# Patient Record
Sex: MALE | Race: BLACK OR AFRICAN AMERICAN | NOT HISPANIC OR LATINO | Employment: OTHER | URBAN - METROPOLITAN AREA
[De-identification: names, ages, dates, MRNs, and addresses within clinical notes are randomized per-mention and may not be internally consistent; named-entity substitution may affect disease eponyms.]

---

## 2017-02-02 ENCOUNTER — HOSPITAL ENCOUNTER (INPATIENT)
Facility: HOSPITAL | Age: 47
LOS: 19 days | Discharge: HOME | DRG: 219 | End: 2017-02-21
Attending: THORACIC SURGERY (CARDIOTHORACIC VASCULAR SURGERY) | Admitting: THORACIC SURGERY (CARDIOTHORACIC VASCULAR SURGERY)
Payer: MEDICAID

## 2017-02-02 DIAGNOSIS — I16.0 MALIGNANT HYPERTENSIVE URGENCY: ICD-10-CM

## 2017-02-02 DIAGNOSIS — E86.0 DEHYDRATION: ICD-10-CM

## 2017-02-02 DIAGNOSIS — I27.20 PULMONARY HTN: Primary | ICD-10-CM

## 2017-02-02 DIAGNOSIS — I95.81 POSTPROCEDURAL HYPOTENSION: ICD-10-CM

## 2017-02-02 DIAGNOSIS — I35.0 NONRHEUMATIC AORTIC VALVE STENOSIS: ICD-10-CM

## 2017-02-02 DIAGNOSIS — I10 UNCONTROLLED HYPERTENSION: ICD-10-CM

## 2017-02-02 DIAGNOSIS — I12.0 CKD STAGE 5 SECONDARY TO HYPERTENSION: ICD-10-CM

## 2017-02-02 DIAGNOSIS — I11.0 HYPERTENSIVE CARDIOMEGALY WITH HEART FAILURE: ICD-10-CM

## 2017-02-02 DIAGNOSIS — N18.6 ESRD (END STAGE RENAL DISEASE): ICD-10-CM

## 2017-02-02 DIAGNOSIS — I12.9 STAGE 3 CHRONIC KIDNEY DISEASE DUE TO ARTERIONEPHROSCLEROSIS: ICD-10-CM

## 2017-02-02 DIAGNOSIS — N18.5 CKD STAGE 5 SECONDARY TO HYPERTENSION: ICD-10-CM

## 2017-02-02 DIAGNOSIS — Q24.9 ADULT CONGENITAL HEART DISEASE: ICD-10-CM

## 2017-02-02 DIAGNOSIS — Z95.2 S/P AVR (AORTIC VALVE REPLACEMENT): ICD-10-CM

## 2017-02-02 DIAGNOSIS — N18.30 STAGE 3 CHRONIC KIDNEY DISEASE DUE TO ARTERIONEPHROSCLEROSIS: ICD-10-CM

## 2017-02-02 DIAGNOSIS — R73.9 HYPERGLYCEMIA: ICD-10-CM

## 2017-02-02 LAB
ALBUMIN SERPL BCP-MCNC: 2.8 G/DL
ALP SERPL-CCNC: 87 U/L
ALT SERPL W/O P-5'-P-CCNC: 41 U/L
ANION GAP SERPL CALC-SCNC: 5 MMOL/L
APTT BLDCRRT: 25.1 SEC
AST SERPL-CCNC: 26 U/L
BASOPHILS # BLD AUTO: 0.01 K/UL
BASOPHILS NFR BLD: 0.2 %
BILIRUB SERPL-MCNC: 0.5 MG/DL
BUN SERPL-MCNC: 54 MG/DL
CALCIUM SERPL-MCNC: 8.4 MG/DL
CHLORIDE SERPL-SCNC: 104 MMOL/L
CO2 SERPL-SCNC: 22 MMOL/L
CREAT SERPL-MCNC: 4.6 MG/DL
DIFFERENTIAL METHOD: ABNORMAL
EOSINOPHIL # BLD AUTO: 0.3 K/UL
EOSINOPHIL NFR BLD: 4.5 %
ERYTHROCYTE [DISTWIDTH] IN BLOOD BY AUTOMATED COUNT: 14.8 %
EST. GFR  (AFRICAN AMERICAN): 16.4 ML/MIN/1.73 M^2
EST. GFR  (NON AFRICAN AMERICAN): 14.2 ML/MIN/1.73 M^2
GLUCOSE SERPL-MCNC: 88 MG/DL
HCT VFR BLD AUTO: 26 %
HGB BLD-MCNC: 8.8 G/DL
INR PPP: 1.1
LYMPHOCYTES # BLD AUTO: 1.3 K/UL
LYMPHOCYTES NFR BLD: 20 %
MAGNESIUM SERPL-MCNC: 2.2 MG/DL
MCH RBC QN AUTO: 32.7 PG
MCHC RBC AUTO-ENTMCNC: 33.8 %
MCV RBC AUTO: 97 FL
MONOCYTES # BLD AUTO: 0.6 K/UL
MONOCYTES NFR BLD: 8.7 %
NEUTROPHILS # BLD AUTO: 4.4 K/UL
NEUTROPHILS NFR BLD: 66.3 %
PHOSPHATE SERPL-MCNC: 3.5 MG/DL
PLATELET # BLD AUTO: 194 K/UL
PMV BLD AUTO: 10.3 FL
POTASSIUM SERPL-SCNC: 4.4 MMOL/L
PROT SERPL-MCNC: 8.1 G/DL
PROTHROMBIN TIME: 11.3 SEC
RBC # BLD AUTO: 2.69 M/UL
SODIUM SERPL-SCNC: 131 MMOL/L
WBC # BLD AUTO: 6.64 K/UL

## 2017-02-02 PROCEDURE — 20600001 HC STEP DOWN PRIVATE ROOM

## 2017-02-02 PROCEDURE — 93005 ELECTROCARDIOGRAM TRACING: CPT

## 2017-02-02 PROCEDURE — 85025 COMPLETE CBC W/AUTO DIFF WBC: CPT

## 2017-02-02 PROCEDURE — 25000003 PHARM REV CODE 250: Performed by: SURGERY

## 2017-02-02 PROCEDURE — 84100 ASSAY OF PHOSPHORUS: CPT

## 2017-02-02 PROCEDURE — 80053 COMPREHEN METABOLIC PANEL: CPT

## 2017-02-02 PROCEDURE — 93010 ELECTROCARDIOGRAM REPORT: CPT | Mod: ,,, | Performed by: INTERNAL MEDICINE

## 2017-02-02 PROCEDURE — 85610 PROTHROMBIN TIME: CPT

## 2017-02-02 PROCEDURE — 83735 ASSAY OF MAGNESIUM: CPT

## 2017-02-02 PROCEDURE — 85730 THROMBOPLASTIN TIME PARTIAL: CPT

## 2017-02-02 RX ORDER — CARVEDILOL 6.25 MG/1
12.5 TABLET ORAL 2 TIMES DAILY
Status: DISCONTINUED | OUTPATIENT
Start: 2017-02-02 | End: 2017-02-03

## 2017-02-02 RX ADMIN — CARVEDILOL 12.5 MG: 6.25 TABLET, FILM COATED ORAL at 11:02

## 2017-02-02 NOTE — IP AVS SNAPSHOT
[facility]  [address]  [facility] [address]  Phone: [phone]           Patient Discharge Instructions     Our goal is to set you up for success. This packet includes information on your condition, medications, and your home care. It will help you to care for yourself so you don't get sicker and need to go back to the hospital.     Please ask your nurse if you have any questions.        There are many details to remember when preparing to leave the hospital. Here is what you will need to do:    1. Take your medicine. If you are prescribed medications, review your Medication List in the following pages. You may have new medications to  at the pharmacy and others that you'll need to stop taking. Review the instructions for how and when to take your medications. Talk with your doctor or nurses if you are unsure of what to do.     2. Go to your follow-up appointments. Specific follow-up information is listed in the following pages. Your may be contacted by a transition nurse or clinical provider about future appointments. Be sure we have all of the phone numbers to reach you, if needed. Please contact your provider's office if you are unable to make an appointment.     3. Watch for warning signs. Your doctor or nurse will give you detailed warning signs to watch for and when to call for assistance. These instructions may also include educational information about your condition. If you experience any of warning signs to your health, call your doctor.               ** Verify the list of medication(s) below is accurate and up to date. Carry this with you in case of emergency. If your medications have changed, please notify your healthcare provider.             Medication List      START taking these medications        Additional Info    Begin Date AM Noon PM Bedtime    ascorbic acid (vitamin C) 500 MG tablet   Commonly known as:  VITAMIN C   Refills:  0   Dose:  500 mg     Last time this was given:  500 mg on 2/21/2017  9:41 AM   Instructions:  Take 1 tablet (500 mg total) by mouth 2 (two) times daily.                                  aspirin 81 MG EC tablet   Commonly known as:  ECOTRIN   Refills:  0   Dose:  81 mg    Last time this was given:  81 mg on 2/21/2017  9:41 AM   Instructions:  Take 1 tablet (81 mg total) by mouth once daily.                               docusate sodium 100 MG capsule   Commonly known as:  COLACE   Refills:  0   Dose:  100 mg    Last time this was given:  100 mg on 2/21/2017  9:42 AM   Instructions:  Take 1 capsule (100 mg total) by mouth 2 (two) times daily.                                  omega-3 fatty acids-vitamin E 1,000 mg Cap   Commonly known as:  FISH OIL   Refills:  0   Dose:  1 capsule    Instructions:  Take 1 capsule by mouth once daily.                            oxycodone 5 MG immediate release tablet   Commonly known as:  ROXICODONE   Quantity:  60 tablet   Refills:  0   Dose:  5 mg    Last time this was given:  10 mg on 2/21/2017  6:38 AM   Instructions:  Take 1 tablet (5 mg total) by mouth every 4 (four) hours as needed.                            sevelamer carbonate 800 mg Tab   Commonly known as:  RENVELA   Quantity:  90 tablet   Refills:  11   Dose:  800 mg    Last time this was given:  800 mg on 2/21/2017  2:28 PM   Instructions:  Take 1 tablet (800 mg total) by mouth 3 (three) times daily with meals.                                     warfarin 7.5 MG tablet   Commonly known as:  COUMADIN   Quantity:  30 tablet   Refills:  11   Dose:  7.5 mg    Last time this was given:  7.5 mg on 2/20/2017  7:25 PM   Instructions:  Take 1 tablet (7.5 mg total) by mouth Daily.                                 CHANGE how you take these medications        Additional Info    Begin Date AM Noon PM Bedtime    cloNIDine 0.1 MG tablet   Commonly known as:  CATAPRES   Quantity:  60 tablet   Refills:  11   Dose:  0.1 mg   What changed:  when to take this     Last time this was given:  0.1 mg on 2/21/2017  9:42 AM   Instructions:  Take 1 tablet (0.1 mg total) by mouth 2 (two) times daily.                                    CONTINUE taking these medications        Additional Info    Begin Date AM Noon PM Bedtime    hydrALAZINE 100 MG tablet   Commonly known as:  APRESOLINE   Quantity:  90 tablet   Refills:  1    Last time this was given:  100 mg on 2/20/2017  6:14 AM   Instructions:  TAKE ONE TABLET BY MOUTH THREE TIMES DAILY FOR  HYPERTENSION                                     labetalol 300 MG tablet   Commonly known as:  NORMODYNE   Quantity:  60 tablet   Refills:  1   Dose:  300 mg    Last time this was given:  300 mg on 2/21/2017  2:28 PM   Instructions:  Take 1 tablet (300 mg total) by mouth every 12 (twelve) hours.                                    STOP taking these medications     amlodipine 10 MG tablet   Commonly known as:  NORVASC            Where to Get Your Medications      These medications were sent to [facility] - [address] [facility]  [address] [facility] [place] [address]     Phone:  [phone]     cloNIDine 0.1 MG tablet    sevelamer carbonate 800 mg Tab    warfarin 7.5 MG tablet         You can get these medications from any pharmacy     Bring a paper prescription for each of these medications     oxycodone 5 MG immediate release tablet       You don't need a prescription for these medications     ascorbic acid (vitamin C) 500 MG tablet    aspirin 81 MG EC tablet    docusate sodium 100 MG capsule    omega-3 fatty acids-vitamin E 1,000 mg Cap                  Please bring to all follow up appointments:    1. A copy of your discharge instructions.  2. All medicines you are currently taking in their original bottles.  3. Identification and insurance card.    Please arrive 15 minutes ahead of scheduled appointment time.    Please call 24 hours in advance if you must reschedule your appointment and/or time.        Your Scheduled  "Appointments     Mar 09, 2017  1:30 PM CST   Diagnostic Xray with [facility] [id number] 485 LB LIMIT   [facility] ([address] )    [address] [facility] [address] [phone]            Mar 09, 2017  2:15 PM CST   EKG with EKG, APPT   [facility] E[facility] ([address] )    [address]  [place] [address] [phone]            Mar 09, 2017  2:30 PM CST   Post OP with MD [facility] - Cardiovascular Surg ([address] )    [address]  [place] [address] [phone]              Follow-up Information     Follow up with Jose Gastelum MD.    Specialties:  Cardiothoracic Surgery, Transplant    Contact information:    [address] [facility] [address] [phone]            Discharge Instructions       [phone]. Appointment on Thursday, February 23, 2017 at 9:30am. Please bring Drivers license/ID and medications.     [facility]: [phone] ([address] [place] [address]) - You will start on Wednesday February 22nd.  Your appointment time is 8:15am.  Your schedule is Monday, Wednesday, and Friday.    You can call Medicaid in March or April to ask them if you can apply for full coverage Medicaid ([phone]).      Primary Diagnosis     Your primary diagnosis was:  Status Post Aortic Valve Replacement      Admission Information     Date & Time Department CSN    2/2/2017  8:20 PM [facility] [id number]      Care Providers     Provider Role Specialty Primary office phone    Leonides Kline MD Consulting Physician  Nephrology [phone]    Jose Gastelum MD Surgeon  Cardiothoracic Surgery [phone]      Your Vitals Were     BP Pulse Temp Resp Height Weight    97/61 (BP Location: Right arm, Patient Position: Lying, BP Method: Automatic) 100 99 °F (37.2 °C) (Oral) 16 6' 2" (1.88 m) 75 kg (165 lb 5.5 oz)    SpO2 BMI       "       96% 21.23 kg/m2         Recent Lab Values     No lab values to display.      Pending Labs     Order Current Status    APTT In process    APTT In process    APTT In process    Basic metabolic panel In process    Basic metabolic panel In process    Basic metabolic panel In process    CBC auto differential In process    CBC auto differential In process    Fibrinogen In process    Hematocrit In process    Hematocrit In process    Hemoglobin In process    Magnesium In process    Phosphorus In process    Potassium In process    Prepare RBC 2 Units; s/p cardiac surgery In process    Protime-INR In process    Protime-INR In process    Renal function panel In process    Prepare Fresh Frozen Plasma 6 Units Preliminary result    Prepare Platelets 1 Dose Preliminary result    Prepare RBC 2 Units; a Preliminary result    Prepare RBC 6 Units; surgery Preliminary result      Allergies as of 2/21/2017     No Known Allergies      Advance Directives     An advance directive is a document which, in the event you are no longer able to make decisions for yourself, tells your healthcare team what kind of treatment you do or do not want to receive, or who you would like to make those decisions for you.  If you do not currently have an advance directive, [facility] encourages you to create one.  For more information call:  [phone]), [phone] ([phone]),  or log on to [email or web address].        Language Assistance Services     ATTENTION: Language assistance services are available, free of charge. Please call [phone].      ATENCIÓN: Si habla español, tiene a sommer disposición servicios gratuitos de asistencia lingüística. Llame al [phone].     CHÚ Ý: N?u b?n nói Ti?ng Vi?t, có các d?ch v? h? tr? ngôn ng? mi?n phí dành cho b?n. G?i s? [phone].        Blood Transfusion Reaction Signs and Symptoms     The blood you have received has been matched for you as carefully as possible. Most  patients who receive a blood transfusion do not experience problems. However, there can be a delayed reaction that happens a few weeks after your blood transfusion. Contact your physician immediately if you experience any NEW SYMPTOMS listed below:     Fever greater than 100.4 degrees    Chills   Yellow color to your skin or eyes(Jaundice)   Back pain, chest pain, or pain at the infusion site   Weakness (more than usual)   Discomfort or uneasiness more than usual (Malaise)   Nausea or vomiting   Shortness of breath, wheezing, or coughing   Higher or lower blood pressure than normal   Skin rash, itching, skin redness, or localized swelling (example: hands or feet)   Urinating less than normal   Urine appears reddish or orange and is darker than normal      Remember that some these signs may already exist for you--such as having chronic back pain or high blood pressure. You only need to look for and report to your doctor any new occurrences since your blood transfusion that are of concern.        Heart Failure Education       Heart Failure: Being Active  You have a condition called heart failure. Being active doesnt mean that you have to wear yourself out. Even a little movement each day helps to strengthen your heart. If you cant get out to exercise, you can do simple stretching and strengthening exercises at home. These are good ways to keep you well-conditioned and prevent you and your heart from becoming excessively weak.    Ideas to get you started  · Add a little movement to things you do now. Walk to mail letters. Park your car at the far end of the parking lot and walk to the store. Walk up a flight of stairs instead of taking the elevator.  · Choose activities you enjoy. You might walk, swim, or ride an exercise bike. Things like gardening and washing the car count, too. Other possibilities include: washing dishes, walking the dog, walking around the mall, and doing aerobic activities with  friends.  · Join a group exercise program at a [facility] or [facility], a senior center, or a community center. Or look into a hospital cardiac rehabilitation program. Ask your doctor if you qualify.  Tips to keep you going  · Get up and get dressed each day. Go to a coffee shop and read a newspaper or go somewhere that you'll be in the presence of other active people. Youll feel more like being active.  · Make a plan. Choose one or more activities that you enjoy and that you can easily do. Then plan to do at least one each day. You might write your plan on a calendar.  · Go with a friend or a group if you like company. This can help you feel supported and stay motivated, too.  · Plan social events that you enjoy. This will keep you mentally engaged as well as physically motivated to do things you find pleasure in.  For your safety  · Talk with your healthcare provider before starting an exercise program.  · Exercise indoors when its too hot or too cold outside, or when the air quality is poor. Try walking at a shopping mall.  · Wear socks and sturdy shoes to maintain your balance and prevent falls.  · Start slowly. Do a few minutes several times a day at first. Increase your time and speed little by little.  · Stop and rest whenever you feel tired or get short of breath.  · Dont push yourself on days when you dont feel well.  Date Last Reviewed: 3/20/2016  © 2453-5876 [employer]. [address] [place] [address], [place] [address]. All rights reserved. This information is not intended as a substitute for professional medical care. Always follow your healthcare professional's instructions.              Heart Failure: Evaluating Your Heart  You have a condition called heart failure. To evaluate your condition, your doctor will examine you, ask questions, and do some tests. Along with looking for signs of heart failure, the doctor looks for any other health problems that may have led to heart failure. The results of  your evaluation will help your doctor form a treatment plan.  Health history and physical exam  Your visit will start with a health history. Tell the doctor about any symptoms youve noticed and about all medicines you take. Then youll have a physical exam. This includes listening to your heartbeat and breathing. Youll also be checked for swelling (edema) in your legs and neck. When you have fluid buildup or fluid in the lungs, it may be called congestive heart failure.  Diagnosing heart failure     During an echocardiogram, sound waves bounce off the heart. These are converted into a picture on the screen.   The following may be done to help your doctor form a diagnosis:  · X-rays show the size and shape of your heart. These pictures can also show fluid in your lungs.  · An electrocardiogram (ECG or EKG) shows the pattern of your heartbeat. Small pads (electrodes) are placed on your chest, arms, and legs. Wires connect the pads to the ECG machine, which records your hearts electrical signals. This can give the doctor information about heart function.  · An echocardiogram uses ultrasound waves to show the structure and movement of your heart muscle. This shows how well the heart pumps. It also shows the thickness of the heart walls, and if the heart is enlarged. It is one of the most useful, non-invasive tests as it provides information about the heart's general function. This helps your doctor make treatment decisions.  · Lab tests evaluate small amounts of blood or urine for signs of problems. A BNP lab test can help diagnose and evaluate heart failure. BNP stands for B-type natriuretic peptide. The ventricles secrete more BNP when heart failure worsens. Lab tests can also provide information about metabolic dysfunction or heart dysfunction.  Your treatment plan  Based on the results of your evaluation and tests, your doctor will develop a treatment plan. This plan is designed to relieve some of your heart  failure symptoms and help make you more comfortable. Your treatment plan may include:  · Medicine to help your heart work better and improve your quality of life  · Changes in what you eat and drink to help prevent fluid from backing up in your body  · Daily monitoring of your weight and heart failure symptoms to see how well your treatment plan is working  · Exercise to help you stay healthy  · Help with quitting smoking  · Emotional and psychological support to help adjust to the changes  · Referrals to other specialists to make sure you are being treated comprehensively  Date Last Reviewed: 3/21/2016  © 5088-7490 [employer]. [address] [place] [address]. All rights reserved. This information is not intended as a substitute for professional medical care. Always follow your healthcare professional's instructions.              Heart Failure: Making Changes to Your Diet  You have a condition called heart failure. When you have heart failure, excess fluid is more likely to build up in your body because your heart isn't working well. This makes the heart work harder to pump blood. Fluid buildup causes symptoms such as shortness of breath and swelling (edema). This is often referred to as congestive heart failure or CHF. Controlling the amount of salt (sodium) you eat may help stop fluid from building up. Your doctor may also tell you to reduce the amount of fluid you drink.  Reading food labels    Your healthcare provider will tell you how much sodium you can eat each day. Read food labels to keep track. Keep in mind that certain foods are high in salt. These include canned, frozen, and processed foods. Check the amount of sodium in each serving. Watch out for high-sodium ingredients. These include MSG (monosodium glutamate), baking soda, and sodium phosphate.   Eating less salt  Give yourself time to get used to eating less salt. It may take a little while. Here are some tips to  help:  · Take the saltshaker off the table. Replace it with salt-free herb mixes and spices.  · Eat fresh or plain frozen vegetables. These have much less salt than canned vegetables.  · Choose low-sodium snacks like sodium-free pretzels, crackers, or air-popped popcorn.  · Dont add salt to your food when youre cooking. Instead, season your foods with pepper, lemon, garlic, or onion.  · When you eat out, ask that your food be cooked without added salt.  · Avoid eating fried foods as these often have a great deal of salt.  If youre told to limit fluids  You may need to limit how much fluid you have to help prevent swelling. This includes anything that is liquid at room temperature, such as ice cream and soup. If your doctor tells you to limit fluid, try these tips:  · Measure drinks in a measuring cup before you drink them. This will help you meet daily goals.  · Chill drinks to make them more refreshing.  · Suck on frozen lemon wedges to quench thirst.  · Only drink when youre thirsty.  · Chew sugarless gum or suck on hard candy to keep your mouth moist.  · Weigh yourself daily to know if your body's fluid content is rising.  My sodium goal  Your healthcare provider may give you a sodium goal to meet each day. This includes sodium found in food as well as salt that you add. My goal is to eat no more than ___________ mg of sodium per day.     When to call your doctor  Call your doctor right away if you have any symptoms of worsening heart failure. These can include:  · Sudden weight gain  · Increased swelling of your legs or ankles  · Trouble breathing when youre resting or at night  · Increase in the number of pillows you have to sleep on  · Chest pain, pressure, discomfort, or pain in the jaw, neck, or back   Date Last Reviewed: 3/21/2016  © 6119-4123 [employer]. [address] [place] [address], [place] [address]. All rights reserved. This information is not intended as a substitute for professional  medical care. Always follow your healthcare professional's instructions.              Heart Failure: Medicines to Help Your Heart    You have a condition called heart failure (also known as congestive heart failure, or CHF). Your doctor will likely prescribe medicines for heart failure and any underlying health problems you have. Most heart failure patients take one or more types of medicinen. Your healthcare provider will work to find the combination of medicines that works best for you.  Heart failure medicines  Here are the most common heart failure medicines:  · ACE inhibitors lower blood pressure and decrease strain on the heart. This makes it easier for the heart to pump. Angiotensin receptor blockers have similar effects. These are prescribed for some patients instead of ACE inhibitors.  · Beta-blockers relieve stress on the heart. They also improve symptoms. They may also improve the heart's pumping action over time.  · Diuretics (also called water pills) help rid your body of excess water. This can help rid your body of swelling (edema). Having less fluid to pump means your heart doesnt have to work as hard. Some diuretics make your body lose a mineral called potassium. Your doctor will tell you if you need to take supplements or eat more foods high in potassium.  · Digoxin helps your heart pump with more strength. This helps your heart pump more blood with each beat. So, more oxygen-rich blood travels to the rest of the body.  · Aldosterone antagonists help alter hormones and decrease strain on the heart.  · Hydralazine and nitrates are two separate medicines used together to treat heart failure. They may come in one combination pill. They lower blood pressure and decrease how hard the heart has to pump.  Medicines for related conditions  Controlling other heart problems helps keep heart failure under control, too. Depending on other heart problems you have, medicines may be prescribed to:  · Lower  blood pressure (antihypertensives).  · Lower cholesterol levels (statins).  · Prevent blood clots (anticoagulants or aspirin).  · Keep the heartbeat steady (antiarrhythmics).  Date Last Reviewed: 3/5/2016  © 0737-6032 [employer]. [address] [place] [address]. All rights reserved. This information is not intended as a substitute for professional medical care. Always follow your healthcare professional's instructions.              Heart Failure: Procedures That May Help    The heart is a muscle that pumps oxygen-rich blood to all parts of the body. When you have heart failure, the heart is not able to pump as well as it should. Blood and fluid may back up into the lungs (congestive heart failure), and some parts of the body dont get enough oxygen-rich blood to work normally. These problems lead to the symptoms of heart failure.     Certain procedures may help the heart pump better in some cases of heart failure. Some procedures are done to treat health problems that may have caused the heart failure such as coronary artery disease or heart rhythm problems. For more serious heart failure, other options are available.  Treating artery and valve problems  If you have coronary artery disease or valve disease, procedures may be done to improve blood flow. This helps the heart pump better, which can improve heart failure symptoms. First, your doctor may do a cardiac catheterization to help detect clogged blood vessels or valve damage. During this procedure, a  thin tube (catheter) in inserted into a blood vessel and guided to the heart. There a dye is injected and a special type of X-ray (angiogram) is taken of the blood vessels. Procedures to open a blocked artery or fix damaged valves can also be done using catheterization.  · Angioplasty uses a balloon-tipped instrument at the end of the catheter. The balloon is inflated to widen the narrowed artery. In many cases, a stent is expanded to  further support the narrowed artery. A stent is a metal mesh tube.  · Valve surgery repairs or replacement of faulty valves can also be done during catheterization so blood can flow properly through the chambers of the heart.  Bypass surgery is another option to help treat blocked arteries. It uses a healthy blood vessel from elsewhere in the body. The healthy blood vessel is attached above and below the blocked area so that blood can flow around the blocked artery.  Treating heart rhythm problems  A device may be placed in the chest to help a weak heart maintain a healthy, heartbeat so the heart can pump more effectively:  · Pacemaker. A pacemaker is an implanted device that regulates your heartbeat electronically. It monitors your heart's rhythm and generates a painless electric impulse that helps the heart beat in a regular rhythm. A pacemaker is programmed to meet your specific heart rhythm needs.  · Biventricular pacing/cardiac resynchronization therapy. A type of pacemaker that paces both pumping chambers of the heart at the same time to coordinate contractions and to improve the heart's function. Some people with heart failure are candidates for this therapy.  · Implantable cardioverter defibrillator. A device similar to a pacemaker that senses when the heart is beating too fast and delivers an electrical shock to convert the fast rhythm to a normal rhythm. This can be a life saving device.  In severe cases  In more serious cases of heart failure when other treatments no longer work, other options may include:  · Ventricular assist devices (VADs). These are mechanical devices used to take over the pumping function for one or both of the heart's ventricles, or pumping chambers. A VAD may be necessary when heart failure progresses to the point that medicines and other treatments no longer help. In some cases, a VAD may be used as a bridge to transplant.  · Heart transplant. This is replacing the diseased heart  with a healthy one from a donor. This is an option for a few people who are very sick. A heart transplant is very serious and not an option for all patients. Your doctor can tell you more.  Date Last Reviewed: 3/20/2016  © 3877-4719 [employer]. [address] [place] [address]. All rights reserved. This information is not intended as a substitute for professional medical care. Always follow your healthcare professional's instructions.              Heart Failure: Tracking Your Weight  You have a condition called heart failure. When you have heart failure, a sudden weight gain or a steady rise in weight is a warning sign that your body is retaining too much water and salt. This could mean your heart failure is getting worse. If left untreated, it can cause problems for your lungs and result in shortness of breath. Weighing yourself each day is the best way to know if youre retaining water. If your weight goes up quickly, call your doctor. You will be given instructions on how to get rid of the excess water. You will likely need medicines and to avoid salt. This will help your heart work better.  Call your doctor if you gain more than 2 pounds in 1 day, more than 5 pounds in 1 week, or whatever weight gain you were told to report by your doctor. This is often a sign of worsening heart failure and needs to be evaluated and treated. Your doctor will tell you what to do next.   Tips for weighing yourself    · Weigh yourself at the same time each morning, wearing the same clothes. Weigh yourself after urinating and before eating.  · Use the same scale each day. Make sure the numbers are easy to read. Put the scale on a flat, hard surface -- not on a rug or carpet.  · Do not stop weighing yourself. If you forget one day, weigh again the next morning.  How to use your weight chart  · Keep your weight chart near the scale. Write your weight on the chart as soon as you get off the scale.  · Fill in the  month and the start date on the chart. Then write down your weight each day. Your chart will look like this:    · If you miss a day, leave the space blank. Weigh yourself the next day and write your weight in the next space.  · Take your weight chart with you when you go to see your doctor.  Date Last Reviewed: 3/20/2016  © 1253-8911 [employer]. [address] [place] [address], [place] [address]. All rights reserved. This information is not intended as a substitute for professional medical care. Always follow your healthcare professional's instructions.              Heart Failure: Warning Signs of a Flare-Up  You have a condition called heart failure. Once you have heart failure, flare-ups can happen. Below are signs that can mean your heart failure is getting worse. If you notice any of these warning signs, call your healthcare provider.  Swelling    · Your feet, ankles, or lower legs get puffier.  · You notice skin changes on your lower legs.  · Your shoes feel too tight.  · Your clothes are tighter in the waist.  · You have trouble getting rings on or off your fingers.  Shortness of breath  · You have to breathe harder even when youre doing your normal activities or when youre resting.  · You are short of breath walking up stairs or even short distances.  · You wake up at night short of breath or coughing.  · You need to use more pillows or sit up to sleep.  · You wake up tired or restless.  Other warning signs  · You feel weaker, dizzy, or more tired.  · You have chest pain or changes in your heartbeat.  · You have a cough that wont go away.  · You cant remember things or dont feel like eating.  Tracking your weight  Gaining weight is often the first warning sign that heart failure is getting worse. Gaining even a few pounds can be a sign that your body is retaining excess water and salt. Weighing yourself each day in the morning after you urinate and before you eat, is the best way to know if you're  retaining water. Get a scale that is easy to read and make sure you wear the same clothes and use the same scale every time you weigh. Your healthcare provider will show you how to track your weight. Call your doctor if you gain more than 2 pounds in 1 day, 5 pounds in 1 week, or whatever weight gain you were told to report by your doctor. This is often a sign of worsening heart failure and needs to be evaluated and treated before it compromises your breathing. Your doctor will tell you what to do next.    Date Last Reviewed: 3/15/2016  © 1711-0289 [employer]. [address] [place] [address], [place] [address]. All rights reserved. This information is not intended as a substitute for professional medical care. Always follow your healthcare professional's instructions.              Coumadin Discharge Instructions                         Chronic [facility] Disease Education             [facility] Sign-Up     Activating your [facility] account is as easy as 1-2-3!     1) Visit [email or web address], select Sign Up Now, enter this activation code and your date of birth, then select Next.  [email or web address]  Expires: 4/3/2017  9:46 AM      2) Create a username and password to use when you visit [facility] in the future and select a security question in case you lose your password and select Next.    3) Enter your e-mail address and click Sign Up!    Additional Information  If you have questions, please e-mail [email or web address] or call [phone] to talk to our [facility] staff. Remember, [facility] is NOT to be used for urgent needs. For medical emergencies, dial 911.          [facility] complies with applicable Federal civil rights laws and does not discriminate on the basis of race, color, national origin, age, disability, or sex.

## 2017-02-03 ENCOUNTER — APPOINTMENT (OUTPATIENT)
Dept: CARDIOLOGY | Facility: CLINIC | Age: 47
End: 2017-02-03
Payer: MEDICAID

## 2017-02-03 DIAGNOSIS — I35.0 SEVERE AORTIC STENOSIS: Primary | ICD-10-CM

## 2017-02-03 LAB
ANION GAP SERPL CALC-SCNC: 4 MMOL/L
BASOPHILS # BLD AUTO: 0.01 K/UL
BASOPHILS NFR BLD: 0.2 %
BUN SERPL-MCNC: 58 MG/DL
CALCIUM SERPL-MCNC: 8.2 MG/DL
CHLORIDE SERPL-SCNC: 105 MMOL/L
CO2 SERPL-SCNC: 24 MMOL/L
CREAT SERPL-MCNC: 4.8 MG/DL
DIFFERENTIAL METHOD: ABNORMAL
EOSINOPHIL # BLD AUTO: 0.3 K/UL
EOSINOPHIL NFR BLD: 4.7 %
ERYTHROCYTE [DISTWIDTH] IN BLOOD BY AUTOMATED COUNT: 14.8 %
EST. GFR  (AFRICAN AMERICAN): 15.6 ML/MIN/1.73 M^2
EST. GFR  (NON AFRICAN AMERICAN): 13.5 ML/MIN/1.73 M^2
GLUCOSE SERPL-MCNC: 80 MG/DL
HCT VFR BLD AUTO: 25.3 %
HGB BLD-MCNC: 8.5 G/DL
INTERNAL CAROTID STENOSIS: NORMAL
LYMPHOCYTES # BLD AUTO: 0.8 K/UL
LYMPHOCYTES NFR BLD: 13.6 %
MAGNESIUM SERPL-MCNC: 2.1 MG/DL
MCH RBC QN AUTO: 32.3 PG
MCHC RBC AUTO-ENTMCNC: 33.6 %
MCV RBC AUTO: 96 FL
MONOCYTES # BLD AUTO: 1.2 K/UL
MONOCYTES NFR BLD: 20.3 %
NEUTROPHILS # BLD AUTO: 3.7 K/UL
NEUTROPHILS NFR BLD: 61 %
PHOSPHATE SERPL-MCNC: 4 MG/DL
PLATELET # BLD AUTO: 189 K/UL
PMV BLD AUTO: 10 FL
POTASSIUM SERPL-SCNC: 4.3 MMOL/L
RBC # BLD AUTO: 2.63 M/UL
SODIUM SERPL-SCNC: 133 MMOL/L
WBC # BLD AUTO: 5.97 K/UL

## 2017-02-03 PROCEDURE — 93880 EXTRACRANIAL BILAT STUDY: CPT | Mod: 26,,, | Performed by: INTERNAL MEDICINE

## 2017-02-03 PROCEDURE — 25000003 PHARM REV CODE 250: Performed by: INTERNAL MEDICINE

## 2017-02-03 PROCEDURE — 80048 BASIC METABOLIC PNL TOTAL CA: CPT

## 2017-02-03 PROCEDURE — 85025 COMPLETE CBC W/AUTO DIFF WBC: CPT

## 2017-02-03 PROCEDURE — 80100016 HC MAINTENANCE HEMODIALYSIS

## 2017-02-03 PROCEDURE — 84100 ASSAY OF PHOSPHORUS: CPT

## 2017-02-03 PROCEDURE — 93880 EXTRACRANIAL BILAT STUDY: CPT

## 2017-02-03 PROCEDURE — 25000003 PHARM REV CODE 250: Performed by: NURSE PRACTITIONER

## 2017-02-03 PROCEDURE — 83735 ASSAY OF MAGNESIUM: CPT

## 2017-02-03 PROCEDURE — 20600001 HC STEP DOWN PRIVATE ROOM

## 2017-02-03 PROCEDURE — 90935 HEMODIALYSIS ONE EVALUATION: CPT | Mod: ,,, | Performed by: INTERNAL MEDICINE

## 2017-02-03 RX ORDER — CLONIDINE HYDROCHLORIDE 0.1 MG/1
0.1 TABLET ORAL NIGHTLY
Status: DISCONTINUED | OUTPATIENT
Start: 2017-02-03 | End: 2017-02-08

## 2017-02-03 RX ORDER — HEPARIN SODIUM 1000 [USP'U]/ML
1000 INJECTION, SOLUTION INTRAVENOUS; SUBCUTANEOUS
Status: DISCONTINUED | OUTPATIENT
Start: 2017-02-03 | End: 2017-02-21

## 2017-02-03 RX ORDER — CARVEDILOL 12.5 MG/1
25 TABLET ORAL 2 TIMES DAILY
Status: DISCONTINUED | OUTPATIENT
Start: 2017-02-03 | End: 2017-02-08

## 2017-02-03 RX ORDER — HYDRALAZINE HYDROCHLORIDE 50 MG/1
100 TABLET, FILM COATED ORAL EVERY 8 HOURS
Status: DISCONTINUED | OUTPATIENT
Start: 2017-02-03 | End: 2017-02-08

## 2017-02-03 RX ORDER — ASPIRIN 81 MG/1
81 TABLET ORAL DAILY
Status: DISCONTINUED | OUTPATIENT
Start: 2017-02-03 | End: 2017-02-08

## 2017-02-03 RX ORDER — AMLODIPINE BESYLATE 10 MG/1
10 TABLET ORAL DAILY
Status: DISCONTINUED | OUTPATIENT
Start: 2017-02-03 | End: 2017-02-08

## 2017-02-03 RX ORDER — ACETAMINOPHEN 325 MG/1
650 TABLET ORAL EVERY 6 HOURS PRN
Status: DISCONTINUED | OUTPATIENT
Start: 2017-02-03 | End: 2017-02-08

## 2017-02-03 RX ORDER — BISACODYL 5 MG
5 TABLET, DELAYED RELEASE (ENTERIC COATED) ORAL DAILY PRN
Status: DISCONTINUED | OUTPATIENT
Start: 2017-02-03 | End: 2017-02-08

## 2017-02-03 RX ORDER — SODIUM CHLORIDE 9 MG/ML
INJECTION, SOLUTION INTRAVENOUS
Status: DISCONTINUED | OUTPATIENT
Start: 2017-02-03 | End: 2017-02-05

## 2017-02-03 RX ORDER — SODIUM CHLORIDE 9 MG/ML
INJECTION, SOLUTION INTRAVENOUS ONCE
Status: COMPLETED | OUTPATIENT
Start: 2017-02-03 | End: 2017-02-03

## 2017-02-03 RX ADMIN — SODIUM CHLORIDE 200 ML: 0.9 INJECTION, SOLUTION INTRAVENOUS at 06:02

## 2017-02-03 RX ADMIN — CARVEDILOL 25 MG: 25 TABLET, FILM COATED ORAL at 08:02

## 2017-02-03 RX ADMIN — HYDRALAZINE HYDROCHLORIDE 100 MG: 50 TABLET ORAL at 01:02

## 2017-02-03 RX ADMIN — HYDRALAZINE HYDROCHLORIDE 100 MG: 50 TABLET ORAL at 08:02

## 2017-02-03 RX ADMIN — CLONIDINE HYDROCHLORIDE 0.1 MG: 0.1 TABLET ORAL at 08:02

## 2017-02-03 RX ADMIN — HEPARIN SODIUM 1000 UNITS: 1000 INJECTION, SOLUTION INTRAVENOUS; SUBCUTANEOUS at 06:02

## 2017-02-03 RX ADMIN — CARVEDILOL 25 MG: 25 TABLET, FILM COATED ORAL at 09:02

## 2017-02-03 RX ADMIN — ASPIRIN 81 MG: 81 TABLET, COATED ORAL at 12:02

## 2017-02-03 RX ADMIN — AMLODIPINE BESYLATE 10 MG: 10 TABLET ORAL at 09:02

## 2017-02-03 NOTE — CONSULTS
Consult Note  Nephrology    Consult Requested By: Jose Gastelum MD     Reason for Consult: TERI on CKD 5. Baseline creatinine >5.     SUBJECTIVE:     History of Present Illness:    47 y.o. AAM with h/o ASD repair at 7 yo, aortic stenosis with CHF wirh rEF, pulmonary HTN, active hep B transferred from [facility] for surgery eval for AVR. Recently admitted to OSH for SVT, HTN, CHF exacerbation 1/23 and discharged on coreg, clonidine, verapamil, readmitted to OSH 1/25 with chest pain and found to be in heart block which was thought to be medication induced. Pt came out of SVT, out of ICU. He had worsening of his CKD and had a tunneled RIJ line placed for HD on 1/26. Started HD on Friday 1/27. He has had a total of 3 dialysis sessions and gets HD MWF. His creatinine was 3.1 in 2014 and per transfer notes baseline creatinine is >5 consistent with CKD 5 now ESRD.  He got a C on 1/31 with contrast         Past Medical History   Diagnosis Date    CKD (chronic kidney disease) stage 3, GFR 30-59 ml/min     Hypertension     Tachycardia      Past Surgical History   Procedure Laterality Date    Cardiac surgery       History reviewed. No pertinent family history.  Social History   Substance Use Topics    Smoking status: Never Smoker    Smokeless tobacco: None    Alcohol use Yes      Comment: weekends only        Review of patient's allergies indicates:  No Known Allergies     Review of Systems:  Constitutional: no fever or chills  Respiratory: +sob  Cardiovascular: no chest pain or palpitations  Gastrointestinal: no nausea or vomiting, no abdominal pain or change in bowel habits  Musculoskeletal: no arthralgias or myalgias  Neurological: no seizures or tremors    OBJECTIVE:     Vital Signs (Most Recent)  Temp: 98.4 °F (36.9 °C) (02/03/17 1130)  Pulse: 81 (02/03/17 1300)  Resp: 16 (02/03/17 1130)  BP: 126/67 (02/03/17 1130)  SpO2: 99 % (02/03/17 1130)    Vital Signs Range (Last 24H):  Temp:  [98.2 °F (36.8  °C)-98.8 °F (37.1 °C)]   Pulse:  [77-97]   Resp:  [16-20]   BP: (126-184)/()   SpO2:  [97 %-99 %]     Physical Exam:  General: well developed, well nourished, chronic illness+  Lungs:  clear to auscultation bilaterally and normal respiratory effort  Cardiovascular: Heart: regular rate and rhythm and +murmur. Chest Wall: no tenderness. Extremities: no cyanosis or edema, or clubbing. Pulses: 2+ and symmetric.  Abdomen/Rectal: Abdomen: soft, non-tender non-distented; bowel sounds normal; no masses,  no organomegaly. Rectal: not examined  Musculoskeletal:no clubbing, cyanosis and has right IJ perm cath+  Neurologic: Mental status: Alert, oriented, thought content appropriate    Laboratory:  CBC:   Recent Labs  Lab 02/03/17  0433   WBC 5.97   RBC 2.63*   HGB 8.5*   HCT 25.3*      MCV 96   MCH 32.3*   MCHC 33.6     CMP:   Recent Labs  Lab 02/02/17  2101 02/03/17  0433   GLU 88 80   CALCIUM 8.4* 8.2*   ALBUMIN 2.8*  --    PROT 8.1  --    * 133*   K 4.4 4.3   CO2 22* 24    105   BUN 54* 58*   CREATININE 4.6* 4.8*   ALKPHOS 87  --    ALT 41  --    AST 26  --    BILITOT 0.5  --      Diagnostic Results:  Labs: Reviewed  X-Ray: Reviewed    ASSESSMENT/PLAN:     47 y.o. AAM with h/o ASD repair at 9 yo, aortic stenosis with CHF wirh rEF, pulmonary HTN, active hep B transferred from [facility] for surgery eval for AVR. Recently admitted to OSH for SVT, HTN, CHF exacerbation 1/23 and discharged on coreg, clonidine, verapamil, readmitted to OSH 1/25 with chest pain and found to be in heart block which was thought to be medication induced. Pt came out of SVT, out of ICU. He had worsening of his CKD and had a tunneled RIJ line placed for HD on 1/26. Started HD on Friday 1/27. He has had a total of 3 dialysis sessions and gets HD MWF. His creatinine was 3.1 in 2014 and per transfer notes baseline creatinine is >5 consistent with CKD 5 now ESRD.  He got a [facility] on 1/31 with contrast       CKD 5--->ESRD on  IHD MWF (on HD x 1 week)  Residual renal function- yes     Will provide dialysis for metabolic clearance and volume management today.   Dialysate adjusted to current labs   Monitor urine output for recovery of renal fxn.     Access- right IJ perm cath        Anemia of Chronic Kidney Disease   HH 8.5/25   Goal in ESRD is Hgb of 10-11.   Will check iron studies in am.   Epo and IV iron to be given with HD if indicated.   Will review chronic care plan from outpatient dialysis center for dosing.       Mineral Bone Disease in CKD   Add renal restrictions to diet.   Renal panel on dialysis days so that phos and albumin is monitored.  Corrected calcium- WNL   Phos- 4.0  Phos binder- phos WNL. No need for binder at this time  Vitamin D and PTH to be checked with am labs.       Yamilet Felder MD  Nephrology Fellow  Pager [phone]   Cell [phone]     ATTENDING PHYSICIAN ATTESTATION  I have personally interviewed and examined the patient. I thoroughly reviewed the demographic, clinical, laboratorial and imaging information available in medical records. I agree with the assessment and recommendations provided by the subspecialty resident. Dr. Wood was under my supervision.     HEMODIALYSIS NOTE  Patient evaluated while undergoing hemodialysis indicated for new onset ESRD. Tolerating session with current UFR, no complications.

## 2017-02-03 NOTE — PLAN OF CARE
Problem: Patient Care Overview  Goal: Plan of Care Review  Outcome: Ongoing (interventions implemented as appropriate)  VSS. Denies any CP, SOB, palpitations, or dizziness. Fall precautions maintained. Free of fall/trauma/injury. General skin remains intact with no breakdown. Dressings to R.IJ and Dialysis catheter remain CDI. PT NPO after midnight. Plan is for CXR and consult with Nephrology today. Plan of care reviewed and updated with patient and sister, verbalizes understanding. Patient in no acute distress. Will continue to monitor.

## 2017-02-03 NOTE — H&P
[facility]  History & Physical  General Surgery    SUBJECTIVE:     Chief Complaint/Reason for Admission: AI/TR    History of Present Illness:  Patient is a 47 y.o. male with h/o ASD repair at 7 yo, AS with CHF wirh rEF, pulmonary HTN, active hep B transferred from [facility] for surgery eval. Recently admitted to OSH for SVT, HTN, CHF exacerbation 1/23 and discharged on coreg, clonidine, verapamil, readmitted to OSH 1/25 with chest pain and found to be in heart block which was thought to be medication induced. Pt came out of SVT, out of ICU. He had worsening of his CKD and had a tunneled RIJ line placed for HD. Pt reports still making urine. Denies any CP lately, denies SOA at rest. Says is not comfortable to lay flat. Says he can walk without CP or SOA and was independent at home prior to admission.     Pt had LHC and DAVID performed at OSH (no images).     PTA Medications   Medication Sig    amlodipine (NORVASC) 10 MG tablet Take 1 tablet (10 mg total) by mouth once daily.    cloNIDine (CATAPRES) 0.1 MG tablet Take 1 tablet (0.1 mg total) by mouth every evening.    hydrALAZINE (APRESOLINE) 100 MG tablet TAKE ONE TABLET BY MOUTH THREE TIMES DAILY FOR  HYPERTENSION    labetalol (NORMODYNE) 300 MG tablet Take 1 tablet (300 mg total) by mouth every 12 (twelve) hours.       Review of patient's allergies indicates:  No Known Allergies    Past Medical History   Diagnosis Date    CKD (chronic kidney disease) stage 3, GFR 30-59 ml/min     Hypertension     Tachycardia      Past Surgical History   Procedure Laterality Date    Cardiac surgery       No family history on file.  Social History   Substance Use Topics    Smoking status: Never Smoker    Smokeless tobacco: Not on file    Alcohol use Yes      Comment: weekends only         Review of Systems:  Constitutional: no fever or chills  Respiratory: positive for dyspnea on exertion  Cardiovascular: no chest pain or palpitations, positive  for orthopnea  Gastrointestinal: no nausea or vomiting, tolerating diet  Genitourinary: no hematuria or dysuria  Hematologic/Lymphatic: no easy bruising or lymphadenopathy  Neurological: negative for dizziness    OBJECTIVE:     Vital Signs (Most Recent):  Temp: 98.3 °F (36.8 °C) (02/02/17 2035)  Pulse: 90 (02/02/17 2100)  Resp: 18 (02/02/17 2035)  BP: (!) 184/103 (02/02/17 2035)  SpO2: 99 % (02/02/17 2035)    Physical Exam:  NAD  RRR, +murmur  Unlabored breathing, CTAB  Abd soft, mildly distened  No C/C/E  Alert and oriented    Laboratory:  CBC:   Recent Labs  Lab 02/02/17 2101   WBC 6.64   RBC 2.69*   HGB 8.8*   HCT 26.0*      MCV 97   MCH 32.7*   MCHC 33.8     BMP:   Recent Labs  Lab 02/02/17 2101   GLU 88   *   K 4.4      CO2 22*   BUN 54*   CREATININE 4.6*   CALCIUM 8.4*   MG 2.2      OSH Hep B IgM positive    Diagnostic Results:    RHC- reportedly pulmonary HTN, sever AI    DAVID 1/31:  PCWP 26  PAP 76/36  RV 70/12  RA 22/24  LA enlarged/RA enlarged, no ASD/VSD  LV dilation, LVEF 20%  Trileaflet AV with severe AI  Mild MR  TV dilation with severe TR      ASSESSMENT/PLAN:   48 yo M with h/o ASD repair, now with CHFrEF, AI, TR, ESRD on HD    -consult Nephrology re HD needs  -restart coreg  -acute hep B- IgM positive at OSH, LFTs have been normal  -STS for AVR based on available information (no coronary anatomy information) is 4%  -CXR in am  -NPO at midnight  -labs in am  -EKG  -tele  -strict intake/output    Isa Bowser MD  PGY-3 General Surgery  Pager: [phone]      CTS Attending Note:    I have personally taken the history and examined this patient and agree with the resident's note as stated above. Plan repeat ECHO, +/- MRI to eval for CHD.

## 2017-02-03 NOTE — PROGRESS NOTES
Report received from MENDEL Lai RN. Pt arrived from [facility]. Maintenance dialysis initiated via R chest permcath without difficulty.

## 2017-02-03 NOTE — PLAN OF CARE
Problem: Patient Care Overview  Goal: Plan of Care Review  Outcome: Ongoing (interventions implemented as appropriate)  Patient progressing toward all goals. Plan of care discussed with patient, no questions at this time. Patient ambulating independently, fall precautions in place. Meds changed per MD; New tests ordered; Family at bedside;  VS  numbers WNL. Will monitor.

## 2017-02-03 NOTE — PLAN OF CARE
LEI spoke with the NP about the pt.  Pt has no insurance and will likely have many dc needs.  LEI spoke with Lisseth at [facility].  SHe will contact medicaid to see if there is a pending application.  If there is not, she will do one.  LEI will f/u as needed.    Yue Garibay, [phone]

## 2017-02-03 NOTE — NURSING
Pt admitted to [facility] Rm. 322. Arrived to unit via stretcher with [facility]. Pt ambulated to bed from stretcher w/o any difficulties. Bed locked, side rails up x 2, call bell near by. Telemetry applied. Verbalized understanding to call for assistance. Pt oriented to room. Reviewed plan of care with pt and family. NADN. Pt sitting up in bed, voices no complaints. Will continue to monitor.

## 2017-02-04 PROBLEM — Q24.9 CONGENITAL HEART ANOMALY: Status: ACTIVE | Noted: 2017-02-04

## 2017-02-04 PROBLEM — I35.0 NONRHEUMATIC AORTIC VALVE STENOSIS: Status: ACTIVE | Noted: 2017-02-04

## 2017-02-04 LAB
25(OH)D3+25(OH)D2 SERPL-MCNC: 25 NG/ML
FERRITIN SERPL-MCNC: 448 NG/ML
IRON SERPL-MCNC: 17 UG/DL
PTH-INTACT SERPL-MCNC: 366 PG/ML
SATURATED IRON: 6 %
TOTAL IRON BINDING CAPACITY: 300 UG/DL
TRANSFERRIN SERPL-MCNC: 203 MG/DL

## 2017-02-04 PROCEDURE — 20600001 HC STEP DOWN PRIVATE ROOM

## 2017-02-04 PROCEDURE — 83970 ASSAY OF PARATHORMONE: CPT

## 2017-02-04 PROCEDURE — 82728 ASSAY OF FERRITIN: CPT

## 2017-02-04 PROCEDURE — 83540 ASSAY OF IRON: CPT

## 2017-02-04 PROCEDURE — 87517 HEPATITIS B DNA QUANT: CPT

## 2017-02-04 PROCEDURE — 82306 VITAMIN D 25 HYDROXY: CPT

## 2017-02-04 PROCEDURE — 25000003 PHARM REV CODE 250: Performed by: NURSE PRACTITIONER

## 2017-02-04 RX ADMIN — CARVEDILOL 25 MG: 25 TABLET, FILM COATED ORAL at 08:02

## 2017-02-04 RX ADMIN — AMLODIPINE BESYLATE 10 MG: 10 TABLET ORAL at 08:02

## 2017-02-04 RX ADMIN — ASPIRIN 81 MG: 81 TABLET, COATED ORAL at 08:02

## 2017-02-04 RX ADMIN — HYDRALAZINE HYDROCHLORIDE 100 MG: 50 TABLET ORAL at 01:02

## 2017-02-04 RX ADMIN — CLONIDINE HYDROCHLORIDE 0.1 MG: 0.1 TABLET ORAL at 08:02

## 2017-02-04 RX ADMIN — HYDRALAZINE HYDROCHLORIDE 100 MG: 50 TABLET ORAL at 09:02

## 2017-02-04 RX ADMIN — HYDRALAZINE HYDROCHLORIDE 100 MG: 50 TABLET ORAL at 05:02

## 2017-02-04 NOTE — PROGRESS NOTES
Progress Note  Cardiothoracic Surgery    Admit Date: 2/2/2017  Post-operative Day:    Hospital Day: 3    SUBJECTIVE:  No acute events overnight.  SR per monitor.     Follow-up For: Procedure(s) (LRB):  REPLACEMENT-VALVE-AORTIC (N/A)  REDO STERNOTOMY WITH AORTIC VALVE REPLACEMENT/redo sternotomy (N/A)    Scheduled Meds:   amlodipine  10 mg Oral Daily    aspirin  81 mg Oral Daily    carvedilol  25 mg Oral BID    cloNIDine  0.1 mg Oral QHS    hydrALAZINE  100 mg Oral Q8H     Infusions/Drips:   PRN Meds: sodium chloride 0.9%, acetaminophen, bisacodyl, heparin (porcine)    Review of patient's allergies indicates:  No Known Allergies    OBJECTIVE:     Vital Signs (Most Recent)  Temp: 98.9 °F (37.2 °C) (02/04/17 1200)  Pulse: 75 (02/04/17 1200)  Resp: 18 (02/04/17 1200)  BP: (!) 145/81 (02/04/17 1200)  SpO2: 95 % (02/04/17 1200)    Admission Weight: 86.3 kg (190 lb 4.1 oz) (02/02/17 2035)   Most Recent Weight: 86.7 kg (191 lb 2.2 oz) (02/03/17 0500)    Vital Signs Range (Last 24H):  Temp:  [97.9 °F (36.6 °C)-98.9 °F (37.2 °C)]   Pulse:  [71-88]   Resp:  [16-18]   BP: (139-186)/(70-99)   SpO2:  [95 %-100 %]     I & O (Last 24H):  Intake/Output Summary (Last 24 hours) at 02/04/17 1226  Last data filed at 02/04/17 0600   Gross per 24 hour   Intake             1231 ml   Output             1600 ml   Net             -369 ml     Physical Exam:  General: no distress  Lungs:  clear to auscultation bilaterally and normal respiratory effort  Heart: regular rate and rhythm, S1, S2 normal, no murmur, rub or gallop  Abdomen: soft/nontender   Extremities: warm, well perfused and no cyanosis or edema, or clubbing  Skin: Skin color, texture, turgor normal. No rashes or lesions      Laboratory:  CBC:   Recent Labs  Lab 02/03/17  0433   WBC 5.97   RBC 2.63*   HGB 8.5*   HCT 25.3*      MCV 96   MCH 32.3*   MCHC 33.6     BMP:   Recent Labs  Lab 02/03/17 0433   GLU 80   *   K 4.3      CO2 24   BUN 58*   CREATININE 4.8*    CALCIUM 8.2*   MG 2.1         ASSESSMENT/PLAN:     Assessment:   Active Hospital Problems    Diagnosis    Nonrheumatic aortic valve stenosis    Congenital heart anomaly    Pulmonary HTN    CKD stage 5 secondary to hypertension    Uncontrolled hypertension        Hepatitis B    Plan:   Continue w/u for potential surgery on Wed  Ask hepatology to see  Send Hep B labs per hepatology rec's  2D echo on Monday with Dr. Ceron to read  Follow BP and adjust medications accordingly  Nephrology following

## 2017-02-04 NOTE — PLAN OF CARE
Problem: Patient Care Overview  Goal: Plan of Care Review  Outcome: Ongoing (interventions implemented as appropriate)  Pt free of falls/trauma/injuries this shift. Patient verbalizes no chest pain, SOB, or dizziness, VSS. Plan of care reviewed with patient at bedside, all questions answered. Patient ambulates independently, fall precautions in place. Will continue to monitor.

## 2017-02-04 NOTE — PLAN OF CARE
Problem: Patient Care Overview  Goal: Plan of Care Review  Outcome: Ongoing (interventions implemented as appropriate)  Plan of care discussed with patient, no new questions at this time. VSS, denies SOB, no complaint of pain. Safety precautions taken, no falls/trauma during the shift. Will continue to monitor.

## 2017-02-04 NOTE — PLAN OF CARE
Problem: Patient Care Overview  Goal: Plan of Care Review  Outcome: Ongoing (interventions implemented as appropriate)  2hr HD treatment completed 0.5L of fluid removed pt tolerated well. Both lumens of R chest permcath instilled with 2.2cc of Heparin, capped and taped. Report given to MENDEL Lai RN.

## 2017-02-05 LAB
ALBUMIN SERPL BCP-MCNC: 2.4 G/DL
ALP SERPL-CCNC: 76 U/L
ALT SERPL W/O P-5'-P-CCNC: 41 U/L
ANION GAP SERPL CALC-SCNC: 7 MMOL/L
AST SERPL-CCNC: 29 U/L
BASOPHILS # BLD AUTO: 0.01 K/UL
BASOPHILS NFR BLD: 0.2 %
BILIRUB SERPL-MCNC: 0.5 MG/DL
BUN SERPL-MCNC: 52 MG/DL
CALCIUM SERPL-MCNC: 8.1 MG/DL
CHLORIDE SERPL-SCNC: 105 MMOL/L
CO2 SERPL-SCNC: 21 MMOL/L
CREAT SERPL-MCNC: 4.5 MG/DL
DIFFERENTIAL METHOD: ABNORMAL
EOSINOPHIL # BLD AUTO: 0.3 K/UL
EOSINOPHIL NFR BLD: 6.2 %
ERYTHROCYTE [DISTWIDTH] IN BLOOD BY AUTOMATED COUNT: 14.5 %
EST. GFR  (AFRICAN AMERICAN): 16.9 ML/MIN/1.73 M^2
EST. GFR  (NON AFRICAN AMERICAN): 14.6 ML/MIN/1.73 M^2
GLUCOSE SERPL-MCNC: 124 MG/DL
HCT VFR BLD AUTO: 24.8 %
HGB BLD-MCNC: 8.4 G/DL
LYMPHOCYTES # BLD AUTO: 0.9 K/UL
LYMPHOCYTES NFR BLD: 17.7 %
MCH RBC QN AUTO: 32.3 PG
MCHC RBC AUTO-ENTMCNC: 33.9 %
MCV RBC AUTO: 95 FL
MONOCYTES # BLD AUTO: 0.3 K/UL
MONOCYTES NFR BLD: 6.2 %
NEUTROPHILS # BLD AUTO: 3.7 K/UL
NEUTROPHILS NFR BLD: 69.7 %
PLATELET # BLD AUTO: 172 K/UL
PMV BLD AUTO: 9.9 FL
POTASSIUM SERPL-SCNC: 4.6 MMOL/L
PROT SERPL-MCNC: 7 G/DL
RBC # BLD AUTO: 2.6 M/UL
SODIUM SERPL-SCNC: 133 MMOL/L
WBC # BLD AUTO: 5.31 K/UL

## 2017-02-05 PROCEDURE — 25000003 PHARM REV CODE 250: Performed by: NURSE PRACTITIONER

## 2017-02-05 PROCEDURE — 85025 COMPLETE CBC W/AUTO DIFF WBC: CPT

## 2017-02-05 PROCEDURE — 80053 COMPREHEN METABOLIC PANEL: CPT

## 2017-02-05 PROCEDURE — 20600001 HC STEP DOWN PRIVATE ROOM

## 2017-02-05 PROCEDURE — 63600175 PHARM REV CODE 636 W HCPCS

## 2017-02-05 RX ORDER — HYDRALAZINE HYDROCHLORIDE 20 MG/ML
10 INJECTION INTRAMUSCULAR; INTRAVENOUS EVERY 6 HOURS PRN
Status: DISCONTINUED | OUTPATIENT
Start: 2017-02-05 | End: 2017-02-08

## 2017-02-05 RX ORDER — HEPARIN SODIUM 5000 [USP'U]/ML
5000 INJECTION, SOLUTION INTRAVENOUS; SUBCUTANEOUS EVERY 8 HOURS
Status: DISCONTINUED | OUTPATIENT
Start: 2017-02-05 | End: 2017-02-07

## 2017-02-05 RX ORDER — SODIUM CHLORIDE 9 MG/ML
INJECTION, SOLUTION INTRAVENOUS ONCE
Status: DISCONTINUED | OUTPATIENT
Start: 2017-02-05 | End: 2017-02-07

## 2017-02-05 RX ORDER — SODIUM CHLORIDE 9 MG/ML
INJECTION, SOLUTION INTRAVENOUS
Status: DISCONTINUED | OUTPATIENT
Start: 2017-02-05 | End: 2017-02-07

## 2017-02-05 RX ADMIN — CARVEDILOL 25 MG: 25 TABLET, FILM COATED ORAL at 08:02

## 2017-02-05 RX ADMIN — CLONIDINE HYDROCHLORIDE 0.1 MG: 0.1 TABLET ORAL at 08:02

## 2017-02-05 RX ADMIN — ASPIRIN 81 MG: 81 TABLET, COATED ORAL at 08:02

## 2017-02-05 RX ADMIN — HYDRALAZINE HYDROCHLORIDE 100 MG: 50 TABLET ORAL at 05:02

## 2017-02-05 RX ADMIN — HYDRALAZINE HYDROCHLORIDE 100 MG: 50 TABLET ORAL at 10:02

## 2017-02-05 RX ADMIN — HYDRALAZINE HYDROCHLORIDE 100 MG: 50 TABLET ORAL at 02:02

## 2017-02-05 RX ADMIN — HYDRALAZINE HYDROCHLORIDE 10 MG: 20 INJECTION INTRAMUSCULAR; INTRAVENOUS at 05:02

## 2017-02-05 RX ADMIN — AMLODIPINE BESYLATE 10 MG: 10 TABLET ORAL at 08:02

## 2017-02-05 NOTE — PLAN OF CARE
Problem: Patient Care Overview  Goal: Plan of Care Review  Outcome: Ongoing (interventions implemented as appropriate)  VSS. Denies any CP, SOB, palpitations, or dizziness. Fall precautions maintained. Free of fall/trauma/injury. General skin remains intact with no breakdown. Plan remains for AVR on Wed. Plan of care reviewed and updated with patient, verbalizes understanding. Patient in no acute distress. Will continue to monitor.

## 2017-02-05 NOTE — PLAN OF CARE
Problem: Patient Care Overview  Goal: Plan of Care Review  Outcome: Ongoing (interventions implemented as appropriate)  Pt free of falls/trauma/injuries this shift. Pt denies any complaints of chest pain, SOB, or dizziness. VSS. Pt ambulating independently, fall precautions remain in place. Pt scheduled for AVR on Monday. Plan of care reviewed with pt at bedside, pt voices no questions at this time. No acute distress noted throughout shift. Will continue to monitor.

## 2017-02-05 NOTE — PROGRESS NOTES
Progress Note  Cardiothoracic Surgery    Admit Date: 2/2/2017  Post-operative Day:    Hospital Day: 4    SUBJECTIVE:  No acute events overnight.  SR per monitor.     Follow-up For: Procedure(s) (LRB):  REPLACEMENT-VALVE-AORTIC (N/A)  REDO STERNOTOMY WITH AORTIC VALVE REPLACEMENT/redo sternotomy (N/A)    Scheduled Meds:   amlodipine  10 mg Oral Daily    aspirin  81 mg Oral Daily    carvedilol  25 mg Oral BID    cloNIDine  0.1 mg Oral QHS    heparin (porcine)  5,000 Units Subcutaneous Q8H    hydrALAZINE  100 mg Oral Q8H     Infusions/Drips:   PRN Meds: sodium chloride 0.9%, acetaminophen, bisacodyl, heparin (porcine)    Review of patient's allergies indicates:  No Known Allergies    OBJECTIVE:     Vital Signs (Most Recent)  Temp: 98.3 °F (36.8 °C) (02/05/17 1200)  Pulse: 74 (02/05/17 1400)  Resp: 18 (02/05/17 1200)  BP: (!) 149/80 (02/05/17 1200)  SpO2: 99 % (02/05/17 1200)    Admission Weight: 86.3 kg (190 lb 4.1 oz) (02/02/17 2035)   Most Recent Weight: 86.7 kg (191 lb 2.2 oz) (02/03/17 0500)    Vital Signs Range (Last 24H):  Temp:  [97.6 °F (36.4 °C)-98.7 °F (37.1 °C)]   Pulse:  [70-90]   Resp:  [16-18]   BP: (134-151)/(69-83)   SpO2:  [96 %-99 %]     I & O (Last 24H):    Intake/Output Summary (Last 24 hours) at 02/05/17 1501  Last data filed at 02/05/17 0600   Gross per 24 hour   Intake              710 ml   Output              575 ml   Net              135 ml     Physical Exam:  General: no distress  Lungs:  clear to auscultation bilaterally and normal respiratory effort  Heart: regular rate and rhythm, S1, S2 normal, no murmur, rub or gallop  Abdomen: soft/nontender   Extremities: warm, well perfused and no cyanosis or edema, or clubbing  Skin: Skin color, texture, turgor normal. 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 Ceron to read  Follow BP and adjust medications accordingly  Nephrology following

## 2017-02-05 NOTE — PROGRESS NOTES
"Pt refused subQ heparin injection. He stated "I don't want it. It makes my blood thin, and makes my nose bleed"  "

## 2017-02-06 LAB
ALBUMIN SERPL BCP-MCNC: 2.5 G/DL
ALP SERPL-CCNC: 79 U/L
ALT SERPL W/O P-5'-P-CCNC: 44 U/L
ANION GAP SERPL CALC-SCNC: 7 MMOL/L
AORTIC VALVE REGURGITATION: ABNORMAL
AORTIC VALVE STENOSIS: ABNORMAL
AST SERPL-CCNC: 31 U/L
BASOPHILS # BLD AUTO: 0.01 K/UL
BASOPHILS NFR BLD: 0.2 %
BILIRUB SERPL-MCNC: 0.5 MG/DL
BUN SERPL-MCNC: 62 MG/DL
CALCIUM SERPL-MCNC: 8.2 MG/DL
CHLORIDE SERPL-SCNC: 108 MMOL/L
CO2 SERPL-SCNC: 19 MMOL/L
CREAT SERPL-MCNC: 5 MG/DL
DIASTOLIC DYSFUNCTION: YES
DIFFERENTIAL METHOD: ABNORMAL
EOSINOPHIL # BLD AUTO: 0.3 K/UL
EOSINOPHIL NFR BLD: 5.9 %
ERYTHROCYTE [DISTWIDTH] IN BLOOD BY AUTOMATED COUNT: 14.2 %
EST. GFR  (AFRICAN AMERICAN): 14.8 ML/MIN/1.73 M^2
EST. GFR  (NON AFRICAN AMERICAN): 12.8 ML/MIN/1.73 M^2
ESTIMATED PA SYSTOLIC PRESSURE: 75
GLUCOSE SERPL-MCNC: 74 MG/DL
HCT VFR BLD AUTO: 24.2 %
HEPATITIS B VIRAL DNA - QUANTITATIVE: ABNORMAL IU/ML
HEPATITIS B VIRUS DNA: DETECTED
HGB BLD-MCNC: 8.1 G/DL
LOG HBV IU/ML: 8.66 LOG (10) IU/ML
LYMPHOCYTES # BLD AUTO: 1 K/UL
LYMPHOCYTES NFR BLD: 18.1 %
MAGNESIUM SERPL-MCNC: 2.1 MG/DL
MCH RBC QN AUTO: 32 PG
MCHC RBC AUTO-ENTMCNC: 33.5 %
MCV RBC AUTO: 96 FL
MONOCYTES # BLD AUTO: 0.5 K/UL
MONOCYTES NFR BLD: 10.1 %
NEUTROPHILS # BLD AUTO: 3.5 K/UL
NEUTROPHILS NFR BLD: 65.7 %
PHOSPHATE SERPL-MCNC: 3.6 MG/DL
PLATELET # BLD AUTO: 196 K/UL
PMV BLD AUTO: 10.5 FL
POTASSIUM SERPL-SCNC: 4.9 MMOL/L
PROT SERPL-MCNC: 7.1 G/DL
RBC # BLD AUTO: 2.53 M/UL
RETIRED EF AND QEF - SEE NOTES: 60 (ref 55–65)
SODIUM SERPL-SCNC: 134 MMOL/L
TRICUSPID VALVE REGURGITATION: ABNORMAL
WBC # BLD AUTO: 5.26 K/UL

## 2017-02-06 PROCEDURE — 85025 COMPLETE CBC W/AUTO DIFF WBC: CPT

## 2017-02-06 PROCEDURE — 63600175 PHARM REV CODE 636 W HCPCS

## 2017-02-06 PROCEDURE — 25000003 PHARM REV CODE 250: Performed by: INTERNAL MEDICINE

## 2017-02-06 PROCEDURE — 25000003 PHARM REV CODE 250: Performed by: NURSE PRACTITIONER

## 2017-02-06 PROCEDURE — 80100016 HC MAINTENANCE HEMODIALYSIS

## 2017-02-06 PROCEDURE — 93010 ELECTROCARDIOGRAM REPORT: CPT | Mod: ,,, | Performed by: INTERNAL MEDICINE

## 2017-02-06 PROCEDURE — 93005 ELECTROCARDIOGRAM TRACING: CPT

## 2017-02-06 PROCEDURE — 27000207 HC ISOLATION

## 2017-02-06 PROCEDURE — 93325 DOPPLER ECHO COLOR FLOW MAPG: CPT

## 2017-02-06 PROCEDURE — 90935 HEMODIALYSIS ONE EVALUATION: CPT | Mod: ,,, | Performed by: INTERNAL MEDICINE

## 2017-02-06 PROCEDURE — 80053 COMPREHEN METABOLIC PANEL: CPT

## 2017-02-06 PROCEDURE — 83735 ASSAY OF MAGNESIUM: CPT

## 2017-02-06 PROCEDURE — 84100 ASSAY OF PHOSPHORUS: CPT

## 2017-02-06 PROCEDURE — 93306 TTE W/DOPPLER COMPLETE: CPT | Mod: 26,,, | Performed by: INTERNAL MEDICINE

## 2017-02-06 PROCEDURE — 20600001 HC STEP DOWN PRIVATE ROOM

## 2017-02-06 RX ORDER — SODIUM CHLORIDE 9 MG/ML
INJECTION, SOLUTION INTRAVENOUS
Status: DISCONTINUED | OUTPATIENT
Start: 2017-02-06 | End: 2017-02-07

## 2017-02-06 RX ORDER — SODIUM CHLORIDE 9 MG/ML
INJECTION, SOLUTION INTRAVENOUS ONCE
Status: COMPLETED | OUTPATIENT
Start: 2017-02-06 | End: 2017-02-06

## 2017-02-06 RX ADMIN — HYDRALAZINE HYDROCHLORIDE 100 MG: 50 TABLET ORAL at 09:02

## 2017-02-06 RX ADMIN — HYDRALAZINE HYDROCHLORIDE 10 MG: 20 INJECTION INTRAMUSCULAR; INTRAVENOUS at 06:02

## 2017-02-06 RX ADMIN — HYDRALAZINE HYDROCHLORIDE 100 MG: 50 TABLET ORAL at 01:02

## 2017-02-06 RX ADMIN — SODIUM CHLORIDE: 0.9 INJECTION, SOLUTION INTRAVENOUS at 01:02

## 2017-02-06 RX ADMIN — HYDRALAZINE HYDROCHLORIDE 100 MG: 50 TABLET ORAL at 05:02

## 2017-02-06 RX ADMIN — ASPIRIN 81 MG: 81 TABLET, COATED ORAL at 08:02

## 2017-02-06 RX ADMIN — CARVEDILOL 25 MG: 25 TABLET, FILM COATED ORAL at 09:02

## 2017-02-06 RX ADMIN — CARVEDILOL 25 MG: 25 TABLET, FILM COATED ORAL at 08:02

## 2017-02-06 RX ADMIN — AMLODIPINE BESYLATE 10 MG: 10 TABLET ORAL at 08:02

## 2017-02-06 RX ADMIN — HEPARIN SODIUM 1000 UNITS: 1000 INJECTION, SOLUTION INTRAVENOUS; SUBCUTANEOUS at 05:02

## 2017-02-06 RX ADMIN — CLONIDINE HYDROCHLORIDE 0.1 MG: 0.1 TABLET ORAL at 09:02

## 2017-02-06 NOTE — PLAN OF CARE
Problem: Patient Care Overview  Goal: Plan of Care Review  Outcome: Ongoing (interventions implemented as appropriate)  Pt free of falls/trauma/injuries this shift. VSS, BP being maintained via scheduled medications and PRN Hydralazine. Pt voices no complaints of chest pain, SOB, palpitations or dizziness. Pt ambulating independently, fall precautions in place. General skin remains intact. CVC dressing change done today. 2D ECHO and HD done today. Plan of care reviewed with pt and pt's brother at bedside, no questions at this time. Will continue to monitor.

## 2017-02-06 NOTE — PROGRESS NOTES
Progress Note  Cardiothoracic Surgery    Admit Date: 2/2/2017  Post-operative Day:    Hospital Day: 5    SUBJECTIVE:  No significant complaints.  SR per monitor.     Follow-up For: Procedure(s) (LRB):  REPLACEMENT-VALVE-AORTIC (N/A)  REDO STERNOTOMY WITH AORTIC VALVE REPLACEMENT/redo sternotomy (N/A)    Scheduled Meds:   sodium chloride 0.9%   Intravenous Once    sodium chloride 0.9%   Intravenous Once    amlodipine  10 mg Oral Daily    aspirin  81 mg Oral Daily    carvedilol  25 mg Oral BID    cloNIDine  0.1 mg Oral QHS    heparin (porcine)  5,000 Units Subcutaneous Q8H    hydrALAZINE  100 mg Oral Q8H     Infusions/Drips:   PRN Meds: sodium chloride 0.9%, sodium chloride 0.9%, acetaminophen, bisacodyl, heparin (porcine), hydrALAZINE    Review of patient's allergies indicates:  No Known Allergies    OBJECTIVE:     Vital Signs (Most Recent)  Temp: 98.4 °F (36.9 °C) (02/06/17 1200)  Pulse: 73 (02/06/17 1200)  Resp: 18 (02/06/17 1200)  BP: (!) 140/76 (02/06/17 1200)  SpO2: 97 % (02/06/17 1200)    Admission Weight: 86.3 kg (190 lb 4.1 oz) (02/02/17 2035)   Most Recent Weight: 86.7 kg (191 lb 2.2 oz) (02/03/17 0500)    Vital Signs Range (Last 24H):  Temp:  [96.2 °F (35.7 °C)-98.4 °F (36.9 °C)]   Pulse:  [70-87]   Resp:  [16-18]   BP: (140-187)/(76-96)   SpO2:  [97 %-99 %]     I & O (Last 24H):  Intake/Output Summary (Last 24 hours) at 02/06/17 1248  Last data filed at 02/05/17 2300   Gross per 24 hour   Intake              360 ml   Output              750 ml   Net             -390 ml     Physical Exam:  General: no distress  Head: normocephalic  Lungs:  clear to auscultation bilaterally and normal respiratory effort  Heart: regular rate and rhythm, S1, S2 normal, no murmur, rub or gallop  Abdomen: soft/nontender   Extremities: warm, well perfused    Laboratory:  CBC:   Recent Labs  Lab 02/06/17  0440   WBC 5.26   RBC 2.53*   HGB 8.1*   HCT 24.2*      MCV 96   MCH 32.0*   MCHC 33.5     BMP:   Recent  Labs  Lab 02/06/17  0440   GLU 74   *   K 4.9      CO2 19*   BUN 62*   CREATININE 5.0*   CALCIUM 8.2*   MG 2.1       Diagnostic Results:  2D echo:  1 - Possible ASD closure based on images. Surgery at age 8 y.o..     2 - Concentric LVH with normal left ventricular systolic function (EF 60-65%).     3 - Biatrial enlargement.     4 - Left ventricular diastolic dysfunction with increased LAP.     5 - Appears to be native valve. The native moderate to severe aortic stenosis, JHOANA = 1.2 cm2, peak velocity = 4.2 m/s, mean gradient = 46.0 mmHg.     6 - Right coronary artery is seen arising from the aorta.     7 - Moderate tricuspid regurgitation.     8 - Pulmonary hypertension. The estimated PA systolic pressure is 75 mmHg.     9 - Right ventricular enlargement with hypertrophy, with mildly depressed systolic function.     ASSESSMENT/PLAN:     Assessment:   Active Hospital Problems    Diagnosis    Nonrheumatic aortic valve stenosis    Adult congenital heart disease    Pulmonary HTN    CKD stage 5 secondary to hypertension    Uncontrolled hypertension       Plan:   Will review echo from today  Nephrology following  Follow up at hepatology thoughts related to hep b  Monitor BP and adjust medications accordingly  Tentatively planning surgical intervention wed

## 2017-02-06 NOTE — PROGRESS NOTES
Patient had episode of SVT with rate from 149-160.  Patient complaints of palpations.  600cc fluid given during return of blood and patient spontaneously decreased to HR of 80 and no complaints.  Dr. Shah (nephrology) at bedside and ordered continuation of treatment with no UF and decreased BFR to 250

## 2017-02-06 NOTE — PROGRESS NOTES
Received report from Lashae WEAVER  Patient arrived to [facility] via stretcher and stood for weight  4 hour maintenance hemodialysis treatment started via right subclavian permacath  Net fluid removal set for 3L

## 2017-02-06 NOTE — PLAN OF CARE
Problem: Patient Care Overview  Goal: Plan of Care Review  Outcome: Ongoing (interventions implemented as appropriate)  No significant events noted throughout shift. Free of falls/trauma/inury. VSS. Denies any CP, SOB, palpitations, or dizziness. General skin remains intact with no new breakdown. Turning/repositioning independently in bed. No c/o pain or any other discomforts this shift. Brother at the bedside. Plan of care reviewed and updated, verbalizes understanding. No acute distress noted. Will continue to monitor.

## 2017-02-07 ENCOUNTER — ANESTHESIA EVENT (OUTPATIENT)
Dept: SURGERY | Facility: HOSPITAL | Age: 47
DRG: 219 | End: 2017-02-07
Payer: MEDICAID

## 2017-02-07 LAB
PRE FEV1 FVC: 73
PRE FEV1: 1.89
PRE FVC: 2.59
PREDICTED FEV1 FVC: 81
PREDICTED FEV1: 4.31
PREDICTED FVC: 5.27

## 2017-02-07 PROCEDURE — 20600001 HC STEP DOWN PRIVATE ROOM

## 2017-02-07 PROCEDURE — 25000003 PHARM REV CODE 250: Performed by: NURSE PRACTITIONER

## 2017-02-07 PROCEDURE — 94010 BREATHING CAPACITY TEST: CPT | Performed by: INTERNAL MEDICINE

## 2017-02-07 RX ADMIN — HYDRALAZINE HYDROCHLORIDE 100 MG: 50 TABLET ORAL at 05:02

## 2017-02-07 RX ADMIN — CARVEDILOL 25 MG: 25 TABLET, FILM COATED ORAL at 08:02

## 2017-02-07 RX ADMIN — AMLODIPINE BESYLATE 10 MG: 10 TABLET ORAL at 08:02

## 2017-02-07 RX ADMIN — CLONIDINE HYDROCHLORIDE 0.1 MG: 0.1 TABLET ORAL at 09:02

## 2017-02-07 RX ADMIN — ASPIRIN 81 MG: 81 TABLET, COATED ORAL at 08:02

## 2017-02-07 RX ADMIN — HYDRALAZINE HYDROCHLORIDE 100 MG: 50 TABLET ORAL at 01:02

## 2017-02-07 RX ADMIN — CARVEDILOL 25 MG: 25 TABLET, FILM COATED ORAL at 09:02

## 2017-02-07 RX ADMIN — HYDRALAZINE HYDROCHLORIDE 100 MG: 50 TABLET ORAL at 09:02

## 2017-02-07 NOTE — PROGRESS NOTES
Progress Note  Cardiothoracic Surgery    Admit Date: 2/2/2017  Post-operative Day:    Hospital Day: 6    SUBJECTIVE: No acute events overnight.  To OR tomorrow.     Follow-up For: Procedure(s) (LRB):  REPLACEMENT-VALVE-AORTIC (N/A)  REDO STERNOTOMY WITH AORTIC VALVE REPLACEMENT/redo sternotomy (N/A)    Scheduled Meds:   sodium chloride 0.9%   Intravenous Once    amlodipine  10 mg Oral Daily    aspirin  81 mg Oral Daily    carvedilol  25 mg Oral BID    cloNIDine  0.1 mg Oral QHS    [START ON 2/8/2017] epoetin vikki (PROCRIT) injection  10,000 Units Intravenous Every Mon, Wed, Fri    [START ON 2/8/2017] ferric gluconate (FERRLECIT) IVPB  125 mg Intravenous Every Mon, Wed, Fri    heparin (porcine)  5,000 Units Subcutaneous Q8H    hydrALAZINE  100 mg Oral Q8H     Infusions/Drips:   PRN Meds: sodium chloride 0.9%, sodium chloride 0.9%, acetaminophen, bisacodyl, heparin (porcine), hydrALAZINE    Review of patient's allergies indicates:  No Known Allergies    OBJECTIVE:     Vital Signs (Most Recent)  Temp: 98.8 °F (37.1 °C) (02/07/17 1140)  Pulse: 78 (02/07/17 1200)  Resp: 18 (02/07/17 1140)  BP: 138/74 (02/07/17 1140)  SpO2: 99 % (02/07/17 1140)    Admission Weight: 86.3 kg (190 lb 4.1 oz) (02/02/17 2035)   Most Recent Weight: 86.7 kg (191 lb 2.2 oz) (02/03/17 0500)    Vital Signs Range (Last 24H):  Temp:  [97.6 °F (36.4 °C)-99.3 °F (37.4 °C)]   Pulse:  []   Resp:  [14-20]   BP: (127-201)/()   SpO2:  [97 %-99 %]     I & O (Last 24H):  Intake/Output Summary (Last 24 hours) at 02/07/17 1245  Last data filed at 02/07/17 0500   Gross per 24 hour   Intake             1000 ml   Output             1909 ml   Net             -909 ml     Physical Exam:  General: no distress  Head: normocephalic  Lungs:  clear to auscultation bilaterally and normal respiratory effort  Heart: regular rate and rhythm  Abdomen: soft/nontender   Extremities: warm, well perfused  Skin: Skin color, texture, turgor normal. No rashes or  lesions      Laboratory:  CBC:   Recent Labs  Lab 02/06/17  0440   WBC 5.26   RBC 2.53*   HGB 8.1*   HCT 24.2*      MCV 96   MCH 32.0*   MCHC 33.5     BMP:   Recent Labs  Lab 02/06/17 0440   GLU 74   *   K 4.9      CO2 19*   BUN 62*   CREATININE 5.0*   CALCIUM 8.2*   MG 2.1       ASSESSMENT/PLAN:     Assessment:   Active Hospital Problems    Diagnosis    Nonrheumatic aortic valve stenosis    Adult congenital heart disease    Pulmonary HTN    CKD stage 5 secondary to hypertension    Uncontrolled hypertension             Plan:   To OR in a.m.  Pre op order  T&S  NPO after midnight  Chest prep this evening

## 2017-02-07 NOTE — PLAN OF CARE
Problem: Patient Care Overview  Goal: Plan of Care Review  Plan of care discussed with patient.  Patient ambulating independently, fall precautions in place. Patient has no complaints of pain.  Pt SBP down to the 140s. Discussed medications and care. Patient has no questions at this time.White board updated. Bed locked in lowest position. Side rails up x2. Will continue to monitor.

## 2017-02-07 NOTE — ANESTHESIA PREPROCEDURE EVALUATION
2017  Isidro White is a 47 y.o., male.    Pre-operative evaluation for Procedure(s) (LRB):  REPLACEMENT-VALVE-AORTIC (N/A)  REDO STERNOTOMY WITH AORTIC VALVE REPLACEMENT/redo sternotomy (N/A)    Isidro White is a 47 y.o. male with h/o ASD repair at 9 yo, AS with CHF wirh rEF, pulmonary HTN, active hep B, CKD on HD currently being evaluated for the above procedure.    VSS on RA. H.1    LDA:   R IJ triple lumen  R subclavian HD cath    Prev airway: none in system    Drips:   None currently    Patient Active Problem List   Diagnosis    Dehydration    Uncontrolled hypertension    Hypertensive cardiomegaly with heart failure    CKD stage 5 secondary to hypertension    Pulmonary HTN    Nonrheumatic aortic valve stenosis    Adult congenital heart disease       Review of patient's allergies indicates:  No Known Allergies     No current facility-administered medications on file prior to encounter.      Current Outpatient Prescriptions on File Prior to Encounter   Medication Sig Dispense Refill    amlodipine (NORVASC) 10 MG tablet Take 1 tablet (10 mg total) by mouth once daily. 30 tablet 1    cloNIDine (CATAPRES) 0.1 MG tablet Take 1 tablet (0.1 mg total) by mouth every evening. 30 tablet 1    hydrALAZINE (APRESOLINE) 100 MG tablet TAKE ONE TABLET BY MOUTH THREE TIMES DAILY FOR  HYPERTENSION 90 tablet 1    labetalol (NORMODYNE) 300 MG tablet Take 1 tablet (300 mg total) by mouth every 12 (twelve) hours. 60 tablet 1       Past Surgical History   Procedure Laterality Date    Cardiac surgery         Social History     Social History    Marital status: Single     Spouse name: N/A    Number of children: N/A    Years of education: N/A     Occupational History    Not on file.     Social History Main Topics    Smoking status: Never Smoker    Smokeless tobacco: Not on file     Alcohol use Yes      Comment: weekends only     Drug use: Not on file    Sexual activity: Not on file     Other Topics Concern    Not on file     Social History Narrative         Vital Signs Range (Last 24H):  Temp:  [36.4 °C (97.6 °F)-37.4 °C (99.3 °F)]   Pulse:  []   Resp:  [14-20]   BP: (127-201)/()   SpO2:  [97 %-99 %]       CBC:   Recent Labs      02/05/17   0902  02/06/17   0440   WBC  5.31  5.26   RBC  2.60*  2.53*   HGB  8.4*  8.1*   HCT  24.8*  24.2*   PLT  172  196   MCV  95  96   MCH  32.3*  32.0*   MCHC  33.9  33.5       CMP:   Recent Labs      02/05/17   0902 02/06/17   0440   NA  133*  134*   K  4.6  4.9   CL  105  108   CO2  21*  19*   BUN  52*  62*   CREATININE  4.5*  5.0*   GLU  124*  74   MG   --   2.1   PHOS   --   3.6   CALCIUM  8.1*  8.2*   ALBUMIN  2.4*  2.5*   PROT  7.0  7.1   ALKPHOS  76  79   ALT  41  44   AST  29  31   BILITOT  0.5  0.5       Diagnostic Studies:      EKG:  Vent. Rate : 085 BPM     Atrial Rate : 085 BPM     P-R Int : 160 ms          QRS Dur : 116 ms      QT Int : 396 ms       P-R-T Axes : 024 -19 137 degrees     QTc Int : 471 ms    Normal sinus rhythm  LVH with QRS widening and repolarization abnormality  Abnormal ECG  When compared with ECG of 02-FEB-2017 22:00,  No significant change was found  Confirmed by DEEPTHI NG MD ([id number]) on 2/7/2017 12:07:46 PM    2D Echo:  1 - Possible ASD closure based on images. Surgery at age 8 y.o..     2 - Concentric LVH with normal left ventricular systolic function (EF 60-65%).     3 - Biatrial enlargement.     4 - Left ventricular diastolic dysfunction with increased LAP.     5 - Appears to be native valve. The native moderate to severe aortic stenosis, JHOANA = 1.2 cm2, peak velocity = 4.2 m/s, mean gradient = 46.0 mmHg.     6 - Right coronary artery is seen arising from the aorta.     7 - Moderate tricuspid regurgitation.     8 - Pulmonary hypertension. The estimated PA systolic pressure is 75 mmHg.     9 - Right  ventricular enlargement with hypertrophy, with mildly depressed systolic function.         [facility] Anesthesia Evaluation         Review of Systems  Anesthesia Hx:  No problems with previous Anesthesia  History of prior surgery of interest to airway management or planning: Denies Family Hx of Anesthesia complications.   Denies Personal Hx of Anesthesia complications.   Hematology/Oncology:  Hematology Normal   Oncology Normal     EENT/Dental:EENT/Dental Normal   Cardiovascular:   Hypertension Valvular problems/Murmurs, AS CHF ECG has been reviewed. pulm HTN    Renal/:   Chronic Renal Disease, CRI, Dialysis    Hepatic/GI:   Hepatitis, B    Neurological:  Neurology Normal        Physical Exam  General:  Well nourished    Airway/Jaw/Neck:  Airway Findings: Mouth Opening: Normal Tongue: Normal  General Airway Assessment: Adult  Mallampati: II  Improves to I with phonation.  TM Distance: Normal, at least 6 cm     Eyes/Ears/Nose:  EYES/EARS/NOSE FINDINGS: Normal   Dental:  Dental Findings:    Chest/Lungs:  Chest/Lungs Findings: Clear to auscultation, Normal Respiratory Rate         Mental Status:  Mental Status Findings:  Cooperative, Alert and Oriented         Anesthesia Plan  Type of Anesthesia, risks & benefits discussed:  Anesthesia Type:  general  Patient's Preference:   Intra-op Monitoring Plan: standard ASA monitors, central line, arterial line, cardiac output and [place]  Intra-op Monitoring Plan Comments:   Post Op Pain Control Plan:   Post Op Pain Control Plan Comments:   Induction:   IV  Beta Blocker:  Patient is on a Beta-Blocker and has received one dose within the past 24 hours (No further documentation required).       Informed Consent: Patient understands risks and agrees with Anesthesia plan.  Questions answered. Anesthesia consent signed with patient.  ASA Score: 4     Day of Surgery Review of History & Physical: I have interviewed and examined the patient. I have reviewed the patient's H&P dated:             Ready For Surgery From Anesthesia Perspective.

## 2017-02-07 NOTE — PLAN OF CARE
Cm spoke with pt and Maykel/brother [phone] concerning dc plan. Plan to dc home to brother's home after surgery. Surgery scheduled for tomorrow Wednesday, February 8, 2017. Plan for outpt dialysis. CM gave brother dialysis list. Choice for [facility] [address], [place] [address] [phone]. Will cont to follow.          02/07/17 1046   Discharge Assessment   Assessment Type Discharge Planning Assessment   Confirmed/corrected address and phone number on facesheet? Yes   Assessment information obtained from? Patient;Caregiver   Expected Length of Stay (days) 7   Prior to hospitilization cognitive status: Alert/Oriented   Prior to hospitalization functional status: Independent   Current cognitive status: Alert/Oriented   Current Functional Status: Independent   Arrived From admitted as an inpatient   Lives With sibling(s)   Able to Return to Prior Arrangements yes   Is patient able to care for self after discharge? Yes   How many people do you have in your home that can help with your care after discharge? 2   Who are your caregiver(s) and their phone number(s)? Makyel, karelroman, [phone] cell    Patient's perception of discharge disposition admitted as an inpatient   Readmission Within The Last 30 Days no previous admission in last 30 days   Patient currently being followed by outpatient case management? No   Patient currently receives home health services? No   Does the patient currently use HME? No   Patient currently receives private duty nursing? No   Patient currently receives any other outside agency services? No   Equipment Currently Used at Home none   Do you have any problems affording any of your prescribed medications? No   Is the patient taking medications as prescribed? yes   Do you have any financial concerns preventing you from receiving the healthcare you need? Yes   Does the patient have transportation to healthcare appointments? Yes   Transportation  Available family or friend will provide   Does the patient receive services at the [facility]? No   Discharge Plan A Home with family;Other  (new dialysis set up )   Discharge Plan B Home with family   Patient/Family In Agreement With Plan yes

## 2017-02-08 ENCOUNTER — ANESTHESIA (OUTPATIENT)
Dept: SURGERY | Facility: HOSPITAL | Age: 47
DRG: 219 | End: 2017-02-08
Payer: MEDICAID

## 2017-02-08 LAB
ABO + RH BLD: NORMAL
ALBUMIN SERPL BCP-MCNC: 2.2 G/DL
ALBUMIN SERPL BCP-MCNC: 2.4 G/DL
ALLENS TEST: ABNORMAL
ALP SERPL-CCNC: 53 U/L
ALP SERPL-CCNC: 79 U/L
ALT SERPL W/O P-5'-P-CCNC: 37 U/L
ALT SERPL W/O P-5'-P-CCNC: 42 U/L
ANION GAP SERPL CALC-SCNC: 10 MMOL/L
ANION GAP SERPL CALC-SCNC: 4 MMOL/L
ANION GAP SERPL CALC-SCNC: 8 MMOL/L
ANISOCYTOSIS BLD QL SMEAR: SLIGHT
APTT BLDCRRT: 32.5 SEC
APTT BLDCRRT: >150 SEC
AST SERPL-CCNC: 27 U/L
AST SERPL-CCNC: 51 U/L
BASOPHILS # BLD AUTO: 0 K/UL
BASOPHILS # BLD AUTO: 0.01 K/UL
BASOPHILS NFR BLD: 0 %
BASOPHILS NFR BLD: 0.1 %
BASOPHILS NFR BLD: 0.1 %
BASOPHILS NFR BLD: 0.2 %
BILIRUB SERPL-MCNC: 0.6 MG/DL
BILIRUB SERPL-MCNC: 1.3 MG/DL
BLD GP AB SCN CELLS X3 SERPL QL: NORMAL
BLD PROD TYP BPU: NORMAL
BLOOD UNIT EXPIRATION DATE: NORMAL
BLOOD UNIT TYPE CODE: 5100
BLOOD UNIT TYPE: NORMAL
BUN SERPL-MCNC: 40 MG/DL
BUN SERPL-MCNC: 44 MG/DL
BUN SERPL-MCNC: 44 MG/DL
CALCIUM SERPL-MCNC: 7.6 MG/DL
CALCIUM SERPL-MCNC: 7.6 MG/DL
CALCIUM SERPL-MCNC: 8.3 MG/DL
CHLORIDE SERPL-SCNC: 107 MMOL/L
CHLORIDE SERPL-SCNC: 111 MMOL/L
CHLORIDE SERPL-SCNC: 112 MMOL/L
CO2 SERPL-SCNC: 19 MMOL/L
CO2 SERPL-SCNC: 21 MMOL/L
CO2 SERPL-SCNC: 24 MMOL/L
CODING SYSTEM: NORMAL
CREAT SERPL-MCNC: 3.9 MG/DL
CREAT SERPL-MCNC: 4.3 MG/DL
CREAT SERPL-MCNC: 4.3 MG/DL
DELSYS: ABNORMAL
DIFFERENTIAL METHOD: ABNORMAL
DISPENSE STATUS: NORMAL
EOSINOPHIL # BLD AUTO: 0.1 K/UL
EOSINOPHIL # BLD AUTO: 0.3 K/UL
EOSINOPHIL NFR BLD: 0.4 %
EOSINOPHIL NFR BLD: 1.1 %
EOSINOPHIL NFR BLD: 1.1 %
EOSINOPHIL NFR BLD: 4.3 %
ERYTHROCYTE [DISTWIDTH] IN BLOOD BY AUTOMATED COUNT: 14.3 %
ERYTHROCYTE [DISTWIDTH] IN BLOOD BY AUTOMATED COUNT: 14.3 %
ERYTHROCYTE [DISTWIDTH] IN BLOOD BY AUTOMATED COUNT: 14.4 %
ERYTHROCYTE [DISTWIDTH] IN BLOOD BY AUTOMATED COUNT: 14.6 %
ERYTHROCYTE [SEDIMENTATION RATE] IN BLOOD BY WESTERGREN METHOD: 14 MM/H
EST. GFR  (AFRICAN AMERICAN): 17.8 ML/MIN/1.73 M^2
EST. GFR  (AFRICAN AMERICAN): 17.8 ML/MIN/1.73 M^2
EST. GFR  (AFRICAN AMERICAN): 20 ML/MIN/1.73 M^2
EST. GFR  (NON AFRICAN AMERICAN): 15.4 ML/MIN/1.73 M^2
EST. GFR  (NON AFRICAN AMERICAN): 15.4 ML/MIN/1.73 M^2
EST. GFR  (NON AFRICAN AMERICAN): 17.3 ML/MIN/1.73 M^2
FIBRINOGEN PPP-MCNC: 218 MG/DL
FIBRINOGEN PPP-MCNC: 228 MG/DL
FIBRINOGEN PPP-MCNC: 281 MG/DL
FIO2: 100
FIO2: 70
FIO2: 80
FIO2: 85
FIO2: 85
FLOW: 60
GLUCOSE SERPL-MCNC: 106 MG/DL
GLUCOSE SERPL-MCNC: 108 MG/DL (ref 70–110)
GLUCOSE SERPL-MCNC: 109 MG/DL (ref 70–110)
GLUCOSE SERPL-MCNC: 111 MG/DL (ref 70–110)
GLUCOSE SERPL-MCNC: 112 MG/DL (ref 70–110)
GLUCOSE SERPL-MCNC: 113 MG/DL (ref 70–110)
GLUCOSE SERPL-MCNC: 114 MG/DL (ref 70–110)
GLUCOSE SERPL-MCNC: 115 MG/DL (ref 70–110)
GLUCOSE SERPL-MCNC: 117 MG/DL
GLUCOSE SERPL-MCNC: 117 MG/DL (ref 70–110)
GLUCOSE SERPL-MCNC: 78 MG/DL (ref 70–110)
GLUCOSE SERPL-MCNC: 79 MG/DL
GLUCOSE SERPL-MCNC: 83 MG/DL (ref 70–110)
GLUCOSE SERPL-MCNC: 94 MG/DL (ref 70–110)
GLUCOSE SERPL-MCNC: 96 MG/DL (ref 70–110)
HCO3 UR-SCNC: 19.4 MMOL/L (ref 24–28)
HCO3 UR-SCNC: 19.9 MMOL/L (ref 24–28)
HCO3 UR-SCNC: 20.7 MMOL/L (ref 24–28)
HCO3 UR-SCNC: 20.9 MMOL/L (ref 24–28)
HCO3 UR-SCNC: 21 MMOL/L (ref 24–28)
HCO3 UR-SCNC: 22.4 MMOL/L (ref 24–28)
HCO3 UR-SCNC: 22.5 MMOL/L (ref 24–28)
HCO3 UR-SCNC: 22.8 MMOL/L (ref 24–28)
HCO3 UR-SCNC: 23.9 MMOL/L (ref 24–28)
HCO3 UR-SCNC: 23.9 MMOL/L (ref 24–28)
HCO3 UR-SCNC: 24 MMOL/L (ref 24–28)
HCO3 UR-SCNC: 24.2 MMOL/L (ref 24–28)
HCO3 UR-SCNC: 24.8 MMOL/L (ref 24–28)
HCO3 UR-SCNC: 25 MMOL/L (ref 24–28)
HCO3 UR-SCNC: 25.4 MMOL/L (ref 24–28)
HCT VFR BLD AUTO: 22.7 %
HCT VFR BLD AUTO: 23.4 %
HCT VFR BLD AUTO: 26 %
HCT VFR BLD AUTO: 26.4 %
HCT VFR BLD CALC: 15 %PCV (ref 36–54)
HCT VFR BLD CALC: 15 %PCV (ref 36–54)
HCT VFR BLD CALC: 18 %PCV (ref 36–54)
HCT VFR BLD CALC: 19 %PCV (ref 36–54)
HCT VFR BLD CALC: 19 %PCV (ref 36–54)
HCT VFR BLD CALC: 20 %PCV (ref 36–54)
HCT VFR BLD CALC: 21 %PCV (ref 36–54)
HCT VFR BLD CALC: 22 %PCV (ref 36–54)
HCT VFR BLD CALC: 24 %PCV (ref 36–54)
HCT VFR BLD CALC: 25 %PCV (ref 36–54)
HCT VFR BLD CALC: 25 %PCV (ref 36–54)
HCT VFR BLD CALC: 26 %PCV (ref 36–54)
HCT VFR BLD CALC: <15 %PCV (ref 36–54)
HGB BLD-MCNC: 7.6 G/DL
HGB BLD-MCNC: 7.8 G/DL
HGB BLD-MCNC: 8.7 G/DL
HGB BLD-MCNC: 8.9 G/DL
HYPOCHROMIA BLD QL SMEAR: ABNORMAL
INR PPP: 1.2
INR PPP: 2.4
LDH SERPL L TO P-CCNC: 3.6 MMOL/L (ref 0.36–1.25)
LYMPHOCYTES # BLD AUTO: 0.6 K/UL
LYMPHOCYTES # BLD AUTO: 0.9 K/UL
LYMPHOCYTES # BLD AUTO: 1.1 K/UL
LYMPHOCYTES # BLD AUTO: 1.4 K/UL
LYMPHOCYTES NFR BLD: 13.4 %
LYMPHOCYTES NFR BLD: 7.4 %
LYMPHOCYTES NFR BLD: 8.4 %
LYMPHOCYTES NFR BLD: 9.9 %
MAGNESIUM SERPL-MCNC: 1.8 MG/DL
MAGNESIUM SERPL-MCNC: 2.2 MG/DL
MCH RBC QN AUTO: 31.3 PG
MCH RBC QN AUTO: 31.3 PG
MCH RBC QN AUTO: 31.8 PG
MCH RBC QN AUTO: 32.5 PG
MCHC RBC AUTO-ENTMCNC: 33.3 %
MCHC RBC AUTO-ENTMCNC: 33.5 %
MCHC RBC AUTO-ENTMCNC: 33.5 %
MCHC RBC AUTO-ENTMCNC: 33.7 %
MCV RBC AUTO: 93 FL
MCV RBC AUTO: 94 FL
MCV RBC AUTO: 94 FL
MCV RBC AUTO: 98 FL
MIN VOL: 12
MIN VOL: 9.7
MODE: ABNORMAL
MONOCYTES # BLD AUTO: 0.4 K/UL
MONOCYTES # BLD AUTO: 0.5 K/UL
MONOCYTES # BLD AUTO: 0.8 K/UL
MONOCYTES # BLD AUTO: 1.1 K/UL
MONOCYTES NFR BLD: 18.4 %
MONOCYTES NFR BLD: 4 %
MONOCYTES NFR BLD: 4.1 %
MONOCYTES NFR BLD: 6.4 %
NEUTROPHILS # BLD AUTO: 10.5 K/UL
NEUTROPHILS # BLD AUTO: 10.5 K/UL
NEUTROPHILS # BLD AUTO: 3.9 K/UL
NEUTROPHILS # BLD AUTO: 8.7 K/UL
NEUTROPHILS NFR BLD: 67.2 %
NEUTROPHILS NFR BLD: 81.2 %
NEUTROPHILS NFR BLD: 83.8 %
NEUTROPHILS NFR BLD: 88.1 %
NUM UNITS TRANS FFP: NORMAL
OVALOCYTES BLD QL SMEAR: ABNORMAL
PCO2 BLDA: 29.9 MMHG (ref 35–45)
PCO2 BLDA: 31.8 MMHG (ref 35–45)
PCO2 BLDA: 32.7 MMHG (ref 35–45)
PCO2 BLDA: 32.9 MMHG (ref 35–45)
PCO2 BLDA: 35 MMHG (ref 35–45)
PCO2 BLDA: 36.8 MMHG (ref 35–45)
PCO2 BLDA: 38.1 MMHG (ref 35–45)
PCO2 BLDA: 38.3 MMHG (ref 35–45)
PCO2 BLDA: 38.6 MMHG (ref 35–45)
PCO2 BLDA: 40.5 MMHG (ref 35–45)
PCO2 BLDA: 43.4 MMHG (ref 35–45)
PCO2 BLDA: 43.9 MMHG (ref 35–45)
PCO2 BLDA: 44.9 MMHG (ref 35–45)
PCO2 BLDA: 45.9 MMHG (ref 35–45)
PCO2 BLDA: 48 MMHG (ref 35–45)
PEEP: 10
PEEP: 8
PEEP: 8
PH SMN: 7.32 [PH] (ref 7.35–7.45)
PH SMN: 7.32 [PH] (ref 7.35–7.45)
PH SMN: 7.33 [PH] (ref 7.35–7.45)
PH SMN: 7.33 [PH] (ref 7.35–7.45)
PH SMN: 7.35 [PH] (ref 7.35–7.45)
PH SMN: 7.36 [PH] (ref 7.35–7.45)
PH SMN: 7.37 [PH] (ref 7.35–7.45)
PH SMN: 7.38 [PH] (ref 7.35–7.45)
PH SMN: 7.42 [PH] (ref 7.35–7.45)
PH SMN: 7.43 [PH] (ref 7.35–7.45)
PH SMN: 7.43 [PH] (ref 7.35–7.45)
PH SMN: 7.44 [PH] (ref 7.35–7.45)
PH SMN: 7.44 [PH] (ref 7.35–7.45)
PHOSPHATE SERPL-MCNC: 3.1 MG/DL
PHOSPHATE SERPL-MCNC: 3.5 MG/DL
PIP: 28
PIP: 29
PIP: 33
PLATELET # BLD AUTO: 106 K/UL
PLATELET # BLD AUTO: 134 K/UL
PLATELET # BLD AUTO: 135 K/UL
PLATELET # BLD AUTO: 167 K/UL
PLATELET BLD QL SMEAR: ABNORMAL
PMV BLD AUTO: 10.3 FL
PMV BLD AUTO: 10.8 FL
PMV BLD AUTO: 10.9 FL
PMV BLD AUTO: 11.6 FL
PO2 BLDA: 134 MMHG (ref 80–100)
PO2 BLDA: 27 MMHG (ref 40–60)
PO2 BLDA: 324 MMHG (ref 80–100)
PO2 BLDA: 332 MMHG (ref 80–100)
PO2 BLDA: 336 MMHG (ref 80–100)
PO2 BLDA: 362 MMHG (ref 80–100)
PO2 BLDA: 374 MMHG (ref 80–100)
PO2 BLDA: 394 MMHG (ref 80–100)
PO2 BLDA: 397 MMHG (ref 80–100)
PO2 BLDA: 448 MMHG (ref 80–100)
PO2 BLDA: 452 MMHG (ref 80–100)
PO2 BLDA: 46 MMHG (ref 40–60)
PO2 BLDA: 519 MMHG (ref 80–100)
PO2 BLDA: 60 MMHG (ref 80–100)
PO2 BLDA: 91 MMHG (ref 80–100)
POC BE: -1 MMOL/L
POC BE: -2 MMOL/L
POC BE: -3 MMOL/L
POC BE: -4 MMOL/L
POC BE: -4 MMOL/L
POC BE: -5 MMOL/L
POC BE: -6 MMOL/L
POC BE: 0 MMOL/L
POC BE: 1 MMOL/L
POC IONIZED CALCIUM: 0.93 MMOL/L (ref 1.06–1.42)
POC IONIZED CALCIUM: 0.99 MMOL/L (ref 1.06–1.42)
POC IONIZED CALCIUM: 1 MMOL/L (ref 1.06–1.42)
POC IONIZED CALCIUM: 1.03 MMOL/L (ref 1.06–1.42)
POC IONIZED CALCIUM: 1.04 MMOL/L (ref 1.06–1.42)
POC IONIZED CALCIUM: 1.04 MMOL/L (ref 1.06–1.42)
POC IONIZED CALCIUM: 1.08 MMOL/L (ref 1.06–1.42)
POC IONIZED CALCIUM: 1.09 MMOL/L (ref 1.06–1.42)
POC IONIZED CALCIUM: 1.09 MMOL/L (ref 1.06–1.42)
POC IONIZED CALCIUM: 1.12 MMOL/L (ref 1.06–1.42)
POC IONIZED CALCIUM: 1.12 MMOL/L (ref 1.06–1.42)
POC IONIZED CALCIUM: 1.16 MMOL/L (ref 1.06–1.42)
POC IONIZED CALCIUM: 1.16 MMOL/L (ref 1.06–1.42)
POC SATURATED O2: 100 % (ref 95–100)
POC SATURATED O2: 50 % (ref 95–100)
POC SATURATED O2: 78 % (ref 95–100)
POC SATURATED O2: 89 % (ref 95–100)
POC SATURATED O2: 97 % (ref 95–100)
POC SATURATED O2: 99 % (ref 95–100)
POC TCO2: 20 MMOL/L (ref 24–29)
POC TCO2: 21 MMOL/L (ref 23–27)
POC TCO2: 22 MMOL/L (ref 23–27)
POC TCO2: 23 MMOL/L (ref 23–27)
POC TCO2: 24 MMOL/L (ref 23–27)
POC TCO2: 24 MMOL/L (ref 24–29)
POC TCO2: 25 MMOL/L (ref 23–27)
POC TCO2: 26 MMOL/L (ref 23–27)
POC TCO2: 27 MMOL/L (ref 23–27)
POCT GLUCOSE: 136 MG/DL (ref 70–110)
POIKILOCYTOSIS BLD QL SMEAR: SLIGHT
POTASSIUM BLD-SCNC: 4.2 MMOL/L (ref 3.5–5.1)
POTASSIUM BLD-SCNC: 4.5 MMOL/L (ref 3.5–5.1)
POTASSIUM BLD-SCNC: 4.7 MMOL/L (ref 3.5–5.1)
POTASSIUM BLD-SCNC: 4.9 MMOL/L (ref 3.5–5.1)
POTASSIUM BLD-SCNC: 4.9 MMOL/L (ref 3.5–5.1)
POTASSIUM BLD-SCNC: 5.2 MMOL/L (ref 3.5–5.1)
POTASSIUM BLD-SCNC: 5.3 MMOL/L (ref 3.5–5.1)
POTASSIUM BLD-SCNC: 5.6 MMOL/L (ref 3.5–5.1)
POTASSIUM BLD-SCNC: 5.6 MMOL/L (ref 3.5–5.1)
POTASSIUM BLD-SCNC: 5.9 MMOL/L (ref 3.5–5.1)
POTASSIUM BLD-SCNC: 6 MMOL/L (ref 3.5–5.1)
POTASSIUM BLD-SCNC: 6.1 MMOL/L (ref 3.5–5.1)
POTASSIUM BLD-SCNC: 6.2 MMOL/L (ref 3.5–5.1)
POTASSIUM SERPL-SCNC: 4.5 MMOL/L
POTASSIUM SERPL-SCNC: 5 MMOL/L
POTASSIUM SERPL-SCNC: 5.4 MMOL/L
PROT SERPL-MCNC: 5.1 G/DL
PROT SERPL-MCNC: 7.1 G/DL
PROTHROMBIN TIME: 12.9 SEC
PROTHROMBIN TIME: 24.1 SEC
PS: 10
RBC # BLD AUTO: 2.4 M/UL
RBC # BLD AUTO: 2.43 M/UL
RBC # BLD AUTO: 2.78 M/UL
RBC # BLD AUTO: 2.8 M/UL
SAMPLE: ABNORMAL
SITE: ABNORMAL
SODIUM BLD-SCNC: 134 MMOL/L (ref 136–145)
SODIUM BLD-SCNC: 135 MMOL/L (ref 136–145)
SODIUM BLD-SCNC: 135 MMOL/L (ref 136–145)
SODIUM BLD-SCNC: 137 MMOL/L (ref 136–145)
SODIUM BLD-SCNC: 138 MMOL/L (ref 136–145)
SODIUM BLD-SCNC: 138 MMOL/L (ref 136–145)
SODIUM BLD-SCNC: 140 MMOL/L (ref 136–145)
SODIUM BLD-SCNC: 141 MMOL/L (ref 136–145)
SODIUM BLD-SCNC: 141 MMOL/L (ref 136–145)
SODIUM BLD-SCNC: 142 MMOL/L (ref 136–145)
SODIUM SERPL-SCNC: 135 MMOL/L
SODIUM SERPL-SCNC: 140 MMOL/L
SODIUM SERPL-SCNC: 141 MMOL/L
SP02: 91
SP02: 96
SP02: 97
TRANS ERYTHROCYTES VOL PATIENT: NORMAL ML
TRANS PLATPHERESIS VOL PATIENT: NORMAL ML
VT: 600
WBC # BLD AUTO: 10.72 K/UL
WBC # BLD AUTO: 11.96 K/UL
WBC # BLD AUTO: 12.57 K/UL
WBC # BLD AUTO: 5.76 K/UL

## 2017-02-08 PROCEDURE — 37000008 HC ANESTHESIA 1ST 15 MINUTES: Performed by: THORACIC SURGERY (CARDIOTHORACIC VASCULAR SURGERY)

## 2017-02-08 PROCEDURE — 37799 UNLISTED PX VASCULAR SURGERY: CPT

## 2017-02-08 PROCEDURE — 36430 TRANSFUSION BLD/BLD COMPNT: CPT

## 2017-02-08 PROCEDURE — 84132 ASSAY OF SERUM POTASSIUM: CPT

## 2017-02-08 PROCEDURE — 25000003 PHARM REV CODE 250: Performed by: THORACIC SURGERY (CARDIOTHORACIC VASCULAR SURGERY)

## 2017-02-08 PROCEDURE — D9220A PRA ANESTHESIA: Mod: ,,, | Performed by: ANESTHESIOLOGY

## 2017-02-08 PROCEDURE — 94761 N-INVAS EAR/PLS OXIMETRY MLT: CPT

## 2017-02-08 PROCEDURE — 25000003 PHARM REV CODE 250: Performed by: NURSE PRACTITIONER

## 2017-02-08 PROCEDURE — P9021 RED BLOOD CELLS UNIT: HCPCS

## 2017-02-08 PROCEDURE — 85520 HEPARIN ASSAY: CPT

## 2017-02-08 PROCEDURE — 27000221 HC OXYGEN, UP TO 24 HOURS

## 2017-02-08 PROCEDURE — 36415 COLL VENOUS BLD VENIPUNCTURE: CPT

## 2017-02-08 PROCEDURE — 63600175 PHARM REV CODE 636 W HCPCS

## 2017-02-08 PROCEDURE — 85730 THROMBOPLASTIN TIME PARTIAL: CPT

## 2017-02-08 PROCEDURE — P9017 PLASMA 1 DONOR FRZ W/IN 8 HR: HCPCS

## 2017-02-08 PROCEDURE — 99233 SBSQ HOSP IP/OBS HIGH 50: CPT | Mod: ,,, | Performed by: INTERNAL MEDICINE

## 2017-02-08 PROCEDURE — 27200678 HC TRANSDUCER MONITOR KIT TRIPLE: Performed by: ANESTHESIOLOGY

## 2017-02-08 PROCEDURE — 27201423 OPTIME MED/SURG SUP & DEVICES STERILE SUPPLY: Performed by: THORACIC SURGERY (CARDIOTHORACIC VASCULAR SURGERY)

## 2017-02-08 PROCEDURE — P9045 ALBUMIN (HUMAN), 5%, 250 ML: HCPCS

## 2017-02-08 PROCEDURE — 83050 HGB METHEMOGLOBIN QUAN: CPT

## 2017-02-08 PROCEDURE — 84100 ASSAY OF PHOSPHORUS: CPT

## 2017-02-08 PROCEDURE — 63600175 PHARM REV CODE 636 W HCPCS: Performed by: THORACIC SURGERY (CARDIOTHORACIC VASCULAR SURGERY)

## 2017-02-08 PROCEDURE — 25000242 PHARM REV CODE 250 ALT 637 W/ HCPCS: Performed by: THORACIC SURGERY (CARDIOTHORACIC VASCULAR SURGERY)

## 2017-02-08 PROCEDURE — C1751 CATH, INF, PER/CENT/MIDLINE: HCPCS | Performed by: ANESTHESIOLOGY

## 2017-02-08 PROCEDURE — 85730 THROMBOPLASTIN TIME PARTIAL: CPT | Mod: 91

## 2017-02-08 PROCEDURE — 85014 HEMATOCRIT: CPT

## 2017-02-08 PROCEDURE — 85384 FIBRINOGEN ACTIVITY: CPT

## 2017-02-08 PROCEDURE — 80053 COMPREHEN METABOLIC PANEL: CPT | Mod: 91

## 2017-02-08 PROCEDURE — 84100 ASSAY OF PHOSPHORUS: CPT | Mod: 91

## 2017-02-08 PROCEDURE — 88305 TISSUE EXAM BY PATHOLOGIST: CPT | Performed by: PATHOLOGY

## 2017-02-08 PROCEDURE — 36000713 HC OR TIME LEV V EA ADD 15 MIN: Performed by: THORACIC SURGERY (CARDIOTHORACIC VASCULAR SURGERY)

## 2017-02-08 PROCEDURE — 27202071 HC VOYAGER MOBILITY VEST

## 2017-02-08 PROCEDURE — 27000175 HC ADULT BYPASS PUMP

## 2017-02-08 PROCEDURE — 82803 BLOOD GASES ANY COMBINATION: CPT

## 2017-02-08 PROCEDURE — 88305 TISSUE EXAM BY PATHOLOGIST: CPT | Mod: 26,,, | Performed by: PATHOLOGY

## 2017-02-08 PROCEDURE — P9035 PLATELET PHERES LEUKOREDUCED: HCPCS

## 2017-02-08 PROCEDURE — 27201015 HC HEMO-CONCENTRATOR

## 2017-02-08 PROCEDURE — 99900035 HC TECH TIME PER 15 MIN (STAT)

## 2017-02-08 PROCEDURE — 80053 COMPREHEN METABOLIC PANEL: CPT

## 2017-02-08 PROCEDURE — 5A1221Z PERFORMANCE OF CARDIAC OUTPUT, CONTINUOUS: ICD-10-PCS | Performed by: THORACIC SURGERY (CARDIOTHORACIC VASCULAR SURGERY)

## 2017-02-08 PROCEDURE — 94640 AIRWAY INHALATION TREATMENT: CPT

## 2017-02-08 PROCEDURE — 27200953 HC CARDIOPLEGIA SYSTEM

## 2017-02-08 PROCEDURE — 33464 VALVULOPLASTY TRICUSPID: CPT | Mod: ,,, | Performed by: THORACIC SURGERY (CARDIOTHORACIC VASCULAR SURGERY)

## 2017-02-08 PROCEDURE — 83605 ASSAY OF LACTIC ACID: CPT

## 2017-02-08 PROCEDURE — 83735 ASSAY OF MAGNESIUM: CPT

## 2017-02-08 PROCEDURE — 25000003 PHARM REV CODE 250: Performed by: ANESTHESIOLOGY

## 2017-02-08 PROCEDURE — 33405 REPLACEMENT AORTIC VALVE OPN: CPT | Mod: 51,,, | Performed by: THORACIC SURGERY (CARDIOTHORACIC VASCULAR SURGERY)

## 2017-02-08 PROCEDURE — 20000000 HC ICU ROOM

## 2017-02-08 PROCEDURE — 93005 ELECTROCARDIOGRAM TRACING: CPT

## 2017-02-08 PROCEDURE — 85347 COAGULATION TIME ACTIVATED: CPT

## 2017-02-08 PROCEDURE — 27201037 HC PRESSURE MONITORING SET UP

## 2017-02-08 PROCEDURE — 36000712 HC OR TIME LEV V 1ST 15 MIN: Performed by: THORACIC SURGERY (CARDIOTHORACIC VASCULAR SURGERY)

## 2017-02-08 PROCEDURE — 36620 INSERTION CATHETER ARTERY: CPT | Mod: 59,,, | Performed by: ANESTHESIOLOGY

## 2017-02-08 PROCEDURE — 82330 ASSAY OF CALCIUM: CPT

## 2017-02-08 PROCEDURE — 27800595 HC HEART VALVES

## 2017-02-08 PROCEDURE — 27800903 OPTIME MED/SURG SUP & DEVICES OTHER IMPLANTS: Performed by: THORACIC SURGERY (CARDIOTHORACIC VASCULAR SURGERY)

## 2017-02-08 PROCEDURE — 85610 PROTHROMBIN TIME: CPT

## 2017-02-08 PROCEDURE — 37000009 HC ANESTHESIA EA ADD 15 MINS: Performed by: THORACIC SURGERY (CARDIOTHORACIC VASCULAR SURGERY)

## 2017-02-08 PROCEDURE — 85610 PROTHROMBIN TIME: CPT | Mod: 91

## 2017-02-08 PROCEDURE — 83735 ASSAY OF MAGNESIUM: CPT | Mod: 91

## 2017-02-08 PROCEDURE — C1894 INTRO/SHEATH, NON-LASER: HCPCS | Performed by: ANESTHESIOLOGY

## 2017-02-08 PROCEDURE — 25000003 PHARM REV CODE 250

## 2017-02-08 PROCEDURE — 85384 FIBRINOGEN ACTIVITY: CPT | Mod: 91

## 2017-02-08 PROCEDURE — 99900026 HC AIRWAY MAINTENANCE (STAT)

## 2017-02-08 PROCEDURE — 63600367 HC NITRIC OXIDE PER HOUR

## 2017-02-08 PROCEDURE — 80048 BASIC METABOLIC PNL TOTAL CA: CPT

## 2017-02-08 PROCEDURE — 87070 CULTURE OTHR SPECIMN AEROBIC: CPT

## 2017-02-08 PROCEDURE — 27100025 HC TUBING, SET FLUID WARMER: Performed by: ANESTHESIOLOGY

## 2017-02-08 PROCEDURE — C1729 CATH, DRAINAGE: HCPCS | Performed by: THORACIC SURGERY (CARDIOTHORACIC VASCULAR SURGERY)

## 2017-02-08 PROCEDURE — 02UJ0JZ SUPPLEMENT TRICUSPID VALVE WITH SYNTHETIC SUBSTITUTE, OPEN APPROACH: ICD-10-PCS | Performed by: THORACIC SURGERY (CARDIOTHORACIC VASCULAR SURGERY)

## 2017-02-08 PROCEDURE — 27000191 HC C-V MONITORING

## 2017-02-08 PROCEDURE — 86901 BLOOD TYPING SEROLOGIC RH(D): CPT

## 2017-02-08 PROCEDURE — 63600175 PHARM REV CODE 636 W HCPCS: Performed by: ANESTHESIOLOGY

## 2017-02-08 PROCEDURE — 27100088 HC CELL SAVER

## 2017-02-08 PROCEDURE — 86900 BLOOD TYPING SEROLOGIC ABO: CPT

## 2017-02-08 PROCEDURE — 93010 ELECTROCARDIOGRAM REPORT: CPT | Mod: ,,, | Performed by: INTERNAL MEDICINE

## 2017-02-08 PROCEDURE — 02RF0JZ REPLACEMENT OF AORTIC VALVE WITH SYNTHETIC SUBSTITUTE, OPEN APPROACH: ICD-10-PCS | Performed by: THORACIC SURGERY (CARDIOTHORACIC VASCULAR SURGERY)

## 2017-02-08 PROCEDURE — 93503 INSERT/PLACE HEART CATHETER: CPT | Mod: 59,,, | Performed by: ANESTHESIOLOGY

## 2017-02-08 PROCEDURE — 84295 ASSAY OF SERUM SODIUM: CPT

## 2017-02-08 PROCEDURE — 27800505 HC CATH, RADIAL ARTERY KIT: Performed by: ANESTHESIOLOGY

## 2017-02-08 PROCEDURE — 85025 COMPLETE CBC W/AUTO DIFF WBC: CPT | Mod: 91

## 2017-02-08 PROCEDURE — 86920 COMPATIBILITY TEST SPIN: CPT

## 2017-02-08 DEVICE — RING ANNULOPLASTY TRICUSPID: Type: IMPLANTABLE DEVICE | Site: HEART | Status: FUNCTIONAL

## 2017-02-08 RX ORDER — ETOMIDATE 2 MG/ML
INJECTION INTRAVENOUS
Status: DISCONTINUED | OUTPATIENT
Start: 2017-02-08 | End: 2017-02-08

## 2017-02-08 RX ORDER — HEPARIN SODIUM 1000 [USP'U]/ML
INJECTION, SOLUTION INTRAVENOUS; SUBCUTANEOUS
Status: DISCONTINUED | OUTPATIENT
Start: 2017-02-08 | End: 2017-02-08

## 2017-02-08 RX ORDER — PROPOFOL 10 MG/ML
INJECTION, EMULSION INTRAVENOUS
Status: COMPLETED
Start: 2017-02-08 | End: 2017-02-08

## 2017-02-08 RX ORDER — ALBUMIN HUMAN 50 G/1000ML
SOLUTION INTRAVENOUS
Status: COMPLETED
Start: 2017-02-08 | End: 2017-02-08

## 2017-02-08 RX ORDER — HYDROCODONE BITARTRATE AND ACETAMINOPHEN 500; 5 MG/1; MG/1
TABLET ORAL
Status: DISCONTINUED | OUTPATIENT
Start: 2017-02-08 | End: 2017-02-14

## 2017-02-08 RX ORDER — LIDOCAINE HCL/PF 100 MG/5ML
SYRINGE (ML) INTRAVENOUS
Status: DISCONTINUED | OUTPATIENT
Start: 2017-02-08 | End: 2017-02-08

## 2017-02-08 RX ORDER — PROPOFOL 10 MG/ML
5 INJECTION, EMULSION INTRAVENOUS CONTINUOUS
Status: DISCONTINUED | OUTPATIENT
Start: 2017-02-08 | End: 2017-02-10

## 2017-02-08 RX ORDER — ASPIRIN 325 MG
325 TABLET ORAL ONCE
Status: DISCONTINUED | OUTPATIENT
Start: 2017-02-08 | End: 2017-02-08

## 2017-02-08 RX ORDER — NOREPINEPHRINE BITARTRATE 1 MG/ML
INJECTION, SOLUTION INTRAVENOUS
Status: DISCONTINUED | OUTPATIENT
Start: 2017-02-08 | End: 2017-02-08

## 2017-02-08 RX ORDER — NICARDIPINE HYDROCHLORIDE 0.2 MG/ML
5 INJECTION INTRAVENOUS CONTINUOUS
Status: DISCONTINUED | OUTPATIENT
Start: 2017-02-08 | End: 2017-02-14

## 2017-02-08 RX ORDER — BACITRACIN 50000 [IU]/1
INJECTION, POWDER, FOR SOLUTION INTRAMUSCULAR
Status: DISCONTINUED | OUTPATIENT
Start: 2017-02-08 | End: 2017-02-08 | Stop reason: HOSPADM

## 2017-02-08 RX ORDER — ASCORBIC ACID 500 MG
500 TABLET ORAL 2 TIMES DAILY
Status: DISCONTINUED | OUTPATIENT
Start: 2017-02-08 | End: 2017-02-21 | Stop reason: HOSPADM

## 2017-02-08 RX ORDER — POLYETHYLENE GLYCOL 3350 17 G/17G
17 POWDER, FOR SOLUTION ORAL DAILY
Status: DISCONTINUED | OUTPATIENT
Start: 2017-02-09 | End: 2017-02-17

## 2017-02-08 RX ORDER — SODIUM CHLORIDE 450 MG/100ML
10 INJECTION, SOLUTION INTRAVENOUS CONTINUOUS
Status: DISCONTINUED | OUTPATIENT
Start: 2017-02-08 | End: 2017-02-17

## 2017-02-08 RX ORDER — MUPIROCIN 20 MG/G
1 OINTMENT TOPICAL 2 TIMES DAILY
Status: COMPLETED | OUTPATIENT
Start: 2017-02-08 | End: 2017-02-11

## 2017-02-08 RX ORDER — MUPIROCIN 20 MG/G
1 OINTMENT TOPICAL
Status: COMPLETED | OUTPATIENT
Start: 2017-02-08 | End: 2017-02-08

## 2017-02-08 RX ORDER — INDOMETHACIN 25 MG/1
50 CAPSULE ORAL ONCE
Status: COMPLETED | OUTPATIENT
Start: 2017-02-08 | End: 2017-02-08

## 2017-02-08 RX ORDER — MILRINONE LACTATE 0.2 MG/ML
0.75 INJECTION, SOLUTION INTRAVENOUS CONTINUOUS
Status: DISCONTINUED | OUTPATIENT
Start: 2017-02-08 | End: 2017-02-10

## 2017-02-08 RX ORDER — HYDROMORPHONE HYDROCHLORIDE 1 MG/ML
1 INJECTION, SOLUTION INTRAMUSCULAR; INTRAVENOUS; SUBCUTANEOUS EVERY 4 HOURS PRN
Status: DISCONTINUED | OUTPATIENT
Start: 2017-02-08 | End: 2017-02-10

## 2017-02-08 RX ORDER — CISATRACURIUM BESYLATE 10 MG/ML
INJECTION, SOLUTION INTRAVENOUS
Status: DISCONTINUED | OUTPATIENT
Start: 2017-02-08 | End: 2017-02-08

## 2017-02-08 RX ORDER — ACETAMINOPHEN 325 MG/1
650 TABLET ORAL EVERY 6 HOURS PRN
Status: DISCONTINUED | OUTPATIENT
Start: 2017-02-08 | End: 2017-02-21

## 2017-02-08 RX ORDER — VASOPRESSIN 20 [USP'U]/ML
INJECTION, SOLUTION INTRAMUSCULAR; SUBCUTANEOUS
Status: DISCONTINUED
Start: 2017-02-08 | End: 2017-02-08 | Stop reason: WASHOUT

## 2017-02-08 RX ORDER — CLONIDINE HYDROCHLORIDE 0.1 MG/1
0.1 TABLET ORAL NIGHTLY
Status: DISCONTINUED | OUTPATIENT
Start: 2017-02-08 | End: 2017-02-08

## 2017-02-08 RX ORDER — POTASSIUM CHLORIDE 14.9 MG/ML
60 INJECTION INTRAVENOUS
Status: DISCONTINUED | OUTPATIENT
Start: 2017-02-08 | End: 2017-02-09

## 2017-02-08 RX ORDER — PROTAMINE SULFATE 10 MG/ML
INJECTION, SOLUTION INTRAVENOUS
Status: DISCONTINUED | OUTPATIENT
Start: 2017-02-08 | End: 2017-02-08

## 2017-02-08 RX ORDER — SODIUM CHLORIDE 9 MG/ML
INJECTION, SOLUTION INTRAVENOUS CONTINUOUS PRN
Status: DISCONTINUED | OUTPATIENT
Start: 2017-02-08 | End: 2017-02-08

## 2017-02-08 RX ORDER — ALBUMIN HUMAN 50 G/1000ML
25 SOLUTION INTRAVENOUS ONCE
Status: COMPLETED | OUTPATIENT
Start: 2017-02-08 | End: 2017-02-08

## 2017-02-08 RX ORDER — FENTANYL CITRAT/DEXTROSE 5%/PF 100 MCG/10
PATIENT CONTROLLED ANALGESIA SYRINGE INTRAVENOUS CONTINUOUS
Status: DISCONTINUED | OUTPATIENT
Start: 2017-02-08 | End: 2017-02-10

## 2017-02-08 RX ORDER — INDOMETHACIN 25 MG/1
CAPSULE ORAL
Status: COMPLETED
Start: 2017-02-08 | End: 2017-02-08

## 2017-02-08 RX ORDER — CEFAZOLIN SODIUM 2 G/50ML
2 SOLUTION INTRAVENOUS
Status: COMPLETED | OUTPATIENT
Start: 2017-02-08 | End: 2017-02-10

## 2017-02-08 RX ORDER — ASPIRIN 300 MG/1
300 SUPPOSITORY RECTAL ONCE
Status: COMPLETED | OUTPATIENT
Start: 2017-02-08 | End: 2017-02-08

## 2017-02-08 RX ORDER — PANTOPRAZOLE SODIUM 40 MG/10ML
40 INJECTION, POWDER, LYOPHILIZED, FOR SOLUTION INTRAVENOUS DAILY
Status: DISCONTINUED | OUTPATIENT
Start: 2017-02-09 | End: 2017-02-13

## 2017-02-08 RX ORDER — FENTANYL CITRATE 50 UG/ML
INJECTION, SOLUTION INTRAMUSCULAR; INTRAVENOUS
Status: DISCONTINUED | OUTPATIENT
Start: 2017-02-08 | End: 2017-02-08

## 2017-02-08 RX ORDER — HYDROMORPHONE HYDROCHLORIDE 1 MG/ML
0.5 INJECTION, SOLUTION INTRAMUSCULAR; INTRAVENOUS; SUBCUTANEOUS EVERY 4 HOURS PRN
Status: DISCONTINUED | OUTPATIENT
Start: 2017-02-08 | End: 2017-02-10

## 2017-02-08 RX ORDER — ONDANSETRON 2 MG/ML
4 INJECTION INTRAMUSCULAR; INTRAVENOUS EVERY 12 HOURS PRN
Status: DISCONTINUED | OUTPATIENT
Start: 2017-02-08 | End: 2017-02-21

## 2017-02-08 RX ORDER — MAGNESIUM SULFATE HEPTAHYDRATE 40 MG/ML
2 INJECTION, SOLUTION INTRAVENOUS
Status: DISCONTINUED | OUTPATIENT
Start: 2017-02-08 | End: 2017-02-09

## 2017-02-08 RX ORDER — NOREPINEPHRINE BITARTRATE/D5W 4MG/250ML
0.02 PLASTIC BAG, INJECTION (ML) INTRAVENOUS CONTINUOUS
Status: DISCONTINUED | OUTPATIENT
Start: 2017-02-08 | End: 2017-02-10

## 2017-02-08 RX ORDER — HYDROCODONE BITARTRATE AND ACETAMINOPHEN 500; 5 MG/1; MG/1
TABLET ORAL
Status: DISCONTINUED | OUTPATIENT
Start: 2017-02-08 | End: 2017-02-08

## 2017-02-08 RX ORDER — MILRINONE LACTATE 0.2 MG/ML
INJECTION, SOLUTION INTRAVENOUS CONTINUOUS PRN
Status: DISCONTINUED | OUTPATIENT
Start: 2017-02-08 | End: 2017-02-08

## 2017-02-08 RX ORDER — MIDAZOLAM HYDROCHLORIDE 1 MG/ML
INJECTION, SOLUTION INTRAMUSCULAR; INTRAVENOUS
Status: DISCONTINUED | OUTPATIENT
Start: 2017-02-08 | End: 2017-02-08

## 2017-02-08 RX ORDER — METOCLOPRAMIDE HYDROCHLORIDE 5 MG/ML
5 INJECTION INTRAMUSCULAR; INTRAVENOUS EVERY 6 HOURS PRN
Status: DISCONTINUED | OUTPATIENT
Start: 2017-02-08 | End: 2017-02-21

## 2017-02-08 RX ORDER — AMINOCAPROIC ACID 250 MG/ML
INJECTION, SOLUTION INTRAVENOUS
Status: DISCONTINUED | OUTPATIENT
Start: 2017-02-08 | End: 2017-02-08

## 2017-02-08 RX ORDER — FUROSEMIDE 10 MG/ML
INJECTION INTRAMUSCULAR; INTRAVENOUS
Status: DISCONTINUED | OUTPATIENT
Start: 2017-02-08 | End: 2017-02-08

## 2017-02-08 RX ORDER — POTASSIUM CHLORIDE 29.8 MG/ML
40 INJECTION INTRAVENOUS
Status: DISCONTINUED | OUTPATIENT
Start: 2017-02-08 | End: 2017-02-09

## 2017-02-08 RX ORDER — NICARDIPINE HYDROCHLORIDE 0.2 MG/ML
INJECTION INTRAVENOUS CONTINUOUS PRN
Status: DISCONTINUED | OUTPATIENT
Start: 2017-02-08 | End: 2017-02-08

## 2017-02-08 RX ORDER — CEFAZOLIN SODIUM 2 G/50ML
2 SOLUTION INTRAVENOUS
Status: DISCONTINUED | OUTPATIENT
Start: 2017-02-08 | End: 2017-02-08

## 2017-02-08 RX ORDER — ONDANSETRON 2 MG/ML
INJECTION INTRAMUSCULAR; INTRAVENOUS
Status: DISCONTINUED | OUTPATIENT
Start: 2017-02-08 | End: 2017-02-08

## 2017-02-08 RX ORDER — EPINEPHRINE 1 MG/ML
INJECTION INTRAMUSCULAR; INTRAVENOUS; SUBCUTANEOUS
Status: DISPENSED
Start: 2017-02-08 | End: 2017-02-09

## 2017-02-08 RX ORDER — LACTULOSE 10 G/15ML
20 SOLUTION ORAL EVERY 6 HOURS PRN
Status: DISCONTINUED | OUTPATIENT
Start: 2017-02-08 | End: 2017-02-21

## 2017-02-08 RX ORDER — ALBUMIN HUMAN 50 G/1000ML
SOLUTION INTRAVENOUS
Status: DISPENSED
Start: 2017-02-08 | End: 2017-02-09

## 2017-02-08 RX ORDER — ALBUMIN HUMAN 50 G/1000ML
12.5 SOLUTION INTRAVENOUS ONCE
Status: COMPLETED | OUTPATIENT
Start: 2017-02-08 | End: 2017-02-08

## 2017-02-08 RX ORDER — MAGNESIUM SULFATE HEPTAHYDRATE 40 MG/ML
INJECTION, SOLUTION INTRAVENOUS
Status: DISCONTINUED | OUTPATIENT
Start: 2017-02-08 | End: 2017-02-08

## 2017-02-08 RX ORDER — NITROGLYCERIN 5 MG/ML
INJECTION, SOLUTION INTRAVENOUS
Status: DISCONTINUED | OUTPATIENT
Start: 2017-02-08 | End: 2017-02-08

## 2017-02-08 RX ORDER — ASPIRIN 325 MG
325 TABLET ORAL DAILY
Status: DISCONTINUED | OUTPATIENT
Start: 2017-02-09 | End: 2017-02-10

## 2017-02-08 RX ORDER — POTASSIUM CHLORIDE 14.9 MG/ML
20 INJECTION INTRAVENOUS
Status: DISCONTINUED | OUTPATIENT
Start: 2017-02-08 | End: 2017-02-09

## 2017-02-08 RX ORDER — IPRATROPIUM BROMIDE AND ALBUTEROL SULFATE 2.5; .5 MG/3ML; MG/3ML
3 SOLUTION RESPIRATORY (INHALATION) EVERY 4 HOURS
Status: DISCONTINUED | OUTPATIENT
Start: 2017-02-08 | End: 2017-02-15

## 2017-02-08 RX ADMIN — FENTANYL CITRATE 250 MCG: 50 INJECTION, SOLUTION INTRAMUSCULAR; INTRAVENOUS at 09:02

## 2017-02-08 RX ADMIN — SODIUM CHLORIDE, SODIUM GLUCONATE, SODIUM ACETATE, POTASSIUM CHLORIDE, MAGNESIUM CHLORIDE, SODIUM PHOSPHATE, DIBASIC, AND POTASSIUM PHOSPHATE: .53; .5; .37; .037; .03; .012; .00082 INJECTION, SOLUTION INTRAVENOUS at 08:02

## 2017-02-08 RX ADMIN — CEFAZOLIN SODIUM 2 G: 2 SOLUTION INTRAVENOUS at 10:02

## 2017-02-08 RX ADMIN — Medication 2 G: at 09:02

## 2017-02-08 RX ADMIN — ASPIRIN 300 MG: 300 SUPPOSITORY RECTAL at 08:02

## 2017-02-08 RX ADMIN — ALBUMIN HUMAN 12.5 G: 50 SOLUTION INTRAVENOUS at 08:02

## 2017-02-08 RX ADMIN — MIDAZOLAM HYDROCHLORIDE 3 MG: 1 INJECTION, SOLUTION INTRAMUSCULAR; INTRAVENOUS at 08:02

## 2017-02-08 RX ADMIN — NOREPINEPHRINE BITARTRATE 0.02 MCG/KG/MIN: 1 INJECTION, SOLUTION, CONCENTRATE INTRAVENOUS at 01:02

## 2017-02-08 RX ADMIN — ALBUMIN (HUMAN) 12.5 G: 12.5 SOLUTION INTRAVENOUS at 08:02

## 2017-02-08 RX ADMIN — MIDAZOLAM HYDROCHLORIDE 2 MG: 1 INJECTION, SOLUTION INTRAMUSCULAR; INTRAVENOUS at 07:02

## 2017-02-08 RX ADMIN — OXYCODONE HYDROCHLORIDE AND ACETAMINOPHEN 500 MG: 500 TABLET ORAL at 08:02

## 2017-02-08 RX ADMIN — MILRINONE LACTATE 0.75 MCG/KG/MIN: 200 INJECTION, SOLUTION INTRAVENOUS at 07:02

## 2017-02-08 RX ADMIN — CISATRACURIUM BESYLATE 20 MG: 10 INJECTION INTRAVENOUS at 10:02

## 2017-02-08 RX ADMIN — EPINEPHRINE 0.21 MCG/KG/MIN: 1 INJECTION PARENTERAL at 10:02

## 2017-02-08 RX ADMIN — LIDOCAINE HYDROCHLORIDE 100 MG: 20 INJECTION, SOLUTION INTRAVENOUS at 01:02

## 2017-02-08 RX ADMIN — ETOMIDATE 20 MG: 2 INJECTION, SOLUTION INTRAVENOUS at 08:02

## 2017-02-08 RX ADMIN — Medication 2 G: at 01:02

## 2017-02-08 RX ADMIN — CISATRACURIUM BESYLATE 10 MG: 10 INJECTION INTRAVENOUS at 01:02

## 2017-02-08 RX ADMIN — Medication 0.1 MCG/KG/MIN: at 05:02

## 2017-02-08 RX ADMIN — SODIUM BICARBONATE 50 MEQ: 84 INJECTION, SOLUTION INTRAVENOUS at 07:02

## 2017-02-08 RX ADMIN — MIDAZOLAM HYDROCHLORIDE 5 MG: 1 INJECTION, SOLUTION INTRAMUSCULAR; INTRAVENOUS at 03:02

## 2017-02-08 RX ADMIN — FENTANYL CITRATE 250 MCG: 50 INJECTION, SOLUTION INTRAMUSCULAR; INTRAVENOUS at 08:02

## 2017-02-08 RX ADMIN — IPRATROPIUM BROMIDE AND ALBUTEROL SULFATE 3 ML: .5; 3 SOLUTION RESPIRATORY (INHALATION) at 11:02

## 2017-02-08 RX ADMIN — ALBUMIN HUMAN 25 G: 50 SOLUTION INTRAVENOUS at 10:02

## 2017-02-08 RX ADMIN — SODIUM CHLORIDE 10 ML/HR: 0.45 INJECTION, SOLUTION INTRAVENOUS at 09:02

## 2017-02-08 RX ADMIN — AMINOCAPROIC ACID 10 G: 250 INJECTION, SOLUTION INTRAVENOUS at 08:02

## 2017-02-08 RX ADMIN — HEPARIN SODIUM 5000 UNITS: 1000 INJECTION, SOLUTION INTRAVENOUS; SUBCUTANEOUS at 11:02

## 2017-02-08 RX ADMIN — HYDRALAZINE HYDROCHLORIDE 100 MG: 50 TABLET ORAL at 05:02

## 2017-02-08 RX ADMIN — CALCIUM CHLORIDE 500 MG: 100 INJECTION, SOLUTION INTRAVENOUS at 03:02

## 2017-02-08 RX ADMIN — FENTANYL CITRATE 125 MCG: 50 INJECTION, SOLUTION INTRAMUSCULAR; INTRAVENOUS at 04:02

## 2017-02-08 RX ADMIN — MUPIROCIN 1 G: 20 OINTMENT TOPICAL at 07:02

## 2017-02-08 RX ADMIN — NOREPINEPHRINE BITARTRATE 0.02 MG: 1 INJECTION, SOLUTION, CONCENTRATE INTRAVENOUS at 11:02

## 2017-02-08 RX ADMIN — MUPIROCIN 1 G: 20 OINTMENT TOPICAL at 09:02

## 2017-02-08 RX ADMIN — ALBUMIN (HUMAN) 25 G: 12.5 SOLUTION INTRAVENOUS at 10:02

## 2017-02-08 RX ADMIN — FENTANYL CITRATE 125 MCG: 50 INJECTION, SOLUTION INTRAMUSCULAR; INTRAVENOUS at 03:02

## 2017-02-08 RX ADMIN — NICARDIPINE HYDROCHLORIDE 5 MG/HR: 0.2 INJECTION, SOLUTION INTRAVENOUS at 11:02

## 2017-02-08 RX ADMIN — CISATRACURIUM BESYLATE 10 MG: 10 INJECTION INTRAVENOUS at 11:02

## 2017-02-08 RX ADMIN — LIDOCAINE HYDROCHLORIDE 100 MG: 20 INJECTION, SOLUTION INTRAVENOUS at 10:02

## 2017-02-08 RX ADMIN — CISATRACURIUM BESYLATE 20 MG: 10 INJECTION INTRAVENOUS at 09:02

## 2017-02-08 RX ADMIN — HEPARIN SODIUM 25000 UNITS: 1000 INJECTION, SOLUTION INTRAVENOUS; SUBCUTANEOUS at 11:02

## 2017-02-08 RX ADMIN — AMINOCAPROIC ACID 1 G/HR: 250 INJECTION, SOLUTION INTRAVENOUS at 08:02

## 2017-02-08 RX ADMIN — CISATRACURIUM BESYLATE 6 MG: 10 INJECTION INTRAVENOUS at 03:02

## 2017-02-08 RX ADMIN — IPRATROPIUM BROMIDE AND ALBUTEROL SULFATE 3 ML: .5; 3 SOLUTION RESPIRATORY (INHALATION) at 07:02

## 2017-02-08 RX ADMIN — Medication 0.12 MCG/KG/MIN: at 07:02

## 2017-02-08 RX ADMIN — CISATRACURIUM BESYLATE 20 MG: 10 INJECTION INTRAVENOUS at 08:02

## 2017-02-08 RX ADMIN — INDOMETHACIN 50 MEQ: 25 CAPSULE ORAL at 10:02

## 2017-02-08 RX ADMIN — SODIUM BICARBONATE 50 MEQ: 84 INJECTION, SOLUTION INTRAVENOUS at 10:02

## 2017-02-08 RX ADMIN — Medication 2500 MCG: at 06:02

## 2017-02-08 RX ADMIN — NITROGLYCERIN 50 MCG: 5 INJECTION, SOLUTION INTRAVENOUS at 11:02

## 2017-02-08 RX ADMIN — PROPOFOL 40 MCG/KG/MIN: 10 INJECTION, EMULSION INTRAVENOUS at 05:02

## 2017-02-08 RX ADMIN — PROTAMINE SULFATE 200 MG: 10 INJECTION, SOLUTION INTRAVENOUS at 03:02

## 2017-02-08 RX ADMIN — INDOMETHACIN 50 MEQ: 25 CAPSULE ORAL at 07:02

## 2017-02-08 RX ADMIN — MAGNESIUM SULFATE IN WATER 2 G: 40 INJECTION, SOLUTION INTRAVENOUS at 01:02

## 2017-02-08 RX ADMIN — FUROSEMIDE 20 MG: 10 INJECTION, SOLUTION INTRAMUSCULAR; INTRAVENOUS at 03:02

## 2017-02-08 RX ADMIN — SODIUM CHLORIDE: 0.9 INJECTION, SOLUTION INTRAVENOUS at 07:02

## 2017-02-08 RX ADMIN — MILRINONE LACTATE 0.25 MCG/KG/MIN: 200 INJECTION, SOLUTION INTRAVENOUS at 02:02

## 2017-02-08 RX ADMIN — CISATRACURIUM BESYLATE 20 MG: 10 INJECTION INTRAVENOUS at 02:02

## 2017-02-08 RX ADMIN — MILRINONE LACTATE 0.5 MCG/KG/MIN: 200 INJECTION, SOLUTION INTRAVENOUS at 05:02

## 2017-02-08 RX ADMIN — EPINEPHRINE 0.06 MCG/KG/MIN: 1 INJECTION INTRAMUSCULAR; INTRAVENOUS; SUBCUTANEOUS at 01:02

## 2017-02-08 RX ADMIN — VASOPRESSIN 0.04 UNITS/MIN: 20 INJECTION INTRAVENOUS at 06:02

## 2017-02-08 NOTE — ANESTHESIA PROCEDURE NOTES
Arterial    Diagnosis: AI    Patient location during procedure: done in OR  Procedure start time: 2/8/2017 8:07 AM  Timeout: 2/8/2017 8:07 AM  Procedure end time: 2/8/2017 8:11 AM  Staffing  Anesthesiologist: KATI YODER  Resident/CRNA: STIVEN SOLORIO  Anesthesiologist was present at the time of the procedure.  Preanesthetic Checklist  Completed: patient identified, site marked, surgical consent, pre-op evaluation, timeout performed, IV checked, risks and benefits discussed, monitors and equipment checked and anesthesia consent given  Arterial Line  Skin Prep: chlorhexidine gluconate  Local Infiltration: lidocaine  Orientation: left  Location: radial  Catheter Size: 20 G{[facility] ANESTHESIA BLOCK ART PLACEMENTInsertion Attempts: 2  Assessment  Dressing: secured with tape and tegaderm  Patient: Tolerated well

## 2017-02-08 NOTE — BRIEF OP NOTE
[facility]  Brief Operative Note    SUMMARY     Surgery Date: 2/8/2017     Surgeon(s) and Role:     * Jose Gastelum MD - Primary     * Zafar Carroll MD - Fellow    Assisting Surgeon: None    Pre-op Diagnosis:  Severe aortic stenosis [I35.0]    Post-op Diagnosis:  Post-Op Diagnosis Codes:     * Severe aortic stenosis [I35.0]    Procedure(s) (LRB):  1)  REPLACEMENT-VALVE-AORTIC with a 22 mm Medtronic ATS open pivot mechanical prosthesis  99617  2)  TRICUSPID VALVE REPAIR with a 28 mm Medtronic Contour annuloplasty band 10917  3)  REDO STERNOTOMY WITH AORTIC VALVE REPLACEMENT/redo sternotomy   54537    Anesthesia: General        Description of the findings of the procedure: Trileaflet aortic valve. Dilated tricuspid annulus.     Estimated Blood Loss: 100 mL         Specimens:   Specimen (12h ago through future)    Start     Ordered    02/08/17 1436  Specimen to Pathology - Surgery  Once     Comments:  1.  Aortic Valve - Permanent    02/08/17 1435          Attestation:  I was present and scrubbed for the entire procedure.

## 2017-02-08 NOTE — PROGRESS NOTES
Pt seen and examined. Studies reviewed. Plan AVR/TVr.  We discussed the risks and benefits of the proposed procedure, including but not limited to death, stroke, respiratory complications, arrythmia, need for permanent pacemaker, and infection. His questions have been answered, and he wishes to proceed. After a discussion of the advantages and disadvantages of mechanical and tissue prostheses, he desires a mechanical valve.

## 2017-02-08 NOTE — PROGRESS NOTES
Pt returned to . Transferred from wheelchair to bed without assistance. No acute distress noted. Pt denies pain. 20 g peripheral IV intact. RIJ triple lumen intact. Right chestwall port intact.  No redness or swelling noted. Telemetry continued. Bed in lowest position. Call bell within reach. Side rails up x 2. Will continue to monitor.

## 2017-02-08 NOTE — PROGRESS NOTES
Pt escorted off floor via wheelchair for Chest CT. Transferred from bed to wheelchair  without assistance. No acute distress noted. Pt denies pain.  20 g peripheral IV intact. RIJ triple lumen intact. R chestwall port intact.  No redness or swelling noted. Patient transferred on telemetry. Monitor room notified. Awaiting patient's return.

## 2017-02-08 NOTE — ANESTHESIA PROCEDURE NOTES
[place] [facility]    Diagnosis: AI  Patient location during procedure: done in OR  Procedure start time: 2/8/2017 8:20 AM  Timeout: 2/8/2017 8:20 AM  Procedure end time: 2/8/2017 8:30 AM  Staffing  Anesthesiologist: KATI YODER  Resident/CRNA: STIVEN SOLORIO  Performed by: resident/CRNA   Anesthesiologist was present at the time of the procedure.  Preanesthetic Checklist  Completed: patient identified, site marked, surgical consent, pre-op evaluation, timeout performed, IV checked, risks and benefits discussed, monitors and equipment checked and anesthesia consent given  [place] [facility]  Skin Prep: chlorhexidine gluconate  Local Infiltration: none  Location: left,  internal jugular vein  Introducer: 9 Fr single lumen, manometry used.  Device: CCO/Oximetric Catheter  Catheter Size: 9 Fr  Catheter placement by maroks Bills positive aspiration all ports. PAC floated with balloon up not wedgedSterile sheath usedInsertion Attempts: 1  Indication: intravenous therapy, hemodynamic monitoring  Assessment  Central Line Bundle Protocol followed. Hand hygiene before procedure, surgical cap worn, surgical mask worn, sterile surgical gloves worn, large sterile drape used.  Verification: blood return  Dressing: secured with tape and tegaderm and sutured in place and taped  Patient: Tolerated Well

## 2017-02-08 NOTE — PLAN OF CARE
Problem: Patient Care Overview  Goal: Plan of Care Review  Plan of care discussed with patient.  Patient ambulating independently, fall precautions in place. Patient has no complaints of pain. Discussed medications and care.Pt NPO after midnight.  Patient has no questions at this time.White board updated. Bed locked in lowest position. Side rails up x2. Will continue to monitor.

## 2017-02-08 NOTE — TRANSFER OF CARE
"Anesthesia Transfer of Care Note    Patient: Isidro White    Procedure(s) Performed: Procedure(s) (LRB):  REPLACEMENT-VALVE-AORTIC (N/A)  REDO STERNOTOMY WITH AORTIC VALVE REPLACEMENT/redo sternotomy (N/A)  REPAIR-VALVE-TRICUSPID    Patient location: ICU    Anesthesia Type: general    Transport from OR: Transported from OR intubated on 100% O2 by AMBU with assisted ventilation. Upon arrival to PACU/ICU, patient attached to ventilator and auscultated to confirm bilateral breath sounds and adequate TV    Post pain: adequate analgesia    Post assessment: no apparent anesthetic complications    Post vital signs: stable    Level of consciousness: awake    Nausea/Vomiting: no nausea/vomiting    Complications: none          Last vitals:   Visit Vitals    BP (!) 158/82 (BP Location: Left arm, Patient Position: Lying, BP Method: Automatic)    Pulse 89    Temp 36.9 °C (98.5 °F) (Oral)    Resp 18    Ht 6' 2" (1.88 m)    Wt 86.7 kg (191 lb 2.2 oz)    SpO2 98%    BMI 24.54 kg/m2     "

## 2017-02-09 PROBLEM — Z95.2 S/P AVR (AORTIC VALVE REPLACEMENT): Status: ACTIVE | Noted: 2017-02-09

## 2017-02-09 PROBLEM — I95.81 POSTPROCEDURAL HYPOTENSION: Status: ACTIVE | Noted: 2017-02-09

## 2017-02-09 PROBLEM — R73.9 HYPERGLYCEMIA: Status: ACTIVE | Noted: 2017-02-09

## 2017-02-09 LAB
ALBUMIN SERPL BCP-MCNC: 2.9 G/DL
ALBUMIN SERPL BCP-MCNC: 2.9 G/DL
ALBUMIN SERPL BCP-MCNC: 3 G/DL
ALLENS TEST: ABNORMAL
ANION GAP SERPL CALC-SCNC: 10 MMOL/L
ANION GAP SERPL CALC-SCNC: 12 MMOL/L
ANION GAP SERPL CALC-SCNC: 7 MMOL/L
ANION GAP SERPL CALC-SCNC: 9 MMOL/L
APTT BLDCRRT: 28.3 SEC
APTT BLDCRRT: 28.9 SEC
APTT BLDCRRT: 47.3 SEC
BASOPHILS # BLD AUTO: 0.01 K/UL
BASOPHILS # BLD AUTO: 0.02 K/UL
BASOPHILS NFR BLD: 0.1 %
BASOPHILS NFR BLD: 0.2 %
BLD PROD TYP BPU: NORMAL
BLD PROD TYP BPU: NORMAL
BLOOD UNIT EXPIRATION DATE: NORMAL
BLOOD UNIT EXPIRATION DATE: NORMAL
BLOOD UNIT TYPE CODE: 5100
BLOOD UNIT TYPE CODE: 5100
BLOOD UNIT TYPE: NORMAL
BLOOD UNIT TYPE: NORMAL
BUN SERPL-MCNC: 47 MG/DL
BUN SERPL-MCNC: 50 MG/DL
BUN SERPL-MCNC: 55 MG/DL
BUN SERPL-MCNC: 55 MG/DL
CALCIUM SERPL-MCNC: 7.6 MG/DL
CALCIUM SERPL-MCNC: 7.7 MG/DL
CALCIUM SERPL-MCNC: 7.8 MG/DL
CALCIUM SERPL-MCNC: 8.3 MG/DL
CHLORIDE SERPL-SCNC: 109 MMOL/L
CHLORIDE SERPL-SCNC: 110 MMOL/L
CHLORIDE SERPL-SCNC: 110 MMOL/L
CHLORIDE SERPL-SCNC: 111 MMOL/L
CO2 SERPL-SCNC: 19 MMOL/L
CO2 SERPL-SCNC: 21 MMOL/L
CO2 SERPL-SCNC: 22 MMOL/L
CO2 SERPL-SCNC: 23 MMOL/L
CODING SYSTEM: NORMAL
CODING SYSTEM: NORMAL
CREAT SERPL-MCNC: 4.7 MG/DL
CREAT SERPL-MCNC: 5.2 MG/DL
CREAT SERPL-MCNC: 5.6 MG/DL
CREAT SERPL-MCNC: 5.7 MG/DL
DELSYS: ABNORMAL
DIFFERENTIAL METHOD: ABNORMAL
DIFFERENTIAL METHOD: ABNORMAL
DISPENSE STATUS: NORMAL
DISPENSE STATUS: NORMAL
EOSINOPHIL # BLD AUTO: 0.2 K/UL
EOSINOPHIL # BLD AUTO: 0.2 K/UL
EOSINOPHIL NFR BLD: 0.9 %
EOSINOPHIL NFR BLD: 1.2 %
ERYTHROCYTE [DISTWIDTH] IN BLOOD BY AUTOMATED COUNT: 15.1 %
ERYTHROCYTE [DISTWIDTH] IN BLOOD BY AUTOMATED COUNT: 16 %
ERYTHROCYTE [SEDIMENTATION RATE] IN BLOOD BY WESTERGREN METHOD: 14 MM/H
EST. GFR  (AFRICAN AMERICAN): 12.7 ML/MIN/1.73 M^2
EST. GFR  (AFRICAN AMERICAN): 12.9 ML/MIN/1.73 M^2
EST. GFR  (AFRICAN AMERICAN): 14.2 ML/MIN/1.73 M^2
EST. GFR  (AFRICAN AMERICAN): 16 ML/MIN/1.73 M^2
EST. GFR  (NON AFRICAN AMERICAN): 11 ML/MIN/1.73 M^2
EST. GFR  (NON AFRICAN AMERICAN): 11.2 ML/MIN/1.73 M^2
EST. GFR  (NON AFRICAN AMERICAN): 12.2 ML/MIN/1.73 M^2
EST. GFR  (NON AFRICAN AMERICAN): 13.8 ML/MIN/1.73 M^2
FIBRINOGEN PPP-MCNC: 235 MG/DL
FIBRINOGEN PPP-MCNC: 268 MG/DL
FIBRINOGEN PPP-MCNC: 278 MG/DL
FIBRINOGEN PPP-MCNC: 305 MG/DL
FIO2: 60
FIO2: 70
FIO2: 70
FIO2: 80
FIO2: 80
FIO2: 90
GLUCOSE SERPL-MCNC: 132 MG/DL
GLUCOSE SERPL-MCNC: 147 MG/DL
GLUCOSE SERPL-MCNC: 151 MG/DL
GLUCOSE SERPL-MCNC: 166 MG/DL
HCO3 UR-SCNC: 14.8 MMOL/L (ref 24–28)
HCO3 UR-SCNC: 19.7 MMOL/L (ref 24–28)
HCO3 UR-SCNC: 20.5 MMOL/L (ref 24–28)
HCO3 UR-SCNC: 21.1 MMOL/L (ref 24–28)
HCO3 UR-SCNC: 21.2 MMOL/L (ref 24–28)
HCO3 UR-SCNC: 21.3 MMOL/L (ref 24–28)
HCO3 UR-SCNC: 21.8 MMOL/L (ref 24–28)
HCT VFR BLD AUTO: 24.4 %
HCT VFR BLD AUTO: 26.3 %
HGB BLD-MCNC: 8.1 G/DL
HGB BLD-MCNC: 8.9 G/DL
INR PPP: 1.3
LYMPHOCYTES # BLD AUTO: 0.6 K/UL
LYMPHOCYTES # BLD AUTO: 0.7 K/UL
LYMPHOCYTES NFR BLD: 3.5 %
LYMPHOCYTES NFR BLD: 5.9 %
MAGNESIUM SERPL-MCNC: 2.1 MG/DL
MAGNESIUM SERPL-MCNC: 2.2 MG/DL
MAGNESIUM SERPL-MCNC: 2.3 MG/DL
MCH RBC QN AUTO: 30.2 PG
MCH RBC QN AUTO: 30.6 PG
MCHC RBC AUTO-ENTMCNC: 33.2 %
MCHC RBC AUTO-ENTMCNC: 33.8 %
MCV RBC AUTO: 90 FL
MCV RBC AUTO: 91 FL
METHEMOGLOBIN: 1.6 % (ref 0–3)
METHEMOGLOBIN: 1.7 % (ref 0–3)
METHEMOGLOBIN: 1.7 % (ref 0–3)
METHEMOGLOBIN: 1.9 % (ref 0–3)
METHEMOGLOBIN: 2 % (ref 0–3)
MIN VOL: 10.9
MIN VOL: 11.6
MIN VOL: 8.9
MIN VOL: 9
MODE: ABNORMAL
MONOCYTES # BLD AUTO: 1.1 K/UL
MONOCYTES # BLD AUTO: 1.4 K/UL
MONOCYTES NFR BLD: 8.1 %
MONOCYTES NFR BLD: 8.8 %
NEUTROPHILS # BLD AUTO: 10.2 K/UL
NEUTROPHILS # BLD AUTO: 14.9 K/UL
NEUTROPHILS NFR BLD: 83.7 %
NEUTROPHILS NFR BLD: 87.1 %
PCO2 BLDA: 30 MMHG (ref 35–45)
PCO2 BLDA: 33.7 MMHG (ref 35–45)
PCO2 BLDA: 34.3 MMHG (ref 35–45)
PCO2 BLDA: 38 MMHG (ref 35–45)
PCO2 BLDA: 39.3 MMHG (ref 35–45)
PCO2 BLDA: 39.7 MMHG (ref 35–45)
PCO2 BLDA: 40.5 MMHG (ref 35–45)
PEEP: 5
PEEP: 8
PH SMN: 7.3 [PH] (ref 7.35–7.45)
PH SMN: 7.33 [PH] (ref 7.35–7.45)
PH SMN: 7.34 [PH] (ref 7.35–7.45)
PH SMN: 7.34 [PH] (ref 7.35–7.45)
PH SMN: 7.36 [PH] (ref 7.35–7.45)
PH SMN: 7.37 [PH] (ref 7.35–7.45)
PH SMN: 7.39 [PH] (ref 7.35–7.45)
PHOSPHATE SERPL-MCNC: 4.4 MG/DL
PHOSPHATE SERPL-MCNC: 5.1 MG/DL
PIP: 23
PIP: 23
PIP: 26
PIP: 26
PIP: 27
PLATELET # BLD AUTO: 109 K/UL
PLATELET # BLD AUTO: 126 K/UL
PMV BLD AUTO: 10.7 FL
PMV BLD AUTO: 10.8 FL
PO2 BLDA: 103 MMHG (ref 80–100)
PO2 BLDA: 120 MMHG (ref 80–100)
PO2 BLDA: 121 MMHG (ref 80–100)
PO2 BLDA: 147 MMHG (ref 80–100)
PO2 BLDA: 207 MMHG (ref 80–100)
PO2 BLDA: 37 MMHG (ref 40–60)
PO2 BLDA: 91 MMHG (ref 80–100)
POC BE: -12 MMOL/L
POC BE: -4 MMOL/L
POC BE: -5 MMOL/L
POC BE: -5 MMOL/L
POC BE: -6 MMOL/L
POC SATURATED O2: 100 % (ref 95–100)
POC SATURATED O2: 66 % (ref 95–100)
POC SATURATED O2: 97 % (ref 95–100)
POC SATURATED O2: 98 % (ref 95–100)
POC SATURATED O2: 98 % (ref 95–100)
POC SATURATED O2: 99 % (ref 95–100)
POC SATURATED O2: 99 % (ref 95–100)
POC TCO2: 16 MMOL/L (ref 24–29)
POC TCO2: 21 MMOL/L (ref 23–27)
POC TCO2: 22 MMOL/L (ref 23–27)
POC TCO2: 23 MMOL/L (ref 23–27)
POCT GLUCOSE: 108 MG/DL (ref 70–110)
POCT GLUCOSE: 110 MG/DL (ref 70–110)
POCT GLUCOSE: 111 MG/DL (ref 70–110)
POCT GLUCOSE: 112 MG/DL (ref 70–110)
POCT GLUCOSE: 124 MG/DL (ref 70–110)
POCT GLUCOSE: 134 MG/DL (ref 70–110)
POCT GLUCOSE: 139 MG/DL (ref 70–110)
POCT GLUCOSE: 143 MG/DL (ref 70–110)
POCT GLUCOSE: 146 MG/DL (ref 70–110)
POCT GLUCOSE: 150 MG/DL (ref 70–110)
POCT GLUCOSE: 164 MG/DL (ref 70–110)
POCT GLUCOSE: 165 MG/DL (ref 70–110)
POCT GLUCOSE: 169 MG/DL (ref 70–110)
POCT GLUCOSE: 171 MG/DL (ref 70–110)
POCT GLUCOSE: 171 MG/DL (ref 70–110)
POCT GLUCOSE: 93 MG/DL (ref 70–110)
POCT GLUCOSE: 95 MG/DL (ref 70–110)
POTASSIUM SERPL-SCNC: 4.6 MMOL/L
POTASSIUM SERPL-SCNC: 4.9 MMOL/L
POTASSIUM SERPL-SCNC: 5.2 MMOL/L
POTASSIUM SERPL-SCNC: 5.6 MMOL/L
PROTHROMBIN TIME: 13.1 SEC
RBC # BLD AUTO: 2.68 M/UL
RBC # BLD AUTO: 2.91 M/UL
SAMPLE: ABNORMAL
SITE: ABNORMAL
SODIUM SERPL-SCNC: 140 MMOL/L
SODIUM SERPL-SCNC: 141 MMOL/L
SP02: 100
SP02: 9
SP02: 99
TRANS ERYTHROCYTES VOL PATIENT: NORMAL ML
TRANS ERYTHROCYTES VOL PATIENT: NORMAL ML
VT: 600
WBC # BLD AUTO: 12.11 K/UL
WBC # BLD AUTO: 17.09 K/UL

## 2017-02-09 PROCEDURE — 63600367 HC NITRIC OXIDE PER HOUR

## 2017-02-09 PROCEDURE — 25000003 PHARM REV CODE 250: Performed by: STUDENT IN AN ORGANIZED HEALTH CARE EDUCATION/TRAINING PROGRAM

## 2017-02-09 PROCEDURE — 85025 COMPLETE CBC W/AUTO DIFF WBC: CPT

## 2017-02-09 PROCEDURE — 94640 AIRWAY INHALATION TREATMENT: CPT

## 2017-02-09 PROCEDURE — 85730 THROMBOPLASTIN TIME PARTIAL: CPT

## 2017-02-09 PROCEDURE — P9045 ALBUMIN (HUMAN), 5%, 250 ML: HCPCS

## 2017-02-09 PROCEDURE — 85730 THROMBOPLASTIN TIME PARTIAL: CPT | Mod: 91

## 2017-02-09 PROCEDURE — 82803 BLOOD GASES ANY COMBINATION: CPT

## 2017-02-09 PROCEDURE — P9021 RED BLOOD CELLS UNIT: HCPCS

## 2017-02-09 PROCEDURE — 80069 RENAL FUNCTION PANEL: CPT | Mod: 91

## 2017-02-09 PROCEDURE — 94003 VENT MGMT INPAT SUBQ DAY: CPT

## 2017-02-09 PROCEDURE — 27000221 HC OXYGEN, UP TO 24 HOURS

## 2017-02-09 PROCEDURE — C9113 INJ PANTOPRAZOLE SODIUM, VIA: HCPCS | Performed by: THORACIC SURGERY (CARDIOTHORACIC VASCULAR SURGERY)

## 2017-02-09 PROCEDURE — 84100 ASSAY OF PHOSPHORUS: CPT

## 2017-02-09 PROCEDURE — 83735 ASSAY OF MAGNESIUM: CPT | Mod: 91

## 2017-02-09 PROCEDURE — 36430 TRANSFUSION BLD/BLD COMPNT: CPT

## 2017-02-09 PROCEDURE — 80048 BASIC METABOLIC PNL TOTAL CA: CPT

## 2017-02-09 PROCEDURE — 99233 SBSQ HOSP IP/OBS HIGH 50: CPT | Mod: ,,, | Performed by: INTERNAL MEDICINE

## 2017-02-09 PROCEDURE — 83735 ASSAY OF MAGNESIUM: CPT

## 2017-02-09 PROCEDURE — 37799 UNLISTED PX VASCULAR SURGERY: CPT

## 2017-02-09 PROCEDURE — 83050 HGB METHEMOGLOBIN QUAN: CPT

## 2017-02-09 PROCEDURE — 97802 MEDICAL NUTRITION INDIV IN: CPT

## 2017-02-09 PROCEDURE — 63600175 PHARM REV CODE 636 W HCPCS: Performed by: THORACIC SURGERY (CARDIOTHORACIC VASCULAR SURGERY)

## 2017-02-09 PROCEDURE — 25000242 PHARM REV CODE 250 ALT 637 W/ HCPCS: Performed by: THORACIC SURGERY (CARDIOTHORACIC VASCULAR SURGERY)

## 2017-02-09 PROCEDURE — 99232 SBSQ HOSP IP/OBS MODERATE 35: CPT | Mod: ,,, | Performed by: SURGERY

## 2017-02-09 PROCEDURE — 85610 PROTHROMBIN TIME: CPT

## 2017-02-09 PROCEDURE — P9047 ALBUMIN (HUMAN), 25%, 50ML: HCPCS

## 2017-02-09 PROCEDURE — 99900035 HC TECH TIME PER 15 MIN (STAT)

## 2017-02-09 PROCEDURE — 85384 FIBRINOGEN ACTIVITY: CPT | Mod: 91

## 2017-02-09 PROCEDURE — 36415 COLL VENOUS BLD VENIPUNCTURE: CPT

## 2017-02-09 PROCEDURE — 99232 SBSQ HOSP IP/OBS MODERATE 35: CPT | Mod: ,,, | Performed by: NURSE PRACTITIONER

## 2017-02-09 PROCEDURE — 20000000 HC ICU ROOM

## 2017-02-09 PROCEDURE — 63600175 PHARM REV CODE 636 W HCPCS

## 2017-02-09 PROCEDURE — 94761 N-INVAS EAR/PLS OXIMETRY MLT: CPT

## 2017-02-09 PROCEDURE — 99900026 HC AIRWAY MAINTENANCE (STAT)

## 2017-02-09 PROCEDURE — 25000003 PHARM REV CODE 250: Performed by: THORACIC SURGERY (CARDIOTHORACIC VASCULAR SURGERY)

## 2017-02-09 RX ORDER — INSULIN ASPART 100 [IU]/ML
0-5 INJECTION, SOLUTION INTRAVENOUS; SUBCUTANEOUS EVERY 6 HOURS PRN
Status: DISCONTINUED | OUTPATIENT
Start: 2017-02-09 | End: 2017-02-21

## 2017-02-09 RX ORDER — ALBUMIN HUMAN 250 G/1000ML
25 SOLUTION INTRAVENOUS ONCE
Status: COMPLETED | OUTPATIENT
Start: 2017-02-09 | End: 2017-02-09

## 2017-02-09 RX ORDER — CHLORHEXIDINE GLUCONATE ORAL RINSE 1.2 MG/ML
15 SOLUTION DENTAL 2 TIMES DAILY
Status: DISCONTINUED | OUTPATIENT
Start: 2017-02-09 | End: 2017-02-13

## 2017-02-09 RX ORDER — IBUPROFEN 200 MG
24 TABLET ORAL
Status: DISCONTINUED | OUTPATIENT
Start: 2017-02-09 | End: 2017-02-09

## 2017-02-09 RX ORDER — ALBUMIN HUMAN 250 G/1000ML
SOLUTION INTRAVENOUS
Status: COMPLETED
Start: 2017-02-09 | End: 2017-02-09

## 2017-02-09 RX ORDER — INSULIN ASPART 100 [IU]/ML
0-5 INJECTION, SOLUTION INTRAVENOUS; SUBCUTANEOUS
Status: DISCONTINUED | OUTPATIENT
Start: 2017-02-09 | End: 2017-02-09

## 2017-02-09 RX ORDER — GLUCAGON 1 MG
1 KIT INJECTION
Status: DISCONTINUED | OUTPATIENT
Start: 2017-02-09 | End: 2017-02-21

## 2017-02-09 RX ORDER — IBUPROFEN 200 MG
16 TABLET ORAL
Status: DISCONTINUED | OUTPATIENT
Start: 2017-02-09 | End: 2017-02-09

## 2017-02-09 RX ORDER — INDOMETHACIN 25 MG/1
50 CAPSULE ORAL ONCE
Status: COMPLETED | OUTPATIENT
Start: 2017-02-09 | End: 2017-02-09

## 2017-02-09 RX ORDER — GLUCAGON 1 MG
1 KIT INJECTION
Status: DISCONTINUED | OUTPATIENT
Start: 2017-02-09 | End: 2017-02-09

## 2017-02-09 RX ORDER — ALBUMIN HUMAN 50 G/1000ML
SOLUTION INTRAVENOUS
Status: COMPLETED
Start: 2017-02-09 | End: 2017-02-09

## 2017-02-09 RX ORDER — ALBUMIN HUMAN 50 G/1000ML
25 SOLUTION INTRAVENOUS ONCE
Status: COMPLETED | OUTPATIENT
Start: 2017-02-09 | End: 2017-02-09

## 2017-02-09 RX ORDER — HYDROCODONE BITARTRATE AND ACETAMINOPHEN 500; 5 MG/1; MG/1
TABLET ORAL
Status: DISCONTINUED | OUTPATIENT
Start: 2017-02-09 | End: 2017-02-21

## 2017-02-09 RX ORDER — HEPARIN SODIUM 10000 [USP'U]/100ML
1700 INJECTION, SOLUTION INTRAVENOUS CONTINUOUS
Status: DISCONTINUED | OUTPATIENT
Start: 2017-02-09 | End: 2017-02-14

## 2017-02-09 RX ADMIN — IPRATROPIUM BROMIDE AND ALBUTEROL SULFATE 3 ML: .5; 3 SOLUTION RESPIRATORY (INHALATION) at 07:02

## 2017-02-09 RX ADMIN — ALBUMIN HUMAN 25 G: 50 SOLUTION INTRAVENOUS at 02:02

## 2017-02-09 RX ADMIN — ALBUMIN HUMAN 25 G: 250 SOLUTION INTRAVENOUS at 07:02

## 2017-02-09 RX ADMIN — EPINEPHRINE 0.14 MCG/KG/MIN: 1 INJECTION PARENTERAL at 04:02

## 2017-02-09 RX ADMIN — Medication 0.06 MCG/KG/MIN: at 10:02

## 2017-02-09 RX ADMIN — ASPIRIN 325 MG ORAL TABLET 325 MG: 325 PILL ORAL at 08:02

## 2017-02-09 RX ADMIN — HEPARIN SODIUM AND DEXTROSE 1200 UNITS/HR: 10000; 5 INJECTION INTRAVENOUS at 04:02

## 2017-02-09 RX ADMIN — PANTOPRAZOLE SODIUM 40 MG: 40 INJECTION, POWDER, FOR SOLUTION INTRAVENOUS at 08:02

## 2017-02-09 RX ADMIN — CEFAZOLIN SODIUM 2 G: 2 SOLUTION INTRAVENOUS at 05:02

## 2017-02-09 RX ADMIN — ALBUMIN (HUMAN) 25 G: 12.5 SOLUTION INTRAVENOUS at 02:02

## 2017-02-09 RX ADMIN — IPRATROPIUM BROMIDE AND ALBUTEROL SULFATE 3 ML: .5; 3 SOLUTION RESPIRATORY (INHALATION) at 11:02

## 2017-02-09 RX ADMIN — CHLORHEXIDINE GLUCONATE 15 ML: 1.2 RINSE ORAL at 09:02

## 2017-02-09 RX ADMIN — MUPIROCIN 1 G: 20 OINTMENT TOPICAL at 10:02

## 2017-02-09 RX ADMIN — MUPIROCIN 1 G: 20 OINTMENT TOPICAL at 08:02

## 2017-02-09 RX ADMIN — IPRATROPIUM BROMIDE AND ALBUTEROL SULFATE 3 ML: .5; 3 SOLUTION RESPIRATORY (INHALATION) at 04:02

## 2017-02-09 RX ADMIN — SODIUM BICARBONATE 50 MEQ: 84 INJECTION, SOLUTION INTRAVENOUS at 07:02

## 2017-02-09 RX ADMIN — PROPOFOL 25 MCG/KG/MIN: 10 INJECTION, EMULSION INTRAVENOUS at 09:02

## 2017-02-09 RX ADMIN — Medication 0.12 MCG/KG/MIN: at 04:02

## 2017-02-09 RX ADMIN — SODIUM CHLORIDE 1 UNITS/HR: 9 INJECTION, SOLUTION INTRAVENOUS at 12:02

## 2017-02-09 RX ADMIN — ALBUMIN (HUMAN) 25 G: 12.5 SOLUTION INTRAVENOUS at 07:02

## 2017-02-09 RX ADMIN — VASOPRESSIN 0.04 UNITS/MIN: 20 INJECTION INTRAVENOUS at 10:02

## 2017-02-09 RX ADMIN — CEFAZOLIN SODIUM 2 G: 2 SOLUTION INTRAVENOUS at 10:02

## 2017-02-09 RX ADMIN — POLYETHYLENE GLYCOL 3350 17 G: 17 POWDER, FOR SOLUTION ORAL at 08:02

## 2017-02-09 RX ADMIN — EPINEPHRINE 0.18 MCG/KG/MIN: 1 INJECTION PARENTERAL at 10:02

## 2017-02-09 RX ADMIN — CEFAZOLIN SODIUM 2 G: 2 SOLUTION INTRAVENOUS at 02:02

## 2017-02-09 RX ADMIN — EPINEPHRINE 0.2 MCG/KG/MIN: 1 INJECTION PARENTERAL at 02:02

## 2017-02-09 RX ADMIN — OXYCODONE HYDROCHLORIDE AND ACETAMINOPHEN 500 MG: 500 TABLET ORAL at 11:02

## 2017-02-09 RX ADMIN — MILRINONE LACTATE 0.5 MCG/KG/MIN: 200 INJECTION, SOLUTION INTRAVENOUS at 03:02

## 2017-02-09 RX ADMIN — ALBUMIN (HUMAN) 12.5 G: 12.5 SOLUTION INTRAVENOUS at 07:02

## 2017-02-09 RX ADMIN — IPRATROPIUM BROMIDE AND ALBUTEROL SULFATE 3 ML: .5; 3 SOLUTION RESPIRATORY (INHALATION) at 03:02

## 2017-02-09 RX ADMIN — OXYCODONE HYDROCHLORIDE AND ACETAMINOPHEN 500 MG: 500 TABLET ORAL at 08:02

## 2017-02-09 RX ADMIN — CHLORHEXIDINE GLUCONATE 15 ML: 1.2 RINSE ORAL at 10:02

## 2017-02-09 NOTE — OP NOTE
DATE OF PROCEDURE:  02/08/2017    ATTENDING SURGEON:  Jose Gastelum M.D.    ASSISTANT:  Zafar Carroll M.D. (RES)    PREOPERATIVE DIAGNOSES:  1.  Moderate-to-severe aortic stenosis.  2.  Moderate-to-severe aortic insufficiency.  3.  Moderate tricuspid regurgitation.  4.  End-stage renal disease.  5.  Pulmonary hypertension.  6.  Hypertension.  7.  Previous cardiac surgery as a child, unknown type.    POSTOPERATIVE DIAGNOSES:  1.  Moderate-to-severe aortic stenosis.  2.  Moderate-to-severe aortic insufficiency.  3.  Moderate tricuspid regurgitation.  4.  End-stage renal disease.  5.  Pulmonary hypertension.  6.  Hypertension.  7.  Previous cardiac surgery as a child, unknown type.    OPERATIONS PERFORMED:  1.  Aortic valve replacement with a 22 mm Medtronic ATS open pivot mechanical   valve.  2.  Tricuspid valve repair with a 28 mm Medtronic contour annuloplasty ring.  3.  Redo sternotomy.    ANESTHESIA:  General endotracheal.    ESTIMATED BLOOD LOSS:  100 mL.    BRIEF HISTORY:  Mr. White is a 47-year-old gentleman who underwent congenital   cardiac surgery at age 8.  He is unaware exactly what the nature of that   operation was.  Unfortunately, he recently developed class IV heart failure with   preserved ejection fraction.  He was admitted to [facility], where he was   started on dialysis.  Out of concern for his questionable right ventricular   function, he was transferred to [facility] for further management.  He now presents   for aortic valve replacement and tricuspid valve repair.    PROCEDURE IN DETAIL:  After obtaining informed and written consent, the patient   was brought to the Operating Room and placed on the operating table in supine   position.  After induction of adequate general endotracheal anesthesia, the   patient's chest, abdomen, pelvis and bilateral lower extremities were prepped   and draped in the usual sterile fashion.  A wire was placed in the right common   femoral artery  using Seldinger technique.  Its position in the descending aorta   was confirmed using DAVID.  We then turned our attention to the patient's chest,   where the patient's previous upper midline skin incision was opened.  Dissection   was carried down to the level of the sternum.  The wires were divided, but not   removed.  The oscillating saw was then used to divide the anterior table of the   sternum.  The wires were removed, and the posterior table of the sternum was   divided using the straight Akins scissors.  Once the sternum was safely   traversed, the mammary retractor was placed and the electrocautery device was   used to divide the retrosternal adhesions, first on the left and then on the   right.  The sternal retractor was placed, and we began intrapericardial portion   of the dissection.  We started at the diaphragmatic surface of the heart, and   worked our way around the right atrium until we had adequate exposure of the   inferior vena cava and the superior vena cava.  We then exposed the aorta.  The   patient was systemically heparinized.  Cannulation sutures were placed in the   aorta and in the superior vena cava.  The aortic cannula was inserted, followed   by the venous.  An IVC cannula was also placed.  Antegrade and retrograde   cardioplegia catheters were placed.  The patient was then put on cardiopulmonary   bypass.  Once on bypass, the aorta was  from the pulmonary artery.    Circumferential control was obtained over the superior vena cava and the   inferior vena cava.  The aortic cross-clamp was applied, and the heart was   arrested using cold blood enhanced retrograde cardioplegia.  Once the   cardioplegia had begun, and the aorta was decompressed, a prompt oblique   aortotomy was made.  Handheld cardioplegia was given down the ostium of the   right coronary.  Once the cardioplegia was all in, the valve exposure sutures   were placed.  The aortic valve was visualized.  It was a  trileaflet valve.    There was moderate calcification.  The leaflets were cut out, and the annulus   was debrided.  The ventricle was then irrigated with a liter of cold saline.    Multiple pledgeted 2-0 Ethibond sutures were placed circumferentially around the   annulus.  Once the sutures were all in, the annulus was sized, and a 22 mm   valve was selected.  The valve was brought up, the sutures were passed through   the sewing ring, and it was slid into position.  It seated nicely.  The sutures   were tied and then cut.  The aortotomy was then closed in two layers using   running 4-0 Prolene suture.  Once the aortotomy was closed, the hot shot was   given retrograde.  De-airing maneuvers were performed, and the aortic   cross-clamp was removed.  We then turned our attention to the right atrium.  A   right atriotomy was made.  The tricuspid valve was visualized.  The leaflets   were structurally normal, but the annulus was dilated.  Multiple nonpledgeted   2-0 Ethibond sutures were placed circumferentially around the annulus from the   commissure between the anterior and septal leaflets to midway along the septal   leaflet.  The annulus was sized, and a 28 mm band was selected.  The band was   brought up, the sutures were passed through it, and it was slid into position.    It seated nicely.  The sutures were tied and then cut.  The right atriotomy was   then closed in a single layer using running 4-0 Prolene suture.  The patient was   subsequently weaned from cardiopulmonary bypass.  Initially, the right   ventricle struggled.  The patient was placed back on bypass, and loaded with   milrinone.  Nitric oxide had been utilized throughout.  After allowing the   milrinone to take effect, the patient was again weaned from cardiopulmonary   bypass.  At this time, he did separate easily from bypass.  Once off bypass, all   surgical sites were inspected.  There was good hemostasis.  The test dose of   protamine was  administered, and this was well tolerated.  The total dose was   then given.  [facility] through the total dose, all cannulas were removed.  All   surgical sites were again inspected, and again there was good hemostasis.    Drains were placed in the left chest, the right chest, and two in the   mediastinum.  Ventricular pacing wires were placed.  After again confirming   adequate hemostasis, the patient's chest was closed using #6 stainless steel   wires to reapproximate the sternum.  The overlying soft tissues were   reapproximated using absorbable suture material.  The patient's chest was washed   and dried, and a dry dressing was applied.  The patient tolerated the procedure   well, and there were no complications.  At the conclusion of the case, sponge   and instrument counts were correct.      PATRICIO  dd: 02/08/2017 16:18:05 (CST)  td: 02/08/2017 19:19:41 (CST)  Doc ID   #[id number]  Job ID #[id number]    CC:

## 2017-02-09 NOTE — PHYSICIAN QUERY
PT Name: Isidro White  MR #: [id number]     Physician Query Form - Documentation Clarification    Reviewer Fozia Sharma RN Ext [email or web address]    This form is a permanent document in the medical record.     Query Date: February 9, 2017  By submitting this query, we are merely seeking further clarification of documentation to reflect the severity of illness of your patient. Please utilize your independent clinical judgment when addressing the question(s) below.    (The Medical record reflects the following:)      Supporting Clinical Findings Location in Medical Record   AS with CHF wirh rEF    he recently developed class IV heart failure with  preserved ejection fraction CTS H&P 2/2    OP note 2/8   Echo 2/6/17   CONCLUSIONS     1 - Possible ASD closure based on images. Surgery at age 8 y.o..     2 - Concentric LVH with normal left ventricular systolic function (EF 60-65%).     3 - Biatrial enlargement.     4 - Left ventricular diastolic dysfunction with increased LAP.     5 - Appears to be native valve. The native moderate to severe aortic stenosis, JHOANA = 1.2 cm2, peak velocity = 4.2 m/s, mean gradient = 46.0 mmHg.     6 - Right coronary artery is seen arising from the aorta.     7 - Moderate tricuspid regurgitation.     8 - Pulmonary hypertension. The estimated PA systolic pressure is 75 mmHg.     9 - Right ventricular enlargement with hypertrophy, with mildly depressed systolic function.   CC H&P 2/8                                                                            Doctor, Please specify diagnosis or diagnoses associated with above clinical findings. Please clarify the conflicting CHF diagnosis    Physician Use Only      _X___ chronic diastolic heart failure      ____ chronic systolic heart failure      ____ chronic combined diastolic and systolic heart failure      ____ other                                                                                                                           [  ] Unable to determine

## 2017-02-09 NOTE — PLAN OF CARE
Recommendations     Recommendation/Intervention: 1. If unable to extubate pt over next 48 hrs, rec TF initiation of Impact Peptide 1.5 @ 10ml/hr; adv as flori'd to goal rate of 55mlhr = 1980 cals, 124 gms prot, & 1016 mls free fl 2. If pt taken off of CRRT, rec TF of Novasource Renal w/ 2 packs of Beneprotein w/ goal rate of 40 ml/hr = 1970 cals, 99 gms prot, & 688 mls free fl 3. If able to extubated pt & no swallowing difficulties detected, adv diet as flori'd to Cardiac 4. RD to monitor  Goals: TF initiation or diet advancement w/in 48-72 hrs  Nutrition Goal Status: new

## 2017-02-09 NOTE — PROGRESS NOTES
Progress Note  Nephrology    Admit Date: 2/2/2017   LOS: 6 days     SUBJECTIVE:     Follow-up For:  ESRD on HD     Pt seen in SICU after AVR. Intubated with FiO2 of 100% and PEEP of 12. Requiring 3 pressors at this time     Review of Systems:  Unable to obtain     OBJECTIVE:     Vital Signs (Most Recent)  Temp: 96.1 °F (35.6 °C) (02/08/17 1800)  Pulse: 86 (02/08/17 1829)  Resp: 14 (02/08/17 1715)  BP: (!) 106/52 (02/08/17 1800)  SpO2: 98 % (02/08/17 1829)    Vital Signs Range (Last 24H):  Temp:  [96.1 °F (35.6 °C)-98.8 °F (37.1 °C)]   Pulse:  [74-89]   Resp:  [14-18]   BP: ()/(52-94)   SpO2:  [92 %-98 %]   Arterial Line BP: (79-85)/(45-52)       Intake/Output Summary (Last 24 hours) at 02/08/17 1845  Last data filed at 02/08/17 1800   Gross per 24 hour   Intake             3192 ml   Output             1295 ml   Net             1897 ml     Physical Exam:  General: Well developed, well nourished  HEENT: Conjunctiva clear; ETT in place   Neck: No JVD noted, Supple  Chest : chest tubes in place, surgical incision dressing c/d/i  CV- Normal S1, S2 with no murmurs,gallops,rubs  Resp- Lungs CTA Bilaterally, Unlabored  Abdomen- NTND, BS normoactive x4 quads, soft  Extrem- No cyanosis, clubbing, edema.  Skin- No rashes, lesions, ulcers  Neuro: sedated, no FND   Access: permcath     Laboratory:  CBC:   Recent Labs  Lab 02/08/17  1714   WBC 11.96   RBC 2.80*   HGB 8.9*   HCT 26.4*   *   MCV 94   MCH 31.8*   MCHC 33.7     CMP:   Recent Labs  Lab 02/08/17 1714   *   CALCIUM 7.6*   ALBUMIN 2.2*   PROT 5.1*      K 5.0   CO2 21*   *   BUN 40*   CREATININE 3.9*   ALKPHOS 53*   ALT 37   AST 51*   BILITOT 1.3*     ABGs:   Recent Labs  Lab 02/08/17  1829   PH 7.380   PCO2 35.0   HCO3 20.7*   POCSATURATED 97   BE -4       Diagnostic Results:  Labs: Reviewed  X-Ray: Reviewed    ASSESSMENT/PLAN:     47 y.o. AAM with h/o ASD repair at 7 yo, aortic stenosis with CHF wirh rEF, pulmonary HTN, active hep B  transferred from [facility] for surgery eval for AVR.     Now s/p redo sternotomy with  AVR and Tricuspid valve repair (2/8/2017)     Pt hemodynamically unstable at this time requiring 3 pressors and maximal vent settings at 100 % FiO2 and 12 of PEEP  Getting about 200 cc/hr intake at this time, BP in 80s/40s.   Will hold RRT for now   Check renal panel Q6hrs.   If in case of hyperkalemia or severe acidosis with do  RRT with no net removal.   Please call on call fellow in case of abnormal labs overnight     Aixa Ceja MD   Nephrology fellow   Pager [phone]

## 2017-02-09 NOTE — H&P
History & Physical  Surgical Intensive Care    SUBJECTIVE:     Chief Complaint/Reason for Admission: Aortic Valve Insufficiency and Tricuspid regurgitation     History of Present Illness:  Patient is a 47 y.o. male with h/o ASD repair at 9 yo, AS with CHF wirh rEF, pulmonary HTN, CKDIII, active hep B transferred from [facility] for surgery eval. Recently admitted to OSH for SVT, HTN, CHF exacerbation 1/23 and discharged on coreg, clonidine, verapamil, readmitted to OSH 1/25 with chest pain and found to be in heart block which was thought to be medication induced. Pt came out of SVT, out of ICU. He had worsening of his CKD and had a tunneled RIJ line placed for HD. Pt reports still making urine. Patient underwent aortic valve replacement that was remarkable for mild difficulty going off pump due to systolic dysfunction.  Patient arrived to SICU intubated on epinephrine, milrinone, norepinephrine and vasopressin and propofol and fentanyl.     PTA Medications   Medication Sig    labetalol (NORMODYNE) 300 MG tablet Take 1 tablet (300 mg total) by mouth every 12 (twelve) hours.    amlodipine (NORVASC) 10 MG tablet Take 1 tablet (10 mg total) by mouth once daily.    cloNIDine (CATAPRES) 0.1 MG tablet Take 1 tablet (0.1 mg total) by mouth every evening.    hydrALAZINE (APRESOLINE) 100 MG tablet TAKE ONE TABLET BY MOUTH THREE TIMES DAILY FOR  HYPERTENSION       Review of patient's allergies indicates:  No Known Allergies    Past Medical History   Diagnosis Date    CKD (chronic kidney disease) stage 3, GFR 30-59 ml/min     Hypertension     Tachycardia      Past Surgical History   Procedure Laterality Date    Cardiac surgery       History reviewed. No pertinent family history.  Social History   Substance Use Topics    Smoking status: Never Smoker    Smokeless tobacco: None    Alcohol use Yes      Comment: weekends only         Review of Systems:  Unable to obtain due to patient condition.     OBJECTIVE:      Vital Signs (Most Recent)  Temp: 97.4 °F (36.3 °C) (02/08/17 1900)  Pulse: 101 (02/08/17 1936)  Resp: 17 (02/08/17 1936)  BP: (!) 104/55 (02/08/17 1845)  SpO2: (!) 94 % (02/08/17 1936)  Ventilator Data (Last 24H):     Vent Mode: A/C  Oxygen Concentration (%):  [100] 100  Resp Rate Total:  [14 br/min-19 br/min] 17 br/min  Vt Set:  [600 mL] 600 mL  PEEP/CPAP:  [8 cmH20-10 cmH20] 8 cmH20  Pressure Support:  [0 cmH20-10 cmH20] 0 cmH20  Mean Airway Pressure:  [15 uaR81-88 cmH20] 15 cmH20    Hemodynamic Parameters (Last 24H):  PAP: (39-55)/(22-25) 41/25  PAP (Mean):  [31 mmHg-36 mmHg] 36 mmHg  CO:  [4.8 L/min-5.2 L/min] 4.8 L/min  CI:  [2.3 L/min/m2-2.5 L/min/m2] 2.3 L/min/m2    Physical Exam:  General: well developed, well nourished  HENT: Head:normocephalic, atraumatic.  Eyes: conjunctivae/corneas clear. PERRL.   Neck: supple, symmetrical, trachea midline, no JVD and thyroid not enlarged, symmetric, no tenderness/mass/nodules  Lungs:  clear to auscultation bilaterally and normal respiratory effort  Cardiovascular: Heart: tachycardic with regular rhythm, S1, S2 normal, no murmur, click, rub or gallop. Chest Wall: sternotomy dressing C/D/I. Extremities: no cyanosis or edema, or clubbing. Pulses: 2+ and symmetric.  Abdomen/Rectal: Abdomen: soft, non-tender non-distented; bowel sounds normal; no masses,  no organomegaly.   Skin: Skin color, texture, turgor normal. No rashes or lesions    Lines/Drains:       Hemodialysis Catheter right subclavian (Active)   Site Assessment Clean;Dry;Intact 2/8/2017  5:30 PM   Status Clamped 2/8/2017  5:30 PM   Flows Good 2/7/2017  7:47 AM   Dressing Intervention Dressing reinforced 2/6/2017  8:00 AM   Dressing Status Clean;Dry;Intact 2/8/2017  5:30 PM   Dressing Change Due 02/08/17 2/7/2017  8:00 PM   Site Condition No complications 2/7/2017  8:00 PM   Dressing Occlusive 2/8/2017  5:30 PM   Daily Line Review Performed 2/8/2017  5:30 PM   Number of days:            Pulmonary Artery  Catheter Assessment  02/08/17 0820 (Active)   Dressing biopatch in place;dressing dry and intact 2/8/2017  5:30 PM   Securement secured w/ sutures 2/8/2017  5:30 PM   Current Insertion Depth (cm) 49 cm 2/8/2017  5:30 PM   Phlebitis 0-->no symptoms 2/8/2017  5:30 PM   Infiltration 0-->no symptoms 2/8/2017  5:30 PM   Waveform normal 2/8/2017  5:30 PM   Daily Line Review Performed 2/8/2017  5:30 PM   Number of days:0            CVC Triple Lumen - PreSep Cath 01/24/17 right internal jugular (Active)   Site Assessment Clean;Dry;Intact 2/8/2017  5:30 PM   Proximal Lumen Status Blood return noted;Flushed 2/8/2017  5:30 PM   Medial Lumen Status Blood return noted;Flushed 2/8/2017  5:30 PM   Distal Lumen Status Blood return noted;Flushed 2/8/2017  5:30 PM   Dressing Type Transparent 2/8/2017  5:30 PM   Dressing Status Clean;Dry;Intact;Biopatch in place 2/8/2017  5:30 PM   Dressing Intervention New dressing 2/7/2017  7:47 AM   Dressing Change Due 02/13/17 2/7/2017  8:00 PM   Daily Line Review Performed 2/8/2017  5:30 PM   Number of days:15            Peripheral IV - Single Lumen 07/12/16 1535 Right Antecubital (Active)   Number of days:211            Peripheral IV - Single Lumen 02/07/17 1809 Left Forearm (Active)   Site Assessment Clean;Dry;Intact 2/8/2017  5:30 PM   Line Status Infusing 2/8/2017  5:30 PM   Dressing Status Clean;Dry;Intact 2/8/2017  5:30 PM   Dressing Intervention New dressing 2/7/2017  6:10 PM   Dressing Change Due 02/11/17 2/7/2017  8:00 PM   Site Change Due 02/11/17 2/7/2017  8:00 PM   Reason Not Rotated Not due 2/7/2017  8:00 PM   Number of days:1            Arterial Line 02/08/17 0807 Left Radial (Active)   Site Assessment Clean;Dry;Intact 2/8/2017  5:30 PM   Line Status Pulsatile blood flow 2/8/2017  5:30 PM   Art Line Waveform Appropriate;Square wave test performed 2/8/2017  5:30 PM   Arterial Line Interventions Zeroed and calibrated;Leveled;Connections checked and tightened;Flushed per protocol;Line  pulled back 2/8/2017  5:30 PM   Color/Movement/Sensation Capillary refill less than 3 sec 2/8/2017  5:30 PM   Dressing Type Transparent 2/8/2017  5:30 PM   Dressing Status Clean;Dry;Intact 2/8/2017  5:30 PM   Number of days:0            Pacer Wires 02/08/17 1542 (Active)   Pacer Wire Status Ventricular wires disconnected from pacer 2/8/2017  5:30 PM   Site Assessment Other (Comment) 2/8/2017  5:30 PM   How Pacer Wires are Secured Ventricular wires secured to dressing 2/8/2017  5:30 PM   Dressing Status Clean;Dry;Intact 2/8/2017  5:30 PM   Number of days:0            NG/OG Tube 02/08/17 1100 (Active)   Placement Check placement verified by aspirate pH;placement verified by aspirate characteristics 2/8/2017  5:30 PM   pH Aspirate Result 5 2/8/2017  5:30 PM   Tolerance no signs/symptoms of discomfort 2/8/2017  5:30 PM   Securement taped to commercial device 2/8/2017  5:30 PM   Clamp Status/Tolerance unclamped 2/8/2017  5:30 PM   Suction Setting/Drainage Method suction at;low;intermittent setting 2/8/2017  5:30 PM   Insertion Site Appearance no redness, warmth, tenderness, skin breakdown, drainage 2/8/2017  5:30 PM   Number of days:0            Urethral Catheter 02/08/17 0850 Non-latex;Straight-tip;Temperature probe 16 Fr. (Active)   Site Assessment Clean;Intact 2/8/2017  5:30 PM   Collection Container Urimeter 2/8/2017  5:30 PM   Securement Method secured to top of thigh w/ adhesive device 2/8/2017  5:30 PM   Catheter Care Performed yes 2/8/2017  5:30 PM   Reason for Continuing Urinary Catheterization Critically ill in ICU requiring intensive monitoring;Post operative 2/8/2017  5:30 PM   CAUTI Prevention Bundle Drainage bag off the floor;No dependent loops or kinks 2/8/2017  5:30 PM   Output (mL) 10 mL 2/8/2017  6:00 PM   Number of days:0            Y Chest Tube 1 and 2 02/08/17 1552 Right Mediastinal 19 Fr. 2 Left Mediastinal 19 Fr. (Active)   Function -20 cm H2O 2/8/2017  5:30 PM   Air Leak/Fluctuation air leak not  present 2/8/2017  5:30 PM   Safety all tubing connections taped 2/8/2017  5:30 PM   Securement tubing anchored to body distal to insertion site w/ tape 2/8/2017  5:30 PM   Left Subcutaneous Emphysema none present 2/8/2017  5:30 PM   Right Subcutaneous Emphysema none present 2/8/2017  5:30 PM   Patency Intervention Stripped;Tip/tilt 2/8/2017  5:30 PM   Drainage Description 1 Sanguineous 2/8/2017  5:30 PM   Tube 1 Dressing Appearance occlusive gauze dressing intact 2/8/2017  7:47 PM   Site Assessment 1 Not assessed 2/8/2017  7:47 PM   Surrounding Skin 1 Unable to view 2/8/2017  5:30 PM   Drainage Description 2 Sanguineous 2/8/2017  5:30 PM   Tube 2 Dressing Appearance occlusive gauze dressing intact 2/8/2017  7:47 PM   Site Assessment 2 Not assessed 2/8/2017  7:47 PM   Output (mL) 10 mL 2/8/2017  6:00 PM   Number of days:0            Y Chest Tube 3 and 4 02/08/17 1553 3 Right Pleural 19 Fr. 4 Left Pleural 19 Fr. (Active)   Function -20 cm H2O 2/8/2017  5:30 PM   Safety all tubing connections taped;all connections secured;suction checked 2/8/2017  5:30 PM   Securement tubing anchored to body distal to insertion site w/ tape 2/8/2017  5:30 PM   Left Subcutaneous Emphysema none present 2/8/2017  5:30 PM   Right Subcutaneous Emphysema none present 2/8/2017  5:30 PM   Patency Intervention Stripped;Tip/tilt 2/8/2017  5:30 PM   Drainage Description 3 Sanguineous 2/8/2017  5:30 PM   Tube 3 Dressing Appearance occlusive gauze dressing intact 2/8/2017  7:47 PM   Site Assessment 3 Not assessed 2/8/2017  7:47 PM   Surrounding Skin 3 Unable to view 2/8/2017  5:30 PM   Drainage Description 4 Sanguineous 2/8/2017  5:30 PM   Tube 4 Dressing Appearance occlusive gauze dressing intact 2/8/2017  7:47 PM   Site Assessment 4 Not assessed 2/8/2017  7:47 PM   Output (mL) 130 mL 2/8/2017  6:00 PM   Number of days:0       Laboratory  CBC:   Recent Labs  Lab 02/08/17  1714   WBC 11.96   RBC 2.80*   HGB 8.9*   HCT 26.4*   *   MCV 94    MCH 31.8*   MCHC 33.7     CMP:   Recent Labs  Lab 02/08/17  1714   *   CALCIUM 7.6*   ALBUMIN 2.2*   PROT 5.1*      K 5.0   CO2 21*   *   BUN 40*   CREATININE 3.9*   ALKPHOS 53*   ALT 37   AST 51*   BILITOT 1.3*     LFTs:   Recent Labs  Lab 02/08/17  1714   ALT 37   AST 51*   ALKPHOS 53*   BILITOT 1.3*   PROT 5.1*   ALBUMIN 2.2*     Coagulation:   Recent Labs  Lab 02/08/17  1714   INR 1.2   APTT 32.5*     Cardiac markers: No results for input(s): CKMB, CPKMB, TROPONINT, TROPONINI, MYOGLOBIN in the last 168 hours.  ABGs:   Recent Labs  Lab 02/08/17  1829   PH 7.380   PCO2 35.0   PO2 91   HCO3 20.7*   POCSATURATED 97   BE -4       Chest X-Ray:   Ordered per primary  Will f/u     Diagnostic Results:  Echo 2/6/17   CONCLUSIONS     1 - Possible ASD closure based on images. Surgery at age 8 y.o..     2 - Concentric LVH with normal left ventricular systolic function (EF 60-65%).     3 - Biatrial enlargement.     4 - Left ventricular diastolic dysfunction with increased LAP.     5 - Appears to be native valve. The native moderate to severe aortic stenosis, JHOANA = 1.2 cm2, peak velocity = 4.2 m/s, mean gradient = 46.0 mmHg.     6 - Right coronary artery is seen arising from the aorta.     7 - Moderate tricuspid regurgitation.     8 - Pulmonary hypertension. The estimated PA systolic pressure is 75 mmHg.     9 - Right ventricular enlargement with hypertrophy, with mildly depressed systolic function.       EKG:   Normal sinus rhythm  LVH with QRS widening and repolarization abnormality  Abnormal ECG  When compared with ECG of 02-FEB-2017 22:00,  No significant change was found  Confirmed by HERNANDEZ PHAM, DEEPTHI ([id number]) on 2/7/2017 12:07:46 PM      CTchest:      1. Aorta maintains normal caliber, contour and course.  Calcification of the aortic valve is present.  There is also bulky calcification in the cardiac fibrous skeleton at the junction of aortic and mitral bowels.  LEFT ventricle and RIGHT atrium appear  enlarged but detail is limited on this study performed without intravenous contrast medium and without gating.    2. Retrosternal structures were reviewed in this patient with planned repeat sternotomy.      - There are seven sternal sutures, the first three at the level of the manubrium.      - There is minimal fat  the posterior table of the manubrium from the anterior margin of the LEFT innominate vein.      - At least 1 cm of fat separates the sternomanubrial junction from the anterior wall of the distal ascending aorta.  The aorta remains well removed from the posterior table of the sternum inferiorly.      - However the anterior wall of the RIGHT ventricle is closely opposed to the posterior table of the sternum between the fourth and fifth sternal sutures extending inferiorly past the seventh sternal suture.  This finding is especially well seen on sagittal series 602 image 39.    3. Solitary 0.3 cm pulmonary nodule within the posterior basal aspect of the left lower lobe (axial series 2, image 32). Per Fleischner Society guidelines; in a low risk patient, no follow up is needed. In a high risk patient (e.g. history of smoking or malignancy), those guidelines recommend 12 month CT chest follow up to assess for stability 2/2018.    4. Dilated hepatic veins suggest elevated RIGHT heart pressures, tricuspid insufficiency or both.    5. Moderate volume abdominal ascites which is incompletely visualized.  Etiology of the intraperitoneal fluid is unclear aerated may be related to elevated RIGHT heart pressures.    ASSESSMENT/PLAN:     Plan:  Neuro:     Sedated with propofol and fentanyl     Pulmonary:   Intubated with high vent settings    Cardiac:  S/p AVR   CHF   Remains pressor dependent     Renal:   Requiring RRT,    But nephrology will hold tonight as patient is unstable     Infectious Disease:   Active Hep B   Ancef per primary for post  Op prophylaxis     Hematology/Oncology:   WBC WNL  H/H stable  8.9/26.4     Endocrine:  No issues currently, not on insulin gtt   Glu 111     Fluids/Electrolytes/Nutrition/GI:   NPO currently   Replete electrolytes prn being mindful of renal function     Pain Management:   Fentanyl gtt   Dilaudid 1mg q4h prn       Aishwarya Elam MD  CA1   Anesthesiology

## 2017-02-09 NOTE — CONSULTS
[facility]  Adult Nutrition  Consult Note    SUMMARY     Recommendations    Recommendation/Intervention: 1. If unable to extubate pt over next 48 hrs, rec TF initiation of Impact Peptide 1.5 @ 10ml/hr; adv as flori'd to goal rate of 55mlhr = 1980 cals, 124 gms prot, & 1016 mls free fl  2. If pt taken off of CRRT, rec TF of Novasource Renal w/ 2 packs of Beneprotein w/ goal rate of 40 ml/hr = 1970 cals, 99 gms prot, & 688 mls free fl   3. If able to extubated pt & no swallowing difficulties detected, adv diet as flori'd to Cardiac   4. RD to monitor  Goals: TF initiation or diet advancement w/in 48-72 hrs  Nutrition Goal Status: new       Continuum of Care Plan    Referral to Outpatient Services: other (see comments) (Nutr D/c Plan: unable to determine @ this time)    Reason for Assessment    Reason for Assessment: physician consult  Diagnosis: cardiac disease, other (see comments) (s/p AVR/TVR 2/8/17)  Relevent Medical History: AS w/ CHF, pulm HTN, CKD stg 3, Hep B   Interdisciplinary Rounds: attended     General Information Comments: Pt intubated, sedated. No fmly present to speak to. CRRT to be initiated today per MD notes    Nutrition Prescription Ordered    Current Diet Order: NPO         Evaluation of Received Nutrients/Fluid Intake            Other Calories (kcal): 203 (via propofol)        Energy Calories Required: not meeting needs                 Protein Required: not meeting needs        IV Fluid (mL):  (MIVF @ 10ml/hr)  Other Fluid (mL):  (IVPB meds)      Fluid Required: other (see comments) (per MD)  Comments: I/O noted, +5.3 L since admit; LBM 2/4        Nutrition Risk Screen     Nutrition Risk Screen: no indicators present    Nutrition/Diet History       Typical Food/Fluid Intake: adequate prior to NPO/sx per documentation  Food Preferences: JENNIFER        Factors Affecting Nutritional Intake: on mechanical ventilation, NPO                Labs/Tests/Procedures/Meds       Pertinent Labs  Reviewed: reviewed  Pertinent Labs Comments: WBC 17.09, K 5.6, BUN 50, Crt 5.2, Glu 166, POCT's 124-171  Pertinent Medications Reviewed: reviewed  Pertinent Medications Comments: propofol, epi, fentanyl, vaso drips, albumin, vit C, abx, pantoprazole, Na bicarb    Physical Findings    Overall Physical Appearance: other (see comments), on ventilator support (nourished)  Tubes: orogastric tube, other (see comments) (chest tubes in place)     Skin: other (see comments) (sx incisions; Ky 15)    Anthropometrics       Height (inches): 74.02 in  Weight Method: Standard Scale  Weight (kg): 86.7 kg  Ideal Body Weight (IBW), Male: 190.12 lb     % Ideal Body Weight, Male (lb): 100.54 lb     BMI (kg/m2): 24.53  BMI Grade: 18.5-24.9 - normal  Usual Body Weight (UBW), kg:  (unable to obtain)           Estimated/Assessed Needs    Weight Used For Calorie Calculations: 86.7 kg (191 lb 2.2 oz)   Height (cm): 188 cm     Energy Need Method: [place] [employer] (= 2317 cals daily)     RMR ([place]-St. Jeor Equation): 1819.81        Weight Used For Protein Calculations: 86.7 kg (191 lb 2.2 oz)  Protein Requirements: 104-122 gms (1.2-1.4 gms/kg)    Fluid Need Method: other (see comments) (per MD)     Monitor and Evaluation    Food and Nutrient Intake: energy intake  Food and Nutrient Adminstration: diet order        Anthropometric Measurements: weight, weight change  Biochemical Data, Medical Tests and Procedures: electrolyte and renal panel, glucose/endocrine profile, inflammatory profile  Nutrition-Focused Physical Findings: overall appearance    Nutrition Risk    Level of Risk: high    Nutrition Follow-Up    RD Follow-up?: Yes    Assessment and Plan    Nutrition dx/problem: Increased nutrient needs  Related to/etiology: physiological factors s/p cardiac sx  As evidenced by: EEN/EPN  Status: New

## 2017-02-09 NOTE — PLAN OF CARE
Problem: Patient Care Overview  Goal: Plan of Care Review  PHM: CHF, pulmonary HTN, CKD3, Hep B    PSH: ASD repair 9 y/o    Hospital Course:    2/8: Admit SICU Aortic valve replacement, Tricuspid valve repair, Redo sternotomy; 1L albumin, 2 amps bicarb, 2 unit PRBC    Nursing:  * Accucheck Q1   Outcome: Ongoing (interventions implemented as appropriate)  Plan of care reviewed with pt and pt sister.  Blood pressure low for most of shift.  Total of 2 amps bicarb and 1 L albumin administered along with 2 units PRBC.  Epi decreased from 0.21 to 0.14mcg/kg/min.  Levophed at 0.14mcg/kg/min.  Milrinone at 0.5mcg/kg/min.  Vasopressin at 0.04mcg/kg/min as well.  Propfol at 15 for sedation as well as Fentanyl at 75.  Insulin drip started and currently at 1.6ml/hr.  SVO2 recalibrated to 66% and CI has been in the 3's.  PA pressures have been increased but MD aware.  Foi2 on vent decreased from 100%-70% and 8 of PEEP.  Urine output about 50ml for entire shift.  Chest tubes with decreasing serosanguineous output.  Nitric remains at 40ppm.  Sister Maggie updated on plan of care.  All questions answered and reassurance provided.

## 2017-02-09 NOTE — PROGRESS NOTES
Progress Note  Surgical Intensive Care    Admit Date: 2/2/2017  Post-operative Day: 1 Day Post-Op  Hospital Day: 8    SUBJECTIVE:     Follow-up For:  Procedure(s) (LRB):  REPLACEMENT-VALVE-AORTIC (N/A)  REDO STERNOTOMY WITH AORTIC VALVE REPLACEMENT/redo sternotomy (N/A)  REPAIR-VALVE-TRICUSPID    HPI: Patient is a 47 y.o. male with h/o ASD repair at 9 yo, AS with CHF wirh rEF, pulmonary HTN, CKDIII, active hep B transferred from [facility] for surgery eval. Recently admitted to OSH for SVT, HTN, CHF exacerbation 1/23 and discharged on coreg, clonidine, verapamil, readmitted to OSH 1/25 with chest pain and found to be in heart block which was thought to be medication induced. Pt came out of SVT, out of ICU. He had worsening of his CKD and had a tunneled RIJ line placed for HD. Pt reports still making urine. Patient underwent aortic valve replacement that was remarkable for mild difficulty going off pump due to systolic dysfunction. Patient arrived to SICU intubated on epinephrine, milrinone, norepinephrine and vasopressin and propofol and fentanyl.     Interval history: Patient remains vent and vasopressor dependent with elevating PA pressures. Weaning pressors with blood administration.     Continuous Infusions:   sodium chloride 0.45% 10 mL/hr (02/09/17 0600)    epinephrine 0.16 mcg/kg/min (02/09/17 0600)    fentanyl 7.5 mcg/hr (02/09/17 0600)    insulin (HUMAN R) infusion (adults) 1 Units/hr (02/09/17 0000)    milrinone 20mg/100ml D5W (200mcg/ml) 0.5 mcg/kg/min (02/09/17 0600)    nicardipine      nitric oxide gas      norepinephrine bitartrate-D5W 0.16 mcg/kg/min (02/09/17 0600)    propofol 15 mcg/kg/min (02/09/17 0600)    vasopressin (PITRESSIN) infusion 0.08 Units/min (02/09/17 0500)     Scheduled Meds:   albumin human 5%        albuterol-ipratropium 2.5mg-0.5mg/3mL  3 mL Nebulization Q4H    ascorbic acid (vitamin C)  500 mg Oral BID    aspirin  325 mg Oral Daily    ceFAZolin 2 g/50mL  Dextrose IVPB  2 g Intravenous Q8H    EPINEPHrine        epoetin vikki (PROCRIT) injection  10,000 Units Intravenous Every Mon, Wed, Fri    ferric gluconate (FERRLECIT) IVPB  125 mg Intravenous Every Mon, Wed, Fri    mupirocin  1 g Nasal BID    pantoprazole  40 mg Intravenous Daily    polyethylene glycol  17 g Per G Tube Daily     PRN Meds:sodium chloride, acetaminophen, dextrose 50%, dextrose 50%, heparin (porcine), HYDROmorphone, HYDROmorphone, lactulose, magnesium sulfate IVPB, metoclopramide HCl, ondansetron, potassium chloride **AND** potassium chloride **AND** potassium chloride, sodium phosphate IVPB, sodium phosphate IVPB, sodium phosphate IVPB    Review of patient's allergies indicates:  No Known Allergies    OBJECTIVE:     Vital Signs (Most Recent)  Temp: 98.5 °F (36.9 °C) (02/09/17 0500)  Pulse: 91 (02/09/17 0600)  Resp: 14 (02/09/17 0334)  BP: (!) 104/55 (02/08/17 1845)  SpO2: 98 % (02/09/17 0600)    Vital Signs Range (Last 24H):  Temp:  [96.1 °F (35.6 °C)-98.7 °F (37.1 °C)]   Pulse:  []   Resp:  [14-18]   BP: ()/(52-55)   SpO2:  [92 %-100 %]   Arterial Line BP: ()/(45-95)     I & O (Last 24H):  Intake/Output Summary (Last 24 hours) at 02/09/17 0641  Last data filed at 02/09/17 0545   Gross per 24 hour   Intake             6774 ml   Output             1381 ml   Net             5393 ml     Ventilator Data (Last 24H):     Vent Mode: A/C  Oxygen Concentration (%):  [] 70  Resp Rate Total:  [14 br/min-21 br/min] 14 br/min  Vt Set:  [600 mL] 600 mL  PEEP/CPAP:  [8 cmH20-10 cmH20] 8 cmH20  Pressure Support:  [0 cmH20-10 cmH20] 0 cmH20  Mean Airway Pressure:  [14 ttF98-08 cmH20] 14 cmH20    Hemodynamic Parameters (Last 24H):  PAP: (39-69)/(22-33) 58/26  PAP (Mean):  [31 mmHg-47 mmHg] 39 mmHg  CO:  [4.8 L/min-8.3 L/min] 8.3 L/min  CI:  [2.3 L/min/m2-3.9 L/min/m2] 3.9 L/min/m2    Physical Exam:  General: well developed, well nourished  HENT: Head:normocephalic, atraumatic.  Eyes:  conjunctivae/corneas clear. PERRL.   Neck: supple, symmetrical, trachea midline, no JVD and thyroid not enlarged, symmetric, no tenderness/mass/nodules  Lungs: clear to auscultation bilaterally and normal respiratory effort  Cardiovascular: Heart: tachycardic with regular rhythm, S1, S2 normal, no murmur, click, rub or gallop. Chest Wall: sternotomy dressing C/D/I. Extremities: no cyanosis or edema, or clubbing. Pulses: 2+ and symmetric.  Abdomen/Rectal: Abdomen: soft, non-tender non-distented; bowel sounds normal; no masses, no organomegaly.   Skin: Skin color, texture, turgor normal. No rashes or lesions    Wound/Incision:   clean, dry, intact    Lines/Drains:       Hemodialysis Catheter right subclavian (Active)   Site Assessment Clean;Dry;Intact;No redness;No swelling;No drainage;No warmth 2/9/2017  3:00 AM   Status Deaccessed 2/9/2017  3:00 AM   Flows Good 2/7/2017  7:47 AM   Dressing Intervention Dressing reinforced 2/6/2017  8:00 AM   Dressing Status Clean;Dry;Intact 2/9/2017  3:00 AM   Dressing Change Due 02/08/17 2/7/2017  8:00 PM   Site Condition No complications 2/9/2017  3:00 AM   Dressing Occlusive 2/9/2017  3:00 AM   Daily Line Review Performed 2/9/2017  3:00 AM   Number of days:            Introducer 02/08/17 left internal jugular (Active)   Specific Qualities [place] in place 2/9/2017  3:00 AM   Dressing Status Clean;Dry;Intact 2/9/2017  3:00 AM   Dressing Change Due 02/15/17 2/9/2017  3:00 AM   Daily Line Review Performed 2/9/2017  3:00 AM   Number of days:1            Pulmonary Artery Catheter Assessment  02/08/17 0820 (Active)   Dressing biopatch in place;dressing dry and intact 2/9/2017  3:00 AM   Securement secured w/ sutures 2/9/2017  3:00 AM   Current Insertion Depth (cm) 49 cm 2/9/2017  3:00 AM   Phlebitis 0-->no symptoms 2/9/2017  3:00 AM   Infiltration 0-->no symptoms 2/9/2017  3:00 AM   Waveform normal 2/9/2017  3:00 AM   Prox Injectate Status Infusing 2/9/2017  3:00 AM   Prox Infusate Status  Infusing 2/9/2017  3:00 AM   Distal Status Infusing 2/9/2017  3:00 AM   Daily Line Review Performed 2/9/2017  3:00 AM   Number of days:0            CVC Triple Lumen - PreSep Cath 01/24/17 right internal jugular (Active)   Site Assessment Clean;Dry;Intact;No redness;No swelling;No drainage;No warmth 2/9/2017  3:00 AM   Proximal Lumen Status Infusing 2/9/2017  3:00 AM   Medial Lumen Status Infusing 2/9/2017  3:00 AM   Distal Lumen Status Infusing 2/9/2017  3:00 AM   Dressing Type Transparent 2/9/2017  3:00 AM   Dressing Status Clean;Dry;Intact;Biopatch in place 2/9/2017  3:00 AM   Dressing Intervention New dressing 2/7/2017  7:47 AM   Dressing Change Due 02/13/17 2/9/2017  3:00 AM   Daily Line Review Performed 2/9/2017  3:00 AM   Number of days:16            Peripheral IV - Single Lumen 07/12/16 1535 Right Antecubital (Active)   Number of days:211            Peripheral IV - Single Lumen 02/07/17 1809 Left Forearm (Active)   Site Assessment Clean;Dry;Intact;No redness;No swelling;No warmth;No drainage 2/9/2017  3:00 AM   Line Status Flushed;Infusing 2/9/2017  3:00 AM   Dressing Status Clean;Dry;Intact 2/9/2017  3:00 AM   Dressing Intervention New dressing 2/7/2017  6:10 PM   Dressing Change Due 02/11/17 2/9/2017  3:00 AM   Site Change Due 02/11/17 2/8/2017  7:00 PM   Reason Not Rotated Not due 2/9/2017  3:00 AM   Number of days:1            Peripheral IV - Single Lumen 02/08/17 2243 Right Antecubital (Active)   Site Assessment Clean;Dry;Intact;No redness;No swelling;No drainage;No warmth 2/9/2017  3:00 AM   Line Status Blood return noted;Flushed;Infusing 2/9/2017  3:00 AM   Dressing Status Clean;Dry;Intact 2/9/2017  3:00 AM   Dressing Intervention New dressing 2/9/2017  3:00 AM   Dressing Change Due 02/12/17 2/9/2017  3:00 AM   Site Change Due 02/12/17 2/9/2017  3:00 AM   Reason Not Rotated Not due 2/9/2017  3:00 AM   Number of days:0            Arterial Line 02/08/17 0807 Left Radial (Active)   Site Assessment  Clean;Dry;Intact;No swelling;No redness;No warmth;No drainage 2/9/2017  3:00 AM   Line Status Pulsatile blood flow 2/9/2017  3:00 AM   Art Line Waveform Appropriate;Square wave test performed 2/9/2017  3:00 AM   Arterial Line Interventions Leveled;Flushed per protocol 2/9/2017  3:00 AM   Color/Movement/Sensation Capillary refill less than 3 sec 2/9/2017  3:00 AM   Dressing Type Transparent 2/9/2017  3:00 AM   Dressing Status Clean;Dry;Intact 2/9/2017  3:00 AM   Dressing Change Due 02/12/17 2/9/2017  3:00 AM   Number of days:0            Pacer Wires 02/08/17 1542 (Active)   Pacer Wire Status Ventricular wires disconnected from pacer 2/9/2017  3:00 AM   Site Assessment Clean;Dry;Intact 2/9/2017  3:00 AM   How Pacer Wires are Secured Ventricular wires secured to dressing 2/9/2017  3:00 AM   Dressing Status Clean;Dry;Intact 2/9/2017  3:00 AM   Number of days:0            NG/OG Tube 02/08/17 1100 (Active)   Placement Check placement verified by x-ray;placement verified by distal tube length measurement 2/9/2017  3:00 AM   pH Aspirate Result 5 2/8/2017  5:30 PM   Tolerance no signs/symptoms of discomfort 2/9/2017  3:00 AM   Securement taped to commercial device 2/9/2017  3:00 AM   Clamp Status/Tolerance unclamped 2/9/2017  3:00 AM   Suction Setting/Drainage Method suction initiated at;low;intermittent setting 2/9/2017  3:00 AM   Insertion Site Appearance no redness, warmth, tenderness, skin breakdown, drainage 2/9/2017  3:00 AM   Drainage Green 2/9/2017  3:00 AM   Flush/Irrigation flushed w/;water;no resistance met 2/9/2017  3:00 AM   Number of days:0            Urethral Catheter 02/08/17 0850 Non-latex;Straight-tip;Temperature probe 16 Fr. (Active)   Site Assessment Clean;Intact 2/9/2017  3:00 AM   Collection Container Urimeter 2/9/2017  3:00 AM   Securement Method secured to top of thigh w/ adhesive device 2/9/2017  3:00 AM   Catheter Care Performed yes 2/9/2017  3:00 AM   Reason for Continuing Urinary Catheterization  Post operative 2/9/2017  3:00 AM   CAUTI Prevention Bundle Drainage bag off the floor;No dependent loops or kinks 2/8/2017  5:30 PM   Output (mL) 3 mL 2/9/2017  5:45 AM   Number of days:0            Y Chest Tube 1 and 2 02/08/17 1552 Right Mediastinal 19 Fr. 2 Left Mediastinal 19 Fr. (Active)   Function -20 cm H2O 2/9/2017  3:00 AM   Air Leak/Fluctuation air leak present 2/9/2017  3:00 AM   Safety all connections secured 2/9/2017  3:00 AM   Securement tubing anchored to body distal to insertion site w/ tape 2/9/2017  3:00 AM   Left Subcutaneous Emphysema none present 2/9/2017  3:00 AM   Right Subcutaneous Emphysema none present 2/9/2017  3:00 AM   Patency Intervention Stripped;Tip/tilt 2/9/2017  3:00 AM   Drainage Description 1 Serosanguineous 2/9/2017  3:00 AM   Tube 1 Dressing Appearance occlusive gauze dressing intact;clean and dry 2/9/2017  3:00 AM   Site Assessment 1 Not assessed 2/9/2017  3:00 AM   Surrounding Skin 1 Unable to view 2/9/2017  3:00 AM   Drainage Description 2 Sanguineous 2/9/2017  3:00 AM   Tube 2 Dressing Appearance occlusive gauze dressing intact;clean and dry 2/9/2017  3:00 AM   Site Assessment 2 Not assessed 2/9/2017  3:00 AM   Surrounding Skin Unable to view 2/9/2017  3:00 AM   Output (mL) 6 mL 2/9/2017  5:00 AM   Number of days:0            Y Chest Tube 3 and 4 02/08/17 1553 3 Right Pleural 19 Fr. 4 Left Pleural 19 Fr. (Active)   Function -20 cm H2O 2/9/2017  3:00 AM   Air Leak/Fluctuation air leak not present 2/9/2017  3:00 AM   Safety all tubing connections taped 2/9/2017  3:00 AM   Securement tubing anchored to body distal to insertion site w/ tape 2/9/2017  3:00 AM   Left Subcutaneous Emphysema none present 2/9/2017  3:00 AM   Right Subcutaneous Emphysema none present 2/9/2017  3:00 AM   Patency Intervention Stripped;Tip/tilt 2/9/2017  3:00 AM   Drainage Description 3 Serosanguineous 2/9/2017  3:00 AM   Tube 3 Dressing Appearance occlusive gauze dressing intact;clean and dry 2/9/2017   3:00 AM   Site Assessment 3 Not assessed 2/9/2017  3:00 AM   Surrounding Skin 3 Unable to view 2/9/2017  3:00 AM   Drainage Description 4 Sanguineous 2/9/2017  3:00 AM   Tube 4 Dressing Appearance occlusive gauze dressing intact;clean and dry 2/9/2017  3:00 AM   Site Assessment 4 Not assessed 2/9/2017  3:00 AM   Surrounding Skin 4 Unable to view 2/9/2017  3:00 AM   Output (mL) 10 mL 2/9/2017  5:00 AM   Number of days:0       Laboratory (Last 24H):  CBC:    Recent Labs  Lab 02/09/17  0300   WBC 12.11   HGB 8.1*   HCT 24.4*   *     CMP:    Recent Labs  Lab 02/08/17  1714  02/09/17  0300   CALCIUM 7.6*  < > 7.6*   ALBUMIN 2.2*  --   --    PROT 5.1*  --   --      < > 141   K 5.0  < > 5.2*   CO2 21*  < > 23   *  < > 111*   BUN 40*  < > 47*   CREATININE 3.9*  < > 4.7*   ALKPHOS 53*  --   --    ALT 37  --   --    AST 51*  --   --    BILITOT 1.3*  --   --    < > = values in this interval not displayed.    Chest X-Ray: Aortic valve replacement. Right IJ central line terminates over the right atrium. Second IJ central line terminates over the SVC [place] catheter terminates over the pulmonary outflow. Mediastinal drain in place. Bilateral chest tubes.No focal consolidations. There is no pleural effusion or pneumothorax. The cardiac silhouette isenlarged in size.  There are no acute bony abnormalities.    ASSESSMENT/PLAN:         Neuro:   Sedated with propofol and fentanyl   - RASS -1      Pulmonary:   Intubated with high vent settings  - nitric oxide 35ppm  - PA pressures elevated, initially 30s/teens but climbed to 50s systolic overnight  -? Could add inhaled prostaglandin     Cardiac:  S/p AVR   CHF   Remains pressor dependent but weaning/improving with blood administration     Renal:   Requiring RRT,    held overnight as patient was unstable   - increasing Cr with mild hyperkalemia, pt may require CRRT today   - metabolic acidosis   - lactate 3.6      Infectious Disease:   Active Hep B   Ancef per  primary for post Op prophylaxis      Hematology/Oncology:  WBC WNL  H/H trended down 8.9/26.4 --> 7.6/22.7    2 U PRBC given overnight with mild improvement 8.1/24.4      Endocrine:  No issues currently, not on insulin gtt   Glu 111      Fluids/Electrolytes/Nutrition/GI:   NPO currently   Replete electrolytes prn being mindful of renal function   - mildly hyperkalemic      Pain Management:   Fentanyl gtt   Dilaudid 1mg q4h prn         Aishwarya Elam MD  [facility]   Anesthesiology

## 2017-02-09 NOTE — PROGRESS NOTES
Dr. Carroll at bedside.  1 amp bicarb given.  Will increase Primacor to 0.75mcg/kg/min.  SVo2 still remains 29-30%.  Will continue to watch pt very closely and possibly give fluid if numbers do not increase.  Blood pressure is stable on current drips with MAP 65-66.  CVP 17-18.  MDs at bedside.

## 2017-02-09 NOTE — ASSESSMENT & PLAN NOTE
Avoid hypoglycemia  Estimated Creatinine Clearance: 22.8 mL/min (based on Cr of 4.7).   Nephrology following

## 2017-02-09 NOTE — PROGRESS NOTES
MD Ferrera notified of renal panel. MD to place orders for CRRT for later today with no fluid removal. Will continue to monitor.

## 2017-02-09 NOTE — PROGRESS NOTES
Notified Dr. Us of MAP 57-59.  First 250ml albumin administered and pt BP came up for a while but now decreased.  Will administer another 250ml albumin and continue to monitor closely.  Will increase levophed after given time for response to albumin.

## 2017-02-09 NOTE — SUBJECTIVE & OBJECTIVE
"Interval HPI:   Remains intubated with no plans for extubation today, minimal insulin requirements, requiring pressors for hemodynamic support,  No hypoglycemia, NPO     PMH, PSH, FH, SH updated and reviewed     ROS:  Unable to obtain due to: Sedation,Intubation,Altered mental status,Critical illness,Reviewed ROS from note dated h & p     Current Medications and/or Treatments Impacting Glycemic Control  Immunotherapy:    Immunosuppressants     None        Steroids:   Hormones     Start     Stop Route Frequency Ordered    02/08/17 1800  vasopressin (PITRESSIN) 100 Units in dextrose 5 % 100 mL infusion      -- IV Continuous 02/08/17 1654        Pressors:    Autonomic Drugs     Start     Stop Route Frequency Ordered    02/08/17 1800  epinephrine 4 mg in sodium chloride 0.9% 250 mL infusion     Question Answer Comment   Titrate by: (in mcg/kg/min) 0.05    Titrate interval: (in minutes) 5    Titrate to maintain: (SBP or MAP or Cardiac Index) CARDIAC INDEX    Cardiac index greater than: (in L/min) 2.2    Maximum dose: (in mcg/kg/min) 2        -- IV Continuous 02/08/17 1654 02/08/17 1800  norepinephrine 4 mg in dextrose 5% 250 mL infusion (premix) (titrating)     Question Answer Comment   Titrate by: (in mcg/kg/min) 0.05    Titrate interval: (in minutes) 5    Titrate to maintain: (MAP or SBP) MAP    Greater than: (in mmHg) 60    Maximum dose: (in mcg/kg/min) 3        -- IV Continuous 02/08/17 1654 02/08/17 1831  EPINEPHrine (ADRENALIN) 1 mg/mL injection     Comments:  Created by cabinet override    02/09 0644 02/08/17 1831        Hyperglycemia/Diabetes Medications:   Antihyperglycemics     Start     Stop Route Frequency Ordered    02/09/17 0919  insulin aspart pen 0-5 Units      -- SubQ Every 6 hours PRN 02/09/17 0819          PHYSICAL EXAMINATION:  Visit Vitals    BP (!) 104/55    Pulse 91    Temp 98.5 °F (36.9 °C) (Core)    Resp 14    Ht 6' 2" (1.88 m)    Wt 86.7 kg (191 lb 2.2 oz)    SpO2 100%    BMI " 24.54 kg/m2     Constitutional:  Well developed, well nourished, NAD.  ENT: External ears no masses with nose patent .   Neck:  Supple; trachea midline .   Cardiovascular: Normal heart sounds, no LE edema.     Lungs:  Normal effort; lungs anterior bilaterally clear to auscultation.  Abdomen:  Soft, no masses,  no hernias.  MS: No clubbing or cyanosis of nails noted;  unable to assess gait.  Skin: surgical dressing DI, No rashes .  Psychiatric: JENNIFER  Neurological: JENNIFER

## 2017-02-09 NOTE — CONSULTS
[facility]  Endocrinology  Diabetes Consult Note    Consult Requested by: Jose Gastelum MD   Reason for admit: Nonrheumatic aortic valve stenosis    HISTORY OF PRESENT ILLNESS:  Reason for Consult: Management of  Hyperglycemia     Surgical Procedure and Date: 2/8/17 1) REPLACEMENT-VALVE-AORTIC with a 22 mm Medtronic ATS open pivot mechanical prosthesis 47783, 2) TRICUSPID VALVE REPAIR with a 28 mm Medtronic Contour annuloplasty band 86853  3) REDO STERNOTOMY WITH AORTIC VALVE REPLACEMENT/redo sternotomy 79358    HPI: Patient is a 47 y.o. male with ASD repair at age 8, AS with CHF, pulmonary HTN, CKDIII, active hep B transferred from [facility] for surgery eval.  Patient now aortic valve replacement . Endocrine consulted for bg management.       Interval HPI:   Remains intubated with no plans for extubation today, minimal insulin requirements, requiring pressors for hemodynamic support,  No hypoglycemia, NPO     PMH, PSH, FH, SH updated and reviewed     ROS:  Unable to obtain due to: Sedation,Intubation,Altered mental status,Critical illness,Reviewed ROS from note dated h & p     Current Medications and/or Treatments Impacting Glycemic Control  Immunotherapy:    Immunosuppressants     None        Steroids:   Hormones     Start     Stop Route Frequency Ordered    02/08/17 1800  vasopressin (PITRESSIN) 100 Units in dextrose 5 % 100 mL infusion      -- IV Continuous 02/08/17 1654        Pressors:    Autonomic Drugs     Start     Stop Route Frequency Ordered    02/08/17 1800  epinephrine 4 mg in sodium chloride 0.9% 250 mL infusion     Question Answer Comment   Titrate by: (in mcg/kg/min) 0.05    Titrate interval: (in minutes) 5    Titrate to maintain: (SBP or MAP or Cardiac Index) CARDIAC INDEX    Cardiac index greater than: (in L/min) 2.2    Maximum dose: (in mcg/kg/min) 2        -- IV Continuous 02/08/17 1654 02/08/17 1800  norepinephrine 4 mg in dextrose 5% 250 mL infusion  "(premix) (titrating)     Question Answer Comment   Titrate by: (in mcg/kg/min) 0.05    Titrate interval: (in minutes) 5    Titrate to maintain: (MAP or SBP) MAP    Greater than: (in mmHg) 60    Maximum dose: (in mcg/kg/min) 3        -- IV Continuous 02/08/17 1654    02/08/17 1831  EPINEPHrine (ADRENALIN) 1 mg/mL injection     Comments:  Created by cabinet override    02/09 0644 02/08/17 1831        Hyperglycemia/Diabetes Medications:   Antihyperglycemics     Start     Stop Route Frequency Ordered    02/09/17 0919  insulin aspart pen 0-5 Units      -- SubQ Every 6 hours PRN 02/09/17 0819          PHYSICAL EXAMINATION:  Visit Vitals    BP (!) 104/55    Pulse 91    Temp 98.5 °F (36.9 °C) (Core)    Resp 14    Ht 6' 2" (1.88 m)    Wt 86.7 kg (191 lb 2.2 oz)    SpO2 100%    BMI 24.54 kg/m2     Constitutional:  Well developed, well nourished, NAD.  ENT: External ears no masses with nose patent .   Neck:  Supple; trachea midline .   Cardiovascular: Normal heart sounds, no LE edema.     Lungs:  Normal effort; lungs anterior bilaterally clear to auscultation.  Abdomen:  Soft, no masses,  no hernias.  MS: No clubbing or cyanosis of nails noted;  unable to assess gait.  Skin: surgical dressing DI, No rashes .  Psychiatric: JENNIFER  Neurological: JENNIFER      Labs Reviewed and Include     Recent Labs  Lab 02/08/17  1714  02/09/17  0300   *  < > 151*   CALCIUM 7.6*  < > 7.6*   ALBUMIN 2.2*  --   --    PROT 5.1*  --   --      < > 141   K 5.0  < > 5.2*   CO2 21*  < > 23   *  < > 111*   BUN 40*  < > 47*   CREATININE 3.9*  < > 4.7*   ALKPHOS 53*  --   --    ALT 37  --   --    AST 51*  --   --    BILITOT 1.3*  --   --    < > = values in this interval not displayed.  Lab Results   Component Value Date    WBC 12.11 02/09/2017    HGB 8.1 (L) 02/09/2017    HCT 24.4 (L) 02/09/2017    MCV 91 02/09/2017     (L) 02/09/2017     No results for input(s): TSH, FREET4 in the last 168 hours.  No results found for: " HGBA1C    Nutritional status:   Body mass index is 24.54 kg/(m^2).  Lab Results   Component Value Date    ALBUMIN 2.2 (L) 02/08/2017    ALBUMIN 2.4 (L) 02/08/2017    ALBUMIN 2.5 (L) 02/06/2017     No results found for: PREALBUMIN    Estimated Creatinine Clearance: 22.8 mL/min (based on Cr of 4.7).    Accu-Checks  Recent Labs      02/09/17   0013  02/09/17   0107  02/09/17   0209  02/09/17   0309  02/09/17   0419  02/09/17   0500  02/09/17   0554  02/09/17   0723  02/09/17   0807  02/09/17   1118   POCTGLUCOSE  134*  146*  143*  164*  150*  169*  171*  165*  124*  171*        ASSESSMENT and PLAN    Hyperglycemia  bg goal 140-180 r/t CKD 5  D/c insulin gtt, started low dose correction with q6h bg monitoring    Discharge planning- anticipate resolution         CKD stage 5 secondary to hypertension  Avoid hypoglycemia  Estimated Creatinine Clearance: 22.8 mL/min (based on Cr of 4.7).   Nephrology following          Adult congenital heart disease  S/p AVR      S/P AVR (aortic valve replacement)    Per CTS    Postprocedural hypotension  Requiring vasopressors for hemodynamic stability  Vaso, levo gtt titrations per CTS      Plan discussed with RN at bedside.     Tg Fierro, DNP, NP  Endocrinology  [facility]

## 2017-02-09 NOTE — PROGRESS NOTES
Notified Dr. Us of pt PA pressures increased to the 50s/20s but with MAP stable at 60-64 and on the 2nd unit of blood.  SVO2 increased to 50% and CI increased to 3.6.  Levo weaned to 0.1mcg/kg/min.  Okay to wean Epi as tolerated.

## 2017-02-09 NOTE — PT/OT/SLP PROGRESS
Occupational Therapy      Isidro Sheriffbar  MRN: [id number]    Patient not seen today secondary to pt remains intubated and sedated. OT to check status next date.     DOMINICK Singh  2/9/2017

## 2017-02-09 NOTE — PROGRESS NOTES
ADMIT NOTE- pt admitted to [id number] from the OR. Brought vented on tele by anesthesia. CTS fellow at BS. Vitals stable. See flow sheet for assessment details. RN at BS to monitor.

## 2017-02-09 NOTE — ASSESSMENT & PLAN NOTE
bg goal 140-180 r/t CKD 5  D/c insulin gtt, started low dose correction with q6h bg monitoring    Discharge planning- anticipate resolution

## 2017-02-09 NOTE — PROGRESS NOTES
Progress Note  Nephrology    Admit Date: 2/2/2017   LOS: 7 days     SUBJECTIVE:     Follow-up For:  ESRD on HD     Interval follow up: Patient remains intubated. FiO2 90 % at the time of evaluation. CVP 10. Receiving blood products. On Vaso. Epi and norepinephrine.     OBJECTIVE:     Vital Signs (Most Recent)  Temp: 98.6 °F (37 °C) (02/09/17 0911)  Pulse: 92 (02/09/17 0917)  Resp: 14 (02/09/17 0334)  BP: (!) 104/55 (02/08/17 1845)  SpO2: 100 % (02/09/17 0917)    Vital Signs Range (Last 24H):  Temp:  [96.1 °F (35.6 °C)-98.7 °F (37.1 °C)]   Pulse:  []   Resp:  [14-18]   BP: ()/(52-55)   SpO2:  [92 %-100 %]   Arterial Line BP: ()/(45-95)       Intake/Output Summary (Last 24 hours) at 02/09/17 0925  Last data filed at 02/09/17 0900   Gross per 24 hour   Intake             6774 ml   Output             1446 ml   Net             5328 ml     Physical Exam:  General: Well developed, well nourished  HEENT: Conjunctiva clear; ETT in place   Neck: No JVD noted, Supple  Chest : chest tubes in place, surgical incision dressing c/d/i  CV- Normal S1, S2 with no murmurs,gallops,rubs  Resp- Lungs CTA Bilaterally, Unlabored  Abdomen- NTND, BS normoactive x4 quads, soft  Extrem- No cyanosis, clubbing, edema.  Skin- No rashes, lesions, ulcers  Neuro: sedated, no FND   Access: permcath     Laboratory:  CBC:     Recent Labs  Lab 02/09/17  0300   WBC 12.11   RBC 2.68*   HGB 8.1*   HCT 24.4*   *   MCV 91   MCH 30.2   MCHC 33.2     CMP:   Recent Labs  Lab 02/08/17  1714  02/09/17  0300   *  < > 151*   CALCIUM 7.6*  < > 7.6*   ALBUMIN 2.2*  --   --    PROT 5.1*  --   --      < > 141   K 5.0  < > 5.2*   CO2 21*  < > 23   *  < > 111*   BUN 40*  < > 47*   CREATININE 3.9*  < > 4.7*   ALKPHOS 53*  --   --    ALT 37  --   --    AST 51*  --   --    BILITOT 1.3*  --   --    < > = values in this interval not displayed.  ABGs:     Recent Labs  Lab 02/09/17  0749   PH 7.332*   PCO2 39.7   HCO3 21.1*    POCSATURATED 98   BE -5       Diagnostic Results:  Labs: Reviewed  X-Ray: Reviewed    ASSESSMENT/PLAN:     47 y.o. AAM with h/o ASD repair at 7 yo, aortic stenosis with CHF wirh rEF, pulmonary HTN, active hep B transferred from [facility] for surgery eval for AVR.     Now s/p redo sternotomy with  AVR and Tricuspid valve repair (2/8/2017)     CKD 5 progressed to ESRD on IHD MWF / TTS    - Pt hemodynamically unstable at this time requiring 3 pressors and high vent settings at 90 % FiO2 and 5 of PEEP  - Will mild hyperkalemia   - Will follow up laboratories and if stable will hold RRT. If worsening mild hyperkalemia will consider SLED for 8 hours  - Check renal panel Q8hrs.   - Please call on call fellow in case of abnormal labs   - Will continue monitoring    Patrick Ferrera   Nephrology fellow PGY- 5  [phone]

## 2017-02-09 NOTE — PROGRESS NOTES
"General Surgery Daily Progress Note    Isidro White  47 y.o.    Hospital Day: 7    Post Op Day: 1 Day Post-Op       Subjective  Patient remains critically ill overnight. Continues on high doses of four different pressors. Was able to wean somewhat overnight, however had to come back up on epi this morning 2/2 declining maps. Minimal uop (however pt HD dependent). CT minimal output. Continues on nitric.       Objective  Temp:  [96.1 °F (35.6 °C)-98.7 °F (37.1 °C)]   Pulse:  []   Resp:  [14-18]   BP: ()/(52-55)   SpO2:  [92 %-100 %]   Arterial Line BP: ()/(45-95)     Labs    Recent Labs  Lab 02/09/17  0300   WBC 12.11   RBC 2.68*   HGB 8.1*   HCT 24.4*   *   MCV 91   MCH 30.2   MCHC 33.2       Recent Labs  Lab 02/08/17  1714  02/09/17  0300   CALCIUM 7.6*  < > 7.6*   PROT 5.1*  --   --      < > 141   K 5.0  < > 5.2*   CO2 21*  < > 23   *  < > 111*   BUN 40*  < > 47*   CREATININE 3.9*  < > 4.7*   ALKPHOS 53*  --   --    ALT 37  --   --    AST 51*  --   --    BILITOT 1.3*  --   --    < > = values in this interval not displayed.    Recent Labs  Lab 02/09/17  0300   INR 1.3*   APTT 28.9       Visit Vitals    BP (!) 104/55    Pulse 92    Temp 98.5 °F (36.9 °C) (Core)    Resp 14    Ht 6' 2" (1.88 m)    Wt 86.7 kg (191 lb 2.2 oz)    SpO2 100%    BMI 24.54 kg/m2       General: Intubated and sedated  Head: Normocephalic, without obvious abnormality, atraumatic  Neck: Supple  Lungs: Intubated on high ventilatory settings, saturating well  Heart: On significant pressor support, regular rhythm   Abdomen: soft, NT/ND, normal bowel sounds  Extremities: normal, atraumatic, no edema  Neurologic: Sedated, but will wake up and follow commands     Imaging Results         X-Ray Chest AP Portable (In process)         X-Ray Chest AP Portable (Final result) Result time:  02/08/17 21:27:31    Final result by Jose Feliciano MD (02/08/17 21:27:31)    Impression:        Support devices " as above and recent TAVR without evidence of complication.      Electronically signed by: MERCEDES SUAREZ MD  Date:     02/08/17  Time:    21:27     Narrative:    PORTABLE AP CHEST:      Comparison: CT T./7/17    Findings:     Aortic valve replacement. Right IJ central line terminates over the right atrium. Second IJ central line terminates over the SVC [place] catheter terminates over the pulmonary outflow. Mediastinal drain in place. Bilateral chest tubes.No focal consolidations. There is no pleural effusion or pneumothorax. The cardiac silhouette isenlarged in size.  There are no acute bony abnormalities.            CT Chest Without Contrast (Final result) Result time:  02/08/17 10:15:53    Final result by Pranav Tai MD (02/08/17 10:15:53)    Impression:       1. Aorta maintains normal caliber, contour and course.  Calcification of the aortic valve is present.  There is also bulky calcification in the cardiac fibrous skeleton at the junction of aortic and mitral bowels.  LEFT ventricle and RIGHT atrium appear enlarged but detail is limited on this study performed without intravenous contrast medium and without gating.    2. Retrosternal structures were reviewed in this patient with planned repeat sternotomy.      - There are seven sternal sutures, the first three at the level of the manubrium.      - There is minimal fat  the posterior table of the manubrium from the anterior margin of the LEFT innominate vein.      - At least 1 cm of fat separates the sternomanubrial junction from the anterior wall of the distal ascending aorta.  The aorta remains well removed from the posterior table of the sternum inferiorly.      - However the anterior wall of the RIGHT ventricle is closely opposed to the posterior table of the sternum between the fourth and fifth sternal sutures extending inferiorly past the seventh sternal suture.  This finding is especially well seen on sagittal series 602 image  39.    3. Solitary 0.3 cm pulmonary nodule within the posterior basal aspect of the left lower lobe (axial series 2, image 32). Per Fleischner Society guidelines; in a low risk patient, no follow up is needed. In a high risk patient (e.g. history of smoking or malignancy), those guidelines recommend 12 month CT chest follow up to assess for stability 2/2018.    4. Dilated hepatic veins suggest elevated RIGHT heart pressures, tricuspid insufficiency or both.    5. Moderate volume abdominal ascites which is incompletely visualized.  Etiology of the intraperitoneal fluid is unclear aerated may be related to elevated RIGHT heart pressures.  ______________________________________     Electronically signed by resident: MINERVA LLOYD MD  Date:     02/08/17  Time:    09:33            As the supervising and teaching physician, I personally reviewed the images and resident's interpretation and I agree with the findings.          Electronically signed by: Sophia Elias MD  Date:     02/08/17  Time:    10:15     Narrative:    Time of Procedure: 02/07/17 19:35:34  Accession # [id number]    Reason for exam: Preoperative aortic valve replacement, evaluate aorta    Comparison: Chest radiograph 2/3/2017, 0849 hrs.    Technique:   The chest was surveyed from the apices through the costophrenic angles.  Data was reconstructed at 5-mm increments for contiguous 5-mm images in the axial, sagittal and coronal planes and post processed for extraction of 1.25-mm images at 10-mm increments and for 8 mm maximal-intensity (MIP) images at 2 mm increments confined to the axial plane.  No additional radiation was employed.      Findings:   Devices:  1.  Midline sternotomy wires appear intact with sternal fragments well opposed.  These wires are small and slender indicating sternotomy in childhood.  2. RIGHT IJ central venous catheter and RIGHT subclavian trialysis catheter terminate in the cephalad portion of the RIGHT atrium.      Retrosternal  structures were reviewed in this patient with planned repeat sternotomy.      - There are seven sternal sutures, the first three at the level of the manubrium.      - There is minimal fat  the posterior table of the manubrium from the anterior margin of the LEFT innominate vein.      - At least 1 cm of fat separates the sternomanubrial junction from the anterior wall of the distal ascending aorta.  The aorta remains well removed from the posterior table of the sternum inferiorly.      - However the anterior wall of the RIGHT ventricle including RVOT is closely opposed to the posterior table of the sternum.  The cephalad aspect of this close apposition lies between the fourth and fifth sternal sutures at the RVOT.  The RV remains closely applied to the posterior sternal table inferiorly past the seventh  and most distal sternal suture.  This finding is especially well seen on sagittal series 602 image 39.      There is no convincing evidence of abnormality at the base of the neck.      Blood pool is lower attenuation than the myocardium (32 Hounsfield units versus 41 Hounsfield units) consistent with anemia.  Although no intravenous contrast medium or cardiac gating was used we suspect enlargement of the LEFT ventricle and RIGHT atrium.  There is no pleural or pericardial fluid and no pleural or pericardial calcification.    There is left-sided arch with 3 branch vessels.      There is radiopaque material in semicircular distribution at the anterior and lateral wall of the very proximal ascending aorta or aortic valve annulus (axial series 2 images 25 through 27). Calcification is present in the aortic valve.  There is also a focus of bulky calcification in the fibrous skeleton of the heart at the junction of aortic and mitral valves (axial series 2 images 30 and 31).    We detect no calcific atherosclerosis in the aorta, coronary arteries or other aortic branches.    Measurements of the aorta are limited  by the absence of intravenous contrast medium and by transmitted cardiac motion.  Aortic measurements on this noncontrast  study are as follows:    - Mid AAo = 3.8 cm   (axial series 2 image 20); this is less well seen on coronal images because of artifact associated with the catheter in the SVC.    - Distal AAo = 3.7 cm   (sagittal series 602 image 37)    - Aortic arch = 3.7 cm (axial series 2 image 14).    - Isthmus = 3.1 cm   (sagittal series 602 image 44)    - Prox D'g Ao = 3.0 cm   (sagittal series 602 image 41)    - Distal D'g Ao = 3.0 cm   (sagittal series 602 image 39)    Pulmonary arteries have normal caliber, contour and distribution.  There are four pulmonary veins.  Systemic and pulmonary veno atrial connections are concordant.      There is no lymph node enlargement.    Esophagus maintains normal caliber and course.  The hepatic veins are dilated as might occur with tricuspid insufficiency, elevated right heart pressure causing elevated systemic venous pressure, or both. There is significant volume of ascites incompletely visualized.  There is no bowel distension, free air, biliary dilatation or other significant abnormality involving structures in the upper abdomen.      Osseous structures demonstrate age-appropriate degenerative changes with no fracture or lytic or blastic lesion.  As noted above the patient has undergone remote sternotomy, likely in childhood.  Sternal fragments are united.  Seven sternal wires, three at the manubrium, are intact.    Extrathoracic soft tissues are unremarkable in this slender individual.     Tracheobronchial tree reveals no significant abnormality.    The lungs are symmetrically expanded. There is a 0.3 cm solitary pulmonary nodule within the posterior basal segment of the left lower lobe (axial series 2, image 32).  If the patient has a history of smoking or other significant factor, the Fleischner Society guidelines recommend surveillance with repeat noncontrast  chest CT at twelve months (2/2018).  In the absence of such increased risk, those guidelines recommend no further assessment.    Lungs are otherwise clear.            X-Ray Chest 1 View (Final result) Result time:  02/03/17 09:30:20    Final result by Haja Maldonado Jr., MD (02/03/17 09:30:20)    Narrative:    Chest single view compared to July 2016.  Right-sided dialysis catheter overlies the SVC.  There is also a right central venous catheter in place.  Heart is enlarged.  Lungs are fairly well aerated.  No pneumothorax.    Impression see above      Electronically signed by: HAJA MALDONADO MD  Date:     02/03/17  Time:    09:30                 Assessment/Plan  47 y.o.yo male s/p REPLACEMENT-VALVE-AORTIC (N/A), REDO STERNOTOMY WITH AORTIC VALVE REPLACEMENT/redo sternotomy (N/A), REPAIR-VALVE-TRICUSPID on 2/8/17    Neuro: sedated with propofol, pain control with fentanyl gtt, wakes up and follows commands  - continue sedation and pain control as is until more stable    CV: s/p aortic valve replacement and tricuspid valve repair, on multiple pressors, borderline stable at the moment  - continue milrinone, epi, norepi, and vaso for MAP >60  - continue nitric for elevated PA pressures  - wean as tolerated    Pulm: On high vent settings currently, elevated PA pressures, oxygenating appropriately (low for high settings however)    Recent Labs  Lab 02/09/17  0444   PH 7.300*   PCO2 30.0*   PO2 37*   HCO3 14.8*   POCSATURATED 66*   BE -12   -  Wean vent as tolerated  - Continue nitric    Renal: Minimal uop, pt is HD dependent (ESRD)  Recent Labs      02/09/17   0300   BUN  47*   CREATININE  4.7*   - CVP 11 (17 overnight), no evidence of volume overload, no significant electrolyte abnormalities  - Nephrology following, given pt's instability and no acute indications for HD/CRRT, will likely hold off on RRT for now     Hem/ID: afebrile, no leukocytosis, h/h stable  Recent Labs      02/09/17   0300   HGB  8.1*   HCT  24.4*    WBC  12.11   - trend labs    Endocrine: euglycemic off of insulin gtt    FEN/GI: npo  - no significant electrolyte abnormalities   - trend labs    Dispo: Continue icu care      Jose Viramontes MD PGY II  [phone]

## 2017-02-09 NOTE — PT/OT/SLP PROGRESS
Physical Therapy      Isidro Lozano  White  MRN: [id number]    Patient not seen today secondary to  (AM and PM. pt on hold 2nd to being intubated and sedated. ). Will follow-up at a later date. .    Anamaria Dow, PT

## 2017-02-10 LAB
ALBUMIN SERPL BCP-MCNC: 2.7 G/DL
ALLENS TEST: ABNORMAL
ANION GAP SERPL CALC-SCNC: 11 MMOL/L
ANION GAP SERPL CALC-SCNC: 12 MMOL/L
ANION GAP SERPL CALC-SCNC: 14 MMOL/L
ANION GAP SERPL CALC-SCNC: 8 MMOL/L
APTT BLDCRRT: 47 SEC
APTT BLDCRRT: 47 SEC
APTT BLDCRRT: 50.7 SEC
APTT BLDCRRT: 63.1 SEC
APTT BLDCRRT: 88.1 SEC
BACTERIA NPH AEROBE CULT: NORMAL
BASOPHILS # BLD AUTO: 0.01 K/UL
BASOPHILS NFR BLD: 0.1 %
BUN SERPL-MCNC: 28 MG/DL
BUN SERPL-MCNC: 59 MG/DL
BUN SERPL-MCNC: 62 MG/DL
BUN SERPL-MCNC: 62 MG/DL
CALCIUM SERPL-MCNC: 8.3 MG/DL
CALCIUM SERPL-MCNC: 8.6 MG/DL
CALCIUM SERPL-MCNC: 8.6 MG/DL
CALCIUM SERPL-MCNC: 8.7 MG/DL
CHLORIDE SERPL-SCNC: 106 MMOL/L
CHLORIDE SERPL-SCNC: 110 MMOL/L
CO2 SERPL-SCNC: 18 MMOL/L
CO2 SERPL-SCNC: 20 MMOL/L
CO2 SERPL-SCNC: 20 MMOL/L
CO2 SERPL-SCNC: 24 MMOL/L
CREAT SERPL-MCNC: 3.3 MG/DL
CREAT SERPL-MCNC: 6.1 MG/DL
CREAT SERPL-MCNC: 6.5 MG/DL
CREAT SERPL-MCNC: 6.7 MG/DL
DELSYS: ABNORMAL
DIFFERENTIAL METHOD: ABNORMAL
EOSINOPHIL # BLD AUTO: 0.5 K/UL
EOSINOPHIL NFR BLD: 5.3 %
ERYTHROCYTE [DISTWIDTH] IN BLOOD BY AUTOMATED COUNT: 17.2 %
ERYTHROCYTE [SEDIMENTATION RATE] IN BLOOD BY WESTERGREN METHOD: 14 MM/H
ERYTHROCYTE [SEDIMENTATION RATE] IN BLOOD BY WESTERGREN METHOD: 16 MM/H
EST. GFR  (AFRICAN AMERICAN): 10.4 ML/MIN/1.73 M^2
EST. GFR  (AFRICAN AMERICAN): 10.8 ML/MIN/1.73 M^2
EST. GFR  (AFRICAN AMERICAN): 11.7 ML/MIN/1.73 M^2
EST. GFR  (AFRICAN AMERICAN): 24.5 ML/MIN/1.73 M^2
EST. GFR  (NON AFRICAN AMERICAN): 10.1 ML/MIN/1.73 M^2
EST. GFR  (NON AFRICAN AMERICAN): 21.2 ML/MIN/1.73 M^2
EST. GFR  (NON AFRICAN AMERICAN): 9 ML/MIN/1.73 M^2
EST. GFR  (NON AFRICAN AMERICAN): 9.3 ML/MIN/1.73 M^2
FIBRINOGEN PPP-MCNC: 360 MG/DL
FIO2: 40
FIO2: 50
FIO2: 50
GLUCOSE SERPL-MCNC: 100 MG/DL
GLUCOSE SERPL-MCNC: 103 MG/DL
GLUCOSE SERPL-MCNC: 104 MG/DL
GLUCOSE SERPL-MCNC: 105 MG/DL
HCO3 UR-SCNC: 18.8 MMOL/L (ref 24–28)
HCO3 UR-SCNC: 19.1 MMOL/L (ref 24–28)
HCO3 UR-SCNC: 19.8 MMOL/L (ref 24–28)
HCO3 UR-SCNC: 20.3 MMOL/L (ref 24–28)
HCO3 UR-SCNC: 21.6 MMOL/L (ref 24–28)
HCT VFR BLD AUTO: 23.7 %
HGB BLD-MCNC: 8 G/DL
HGB BLD-MCNC: 8 G/DL
INR PPP: 1.1
LYMPHOCYTES # BLD AUTO: 0.5 K/UL
LYMPHOCYTES NFR BLD: 5.1 %
MAGNESIUM SERPL-MCNC: 1.9 MG/DL
MAGNESIUM SERPL-MCNC: 2.3 MG/DL
MAGNESIUM SERPL-MCNC: 2.5 MG/DL
MCH RBC QN AUTO: 29.9 PG
MCHC RBC AUTO-ENTMCNC: 33.8 %
MCV RBC AUTO: 88 FL
METHEMOGLOBIN: 1.4 % (ref 0–3)
MIN VOL: 10.7
MIN VOL: 19.6
MIN VOL: 8.82
MIN VOL: 9.6
MIN VOL: 9.81
MODE: ABNORMAL
MONOCYTES # BLD AUTO: 0.9 K/UL
MONOCYTES NFR BLD: 9.7 %
NEUTROPHILS # BLD AUTO: 7.7 K/UL
NEUTROPHILS NFR BLD: 79.6 %
PCO2 BLDA: 28.3 MMHG (ref 35–45)
PCO2 BLDA: 32.9 MMHG (ref 35–45)
PCO2 BLDA: 34.4 MMHG (ref 35–45)
PCO2 BLDA: 35.1 MMHG (ref 35–45)
PCO2 BLDA: 36 MMHG (ref 35–45)
PEEP: 5
PH SMN: 7.34 [PH] (ref 7.35–7.45)
PH SMN: 7.37 [PH] (ref 7.35–7.45)
PH SMN: 7.39 [PH] (ref 7.35–7.45)
PH SMN: 7.4 [PH] (ref 7.35–7.45)
PH SMN: 7.43 [PH] (ref 7.35–7.45)
PHOSPHATE SERPL-MCNC: 3.7 MG/DL
PHOSPHATE SERPL-MCNC: 5.4 MG/DL
PHOSPHATE SERPL-MCNC: 5.6 MG/DL
PHOSPHATE SERPL-MCNC: 5.9 MG/DL
PIP: 27
PLATELET # BLD AUTO: 87 K/UL
PMV BLD AUTO: 11.2 FL
PO2 BLDA: 124 MMHG (ref 80–100)
PO2 BLDA: 135 MMHG (ref 80–100)
PO2 BLDA: 140 MMHG (ref 80–100)
PO2 BLDA: 34 MMHG (ref 40–60)
PO2 BLDA: 80 MMHG (ref 80–100)
POC BE: -3 MMOL/L
POC BE: -5 MMOL/L
POC BE: -5 MMOL/L
POC BE: -6 MMOL/L
POC BE: -7 MMOL/L
POC SATURATED O2: 66 % (ref 95–100)
POC SATURATED O2: 95 % (ref 95–100)
POC SATURATED O2: 99 % (ref 95–100)
POC TCO2: 20 MMOL/L (ref 23–27)
POC TCO2: 20 MMOL/L (ref 23–27)
POC TCO2: 21 MMOL/L (ref 23–27)
POC TCO2: 21 MMOL/L (ref 24–29)
POC TCO2: 23 MMOL/L (ref 23–27)
POCT GLUCOSE: 103 MG/DL (ref 70–110)
POCT GLUCOSE: 104 MG/DL (ref 70–110)
POCT GLUCOSE: 122 MG/DL (ref 70–110)
POTASSIUM SERPL-SCNC: 4.5 MMOL/L
POTASSIUM SERPL-SCNC: 4.6 MMOL/L
POTASSIUM SERPL-SCNC: 4.6 MMOL/L
POTASSIUM SERPL-SCNC: 4.9 MMOL/L
PROTHROMBIN TIME: 11.9 SEC
PS: 5
RBC # BLD AUTO: 2.68 M/UL
SAMPLE: ABNORMAL
SITE: ABNORMAL
SODIUM SERPL-SCNC: 138 MMOL/L
SODIUM SERPL-SCNC: 141 MMOL/L
SODIUM SERPL-SCNC: 142 MMOL/L
SODIUM SERPL-SCNC: 142 MMOL/L
SP02: 100
SP02: 96
SP02: 98
SPONT RATE: 21
VT: 600
WBC # BLD AUTO: 9.69 K/UL

## 2017-02-10 PROCEDURE — 25000003 PHARM REV CODE 250: Performed by: INTERNAL MEDICINE

## 2017-02-10 PROCEDURE — 25000003 PHARM REV CODE 250: Performed by: NURSE PRACTITIONER

## 2017-02-10 PROCEDURE — 63600175 PHARM REV CODE 636 W HCPCS: Performed by: HOSPITALIST

## 2017-02-10 PROCEDURE — 85730 THROMBOPLASTIN TIME PARTIAL: CPT | Mod: 91

## 2017-02-10 PROCEDURE — 37799 UNLISTED PX VASCULAR SURGERY: CPT

## 2017-02-10 PROCEDURE — 85610 PROTHROMBIN TIME: CPT

## 2017-02-10 PROCEDURE — 80069 RENAL FUNCTION PANEL: CPT | Mod: 91

## 2017-02-10 PROCEDURE — 36592 COLLECT BLOOD FROM PICC: CPT

## 2017-02-10 PROCEDURE — 84100 ASSAY OF PHOSPHORUS: CPT

## 2017-02-10 PROCEDURE — 82803 BLOOD GASES ANY COMBINATION: CPT

## 2017-02-10 PROCEDURE — 25000003 PHARM REV CODE 250: Performed by: STUDENT IN AN ORGANIZED HEALTH CARE EDUCATION/TRAINING PROGRAM

## 2017-02-10 PROCEDURE — 25000003 PHARM REV CODE 250: Performed by: HOSPITALIST

## 2017-02-10 PROCEDURE — 25000242 PHARM REV CODE 250 ALT 637 W/ HCPCS: Performed by: THORACIC SURGERY (CARDIOTHORACIC VASCULAR SURGERY)

## 2017-02-10 PROCEDURE — 27000221 HC OXYGEN, UP TO 24 HOURS

## 2017-02-10 PROCEDURE — 63600175 PHARM REV CODE 636 W HCPCS: Performed by: INTERNAL MEDICINE

## 2017-02-10 PROCEDURE — 90945 DIALYSIS ONE EVALUATION: CPT

## 2017-02-10 PROCEDURE — 83050 HGB METHEMOGLOBIN QUAN: CPT

## 2017-02-10 PROCEDURE — 80048 BASIC METABOLIC PNL TOTAL CA: CPT

## 2017-02-10 PROCEDURE — 63600175 PHARM REV CODE 636 W HCPCS: Performed by: THORACIC SURGERY (CARDIOTHORACIC VASCULAR SURGERY)

## 2017-02-10 PROCEDURE — 94003 VENT MGMT INPAT SUBQ DAY: CPT

## 2017-02-10 PROCEDURE — 99233 SBSQ HOSP IP/OBS HIGH 50: CPT | Mod: ,,, | Performed by: INTERNAL MEDICINE

## 2017-02-10 PROCEDURE — C9113 INJ PANTOPRAZOLE SODIUM, VIA: HCPCS | Performed by: THORACIC SURGERY (CARDIOTHORACIC VASCULAR SURGERY)

## 2017-02-10 PROCEDURE — 99233 SBSQ HOSP IP/OBS HIGH 50: CPT | Mod: ,,, | Performed by: SURGERY

## 2017-02-10 PROCEDURE — 94761 N-INVAS EAR/PLS OXIMETRY MLT: CPT

## 2017-02-10 PROCEDURE — 85384 FIBRINOGEN ACTIVITY: CPT

## 2017-02-10 PROCEDURE — 94799 UNLISTED PULMONARY SVC/PX: CPT

## 2017-02-10 PROCEDURE — 85025 COMPLETE CBC W/AUTO DIFF WBC: CPT

## 2017-02-10 PROCEDURE — 20000000 HC ICU ROOM

## 2017-02-10 PROCEDURE — 94640 AIRWAY INHALATION TREATMENT: CPT

## 2017-02-10 PROCEDURE — 25000003 PHARM REV CODE 250: Performed by: THORACIC SURGERY (CARDIOTHORACIC VASCULAR SURGERY)

## 2017-02-10 PROCEDURE — 85730 THROMBOPLASTIN TIME PARTIAL: CPT

## 2017-02-10 PROCEDURE — 99900035 HC TECH TIME PER 15 MIN (STAT)

## 2017-02-10 PROCEDURE — 94150 VITAL CAPACITY TEST: CPT

## 2017-02-10 PROCEDURE — 83735 ASSAY OF MAGNESIUM: CPT

## 2017-02-10 PROCEDURE — 80069 RENAL FUNCTION PANEL: CPT

## 2017-02-10 PROCEDURE — 83735 ASSAY OF MAGNESIUM: CPT | Mod: 91

## 2017-02-10 PROCEDURE — 63600175 PHARM REV CODE 636 W HCPCS: Performed by: STUDENT IN AN ORGANIZED HEALTH CARE EDUCATION/TRAINING PROGRAM

## 2017-02-10 PROCEDURE — 99231 SBSQ HOSP IP/OBS SF/LOW 25: CPT | Mod: ,,, | Performed by: NURSE PRACTITIONER

## 2017-02-10 RX ORDER — HYDROCODONE BITARTRATE AND ACETAMINOPHEN 7.5; 325 MG/15ML; MG/15ML
10 SOLUTION ORAL EVERY 4 HOURS PRN
Status: DISCONTINUED | OUTPATIENT
Start: 2017-02-10 | End: 2017-02-11

## 2017-02-10 RX ORDER — HYDROCODONE BITARTRATE AND ACETAMINOPHEN 7.5; 325 MG/15ML; MG/15ML
20 SOLUTION ORAL EVERY 4 HOURS PRN
Status: DISCONTINUED | OUTPATIENT
Start: 2017-02-10 | End: 2017-02-11

## 2017-02-10 RX ORDER — MORPHINE SULFATE 2 MG/ML
3 INJECTION, SOLUTION INTRAMUSCULAR; INTRAVENOUS
Status: DISCONTINUED | OUTPATIENT
Start: 2017-02-10 | End: 2017-02-14

## 2017-02-10 RX ORDER — SILDENAFIL CITRATE 20 MG/1
20 TABLET ORAL ONCE
Status: COMPLETED | OUTPATIENT
Start: 2017-02-10 | End: 2017-02-10

## 2017-02-10 RX ORDER — SILDENAFIL CITRATE 20 MG/1
20 TABLET ORAL 3 TIMES DAILY
Status: DISCONTINUED | OUTPATIENT
Start: 2017-02-10 | End: 2017-02-21 | Stop reason: HOSPADM

## 2017-02-10 RX ORDER — DEXMEDETOMIDINE HYDROCHLORIDE 4 UG/ML
0.2 INJECTION, SOLUTION INTRAVENOUS CONTINUOUS
Status: DISCONTINUED | OUTPATIENT
Start: 2017-02-10 | End: 2017-02-10

## 2017-02-10 RX ORDER — HYDROCODONE BITARTRATE AND ACETAMINOPHEN 500; 5 MG/1; MG/1
TABLET ORAL
Status: ACTIVE | OUTPATIENT
Start: 2017-02-10 | End: 2017-02-11

## 2017-02-10 RX ORDER — MAGNESIUM SULFATE HEPTAHYDRATE 40 MG/ML
2 INJECTION, SOLUTION INTRAVENOUS
Status: ACTIVE | OUTPATIENT
Start: 2017-02-10 | End: 2017-02-11

## 2017-02-10 RX ORDER — ASPIRIN 81 MG/1
81 TABLET ORAL DAILY
Status: DISCONTINUED | OUTPATIENT
Start: 2017-02-11 | End: 2017-02-21 | Stop reason: HOSPADM

## 2017-02-10 RX ADMIN — SODIUM CHLORIDE: 0.9 INJECTION, SOLUTION INTRAVENOUS at 03:02

## 2017-02-10 RX ADMIN — DEXMEDETOMIDINE HYDROCHLORIDE 0.4 MCG/KG/HR: 4 INJECTION, SOLUTION INTRAVENOUS at 08:02

## 2017-02-10 RX ADMIN — MORPHINE SULFATE 3 MG: 2 INJECTION, SOLUTION INTRAMUSCULAR; INTRAVENOUS at 10:02

## 2017-02-10 RX ADMIN — ASPIRIN 325 MG ORAL TABLET 325 MG: 325 PILL ORAL at 08:02

## 2017-02-10 RX ADMIN — MUPIROCIN 1 G: 20 OINTMENT TOPICAL at 08:02

## 2017-02-10 RX ADMIN — WARFARIN SODIUM 3 MG: 2 TABLET ORAL at 04:02

## 2017-02-10 RX ADMIN — OXYCODONE HYDROCHLORIDE AND ACETAMINOPHEN 500 MG: 500 TABLET ORAL at 08:02

## 2017-02-10 RX ADMIN — SILDENAFIL 20 MG: 20 TABLET ORAL at 04:02

## 2017-02-10 RX ADMIN — MORPHINE SULFATE 3 MG: 2 INJECTION, SOLUTION INTRAMUSCULAR; INTRAVENOUS at 09:02

## 2017-02-10 RX ADMIN — IPRATROPIUM BROMIDE AND ALBUTEROL SULFATE 3 ML: .5; 3 SOLUTION RESPIRATORY (INHALATION) at 03:02

## 2017-02-10 RX ADMIN — CEFAZOLIN SODIUM 2 G: 2 SOLUTION INTRAVENOUS at 06:02

## 2017-02-10 RX ADMIN — IPRATROPIUM BROMIDE AND ALBUTEROL SULFATE 3 ML: .5; 3 SOLUTION RESPIRATORY (INHALATION) at 07:02

## 2017-02-10 RX ADMIN — ERYTHROPOIETIN 10000 UNITS: 10000 INJECTION, SOLUTION INTRAVENOUS; SUBCUTANEOUS at 08:02

## 2017-02-10 RX ADMIN — NICARDIPINE HYDROCHLORIDE 7.5 MG/HR: 0.2 INJECTION, SOLUTION INTRAVENOUS at 11:02

## 2017-02-10 RX ADMIN — SODIUM FERRIC GLUCONATE COMPLEX 125 MG: 12.5 INJECTION INTRAVENOUS at 10:02

## 2017-02-10 RX ADMIN — POLYETHYLENE GLYCOL 3350 17 G: 17 POWDER, FOR SOLUTION ORAL at 08:02

## 2017-02-10 RX ADMIN — SILDENAFIL 20 MG: 20 TABLET ORAL at 09:02

## 2017-02-10 RX ADMIN — PANTOPRAZOLE SODIUM 40 MG: 40 INJECTION, POWDER, FOR SOLUTION INTRAVENOUS at 08:02

## 2017-02-10 RX ADMIN — Medication 2500 MCG: at 01:02

## 2017-02-10 RX ADMIN — IPRATROPIUM BROMIDE AND ALBUTEROL SULFATE 3 ML: .5; 3 SOLUTION RESPIRATORY (INHALATION) at 11:02

## 2017-02-10 RX ADMIN — PROPOFOL 25 MCG/KG/MIN: 10 INJECTION, EMULSION INTRAVENOUS at 05:02

## 2017-02-10 RX ADMIN — NICARDIPINE HYDROCHLORIDE 5 MG/HR: 0.2 INJECTION, SOLUTION INTRAVENOUS at 07:02

## 2017-02-10 RX ADMIN — MAGNESIUM SULFATE IN WATER 2 G: 40 INJECTION, SOLUTION INTRAVENOUS at 11:02

## 2017-02-10 RX ADMIN — NICARDIPINE HYDROCHLORIDE 7.5 MG/HR: 0.2 INJECTION, SOLUTION INTRAVENOUS at 06:02

## 2017-02-10 RX ADMIN — IPRATROPIUM BROMIDE AND ALBUTEROL SULFATE 3 ML: .5; 3 SOLUTION RESPIRATORY (INHALATION) at 08:02

## 2017-02-10 RX ADMIN — CHLORHEXIDINE GLUCONATE 15 ML: 1.2 RINSE ORAL at 09:02

## 2017-02-10 RX ADMIN — HEPARIN SODIUM AND DEXTROSE 1600 UNITS/HR: 10000; 5 INJECTION INTRAVENOUS at 09:02

## 2017-02-10 RX ADMIN — MORPHINE SULFATE 3 MG: 2 INJECTION, SOLUTION INTRAMUSCULAR; INTRAVENOUS at 04:02

## 2017-02-10 RX ADMIN — CHLORHEXIDINE GLUCONATE 15 ML: 1.2 RINSE ORAL at 08:02

## 2017-02-10 NOTE — PROGRESS NOTES
"General Surgery Daily Progress Note    Isidro White  47 y.o.    Hospital Day: 8    Post Op Day: 1 Day Post-Op       Subjective  Patient improved significantly. Off pressors. Weaned nitric off. On minimal sedation. Patient will wake up and follow commands. Reporting he is in pain.     Objective  Temp:  [97.8 °F (36.6 °C)-98.7 °F (37.1 °C)]   Pulse:  [82-95]   Resp:  [16-19]   BP: (103)/(59)   SpO2:  [95 %-100 %]   Arterial Line BP: ()/(48-71)     Labs    Recent Labs  Lab 02/10/17  0319   WBC 9.69   RBC 2.68*   HGB 8.0*  8.0*   HCT 23.7*   PLT 87*   MCV 88   MCH 29.9   MCHC 33.8       Recent Labs  Lab 02/10/17  0319   CALCIUM 8.3*      K 4.6   CO2 18*      BUN 59*   CREATININE 6.1*       Recent Labs  Lab 02/10/17  0556   INR 1.1   APTT 47.0*  47.0*       Visit Vitals    BP (!) 103/59 (BP Location: Right arm, Patient Position: Lying, BP Method: Automatic)    Pulse 87    Temp 98.7 °F (37.1 °C) (Oral)    Resp 16    Ht 6' 2" (1.88 m)    Wt 98.1 kg (216 lb 4.3 oz)    SpO2 95%    BMI 27.77 kg/m2       General: Intubated and sedated  Head: Normocephalic, without obvious abnormality, atraumatic  Neck: Supple  Lungs: Intubated on minimal ventilatory settings, saturating well  Heart: On significant pressor support, regular rhythm   Abdomen: soft, NT/ND, normal bowel sounds  Extremities: normal, atraumatic, no edema  Neurologic: Sedated, but will wake up and follow commands     Imaging Results         X-Ray Chest 1 View (Final result) Result time:  02/10/17 06:22:14    Final result by Marjan Tai MD (02/10/17 06:22:14)    Impression:      No significant interval detrimental change compared to most recent radiograph 2/9/2017    ______________________________________     Electronically signed by resident: MARJAN TAI MD  Date:     02/10/17  Time:    06:17            As the supervising and teaching physician, I personally reviewed the images and resident's interpretation and I agree " with the findings.          Electronically signed by: HARRY YANG  Date:     02/10/17  Time:    06:22     Narrative:    Time of Procedure: 02/10/17 05:41:22  Accession # [id number]    Comparison: Chest radiograph 02/09/2017    Number of views: 1    Findings:  Distal tip of endotracheal tube projects over the mid trachea 2.5 cm above the dillon.  LEFT IJ Manteo-Lenny catheter terminates over the expected location of the pulmonary outflow.  RIGHT IJ terminates over the cavoatrial junction.  RIGHT mediastinal drain in stable position.  Surgical clips project over the RIGHT lower mediastinum.  Postsurgical changes compatible with midline sternotomy and aortic valve replacement.  Cardiac silhouette is enlarged similar to prior.  Mediastinal structures are midline.  No large consolidation or pleural effusion.  No pneumothorax.            X-Ray Chest AP Portable (Final result) Result time:  02/09/17 08:13:05    Final result by Sophia Elias MD (02/09/17 08:13:05)    Impression:      Satisfactory postoperative chest radiograph with sternal sutures well aligned.       Electronically signed by: Sophia Elias MD  Date:     02/09/17  Time:    08:13     Narrative:    Time of Procedure: 01/25/12 07:09:00  Accession # [id number]    Comparison: 2/8/2017, 1909 hrs.    Number of views: 2.     Findings:  See below.            X-Ray Chest AP Portable (Final result) Result time:  02/08/17 21:27:31    Final result by Mercedes Feliciano MD (02/08/17 21:27:31)    Impression:        Support devices as above and recent TAVR without evidence of complication.      Electronically signed by: MERCEDES FELICIANO MD  Date:     02/08/17  Time:    21:27     Narrative:    PORTABLE AP CHEST:      Comparison: CT T./7/17    Findings:     Aortic valve replacement. Right IJ central line terminates over the right atrium. Second IJ central line terminates over the SVC Manteo-Lenny catheter terminates over the pulmonary outflow. Mediastinal drain in place. Bilateral  chest tubes.No focal consolidations. There is no pleural effusion or pneumothorax. The cardiac silhouette isenlarged in size.  There are no acute bony abnormalities.            CT Chest Without Contrast (Final result) Result time:  02/08/17 10:15:53    Final result by Pranav Tai MD (02/08/17 10:15:53)    Impression:       1. Aorta maintains normal caliber, contour and course.  Calcification of the aortic valve is present.  There is also bulky calcification in the cardiac fibrous skeleton at the junction of aortic and mitral bowels.  LEFT ventricle and RIGHT atrium appear enlarged but detail is limited on this study performed without intravenous contrast medium and without gating.    2. Retrosternal structures were reviewed in this patient with planned repeat sternotomy.      - There are seven sternal sutures, the first three at the level of the manubrium.      - There is minimal fat  the posterior table of the manubrium from the anterior margin of the LEFT innominate vein.      - At least 1 cm of fat separates the sternomanubrial junction from the anterior wall of the distal ascending aorta.  The aorta remains well removed from the posterior table of the sternum inferiorly.      - However the anterior wall of the RIGHT ventricle is closely opposed to the posterior table of the sternum between the fourth and fifth sternal sutures extending inferiorly past the seventh sternal suture.  This finding is especially well seen on sagittal series 602 image 39.    3. Solitary 0.3 cm pulmonary nodule within the posterior basal aspect of the left lower lobe (axial series 2, image 32). Per Fleischner Society guidelines; in a low risk patient, no follow up is needed. In a high risk patient (e.g. history of smoking or malignancy), those guidelines recommend 12 month CT chest follow up to assess for stability 2/2018.    4. Dilated hepatic veins suggest elevated RIGHT heart pressures, tricuspid insufficiency or  both.    5. Moderate volume abdominal ascites which is incompletely visualized.  Etiology of the intraperitoneal fluid is unclear aerated may be related to elevated RIGHT heart pressures.  ______________________________________     Electronically signed by resident: MINERVA LLOYD MD  Date:     02/08/17  Time:    09:33            As the supervising and teaching physician, I personally reviewed the images and resident's interpretation and I agree with the findings.          Electronically signed by: Sophia Elias MD  Date:     02/08/17  Time:    10:15     Narrative:    Time of Procedure: 02/07/17 19:35:34  Accession # [id number]    Reason for exam: Preoperative aortic valve replacement, evaluate aorta    Comparison: Chest radiograph 2/3/2017, 0849 hrs.    Technique:   The chest was surveyed from the apices through the costophrenic angles.  Data was reconstructed at 5-mm increments for contiguous 5-mm images in the axial, sagittal and coronal planes and post processed for extraction of 1.25-mm images at 10-mm increments and for 8 mm maximal-intensity (MIP) images at 2 mm increments confined to the axial plane.  No additional radiation was employed.      Findings:   Devices:  1.  Midline sternotomy wires appear intact with sternal fragments well opposed.  These wires are small and slender indicating sternotomy in childhood.  2. RIGHT IJ central venous catheter and RIGHT subclavian trialysis catheter terminate in the cephalad portion of the RIGHT atrium.      Retrosternal structures were reviewed in this patient with planned repeat sternotomy.      - There are seven sternal sutures, the first three at the level of the manubrium.      - There is minimal fat  the posterior table of the manubrium from the anterior margin of the LEFT innominate vein.      - At least 1 cm of fat separates the sternomanubrial junction from the anterior wall of the distal ascending aorta.  The aorta remains well removed from the  posterior table of the sternum inferiorly.      - However the anterior wall of the RIGHT ventricle including RVOT is closely opposed to the posterior table of the sternum.  The cephalad aspect of this close apposition lies between the fourth and fifth sternal sutures at the RVOT.  The RV remains closely applied to the posterior sternal table inferiorly past the seventh  and most distal sternal suture.  This finding is especially well seen on sagittal series 602 image 39.      There is no convincing evidence of abnormality at the base of the neck.      Blood pool is lower attenuation than the myocardium (32 Hounsfield units versus 41 Hounsfield units) consistent with anemia.  Although no intravenous contrast medium or cardiac gating was used we suspect enlargement of the LEFT ventricle and RIGHT atrium.  There is no pleural or pericardial fluid and no pleural or pericardial calcification.    There is left-sided arch with 3 branch vessels.      There is radiopaque material in semicircular distribution at the anterior and lateral wall of the very proximal ascending aorta or aortic valve annulus (axial series 2 images 25 through 27). Calcification is present in the aortic valve.  There is also a focus of bulky calcification in the fibrous skeleton of the heart at the junction of aortic and mitral valves (axial series 2 images 30 and 31).    We detect no calcific atherosclerosis in the aorta, coronary arteries or other aortic branches.    Measurements of the aorta are limited by the absence of intravenous contrast medium and by transmitted cardiac motion.  Aortic measurements on this noncontrast  study are as follows:    - Mid AAo = 3.8 cm   (axial series 2 image 20); this is less well seen on coronal images because of artifact associated with the catheter in the SVC.    - Distal AAo = 3.7 cm   (sagittal series 602 image 37)    - Aortic arch = 3.7 cm (axial series 2 image 14).    - Isthmus = 3.1 cm   (sagittal series 602  image 44)    - Prox D'g Ao = 3.0 cm   (sagittal series 602 image 41)    - Distal D'g Ao = 3.0 cm   (sagittal series 602 image 39)    Pulmonary arteries have normal caliber, contour and distribution.  There are four pulmonary veins.  Systemic and pulmonary veno atrial connections are concordant.      There is no lymph node enlargement.    Esophagus maintains normal caliber and course.  The hepatic veins are dilated as might occur with tricuspid insufficiency, elevated right heart pressure causing elevated systemic venous pressure, or both. There is significant volume of ascites incompletely visualized.  There is no bowel distension, free air, biliary dilatation or other significant abnormality involving structures in the upper abdomen.      Osseous structures demonstrate age-appropriate degenerative changes with no fracture or lytic or blastic lesion.  As noted above the patient has undergone remote sternotomy, likely in childhood.  Sternal fragments are united.  Seven sternal wires, three at the manubrium, are intact.    Extrathoracic soft tissues are unremarkable in this slender individual.     Tracheobronchial tree reveals no significant abnormality.    The lungs are symmetrically expanded. There is a 0.3 cm solitary pulmonary nodule within the posterior basal segment of the left lower lobe (axial series 2, image 32).  If the patient has a history of smoking or other significant factor, the Fleischner Society guidelines recommend surveillance with repeat noncontrast chest CT at twelve months (2/2018).  In the absence of such increased risk, those guidelines recommend no further assessment.    Lungs are otherwise clear.            X-Ray Chest 1 View (Final result) Result time:  02/03/17 09:30:20    Final result by Gerald Maldonado Jr., MD (02/03/17 09:30:20)    Narrative:    Chest single view compared to July 2016.  Right-sided dialysis catheter overlies the SVC.  There is also a right central venous catheter in  place.  Heart is enlarged.  Lungs are fairly well aerated.  No pneumothorax.    Impression see above      Electronically signed by: HAJA ONEAL MD  Date:     02/03/17  Time:    09:30                 Assessment/Plan  47 y.o.yo male s/p REPLACEMENT-VALVE-AORTIC (N/A), REDO STERNOTOMY WITH AORTIC VALVE REPLACEMENT/redo sternotomy (N/A), REPAIR-VALVE-TRICUSPID on 2/8/17    Neuro: sedated with propofol, pain control with fentanyl gtt, wakes up and follows commands  - wean propofol, start precedex  - switch from fentanyl gtt to prn pain meds  - attempt to wake up this am and keep pain controlled for more successful extubation     CV: s/p aortic valve replacement and tricuspid valve repair,   - continue milrinone, epi, norepi, and vaso for MAP >60  - continue nitric for elevated PA pressures  - wean as tolerated    Pulm: On minimal vent settings, saturating well, gas normal    Recent Labs  Lab 02/10/17  0715   PH 7.431   PCO2 28.3*   PO2 124*   HCO3 18.8*   POCSATURATED 99   BE -5   - Wean to extubate    Renal: Minimal uop, pt is HD dependent (ESRD)  Recent Labs      02/10/17   0319   BUN  59*   CREATININE  6.1*   - CVP 11 (17 overnight), no evidence of volume overload, elevated bun/cr, no significant electrolyte abnormalities  - Nephrology following, appreciate recs    Hem/ID: afebrile, mild stable leukocytosis, h/h stable  Recent Labs      02/10/17   0319   HGB  8.0*  8.0*   HCT  23.7*   WBC  9.69   - trend labs    Endocrine: euglycemic off of insulin gtt    FEN/GI: npo  - no significant electrolyte abnormalities   - trend labs    Dispo: Continue icu care until extubated, then can consider stepdown      Jose Viramontes MD PGY II  [phone]

## 2017-02-10 NOTE — PLAN OF CARE
Problem: Patient Care Overview  Goal: Plan of Care Review  PHM: CHF, pulmonary HTN, CKD3, Hep B    PSH: ASD repair 7 y/o    Hospital Course:    2/8: Admit SICU Aortic valve replacement, Tricuspid valve repair, Redo sternotomy; 1L albumin, 2 amps bicarb, 2 unit PRBC  2/9: 2 PRBC, 1 amp bicarb, 1 500 5%Albumin, 1-100mL 25 %Albumin Levo/Milrinone off, wean Epi/Vaso, Heparin gtt started    Nursing:  * Accucheck Q6h  *Renal Function Panel q8h  *PTT q6h goal 60-80  *Fibrinogen q6h  *NO 30ppm wean for PA-S <75, CVP <15, CI >2.2   Outcome: Ongoing (interventions implemented as appropriate)  Pt is awake, alert and oriented x4. Pt extubated at 1330. Remains on high flow cannula at 10L. Pt started on CRRT at 1500. Will run for 12hours. UF at goal of 400. Pt passed swallow eval. Likes laying on left side. UOP remains low and  increased throughout the day, MD's aware. Remains on Heparin gtt, PTT q6h, next draw 1930. Sister called for updates. Pt on Cardene for target MAP of 60-80. No skin breakdown noted, free of falls thus far during shift. Will continue to monitor.

## 2017-02-10 NOTE — PROGRESS NOTES
Progress Note  Nephrology    Admit Date: 2/2/2017   LOS: 8 days     SUBJECTIVE:     Follow-up For:  ESRD on HD     Interval follow up: NAEON. Patient wasn't started on CRRT due to improvement of potassium levels and labile BP levels. With improvement of ventilator settings and off vasopressors. Currently on Cardene.     OBJECTIVE:     Vital Signs (Most Recent)  Temp: 98.7 °F (37.1 °C) (02/10/17 0700)  Pulse: 86 (02/10/17 0900)  Resp: 16 (02/10/17 0711)  BP: (!) 103/59 (02/10/17 0000)  SpO2: (!) 94 % (02/10/17 0900)    Vital Signs Range (Last 24H):  Temp:  [97.8 °F (36.6 °C)-98.7 °F (37.1 °C)]   Pulse:  [82-95]   Resp:  [16-19]   BP: (103)/(59)   SpO2:  [94 %-100 %]   Arterial Line BP: ()/(49-71)       Intake/Output Summary (Last 24 hours) at 02/10/17 1000  Last data filed at 02/10/17 0900   Gross per 24 hour   Intake           3183.3 ml   Output              958 ml   Net           2225.3 ml     Physical Exam:  General: Well developed, well nourished  HEENT: Conjunctiva clear; ETT in place   Neck: No JVD noted, Supple  Chest : chest tubes in place, surgical incision dressing c/d/i  CV- Normal S1, S2 with no murmurs,gallops,rubs  Resp- Lungs CTA Bilaterally, Unlabored  Abdomen- NTND, BS normoactive x4 quads, soft  Extrem- No cyanosis, clubbing, edema.  Skin- No rashes, lesions, ulcers  Neuro: sedated, no FND   Access: permcath     Laboratory:  CBC:     Recent Labs  Lab 02/10/17  0319   WBC 9.69   RBC 2.68*   HGB 8.0*  8.0*   HCT 23.7*   PLT 87*   MCV 88   MCH 29.9   MCHC 33.8     CMP:   Recent Labs  Lab 02/08/17  1714  02/10/17  0900   *  < > 103   CALCIUM 7.6*  < > 8.7   ALBUMIN 2.2*  < > 2.7*   PROT 5.1*  --   --      < > 142   K 5.0  < > 4.6   CO2 21*  < > 20*   *  < > 110   BUN 40*  < > 62*   CREATININE 3.9*  < > 6.5*   ALKPHOS 53*  --   --    ALT 37  --   --    AST 51*  --   --    BILITOT 1.3*  --   --    < > = values in this interval not displayed.  ABGs:     Recent Labs  Lab  02/10/17  0715   PH 7.431   PCO2 28.3*   HCO3 18.8*   POCSATURATED 99   BE -5       Diagnostic Results:  Labs: Reviewed  X-Ray: Reviewed    ASSESSMENT/PLAN:     47 y.o. AAM with h/o ASD repair at 9 yo, aortic stenosis with CHF wirh rEF, pulmonary HTN, active hep B transferred from [facility] for surgery eval for AVR.     Now s/p redo sternotomy with  AVR and Tricuspid valve repair (2/8/2017)     CKD 5 progressed to ESRD on IHD MWF / TTS    - With improvement of ventilator settings and BP levels   - Net + 2.4L since yesterday  - Will do 12 hours CRRT for volume and metabolic clearance   - - 400 as tolerated by patient  - CVP 10 this morning   - Monitor strict I/Os, daily weights  - Will continue to monitor.    Patrick Ferrera   Nephrology fellow PGY- 5  [phone]

## 2017-02-10 NOTE — PLAN OF CARE
Intubated with pressors.        02/10/17 0734   Right Care Assessment   Can the patient answer the patient profile reliably? No, cognitively impaired

## 2017-02-10 NOTE — SUBJECTIVE & OBJECTIVE
"Interval HPI:   BG below goal, no hypoglycemia.     Visit Vitals    BP (!) 103/59 (BP Location: Right arm, Patient Position: Lying, BP Method: Automatic)    Pulse 84    Temp 98.7 °F (37.1 °C) (Oral)    Resp 16    Ht 6' 2" (1.88 m)    Wt 98.1 kg (216 lb 4.3 oz)    SpO2 95%    BMI 27.77 kg/m2       Labs Reviewed and Include      Recent Labs  Lab 02/10/17  0900      CALCIUM 8.7   ALBUMIN 2.7*      K 4.6   CO2 20*      BUN 62*   CREATININE 6.5*     Lab Results   Component Value Date    WBC 9.69 02/10/2017    HGB 8.0 (L) 02/10/2017    HGB 8.0 (L) 02/10/2017    HCT 23.7 (L) 02/10/2017    MCV 88 02/10/2017    PLT 87 (L) 02/10/2017     No results for input(s): TSH, FREET4 in the last 168 hours.  No results found for: HGBA1C    Nutritional status:   Body mass index is 27.77 kg/(m^2).  Lab Results   Component Value Date    ALBUMIN 2.7 (L) 02/10/2017    ALBUMIN 2.9 (L) 02/09/2017    ALBUMIN 3.0 (L) 02/09/2017     No results found for: PREALBUMIN    Estimated Creatinine Clearance: 16.5 mL/min (based on Cr of 6.5).    Accu-Checks  Recent Labs      02/09/17   0309  02/09/17   0419  02/09/17   0500  02/09/17   0554  02/09/17   0723  02/09/17   0807  02/09/17   1118  02/09/17   1801  02/09/17   2349  02/10/17   0545   POCTGLUCOSE  164*  150*  169*  171*  165*  124*  171*  139*  122*  104       Current Medications and/or Treatments Impacting Glycemic Control  Immunotherapy:  Immunosuppressants     None        Steroids:   Hormones     None        Pressors:    Autonomic Drugs     Start     Stop Route Frequency Ordered    02/08/17 1831  EPINEPHrine (ADRENALIN) 1 mg/mL injection     Comments:  Created by cabinet override    02/09 0644   02/08/17 1831        Hyperglycemia/Diabetes Medications: Antihyperglycemics     Start     Stop Route Frequency Ordered    02/09/17 0919  insulin aspart pen 0-5 Units      -- SubQ Every 6 hours PRN 02/09/17 0819        "

## 2017-02-10 NOTE — ASSESSMENT & PLAN NOTE
bg goal 140-180 r/t CKD 5  low dose correction with q6h bg monitoring    Discharge planning- anticipate resolution

## 2017-02-10 NOTE — ASSESSMENT & PLAN NOTE
Avoid hypoglycemia  Estimated Creatinine Clearance: 16.5 mL/min (based on Cr of 6.5).   Nephrology following

## 2017-02-10 NOTE — PROGRESS NOTES
"[facility]  Endocrinology  Progress Note    Admit Date: 2/2/2017     Reason for Consult: Management of  Hyperglycemia     Surgical Procedure and Date: 2/8/17 1) REPLACEMENT-VALVE-AORTIC with a 22 mm Medtronic ATS open pivot mechanical prosthesis [id number], 2) TRICUSPID VALVE REPAIR with a 28 mm Medtronic Contour annuloplasty band [id number]  3) REDO STERNOTOMY WITH AORTIC VALVE REPLACEMENT/redo sternotomy 95369    HPI: Patient is a 47 y.o. male with ASD repair at age 8, AS with CHF, pulmonary HTN, CKDIII, active hep B transferred from [facility] for surgery eval.  Patient now aortic valve replacement . Endocrine consulted for bg management.       Interval HPI:   BG below goal, no hypoglycemia.     Visit Vitals    BP (!) 103/59 (BP Location: Right arm, Patient Position: Lying, BP Method: Automatic)    Pulse 84    Temp 98.7 °F (37.1 °C) (Oral)    Resp 16    Ht 6' 2" (1.88 m)    Wt 98.1 kg (216 lb 4.3 oz)    SpO2 95%    BMI 27.77 kg/m2       Labs Reviewed and Include      Recent Labs  Lab 02/10/17  0900      CALCIUM 8.7   ALBUMIN 2.7*      K 4.6   CO2 20*      BUN 62*   CREATININE 6.5*     Lab Results   Component Value Date    WBC 9.69 02/10/2017    HGB 8.0 (L) 02/10/2017    HGB 8.0 (L) 02/10/2017    HCT 23.7 (L) 02/10/2017    MCV 88 02/10/2017    PLT 87 (L) 02/10/2017     No results for input(s): TSH, FREET4 in the last 168 hours.  No results found for: HGBA1C    Nutritional status:   Body mass index is 27.77 kg/(m^2).  Lab Results   Component Value Date    ALBUMIN 2.7 (L) 02/10/2017    ALBUMIN 2.9 (L) 02/09/2017    ALBUMIN 3.0 (L) 02/09/2017     No results found for: PREALBUMIN    Estimated Creatinine Clearance: 16.5 mL/min (based on Cr of 6.5).    Accu-Checks  Recent Labs      02/09/17   0309  02/09/17   0419  02/09/17   0500  02/09/17   0554  02/09/17   0723  02/09/17   0807  02/09/17   1118  02/09/17   1801  02/09/17   2349  02/10/17   0545   POCTGLUCOSE  164*  150*  " 169*  171*  165*  124*  171*  139*  122*  104       Current Medications and/or Treatments Impacting Glycemic Control  Immunotherapy:  Immunosuppressants     None        Steroids:   Hormones     None        Pressors:    Autonomic Drugs     Start     Stop Route Frequency Ordered    02/08/17 1831  EPINEPHrine (ADRENALIN) 1 mg/mL injection     Comments:  Created by cabinet override    02/09 0644 02/08/17 1831        Hyperglycemia/Diabetes Medications: Antihyperglycemics     Start     Stop Route Frequency Ordered    02/09/17 0919  insulin aspart pen 0-5 Units      -- SubQ Every 6 hours PRN 02/09/17 0819          ASSESSMENT and PLAN    Hyperglycemia  bg goal 140-180 r/t CKD 5  low dose correction with q6h bg monitoring  Will sign off today, no insulin needs overnight. Re-consult if needed.     Discharge planning- anticipate resolution       CKD stage 5 secondary to hypertension  Avoid hypoglycemia  Estimated Creatinine Clearance: 16.5 mL/min (based on Cr of 6.5).   Nephrology following      Adult congenital heart disease  S/p AVR    S/P AVR (aortic valve replacement)    Per CTS    Postprocedural hypotension  Requiring vasopressors for hemodynamic stability  Vaso, levo gtt titrations per CTS      Brandi Lockhart APRN, FNP  Endocrinology  [facility]

## 2017-02-10 NOTE — PLAN OF CARE
Problem: Patient Care Overview  Goal: Plan of Care Review  PHM: CHF, pulmonary HTN, CKD3, Hep B    PSH: ASD repair 9 y/o    Hospital Course:    2/8: Admit SICU Aortic valve replacement, Tricuspid valve repair, Redo sternotomy; 1L albumin, 2 amps bicarb, 2 unit PRBC  2/9: 2 PRBC, 1 amp bicarb, 1 500 5%Albumin, 1-100mL 25 %Albumin Levo/Milrinone off, wean Epi/Vaso, Heparin gtt started    Nursing:  * Accucheck Q6h  *Renal Function Panel q8h  *PTT q6h goal 60-80  *Fibrinogen q6h  *NO 30ppm wean for PA-S <75, CVP <15, CI >2.2   Outcome: Ongoing (interventions implemented as appropriate)  Pt remains intubated on ventilator, AC RR 14 60% 5 peep, 30 ppm NO. Weaning NO for PA-S <75, CVP <15, CI >2.2. Pt is drowsy, arouses to voice, follows commands and moves all extremities. Pt is currently on several gtt's including Epi 0.06mcg/kg/min, Vaso 0.02mcg/kg/min, Propofol 15mcg/kg/min, Heparin 1200 units/hr, Fentanyl 75mcg/hr, and MIVF. UOP remains low.  minimal. Pt to be started on CRRT later tonight. Labs monitored. Pt received 2 PRBC, 1 amp bicarb, 500 5% Albumin, 100 25% Albumin. Family and pt updated on plan of care. Plan to continue to wean for possible extubation in am. Pt remains free of falls without skin breakdown thus far. Will continue to monitor.

## 2017-02-10 NOTE — PLAN OF CARE
Problem: Patient Care Overview  Goal: Plan of Care Review  PHM: CHF, pulmonary HTN, CKD3, Hep B    PSH: ASD repair 9 y/o    Hospital Course:    2/8: Admit SICU Aortic valve replacement, Tricuspid valve repair, Redo sternotomy; 1L albumin, 2 amps bicarb, 2 unit PRBC  2/9: 2 PRBC, 1 amp bicarb, 1 500 5%Albumin, 1-100mL 25 %Albumin Levo/Milrinone off, wean Epi/Vaso, Heparin gtt started    Nursing:  * Accucheck Q6h  *Renal Function Panel q8h  *PTT q6h goal 60-80  *Fibrinogen q6h  *NO 30ppm wean for PA-S <75, CVP <15, CI >2.2   Outcome: Ongoing (interventions implemented as appropriate)  All vitals stable overnight.  Pressors weaned as tolerated.  See flow sheets for details.  Pt wakes and follows all commands.  Will continue to monitor pt.

## 2017-02-10 NOTE — PROGRESS NOTES
Progress Note  Surgical Intensive Care    Admit Date: 2/2/2017  Post-operative Day: 2 Days Post-Op  Hospital Day: 9    SUBJECTIVE:     Follow-up For:  Procedure(s) (LRB):  REPLACEMENT-VALVE-AORTIC (N/A)  REDO STERNOTOMY WITH AORTIC VALVE REPLACEMENT/redo sternotomy (N/A)  REPAIR-VALVE-TRICUSPID    HPI: Patient is a 47 y.o. male with h/o ASD repair at 9 yo, AS with CHF wirh rEF, pulmonary HTN, CKDIII, active hep B transferred from [facility] for surgery eval. Recently admitted to OSH for SVT, HTN, CHF exacerbation 1/23 and discharged on coreg, clonidine, verapamil, readmitted to OSH 1/25 with chest pain and found to be in heart block which was thought to be medication induced. Pt came out of SVT, out of ICU. He had worsening of his CKD and had a tunneled RIJ line placed for HD. Pt reports still making urine. Patient underwent aortic valve replacement that was remarkable for mild difficulty going off pump due to systolic dysfunction. Patient arrived to SICU intubated on epinephrine, milrinone, norepinephrine and vasopressin and propofol and fentanyl.     Interval history: NAEON, patient off all pressors and nitric this morning. Maintaining MAP>60. Goal for extubation today.     Continuous Infusions:   sodium chloride 0.45% 10 mL/hr (02/09/17 0800)    dexmedetomidine (PRECEDEX) infusion 0.3 mcg/kg/hr (02/10/17 1100)    heparin (porcine) in 5 % dex 1,500 Units/hr (02/10/17 1333)    nicardipine Stopped (02/10/17 1100)     Scheduled Meds:   albuterol-ipratropium 2.5mg-0.5mg/3mL  3 mL Nebulization Q4H    ascorbic acid (vitamin C)  500 mg Oral BID    aspirin  325 mg Oral Daily    chlorhexidine  15 mL Mouth/Throat BID    epoetin vikki (PROCRIT) injection  10,000 Units Intravenous Every Mon, Wed, Fri    ferric gluconate (FERRLECIT) IVPB  125 mg Intravenous Every Mon, Wed, Fri    mupirocin  1 g Nasal BID    pantoprazole  40 mg Intravenous Daily    polyethylene glycol  17 g Per G Tube Daily     PRN  Meds:sodium chloride 0.9%, sodium chloride, sodium chloride, acetaminophen, dextrose 50%, glucagon (human recombinant), heparin (porcine), hydrocodone-apap 2.5-108 MG/5 ML, hydrocodone-apap 2.5-108 MG/5 ML, insulin aspart, lactulose, magnesium sulfate IVPB, metoclopramide HCl, morphine, ondansetron, sodium phosphate IVPB, sodium phosphate IVPB, sodium phosphate IVPB    Review of patient's allergies indicates:  No Known Allergies    OBJECTIVE:     Vital Signs (Most Recent)  Temp: 99.4 °F (37.4 °C) (02/10/17 1100)  Pulse: 84 (02/10/17 1124)  Resp: 16 (02/10/17 1124)  BP: (!) 103/59 (02/10/17 0000)  SpO2: (!) 94 % (02/10/17 1124)    Vital Signs Range (Last 24H):  Temp:  [97.8 °F (36.6 °C)-99.4 °F (37.4 °C)]   Pulse:  [82-95]   Resp:  [16-19]   BP: (103)/(59)   SpO2:  [94 %-100 %]   Arterial Line BP: ()/(49-71)     I & O (Last 24H):    Intake/Output Summary (Last 24 hours) at 02/10/17 1401  Last data filed at 02/10/17 1100   Gross per 24 hour   Intake           2233.3 ml   Output             1025 ml   Net           1208.3 ml     Ventilator Data (Last 24H):     Vent Mode: A/C  Oxygen Concentration (%):  [40-70] 40  Resp Rate Total:  [14 br/min-24 br/min] 20 br/min  Vt Set:  [600 mL] 600 mL  PEEP/CPAP:  [5 cmH20] 5 cmH20  Pressure Support:  [0 cmH20] 0 cmH20  Mean Airway Pressure:  [10 rwM07-02 cmH20] 11 cmH20    Hemodynamic Parameters (Last 24H):  PAP: ()/() 67/24  PAP (Mean):  [35 mmHg-193 mmHg] 41 mmHg  CO:  [4.4 L/min-9.5 L/min] 9.1 L/min  CI:  [2.7 L/min/m2-10.5 L/min/m2] 4.3 L/min/m2    Physical Exam:  General: well developed, well nourished  HENT: Head:normocephalic, atraumatic.  Eyes: conjunctivae/corneas clear. PERRL.   Neck: supple, symmetrical, trachea midline, no JVD and thyroid not enlarged, symmetric, no tenderness/mass/nodules  Lungs: clear to auscultation bilaterally and normal respiratory effort  Cardiovascular: Heart: tachycardic with regular rhythm, S1, S2 normal, no murmur, click, rub  or gallop. Chest Wall: sternotomy dressing C/D/I. Extremities: no cyanosis or edema, or clubbing. Pulses: 2+ and symmetric.  Abdomen/Rectal: Abdomen: soft, non-tender non-distented; bowel sounds normal; no masses, no organomegaly.   Skin: Skin color, texture, turgor normal. No rashes or lesions    Wound/Incision:   clean, dry, intact    Lines/Drains:       Hemodialysis Catheter right subclavian (Active)   Site Assessment Clean;Dry;Intact;No redness;No swelling;No drainage;No warmth 2/9/2017  3:00 AM   Status Deaccessed 2/9/2017  3:00 AM   Flows Good 2/7/2017  7:47 AM   Dressing Intervention Dressing reinforced 2/6/2017  8:00 AM   Dressing Status Clean;Dry;Intact 2/9/2017  3:00 AM   Dressing Change Due 02/08/17 2/7/2017  8:00 PM   Site Condition No complications 2/9/2017  3:00 AM   Dressing Occlusive 2/9/2017  3:00 AM   Daily Line Review Performed 2/9/2017  3:00 AM   Number of days:            Introducer 02/08/17 left internal jugular (Active)   Specific Qualities [place] in place 2/9/2017  3:00 AM   Dressing Status Clean;Dry;Intact 2/9/2017  3:00 AM   Dressing Change Due 02/15/17 2/9/2017  3:00 AM   Daily Line Review Performed 2/9/2017  3:00 AM   Number of days:1            Pulmonary Artery Catheter Assessment  02/08/17 0820 (Active)   Dressing biopatch in place;dressing dry and intact 2/9/2017  3:00 AM   Securement secured w/ sutures 2/9/2017  3:00 AM   Current Insertion Depth (cm) 49 cm 2/9/2017  3:00 AM   Phlebitis 0-->no symptoms 2/9/2017  3:00 AM   Infiltration 0-->no symptoms 2/9/2017  3:00 AM   Waveform normal 2/9/2017  3:00 AM   Prox Injectate Status Infusing 2/9/2017  3:00 AM   Prox Infusate Status Infusing 2/9/2017  3:00 AM   Distal Status Infusing 2/9/2017  3:00 AM   Daily Line Review Performed 2/9/2017  3:00 AM   Number of days:0            CVC Triple Lumen - PreSep Cath 01/24/17 right internal jugular (Active)   Site Assessment Clean;Dry;Intact;No redness;No swelling;No drainage;No warmth 2/9/2017  3:00 AM    Proximal Lumen Status Infusing 2/9/2017  3:00 AM   Medial Lumen Status Infusing 2/9/2017  3:00 AM   Distal Lumen Status Infusing 2/9/2017  3:00 AM   Dressing Type Transparent 2/9/2017  3:00 AM   Dressing Status Clean;Dry;Intact;Biopatch in place 2/9/2017  3:00 AM   Dressing Intervention New dressing 2/7/2017  7:47 AM   Dressing Change Due 02/13/17 2/9/2017  3:00 AM   Daily Line Review Performed 2/9/2017  3:00 AM   Number of days:16            Peripheral IV - Single Lumen 07/12/16 1535 Right Antecubital (Active)   Number of days:211            Peripheral IV - Single Lumen 02/07/17 1809 Left Forearm (Active)   Site Assessment Clean;Dry;Intact;No redness;No swelling;No warmth;No drainage 2/9/2017  3:00 AM   Line Status Flushed;Infusing 2/9/2017  3:00 AM   Dressing Status Clean;Dry;Intact 2/9/2017  3:00 AM   Dressing Intervention New dressing 2/7/2017  6:10 PM   Dressing Change Due 02/11/17 2/9/2017  3:00 AM   Site Change Due 02/11/17 2/8/2017  7:00 PM   Reason Not Rotated Not due 2/9/2017  3:00 AM   Number of days:1            Peripheral IV - Single Lumen 02/08/17 2243 Right Antecubital (Active)   Site Assessment Clean;Dry;Intact;No redness;No swelling;No drainage;No warmth 2/9/2017  3:00 AM   Line Status Blood return noted;Flushed;Infusing 2/9/2017  3:00 AM   Dressing Status Clean;Dry;Intact 2/9/2017  3:00 AM   Dressing Intervention New dressing 2/9/2017  3:00 AM   Dressing Change Due 02/12/17 2/9/2017  3:00 AM   Site Change Due 02/12/17 2/9/2017  3:00 AM   Reason Not Rotated Not due 2/9/2017  3:00 AM   Number of days:0            Arterial Line 02/08/17 0807 Left Radial (Active)   Site Assessment Clean;Dry;Intact;No swelling;No redness;No warmth;No drainage 2/9/2017  3:00 AM   Line Status Pulsatile blood flow 2/9/2017  3:00 AM   Art Line Waveform Appropriate;Square wave test performed 2/9/2017  3:00 AM   Arterial Line Interventions Leveled;Flushed per protocol 2/9/2017  3:00 AM   Color/Movement/Sensation Capillary  refill less than 3 sec 2/9/2017  3:00 AM   Dressing Type Transparent 2/9/2017  3:00 AM   Dressing Status Clean;Dry;Intact 2/9/2017  3:00 AM   Dressing Change Due 02/12/17 2/9/2017  3:00 AM   Number of days:0            Pacer Wires 02/08/17 1542 (Active)   Pacer Wire Status Ventricular wires disconnected from pacer 2/9/2017  3:00 AM   Site Assessment Clean;Dry;Intact 2/9/2017  3:00 AM   How Pacer Wires are Secured Ventricular wires secured to dressing 2/9/2017  3:00 AM   Dressing Status Clean;Dry;Intact 2/9/2017  3:00 AM   Number of days:0            NG/OG Tube 02/08/17 1100 (Active)   Placement Check placement verified by x-ray;placement verified by distal tube length measurement 2/9/2017  3:00 AM   pH Aspirate Result 5 2/8/2017  5:30 PM   Tolerance no signs/symptoms of discomfort 2/9/2017  3:00 AM   Securement taped to commercial device 2/9/2017  3:00 AM   Clamp Status/Tolerance unclamped 2/9/2017  3:00 AM   Suction Setting/Drainage Method suction initiated at;low;intermittent setting 2/9/2017  3:00 AM   Insertion Site Appearance no redness, warmth, tenderness, skin breakdown, drainage 2/9/2017  3:00 AM   Drainage Green 2/9/2017  3:00 AM   Flush/Irrigation flushed w/;water;no resistance met 2/9/2017  3:00 AM   Number of days:0            Urethral Catheter 02/08/17 0850 Non-latex;Straight-tip;Temperature probe 16 Fr. (Active)   Site Assessment Clean;Intact 2/9/2017  3:00 AM   Collection Container Urimeter 2/9/2017  3:00 AM   Securement Method secured to top of thigh w/ adhesive device 2/9/2017  3:00 AM   Catheter Care Performed yes 2/9/2017  3:00 AM   Reason for Continuing Urinary Catheterization Post operative 2/9/2017  3:00 AM   CAUTI Prevention Bundle Drainage bag off the floor;No dependent loops or kinks 2/8/2017  5:30 PM   Output (mL) 3 mL 2/9/2017  5:45 AM   Number of days:0            Y Chest Tube 1 and 2 02/08/17 1552 Right Mediastinal 19 Fr. 2 Left Mediastinal 19 Fr. (Active)   Function -20 cm H2O 2/9/2017   3:00 AM   Air Leak/Fluctuation air leak present 2/9/2017  3:00 AM   Safety all connections secured 2/9/2017  3:00 AM   Securement tubing anchored to body distal to insertion site w/ tape 2/9/2017  3:00 AM   Left Subcutaneous Emphysema none present 2/9/2017  3:00 AM   Right Subcutaneous Emphysema none present 2/9/2017  3:00 AM   Patency Intervention Stripped;Tip/tilt 2/9/2017  3:00 AM   Drainage Description 1 Serosanguineous 2/9/2017  3:00 AM   Tube 1 Dressing Appearance occlusive gauze dressing intact;clean and dry 2/9/2017  3:00 AM   Site Assessment 1 Not assessed 2/9/2017  3:00 AM   Surrounding Skin 1 Unable to view 2/9/2017  3:00 AM   Drainage Description 2 Sanguineous 2/9/2017  3:00 AM   Tube 2 Dressing Appearance occlusive gauze dressing intact;clean and dry 2/9/2017  3:00 AM   Site Assessment 2 Not assessed 2/9/2017  3:00 AM   Surrounding Skin Unable to view 2/9/2017  3:00 AM   Output (mL) 6 mL 2/9/2017  5:00 AM   Number of days:0            Y Chest Tube 3 and 4 02/08/17 1553 3 Right Pleural 19 Fr. 4 Left Pleural 19 Fr. (Active)   Function -20 cm H2O 2/9/2017  3:00 AM   Air Leak/Fluctuation air leak not present 2/9/2017  3:00 AM   Safety all tubing connections taped 2/9/2017  3:00 AM   Securement tubing anchored to body distal to insertion site w/ tape 2/9/2017  3:00 AM   Left Subcutaneous Emphysema none present 2/9/2017  3:00 AM   Right Subcutaneous Emphysema none present 2/9/2017  3:00 AM   Patency Intervention Stripped;Tip/tilt 2/9/2017  3:00 AM   Drainage Description 3 Serosanguineous 2/9/2017  3:00 AM   Tube 3 Dressing Appearance occlusive gauze dressing intact;clean and dry 2/9/2017  3:00 AM   Site Assessment 3 Not assessed 2/9/2017  3:00 AM   Surrounding Skin 3 Unable to view 2/9/2017  3:00 AM   Drainage Description 4 Sanguineous 2/9/2017  3:00 AM   Tube 4 Dressing Appearance occlusive gauze dressing intact;clean and dry 2/9/2017  3:00 AM   Site Assessment 4 Not assessed 2/9/2017  3:00 AM   Surrounding  Skin 4 Unable to view 2/9/2017  3:00 AM   Output (mL) 10 mL 2/9/2017  5:00 AM   Number of days:0       Laboratory (Last 24H):  CBC:    Recent Labs  Lab 02/10/17  0319   WBC 9.69   HGB 8.0*  8.0*   HCT 23.7*   PLT 87*     CMP:    Recent Labs  Lab 02/10/17  0900   CALCIUM 8.7   ALBUMIN 2.7*      K 4.6   CO2 20*      BUN 62*   CREATININE 6.5*       Chest X-Ray: Aortic valve replacement. Right IJ central line terminates over the right atrium. Second IJ central line terminates over the SVC Howard-Lenny catheter terminates over the pulmonary outflow. Mediastinal drain in place. Bilateral chest tubes.No focal consolidations. There is no pleural effusion or pneumothorax. The cardiac silhouette isenlarged in size.  There are no acute bony abnormalities.    ASSESSMENT/PLAN:         Neuro:   Sedated with precedex  - RASS 0     Pulmonary:   Weaning vent settings  - nitric oxide weaned off  - PA pressures remain elevated        Cardiac:  S/p AVR   CHF   Dramatically improved today      Renal:   Requiring RRT,   - increasing Cr with mild hyperkalemia, pt may require CRRT today   - metabolic acidosis remains        Infectious Disease:   Active Hep B   Ancef per primary for post Op prophylaxis      Hematology/Oncology:  WBC WNL  H/H trended down 8.9/26.4 --> 7.6/22.7  S/p transfusion 8.0/23.7    2 U PRBC given overnight with mild improvement 8.1/24.4      Endocrine:  No issues currently, not on insulin gtt   Glu 103     Fluids/Electrolytes/Nutrition/GI:   NPO currently   Replete electrolytes prn being mindful of renal function       Pain Management:   precedex gtt   Dilaudid 1mg q4h prn         Aishwarya Elam MD  [facility]   Anesthesiology

## 2017-02-10 NOTE — ANESTHESIA POSTPROCEDURE EVALUATION
"Anesthesia Post Evaluation    Patient: Isidro White    Procedure(s) Performed: Procedure(s) (LRB):  REPLACEMENT-VALVE-AORTIC (N/A)  REDO STERNOTOMY WITH AORTIC VALVE REPLACEMENT/redo sternotomy (N/A)  REPAIR-VALVE-TRICUSPID    Final Anesthesia Type: general  Patient location during evaluation: ICU  Patient participation: No - Unable to Participate, Sedation  Level of consciousness: awake  Post-procedure vital signs: reviewed and stable  Pain management: adequate  Airway patency: patent  PONV status at discharge: No PONV  Anesthetic complications: no      Cardiovascular status: stable  Respiratory status: spontaneous ventilation and face mask  Hydration status: euvolemic  Follow-up not needed.  Comments: Resting comfortably on face mask.  No overt problems with anesthesia.             Visit Vitals    BP (!) 103/59 (BP Location: Right arm, Patient Position: Lying, BP Method: Automatic)    Pulse 84    Temp 37.4 °C (99.4 °F) (Core)    Resp 16    Ht 6' 2" (1.88 m)    Wt 98.1 kg (216 lb 4.3 oz)    SpO2 (!) 94%    BMI 27.77 kg/m2       Pain/Dru Score: Pain Assessment Performed: Yes (2/10/2017 11:00 AM)  Presence of Pain: complains of pain/discomfort (2/10/2017 11:00 AM)  Pain Rating Prior to Med Admin: 9 (2/10/2017 10:58 AM)  Pain Rating Post Med Admin: 4 (2/10/2017  1:38 AM)      "

## 2017-02-10 NOTE — PLAN OF CARE
Pt is still in SICU and intubated.  SW spoke with the NP about the pt. SW will f/u on Monday and will start the outpt HD referral.     Yue Garibay, CALDERON x [phone]

## 2017-02-10 NOTE — PT/OT/SLP PROGRESS
Physical Therapy      Isidro Lozano  White  MRN: [id number]    Patient not seen today secondary to  (pt on hold 2nd to being intubated and sedated. ). Will follow-up at a later date..    Anamaria Dow, PT

## 2017-02-10 NOTE — PT/OT/SLP PROGRESS
Occupational Therapy      Isidro White  MRN: [id number]    Patient not seen today secondary to  (Pt remains intubated with weaning parameters in place). Will follow-up over the weekend.    DOMINICK Brown  2/10/2017

## 2017-02-11 LAB
ALBUMIN SERPL BCP-MCNC: 2.4 G/DL
ALBUMIN SERPL BCP-MCNC: 2.6 G/DL
ALBUMIN SERPL BCP-MCNC: 2.6 G/DL
ANION GAP SERPL CALC-SCNC: 7 MMOL/L
APTT BLDCRRT: 54.2 SEC
APTT BLDCRRT: 57.8 SEC
APTT BLDCRRT: 58.3 SEC
APTT BLDCRRT: 59.9 SEC
APTT BLDCRRT: 61.1 SEC
BASOPHILS # BLD AUTO: 0.01 K/UL
BASOPHILS NFR BLD: 0.1 %
BUN SERPL-MCNC: 19 MG/DL
BUN SERPL-MCNC: 19 MG/DL
BUN SERPL-MCNC: 27 MG/DL
BUN SERPL-MCNC: 32 MG/DL
CALCIUM SERPL-MCNC: 8.2 MG/DL
CALCIUM SERPL-MCNC: 8.2 MG/DL
CALCIUM SERPL-MCNC: 8.3 MG/DL
CALCIUM SERPL-MCNC: 8.5 MG/DL
CHLORIDE SERPL-SCNC: 105 MMOL/L
CHLORIDE SERPL-SCNC: 106 MMOL/L
CO2 SERPL-SCNC: 23 MMOL/L
CO2 SERPL-SCNC: 24 MMOL/L
CREAT SERPL-MCNC: 2.6 MG/DL
CREAT SERPL-MCNC: 2.6 MG/DL
CREAT SERPL-MCNC: 3.4 MG/DL
CREAT SERPL-MCNC: 4 MG/DL
DIFFERENTIAL METHOD: ABNORMAL
EOSINOPHIL # BLD AUTO: 0.4 K/UL
EOSINOPHIL NFR BLD: 3.7 %
ERYTHROCYTE [DISTWIDTH] IN BLOOD BY AUTOMATED COUNT: 15.8 %
EST. GFR  (AFRICAN AMERICAN): 19.4 ML/MIN/1.73 M^2
EST. GFR  (AFRICAN AMERICAN): 23.7 ML/MIN/1.73 M^2
EST. GFR  (AFRICAN AMERICAN): 32.7 ML/MIN/1.73 M^2
EST. GFR  (AFRICAN AMERICAN): 32.7 ML/MIN/1.73 M^2
EST. GFR  (NON AFRICAN AMERICAN): 16.8 ML/MIN/1.73 M^2
EST. GFR  (NON AFRICAN AMERICAN): 20.5 ML/MIN/1.73 M^2
EST. GFR  (NON AFRICAN AMERICAN): 28.3 ML/MIN/1.73 M^2
EST. GFR  (NON AFRICAN AMERICAN): 28.3 ML/MIN/1.73 M^2
GLUCOSE SERPL-MCNC: 104 MG/DL
GLUCOSE SERPL-MCNC: 120 MG/DL
GLUCOSE SERPL-MCNC: 98 MG/DL
GLUCOSE SERPL-MCNC: 98 MG/DL
HCT VFR BLD AUTO: 25.4 %
HCT VFR BLD AUTO: 25.4 %
HGB BLD-MCNC: 8.6 G/DL
HGB BLD-MCNC: 8.6 G/DL
INR PPP: 1.1
LYMPHOCYTES # BLD AUTO: 0.7 K/UL
LYMPHOCYTES NFR BLD: 6.8 %
MAGNESIUM SERPL-MCNC: 2.1 MG/DL
MCH RBC QN AUTO: 30.3 PG
MCHC RBC AUTO-ENTMCNC: 33.9 %
MCV RBC AUTO: 89 FL
MONOCYTES # BLD AUTO: 0.8 K/UL
MONOCYTES NFR BLD: 7.1 %
NEUTROPHILS # BLD AUTO: 8.9 K/UL
NEUTROPHILS NFR BLD: 82 %
PHOSPHATE SERPL-MCNC: 3.5 MG/DL
PHOSPHATE SERPL-MCNC: 4.1 MG/DL
PHOSPHATE SERPL-MCNC: 4.3 MG/DL
PLATELET # BLD AUTO: 96 K/UL
PMV BLD AUTO: 11.2 FL
POCT GLUCOSE: 114 MG/DL (ref 70–110)
POCT GLUCOSE: 114 MG/DL (ref 70–110)
POCT GLUCOSE: 118 MG/DL (ref 70–110)
POTASSIUM SERPL-SCNC: 4.6 MMOL/L
POTASSIUM SERPL-SCNC: 4.7 MMOL/L
POTASSIUM SERPL-SCNC: 4.7 MMOL/L
POTASSIUM SERPL-SCNC: 4.8 MMOL/L
PROTHROMBIN TIME: 11.5 SEC
RBC # BLD AUTO: 2.84 M/UL
SODIUM SERPL-SCNC: 135 MMOL/L
SODIUM SERPL-SCNC: 137 MMOL/L
WBC # BLD AUTO: 10.84 K/UL

## 2017-02-11 PROCEDURE — 97161 PT EVAL LOW COMPLEX 20 MIN: CPT

## 2017-02-11 PROCEDURE — 94640 AIRWAY INHALATION TREATMENT: CPT

## 2017-02-11 PROCEDURE — 80069 RENAL FUNCTION PANEL: CPT | Mod: 91

## 2017-02-11 PROCEDURE — 27000221 HC OXYGEN, UP TO 24 HOURS

## 2017-02-11 PROCEDURE — 85730 THROMBOPLASTIN TIME PARTIAL: CPT

## 2017-02-11 PROCEDURE — 25000242 PHARM REV CODE 250 ALT 637 W/ HCPCS: Performed by: THORACIC SURGERY (CARDIOTHORACIC VASCULAR SURGERY)

## 2017-02-11 PROCEDURE — 85730 THROMBOPLASTIN TIME PARTIAL: CPT | Mod: 91

## 2017-02-11 PROCEDURE — 20000000 HC ICU ROOM

## 2017-02-11 PROCEDURE — 99233 SBSQ HOSP IP/OBS HIGH 50: CPT | Mod: ,,, | Performed by: SURGERY

## 2017-02-11 PROCEDURE — 25000003 PHARM REV CODE 250: Performed by: STUDENT IN AN ORGANIZED HEALTH CARE EDUCATION/TRAINING PROGRAM

## 2017-02-11 PROCEDURE — 63600175 PHARM REV CODE 636 W HCPCS: Performed by: STUDENT IN AN ORGANIZED HEALTH CARE EDUCATION/TRAINING PROGRAM

## 2017-02-11 PROCEDURE — 99232 SBSQ HOSP IP/OBS MODERATE 35: CPT | Mod: ,,, | Performed by: INTERNAL MEDICINE

## 2017-02-11 PROCEDURE — 83735 ASSAY OF MAGNESIUM: CPT | Mod: 91

## 2017-02-11 PROCEDURE — 63600175 PHARM REV CODE 636 W HCPCS: Performed by: THORACIC SURGERY (CARDIOTHORACIC VASCULAR SURGERY)

## 2017-02-11 PROCEDURE — 97530 THERAPEUTIC ACTIVITIES: CPT

## 2017-02-11 PROCEDURE — 94799 UNLISTED PULMONARY SVC/PX: CPT

## 2017-02-11 PROCEDURE — C9113 INJ PANTOPRAZOLE SODIUM, VIA: HCPCS | Performed by: THORACIC SURGERY (CARDIOTHORACIC VASCULAR SURGERY)

## 2017-02-11 PROCEDURE — 85610 PROTHROMBIN TIME: CPT

## 2017-02-11 PROCEDURE — 85025 COMPLETE CBC W/AUTO DIFF WBC: CPT

## 2017-02-11 PROCEDURE — 83735 ASSAY OF MAGNESIUM: CPT

## 2017-02-11 PROCEDURE — 25000003 PHARM REV CODE 250: Performed by: NURSE PRACTITIONER

## 2017-02-11 PROCEDURE — 97166 OT EVAL MOD COMPLEX 45 MIN: CPT

## 2017-02-11 PROCEDURE — 80069 RENAL FUNCTION PANEL: CPT

## 2017-02-11 PROCEDURE — 25000003 PHARM REV CODE 250: Performed by: THORACIC SURGERY (CARDIOTHORACIC VASCULAR SURGERY)

## 2017-02-11 RX ORDER — MAGNESIUM SULFATE HEPTAHYDRATE 40 MG/ML
2 INJECTION, SOLUTION INTRAVENOUS
Status: ACTIVE | OUTPATIENT
Start: 2017-02-11 | End: 2017-02-12

## 2017-02-11 RX ORDER — HYDROCODONE BITARTRATE AND ACETAMINOPHEN 500; 5 MG/1; MG/1
TABLET ORAL
Status: ACTIVE | OUTPATIENT
Start: 2017-02-11 | End: 2017-02-12

## 2017-02-11 RX ORDER — AMLODIPINE BESYLATE 10 MG/1
10 TABLET ORAL DAILY
Status: DISCONTINUED | OUTPATIENT
Start: 2017-02-11 | End: 2017-02-17

## 2017-02-11 RX ORDER — OXYCODONE HYDROCHLORIDE 5 MG/1
5 TABLET ORAL EVERY 4 HOURS PRN
Status: DISCONTINUED | OUTPATIENT
Start: 2017-02-11 | End: 2017-02-21 | Stop reason: HOSPADM

## 2017-02-11 RX ORDER — HYDRALAZINE HYDROCHLORIDE 20 MG/ML
10 INJECTION INTRAMUSCULAR; INTRAVENOUS EVERY 6 HOURS PRN
Status: DISCONTINUED | OUTPATIENT
Start: 2017-02-11 | End: 2017-02-21

## 2017-02-11 RX ORDER — FUROSEMIDE 10 MG/ML
100 INJECTION INTRAMUSCULAR; INTRAVENOUS ONCE
Status: COMPLETED | OUTPATIENT
Start: 2017-02-11 | End: 2017-02-11

## 2017-02-11 RX ORDER — OXYCODONE HYDROCHLORIDE 5 MG/1
10 TABLET ORAL EVERY 4 HOURS PRN
Status: DISCONTINUED | OUTPATIENT
Start: 2017-02-11 | End: 2017-02-21

## 2017-02-11 RX ADMIN — OXYCODONE HYDROCHLORIDE AND ACETAMINOPHEN 500 MG: 500 TABLET ORAL at 09:02

## 2017-02-11 RX ADMIN — HEPARIN SODIUM AND DEXTROSE 1700 UNITS/HR: 10000; 5 INJECTION INTRAVENOUS at 06:02

## 2017-02-11 RX ADMIN — ASPIRIN 81 MG: 81 TABLET, COATED ORAL at 08:02

## 2017-02-11 RX ADMIN — NICARDIPINE HYDROCHLORIDE 10 MG/HR: 0.2 INJECTION, SOLUTION INTRAVENOUS at 01:02

## 2017-02-11 RX ADMIN — NICARDIPINE HYDROCHLORIDE 10 MG/HR: 0.2 INJECTION, SOLUTION INTRAVENOUS at 04:02

## 2017-02-11 RX ADMIN — OXYCODONE HYDROCHLORIDE AND ACETAMINOPHEN 500 MG: 500 TABLET ORAL at 08:02

## 2017-02-11 RX ADMIN — HYDRALAZINE HYDROCHLORIDE 10 MG: 20 INJECTION INTRAMUSCULAR; INTRAVENOUS at 01:02

## 2017-02-11 RX ADMIN — MORPHINE SULFATE 3 MG: 2 INJECTION, SOLUTION INTRAMUSCULAR; INTRAVENOUS at 08:02

## 2017-02-11 RX ADMIN — OXYCODONE HYDROCHLORIDE 10 MG: 5 TABLET ORAL at 11:02

## 2017-02-11 RX ADMIN — SILDENAFIL 20 MG: 20 TABLET ORAL at 06:02

## 2017-02-11 RX ADMIN — IPRATROPIUM BROMIDE AND ALBUTEROL SULFATE 3 ML: .5; 3 SOLUTION RESPIRATORY (INHALATION) at 09:02

## 2017-02-11 RX ADMIN — NICARDIPINE HYDROCHLORIDE 12.5 MG/HR: 0.2 INJECTION, SOLUTION INTRAVENOUS at 08:02

## 2017-02-11 RX ADMIN — FUROSEMIDE 100 MG: 10 INJECTION, SOLUTION INTRAMUSCULAR; INTRAVENOUS at 09:02

## 2017-02-11 RX ADMIN — PANTOPRAZOLE SODIUM 40 MG: 40 INJECTION, POWDER, FOR SOLUTION INTRAVENOUS at 08:02

## 2017-02-11 RX ADMIN — HYDROCODONE BITARTRATE AND ACETAMINOPHEN 20 ML: 2.5; 108 SOLUTION ORAL at 06:02

## 2017-02-11 RX ADMIN — IPRATROPIUM BROMIDE AND ALBUTEROL SULFATE 3 ML: .5; 3 SOLUTION RESPIRATORY (INHALATION) at 04:02

## 2017-02-11 RX ADMIN — HEPARIN SODIUM AND DEXTROSE 1500 UNITS/HR: 10000; 5 INJECTION INTRAVENOUS at 01:02

## 2017-02-11 RX ADMIN — NICARDIPINE HYDROCHLORIDE 15 MG/HR: 0.2 INJECTION, SOLUTION INTRAVENOUS at 05:02

## 2017-02-11 RX ADMIN — MORPHINE SULFATE 3 MG: 2 INJECTION, SOLUTION INTRAMUSCULAR; INTRAVENOUS at 04:02

## 2017-02-11 RX ADMIN — POLYETHYLENE GLYCOL 3350 17 G: 17 POWDER, FOR SOLUTION ORAL at 08:02

## 2017-02-11 RX ADMIN — SILDENAFIL 20 MG: 20 TABLET ORAL at 09:02

## 2017-02-11 RX ADMIN — OXYCODONE HYDROCHLORIDE 5 MG: 5 TABLET ORAL at 05:02

## 2017-02-11 RX ADMIN — IPRATROPIUM BROMIDE AND ALBUTEROL SULFATE 3 ML: .5; 3 SOLUTION RESPIRATORY (INHALATION) at 12:02

## 2017-02-11 RX ADMIN — SILDENAFIL 20 MG: 20 TABLET ORAL at 01:02

## 2017-02-11 RX ADMIN — CHLORHEXIDINE GLUCONATE 15 ML: 1.2 RINSE ORAL at 09:02

## 2017-02-11 RX ADMIN — AMLODIPINE BESYLATE 10 MG: 10 TABLET ORAL at 08:02

## 2017-02-11 RX ADMIN — MUPIROCIN 1 G: 20 OINTMENT TOPICAL at 08:02

## 2017-02-11 RX ADMIN — WARFARIN SODIUM 3 MG: 2 TABLET ORAL at 05:02

## 2017-02-11 NOTE — PT/OT/SLP EVAL
Physical Therapy  Evaluation    Isidro White   MRN: [id number]   Admitting Diagnosis: Nonrheumatic aortic valve stenosis    PT Received On: 17  PT Start Time: 1105     PT Stop Time: 1121    PT Total Time (min): 16 min       Billable Minutes:  Evaluation 8 min and Therapeutic Activity 8 min    Diagnosis: Nonrheumatic aortic valve stenosis  S/p AVR, TVr, re-do sternotomy 17    Past Medical History   Diagnosis Date    CKD (chronic kidney disease) stage 3, GFR 30-59 ml/min     Hypertension     Tachycardia       Past Surgical History   Procedure Laterality Date    Cardiac surgery         Referring physician: Zafar Carroll  Date referred to PT: 17    General Precautions: Standard, fall, sternal  Orthopedic Precautions:     Braces:         Do you have any cultural, spiritual, Faith conflicts, given your current situation?:  (none)    Patient History:  Lives With: sibling(s) (pt works full-time as  and lives with his brother who works full-time. pt sister will assist after discharge if needed.)  Living Arrangements: house (1 story, slab)  Equipment Currently Used at Home: none  DME owned (not currently used): none    Previous Level of Function:  Ambulation Skills: independent  Transfer Skills: independent    Subjective:  Communicated with nurse prior to session.    Chief Complaint: pt c/o pain during treatment.   Patient goals: to get better and go home.     Pain Ratin/10         Location:  (chest/back)     Pain Rating Post-Intervention: 5/10    Objective:   Patient found with: telemetry, arterial line, pulse ox (continuous), blood pressure cuff, central line, chest tube, SCD, oxygen (CVP, dilaysis catheter, B Darren Cynthia Boots)     Cognitive Exam:  Oriented to: Person, Place, Time and Situation    Follows Commands/attention: Follows multistep  commands  Communication: clear/fluent  Safety awareness/insight to disability: intact    Physical Exam:    Lower Extremity Range of  Motion:  Right Lower Extremity: WFL  Left Lower Extremity: WFL    Lower Extremity Strength:  Right Lower Extremity: WFL  Left Lower Extremity: WFL       Functional Mobility:  Bed Mobility:  Rolling/Turning to Left: Minimum assistance (pt needed verbal cues for functional mobility with sternal precautions. )  Supine to Sit: Minimum Assistance    Transfers:  Sit <> Stand Assistance: Contact Guard Assistance  Sit <> Stand Assistive Device: No Assistive Device    Gait:   Gait Distance: 2 steps to bedside chair.;  Assistance 1: Contact Guard Assistance    Therapeutic Activities and Exercises:  Pt received verbal and written instructions in sternal precautions and verbally expressed understanding of such.     AM-PAC 6 CLICK MOBILITY  How much help from another person does this patient currently need?   1 = Unable, Total/Dependent Assistance  2 = A lot, Maximum/Moderate Assistance  3 = A little, Minimum/Contact Guard/Supervision  4 = None, Modified [place]/Independent    Turning over in bed (including adjusting bedclothes, sheets and blankets)?: 3  Sitting down on and standing up from a chair with arms (e.g., wheelchair, bedside commode, etc.): 3  Moving from lying on back to sitting on the side of the bed?: 3  Moving to and from a bed to a chair (including a wheelchair)?: 3  Need to walk in hospital room?: 2  Climbing 3-5 steps with a railing?: 1  Total Score: 15     AM-PAC Raw Score CMS G-Code Modifier Level of Impairment Assistance   6 % Total / Unable   7 - 9 CM 80 - 100% Maximal Assist   10 - 14 CL 60 - 80% Moderate Assist   15 - 19 CK 40 - 60% Moderate Assist   20 - 22 CJ 20 - 40% Minimal Assist   23 CI 1-20% SBA / CGA   24 CH 0% Independent/ Mod I     Patient left up in chair with all lines intact and call button in reach.    Assessment:   Isidro White is a 47 y.o. male with a medical diagnosis of Nonrheumatic aortic valve stenosis and presents with decreased mobility, transfers and decreased  distance ambulated. Pt would benefit from cont PT to address deficits and will be able to discharge home with no therapy or DME needs. Pt will benefit from skilled PT 5x/wk to maximize physical recovery and return pt to Independent status. Pt is s/p AVR, TVr, re-do sternotomy 17..    Rehab identified problem list/impairments: Rehab identified problem list/impairments: impaired endurance, gait instability, impaired functional mobilty    Rehab potential is excellent.    Activity tolerance: Good    Discharge recommendations: Discharge Facility/Level Of Care Needs:  (no further PT will be needed.)     Barriers to discharge: Barriers to Discharge: None    Equipment recommendations: Equipment Needed After Discharge: none     GOALS:   Physical Therapy Goals        Problem: Physical Therapy Goal    Goal Priority Disciplines Outcome Goal Variances Interventions   Physical Therapy Goal     PT/OT, PT      Description:  Goals to be met by: 17    Patient will increase functional independence with mobility by performin. Supine to sit with Stand-by Assistance - not met  2. Sit to stand transfer with Supervision - not met  3. Gait  x 220 feet with Supervision  - not met                 PLAN:    Patient to be seen 6 x/week to address the above listed problems via gait training, therapeutic activities  Plan of Care expires: 17  Plan of Care reviewed with: patient          Anamaria Dow, PT  2017

## 2017-02-11 NOTE — PLAN OF CARE
Problem: Physical Therapy Goal  Goal: Physical Therapy Goal  Goals to be met by: 17    Patient will increase functional independence with mobility by performin. Supine to sit with Stand-by Assistance - not met  2. Sit to stand transfer with Supervision - not met  3. Gait x 220 feet with Supervision - not met   eval completed and goals appropriate. Anamaria Dow, PT 2017

## 2017-02-11 NOTE — PROGRESS NOTES
Progress Note  Surgical Intensive Care    Admit Date: 2/2/2017  Post-operative Day: 3 Days Post-Op  Hospital Day: 10    SUBJECTIVE:     Follow-up For:  Procedure(s) (LRB):  REPLACEMENT-VALVE-AORTIC (N/A)  REDO STERNOTOMY WITH AORTIC VALVE REPLACEMENT/redo sternotomy (N/A)  REPAIR-VALVE-TRICUSPID    HPI: Patient is a 47 y.o. male with h/o ASD repair at 7 yo, AS with CHF wirh rEF, pulmonary HTN, CKDIII, active hep B transferred from [facility] for surgery eval. Recently admitted to OSH for SVT, HTN, CHF exacerbation 1/23 and discharged on coreg, clonidine, verapamil, readmitted to OSH 1/25 with chest pain and found to be in heart block which was thought to be medication induced. Pt came out of SVT, out of ICU. He had worsening of his CKD and had a tunneled RIJ line placed for HD. Pt reports still making urine. Patient underwent aortic valve replacement that was remarkable for mild difficulty going off pump due to systolic dysfunction. Patient arrived to SICU intubated on epinephrine, milrinone, norepinephrine and vasopressin and propofol and fentanyl.     Interval history: NAEON. Maintaining MAP>60. Extubated yesterday. Added cardene overnight. Plan is for stepdown after cardene weaned.     Continuous Infusions:   sodium chloride 0.45% 10 mL/hr (02/09/17 0800)    heparin (porcine) in 5 % dex 1,600 Units/hr (02/11/17 1000)    nicardipine 7.5 mg/hr (02/11/17 1000)     Scheduled Meds:   albuterol-ipratropium 2.5mg-0.5mg/3mL  3 mL Nebulization Q4H    amlodipine  10 mg Oral Daily    ascorbic acid (vitamin C)  500 mg Oral BID    aspirin  81 mg Oral Daily    chlorhexidine  15 mL Mouth/Throat BID    epoetin vikki (PROCRIT) injection  10,000 Units Intravenous Every Mon, Wed, Fri    ferric gluconate (FERRLECIT) IVPB  125 mg Intravenous Every Mon, Wed, Fri    pantoprazole  40 mg Intravenous Daily    polyethylene glycol  17 g Per G Tube Daily    sildenafil  20 mg Oral TID    warfarin  3 mg Oral Daily      PRN Meds:sodium chloride 0.9%, sodium chloride, sodium chloride, acetaminophen, dextrose 50%, glucagon (human recombinant), heparin (porcine), hydrocodone-apap 2.5-108 MG/5 ML, hydrocodone-apap 2.5-108 MG/5 ML, insulin aspart, lactulose, magnesium sulfate IVPB, metoclopramide HCl, morphine, ondansetron, sodium phosphate IVPB, sodium phosphate IVPB, sodium phosphate IVPB    Review of patient's allergies indicates:  No Known Allergies    OBJECTIVE:     Vital Signs (Most Recent)  Temp: 99.1 °F (37.3 °C) (02/11/17 0700)  Pulse: 83 (02/11/17 1000)  Resp: 20 (02/11/17 1000)  BP: (!) 103/59 (02/10/17 0000)  SpO2: (!) 94 % (02/11/17 1000)    Vital Signs Range (Last 24H):  Temp:  [97.1 °F (36.2 °C)-99.6 °F (37.6 °C)]   Pulse:  [79-93]   Resp:  [10-25]   SpO2:  [92 %-99 %]   Arterial Line BP: (106-135)/(54-66)     I & O (Last 24H):    Intake/Output Summary (Last 24 hours) at 02/11/17 1031  Last data filed at 02/11/17 0900   Gross per 24 hour   Intake           3825.3 ml   Output             5756 ml   Net          -1930.7 ml     Ventilator Data (Last 24H):     Oxygen Concentration (%):  [40] 40  Resp Rate Total:  [13 br/min-23 br/min] 16 br/min    Hemodynamic Parameters (Last 24H):  PAP: (62-72)/(18-24) 62/18  PAP (Mean):  [35 mmHg-42 mmHg] 35 mmHg  CO:  [8.5 L/min-12.1 L/min] 9.3 L/min  CI:  [4 L/min/m2-5.7 L/min/m2] 4.4 L/min/m2    Physical Exam:  General: well developed, well nourished, awake, alert, no distress   HENT: Head:normocephalic, atraumatic.  Eyes: conjunctivae/corneas clear. PERRL.   Neck: supple, symmetrical, trachea midline, no JVD and thyroid not enlarged, symmetric, no tenderness/mass/nodules  Lungs: clear to auscultation bilaterally and normal respiratory effort  Cardiovascular: Heart: tachycardic with regular rhythm, S1, S2 normal, no murmur, click, rub or gallop. Chest Wall: sternotomy dressing C/D/I. Extremities: no cyanosis or edema, or clubbing. Pulses: 2+ and symmetric.  Abdomen/Rectal: Abdomen:  soft, non-tender non-distented; bowel sounds normal; no masses, no organomegaly.   Skin: Skin color, texture, turgor normal. No rashes or lesions    Wound/Incision:   clean, dry, intact    Lines/Drains:       Hemodialysis Catheter right subclavian (Active)   Site Assessment Clean;Dry;Intact;No redness;No swelling;No drainage;No warmth 2/9/2017  3:00 AM   Status Deaccessed 2/9/2017  3:00 AM   Flows Good 2/7/2017  7:47 AM   Dressing Intervention Dressing reinforced 2/6/2017  8:00 AM   Dressing Status Clean;Dry;Intact 2/9/2017  3:00 AM   Dressing Change Due 02/08/17 2/7/2017  8:00 PM   Site Condition No complications 2/9/2017  3:00 AM   Dressing Occlusive 2/9/2017  3:00 AM   Daily Line Review Performed 2/9/2017  3:00 AM   Number of days:            Introducer 02/08/17 left internal jugular (Active)   Specific Qualities [place] in place 2/9/2017  3:00 AM   Dressing Status Clean;Dry;Intact 2/9/2017  3:00 AM   Dressing Change Due 02/15/17 2/9/2017  3:00 AM   Daily Line Review Performed 2/9/2017  3:00 AM   Number of days:1            Pulmonary Artery Catheter Assessment  02/08/17 0820 (Active)   Dressing biopatch in place;dressing dry and intact 2/9/2017  3:00 AM   Securement secured w/ sutures 2/9/2017  3:00 AM   Current Insertion Depth (cm) 49 cm 2/9/2017  3:00 AM   Phlebitis 0-->no symptoms 2/9/2017  3:00 AM   Infiltration 0-->no symptoms 2/9/2017  3:00 AM   Waveform normal 2/9/2017  3:00 AM   Prox Injectate Status Infusing 2/9/2017  3:00 AM   Prox Infusate Status Infusing 2/9/2017  3:00 AM   Distal Status Infusing 2/9/2017  3:00 AM   Daily Line Review Performed 2/9/2017  3:00 AM   Number of days:0            CVC Triple Lumen - PreSep Cath 01/24/17 right internal jugular (Active)   Site Assessment Clean;Dry;Intact;No redness;No swelling;No drainage;No warmth 2/9/2017  3:00 AM   Proximal Lumen Status Infusing 2/9/2017  3:00 AM   Medial Lumen Status Infusing 2/9/2017  3:00 AM   Distal Lumen Status Infusing 2/9/2017  3:00 AM    Dressing Type Transparent 2/9/2017  3:00 AM   Dressing Status Clean;Dry;Intact;Biopatch in place 2/9/2017  3:00 AM   Dressing Intervention New dressing 2/7/2017  7:47 AM   Dressing Change Due 02/13/17 2/9/2017  3:00 AM   Daily Line Review Performed 2/9/2017  3:00 AM   Number of days:16            Peripheral IV - Single Lumen 07/12/16 1535 Right Antecubital (Active)   Number of days:211            Peripheral IV - Single Lumen 02/07/17 1809 Left Forearm (Active)   Site Assessment Clean;Dry;Intact;No redness;No swelling;No warmth;No drainage 2/9/2017  3:00 AM   Line Status Flushed;Infusing 2/9/2017  3:00 AM   Dressing Status Clean;Dry;Intact 2/9/2017  3:00 AM   Dressing Intervention New dressing 2/7/2017  6:10 PM   Dressing Change Due 02/11/17 2/9/2017  3:00 AM   Site Change Due 02/11/17 2/8/2017  7:00 PM   Reason Not Rotated Not due 2/9/2017  3:00 AM   Number of days:1            Peripheral IV - Single Lumen 02/08/17 2243 Right Antecubital (Active)   Site Assessment Clean;Dry;Intact;No redness;No swelling;No drainage;No warmth 2/9/2017  3:00 AM   Line Status Blood return noted;Flushed;Infusing 2/9/2017  3:00 AM   Dressing Status Clean;Dry;Intact 2/9/2017  3:00 AM   Dressing Intervention New dressing 2/9/2017  3:00 AM   Dressing Change Due 02/12/17 2/9/2017  3:00 AM   Site Change Due 02/12/17 2/9/2017  3:00 AM   Reason Not Rotated Not due 2/9/2017  3:00 AM   Number of days:0            Arterial Line 02/08/17 0807 Left Radial (Active)   Site Assessment Clean;Dry;Intact;No swelling;No redness;No warmth;No drainage 2/9/2017  3:00 AM   Line Status Pulsatile blood flow 2/9/2017  3:00 AM   Art Line Waveform Appropriate;Square wave test performed 2/9/2017  3:00 AM   Arterial Line Interventions Leveled;Flushed per protocol 2/9/2017  3:00 AM   Color/Movement/Sensation Capillary refill less than 3 sec 2/9/2017  3:00 AM   Dressing Type Transparent 2/9/2017  3:00 AM   Dressing Status Clean;Dry;Intact 2/9/2017  3:00 AM   Dressing  Change Due 02/12/17 2/9/2017  3:00 AM   Number of days:0            Pacer Wires 02/08/17 1542 (Active)   Pacer Wire Status Ventricular wires disconnected from pacer 2/9/2017  3:00 AM   Site Assessment Clean;Dry;Intact 2/9/2017  3:00 AM   How Pacer Wires are Secured Ventricular wires secured to dressing 2/9/2017  3:00 AM   Dressing Status Clean;Dry;Intact 2/9/2017  3:00 AM   Number of days:0            NG/OG Tube 02/08/17 1100 (Active)   Placement Check placement verified by x-ray;placement verified by distal tube length measurement 2/9/2017  3:00 AM   pH Aspirate Result 5 2/8/2017  5:30 PM   Tolerance no signs/symptoms of discomfort 2/9/2017  3:00 AM   Securement taped to commercial device 2/9/2017  3:00 AM   Clamp Status/Tolerance unclamped 2/9/2017  3:00 AM   Suction Setting/Drainage Method suction initiated at;low;intermittent setting 2/9/2017  3:00 AM   Insertion Site Appearance no redness, warmth, tenderness, skin breakdown, drainage 2/9/2017  3:00 AM   Drainage Green 2/9/2017  3:00 AM   Flush/Irrigation flushed w/;water;no resistance met 2/9/2017  3:00 AM   Number of days:0            Urethral Catheter 02/08/17 0850 Non-latex;Straight-tip;Temperature probe 16 Fr. (Active)   Site Assessment Clean;Intact 2/9/2017  3:00 AM   Collection Container Urimeter 2/9/2017  3:00 AM   Securement Method secured to top of thigh w/ adhesive device 2/9/2017  3:00 AM   Catheter Care Performed yes 2/9/2017  3:00 AM   Reason for Continuing Urinary Catheterization Post operative 2/9/2017  3:00 AM   CAUTI Prevention Bundle Drainage bag off the floor;No dependent loops or kinks 2/8/2017  5:30 PM   Output (mL) 3 mL 2/9/2017  5:45 AM   Number of days:0            Y Chest Tube 1 and 2 02/08/17 1552 Right Mediastinal 19 Fr. 2 Left Mediastinal 19 Fr. (Active)   Function -20 cm H2O 2/9/2017  3:00 AM   Air Leak/Fluctuation air leak present 2/9/2017  3:00 AM   Safety all connections secured 2/9/2017  3:00 AM   Securement tubing anchored to  body distal to insertion site w/ tape 2/9/2017  3:00 AM   Left Subcutaneous Emphysema none present 2/9/2017  3:00 AM   Right Subcutaneous Emphysema none present 2/9/2017  3:00 AM   Patency Intervention Stripped;Tip/tilt 2/9/2017  3:00 AM   Drainage Description 1 Serosanguineous 2/9/2017  3:00 AM   Tube 1 Dressing Appearance occlusive gauze dressing intact;clean and dry 2/9/2017  3:00 AM   Site Assessment 1 Not assessed 2/9/2017  3:00 AM   Surrounding Skin 1 Unable to view 2/9/2017  3:00 AM   Drainage Description 2 Sanguineous 2/9/2017  3:00 AM   Tube 2 Dressing Appearance occlusive gauze dressing intact;clean and dry 2/9/2017  3:00 AM   Site Assessment 2 Not assessed 2/9/2017  3:00 AM   Surrounding Skin Unable to view 2/9/2017  3:00 AM   Output (mL) 6 mL 2/9/2017  5:00 AM   Number of days:0            Y Chest Tube 3 and 4 02/08/17 1553 3 Right Pleural 19 Fr. 4 Left Pleural 19 Fr. (Active)   Function -20 cm H2O 2/9/2017  3:00 AM   Air Leak/Fluctuation air leak not present 2/9/2017  3:00 AM   Safety all tubing connections taped 2/9/2017  3:00 AM   Securement tubing anchored to body distal to insertion site w/ tape 2/9/2017  3:00 AM   Left Subcutaneous Emphysema none present 2/9/2017  3:00 AM   Right Subcutaneous Emphysema none present 2/9/2017  3:00 AM   Patency Intervention Stripped;Tip/tilt 2/9/2017  3:00 AM   Drainage Description 3 Serosanguineous 2/9/2017  3:00 AM   Tube 3 Dressing Appearance occlusive gauze dressing intact;clean and dry 2/9/2017  3:00 AM   Site Assessment 3 Not assessed 2/9/2017  3:00 AM   Surrounding Skin 3 Unable to view 2/9/2017  3:00 AM   Drainage Description 4 Sanguineous 2/9/2017  3:00 AM   Tube 4 Dressing Appearance occlusive gauze dressing intact;clean and dry 2/9/2017  3:00 AM   Site Assessment 4 Not assessed 2/9/2017  3:00 AM   Surrounding Skin 4 Unable to view 2/9/2017  3:00 AM   Output (mL) 10 mL 2/9/2017  5:00 AM   Number of days:0       Laboratory (Last 24H):  CBC:    Recent  Labs  Lab 02/11/17  0414   WBC 10.84   HGB 8.6*  8.6*   HCT 25.4*  25.4*   PLT 96*     CMP:    Recent Labs  Lab 02/11/17 0414   CALCIUM 8.2*  8.2*   ALBUMIN 2.6*     137   K 4.7  4.7   CO2 24  24     106   BUN 19  19   CREATININE 2.6*  2.6*       Chest X-Ray: Aortic valve replacement. Right IJ central line terminates over the right atrium. Second IJ central line terminates over the SVC [place] catheter terminates over the pulmonary outflow. Mediastinal drain in place. Bilateral chest tubes.No focal consolidations. There is no pleural effusion or pneumothorax. The cardiac silhouette isenlarged in size.  There are no acute bony abnormalities.    ASSESSMENT/PLAN:         Neuro:   Awake, alert, oriented   No sedation      Pulmonary:   Weaning vent settings  - nitric oxide weaned off  - PA pressures remain elevated        Cardiac:  S/p AVR   CHF   Dramatically improved again today   - CI 4.4  - CVP 10   - PAP 62/18  - CO 9.3   - SVO2 60     Renal:   Requiring RRT,   - s/p CRRT 12h yesterday      Infectious Disease:   Active Hep B   Ancef per primary for post Op prophylaxis      Hematology/Oncology:  WBC WNL  H/H  stable 8.0/23.7 --> 8.6/25.4 today         Endocrine:  No issues currently, not on insulin gtt   Glu WnL     Fluids/Electrolytes/Nutrition/GI:   NPO currently   Replete electrolytes prn being mindful of renal function         Pain Management:   Dilaudid 1mg q4h prn         Aishwarya Elam MD  CA[facility]   Anesthesiology

## 2017-02-11 NOTE — PROGRESS NOTES
"General Surgery Daily Progress Note    Isidro White  47 y.o.    Hospital Day: 9    Post Op Day: 1 Day Post-Op       Subjective  Off pressors, HTN controlled with cardene. Pain controlled. Off vent. CRRT performed yesterday. Patient without significant complaints or concerns.     Objective  Temp:  [97.1 °F (36.2 °C)-99.6 °F (37.6 °C)]   Pulse:  [79-93]   Resp:  [10-25]   BP: (116-118)/(65-70)   SpO2:  [92 %-100 %]   Arterial Line BP: (106-132)/(54-66)     Labs    Recent Labs  Lab 02/11/17 0414   WBC 10.84   RBC 2.84*   HGB 8.6*  8.6*   HCT 25.4*  25.4*   PLT 96*   MCV 89   MCH 30.3   MCHC 33.9       Recent Labs  Lab 02/11/17 0414   CALCIUM 8.2*  8.2*     137   K 4.7  4.7   CO2 24  24     106   BUN 19  19   CREATININE 2.6*  2.6*       Recent Labs  Lab 02/11/17 0414 02/11/17  0756   INR 1.1  --    APTT 59.9* 54.2*       Visit Vitals    /70 (BP Location: Left arm, Patient Position: Sitting, BP Method: Automatic)    Pulse 86    Temp 98.1 °F (36.7 °C) (Oral)    Resp 20    Ht 6' 2" (1.88 m)    Wt 98.1 kg (216 lb 4.3 oz)    SpO2 96%    BMI 27.77 kg/m2       General: Mildly uncomfortable appearing  Head: Normocephalic, without obvious abnormality, atraumatic  Neck: Supple  Lungs: Saturating well on minimal NC  Heart: On significant pressor support, regular rhythm   Abdomen: soft, NT/ND, normal bowel sounds  Extremities: normal, atraumatic, no edema  Neurologic: Alert and oriented    Imaging Results         X-Ray Chest AP Portable (Final result) Result time:  02/11/17 08:49:31    Final result by Jose Feliciano MD (02/11/17 08:49:31)    Impression:        Extubation. Otherwise no significant change in appearance of the chest.      Electronically signed by: JOSE FELICIANO MD  Date:     02/11/17  Time:    08:49     Narrative:    PORTABLE AP CHEST:      Comparison: None.    Findings:     Endotracheal and enteric tubes have been removed. Remaining central lines are unchanged. " Appears the chest is not significantly change with retrocardiac opacity. The cardiac silhouette isunchanged in size.  There are no acute bony abnormalities.            X-Ray Chest 1 View (Final result) Result time:  02/10/17 06:22:14    Final result by Marjan Tai MD (02/10/17 06:22:14)    Impression:      No significant interval detrimental change compared to most recent radiograph 2/9/2017    ______________________________________     Electronically signed by resident: MARJAN TAI MD  Date:     02/10/17  Time:    06:17            As the supervising and teaching physician, I personally reviewed the images and resident's interpretation and I agree with the findings.          Electronically signed by: HARRY YANG  Date:     02/10/17  Time:    06:22     Narrative:    Time of Procedure: 02/10/17 05:41:22  Accession # [id number]    Comparison: Chest radiograph 02/09/2017    Number of views: 1    Findings:  Distal tip of endotracheal tube projects over the mid trachea 2.5 cm above the dillon.  LEFT IJ [place]-Lenny catheter terminates over the expected location of the pulmonary outflow.  RIGHT IJ terminates over the cavoatrial junction.  RIGHT mediastinal drain in stable position.  Surgical clips project over the RIGHT lower mediastinum.  Postsurgical changes compatible with midline sternotomy and aortic valve replacement.  Cardiac silhouette is enlarged similar to prior.  Mediastinal structures are midline.  No large consolidation or pleural effusion.  No pneumothorax.            X-Ray Chest AP Portable (Final result) Result time:  02/09/17 08:13:05    Final result by Sophia Elias MD (02/09/17 08:13:05)    Impression:      Satisfactory postoperative chest radiograph with sternal sutures well aligned.       Electronically signed by: Sophia Elias MD  Date:     02/09/17  Time:    08:13     Narrative:    Time of Procedure: 01/25/12 07:09:00  Accession # [id number]    Comparison: 2/8/2017, 1909 hrs.    Number of  views: 2.     Findings:  See below.            X-Ray Chest AP Portable (Final result) Result time:  02/08/17 21:27:31    Final result by Jose Feliciano MD (02/08/17 21:27:31)    Impression:        Support devices as above and recent TAVR without evidence of complication.      Electronically signed by: JOSE FELICIANO MD  Date:     02/08/17  Time:    21:27     Narrative:    PORTABLE AP CHEST:      Comparison: CT T./7/17    Findings:     Aortic valve replacement. Right IJ central line terminates over the right atrium. Second IJ central line terminates over the SVC [place]-Lenny catheter terminates over the pulmonary outflow. Mediastinal drain in place. Bilateral chest tubes.No focal consolidations. There is no pleural effusion or pneumothorax. The cardiac silhouette isenlarged in size.  There are no acute bony abnormalities.            CT Chest Without Contrast (Final result) Result time:  02/08/17 10:15:53    Final result by Pranav Tai MD (02/08/17 10:15:53)    Impression:       1. Aorta maintains normal caliber, contour and course.  Calcification of the aortic valve is present.  There is also bulky calcification in the cardiac fibrous skeleton at the junction of aortic and mitral bowels.  LEFT ventricle and RIGHT atrium appear enlarged but detail is limited on this study performed without intravenous contrast medium and without gating.    2. Retrosternal structures were reviewed in this patient with planned repeat sternotomy.      - There are seven sternal sutures, the first three at the level of the manubrium.      - There is minimal fat  the posterior table of the manubrium from the anterior margin of the LEFT innominate vein.      - At least 1 cm of fat separates the sternomanubrial junction from the anterior wall of the distal ascending aorta.  The aorta remains well removed from the posterior table of the sternum inferiorly.      - However the anterior wall of the RIGHT ventricle is  closely opposed to the posterior table of the sternum between the fourth and fifth sternal sutures extending inferiorly past the seventh sternal suture.  This finding is especially well seen on sagittal series 602 image 39.    3. Solitary 0.3 cm pulmonary nodule within the posterior basal aspect of the left lower lobe (axial series 2, image 32). Per Fleischner Society guidelines; in a low risk patient, no follow up is needed. In a high risk patient (e.g. history of smoking or malignancy), those guidelines recommend 12 month CT chest follow up to assess for stability 2/2018.    4. Dilated hepatic veins suggest elevated RIGHT heart pressures, tricuspid insufficiency or both.    5. Moderate volume abdominal ascites which is incompletely visualized.  Etiology of the intraperitoneal fluid is unclear aerated may be related to elevated RIGHT heart pressures.  ______________________________________     Electronically signed by resident: MINERVA LLOYD MD  Date:     02/08/17  Time:    09:33            As the supervising and teaching physician, I personally reviewed the images and resident's interpretation and I agree with the findings.          Electronically signed by: Sophia Elias MD  Date:     02/08/17  Time:    10:15     Narrative:    Time of Procedure: 02/07/17 19:35:34  Accession # [id number]    Reason for exam: Preoperative aortic valve replacement, evaluate aorta    Comparison: Chest radiograph 2/3/2017, 0849 hrs.    Technique:   The chest was surveyed from the apices through the costophrenic angles.  Data was reconstructed at 5-mm increments for contiguous 5-mm images in the axial, sagittal and coronal planes and post processed for extraction of 1.25-mm images at 10-mm increments and for 8 mm maximal-intensity (MIP) images at 2 mm increments confined to the axial plane.  No additional radiation was employed.      Findings:   Devices:  1.  Midline sternotomy wires appear intact with sternal fragments well opposed.   These wires are small and slender indicating sternotomy in childhood.  2. RIGHT IJ central venous catheter and RIGHT subclavian trialysis catheter terminate in the cephalad portion of the RIGHT atrium.      Retrosternal structures were reviewed in this patient with planned repeat sternotomy.      - There are seven sternal sutures, the first three at the level of the manubrium.      - There is minimal fat  the posterior table of the manubrium from the anterior margin of the LEFT innominate vein.      - At least 1 cm of fat separates the sternomanubrial junction from the anterior wall of the distal ascending aorta.  The aorta remains well removed from the posterior table of the sternum inferiorly.      - However the anterior wall of the RIGHT ventricle including RVOT is closely opposed to the posterior table of the sternum.  The cephalad aspect of this close apposition lies between the fourth and fifth sternal sutures at the RVOT.  The RV remains closely applied to the posterior sternal table inferiorly past the seventh  and most distal sternal suture.  This finding is especially well seen on sagittal series 602 image 39.      There is no convincing evidence of abnormality at the base of the neck.      Blood pool is lower attenuation than the myocardium (32 Hounsfield units versus 41 Hounsfield units) consistent with anemia.  Although no intravenous contrast medium or cardiac gating was used we suspect enlargement of the LEFT ventricle and RIGHT atrium.  There is no pleural or pericardial fluid and no pleural or pericardial calcification.    There is left-sided arch with 3 branch vessels.      There is radiopaque material in semicircular distribution at the anterior and lateral wall of the very proximal ascending aorta or aortic valve annulus (axial series 2 images 25 through 27). Calcification is present in the aortic valve.  There is also a focus of bulky calcification in the fibrous skeleton of the heart  at the junction of aortic and mitral valves (axial series 2 images 30 and 31).    We detect no calcific atherosclerosis in the aorta, coronary arteries or other aortic branches.    Measurements of the aorta are limited by the absence of intravenous contrast medium and by transmitted cardiac motion.  Aortic measurements on this noncontrast  study are as follows:    - Mid AAo = 3.8 cm   (axial series 2 image 20); this is less well seen on coronal images because of artifact associated with the catheter in the SVC.    - Distal AAo = 3.7 cm   (sagittal series 602 image 37)    - Aortic arch = 3.7 cm (axial series 2 image 14).    - Isthmus = 3.1 cm   (sagittal series 602 image 44)    - Prox D'g Ao = 3.0 cm   (sagittal series 602 image 41)    - Distal D'g Ao = 3.0 cm   (sagittal series 602 image 39)    Pulmonary arteries have normal caliber, contour and distribution.  There are four pulmonary veins.  Systemic and pulmonary veno atrial connections are concordant.      There is no lymph node enlargement.    Esophagus maintains normal caliber and course.  The hepatic veins are dilated as might occur with tricuspid insufficiency, elevated right heart pressure causing elevated systemic venous pressure, or both. There is significant volume of ascites incompletely visualized.  There is no bowel distension, free air, biliary dilatation or other significant abnormality involving structures in the upper abdomen.      Osseous structures demonstrate age-appropriate degenerative changes with no fracture or lytic or blastic lesion.  As noted above the patient has undergone remote sternotomy, likely in childhood.  Sternal fragments are united.  Seven sternal wires, three at the manubrium, are intact.    Extrathoracic soft tissues are unremarkable in this slender individual.     Tracheobronchial tree reveals no significant abnormality.    The lungs are symmetrically expanded. There is a 0.3 cm solitary pulmonary nodule within the posterior  basal segment of the left lower lobe (axial series 2, image 32).  If the patient has a history of smoking or other significant factor, the Fleischner Society guidelines recommend surveillance with repeat noncontrast chest CT at twelve months (2/2018).  In the absence of such increased risk, those guidelines recommend no further assessment.    Lungs are otherwise clear.            X-Ray Chest 1 View (Final result) Result time:  02/03/17 09:30:20    Final result by Haja Maldonado Jr., MD (02/03/17 09:30:20)    Narrative:    Chest single view compared to July 2016.  Right-sided dialysis catheter overlies the SVC.  There is also a right central venous catheter in place.  Heart is enlarged.  Lungs are fairly well aerated.  No pneumothorax.    Impression see above      Electronically signed by: HAJA MALDONADO MD  Date:     02/03/17  Time:    09:30                 Assessment/Plan  47 y.o.yo male s/p REPLACEMENT-VALVE-AORTIC (N/A), REDO STERNOTOMY WITH AORTIC VALVE REPLACEMENT/redo sternotomy (N/A), REPAIR-VALVE-TRICUSPID on 2/8/17    Neuro: alert and oriented  - continue po pain control    CV: s/p aortic valve replacement and tricuspid valve repair,   - start po BP meds to wean off cardene    Pulm: On minimal vent settings, saturating well, gas normal  No results for input(s): PH, PCO2, PO2, HCO3, POCSATURATED, BE in the last 24 hours.-   pulm    Renal: Minimal uop, pt is HD dependent (ESRD)  Recent Labs      02/11/17   0414   BUN  19  19   CREATININE  2.6*  2.6*   - CRRT performed yesterday, pt tolerated well  - Nephrology following, appreciate recs    Hem/ID: afebrile, no leukocytosis, h/h stable  Recent Labs      02/11/17   0414   HGB  8.6*  8.6*   HCT  25.4*  25.4*   WBC  10.84   - trend labs    Endocrine: euglycemic off of insulin gtt    FEN/GI: npo  - no significant electrolyte abnormalities   - trend labs    Dispo: Continue icu care today, likely step down tomorrow      Jose Viramontes MD PGY II  [phone]

## 2017-02-11 NOTE — PT/OT/SLP EVAL
"Occupational Therapy  Evaluation    Isidro Lozano Jr [place]   MRN: [id number]   Admitting Diagnosis: Nonrheumatic aortic valve stenosis    OT Date of Treatment: 17   OT Start Time: 1445  OT Stop Time: 1515  OT Total Time (min): 30 min    Billable Minutes:  Evaluation 30    Diagnosis: Nonrheumatic aortic valve stenosis   Pt was t/f from [facility]. Pt is s/p AVR, TVR and redosternotomy    Past Medical History   Diagnosis Date    CKD (chronic kidney disease) stage 3, GFR 30-59 ml/min     Hypertension     Tachycardia       Past Surgical History   Procedure Laterality Date    Cardiac surgery         General Precautions: Standard, fall, sternal  Orthopedic Precautions: N/A    Patient History:  Living Environment  Lives With: sibling(s)  Living Arrangements: house  Pt lives with brother who works daily but his sister will be able to assist.  Pt was independent PTA and working as . Pt's home is one story with no LAKE. Pt has no DME.      Subjective:  Communicated with nsg prior to session.  "It hurts" pt states when coughing.     Pain Ratin/10        Location:  (back)  Pain Addressed: Reposition, Distraction, Nurse notified       Objective:   Pt found seated in b/s chair with fly present/    Cognitive Exam:  Oriented to: Person, Place, Time and Situation  Follows Commands/attention: Follows one-step commands  Communication: clear/fluent with low volume  Pt with flat affect and no eye contact with OT.     Physical Exam:  Pt is right UE dominant and demo WFL B UE strength/ROm within sternal precautions. Pt with intact light touch sensation and intact GM/FM coordination.     Functional Mobility:  Sit>Supine: MAX A     Transfers:  Sit <> Stand Assistance: Minimum Assistance  Sit <> Stand Assistive Device: No Assistive Device  Bed <> Chair Technique: Stand Pivot  Bed <> Chair Transfer Assistance: Contact Guard Assistance      Activities of Daily Living:       UE Dressing Level of Assistance: Maximum " "assistance  LE Dressing Level of Assistance: Total assistance  Grooming Position: Seated  Grooming Level of Assistance: Supervision      Pt verb 1/3 sternal precautions, OT provided additional education to pt re: sternal precautions and implications on functional activity. Pt verb understanding and demo good understanding   AM-PAC 6 CLICK ADL  How much help from another person does this patient currently need?  1 = Unable, Total/Dependent Assistance  2 = A lot, Maximum/Moderate Assistance  3 = A little, Minimum/Contact Guard/Supervision  4 = None, Modified [place]/Independent    Putting on and taking off regular lower body clothing? : 1  Bathing (including washing, rinsing, drying)?: 2  Toileting, which includes using toilet, bedpan, or urinal? : 2  Putting on and taking off regular upper body clothing?: 2  Taking care of personal grooming such as brushing teeth?: 3  Eating meals?: 1  Total Score: 11    AM-PAC Raw Score CMS "G-Code Modifier Level of Impairment Assistance   6 % Total / Unable   7 - 9 CM 80 - 100% Maximal Assist   10 - 14 CL 60 - 80% Moderate Assist   15 - 19 CK 40 - 60% Moderate Assist   20 - 22 CJ 20 - 40% Minimal Assist   23 CI 1-20% SBA / CGA   24 CH 0% Independent/ Mod I       Patient left supine with all lines intact, call button in reach and fly and nsg present    Assessment:  Isidro White is a 47 y.o. male with a medical diagnosis of Nonrheumatic aortic valve stenosis and s/p redosternotomy, AVR and TVR. Pt tolerated session fairly well. OT anticipates pt will be ready to d/c home with fly assist when medically stable. Pt to benefit from cont OT to address stated goals.    Rehab identified problem list/impairments: Rehab identified problem list/impairments: weakness, impaired sensation, impaired self care skills, impaired endurance, impaired functional mobilty, pain    Rehab potential is good.    Activity tolerance: Good    Discharge recommendations: Discharge " Facility/Level Of Care Needs: home with home health       GOALS:   Occupational Therapy Goals        Problem: Occupational Therapy Goal    Goal Priority Disciplines Outcome Interventions   Occupational Therapy Goal     OT, PT/OT     Description:  Goals to be met by: 7 days 2/18/17     Patient will increase functional independence with ADLs by performing:    UE Dressing with Set-up Assistance.  LE Dressing with Supervision.  Grooming while standing with Supervision.  Toileting from toilet with Supervision for hygiene and clothing management.   Stand pivot transfers with Supervision.  Toilet transfer to toilet with Supervision.                PLAN:  Patient to be seen 5 x/week to address the above listed problems via self-care/home management, therapeutic exercises  Plan of Care expires:    Plan of Care reviewed with: patient         DOMINICK Singh  02/11/2017

## 2017-02-11 NOTE — PLAN OF CARE
Problem: Occupational Therapy Goal  Goal: Occupational Therapy Goal  Goals to be met by: 7 days 2/18/17     Patient will increase functional independence with ADLs by performing:    UE Dressing with Set-up Assistance.  LE Dressing with Supervision.  Grooming while standing with Supervision.  Toileting from toilet with Supervision for hygiene and clothing management.   Stand pivot transfers with Supervision.  Toilet transfer to toilet with Supervision.  Goals and POC established today.

## 2017-02-12 LAB
ALBUMIN SERPL BCP-MCNC: 2.4 G/DL
ALBUMIN SERPL BCP-MCNC: 2.5 G/DL
ALBUMIN SERPL BCP-MCNC: 2.5 G/DL
ANION GAP SERPL CALC-SCNC: 8 MMOL/L
ANION GAP SERPL CALC-SCNC: 8 MMOL/L
ANION GAP SERPL CALC-SCNC: 9 MMOL/L
APTT BLDCRRT: 55.8 SEC
APTT BLDCRRT: 58.8 SEC
APTT BLDCRRT: 62.7 SEC
APTT BLDCRRT: 64.3 SEC
APTT BLDCRRT: 69 SEC
BUN SERPL-MCNC: 10 MG/DL
BUN SERPL-MCNC: 22 MG/DL
BUN SERPL-MCNC: 7 MG/DL
CALCIUM SERPL-MCNC: 8.3 MG/DL
CHLORIDE SERPL-SCNC: 105 MMOL/L
CHLORIDE SERPL-SCNC: 106 MMOL/L
CHLORIDE SERPL-SCNC: 106 MMOL/L
CO2 SERPL-SCNC: 23 MMOL/L
CO2 SERPL-SCNC: 23 MMOL/L
CO2 SERPL-SCNC: 24 MMOL/L
CREAT SERPL-MCNC: 1.1 MG/DL
CREAT SERPL-MCNC: 1.5 MG/DL
CREAT SERPL-MCNC: 2.7 MG/DL
EST. GFR  (AFRICAN AMERICAN): 31.3 ML/MIN/1.73 M^2
EST. GFR  (AFRICAN AMERICAN): >60 ML/MIN/1.73 M^2
EST. GFR  (AFRICAN AMERICAN): >60 ML/MIN/1.73 M^2
EST. GFR  (NON AFRICAN AMERICAN): 27 ML/MIN/1.73 M^2
EST. GFR  (NON AFRICAN AMERICAN): 55 ML/MIN/1.73 M^2
EST. GFR  (NON AFRICAN AMERICAN): >60 ML/MIN/1.73 M^2
GLUCOSE SERPL-MCNC: 101 MG/DL
GLUCOSE SERPL-MCNC: 111 MG/DL
GLUCOSE SERPL-MCNC: 95 MG/DL
INR PPP: 1.4
MAGNESIUM SERPL-MCNC: 1.7 MG/DL
MAGNESIUM SERPL-MCNC: 2 MG/DL
MAGNESIUM SERPL-MCNC: 2 MG/DL
PHOSPHATE SERPL-MCNC: 2.4 MG/DL
PHOSPHATE SERPL-MCNC: 3.2 MG/DL
PHOSPHATE SERPL-MCNC: 3.2 MG/DL
POTASSIUM SERPL-SCNC: 4.4 MMOL/L
POTASSIUM SERPL-SCNC: 4.5 MMOL/L
POTASSIUM SERPL-SCNC: 4.5 MMOL/L
PROTHROMBIN TIME: 14 SEC
SODIUM SERPL-SCNC: 137 MMOL/L
SODIUM SERPL-SCNC: 137 MMOL/L
SODIUM SERPL-SCNC: 138 MMOL/L

## 2017-02-12 PROCEDURE — 25000003 PHARM REV CODE 250: Performed by: INTERNAL MEDICINE

## 2017-02-12 PROCEDURE — 25000242 PHARM REV CODE 250 ALT 637 W/ HCPCS: Performed by: THORACIC SURGERY (CARDIOTHORACIC VASCULAR SURGERY)

## 2017-02-12 PROCEDURE — 25000003 PHARM REV CODE 250: Performed by: NURSE PRACTITIONER

## 2017-02-12 PROCEDURE — 90945 DIALYSIS ONE EVALUATION: CPT

## 2017-02-12 PROCEDURE — 94640 AIRWAY INHALATION TREATMENT: CPT

## 2017-02-12 PROCEDURE — 25000003 PHARM REV CODE 250: Performed by: STUDENT IN AN ORGANIZED HEALTH CARE EDUCATION/TRAINING PROGRAM

## 2017-02-12 PROCEDURE — C9113 INJ PANTOPRAZOLE SODIUM, VIA: HCPCS | Performed by: THORACIC SURGERY (CARDIOTHORACIC VASCULAR SURGERY)

## 2017-02-12 PROCEDURE — 83735 ASSAY OF MAGNESIUM: CPT

## 2017-02-12 PROCEDURE — 85730 THROMBOPLASTIN TIME PARTIAL: CPT | Mod: 91

## 2017-02-12 PROCEDURE — 85730 THROMBOPLASTIN TIME PARTIAL: CPT

## 2017-02-12 PROCEDURE — 25000003 PHARM REV CODE 250: Performed by: THORACIC SURGERY (CARDIOTHORACIC VASCULAR SURGERY)

## 2017-02-12 PROCEDURE — 63600175 PHARM REV CODE 636 W HCPCS: Performed by: STUDENT IN AN ORGANIZED HEALTH CARE EDUCATION/TRAINING PROGRAM

## 2017-02-12 PROCEDURE — 83735 ASSAY OF MAGNESIUM: CPT | Mod: 91

## 2017-02-12 PROCEDURE — 80069 RENAL FUNCTION PANEL: CPT | Mod: 91

## 2017-02-12 PROCEDURE — 25000003 PHARM REV CODE 250

## 2017-02-12 PROCEDURE — 99233 SBSQ HOSP IP/OBS HIGH 50: CPT | Mod: ,,, | Performed by: SURGERY

## 2017-02-12 PROCEDURE — 80100008 HC CRRT DAILY MAINTENANCE

## 2017-02-12 PROCEDURE — 80069 RENAL FUNCTION PANEL: CPT

## 2017-02-12 PROCEDURE — 27100171 HC OXYGEN HIGH FLOW UP TO 24 HOURS

## 2017-02-12 PROCEDURE — 20000000 HC ICU ROOM

## 2017-02-12 PROCEDURE — 27000221 HC OXYGEN, UP TO 24 HOURS

## 2017-02-12 PROCEDURE — 90945 DIALYSIS ONE EVALUATION: CPT | Mod: ,,, | Performed by: INTERNAL MEDICINE

## 2017-02-12 PROCEDURE — 63600175 PHARM REV CODE 636 W HCPCS: Performed by: INTERNAL MEDICINE

## 2017-02-12 PROCEDURE — 94761 N-INVAS EAR/PLS OXIMETRY MLT: CPT

## 2017-02-12 PROCEDURE — 94799 UNLISTED PULMONARY SVC/PX: CPT

## 2017-02-12 PROCEDURE — 99900035 HC TECH TIME PER 15 MIN (STAT)

## 2017-02-12 PROCEDURE — 85610 PROTHROMBIN TIME: CPT

## 2017-02-12 PROCEDURE — 63600175 PHARM REV CODE 636 W HCPCS: Performed by: THORACIC SURGERY (CARDIOTHORACIC VASCULAR SURGERY)

## 2017-02-12 RX ORDER — FUROSEMIDE 10 MG/ML
80 INJECTION INTRAMUSCULAR; INTRAVENOUS 2 TIMES DAILY
Status: DISCONTINUED | OUTPATIENT
Start: 2017-02-12 | End: 2017-02-19

## 2017-02-12 RX ORDER — MAGNESIUM SULFATE HEPTAHYDRATE 40 MG/ML
2 INJECTION, SOLUTION INTRAVENOUS
Status: DISCONTINUED | OUTPATIENT
Start: 2017-02-12 | End: 2017-02-13

## 2017-02-12 RX ORDER — HYDRALAZINE HYDROCHLORIDE 50 MG/1
100 TABLET, FILM COATED ORAL EVERY 8 HOURS
Status: DISCONTINUED | OUTPATIENT
Start: 2017-02-12 | End: 2017-02-20

## 2017-02-12 RX ORDER — HYDRALAZINE HYDROCHLORIDE 20 MG/ML
10 INJECTION INTRAMUSCULAR; INTRAVENOUS ONCE
Status: COMPLETED | OUTPATIENT
Start: 2017-02-12 | End: 2017-02-12

## 2017-02-12 RX ORDER — CLONIDINE HYDROCHLORIDE 0.1 MG/1
0.1 TABLET ORAL NIGHTLY
Status: DISCONTINUED | OUTPATIENT
Start: 2017-02-12 | End: 2017-02-13

## 2017-02-12 RX ORDER — FUROSEMIDE 10 MG/ML
60 INJECTION INTRAMUSCULAR; INTRAVENOUS 2 TIMES DAILY
Status: DISCONTINUED | OUTPATIENT
Start: 2017-02-12 | End: 2017-02-12

## 2017-02-12 RX ORDER — LABETALOL HYDROCHLORIDE 5 MG/ML
10 INJECTION, SOLUTION INTRAVENOUS ONCE
Status: COMPLETED | OUTPATIENT
Start: 2017-02-12 | End: 2017-02-12

## 2017-02-12 RX ORDER — HYDROCODONE BITARTRATE AND ACETAMINOPHEN 500; 5 MG/1; MG/1
TABLET ORAL
Status: DISCONTINUED | OUTPATIENT
Start: 2017-02-12 | End: 2017-02-13

## 2017-02-12 RX ORDER — LABETALOL 100 MG/1
100 TABLET, FILM COATED ORAL EVERY 8 HOURS
Status: DISCONTINUED | OUTPATIENT
Start: 2017-02-12 | End: 2017-02-12

## 2017-02-12 RX ORDER — LABETALOL HYDROCHLORIDE 5 MG/ML
INJECTION, SOLUTION INTRAVENOUS
Status: COMPLETED
Start: 2017-02-12 | End: 2017-02-12

## 2017-02-12 RX ORDER — LABETALOL 300 MG/1
300 TABLET, FILM COATED ORAL EVERY 8 HOURS
Status: DISCONTINUED | OUTPATIENT
Start: 2017-02-12 | End: 2017-02-21 | Stop reason: HOSPADM

## 2017-02-12 RX ORDER — LABETALOL HYDROCHLORIDE 5 MG/ML
10 INJECTION, SOLUTION INTRAVENOUS EVERY 6 HOURS PRN
Status: DISCONTINUED | OUTPATIENT
Start: 2017-02-12 | End: 2017-02-21

## 2017-02-12 RX ADMIN — CLONIDINE HYDROCHLORIDE 0.1 MG: 0.1 TABLET ORAL at 09:02

## 2017-02-12 RX ADMIN — IPRATROPIUM BROMIDE AND ALBUTEROL SULFATE 3 ML: .5; 3 SOLUTION RESPIRATORY (INHALATION) at 04:02

## 2017-02-12 RX ADMIN — IPRATROPIUM BROMIDE AND ALBUTEROL SULFATE 3 ML: .5; 3 SOLUTION RESPIRATORY (INHALATION) at 08:02

## 2017-02-12 RX ADMIN — OXYCODONE HYDROCHLORIDE 10 MG: 5 TABLET ORAL at 10:02

## 2017-02-12 RX ADMIN — MORPHINE SULFATE 3 MG: 2 INJECTION, SOLUTION INTRAMUSCULAR; INTRAVENOUS at 03:02

## 2017-02-12 RX ADMIN — LABETALOL HYDROCHLORIDE 10 MG: 5 INJECTION, SOLUTION INTRAVENOUS at 10:02

## 2017-02-12 RX ADMIN — SODIUM PHOSPHATE, MONOBASIC, MONOHYDRATE 30 MMOL: 276; 142 INJECTION, SOLUTION INTRAVENOUS at 08:02

## 2017-02-12 RX ADMIN — HEPARIN SODIUM AND DEXTROSE 1800 UNITS/HR: 10000; 5 INJECTION INTRAVENOUS at 10:02

## 2017-02-12 RX ADMIN — NICARDIPINE HYDROCHLORIDE 15 MG/HR: 0.2 INJECTION, SOLUTION INTRAVENOUS at 06:02

## 2017-02-12 RX ADMIN — IPRATROPIUM BROMIDE AND ALBUTEROL SULFATE 3 ML: .5; 3 SOLUTION RESPIRATORY (INHALATION) at 12:02

## 2017-02-12 RX ADMIN — CHLORHEXIDINE GLUCONATE 15 ML: 1.2 RINSE ORAL at 09:02

## 2017-02-12 RX ADMIN — SILDENAFIL 20 MG: 20 TABLET ORAL at 06:02

## 2017-02-12 RX ADMIN — HYDRALAZINE HYDROCHLORIDE 10 MG: 20 INJECTION INTRAMUSCULAR; INTRAVENOUS at 02:02

## 2017-02-12 RX ADMIN — FUROSEMIDE 80 MG: 10 INJECTION, SOLUTION INTRAMUSCULAR; INTRAVENOUS at 05:02

## 2017-02-12 RX ADMIN — HYDRALAZINE HYDROCHLORIDE 10 MG: 20 INJECTION INTRAMUSCULAR; INTRAVENOUS at 08:02

## 2017-02-12 RX ADMIN — CHLORHEXIDINE GLUCONATE 15 ML: 1.2 RINSE ORAL at 08:02

## 2017-02-12 RX ADMIN — OXYCODONE HYDROCHLORIDE AND ACETAMINOPHEN 500 MG: 500 TABLET ORAL at 09:02

## 2017-02-12 RX ADMIN — HYDRALAZINE HYDROCHLORIDE 100 MG: 50 TABLET ORAL at 09:02

## 2017-02-12 RX ADMIN — IPRATROPIUM BROMIDE AND ALBUTEROL SULFATE 3 ML: .5; 3 SOLUTION RESPIRATORY (INHALATION) at 03:02

## 2017-02-12 RX ADMIN — IPRATROPIUM BROMIDE AND ALBUTEROL SULFATE 3 ML: .5; 3 SOLUTION RESPIRATORY (INHALATION) at 11:02

## 2017-02-12 RX ADMIN — AMLODIPINE BESYLATE 10 MG: 10 TABLET ORAL at 08:02

## 2017-02-12 RX ADMIN — NICARDIPINE HYDROCHLORIDE 15 MG/HR: 0.2 INJECTION, SOLUTION INTRAVENOUS at 08:02

## 2017-02-12 RX ADMIN — FUROSEMIDE 80 MG: 10 INJECTION, SOLUTION INTRAMUSCULAR; INTRAVENOUS at 12:02

## 2017-02-12 RX ADMIN — SILDENAFIL 20 MG: 20 TABLET ORAL at 09:02

## 2017-02-12 RX ADMIN — NICARDIPINE HYDROCHLORIDE 10 MG/HR: 0.2 INJECTION, SOLUTION INTRAVENOUS at 04:02

## 2017-02-12 RX ADMIN — HYDRALAZINE HYDROCHLORIDE 100 MG: 50 TABLET ORAL at 01:02

## 2017-02-12 RX ADMIN — NICARDIPINE HYDROCHLORIDE 15 MG/HR: 0.2 INJECTION, SOLUTION INTRAVENOUS at 09:02

## 2017-02-12 RX ADMIN — OXYCODONE HYDROCHLORIDE AND ACETAMINOPHEN 500 MG: 500 TABLET ORAL at 08:02

## 2017-02-12 RX ADMIN — HYDRALAZINE HYDROCHLORIDE 10 MG: 20 INJECTION INTRAMUSCULAR; INTRAVENOUS at 03:02

## 2017-02-12 RX ADMIN — SILDENAFIL 20 MG: 20 TABLET ORAL at 01:02

## 2017-02-12 RX ADMIN — LABETALOL HYDROCHLORIDE 10 MG: 5 INJECTION, SOLUTION INTRAVENOUS at 07:02

## 2017-02-12 RX ADMIN — WARFARIN SODIUM 3 MG: 2 TABLET ORAL at 05:02

## 2017-02-12 RX ADMIN — LABETALOL HCL 100 MG: 100 TABLET, FILM COATED ORAL at 01:02

## 2017-02-12 RX ADMIN — MORPHINE SULFATE 3 MG: 2 INJECTION, SOLUTION INTRAMUSCULAR; INTRAVENOUS at 07:02

## 2017-02-12 RX ADMIN — NICARDIPINE HYDROCHLORIDE 15 MG/HR: 0.2 INJECTION, SOLUTION INTRAVENOUS at 04:02

## 2017-02-12 RX ADMIN — ASPIRIN 81 MG: 81 TABLET, COATED ORAL at 08:02

## 2017-02-12 RX ADMIN — OXYCODONE HYDROCHLORIDE 10 MG: 5 TABLET ORAL at 05:02

## 2017-02-12 RX ADMIN — LABETALOL HCL 300 MG: 300 TABLET, FILM COATED ORAL at 09:02

## 2017-02-12 RX ADMIN — MAGNESIUM SULFATE IN WATER 2 G: 40 INJECTION, SOLUTION INTRAVENOUS at 06:02

## 2017-02-12 RX ADMIN — NICARDIPINE HYDROCHLORIDE 15 MG/HR: 0.2 INJECTION, SOLUTION INTRAVENOUS at 01:02

## 2017-02-12 RX ADMIN — PANTOPRAZOLE SODIUM 40 MG: 40 INJECTION, POWDER, FOR SOLUTION INTRAVENOUS at 08:02

## 2017-02-12 RX ADMIN — POLYETHYLENE GLYCOL 3350 17 G: 17 POWDER, FOR SOLUTION ORAL at 08:02

## 2017-02-12 NOTE — PROGRESS NOTES
Progress Note  Surgical Intensive Care    Admit Date: 2/2/2017  Post-operative Day: 4 Days Post-Op  Hospital Day: 11    SUBJECTIVE:     Follow-up For:  Procedure(s) (LRB):  REPLACEMENT-VALVE-AORTIC (N/A)  REDO STERNOTOMY WITH AORTIC VALVE REPLACEMENT/redo sternotomy (N/A)  REPAIR-VALVE-TRICUSPID    HPI: Patient is a 47 y.o. male with h/o ASD repair at 7 yo, AS with CHF wirh rEF, pulmonary HTN, CKDIII, active hep B transferred from [facility] for surgery eval. Recently admitted to OSH for SVT, HTN, CHF exacerbation 1/23 and discharged on coreg, clonidine, verapamil, readmitted to OSH 1/25 with chest pain and found to be in heart block which was thought to be medication induced. Pt came out of SVT, out of ICU. He had worsening of his CKD and had a tunneled RIJ line placed for HD. Pt reports still making urine. Patient underwent aortic valve replacement that was remarkable for mild difficulty going off pump due to systolic dysfunction. Patient arrived to SICU intubated on epinephrine, milrinone, norepinephrine and vasopressin and propofol and fentanyl.     Interval history: NAEON. Maintaining MAP>60. Remains on 55% FiO2 via NC and on cardene gtt.     Continuous Infusions:   sodium chloride 0.45% 10 mL/hr (02/09/17 0800)    heparin (porcine) in 5 % dex 1,800 Units/hr (02/12/17 0900)    nicardipine 15 mg/hr (02/12/17 0900)     Scheduled Meds:   albuterol-ipratropium 2.5mg-0.5mg/3mL  3 mL Nebulization Q4H    amlodipine  10 mg Oral Daily    ascorbic acid (vitamin C)  500 mg Oral BID    aspirin  81 mg Oral Daily    chlorhexidine  15 mL Mouth/Throat BID    epoetin vikki (PROCRIT) injection  10,000 Units Intravenous Every Mon, Wed, Fri    ferric gluconate (FERRLECIT) IVPB  125 mg Intravenous Every Mon, Wed, Fri    labetalol  10 mg Intravenous Once    pantoprazole  40 mg Intravenous Daily    polyethylene glycol  17 g Per G Tube Daily    sildenafil  20 mg Oral TID    warfarin  3 mg Oral Daily     PRN  Meds:sodium chloride 0.9%, sodium chloride, sodium chloride, acetaminophen, dextrose 50%, glucagon (human recombinant), heparin (porcine), hydrALAZINE, insulin aspart, lactulose, magnesium sulfate IVPB, metoclopramide HCl, morphine, ondansetron, oxycodone, oxycodone, sodium phosphate IVPB, sodium phosphate IVPB, sodium phosphate IVPB    Review of patient's allergies indicates:  No Known Allergies    OBJECTIVE:     Vital Signs (Most Recent)  Temp: 98.5 °F (36.9 °C) (02/12/17 0700)  Pulse: 107 (02/12/17 0900)  Resp: (!) 21 (02/12/17 0900)  BP: (!) 135/21 (02/12/17 0900)  SpO2: (!) 91 % (02/12/17 0900)    Vital Signs Range (Last 24H):  Temp:  [98.1 °F (36.7 °C)-99 °F (37.2 °C)]   Pulse:  []   Resp:  [6-22]   BP: (110-142)/(21-80)   SpO2:  [90 %-100 %]   Arterial Line BP: (115-139)/(57-74)     I & O (Last 24H):    Intake/Output Summary (Last 24 hours) at 02/12/17 1008  Last data filed at 02/12/17 0900   Gross per 24 hour   Intake           2814.2 ml   Output             4774 ml   Net          -1959.8 ml     Ventilator Data (Last 24H):          Hemodynamic Parameters (Last 24H):       Physical Exam:  General: well developed, well nourished, awake, alert, no distress   HENT: Head:normocephalic, atraumatic.  Eyes: conjunctivae/corneas clear. PERRL.   Neck: supple, symmetrical, trachea midline, no JVD and thyroid not enlarged, symmetric, no tenderness/mass/nodules  Lungs: clear to auscultation bilaterally and normal respiratory effort  Cardiovascular: Heart: tachycardic with regular rhythm, S1, S2 normal, no murmur, click, rub or gallop. Chest Wall: sternotomy dressing C/D/I. Extremities: no cyanosis or edema, or clubbing. Pulses: 2+ and symmetric.  Abdomen/Rectal: Abdomen: soft, non-tender non-distented; bowel sounds normal; no masses, no organomegaly.   Skin: Skin color, texture, turgor normal. No rashes or lesions    Wound/Incision:   clean, dry, intact    Lines/Drains:       Hemodialysis Catheter right subclavian  (Active)   Site Assessment Clean;Dry;Intact;No redness;No swelling;No drainage;No warmth 2/9/2017  3:00 AM   Status Deaccessed 2/9/2017  3:00 AM   Flows Good 2/7/2017  7:47 AM   Dressing Intervention Dressing reinforced 2/6/2017  8:00 AM   Dressing Status Clean;Dry;Intact 2/9/2017  3:00 AM   Dressing Change Due 02/08/17 2/7/2017  8:00 PM   Site Condition No complications 2/9/2017  3:00 AM   Dressing Occlusive 2/9/2017  3:00 AM   Daily Line Review Performed 2/9/2017  3:00 AM   Number of days:            Introducer 02/08/17 left internal jugular (Active)   Specific Qualities Saucier in place 2/9/2017  3:00 AM   Dressing Status Clean;Dry;Intact 2/9/2017  3:00 AM   Dressing Change Due 02/15/17 2/9/2017  3:00 AM   Daily Line Review Performed 2/9/2017  3:00 AM   Number of days:1            Pulmonary Artery Catheter Assessment  02/08/17 0820 (Active)   Dressing biopatch in place;dressing dry and intact 2/9/2017  3:00 AM   Securement secured w/ sutures 2/9/2017  3:00 AM   Current Insertion Depth (cm) 49 cm 2/9/2017  3:00 AM   Phlebitis 0-->no symptoms 2/9/2017  3:00 AM   Infiltration 0-->no symptoms 2/9/2017  3:00 AM   Waveform normal 2/9/2017  3:00 AM   Prox Injectate Status Infusing 2/9/2017  3:00 AM   Prox Infusate Status Infusing 2/9/2017  3:00 AM   Distal Status Infusing 2/9/2017  3:00 AM   Daily Line Review Performed 2/9/2017  3:00 AM   Number of days:0            CVC Triple Lumen - PreSep Cath 01/24/17 right internal jugular (Active)   Site Assessment Clean;Dry;Intact;No redness;No swelling;No drainage;No warmth 2/9/2017  3:00 AM   Proximal Lumen Status Infusing 2/9/2017  3:00 AM   Medial Lumen Status Infusing 2/9/2017  3:00 AM   Distal Lumen Status Infusing 2/9/2017  3:00 AM   Dressing Type Transparent 2/9/2017  3:00 AM   Dressing Status Clean;Dry;Intact;Biopatch in place 2/9/2017  3:00 AM   Dressing Intervention New dressing 2/7/2017  7:47 AM   Dressing Change Due 02/13/17 2/9/2017  3:00 AM   Daily Line Review  Performed 2/9/2017  3:00 AM   Number of days:16            Peripheral IV - Single Lumen 07/12/16 1535 Right Antecubital (Active)   Number of days:211            Peripheral IV - Single Lumen 02/07/17 1809 Left Forearm (Active)   Site Assessment Clean;Dry;Intact;No redness;No swelling;No warmth;No drainage 2/9/2017  3:00 AM   Line Status Flushed;Infusing 2/9/2017  3:00 AM   Dressing Status Clean;Dry;Intact 2/9/2017  3:00 AM   Dressing Intervention New dressing 2/7/2017  6:10 PM   Dressing Change Due 02/11/17 2/9/2017  3:00 AM   Site Change Due 02/11/17 2/8/2017  7:00 PM   Reason Not Rotated Not due 2/9/2017  3:00 AM   Number of days:1            Peripheral IV - Single Lumen 02/08/17 2243 Right Antecubital (Active)   Site Assessment Clean;Dry;Intact;No redness;No swelling;No drainage;No warmth 2/9/2017  3:00 AM   Line Status Blood return noted;Flushed;Infusing 2/9/2017  3:00 AM   Dressing Status Clean;Dry;Intact 2/9/2017  3:00 AM   Dressing Intervention New dressing 2/9/2017  3:00 AM   Dressing Change Due 02/12/17 2/9/2017  3:00 AM   Site Change Due 02/12/17 2/9/2017  3:00 AM   Reason Not Rotated Not due 2/9/2017  3:00 AM   Number of days:0            Arterial Line 02/08/17 0807 Left Radial (Active)   Site Assessment Clean;Dry;Intact;No swelling;No redness;No warmth;No drainage 2/9/2017  3:00 AM   Line Status Pulsatile blood flow 2/9/2017  3:00 AM   Art Line Waveform Appropriate;Square wave test performed 2/9/2017  3:00 AM   Arterial Line Interventions Leveled;Flushed per protocol 2/9/2017  3:00 AM   Color/Movement/Sensation Capillary refill less than 3 sec 2/9/2017  3:00 AM   Dressing Type Transparent 2/9/2017  3:00 AM   Dressing Status Clean;Dry;Intact 2/9/2017  3:00 AM   Dressing Change Due 02/12/17 2/9/2017  3:00 AM   Number of days:0            Pacer Wires 02/08/17 1542 (Active)   Pacer Wire Status Ventricular wires disconnected from pacer 2/9/2017  3:00 AM   Site Assessment Clean;Dry;Intact 2/9/2017  3:00 AM    How Pacer Wires are Secured Ventricular wires secured to dressing 2/9/2017  3:00 AM   Dressing Status Clean;Dry;Intact 2/9/2017  3:00 AM   Number of days:0            NG/OG Tube 02/08/17 1100 (Active)   Placement Check placement verified by x-ray;placement verified by distal tube length measurement 2/9/2017  3:00 AM   pH Aspirate Result 5 2/8/2017  5:30 PM   Tolerance no signs/symptoms of discomfort 2/9/2017  3:00 AM   Securement taped to commercial device 2/9/2017  3:00 AM   Clamp Status/Tolerance unclamped 2/9/2017  3:00 AM   Suction Setting/Drainage Method suction initiated at;low;intermittent setting 2/9/2017  3:00 AM   Insertion Site Appearance no redness, warmth, tenderness, skin breakdown, drainage 2/9/2017  3:00 AM   Drainage Green 2/9/2017  3:00 AM   Flush/Irrigation flushed w/;water;no resistance met 2/9/2017  3:00 AM   Number of days:0            Urethral Catheter 02/08/17 0850 Non-latex;Straight-tip;Temperature probe 16 Fr. (Active)   Site Assessment Clean;Intact 2/9/2017  3:00 AM   Collection Container Urimeter 2/9/2017  3:00 AM   Securement Method secured to top of thigh w/ adhesive device 2/9/2017  3:00 AM   Catheter Care Performed yes 2/9/2017  3:00 AM   Reason for Continuing Urinary Catheterization Post operative 2/9/2017  3:00 AM   CAUTI Prevention Bundle Drainage bag off the floor;No dependent loops or kinks 2/8/2017  5:30 PM   Output (mL) 3 mL 2/9/2017  5:45 AM   Number of days:0            Y Chest Tube 1 and 2 02/08/17 1552 Right Mediastinal 19 Fr. 2 Left Mediastinal 19 Fr. (Active)   Function -20 cm H2O 2/9/2017  3:00 AM   Air Leak/Fluctuation air leak present 2/9/2017  3:00 AM   Safety all connections secured 2/9/2017  3:00 AM   Securement tubing anchored to body distal to insertion site w/ tape 2/9/2017  3:00 AM   Left Subcutaneous Emphysema none present 2/9/2017  3:00 AM   Right Subcutaneous Emphysema none present 2/9/2017  3:00 AM   Patency Intervention Stripped;Tip/tilt 2/9/2017  3:00 AM    Drainage Description 1 Serosanguineous 2/9/2017  3:00 AM   Tube 1 Dressing Appearance occlusive gauze dressing intact;clean and dry 2/9/2017  3:00 AM   Site Assessment 1 Not assessed 2/9/2017  3:00 AM   Surrounding Skin 1 Unable to view 2/9/2017  3:00 AM   Drainage Description 2 Sanguineous 2/9/2017  3:00 AM   Tube 2 Dressing Appearance occlusive gauze dressing intact;clean and dry 2/9/2017  3:00 AM   Site Assessment 2 Not assessed 2/9/2017  3:00 AM   Surrounding Skin Unable to view 2/9/2017  3:00 AM   Output (mL) 6 mL 2/9/2017  5:00 AM   Number of days:0            Y Chest Tube 3 and 4 02/08/17 1553 3 Right Pleural 19 Fr. 4 Left Pleural 19 Fr. (Active)   Function -20 cm H2O 2/9/2017  3:00 AM   Air Leak/Fluctuation air leak not present 2/9/2017  3:00 AM   Safety all tubing connections taped 2/9/2017  3:00 AM   Securement tubing anchored to body distal to insertion site w/ tape 2/9/2017  3:00 AM   Left Subcutaneous Emphysema none present 2/9/2017  3:00 AM   Right Subcutaneous Emphysema none present 2/9/2017  3:00 AM   Patency Intervention Stripped;Tip/tilt 2/9/2017  3:00 AM   Drainage Description 3 Serosanguineous 2/9/2017  3:00 AM   Tube 3 Dressing Appearance occlusive gauze dressing intact;clean and dry 2/9/2017  3:00 AM   Site Assessment 3 Not assessed 2/9/2017  3:00 AM   Surrounding Skin 3 Unable to view 2/9/2017  3:00 AM   Drainage Description 4 Sanguineous 2/9/2017  3:00 AM   Tube 4 Dressing Appearance occlusive gauze dressing intact;clean and dry 2/9/2017  3:00 AM   Site Assessment 4 Not assessed 2/9/2017  3:00 AM   Surrounding Skin 4 Unable to view 2/9/2017  3:00 AM   Output (mL) 10 mL 2/9/2017  5:00 AM   Number of days:0       Laboratory (Last 24H):  CBC:  No results for input(s): WBC, HGB, HCT, PLT in the last 24 hours.  CMP:    Recent Labs  Lab 02/12/17  0407   CALCIUM 8.3*   ALBUMIN 2.5*      K 4.4   CO2 23      BUN 22*   CREATININE 2.7*       Chest X-Ray: Aortic valve replacement. Right IJ  central line terminates over the right atrium. Second IJ central line terminates over the SVC [place] catheter terminates over the pulmonary outflow. Mediastinal drain in place. Bilateral chest tubes.No focal consolidations. There is no pleural effusion or pneumothorax. The cardiac silhouette isenlarged in size.  There are no acute bony abnormalities.    ASSESSMENT/PLAN:         Neuro:   Awake, alert, oriented   No sedation      Pulmonary:   - adequate sats on 55% FiO2       Cardiac:  S/p AVR   CHF   Dramatically improved again today   - swan pulled yesterday   - remains on cardene  - amlodipine started yesterday without much change.      Renal:   Requiring RRT,   - s/p CRRT 12h yesterday   - good UOP with lasix dose.      Infectious Disease:   Active Hep B   Ancef per primary for post Op prophylaxis      Hematology/Oncology:  WBC WNL  H/H  stable 8.0/23.7 --> 8.6/25.4 , CBC not collected today.          Endocrine:  No issues currently, not on insulin gtt   Glu WnL     Fluids/Electrolytes/Nutrition/GI:   NPO currently   Replete electrolytes prn being mindful of renal function         Pain Management:   Dilaudid 1mg q4h prn     Dispo: step down once cardene weaned        Aishwarya Elam MD  CA1   Anesthesiology

## 2017-02-12 NOTE — PLAN OF CARE
Problem: Patient Care Overview  Goal: Plan of Care Review  PHM: CHF, pulmonary HTN, CKD3, Hep B    PSH: ASD repair 7 y/o    Hospital Course:    2/8: Admit SICU Aortic valve replacement, Tricuspid valve repair, Redo sternotomy; 1L albumin, 2 amps bicarb, 2 unit PRBC  2/9: 2 PRBC, 1 amp bicarb, 1 500 5%Albumin, 1-100mL 25 %Albumin Levo/Milrinone off, wean Epi/Vaso, Heparin gtt started    Nursing:  * Accucheck Q6h  *Renal Function Panel q8h  *PTT q6h goal 60-80   Outcome: Ongoing (interventions implemented as appropriate)  Pt awake, alert and oriented. OOBTC with PT for approximately 4 hours with x2 assist. Pt remains on Cardene for MAP goal 60-80. Pt on heparin gtt at 1700 units/hr for PTT 60-80, check PTT q6h. Accu check q6h, stable. [place]/introducer removed. CVP 12-14 throughout shift. Making minimal urine. CRRT tonight for 12 hours for UF goal 350-450. Pt given PO prn pain medication. Denied wanting food, tolerating water. Remains on 8L high flow cannula. Sister calls for updates. Family present at bedside throughout shift. Pt remains free of falls without skin breakdown. Will continue to closely monitor.

## 2017-02-12 NOTE — PROGRESS NOTES
Progress Note  Nephrology    Admit Date: 2/2/2017   LOS: 10 days     SUBJECTIVE:     Follow-up For:  ESRD on HD     Interval follow up:   Pt continues on 8 LPM NC, Lasix 100 mg IV given yest, Pt urinated 1.1 L overnight  On CRRT     OBJECTIVE:     Vital Signs (Most Recent)  Temp: 98.5 °F (36.9 °C) (02/12/17 1500)  Pulse: 98 (02/12/17 1600)  Resp: (!) 22 (02/12/17 1600)  BP: 115/72 (02/12/17 1600)  SpO2: (!) 94 % (02/12/17 1600)    Vital Signs Range (Last 24H):  Temp:  [98.4 °F (36.9 °C)-99 °F (37.2 °C)]   Pulse:  []   Resp:  [0-25]   BP: (110-142)/(21-80)   SpO2:  [86 %-100 %]   Arterial Line BP: (115-139)/(58-74)       Intake/Output Summary (Last 24 hours) at 02/12/17 1720  Last data filed at 02/12/17 1700   Gross per 24 hour   Intake           2814.2 ml   Output             9191 ml   Net          -6376.8 ml     Physical Exam:  General: Well developed, well nourished  HEENT: Conjunctiva clear; EOMs clear   Neck: No JVD noted, Supple  Chest : chest tubes in place, surgical incision dressing c/d/i  CV- Normal S1, S2 with no murmurs,gallops,rubs  Resp- crackles b/l , Unlabored  Abdomen- NTND, BS heard, soft  Extrem- No cyanosis, clubbing, edema.  Skin- No rashes, lesions, ulcers  Neuro: AO x4, no FND   Access: permcath     Laboratory:  CBC:     Recent Labs  Lab 02/11/17  0414   WBC 10.84   RBC 2.84*   HGB 8.6*  8.6*   HCT 25.4*  25.4*   PLT 96*   MCV 89   MCH 30.3   MCHC 33.9     CMP:   Recent Labs  Lab 02/08/17  1714  02/12/17  1404   *  < > 101   CALCIUM 7.6*  < > 8.3*   ALBUMIN 2.2*  < > 2.5*   PROT 5.1*  --   --      < > 138   K 5.0  < > 4.5   CO2 21*  < > 24   *  < > 105   BUN 40*  < > 10   CREATININE 3.9*  < > 1.5*   ALKPHOS 53*  --   --    ALT 37  --   --    AST 51*  --   --    BILITOT 1.3*  --   --    < > = values in this interval not displayed.  ABGs:     Recent Labs  Lab 02/10/17  1136   PH 7.345*   PCO2 35.1   HCO3 19.1*   POCSATURATED 95   BE -7       Diagnostic Results:  Labs:  Reviewed  X-Ray: Reviewed    ASSESSMENT/PLAN:     47 y.o. AAM with h/o ASD repair at 7 yo, aortic stenosis with CHF wirh rEF, pulmonary HTN, active hep B transferred from [facility] for surgery eval for AVR.     Now s/p redo sternotomy with  AVR and Tricuspid valve repair (2/8/2017)     CKD 5 progressed to ESRD on IHD MWF / TTS    -Pt continues on 8 L HF NC   - Net neg 1.1 L yest   - Lytes and acid base stable   - Pt responded to lasix challenge yest made 1.1 L of urine   - recommend continue lasix 80 mg BID   - continue CRRT at high UF     Aixa Ceja MD   Nephrology fellow   Pager [phone]

## 2017-02-12 NOTE — PROVIDER TRANSFER
Dr. Viramontes aware that pts MAP 85-91 on 15mcg/hr of Cardene. MD aware of all other VS, UOP, and gtts. MD also aware of CVP of 18. Order for hydralazine received and carried out. Will continue to monitor pt closely

## 2017-02-12 NOTE — PROGRESS NOTES
"General Surgery Daily Progress Note    Isidro White  47 y.o.    Hospital Day: 10    Post Op Day: 1 Day Post-Op       Subjective  Continues hypertensive. On cardene. No acute events overnight. Feeling well. Pain controlled.     Objective  Temp:  [98.4 °F (36.9 °C)-99 °F (37.2 °C)]   Pulse:  []   Resp:  [6-22]   BP: (110-142)/(21-80)   SpO2:  [89 %-100 %]   Arterial Line BP: (115-139)/(57-74)     Labs  No results for input(s): WBC, RBC, HGB, HCT, PLT, MCV, MCH, MCHC in the last 24 hours.    Recent Labs  Lab 02/12/17  0407   CALCIUM 8.3*      K 4.4   CO2 23      BUN 22*   CREATININE 2.7*       Recent Labs  Lab 02/12/17 0407 02/12/17  0859   INR 1.4*  --    APTT 58.8* 64.3*       Visit Vitals    /68 (BP Location: Left arm, Patient Position: Lying, BP Method: Automatic)    Pulse 94    Temp 98.4 °F (36.9 °C) (Oral)    Resp 20    Ht 6' 2" (1.88 m)    Wt 98.1 kg (216 lb 4.3 oz)    SpO2 (!) 92%    BMI 27.77 kg/m2       General: Mildly uncomfortable appearing  Head: Normocephalic, without obvious abnormality, atraumatic  Neck: Supple  Lungs: Saturating well on minimal NC  Heart: On significant pressor support, regular rhythm   Abdomen: soft, NT/ND, normal bowel sounds  Extremities: normal, atraumatic, no edema  Neurologic: Alert and oriented    Imaging Results         X-Ray Chest AP Portable (Final result) Result time:  02/11/17 08:49:31    Final result by Jose Feliciano MD (02/11/17 08:49:31)    Impression:        Extubation. Otherwise no significant change in appearance of the chest.      Electronically signed by: JOSE FELICIANO MD  Date:     02/11/17  Time:    08:49     Narrative:    PORTABLE AP CHEST:      Comparison: None.    Findings:     Endotracheal and enteric tubes have been removed. Remaining central lines are unchanged. Appears the chest is not significantly change with retrocardiac opacity. The cardiac silhouette isunchanged in size.  There are no acute bony " abnormalities.            X-Ray Chest 1 View (Final result) Result time:  02/10/17 06:22:14    Final result by Marjan Tai MD (02/10/17 06:22:14)    Impression:      No significant interval detrimental change compared to most recent radiograph 2/9/2017    ______________________________________     Electronically signed by resident: MARJAN TAI MD  Date:     02/10/17  Time:    06:17            As the supervising and teaching physician, I personally reviewed the images and resident's interpretation and I agree with the findings.          Electronically signed by: HARRY YANG  Date:     02/10/17  Time:    06:22     Narrative:    Time of Procedure: 02/10/17 05:41:22  Accession # [id number]    Comparison: Chest radiograph 02/09/2017    Number of views: 1    Findings:  Distal tip of endotracheal tube projects over the mid trachea 2.5 cm above the dillon.  LEFT IJ [place]-Lenny catheter terminates over the expected location of the pulmonary outflow.  RIGHT IJ terminates over the cavoatrial junction.  RIGHT mediastinal drain in stable position.  Surgical clips project over the RIGHT lower mediastinum.  Postsurgical changes compatible with midline sternotomy and aortic valve replacement.  Cardiac silhouette is enlarged similar to prior.  Mediastinal structures are midline.  No large consolidation or pleural effusion.  No pneumothorax.            X-Ray Chest AP Portable (Final result) Result time:  02/09/17 08:13:05    Final result by Sophia Elias MD (02/09/17 08:13:05)    Impression:      Satisfactory postoperative chest radiograph with sternal sutures well aligned.       Electronically signed by: Sophia Elias MD  Date:     02/09/17  Time:    08:13     Narrative:    Time of Procedure: 01/25/12 07:09:00  Accession # [id number]    Comparison: 2/8/2017, 1909 hrs.    Number of views: 2.     Findings:  See below.            X-Ray Chest AP Portable (Final result) Result time:  02/08/17 21:27:31    Final result by  Jose Feliciano MD (02/08/17 21:27:31)    Impression:        Support devices as above and recent TAVR without evidence of complication.      Electronically signed by: JOSE FELICIANO MD  Date:     02/08/17  Time:    21:27     Narrative:    PORTABLE AP CHEST:      Comparison: CT T./7/17    Findings:     Aortic valve replacement. Right IJ central line terminates over the right atrium. Second IJ central line terminates over the SVC [place]-Lenny catheter terminates over the pulmonary outflow. Mediastinal drain in place. Bilateral chest tubes.No focal consolidations. There is no pleural effusion or pneumothorax. The cardiac silhouette isenlarged in size.  There are no acute bony abnormalities.            CT Chest Without Contrast (Final result) Result time:  02/08/17 10:15:53    Final result by Pranav Tai MD (02/08/17 10:15:53)    Impression:       1. Aorta maintains normal caliber, contour and course.  Calcification of the aortic valve is present.  There is also bulky calcification in the cardiac fibrous skeleton at the junction of aortic and mitral bowels.  LEFT ventricle and RIGHT atrium appear enlarged but detail is limited on this study performed without intravenous contrast medium and without gating.    2. Retrosternal structures were reviewed in this patient with planned repeat sternotomy.      - There are seven sternal sutures, the first three at the level of the manubrium.      - There is minimal fat  the posterior table of the manubrium from the anterior margin of the LEFT innominate vein.      - At least 1 cm of fat separates the sternomanubrial junction from the anterior wall of the distal ascending aorta.  The aorta remains well removed from the posterior table of the sternum inferiorly.      - However the anterior wall of the RIGHT ventricle is closely opposed to the posterior table of the sternum between the fourth and fifth sternal sutures extending inferiorly past the seventh  sternal suture.  This finding is especially well seen on sagittal series 602 image 39.    3. Solitary 0.3 cm pulmonary nodule within the posterior basal aspect of the left lower lobe (axial series 2, image 32). Per Fleischner Society guidelines; in a low risk patient, no follow up is needed. In a high risk patient (e.g. history of smoking or malignancy), those guidelines recommend 12 month CT chest follow up to assess for stability 2/2018.    4. Dilated hepatic veins suggest elevated RIGHT heart pressures, tricuspid insufficiency or both.    5. Moderate volume abdominal ascites which is incompletely visualized.  Etiology of the intraperitoneal fluid is unclear aerated may be related to elevated RIGHT heart pressures.  ______________________________________     Electronically signed by resident: MINERVA LLOYD MD  Date:     02/08/17  Time:    09:33            As the supervising and teaching physician, I personally reviewed the images and resident's interpretation and I agree with the findings.          Electronically signed by: Sophia Elias MD  Date:     02/08/17  Time:    10:15     Narrative:    Time of Procedure: 02/07/17 19:35:34  Accession # [id number]    Reason for exam: Preoperative aortic valve replacement, evaluate aorta    Comparison: Chest radiograph 2/3/2017, 0849 hrs.    Technique:   The chest was surveyed from the apices through the costophrenic angles.  Data was reconstructed at 5-mm increments for contiguous 5-mm images in the axial, sagittal and coronal planes and post processed for extraction of 1.25-mm images at 10-mm increments and for 8 mm maximal-intensity (MIP) images at 2 mm increments confined to the axial plane.  No additional radiation was employed.      Findings:   Devices:  1.  Midline sternotomy wires appear intact with sternal fragments well opposed.  These wires are small and slender indicating sternotomy in childhood.  2. RIGHT IJ central venous catheter and RIGHT subclavian trialysis  catheter terminate in the cephalad portion of the RIGHT atrium.      Retrosternal structures were reviewed in this patient with planned repeat sternotomy.      - There are seven sternal sutures, the first three at the level of the manubrium.      - There is minimal fat  the posterior table of the manubrium from the anterior margin of the LEFT innominate vein.      - At least 1 cm of fat separates the sternomanubrial junction from the anterior wall of the distal ascending aorta.  The aorta remains well removed from the posterior table of the sternum inferiorly.      - However the anterior wall of the RIGHT ventricle including RVOT is closely opposed to the posterior table of the sternum.  The cephalad aspect of this close apposition lies between the fourth and fifth sternal sutures at the RVOT.  The RV remains closely applied to the posterior sternal table inferiorly past the seventh  and most distal sternal suture.  This finding is especially well seen on sagittal series 602 image 39.      There is no convincing evidence of abnormality at the base of the neck.      Blood pool is lower attenuation than the myocardium (32 Hounsfield units versus 41 Hounsfield units) consistent with anemia.  Although no intravenous contrast medium or cardiac gating was used we suspect enlargement of the LEFT ventricle and RIGHT atrium.  There is no pleural or pericardial fluid and no pleural or pericardial calcification.    There is left-sided arch with 3 branch vessels.      There is radiopaque material in semicircular distribution at the anterior and lateral wall of the very proximal ascending aorta or aortic valve annulus (axial series 2 images 25 through 27). Calcification is present in the aortic valve.  There is also a focus of bulky calcification in the fibrous skeleton of the heart at the junction of aortic and mitral valves (axial series 2 images 30 and 31).    We detect no calcific atherosclerosis in the aorta,  coronary arteries or other aortic branches.    Measurements of the aorta are limited by the absence of intravenous contrast medium and by transmitted cardiac motion.  Aortic measurements on this noncontrast  study are as follows:    - Mid AAo = 3.8 cm   (axial series 2 image 20); this is less well seen on coronal images because of artifact associated with the catheter in the SVC.    - Distal AAo = 3.7 cm   (sagittal series 602 image 37)    - Aortic arch = 3.7 cm (axial series 2 image 14).    - Isthmus = 3.1 cm   (sagittal series 602 image 44)    - Prox D'g Ao = 3.0 cm   (sagittal series 602 image 41)    - Distal D'g Ao = 3.0 cm   (sagittal series 602 image 39)    Pulmonary arteries have normal caliber, contour and distribution.  There are four pulmonary veins.  Systemic and pulmonary veno atrial connections are concordant.      There is no lymph node enlargement.    Esophagus maintains normal caliber and course.  The hepatic veins are dilated as might occur with tricuspid insufficiency, elevated right heart pressure causing elevated systemic venous pressure, or both. There is significant volume of ascites incompletely visualized.  There is no bowel distension, free air, biliary dilatation or other significant abnormality involving structures in the upper abdomen.      Osseous structures demonstrate age-appropriate degenerative changes with no fracture or lytic or blastic lesion.  As noted above the patient has undergone remote sternotomy, likely in childhood.  Sternal fragments are united.  Seven sternal wires, three at the manubrium, are intact.    Extrathoracic soft tissues are unremarkable in this slender individual.     Tracheobronchial tree reveals no significant abnormality.    The lungs are symmetrically expanded. There is a 0.3 cm solitary pulmonary nodule within the posterior basal segment of the left lower lobe (axial series 2, image 32).  If the patient has a history of smoking or other significant  factor, the Fleischner Society guidelines recommend surveillance with repeat noncontrast chest CT at twelve months (2/2018).  In the absence of such increased risk, those guidelines recommend no further assessment.    Lungs are otherwise clear.            X-Ray Chest 1 View (Final result) Result time:  02/03/17 09:30:20    Final result by Haja Maldonado Jr., MD (02/03/17 09:30:20)    Narrative:    Chest single view compared to July 2016.  Right-sided dialysis catheter overlies the SVC.  There is also a right central venous catheter in place.  Heart is enlarged.  Lungs are fairly well aerated.  No pneumothorax.    Impression see above      Electronically signed by: HAJA MALDONADO MD  Date:     02/03/17  Time:    09:30                 Assessment/Plan  47 y.o.yo male s/p REPLACEMENT-VALVE-AORTIC (N/A), REDO STERNOTOMY WITH AORTIC VALVE REPLACEMENT/redo sternotomy (N/A), REPAIR-VALVE-TRICUSPID on 2/8/17    Neuro: alert and oriented  - continue po pain control    CV: s/p aortic valve replacement and tricuspid valve repair,   - add home BP meds to wean off cardene    Pulm: On minimal vent settings, saturating well, gas normal  No results for input(s): PH, PCO2, PO2, HCO3, POCSATURATED, BE in the last 24 hours.-   pulm    Renal: Minimal uop, pt is HD dependent (ESRD)  Recent Labs      02/12/17   0407   BUN  22*   CREATININE  2.7*   - CRRT performed yesterday, continued today, pt tolerated well  - continues to make urine and is responsive to lasix, will schedule 80 bid  - Nephrology following, appreciate recs    Hem/ID: afebrile, no leukocytosis, h/h stable  Recent Labs      02/11/17   0414   HGB  8.6*  8.6*   HCT  25.4*  25.4*   WBC  10.84   - trend labs    Endocrine: euglycemic off of insulin gtt    FEN/GI: renal diet  - no significant electrolyte abnormalities   - trend labs    Dispo: Will attempt to stepdown today if can get patient off emily Viramontes MD PGY II  [phone]

## 2017-02-12 NOTE — PROGRESS NOTES
Progress Note  Nephrology    Admit Date: 2/2/2017   LOS: 9 days     SUBJECTIVE:     Follow-up For:  ESRD on HD     Interval follow up:   Pt extubated, on 8 LPM NC, BP high and cardene drip.    OBJECTIVE:     Vital Signs (Most Recent)  Temp: 98.8 °F (37.1 °C) (02/11/17 1500)  Pulse: 87 (02/11/17 1800)  Resp: 20 (02/11/17 1800)  BP: 120/72 (02/11/17 1800)  SpO2: 96 % (02/11/17 1800)    Vital Signs Range (Last 24H):  Temp:  [98.1 °F (36.7 °C)-99.6 °F (37.6 °C)]   Pulse:  [82-93]   Resp:  [10-25]   BP: (115-120)/(65-73)   SpO2:  [92 %-100 %]   Arterial Line BP: (116-132)/(57-66)       Intake/Output Summary (Last 24 hours) at 02/11/17 1900  Last data filed at 02/11/17 1800   Gross per 24 hour   Intake           3437.2 ml   Output             4132 ml   Net           -694.8 ml     Physical Exam:  General: Well developed, well nourished  HEENT: Conjunctiva clear;   Neck: No JVD noted, Supple  Chest : chest tubes in place, surgical incision dressing c/d/i  CV- Normal S1, S2 with no murmurs,gallops,rubs  Resp- Lungs CTA Bilaterally, Unlabored  Abdomen- NTND, BS normoactive x4 quads, soft  Extrem- No cyanosis, clubbing, edema.  Skin- No rashes, lesions, ulcers  Neuro: sedated, no FND   Access: permcath     Laboratory:  CBC:     Recent Labs  Lab 02/11/17  0414   WBC 10.84   RBC 2.84*   HGB 8.6*  8.6*   HCT 25.4*  25.4*   PLT 96*   MCV 89   MCH 30.3   MCHC 33.9     CMP:   Recent Labs  Lab 02/08/17  1714  02/11/17  1434   *  < > 120*   CALCIUM 7.6*  < > 8.5*   ALBUMIN 2.2*  < > 2.6*   PROT 5.1*  --   --      < > 137   K 5.0  < > 4.8   CO2 21*  < > 24   *  < > 106   BUN 40*  < > 27*   CREATININE 3.9*  < > 3.4*   ALKPHOS 53*  --   --    ALT 37  --   --    AST 51*  --   --    BILITOT 1.3*  --   --    < > = values in this interval not displayed.  ABGs:     Recent Labs  Lab 02/10/17  1136   PH 7.345*   PCO2 35.1   HCO3 19.1*   POCSATURATED 95   BE -7       Diagnostic Results:  Labs: Reviewed  X-Ray:  Reviewed    ASSESSMENT/PLAN:     47 y.o. AAM with h/o ASD repair at 7 yo, aortic stenosis with CHF wirh rEF, pulmonary HTN, active hep B transferred from [facility] for surgery eval for AVR.     Now s/p redo sternotomy with  AVR and Tricuspid valve repair (2/8/2017)     CKD 5 progressed to ESRD on IHD MWF / TTS    -Pt on 8 L HF NC   - Net neg 2 L yest   - Lytes and acid base stable   - will restart CRRT in evening mainly for volume   - Pt still has a urine  Output, recommend 100 mg lasix now followed by 80 mg BID if pt respnds to lasix     Aixa Ceja MD   Nephrology fellow   Pager [phone]

## 2017-02-13 PROBLEM — N18.6 ESRD (END STAGE RENAL DISEASE): Status: ACTIVE | Noted: 2017-02-13

## 2017-02-13 LAB
ALBUMIN SERPL BCP-MCNC: 2.3 G/DL
ALBUMIN SERPL BCP-MCNC: 2.3 G/DL
ALP SERPL-CCNC: 59 U/L
ALT SERPL W/O P-5'-P-CCNC: 5 U/L
ANION GAP SERPL CALC-SCNC: 8 MMOL/L
ANION GAP SERPL CALC-SCNC: 8 MMOL/L
APTT BLDCRRT: 41.4 SEC
APTT BLDCRRT: 65.6 SEC
APTT BLDCRRT: 65.6 SEC
APTT BLDCRRT: 71.3 SEC
APTT BLDCRRT: 80 SEC
AST SERPL-CCNC: 39 U/L
BASOPHILS # BLD AUTO: 0.02 K/UL
BASOPHILS NFR BLD: 0.2 %
BILIRUB SERPL-MCNC: 1.4 MG/DL
BUN SERPL-MCNC: 6 MG/DL
BUN SERPL-MCNC: 6 MG/DL
CALCIUM SERPL-MCNC: 8.4 MG/DL
CALCIUM SERPL-MCNC: 8.4 MG/DL
CHLORIDE SERPL-SCNC: 106 MMOL/L
CHLORIDE SERPL-SCNC: 106 MMOL/L
CO2 SERPL-SCNC: 24 MMOL/L
CO2 SERPL-SCNC: 25 MMOL/L
CREAT SERPL-MCNC: 1 MG/DL
CREAT SERPL-MCNC: 1 MG/DL
DIFFERENTIAL METHOD: ABNORMAL
EOSINOPHIL # BLD AUTO: 0.1 K/UL
EOSINOPHIL # BLD AUTO: 0.2 K/UL
EOSINOPHIL # BLD AUTO: 0.2 K/UL
EOSINOPHIL NFR BLD: 1.4 %
EOSINOPHIL NFR BLD: 2.3 %
EOSINOPHIL NFR BLD: 2.6 %
ERYTHROCYTE [DISTWIDTH] IN BLOOD BY AUTOMATED COUNT: 15.6 %
ERYTHROCYTE [DISTWIDTH] IN BLOOD BY AUTOMATED COUNT: 15.7 %
ERYTHROCYTE [DISTWIDTH] IN BLOOD BY AUTOMATED COUNT: 15.8 %
EST. GFR  (AFRICAN AMERICAN): >60 ML/MIN/1.73 M^2
EST. GFR  (AFRICAN AMERICAN): >60 ML/MIN/1.73 M^2
EST. GFR  (NON AFRICAN AMERICAN): >60 ML/MIN/1.73 M^2
EST. GFR  (NON AFRICAN AMERICAN): >60 ML/MIN/1.73 M^2
GLUCOSE SERPL-MCNC: 101 MG/DL
GLUCOSE SERPL-MCNC: 99 MG/DL
HCT VFR BLD AUTO: 24.4 %
HCT VFR BLD AUTO: 24.7 %
HCT VFR BLD AUTO: 24.9 %
HGB BLD-MCNC: 8 G/DL
HGB BLD-MCNC: 8.1 G/DL
HGB BLD-MCNC: 8.1 G/DL
INR PPP: 1.9
LYMPHOCYTES # BLD AUTO: 0.7 K/UL
LYMPHOCYTES # BLD AUTO: 1.4 K/UL
LYMPHOCYTES # BLD AUTO: 1.7 K/UL
LYMPHOCYTES NFR BLD: 16.6 %
LYMPHOCYTES NFR BLD: 18.1 %
LYMPHOCYTES NFR BLD: 7.8 %
MAGNESIUM SERPL-MCNC: 1.8 MG/DL
MAGNESIUM SERPL-MCNC: 1.8 MG/DL
MCH RBC QN AUTO: 30.2 PG
MCH RBC QN AUTO: 30.2 PG
MCH RBC QN AUTO: 30.3 PG
MCHC RBC AUTO-ENTMCNC: 32.5 %
MCHC RBC AUTO-ENTMCNC: 32.8 %
MCHC RBC AUTO-ENTMCNC: 32.8 %
MCV RBC AUTO: 92 FL
MCV RBC AUTO: 93 FL
MCV RBC AUTO: 93 FL
MONOCYTES # BLD AUTO: 0.7 K/UL
MONOCYTES # BLD AUTO: 0.7 K/UL
MONOCYTES # BLD AUTO: 1.6 K/UL
MONOCYTES NFR BLD: 17.6 %
MONOCYTES NFR BLD: 7.4 %
MONOCYTES NFR BLD: 8.3 %
NEUTROPHILS # BLD AUTO: 6.2 K/UL
NEUTROPHILS # BLD AUTO: 6.6 K/UL
NEUTROPHILS # BLD AUTO: 6.6 K/UL
NEUTROPHILS NFR BLD: 71.8 %
NEUTROPHILS NFR BLD: 72.2 %
NEUTROPHILS NFR BLD: 72.9 %
PHOSPHATE SERPL-MCNC: 3.1 MG/DL
PLATELET # BLD AUTO: 109 K/UL
PLATELET # BLD AUTO: 117 K/UL
PLATELET # BLD AUTO: 125 K/UL
PMV BLD AUTO: 10.2 FL
PMV BLD AUTO: 10.4 FL
PMV BLD AUTO: 11 FL
POCT GLUCOSE: 108 MG/DL (ref 70–110)
POCT GLUCOSE: 108 MG/DL (ref 70–110)
POCT GLUCOSE: 121 MG/DL (ref 70–110)
POCT GLUCOSE: 126 MG/DL (ref 70–110)
POTASSIUM SERPL-SCNC: 4.5 MMOL/L
POTASSIUM SERPL-SCNC: 4.6 MMOL/L
PROT SERPL-MCNC: 6.7 G/DL
PROTHROMBIN TIME: 18.8 SEC
RBC # BLD AUTO: 2.65 M/UL
RBC # BLD AUTO: 2.67 M/UL
RBC # BLD AUTO: 2.68 M/UL
SODIUM SERPL-SCNC: 138 MMOL/L
SODIUM SERPL-SCNC: 139 MMOL/L
WBC # BLD AUTO: 8.54 K/UL
WBC # BLD AUTO: 9.05 K/UL
WBC # BLD AUTO: 9.24 K/UL

## 2017-02-13 PROCEDURE — 25000003 PHARM REV CODE 250: Performed by: STUDENT IN AN ORGANIZED HEALTH CARE EDUCATION/TRAINING PROGRAM

## 2017-02-13 PROCEDURE — 97803 MED NUTRITION INDIV SUBSEQ: CPT

## 2017-02-13 PROCEDURE — 83735 ASSAY OF MAGNESIUM: CPT | Mod: 91

## 2017-02-13 PROCEDURE — 25000242 PHARM REV CODE 250 ALT 637 W/ HCPCS: Performed by: THORACIC SURGERY (CARDIOTHORACIC VASCULAR SURGERY)

## 2017-02-13 PROCEDURE — 63600175 PHARM REV CODE 636 W HCPCS: Performed by: STUDENT IN AN ORGANIZED HEALTH CARE EDUCATION/TRAINING PROGRAM

## 2017-02-13 PROCEDURE — 80100020 *HC HEMODIALYSIS 1:1 - ARF

## 2017-02-13 PROCEDURE — 80069 RENAL FUNCTION PANEL: CPT

## 2017-02-13 PROCEDURE — 85730 THROMBOPLASTIN TIME PARTIAL: CPT | Mod: 91

## 2017-02-13 PROCEDURE — 85610 PROTHROMBIN TIME: CPT

## 2017-02-13 PROCEDURE — 63600175 PHARM REV CODE 636 W HCPCS: Performed by: INTERNAL MEDICINE

## 2017-02-13 PROCEDURE — 80100014 HC HEMODIALYSIS 1:1

## 2017-02-13 PROCEDURE — 90945 DIALYSIS ONE EVALUATION: CPT | Mod: ,,, | Performed by: INTERNAL MEDICINE

## 2017-02-13 PROCEDURE — 94761 N-INVAS EAR/PLS OXIMETRY MLT: CPT

## 2017-02-13 PROCEDURE — 86709 HEPATITIS A IGM ANTIBODY: CPT

## 2017-02-13 PROCEDURE — 25000003 PHARM REV CODE 250: Performed by: NURSE PRACTITIONER

## 2017-02-13 PROCEDURE — 83735 ASSAY OF MAGNESIUM: CPT

## 2017-02-13 PROCEDURE — 94664 DEMO&/EVAL PT USE INHALER: CPT

## 2017-02-13 PROCEDURE — 80100008 HC CRRT DAILY MAINTENANCE

## 2017-02-13 PROCEDURE — 25000003 PHARM REV CODE 250: Performed by: GENERAL PRACTICE

## 2017-02-13 PROCEDURE — 94799 UNLISTED PULMONARY SVC/PX: CPT

## 2017-02-13 PROCEDURE — 25000003 PHARM REV CODE 250: Performed by: THORACIC SURGERY (CARDIOTHORACIC VASCULAR SURGERY)

## 2017-02-13 PROCEDURE — 25000003 PHARM REV CODE 250: Performed by: HOSPITALIST

## 2017-02-13 PROCEDURE — 86704 HEP B CORE ANTIBODY TOTAL: CPT

## 2017-02-13 PROCEDURE — 63600175 PHARM REV CODE 636 W HCPCS: Performed by: HOSPITALIST

## 2017-02-13 PROCEDURE — 63600175 PHARM REV CODE 636 W HCPCS: Performed by: NURSE PRACTITIONER

## 2017-02-13 PROCEDURE — 86580 TB INTRADERMAL TEST: CPT | Performed by: NURSE PRACTITIONER

## 2017-02-13 PROCEDURE — 27100171 HC OXYGEN HIGH FLOW UP TO 24 HOURS

## 2017-02-13 PROCEDURE — 99233 SBSQ HOSP IP/OBS HIGH 50: CPT | Mod: ,,, | Performed by: SURGERY

## 2017-02-13 PROCEDURE — 86706 HEP B SURFACE ANTIBODY: CPT

## 2017-02-13 PROCEDURE — 27000221 HC OXYGEN, UP TO 24 HOURS

## 2017-02-13 PROCEDURE — 90945 DIALYSIS ONE EVALUATION: CPT

## 2017-02-13 PROCEDURE — 85025 COMPLETE CBC W/AUTO DIFF WBC: CPT | Mod: 91

## 2017-02-13 PROCEDURE — 87340 HEPATITIS B SURFACE AG IA: CPT

## 2017-02-13 PROCEDURE — 94640 AIRWAY INHALATION TREATMENT: CPT

## 2017-02-13 PROCEDURE — 80053 COMPREHEN METABOLIC PANEL: CPT

## 2017-02-13 PROCEDURE — 20000000 HC ICU ROOM

## 2017-02-13 RX ORDER — SODIUM CHLORIDE 9 MG/ML
INJECTION, SOLUTION INTRAVENOUS
Status: DISCONTINUED | OUTPATIENT
Start: 2017-02-13 | End: 2017-02-14

## 2017-02-13 RX ORDER — CLONIDINE HYDROCHLORIDE 0.1 MG/1
0.1 TABLET ORAL ONCE
Status: COMPLETED | OUTPATIENT
Start: 2017-02-13 | End: 2017-02-13

## 2017-02-13 RX ORDER — WARFARIN 2 MG/1
2 TABLET ORAL DAILY
Status: DISCONTINUED | OUTPATIENT
Start: 2017-02-13 | End: 2017-02-15

## 2017-02-13 RX ORDER — OXYMETAZOLINE HCL 0.05 %
2 SPRAY, NON-AEROSOL (ML) NASAL 2 TIMES DAILY
Status: DISPENSED | OUTPATIENT
Start: 2017-02-13 | End: 2017-02-16

## 2017-02-13 RX ORDER — CLONIDINE HYDROCHLORIDE 0.1 MG/1
0.1 TABLET ORAL 2 TIMES DAILY
Status: DISCONTINUED | OUTPATIENT
Start: 2017-02-13 | End: 2017-02-13

## 2017-02-13 RX ORDER — SODIUM CHLORIDE 9 MG/ML
INJECTION, SOLUTION INTRAVENOUS ONCE
Status: DISCONTINUED | OUTPATIENT
Start: 2017-02-13 | End: 2017-02-13

## 2017-02-13 RX ORDER — CLONIDINE HYDROCHLORIDE 0.1 MG/1
0.1 TABLET ORAL 3 TIMES DAILY
Status: DISCONTINUED | OUTPATIENT
Start: 2017-02-13 | End: 2017-02-20

## 2017-02-13 RX ADMIN — LABETALOL HCL 300 MG: 300 TABLET, FILM COATED ORAL at 09:02

## 2017-02-13 RX ADMIN — HEPARIN SODIUM AND DEXTROSE 1800 UNITS/HR: 10000; 5 INJECTION INTRAVENOUS at 03:02

## 2017-02-13 RX ADMIN — POLYETHYLENE GLYCOL 3350 17 G: 17 POWDER, FOR SOLUTION ORAL at 09:02

## 2017-02-13 RX ADMIN — MAGNESIUM SULFATE IN WATER 2 G: 40 INJECTION, SOLUTION INTRAVENOUS at 10:02

## 2017-02-13 RX ADMIN — FUROSEMIDE 80 MG: 10 INJECTION, SOLUTION INTRAMUSCULAR; INTRAVENOUS at 09:02

## 2017-02-13 RX ADMIN — HYDRALAZINE HYDROCHLORIDE 100 MG: 50 TABLET ORAL at 01:02

## 2017-02-13 RX ADMIN — OXYCODONE HYDROCHLORIDE 10 MG: 5 TABLET ORAL at 12:02

## 2017-02-13 RX ADMIN — IPRATROPIUM BROMIDE AND ALBUTEROL SULFATE 3 ML: .5; 3 SOLUTION RESPIRATORY (INHALATION) at 04:02

## 2017-02-13 RX ADMIN — LABETALOL HYDROCHLORIDE 10 MG: 5 INJECTION, SOLUTION INTRAVENOUS at 03:02

## 2017-02-13 RX ADMIN — IPRATROPIUM BROMIDE AND ALBUTEROL SULFATE 3 ML: .5; 3 SOLUTION RESPIRATORY (INHALATION) at 11:02

## 2017-02-13 RX ADMIN — HYDRALAZINE HYDROCHLORIDE 100 MG: 50 TABLET ORAL at 05:02

## 2017-02-13 RX ADMIN — SILDENAFIL 20 MG: 20 TABLET ORAL at 01:02

## 2017-02-13 RX ADMIN — AMLODIPINE BESYLATE 10 MG: 10 TABLET ORAL at 08:02

## 2017-02-13 RX ADMIN — WARFARIN SODIUM 2 MG: 2 TABLET ORAL at 05:02

## 2017-02-13 RX ADMIN — IPRATROPIUM BROMIDE AND ALBUTEROL SULFATE 3 ML: .5; 3 SOLUTION RESPIRATORY (INHALATION) at 03:02

## 2017-02-13 RX ADMIN — MORPHINE SULFATE 3 MG: 2 INJECTION, SOLUTION INTRAMUSCULAR; INTRAVENOUS at 03:02

## 2017-02-13 RX ADMIN — Medication 5 UNITS: at 12:02

## 2017-02-13 RX ADMIN — CLONIDINE HYDROCHLORIDE 0.1 MG: 0.1 TABLET ORAL at 09:02

## 2017-02-13 RX ADMIN — SILDENAFIL 20 MG: 20 TABLET ORAL at 05:02

## 2017-02-13 RX ADMIN — IPRATROPIUM BROMIDE AND ALBUTEROL SULFATE 3 ML: .5; 3 SOLUTION RESPIRATORY (INHALATION) at 07:02

## 2017-02-13 RX ADMIN — OXYCODONE HYDROCHLORIDE 5 MG: 5 TABLET ORAL at 02:02

## 2017-02-13 RX ADMIN — OXYCODONE HYDROCHLORIDE AND ACETAMINOPHEN 500 MG: 500 TABLET ORAL at 08:02

## 2017-02-13 RX ADMIN — HEPARIN SODIUM AND DEXTROSE 1800 UNITS/HR: 10000; 5 INJECTION INTRAVENOUS at 12:02

## 2017-02-13 RX ADMIN — LABETALOL HCL 300 MG: 300 TABLET, FILM COATED ORAL at 05:02

## 2017-02-13 RX ADMIN — FUROSEMIDE 80 MG: 10 INJECTION, SOLUTION INTRAMUSCULAR; INTRAVENOUS at 06:02

## 2017-02-13 RX ADMIN — ASPIRIN 81 MG: 81 TABLET, COATED ORAL at 09:02

## 2017-02-13 RX ADMIN — OXYMETAZOLINE HYDROCHLORIDE 2 SPRAY: 5 SPRAY NASAL at 07:02

## 2017-02-13 RX ADMIN — LABETALOL HCL 300 MG: 300 TABLET, FILM COATED ORAL at 01:02

## 2017-02-13 RX ADMIN — HYDRALAZINE HYDROCHLORIDE 100 MG: 50 TABLET ORAL at 09:02

## 2017-02-13 RX ADMIN — SODIUM FERRIC GLUCONATE COMPLEX 125 MG: 12.5 INJECTION INTRAVENOUS at 09:02

## 2017-02-13 RX ADMIN — IPRATROPIUM BROMIDE AND ALBUTEROL SULFATE 3 ML: .5; 3 SOLUTION RESPIRATORY (INHALATION) at 08:02

## 2017-02-13 RX ADMIN — OXYCODONE HYDROCHLORIDE AND ACETAMINOPHEN 500 MG: 500 TABLET ORAL at 09:02

## 2017-02-13 RX ADMIN — OXYCODONE HYDROCHLORIDE 5 MG: 5 TABLET ORAL at 06:02

## 2017-02-13 RX ADMIN — IPRATROPIUM BROMIDE AND ALBUTEROL SULFATE 3 ML: .5; 3 SOLUTION RESPIRATORY (INHALATION) at 12:02

## 2017-02-13 RX ADMIN — CLONIDINE HYDROCHLORIDE 0.1 MG: 0.1 TABLET ORAL at 06:02

## 2017-02-13 RX ADMIN — HYDRALAZINE HYDROCHLORIDE 10 MG: 20 INJECTION INTRAMUSCULAR; INTRAVENOUS at 05:02

## 2017-02-13 RX ADMIN — ERYTHROPOIETIN 10000 UNITS: 10000 INJECTION, SOLUTION INTRAVENOUS; SUBCUTANEOUS at 10:02

## 2017-02-13 RX ADMIN — OXYCODONE HYDROCHLORIDE 10 MG: 5 TABLET ORAL at 07:02

## 2017-02-13 RX ADMIN — OXYMETAZOLINE HYDROCHLORIDE 2 SPRAY: 5 SPRAY NASAL at 09:02

## 2017-02-13 RX ADMIN — SILDENAFIL 20 MG: 20 TABLET ORAL at 09:02

## 2017-02-13 NOTE — PROGRESS NOTES
Progress Note  Surgical Intensive Care    Admit Date: 2/2/2017  Post-operative Day: 5 Days Post-Op  Hospital Day: 12    SUBJECTIVE:     Follow-up For:  Procedure(s) (LRB):  REPLACEMENT-VALVE-AORTIC (N/A)  REDO STERNOTOMY WITH AORTIC VALVE REPLACEMENT/redo sternotomy (N/A)  REPAIR-VALVE-TRICUSPID    HPI: Patient is a 47 y.o. male with h/o ASD repair at 9 yo, AS with CHF wirh rEF, pulmonary HTN, CKDIII, active hep B transferred from [facility] for surgery eval. Recently admitted to OSH for SVT, HTN, CHF exacerbation 1/23 and discharged on coreg, clonidine, verapamil, readmitted to OSH 1/25 with chest pain and found to be in heart block which was thought to be medication induced. Pt came out of SVT, out of ICU. He had worsening of his CKD and had a tunneled RIJ line placed for HD. Pt reports still making urine. Patient underwent aortic valve replacement that was remarkable for mild difficulty going off pump due to systolic dysfunction. Patient arrived to SICU intubated on epinephrine, milrinone, norepinephrine and vasopressin and propofol and fentanyl.     Interval history: Desaturation event overnight x 1. CXR with interval worsening.  on 55% FiO2. Off cardene gtt this AM.     Continuous Infusions:   sodium chloride 0.45% 10 mL/hr (02/09/17 0800)    heparin (porcine) in 5 % dex 1,800 Units/hr (02/13/17 0700)    nicardipine Stopped (02/13/17 0100)     Scheduled Meds:   albuterol-ipratropium 2.5mg-0.5mg/3mL  3 mL Nebulization Q4H    amlodipine  10 mg Oral Daily    ascorbic acid (vitamin C)  500 mg Oral BID    aspirin  81 mg Oral Daily    chlorhexidine  15 mL Mouth/Throat BID    cloNIDine  0.1 mg Oral QHS    epoetin vikki (PROCRIT) injection  10,000 Units Intravenous Every Mon, Wed, Fri    ferric gluconate (FERRLECIT) IVPB  125 mg Intravenous Every Mon, Wed, Fri    furosemide  80 mg Intravenous BID    hydrALAZINE  100 mg Oral Q8H    labetalol  300 mg Oral Q8H    pantoprazole  40 mg Intravenous  Daily    polyethylene glycol  17 g Per G Tube Daily    sildenafil  20 mg Oral TID    warfarin  2 mg Oral Daily     PRN Meds:sodium chloride 0.9%, sodium chloride, sodium chloride, acetaminophen, dextrose 50%, glucagon (human recombinant), heparin (porcine), hydrALAZINE, insulin aspart, labetalol, lactulose, magnesium sulfate IVPB, metoclopramide HCl, morphine, ondansetron, oxycodone, oxycodone, sodium phosphate IVPB, sodium phosphate IVPB, sodium phosphate IVPB    Review of patient's allergies indicates:  No Known Allergies    OBJECTIVE:     Vital Signs (Most Recent)  Temp: 98.4 °F (36.9 °C) (02/13/17 0700)  Pulse: 105 (02/13/17 0715)  Resp: (!) 23 (02/13/17 0715)  BP: 107/62 (02/13/17 0200)  SpO2: (!) 93 % (02/13/17 0715)    Vital Signs Range (Last 24H):  Temp:  [98.4 °F (36.9 °C)-99 °F (37.2 °C)]   Pulse:  []   Resp:  [0-29]   BP: (100-135)/(21-75)   SpO2:  [86 %-100 %]   Arterial Line BP: (106-139)/(54-74)     I & O (Last 24H):    Intake/Output Summary (Last 24 hours) at 02/13/17 0734  Last data filed at 02/13/17 0700   Gross per 24 hour   Intake          6621.68 ml   Output            61455 ml   Net         -6371.32 ml     Ventilator Data (Last 24H):          Hemodynamic Parameters (Last 24H):       Physical Exam:  General: well developed, well nourished, awake, alert, no distress   HENT: Head:normocephalic, atraumatic.  Eyes: conjunctivae/corneas clear. PERRL.   Neck: supple, symmetrical, trachea midline, no JVD and thyroid not enlarged, symmetric, no tenderness/mass/nodules  Lungs: clear to auscultation bilaterally and normal respiratory effort  Cardiovascular: Heart: tachycardic with regular rhythm, S1, S2 normal, no murmur, click, rub or gallop. Chest Wall: sternotomy dressing C/D/I. Extremities: no cyanosis or edema, or clubbing. Pulses: 2+ and symmetric.  Abdomen/Rectal: Abdomen: soft, non-tender non-distented; bowel sounds normal; no masses, no organomegaly.   Skin: Skin color, texture, turgor  normal. No rashes or lesions    Wound/Incision:   clean, dry, intact    Lines/Drains:       Hemodialysis Catheter right subclavian (Active)   Site Assessment Clean;Dry;Intact;No redness;No swelling;No drainage;No warmth 2/9/2017  3:00 AM   Status Deaccessed 2/9/2017  3:00 AM   Flows Good 2/7/2017  7:47 AM   Dressing Intervention Dressing reinforced 2/6/2017  8:00 AM   Dressing Status Clean;Dry;Intact 2/9/2017  3:00 AM   Dressing Change Due 02/08/17 2/7/2017  8:00 PM   Site Condition No complications 2/9/2017  3:00 AM   Dressing Occlusive 2/9/2017  3:00 AM   Daily Line Review Performed 2/9/2017  3:00 AM   Number of days:            Introducer 02/08/17 left internal jugular (Active)   Specific Qualities Freeman in place 2/9/2017  3:00 AM   Dressing Status Clean;Dry;Intact 2/9/2017  3:00 AM   Dressing Change Due 02/15/17 2/9/2017  3:00 AM   Daily Line Review Performed 2/9/2017  3:00 AM   Number of days:1            Pulmonary Artery Catheter Assessment  02/08/17 0820 (Active)   Dressing biopatch in place;dressing dry and intact 2/9/2017  3:00 AM   Securement secured w/ sutures 2/9/2017  3:00 AM   Current Insertion Depth (cm) 49 cm 2/9/2017  3:00 AM   Phlebitis 0-->no symptoms 2/9/2017  3:00 AM   Infiltration 0-->no symptoms 2/9/2017  3:00 AM   Waveform normal 2/9/2017  3:00 AM   Prox Injectate Status Infusing 2/9/2017  3:00 AM   Prox Infusate Status Infusing 2/9/2017  3:00 AM   Distal Status Infusing 2/9/2017  3:00 AM   Daily Line Review Performed 2/9/2017  3:00 AM   Number of days:0            CVC Triple Lumen - PreSep Cath 01/24/17 right internal jugular (Active)   Site Assessment Clean;Dry;Intact;No redness;No swelling;No drainage;No warmth 2/9/2017  3:00 AM   Proximal Lumen Status Infusing 2/9/2017  3:00 AM   Medial Lumen Status Infusing 2/9/2017  3:00 AM   Distal Lumen Status Infusing 2/9/2017  3:00 AM   Dressing Type Transparent 2/9/2017  3:00 AM   Dressing Status Clean;Dry;Intact;Biopatch in place 2/9/2017  3:00 AM    Dressing Intervention New dressing 2/7/2017  7:47 AM   Dressing Change Due 02/13/17 2/9/2017  3:00 AM   Daily Line Review Performed 2/9/2017  3:00 AM   Number of days:16            Peripheral IV - Single Lumen 07/12/16 1535 Right Antecubital (Active)   Number of days:211            Peripheral IV - Single Lumen 02/07/17 1809 Left Forearm (Active)   Site Assessment Clean;Dry;Intact;No redness;No swelling;No warmth;No drainage 2/9/2017  3:00 AM   Line Status Flushed;Infusing 2/9/2017  3:00 AM   Dressing Status Clean;Dry;Intact 2/9/2017  3:00 AM   Dressing Intervention New dressing 2/7/2017  6:10 PM   Dressing Change Due 02/11/17 2/9/2017  3:00 AM   Site Change Due 02/11/17 2/8/2017  7:00 PM   Reason Not Rotated Not due 2/9/2017  3:00 AM   Number of days:1            Peripheral IV - Single Lumen 02/08/17 2243 Right Antecubital (Active)   Site Assessment Clean;Dry;Intact;No redness;No swelling;No drainage;No warmth 2/9/2017  3:00 AM   Line Status Blood return noted;Flushed;Infusing 2/9/2017  3:00 AM   Dressing Status Clean;Dry;Intact 2/9/2017  3:00 AM   Dressing Intervention New dressing 2/9/2017  3:00 AM   Dressing Change Due 02/12/17 2/9/2017  3:00 AM   Site Change Due 02/12/17 2/9/2017  3:00 AM   Reason Not Rotated Not due 2/9/2017  3:00 AM   Number of days:0            Arterial Line 02/08/17 0807 Left Radial (Active)   Site Assessment Clean;Dry;Intact;No swelling;No redness;No warmth;No drainage 2/9/2017  3:00 AM   Line Status Pulsatile blood flow 2/9/2017  3:00 AM   Art Line Waveform Appropriate;Square wave test performed 2/9/2017  3:00 AM   Arterial Line Interventions Leveled;Flushed per protocol 2/9/2017  3:00 AM   Color/Movement/Sensation Capillary refill less than 3 sec 2/9/2017  3:00 AM   Dressing Type Transparent 2/9/2017  3:00 AM   Dressing Status Clean;Dry;Intact 2/9/2017  3:00 AM   Dressing Change Due 02/12/17 2/9/2017  3:00 AM   Number of days:0            Pacer Wires 02/08/17 1542 (Active)   Pacer Wire  Status Ventricular wires disconnected from pacer 2/9/2017  3:00 AM   Site Assessment Clean;Dry;Intact 2/9/2017  3:00 AM   How Pacer Wires are Secured Ventricular wires secured to dressing 2/9/2017  3:00 AM   Dressing Status Clean;Dry;Intact 2/9/2017  3:00 AM   Number of days:0            NG/OG Tube 02/08/17 1100 (Active)   Placement Check placement verified by x-ray;placement verified by distal tube length measurement 2/9/2017  3:00 AM   pH Aspirate Result 5 2/8/2017  5:30 PM   Tolerance no signs/symptoms of discomfort 2/9/2017  3:00 AM   Securement taped to commercial device 2/9/2017  3:00 AM   Clamp Status/Tolerance unclamped 2/9/2017  3:00 AM   Suction Setting/Drainage Method suction initiated at;low;intermittent setting 2/9/2017  3:00 AM   Insertion Site Appearance no redness, warmth, tenderness, skin breakdown, drainage 2/9/2017  3:00 AM   Drainage Green 2/9/2017  3:00 AM   Flush/Irrigation flushed w/;water;no resistance met 2/9/2017  3:00 AM   Number of days:0            Urethral Catheter 02/08/17 0850 Non-latex;Straight-tip;Temperature probe 16 Fr. (Active)   Site Assessment Clean;Intact 2/9/2017  3:00 AM   Collection Container Urimeter 2/9/2017  3:00 AM   Securement Method secured to top of thigh w/ adhesive device 2/9/2017  3:00 AM   Catheter Care Performed yes 2/9/2017  3:00 AM   Reason for Continuing Urinary Catheterization Post operative 2/9/2017  3:00 AM   CAUTI Prevention Bundle Drainage bag off the floor;No dependent loops or kinks 2/8/2017  5:30 PM   Output (mL) 3 mL 2/9/2017  5:45 AM   Number of days:0            Y Chest Tube 1 and 2 02/08/17 1552 Right Mediastinal 19 Fr. 2 Left Mediastinal 19 Fr. (Active)   Function -20 cm H2O 2/9/2017  3:00 AM   Air Leak/Fluctuation air leak present 2/9/2017  3:00 AM   Safety all connections secured 2/9/2017  3:00 AM   Securement tubing anchored to body distal to insertion site w/ tape 2/9/2017  3:00 AM   Left Subcutaneous Emphysema none present 2/9/2017  3:00 AM    Right Subcutaneous Emphysema none present 2/9/2017  3:00 AM   Patency Intervention Stripped;Tip/tilt 2/9/2017  3:00 AM   Drainage Description 1 Serosanguineous 2/9/2017  3:00 AM   Tube 1 Dressing Appearance occlusive gauze dressing intact;clean and dry 2/9/2017  3:00 AM   Site Assessment 1 Not assessed 2/9/2017  3:00 AM   Surrounding Skin 1 Unable to view 2/9/2017  3:00 AM   Drainage Description 2 Sanguineous 2/9/2017  3:00 AM   Tube 2 Dressing Appearance occlusive gauze dressing intact;clean and dry 2/9/2017  3:00 AM   Site Assessment 2 Not assessed 2/9/2017  3:00 AM   Surrounding Skin Unable to view 2/9/2017  3:00 AM   Output (mL) 6 mL 2/9/2017  5:00 AM   Number of days:0            Y Chest Tube 3 and 4 02/08/17 1553 3 Right Pleural 19 Fr. 4 Left Pleural 19 Fr. (Active)   Function -20 cm H2O 2/9/2017  3:00 AM   Air Leak/Fluctuation air leak not present 2/9/2017  3:00 AM   Safety all tubing connections taped 2/9/2017  3:00 AM   Securement tubing anchored to body distal to insertion site w/ tape 2/9/2017  3:00 AM   Left Subcutaneous Emphysema none present 2/9/2017  3:00 AM   Right Subcutaneous Emphysema none present 2/9/2017  3:00 AM   Patency Intervention Stripped;Tip/tilt 2/9/2017  3:00 AM   Drainage Description 3 Serosanguineous 2/9/2017  3:00 AM   Tube 3 Dressing Appearance occlusive gauze dressing intact;clean and dry 2/9/2017  3:00 AM   Site Assessment 3 Not assessed 2/9/2017  3:00 AM   Surrounding Skin 3 Unable to view 2/9/2017  3:00 AM   Drainage Description 4 Sanguineous 2/9/2017  3:00 AM   Tube 4 Dressing Appearance occlusive gauze dressing intact;clean and dry 2/9/2017  3:00 AM   Site Assessment 4 Not assessed 2/9/2017  3:00 AM   Surrounding Skin 4 Unable to view 2/9/2017  3:00 AM   Output (mL) 10 mL 2/9/2017  5:00 AM   Number of days:0       Laboratory (Last 24H):  CBC:    Recent Labs  Lab 02/13/17  0615   WBC 9.05   HGB 8.0*   HCT 24.4*   *     CMP:    Recent Labs  Lab 02/13/17  0615   CALCIUM  8.4*   ALBUMIN 2.3*   PROT 6.7      K 4.5   CO2 25      BUN 6   CREATININE 1.0   ALKPHOS 59   ALT 5*   AST 39   BILITOT 1.4*       Chest X-Ray: Aortic valve replacement. Right IJ central line terminates over the right atrium. Second IJ central line terminates over the SVC [place] catheter terminates over the pulmonary outflow. Mediastinal drain in place. Bilateral chest tubes.No focal consolidations. There is no pleural effusion or pneumothorax. The cardiac silhouette isenlarged in size.  There are no acute bony abnormalities.    ASSESSMENT/PLAN:         Neuro:   Awake, alert, oriented   No sedation      Pulmonary:   - adequate sats on 40% FiO2   - HFNC        Cardiac:  S/p AVR   CHF   - amlodipine, labetalol, clonidine, hydralazine   - off cardene this AM      Renal:   Requiring RRT,   - s/p CRRT 12h overnight  - actually having UOP with lasix bid  - 0.4cc/kg/h        Infectious Disease:   Active Hep B   Ancef per primary for post Op prophylaxis      Hematology/Oncology:  WBC WNL  H/H  stable 8.0/23.7 --> 8.6/25.4-->8.0/24.4         Endocrine:  No issues currently, not on insulin gtt   Glu WnL     Fluids/Electrolytes/Nutrition/GI:   NPO currently   Replete electrolytes prn being mindful of renal function         Pain Management:   Dilaudid 1mg q4h prn     Dispo: step down once cardene weaned, possibly today?        Aishwarya Elam MD  [id number]   Anesthesiology

## 2017-02-13 NOTE — PLAN OF CARE
Pt will need outpt HD and wants [facility] in [place].  SW faxed the request to [facility] ([phone], f/[phone]).  SW will f/u as needed.    Yue Garibay, [facility] x [phone]

## 2017-02-13 NOTE — PLAN OF CARE
Problem: Patient Care Overview  Goal: Plan of Care Review  PHM: CHF, pulmonary HTN, CKD3, Hep B    PSH: ASD repair 9 y/o    Hospital Course:    2/8: Admit SICU Aortic valve replacement, Tricuspid valve repair, Redo sternotomy; 1L albumin, 2 amps bicarb, 2 unit PRBC  2/9: 2 PRBC, 1 amp bicarb, 1 500 5%Albumin, 1-100mL 25 %Albumin Levo/Milrinone off, wean Epi/Vaso, Heparin gtt started    Nursing:  * Accucheck Q6h  *Renal Function Panel q8h  *PTT q6h goal 60-80   Outcome: Ongoing (interventions implemented as appropriate)  CRRT in progress, No issues. UF at goall of 500  Cardene gtt infusing. To keep MAP <80. Unable to wean. PRN and schedulded meds ordered and given to help wean cardene  No complaints of pain. Heparin gtt. PTT theraputice between 60 and 80  See flowsheets and notes

## 2017-02-13 NOTE — PROGRESS NOTES
"General Surgery Daily Progress Note    Isidro Lozano Jr White  47 y.o.    Hospital Day: 11    Post Op Day: 1 Day Post-Op       Subjective  Continues hypertensive. On cardene. No acute events overnight. Pain controlled. Off cardene temporarily overnight. Off again this morning. Will stepdown if tolerates HD trial.     Objective  Temp:  [97.8 °F (36.6 °C)-99 °F (37.2 °C)]   Pulse:  []   Resp:  [0-29]   BP: ()/(55-75)   SpO2:  [86 %-98 %]   Arterial Line BP: ()/(52-68)     Labs    Recent Labs  Lab 02/13/17  0615   WBC 9.05   RBC 2.65*   HGB 8.0*   HCT 24.4*   *   MCV 92   MCH 30.2   MCHC 32.8       Recent Labs  Lab 02/13/17  0615   CALCIUM 8.4*   PROT 6.7      K 4.5   CO2 25      BUN 6   CREATININE 1.0   ALKPHOS 59   ALT 5*   AST 39   BILITOT 1.4*       Recent Labs  Lab 02/13/17  0354 02/13/17  0908   INR 1.9*  --    APTT 65.6*  65.6* 71.3*       Visit Vitals    /66    Pulse 98    Temp 97.8 °F (36.6 °C) (Oral)    Resp 16    Ht 6' 2" (1.88 m)    Wt 98.1 kg (216 lb 4.3 oz)    SpO2 96%    BMI 27.77 kg/m2       General: Mildly uncomfortable appearing  Head: Normocephalic, without obvious abnormality, atraumatic  Neck: Supple  Lungs: Saturating well on minimal NC  Heart: On significant pressor support, regular rhythm   Abdomen: soft, NT/ND, normal bowel sounds  Extremities: normal, atraumatic, no edema  Neurologic: Alert and oriented    Imaging Results         X-Ray Chest 1 View (Final result) Result time:  02/13/17 10:38:56    Final result by Haja Maldonado Jr., MD (02/13/17 10:38:56)    Narrative:    Chest single view compared to February 12.  Tubes and lines remain in similar position.  Continued cardiomegaly and increased opacification at the bases left greater than right.  No pneumothorax.    Impression no significant change.      Electronically signed by: HAJA MALDONADO MD  Date:     02/13/17  Time:    10:38             X-Ray Chest 1 View (Final result) Result time:  " 02/12/17 18:47:30    Final result by Maykel Raphael MD (02/12/17 18:47:30)    Impression:      As above        Electronically signed by: MAYKEL RAPHAEL MD  Date:     02/12/17  Time:    18:47     Narrative:    Chest AP portable    Indication:Desaturation    Comparison:2/11/2017    Findings: Since previous exam, left pulmonary arterial catheter has been discontinued, remaining support devices are stable. The cardiomediastinal silhouette is enlarged, similar to the previous exam noting postsurgical changes.  There is a left pleural effusion, similar.  The trachea is midline.  The lungs are symmetrically expanded bilaterally with patchy increased interstitial and parenchymal attenuation bilaterally, noting possible developing consolidation in a leftward cardiac fashion.   There is no pneumothorax.  The osseous structures are unchanged.            X-Ray Chest AP Portable (Final result) Result time:  02/11/17 08:49:31    Final result by Jose Suarez MD (02/11/17 08:49:31)    Impression:        Extubation. Otherwise no significant change in appearance of the chest.      Electronically signed by: JOSE SUAREZ MD  Date:     02/11/17  Time:    08:49     Narrative:    PORTABLE AP CHEST:      Comparison: None.    Findings:     Endotracheal and enteric tubes have been removed. Remaining central lines are unchanged. Appears the chest is not significantly change with retrocardiac opacity. The cardiac silhouette isunchanged in size.  There are no acute bony abnormalities.            X-Ray Chest 1 View (Final result) Result time:  02/10/17 06:22:14    Final result by Marjan Tai MD (02/10/17 06:22:14)    Impression:      No significant interval detrimental change compared to most recent radiograph 2/9/2017    ______________________________________     Electronically signed by resident: MARJAN TAI MD  Date:     02/10/17  Time:    06:17            As the supervising and teaching physician, I personally  reviewed the images and resident's interpretation and I agree with the findings.          Electronically signed by: HARRY YANG  Date:     02/10/17  Time:    06:22     Narrative:    Time of Procedure: 02/10/17 05:41:22  Accession # [id number]    Comparison: Chest radiograph 02/09/2017    Number of views: 1    Findings:  Distal tip of endotracheal tube projects over the mid trachea 2.5 cm above the dillon.  LEFT IJ Zimmerman-Lenny catheter terminates over the expected location of the pulmonary outflow.  RIGHT IJ terminates over the cavoatrial junction.  RIGHT mediastinal drain in stable position.  Surgical clips project over the RIGHT lower mediastinum.  Postsurgical changes compatible with midline sternotomy and aortic valve replacement.  Cardiac silhouette is enlarged similar to prior.  Mediastinal structures are midline.  No large consolidation or pleural effusion.  No pneumothorax.            X-Ray Chest AP Portable (Final result) Result time:  02/09/17 08:13:05    Final result by Sophia Elias MD (02/09/17 08:13:05)    Impression:      Satisfactory postoperative chest radiograph with sternal sutures well aligned.       Electronically signed by: Sophia Elias MD  Date:     02/09/17  Time:    08:13     Narrative:    Time of Procedure: 01/25/12 07:09:00  Accession # [id number]    Comparison: 2/8/2017, 1909 hrs.    Number of views: 2.     Findings:  See below.            X-Ray Chest AP Portable (Final result) Result time:  02/08/17 21:27:31    Final result by Mercedes Feliciano MD (02/08/17 21:27:31)    Impression:        Support devices as above and recent TAVR without evidence of complication.      Electronically signed by: MERCEDES FELICIANO MD  Date:     02/08/17  Time:    21:27     Narrative:    PORTABLE AP CHEST:      Comparison: CT T./7/17    Findings:     Aortic valve replacement. Right IJ central line terminates over the right atrium. Second IJ central line terminates over the SVC Zimmerman-Lenny catheter terminates  over the pulmonary outflow. Mediastinal drain in place. Bilateral chest tubes.No focal consolidations. There is no pleural effusion or pneumothorax. The cardiac silhouette isenlarged in size.  There are no acute bony abnormalities.            CT Chest Without Contrast (Final result) Result time:  02/08/17 10:15:53    Final result by Pranav Tai MD (02/08/17 10:15:53)    Impression:       1. Aorta maintains normal caliber, contour and course.  Calcification of the aortic valve is present.  There is also bulky calcification in the cardiac fibrous skeleton at the junction of aortic and mitral bowels.  LEFT ventricle and RIGHT atrium appear enlarged but detail is limited on this study performed without intravenous contrast medium and without gating.    2. Retrosternal structures were reviewed in this patient with planned repeat sternotomy.      - There are seven sternal sutures, the first three at the level of the manubrium.      - There is minimal fat  the posterior table of the manubrium from the anterior margin of the LEFT innominate vein.      - At least 1 cm of fat separates the sternomanubrial junction from the anterior wall of the distal ascending aorta.  The aorta remains well removed from the posterior table of the sternum inferiorly.      - However the anterior wall of the RIGHT ventricle is closely opposed to the posterior table of the sternum between the fourth and fifth sternal sutures extending inferiorly past the seventh sternal suture.  This finding is especially well seen on sagittal series 602 image 39.    3. Solitary 0.3 cm pulmonary nodule within the posterior basal aspect of the left lower lobe (axial series 2, image 32). Per Fleischner Society guidelines; in a low risk patient, no follow up is needed. In a high risk patient (e.g. history of smoking or malignancy), those guidelines recommend 12 month CT chest follow up to assess for stability 2/2018.    4. Dilated hepatic veins  suggest elevated RIGHT heart pressures, tricuspid insufficiency or both.    5. Moderate volume abdominal ascites which is incompletely visualized.  Etiology of the intraperitoneal fluid is unclear aerated may be related to elevated RIGHT heart pressures.  ______________________________________     Electronically signed by resident: MINERVA LLOYD MD  Date:     02/08/17  Time:    09:33            As the supervising and teaching physician, I personally reviewed the images and resident's interpretation and I agree with the findings.          Electronically signed by: Sophia Elias MD  Date:     02/08/17  Time:    10:15     Narrative:    Time of Procedure: 02/07/17 19:35:34  Accession # [id number]    Reason for exam: Preoperative aortic valve replacement, evaluate aorta    Comparison: Chest radiograph 2/3/2017, 0849 hrs.    Technique:   The chest was surveyed from the apices through the costophrenic angles.  Data was reconstructed at 5-mm increments for contiguous 5-mm images in the axial, sagittal and coronal planes and post processed for extraction of 1.25-mm images at 10-mm increments and for 8 mm maximal-intensity (MIP) images at 2 mm increments confined to the axial plane.  No additional radiation was employed.      Findings:   Devices:  1.  Midline sternotomy wires appear intact with sternal fragments well opposed.  These wires are small and slender indicating sternotomy in childhood.  2. RIGHT IJ central venous catheter and RIGHT subclavian trialysis catheter terminate in the cephalad portion of the RIGHT atrium.      Retrosternal structures were reviewed in this patient with planned repeat sternotomy.      - There are seven sternal sutures, the first three at the level of the manubrium.      - There is minimal fat  the posterior table of the manubrium from the anterior margin of the LEFT innominate vein.      - At least 1 cm of fat separates the sternomanubrial junction from the anterior wall of the  distal ascending aorta.  The aorta remains well removed from the posterior table of the sternum inferiorly.      - However the anterior wall of the RIGHT ventricle including RVOT is closely opposed to the posterior table of the sternum.  The cephalad aspect of this close apposition lies between the fourth and fifth sternal sutures at the RVOT.  The RV remains closely applied to the posterior sternal table inferiorly past the seventh  and most distal sternal suture.  This finding is especially well seen on sagittal series 602 image 39.      There is no convincing evidence of abnormality at the base of the neck.      Blood pool is lower attenuation than the myocardium (32 Hounsfield units versus 41 Hounsfield units) consistent with anemia.  Although no intravenous contrast medium or cardiac gating was used we suspect enlargement of the LEFT ventricle and RIGHT atrium.  There is no pleural or pericardial fluid and no pleural or pericardial calcification.    There is left-sided arch with 3 branch vessels.      There is radiopaque material in semicircular distribution at the anterior and lateral wall of the very proximal ascending aorta or aortic valve annulus (axial series 2 images 25 through 27). Calcification is present in the aortic valve.  There is also a focus of bulky calcification in the fibrous skeleton of the heart at the junction of aortic and mitral valves (axial series 2 images 30 and 31).    We detect no calcific atherosclerosis in the aorta, coronary arteries or other aortic branches.    Measurements of the aorta are limited by the absence of intravenous contrast medium and by transmitted cardiac motion.  Aortic measurements on this noncontrast  study are as follows:    - Mid AAo = 3.8 cm   (axial series 2 image 20); this is less well seen on coronal images because of artifact associated with the catheter in the SVC.    - Distal AAo = 3.7 cm   (sagittal series 602 image 37)    - Aortic arch = 3.7 cm (axial  series 2 image 14).    - Isthmus = 3.1 cm   (sagittal series 602 image 44)    - Prox D'g Ao = 3.0 cm   (sagittal series 602 image 41)    - Distal D'g Ao = 3.0 cm   (sagittal series 602 image 39)    Pulmonary arteries have normal caliber, contour and distribution.  There are four pulmonary veins.  Systemic and pulmonary veno atrial connections are concordant.      There is no lymph node enlargement.    Esophagus maintains normal caliber and course.  The hepatic veins are dilated as might occur with tricuspid insufficiency, elevated right heart pressure causing elevated systemic venous pressure, or both. There is significant volume of ascites incompletely visualized.  There is no bowel distension, free air, biliary dilatation or other significant abnormality involving structures in the upper abdomen.      Osseous structures demonstrate age-appropriate degenerative changes with no fracture or lytic or blastic lesion.  As noted above the patient has undergone remote sternotomy, likely in childhood.  Sternal fragments are united.  Seven sternal wires, three at the manubrium, are intact.    Extrathoracic soft tissues are unremarkable in this slender individual.     Tracheobronchial tree reveals no significant abnormality.    The lungs are symmetrically expanded. There is a 0.3 cm solitary pulmonary nodule within the posterior basal segment of the left lower lobe (axial series 2, image 32).  If the patient has a history of smoking or other significant factor, the Fleischner Society guidelines recommend surveillance with repeat noncontrast chest CT at twelve months (2/2018).  In the absence of such increased risk, those guidelines recommend no further assessment.    Lungs are otherwise clear.            X-Ray Chest 1 View (Final result) Result time:  02/03/17 09:30:20    Final result by Gerald Maldonado Jr., MD (02/03/17 09:30:20)    Narrative:    Chest single view compared to July 2016.  Right-sided dialysis catheter  overlies the SVC.  There is also a right central venous catheter in place.  Heart is enlarged.  Lungs are fairly well aerated.  No pneumothorax.    Impression see above      Electronically signed by: HAJA ONEAL MD  Date:     02/03/17  Time:    09:30                 Assessment/Plan  47 y.o.yo male s/p REPLACEMENT-VALVE-AORTIC (N/A), REDO STERNOTOMY WITH AORTIC VALVE REPLACEMENT/redo sternotomy (N/A), REPAIR-VALVE-TRICUSPID on 2/8/17    Neuro: alert and oriented  - continue po pain control    CV: s/p aortic valve replacement and tricuspid valve repair,   - restarted home labetalol, home hydralazine, made home clonidine dose BID  - wean cardene for stepdown to floor  - minimal CT output, however lines decloted this morning, will re-evaluate ability to DC if output remains low    Pulm: On high flow, not pulling large volumes on IS  No results for input(s): PH, PCO2, PO2, HCO3, POCSATURATED, BE in the last 24 hours.-   pulm  - aggressive work with RT  - IS/A capella/ Pulm toilet  - wean o2 as tolerated    Renal: Some uop responsive to lasix, pt on intermittent CRRT  Recent Labs      02/13/17   0615   BUN  6   CREATININE  1.0   - CRRT ongoing, tolerating well  - Nephrology will attempt HD trial, if tolerated can go to floor  - continues to make urine and is responsive to lasix, will schedule 80 bid  - Nephrology following, appreciate recs    Hem/ID: afebrile, no leukocytosis, h/h stable  Recent Labs      02/13/17   0615   HGB  8.0*   HCT  24.4*   WBC  9.05   - trend labs    Endocrine: euglycemic off of insulin gtt    FEN/GI: renal diet  - no significant electrolyte abnormalities   - trend labs    Dispo: Will attempt to stepdown today if pt stays off cardene and tolerated HD trial      Jose Viramontes MD PGY II  [phone]

## 2017-02-13 NOTE — PROGRESS NOTES
Progress Note  Nephrology    Admit Date: 2/2/2017   LOS: 11 days     SUBJECTIVE:     Follow-up For:  ESRD on HD     Interval follow up: NAEON. Patient on CRRT at the time of evaluation with . Patient net - 6.3 L since yesterday. Urine output 900 despite on CRRT. CVP 11. On comfrot flow at the time of evaluation. Patient to be stepdown today.     OBJECTIVE:     Vital Signs (Most Recent)  Temp: 97.8 °F (36.6 °C) (02/13/17 1100)  Pulse: 101 (02/13/17 1345)  Resp: 16 (02/13/17 1345)  BP: 119/79 (02/13/17 1300)  SpO2: (!) 94 % (02/13/17 1345)    Vital Signs Range (Last 24H):  Temp:  [97.8 °F (36.6 °C)-99 °F (37.2 °C)]   Pulse:  []   Resp:  [0-29]   BP: ()/(55-79)   SpO2:  [86 %-98 %]   Arterial Line BP: ()/(52-68)       Intake/Output Summary (Last 24 hours) at 02/13/17 1403  Last data filed at 02/13/17 1300   Gross per 24 hour   Intake          8389.68 ml   Output            12585 ml   Net         -3823.32 ml     Physical Exam:  General: Well developed, well nourished  HEENT: Conjunctiva clear; EOMs clear   Neck: No JVD noted, Supple  Chest : chest tubes in place, surgical incision dressing c/d/i  CV- Normal S1, S2 with no murmurs,gallops,rubs  Resp- crackles b/l , Unlabored  Abdomen- NTND, BS heard, soft  Extrem- No cyanosis, clubbing, edema.  Skin- No rashes, lesions, ulcers  Neuro: AO x4, no FND   Access: permcath     Laboratory:  CBC:     Recent Labs  Lab 02/13/17  0615   WBC 9.05   RBC 2.65*   HGB 8.0*   HCT 24.4*   *   MCV 92   MCH 30.2   MCHC 32.8     CMP:     Recent Labs  Lab 02/13/17  0615   GLU 99   CALCIUM 8.4*   ALBUMIN 2.3*   PROT 6.7      K 4.5   CO2 25      BUN 6   CREATININE 1.0   ALKPHOS 59   ALT 5*   AST 39   BILITOT 1.4*     ABGs:     Recent Labs  Lab 02/10/17  1136   PH 7.345*   PCO2 35.1   HCO3 19.1*   POCSATURATED 95   BE -7       Diagnostic Results:  Labs: Reviewed  X-Ray: Reviewed    ASSESSMENT/PLAN:     47 y.o. AAM with h/o ASD repair at 9 yo, aortic  stenosis with CHF wirh rEF, pulmonary HTN, active hep B transferred from [facility] for surgery eval for AVR.     Now s/p redo sternotomy with  AVR and Tricuspid valve repair (2/8/2017)     CKD 5 progressed to ESRD on IHD MWF / TTS    - Patient on comfort flow today at the time of evaluation   - On CRRT and tolerating it well  - Net neg 6.1 L yest   - Lytes and acid base stable   - Since patient to be step down today will do HD trial today and could be stepdown afterwards  - Target UF 2L as tolerated    - Continue lasix 80 mg BID   - Monitor strict I/Os, daily weights  - Will continue to monitor.    Patrick Ferrera   Nephrology fellow PGY- 5  [phone]

## 2017-02-13 NOTE — PROGRESS NOTES
Dr. Viramontes aware that pt desating to 86% on 12L highflow nasal cannula. MD aware of all VS, CVP of 18, UOP, CRRT, and gtts. Order received for chest xray. Will continue to monitor pt closely. MD also made awrae that despite all PRN pain meds, antihypertensive, and schedulded antihypertensives, and 15mg of cardene, Pts MAP remain 85s. Order received. Will carryout order and continue to monitor pt closely

## 2017-02-13 NOTE — PLAN OF CARE
Problem: Patient Care Overview  Goal: Plan of Care Review  PHM: CHF, pulmonary HTN, CKD3, Hep B    PSH: ASD repair 7 y/o    Hospital Course:    2/8: Admit SICU Aortic valve replacement, Tricuspid valve repair, Redo sternotomy; 1L albumin, 2 amps bicarb, 2 unit PRBC  2/9: 2 PRBC, 1 amp bicarb, 1 500 5%Albumin, 1-100mL 25 %Albumin Levo/Milrinone off, wean Epi/Vaso, Heparin gtt started    Nursing:  * Accucheck Q6h  *Renal Function Panel q8h  *PTT q6h goal 60-80   Outcome: Ongoing (interventions implemented as appropriate)  VSS throughout shift; weaned off cardene.  Transitioned from CRRT to HD.  UO remains low.  Toerating diet.  Pain managed with PRN pain meds.  Heparin gtt continued with goal aPTT 60-80.  SpO2 >92% on comfort flow.  Turned q2h and bed kept free of debris.  Plan is stepdown to [facility].

## 2017-02-13 NOTE — PROGRESS NOTES
Dialysis,  Hemofiltration for 2 hours with a 2 liter target.  Tx initiated via  Right tunneled IJ. Pt aware of tx and goals.

## 2017-02-13 NOTE — PROGRESS NOTES
[facility]  Adult Nutrition  Consult Note    SUMMARY     Recommendations    Recommendation/Intervention: 1. Cont current diet order; FR per MD  2. Enc pt to have adequate meal intake daily; enc'd him to focus on high protein foods first  3. RD to offer Renal diet educ prior to discharge   4. RD to monitor  Goals: Maintain meal intake >/=75%  Nutrition Goal Status: new       Continuum of Care Plan    Referral to Outpatient Services: other (see comments) (Nutr D/c Plan: unable to determine @ this time)    Reason for Assessment    Reason for Assessment: RD follow-up  Diagnosis: cardiac disease, other (see comments) (s/p AVR/TVR 2/8/17)  Relevent Medical History: AS w/ CHF, pulm HTN, CKD stg 3, Hep B   Interdisciplinary Rounds: attended     General Information Comments: Pt extubated & on HFNC. On CRRT; to step down to floor if tolerates HD. Visited w/ pt who reports a fair appetite; ate ~50% of breakfast this a.m. w/ good tolerance. LBM 2/4; voices + flatus    Nutrition Prescription Ordered    Current Diet Order: Renal  Nutrition Order Comments: 1200 ml FR        Evaluation of Received Nutrients/Fluid Intake     Energy Calories Required: not meeting needs            Protein Required: not meeting needs        IV Fluid (mL):  (IVF w/ CRRT)  Other Fluid (mL):  (IVPB meds)      Fluid Required: other (see comments) (per MD)  Comments: I/O noted; -2.7L since admit        Nutrition Risk Screen     Nutrition Risk Screen: no indicators present    Nutrition/Diet History       Typical Food/Fluid Intake: adequate prior to NPO/sx per documentation  Food Preferences: JENNIFER        Factors Affecting Nutritional Intake: other (see comments) (none @ this time)        Labs/Tests/Procedures/Meds       Pertinent Labs Reviewed: reviewed  Pertinent Labs Comments: tbili 1.4, lytes wnl  Pertinent Medications Reviewed: reviewed  Pertinent Medications Comments: heparin, cardene, Vit C, furosemide, warfarin    Physical  Findings    Overall Physical Appearance: other (see comments) (nourished)  Tubes: other (see comments) (none)     Skin: other (see comments) (Ky 17; sx incisions)    Anthropometrics       Height (inches): 74.02 in  Weight Method: Bed Scale  Weight (kg): 98.1 kg  Ideal Body Weight (IBW), Male: 190.12 lb     % Ideal Body Weight, Male (lb): 113.75 lb     BMI (kg/m2): 27.76  BMI Grade: 25 - 29.9 - overweight  Usual Body Weight (UBW), kg:  (unable to obtain)           Estimated/Assessed Needs    Weight Used For Calorie Calculations: 98.1 kg (216 lb 4.3 oz)   Height (cm): 188 cm     Energy Need Method: Houghton-St Jeor (x 1.25 (PAL) = 2417 cals daily)     RMR (Houghton-St. Jeor Equation): 1933.7        Weight Used For Protein Calculations: 98.1 kg (216 lb 4.3 oz)  Protein Requirements: 108-118 gms (1.1-1.2 gms/kg)    Fluid Need Method: other (see comments) (per MD)            Monitor and Evaluation    Food and Nutrient Intake: energy intake, food and beverage intake  Food and Nutrient Adminstration: diet order     Physical Activity and Function: nutrition-related ADLs and IADLs  Anthropometric Measurements: weight, weight change  Biochemical Data, Medical Tests and Procedures: electrolyte and renal panel, glucose/endocrine profile, inflammatory profile  Nutrition-Focused Physical Findings: overall appearance    Nutrition Risk    Level of Risk: moderate    Nutrition Follow-Up    RD Follow-up?: Yes    Assessment and Plan    Nutrition dx/problem: Increased nutrient needs  Related to/etiology: physiological factors s/p cardiac sx  As evidenced by: EEN/EPN  Status: Continues

## 2017-02-14 LAB
ANION GAP SERPL CALC-SCNC: 9 MMOL/L
APTT BLDCRRT: 31.8 SEC
BASOPHILS # BLD AUTO: 0.02 K/UL
BASOPHILS NFR BLD: 0.2 %
BUN SERPL-MCNC: 23 MG/DL
CALCIUM SERPL-MCNC: 8.6 MG/DL
CHLORIDE SERPL-SCNC: 103 MMOL/L
CO2 SERPL-SCNC: 23 MMOL/L
CREAT SERPL-MCNC: 2.6 MG/DL
DIFFERENTIAL METHOD: ABNORMAL
EOSINOPHIL # BLD AUTO: 0.3 K/UL
EOSINOPHIL NFR BLD: 2.8 %
ERYTHROCYTE [DISTWIDTH] IN BLOOD BY AUTOMATED COUNT: 15.7 %
EST. GFR  (AFRICAN AMERICAN): 32.7 ML/MIN/1.73 M^2
EST. GFR  (NON AFRICAN AMERICAN): 28.3 ML/MIN/1.73 M^2
GLUCOSE SERPL-MCNC: 98 MG/DL
HAV IGM SERPL QL IA: NEGATIVE
HBV CORE AB SERPL QL IA: POSITIVE
HBV SURFACE AB SER-ACNC: NEGATIVE M[IU]/ML
HBV SURFACE AG SERPL QL IA: POSITIVE
HCT VFR BLD AUTO: 24.1 %
HGB BLD-MCNC: 7.8 G/DL
INR PPP: 1.9
LYMPHOCYTES # BLD AUTO: 1.5 K/UL
LYMPHOCYTES NFR BLD: 16.8 %
MAGNESIUM SERPL-MCNC: 2.4 MG/DL
MCH RBC QN AUTO: 30.1 PG
MCHC RBC AUTO-ENTMCNC: 32.4 %
MCV RBC AUTO: 93 FL
MONOCYTES # BLD AUTO: 1 K/UL
MONOCYTES NFR BLD: 10.7 %
NEUTROPHILS # BLD AUTO: 6.3 K/UL
NEUTROPHILS NFR BLD: 69.4 %
PHOSPHATE SERPL-MCNC: 3.6 MG/DL
PLATELET # BLD AUTO: 119 K/UL
PMV BLD AUTO: 10.2 FL
POCT GLUCOSE: 110 MG/DL (ref 70–110)
POCT GLUCOSE: 146 MG/DL (ref 70–110)
POTASSIUM SERPL-SCNC: 4.8 MMOL/L
PROTHROMBIN TIME: 19.5 SEC
RBC # BLD AUTO: 2.59 M/UL
SODIUM SERPL-SCNC: 135 MMOL/L
WBC # BLD AUTO: 9.01 K/UL

## 2017-02-14 PROCEDURE — 80048 BASIC METABOLIC PNL TOTAL CA: CPT

## 2017-02-14 PROCEDURE — 63600175 PHARM REV CODE 636 W HCPCS: Performed by: STUDENT IN AN ORGANIZED HEALTH CARE EDUCATION/TRAINING PROGRAM

## 2017-02-14 PROCEDURE — 94640 AIRWAY INHALATION TREATMENT: CPT

## 2017-02-14 PROCEDURE — 25000242 PHARM REV CODE 250 ALT 637 W/ HCPCS: Performed by: THORACIC SURGERY (CARDIOTHORACIC VASCULAR SURGERY)

## 2017-02-14 PROCEDURE — 80100016 HC MAINTENANCE HEMODIALYSIS

## 2017-02-14 PROCEDURE — 84100 ASSAY OF PHOSPHORUS: CPT

## 2017-02-14 PROCEDURE — 20600001 HC STEP DOWN PRIVATE ROOM

## 2017-02-14 PROCEDURE — 25000003 PHARM REV CODE 250: Performed by: STUDENT IN AN ORGANIZED HEALTH CARE EDUCATION/TRAINING PROGRAM

## 2017-02-14 PROCEDURE — 94664 DEMO&/EVAL PT USE INHALER: CPT

## 2017-02-14 PROCEDURE — 97116 GAIT TRAINING THERAPY: CPT

## 2017-02-14 PROCEDURE — 85610 PROTHROMBIN TIME: CPT

## 2017-02-14 PROCEDURE — 25000003 PHARM REV CODE 250: Performed by: NURSE PRACTITIONER

## 2017-02-14 PROCEDURE — 94799 UNLISTED PULMONARY SVC/PX: CPT

## 2017-02-14 PROCEDURE — 27000221 HC OXYGEN, UP TO 24 HOURS

## 2017-02-14 PROCEDURE — 25000003 PHARM REV CODE 250: Performed by: THORACIC SURGERY (CARDIOTHORACIC VASCULAR SURGERY)

## 2017-02-14 PROCEDURE — 25000003 PHARM REV CODE 250: Performed by: INTERNAL MEDICINE

## 2017-02-14 PROCEDURE — 85025 COMPLETE CBC W/AUTO DIFF WBC: CPT

## 2017-02-14 PROCEDURE — 83735 ASSAY OF MAGNESIUM: CPT

## 2017-02-14 PROCEDURE — 97530 THERAPEUTIC ACTIVITIES: CPT

## 2017-02-14 PROCEDURE — 85730 THROMBOPLASTIN TIME PARTIAL: CPT

## 2017-02-14 PROCEDURE — 90935 HEMODIALYSIS ONE EVALUATION: CPT

## 2017-02-14 PROCEDURE — 99900035 HC TECH TIME PER 15 MIN (STAT)

## 2017-02-14 RX ORDER — SODIUM CHLORIDE 9 MG/ML
INJECTION, SOLUTION INTRAVENOUS
Status: DISCONTINUED | OUTPATIENT
Start: 2017-02-14 | End: 2017-02-16

## 2017-02-14 RX ORDER — SODIUM CHLORIDE 9 MG/ML
INJECTION, SOLUTION INTRAVENOUS ONCE
Status: DISCONTINUED | OUTPATIENT
Start: 2017-02-14 | End: 2017-02-15

## 2017-02-14 RX ADMIN — OXYCODONE HYDROCHLORIDE 5 MG: 5 TABLET ORAL at 12:02

## 2017-02-14 RX ADMIN — FUROSEMIDE 80 MG: 10 INJECTION, SOLUTION INTRAMUSCULAR; INTRAVENOUS at 08:02

## 2017-02-14 RX ADMIN — IPRATROPIUM BROMIDE AND ALBUTEROL SULFATE 3 ML: .5; 3 SOLUTION RESPIRATORY (INHALATION) at 04:02

## 2017-02-14 RX ADMIN — LABETALOL HCL 300 MG: 300 TABLET, FILM COATED ORAL at 09:02

## 2017-02-14 RX ADMIN — IPRATROPIUM BROMIDE AND ALBUTEROL SULFATE 3 ML: .5; 3 SOLUTION RESPIRATORY (INHALATION) at 11:02

## 2017-02-14 RX ADMIN — ASPIRIN 81 MG: 81 TABLET, COATED ORAL at 08:02

## 2017-02-14 RX ADMIN — SILDENAFIL 20 MG: 20 TABLET ORAL at 09:02

## 2017-02-14 RX ADMIN — HEPARIN SODIUM AND DEXTROSE 1200 UNITS/HR: 10000; 5 INJECTION INTRAVENOUS at 02:02

## 2017-02-14 RX ADMIN — IPRATROPIUM BROMIDE AND ALBUTEROL SULFATE 3 ML: .5; 3 SOLUTION RESPIRATORY (INHALATION) at 08:02

## 2017-02-14 RX ADMIN — WARFARIN SODIUM 2 MG: 2 TABLET ORAL at 07:02

## 2017-02-14 RX ADMIN — POLYETHYLENE GLYCOL 3350 17 G: 17 POWDER, FOR SOLUTION ORAL at 08:02

## 2017-02-14 RX ADMIN — OXYCODONE HYDROCHLORIDE AND ACETAMINOPHEN 500 MG: 500 TABLET ORAL at 08:02

## 2017-02-14 RX ADMIN — OXYCODONE HYDROCHLORIDE AND ACETAMINOPHEN 500 MG: 500 TABLET ORAL at 09:02

## 2017-02-14 RX ADMIN — OXYMETAZOLINE HYDROCHLORIDE 2 SPRAY: 5 SPRAY NASAL at 09:02

## 2017-02-14 RX ADMIN — OXYCODONE HYDROCHLORIDE 10 MG: 5 TABLET ORAL at 08:02

## 2017-02-14 RX ADMIN — SILDENAFIL 20 MG: 20 TABLET ORAL at 05:02

## 2017-02-14 RX ADMIN — CLONIDINE HYDROCHLORIDE 0.1 MG: 0.1 TABLET ORAL at 05:02

## 2017-02-14 RX ADMIN — LABETALOL HCL 300 MG: 300 TABLET, FILM COATED ORAL at 05:02

## 2017-02-14 RX ADMIN — HEPARIN SODIUM 1000 UNITS: 1000 INJECTION, SOLUTION INTRAVENOUS; SUBCUTANEOUS at 05:02

## 2017-02-14 RX ADMIN — AMLODIPINE BESYLATE 10 MG: 10 TABLET ORAL at 08:02

## 2017-02-14 RX ADMIN — OXYCODONE HYDROCHLORIDE 10 MG: 5 TABLET ORAL at 01:02

## 2017-02-14 RX ADMIN — OXYCODONE HYDROCHLORIDE 5 MG: 5 TABLET ORAL at 07:02

## 2017-02-14 RX ADMIN — HYDRALAZINE HYDROCHLORIDE 100 MG: 50 TABLET ORAL at 09:02

## 2017-02-14 RX ADMIN — HYDRALAZINE HYDROCHLORIDE 100 MG: 50 TABLET ORAL at 05:02

## 2017-02-14 RX ADMIN — IPRATROPIUM BROMIDE AND ALBUTEROL SULFATE 3 ML: .5; 3 SOLUTION RESPIRATORY (INHALATION) at 09:02

## 2017-02-14 RX ADMIN — CLONIDINE HYDROCHLORIDE 0.1 MG: 0.1 TABLET ORAL at 09:02

## 2017-02-14 NOTE — UM SECONDARY REVIEW
CM Leadership    Discharge Planning Concern    Treatment/Care recommendations given     Pt needs dialysis and uninsured.

## 2017-02-14 NOTE — PLAN OF CARE
Problem: Patient Care Overview  Goal: Plan of Care Review  PHM: CHF, pulmonary HTN, CKD3, Hep B    PSH: ASD repair 7 y/o    Hospital Course:    2/8: Admit SICU Aortic valve replacement, Tricuspid valve repair, Redo sternotomy; 1L albumin, 2 amps bicarb, 2 unit PRBC  2/9: 2 PRBC, 1 amp bicarb, 1 500 5%Albumin, 1-100mL 25 %Albumin Levo/Milrinone off, wean Epi/Vaso, Heparin gtt started    Nursing:  * Accucheck Q6h  *Renal Function Panel q8h  *PTT q6h goal 60-80   Outcome: Ongoing (interventions implemented as appropriate)  Plan of care discussed with patient, no new questions at this time. VSS, denies SOB, pain managed throughout the shift. Bleeding precautions taken and pt being for bleeding. Safety precautions taken, no falls/trauma during the shift. Will continue to monitor.

## 2017-02-14 NOTE — PLAN OF CARE
Problem: Patient Care Overview  Goal: Plan of Care Review  PHM: CHF, pulmonary HTN, CKD3, Hep B    PSH: ASD repair 9 y/o    Hospital Course:    2/8: Admit SICU Aortic valve replacement, Tricuspid valve repair, Redo sternotomy; 1L albumin, 2 amps bicarb, 2 unit PRBC  2/9: 2 PRBC, 1 amp bicarb, 1 500 5%Albumin, 1-100mL 25 %Albumin Levo/Milrinone off, wean Epi/Vaso, Heparin gtt started    Nursing:  * Accucheck Q6h  *Renal Function Panel q8h  *PTT q6h goal 60-80   Outcome: Ongoing (interventions implemented as appropriate)  POC reviewed with patient and family with all verbalizing understanding. VSS and pain being managed with PRN medication. Blood sugar being monitored q6 with sliding insulin ordered. Heparin gtt DC'ed d/t continuous nose bleed. ENT consulted, Afrin sprayed into nostril, pressure held for 5 min, and bleeding stopped. Patient weaned from simple facemask at 5L to nasal cannula with humidification at 2L to keep sats above 88%. Patient on continuous pulse ox for better monitoring. Pleural chest tube removed by MD, meds still intact and attached to -20 suctioning. [place] to be removed during shift. Patient in no apparent sign of distress, will continue to monitor.

## 2017-02-14 NOTE — NURSING TRANSFER
Nursing Transfer Note      2/14/2017     Transfer to CTSU 307 from SICU 6049    Transfer via hospital bed    Transfer with O2 tank, portable telemetry box    Transported by Ruma Fischer, RN, and PCT    Medicines sent: Labetalol injection vials x2, oxymetazoline spray    Chart send with patient: yes    Notified: Kelvin CTSU RN; sister notified via phone earlier during shift    Patient reassessed at: 0000 upon arrival to CTSU. Pt tolerated transfer well, currently on NRB to facilitate comfort with nasal packing in left naris. Pt in NAD at this time.

## 2017-02-14 NOTE — PROGRESS NOTES
Packing in L nare is saturated. changed every 10-15 minutes due to leaking on patient. MD (Dr. Soto) notified. Afrin also given and patient not responding. MD ordered to stop heparin gtt and he will come see patient.

## 2017-02-14 NOTE — PROGRESS NOTES
L nare still with minimal bleeding noted, packing removed and L nare re-packed per on call CTS MD instructions. Also instructed to initiate Heparin gtt at 1200units/hr and frequently monitor pt for increased bleeding from L nare. Instructions carried out. NADN. Pt resting quietly in bed voicing no complaints at this time. Will continue to monitor.

## 2017-02-14 NOTE — PROGRESS NOTES
Spoke with Dr. Soto regarding pt's MAP continuing to increase; clonidine was given.  Order parameters changed to goal SBP <130.  MD also notified of pt's UO not responding to lasix admin.

## 2017-02-14 NOTE — PLAN OF CARE
Pt will need coumadin monitoring due to mechanical valve. Pt is medicaid pending and SW working on outpt dialysis chair. CM called [phone] [facility] to see if able to provided health clinic in [place]. They provided [facility] [phone]. [facility] in [place] [phone]. Pt can take ferry. [facility] in [place] [phone]. CM went to see pt. Pt resting quietly sitting in chair with eyes closed. Maggie/sister [phone]. VERONICA informed her pt will need coumadin monitoring for mechanical valve. VERONICA informed her will call these clinics to get pt set up. She spoke with someone over the phone and they stated pt see's Dr. Murray in [facility]. [address], [place]. VERONICA looked up Md phone # [phone]. Will follow up.

## 2017-02-14 NOTE — PLAN OF CARE
CM called [facility] [phone]. VERONICA spoke with Mindy. She stated she is familiar with pt. CM informed her pt is S/P AVR(mechanical) AVR, Redo-sternotomy on 2/8/17. VERONICA asked if Dr. Wiley will be able to monitor pt PT/INR. She states Dr. Wiley would be happy to but, unable to due to pt being medicaid pending. She stated that if pt is able to get insurance can call back. VERONICA will cont to try to find clinic.

## 2017-02-14 NOTE — NURSING TRANSFER
Nursing Transfer Note      2/14/2017     Transfer SICU to     Transfer via bed    Transfer with  O2, cardiac monitoring    Transported by RN staff    Medicines sent: yes    Chart send with patient: Yes    Notified: pt relative    Patient reassessed at: 02/14/17 0000    Upon arrival to floor: cardiac monitor applied, patient oriented to room, call bell in reach and bed in lowest position    Patient arrived to unit by ICU RN. Pt receiving 02 via non-re breather mask and Left nare packed with gauze. Pt has bhandari and SCD's. Patient complain of incisional pain, prn pain medication given. VSS, denies SOB, and no acute distress. Will continue to monitor.

## 2017-02-14 NOTE — PLAN OF CARE
Problem: Physical Therapy Goal  Goal: Physical Therapy Goal  Goals to be met by: 17    Patient will increase functional independence with mobility by performin. Supine to sit with Stand-by Assistance - not met  2. Sit to stand transfer with Supervision - not met  3. Gait x 220 feet with Supervision - not met    Pt continues to progress towards goals.  KORIN Ryo

## 2017-02-14 NOTE — PLAN OF CARE
LEI was contacted by [facility] regarding pt.  LEI informed them that the SW was waiting on the Hep panel to result.  They sent the SW a fax regarding questions that needed to be asked to the pt as he is unfunded.  They do not accept medicaid pending pts.  LEI asked the pt's sister as the pt was just taken to HD.  LEI also asked the pt's sister if the SW could contact [facility] in [place] because they may be willing to work out a contract with the hospital for outpt HD.  She was agreeable to this.  LEI faxed to [facility] the referral ([phone], [phone]).  LEI spoke with Lisseth in [facility].  SHe stated that the pt qualifies for medicaid spend down but not full medicaid.  It will cover the hospital stay but not his outpt HD.  Pt likely makes too much money with his job.  Pt's sister stated that the pt will not be returning to work.  LEI informed Lisseth about this. She stated that the pt can call Medicaid ([phone]) in March to ask them to do a new application.  LEI attempted to provide the pt's sister with this information but she had left the room.  LEI will f/u tomorrow.    Yue Garibay, [facility] x [phone]

## 2017-02-14 NOTE — NURSING TRANSFER
Nursing Transfer Note      2/14/2017     Transfer To: Dialysis    Transfer via stretcher    Transfer with cardiac monitoring    Transported by Patient care escort    Medicines sent: no    Chart send with patient: No    Notified: Sister

## 2017-02-14 NOTE — PROGRESS NOTES
Progress Note  Cardiothoracic Surgery    Admit Date: 2/2/2017  Post-operative Day: 6 Days Post-Op  Hospital Day: 13    SUBJECTIVE: Pt had epitaxis to left nare last night which has resolved now after another dose of afrin and holding pressure. Packing was removed and no more bleeding. Pt NSR on monitor. Weaned down to 1LNC     Follow-up For: Procedure(s) (LRB):  REPLACEMENT-VALVE-AORTIC (N/A)  REDO STERNOTOMY WITH AORTIC VALVE REPLACEMENT/redo sternotomy (N/A)  REPAIR-VALVE-TRICUSPID    Scheduled Meds:   albuterol-ipratropium 2.5mg-0.5mg/3mL  3 mL Nebulization Q4H    amlodipine  10 mg Oral Daily    ascorbic acid (vitamin C)  500 mg Oral BID    aspirin  81 mg Oral Daily    cloNIDine  0.1 mg Oral TID    epoetin vikki (PROCRIT) injection  10,000 Units Intravenous Every Mon, Wed, Fri    ferric gluconate (FERRLECIT) IVPB  125 mg Intravenous Every Mon, Wed, Fri    furosemide  80 mg Intravenous BID    hydrALAZINE  100 mg Oral Q8H    labetalol  300 mg Oral Q8H    oxymetazoline  2 spray Each Nare BID    polyethylene glycol  17 g Per G Tube Daily    sildenafil  20 mg Oral TID    warfarin  2 mg Oral Daily     Infusions/Drips:   sodium chloride 0.45% 10 mL/hr (02/09/17 0800)     PRN Meds: sodium chloride, sodium chloride, sodium chloride 0.9%, acetaminophen, dextrose 50%, glucagon (human recombinant), heparin (porcine), hydrALAZINE, insulin aspart, labetalol, lactulose, metoclopramide HCl, morphine, ondansetron, oxycodone, oxycodone    Review of patient's allergies indicates:  No Known Allergies    OBJECTIVE:     Vital Signs (Most Recent)  Temp: 99.6 °F (37.6 °C) (02/14/17 0730)  Pulse: 94 (02/14/17 0900)  Resp: 16 (02/14/17 0930)  BP: 123/80 (02/14/17 0730)  SpO2: (!) 90 % (02/14/17 0930)    Admission Weight: 86.3 kg (190 lb 4.1 oz) (02/02/17 2035)   Most Recent Weight: 98.1 kg (216 lb 4.3 oz) (02/10/17 0556)    Vital Signs Range (Last 24H):  Temp:  [97.8 °F (36.6 °C)-99.6 °F (37.6 °C)]   Pulse:  []   Resp:   [10-30]   BP: ()/(66-80)   SpO2:  [87 %-100 %]   Arterial Line BP: ()/(60-74)     I & O (Last 24H):  Intake/Output Summary (Last 24 hours) at 02/14/17 1054  Last data filed at 02/14/17 0500   Gross per 24 hour   Intake             2327 ml   Output             4401 ml   Net            -2074 ml     Physical Exam:  General: fatigued, no distress  Nose: no discharge, no crusting or bleeding points  Lungs:  clear to auscultation bilaterally and normal respiratory effort  Chest Wall: no tenderness  Heart: regular rate and rhythm, S1, S2 normal, no murmur, rub or gallop  Abdomen: soft nontender   Skin: Skin color, texture, turgor normal. No rashes or lesions  Neurologic: Alert and oriented. Thought content appropriate    Wound/Incision:  clean, dry, intact    Laboratory:  CBC:   Recent Labs  Lab 02/14/17  0339   WBC 9.01   RBC 2.59*   HGB 7.8*   HCT 24.1*   *   MCV 93   MCH 30.1   MCHC 32.4     BMP:   Recent Labs  Lab 02/14/17 0339   GLU 98   *   K 4.8      CO2 23   BUN 23*   CREATININE 2.6*   CALCIUM 8.6*   MG 2.4     Coagulation:   Recent Labs  Lab 02/14/17 0339   INR 1.9*   APTT 31.8       Diagnostic Results:  X-Ray: Reviewed    ASSESSMENT/PLAN:     Assessment:   Active Hospital Problems    Diagnosis    *S/P AVR (aortic valve replacement)    ESRD (end stage renal disease)    Hyperglycemia    Postprocedural hypotension    Nonrheumatic aortic valve stenosis    Adult congenital heart disease    Pulmonary HTN    CKD stage 5 secondary to hypertension    Uncontrolled hypertension    Hypertensive cardiomegaly with heart failure     Plan:   Sternal Precautions  PT/OT  Ambulate  S/p AVR: continue asa, lopressor and anticoagulation   D/c heparin with nose bleed  ESRD: Pt tolerated HD and Nephro following. Continue lasix  Pulmonary HTN: sildenafil  HTN: continue norvasc, clonidine, hydralazine, labetalol and prns  Discharge planning ongoing-SNF/Rehab

## 2017-02-14 NOTE — PT/OT/SLP PROGRESS
Physical Therapy  Treatment    Isidro White   MRN: [id number]   Admitting Diagnosis: S/P AVR (aortic valve replacement)    PT Received On: 02/14/17  PT Start Time: 1008     PT Stop Time: 1035    PT Total Time (min): 27 min       Billable Minutes:  Gait [id number] and Therapeutic Activity 11       PT/PTA: PTA     PTA Visit Number: 1       General Precautions: Standard, sternal, fall    Do you have any cultural, spiritual, Church conflicts, given your current situation?:  (none)    Subjective:  Communicated with RN prior to session.  Pt said he was eating breakfast   Pt and sister stated he didn't sleep well last night 2nd to nose bleed    Pain Rating:  (pt had a hard time communicating, appeared to be in pain but didnt rate or state where the pain was.)     Objective:   Patient found with: bhandari catheter, chest tube, telemetry, pulse ox (continuous), oxygen    Functional Mobility:  Bed Mobility:   Rolling/Turning to Left: Minimum assistance (Elisabet sliding feet off bed,constant VC's to initiate movement while adhering to sternal precautions.  Scooting/Bridging: Minimum Assistance (VC's on proper technique given)  Supine to Sit: Minimum Assistance     Transfers:  Sit <> Stand Assistance: Minimum Assistance  Sit <> Stand Assistive Device: No Assistive Device    Gait:   Gait Distance: 100 ft (2 LOB w/ Elisabet to correct)  Assistance 1: Contact Guard Assistance  Gait Assistive Device: No device  Gait Pattern: 2-point gait  Gait Deviation(s): decreased lena, decreased step length, decreased stride length  (Pt O2 on 3L was measured at 85% sat after ambulating and bumped to 4L until sat. Returned to 92%, decreased to 3L.)    Balance:   Static Sit: POOR+: Needs MINIMAL assist to maintain  Dynamic Sit: FAIR+: Maintains balance through MINIMAL excursions of active trunk motion  Static Stand: POOR+: Needs MINIMAL assist to maintain  Dynamic stand: FAIR: Needs CONTACT GUARD during gait     Therapeutic Activities and  Exercises:  Sternal precautions were reviewed with pt and reinforced during functional mobility.  Pt educated on proper technique for bed mobility w/ sternal precautions and will need further training  Pt educated on deep breathing exercises during ambulation.    AM-PAC 6 CLICK MOBILITY  How much help from another person does this patient currently need?   1 = Unable, Total/Dependent Assistance  2 = A lot, Maximum/Moderate Assistance  3 = A little, Minimum/Contact Guard/Supervision  4 = None, Modified Camp/Independent    Turning over in bed (including adjusting bedclothes, sheets and blankets)?: 3  Sitting down on and standing up from a chair with arms (e.g., wheelchair, bedside commode, etc.): 3  Moving from lying on back to sitting on the side of the bed?: 3  Moving to and from a bed to a chair (including a wheelchair)?: 3  Need to walk in hospital room?: 3  Climbing 3-5 steps with a railing?: 2  Total Score: 17    AM-PAC Raw Score CMS G-Code Modifier Level of Impairment Assistance   6 % Total / Unable   7 - 9 CM 80 - 100% Maximal Assist   10 - 14 CL 60 - 80% Moderate Assist   15 - 19 CK 40 - 60% Moderate Assist   20 - 22 CJ 20 - 40% Minimal Assist   23 CI 1-20% SBA / CGA   24 CH 0% Independent/ Mod I     Patient left up in chair with call button in reach.    Assessment:  Isidro White is a 47 y.o. male with a medical diagnosis of S/P AVR (aortic valve replacement) and presents with problems listed below. Pt needed motivation to participate in therapy today w/ help of sister and RN. Will continue to benefit from gait training, and bed mobility education to maintain safer and more efficient transfers while adhering to sternal precautions.     Rehab identified problem list/impairments: Rehab identified problem list/impairments: weakness, impaired endurance, impaired functional mobilty, impaired balance, impaired coordination, gait instability    Rehab potential is good.    Activity tolerance:  Good    Discharge recommendations: Discharge Facility/Level Of Care Needs:  (no further PT will be needed )     Barriers to discharge: Barriers to Discharge: None    Equipment recommendations: Equipment Needed After Discharge: none     GOALS:   Physical Therapy Goals        Problem: Physical Therapy Goal    Goal Priority Disciplines Outcome Goal Variances Interventions   Physical Therapy Goal     PT/OT, PT      Description:  Goals to be met by: 17    Patient will increase functional independence with mobility by performin. Supine to sit with Stand-by Assistance - not met  2. Sit to stand transfer with Supervision - not met  3. Gait  x 220 feet with Supervision  - not met                 PLAN:    Patient to be seen 6 x/week  to address the above listed problems via gait training, therapeutic activities  Plan of Care expires: 17  Plan of Care reviewed with: patient, sibling         Jose Otero, KORIN  2017

## 2017-02-14 NOTE — PLAN OF CARE
Problem: Hemodialysis (Adult)  Intervention: Protect/Monitor Dialysis Access Site  Dialysis access site precautions maintained

## 2017-02-14 NOTE — PROGRESS NOTES
MELVI Campos, NP at bedside.  Discussed pt's MAPs in 80s-90s.  PRN meds given.  Orders received for additional dose of clonidine.  NP updated on pt's UO remaining low while on dialysis; lasix to be given.  Per NP, ok for pt to go to transfer to floor.

## 2017-02-15 LAB
ANION GAP SERPL CALC-SCNC: 8 MMOL/L
APTT BLDCRRT: 35 SEC
BASOPHILS # BLD AUTO: 0.02 K/UL
BASOPHILS NFR BLD: 0.2 %
BUN SERPL-MCNC: 26 MG/DL
CALCIUM SERPL-MCNC: 8.4 MG/DL
CHLORIDE SERPL-SCNC: 102 MMOL/L
CO2 SERPL-SCNC: 24 MMOL/L
CREAT SERPL-MCNC: 3.2 MG/DL
DIFFERENTIAL METHOD: ABNORMAL
EOSINOPHIL # BLD AUTO: 0.4 K/UL
EOSINOPHIL NFR BLD: 4.9 %
ERYTHROCYTE [DISTWIDTH] IN BLOOD BY AUTOMATED COUNT: 15.6 %
EST. GFR  (AFRICAN AMERICAN): 25.5 ML/MIN/1.73 M^2
EST. GFR  (NON AFRICAN AMERICAN): 22 ML/MIN/1.73 M^2
GLUCOSE SERPL-MCNC: 94 MG/DL
HCT VFR BLD AUTO: 23.8 %
HGB BLD-MCNC: 7.8 G/DL
INR PPP: 2.5
LYMPHOCYTES # BLD AUTO: 1 K/UL
LYMPHOCYTES NFR BLD: 11.6 %
MAGNESIUM SERPL-MCNC: 1.9 MG/DL
MCH RBC QN AUTO: 29.9 PG
MCHC RBC AUTO-ENTMCNC: 32.8 %
MCV RBC AUTO: 91 FL
MONOCYTES # BLD AUTO: 1.5 K/UL
MONOCYTES NFR BLD: 18.2 %
NEUTROPHILS # BLD AUTO: 5.5 K/UL
NEUTROPHILS NFR BLD: 64.9 %
PHOSPHATE SERPL-MCNC: 3.5 MG/DL
PLATELET # BLD AUTO: 160 K/UL
PMV BLD AUTO: 10.3 FL
POCT GLUCOSE: 109 MG/DL (ref 70–110)
POCT GLUCOSE: 119 MG/DL (ref 70–110)
POTASSIUM SERPL-SCNC: 4.2 MMOL/L
PROTHROMBIN TIME: 25.1 SEC
RBC # BLD AUTO: 2.61 M/UL
SODIUM SERPL-SCNC: 134 MMOL/L
WBC # BLD AUTO: 8.45 K/UL

## 2017-02-15 PROCEDURE — 94640 AIRWAY INHALATION TREATMENT: CPT

## 2017-02-15 PROCEDURE — 20600001 HC STEP DOWN PRIVATE ROOM

## 2017-02-15 PROCEDURE — 25000003 PHARM REV CODE 250: Performed by: NURSE PRACTITIONER

## 2017-02-15 PROCEDURE — 83735 ASSAY OF MAGNESIUM: CPT

## 2017-02-15 PROCEDURE — 25000242 PHARM REV CODE 250 ALT 637 W/ HCPCS: Performed by: THORACIC SURGERY (CARDIOTHORACIC VASCULAR SURGERY)

## 2017-02-15 PROCEDURE — 25000003 PHARM REV CODE 250: Performed by: THORACIC SURGERY (CARDIOTHORACIC VASCULAR SURGERY)

## 2017-02-15 PROCEDURE — 63600175 PHARM REV CODE 636 W HCPCS: Performed by: STUDENT IN AN ORGANIZED HEALTH CARE EDUCATION/TRAINING PROGRAM

## 2017-02-15 PROCEDURE — 84100 ASSAY OF PHOSPHORUS: CPT

## 2017-02-15 PROCEDURE — 97116 GAIT TRAINING THERAPY: CPT

## 2017-02-15 PROCEDURE — 25000003 PHARM REV CODE 250: Performed by: STUDENT IN AN ORGANIZED HEALTH CARE EDUCATION/TRAINING PROGRAM

## 2017-02-15 PROCEDURE — 27000221 HC OXYGEN, UP TO 24 HOURS

## 2017-02-15 PROCEDURE — 85730 THROMBOPLASTIN TIME PARTIAL: CPT

## 2017-02-15 PROCEDURE — 63600175 PHARM REV CODE 636 W HCPCS: Performed by: HOSPITALIST

## 2017-02-15 PROCEDURE — 85025 COMPLETE CBC W/AUTO DIFF WBC: CPT

## 2017-02-15 PROCEDURE — 85610 PROTHROMBIN TIME: CPT

## 2017-02-15 PROCEDURE — 25000003 PHARM REV CODE 250: Performed by: HOSPITALIST

## 2017-02-15 PROCEDURE — 94664 DEMO&/EVAL PT USE INHALER: CPT

## 2017-02-15 PROCEDURE — 97530 THERAPEUTIC ACTIVITIES: CPT

## 2017-02-15 PROCEDURE — 80048 BASIC METABOLIC PNL TOTAL CA: CPT

## 2017-02-15 RX ORDER — IPRATROPIUM BROMIDE AND ALBUTEROL SULFATE 2.5; .5 MG/3ML; MG/3ML
3 SOLUTION RESPIRATORY (INHALATION) EVERY 4 HOURS PRN
Status: DISCONTINUED | OUTPATIENT
Start: 2017-02-15 | End: 2017-02-21

## 2017-02-15 RX ADMIN — OXYCODONE HYDROCHLORIDE 5 MG: 5 TABLET ORAL at 03:02

## 2017-02-15 RX ADMIN — OXYCODONE HYDROCHLORIDE AND ACETAMINOPHEN 500 MG: 500 TABLET ORAL at 09:02

## 2017-02-15 RX ADMIN — FUROSEMIDE 80 MG: 10 INJECTION, SOLUTION INTRAMUSCULAR; INTRAVENOUS at 08:02

## 2017-02-15 RX ADMIN — SILDENAFIL 20 MG: 20 TABLET ORAL at 05:02

## 2017-02-15 RX ADMIN — AMLODIPINE BESYLATE 10 MG: 10 TABLET ORAL at 08:02

## 2017-02-15 RX ADMIN — OXYCODONE HYDROCHLORIDE 10 MG: 5 TABLET ORAL at 09:02

## 2017-02-15 RX ADMIN — OXYCODONE HYDROCHLORIDE 10 MG: 5 TABLET ORAL at 06:02

## 2017-02-15 RX ADMIN — SILDENAFIL 20 MG: 20 TABLET ORAL at 01:02

## 2017-02-15 RX ADMIN — HYDRALAZINE HYDROCHLORIDE 100 MG: 50 TABLET ORAL at 01:02

## 2017-02-15 RX ADMIN — IPRATROPIUM BROMIDE AND ALBUTEROL SULFATE 3 ML: .5; 3 SOLUTION RESPIRATORY (INHALATION) at 01:02

## 2017-02-15 RX ADMIN — OXYCODONE HYDROCHLORIDE AND ACETAMINOPHEN 500 MG: 500 TABLET ORAL at 08:02

## 2017-02-15 RX ADMIN — LABETALOL HCL 300 MG: 300 TABLET, FILM COATED ORAL at 09:02

## 2017-02-15 RX ADMIN — POLYETHYLENE GLYCOL 3350 17 G: 17 POWDER, FOR SOLUTION ORAL at 08:02

## 2017-02-15 RX ADMIN — LABETALOL HCL 300 MG: 300 TABLET, FILM COATED ORAL at 01:02

## 2017-02-15 RX ADMIN — SODIUM FERRIC GLUCONATE COMPLEX 125 MG: 12.5 INJECTION INTRAVENOUS at 08:02

## 2017-02-15 RX ADMIN — HYDRALAZINE HYDROCHLORIDE 100 MG: 50 TABLET ORAL at 05:02

## 2017-02-15 RX ADMIN — ASPIRIN 81 MG: 81 TABLET, COATED ORAL at 08:02

## 2017-02-15 RX ADMIN — SILDENAFIL 20 MG: 20 TABLET ORAL at 09:02

## 2017-02-15 RX ADMIN — LABETALOL HCL 300 MG: 300 TABLET, FILM COATED ORAL at 05:02

## 2017-02-15 RX ADMIN — ERYTHROPOIETIN 10000 UNITS: 10000 INJECTION, SOLUTION INTRAVENOUS; SUBCUTANEOUS at 08:02

## 2017-02-15 RX ADMIN — HYDRALAZINE HYDROCHLORIDE 100 MG: 50 TABLET ORAL at 09:02

## 2017-02-15 RX ADMIN — OXYMETAZOLINE HYDROCHLORIDE 2 SPRAY: 5 SPRAY NASAL at 08:02

## 2017-02-15 RX ADMIN — CLONIDINE HYDROCHLORIDE 0.1 MG: 0.1 TABLET ORAL at 05:02

## 2017-02-15 RX ADMIN — IPRATROPIUM BROMIDE AND ALBUTEROL SULFATE 3 ML: .5; 3 SOLUTION RESPIRATORY (INHALATION) at 07:02

## 2017-02-15 RX ADMIN — OXYCODONE HYDROCHLORIDE 10 MG: 5 TABLET ORAL at 01:02

## 2017-02-15 RX ADMIN — IPRATROPIUM BROMIDE AND ALBUTEROL SULFATE 3 ML: .5; 3 SOLUTION RESPIRATORY (INHALATION) at 05:02

## 2017-02-15 RX ADMIN — CLONIDINE HYDROCHLORIDE 0.1 MG: 0.1 TABLET ORAL at 09:02

## 2017-02-15 RX ADMIN — FUROSEMIDE 80 MG: 10 INJECTION, SOLUTION INTRAMUSCULAR; INTRAVENOUS at 04:02

## 2017-02-15 RX ADMIN — CLONIDINE HYDROCHLORIDE 0.1 MG: 0.1 TABLET ORAL at 01:02

## 2017-02-15 NOTE — PLAN OF CARE
Problem: Patient Care Overview  Goal: Plan of Care Review  PHM: CHF, pulmonary HTN, CKD3, Hep B    PSH: ASD repair 7 y/o    Hospital Course:    2/8: Admit SICU Aortic valve replacement, Tricuspid valve repair, Redo sternotomy; 1L albumin, 2 amps bicarb, 2 unit PRBC  2/9: 2 PRBC, 1 amp bicarb, 1 500 5%Albumin, 1-100mL 25 %Albumin Levo/Milrinone off, wean Epi/Vaso, Heparin gtt started    Nursing:  * Accucheck Q6h  *Renal Function Panel q8h  *PTT q6h goal 60-80   Outcome: Ongoing (interventions implemented as appropriate)  Pt completed 3.5 hour hemodialysis treatment. Net UF 2.8 L. Pt tolerated well. Blood returned with normal saline to right IJ permcath, lumens filled with heparin, capped and taped. Pt transported via stretcher from dialysis to room 307A by transport.

## 2017-02-15 NOTE — PROGRESS NOTES
Progress Note  Cardiothoracic Surgery    Admit Date: 2/2/2017  Post-operative Day: 7 Days Post-Op  Hospital Day: 14    SUBJECTIVE: No acute events overnights. Pt NSR on monitor.      Follow-up For: Procedure(s) (LRB):  REPLACEMENT-VALVE-AORTIC (N/A)  REDO STERNOTOMY WITH AORTIC VALVE REPLACEMENT/redo sternotomy (N/A)  REPAIR-VALVE-TRICUSPID    Scheduled Meds:   amlodipine  10 mg Oral Daily    ascorbic acid (vitamin C)  500 mg Oral BID    aspirin  81 mg Oral Daily    cloNIDine  0.1 mg Oral TID    epoetin vikki (PROCRIT) injection  10,000 Units Intravenous Every Mon, Wed, Fri    ferric gluconate (FERRLECIT) IVPB  125 mg Intravenous Every Mon, Wed, Fri    furosemide  80 mg Intravenous BID    hydrALAZINE  100 mg Oral Q8H    labetalol  300 mg Oral Q8H    oxymetazoline  2 spray Each Nare BID    polyethylene glycol  17 g Per G Tube Daily    sildenafil  20 mg Oral TID    warfarin  2 mg Oral Daily     Infusions/Drips:   sodium chloride 0.45% 10 mL/hr (02/09/17 0800)     PRN Meds: sodium chloride, sodium chloride 0.9%, acetaminophen, albuterol-ipratropium 2.5mg-0.5mg/3mL, dextrose 50%, glucagon (human recombinant), heparin (porcine), hydrALAZINE, insulin aspart, labetalol, lactulose, metoclopramide HCl, ondansetron, oxycodone, oxycodone    Review of patient's allergies indicates:  No Known Allergies    OBJECTIVE:     Vital Signs (Most Recent)  Temp: 97.5 °F (36.4 °C) (02/15/17 0730)  Pulse: 95 (02/15/17 0900)  Resp: 16 (02/15/17 0730)  BP: 113/70 (02/15/17 0730)  SpO2: (!) 91 % (02/15/17 0730)    Admission Weight: 86.3 kg (190 lb 4.1 oz) (02/02/17 2035)   Most Recent Weight: 98.1 kg (216 lb 4.3 oz) (02/10/17 0556)    Vital Signs Range (Last 24H):  Temp:  [97.5 °F (36.4 °C)-98.8 °F (37.1 °C)]   Pulse:  []   Resp:  [16-20]   BP: ()/(56-79)   SpO2:  [91 %-99 %]     I & O (Last 24H):    Intake/Output Summary (Last 24 hours) at 02/15/17 1049  Last data filed at 02/15/17 0634   Gross per 24 hour   Intake               420 ml   Output             3325 ml   Net            -2905 ml     Physical Exam:  General: fatigued, no distress  Nose: no discharge, no crusting or bleeding points  Lungs:  clear to auscultation bilaterally and normal respiratory effort  Chest Wall: no tenderness  Heart: regular rate and rhythm, S1, S2 normal, no murmur, rub or gallop  Abdomen: soft non tender, + distention  Skin: Skin color, texture, turgor normal. No rashes or lesions  Neurologic: Alert and oriented. Thought content appropriate    Wound/Incision:  clean, dry, intact    Laboratory:  CBC:     Recent Labs  Lab 02/15/17  0337   WBC 8.45   RBC 2.61*   HGB 7.8*   HCT 23.8*      MCV 91   MCH 29.9   MCHC 32.8     BMP:     Recent Labs  Lab 02/15/17  0337   GLU 94   *   K 4.2      CO2 24   BUN 26*   CREATININE 3.2*   CALCIUM 8.4*   MG 1.9     Coagulation:     Recent Labs  Lab 02/15/17  0337 02/15/17  0904   INR  --  2.5*   APTT 35.0*  --        Diagnostic Results:  X-Ray: Reviewed    ASSESSMENT/PLAN:     Assessment:   Active Hospital Problems    Diagnosis    *S/P AVR (aortic valve replacement)    ESRD (end stage renal disease)    Hyperglycemia    Postprocedural hypotension    Nonrheumatic aortic valve stenosis    Adult congenital heart disease    Pulmonary HTN    CKD stage 5 secondary to hypertension    Uncontrolled hypertension    Hypertensive cardiomegaly with heart failure     Plan:   Sternal Precautions  PT/OT  Ambulate  S/p AVR: continue asa, lopressor and anticoagulation   ESRD: Pt tolerated HD and Nephro following. Continue lasix  Pulmonary HTN: sildenafil  HTN: continue norvasc, clonidine, hydralazine, labetalol and prns  Discharge planning ongoing-SNF/Rehab

## 2017-02-15 NOTE — PLAN OF CARE
VERONICA called [facility] [phone] and spoke with . VERONICA asked her it they are able to transport pt to dialysis and Md appts. She stated for pt to call them and ask to speak to SW and also he will have to fill out paper work.     VERONICA called [facility] [phone] and spoke with Boris. VERONICA asked if they are able to monitor pt's  PT/INR.She states they can. Will call back to make appt once speak to pt and family.

## 2017-02-15 NOTE — PLAN OF CARE
Problem: Patient Care Overview  Goal: Plan of Care Review  PHM: CHF, pulmonary HTN, CKD3, Hep B    PSH: ASD repair 9 y/o    Hospital Course:    2/8: Admit SICU Aortic valve replacement, Tricuspid valve repair, Redo sternotomy; 1L albumin, 2 amps bicarb, 2 unit PRBC  2/9: 2 PRBC, 1 amp bicarb, 1 500 5%Albumin, 1-100mL 25 %Albumin Levo/Milrinone off, wean Epi/Vaso, Heparin gtt started    Nursing:  * Accucheck Q6h  *Renal Function Panel q8h  *PTT q6h goal 60-80   Outcome: Ongoing (interventions implemented as appropriate)  POC reviewed with patient and sister with both verbalizing understanding. VSS and pain being managed with PRN medication. Blood sugar being monitored q6 with PRN glucose and insulin ordered. Chest tube and bhandari removed today. Patient to be up in chair for all meals and ambulate floor 3-4 times with staff. Patient in no apparent sign of distress, will continue to monitor.

## 2017-02-15 NOTE — PROGRESS NOTES
O2Sats low sitting up in chair; O2 increased to 3L via NC.  Pt denies any c/o SOB.  Deep breathing exercises and coughing encouraged.  Pt ambulated in hallways with 1-person assist.  Pt desaturated and required additional O2 while ambulating.  Able to transition back to 3L O2 once sitting back down. Will continue to monitor.        02/15/17 1616 02/15/17 1618 02/15/17 1620   Vital Signs   SpO2 (!) 86 % (!) 90 % (!) 85 %   Flow (L/min) 2 3 3   O2 Device (Oxygen Therapy) nasal cannula w/ humidification nasal cannula w/ humidification nasal cannula   Patient Position Sitting Sitting Standing  (Ambulating in hallway)       02/15/17 1623 02/15/17 1625 02/15/17 1628   Vital Signs   SpO2 (!) 89 % (!) 93 % (!) 90 %   Flow (L/min) 4 5 3   O2 Device (Oxygen Therapy) nasal cannula nasal cannula nasal cannula w/ humidification   Patient Position Standing  (Ambulating in hallway) Standing  (Ambulating in hallway) Sitting

## 2017-02-15 NOTE — PLAN OF CARE
Problem: Physical Therapy Goal  Goal: Physical Therapy Goal  Goals to be met by: 17    Patient will increase functional independence with mobility by performin. Supine to sit with Stand-by Assistance - not met  2. Sit to stand transfer with Supervision - not met  3. Gait x 220 feet with Supervision - not met    Outcome: Ongoing (interventions implemented as appropriate)  Goals reviewed and remain appropriate. Pt progressing towards goals.     Yenni De León, PT, DPT   2/15/2017  [phone]

## 2017-02-15 NOTE — PLAN OF CARE
Problem: Patient Care Overview  Goal: Plan of Care Review  PHM: CHF, pulmonary HTN, CKD3, Hep B    PSH: ASD repair 7 y/o    Hospital Course:    2/8: Admit SICU Aortic valve replacement, Tricuspid valve repair, Redo sternotomy; 1L albumin, 2 amps bicarb, 2 unit PRBC  2/9: 2 PRBC, 1 amp bicarb, 1 500 5%Albumin, 1-100mL 25 %Albumin Levo/Milrinone off, wean Epi/Vaso, Heparin gtt started    Nursing:  * Accucheck Q6h  *Renal Function Panel q8h  *PTT q6h goal 60-80   Outcome: Ongoing (interventions implemented as appropriate)  Plan of care discussed with patient, no new questions at this time. VSS, denies SOB, pain well managed. Medial chest tube still in place and secured. Sternal precautions discussed and pt verbalized understanding. Safety precautions taken, no falls/trauma during the shift. Will continue to monitor.

## 2017-02-15 NOTE — PT/OT/SLP PROGRESS
"Physical Therapy  Treatment    Isidro Lozano Jr [place]   MRN: [id number]   Admitting Diagnosis: S/P AVR (aortic valve replacement)    PT Received On: 02/15/17  PT Start Time: 1339     PT Stop Time: 1404    PT Total Time (min): 25 min       Billable Minutes:  Gait [id number] and Therapeutic Activity 10    Treatment Type: Treatment  PT/PTA: PT     PTA Visit Number: 0       General Precautions: Standard, fall, sternal  Orthopedic Precautions: N/A   Braces: N/A    Do you have any cultural, spiritual, Zoroastrian conflicts, given your current situation?:  (none)    Subjective:  Communicated with RN prior to session.  "My nose is bleeding." "I'm not concerned with those slippers. I'm worried about this nose."    Pain Ratin/10  Location - Side: Bilateral  Location - Orientation: generalized  Location: chest (and back)  Pain Addressed: Reposition, Distraction, Pre-medicate for activity, Nurse notified       Objective:   Patient found with: telemetry, oxygen    Functional Mobility:  Bed Mobility: not performed 2* pt UIC this session      Transfers:  Sit <> Stand Assistance: Minimum Assistance  Sit <> Stand Assistive Device: No Assistive Device    Gait:   Gait Distance: ~300 ft.   Assistance 1: Stand by Assistance  Gait Assistive Device: No device  Gait Pattern: 2-point gait  Gait Deviation(s): decreased lena, decreased step length, increased time in double stance, decreased toe-to-floor clearance, decreased velocity of limb motion, decreased weight-shifting ability  -Portable O2 in place throughout    Balance:   Static Sit: GOOD: Takes MODERATE challenges from all directions  Dynamic Sit: GOOD: Maintains balance through MODERATE excursions of active trunk movement  Static Stand: GOOD-: Takes MODERATE challenges from all directions inconsistently  Dynamic stand: FAIR+: Needs CLOSE SUPERVISION during gait and is able to right self with minor LOB     Therapeutic Activities and Exercises:  Reviewed sternal precautions. Pt required " min cueing to state all precautions. Able to maintain throughout mobility without cueing.   Pt educated on pursed lip breathing for improved breathing technique during ambulation.      AM-PAC 6 CLICK MOBILITY  How much help from another person does this patient currently need?   1 = Unable, Total/Dependent Assistance  2 = A lot, Maximum/Moderate Assistance  3 = A little, Minimum/Contact Guard/Supervision  4 = None, Modified [place]/Independent    Turning over in bed (including adjusting bedclothes, sheets and blankets)?: 3  Sitting down on and standing up from a chair with arms (e.g., wheelchair, bedside commode, etc.): 3  Moving from lying on back to sitting on the side of the bed?: 3  Moving to and from a bed to a chair (including a wheelchair)?: 3  Need to walk in hospital room?: 3  Climbing 3-5 steps with a railing?: 2  Total Score: 17    AM-PAC Raw Score CMS G-Code Modifier Level of Impairment Assistance   6 % Total / Unable   7 - 9 CM 80 - 100% Maximal Assist   10 - 14 CL 60 - 80% Moderate Assist   15 - 19 CK 40 - 60% Moderate Assist   20 - 22 CJ 20 - 40% Minimal Assist   23 CI 1-20% SBA / CGA   24 CH 0% Independent/ Mod I     Patient left up in chair with all lines intact, call button in reach and RN notified.    Assessment:  Isidro White is a 47 y.o. male with a medical diagnosis of S/P AVR (aortic valve replacement). Pt with minor nose bleed during session, which was of concern to pt; however, RN notified and no increase noted during session. Pt progressing ambulation distance but remains limited 2* pain, impaired endurance, and generalized weakness. Pt with good understanding of sternal precautions but needs [facility] to maintain during transfer. Pt would continue to benefit from skilled IP PT in order to address current deficits and progress functional mobility.     Rehab identified problem list/impairments: Rehab identified problem list/impairments: weakness, impaired endurance, impaired  functional mobilty, impaired balance, gait instability, impaired cardiopulmonary response to activity, decreased safety awareness, pain    Rehab potential is good.    Activity tolerance: Good    Discharge recommendations: Discharge Facility/Level Of Care Needs: home     Barriers to discharge: Barriers to Discharge: None    Equipment recommendations: Equipment Needed After Discharge: none     GOALS:   Physical Therapy Goals        Problem: Physical Therapy Goal    Goal Priority Disciplines Outcome Goal Variances Interventions   Physical Therapy Goal     PT/OT, PT Ongoing (interventions implemented as appropriate)     Description:  Goals to be met by: 17    Patient will increase functional independence with mobility by performin. Supine to sit with Stand-by Assistance - not met  2. Sit to stand transfer with Supervision - not met  3. Gait  x 220 feet with Supervision  - not met                 PLAN:    Patient to be seen 6 x/week  to address the above listed problems via gait training, therapeutic activities, therapeutic exercises  Plan of Care expires: 17  Plan of Care reviewed with: patient        Yennisiva De León, PT, DPT   2/15/2017  [phone]

## 2017-02-15 NOTE — PROGRESS NOTES
Progress Note  Nephrology    Admit Date: 2/2/2017   LOS: 12 days     SUBJECTIVE:     Follow-up For:  ESRD on HD     Interval follow up:  KD. Stepdown from ICU yesterday. Seen in VIV while on HD. Tolerating HD well without complications. Still making urine.  ml/24hrs despite RRT. Oxygenation improving down to NC 2 lts.     OBJECTIVE:     Vital Signs (Most Recent)  Temp: 98.5 °F (36.9 °C) (02/14/17 1430)  Pulse: 96 (02/14/17 1745)  Resp: 20 (02/14/17 1430)  BP: (!) 92/56 (02/14/17 1745)  SpO2: (!) 91 % (02/14/17 1430)    Vital Signs Range (Last 24H):  Temp:  [98.1 °F (36.7 °C)-99.6 °F (37.6 °C)]   Pulse:  []   Resp:  [16-30]   BP: ()/(56-80)   SpO2:  [89 %-100 %]       Intake/Output Summary (Last 24 hours) at 02/14/17 1801  Last data filed at 02/14/17 1300   Gross per 24 hour   Intake              240 ml   Output              180 ml   Net               60 ml     Physical Exam:  General: Well developed, well nourished  HEENT: Conjunctiva clear; EOMs clear   Neck: No JVD noted, Supple  Chest : chest tubes in place, surgical incision dressing c/d/i, CV- Normal S1, S2 with no murmurs,gallops,rubs  Resp- rales noted at bases on inspiration, no wheezing    Unlabored  Abdomen- NTND, BS heard, soft  Extrem- No cyanosis, clubbing, edema.  Skin- No rashes, lesions, ulcers  Neuro: AO x4, no FND   Access: [facility]     Laboratory:  CBC:     Recent Labs  Lab 02/14/17 0339   WBC 9.01   RBC 2.59*   HGB 7.8*   HCT 24.1*   *   MCV 93   MCH 30.1   MCHC 32.4     CMP:     Recent Labs  Lab 02/13/17  0615 02/14/17 0339   GLU 99 98   CALCIUM 8.4* 8.6*   ALBUMIN 2.3*  --    PROT 6.7  --     135*   K 4.5 4.8   CO2 25 23    103   BUN 6 23*   CREATININE 1.0 2.6*   ALKPHOS 59  --    ALT 5*  --    AST 39  --    BILITOT 1.4*  --      ABGs:     Recent Labs  Lab 02/10/17  1136   PH 7.345*   PCO2 35.1   HCO3 19.1*   POCSATURATED 95   BE -7       Diagnostic Results:  Labs: Reviewed  X-Ray:  Reviewed    ASSESSMENT/PLAN:     47 y.o. AAM with h/o ASD repair at 9 yo, aortic stenosis with CHF wirh rEF, pulmonary HTN, active hep B transferred from [facility] for surgery eval for AVR.     Now s/p redo sternotomy with  AVR and Tricuspid valve repair (2/8/2017)     CKD 5 progressed to ESRD on IHD MWF     - improving O2 now down to NC 2 lts  - Seen while in HD tolerated well no complains  - HD today for metabolic clearance and volume management x 3.5 hrs  - Target UF 2-3 lts  - Lytes and acid base stable   - Continue lasix 80 mg BID as he is making Urine  - Monitor strict I/Os, daily weights    Aceess:RIJ-Perm cath    Active problem in hospital  - s/p AVR    Anemia of Chronic Kidney Disease   - Will resume MARYBETH EPO [id number] uts with HD  - Fe depleted continue Ferlicit with HD    Mineral Bone Disease in CKD   - Already on renal diet   - Phos at goal no need binders at this time  - RFP daily to monitor Phos    HTN   - Well control BP, will continue to monitor. Goal for BP is <130 mmHg SBP and BDP <80 mmHg.   - continue current treatment.  Case discuss with Staff further recs with attestation.    Abhjiit Shah MD  Nephrology Fellow PGY4  [phone]

## 2017-02-16 LAB
ANION GAP SERPL CALC-SCNC: 10 MMOL/L
BASOPHILS # BLD AUTO: 0.01 K/UL
BASOPHILS NFR BLD: 0.1 %
BUN SERPL-MCNC: 46 MG/DL
CALCIUM SERPL-MCNC: 8.4 MG/DL
CHLORIDE SERPL-SCNC: 101 MMOL/L
CO2 SERPL-SCNC: 23 MMOL/L
CREAT SERPL-MCNC: 5.4 MG/DL
DIFFERENTIAL METHOD: ABNORMAL
EOSINOPHIL # BLD AUTO: 0.3 K/UL
EOSINOPHIL NFR BLD: 3.5 %
ERYTHROCYTE [DISTWIDTH] IN BLOOD BY AUTOMATED COUNT: 16.1 %
EST. GFR  (AFRICAN AMERICAN): 13.5 ML/MIN/1.73 M^2
EST. GFR  (NON AFRICAN AMERICAN): 11.7 ML/MIN/1.73 M^2
GLUCOSE SERPL-MCNC: 88 MG/DL
HCT VFR BLD AUTO: 24.3 %
HGB BLD-MCNC: 7.9 G/DL
INR PPP: 2.4
LYMPHOCYTES # BLD AUTO: 1.8 K/UL
LYMPHOCYTES NFR BLD: 19.4 %
MAGNESIUM SERPL-MCNC: 2.1 MG/DL
MCH RBC QN AUTO: 30.3 PG
MCHC RBC AUTO-ENTMCNC: 32.5 %
MCV RBC AUTO: 93 FL
MONOCYTES # BLD AUTO: 0.9 K/UL
MONOCYTES NFR BLD: 10.3 %
NEUTROPHILS # BLD AUTO: 6 K/UL
NEUTROPHILS NFR BLD: 66.4 %
PHOSPHATE SERPL-MCNC: 5.4 MG/DL
PLATELET # BLD AUTO: 175 K/UL
PMV BLD AUTO: 11.1 FL
POCT GLUCOSE: 100 MG/DL (ref 70–110)
POCT GLUCOSE: 105 MG/DL (ref 70–110)
POTASSIUM SERPL-SCNC: 4.7 MMOL/L
PROTHROMBIN TIME: 23.6 SEC
RBC # BLD AUTO: 2.61 M/UL
SODIUM SERPL-SCNC: 134 MMOL/L
TB INDURATION 48 - 72 HR READ: 0 MM
WBC # BLD AUTO: 9.09 K/UL

## 2017-02-16 PROCEDURE — 83735 ASSAY OF MAGNESIUM: CPT

## 2017-02-16 PROCEDURE — 27000207 HC ISOLATION

## 2017-02-16 PROCEDURE — 20600001 HC STEP DOWN PRIVATE ROOM

## 2017-02-16 PROCEDURE — 25000003 PHARM REV CODE 250: Performed by: STUDENT IN AN ORGANIZED HEALTH CARE EDUCATION/TRAINING PROGRAM

## 2017-02-16 PROCEDURE — 36415 COLL VENOUS BLD VENIPUNCTURE: CPT

## 2017-02-16 PROCEDURE — 97530 THERAPEUTIC ACTIVITIES: CPT

## 2017-02-16 PROCEDURE — 25000003 PHARM REV CODE 250: Performed by: HOSPITALIST

## 2017-02-16 PROCEDURE — 25000003 PHARM REV CODE 250: Performed by: NURSE PRACTITIONER

## 2017-02-16 PROCEDURE — 90935 HEMODIALYSIS ONE EVALUATION: CPT

## 2017-02-16 PROCEDURE — 85025 COMPLETE CBC W/AUTO DIFF WBC: CPT

## 2017-02-16 PROCEDURE — 63600175 PHARM REV CODE 636 W HCPCS: Performed by: HOSPITALIST

## 2017-02-16 PROCEDURE — 25000003 PHARM REV CODE 250: Performed by: THORACIC SURGERY (CARDIOTHORACIC VASCULAR SURGERY)

## 2017-02-16 PROCEDURE — 97116 GAIT TRAINING THERAPY: CPT

## 2017-02-16 PROCEDURE — 63600175 PHARM REV CODE 636 W HCPCS: Performed by: STUDENT IN AN ORGANIZED HEALTH CARE EDUCATION/TRAINING PROGRAM

## 2017-02-16 PROCEDURE — 25000003 PHARM REV CODE 250: Performed by: INTERNAL MEDICINE

## 2017-02-16 PROCEDURE — 96372 THER/PROPH/DIAG INJ SC/IM: CPT

## 2017-02-16 PROCEDURE — 80048 BASIC METABOLIC PNL TOTAL CA: CPT

## 2017-02-16 PROCEDURE — 90935 HEMODIALYSIS ONE EVALUATION: CPT | Mod: ,,, | Performed by: INTERNAL MEDICINE

## 2017-02-16 PROCEDURE — 84100 ASSAY OF PHOSPHORUS: CPT

## 2017-02-16 PROCEDURE — 85610 PROTHROMBIN TIME: CPT

## 2017-02-16 PROCEDURE — 80100016 HC MAINTENANCE HEMODIALYSIS

## 2017-02-16 PROCEDURE — 80100014 HC HEMODIALYSIS 1:1

## 2017-02-16 RX ORDER — SODIUM CHLORIDE 9 MG/ML
INJECTION, SOLUTION INTRAVENOUS ONCE
Status: COMPLETED | OUTPATIENT
Start: 2017-02-16 | End: 2017-02-16

## 2017-02-16 RX ORDER — SODIUM CHLORIDE 9 MG/ML
INJECTION, SOLUTION INTRAVENOUS
Status: DISCONTINUED | OUTPATIENT
Start: 2017-02-16 | End: 2017-02-17

## 2017-02-16 RX ADMIN — SILDENAFIL 20 MG: 20 TABLET ORAL at 09:02

## 2017-02-16 RX ADMIN — ERYTHROPOIETIN 10000 UNITS: 10000 INJECTION, SOLUTION INTRAVENOUS; SUBCUTANEOUS at 12:02

## 2017-02-16 RX ADMIN — HYDRALAZINE HYDROCHLORIDE 100 MG: 50 TABLET ORAL at 09:02

## 2017-02-16 RX ADMIN — CLONIDINE HYDROCHLORIDE 0.1 MG: 0.1 TABLET ORAL at 09:02

## 2017-02-16 RX ADMIN — HEPARIN SODIUM 1000 UNITS: 1000 INJECTION, SOLUTION INTRAVENOUS; SUBCUTANEOUS at 12:02

## 2017-02-16 RX ADMIN — CLONIDINE HYDROCHLORIDE 0.1 MG: 0.1 TABLET ORAL at 05:02

## 2017-02-16 RX ADMIN — FUROSEMIDE 80 MG: 10 INJECTION, SOLUTION INTRAMUSCULAR; INTRAVENOUS at 05:02

## 2017-02-16 RX ADMIN — SODIUM CHLORIDE: 0.9 INJECTION, SOLUTION INTRAVENOUS at 09:02

## 2017-02-16 RX ADMIN — LABETALOL HCL 300 MG: 300 TABLET, FILM COATED ORAL at 09:02

## 2017-02-16 RX ADMIN — HYDRALAZINE HYDROCHLORIDE 100 MG: 50 TABLET ORAL at 05:02

## 2017-02-16 RX ADMIN — SILDENAFIL 20 MG: 20 TABLET ORAL at 01:02

## 2017-02-16 RX ADMIN — OXYCODONE HYDROCHLORIDE 10 MG: 5 TABLET ORAL at 05:02

## 2017-02-16 RX ADMIN — SODIUM FERRIC GLUCONATE COMPLEX 125 MG: 12.5 INJECTION INTRAVENOUS at 10:02

## 2017-02-16 RX ADMIN — OXYCODONE HYDROCHLORIDE 10 MG: 5 TABLET ORAL at 02:02

## 2017-02-16 RX ADMIN — SILDENAFIL 20 MG: 20 TABLET ORAL at 05:02

## 2017-02-16 RX ADMIN — ASPIRIN 81 MG: 81 TABLET, COATED ORAL at 01:02

## 2017-02-16 RX ADMIN — LABETALOL HCL 300 MG: 300 TABLET, FILM COATED ORAL at 05:02

## 2017-02-16 RX ADMIN — POLYETHYLENE GLYCOL 3350 17 G: 17 POWDER, FOR SOLUTION ORAL at 01:02

## 2017-02-16 RX ADMIN — OXYCODONE HYDROCHLORIDE AND ACETAMINOPHEN 500 MG: 500 TABLET ORAL at 09:02

## 2017-02-16 RX ADMIN — AMLODIPINE BESYLATE 10 MG: 10 TABLET ORAL at 01:02

## 2017-02-16 RX ADMIN — OXYCODONE HYDROCHLORIDE 10 MG: 5 TABLET ORAL at 09:02

## 2017-02-16 RX ADMIN — OXYCODONE HYDROCHLORIDE AND ACETAMINOPHEN 500 MG: 500 TABLET ORAL at 01:02

## 2017-02-16 RX ADMIN — FUROSEMIDE 80 MG: 10 INJECTION, SOLUTION INTRAMUSCULAR; INTRAVENOUS at 01:02

## 2017-02-16 NOTE — PLAN OF CARE
LEI and the CM met with the pt's sister while the pt was in HD.  LEI asked if they would prefer [facility] or [facility] as boht now can accept him.  She stated that they would prefer [facility].  LEI spoke with the PA and the pt will likely dc on Tuesday.  LEI spoke with [facility] intake to confirm that the pt has a chair.  They do have a chair for him starting  Tomorrow.  LEI informed them that the pt will dc Tuesday and can start HD next Wednesday (MWF 8:15am).  She stated that they will inform the HD unit.  LEI informed [facility] that the pt will go to [facility].    Yue Garibay, [facility] x [phone]

## 2017-02-16 NOTE — NURSING TRANSFER
Nursing Transfer Note      2/16/2017     Transfer To: dialysis    Transfer via stretcher    Transfer with 3L O2, cardiac monitoring, breakfast tray    Transported by escort    Medicines sent: none    Chart send with patient: No    Will monitor for pt return.

## 2017-02-16 NOTE — PLAN OF CARE
Problem: Physical Therapy Goal  Goal: Physical Therapy Goal  Goals to be met by: 17    Patient will increase functional independence with mobility by performin. Supine to sit with Stand-by Assistance - not met  2. Sit to stand transfer with Supervision - not met  3. Gait x 220 feet with Supervision - not met      Pt continues to progress towards goals.  KORIN Roy

## 2017-02-16 NOTE — PT/OT/SLP PROGRESS
"Physical Therapy  Treatment    Isidro White   MRN: [id number]   Admitting Diagnosis: S/P AVR (aortic valve replacement)       PT Start Time: 1321          PT Total Time (min): 23 min       Billable Minutes:  Gait Training9 and Therapeutic Activity 14             PTA Visit Number: 1       General Precautions: Standard, sternal, fall  Orthopedic Precautions: N/A   Braces:      Do you have any cultural, spiritual, Judaism conflicts, given your current situation?:  (none)    Subjective:  Communicated with RN prior to session.  "I want to brush my teeth and wash my face"     Pain rating 7/10 in sternal incision.    Objective:        Functional Mobility:  Bed Mobility: Pt [facility] upon arrival       Transfers:  Sit <> Stand Assistance: Minimum Assistance (x2 attempts)  Sit <> Stand Assistive Device: No Assistive Device    Gait:   Gait Distance: 200 ft  Assistance 1: Contact Guard Assistance  Gait Assistive Device: No device  Gait Pattern: 2-point gait  Gait Deviation(s): decreased stride length, decreased step length, decreased velocity of limb motion, decreased lena    Balance:   Static Sit: FAIR-: Maintains without assist but inconsistent   Dynamic Sit: FAIR+: Maintains balance through MINIMAL excursions of active trunk motion  Static Stand: POOR+: Needs MINIMAL assist to maintain  Dynamic stand: FAIR: Needs CONTACT GUARD during gait     Therapeutic Activities and Exercises:  Pt washed his face and brushed his teeth  Sternal precautions were reviewed with pt and reinforced during functional mobility.       AM-PAC 6 CLICK MOBILITY  How much help from another person does this patient currently need?   1 = Unable, Total/Dependent Assistance  2 = A lot, Maximum/Moderate Assistance  3 = A little, Minimum/Contact Guard/Supervision  4 = None, Modified [place]/Independent         AM-PAC Raw Score CMS G-Code Modifier Level of Impairment Assistance   6 % Total / Unable   7 - 9 CM 80 - 100% Maximal Assist   10 - " 14 CL 60 - 80% Moderate Assist   15 - 19 CK 40 - 60% Moderate Assist   20 - 22 CJ 20 - 40% Minimal Assist   23 CI 1-20% SBA / CGA   24 CH 0% Independent/ Mod I     Patient left up in chair with call button in reach.    Assessment:  Isidro White is a 47 y.o. male with a medical diagnosis of S/P AVR (aortic valve replacement) and presents with problems listed below. Pt continues to progress and agreeable to tx. Will continue to benefit from skilled PT for gait training, and functional mobility.    Rehab potential is good.    Activity tolerance: Good    Discharge recommendations:   home with no needs    Barriers to discharge:  none    Equipment recommendations:   none    GOALS:   Physical Therapy Goals        Problem: Physical Therapy Goal    Goal Priority Disciplines Outcome Goal Variances Interventions   Physical Therapy Goal     PT/OT, PT Ongoing (interventions implemented as appropriate)     Description:  Goals to be met by: 17    Patient will increase functional independence with mobility by performin. Supine to sit with Stand-by Assistance - not met  2. Sit to stand transfer with Supervision - not met  3. Gait  x 220 feet with Supervision  - not met                 PLAN:    Patient to be seen 6 x/week  to address the above listed problems via gait training, therapeutic activities, therapeutic exercises  Plan of Care expires: 17  Plan of Care reviewed with: patient     KORIN Roy    I certify that I was present in the room directing the student in service delivery and guiding them using my skilled judgment. As the co-signing therapist I have reviewed the students documentation and am responsible for the treatment, assessment, and plan.         Rachelle Castaneda, PTA  2017

## 2017-02-16 NOTE — PROGRESS NOTES
Progress Note  Cardiothoracic Surgery    Admit Date: 2/2/2017  Post-operative Day: 8 Days Post-Op  Hospital Day: 15    SUBJECTIVE: No acute events overnights. Pt NSR on monitor.      Follow-up For: Procedure(s) (LRB):  REPLACEMENT-VALVE-AORTIC (N/A)  REDO STERNOTOMY WITH AORTIC VALVE REPLACEMENT/redo sternotomy (N/A)  REPAIR-VALVE-TRICUSPID    Scheduled Meds:   sodium chloride 0.9%   Intravenous Once    amlodipine  10 mg Oral Daily    ascorbic acid (vitamin C)  500 mg Oral BID    aspirin  81 mg Oral Daily    cloNIDine  0.1 mg Oral TID    epoetin vikki (PROCRIT) injection  10,000 Units Intravenous Every Mon, Wed, Fri    ferric gluconate (FERRLECIT) IVPB  125 mg Intravenous Every Mon, Wed, Fri    furosemide  80 mg Intravenous BID    hydrALAZINE  100 mg Oral Q8H    labetalol  300 mg Oral Q8H    oxymetazoline  2 spray Each Nare BID    polyethylene glycol  17 g Per G Tube Daily    sildenafil  20 mg Oral TID     Infusions/Drips:   sodium chloride 0.45% 10 mL/hr (02/09/17 0800)     PRN Meds: sodium chloride, sodium chloride 0.9%, acetaminophen, albuterol-ipratropium 2.5mg-0.5mg/3mL, dextrose 50%, glucagon (human recombinant), heparin (porcine), hydrALAZINE, insulin aspart, labetalol, lactulose, metoclopramide HCl, ondansetron, oxycodone, oxycodone    Review of patient's allergies indicates:  No Known Allergies    OBJECTIVE:     Vital Signs (Most Recent)  Temp: 98.1 °F (36.7 °C) (02/16/17 0836)  Pulse: (!) 120 (02/16/17 0836)  Resp: 20 (02/16/17 0836)  BP: (!) 145/90 (pt leaving for dialysis) (02/16/17 0836)  SpO2: (!) 94 % (02/16/17 0836)    Admission Weight: 86.3 kg (190 lb 4.1 oz) (02/02/17 2035)   Most Recent Weight:  (patient refused nurse was told) (02/16/17 0500)    Vital Signs Range (Last 24H):  Temp:  [97.8 °F (36.6 °C)-98.4 °F (36.9 °C)]   Pulse:  []   Resp:  [18-20]   BP: ()/(59-90)   SpO2:  [85 %-95 %]     I & O (Last 24H):    Intake/Output Summary (Last 24 hours) at 02/16/17 9945  Last  data filed at 02/16/17 0500   Gross per 24 hour   Intake              120 ml   Output                0 ml   Net              120 ml     Physical Exam:  General: fatigued, no distress  Nose: no discharge, no crusting or bleeding points  Lungs:  clear to auscultation bilaterally and normal respiratory effort  Chest Wall: no tenderness  Heart: regular rate and rhythm, S1, S2 normal, no murmur, rub or gallop  Abdomen: soft non tender, + distention  Skin: Skin color, texture, turgor normal. No rashes or lesions  Neurologic: Alert and oriented. Thought content appropriate    Wound/Incision:  clean, dry, intact    Laboratory:  CBC:     Recent Labs  Lab 02/16/17 0342   WBC 9.09   RBC 2.61*   HGB 7.9*   HCT 24.3*      MCV 93   MCH 30.3   MCHC 32.5     BMP:     Recent Labs  Lab 02/16/17 0342   GLU 88   *   K 4.7      CO2 23   BUN 46*   CREATININE 5.4*   CALCIUM 8.4*   MG 2.1     Coagulation:     Recent Labs  Lab 02/15/17  0337  02/16/17 0342   INR  --   < > 2.4*   APTT 35.0*  --   --    < > = values in this interval not displayed.    Diagnostic Results:  X-Ray: Reviewed    ASSESSMENT/PLAN:     Assessment:   Active Hospital Problems    Diagnosis    *S/P AVR (aortic valve replacement)    ESRD (end stage renal disease)    Hyperglycemia    Postprocedural hypotension    Nonrheumatic aortic valve stenosis    Adult congenital heart disease    Pulmonary HTN    CKD stage 5 secondary to hypertension    Uncontrolled hypertension    Hypertensive cardiomegaly with heart failure     Plan:   Sternal Precautions  PT/OT  Ambulate  S/p AVR: continue asa, lopressor and holding anticoagulation   ESRD: Pt tolerated HD and Nephro following. Continue lasix  Pulmonary HTN: sildenafil  HTN: continue norvasc, clonidine, hydralazine, labetalol and prns  Discharge planning ongoing-SNF/Rehab

## 2017-02-16 NOTE — NURSING TRANSFER
Nursing Transfer Note      2/16/2017     Transfer From: dialysis    Transfer via stretcher    Transfer with O2, cardiac monitoring    Transported by escort    Medicines sent: none    Chart send with patient: No    Notified: n/a    Patient reassessed at: 1320, 2/16 (date, time)    Upon arrival to floor: call bell in reach and bed in lowest position

## 2017-02-16 NOTE — PROGRESS NOTES
Progress Note  Nephrology    Admit Date: 2/2/2017   LOS: 14 days     SUBJECTIVE:     Follow-up For:  ESRD on HD     Interval follow up:  NAEON. Had sinus tach during final hour of HD UF had been ~ 900 cc. Resolved with 250 ml bolus back to baseline HR. New EDW 78 kg. Seen in VIV while on HD. Tolerated the rest of his treatment well without complications. UO 0 ml/24hrs. Oxygenation improving down to NC 3 lts.     OBJECTIVE:     Vital Signs (Most Recent)  Temp: 97.8 °F (36.6 °C) (02/16/17 0900)  Pulse: 93 (02/16/17 1215)  Resp: (!) 22 (02/16/17 0900)  BP: 114/67 (02/16/17 1215)  SpO2: (!) 94 % (02/16/17 0836)    Vital Signs Range (Last 24H):  Temp:  [97.8 °F (36.6 °C)-98.4 °F (36.9 °C)]   Pulse:  []   Resp:  [18-22]   BP: ()/(56-90)   SpO2:  [85 %-95 %]       Intake/Output Summary (Last 24 hours) at 02/16/17 1219  Last data filed at 02/16/17 0500   Gross per 24 hour   Intake              120 ml   Output                0 ml   Net              120 ml     Physical Exam:  General: Well developed, well nourished NAD  HEENT: Conjunctiva clear; EOMs clear   Neck: No JVD noted, Supple  Chest : chest tubes in place, surgical incision dressing c/d/i,   CV- Normal S1, S2 with no murmurs,gallops,rubs  Resp- rales noted at bases improved, no wheezing    Unlabored  Abdomen- NTND, BS heard, soft  Extrem- No cyanosis, clubbing, edema.  Skin- No rashes, lesions, ulcers  Neuro: AO x4, no FND   Access: [facility]     Laboratory:  CBC:     Recent Labs  Lab 02/16/17  0342   WBC 9.09   RBC 2.61*   HGB 7.9*   HCT 24.3*      MCV 93   MCH 30.3   MCHC 32.5     CMP:     Recent Labs  Lab 02/13/17  0615  02/16/17  0342   GLU 99  < > 88   CALCIUM 8.4*  < > 8.4*   ALBUMIN 2.3*  --   --    PROT 6.7  --   --      < > 134*   K 4.5  < > 4.7   CO2 25  < > 23     < > 101   BUN 6  < > 46*   CREATININE 1.0  < > 5.4*   ALKPHOS 59  --   --    ALT 5*  --   --    AST 39  --   --    BILITOT 1.4*  --   --    < > = values in this  interval not displayed.  ABGs:     Recent Labs  Lab 02/10/17  1136   PH 7.345*   PCO2 35.1   HCO3 19.1*   POCSATURATED 95   BE -7       Diagnostic Results:  Labs: Reviewed  X-Ray: Reviewed    ASSESSMENT/PLAN:     47 y.o. AAM with h/o ASD repair at 7 yo, aortic stenosis with CHF wirh rEF, pulmonary HTN, active hep B transferred from [facility] for surgery eval for AVR.     Now s/p redo sternotomy with  AVR and Tricuspid valve repair (2/8/2017)     CKD 5 progressed to ESRD on IHD MWF     - improving O2 now down to NC 3 lts  - Seen while in HD tolerated well no complains  - HD today for metabolic clearance and volume management x 3.5 hrs  - Target UF 1-2 lts  - Lytes and acid base stable   - Continue lasix 80 mg BID as he is making Urine  - Monitor strict I/Os, daily weights  - EDW 78 kg    Aceess:RIJ-Perm cath    Active problem in hospital  - s/p AVR    Anemia of Chronic Kidney Disease   - Will Cont MARYBETH EPO [id number] uts with HD  - Fe depleted continue Ferlicit with HD    Mineral Bone Disease in CKD   - Cont on renal diet   - Phos at goal but increasing will need binders at this time start Renvela 800 mg AC and snacks  - RFP daily to monitor Phos    HTN   - Well control BP, will continue to monitor. Goal for BP is <130 mmHg SBP and BDP <80 mmHg.   - continue current treatment.  Case discuss with Staff further recs with attestation.    Abhijit Shah MD  Nephrology Fellow PGY4  [phone]

## 2017-02-16 NOTE — PLAN OF CARE
Problem: Patient Care Overview  Goal: Plan of Care Review  PHM: CHF, pulmonary HTN, CKD3, Hep B    PSH: ASD repair 9 y/o    Hospital Course:    2/8: Admit SICU Aortic valve replacement, Tricuspid valve repair, Redo sternotomy; 1L albumin, 2 amps bicarb, 2 unit PRBC  2/9: 2 PRBC, 1 amp bicarb, 1 500 5%Albumin, 1-100mL 25 %Albumin Levo/Milrinone off, wean Epi/Vaso, Heparin gtt started    Nursing:  * Accucheck Q6h  *Renal Function Panel q8h  *PTT q6h goal 60-80   Outcome: Ongoing (interventions implemented as appropriate)  3.5 hour dialysis complete.  Blood rinsed back.  RIJ permcath flushed with normal saline.  Both lumens filled with heparin, capped and taped.  Net UF 0.5L.  HR suddenly increased from 90's to 130's sinus tach during tx.  BP remained stable.  UF turned off.  Normal saline bolus of 200 ml given.  HR returned to 90's within 2 minutes.  UF remained off for duration of tx.  Dr. Shah was at the bedside.  No further episodes occurred during tx. Procrit and ferric gluconate given as ordered.

## 2017-02-16 NOTE — PLAN OF CARE
Pt off floor to dialysis. VERONICA and Fazal spoke with pt's sister concerning dc plan. VERONICA spoke with her and informed Dr. Elmore/Veronica will not be able to follow him due to no insurance. ([place] med[place]; covers hospital stay only) VERONICA informed her CM found [facility] [address] [place] [address]. ([phone]) CM will informed her plan to dc next week Tuesday. VERONICA will make follow up appt. VERONICA also gave her information on [facility] [phone] for her to call and ask to speak to SW to fill out paper work for transportation to diaysis and MD visits. She is agreeable with plan.

## 2017-02-16 NOTE — PLAN OF CARE
Problem: Patient Care Overview  Goal: Plan of Care Review  PHM: CHF, pulmonary HTN, CKD3, Hep B    PSH: ASD repair 7 y/o    Hospital Course:    2/8: Admit SICU Aortic valve replacement, Tricuspid valve repair, Redo sternotomy; 1L albumin, 2 amps bicarb, 2 unit PRBC  2/9: 2 PRBC, 1 amp bicarb, 1 500 5%Albumin, 1-100mL 25 %Albumin Levo/Milrinone off, wean Epi/Vaso, Heparin gtt started    Nursing:  * Accucheck Q6h  *Renal Function Panel q8h  *PTT q6h goal 60-80   Outcome: Ongoing (interventions implemented as appropriate)  Patient verbalizes no complaints overnight. Denies chest pain, SOB, or other pain discomfort. Patient O2 sat high 90's on 3L O2 via NC with humidification. CBG  Being monitored Q6H per order. Patient remains free of falls or injury. No significant events. Awaiting d/c to SNIF or rehab. Patient verbalizes complete understanding of plan of care. Will continue to monitor.

## 2017-02-17 LAB
ANION GAP SERPL CALC-SCNC: 9 MMOL/L
BASOPHILS # BLD AUTO: 0.03 K/UL
BASOPHILS NFR BLD: 0.3 %
BUN SERPL-MCNC: 37 MG/DL
CALCIUM SERPL-MCNC: 8.4 MG/DL
CHLORIDE SERPL-SCNC: 101 MMOL/L
CO2 SERPL-SCNC: 22 MMOL/L
CREAT SERPL-MCNC: 5 MG/DL
DIFFERENTIAL METHOD: ABNORMAL
EOSINOPHIL # BLD AUTO: 0.4 K/UL
EOSINOPHIL NFR BLD: 4 %
ERYTHROCYTE [DISTWIDTH] IN BLOOD BY AUTOMATED COUNT: 16.3 %
EST. GFR  (AFRICAN AMERICAN): 14.8 ML/MIN/1.73 M^2
EST. GFR  (NON AFRICAN AMERICAN): 12.8 ML/MIN/1.73 M^2
GLUCOSE SERPL-MCNC: 93 MG/DL
HCT VFR BLD AUTO: 23.4 %
HGB BLD-MCNC: 7.5 G/DL
INR PPP: 1.9
LYMPHOCYTES # BLD AUTO: 1.3 K/UL
LYMPHOCYTES NFR BLD: 12.5 %
MAGNESIUM SERPL-MCNC: 2.1 MG/DL
MCH RBC QN AUTO: 29.9 PG
MCHC RBC AUTO-ENTMCNC: 32.1 %
MCV RBC AUTO: 93 FL
MONOCYTES # BLD AUTO: 1.4 K/UL
MONOCYTES NFR BLD: 12.8 %
NEUTROPHILS # BLD AUTO: 7.4 K/UL
NEUTROPHILS NFR BLD: 69.7 %
PHOSPHATE SERPL-MCNC: 5.1 MG/DL
PLATELET # BLD AUTO: 251 K/UL
PMV BLD AUTO: 10.2 FL
POCT GLUCOSE: 120 MG/DL (ref 70–110)
POTASSIUM SERPL-SCNC: 4.4 MMOL/L
PROTHROMBIN TIME: 18.9 SEC
RBC # BLD AUTO: 2.51 M/UL
SODIUM SERPL-SCNC: 132 MMOL/L
WBC # BLD AUTO: 10.57 K/UL

## 2017-02-17 PROCEDURE — 85025 COMPLETE CBC W/AUTO DIFF WBC: CPT

## 2017-02-17 PROCEDURE — 80048 BASIC METABOLIC PNL TOTAL CA: CPT

## 2017-02-17 PROCEDURE — 97530 THERAPEUTIC ACTIVITIES: CPT

## 2017-02-17 PROCEDURE — 25000003 PHARM REV CODE 250: Performed by: STUDENT IN AN ORGANIZED HEALTH CARE EDUCATION/TRAINING PROGRAM

## 2017-02-17 PROCEDURE — 97116 GAIT TRAINING THERAPY: CPT

## 2017-02-17 PROCEDURE — 25000003 PHARM REV CODE 250: Performed by: NURSE PRACTITIONER

## 2017-02-17 PROCEDURE — 83735 ASSAY OF MAGNESIUM: CPT

## 2017-02-17 PROCEDURE — 20600001 HC STEP DOWN PRIVATE ROOM

## 2017-02-17 PROCEDURE — 63600175 PHARM REV CODE 636 W HCPCS: Performed by: STUDENT IN AN ORGANIZED HEALTH CARE EDUCATION/TRAINING PROGRAM

## 2017-02-17 PROCEDURE — 85610 PROTHROMBIN TIME: CPT

## 2017-02-17 PROCEDURE — 25000003 PHARM REV CODE 250: Performed by: THORACIC SURGERY (CARDIOTHORACIC VASCULAR SURGERY)

## 2017-02-17 PROCEDURE — 84100 ASSAY OF PHOSPHORUS: CPT

## 2017-02-17 PROCEDURE — 36415 COLL VENOUS BLD VENIPUNCTURE: CPT

## 2017-02-17 RX ORDER — SODIUM CHLORIDE 9 MG/ML
INJECTION, SOLUTION INTRAVENOUS
Status: DISCONTINUED | OUTPATIENT
Start: 2017-02-17 | End: 2017-02-21

## 2017-02-17 RX ORDER — DOCUSATE SODIUM 100 MG/1
100 CAPSULE, LIQUID FILLED ORAL 2 TIMES DAILY
Status: DISCONTINUED | OUTPATIENT
Start: 2017-02-17 | End: 2017-02-21 | Stop reason: HOSPADM

## 2017-02-17 RX ORDER — BISACODYL 10 MG
10 SUPPOSITORY, RECTAL RECTAL DAILY PRN
Status: DISCONTINUED | OUTPATIENT
Start: 2017-02-17 | End: 2017-02-21

## 2017-02-17 RX ORDER — SODIUM CHLORIDE 9 MG/ML
INJECTION, SOLUTION INTRAVENOUS ONCE
Status: DISCONTINUED | OUTPATIENT
Start: 2017-02-17 | End: 2017-02-20

## 2017-02-17 RX ORDER — WARFARIN 2 MG/1
2 TABLET ORAL DAILY
Status: DISCONTINUED | OUTPATIENT
Start: 2017-02-17 | End: 2017-02-18

## 2017-02-17 RX ORDER — POLYETHYLENE GLYCOL 3350 17 G/17G
17 POWDER, FOR SOLUTION ORAL 2 TIMES DAILY
Status: DISCONTINUED | OUTPATIENT
Start: 2017-02-17 | End: 2017-02-21

## 2017-02-17 RX ORDER — AMLODIPINE BESYLATE 5 MG/1
5 TABLET ORAL DAILY
Status: DISCONTINUED | OUTPATIENT
Start: 2017-02-17 | End: 2017-02-20

## 2017-02-17 RX ADMIN — SILDENAFIL 20 MG: 20 TABLET ORAL at 09:02

## 2017-02-17 RX ADMIN — OXYCODONE HYDROCHLORIDE AND ACETAMINOPHEN 500 MG: 500 TABLET ORAL at 08:02

## 2017-02-17 RX ADMIN — LABETALOL HCL 300 MG: 300 TABLET, FILM COATED ORAL at 05:02

## 2017-02-17 RX ADMIN — FUROSEMIDE 80 MG: 10 INJECTION, SOLUTION INTRAMUSCULAR; INTRAVENOUS at 08:02

## 2017-02-17 RX ADMIN — OXYCODONE HYDROCHLORIDE AND ACETAMINOPHEN 500 MG: 500 TABLET ORAL at 09:02

## 2017-02-17 RX ADMIN — HYDRALAZINE HYDROCHLORIDE 100 MG: 50 TABLET ORAL at 09:02

## 2017-02-17 RX ADMIN — ASPIRIN 81 MG: 81 TABLET, COATED ORAL at 08:02

## 2017-02-17 RX ADMIN — OXYCODONE HYDROCHLORIDE 10 MG: 5 TABLET ORAL at 05:02

## 2017-02-17 RX ADMIN — HYDRALAZINE HYDROCHLORIDE 100 MG: 50 TABLET ORAL at 02:02

## 2017-02-17 RX ADMIN — HYDRALAZINE HYDROCHLORIDE 100 MG: 50 TABLET ORAL at 05:02

## 2017-02-17 RX ADMIN — DOCUSATE SODIUM 100 MG: 100 CAPSULE, LIQUID FILLED ORAL at 08:02

## 2017-02-17 RX ADMIN — CLONIDINE HYDROCHLORIDE 0.1 MG: 0.1 TABLET ORAL at 05:02

## 2017-02-17 RX ADMIN — SILDENAFIL 20 MG: 20 TABLET ORAL at 05:02

## 2017-02-17 RX ADMIN — OXYCODONE HYDROCHLORIDE 10 MG: 5 TABLET ORAL at 09:02

## 2017-02-17 RX ADMIN — WARFARIN SODIUM 2 MG: 2 TABLET ORAL at 05:02

## 2017-02-17 RX ADMIN — OXYCODONE HYDROCHLORIDE 10 MG: 5 TABLET ORAL at 10:02

## 2017-02-17 RX ADMIN — LABETALOL HCL 300 MG: 300 TABLET, FILM COATED ORAL at 02:02

## 2017-02-17 RX ADMIN — AMLODIPINE BESYLATE 5 MG: 5 TABLET ORAL at 08:02

## 2017-02-17 RX ADMIN — LABETALOL HCL 300 MG: 300 TABLET, FILM COATED ORAL at 09:02

## 2017-02-17 RX ADMIN — CLONIDINE HYDROCHLORIDE 0.1 MG: 0.1 TABLET ORAL at 09:02

## 2017-02-17 RX ADMIN — POLYETHYLENE GLYCOL 3350 17 G: 17 POWDER, FOR SOLUTION ORAL at 08:02

## 2017-02-17 RX ADMIN — CLONIDINE HYDROCHLORIDE 0.1 MG: 0.1 TABLET ORAL at 02:02

## 2017-02-17 RX ADMIN — FUROSEMIDE 80 MG: 10 INJECTION, SOLUTION INTRAMUSCULAR; INTRAVENOUS at 06:02

## 2017-02-17 RX ADMIN — SILDENAFIL 20 MG: 20 TABLET ORAL at 02:02

## 2017-02-17 NOTE — PLAN OF CARE
Problem: Patient Care Overview  Goal: Plan of Care Review  PHM: CHF, pulmonary HTN, CKD3, Hep B    PSH: ASD repair 9 y/o    Hospital Course:    2/8: Admit SICU Aortic valve replacement, Tricuspid valve repair, Redo sternotomy; 1L albumin, 2 amps bicarb, 2 unit PRBC  2/9: 2 PRBC, 1 amp bicarb, 1 500 5%Albumin, 1-100mL 25 %Albumin Levo/Milrinone off, wean Epi/Vaso, Heparin gtt started    Nursing:  * Accucheck Q6h  *Renal Function Panel q8h  *PTT q6h goal 60-80   Outcome: Ongoing (interventions implemented as appropriate)  Patient complained of back pain overnight relieved by PO PRN pain medication. Denies chest pain, SOB, or other pain discomfort.  Patient ambulating with one assist, Fall precautions in place. Continuing to encourage sternal precautions, IS, and ambulation. Patient remains free of falls or injury. No significant events. Plan do d/c to SNIF vs. Rehab. Patient verbalizes complete understanding of plan of care. Will continue to monitor.

## 2017-02-17 NOTE — PT/OT/SLP PROGRESS
"Physical Therapy  Treatment    Isidro Sheriffbar   MRN: [id number]   Admitting Diagnosis: S/P AVR (aortic valve replacement)    PT Received On: 17  PT Start Time: 859     PT Stop Time: 930    PT Total Time (min): 31 min       Billable Minutes:  Gait [id number] and Therapeutic Activity 21    Treatment Type: Treatment  PT/PTA: PTA     PTA Visit Number: 2       General Precautions: Standard, sternal, fall  Orthopedic Precautions: N/A   Braces:      Do you have any cultural, spiritual, Lutheran conflicts, given your current situation?:  (none)    Subjective:  Communicated with RN prior to session.  Pt stated he wanted a boost shake instead of breakfast    Pain Ratin/10 (pt stated he feels better each day but also continues to feel "woozy ")     Objective:   Patient found with: telemetry    Functional Mobility:  Bed Mobility:   Rolling/Turning Right: Stand by assistance  Scooting/Bridging: Stand by Assistance  Supine to Sit: Stand by Assistance    Transfers:  Sit <> Stand Assistance: Contact Guard Assistance  Sit <> Stand Assistive Device: No Assistive Device    Gait:   Gait Distance: 400 ft (x3 standing rests)  Assistance 1: Stand by Assistance  Gait Assistive Device: No device  Gait Pattern: 2-point gait  Gait Deviation(s): decreased lena, decreased step length, decreased stride length (pt O2 after ambulation 92%)    Balance:   Static Sit: FAIR-: Maintains without assist but inconsistent   Dynamic Sit: FAIR: Cannot move trunk without losing balance  Static Stand: FAIR+: Takes MINIMAL challenges from all directions  Dynamic stand: FAIR: Needs CONTACT GUARD during gait     Therapeutic Activities and Exercises:  Sternal precautions were reviewed with pt and reinforced during functional mobility.  Pt assisted w/ face washing and brushing teeth     AM-PAC 6 CLICK MOBILITY  How much help from another person does this patient currently need?   1 = Unable, Total/Dependent Assistance  2 = A lot, Maximum/Moderate " Assistance  3 = A little, Minimum/Contact Guard/Supervision  4 = None, Modified [place]/Independent    Turning over in bed (including adjusting bedclothes, sheets and blankets)?: 3  Sitting down on and standing up from a chair with arms (e.g., wheelchair, bedside commode, etc.): 3  Moving from lying on back to sitting on the side of the bed?: 3  Moving to and from a bed to a chair (including a wheelchair)?: 3  Need to walk in hospital room?: 3  Climbing 3-5 steps with a railing?: 3  Total Score: 18    AM-PAC Raw Score CMS G-Code Modifier Level of Impairment Assistance   6 % Total / Unable   7 - 9 CM 80 - 100% Maximal Assist   10 - 14 CL 60 - 80% Moderate Assist   15 - 19 CK 40 - 60% Moderate Assist   20 - 22 CJ 20 - 40% Minimal Assist   23 CI 1-20% SBA / CGA   24 CH 0% Independent/ Mod I     Patient left up in chair with call button in reach.    Assessment:  Isidro White is a 47 y.o. male with a medical diagnosis of S/P AVR (aortic valve replacement) and presents with problems listed below. Pt appeared stronger and healthier today, required no encouragement to walk. Pts voice sounded stronger and pt ambulated 400ft. Will continue to benefit from skilled PT to increase endurance and strengthen gait. O2 sat after ambulation w/o O2 WNL.    Rehab identified problem list/impairments: Rehab identified problem list/impairments: weakness, impaired endurance, pain    Rehab potential is good.    Activity tolerance: Good    Discharge recommendations: Discharge Facility/Level Of Care Needs:  (no further PT)     Barriers to discharge: Barriers to Discharge: None    Equipment recommendations: Equipment Needed After Discharge: none     GOALS:   Physical Therapy Goals        Problem: Physical Therapy Goal    Goal Priority Disciplines Outcome Goal Variances Interventions   Physical Therapy Goal     PT/OT, PT Ongoing (interventions implemented as appropriate)     Description:  Goals to be met by:  17    Patient will increase functional independence with mobility by performin. Supine to sit with Stand-by Assistance - met   2 . Sit to stand transfer with Supervision - not met  3. Gait  x 220 feet with Supervision  - not met                  PLAN:    Patient to be seen 6 x/week  to address the above listed problems via gait training, therapeutic activities, therapeutic exercises  Plan of Care expires: 17  Plan of Care reviewed with: patient         Jose Otero, KORIN  2017

## 2017-02-17 NOTE — DISCHARGE INSTRUCTIONS
[phone]. Appointment on Thursday, February 23, 2017 at 9:30am. Please bring Drivers license/ID and medications.     [facility]: [phone] ([address] [place], [place] [address], [place], [place]) - You will start on Wednesday February 22nd.  Your appointment time is 8:15am.  Your schedule is Monday, Wednesday, and Friday.    You can call Medicaid in March or April to ask them if you can apply for full coverage Medicaid ([phone]).

## 2017-02-17 NOTE — PLAN OF CARE
CM called [facility] [phone]. VERONICA spoke with Sierra to make follow up appt. Pt follow up appt is on Thursday, February 23, 2017 at 9:30am. She is requesting for CM to fax pt H&P, op note, and dc summary when available to [phone]. Pt will need to bring drivers license/ID and med list. VERONICA also verified to make sure they can monitor PT/INR and she stated they can. CM will put this information on AVS and update Pt.

## 2017-02-17 NOTE — PLAN OF CARE
Problem: Patient Care Overview  Goal: Plan of Care Review  PHM: CHF, pulmonary HTN, CKD3, Hep B    PSH: ASD repair 9 y/o    Hospital Course:    2/8: Admit SICU Aortic valve replacement, Tricuspid valve repair, Redo sternotomy; 1L albumin, 2 amps bicarb, 2 unit PRBC  2/9: 2 PRBC, 1 amp bicarb, 1 500 5%Albumin, 1-100mL 25 %Albumin Levo/Milrinone off, wean Epi/Vaso, Heparin gtt started    Nursing:  * Accucheck Q6h  *Renal Function Panel q8h  *PTT q6h goal 60-80   Outcome: Ongoing (interventions implemented as appropriate)  Pt resting in room throughout shift up in chair. Pt complains of pain, prn med given. No SOB or chest pain, on 3 L NC. SR-ST on Telemetry. VSS. Skin integrity intact and without breakdown. Call light in reach. Fall precautions in place, pt did not have any falls or injuries this shift. Dialysis this shift, 1.5L removed. Plan of care discussed with patient no questions at this time. Will continue to monitor.

## 2017-02-17 NOTE — PLAN OF CARE
Problem: Physical Therapy Goal  Goal: Physical Therapy Goal  Goals to be met by: 17    Patient will increase functional independence with mobility by performin. Supine to sit with Stand-by Assistance - met   2 . Sit to stand transfer with Supervision - not met  3. Gait x 220 feet with Supervision - not met      Pt continues to progress towards goals  KORIN Roy

## 2017-02-18 LAB
ANION GAP SERPL CALC-SCNC: 9 MMOL/L
APTT BLDCRRT: 29.7 SEC
APTT BLDCRRT: 32.2 SEC
BASOPHILS # BLD AUTO: 0.01 K/UL
BASOPHILS NFR BLD: 0.1 %
BUN SERPL-MCNC: 49 MG/DL
CALCIUM SERPL-MCNC: 8.3 MG/DL
CHLORIDE SERPL-SCNC: 101 MMOL/L
CO2 SERPL-SCNC: 23 MMOL/L
CREAT SERPL-MCNC: 6.2 MG/DL
DIFFERENTIAL METHOD: ABNORMAL
EOSINOPHIL # BLD AUTO: 0.4 K/UL
EOSINOPHIL NFR BLD: 4 %
ERYTHROCYTE [DISTWIDTH] IN BLOOD BY AUTOMATED COUNT: 16.3 %
EST. GFR  (AFRICAN AMERICAN): 11.4 ML/MIN/1.73 M^2
EST. GFR  (NON AFRICAN AMERICAN): 9.9 ML/MIN/1.73 M^2
GLUCOSE SERPL-MCNC: 95 MG/DL
HCT VFR BLD AUTO: 22.3 %
HGB BLD-MCNC: 7.3 G/DL
INR PPP: 1.4
LYMPHOCYTES # BLD AUTO: 0.8 K/UL
LYMPHOCYTES NFR BLD: 8.4 %
MAGNESIUM SERPL-MCNC: 2.1 MG/DL
MCH RBC QN AUTO: 30.3 PG
MCHC RBC AUTO-ENTMCNC: 32.7 %
MCV RBC AUTO: 93 FL
MONOCYTES # BLD AUTO: 1.7 K/UL
MONOCYTES NFR BLD: 17.4 %
NEUTROPHILS # BLD AUTO: 6.7 K/UL
NEUTROPHILS NFR BLD: 69.9 %
PHOSPHATE SERPL-MCNC: 5 MG/DL
PLATELET # BLD AUTO: 256 K/UL
PMV BLD AUTO: 10.3 FL
POCT GLUCOSE: 107 MG/DL (ref 70–110)
POCT GLUCOSE: 94 MG/DL (ref 70–110)
POTASSIUM SERPL-SCNC: 4.4 MMOL/L
PROTHROMBIN TIME: 14.4 SEC
RBC # BLD AUTO: 2.41 M/UL
SODIUM SERPL-SCNC: 133 MMOL/L
WBC # BLD AUTO: 9.65 K/UL

## 2017-02-18 PROCEDURE — 80048 BASIC METABOLIC PNL TOTAL CA: CPT

## 2017-02-18 PROCEDURE — 63600175 PHARM REV CODE 636 W HCPCS: Performed by: HOSPITALIST

## 2017-02-18 PROCEDURE — 90935 HEMODIALYSIS ONE EVALUATION: CPT | Mod: ,,, | Performed by: INTERNAL MEDICINE

## 2017-02-18 PROCEDURE — 25000003 PHARM REV CODE 250: Performed by: NURSE PRACTITIONER

## 2017-02-18 PROCEDURE — 83735 ASSAY OF MAGNESIUM: CPT

## 2017-02-18 PROCEDURE — 85730 THROMBOPLASTIN TIME PARTIAL: CPT | Mod: 91

## 2017-02-18 PROCEDURE — 25000003 PHARM REV CODE 250: Performed by: THORACIC SURGERY (CARDIOTHORACIC VASCULAR SURGERY)

## 2017-02-18 PROCEDURE — 85730 THROMBOPLASTIN TIME PARTIAL: CPT

## 2017-02-18 PROCEDURE — 63600175 PHARM REV CODE 636 W HCPCS: Performed by: STUDENT IN AN ORGANIZED HEALTH CARE EDUCATION/TRAINING PROGRAM

## 2017-02-18 PROCEDURE — 36415 COLL VENOUS BLD VENIPUNCTURE: CPT

## 2017-02-18 PROCEDURE — 80100016 HC MAINTENANCE HEMODIALYSIS

## 2017-02-18 PROCEDURE — 90935 HEMODIALYSIS ONE EVALUATION: CPT

## 2017-02-18 PROCEDURE — 85025 COMPLETE CBC W/AUTO DIFF WBC: CPT

## 2017-02-18 PROCEDURE — 85610 PROTHROMBIN TIME: CPT

## 2017-02-18 PROCEDURE — 25000003 PHARM REV CODE 250: Performed by: STUDENT IN AN ORGANIZED HEALTH CARE EDUCATION/TRAINING PROGRAM

## 2017-02-18 PROCEDURE — 84100 ASSAY OF PHOSPHORUS: CPT

## 2017-02-18 PROCEDURE — 36430 TRANSFUSION BLD/BLD COMPNT: CPT

## 2017-02-18 PROCEDURE — 25000003 PHARM REV CODE 250: Performed by: HOSPITALIST

## 2017-02-18 PROCEDURE — 20600001 HC STEP DOWN PRIVATE ROOM

## 2017-02-18 PROCEDURE — 25000003 PHARM REV CODE 250: Performed by: INTERNAL MEDICINE

## 2017-02-18 PROCEDURE — 27000207 HC ISOLATION

## 2017-02-18 RX ORDER — WARFARIN SODIUM 5 MG/1
5 TABLET ORAL DAILY
Status: DISCONTINUED | OUTPATIENT
Start: 2017-02-18 | End: 2017-02-19

## 2017-02-18 RX ORDER — HEPARIN SODIUM 10000 [USP'U]/100ML
1700 INJECTION, SOLUTION INTRAVENOUS CONTINUOUS
Status: DISCONTINUED | OUTPATIENT
Start: 2017-02-18 | End: 2017-02-21

## 2017-02-18 RX ADMIN — HEPARIN SODIUM 1000 UNITS: 1000 INJECTION, SOLUTION INTRAVENOUS; SUBCUTANEOUS at 12:02

## 2017-02-18 RX ADMIN — SILDENAFIL 20 MG: 20 TABLET ORAL at 01:02

## 2017-02-18 RX ADMIN — HYDRALAZINE HYDROCHLORIDE 100 MG: 50 TABLET ORAL at 08:02

## 2017-02-18 RX ADMIN — DOCUSATE SODIUM 100 MG: 100 CAPSULE, LIQUID FILLED ORAL at 08:02

## 2017-02-18 RX ADMIN — WARFARIN SODIUM 5 MG: 5 TABLET ORAL at 06:02

## 2017-02-18 RX ADMIN — FUROSEMIDE 80 MG: 10 INJECTION, SOLUTION INTRAMUSCULAR; INTRAVENOUS at 06:02

## 2017-02-18 RX ADMIN — ERYTHROPOIETIN 10000 UNITS: 10000 INJECTION, SOLUTION INTRAVENOUS; SUBCUTANEOUS at 12:02

## 2017-02-18 RX ADMIN — CLONIDINE HYDROCHLORIDE 0.1 MG: 0.1 TABLET ORAL at 01:02

## 2017-02-18 RX ADMIN — OXYCODONE HYDROCHLORIDE 10 MG: 5 TABLET ORAL at 08:02

## 2017-02-18 RX ADMIN — SILDENAFIL 20 MG: 20 TABLET ORAL at 08:02

## 2017-02-18 RX ADMIN — FUROSEMIDE 80 MG: 10 INJECTION, SOLUTION INTRAMUSCULAR; INTRAVENOUS at 01:02

## 2017-02-18 RX ADMIN — LABETALOL HCL 300 MG: 300 TABLET, FILM COATED ORAL at 01:02

## 2017-02-18 RX ADMIN — OXYCODONE HYDROCHLORIDE AND ACETAMINOPHEN 500 MG: 500 TABLET ORAL at 01:02

## 2017-02-18 RX ADMIN — CLONIDINE HYDROCHLORIDE 0.1 MG: 0.1 TABLET ORAL at 06:02

## 2017-02-18 RX ADMIN — LABETALOL HCL 300 MG: 300 TABLET, FILM COATED ORAL at 06:02

## 2017-02-18 RX ADMIN — SODIUM CHLORIDE 300 ML: 0.9 INJECTION, SOLUTION INTRAVENOUS at 12:02

## 2017-02-18 RX ADMIN — SILDENAFIL 20 MG: 20 TABLET ORAL at 06:02

## 2017-02-18 RX ADMIN — OXYCODONE HYDROCHLORIDE 10 MG: 5 TABLET ORAL at 06:02

## 2017-02-18 RX ADMIN — HYDRALAZINE HYDROCHLORIDE 100 MG: 50 TABLET ORAL at 06:02

## 2017-02-18 RX ADMIN — CLONIDINE HYDROCHLORIDE 0.1 MG: 0.1 TABLET ORAL at 08:02

## 2017-02-18 RX ADMIN — OXYCODONE HYDROCHLORIDE AND ACETAMINOPHEN 500 MG: 500 TABLET ORAL at 08:02

## 2017-02-18 RX ADMIN — LABETALOL HCL 300 MG: 300 TABLET, FILM COATED ORAL at 08:02

## 2017-02-18 RX ADMIN — OXYCODONE HYDROCHLORIDE 10 MG: 5 TABLET ORAL at 01:02

## 2017-02-18 RX ADMIN — SODIUM FERRIC GLUCONATE COMPLEX 125 MG: 12.5 INJECTION INTRAVENOUS at 05:02

## 2017-02-18 RX ADMIN — ASPIRIN 81 MG: 81 TABLET, COATED ORAL at 01:02

## 2017-02-18 RX ADMIN — HEPARIN SODIUM AND DEXTROSE 800 UNITS/HR: 10000; 5 INJECTION INTRAVENOUS at 01:02

## 2017-02-18 RX ADMIN — HYDRALAZINE HYDROCHLORIDE 100 MG: 50 TABLET ORAL at 01:02

## 2017-02-18 NOTE — PROGRESS NOTES
Progress Note  Cardiothoracic Surgery    Admit Date: 2/2/2017  Post-operative Day: 10 Days Post-Op  Hospital Day: 17    SUBJECTIVE: No acute events overnights. Pt NSR on monitor.      Follow-up For: Procedure(s) (LRB):  REPLACEMENT-VALVE-AORTIC (N/A)  REDO STERNOTOMY WITH AORTIC VALVE REPLACEMENT/redo sternotomy (N/A)  REPAIR-VALVE-TRICUSPID    Scheduled Meds:   sodium chloride 0.9%   Intravenous Once    amlodipine  5 mg Oral Daily    ascorbic acid (vitamin C)  500 mg Oral BID    aspirin  81 mg Oral Daily    cloNIDine  0.1 mg Oral TID    docusate sodium  100 mg Oral BID    epoetin vikki (PROCRIT) injection  10,000 Units Intravenous Every Tues, Thurs, Sat    ferric gluconate (FERRLECIT) IVPB  125 mg Intravenous Every Tues, Thurs, Sat    furosemide  80 mg Intravenous BID    hydrALAZINE  100 mg Oral Q8H    labetalol  300 mg Oral Q8H    polyethylene glycol  17 g Per G Tube BID    sildenafil  20 mg Oral TID    warfarin  5 mg Oral Daily     Infusions/Drips:   heparin (porcine) in 5 % dex       PRN Meds: sodium chloride, sodium chloride 0.9%, acetaminophen, albuterol-ipratropium 2.5mg-0.5mg/3mL, bisacodyl, dextrose 50%, glucagon (human recombinant), heparin (porcine), hydrALAZINE, insulin aspart, labetalol, lactulose, metoclopramide HCl, ondansetron, oxycodone, oxycodone    Review of patient's allergies indicates:  No Known Allergies    OBJECTIVE:     Vital Signs (Most Recent)  Temp: 98.3 °F (36.8 °C) (02/18/17 1226)  Pulse: 86 (02/18/17 1226)  Resp: 18 (02/18/17 1226)  BP: 131/78 (02/18/17 1226)  SpO2: (!) 93 % (02/18/17 0730)    Admission Weight: 86.3 kg (190 lb 4.1 oz) (02/02/17 2035)   Most Recent Weight: 79 kg (174 lb 2.6 oz) (02/17/17 0500)    Vital Signs Range (Last 24H):  Temp:  [98.2 °F (36.8 °C)-98.8 °F (37.1 °C)]   Pulse:  [81-90]   Resp:  [16-18]   BP: ()/(49-78)   SpO2:  [93 %-98 %]     I & O (Last 24H):    Intake/Output Summary (Last 24 hours) at 02/18/17 1257  Last data filed at 02/18/17  1226   Gross per 24 hour   Intake             1160 ml   Output             2601 ml   Net            -1441 ml     Physical Exam:  General: fatigued, no distress  Nose: no discharge, no crusting or bleeding points  Lungs:  clear to auscultation bilaterally and normal respiratory effort  Chest Wall: no tenderness  Heart: regular rate and rhythm, S1, S2 normal, no murmur, rub or gallop  Abdomen: soft non tender, + distention  Skin: Skin color, texture, turgor normal. No rashes or lesions  Neurologic: Alert and oriented. Thought content appropriate    Wound/Incision:  clean, dry, intact    Laboratory:  CBC:     Recent Labs  Lab 02/18/17 0338   WBC 9.65   RBC 2.41*   HGB 7.3*   HCT 22.3*      MCV 93   MCH 30.3   MCHC 32.7     BMP:     Recent Labs  Lab 02/18/17 0338   GLU 95   *   K 4.4      CO2 23   BUN 49*   CREATININE 6.2*   CALCIUM 8.3*   MG 2.1     Coagulation:     Recent Labs  Lab 02/18/17 0338 02/18/17  1200   INR 1.4*  --    APTT  --  29.7       Diagnostic Results:  X-Ray: Reviewed    ASSESSMENT/PLAN:     Assessment:   Active Hospital Problems    Diagnosis    *S/P AVR (aortic valve replacement)    ESRD (end stage renal disease)    Hyperglycemia    Postprocedural hypotension    Nonrheumatic aortic valve stenosis    Adult congenital heart disease    Pulmonary HTN    CKD stage 5 secondary to hypertension    Uncontrolled hypertension    Hypertensive cardiomegaly with heart failure     Plan:   Sternal Precautions  PT/OT  Ambulate  S/p AVR: continue asa, lopressor and holding anticoagulation   ESRD: Pt tolerated HD and Nephro following. Continue lasix  Will need to change dialysis schedule to MWF  Pulmonary HTN: sildenafil  HTN: decreased norvasc, continue clonidine, hydralazine, labetalol and prns  Added more to bowel regiem  Discharge planning ongoing-SNF/Rehab

## 2017-02-18 NOTE — PLAN OF CARE
Problem: Pressure Ulcer Risk (Yk Scale) (Adult,Obstetrics,Pediatric)  Goal: Skin Integrity  Patient will demonstrate the desired outcomes by discharge/transition of care.   Outcome: Ongoing (interventions implemented as appropriate)  Pt VSS; remained free of falls/trauma/injuries this shift. Pt denied any SOB, chest pain or any type of discomfort. Currently in HD; also diuresing with IVP lasix. Plan of care reviewed with pt; verbalized understanding. No signs of acute distress noted. Will continue to monitor.

## 2017-02-18 NOTE — PLAN OF CARE
Problem: Patient Care Overview  Goal: Plan of Care Review  PHM: CHF, pulmonary HTN, CKD3, Hep B    PSH: ASD repair 9 y/o    Hospital Course:    2/8: Admit SICU Aortic valve replacement, Tricuspid valve repair, Redo sternotomy; 1L albumin, 2 amps bicarb, 2 unit PRBC  2/9: 2 PRBC, 1 amp bicarb, 1 500 5%Albumin, 1-100mL 25 %Albumin Levo/Milrinone off, wean Epi/Vaso, Heparin gtt started    Nursing:  * Accucheck Q6h  *Renal Function Panel q8h  *PTT q6h goal 60-80   Outcome: Ongoing (interventions implemented as appropriate)  HD 3.5 hours, 2 Liters UF removed and tolerated well. Procrit given with Tx. Right CVC flushed with NS x 2 and locked with Heparin 1000 units/ml to fill each lumen and capped.

## 2017-02-18 NOTE — PROGRESS NOTES
Progress Note  Cardiothoracic Surgery    Admit Date: 2/2/2017  Post-operative Day: 10 Days Post-Op  Hospital Day: 17    SUBJECTIVE: No acute events overnights. Pt NSR on monitor.      Follow-up For: Procedure(s) (LRB):  REPLACEMENT-VALVE-AORTIC (N/A)  REDO STERNOTOMY WITH AORTIC VALVE REPLACEMENT/redo sternotomy (N/A)  REPAIR-VALVE-TRICUSPID    Scheduled Meds:   sodium chloride 0.9%   Intravenous Once    amlodipine  5 mg Oral Daily    ascorbic acid (vitamin C)  500 mg Oral BID    aspirin  81 mg Oral Daily    cloNIDine  0.1 mg Oral TID    docusate sodium  100 mg Oral BID    epoetin vikki (PROCRIT) injection  10,000 Units Intravenous Every Tues, Thurs, Sat    ferric gluconate (FERRLECIT) IVPB  125 mg Intravenous Every Tues, Thurs, Sat    furosemide  80 mg Intravenous BID    hydrALAZINE  100 mg Oral Q8H    labetalol  300 mg Oral Q8H    polyethylene glycol  17 g Per G Tube BID    sildenafil  20 mg Oral TID    warfarin  5 mg Oral Daily     Infusions/Drips:   heparin (porcine) in 5 % dex       PRN Meds: sodium chloride, sodium chloride 0.9%, acetaminophen, albuterol-ipratropium 2.5mg-0.5mg/3mL, bisacodyl, dextrose 50%, glucagon (human recombinant), heparin (porcine), hydrALAZINE, insulin aspart, labetalol, lactulose, metoclopramide HCl, ondansetron, oxycodone, oxycodone    Review of patient's allergies indicates:  No Known Allergies    OBJECTIVE:     Vital Signs (Most Recent)  Temp: 98.3 °F (36.8 °C) (02/18/17 1226)  Pulse: 86 (02/18/17 1226)  Resp: 18 (02/18/17 1226)  BP: 131/78 (02/18/17 1226)  SpO2: (!) 93 % (02/18/17 0730)    Admission Weight: 86.3 kg (190 lb 4.1 oz) (02/02/17 2035)   Most Recent Weight: 79 kg (174 lb 2.6 oz) (02/17/17 0500)    Vital Signs Range (Last 24H):  Temp:  [98.2 °F (36.8 °C)-98.8 °F (37.1 °C)]   Pulse:  [81-90]   Resp:  [16-18]   BP: ()/(49-78)   SpO2:  [93 %-98 %]     I & O (Last 24H):    Intake/Output Summary (Last 24 hours) at 02/18/17 1256  Last data filed at 02/18/17  1226   Gross per 24 hour   Intake             1160 ml   Output             2601 ml   Net            -1441 ml     Physical Exam:  General: fatigued, no distress  Nose: no discharge, no crusting or bleeding points  Lungs:  clear to auscultation bilaterally and normal respiratory effort  Chest Wall: no tenderness  Heart: regular rate and rhythm, S1, S2 normal, no murmur, rub or gallop  Abdomen: soft non tender, + distention  Skin: Skin color, texture, turgor normal. No rashes or lesions  Neurologic: Alert and oriented. Thought content appropriate    Wound/Incision:  clean, dry, intact    Laboratory:  CBC:     Recent Labs  Lab 02/18/17 0338   WBC 9.65   RBC 2.41*   HGB 7.3*   HCT 22.3*      MCV 93   MCH 30.3   MCHC 32.7     BMP:     Recent Labs  Lab 02/18/17 0338   GLU 95   *   K 4.4      CO2 23   BUN 49*   CREATININE 6.2*   CALCIUM 8.3*   MG 2.1     Coagulation:     Recent Labs  Lab 02/18/17 0338 02/18/17  1200   INR 1.4*  --    APTT  --  29.7       Diagnostic Results:  X-Ray: Reviewed    ASSESSMENT/PLAN:     Assessment:   Active Hospital Problems    Diagnosis    *S/P AVR (aortic valve replacement)    ESRD (end stage renal disease)    Hyperglycemia    Postprocedural hypotension    Nonrheumatic aortic valve stenosis    Adult congenital heart disease    Pulmonary HTN    CKD stage 5 secondary to hypertension    Uncontrolled hypertension    Hypertensive cardiomegaly with heart failure     Plan:   Sternal Precautions  PT/OT  Ambulate  S/p AVR: continue asa, lopressor and restarting anticoagulation   Heparin gtt and coumadin 5 tonight, INR trended down with patient increasing appetite and drinking boost   ESRD: Pt tolerated HD and Nephro following.   Continue lasix making Urine  Will need to change dialysis schedule to MWF  Pulmonary HTN: sildenafil  HTN: continue norvasc, clonidine, hydralazine, labetalol and prns  Discharge planning ongoing-SNF/Rehab

## 2017-02-18 NOTE — PROGRESS NOTES
Maintenance HD treatment initiated via Right CVC which aspirated and flushed easily, lines secured.

## 2017-02-18 NOTE — PLAN OF CARE
Problem: Patient Care Overview  Goal: Plan of Care Review  PHM: CHF, pulmonary HTN, CKD3, Hep B    PSH: ASD repair 7 y/o    Hospital Course:    2/8: Admit SICU Aortic valve replacement, Tricuspid valve repair, Redo sternotomy; 1L albumin, 2 amps bicarb, 2 unit PRBC  2/9: 2 PRBC, 1 amp bicarb, 1 500 5%Albumin, 1-100mL 25 %Albumin Levo/Milrinone off, wean Epi/Vaso, Heparin gtt started    Nursing:  * Accucheck Q6h  *Renal Function Panel q8h  *PTT q6h goal 60-80   Outcome: Ongoing (interventions implemented as appropriate)  Plan of care discussed with patient.  Patient ambulating independently, fall precautions in place.Continuing to encourage sternal precautions, IS, and ambulation. Patient has no complaints of pain. Discussed medications and care. Patient has no questions at this time. Will continue to monitor.

## 2017-02-19 LAB
ANION GAP SERPL CALC-SCNC: 8 MMOL/L
APTT BLDCRRT: 30.5 SEC
APTT BLDCRRT: 36 SEC
APTT BLDCRRT: 36 SEC
APTT BLDCRRT: 54.4 SEC
BASOPHILS # BLD AUTO: 0.02 K/UL
BASOPHILS NFR BLD: 0.2 %
BUN SERPL-MCNC: 30 MG/DL
CALCIUM SERPL-MCNC: 8.3 MG/DL
CHLORIDE SERPL-SCNC: 103 MMOL/L
CO2 SERPL-SCNC: 24 MMOL/L
CREAT SERPL-MCNC: 4.9 MG/DL
DIFFERENTIAL METHOD: ABNORMAL
EOSINOPHIL # BLD AUTO: 0.3 K/UL
EOSINOPHIL NFR BLD: 2.9 %
ERYTHROCYTE [DISTWIDTH] IN BLOOD BY AUTOMATED COUNT: 16.7 %
EST. GFR  (AFRICAN AMERICAN): 15.2 ML/MIN/1.73 M^2
EST. GFR  (NON AFRICAN AMERICAN): 13.2 ML/MIN/1.73 M^2
GLUCOSE SERPL-MCNC: 90 MG/DL
HCT VFR BLD AUTO: 22.4 %
HGB BLD-MCNC: 7.6 G/DL
INR PPP: 1.3
LYMPHOCYTES # BLD AUTO: 1.1 K/UL
LYMPHOCYTES NFR BLD: 11.1 %
MAGNESIUM SERPL-MCNC: 1.8 MG/DL
MCH RBC QN AUTO: 30.9 PG
MCHC RBC AUTO-ENTMCNC: 33.9 %
MCV RBC AUTO: 91 FL
MONOCYTES # BLD AUTO: 1.4 K/UL
MONOCYTES NFR BLD: 15 %
NEUTROPHILS # BLD AUTO: 6.7 K/UL
NEUTROPHILS NFR BLD: 70.5 %
PHOSPHATE SERPL-MCNC: 3.5 MG/DL
PLATELET # BLD AUTO: 292 K/UL
PMV BLD AUTO: 9.6 FL
POTASSIUM SERPL-SCNC: 4.3 MMOL/L
PROTHROMBIN TIME: 13.1 SEC
RBC # BLD AUTO: 2.46 M/UL
SODIUM SERPL-SCNC: 135 MMOL/L
WBC # BLD AUTO: 9.57 K/UL

## 2017-02-19 PROCEDURE — 85610 PROTHROMBIN TIME: CPT

## 2017-02-19 PROCEDURE — 80048 BASIC METABOLIC PNL TOTAL CA: CPT

## 2017-02-19 PROCEDURE — 84100 ASSAY OF PHOSPHORUS: CPT

## 2017-02-19 PROCEDURE — 25000003 PHARM REV CODE 250: Performed by: STUDENT IN AN ORGANIZED HEALTH CARE EDUCATION/TRAINING PROGRAM

## 2017-02-19 PROCEDURE — 36415 COLL VENOUS BLD VENIPUNCTURE: CPT

## 2017-02-19 PROCEDURE — 25000003 PHARM REV CODE 250: Performed by: NURSE PRACTITIONER

## 2017-02-19 PROCEDURE — 97116 GAIT TRAINING THERAPY: CPT

## 2017-02-19 PROCEDURE — 20600001 HC STEP DOWN PRIVATE ROOM

## 2017-02-19 PROCEDURE — 25000003 PHARM REV CODE 250: Performed by: THORACIC SURGERY (CARDIOTHORACIC VASCULAR SURGERY)

## 2017-02-19 PROCEDURE — 63600175 PHARM REV CODE 636 W HCPCS: Performed by: STUDENT IN AN ORGANIZED HEALTH CARE EDUCATION/TRAINING PROGRAM

## 2017-02-19 PROCEDURE — 85730 THROMBOPLASTIN TIME PARTIAL: CPT

## 2017-02-19 PROCEDURE — 83735 ASSAY OF MAGNESIUM: CPT

## 2017-02-19 PROCEDURE — 85025 COMPLETE CBC W/AUTO DIFF WBC: CPT

## 2017-02-19 RX ORDER — WARFARIN 7.5 MG/1
7.5 TABLET ORAL DAILY
Status: DISCONTINUED | OUTPATIENT
Start: 2017-02-19 | End: 2017-02-21 | Stop reason: HOSPADM

## 2017-02-19 RX ORDER — SODIUM CHLORIDE 9 MG/ML
INJECTION, SOLUTION INTRAVENOUS
Status: DISCONTINUED | OUTPATIENT
Start: 2017-02-19 | End: 2017-02-21

## 2017-02-19 RX ORDER — SODIUM CHLORIDE 9 MG/ML
INJECTION, SOLUTION INTRAVENOUS ONCE
Status: DISCONTINUED | OUTPATIENT
Start: 2017-02-19 | End: 2017-02-20

## 2017-02-19 RX ORDER — CALCIUM CARBONATE 200(500)MG
500 TABLET,CHEWABLE ORAL 3 TIMES DAILY PRN
Status: DISCONTINUED | OUTPATIENT
Start: 2017-02-19 | End: 2017-02-21

## 2017-02-19 RX ADMIN — POLYETHYLENE GLYCOL 3350 17 G: 17 POWDER, FOR SOLUTION ORAL at 09:02

## 2017-02-19 RX ADMIN — HYDRALAZINE HYDROCHLORIDE 100 MG: 50 TABLET ORAL at 05:02

## 2017-02-19 RX ADMIN — FUROSEMIDE 80 MG: 10 INJECTION, SOLUTION INTRAMUSCULAR; INTRAVENOUS at 09:02

## 2017-02-19 RX ADMIN — WARFARIN SODIUM 7.5 MG: 7.5 TABLET ORAL at 06:02

## 2017-02-19 RX ADMIN — DOCUSATE SODIUM 100 MG: 100 CAPSULE, LIQUID FILLED ORAL at 09:02

## 2017-02-19 RX ADMIN — ASPIRIN 81 MG: 81 TABLET, COATED ORAL at 09:02

## 2017-02-19 RX ADMIN — SILDENAFIL 20 MG: 20 TABLET ORAL at 09:02

## 2017-02-19 RX ADMIN — HYDRALAZINE HYDROCHLORIDE 100 MG: 50 TABLET ORAL at 09:02

## 2017-02-19 RX ADMIN — HYDRALAZINE HYDROCHLORIDE 100 MG: 50 TABLET ORAL at 03:02

## 2017-02-19 RX ADMIN — LABETALOL HCL 300 MG: 300 TABLET, FILM COATED ORAL at 05:02

## 2017-02-19 RX ADMIN — CALCIUM CARBONATE (ANTACID) CHEW TAB 500 MG 500 MG: 500 CHEW TAB at 07:02

## 2017-02-19 RX ADMIN — SILDENAFIL 20 MG: 20 TABLET ORAL at 03:02

## 2017-02-19 RX ADMIN — SILDENAFIL 20 MG: 20 TABLET ORAL at 05:02

## 2017-02-19 RX ADMIN — OXYCODONE HYDROCHLORIDE AND ACETAMINOPHEN 500 MG: 500 TABLET ORAL at 09:02

## 2017-02-19 RX ADMIN — OXYCODONE HYDROCHLORIDE 10 MG: 5 TABLET ORAL at 05:02

## 2017-02-19 RX ADMIN — CLONIDINE HYDROCHLORIDE 0.1 MG: 0.1 TABLET ORAL at 05:02

## 2017-02-19 RX ADMIN — CLONIDINE HYDROCHLORIDE 0.1 MG: 0.1 TABLET ORAL at 09:02

## 2017-02-19 RX ADMIN — CLONIDINE HYDROCHLORIDE 0.1 MG: 0.1 TABLET ORAL at 03:02

## 2017-02-19 RX ADMIN — LABETALOL HCL 300 MG: 300 TABLET, FILM COATED ORAL at 03:02

## 2017-02-19 RX ADMIN — AMLODIPINE BESYLATE 5 MG: 5 TABLET ORAL at 09:02

## 2017-02-19 RX ADMIN — HEPARIN SODIUM AND DEXTROSE 1800 UNITS/HR: 10000; 5 INJECTION INTRAVENOUS at 11:02

## 2017-02-19 RX ADMIN — OXYCODONE HYDROCHLORIDE 10 MG: 5 TABLET ORAL at 07:02

## 2017-02-19 RX ADMIN — OXYCODONE HYDROCHLORIDE 10 MG: 5 TABLET ORAL at 03:02

## 2017-02-19 RX ADMIN — LABETALOL HCL 300 MG: 300 TABLET, FILM COATED ORAL at 09:02

## 2017-02-19 NOTE — PLAN OF CARE
Problem: Patient Care Overview  Goal: Plan of Care Review  PHM: CHF, pulmonary HTN, CKD3, Hep B    PSH: ASD repair 9 y/o    Hospital Course:    2/8: Admit SICU Aortic valve replacement, Tricuspid valve repair, Redo sternotomy; 1L albumin, 2 amps bicarb, 2 unit PRBC  2/9: 2 PRBC, 1 amp bicarb, 1 500 5%Albumin, 1-100mL 25 %Albumin Levo/Milrinone off, wean Epi/Vaso, Heparin gtt started    Nursing:  * Accucheck Q6h  *Renal Function Panel q8h  *PTT q6h goal 60-80   Outcome: Ongoing (interventions implemented as appropriate)  Plan of care reviewed with patient. Patient has no complaints at this time. Patient remains on heparin drip until INR therapeutic. INR today is 1.3. Patient ambulatory independently in room. Patient ambulated in hallway with therapy. Patient remains free of falls and injury.

## 2017-02-19 NOTE — PLAN OF CARE
Problem: Patient Care Overview  Goal: Plan of Care Review  PHM: CHF, pulmonary HTN, CKD3, Hep B    PSH: ASD repair 7 y/o    Hospital Course:    2/8: Admit SICU Aortic valve replacement, Tricuspid valve repair, Redo sternotomy; 1L albumin, 2 amps bicarb, 2 unit PRBC  2/9: 2 PRBC, 1 amp bicarb, 1 500 5%Albumin, 1-100mL 25 %Albumin Levo/Milrinone off, wean Epi/Vaso, Heparin gtt started    Nursing:  * Accucheck Q6h  *Renal Function Panel q8h  *PTT q6h goal 60-80   Outcome: Ongoing (interventions implemented as appropriate)  Plan of care reviewed with patient/family; all questions and concerns addressed; no distress at present.

## 2017-02-19 NOTE — PROGRESS NOTES
Progress Note  Cardiothoracic Surgery    Admit Date: 2/2/2017  Post-operative Day: 11 Days Post-Op  Hospital Day: 18    SUBJECTIVE: No acute events overnights. Pt NSR on monitor.      Follow-up For: Procedure(s) (LRB):  REPLACEMENT-VALVE-AORTIC (N/A)  REDO STERNOTOMY WITH AORTIC VALVE REPLACEMENT/redo sternotomy (N/A)  REPAIR-VALVE-TRICUSPID    Scheduled Meds:   sodium chloride 0.9%   Intravenous Once    amlodipine  5 mg Oral Daily    ascorbic acid (vitamin C)  500 mg Oral BID    aspirin  81 mg Oral Daily    cloNIDine  0.1 mg Oral TID    docusate sodium  100 mg Oral BID    epoetin vikki (PROCRIT) injection  10,000 Units Intravenous Every Tues, Thurs, Sat    ferric gluconate (FERRLECIT) IVPB  125 mg Intravenous Every Tues, Thurs, Sat    furosemide  80 mg Intravenous BID    hydrALAZINE  100 mg Oral Q8H    labetalol  300 mg Oral Q8H    polyethylene glycol  17 g Per G Tube BID    sildenafil  20 mg Oral TID    warfarin  5 mg Oral Daily     Infusions/Drips:   heparin (porcine) in 5 % dex 1,200 Units/hr (02/19/17 0334)     PRN Meds: sodium chloride, sodium chloride 0.9%, acetaminophen, albuterol-ipratropium 2.5mg-0.5mg/3mL, bisacodyl, dextrose 50%, glucagon (human recombinant), heparin (porcine), hydrALAZINE, insulin aspart, labetalol, lactulose, metoclopramide HCl, ondansetron, oxycodone, oxycodone    Review of patient's allergies indicates:  No Known Allergies    OBJECTIVE:     Vital Signs (Most Recent)  Temp: 98.9 °F (37.2 °C) (02/19/17 0753)  Pulse: 86 (02/19/17 0753)  Resp: 18 (02/19/17 0753)  BP: (!) 101/57 (02/19/17 0753)  SpO2: (!) 90 % (02/19/17 0753)    Admission Weight: 86.3 kg (190 lb 4.1 oz) (02/02/17 2035)   Most Recent Weight: 75 kg (165 lb 5.5 oz) (02/19/17 0500)    Vital Signs Range (Last 24H):  Temp:  [97.9 °F (36.6 °C)-98.9 °F (37.2 °C)]   Pulse:  [81-94]   Resp:  [17-18]   BP: ()/(50-78)   SpO2:  [90 %-95 %]     I & O (Last 24H):    Intake/Output Summary (Last 24 hours) at 02/19/17  0908  Last data filed at 02/19/17 0600   Gross per 24 hour   Intake           1465.3 ml   Output             2600 ml   Net          -1134.7 ml     Physical Exam:  General: fatigued, no distress  Nose: no discharge, no crusting or bleeding points  Lungs:  clear to auscultation bilaterally and normal respiratory effort  Chest Wall: no tenderness  Heart: regular rate and rhythm, S1, S2 normal, no murmur, rub or gallop  Abdomen: soft non tender, + distention  Skin: Skin color, texture, turgor normal. No rashes or lesions  Neurologic: Alert and oriented. Thought content appropriate    Wound/Incision:  clean, dry, intact    Laboratory:  CBC:     Recent Labs  Lab 02/19/17 0235   WBC 9.57   RBC 2.46*   HGB 7.6*   HCT 22.4*      MCV 91   MCH 30.9   MCHC 33.9     BMP:     Recent Labs  Lab 02/19/17 0235   GLU 90   *   K 4.3      CO2 24   BUN 30*   CREATININE 4.9*   CALCIUM 8.3*   MG 1.8     Coagulation:     Recent Labs  Lab 02/19/17 0235   INR 1.3*   APTT 30.5       Diagnostic Results:  X-Ray: Reviewed    ASSESSMENT/PLAN:     Assessment:   Active Hospital Problems    Diagnosis    *S/P AVR (aortic valve replacement)    ESRD (end stage renal disease)    Hyperglycemia    Postprocedural hypotension    Nonrheumatic aortic valve stenosis    Adult congenital heart disease    Pulmonary HTN    CKD stage 5 secondary to hypertension    Uncontrolled hypertension    Hypertensive cardiomegaly with heart failure     Plan:   Sternal Precautions  PT/OT  Ambulate  S/p AVR: continue asa, lopressor and restarting anticoagulation   Heparin gtt @ 1800 and coumadin 7.5 tonight, INR trended down   ESRD: Pt tolerated HD and Nephro following.   D/c lasix since anuric   Will need to change dialysis schedule to MWF  Pulmonary HTN: sildenafil  HTN: continue norvasc, clonidine, hydralazine, labetalol and prns  Discharge planning ongoing-SNF/Rehab

## 2017-02-20 LAB
ANION GAP SERPL CALC-SCNC: 12 MMOL/L
APTT BLDCRRT: 101.9 SEC
APTT BLDCRRT: 70.9 SEC
APTT BLDCRRT: 75.2 SEC
APTT BLDCRRT: 88.3 SEC
BASOPHILS # BLD AUTO: 0.01 K/UL
BASOPHILS NFR BLD: 0.1 %
BUN SERPL-MCNC: 41 MG/DL
CALCIUM SERPL-MCNC: 8.5 MG/DL
CHLORIDE SERPL-SCNC: 100 MMOL/L
CO2 SERPL-SCNC: 21 MMOL/L
CREAT SERPL-MCNC: 6.8 MG/DL
DIFFERENTIAL METHOD: ABNORMAL
EOSINOPHIL # BLD AUTO: 0.4 K/UL
EOSINOPHIL NFR BLD: 3.5 %
ERYTHROCYTE [DISTWIDTH] IN BLOOD BY AUTOMATED COUNT: 16.8 %
EST. GFR  (AFRICAN AMERICAN): 10.2 ML/MIN/1.73 M^2
EST. GFR  (NON AFRICAN AMERICAN): 8.9 ML/MIN/1.73 M^2
GLUCOSE SERPL-MCNC: 102 MG/DL
HCT VFR BLD AUTO: 23.2 %
HGB BLD-MCNC: 7.6 G/DL
INR PPP: 1.3
LYMPHOCYTES # BLD AUTO: 1.3 K/UL
LYMPHOCYTES NFR BLD: 11.8 %
MAGNESIUM SERPL-MCNC: 2.1 MG/DL
MCH RBC QN AUTO: 29.8 PG
MCHC RBC AUTO-ENTMCNC: 32.8 %
MCV RBC AUTO: 91 FL
MONOCYTES # BLD AUTO: 1.3 K/UL
MONOCYTES NFR BLD: 11.6 %
NEUTROPHILS # BLD AUTO: 7.9 K/UL
NEUTROPHILS NFR BLD: 72.7 %
PHOSPHATE SERPL-MCNC: 4.5 MG/DL
PLATELET # BLD AUTO: 287 K/UL
PMV BLD AUTO: 9 FL
POTASSIUM SERPL-SCNC: 4.6 MMOL/L
PROTHROMBIN TIME: 13.2 SEC
RBC # BLD AUTO: 2.55 M/UL
SODIUM SERPL-SCNC: 133 MMOL/L
WBC # BLD AUTO: 10.86 K/UL

## 2017-02-20 PROCEDURE — 90935 HEMODIALYSIS ONE EVALUATION: CPT

## 2017-02-20 PROCEDURE — 93010 ELECTROCARDIOGRAM REPORT: CPT | Mod: ,,, | Performed by: INTERNAL MEDICINE

## 2017-02-20 PROCEDURE — 25000003 PHARM REV CODE 250: Performed by: THORACIC SURGERY (CARDIOTHORACIC VASCULAR SURGERY)

## 2017-02-20 PROCEDURE — 84100 ASSAY OF PHOSPHORUS: CPT

## 2017-02-20 PROCEDURE — 80100016 HC MAINTENANCE HEMODIALYSIS

## 2017-02-20 PROCEDURE — 25000003 PHARM REV CODE 250: Performed by: HOSPITALIST

## 2017-02-20 PROCEDURE — 25000003 PHARM REV CODE 250: Performed by: NURSE PRACTITIONER

## 2017-02-20 PROCEDURE — 25000003 PHARM REV CODE 250: Performed by: STUDENT IN AN ORGANIZED HEALTH CARE EDUCATION/TRAINING PROGRAM

## 2017-02-20 PROCEDURE — 85025 COMPLETE CBC W/AUTO DIFF WBC: CPT

## 2017-02-20 PROCEDURE — 36415 COLL VENOUS BLD VENIPUNCTURE: CPT

## 2017-02-20 PROCEDURE — 83735 ASSAY OF MAGNESIUM: CPT

## 2017-02-20 PROCEDURE — 27000207 HC ISOLATION

## 2017-02-20 PROCEDURE — 20600001 HC STEP DOWN PRIVATE ROOM

## 2017-02-20 PROCEDURE — 85610 PROTHROMBIN TIME: CPT

## 2017-02-20 PROCEDURE — 90935 HEMODIALYSIS ONE EVALUATION: CPT | Mod: ,,, | Performed by: INTERNAL MEDICINE

## 2017-02-20 PROCEDURE — 25000003 PHARM REV CODE 250: Performed by: INTERNAL MEDICINE

## 2017-02-20 PROCEDURE — 97803 MED NUTRITION INDIV SUBSEQ: CPT

## 2017-02-20 PROCEDURE — 93005 ELECTROCARDIOGRAM TRACING: CPT

## 2017-02-20 PROCEDURE — 85730 THROMBOPLASTIN TIME PARTIAL: CPT | Mod: 91

## 2017-02-20 PROCEDURE — 80048 BASIC METABOLIC PNL TOTAL CA: CPT

## 2017-02-20 PROCEDURE — 85730 THROMBOPLASTIN TIME PARTIAL: CPT

## 2017-02-20 RX ORDER — CLONIDINE HYDROCHLORIDE 0.1 MG/1
0.1 TABLET ORAL 2 TIMES DAILY
Status: DISCONTINUED | OUTPATIENT
Start: 2017-02-20 | End: 2017-02-21 | Stop reason: HOSPADM

## 2017-02-20 RX ORDER — SEVELAMER CARBONATE 800 MG/1
800 TABLET, FILM COATED ORAL
Status: DISCONTINUED | OUTPATIENT
Start: 2017-02-20 | End: 2017-02-21 | Stop reason: HOSPADM

## 2017-02-20 RX ADMIN — LABETALOL HYDROCHLORIDE 10 MG: 5 INJECTION, SOLUTION INTRAVENOUS at 07:02

## 2017-02-20 RX ADMIN — OXYCODONE HYDROCHLORIDE AND ACETAMINOPHEN 500 MG: 500 TABLET ORAL at 10:02

## 2017-02-20 RX ADMIN — OXYCODONE HYDROCHLORIDE 10 MG: 5 TABLET ORAL at 10:02

## 2017-02-20 RX ADMIN — LABETALOL HCL 300 MG: 300 TABLET, FILM COATED ORAL at 10:02

## 2017-02-20 RX ADMIN — SEVELAMER CARBONATE 800 MG: 800 TABLET, FILM COATED ORAL at 07:02

## 2017-02-20 RX ADMIN — SILDENAFIL 20 MG: 20 TABLET ORAL at 10:02

## 2017-02-20 RX ADMIN — OXYCODONE HYDROCHLORIDE AND ACETAMINOPHEN 500 MG: 500 TABLET ORAL at 09:02

## 2017-02-20 RX ADMIN — DOCUSATE SODIUM 100 MG: 100 CAPSULE, LIQUID FILLED ORAL at 10:02

## 2017-02-20 RX ADMIN — HEPARIN SODIUM 1000 UNITS: 1000 INJECTION, SOLUTION INTRAVENOUS; SUBCUTANEOUS at 06:02

## 2017-02-20 RX ADMIN — HEPARIN SODIUM AND DEXTROSE 2000 UNITS/HR: 10000; 5 INJECTION INTRAVENOUS at 12:02

## 2017-02-20 RX ADMIN — OXYCODONE HYDROCHLORIDE 5 MG: 5 TABLET ORAL at 10:02

## 2017-02-20 RX ADMIN — LABETALOL HCL 300 MG: 300 TABLET, FILM COATED ORAL at 01:02

## 2017-02-20 RX ADMIN — CALCIUM CARBONATE (ANTACID) CHEW TAB 500 MG 500 MG: 500 CHEW TAB at 12:02

## 2017-02-20 RX ADMIN — AMLODIPINE BESYLATE 5 MG: 5 TABLET ORAL at 10:02

## 2017-02-20 RX ADMIN — DOCUSATE SODIUM 100 MG: 100 CAPSULE, LIQUID FILLED ORAL at 09:02

## 2017-02-20 RX ADMIN — ASPIRIN 81 MG: 81 TABLET, COATED ORAL at 10:02

## 2017-02-20 RX ADMIN — SILDENAFIL 20 MG: 20 TABLET ORAL at 06:02

## 2017-02-20 RX ADMIN — CLONIDINE HYDROCHLORIDE 0.1 MG: 0.1 TABLET ORAL at 09:02

## 2017-02-20 RX ADMIN — OXYCODONE HYDROCHLORIDE 10 MG: 5 TABLET ORAL at 05:02

## 2017-02-20 RX ADMIN — OXYCODONE HYDROCHLORIDE 10 MG: 5 TABLET ORAL at 12:02

## 2017-02-20 RX ADMIN — WARFARIN SODIUM 7.5 MG: 7.5 TABLET ORAL at 07:02

## 2017-02-20 RX ADMIN — CLONIDINE HYDROCHLORIDE 0.1 MG: 0.1 TABLET ORAL at 06:02

## 2017-02-20 RX ADMIN — SILDENAFIL 20 MG: 20 TABLET ORAL at 01:02

## 2017-02-20 RX ADMIN — HYDRALAZINE HYDROCHLORIDE 100 MG: 50 TABLET ORAL at 06:02

## 2017-02-20 RX ADMIN — SODIUM CHLORIDE: 0.9 INJECTION, SOLUTION INTRAVENOUS at 03:02

## 2017-02-20 RX ADMIN — LABETALOL HCL 300 MG: 300 TABLET, FILM COATED ORAL at 06:02

## 2017-02-20 RX ADMIN — LABETALOL HYDROCHLORIDE 10 MG: 5 INJECTION, SOLUTION INTRAVENOUS at 03:02

## 2017-02-20 RX ADMIN — HEPARIN SODIUM AND DEXTROSE 1900 UNITS/HR: 10000; 5 INJECTION INTRAVENOUS at 01:02

## 2017-02-20 NOTE — PROGRESS NOTES
Progress Note  Cardiothoracic Surgery    Admit Date: 2/2/2017  Post-operative Day: 12 Days Post-Op  Hospital Day: 19     SUBJECTIVE:  Patient without complaints.  SR per monitor.     Follow-up For: Procedure(s) (LRB):  REPLACEMENT-VALVE-AORTIC (N/A)  REDO STERNOTOMY WITH AORTIC VALVE REPLACEMENT/redo sternotomy (N/A)  REPAIR-VALVE-TRICUSPID    Scheduled Meds:   ascorbic acid (vitamin C)  500 mg Oral BID    aspirin  81 mg Oral Daily    cloNIDine  0.1 mg Oral BID    docusate sodium  100 mg Oral BID    epoetin vikki (PROCRIT) injection  10,000 Units Intravenous Every Tues, Thurs, Sat    ferric gluconate (FERRLECIT) IVPB  125 mg Intravenous Every Tues, Thurs, Sat    labetalol  300 mg Oral Q8H    polyethylene glycol  17 g Per G Tube BID    sildenafil  20 mg Oral TID    warfarin  7.5 mg Oral Daily     Infusions/Drips:   heparin (porcine) in 5 % dex 1,900 Units/hr (02/20/17 0807)     PRN Meds: sodium chloride, sodium chloride 0.9%, sodium chloride 0.9%, acetaminophen, albuterol-ipratropium 2.5mg-0.5mg/3mL, bisacodyl, calcium carbonate, dextrose 50%, glucagon (human recombinant), heparin (porcine), hydrALAZINE, insulin aspart, labetalol, lactulose, metoclopramide HCl, ondansetron, oxycodone, oxycodone    Review of patient's allergies indicates:  No Known Allergies    OBJECTIVE:     Vital Signs (Most Recent)  Temp: 98.1 °F (36.7 °C) (02/20/17 1138)  Pulse: 85 (02/20/17 1138)  Resp: 18 (02/20/17 1138)  BP: (!) 84/52 (02/20/17 1138)  SpO2: (!) 94 % (02/20/17 1138)    Admission Weight: 86.3 kg (190 lb 4.1 oz) (02/02/17 2035)   Most Recent Weight: 75 kg (165 lb 5.5 oz) (02/19/17 0500)    Vital Signs Range (Last 24H):  Temp:  [98.1 °F (36.7 °C)-98.5 °F (36.9 °C)]   Pulse:  [81-90]   Resp:  [16-18]   BP: ()/(52-66)   SpO2:  [91 %-96 %]     I & O (Last 24H):  Intake/Output Summary (Last 24 hours) at 02/20/17 1304  Last data filed at 02/20/17 1000   Gross per 24 hour   Intake           1964.9 ml   Output                 0 ml   Net           1964.9 ml     Physical Exam:  General: no distress  Head: normocephalic  Lungs:  normal respiratory effort  Heart: regular rate and rhythm  Abdomen: soft/nontender   Extremities: warm, well perfused  Skin: Skin color, texture, turgor normal. No rashes or lesions    Wound/Incision:  clean, dry, intact    Laboratory:  CBC:   Recent Labs  Lab 02/20/17 0033   WBC 10.86   RBC 2.55*   HGB 7.6*   HCT 23.2*      MCV 91   MCH 29.8   MCHC 32.8     BMP:   Recent Labs  Lab 02/20/17 0033      *   K 4.6      CO2 21*   BUN 41*   CREATININE 6.8*   CALCIUM 8.5*   MG 2.1       ASSESSMENT/PLAN:     Assessment:   Active Hospital Problems    Diagnosis    *S/P AVR (aortic valve replacement)    ESRD (end stage renal disease)    Hyperglycemia    Postprocedural hypotension    Nonrheumatic aortic valve stenosis    Adult congenital heart disease    Pulmonary HTN    CKD stage 5 secondary to hypertension    Uncontrolled hypertension    Hypertensive cardiomegaly with heart failure       Plan:   Sternal Precautions  PT/OT  Ambulate  S/p AVR: continue asa, lopressor and coumadin   Heparin gtt @ 1900 and coumadin 7.5 tonight, INR 1.3  ESRD: Pt tolerated HD and Nephro following.   D/c lasix since anuric   Will need to change dialysis schedule to MWF, discussed with nephrology  Pulmonary HTN: sildenafil  HTN: DC norvasc and hydralazine, continue labetalol.  Decrease dose of clonidine, MAPS in the 60's  Discharge planning ongoing

## 2017-02-20 NOTE — PROGRESS NOTES
Patient blood pressure 84/52 (62). MERVAT Murguia NP notified. NP stated that she will look into patient's medications and make changes as needed. Will continue to monitor.

## 2017-02-20 NOTE — PLAN OF CARE
CM spoke with pt and informed him that dialysis is set up and follow up appt. Also informed him gave his sister this information along with transportation information to apply. Informed him plan is to dc tomorrow if PT/INR therapeutic. Pt agree's with plan.

## 2017-02-20 NOTE — NURSING TRANSFER
Nursing Transfer Note      2/20/2017     Transfer to dialysis    Transfer via stretcher    Transfer with portable telemetry    Transported by escort    Medicines sent: heparin infusing

## 2017-02-20 NOTE — PROGRESS NOTES
Pt arrived via stretcher.  Placed on cardiac monitor and b/p check every 15 minutes.  Dialysis access prep with prevantic swab. Right subclavian   Hemodialysis started per orders.

## 2017-02-20 NOTE — PT/OT/SLP PROGRESS
Physical Therapy      Isidro Lozano Jr Sheriffbar  MRN: [id number]    Patient not seen today secondary to Dialysis. PT unable to return for second attempt. Will follow-up as staffing permits.    Yenni De León, PT, DPT   2/20/2017  [phone]

## 2017-02-20 NOTE — PROGRESS NOTES
Progress Note  Nephrology    Admit Date: 2/2/2017   LOS: 18 days     SUBJECTIVE:     Follow-up For:  ESRD on HD     Interval follow up:  KD. Had hypotensive episode and it improved without itervention. New EDW 78 kg. Seen in VIV while on HD. Tolerating his treatment well without complications. UO 0 ml/24hrs. Oxygenation improved over the weekend now on Room air.     OBJECTIVE:     Vital Signs (Most Recent)  Temp: 98.1 °F (36.7 °C) (02/20/17 1138)  Pulse: 85 (02/20/17 1500)  Resp: 18 (02/20/17 1138)  BP: 116/65 (02/20/17 1340)  SpO2: (!) 94 % (02/20/17 1138)    Vital Signs Range (Last 24H):  Temp:  [98.1 °F (36.7 °C)-98.5 °F (36.9 °C)]   Pulse:  [81-90]   Resp:  [16-18]   BP: ()/(52-66)   SpO2:  [91 %-96 %]       Intake/Output Summary (Last 24 hours) at 02/20/17 1607  Last data filed at 02/20/17 1400   Gross per 24 hour   Intake           1699.9 ml   Output                0 ml   Net           1699.9 ml     Physical Exam:  General: Well developed, well nourished NAD  HEENT: Conjunctiva clear; EOMs clear   Neck: No JVD noted, Supple  Chest : dry and clean dressing. Decreased BS with rhonchi bilaterally  CV- Normal S1, S2 with no murmurs,gallops,rubs  Resp- rales noted at bases improved, no wheezing    Unlabored  Abdomen- NTND, BS heard, soft  Extrem- No cyanosis, clubbing, edema.  Skin- No rashes, lesions, ulcers  Neuro: AO x4, no FND   Access: [facility]     Laboratory:  CBC:     Recent Labs  Lab 02/20/17 0033   WBC 10.86   RBC 2.55*   HGB 7.6*   HCT 23.2*      MCV 91   MCH 29.8   MCHC 32.8     CMP:     Recent Labs  Lab 02/20/17 0033      CALCIUM 8.5*   *   K 4.6   CO2 21*      BUN 41*   CREATININE 6.8*     ABGs:   No results for input(s): PH, PCO2, HCO3, POCSATURATED, BE in the last 168 hours.    Diagnostic Results:  Labs: Reviewed  X-Ray: Reviewed    ASSESSMENT/PLAN:     47 y.o. AAM with h/o ASD repair at 9 yo, aortic stenosis with CHF wirh rEF, pulmonary HTN, active hep B  transferred from [facility] for surgery eval for AVR.     Now s/p redo sternotomy with  AVR and Tricuspid valve repair (2/8/2017)     CKD 5 progressed to ESRD on IHD MWF     - improving O2 now on RA  - Seen while in HD tolerated well no complains  - Hemodynamically stable  - HD today for metabolic clearance and volume management x 3.5 hrs  - Target UF 1-2 lts  - Lytes and acid base stable   - He is anuric lasix stopped per primary team  - Monitor strict I/Os, daily weights  - EDW 78 kg    Aceess:RIJ-Perm cath    Active problem in hospital  - s/p AVR    Anemia of Chronic Kidney Disease   - Will Cont MARYBETH EPO [id number] uts with HD  - Fe depleted continue Ferlicit with HD    Mineral Bone Disease in CKD   - Cont on renal diet   - Phos at goal but start Renvela 800 mg AC and snacks  - RFP daily to monitor Phos    HTN   - Well control BP, will continue to monitor. Goal for BP is <130 mmHg SBP and BDP <80 mmHg.   - hypotensive medications adjusted per primary team    Case discuss with Staff further recs with attestation.    Abhijit Shah MD  Nephrology Fellow PGY4  [phone]

## 2017-02-20 NOTE — PROGRESS NOTES
Dr Campoverde notified that pt c/o heart palpitations that are making his body jump - telemetry noted sinus rhythm with ocassional PAC's and b/p=108/65 map=81 hr=87; -  Orders received to give Labetalol

## 2017-02-20 NOTE — PLAN OF CARE
Problem: Physical Therapy Goal  Goal: Physical Therapy Goal  Goals to be met by: 17    Patient will increase functional independence with mobility by performin. Supine to sit with Stand-by Assistance - met   2 . Sit to stand transfer with Supervision - not met  3. Gait x 220 feet with Supervision - met 2017            Goals remain appropriate.      Jacey Dong, PTA.  2017

## 2017-02-20 NOTE — PT/OT/SLP PROGRESS
"Physical Therapy  Treatment    Isidro Lozano Jr Uvalda   MRN: [id number]   Admitting Diagnosis: S/P AVR (aortic valve replacement)    PT Received On: 17  PT Start Time: 1426     PT Stop Time: 1447    PT Total Time (min): 21 min       Billable Minutes:  Gait [id number]    Treatment Type: Treatment  PT/PTA: PTA     PTA Visit Number: 3       General Precautions: Standard, fall, sternal  Orthopedic Precautions: N/A   Braces: N/A    Do you have any cultural, spiritual, Rastafarian conflicts, given your current situation?:  (none)    Subjective:  Communicated with RN (Barrie) prior to session.  Pt agreeable to PT session  "I'm tired, I need to take a nap"  Pt recalls sternal precautions    Pain Ratin/10   Pain Rating Post-Intervention: 0/10    Objective:   Patient found with: telemetry, peripheral IV (Pt found in R side lying with wedge under L side with HOB elevated and no family present)    Functional Mobility:  Bed Mobility:   Supine to Sit: Stand by Assistance (from R side lying using R UE to elevate trunk with vc's not to )  Sit to Supine: Supervision (sit > R side lying)    Transfers:  Sit <> Stand Assistance: Supervision (from EOB observing sternal precautions)  Sit <> Stand Assistive Device: No Assistive Device    Gait:   Gait Distance: 400ft with vc's for directional guidance 2* pt veering to the L side of hallway.  Pt performs 2 turns during gait trial demonstrating lateral instability with no LOB.  IV pole in tow  Assistance 1: Stand by Assistance, Supervision (initially with SBA then progressing to sup)  Gait Assistive Device: No device  Gait Pattern: swing-through gait  Gait Deviation(s): decreased lena, decreased step length, decreased stride length, decreased weight-shifting ability    Therapeutic Activities and Exercises:   Pt educated regarding "no pushing with UE's during functional mobility" (see bed mob)    AM-PAC 6 CLICK MOBILITY  How much help from another person does this patient currently " need?   1 = Unable, Total/Dependent Assistance  2 = A lot, Maximum/Moderate Assistance  3 = A little, Minimum/Contact Guard/Supervision  4 = None, Modified [place]/Independent    Turning over in bed (including adjusting bedclothes, sheets and blankets)?: 3  Sitting down on and standing up from a chair with arms (e.g., wheelchair, bedside commode, etc.): 3  Moving from lying on back to sitting on the side of the bed?: 3  Moving to and from a bed to a chair (including a wheelchair)?: 3  Need to walk in hospital room?: 3  Climbing 3-5 steps with a railing?: 3  Total Score: 18    AM-PAC Raw Score CMS G-Code Modifier Level of Impairment Assistance   6 % Total / Unable   7 - 9 CM 80 - 100% Maximal Assist   10 - 14 CL 60 - 80% Moderate Assist   15 - 19 CK 40 - 60% Moderate Assist   20 - 22 CJ 20 - 40% Minimal Assist   23 CI 1-20% SBA / CGA   24 CH 0% Independent/ Mod I     Patient left R side lying with HOB elevated with all lines intact, call button in reach and RN notified.    Assessment:  Isidro White is a 47 y.o. male with a medical diagnosis of S/P AVR (aortic valve replacement) and presents with lateral instability during gait, however, he does well maintaining balance even when turning.  Pt able to recall sternal precautions, however, still vc's to maintain during bed mob.  Pt will cont to benefit from skilled PT intervention to address deficits and improve functional mobility.     Rehab identified problem list/impairments: Rehab identified problem list/impairments: weakness, impaired functional mobilty, gait instability, decreased upper extremity function, decreased lower extremity function, decreased safety awareness    Rehab potential is good.    Activity tolerance: Good    Discharge recommendations: Discharge Facility/Level Of Care Needs: home (no further therapy needs)     Barriers to discharge: Barriers to Discharge: None    Equipment recommendations: Equipment Needed After Discharge: none      GOALS:   Physical Therapy Goals        Problem: Physical Therapy Goal    Goal Priority Disciplines Outcome Goal Variances Interventions   Physical Therapy Goal     PT/OT, PT Ongoing (interventions implemented as appropriate)     Description:  Goals to be met by: 17    Patient will increase functional independence with mobility by performin. Supine to sit with Stand-by Assistance - met   2 . Sit to stand transfer with Supervision - not met  3. Gait  x 220 feet with Supervision  -  met 2017                  PLAN:    Patient to be seen 6 x/week  to address the above listed problems via gait training, therapeutic activities, therapeutic exercises  Plan of Care expires: 17  Plan of Care reviewed with: patient         Jacey MESSINA Iker, PTA  2017

## 2017-02-20 NOTE — PLAN OF CARE
Problem: Patient Care Overview  Goal: Plan of Care Review  PHM: CHF, pulmonary HTN, CKD3, Hep B    PSH: ASD repair 9 y/o    Hospital Course:    2/8: Admit SICU Aortic valve replacement, Tricuspid valve repair, Redo sternotomy; 1L albumin, 2 amps bicarb, 2 unit PRBC  2/9: 2 PRBC, 1 amp bicarb, 1 500 5%Albumin, 1-100mL 25 %Albumin Levo/Milrinone off, wean Epi/Vaso, Heparin gtt started    Nursing:  * Accucheck Q6h  *Renal Function Panel q8h  *PTT q6h goal 60-80   Outcome: Ongoing (interventions implemented as appropriate)  Plan of care reviewed with patient/family; all questions and concerns addressed; no distress at present.

## 2017-02-20 NOTE — PLAN OF CARE
Problem: Patient Care Overview  Goal: Plan of Care Review  PHM: CHF, pulmonary HTN, CKD3, Hep B    PSH: ASD repair 7 y/o    Hospital Course:    2/8: Admit SICU Aortic valve replacement, Tricuspid valve repair, Redo sternotomy; 1L albumin, 2 amps bicarb, 2 unit PRBC  2/9: 2 PRBC, 1 amp bicarb, 1 500 5%Albumin, 1-100mL 25 %Albumin Levo/Milrinone off, wean Epi/Vaso, Heparin gtt started    Nursing:  * Accucheck Q6h  *Renal Function Panel q8h  *PTT q6h goal 60-80   Outcome: Ongoing (interventions implemented as appropriate)  Plan of care reviewed with patient. Patient has no complaints at this time. Patient remains on heparin drip until INR therapeutic. INR today is 1.3. Patient ambulatory independently in room. Patient to go for hemodialysis on today. Patient remains free of falls and injury.

## 2017-02-21 VITALS
DIASTOLIC BLOOD PRESSURE: 61 MMHG | HEART RATE: 100 BPM | HEIGHT: 74 IN | OXYGEN SATURATION: 96 % | BODY MASS INDEX: 21.23 KG/M2 | RESPIRATION RATE: 16 BRPM | SYSTOLIC BLOOD PRESSURE: 97 MMHG | WEIGHT: 165.38 LBS | TEMPERATURE: 99 F

## 2017-02-21 DIAGNOSIS — Z95.2 S/P AVR: Primary | ICD-10-CM

## 2017-02-21 LAB
ANION GAP SERPL CALC-SCNC: 9 MMOL/L
APTT BLDCRRT: 67.9 SEC
BASOPHILS # BLD AUTO: 0.02 K/UL
BASOPHILS NFR BLD: 0.2 %
BUN SERPL-MCNC: 32 MG/DL
CALCIUM SERPL-MCNC: 8.4 MG/DL
CHLORIDE SERPL-SCNC: 103 MMOL/L
CO2 SERPL-SCNC: 22 MMOL/L
CREAT SERPL-MCNC: 5.8 MG/DL
DIFFERENTIAL METHOD: ABNORMAL
EOSINOPHIL # BLD AUTO: 0.4 K/UL
EOSINOPHIL NFR BLD: 3.5 %
ERYTHROCYTE [DISTWIDTH] IN BLOOD BY AUTOMATED COUNT: 17.1 %
EST. GFR  (AFRICAN AMERICAN): 12.4 ML/MIN/1.73 M^2
EST. GFR  (NON AFRICAN AMERICAN): 10.7 ML/MIN/1.73 M^2
GLUCOSE SERPL-MCNC: 94 MG/DL
HCT VFR BLD AUTO: 23.8 %
HGB BLD-MCNC: 7.7 G/DL
INR PPP: 1.9
LYMPHOCYTES # BLD AUTO: 1.5 K/UL
LYMPHOCYTES NFR BLD: 13.7 %
MAGNESIUM SERPL-MCNC: 1.9 MG/DL
MCH RBC QN AUTO: 30.1 PG
MCHC RBC AUTO-ENTMCNC: 32.4 %
MCV RBC AUTO: 93 FL
MONOCYTES # BLD AUTO: 0.9 K/UL
MONOCYTES NFR BLD: 8.5 %
NEUTROPHILS # BLD AUTO: 7.9 K/UL
NEUTROPHILS NFR BLD: 73.8 %
PHOSPHATE SERPL-MCNC: 4.4 MG/DL
PLATELET # BLD AUTO: 334 K/UL
PMV BLD AUTO: 9.7 FL
POTASSIUM SERPL-SCNC: 4.3 MMOL/L
PROTHROMBIN TIME: 19.1 SEC
RBC # BLD AUTO: 2.56 M/UL
SODIUM SERPL-SCNC: 134 MMOL/L
WBC # BLD AUTO: 10.7 K/UL

## 2017-02-21 PROCEDURE — 97535 SELF CARE MNGMENT TRAINING: CPT

## 2017-02-21 PROCEDURE — 63600175 PHARM REV CODE 636 W HCPCS: Performed by: STUDENT IN AN ORGANIZED HEALTH CARE EDUCATION/TRAINING PROGRAM

## 2017-02-21 PROCEDURE — 25000003 PHARM REV CODE 250: Performed by: STUDENT IN AN ORGANIZED HEALTH CARE EDUCATION/TRAINING PROGRAM

## 2017-02-21 PROCEDURE — 25000003 PHARM REV CODE 250: Performed by: NURSE PRACTITIONER

## 2017-02-21 PROCEDURE — 85610 PROTHROMBIN TIME: CPT

## 2017-02-21 PROCEDURE — 85730 THROMBOPLASTIN TIME PARTIAL: CPT

## 2017-02-21 PROCEDURE — 36415 COLL VENOUS BLD VENIPUNCTURE: CPT

## 2017-02-21 PROCEDURE — 84100 ASSAY OF PHOSPHORUS: CPT

## 2017-02-21 PROCEDURE — 25000003 PHARM REV CODE 250: Performed by: HOSPITALIST

## 2017-02-21 PROCEDURE — 85025 COMPLETE CBC W/AUTO DIFF WBC: CPT

## 2017-02-21 PROCEDURE — 80048 BASIC METABOLIC PNL TOTAL CA: CPT

## 2017-02-21 PROCEDURE — 83735 ASSAY OF MAGNESIUM: CPT

## 2017-02-21 PROCEDURE — 25000003 PHARM REV CODE 250: Performed by: THORACIC SURGERY (CARDIOTHORACIC VASCULAR SURGERY)

## 2017-02-21 PROCEDURE — 25000003 PHARM REV CODE 250: Performed by: SURGERY

## 2017-02-21 RX ORDER — SILDENAFIL CITRATE 20 MG/1
20 TABLET ORAL 3 TIMES DAILY
Qty: 90 TABLET | Refills: 11 | Status: ON HOLD | OUTPATIENT
Start: 2017-02-21 | End: 2017-03-19 | Stop reason: HOSPADM

## 2017-02-21 RX ORDER — ASCORBIC ACID 500 MG
500 TABLET ORAL 2 TIMES DAILY
COMMUNITY
Start: 2017-02-21

## 2017-02-21 RX ORDER — SILDENAFIL CITRATE 20 MG/1
20 TABLET ORAL 3 TIMES DAILY
Qty: 90 TABLET | Refills: 11 | Status: SHIPPED | OUTPATIENT
Start: 2017-02-21 | End: 2017-02-21 | Stop reason: SDUPTHER

## 2017-02-21 RX ORDER — CLONIDINE HYDROCHLORIDE 0.1 MG/1
0.1 TABLET ORAL 2 TIMES DAILY
Qty: 60 TABLET | Refills: 11 | Status: ON HOLD | OUTPATIENT
Start: 2017-02-21 | End: 2017-03-19 | Stop reason: HOSPADM

## 2017-02-21 RX ORDER — WARFARIN 7.5 MG/1
7.5 TABLET ORAL DAILY
Qty: 30 TABLET | Refills: 11 | Status: SHIPPED | OUTPATIENT
Start: 2017-02-21 | End: 2019-07-25 | Stop reason: SDUPTHER

## 2017-02-21 RX ORDER — SEVELAMER CARBONATE 800 MG/1
800 TABLET, FILM COATED ORAL
Qty: 90 TABLET | Refills: 11 | Status: SHIPPED | OUTPATIENT
Start: 2017-02-21 | End: 2017-06-20

## 2017-02-21 RX ORDER — OXYCODONE HYDROCHLORIDE 5 MG/1
5 TABLET ORAL EVERY 4 HOURS PRN
Qty: 60 TABLET | Refills: 0 | Status: SHIPPED | OUTPATIENT
Start: 2017-02-21 | End: 2018-08-09

## 2017-02-21 RX ORDER — ASPIRIN 81 MG/1
81 TABLET ORAL DAILY
Refills: 0 | Status: ON HOLD | COMMUNITY
Start: 2017-02-21 | End: 2017-03-19 | Stop reason: HOSPADM

## 2017-02-21 RX ORDER — DOCUSATE SODIUM 100 MG/1
100 CAPSULE, LIQUID FILLED ORAL 2 TIMES DAILY
Refills: 0 | COMMUNITY
Start: 2017-02-21 | End: 2018-12-13

## 2017-02-21 RX ADMIN — HYDRALAZINE HYDROCHLORIDE 10 MG: 20 INJECTION INTRAMUSCULAR; INTRAVENOUS at 12:02

## 2017-02-21 RX ADMIN — HEPARIN SODIUM AND DEXTROSE 1700 UNITS/HR: 10000; 5 INJECTION INTRAVENOUS at 03:02

## 2017-02-21 RX ADMIN — LABETALOL HCL 300 MG: 300 TABLET, FILM COATED ORAL at 02:02

## 2017-02-21 RX ADMIN — OXYCODONE HYDROCHLORIDE AND ACETAMINOPHEN 500 MG: 500 TABLET ORAL at 09:02

## 2017-02-21 RX ADMIN — LABETALOL HCL 300 MG: 300 TABLET, FILM COATED ORAL at 05:02

## 2017-02-21 RX ADMIN — OXYCODONE HYDROCHLORIDE 10 MG: 5 TABLET ORAL at 06:02

## 2017-02-21 RX ADMIN — SEVELAMER CARBONATE 800 MG: 800 TABLET, FILM COATED ORAL at 02:02

## 2017-02-21 RX ADMIN — SEVELAMER CARBONATE 800 MG: 800 TABLET, FILM COATED ORAL at 09:02

## 2017-02-21 RX ADMIN — ASPIRIN 81 MG: 81 TABLET, COATED ORAL at 09:02

## 2017-02-21 RX ADMIN — DOCUSATE SODIUM 100 MG: 100 CAPSULE, LIQUID FILLED ORAL at 09:02

## 2017-02-21 RX ADMIN — SILDENAFIL 20 MG: 20 TABLET ORAL at 02:02

## 2017-02-21 RX ADMIN — CLONIDINE HYDROCHLORIDE 0.1 MG: 0.1 TABLET ORAL at 09:02

## 2017-02-21 RX ADMIN — CALCIUM CARBONATE (ANTACID) CHEW TAB 500 MG 500 MG: 500 CHEW TAB at 08:02

## 2017-02-21 RX ADMIN — SILDENAFIL 20 MG: 20 TABLET ORAL at 05:02

## 2017-02-21 NOTE — PT/OT/SLP PROGRESS
"Occupational Therapy  Treatment    Isidro White   MRN: [id number]   Admitting Diagnosis: S/P AVR (aortic valve replacement)    OT Date of Treatment: 17   OT Start Time: 905  OT Stop Time: 922  OT Total Time (min): 17 min    Billable Minutes:  Self Care/Home Management 17    General Precautions: Standard, fall, sternal (cardiac)  Orthopedic Precautions: N/A  Braces: N/A    Subjective:  Communicated with RN prior to session.  "I want to get out of bed and they told me I had to call."    Pain Ratin/10  Pain Rating Post-Intervention: 0/10    Objective:  Patient found with: telemetry, peripheral IV     Functional Mobility:  Bed Mobility:  Rolling/Turning Right: Stand by assistance (one VC to avoid breaking sternal prec's)  Scooting/Bridging: Supervision (towards EOB)  Supine to Sit: Stand by Assistance    Transfers:   Sit <> Stand Assistance: Stand By Assistance (from EOB x1 trial)  Sit <> Stand Assistive Device: No Assistive Device (arms across chest for splinting)  Bed <> Chair Technique: Other (see comments) (ambulated to chair)  Bed <> Chair Transfer Assistance: Stand By Assistance  Bed <> Chair Assistive Device: No Assistive Device (arms across chest for splinting)  Toilet Transfer Technique: Other (see comments) (ambulated to chair)  Toilet Transfer Assistance: Contact Guard Assistance  Toilet Transfer Assistive Device: No Assistive Device (arms across chest for splinting)    Functional Ambulation: Pt performed x~30 feet in room with no AD and SBA.  No LOB.     Activities of Daily Living:  Feeding Level of Assistance: Independent  UE Dressing Level of Assistance: Minimum assistance (gown mgmt; standing at sink)  LE Dressing Level of Assistance: Minimum assistance (B socks)  Grooming Position: Standing at sink  Grooming Level of Assistance: Supervision (oral hygiene; face washing; hair washing/combing; hand washing)  Toileting Where Assessed: Toilet  Toileting Level of Assistance: Stand by " assistance  Bathing Level of Assistance: Activity did not occur    Balance:   Static Sit: GOOD-: Takes MODERATE challenges from all directions but inconsistently  Dynamic Sit: GOOD-: Maintains balance through MODERATE excursions of active trunk movement,     Static Stand: FAIR+: Takes MINIMAL challenges from all directions  Dynamic stand: FAIR+: Needs CLOSE SUPERVISION during gait and is able to right self with minor LOB    Therapeutic Activities and Exercises:  Pt educated on OT role and POC.  Pt agreeable to decreased POC.  White board updated.  Pt agreeable to sit UIC and to continue to call RN for all transfers. Pt possibly set to D/C home this date and had no questions.     AM-PAC 6 CLICK ADL   How much help from another person does this patient currently need?   1 = Unable, Total/Dependent Assistance  2 = A lot, Maximum/Moderate Assistance  3 = A little, Minimum/Contact Guard/Supervision  4 = None, Modified [place]/Independent    Putting on and taking off regular lower body clothing? : 2  Bathing (including washing, rinsing, drying)?: 2  Toileting, which includes using toilet, bedpan, or urinal? : 3  Putting on and taking off regular upper body clothing?: 3  Taking care of personal grooming such as brushing teeth?: 4  Eating meals?: 4  Total Score: 18     AM-PAC Raw Score CMS G-Code Modifier Level of Impairment Assistance   6 % Total / Unable   7 - 9 CM 80 - 100% Maximal Assist   10 - 14 CL 60 - 80% Moderate Assist   15 - 19 CK 40 - 60% Moderate Assist   20 - 22 CJ 20 - 40% Minimal Assist   23 CI 1-20% SBA / CGA   24 CH 0% Independent/ Mod I     Patient left up in chair with all lines intact, call button in reach and RN notified    ASSESSMENT:  Isidro Lozano Jr White is a 47 y.o. male with a medical diagnosis of S/P AVR (aortic valve replacement) and presents with good motivation and participation with therapy services.  He continues to tolerated therapy well and remains open to all requested tasks.   Pt improving with (I) in regards to self-care and ADLs but remains sightly limited with higher level safety and endurance.  He remains appropriate for continued OT services to maximize quality of life and potential to remain home to age in place.     Rehab identified problem list/impairments: Rehab identified problem list/impairments: weakness, impaired endurance, impaired functional mobilty, gait instability, impaired balance, impaired self care skills, decreased safety awareness, impaired cardiopulmonary response to activity, decreased ROM    Rehab potential is good.    Activity tolerance: Good    Discharge recommendations: Discharge Facility/Level Of Care Needs: home     Barriers to discharge: Barriers to Discharge: None    Equipment recommendations: none     GOALS:   Occupational Therapy Goals        Problem: Occupational Therapy Goal    Goal Priority Disciplines Outcome Interventions   Occupational Therapy Goal     OT, PT/OT Ongoing (interventions implemented as appropriate)    Description:  Goals to be met by: goals due 2/18/17 ; revised x10 days and now due 2/28/17    Patient will increase functional independence with ADLs by performing:    UE Dressing with Set-up Assistance.  LE Dressing with Supervision.  Grooming while standing with Supervision. MET  Toileting from toilet with Supervision for hygiene and clothing management.   Stand pivot transfers with Supervision. MET  Toilet transfer to toilet with Supervision.                 Plan:  Patient to be seen 3 x/week to address the above listed problems via self-care/home management, therapeutic activities, therapeutic exercises  Plan of Care expires:    Plan of Care reviewed with: patient         Sunita LeosDOMINICK sAhraf  02/21/2017

## 2017-02-21 NOTE — PLAN OF CARE
Problem: Occupational Therapy Goal  Goal: Occupational Therapy Goal  Goals to be met by: goals due 2/18/17 ; revised x10 days and now due 2/28/17    Patient will increase functional independence with ADLs by performing:    UE Dressing with Set-up Assistance.  LE Dressing with Supervision.  Grooming while standing with Supervision. MET  Toileting from toilet with Supervision for hygiene and clothing management.   Stand pivot transfers with Supervision. MET  Toilet transfer to toilet with Supervision.   Outcome: Ongoing (interventions implemented as appropriate)  Multiple goals met this date.  POC revised as appropriate.   DOMINICK Dunn  2/21/2017

## 2017-02-21 NOTE — PLAN OF CARE
LEI called [facility] ([phone]) to inform them that the pt will dc today.  He will start there tomorrow and they are aware.    Yue Garibay, [facility] x [phone]

## 2017-02-21 NOTE — PROGRESS NOTES
Dialysis complete.  Blood returned.     Right subclavian CVC flushed * 2 with  Saline and heparin   Locked with cap and tape.  No s/s infection at cvc site.   Pt tolerated hemodialysis without diff.  Pt ran   2 Hrs and 45 minutes on hemodialysis machine.   Took off 1500  Ml net volume.      Used F-160 dialyzer.

## 2017-02-21 NOTE — PLAN OF CARE
CM called Maggie/sister [phone] to inform plan to dc pt today. She stated family will  today.

## 2017-02-21 NOTE — PT/OT/SLP DISCHARGE
Physical Therapy Discharge Summary    Isidro White  MRN: [id number]   S/P AVR (aortic valve replacement)   Patient Discharged from acute Physical Therapy on 17.  Please refer to prior PT noted date on 17 for functional status.     Assessment:   Goals partially met. Patient appropriate for care in another setting.  GOALS:   Physical Therapy Goals     Not on file      Multidisciplinary Problems (Resolved)        Problem: Physical Therapy Goal    Goal Priority Disciplines Outcome Goal Variances Interventions   Physical Therapy Goal   (Resolved)     PT/OT, PT Outcome(s) achieved     Description:  Goals to be met by: 17    Patient will increase functional independence with mobility by performin. Supine to sit with Stand-by Assistance - met   2 . Sit to stand transfer with Supervision - not met  3. Gait  x 220 feet with Supervision  -  met 2017                Reasons for Discontinuation of Therapy Services  Transfer to alternate level of care.      Plan:  Patient Discharged to: Home no PT services needed.    Yenni De León, PT, DPT   2017  [phone]

## 2017-02-21 NOTE — PROGRESS NOTES
[facility]  Adult Nutrition  Consult Note    SUMMARY     Recommendations    Recommendation/Intervention: 1. Changed OS from Boost Plus to Novasource Renal; enc'd pt to drink @ least 2/day while meal intake is inadequate. Pt verbalized understanding  2. Discussed Renal diet details but enc'd pt to see RD @ outpatient dialysis unit he goes to once discharged for ongoing monitoring of labs/diet restrictions he should follow   3. RD to monitor  Goals: Maintain meal intake >/=75%  Nutrition Goal Status: progressing towards goal       Continuum of Care Plan    Referral to Outpatient Services: other (see comments) (Nutr D/c Plan: d/c home on Renal diet)    Reason for Assessment    Reason for Assessment: RD follow-up  Diagnosis: cardiac disease, other (see comments) (s/p AVR/TVR 2/8/17)  Relevent Medical History: AS w/ CHF, pulm HTN, CKD stg 3, Hep B   Interdisciplinary Rounds: attended     General Information Comments: Pt seen this a.m. Neel'ing meals well w/ no n/v/c/d; does c/o decreased appetite though. Drinking OS; willing to have Novasource Renal ordered in place of Boost Plus as he tried it & liked it. Explained to pt it is higher in cals/prot & appropriate to HD pts. Pt to have HD today.    Nutrition Prescription Ordered    Current Diet Order: Renal  Nutrition Order Comments: 1200 ml FR        Evaluation of Received Nutrients/Fluid Intake           Energy Calories Required: not meeting needs                 Protein Required: not meeting needs        IV Fluid (mL):  (none noted)  Other Fluid (mL):  (IVPB meds)      Fluid Required: other (see comments) (per MD)  Comments: I/O noted, -6.44 L since admit; LBM 2/17        Nutrition Risk Screen     Nutrition Risk Screen: no indicators present    Nutrition/Diet History       Typical Food/Fluid Intake: adequate prior to NPO/sx per documentation  Food Preferences: Prefers sweet food currently & has taste aversio to savory foods; no Presybeterian/cultural prefs  reported        Factors Affecting Nutritional Intake: decreased appetite        Labs/Tests/Procedures/Meds       Pertinent Labs Reviewed: reviewed  Pertinent Labs Comments: Na 133, BUN 41, Crt 6.8, CO2 21  Pertinent Medications Reviewed: reviewed  Pertinent Medications Comments: Vit C, docusate, Fe, sevelamer, warfarin    Physical Findings    Overall Physical Appearance: loss of muscle mass, loss of subcutaneous fat  Tubes: other (see comments) (none)     Skin: other (see comments) (Ky 18; sx incisions)    Anthropometrics       Height (inches): 74.02 in  Weight Method: Bed Scale  Weight (kg): 75 kg  Ideal Body Weight (IBW), Male: 190.12 lb     % Ideal Body Weight, Male (lb): 86.97 lb     BMI (kg/m2): 21.22  BMI Grade: 18.5-24.9 - normal  Usual Body Weight (UBW), kg:  (unable to obtain)     % Weight Change:  (2/2 diuresis)     Estimated/Assessed Needs    Weight Used For Calorie Calculations: 75 kg (165 lb 5.5 oz)   Height (cm): 188 cm     Energy Need Method:  (x 1.25 (PAL) = 2128 cals daily)     RMR ([place]-St. Jeor Equation): 1702.93        Weight Used For Protein Calculations: 75 kg (165 lb 5.5 oz)  Protein Requirements:  gms (1.1-1.4 gms/kg)    Fluid Need Method: other (see comments) (per MD)         Monitor and Evaluation    Food and Nutrient Intake: energy intake, food and beverage intake  Food and Nutrient Adminstration: diet order     Physical Activity and Function: nutrition-related ADLs and IADLs  Anthropometric Measurements: weight, weight change  Biochemical Data, Medical Tests and Procedures: electrolyte and renal panel, glucose/endocrine profile, inflammatory profile  Nutrition-Focused Physical Findings: overall appearance    Nutrition Risk    Level of Risk: moderate    Nutrition Follow-Up    RD Follow-up?: Yes    Assessment and Plan    Nutrition dx/problem: Increased nutrient needs  Related to/etiology: physiological factors s/p cardiac sx  As evidenced by: EEN/EPN  Status: Continues

## 2017-02-21 NOTE — PROGRESS NOTES
Dr Bowser notified of map > 80 after one does of Labetalol - and that scheduled bp meds now due and instructed that if map continues > 80 at mn to give IV hydralazine.  Dr also notified of PTT = 101.9 order obtained

## 2017-02-21 NOTE — PLAN OF CARE
Problem: Patient Care Overview  Goal: Plan of Care Review  PHM: CHF, pulmonary HTN, CKD3, Hep B    PSH: ASD repair 7 y/o    Hospital Course:    2/8: Admit SICU Aortic valve replacement, Tricuspid valve repair, Redo sternotomy; 1L albumin, 2 amps bicarb, 2 unit PRBC  2/9: 2 PRBC, 1 amp bicarb, 1 500 5%Albumin, 1-100mL 25 %Albumin Levo/Milrinone off, wean Epi/Vaso, Heparin gtt started    Nursing:  * Accucheck Q6h  *Renal Function Panel q8h  *PTT q6h goal 60-80   Outcome: Outcome(s) achieved Date Met:  02/21/17  Patient is ready for discharge. Patient stable alert and oriented. IVs removed. No complaints of pain. Discussed discharge plan. Reviewed medications and side effects, appointments, and answered questions with patient and family. 1 paper script given to patient, 4 sent to pt's pharmacy. Pt escorted out in wheelchair by transport staff.

## 2017-02-22 NOTE — PLAN OF CARE
"CM faxed H&P, op note, dc summary, and labs to [facility]. They have received pt information.     Your fax has been successfully sent to [phone] at [phone].  ------------------------------------------------------------  From: facosta  ------------------------------------------------------------  Time: 2/22/2017 1:06:28 PM  Sent to [phone] with remote ID "[id number]                   "  Result: (0/339;0/0) Successful Send  Page record: 1 - 18  Elapsed time: 09:01 on channel 27         02/22/17 6143   Final Note   Assessment Type Final Discharge Note   Discharge Disposition Home   Discharge planning education complete? Yes   Hospital Follow Up  Appt(s) scheduled? Yes   Discharge plans and expectations educations in teach back method with documentation complete? Yes   Offered [facility] -- Bedside Delivery? n/a   Discharge/Hospital Encounter Summary to (non-[facility]) PCP Yes   Referral to Outpatient Case Management complete? No   Referral to / orders for Home Health Complete? No   30 day supply of medicines given at discharge, if documented non-compliance / non-adherence? No   Any social issues identified prior to discharge? Yes   Did you assess the readiness or willingness of the family or caregiver to support self management of care? Yes   Right Care Referral Info   Post Acute Recommendation Home-care     "

## 2017-02-22 NOTE — DISCHARGE SUMMARY
DATE OF ADMISSION:  02/02/2017.    DATE OF DISCHARGE:  02/21/2017.    DIAGNOSES:  1.  Moderate-to-severe aortic stenosis.  2.  Moderate-to-severe aortic insufficiency.  3.  Moderate tricuspid regurgitation.  4.  End-stage renal disease.  5.  Pulmonary hypertension.  6.  Hypertension.  7.  Previous cardiac surgery as a child/unknown type.    On 02/08/2017, the patient underwent aortic valve replacement with a 22 mm   Medtronic ATS open pivot mechanical valve, tricuspid valve repair with a 28 mm   Medtronic contour annuloplasty ring and redo sternotomy.    HISTORY OF PRESENT ILLNESS:  This is a 47-year-old male who underwent congenital   cardiac surgery at age 8.  He is unaware exactly of the nature of that   operation.  Unfortunately, he developed Class IV heart failure with preserved   ejection fraction.  He was admitted to [facility] where he was started   on dialysis.  Out of concern for his questionable right ventricular function,   he was transferred to [facility] for further management.  He presented for aortic   valve replacement and tricuspid valve repair.    HOSPITAL COURSE:  On 02/08/2017, the patient was taken to the Operating Room for   the above stated procedure.  Please see the previously dictated operative   report for complete details.  Postoperatively, the patient was taken to the ICU   from the Operating Room where vital signs were monitored and pain was kept under   control.  Nephrology was consulted, so the patient could continue dialysis via   chronic renal replacement therapy.  The patient's hemodynamic status was tenuous   in the first 24-48 hours.  Once he was hemodynamically stable, he was   extubated.  We were ultimately able to wean him from his drips as well as his   inhaled nitric oxide.  Once he was felt to be hemodynamically stable, he was   transferred to the Cardiac Step-Down floor for continued strengthening and   ambulation.  He was a bit debilitated postoperatively requiring  aggressive   physical therapy as well as occupational therapy support.  He was started on   anticoagulation appropriately for his mechanical valve.  Once his INR was   therapeutic, he was discharged to home.  This occurred on postoperative day 13.    He was ultimately transitioned to hemodialysis.  Our  and social   worker found him in outpatient dialysis unit to dialyze on Monday, Wednesday,   Friday.  He will follow up on Thursday, 02/23/2017 [facility] [place], [place].  At this time, he will   be assigned a primary care physician.  His PT/INR goal is 2-3.  This was   discussed in detail with the patient and his family.  His chest tubes were all   removed at which time they had drained a minimal amount.  He remained in normal   sinus rhythm.    MOBILITY AND ACTIVITY:  As tolerated.  He may shower.  No heavy lifting of   greater than 5 pounds and no driving.    DIET:  Cardiac prudent with a 2-liter fluid restriction.    WOUND CARE INSTRUCTIONS:  Check for redness, swelling, drainage around the   incision or wound.  He is to call for any obvious bleeding, drainage, pus from   the wound, unusual problems or difficulties, or a temperature of greater than   101 degrees.    FOLLOWUP:  Follow up with Dr. Gastelum in 3 weeks.  Prior to this appointment, he   will have a chest x-ray and EKG.     The patient was not placed on an ACE inhibitor due to concern for hypotension.    DISCHARGE MEDICATIONS:  Vitamin C 500 daily, aspirin 81 mg daily, Catapres 0.1   mg b.i.d., docusate sodium 100 b.i.d., oxycodone 5 mg every 4 hours as needed   for pain, sevelamer carbonate 800 mg t.i.d. Revatio 20 mg t.i.d., Coumadin 7.5   mg alternating with 5 mg daily, hydralazine 100 t.i.d., labetalol 300 every 12   hours and fish oil 1000 mg daily.    The patient was not placed on a statin due to no evidence of coronary artery   disease.    At the time of discharge, the  patient was in normal sinus rhythm, ambulating   unassisted, with stable vital signs.    DICTATED BY:  Mary Murguia NP.      JUSTYN  dd: 02/21/2017 15:33:25 (CST)  td: 02/22/2017 08:10:08 (CST)  Doc ID   #[id number]  Job ID #[id number]    CC:

## 2017-02-23 ENCOUNTER — PATIENT OUTREACH (OUTPATIENT)
Dept: ADMINISTRATIVE | Facility: CLINIC | Age: 47
End: 2017-02-23
Payer: MEDICAID

## 2017-02-23 ENCOUNTER — NURSE TRIAGE (OUTPATIENT)
Dept: ADMINISTRATIVE | Facility: CLINIC | Age: 47
End: 2017-02-23

## 2017-02-23 RX ORDER — HYDRALAZINE HYDROCHLORIDE 100 MG/1
TABLET, FILM COATED ORAL
Qty: 90 TABLET | Refills: 1 | Status: SHIPPED | OUTPATIENT
Start: 2017-02-23 | End: 2019-01-08 | Stop reason: DRUGHIGH

## 2017-02-23 RX ORDER — LABETALOL 300 MG/1
300 TABLET, FILM COATED ORAL EVERY 12 HOURS
Qty: 60 TABLET | Refills: 1 | Status: ON HOLD | OUTPATIENT
Start: 2017-02-23 | End: 2017-03-19 | Stop reason: HOSPADM

## 2017-02-23 NOTE — TELEPHONE ENCOUNTER
C3 nurse attempted to contact patient. No answer. The following message was left for the patient to return the call:  Good morning I am a nurse calling on behalf of [facility] from the [facility].  This is a Transitional Care Call for Isidro White . When you have a moment please contact us at [phone] or [phone] Monday through Friday, between the hours of 8 am to 4 pm. We look forward to speaking with you. On behalf of [facility] have a nice day.    The patient does not have a scheduled HOSFU appointment within 7-14 days post hospital discharge date 2/21/17.Unable to Message Physician staff to assist with HOSFU appointment scheduling.Patient do not have a PCP at this time.

## 2017-02-23 NOTE — PATIENT INSTRUCTIONS
Established High Blood Pressure    High blood pressure (hypertension) is a chronic disease. Often health care providers dont know what causes it. But it can be caused by certain health conditions and medicines.  If you have high blood pressure, you may not have any symptoms. If you do have symptoms, they may include headache, dizziness, changes in your vision, chest pain, and shortness of breath. But even without symptoms, high blood pressure thats not treated raises your risk for heart attack and stroke. High blood pressure is a serious health risk and shouldnt be ignored.  A blood pressure reading is made up of two numbers: a higher number over a lower number. The top number is the systolic pressure. The bottom number is the diastolic pressure. A normal blood pressure is less than 120 over less than 80.  High blood pressure is when either the top number is 140 or higher, or the bottom number is 90 or higher. This must be the result when taking your blood pressure a number of times. The blood pressures between normal and high are called prehypertension.  Home care  If you have high blood pressure, you should do what is listed below to lower your blood pressure. If you are taking medicines for high blood pressure, these methods may reduce or end your need for medicines in the future.  · Begin a weight-loss program if you are overweight.  · Cut back on how much salt you get in your diet. Heres how to do this:  ¨ Dont eat foods that have a lot of salt. These include olives, pickles, smoked meats, and salted potato chips.  ¨ Dont add salt to your food at the table.  ¨ Use only small amounts of salt when cooking.  · Begin an exercise program. Talk with your health care provider about the type of exercise program that would be best for you. It doesn't have to be hard. Even brisk walking for 20 minutes 3 times a week is a good form of exercise.  · Dont take medicines that have heart stimulants. This includes many  cold and sinus decongestant pills and sprays, as well as diet pills. Check the warnings about hypertension on the label. Stimulants such as amphetamine or cocaine could be lethal for someone with high blood pressure. Never take these.  · Limit how much caffeine you get in your diet. Switch to caffeine-free products.  · Stop smoking. If you are a long-time smoker, this can be hard. Enroll in a stop-smoking program to make it more likely that you will quit for good.  · Learn how to handle stress. This is an important part of any program to lower blood pressure. Learn about relaxation methods like meditation, yoga, or biofeedback.  · If your provider prescribed medicines, take them exactly as directed. Missing doses may cause your blood pressure get out of control.  · Consider buying an automatic blood pressure machine. You can get one of these at most pharmacies. Use this to watch your blood pressure at home. Give the results to your provider.  Follow-up care  You will need to make regular visits to your health care provider. This is to check your blood pressure and to make changes to your medicines. Make a follow-up appointment as directed.  When to seek medical advice  Call your health care provider right away if any of these occur:  · Chest pain or shortness of breath  · Severe headache  · Throbbing or rushing sound in the ears  · Nosebleed  · Sudden severe pain in your belly (abdomen)  · Extreme drowsiness, confusion, or fainting  · Dizziness or dizziness with a spinning sensation (vertigo)  · Weakness of an arm or leg or one side of the face  · You have problems speaking or seeing   Date Last Reviewed: 11/25/2014  © 6339-8419 [employer]. [address] [place] [address], [place] [address]. All rights reserved. This information is not intended as a substitute for professional medical care. Always follow your healthcare professional's instructions.

## 2017-02-24 NOTE — TELEPHONE ENCOUNTER
"  Reason for Disposition   [1] Request for URGENT new prescription or refill of "essential" medication (i.e., likelihood of harm to patient if not taken) AND [2] triager unable to fill per unit policy    Answer Assessment - Initial Assessment Questions  Pt;s brother calling -  Pt discharged 2/21/17. His discharge meds were to have been sent to [facility] pharmacy. He is supposed to be on labetalol and hydralazine but the pharmacy didn't receive this order.    Protocols used: ST MEDICATION QUESTION CALL-A-    I contacted a surgical resident, Tonja Proctor  and advised of above. She will erx orders for meds. Pt's brother was advised.   "

## 2017-02-26 ENCOUNTER — DOCUMENTATION ONLY (OUTPATIENT)
Dept: NEPHROLOGY | Facility: CLINIC | Age: 47
End: 2017-02-26

## 2017-02-26 NOTE — PROGRESS NOTES
: 1970    DIALYSIS HISTORY AND PHYSICAL    HISTORY OF PRESENT ILLNESS:  Mr. White is a 47-year-old -American male   who is a hemodialysis patient at the [facility].  His first   date of hemodialysis was 2017.  His cause of end-stage renal disease is   hypertension.  His current dialysis prescription is as follows:  Dialyzer:    Optiflux 180 non-reuse.  Dialysate:  Calcium 2.5, potassium 2.0.  Duration of   treatment is 240 minutes three times a week, on Monday, Wednesday and Friday.    Blood flow rate 400 mL/min and Dialysate flow rate 600 mL/min.  Current   hemodialysis access is a right IJ hemodialysis catheter.  Established dry weight   99.0 kg.  The patient is new to the dialysis unit, thus dry   weight is to be established.    MEDICAL PROBLEMS:  1. End-stage renal disease.  2. Anemia of chronic disease.  3. Hyperparathyroidism secondary to renal origin.  4. Hypertension.  5. AS with CHF.  6. Pulmonary hypertension.  7. Active hepatitis B.  8. SVT  9. Angina.    PAST SURGICAL HISTORY:  1.   ASD repair at 8 years old.  2.   Right IJ vas cath.  3.   AVR.    SOCIAL HISTORY:  He is single. He lives with his brother.  He does not smoke.  He does not   drink.  He denies IV or recreational drug use.  He worked as an .    He is on disability.    FAMILY HISTORY:  His mother is  from leukemia.  His father is    from a heart attack.  He does not know of any kidney disease in his family.    LABORATORY DATA:  Reviewed.    CURRENT MEDICATIONS:  Reviewed.    ALLERGIES:  No known allergies.    MEDICATIONS WITH HEMODIALYSIS TREATMENT:  Regulated per protocol.    HEALTH PROMOTION:  His cardiologist is in [place].    TRANSPLANT STATUS:  He is not on the transplant list.    REVIEW OF SYSTEMS:  GENERAL:  Awake, alert and oriented to person, place, time and situation.  No   complaints of chills, fatigue, fever, night sweats, weakness or weight loss.  ENT:   No complaints of ear pain, nasal congestion or postnasal drip.  He is not   hard of hearing.  EYES:  He does not wear glasses.  CARDIOVASCULAR:  No complaints of chest pain or palpitations.  RESPIRATORY:  No complaints of cough, shortness of breath or wheezing.  GASTROINTESTINAL:  No complaints of abdominal pain, constipation or diarrhea.    No complaints of nausea or vomiting.  States appetite is good.  No problems with   swallowing or chewing.  MUSCULOSKELETAL:  Ambulates without any assistive devices.  Moves all   extremities without difficulty.  GENITOURINARY:  He does urinate a small amount.  No complaints of nocturia or   dysuria.  SKIN:  No complaints of itching, rashes or sores.  NEUROLOGICAL:  No complaints of dizziness, headache, numbness or tingling.  PSYCHOLOGICAL:  Answers questions appropriately.  Speech is clear.  Affect is   appropriate.  HEMATOLOGICAL:  No bruising noted.  Balance of review of systems is negative.    PHYSICAL EXAMINATION:  VITAL SIGNS:  Blood pressure pre-dialysis 135/90 standing, 150/92 sitting, pulse   88, respirations 18, temperature 97.8, weight predialysis 80.1 kg.  Established   dry weight to be determined.  HEAD, EARS EYES, NOSE AND THROAT:  Pupils are equal and round.  Extraocular eye   movements intact.  Sclerae white.  No periorbital edema noted.  Conjunctivae   pink and moist.  Mucous membranes of the mouth pink and moist.  NECK:  Supple, no jugular vein distension, no carotid bruits auscultated. Right IJ Hd catheter in place.  CARDIOVASCULAR:  Regular rate and rhythm.  No murmurs, no gallops, no rubs   noted.  LUNGS:  Clear to auscultation anterior and posterior bilaterally.  ABDOMEN:  Soft.  Positive bowel sounds.  No complaints or tenderness upon   palpation.  LOWER EXTREMITIES:  Trace edema noted.  UPPER EXTREMITIES:  Hand  strong and equal.  Radial pulses +2.   SKIN:  Warm and dry.    ASSESSMENT AND PLAN:  1. End-stage renal disease.  We will continue  hemodialysis treatments three   times a week, 240 minutes each treatment, maintaining a URR of 70% or greater   and a Kt/V of 1.20.  The patient is new to the dialysis unit thus awaiting adequacy.   2. Anemia of chronic disease.  We will check hemoglobin at least monthly, target   range 10 to 11; transferrin saturation monthly, ferritin quarterly.  We will   dose Epogen and iron according to monthly blood work and according to protocol.    Awaiting blood work.  3. Hyperparathyroidism secondary to renal origin.  We will check intact PTH on a   quarterly basis.  We will dose Hectorol according to blood work.  Awaiting   blood work.  We will also check calcium and phosphorus monthly.    Awaiting blood work.  4. Hypertension, currently controlled with current medications, sodium and fluid   restrictions and dialysis prescription.        /melinda [id number] blank(s)        BM/PN  dd: 02/28/2017 17:49:51 (CST)  td: 03/01/2017 01:22:31 (CST)  Doc ID   #[id number]  Job ID #[id number]    CC: [facility]

## 2017-03-04 ENCOUNTER — NURSE TRIAGE (OUTPATIENT)
Dept: ADMINISTRATIVE | Facility: CLINIC | Age: 47
End: 2017-03-04

## 2017-03-04 NOTE — TELEPHONE ENCOUNTER
Reason for Disposition   [1] Bleeding present > 30 minutes AND [2] using correct method of direct pressure    Protocols used: ST NOSEBLEED-A-    Patient has a nosebleed he has not been able to stop for 4 hours and his aunt called to notify the MD that he is in the ED. Also, patient's gums started bleeding this morning. Informed them that Dr. Gastelum will be notified.

## 2017-03-05 NOTE — TELEPHONE ENCOUNTER
Pt in ER now with epistaxis for 2-3 hours. Checked coumadin - INR 5. Wants to know if what the MD is doing is appropriate. rec to follow MD instructions. Sent message to MD office for visit on Monday. Call back with questions.     Reason for Disposition   Requesting regular office appointment    Protocols used: ST INFORMATION ONLY CALL-A-AH

## 2017-03-07 ENCOUNTER — NURSE TRIAGE (OUTPATIENT)
Dept: ADMINISTRATIVE | Facility: CLINIC | Age: 47
End: 2017-03-07

## 2017-03-07 NOTE — TELEPHONE ENCOUNTER
"  Reason for Disposition   Difficulty breathing    Answer Assessment - Initial Assessment Questions  1. DESCRIPTION: "Please describe your heart rate or heart beat that you are having" (e.g., fast/slow, regular/irregular, skipped or extra beats, "palpitations")      Beating fast  2. ONSET: "When did it start?" (Minutes, hours or days)       An hour ago  3. DURATION: "How long does it last" (e.g., seconds, minutes, hours)      Still present  4. PATTERN "Does it come and go, or has it been constant since it started?"  "Does it get worse with exertion?"   "Are you feeling it now?"      constant  5. TAP: "Using your hand, can you tap out what you are feeling on a chair or table in front of you, so that I can hear?" (Note: not all patients can do this)        Not sure  6. HEART RATE: "Can you tell me your heart rate?" "How many beats in 15 seconds?"  (Note: not all patients can do this)        Not sure  7. RECURRENT SYMPTOM: "Have you ever had this before?" If so, ask: "When was the last time?" and "What happened that time?"       Yes while in hospital  8. CAUSE: "What do you think is causing the palpitations?"      Not sure  9. CARDIAC HISTORY: "Do you have any history of heart disease?" (e.g., heart attack, angina, bypass surgery, angioplasty, arrhythmia)         10. OTHER SYMPTOMS: "Do you have any other symptoms?" (e.g., dizziness, chest pain, sweating, difficulty breathing)      Shortness of breath  11. PREGNANCY: "Is there any chance you are pregnant?" "When was your last menstrual period?"    Protocols used:  HEART RATE AND HEART BEAT [id number]  Caller inpatient with answering questions at this time. Advised to ED at this time.  "

## 2017-03-09 ENCOUNTER — HOSPITAL ENCOUNTER (INPATIENT)
Facility: HOSPITAL | Age: 47
LOS: 10 days | Discharge: HOME | DRG: 273 | End: 2017-03-19
Attending: EMERGENCY MEDICINE | Admitting: HOSPITALIST
Payer: MEDICAID

## 2017-03-09 DIAGNOSIS — I35.0 NONRHEUMATIC AORTIC VALVE STENOSIS: ICD-10-CM

## 2017-03-09 DIAGNOSIS — J90 PLEURAL EFFUSION: ICD-10-CM

## 2017-03-09 DIAGNOSIS — D64.9 ANEMIA, UNSPECIFIED TYPE: ICD-10-CM

## 2017-03-09 DIAGNOSIS — I12.0 CKD STAGE 5 SECONDARY TO HYPERTENSION: ICD-10-CM

## 2017-03-09 DIAGNOSIS — N18.6 ESRD (END STAGE RENAL DISEASE): ICD-10-CM

## 2017-03-09 DIAGNOSIS — I10 UNCONTROLLED HYPERTENSION: ICD-10-CM

## 2017-03-09 DIAGNOSIS — I47.10 SVT (SUPRAVENTRICULAR TACHYCARDIA): ICD-10-CM

## 2017-03-09 DIAGNOSIS — D63.8 ANEMIA OF CHRONIC DISEASE: ICD-10-CM

## 2017-03-09 DIAGNOSIS — E86.9 VOLUME DEPLETION: ICD-10-CM

## 2017-03-09 DIAGNOSIS — N18.5 CKD STAGE 5 SECONDARY TO HYPERTENSION: ICD-10-CM

## 2017-03-09 DIAGNOSIS — Z95.2 S/P AVR (AORTIC VALVE REPLACEMENT): ICD-10-CM

## 2017-03-09 DIAGNOSIS — E43 SEVERE PROTEIN-CALORIE MALNUTRITION: ICD-10-CM

## 2017-03-09 DIAGNOSIS — Q24.9 ADULT CONGENITAL HEART DISEASE: ICD-10-CM

## 2017-03-09 DIAGNOSIS — I50.41 ACUTE COMBINED SYSTOLIC AND DIASTOLIC HEART FAILURE: Primary | ICD-10-CM

## 2017-03-09 DIAGNOSIS — I42.8 NONISCHEMIC CARDIOMYOPATHY: ICD-10-CM

## 2017-03-09 DIAGNOSIS — I27.20 PULMONARY HTN: ICD-10-CM

## 2017-03-09 PROBLEM — E88.09 SERUM ALBUMIN DECREASED: Status: ACTIVE | Noted: 2017-03-09

## 2017-03-09 LAB
ABO + RH BLD: NORMAL
ALBUMIN SERPL BCP-MCNC: 2 G/DL
ALP SERPL-CCNC: 100 U/L
ALT SERPL W/O P-5'-P-CCNC: 23 U/L
ANION GAP SERPL CALC-SCNC: 9 MMOL/L
APTT BLDCRRT: 29.1 SEC
AST SERPL-CCNC: 16 U/L
BACTERIA #/AREA URNS AUTO: NORMAL /HPF
BASOPHILS # BLD AUTO: 0.02 K/UL
BASOPHILS NFR BLD: 0.3 %
BILIRUB SERPL-MCNC: 0.6 MG/DL
BILIRUB UR QL STRIP: NEGATIVE
BLD GP AB SCN CELLS X3 SERPL QL: NORMAL
BLD PROD TYP BPU: NORMAL
BLD PROD TYP BPU: NORMAL
BLOOD UNIT EXPIRATION DATE: NORMAL
BLOOD UNIT EXPIRATION DATE: NORMAL
BLOOD UNIT TYPE CODE: 5100
BLOOD UNIT TYPE CODE: 5100
BLOOD UNIT TYPE: NORMAL
BLOOD UNIT TYPE: NORMAL
BNP SERPL-MCNC: 1292 PG/ML
BUN SERPL-MCNC: 28 MG/DL
CALCIUM SERPL-MCNC: 8.6 MG/DL
CHLORIDE SERPL-SCNC: 98 MMOL/L
CLARITY UR REFRACT.AUTO: CLEAR
CO2 SERPL-SCNC: 29 MMOL/L
CODING SYSTEM: NORMAL
CODING SYSTEM: NORMAL
COLOR UR AUTO: YELLOW
CREAT SERPL-MCNC: 4.5 MG/DL
CRP SERPL-MCNC: 78 MG/L
DIFFERENTIAL METHOD: ABNORMAL
DISPENSE STATUS: NORMAL
DISPENSE STATUS: NORMAL
EOSINOPHIL # BLD AUTO: 0.5 K/UL
EOSINOPHIL NFR BLD: 5.8 %
ERYTHROCYTE [DISTWIDTH] IN BLOOD BY AUTOMATED COUNT: 17.8 %
EST. GFR  (AFRICAN AMERICAN): 16.9 ML/MIN/1.73 M^2
EST. GFR  (NON AFRICAN AMERICAN): 14.6 ML/MIN/1.73 M^2
FACT X PPP CHRO-ACNC: 0.12 IU/ML
FOLATE SERPL-MCNC: 10.8 NG/ML
GLUCOSE SERPL-MCNC: 93 MG/DL
GLUCOSE UR QL STRIP: NEGATIVE
GRAM STN SPEC: NORMAL
GRAM STN SPEC: NORMAL
HAPTOGLOB SERPL-MCNC: 164 MG/DL
HCT VFR BLD AUTO: 20.9 %
HGB BLD-MCNC: 6.6 G/DL
HGB UR QL STRIP: NEGATIVE
HYALINE CASTS UR QL AUTO: 0 /LPF
INR PPP: 1.7
IRON SERPL-MCNC: 29 UG/DL
KETONES UR QL STRIP: NEGATIVE
LDH SERPL L TO P-CCNC: 249 U/L
LEUKOCYTE ESTERASE UR QL STRIP: NEGATIVE
LYMPHOCYTES # BLD AUTO: 1.5 K/UL
LYMPHOCYTES NFR BLD: 18.8 %
MCH RBC QN AUTO: 29.9 PG
MCHC RBC AUTO-ENTMCNC: 31.6 %
MCV RBC AUTO: 95 FL
MICROSCOPIC COMMENT: NORMAL
MONOCYTES # BLD AUTO: 0.8 K/UL
MONOCYTES NFR BLD: 10 %
NEUTROPHILS # BLD AUTO: 5 K/UL
NEUTROPHILS NFR BLD: 65 %
NITRITE UR QL STRIP: NEGATIVE
PH UR STRIP: 8 [PH] (ref 5–8)
PLATELET # BLD AUTO: 237 K/UL
PMV BLD AUTO: 9.6 FL
POTASSIUM SERPL-SCNC: 3.5 MMOL/L
PREALB SERPL-MCNC: 13 MG/DL
PROT SERPL-MCNC: 7.7 G/DL
PROT UR QL STRIP: ABNORMAL
PROTHROMBIN TIME: 17 SEC
RBC # BLD AUTO: 2.21 M/UL
RBC #/AREA URNS AUTO: 0 /HPF (ref 0–4)
SATURATED IRON: 16 %
SODIUM SERPL-SCNC: 136 MMOL/L
SP GR UR STRIP: 1.01 (ref 1–1.03)
TOTAL IRON BINDING CAPACITY: 181 UG/DL
TRANS ERYTHROCYTES VOL PATIENT: NORMAL ML
TRANS ERYTHROCYTES VOL PATIENT: NORMAL ML
TRANSFERRIN SERPL-MCNC: 117 MG/DL
TRANSFERRIN SERPL-MCNC: 122 MG/DL
TROPONIN I SERPL DL<=0.01 NG/ML-MCNC: 0.12 NG/ML
TROPONIN I SERPL DL<=0.01 NG/ML-MCNC: 0.14 NG/ML
URN SPEC COLLECT METH UR: ABNORMAL
UROBILINOGEN UR STRIP-ACNC: NEGATIVE EU/DL
WBC # BLD AUTO: 7.73 K/UL
WBC #/AREA URNS AUTO: 5 /HPF (ref 0–5)

## 2017-03-09 PROCEDURE — 83921 ORGANIC ACID SINGLE QUANT: CPT

## 2017-03-09 PROCEDURE — 96365 THER/PROPH/DIAG IV INF INIT: CPT

## 2017-03-09 PROCEDURE — 83540 ASSAY OF IRON: CPT

## 2017-03-09 PROCEDURE — 83615 LACTATE (LD) (LDH) ENZYME: CPT

## 2017-03-09 PROCEDURE — 82746 ASSAY OF FOLIC ACID SERUM: CPT

## 2017-03-09 PROCEDURE — 36415 COLL VENOUS BLD VENIPUNCTURE: CPT

## 2017-03-09 PROCEDURE — 99233 SBSQ HOSP IP/OBS HIGH 50: CPT | Mod: ,,, | Performed by: INTERNAL MEDICINE

## 2017-03-09 PROCEDURE — 85025 COMPLETE CBC W/AUTO DIFF WBC: CPT

## 2017-03-09 PROCEDURE — 25000003 PHARM REV CODE 250: Performed by: EMERGENCY MEDICINE

## 2017-03-09 PROCEDURE — P9021 RED BLOOD CELLS UNIT: HCPCS

## 2017-03-09 PROCEDURE — 83880 ASSAY OF NATRIURETIC PEPTIDE: CPT

## 2017-03-09 PROCEDURE — 86140 C-REACTIVE PROTEIN: CPT

## 2017-03-09 PROCEDURE — 99291 CRITICAL CARE FIRST HOUR: CPT | Mod: ,,, | Performed by: EMERGENCY MEDICINE

## 2017-03-09 PROCEDURE — 99223 1ST HOSP IP/OBS HIGH 75: CPT | Mod: ,,, | Performed by: NURSE PRACTITIONER

## 2017-03-09 PROCEDURE — 80053 COMPREHEN METABOLIC PANEL: CPT

## 2017-03-09 PROCEDURE — 36430 TRANSFUSION BLD/BLD COMPNT: CPT

## 2017-03-09 PROCEDURE — 84484 ASSAY OF TROPONIN QUANT: CPT | Mod: 91

## 2017-03-09 PROCEDURE — 25000003 PHARM REV CODE 250: Performed by: NURSE PRACTITIONER

## 2017-03-09 PROCEDURE — 93005 ELECTROCARDIOGRAM TRACING: CPT

## 2017-03-09 PROCEDURE — 83010 ASSAY OF HAPTOGLOBIN QUANT: CPT

## 2017-03-09 PROCEDURE — 93010 ELECTROCARDIOGRAM REPORT: CPT | Mod: ,,, | Performed by: INTERNAL MEDICINE

## 2017-03-09 PROCEDURE — 96360 HYDRATION IV INFUSION INIT: CPT | Mod: 59

## 2017-03-09 PROCEDURE — 81001 URINALYSIS AUTO W/SCOPE: CPT

## 2017-03-09 PROCEDURE — 87205 SMEAR GRAM STAIN: CPT

## 2017-03-09 PROCEDURE — 20600001 HC STEP DOWN PRIVATE ROOM

## 2017-03-09 PROCEDURE — 84134 ASSAY OF PREALBUMIN: CPT

## 2017-03-09 PROCEDURE — 99291 CRITICAL CARE FIRST HOUR: CPT | Mod: 25

## 2017-03-09 PROCEDURE — 96366 THER/PROPH/DIAG IV INF ADDON: CPT

## 2017-03-09 PROCEDURE — 84466 ASSAY OF TRANSFERRIN: CPT | Mod: 91

## 2017-03-09 PROCEDURE — 87040 BLOOD CULTURE FOR BACTERIA: CPT

## 2017-03-09 PROCEDURE — 96361 HYDRATE IV INFUSION ADD-ON: CPT | Mod: 59

## 2017-03-09 PROCEDURE — 85730 THROMBOPLASTIN TIME PARTIAL: CPT

## 2017-03-09 PROCEDURE — 86901 BLOOD TYPING SEROLOGIC RH(D): CPT

## 2017-03-09 PROCEDURE — 86920 COMPATIBILITY TEST SPIN: CPT

## 2017-03-09 PROCEDURE — 86900 BLOOD TYPING SEROLOGIC ABO: CPT

## 2017-03-09 PROCEDURE — 85610 PROTHROMBIN TIME: CPT

## 2017-03-09 PROCEDURE — 85520 HEPARIN ASSAY: CPT

## 2017-03-09 RX ORDER — TRAMADOL HYDROCHLORIDE 50 MG/1
50 TABLET ORAL EVERY 6 HOURS PRN
Status: DISCONTINUED | OUTPATIENT
Start: 2017-03-09 | End: 2017-03-12

## 2017-03-09 RX ORDER — SODIUM CHLORIDE 0.9 % (FLUSH) 0.9 %
3 SYRINGE (ML) INJECTION EVERY 8 HOURS
Status: DISCONTINUED | OUTPATIENT
Start: 2017-03-09 | End: 2017-03-19 | Stop reason: HOSPADM

## 2017-03-09 RX ORDER — CLONIDINE HYDROCHLORIDE 0.1 MG/1
0.1 TABLET ORAL 2 TIMES DAILY
Status: DISCONTINUED | OUTPATIENT
Start: 2017-03-09 | End: 2017-03-11

## 2017-03-09 RX ORDER — SODIUM CHLORIDE 9 MG/ML
500 INJECTION, SOLUTION INTRAVENOUS
Status: COMPLETED | OUTPATIENT
Start: 2017-03-09 | End: 2017-03-09

## 2017-03-09 RX ORDER — OXYCODONE HYDROCHLORIDE 5 MG/1
5 TABLET ORAL EVERY 4 HOURS PRN
Status: DISCONTINUED | OUTPATIENT
Start: 2017-03-09 | End: 2017-03-09

## 2017-03-09 RX ORDER — SILDENAFIL CITRATE 20 MG/1
20 TABLET ORAL 3 TIMES DAILY
Status: DISCONTINUED | OUTPATIENT
Start: 2017-03-09 | End: 2017-03-14

## 2017-03-09 RX ORDER — HYDROCODONE BITARTRATE AND ACETAMINOPHEN 500; 5 MG/1; MG/1
TABLET ORAL
Status: DISCONTINUED | OUTPATIENT
Start: 2017-03-09 | End: 2017-03-12

## 2017-03-09 RX ORDER — LABETALOL 100 MG/1
300 TABLET, FILM COATED ORAL EVERY 12 HOURS
Status: DISCONTINUED | OUTPATIENT
Start: 2017-03-09 | End: 2017-03-09

## 2017-03-09 RX ORDER — HYDRALAZINE HYDROCHLORIDE 50 MG/1
100 TABLET, FILM COATED ORAL EVERY 8 HOURS
Status: DISCONTINUED | OUTPATIENT
Start: 2017-03-09 | End: 2017-03-19 | Stop reason: HOSPADM

## 2017-03-09 RX ORDER — PANTOPRAZOLE SODIUM 40 MG/1
40 TABLET, DELAYED RELEASE ORAL DAILY
Status: DISCONTINUED | OUTPATIENT
Start: 2017-03-09 | End: 2017-03-19 | Stop reason: HOSPADM

## 2017-03-09 RX ORDER — ACETAMINOPHEN 325 MG/1
650 TABLET ORAL EVERY 8 HOURS PRN
Status: DISCONTINUED | OUTPATIENT
Start: 2017-03-09 | End: 2017-03-19 | Stop reason: HOSPADM

## 2017-03-09 RX ORDER — OXYCODONE HYDROCHLORIDE 5 MG/1
10 TABLET ORAL EVERY 4 HOURS PRN
Status: DISCONTINUED | OUTPATIENT
Start: 2017-03-09 | End: 2017-03-19 | Stop reason: HOSPADM

## 2017-03-09 RX ORDER — POLYETHYLENE GLYCOL 3350 17 G/17G
17 POWDER, FOR SOLUTION ORAL DAILY PRN
Status: DISCONTINUED | OUTPATIENT
Start: 2017-03-09 | End: 2017-03-19 | Stop reason: HOSPADM

## 2017-03-09 RX ORDER — HEPARIN SODIUM,PORCINE/D5W 25000/250
23 INTRAVENOUS SOLUTION INTRAVENOUS CONTINUOUS
Status: DISCONTINUED | OUTPATIENT
Start: 2017-03-09 | End: 2017-03-13

## 2017-03-09 RX ORDER — LABETALOL 100 MG/1
300 TABLET, FILM COATED ORAL EVERY 12 HOURS
Status: DISCONTINUED | OUTPATIENT
Start: 2017-03-09 | End: 2017-03-10

## 2017-03-09 RX ORDER — AMLODIPINE BESYLATE 5 MG/1
5 TABLET ORAL DAILY
Status: DISCONTINUED | OUTPATIENT
Start: 2017-03-09 | End: 2017-03-10

## 2017-03-09 RX ORDER — ONDANSETRON 2 MG/ML
4 INJECTION INTRAMUSCULAR; INTRAVENOUS EVERY 6 HOURS PRN
Status: DISCONTINUED | OUTPATIENT
Start: 2017-03-09 | End: 2017-03-19

## 2017-03-09 RX ORDER — ASPIRIN 81 MG/1
81 TABLET ORAL DAILY
Status: DISCONTINUED | OUTPATIENT
Start: 2017-03-09 | End: 2017-03-11

## 2017-03-09 RX ORDER — ONDANSETRON 8 MG/1
8 TABLET, ORALLY DISINTEGRATING ORAL EVERY 8 HOURS PRN
Status: DISCONTINUED | OUTPATIENT
Start: 2017-03-09 | End: 2017-03-19

## 2017-03-09 RX ORDER — SEVELAMER CARBONATE 800 MG/1
800 TABLET, FILM COATED ORAL
Status: DISCONTINUED | OUTPATIENT
Start: 2017-03-09 | End: 2017-03-19 | Stop reason: HOSPADM

## 2017-03-09 RX ADMIN — LABETALOL HCL 300 MG: 100 TABLET, FILM COATED ORAL at 10:03

## 2017-03-09 RX ADMIN — HYDRALAZINE HYDROCHLORIDE 100 MG: 50 TABLET ORAL at 10:03

## 2017-03-09 RX ADMIN — OXYCODONE HYDROCHLORIDE 10 MG: 5 TABLET ORAL at 10:03

## 2017-03-09 RX ADMIN — ASPIRIN 81 MG: 81 TABLET, COATED ORAL at 02:03

## 2017-03-09 RX ADMIN — SODIUM CHLORIDE 500 ML: 0.9 INJECTION, SOLUTION INTRAVENOUS at 01:03

## 2017-03-09 RX ADMIN — OXYCODONE HYDROCHLORIDE 10 MG: 5 TABLET ORAL at 06:03

## 2017-03-09 RX ADMIN — HYDRALAZINE HYDROCHLORIDE 100 MG: 50 TABLET ORAL at 02:03

## 2017-03-09 RX ADMIN — SEVELAMER CARBONATE 800 MG: 800 TABLET, FILM COATED ORAL at 06:03

## 2017-03-09 RX ADMIN — HEPARIN SODIUM AND DEXTROSE 17 UNITS/KG/HR: 10000; 5 INJECTION INTRAVENOUS at 12:03

## 2017-03-09 RX ADMIN — PANTOPRAZOLE SODIUM 40 MG: 40 TABLET, DELAYED RELEASE ORAL at 02:03

## 2017-03-09 RX ADMIN — CLONIDINE HYDROCHLORIDE 0.1 MG: 0.1 TABLET ORAL at 10:03

## 2017-03-09 RX ADMIN — AMLODIPINE BESYLATE 5 MG: 5 TABLET ORAL at 06:03

## 2017-03-09 RX ADMIN — SILDENAFIL 20 MG: 20 TABLET ORAL at 02:03

## 2017-03-09 RX ADMIN — SILDENAFIL 20 MG: 20 TABLET ORAL at 10:03

## 2017-03-09 RX ADMIN — LABETALOL HCL 300 MG: 100 TABLET, FILM COATED ORAL at 02:03

## 2017-03-09 NOTE — ASSESSMENT & PLAN NOTE
-dialyzed Wednesday per routine via tunneled vas-cath in right upper chest  -nephrology consulted, will await recommendations regarding dialysis schedule in house, likely will need to hold fluid removal and just run for ultrafiltration   -electrolytes WNL, will monitor labs in AM with CMP, Mag, and phos level

## 2017-03-09 NOTE — SUBJECTIVE & OBJECTIVE
Past Medical History:   Diagnosis Date    CKD (chronic kidney disease) stage 3, GFR 30-59 ml/min     Hypertension     Tachycardia        Past Surgical History:   Procedure Laterality Date    CARDIAC SURGERY      aortic valve replacement       Review of patient's allergies indicates:  No Known Allergies    No current facility-administered medications on file prior to encounter.      Current Outpatient Prescriptions on File Prior to Encounter   Medication Sig    ascorbic acid, vitamin C, (VITAMIN C) 500 MG tablet Take 1 tablet (500 mg total) by mouth 2 (two) times daily.    aspirin (ECOTRIN) 81 MG EC tablet Take 1 tablet (81 mg total) by mouth once daily.    cloNIDine (CATAPRES) 0.1 MG tablet Take 1 tablet (0.1 mg total) by mouth 2 (two) times daily.    docusate sodium (COLACE) 100 MG capsule Take 1 capsule (100 mg total) by mouth 2 (two) times daily.    hydrALAZINE (APRESOLINE) 100 MG tablet TAKE ONE TABLET BY MOUTH THREE TIMES DAILY FOR  HYPERTENSION    labetalol (NORMODYNE) 300 MG tablet Take 1 tablet (300 mg total) by mouth every 12 (twelve) hours.    omega-3 fatty acids-vitamin E (FISH OIL) 1,000 mg Cap Take 1 capsule by mouth once daily.    oxycodone (ROXICODONE) 5 MG immediate release tablet Take 1 tablet (5 mg total) by mouth every 4 (four) hours as needed.    sevelamer carbonate (RENVELA) 800 mg Tab Take 1 tablet (800 mg total) by mouth 3 (three) times daily with meals.    sildenafil (REVATIO) 20 mg Tab Take 1 tablet (20 mg total) by mouth 3 (three) times daily.    warfarin (COUMADIN) 7.5 MG tablet Take 1 tablet (7.5 mg total) by mouth Daily.     Family History     None        Social History Main Topics    Smoking status: Never Smoker    Smokeless tobacco: Not on file    Alcohol use No      Comment: no longer drinks since surgery    Drug use: No    Sexual activity: Not on file     Review of Systems   Constitutional: Positive for fatigue. Negative for chills, diaphoresis, fever and  unexpected weight change.   HENT: Negative for congestion and sinus pressure.    Respiratory: Positive for chest tightness and shortness of breath. Negative for cough, choking and wheezing.    Cardiovascular: Positive for chest pain and palpitations. Negative for leg swelling.   Gastrointestinal: Negative for abdominal distention, abdominal pain, blood in stool, diarrhea, nausea and vomiting.   Genitourinary: Negative for difficulty urinating and hematuria.   Skin: Positive for wound (midline sternal incision ). Negative for color change and rash.   Neurological: Negative for dizziness, light-headedness and headaches.   Psychiatric/Behavioral: The patient is not nervous/anxious.      Objective:     Vital Signs (Most Recent):  Temp: 99.1 °F (37.3 °C) (03/09/17 1353)  Pulse: 106 (03/09/17 1516)  Resp: (!) 28 (03/09/17 1516)  BP: (!) 151/98 (03/09/17 1516)  SpO2: 100 % (03/09/17 1353) Vital Signs (24h Range):  Temp:  [98.7 °F (37.1 °C)-99.1 °F (37.3 °C)] 99.1 °F (37.3 °C)  Pulse:  [104-112] 106  Resp:  [10-34] 28  SpO2:  [94 %-100 %] 100 %  BP: (150-174)/() 151/98     Weight: 74.8 kg (165 lb)  Body mass index is 21.18 kg/(m^2).    Physical Exam   Constitutional: He is oriented to person, place, and time. He appears well-developed. No distress.   -nutrition status appears poort   Neck: Normal range of motion. No JVD present. No tracheal deviation present.   Cardiovascular: Normal heart sounds and intact distal pulses.  Tachycardia present.    Pulmonary/Chest: Effort normal. No respiratory distress. He has decreased breath sounds in the right middle field, the right lower field and the left lower field. He exhibits tenderness.   Abdominal: Soft. Bowel sounds are normal. He exhibits no distension. There is no tenderness.   Musculoskeletal: He exhibits no edema.        Right shoulder: He exhibits no swelling.   Neurological: He is alert and oriented to person, place, and time.   Skin: Skin is warm and dry. He is not  diaphoretic.   Midline healing sternal incision    Psychiatric: He has a normal mood and affect.        Significant Labs:   BMP:   Recent Labs  Lab 17  0928   GLU 93      K 3.5   CL 98   CO2 29   BUN 28*   CREATININE 4.5*   CALCIUM 8.6*     CBC:   Recent Labs  Lab 17  0928   WBC 7.73   HGB 6.6*   HCT 20.9*        CMP:   Recent Labs  Lab 17  0928      K 3.5   CL 98   CO2 29   GLU 93   BUN 28*   CREATININE 4.5*   CALCIUM 8.6*   PROT 7.7   ALBUMIN 2.0*   BILITOT 0.6   ALKPHOS 100   AST 16   ALT 23   ANIONGAP 9   EGFRNONAA 14.6*     Lipid Panel: No results for input(s): CHOL, HDL, LDLCALC, TRIG, CHOLHDL in the last 48 hours.  POCT Glucose: No results for input(s): POCTGLUCOSE in the last 48 hours.  Troponin:   Recent Labs  Lab 1728 17  1215   TROPONINI 0.115* 0.135*     All pertinent labs within the past 24 hours have been reviewed.    Significant Imaging:  CXR: Bilateral subpulmonic pleural effusions. Cardiomegaly.    EK-MAR-2017 09:23:27 EKG  System  Sinus tachycardia  Nonspecific intraventricular block  T wave abnormality, consider lateral ischemia  Abnormal ECG   I have reviewed all pertinent imaging results/findings within the past 24 hours.

## 2017-03-09 NOTE — ASSESSMENT & PLAN NOTE
-BP slightly improving, on home medications listed above, will add amlodipine 5 mg PO daily for further optimization  -continue to monitor BP and HR

## 2017-03-09 NOTE — ED TRIAGE NOTES
Pt arrives from [facility] for elevated heart rate. Was reportedly in SVT at other facility, converted with Verapamil. Pt reports pain to incision site/midsternal, pain with respirations - pressure. On dialysis MWF - last treatment yesterday. Denies experiencing symptoms during dialysis. Reports chest pressure started following conversion.

## 2017-03-09 NOTE — CONSULTS
[facility]  Cardiology Consult Note    Attending Physician: Gavin Carroll MD  Reason for Consult: SVT    HPI:     47 year old male patient with a history of recent aortic valve replacement (2/8/17, 22 mm mechanical, indication: mod/severe aortic stenosis and regurgitation), tricuspid valve repair (28 mm annuloplasty), redo sternotomy, possible ASD closure as a child, ESRD on HD, pulmonary HTN, HTN. The patient was last discharged from the CV Surgery service on 2/22/16.    The patient presents to the ED as a transfer from OSH for management of tachycardia. The patient reports that he has not felt any better since he was discharged from the hospital after his surgery. He has been having very frequent episodes of tachycardia with HR upto 170-180s. These tachycardia episodes are associated with chest tightness, SOB, dizziness. No syncope. He has been having intermittent epistaxis, mouth bleeding and bleeding from ears. The patient has had several ED visits. His bleeding was attributed to supratherapeutic INR (5.0). He was given vitamin K which brought his INR to 1. The patient reports having pain at the surgical incision sites, especially when he takes a deep breath. His blood pressure has been labile. His BP dropped during HD yesterday and he was pre-syncopal.    He presented to OSH ED earlier with tachycardia, chest tightness and he was found to have a wide complex tachycardia (presumed to be SVT with RBBB aberrancy). He was administered verapamil which cardioverted him.    Review of Systems   Review of Systems   Constitution: Positive for weakness. Negative for chills and fever.   HENT: Positive for nosebleeds.    Cardiovascular: Positive for chest pain, dyspnea on exertion, near-syncope, orthopnea and palpitations. Negative for irregular heartbeat, leg swelling, paroxysmal nocturnal dyspnea and syncope.   Respiratory: Positive for shortness of breath. Negative for sleep disturbances due to breathing.     Hematologic/Lymphatic: Positive for bleeding problem.   Gastrointestinal: Positive for hemorrhoids. Negative for hematemesis and melena.   Neurological: Negative for dizziness and focal weakness.   Psychiatric/Behavioral: Negative for altered mental status.         PMH:     Past Medical History:   Diagnosis Date    CKD (chronic kidney disease) stage 3, GFR 30-59 ml/min     Hypertension     Tachycardia      Past Surgical History:   Procedure Laterality Date    CARDIAC SURGERY      aortic valve replacement        Allergies:     Review of patient's allergies indicates:  No Known Allergies     Medications:     No current facility-administered medications on file prior to encounter.      Current Outpatient Prescriptions on File Prior to Encounter   Medication Sig Dispense Refill    aspirin (ECOTRIN) 81 MG EC tablet Take 1 tablet (81 mg total) by mouth once daily.  0    cloNIDine (CATAPRES) 0.1 MG tablet Take 1 tablet (0.1 mg total) by mouth 2 (two) times daily. 60 tablet 11    docusate sodium (COLACE) 100 MG capsule Take 1 capsule (100 mg total) by mouth 2 (two) times daily.  0    hydrALAZINE (APRESOLINE) 100 MG tablet TAKE ONE TABLET BY MOUTH THREE TIMES DAILY FOR  HYPERTENSION 90 tablet 1    labetalol (NORMODYNE) 300 MG tablet Take 1 tablet (300 mg total) by mouth every 12 (twelve) hours. 60 tablet 1    oxycodone (ROXICODONE) 5 MG immediate release tablet Take 1 tablet (5 mg total) by mouth every 4 (four) hours as needed. 60 tablet 0    sevelamer carbonate (RENVELA) 800 mg Tab Take 1 tablet (800 mg total) by mouth 3 (three) times daily with meals. 90 tablet 11    sildenafil (REVATIO) 20 mg Tab Take 1 tablet (20 mg total) by mouth 3 (three) times daily. 90 tablet 11    warfarin (COUMADIN) 7.5 MG tablet Take 1 tablet (7.5 mg total) by mouth Daily. 30 tablet 11    ascorbic acid, vitamin C, (VITAMIN C) 500 MG tablet Take 1 tablet (500 mg total) by mouth 2 (two) times daily.      omega-3 fatty  acids-vitamin E (FISH OIL) 1,000 mg Cap Take 1 capsule by mouth once daily.  0        Social History:     Social History   Substance Use Topics    Smoking status: Never Smoker    Smokeless tobacco: Not on file    Alcohol use No      Comment: no longer drinks since surgery        Family History:     History reviewed. No pertinent family history.     Physical Exam:     Vitals:  Temp:  [98.7 °F (37.1 °C)]   Pulse:  [108-112]   Resp:  [18-23]   BP: (153-161)/()   SpO2:  [94 %]   I/O's:  No intake or output data in the 24 hours ending 03/09/17 0953     Physical Exam   Constitutional: He is oriented to person, place, and time. No distress.   HENT:   Head: Normocephalic and atraumatic.   Eyes: No scleral icterus.   Neck: No JVD present.   Cardiovascular:   Tachycardic, mechanical 2nd heard sound.   Pulmonary/Chest: He has no wheezes.   Right basilar decreased breath sounds.  Midline scar and right upper chest dialysis catheter in place.   Abdominal: There is no tenderness. There is no rebound.   Musculoskeletal:   Trace lower extremity edema   Neurological: He is alert and oriented to person, place, and time.   Skin: Skin is warm and dry. He is not diaphoretic.   Psychiatric: He has a normal mood and affect. His behavior is normal.         Labs:     No results found for this or any previous visit (from the past 336 hour(s)).    No results found for this or any previous visit (from the past 336 hour(s)).    Lab Results   Component Value Date    CHOL 215 (H) 02/26/2012    HDL 26 (L) 02/26/2012    LDLCALC 153.0 02/26/2012    TRIG 182 (H) 02/26/2012       No results for input(s): TROPONINI, CPKMB, CPK, MB in the last 168 hours.    No results found for: HGBA1C    CrCl cannot be calculated (Patient has no serum creatinine result on file.).    Imaging:  CXR: Bilateral pleural effusion (R>L), cardiomegaly.    Echo: 2/6/17   1 - Possible ASD closure based on images. Surgery at age 8 y.o..     2 - Concentric LVH with normal  left ventricular systolic function (EF 60-65%).     3 - Biatrial enlargement.     4 - Left ventricular diastolic dysfunction with increased LAP.     5 - Appears to be native valve. The native moderate to severe aortic stenosis, JHOANA = 1.2 cm2, peak velocity = 4.2 m/s, mean gradient = 46.0 mmHg.     6 - Right coronary artery is seen arising from the aorta.     7 - Moderate tricuspid regurgitation.     8 - Pulmonary hypertension. The estimated PA systolic pressure is 75 mmHg.     9 - Right ventricular enlargement with hypertrophy, with mildly depressed systolic function.     EKG:   3/9/17: Sinus tachycardia, non-specific IVCD, t-wave inversion - lateral leads. No new ischemic changes.  3/9/17 OSH ECG: Wide-complex regular tachycardia with RBBB pattern - Likely SVT with aberrancy.    Telemetry:   Telemetry currently shows: Sinus tachycardia    Assessment & Recommendations:     47 year old male patient with a history of recent aortic valve replacement (2/8/17, 22 mm mechanical, indication: mod/severe aortic stenosis and regurgitation), tricuspid valve repair (28 mm annuloplasty), redo sternotomy, possible ASD closure as a child, ESRD on HD, pulmonary HTN, HTN. The patient was seen with the following diagnoses:    1) Wide Complex Tachycardia - Likely SVT with Aberrancy  - Currently in sinus tachycardia. Responded to verapamil.  2) S/P AVR and Tricuspid valve repair  - Surgical incision healing well.  - Anticoagulated with warfarin. Labile INR. It is subtherapeutic today.  3) Anemia  - Normocytic.   - Likely multifactorial: Blood loss, iron deficiency, ESRD.  - Hemoglobin down to 6.6 from 7.7 on discharge.  4) Elevated Troponin  - Likely due to tachycardia in the setting of recent heart surgery and ESRD.  - No anginal chest pain, no new ischemic changes in ECG.  - ACS is unlikely with these findings.  5) ESRD  - HD on MWF.    Recommendations:  - Admit to medicine and cardiology to follow.  - Should notify CTS that the  patient is here.  - Obtain iron panel and transfuse with 2U of PRBC since patient is tachycardic even at rest and having SOB with exercise.  - Lasix 40 mg IV X 1 after transfusion.  - Should obtain 2D echo with cfd to evaluate the valve and heart function.  - Consult EP for SVT. May need to switch his labetalol to verapamil.  - Should check 1 more set of troponin.  - Needs bridging with heparin for subtherapetic INR.    I discussed with Dr. Pan.    Thank you for this consult. Further recommendations are pending the attending addendum. Please do not hesitate to page with questions.     Signed:  Isidoro Barry MD  Cardiology Fellow, PGY-6  Pager: [phone]  3/9/2017 9:53 AM    Attending Addendum:

## 2017-03-09 NOTE — H&P
"[facility] Hospital Medicine  History & Physical    Patient Name: Isidro White  MRN: [id number]  Admission Date: 3/9/2017  Attending Physician: Jose Bond MD  Primary Care Provider: Provider [facility] Medicine Team: [facility]P MED ALMA Flynn NP     Patient information was obtained from patient, relative(s), past medical records and ER records.     Subjective:     Principal Problem:SVT (supraventricular tachycardia)    Chief Complaint:   Chief Complaint   Patient presents with    Transfer     Transfer from [facility] for SVT-. Converted to Sinus Tach.  on arrival.         HPI: Mr. White is a 46 yo AAM who has PMH of AV replacement and TV repair on February 8, 2017, ASD closure at age 8, ESRD on HD M/W/F, pulmonary hypertension, and HTN. He presented to the ED from [facility] after being seen for SVT and given verapamil. On arrival to the ED he is noted to be in sinus tach around 105-110. He reports a similar episode earlier in the week 3/7/2017 where he was seen at [facility] for what he describes as a fast then slowing then fast then slowing heart rate, he was treated with coreg and sent home. This morning around 0200/0300 he was awoken by the same fast then slowing then fast again heart rate, he asked for assistance from his brother and they checked his blood pressure and heart rate which they report as being heart rate 179, and . Thus prompting his visit to the hospital today. He denies stabbing chest pain, but does however report chest tightness and shortness of breath. He denies any radiation of the midsternal chest tightness. He denies headache, cough, fever, chills, night sweats, N/V/D. He reports strict adherence to renal diet and "32 ounce" fluid restriction. He denies any caffeine intake or energy drink use. He denies anxiety in the past, but endorses stress over the last month with all his medical issues.     He also " reports that on 3/4/17 he started to have bleeding from his mouth, nose, and ears. He was seen again at [facility] and given 10 mL of vitamin K for an INR level of 5, per brother. He has had no further bleeding and denies tarry stools or blood in stools. As an ESRD patient he was dialyzed per his schedule on Wednesday 3/8/2017. He has a vas-cath in the right upper chest and denies bleeding from this site. He reports urinating 2-3 times per day yellow urine, denies hematuria. H/H upon admit is 6.6/ 20.9. Iron panel and hemolysis labs were sent prior to initiating blood products.    He is being admitted to the Hospital Medicine Service for further evaluation.       Past Medical History:   Diagnosis Date    CKD (chronic kidney disease) stage 3, GFR 30-59 ml/min     Hypertension     Tachycardia        Past Surgical History:   Procedure Laterality Date    CARDIAC SURGERY      aortic valve replacement       Review of patient's allergies indicates:  No Known Allergies    No current facility-administered medications on file prior to encounter.      Current Outpatient Prescriptions on File Prior to Encounter   Medication Sig    ascorbic acid, vitamin C, (VITAMIN C) 500 MG tablet Take 1 tablet (500 mg total) by mouth 2 (two) times daily.    aspirin (ECOTRIN) 81 MG EC tablet Take 1 tablet (81 mg total) by mouth once daily.    cloNIDine (CATAPRES) 0.1 MG tablet Take 1 tablet (0.1 mg total) by mouth 2 (two) times daily.    docusate sodium (COLACE) 100 MG capsule Take 1 capsule (100 mg total) by mouth 2 (two) times daily.    hydrALAZINE (APRESOLINE) 100 MG tablet TAKE ONE TABLET BY MOUTH THREE TIMES DAILY FOR  HYPERTENSION    labetalol (NORMODYNE) 300 MG tablet Take 1 tablet (300 mg total) by mouth every 12 (twelve) hours.    omega-3 fatty acids-vitamin E (FISH OIL) 1,000 mg Cap Take 1 capsule by mouth once daily.    oxycodone (ROXICODONE) 5 MG immediate release tablet Take 1 tablet (5 mg total) by mouth every 4  (four) hours as needed.    sevelamer carbonate (RENVELA) 800 mg Tab Take 1 tablet (800 mg total) by mouth 3 (three) times daily with meals.    sildenafil (REVATIO) 20 mg Tab Take 1 tablet (20 mg total) by mouth 3 (three) times daily.    warfarin (COUMADIN) 7.5 MG tablet Take 1 tablet (7.5 mg total) by mouth Daily.     Family History     None        Social History Main Topics    Smoking status: Never Smoker    Smokeless tobacco: Not on file    Alcohol use No      Comment: no longer drinks since surgery    Drug use: No    Sexual activity: Not on file     Review of Systems   Constitutional: Positive for fatigue. Negative for chills, diaphoresis, fever and unexpected weight change.   HENT: Negative for congestion and sinus pressure.    Respiratory: Positive for chest tightness and shortness of breath. Negative for cough, choking and wheezing.    Cardiovascular: Positive for chest pain and palpitations. Negative for leg swelling.   Gastrointestinal: Negative for abdominal distention, abdominal pain, blood in stool, diarrhea, nausea and vomiting.   Genitourinary: Negative for difficulty urinating and hematuria.   Skin: Positive for wound (midline sternal incision ). Negative for color change and rash.   Neurological: Negative for dizziness, light-headedness and headaches.   Psychiatric/Behavioral: The patient is not nervous/anxious.      Objective:     Vital Signs (Most Recent):  Temp: 99.1 °F (37.3 °C) (03/09/17 1353)  Pulse: 106 (03/09/17 1516)  Resp: (!) 28 (03/09/17 1516)  BP: (!) 151/98 (03/09/17 1516)  SpO2: 100 % (03/09/17 1353) Vital Signs (24h Range):  Temp:  [98.7 °F (37.1 °C)-99.1 °F (37.3 °C)] 99.1 °F (37.3 °C)  Pulse:  [104-112] 106  Resp:  [10-34] 28  SpO2:  [94 %-100 %] 100 %  BP: (150-174)/() 151/98     Weight: 74.8 kg (165 lb)  Body mass index is 21.18 kg/(m^2).    Physical Exam   Constitutional: He is oriented to person, place, and time. He appears well-developed. No distress.   -nutrition  status appears poort   Neck: Normal range of motion. No JVD present. No tracheal deviation present.   Cardiovascular: Normal heart sounds and intact distal pulses.  Tachycardia present.    Pulmonary/Chest: Effort normal. No respiratory distress. He has decreased breath sounds in the right middle field, the right lower field and the left lower field. He exhibits tenderness.   Abdominal: Soft. Bowel sounds are normal. He exhibits no distension. There is no tenderness.   Musculoskeletal: He exhibits no edema.        Right shoulder: He exhibits no swelling.   Neurological: He is alert and oriented to person, place, and time.   Skin: Skin is warm and dry. He is not diaphoretic.   Midline healing sternal incision    Psychiatric: He has a normal mood and affect.        Significant Labs:   BMP:   Recent Labs  Lab 17   GLU 93      K 3.5   CL 98   CO2 29   BUN 28*   CREATININE 4.5*   CALCIUM 8.6*     CBC:   Recent Labs  Lab 17   WBC 7.73   HGB 6.6*   HCT 20.9*        CMP:   Recent Labs  Lab 17      K 3.5   CL 98   CO2 29   GLU 93   BUN 28*   CREATININE 4.5*   CALCIUM 8.6*   PROT 7.7   ALBUMIN 2.0*   BILITOT 0.6   ALKPHOS 100   AST 16   ALT 23   ANIONGAP 9   EGFRNONAA 14.6*     Troponin:   Recent Labs  Lab 17  1215   TROPONINI 0.115* 0.135*     All pertinent labs within the past 24 hours have been reviewed.    Significant Imaging:  CXR: Bilateral subpulmonic pleural effusions. Cardiomegaly.    EK-MAR-2017 09:23:27 EKG  System  Sinus tachycardia  Nonspecific intraventricular block  T wave abnormality, consider lateral ischemia  Abnormal ECG   I have reviewed all pertinent imaging results/findings within the past 24 hours.       Assessment/Plan:     * SVT (supraventricular tachycardia)  -SVT per EKG from OSH, now in sinus tach with occasional PAC's  -likely due to significant anemia and volume depletion, dialyzed yesterday and has  been restricting himself to 32 ounces of fluid per day, PE demonstrates dry ashy skin and pruning of lower extremities  -will transfuse with two units of PRBC's, and have sent Iron/TIBC as well as hemolysis studies  -restarted home medications including labetalol 300 mg PO BID as well as clonidine 0.1 mg PO BID and hydralazine 100 mg TID  -on tele, will monitor for improvement with volume replacement and oral meds    Volume depletion  -see above plan, will reasses volume status post transfusion     S/P AVR (aortic valve replacement)  -on heparin gtt for sub therapeutic INR of 1.7  -will bridge back to warfarin, after pulmonology recommendations regarding need for thorocentisis (see below)  -home warfarin dose was 7.5 mg daily    ESRD (end stage renal disease)  -dialyzed Wednesday per routine via tunneled vas-cath in right upper chest  -nephrology consulted, will await recommendations regarding dialysis schedule in house, likely will need to hold fluid removal and just run for dialysis with no ultrafiltration   -electrolytes WNL, will monitor labs in AM with CMP, Mag, and phos level     Pleural effusion  -breath sounds diminished in left base and absent in right midde/lower lobe, with reports of significant pain with deep breaths, he sleeps on left side as this is more comfortable for him  -pulmonology consulted regarding bilateral subpleural effusions noted on CXR, will await recommendations/optimal timing regarding for thoracocentesis as patient is below 2 on INR and on heparin gtt currently  -follow up CXR as needed for shortness of breath or post procedure     Uncontrolled hypertension  -BP slightly improving, on home medications listed above, will add amlodipine 5 mg PO daily for further optimization  -continue to monitor BP and HR     Pulmonary HTN  -continue home dose of sildenafil 20 mg PO TID  -2D echo in February 2017 with PAP 75    Serum albumin decreased; albumin 2.0  -nutritional consult for dietary  eduction in AM, needs teaching regarding renal diet and coumadin diet as well as appropriate fluid restriction of 1500 mL/day  - checking crp/ prealbumin    Adult congenital heart disease  -ASD closure at age 8    VTE Risk Mitigation     None        Kika Knox NP  Department of Hospital Medicine   [facility]

## 2017-03-09 NOTE — ED NOTES
Pt placed in hospital gown, placed on continuous cardiac, Bp, and O2 monitoring. Side rails up x 2, bed locked for safety. Call bell placed within reach. Updated on plan of care. Will continue to monitor.

## 2017-03-09 NOTE — IP AVS SNAPSHOT
[facility]  [address]  [facility] [address]  Phone: [phone]           Patient Discharge Instructions     Our goal is to set you up for success. This packet includes information on your condition, medications, and your home care. It will help you to care for yourself so you don't get sicker and need to go back to the hospital.     Please ask your nurse if you have any questions.        There are many details to remember when preparing to leave the hospital. Here is what you will need to do:    1. Take your medicine. If you are prescribed medications, review your Medication List in the following pages. You may have new medications to  at the pharmacy and others that you'll need to stop taking. Review the instructions for how and when to take your medications. Talk with your doctor or nurses if you are unsure of what to do.     2. Go to your follow-up appointments. Specific follow-up information is listed in the following pages. Your may be contacted by a transition nurse or clinical provider about future appointments. Be sure we have all of the phone numbers to reach you, if needed. Please contact your provider's office if you are unable to make an appointment.     3. Watch for warning signs. Your doctor or nurse will give you detailed warning signs to watch for and when to call for assistance. These instructions may also include educational information about your condition. If you experience any of warning signs to your health, call your doctor.               [facility]  Unless otherwise directed by your provider, please contact [facility], our nurse care line that is available for 24/7 assistance.     [phone] (toll-free)    Registered nurses in the [facility] provide clinical advisement, health education, appointment booking, and other advisory services.                    ** Verify the list of medication(s) below is accurate and up  to date. Carry this with you in case of emergency. If your medications have changed, please notify your healthcare provider.             Medication List      START taking these medications        Additional Info    Begin Date AM Noon PM Bedtime    acetaminophen 325 MG tablet   Commonly known as:  TYLENOL   Refills:  0   Dose:  650 mg    Last time this was given:  650 mg on 3/16/2017  8:55 PM   Instructions:  Take 2 tablets (650 mg total) by mouth every 8 (eight) hours as needed.                            colchicine 0.6 mg tablet   Quantity:  4 tablet   Refills:  0    Last time this was given:  0.3 mg on 3/14/2017  5:14 PM   Instructions:  Take a half tablet every 7 days.                    3/21/17           epoetin vikki 10,000 unit/mL injection   Commonly known as:  PROCRIT   Refills:  0   Dose:  38194 Units    Start Date:  3/20/2017   Last time this was given:  10,000 Units on 3/17/2017 12:58 PM   Instructions:  Inject 1 mL (10,000 Units total) into the skin every Mon, Wed, Fri. To be given with HD            3/20/17                   furosemide 80 MG tablet   Commonly known as:  LASIX   Quantity:  180 tablet   Refills:  0   Dose:  80 mg    Instructions:  Take 1 tablet (80 mg total) by mouth 2 (two) times daily.                    3/19/17           HYDROmorphone 2 MG tablet   Commonly known as:  DILAUDID   Quantity:  6 tablet   Refills:  0   Dose:  2 mg    Last time this was given:  2 mg on 3/19/2017  6:14 AM   Instructions:  Take 1 tablet (2 mg total) by mouth every 4 (four) hours as needed.                            hydrOXYzine HCl 25 MG tablet   Commonly known as:  ATARAX   Quantity:  60 tablet   Refills:  0   Dose:  25 mg    Instructions:  Take 1 tablet (25 mg total) by mouth 3 (three) times daily as needed for Itching.                            isosorbide mononitrate 30 MG 24 hr tablet   Commonly known as:  IMDUR   Quantity:  90 tablet   Refills:  0   Dose:  30 mg    Last time this was given:  30 mg on  3/19/2017  8:49 AM   Instructions:  Take 1 tablet (30 mg total) by mouth once daily.            3/20/17                   lisinopril 20 MG tablet   Commonly known as:  PRINIVIL,ZESTRIL   Quantity:  90 tablet   Refills:  0   Dose:  20 mg    Last time this was given:  20 mg on 3/19/2017  8:49 AM   Instructions:  Take 1 tablet (20 mg total) by mouth once daily.            3/20/17                   metoprolol succinate 100 MG 24 hr tablet   Commonly known as:  TOPROL-XL   Quantity:  270 tablet   Refills:  3    Last time this was given:  250 mg on 3/19/2017  8:49 AM   Instructions:  Take two and half tablets daily            3/20/17                     CONTINUE taking these medications        Additional Info    Begin Date AM Noon PM Bedtime    ascorbic acid (vitamin C) 500 MG tablet   Commonly known as:  VITAMIN C   Refills:  0   Dose:  500 mg    Instructions:  Take 1 tablet (500 mg total) by mouth 2 (two) times daily.                    3/19/17           docusate sodium 100 MG capsule   Commonly known as:  COLACE   Refills:  0   Dose:  100 mg    Instructions:  Take 1 capsule (100 mg total) by mouth 2 (two) times daily.                    3/19/17           hydrALAZINE 100 MG tablet   Commonly known as:  APRESOLINE   Quantity:  90 tablet   Refills:  1    Last time this was given:  100 mg on 3/19/2017  6:14 AM   Instructions:  TAKE ONE TABLET BY MOUTH THREE TIMES DAILY FOR  HYPERTENSION                3/19/17               omega-3 fatty acids-vitamin E 1,000 mg Cap   Commonly known as:  FISH OIL   Refills:  0   Dose:  1 capsule    Instructions:  Take 1 capsule by mouth once daily.            3/20/17                   oxycodone 5 MG immediate release tablet   Commonly known as:  ROXICODONE   Quantity:  60 tablet   Refills:  0   Dose:  5 mg    Last time this was given:  10 mg on 3/19/2017  8:48 AM   Instructions:  Take 1 tablet (5 mg total) by mouth every 4 (four) hours as needed.                            sevelamer  carbonate 800 mg Tab   Commonly known as:  RENVELA   Quantity:  90 tablet   Refills:  11   Dose:  800 mg    Last time this was given:  800 mg on 3/19/2017  8:49 AM   Instructions:  Take 1 tablet (800 mg total) by mouth 3 (three) times daily with meals.                3/19/17               warfarin 7.5 MG tablet   Commonly known as:  COUMADIN   Quantity:  30 tablet   Refills:  11   Dose:  7.5 mg    Last time this was given:  7.5 mg on 3/18/2017  5:07 PM   Instructions:  Take 1 tablet (7.5 mg total) by mouth Daily.                    3/19/17             STOP taking these medications     aspirin 81 MG EC tablet   Commonly known as:  ECOTRIN       cloNIDine 0.1 MG tablet   Commonly known as:  CATAPRES       labetalol 300 MG tablet   Commonly known as:  NORMODYNE       sildenafil 20 mg Tab   Commonly known as:  REVATIO            Where to Get Your Medications      These medications were sent to [facility] - [address]  [address] [place] [address]     Phone:  [phone]     colchicine 0.6 mg tablet    furosemide 80 MG tablet    hydrOXYzine HCl 25 MG tablet    isosorbide mononitrate 30 MG 24 hr tablet    lisinopril 20 MG tablet    metoprolol succinate 100 MG 24 hr tablet         You can get these medications from any pharmacy     Bring a paper prescription for each of these medications     HYDROmorphone 2 MG tablet       You don't need a prescription for these medications     acetaminophen 325 MG tablet         Information about where to get these medications is not yet available     ! Ask your nurse or doctor about these medications     epoetin vikki 10,000 unit/mL injection                  Please bring to all follow up appointments:    1. A copy of your discharge instructions.  2. All medicines you are currently taking in their original bottles.  3. Identification and insurance card.    Please arrive 15 minutes ahead of scheduled appointment time.    Please call 24 hours in  advance if you must reschedule your appointment and/or time.        Your Scheduled Appointments     Mar 20, 2017  7:15 AM CDT   Dialysis with DIALYSIS, [address] [facility])    [address]  [place] [id number] [phone]              Follow-up Information     Follow up with Alcides Manning MD In 2 weeks.    Specialty:  Vascular Surgery    Why:  for f/u HD access, needs VAS US HD access    Contact information:    [address] [place] [address] [phone]          Follow up with [facility].    Why:  will need urgent follow up post discharge for INR monitoring and warfarin dosing    Contact information:    Address: [address] [place] [address]  Phone: [phone]        Follow up with Nigel Guallpa MD In 6 weeks.    Specialties:  Electrophysiology, Cardiovascular Disease    Why:  f/u svt ablation     Contact information:    [address] [place] [address] [phone]          Follow up with [facility] In 2 weeks.    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    Contact information:    [address] [place] [address] [phone]    Additional information:    3rd floor      Referrals     Future Orders    Ambulatory referral to Cardiology     Ambulatory referral to Cardiology     Ambulatory Referral to Vascular Surgery         Discharge Instructions     Future Orders    Activity as tolerated     Call MD for:  difficulty breathing or increased cough     Call MD for:  increased confusion or weakness     Call MD for:  persistent dizziness, light-headedness, or visual disturbances     Call MD for:  persistent nausea and vomiting or diarrhea     Call MD for:  redness, tenderness, or signs of infection (pain, swelling, redness, odor or green/yellow discharge around incision site)     Call MD for:  severe  "persistent headache     Call MD for:  severe uncontrolled pain     Call MD for:  temperature >100.4     Call MD for:  worsening rash     Diet general     Questions:    Total calories:      Fat restriction, if any:      Protein restriction, if any:      Na restriction, if any:  2gNa    Fluid restriction:  Fluid - 1500mL    Additional restrictions:  Renal    Low Sodium,2gm        Primary Diagnosis     Your primary diagnosis was:  Heart Failure      Admission Information     Date & Time Provider Department [facility]    3/9/2017  9:11 AM Penelope Davis MD [facility] [id number]      Care Providers     Provider Role Specialty Primary office phone    Penelope Davis MD Attending Provider Hospitalist [phone]    Jose Bond MD Team Attending  Hospitalist [phone]    Penelope Davsi MD Team Attending  Hospitalist [phone]    Leonides Kline MD Consulting Physician  Nephrology [phone]    Nigel Guallpa MD Surgeon  Electrophysiology [phone]      Your Vitals Were     BP Pulse Temp Resp Height Weight    152/98 (BP Location: Right arm, Patient Position: Lying, BP Method: Automatic) 123 99.9 °F (37.7 °C) (Oral) 20 6' 2" (1.88 m) 76 kg (167 lb 8.8 oz)    SpO2 BMI             97% 21.51 kg/m2         Recent Lab Values     No lab values to display.      Pending Labs     Order Current Status    Tissue Specimen to Pathology In process    Blood culture Preliminary result    Blood culture Preliminary result    Prepare RBC 2 Units; anemia Preliminary result    Prepare RBC 2 Units; dropping h/h recent open heart surgery Preliminary result      Allergies as of 3/19/2017     No Known Allergies      Advance Directives     An advance directive is a document which, in the event you are no longer able to make decisions for yourself, tells your healthcare team what kind of treatment you do or do not want to receive, or who you would like to make those decisions for you.  If you do not currently have " an advance directive, [facility] encourages you to create one.  For more information call:  [phone]), [phone] ([phone]),  or log on to [email or web address].        Language Assistance Services     ATTENTION: Language assistance services are available, free of charge. Please call [phone].      ATENCIÓN: Si habla geneva, tiene a sommer disposición servicios gratuitos de asistencia lingüística. Llame al [phone].     [facility] Ý: N?u b?n nói Ti?ng Vi?t, có các d?ch v? h? tr? ngôn ng? mi?n phí dành cho b?n. G?i s? [phone].        Blood Transfusion Reaction Signs and Symptoms     The blood you have received has been matched for you as carefully as possible. Most patients who receive a blood transfusion do not experience problems. However, there can be a delayed reaction that happens a few weeks after your blood transfusion. Contact your physician immediately if you experience any NEW SYMPTOMS listed below:     Fever greater than 100.4 degrees    Chills   Yellow color to your skin or eyes(Jaundice)   Back pain, chest pain, or pain at the infusion site   Weakness (more than usual)   Discomfort or uneasiness more than usual (Malaise)   Nausea or vomiting   Shortness of breath, wheezing, or coughing   Higher or lower blood pressure than normal   Skin rash, itching, skin redness, or localized swelling (example: hands or feet)   Urinating less than normal   Urine appears reddish or orange and is darker than normal      Remember that some these signs may already exist for you--such as having chronic back pain or high blood pressure. You only need to look for and report to your doctor any new occurrences since your blood transfusion that are of concern.        Heart Failure Education       Heart Failure: Being Active  You have a condition called heart failure. Being active doesnt mean that you have to wear yourself out. Even a little movement each day helps to strengthen your  heart. If you cant get out to exercise, you can do simple stretching and strengthening exercises at home. These are good ways to keep you well-conditioned and prevent you and your heart from becoming excessively weak.    Ideas to get you started  · Add a little movement to things you do now. Walk to mail letters. Park your car at the far end of the parking lot and walk to the store. Walk up a flight of stairs instead of taking the elevator.  · Choose activities you enjoy. You might walk, swim, or ride an exercise bike. Things like gardening and washing the car count, too. Other possibilities include: washing dishes, walking the dog, walking around the mall, and doing aerobic activities with friends.  · Join a group exercise program at a [facility], a senior center, or a community center. Or look into a hospital cardiac rehabilitation program. Ask your doctor if you qualify.  Tips to keep you going  · Get up and get dressed each day. Go to a coffee shop and read a newspaper or go somewhere that you'll be in the presence of other active people. Youll feel more like being active.  · Make a plan. Choose one or more activities that you enjoy and that you can easily do. Then plan to do at least one each day. You might write your plan on a calendar.  · Go with a friend or a group if you like company. This can help you feel supported and stay motivated, too.  · Plan social events that you enjoy. This will keep you mentally engaged as well as physically motivated to do things you find pleasure in.  For your safety  · Talk with your healthcare provider before starting an exercise program.  · Exercise indoors when its too hot or too cold outside, or when the air quality is poor. Try walking at a shopping mall.  · Wear socks and sturdy shoes to maintain your balance and prevent falls.  · Start slowly. Do a few minutes several times a day at first. Increase your time and speed little by little.  · Stop and rest whenever you  feel tired or get short of breath.  · Dont push yourself on days when you dont feel well.  Date Last Reviewed: 3/20/2016  © 2421-0810 [employer]. [address] [place] [address], [place] [address]. All rights reserved. This information is not intended as a substitute for professional medical care. Always follow your healthcare professional's instructions.              Heart Failure: Evaluating Your Heart  You have a condition called heart failure. To evaluate your condition, your doctor will examine you, ask questions, and do some tests. Along with looking for signs of heart failure, the doctor looks for any other health problems that may have led to heart failure. The results of your evaluation will help your doctor form a treatment plan.  Health history and physical exam  Your visit will start with a health history. Tell the doctor about any symptoms youve noticed and about all medicines you take. Then youll have a physical exam. This includes listening to your heartbeat and breathing. Youll also be checked for swelling (edema) in your legs and neck. When you have fluid buildup or fluid in the lungs, it may be called congestive heart failure.  Diagnosing heart failure     During an echocardiogram, sound waves bounce off the heart. These are converted into a picture on the screen.   The following may be done to help your doctor form a diagnosis:  · X-rays show the size and shape of your heart. These pictures can also show fluid in your lungs.  · An electrocardiogram (ECG or EKG) shows the pattern of your heartbeat. Small pads (electrodes) are placed on your chest, arms, and legs. Wires connect the pads to the ECG machine, which records your hearts electrical signals. This can give the doctor information about heart function.  · An echocardiogram uses ultrasound waves to show the structure and movement of your heart muscle. This shows how well the heart pumps. It also shows the thickness of the heart  walls, and if the heart is enlarged. It is one of the most useful, non-invasive tests as it provides information about the heart's general function. This helps your doctor make treatment decisions.  · Lab tests evaluate small amounts of blood or urine for signs of problems. A BNP lab test can help diagnose and evaluate heart failure. BNP stands for B-type natriuretic peptide. The ventricles secrete more BNP when heart failure worsens. Lab tests can also provide information about metabolic dysfunction or heart dysfunction.  Your treatment plan  Based on the results of your evaluation and tests, your doctor will develop a treatment plan. This plan is designed to relieve some of your heart failure symptoms and help make you more comfortable. Your treatment plan may include:  · Medicine to help your heart work better and improve your quality of life  · Changes in what you eat and drink to help prevent fluid from backing up in your body  · Daily monitoring of your weight and heart failure symptoms to see how well your treatment plan is working  · Exercise to help you stay healthy  · Help with quitting smoking  · Emotional and psychological support to help adjust to the changes  · Referrals to other specialists to make sure you are being treated comprehensively  Date Last Reviewed: 3/21/2016  © 0764-6363 [employer] [address] [place] [address], [place] [address]. All rights reserved. This information is not intended as a substitute for professional medical care. Always follow your healthcare professional's instructions.              Heart Failure: Making Changes to Your Diet  You have a condition called heart failure. When you have heart failure, excess fluid is more likely to build up in your body because your heart isn't working well. This makes the heart work harder to pump blood. Fluid buildup causes symptoms such as shortness of breath and swelling (edema). This is often referred to as congestive heart  failure or CHF. Controlling the amount of salt (sodium) you eat may help stop fluid from building up. Your doctor may also tell you to reduce the amount of fluid you drink.  Reading food labels    Your healthcare provider will tell you how much sodium you can eat each day. Read food labels to keep track. Keep in mind that certain foods are high in salt. These include canned, frozen, and processed foods. Check the amount of sodium in each serving. Watch out for high-sodium ingredients. These include MSG (monosodium glutamate), baking soda, and sodium phosphate.   Eating less salt  Give yourself time to get used to eating less salt. It may take a little while. Here are some tips to help:  · Take the saltshaker off the table. Replace it with salt-free herb mixes and spices.  · Eat fresh or plain frozen vegetables. These have much less salt than canned vegetables.  · Choose low-sodium snacks like sodium-free pretzels, crackers, or air-popped popcorn.  · Dont add salt to your food when youre cooking. Instead, season your foods with pepper, lemon, garlic, or onion.  · When you eat out, ask that your food be cooked without added salt.  · Avoid eating fried foods as these often have a great deal of salt.  If youre told to limit fluids  You may need to limit how much fluid you have to help prevent swelling. This includes anything that is liquid at room temperature, such as ice cream and soup. If your doctor tells you to limit fluid, try these tips:  · Measure drinks in a measuring cup before you drink them. This will help you meet daily goals.  · Chill drinks to make them more refreshing.  · Suck on frozen lemon wedges to quench thirst.  · Only drink when youre thirsty.  · Chew sugarless gum or suck on hard candy to keep your mouth moist.  · Weigh yourself daily to know if your body's fluid content is rising.  My sodium goal  Your healthcare provider may give you a sodium goal to meet each day. This includes sodium found  in food as well as salt that you add. My goal is to eat no more than ___________ mg of sodium per day.     When to call your doctor  Call your doctor right away if you have any symptoms of worsening heart failure. These can include:  · Sudden weight gain  · Increased swelling of your legs or ankles  · Trouble breathing when youre resting or at night  · Increase in the number of pillows you have to sleep on  · Chest pain, pressure, discomfort, or pain in the jaw, neck, or back   Date Last Reviewed: 3/21/2016  © 5758-7297 [employer]. [address] [place] [address], [place] [address]. All rights reserved. This information is not intended as a substitute for professional medical care. Always follow your healthcare professional's instructions.              Heart Failure: Medicines to Help Your Heart    You have a condition called heart failure (also known as congestive heart failure, or CHF). Your doctor will likely prescribe medicines for heart failure and any underlying health problems you have. Most heart failure patients take one or more types of medicinen. Your healthcare provider will work to find the combination of medicines that works best for you.  Heart failure medicines  Here are the most common heart failure medicines:  · ACE inhibitors lower blood pressure and decrease strain on the heart. This makes it easier for the heart to pump. Angiotensin receptor blockers have similar effects. These are prescribed for some patients instead of ACE inhibitors.  · Beta-blockers relieve stress on the heart. They also improve symptoms. They may also improve the heart's pumping action over time.  · Diuretics (also called water pills) help rid your body of excess water. This can help rid your body of swelling (edema). Having less fluid to pump means your heart doesnt have to work as hard. Some diuretics make your body lose a mineral called potassium. Your doctor will tell you if you need to take supplements or eat  more foods high in potassium.  · Digoxin helps your heart pump with more strength. This helps your heart pump more blood with each beat. So, more oxygen-rich blood travels to the rest of the body.  · Aldosterone antagonists help alter hormones and decrease strain on the heart.  · Hydralazine and nitrates are two separate medicines used together to treat heart failure. They may come in one combination pill. They lower blood pressure and decrease how hard the heart has to pump.  Medicines for related conditions  Controlling other heart problems helps keep heart failure under control, too. Depending on other heart problems you have, medicines may be prescribed to:  · Lower blood pressure (antihypertensives).  · Lower cholesterol levels (statins).  · Prevent blood clots (anticoagulants or aspirin).  · Keep the heartbeat steady (antiarrhythmics).  Date Last Reviewed: 3/5/2016  © 6855-0200 [employer]. [address] [place] [address]. All rights reserved. This information is not intended as a substitute for professional medical care. Always follow your healthcare professional's instructions.              Heart Failure: Procedures That May Help    The heart is a muscle that pumps oxygen-rich blood to all parts of the body. When you have heart failure, the heart is not able to pump as well as it should. Blood and fluid may back up into the lungs (congestive heart failure), and some parts of the body dont get enough oxygen-rich blood to work normally. These problems lead to the symptoms of heart failure.     Certain procedures may help the heart pump better in some cases of heart failure. Some procedures are done to treat health problems that may have caused the heart failure such as coronary artery disease or heart rhythm problems. For more serious heart failure, other options are available.  Treating artery and valve problems  If you have coronary artery disease or valve disease, procedures may  be done to improve blood flow. This helps the heart pump better, which can improve heart failure symptoms. First, your doctor may do a cardiac catheterization to help detect clogged blood vessels or valve damage. During this procedure, a  thin tube (catheter) in inserted into a blood vessel and guided to the heart. There a dye is injected and a special type of X-ray (angiogram) is taken of the blood vessels. Procedures to open a blocked artery or fix damaged valves can also be done using catheterization.  · Angioplasty uses a balloon-tipped instrument at the end of the catheter. The balloon is inflated to widen the narrowed artery. In many cases, a stent is expanded to further support the narrowed artery. A stent is a metal mesh tube.  · Valve surgery repairs or replacement of faulty valves can also be done during catheterization so blood can flow properly through the chambers of the heart.  Bypass surgery is another option to help treat blocked arteries. It uses a healthy blood vessel from elsewhere in the body. The healthy blood vessel is attached above and below the blocked area so that blood can flow around the blocked artery.  Treating heart rhythm problems  A device may be placed in the chest to help a weak heart maintain a healthy, heartbeat so the heart can pump more effectively:  · Pacemaker. A pacemaker is an implanted device that regulates your heartbeat electronically. It monitors your heart's rhythm and generates a painless electric impulse that helps the heart beat in a regular rhythm. A pacemaker is programmed to meet your specific heart rhythm needs.  · Biventricular pacing/cardiac resynchronization therapy. A type of pacemaker that paces both pumping chambers of the heart at the same time to coordinate contractions and to improve the heart's function. Some people with heart failure are candidates for this therapy.  · Implantable cardioverter defibrillator. A device similar to a pacemaker that senses  when the heart is beating too fast and delivers an electrical shock to convert the fast rhythm to a normal rhythm. This can be a life saving device.  In severe cases  In more serious cases of heart failure when other treatments no longer work, other options may include:  · Ventricular assist devices (VADs). These are mechanical devices used to take over the pumping function for one or both of the heart's ventricles, or pumping chambers. A VAD may be necessary when heart failure progresses to the point that medicines and other treatments no longer help. In some cases, a VAD may be used as a bridge to transplant.  · Heart transplant. This is replacing the diseased heart with a healthy one from a donor. This is an option for a few people who are very sick. A heart transplant is very serious and not an option for all patients. Your doctor can tell you more.  Date Last Reviewed: 3/20/2016  © 8305-6358 [employer]. [address] [place] [address]. All rights reserved. This information is not intended as a substitute for professional medical care. Always follow your healthcare professional's instructions.              Heart Failure: Tracking Your Weight  You have a condition called heart failure. When you have heart failure, a sudden weight gain or a steady rise in weight is a warning sign that your body is retaining too much water and salt. This could mean your heart failure is getting worse. If left untreated, it can cause problems for your lungs and result in shortness of breath. Weighing yourself each day is the best way to know if youre retaining water. If your weight goes up quickly, call your doctor. You will be given instructions on how to get rid of the excess water. You will likely need medicines and to avoid salt. This will help your heart work better.  Call your doctor if you gain more than 2 pounds in 1 day, more than 5 pounds in 1 week, or whatever weight gain you were told to report  by your doctor. This is often a sign of worsening heart failure and needs to be evaluated and treated. Your doctor will tell you what to do next.   Tips for weighing yourself    · Weigh yourself at the same time each morning, wearing the same clothes. Weigh yourself after urinating and before eating.  · Use the same scale each day. Make sure the numbers are easy to read. Put the scale on a flat, hard surface -- not on a rug or carpet.  · Do not stop weighing yourself. If you forget one day, weigh again the next morning.  How to use your weight chart  · Keep your weight chart near the scale. Write your weight on the chart as soon as you get off the scale.  · Fill in the month and the start date on the chart. Then write down your weight each day. Your chart will look like this:    · If you miss a day, leave the space blank. Weigh yourself the next day and write your weight in the next space.  · Take your weight chart with you when you go to see your doctor.  Date Last Reviewed: 3/20/2016  © 3736-5366 [employer]. [address] [place] [address]. All rights reserved. This information is not intended as a substitute for professional medical care. Always follow your healthcare professional's instructions.              Heart Failure: Warning Signs of a Flare-Up  You have a condition called heart failure. Once you have heart failure, flare-ups can happen. Below are signs that can mean your heart failure is getting worse. If you notice any of these warning signs, call your healthcare provider.  Swelling    · Your feet, ankles, or lower legs get puffier.  · You notice skin changes on your lower legs.  · Your shoes feel too tight.  · Your clothes are tighter in the waist.  · You have trouble getting rings on or off your fingers.  Shortness of breath  · You have to breathe harder even when youre doing your normal activities or when youre resting.  · You are short of breath walking up stairs or even  short distances.  · You wake up at night short of breath or coughing.  · You need to use more pillows or sit up to sleep.  · You wake up tired or restless.  Other warning signs  · You feel weaker, dizzy, or more tired.  · You have chest pain or changes in your heartbeat.  · You have a cough that wont go away.  · You cant remember things or dont feel like eating.  Tracking your weight  Gaining weight is often the first warning sign that heart failure is getting worse. Gaining even a few pounds can be a sign that your body is retaining excess water and salt. Weighing yourself each day in the morning after you urinate and before you eat, is the best way to know if you're retaining water. Get a scale that is easy to read and make sure you wear the same clothes and use the same scale every time you weigh. Your healthcare provider will show you how to track your weight. Call your doctor if you gain more than 2 pounds in 1 day, 5 pounds in 1 week, or whatever weight gain you were told to report by your doctor. This is often a sign of worsening heart failure and needs to be evaluated and treated before it compromises your breathing. Your doctor will tell you what to do next.    Date Last Reviewed: 3/15/2016  © 3509-0148 [employer]. [address] [place] [address]. All rights reserved. This information is not intended as a substitute for professional medical care. Always follow your healthcare professional's instructions.              Coumadin Discharge Instructions                         Chronic [facility] Disease Education             [facility] Sign-Up     Activating your [facility] account is as easy as 1-2-3!     1) Visit [email or web address], select Sign Up Now, enter this activation code and your date of birth, then select Next.  [email or web address]  Expires: 4/3/2017 10:46 AM      2) Create a username and password to use when you visit [facility] in the future and select a security question in case you  lose your password and select Next.    3) Enter your e-mail address and click Sign Up!    Additional Information  If you have questions, please e-mail [email or web address] or call [phone] to talk to our [facility] staff. Remember, [facility] is NOT to be used for urgent needs. For medical emergencies, dial 911.          [facility] complies with applicable Federal civil rights laws and does not discriminate on the basis of race, color, national origin, age, disability, or sex.

## 2017-03-09 NOTE — CONSULTS
Electrophysiology Consult Note    3/9/2017    SUBJECTIVE  Isidro White is a 47 y.o. male. EP is consulted for wide complex tachycardia    47 year old male patient with a history of recent aortic valve replacement (2/8/17, 22 mm mechanical, indication: mod/severe aortic stenosis and regurgitation), tricuspid valve repair (28 mm annuloplasty), redo sternotomy, possible ASD closure as a child, ESRD on HD for the past 6 weeks, pulmonary HTN, HTN. The patient was last discharged from the CV Surgery service on 2/22/16.   Patient reports that he has been having intermittent palpitations for over 2- 3 months. He was admitted in January at [facility] with palpitations and was found to b ehaving SVT. Was given high doses of beta blockers and calcium channel blockers( Coreg/Verapamil). He returned to the ED after few days and had severe bradycardia/CHB. He was taken off calcium channel blockers and put on labetalol and clonidine in addition to hydralazine which he was on for HTN. Post surgery ( AVR/TV repair) he complains of increasing palpitations, epistaxis, fatigue. He reports- I have still not recovered from Surgery. In addition he was recently started on HD  A week or 2 before AVR surgery. He has highly fluctuating blood pressure during and after dialysis.The patient reports that he has not felt any better since he was discharged from the hospital after his surgery. He has been having very frequent episodes of tachycardia with HR upto 170-180s. These tachycardia episodes are associated with chest tightness, SOB, dizziness. Never passed out-syncope. He has been having intermittent epistaxis, mouth bleeding and bleeding from ears. The patient has had several ED visits. His bleeding was attributed to supratherapeutic INR (5.0). He was given vitamin K which brought his INR to 1. The patient reports having pain at the surgical incision sites, especially when he takes a deep breath. His blood pressure has  been labile. His BP dropped during HD yesterday and he was pre-syncopal.     He presented to [facility] ED earlier this morning at 2 AM with palpitations/tachycardia, chest tightness and he was found to have a wide complex tachycardia. He was administered verapamil which cardioverted him to sinus tachycardia. EP is consulted fir further recommendations.?    OBJECTIVE  Current Facility-Administered Medications   Medication Dose Route Frequency Provider Last Rate Last Dose    0.9%  NaCl infusion (for blood administration)   Intravenous Q24H PRN Gavin Carroll MD        acetaminophen tablet 650 mg  650 mg Oral Q8H PRN Gavin Carroll MD        aspirin EC tablet 81 mg  81 mg Oral Daily Gavin Carroll MD   81 mg at 03/09/17 1458    cloNIDine tablet 0.1 mg  0.1 mg Oral BID Gavin Carroll MD        heparin 25,000 units in dextrose 5% 250 mL (100 units/mL) infusion  17 Units/kg/hr Intravenous Continuous Kika Knox NP 12.7 mL/hr at 03/09/17 1225 17 Units/kg/hr at 03/09/17 1225    hydrALAZINE tablet 100 mg  100 mg Oral Q8H Gavin Carroll MD   100 mg at 03/09/17 1457    labetalol tablet 300 mg  300 mg Oral Q12H Stacy Flynn NP   300 mg at 03/09/17 1457    ondansetron disintegrating tablet 8 mg  8 mg Oral Q8H PRN Stayc Flynn NP        ondansetron injection 4 mg  4 mg Intravenous Q6H PRN Gavin Carroll MD        oxycodone immediate release tablet 10 mg  10 mg Oral Q4H PRN Kika Knox NP        pantoprazole EC tablet 40 mg  40 mg Oral Daily Gavin Carroll MD   40 mg at 03/09/17 1458    polyethylene glycol packet 17 g  17 g Oral Daily PRN Gavin Carroll MD        sevelamer carbonate tablet 800 mg  800 mg Oral TID  Gavin Carroll MD        sildenafil tablet 20 mg  20 mg Oral TID Stacy Flynn NP   20 mg at 03/09/17 1458    sodium chloride 0.9% flush 3 mL  3 mL Intravenous Q8H Gavin Carroll MD        tramadol tablet 50 mg  50 mg Oral Q6H  JORGE Knox NP         Current Outpatient Prescriptions   Medication Sig Dispense Refill    ascorbic acid, vitamin C, (VITAMIN C) 500 MG tablet Take 1 tablet (500 mg total) by mouth 2 (two) times daily.      aspirin (ECOTRIN) 81 MG EC tablet Take 1 tablet (81 mg total) by mouth once daily.  0    cloNIDine (CATAPRES) 0.1 MG tablet Take 1 tablet (0.1 mg total) by mouth 2 (two) times daily. 60 tablet 11    docusate sodium (COLACE) 100 MG capsule Take 1 capsule (100 mg total) by mouth 2 (two) times daily.  0    hydrALAZINE (APRESOLINE) 100 MG tablet TAKE ONE TABLET BY MOUTH THREE TIMES DAILY FOR  HYPERTENSION 90 tablet 1    labetalol (NORMODYNE) 300 MG tablet Take 1 tablet (300 mg total) by mouth every 12 (twelve) hours. 60 tablet 1    omega-3 fatty acids-vitamin E (FISH OIL) 1,000 mg Cap Take 1 capsule by mouth once daily.  0    oxycodone (ROXICODONE) 5 MG immediate release tablet Take 1 tablet (5 mg total) by mouth every 4 (four) hours as needed. 60 tablet 0    sevelamer carbonate (RENVELA) 800 mg Tab Take 1 tablet (800 mg total) by mouth 3 (three) times daily with meals. 90 tablet 11    sildenafil (REVATIO) 20 mg Tab Take 1 tablet (20 mg total) by mouth 3 (three) times daily. 90 tablet 11    warfarin (COUMADIN) 7.5 MG tablet Take 1 tablet (7.5 mg total) by mouth Daily. 30 tablet 11     Review of Systems   Constitution: Positive for weakness. Negative for chills and fever.   HENT: Positive for nosebleeds.   Cardiovascular: Positive for chest pain, dyspnea on exertion, near-syncope, orthopnea and palpitations. Negative for irregular heartbeat, leg swelling, paroxysmal nocturnal dyspnea and syncope.   Respiratory: Positive for shortness of breath. Negative for sleep disturbances due to breathing.   Hematologic/Lymphatic: Positive for bleeding problem.   Gastrointestinal: Negative for hematemesis and melena.   Neurological: Negative for dizziness and focal weakness.   Psychiatric/Behavioral:  "Negative for altered mental status.    Past Medical History:   Diagnosis Date    CKD (chronic kidney disease) stage 3, GFR 30-59 ml/min     Hypertension     Tachycardia        Past Surgical History:   Procedure Laterality Date    CARDIAC SURGERY      aortic valve replacement       Review of patient's allergies indicates:  No Known Allergies    History reviewed. No pertinent family history.    Social History     Social History    Marital status: Single     Spouse name: N/A    Number of children: N/A    Years of education: N/A     Social History Main Topics    Smoking status: Never Smoker    Smokeless tobacco: None    Alcohol use No      Comment: no longer drinks since surgery    Drug use: No    Sexual activity: Not Asked     Other Topics Concern    None     Social History Narrative       Vitals: BP (!) 164/95  Pulse 110  Temp 99.1 °F (37.3 °C) (Oral)   Resp (!) 26  Ht 6' 2" (1.88 m)  Wt 74.8 kg (165 lb)  SpO2 100%  BMI 21.18 kg/m2    Physical Exam   Constitutional: He is oriented to person, place, and time. No distress.   HENT: Head: Normocephalic and atraumatic.   Eyes: No scleral icterus.   Neck: No JVD present.   Cardiovascular:   Tachycardic, mechanical 2nd heard sound.   Pulmonary/Chest: He has no wheezes.   Midline scar and right upper chest dialysis catheter in place.   Abdominal: There is no tenderness. There is no rebound.   Musculoskeletal:   Trace lower extremity edema   Neurological: He is alert and oriented to person, place, and time.   Skin: Skin is warm and dry. He is not diaphoretic.   Psychiatric: He has a normal mood and affect. His behavior is normal.     Labs  CMP:  Recent Labs  Lab 03/09/17  0928      K 3.5   CO2 29   CL 98   BUN 28*   CREATININE 4.5*   GLU 93   CALCIUM 8.6*   ALT 23   AST 16   ALKPHOS 100   BILITOT 0.6   PROT 7.7   ALBUMIN 2.0*       CBC:  Recent Labs  Lab 03/09/17  0928   WBC 7.73   HGB 6.6*   HCT 20.9*      MCV 95     Coags:  Recent Labs  Lab " 03/09/17 0928   APTT 29.1   INR 1.7*     Cardiac markers:  Recent Labs  Lab 03/09/17 0928 03/09/17  1215   TROPONINI 0.115* 0.135*      Telemetry  Sinus tachycardia    EKG from [facility] ( pre AVR period) reviewed. Ranges from having SVT to sinus bradycardia- first degree AV block. There was a mention of complete AV block during one of the admissions when he was on coreg/verapamil.  Differentials include SVT with abberancy, fascicular VT( responded to verapamil in OSH ED)  ?  ASSESSMENT AND PLAN  47 y.o. male with multiple co morbidities transferred for further evaluation of wide complex tachycardia. Currently in sinus tachycardia. One of the diffrentials include VT- fascicular VT- verapamil sensitive although there was no AV disassociation, nor capture beats/fusion beats noticed. Hence recommend EP study when stable from Anemia standpoint. He has Pulm HTN and to be dialyzed tomorrow in AM.  Patient currently refuses any invasive therapies at this time.     Recomendations:  Consider EP study for SVT/VT evaluation and ablation procedure. However patient not inclined at this time.  Agree with transfusion. Severely anemia at Hb 6.6  HD in AM.   Repeat 2 D Echo with CFD in AM( no echo done since AVR and TVRepair). On review of [facility] at [facility] pre AVR mentions of reduced LVEF but the one done in [facility] 2 days pre surgery shows preserved EF.  Rate control with increasing labetaol.   NPO after midnight in case patient reconsiders his decision and EP study/RFA    Discussed with Cardiology Attending: MD Emmett Apple MD  [phone]

## 2017-03-09 NOTE — ASSESSMENT & PLAN NOTE
-nutritional consult for dietary eduction in AM, needs teaching regarding renal diet and coumadin diet as well as appropriate fluid restriction of 1500 mL/day

## 2017-03-09 NOTE — ASSESSMENT & PLAN NOTE
-SVT per EKG from OSH, now in sinus tach with occasional PAC's  -likely due to significant anemia and volume depletion, dialyzed yesterday and has been restricting himself to 32 ounces of fluid per day, PE demonstrates dry ashy skin and pruning of lower extremities  -will transfuse with two units of PRBC's, and have sent Iron/TIBC as well as hemolysis studies  -restarted home medications including labetalol 300 mg PO BID as well as clonidine 0.1 mg PO BID and hydralazine 100 mg TID  -on tele, will monitor for improvement with volume replacement and oral meds

## 2017-03-09 NOTE — ASSESSMENT & PLAN NOTE
-breath sounds diminished in left base and absent in right midde/lower lobe, with reports of significant pain with deep breaths, he sleeps on left side as this is more comfortable for him  -pulmonology consulted regarding bilateral subpleural effusions noted on CXR, will await recommendations/optimal timing regarding for thoracocentesis as patient is below 2 on INR and on heparin gtt currently  -follow up CXR as needed for shortness of breath or post procedure

## 2017-03-09 NOTE — NURSING
Received to unit accompanied by RN MINAOX3. TELE ST,  RA PULSE OX 91% 02 APPLIED AT 2L/NC. ORIENTED TO ENVIRONMENT, VERBALIZE UNDERSTANDING BED LOW AND LOCKED CALL SALAZAR INREA. DR. KAPOOR NOTIFIED OF PT'S ARRIVAL. BOBBY CONTINUE TO MONITOR.

## 2017-03-10 ENCOUNTER — ANESTHESIA EVENT (OUTPATIENT)
Dept: MEDSURG UNIT | Facility: HOSPITAL | Age: 47
DRG: 273 | End: 2017-03-10
Payer: MEDICAID

## 2017-03-10 PROBLEM — I50.41 ACUTE COMBINED SYSTOLIC AND DIASTOLIC HEART FAILURE: Status: ACTIVE | Noted: 2017-03-10

## 2017-03-10 PROBLEM — D63.8 ANEMIA OF CHRONIC DISEASE: Status: ACTIVE | Noted: 2017-03-10

## 2017-03-10 LAB
ANION GAP SERPL CALC-SCNC: 9 MMOL/L
AORTIC VALVE REGURGITATION: ABNORMAL
BASOPHILS # BLD AUTO: 0.03 K/UL
BASOPHILS NFR BLD: 0.4 %
BUN SERPL-MCNC: 34 MG/DL
CALCIUM SERPL-MCNC: 8.5 MG/DL
CHLORIDE SERPL-SCNC: 99 MMOL/L
CO2 SERPL-SCNC: 28 MMOL/L
CREAT SERPL-MCNC: 5.3 MG/DL
DIFFERENTIAL METHOD: ABNORMAL
EOSINOPHIL # BLD AUTO: 0.5 K/UL
EOSINOPHIL NFR BLD: 6.1 %
ERYTHROCYTE [DISTWIDTH] IN BLOOD BY AUTOMATED COUNT: 17.6 %
EST. GFR  (AFRICAN AMERICAN): 13.8 ML/MIN/1.73 M^2
EST. GFR  (NON AFRICAN AMERICAN): 12 ML/MIN/1.73 M^2
ESTIMATED PA SYSTOLIC PRESSURE: 53.44
FACT X PPP CHRO-ACNC: 0.2 IU/ML
FACT X PPP CHRO-ACNC: 0.21 IU/ML
FACT X PPP CHRO-ACNC: 0.27 IU/ML
FERRITIN SERPL-MCNC: 1201 NG/ML
GLOBAL PERICARDIAL EFFUSION: ABNORMAL
GLUCOSE SERPL-MCNC: 80 MG/DL
HCT VFR BLD AUTO: 25 %
HGB BLD-MCNC: 8.3 G/DL
INR PPP: 1.9
IRON SERPL-MCNC: 30 UG/DL
LYMPHOCYTES # BLD AUTO: 1.2 K/UL
LYMPHOCYTES NFR BLD: 15.2 %
MAGNESIUM SERPL-MCNC: 1.6 MG/DL
MCH RBC QN AUTO: 30 PG
MCHC RBC AUTO-ENTMCNC: 33.2 %
MCV RBC AUTO: 90 FL
MITRAL VALVE MOBILITY: NORMAL
MONOCYTES # BLD AUTO: 0.6 K/UL
MONOCYTES NFR BLD: 8.2 %
NEUTROPHILS # BLD AUTO: 5.4 K/UL
NEUTROPHILS NFR BLD: 70 %
PHOSPHATE SERPL-MCNC: 3.8 MG/DL
PLATELET # BLD AUTO: 257 K/UL
PMV BLD AUTO: 9.2 FL
POTASSIUM SERPL-SCNC: 3.5 MMOL/L
PROTHROMBIN TIME: 18.9 SEC
RBC # BLD AUTO: 2.77 M/UL
RETICS/RBC NFR AUTO: 2.3 %
RETIRED EF AND QEF - SEE NOTES: 33 (ref 55–65)
SATURATED IRON: 19 %
SODIUM SERPL-SCNC: 136 MMOL/L
TOTAL IRON BINDING CAPACITY: 161 UG/DL
TRANSFERRIN SERPL-MCNC: 109 MG/DL
TRICUSPID VALVE REGURGITATION: ABNORMAL
WBC # BLD AUTO: 7.69 K/UL

## 2017-03-10 PROCEDURE — 0W993ZZ DRAINAGE OF RIGHT PLEURAL CAVITY, PERCUTANEOUS APPROACH: ICD-10-PCS | Performed by: INTERNAL MEDICINE

## 2017-03-10 PROCEDURE — 80048 BASIC METABOLIC PNL TOTAL CA: CPT

## 2017-03-10 PROCEDURE — 89051 BODY FLUID CELL COUNT: CPT

## 2017-03-10 PROCEDURE — 25000003 PHARM REV CODE 250: Performed by: NURSE PRACTITIONER

## 2017-03-10 PROCEDURE — 99232 SBSQ HOSP IP/OBS MODERATE 35: CPT | Mod: ,,, | Performed by: INTERNAL MEDICINE

## 2017-03-10 PROCEDURE — 87086 URINE CULTURE/COLONY COUNT: CPT

## 2017-03-10 PROCEDURE — 93306 TTE W/DOPPLER COMPLETE: CPT

## 2017-03-10 PROCEDURE — 83735 ASSAY OF MAGNESIUM: CPT

## 2017-03-10 PROCEDURE — 82945 GLUCOSE OTHER FLUID: CPT

## 2017-03-10 PROCEDURE — 63600175 PHARM REV CODE 636 W HCPCS: Performed by: NURSE PRACTITIONER

## 2017-03-10 PROCEDURE — 0W993ZX DRAINAGE OF RIGHT PLEURAL CAVITY, PERCUTANEOUS APPROACH, DIAGNOSTIC: ICD-10-PCS | Performed by: INTERNAL MEDICINE

## 2017-03-10 PROCEDURE — 27000207 HC ISOLATION

## 2017-03-10 PROCEDURE — 88112 CYTOPATH CELL ENHANCE TECH: CPT | Mod: 26,,, | Performed by: PATHOLOGY

## 2017-03-10 PROCEDURE — 96372 THER/PROPH/DIAG INJ SC/IM: CPT

## 2017-03-10 PROCEDURE — 63600175 PHARM REV CODE 636 W HCPCS: Performed by: INTERNAL MEDICINE

## 2017-03-10 PROCEDURE — 25000003 PHARM REV CODE 250: Performed by: EMERGENCY MEDICINE

## 2017-03-10 PROCEDURE — 84100 ASSAY OF PHOSPHORUS: CPT

## 2017-03-10 PROCEDURE — 99233 SBSQ HOSP IP/OBS HIGH 50: CPT | Mod: ,,, | Performed by: NURSE PRACTITIONER

## 2017-03-10 PROCEDURE — 82728 ASSAY OF FERRITIN: CPT

## 2017-03-10 PROCEDURE — 84157 ASSAY OF PROTEIN OTHER: CPT

## 2017-03-10 PROCEDURE — 36415 COLL VENOUS BLD VENIPUNCTURE: CPT

## 2017-03-10 PROCEDURE — 87088 URINE BACTERIA CULTURE: CPT

## 2017-03-10 PROCEDURE — 85045 AUTOMATED RETICULOCYTE COUNT: CPT

## 2017-03-10 PROCEDURE — 83540 ASSAY OF IRON: CPT

## 2017-03-10 PROCEDURE — 87070 CULTURE OTHR SPECIMN AEROBIC: CPT

## 2017-03-10 PROCEDURE — 20600001 HC STEP DOWN PRIVATE ROOM

## 2017-03-10 PROCEDURE — 93306 TTE W/DOPPLER COMPLETE: CPT | Mod: 26,,, | Performed by: INTERNAL MEDICINE

## 2017-03-10 PROCEDURE — 85025 COMPLETE CBC W/AUTO DIFF WBC: CPT

## 2017-03-10 PROCEDURE — 25000003 PHARM REV CODE 250: Performed by: INTERNAL MEDICINE

## 2017-03-10 PROCEDURE — 85610 PROTHROMBIN TIME: CPT

## 2017-03-10 PROCEDURE — 87186 SC STD MICRODIL/AGAR DIL: CPT

## 2017-03-10 PROCEDURE — 87077 CULTURE AEROBIC IDENTIFY: CPT

## 2017-03-10 PROCEDURE — 80100016 HC MAINTENANCE HEMODIALYSIS

## 2017-03-10 PROCEDURE — 88305 TISSUE EXAM BY PATHOLOGIST: CPT | Performed by: PATHOLOGY

## 2017-03-10 PROCEDURE — 85520 HEPARIN ASSAY: CPT | Mod: 91

## 2017-03-10 PROCEDURE — 82150 ASSAY OF AMYLASE: CPT

## 2017-03-10 PROCEDURE — 83615 LACTATE (LD) (LDH) ENZYME: CPT

## 2017-03-10 RX ORDER — AMLODIPINE BESYLATE 5 MG/1
5 TABLET ORAL DAILY
Status: DISCONTINUED | OUTPATIENT
Start: 2017-03-10 | End: 2017-03-10

## 2017-03-10 RX ORDER — SODIUM CHLORIDE 9 MG/ML
INJECTION, SOLUTION INTRAVENOUS
Status: DISCONTINUED | OUTPATIENT
Start: 2017-03-10 | End: 2017-03-12

## 2017-03-10 RX ORDER — AMLODIPINE BESYLATE 10 MG/1
10 TABLET ORAL DAILY
Status: DISCONTINUED | OUTPATIENT
Start: 2017-03-11 | End: 2017-03-11

## 2017-03-10 RX ORDER — SODIUM CHLORIDE 9 MG/ML
INJECTION, SOLUTION INTRAVENOUS
Status: DISCONTINUED | OUTPATIENT
Start: 2017-03-11 | End: 2017-03-12

## 2017-03-10 RX ORDER — LABETALOL 100 MG/1
400 TABLET, FILM COATED ORAL EVERY 12 HOURS
Status: DISCONTINUED | OUTPATIENT
Start: 2017-03-10 | End: 2017-03-10

## 2017-03-10 RX ORDER — POTASSIUM CHLORIDE 20 MEQ/1
20 TABLET, EXTENDED RELEASE ORAL ONCE
Status: COMPLETED | OUTPATIENT
Start: 2017-03-10 | End: 2017-03-10

## 2017-03-10 RX ORDER — AMLODIPINE BESYLATE 10 MG/1
10 TABLET ORAL DAILY
Status: DISCONTINUED | OUTPATIENT
Start: 2017-03-10 | End: 2017-03-10

## 2017-03-10 RX ORDER — FUROSEMIDE 10 MG/ML
80 INJECTION INTRAMUSCULAR; INTRAVENOUS ONCE
Status: DISCONTINUED | OUTPATIENT
Start: 2017-03-10 | End: 2017-03-10

## 2017-03-10 RX ORDER — SODIUM CHLORIDE 9 MG/ML
INJECTION, SOLUTION INTRAVENOUS ONCE
Status: DISCONTINUED | OUTPATIENT
Start: 2017-03-10 | End: 2017-03-12

## 2017-03-10 RX ORDER — FUROSEMIDE 10 MG/ML
120 INJECTION INTRAMUSCULAR; INTRAVENOUS ONCE
Status: COMPLETED | OUTPATIENT
Start: 2017-03-10 | End: 2017-03-10

## 2017-03-10 RX ORDER — LISINOPRIL 5 MG/1
5 TABLET ORAL DAILY
Status: DISCONTINUED | OUTPATIENT
Start: 2017-03-10 | End: 2017-03-11

## 2017-03-10 RX ORDER — LABETALOL 100 MG/1
400 TABLET, FILM COATED ORAL 3 TIMES DAILY
Status: DISCONTINUED | OUTPATIENT
Start: 2017-03-10 | End: 2017-03-11

## 2017-03-10 RX ORDER — SODIUM CHLORIDE 9 MG/ML
INJECTION, SOLUTION INTRAVENOUS ONCE
Status: DISCONTINUED | OUTPATIENT
Start: 2017-03-11 | End: 2017-03-12

## 2017-03-10 RX ORDER — WARFARIN 2.5 MG/1
2.5 TABLET ORAL DAILY
Status: DISCONTINUED | OUTPATIENT
Start: 2017-03-10 | End: 2017-03-13

## 2017-03-10 RX ORDER — HEPARIN SODIUM 1000 [USP'U]/ML
1000 INJECTION, SOLUTION INTRAVENOUS; SUBCUTANEOUS
Status: DISCONTINUED | OUTPATIENT
Start: 2017-03-10 | End: 2017-03-19 | Stop reason: HOSPADM

## 2017-03-10 RX ADMIN — HEPARIN SODIUM AND DEXTROSE 21 UNITS/KG/HR: 10000; 5 INJECTION INTRAVENOUS at 12:03

## 2017-03-10 RX ADMIN — ERYTHROPOIETIN 10000 UNITS: 10000 INJECTION, SOLUTION INTRAVENOUS; SUBCUTANEOUS at 12:03

## 2017-03-10 RX ADMIN — SILDENAFIL 20 MG: 20 TABLET ORAL at 09:03

## 2017-03-10 RX ADMIN — ASPIRIN 81 MG: 81 TABLET, COATED ORAL at 02:03

## 2017-03-10 RX ADMIN — OXYCODONE HYDROCHLORIDE 10 MG: 5 TABLET ORAL at 08:03

## 2017-03-10 RX ADMIN — WARFARIN SODIUM 2.5 MG: 2.5 TABLET ORAL at 05:03

## 2017-03-10 RX ADMIN — LISINOPRIL 5 MG: 5 TABLET ORAL at 06:03

## 2017-03-10 RX ADMIN — LABETALOL HYDROCHLORIDE 400 MG: 100 TABLET, FILM COATED ORAL at 08:03

## 2017-03-10 RX ADMIN — LABETALOL HCL 400 MG: 100 TABLET, FILM COATED ORAL at 03:03

## 2017-03-10 RX ADMIN — HYDRALAZINE HYDROCHLORIDE 100 MG: 50 TABLET ORAL at 02:03

## 2017-03-10 RX ADMIN — HYDRALAZINE HYDROCHLORIDE 100 MG: 50 TABLET ORAL at 09:03

## 2017-03-10 RX ADMIN — SILDENAFIL 20 MG: 20 TABLET ORAL at 02:03

## 2017-03-10 RX ADMIN — HEPARIN SODIUM 1000 UNITS: 1000 INJECTION, SOLUTION INTRAVENOUS; SUBCUTANEOUS at 01:03

## 2017-03-10 RX ADMIN — HYDRALAZINE HYDROCHLORIDE 100 MG: 50 TABLET ORAL at 05:03

## 2017-03-10 RX ADMIN — PANTOPRAZOLE SODIUM 40 MG: 40 TABLET, DELAYED RELEASE ORAL at 02:03

## 2017-03-10 RX ADMIN — CLONIDINE HYDROCHLORIDE 0.1 MG: 0.1 TABLET ORAL at 09:03

## 2017-03-10 RX ADMIN — HEPARIN SODIUM AND DEXTROSE 23 UNITS/KG/HR: 10000; 5 INJECTION INTRAVENOUS at 11:03

## 2017-03-10 RX ADMIN — POLYETHYLENE GLYCOL 3350 17 G: 17 POWDER, FOR SOLUTION ORAL at 02:03

## 2017-03-10 RX ADMIN — OXYCODONE HYDROCHLORIDE 10 MG: 5 TABLET ORAL at 03:03

## 2017-03-10 RX ADMIN — MAGNESIUM SULFATE HEPTAHYDRATE 1 G: 500 INJECTION, SOLUTION INTRAMUSCULAR; INTRAVENOUS at 03:03

## 2017-03-10 RX ADMIN — SEVELAMER CARBONATE 800 MG: 800 TABLET, FILM COATED ORAL at 02:03

## 2017-03-10 RX ADMIN — SILDENAFIL 20 MG: 20 TABLET ORAL at 05:03

## 2017-03-10 RX ADMIN — LABETALOL HCL 400 MG: 100 TABLET, FILM COATED ORAL at 09:03

## 2017-03-10 RX ADMIN — Medication 3 ML: at 02:03

## 2017-03-10 RX ADMIN — FUROSEMIDE 120 MG: 10 INJECTION, SOLUTION INTRAMUSCULAR; INTRAVENOUS at 02:03

## 2017-03-10 RX ADMIN — OXYCODONE HYDROCHLORIDE 10 MG: 5 TABLET ORAL at 11:03

## 2017-03-10 RX ADMIN — AMLODIPINE BESYLATE 5 MG: 5 TABLET ORAL at 02:03

## 2017-03-10 RX ADMIN — POTASSIUM CHLORIDE 20 MEQ: 1500 TABLET, EXTENDED RELEASE ORAL at 03:03

## 2017-03-10 RX ADMIN — SEVELAMER CARBONATE 800 MG: 800 TABLET, FILM COATED ORAL at 07:03

## 2017-03-10 NOTE — ASSESSMENT & PLAN NOTE
-on heparin gtt for sub therapeutic INR of 1.9 today, will restart warfarin this PM, goal 2-3 (AVR 2/8/2017)  -after discussion with clinical pharm will start dose of 2.5 mg warfarin tonight, assess INR tomorrow, may need to increase dose to 5 mg, he had a recent supra therapeutic INR of 5 with oral and nasal bleeding

## 2017-03-10 NOTE — PLAN OF CARE
Problem: Patient Care Overview  Goal: Plan of Care Review  Outcome: Ongoing (interventions implemented as appropriate)  Patient tolerated HD well. 3115 removed; no blood pressure drops noted. Catheter flushed with ns and Heparin 1000 units/ml instilled to fill volume. Capped and taped. Patient without complaint. HR 90's-100's throughout the treatment

## 2017-03-10 NOTE — PROGRESS NOTES
Patient with ESRD on HD scheduled M/W/F OP at [facility] in [place], [place]. Pt is not eligible for enrollment in [facility] HF Program.    Removed from suspect list.

## 2017-03-10 NOTE — NURSING
Off floor to hemodialysis, aaox4, heparin maintained at 21 unit/h via pump. Ambulated to stretcher without difficulty.

## 2017-03-10 NOTE — PROCEDURES
"Isidro White is a 47 y.o. male patient.    Temp: 98 °F (36.7 °C) (03/10/17 1309)  Pulse: (!) 112 (03/10/17 1500)  Resp: 18 (03/10/17 1346)  BP: (!) 159/95 (03/10/17 1346)  SpO2: 99 % (03/10/17 1346)  Weight: 80.8 kg (178 lb 2.1 oz) (03/10/17 0516)  Height: 6' 2" (188 cm) (03/09/17 1638)       Thoracentesis  Date/Time: 3/10/2017 5:08 PM  Performed by: RUBY PAIGE  Authorized by: RUBY PAIGE   Consent Done: Yes  Consent: Written consent obtained.  Risks and benefits: risks, benefits and alternatives were discussed  Consent given by: patient  Time out: Immediately prior to procedure a "time out" was called to verify the correct patient, procedure, equipment, support staff and site/side marked as required.  Procedure purpose: diagnostic and therapeutic  Indications: pleural effusion  Preparation: Patient was prepped and draped in the usual sterile fashion.  Local anesthesia used: yes    Anesthesia:  Local anesthesia used: yes  Preparation: skin prepped with ChloraPrep  Patient position: sitting  Ultrasound guidance: yes  Location: right posterior  Number of attempts: 1  Drainage amount: 1500 ml  Drainage characteristics: serosanguinous and bloody  Patient tolerance: Patient tolerated the procedure well with no immediate complications  Complications: No          Ruby Paige  3/10/2017  "

## 2017-03-10 NOTE — ASSESSMENT & PLAN NOTE
-dialysis today, nephrology following and request one time dose of lasix 120 mg IVP to aid in diuresis, has bilateral pleural effusions, pulmonology feels diuresis versus thoracentesis  -K 3.5 and Mag 1.6, will replete with mag 1 g IVP and potassium chloride 20 mEq PO, recheck electrolytes in AM

## 2017-03-10 NOTE — CONSULTS
Consult received re: coumadin and renal diet education. Pt not in room during visit. RD left Renal diet and Coumadin/Vitamin K handouts at bedside. Will follow-up.

## 2017-03-10 NOTE — ASSESSMENT & PLAN NOTE
-nutritional referral and dietary consult in  for dietary eduction in AM, needs teaching regarding renal diet and coumadin diet as well as appropriate fluid restriction of 1500 mL/day  -pre albumin 13 and albumin 2  -novasource renal supplements ordered

## 2017-03-10 NOTE — PROGRESS NOTES
"[facility] Hospital Medicine  Progress Note    Patient Name: Isidro White  MRN: [id number]  Patient Class: IP- Inpatient   Admission Date: 3/9/2017  Length of Stay: 1 days  Attending Physician: Jose Bond MD  Primary Care Provider: Provider [facility] Medicine Team: [facility] MED J Stacy Flynn NP    Subjective:     Principal Problem:SVT (supraventricular tachycardia)    HPI:  Mr. White is a 46 yo AAM who has PMH of AV replacement and TV repair on February 8, 2017, ASD closure at age 8, ESRD on HD M/W/F, pulmonary hypertension, and HTN. He presented to the ED from [facility] after being seen for SVT and given verapamil. On arrival to the ED he is noted to be in sinus tach around 105-110. He reports a similar episode earlier in the week 3/7/2017 where he was seen at [facility] for what he describes as a fast then slowing then fast then slowing heart rate, he was treated with coreg and sent home. This morning around 0200/0300 he was awoken by the same fast then slowing then fast again heart rate, he asked for assistance from his brother and they checked his blood pressure and heart rate which they report as being heart rate 179, and . Thus prompting his visit to the hospital today. He denies stabbing chest pain, but does however report chest tightness and shortness of breath. He denies any radiation of the midsternal chest tightness. He denies headache, cough, fever, chills, night sweats, N/V/D. He reports strict adherence to renal diet and "32 ounce" fluid restriction. He denies any caffeine intake or energy drink use. He denies anxiety in the past, but endorses stress over the last month with all his medical issues.     He also reports that on 3/4/17 he started to have bleeding from his mouth, nose, and ears. He was seen again at [facility] and given 10 mL of vitamin K for an INR level of 5, per brother. He has had no further bleeding and " denies tarry stools or blood in stools. As an ESRD patient he was dialyzed per his schedule on Wednesday 3/8/2017. He has a vas-cath in the right upper chest and denies bleeding from this site. He reports urinating 2-3 times per day yellow urine, denies hematuria.     He is being admitted to the Hospital Medicine Service for further evaluation.       Hospital Course:  Mr. White was admitted for Hospital Medicine Service management, he was transfused two units of blood, and was placed on heparin gtt for sub therapeutic INR of 1.7 in setting of recent mechanical AVR on 2/8/2017. He has had improvement in H/H overnight and reports improvement in shortness of breath. His sternal chest pain at incisional site is controlled with current pain med regimen. His blood pressure with improvement overnight on amlodipine 5 mg daily, clonidine 0.1 mg BID, hydralazine 100 mg TID, and labetalol 300 mg BID. His heart rate remains  and he reports occasional feeling of palpitations. He was seen by EP yesterday and is hesitant regarding RFA procedure. He will be dialyzed today and nephrology is following. He also has a 2D echo pending. Will make adjustments to BP meds per EP recommendations.    Interval History: Reports improvement in shortness of breath overnight, denies chest pain or pressure. Incisional pain adequately controlled. Endorses occasional palpitation.     Review of Systems   Constitutional: Negative for chills, diaphoresis, fever and unexpected weight change.   Respiratory: Positive for shortness of breath. Negative for cough, chest tightness and wheezing.    Cardiovascular: Positive for palpitations. Negative for chest pain and leg swelling.   Gastrointestinal: Negative for abdominal pain, diarrhea, nausea and vomiting.   Genitourinary: Negative for difficulty urinating and hematuria.   Neurological: Negative for dizziness, light-headedness and headaches.     Objective:     Vital Signs (Most Recent):  Temp: 98.7  °F (37.1 °C) (03/10/17 0835)  Pulse: 100 (03/10/17 1200)  Resp: 16 (03/10/17 0835)  BP: (!) 151/106 (03/10/17 1200)  SpO2: 99 % (03/10/17 1030) Vital Signs (24h Range):  Temp:  [98 °F (36.7 °C)-99.3 °F (37.4 °C)] 98.7 °F (37.1 °C)  Pulse:  [] 100  Resp:  [16-34] 16  SpO2:  [91 %-100 %] 99 %  BP: (117-164)/() 151/106     Weight: 80.8 kg (178 lb 2.1 oz)  Body mass index is 22.87 kg/(m^2).    Intake/Output Summary (Last 24 hours) at 03/10/17 1217  Last data filed at 03/10/17 0835   Gross per 24 hour   Intake              360 ml   Output              525 ml   Net             -165 ml      Physical Exam   Constitutional: He is oriented to person, place, and time. He appears well-developed. No distress.   Neck: No JVD present.   Cardiovascular: Regular rhythm.  Tachycardia present.    No murmur heard.  PAC's noted on tele   Pulmonary/Chest: Effort normal. He has decreased breath sounds in the right lower field.   Abdominal: Soft. He exhibits no distension. There is no tenderness.   Musculoskeletal: He exhibits no edema.   Neurological: He is alert and oriented to person, place, and time.   Skin: Skin is warm and dry. He is not diaphoretic.   Healing midline sternal incision with steri strips, no s/s of infection    Psychiatric: He has a normal mood and affect.       Significant Labs:   BMP:   Recent Labs  Lab 03/10/17  0451   GLU 80      K 3.5   CL 99   CO2 28   BUN 34*   CREATININE 5.3*   CALCIUM 8.5*   MG 1.6     CBC:   Recent Labs  Lab 03/09/17  0928 03/10/17  0926   WBC 7.73 7.69   HGB 6.6* 8.3*   HCT 20.9* 25.0*    257     CMP:   Recent Labs  Lab 03/09/17  0928 03/10/17  0451    136   K 3.5 3.5   CL 98 99   CO2 29 28   GLU 93 80   BUN 28* 34*   CREATININE 4.5* 5.3*   CALCIUM 8.6* 8.5*   PROT 7.7  --    ALBUMIN 2.0*  --    BILITOT 0.6  --    ALKPHOS 100  --    AST 16  --    ALT 23  --    ANIONGAP 9 9   EGFRNONAA 14.6* 12.0*     Troponin:   Recent Labs  Lab 03/09/17  0928 03/09/17  4809    TROPONINI 0.115* 0.135*     Urine Studies:   Recent Labs  Lab 03/09/17  1658   COLORU Yellow   APPEARANCEUA Clear   PHUR 8.0   SPECGRAV 1.015   PROTEINUA 2+*   GLUCUA Negative   KETONESU Negative   BILIRUBINUA Negative   OCCULTUA Negative   NITRITE Negative   UROBILINOGEN Negative   LEUKOCYTESUR Negative   RBCUA 0   WBCUA 5   BACTERIA Rare   HYALINECASTS 0     All pertinent labs within the past 24 hours have been reviewed.    Significant Imaging: I have reviewed all pertinent imaging results/findings within the past 24 hours.    2d cfd echo:  1 - Upper limit of normal left ventricular enlargement.     2 - Moderately depressed left ventricular systolic function (EF 30-35%).     3 - Concentric hypertrophy.     4 - There is an increased density to the myocardium, suggesting a myocardial infiltrative process (clinical correlation required).     5 - Right atrial enlargement.     6 - Right ventricular hypertrophy.     7 - Moderately to severely depressed right ventricular systolic function .     8 - Aortic valve prosthesis, effective prosthetic valve area corrected for BSA is 1.07 cm2.     9 - Trivial to mild aortic regurgitation.     10 - Mild to moderate tricuspid regurgitation.     11 - Trivial pericardial effusion with thickened pericardium .     12 - Increased central venous pressure.     13 - Atrial septal aneurysm .     14 - Left pleural effusion.     15 - Pulmonary hypertension. The estimated PA systolic pressure is 53 mmHg.     Assessment/Plan:      * SVT (supraventricular tachycardia)  -SVT per EKG from OSH, now in sinus tach with occasional PAC's on tele   -transfused with two units of PRBC's overnight, Iron/TIBC studies indicative of ACD, increased volume has not decreased HR at this point, H/H improved to 8.3/25  -will increase labetalol to 400 mg TiD, as EP recommended to increase BB  -patient more receptive to RFA procedure, will ask EP to revisit this with patient, possibly perform Monday.   - most  likely d/t new onset heart failure EF down to 30-35%, volume overloaded    New onset acute systolic Hear failure: most likely d/t new onset heart failure EF down to 30-35%, volume overloaded, atrial septal bulging, RAP 15, concentric hypertrophy  - cards consult, possibly HTS / pulm htn service consult  - interim add ace and titrate as able    S/P AVR (aortic valve replacement)  -on heparin gtt for sub therapeutic INR of 1.9 today, will restart warfarin this PM, goal 2-3 (AVR 2/8/2017)  -after discussion with clinical pharm will start dose of 2.5 mg warfarin tonight, assess INR tomorrow, may need to increase dose to 5 mg, he had a recent supra therapeutic INR of 5 with oral and nasal bleeding s/p vitamin K     ESRD (end stage renal disease)  - dialysis today, nephrology following and request one time dose of lasix 120 mg IVP to aid in diuresis, has bilateral pleural effusions, pulmonology feels diuresis versus thoracentesis  -K 3.5 and Mag 1.6, will replete with mag 1 g IVP and potassium chloride 20 mEq PO, recheck electrolytes in AM     Anemia of chronic disease  -receiving epoetin 10,000 units today per nephrology   -monitor H/H, consider transfusion over weekend if improvement not noted    Uncontrolled hypertension  -BP improving, on amlodipine 5 mg PO daily, clonidine 0.1 mg BID, hydralazine 100 mg TID, and  labetalol increased to 400 mg TID    Pleural effusion  - breath sounds diminished in right base, reports improvement in shortness of breath and pain overnight  - pulmonology recommendations for diuresis and defer Thoracentesis at this time  - HD today with fluid removal and 120 mg IVP lasix times one today per nephrology  - consider follow up CXR or CT chest if no improvement in symptoms/PE findings     Pulmonary HTN  -continue home dose of sildenafil 20 mg PO TID  -2D echo see results above, ? HTS consult/ PHTN consult, will discuss with cards consult in am.     Serum albumin decreased  -nutritional  referral and dietary consult in  for dietary eduction in AM, needs teaching regarding renal diet and coumadin diet as well as appropriate fluid restriction of 1500 mL/day  -pre albumin 13 and albumin 2  -novasource renal supplements ordered    Adult congenital heart disease  -ASD closure at age 8    VTE Risk Mitigation         Ordered     warfarin (COUMADIN) tablet 2.5 mg  Daily     Route:  Oral        03/10/17 140          Kika Knox NP  Department of Hospital Medicine   [facility]

## 2017-03-10 NOTE — ASSESSMENT & PLAN NOTE
-receiving epoetin 10,000 units today per nephrology   -monitor H/H, consider transfusion over weekend if improvement not noted

## 2017-03-10 NOTE — ASSESSMENT & PLAN NOTE
-post transfusion heart rate remains elevated, skin less ashen today, appears in better spirits, will continue to monitor for s/s of volume depletion  -heart rate elevation likely multifactorial as patient was significantly anemic yesterday and has history of SVT/sinus tach with PAC's in the past

## 2017-03-10 NOTE — PLAN OF CARE
03/10/17 0939   Discharge Assessment   Assessment Type Discharge Planning Assessment   Confirmed/corrected address and phone number on facesheet? No   Assessment information obtained from? Medical Record   Expected Length of Stay (days) 3   Communicated expected length of stay with patient/caregiver no   Prior to hospitilization cognitive status: Alert/Oriented   Prior to hospitalization functional status: Independent   Current cognitive status: Alert/Oriented   Current Functional Status: Independent   Arrived From [facility] hospital  ([facility])   Lives With sibling(s)   Able to Return to Prior Arrangements yes   Is patient able to care for self after discharge? Yes   Does the patient have family/friends to help with healtcare needs after discharge? yes   Who are your caregiver(s) and their phone number(s)? PrincesserMaykel  [phone]   Patient's perception of discharge disposition home or selfcare   Readmission Within The Last 30 Days previous discharge plan unsuccessful   Patient currently being followed by outpatient case management? No   Does the patient currently use HME? No   Patient currently receives private duty nursing? No   Equipment Currently Used at Home none   Do you have any problems affording any of your prescribed medications? No   Is the patient taking medications as prescribed? yes   Do you have any financial concerns preventing you from receiving the healthcare you need? No   Does the patient have transportation to healthcare appointments? Yes   Transportation Available family or friend will provide   On Dialysis? Yes   If yes, what is the name of the dialysis unit? [facility]  [phone]   Does the patient receive outpatient dialysis? Yes   Does the patient receive services at the Coumadin Clinic? Yes   Are there any open cases? No   Discharge Plan A Home with family   Discharge Plan B Home with family;Home Health   Patient/Family In Agreement With Plan unable to  assess   Pt off the floor at 0830 when CM went to the room for DC needs assessment. Information obtained from EMR. Admitted with SVT. Recent valve replacement. On Coumadin with subtherapeutic INR. Lives with brother and is independent in his ADLs. [facility]  [phone]. Plan is to DC home. Will follow for DC needs.

## 2017-03-10 NOTE — ASSESSMENT & PLAN NOTE
-SVT per EKG from OSH, now in sinus tach with occasional PAC's on tele   -transfused with two units of PRBC's overnight, Iron/TIBC studies indicative of ACD, increased volume has not decreased HR at this point, H/H improved to 8.3/25  -will increase labetalol to 400 mg BID per EP recommendations  -patient more receptive to RFA procedure, will ask EP to revisit this with patient

## 2017-03-10 NOTE — ASSESSMENT & PLAN NOTE
-BP improving, on amlodipine 5 mg PO daily, clonidine 0.1 mg BID, hydralazine 100 mg TID, and  labetalol increased to 400 mg BID

## 2017-03-10 NOTE — SUBJECTIVE & OBJECTIVE
Interval History: Reports improvement in shortness of breath overnight, denies chest pain or pressure. Incisional pain adequately controlled. Endorses occasional palpitation.     Review of Systems   Constitutional: Negative for chills, diaphoresis, fever and unexpected weight change.   Respiratory: Positive for shortness of breath. Negative for cough, chest tightness and wheezing.    Cardiovascular: Positive for palpitations. Negative for chest pain and leg swelling.   Gastrointestinal: Negative for abdominal pain, diarrhea, nausea and vomiting.   Genitourinary: Negative for difficulty urinating and hematuria.   Neurological: Negative for dizziness, light-headedness and headaches.     Objective:     Vital Signs (Most Recent):  Temp: 98.7 °F (37.1 °C) (03/10/17 0835)  Pulse: 100 (03/10/17 1200)  Resp: 16 (03/10/17 0835)  BP: (!) 151/106 (03/10/17 1200)  SpO2: 99 % (03/10/17 1030) Vital Signs (24h Range):  Temp:  [98 °F (36.7 °C)-99.3 °F (37.4 °C)] 98.7 °F (37.1 °C)  Pulse:  [] 100  Resp:  [16-34] 16  SpO2:  [91 %-100 %] 99 %  BP: (117-164)/() 151/106     Weight: 80.8 kg (178 lb 2.1 oz)  Body mass index is 22.87 kg/(m^2).    Intake/Output Summary (Last 24 hours) at 03/10/17 1217  Last data filed at 03/10/17 0835   Gross per 24 hour   Intake              360 ml   Output              525 ml   Net             -165 ml      Physical Exam   Constitutional: He is oriented to person, place, and time. He appears well-developed. No distress.   Neck: No JVD present.   Cardiovascular: Regular rhythm.  Tachycardia present.    No murmur heard.  PAC's noted on tele   Pulmonary/Chest: Effort normal. He has decreased breath sounds in the right lower field.   Abdominal: Soft. He exhibits no distension. There is no tenderness.   Musculoskeletal: He exhibits no edema.   Neurological: He is alert and oriented to person, place, and time.   Skin: Skin is warm and dry. He is not diaphoretic.   Healing midline sternal incision  with steri strips, no s/s of infection    Psychiatric: He has a normal mood and affect.       Significant Labs:   BMP:   Recent Labs  Lab 03/10/17  0451   GLU 80      K 3.5   CL 99   CO2 28   BUN 34*   CREATININE 5.3*   CALCIUM 8.5*   MG 1.6     CBC:   Recent Labs  Lab 03/09/17 0928 03/10/17  0926   WBC 7.73 7.69   HGB 6.6* 8.3*   HCT 20.9* 25.0*    257     CMP:   Recent Labs  Lab 03/09/17 0928 03/10/17  0451    136   K 3.5 3.5   CL 98 99   CO2 29 28   GLU 93 80   BUN 28* 34*   CREATININE 4.5* 5.3*   CALCIUM 8.6* 8.5*   PROT 7.7  --    ALBUMIN 2.0*  --    BILITOT 0.6  --    ALKPHOS 100  --    AST 16  --    ALT 23  --    ANIONGAP 9 9   EGFRNONAA 14.6* 12.0*     Troponin:   Recent Labs  Lab 03/09/17 0928 03/09/17  1215   TROPONINI 0.115* 0.135*     Urine Studies:   Recent Labs  Lab 03/09/17  1658   COLORU Yellow   APPEARANCEUA Clear   PHUR 8.0   SPECGRAV 1.015   PROTEINUA 2+*   GLUCUA Negative   KETONESU Negative   BILIRUBINUA Negative   OCCULTUA Negative   NITRITE Negative   UROBILINOGEN Negative   LEUKOCYTESUR Negative   RBCUA 0   WBCUA 5   BACTERIA Rare   HYALINECASTS 0     All pertinent labs within the past 24 hours have been reviewed.    Significant Imaging: I have reviewed all pertinent imaging results/findings within the past 24 hours.

## 2017-03-10 NOTE — PLAN OF CARE
Problem: Patient Care Overview  Goal: Plan of Care Review  Outcome: Ongoing (interventions implemented as appropriate)  Pt. Remains free from falls/ injury/trauma. No complaints of CP, or SOB. Heparin gtt infusing.  Pain controlled with PRN pain medication. Plan of Care reviewed with patient. VSS and NADN. Will continue to monitor.

## 2017-03-10 NOTE — CONSULTS
Electrophysiology Progress Note  Attending Physician: Jose Bond MD  Hospital Day: 2   HPI  47 year-old complicated patient with severe PH, ESRD on HD, and severe AS s/p recent mechanical AVR. He has been having intermittent palpitations for 2-3 months with admissions to [facility] for SVT (strip in media folder shows narrow complex regular tachycardia without any discernible P-waves). These terminate with IV diltiazem. He was discharged in January on carvedilol and verapamil and returned in complete heart block requiring IV dopamine. This resolved with stopping the carvedilol and verapamil. He has had severe epistaxis since his AVR. He went to OSH today with severe anemia and wide complex regular tachycardia (right bundle, left axis) with ? Short RP relationship. This resolved with verapamil dose. The wide complex tach could be VT or SVT with aberrancy. We discussed EP study and potential RFA once his acute issues resolve (currently short of breath, very tired and on oxygen for sO2 of 89-90% on room air).   Subjective:   Interval History: Patient seen and examined, no events overnight, denied complaints. Denied CP, SOB, Cough, Fever, Chills.      Medications:   Continuous Infusions:   heparin (porcine) in D5W 23 Units/kg/hr (03/10/17 1106)       Scheduled Meds:   sodium chloride 0.9%   Intravenous Once    amlodipine  5 mg Oral Daily    aspirin  81 mg Oral Daily    cloNIDine  0.1 mg Oral BID    epoetin vikki (PROCRIT) injection  10,000 Units Intravenous Once    furosemide  120 mg Intravenous Once    hydrALAZINE  100 mg Oral Q8H    labetalol  400 mg Oral Q12H    pantoprazole  40 mg Oral Daily    sevelamer carbonate  800 mg Oral TID WM    sildenafil  20 mg Oral TID    sodium chloride 0.9%  3 mL Intravenous Q8H     PRN Meds:sodium chloride, sodium chloride 0.9%, acetaminophen, heparin (porcine), heparin (PORCINE), heparin (PORCINE), ondansetron, ondansetron, oxycodone, polyethylene  glycol, tramadol  Objective:     Vitals:  Temp:  [98 °F (36.7 °C)-99.3 °F (37.4 °C)]   Pulse:  []   Resp:  [16-34]   BP: (117-174)/()   SpO2:  [91 %-100 %]  on  I/O's:    Intake/Output Summary (Last 24 hours) at 03/10/17 1126  Last data filed at 03/10/17 0835   Gross per 24 hour   Intake              360 ml   Output              525 ml   Net             -165 ml        Constitutional: Appearing dyspneic  HEENT: Sclera anicteric, PERRLA, EOMI  Neck: 10-12 cm JVD, no carotid bruits  CV: RRR, no murmur, normal S1/S2  Pulm: Bibasilar crackles  GI: Abdomen soft, NTND, +BS  Extremities: Trace LE edema, warm and well perfused  Skin: No ecchymosis, erythema, or ulcers  Psych: AOx3, appropriate affect  Neuro: CNII-XII intact, no focal deficits    Labs:       Recent Labs  Lab 03/09/17  0928 03/10/17  0451    136   K 3.5 3.5   CL 98 99   CO2 29 28   BUN 28* 34*   CREATININE 4.5* 5.3*   GLU 93 80   ANIONGAP 9 9       Recent Labs  Lab 03/09/17 0928   AST 16   ALT 23   ALKPHOS 100   BILITOT 0.6   ALBUMIN 2.0*        Recent Labs  Lab 03/09/17 0928 03/10/17  0926   WBC 7.73 7.69   HGB 6.6* 8.3*   HCT 20.9* 25.0*    257   GRAN 65.0  5.0 70.0  5.4       Recent Labs  Lab 03/09/17 0928   INR 1.7*       Recent Labs  Lab 03/09/17 0928 03/09/17  1215   TROPONINI 0.115* 0.135*   BNP 1292*  --       Micro:   Blood Cultures  Lab Results   Component Value Date    LABBLOO No Growth to date 03/09/2017     Urine Cultures  No results found for: LABURIN    Imaging:     EF   Date Value Ref Range Status   02/06/2017 60 55 - 65          Assessment&Plan:   47 y.o. male with multiple co morbidities transferred for further evaluation of wide complex tachycardia. Currently in sinus tachycardia. One of the diffrentials include VT- fascicular VT- verapamil sensitive although there was no AV disassociation, nor capture beats/fusion beats noticed. Hence recommend EP study when stable from Anemia standpoint.       Recomendations:  -EP study for SVT/VT evaluation and ablation procedure Monday 3/13/2017  -Rate control with labetaol or now.        Tonia Lazaro MD  Cardiology Fellow  Pager: [phone]

## 2017-03-10 NOTE — CONSULTS
Consult Note  Nephrology    Consult Requested By: Jose Bond MD  Reason for Consult: ESRD     SUBJECTIVE:     History of Present Illness:  Patient is a 47 y.o. male presents with history of recent aortic valve replacement (2/8/17, 22 mm mechanical, indication: mod/severe aortic stenosis and regurgitation), tricuspid valve repair (28 mm annuloplasty), redo sternotomy, possible ASD closure as a child, ESRD on HD, pulmonary HTN, HTN who comes as a transfer from [place] for SVT-. He was administered verapamil which cardioverted him. We are consulted for backup HD. Patient has been on HD for the last 6 months. He goes to HD to [facility] in [place]. His dry weight 78 kg.     Past Medical History:   Diagnosis Date    CKD (chronic kidney disease) stage 3, GFR 30-59 ml/min     Hypertension     Tachycardia      Past Surgical History:   Procedure Laterality Date    CARDIAC SURGERY      aortic valve replacement     History reviewed. No pertinent family history.  Social History   Substance Use Topics    Smoking status: Never Smoker    Smokeless tobacco: None    Alcohol use No      Comment: no longer drinks since surgery       Review of patient's allergies indicates:  No Known Allergies     Review of Systems:  Constitution: Positive for weakness. Negative for chills and fever.   HENT: Positive for nosebleeds.   Cardiovascular: Positive for chest pain, dyspnea on exertion, near-syncope, orthopnea and palpitations. Negative for irregular heartbeat, leg swelling, paroxysmal nocturnal dyspnea and syncope.   Respiratory: Positive for shortness of breath. Negative for sleep disturbances due to breathing.   Hematologic/Lymphatic: Positive for bleeding problem.   Gastrointestinal: Negative for hematemesis and melena.   Neurological: Negative for dizziness and focal weakness.   Psychiatric/Behavioral: Negative for altered mental status.    OBJECTIVE:     Vital Signs (Most Recent)  Temp: 98.7 °F (37.1 °C) (03/10/17  0800)  Pulse: (!) 113 (03/10/17 0825)  Resp: 19 (03/10/17 0800)  BP: 134/84 (03/10/17 0800)  SpO2: (!) 94 % (03/10/17 0800)    Vital Signs Range (Last 24H):  Temp:  [98 °F (36.7 °C)-99.3 °F (37.4 °C)]   Pulse:  []   Resp:  [10-34]   BP: (117-174)/()   SpO2:  [91 %-100 %]     Physical Exam:  General: No acute distress, well groomed, alert and oriented x 3  HEENT: Normocephalic, atraumatic, EOM's intact bilaterally, external inspection of ears and nose normal, moist mucous membranes, no oral ulcerations/lesions  Neck: Supple, symmetrical, trachea midline, no thyromegaly, no JVD  Respiratory: Decreased breath sounds at bases. Mild crackles bilaterally.   Cardiovacular: Tachycardic, mechanical heart sound.Midline chest scar.   Gastrointestinal: Soft, non-tender, bowel sounds normal, no hepatosplenomegaly  Musculoskeletal: No knee or ankle joint tenderness or swelling.   Extremities: No clubbing or cyanosis of bilateral upper extremities; trace lower extremity edema bilaterally, radial pulses 2+ bilaterally, symmetric  Skin: warm and dry; no rash on exposed skin    Laboratory:  CBC:   Recent Labs  Lab 03/09/17  0928   WBC 7.73   RBC 2.21*   HGB 6.6*   HCT 20.9*      MCV 95   MCH 29.9   MCHC 31.6*     CMP:   Recent Labs  Lab 03/09/17  0928 03/10/17  0451   GLU 93 80   CALCIUM 8.6* 8.5*   ALBUMIN 2.0*  --    PROT 7.7  --     136   K 3.5 3.5   CO2 29 28   CL 98 99   BUN 28* 34*   CREATININE 4.5* 5.3*   ALKPHOS 100  --    ALT 23  --    AST 16  --    BILITOT 0.6  --        Diagnostic Results:  Labs: Reviewed  X-Ray: Reviewed    ASSESSMENT/PLAN:     A 47 y.o. male presents with history of recent aortic valve replacement (2/8/17, 22 mm mechanical, indication: mod/severe aortic stenosis and regurgitation), tricuspid valve repair (28 mm annuloplasty), redo sternotomy, possible ASD closure as a child, ESRD on HD, pulmonary HTN, HTN who comes as a transfer from [place] for SVT-.    ESRD on IHD  MWF  [facility]  Duration of outpatient dialysis session - 4 hours  Residual Renal Function - Yes  - Will provide dialysis for metabolic clearance and volume management x 4 hours  - Seen and examined today during hemodialysis; tolerating treatment well without issues. Denied headaches, chest pain, abdominal pain, or muscle cramps   -   - Target ultrafiltration 2-3L as tolerated by patient    - Dialysate adjusted to current labs  Aceess: RIALMA perm- a- cath       Anemia of Chronic Kidney Disease   - Target Hgb: 10- 11.5  - Currently not at goal (6.6)   - Ferritin 1,201  - Blood transfusions as per primary team   - Will give epoetin 10,000 units today     MBD  - Continue binders as taken outpatient  - Will order PTH and vitamin D levels       HTN   - Well control BP, will continue to monitor. Goal for BP is <130 mmHg SBP and BDP <80 mmHg      Patrick Ferrera   Nephrology fellow PGY- 5  [phone]     I have reviewed and concur with the fellow's history, physical, assessment, and plan. I have personally interviewed and examined the patient at bedside.

## 2017-03-10 NOTE — ASSESSMENT & PLAN NOTE
-breath sounds diminished in right base, reports improvement in shortness of breath and pain overnight  -pulmonology recommendations for diuresis vs. Thoracentesis  -HD today with fluid removal and 120 mg IVP lasix times one today per nephrology  -consider follow up CXR or CT chest if no improvement in symptoms/PE findings

## 2017-03-10 NOTE — NURSING
From dialysis, aaox4, tele st hr111. ra pulse ox 99%. Heparin at 23 units/kg per pump. No acute distress.

## 2017-03-10 NOTE — PLAN OF CARE
03/10/17 0937   Readmission Questionnaire   At the time of your discharge, did someone talk to you about what your health problems were? Yes   At the time of discharge, did someone talk to you about what to watch out for regarding worsening of your health problem? Yes   At the time of discharge, did someone talk to you about what to do if you experienced worsening of your health problem? Yes   At the time of discharge, did someone talk to you about which medication to take when you left the hospital and which ones to stop taking? Yes   At the time of discharge, did someone talk to you about when and where to follow up with a doctor after you left the hospital? Yes   Do you have problems taking your medications as prescribed? Yes   Does the patient have transportation to healthcare appointments? Yes   Lives With sibling(s)   Living Arrangements house   Does the patient have family/friends to help with healtcare needs after discharge? yes   Are you currently feeling confused? No   Are you currently having problems thinking? No   Are you currently having memory problems? No

## 2017-03-10 NOTE — PROGRESS NOTES
Patient arrived via stretcher and stood to weigh. Pt. Placed on unit monitors. 0855 Maintenance Isolation Hd initated to Right IJ permcath with no difficulty. Lines secured. Dr. Ferrera at bedside. HD orders verified. Patient without complaint at this time. Monitoring

## 2017-03-10 NOTE — CONSULTS
Pulmonary Consult Note    Reason for consult: pleural effusions    HPI:  Isidro White is a 47 y.o. male [facility] congenital heart dz, AVR (2/2017), TVR s/p annuloplasty, ASD repair as child, CKD currently requiring dialysis (MWF), PHTN admitted with palpitations and orthopnea for a few days.  Palpitations started today. Some incisional pain, but no cardiac chest pain.  No cough/fevers/chills/dyspnea/pleurisy.  In the ED, he was noted to be in SVT    Past Medical History:   Diagnosis Date    CKD (chronic kidney disease) stage 3, GFR 30-59 ml/min     Hypertension     Tachycardia      Past Surgical History:   Procedure Laterality Date    CARDIAC SURGERY      aortic valve replacement     History reviewed. No pertinent family history.    Social History     Social History    Marital status: Single     Spouse name: N/A    Number of children: N/A    Years of education: N/A     Occupational History    Not on file.     Social History Main Topics    Smoking status: Never Smoker    Smokeless tobacco: Not on file    Alcohol use No      Comment: no longer drinks since surgery    Drug use: No    Sexual activity: Not on file     Other Topics Concern    Not on file     Social History Narrative       Current Facility-Administered Medications:     0.9%  NaCl infusion (for blood administration), , Intravenous, Q24H PRN, Gavin Carroll MD    acetaminophen tablet 650 mg, 650 mg, Oral, Q8H PRN, Gavin Carroll MD    amlodipine tablet 5 mg, 5 mg, Oral, Daily, Stacy Flynn NP, 5 mg at 03/09/17 1805    aspirin EC tablet 81 mg, 81 mg, Oral, Daily, Gavin Carroll MD, 81 mg at 03/09/17 1458    cloNIDine tablet 0.1 mg, 0.1 mg, Oral, BID, Gavin Carroll MD    heparin 25,000 units in dextrose 5% 250 mL (100 units/mL) infusion, 17 Units/kg/hr, Intravenous, Continuous, Kika Knox NP, Last Rate: 12.7 mL/hr at 03/09/17 1225, 17 Units/kg/hr at 03/09/17 1225    hydrALAZINE tablet 100 mg, 100 mg, Oral,  Q8H, Gavin Carroll MD, 100 mg at 03/09/17 1457    labetalol tablet 300 mg, 300 mg, Oral, Q12H, Stacy Flynn NP, 300 mg at 03/09/17 1457    ondansetron disintegrating tablet 8 mg, 8 mg, Oral, Q8H PRN, Stacy Flynn NP    ondansetron injection 4 mg, 4 mg, Intravenous, Q6H PRN, Gavin Carroll MD    oxycodone immediate release tablet 10 mg, 10 mg, Oral, Q4H PRN, Kika Knox NP, 10 mg at 03/09/17 1804    pantoprazole EC tablet 40 mg, 40 mg, Oral, Daily, Gavin Carroll MD, 40 mg at 03/09/17 1458    polyethylene glycol packet 17 g, 17 g, Oral, Daily PRN, Gavin Carroll MD    sevelamer carbonate tablet 800 mg, 800 mg, Oral, TID WM, Gavin Carroll MD, 800 mg at 03/09/17 1804    sildenafil tablet 20 mg, 20 mg, Oral, TID, Stacy Flynn NP, 20 mg at 03/09/17 1458    sodium chloride 0.9% flush 3 mL, 3 mL, Intravenous, Q8H, Gavin Carroll MD    tramadol tablet 50 mg, 50 mg, Oral, Q6H PRN, Kika Knox NP    Review of patient's allergies indicates:  No Known Allergies    ROS negative aside from that noted in HPI    Vitals:    03/09/17 1646 03/09/17 1720 03/09/17 1800 03/09/17 1900   BP: (!) 154/98 (!) 148/94     BP Location:       Patient Position:       BP Method:       Pulse: 108 104 108 107   Resp:  (!) 24     Temp: 99.3 °F (37.4 °C) 99 °F (37.2 °C)     TempSrc: Oral Oral     SpO2: (!) 91% 95%  (!) 92%   Weight:       Height:         NAD, A&O  Supple, elevated JVP  Tachy, regular   Decreased in the bases  S/nt/somewhat distended  No edema/cyanosis    Labs:  Reviewed    Imaging   Reviewed    Assessment:  Small BL pleural effusion-in setting of HTN, dialysis dependence, recent SVT, elevated JVP, no fever/chills/cough/SOB.  These are mostly likely transudative effusions.     Plan:   No indication for thoracentesis.  Just needs more volume removed with dialysis and management of his SVT    Corona Paige MD  Pulmonary Fellow  [phone]

## 2017-03-11 PROBLEM — I42.8 NONISCHEMIC CARDIOMYOPATHY: Status: ACTIVE | Noted: 2017-03-11

## 2017-03-11 PROBLEM — E86.9 VOLUME DEPLETION: Status: RESOLVED | Noted: 2017-03-09 | Resolved: 2017-03-11

## 2017-03-11 PROBLEM — E43 SEVERE PROTEIN-CALORIE MALNUTRITION: Status: ACTIVE | Noted: 2017-03-09

## 2017-03-11 PROBLEM — I50.41 ACUTE COMBINED SYSTOLIC AND DIASTOLIC HEART FAILURE: Status: ACTIVE | Noted: 2017-03-11

## 2017-03-11 LAB
ALBUMIN SERPL BCP-MCNC: 1.9 G/DL
ANION GAP SERPL CALC-SCNC: 6 MMOL/L
BASOPHILS # BLD AUTO: 0.02 K/UL
BASOPHILS NFR BLD: 0.2 %
BUN SERPL-MCNC: 25 MG/DL
CALCIUM SERPL-MCNC: 8.5 MG/DL
CHLORIDE SERPL-SCNC: 106 MMOL/L
CO2 SERPL-SCNC: 25 MMOL/L
CREAT SERPL-MCNC: 4.4 MG/DL
DIFFERENTIAL METHOD: ABNORMAL
EOSINOPHIL # BLD AUTO: 0.4 K/UL
EOSINOPHIL NFR BLD: 4.5 %
ERYTHROCYTE [DISTWIDTH] IN BLOOD BY AUTOMATED COUNT: 18 %
EST. GFR  (AFRICAN AMERICAN): 17.3 ML/MIN/1.73 M^2
EST. GFR  (NON AFRICAN AMERICAN): 15 ML/MIN/1.73 M^2
FACT X PPP CHRO-ACNC: 0.33 IU/ML
GLUCOSE SERPL-MCNC: 81 MG/DL
HCT VFR BLD AUTO: 24.6 %
HGB BLD-MCNC: 7.8 G/DL
INR PPP: 1.8
LYMPHOCYTES # BLD AUTO: 1.3 K/UL
LYMPHOCYTES NFR BLD: 14.9 %
MCH RBC QN AUTO: 30.1 PG
MCHC RBC AUTO-ENTMCNC: 31.7 %
MCV RBC AUTO: 95 FL
MONOCYTES # BLD AUTO: 0.7 K/UL
MONOCYTES NFR BLD: 8.8 %
NEUTROPHILS # BLD AUTO: 6 K/UL
NEUTROPHILS NFR BLD: 71.5 %
PHOSPHATE SERPL-MCNC: 2.9 MG/DL
PLATELET # BLD AUTO: 255 K/UL
PMV BLD AUTO: 9.8 FL
POTASSIUM SERPL-SCNC: 4.2 MMOL/L
PROTHROMBIN TIME: 18.6 SEC
RBC # BLD AUTO: 2.59 M/UL
SODIUM SERPL-SCNC: 137 MMOL/L
WBC # BLD AUTO: 8.37 K/UL

## 2017-03-11 PROCEDURE — 85025 COMPLETE CBC W/AUTO DIFF WBC: CPT

## 2017-03-11 PROCEDURE — 25000003 PHARM REV CODE 250: Performed by: NURSE PRACTITIONER

## 2017-03-11 PROCEDURE — 27201040 HC RC 50 FILTER

## 2017-03-11 PROCEDURE — 80069 RENAL FUNCTION PANEL: CPT

## 2017-03-11 PROCEDURE — 25000003 PHARM REV CODE 250: Performed by: INTERNAL MEDICINE

## 2017-03-11 PROCEDURE — P9047 ALBUMIN (HUMAN), 25%, 50ML: HCPCS | Performed by: INTERNAL MEDICINE

## 2017-03-11 PROCEDURE — 85520 HEPARIN ASSAY: CPT

## 2017-03-11 PROCEDURE — 36415 COLL VENOUS BLD VENIPUNCTURE: CPT

## 2017-03-11 PROCEDURE — 85610 PROTHROMBIN TIME: CPT

## 2017-03-11 PROCEDURE — 27000207 HC ISOLATION

## 2017-03-11 PROCEDURE — 20600001 HC STEP DOWN PRIVATE ROOM

## 2017-03-11 PROCEDURE — 99231 SBSQ HOSP IP/OBS SF/LOW 25: CPT | Mod: ,,, | Performed by: INTERNAL MEDICINE

## 2017-03-11 PROCEDURE — 80100016 HC MAINTENANCE HEMODIALYSIS

## 2017-03-11 PROCEDURE — 90935 HEMODIALYSIS ONE EVALUATION: CPT | Mod: ,,, | Performed by: INTERNAL MEDICINE

## 2017-03-11 PROCEDURE — P9021 RED BLOOD CELLS UNIT: HCPCS

## 2017-03-11 PROCEDURE — 25000003 PHARM REV CODE 250: Performed by: HOSPITALIST

## 2017-03-11 PROCEDURE — 63600175 PHARM REV CODE 636 W HCPCS: Performed by: INTERNAL MEDICINE

## 2017-03-11 PROCEDURE — 25000003 PHARM REV CODE 250: Performed by: EMERGENCY MEDICINE

## 2017-03-11 PROCEDURE — 99233 SBSQ HOSP IP/OBS HIGH 50: CPT | Mod: ,,, | Performed by: NURSE PRACTITIONER

## 2017-03-11 RX ORDER — METOPROLOL TARTRATE 50 MG/1
100 TABLET ORAL 2 TIMES DAILY
Status: DISCONTINUED | OUTPATIENT
Start: 2017-03-11 | End: 2017-03-12

## 2017-03-11 RX ORDER — LISINOPRIL 10 MG/1
10 TABLET ORAL 2 TIMES DAILY
Status: DISCONTINUED | OUTPATIENT
Start: 2017-03-11 | End: 2017-03-12

## 2017-03-11 RX ORDER — ALBUMIN HUMAN 250 G/1000ML
25 SOLUTION INTRAVENOUS ONCE
Status: COMPLETED | OUTPATIENT
Start: 2017-03-11 | End: 2017-03-11

## 2017-03-11 RX ORDER — AMLODIPINE BESYLATE 5 MG/1
5 TABLET ORAL DAILY
Status: DISCONTINUED | OUTPATIENT
Start: 2017-03-12 | End: 2017-03-14

## 2017-03-11 RX ORDER — HYDROCODONE BITARTRATE AND ACETAMINOPHEN 500; 5 MG/1; MG/1
TABLET ORAL
Status: DISCONTINUED | OUTPATIENT
Start: 2017-03-11 | End: 2017-03-12

## 2017-03-11 RX ORDER — LISINOPRIL 10 MG/1
10 TABLET ORAL DAILY
Status: DISCONTINUED | OUTPATIENT
Start: 2017-03-11 | End: 2017-03-11

## 2017-03-11 RX ADMIN — Medication 3 ML: at 03:03

## 2017-03-11 RX ADMIN — ALBUMIN (HUMAN) 25 G: 12.5 SOLUTION INTRAVENOUS at 08:03

## 2017-03-11 RX ADMIN — SEVELAMER CARBONATE 800 MG: 800 TABLET, FILM COATED ORAL at 07:03

## 2017-03-11 RX ADMIN — HEPARIN SODIUM AND DEXTROSE 25 UNITS/KG/HR: 10000; 5 INJECTION INTRAVENOUS at 06:03

## 2017-03-11 RX ADMIN — OXYCODONE HYDROCHLORIDE 10 MG: 5 TABLET ORAL at 06:03

## 2017-03-11 RX ADMIN — WARFARIN SODIUM 2.5 MG: 2.5 TABLET ORAL at 05:03

## 2017-03-11 RX ADMIN — LISINOPRIL 10 MG: 10 TABLET ORAL at 11:03

## 2017-03-11 RX ADMIN — SEVELAMER CARBONATE 800 MG: 800 TABLET, FILM COATED ORAL at 11:03

## 2017-03-11 RX ADMIN — SILDENAFIL 20 MG: 20 TABLET ORAL at 09:03

## 2017-03-11 RX ADMIN — HEPARIN SODIUM 1000 UNITS: 1000 INJECTION, SOLUTION INTRAVENOUS; SUBCUTANEOUS at 10:03

## 2017-03-11 RX ADMIN — HYDRALAZINE HYDROCHLORIDE 100 MG: 50 TABLET ORAL at 03:03

## 2017-03-11 RX ADMIN — LISINOPRIL 10 MG: 10 TABLET ORAL at 09:03

## 2017-03-11 RX ADMIN — CLONIDINE HYDROCHLORIDE 0.1 MG: 0.1 TABLET ORAL at 11:03

## 2017-03-11 RX ADMIN — HEPARIN SODIUM AND DEXTROSE 25 UNITS/KG/HR: 10000; 5 INJECTION INTRAVENOUS at 09:03

## 2017-03-11 RX ADMIN — LABETALOL HCL 400 MG: 100 TABLET, FILM COATED ORAL at 05:03

## 2017-03-11 RX ADMIN — ASPIRIN 81 MG: 81 TABLET, COATED ORAL at 11:03

## 2017-03-11 RX ADMIN — AMLODIPINE BESYLATE 10 MG: 10 TABLET ORAL at 11:03

## 2017-03-11 RX ADMIN — METOPROLOL TARTRATE 100 MG: 50 TABLET, FILM COATED ORAL at 09:03

## 2017-03-11 RX ADMIN — PANTOPRAZOLE SODIUM 40 MG: 40 TABLET, DELAYED RELEASE ORAL at 11:03

## 2017-03-11 RX ADMIN — Medication 3 ML: at 06:03

## 2017-03-11 RX ADMIN — HYDRALAZINE HYDROCHLORIDE 100 MG: 50 TABLET ORAL at 05:03

## 2017-03-11 RX ADMIN — SILDENAFIL 20 MG: 20 TABLET ORAL at 03:03

## 2017-03-11 RX ADMIN — OXYCODONE HYDROCHLORIDE 10 MG: 5 TABLET ORAL at 11:03

## 2017-03-11 RX ADMIN — SILDENAFIL 20 MG: 20 TABLET ORAL at 05:03

## 2017-03-11 RX ADMIN — OXYCODONE HYDROCHLORIDE 10 MG: 5 TABLET ORAL at 09:03

## 2017-03-11 RX ADMIN — Medication 3 ML: at 09:03

## 2017-03-11 RX ADMIN — HYDRALAZINE HYDROCHLORIDE 100 MG: 50 TABLET ORAL at 09:03

## 2017-03-11 NOTE — ASSESSMENT & PLAN NOTE
New onset systolic heart failure along with PulmHTN, now with what appears to be volume overload as RAP are 15, new onset BIV failure, will start ACEI, titrate to b/p and may need to cut back on labetolol however that's for rate control and afterload reduction.   2d cfd echo:  1 - Upper limit of normal left ventricular enlargement.     2 - Moderately depressed left ventricular systolic function (EF 30-35%).     3 - Concentric hypertrophy.     4 - There is an increased density to the myocardium, suggesting a myocardial infiltrative process (clinical correlation required).     5 - Right atrial enlargement.     6 - Right ventricular hypertrophy.     7 - Moderately to severely depressed right ventricular systolic function .     8 - Aortic valve prosthesis, effective prosthetic valve area corrected for BSA is 1.07 cm2.     9 - Trivial to mild aortic regurgitation.     10 - Mild to moderate tricuspid regurgitation.     11 - Trivial pericardial effusion with thickened pericardium .     12 - Increased central venous pressure.     13 - Atrial septal aneurysm .     14 - Left pleural effusion.     15 - Pulmonary hypertension. The estimated PA systolic pressure is 53 mmHg.

## 2017-03-11 NOTE — ASSESSMENT & PLAN NOTE
-continue home dose of sildenafil 20 mg PO TID  -2D echo PaP 53, consulted cardiology, asked if we need HTS on board. They will look at patient and get back to us.

## 2017-03-11 NOTE — ANESTHESIA PREPROCEDURE EVALUATION
03/10/2017  Isidro White is a 47 y.o., male c PmHx of AV replacement and TV repair on February 8, 2017, ASD closure at age 8, ESRD on HD M/W/F, pulmonary hypertension, and HTN.  He was initially seen at [facility] for SVT and given verapamil.  He then had recurrence and additionally bleeding from nose mouth and ears; INR was found to be 5.  Upon admission he was anemic to 20.9    Pre-operative evaluation for Procedure(s) (LRB):  ABLATION (N/A)    IV Access:  PIV RF 20g  PIV RW 18g  HD catheter R SC    Oxygen/Ventilatory Requirements:  RA    Infusions:   heparin (porcine) in D5W 23 Units/kg/hr (03/10/17 1106)         Last Airway:    Placement Date: 02/08/17; Method of Intubation: Direct laryngoscopy; Airway Device: Endotracheal Tube; Airway Device Size: 9.0; Style: Cuffed; Cuff Inflation: Minimal occlusive pressure;  Depth of Insertion: 24; Securment: Lips; Breath Sounds: Equal Bilateral; Removal Date: 02/10/17;  Removal Time: 1330; Removal Indication & Assessment: removed per order; Name of Person who Removed: kaz be    Patient Active Problem List   Diagnosis    Dehydration    Uncontrolled hypertension    Hypertensive cardiomegaly with heart failure    CKD stage 5 secondary to hypertension    Pulmonary HTN    Nonrheumatic aortic valve stenosis    Adult congenital heart disease    Hyperglycemia    S/P AVR (aortic valve replacement)    Postprocedural hypotension    ESRD (end stage renal disease)    SVT (supraventricular tachycardia)    Pleural effusion    Volume depletion    Serum albumin decreased    Anemia of chronic disease    Acute combined systolic and diastolic heart failure    Nonischemic cardiomyopathy       Review of patient's allergies indicates:  No Known Allergies    No current facility-administered medications on file prior to encounter.      Current Outpatient  Prescriptions on File Prior to Encounter   Medication Sig Dispense Refill    ascorbic acid, vitamin C, (VITAMIN C) 500 MG tablet Take 1 tablet (500 mg total) by mouth 2 (two) times daily.      aspirin (ECOTRIN) 81 MG EC tablet Take 1 tablet (81 mg total) by mouth once daily.  0    cloNIDine (CATAPRES) 0.1 MG tablet Take 1 tablet (0.1 mg total) by mouth 2 (two) times daily. 60 tablet 11    docusate sodium (COLACE) 100 MG capsule Take 1 capsule (100 mg total) by mouth 2 (two) times daily.  0    hydrALAZINE (APRESOLINE) 100 MG tablet TAKE ONE TABLET BY MOUTH THREE TIMES DAILY FOR  HYPERTENSION 90 tablet 1    labetalol (NORMODYNE) 300 MG tablet Take 1 tablet (300 mg total) by mouth every 12 (twelve) hours. 60 tablet 1    omega-3 fatty acids-vitamin E (FISH OIL) 1,000 mg Cap Take 1 capsule by mouth once daily.  0    oxycodone (ROXICODONE) 5 MG immediate release tablet Take 1 tablet (5 mg total) by mouth every 4 (four) hours as needed. 60 tablet 0    sevelamer carbonate (RENVELA) 800 mg Tab Take 1 tablet (800 mg total) by mouth 3 (three) times daily with meals. 90 tablet 11    sildenafil (REVATIO) 20 mg Tab Take 1 tablet (20 mg total) by mouth 3 (three) times daily. 90 tablet 11    warfarin (COUMADIN) 7.5 MG tablet Take 1 tablet (7.5 mg total) by mouth Daily. 30 tablet 11       Past Surgical History:   Procedure Laterality Date    CARDIAC SURGERY      aortic valve replacement       Social History     Social History    Marital status: Single     Spouse name: N/A    Number of children: N/A    Years of education: N/A     Occupational History    Not on file.     Social History Main Topics    Smoking status: Never Smoker    Smokeless tobacco: Not on file    Alcohol use No      Comment: no longer drinks since surgery    Drug use: No    Sexual activity: Not on file     Other Topics Concern    Not on file     Social History Narrative         Vital Signs Range (Last 24H):  Temp:  [36.7 °C (98 °F)-37.1 °C  (98.7 °F)]   Pulse:  []   Resp:  [16-20]   BP: (117-162)/()   SpO2:  [91 %-100 %]       CBC:   Recent Labs      03/09/17   0928  03/10/17   0926   WBC  7.73  7.69   RBC  2.21*  2.77*   HGB  6.6*  8.3*   HCT  20.9*  25.0*   PLT  237  257   MCV  95  90   MCH  29.9  30.0   MCHC  31.6*  33.2       CMP:   Recent Labs      03/09/17   0928  03/10/17   0451   NA  136  136   K  3.5  3.5   CL  98  99   CO2  29  28   BUN  28*  34*   CREATININE  4.5*  5.3*   GLU  93  80   MG   --   1.6   PHOS   --   3.8   CALCIUM  8.6*  8.5*   ALBUMIN  2.0*   --    PROT  7.7   --    ALKPHOS  100   --    ALT  23   --    AST  16   --    BILITOT  0.6   --        INR  Recent Labs      03/09/17   0928  03/10/17   0926   INR  1.7*  1.9*   APTT  29.1   --            Diagnostic Studies:      EKG:    Sinus tachycardia  Nonspecific intraventricular block  T wave abnormality, consider lateral ischemia  Abnormal ECG  When compared with ECG of 20-FEB-2017 03:44,  No significant change was found    Confirmed by Esthela Pan MD ([id number]) on 3/9/2017 12:31:43 PM    Referred By: UNKNOWN REFERRING           Confirmed By:Esthela Pan MD    2D Echo:  CONCLUSIONS     1 - Upper limit of normal left ventricular enlargement.     2 - Moderately depressed left ventricular systolic function (EF 30-35%).     3 - Concentric hypertrophy.     4 - There is an increased density to the myocardium, suggesting a myocardial infiltrative process (clinical correlation required).     5 - Right atrial enlargement.     6 - Right ventricular hypertrophy.     7 - Moderately to severely depressed right ventricular systolic function .     8 - Aortic valve prosthesis, effective prosthetic valve area corrected for BSA is 1.07 cm2.     9 - Trivial to mild aortic regurgitation.     10 - Mild to moderate tricuspid regurgitation.     11 - Trivial pericardial effusion with thickened pericardium .     12 - Increased central venous pressure.     13 - Atrial septal aneurysm .     14  - Left pleural effusion.     15 - Pulmonary hypertension. The estimated PA systolic pressure is 53 mmHg.             This document has been electronically    SIGNED BY: Wilmer Mccord MD On: 03/10/2017 16:38    [facility] Anesthesia Evaluation    I have reviewed the Patient Summary Reports.    I have reviewed the Nursing Notes.   I have reviewed the Medications.     Review of Systems  Anesthesia Hx:  No problems with previous Anesthesia  Neg history of prior surgery. Denies Family Hx of Anesthesia complications.   Denies Personal Hx of Anesthesia complications.   Hematology/Oncology:  Hematology Normal   Oncology Normal     EENT/Dental:EENT/Dental Normal   Cardiovascular:   Hypertension Valvular problems/Murmurs (s/p replacement), AS CHF ECG has been reviewed. pulm HTN    Renal/:   Chronic Renal Disease, CRI, Dialysis    Hepatic/GI:   Hepatitis, B    Neurological:  Neurology Normal        Physical Exam  General:  Well nourished    Airway/Jaw/Neck:  Airway Findings: Mouth Opening: Normal Tongue: Normal  General Airway Assessment: Adult  Mallampati: II  Improves to I with phonation.  TM Distance: Normal, at least 6 cm     Eyes/Ears/Nose:  EYES/EARS/NOSE FINDINGS: Normal   Dental:  Dental Findings:    Chest/Lungs:  Chest/Lungs Findings: Clear to auscultation, Normal Respiratory Rate         Mental Status:  Mental Status Findings:  Cooperative, Alert and Oriented         Anesthesia Plan  Type of Anesthesia, risks & benefits discussed:  Anesthesia Type:  general  Patient's Preference:   Intra-op Monitoring Plan: standard ASA monitors  Intra-op Monitoring Plan Comments:   Post Op Pain Control Plan:   Post Op Pain Control Plan Comments:   Induction:   IV  Beta Blocker:  Patient is on a Beta-Blocker and has received one dose within the past 24 hours (No further documentation required).       Informed Consent: Patient understands risks and agrees with Anesthesia plan.  Questions answered.   ASA Score: 3     Day of Surgery Review of  History & Physical:    H&P update referred to the surgeon.         Ready For Surgery From Anesthesia Perspective.

## 2017-03-11 NOTE — PLAN OF CARE
Problem: Patient Care Overview  Goal: Plan of Care Review  Outcome: Ongoing (interventions implemented as appropriate)  Pt remains free from falls and injury. Plan of care reviewed with pt. Pt understands plan. Pt denies pain, VSS.Pt went to dialysis this morning. 1.5L removed. Pt remains on heparin drip. Waiting for lab results of current INR. Will continue to monitor.

## 2017-03-11 NOTE — PROGRESS NOTES
Pt here to [facility] for maintenance dialysis via Rt.SC permcath. Received report from [facility] 3rd floor staff RN

## 2017-03-11 NOTE — PLAN OF CARE
Problem: Patient Care Overview  Goal: Plan of Care Review  Outcome: Ongoing (interventions implemented as appropriate)  Pt. Remains free from falls/ injury/trauma. No complaints of CP, SOB, pain/discomfort. Thoracentesis performed today. 1500 mL was taken off. Patient is schedule for an ablation on Monday. Labetalol given to keep him rate controlled. Heparin gtt infusing for sub therapeutic INR. Plan of Care reviewed with patient. VSS and NADN. Will continue to monitor.

## 2017-03-11 NOTE — ASSESSMENT & PLAN NOTE
-BP improving, on amlodipine 10 mg PO daily, clonidine 0.1 mg BID, hydralazine 100 mg TID, and  labetalol increased to 400 mg TID.    - attempting to wean off clonidine if possible by increasing other afterload meds.  - episode of hypotension with dialysis this am. Meds are usually held, but he got hydralazine and labetolol at 0549 this am.

## 2017-03-11 NOTE — NURSING
Sp thoracentesis, vss, denies complaints of pain /sob, band aid to right upper back intact. Will continue to monitor.

## 2017-03-11 NOTE — PROGRESS NOTES
"[facility] Hospital Medicine  Progress Note    Patient Name: Isidro White  MRN: [id number]  Patient Class: IP- Inpatient   Admission Date: 3/9/2017  Length of Stay: 2 days  Attending Physician: Jose Bond MD  Primary Care Provider: Provider [facility] Medicine Team: [facility] CYNTHIA MED ALMA Knox NP    Subjective:     Principal Problem:Acute combined systolic and diastolic heart failure    HPI:  Mr. White is a 48 yo AAM who has PMH of AV replacement and TV repair on February 8, 2017, ASD closure at age 8, ESRD on HD M/W/F, pulmonary hypertension, and HTN. He presented to the ED from [facility] after being seen for SVT and given verapamil. On arrival to the ED he is noted to be in sinus tach around 105-110. He reports a similar episode earlier in the week 3/7/2017 where he was seen at [facility] for what he describes as a fast then slowing then fast then slowing heart rate, he was treated with coreg and sent home. This morning around 0200/0300 he was awoken by the same fast then slowing then fast again heart rate, he asked for assistance from his brother and they checked his blood pressure and heart rate which they report as being heart rate 179, and . Thus prompting his visit to the hospital today. He denies stabbing chest pain, but does however report chest tightness and shortness of breath. He denies any radiation of the midsternal chest tightness. He denies headache, cough, fever, chills, night sweats, N/V/D. He reports strict adherence to renal diet and "32 ounce" fluid restriction. He denies any caffeine intake or energy drink use. He denies anxiety in the past, but endorses stress over the last month with all his medical issues.     He also reports that on 3/4/17 he started to have bleeding from his mouth, nose, and ears. He was seen again at [facility] and given 10 mL of vitamin K for an INR level of 5, per brother. He has had no " further bleeding and denies tarry stools or blood in stools. As an ESRD patient he was dialyzed per his schedule on Wednesday 3/8/2017. He has a vas-cath in the right upper chest and denies bleeding from this site. He reports urinating 2-3 times per day yellow urine, denies hematuria.     He is being admitted to the Hospital Medicine Service for further evaluation.       Hospital Course:  Admitted to Hospital Medicine Service management, for acute SVT and Acute anemia, he was transfused two units of blood, and was placed on heparin gtt for sub therapeutic INR of 1.7 in setting of recent mechanical AVR on 2/8/2017 (goal INR 2.0-3.0). He had significant bilateral pleural effusion's and a recent INR of 5, and trouble breathing.  We consulted pulmonary who initially stated no thoracentesis as they felt it was from heart failure and to diurese with dialysis and IV diuresis. However since dialysis did not appear to touch the effusion and due to it's size, pulm opted to perform a thoracentesis o/n for 1500ml of serosang to sang.  His h/h dropped o/n despite fluid removal and thoracentesis.  His breath sounds haven't improved much, will get repeat CXR to see if it's re-accumulated and possibly bleeding into his chest.  He has severe pleuritic pain on that R side.   His sternal chest pain at incisional site is controlled with current pain med regimen. He dropped his pressure in dialysis and required albumen to resuscitate.  He was seen by EP and the plan is for EP study Monday if he's stable. He will not be dialyzed in am however he will on Monday. Nephrology is following. Aggressively treating his b/p per EP rec's and now new findings on Echo of 30-35%, RAP 15, PAP 53 (on sildenafil), LAVERNE, RVH, increased density to myocardium, atrial septal aneurysm bulging to the L.  Cardiology consulted to follow along and discussed consulting HTS for his PH and BIV failure post AVR.      Interval History:     Review of Systems    Constitutional: Positive for fatigue. Negative for chills, diaphoresis, fever and unexpected weight change.   Respiratory: Positive for shortness of breath (+ pleuritic pain R chest). Negative for cough, chest tightness and wheezing.    Cardiovascular: Positive for chest pain (surgical site pain, and now r pleuritic pain from thoracentesis) and palpitations (one episode prior to dialysis yesterday am. ). Negative for leg swelling.   Gastrointestinal: Negative for abdominal pain, diarrhea, nausea and vomiting.   Genitourinary: Positive for decreased urine volume (ESRD). Negative for difficulty urinating and hematuria.   Musculoskeletal: Negative for joint swelling.   Skin: Negative.    Neurological: Positive for weakness. Negative for dizziness, syncope, light-headedness and headaches.   Hematological: Negative.    Psychiatric/Behavioral: Negative for behavioral problems and sleep disturbance. The patient is not nervous/anxious.      Objective:     Vital Signs (Most Recent):  Temp: 98.3 °F (36.8 °C) (03/11/17 1100)  Pulse: 102 (03/11/17 1100)  Resp: 18 (03/11/17 1100)  BP: 125/86 (03/11/17 1100)  SpO2: 99 % (03/11/17 1100) Vital Signs (24h Range):  Temp:  [98 °F (36.7 °C)-99.4 °F (37.4 °C)] 98.3 °F (36.8 °C)  Pulse:  [] 102  Resp:  [15-18] 18  SpO2:  [92 %-100 %] 99 %  BP: ()/() 125/86     Weight: 77.2 kg (170 lb 3.1 oz)  Body mass index is 21.85 kg/(m^2).    Intake/Output Summary (Last 24 hours) at 03/11/17 1150  Last data filed at 03/11/17 1000   Gross per 24 hour   Intake           1750.1 ml   Output             5921 ml   Net          -4170.9 ml      Physical Exam   Constitutional: He is oriented to person, place, and time. No distress.   Cachetic, malnourished, bitemporal wasting     HENT:   Head: Atraumatic.   Mouth/Throat: Oropharynx is clear and moist.   Bald spot on the top posterior portion of occiput   Eyes: EOM are normal.   Neck: Normal range of motion. No JVD present. No tracheal  deviation present.   Cardiovascular: Regular rhythm and intact distal pulses.  Tachycardia present.  Exam reveals no gallop and no friction rub.    No murmur heard.  + mechanical heart tones, + ST with PAC's on tele   Pulmonary/Chest: Effort normal. No stridor. No respiratory distress. He has decreased breath sounds in the right lower field. He has no wheezes. He has no rales. He exhibits tenderness (R posterior chest site of thoracentesis).   Diminished breath sounds RML / ABSENT RLL, ABSENT LLL.    Abdominal: Soft. Bowel sounds are normal. He exhibits no distension and no mass. There is no tenderness. There is no rebound and no guarding. No hernia.   Musculoskeletal: Normal range of motion. He exhibits no edema, tenderness or deformity.   Neurological: He is alert and oriented to person, place, and time. He exhibits normal muscle tone. Coordination normal.   Skin: Skin is warm and dry. No rash noted. He is not diaphoretic. No erythema. No pallor.   Healing midline sternal incision with steri strips, no s/s of infection    Psychiatric: He has a normal mood and affect. His behavior is normal. Judgment and thought content normal.       Significant Labs:   Blood Culture:   Recent Labs  Lab 03/09/17  2151   LABBLOO No Growth to date  No Growth to date     BMP:   Recent Labs  Lab 03/10/17  0451 03/11/17  0736   GLU 80 81    137   K 3.5 4.2   CL 99 106   CO2 28 25   BUN 34* 25*   CREATININE 5.3* 4.4*   CALCIUM 8.5* 8.5*   MG 1.6  --      CBC:   Recent Labs  Lab 03/10/17  0926 03/11/17  0736   WBC 7.69 8.37   HGB 8.3* 7.8*   HCT 25.0* 24.6*    255     CMP:   Recent Labs  Lab 03/10/17  0451 03/11/17  0736    137   K 3.5 4.2   CL 99 106   CO2 28 25   GLU 80 81   BUN 34* 25*   CREATININE 5.3* 4.4*   CALCIUM 8.5* 8.5*   ALBUMIN  --  1.9*   ANIONGAP 9 6*   EGFRNONAA 12.0* 15.0*     Coagulation:   Recent Labs  Lab 03/11/17  0736   INR 1.8*     Prealbumin:   Recent Labs  Lab 03/09/17  1215   PREALBUMIN 13*      Urine Studies:   Recent Labs  Lab 03/09/17  1658   COLORU Yellow   APPEARANCEUA Clear   PHUR 8.0   SPECGRAV 1.015   PROTEINUA 2+*   GLUCUA Negative   KETONESU Negative   BILIRUBINUA Negative   OCCULTUA Negative   NITRITE Negative   UROBILINOGEN Negative   LEUKOCYTESUR Negative   RBCUA 0   WBCUA 5   BACTERIA Rare   HYALINECASTS 0       Significant Imaging: Echo: I have reviewed all pertinent results/findings within the past 24 hours and my personal findings are:  see results below    Assessment/Plan:      * Acute combined systolic and diastolic heart failure  New onset systolic heart failure along with PulmHTN, now with what appears to be volume overload as RAP are 15, new onset BIV failure, will start ACEI, titrate to b/p and may need to cut back on labetolol however that's for rate control and afterload reduction.   2d cfd echo:  1 - Upper limit of normal left ventricular enlargement.     2 - Moderately depressed left ventricular systolic function (EF 30-35%).     3 - Concentric hypertrophy.     4 - There is an increased density to the myocardium, suggesting a myocardial infiltrative process (clinical correlation required).     5 - Right atrial enlargement.     6 - Right ventricular hypertrophy.     7 - Moderately to severely depressed right ventricular systolic function .     8 - Aortic valve prosthesis, effective prosthetic valve area corrected for BSA is 1.07 cm2.     9 - Trivial to mild aortic regurgitation.     10 - Mild to moderate tricuspid regurgitation.     11 - Trivial pericardial effusion with thickened pericardium .     12 - Increased central venous pressure.     13 - Atrial septal aneurysm .     14 - Left pleural effusion.     15 - Pulmonary hypertension. The estimated PA systolic pressure is 53 mmHg.     SVT (supraventricular tachycardia)  -SVT per EKG from OSH, now in sinus tach with occasional PAC's on tele   -transfused with two units of PRBC's overnight, Iron/TIBC studies indicative of ACD,  increased volume has not decreased HR at this point, H/H improved to 8.3/25, EP recommendations are to increase labetalol as tolerated, will increase to 400 mg TID   - also new EF of 30-35% with PHTN and biV failure? R/t to tachycardia or HF causing tachycardia  -patient more receptive to RFA procedure, EP considering EP study on monday    Uncontrolled hypertension  -BP improving, on amlodipine 10 mg PO daily, clonidine 0.1 mg BID, hydralazine 100 mg TID, and  labetalol increased to 400 mg TID.    - attempting to wean off clonidine if possible by increasing other afterload meds.  - episode of hypotension with dialysis this am. Meds are usually held, but he got hydralazine and labetolol at 0549 this am.     Pleural effusion  -pulmonology initially recommended diuresis and dialysis, however post dialysis they performed a Thoracentesis for 1500ml serosang/ sang tap.  - He made 575 ml of urine overnight with 120mg lasix, will repeat after blood is infused  - h/h dropped along with hypotension with dialysis this am, will check CXR to r/o re-accumulation       Anemia of chronic disease  -receiving epoetin 10,000 units today per nephrology   - h/h dropped again, will transfuse one unit today  - iron panel appears to be ACD  - s/p thoracentesis bloody tap, ? Bleeding into chest, will f/u fluid analysis    Pulmonary HTN  -continue home dose of sildenafil 20 mg PO TID  -2D echo PaP 53, consulted cardiology, asked if we need HTS on board. They will look at patient and get back to us.     S/P AVR (aortic valve replacement)  -on heparin gtt for sub therapeutic INR of 1.8 today, restarted warfarin yesterday PM, goal 2-3 (AVR 2/8/2017), holding on increasing dose until eval for possible bleed is complete, on heparin as he has a mechanical valve.  - recent h/o INR 5, + bleeding from nose and mouth, s/p vitamin K given    Adult congenital heart disease  - ASD closure at age 8  -  Nonrheumatic aortic valve stenosis s/p AVR  2/2017    ESRD (end stage renal disease)  - dialysis today, hypotensive requiring albumen administration, nephrology following  - going to get blood today, if b/p allows will one time dose of lasix 120 mg IVP to aid in diuresis, as he has bilateral pleural effusions  - s/p thora for 1500ml ? reaccumulated      Severe protein-calorie malnutrition  - nutritional referral and dietary consult in  for dietary eduction in AM, needs teaching regarding renal diet and coumadin diet as well as appropriate fluid restriction of 1500 mL/day  -pre albumin 13 and albumin 2 added nepro renal supplements ordered and consulted nutrition      VTE Risk Mitigation         Ordered     warfarin (COUMADIN) tablet 2.5 mg  Daily     Route:  Oral        03/10/17 1403          Kika Knox NP  Department of Hospital Medicine   [facility]  X [phone]

## 2017-03-11 NOTE — PROGRESS NOTES
[facility] HEMODIALYSIS NOTE    Isidro White is a 47 y.o. male currently on hemodialysis for removal of volume.    Weekend coverage    Per sign out and request from primary team, pt was receiving ultrafiltration dialysis. He developed hypotension which responded to albumin infusion but due to hypotension only net 1.3 L UF was removed.    Of note he had around net 3 L UF yesterday during regular HD and thoracentesis of 1.5 L yesterday.    Pt denies any nausea/ dizziness/ muscle cramps/ chest pain/ shortness of breath. He did report pain related to thoracentesis.    Exam  Gen NAD  Alert and oriented x 3  Respiration regular  Neck supple  Lungs no crackles  CV S1S2+  Vitals noted    Labs:        Recent Labs  Lab 03/09/17  0928 03/10/17  0451 03/11/17  0736    136 137   K 3.5 3.5 4.2   CL 98 99 106   CO2 29 28 25   BUN 28* 34* 25*   CREATININE 4.5* 5.3* 4.4*   CALCIUM 8.6* 8.5* 8.5*   PHOS  --  3.8 2.9         Recent Labs  Lab 03/09/17 0928 03/10/17  0926 03/11/17  0736   WBC 7.73 7.69 8.37   HGB 6.6* 8.3* 7.8*   HCT 20.9* 25.0* 24.6*    257 255       Assessment/Plan:    ESRD  - seen during UF treatment which was limited due to hypotension  - d/w cardiology NP  - avoid antihypertensive agents prior to next treatment   - other components of ESRD care including anemia management, CKD MBD, acid base will be addressed during regular HD early next week

## 2017-03-11 NOTE — ASSESSMENT & PLAN NOTE
- nutritional referral and dietary consult in  for dietary eduction in AM, needs teaching regarding renal diet and coumadin diet as well as appropriate fluid restriction of 1500 mL/day  -pre albumin 13 and albumin 2  -nepro renal supplements ordered

## 2017-03-11 NOTE — PROGRESS NOTES
Cardiology Progress Note    Subjective:     Interval History: Mr. White underwent right sided thoracentesis and he still has pleuritic back and chest pain on the right side. 1500 cc bloody effusion was drained. He reports some improvement in his SOB with the tap. He underwent HD yesterday and today and he is receiving 1U of PRBC transfusion today. He denies any anginal chest pain. No dizziness, lightheadedness today. He denies epistaxis.    Meds:     Scheduled Meds:   sodium chloride 0.9%   Intravenous Once    sodium chloride 0.9%   Intravenous Once    [START ON 3/12/2017] amlodipine  5 mg Oral Daily    hydrALAZINE  100 mg Oral Q8H    INV lisinopril  10 mg Oral BID    metoprolol tartrate  100 mg Oral BID    pantoprazole  40 mg Oral Daily    sevelamer carbonate  800 mg Oral TID WM    sildenafil  20 mg Oral TID    sodium chloride 0.9%  3 mL Intravenous Q8H    warfarin  2.5 mg Oral Daily     PRN Meds:sodium chloride, sodium chloride, sodium chloride 0.9%, sodium chloride 0.9%, acetaminophen, heparin (porcine), heparin (PORCINE), heparin (PORCINE), ondansetron, ondansetron, oxycodone, polyethylene glycol, tramadol  Continuous Infusions:   heparin (porcine) in D5W 25 Units/kg/hr (03/11/17 0602)       Physical Exam:     Vitals:  Temp:  [98.3 °F (36.8 °C)-99.4 °F (37.4 °C)]   Pulse:  []   Resp:  [15-18]   BP: ()/(61-95)   SpO2:  [92 %-99 %]   I/O's:    Intake/Output Summary (Last 24 hours) at 03/11/17 1340  Last data filed at 03/11/17 1230   Gross per 24 hour   Intake           2050.1 ml   Output             2206 ml   Net           -155.9 ml        Constitutional:  NAD, conversant  HEENT:   Sclera anicteric, PERRLA, EOMI, OP clear  Neck:   JVD 10-12 cm  CV:   Tachycardic, metalic heart sound heart  Pulm:   Bibasilar decreased breath sounds.  GI:   Abdomen mildly distended  Extremities:  No LE edema, warm with palpable pulses  Skin:   No ecchymosis, erythema, or ulcers  Neuro:   AAOX3, no focal  motor deficits      Labs:     Recent Results (from the past 336 hour(s))   CBC auto differential    Collection Time: 03/11/17  7:36 AM   Result Value Ref Range    WBC 8.37 3.90 - 12.70 K/uL    Hemoglobin 7.8 (L) 14.0 - 18.0 g/dL    Hematocrit 24.6 (L) 40.0 - 54.0 %    Platelets 255 150 - 350 K/uL   CBC auto differential    Collection Time: 03/10/17  9:26 AM   Result Value Ref Range    WBC 7.69 3.90 - 12.70 K/uL    Hemoglobin 8.3 (L) 14.0 - 18.0 g/dL    Hematocrit 25.0 (L) 40.0 - 54.0 %    Platelets 257 150 - 350 K/uL   CBC auto differential    Collection Time: 03/09/17  9:28 AM   Result Value Ref Range    WBC 7.73 3.90 - 12.70 K/uL    Hemoglobin 6.6 (L) 14.0 - 18.0 g/dL    Hematocrit 20.9 (L) 40.0 - 54.0 %    Platelets 237 150 - 350 K/uL       Recent Results (from the past 336 hour(s))   Basic metabolic panel     Collection Time: 03/10/17  4:51 AM   Result Value Ref Range    Sodium 136 136 - 145 mmol/L    Potassium 3.5 3.5 - 5.1 mmol/L    Chloride 99 95 - 110 mmol/L    CO2 28 23 - 29 mmol/L    BUN, Bld 34 (H) 6 - 20 mg/dL    Creatinine 5.3 (H) 0.5 - 1.4 mg/dL    Calcium 8.5 (L) 8.7 - 10.5 mg/dL    Anion Gap 9 8 - 16 mmol/L         Recent Labs  Lab 03/09/17  0928 03/09/17  1215   TROPONINI 0.115* 0.135*       Estimated Creatinine Clearance: 22.9 mL/min (based on Cr of 4.4).    Imaging: Echo 3/10/16  1 - Upper limit of normal left ventricular enlargement.     2 - Moderately depressed left ventricular systolic function (EF 30-35%).     3 - Concentric hypertrophy.     4 - There is an increased density to the myocardium, suggesting a myocardial infiltrative process (clinical correlation required).     5 - Right atrial enlargement.     6 - Right ventricular hypertrophy.     7 - Moderately to severely depressed right ventricular systolic function .     8 - Aortic valve prosthesis, effective prosthetic valve area corrected for BSA is 1.07 cm2.     9 - Trivial to mild aortic regurgitation.     10 - Mild to moderate tricuspid  regurgitation.     11 - Trivial pericardial effusion with thickened pericardium .     12 - Increased central venous pressure.     13 - Atrial septal aneurysm .     14 - Left pleural effusion.     15 - Pulmonary hypertension. The estimated PA systolic pressure is 53 mmHg.     Telemetry:  Telemetry currently shows: Sinus tachycardia, PACs, runs of SVT.      Assessment & Plan:     47 year old male patient with a history of recent aortic valve replacement (2/8/17, 22 mm mechanical, indication: mod/severe aortic stenosis and regurgitation), tricuspid valve repair (28 mm annuloplasty), redo sternotomy, possible ASD closure as a child, ESRD on HD, pulmonary HTN, HTN. The patient was seen with the following diagnoses:     1) HFrEF / ADHF / BiV Failure  - LVEF dropped to 33% from 60% compared to his pre-surgery Echo.  - Etiology of the drop is not clear. Differential diagnosis includes CAD, hypoperfusion during surgery, tachycardia induced CMP.  - Concerning is the fact that he has moderate to severe RV failure.   - Please obtain the OSH cath pre-surgery. He may need ischemic workup once he is more stable.  - Volume overload is improving with extra HD.  - Started lisinopril 10 mg yesterday. Increase it to 10 BID today.  - Will change labetalol to metoprolol 100 BID to cover for HF. Ideally, we would avoid or use a small dose of betablocker for this patient, but he has had frequent SVTs/VTs. Therefore we have to keep him on BB.  - Continue with hydralazine 100 mg TID.   - Lasix 120 mg IV x 1 after transfusion.  - Patient was discharged on sildenafil after the surgery. Will continue with it for now because of RV failure. Will discuss with HF service on Monday regarding sildenafil.    2) S/P AVR and Tricuspid valve repair  - Surgical incision healing well.  - Anticoagulated with warfarin. Labile INR. On heparin infusion due to subtherapeutic today.  - Prosthetic valve function normal on echo.     3) Anemia  - Normocytic.   -  Likely multifactorial: Blood loss, ESRD. Iron panel does not show iron deficiency.  - Receiving 1 unit today. If hemoglobin below 8.5 on Monday, would transfuse with another unit with HD.  - Should receive EPO with HD.    4) Elevated Troponin  - Likely due to tachycardia in the setting of recent heart surgery and ESRD.  - No anginal chest pain, no new ischemic changes in ECG.  - ACS is unlikely with these findings.    5) Wide Complex Tachycardia  - Fascicular VT vs. SVT. No syncopal episodes with the tachycardia.  - No wide complex tachycardia on the monitor. However he has frequent PACs and runs of SVTs.  - EP saw the patient and recommended EP study but patient refused.  - Monitor electrolytes.  - Betablocker as above.    6) ESRD  - Nephrology following.    7) HTN  - BP on the lower side. Hiss outpatient BP regimen is not optimal.  - Stop clonidine today. Decrease amlodipine to 5 mg daily with holding parameters.  - Increase lisinopril as above.  - Nitrates are contraindicated at this point because of sildenafil. If we can get him off sildenafil, may consider isosorbide dinitrate in the future.  - Continue hydralazine.    I discussed with Dr. Mota and the primary team.    I will discuss with the attending physician. Attending addendum is to follow.    Signed:  Isidoro Barry MD  Cardiology Fellow, PGY-VI  Pager: [phone]  3/11/2017 1:40 PM    Attending Addendum:

## 2017-03-11 NOTE — ASSESSMENT & PLAN NOTE
- dialysis today, hypotensive requiring albumen administration, nephrology following  - going to get blood today, if b/p allows will one time dose of lasix 120 mg IVP to aid in diuresis, as he has bilateral pleural effusions  - s/p thora for 1500ml ? reaccumulated

## 2017-03-11 NOTE — ASSESSMENT & PLAN NOTE
-pulmonology initially recommended diuresis and dialysis, however post dialysis they performed a Thoracentesis for 1500ml serosang/ sang tap.  - He made 575 ml of urine overnight with 120mg lasix, will repeat after blood is infused  - h/h dropped along with hypotension with dialysis this am, will check CXR to r/o re-accumulation

## 2017-03-11 NOTE — ASSESSMENT & PLAN NOTE
-SVT per EKG from OSH, now in sinus tach with occasional PAC's on tele   -transfused with two units of PRBC's overnight, Iron/TIBC studies indicative of ACD, increased volume has not decreased HR at this point, H/H improved to 8.3/25, EP recommendations are to increase labetalol as tolerated, will increase to 400 mg TID   - also new EF of 30-35% with PHTN and biV failure? R/t to tachycardia or HF causing tachycardia  -patient more receptive to RFA procedure, EP considering EP study on monday

## 2017-03-11 NOTE — SUBJECTIVE & OBJECTIVE
Interval History:     Review of Systems   Constitutional: Positive for fatigue. Negative for chills, diaphoresis, fever and unexpected weight change.   Respiratory: Positive for shortness of breath (+ pleuritic pain R chest). Negative for cough, chest tightness and wheezing.    Cardiovascular: Positive for chest pain (surgical site pain, and now r pleuritic pain from thoracentesis) and palpitations (one episode prior to dialysis yesterday am. ). Negative for leg swelling.   Gastrointestinal: Negative for abdominal pain, diarrhea, nausea and vomiting.   Genitourinary: Positive for decreased urine volume (ESRD). Negative for difficulty urinating and hematuria.   Musculoskeletal: Negative for joint swelling.   Skin: Negative.    Neurological: Positive for weakness. Negative for dizziness, syncope, light-headedness and headaches.   Hematological: Negative.    Psychiatric/Behavioral: Negative for behavioral problems and sleep disturbance. The patient is not nervous/anxious.      Objective:     Vital Signs (Most Recent):  Temp: 98.3 °F (36.8 °C) (03/11/17 1100)  Pulse: 102 (03/11/17 1100)  Resp: 18 (03/11/17 1100)  BP: 125/86 (03/11/17 1100)  SpO2: 99 % (03/11/17 1100) Vital Signs (24h Range):  Temp:  [98 °F (36.7 °C)-99.4 °F (37.4 °C)] 98.3 °F (36.8 °C)  Pulse:  [] 102  Resp:  [15-18] 18  SpO2:  [92 %-100 %] 99 %  BP: ()/() 125/86     Weight: 77.2 kg (170 lb 3.1 oz)  Body mass index is 21.85 kg/(m^2).    Intake/Output Summary (Last 24 hours) at 03/11/17 1150  Last data filed at 03/11/17 1000   Gross per 24 hour   Intake           1750.1 ml   Output             5921 ml   Net          -4170.9 ml      Physical Exam   Constitutional: He is oriented to person, place, and time. No distress.   Cachetic, malnourished, bitemporal wasting     HENT:   Head: Atraumatic.   Mouth/Throat: Oropharynx is clear and moist.   Bald spot on the top posterior portion of occiput   Eyes: EOM are normal.   Neck: Normal range of  motion. No JVD present. No tracheal deviation present.   Cardiovascular: Regular rhythm and intact distal pulses.  Tachycardia present.  Exam reveals no gallop and no friction rub.    No murmur heard.  + mechanical heart tones, + ST with PAC's on tele   Pulmonary/Chest: Effort normal. No stridor. No respiratory distress. He has decreased breath sounds in the right lower field. He has no wheezes. He has no rales. He exhibits tenderness (R posterior chest site of thoracentesis).   Diminished breath sounds RML / ABSENT RLL, ABSENT LLL.    Abdominal: Soft. Bowel sounds are normal. He exhibits no distension and no mass. There is no tenderness. There is no rebound and no guarding. No hernia.   Musculoskeletal: Normal range of motion. He exhibits no edema, tenderness or deformity.   Neurological: He is alert and oriented to person, place, and time. He exhibits normal muscle tone. Coordination normal.   Skin: Skin is warm and dry. No rash noted. He is not diaphoretic. No erythema. No pallor.   Healing midline sternal incision with steri strips, no s/s of infection    Psychiatric: He has a normal mood and affect. His behavior is normal. Judgment and thought content normal.       Significant Labs:   Blood Culture:   Recent Labs  Lab 03/09/17  2151   LABBLOO No Growth to date  No Growth to date     BMP:   Recent Labs  Lab 03/10/17  0451 03/11/17  0736   GLU 80 81    137   K 3.5 4.2   CL 99 106   CO2 28 25   BUN 34* 25*   CREATININE 5.3* 4.4*   CALCIUM 8.5* 8.5*   MG 1.6  --      CBC:   Recent Labs  Lab 03/10/17  0926 03/11/17  0736   WBC 7.69 8.37   HGB 8.3* 7.8*   HCT 25.0* 24.6*    255     CMP:   Recent Labs  Lab 03/10/17  0451 03/11/17  0736    137   K 3.5 4.2   CL 99 106   CO2 28 25   GLU 80 81   BUN 34* 25*   CREATININE 5.3* 4.4*   CALCIUM 8.5* 8.5*   ALBUMIN  --  1.9*   ANIONGAP 9 6*   EGFRNONAA 12.0* 15.0*     Coagulation:   Recent Labs  Lab 03/11/17  0736   INR 1.8*     Prealbumin:   Recent  Labs  Lab 03/09/17  1215   PREALBUMIN 13*     Urine Studies:   Recent Labs  Lab 03/09/17  1658   COLORU Yellow   APPEARANCEUA Clear   PHUR 8.0   SPECGRAV 1.015   PROTEINUA 2+*   GLUCUA Negative   KETONESU Negative   BILIRUBINUA Negative   OCCULTUA Negative   NITRITE Negative   UROBILINOGEN Negative   LEUKOCYTESUR Negative   RBCUA 0   WBCUA 5   BACTERIA Rare   HYALINECASTS 0       Significant Imaging: Echo: I have reviewed all pertinent results/findings within the past 24 hours and my personal findings are:  see results below

## 2017-03-11 NOTE — PLAN OF CARE
Problem: Patient Care Overview  Goal: Plan of Care Review  Outcome: Ongoing (interventions implemented as appropriate)  Pt UF'd only removal of 1.3L fluid via RtSC dialysis access which was  heparinized on completion of treatment  Had 1 episode of hypotension easily resolved with albumin administration / pt remained asymptomatic  Report called to [facility] RN staff 3rd floor.   Pt left unit via stretcher wih escort

## 2017-03-11 NOTE — ASSESSMENT & PLAN NOTE
-receiving epoetin 10,000 units today per nephrology   - h/h dropped again, will transfuse one unit today  - iron panel appears to be ACD  - s/p thoracentesis bloody tap, ? Bleeding into chest, will f/u fluid analysis

## 2017-03-11 NOTE — ASSESSMENT & PLAN NOTE
-on heparin gtt for sub therapeutic INR of 1.8 today, restarted warfarin yesterday PM, goal 2-3 (AVR 2/8/2017), holding on increasing dose until eval for possible bleed is complete, on heparin as he has a mechanical valve.  - recent h/o INR 5, + bleeding from nose and mouth, s/p vitamin K given

## 2017-03-12 LAB
ALBUMIN SERPL BCP-MCNC: 2.1 G/DL
ANION GAP SERPL CALC-SCNC: 10 MMOL/L
BASOPHILS # BLD AUTO: 0.03 K/UL
BASOPHILS NFR BLD: 0.4 %
BUN SERPL-MCNC: 37 MG/DL
CALCIUM SERPL-MCNC: 8.7 MG/DL
CHLORIDE SERPL-SCNC: 104 MMOL/L
CO2 SERPL-SCNC: 20 MMOL/L
CREAT SERPL-MCNC: 5.4 MG/DL
DIFFERENTIAL METHOD: ABNORMAL
EOSINOPHIL # BLD AUTO: 0.4 K/UL
EOSINOPHIL NFR BLD: 5.7 %
ERYTHROCYTE [DISTWIDTH] IN BLOOD BY AUTOMATED COUNT: 17.7 %
EST. GFR  (AFRICAN AMERICAN): 13.5 ML/MIN/1.73 M^2
EST. GFR  (NON AFRICAN AMERICAN): 11.7 ML/MIN/1.73 M^2
FACT X PPP CHRO-ACNC: 0.27 IU/ML
GLUCOSE SERPL-MCNC: 95 MG/DL
HCT VFR BLD AUTO: 25.2 %
HGB BLD-MCNC: 8.5 G/DL
INR PPP: 1.9
LYMPHOCYTES # BLD AUTO: 1.4 K/UL
LYMPHOCYTES NFR BLD: 18.5 %
MAGNESIUM SERPL-MCNC: 2.1 MG/DL
MCH RBC QN AUTO: 30.6 PG
MCHC RBC AUTO-ENTMCNC: 33.7 %
MCV RBC AUTO: 91 FL
MONOCYTES # BLD AUTO: 0.8 K/UL
MONOCYTES NFR BLD: 10.5 %
NEUTROPHILS # BLD AUTO: 5 K/UL
NEUTROPHILS NFR BLD: 64.8 %
PHOSPHATE SERPL-MCNC: 3.4 MG/DL
PLATELET # BLD AUTO: 237 K/UL
PMV BLD AUTO: 9 FL
POTASSIUM SERPL-SCNC: 4.3 MMOL/L
PROTHROMBIN TIME: 19.1 SEC
RBC # BLD AUTO: 2.78 M/UL
SODIUM SERPL-SCNC: 134 MMOL/L
WBC # BLD AUTO: 7.72 K/UL

## 2017-03-12 PROCEDURE — 25000003 PHARM REV CODE 250: Performed by: EMERGENCY MEDICINE

## 2017-03-12 PROCEDURE — 85610 PROTHROMBIN TIME: CPT

## 2017-03-12 PROCEDURE — 83735 ASSAY OF MAGNESIUM: CPT

## 2017-03-12 PROCEDURE — 85520 HEPARIN ASSAY: CPT

## 2017-03-12 PROCEDURE — 25000003 PHARM REV CODE 250: Performed by: NURSE PRACTITIONER

## 2017-03-12 PROCEDURE — 85025 COMPLETE CBC W/AUTO DIFF WBC: CPT

## 2017-03-12 PROCEDURE — 80069 RENAL FUNCTION PANEL: CPT

## 2017-03-12 PROCEDURE — 25000003 PHARM REV CODE 250: Performed by: HOSPITALIST

## 2017-03-12 PROCEDURE — 36415 COLL VENOUS BLD VENIPUNCTURE: CPT

## 2017-03-12 PROCEDURE — 20600001 HC STEP DOWN PRIVATE ROOM

## 2017-03-12 PROCEDURE — 99233 SBSQ HOSP IP/OBS HIGH 50: CPT | Mod: ,,, | Performed by: NURSE PRACTITIONER

## 2017-03-12 PROCEDURE — 63600175 PHARM REV CODE 636 W HCPCS: Performed by: NURSE PRACTITIONER

## 2017-03-12 RX ORDER — LISINOPRIL 10 MG/1
10 TABLET ORAL ONCE
Status: COMPLETED | OUTPATIENT
Start: 2017-03-12 | End: 2017-03-12

## 2017-03-12 RX ORDER — TRAMADOL HYDROCHLORIDE 50 MG/1
100 TABLET ORAL EVERY 6 HOURS PRN
Status: DISCONTINUED | OUTPATIENT
Start: 2017-03-12 | End: 2017-03-17

## 2017-03-12 RX ORDER — SODIUM CHLORIDE 9 MG/ML
INJECTION, SOLUTION INTRAVENOUS
Status: DISCONTINUED | OUTPATIENT
Start: 2017-03-13 | End: 2017-03-13

## 2017-03-12 RX ORDER — LISINOPRIL 20 MG/1
20 TABLET ORAL DAILY
Status: DISCONTINUED | OUTPATIENT
Start: 2017-03-13 | End: 2017-03-19 | Stop reason: HOSPADM

## 2017-03-12 RX ORDER — FUROSEMIDE 10 MG/ML
120 INJECTION INTRAMUSCULAR; INTRAVENOUS ONCE
Status: COMPLETED | OUTPATIENT
Start: 2017-03-12 | End: 2017-03-12

## 2017-03-12 RX ORDER — METOPROLOL TARTRATE 50 MG/1
150 TABLET ORAL 2 TIMES DAILY
Status: DISCONTINUED | OUTPATIENT
Start: 2017-03-12 | End: 2017-03-14

## 2017-03-12 RX ORDER — SODIUM CHLORIDE 9 MG/ML
INJECTION, SOLUTION INTRAVENOUS ONCE
Status: COMPLETED | OUTPATIENT
Start: 2017-03-13 | End: 2017-03-13

## 2017-03-12 RX ADMIN — SILDENAFIL 20 MG: 20 TABLET ORAL at 02:03

## 2017-03-12 RX ADMIN — OXYCODONE HYDROCHLORIDE 10 MG: 5 TABLET ORAL at 09:03

## 2017-03-12 RX ADMIN — WARFARIN SODIUM 2.5 MG: 2.5 TABLET ORAL at 05:03

## 2017-03-12 RX ADMIN — SEVELAMER CARBONATE 800 MG: 800 TABLET, FILM COATED ORAL at 08:03

## 2017-03-12 RX ADMIN — Medication 3 ML: at 02:03

## 2017-03-12 RX ADMIN — HYDRALAZINE HYDROCHLORIDE 100 MG: 50 TABLET ORAL at 09:03

## 2017-03-12 RX ADMIN — HYDRALAZINE HYDROCHLORIDE 100 MG: 50 TABLET ORAL at 06:03

## 2017-03-12 RX ADMIN — SEVELAMER CARBONATE 800 MG: 800 TABLET, FILM COATED ORAL at 12:03

## 2017-03-12 RX ADMIN — LISINOPRIL 10 MG: 10 TABLET ORAL at 08:03

## 2017-03-12 RX ADMIN — SILDENAFIL 20 MG: 20 TABLET ORAL at 09:03

## 2017-03-12 RX ADMIN — PANTOPRAZOLE SODIUM 40 MG: 40 TABLET, DELAYED RELEASE ORAL at 08:03

## 2017-03-12 RX ADMIN — SILDENAFIL 20 MG: 20 TABLET ORAL at 06:03

## 2017-03-12 RX ADMIN — METOPROLOL TARTRATE 150 MG: 50 TABLET, FILM COATED ORAL at 08:03

## 2017-03-12 RX ADMIN — SEVELAMER CARBONATE 800 MG: 800 TABLET, FILM COATED ORAL at 05:03

## 2017-03-12 RX ADMIN — HEPARIN SODIUM AND DEXTROSE 25 UNITS/KG/HR: 10000; 5 INJECTION INTRAVENOUS at 01:03

## 2017-03-12 RX ADMIN — FUROSEMIDE 120 MG: 10 INJECTION, SOLUTION INTRAMUSCULAR; INTRAVENOUS at 08:03

## 2017-03-12 RX ADMIN — HYDRALAZINE HYDROCHLORIDE 100 MG: 50 TABLET ORAL at 02:03

## 2017-03-12 RX ADMIN — LISINOPRIL 10 MG: 10 TABLET ORAL at 11:03

## 2017-03-12 RX ADMIN — AMLODIPINE BESYLATE 5 MG: 5 TABLET ORAL at 08:03

## 2017-03-12 RX ADMIN — METOPROLOL TARTRATE 150 MG: 50 TABLET, FILM COATED ORAL at 09:03

## 2017-03-12 NOTE — ASSESSMENT & PLAN NOTE
- nutritional referral and dietary consult in for dietary education, needs teaching regarding renal diet and coumadin diet as well as appropriate fluid restriction of 1500 mL/day  -pre albumin 13 and albumin 2  -nepro renal supplements ordered

## 2017-03-12 NOTE — PROGRESS NOTES
"[facility] Hospital Medicine  Progress Note    Patient Name: Isidro White  MRN: [id number]  Patient Class: IP- Inpatient   Admission Date: 3/9/2017  Length of Stay: 3 days  Attending Physician: Jose Bond MD  Primary Care Provider: Provider [facility] Medicine Team: [facility] ALMA Knox NP    Subjective:     Principal Problem:Acute combined systolic and diastolic heart failure    HPI:  Mr. White is a 46 yo AAM who has PMH of AV replacement and TV repair on February 8, 2017, ASD closure at age 8, ESRD on HD M/W/F, pulmonary hypertension, and HTN. He presented to the ED from [facility] after being seen for SVT and given verapamil. On arrival to the ED he is noted to be in sinus tach around 105-110. He reports a similar episode earlier in the week 3/7/2017 where he was seen at [facility] for what he describes as a fast then slowing then fast then slowing heart rate, he was treated with coreg and sent home. This morning around 0200/0300 he was awoken by the same fast then slowing then fast again heart rate, he asked for assistance from his brother and they checked his blood pressure and heart rate which they report as being heart rate 179, and . Thus prompting his visit to the hospital today. He denies stabbing chest pain, but does however report chest tightness and shortness of breath. He denies any radiation of the midsternal chest tightness. He denies headache, cough, fever, chills, night sweats, N/V/D. He reports strict adherence to renal diet and "32 ounce" fluid restriction. He denies any caffeine intake or energy drink use. He denies anxiety in the past, but endorses stress over the last month with all his medical issues.     He also reports that on 3/4/17 he started to have bleeding from his mouth, nose, and ears. He was seen again at [facility] and given 10 mL of vitamin K for an INR level of 5, per brother. He has had no " further bleeding and denies tarry stools or blood in stools. As an ESRD patient he was dialyzed per his schedule on Wednesday 3/8/2017. He has a vas-cath in the right upper chest and denies bleeding from this site. He reports urinating 2-3 times per day yellow urine, denies hematuria.     He is being admitted to the Hospital Medicine Service for further evaluation.       Hospital Course:  Admitted to Hospital Medicine Service management, for acute SVT and Acute anemia, he was transfused two units of blood, and was placed on heparin gtt for sub therapeutic INR of 1.7 in setting of recent mechanical AVR on 2/8/2017 (goal INR 2.0-3.0). He had significant bilateral pleural effusion's and a recent INR of 5, and trouble breathing.  We consulted pulmonary who initially stated no thoracentesis as they felt it was from heart failure and to diurese with dialysis and IV diuresis. However since dialysis did not appear to touch the effusion and due to it's size, pulm opted to perform a thoracentesis for 1500ml of serosang to sang.  His h/h dropped o/n despite fluid removal and thoracentesis and concern was for rebleeding into the chest and he was hypotensive with dialysis (which necessitated albumin infusion) despite being hypertensive the day before.  He was transfused another unit of PRBC with plan to possibly transfuse with next dialysis session as he's volume overloaded as well with new onset heart failure.  His breath sounds haven't improved much, repeat CXR shows some improvement with continued RLL effusion,however the official read makes it appear that it's worsening. He has severe pleuritic pain on that R side 7/10.   His sternal chest pain at incisional site is controlled with current pain med regimen. Pulm appears to have sent fluid analysis however there are no specimen's in the lab.  As for his SVT, he was seen by EP and the plan is for EP study Monday if he's stable and his h/h is stable. He will be dialyzed on  Monday so will need to coordinate care with Nephrology whose following. His HR and b/p were being aggressively treated successfully with labetolol as this is what he came in on and per EP rec's, however in light of new findings on Echo of 30-35%, RAP 15, PAP 53 (on sildenafil), LAVERNE, RVH, increased density to myocardium, atrial septal aneurysm bulging to the L, cardiology was consulted and they feel he would best be served with switching to metoprolol for rate control as labetalol is not an approved HF med and that Metop would be better rate control than coreg and that we can adjust other meds for his HF/ afterload reduction with the goal of removing him off the clonidine permanently.    Cardiology will discuss PH meds with HTS in light of his new valve and HF findings.  Found an old echo that had PAP of 110 mmHG prior to surgery, post surgery's dropped however his RV needs to to equilibrate and remodel.           Interval History: Patient feeling better now that he was able to sleep and was able to take a shower.  He's mostly hiding in his room in the dark, not ambulating halls despite encouragement. Appears depressed.     Review of Systems   Constitutional: Positive for fatigue. Negative for chills, diaphoresis, fever and unexpected weight change.   Respiratory: Positive for shortness of breath (+ pleuritic pain R chest). Negative for cough, chest tightness and wheezing.    Cardiovascular: Positive for chest pain (surgical site pain, and now r pleuritic pain from thoracentesis). Negative for palpitations (one episode prior to dialysis yesterday am. ) and leg swelling.   Gastrointestinal: Negative for abdominal pain, diarrhea, nausea and vomiting.   Genitourinary: Positive for decreased urine volume (ESRD). Negative for difficulty urinating and hematuria.   Musculoskeletal: Negative for joint swelling.   Skin: Negative.    Neurological: Positive for weakness. Negative for dizziness, syncope, light-headedness and  headaches.   Hematological: Negative.    Psychiatric/Behavioral: Negative for behavioral problems and sleep disturbance. The patient is not nervous/anxious.      Objective:     Vital Signs (Most Recent):  Temp: 98.5 °F (36.9 °C) (03/12/17 0843)  Pulse: (!) 112 (03/12/17 1219)  Resp: 18 (03/12/17 1219)  BP: 124/82 (03/12/17 1219)  SpO2: 98 % (03/12/17 1219) Vital Signs (24h Range):  Temp:  [98.5 °F (36.9 °C)-99.5 °F (37.5 °C)] 98.5 °F (36.9 °C)  Pulse:  [] 112  Resp:  [18] 18  SpO2:  [94 %-98 %] 98 %  BP: (118-132)/(64-87) 124/82     Weight: 77.2 kg (170 lb 3.1 oz)  Body mass index is 21.85 kg/(m^2).    Intake/Output Summary (Last 24 hours) at 03/12/17 1256  Last data filed at 03/12/17 1200   Gross per 24 hour   Intake              780 ml   Output              775 ml   Net                5 ml      Physical Exam   Constitutional: He is oriented to person, place, and time. No distress.   Cachetic, malnourished, bitemporal wasting     HENT:   Head: Atraumatic.   Mouth/Throat: Oropharynx is clear and moist.   Bald spot on the top posterior portion of occiput   Eyes: EOM are normal.   Neck: Normal range of motion. No JVD present. No tracheal deviation present.   Cardiovascular: Regular rhythm and intact distal pulses.  Tachycardia present.  Exam reveals no gallop and no friction rub.    No murmur heard.  + mechanical heart tones, + ST with PAC's on tele   Pulmonary/Chest: Effort normal. No stridor. No respiratory distress. He has decreased breath sounds in the right lower field. He has no wheezes. He has no rales. He exhibits tenderness (R posterior chest site of thoracentesis).   Diminished breath sounds RML / ABSENT RLL, ABSENT LLL.    Abdominal: Soft. Bowel sounds are normal. He exhibits no distension and no mass. There is no tenderness. There is no rebound and no guarding. No hernia.   Musculoskeletal: Normal range of motion. He exhibits no edema, tenderness or deformity.   Neurological: He is alert and oriented  to person, place, and time. He exhibits normal muscle tone. Coordination normal.   Skin: Skin is warm and dry. No rash noted. He is not diaphoretic. No erythema. No pallor.   Healing midline sternal incision with steri strips, no s/s of infection    Psychiatric: He has a normal mood and affect. His behavior is normal. Judgment and thought content normal.       Significant Labs:   BMP:   Recent Labs  Lab 03/12/17  0553   GLU 95   *   K 4.3      CO2 20*   BUN 37*   CREATININE 5.4*   CALCIUM 8.7   MG 2.1     CBC:   Recent Labs  Lab 03/11/17  0736 03/12/17  0553   WBC 8.37 7.72   HGB 7.8* 8.5*   HCT 24.6* 25.2*    237     CMP:   Recent Labs  Lab 03/11/17  0736 03/12/17  0553    134*   K 4.2 4.3    104   CO2 25 20*   GLU 81 95   BUN 25* 37*   CREATININE 4.4* 5.4*   CALCIUM 8.5* 8.7   ALBUMIN 1.9* 2.1*   ANIONGAP 6* 10   EGFRNONAA 15.0* 11.7*     Significant Imaging: CXR: I have reviewed all pertinent results/findings within the past 24 hours and my personal findings are:  appears to have better aeration post thoracentesis with still some haziness ? reaccumulation of exudative vs transudative fluid    Assessment/Plan:      * Acute combined systolic and diastolic heart failure  New onset systolic heart failure along with long standing PulmHTN from congential AS, now with what appears to be volume overload as RAP are 15, new onset BIV failure, tolerating ACEI  - labetolol was changed to metoprolol as stated above.  - weaned off clonidine  - on sildenafil for PH, will consult [facility] Monday for PH rec's moving forward  - added amlodipine 5 and continue hydralazine 100mg tid  - - makes some urine, responds to 120mg lasix iv, attempting diuresis along with dialysis, since no dialysis today.   2d cfd echo:  1 - Upper limit of normal left ventricular enlargement.     2 - Moderately depressed left ventricular systolic function (EF 30-35%).     3 - Concentric hypertrophy.     4 - There is an increased  density to the myocardium, suggesting a myocardial infiltrative process (clinical correlation required).     5 - Right atrial enlargement.     6 - Right ventricular hypertrophy.     7 - Moderately to severely depressed right ventricular systolic function .     8 - Aortic valve prosthesis, effective prosthetic valve area corrected for BSA is 1.07 cm2.     9 - Trivial to mild aortic regurgitation.     10 - Mild to moderate tricuspid regurgitation.     11 - Trivial pericardial effusion with thickened pericardium .     12 - Increased central venous pressure.     13 - Atrial septal aneurysm .     14 - Left pleural effusion.     15 - Pulmonary hypertension. The estimated PA systolic pressure is 53 mmHg.     SVT (supraventricular tachycardia)  -SVT per EKG from OSH, now in sinus tach with occasional PAC's on tele   - ? Anemia induced, s/p 3 units of PRBC's    -  Iron/TIBC studies indicative of ACD,   - tachycardia could be in part d/t anemia and or new onset HF  - heart rate was controlled on 400mg tid of labetalol, cards consulted, not approved in HF, coreg would not provide adequate rate control, though he's also very hypertensive.  Switched to 100mg bid metop, increased to 150mg bid this am as he's back being tachycardic again in the low 100's awaiting cards recommendations for today, b/p better controlled off clonidine, and concern that some of the HR is rebound tachy from stopping clondine.  - new EF of 30-35% with PHTN (lower than he's been in the past 53 down from 110) and biV failure? R/t to tachycardia or HF causing tachycardia  -patient more receptive to RFA procedure, EP considering EP study on monday    Uncontrolled hypertension  -BP improving, on amlodipine 5 mg PO daily, clonidine 0.1 mg BID d/c'ed, hydralazine 100 mg TID, and  labetalol changed to metoprolol for rate control.    - sildenafil for PH  - lasix 120mg today for attempt at some diuresis    Pleural effusion  -pulmonology initially recommended  diuresis and dialysis, however post dialysis they performed a Thoracentesis for 1500ml serosang/ sang tap.  - No f/u on labs or cxr post procedure, called lab no specimen's received, weekend pulm coverage not signed out, will f/u with cxr on Monday and if there is a re- accumulation as he's still diminished to absent in that mid/ lower lobes.   - IS, Cough deep breath/ heart pillow given, encouraged oob       Anemia of chronic disease  -receiving epoetin 10,000 units per nephrology   - h/h appropriate rise post 3rd unit  - iron panel appears to be ACD  - s/p thoracentesis bloody tap, common post surgical, most likely exudative combined with transudative now that he has new onset HF    Pulmonary HTN  -continue home dose of sildenafil 20 mg PO TID, for now  -2D echo PaP 53, down from 110 pre-procedure, consulted cardiology, will discuss case with HTS on Monday.   - oxygen as needed    S/P AVR (aortic valve replacement)  -on heparin gtt for sub therapeutic INR of 1.9 today, restarted warfarin at 2.5mg, goal 2-3 (AVR 2/8/2017), holding on increasing dose for now, on heparin as he has a mechanical valve. Will need to hold heparin gtt for EP procedure at 6am  - recent h/o INR 5, + bleeding from nose and mouth, s/p vitamin K given    Adult congenital heart disease  - ASD closure at age 8  -  Nonrheumatic aortic valve stenosis s/p AVR 2/2017    ESRD (end stage renal disease)  - dialysis planned for Monday   - yesterday hypotensive requiring albumen administration, was given b/p meds prior to dialysis.  May need to hold if going prior to EP study  - nephrology following      Severe protein-calorie malnutrition  - nutritional referral and dietary consult in for dietary education, needs teaching regarding renal diet and coumadin diet as well as appropriate fluid restriction of 1500 mL/day  -pre albumin 13 and albumin 2  -nepro renal supplements ordered    VTE Risk Mitigation         Ordered     warfarin (COUMADIN) tablet 2.5  mg  Daily     Route:  Oral        03/10/17 1403          Kika Knox NP  Department of Hospital Medicine   [facility]  [id number]

## 2017-03-12 NOTE — ASSESSMENT & PLAN NOTE
-receiving epoetin 10,000 units per nephrology   - h/h appropriate rise post 3rd unit  - iron panel appears to be ACD  - s/p thoracentesis bloody tap, common post surgical, most likely exudative combined with transudative now that he has new onset HF

## 2017-03-12 NOTE — ASSESSMENT & PLAN NOTE
-BP improving, on amlodipine 5 mg PO daily, clonidine 0.1 mg BID d/c'ed, hydralazine 100 mg TID, and  labetalol changed to metoprolol for rate control.    - sildenafil for PH  - lasix 120mg today for attempt at some diuresis

## 2017-03-12 NOTE — PROGRESS NOTES
Cardiology Progress Note    Subjective:     Interval History: Doing ok, no shortness of breath, no chest pain however he does have pain at site of thoracentesis. Planning for EPS tomorrow for possible fascicular VT vs. SVT.  Some hypotension during UF yesterday so terminated early.  Increasing tachycardia today as well.    Bedside IVC revealed 2.1cm with <50% collapse    Meds:     Scheduled Meds:   amlodipine  5 mg Oral Daily    hydrALAZINE  100 mg Oral Q8H    [START ON 3/13/2017] lisinopril  20 mg Oral Daily    metoprolol tartrate  150 mg Oral BID    pantoprazole  40 mg Oral Daily    sevelamer carbonate  800 mg Oral TID WM    sildenafil  20 mg Oral TID    sodium chloride 0.9%  3 mL Intravenous Q8H    warfarin  2.5 mg Oral Daily     PRN Meds:acetaminophen, heparin (porcine), heparin (PORCINE), heparin (PORCINE), ondansetron, ondansetron, oxycodone, polyethylene glycol, tramadol  Continuous Infusions:   heparin (porcine) in D5W 25 Units/kg/hr (03/12/17 1353)       Physical Exam:     Vitals:  Temp:  [98.5 °F (36.9 °C)-99.5 °F (37.5 °C)]   Pulse:  []   Resp:  [18]   BP: (118-132)/(64-87)   SpO2:  [94 %-98 %]   I/O's:    Intake/Output Summary (Last 24 hours) at 03/12/17 1511  Last data filed at 03/12/17 1400   Gross per 24 hour   Intake             1180 ml   Output              775 ml   Net              405 ml        Constitutional:  NAD, conversant  HEENT:   Sclera anicteric, PERRLA, EOMI, OP clear  Neck:   JVD 2-3cm  CV:   Tachycardic, metalic heart sound heart  Pulm:   Bibasilar decreased breath sounds.  GI:   Abdomen mildly distended  Extremities:  No LE edema, warm with palpable pulses  Skin:   No ecchymosis, erythema, or ulcers  Neuro:   AAOX3, no focal motor deficits      Labs:     Recent Results (from the past 336 hour(s))   CBC auto differential    Collection Time: 03/12/17  5:53 AM   Result Value Ref Range    WBC 7.72 3.90 - 12.70 K/uL    Hemoglobin 8.5 (L) 14.0 - 18.0 g/dL    Hematocrit  25.2 (L) 40.0 - 54.0 %    Platelets 237 150 - 350 K/uL   CBC auto differential    Collection Time: 03/11/17  7:36 AM   Result Value Ref Range    WBC 8.37 3.90 - 12.70 K/uL    Hemoglobin 7.8 (L) 14.0 - 18.0 g/dL    Hematocrit 24.6 (L) 40.0 - 54.0 %    Platelets 255 150 - 350 K/uL   CBC auto differential    Collection Time: 03/10/17  9:26 AM   Result Value Ref Range    WBC 7.69 3.90 - 12.70 K/uL    Hemoglobin 8.3 (L) 14.0 - 18.0 g/dL    Hematocrit 25.0 (L) 40.0 - 54.0 %    Platelets 257 150 - 350 K/uL       Recent Results (from the past 336 hour(s))   Basic metabolic panel     Collection Time: 03/10/17  4:51 AM   Result Value Ref Range    Sodium 136 136 - 145 mmol/L    Potassium 3.5 3.5 - 5.1 mmol/L    Chloride 99 95 - 110 mmol/L    CO2 28 23 - 29 mmol/L    BUN, Bld 34 (H) 6 - 20 mg/dL    Creatinine 5.3 (H) 0.5 - 1.4 mg/dL    Calcium 8.5 (L) 8.7 - 10.5 mg/dL    Anion Gap 9 8 - 16 mmol/L         Recent Labs  Lab 03/09/17  0928 03/09/17  1215   TROPONINI 0.115* 0.135*       Estimated Creatinine Clearance: 18.7 mL/min (based on Cr of 5.4).    Imaging: Echo 3/10/16  1 - Upper limit of normal left ventricular enlargement.     2 - Moderately depressed left ventricular systolic function (EF 30-35%).     3 - Concentric hypertrophy.     4 - There is an increased density to the myocardium, suggesting a myocardial infiltrative process (clinical correlation required).     5 - Right atrial enlargement.     6 - Right ventricular hypertrophy.     7 - Moderately to severely depressed right ventricular systolic function .     8 - Aortic valve prosthesis, effective prosthetic valve area corrected for BSA is 1.07 cm2.     9 - Trivial to mild aortic regurgitation.     10 - Mild to moderate tricuspid regurgitation.     11 - Trivial pericardial effusion with thickened pericardium .     12 - Increased central venous pressure.     13 - Atrial septal aneurysm .     14 - Left pleural effusion.     15 - Pulmonary hypertension. The estimated PA  systolic pressure is 53 mmHg.     Telemetry:  Telemetry currently shows: Sinus tachycardia, PACs, runs of SVT.      Assessment & Plan:     47 year old male patient with a history of recent aortic valve replacement (2/8/17, 22 mm mechanical, indication: mod/severe aortic stenosis and regurgitation), tricuspid valve repair (28 mm annuloplasty), redo sternotomy, possible ASD closure as a child, ESRD on HD, pulmonary HTN, HTN. The patient was seen with the following diagnoses:     1) HFrEF / ADHF / BiV Failure  - LVEF dropped to 33% from 60% compared to his pre-surgery Echo.  - Etiology of the drop is not clear. Differential diagnosis includes CAD, hypoperfusion during surgery, tachycardia induced CMP.  - Concerning is the fact that he has moderate to severe RV failure.   - Please obtain the OSH cath pre-surgery. He may need ischemic workup after EPS vs. Repeat TTE after volume removed  - Judicious use of anti-hypertensives given hypotension during HD - consider holding on HD days.  - Tachycardia possibly secondary to b-blocker transition - cont metoprolol and consider changing to Toprol.  - Continue with hydralazine 100 mg TID as tolerated with HD note as above  - Lasix and HD vs. UF per nephrology.  Appears to still have additional volume on board based upon echo.  - Patient was discharged on sildenafil after the surgery. Will continue with it for now because of RV failure. Will discuss with HF service on Monday regarding sildenafil.     2) S/P AVR and Tricuspid valve repair  - Surgical incision healing well.  - Anticoagulated with warfarin. Labile INR. On heparin infusion due to subtherapeutic today.  - Prosthetic valve function normal on echo.     3) Anemia  - Normocytic.   - Likely multifactorial: Blood loss, ESRD. Iron panel does not show iron deficiency.  - Should receive EPO with HD.    4) Elevated Troponin  - Likely due to tachycardia in the setting of recent heart surgery and ESRD.  - No anginal chest pain, no  new ischemic changes in ECG.  - ACS is unlikely with these findings.    5) Wide Complex Tachycardia  - Fascicular VT vs. SVT. No syncopal episodes with the tachycardia.  - No wide complex tachycardia on the monitor. However he has frequent PACs and runs of SVTs.  - Monitor electrolytes.  - Betablocker as above.    6) ESRD  - Nephrology following.    7) HTN  - ACEi/hydralazine/Norvasc     Attending Addendum:

## 2017-03-12 NOTE — ASSESSMENT & PLAN NOTE
- dialysis planned for Monday   - yesterday hypotensive requiring albumen administration, was given b/p meds prior to dialysis.  May need to hold if going prior to EP study  - nephrology following

## 2017-03-12 NOTE — PLAN OF CARE
Problem: Patient Care Overview  Goal: Plan of Care Review  Outcome: Ongoing (interventions implemented as appropriate)  Pt remains free from falls and injury. Plan of care reviewed with pt. Pt understands plan. Pt denies pain, VSS. Pt remains on heparin drip. Will give warfarin tonight and hold heparin in the AM pre-op per MD. Lasix 120 IV push started this morning with 150 mg metoprolol Will continue to monitor.

## 2017-03-12 NOTE — SUBJECTIVE & OBJECTIVE
Interval History: Patient feeling better now that he was able to sleep and was able to take a shower.  He's mostly hiding in his room in the dark, not ambulating halls despite encouragement. Appears depressed.     Review of Systems   Constitutional: Positive for fatigue. Negative for chills, diaphoresis, fever and unexpected weight change.   Respiratory: Positive for shortness of breath (+ pleuritic pain R chest). Negative for cough, chest tightness and wheezing.    Cardiovascular: Positive for chest pain (surgical site pain, and now r pleuritic pain from thoracentesis). Negative for palpitations (one episode prior to dialysis yesterday am. ) and leg swelling.   Gastrointestinal: Negative for abdominal pain, diarrhea, nausea and vomiting.   Genitourinary: Positive for decreased urine volume (ESRD). Negative for difficulty urinating and hematuria.   Musculoskeletal: Negative for joint swelling.   Skin: Negative.    Neurological: Positive for weakness. Negative for dizziness, syncope, light-headedness and headaches.   Hematological: Negative.    Psychiatric/Behavioral: Negative for behavioral problems and sleep disturbance. The patient is not nervous/anxious.      Objective:     Vital Signs (Most Recent):  Temp: 98.5 °F (36.9 °C) (03/12/17 0843)  Pulse: (!) 112 (03/12/17 1219)  Resp: 18 (03/12/17 1219)  BP: 124/82 (03/12/17 1219)  SpO2: 98 % (03/12/17 1219) Vital Signs (24h Range):  Temp:  [98.5 °F (36.9 °C)-99.5 °F (37.5 °C)] 98.5 °F (36.9 °C)  Pulse:  [] 112  Resp:  [18] 18  SpO2:  [94 %-98 %] 98 %  BP: (118-132)/(64-87) 124/82     Weight: 77.2 kg (170 lb 3.1 oz)  Body mass index is 21.85 kg/(m^2).    Intake/Output Summary (Last 24 hours) at 03/12/17 1256  Last data filed at 03/12/17 1200   Gross per 24 hour   Intake              780 ml   Output              775 ml   Net                5 ml      Physical Exam   Constitutional: He is oriented to person, place, and time. No distress.   Cachetic, malnourished,  bitemporal wasting     HENT:   Head: Atraumatic.   Mouth/Throat: Oropharynx is clear and moist.   Bald spot on the top posterior portion of occiput   Eyes: EOM are normal.   Neck: Normal range of motion. No JVD present. No tracheal deviation present.   Cardiovascular: Regular rhythm and intact distal pulses.  Tachycardia present.  Exam reveals no gallop and no friction rub.    No murmur heard.  + mechanical heart tones, + ST with PAC's on tele   Pulmonary/Chest: Effort normal. No stridor. No respiratory distress. He has decreased breath sounds in the right lower field. He has no wheezes. He has no rales. He exhibits tenderness (R posterior chest site of thoracentesis).   Diminished breath sounds RML / ABSENT RLL, ABSENT LLL.    Abdominal: Soft. Bowel sounds are normal. He exhibits no distension and no mass. There is no tenderness. There is no rebound and no guarding. No hernia.   Musculoskeletal: Normal range of motion. He exhibits no edema, tenderness or deformity.   Neurological: He is alert and oriented to person, place, and time. He exhibits normal muscle tone. Coordination normal.   Skin: Skin is warm and dry. No rash noted. He is not diaphoretic. No erythema. No pallor.   Healing midline sternal incision with steri strips, no s/s of infection    Psychiatric: He has a normal mood and affect. His behavior is normal. Judgment and thought content normal.       Significant Labs:   BMP:   Recent Labs  Lab 03/12/17  0553   GLU 95   *   K 4.3      CO2 20*   BUN 37*   CREATININE 5.4*   CALCIUM 8.7   MG 2.1     CBC:   Recent Labs  Lab 03/11/17  0736 03/12/17  0553   WBC 8.37 7.72   HGB 7.8* 8.5*   HCT 24.6* 25.2*    237     CMP:   Recent Labs  Lab 03/11/17  0736 03/12/17  0553    134*   K 4.2 4.3    104   CO2 25 20*   GLU 81 95   BUN 25* 37*   CREATININE 4.4* 5.4*   CALCIUM 8.5* 8.7   ALBUMIN 1.9* 2.1*   ANIONGAP 6* 10   EGFRNONAA 15.0* 11.7*     Significant Imaging: CXR: I have reviewed  all pertinent results/findings within the past 24 hours and my personal findings are:  appears to have better aeration post thoracentesis with still some haziness ? reaccumulation of exudative vs transudative fluid

## 2017-03-12 NOTE — ASSESSMENT & PLAN NOTE
-SVT per EKG from OSH, now in sinus tach with occasional PAC's on tele   - ? Anemia induced, s/p 3 units of PRBC's    -  Iron/TIBC studies indicative of ACD,   - tachycardia could be in part d/t anemia and or new onset HF  - heart rate was controlled on 400mg tid of labetalol, cards consulted, not approved in HF, coreg would not provide adequate rate control, though he's also very hypertensive.  Switched to 100mg bid metop, increased to 150mg bid this am as he's back being tachycardic again in the low 100's awaiting cards recommendations for today, b/p better controlled off clonidine, and concern that some of the HR is rebound tachy from stopping clondine.  - new EF of 30-35% with PHTN (lower than he's been in the past 53 down from 110) and biV failure? R/t to tachycardia or HF causing tachycardia  -patient more receptive to RFA procedure, EP considering EP study on monday

## 2017-03-12 NOTE — ASSESSMENT & PLAN NOTE
-pulmonology initially recommended diuresis and dialysis, however post dialysis they performed a Thoracentesis for 1500ml serosang/ sang tap.  - No f/u on labs or cxr post procedure, called lab no specimen's received, weekend pulm coverage not signed out, will f/u with cxr on Monday and if there is a re- accumulation as he's still diminished to absent in that mid/ lower lobes.   - IS, Cough deep breath/ heart pillow given, encouraged oob

## 2017-03-12 NOTE — ASSESSMENT & PLAN NOTE
-continue home dose of sildenafil 20 mg PO TID, for now  -2D echo PaP 53, down from 110 pre-procedure, consulted cardiology, will discuss case with HTS on Monday.   - oxygen as needed

## 2017-03-12 NOTE — PLAN OF CARE
Problem: Patient Care Overview  Goal: Plan of Care Review  Outcome: Ongoing (interventions implemented as appropriate)  Pt. Remains free from falls/ injury/trauma. No complaints of CP, SOB, pain/discomfort. Heparin gtt infusing. Pt. Went for dialysis today. 1.3L was removed. Pt went for Chest X-Ray. Results still pending. Heart Rate controlled with Beta blockers. Possible ablation on Monday. Plan of Care reviewed with patient. VSS and NADN. Will continue to monitor.

## 2017-03-12 NOTE — ASSESSMENT & PLAN NOTE
New onset systolic heart failure along with long standing PulmHTN from congential AS, now with what appears to be volume overload as RAP are 15, new onset BIV failure, tolerating ACEI  - labetolol was changed to metoprolol as stated above.  - weaned off clonidine  - on sildenafil for PH, will consult [facility] Monday for PH rec's moving forward  - added amlodipine 5 and continue hydralazine 100mg tid  - - makes some urine, responds to 120mg lasix iv, attempting diuresis along with dialysis, since no dialysis today.   2d cfd echo:  1 - Upper limit of normal left ventricular enlargement.     2 - Moderately depressed left ventricular systolic function (EF 30-35%).     3 - Concentric hypertrophy.     4 - There is an increased density to the myocardium, suggesting a myocardial infiltrative process (clinical correlation required).     5 - Right atrial enlargement.     6 - Right ventricular hypertrophy.     7 - Moderately to severely depressed right ventricular systolic function .     8 - Aortic valve prosthesis, effective prosthetic valve area corrected for BSA is 1.07 cm2.     9 - Trivial to mild aortic regurgitation.     10 - Mild to moderate tricuspid regurgitation.     11 - Trivial pericardial effusion with thickened pericardium .     12 - Increased central venous pressure.     13 - Atrial septal aneurysm .     14 - Left pleural effusion.     15 - Pulmonary hypertension. The estimated PA systolic pressure is 53 mmHg.

## 2017-03-12 NOTE — PROGRESS NOTES
Electrophysiology Progress Note  Attending Physician: Jose Bond MD  Hospital Day: 4     Subjective:   Interval History: Patient seen and examined, no events overnight, denied complaints. Denied CP, SOB, Cough, Fever, Chills.Feels better after dialysis , EF low 33% new for him       Medications:   Continuous Infusions:   heparin (porcine) in D5W 25 Units/kg/hr (03/11/17 2137)       Scheduled Meds:   sodium chloride 0.9%   Intravenous Once    sodium chloride 0.9%   Intravenous Once    amlodipine  5 mg Oral Daily    furosemide  120 mg Intravenous Once    hydrALAZINE  100 mg Oral Q8H    INV lisinopril  10 mg Oral BID    metoprolol tartrate  100 mg Oral BID    pantoprazole  40 mg Oral Daily    sevelamer carbonate  800 mg Oral TID WM    sildenafil  20 mg Oral TID    sodium chloride 0.9%  3 mL Intravenous Q8H    warfarin  2.5 mg Oral Daily     PRN Meds:sodium chloride, sodium chloride, sodium chloride 0.9%, sodium chloride 0.9%, acetaminophen, heparin (porcine), heparin (PORCINE), heparin (PORCINE), ondansetron, ondansetron, oxycodone, polyethylene glycol, tramadol  Objective:     Vitals:  Temp:  [98.3 °F (36.8 °C)-99.5 °F (37.5 °C)]   Pulse:  []   Resp:  [15-18]   BP: ()/(61-87)   SpO2:  [94 %-99 %]  on  I/O's:    Intake/Output Summary (Last 24 hours) at 03/12/17 0725  Last data filed at 03/11/17 2131   Gross per 24 hour   Intake             1980 ml   Output             2281 ml   Net             -301 ml        Constitutional: Appearing dyspneic  HEENT: Sclera anicteric, PERRLA, EOMI  Neck: 10-12 cm JVD, no carotid bruits  CV: RRR, no murmur, normal S1/S2  Pulm: Bibasilar crackles  GI: Abdomen soft, NTND, +BS  Extremities: Trace LE edema, warm and well perfused  Skin: No ecchymosis, erythema, or ulcers  Psych: AOx3, appropriate affect  Neuro: CNII-XII intact, no focal deficits    Labs:       Recent Labs  Lab 03/10/17  0451 03/11/17  0736 03/12/17  0553    137 134*   K 3.5 4.2  4.3   CL 99 106 104   CO2 28 25 20*   BUN 34* 25* 37*   CREATININE 5.3* 4.4* 5.4*   GLU 80 81 95   ANIONGAP 9 6* 10       Recent Labs  Lab 03/09/17 0928  03/12/17  0553   AST 16  --   --    ALT 23  --   --    ALKPHOS 100  --   --    BILITOT 0.6  --   --    ALBUMIN 2.0*  < > 2.1*   < > = values in this interval not displayed.     Recent Labs  Lab 03/10/17  0926 03/11/17  0736 03/12/17  0553   WBC 7.69 8.37 7.72   HGB 8.3* 7.8* 8.5*   HCT 25.0* 24.6* 25.2*    255 237   GRAN 70.0  5.4 71.5  6.0 64.8  5.0       Recent Labs  Lab 03/10/17  0926 03/11/17  0736 03/12/17  0553   INR 1.9* 1.8* 1.9*       Recent Labs  Lab 03/09/17 0928 03/09/17  1215   TROPONINI 0.115* 0.135*   BNP 1292*  --       Micro:   Blood Cultures  Lab Results   Component Value Date    LABBLOO No Growth to date 03/09/2017    LABBLOO No Growth to date 03/09/2017    LABBLOO No Growth to date 03/09/2017     Urine Cultures  No results found for: LABURIN    Imaging:     EF   Date Value Ref Range Status   03/10/2017 33 (A) 55 - 65    02/06/2017 60 55 - 65          Assessment&Plan:   47 y.o. male with multiple co morbidities transferred for further evaluation of wide complex tachycardia. Currently in sinus tachycardia. One of the diffrentials include VT- fascicular VT- verapamil sensitive although there was no AV disassociation, nor capture beats/fusion beats noticed. Hence recommend EP study when stable from Anemia standpoint.      Recomendations:  -EP study for SVT/VT evaluation and ablation procedure tomorrow 3/13/2017  -Continue on coumadin   -Hold heparin gtt tomorrow at 6:00 am  -NPO after midnight   -EP will follow        Tonia Lazaro MD  Cardiology Fellow  Pager: [phone]

## 2017-03-12 NOTE — PROGRESS NOTES
Patient refused midnight vitals and does not want to be disturbed between 10p and 6am. Will continue to monitor.

## 2017-03-12 NOTE — ASSESSMENT & PLAN NOTE
-on heparin gtt for sub therapeutic INR of 1.9 today, restarted warfarin at 2.5mg, goal 2-3 (AVR 2/8/2017), holding on increasing dose for now, on heparin as he has a mechanical valve. Will need to hold heparin gtt for EP procedure at 6am  - recent h/o INR 5, + bleeding from nose and mouth, s/p vitamin K given

## 2017-03-13 ENCOUNTER — ANESTHESIA (OUTPATIENT)
Dept: MEDSURG UNIT | Facility: HOSPITAL | Age: 47
DRG: 273 | End: 2017-03-13
Payer: MEDICAID

## 2017-03-13 LAB
ALBUMIN SERPL BCP-MCNC: 2 G/DL
AMYLASE, BODY FLUID: 114 U/L
ANION GAP SERPL CALC-SCNC: 12 MMOL/L
APPEARANCE FLD: NORMAL
BACTERIA UR CULT: NORMAL
BASOPHILS # BLD AUTO: 0.02 K/UL
BASOPHILS NFR BLD: 0.2 %
BLD PROD TYP BPU: NORMAL
BLD PROD TYP BPU: NORMAL
BLOOD UNIT EXPIRATION DATE: NORMAL
BLOOD UNIT EXPIRATION DATE: NORMAL
BLOOD UNIT TYPE CODE: 5100
BLOOD UNIT TYPE CODE: 5100
BLOOD UNIT TYPE: NORMAL
BLOOD UNIT TYPE: NORMAL
BODY FLD TYPE: NORMAL
BODY FLUID SOURCE AMYLASE: NORMAL
BODY FLUID SOURCE, LDH: NORMAL
BUN SERPL-MCNC: 48 MG/DL
CALCIUM SERPL-MCNC: 9 MG/DL
CHLORIDE SERPL-SCNC: 103 MMOL/L
CO2 SERPL-SCNC: 18 MMOL/L
CODING SYSTEM: NORMAL
CODING SYSTEM: NORMAL
COLOR FLD: NORMAL
CREAT SERPL-MCNC: 6.5 MG/DL
DIFFERENTIAL METHOD: ABNORMAL
DISPENSE STATUS: NORMAL
DISPENSE STATUS: NORMAL
EOSINOPHIL # BLD AUTO: 0.5 K/UL
EOSINOPHIL NFR BLD: 5.6 %
EOSINOPHIL NFR FLD MANUAL: 3 %
ERYTHROCYTE [DISTWIDTH] IN BLOOD BY AUTOMATED COUNT: 17.4 %
EST. GFR  (AFRICAN AMERICAN): 10.8 ML/MIN/1.73 M^2
EST. GFR  (NON AFRICAN AMERICAN): 9.3 ML/MIN/1.73 M^2
GLUCOSE FLD-MCNC: 90 MG/DL
GLUCOSE SERPL-MCNC: 80 MG/DL
HCT VFR BLD AUTO: 24.1 %
HGB BLD-MCNC: 8 G/DL
INR PPP: 1.7
LDH FLD L TO P-CCNC: 250 U/L
LYMPHOCYTES # BLD AUTO: 1.3 K/UL
LYMPHOCYTES NFR BLD: 15.8 %
LYMPHOCYTES NFR FLD MANUAL: 48 %
MAGNESIUM SERPL-MCNC: 2 MG/DL
MCH RBC QN AUTO: 29.9 PG
MCHC RBC AUTO-ENTMCNC: 33.2 %
MCV RBC AUTO: 90 FL
MESOTHL CELL NFR FLD MANUAL: 1 %
MONOCYTES # BLD AUTO: 1 K/UL
MONOCYTES NFR BLD: 12 %
MONOS+MACROS NFR FLD MANUAL: 31 %
NEUTROPHILS # BLD AUTO: 5.3 K/UL
NEUTROPHILS NFR BLD: 66.3 %
NEUTROPHILS NFR FLD MANUAL: 17 %
PHOSPHATE SERPL-MCNC: 3.8 MG/DL
PLATELET # BLD AUTO: 233 K/UL
PMV BLD AUTO: 9.4 FL
POTASSIUM SERPL-SCNC: 4.4 MMOL/L
PROT FLD-MCNC: 4.3 G/DL
PROTHROMBIN TIME: 17.2 SEC
RBC # BLD AUTO: 2.68 M/UL
SODIUM SERPL-SCNC: 133 MMOL/L
SPECIMEN SOURCE: NORMAL
SPECIMEN SOURCE: NORMAL
TRANS ERYTHROCYTES VOL PATIENT: NORMAL ML
TRANS ERYTHROCYTES VOL PATIENT: NORMAL ML
WBC # BLD AUTO: 8.02 K/UL
WBC # FLD: 740 /CU MM

## 2017-03-13 PROCEDURE — D9220A PRA ANESTHESIA: Mod: CRNA,,, | Performed by: NURSE ANESTHETIST, CERTIFIED REGISTERED

## 2017-03-13 PROCEDURE — 27201037 HC PRESSURE MONITORING SET UP

## 2017-03-13 PROCEDURE — 25000003 PHARM REV CODE 250: Performed by: NURSE PRACTITIONER

## 2017-03-13 PROCEDURE — 27000207 HC ISOLATION

## 2017-03-13 PROCEDURE — 85610 PROTHROMBIN TIME: CPT

## 2017-03-13 PROCEDURE — 25000003 PHARM REV CODE 250: Performed by: NURSE ANESTHETIST, CERTIFIED REGISTERED

## 2017-03-13 PROCEDURE — D9220A PRA ANESTHESIA: Mod: ANES,,, | Performed by: ANESTHESIOLOGY

## 2017-03-13 PROCEDURE — 83735 ASSAY OF MAGNESIUM: CPT

## 2017-03-13 PROCEDURE — 80100016 HC MAINTENANCE HEMODIALYSIS

## 2017-03-13 PROCEDURE — 20600001 HC STEP DOWN PRIVATE ROOM

## 2017-03-13 PROCEDURE — 37000009 HC ANESTHESIA EA ADD 15 MINS: Performed by: INTERNAL MEDICINE

## 2017-03-13 PROCEDURE — 99233 SBSQ HOSP IP/OBS HIGH 50: CPT | Mod: ,,, | Performed by: INTERNAL MEDICINE

## 2017-03-13 PROCEDURE — 93005 ELECTROCARDIOGRAM TRACING: CPT

## 2017-03-13 PROCEDURE — 37000008 HC ANESTHESIA 1ST 15 MINUTES: Performed by: INTERNAL MEDICINE

## 2017-03-13 PROCEDURE — 93010 ELECTROCARDIOGRAM REPORT: CPT | Mod: 77,,, | Performed by: INTERNAL MEDICINE

## 2017-03-13 PROCEDURE — 99223 1ST HOSP IP/OBS HIGH 75: CPT | Mod: ,,, | Performed by: INTERNAL MEDICINE

## 2017-03-13 PROCEDURE — 63600175 PHARM REV CODE 636 W HCPCS

## 2017-03-13 PROCEDURE — 4A0234Z MEASUREMENT OF CARDIAC ELECTRICAL ACTIVITY, PERCUTANEOUS APPROACH: ICD-10-PCS | Performed by: INTERNAL MEDICINE

## 2017-03-13 PROCEDURE — C1730 CATH, EP, 19 OR FEW ELECT: HCPCS

## 2017-03-13 PROCEDURE — 85025 COMPLETE CBC W/AUTO DIFF WBC: CPT

## 2017-03-13 PROCEDURE — 36415 COLL VENOUS BLD VENIPUNCTURE: CPT

## 2017-03-13 PROCEDURE — 93621 COMP EP EVL L PAC&REC C SINS: CPT | Mod: 26,,, | Performed by: INTERNAL MEDICINE

## 2017-03-13 PROCEDURE — 02K83ZZ MAP CONDUCTION MECHANISM, PERCUTANEOUS APPROACH: ICD-10-PCS | Performed by: INTERNAL MEDICINE

## 2017-03-13 PROCEDURE — 63600175 PHARM REV CODE 636 W HCPCS: Performed by: NURSE ANESTHETIST, CERTIFIED REGISTERED

## 2017-03-13 PROCEDURE — 4A023FZ MEASUREMENT OF CARDIAC RHYTHM, PERCUTANEOUS APPROACH: ICD-10-PCS | Performed by: INTERNAL MEDICINE

## 2017-03-13 PROCEDURE — 93010 ELECTROCARDIOGRAM REPORT: CPT | Mod: ,,, | Performed by: INTERNAL MEDICINE

## 2017-03-13 PROCEDURE — 02583ZZ DESTRUCTION OF CONDUCTION MECHANISM, PERCUTANEOUS APPROACH: ICD-10-PCS | Performed by: INTERNAL MEDICINE

## 2017-03-13 PROCEDURE — 25000003 PHARM REV CODE 250: Performed by: INTERNAL MEDICINE

## 2017-03-13 PROCEDURE — 25000003 PHARM REV CODE 250

## 2017-03-13 PROCEDURE — 93613 INTRACARDIAC EPHYS 3D MAPG: CPT | Mod: ,,, | Performed by: INTERNAL MEDICINE

## 2017-03-13 PROCEDURE — 93653 COMPRE EP EVAL TX SVT: CPT | Mod: ,,, | Performed by: INTERNAL MEDICINE

## 2017-03-13 PROCEDURE — 99233 SBSQ HOSP IP/OBS HIGH 50: CPT | Mod: ,,, | Performed by: NURSE PRACTITIONER

## 2017-03-13 PROCEDURE — 80069 RENAL FUNCTION PANEL: CPT

## 2017-03-13 PROCEDURE — 25000003 PHARM REV CODE 250: Performed by: EMERGENCY MEDICINE

## 2017-03-13 RX ORDER — MIDAZOLAM HYDROCHLORIDE 1 MG/ML
INJECTION, SOLUTION INTRAMUSCULAR; INTRAVENOUS
Status: DISCONTINUED | OUTPATIENT
Start: 2017-03-13 | End: 2017-03-13

## 2017-03-13 RX ORDER — SODIUM CHLORIDE 0.9 % (FLUSH) 0.9 %
3 SYRINGE (ML) INJECTION
Status: DISCONTINUED | OUTPATIENT
Start: 2017-03-13 | End: 2017-03-19 | Stop reason: HOSPADM

## 2017-03-13 RX ORDER — PROPOFOL 10 MG/ML
VIAL (ML) INTRAVENOUS
Status: DISCONTINUED | OUTPATIENT
Start: 2017-03-13 | End: 2017-03-13

## 2017-03-13 RX ORDER — PHENYLEPHRINE HYDROCHLORIDE 10 MG/ML
INJECTION INTRAVENOUS
Status: DISCONTINUED | OUTPATIENT
Start: 2017-03-13 | End: 2017-03-13

## 2017-03-13 RX ORDER — HEPARIN SODIUM,PORCINE/D5W 25000/250
23 INTRAVENOUS SOLUTION INTRAVENOUS CONTINUOUS
Status: DISCONTINUED | OUTPATIENT
Start: 2017-03-13 | End: 2017-03-16

## 2017-03-13 RX ORDER — FENTANYL CITRATE 50 UG/ML
INJECTION, SOLUTION INTRAMUSCULAR; INTRAVENOUS
Status: DISCONTINUED | OUTPATIENT
Start: 2017-03-13 | End: 2017-03-13

## 2017-03-13 RX ORDER — SODIUM CHLORIDE 0.9 % (FLUSH) 0.9 %
3 SYRINGE (ML) INJECTION EVERY 8 HOURS
Status: DISCONTINUED | OUTPATIENT
Start: 2017-03-13 | End: 2017-03-13 | Stop reason: HOSPADM

## 2017-03-13 RX ORDER — LABETALOL HYDROCHLORIDE 5 MG/ML
INJECTION, SOLUTION INTRAVENOUS
Status: DISCONTINUED | OUTPATIENT
Start: 2017-03-13 | End: 2017-03-13

## 2017-03-13 RX ORDER — SODIUM CHLORIDE 9 MG/ML
INJECTION, SOLUTION INTRAVENOUS CONTINUOUS PRN
Status: DISCONTINUED | OUTPATIENT
Start: 2017-03-13 | End: 2017-03-13

## 2017-03-13 RX ORDER — WARFARIN SODIUM 5 MG/1
5 TABLET ORAL DAILY
Status: DISCONTINUED | OUTPATIENT
Start: 2017-03-13 | End: 2017-03-15

## 2017-03-13 RX ORDER — ONDANSETRON 2 MG/ML
4 INJECTION INTRAMUSCULAR; INTRAVENOUS ONCE AS NEEDED
Status: DISCONTINUED | OUTPATIENT
Start: 2017-03-13 | End: 2017-03-13 | Stop reason: HOSPADM

## 2017-03-13 RX ADMIN — DEXMEDETOMIDINE HYDROCHLORIDE 0.5 MCG/KG/HR: 100 INJECTION, SOLUTION, CONCENTRATE INTRAVENOUS at 02:03

## 2017-03-13 RX ADMIN — KETAMINE HYDROCHLORIDE 40 MG: 100 INJECTION, SOLUTION, CONCENTRATE INTRAMUSCULAR; INTRAVENOUS at 02:03

## 2017-03-13 RX ADMIN — PROPOFOL 40 MG: 10 INJECTION, EMULSION INTRAVENOUS at 03:03

## 2017-03-13 RX ADMIN — LABETALOL HYDROCHLORIDE 20 MG: 5 INJECTION, SOLUTION INTRAVENOUS at 02:03

## 2017-03-13 RX ADMIN — PHENYLEPHRINE HYDROCHLORIDE 100 MCG: 10 INJECTION INTRAVENOUS at 03:03

## 2017-03-13 RX ADMIN — Medication 3 ML: at 10:03

## 2017-03-13 RX ADMIN — METOPROLOL TARTRATE 150 MG: 50 TABLET, FILM COATED ORAL at 08:03

## 2017-03-13 RX ADMIN — SODIUM CHLORIDE: 0.9 INJECTION, SOLUTION INTRAVENOUS at 02:03

## 2017-03-13 RX ADMIN — PROPOFOL 30 MG: 10 INJECTION, EMULSION INTRAVENOUS at 03:03

## 2017-03-13 RX ADMIN — MIDAZOLAM HYDROCHLORIDE 1 MG: 1 INJECTION INTRAMUSCULAR; INTRAVENOUS at 03:03

## 2017-03-13 RX ADMIN — MIDAZOLAM HYDROCHLORIDE 2 MG: 1 INJECTION INTRAMUSCULAR; INTRAVENOUS at 02:03

## 2017-03-13 RX ADMIN — HYDRALAZINE HYDROCHLORIDE 100 MG: 50 TABLET ORAL at 08:03

## 2017-03-13 RX ADMIN — OXYCODONE HYDROCHLORIDE 10 MG: 5 TABLET ORAL at 09:03

## 2017-03-13 RX ADMIN — PHENYLEPHRINE HYDROCHLORIDE 100 MCG: 10 INJECTION INTRAVENOUS at 04:03

## 2017-03-13 RX ADMIN — WARFARIN SODIUM 5 MG: 5 TABLET ORAL at 07:03

## 2017-03-13 RX ADMIN — FENTANYL CITRATE 50 MCG: 50 INJECTION, SOLUTION INTRAMUSCULAR; INTRAVENOUS at 03:03

## 2017-03-13 RX ADMIN — SILDENAFIL 20 MG: 20 TABLET ORAL at 08:03

## 2017-03-13 RX ADMIN — SODIUM CHLORIDE 400 ML: 0.9 INJECTION, SOLUTION INTRAVENOUS at 10:03

## 2017-03-13 RX ADMIN — SEVELAMER CARBONATE 800 MG: 800 TABLET, FILM COATED ORAL at 07:03

## 2017-03-13 NOTE — PLAN OF CARE
Problem: Patient Care Overview  Goal: Plan of Care Review  Outcome: Ongoing (interventions implemented as appropriate)  4hr hd completed, net fluid hriewdx=[id number], target goal achieved, pt tolerated well. Report given to Jeet WEAVER.

## 2017-03-13 NOTE — ANESTHESIA RELEASE NOTE
"Anesthesia Release from PACU Note    Patient: Isidro Lozano Jr Cindy    Procedure(s) Performed: Procedure(s) (LRB):  ABLATION (N/A)    Anesthesia type: general    Post pain: Adequate analgesia    Post assessment: no apparent anesthetic complications, tolerated procedure well and no evidence of recall    Last Vitals:   Visit Vitals    BP (!) 145/84    Pulse 104    Temp 37.2 °C (98.9 °F) (Axillary)    Resp 18    Ht 6' 2" (1.88 m)    Wt 77.2 kg (170 lb 3.1 oz)    SpO2 96%    BMI 21.85 kg/m2       Post vital signs: stable    Level of consciousness: awake, alert  and oriented    Nausea/Vomiting: no nausea/no vomiting    Complications: none    Airway Patency: patent    Respiratory: unassisted, spontaneous ventilation, room air    Cardiovascular: stable and blood pressure at baseline    Hydration: euvolemic  "

## 2017-03-13 NOTE — ASSESSMENT & PLAN NOTE
-SVT per EKG from OSH, [place] [facility] [place] PAC's on tele, RFA done today, see below  - ? Anemia induced, s/p 3 units of PRBC's    -  Iron/TIBC studies indicative of ACD,   - tachycardia could be in part d/t anemia and or new onset HF  - heart rate was controlled on 400mg tid of labetalol, cards consulted, not approved in HF, coreg would not provide adequate rate control, though he's also very hypertensive.  Switched to 150 mg bid metoprolol bid this am as he's back being tachycardic again in the low 100's   - new EF of 30-35% with PHTN (lower than he's been in the past 53 down from 110) and biV failure? R/t to tachycardia or HF causing tachycardia  -RFA today with findings of EPS with AT noted; aberrancy with RBBB (clinical) also noted.  Activation mapping with earliest signal near the lateral tricuspid valve - with RF tachycardia terminated and no longer inducible  -continue to monitor tele

## 2017-03-13 NOTE — PROGRESS NOTES
Maintainance HD initiated via RT IJ Tunneled catheter without difficulty, good a/v flows obtained.

## 2017-03-13 NOTE — ASSESSMENT & PLAN NOTE
-receiving epoetin 10,000 units per nephrology   -h/h 8/24 today, will continue to monitor  -iron panel appears to be ACD  -s/p thoracentesis bloody tap, common post surgical, most likely exudative combined with transudative now that he has new onset HF

## 2017-03-13 NOTE — SUBJECTIVE & OBJECTIVE
Interval History: Denies chest pain and shortness of breath today, no acute events overnight.     Review of Systems   Constitutional: Positive for fatigue. Negative for activity change, appetite change, chills, fever and unexpected weight change.   Respiratory: Positive for cough. Negative for shortness of breath.    Cardiovascular: Negative for chest pain, palpitations and leg swelling.   Gastrointestinal: Negative for abdominal pain, constipation, diarrhea, nausea and vomiting.   Neurological: Negative for dizziness, light-headedness and headaches.     Objective:     Vital Signs (Most Recent):  Temp: 98.9 °F (37.2 °C) (03/13/17 1657)  Pulse: 105 (03/13/17 1800)  Resp: (!) 23 (03/13/17 1800)  BP: 129/85 (03/13/17 1800)  SpO2: 98 % (03/13/17 1800) Vital Signs (24h Range):  Temp:  [98.3 °F (36.8 °C)-99.2 °F (37.3 °C)] 98.9 °F (37.2 °C)  Pulse:  [] 105  Resp:  [16-23] 23  SpO2:  [93 %-100 %] 98 %  BP: (129-164)/() 129/85     Weight: 77.2 kg (170 lb 3.1 oz)  Body mass index is 21.85 kg/(m^2).    Intake/Output Summary (Last 24 hours) at 03/13/17 1835  Last data filed at 03/13/17 1627   Gross per 24 hour   Intake             1240 ml   Output             4400 ml   Net            -3160 ml      Physical Exam   Constitutional: He is oriented to person, place, and time. He appears well-developed and well-nourished.   Neck: No JVD present.   Cardiovascular: Normal heart sounds and intact distal pulses.  Tachycardia present.    Pulmonary/Chest: No respiratory distress. He has decreased breath sounds in the right lower field and the left lower field.   Musculoskeletal: Normal range of motion. He exhibits no edema.   Neurological: He is oriented to person, place, and time.   Skin: Skin is warm and dry.   Psychiatric: He has a normal mood and affect.       Significant Labs:   BMP:   Recent Labs  Lab 03/13/17  0510   GLU 80   *   K 4.4      CO2 18*   BUN 48*   CREATININE 6.5*   CALCIUM 9.0   MG 2.0     CBC:    Recent Labs  Lab 03/12/17  0553 03/13/17  0510   WBC 7.72 8.02   HGB 8.5* 8.0*   HCT 25.2* 24.1*    233     CMP:   Recent Labs  Lab 03/12/17  0553 03/13/17  0510   * 133*   K 4.3 4.4    103   CO2 20* 18*   GLU 95 80   BUN 37* 48*   CREATININE 5.4* 6.5*   CALCIUM 8.7 9.0   ALBUMIN 2.1* 2.0*   ANIONGAP 10 12   EGFRNONAA 11.7* 9.3*     POCT Glucose: No results for input(s): POCTGLUCOSE in the last 48 hours.  Troponin: No results for input(s): TROPONINI in the last 48 hours.  All pertinent labs within the past 24 hours have been reviewed.    Significant Imaging: I have reviewed all pertinent imaging results/findings within the past 24 hours.

## 2017-03-13 NOTE — ANESTHESIA POSTPROCEDURE EVALUATION
"Anesthesia Post Evaluation    Patient: Isidro White    Procedure(s) Performed: Procedure(s) (LRB):  ABLATION (N/A)    Final Anesthesia Type: general  Patient location during evaluation: PACU  Patient participation: Yes- Able to Participate  Level of consciousness: awake and alert and oriented  Post-procedure vital signs: reviewed and stable  Pain management: adequate  Airway patency: patent  PONV status at discharge: No PONV  Anesthetic complications: no      Cardiovascular status: blood pressure returned to baseline  Respiratory status: unassisted and room air  Hydration status: euvolemic  Follow-up not needed.        Visit Vitals    BP (!) 145/84    Pulse 104    Temp 37.2 °C (98.9 °F) (Axillary)    Resp 18    Ht 6' 2" (1.88 m)    Wt 77.2 kg (170 lb 3.1 oz)    SpO2 96%    BMI 21.85 kg/m2       Pain/Dru Score: Pain Assessment Performed: Yes (3/13/2017  4:57 PM)  Presence of Pain: denies (3/13/2017  4:57 PM)  Pain Rating Prior to Med Admin: 5 (3/12/2017  9:19 PM)  Dru Score: 9 (3/13/2017  4:57 PM)      "

## 2017-03-13 NOTE — ASSESSMENT & PLAN NOTE
-restart heparin gtt for sub therapeutic INR, 6 hours after RFA  -increase warfarin to 5 mg, goal 2-3 (AVR 2/8/2017)  -recent h/o INR 5, + bleeding from nose and mouth, s/p vitamin K given

## 2017-03-13 NOTE — ASSESSMENT & PLAN NOTE
- dialysis today with 3 liter fluid removal   - yesterday hypotensive requiring albumen administration, was given b/p meds prior to dialysis   -BP meds held today in setting of HD and RFA with EP happening today, will monitor BP  - nephrology following

## 2017-03-13 NOTE — PROGRESS NOTES
Cardiology Progress Note    Subjective:     Interval History: Patient taken to EP lab this morning for EPS +/- ablation. Intermittent sinus tachycardia overnight up to 120s.  Normotensive, no HD yesterday however he did make 1.2L UOP with 120IV lasix per nephrology.     Bedside IVC evaluation yesterday with persistently dilated non-collapsing IVC     Meds:     Scheduled Meds:   sodium chloride 0.9%   Intravenous Once    amlodipine  5 mg Oral Daily    hydrALAZINE  100 mg Oral Q8H    lisinopril  20 mg Oral Daily    metoprolol tartrate  150 mg Oral BID    pantoprazole  40 mg Oral Daily    sevelamer carbonate  800 mg Oral TID WM    sildenafil  20 mg Oral TID    sodium chloride 0.9%  3 mL Intravenous Q8H    warfarin  2.5 mg Oral Daily     PRN Meds:sodium chloride 0.9%, acetaminophen, heparin (porcine), heparin (PORCINE), heparin (PORCINE), ondansetron, ondansetron, oxycodone, polyethylene glycol, tramadol  Continuous Infusions:   heparin (porcine) in D5W Stopped (03/13/17 0653)       Physical Exam:     Vitals:  Temp:  [98.5 °F (36.9 °C)-99.3 °F (37.4 °C)]   Pulse:  []   Resp:  [18-19]   BP: (127-164)/()   SpO2:  [93 %-98 %]   I/O's:    Intake/Output Summary (Last 24 hours) at 03/13/17 1307  Last data filed at 03/12/17 2200   Gross per 24 hour   Intake             1060 ml   Output              850 ml   Net              210 ml        Constitutional:  NAD, conversant  HEENT:   Sclera anicteric, PERRLA, EOMI, OP clear  Neck:   JVD 2-3cm  CV:   Tachycardic, metalic heart sound heart  Pulm:   Bibasilar decreased breath sounds.  GI:   Abdomen mildly distended  Extremities:  No LE edema, warm with palpable pulses  Skin:   No ecchymosis, erythema, or ulcers  Neuro:   AAOX3, no focal motor deficits      Labs:     Recent Results (from the past 336 hour(s))   CBC auto differential    Collection Time: 03/13/17  5:10 AM   Result Value Ref Range    WBC 8.02 3.90 - 12.70 K/uL    Hemoglobin 8.0 (L) 14.0 - 18.0  g/dL    Hematocrit 24.1 (L) 40.0 - 54.0 %    Platelets 233 150 - 350 K/uL   CBC auto differential    Collection Time: 03/12/17  5:53 AM   Result Value Ref Range    WBC 7.72 3.90 - 12.70 K/uL    Hemoglobin 8.5 (L) 14.0 - 18.0 g/dL    Hematocrit 25.2 (L) 40.0 - 54.0 %    Platelets 237 150 - 350 K/uL   CBC auto differential    Collection Time: 03/11/17  7:36 AM   Result Value Ref Range    WBC 8.37 3.90 - 12.70 K/uL    Hemoglobin 7.8 (L) 14.0 - 18.0 g/dL    Hematocrit 24.6 (L) 40.0 - 54.0 %    Platelets 255 150 - 350 K/uL       Recent Results (from the past 336 hour(s))   Basic metabolic panel     Collection Time: 03/10/17  4:51 AM   Result Value Ref Range    Sodium 136 136 - 145 mmol/L    Potassium 3.5 3.5 - 5.1 mmol/L    Chloride 99 95 - 110 mmol/L    CO2 28 23 - 29 mmol/L    BUN, Bld 34 (H) 6 - 20 mg/dL    Creatinine 5.3 (H) 0.5 - 1.4 mg/dL    Calcium 8.5 (L) 8.7 - 10.5 mg/dL    Anion Gap 9 8 - 16 mmol/L         Recent Labs  Lab 03/09/17  0928 03/09/17  1215   TROPONINI 0.115* 0.135*       Estimated Creatinine Clearance: 15.5 mL/min (based on Cr of 6.5).    Imaging: Echo 3/10/16  1 - Upper limit of normal left ventricular enlargement.     2 - Moderately depressed left ventricular systolic function (EF 30-35%).     3 - Concentric hypertrophy.     4 - There is an increased density to the myocardium, suggesting a myocardial infiltrative process (clinical correlation required).     5 - Right atrial enlargement.     6 - Right ventricular hypertrophy.     7 - Moderately to severely depressed right ventricular systolic function .     8 - Aortic valve prosthesis, effective prosthetic valve area corrected for BSA is 1.07 cm2.     9 - Trivial to mild aortic regurgitation.     10 - Mild to moderate tricuspid regurgitation.     11 - Trivial pericardial effusion with thickened pericardium .     12 - Increased central venous pressure.     13 - Atrial septal aneurysm .     14 - Left pleural effusion.     15 - Pulmonary  hypertension. The estimated PA systolic pressure is 53 mmHg.     Telemetry:  Telemetry currently shows: Sinus tachycardia, PACs      Assessment & Plan:     47 year old male patient with a history of recent aortic valve replacement (2/8/17, 22 mm mechanical, indication: mod/severe aortic stenosis and regurgitation), tricuspid valve repair (28 mm annuloplasty), redo sternotomy - possible ASD closure as a child, ESRD on HD since 1/30/2017, pulmonary HTN, HTN. The patient was seen with the following diagnoses:     1) HFrEF / ADHF / BiV Failure  - LVEF dropped to 33% from 60% compared to his pre-surgery Echo.  - Please obtain the OSH cath pre-surgery, reported as normal but would like official result.  - Reviewed echo today and LV thickened with dysfunction prior to surgery despite normal EF, thus drop in EF more likely exacerbation of underlying cardiomyopathy as a result of volume or tachycardia.  Repeat TTE when euvolemic.  - Sinus tachycardia possibly secondary to b-blocker transition vs. Cardiomyopathy - cino-z-ivysuvp and volume removal per nephrology.  - Would consider changing hydralazine to 100 BID for less aggressive blood pressure control so he can tolerate HD  - Decrease lisinopril to 10 for permissive hypertension as above  - Also discussed today the possibility exists that this could be post-pericardectomy syndrome - that is a post-operative inflammatory reaction in which case there would be a role for colchicine.  CRP elevated on admission.  Will check ESR and consider this addition.  - Patient was discharged on sildenafil after the surgery. Will continue with it for now because of RV failure - however unclear whether it is indicated in this situation.     2) S/P AVR and Tricuspid valve repair  - Surgical incision healing well.  - Anticoagulated with warfarin. Labile INR. On heparin infusion due to subtherapeutic INR.  - Prosthetic valve function normal on echo.     3) Anemia  - Normocytic.   - Likely  multifactorial: Blood loss, ESRD. Iron panel does not show iron deficiency.  - Should receive EPO with HD.    4) Elevated Troponin  - Likely due to tachycardia in the setting of recent heart surgery and ESRD.  - No anginal chest pain, no new ischemic changes in ECG.  - ACS is unlikely with these findings.    5) Wide Complex Tachycardia  - Fascicular VT vs. SVT. No syncopal episodes with the tachycardia.  - No wide complex tachycardia on the monitor. However he has frequent PACs and runs of SVTs.  - Monitor electrolytes.  - Betablocker as above.  - EPS today    6) ESRD  - Nephrology following.    7) HTN  - as above recommend weaning lisinopril and hydralazine for permission hypertension during HD.    Attending Addendum:

## 2017-03-13 NOTE — ASSESSMENT & PLAN NOTE
-BP improving, on amlodipine 5 mg PO daily, hydralazine 100 mg TID, and labetalol changed to metoprolol for rate control.    -clonidine 0.1 mg BID d/c'ed  - sildenafil for PH

## 2017-03-13 NOTE — ASSESSMENT & PLAN NOTE
-pulmonology initially recommended diuresis and dialysis, however post dialysis they performed a thoracentesis for 1500ml serosang/ sang tap.  - No f/u on labs or cxr post procedure, called lab no specimen's received, weekend pulm coverage not signed out, CXR pending  - IS, Cough deep breath/ heart pillow given, encouraged oob

## 2017-03-13 NOTE — PROCEDURES
Bilateral groins x 2 in the CFV.    EPS with AT noted; aberrancy with RBBB (clinical) also noted.  Activation mapping with earliest signal near the lateral tricuspid valve - with RF tachycardia terminated and no longer inducible.    Patient tolerated procedure well.    Plan:  Bed rest.  Groin protocol.  Resume heparin in 6 hours if no groin issues; continue coumadin.  Consider bringing back HD catheter, as clearly in RA and may be contributing to his ectopy.    Gerald Jon MD

## 2017-03-13 NOTE — PROGRESS NOTES
"[facility] Hospital Medicine  Progress Note    Patient Name: Isidro White  MRN: [id number]  Patient Class: IP- Inpatient   Admission Date: 3/9/2017  Length of Stay: 4 days  Attending Physician: Jose Bond MD  Primary Care Provider: Provider [facility] Medicine Team: [facility] HOSP MED J Stacy Flynn NP    Subjective:     Principal Problem:Acute combined systolic and diastolic heart failure    HPI:  Mr. White is a 48 yo AAM who has PMH of AV replacement and TV repair on February 8, 2017, ASD closure at age 8, ESRD on HD M/W/F, pulmonary hypertension, and HTN. He presented to the ED from [facility] after being seen for SVT and given verapamil. On arrival to the ED he is noted to be in sinus tach around 105-110. He reports a similar episode earlier in the week 3/7/2017 where he was seen at [facility] for what he describes as a fast then slowing then fast then slowing heart rate, he was treated with coreg and sent home. This morning around 0200/0300 he was awoken by the same fast then slowing then fast again heart rate, he asked for assistance from his brother and they checked his blood pressure and heart rate which they report as being heart rate 179, and . Thus prompting his visit to the hospital today. He denies stabbing chest pain, but does however report chest tightness and shortness of breath. He denies any radiation of the midsternal chest tightness. He denies headache, cough, fever, chills, night sweats, N/V/D. He reports strict adherence to renal diet and "32 ounce" fluid restriction. He denies any caffeine intake or energy drink use. He denies anxiety in the past, but endorses stress over the last month with all his medical issues.     He also reports that on 3/4/17 he started to have bleeding from his mouth, nose, and ears. He was seen again at [facility] and given 10 mL of vitamin K for an INR level of 5, per brother. He has had no " further bleeding and denies tarry stools or blood in stools. As an ESRD patient he was dialyzed per his schedule on Wednesday 3/8/2017. He has a vas-cath in the right upper chest and denies bleeding from this site. He reports urinating 2-3 times per day yellow urine, denies hematuria.     He is being admitted to the Hospital Medicine Service for further evaluation.       Hospital Course:  Admitted to Hospital Medicine Service management, for acute SVT and acute anemia, he was transfused two units of blood, and was placed on heparin gtt for sub therapeutic INR of 1.7 in setting of recent mechanical AVR on 2/8/2017 (goal INR 2.0-3.0). He had significant bilateral pleural effusion's and a recent INR of 5, and trouble breathing.  We consulted pulmonary who initially stated no thoracentesis as they felt it was from heart failure and to diurese with dialysis and IV diuresis. However since dialysis did not appear to touch the effusion and due to it's size, pulm opted to perform a thoracentesis for 1500ml of serosang to sang.  His h/h dropped o/n despite fluid removal and thoracentesis and concern was for rebleeding into the chest and he was hypotensive with dialysis (which necessitated albumin infusion) despite being hypertensive the day before.  He was transfused another unit of PRBC with plan to possibly transfuse with next dialysis session as he's volume overloaded as well with new onset heart failure.  His breath sounds haven't improved much, repeat CXR shows some improvement with continued RLL effusion,however the official read makes it appear that it's worsening. He has severe pleuritic pain on that R side 7/10.   His sternal chest pain at incisional site is controlled with current pain med regimen. Pulm appears to have sent fluid analysis however there are no specimen's in the lab.  As for his SVT, he was seen by EP and the RFA was done today with findings of EPS with AT noted; aberrancy with RBBB (clinical) also  noted.  Activation mapping with earliest signal near the lateral tricuspid valve - with RF tachycardia terminated and no longer inducible.. He was also dialyzed with 3 liter fluid removal today. His HR and b/p were being aggressively treated successfully with labetolol as this is what he came in on and per EP rec's, however in light of new findings on Echo of 30-35%, RAP 15, PAP 53 (on sildenafil), LAVERNE, RVH, increased density to myocardium, atrial septal aneurysm bulging to the L, cardiology was consulted and they feel he would best be served with switching to metoprolol, which we have 150 mg BID, for rate control as labetalol is not an approved HF med and that Metop would be better rate control than coreg and that we can adjust other meds for his HF/ afterload reduction with the goal of removing him off the clonidine permanently.    Discussion of PH medication sildenafil with cardiology today, recommendations to continue for now.     Interval History: Denies chest pain and shortness of breath today, no acute events overnight.     Review of Systems   Constitutional: Positive for fatigue. Negative for activity change, appetite change, chills, fever and unexpected weight change.   Respiratory: Positive for cough. Negative for shortness of breath.    Cardiovascular: Negative for chest pain, palpitations and leg swelling.   Gastrointestinal: Negative for abdominal pain, constipation, diarrhea, nausea and vomiting.   Neurological: Negative for dizziness, light-headedness and headaches.     Objective:     Vital Signs (Most Recent):  Temp: 98.9 °F (37.2 °C) (03/13/17 1657)  Pulse: 105 (03/13/17 1800)  Resp: (!) 23 (03/13/17 1800)  BP: 129/85 (03/13/17 1800)  SpO2: 98 % (03/13/17 1800) Vital Signs (24h Range):  Temp:  [98.3 °F (36.8 °C)-99.2 °F (37.3 °C)] 98.9 °F (37.2 °C)  Pulse:  [] 105  Resp:  [16-23] 23  SpO2:  [93 %-100 %] 98 %  BP: (129-164)/() 129/85     Weight: 77.2 kg (170 lb 3.1 oz)  Body mass index is  21.85 kg/(m^2).    Intake/Output Summary (Last 24 hours) at 03/13/17 1835  Last data filed at 03/13/17 1627   Gross per 24 hour   Intake             1240 ml   Output             4400 ml   Net            -3160 ml      Physical Exam   Constitutional: He is oriented to person, place, and time. He appears well-developed and well-nourished.   Neck: No JVD present.   Cardiovascular: Normal heart sounds and intact distal pulses.  Tachycardia present.    Pulmonary/Chest: No respiratory distress. He has decreased breath sounds in the right lower field and the left lower field.   Musculoskeletal: Normal range of motion. He exhibits no edema.   Neurological: He is oriented to person, place, and time.   Skin: Skin is warm and dry.   Psychiatric: He has a normal mood and affect.       Significant Labs:   BMP:   Recent Labs  Lab 03/13/17  0510   GLU 80   *   K 4.4      CO2 18*   BUN 48*   CREATININE 6.5*   CALCIUM 9.0   MG 2.0     CBC:   Recent Labs  Lab 03/12/17  0553 03/13/17  0510   WBC 7.72 8.02   HGB 8.5* 8.0*   HCT 25.2* 24.1*    233     CMP:   Recent Labs  Lab 03/12/17  0553 03/13/17  0510   * 133*   K 4.3 4.4    103   CO2 20* 18*   GLU 95 80   BUN 37* 48*   CREATININE 5.4* 6.5*   CALCIUM 8.7 9.0   ALBUMIN 2.1* 2.0*   ANIONGAP 10 12   EGFRNONAA 11.7* 9.3*     Significant Imaging: I have reviewed all pertinent imaging results/findings within the past 24 hours.    Assessment/Plan:      SVT (supraventricular tachycardia)  -SVT per EKG from OSH, in sinus tach on arrival to [facility], had ST with PAC's on tele, RFA done today, see below  - ? Anemia induced, s/p 3 units of PRBC's    -  Iron/TIBC studies indicative of ACD,   - tachycardia could be in part d/t anemia and or decompensated HF  - heart rate was controlled on 400mg tid of labetalol, cards consulted, not approved in HF, coreg would not provide adequate rate control, though he's also very hypertensive. Titrating metoprolol to 150mg  bid this am  as he's back being tachycardic again in the low 100's and continue to titrate as b/p and procedures allow.   - EF 30-35% with PHTN (lower than he's been in the past, 53mmhg down from 110) and biV failure? R/t to tachycardia/anemia or HF causing tachycardia/ cardiogenic shock.  - RFA today with findings of EPS with AT noted; aberrancy with RBBB (clinical) also noted.  Activation mapping with earliest signal near the lateral tricuspid valve - with RF tachycardia terminated and no longer inducible  -continue to monitor tele    S/P AVR (aortic valve replacement)  -restart heparin gtt for sub therapeutic INR, 6 hours after RFA  -increase warfarin to 5 mg, goal 2-3 (AVR 2/8/2017)  -recent h/o INR 5, + bleeding from nose and mouth, s/p vitamin K given    ESRD (end stage renal disease)  - dialysis today with 3 liter fluid removal   - hypotensive on Saturday requiring albumen administration, was given b/p meds prior to dialysis   -BP meds held today in setting of HD and RFA with EP happening today, will monitor BP  - nephrology following    Anemia of chronic disease  -receiving epoetin 10,000 units per nephrology   -h/h 8/24 today, will continue to monitor  -iron panel appears to be ACD  -s/p thoracentesis bloody tap, common post surgical, most likely exudative combined with transudative now that he has new onset HF  - ? Pericardiotomy syndrome    Uncontrolled hypertension  -BP improving, on amlodipine 5 mg PO daily, hydralazine 100 mg TID, and labetalol changed to metoprolol for rate control.    -clonidine 0.1 mg BID d/c'ed  - sildenafil for PH    Pleural effusion  -pulmonology initially recommended diuresis and dialysis, however post dialysis they performed a thoracentesis for 1500ml serosang/ sang tap.  - No f/u on labs or cxr post procedure, called lab no specimen's received, weekend pulm coverage not signed out, CXR pending  - IS, Cough deep breath/ heart pillow given, encouraged oob    Pulmonary HTN  -continue home  dose of sildenafil 20 mg PO TID, for now  -2D echo PaP 53, down from 110 pre-procedure, consulted cardiology, will discuss case with HTS on Monday.   - oxygen as needed    Severe protein-calorie malnutrition  - nutritional referral and dietary consult in for dietary education, needs teaching regarding renal diet and coumadin diet as well as appropriate fluid restriction of 1500 mL/day  -pre albumin 13 and albumin 2  -nepro renal supplements ordered    Adult congenital heart disease  - ASD closure at age 8  -  Nonrheumatic aortic valve stenosis s/p AVR 2/2017    * Acute on chronic combined systolic and diastolic heart failure  Chronic systolic heart failure along with long standing PulmHTN from congential AS, now with what appears to be volume overload as RAP are 15, new onset BIV failure, tolerating ACEI  - labetolol was changed to metoprolol as stated above.  - weaned off clonidine  - on sildenafil for PH, will consult HTS Monday for PH rec's moving forward  - added amlodipine 5 and continue hydralazine 100mg tid  - - makes some urine, responding to pRN 120 mg lasix, dialyzed today with 3 liter fluid removal  2d cfd echo:  1 - Upper limit of normal left ventricular enlargement.     2 - Moderately depressed left ventricular systolic function (EF 30-35%).     3 - Concentric hypertrophy.     4 - There is an increased density to the myocardium, suggesting a myocardial infiltrative process (clinical correlation required).     5 - Right atrial enlargement.     6 - Right ventricular hypertrophy.     7 - Moderately to severely depressed right ventricular systolic function .     8 - Aortic valve prosthesis, effective prosthetic valve area corrected for BSA is 1.07 cm2.     9 - Trivial to mild aortic regurgitation.     10 - Mild to moderate tricuspid regurgitation.     11 - Trivial pericardial effusion with thickened pericardium .     12 - Increased central venous pressure.     13 - Atrial septal aneurysm .     14 - Left  pleural effusion.     15 - Pulmonary hypertension. The estimated PA systolic pressure is 53 mmHg.     VTE Risk Mitigation         Ordered     warfarin (COUMADIN) tablet 5 mg  Daily     Route:  Oral        03/13/17 1422        Kika Knox NP  Department of Hospital Medicine   [facility]  [phone]

## 2017-03-13 NOTE — PROGRESS NOTES
Patient refused morning weight. Pt. states he will get on scale once he wakes up. Will pass on to oncoming nurse.

## 2017-03-13 NOTE — NURSING TRANSFER
Nursing Transfer Note      3/13/2017     Transfer To: [id number]    Transfer via stretcher    Transfer with cardiac monitoring verified with telemetry technician    Transported by ALETA Castanon Angelle, M    Medicines sent: None  Chart send with patient: Yes    Notified: Pt sister    Patient reassessed at: 3/13/613735    Upon arrival to floor: cardiac monitor applied, patient oriented to room, call bell in reach and bed in lowest position

## 2017-03-13 NOTE — PROGRESS NOTES
Progress Note  Nephrology    Admit Date: 3/9/2017   LOS: 4 days     SUBJECTIVE:     Follow-up For: ESRD    Interval follow up: NAEON. Patient had HD done Saturday with 1.5L UF. Diuretics were given yesterday with 1.2L of urine output.      OBJECTIVE:     Vital Signs (Most Recent)  Temp: 99.1 °F (37.3 °C) (03/13/17 0830)  Pulse: 106 (03/13/17 1300)  Resp: 18 (03/13/17 0745)  BP: (!) 158/107 (03/13/17 1215)  SpO2: (!) 93 % (03/13/17 0745)    Vital Signs Range (Last 24H):  Temp:  [98.5 °F (36.9 °C)-99.3 °F (37.4 °C)]   Pulse:  []   Resp:  [18-19]   BP: (127-164)/()   SpO2:  [93 %-98 %]        Physical Exam:   General: No acute distress, well groomed, alert and oriented x 3  HEENT: Normocephalic, atraumatic, EOM's intact bilaterally, external inspection of ears and nose normal, moist mucous membranes, no oral ulcerations/lesions  Neck: Supple, symmetrical, trachea midline, no thyromegaly, no JVD  Respiratory: Decreased breath sounds at bases. Mild crackles bilaterally.   Cardiovacular: Tachycardic, mechanical heart sound.Midline chest scar.   Gastrointestinal: Soft, non-tender, bowel sounds normal, no hepatosplenomegaly  Musculoskeletal: No knee or ankle joint tenderness or swelling.   Extremities: No clubbing or cyanosis of bilateral upper extremities; trace lower extremity edema bilaterally, radial pulses 2+ bilaterally, symmetric  Skin: warm and dry; no rash on exposed skin    Laboratory:  CBC:   Recent Labs  Lab 03/13/17  0510   WBC 8.02   RBC 2.68*   HGB 8.0*   HCT 24.1*      MCV 90   MCH 29.9   MCHC 33.2     CMP:   Recent Labs  Lab 03/09/17  0928  03/13/17  0510   GLU 93  < > 80   CALCIUM 8.6*  < > 9.0   ALBUMIN 2.0*  < > 2.0*   PROT 7.7  --   --      < > 133*   K 3.5  < > 4.4   CO2 29  < > 18*   CL 98  < > 103   BUN 28*  < > 48*   CREATININE 4.5*  < > 6.5*   ALKPHOS 100  --   --    ALT 23  --   --    AST 16  --   --    BILITOT 0.6  --   --    < > = values in this interval not  displayed.    Diagnostic Results:  Labs: Reviewed  X-Ray: Reviewed    ASSESSMENT/PLAN:     Active Hospital Problems    Diagnosis  POA    *Acute combined systolic and diastolic heart failure [I50.41]  Yes     Non ischemic DCM      Anemia of chronic disease [D63.8]  Yes    SVT (supraventricular tachycardia) [I47.1]  No    Pleural effusion [J90]  Yes    Severe protein-calorie malnutrition [E43]  Yes    ESRD (end stage renal disease) [N18.6]  Yes    S/P AVR (aortic valve replacement) [Z95.2]  Not Applicable    Adult congenital heart disease [Q24.9]  Not Applicable     nonrheumatic aortic valve stenosis s/p AVR 2/2017      Pulmonary HTN [I27.2]  Yes    Uncontrolled hypertension [I10]  Yes      Resolved Hospital Problems    Diagnosis Date Resolved POA    Volume depletion [E86.9] 03/11/2017 Yes     A 47 y.o. male presents with history of recent aortic valve replacement (2/8/17, 22 mm mechanical, indication: mod/severe aortic stenosis and regurgitation), tricuspid valve repair (28 mm annuloplasty), redo sternotomy, possible ASD closure as a child, ESRD on HD, pulmonary HTN, HTN who comes as a transfer from [place] for SVT-.     ESRD on IHD MWF  [facility]  Duration of outpatient dialysis session - 4 hours  Residual Renal Function - Yes  - Will provide dialysis for metabolic clearance and volume management x 4 hours  - Seen and examined today during hemodialysis; tolerating treatment well without issues. Denied headaches, chest pain, abdominal pain, or muscle cramps   -   - Target ultrafiltration 3L as tolerated by patient    - Dialysate adjusted to current labs  - Start patient on Lasix 80 mg IV BID  Aceess: RIJ perm- a- cath       Anemia of Chronic Kidney Disease   - Target Hgb: 10- 11.5  - Currently not at goal (8.0)   - Ferritin 1,201  - Blood transfusions as per primary team   - Will give epoetin 10,000 units MWF     MBD  - Continue binders as taken  outpatient      HTN   - Well control BP, will continue to monitor. Goal for BP is <130 mmHg SBP and BDP <80 mmHg      Patrick Ferrera   Nephrology fellow PGY- 5  [phone]  I have reviewed and concur with the fellow's history, physical, assessment, and plan. I have personally interviewed and examined the patient at bedside.

## 2017-03-13 NOTE — PLAN OF CARE
Problem: Patient Care Overview  Goal: Plan of Care Review  Outcome: Ongoing (interventions implemented as appropriate)  Pt. Remains free from falls/ injury/trauma. No complaints of CP, SOB, pain/discomfort. Heparin gtt stop for ablation this morning. NPO since midnight. Plan of Care reviewed with patient. VSS and NADN. Will continue to monitor.

## 2017-03-13 NOTE — ASSESSMENT & PLAN NOTE
New onset systolic heart failure along with long standing PulmHTN from congential AS, now with what appears to be volume overload as RAP are 15, new onset BIV failure, tolerating ACEI  - labetolol was changed to metoprolol as stated above.  - weaned off clonidine  - on sildenafil for PH, will consult [facility] Monday for PH rec's moving forward  - added amlodipine 5 and continue hydralazine 100mg tid  - - makes some urine, dialyzed today with 3 liter fluid removal  2d cfd echo:  1 - Upper limit of normal left ventricular enlargement.     2 - Moderately depressed left ventricular systolic function (EF 30-35%).     3 - Concentric hypertrophy.     4 - There is an increased density to the myocardium, suggesting a myocardial infiltrative process (clinical correlation required).     5 - Right atrial enlargement.     6 - Right ventricular hypertrophy.     7 - Moderately to severely depressed right ventricular systolic function .     8 - Aortic valve prosthesis, effective prosthetic valve area corrected for BSA is 1.07 cm2.     9 - Trivial to mild aortic regurgitation.     10 - Mild to moderate tricuspid regurgitation.     11 - Trivial pericardial effusion with thickened pericardium .     12 - Increased central venous pressure.     13 - Atrial septal aneurysm .     14 - Left pleural effusion.     15 - Pulmonary hypertension. The estimated PA systolic pressure is 53 mmHg.

## 2017-03-13 NOTE — TRANSFER OF CARE
"Anesthesia Transfer of Care Note    Patient: Isidro Lozano Jr White    Procedure(s) Performed: Procedure(s) (LRB):  ABLATION (N/A)    Patient location: PACU    Anesthesia Type: MAC    Transport from OR: Transported from OR on 6-10 L/min O2 by face mask with adequate spontaneous ventilation    Post pain: adequate analgesia    Post assessment: no apparent anesthetic complications    Post vital signs: stable    Level of consciousness: awake    Nausea/Vomiting: no nausea/vomiting    Complications: none          Last vitals:   Visit Vitals    /86 (BP Location: Left arm, Patient Position: Lying, BP Method: Automatic)    Pulse 108    Temp 37.2 °C (98.9 °F) (Axillary)    Resp 16    Ht 6' 2" (1.88 m)    Wt 77.2 kg (170 lb 3.1 oz)    SpO2 (!) 93%    BMI 21.85 kg/m2     "

## 2017-03-14 LAB
ALBUMIN SERPL BCP-MCNC: 2 G/DL
ANION GAP SERPL CALC-SCNC: 12 MMOL/L
BACTERIA BLD CULT: NORMAL
BASOPHILS # BLD AUTO: 0.02 K/UL
BASOPHILS NFR BLD: 0.2 %
BILIRUB UR QL STRIP: NEGATIVE
BUN SERPL-MCNC: 34 MG/DL
CALCIUM SERPL-MCNC: 8.5 MG/DL
CHLORIDE SERPL-SCNC: 102 MMOL/L
CLARITY UR REFRACT.AUTO: CLEAR
CO2 SERPL-SCNC: 21 MMOL/L
COLOR UR AUTO: YELLOW
CREAT SERPL-MCNC: 4.8 MG/DL
DIFFERENTIAL METHOD: ABNORMAL
EOSINOPHIL # BLD AUTO: 0.2 K/UL
EOSINOPHIL NFR BLD: 2.2 %
ERYTHROCYTE [DISTWIDTH] IN BLOOD BY AUTOMATED COUNT: 17.5 %
ERYTHROCYTE [SEDIMENTATION RATE] IN BLOOD BY WESTERGREN METHOD: 122 MM/HR
EST. GFR  (AFRICAN AMERICAN): 15.6 ML/MIN/1.73 M^2
EST. GFR  (NON AFRICAN AMERICAN): 13.5 ML/MIN/1.73 M^2
FACT X PPP CHRO-ACNC: 0.15 IU/ML
FACT X PPP CHRO-ACNC: 0.34 IU/ML
GLUCOSE SERPL-MCNC: 108 MG/DL
GLUCOSE UR QL STRIP: NEGATIVE
HCT VFR BLD AUTO: 25 %
HCYS SERPL-SCNC: 4.2 UMOL/L
HGB BLD-MCNC: 8.3 G/DL
HGB UR QL STRIP: NEGATIVE
INR PPP: 1.6
KETONES UR QL STRIP: NEGATIVE
LEUKOCYTE ESTERASE UR QL STRIP: NEGATIVE
LYMPHOCYTES # BLD AUTO: 1 K/UL
LYMPHOCYTES NFR BLD: 11.1 %
MAGNESIUM SERPL-MCNC: 1.8 MG/DL
MCH RBC QN AUTO: 30.2 PG
MCHC RBC AUTO-ENTMCNC: 33.2 %
MCV RBC AUTO: 91 FL
METHYLMALONATE SERPL-SCNC: 0.63 UMOL/L
MONOCYTES # BLD AUTO: 1.1 K/UL
MONOCYTES NFR BLD: 13 %
NEUTROPHILS # BLD AUTO: 6.4 K/UL
NEUTROPHILS NFR BLD: 73.4 %
NITRITE UR QL STRIP: NEGATIVE
PH UR STRIP: 7 [PH] (ref 5–8)
PHOSPHATE SERPL-MCNC: 3.5 MG/DL
PLATELET # BLD AUTO: 222 K/UL
PMV BLD AUTO: 10.4 FL
POTASSIUM SERPL-SCNC: 4.3 MMOL/L
PROT UR QL STRIP: ABNORMAL
PROTHROMBIN TIME: 15.8 SEC
RBC # BLD AUTO: 2.75 M/UL
SODIUM SERPL-SCNC: 135 MMOL/L
SP GR UR STRIP: 1.01 (ref 1–1.03)
URN SPEC COLLECT METH UR: ABNORMAL
UROBILINOGEN UR STRIP-ACNC: NEGATIVE EU/DL
WBC # BLD AUTO: 8.7 K/UL

## 2017-03-14 PROCEDURE — 25000003 PHARM REV CODE 250: Performed by: PHYSICIAN ASSISTANT

## 2017-03-14 PROCEDURE — 80069 RENAL FUNCTION PANEL: CPT

## 2017-03-14 PROCEDURE — 81001 URINALYSIS AUTO W/SCOPE: CPT

## 2017-03-14 PROCEDURE — 99233 SBSQ HOSP IP/OBS HIGH 50: CPT | Mod: ,,, | Performed by: PHYSICIAN ASSISTANT

## 2017-03-14 PROCEDURE — 85610 PROTHROMBIN TIME: CPT

## 2017-03-14 PROCEDURE — 20600001 HC STEP DOWN PRIVATE ROOM

## 2017-03-14 PROCEDURE — 36415 COLL VENOUS BLD VENIPUNCTURE: CPT

## 2017-03-14 PROCEDURE — 85520 HEPARIN ASSAY: CPT

## 2017-03-14 PROCEDURE — 99232 SBSQ HOSP IP/OBS MODERATE 35: CPT | Mod: ,,, | Performed by: INTERNAL MEDICINE

## 2017-03-14 PROCEDURE — 83735 ASSAY OF MAGNESIUM: CPT

## 2017-03-14 PROCEDURE — 63600175 PHARM REV CODE 636 W HCPCS: Performed by: PHYSICIAN ASSISTANT

## 2017-03-14 PROCEDURE — 87040 BLOOD CULTURE FOR BACTERIA: CPT

## 2017-03-14 PROCEDURE — 85651 RBC SED RATE NONAUTOMATED: CPT

## 2017-03-14 PROCEDURE — 85520 HEPARIN ASSAY: CPT | Mod: 91

## 2017-03-14 PROCEDURE — 25000003 PHARM REV CODE 250: Performed by: NURSE PRACTITIONER

## 2017-03-14 PROCEDURE — 83090 ASSAY OF HOMOCYSTEINE: CPT

## 2017-03-14 PROCEDURE — 25000003 PHARM REV CODE 250: Performed by: EMERGENCY MEDICINE

## 2017-03-14 PROCEDURE — 25000003 PHARM REV CODE 250: Performed by: INTERNAL MEDICINE

## 2017-03-14 PROCEDURE — 87086 URINE CULTURE/COLONY COUNT: CPT

## 2017-03-14 PROCEDURE — 85025 COMPLETE CBC W/AUTO DIFF WBC: CPT

## 2017-03-14 PROCEDURE — 99233 SBSQ HOSP IP/OBS HIGH 50: CPT | Mod: ,,, | Performed by: SURGERY

## 2017-03-14 RX ORDER — METOPROLOL TARTRATE 50 MG/1
150 TABLET ORAL ONCE
Status: COMPLETED | OUTPATIENT
Start: 2017-03-14 | End: 2017-03-14

## 2017-03-14 RX ORDER — ISOSORBIDE MONONITRATE 30 MG/1
30 TABLET, EXTENDED RELEASE ORAL DAILY
Status: DISCONTINUED | OUTPATIENT
Start: 2017-03-15 | End: 2017-03-14

## 2017-03-14 RX ORDER — AMLODIPINE BESYLATE 5 MG/1
5 TABLET ORAL DAILY
Status: DISCONTINUED | OUTPATIENT
Start: 2017-03-14 | End: 2017-03-16

## 2017-03-14 RX ORDER — MAGNESIUM SULFATE HEPTAHYDRATE 40 MG/ML
2 INJECTION, SOLUTION INTRAVENOUS ONCE
Status: COMPLETED | OUTPATIENT
Start: 2017-03-14 | End: 2017-03-14

## 2017-03-14 RX ORDER — SODIUM CHLORIDE 9 MG/ML
INJECTION, SOLUTION INTRAVENOUS
Status: DISCONTINUED | OUTPATIENT
Start: 2017-03-15 | End: 2017-03-19 | Stop reason: HOSPADM

## 2017-03-14 RX ORDER — METOPROLOL SUCCINATE 100 MG/1
200 TABLET, EXTENDED RELEASE ORAL DAILY
Status: DISCONTINUED | OUTPATIENT
Start: 2017-03-15 | End: 2017-03-18

## 2017-03-14 RX ORDER — SODIUM CHLORIDE 9 MG/ML
INJECTION, SOLUTION INTRAVENOUS ONCE
Status: COMPLETED | OUTPATIENT
Start: 2017-03-15 | End: 2017-03-15

## 2017-03-14 RX ADMIN — PANTOPRAZOLE SODIUM 40 MG: 40 TABLET, DELAYED RELEASE ORAL at 08:03

## 2017-03-14 RX ADMIN — SEVELAMER CARBONATE 800 MG: 800 TABLET, FILM COATED ORAL at 05:03

## 2017-03-14 RX ADMIN — SILDENAFIL 20 MG: 20 TABLET ORAL at 01:03

## 2017-03-14 RX ADMIN — AMLODIPINE BESYLATE 5 MG: 5 TABLET ORAL at 08:03

## 2017-03-14 RX ADMIN — COLCHICINE 0.3 MG: 0.6 TABLET, FILM COATED ORAL at 05:03

## 2017-03-14 RX ADMIN — WARFARIN SODIUM 5 MG: 5 TABLET ORAL at 05:03

## 2017-03-14 RX ADMIN — SEVELAMER CARBONATE 800 MG: 800 TABLET, FILM COATED ORAL at 11:03

## 2017-03-14 RX ADMIN — LISINOPRIL 20 MG: 20 TABLET ORAL at 08:03

## 2017-03-14 RX ADMIN — METOPROLOL TARTRATE 150 MG: 50 TABLET, FILM COATED ORAL at 11:03

## 2017-03-14 RX ADMIN — HEPARIN SODIUM AND DEXTROSE 27 UNITS/KG/HR: 10000; 5 INJECTION INTRAVENOUS at 10:03

## 2017-03-14 RX ADMIN — POLYETHYLENE GLYCOL 3350 17 G: 17 POWDER, FOR SOLUTION ORAL at 09:03

## 2017-03-14 RX ADMIN — HYDRALAZINE HYDROCHLORIDE 100 MG: 50 TABLET ORAL at 01:03

## 2017-03-14 RX ADMIN — OXYCODONE HYDROCHLORIDE 10 MG: 5 TABLET ORAL at 11:03

## 2017-03-14 RX ADMIN — SILDENAFIL 20 MG: 20 TABLET ORAL at 05:03

## 2017-03-14 RX ADMIN — OXYCODONE HYDROCHLORIDE 10 MG: 5 TABLET ORAL at 05:03

## 2017-03-14 RX ADMIN — HYDRALAZINE HYDROCHLORIDE 100 MG: 50 TABLET ORAL at 10:03

## 2017-03-14 RX ADMIN — SEVELAMER CARBONATE 800 MG: 800 TABLET, FILM COATED ORAL at 08:03

## 2017-03-14 RX ADMIN — Medication 3 ML: at 10:03

## 2017-03-14 RX ADMIN — METOPROLOL TARTRATE 150 MG: 50 TABLET, FILM COATED ORAL at 08:03

## 2017-03-14 RX ADMIN — MAGNESIUM SULFATE IN WATER 2 G: 40 INJECTION, SOLUTION INTRAVENOUS at 09:03

## 2017-03-14 RX ADMIN — OXYCODONE HYDROCHLORIDE 10 MG: 5 TABLET ORAL at 10:03

## 2017-03-14 RX ADMIN — HYDRALAZINE HYDROCHLORIDE 100 MG: 50 TABLET ORAL at 05:03

## 2017-03-14 RX ADMIN — Medication 3 ML: at 01:03

## 2017-03-14 RX ADMIN — HEPARIN SODIUM AND DEXTROSE 27 UNITS/KG/HR: 10000; 5 INJECTION INTRAVENOUS at 08:03

## 2017-03-14 RX ADMIN — AMLODIPINE BESYLATE 5 MG: 5 TABLET ORAL at 01:03

## 2017-03-14 NOTE — PROGRESS NOTES
Cardiology Progress Note    Subjective:     Interval History: Patient did well yesterday with EP study, found to have focal atrial tachycardia coming from lateral tricuspid s/p ablation and no further inducibility.  Also noted to have HD catheter in RA which could be inducing ectopy.    HD yesterday as well -3L.  Feels well this morning however still slightly short of breath and CXR shows persistence of R-sided pleural effusion despite thoracentesis and several days of HD.    Low grade temp to 100.1 overnight, continues to have tachycardia - sinus with PACs.    [facility] records obtained showing non-obstructive CAD pre cath.  Meds:     Scheduled Meds:   amlodipine  5 mg Oral Daily    hydrALAZINE  100 mg Oral Q8H    lisinopril  20 mg Oral Daily    [START ON 3/15/2017] metoprolol succinate  200 mg Oral Daily    pantoprazole  40 mg Oral Daily    sevelamer carbonate  800 mg Oral TID WM    sodium chloride 0.9%  3 mL Intravenous Q8H    warfarin  5 mg Oral Daily     PRN Meds:acetaminophen, heparin (porcine), heparin (PORCINE), ondansetron, ondansetron, oxycodone, polyethylene glycol, sodium chloride 0.9%, tramadol  Continuous Infusions:   heparin (porcine) in D5W 27 Units/kg/hr (03/14/17 0825)       Physical Exam:     Vitals:  Temp:  [98.1 °F (36.7 °C)-100.1 °F (37.8 °C)]   Pulse:  [100-128]   Resp:  [16-23]   BP: (128-162)/(83-96)   SpO2:  [93 %-100 %]   I/O's:    Intake/Output Summary (Last 24 hours) at 03/14/17 1404  Last data filed at 03/14/17 0600   Gross per 24 hour   Intake              820 ml   Output              300 ml   Net              520 ml        Constitutional:  NAD, conversant  HEENT:   Sclera anicteric, PERRLA, EOMI, OP clear  Neck:   JVD 2-3cm  CV:   Tachycardic, metalic heart sound heart  Pulm:   Decreased right sided breath sounds, mildly tachypneic  GI:   Abdomen mildly distended  Extremities:  No LE edema, warm with palpable pulses  Skin:   No ecchymosis, erythema, or ulcers  Neuro:    AAOX3, no focal motor deficits      Labs:     Recent Results (from the past 336 hour(s))   CBC auto differential    Collection Time: 03/14/17  5:03 AM   Result Value Ref Range    WBC 8.70 3.90 - 12.70 K/uL    Hemoglobin 8.3 (L) 14.0 - 18.0 g/dL    Hematocrit 25.0 (L) 40.0 - 54.0 %    Platelets 222 150 - 350 K/uL   CBC auto differential    Collection Time: 03/13/17  5:10 AM   Result Value Ref Range    WBC 8.02 3.90 - 12.70 K/uL    Hemoglobin 8.0 (L) 14.0 - 18.0 g/dL    Hematocrit 24.1 (L) 40.0 - 54.0 %    Platelets 233 150 - 350 K/uL   CBC auto differential    Collection Time: 03/12/17  5:53 AM   Result Value Ref Range    WBC 7.72 3.90 - 12.70 K/uL    Hemoglobin 8.5 (L) 14.0 - 18.0 g/dL    Hematocrit 25.2 (L) 40.0 - 54.0 %    Platelets 237 150 - 350 K/uL       Recent Results (from the past 336 hour(s))   Basic metabolic panel     Collection Time: 03/10/17  4:51 AM   Result Value Ref Range    Sodium 136 136 - 145 mmol/L    Potassium 3.5 3.5 - 5.1 mmol/L    Chloride 99 95 - 110 mmol/L    CO2 28 23 - 29 mmol/L    BUN, Bld 34 (H) 6 - 20 mg/dL    Creatinine 5.3 (H) 0.5 - 1.4 mg/dL    Calcium 8.5 (L) 8.7 - 10.5 mg/dL    Anion Gap 9 8 - 16 mmol/L         Recent Labs  Lab 03/09/17  0928 03/09/17  1215   TROPONINI 0.115* 0.135*       Estimated Creatinine Clearance: 20.1 mL/min (based on Cr of 4.8).    Imaging: Echo 3/10/16  1 - Upper limit of normal left ventricular enlargement.     2 - Moderately depressed left ventricular systolic function (EF 30-35%).     3 - Concentric hypertrophy.     4 - There is an increased density to the myocardium, suggesting a myocardial infiltrative process (clinical correlation required).     5 - Right atrial enlargement.     6 - Right ventricular hypertrophy.     7 - Moderately to severely depressed right ventricular systolic function .     8 - Aortic valve prosthesis, effective prosthetic valve area corrected for BSA is 1.07 cm2.     9 - Trivial to mild aortic regurgitation.     10 - Mild to  moderate tricuspid regurgitation.     11 - Trivial pericardial effusion with thickened pericardium .     12 - Increased central venous pressure.     13 - Atrial septal aneurysm .     14 - Left pleural effusion.     15 - Pulmonary hypertension. The estimated PA systolic pressure is 53 mmHg.     Telemetry:  Telemetry currently shows: Sinus tachycardia, PACs      Assessment & Plan:     47 year old male with recent aortic valve replacement (2/8/17, 22 mm mechanical, indication: mod/severe aortic stenosis and regurgitation), tricuspid valve repair (28 mm annuloplasty), redo sternotomy - possible ASD closure as a child, ESRD on HD since 1/30/2017, pulmonary HTN, HTN. The patient was seen with the following diagnoses:      1) HFrEF / ADHF / BiV Failure  - LVEF dropped to 33% from 60% compared to his pre-surgery Echo.  - [facility] with non-obstructive CAD  - LV thickened with dysfunction prior to surgery despite normal EF, drop in EF more likely exacerbation of underlying cardiomyopathy as a result of volume or tachycardia.  Repeat TTE when euvolemic - STILL WITH VOLUME ON BOARD - DO NOT REPEAT TTE yet.  - Sinus tach 2/2 CHF vs. Inflammatory reaction - cont to monitor  - Continue hydralazine, lisinopril, norvasc.  He was noted to be hypertensive still during HD yesterday thus would consider holding only one of these medications on day of HD - Hydralazine has shortest half life thus most likely to be effective.  - Would change to metoprolol succinate to 200mg PO daily for heart failure.  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  CRP elevated on admission.  ESR elevated today.  Low grade temperature.  - Would also consider drug induced lupus with hydralazine - check SALIMA, anti-dsDNA, anti-histone.  - Recheck BCx  - Would like to renally dose colchicine - will discuss with pharmacy staff.  - Stop sildenafil given     2) S/P  AVR and Tricuspid valve repair  - Surgical incision healing well.  - Anticoagulated with warfarin. Labile INR. On heparin infusion due to subtherapeutic INR.  - Prosthetic valve function normal on echo.     3) Anemia  - Normocytic.   - Likely multifactorial: Blood loss, ESRD. Iron panel does not show iron deficiency.  - Should receive EPO with HD.  - Would check homocysteine given elevated MMA    4) Elevated Troponin  - Likely due to tachycardia in the setting of recent heart surgery and ESRD.  - No anginal chest pain, no new ischemic changes in ECG.  - ACS is unlikely with these findings.    5) Wide Complex Tachycardia  - AT with RBBB s/p ablation yesterday with EP.    6) ESRD  - Consider lasix in between HD today - discuss with nephrology.  Also would like to remove permacath eventually - discuss with vascular surgery for expedited fistula placement.    7) HTN  - as above recommend holding hydralazine for permissive hypertension during HD.    Attending Addendum:

## 2017-03-14 NOTE — SUBJECTIVE & OBJECTIVE
Interval History: No acute events overnight, reports some tachycardia, improvement in shortness of breath, and denies chest pain.     Review of Systems   Constitutional: Negative for activity change, appetite change, chills, fatigue and fever.   HENT: Negative for congestion.    Respiratory: Positive for shortness of breath. Negative for cough and chest tightness.    Cardiovascular: Positive for chest pain. Negative for palpitations and leg swelling.   Gastrointestinal: Negative for abdominal pain, constipation, diarrhea, nausea and vomiting.   Neurological: Positive for weakness. Negative for dizziness, light-headedness and headaches.     Objective:     Vital Signs (Most Recent):  Temp: 98.5 °F (36.9 °C) (03/14/17 1128)  Pulse: 101 (03/14/17 1128)  Resp: 16 (03/14/17 1128)  BP: 128/87 (03/14/17 1128)  SpO2: 99 % (03/14/17 1128) Vital Signs (24h Range):  Temp:  [98.1 °F (36.7 °C)-100.1 °F (37.8 °C)] 98.5 °F (36.9 °C)  Pulse:  [100-128] 101  Resp:  [16-23] 16  SpO2:  [93 %-100 %] 99 %  BP: (128-162)/(83-96) 128/87     Weight: 74 kg (163 lb 2.3 oz)  Body mass index is 20.95 kg/(m^2).    Intake/Output Summary (Last 24 hours) at 03/14/17 1322  Last data filed at 03/14/17 0600   Gross per 24 hour   Intake              820 ml   Output              900 ml   Net              -80 ml      Physical Exam   Constitutional: He is oriented to person, place, and time. He appears cachectic. No distress.   Neck: No JVD present.   Cardiovascular: Normal heart sounds and intact distal pulses.  Tachycardia present.    No murmur heard.  Pulmonary/Chest: Effort normal and breath sounds normal. He exhibits tenderness.   Abdominal: Soft. Normal appearance and bowel sounds are normal. There is no tenderness.   Musculoskeletal: He exhibits no edema.   Neurological: He is alert and oriented to person, place, and time.   Skin: Skin is warm and dry. He is not diaphoretic.   Midline sternal incision approximated and healing well, few steri-strips  in place   Psychiatric: He has a normal mood and affect.       Significant Labs:   CBC:   Recent Labs  Lab 03/13/17  0510 03/14/17  0503   WBC 8.02 8.70   HGB 8.0* 8.3*   HCT 24.1* 25.0*    222     CMP:   Recent Labs  Lab 03/13/17  0510 03/14/17  0503   * 135*   K 4.4 4.3    102   CO2 18* 21*   GLU 80 108   BUN 48* 34*   CREATININE 6.5* 4.8*   CALCIUM 9.0 8.5*   ALBUMIN 2.0* 2.0*   ANIONGAP 12 12   EGFRNONAA 9.3* 13.5*     Lipid Panel: No results for input(s): CHOL, HDL, LDLCALC, TRIG, CHOLHDL in the last 48 hours.  POCT Glucose: No results for input(s): POCTGLUCOSE in the last 48 hours.  Prealbumin: No results for input(s): PREALBUMIN in the last 48 hours.  All pertinent labs within the past 24 hours have been reviewed.    Significant Imaging: I have reviewed all pertinent imaging results/findings within the past 24 hours.

## 2017-03-14 NOTE — ASSESSMENT & PLAN NOTE
-workup pending for infectious versus inflammatory post pericardiotomy syndrome; sed rate and CRP elevated, will send blood cultures, urine culture, and homocysteine level   -will add colchicine per cardiology recommendation if workup yields inflammatory process  -on heparin gtt, continue  -continue warfarin 5 mg today, goal 2-3 (AVR 2/8/2017), will increase dose tomorrow to 7.5 if INR remains sub therapeutic tomorrow   -recent h/o INR 5, + bleeding from nose and mouth, s/p vitamin K given at OSH

## 2017-03-14 NOTE — ASSESSMENT & PLAN NOTE
-New onset systolic heart failure along with long standing PulmHTN from congential AS, now with volume overload as RAP are 15, new onset BIV failure  -2D echo: EF 30-35% concentric hypertrophy,  increased density to the myocardium, suggesting a myocardial infiltrative process (clinical correlation required), right atrial enlargement, right ventricular hypertrophy, moderately to severely depressed right ventricular systolic function, pulmonary hypertension, estimated PA systolic pressure is 53 mmHg.   -tolerating amlodipine 5 mg daily, hydralazine 100 mg TID, lisinopril 20 mg daily, and metoprolol 150 mg BID -> tomorrow will change to metoprolol succinate 200 mg daily  -will discontinue sildenafil per cardiology recommendations, PAP have improved since AVR  -dialyzed yesterday with 3 L fluid removal, still makes some urine. Nephrology following, I&O/weight noted-improved

## 2017-03-14 NOTE — PLAN OF CARE
Problem: Patient Care Overview  Goal: Plan of Care Review  Outcome: Ongoing (interventions implemented as appropriate)  Plan of care reviewed with patient. No acute events noted at this time. Heparin infusion continued. Blood cultures x2 drawn today. Will continue to monitor patient.

## 2017-03-14 NOTE — ASSESSMENT & PLAN NOTE
-receiving epoetin 10,000 units per nephrology   -h/h remains at baseline today, will continue to monitor  -iron panel appears to be ACD

## 2017-03-14 NOTE — PROGRESS NOTES
"[facility] Hospital Medicine  Progress Note    Patient Name: Isidro White  MRN: [id number]  Patient Class: IP- Inpatient   Admission Date: 3/9/2017  Length of Stay: 5 days  Attending Physician: Penelope Davis MD  Primary Care Provider: Provider [facility] Medicine Team: [facility] MED J Katrin Calles PA-C    Subjective:     Principal Problem:Acute combined systolic and diastolic heart failure    HPI:  Mr. White is a 46 yo AAM who has PMH of AV replacement and TV repair on February 8, 2017, ASD closure at age 8, ESRD on HD M/W/F, pulmonary hypertension, and HTN. He presented to the ED from [facility] after being seen for SVT and given verapamil. On arrival to the ED he is noted to be in sinus tach around 105-110. He reports a similar episode earlier in the week 3/7/2017 where he was seen at [facility] for what he describes as a fast then slowing then fast then slowing heart rate, he was treated with coreg and sent home. This morning around 0200/0300 he was awoken by the same fast then slowing then fast again heart rate, he asked for assistance from his brother and they checked his blood pressure and heart rate which they report as being heart rate 179, and . Thus prompting his visit to the hospital today. He denies stabbing chest pain, but does however report chest tightness and shortness of breath. He denies any radiation of the midsternal chest tightness. He denies headache, cough, fever, chills, night sweats, N/V/D. He reports strict adherence to renal diet and "32 ounce" fluid restriction. He denies any caffeine intake or energy drink use. He denies anxiety in the past, but endorses stress over the last month with all his medical issues.     He also reports that on 3/4/17 he started to have bleeding from his mouth, nose, and ears. He was seen again at [facility] and given 10 mL of vitamin K for an INR level of 5, per brother. He has had no " further bleeding and denies tarry stools or blood in stools. As an ESRD patient he was dialyzed per his schedule on Wednesday 3/8/2017. He has a vas-cath in the right upper chest and denies bleeding from this site. He reports urinating 2-3 times per day yellow urine, denies hematuria.     He is being admitted to the Hospital Medicine Service for further evaluation.     Hospital Course:  Admitted to Hospital Medicine Service management, for acute SVT and acute anemia, he was transfused two units of blood, and was placed on heparin gtt for sub therapeutic INR of 1.7 in setting of recent mechanical AVR on 2/8/2017 (goal INR 2.0-3.0). He had significant bilateral pleural effusion's and a recent INR of 5, and trouble breathing.  We consulted pulmonary who initially stated no thoracentesis as they felt it was from heart failure and to diurese with dialysis and IV diuresis, Pulm appears to have sent fluid analysis however there were no specimens received in lab. Dialysis did not appear to decrese effusion size, pulmonology performed thoracentesis (3/10)  with 1500ml of serosang to sang fluid removal.  He then had a drop in h/h and there was concern was for rebleeding into the chest as he was noted to be hypotensive with dialysis (which necessitated albumin infusion) and he was transfused another unit of PRBC's. His breath sounds have improved and repeat CXR shows some improvement with continued RLL small amount of pleural effusion. He also has had improvement in shortness of breath and pleuritic pain. His sternal chest pain at incisional site is controlled with current pain med regimen.      As for his SVT, EP performed RFA yesterday with findings of EPS with AT noted; aberrancy with RBBB (clinical) also noted.  Activation mapping with earliest signal near the lateral tricuspid valve - with RF tachycardia terminated and no longer inducible.  He was also dialyzed with 3 liter fluid removal yesterday. His HR and BP are being  aggressively treated with Amlodipine 5 mg daily, hydralazine 100 mg TID, lisinopril 20 mg daily, and metoprolol 150 mg BID, and after discussion with cardiology will switch to metoprolol succinate 200 mg daily starting tomorrow. His BP has improved, heart rate remains tachycardiac in the 100-110 range. Discussion of PH medication sildenafil with cardiology yesterday, recommendations to continue for now. Blood cultures, urine culture, and homocystine level pending, sed rate and CRP elevated: workup pending for infectious vs.inflammatory post pericardiotomy syndrome. Vascular surgery has been consulted to assess for AV fistula creation, recommendations/plan pending.     Interval History: No acute events overnight, reports some tachycardia, improvement in shortness of breath, and denies chest pain. Noted to have bleeding from groin site overnight and dressings changed. No dressing saturation, hematoma, or bruising on exam today.     Review of Systems   Constitutional: Negative for activity change, appetite change, chills, fatigue and fever.   HENT: Negative for congestion.    Respiratory: Positive for shortness of breath. Negative for cough and chest tightness.    Cardiovascular: Positive for chest pain. Negative for palpitations and leg swelling.   Gastrointestinal: Negative for abdominal pain, constipation, diarrhea, nausea and vomiting.   Neurological: Positive for weakness. Negative for dizziness, light-headedness and headaches.     Objective:     Vital Signs (Most Recent):  Temp: 98.5 °F (36.9 °C) (03/14/17 1128)  Pulse: 101 (03/14/17 1128)  Resp: 16 (03/14/17 1128)  BP: 128/87 (03/14/17 1128)  SpO2: 99 % (03/14/17 1128) Vital Signs (24h Range):  Temp:  [98.1 °F (36.7 °C)-100.1 °F (37.8 °C)] 98.5 °F (36.9 °C)  Pulse:  [100-128] 101  Resp:  [16-23] 16  SpO2:  [93 %-100 %] 99 %  BP: (128-162)/(83-96) 128/87     Weight: 74 kg (163 lb 2.3 oz)  Body mass index is 20.95 kg/(m^2).    Intake/Output Summary (Last 24 hours)  at 03/14/17 1322  Last data filed at 03/14/17 0600   Gross per 24 hour   Intake              820 ml   Output              900 ml   Net              -80 ml      Physical Exam   Constitutional: He is oriented to person, place, and time. He appears cachectic. No distress.   Neck: No JVD present.   Cardiovascular: Normal heart sounds and intact distal pulses.  Tachycardia present.    No murmur heard.  Pulmonary/Chest: Effort normal and breath sounds normal. He exhibits tenderness.   Abdominal: Soft. Normal appearance and bowel sounds are normal. There is no tenderness.   Musculoskeletal: He exhibits no edema.   Neurological: He is alert and oriented to person, place, and time.   Skin: Skin is warm and dry. He is not diaphoretic.   Midline sternal incision approximated and healing well, few steri-strips in place      Significant Labs:   CBC:   Recent Labs  Lab 03/13/17  0510 03/14/17  0503   WBC 8.02 8.70   HGB 8.0* 8.3*   HCT 24.1* 25.0*    222     CMP:   Recent Labs  Lab 03/13/17  0510 03/14/17  0503   * 135*   K 4.4 4.3    102   CO2 18* 21*   GLU 80 108   BUN 48* 34*   CREATININE 6.5* 4.8*   CALCIUM 9.0 8.5*   ALBUMIN 2.0* 2.0*   ANIONGAP 12 12   EGFRNONAA 9.3* 13.5*     Lipid Panel: No results for input(s): CHOL, HDL, LDLCALC, TRIG, CHOLHDL in the last 48 hours.  POCT Glucose: No results for input(s): POCTGLUCOSE in the last 48 hours.  Prealbumin: No results for input(s): PREALBUMIN in the last 48 hours.  All pertinent labs within the past 24 hours have been reviewed.    Significant Imaging: I have reviewed all pertinent imaging results/findings within the past 24 hours.    Assessment/Plan:      * Acute combined systolic and diastolic heart failure  -New onset systolic heart failure along with long standing PulmHTN from congential AS, now with volume overload as RAP are 15, new onset BIV failure  -2D echo: EF 30-35% concentric hypertrophy,  increased density to the myocardium, suggesting a  myocardial infiltrative process (clinical correlation required), right atrial enlargement, right ventricular hypertrophy, moderately to severely depressed right ventricular systolic function, pulmonary hypertension, estimated PA systolic pressure is 53 mmHg.   -tolerating amlodipine 5 mg daily, hydralazine 100 mg TID, lisinopril 20 mg daily, and metoprolol 150 mg BID -> tomorrow will change to metoprolol succinate 200 mg daily  -will discontinue sildenafil per cardiology recommendations, PAP have improved since AVR  -dialyzed yesterday with 3 L fluid removal, still makes some urine. Nephrology following, I&O/weight noted-improved     SVT (supraventricular tachycardia)  -SVT per EKG from OSH, in sinus tach on arrival to [facility]. Had ST with PAC's on tele most of hospitalization  -RFA (3/13) with findings of EPS with AT noted; aberrancy with RBBB (clinical) also noted.  Activation mapping with earliest signal near the lateral tricuspid valve - with RF tachycardia terminated and no longer inducible  -tachycardia multifactorial: questionably anemia induced, s/p 3 units of PRBC's (Iron/TIBC studies indicative of ACD) versus irritability from permacath resting in right atrium versus new onset HF  -Tachycardia maintaining at 100-115 today, tolerating amlodipine 5 mg daily, hydralazine 100 mg TID, lisinopril 20 mg daily, and metoprolol 150 mg BID -> tomorrow will change to metoprolol succinate 200 mg daily  -continue to monitor tele.   - vascular surgery consulted for AV fistula creation for eventual removal of permacath      Uncontrolled hypertension  -BP improved, see above medication regimen  -weaned off home clonidine    Pulmonary HTN  -will discontinue home sildenafil per cardiology recommendations today  -2D echo PaP 53, down from 110 pre-procedure    ESRD (end stage renal disease)  - dialysis yesterday with 3 liter fluid removal  - Normal HD MWF  -potassium 4.3 today, mag 1.8-will replete  - nephrology following  -  Currently has R IJ Permacath and Vascular surgery consulted to discuss graft placement and eventual removal of permacath as above     Adult congenital heart disease  - ASD closure at age 8  - Nonrheumatic aortic valve stenosis s/p AVR 2/2017    S/P AVR (aortic valve replacement)  -workup pending for infectious versus inflammatory post pericardiotomy syndrome; sed rate and CRP elevated, will send blood cultures, urine culture, and homocysteine level   -will add colchicine per cardiology recommendation if workup yields inflammatory process  -Continue heparin gtts  -continue warfarin 5 mg today, goal 2-3 (AVR 2/8/2017), will increase dose tomorrow to 7.5 if INR remains sub therapeutic tomorrow   -recent h/o INR 5, + bleeding from nose and mouth, s/p vitamin K given at OSH    Pleural effusion  -pulmonology performed thoracentesis (3/10) with 1500ml serosang/ sang tap, specimen not received in lab post tap  -CXR today with small amount of pleural effusion noted on right, left lung clear  - IS/flutter, cough deep breath/ heart pillow given, encouraged oob    Severe protein-calorie malnutrition  -nutritional referral and dietary consult education, needs teaching regarding renal diet and coumadin diet as well as appropriate fluid restriction of 1500 mL/day  -pre albumin 13 and albumin 2  -nepro renal supplements ordered and encouraged patient to consume    Anemia of chronic disease  -receiving epoetin 10,000 units per nephrology   -h/h remains at baseline today, will continue to monitor  -iron panel appears to be ACD    VTE Risk Mitigation         Ordered     warfarin (COUMADIN) tablet 5 mg  Daily     Route:  Oral        03/13/17 1422          Katrin Calles PA-C  Department of Hospital Medicine   [facility]

## 2017-03-14 NOTE — PLAN OF CARE
Problem: Patient Care Overview  Goal: Plan of Care Review  Outcome: Ongoing (interventions implemented as appropriate)  Pt. Remains free from falls/ injury/trauma. No complaints of CP, SOB, pain/discomfort. Groin site covered with gauze and tegaderm. Heparin gtt restarted. Plan of Care reviewed with patient. VSS and NADN. Will continue to monitor.

## 2017-03-14 NOTE — PLAN OF CARE
03/14/17 0839   Right Care Assessment   Can the patient answer the patient profile reliably? Yes, cognitively intact   How often would a person be available to care for the patient? Whenever needed   Describe the patient's ability to walk at the present time. No restrictions   How does the patient rate their overall health at the present time? Fair   Number of comorbid conditions (as recorded on the chart) Two   During the past month, has the patient often been bothered by feeling down, depressed or hopeless? No   During the past month, has the patient often been bothered by little interest or pleasure in doing things? No   Have you felt little interest or pleasure in doing things? 3   Home, no needs  [facility]

## 2017-03-14 NOTE — ASSESSMENT & PLAN NOTE
- dialysis yesterday with 3 liter fluid removal  -potassium 4.3 today, mag 1.8-will replete  - nephrology following

## 2017-03-14 NOTE — PROGRESS NOTES
Patient seen and examined.  Noted small oozing from right groin overnight.  Reports request to see CTS while in house and have other medical issues addressed.    Bilateral groins soft, non-tender, no hematoma.    Telemetry reviewed:  Sinus tachycardia overnight.  Single episode of sustained SVT, likely AT given irregularity prior to termination; not consistent with clinical given tachycardia cycle length.    Plan:  Patient s/p lateral tricuspid annulus atrial tachycardia RFA.  Continue IV heparin with coumadin bridge given mechanical valve.  Continue GDMT.  Consider vascular surgery evaluation for AV fistula for chronic HD - given placement of temporary catheter sitting in RA, would expect recurrent ectopy/tachycardia.    Gerald Jon MD

## 2017-03-14 NOTE — ASSESSMENT & PLAN NOTE
-nutritional referral and dietary consult education, needs teaching regarding renal diet and coumadin diet as well as appropriate fluid restriction of 1500 mL/day  -pre albumin 13 and albumin 2  -nepro renal supplements ordered and encouraged patient to consume

## 2017-03-14 NOTE — CONSULTS
"Consult Note  Vascular Surgery    Consult Requested By: Cardiology   Reason for Consult: ESRD in need of HD access     SUBJECTIVE:     History of Present Illness:  Patient is a 47 y.o.  male with PMhx of HTN, Pulmonary HTN, Congenital heart disease s/p ASD closure (@ 9y/o), now s/p AVR (mechanical valve 2/8/17),  ESRD on HD (began approx 6mos ago) who presented 3/9/17 with SVT. Has been dialyzing via a right IJ permacath (placed at an OSH). Upon admission to an OSH the patient noted frequent episodes of tachycardia (up to the 180s) which are associated with chest tightness, SOB, dizziness. He underwent EKG at OSH noting wide complex tachycardia (presumed SVT with RBBB aberrancy) for which he was administered verapamil & then cardioverted. He then presented to [facility] with c/o bleeding from supratherapeutic INR (5 on arrival), persistent tachycardic episodes, labile BP and c/o of "pre-syncope". The patient was admitted to Hospital medicine and cardiology was consulted. There is concern that the patient's freq PACs & runs of SVT could be secondary to irritation from his permacath. Vascular surgery is being consulted for creation of a AV access and for removal of temporary TDC.     Right Handed    Scheduled Meds:   amlodipine  5 mg Oral Daily    hydrALAZINE  100 mg Oral Q8H    lisinopril  20 mg Oral Daily    [START ON 3/15/2017] metoprolol succinate  200 mg Oral Daily    pantoprazole  40 mg Oral Daily    sevelamer carbonate  800 mg Oral TID WM    sildenafil  20 mg Oral TID    sodium chloride 0.9%  3 mL Intravenous Q8H    warfarin  5 mg Oral Daily     Continuous Infusions:   heparin (porcine) in D5W 27 Units/kg/hr (03/14/17 1713)     PRN Meds:acetaminophen, heparin (porcine), heparin (PORCINE), ondansetron, ondansetron, oxycodone, polyethylene glycol, sodium chloride 0.9%, tramadol    Review of patient's allergies indicates:  No Known Allergies    Past Medical History:   Diagnosis Date    CKD " (chronic kidney disease) stage 3, GFR 30-59 ml/min     Hypertension     Tachycardia      Past Surgical History:   Procedure Laterality Date    CARDIAC SURGERY      aortic valve replacement     History reviewed. No pertinent family history.  Social History   Substance Use Topics    Smoking status: Never Smoker    Smokeless tobacco: None    Alcohol use No      Comment: no longer drinks since surgery        Review of Systems:  Peripheral Vascular: denies rest pain or claudicatin  Constitutional: no fever or chills  Eyes: no visual changes  Respiratory: + SOB  Cardiovascular: no chest pain or palpitations  Gastrointestinal: no nausea or vomiting  Genitourinary: ESRD on HD via R IJ TDC  Hematologic/Lymphatic: + easy bruising 2/2 anticoagulation for mechanical valve  Musculoskeletal: no arthralgias or myalgias  Neurological: no seizures or tremors    OBJECTIVE:     Vital Signs (Most Recent)  Temp: 98.5 °F (36.9 °C) (03/14/17 1128)  Pulse: 101 (03/14/17 1128)  Resp: 16 (03/14/17 1128)  BP: 128/87 (03/14/17 1128)  SpO2: 99 % (03/14/17 1128)    Vital Signs Range (Last 24H):  Temp:  [98.1 °F (36.7 °C)-100.1 °F (37.8 °C)]   Pulse:  [100-128]   Resp:  [16-23]   BP: (128-162)/()   SpO2:  [93 %-100 %]     Physical Exam:  General: well developed, well nourished, appears stated age, no distress  Head: normocephalic, atraumatic  Eyes:  PERRL, EOMI  Neck: supple, symmetrical, trachea midline and Right IJ TDC in place   Lungs:  normal respiratory effort  Heart: + tachycardia  Extremities: no cyanosis or edema, or clubbing; 2+ radial and brachial artery pulses b/l     Laboratory:  CBC:   Recent Labs  Lab 03/14/17  0503   WBC 8.70   RBC 2.75*   HGB 8.3*   HCT 25.0*        CMP:   Recent Labs  Lab 03/09/17  0928  03/14/17  0503   GLU 93  < > 108   CALCIUM 8.6*  < > 8.5*   ALBUMIN 2.0*  < > 2.0*   PROT 7.7  --   --      < > 135*   K 3.5  < > 4.3   CO2 29  < > 21*   CL 98  < > 102   BUN 28*  < > 34*   CREATININE  4.5*  < > 4.8*   ALKPHOS 100  --   --    ALT 23  --   --    AST 16  --   --    BILITOT 0.6  --   --    < > = values in this interval not displayed.  Coagulation:   Recent Labs  Lab 03/09/17  0928  03/14/17  0503   INR 1.7*  < > 1.6*   APTT 29.1  --   --    < > = values in this interval not displayed.  Cardiac Markers: No results for input(s): CKMB, TROPONINT, MYOGLOBIN in the last 168 hours.    Diagnostic Results:  CXR (3/9/17):   Findings:    The examination is compared to study of 2/16/2017.    Cardiomegaly persists.  Median sternotomy for heart valve surgery remains without significant change.  There is a dual-lumen right internal jugular catheter the tip of which extends into the right atrium.    Bilateral subpulmonic pleural effusions noted.  There is also mild central vascular congestion.  Apices are clear.  No pneumothorax.   Impression       Bilateral subpulmonic pleural effusions.  2.  Cardiomegaly.          Electronically signed by: YOBANY SANTANA MD  Date: 03/09/17  Time: 09:45          ASSESSMENT/PLAN:      47 y.o.  male with PMhx of HTN, Pulmonary HTN, Congenital heart disease s/p ASD closure (@ 7y/o), now s/p AVR (mechanical valve 2/8/17),  ESRD on HD (began approx 6mos ago) who presented 3/9/17 with SVT.  - Concern that SVT may be cause of patient's intermittent SVT   - patient would benefit Acuseal graft placement and removal of permacath  - obtain vein mapping  - plan for Acuseal graft and TDC removal on Thursday     Marisa Chauhan DO   Vascular Surgery Fellow  03/14/2017    Vascular Attending  Agree with above  Will plan for LUE AVG / Accuseal - early access graft on 3/16/17.    Check vein mapping  No IVs / phlebotomy on L arm, please    Alcides Manning MD FACS

## 2017-03-14 NOTE — ASSESSMENT & PLAN NOTE
-will discontinue home sildenafil per cardiology recommendations today  -2D echo PaP 53, down from 110 pre-procedure

## 2017-03-14 NOTE — PROGRESS NOTES
Patient right groin site oozing. Pressure applied. MD Hopper at bedside. Femoral Pop removed. Gauze and tegaderm  applied to site. Order to hold off on restarting heparin and reassess in a hour. Will continue to monitor.

## 2017-03-14 NOTE — ASSESSMENT & PLAN NOTE
-SVT per EKG from OSH, in sinus tach on arrival to [facility], had ST with PAC's on tele most of hospitalization  -RFA (3/13) with findings of EPS with AT noted; aberrancy with RBBB (clinical) also noted.  Activation mapping with earliest signal near the lateral tricuspid valve - with RF tachycardia terminated and no longer inducible  -tachycardia multifactorial: questionably anemia induced, s/p 3 units of PRBC's (Iron/TIBC studies indicative of ACD) versus irritability from permacath resting in right atrium versus new onset HF  -Tachycardia maintaining at 100-115 today, tolerating amlodipine 5 mg daily, hydralazine 100 mg TID, lisinopril 20 mg daily, and metoprolol 150 mg BID -> tomorrow will change to metoprolol succinate 200 mg daily  -continue to monitor tele, vascular surgery consulted for AV fistula creation for eventual removal of permacath

## 2017-03-14 NOTE — PROGRESS NOTES
Pt. Heart Rate increased to 160's lasting for 2 minutes then decreases to 120's. [facility] notified. Ordered to give scheduled metoprolol early. Will continue to monitor.

## 2017-03-15 ENCOUNTER — ANESTHESIA EVENT (OUTPATIENT)
Dept: SURGERY | Facility: HOSPITAL | Age: 47
DRG: 273 | End: 2017-03-15
Payer: MEDICAID

## 2017-03-15 LAB
ALBUMIN SERPL BCP-MCNC: 1.9 G/DL
ANION GAP SERPL CALC-SCNC: 12 MMOL/L
BACTERIA #/AREA URNS AUTO: NORMAL /HPF
BACTERIA UR CULT: NO GROWTH
BASOPHILS # BLD AUTO: 0.02 K/UL
BASOPHILS NFR BLD: 0.2 %
BUN SERPL-MCNC: 49 MG/DL
CALCIUM SERPL-MCNC: 8.6 MG/DL
CHLORIDE SERPL-SCNC: 99 MMOL/L
CO2 SERPL-SCNC: 22 MMOL/L
CREAT SERPL-MCNC: 5.8 MG/DL
DIFFERENTIAL METHOD: ABNORMAL
EOSINOPHIL # BLD AUTO: 0.4 K/UL
EOSINOPHIL NFR BLD: 4.4 %
ERYTHROCYTE [DISTWIDTH] IN BLOOD BY AUTOMATED COUNT: 17.2 %
EST. GFR  (AFRICAN AMERICAN): 12.4 ML/MIN/1.73 M^2
EST. GFR  (NON AFRICAN AMERICAN): 10.7 ML/MIN/1.73 M^2
FACT X PPP CHRO-ACNC: 0.23 IU/ML
FACT X PPP CHRO-ACNC: 0.29 IU/ML
FACT X PPP CHRO-ACNC: <0.1 IU/ML
GLUCOSE SERPL-MCNC: 78 MG/DL
HCT VFR BLD AUTO: 24.6 %
HGB BLD-MCNC: 8.2 G/DL
HYALINE CASTS UR QL AUTO: 0 /LPF
INR PPP: 1.5
LYMPHOCYTES # BLD AUTO: 1 K/UL
LYMPHOCYTES NFR BLD: 11.1 %
MAGNESIUM SERPL-MCNC: 2.5 MG/DL
MCH RBC QN AUTO: 30.1 PG
MCHC RBC AUTO-ENTMCNC: 33.3 %
MCV RBC AUTO: 90 FL
MICROSCOPIC COMMENT: NORMAL
MONOCYTES # BLD AUTO: 1.2 K/UL
MONOCYTES NFR BLD: 14.1 %
NEUTROPHILS # BLD AUTO: 6.1 K/UL
NEUTROPHILS NFR BLD: 70.1 %
PHOSPHATE SERPL-MCNC: 4.1 MG/DL
PLATELET # BLD AUTO: 218 K/UL
PMV BLD AUTO: 9.8 FL
POTASSIUM SERPL-SCNC: 4.4 MMOL/L
PROTHROMBIN TIME: 15.4 SEC
RBC # BLD AUTO: 2.72 M/UL
RBC #/AREA URNS AUTO: 2 /HPF (ref 0–4)
SODIUM SERPL-SCNC: 133 MMOL/L
WBC # BLD AUTO: 8.73 K/UL
WBC #/AREA URNS AUTO: 3 /HPF (ref 0–5)

## 2017-03-15 PROCEDURE — 25000003 PHARM REV CODE 250: Performed by: INTERNAL MEDICINE

## 2017-03-15 PROCEDURE — 99231 SBSQ HOSP IP/OBS SF/LOW 25: CPT | Mod: ,,, | Performed by: INTERNAL MEDICINE

## 2017-03-15 PROCEDURE — 25000003 PHARM REV CODE 250: Performed by: NURSE PRACTITIONER

## 2017-03-15 PROCEDURE — 83735 ASSAY OF MAGNESIUM: CPT

## 2017-03-15 PROCEDURE — 93005 ELECTROCARDIOGRAM TRACING: CPT

## 2017-03-15 PROCEDURE — 80100016 HC MAINTENANCE HEMODIALYSIS

## 2017-03-15 PROCEDURE — 25000003 PHARM REV CODE 250

## 2017-03-15 PROCEDURE — 36589 REMOVAL TUNNELED CV CATH: CPT | Mod: ,,, | Performed by: INTERNAL MEDICINE

## 2017-03-15 PROCEDURE — 85520 HEPARIN ASSAY: CPT | Mod: 91

## 2017-03-15 PROCEDURE — 05PYX3Z REMOVAL OF INFUSION DEVICE FROM UPPER VEIN, EXTERNAL APPROACH: ICD-10-PCS | Performed by: INTERNAL MEDICINE

## 2017-03-15 PROCEDURE — 93010 ELECTROCARDIOGRAM REPORT: CPT | Mod: ,,, | Performed by: INTERNAL MEDICINE

## 2017-03-15 PROCEDURE — 90935 HEMODIALYSIS ONE EVALUATION: CPT | Mod: ,,, | Performed by: INTERNAL MEDICINE

## 2017-03-15 PROCEDURE — 99232 SBSQ HOSP IP/OBS MODERATE 35: CPT | Mod: ,,, | Performed by: CLINICAL NURSE SPECIALIST

## 2017-03-15 PROCEDURE — 80069 RENAL FUNCTION PANEL: CPT

## 2017-03-15 PROCEDURE — 99233 SBSQ HOSP IP/OBS HIGH 50: CPT | Mod: ,,, | Performed by: PHYSICIAN ASSISTANT

## 2017-03-15 PROCEDURE — 36415 COLL VENOUS BLD VENIPUNCTURE: CPT

## 2017-03-15 PROCEDURE — 25000003 PHARM REV CODE 250: Performed by: EMERGENCY MEDICINE

## 2017-03-15 PROCEDURE — 36589 REMOVAL TUNNELED CV CATH: CPT

## 2017-03-15 PROCEDURE — 27000207 HC ISOLATION

## 2017-03-15 PROCEDURE — 63600175 PHARM REV CODE 636 W HCPCS: Performed by: NURSE PRACTITIONER

## 2017-03-15 PROCEDURE — 25000003 PHARM REV CODE 250: Performed by: PHYSICIAN ASSISTANT

## 2017-03-15 PROCEDURE — 85025 COMPLETE CBC W/AUTO DIFF WBC: CPT

## 2017-03-15 PROCEDURE — 20600001 HC STEP DOWN PRIVATE ROOM

## 2017-03-15 PROCEDURE — 85610 PROTHROMBIN TIME: CPT

## 2017-03-15 RX ORDER — FUROSEMIDE 10 MG/ML
80 INJECTION INTRAMUSCULAR; INTRAVENOUS 2 TIMES DAILY
Status: DISCONTINUED | OUTPATIENT
Start: 2017-03-15 | End: 2017-03-19 | Stop reason: HOSPADM

## 2017-03-15 RX ORDER — WARFARIN 7.5 MG/1
7.5 TABLET ORAL DAILY
Status: DISCONTINUED | OUTPATIENT
Start: 2017-03-15 | End: 2017-03-15

## 2017-03-15 RX ORDER — METOPROLOL TARTRATE 1 MG/ML
5 INJECTION, SOLUTION INTRAVENOUS ONCE
Status: DISCONTINUED | OUTPATIENT
Start: 2017-03-15 | End: 2017-03-15

## 2017-03-15 RX ORDER — AMOXICILLIN 250 MG
1 CAPSULE ORAL 2 TIMES DAILY
Status: DISCONTINUED | OUTPATIENT
Start: 2017-03-15 | End: 2017-03-19 | Stop reason: HOSPADM

## 2017-03-15 RX ORDER — GLYCERIN 1 G/1
1 SUPPOSITORY RECTAL DAILY PRN
Status: DISCONTINUED | OUTPATIENT
Start: 2017-03-15 | End: 2017-03-19 | Stop reason: HOSPADM

## 2017-03-15 RX ORDER — WARFARIN 7.5 MG/1
7.5 TABLET ORAL DAILY
Status: DISCONTINUED | OUTPATIENT
Start: 2017-03-16 | End: 2017-03-19 | Stop reason: HOSPADM

## 2017-03-15 RX ORDER — AMOXICILLIN 250 MG
1 CAPSULE ORAL 2 TIMES DAILY
Status: DISCONTINUED | OUTPATIENT
Start: 2017-03-15 | End: 2017-03-15

## 2017-03-15 RX ADMIN — HEPARIN SODIUM AND DEXTROSE 31 UNITS/KG/HR: 10000; 5 INJECTION INTRAVENOUS at 02:03

## 2017-03-15 RX ADMIN — OXYCODONE HYDROCHLORIDE 10 MG: 5 TABLET ORAL at 05:03

## 2017-03-15 RX ADMIN — HYDRALAZINE HYDROCHLORIDE 100 MG: 50 TABLET ORAL at 02:03

## 2017-03-15 RX ADMIN — Medication 3 ML: at 10:03

## 2017-03-15 RX ADMIN — HEPARIN SODIUM AND DEXTROSE 29 UNITS/KG/HR: 10000; 5 INJECTION INTRAVENOUS at 07:03

## 2017-03-15 RX ADMIN — PANTOPRAZOLE SODIUM 40 MG: 40 TABLET, DELAYED RELEASE ORAL at 11:03

## 2017-03-15 RX ADMIN — FUROSEMIDE 80 MG: 10 INJECTION, SOLUTION INTRAMUSCULAR; INTRAVENOUS at 05:03

## 2017-03-15 RX ADMIN — POLYETHYLENE GLYCOL 3350 17 G: 17 POWDER, FOR SOLUTION ORAL at 08:03

## 2017-03-15 RX ADMIN — SODIUM CHLORIDE: 0.9 INJECTION, SOLUTION INTRAVENOUS at 08:03

## 2017-03-15 RX ADMIN — OXYCODONE HYDROCHLORIDE 10 MG: 5 TABLET ORAL at 10:03

## 2017-03-15 RX ADMIN — HYDRALAZINE HYDROCHLORIDE 100 MG: 50 TABLET ORAL at 10:03

## 2017-03-15 RX ADMIN — AMLODIPINE BESYLATE 5 MG: 5 TABLET ORAL at 11:03

## 2017-03-15 RX ADMIN — HEPARIN SODIUM 1000 UNITS: 1000 INJECTION, SOLUTION INTRAVENOUS; SUBCUTANEOUS at 09:03

## 2017-03-15 RX ADMIN — METOPROLOL SUCCINATE 200 MG: 100 TABLET, FILM COATED, EXTENDED RELEASE ORAL at 11:03

## 2017-03-15 RX ADMIN — Medication 3 ML: at 05:03

## 2017-03-15 RX ADMIN — STANDARDIZED SENNA CONCENTRATE AND DOCUSATE SODIUM 1 TABLET: 8.6; 5 TABLET, FILM COATED ORAL at 08:03

## 2017-03-15 RX ADMIN — LISINOPRIL 20 MG: 20 TABLET ORAL at 11:03

## 2017-03-15 RX ADMIN — SEVELAMER CARBONATE 800 MG: 800 TABLET, FILM COATED ORAL at 07:03

## 2017-03-15 RX ADMIN — HYDRALAZINE HYDROCHLORIDE 100 MG: 50 TABLET ORAL at 05:03

## 2017-03-15 RX ADMIN — HEPARIN SODIUM AND DEXTROSE 31 UNITS/KG/HR: 10000; 5 INJECTION INTRAVENOUS at 10:03

## 2017-03-15 NOTE — PLAN OF CARE
Problem: Patient Care Overview  Goal: Plan of Care Review  Outcome: Ongoing (interventions implemented as appropriate)  Pt denies Chest pain, SOB or nausea. Fall risk reviewed with pt. No falls, trauma or injury noted. VSS. Dialysis pt; plan to have HD today.Heparin infusing. Plan of care reviewed with patient. No further questions at this time. No significant events. Will continue to monitor.

## 2017-03-15 NOTE — PROGRESS NOTES
Dialysis stopped.  Pt In SVT with RVR.    Blood returned.    Right subclavian  CVC flushed * 2 with saline and heparin       Locked with cap and tape.  No s/s infection at cvc site.   Pt tolerated hemodialysis without diff.  Pt ran  1  Hr and 20 minutes  on hemodialysis machine.   Took off  850 Ml net volume.      Used F-160 dialyzer.

## 2017-03-15 NOTE — SUBJECTIVE & OBJECTIVE
Interval History: No events overnight, however had episode of SVT in HD today, resolved spontaneously. He reports some shortness of breath with that but that it has resolved as well. He is sitting on the side of the bed this morning, only complaint is constipation.     Review of Systems   Constitutional: Positive for fatigue. Negative for appetite change, chills and fever.   Respiratory: Positive for shortness of breath (reports during elevated HR while on HD). Negative for chest tightness.    Cardiovascular: Negative for chest pain and leg swelling.   Gastrointestinal: Positive for constipation. Negative for abdominal pain, nausea and vomiting.   Neurological: Negative for dizziness, light-headedness and headaches.     Objective:     Vital Signs (Most Recent):  Temp: 98.7 °F (37.1 °C) (03/15/17 1200)  Pulse: (!) 122 (03/15/17 1300)  Resp: 19 (03/15/17 1200)  BP: 127/85 (03/15/17 1200)  SpO2: 97 % (03/15/17 1200) Vital Signs (24h Range):  Temp:  [98.5 °F (36.9 °C)-99.1 °F (37.3 °C)] 98.7 °F (37.1 °C)  Pulse:  [] 122  Resp:  [18-19] 19  SpO2:  [96 %-99 %] 97 %  BP: (121-165)/() 127/85     Weight: 74 kg (163 lb 2.3 oz)  Body mass index is 20.95 kg/(m^2).    Intake/Output Summary (Last 24 hours) at 03/15/17 1336  Last data filed at 03/15/17 0920   Gross per 24 hour   Intake              680 ml   Output             1700 ml   Net            -1020 ml      Physical Exam   Constitutional: He is oriented to person, place, and time. He appears well-developed and well-nourished. No distress.   Neck: No JVD present.   Cardiovascular: Regular rhythm, normal heart sounds and intact distal pulses.  Tachycardia present.    No murmur heard.  Pulmonary/Chest: Effort normal. He has decreased breath sounds in the right middle field and the right lower field.   Musculoskeletal: He exhibits no edema.   Neurological: He is alert and oriented to person, place, and time.   Skin: Skin is warm and dry.       Significant Labs:    BMP:   Recent Labs  Lab 03/15/17  0429   GLU 78   *   K 4.4   CL 99   CO2 22*   BUN 49*   CREATININE 5.8*   CALCIUM 8.6*   MG 2.5     CBC:   Recent Labs  Lab 03/14/17  0503 03/15/17  0429   WBC 8.70 8.73   HGB 8.3* 8.2*   HCT 25.0* 24.6*    218     CMP:   Recent Labs  Lab 03/14/17  0503 03/15/17  0429   * 133*   K 4.3 4.4    99   CO2 21* 22*    78   BUN 34* 49*   CREATININE 4.8* 5.8*   CALCIUM 8.5* 8.6*   ALBUMIN 2.0* 1.9*   ANIONGAP 12 12   EGFRNONAA 13.5* 10.7*     All pertinent labs within the past 24 hours have been reviewed.    Significant Imaging: I have reviewed all pertinent imaging results/findings within the past 24 hours.

## 2017-03-15 NOTE — ASSESSMENT & PLAN NOTE
- dialysis 3/14 with 3 liter fluid removal and today 3/15 with 850 mL removal, early termination of HD s/t SVT  -potassium 4.4 today, mag 2.5  -lasix 80 mg IVP BID-per nephrology recommendations  - nephrology following

## 2017-03-15 NOTE — ANESTHESIA PREPROCEDURE EVALUATION
03/15/2017  Isidro White is a 47 y.o., male c PmHx of AV replacement and TV repair on February 8, 2017, ASD closure at age 8, ESRD on HD M/W/F, pulmonary hypertension, NICM, and HTN.  He was initially seen at [facility] for SVT and given verapamil.  He then had recurrence and additionally bleeding from nose mouth and ears; INR was found to be 5 at OSH. Given vit K, but was 1.7 at [facility].  Upon admission he was anemic to Hct 20.9 and was in SVT. Pt is now  S/p 2U PRBC, ablation 3/13/17, thoracentesis 3/10/17 that removed 1500cc of serosanguinous fluid. Patient's h/h dropped again and received 1 U PRBC and patient still has runs of SVT during dialysis. 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 Patient is being evaluated for the procedure below so the tunneled catheter placed at OSH which is currently being used for dialysis can be removed.     Pre-operative evaluation for Procedure(s): Dgooanbqo-nalnn-Joqqubtuvovif Accuseal (Left arm)     IV Access:  PIV RFA 20g  HD catheter R SC    Oxygen/Ventilatory Requirements:  RA    Infusions: heparin 31u/kg/h        Last Airway:    Placement Date: 02/08/17; Method of Intubation: Direct laryngoscopy; Airway Device: Endotracheal Tube; Airway Device Size: 9.0; Style: Cuffed; Cuff Inflation: Minimal occlusive pressure;  Depth of Insertion: 24; Securment: Lips; Breath Sounds: Equal Bilateral; Removal Date: 02/10/17;  Removal Time: 1330; Removal Indication & Assessment: removed per order; Name of Person who Removed: kaz be    Patient Active Problem List   Diagnosis    Uncontrolled hypertension    Hypertensive cardiomegaly with heart failure    CKD stage 5 secondary to hypertension    Pulmonary HTN    Nonrheumatic aortic valve stenosis    Adult congenital heart disease    Hyperglycemia    S/P AVR (aortic valve  replacement)    Postprocedural hypotension    ESRD (end stage renal disease)    SVT (supraventricular tachycardia)    Pleural effusion    Severe protein-calorie malnutrition    Anemia of chronic disease    Acute combined systolic and diastolic heart failure    Nonischemic cardiomyopathy       Review of patient's allergies indicates:  No Known Allergies    Current Facility-Administered Medications on File Prior to Visit   Medication Dose Route Frequency Provider Last Rate Last Dose    0.9%  NaCl infusion   Intravenous PRN Patrick Mary MD        acetaminophen tablet 650 mg  650 mg Oral Q8H PRN Gavin Carroll MD        amlodipine tablet 5 mg  5 mg Oral Daily Stacy Flynn NP   5 mg at 03/15/17 1137    [START ON 3/21/2017] colchicine split tablet 0.3 mg  0.3 mg Oral Q7 Days Stacy Flynn NP        furosemide injection 80 mg  80 mg Intravenous BID Stacy Flynn NP        glycerin adult suppository 1 suppository  1 suppository Rectal Daily PRN Stacy Flynn NP        heparin (porcine) injection 1,000 Units  1,000 Units Intra-Catheter PRN Patrick Mary MD   1,000 Units at 03/15/17 0920    heparin 25,000 units in dextrose 5% 250 mL (100 units/mL) bolus from bag; ADDITIONAL PRN BOLUS  30 Units/kg Intravenous PRN Brett Ventura MD   1,122 Units at 03/15/17 0711    heparin 25,000 units in dextrose 5% 250 mL (100 units/mL) infusion  23 Units/kg/hr Intravenous Continuous Gerald Jon MD 21.7 mL/hr at 03/15/17 0722 29 Units/kg/hr at 03/15/17 0722    hydrALAZINE tablet 100 mg  100 mg Oral Q8H Gavin Carroll MD   100 mg at 03/15/17 0514    lisinopril tablet 20 mg  20 mg Oral Daily Kika Knox NP   20 mg at 03/15/17 1137    metoprolol succinate (TOPROL-XL) 24 hr tablet 200 mg  200 mg Oral Daily Katrin Calles PA-C   200 mg at 03/15/17 1137    ondansetron disintegrating tablet 8 mg  8 mg Oral Q8H PRN Stacy Flynn NP         ondansetron injection 4 mg  4 mg Intravenous Q6H PRN Gavin Carroll MD        oxycodone immediate release tablet 10 mg  10 mg Oral Q4H PRN Kika Knox NP   10 mg at 03/15/17 0517    pantoprazole EC tablet 40 mg  40 mg Oral Daily Gavin Carroll MD   40 mg at 03/15/17 1137    polyethylene glycol packet 17 g  17 g Oral Daily PRN Gavin Carroll MD   17 g at 03/14/17 0908    senna-docusate 8.6-50 mg per tablet 1 tablet  1 tablet Oral BID Stacy Flynn NP        sevelamer carbonate tablet 800 mg  800 mg Oral TID WM Gavin Carroll MD   800 mg at 03/14/17 1713    sodium chloride 0.9% flush 3 mL  3 mL Intravenous Q8H Gavin Carroll MD   3 mL at 03/15/17 0515    sodium chloride 0.9% flush 3 mL  3 mL Intravenous PRN Kvng Rojas MD        tramadol tablet 100 mg  100 mg Oral Q6H PRN Kika Knox NP        [START ON 3/16/2017] warfarin (COUMADIN) tablet 7.5 mg  7.5 mg Oral Daily Stacy Flynn NP         Current Outpatient Prescriptions on File Prior to Visit   Medication Sig Dispense Refill    ascorbic acid, vitamin C, (VITAMIN C) 500 MG tablet Take 1 tablet (500 mg total) by mouth 2 (two) times daily.      aspirin (ECOTRIN) 81 MG EC tablet Take 1 tablet (81 mg total) by mouth once daily.  0    cloNIDine (CATAPRES) 0.1 MG tablet Take 1 tablet (0.1 mg total) by mouth 2 (two) times daily. 60 tablet 11    docusate sodium (COLACE) 100 MG capsule Take 1 capsule (100 mg total) by mouth 2 (two) times daily.  0    hydrALAZINE (APRESOLINE) 100 MG tablet TAKE ONE TABLET BY MOUTH THREE TIMES DAILY FOR  HYPERTENSION 90 tablet 1    labetalol (NORMODYNE) 300 MG tablet Take 1 tablet (300 mg total) by mouth every 12 (twelve) hours. 60 tablet 1    omega-3 fatty acids-vitamin E (FISH OIL) 1,000 mg Cap Take 1 capsule by mouth once daily.  0    oxycodone (ROXICODONE) 5 MG immediate release tablet Take 1 tablet (5 mg total) by mouth every 4 (four) hours as needed. 60 tablet 0     sevelamer carbonate (RENVELA) 800 mg Tab Take 1 tablet (800 mg total) by mouth 3 (three) times daily with meals. 90 tablet 11    sildenafil (REVATIO) 20 mg Tab Take 1 tablet (20 mg total) by mouth 3 (three) times daily. 90 tablet 11    warfarin (COUMADIN) 7.5 MG tablet Take 1 tablet (7.5 mg total) by mouth Daily. 30 tablet 11       Past Surgical History:   Procedure Laterality Date    CARDIAC SURGERY      aortic valve replacement       Social History     Social History    Marital status: Single     Spouse name: N/A    Number of children: N/A    Years of education: N/A     Occupational History    Not on file.     Social History Main Topics    Smoking status: Never Smoker    Smokeless tobacco: Not on file    Alcohol use No      Comment: no longer drinks since surgery    Drug use: No    Sexual activity: Not on file     Other Topics Concern    Not on file     Social History Narrative         Vital Signs Range (Last 24H):  Temp:  [36.9 °C (98.5 °F)-37.3 °C (99.1 °F)]   Pulse:  []   Resp:  [18-19]   BP: (121-165)/()   SpO2:  [96 %-99 %]       CBC:   Recent Labs      03/14/17   0503  03/15/17   0429   WBC  8.70  8.73   RBC  2.75*  2.72*   HGB  8.3*  8.2*   HCT  25.0*  24.6*   PLT  222  218   MCV  91  90   MCH  30.2  30.1   MCHC  33.2  33.3       CMP:   Recent Labs      03/14/17   0503  03/15/17   0429   NA  135*  133*   K  4.3  4.4   CL  102  99   CO2  21*  22*   BUN  34*  49*   CREATININE  4.8*  5.8*   GLU  108  78   MG  1.8  2.5   PHOS  3.5  4.1   CALCIUM  8.5*  8.6*   ALBUMIN  2.0*  1.9*       INR  Recent Labs      03/13/17   0510  03/14/17   0503  03/15/17   0429   INR  1.7*  1.6*  1.5*           Diagnostic Studies:      EKG:    Sinus tachycardia with Premature supraventricular complexes  Left anterior fascicular block  LVH with QRS widening and repolarization abnormality  Abnormal ECG  When compared with ECG of 13-MAR-2017 17:12,  No significant change was found    2D Echo:  CONCLUSIONS     1  - Upper limit of normal left ventricular enlargement.     2 - Moderately depressed left ventricular systolic function (EF 30-35%).     3 - Concentric hypertrophy.     4 - There is an increased density to the myocardium, suggesting a myocardial infiltrative process (clinical correlation required).     5 - Right atrial enlargement.     6 - Right ventricular hypertrophy.     7 - Moderately to severely depressed right ventricular systolic function .     8 - Aortic valve prosthesis, effective prosthetic valve area corrected for BSA is 1.07 cm2.     9 - Trivial to mild aortic regurgitation.     10 - Mild to moderate tricuspid regurgitation.     11 - Trivial pericardial effusion with thickened pericardium .     12 - Increased central venous pressure.     13 - Atrial septal aneurysm .     14 - Left pleural effusion.     15 - Pulmonary hypertension. The estimated PA systolic pressure is 53 mmHg.             This document has been electronically    SIGNED BY: Wilmer Mccrod MD On: 03/10/2017 16:38    [facility] Anesthesia Evaluation    I have reviewed the Patient Summary Reports.    I have reviewed the Nursing Notes.   I have reviewed the Medications.     Review of Systems  Anesthesia Hx:  No problems with previous Anesthesia  History of prior surgery of interest to airway management or planning: heart surgery. Previous anesthesia: General Denies Family Hx of Anesthesia complications.   Denies Personal Hx of Anesthesia complications.   Social:  Former Smoker    Hematology/Oncology:  Hematology Normal   Oncology Normal     EENT/Dental:EENT/Dental Normal   Cardiovascular:   Exercise tolerance: poor Hypertension, poorly controlled Valvular problems/Murmurs (s/p replacement), AS CHF ECG has been reviewed. pulm HTN    Pulmonary:   Denies COPD.  Denies Asthma.    Renal/:   Chronic Renal Disease, CRI, Dialysis    Hepatic/GI:   Hepatitis, B    Neurological:  Neurology Normal    Endocrine:   Denies Diabetes.        Physical  Exam  General:  Malnutrition    Airway/Jaw/Neck:  Airway Findings: Mouth Opening: Normal Tongue: Normal  General Airway Assessment: Adult  Mallampati: II  Improves to I with phonation.  TM Distance: Normal, at least 6 cm  Jaw/Neck Findings:  Neck ROM: Normal ROM     Eyes/Ears/Nose:  EYES/EARS/NOSE FINDINGS: Normal   Dental:  Dental Findings:    Chest/Lungs:  Chest/Lungs Findings: Normal Respiratory Rate  Decreased breath sounds to right lower lobe.      Heart/Vascular:  Heart Findings: Rate: Tachycardia  Rhythm: Regular Rhythm  Sounds: Normal     Abdomen:  Abdomen Findings:  Distention       Mental Status:  Mental Status Findings:  Cooperative, Alert and Oriented         Anesthesia Plan  Type of Anesthesia, risks & benefits discussed:  Anesthesia Type:  general, regional, MAC  Patient's Preference:   Intra-op Monitoring Plan: standard ASA monitors  Intra-op Monitoring Plan Comments:   Post Op Pain Control Plan:   Post Op Pain Control Plan Comments:   Induction:   IV  Beta Blocker:  Patient is on a Beta-Blocker and has received one dose within the past 24 hours (No further documentation required).       Informed Consent: Patient understands risks and agrees with Anesthesia plan.  Questions answered. Anesthesia consent signed with patient.  ASA Score: 3     Day of Surgery Review of History & Physical:  There are no significant changes.  H&P update referred to the surgeon.         Ready For Surgery From Anesthesia Perspective.

## 2017-03-15 NOTE — PROGRESS NOTES
Cardiology Progress Note    Subjective:     Interval History: Continues to remain tachycardic to 100s-120s overnight, blood pressures within normal limits.  Satting well on room air.  Colchicine given x 1 yesterday - will dose weekly at this point.  No further fever but U/A,UCx repeated and no growth to date.     Nephrology planning on HD again today - made 450mL UOP yesterday without lasix.  Nephrology also giving EPO - hgb stable today at 8.    Vascular surgery evaluated and planning on changing access on Thursday    CXR yesterday also with persistent right sided pleural effusion.  Meds:     Scheduled Meds:   amlodipine  5 mg Oral Daily    hydrALAZINE  100 mg Oral Q8H    lisinopril  20 mg Oral Daily    metoprolol  5 mg Intravenous Once    metoprolol succinate  200 mg Oral Daily    pantoprazole  40 mg Oral Daily    sevelamer carbonate  800 mg Oral TID WM    sodium chloride 0.9%  3 mL Intravenous Q8H    warfarin  7.5 mg Oral Daily     PRN Meds:sodium chloride 0.9%, acetaminophen, heparin (porcine), heparin (PORCINE), ondansetron, ondansetron, oxycodone, polyethylene glycol, sodium chloride 0.9%, tramadol  Continuous Infusions:   heparin (porcine) in D5W 29 Units/kg/hr (03/15/17 0722)       Physical Exam:     Vitals:  Temp:  [98.5 °F (36.9 °C)-99.1 °F (37.3 °C)]   Pulse:  []   Resp:  [18]   BP: (121-165)/()   SpO2:  [96 %-99 %]   I/O's:    Intake/Output Summary (Last 24 hours) at 03/15/17 1150  Last data filed at 03/15/17 0920   Gross per 24 hour   Intake              800 ml   Output             1700 ml   Net             -900 ml        Constitutional:  NAD, conversant  HEENT:   Sclera anicteric, PERRLA, EOMI, OP clear  Neck:   JVD 2-3cm  CV:   Tachycardic, metalic heart sound heart  Pulm:   Decreased right sided breath sounds, mildly tachypneic  GI:   Abdomen mildly distended  Extremities:  No LE edema, warm with palpable pulses  Skin:   No ecchymosis, erythema, or ulcers  Neuro:   AAOX3,  no focal motor deficits    Labs:     Recent Results (from the past 336 hour(s))   CBC auto differential    Collection Time: 03/15/17  4:29 AM   Result Value Ref Range    WBC 8.73 3.90 - 12.70 K/uL    Hemoglobin 8.2 (L) 14.0 - 18.0 g/dL    Hematocrit 24.6 (L) 40.0 - 54.0 %    Platelets 218 150 - 350 K/uL   CBC auto differential    Collection Time: 03/14/17  5:03 AM   Result Value Ref Range    WBC 8.70 3.90 - 12.70 K/uL    Hemoglobin 8.3 (L) 14.0 - 18.0 g/dL    Hematocrit 25.0 (L) 40.0 - 54.0 %    Platelets 222 150 - 350 K/uL   CBC auto differential    Collection Time: 03/13/17  5:10 AM   Result Value Ref Range    WBC 8.02 3.90 - 12.70 K/uL    Hemoglobin 8.0 (L) 14.0 - 18.0 g/dL    Hematocrit 24.1 (L) 40.0 - 54.0 %    Platelets 233 150 - 350 K/uL       Recent Results (from the past 336 hour(s))   Basic metabolic panel     Collection Time: 03/10/17  4:51 AM   Result Value Ref Range    Sodium 136 136 - 145 mmol/L    Potassium 3.5 3.5 - 5.1 mmol/L    Chloride 99 95 - 110 mmol/L    CO2 28 23 - 29 mmol/L    BUN, Bld 34 (H) 6 - 20 mg/dL    Creatinine 5.3 (H) 0.5 - 1.4 mg/dL    Calcium 8.5 (L) 8.7 - 10.5 mg/dL    Anion Gap 9 8 - 16 mmol/L       Recent Labs  Lab 03/09/17  0928 03/09/17  1215   TROPONINI 0.115* 0.135*     Estimated Creatinine Clearance: 16.7 mL/min (based on Cr of 5.8).    Imaging: Echo 3/10/16  1 - Upper limit of normal left ventricular enlargement.     2 - Moderately depressed left ventricular systolic function (EF 30-35%).     3 - Concentric hypertrophy.     4 - There is an increased density to the myocardium, suggesting a myocardial infiltrative process (clinical correlation required).     5 - Right atrial enlargement.     6 - Right ventricular hypertrophy.     7 - Moderately to severely depressed right ventricular systolic function .     8 - Aortic valve prosthesis, effective prosthetic valve area corrected for BSA is 1.07 cm2.     9 - Trivial to mild aortic regurgitation.     10 - Mild to moderate  tricuspid regurgitation.     11 - Trivial pericardial effusion with thickened pericardium .     12 - Increased central venous pressure.     13 - Atrial septal aneurysm .     14 - Left pleural effusion.     15 - Pulmonary hypertension. The estimated PA systolic pressure is 53 mmHg.     Telemetry:  Telemetry currently shows: Sinus tachycardia, PACs    Assessment & Plan:     47 year old male with recent aortic valve replacement (2/8/17, 22 mm mechanical, indication: mod/severe aortic stenosis and regurgitation), tricuspid valve repair (28 mm annuloplasty), redo sternotomy - possible ASD closure as a child, ESRD on HD since 1/30/2017, pulmonary HTN, HTN. The patient was seen with the following diagnoses:      1) HFrEF / ADHF / BiV Failure  - LVEF dropped to 33% from 60% compared to his pre-surgery Echo.  OSH C with non-obstructive CAD - Recheck Echo prior to discharge.  - Sinus tach 2/2 CHF vs. Inflammatory reaction - cont to monitor  - Continue hydralazine, lisinopril, norvasc.  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  CRP elevated on admission.  ESR elevated today.  Low grade temperature.  - Would also consider drug induced lupus with hydralazine - check SALIMA, anti-dsDNA, anti-histone.  - F/u BCx from 3/14  - Cont colchicine 0.3 qwk    2) S/P AVR and Tricuspid valve repair  - Surgical incision healing well.  - Anticoagulated with warfarin. Labile INR. On heparin infusion due to subtherapeutic INR.  - Discussed with vascular surgery for their desired anti-coagulation plan prior to surgery he had mechanical aortic valve and no history of AF, without VTE history, CHADS of 2 - thus bridging is not clearly indicated - would likely be ok without bridging.    3) Anemia  - Normocytic.   - Likely multifactorial:  Blood loss, ESRD. Iron panel does not show iron deficiency.  - Should receive EPO with HD.  - homocysteine normla    4) Elevated Troponin  - Likely due to tachycardia in the setting of recent heart surgery and ESRD.  - No anginal chest pain, no new ischemic changes in ECG.  - ACS is unlikely with these findings.    5) Wide Complex Tachycardia  - AT with RBBB s/p ablation  with EP.    6) ESRD  - Vascular surgery to address access issues    7) HTN  - as above recommend holding hydralazine for permissive hypertension during HD.    Attending Addendum:

## 2017-03-15 NOTE — PROCEDURES
DATE OF PROCEDURE: 3/  PROCEDURE TYPE: Removal of right internal jugular tunnel catheter.   INDICATION: Atrial  Ectopic rthymn  PROCEDURE NOTE: After informed consent was obtained, the area of Mr White.   catheter was sterilely prepped and draped in usual fashion. Anesthesia was   obtained with 2% lidocaine with epinephrine, and the subcutaneous cuff was blunt   dissected free. The catheter was then removed in total, and a compression   dressing was applied to the exit site. He tolerated the procedure well.   There were no immediate complications. Estimated blood loss was less than 1   mL. No sedation was given.

## 2017-03-15 NOTE — ASSESSMENT & PLAN NOTE
-receiving epoetin 10,000 units MWF  -h/h remains at baseline today, will continue to monitor  -iron panel appears to be ACD

## 2017-03-15 NOTE — ASSESSMENT & PLAN NOTE
-New onset systolic heart failure along with long standing PulmHTN from congential AS, now with volume overload as RAP are 15, new onset BIV failure  -2D echo: EF 30-35% concentric hypertrophy,  increased density to the myocardium, suggesting a myocardial infiltrative process (clinical correlation required), right atrial enlargement, right ventricular hypertrophy, moderately to severely depressed right ventricular systolic function, pulmonary hypertension, estimated PA systolic pressure is 53 mmHg.   -tolerating amlodipine 5 mg daily, hydralazine 100 mg TID, lisinopril 20 mg daily, and metoprolol succinate 200 mg daily  -sildenafil discontinued, PAP's have improved since AVR  -dialyzed 3/14 with 3 L fluid removal, dialyzed with 850 mL removal today--treatment discontinued early due to SVT epidsode  -still makes some urine, Nephrology following, I&O/weight noted

## 2017-03-15 NOTE — PROGRESS NOTES
CORE Call for 's. Placed on Oxygen. STAT EKG done. F Winterbottom here, Dr IVANIA Ferrera here. Pt converted back to HR in 120's.

## 2017-03-15 NOTE — SIGNIFICANT EVENT
Critical Care Outreach (CORE)  Critical Care Medicine  Consult Note      CORE Metrics:     Admit Date: 3/9/2017  LOS: 6  Code Status: Full Code   CC: SVT  Date of Consult: 03/15/2017  : 1970  Age: 47 y.o.  Weight:   Wt Readings from Last 1 Encounters:   17 74 kg (163 lb 2.3 oz)     Race: AA  Sex: male  Bed: 345/345 A:   MRN: [id number]  RRT Indication(s): Tachycardia  Time CORE Call Received: 920  Time CORE at Bedside: 925  Was the patient discharged from an ICU this admission?  N  Was the patient discharged from a PACU within last 24 hours? N  Did the patient receive conscious sedation/general anesthesia within last 24 hours? N  Was the patient in the ED within the past 24 hours? N  Was the patient started on NIPPV within the past 24 hours? N  Did this progress into an ARC or CPA:N  Attending Physician: Penelope Davis MD  Primary Service: [facility] MED J  Illness Category:Medical  Consult Requested By: Penelope Davis MD   Disposition: Stabilized    SUBJECTIVE:     History of Present Illness: Mr. White is a 46 yo AAM who has PMH of AV replacement and TV repair on 2017, ASD closure at age 8, ESRD on HD M/W/F, pulmonary hypertension, and HTN. He presented to the ED from [facility] after being seen for SVT and given verapamil. On arrival to the ED he is noted to be in sinus tach around 105-110. He reports a similar episode earlier in the week 3/7/2017 where he was seen at [facility] for what he describes as a fast then slowing then fast then slowing heart rate, he was treated with coreg and sent home. This morning around 0200/0300 he was awoken by the same fast then slowing then fast again heart rate.      Today the rapid Response team was called emergently to the Dialysis Unit for SVT. The patient had been on dialysis for 1hr 20mins and 950mls of  fluid had been removed. He was short of breath and in distress.      Past Medical History:   Diagnosis Date    CKD (chronic  kidney disease) stage 3, GFR 30-59 ml/min     Hypertension     Tachycardia       Past Surgical History:   Procedure Laterality Date    CARDIAC SURGERY      aortic valve replacement      Review of patient's allergies indicates:  No Known Allergies    History reviewed. No pertinent family history.   Social History   Substance Use Topics    Smoking status: Never Smoker    Smokeless tobacco: None    Alcohol use No      Comment: no longer drinks since surgery         Inpatient Medications:  Continuous Infusions:   heparin (porcine) in D5W 29 Units/kg/hr (03/15/17 0722)      Scheduled Meds:   sodium chloride 0.9%   Intravenous Once    amlodipine  5 mg Oral Daily    hydrALAZINE  100 mg Oral Q8H    lisinopril  20 mg Oral Daily    metoprolol  5 mg Intravenous Once    metoprolol succinate  200 mg Oral Daily    pantoprazole  40 mg Oral Daily    sevelamer carbonate  800 mg Oral TID WM    sodium chloride 0.9%  3 mL Intravenous Q8H    warfarin  5 mg Oral Daily      PRN Meds:.sodium chloride 0.9%, acetaminophen, heparin (porcine), heparin (PORCINE), ondansetron, ondansetron, oxycodone, polyethylene glycol, sodium chloride 0.9%, tramadol     Review of Systems:JENNIFER        OBJECTIVE:     Vital Signs (Most Recent):  Temp: 98.9 °F (37.2 °C) (03/15/17 0800)  Pulse: (!) 124 (03/15/17 0915)  Resp: 18 (03/15/17 0915)  BP: (!) 139/93 (03/15/17 0915)  SpO2: 99 % (03/15/17 0400) Vital Signs (24h Range):  Temp:  [98.5 °F (36.9 °C)-99.1 °F (37.3 °C)] 98.9 °F (37.2 °C)  Pulse:  [] 124  Resp:  [16-18] 18  SpO2:  [96 %-99 %] 99 %  BP: (122-165)/() 139/93     I & O (24h):    Intake/Output Summary (Last 24 hours) at 03/15/17 0933  Last data filed at 03/15/17 0500   Gross per 24 hour   Intake              400 ml   Output              450 ml   Net              -50 ml        Physical Exam:  GA: Positive distress.   HEENT: No visible oropharyngeal abnormalities.    Pulmonary: Positive accessory muscle use. Decreased bases  bilaterally.    Cardiac: SVT. No lower extremity edema.   Abdominal: Bowel sounds present   Skin: No jaundice or rashes   Musculoskeletal: Moves all extremities   Extremities: No clubbing or cyanosis.  Neuro:  --GCS: E4 V5 M6  --Mental Status:  oriented  --RASS: )  --Pupils PERRL.    Lines/Drains/Airway:       Hemodialysis Catheter right subclavian (Active)   Site Assessment Clean;Dry;No redness;Intact;No swelling 3/14/2017  8:00 PM   Status Deaccessed 3/14/2017  8:00 PM   Flows Good 3/14/2017  8:00 PM   Dressing Intervention Dressing changed 3/14/2017  8:00 PM   Dressing Status Clean;Dry;Intact 3/14/2017  8:00 PM   Dressing Change Due 03/17/17 3/14/2017  8:00 PM   Site Condition No complications 3/14/2017  8:00 PM   Dressing Gauze 3/14/2017  8:00 PM   Daily Line Review Performed 3/13/2017 12:45 PM   Arterial Volume (mL) 2.1 mL 3/13/2017 12:45 PM   Venous Volume (mL) 2.2 mL 3/13/2017 12:45 PM          Hemodialysis Catheter right subclavian (Active)   Site Assessment Clean;Dry;No redness;Intact;No swelling 3/14/2017  8:00 PM   Status Deaccessed 3/14/2017  8:00 PM   Flows Good 3/14/2017  8:00 PM   Dressing Intervention Dressing changed 3/14/2017  8:00 PM   Dressing Status Clean;Dry;Intact 3/14/2017  8:00 PM   Dressing Change Due 03/17/17 3/14/2017  8:00 PM   Site Condition No complications 3/14/2017  8:00 PM   Dressing Gauze 3/14/2017  8:00 PM   Daily Line Review Performed 3/13/2017 12:45 PM   Arterial Volume (mL) 2.1 mL 3/13/2017 12:45 PM   Venous Volume (mL) 2.2 mL 3/13/2017 12:45 PM        Nutrition:  Current Diet Order   Procedures    Diet Cardiac         Labs:  CBC/Anemia Profile:     Recent Labs  Lab 03/09/17  0928 03/09/17  1215 03/10/17  0451  03/13/17  0510 03/14/17  0503 03/15/17  0429   WBC 7.73  --   --   < > 8.02 8.70 8.73   HGB 6.6*  --   --   < > 8.0* 8.3* 8.2*   HCT 20.9*  --   --   < > 24.1* 25.0* 24.6*     --   --   < > 233 222 218   MCV 95  --   --   < > 90 91 90   RDW 17.8*  --   --   < >  17.4* 17.5* 17.2*   IRON 29*  --  30*  --   --   --   --    FERRITIN  --   --  1201*  --   --   --   --    RETIC  --   --  2.3*  --   --   --   --    FOLATE  --  10.8  --   --   --   --   --    < > = values in this interval not displayed.     Coags:     Recent Labs  Lab 03/09/17  0928  03/13/17  0510 03/14/17  0503 03/15/17  0429   INR 1.7*  < > 1.7* 1.6* 1.5*   APTT 29.1  --   --   --   --    < > = values in this interval not displayed.     Chemistries:     Recent Labs  Lab 03/09/17  0928  03/13/17  0510 03/14/17  0503 03/15/17  0429     < > 133* 135* 133*   K 3.5  < > 4.4 4.3 4.4   CL 98  < > 103 102 99   CO2 29  < > 18* 21* 22*   BUN 28*  < > 48* 34* 49*   CREATININE 4.5*  < > 6.5* 4.8* 5.8*   CALCIUM 8.6*  < > 9.0 8.5* 8.6*   PROT 7.7  --   --   --   --    BILITOT 0.6  --   --   --   --    ALKPHOS 100  --   --   --   --    ALT 23  --   --   --   --    AST 16  --   --   --   --    MG  --   < > 2.0 1.8 2.5   PHOS  --   < > 3.8 3.5 4.1   < > = values in this interval not displayed.     Urine Studies:   Lab Results   Component Value Date    COLORU Yellow 03/14/2017    APPEARANCEUA Clear 03/14/2017    PHUR 7.0 03/14/2017    SPECGRAV 1.015 03/14/2017    PROTEINUA 2+ (A) 03/14/2017    GLUCUA Negative 03/14/2017    KETONESU Negative 03/14/2017    BILIRUBINUA Negative 03/14/2017    OCCULTUA Negative 03/14/2017    NITRITE Negative 03/14/2017    UROBILINOGEN Negative 03/14/2017    LEUKOCYTESUR Negative 03/14/2017    RBCUA 2 03/14/2017    WBCUA 3 03/14/2017    BACTERIA None 03/14/2017    SQUAMEPITHEL 0 07/12/2016    HYALINECASTS 0 03/14/2017        Lactic Acid:   No results found for: LACTATE     Cardiac Enzymes:   No results for input(s): CPK, CPKMB, MB, TROPONINI in the last 72 hours.     Arterial Blood Gas:   No results for input(s): PH, PCO2, HCO3, POCSATURATED, BE in the last 24 hours.    Invalid input(s):  PO2     Microbiology Results (last 7 days)     Procedure Component Value Units Date/Time    Blood culture  [[id number]] Collected:  03/09/17 2151    Order Status:  Completed Specimen:  Blood Updated:  03/14/17 2312     Blood Culture, Routine No growth after 5 days.    Narrative:       Collection has been rescheduled by CAB2 at 3/9/2017 17:26 Reason:   patient is receiving blood   Collection has been rescheduled by CAB2 at 3/9/2017 21:10 Reason:   nurse will call when blood is finish   Collection has been rescheduled by CAB2 at 3/9/2017 17:26 Reason:   patient is receiving blood   Collection has been rescheduled by CAB2 at 3/9/2017 21:10 Reason:   nurse will call when blood is finish     Blood culture [[id number]] Collected:  03/14/17 1128    Order Status:  Completed Specimen:  Blood Updated:  03/14/17 2115     Blood Culture, Routine No Growth to date    Narrative:       Collection has been rescheduled by SRO at 3/14/2017 10:32 Reason:   patient in x-ray  Collection has been rescheduled by SRO at 3/14/2017 10:32 Reason:   patient in x-ray    Blood culture [[id number]] Collected:  03/14/17 1128    Order Status:  Completed Specimen:  Blood Updated:  03/14/17 2115     Blood Culture, Routine No Growth to date    Narrative:       Collection has been rescheduled by SRO at 3/14/2017 10:32 Reason:   patient in x-ray  Collection has been rescheduled by SRO at 3/14/2017 10:32 Reason:   patient in x-ray    Urine culture [[id number]] Collected:  03/14/17 1716    Order Status:  Sent Specimen:  Urine from Urine, Clean Catch Updated:  03/14/17 1820    Culture, Body Fluid - Bactec [[id number]] Collected:  03/10/17 1724    Order Status:  Completed Specimen:  Body Fluid from Thoracentesis Fluid Updated:  03/14/17 1622     Body Fluid Culture, Sterile No Growth to date     Body Fluid Culture, Sterile No Growth to date    Urine culture [[id number]]  (Susceptibility) Collected:  03/10/17 2157    Order Status:  Completed Specimen:  Urine from Urine, Clean Catch Updated:  03/13/17 1023     Urine Culture, Routine --     ESCHERICHIA COLI  10,000 -  49,999 cfu/ml  No other significant isolate      Gram stain [[id number]]     Order Status:  No result Specimen:  Pleural Fluid from Pleural Fluid     Gram stain [[id number]] Collected:  03/09/17 1658    Order Status:  Completed Specimen:  Urine from Urine Updated:  03/09/17 2134     Gram Stain Result Rare WBC's      No organisms seen    Narrative:       [facility] add on per Dr. JOCELIN Bond order # [id number]   03/09/2017  19:51     Blood culture [[id number]]     Order Status:  Canceled Specimen:  Blood     Blood culture [[id number]]     Order Status:  Canceled Specimen:  Blood     Culture, Respiratory [[id number]]     Order Status:  No result Specimen:  Respiratory from Sputum, Expectorated     Blood culture [[id number]]     Order Status:  Canceled Specimen:  Blood           ASSESSMENT/PLAN:     Active Hospital Problems    Diagnosis    *Acute combined systolic and diastolic heart failure     Non ischemic DCM      Anemia of chronic disease    SVT (supraventricular tachycardia)    Pleural effusion    Severe protein-calorie malnutrition    ESRD (end stage renal disease)    S/P AVR (aortic valve replacement)    Adult congenital heart disease     nonrheumatic aortic valve stenosis s/p AVR 2/2017      Pulmonary HTN    Uncontrolled hypertension        SVT  Rate 188  Stat EKG- Sinus tach  Lopressor 5mg IVP- not given self terminated   Repeat labs/electrolytes  Patient on large amounts of Beta blocker which may be dialyzed out.  Also patient is -6.5 L since admission and may be dry.  Consider thoracentesis for pleural effusion      I spent >30 minutes reviewing patient records, examining, and counseling the patient with greater than 50% of the time spent with direct patient care and coordination.   Fiona Winterbottom APRN, CNS  Critical Care Medicine  [phone]

## 2017-03-15 NOTE — ASSESSMENT & PLAN NOTE
-SVT per EKG from OSH, in sinus tach on arrival to [facility], had ST with PAC's on tele most of hospitalization  -RFA (3/13) with findings of EPS with AT noted; aberrancy with RBBB (clinical) also noted, activation mapping with earliest signal near the lateral tricuspid valve - with RF tachycardia terminated and no longer inducible  -tachycardia multifactorial: questionably anemia induced, s/p 3 units of PRBC's (Iron/TIBC studies indicative of ACD) versus irritability from permacath resting in right atrium versus new onset HF  -SVT episode in HD this AM 3/15, resolved spontaneously, EKG without changes  -Tachycardia maintaining at 100-120 today, tolerating amlodipine 5 mg daily, hydralazine 100 mg TID, lisinopril 20 mg daily, and  metoprolol succinate 200 mg daily  -continue to monitor tele

## 2017-03-15 NOTE — PROGRESS NOTES
Progress Note  Nephrology    Admit Date: 3/9/2017   LOS: 6 days     SUBJECTIVE:     Follow-up For: ESRD    Interval follow up: NAEON. Patient had an episode of SVT today during HD. Urine output 450 ml since yesterday.     OBJECTIVE:     Vital Signs (Most Recent)  Temp: 98.9 °F (37.2 °C) (03/15/17 0800)  Pulse: (!) 124 (03/15/17 0915)  Resp: 18 (03/15/17 0915)  BP: (!) 139/93 (03/15/17 0915)  SpO2: 99 % (03/15/17 0400)    Vital Signs Range (Last 24H):  Temp:  [98.5 °F (36.9 °C)-99.1 °F (37.3 °C)]   Pulse:  []   Resp:  [16-18]   BP: (122-165)/()   SpO2:  [96 %-99 %]        Physical Exam:   General: No acute distress, well groomed, alert and oriented x 3  HEENT: Normocephalic, atraumatic, EOM's intact bilaterally, external inspection of ears and nose normal, moist mucous membranes, no oral ulcerations/lesions  Neck: Supple, symmetrical, trachea midline, no thyromegaly, no JVD  Respiratory: Decreased breath sounds at bases. Mild crackles bilaterally.   Cardiovacular: Tachycardic, mechanical heart sound.Midline chest scar.   Gastrointestinal: Soft, non-tender, bowel sounds normal, no hepatosplenomegaly  Musculoskeletal: No knee or ankle joint tenderness or swelling.   Extremities: No clubbing or cyanosis of bilateral upper extremities; trace lower extremity edema bilaterally, radial pulses 2+ bilaterally, symmetric  Skin: warm and dry; no rash on exposed skin    Laboratory:  CBC:     Recent Labs  Lab 03/15/17  0429   WBC 8.73   RBC 2.72*   HGB 8.2*   HCT 24.6*      MCV 90   MCH 30.1   MCHC 33.3     CMP:   Recent Labs  Lab 03/09/17  0928  03/15/17  0429   GLU 93  < > 78   CALCIUM 8.6*  < > 8.6*   ALBUMIN 2.0*  < > 1.9*   PROT 7.7  --   --      < > 133*   K 3.5  < > 4.4   CO2 29  < > 22*   CL 98  < > 99   BUN 28*  < > 49*   CREATININE 4.5*  < > 5.8*   ALKPHOS 100  --   --    ALT 23  --   --    AST 16  --   --    BILITOT 0.6  --   --    < > = values in this interval not displayed.    Diagnostic  Results:  Labs: Reviewed  X-Ray: Reviewed    ASSESSMENT/PLAN:     Active Hospital Problems    Diagnosis  POA    *Acute combined systolic and diastolic heart failure [I50.41]  Yes     Non ischemic DCM      Anemia of chronic disease [D63.8]  Yes    SVT (supraventricular tachycardia) [I47.1]  No    Pleural effusion [J90]  Yes    Severe protein-calorie malnutrition [E43]  Yes    ESRD (end stage renal disease) [N18.6]  Yes    S/P AVR (aortic valve replacement) [Z95.2]  Not Applicable    Adult congenital heart disease [Q24.9]  Not Applicable     nonrheumatic aortic valve stenosis s/p AVR 2/2017      Pulmonary HTN [I27.2]  Yes    Uncontrolled hypertension [I10]  Yes      Resolved Hospital Problems    Diagnosis Date Resolved POA    Volume depletion [E86.9] 03/11/2017 Yes     A 47 y.o. male presents with history of recent aortic valve replacement (2/8/17, 22 mm mechanical, indication: mod/severe aortic stenosis and regurgitation), tricuspid valve repair (28 mm annuloplasty), redo sternotomy, possible ASD closure as a child, ESRD on HD, pulmonary HTN, HTN who comes as a transfer from [place] for SVT-.     ESRD on IHD MWF  [facility]  Duration of outpatient dialysis session - 4 hours  Residual Renal Function - Yes  - Will provide dialysis for metabolic clearance and volume management x 4 hours  - Patient had an episode of SVT today during HD. Discussed with core team and believe that SVT are secondary to removing metoprolol medication during HD. Will discuss it with cardiology services for further recommendations  -   - Dialysate adjusted to current labs  - Start patient on Lasix 80 mg IV BID  Aceess: RIJ perm- a- cath       Anemia of Chronic Kidney Disease   - Target Hgb: 10- 11.5  - Currently not at goal (8.2)   - Ferritin 1,201  - Will continue epoetin 10,000 units MWF     MBD  - Continue binders as taken outpatient      HTN   - Well control BP, will continue to  monitor. Goal for BP is <130 mmHg SBP and BDP <80 mmHg      Patrick Ferrera   Nephrology fellow PGY- 5  [phone]       I have reviewed and concur with the fellow's history, physical, assessment, and plan. I have personally interviewed and examined the patient at bedside.

## 2017-03-15 NOTE — ASSESSMENT & PLAN NOTE
-sildenafil discontinued 3/14 per cardiology recommendations  -2D echo PaP 53, down from 110 pre-procedure

## 2017-03-15 NOTE — PROGRESS NOTES
"[facility] Hospital Medicine  Progress Note    Patient Name: Isidro White  MRN: [id number]  Patient Class: IP- Inpatient   Admission Date: 3/9/2017  Length of Stay: 6 days  Attending Physician: Penelope Davis MD  Primary Care Provider: Provider [facility] Medicine Team: [facility] J Katrin Calles PA-C    Subjective:     Principal Problem:Acute combined systolic and diastolic heart failure    HPI:  Mr. White is a 48 yo AAM who has PMH of AV replacement and TV repair on February 8, 2017, ASD closure at age 8, ESRD on HD M/W/F, pulmonary hypertension, and HTN. He presented to the ED from [facility] after being seen for SVT and given verapamil. On arrival to the ED he is noted to be in sinus tach around 105-110. He reports a similar episode earlier in the week 3/7/2017 where he was seen at [facility] for what he describes as a fast then slowing then fast then slowing heart rate, he was treated with coreg and sent home. This morning around 0200/0300 he was awoken by the same fast then slowing then fast again heart rate, he asked for assistance from his brother and they checked his blood pressure and heart rate which they report as being heart rate 179, and . Thus prompting his visit to the hospital today. He denies stabbing chest pain, but does however report chest tightness and shortness of breath. He denies any radiation of the midsternal chest tightness. He denies headache, cough, fever, chills, night sweats, N/V/D. He reports strict adherence to renal diet and "32 ounce" fluid restriction. He denies any caffeine intake or energy drink use. He denies anxiety in the past, but endorses stress over the last month with all his medical issues.     He also reports that on 3/4/17 he started to have bleeding from his mouth, nose, and ears. He was seen again at [facility] and given 10 mL of vitamin K for an INR level of 5, per brother. He has had no " further bleeding and denies tarry stools or blood in stools. As an ESRD patient he was dialyzed per his schedule on Wednesday 3/8/2017. He has a vas-cath in the right upper chest and denies bleeding from this site. He reports urinating 2-3 times per day yellow urine, denies hematuria.     He is being admitted to the Hospital Medicine Service for further evaluation.     Hospital Course:  Admitted to Hospital Medicine Service management, for acute SVT and acute anemia, he was transfused two units of blood, and was placed on heparin gtt for sub therapeutic INR of 1.7 in setting of recent mechanical AVR on 2/8/2017 (goal INR 2.0-3.0). He had significant bilateral pleural effusion's and a recent INR of 5, and trouble breathing.  We consulted pulmonary who initially stated no thoracentesis as they felt it was from heart failure and to diurese with dialysis and IV diuresis, Pulm appears to have sent fluid analysis however there were no specimens received in lab. Dialysis did not appear to decrese effusion size, pulmonology performed thoracentesis (3/10)  with 1500ml of serosang to sang fluid removal.  He then had a drop in h/h and there was concern was for rebleeding into the chest as he was noted to be hypotensive with dialysis (which necessitated albumin infusion) and he was transfused another unit of PRBC's. His breath sounds have improved and repeat CXR shows some improvement with continued RLL small amount of pleural effusion. He also has had improvement in shortness of breath and pleuritic pain. His sternal chest pain at incisional site is controlled with current pain med regimen.      As for his SVT, EP performed RFA (3/13) with findings of EPS with AT noted; aberrancy with RBBB (clinical) also noted, activation mapping with earliest signal near the lateral tricuspid valve - with RF tachycardia terminated and no longer inducible.  He was also dialyzed with 3 liter fluid removal 3/13 and again today 3/15 with net  removal of 850.  Had episode of  during HD today, resolved spontaneously, EKG obtained with no changes.  His BP has improved, heart rate remains tachycardiac in the 100-120 range. His HR and BP are being aggressively treated with Amlodipine 5 mg daily, hydralazine 100 mg TID, lisinopril 20 mg daily, and metoprolol succinate 200 mg daily starting tomorrow. Sildenafil discontinued yesterday. Consider adding imdur tomorrow in place of amlodipine.  Blood cultures with NGTD at , urine culture still pending. 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 Vascular surgery has been consulted to assess for access in order to remove tunneled HD cath, plans for accuseal graft tomorrow.       Interval History: No events overnight, however had episode of SVT in HD today, resolved spontaneously. He reports some shortness of breath with that but that it has resolved as well. He is sitting on the side of the bed this morning, only complaint is constipation.     Review of Systems   Constitutional: Positive for fatigue. Negative for appetite change, chills and fever.   Respiratory: Positive for shortness of breath (reports during elevated HR while on HD). Negative for chest tightness.    Cardiovascular: Negative for chest pain and leg swelling.   Gastrointestinal: Positive for constipation. Negative for abdominal pain, nausea and vomiting.   Neurological: Negative for dizziness, light-headedness and headaches.     Objective:     Vital Signs (Most Recent):  Temp: 98.7 °F (37.1 °C) (03/15/17 1200)  Pulse: (!) 122 (03/15/17 1300)  Resp: 19 (03/15/17 1200)  BP: 127/85 (03/15/17 1200)  SpO2: 97 % (03/15/17 1200) Vital Signs (24h Range):  Temp:  [98.5 °F (36.9 °C)-99.1 °F (37.3 °C)] 98.7 °F (37.1 °C)  Pulse:  [] 122  Resp:  [18-19] 19  SpO2:  [96 %-99 %] 97 %  BP: (121-165)/() 127/85     Weight: 74 kg (163 lb 2.3 oz)  Body mass index is 20.95 kg/(m^2).    Intake/Output Summary (Last 24  hours) at 03/15/17 1336  Last data filed at 03/15/17 0920   Gross per 24 hour   Intake              680 ml   Output             1700 ml   Net            -1020 ml      Physical Exam   Constitutional: He is oriented to person, place, and time. He appears well-developed and well-nourished. No distress.   Neck: No JVD present.   Cardiovascular: Regular rhythm, normal heart sounds and intact distal pulses.  Tachycardia present.    No murmur heard.  Pulmonary/Chest: Effort normal. He has decreased breath sounds in the right middle field and the right lower field.   Musculoskeletal: He exhibits no edema.   Neurological: He is alert and oriented to person, place, and time.   Skin: Skin is warm and dry.       Significant Labs:   BMP:   Recent Labs  Lab 03/15/17  0429   GLU 78   *   K 4.4   CL 99   CO2 22*   BUN 49*   CREATININE 5.8*   CALCIUM 8.6*   MG 2.5     CBC:   Recent Labs  Lab 03/14/17  0503 03/15/17  0429   WBC 8.70 8.73   HGB 8.3* 8.2*   HCT 25.0* 24.6*    218     CMP:   Recent Labs  Lab 03/14/17  0503 03/15/17  0429   * 133*   K 4.3 4.4    99   CO2 21* 22*    78   BUN 34* 49*   CREATININE 4.8* 5.8*   CALCIUM 8.5* 8.6*   ALBUMIN 2.0* 1.9*   ANIONGAP 12 12   EGFRNONAA 13.5* 10.7*     All pertinent labs within the past 24 hours have been reviewed.    Significant Imaging: I have reviewed all pertinent imaging results/findings within the past 24 hours.    Assessment/Plan:      * Acute combined systolic and diastolic heart failure  -New onset systolic heart failure along with long standing PulmHTN from congential AS, now with volume overload as RAP are 15, new onset BIV failure  -2D echo: EF 30-35% concentric hypertrophy,  increased density to the myocardium, suggesting a myocardial infiltrative process (clinical correlation required), right atrial enlargement, right ventricular hypertrophy, moderately to severely depressed right ventricular systolic function, pulmonary hypertension,  estimated PA systolic pressure is 53 mmHg.   -tolerating amlodipine 5 mg daily, hydralazine 100 mg TID, lisinopril 20 mg daily, and metoprolol succinate 200 mg daily  -sildenafil discontinued, PAP's have improved since AVR  -dialyzed 3/14 with 3 L fluid removal, dialyzed with 850 mL removal today--treatment discontinued early due to SVT epidsode  -still makes some urine, Nephrology following, I&O/weight noted    SVT (supraventricular tachycardia)  -SVT per EKG from OSH, in sinus tach on arrival to [facility], had ST with PAC's on tele most of hospitalization  -RFA (3/13) with findings of EPS with AT noted; aberrancy with RBBB (clinical) also noted, activation mapping with earliest signal near the lateral tricuspid valve - with RF tachycardia terminated and no longer inducible  -tachycardia multifactorial: questionably anemia induced, s/p 3 units of PRBC's (Iron/TIBC studies indicative of ACD) versus irritability from permacath resting in right atrium versus new onset HF  -SVT episode in HD this AM 3/15, resolved spontaneously, EKG without changes  -Tachycardia maintaining at 100-120 today, tolerating amlodipine 5 mg daily, hydralazine 100 mg TID, lisinopril 20 mg daily, and  metoprolol succinate 200 mg daily  -continue to monitor tele    S/P AVR (aortic valve replacement)  -treating with colchicine 0.3 mg weekly for inflammatory post pericardiotomy syndrome; sed rate and CRP elevated  -blood cultures with NGTD and urine culture pending, to r/o infectious process, afebrile  -on heparin gtt, continue until the AM>>> per vascular instructions  -will hold warfarin tonight-pending accuseal graft placement tomorrow, goal 2-3 (AVR 2/8/2017)   -will increase dose to 7.5 for tomorrow evening  -recent h/o INR 5, + bleeding from nose and mouth, s/p vitamin K given at OSH    ESRD (end stage renal disease)  - dialysis 3/14 with 3 liter fluid removal and today 3/15 with 850 mL removal, early termination of HD s/t SVT  -potassium 4.4  today, mag 2.5  -lasix 80 mg IVP BID-per nephrology recommendations  - nephrology following    Anemia of chronic disease  -receiving epoetin 10,000 units MWF  -h/h remains at baseline today, will continue to monitor  -iron panel appears to be ACD    Uncontrolled hypertension  -BP improved, see above medication regimen  -weaned off home clonidine    Pleural effusion  -pulmonology performed thoracentesis (3/10) with 1500ml serosang/ sang tap, specimen not received in lab post tap  -CXR 3/14 with small amount of pleural effusion noted on right, left lung clear  - IS/flutter, cough deep breath/ heart pillow given, encouraged oob  -will consider CT chest if breath sounds not improved overnight with continued diuresis     Pulmonary HTN  -sildenafil discontinued 3/14 per cardiology recommendations  -2D echo PaP 53, down from 110 pre-procedure    Severe protein-calorie malnutrition  -nutritional referral and dietary consult education, needs teaching regarding renal diet and coumadin diet as well as appropriate fluid restriction of 1500 mL/day  -pre albumin 13 and albumin 2  -nepro renal supplements ordered and encouraged patient to consume    Adult congenital heart disease  - ASD closure at age 8  - Nonrheumatic aortic valve stenosis s/p AVR 2/2017    VTE Risk Mitigation         Ordered     warfarin (COUMADIN) tablet 7.5 mg  Daily     Route:  Oral        03/15/17 1326          Stacy Flynn NP  Department of Hospital Medicine   [facility]

## 2017-03-15 NOTE — ASSESSMENT & PLAN NOTE
-treating with colchicine 0.3 mg weekly for inflammatory post pericardiotomy syndrome; sed rate and CRP elevated  -blood cultures with NGTD and urine culture pending, to r/o infectious process, afebrile  -on heparin gtt, continue until the AM>>> per vascular instructions  -will hold warfarin tonight-pending accuseal graft placement tomorrow, goal 2-3 (AVR 2/8/2017)   -will increase dose to 7.5 for tomorrow evening  -recent h/o INR 5, + bleeding from nose and mouth, s/p vitamin K given at OSH

## 2017-03-15 NOTE — ASSESSMENT & PLAN NOTE
-pulmonology performed thoracentesis (3/10) with 1500ml serosang/ sang tap, specimen not received in lab post tap  -CXR 3/14 with small amount of pleural effusion noted on right, left lung clear  - IS/flutter, cough deep breath/ heart pillow given, encouraged oob  -will consider CT chest if breath sounds not improved overnight with continued diuresis

## 2017-03-15 NOTE — PROGRESS NOTES
Patient seen and examined.  He was readmitted with SVT last week prior to returning to our clinic for his post op visit.    Sternum: stable  Incision: healing nicely.  May shower.  Keep incision cleaned with soap and water.    Assessment:  S/P AVR  S/p TVr    Plan:  He can begin light lifting of 20 pounds or less next week  He should still continue sternal precautions until that time  Can begin cardiac rehab at any time in [place] when he is ready    No follow up appt necessary in CV surgery clinic.  Call prn.

## 2017-03-15 NOTE — PROGRESS NOTES
Pt arrived via stretcher.  Placed on cardiac monitor and b/p check every 15 minutes.  Right subclavian  Dialysis access prep with prevantic swab.  maintenace.  Hemodialysis started per orders.

## 2017-03-16 ENCOUNTER — ANESTHESIA (OUTPATIENT)
Dept: SURGERY | Facility: HOSPITAL | Age: 47
DRG: 273 | End: 2017-03-16
Payer: MEDICAID

## 2017-03-16 LAB
ALBUMIN SERPL BCP-MCNC: 2 G/DL
ANA SER QL IF: NORMAL
ANION GAP SERPL CALC-SCNC: 10 MMOL/L
BASOPHILS # BLD AUTO: 0.02 K/UL
BASOPHILS NFR BLD: 0.2 %
BUN SERPL-MCNC: 47 MG/DL
CALCIUM SERPL-MCNC: 8.6 MG/DL
CHLORIDE SERPL-SCNC: 102 MMOL/L
CO2 SERPL-SCNC: 23 MMOL/L
CREAT SERPL-MCNC: 5.7 MG/DL
DIFFERENTIAL METHOD: ABNORMAL
DSDNA AB SER-ACNC: NORMAL [IU]/ML
EOSINOPHIL # BLD AUTO: 0.3 K/UL
EOSINOPHIL NFR BLD: 3.1 %
ERYTHROCYTE [DISTWIDTH] IN BLOOD BY AUTOMATED COUNT: 16.9 %
EST. GFR  (AFRICAN AMERICAN): 12.7 ML/MIN/1.73 M^2
EST. GFR  (NON AFRICAN AMERICAN): 11 ML/MIN/1.73 M^2
FACT X PPP CHRO-ACNC: 0.37 IU/ML
GLUCOSE SERPL-MCNC: 113 MG/DL
HCT VFR BLD AUTO: 22.7 %
HGB BLD-MCNC: 7.3 G/DL
INR PPP: 1.5
LYMPHOCYTES # BLD AUTO: 1 K/UL
LYMPHOCYTES NFR BLD: 11.8 %
MCH RBC QN AUTO: 29.8 PG
MCHC RBC AUTO-ENTMCNC: 32.2 %
MCV RBC AUTO: 93 FL
MONOCYTES # BLD AUTO: 1.1 K/UL
MONOCYTES NFR BLD: 13.3 %
NEUTROPHILS # BLD AUTO: 5.9 K/UL
NEUTROPHILS NFR BLD: 71.5 %
PHOSPHATE SERPL-MCNC: 3.9 MG/DL
PLATELET # BLD AUTO: 200 K/UL
PMV BLD AUTO: 8.9 FL
POC ACTIVATED CLOTTING TIME K: 255 SEC (ref 74–137)
POCT GLUCOSE: 120 MG/DL (ref 70–110)
POCT GLUCOSE: 129 MG/DL (ref 70–110)
POCT GLUCOSE: 89 MG/DL (ref 70–110)
POTASSIUM SERPL-SCNC: 4.5 MMOL/L
PROTHROMBIN TIME: 15.7 SEC
RBC # BLD AUTO: 2.45 M/UL
SAMPLE: ABNORMAL
SODIUM SERPL-SCNC: 135 MMOL/L
WBC # BLD AUTO: 8.19 K/UL

## 2017-03-16 PROCEDURE — 25000003 PHARM REV CODE 250: Performed by: NURSE PRACTITIONER

## 2017-03-16 PROCEDURE — 25000003 PHARM REV CODE 250: Performed by: INTERNAL MEDICINE

## 2017-03-16 PROCEDURE — 80069 RENAL FUNCTION PANEL: CPT

## 2017-03-16 PROCEDURE — 63600175 PHARM REV CODE 636 W HCPCS: Performed by: ANESTHESIOLOGY

## 2017-03-16 PROCEDURE — 25000003 PHARM REV CODE 250: Performed by: EMERGENCY MEDICINE

## 2017-03-16 PROCEDURE — 36000707: Performed by: SURGERY

## 2017-03-16 PROCEDURE — C1768 GRAFT, VASCULAR: HCPCS | Performed by: SURGERY

## 2017-03-16 PROCEDURE — 27201423 OPTIME MED/SURG SUP & DEVICES STERILE SUPPLY: Performed by: SURGERY

## 2017-03-16 PROCEDURE — 85520 HEPARIN ASSAY: CPT

## 2017-03-16 PROCEDURE — 94761 N-INVAS EAR/PLS OXIMETRY MLT: CPT

## 2017-03-16 PROCEDURE — D9220A PRA ANESTHESIA: Mod: ANES,,, | Performed by: ANESTHESIOLOGY

## 2017-03-16 PROCEDURE — 71000039 HC RECOVERY, EACH ADD'L HOUR: Performed by: SURGERY

## 2017-03-16 PROCEDURE — 99232 SBSQ HOSP IP/OBS MODERATE 35: CPT | Mod: ,,, | Performed by: PHYSICIAN ASSISTANT

## 2017-03-16 PROCEDURE — 25000003 PHARM REV CODE 250: Performed by: SURGERY

## 2017-03-16 PROCEDURE — 37000008 HC ANESTHESIA 1ST 15 MINUTES: Performed by: SURGERY

## 2017-03-16 PROCEDURE — 85025 COMPLETE CBC W/AUTO DIFF WBC: CPT

## 2017-03-16 PROCEDURE — 97802 MEDICAL NUTRITION INDIV IN: CPT

## 2017-03-16 PROCEDURE — 36000706: Performed by: SURGERY

## 2017-03-16 PROCEDURE — 63600175 PHARM REV CODE 636 W HCPCS: Performed by: REGISTERED NURSE

## 2017-03-16 PROCEDURE — 25000003 PHARM REV CODE 250: Performed by: PHYSICIAN ASSISTANT

## 2017-03-16 PROCEDURE — 27200671 HC STIMUCATH NEEDLE/ CATHETER: Performed by: ANESTHESIOLOGY

## 2017-03-16 PROCEDURE — 86225 DNA ANTIBODY NATIVE: CPT

## 2017-03-16 PROCEDURE — 37000009 HC ANESTHESIA EA ADD 15 MINS: Performed by: SURGERY

## 2017-03-16 PROCEDURE — 25000003 PHARM REV CODE 250: Performed by: REGISTERED NURSE

## 2017-03-16 PROCEDURE — 86038 ANTINUCLEAR ANTIBODIES: CPT

## 2017-03-16 PROCEDURE — 83516 IMMUNOASSAY NONANTIBODY: CPT

## 2017-03-16 PROCEDURE — 85610 PROTHROMBIN TIME: CPT

## 2017-03-16 PROCEDURE — D9220A PRA ANESTHESIA: Mod: CRNA,,, | Performed by: REGISTERED NURSE

## 2017-03-16 PROCEDURE — 71000033 HC RECOVERY, INTIAL HOUR: Performed by: SURGERY

## 2017-03-16 PROCEDURE — 36830 ARTERY-VEIN NONAUTOGRAFT: CPT | Mod: ,,, | Performed by: SURGERY

## 2017-03-16 PROCEDURE — 20600001 HC STEP DOWN PRIVATE ROOM

## 2017-03-16 PROCEDURE — 99231 SBSQ HOSP IP/OBS SF/LOW 25: CPT | Mod: ,,, | Performed by: INTERNAL MEDICINE

## 2017-03-16 PROCEDURE — 63600175 PHARM REV CODE 636 W HCPCS: Performed by: NURSE PRACTITIONER

## 2017-03-16 PROCEDURE — 03180AD BYPASS LEFT BRACHIAL ARTERY TO UPPER ARM VEIN WITH AUTOLOGOUS ARTERIAL TISSUE, OPEN APPROACH: ICD-10-PCS | Performed by: SURGERY

## 2017-03-16 DEVICE — GRAFT ACUSEAL CARDIO 45CM
Type: IMPLANTABLE DEVICE | Site: ARM | Status: NON-FUNCTIONAL
Removed: 2020-07-02

## 2017-03-16 RX ORDER — SODIUM CHLORIDE 0.9 % (FLUSH) 0.9 %
3 SYRINGE (ML) INJECTION
Status: DISCONTINUED | OUTPATIENT
Start: 2017-03-16 | End: 2017-03-16 | Stop reason: HOSPADM

## 2017-03-16 RX ORDER — HEPARIN SODIUM 1000 [USP'U]/ML
INJECTION, SOLUTION INTRAVENOUS; SUBCUTANEOUS
Status: DISCONTINUED | OUTPATIENT
Start: 2017-03-16 | End: 2017-03-16 | Stop reason: HOSPADM

## 2017-03-16 RX ORDER — MIDAZOLAM HYDROCHLORIDE 1 MG/ML
INJECTION, SOLUTION INTRAMUSCULAR; INTRAVENOUS
Status: DISCONTINUED | OUTPATIENT
Start: 2017-03-16 | End: 2017-03-16

## 2017-03-16 RX ORDER — FENTANYL CITRATE 50 UG/ML
INJECTION, SOLUTION INTRAMUSCULAR; INTRAVENOUS
Status: DISCONTINUED | OUTPATIENT
Start: 2017-03-16 | End: 2017-03-16

## 2017-03-16 RX ORDER — PHENYLEPHRINE HYDROCHLORIDE 10 MG/ML
INJECTION INTRAVENOUS
Status: DISCONTINUED | OUTPATIENT
Start: 2017-03-16 | End: 2017-03-16

## 2017-03-16 RX ORDER — ONDANSETRON 2 MG/ML
INJECTION INTRAMUSCULAR; INTRAVENOUS
Status: DISCONTINUED | OUTPATIENT
Start: 2017-03-16 | End: 2017-03-16

## 2017-03-16 RX ORDER — ACETAMINOPHEN 10 MG/ML
1000 INJECTION, SOLUTION INTRAVENOUS ONCE
Status: COMPLETED | OUTPATIENT
Start: 2017-03-16 | End: 2017-03-16

## 2017-03-16 RX ORDER — BACITRACIN 50000 [IU]/1
INJECTION, POWDER, FOR SOLUTION INTRAMUSCULAR
Status: DISCONTINUED | OUTPATIENT
Start: 2017-03-16 | End: 2017-03-16 | Stop reason: HOSPADM

## 2017-03-16 RX ORDER — HYDROMORPHONE HYDROCHLORIDE 1 MG/ML
0.2 INJECTION, SOLUTION INTRAMUSCULAR; INTRAVENOUS; SUBCUTANEOUS EVERY 5 MIN PRN
Status: COMPLETED | OUTPATIENT
Start: 2017-03-16 | End: 2017-03-16

## 2017-03-16 RX ORDER — SODIUM CHLORIDE 0.9 % (FLUSH) 0.9 %
3 SYRINGE (ML) INJECTION
Status: DISCONTINUED | OUTPATIENT
Start: 2017-03-16 | End: 2017-03-19 | Stop reason: HOSPADM

## 2017-03-16 RX ORDER — SODIUM CHLORIDE 0.9 % (FLUSH) 0.9 %
3 SYRINGE (ML) INJECTION EVERY 8 HOURS
Status: DISCONTINUED | OUTPATIENT
Start: 2017-03-16 | End: 2017-03-16 | Stop reason: HOSPADM

## 2017-03-16 RX ORDER — PROPOFOL 10 MG/ML
VIAL (ML) INTRAVENOUS CONTINUOUS PRN
Status: DISCONTINUED | OUTPATIENT
Start: 2017-03-16 | End: 2017-03-16

## 2017-03-16 RX ORDER — HEPARIN SODIUM 10000 [USP'U]/100ML
INJECTION, SOLUTION INTRAVENOUS CONTINUOUS PRN
Status: DISCONTINUED | OUTPATIENT
Start: 2017-03-16 | End: 2017-03-16

## 2017-03-16 RX ORDER — VASOPRESSIN 20 [USP'U]/ML
INJECTION, SOLUTION INTRAMUSCULAR; SUBCUTANEOUS
Status: DISCONTINUED | OUTPATIENT
Start: 2017-03-16 | End: 2017-03-16

## 2017-03-16 RX ORDER — ISOSORBIDE MONONITRATE 30 MG/1
30 TABLET, EXTENDED RELEASE ORAL DAILY
Status: DISCONTINUED | OUTPATIENT
Start: 2017-03-16 | End: 2017-03-19 | Stop reason: HOSPADM

## 2017-03-16 RX ORDER — ONDANSETRON 2 MG/ML
4 INJECTION INTRAMUSCULAR; INTRAVENOUS ONCE AS NEEDED
Status: DISCONTINUED | OUTPATIENT
Start: 2017-03-16 | End: 2017-03-16 | Stop reason: HOSPADM

## 2017-03-16 RX ORDER — KETAMINE HYDROCHLORIDE 100 MG/ML
INJECTION, SOLUTION INTRAMUSCULAR; INTRAVENOUS
Status: DISCONTINUED | OUTPATIENT
Start: 2017-03-16 | End: 2017-03-16

## 2017-03-16 RX ADMIN — Medication 3 ML: at 01:03

## 2017-03-16 RX ADMIN — ISOSORBIDE MONONITRATE 30 MG: 30 TABLET, EXTENDED RELEASE ORAL at 01:03

## 2017-03-16 RX ADMIN — FUROSEMIDE 80 MG: 10 INJECTION, SOLUTION INTRAMUSCULAR; INTRAVENOUS at 01:03

## 2017-03-16 RX ADMIN — HYDRALAZINE HYDROCHLORIDE 100 MG: 50 TABLET ORAL at 02:03

## 2017-03-16 RX ADMIN — MIDAZOLAM HYDROCHLORIDE 2 MG: 1 INJECTION, SOLUTION INTRAMUSCULAR; INTRAVENOUS at 07:03

## 2017-03-16 RX ADMIN — ONDANSETRON 4 MG: 2 INJECTION INTRAMUSCULAR; INTRAVENOUS at 10:03

## 2017-03-16 RX ADMIN — ACETAMINOPHEN 650 MG: 325 TABLET ORAL at 08:03

## 2017-03-16 RX ADMIN — OXYCODONE HYDROCHLORIDE 10 MG: 5 TABLET ORAL at 11:03

## 2017-03-16 RX ADMIN — LISINOPRIL 20 MG: 20 TABLET ORAL at 11:03

## 2017-03-16 RX ADMIN — HEPARIN SODIUM AND DEXTROSE 31 UNITS/KG/HR: 10000; 5 INJECTION INTRAVENOUS at 07:03

## 2017-03-16 RX ADMIN — HYDROMORPHONE HYDROCHLORIDE 0.2 MG: 1 INJECTION, SOLUTION INTRAMUSCULAR; INTRAVENOUS; SUBCUTANEOUS at 11:03

## 2017-03-16 RX ADMIN — FENTANYL CITRATE 25 MCG: 50 INJECTION, SOLUTION INTRAMUSCULAR; INTRAVENOUS at 08:03

## 2017-03-16 RX ADMIN — WARFARIN SODIUM 7.5 MG: 7.5 TABLET ORAL at 05:03

## 2017-03-16 RX ADMIN — VASOPRESSIN 1 UNITS: 20 INJECTION, SOLUTION INTRAMUSCULAR; SUBCUTANEOUS at 09:03

## 2017-03-16 RX ADMIN — SEVELAMER CARBONATE 800 MG: 800 TABLET, FILM COATED ORAL at 06:03

## 2017-03-16 RX ADMIN — OXYCODONE HYDROCHLORIDE 10 MG: 5 TABLET ORAL at 03:03

## 2017-03-16 RX ADMIN — HYDRALAZINE HYDROCHLORIDE 100 MG: 50 TABLET ORAL at 08:03

## 2017-03-16 RX ADMIN — Medication 3 ML: at 05:03

## 2017-03-16 RX ADMIN — FENTANYL CITRATE 50 MCG: 50 INJECTION, SOLUTION INTRAMUSCULAR; INTRAVENOUS at 07:03

## 2017-03-16 RX ADMIN — ACETAMINOPHEN 1000 MG: 10 INJECTION, SOLUTION INTRAVENOUS at 11:03

## 2017-03-16 RX ADMIN — HYDRALAZINE HYDROCHLORIDE 100 MG: 50 TABLET ORAL at 05:03

## 2017-03-16 RX ADMIN — HYDROMORPHONE HYDROCHLORIDE 0.2 MG: 1 INJECTION, SOLUTION INTRAMUSCULAR; INTRAVENOUS; SUBCUTANEOUS at 12:03

## 2017-03-16 RX ADMIN — STANDARDIZED SENNA CONCENTRATE AND DOCUSATE SODIUM 1 TABLET: 8.6; 5 TABLET, FILM COATED ORAL at 01:03

## 2017-03-16 RX ADMIN — VANCOMYCIN HYDROCHLORIDE 1000 G: 1 INJECTION, POWDER, LYOPHILIZED, FOR SOLUTION INTRAVENOUS at 08:03

## 2017-03-16 RX ADMIN — SODIUM CHLORIDE: 0.9 INJECTION, SOLUTION INTRAVENOUS at 07:03

## 2017-03-16 RX ADMIN — POLYETHYLENE GLYCOL 3350 17 G: 17 POWDER, FOR SOLUTION ORAL at 01:03

## 2017-03-16 RX ADMIN — KETAMINE HYDROCHLORIDE 50 MG: 100 INJECTION, SOLUTION, CONCENTRATE INTRAMUSCULAR; INTRAVENOUS at 07:03

## 2017-03-16 RX ADMIN — Medication 3 ML: at 08:03

## 2017-03-16 RX ADMIN — OXYCODONE HYDROCHLORIDE 10 MG: 5 TABLET ORAL at 07:03

## 2017-03-16 RX ADMIN — Medication 1 MG: at 08:03

## 2017-03-16 RX ADMIN — FUROSEMIDE 80 MG: 10 INJECTION, SOLUTION INTRAMUSCULAR; INTRAVENOUS at 05:03

## 2017-03-16 RX ADMIN — STANDARDIZED SENNA CONCENTRATE AND DOCUSATE SODIUM 1 TABLET: 8.6; 5 TABLET, FILM COATED ORAL at 08:03

## 2017-03-16 RX ADMIN — TRAMADOL HYDROCHLORIDE 100 MG: 50 TABLET, FILM COATED ORAL at 08:03

## 2017-03-16 RX ADMIN — PHENYLEPHRINE HYDROCHLORIDE 100 MCG: 10 INJECTION INTRAVENOUS at 09:03

## 2017-03-16 RX ADMIN — PANTOPRAZOLE SODIUM 40 MG: 40 TABLET, DELAYED RELEASE ORAL at 11:03

## 2017-03-16 RX ADMIN — METOPROLOL SUCCINATE 200 MG: 100 TABLET, FILM COATED, EXTENDED RELEASE ORAL at 01:03

## 2017-03-16 RX ADMIN — AMLODIPINE BESYLATE 5 MG: 5 TABLET ORAL at 11:03

## 2017-03-16 RX ADMIN — PROPOFOL 75 MCG/KG/MIN: 10 INJECTION, EMULSION INTRAVENOUS at 07:03

## 2017-03-16 RX ADMIN — SEVELAMER CARBONATE 800 MG: 800 TABLET, FILM COATED ORAL at 01:03

## 2017-03-16 NOTE — ANESTHESIA RELEASE NOTE
"Anesthesia Release from PACU Note    Patient: Isidro Lozano Jr White    Procedure(s) Performed: Procedure(s) (LRB):  [id number] Accuseal (Left)    Anesthesia type: general    Post pain: Adequate analgesia    Post assessment: no apparent anesthetic complications, tolerated procedure well and no evidence of recall    Last Vitals:   Visit Vitals    BP (!) 149/78    Pulse (!) 115    Temp 36.9 °C (98.5 °F) (Oral)    Resp 11    Ht 6' 2" (1.88 m)    Wt 74 kg (163 lb 2.3 oz)    SpO2 100%    BMI 20.95 kg/m2       Post vital signs: stable    Level of consciousness: awake, alert  and oriented    Nausea/Vomiting: no nausea/no vomiting    Complications: none    Airway Patency: patent    Respiratory: unassisted, spontaneous ventilation, room air    Cardiovascular: stable and blood pressure at baseline Baseline tachycardia.     Hydration: euvolemic  "

## 2017-03-16 NOTE — CONSULTS
[facility]  Adult Nutrition  Consult Note    SUMMARY     Recommendations    Recommendation/Intervention:   1. Recommend modifying diet order to Renal. Continue oral supplement. Renal diet contains Low Sodium restrictions.   2. Encourage good PO intake of meals. Will monitor.   Goals: PO intake >75%.   Nutrition Goal Status: new  Communication of RD Recs: reviewed with RN    Reason for Assessment    Reason for Assessment: physician consult  Diagnosis: other (see comments) (SVT)  Relevent Medical History: ESRD on HD, HTN   Nutrition Discharge Planning: Adequate PO intake for optimal nutrition.     Nutrition Prescription Ordered    Current Diet Order: Cardiac, Renal, coumadin restriction  Oral Nutrition Supplement: Novasource Renal TID     Nutrition Risk Screen     Nutrition Risk Screen: no indicators present    Nutrition/Diet History    Typical Food/Fluid Intake: Pt was NPO this AM for fistula placement. Pt restarted on diet just now.   Factors Affecting Nutritional Intake: other (see comments) (none reported)    Labs/Tests/Procedures/Meds    Pertinent Labs Reviewed: reviewed  Pertinent Labs Comments: Na 135, Ca 8.6, Alb 2  Pertinent Medications Reviewed: reviewed  Pertinent Medications Comments: lasix, coumadin    Physical Findings    Overall Physical Appearance: generalized wasting  Skin: other (see comments) (incision in chest)    Anthropometrics    Height (inches): 74.02 in  Weight Method: Standard Scale  Weight (kg): 74 kg  Ideal Body Weight (IBW), Male: 190.12 lb  % Ideal Body Weight, Male (lb): 85.81   BMI (kg/m2): 20.94  BMI Grade: 18.5-24.9 - normal    Estimated/Assessed Needs    Weight Used For Calorie Calculations: 74 kg (163 lb 2.3 oz)   Height (cm): 188 cm  Energy Need Method: [place]-St Jeor (1.3 PAL: 2200kcal)  RMR ([place]-St. Jeor Equation): 1692.94  Weight Used For Protein Calculations: 74 kg (163 lb 2.3 oz)  Protein Requirements: 88-103g (1.2-1.4g/kg)  Fluid Need Method: RDA  Method (or per MD)    Monitor and Evaluation    Food and Nutrient Intake: energy intake, food and beverage intake  Food and Nutrient Adminstration: diet order  Anthropometric Measurements: weight, weight change  Biochemical Data, Medical Tests and Procedures: other (specify) (All labs)  Nutrition-Focused Physical Findings: overall appearance    Nutrition Risk    Level of Risk: moderate    Nutrition Follow-Up    RD Follow-up?: Yes    Assessment and Plan    No nutrition related risk factor at this time.

## 2017-03-16 NOTE — PROGRESS NOTES
Report called to MEG Calvo who will assume pt care once transferred to room 345. RN notified of outstanding medications due but not received from pharmacy at this time. Will continue to monitor.

## 2017-03-16 NOTE — ASSESSMENT & PLAN NOTE
-IS/flutter, cough deep breath/ heart pillow given, encouraged oob  -continue to monitor with diuresis and dialysis, no distress or shortness of breath   -pulmonology performed thoracentesis (3/10) with 1500ml serosang/ sang tap, specimen not received in lab post tap  -CXR 3/14 with small amount of pleural effusion noted on right, left lung clear

## 2017-03-16 NOTE — ASSESSMENT & PLAN NOTE
-nutritional referral and dietary consult education, needs teaching regarding renal diet and coumadin diet as well as appropriate fluid restriction of 1500 mL/day-discussed diet with patient questions answered  -pre albumin 13 and albumin 2  -nepro renal supplements ordered and encouraged patient to consume  -PT evaluation for deconditioning and loss of muscle mass, will attempt to arrange outpt PT

## 2017-03-16 NOTE — ASSESSMENT & PLAN NOTE
-tachycardia maintaining at 100-120 today, tolerating hydralazine 100 mg TID, lisinopril 20 mg daily, and  metoprolol succinate 200 mg daily, will add isosorbide mononitrate 30 mg daily. Discontinue amlodipine.   -RFA (3/13) with findings of EPS with AT noted; aberrancy with RBBB (clinical) also noted, activation mapping with earliest signal near the lateral tricuspid valve - with RF tachycardia terminated and no longer inducible  -SVT likely multifactorial: questionably anemia induced, s/p 3 units of PRBC's (Iron/TIBC studies indicative of ACD) versus irritability from permacath resting in right atrium versus new onset HF  -SVT episode in HD this 3/15, resolved spontaneously, EKG without changes  -continue to monitor tele  -history of SVT per EKG from OSH, in sinus tach on arrival to [facility]

## 2017-03-16 NOTE — NURSING TRANSFER
Nursing Transfer Note      3/16/2017     Transfer To: [facility]-[address]    Transfer via stretcher    Transfer with cardiac monitor    Transported by Pct    Medicines sent: Yes    Chart send with patient: Yes    Notified: Maykel Zaldivar

## 2017-03-16 NOTE — PROGRESS NOTES
"[facility] Hospital Medicine  Progress Note    Patient Name: Isidro White  MRN: [id number]  Patient Class: IP- Inpatient   Admission Date: 3/9/2017  Length of Stay: 7 days  Attending Physician: Penelope Davis MD  Primary Care Provider: Provider [facility] Medicine Team: [facility]     Subjective:     Principal Problem:Acute combined systolic and diastolic heart failure    HPI:  Mr. White is a 48 yo AAM who has PMH of AV replacement and TV repair on February 8, 2017, ASD closure at age 8, ESRD on HD M/W/F, pulmonary hypertension, and HTN. He presented to the ED from [facility] after being seen for SVT and given verapamil. On arrival to the ED he is noted to be in sinus tach around 105-110. He reports a similar episode earlier in the week 3/7/2017 where he was seen at [facility] for what he describes as a fast then slowing then fast then slowing heart rate, he was treated with coreg and sent home. This morning around 0200/0300 he was awoken by the same fast then slowing then fast again heart rate, he asked for assistance from his brother and they checked his blood pressure and heart rate which they report as being heart rate 179, and . Thus prompting his visit to the hospital today. He denies stabbing chest pain, but does however report chest tightness and shortness of breath. He denies any radiation of the midsternal chest tightness. He denies headache, cough, fever, chills, night sweats, N/V/D. He reports strict adherence to renal diet and "32 ounce" fluid restriction. He denies any caffeine intake or energy drink use. He denies anxiety in the past, but endorses stress over the last month with all his medical issues.     He also reports that on 3/4/17 he started to have bleeding from his mouth, nose, and ears. He was seen again at [facility] and given 10 mL of vitamin K for an INR level of 5, per brother. He has had no further bleeding and denies " tarry stools or blood in stools. As an ESRD patient he was dialyzed per his schedule on Wednesday 3/8/2017. He has a vas-cath in the right upper chest and denies bleeding from this site. He reports urinating 2-3 times per day yellow urine, denies hematuria.     He is being admitted to the Hospital Medicine Service for further evaluation.       Hospital Course:  Admitted to Hospital Medicine Service management, for acute SVT and acute anemia, he was transfused two units of blood, and was placed on heparin gtt for sub therapeutic INR of 1.7 in setting of recent mechanical AVR on 2/8/2017 (goal INR 2.0-3.0). He had significant bilateral pleural effusion's and a recent INR of 5, and trouble breathing.  We consulted pulmonary who initially stated no thoracentesis as they felt it was from heart failure and to diurese with dialysis and IV diuresis, Pulm appears to have sent fluid analysis however there were no specimens received in lab. Dialysis did not appear to decrese effusion size, pulmonology performed thoracentesis (3/10)  with 1500ml of serosang to sang fluid removal.  He then had a drop in h/h and there was concern was for rebleeding into the chest as he was noted to be hypotensive with dialysis (which necessitated albumin infusion) and he was transfused another unit of PRBC's. His breath sounds have improved and repeat CXR shows some improvement with continued RLL small amount of pleural effusion. He also has had improvement in shortness of breath and pleuritic pain. His sternal chest pain at incisional site is controlled with current pain med regimen.      As for his SVT, EP performed RFA (3/13) with findings of EPS with AT noted; aberrancy with RBBB (clinical) also noted, activation mapping with earliest signal near the lateral tricuspid valve - with RF tachycardia terminated and no longer inducible.  He was also dialyzed with 3 liter fluid removal 3/13 and again 3/15 with net removal of 850.  Had episode of SVT  180 during HD today, resolved spontaneously, EKG obtained with no changes.  His BP has improved, heart rate remains tachycardiac in the 100-120 range. His HR and BP are being aggressively treated with hydralazine 100 mg TID, lisinopril 20 mg daily, and metoprolol succinate 200 mg daily. Will discontinue amlodipine 5 mg daily and add isosorbide mononitrate 30 mg daily for better morbidity/moprtality benefits. Sildenafil discontinued 3/14. Blood cultures and urine culture with NGTD. 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 SALIMA, anti-dsDNA, anti-histone to r/o drug induced lups. Tunneled HD cath removed evening of 3/15. LUE AVG / [place] Accuseal 4/7mm tapered early access graft placed today by vascular.       Interval History: Sitting on side of bed, no acute events or distress overnight. C/O 1/10 pain at new graft site. No chest pain, shortness of breath, or palpitations.     Review of Systems   Constitutional: Positive for fatigue. Negative for activity change, chills and fever.   Respiratory: Negative for cough, chest tightness and shortness of breath.    Cardiovascular: Negative for chest pain, palpitations and leg swelling.   Gastrointestinal: Positive for constipation. Negative for abdominal pain and nausea.   Neurological: Negative for dizziness, light-headedness and headaches.     Objective:     Vital Signs (Most Recent):  Temp: 98.6 °F (37 °C) (03/16/17 1300)  Pulse: (!) 120 (03/16/17 1300)  Resp: 16 (03/16/17 1300)  BP: 109/75 (03/16/17 1300)  SpO2: 99 % (03/16/17 1300) Vital Signs (24h Range):  Temp:  [98 °F (36.7 °C)-99.6 °F (37.6 °C)] 98.6 °F (37 °C)  Pulse:  [100-126] 120  Resp:  [8-22] 16  SpO2:  [92 %-100 %] 99 %  BP: (109-172)/(75-97) 109/75     Weight: 74 kg (163 lb 2.3 oz)  Body mass index is 20.95 kg/(m^2).    Intake/Output Summary (Last 24 hours) at 03/16/17 1432  Last data filed at 03/16/17 1015   Gross per 24 hour   Intake              640 ml   Output               675 ml   Net              -35 ml      Physical Exam   Constitutional: He is oriented to person, place, and time. He appears well-developed. No distress.   Neck: No JVD present.   Cardiovascular: Regular rhythm, normal heart sounds and intact distal pulses.  Tachycardia present.    No murmur heard.  Pulmonary/Chest: Effort normal. He has decreased breath sounds in the right middle field and the right lower field.   Musculoskeletal: He exhibits no edema.   Neurological: He is alert and oriented to person, place, and time.   Skin: Skin is warm and dry.   Sternal incision approximated and healing.       Significant Labs:   Blood Culture: No results for input(s): LABBLOO in the last 48 hours.  BMP:   Recent Labs  Lab 03/15/17  0429 03/16/17  0208   GLU 78 113*   * 135*   K 4.4 4.5   CL 99 102   CO2 22* 23   BUN 49* 47*   CREATININE 5.8* 5.7*   CALCIUM 8.6* 8.6*   MG 2.5  --      CBC:   Recent Labs  Lab 03/15/17  0429 03/16/17  0208   WBC 8.73 8.19   HGB 8.2* 7.3*   HCT 24.6* 22.7*    200     CMP:   Recent Labs  Lab 03/15/17  0429 03/16/17  0208   * 135*   K 4.4 4.5   CL 99 102   CO2 22* 23   GLU 78 113*   BUN 49* 47*   CREATININE 5.8* 5.7*   CALCIUM 8.6* 8.6*   ALBUMIN 1.9* 2.0*   ANIONGAP 12 10   EGFRNONAA 10.7* 11.0*     Urine Culture:   Recent Labs  Lab 03/14/17  1716   LABURIN No growth     All pertinent labs within the past 24 hours have been reviewed.    Significant Imaging: I have reviewed all pertinent imaging results/findings within the past 24 hours.    Assessment/Plan:      * Acute combined systolic and diastolic heart failure  -New onset systolic heart failure along with long standing PulmHTN from congential AS, now with volume overload as RAP are 15, new onset BIV failure  -2D echo: EF 30-35% concentric hypertrophy,  increased density to the myocardium, suggesting a myocardial infiltrative process (clinical correlation required), right atrial enlargement, right ventricular  hypertrophy, moderately to severely depressed right ventricular systolic function, pulmonary hypertension, estimated PA systolic pressure is 53 mmHg.   -tolerating hydralazine 100 mg TID, lisinopril 20 mg daily, and  metoprolol succinate 200 mg daily, will add isosorbide mononitrate 30 mg daily. Discontinue amlodipine.   -sildenafil discontinued, PAP's have improved since AVR  -dialyzed 3/14 with 3 L fluid removal, dialyzed with 850 mL removal 3/15--treatment discontinued early due to SVT episode  -continue lasix 80 mg IVP BID per nephrology recommendations, discuss tomorrow regarding wean to PO in anticipation of discharge  -still makes some urine, Nephrology following, I&O/weight noted    SVT (supraventricular tachycardia)  -tachycardia maintaining at 100-120 today, tolerating hydralazine 100 mg TID, lisinopril 20 mg daily, and  metoprolol succinate 200 mg daily, will add isosorbide mononitrate 30 mg daily. Discontinue amlodipine.   -RFA (3/13) with findings of EPS with AT noted; aberrancy with RBBB (clinical) also noted, activation mapping with earliest signal near the lateral tricuspid valve - with RF tachycardia terminated and no longer inducible  -SVT likely multifactorial: questionably anemia induced, s/p 3 units of PRBC's (Iron/TIBC studies indicative of ACD) versus irritability from permacath resting in right atrium versus new onset HF  -SVT episode in HD this 3/15, resolved spontaneously, EKG without changes  -continue to monitor tele  -history of SVT per EKG from OSH, in sinus tach on arrival to C    S/P AVR (aortic valve replacement)  -treating with colchicine 0.3 mg weekly for 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   -restart warfarin tonight 7.5 mg,  Goal INR 1.5-2 (AVR 2/8/2017)  -recent h/o INR 5, + bleeding from  nose and mouth, s/p vitamin K given at OSH    ESRD (end stage renal disease)  - dialysis 3/14 with 3 liter fluid removal and 3/15 with 850 mL removal, early termination of HD s/t SVT  -potassium 4.5 today  -lasix 80 mg IVP BID-per nephrology recommendations, will discuss transitioning to PO or discontinuation tomorrow in anticipation of nearing discharge  - nephrology following  -labs in AM    Anemia of chronic disease  -receiving epoetin 10,000 units MWF  -h/h remains at baseline today, will continue to monitor  -iron panel appears to be ACD    Uncontrolled hypertension  -BP improved, see above medication regimen  -weaned off home clonidine    Pleural effusion  -IS/flutter, cough deep breath/ heart pillow given, encouraged oob  -continue to monitor with diuresis and dialysis, no distress or shortness of breath   -pulmonology performed thoracentesis (3/10) with 1500ml serosang/ sang tap, specimen not received in lab post tap  -CXR 3/14 with small amount of pleural effusion noted on right, left lung clear    Pulmonary HTN  -sildenafil discontinued 3/14 per cardiology recommendations  -2D echo PaP 53, down from 110 pre-procedure    Severe protein-calorie malnutrition  -nutritional referral and dietary consult education, needs teaching regarding renal diet and coumadin diet as well as appropriate fluid restriction of 1500 mL/day-discussed diet with patient questions answered  -pre albumin 13 and albumin 2  -nepro renal supplements ordered and encouraged patient to consume  -PT evaluation for deconditioning and loss of muscle mass, will attempt to arrange outpt PT    Adult congenital heart disease  - ASD closure at age 8  - Nonrheumatic aortic valve stenosis s/p AVR 2/2017    VTE Risk Mitigation         Ordered     warfarin (COUMADIN) tablet 7.5 mg  Daily     Route:  Oral        03/15/17 1326        Katrin Calles PA-C  Department of Hospital Medicine   [facility]

## 2017-03-16 NOTE — PROGRESS NOTES
Report given to MEG Rizo from day surgery. Pt is going to have fistula placement this am. Pt notified to remove everything except gown. Will continue to monitor.

## 2017-03-16 NOTE — ANESTHESIA PROCEDURE NOTES
Supraclavicular Nerve Single Shot Block    Patient location during procedure: OR    Reason for block: primary anesthetic   Diagnosis: renal failure   Start time: 3/16/2017 7:45 AM  Timeout: 3/16/2017 7:43 AM   End time: 3/16/2017 7:50 AM  Staffing  Anesthesiologist: JESÚS ANTONY  Resident/CRNA: JESUSITA LEI  Other anesthesia staff: TWILA IZQUIERDO  Performed by: resident/CRNA   Preanesthetic Checklist  Completed: patient identified, site marked, surgical consent, pre-op evaluation, timeout performed, IV checked, risks and benefits discussed and monitors and equipment checked  Peripheral Block  Patient position: supine  Prep: ChloraPrep  Patient monitoring: heart rate, cardiac monitor, continuous pulse ox, continuous capnometry and frequent blood pressure checks  Block type: supraclavicular  Laterality: left  Injection technique: single shot  Needle  Needle type: Stimuplex   Needle gauge: 22 G  Needle length: 2 in  Needle localization: anatomical landmarks and ultrasound guidance   -ultrasound image captured on disc.  Assessment  Injection assessment: negative aspiration, negative parasthesia and local visualized surrounding nerve  Paresthesia pain: none  Heart rate change: no  Slow fractionated injection: yes  Medications:  Bolus administered: 30 mL of 1.5 mepivacaine  Epinephrine added: 3.75 mcg/mL (1/300,000)  Additional Notes  Intercostal brachial field block performed for additional coverage.  VSS, see anesthesia record.  Patient tolerated procedure well.

## 2017-03-16 NOTE — ASSESSMENT & PLAN NOTE
-New onset systolic heart failure along with long standing PulmHTN from congential AS, now with volume overload as RAP are 15, new onset BIV failure  -2D echo: EF 30-35% concentric hypertrophy,  increased density to the myocardium, suggesting a myocardial infiltrative process (clinical correlation required), right atrial enlargement, right ventricular hypertrophy, moderately to severely depressed right ventricular systolic function, pulmonary hypertension, estimated PA systolic pressure is 53 mmHg.   -tolerating hydralazine 100 mg TID, lisinopril 20 mg daily, and  metoprolol succinate 200 mg daily, will add isosorbide mononitrate 30 mg daily. Discontinue amlodipine.   -sildenafil discontinued, PAP's have improved since AVR  -dialyzed 3/14 with 3 L fluid removal, dialyzed with 850 mL removal 3/15--treatment discontinued early due to SVT episode  -continue lasix 80 mg IVP BID per nephrology recommendations, discuss tomorrow regarding wean to PO in anticipation of discharge  -still makes some urine, Nephrology following, I&O/weight noted

## 2017-03-16 NOTE — TRANSFER OF CARE
"Anesthesia Transfer of Care Note    Patient: Isidro Lozano Jr White    Procedure(s) Performed: Procedure(s) (LRB):  [id number] Accuseal (Left)    Patient location: PACU    Anesthesia Type: general    Transport from OR: Transported from OR on room air with adequate spontaneous ventilation    Post pain: adequate analgesia    Post assessment: no apparent anesthetic complications    Post vital signs: stable    Level of consciousness: awake    Nausea/Vomiting: no nausea/vomiting    Complications: none          Last vitals:   Visit Vitals    BP (!) 172/93 (BP Location: Left arm, Patient Position: Lying, BP Method: Automatic)    Pulse (!) 115    Temp 36.7 °C (98 °F) (Oral)    Resp (!) 22    Ht 6' 2" (1.88 m)    Wt 74 kg (163 lb 2.3 oz)    SpO2 (!) 94%    BMI 20.95 kg/m2     "

## 2017-03-16 NOTE — PROGRESS NOTES
Pt's brother, Maykel Whiet, notified pt in recovery room, updated on pt status & POC. Will continue to monitor.

## 2017-03-16 NOTE — BRIEF OP NOTE
Brief Operative Note  Date: 03/16/2017    Surgeon(s) and Role:     * Alcides Manning MD - Primary     * Dana Ribeiro MD - Resident - Assisting    Pre-op Diagnosis:  ESRD (end stage renal disease) [N18.6]; AV access need for acute HD    Post-op Diagnosis:  Same    Procedure(s):  Creation of LUE AVG / [place] Accuseal 4/7mm tapered early access graft    Surgeon: Alcides Manning MD FACS    Anesthesia: Regional    Findings/Key Components:  Strong thrill in LUE AVG  2+ L radial pulse  Surgery done while patient on therapeutic heparin gtt due to AVR; intra-op  > secs    EBL: Minimal      Specimens     None        I attest to being present for the procedure and performing the case.  Alcides Manning MD FACS

## 2017-03-16 NOTE — PROGRESS NOTES
Cardiology Progress Note    Subjective:     Interval History: No change from yesterday, antibody labs still pending. S/p colchicine earlier this week.      Meds:     Scheduled Meds:   [START ON 3/21/2017] colchicine  0.3 mg Oral Q7 Days    furosemide  80 mg Intravenous BID    hydrALAZINE  100 mg Oral Q8H    isosorbide mononitrate  30 mg Oral Daily    lisinopril  20 mg Oral Daily    metoprolol succinate  200 mg Oral Daily    pantoprazole  40 mg Oral Daily    senna-docusate 8.6-50 mg  1 tablet Oral BID    sevelamer carbonate  800 mg Oral TID WM    sodium chloride 0.9%  3 mL Intravenous Q8H    warfarin  7.5 mg Oral Daily     PRN Meds:sodium chloride 0.9%, acetaminophen, glycerin adult, heparin (porcine), heparin (PORCINE), ondansetron, ondansetron, oxycodone, polyethylene glycol, sodium chloride 0.9%, sodium chloride 0.9%, tramadol  Continuous Infusions:       Physical Exam:     Vitals:  Temp:  [98 °F (36.7 °C)-99.6 °F (37.6 °C)]   Pulse:  [100-126]   Resp:  [8-22]   BP: (109-172)/(75-97)   SpO2:  [92 %-100 %]   I/O's:    Intake/Output Summary (Last 24 hours) at 03/16/17 1405  Last data filed at 03/16/17 1015   Gross per 24 hour   Intake              640 ml   Output              675 ml   Net              -35 ml        Constitutional:  NAD, conversant  HEENT:   Sclera anicteric, PERRLA, EOMI, OP clear  Neck:   JVD 2-3cm  CV:   Tachycardic, metalic heart sound heart  Pulm:   Decreased right sided breath sounds, mildly tachypneic  GI:   Abdomen mildly distended  Extremities:  No LE edema, warm with palpable pulses  Skin:   No ecchymosis, erythema, or ulcers  Neuro:   AAOX3, no focal motor deficits    Labs:     Recent Results (from the past 336 hour(s))   CBC auto differential    Collection Time: 03/16/17  2:08 AM   Result Value Ref Range    WBC 8.19 3.90 - 12.70 K/uL    Hemoglobin 7.3 (L) 14.0 - 18.0 g/dL    Hematocrit 22.7 (L) 40.0 - 54.0 %    Platelets 200 150 - 350 K/uL   CBC auto differential     Collection Time: 03/15/17  4:29 AM   Result Value Ref Range    WBC 8.73 3.90 - 12.70 K/uL    Hemoglobin 8.2 (L) 14.0 - 18.0 g/dL    Hematocrit 24.6 (L) 40.0 - 54.0 %    Platelets 218 150 - 350 K/uL   CBC auto differential    Collection Time: 03/14/17  5:03 AM   Result Value Ref Range    WBC 8.70 3.90 - 12.70 K/uL    Hemoglobin 8.3 (L) 14.0 - 18.0 g/dL    Hematocrit 25.0 (L) 40.0 - 54.0 %    Platelets 222 150 - 350 K/uL       Recent Results (from the past 336 hour(s))   Basic metabolic panel     Collection Time: 03/10/17  4:51 AM   Result Value Ref Range    Sodium 136 136 - 145 mmol/L    Potassium 3.5 3.5 - 5.1 mmol/L    Chloride 99 95 - 110 mmol/L    CO2 28 23 - 29 mmol/L    BUN, Bld 34 (H) 6 - 20 mg/dL    Creatinine 5.3 (H) 0.5 - 1.4 mg/dL    Calcium 8.5 (L) 8.7 - 10.5 mg/dL    Anion Gap 9 8 - 16 mmol/L     No results for input(s): TROPONINI, CPKMB, CPK, MB in the last 168 hours.  Estimated Creatinine Clearance: 16.9 mL/min (based on Cr of 5.7).    Imaging: Echo 3/10/16  1 - Upper limit of normal left ventricular enlargement.     2 - Moderately depressed left ventricular systolic function (EF 30-35%).     3 - Concentric hypertrophy.     4 - There is an increased density to the myocardium, suggesting a myocardial infiltrative process (clinical correlation required).     5 - Right atrial enlargement.     6 - Right ventricular hypertrophy.     7 - Moderately to severely depressed right ventricular systolic function .     8 - Aortic valve prosthesis, effective prosthetic valve area corrected for BSA is 1.07 cm2.     9 - Trivial to mild aortic regurgitation.     10 - Mild to moderate tricuspid regurgitation.     11 - Trivial pericardial effusion with thickened pericardium .     12 - Increased central venous pressure.     13 - Atrial septal aneurysm .     14 - Left pleural effusion.     15 - Pulmonary hypertension. The estimated PA systolic pressure is 53 mmHg.     Telemetry:  Telemetry currently shows: Sinus  tachycardia, PACs    Assessment & Plan:     47 year old male with recent aortic valve replacement (2/8/17, 22 mm mechanical, indication: mod/severe aortic stenosis and regurgitation), tricuspid valve repair (28 mm annuloplasty), redo sternotomy - possible ASD closure as a child, ESRD on HD since 1/30/2017, pulmonary HTN, HTN. The patient was seen with the following diagnoses:      1) HFrEF / ADHF / BiV Failure  - LVEF dropped to 33% from 60% compared to his pre-surgery Echo.  [facility] - Recheck Echo prior to discharge.  - Sinus tach 2/2 CHF vs. Inflammatory reaction - cont to monitor  - Continue hydralazine, lisinopril, ok to change norvasc to ISMN for heart failure regimen.  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  ESR elevated.  Low grade temperature.  - follow up SALIMA, anti-dsDNA, anti-histone.  - BC NGTD  - Cont colchicine 0.3 qwk    2) S/P AVR and Tricuspid valve repair  - Surgical incision healing well.  - Anticoagulated with warfarin. Labile INR.   - Ok for NO bridging    3) Anemia  - Normocytic.   - Likely multifactorial: Blood loss, ESRD. Iron panel does not show iron deficiency.  - Should receive EPO with HD.  - homocysteine normla    4) Elevated Troponin  - Likely due to tachycardia in the setting of recent heart surgery and ESRD.  - No anginal chest pain, no new ischemic changes in ECG.  - ACS is unlikely with these findings.    5) Wide Complex Tachycardia  - AT with RBBB s/p ablation  with EP.    6) ESRD  - Vascular surgery to address access issues    7) HTN  - as above recommend holding hydralazine for permissive hypertension during HD.    Attending Addendum:

## 2017-03-16 NOTE — ASSESSMENT & PLAN NOTE
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   -restart warfarin tonight 7.5 mg,  Goal INR 1.5-2 (AVR 2/8/2017)  -recent h/o INR 5, + bleeding from nose and mouth, s/p vitamin K given at OSH

## 2017-03-16 NOTE — SUBJECTIVE & OBJECTIVE
Interval History: Sitting on side of bed, no acute events or distress overnight. C/O 1/10 pain at new graft site. No chest pain, shortness of breath, or palpitations.     Review of Systems   Constitutional: Positive for fatigue. Negative for activity change, chills and fever.   Respiratory: Negative for cough, chest tightness and shortness of breath.    Cardiovascular: Negative for chest pain, palpitations and leg swelling.   Gastrointestinal: Positive for constipation. Negative for abdominal pain and nausea.   Neurological: Negative for dizziness, light-headedness and headaches.     Objective:     Vital Signs (Most Recent):  Temp: 98.6 °F (37 °C) (03/16/17 1300)  Pulse: (!) 120 (03/16/17 1300)  Resp: 16 (03/16/17 1300)  BP: 109/75 (03/16/17 1300)  SpO2: 99 % (03/16/17 1300) Vital Signs (24h Range):  Temp:  [98 °F (36.7 °C)-99.6 °F (37.6 °C)] 98.6 °F (37 °C)  Pulse:  [100-126] 120  Resp:  [8-22] 16  SpO2:  [92 %-100 %] 99 %  BP: (109-172)/(75-97) 109/75     Weight: 74 kg (163 lb 2.3 oz)  Body mass index is 20.95 kg/(m^2).    Intake/Output Summary (Last 24 hours) at 03/16/17 1432  Last data filed at 03/16/17 1015   Gross per 24 hour   Intake              640 ml   Output              675 ml   Net              -35 ml      Physical Exam   Constitutional: He is oriented to person, place, and time. He appears well-developed. No distress.   Neck: No JVD present.   Cardiovascular: Regular rhythm, normal heart sounds and intact distal pulses.  Tachycardia present.    No murmur heard.  Pulmonary/Chest: Effort normal. He has decreased breath sounds in the right middle field and the right lower field.   Musculoskeletal: He exhibits no edema.   Neurological: He is alert and oriented to person, place, and time.   Skin: Skin is warm and dry.   Sternal incision approximated and healing.       Significant Labs:   Blood Culture: No results for input(s): LABBLOO in the last 48 hours.  BMP:   Recent Labs  Lab 03/15/17  4386  03/16/17  0208   GLU 78 113*   * 135*   K 4.4 4.5   CL 99 102   CO2 22* 23   BUN 49* 47*   CREATININE 5.8* 5.7*   CALCIUM 8.6* 8.6*   MG 2.5  --      CBC:   Recent Labs  Lab 03/15/17  0429 03/16/17  0208   WBC 8.73 8.19   HGB 8.2* 7.3*   HCT 24.6* 22.7*    200     CMP:   Recent Labs  Lab 03/15/17  0429 03/16/17  0208   * 135*   K 4.4 4.5   CL 99 102   CO2 22* 23   GLU 78 113*   BUN 49* 47*   CREATININE 5.8* 5.7*   CALCIUM 8.6* 8.6*   ALBUMIN 1.9* 2.0*   ANIONGAP 12 10   EGFRNONAA 10.7* 11.0*     Urine Culture:   Recent Labs  Lab 03/14/17  1716   LABURIN No growth     All pertinent labs within the past 24 hours have been reviewed.    Significant Imaging: I have reviewed all pertinent imaging results/findings within the past 24 hours.

## 2017-03-16 NOTE — PLAN OF CARE
Pt arrived via w/c a/a nad noted or reported. Pt gave informed consent and voiced intended procedure. Pt gave two verbal patient identifiers, allergies, NPO status and reported meds taken this am. Pt with dressing to right cw from rosalina catheter removal. cdi

## 2017-03-16 NOTE — PLAN OF CARE
Problem: Patient Care Overview  Goal: Plan of Care Review  Outcome: Ongoing (interventions implemented as appropriate)  Pt denies Chest pain, SOB or nausea. Fall risk reviewed with pt. No falls, trauma or injury noted. VSS. Dialysis pt; Mon- Wed- Fri. Heparin infusing. NPO since midnight. Plan is to have fistula this am. Plan of care reviewed with patient. No further questions at this time. No significant events. Will continue to monitor.

## 2017-03-16 NOTE — ANESTHESIA POSTPROCEDURE EVALUATION
"Anesthesia Post Evaluation    Patient: Isidro White    Procedure(s) Performed: Procedure(s) (LRB):  [id number] Accuseal (Left)    Final Anesthesia Type: general  Patient location during evaluation: PACU  Patient participation: Yes- Able to Participate  Level of consciousness: awake and alert and oriented  Post-procedure vital signs: reviewed and stable  Pain management: adequate  Airway patency: patent  PONV status at discharge: No PONV  Anesthetic complications: no      Cardiovascular status: blood pressure returned to baseline and tachycardic  Respiratory status: unassisted and room air  Hydration status: euvolemic  Follow-up not needed.        Visit Vitals    BP (!) 149/78    Pulse (!) 115    Temp 36.9 °C (98.5 °F) (Oral)    Resp 11    Ht 6' 2" (1.88 m)    Wt 74 kg (163 lb 2.3 oz)    SpO2 100%    BMI 20.95 kg/m2       Pain/Dru Score: Pain Assessment Performed: Yes (3/16/2017 12:00 PM)  Presence of Pain: complains of pain/discomfort (3/16/2017 12:00 PM)  Pain Rating Prior to Med Admin: 7 (3/16/2017 12:05 PM)  Pain Rating Post Med Admin: 3 (3/15/2017 11:20 PM)  Dru Score: 10 (3/16/2017 12:00 PM)      "

## 2017-03-16 NOTE — ASSESSMENT & PLAN NOTE
- dialysis 3/14 with 3 liter fluid removal and 3/15 with 850 mL removal, early termination of HD s/t SVT  -potassium 4.5 today  -lasix 80 mg IVP BID-per nephrology recommendations, will discuss transitioning to PO or discontinuation tomorrow in anticipation of nearing discharge  - nephrology following  -labs in AM

## 2017-03-16 NOTE — PLAN OF CARE
Problem: Patient Care Overview  Goal: Plan of Care Review  Outcome: Ongoing (interventions implemented as appropriate)  Patient free of falls/trauma/injuries. Patient with heart failure being diuresed with lasix IVP. Left arm fistula placed for dialysis. Left arm elevated. Pain controlled with PRN oxy. Denies any SOB or chest pain. General skin remains intact.

## 2017-03-17 LAB
ALBUMIN SERPL BCP-MCNC: 2 G/DL
ANION GAP SERPL CALC-SCNC: 11 MMOL/L
BUN SERPL-MCNC: 63 MG/DL
CALCIUM SERPL-MCNC: 8.7 MG/DL
CHLORIDE SERPL-SCNC: 101 MMOL/L
CO2 SERPL-SCNC: 21 MMOL/L
CREAT SERPL-MCNC: 6.8 MG/DL
EST. GFR  (AFRICAN AMERICAN): 10.2 ML/MIN/1.73 M^2
EST. GFR  (NON AFRICAN AMERICAN): 8.9 ML/MIN/1.73 M^2
GLUCOSE SERPL-MCNC: 92 MG/DL
INR PPP: 1.7
PHOSPHATE SERPL-MCNC: 5.3 MG/DL
POTASSIUM SERPL-SCNC: 5 MMOL/L
PROTHROMBIN TIME: 17.5 SEC
SODIUM SERPL-SCNC: 133 MMOL/L

## 2017-03-17 PROCEDURE — 25000003 PHARM REV CODE 250: Performed by: NURSE PRACTITIONER

## 2017-03-17 PROCEDURE — 25000003 PHARM REV CODE 250: Performed by: PHYSICIAN ASSISTANT

## 2017-03-17 PROCEDURE — 90935 HEMODIALYSIS ONE EVALUATION: CPT | Mod: 58,,, | Performed by: INTERNAL MEDICINE

## 2017-03-17 PROCEDURE — 80100016 HC MAINTENANCE HEMODIALYSIS

## 2017-03-17 PROCEDURE — 63600175 PHARM REV CODE 636 W HCPCS: Performed by: INTERNAL MEDICINE

## 2017-03-17 PROCEDURE — 20600001 HC STEP DOWN PRIVATE ROOM

## 2017-03-17 PROCEDURE — 25000003 PHARM REV CODE 250: Performed by: EMERGENCY MEDICINE

## 2017-03-17 PROCEDURE — 63600175 PHARM REV CODE 636 W HCPCS: Performed by: NURSE PRACTITIONER

## 2017-03-17 PROCEDURE — 27000207 HC ISOLATION

## 2017-03-17 PROCEDURE — 80069 RENAL FUNCTION PANEL: CPT

## 2017-03-17 PROCEDURE — 96372 THER/PROPH/DIAG INJ SC/IM: CPT

## 2017-03-17 PROCEDURE — 99233 SBSQ HOSP IP/OBS HIGH 50: CPT | Mod: ,,, | Performed by: NURSE PRACTITIONER

## 2017-03-17 PROCEDURE — 85610 PROTHROMBIN TIME: CPT

## 2017-03-17 PROCEDURE — 25000003 PHARM REV CODE 250: Performed by: INTERNAL MEDICINE

## 2017-03-17 PROCEDURE — 36415 COLL VENOUS BLD VENIPUNCTURE: CPT

## 2017-03-17 RX ORDER — METOPROLOL SUCCINATE 25 MG/1
50 TABLET, EXTENDED RELEASE ORAL ONCE
Status: COMPLETED | OUTPATIENT
Start: 2017-03-17 | End: 2017-03-17

## 2017-03-17 RX ORDER — HYDROMORPHONE HYDROCHLORIDE 2 MG/1
2 TABLET ORAL EVERY 4 HOURS PRN
Status: DISCONTINUED | OUTPATIENT
Start: 2017-03-17 | End: 2017-03-19 | Stop reason: HOSPADM

## 2017-03-17 RX ORDER — SODIUM CHLORIDE 9 MG/ML
INJECTION, SOLUTION INTRAVENOUS ONCE
Status: COMPLETED | OUTPATIENT
Start: 2017-03-17 | End: 2017-03-17

## 2017-03-17 RX ORDER — MORPHINE SULFATE 2 MG/ML
2 INJECTION, SOLUTION INTRAMUSCULAR; INTRAVENOUS ONCE
Status: COMPLETED | OUTPATIENT
Start: 2017-03-17 | End: 2017-03-17

## 2017-03-17 RX ORDER — SODIUM CHLORIDE 9 MG/ML
INJECTION, SOLUTION INTRAVENOUS
Status: DISCONTINUED | OUTPATIENT
Start: 2017-03-17 | End: 2017-03-19 | Stop reason: HOSPADM

## 2017-03-17 RX ADMIN — Medication 3 ML: at 09:03

## 2017-03-17 RX ADMIN — OXYCODONE HYDROCHLORIDE 10 MG: 5 TABLET ORAL at 09:03

## 2017-03-17 RX ADMIN — METOPROLOL SUCCINATE 50 MG: 25 TABLET, EXTENDED RELEASE ORAL at 09:03

## 2017-03-17 RX ADMIN — FUROSEMIDE 80 MG: 10 INJECTION, SOLUTION INTRAMUSCULAR; INTRAVENOUS at 05:03

## 2017-03-17 RX ADMIN — ISOSORBIDE MONONITRATE 30 MG: 30 TABLET, EXTENDED RELEASE ORAL at 09:03

## 2017-03-17 RX ADMIN — Medication 3 ML: at 05:03

## 2017-03-17 RX ADMIN — METOPROLOL SUCCINATE 200 MG: 100 TABLET, FILM COATED, EXTENDED RELEASE ORAL at 09:03

## 2017-03-17 RX ADMIN — OXYCODONE HYDROCHLORIDE 10 MG: 5 TABLET ORAL at 12:03

## 2017-03-17 RX ADMIN — TRAMADOL HYDROCHLORIDE 100 MG: 50 TABLET, FILM COATED ORAL at 03:03

## 2017-03-17 RX ADMIN — STANDARDIZED SENNA CONCENTRATE AND DOCUSATE SODIUM 1 TABLET: 8.6; 5 TABLET, FILM COATED ORAL at 09:03

## 2017-03-17 RX ADMIN — HYDRALAZINE HYDROCHLORIDE 100 MG: 50 TABLET ORAL at 05:03

## 2017-03-17 RX ADMIN — OXYCODONE HYDROCHLORIDE 10 MG: 5 TABLET ORAL at 05:03

## 2017-03-17 RX ADMIN — SEVELAMER CARBONATE 800 MG: 800 TABLET, FILM COATED ORAL at 05:03

## 2017-03-17 RX ADMIN — FUROSEMIDE 80 MG: 10 INJECTION, SOLUTION INTRAMUSCULAR; INTRAVENOUS at 09:03

## 2017-03-17 RX ADMIN — MORPHINE SULFATE 2 MG: 2 INJECTION, SOLUTION INTRAMUSCULAR; INTRAVENOUS at 05:03

## 2017-03-17 RX ADMIN — LISINOPRIL 20 MG: 20 TABLET ORAL at 09:03

## 2017-03-17 RX ADMIN — ERYTHROPOIETIN 10000 UNITS: 10000 INJECTION, SOLUTION INTRAVENOUS; SUBCUTANEOUS at 12:03

## 2017-03-17 RX ADMIN — WARFARIN SODIUM 7.5 MG: 7.5 TABLET ORAL at 05:03

## 2017-03-17 RX ADMIN — HYDRALAZINE HYDROCHLORIDE 100 MG: 50 TABLET ORAL at 09:03

## 2017-03-17 RX ADMIN — SODIUM CHLORIDE: 0.9 INJECTION, SOLUTION INTRAVENOUS at 11:03

## 2017-03-17 RX ADMIN — PANTOPRAZOLE SODIUM 40 MG: 40 TABLET, DELAYED RELEASE ORAL at 09:03

## 2017-03-17 RX ADMIN — SEVELAMER CARBONATE 800 MG: 800 TABLET, FILM COATED ORAL at 09:03

## 2017-03-17 NOTE — PROGRESS NOTES
HD maintenance Isolation treatment initiated via Left upper arm graft using 17 g needles for first cannulation, lines secured

## 2017-03-17 NOTE — PROGRESS NOTES
"[facility] Hospital Medicine  Progress Note    Patient Name: Isidro White  MRN: [id number]  Patient Class: IP- Inpatient   Admission Date: 3/9/2017  Length of Stay: 8 days  Attending Physician: Penelope Davis MD  Primary Care Provider: Provider [facility] Medicine Team: [facility]OSP MED J Stacy Flynn NP    Subjective:     Principal Problem:Acute combined systolic and diastolic heart failure    HPI:  Mr. White is a 48 yo AAM who has PMH of AV replacement and TV repair on February 8, 2017, ASD closure at age 8, ESRD on HD M/W/F, pulmonary hypertension, and HTN. He presented to the ED from [facility] after being seen for SVT and given verapamil. On arrival to the ED he is noted to be in sinus tach around 105-110. He reports a similar episode earlier in the week 3/7/2017 where he was seen at [facility] for what he describes as a fast then slowing then fast then slowing heart rate, he was treated with coreg and sent home. On day of admit around 0200/0300 he was awoken by the same fast then slowing then fast again heart rate, he asked for assistance from his brother and they checked his blood pressure and heart rate which they report as being heart rate 179, and . Thus prompting his visit to the hospital. He denies stabbing chest pain, but does however report chest tightness and shortness of breath. He denies any radiation of the midsternal chest tightness. He denies headache, cough, fever, chills, night sweats, N/V/D. He reports strict adherence to renal diet and "32 oz" fluid restriction. He denies any caffeine intake or energy drink use. He denies anxiety in the past, but endorses stress over the last month with all his medical issues.     He also reports that on 3/4/17 he started to have bleeding from his mouth, nose, and ears. He was seen again at [facility] and given 10 mL of vitamin K for an INR level of 5, per brother. He has had no further " bleeding and denies tarry stools or blood in stools. As an ESRD patient he was dialyzed per his schedule on Wednesday 3/8/2017. He has a vas-cath in the right upper chest and denies bleeding from this site. He reports urinating 2-3 times per day yellow urine, denies hematuria.     He is being admitted to the Hospital Medicine Service for further evaluation.       Hospital Course:  Admitted to Hospital Medicine Service management, for acute SVT and acute anemia, he was transfused two units of blood, and was placed on heparin gtt for sub therapeutic INR of 1.7 in setting of recent mechanical AVR on 2/8/2017 (goal INR 2.0-3.0). He had significant bilateral pleural effusion's and a recent INR of 5, and trouble breathing.  We consulted pulmonary who initially stated no thoracentesis as they felt it was from heart failure and to diurese with dialysis and IV diuresis. Dialysis did not appear to decrease effusion size, pulmonology performed thoracentesis (3/10) with 1500ml of serosang to sang fluid removal. Pulm appears to have sent fluid analysis however there were no specimens received in lab. He then had a drop in h/h and there was concern for rebleeding into the chest as he was noted to be hypotensive with dialysis (which necessitated albumin infusion) and he was transfused another unit of PRBC's. His breath sounds have improved and repeat CXR shows some improvement with continued RLL small amount of pleural effusion. He also has had improvement in shortness of breath and pleuritic pain. His sternal chest pain at incisional site is controlled with current pain med regimen.      As for his SVT, EP performed RFA (3/13) with findings of EPS with AT noted; aberrancy with RBBB (clinical) also noted, activation mapping with earliest signal near the lateral tricuspid valve - with RF tachycardia terminated and no longer inducible. Had episode of  during HD 3/15, resolved spontaneously, EKG obtained with no changes.  His  BP has improved over hospital course, heart rate remains tachycardiac in the 100-120 range, possible causes heart failure/ acute anemia/ volume overload. His HR and BP are being aggressively treated with hydralazine 100 mg TID, lisinopril 20 mg daily, isosorbide mononitrate 30 mg, and metoprolol succinate 250 mg daily. Sildenafil was discontinued 3/14. Blood cultures and urine culture with NGTD. 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 SALIMA negative, anti-dsDNA, anti-histone to r/o drug induced lupus pending. Tunneled HD cath removed evening of 3/15 as he had another episode of SVT on dialysis and permacath was noted to be in the R atrium.Vascular surgery was consulted and  LUE AVG / Central Accuseal 4/7mm tapered early access graft placed 3/16 by vascular. He was dialyzed successfully with the new graft on 3/17.      Interval History: Resting in bed, no acute events or distress overnight. Remain tachycardiac, denies chest pain or palpitations. Endorses being tired and not getting to sleep for long periods, and that he is ready to go home.     Review of Systems   Constitutional: Negative for chills, diaphoresis and fever.   Respiratory: Negative for cough, chest tightness and shortness of breath.    Cardiovascular: Negative for chest pain, palpitations and leg swelling.   Gastrointestinal: Negative for abdominal pain, constipation, nausea and vomiting.   Neurological: Negative for dizziness, light-headedness and headaches.     Objective:     Vital Signs (Most Recent):  Temp: 97.6 °F (36.4 °C) (03/17/17 1139)  Pulse: (!) (P) 115 (03/17/17 1345)  Resp: (P) 17 (03/17/17 1345)  BP: (P) 107/71 (03/17/17 1345)  SpO2: 98 % (03/17/17 0800) Vital Signs (24h Range):  Temp:  [97.6 °F (36.4 °C)-99.6 °F (37.6 °C)] 97.6 °F (36.4 °C)  Pulse:  [101-126] (P) 115  Resp:  [16-20] (P) 17  SpO2:  [90 %-99 %] 98 %  BP: (109-135)/(70-86) (P) 107/71     Weight: 77.6 kg (171 lb 1.2 oz)  Body mass  index is 21.96 kg/(m^2).    Intake/Output Summary (Last 24 hours) at 03/17/17 1414  Last data filed at 03/17/17 0600   Gross per 24 hour   Intake                0 ml   Output              300 ml   Net             -300 ml      Physical Exam   Constitutional: He is oriented to person, place, and time. He appears well-developed. No distress.   Neck: No JVD present.   Cardiovascular: Regular rhythm, normal heart sounds and intact distal pulses.  Tachycardia present.    No murmur heard.  Pulmonary/Chest: Effort normal. No respiratory distress. He has decreased breath sounds in the right middle field and the right lower field.   Abdominal: Soft.   Musculoskeletal: He exhibits no edema.   Neurological: He is alert and oriented to person, place, and time.   Skin: Skin is warm and dry.       Significant Labs:   BMP:   Recent Labs  Lab 03/17/17  0403   GLU 92   *   K 5.0      CO2 21*   BUN 63*   CREATININE 6.8*   CALCIUM 8.7     CBC:   Recent Labs  Lab 03/16/17  0208   WBC 8.19   HGB 7.3*   HCT 22.7*        CMP:   Recent Labs  Lab 03/16/17  0208 03/17/17  0403   * 133*   K 4.5 5.0    101   CO2 23 21*   * 92   BUN 47* 63*   CREATININE 5.7* 6.8*   CALCIUM 8.6* 8.7   ALBUMIN 2.0* 2.0*   ANIONGAP 10 11   EGFRNONAA 11.0* 8.9*     All pertinent labs within the past 24 hours have been reviewed.    Significant Imaging: I have reviewed all pertinent imaging results/findings within the past 24 hours.    Assessment/Plan:      * Acute combined systolic and diastolic heart failure  -New onset systolic heart failure along with long standing PulmHTN from congential AS, RAP 15, new onset BIV failure  -2D echo: EF 30-35% concentric hypertrophy,  increased density to the myocardium, suggesting a myocardial infiltrative process (clinical correlation required), right atrial enlargement, right ventricular hypertrophy, moderately to severely depressed right ventricular systolic function, pulmonary  hypertension, estimated PA systolic pressure is 53 mmHg.   -tolerating hydralazine 100 mg TID, lisinopril 20 mg daily, metoprolol succinate 200 mg daily, and isosorbide mononitrate 30 mg daily. Discontinued amlodipine.   -sildenafil discontinued, PAP's have improved since AVR  -continued HD per nephrology  -continue lasix 80 mg IVP BID per nephrology recommendations, discuss tomorrow regarding wean to PO in anticipation of discharge  -still makes some urine, Nephrology following, I&O/weight noted    SVT (supraventricular tachycardia)  -tachycardia maintaining at 100-120, tolerating hydralazine 100 mg TID, lisinopril 20 mg daily, metoprolol succinate 200 mg daily, and isosorbide mononitrate 30 mg daily. Will give additional 50 mg metoprolol succinate today, if tolerates increase to 250 mg tomorrow.   -RFA (3/13) with findings of EPS with AT noted; aberrancy with RBBB (clinical) also noted, activation mapping with earliest signal near the lateral tricuspid valve - with RF tachycardia terminated and no longer inducible  -SVT likely multifactorial: questionably anemia induced, s/p 3 units of PRBC's (Iron/TIBC studies indicative of ACD) versus irritability from permacath resting in right atrium versus new onset HF  -SVT episode in HD 3/15, resolved spontaneously, EKG without changes, most likely d/t HD cath in R atrium, since removed and no more SVT episodes with dialysis  -continue to monitor tele  -history of SVT per EKG from OSH, in sinus tach on arrival to C, HR did respond to high dose labetalol, however not HF approved so switched to metoprolol    S/P AVR (aortic valve replacement)  -treating with colchicine 0.3 mg weekly for 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  - lasix 80 mg IVP BID-per nephrology recommendations, will discuss transitioning to PO or discontinuation tomorrow in anticipation of nearing discharge  - nephrology following  - accuseal graft placed in left arm yesterday by vascular surgery, RIJ permacath removed 3/15 by nephrology.  - will need to follow up with [facility]-4 weeks with [facility] US HD access    Anemia of chronic disease  - receiving epoetin 10,000 units MWF  - h/h remains at baseline today, will continue to monitor  - iron panel appears to be ACD    Uncontrolled hypertension  - BP improved, see above medication regimen  - weaned off home clonidine, and sildenafil    Pleural effusion  -IS/flutter, cough deep breath/ heart pillow given, encouraged oob  -continue to monitor with diuresis and dialysis, no distress or shortness of breath   -pulmonology performed thoracentesis (3/10) with 1500ml serosang/ sang tap, specimen not received in lab post tap  -CXR 3/14 with small amount of pleural effusion noted on right, left lung clear    Pulmonary HTN  -sildenafil discontinued 3/14 per cardiology recommendations  -2D echo PaP 53, down from 110 pre-procedure    Severe protein-calorie malnutrition  - nutritional referral and dietary consult education, needs teaching regarding renal diet and coumadin diet as well as appropriate fluid restriction of 1500 mL/day  - discussed diet with patient questions answered  - pre albumin 13 and albumin 2  - nepro renal supplements ordered and encouraged patient to consume  - PT evaluation for deconditioning and loss of muscle mass, wants outpatient PT at discharge    Adult congenital heart disease  - ASD closure at age 8  - Nonrheumatic aortic valve stenosis s/p AVR 2/2017    VTE Risk Mitigation         Ordered     warfarin (COUMADIN) tablet 7.5 mg  Daily     Route:  Oral        03/15/17 1326          Kika Bedolla  ALEX Knox  Department of Hospital Medicine   [facility]

## 2017-03-17 NOTE — PROGRESS NOTES
Progress Note  Nephrology    Admit Date: 3/9/2017   LOS: 8 days     SUBJECTIVE:     Follow-up For: ESRD    Interval follow up: NAEON. S/p acuseal AVG placement yesterday.    OBJECTIVE:     Vital Signs (Most Recent)  Temp: 97.6 °F (36.4 °C) (03/17/17 1551)  Pulse: (!) 114 (03/17/17 1551)  Resp: 18 (03/17/17 1551)  BP: 126/79 (03/17/17 1551)  SpO2: 98 % (03/17/17 0800)    Vital Signs Range (Last 24H):  Temp:  [97.6 °F (36.4 °C)-99.6 °F (37.6 °C)]   Pulse:  [101-126]   Resp:  [16-20]   BP: (106-135)/(66-87)   SpO2:  [90 %-99 %]       Date 03/17/17 0700 - 03/18/17 0659   Shift 4717-7069 8025-7103 1880-8006 24 Hour Total   I  N  T  A  K  E   P.O. 360   360    Other  700  700    Shift Total  (mL/kg) 360  (4.6) 700  (9)  1060  (13.7)   O  U  T  P  U  T   Urine  (mL/kg/hr) 125  (0.2)   125    Other  2700  2700    Shift Total  (mL/kg) 125  (1.6) 2700  (34.8)  2825  (36.4)   Weight (kg) 77.6 77.6 77.6 77.6     Physical Exam:   General: No acute distress, well groomed, alert and oriented x 3  HEENT: Normocephalic, atraumatic, EOM's intact bilaterally, external inspection of ears and nose normal, moist mucous membranes, no oral ulcerations/lesions  Neck: Supple, symmetrical, trachea midline, no thyromegaly, no JVD  Respiratory: Decreased breath sounds at bases. Mild crackles bilaterally.   Cardiovacular: Tachycardic, mechanical heart sound.Midline chest scar.   Gastrointestinal: Soft, non-tender, bowel sounds normal, no hepatosplenomegaly  Musculoskeletal: No knee or ankle joint tenderness or swelling.   Extremities: No clubbing or cyanosis of bilateral upper extremities; trace lower extremity edema bilaterally, radial pulses 2+ bilaterally, symmetric  Skin: warm and dry; no rash on exposed skin  LUE AVG; good thrill.    Laboratory:  CBC:     Recent Labs  Lab 03/16/17  0208   WBC 8.19   RBC 2.45*   HGB 7.3*   HCT 22.7*      MCV 93   MCH 29.8   MCHC 32.2     CMP:     Recent Labs  Lab 03/17/17  0403   GLU 92   CALCIUM 8.7    ALBUMIN 2.0*   *   K 5.0   CO2 21*      BUN 63*   CREATININE 6.8*       Diagnostic Results:  Labs: Reviewed  X-Ray: Reviewed    ASSESSMENT/PLAN:     Active Hospital Problems    Diagnosis  POA    *Acute combined systolic and diastolic heart failure [I50.41]  Yes     Non ischemic DCM      Anemia of chronic disease [D63.8]  Yes    SVT (supraventricular tachycardia) [I47.1]  No    Pleural effusion [J90]  Yes    Severe protein-calorie malnutrition [E43]  Yes    ESRD (end stage renal disease) [N18.6]  Yes    S/P AVR (aortic valve replacement) [Z95.2]  Not Applicable    Adult congenital heart disease [Q24.9]  Not Applicable     nonrheumatic aortic valve stenosis s/p AVR 2/2017      Pulmonary HTN [I27.2]  Yes    Uncontrolled hypertension [I10]  Yes      Resolved Hospital Problems    Diagnosis Date Resolved POA    Volume depletion [E86.9] 03/11/2017 Yes     A 47 y.o. male presents with history of recent aortic valve replacement (2/8/17, 22 mm mechanical, indication: mod/severe aortic stenosis and regurgitation), tricuspid valve repair (28 mm annuloplasty), redo sternotomy, possible ASD closure as a child, ESRD on HD, pulmonary HTN, HTN who comes as a transfer from [place] for SVT-.     ESRD on IHD MWF  [facility]  Duration of outpatient dialysis session - 4 hours  Residual Renal Function - Yes  - Will provide dialysis for metabolic clearance and volume management x 4 hours  -Aceess: AVG      Anemia of Chronic Kidney Disease   - Target Hgb: 10- 11.5  - Currently not at goal (8.2)   - Ferritin 1,201  - Will continue epoetin 10,000 units MWF     MBD  - Continue binders as taken outpatient      HTN   - Well control BP, will continue to monitor. Goal for BP is <130 mmHg SBP and BDP <80 mmHg      Patrick Ferrera   Nephrology fellow PGY- 5  [phone]       I have reviewed and concur with the fellow's history, physical, assessment, and plan. I have personally  interviewed and examined the patient at bedside.

## 2017-03-17 NOTE — PLAN OF CARE
Problem: Patient Care Overview  Goal: Plan of Care Review  POC reviewed with pt, pt able to verbalize understanding and acceptance, aaox4. No significant changes this shift, pt having c/o pain in left upper arm at new HD site. Prn Oxy IR and prn Tramadol alternated with minimal pain relief. VSS throughout shift, pt free from fall or injury, no skin breakdown noted. WC     Temp:  [98.9 °F (37.2 °C)-99.6 °F (37.6 °C)]   Pulse:  [101-126]   Resp:  [18-20]   BP: (130-135)/(77-86)   SpO2:  [90 %-99 %]

## 2017-03-17 NOTE — SUBJECTIVE & OBJECTIVE
Interval History: Resting in bed, no acute events or distress overnight. Remain tachycardiac, denies chest pain or palpitations. Endorses being tired and not getting to sleep for long periods, and that he is ready to go home.     Review of Systems   Constitutional: Negative for chills, diaphoresis and fever.   Respiratory: Negative for cough, chest tightness and shortness of breath.    Cardiovascular: Negative for chest pain, palpitations and leg swelling.   Gastrointestinal: Negative for abdominal pain, constipation, nausea and vomiting.   Neurological: Negative for dizziness, light-headedness and headaches.     Objective:     Vital Signs (Most Recent):  Temp: 97.6 °F (36.4 °C) (03/17/17 1139)  Pulse: (!) (P) 115 (03/17/17 1345)  Resp: (P) 17 (03/17/17 1345)  BP: (P) 107/71 (03/17/17 1345)  SpO2: 98 % (03/17/17 0800) Vital Signs (24h Range):  Temp:  [97.6 °F (36.4 °C)-99.6 °F (37.6 °C)] 97.6 °F (36.4 °C)  Pulse:  [101-126] (P) 115  Resp:  [16-20] (P) 17  SpO2:  [90 %-99 %] 98 %  BP: (109-135)/(70-86) (P) 107/71     Weight: 77.6 kg (171 lb 1.2 oz)  Body mass index is 21.96 kg/(m^2).    Intake/Output Summary (Last 24 hours) at 03/17/17 1414  Last data filed at 03/17/17 0600   Gross per 24 hour   Intake                0 ml   Output              300 ml   Net             -300 ml      Physical Exam   Constitutional: He is oriented to person, place, and time. He appears well-developed. No distress.   Neck: No JVD present.   Cardiovascular: Regular rhythm, normal heart sounds and intact distal pulses.  Tachycardia present.    No murmur heard.  Pulmonary/Chest: Effort normal. No respiratory distress. He has decreased breath sounds in the right middle field and the right lower field.   Abdominal: Soft.   Musculoskeletal: He exhibits no edema.   Neurological: He is alert and oriented to person, place, and time.   Skin: Skin is warm and dry.       Significant Labs:   BMP:   Recent Labs  Lab 03/17/17  0403   GLU 92   *    K 5.0      CO2 21*   BUN 63*   CREATININE 6.8*   CALCIUM 8.7     CBC:   Recent Labs  Lab 03/16/17  0208   WBC 8.19   HGB 7.3*   HCT 22.7*        CMP:   Recent Labs  Lab 03/16/17  0208 03/17/17  0403   * 133*   K 4.5 5.0    101   CO2 23 21*   * 92   BUN 47* 63*   CREATININE 5.7* 6.8*   CALCIUM 8.6* 8.7   ALBUMIN 2.0* 2.0*   ANIONGAP 10 11   EGFRNONAA 11.0* 8.9*     All pertinent labs within the past 24 hours have been reviewed.    Significant Imaging: I have reviewed all pertinent imaging results/findings within the past 24 hours.

## 2017-03-17 NOTE — PT/OT/SLP PROGRESS
"Physical Therapy      Isidro Lozano Jr [place]  MRN: [id number]    Patient not seen today secondary to  (Attempted in AM and PM. In AM, pt unwilling to participate 2* fatigue. In PM, pt off floor for dialysis. ). Pt given max encouragement during AM attempt but pt continued to decline. Pt repeatedly stating "I just need sleep, man." "Doesn't anyone understand that a sick man needs sleep?"  Will follow-up this weekend, as able.    Yenni De León, PT, DPT   3/17/2017  [phone]    "

## 2017-03-17 NOTE — ASSESSMENT & PLAN NOTE
-nutritional referral and dietary consult education, needs teaching regarding renal diet and coumadin diet as well as appropriate fluid restriction of 1500 mL/day-discussed diet with patient questions answered  -pre albumin 13 and albumin 2  -nepro renal supplements ordered and encouraged patient to consume  -PT evaluation for deconditioning and loss of muscle mass, wants outpatient PT at discharge

## 2017-03-17 NOTE — PLAN OF CARE
Problem: Patient Care Overview  Goal: Plan of Care Review  Outcome: Ongoing (interventions implemented as appropriate)  HD 4 hours, 2 Liters UF removed and tolerated well. Procrit given with tx. Left upper arm de-cannulated and pressure held until hemostasis achieved.

## 2017-03-17 NOTE — PROGRESS NOTES
Progress Note  Vascular Surgery    Admit Date: 3/9/2017  Post-operative Day: 1 Day Post-Op  Hospital Day: 9    SUBJECTIVE:     Follow-up For:  Procedure(s) (LRB):  [id number] Accuseal (Left)    Patient seen and examined. No acute events overnight. Patient notes postsurgical left arm pain but denies complaints of left hand pain or numbness      OBJECTIVE:     Vital Signs (Most Recent)  Temp: 98.6 °F (37 °C) (03/17/17 0800)  Pulse: 108 (03/17/17 0800)  Resp: 18 (03/17/17 0800)  BP: 126/79 (03/17/17 0800)  SpO2: 98 % (03/17/17 0800)    Vital Signs Range (Last 24H):  Temp:  [98 °F (36.7 °C)-99.6 °F (37.6 °C)]   Pulse:  [101-126]   Resp:  [8-22]   BP: (109-172)/(71-95)   SpO2:  [90 %-100 %]     I & O (Last 24H):  Intake/Output Summary (Last 24 hours) at 03/17/17 0903  Last data filed at 03/17/17 0600   Gross per 24 hour   Intake           424.88 ml   Output              300 ml   Net           124.88 ml     Physical Exam:  General: well developed, well nourished, appears stated age, no distress  Head: normocephalic, atraumatic  Eyes:  PERRL, EOMI  Lungs:  clear to auscultation bilaterally and normal respiratory effort  Heart: regular rate and rhythm  Abdomen: soft, non-tender, non-distended  Extremities: Left upper arm AV graft with palpable thrill, moderate postoperative tenderness to palpation, 1+ distal left radial pulse, 5/5 left hand  strength ; Surgical incision sites with dressings in place. No evidence of hematoma    Laboratory:  CBC:   Recent Labs  Lab 03/16/17  0208   WBC 8.19   RBC 2.45*   HGB 7.3*   HCT 22.7*      MCV 93   MCH 29.8   MCHC 32.2     CMP:   Recent Labs  Lab 03/17/17  0403   GLU 92   CALCIUM 8.7   ALBUMIN 2.0*   *   K 5.0   CO2 21*      BUN 63*   CREATININE 6.8*     Coagulation:   Recent Labs  Lab 03/17/17  0403   INR 1.7*         ASSESSMENT/PLAN:     Assessment: 47 y.o.  male with PMhx of HTN, Pulmonary HTN, Congenital heart disease s/p  ASD closure (@ 7y/o), now s/p AVR (mechanical valve 2/8/17), ESRD on HD (began approx 6mos ago) who presented 3/9/17 with SVT in need of permanent HD access - now s/p LUE AVG (Acuseal graft) .    Plan:   - Ok from vascular surgery standpoint to access LUE Acuseal AV graft today per standard Acuseal protocol  - Continue local wound care to the LUE surgical incision sites  - Continue pain control  - Vascular will follow up on results of HD access  - Patient will need to follow up with Dr. Manning in the vascular surgery clinic in 2-4 weeks with VAS US HD access .    Marisa Chauhan DO   Vascular Surgery Fellow  03/17/2017

## 2017-03-17 NOTE — ASSESSMENT & PLAN NOTE
-New onset systolic heart failure along with long standing PulmHTN from congential AS, RAP 15, new onset BIV failure  -2D echo: EF 30-35% concentric hypertrophy,  increased density to the myocardium, suggesting a myocardial infiltrative process (clinical correlation required), right atrial enlargement, right ventricular hypertrophy, moderately to severely depressed right ventricular systolic function, pulmonary hypertension, estimated PA systolic pressure is 53 mmHg.   -tolerating hydralazine 100 mg TID, lisinopril 20 mg daily, metoprolol succinate 200 mg daily, and isosorbide mononitrate 30 mg daily. Discontinued amlodipine.   -sildenafil discontinued, PAP's have improved since AVR  -continued HD per nephrology  -continue lasix 80 mg IVP BID per nephrology recommendations, discuss tomorrow regarding wean to PO in anticipation of discharge  -still makes some urine, Nephrology following, I&O/weight noted

## 2017-03-17 NOTE — ASSESSMENT & PLAN NOTE
- dialysis 3/14 with 3 liter fluid removal and 3/15 with 850 mL removal, early termination of HD s/t SVT  -potassium 4.5 today, HD today with new graft site.  -lasix 80 mg IVP BID-per nephrology recommendations, will discuss transitioning to PO or discontinuation tomorrow in anticipation of nearing discharge  -nephrology following  -labs in AM  -accuseal graft placed in left arm yesterday by vascular surgery, RIJ permacath removed 3/15 by nephrology.  -will need to follow up with Dr. Manning at [facility] in 2-4 weeks with VAS US HD access

## 2017-03-17 NOTE — ASSESSMENT & PLAN NOTE
-tachycardia maintaining at 100-120, tolerating hydralazine 100 mg TID, lisinopril 20 mg daily, metoprolol succinate 200 mg daily, and isosorbide mononitrate 30 mg daily. Will give additional 50 mg metoprolol succinate today, if tolerates increase to 250 mg tomorrow.   -RFA (3/13) with findings of EPS with AT noted; aberrancy with RBBB (clinical) also noted, activation mapping with earliest signal near the lateral tricuspid valve - with RF tachycardia terminated and no longer inducible  -SVT likely multifactorial: questionably anemia induced, s/p 3 units of PRBC's (Iron/TIBC studies indicative of ACD) versus irritability from permacath resting in right atrium versus new onset HF  -SVT episode in HD 3/15, resolved spontaneously, EKG without changes  -continue to monitor tele  -history of SVT per EKG from OSH, [place] [facility] [place]

## 2017-03-17 NOTE — ASSESSMENT & PLAN NOTE
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

## 2017-03-17 NOTE — OP NOTE
OP NOTE    Date: 03/16/2017     Surgeon(s) and Role:  * Alcides Manning MD - Primary  * Dana Ribeiro MD - Resident - Assisting     Pre-op Diagnosis: ESRD (end stage renal disease) [N18.6]; AV access need for acute HD     Post-op Diagnosis: Same     Procedure(s):  Creation of LUE AVG / [place] Accuseal 4/7mm tapered early access graft     Surgeon: Alcides Manning MD [facility]     Anesthesia: Regional     Findings/Key Components:  Strong thrill in LUE AVG  2+ L radial pulse  Surgery done while patient on therapeutic heparin gtt due to AVR; intra-op  > secs       Antibiotic: 1g iv Vancomycin    After an informed consent was obtained the patient was taken to the operating room and placed in the supine position. The patient's L arm underwent a regional block by anesthesia. An Ultrasound was used to assess the upper extremity veins and noted to be adequate for AV graft placement. The L upper extremity was prepped and draped in a sterile fashion. A skin incision was made just above the AC fossa and dissection carried down to the brachial artery. We then made a second incision in the proximal arm/axilla area, dissected through the subcutaneous tissues and identified the proximal brachial/axillary vein. The artery was controlled with vessel loops, opened and flushed with heparinized saline. A 4-7mm Accuseal / PTFE graft was tunneled and the 4mm end sew end-to-side to the brachial artery with 6-0 Prolene. The distal, 7mm, end of the graft was similarly sewn end-to-side to the axillary vein with 6-0 Prolene. After completion of the anastomosis, the vessel loops were released. Hemostasis was secured. Good thrill was noted in the graft and the incision was then closed in two layers, subcutaneous tissue with 3-0 Monocryl and skin with 4-0 Monocryl. Steri-Strips were applied.

## 2017-03-18 LAB
ABO + RH BLD: NORMAL
ALBUMIN SERPL BCP-MCNC: 2 G/DL
ALP SERPL-CCNC: 103 U/L
ALT SERPL W/O P-5'-P-CCNC: 7 U/L
ANION GAP SERPL CALC-SCNC: 12 MMOL/L
AST SERPL-CCNC: 17 U/L
BACTERIA FLD CULT: NORMAL
BASOPHILS # BLD AUTO: 0.02 K/UL
BASOPHILS NFR BLD: 0.2 %
BILIRUB SERPL-MCNC: 0.5 MG/DL
BLD GP AB SCN CELLS X3 SERPL QL: NORMAL
BLD PROD TYP BPU: NORMAL
BLD PROD TYP BPU: NORMAL
BLOOD UNIT EXPIRATION DATE: NORMAL
BLOOD UNIT EXPIRATION DATE: NORMAL
BLOOD UNIT TYPE CODE: 5100
BLOOD UNIT TYPE CODE: 5100
BLOOD UNIT TYPE: NORMAL
BLOOD UNIT TYPE: NORMAL
BUN SERPL-MCNC: 44 MG/DL
CALCIUM SERPL-MCNC: 8.8 MG/DL
CHLORIDE SERPL-SCNC: 100 MMOL/L
CO2 SERPL-SCNC: 21 MMOL/L
CODING SYSTEM: NORMAL
CODING SYSTEM: NORMAL
CREAT SERPL-MCNC: 5.4 MG/DL
DIFFERENTIAL METHOD: ABNORMAL
DISPENSE STATUS: NORMAL
DISPENSE STATUS: NORMAL
EOSINOPHIL # BLD AUTO: 0.4 K/UL
EOSINOPHIL NFR BLD: 4.7 %
ERYTHROCYTE [DISTWIDTH] IN BLOOD BY AUTOMATED COUNT: 17 %
EST. GFR  (AFRICAN AMERICAN): 13.5 ML/MIN/1.73 M^2
EST. GFR  (NON AFRICAN AMERICAN): 11.7 ML/MIN/1.73 M^2
GLUCOSE SERPL-MCNC: 90 MG/DL
HCT VFR BLD AUTO: 21.8 %
HGB BLD-MCNC: 6.8 G/DL
HISTONE IGG SER IA-ACNC: 1.2 UNITS (ref 0–0.9)
INR PPP: 1.8
LYMPHOCYTES # BLD AUTO: 1.3 K/UL
LYMPHOCYTES NFR BLD: 16.1 %
MAGNESIUM SERPL-MCNC: 2.1 MG/DL
MCH RBC QN AUTO: 29.7 PG
MCHC RBC AUTO-ENTMCNC: 31.2 %
MCV RBC AUTO: 95 FL
MONOCYTES # BLD AUTO: 0.9 K/UL
MONOCYTES NFR BLD: 11 %
NEUTROPHILS # BLD AUTO: 5.4 K/UL
NEUTROPHILS NFR BLD: 68 %
PLATELET # BLD AUTO: 210 K/UL
PMV BLD AUTO: 9.9 FL
POTASSIUM SERPL-SCNC: 4.6 MMOL/L
PROT SERPL-MCNC: 8 G/DL
PROTHROMBIN TIME: 18.1 SEC
RBC # BLD AUTO: 2.29 M/UL
SODIUM SERPL-SCNC: 133 MMOL/L
TRANS ERYTHROCYTES VOL PATIENT: NORMAL ML
TRANS ERYTHROCYTES VOL PATIENT: NORMAL ML
WBC # BLD AUTO: 8.01 K/UL

## 2017-03-18 PROCEDURE — 86900 BLOOD TYPING SEROLOGIC ABO: CPT

## 2017-03-18 PROCEDURE — 99233 SBSQ HOSP IP/OBS HIGH 50: CPT | Mod: ,,, | Performed by: NURSE PRACTITIONER

## 2017-03-18 PROCEDURE — P9021 RED BLOOD CELLS UNIT: HCPCS

## 2017-03-18 PROCEDURE — 86920 COMPATIBILITY TEST SPIN: CPT

## 2017-03-18 PROCEDURE — 83735 ASSAY OF MAGNESIUM: CPT

## 2017-03-18 PROCEDURE — 63600175 PHARM REV CODE 636 W HCPCS: Performed by: INTERNAL MEDICINE

## 2017-03-18 PROCEDURE — 36415 COLL VENOUS BLD VENIPUNCTURE: CPT

## 2017-03-18 PROCEDURE — 85610 PROTHROMBIN TIME: CPT

## 2017-03-18 PROCEDURE — 27201040 HC RC 50 FILTER

## 2017-03-18 PROCEDURE — 20600001 HC STEP DOWN PRIVATE ROOM

## 2017-03-18 PROCEDURE — 63600175 PHARM REV CODE 636 W HCPCS: Performed by: NURSE PRACTITIONER

## 2017-03-18 PROCEDURE — 25000003 PHARM REV CODE 250: Performed by: EMERGENCY MEDICINE

## 2017-03-18 PROCEDURE — 25000003 PHARM REV CODE 250: Performed by: INTERNAL MEDICINE

## 2017-03-18 PROCEDURE — 25000003 PHARM REV CODE 250: Performed by: NURSE PRACTITIONER

## 2017-03-18 PROCEDURE — 80053 COMPREHEN METABOLIC PANEL: CPT

## 2017-03-18 PROCEDURE — 25000003 PHARM REV CODE 250: Performed by: PHYSICIAN ASSISTANT

## 2017-03-18 PROCEDURE — 85025 COMPLETE CBC W/AUTO DIFF WBC: CPT

## 2017-03-18 PROCEDURE — 86850 RBC ANTIBODY SCREEN: CPT

## 2017-03-18 RX ORDER — METOPROLOL SUCCINATE 25 MG/1
50 TABLET, EXTENDED RELEASE ORAL ONCE
Status: COMPLETED | OUTPATIENT
Start: 2017-03-18 | End: 2017-03-18

## 2017-03-18 RX ORDER — SODIUM CHLORIDE 9 MG/ML
INJECTION, SOLUTION INTRAVENOUS
Status: DISCONTINUED | OUTPATIENT
Start: 2017-03-18 | End: 2017-03-19 | Stop reason: HOSPADM

## 2017-03-18 RX ORDER — SODIUM CHLORIDE 9 MG/ML
INJECTION, SOLUTION INTRAVENOUS ONCE
Status: DISCONTINUED | OUTPATIENT
Start: 2017-03-18 | End: 2017-03-19 | Stop reason: HOSPADM

## 2017-03-18 RX ORDER — HYDROCODONE BITARTRATE AND ACETAMINOPHEN 500; 5 MG/1; MG/1
TABLET ORAL
Status: DISCONTINUED | OUTPATIENT
Start: 2017-03-18 | End: 2017-03-19 | Stop reason: HOSPADM

## 2017-03-18 RX ORDER — DIPHENHYDRAMINE HYDROCHLORIDE 50 MG/ML
50 INJECTION INTRAMUSCULAR; INTRAVENOUS ONCE
Status: DISCONTINUED | OUTPATIENT
Start: 2017-03-18 | End: 2017-03-18 | Stop reason: SDUPTHER

## 2017-03-18 RX ADMIN — ISOSORBIDE MONONITRATE 30 MG: 30 TABLET, EXTENDED RELEASE ORAL at 09:03

## 2017-03-18 RX ADMIN — STANDARDIZED SENNA CONCENTRATE AND DOCUSATE SODIUM 1 TABLET: 8.6; 5 TABLET, FILM COATED ORAL at 08:03

## 2017-03-18 RX ADMIN — LISINOPRIL 20 MG: 20 TABLET ORAL at 09:03

## 2017-03-18 RX ADMIN — HYDROMORPHONE HYDROCHLORIDE 2 MG: 2 TABLET ORAL at 05:03

## 2017-03-18 RX ADMIN — HYDROMORPHONE HYDROCHLORIDE 2 MG: 2 TABLET ORAL at 12:03

## 2017-03-18 RX ADMIN — SEVELAMER CARBONATE 800 MG: 800 TABLET, FILM COATED ORAL at 09:03

## 2017-03-18 RX ADMIN — FUROSEMIDE 80 MG: 10 INJECTION, SOLUTION INTRAMUSCULAR; INTRAVENOUS at 09:03

## 2017-03-18 RX ADMIN — SEVELAMER CARBONATE 800 MG: 800 TABLET, FILM COATED ORAL at 12:03

## 2017-03-18 RX ADMIN — DIPHENHYDRAMINE HYDROCHLORIDE: 50 INJECTION, SOLUTION INTRAMUSCULAR; INTRAVENOUS at 09:03

## 2017-03-18 RX ADMIN — METOPROLOL SUCCINATE 50 MG: 25 TABLET, EXTENDED RELEASE ORAL at 12:03

## 2017-03-18 RX ADMIN — HYDRALAZINE HYDROCHLORIDE 100 MG: 50 TABLET ORAL at 06:03

## 2017-03-18 RX ADMIN — STANDARDIZED SENNA CONCENTRATE AND DOCUSATE SODIUM 1 TABLET: 8.6; 5 TABLET, FILM COATED ORAL at 09:03

## 2017-03-18 RX ADMIN — SEVELAMER CARBONATE 800 MG: 800 TABLET, FILM COATED ORAL at 05:03

## 2017-03-18 RX ADMIN — HYDRALAZINE HYDROCHLORIDE 100 MG: 50 TABLET ORAL at 01:03

## 2017-03-18 RX ADMIN — OXYCODONE HYDROCHLORIDE 10 MG: 5 TABLET ORAL at 08:03

## 2017-03-18 RX ADMIN — PANTOPRAZOLE SODIUM 40 MG: 40 TABLET, DELAYED RELEASE ORAL at 09:03

## 2017-03-18 RX ADMIN — FUROSEMIDE 80 MG: 10 INJECTION, SOLUTION INTRAMUSCULAR; INTRAVENOUS at 05:03

## 2017-03-18 RX ADMIN — OXYCODONE HYDROCHLORIDE 10 MG: 5 TABLET ORAL at 09:03

## 2017-03-18 RX ADMIN — WARFARIN SODIUM 7.5 MG: 7.5 TABLET ORAL at 05:03

## 2017-03-18 RX ADMIN — Medication 3 ML: at 01:03

## 2017-03-18 RX ADMIN — METOPROLOL SUCCINATE 200 MG: 100 TABLET, FILM COATED, EXTENDED RELEASE ORAL at 09:03

## 2017-03-18 RX ADMIN — HYDRALAZINE HYDROCHLORIDE 100 MG: 50 TABLET ORAL at 08:03

## 2017-03-18 RX ADMIN — OXYCODONE HYDROCHLORIDE 10 MG: 5 TABLET ORAL at 01:03

## 2017-03-18 NOTE — PROGRESS NOTES
03/18/17 1700   Vital Signs   Temp 99.7 °F (37.6 °C)   Temp src Oral   Pulse (!) 115   Resp 18   BP (!) 152/87   sencond PRBC infusing, LASHON Knox NP notified of Vitals, no new order received,will continue to monitor.

## 2017-03-18 NOTE — ASSESSMENT & PLAN NOTE
-New onset systolic heart failure along with long standing PulmHTN from congential AS, RAP 15, new onset BIV failure  -2D echo: EF 30-35% concentric hypertrophy,  increased density to the myocardium, suggesting a myocardial infiltrative process (clinical correlation required), right atrial enlargement, right ventricular hypertrophy, moderately to severely depressed right ventricular systolic function, pulmonary hypertension, estimated PA systolic pressure is 53 mmHg.   -tolerating hydralazine 100 mg TID, lisinopril 20 mg daily, metoprolol succinate 250 mg daily, and isosorbide mononitrate 30 mg daily. Discontinued amlodipine.   -sildenafil discontinued, PAP's have improved since AVR  -continued HD per nephrology  -continue lasix 80 mg IVP BID per nephrology recommendations, discuss tomorrow regarding wean to PO in anticipation of discharge  -still makes some urine, Nephrology following, I&O/weight noted

## 2017-03-18 NOTE — ASSESSMENT & PLAN NOTE
- receiving epoetin 10,000 units MWF appears to have missed several doses  -h/h dropped to 6.8/21.8 today, will transfuse 2 u PRBC  - iron panel appears to be AC   - check stool ob  - retic'ing  - normal LDH/ Hapto so no evidence of hemolysis

## 2017-03-18 NOTE — PLAN OF CARE
Problem: Patient Care Overview  Goal: Plan of Care Review  Outcome: Ongoing (interventions implemented as appropriate)  No significant event during shift, no fall/injury. H/H 6.8/21.8, 2 U PRBC ordered. Pain managed with PRN medications and IS was encouraged. Care plan reviewed with patient.

## 2017-03-18 NOTE — PLAN OF CARE
Problem: Patient Care Overview  Goal: Plan of Care Review  Outcome: Ongoing (interventions implemented as appropriate)  Patient remains free of falls or injury. Patient complains of pain. Treated with PO pain medication. CAROLA fistula placed on 3/16/17 and accessed 3/17/17. Continued on lasix IV push 80 mg BID; possible transition to PO lasix tomorrow. Plan of care reviewed with patient.

## 2017-03-18 NOTE — ASSESSMENT & PLAN NOTE
-tachycardia maintaining at 100-120, tolerating hydralazine 100 mg TID, lisinopril 20 mg daily, metoprolol succinate 250 mg daily, and isosorbide mononitrate 30 mg daily.   -RFA (3/13) with findings of EPS with AT noted; aberrancy with RBBB (clinical) also noted, activation mapping with earliest signal near the lateral tricuspid valve - with RF tachycardia terminated and no longer inducible  -SVT likely multifactorial: questionably anemia induced, s/p 5 units of PRBC's (Iron/TIBC studies indicative of ACD)   -SVT episode in HD 3/15, resolved spontaneously, EKG without changes due to permacath in r atrium  -continue to monitor tele  -history of SVT per EKG from OSH, [place] [facility] [place]

## 2017-03-18 NOTE — ASSESSMENT & PLAN NOTE
- nutritional referral and dietary consult education, needs teaching regarding renal diet and coumadin diet as well as appropriate fluid restriction of 1500 mL/day-discussed diet with patient questions answered  -pre albumin 13 and albumin 2  -nepro renal supplements ordered and encouraged patient to consume  -PT evaluation for deconditioning and loss of muscle mass, wants outpatient PT at discharge

## 2017-03-18 NOTE — SUBJECTIVE & OBJECTIVE
Interval History: Resting in bed, no acute events or distress overnight. Remain tachycardiac, denies chest pain or palpitations. Endorses being tired and not getting to sleep for long periods, and that he is ready to go home. Pain in L graft during dialysis.     Review of Systems   Constitutional: Negative for chills, diaphoresis and fever.   Respiratory: Negative for cough, chest tightness and shortness of breath.    Cardiovascular: Negative for chest pain, palpitations and leg swelling.   Gastrointestinal: Negative for abdominal pain, constipation, nausea and vomiting.   Neurological: Negative for dizziness, light-headedness and headaches.     Objective:     Vital Signs (Most Recent):  Temp: 99.7 °F (37.6 °C) (03/18/17 1700)  Pulse: (!) 115 (03/18/17 1700)  Resp: 18 (03/18/17 1700)  BP: (!) 152/87 (03/18/17 1700)  SpO2: (!) 92 % (03/18/17 0800) Vital Signs (24h Range):  Temp:  [98 °F (36.7 °C)-99.7 °F (37.6 °C)] 99.7 °F (37.6 °C)  Pulse:  [] 115  Resp:  [14-18] 18  SpO2:  [92 %-95 %] 92 %  BP: (123-154)/(75-93) 152/87     Weight: 76 kg (167 lb 8.8 oz)  Body mass index is 21.51 kg/(m^2).    Intake/Output Summary (Last 24 hours) at 03/18/17 1740  Last data filed at 03/18/17 1645   Gross per 24 hour   Intake             1170 ml   Output              250 ml   Net              920 ml      Physical Exam   Constitutional: He is oriented to person, place, and time. He appears well-developed. No distress.   Neck: No JVD present.   Cardiovascular: Regular rhythm, normal heart sounds and intact distal pulses.  Tachycardia present.    No murmur heard.  Pulmonary/Chest: Effort normal. No respiratory distress. He has decreased breath sounds in the right middle field and the right lower field.   Abdominal: Soft.   Musculoskeletal: He exhibits no edema.   Neurological: He is alert and oriented to person, place, and time.   Skin: Skin is warm and dry.       Significant Labs:   BMP:     Recent Labs  Lab 03/18/17  0606   GLU  90   *   K 4.6      CO2 21*   BUN 44*   CREATININE 5.4*   CALCIUM 8.8   MG 2.1     CBC:     Recent Labs  Lab 03/18/17  0606   WBC 8.01   HGB 6.8*   HCT 21.8*        CMP:     Recent Labs  Lab 03/17/17  0403 03/18/17  0606   * 133*   K 5.0 4.6    100   CO2 21* 21*   GLU 92 90   BUN 63* 44*   CREATININE 6.8* 5.4*   CALCIUM 8.7 8.8   PROT  --  8.0   ALBUMIN 2.0* 2.0*   BILITOT  --  0.5   ALKPHOS  --  103   AST  --  17   ALT  --  7*   ANIONGAP 11 12   EGFRNONAA 8.9* 11.7*     All pertinent labs within the past 24 hours have been reviewed.    Significant Imaging: I have reviewed all pertinent imaging results/findings within the past 24 hours.

## 2017-03-18 NOTE — ASSESSMENT & PLAN NOTE
- lasix 80 mg IVP BID-per nephrology recommendations, will discuss transitioning to PO or discontinuation tomorrow in anticipation of nearing discharge  - nephrology following  -accuseal graft placed in left arm by vascular surgery, RIJ permacathi removed 3/15 by nephrology.  -will need to follow up with Dr. Manning at [facility] in 2-4 weeks with VAS US HD access

## 2017-03-18 NOTE — PROGRESS NOTES
"[facility] Hospital Medicine  Progress Note    Patient Name: Isidro White  MRN: [id number]  Patient Class: IP- Inpatient   Admission Date: 3/9/2017  Length of Stay: 9 days  Attending Physician: Penelope Davis MD  Primary Care Provider: Provider [facility] Medicine Team: [facility] HOSP MED ALMA Knox NP    Subjective:     Principal Problem:Acute combined systolic and diastolic heart failure    HPI:  Mr. White is a 46 yo AAM who has PMH of AV replacement and TV repair on February 8, 2017, ASD closure at age 8, ESRD on HD M/W/F, pulmonary hypertension, and HTN. He presented to the ED from [facility] after being seen for SVT and given verapamil. On arrival to the ED he is noted to be in sinus tach around 105-110. He reports a similar episode earlier in the week 3/7/2017 where he was seen at [facility] for what he describes as a fast then slowing then fast then slowing heart rate, he was treated with coreg and sent home. This morning around 0200/0300 he was awoken by the same fast then slowing then fast again heart rate, he asked for assistance from his brother and they checked his blood pressure and heart rate which they report as being heart rate 179, and . Thus prompting his visit to the hospital today. He denies stabbing chest pain, but does however report chest tightness and shortness of breath. He denies any radiation of the midsternal chest tightness. He denies headache, cough, fever, chills, night sweats, N/V/D. He reports strict adherence to renal diet and "32 ounce" fluid restriction. He denies any caffeine intake or energy drink use. He denies anxiety in the past, but endorses stress over the last month with all his medical issues.     He also reports that on 3/4/17 he started to have bleeding from his mouth, nose, and ears. He was seen again at [facility] and given 10 mL of vitamin K for an INR level of 5, per brother. He has had no " further bleeding and denies tarry stools or blood in stools. As an ESRD patient he was dialyzed per his schedule on Wednesday 3/8/2017. He has a vas-cath in the right upper chest and denies bleeding from this site. He reports urinating 2-3 times per day yellow urine, denies hematuria.     He is being admitted to the Hospital Medicine Service for further evaluation.       Hospital Course:  Admitted to Hospital Medicine Service management, for acute SVT and acute anemia, he was transfused two units of blood, and was placed on heparin gtt for sub therapeutic INR of 1.7 in setting of recent mechanical AVR on 2/8/2017 (goal INR 2.0-3.0). He had significant bilateral pleural effusion's and a recent INR of 5, and trouble breathing.  We consulted pulmonary who initially stated no thoracentesis as they felt it was from heart failure and to diurese with dialysis and IV diuresis. Dialysis did not appear to decrese effusion size, pulmonology performed thoracentesis (3/10) with 1500ml of serosang to sang fluid removal. He then had a drop in h/h and there was concern was for rebleeding into the chest as he was noted to be hypotensive with dialysis (which necessitated albumin infusion) and he was transfused another unit of PRBC's. His breath sounds have improved and repeat CXR shows some improvement with continued RLL small amount of pleural effusion. He also has had improvement in shortness of breath and pleuritic pain. His sternal chest pain at incisional site is controlled with current pain med regimen.      As for his SVT, EP performed RFA (3/13) with findings of EPS with AT noted; aberrancy with RBBB (clinical) also noted, activation mapping with earliest signal near the lateral tricuspid valve - with RF tachycardia terminated and no longer inducible. Had episode of  during HD 3/15, resolved spontaneously, EKG obtained with no changes.  His BP has improved over hospital course, heart rate remains tachycardiac in the  100-120 range. His HR and BP are being aggressively treated with hydralazine 100 mg TID, lisinopril 20 mg daily, isosorbide mononitrate 30 mg, and metoprolol succinate 250 mg daily. Sildenafil discontinued 3/14. Blood cultures and urine culture with NGTD. 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 SALIMA negative, anti-dsDNA negative, anti-histone weak positive 1.2 to r/o drug induced lupus pending. Tunneled HD cath removed evening of 3/15. LUE AVG / [place] Accuseal 4/7mm tapered early access graft placed 3/16 by vascular. He was dialyzed on Friday 3/17 and experienced excrutiating pain within the graft during dialysis only. Asked dialysis RN to contact vascular team who inserted.  In interim, his h/h which has been dropping since his transfusion is now down to 6.8/21.8, which he will get 2 u PRBC today, will check stool OB, + retic, iron panel ACD, normal haptoglobin and normal LDH so no signs of hemolysis.  Has not gotten a few doses of epo with dialysis recently.       Interval History: Resting in bed, no acute events or distress overnight. Remain tachycardiac, denies chest pain or palpitations. Endorses being tired and not getting to sleep for long periods, and that he is ready to go home. Pain in L graft during dialysis.     Review of Systems   Constitutional: Negative for chills, diaphoresis and fever.   Respiratory: Negative for cough, chest tightness and shortness of breath.    Cardiovascular: Negative for chest pain, palpitations and leg swelling.   Gastrointestinal: Negative for abdominal pain, constipation, nausea and vomiting.   Neurological: Negative for dizziness, light-headedness and headaches.     Objective:     Vital Signs (Most Recent):  Temp: 99.7 °F (37.6 °C) (03/18/17 1700)  Pulse: (!) 115 (03/18/17 1700)  Resp: 18 (03/18/17 1700)  BP: (!) 152/87 (03/18/17 1700)  SpO2: (!) 92 % (03/18/17 0800) Vital Signs (24h Range):  Temp:  [98 °F (36.7 °C)-99.7 °F (37.6  °C)] 99.7 °F (37.6 °C)  Pulse:  [] 115  Resp:  [14-18] 18  SpO2:  [92 %-95 %] 92 %  BP: (123-154)/(75-93) 152/87     Weight: 76 kg (167 lb 8.8 oz)  Body mass index is 21.51 kg/(m^2).    Intake/Output Summary (Last 24 hours) at 03/18/17 1740  Last data filed at 03/18/17 1645   Gross per 24 hour   Intake             1170 ml   Output              250 ml   Net              920 ml      Physical Exam   Constitutional: He is oriented to person, place, and time. He appears well-developed. No distress.   Neck: No JVD present.   Cardiovascular: Regular rhythm, normal heart sounds and intact distal pulses.  Tachycardia present.    No murmur heard.  Pulmonary/Chest: Effort normal. No respiratory distress. He has decreased breath sounds in the right middle field and the right lower field.   Abdominal: Soft.   Musculoskeletal: He exhibits no edema.   Neurological: He is alert and oriented to person, place, and time.   Skin: Skin is warm and dry.       Significant Labs:   BMP:     Recent Labs  Lab 03/18/17  0606   GLU 90   *   K 4.6      CO2 21*   BUN 44*   CREATININE 5.4*   CALCIUM 8.8   MG 2.1     CBC:     Recent Labs  Lab 03/18/17  0606   WBC 8.01   HGB 6.8*   HCT 21.8*        CMP:     Recent Labs  Lab 03/17/17  0403 03/18/17  0606   * 133*   K 5.0 4.6    100   CO2 21* 21*   GLU 92 90   BUN 63* 44*   CREATININE 6.8* 5.4*   CALCIUM 8.7 8.8   PROT  --  8.0   ALBUMIN 2.0* 2.0*   BILITOT  --  0.5   ALKPHOS  --  103   AST  --  17   ALT  --  7*   ANIONGAP 11 12   EGFRNONAA 8.9* 11.7*     All pertinent labs within the past 24 hours have been reviewed.    Significant Imaging: I have reviewed all pertinent imaging results/findings within the past 24 hours.    Assessment/Plan:      * Acute combined systolic and diastolic heart failure  -New onset systolic heart failure along with long standing PulmHTN from congential AS, RAP 15, new onset BIV failure  -2D echo: EF 30-35% concentric hypertrophy,   increased density to the myocardium, suggesting a myocardial infiltrative process (clinical correlation required), right atrial enlargement, right ventricular hypertrophy, moderately to severely depressed right ventricular systolic function, pulmonary hypertension, estimated PA systolic pressure is 53 mmHg.   -tolerating hydralazine 100 mg TID, lisinopril 20 mg daily, metoprolol succinate 250 mg daily, and isosorbide mononitrate 30 mg daily. Discontinued amlodipine.   -sildenafil discontinued, PAP's have improved since AVR  -continued HD per nephrology  -continue lasix 80 mg IVP BID per nephrology recommendations, discuss tomorrow regarding wean to PO in anticipation of discharge  -still makes some urine, Nephrology following, I&O/weight noted    SVT (supraventricular tachycardia)  -tachycardia maintaining at 100-120, tolerating hydralazine 100 mg TID, lisinopril 20 mg daily, metoprolol succinate 250 mg daily, and isosorbide mononitrate 30 mg daily.   -RFA (3/13) with findings of EPS with AT noted; aberrancy with RBBB (clinical) also noted, activation mapping with earliest signal near the lateral tricuspid valve - with RF tachycardia terminated and no longer inducible  -SVT likely multifactorial: questionably anemia induced, s/p 5 units of PRBC's (Iron/TIBC studies indicative of ACD)   -SVT episode in HD 3/15, resolved spontaneously, EKG without changes due to permacath in r atrium  -continue to monitor tele  -history of SVT per EKG from OSH, in sinus tach on arrival to C    Uncontrolled hypertension  -BP improved, see above medication regimen  -weaned off home clonidine, amlodipine and sildenafil    Pleural effusion  - IS/flutter, cough deep breath/ heart pillow given, encouraged oob  -continue to monitor with diuresis and dialysis, no distress or shortness of breath   -pulmonology performed thoracentesis (3/10) with 1500ml serosang/ sang tap  -CXR 3/14 with small amount of pleural effusion noted on right, left  lung clear  - exam appears to have reaccumilated    Anemia of chronic disease  - receiving epoetin 10,000 units MWF appears to have missed several doses  -h/h dropped to 6.8/21.8 today, will transfuse 2 u PRBC  - iron panel appears to be AC   - check stool ob  - retic'ing  - normal LDH/ Hapto so no evidence of hemolysis    Pulmonary HTN  -sildenafil discontinued 3/14 per cardiology recommendations  -2D echo PaP 53, down from 110 pre-procedure    S/P AVR (aortic valve replacement)  -treating with colchicine 0.3 mg weekly for 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; sed rate and CRP elevated  -blood cultures and urine cultures with NGTD, to r/o infectious process, afebrile   -continue warfarin 7.5 mg,  Goal INR 1.5-2 (AVR 2/8/2017)  -recent h/o INR 5, + bleeding from nose and mouth, s/p vitamin K given at OSH    Adult congenital heart disease  - ASD closure at age 8  - Nonrheumatic aortic valve stenosis s/p AVR 2/2017    ESRD (end stage renal disease)  - lasix 80 mg IVP BID-per nephrology recommendations, will discuss transitioning to PO or discontinuation tomorrow in anticipation of nearing discharge  - nephrology following  -accuseal graft placed in left arm by vascular surgery, RIJ permacath removed 3/15 by nephrology.  -will need to follow up with [facility]4 weeks with VAS US HD access    Severe protein-calorie malnutrition  - nutritional referral and dietary consult education, needs teaching regarding renal diet and coumadin diet as well as appropriate fluid restriction of 1500 mL/day-discussed diet with patient questions answered  -pre albumin 13 and albumin 2  -nepro renal supplements ordered and encouraged patient to consume  -PT evaluation for deconditioning and loss of muscle mass, wants outpatient PT at discharge    VTE Risk Mitigation         Ordered     warfarin (COUMADIN) tablet 7.5 mg  Daily     Route:  Oral        03/15/17 4206          Kika Knox,  NP  Department of Hospital Medicine   [facility]

## 2017-03-18 NOTE — ASSESSMENT & PLAN NOTE
- IS/flutter, cough deep breath/ heart pillow given, encouraged oob  -continue to monitor with diuresis and dialysis, no distress or shortness of breath   -pulmonology performed thoracentesis (3/10) with 1500ml serosang/ sang tap  -CXR 3/14 with small amount of pleural effusion noted on right, left lung clear  - exam appears to have reaccumilated

## 2017-03-19 VITALS
TEMPERATURE: 99 F | DIASTOLIC BLOOD PRESSURE: 85 MMHG | HEIGHT: 74 IN | BODY MASS INDEX: 21.5 KG/M2 | HEART RATE: 116 BPM | OXYGEN SATURATION: 95 % | RESPIRATION RATE: 18 BRPM | WEIGHT: 167.56 LBS | SYSTOLIC BLOOD PRESSURE: 137 MMHG

## 2017-03-19 LAB
ALBUMIN SERPL BCP-MCNC: 2.1 G/DL
ALP SERPL-CCNC: 106 U/L
ALT SERPL W/O P-5'-P-CCNC: 6 U/L
ANION GAP SERPL CALC-SCNC: 10 MMOL/L
AST SERPL-CCNC: 15 U/L
BACTERIA BLD CULT: NORMAL
BACTERIA BLD CULT: NORMAL
BASOPHILS # BLD AUTO: 0.02 K/UL
BASOPHILS NFR BLD: 0.2 %
BILIRUB SERPL-MCNC: 0.7 MG/DL
BUN SERPL-MCNC: 57 MG/DL
CALCIUM SERPL-MCNC: 8.9 MG/DL
CHLORIDE SERPL-SCNC: 99 MMOL/L
CO2 SERPL-SCNC: 25 MMOL/L
CREAT SERPL-MCNC: 6.4 MG/DL
DIFFERENTIAL METHOD: ABNORMAL
EOSINOPHIL # BLD AUTO: 0.3 K/UL
EOSINOPHIL NFR BLD: 3.1 %
ERYTHROCYTE [DISTWIDTH] IN BLOOD BY AUTOMATED COUNT: 16.3 %
EST. GFR  (AFRICAN AMERICAN): 11 ML/MIN/1.73 M^2
EST. GFR  (NON AFRICAN AMERICAN): 9.5 ML/MIN/1.73 M^2
GLUCOSE SERPL-MCNC: 87 MG/DL
HCT VFR BLD AUTO: 26.2 %
HGB BLD-MCNC: 8.9 G/DL
INR PPP: 1.9
LYMPHOCYTES # BLD AUTO: 1.2 K/UL
LYMPHOCYTES NFR BLD: 15.2 %
MAGNESIUM SERPL-MCNC: 2.1 MG/DL
MCH RBC QN AUTO: 30.3 PG
MCHC RBC AUTO-ENTMCNC: 34 %
MCV RBC AUTO: 89 FL
MONOCYTES # BLD AUTO: 0.8 K/UL
MONOCYTES NFR BLD: 9.3 %
NEUTROPHILS # BLD AUTO: 5.8 K/UL
NEUTROPHILS NFR BLD: 72.1 %
PLATELET # BLD AUTO: 223 K/UL
PMV BLD AUTO: 9.6 FL
POTASSIUM SERPL-SCNC: 4.4 MMOL/L
PROT SERPL-MCNC: 7.9 G/DL
PROTHROMBIN TIME: 19 SEC
RBC # BLD AUTO: 2.94 M/UL
SODIUM SERPL-SCNC: 134 MMOL/L
WBC # BLD AUTO: 8.09 K/UL

## 2017-03-19 PROCEDURE — 63600175 PHARM REV CODE 636 W HCPCS: Performed by: NURSE PRACTITIONER

## 2017-03-19 PROCEDURE — 85025 COMPLETE CBC W/AUTO DIFF WBC: CPT

## 2017-03-19 PROCEDURE — 85610 PROTHROMBIN TIME: CPT

## 2017-03-19 PROCEDURE — 36415 COLL VENOUS BLD VENIPUNCTURE: CPT

## 2017-03-19 PROCEDURE — G8980 MOBILITY D/C STATUS: HCPCS | Mod: CI

## 2017-03-19 PROCEDURE — 80053 COMPREHEN METABOLIC PANEL: CPT

## 2017-03-19 PROCEDURE — 97161 PT EVAL LOW COMPLEX 20 MIN: CPT

## 2017-03-19 PROCEDURE — 25000003 PHARM REV CODE 250: Performed by: EMERGENCY MEDICINE

## 2017-03-19 PROCEDURE — 25000003 PHARM REV CODE 250: Performed by: NURSE PRACTITIONER

## 2017-03-19 PROCEDURE — 83735 ASSAY OF MAGNESIUM: CPT

## 2017-03-19 PROCEDURE — 99239 HOSP IP/OBS DSCHRG MGMT >30: CPT | Mod: ,,, | Performed by: NURSE PRACTITIONER

## 2017-03-19 PROCEDURE — G8978 MOBILITY CURRENT STATUS: HCPCS | Mod: CI

## 2017-03-19 PROCEDURE — G8979 MOBILITY GOAL STATUS: HCPCS | Mod: CI

## 2017-03-19 PROCEDURE — 25000003 PHARM REV CODE 250: Performed by: PHYSICIAN ASSISTANT

## 2017-03-19 RX ORDER — LISINOPRIL 20 MG/1
20 TABLET ORAL DAILY
Qty: 90 TABLET | Refills: 0 | Status: SHIPPED | OUTPATIENT
Start: 2017-03-19 | End: 2018-03-19

## 2017-03-19 RX ORDER — ACETAMINOPHEN 325 MG/1
650 TABLET ORAL EVERY 8 HOURS PRN
Refills: 0 | COMMUNITY
Start: 2017-03-19 | End: 2018-12-13

## 2017-03-19 RX ORDER — HYDROMORPHONE HYDROCHLORIDE 2 MG/1
2 TABLET ORAL EVERY 4 HOURS PRN
Qty: 6 TABLET | Refills: 0 | Status: SHIPPED | OUTPATIENT
Start: 2017-03-19 | End: 2018-08-09

## 2017-03-19 RX ORDER — METOPROLOL SUCCINATE 100 MG/1
TABLET, EXTENDED RELEASE ORAL
Qty: 270 TABLET | Refills: 3 | Status: SHIPPED | OUTPATIENT
Start: 2017-03-19 | End: 2018-08-09

## 2017-03-19 RX ORDER — HYDROXYZINE HYDROCHLORIDE 25 MG/1
25 TABLET, FILM COATED ORAL 3 TIMES DAILY PRN
Status: DISCONTINUED | OUTPATIENT
Start: 2017-03-19 | End: 2017-03-19 | Stop reason: HOSPADM

## 2017-03-19 RX ORDER — ISOSORBIDE MONONITRATE 30 MG/1
30 TABLET, EXTENDED RELEASE ORAL DAILY
Qty: 90 TABLET | Refills: 0 | Status: SHIPPED | OUTPATIENT
Start: 2017-03-19 | End: 2017-08-07

## 2017-03-19 RX ORDER — FUROSEMIDE 80 MG/1
80 TABLET ORAL 2 TIMES DAILY
Qty: 180 TABLET | Refills: 0 | Status: SHIPPED | OUTPATIENT
Start: 2017-03-19 | End: 2017-07-06 | Stop reason: SDUPTHER

## 2017-03-19 RX ORDER — COLCHICINE 0.6 MG/1
TABLET ORAL
Qty: 4 TABLET | Refills: 0 | Status: SHIPPED | OUTPATIENT
Start: 2017-03-19 | End: 2018-12-13

## 2017-03-19 RX ORDER — HYDROXYZINE HYDROCHLORIDE 25 MG/1
25 TABLET, FILM COATED ORAL 3 TIMES DAILY PRN
Qty: 60 TABLET | Refills: 0 | Status: SHIPPED | OUTPATIENT
Start: 2017-03-19 | End: 2019-04-22 | Stop reason: SDUPTHER

## 2017-03-19 RX ADMIN — SEVELAMER CARBONATE 800 MG: 800 TABLET, FILM COATED ORAL at 01:03

## 2017-03-19 RX ADMIN — ISOSORBIDE MONONITRATE 30 MG: 30 TABLET, EXTENDED RELEASE ORAL at 08:03

## 2017-03-19 RX ADMIN — LISINOPRIL 20 MG: 20 TABLET ORAL at 08:03

## 2017-03-19 RX ADMIN — STANDARDIZED SENNA CONCENTRATE AND DOCUSATE SODIUM 1 TABLET: 8.6; 5 TABLET, FILM COATED ORAL at 08:03

## 2017-03-19 RX ADMIN — OXYCODONE HYDROCHLORIDE 10 MG: 5 TABLET ORAL at 08:03

## 2017-03-19 RX ADMIN — METOPROLOL SUCCINATE 250 MG: 25 TABLET, EXTENDED RELEASE ORAL at 08:03

## 2017-03-19 RX ADMIN — HYDRALAZINE HYDROCHLORIDE 100 MG: 50 TABLET ORAL at 01:03

## 2017-03-19 RX ADMIN — SEVELAMER CARBONATE 800 MG: 800 TABLET, FILM COATED ORAL at 08:03

## 2017-03-19 RX ADMIN — PANTOPRAZOLE SODIUM 40 MG: 40 TABLET, DELAYED RELEASE ORAL at 08:03

## 2017-03-19 RX ADMIN — HYDRALAZINE HYDROCHLORIDE 100 MG: 50 TABLET ORAL at 06:03

## 2017-03-19 RX ADMIN — FUROSEMIDE 80 MG: 10 INJECTION, SOLUTION INTRAMUSCULAR; INTRAVENOUS at 08:03

## 2017-03-19 RX ADMIN — HYDROMORPHONE HYDROCHLORIDE 2 MG: 2 TABLET ORAL at 06:03

## 2017-03-19 NOTE — ASSESSMENT & PLAN NOTE
- receiving epoetin 10,000 units MW appears to have missed several doses  -h/h dropped to 6.8/21.8 today, will transfuse 2 u PRBC  - iron panel appears to be ACD and inflammatory high sed rate  - check stool ob, unable to get specimen  - retic'ing  - normal LDH/ Hapto so no evidence of hemolysis

## 2017-03-19 NOTE — ASSESSMENT & PLAN NOTE
- BP improved, see above medication regimen  - weaned off home clonidine, amlodipine and sildenafil

## 2017-03-19 NOTE — PROGRESS NOTES
Patient complaining of itching on generalized back. States he started itching when blood transfusion started. 2/2 units PRBCs completed. Vital signs stable. No order to recheck CBC post blood transfusion. Dr. Riggs notified; order for 50 mg IV benadryl now. No order for repeat CBC at this time. No further orders given. Will continue to monitor.

## 2017-03-19 NOTE — ASSESSMENT & PLAN NOTE
-tachycardia maintaining at 100-120, tolerating hydralazine 100 mg TID, lisinopril 20 mg daily, metoprolol succinate 250 mg daily (titrating), and isosorbide mononitrate 30 mg daily. Caveat, he was rate controlled for a period of time on high dose labetolol 400mg tid, however this is not HF approved by research)  -s/p RFA (3/13) with findings of EPS with AT noted; aberrancy with RBBB (clinical) also noted, activation mapping with earliest signal near the lateral tricuspid valve - with RF tachycardia terminated and no longer inducible  -SVT likely multifactorial: questionably anemia induced, s/p 5 units of PRBC's (Iron/TIBC studies indicative of ACD)   -SVT episode in HD 3/15, resolved spontaneously, EKG without changes due to permacath in r atrium  -continue to monitor tele  -history of SVT per EKG from OSH, in sinus tach on arrival to [facility]  - f/u with Dr. Guallpa in 6 weeks

## 2017-03-19 NOTE — NURSING
Patient discharged per MD order. AVS reviewed and patient verbalized understanding. Tele removed and returned to nursing station. PIV removed with cathter tip intact. Patient waiting for ride, will continue to monitor.

## 2017-03-19 NOTE — ASSESSMENT & PLAN NOTE
- nutritional referral and dietary consult education, needs teaching regarding renal diet and coumadin diet as well as appropriate fluid restriction of 1500 mL/day-discussed diet with patient questions answered  -pre albumin 13 and albumin 2  -nepro renal supplements ordered and encouraged patient to consume  -PT evaluation for deconditioning and loss of muscle mass, wants outpatient PT at discharge  - patient admitted he was not drinking supplements, however he did stash them in his to go bag.

## 2017-03-19 NOTE — PT/OT/SLP EVAL
"Physical Therapy  Evaluation/Discharge Summary    Isidro White   MRN: [id number]   Admitting Diagnosis: Acute combined systolic and diastolic heart failure    PT Received On: 17  PT Start Time: 1119     PT Stop Time: 1129    PT Total Time (min): 10 min       Billable Minutes:  Evaluation 10    Diagnosis: Acute combined systolic and diastolic heart failure      Past Medical History:   Diagnosis Date    CKD (chronic kidney disease) stage 3, GFR 30-59 ml/min     Hypertension     Tachycardia       Past Surgical History:   Procedure Laterality Date    CARDIAC SURGERY      aortic valve replacement       Referring physician: Stacy Flynn NP   Date referred to PT: 3/16/17    General Precautions: Standard,  (Ambulate with Assist)  Orthopedic Precautions: N/A   Braces: N/A       Do you have any cultural, spiritual, Adventist conflicts, given your current situation?: none stated    Patient History:  Living Environment Comment: Pt reports he lives with his brother in a H with no LAKE. PTA, pt was (I) with all mobility. Owns no DME. Bath has walk-in shower, no bench/seat present. Brother is able to assist as needed upon d/c.   Equipment Currently Used at Home: none      Previous Level of Function:   Independent with all mobility and ADLs.     Subjective:  Communicated with RN prior to session.    "I'm ready to go. I just need to know what medicines I need to take to keep my heart-rate normal. My stuff is packed. These bandages need to go."   Chief Complaint: None stated  Patient goals: To go home.     Pain Ratin/10   Pain Rating Post-Intervention: 0/10    Objective:   Patient found with: peripheral IV, telemetry     Cognitive Exam:  Oriented to: Person, Place, Time and Situation    Follows Commands/attention: Follows multistep  commands  Communication: clear/fluent  Safety awareness/insight to disability: intact    Physical Exam:  Postural examination/scapula alignment: No postural abnormalities " identified    Skin integrity: Visible skin intact  Edema: None noted      Sensation:   Intact    Upper Extremity Range of Motion:  Right Upper Extremity: WFL  Left Upper Extremity: WFL    Upper Extremity Strength:  Right Upper Extremity: WFL  Left Upper Extremity: WFL    Lower Extremity Range of Motion:  Right Lower Extremity: WFL  Left Lower Extremity: WFL    Lower Extremity Strength:  Right Lower Extremity: WFL  Left Lower Extremity: WFL     Fine motor coordination:  Intact    Gross motor coordination: WFL    Functional Mobility:    Bed Mobility:  Not assessed 2° pt standing up in room.       Transfers:   Not assessed       Gait:   PT observed pt walking within room, around bed & to/from bathroom, x2 trips. Pt required no AD and demonstrated good balance & safety.  No LOB demonstrated.         Balance:   Static Sit: GOOD: Takes MODERATE challenges from all directions  Dynamic Sit: GOOD: Maintains balance through MODERATE excursions of active trunk movement  Static Stand: GOOD: Takes MODERATE challenges from all directions  Dynamic stand: GOOD: Needs SUPERVISION only during gait and able to self right with moderate     Education:  Education provided to pt regarding: PT role in acute setting; safety upon d/c. Verbalized understanding.     Whiteboard updated with correct mobility information. RN/PCT notified.  Pt ok to walk (I)'ly within room or hallway with RN permission.       AM-PAC 6 CLICK MOBILITY  How much help from another person does this patient currently need?   1 = Unable, Total/Dependent Assistance  2 = A lot, Maximum/Moderate Assistance  3 = A little, Minimum/Contact Guard/Supervision  4 = None, Modified [place]/Independent    Turning over in bed (including adjusting bedclothes, sheets and blankets)?: 4  Sitting down on and standing up from a chair with arms (e.g., wheelchair, bedside commode, etc.): 4  Moving from lying on back to sitting on the side of the bed?: 4  Moving to and from a bed to a  chair (including a wheelchair)?: 4  Need to walk in hospital room?: 4  Climbing 3-5 steps with a railing?: 3  Total Score: 23     AM-PAC Raw Score CMS G-Code Modifier Level of Impairment Assistance   6 % Total / Unable   7 - 9 CM 80 - 100% Maximal Assist   10 - 14 CL 60 - 80% Moderate Assist   15 - 19 CK 40 - 60% Moderate Assist   20 - 22 CJ 20 - 40% Minimal Assist   23 CI 1-20% SBA / CGA   24 CH 0% Independent/ Mod I     Patient left sitting on EOB with all lines intact and call button in reach.    Assessment:   Isidro White is a 47 y.o. male with a medical diagnosis of Acute combined systolic and diastolic heart failure and presents with baseline mobility and balance with no LOB or SOB. Pt demonstrates good safety awareness with all mobility. Pt does not demonstrate a need for acute PT services. At this time, pt will be d/c from acute PT. Please re-consult should pt's functional status change. Recommend d/c to home with brother's assist as needed.      Rehab identified problem list/impairments: Rehab identified problem list/impairments:  (none demonstrated)    Rehab potential is good.    Activity tolerance: Good    Discharge recommendations: Discharge Facility/Level Of Care Needs: home     Barriers to discharge: Barriers to Discharge: None    Equipment recommendations: Equipment Needed After Discharge: none     GOALS:   Physical Therapy Goals     Not on file      Multidisciplinary Problems (Resolved)        Problem: Physical Therapy Goal    Goal Priority Disciplines Outcome Goal Variances Interventions   Physical Therapy Goal   (Resolved)     PT/OT, PT Outcome(s) achieved     Description:      Pt demonstrated (I) mobility with baseline balance. No acute goals needed at this time.               PLAN:  D/c acute PT services at this time. Pt is supervision/mod (I) with all mobility.     Functional Assessment Tool Used: [facility]  Score: 23  Functional Limitation: Mobility: Walking and moving  around  Mobility: Walking and Moving Around Current Status (): CI  Mobility: Walking and Moving Around Goal Status (): CI  Mobility: Walking and Moving Around Discharge Status (): CI     Aishwarya Andersen, PT  03/19/2017

## 2017-03-19 NOTE — PLAN OF CARE
Problem: Physical Therapy Goal  Goal: Physical Therapy Goal      Pt demonstrated (I) mobility with baseline balance. No acute goals needed at this time.   Outcome: Outcome(s) achieved Date Met:  03/19/17           PT evaluation completed. Pt is supervision/mod (I) with all mobility and transfers. Pt does not demonstrate a need for skilled acute PT services. Eval/DC note to follow.      Aishwarya Andersen, PT, DPT  03/19/2017

## 2017-03-19 NOTE — ASSESSMENT & PLAN NOTE
- Acute on chronic systolic heart failure along with long standing PulmHTN from congential AS, RAP 15, new onset BIV failure  -2D echo: EF 30-35% concentric hypertrophy,  increased density to the myocardium, suggesting a myocardial infiltrative process (clinical correlation required), right atrial enlargement, right ventricular hypertrophy, moderately to severely depressed right ventricular systolic function, pulmonary hypertension, estimated PA systolic pressure is 53 mmHg.   -tolerating hydralazine 100 mg TID, lisinopril 20 mg daily, metoprolol succinate 250 mg daily, and isosorbide mononitrate 30 mg daily. Discontinued amlodipine.   -sildenafil discontinued, PAP's have improved since AVR  -continued HD per nephrology  -continue lasix 80 mg po bid per nephrology recommendations  -still makes some urine, Nephrology following, I&O/weight noted

## 2017-03-19 NOTE — ASSESSMENT & PLAN NOTE
- lasix 80 mg IVP BID-per nephrology recommendations, transitioned to PO for discharge  - nephrology following  -accuseal graft placed in left arm by vascular surgery, MUNIR permacath removed 3/15 by nephrology.  -will need to follow up with [facility] in 2-4 weeks with VAS US HD access

## 2017-03-19 NOTE — ASSESSMENT & PLAN NOTE
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

## 2017-03-19 NOTE — PLAN OF CARE
Problem: Patient Care Overview  Goal: Plan of Care Review  Outcome: Ongoing (interventions implemented as appropriate)  Patient remains free of falls or injury. Patient complains of pain. Treated with PO pain medication. CAROLA fistula placed on 3/16/17 and accessed 3/17/17. Continued on lasix IV push 80 mg BID. Occult stool sample pending; patient without BM this shift. 2 units PRBCs completed. Continue to monitor CBC. Plan of care reviewed with patient and brother.

## 2017-03-19 NOTE — DISCHARGE SUMMARY
"[facility] Hospital Medicine  Discharge Summary      Patient Name: Isidro White  MRN: [id number]  Admission Date: 3/9/2017  Hospital Length of Stay: 10 days  Discharge Date and Time:  03/19/2017 1:26 PM  Attending Physician: Penelope Davis MD   Discharging Provider: Kika Knox NP  Primary Care Provider: Provider [facility] Medicine Team: [facility]  Kika Knox NP    HPI:   Mr. White is a 48 yo AAM who has PMH of AV replacement and TV repair on February 8, 2017, ASD closure at age 8, ESRD on HD M/W/F, pulmonary hypertension, and HTN. He presented to the ED from [facility] after being seen for SVT and given verapamil. On arrival to the ED he is noted to be in sinus tach around 105-110. He reports a similar episode earlier in the week 3/7/2017 where he was seen at [facility] for what he describes as a fast then slowing then fast then slowing heart rate, he was treated with coreg and sent home. This morning around 0200/0300 he was awoken by the same fast then slowing then fast again heart rate, he asked for assistance from his brother and they checked his blood pressure and heart rate which they report as being heart rate 179, and . Thus prompting his visit to the hospital today. He denies stabbing chest pain, but does however report chest tightness and shortness of breath. He denies any radiation of the midsternal chest tightness. He denies headache, cough, fever, chills, night sweats, N/V/D. He reports strict adherence to renal diet and "32 ounce" fluid restriction. He denies any caffeine intake or energy drink use. He denies anxiety in the past, but endorses stress over the last month with all his medical issues.     He also reports that on 3/4/17 he started to have bleeding from his mouth, nose, and ears. He was seen again at [facility] and given 10 mL of vitamin K for an INR level of 5, per brother. He has had no further bleeding " and denies tarry stools or blood in stools. As an ESRD patient he was dialyzed per his schedule on Wednesday 3/8/2017. He has a vas-cath in the right upper chest and denies bleeding from this site. He reports urinating 2-3 times per day yellow urine, denies hematuria.     He is being admitted to the Hospital Medicine Service for further evaluation.       Procedure(s) (LRB):  [id number] Accuseal (Left)      Indwelling Lines/Drains at time of discharge:   Lines/Drains/Airways     Drain                 Hemodialysis AV Graft 03/16/17 0859 Left upper arm 3 days            Review of Systems   Constitutional: Negative for chills, diaphoresis and fever.   Respiratory: Negative for cough, chest tightness and shortness of breath.   Cardiovascular: Negative for chest pain, palpitations and leg swelling.   Gastrointestinal: Negative for abdominal pain, constipation, nausea and vomiting.   Neurological: Negative for dizziness, light-headedness and headaches.   Skin/ Integ: itching +    Physical Exam   Constitutional: He is oriented to person, place, and time. He appears cachectic, bitemporal wasting. No distress.   Neck: No JVD present.   Cardiovascular: Regular rhythm, normal heart sounds and intact distal pulses. Tachycardia present.   No murmur heard.  Pulmonary/Chest: Effort normal. No respiratory distress. He has decreased breath sounds in the right middle field and the right lower field.   Abdominal: Soft, round, + bs.   Musculoskeletal: He exhibits no edema.   Neurological: He is alert and oriented to person, place, and time.   Skin: Skin is warm and dry. Evidence of scratch marks     Hospital Course:   Admitted to Hospital Medicine Service management, for acute SVT and acute anemia 6.6/20.9, he was transfused two units of blood, and was placed on heparin gtt for sub therapeutic INR of 1.7 in setting of recent mechanical AVR on 2/8/2017 (goal INR 2.0-3.0). He had significant bilateral pleural effusion's  and a recent INR of 5, and trouble breathing.  We consulted pulmonary who initially stated no thoracentesis as they felt it was from heart failure and to diurese with dialysis and IV diuresis. Dialysis did not appear to decrease the effusion size, pulmonology performed thoracentesis (3/10) with 1500ml of serosang to sang fluid removal. He then had a drop in h/h and there was concern was for rebleeding into the chest as he was noted to be hypotensive with dialysis (which necessitated albumin infusion) however he had been given his b/p meds prior to dialysis in addition he was transfused another unit of PRBC's. His breath sounds had improved and repeat CXR shows some improvement with continued RLL small amount of pleural effusion. He also has had improvement in shortness of breath and pleuritic pain. His sternal chest pain at incisional site is controlled with current pain med regimen.  He is still making urine and was tolerating 80mg iv bid lasix to help with fluid management as he was fluid overloaded as seen on Echo 3/10/17.  He will be sent home on lasix 80mg bid.     As for his SVT, EP performed RFA (3/13) with findings of EPS with AT noted; aberrancy with RBBB (clinical) also noted, activation mapping with earliest signal near the lateral tricuspid valve - with RF tachycardia terminated and no longer inducible. Had episode of  during HD 3/15, resolved spontaneously, EKG obtained with no changes.  His BP has improved over hospital course, heart rate remains tachycardiac in the 100-120 range. His HR and BP are being aggressively treated with hydralazine 100 mg TID, lisinopril 20 mg daily, isosorbide mononitrate 30 mg, and metoprolol succinate 250 mg daily. Sildenafil discontinued 3/14. Blood cultures and urine culture with NGTD. 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 SALIMA negative, anti-dsDNA negative, anti-histone weak positive 1.2, which were  ordered to r/o drug induced lupus. Tunneled HD cath removed evening of 3/15. LUE AVG / [place] Accuseal 4/7mm tapered early access graft placed 3/16 by vascular. He was dialyzed on Friday 3/17 and experienced excrutiating pain within the graft during dialysis only. Asked dialysis RN to contact vascular team who inserted.  In interim, his h/h which has been dropping since his transfusion and was down to 6.8/21.8, which he was given 2 u PRBC, unable to get stool OB, + retic'ing, iron panel ACD, normal haptoglobin and normal LDH so no signs of hemolysis.  Has not gotten a few doses of epo with dialysis recently.   Of note patient was encourage to participate with physical therapy which he refused on several occasions, he was offered dietary services and supplements which he did not drink (per patient), he was given an IS which he put in his overnight bag and has not come out since, he has been uncooperative with staff at times and has asked staff to leave his room.  It's an unfortunate situation this young man finds himself, and he's been set up with HTS/ EP for follow up along with returning to his normal dialysis chair on Monday. His new warfarin goal is 1.5-2.0 for his aortic valve which is based on a new study that was just released this week. Pharmacology confirmed the current INR regimen. Patient is encouraged to follow a renal and low sodium diet, as for the coumadin diet, he will need to speak with his warfarin pharmacist about adjusting according to his nutritional intake.  He is emaciated and severe protein malnourished and could use more options for diet.        Consults:   Consults         Status Ordering Provider     Inpatient consult to Cardiology  Once     Provider:  (Not yet assigned)    Completed GERI BROOKS     Inpatient consult to Dietary  Once     Provider:  (Not yet assigned)    Completed WENDY HOOVER     Inpatient consult to Electrophysiology  Once     Provider:  (Not yet assigned)     Completed CHAD QUESADA     Inpatient consult to Nephrology  Once     Provider:  (Not yet assigned)    Completed AUSTIN KHAN     Inpatient consult to Pulmonology  Once     Provider:  (Not yet assigned)    Completed CHAD QUESADA     Inpatient consult to Vascular Surgery  Once     Provider:  (Not yet assigned)    Completed AUSTIN KHAN     IP consult to dietary  Once     Provider:  (Not yet assigned)    CHAD Palomo     Nutrition Services Referral  Once     Provider:  (Not yet assigned)    CHAD Palomo          Significant Diagnostic Studies: Labs:   BMP:   Recent Labs  Lab 03/18/17  0606 03/19/17  0617   GLU 90 87   * 134*   K 4.6 4.4    99   CO2 21* 25   BUN 44* 57*   CREATININE 5.4* 6.4*   CALCIUM 8.8 8.9   MG 2.1 2.1   , CMP   Recent Labs  Lab 03/18/17  0606 03/19/17  0617   * 134*   K 4.6 4.4    99   CO2 21* 25   GLU 90 87   BUN 44* 57*   CREATININE 5.4* 6.4*   CALCIUM 8.8 8.9   PROT 8.0 7.9   ALBUMIN 2.0* 2.1*   BILITOT 0.5 0.7   ALKPHOS 103 106   AST 17 15   ALT 7* 6*   ANIONGAP 12 10   ESTGFRAFRICA 13.5* 11.0*   EGFRNONAA 11.7* 9.5*   , CBC   Recent Labs  Lab 03/18/17  0606 03/19/17 0617   WBC 8.01 8.09   HGB 6.8* 8.9*   HCT 21.8* 26.2*    223   , INR   Lab Results   Component Value Date    INR 1.9 (H) 03/19/2017    INR 1.8 (H) 03/18/2017    INR 1.7 (H) 03/17/2017   , Lipid Panel   Lab Results   Component Value Date    CHOL 215 (H) 02/26/2012    HDL 26 (L) 02/26/2012    LDLCALC 153.0 02/26/2012    TRIG 182 (H) 02/26/2012    CHOLHDL 12.1 (L) 02/26/2012      Microbiology:   Blood Culture   Lab Results   Component Value Date    LABBLOO No Growth to date 03/14/2017    LABBLOO No Growth to date 03/14/2017    LABBLOO No Growth to date 03/14/2017    LABBLOO No Growth to date 03/14/2017    LABBLOO No Growth to date 03/14/2017    LABBLOO No Growth to date 03/14/2017    LABBLOO No Growth to date 03/14/2017    LABBLOO No  Growth to date 03/14/2017    LABBLOO No Growth to date 03/14/2017    LABBLOO No Growth to date 03/14/2017    and body fluid, no growth to date    Radiology: X-Ray: CXR: X-Ray Chest 1 View (CXR):   Results for orders placed or performed during the hospital encounter of 03/09/17   X-Ray Chest 1 View    Narrative    Central venous catheter to remain.  Continued opacification of the lung bases more on the right side consistent with the underlying pleural effusion and a combination of atelectatic changes.  The lung apices are clear.    Impression     See above      Electronically signed by: Christopher Levin MD  Date:     03/10/17  Time:    14:21     and X-Ray Chest PA and Lateral (CXR):   Results for orders placed or performed during the hospital encounter of 03/09/17   X-Ray Chest PA And Lateral    Narrative    Postthoracentesis.  Central venous catheter remain.  Cardiomegaly.  Opacification of the right lung base probably atelectatic changes and small amount of pleural effusion.  The left lung is clear.    Impression     See above      Electronically signed by: Christopher Levin MD  Date:     03/14/17  Time:    12:42        Pending Diagnostic Studies:     Procedure Component Value Units Date/Time    X-Ray Chest PA And Lateral [[id number]]     Order Status:  Sent Lab Status:  No result         Final Active Diagnoses:    Diagnosis Date Noted POA    PRINCIPAL PROBLEM:  Acute combined systolic and diastolic heart failure [I50.41] 03/11/2017 Yes    SVT (supraventricular tachycardia) [I47.1] 03/09/2017 No    Uncontrolled hypertension [I10]  Yes    Anemia of chronic disease [D63.8] 03/10/2017 Yes    Pleural effusion [J90] 03/09/2017 Yes    Pulmonary HTN [I27.2] 02/02/2017 Yes    S/P AVR (aortic valve replacement) [Z95.2] 02/09/2017 Not Applicable    Adult congenital heart disease [Q24.9] 02/04/2017 Not Applicable    ESRD (end stage renal disease) [N18.6] 02/13/2017 Yes    Severe protein-calorie malnutrition [E43] 03/09/2017 Yes       Problems Resolved During this Admission:    Diagnosis Date Noted Date Resolved POA    Volume depletion [E86.9] 03/09/2017 03/11/2017 Yes      * Acute combined systolic and diastolic heart failure  - Acute on chronic systolic heart failure along with long standing PulmHTN from congential AS, RAP 15, new onset BIV failure  -2D echo: EF 30-35% concentric hypertrophy,  increased density to the myocardium, suggesting a myocardial infiltrative process (clinical correlation required), right atrial enlargement, right ventricular hypertrophy, moderately to severely depressed right ventricular systolic function, pulmonary hypertension, estimated PA systolic pressure is 53 mmHg.   -tolerating hydralazine 100 mg TID, lisinopril 20 mg daily, metoprolol succinate 250 mg daily, and isosorbide mononitrate 30 mg daily. Discontinued amlodipine.   -sildenafil discontinued, PAP's have improved since AVR  -continued HD per nephrology  -continue lasix 80 mg po bid per nephrology recommendations  -still makes some urine, Nephrology following, I&O/weight noted    SVT (supraventricular tachycardia)  -tachycardia maintaining at 100-120, tolerating hydralazine 100 mg TID, lisinopril 20 mg daily, metoprolol succinate 250 mg daily (titrating), and isosorbide mononitrate 30 mg daily. Caveat, he was rate controlled for a period of time on high dose labetolol 400mg tid, however this is not HF approved by research)  -s/p RFA (3/13) with findings of EPS with AT noted; aberrancy with RBBB (clinical) also noted, activation mapping with earliest signal near the lateral tricuspid valve - with RF tachycardia terminated and no longer inducible  -SVT likely multifactorial: questionably anemia induced, s/p 5 units of PRBC's (Iron/TIBC studies indicative of ACD)   -SVT episode in HD 3/15, resolved spontaneously, EKG without changes due to permacath in r atrium  -continue to monitor tele  -history of SVT per EKG from OSH, in sinus tach on arrival to  [facility]  - f/u with Dr. Guallpa in 6 weeks    Uncontrolled hypertension  - BP improved, see above medication regimen  - weaned off home clonidine, amlodipine and sildenafil    Pleural effusion  - IS/flutter, cough deep breath/ heart pillow given, encouraged oob  - continue to monitor with diuresis and dialysis, no distress or shortness of breath   - pulmonology performed thoracentesis (3/10) with 1500ml serosang/ sang tap  - CXR 3/14 with small amount of pleural effusion noted on right, left lung clear  - exam appears to have reaccumilated  - patient not using, IS, not getting out of bed  - fluid analysis:  Amylase, Fluid     114       Glucose, Fluid     90      LD, Fluid     250       Mesothelial cells, Fluid     1     Body Fluid Culture No growth after 5 days.     Body Fluid, Protein     4.3     LD, Fluid Not established U/L 250     Glucose, Fluid Not established mg/dL 90     Amylase, Fluid Not established U/L 114     Fluid Color     Red       Fluid Appearance     Bloody      Body Fluid Type     Pleur...      WBC, Body Fluid     740      Segs, Fluid     17      Lymphs, Fluid     48      Monocytes/Macrophages, Fluid     31      Eos, Fluid     3       LD serum    249     Total Protein serum    7.7     * Exudative effusion                      Anemia of chronic disease  - receiving epoetin 10,000 units MWF appears to have missed several doses  -h/h dropped to 6.8/21.8 today, will transfuse 2 u PRBC  - iron panel appears to be ACD and inflammatory high sed rate  - check stool ob, unable to get specimen  - retic'ing  - normal LDH/ Hapto so no evidence of hemolysis    Pulmonary HTN  -sildenafil discontinued 3/14 per cardiology recommendations  -2D echo PaP 53, down from 110 pre-procedure    S/P AVR (aortic valve replacement)  -treating with colchicine 0.3 mg weekly for 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; sed rate and CRP elevated  -blood cultures and urine cultures with NGTD, to r/o infectious process,  afebrile   -continue warfarin 7.5 mg,  Goal INR 1.5-2 (AVR 2/8/2017)  -recent h/o INR 5, + bleeding from nose and mouth, s/p vitamin K given at OSH    Adult congenital heart disease  - ASD closure at age 8  - Nonrheumatic aortic valve stenosis s/p AVR 2/2017    ESRD (end stage renal disease)  - lasix 80 mg IVP BID-per nephrology recommendations, transitioned to PO for discharge  - nephrology following  -accuseal graft placed in left arm by vascular surgery, RIJ permacath removed 3/15 by nephrology.  -will need to follow up with [facility]-4 weeks with VAS US HD access    Severe protein-calorie malnutrition  - nutritional referral and dietary consult education, needs teaching regarding renal diet and coumadin diet as well as appropriate fluid restriction of 1500 mL/day-discussed diet with patient questions answered  -pre albumin 13 and albumin 2  -nepro renal supplements ordered and encouraged patient to consume  -PT evaluation for deconditioning and loss of muscle mass, wants outpatient PT at discharge  - patient admitted he was not drinking supplements, however he did stash them in his to go bag.       Discharged Condition: fair    Disposition: Home or Self Care    Follow Up:  Follow-up Information     Follow up with Alcides Manning MD In 2 weeks.    Specialty:  Vascular Surgery    Why:  for f/u HD access, needs VAS US HD access    Contact information:    [facility] [address] [phone]          Follow up with [facility].    Why:  will need urgent follow up post discharge for INR monitoring and warfarin dosing    Contact information:    Address: [address] [place] [id number]  Phone: [phone]        Follow up with Nigel Guallpa MD In 6 weeks.    Specialties:  Electrophysiology, Cardiovascular Disease    Why:  f/u svt ablation     Contact information:    [facility] [address] [phone]          Follow  up with [facility] In 2 weeks.    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    Contact information:    Annalee [address]  [facility] [address] [phone]    Additional information:    3rd floor        Patient Instructions:     Ambulatory Referral to Vascular Surgery   Referral Priority: Routine Referral Type: Consultation   Referral Reason: Specialty Services Required    Referred to Provider: RYDER MAE Requested Specialty: Vascular Surgery   Number of Visits Requested: 1      Ambulatory referral to Cardiology   Referral Priority: Routine Referral Type: Consultation   Referral Reason: Specialty Services Required    Referred to Provider: JONH MCRAE Requested Specialty: Cardiology   Number of Visits Requested: 1      Ambulatory referral to Cardiology   Referral Priority: Routine Referral Type: Consultation   Referral Reason: Specialty Services Required    Requested Specialty: Cardiology    Number of Visits Requested: 1      Diet general   Order Specific Question Answer Comments   Na restriction, if any: 2gNa    Fluid restriction: Fluid - 1500mL    Additional restrictions: Renal    Additional restrictions: Low Sodium,2gm      Activity as tolerated     Call MD for:  temperature >100.4     Call MD for:  persistent nausea and vomiting or diarrhea     Call MD for:  severe uncontrolled pain     Call MD for:  redness, tenderness, or signs of infection (pain, swelling, redness, odor or green/yellow discharge around incision site)     Call MD for:  difficulty breathing or increased cough     Call MD for:  severe persistent headache     Call MD for:  worsening rash     Call MD for:  persistent dizziness, light-headedness, or visual disturbances     Call MD for:  increased confusion or weakness       Medications:  Reconciled Home Medications:   Current Discharge Medication List      START taking these medications     Details   acetaminophen (TYLENOL) 325 MG tablet Take 2 tablets (650 mg total) by mouth every 8 (eight) hours as needed.  Refills: 0      colchicine 0.6 mg tablet Take a half tablet every 7 days.  Qty: 4 tablet, Refills: 0      epoetin vikki (PROCRIT) 10,000 unit/mL injection Inject 1 mL (10,000 Units total) into the skin every Mon, Wed, Fri. To be given with HD      furosemide (LASIX) 80 MG tablet Take 1 tablet (80 mg total) by mouth 2 (two) times daily.  Qty: 180 tablet, Refills: 0      HYDROmorphone (DILAUDID) 2 MG tablet Take 1 tablet (2 mg total) by mouth every 4 (four) hours as needed.  Qty: 6 tablet, Refills: 0      hydrOXYzine HCl (ATARAX) 25 MG tablet Take 1 tablet (25 mg total) by mouth 3 (three) times daily as needed for Itching.  Qty: 60 tablet, Refills: 0      isosorbide mononitrate (IMDUR) 30 MG 24 hr tablet Take 1 tablet (30 mg total) by mouth once daily.  Qty: 90 tablet, Refills: 0      lisinopril (PRINIVIL,ZESTRIL) 20 MG tablet Take 1 tablet (20 mg total) by mouth once daily.  Qty: 90 tablet, Refills: 0      metoprolol succinate (TOPROL-XL) 100 MG 24 hr tablet Take two and half tablets daily  Qty: 270 tablet, Refills: 3         CONTINUE these medications which have NOT CHANGED    Details   ascorbic acid, vitamin C, (VITAMIN C) 500 MG tablet Take 1 tablet (500 mg total) by mouth 2 (two) times daily.      docusate sodium (COLACE) 100 MG capsule Take 1 capsule (100 mg total) by mouth 2 (two) times daily.  Refills: 0      hydrALAZINE (APRESOLINE) 100 MG tablet TAKE ONE TABLET BY MOUTH THREE TIMES DAILY FOR  HYPERTENSION  Qty: 90 tablet, Refills: 1      omega-3 fatty acids-vitamin E (FISH OIL) 1,000 mg Cap Take 1 capsule by mouth once daily.  Refills: 0      oxycodone (ROXICODONE) 5 MG immediate release tablet Take 1 tablet (5 mg total) by mouth every 4 (four) hours as needed.  Qty: 60 tablet, Refills: 0      sevelamer carbonate (RENVELA) 800 mg Tab Take 1 tablet (800 mg total) by mouth 3 (three) times  daily with meals.  Qty: 90 tablet, Refills: 11      warfarin (COUMADIN) 7.5 MG tablet Take 1 tablet (7.5 mg total) by mouth Daily.  Qty: 30 tablet, Refills: 11         STOP taking these medications       aspirin (ECOTRIN) 81 MG EC tablet Comments:   Reason for Stopping:         cloNIDine (CATAPRES) 0.1 MG tablet Comments:   Reason for Stopping:         labetalol (NORMODYNE) 300 MG tablet Comments:   Reason for Stopping:         sildenafil (REVATIO) 20 mg Tab Comments:   Reason for Stopping:             Time spent on the discharge of patient: 50 minutes    Kika Knox NP  Department of Hospital Medicine  [facility]

## 2017-03-19 NOTE — ASSESSMENT & PLAN NOTE
- IS/flutter, cough deep breath/ heart pillow given, encouraged oob  - continue to monitor with diuresis and dialysis, no distress or shortness of breath   - pulmonology performed thoracentesis (3/10) with 1500ml serosang/ sang tap  - CXR 3/14 with small amount of pleural effusion noted on right, left lung clear  - exam appears to have reaccumilated  - patient not using, IS, not getting out of bed  - fluid analysis:  Amylase, Fluid     114       Glucose, Fluid     90      LD, Fluid     250       Mesothelial cells, Fluid     1     Body Fluid Culture No growth after 5 days.     Body Fluid, Protein     4.3     LD, Fluid Not established U/L 250     Glucose, Fluid Not established mg/dL 90     Amylase, Fluid Not established U/L 114     Fluid Color     Red       Fluid Appearance     Bloody      Body Fluid Type     Pleur...      WBC, Body Fluid     740      Segs, Fluid     17      Lymphs, Fluid     48      Monocytes/Macrophages, Fluid     31      Eos, Fluid     3       LD serum    249     Total Protein serum    7.7     * Exudative effusion

## 2017-03-22 ENCOUNTER — TELEPHONE (OUTPATIENT)
Dept: SURGERY | Facility: HOSPITAL | Age: 47
End: 2017-03-22

## 2017-03-22 NOTE — TELEPHONE ENCOUNTER
"Patient called noting blood pressure reading of 197/117 at home. He stated he was sleeping comfortably until he was woken up by a head ache and generally "feeling bad". He denied n/v and chest pain. Patient was urged to present to the nearest Emergency Department as soon as possible and the potential consequences of not doing so were noted. Patient stated he would like to monitor his blood pressure at home for a little while longer and would present to the nearest Emergency Department if his BP did not decreased. At this point, the patient was urged again to just present to the nearest Emergency Department as soon as possible.    Aishwarya Blanco MD  PGY-1  Surgery   "

## 2017-05-01 ENCOUNTER — TELEPHONE (OUTPATIENT)
Dept: TRANSPLANT | Facility: CLINIC | Age: 47
End: 2017-05-01

## 2017-06-05 ENCOUNTER — HOSPITAL ENCOUNTER (OUTPATIENT)
Dept: CARDIOLOGY | Facility: CLINIC | Age: 47
Discharge: HOME | End: 2017-06-05
Payer: MEDICAID

## 2017-06-05 ENCOUNTER — OFFICE VISIT (OUTPATIENT)
Dept: ELECTROPHYSIOLOGY | Facility: CLINIC | Age: 47
End: 2017-06-05
Payer: MEDICAID

## 2017-06-05 VITALS
HEART RATE: 87 BPM | DIASTOLIC BLOOD PRESSURE: 88 MMHG | HEIGHT: 74 IN | WEIGHT: 183.63 LBS | BODY MASS INDEX: 23.57 KG/M2 | SYSTOLIC BLOOD PRESSURE: 168 MMHG

## 2017-06-05 DIAGNOSIS — Q24.9 ADULT CONGENITAL HEART DISEASE: ICD-10-CM

## 2017-06-05 DIAGNOSIS — J90 PLEURAL EFFUSION: Primary | ICD-10-CM

## 2017-06-05 DIAGNOSIS — N18.5 CKD STAGE 5 SECONDARY TO HYPERTENSION: ICD-10-CM

## 2017-06-05 DIAGNOSIS — I35.0 NONRHEUMATIC AORTIC VALVE STENOSIS: ICD-10-CM

## 2017-06-05 DIAGNOSIS — Z95.2 S/P AVR: ICD-10-CM

## 2017-06-05 DIAGNOSIS — I47.19 ATRIAL TACHYCARDIA: ICD-10-CM

## 2017-06-05 DIAGNOSIS — Z95.2 S/P AVR (AORTIC VALVE REPLACEMENT): ICD-10-CM

## 2017-06-05 DIAGNOSIS — Z98.890 HISTORY OF RADIOFREQUENCY ABLATION FOR COMPLEX RIGHT ATRIAL ARRHYTHMIA: ICD-10-CM

## 2017-06-05 DIAGNOSIS — I11.0 HYPERTENSIVE CARDIOMEGALY WITH HEART FAILURE: ICD-10-CM

## 2017-06-05 DIAGNOSIS — I42.8 NONISCHEMIC CARDIOMYOPATHY: ICD-10-CM

## 2017-06-05 DIAGNOSIS — I12.0 CKD STAGE 5 SECONDARY TO HYPERTENSION: ICD-10-CM

## 2017-06-05 DIAGNOSIS — N18.6 ESRD (END STAGE RENAL DISEASE): ICD-10-CM

## 2017-06-05 PROBLEM — I47.10 SVT (SUPRAVENTRICULAR TACHYCARDIA): Status: RESOLVED | Noted: 2017-03-09 | Resolved: 2017-06-05

## 2017-06-05 PROCEDURE — 99999 PR PBB SHADOW E&M-EST. PATIENT-LVL IV: CPT | Mod: PBBFAC,,, | Performed by: INTERNAL MEDICINE

## 2017-06-05 PROCEDURE — 93005 ELECTROCARDIOGRAM TRACING: CPT | Mod: PBBFAC | Performed by: INTERNAL MEDICINE

## 2017-06-05 PROCEDURE — 99214 OFFICE O/P EST MOD 30 MIN: CPT | Mod: PBBFAC,25 | Performed by: INTERNAL MEDICINE

## 2017-06-05 PROCEDURE — 99214 OFFICE O/P EST MOD 30 MIN: CPT | Mod: S$PBB,,, | Performed by: INTERNAL MEDICINE

## 2017-06-05 PROCEDURE — 93010 ELECTROCARDIOGRAM REPORT: CPT | Mod: S$PBB,,, | Performed by: INTERNAL MEDICINE

## 2017-06-05 RX ORDER — CALCIUM ACETATE 667 MG/1
667 CAPSULE ORAL 3 TIMES DAILY
COMMUNITY
End: 2018-05-03 | Stop reason: SDUPTHER

## 2017-06-05 NOTE — PROGRESS NOTES
Subjective:     HPI    I had the pleasure of seeing Isidro White in follow-up for his history of SVT and recent ablation. He is a 47-year old male with a history of ASD closure at age 8, ESRD on HD, and HTN, who underwent mechanical AVR and TV repair in 2/2017. He was admitted to [facility] in 3/2017 with palpitations. Mr. White underwent EP study, where an AT was easily induced, mapped to the 10 o'clock aspect of the TV annulus, and successfully ablated. He was also anemic during this admission, requiring PRBC transfusion, and had pleural effusions which required thoracentesis.    Mr. White tells me that although he is sometimes tachycardic at dialysis, he is overall feeling well and has had no recurrent palpitations.    An echo performed during that admission showed an EF of 30-35%, and an increased density to the myocardium, suggesting a myocardial infiltrative process.    I reviewed today's ECG tracing, which shows sinsu rhythm at 87 bpm, and an IVCD with QRS duration of 124 ms.    Review of Systems   Constitution: Positive for malaise/fatigue. Negative for decreased appetite, weight gain and weight loss.   HENT: Negative for sore throat.    Eyes: Negative for blurred vision.   Cardiovascular: Positive for dyspnea on exertion. Negative for chest pain, irregular heartbeat, leg swelling, near-syncope, orthopnea, palpitations, paroxysmal nocturnal dyspnea and syncope.   Respiratory: Negative for shortness of breath.    Skin: Negative for rash.   Musculoskeletal: Negative for arthritis.   Gastrointestinal: Negative for abdominal pain.   Neurological: Negative for focal weakness.   Psychiatric/Behavioral: Negative for altered mental status.        Objective:    Physical Exam   Constitutional: He is oriented to person, place, and time. He appears well-developed and well-nourished. No distress.   HENT:   Head: Normocephalic and atraumatic.   Mouth/Throat: Oropharynx is clear and moist.   Eyes: Pupils are equal,  round, and reactive to light. No scleral icterus.   Neck: Neck supple. No thyromegaly present.   Cardiovascular: Regular rhythm, normal heart sounds and normal pulses.  Exam reveals no gallop and no friction rub.    No murmur heard.  Pulmonary/Chest: Effort normal and breath sounds normal. He has no rales.   Abdominal: Soft. Bowel sounds are normal. He exhibits no distension. There is no tenderness.   Musculoskeletal: He exhibits no edema.   Neurological: He is alert and oriented to person, place, and time.   Skin: Skin is warm and dry. No rash noted.   Psychiatric: He has a normal mood and affect. His behavior is normal.   Vitals reviewed.        Assessment:       1. Pleural effusion    2. Atrial tachycardia    3. History of radiofrequency ablation for complex right atrial arrhythmia    4. Hypertensive cardiomegaly with heart failure    5. Nonrheumatic aortic valve stenosis    6. Adult congenital heart disease    7. Nonischemic cardiomyopathy    8. CKD stage 5 secondary to hypertension    9. ESRD (end stage renal disease)    10. S/P AVR (aortic valve replacement)         Plan:   In summary, Isidro White is a 47 year old male with a complex cardiac history who has ESRD on HD, mechanical AVR and TV repair, nonischemic cardiomyopathy, recent TV annular AT ablation. The plan is as follows:    1) Repeat echo to assess EF. Pt may need an ICD. Additionally, if there is still evidence of a myocardial infiltrative process, I will recommend cardiac MRI.  2) Establish care with [facility] cardiology.  3) Follow-up with me in 1 year or sooner if EF remains <=35% on upcoming echo.    Thank you for allowing me to participate in the care of this patient. Please do not hesitate to call me with any questions or concerns.

## 2017-06-12 ENCOUNTER — TELEPHONE (OUTPATIENT)
Dept: ELECTROPHYSIOLOGY | Facility: CLINIC | Age: 47
End: 2017-06-12

## 2017-06-12 NOTE — TELEPHONE ENCOUNTER
----- Message from Nigel Guallpa MD sent at 6/12/2017  7:24 AM CDT -----  Contact: Rosalba/Dr Jacquelyn Skelton  Yes he definitely should not be on two beta blockers--that could be dangerous.    GP  ----- Message -----  From: Nel Black RN  Sent: 6/9/2017   3:05 PM  To: MD Dr Costa Marroquin's office called and stated that this Pt was seem and adjustments were made to his medications. Labetolol was discontinued but the Pt refused to stop it. Dr Skelton is now calling wanting us to call Pt and see if we can get him to stop the labetalol. Is this something you agree with the Pt stopping before I call him??  ----- Message -----  From: Gabriele Lan  Sent: 6/9/2017  10:54 AM  To: Nel Black RN    Please call Rosalba at [phone] or [phone] if any questions. Dr Skelton instructed patient this morning to stop Labetolol 300 mg and continue taking Metoprolol  mg but he refused. Please call pt with medication updates. Dr WILLIE Guallpa    Thank you

## 2017-06-20 ENCOUNTER — OFFICE VISIT (OUTPATIENT)
Dept: CARDIOLOGY | Facility: CLINIC | Age: 47
End: 2017-06-20
Payer: MEDICAID

## 2017-06-20 ENCOUNTER — HOSPITAL ENCOUNTER (OUTPATIENT)
Dept: CARDIOLOGY | Facility: CLINIC | Age: 47
Discharge: HOME | End: 2017-06-20
Payer: MEDICAID

## 2017-06-20 ENCOUNTER — TELEPHONE (OUTPATIENT)
Dept: ELECTROPHYSIOLOGY | Facility: CLINIC | Age: 47
End: 2017-06-20

## 2017-06-20 VITALS
HEART RATE: 62 BPM | WEIGHT: 180.31 LBS | BODY MASS INDEX: 23.14 KG/M2 | HEIGHT: 74 IN | DIASTOLIC BLOOD PRESSURE: 80 MMHG | SYSTOLIC BLOOD PRESSURE: 158 MMHG

## 2017-06-20 DIAGNOSIS — I10 UNCONTROLLED HYPERTENSION: ICD-10-CM

## 2017-06-20 DIAGNOSIS — N18.6 ESRD (END STAGE RENAL DISEASE): ICD-10-CM

## 2017-06-20 DIAGNOSIS — Z98.890 HISTORY OF RADIOFREQUENCY ABLATION FOR COMPLEX RIGHT ATRIAL ARRHYTHMIA: ICD-10-CM

## 2017-06-20 DIAGNOSIS — I35.0 NONRHEUMATIC AORTIC VALVE STENOSIS: ICD-10-CM

## 2017-06-20 DIAGNOSIS — Q24.9 ADULT CONGENITAL HEART DISEASE: ICD-10-CM

## 2017-06-20 DIAGNOSIS — I42.8 NONISCHEMIC CARDIOMYOPATHY: ICD-10-CM

## 2017-06-20 DIAGNOSIS — I50.42 CHRONIC COMBINED SYSTOLIC AND DIASTOLIC HEART FAILURE: Primary | ICD-10-CM

## 2017-06-20 DIAGNOSIS — Z95.2 S/P AVR (AORTIC VALVE REPLACEMENT): ICD-10-CM

## 2017-06-20 DIAGNOSIS — I47.19 ATRIAL TACHYCARDIA: ICD-10-CM

## 2017-06-20 LAB
DIASTOLIC DYSFUNCTION: YES
ESTIMATED PA SYSTOLIC PRESSURE: 67.9
GLOBAL PERICARDIAL EFFUSION: ABNORMAL
RETIRED EF AND QEF - SEE NOTES: 55 (ref 55–65)
TRICUSPID VALVE REGURGITATION: ABNORMAL

## 2017-06-20 PROCEDURE — 99999 PR PBB SHADOW E&M-EST. PATIENT-LVL III: CPT | Mod: PBBFAC,,, | Performed by: INTERNAL MEDICINE

## 2017-06-20 PROCEDURE — 99214 OFFICE O/P EST MOD 30 MIN: CPT | Mod: S$PBB,,, | Performed by: INTERNAL MEDICINE

## 2017-06-20 PROCEDURE — 93306 TTE W/DOPPLER COMPLETE: CPT | Mod: PBBFAC | Performed by: INTERNAL MEDICINE

## 2017-06-20 PROCEDURE — 0399T 2D ECHO WITH COLOR FLOW DOPPLER: CPT | Mod: S$PBB,,, | Performed by: INTERNAL MEDICINE

## 2017-06-20 RX ORDER — AMLODIPINE BESYLATE 5 MG/1
5 TABLET ORAL DAILY
COMMUNITY
End: 2017-06-20 | Stop reason: SDUPTHER

## 2017-06-20 RX ORDER — AMLODIPINE BESYLATE 10 MG/1
10 TABLET ORAL DAILY
Qty: 90 TABLET | Refills: 3 | Status: SHIPPED | OUTPATIENT
Start: 2017-06-20 | End: 2018-03-09 | Stop reason: SDUPTHER

## 2017-06-20 NOTE — PROGRESS NOTES
Subjective:   Patient ID:  Isidro White is a 47 y.o. male who presents for evaluation of Congestive Heart Failure      HPI: He presents today to establish care. He has a history of ASD closure at age 8. He has a NICM and underwent an AVR with a 22 mm mechanical valve and TV annuloplasty with a 28 mm ring 3/17. He has a long standing history of HTN and has been on HD since his surgery. He is being evaluated for kidney transplant. His most recent echo was 3/17 and showed biventricular dysfunction with an LVEF of 33% and moderate to severe RV dysfunction. Biventricular hypertrophy was noted as well as increased myocardial density which may be consistent with an infiltrative process. The mean gradient across his AV was 12. Mild to moderate TR was present. He has been adjusting to life on HD. He reports ED and decreased labido. Isidro White denies any chest pain, shortness of breath, PND, orthopnea, palpitations, leg edema, or syncope. His lisinopril was recently increased to 40 mg by his nephrologist. His dry weight is 78 kg.    ECG: (6/5/17); NSR with IVCD    Past Medical History:   Diagnosis Date    Cardiomyopathy     LVEF 33%    ESRD due to hypertension     HD M, W, F    Hypertension     Stenosis of aortic and mitral valves     AS    Supraventricular tachycardia     atrial tachycardia    Tachycardia     Valvular regurgitation     AI, TR       Past Surgical History:   Procedure Laterality Date    ASD REPAIR      age 8 [facility]    CARDIAC [facility]HETERIZATION      [facility]     CARDIAC VALVE SURGERY  02/2017    22 mm Medtronic AV, 28 mm TV Medtronic annuloplaty ring    RADIOFREQUENCY ABLATION  03/2017    Atrial tachycardia       Social History     Social History    Marital status: Single     Spouse name: N/A    Number of children: N/A    Years of education: N/A     Social History Main Topics    Smoking status: Never Smoker    Smokeless tobacco: None    Alcohol use No      Comment:  no longer drinks since surgery    Drug use: No    Sexual activity: Not Asked     Other Topics Concern    None     Social History Narrative    None       Family History   Problem Relation Age of Onset    Cancer Mother     Heart attack Father        Patient's Medications   New Prescriptions    No medications on file   Previous Medications    ACETAMINOPHEN (TYLENOL) 325 MG TABLET    Take 2 tablets (650 mg total) by mouth every 8 (eight) hours as needed.    ASCORBIC ACID, VITAMIN C, (VITAMIN C) 500 MG TABLET    Take 1 tablet (500 mg total) by mouth 2 (two) times daily.    CALCIUM ACETATE (PHOSLO) 667 MG CAPSULE    Take 667 mg by mouth 3 (three) times daily.    COLCHICINE 0.6 MG TABLET    Take a half tablet every 7 days.    DOCUSATE SODIUM (COLACE) 100 MG CAPSULE    Take 1 capsule (100 mg total) by mouth 2 (two) times daily.    EPOETIN TORRIE (PROCRIT) 10,000 UNIT/ML INJECTION    Inject 1 mL (10,000 Units total) into the skin every Mon, Wed, Fri. To be given with HD    FUROSEMIDE (LASIX) 80 MG TABLET    Take 1 tablet (80 mg total) by mouth 2 (two) times daily.    HYDRALAZINE (APRESOLINE) 100 MG TABLET    TAKE ONE TABLET BY MOUTH THREE TIMES DAILY FOR  HYPERTENSION    HYDROMORPHONE (DILAUDID) 2 MG TABLET    Take 1 tablet (2 mg total) by mouth every 4 (four) hours as needed.    HYDROXYZINE HCL (ATARAX) 25 MG TABLET    Take 1 tablet (25 mg total) by mouth 3 (three) times daily as needed for Itching.    ISOSORBIDE MONONITRATE (IMDUR) 30 MG 24 HR TABLET    Take 1 tablet (30 mg total) by mouth once daily.    LISINOPRIL (PRINIVIL,ZESTRIL) 20 MG TABLET    Take 1 tablet (20 mg total) by mouth once daily.    METOPROLOL SUCCINATE (TOPROL-XL) 100 MG 24 HR TABLET    Take two and half tablets daily    OMEGA-3 FATTY ACIDS-VITAMIN E (FISH OIL) 1,000 MG CAP    Take 1 capsule by mouth once daily.    OXYCODONE (ROXICODONE) 5 MG IMMEDIATE RELEASE TABLET    Take 1 tablet (5 mg total) by mouth every 4 (four) hours as needed.    WARFARIN  "(COUMADIN) 7.5 MG TABLET    Take 1 tablet (7.5 mg total) by mouth Daily.   Modified Medications    Modified Medication Previous Medication    AMLODIPINE (NORVASC) 10 MG TABLET amlodipine (NORVASC) 5 MG tablet       Take 1 tablet (10 mg total) by mouth once daily.    Take 5 mg by mouth once daily.   Discontinued Medications    SEVELAMER CARBONATE (RENVELA) 800 MG TAB    Take 1 tablet (800 mg total) by mouth 3 (three) times daily with meals.       Review of Systems   Constitution: Negative for weakness, malaise/fatigue and weight gain.   HENT: Negative for headaches and hearing loss.    Eyes: Negative for visual disturbance.   Cardiovascular: Negative for chest pain, claudication, dyspnea on exertion, leg swelling, near-syncope, orthopnea, palpitations, paroxysmal nocturnal dyspnea and syncope.   Respiratory: Negative for cough, shortness of breath, sleep disturbances due to breathing, snoring and wheezing.    Endocrine: Negative for cold intolerance, heat intolerance, polydipsia, polyphagia and polyuria.   Hematologic/Lymphatic: Negative for bleeding problem. Does not bruise/bleed easily.   Skin: Negative for rash and suspicious lesions.   Musculoskeletal: Negative for arthritis, falls, joint pain, muscle weakness and myalgias.   Gastrointestinal: Negative for abdominal pain, change in bowel habit, constipation, diarrhea, heartburn, hematochezia, melena and nausea.   Genitourinary: Negative for hematuria and nocturia.        ED   Neurological: Negative for excessive daytime sleepiness, dizziness, light-headedness and loss of balance.   Psychiatric/Behavioral: Positive for depression. The patient is not nervous/anxious.    Allergic/Immunologic: Negative for environmental allergies.       BP (!) 158/80   Pulse 62   Ht 6' 2" (1.88 m)   Wt 81.8 kg (180 lb 5.4 oz)   BMI 23.15 kg/m²     Objective:   Physical Exam   Constitutional: He is oriented to person, place, and time. He appears well-developed and well-nourished. "        HENT:   Head: Normocephalic and atraumatic.   Mouth/Throat: Oropharynx is clear and moist.   Eyes: Conjunctivae and EOM are normal. Pupils are equal, round, and reactive to light. No scleral icterus.   Neck: Normal range of motion. Neck supple. No hepatojugular reflux and no JVD present. No tracheal deviation present. No thyromegaly present.   Cardiovascular: Normal rate, regular rhythm and intact distal pulses.  PMI is not displaced.    Murmur heard.   Holosystolic murmur is present with a grade of 2/6  at the lower left sternal border AV fistula present in left arm with palpable thrill.  Pulses:       Carotid pulses are 2+ on the right side, and 2+ on the left side.       Radial pulses are 2+ on the right side, and 2+ on the left side.        Dorsalis pedis pulses are 2+ on the right side, and 2+ on the left side.        Posterior tibial pulses are 2+ on the right side, and 2+ on the left side.   Pulmonary/Chest: Effort normal and breath sounds normal.   Abdominal: Soft. Bowel sounds are normal. He exhibits no distension and no mass. There is no hepatosplenomegaly. There is no tenderness.   Musculoskeletal: He exhibits no edema or tenderness.   Lymphadenopathy:     He has no cervical adenopathy.   Neurological: He is alert and oriented to person, place, and time.   Skin: Skin is warm and dry. No rash noted. No cyanosis or erythema. Nails show no clubbing.   Psychiatric: He has a normal mood and affect. His speech is normal and behavior is normal.       Lab Results   Component Value Date     (L) 03/19/2017    K 4.4 03/19/2017    CL 99 03/19/2017    CO2 25 03/19/2017    BUN 57 (H) 03/19/2017    CREATININE 6.4 (H) 03/19/2017    GLU 87 03/19/2017    MG 2.1 03/19/2017    AST 15 03/19/2017    ALT 6 (L) 03/19/2017    ALBUMIN 2.1 (L) 03/19/2017    PROT 7.9 03/19/2017    BILITOT 0.7 03/19/2017    WBC 8.09 03/19/2017    HGB 8.9 (L) 03/19/2017    HCT 26.2 (L) 03/19/2017    HCT 25 (L) 02/08/2017    MCV 89  03/19/2017     03/19/2017    INR 1.9 (H) 03/19/2017    CHOL 215 (H) 02/26/2012    HDL 26 (L) 02/26/2012    LDLCALC 153.0 02/26/2012    TRIG 182 (H) 02/26/2012    BNP 1,292 (H) 03/09/2017       Assessment:     1. Chronic combined systolic and diastolic heart failure : He appears euvolemic today. He is on a good heart failure regimen. He is scheduled for a repeat echo today. I have ordered an amyloid panel given the increased myocardial density seen on his echo and renal failure. He declined fat pad biopsy. We will discuss at his next visit. If LVEF remains < 35% will discuss ICD placement. He may need cardiac MRI prior to ICD depending on the results of his amyloid panel.   2. Atrial tachycardia : s/p ablation. No recurrence.    3. Adult congenital heart disease : s/p ASD repair at age 8.   4. Nonischemic cardiomyopathy    5. Nonrheumatic aortic valve stenosis    6. Uncontrolled hypertension : Blood pressure is above goal. Increase amlodipine to 10 mg daily.    7. ESRD (end stage renal disease)    8. S/P AVR (aortic valve replacement) : SBE prophylaxis is recommended prior to dental procedures. Continue coumadin. His INR is followed by his PCP.    9. History of radiofrequency ablation for complex right atrial arrhythmia        Plan:     Isidro Blake Trinidad was seen today for congestive heart failure.    Diagnoses and all orders for this visit:    Chronic combined systolic and diastolic heart failure  -     amlodipine (NORVASC) 10 MG tablet; Take 1 tablet (10 mg total) by mouth once daily.  -     IMMUNOGLOBULIN FREE LT CHAINS BLOOD; Future  -     PROTEIN ELECTROPHORESIS, SERUM; Future  -     Immunofixation electrophoresis; Future    Atrial tachycardia  -     amlodipine (NORVASC) 10 MG tablet; Take 1 tablet (10 mg total) by mouth once daily.  -     IMMUNOGLOBULIN FREE LT CHAINS BLOOD; Future  -     PROTEIN ELECTROPHORESIS, SERUM; Future    Adult congenital heart disease  -     amlodipine (NORVASC) 10 MG tablet; Take 1  tablet (10 mg total) by mouth once daily.  -     IMMUNOGLOBULIN FREE LT CHAINS BLOOD; Future  -     PROTEIN ELECTROPHORESIS, SERUM; Future    Nonischemic cardiomyopathy  -     amlodipine (NORVASC) 10 MG tablet; Take 1 tablet (10 mg total) by mouth once daily.  -     IMMUNOGLOBULIN FREE LT CHAINS BLOOD; Future  -     PROTEIN ELECTROPHORESIS, SERUM; Future  -     2D echo with color flow doppler; Future    Nonrheumatic aortic valve stenosis  -     amlodipine (NORVASC) 10 MG tablet; Take 1 tablet (10 mg total) by mouth once daily.  -     IMMUNOGLOBULIN FREE LT CHAINS BLOOD; Future  -     PROTEIN ELECTROPHORESIS, SERUM; Future    Uncontrolled hypertension  -     amlodipine (NORVASC) 10 MG tablet; Take 1 tablet (10 mg total) by mouth once daily.  -     IMMUNOGLOBULIN FREE LT CHAINS BLOOD; Future  -     PROTEIN ELECTROPHORESIS, SERUM; Future    ESRD (end stage renal disease)  -     amlodipine (NORVASC) 10 MG tablet; Take 1 tablet (10 mg total) by mouth once daily.  -     IMMUNOGLOBULIN FREE LT CHAINS BLOOD; Future  -     PROTEIN ELECTROPHORESIS, SERUM; Future    S/P AVR (aortic valve replacement)  -     amlodipine (NORVASC) 10 MG tablet; Take 1 tablet (10 mg total) by mouth once daily.  -     IMMUNOGLOBULIN FREE LT CHAINS BLOOD; Future  -     PROTEIN ELECTROPHORESIS, SERUM; Future    History of radiofrequency ablation for complex right atrial arrhythmia  -     amlodipine (NORVASC) 10 MG tablet; Take 1 tablet (10 mg total) by mouth once daily.  -     IMMUNOGLOBULIN FREE LT CHAINS BLOOD; Future  -     PROTEIN ELECTROPHORESIS, SERUM; Future        Thank you for allowing me to participate in this patient's care. Please do not hesitate to contact me with any questions or concerns.

## 2017-06-20 NOTE — LETTER
June 20, 2017      Nigel Guallpa MD  [address] [address]           [facility]  [address]  [facility] [address]  Phone: [phone]          Patient: Isidro White   MR Number: [id number]   YOB: 1970   Date of Visit: 6/20/2017       Dear Dr. Nigel Guallpa:    Thank you for referring Isidro White to me for evaluation. Attached you will find relevant portions of my assessment and plan of care.    If you have questions, please do not hesitate to call me. I look forward to following Isidro White along with you.    Sincerely,    Zulema Guallpa MD    Enclosure  CC:  No Recipients    If you would like to receive this communication electronically, please contact [email or web address] or [phone] to request more information on [employer] access.    For providers and/or their staff who would like to refer a patient to [facility], please contact us through our one-stop-shop provider referral line, [facility], at [phone].    If you feel you have received this communication in error or would no longer like to receive these types of communications, please e-mail [email or web address]

## 2017-06-20 NOTE — TELEPHONE ENCOUNTER
LM for pt to RTC  ----- Message from Nigel Guallpa MD sent at 6/20/2017  2:39 PM CDT -----  This pt had a normal echo. Please let him know. He can keep seeing Dr. Zulema Guallpa and can see me as needed.

## 2017-06-22 ENCOUNTER — TELEPHONE (OUTPATIENT)
Dept: HEMATOLOGY/ONCOLOGY | Facility: CLINIC | Age: 47
End: 2017-06-22

## 2017-06-22 ENCOUNTER — TELEPHONE (OUTPATIENT)
Dept: CARDIOLOGY | Facility: CLINIC | Age: 47
End: 2017-06-22

## 2017-06-22 NOTE — TELEPHONE ENCOUNTER
----- Message from Zulema Guallpa MD sent at 6/22/2017  8:12 AM CDT -----  Free light chains are elevated. Please schedule him to see Dr Bernabe for further evaluation.

## 2017-06-30 ENCOUNTER — CONFERENCE (OUTPATIENT)
Dept: TRANSPLANT | Facility: CLINIC | Age: 47
End: 2017-06-30

## 2017-06-30 NOTE — TELEPHONE ENCOUNTER
Patient was discussed in selection committee on 6/30/17 to see if patient could be re-referred. Patient's EF is still too low for transplant and will require treatment for heart failure and have an EF of 40 or above to be re-referred.

## 2017-07-07 RX ORDER — FUROSEMIDE 80 MG/1
80 TABLET ORAL 2 TIMES DAILY
Qty: 180 TABLET | Refills: 3 | Status: SHIPPED | OUTPATIENT
Start: 2017-07-07 | End: 2018-08-31 | Stop reason: SDUPTHER

## 2017-07-28 ENCOUNTER — TELEPHONE (OUTPATIENT)
Dept: CARDIOLOGY | Facility: CLINIC | Age: 47
End: 2017-07-28

## 2017-07-28 ENCOUNTER — TELEPHONE (OUTPATIENT)
Dept: NEPHROLOGY | Facility: CLINIC | Age: 47
End: 2017-07-28

## 2017-07-28 ENCOUNTER — TELEPHONE (OUTPATIENT)
Dept: ELECTROPHYSIOLOGY | Facility: CLINIC | Age: 47
End: 2017-07-28

## 2017-07-28 NOTE — TELEPHONE ENCOUNTER
Returned call, left voice message for pharmacist advising to change nexium to prevacid for insurance purpose per Dr. EUGENIE Ivey.

## 2017-07-28 NOTE — TELEPHONE ENCOUNTER
Cialis is contra-indicated because he is on isosorbide for his cardiomyopathy and CHF. I believe we discussed this at his visit.

## 2017-07-28 NOTE — TELEPHONE ENCOUNTER
----- Message from Bonifacio Hudson MA sent at 7/28/2017  9:35 AM CDT -----  Contact: Sanjuana [phone]  Sanjuana calling to get clearance for pt to start taking Cialis.  ----- Message -----  From: Nelly Burnham  Sent: 7/28/2017   9:05 AM  To: Saloni Manning Staff    Sanjuana asks that the nurse call her at [phone] about questions she has regarding patient.

## 2017-07-28 NOTE — TELEPHONE ENCOUNTER
----- Message from Sukumar Sanz sent at 7/28/2017  2:18 PM CDT -----  Contact: Mohit or snehal Henley/ [facility]  He's calling in regards to the Rx called into the pharmacy for Nexium, pt's INS is request Prevacid instead, please advise, ph# [phone]

## 2017-07-28 NOTE — TELEPHONE ENCOUNTER
Spoke with Sanjuana and advised she would have to speak to his cardiologist about the Pt starting Cialis. She voiced understanding.

## 2017-07-28 NOTE — TELEPHONE ENCOUNTER
----- Message from Yina Clark sent at 7/28/2017 12:11 PM CDT -----  Contact: Jojo from [facility]   Jojo from [facility] called, and will like to get the approval for pt take start RX cyalis. Pt of Dr. Guallpa and LOV 6/20/17. Ph [phone]. Thank you

## 2017-07-28 NOTE — TELEPHONE ENCOUNTER
Jojo Markham, nurse with [facility] [[phone]] is calling, says that the pt's nephrologist, Dr. Hurst will prescribe Cialis for the pt if you give the approval for the pt take it. Please advise. Thanks, Dot

## 2017-08-02 ENCOUNTER — TELEPHONE (OUTPATIENT)
Dept: CARDIOLOGY | Facility: CLINIC | Age: 47
End: 2017-08-02

## 2017-08-02 NOTE — TELEPHONE ENCOUNTER
----- Message from Nadja Dela Cruz sent at 8/2/2017  9:16 AM CDT -----  Contact: Alfa Cline [phone]   Np called wanting to ask Dr. Guallpa if it's okay for the patient to take Cialis 20 mg Prn Ed and she would tell the pt to take a 1/2 tab. He wants something for erectile dysfunction.    Thanks

## 2017-08-02 NOTE — TELEPHONE ENCOUNTER
Spoke with NP Ms Yoon [[phone]] and gave her Dr. Sellers's message regarding the use of Cialis and she verbalized understanding. Please see note from 7-28-17.

## 2017-08-04 ENCOUNTER — TELEPHONE (OUTPATIENT)
Dept: CARDIOLOGY | Facility: CLINIC | Age: 47
End: 2017-08-04

## 2017-08-04 NOTE — TELEPHONE ENCOUNTER
----- Message from Yina Clark sent at 8/4/2017  4:17 PM CDT -----  Contact: pt called  Pt called, states he has been awaiting a call and will like a call today. Pt is upset and will like to be reached at ph [phone].pt of Dr. Guallpa and LOV 6/20/17. Thank you

## 2017-08-04 NOTE — TELEPHONE ENCOUNTER
Dr. Guallpa, The pt says that he would like you to call him. Says he doesn't want to let me know what the call is about - says that you know.  Home number is [phone].  Thanks, Dot

## 2017-08-04 NOTE — TELEPHONE ENCOUNTER
----- Message from Otilia Suárez MA sent at 8/4/2017 12:48 PM CDT -----  Contact: patient called  Dot please call the patient at home he need to talk to you about his medications. Last visit was on 6-. . Thank you.

## 2017-08-07 ENCOUNTER — TELEPHONE (OUTPATIENT)
Dept: CARDIOLOGY | Facility: CLINIC | Age: 47
End: 2017-08-07

## 2017-08-07 DIAGNOSIS — N52.2 DRUG-INDUCED ERECTILE DYSFUNCTION: ICD-10-CM

## 2017-08-07 RX ORDER — TADALAFIL 5 MG/1
5 TABLET ORAL
Qty: 30 TABLET | Refills: 11 | Status: SHIPPED | OUTPATIENT
Start: 2017-08-07 | End: 2017-11-13 | Stop reason: SDUPTHER

## 2017-08-07 NOTE — TELEPHONE ENCOUNTER
Spoke with the pharmacist Kale at [facility]. Read Dr. Guallpa's note regarding Cialis written today - and he verbalized understanding.

## 2017-08-07 NOTE — TELEPHONE ENCOUNTER
----- Message from Otilia Suárez MA sent at 8/7/2017  2:39 PM CDT -----  Contact: patient called  Dot please call the patient at  [phone] he would like to ask you a question about his medication Cialis.  Last visit was on 6- . Thank you.

## 2017-08-07 NOTE — TELEPHONE ENCOUNTER
Mr Cindy called regarding erectile dysfunction and requesting cialis. Given his last LVEF had normalized, I will stop his isosorbide and he can use cilasis on an as needed basis. We reviewed that he should not take isosorbide or NTG with cialis as it can cause life threatening hypotension. He can use no more than 5 mg every 72 hours due to his kidney function.

## 2017-08-14 ENCOUNTER — TELEPHONE (OUTPATIENT)
Dept: TRANSPLANT | Facility: CLINIC | Age: 47
End: 2017-08-14

## 2017-08-14 NOTE — TELEPHONE ENCOUNTER
----- Message from Gus Hurst MD sent at 8/14/2017 10:30 AM CDT -----  Sure, we will try to challenge him with fluid removal as tolerated at dialysis     Gus     ----- Message -----  From: Ryan Carroll RN  Sent: 8/14/2017   9:58 AM  To: Tomas Rowland MD, MD Dr Natali Caputo,     This patient remains ineligible for kidney transplant. His PA pressure is >55. Can more fluid be pulled off this patient? If so can you order a repeat 2Decho in 2 months to see if there is any improvement.     Thanks,   Ryan     ----- Message -----  From: Analilia Tessie  Sent: 8/11/2017   4:20 PM  To: Ryan Carroll RN        ----- Message -----  From: Tomas Rowland MD  Sent: 8/11/2017   4:15 PM  To: [facility] Pre-Kidney Transplant Clinical    Could you please update Dr Hurst about this patient  Thanks  ALMA  ----- Message -----  From: Gus Hurst MD  Sent: 8/11/2017   1:15 PM  To: Tomas Rowland MD, Nigel Guallpa MD    Any updates on suitability for renal transplant Tomas Weber       ----- Message -----  From: Nigel Guallpa MD  Sent: 6/17/2017  10:06 AM  To: Tomas Rowland MD, Gus Hurst MD    The last time I saw him I recommended a repeat echo and for him to establish care with cardiology. He is safe for transplant from a heart rhythm standpoint.    GP  ----- Message -----  From: Gus Hurst MD  Sent: 6/16/2017   5:26 PM  To: Tomas Rowland MD, MD Dr Saloni Marroquin,     Please review recent txp letter to Mr White as below , he was requesting me to look into it , can you please comment ,     thx     Gus Marin ,    Is he completely off the list from [facility] program is there are any chances     Gus Felix White:  MRN: [id number]     The [facility] team reviewed your transplant candidacy.  The kidney team has denied the option of transplantation due to current cardiac status based on records reviewed.  You will need a LHC and cardiac  clearance before being re-referred.     Although the [facility] team believes you are not a suitable transplant candidate, you have the option to be evaluated at other transplant centers.  You may request your [facility] records be sent to any center of your choice by contacting our Medical Records Department at [phone].     Attached is a letter from the [employer] ([employer]).  It describes the services and information offered to patients by [employer] and the Organ Procurement and Transplant Network.     The [facility] team sincerely wishes you the best and remains available to answer any questions.  Please do not hesitate to contact our pre-transplant office if we can assist you in any other way.  You should follow-up with your primary care physician for routine medical care.

## 2017-09-06 DIAGNOSIS — I27.9 CHRONIC PULMONARY HEART DISEASE: ICD-10-CM

## 2017-09-06 DIAGNOSIS — Z79.899 POLYPHARMACY: Primary | ICD-10-CM

## 2017-09-21 ENCOUNTER — LAB VISIT (OUTPATIENT)
Dept: LAB | Facility: HOSPITAL | Age: 47
End: 2017-09-21
Attending: INTERNAL MEDICINE
Payer: MEDICAID

## 2017-09-21 ENCOUNTER — HOSPITAL ENCOUNTER (OUTPATIENT)
Dept: PULMONOLOGY | Facility: CLINIC | Age: 47
Discharge: HOME | End: 2017-09-21
Payer: MEDICAID

## 2017-09-21 ENCOUNTER — INITIAL CONSULT (OUTPATIENT)
Dept: TRANSPLANT | Facility: CLINIC | Age: 47
End: 2017-09-21
Payer: MEDICAID

## 2017-09-21 VITALS — BODY MASS INDEX: 23.87 KG/M2 | WEIGHT: 186 LBS | HEIGHT: 74 IN

## 2017-09-21 VITALS
BODY MASS INDEX: 24.45 KG/M2 | DIASTOLIC BLOOD PRESSURE: 89 MMHG | WEIGHT: 190.5 LBS | HEART RATE: 78 BPM | HEIGHT: 74 IN | TEMPERATURE: 99 F | SYSTOLIC BLOOD PRESSURE: 139 MMHG

## 2017-09-21 DIAGNOSIS — Z98.890 HISTORY OF RADIOFREQUENCY ABLATION FOR COMPLEX RIGHT ATRIAL ARRHYTHMIA: ICD-10-CM

## 2017-09-21 DIAGNOSIS — I42.8 NONISCHEMIC CARDIOMYOPATHY: ICD-10-CM

## 2017-09-21 DIAGNOSIS — I50.42 CHRONIC COMBINED SYSTOLIC AND DIASTOLIC HEART FAILURE: ICD-10-CM

## 2017-09-21 DIAGNOSIS — Z95.2 S/P AVR (AORTIC VALVE REPLACEMENT): ICD-10-CM

## 2017-09-21 DIAGNOSIS — I27.9 CHRONIC PULMONARY HEART DISEASE: ICD-10-CM

## 2017-09-21 DIAGNOSIS — N18.6 ESRD (END STAGE RENAL DISEASE): ICD-10-CM

## 2017-09-21 DIAGNOSIS — Z79.899 POLYPHARMACY: ICD-10-CM

## 2017-09-21 DIAGNOSIS — I11.0 HYPERTENSIVE CARDIOMEGALY WITH HEART FAILURE: ICD-10-CM

## 2017-09-21 DIAGNOSIS — I10 UNCONTROLLED HYPERTENSION: ICD-10-CM

## 2017-09-21 DIAGNOSIS — I27.20 PULMONARY HTN: Primary | ICD-10-CM

## 2017-09-21 DIAGNOSIS — I47.19 ATRIAL TACHYCARDIA: ICD-10-CM

## 2017-09-21 DIAGNOSIS — Q24.9 ADULT CONGENITAL HEART DISEASE: ICD-10-CM

## 2017-09-21 LAB
ALBUMIN SERPL BCP-MCNC: 2.8 G/DL
ALP SERPL-CCNC: 115 U/L
ALT SERPL W/O P-5'-P-CCNC: 50 U/L
ANION GAP SERPL CALC-SCNC: 10 MMOL/L
AST SERPL-CCNC: 30 U/L
BASOPHILS # BLD AUTO: 0.01 K/UL
BASOPHILS NFR BLD: 0.2 %
BILIRUB SERPL-MCNC: 0.5 MG/DL
BUN SERPL-MCNC: 54 MG/DL
CALCIUM SERPL-MCNC: 9.8 MG/DL
CHLORIDE SERPL-SCNC: 101 MMOL/L
CHOLEST SERPL-MCNC: 188 MG/DL
CHOLEST/HDLC SERPL: 4.5 {RATIO}
CO2 SERPL-SCNC: 28 MMOL/L
CREAT SERPL-MCNC: 9 MG/DL
DIFFERENTIAL METHOD: ABNORMAL
EOSINOPHIL # BLD AUTO: 0.2 K/UL
EOSINOPHIL NFR BLD: 3.3 %
ERYTHROCYTE [DISTWIDTH] IN BLOOD BY AUTOMATED COUNT: 14.4 %
EST. GFR  (AFRICAN AMERICAN): 7.3 ML/MIN/1.73 M^2
EST. GFR  (NON AFRICAN AMERICAN): 6.3 ML/MIN/1.73 M^2
GLUCOSE SERPL-MCNC: 84 MG/DL
HCT VFR BLD AUTO: 32.9 %
HDLC SERPL-MCNC: 42 MG/DL
HDLC SERPL: 22.3 %
HGB BLD-MCNC: 11.2 G/DL
LDLC SERPL CALC-MCNC: 122.4 MG/DL
LYMPHOCYTES # BLD AUTO: 1.8 K/UL
LYMPHOCYTES NFR BLD: 28.2 %
MAGNESIUM SERPL-MCNC: 2 MG/DL
MCH RBC QN AUTO: 35.6 PG
MCHC RBC AUTO-ENTMCNC: 34 G/DL
MCV RBC AUTO: 104 FL
MONOCYTES # BLD AUTO: 0.5 K/UL
MONOCYTES NFR BLD: 7 %
NEUTROPHILS # BLD AUTO: 3.9 K/UL
NEUTROPHILS NFR BLD: 61.1 %
NONHDLC SERPL-MCNC: 146 MG/DL
PLATELET # BLD AUTO: 195 K/UL
PMV BLD AUTO: 10.2 FL
POTASSIUM SERPL-SCNC: 4.2 MMOL/L
PROT SERPL-MCNC: 9.7 G/DL
RBC # BLD AUTO: 3.15 M/UL
SODIUM SERPL-SCNC: 139 MMOL/L
TRIGL SERPL-MCNC: 118 MG/DL
WBC # BLD AUTO: 6.41 K/UL

## 2017-09-21 PROCEDURE — 94620 PR PULMONARY STRESS TESTING,SIMPLE: CPT | Mod: 26,S$PBB,, | Performed by: INTERNAL MEDICINE

## 2017-09-21 PROCEDURE — 83735 ASSAY OF MAGNESIUM: CPT

## 2017-09-21 PROCEDURE — 85025 COMPLETE CBC W/AUTO DIFF WBC: CPT

## 2017-09-21 PROCEDURE — 36415 COLL VENOUS BLD VENIPUNCTURE: CPT

## 2017-09-21 PROCEDURE — 3008F BODY MASS INDEX DOCD: CPT | Mod: ,,, | Performed by: INTERNAL MEDICINE

## 2017-09-21 PROCEDURE — 99215 OFFICE O/P EST HI 40 MIN: CPT | Mod: S$PBB,,, | Performed by: INTERNAL MEDICINE

## 2017-09-21 PROCEDURE — 80053 COMPREHEN METABOLIC PANEL: CPT

## 2017-09-21 PROCEDURE — 94620 PR PULMONARY STRESS TESTING,SIMPLE: CPT | Mod: PBBFAC | Performed by: INTERNAL MEDICINE

## 2017-09-21 PROCEDURE — 99214 OFFICE O/P EST MOD 30 MIN: CPT | Mod: PBBFAC,25 | Performed by: INTERNAL MEDICINE

## 2017-09-21 PROCEDURE — 80061 LIPID PANEL: CPT

## 2017-09-21 PROCEDURE — 99999 PR PBB SHADOW E&M-EST. PATIENT-LVL IV: CPT | Mod: PBBFAC,,, | Performed by: INTERNAL MEDICINE

## 2017-09-21 NOTE — PROGRESS NOTES
Subjective:    Patient ID:  Isidro White is a 47 y.o. male who presents for evaluation of Pulmonary Hypertension.    HPI  48 yo with  ASD closure at age 8 ([facility]), NICM, s/p AVR with a 22 mm mechanical valve and TV annuloplasty with a 28 mm ring 3/17, HTN, SVT, EP performed RFA (3/13/17) and has been on HD since his surgery, referred by Dr Hurst (Nephrology) for evaluation and management of PH as he is being considered for kidney transplant    Pt says growing up he did well- played sports, ran track, like a normal kid- until high school and college when he started drinking- was dx'd with HTN in  was treated with hctz and he wasn't eating right, so he needed more medication and still didn't get better because of his diet- only stopped drinking about 1.5yr ago- lost his insurance and couldn't afford to go to the doctor, so he couldn't get his meds anymore- wound up at [facility] walk in clinic and his BP was 200/100- got admitted- ultimately had a cath (see below)  And underwent AVR/TVR by Dr Gastelum    Since his surgery, after he recovered, has had been feeling great- remains on HD- feels really weak after HD and has to go home and lay down. The next day he feels back to normal. He not working, on disability- goes to [employer], walks 2 miles on the treadmill and gets on the bike. Does stuff around the house, washes his clothes, sweeps, mops, vacuums, pulls weeds- sometimes has to stop and catch his breath, though not like he used to- can do his ADls without HAY- only when he really exerts  Himself- walks 2.8mph on treadmill and at level 1 incline- does about 30-45min.  Denies CP, orthopnea, PND and swelling- still makes urine but only once a day    SH never smoked  Quit etoh 1.5yr ago    FH: dad  was massive MI at 68, had DM      Six Minute Walk Test:   Scheduled for after this visit this am    Cath   1. Coronary angiography.      a.     Left main widely patent.      b.     LAD widely  patent.      c.     Ramus widely patent and massive in size.      d.     Circumflex widely patent with a small OM 1 and OM 2 branch.      e.     Right coronary widely patent.  2. Hemodynamics:  Right heart catheterization.      a.     Pulmonary capillary wedge pressure 33 at end-expiration.      b.     Pulmonary artery pressure 78/42.      c.     Right ventricular pressure 70/21.      d.     Right atrial pressure 22 with a peak V-wave of 24.      e.     Cardiac output by thermodilution is between 6 and 7       L/minute, by Patricia 6.1 L/minute.    CONCLUSION  1. Normal coronary arteries.  2. Pulmonary hypertension.  3. Severe aortic insufficiency.    Echo        Results for orders placed or performed in visit on 06/05/17   2D echo with color flow doppler   Result Value Ref Range    EF 55 55 - 65    Diastolic Dysfunction Yes (A)     Est. PA Systolic Pressure 67.9 (A)     Pericardial Effusion SMALL (A)     Tricuspid Valve Regurgitation MODERATE (A)      1 - Concentric hypertrophy.     2 - No wall motion abnormalities.     3 - Normal left ventricular systolic function (EF 55-60%).     4 - Biatrial enlargement.     5 - Restrictive LV filling pattern, indicating markedly elevated LAP (grade 3 diastolic dysfunction).     6 - Right ventricular enlargement with hypertrophy, with mildly depressed systolic function.     7 - Pulmonary hypertension. The estimated PA systolic pressure is greater than 68 mmHg.     8 - Aortic valve prosthesis, effective prosthetic valve area corrected for BSA is 0.7 cm2.     9 - Moderate tricuspid regurgitation.     10 - Small pericardial effusion.     11 - There is an increased density to the myocardium, suggesting a myocardial infiltrative process (clinical correlation required).     12 -  LV GLS is low, indicating impaired LV contractility.  Clinical correlation is required..     ECG 6/17  Normal sinus rhythm  Left axis deviation  LVH with QRS widening and repolarization abnormality  Abnormal  ECG    CXR  3 / 14   /17  Postthoracentesis.  Central venous catheter remain.  Cardiomegaly.  Opacification of the right lung base probably atelectatic changes and small amount of pleural effusion.  The left lung is clear.    CT Chest   2 /17     Aorta maintains normal caliber, contour and course.  Calcification of the aortic valve is present.  There is also bulky calcification in the cardiac fibrous skeleton at the junction of aortic and mitral bowels.  LEFT ventricle and RIGHT atrium appear enlarged but detail is limited on this study performed without intravenous contrast medium and without gating.    2. Retrosternal structures were reviewed in this patient with planned repeat sternotomy.      - There are seven sternal sutures, the first three at the level of the manubrium.      - There is minimal fat  the posterior table of the manubrium from the anterior margin of the LEFT innominate vein.      - At least 1 cm of fat separates the sternomanubrial junction from the anterior wall of the distal ascending aorta.  The aorta remains well removed from the posterior table of the sternum inferiorly.      - However the anterior wall of the RIGHT ventricle is closely opposed to the posterior table of the sternum between the fourth and fifth sternal sutures extending inferiorly past the seventh sternal suture.  This finding is especially well seen on sagittal series 602 image 39.    3. Solitary 0.3 cm pulmonary nodule within the posterior basal aspect of the left lower lobe (axial series 2, image 32). Per Fleischner Society guidelines; in a low risk patient, no follow up is needed. In a high risk patient (e.g. history of smoking or malignancy), those guidelines recommend 12 month CT chest follow up to assess for stability 2/2018.    4. Dilated hepatic veins suggest elevated RIGHT heart pressures, tricuspid insufficiency or both.    5. Moderate volume abdominal ascites which is incompletely visualized.  Etiology  "of the intraperitoneal fluid is unclear aerated may be related to elevated RIGHT heart pressures.    PFTs   2 / 17   /  Mild (small airways) obstruction. Severe restriction. MVV is severely decreased. Normal oximetry          Review of Systems   Constitution: Negative for chills, fever, malaise/fatigue and weight gain.   HENT: Negative.    Eyes: Negative.    Cardiovascular: Positive for dyspnea on exertion. Negative for chest pain, leg swelling, near-syncope, orthopnea, palpitations, paroxysmal nocturnal dyspnea and syncope.   Respiratory: Negative for cough and shortness of breath.    Endocrine: Negative.    Skin: Negative.    Musculoskeletal: Negative.    Gastrointestinal: Negative for bloating, abdominal pain and change in bowel habit.   Neurological: Negative for dizziness and light-headedness.   Psychiatric/Behavioral: Negative for depression.        Objective:  /89   Pulse 78   Temp 99 °F (37.2 °C)   Ht 6' 2" (1.88 m)   Wt 86.4 kg (190 lb 7.6 oz)   BMI 24.46 kg/m²       Physical Exam   Constitutional: He is oriented to person, place, and time. He appears well-developed and well-nourished.   HENT:   Head: Normocephalic and atraumatic.   Eyes: Right eye exhibits no discharge. Left eye exhibits no discharge.   Neck: Neck supple. No JVD present. No thyromegaly present.   Cardiovascular: Normal rate and regular rhythm.  Exam reveals no gallop and no friction rub.    No murmur heard.       Pulmonary/Chest: Effort normal and breath sounds normal. No respiratory distress. He has no wheezes. He has no rales.   Abdominal: Soft. Bowel sounds are normal. He exhibits distension. There is no tenderness.   Mild ascites   Musculoskeletal: Normal range of motion. He exhibits no edema or tenderness.   Neurological: He is alert and oriented to person, place, and time. No cranial nerve deficit. Coordination normal.   Skin: Skin is warm and dry. No rash noted.   Psychiatric: He has a normal mood and affect. Judgment " and thought content normal.           Chemistry        Component Value Date/Time     (L) 03/19/2017 0617    K 4.4 03/19/2017 0617    CL 99 03/19/2017 0617    CO2 25 03/19/2017 0617    BUN 57 (H) 03/19/2017 0617    CREATININE 6.4 (H) 03/19/2017 0617    GLU 87 03/19/2017 0617        Component Value Date/Time    CALCIUM 8.9 03/19/2017 0617    ALKPHOS 106 03/19/2017 0617    AST 15 03/19/2017 0617    ALT 6 (L) 03/19/2017 0617    BILITOT 0.7 03/19/2017 0617    ESTGFRAFRICA 11.0 (A) 03/19/2017 0617    EGFRNONAA 9.5 (A) 03/19/2017 0617            Magnesium   Date Value Ref Range Status   03/19/2017 2.1 1.6 - 2.6 mg/dL Final       Lab Results   Component Value Date    WBC 8.09 03/19/2017    HGB 8.9 (L) 03/19/2017    HCT 26.2 (L) 03/19/2017    MCV 89 03/19/2017     03/19/2017       Lab Results   Component Value Date    INR 1.9 (H) 03/19/2017    INR 1.8 (H) 03/18/2017    INR 1.7 (H) 03/17/2017       BNP   Date Value Ref Range Status   03/09/2017 1,292 (H) 0 - 99 pg/mL Final     Comment:     Values of less than 100 pg/ml are consistent with non-CHF populations.       LD   Date Value Ref Range Status   03/09/2017 249 110 - 260 U/L Final     Comment:     Results are increased in hemolyzed samples.             Assessment:       1. Pulmonary HTN- in setting of restrictive filling, HTN, ESRD, valvular heart disease- had mild RVE and RVH on echo, PAP do not appear to have changed much after AVR/TVR by echo   2. S/P AVR (aortic valve replacement)    3. ESRD (end stage renal disease)    4. History of radiofrequency ablation for complex right atrial arrhythmia    5. Nonischemic cardiomyopathy    6. Hypertensive cardiomegaly with heart failure    7. Adult congenital heart disease    8. Chronic combined systolic and diastolic heart failure    9. Atrial tachycardia      WHO Group:2  Functional Class2     Plan:       Would like to get RHC now that pt has been on HD and has had his valves fixed to reassesss his PAP- I do not  think it will be appropriate to treat his pulm htn with pulm vasodilators as this is WHO group 2 PH and pulm vasodilators have been shown to worsen the clinical picture by increasing LV preload, but I do think it will be helpful to know what his PA and filling pressures are in determining cardiac risk assessment for renal transplant    We talked about his diastolic HF today and that there is no cure, but that careful Na and fluid restriction and BP control can help keep him compensated. He was previously on isosorbide which I like to use in combo with hydralazine, but it was appropriately stopped by Dr Guallpa so that he can take cialis for his ED-  I would propose that we put him on short acting isordil and have him take this during the week and use the cialis on the weekends as he intends- will leave the final call  Up to Dr Guallpa though as she will be following him long term    Will have his HD center draw his labs and fax them to us the am of HD and he will come here immediately after HD for his RHC

## 2017-09-21 NOTE — LETTER
September 21, 2017        Gus Hurst  [address] [address]  Phone: [phone]  Fax: [phone]             [facility]  [address]  [facility] [address]  Phone: [phone]   Patient: Isidro White   MR Number: [id number]   YOB: 1970   Date of Visit: 9/21/2017       Dear Dr. Gus Hurst    Thank you for referring Isidro White to me for evaluation. Attached you will find relevant portions of my assessment and plan of care.    If you have questions, please do not hesitate to call me. I look forward to following Isidro White along with you.    Sincerely,    Lorelei Braun MD    Enclosure    If you would like to receive this communication electronically, please contact [email or web address] or [phone] to request [employer] access.    [employer] is a tool which provides read-only access to select patient information with whom you have a relationship. Its easy to use and provides real time access to review your patients record including encounter summaries, notes, results, and demographic information.    If you feel you have received this communication in error or would no longer like to receive these types of communications, please e-mail [email or web address]

## 2017-09-22 RX ORDER — ATORVASTATIN CALCIUM 40 MG/1
40 TABLET, FILM COATED ORAL DAILY
COMMUNITY
Start: 2017-09-21 | End: 2019-02-12

## 2017-09-22 RX ORDER — ATORVASTATIN CALCIUM 40 MG/1
40 TABLET, FILM COATED ORAL DAILY
Qty: 90 TABLET | Refills: 3 | Status: CANCELLED | OUTPATIENT
Start: 2017-09-22

## 2017-09-22 NOTE — PROCEDURES
Isidro White is a 47 y.o.  male patient, who presents for a 6 minute walk test ordered by MD. Aparna.  The diagnosis is Pulmonary Hypertension.  The patient's BMI is 23.9 kg/m2.  Predicted distance (lower limit of normal) is 533.68 meters.      Test Results:    The test was completed without stopping.   The total time walked was 360 seconds.  During walking, the patient reported:  No complaints. The patient used no assistive devices  during testing.     09/21/2017---------Distance: 419.1 meters (1375 feet)     O2 Sat % Supplemental Oxygen Heart Rate Blood Pressure Ruel Scale   Pre-exercise  (Resting) 98 % Room Air 67 bpm 146/79 mmHg 0.5   During Exercise 95 % Room Air 89 bpm (!) 162/92 mmHg 2   Post-exercise  (Recovery) 97 % Room Air  72 bpm   mmHg       Recovery Time: 98 seconds    Performing nurse/tech: FAHAD Ortiz RRT      PREVIOUS STUDY:   The patient has not had a previous study.      CLINICAL INTERPRETATION:  Six minute walk distance is 419.1 meters (1375 feet) with light dyspnea.  During exercise, there was no significant desaturation while breathing room air.  Both blood pressure and heart rate remained stable with walking.  The patient did not report non-pulmonary symptoms during exercise.  No previous study performed.  Based upon age and body mass index, exercise capacity is less than predicted.

## 2017-09-22 NOTE — TELEPHONE ENCOUNTER
----- Message from Zulema Guallpa MD sent at 9/22/2017  3:58 PM CDT -----  10 year CV risk is elevated at 10.9% Recommend atorvastatin 40 mg daily.

## 2017-11-01 ENCOUNTER — TELEPHONE (OUTPATIENT)
Dept: TRANSPLANT | Facility: CLINIC | Age: 47
End: 2017-11-01

## 2017-11-01 NOTE — TELEPHONE ENCOUNTER
----- Message from Nelly Adentho sent at 11/1/2017  1:45 PM CDT -----  Contact: pt   Pt is calling because he is having a procedure with Dr. Braun on Monday and he needs to know when to stop the warfarin.  He can be reached @ [phone].  Thanks!!

## 2017-11-01 NOTE — TELEPHONE ENCOUNTER
Confirmed w/Dr Braun that patient should hold coumadin 3 days prior to RHC and requires no lovenox bridge. Called patient back, but no answer, and pt has VM that is not set up to receive messages. Will attempt call back at later time.

## 2017-11-02 ENCOUNTER — TELEPHONE (OUTPATIENT)
Dept: TRANSPLANT | Facility: CLINIC | Age: 47
End: 2017-11-02

## 2017-11-02 NOTE — TELEPHONE ENCOUNTER
----- Message from Gabriele Lan sent at 11/2/2017  4:15 PM CDT -----  Contact: patient  Attn Mirna  Please call pt at [phone]. Returning your call     Thank you

## 2017-11-02 NOTE — TELEPHONE ENCOUNTER
"Spoke w/pt, who feels that having RHC has become "too complicated" (secondary to need for lovenox bridge) and he wonders what other procedure he could do that would be easier. Explained again why RHC was recommended. Pt requests that inquiry be sent to Dr Braun regarding possibility of doing something other than RHC for diagnostic. Message sent to Dr Braun regarding same.   "

## 2017-11-03 NOTE — TELEPHONE ENCOUNTER
"Per Dr Braun, patient needs RHC if she is to consider clearing him for kidney transplant. Pt informed of same and verbalized understanding "I knew that would happen."     Pt anxious about sticking himself for lovenox bridge "I don't know if I can do that." Suggested family member asisst, if possible.    Pt understands that RHC will be rescheduled, and then request will be sent to physician at [facility] (Dr Skelton) who manages pt's coumadin, to assist with set-up/teaching of lovenox bridge in anticipation of RHC.   "

## 2017-11-07 NOTE — PROGRESS NOTES
CARDIOTHORACIC SURGERY PROGRESS NOTE    Called to bedside by nursing due to concern for epistaxis. Patient is s/p AVR and TVR. Currently on heparin gtt at 1700 U/hr. Last aPTT 80. Will hold heparin for 90 minutes, administer Afrin to nares, and pack with gauze. Will re-send aPTT at 2100 and monitor for resolution.      Isidoro Soto MD  Surgery Resident, PGY-II  Pager: [phone]  2/13/2017 7:41 PM   (3) no apparent problem

## 2017-11-09 ENCOUNTER — TELEPHONE (OUTPATIENT)
Dept: CARDIOLOGY | Facility: CLINIC | Age: 47
End: 2017-11-09

## 2017-11-09 NOTE — TELEPHONE ENCOUNTER
[facility] and nicholas penn that the pt's insurance will not cover the Cialis.

## 2017-11-09 NOTE — TELEPHONE ENCOUNTER
----- Message from Otilia Suárez MA sent at 11/9/2017  3:28 PM CST -----  Contact: patient called  Dot the patient need refill on his medication Cialis 5 mg . Please call [facility] at [phone]. Last visit was on 6- Dr. Guallpa. Thank you.

## 2017-11-13 ENCOUNTER — TELEPHONE (OUTPATIENT)
Dept: CARDIOLOGY | Facility: CLINIC | Age: 47
End: 2017-11-13

## 2017-11-13 DIAGNOSIS — N52.2 DRUG-INDUCED ERECTILE DYSFUNCTION: ICD-10-CM

## 2017-11-13 RX ORDER — TADALAFIL 5 MG/1
5 TABLET ORAL
Qty: 15 TABLET | Refills: 6 | Status: SHIPPED | OUTPATIENT
Start: 2017-11-13 | End: 2017-11-14 | Stop reason: SDUPTHER

## 2017-11-13 NOTE — TELEPHONE ENCOUNTER
----- Message from Lupe Ferrera MA sent at 11/13/2017 11:31 AM CST -----  Contact: self  Pt. is calling to get  a refill for Cialis. Please call. pt. Last visit 6/20/17.

## 2017-11-13 NOTE — TELEPHONE ENCOUNTER
,        LOV:6/20/17.Pt requesting refill auth for Cialis.Would you like to authorize refills for the pt's Cialis?Please advise.Thanks,Mela

## 2017-11-14 DIAGNOSIS — N52.2 DRUG-INDUCED ERECTILE DYSFUNCTION: ICD-10-CM

## 2017-11-14 RX ORDER — TADALAFIL 5 MG/1
5 TABLET ORAL
Qty: 15 TABLET | Refills: 6 | Status: SHIPPED | OUTPATIENT
Start: 2017-11-14 | End: 2017-12-05

## 2017-11-20 ENCOUNTER — TELEPHONE (OUTPATIENT)
Dept: TRANSPLANT | Facility: CLINIC | Age: 47
End: 2017-11-20

## 2017-12-05 ENCOUNTER — TELEPHONE (OUTPATIENT)
Dept: CARDIOLOGY | Facility: CLINIC | Age: 47
End: 2017-12-05

## 2017-12-05 NOTE — TELEPHONE ENCOUNTER
,          LOV:6/20/17.[facility] said that the pt would like to switch from Cialis 5 mg take 1 tablet Q 72 hours PRN erectile dysfunction to Sildenafil 20 mg take 2-5 tabs QD PRN ED because the Cialis is not covered.Please advise.Thanks,Mela

## 2017-12-06 ENCOUNTER — TELEPHONE (OUTPATIENT)
Dept: PHARMACY | Facility: HOSPITAL | Age: 47
End: 2017-12-06

## 2017-12-06 RX ORDER — SILDENAFIL CITRATE 20 MG/1
TABLET ORAL
Qty: 20 TABLET | Refills: 6 | Status: SHIPPED | OUTPATIENT
Start: 2017-12-06 | End: 2018-12-13

## 2017-12-06 NOTE — TELEPHONE ENCOUNTER
Informed patient that [facility] received a prescription for Revatio. Also informed him that the Revatio is not covered by his insurance. He asked that we transfer his prescription to [facility].

## 2017-12-13 ENCOUNTER — TELEPHONE (OUTPATIENT)
Dept: TRANSPLANT | Facility: CLINIC | Age: 47
End: 2017-12-13

## 2017-12-13 NOTE — TELEPHONE ENCOUNTER
Spoke with La. Patient has dialysis MWF so can't do RHC on 12/18. Rescheduled patient for 12/21. Contacted Dr. Holt's office to notify them of change of date and discuss Heparin/Lovenox bridge. Stiven took all information and will ask Dr. Holt for new bridge schedule and call this office with instructions.

## 2017-12-13 NOTE — TELEPHONE ENCOUNTER
----- Message from Gabriele Lan sent at 12/13/2017  9:13 AM CST -----  Contact: La-nurse/[facility]  Please call La at [phone]. Patient needs to reschedule the Heart Cath procedure scheduled on 12/18/17 at [facility] due to patient having dialysis on Mon,Wed and Fri. Also nurse needs to discuss medication    Thank you

## 2017-12-21 ENCOUNTER — LAB VISIT (OUTPATIENT)
Dept: LAB | Facility: HOSPITAL | Age: 47
End: 2017-12-21
Attending: INTERNAL MEDICINE
Payer: MEDICAID

## 2017-12-21 ENCOUNTER — TELEPHONE (OUTPATIENT)
Dept: TRANSPLANT | Facility: CLINIC | Age: 47
End: 2017-12-21

## 2017-12-21 DIAGNOSIS — I27.20 PULMONARY HTN: ICD-10-CM

## 2017-12-21 DIAGNOSIS — I27.20 PULMONARY HYPERTENSION: Primary | ICD-10-CM

## 2017-12-21 LAB
ALBUMIN SERPL BCP-MCNC: 2.5 G/DL
ALP SERPL-CCNC: 87 U/L
ALT SERPL W/O P-5'-P-CCNC: 54 U/L
ANION GAP SERPL CALC-SCNC: 13 MMOL/L
AST SERPL-CCNC: 24 U/L
BASOPHILS # BLD AUTO: 0.02 K/UL
BASOPHILS NFR BLD: 0.3 %
BILIRUB SERPL-MCNC: 0.6 MG/DL
BUN SERPL-MCNC: 39 MG/DL
CALCIUM SERPL-MCNC: 9.9 MG/DL
CHLORIDE SERPL-SCNC: 98 MMOL/L
CO2 SERPL-SCNC: 28 MMOL/L
CREAT SERPL-MCNC: 8.7 MG/DL
DIFFERENTIAL METHOD: ABNORMAL
EOSINOPHIL # BLD AUTO: 0.2 K/UL
EOSINOPHIL NFR BLD: 2.7 %
ERYTHROCYTE [DISTWIDTH] IN BLOOD BY AUTOMATED COUNT: 14.3 %
EST. GFR  (AFRICAN AMERICAN): 7.6 ML/MIN/1.73 M^2
EST. GFR  (NON AFRICAN AMERICAN): 6.6 ML/MIN/1.73 M^2
GLUCOSE SERPL-MCNC: 78 MG/DL
HCT VFR BLD AUTO: 30.5 %
HGB BLD-MCNC: 10.1 G/DL
IMM GRANULOCYTES # BLD AUTO: 0.01 K/UL
IMM GRANULOCYTES NFR BLD AUTO: 0.1 %
LYMPHOCYTES # BLD AUTO: 1.1 K/UL
LYMPHOCYTES NFR BLD: 16.7 %
MCH RBC QN AUTO: 34.8 PG
MCHC RBC AUTO-ENTMCNC: 33.1 G/DL
MCV RBC AUTO: 105 FL
MONOCYTES # BLD AUTO: 1.1 K/UL
MONOCYTES NFR BLD: 15.8 %
NEUTROPHILS # BLD AUTO: 4.3 K/UL
NEUTROPHILS NFR BLD: 64.4 %
NRBC BLD-RTO: 0 /100 WBC
PLATELET # BLD AUTO: 190 K/UL
PMV BLD AUTO: 10 FL
POTASSIUM SERPL-SCNC: 4.6 MMOL/L
PROT SERPL-MCNC: 9.4 G/DL
RBC # BLD AUTO: 2.9 M/UL
SODIUM SERPL-SCNC: 139 MMOL/L
WBC # BLD AUTO: 6.72 K/UL

## 2017-12-21 PROCEDURE — 36415 COLL VENOUS BLD VENIPUNCTURE: CPT

## 2017-12-21 PROCEDURE — 85025 COMPLETE CBC W/AUTO DIFF WBC: CPT

## 2017-12-21 PROCEDURE — 80053 COMPREHEN METABOLIC PANEL: CPT

## 2017-12-21 NOTE — TELEPHONE ENCOUNTER
Received voicemail after 5 pm yesterday from patient's PCP that INR was 3.5. Called patient to let him know that we would need to postpone procedure. Patient relies on transport so had arrived at the hospital early (Scheduled labs at 1:30). Performed an INR fingerstick and patient's INR is 3.2. Notified Dr. Braun. Procedure will be rescheduled after the New Year. Notified Dr. Holt's office. Patient verbalized understanding of all. No other concerns at this time.

## 2018-01-17 DIAGNOSIS — I27.20 PULMONARY HYPERTENSION: Primary | ICD-10-CM

## 2018-02-02 RX ORDER — LISINOPRIL 40 MG/1
TABLET ORAL
Qty: 30 TABLET | Refills: 6 | Status: SHIPPED | OUTPATIENT
Start: 2018-02-02 | End: 2018-10-02 | Stop reason: SDUPTHER

## 2018-02-05 ENCOUNTER — TELEPHONE (OUTPATIENT)
Dept: CARDIOLOGY | Facility: CLINIC | Age: 48
End: 2018-02-05

## 2018-02-05 NOTE — TELEPHONE ENCOUNTER
,         LOV:6/20/17.The Sanjuana Richey from Nephrology said that the pt told them that he was taking both the Labetolol 300 mg 1 tab every 12 hrs as well as the metoprolol succinate 100 mg take 2.5 tablets to = 250 mg daily.The pt told her that you were aware of this.She wanted to double check to see if this was correct.Please advise.Thanks,Mela

## 2018-02-05 NOTE — TELEPHONE ENCOUNTER
----- Message from Yenni Zurita RN sent at 2/5/2018  2:29 PM CST -----  Contact: Becky McAliser [facility] NP- [phone]  Sanjuana is calling regarding the Pt's medication What beta blockers are he suppose to take? He states that he has been take both Metoprolol succinate (TOPROL-XL) 100 MG 24 hr tablet and taking Labetalol, but I didn't see it on his med list and neither does she. He states that Dr. Guallpa knows about him taking both meds. Please call Sanjuana calero @ [phone]. Thanks, Nichelle

## 2018-02-07 ENCOUNTER — TELEPHONE (OUTPATIENT)
Dept: NEPHROLOGY | Facility: CLINIC | Age: 48
End: 2018-02-07

## 2018-02-07 DIAGNOSIS — I27.20 PULMONARY HYPERTENSION: Primary | ICD-10-CM

## 2018-02-07 DIAGNOSIS — I10 ESSENTIAL (PRIMARY) HYPERTENSION: ICD-10-CM

## 2018-02-07 NOTE — TELEPHONE ENCOUNTER
He should not be taking labetalol and metoprolol. One or the other. If nephrology prefers labetalol, that is fine, but he should not be taking both.

## 2018-02-07 NOTE — TELEPHONE ENCOUNTER
----- Message from Priscila Paulino sent at 2/7/2018  4:19 PM CST -----  Contact: [facility] [place]   Caller state Pt states he is suppose to have a script for Labetalol, but Pharmacy doesn't have it [phone]

## 2018-02-07 NOTE — TELEPHONE ENCOUNTER
Returned call to pharmacy in regards to dialysis pt. Patient states that he needs a refill for Labetalol. Don't see it in his chart. Please advise.

## 2018-02-07 NOTE — TELEPHONE ENCOUNTER
Informed Mirna Minor RN, Pulmonary HTN that pt was sent to a hospital in  today for HR in the 150's.

## 2018-02-07 NOTE — TELEPHONE ENCOUNTER
Left a detailed VM for the NP with Dr. Guallpa's message with call back name and number for any questions or concerns.

## 2018-02-07 NOTE — TELEPHONE ENCOUNTER
Spoke with the NP in nephrology - says that the ptwas sent to a hospital today due to HR in the 150's. Says not sure what hospital - is in the [facility].

## 2018-02-08 ENCOUNTER — DOCUMENTATION ONLY (OUTPATIENT)
Dept: NEPHROLOGY | Facility: CLINIC | Age: 48
End: 2018-02-08

## 2018-02-08 NOTE — PROGRESS NOTES
Mr Cindy is on HD under my care , he is also closely followed by Pulmonology and cards in [place],     He is on metoprolol succinate for BP and heart rate control,     We are using labetolol 100 mg when heart rate more than 120 at HD , last 2-3 txs his pulse went to 150's towards the end of tx making him SOB , yesterday he was sent to ER via ambulance as he was in significant distress,     Will discuss with his cardiologist Dr Guallpa if an EP study is needed for his recurrent tachycardia     Gus Hurst MD

## 2018-02-23 DIAGNOSIS — I47.19 ATRIAL TACHYCARDIA: Primary | ICD-10-CM

## 2018-02-24 RX ORDER — METOPROLOL SUCCINATE 200 MG/1
TABLET, EXTENDED RELEASE ORAL
Qty: 90 TABLET | Refills: 2 | Status: SHIPPED | OUTPATIENT
Start: 2018-02-24 | End: 2019-03-15

## 2018-02-28 ENCOUNTER — TELEPHONE (OUTPATIENT)
Dept: ELECTROPHYSIOLOGY | Facility: CLINIC | Age: 48
End: 2018-02-28

## 2018-02-28 NOTE — TELEPHONE ENCOUNTER
Called and spoke with pt.  He is admitted to [facility] in [place].  R/S pt and will mail it out to him.

## 2018-03-09 DIAGNOSIS — Q24.9 ADULT CONGENITAL HEART DISEASE: ICD-10-CM

## 2018-03-09 DIAGNOSIS — I42.8 NONISCHEMIC CARDIOMYOPATHY: ICD-10-CM

## 2018-03-09 DIAGNOSIS — Z98.890 HISTORY OF RADIOFREQUENCY ABLATION FOR COMPLEX RIGHT ATRIAL ARRHYTHMIA: ICD-10-CM

## 2018-03-09 DIAGNOSIS — I10 UNCONTROLLED HYPERTENSION: ICD-10-CM

## 2018-03-09 DIAGNOSIS — I35.0 NONRHEUMATIC AORTIC VALVE STENOSIS: ICD-10-CM

## 2018-03-09 DIAGNOSIS — I50.42 CHRONIC COMBINED SYSTOLIC AND DIASTOLIC HEART FAILURE: ICD-10-CM

## 2018-03-09 DIAGNOSIS — N18.6 ESRD (END STAGE RENAL DISEASE): ICD-10-CM

## 2018-03-09 DIAGNOSIS — I47.19 ATRIAL TACHYCARDIA: ICD-10-CM

## 2018-03-09 DIAGNOSIS — Z95.2 S/P AVR (AORTIC VALVE REPLACEMENT): ICD-10-CM

## 2018-03-12 DIAGNOSIS — Z95.2 S/P AVR (AORTIC VALVE REPLACEMENT): ICD-10-CM

## 2018-03-12 DIAGNOSIS — I47.19 ATRIAL TACHYCARDIA: ICD-10-CM

## 2018-03-12 DIAGNOSIS — Z98.890 HISTORY OF RADIOFREQUENCY ABLATION FOR COMPLEX RIGHT ATRIAL ARRHYTHMIA: ICD-10-CM

## 2018-03-12 DIAGNOSIS — I42.8 NONISCHEMIC CARDIOMYOPATHY: ICD-10-CM

## 2018-03-12 DIAGNOSIS — I35.0 NONRHEUMATIC AORTIC VALVE STENOSIS: ICD-10-CM

## 2018-03-12 DIAGNOSIS — N18.6 ESRD (END STAGE RENAL DISEASE): ICD-10-CM

## 2018-03-12 DIAGNOSIS — I10 UNCONTROLLED HYPERTENSION: ICD-10-CM

## 2018-03-12 DIAGNOSIS — I50.42 CHRONIC COMBINED SYSTOLIC AND DIASTOLIC HEART FAILURE: ICD-10-CM

## 2018-03-12 DIAGNOSIS — Q24.9 ADULT CONGENITAL HEART DISEASE: ICD-10-CM

## 2018-03-12 RX ORDER — AMLODIPINE BESYLATE 10 MG/1
TABLET ORAL
Qty: 30 TABLET | Refills: 8 | Status: SHIPPED | OUTPATIENT
Start: 2018-03-12 | End: 2018-03-12 | Stop reason: SDUPTHER

## 2018-03-12 RX ORDER — AMLODIPINE BESYLATE 10 MG/1
10 TABLET ORAL DAILY
Qty: 30 TABLET | Refills: 8 | Status: SHIPPED | OUTPATIENT
Start: 2018-03-12 | End: 2019-07-05 | Stop reason: SDUPTHER

## 2018-05-03 RX ORDER — CALCIUM ACETATE 667 MG/1
667 CAPSULE ORAL 3 TIMES DAILY
Qty: 90 CAPSULE | Refills: 11 | Status: ON HOLD | OUTPATIENT
Start: 2018-05-03 | End: 2019-08-27 | Stop reason: SDUPTHER

## 2018-05-03 NOTE — TELEPHONE ENCOUNTER
----- Message from Analilia Prater sent at 5/3/2018 10:58 AM CDT -----  Pt at [phone]//states he is needing a refill of med//he does not know the name of med//uses//[facility] in [place], [place]//please call//thanks/lh

## 2018-05-15 ENCOUNTER — LAB VISIT (OUTPATIENT)
Dept: LAB | Facility: HOSPITAL | Age: 48
End: 2018-05-15
Attending: INTERNAL MEDICINE
Payer: MEDICAID

## 2018-05-15 ENCOUNTER — OFFICE VISIT (OUTPATIENT)
Dept: TRANSPLANT | Facility: CLINIC | Age: 48
End: 2018-05-15
Payer: MEDICAID

## 2018-05-15 VITALS
OXYGEN SATURATION: 98 % | BODY MASS INDEX: 23.2 KG/M2 | SYSTOLIC BLOOD PRESSURE: 127 MMHG | DIASTOLIC BLOOD PRESSURE: 75 MMHG | WEIGHT: 180.75 LBS | HEIGHT: 74 IN | HEART RATE: 78 BPM

## 2018-05-15 DIAGNOSIS — Z95.2 S/P AVR (AORTIC VALVE REPLACEMENT): ICD-10-CM

## 2018-05-15 DIAGNOSIS — Q24.9 ADULT CONGENITAL HEART DISEASE: ICD-10-CM

## 2018-05-15 DIAGNOSIS — N18.6 ESRD (END STAGE RENAL DISEASE): ICD-10-CM

## 2018-05-15 DIAGNOSIS — I50.42 CHRONIC COMBINED SYSTOLIC AND DIASTOLIC HEART FAILURE: ICD-10-CM

## 2018-05-15 DIAGNOSIS — N18.5 CKD STAGE 5 SECONDARY TO HYPERTENSION: ICD-10-CM

## 2018-05-15 DIAGNOSIS — I12.0 CKD STAGE 5 SECONDARY TO HYPERTENSION: ICD-10-CM

## 2018-05-15 DIAGNOSIS — I10 UNCONTROLLED HYPERTENSION: ICD-10-CM

## 2018-05-15 DIAGNOSIS — Z79.899 POLYPHARMACY: ICD-10-CM

## 2018-05-15 DIAGNOSIS — I47.19 ATRIAL TACHYCARDIA: ICD-10-CM

## 2018-05-15 DIAGNOSIS — I27.20 PULMONARY HTN: Primary | ICD-10-CM

## 2018-05-15 LAB
ALBUMIN SERPL BCP-MCNC: 2.3 G/DL
ALP SERPL-CCNC: 135 U/L
ALT SERPL W/O P-5'-P-CCNC: 55 U/L
ANION GAP SERPL CALC-SCNC: 13 MMOL/L
AST SERPL-CCNC: 39 U/L
BASOPHILS # BLD AUTO: 0.02 K/UL
BASOPHILS NFR BLD: 0.3 %
BILIRUB SERPL-MCNC: 0.4 MG/DL
BUN SERPL-MCNC: 54 MG/DL
CALCIUM SERPL-MCNC: 9.5 MG/DL
CHLORIDE SERPL-SCNC: 99 MMOL/L
CO2 SERPL-SCNC: 26 MMOL/L
CREAT SERPL-MCNC: 8.3 MG/DL
DIFFERENTIAL METHOD: ABNORMAL
EOSINOPHIL # BLD AUTO: 0.1 K/UL
EOSINOPHIL NFR BLD: 2.2 %
ERYTHROCYTE [DISTWIDTH] IN BLOOD BY AUTOMATED COUNT: 16.2 %
EST. GFR  (AFRICAN AMERICAN): 8 ML/MIN/1.73 M^2
EST. GFR  (NON AFRICAN AMERICAN): 6.9 ML/MIN/1.73 M^2
GLUCOSE SERPL-MCNC: 78 MG/DL
HCT VFR BLD AUTO: 31 %
HGB BLD-MCNC: 9.5 G/DL
IMM GRANULOCYTES # BLD AUTO: 0.01 K/UL
IMM GRANULOCYTES NFR BLD AUTO: 0.2 %
LYMPHOCYTES # BLD AUTO: 1.1 K/UL
LYMPHOCYTES NFR BLD: 18 %
MAGNESIUM SERPL-MCNC: 2.1 MG/DL
MCH RBC QN AUTO: 33.2 PG
MCHC RBC AUTO-ENTMCNC: 30.6 G/DL
MCV RBC AUTO: 108 FL
MONOCYTES # BLD AUTO: 1.1 K/UL
MONOCYTES NFR BLD: 19.2 %
NEUTROPHILS # BLD AUTO: 3.6 K/UL
NEUTROPHILS NFR BLD: 60.1 %
NRBC BLD-RTO: 0 /100 WBC
PLATELET # BLD AUTO: 176 K/UL
PMV BLD AUTO: 10.9 FL
POTASSIUM SERPL-SCNC: 4.8 MMOL/L
PROT SERPL-MCNC: 8.6 G/DL
RBC # BLD AUTO: 2.86 M/UL
SODIUM SERPL-SCNC: 138 MMOL/L
WBC # BLD AUTO: 5.9 K/UL

## 2018-05-15 PROCEDURE — 85025 COMPLETE CBC W/AUTO DIFF WBC: CPT

## 2018-05-15 PROCEDURE — 80053 COMPREHEN METABOLIC PANEL: CPT

## 2018-05-15 PROCEDURE — 99214 OFFICE O/P EST MOD 30 MIN: CPT | Mod: S$PBB,,, | Performed by: INTERNAL MEDICINE

## 2018-05-15 PROCEDURE — 83735 ASSAY OF MAGNESIUM: CPT

## 2018-05-15 PROCEDURE — 99214 OFFICE O/P EST MOD 30 MIN: CPT | Mod: PBBFAC | Performed by: INTERNAL MEDICINE

## 2018-05-15 PROCEDURE — 99999 PR PBB SHADOW E&M-EST. PATIENT-LVL IV: CPT | Mod: PBBFAC,,, | Performed by: INTERNAL MEDICINE

## 2018-05-15 PROCEDURE — 36415 COLL VENOUS BLD VENIPUNCTURE: CPT

## 2018-05-15 NOTE — LETTER
May 15, 2018        Gus Hurst  [address] [address]  Phone: [phone]  Fax: [phone]             [facility]  Annalee Stafford  [facility] [address]  Phone: [phone]   Patient: Isidro White   MR Number: [id number]   YOB: 1970   Date of Visit: 5/15/2018       Dear Dr. Gus Hurst    Thank you for referring Isidro White to me for evaluation. Attached you will find relevant portions of my assessment and plan of care.    If you have questions, please do not hesitate to call me. I look forward to following Isidro White along with you.    Sincerely,    Lorelei Braun MD    Enclosure    If you would like to receive this communication electronically, please contact [email or web address] or [phone] to request [employer] access.    [employer] is a tool which provides read-only access to select patient information with whom you have a relationship. Its easy to use and provides real time access to review your patients record including encounter summaries, notes, results, and demographic information.    If you feel you have received this communication in error or would no longer like to receive these types of communications, please e-mail [email or web address]

## 2018-05-15 NOTE — PROGRESS NOTES
Subjective:    Patient ID:  Isidro White is a 48 y.o. male who presents for evaluation of Pulmonary Hypertension.    HPI  48 yo with  ASD closure at age 8 ([facility]), NICM, s/p AVR with a 22 mm mechanical valve and TV annuloplasty with a 28 mm ring 3/17, HTN, SVT, EP performed RFA (3/13/17) and has been on HD since his surgery, referred by Dr Hurst (Nephrology) for evaluation and management of PH as he is being considered for kidney transplant  Pt says growing up he did well- played sports, ran track, like a normal kid- until high school and college when he started drinking- was dx'd with HTN in 2000 was treated with hctz and he wasn't eating right, so he needed more medication and still didn't get better because of his diet- only stopped drinking about 1.5yr ago- lost his insurance and couldn't afford to go to the doctor, so he couldn't get his meds anymore- wound up at [facility] walk in clinic and his BP was 200/100- got admitted- ultimately had a cath (see below)  And underwent AVR/TVR by Dr Gastelum Since his surgery, after he recovered, has had been feeling great- remains on HD-       Since last visit pt was to undergo RHC but when he came his INR was too high (says he didn't realize he was having a procedure and so he didn't hold it and bridge with lovenox as he has in the past) Was in the hospital for 2 weeks with fluid around his lungs about 2 mo ago ([facility])- says he had 2 chest tubes, says they were considering pleurodesis but that they couldn't do it as the lung was collapsed.  Since then he says he's been feeling fine, but worries the fluid will come back. Says he doesn't have any problems with his breathing, but feels tightness under his left arm where the fluid was and he worries because it started with tightness in his back unde rhis arm before the SOB came. Says they were trying to pull the fluid off with HD but it wasn't coming off and that made him sick too.   Now he can do everything he  wants to do without getting SOB. No swelling, orthopnea or PND. Thinks they are pulling too much with HD lately as he has had cramps at the end of a treatment. Cont to make urine but says it's decreased a lot.        Echo 2/25/18 at [facility]   A large left and a left pleural effusion was visualized.  · Aortic Valve: There is a mechanical valve present. Prosthetic Aortic   Valve function is normal.  · Tricuspid Valve: Repaired tricuspid valve with annuloplasty ring. Mild   tricuspid regurgitation. PAP 44 There is an annular ring prosthesis   present.  · Left Ventricle: Normal left ventricle cavity size. Low normal (50-55%)   ejection fraction. Concentric hypertrophy observed.       Cath 2/17  1. Coronary angiography.      a.     Left main widely patent.      b.     LAD widely patent.      c.     Ramus widely patent and massive in size.      d.     Circumflex widely patent with a small OM 1 and OM 2 branch.      e.     Right coronary widely patent.  2. Hemodynamics:  Right heart catheterization.      a.     Pulmonary capillary wedge pressure 33 at end-expiration.      b.     Pulmonary artery pressure 78/42.      c.     Right ventricular pressure 70/21.      d.     Right atrial pressure 22 with a peak V-wave of 24.      e.     Cardiac output by thermodilution is between 6 and 7       L/minute, by Patricia 6.1 L/minute.    CONCLUSION  1. Normal coronary arteries.  2. Pulmonary hypertension.  3. Severe aortic insufficiency.    Echo        Results for orders placed or performed in visit on 06/05/17   2D echo with color flow doppler   Result Value Ref Range    EF 55 55 - 65    Diastolic Dysfunction Yes (A)     Est. PA Systolic Pressure 67.9 (A)     Pericardial Effusion SMALL (A)     Tricuspid Valve Regurgitation MODERATE (A)      1 - Concentric hypertrophy.     2 - No wall motion abnormalities.     3 - Normal left ventricular systolic function (EF 55-60%).     4 - Biatrial enlargement.     5 - Restrictive LV filling pattern,  indicating markedly elevated LAP (grade 3 diastolic dysfunction).     6 - Right ventricular enlargement with hypertrophy, with mildly depressed systolic function.     7 - Pulmonary hypertension. The estimated PA systolic pressure is greater than 68 mmHg.     8 - Aortic valve prosthesis, effective prosthetic valve area corrected for BSA is 0.7 cm2.     9 - Moderate tricuspid regurgitation.     10 - Small pericardial effusion.     11 - There is an increased density to the myocardium, suggesting a myocardial infiltrative process (clinical correlation required).     12 -  LV GLS is low, indicating impaired LV contractility.  Clinical correlation is required..     ECG 6/17  Normal sinus rhythm  Left axis deviation  LVH with QRS widening and repolarization abnormality  Abnormal ECG    CXR  3 / 14   /17  Postthoracentesis.  Central venous catheter remain.  Cardiomegaly.  Opacification of the right lung base probably atelectatic changes and small amount of pleural effusion.  The left lung is clear.    CT Chest   2 /17     Aorta maintains normal caliber, contour and course.  Calcification of the aortic valve is present.  There is also bulky calcification in the cardiac fibrous skeleton at the junction of aortic and mitral bowels.  LEFT ventricle and RIGHT atrium appear enlarged but detail is limited on this study performed without intravenous contrast medium and without gating.    2. Retrosternal structures were reviewed in this patient with planned repeat sternotomy.      - There are seven sternal sutures, the first three at the level of the manubrium.      - There is minimal fat  the posterior table of the manubrium from the anterior margin of the LEFT innominate vein.      - At least 1 cm of fat separates the sternomanubrial junction from the anterior wall of the distal ascending aorta.  The aorta remains well removed from the posterior table of the sternum inferiorly.      - However the anterior wall of the  "RIGHT ventricle is closely opposed to the posterior table of the sternum between the fourth and fifth sternal sutures extending inferiorly past the seventh sternal suture.  This finding is especially well seen on sagittal series 602 image 39.    3. Solitary 0.3 cm pulmonary nodule within the posterior basal aspect of the left lower lobe (axial series 2, image 32). Per Fleischner Society guidelines; in a low risk patient, no follow up is needed. In a high risk patient (e.g. history of smoking or malignancy), those guidelines recommend 12 month CT chest follow up to assess for stability 2/2018.    4. Dilated hepatic veins suggest elevated RIGHT heart pressures, tricuspid insufficiency or both.    5. Moderate volume abdominal ascites which is incompletely visualized.  Etiology of the intraperitoneal fluid is unclear aerated may be related to elevated RIGHT heart pressures.    PFTs   2 / 17   /  Mild (small airways) obstruction. Severe restriction. MVV is severely decreased. Normal oximetry          Review of Systems   Constitution: Negative for chills, fever, malaise/fatigue and weight gain.   HENT: Negative.    Eyes: Negative.    Cardiovascular: Negative for chest pain, dyspnea on exertion, leg swelling, near-syncope, orthopnea, palpitations, paroxysmal nocturnal dyspnea and syncope.   Respiratory: Negative for cough and shortness of breath.    Endocrine: Negative.    Skin: Negative.    Musculoskeletal: Negative.    Gastrointestinal: Negative for bloating, abdominal pain and change in bowel habit.   Neurological: Negative for dizziness and light-headedness.   Psychiatric/Behavioral: Negative for depression.        Objective:  /75   Pulse 78   Ht 6' 2" (1.88 m)   Wt 82 kg (180 lb 12.4 oz)   SpO2 98%   BMI 23.21 kg/m²       Physical Exam   Constitutional: He is oriented to person, place, and time. He appears well-developed and well-nourished.   HENT:   Head: Normocephalic and atraumatic.   Eyes: Right eye " exhibits no discharge. Left eye exhibits no discharge.   Neck: Neck supple. No JVD present. No thyromegaly present.   Cardiovascular: Normal rate and regular rhythm.  Exam reveals no gallop and no friction rub.    No murmur heard.       Pulmonary/Chest: Effort normal and breath sounds normal. No respiratory distress. He has no wheezes. He has no rales.   Abdominal: Soft. Bowel sounds are normal. He exhibits distension. There is no tenderness.   Mild ascites   Musculoskeletal: Normal range of motion. He exhibits no edema or tenderness.   Neurological: He is alert and oriented to person, place, and time. No cranial nerve deficit. Coordination normal.   Skin: Skin is warm and dry. No rash noted.   Psychiatric: He has a normal mood and affect. Judgment and thought content normal.           Chemistry        Component Value Date/Time     12/21/2017 0848    K 4.6 12/21/2017 0848    CL 98 12/21/2017 0848    CO2 28 12/21/2017 0848    BUN 39 (H) 12/21/2017 0848    CREATININE 8.7 (H) 12/21/2017 0848    GLU 78 12/21/2017 0848        Component Value Date/Time    CALCIUM 9.9 12/21/2017 0848    ALKPHOS 87 12/21/2017 0848    AST 24 12/21/2017 0848    ALT 54 (H) 12/21/2017 0848    BILITOT 0.6 12/21/2017 0848    ESTGFRAFRICA 7.6 (A) 12/21/2017 0848    EGFRNONAA 6.6 (A) 12/21/2017 0848            Magnesium   Date Value Ref Range Status   09/21/2017 2.0 1.6 - 2.6 mg/dL Final       Lab Results   Component Value Date    WBC 5.90 05/15/2018    HGB 9.5 (L) 05/15/2018    HCT 31.0 (L) 05/15/2018     (H) 05/15/2018     05/15/2018       Lab Results   Component Value Date    INR 1.9 (H) 03/19/2017    INR 1.8 (H) 03/18/2017    INR 1.7 (H) 03/17/2017       BNP   Date Value Ref Range Status   03/09/2017 1,292 (H) 0 - 99 pg/mL Final     Comment:     Values of less than 100 pg/ml are consistent with non-CHF populations.       LD   Date Value Ref Range Status   03/09/2017 249 110 - 260 U/L Final     Comment:     Results are  increased in hemolyzed samples.             Assessment:       1. Pulmonary HTN- in setting of restrictive filling, HTN, ESRD, valvular heart disease- had mild RVE and RVH on echo, PAP do not appear to have changed much after AVR/TVR by echo. euvolemic on exam, no BNP drawn again today   2. S/P AVR (aortic valve replacement)    3. ESRD (end stage renal disease)    4. History of radiofrequency ablation for complex right atrial arrhythmia    5. Nonischemic cardiomyopathy    6. Hypertensive cardiomegaly with heart failure    7. Adult congenital heart disease    8. Chronic combined systolic and diastolic heart failure    9. Atrial tachycardia      WHO Group:2  Functional Class 2     Plan:       Will try again to schedule RHC- pt to hold coumadin 5 nights and bridge with lovenox- will hold lovenox am of cath- he knows to call his coumadin clinic and let them know the date of cath    Keep salt intake to under 2000 mg sodium, fluids to under 2 L (64 oz)    Pt to take all of his usual meds am of RHC

## 2018-05-15 NOTE — PATIENT INSTRUCTIONS
Lets try again to do a right heart catheterization to measure the blood pressure in your lungs-  Take your usual medicines and eat a light breakfast that am.  Labs on 2nd floor- then go to third [facility] cath lab waiting area and check in    HOLD coumadin five nights before and bridge with lovenox injections- hold the lovenox the morning of the cath, will restart it that night    Call your coumadin clinic today and let them know the date of the procedure so they can set up the lovenox bridge and check your INR before the procedure- ideally we would like it between 1.5-2.0      Keep salt intake to under 2000 mg sodium, fluids to under 2 L (64 oz)

## 2018-06-14 ENCOUNTER — DOCUMENTATION ONLY (OUTPATIENT)
Dept: TRANSPLANT | Facility: CLINIC | Age: 48
End: 2018-06-14

## 2018-06-14 ENCOUNTER — HOSPITAL ENCOUNTER (OUTPATIENT)
Facility: HOSPITAL | Age: 48
Discharge: HOME | End: 2018-06-14
Attending: INTERNAL MEDICINE | Admitting: INTERNAL MEDICINE
Payer: MEDICAID

## 2018-06-14 PROCEDURE — C1894 INTRO/SHEATH, NON-LASER: HCPCS

## 2018-06-14 PROCEDURE — 93451 RIGHT HEART CATH: CPT | Mod: 26,,, | Performed by: INTERNAL MEDICINE

## 2018-06-14 PROCEDURE — 25000003 PHARM REV CODE 250

## 2018-06-14 NOTE — H&P
Subjective:      Isidro White is a 48 y.o. male with a who needs RHC for evaluation of PA pressures prior to renal transplant .  Currently able to lay flat.  Echo PA pressure of 68 in June 2017 in our system  Last RHC prior to AVR with a 22 mm mechanical valve and TV annuloplasty with a 28 mm ring and RFA on 3/13/17 showed PA 78 /42  Was told to hold coumadin and bridge for lovenox       Past Medical History:   Diagnosis Date    Cardiomyopathy     LVEF 33%    Drug-induced erectile dysfunction 8/7/2017    ESRD due to hypertension     HD M, W, F    Hypertension     Stenosis of aortic and mitral valves     AS    Supraventricular tachycardia     atrial tachycardia    Tachycardia     Valvular regurgitation     AI, TR     Past Surgical History:   Procedure Laterality Date    ASD REPAIR      age 8 [facility]    CARDIAC CATHETERIZATION      [facility]     CARDIAC VALVE SURGERY  02/2017    22 mm Medtronic AV, 28 mm TV Medtronic annuloplaty ring    RADIOFREQUENCY ABLATION  03/2017    Atrial tachycardia     Social History     Social History    Marital status: Single     Spouse name: N/A    Number of children: N/A    Years of education: N/A     Occupational History    Not on file.     Social History Main Topics    Smoking status: Never Smoker    Smokeless tobacco: Not on file    Alcohol use No      Comment: no longer drinks since surgery    Drug use: No    Sexual activity: Not on file     Other Topics Concern    Not on file     Social History Narrative    No narrative on file     Family History   Problem Relation Age of Onset    Cancer Mother     Heart attack Father            Other significant clinical information:  Review of patient's allergies indicates:  No Known Allergies  No current facility-administered medications on file prior to encounter.      Current Outpatient Prescriptions on File Prior to Encounter   Medication Sig    acetaminophen (TYLENOL) 325 MG tablet Take 2 tablets  (650 mg total) by mouth every 8 (eight) hours as needed.    amLODIPine (NORVASC) 10 MG tablet Take 1 tablet (10 mg total) by mouth once daily.    ascorbic acid, vitamin C, (VITAMIN C) 500 MG tablet Take 1 tablet (500 mg total) by mouth 2 (two) times daily.    atorvastatin (LIPITOR) 40 MG tablet Take 40 mg by mouth once daily.    calcium acetate (PHOSLO) 667 mg capsule Take 1 capsule (667 mg total) by mouth 3 (three) times daily.    colchicine 0.6 mg tablet Take a half tablet every 7 days.    docusate sodium (COLACE) 100 MG capsule Take 1 capsule (100 mg total) by mouth 2 (two) times daily.    epoetin vikki (PROCRIT) 10,000 unit/mL injection Inject 1 mL (10,000 Units total) into the skin every Mon, Wed, Fri. To be given with HD    furosemide (LASIX) 80 MG tablet Take 1 tablet (80 mg total) by mouth 2 (two) times daily.    hydrALAZINE (APRESOLINE) 100 MG tablet TAKE ONE TABLET BY MOUTH THREE TIMES DAILY FOR  HYPERTENSION    HYDROmorphone (DILAUDID) 2 MG tablet Take 1 tablet (2 mg total) by mouth every 4 (four) hours as needed.    hydrOXYzine HCl (ATARAX) 25 MG tablet Take 1 tablet (25 mg total) by mouth 3 (three) times daily as needed for Itching.    lisinopril (PRINIVIL,ZESTRIL) 40 MG tablet TAKE ONE TABLET BY MOUTH EVERY DAY    metoprolol succinate (TOPROL-XL) 100 MG 24 hr tablet Take two and half tablets daily    metoprolol succinate (TOPROL-XL) 200 MG 24 hr tablet TAKE ONE TABLET BY MOUTH EVERY DAY    omega-3 fatty acids-vitamin E (FISH OIL) 1,000 mg Cap Take 1 capsule by mouth once daily.    oxycodone (ROXICODONE) 5 MG immediate release tablet Take 1 tablet (5 mg total) by mouth every 4 (four) hours as needed.    sildenafil (REVATIO) 20 mg Tab Take 2-5 tablets every 72 hours as needed for Erectile Dysfunction.Do not exceed 100 mg in 72 hours    warfarin (COUMADIN) 7.5 MG tablet Take 1 tablet (7.5 mg total) by mouth Daily.            Objective:      Physical Exam   / 77 HR 83    General  Appearance:    Alert, cooperative, no distress, appears stated age   Head:    Normocephalic, without obvious abnormality, atraumatic   Eyes:    PERRL, conjunctiva/corneas clear, EOM's intact, fundi     benign, both eyes        Ears:    Normal TM's and external ear canals, both ears   Nose:   Nares normal, septum midline, mucosa normal, no drainage    or sinus tenderness   Throat:   Lips, mucosa, and tongue normal; teeth and gums normal   Neck:   Supple, symmetrical, trachea midline, no adenopathy;        thyroid:  No enlargement/tenderness/nodules; no carotid    bruit or JVD   Back:     Symmetric, no curvature, ROM normal, no CVA tenderness   Lungs:     Clear to auscultation bilaterally, respirations unlabored   Chest wall:    No tenderness or deformity   Heart:    Regular rate and rhythm, S1 and S2 normal, no murmur, rub   or gallop   Abdomen:     Soft, non-tender, bowel sounds active all four quadrants,     no masses, no organomegaly   Genitalia:    Normal male without lesion, discharge or tenderness   Rectal:    Normal tone, normal prostate, no masses or tenderness;    guaiac negative stool   Extremities:   Extremities normal, atraumatic, no cyanosis or edema   Pulses:   2+ and symmetric all extremities   Skin:   Skin color, texture, turgor normal, no rashes or lesions   Lymph nodes:   Cervical, supraclavicular, and axillary nodes normal   Neurologic:   CNII-XII intact. Normal strength, sensation and reflexes       throughout         Lab Review   Lab Results   Component Value Date    WBC 5.34 06/14/2018    HGB 12.9 (L) 06/14/2018    HCT 40.0 06/14/2018     (H) 06/14/2018     06/14/2018     Lab Results   Component Value Date    INR 1.5 (H) 06/14/2018    INR 1.9 (H) 03/19/2017    INR 1.8 (H) 03/18/2017           Assessment:       Plan:     I have explained the risks, benefits, and alternatives of the procedure in detail.  The patient expresses understanding and all questions have been answered.  The  patient agrees to the proceed as planned.  RHC  via RIJ as RIJ appears patent by ultrasound   Micropuncture access needle will be used to minimize bleeding risk.

## 2018-06-14 NOTE — PROGRESS NOTES
Pt underwent RHC today showing normal cardiac output, normal filling pressures and MILD PH with mean pap 32 mmHg.    Pt is at low-moderate but acceptable risk for kidney transplant and no further cardiac testing is required.    Would recommend careful attention be paid to maintaining bp control and euvolemic volume status

## 2018-06-14 NOTE — DISCHARGE INSTRUCTIONS
OK to resume coumadin and lovenox as directed by coumadin clinic      AFTER THE PROCEDURE:  -You may remove the bandage in 24 hours and wash with soap and water.  -You may shower, but do not soak in a tub for three days.   PRECAUTIONS FOR THE NEXT 24 HOURS:  -If you need to cough, sneeze, have a bowel movement, or bear down, hold pressure over your bandage.  -Do not  anything heavier than a gallon of milk(about 5 pounds)  -Avoid excessive bending over.  SYMPTOMS TO WATCH FOR AND REPORT TO YOUR DOCTOR:  -BLEEDING: hold pressure over the site until bleeding stops. Proceed to Emergency Room by ambulance (do not drive yourself) if unable to stop bleeding. Notify your doctor.  -HEMATOMA(hard bruise under the skin): Kenny around the bruise if one develops. Call your doctor if it increases in size or if you have difficulty talking, swallowing, breathing or anything unusual.  SIGNS OF INFECTION:Fever (temperature over 100.5 F), pus or redness  -RASH  -CHEST PAIN OR SHORTNESS OF BREATH    You may call you coordinator in the Heart Failure/Heart Transplant/Pulmonary Hypertension Clinic at [phone] during normal business hours(Monday through Friday from 8 A.M. to 5 P.M.) After hours, call the Heart Transplant Service doctor on call at [phone].

## 2018-06-14 NOTE — DISCHARGE SUMMARY
Admit 06/14/2018  Discharge  06/14/2018   Discharge / Principle diagnosis pulmonary hypertension.    Discharge attending Alexandro Lassiter MD  Hospital course.  Came in for RHC. Tolerated procedure well (see full results in cardiac procedure section).  Results discussed with patient / caregiver.   Discharge condition / disposition Good / home   discharge activity activity as tolerated    Discharge diet diet as tolerated / unchanged from admission  Discharge medication   No current facility-administered medications on file prior to encounter.      Current Outpatient Prescriptions on File Prior to Encounter   Medication Sig    acetaminophen (TYLENOL) 325 MG tablet Take 2 tablets (650 mg total) by mouth every 8 (eight) hours as needed.    amLODIPine (NORVASC) 10 MG tablet Take 1 tablet (10 mg total) by mouth once daily.    ascorbic acid, vitamin C, (VITAMIN C) 500 MG tablet Take 1 tablet (500 mg total) by mouth 2 (two) times daily.    atorvastatin (LIPITOR) 40 MG tablet Take 40 mg by mouth once daily.    calcium acetate (PHOSLO) 667 mg capsule Take 1 capsule (667 mg total) by mouth 3 (three) times daily.    colchicine 0.6 mg tablet Take a half tablet every 7 days.    docusate sodium (COLACE) 100 MG capsule Take 1 capsule (100 mg total) by mouth 2 (two) times daily.    epoetin vikki (PROCRIT) 10,000 unit/mL injection Inject 1 mL (10,000 Units total) into the skin every Mon, Wed, Fri. To be given with HD    furosemide (LASIX) 80 MG tablet Take 1 tablet (80 mg total) by mouth 2 (two) times daily.    hydrALAZINE (APRESOLINE) 100 MG tablet TAKE ONE TABLET BY MOUTH THREE TIMES DAILY FOR  HYPERTENSION    HYDROmorphone (DILAUDID) 2 MG tablet Take 1 tablet (2 mg total) by mouth every 4 (four) hours as needed.    hydrOXYzine HCl (ATARAX) 25 MG tablet Take 1 tablet (25 mg total) by mouth 3 (three) times daily as needed for Itching.    lisinopril (PRINIVIL,ZESTRIL) 40 MG tablet TAKE ONE TABLET BY MOUTH EVERY DAY     metoprolol succinate (TOPROL-XL) 100 MG 24 hr tablet Take two and half tablets daily    metoprolol succinate (TOPROL-XL) 200 MG 24 hr tablet TAKE ONE TABLET BY MOUTH EVERY DAY    omega-3 fatty acids-vitamin E (FISH OIL) 1,000 mg Cap Take 1 capsule by mouth once daily.    oxycodone (ROXICODONE) 5 MG immediate release tablet Take 1 tablet (5 mg total) by mouth every 4 (four) hours as needed.    sildenafil (REVATIO) 20 mg Tab Take 2-5 tablets every 72 hours as needed for Erectile Dysfunction.Do not exceed 100 mg in 72 hours    warfarin (COUMADIN) 7.5 MG tablet Take 1 tablet (7.5 mg total) by mouth Daily.     Followup in clinic as scheduled

## 2018-07-03 RX ORDER — VIT B COMP NO.3/FOLIC/C/BIOTIN 1 MG-60 MG
TABLET ORAL
Qty: 90 TABLET | Refills: 2 | Status: SHIPPED | OUTPATIENT
Start: 2018-07-03 | End: 2019-04-03 | Stop reason: SDUPTHER

## 2018-07-19 ENCOUNTER — TELEPHONE (OUTPATIENT)
Dept: NEPHROLOGY | Facility: CLINIC | Age: 48
End: 2018-07-19

## 2018-07-19 DIAGNOSIS — R00.0 TACHYCARDIA: Primary | ICD-10-CM

## 2018-07-19 NOTE — TELEPHONE ENCOUNTER
----- Message from Era Astudillo sent at 7/19/2018  3:22 PM CDT -----  Contact: Era  If this pt is already an est pt the nurse in your dept has to over ride and schedule pt an appt, if this is new pt then you have to give the pt the medicaid number which is [phone], I have no problem scheduling a pt but if pt is already est then the nurse of the Dr who is referring the pt has to  over ride

## 2018-07-20 ENCOUNTER — TELEPHONE (OUTPATIENT)
Dept: TRANSPLANT | Facility: CLINIC | Age: 48
End: 2018-07-20

## 2018-07-20 NOTE — TELEPHONE ENCOUNTER
----- Message from Tomas Rowland MD sent at 7/19/2018  3:39 PM CDT -----  Sounds reasonable. Thanks very much    J  ----- Message -----  From: Gus Hurst MD  Sent: 7/19/2018   9:32 AM  To: Tomas Rowland MD, Lorelei Braun MD, #    Had his C recently, do you think we shd re evaluate for renal txp, Pt's request     thx    Gus Hurst MD

## 2018-07-20 NOTE — TELEPHONE ENCOUNTER
----- Message from Lorelei Braun MD sent at 7/19/2018  4:40 PM CDT -----  No contraindication from my standpoint.    ----- Message -----  From: Gus Hurst MD  Sent: 7/19/2018   9:32 AM  To: Tomas Rowland MD, Lorelei Braun MD, #    Had his C recently, do you think we shd re evaluate for renal txp, Pt's request     thx    Gus Hurst MD

## 2018-07-26 ENCOUNTER — TELEPHONE (OUTPATIENT)
Dept: CARDIOLOGY | Facility: CLINIC | Age: 48
End: 2018-07-26

## 2018-07-26 NOTE — TELEPHONE ENCOUNTER
----- Message from Lubna Burdick sent at 7/26/2018 11:22 AM CDT -----  Patient state his appt was canceled with provider on today at [facility] and was rescheduled for the [place] location. Patient state he rides with transportation and they are advising they could not bring him to his appt due to the last minute changes. Patient calling to schedule a new appt. Patient have Medicaid insurance. Please adv/call patient at [phone].//cw

## 2018-07-26 NOTE — TELEPHONE ENCOUNTER
Returned pt call appt has been rescheduled I offered pt appt tomorrow states he will keep appt scheduled with Dr. Gudino for 8/9/18 [facility].    I have sent pt appt letter in mail.

## 2018-08-08 DIAGNOSIS — I10 ESSENTIAL HYPERTENSION: Primary | ICD-10-CM

## 2018-08-09 ENCOUNTER — TELEPHONE (OUTPATIENT)
Dept: TRANSPLANT | Facility: CLINIC | Age: 48
End: 2018-08-09

## 2018-08-09 ENCOUNTER — OFFICE VISIT (OUTPATIENT)
Dept: CARDIOLOGY | Facility: CLINIC | Age: 48
End: 2018-08-09
Payer: MEDICAID

## 2018-08-09 ENCOUNTER — CLINICAL SUPPORT (OUTPATIENT)
Dept: CARDIOLOGY | Facility: CLINIC | Age: 48
End: 2018-08-09
Payer: MEDICAID

## 2018-08-09 VITALS
HEART RATE: 77 BPM | SYSTOLIC BLOOD PRESSURE: 120 MMHG | DIASTOLIC BLOOD PRESSURE: 70 MMHG | HEIGHT: 74 IN | BODY MASS INDEX: 26.73 KG/M2 | WEIGHT: 208.31 LBS

## 2018-08-09 DIAGNOSIS — Q24.9 ADULT CONGENITAL HEART DISEASE: ICD-10-CM

## 2018-08-09 DIAGNOSIS — I50.42 CHRONIC COMBINED SYSTOLIC AND DIASTOLIC HEART FAILURE: ICD-10-CM

## 2018-08-09 DIAGNOSIS — N18.6 ESRD (END STAGE RENAL DISEASE): ICD-10-CM

## 2018-08-09 DIAGNOSIS — Z95.2 S/P AVR (AORTIC VALVE REPLACEMENT): ICD-10-CM

## 2018-08-09 DIAGNOSIS — I10 ESSENTIAL HYPERTENSION: ICD-10-CM

## 2018-08-09 DIAGNOSIS — Z98.890 HISTORY OF RADIOFREQUENCY ABLATION FOR COMPLEX RIGHT ATRIAL ARRHYTHMIA: ICD-10-CM

## 2018-08-09 DIAGNOSIS — I10 UNCONTROLLED HYPERTENSION: ICD-10-CM

## 2018-08-09 DIAGNOSIS — I35.0 NONRHEUMATIC AORTIC VALVE STENOSIS: ICD-10-CM

## 2018-08-09 DIAGNOSIS — I42.8 NONISCHEMIC CARDIOMYOPATHY: ICD-10-CM

## 2018-08-09 DIAGNOSIS — I11.0 HYPERTENSIVE CARDIOMEGALY WITH HEART FAILURE: Primary | ICD-10-CM

## 2018-08-09 DIAGNOSIS — D63.8 ANEMIA OF CHRONIC DISEASE: ICD-10-CM

## 2018-08-09 DIAGNOSIS — I47.19 ATRIAL TACHYCARDIA: ICD-10-CM

## 2018-08-09 PROCEDURE — 93005 ELECTROCARDIOGRAM TRACING: CPT | Mod: PBBFAC | Performed by: INTERNAL MEDICINE

## 2018-08-09 PROCEDURE — 93010 ELECTROCARDIOGRAM REPORT: CPT | Mod: S$PBB,,, | Performed by: INTERNAL MEDICINE

## 2018-08-09 PROCEDURE — 99213 OFFICE O/P EST LOW 20 MIN: CPT | Mod: PBBFAC | Performed by: INTERNAL MEDICINE

## 2018-08-09 PROCEDURE — 99214 OFFICE O/P EST MOD 30 MIN: CPT | Mod: S$PBB,,, | Performed by: INTERNAL MEDICINE

## 2018-08-09 PROCEDURE — 99999 PR PBB SHADOW E&M-EST. PATIENT-LVL III: CPT | Mod: PBBFAC,,, | Performed by: INTERNAL MEDICINE

## 2018-08-09 RX ORDER — LABETALOL 100 MG/1
100 TABLET, FILM COATED ORAL DAILY
COMMUNITY
End: 2019-03-25 | Stop reason: SDUPTHER

## 2018-08-09 RX ORDER — DILTIAZEM HYDROCHLORIDE 120 MG/1
120 CAPSULE, COATED, EXTENDED RELEASE ORAL DAILY
COMMUNITY
Start: 2018-07-18 | End: 2018-09-12

## 2018-08-09 RX ORDER — PANTOPRAZOLE SODIUM 40 MG/1
40 TABLET, DELAYED RELEASE ORAL DAILY
COMMUNITY
End: 2018-10-02 | Stop reason: SDUPTHER

## 2018-08-09 NOTE — PROGRESS NOTES
Subjective:   Patient ID:  Isidro White is a 48 y.o. male who presents for cardiac consult of Hypertension      HPI  The patient came in today for cardiac consult of Hypertension     8/9/28    This is a 48 year old male pt with ASD closure at age 8 ([facility]), NICM, s/p AVR with a 22 mm mechanical valve and TV annuloplasty with a 28 mm ring 3/17, HTN, SVT, EP performed RFA (3/13/17) and has been on HD - MWF since Feb 8,2016 since his surgery, referred by Dr Hurst (Nephrology) for evaluation and management of PH as he is being considered for kidney transplant, seen by Dr. Braun in [facility].     He had a RHC 6/18 showing normal cardiac output, normal filling pressures and MILD PH with mean pap 32 mmHg.    Pt feels very weak on HD days. After RHC HR has been in 109-110 range, started on cardizem. HR now normal. Pt felt palpitations now feels ok.   8/9/18 ECG - NSR, PVCs, V rate 77, lateral TWI    Patient feels  no chest pain, no sob, no leg swelling, no PND, no palpitation, no dizziness, no syncope, no CNS symptoms.    Patient has fairly good exercise tolerance.    Patient is compliant with medications.      Echo 2/25/18 at [facility]   A large left and a left pleural effusion was visualized.  · Aortic Valve: There is a mechanical valve present. Prosthetic Aortic   Valve function is normal.  · Tricuspid Valve: Repaired tricuspid valve with annuloplasty ring. Mild   tricuspid regurgitation. PAP 44 There is an annular ring prosthesis   present.  · Left Ventricle: Normal left ventricle cavity size. Low normal (50-55%)   ejection fraction. Concentric hypertrophy observed.         Cath 2/17  1. Coronary angiography.      a.     Left main widely patent.      b.     LAD widely patent.      c.     Ramus widely patent and massive in size.      d.     Circumflex widely patent with a small OM 1 and OM 2 branch.      e.     Right coronary widely patent.  2. Hemodynamics:  Right heart catheterization.      a.      Pulmonary capillary wedge pressure 33 at end-expiration.      b.     Pulmonary artery pressure 78/42.      c.     Right ventricular pressure 70/21.      d.     Right atrial pressure 22 with a peak V-wave of 24.      e.     Cardiac output by thermodilution is between 6 and 7       L/minute, by Patricia 6.1 L/minute.    CONCLUSION  1. Normal coronary arteries.  2. Pulmonary hypertension.  3. Severe aortic insufficiency.      2D ECHO 6/20/17  CONCLUSIONS     1 - Concentric hypertrophy.     2 - No wall motion abnormalities.     3 - Normal left ventricular systolic function (EF 55-60%).     4 - Biatrial enlargement.     5 - Restrictive LV filling pattern, indicating markedly elevated LAP (grade 3 diastolic dysfunction).     6 - Right ventricular enlargement with hypertrophy, with mildly depressed systolic function.     7 - Pulmonary hypertension. The estimated PA systolic pressure is greater than 68 mmHg.     8 - Aortic valve prosthesis, effective prosthetic valve area corrected for BSA is 0.7 cm2.     9 - Moderate tricuspid regurgitation.     10 - Small pericardial effusion.     11 - There is an increased density to the myocardium, suggesting a myocardial infiltrative process (clinical correlation required).     12 -  LV GLS is low, indicating impaired LV contractility.  Clinical correlation is required..       This document has been electronically    SIGNED BY: Migdalia Antony MD On: 06/20/2017 10:33  Past Medical History:   Diagnosis Date    Cardiomyopathy     LVEF 33%    Drug-induced erectile dysfunction 8/7/2017    ESRD due to hypertension     HD M, W, F    Hypertension     Stenosis of aortic and mitral valves     AS    Supraventricular tachycardia     atrial tachycardia    Tachycardia     Valvular regurgitation     AI, TR       Past Surgical History:   Procedure Laterality Date    [facility]      [facility] 8 [facility]    [facility]TER[facility]ATION      [facility]     CARDIAC VALVE SURGERY  02/2017    22 mm  Medtronic AV, 28 mm TV Medtronic annuloplaty ring    RADIOFREQUENCY ABLATION  03/2017    Atrial tachycardia       Social History   Substance Use Topics    Smoking status: Never Smoker    Smokeless tobacco: Not on file    Alcohol use 0.6 oz/week     1 Glasses of wine per week      Comment: day       Family History   Problem Relation Age of Onset    Cancer Mother     Heart attack Father        Patient's Medications   New Prescriptions    No medications on file   Previous Medications    ACETAMINOPHEN (TYLENOL) 325 MG TABLET    Take 2 tablets (650 mg total) by mouth every 8 (eight) hours as needed.    AMLODIPINE (NORVASC) 10 MG TABLET    Take 1 tablet (10 mg total) by mouth once daily.    ASCORBIC ACID, VITAMIN C, (VITAMIN C) 500 MG TABLET    Take 1 tablet (500 mg total) by mouth 2 (two) times daily.    ATORVASTATIN (LIPITOR) 40 MG TABLET    Take 40 mg by mouth once daily.    CALCIUM ACETATE (PHOSLO) 667 MG CAPSULE    Take 1 capsule (667 mg total) by mouth 3 (three) times daily.    COLCHICINE 0.6 MG TABLET    Take a half tablet every 7 days.    DILTIAZEM (CARDIZEM CD) 120 MG CP24    Take 120 mg by mouth once daily. evening    DOCUSATE SODIUM (COLACE) 100 MG CAPSULE    Take 1 capsule (100 mg total) by mouth 2 (two) times daily.    EPOETIN TORRIE (PROCRIT) 10,000 UNIT/ML INJECTION    Inject 1 mL (10,000 Units total) into the skin every Mon, Wed, Fri. To be given with HD    FUROSEMIDE (LASIX) 80 MG TABLET    Take 1 tablet (80 mg total) by mouth 2 (two) times daily.    HYDRALAZINE (APRESOLINE) 100 MG TABLET    TAKE ONE TABLET BY MOUTH THREE TIMES DAILY FOR  HYPERTENSION    HYDROXYZINE HCL (ATARAX) 25 MG TABLET    Take 1 tablet (25 mg total) by mouth 3 (three) times daily as needed for Itching.    LABETALOL (NORMODYNE) 100 MG TABLET    Take 100 mg by mouth once daily. If heart rate greater than 120    LISINOPRIL (PRINIVIL,ZESTRIL) 40 MG TABLET    TAKE ONE TABLET BY MOUTH EVERY DAY    METOPROLOL SUCCINATE (TOPROL-XL) 200  "MG 24 HR TABLET    TAKE ONE TABLET BY MOUTH EVERY DAY    OMEGA-3 FATTY ACIDS-VITAMIN E (FISH OIL) 1,000 MG CAP    Take 1 capsule by mouth once daily.    PANTOPRAZOLE (PROTONIX) 40 MG TABLET    Take 40 mg by mouth once daily.    ETHAN-RACQUEL RX 1- MG-MG-MCG TAB    TAKE ONE TABLET BY MOUTH EVERY DAY    SILDENAFIL (REVATIO) 20 MG TAB    Take 2-5 tablets every 72 hours as needed for Erectile Dysfunction.Do not exceed 100 mg in 72 hours    WARFARIN (COUMADIN) 7.5 MG TABLET    Take 1 tablet (7.5 mg total) by mouth Daily.   Modified Medications    No medications on file   Discontinued Medications    HYDROMORPHONE (DILAUDID) 2 MG TABLET    Take 1 tablet (2 mg total) by mouth every 4 (four) hours as needed.    METOPROLOL SUCCINATE (TOPROL-XL) 100 MG 24 HR TABLET    Take two and half tablets daily    OXYCODONE (ROXICODONE) 5 MG IMMEDIATE RELEASE TABLET    Take 1 tablet (5 mg total) by mouth every 4 (four) hours as needed.       Review of Systems   Constitutional: Positive for malaise/fatigue.   HENT: Negative.    Eyes: Negative.    Respiratory: Positive for shortness of breath.    Cardiovascular: Positive for palpitations. Negative for chest pain.   Gastrointestinal: Negative.    Genitourinary: Negative.    Musculoskeletal: Negative.    Skin: Negative.    Neurological: Negative.    Endo/Heme/Allergies: Negative.    Psychiatric/Behavioral: Negative.    All 12 systems otherwise negative.      Wt Readings from Last 3 Encounters:   08/09/18 94.5 kg (208 lb 5.4 oz)   05/15/18 82 kg (180 lb 12.4 oz)   09/21/17 84.4 kg (186 lb)     Temp Readings from Last 3 Encounters:   09/21/17 99 °F (37.2 °C)   03/19/17 98.6 °F (37 °C) (Oral)   02/21/17 99 °F (37.2 °C) (Oral)     BP Readings from Last 3 Encounters:   08/09/18 120/70   05/15/18 127/75   09/21/17 139/89     Pulse Readings from Last 3 Encounters:   08/09/18 77   05/15/18 78   09/21/17 78       /70 (BP Method: Small (Manual))   Pulse 77   Ht 6' 2" (1.88 m)   Wt 94.5 kg " (208 lb 5.4 oz)   BMI 26.75 kg/m²     Objective:   Physical Exam   Constitutional: He is oriented to person, place, and time. He appears well-developed and well-nourished. No distress.   HENT:   Head: Normocephalic and atraumatic.   Nose: Nose normal.   Mouth/Throat: Oropharynx is clear and moist.   Eyes: Conjunctivae and EOM are normal. No scleral icterus.   Neck: Normal range of motion. Neck supple. No JVD present. No thyromegaly present.   Cardiovascular: Normal rate, regular rhythm, S1 normal and S2 normal.  Exam reveals no gallop, no S3, no S4 and no friction rub.    Murmur heard.   Midsystolic murmur is present  at the upper right sternal border  Spink S2   Pulmonary/Chest: Effort normal and breath sounds normal. No stridor. No respiratory distress. He has no wheezes. He has no rales. He exhibits no tenderness.   Abdominal: Soft. Bowel sounds are normal. He exhibits no distension and no mass. There is no tenderness. There is no rebound.   Genitourinary:   Genitourinary Comments: Deferred   Musculoskeletal: Normal range of motion. He exhibits no edema, tenderness or deformity.   Lymphadenopathy:     He has no cervical adenopathy.   Neurological: He is alert and oriented to person, place, and time. He exhibits normal muscle tone. Coordination normal.   Skin: Skin is warm and dry. No rash noted. He is not diaphoretic. No erythema. No pallor.   Psychiatric: He has a normal mood and affect. His behavior is normal. Judgment and thought content normal.   Nursing note and vitals reviewed.      Lab Results   Component Value Date     06/14/2018    K 4.3 06/14/2018     06/14/2018    CO2 30 (H) 06/14/2018    BUN 49 (H) 06/14/2018    CREATININE 10.5 (H) 06/14/2018    GLU 94 06/14/2018    MG 2.1 05/15/2018     (H) 06/14/2018     (H) 06/14/2018    ALBUMIN 2.5 (L) 06/14/2018    PROT 9.2 (H) 06/14/2018    BILITOT 0.6 06/14/2018    WBC 5.34 06/14/2018    HGB 12.9 (L) 06/14/2018    HCT 40.0 06/14/2018     HCT 25 (L) 02/08/2017     (H) 06/14/2018     06/14/2018    INR 1.5 (H) 06/14/2018    CHOL 188 09/21/2017    HDL 42 09/21/2017    LDLCALC 122.4 09/21/2017    TRIG 118 09/21/2017    BNP 1,292 (H) 03/09/2017     Assessment:      1. Hypertensive cardiomegaly with heart failure    2. Nonrheumatic aortic valve stenosis    3. Adult congenital heart disease    4. S/P AVR (aortic valve replacement)    5. Chronic combined systolic and diastolic heart failure    6. Nonischemic cardiomyopathy    7. Atrial tachycardia    8. History of radiofrequency ablation for complex right atrial arrhythmia    9. Uncontrolled hypertension    10. ESRD (end stage renal disease)    11. Anemia of chronic disease        Plan:   1. NICM   - recovered EF  - cont low salt diet  - pt euvolemic     2. S/p AVR  - cont coumadin    3. ESRD  - cont HD    4. Pre-OP CV evaluation prior to renal transplant  Low/moderate periop risk of CV events for moderate risk procedure.  Good functional and exercise capacity.  No chest pain, active arrhythmia and CHF symptoms.  Ok to proceed to the scheduled surgery without further cardiac study.  Continue Metoprolol and Statin periop.  Avoid periop fluid overload.    5. HTN  - cont meds    6. Tachycardia  - cont BB and CCB  - Holter to evaluate     7. HLD  - cont statin      Thank you for allowing me to participate in this patient's care. Please do not hesitate to contact me with any questions or concerns. Consult note has been forwarded to the referral physician.

## 2018-08-31 RX ORDER — FUROSEMIDE 80 MG/1
TABLET ORAL
Qty: 30 TABLET | Refills: 9 | Status: SHIPPED | OUTPATIENT
Start: 2018-08-31 | End: 2020-02-18

## 2018-09-04 ENCOUNTER — CLINICAL SUPPORT (OUTPATIENT)
Dept: CARDIOLOGY | Facility: CLINIC | Age: 48
End: 2018-09-04
Attending: INTERNAL MEDICINE
Payer: MEDICAID

## 2018-09-04 DIAGNOSIS — I47.19 ATRIAL TACHYCARDIA: ICD-10-CM

## 2018-09-04 DIAGNOSIS — Z98.890 HISTORY OF RADIOFREQUENCY ABLATION FOR COMPLEX RIGHT ATRIAL ARRHYTHMIA: ICD-10-CM

## 2018-09-04 PROCEDURE — 93226 XTRNL ECG REC<48 HR SCAN A/R: CPT | Mod: PBBFAC | Performed by: INTERNAL MEDICINE

## 2018-09-04 PROCEDURE — 93227 XTRNL ECG REC<48 HR R&I: CPT | Mod: S$PBB,,, | Performed by: INTERNAL MEDICINE

## 2018-09-12 ENCOUNTER — TELEPHONE (OUTPATIENT)
Dept: CARDIOLOGY | Facility: CLINIC | Age: 48
End: 2018-09-12

## 2018-09-12 RX ORDER — DILTIAZEM HYDROCHLORIDE 240 MG/1
240 CAPSULE, COATED, EXTENDED RELEASE ORAL DAILY
Qty: 3 CAPSULE | Refills: 90 | Status: SHIPPED | OUTPATIENT
Start: 2018-09-12 | End: 2018-09-14 | Stop reason: CLARIF

## 2018-09-12 NOTE — TELEPHONE ENCOUNTER
Spoke with patient regarding his abnormal result. Significant extra beats in top and bottom chambers, I doubled Cardizem to 240mg start tonight and  meds from pharmacy. Will repeat Holter later. Patient denies any questions/concerns.  Instructed to notify office should any questions/concerns arise.  Patient verbalized understanding.

## 2018-09-14 DIAGNOSIS — I10 ESSENTIAL HYPERTENSION: Primary | ICD-10-CM

## 2018-09-14 RX ORDER — DILTIAZEM HYDROCHLORIDE 240 MG/1
240 CAPSULE, COATED, EXTENDED RELEASE ORAL DAILY
Qty: 90 CAPSULE | Refills: 3 | Status: SHIPPED | OUTPATIENT
Start: 2018-09-14 | End: 2019-01-28

## 2018-09-14 RX ORDER — DILTIAZEM HYDROCHLORIDE 240 MG/1
240 CAPSULE, COATED, EXTENDED RELEASE ORAL DAILY
COMMUNITY
End: 2018-09-14 | Stop reason: SDUPTHER

## 2018-09-14 NOTE — TELEPHONE ENCOUNTER
Spoke with Lubna at [facility] to correct Diltiazem Rx.  Corrected to 90 tablets with 3 refills from 3 tabs with 90 refills.

## 2018-10-02 RX ORDER — PANTOPRAZOLE SODIUM 40 MG/1
TABLET, DELAYED RELEASE ORAL
Qty: 30 TABLET | Refills: 9 | Status: SHIPPED | OUTPATIENT
Start: 2018-10-02 | End: 2019-08-24 | Stop reason: SDUPTHER

## 2018-10-02 RX ORDER — LISINOPRIL 40 MG/1
TABLET ORAL
Qty: 30 TABLET | Refills: 9 | Status: SHIPPED | OUTPATIENT
Start: 2018-10-02 | End: 2019-10-03 | Stop reason: SDUPTHER

## 2018-10-12 ENCOUNTER — TELEPHONE (OUTPATIENT)
Dept: TRANSPLANT | Facility: CLINIC | Age: 48
End: 2018-10-12

## 2018-10-12 NOTE — TELEPHONE ENCOUNTER
Nurse spoke with the patient and informed him that he was denied due to lack of medicare part D coverage. A new referral was received but if he does not have the proper medication coverage he will be denied again. Open enrollment starts now and you will need to apply for that coverage. There is a certain time for open enrollment and if he does not enroll her will have to wait until next year to apply. Patient verbalized understanding of the above information. All questions answered. A copy of the denial letter fax to the dialysis unit per the patient's request.

## 2018-10-12 NOTE — TELEPHONE ENCOUNTER
----- Message from Consuelo Hewitt sent at 10/12/2018  3:36 PM CDT -----      ----- Message -----  From: Aparna Simmons  Sent: 10/12/2018  11:40 AM  To: [facility] Pre-Kidney Transplant Non-Clinical    Pt calling to get status of him being placed on xplant list or getting appts scheduled    pls call pt with information    Pt contact [phone]

## 2018-10-15 ENCOUNTER — TELEPHONE (OUTPATIENT)
Dept: TRANSPLANT | Facility: CLINIC | Age: 48
End: 2018-10-15

## 2018-10-15 NOTE — TELEPHONE ENCOUNTER
Nurse spoke with the patient and his nurse at the dialysis unit and informed them both that the patient was denied back in Aug due to lack of Medicare part D insurance. They were informed that he will need to enroll in order to be approved for kidney transplant evaluation. The nurse verbalized understanding of the above information and states that the she will inform the  when she returns on next week. Both the patient and nurse verbalized understanding of the above information. All questions answered.

## 2018-10-15 NOTE — TELEPHONE ENCOUNTER
----- Message from Coco Joy sent at 10/15/2018  9:22 AM CDT -----  Contact: Patient  Needs Advice    Reason for call: Pt is requesting to speak with Ryan concerning insurance         Communication Preference:  [phone]    Additional Information: n/a

## 2018-10-25 ENCOUNTER — TELEPHONE (OUTPATIENT)
Dept: TRANSPLANT | Facility: CLINIC | Age: 48
End: 2018-10-25

## 2018-10-25 NOTE — TELEPHONE ENCOUNTER
Nurse spoke with the patient and he states that his medication coverage won't take affect until next July 2019. The patient was informed that the financial counselor will submit the information to the insurance company and if he does not have the proper coverage we will deny him and he can be re referred in July 2019. Patient verbalized understanding of the above information. All questions answered.

## 2018-10-25 NOTE — TELEPHONE ENCOUNTER
----- Message from Coco Joy sent at 10/25/2018 10:50 AM CDT -----  Contact: patirnt  Needs Advice    Reason for call: Pt would like to speak with coordinator (did not state what the issue was)        Communication Preference: [phone]    Additional Information: n/a

## 2018-11-02 ENCOUNTER — TELEPHONE (OUTPATIENT)
Dept: TRANSPLANT | Facility: CLINIC | Age: 48
End: 2018-11-02

## 2018-11-02 NOTE — TELEPHONE ENCOUNTER
----- Message from Yoli Gutierrez sent at 11/2/2018  8:46 AM CDT -----  Contact: Patient  Needs Advice    Reason for call: Patient along with SW at [facility] called to speak to Ryan. Patient did not express what the concerns were about but only stated they have been trying to get in contact with her.         Communication Preference: Patient contact: [phone]    Additional Information: n/a

## 2018-11-02 NOTE — TELEPHONE ENCOUNTER
Nurse spoke with the patient and informed them both that the referral was received and that the financial counselor will review his information to make sure he has the proper insurance. Once cleared we will call him to schedule. If not he can reapply once he has the proper coverage.  verbalized understanding of the above information and states she will make sure the patient understands the above information. All questions answered.    20 gauge

## 2018-11-08 ENCOUNTER — TELEPHONE (OUTPATIENT)
Dept: TRANSPLANT | Facility: CLINIC | Age: 48
End: 2018-11-08

## 2018-11-20 DIAGNOSIS — Z76.82 ORGAN TRANSPLANT CANDIDATE: Primary | ICD-10-CM

## 2018-12-13 ENCOUNTER — OFFICE VISIT (OUTPATIENT)
Dept: CARDIOLOGY | Facility: CLINIC | Age: 48
End: 2018-12-13
Payer: COMMERCIAL

## 2018-12-13 VITALS — HEIGHT: 74 IN | HEART RATE: 80 BPM | BODY MASS INDEX: 27.7 KG/M2 | WEIGHT: 215.81 LBS | OXYGEN SATURATION: 98 %

## 2018-12-13 DIAGNOSIS — Z95.2 S/P AVR (AORTIC VALVE REPLACEMENT): ICD-10-CM

## 2018-12-13 DIAGNOSIS — I10 ESSENTIAL HYPERTENSION: ICD-10-CM

## 2018-12-13 DIAGNOSIS — Q24.9 ADULT CONGENITAL HEART DISEASE: ICD-10-CM

## 2018-12-13 DIAGNOSIS — Z91.89 AT RISK FOR CORONARY ARTERY DISEASE: ICD-10-CM

## 2018-12-13 DIAGNOSIS — Z98.890 HISTORY OF RADIOFREQUENCY ABLATION FOR COMPLEX RIGHT ATRIAL ARRHYTHMIA: ICD-10-CM

## 2018-12-13 DIAGNOSIS — N18.6 ESRD (END STAGE RENAL DISEASE): ICD-10-CM

## 2018-12-13 DIAGNOSIS — I35.0 NONRHEUMATIC AORTIC VALVE STENOSIS: ICD-10-CM

## 2018-12-13 DIAGNOSIS — I49.3 PVC'S (PREMATURE VENTRICULAR CONTRACTIONS): ICD-10-CM

## 2018-12-13 DIAGNOSIS — R31.9 HEMATURIA, UNSPECIFIED TYPE: Primary | ICD-10-CM

## 2018-12-13 DIAGNOSIS — I11.0 HYPERTENSIVE CARDIOMEGALY WITH HEART FAILURE: ICD-10-CM

## 2018-12-13 DIAGNOSIS — I50.42 CHRONIC COMBINED SYSTOLIC AND DIASTOLIC HEART FAILURE: ICD-10-CM

## 2018-12-13 PROCEDURE — 99999 PR PBB SHADOW E&M-EST. PATIENT-LVL III: CPT | Mod: PBBFAC,TXP,, | Performed by: INTERNAL MEDICINE

## 2018-12-13 PROCEDURE — 99215 OFFICE O/P EST HI 40 MIN: CPT | Mod: S$GLB,TXP,, | Performed by: INTERNAL MEDICINE

## 2018-12-13 PROCEDURE — 99213 OFFICE O/P EST LOW 20 MIN: CPT | Mod: PBBFAC,TXP | Performed by: INTERNAL MEDICINE

## 2018-12-13 NOTE — PROGRESS NOTES
Subjective:   Patient ID:  Isidro White is a 48 y.o. male who presents for cardiac consult of Hypertension      Hypertension   Associated symptoms include malaise/fatigue. Pertinent negatives include no chest pain, palpitations or shortness of breath.     The patient came in today for cardiac consult of Hypertension    This is a 48 year old male pt with ASD closure at age 8 ([facility]), NICM, s/p AVR with a 22 mm mechanical valve and TV annuloplasty with a 28 mm ring 3/17, HTN, SVT, EP performed RFA (3/13/17) and has been on HD - MWF since Feb 8,2016 since his surgery, referred by Dr Hurst (Nephrology) for evaluation and management of PH as he is being considered for kidney transplant, seen by Dr. Braun in [facility].     8/9/28  He had a RHC 6/18 showing normal cardiac output, normal filling pressures and MILD PH with mean pap 32 mmHg.  Pt feels very weak on HD days. After RHC HR has been in 109-110 range, started on cardizem. HR now normal. Pt felt palpitations now feels ok.     12/13/18  Holter from 9/4/18 revealed freq PVCs, dilt increased to 240 mg. No palpitations. Has been doing well lately, BP is well controlled. Labwork from HD has been normal. Still makes some urine, says he has some hematuria. Will need annual stress and 2D echo. He is also undergoing workup for HepB will see Dr. Youngblood.     Patient feels no chest pain, no sob, no leg swelling, no PND, no palpitation, no dizziness, no syncope, no CNS symptoms.    Patient has fairly good exercise tolerance.    Patient is compliant with medications.      8/9/18 ECG - NSR, PVCs, V rate 77, lateral TWI      9/4/18 HOLTER  TEST DESCRIPTION   PREDOMINANT RHYTHM  1. Sinus rhythm with heart rates varying between 69 and 124 bpm with an average of 85 bpm.   VENTRICULAR ARRHYTHMIAS  1. There were frequent PVCs totalling 1738 and averaging 36 per hour.  There were 9 bigeminal cycles.  There were 3 triplets.   2. There was one (6 beat) run of  non-sustained ventricular tachycardia. The rate was 125 bpm.     SUPRA VENTRICULAR ARRHYTHMIAS  1. There were occasional PACs totalling 995 and averaging 20 per hour.  There were 9 triplets.   2. There were 4 (12 beat) runs of non-sustained SVT. The rate was 145 bpm.           Echo 2/25/18 at [facility]   A large left and a left pleural effusion was visualized.  · Aortic Valve: There is a mechanical valve present. Prosthetic Aortic   Valve function is normal.  · Tricuspid Valve: Repaired tricuspid valve with annuloplasty ring. Mild   tricuspid regurgitation. PAP 44 There is an annular ring prosthesis   present.  · Left Ventricle: Normal left ventricle cavity size. Low normal (50-55%)   ejection fraction. Concentric hypertrophy observed.         Cath 2/17  1. Coronary angiography.      a.     Left main widely patent.      b.     LAD widely patent.      c.     Ramus widely patent and massive in size.      d.     Circumflex widely patent with a small OM 1 and OM 2 branch.      e.     Right coronary widely patent.  2. Hemodynamics:  Right heart catheterization.      a.     Pulmonary capillary wedge pressure 33 at end-expiration.      b.     Pulmonary artery pressure 78/42.      c.     Right ventricular pressure 70/21.      d.     Right atrial pressure 22 with a peak V-wave of 24.      e.     Cardiac output by thermodilution is between 6 and 7       L/minute, by Patricia 6.1 L/minute.    CONCLUSION  1. Normal coronary arteries.  2. Pulmonary hypertension.  3. Severe aortic insufficiency.      2D ECHO 6/20/17  CONCLUSIONS     1 - Concentric hypertrophy.     2 - No wall motion abnormalities.     3 - Normal left ventricular systolic function (EF 55-60%).     4 - Biatrial enlargement.     5 - Restrictive LV filling pattern, indicating markedly elevated LAP (grade 3 diastolic dysfunction).     6 - Right ventricular enlargement with hypertrophy, with mildly depressed systolic function.     7 - Pulmonary hypertension. The estimated PA  systolic pressure is greater than 68 mmHg.     8 - Aortic valve prosthesis, effective prosthetic valve area corrected for BSA is 0.7 cm2.     9 - Moderate tricuspid regurgitation.     10 - Small pericardial effusion.     11 - There is an increased density to the myocardium, suggesting a myocardial infiltrative process (clinical correlation required).     12 -  LV GLS is low, indicating impaired LV contractility.  Clinical correlation is required..       Past Medical History:   Diagnosis Date    Asthma     Atrial fibrillation     Atrial flutter     Cardiomyopathy     LVEF 33%    CHF (congestive heart failure)     Drug-induced erectile dysfunction 8/7/2017    ESRD due to hypertension     HD M, W, F    Hypertension     Stenosis of aortic and mitral valves     AS    Supraventricular tachycardia     atrial tachycardia    Tachycardia     Valvular regurgitation     AI, TR       Past Surgical History:   Procedure Laterality Date    ABLATION N/A 3/13/2017    Performed by Nigel Guallpa [facility] at [facility] CATH LAB    ASD REPAIR      age 8 [facility]    CARDIAC [facility]HETERIZATION      [facility]     CARDIAC VALVE SURGERY  02/2017    22 mm Medtronic AV, 28 mm TV Medtronic annuloplaty ring    [id number] Accuseal Left 3/16/2017    Performed by Alcides Manning MD at [facility] OR 2ND FLR    RADIOFREQUENCY ABLATION  03/2017    Atrial tachycardia    REDO STERNOTOMY WITH AORTIC VALVE REPLACEMENT/redo sternotomy N/A 2/8/2017    Performed by Jose Gastelum MD at [facility] OR 2ND FLR    REPAIR-VALVE-TRICUSPID  2/8/2017    Performed by Jose [facility]elum [facility] at [facility] OR 2ND FLR    REPLACEMENT-VALVE-AORTIC N/A 2/8/2017    Performed by Jose [facility]astelum MD at [facility] OR 2ND FLR       Social History     Tobacco Use    Smoking status: Never Smoker   Substance Use Topics    Alcohol use: Yes     Alcohol/week: 0.6 oz     Types: 1 Glasses of wine per week     Comment: day    Drug use: No       Family History    Problem Relation Age of Onset    Cancer Mother     Heart attack Father           Medication List           Accurate as of 12/13/18  8:57 AM. If you have any questions, ask your nurse or doctor.               CONTINUE taking these medications    amLODIPine 10 MG tablet  Commonly known as:  NORVASC  Take 1 tablet (10 mg total) by mouth once daily.     ascorbic acid (vitamin C) 500 MG tablet  Commonly known as:  VITAMIN C  Take 1 tablet (500 mg total) by mouth 2 (two) times daily.     atorvastatin 40 MG tablet  Commonly known as:  LIPITOR     calcium acetate 667 mg capsule  Commonly known as:  PHOSLO  Take 1 capsule (667 mg total) by mouth 3 (three) times daily.     diltiaZEM 240 MG 24 hr capsule  Commonly known as:  CARDIZEM CD  Take 1 capsule (240 mg total) by mouth once daily.     epoetin vikki 10,000 unit/mL injection  Commonly known as:  PROCRIT  Inject 1 mL (10,000 Units total) into the skin every Mon, Wed, Fri. To be given with HD     furosemide 80 MG tablet  Commonly known as:  LASIX  TAKE ONE TABLET BY MOUTH EVERY DAY     hydrALAZINE 100 MG tablet  Commonly known as:  APRESOLINE  TAKE ONE TABLET BY MOUTH THREE TIMES DAILY FOR  HYPERTENSION     hydrOXYzine HCl 25 MG tablet  Commonly known as:  ATARAX  Take 1 tablet (25 mg total) by mouth 3 (three) times daily as needed for Itching.     labetalol 100 MG tablet  Commonly known as:  NORMODYNE     lisinopril 40 MG tablet  Commonly known as:  PRINIVIL,ZESTRIL  TAKE ONE TABLET BY MOUTH EVERY DAY     metoprolol succinate 200 MG 24 hr tablet  Commonly known as:  TOPROL-XL  TAKE ONE TABLET BY MOUTH EVERY DAY     omega-3 fatty acids-vitamin E 1,000 mg Cap  Commonly known as:  FISH OIL  Take 1 capsule by mouth once daily.     pantoprazole 40 MG tablet  Commonly known as:  PROTONIX  TAKE ONE TABLET BY MOUTH EVERY DAY     ETHAN-RACQUEL RX 1- mg-mg-mcg Tab  Generic drug:  vit b cmplx 3-fa-vit c-biotin 1- mg-mg-mcg  TAKE ONE TABLET BY MOUTH EVERY DAY     warfarin  "7.5 MG tablet  Commonly known as:  COUMADIN  Take 1 tablet (7.5 mg total) by mouth Daily.        STOP taking these medications    acetaminophen 325 MG tablet  Commonly known as:  TYLENOL  Stopped by:  Fuad Gudino Md, MD     colchicine 0.6 mg tablet  Commonly known as:  COLCRYS  Stopped by:  Fuad Gudino Md, MD     docusate sodium 100 MG capsule  Commonly known as:  COLACE  Stopped by:  Fuad Gudino Md, MD     sildenafil 20 mg Tab  Commonly known as:  REVATIO  Stopped by:  Fuad Gudino Md, MD            Review of Systems   Constitutional: Positive for malaise/fatigue.   HENT: Negative.    Eyes: Negative.    Respiratory: Negative for shortness of breath.    Cardiovascular: Negative for chest pain and palpitations.   Gastrointestinal: Negative.    Genitourinary: Positive for hematuria.   Musculoskeletal: Negative.    Skin: Negative.    Neurological: Negative.    Endo/Heme/Allergies: Negative.    Psychiatric/Behavioral: Negative.    All 12 systems otherwise negative.      Wt Readings from Last 3 Encounters:   12/13/18 97.9 kg (215 lb 13.3 oz)   08/09/18 94.5 kg (208 lb 5.4 oz)   05/15/18 82 kg (180 lb 12.4 oz)     Temp Readings from Last 3 Encounters:   09/21/17 99 °F (37.2 °C)   03/19/17 98.6 °F (37 °C) (Oral)   02/21/17 99 °F (37.2 °C) (Oral)     BP Readings from Last 3 Encounters:   08/09/18 120/70   05/15/18 127/75   09/21/17 139/89     Pulse Readings from Last 3 Encounters:   12/13/18 80   08/09/18 77   05/15/18 78       Pulse 80   Ht 6' 2" (1.88 m)   Wt 97.9 kg (215 lb 13.3 oz)   SpO2 98%   BMI 27.71 kg/m²  /72    Objective:   Physical Exam   Constitutional: He is oriented to person, place, and time. He appears well-developed and well-nourished. No distress.   HENT:   Head: Normocephalic and atraumatic.   Nose: Nose normal.   Mouth/Throat: Oropharynx is clear and moist.   Eyes: Conjunctivae and EOM are normal. No scleral icterus.   Neck: Normal range of motion. Neck supple. No JVD present. No " thyromegaly present.   Cardiovascular: Normal rate, regular rhythm, S1 normal and S2 normal. Exam reveals no gallop, no S3, no S4 and no friction rub.   Murmur heard.   Midsystolic murmur is present at the upper right sternal border.  Estill S2   Pulmonary/Chest: Effort normal and breath sounds normal. No stridor. No respiratory distress. He has no wheezes. He has no rales. He exhibits no tenderness.   Abdominal: Soft. Bowel sounds are normal. He exhibits no distension and no mass. There is no tenderness. There is no rebound.   Genitourinary:   Genitourinary Comments: Deferred   Musculoskeletal: Normal range of motion. He exhibits no edema, tenderness or deformity.   Lymphadenopathy:     He has no cervical adenopathy.   Neurological: He is alert and oriented to person, place, and time. He exhibits normal muscle tone. Coordination normal.   Skin: Skin is warm and dry. No rash noted. He is not diaphoretic. No erythema. No pallor.   Psychiatric: He has a normal mood and affect. His behavior is normal. Judgment and thought content normal.   Nursing note and vitals reviewed.      Lab Results   Component Value Date     06/14/2018    K 4.3 06/14/2018     06/14/2018    CO2 30 (H) 06/14/2018    BUN 49 (H) 06/14/2018    CREATININE 10.5 (H) 06/14/2018    GLU 94 06/14/2018    MG 2.1 05/15/2018     (H) 06/14/2018     (H) 06/14/2018    ALBUMIN 2.5 (L) 06/14/2018    PROT 9.2 (H) 06/14/2018    BILITOT 0.6 06/14/2018    WBC 5.34 06/14/2018    HGB 12.9 (L) 06/14/2018    HCT 40.0 06/14/2018    HCT 25 (L) 02/08/2017     (H) 06/14/2018     06/14/2018    INR 1.5 (H) 06/14/2018    CHOL 188 09/21/2017    HDL 42 09/21/2017    LDLCALC 122.4 09/21/2017    TRIG 118 09/21/2017    BNP 1,292 (H) 03/09/2017     Assessment:      1. Hematuria, unspecified type    2. Essential hypertension    3. Hypertensive cardiomegaly with heart failure    4. Nonrheumatic aortic valve stenosis    5. S/P AVR (aortic valve  replacement)    6. Adult congenital heart disease    7. History of radiofrequency ablation for complex right atrial arrhythmia    8. Chronic combined systolic and diastolic heart failure    9. ESRD (end stage renal disease)    10. At risk for coronary artery disease    11. PVC's (premature ventricular contractions)        Plan:   1. Chronic Diastolic HF  - recovered EF  - cont low salt diet  - pt euvolemic     2. S/p AVR  - cont coumadin  - f/u with coumadin clinic at [facility] \[facility] a [facility] and Outlook.\""    3. ESRD  - cont HD    4. Pre-OP CV evaluation prior to renal transplant  - Nuclear pharm stress and 2D ECHO ordered    5. HTN  - cont meds    6. Tachycardia sec to PVCs  - cont BB and CCB  - repeat Holter    7. HLD  - cont statin    8. Hematuria  - refer to urology  - discussed need to continue coumadin    Thank you for allowing me to participate in this patient's care. Please do not hesitate to contact me with any questions or concerns. Consult note has been forwarded to the referral physician.

## 2019-01-08 ENCOUNTER — HOSPITAL ENCOUNTER (OUTPATIENT)
Dept: RADIOLOGY | Facility: HOSPITAL | Age: 49
Discharge: HOME | End: 2019-01-08
Attending: NURSE PRACTITIONER
Payer: COMMERCIAL

## 2019-01-08 ENCOUNTER — TELEPHONE (OUTPATIENT)
Dept: TRANSPLANT | Facility: CLINIC | Age: 49
End: 2019-01-08

## 2019-01-08 ENCOUNTER — OFFICE VISIT (OUTPATIENT)
Dept: TRANSPLANT | Facility: CLINIC | Age: 49
End: 2019-01-08
Payer: COMMERCIAL

## 2019-01-08 VITALS
HEART RATE: 84 BPM | HEIGHT: 72 IN | WEIGHT: 219.38 LBS | TEMPERATURE: 98 F | BODY MASS INDEX: 29.71 KG/M2 | DIASTOLIC BLOOD PRESSURE: 90 MMHG | SYSTOLIC BLOOD PRESSURE: 157 MMHG | RESPIRATION RATE: 16 BRPM | OXYGEN SATURATION: 97 %

## 2019-01-08 DIAGNOSIS — Z95.2 S/P AVR (AORTIC VALVE REPLACEMENT): ICD-10-CM

## 2019-01-08 DIAGNOSIS — I47.19 ATRIAL TACHYCARDIA: ICD-10-CM

## 2019-01-08 DIAGNOSIS — N02.0 IDIOPATHIC HEMATURIA WITH MINOR GLOMERULAR ABNORMALITY: ICD-10-CM

## 2019-01-08 DIAGNOSIS — I42.8 NONISCHEMIC CARDIOMYOPATHY: ICD-10-CM

## 2019-01-08 DIAGNOSIS — Z76.82 ORGAN TRANSPLANT CANDIDATE: ICD-10-CM

## 2019-01-08 DIAGNOSIS — N18.6 ESRD ON DIALYSIS: Primary | Chronic | ICD-10-CM

## 2019-01-08 DIAGNOSIS — Q24.9 ADULT CONGENITAL HEART DISEASE: ICD-10-CM

## 2019-01-08 DIAGNOSIS — R76.8 HEPATITIS B ANTIBODY POSITIVE: Primary | ICD-10-CM

## 2019-01-08 DIAGNOSIS — I27.9 CHRONIC PULMONARY HEART DISEASE: ICD-10-CM

## 2019-01-08 DIAGNOSIS — I50.42 CHRONIC COMBINED SYSTOLIC AND DIASTOLIC HEART FAILURE: ICD-10-CM

## 2019-01-08 DIAGNOSIS — I11.0 HYPERTENSIVE CARDIOMEGALY WITH HEART FAILURE: ICD-10-CM

## 2019-01-08 DIAGNOSIS — I27.20 PULMONARY HTN: ICD-10-CM

## 2019-01-08 DIAGNOSIS — Z98.890 HISTORY OF RADIOFREQUENCY ABLATION FOR COMPLEX RIGHT ATRIAL ARRHYTHMIA: ICD-10-CM

## 2019-01-08 DIAGNOSIS — Z99.2 ESRD ON DIALYSIS: Primary | Chronic | ICD-10-CM

## 2019-01-08 DIAGNOSIS — N25.81 SECONDARY HYPERPARATHYROIDISM OF RENAL ORIGIN: ICD-10-CM

## 2019-01-08 DIAGNOSIS — Z01.818 PRE-TRANSPLANT EVALUATION FOR CKD (CHRONIC KIDNEY DISEASE): ICD-10-CM

## 2019-01-08 DIAGNOSIS — D63.8 ANEMIA OF CHRONIC DISEASE: ICD-10-CM

## 2019-01-08 DIAGNOSIS — I10 ESSENTIAL HYPERTENSION: ICD-10-CM

## 2019-01-08 LAB
BACTERIA #/AREA URNS AUTO: NORMAL /HPF
BILIRUB UR QL STRIP: NEGATIVE
CLARITY UR REFRACT.AUTO: CLEAR
COLOR UR AUTO: YELLOW
CREAT UR-MCNC: 69 MG/DL
GLUCOSE UR QL STRIP: ABNORMAL
HGB UR QL STRIP: NEGATIVE
HYALINE CASTS UR QL AUTO: 0 /LPF
KETONES UR QL STRIP: NEGATIVE
LEUKOCYTE ESTERASE UR QL STRIP: NEGATIVE
MICROSCOPIC COMMENT: NORMAL
NITRITE UR QL STRIP: NEGATIVE
PH UR STRIP: 8 [PH] (ref 5–8)
PROT UR QL STRIP: ABNORMAL
PROT UR-MCNC: 78 MG/DL
PROT/CREAT UR: 1.13 MG/G{CREAT}
RBC #/AREA URNS AUTO: 0 /HPF (ref 0–4)
SP GR UR STRIP: 1.01 (ref 1–1.03)
SQUAMOUS #/AREA URNS AUTO: 0 /HPF
URN SPEC COLLECT METH UR: ABNORMAL
WBC #/AREA URNS AUTO: 0 /HPF (ref 0–5)

## 2019-01-08 PROCEDURE — 76770 US EXAM ABDO BACK WALL COMP: CPT | Mod: 26,TXP,, | Performed by: RADIOLOGY

## 2019-01-08 PROCEDURE — 71046 X-RAY EXAM CHEST 2 VIEWS: CPT | Mod: 26,TXP,, | Performed by: RADIOLOGY

## 2019-01-08 PROCEDURE — 99204 PR OFFICE/OUTPT VISIT, NEW, LEVL IV, 45-59 MIN: ICD-10-PCS | Mod: S$GLB,TXP,, | Performed by: PHYSICIAN ASSISTANT

## 2019-01-08 PROCEDURE — 99215 PR OFFICE/OUTPT VISIT, EST, LEVL V, 40-54 MIN: ICD-10-PCS | Mod: S$GLB,TXP,, | Performed by: INTERNAL MEDICINE

## 2019-01-08 PROCEDURE — 72170 X-RAY EXAM OF PELVIS: CPT | Mod: 26,TXP,, | Performed by: RADIOLOGY

## 2019-01-08 PROCEDURE — 99204 PR OFFICE/OUTPT VISIT, NEW, LEVL IV, 45-59 MIN: ICD-10-PCS | Mod: S$GLB,TXP,, | Performed by: TRANSPLANT SURGERY

## 2019-01-08 PROCEDURE — 99999 PR PBB SHADOW E&M-EST. PATIENT-LVL IV: ICD-10-PCS | Mod: PBBFAC,TXP,, | Performed by: INTERNAL MEDICINE

## 2019-01-08 PROCEDURE — 82570 ASSAY OF URINE CREATININE: CPT | Mod: TXP

## 2019-01-08 PROCEDURE — 71046 X-RAY EXAM CHEST 2 VIEWS: CPT | Mod: TC,FY,TXP

## 2019-01-08 PROCEDURE — 99999 PR PBB SHADOW E&M-EST. PATIENT-LVL IV: CPT | Mod: PBBFAC,TXP,, | Performed by: INTERNAL MEDICINE

## 2019-01-08 PROCEDURE — 71046 XR CHEST PA AND LATERAL: ICD-10-PCS | Mod: 26,TXP,, | Performed by: RADIOLOGY

## 2019-01-08 PROCEDURE — 99215 OFFICE O/P EST HI 40 MIN: CPT | Mod: S$GLB,TXP,, | Performed by: INTERNAL MEDICINE

## 2019-01-08 PROCEDURE — 99204 OFFICE O/P NEW MOD 45 MIN: CPT | Mod: S$GLB,TXP,, | Performed by: PHYSICIAN ASSISTANT

## 2019-01-08 PROCEDURE — 72170 XR PELVIS ROUTINE AP: ICD-10-PCS | Mod: 26,TXP,, | Performed by: RADIOLOGY

## 2019-01-08 PROCEDURE — 72170 X-RAY EXAM OF PELVIS: CPT | Mod: TC,FY,TXP

## 2019-01-08 PROCEDURE — 81001 URINALYSIS AUTO W/SCOPE: CPT | Mod: TXP

## 2019-01-08 PROCEDURE — 76770 US RETROPERITONEAL COMPLETE: ICD-10-PCS | Mod: 26,TXP,, | Performed by: RADIOLOGY

## 2019-01-08 PROCEDURE — 76770 US EXAM ABDO BACK WALL COMP: CPT | Mod: TC,TXP

## 2019-01-08 PROCEDURE — 99204 OFFICE O/P NEW MOD 45 MIN: CPT | Mod: S$GLB,TXP,, | Performed by: TRANSPLANT SURGERY

## 2019-01-08 RX ORDER — ZOLPIDEM TARTRATE 5 MG/1
5 TABLET ORAL NIGHTLY PRN
COMMUNITY
End: 2019-01-29 | Stop reason: SDUPTHER

## 2019-01-08 NOTE — PROGRESS NOTES
Patient has history of hepatitis-B, requesting GI appointment,    Also noticed hematuria in the recent past, will do a CT urogram and also schedule an appointment with Urology.    Dr Hurst

## 2019-01-08 NOTE — PROGRESS NOTES
PHARM.D. PRE-TRANSPLANT NOTE:    This patient's medication therapy was evaluated as part of his pre-transplant evaluation.    The following pharmacologic concerns were noted:   · On warfarin for AVR, goal INR 1.5-2; per patient managed by PCP  · History of A fib, however, no documented amiodarone; caution when considering HCV positive donors  · HBV positive, has not received treatment  · Fish oil may increase risk of marlen-operative bleeding      This patient's medication profile was reviewed for contraindications for DAA Hepatitis C therapy:    [X]  No current inducers of CYP 3A4 or PGP  [X]  No amiodarone on this patient's EMR profile in the last 24 months        Current Outpatient Medications   Medication Sig Dispense Refill    amLODIPine (NORVASC) 10 MG tablet Take 1 tablet (10 mg total) by mouth once daily. 30 tablet 8    ascorbic acid, vitamin C, (VITAMIN C) 500 MG tablet Take 1 tablet (500 mg total) by mouth 2 (two) times daily.      calcium acetate (PHOSLO) 667 mg capsule Take 1 capsule (667 mg total) by mouth 3 (three) times daily. 90 capsule 11    diltiaZEM (CARDIZEM CD) 240 MG 24 hr capsule Take 1 capsule (240 mg total) by mouth once daily. 90 capsule 3    furosemide (LASIX) 80 MG tablet TAKE ONE TABLET BY MOUTH EVERY DAY 30 tablet 9    hydrOXYzine HCl (ATARAX) 25 MG tablet Take 1 tablet (25 mg total) by mouth 3 (three) times daily as needed for Itching. 60 tablet 0    labetalol (NORMODYNE) 100 MG tablet Take 100 mg by mouth once daily. If heart rate greater than 120      lisinopril (PRINIVIL,ZESTRIL) 40 MG tablet TAKE ONE TABLET BY MOUTH EVERY DAY 30 tablet 9    metoprolol succinate (TOPROL-XL) 200 MG 24 hr tablet TAKE ONE TABLET BY MOUTH EVERY DAY 90 tablet 2    omega-3 fatty acids-vitamin E (FISH OIL) 1,000 mg Cap Take 1 capsule by mouth once daily.  0    pantoprazole (PROTONIX) 40 MG tablet TAKE ONE TABLET BY MOUTH EVERY DAY 30 tablet 9    ETHAN-RACQUEL RX 1- mg-mg-mcg Tab TAKE ONE TABLET  BY MOUTH EVERY DAY 90 tablet 2    warfarin (COUMADIN) 7.5 MG tablet Take 1 tablet (7.5 mg total) by mouth Daily. 30 tablet 11    zolpidem (AMBIEN) 5 MG Tab Take 5 mg by mouth nightly as needed.      atorvastatin (LIPITOR) 40 MG tablet Take 40 mg by mouth once daily.      epoetin vikki (PROCRIT) 10,000 unit/mL injection Inject 1 mL (10,000 Units total) into the skin every Mon, Wed, Fri. To be given with HD       No current facility-administered medications for this visit.          Currently Mr White is responsible for preparing / administering this patient's medications on a daily basis.  I am available for consultation and can be contacted, as needed by the other members of the Kidney Transplant team.

## 2019-01-08 NOTE — TELEPHONE ENCOUNTER
Reviewed pt transplant labs.  Notified dialysis unit dietitian of the following abnormal labs via fax.   Alb 3.1   Chol 213  Phos 6.2

## 2019-01-08 NOTE — PROGRESS NOTES
INITIAL PATIENT EDUCATION NOTE    Mr. Isidro White Jr. was seen in pre-kidney transplant clinic for evaluation for kidney, kidney/pancreas or pancreas only transplant.  The patient attended a group education session that discussed/reviewed the following aspects of transplantation: evaluation and selection committee process, [facility]OS waitlist management/multiple listings, types of organs offered (KDPI < 85%, KDPI > 85%, PHS increased risk, DCD, HCV+), financial aspects, surgical procedures, dietary instruction pre- and post-transplant, health maintenance pre- and post-transplant, post-transplant hospitalization and outpatient follow-up, potential to participate in a research protocol, and medication management and side effects.  A question and answer session was provided after the presentation.    The patient was seen by all members of the multi-disciplinary team to include: Nephrologist/PA, Surgeon, , Transplant Coordinator, , Pharmacist and Dietician (if applicable).    The patient reviewed and signed all consents for evaluation which were witnessed and sent to scanning into the EPIC chart.    The patient was given an education book and plan for further evaluation based on his individual assessment.      The patient was encouraged to call with any questions or concerns.

## 2019-01-08 NOTE — PROGRESS NOTES
Pre Transplant Infectious Diseases Consult  Kidney Transplant Recipient Evaluation    Reason for Visit:    Chief Complaint   Patient presents with    Chronic Kidney Disease    Kidney Transplant Evaluation     History of Present Illness  Isidro White is a 48 y.o. year old Black or  male with advanced Kidney disease currently being evaluated for Kidney transplant. He has history of [facility] at age 8 [facility] HTN diagnosed in 2000, NICM, s/p AVR with a 22 mm mechanical valve and TV annuloplasty with a 28 mm ring on 2/8/17, SVT, EP performed RFA on 3/13/17. He has ESRD presumed secondary to HTN. He denies having a native kidney biopsy. Patient is currently on hemodialysis started on 2/3/17. Patient is dialyzing on MWF schedule.  Patient reports that he is tolerating dialysis well.. He has a LUE AV graft for dialysis access.     Patient denies any recent fever, chills, or infective illnesses.      1) Do you have a history of:   YES NO   Diabetes      [] [x]     Diabetic Foot Infection/Bone Infection  []        [x]     Surgical Removal of Spleen   []  [x]              2) Have you had recurrent infections involving:             YES NO  Sinus infections  [x]         []  Once   Sore Throat   []         [x]                 Prostate Infections  []         [x]              Bladder Infections  []         [x]                     Kidney Infections  []         [x]                               Intestinal Infections  []         [x]      Skin Infections   []         [x]       Reproductive Infections          []  [x]   Periodontal Disease  [x]         []        3)Have you ever had: YES     NO UNKNOWN      Chicken Pox   [x]         []  []   Shingles   []         [x]  []   Orolabial Herpes             []  [x]  []   Genital Herpes  []         [x]  []   Cytomegalovirus  []         [x]  []   Gely-Barr Virus  []         [x]  []   Genital Warts   []         [x]  []   Hepatitis A   []         [x]  []    Hepatitis B   [x]         []  []  Untreated. Denies seeing hepatology   Hepatitis C   []         [x]  []   Syphilis   []         [x]  []   Gonorrhea   []         [x]  []   Pelvic Inflammatory   Disease   []         [x]  []   Chlamydia Infection  []         [x]  []   Intestinal parasites   or worms   []         [x]  []   Fungal Infections  []         [x]  []   Blood Infections  []         [x]  []     Comment:       4) Have you ever been exposed   YES NO  To someone with tuberculosis?  []   [x]   If yes, what treatment did you receive:     5) What states have you lived in? [place] 1 year     6) What countries have you visited for more than 2 weeks? none                        YES NO  7) Did you have any associated infections?  []  [x]       8) Are you planning to travel outside the    []  [x]   [place] after your transplant?    9) Household                   YES NO  Do you have pets living in your house?    []         [x]   If yes, describe:     Do you spend time or live on a farm or     []         [x]   have livestock or other farm animals?  If yes, which ones:    Do you have a fish tank?          []  [x]       Do you have a litter box?      []         [x]     Do you fish or hunt?       []         [x]     Do you clean or skin fish or animals?    []         [x]     Do you consume raw or undercooked    []         [x]   meat, fish, or shellfish?      10) What occupations have you had?        12)Do you garden or otherwise  YES NO   work in the soil?    []         [x]   13)Do you hike, camp, or spend     time in wooded areas?   []         [x]         14) The patient's immunization history was reviewed.    Have you ever received:  YES NO UNKNOWN DATES   Routine Childhood vaccines  [x]         []  []      Influenza vaccine   [x]  []  []  9/2018   Pneumovax    [x]  []  [] 10/2018    Tetanus-diptheria   []         [x]  []    Hepatitis A vaccine series       []  [x]  []    Hepatitis B vaccine series         []   [x]  []    Meningitis vaccine   []         []  []    Varicella vaccine   []         []  []        Based on the patients immunization history and serologies, immunizations were ordered:         Ordered  Not Ordered  Influenza Vaccine     []    [x]   Hepatitis A series at 0,  6 months   []    [x]   Hepatitis B seriesat 0, 1, and 6 months  []    [x]   Hepatitis B High Dose 0,1, and 6 months  []    [x]   Prevnar      []    [x]   Pneumovax      []    [x]    TDap       []    [x]    Zoster       []    [x]   Menactra      []    [x]            The patient was encouraged to contact us about any problems that may develop after immunization and possible side effects were reviewed.      Previous Transplant: no    Etiology of Kidney Disease: HTN    Allergies: Patient has no known allergies.  Immunization History   Administered Date(s) Administered    PPD Test 02/13/2017     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     HD M, W, F    ESRD on dialysis     Hypertension     Secondary hyperparathyroidism of renal origin     Supraventricular tachycardia     atrial tachycardia    Tachycardia     Valvular regurgitation     AI, TR     Past Surgical History:   Procedure Laterality Date    ABLATION N/A 3/13/2017    [facility]    ASD REPAIR      age 8 [facility]    AV Graft Creation Left 03/2017    CARDIAC [facility]HETERIZATION      [facility]     CARDIAC VALVE SURGERY  02/08/2017    22 mm Medtronic AV, 28 mm TV Medtronic annuloplaty ring    [id number] Accuseal Left 3/16/2017    [facility] OR 2ND FLR    RADIOFREQUENCY ABLATION  03/13/2017    Atrial tachycardia    REDO STERNOTOMY WITH AORTIC VALVE REPLACEMENT/redo sternotomy N/A  2/8/2017    [facility] OR 2ND FLR    REPAIR-VALVE-TRICUSPID  2/8/2017    [facility] OR 2ND FLR    REPLACEMENT-VALVE-AORTIC N/A 2/8/2017    [facility] OR 2ND FLR      Social History     Socioeconomic History    Marital status: Single     Spouse name: Not on file    Number of children: Not on file    Years of education: Not on file    Highest education level: Not on file   Social Needs    Financial resource strain: Not on file    Food insecurity - worry: Not on file    Food insecurity - inability: Not on file    Transportation needs - medical: Not on file    Transportation needs - non-medical: Not on file   Occupational History    Not on file   Tobacco Use    Smoking status: Never Smoker    Smokeless tobacco: Never Used   Substance and Sexual Activity    Alcohol use: No     Frequency: Never     Comment: quit in 2016; does have heavy drinking history; started drinking at age 13; was drinking a 12 pack over the weekend and two 24 ounces of beer a day during the week along with a pint of hard alcohol    Drug use: No    Sexual activity: Not on file   Other Topics Concern    Not on file   Social History Narrative    Not on file       Review of Systems   Constitution: Positive for malaise/fatigue. Negative for chills, decreased appetite, fever, weakness, night sweats, weight gain and weight loss.   HENT: Negative for congestion, ear pain, hearing loss, hoarse voice, sore throat and tinnitus.    Eyes: Negative for blurred vision, pain, vision loss in left eye, vision loss in right eye and visual disturbance.   Cardiovascular: Negative for chest pain, dyspnea on exertion, leg swelling and palpitations.   Respiratory: Negative for cough, shortness of breath, sputum production and wheezing.    Skin: Negative for dry skin, itching, rash and suspicious lesions.   Musculoskeletal: Negative for back pain, joint  pain, myalgias and neck pain.   Gastrointestinal: Negative for abdominal pain, constipation, diarrhea, heartburn, nausea and vomiting.        +abdominal discomfort LLQ   Genitourinary: Negative for dysuria, flank pain, frequency, hematuria, hesitancy and urgency.   Neurological: Negative for dizziness, headaches, numbness and paresthesias.   Psychiatric/Behavioral: Negative for depression and memory loss. The patient does not have insomnia and is not nervous/anxious.    All other systems reviewed and are negative.    Physical Exam   Constitutional: He is oriented to person, place, and time. He appears well-developed and well-nourished. No distress.   HENT:   Head: Normocephalic and atraumatic.   Mouth/Throat: Oropharynx is clear and moist.   Eyes: EOM are normal. Pupils are equal, round, and reactive to light.   Neck: Normal range of motion. Neck supple.   Cardiovascular: Normal rate, regular rhythm, normal heart sounds and intact distal pulses. Exam reveals no gallop and no friction rub.   No murmur heard.  Pulmonary/Chest: Effort normal and breath sounds normal. No respiratory distress. He has no wheezes. He has no rales. He exhibits no tenderness.   Abdominal: Bowel sounds are normal. He exhibits no distension and no mass. There is no tenderness ( nontender to palpation of LLQ). There is no guarding.   Musculoskeletal: Normal range of motion. He exhibits no edema or deformity.   Neurological: He is alert and oriented to person, place, and time.   Skin: Skin is warm and dry. No rash noted. He is not diaphoretic. No erythema.   Pruritic bumps on back. No redness seen   Psychiatric: He has a normal mood and affect. His behavior is normal. Judgment and thought content normal.   Nursing note and vitals reviewed.    Diagnostics: No results found for: RPR  No results found for: CMVANTIBODIE  No results found for: HIV1X2  No results found for: HTLVIIIANTIB  Hepatitis B Surface Ag   Date Value Ref Range Status    02/13/2017 Positive (A)  Final     Hep B Core Total Ab   Date Value Ref Range Status   02/13/2017 Positive (A)  Final     Hepatitis C Ab   Date Value Ref Range Status   07/12/2016 Negative  Final     No results found for: TOXOIGG  No components found for: TOXOIGGINTER  No results found for: YMH3ATF  No results found for: SNY4MSJ  No results found for: VARICELLAZOS  No results found for: VARICELLAINT  No results found for: STRONGANTIGG  No results found for: EPSTEINBARRV  Hep B S Ab   Date Value Ref Range Status   02/13/2017 Negative  Final     No results found for: QUANTIFERON  Hep A IgM   Date Value Ref Range Status   02/13/2017 Negative  Final     No results found for: PPD  No results found for this or any previous visit.         Transplant Infectious Diseases - Candidacy    Assessment/Plan:     Transplant Candidacy: Based on available information, there are no identified significant barriers to transplantation from an infectious disease standpoint pending acceptable serologies.  Final determination of transplant candidacy will be made once evaluation is complete and reviewed by the Transplant Selection Committee.    Recommend pt seeing hepatology outpatient for f/u of hepatitis B   Pt f/u with PCP [id number]. If continues to have pruritic nodules,  recommend pt seeing dermatology for further evaluation    Quantiferon gold, HIV, Strongyloides, and RPR pending. If positive, please refer to ID clinic.    Vaccines recommended   1. Tdap. rx given       Christina Rivas PA-C         Counseling:I discussed with Isidro Lozano the risk for increased susceptibility to infections following transplantation including increased risk for infection right after transplant and if rejection should occur.  The patients has been counseled on the importance of vaccinations including but not limited to a yearly flu vaccine.  Specific guidance has been provided to the patient regarding the patients occupation, hobbies and activities to avoid  future infectious complications including but not limited to avoiding undercooked meats and seafood, proper hygiene, and contact with animals.

## 2019-01-08 NOTE — PROGRESS NOTES
Transplant Surgery  Kidney Transplant Recipient Evaluation    Referring Physician: Gus Hurst  Current Nephrologist: Gus Hurst    Subjective:     Reason for Visit: evaluate transplant candidacy    History of Present Illness: Isidro White is a 48 y.o. year old male undergoing transplant evaluation.    Dialysis History: Isidro Lozano is on hemodialysis.  approx 3 years of HD time currently  Transplant History: N/A    Etiology of Renal Disease: Hypertensive Nephrosclerosis (based on medical records from referral).    Review of Systems   Constitutional: Positive for activity change and fatigue. Negative for appetite change, chills and fever.   HENT: Negative for congestion, nosebleeds, sinus pressure, sore throat and voice change.    Eyes: Negative for pain and visual disturbance.   Respiratory: Negative for cough and shortness of breath.    Cardiovascular: Negative for palpitations and leg swelling.        Significant heart history and valve repair. No history of vascular surgery   Gastrointestinal: Negative for abdominal distention, abdominal pain, diarrhea, nausea and vomiting.   Endocrine: Negative for cold intolerance and heat intolerance.   Genitourinary: Negative for dysuria, flank pain, frequency and hematuria.   Musculoskeletal: Negative for back pain and gait problem.   Skin: Negative for color change, pallor and wound.   Allergic/Immunologic: Negative for food allergies.   Neurological: Negative for dizziness, seizures, numbness and headaches.   Hematological: Negative for adenopathy.   Psychiatric/Behavioral: Negative for agitation, behavioral problems, hallucinations and sleep disturbance.       Objective:     Physical Exam:  Constitutional:   Vitals reviewed: yes   Well-nourished and well-groomed: yes but moderate obesity  Eyes:   Sclerae icteric: no   Extraocular movements intact: yes  GI:    Bowel sounds normal: yes   Tenderness: no    If yes, quadrant/location: not applicable   Palpable  masses: no    If yes, quadrant/location: not applicable   Hepatosplenomegaly: no   Ascites: no   Hernia: no    If yes, type/location: not applicable   Surgical scars: no    If yes, type/location: sternotomy and chest tube scars, keloids  Resp:   Effort normal: yes   Breath sounds normal: yes    CV:   Regular rate and rhythm: yes   Heart sounds normal: yes   Femoral pulses normal: yes   Extremities edematous: no  Skin:   Rashes or lesions present: no    If yes, describe:not applicable   Jaundice:: no    Musculoskeletal:   Gait normal: yes   Strength normal: yes  Psych:   Oriented to person, place, and time: yes   Affect and mood normal: yes    Additional comments: not applicable    Counseling: We provided Isidro White Jr. with a group education session today.  We discussed kidney transplantation at length with him, including risks, potential complications, and alternatives in the management of his renal failure.  The discussion included complications related to anesthesia, bleeding, infection, primary nonfunction, and ATN.  I discussed the typical postoperative course, length of hospitalization, the need for long-term immunosuppression, and the need for long-term routine follow-up.  I discussed living-donor and -donor transplantation and the relative advantages and disadvantages of each.  I also discussed average waiting times for both living donation and  donation.  I discussed national and center-specific survival rates.  I also mentioned the potential benefit of multicenter listing to candidates listed with centers within more than one organ procurement organization.  All questions were answered.    Final determination of transplant candidacy will be made once evaluation is complete and reviewed by the Kidney & Kidney/Pancreas Selection Committee.         Transplant Surgery - Candidacy   Assessment/Plan:   Isidro White Jr. has end stage renal disease (ESRD) on dialysis. I have concerns  with his antiocoagulation. Based on available information, Isidro White . is a high-risk kidney transplant candidate.   High risk due to cardiac disease and anticoagulation.  Seems overall suitable for transplant.     Christopher Miller Jr, MD

## 2019-01-08 NOTE — LETTER
January 8, 2019        Gus Hurst  [address] [place] [address]  Phone: [phone]  Fax: [phone]             [facility]  [address]  [facility] [address]  Phone: [phone]   Patient: Isidro White Jr.   MR Number: [id number]   YOB: 1970   Date of Visit: 1/8/2019       Dear Dr. Gus Hurst    Thank you for referring Isidro White to me for evaluation. Attached you will find relevant portions of my assessment and plan of care.    If you have questions, please do not hesitate to call me. I look forward to following Isidro White along with you.    Sincerely,    Shannon Lassiter MD    Enclosure    If you would like to receive this communication electronically, please contact [email or web address] or [phone] to request [employer] access.    [employer] is a tool which provides read-only access to select patient information with whom you have a relationship. Its easy to use and provides real time access to review your patients record including encounter summaries, notes, results, and demographic information.    If you feel you have received this communication in error or would no longer like to receive these types of communications, please e-mail [email or web address]

## 2019-01-08 NOTE — PROGRESS NOTES
Transplant Recipient Adult Psychosocial Assessment    Isidro White  [address]  [facility] [address]  Telephone Information:   Mobile [phone]   Home  [phone] (home)  Work  There is no work phone number on file.  E-mail  No e-mail address on record    Sex: male  YOB: 1970  Age: 48 y.o.    Encounter Date: 2019  [place] Citizen: yes  Primary Language: English   Needed: no   Transportation:   Pt reports he drives and has own truck    Emergency Contact:  Marizol White, 70 yo aunt, Kumar THOMPSON, does drive/own car, works full time as [employer] clinic nurse in . [phone]  Maggie Hernandez, sister, Estefani THOMPSON, [phone]    Family/Social Support:   Number of dependents/: 8 yo Mercedes Mg,13 yo Mely, 13 yo Ty, 17 yo twins Cecil and Saturnino. All will be cared for by their mothers for transplant.  Marital history: single, never   Other family dynamics: Pt reports both parents are . Pt reports lives with his brother Maykel White in [facility]. Pt reports having 8 children with 3 being adult children. Pt reports adult children may be able to assist with transplant if needed. Aunt Marizol White was with patient today for organ transplant appointments and reports will be patient's primary transplant caregiver.    Household Composition:  Maykel White, 39 yo brother, Kumar THOMPSON, does drive/own car, works as . (patient does not have phone number)    Do you and your caregivers have access to reliable transportation? yes  PRIMARY CAREGIVER: aunt Blanchard, will be primary caregiver, phone number [phone].     provided in-depth information to patient and caregiver regarding pre- and post-transplant caregiver role.   strongly encourages patient and caregiver to have concrete plan regarding post-transplant care giving, including back-up caregiver(s) to ensure care giving needs are met as needed.    Patient and Caregiver  states understanding all aspects of caregiver role/commitment and is able/willing/committed to being caregiver to the fullest extent necessary.    Patient and Caregiver verbalizes understanding of the education provided today and caregiver responsibilities.         remains available. Patient and Caregiver agree to contact  in a timely manner if concerns arise.      Able to take time off work without financial concerns: yes.     Additional Significant Others who will Assist with Transplant:   Pt does not have clear back up transplant back up caregiver plan at this time. Pt reports plan to ask 2 sisters Maggie and Iza as well as 3 grown children Tricia and Isidro III ([facility]), Hellen Winters ([facility]).     Pt's children:   6 yo Mercedes Mg  13 yo Mely  15 yo Ty  15 yo twins Cecil and Saturnino  19 yo Isidro III ([facility]), [facility] ([facility]).   23 yo  Daniesha  29 yo [facility]         Living Will: no  Healthcare Power of : yes Maggie Hernandez, sister,   Advance Directives on file: <<no information> per medical record.  Verbally reviewed LW/HCPA information.   provided patient with copy of LW/HCPA documents and provided education on completion of forms.    Living Donors: No.    Highest Education Level: High School (9-12) or GED  Reading Ability: 10th grade  Reports difficulty with: N/A  Learns Best By:  Reading about, seeing and doing new task until proficient     Status: no  VA Benefits: no     Working for Income: No  If no, reason not working: Disability  Patient reports last job held was as  for 20+ years until becoming ill with heart problems and now ESRD.    Spouse/Significant Other Employment: Pt reports is not .    Disabled: yes due to heart problems and ESRD    Monthly Income:  Disability $960  Able to afford all costs now and if transplanted, including medications: yes  Patient and Caregiver verbalizes  understanding of personal responsibilities related to transplant costs and the importance of having a financial plan to ensure that patients transplant costs are fully covered.       provided fundraising information/education. Patient and Caregiververbalizes understanding.   remains available.    Insurance:   Payor/Plan Subscr  Sex Relation Sub. Ins. ID Effective Group Num   1. [employer]* ARDHA PONCE RA* 1970 Male Self [id number] 8/15/18                                    [address] [address]   2. MEDICAID - * RADHA PONCE RA* 1970 Male  [id number] [address] [facility]                                   [address] [address]     Primary Insurance (for UNOS reporting): Public Insurance - Medicaid  Secondary Insurance (for UNOS reporting): None Pt reports has applied for Medicare which is supposed to be starting 2019. Pt to provide to [facility] transplant new Medicare card once received.  Patient and Caregiver verbalizes clear understanding that patient may experience difficulty obtaining and/or be denied insurance coverage post-surgery. This includes and is not limited to disability insurance, life insurance, health insurance, burial insurance, long term care insurance, and other insurances.      Patient and Caregiver also reports understanding that future health concerns related to or unrelated to transplantation may not be covered by patient's insurance.  Resources and information provided and reviewed.     Patient and Caregiver provides verbal permission to release any necessary information to outside resources for patient care and discharge planning.  Resources and information provided are reviewed.      Domingo THOMPSON, TEL: [phone] FAX: [phone]. Hemodialysis: MWF 4.5 hours.    Dialysis Adherence: Patient and Caregiver reports high compliance with all dialysis treatments and instructions.  Dialysis compliance check form sent to unit for completion.    Infusion  Service: patient utilizing? no  Home Health: patient utilizing? no  DME: pt denies   Pulmonary/Cardiac Rehab: pt denies  ADLS:  Pt reports is independent with driving and self care    Adherence:   Pt reports is highly compliant with all medical appointments and instructions. Adherence education and counseling provided.     Per History Section:  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     HD M, W, F    ESRD on dialysis     Hypertension     Secondary hyperparathyroidism of renal origin     Supraventricular tachycardia     atrial tachycardia    Tachycardia     Valvular regurgitation     AI, TR     Social History     Tobacco Use    Smoking status: Never Smoker    Smokeless tobacco: Never Used   Substance Use Topics    Alcohol use: No     Frequency: Never     Comment: quit in 2016; does have heavy drinking history; started drinking at age 13; was drinking a 12 pack over the weekend and two 24 ounces of beer a day during the week along with a pint of hard alcohol     Social History     Substance and Sexual Activity   Drug Use No     Social History     Substance and Sexual Activity   Sexual Activity Not on file       Per Today's Psychosocial:  Tobacco: none, patient denies any use.  Alcohol: pt reports to transplant nephrologist heavy drinker -- please see above alcohol use prior to 2016. Reports to transplant  quit drinking 2016 due to health concerns. Pt reports has 1 DWI in [place] where he was jailed for 2 months and let go. Pt denies participating in Alcohol rehab program or self help program AA. Pt reports not drinking alcohol and reports plan to abstain from all alcohol use.  Illicit Drugs/Non-prescribed Medications: none, patient denies any use.    Patient and Caregiver states clear  understanding of the potential impact of substance use as it relates to transplant candidacy and is aware of possible random substance screening.  Substance abstinence/cessation counseling, education and resources provided and reviewed.     Arrests/DWI/Treatment/Rehab: yes DWI 10 years ago in [place] and jailed for 2 months and let go. No AA or alcohol rehab stay.    Psychiatric History:    Mental Health: pt denies any overwhelming feelings of depression or anxiety and denies any mental health history. Pt denies need for mental health referral at this time.  Psychiatrist/Counselor: pt denies  Medications:  Pt denies  Suicide/Homicide Issues: pt denies si/hi history  Safety at home: pt reports living in safe home environment    Knowledge: Patient and Caregiver states having clear understanding and realistic expectations regarding the potential risks and potential benefits of organ transplantation and organ donation and agrees to discuss with health care team members and support system members, as well as to utilize available resources and express questions and/or concerns in order to further facilitate the pt informed decision-making.  Resources and information provided and reviewed.    Patient and Caregiver is aware of [facility]'s affiliation and/or partnership with agencies in home health care, LTAC, SNF, [facility], and other hospitals and clinics.    Understanding: Patient and Caregiver reports having a clear understanding of the many lifetime commitments involved with being a transplant recipient, including costs, compliance, medications, lab work, procedures, appointments, concrete and financial planning, preparedness, timely and appropriate communication of concerns, abstinence (ETOH, tobacco, illicit non-prescribed drugs), adherence to all health care team recommendations, support system and caregiver involvement, appropriate and timely resource utilization and follow-through, mental health counseling as  needed/recommended, and patient and caregiver responsibilities.  Social Service Handbook, resources and detailed educational information provided and reviewed.  Educational information provided.    Patient and Caregiver also reports current and expected compliance with health care regime and states having a clear understanding of the importance of compliance.      Patient and Caregiver reports a clear understanding that risks and benefits may be involved with organ transplantation and with organ donation.       Patient and Caregiver also reports clear understanding that psychosocial risk factors may affect patient, and include but are not limited to feelings of depression, generalized anxiety, anxiety regarding dependence on others, post traumatic stress disorder, feelings of guilt and other emotional and/or mental concerns, and/or exacerbation of existing mental health concerns.  Detailed resources provided and discussed.      Patient and Caregiver agrees to access appropriate resources in a timely manner as needed and/or as recommended, and to communicate concerns appropriately.  Patient and Caregiver also reports a clear understanding of treatment options available.     Patient and Caregiver received education in a group setting.   reviewed education, provided additional information, and answered questions.    Feelings or Concerns: Pt reports high motivation to pursue organ transplant    Coping: Pt reports is coping well with ESRD and need for organ transplant and reports shade best through bishnu and prayer. Pt reports enjoys visiting with his children and watching football.  Pt reports walks for exercise.    Goals: Pt reports plan to return to work if medically able post transplant. Patient referred to Vocational Rehabilitation.    Interview Behavior: Patient and Caregiver presents as alert and oriented x 4, pleasant, good eye contact, well groomed, recall good, concentration/judgement good, average  intelligence, calm, communicative, cooperative and asking and answering questions appropriately. Pt's aunt Marizol with patient for organ transplant appointments and was highly engaged throughout the session.         Transplant Social Work - Candidacy  Assessment/Plan:     Psychosocial Suitability: Patient presents as an acceptable candidate for kidney transplant at this time pending dialysis compliance update. Based on psychosocial risk factors, patient presents as medium risk, due to pt reporting heavy alcohol use prior to 2016 with no alcohol rehab or AA participation. Pt reports is no longer drinking alcohol and reports plan to abstain from all alcohol use for his lifetime. Pt reports has applied for Medicare which will begin June 2019. Pt reports having transplant caregiver/transportation plan and will develop back up caregiver plan soon. Dialysis compliance update sent to unit for completion.    Recommendations/Additional Comments: Pt reports lives in [facility] and  he and caregiver will be able to stay in transplant housing if needed post transplant. Pt reports has applied for Medicare and it should be available June 2019; will provide Medicare card once available. Pt reports caregivers work and may need employer paperwork completed for any time missed due to transplant.    Elvia Jara MSW LCSW

## 2019-01-08 NOTE — PATIENT INSTRUCTIONS
1. Stay active   2. Talk to your kidney doctor or the heart doctor about the cough and if they think it could be the lisinopril medication you are on   3. Have any potential donors contact the living donor coordinator   4. If you have worsened abdominal pain go to the ER

## 2019-01-08 NOTE — PROGRESS NOTES
Transplant Nephrology  Kidney Transplant Recipient Evaluation    Referring Physician: Gus Hurst  Current Nephrologist: Gus Hurst    Subjective:   CC:  Initial evaluation of kidney transplant candidacy.    HPI:  Mr. White is a 48 y.o. year old Black or  male who has presented to be evaluated as a potential kidney transplant recipient.  He has history of ASD closure at age 8 ([facility]), HTN diagnosed in 2000, NICM, s/p AVR with a 22 mm mechanical valve and TV annuloplasty with a 28 mm ring on 2/8/17, SVT, EP performed RFA on 3/13/17. He has ESRD presumed secondary to HTN. He denies having a native kidney biopsy. Patient is currently on hemodialysis started on 2/3/17. Patient is dialyzing on MWF schedule.  Patient reports that he is tolerating dialysis well.. He has a LUE AV graft for dialysis access. He dialyzes for 4 hours and 30 minutes and will have hypotension occasionally to the 80s systolic - he can't remember when the last episode was. He reports his dry weight is 95.7 kg. Usually gains 2-5 kgs in between sessions. Denies any issues with AVG thrombosis or prolonged bleeding after needle removal. He urinates maybe just once a day currently. His last blood transfusion was in [place], [place] when he was losing blood/having recurrent epistaxis shortly after the Feb and March 2017 procedures.     He has untreated Hepatitis B. He thinks it may have been contracted when he [place] age 18 or 19. He denies seeing a hepatologist yet but states Dr. Hurst referred him.     He has history of hematuria a couple of weeks ago and states Dr. Hurst referred him to urology but he hasn't seen them yet. Of note he is on warfarin for mechanical AVR.     History of heavy alcohol use --> quit in 2016; started drinking at age 13; was drinking a 12 pack over the weekend and two 24 ounces of beer a day during the week along with a pint of hard  alcohol    He denies history of DM, cancer, or CVA    He is accompanied to visit by paternal aunt.     Previous Transplant: no    Functional Status: He will walk on the treadmill for 40 minutes 5 times a week. He currently lives with brother in a 1 story house. No stairs. No pets. He will clean his bathroom, do laundry, wash his truck, cook his own food, and go grocery shopping. With the activity that he does he denies symptoms of chest pain, SOB, claudication.     Past Medical History:  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     HD M, W, F    ESRD on dialysis     Hypertension     Secondary hyperparathyroidism of renal origin     Supraventricular tachycardia     atrial tachycardia    Tachycardia     Valvular regurgitation     AI, TR       Past Surgical History:   Past Surgical History:   Procedure Laterality Date    ABLATION N/A 3/13/2017    Performed [facility]el [facility] at [facility] CATH LAB    ASD REPAIR      age 8 [facility]    AV Graft Creation Left 03/2017    CARDIAC [facility]HETERIZATION      [facility]     CARDIAC VALVE SURGERY  02/08/2017    22 mm Medtronic AV, 28 mm TV Medtronic annuloplaty ring    [id number] Accuseal Left 3/16/2017    Performed by Alcides Manning MD at [facility] OR 2ND FLR    RADIOFREQUENCY ABLATION  03/13/2017    Atrial tachycardia    REDO STERNOTOMY WITH AORTIC VALVE REPLACEMENT/redo sternotomy N/A 2/8/2017    Performed by Jose [facility]astelum [facility] at [facility] OR 2ND FLR    REPAIR-VALVE-TRICUSPID  2/8/2017    Performed [facility] at [facility] OR 2ND FLR    REPLACEMENT-VALVE-AORTIC N/A 2/8/2017    Performed by Jose [facility]um [facility] at [facility] OR 2ND FLR       Medications:   Current Outpatient Medications   Medication Sig Dispense Refill     amLODIPine (NORVASC) 10 MG tablet Take 1 tablet (10 mg total) by mouth once daily. 30 tablet 8    ascorbic acid, vitamin C, (VITAMIN C) 500 MG tablet Take 1 tablet (500 mg total) by mouth 2 (two) times daily.      calcium acetate (PHOSLO) 667 mg capsule Take 1 capsule (667 mg total) by mouth 3 (three) times daily. 90 capsule 11    diltiaZEM (CARDIZEM CD) 240 MG 24 hr capsule Take 1 capsule (240 mg total) by mouth once daily. 90 capsule 3    furosemide (LASIX) 80 MG tablet TAKE ONE TABLET BY MOUTH EVERY DAY 30 tablet 9    hydrOXYzine HCl (ATARAX) 25 MG tablet Take 1 tablet (25 mg total) by mouth 3 (three) times daily as needed for Itching. 60 tablet 0    labetalol (NORMODYNE) 100 MG tablet Take 100 mg by mouth once daily. If heart rate greater than 120      lisinopril (PRINIVIL,ZESTRIL) 40 MG tablet TAKE ONE TABLET BY MOUTH EVERY DAY 30 tablet 9    metoprolol succinate (TOPROL-XL) 200 MG 24 hr tablet TAKE ONE TABLET BY MOUTH EVERY DAY 90 tablet 2    omega-3 fatty acids-vitamin E (FISH OIL) 1,000 mg Cap Take 1 capsule by mouth once daily.  0    pantoprazole (PROTONIX) 40 MG tablet TAKE ONE TABLET BY MOUTH EVERY DAY 30 tablet 9    ETHAN-RACQUEL RX 1- mg-mg-mcg Tab TAKE ONE TABLET BY MOUTH EVERY DAY 90 tablet 2    warfarin (COUMADIN) 7.5 MG tablet Take 1 tablet (7.5 mg total) by mouth Daily. 30 tablet 11    zolpidem (AMBIEN) 5 MG Tab Take 5 mg by mouth nightly as needed.      atorvastatin (LIPITOR) 40 MG tablet Take 40 mg by mouth once daily.      epoetin vikki (PROCRIT) 10,000 unit/mL injection Inject 1 mL (10,000 Units total) into the skin every Mon, Wed, Fri. To be given with HD       No current facility-administered medications for this visit.        Social History:  Social History     Socioeconomic History    Marital status: Single     Spouse name: Not on file    Number of children: Not on file    Years of education: Not on file    Highest education level: Not on file   Social Needs     Financial resource strain: Not on file    Food insecurity - worry: Not on file    Food insecurity - inability: Not on file    Transportation needs - medical: Not on file    Transportation needs - non-medical: Not on file   Occupational History    Not on file   Tobacco Use    Smoking status: Never Smoker    Smokeless tobacco: Never Used   Substance and Sexual Activity    Alcohol use: No     Frequency: Never     Comment: quit in 2016; does have heavy drinking history; started drinking at age 13; was drinking a 12 pack over the weekend and two 24 ounces of beer a day during the week along with a pint of hard alcohol    Drug use: No    Sexual activity: Not on file   Other Topics Concern    Not on file   Social History Narrative    Not on file       Family History:   Family History   Problem Relation Age of Onset    Leukemia Mother     Heart attack Father         massive MI at age 67    No Known Problems Sister     No Known Problems Brother     No Known Problems Sister     No Known Problems Sister     Heart failure Paternal Grandmother     Diabetes Paternal Aunt     Hypertension Paternal Aunt     Leukemia Paternal Aunt         CLL    Suicide Paternal Uncle     Anesthesia problems Paternal Uncle     Diabetes Paternal Aunt     Stroke Paternal Aunt     Valvular heart disease Maternal Aunt     Kidney disease Neg Hx     Colon cancer Neg Hx        Review of Systems  Constitutional: +fatigue; Denies fever/chills, night sweats  EENT: Denies vision problems, hearing problems, trouble swallowing.   Respiratory: +intermitent cough; Denies shortness of breath, dyspnea on exertion, orthopnea, wheezing, hemoptysis, denies known TB exposure or history of positive TB skin test  Cardiovascular: +occasional palpitations; Denies chest pain, history of MI, history of stroke, history of DVT  Gastrointestinal: Denies abdominal pain, nausea/vomiting/diarrhea/constipation. Denies history of GI bleeding or ulcers.  "  Genitourinary: +history of hematuria and was supposed to see urology but hasn't gone yet; Denies history of kidney stones, recurrent UTI's, history of urinary obstruction, dysuria, urinary frequency, incontinence  Musculoskeletal: Denies trouble moving extremities, denies joint pain or swelling  Skin: +dry skin, +pruritis; Denies history of skin cancer, denies ulcerations  Heme/onc: +bruises easily - on warfarin for AVR; Denies any history of cancer  Endocrine: Denies thyroid disease, unintentional weight loss/weight gain  Neurological: Denies tremors, seizures, dizziness, headaches, peripheral neuropathy  Psychiatric: Denies anxiety, depression. Denies hallucinations or delusions.    Potential Donors: states a local lady who works at the food bank offered to donate a kidney after she found out he is on dialysis      Objective:   Blood pressure (!) 157/90, pulse 84, temperature 98.1 °F (36.7 °C), temperature source Oral, resp. rate 16, height 6' 0.44" (1.84 m), weight 99.5 kg (219 lb 5.7 oz), SpO2 97 %.body mass index is 29.39 kg/m².    Physical Exam  General: No acute distress, well groomed, alert and oriented x 3  HEENT: Normocephalic, atraumatic, PERRLA, sclera anicteric, conjunctiva/corneas clear, EOM's intact bilaterally, external inspection of ears and nose normal, moist mucous membranes, no oral ulcerations/lesions   Neck: Supple, symmetrical, trachea midline, no masses, no carotid bruits, no JVD, thyroid is normal without nodules or enlargement   Respiratory: Clear to auscultation bilaterally, respirations unlabored, no rales/rhonchi/wheezing   Cardiovacular: +murmur; mechanical valve click  Gastrointestinal: Soft, tender to palpation along LLQ (patient rated it 5/10 in intensity), no rebound, bowel sounds normal, no masses, no palpable organomegaly  Extremities: No clubbing or cyanosis of upper extremities bilaterally, no pedal edema bilaterally; +2 bilateral radial pulses  Skin: warm and dry; no rash on " exposed skin  Lymph nodes: Cervical and supraclavicular nodes normal   Neurologic: No focal neurologic deficits.   Musculoskeletal: moves all extremities without difficulty, FROM, 5/5 strength, ambulates without an assistive device  Psychiatric: Normal mood and affect. Responds appropriately to questions.  Access: LUE AVG +thrill/bruit     Labs:  Lab Results   Component Value Date    WBC 5.36 01/08/2019    HGB 10.4 (L) 01/08/2019    HCT 30.8 (L) 01/08/2019     01/08/2019    K 4.4 01/08/2019     01/08/2019    CO2 28 01/08/2019    BUN 73 (H) 01/08/2019    CREATININE 13.1 (H) 01/08/2019    EGFRNONAA 4.0 (A) 01/08/2019    CALCIUM 9.3 01/08/2019    PHOS 6.2 (H) 01/08/2019    MG 2.1 05/15/2018    ALBUMIN 3.1 (L) 01/08/2019    AST 59 (H) 01/08/2019     (H) 01/08/2019    UTPCR 1.22 (H) 07/13/2016    UTPCR 1.22 (H) 07/13/2016    .0 (H) 01/08/2019       Lab Results   Component Value Date    PREALBUMIN 13 (L) 03/09/2017    BILIRUBINUA Negative 03/14/2017    PROTEINUA 2+ (A) 03/14/2017    NITRITE Negative 03/14/2017    RBCUA 2 03/14/2017    WBCUA 3 03/14/2017       No results found for: HLAABCTYPE    Labs were reviewed with the patient.    Assessment:     1. ESRD on dialysis    2. Adult congenital heart disease    3. Anemia of chronic disease    4. Atrial tachycardia    5. Chronic combined systolic and diastolic heart failure    6. Chronic pulmonary heart disease    7. Essential hypertension    8. Pulmonary HTN    9. S/P AVR (aortic valve replacement)    10. Pre-transplant evaluation for CKD (chronic kidney disease)    11. Nonischemic cardiomyopathy    12. Hypertensive cardiomegaly with heart failure    13. History of radiofrequency ablation for complex right atrial arrhythmia    14. Secondary hyperparathyroidism of renal origin        Plan:     Transplant Candidacy:   After obtaining history and performing physical exam as well as reviewing available diagnostic studies, Mr. White is a high-risk  kidney transplant candidate secondary to significant cardiac history as stated above as well as being on chronic anti-coagulation for mechanical aortic valve.  Final determination of transplant candidacy will be made once workup is complete and reviewed by the selection committee.    Prior to Listing, will need the following items to be completed:  1. Cardiac stress test and TTE along with cardiology clearance   2. Standard serologies and blood work  3. Abdominal US   4. Hepatology consultation and clearance in light of untreated Hepatitis B  5. Urology referral for hematuria     Discussed with SW (Elvia Jara) regarding patient's history of heavy alcohol use and she will explore further and determine if he needs any additional testing/work-up.    Patient with LLQ abdominal pain on exam for this writer but no pain really noted by surgeon who saw patient before this writer or ID provider who saw the patient after this writer - unclear etiology - advised patient to talk to his PCP regarding abdominal pain or to go to the ER if pain worsens    Meets center eligibility for accepting HCV+ donor offer - he would be depending on hepatology evaluation given he has Hep B and unknown what degree of fibrosis he currently has.  Patient educated on HCV+ donors. Isidro Lozano Cindy Trinidad. is willing to accept HCV+ donor offer - yes.  Patient is a candidate for KDPI > 85 kidney donor offer - no.    Exercise: reminded Isidro Lozano of the importance of regular exercise for weight management, blood sugar and blood pressure management.  I also explained exercise has been shown to improve cardiovascular health, energy level, and sleep hygiene.  Lastly, I advised him that cardiovascular complications are leading cause of death and regular exercise can help lower this risk.    Encouraged him to seek living donors and to have them contact the living donor coordinator     Shannon Lassiter MD       [facility] Patient Status  Functional Status: 70% -  Cares for self: unable to carry on normal activity or active work  Physical Capacity: No Limitations

## 2019-01-09 ENCOUNTER — TELEPHONE (OUTPATIENT)
Dept: NEPHROLOGY | Facility: CLINIC | Age: 49
End: 2019-01-09

## 2019-01-09 DIAGNOSIS — N02.0 IDIOPATHIC HEMATURIA WITH MINOR GLOMERULAR ABNORMALITY: Primary | ICD-10-CM

## 2019-01-09 DIAGNOSIS — R31.9 HEMATURIA SYNDROME: ICD-10-CM

## 2019-01-09 NOTE — PROGRESS NOTES
Patient scheduled for CT scan on 1/24/19 @ 9:00am. Patient aware. Unable to schedule with Dr. Bass until 2020. Sent request to Urology.

## 2019-01-14 ENCOUNTER — SOCIAL WORK (OUTPATIENT)
Dept: TRANSPLANT | Facility: CLINIC | Age: 49
End: 2019-01-14

## 2019-01-14 DIAGNOSIS — Z76.82 ORGAN TRANSPLANT CANDIDATE: Primary | ICD-10-CM

## 2019-01-14 NOTE — PROGRESS NOTES
Dialysis compliance update.    [facility] [phone] FAX: [phone]. Hemodialysis: MWF 4.5 hours. 1-8-19 Completed dialysis compliance form shows patient having high dialysis compliance of 0 no shows, 0 missed appointments and 0 psychosocial issues. Pt relies on [place] Medicaid transportation for dialysis appointments with no issues. Please see below for more dialysis information.    Psychosocial Suitability: Patient presents as an acceptable candidate for kidney transplant at this time. Based on psychosocial risk factors, patient presents as medium risk, due to pt reporting heavy alcohol use prior to 2016 with no alcohol rehab or AA participation. Pt reports is no longer drinking alcohol and reports plan to abstain from all alcohol use for his lifetime. Pt reports has applied for Medicare which will begin June 2019. Pt reports having transplant caregiver/transportation plan and will develop back up caregiver plan soon. Dialysis compliance update shows excellent dialysis compliance.     Recommendations/Additional Comments: Pt reports lives in [facility] and he and caregiver will be able to stay in transplant housing if needed post transplant. Pt reports has applied for Medicare and it should be available June 2019; will provide Medicare card once available. Pt reports caregivers work and may need employer paperwork completed for any time missed due to transplant.     Elvia Jara MSW LCSW

## 2019-01-17 ENCOUNTER — CLINICAL SUPPORT (OUTPATIENT)
Dept: CARDIOLOGY | Facility: CLINIC | Age: 49
End: 2019-01-17
Attending: INTERNAL MEDICINE
Payer: MEDICAID

## 2019-01-17 DIAGNOSIS — I10 ESSENTIAL HYPERTENSION: ICD-10-CM

## 2019-01-17 DIAGNOSIS — Z76.82 ORGAN TRANSPLANT CANDIDATE: Primary | ICD-10-CM

## 2019-01-17 DIAGNOSIS — I49.3 PVC'S (PREMATURE VENTRICULAR CONTRACTIONS): ICD-10-CM

## 2019-01-17 DIAGNOSIS — Z91.89 AT RISK FOR CORONARY ARTERY DISEASE: ICD-10-CM

## 2019-01-17 DIAGNOSIS — Z76.82 AWAITING ORGAN TRANSPLANT STATUS: Primary | ICD-10-CM

## 2019-01-17 DIAGNOSIS — N18.6 ESRD (END STAGE RENAL DISEASE): ICD-10-CM

## 2019-01-17 DIAGNOSIS — N18.6 END STAGE RENAL DISEASE: Primary | ICD-10-CM

## 2019-01-17 LAB
DIASTOLIC DYSFUNCTION: YES
ESTIMATED PA SYSTOLIC PRESSURE: 54.19
MITRAL VALVE REGURGITATION: ABNORMAL
RETIRED EF AND QEF - SEE NOTES: 60 (ref 55–65)
TRICUSPID VALVE REGURGITATION: ABNORMAL

## 2019-01-17 PROCEDURE — 93306 2D ECHO WITH COLOR FLOW DOPPLER: ICD-10-PCS | Mod: 26,S$PBB,NTX, | Performed by: INTERNAL MEDICINE

## 2019-01-17 PROCEDURE — 93306 TTE W/DOPPLER COMPLETE: CPT | Mod: PBBFAC,NTX | Performed by: INTERNAL MEDICINE

## 2019-01-18 ENCOUNTER — TELEPHONE (OUTPATIENT)
Dept: CARDIOLOGY | Facility: CLINIC | Age: 49
End: 2019-01-18

## 2019-01-18 NOTE — TELEPHONE ENCOUNTER
Spoke with pt and pt stated that on yesterday he did not receive HD since he only goes on Mon, Wed, and Friday. Pt states that is why he had extra fluid on him. Also that he is taking his medication as instructed and will continue medication and HD.

## 2019-01-18 NOTE — TELEPHONE ENCOUNTER
----- Message from Concepción Parker sent at 1/18/2019 11:32 AM CST -----  Contact: [phone]  Pt would like to consult with the nurse, personal.  Please call back at [phone].  Thx-

## 2019-01-22 ENCOUNTER — CLINICAL SUPPORT (OUTPATIENT)
Dept: CARDIOLOGY | Facility: CLINIC | Age: 49
End: 2019-01-22
Attending: INTERNAL MEDICINE
Payer: COMMERCIAL

## 2019-01-22 PROCEDURE — 93226 XTRNL ECG REC<48 HR SCAN A/R: CPT | Mod: PBBFAC,NTX | Performed by: INTERNAL MEDICINE

## 2019-01-22 PROCEDURE — 93227 XTRNL ECG REC<48 HR R&I: CPT | Mod: S$PBB,NTX,, | Performed by: INTERNAL MEDICINE

## 2019-01-22 PROCEDURE — 93227 HOLTER MONITOR - 48 HOUR: ICD-10-PCS | Mod: S$PBB,NTX,, | Performed by: INTERNAL MEDICINE

## 2019-01-24 ENCOUNTER — HOSPITAL ENCOUNTER (OUTPATIENT)
Dept: RADIOLOGY | Facility: HOSPITAL | Age: 49
Discharge: HOME | End: 2019-01-24
Attending: INTERNAL MEDICINE
Payer: COMMERCIAL

## 2019-01-24 DIAGNOSIS — N02.0 IDIOPATHIC HEMATURIA WITH MINOR GLOMERULAR ABNORMALITY: ICD-10-CM

## 2019-01-24 PROCEDURE — 74178 CT ABD&PLV WO CNTR FLWD CNTR: CPT | Mod: TC,TXP

## 2019-01-24 PROCEDURE — 25500020 PHARM REV CODE 255: Mod: NTX | Performed by: INTERNAL MEDICINE

## 2019-01-24 RX ADMIN — IOHEXOL 100 ML: 350 INJECTION, SOLUTION INTRAVENOUS at 11:01

## 2019-01-28 DIAGNOSIS — I10 ESSENTIAL HYPERTENSION: ICD-10-CM

## 2019-01-28 DIAGNOSIS — I49.3 PVC'S (PREMATURE VENTRICULAR CONTRACTIONS): Primary | ICD-10-CM

## 2019-01-28 RX ORDER — DILTIAZEM HYDROCHLORIDE 360 MG/1
360 CAPSULE, EXTENDED RELEASE ORAL DAILY
Qty: 90 CAPSULE | Refills: 1 | Status: SHIPPED | OUTPATIENT
Start: 2019-01-28 | End: 2019-01-31 | Stop reason: SDUPTHER

## 2019-01-29 RX ORDER — ZOLPIDEM TARTRATE 5 MG/1
TABLET ORAL
Qty: 30 TABLET | Refills: 4 | Status: SHIPPED | OUTPATIENT
Start: 2019-01-29 | End: 2020-02-18

## 2019-01-30 ENCOUNTER — DOCUMENTATION ONLY (OUTPATIENT)
Dept: NEPHROLOGY | Facility: CLINIC | Age: 49
End: 2019-01-30

## 2019-01-30 NOTE — PROGRESS NOTES
History & Physical      Chief Complaint:  H&P    HPI:        Patient is a 49 year old  male who is a hemodialysis patient at the [facility] HD unit. His current dialysis prescription is a follows: Dialyzer: OP 180NR. Dialysate: Ca 2.5, K 2.0. Duration of treatment is 270 min 3 times a week on MWF.  mL/min.  mL/min. Current hemodialysis access is a left upper arm graft, has positive thrill, positive bruit. EDW 96.5 kg.           ROS:        Constitutional: Negative for fever, chills, weight loss, malaise/fatigue and diaphoresis.   HENT: Negative for hearing loss, ear pain, nosebleeds, congestion, sore throat, neck pain, tinnitus and ear discharge.    Eyes: Negative for blurred vision, double vision, photophobia, pain, discharge and redness.   Respiratory: Negative for cough, hemoptysis, sputum production, shortness of breath, wheezing and stridor.    Cardiovascular: Negative for chest pain, palpitations, orthopnea, claudication, leg swelling and PND.   Gastrointestinal: Negative for heartburn, nausea, vomiting, abdominal pain, diarrhea, constipation, blood in stool and melena.   Genitourinary: Negative for dysuria, urgency, frequency, hematuria and flank pain.   Musculoskeletal: Negative for myalgias, back pain, joint pain and falls.   Skin: Negative for itching and rash.   Neurological: Negative for dizziness, tingling, tremors, sensory change, speech change, focal weakness, seizures, loss of consciousness, weakness and headaches.   Endo/Heme/Allergies: Negative for environmental allergies and polydipsia. Does not bruise/bleed easily.   Psychiatric/Behavioral: Negative for depression, suicidal ideas, hallucinations, memory loss and substance abuse. The patient is not nervous/anxious and does not have insomnia.    All 14 systems reviewed and negative except as noted above.  Balance of review of systems is negative.             Past Medical History:   Diagnosis  Date    Aortic valve stenosis     s/p mechanical AVR    Atrial fibrillation     Atrial flutter     Cardiomyopathy     CHF (congestive heart failure)     Drug-induced erectile dysfunction     ESRD due to hypertension     HD M, W, F    ESRD on dialysis     Hepatitis B     Hypertension     Secondary hyperparathyroidism of renal origin     Supraventricular tachycardia     atrial tachycardia    Tachycardia     Valvular regurgitation     AI, TR        Pulmonary HTN       Bilateral pleural effusions     Past Surgical History:   Procedure Laterality Date    ABLATION N/A 3/13/2017    Performed by Nigel Guallpa MD at [facility] CATH LAB    ASD REPAIR      age 8 [facility]    AV Graft Creation Left 03/2017    CARDIAC CATHETERIZATION      [facility]     CARDIAC VALVE SURGERY  02/08/2017    22 mm Medtronic AV, 28 mm TV Medtronic annuloplaty ring    [id number] Accuseal Left 3/16/2017    Performed by Alcides Manning MD at [facility] OR 2ND FLR    RADIOFREQUENCY ABLATION  03/13/2017    Atrial tachycardia    REDO STERNOTOMY WITH AORTIC VALVE REPLACEMENT/redo sternotomy N/A 2/8/2017    Performed by Jose Gastelum MD at [facility] OR 2ND FLR    REPAIR-VALVE-TRICUSPID  2/8/2017    Performed by Jose Gastelum MD at [facility] OR 2ND FLR    REPLACEMENT-VALVE-AORTIC N/A 2/8/2017    Performed by Jose Gastelum MD at [facility] OR 2ND FLR        S/p heart cath in 6/2018, pulmonary pressures better       Thoracentesis     Family History   Problem Relation Age of Onset    Leukemia Mother     Heart attack Father         massive MI at age 67    No Known Problems Sister     No Known Problems Brother     No Known Problems Sister     No Known Problems Sister     Heart failure Paternal Grandmother     Diabetes Paternal Aunt     Hypertension Paternal Aunt     Leukemia Paternal Aunt         CLL    Suicide Paternal Uncle     Anesthesia problems Paternal Uncle     Diabetes Paternal Aunt     Stroke  Paternal Aunt     Valvular heart disease Maternal Aunt     Kidney disease Neg Hx     Colon cancer Neg Hx        Social History     Socioeconomic History    Marital status: Single     Spouse name: Not on file    Number of children: Not on file    Years of education: Not on file    Highest education level: Not on file   Social Needs    Financial resource strain: Not on file    Food insecurity - worry: Not on file    Food insecurity - inability: Not on file    Transportation needs - medical: Not on file    Transportation needs - non-medical: Not on file   Occupational History    Not on file   Tobacco Use    Smoking status: Never Smoker    Smokeless tobacco: Never Used   Substance and Sexual Activity    Alcohol use: No     Frequency: Never     Comment: quit in 2016; does have heavy drinking history; started drinking at age 13; was drinking a 12 pack over the weekend and two 24 ounces of beer a day during the week along with a pint of hard alcohol    Drug use: No    Sexual activity: Not on file   Other Topics Concern    Not on file   Social History Narrative    Not on file       Current Outpatient Medications   Medication Sig Dispense Refill    amLODIPine (NORVASC) 10 MG tablet Take 1 tablet (10 mg total) by mouth once daily. 30 tablet 8    ascorbic acid, vitamin C, (VITAMIN C) 500 MG tablet Take 1 tablet (500 mg total) by mouth 2 (two) times daily.      atorvastatin (LIPITOR) 40 MG tablet Take 40 mg by mouth once daily.      calcium acetate (PHOSLO) 667 mg capsule Take 1 capsule (667 mg total) by mouth 3 (three) times daily. 90 capsule 11    diltiaZEM (CARDIZEM CD) 360 MG 24 hr capsule Take 1 capsule (360 mg total) by mouth once daily. 90 capsule 1    epoetin vikki (PROCRIT) 10,000 unit/mL injection Inject 1 mL (10,000 Units total) into the skin every Mon, Wed, Fri. To be given with HD      furosemide (LASIX) 80 MG tablet TAKE ONE TABLET BY MOUTH EVERY DAY 30 tablet 9    hydrOXYzine HCl  (ATARAX) 25 MG tablet Take 1 tablet (25 mg total) by mouth 3 (three) times daily as needed for Itching. 60 tablet 0    labetalol (NORMODYNE) 100 MG tablet Take 100 mg by mouth once daily. If heart rate greater than 120      lisinopril (PRINIVIL,ZESTRIL) 40 MG tablet TAKE ONE TABLET BY MOUTH EVERY DAY 30 tablet 9    metoprolol succinate (TOPROL-XL) 200 MG 24 hr tablet TAKE ONE TABLET BY MOUTH EVERY DAY 90 tablet 2    omega-3 fatty acids-vitamin E (FISH OIL) 1,000 mg Cap Take 1 capsule by mouth once daily.  0    pantoprazole (PROTONIX) 40 MG tablet TAKE ONE TABLET BY MOUTH EVERY DAY 30 tablet 9    ETHAN-RACQUEL RX 1- mg-mg-mcg Tab TAKE ONE TABLET BY MOUTH EVERY DAY 90 tablet 2    warfarin (COUMADIN) 7.5 MG tablet Take 1 tablet (7.5 mg total) by mouth Daily. 30 tablet 11    zolpidem (AMBIEN) 5 MG Tab TAKE ONE TABLET BY MOUTH EACH EVENING AT BEDTIME AS NEEDED FOR INSOMNIA 30 tablet 4     No current facility-administered medications for this visit.        Review of patient's allergies indicates:  No Known Allergies      There were no vitals filed for this visit.          Physical Exam   Nursing Notes and Vital Signs Reviewed.     Constitutional: Well developed, well nourished. AAOx3, NAD, speech/ comprehension clear   Head: Atraumatic. Normocephalic.   Eyes: PERRL. EOMI. Conjunctivae are not pale. No scleral icterus.   ENT: Mucous membranes are dry. No tongue tremors. Throat clear.  Neck: Supple. No JVD or LN or Carotid Bruits noted B.  Cardiovascular: S1S2 RRR, no murmurs, rubs, or gallops. Distal pulses are 2+ and symmetric.   Pulmonary/Chest: No evidence of respiratory distress. Clear to auscultation bilaterally. No wheezing, rales or rhonchi. No chest wall TTP.   Abdominal: Soft, distended. There is no tenderness. No rebound, guarding, or rigidity. No organomegaly. No mass or viscera palpable  Musculoskeletal: FROM in all extremities. No deformities, no TTP, no edema. No midline spinal TTP. No step-offs.  Pelvis is stable to compression. No cyanosis. Moves all four extremities.   Skin: Skin is warm and dry.   Neurological: No gross neurological deficits, Strength 5/5 B, is equal in the upper and lower extremities bilaterally. No sensory deficits to light touch. No pronator drift.  DTRs are 2+ and equal throughout.   Psychiatric: Good eye contact. Normal Affect.      Laboratory Data:  Reviewed and noted in plan where applicable- Please see chart for full laboratory data.       Lab Results   Component Value Date    WBC 5.36 01/08/2019    HGB 10.4 (L) 01/08/2019    HCT 30.8 (L) 01/08/2019     (H) 01/08/2019     (L) 01/08/2019     BMP  Lab Results   Component Value Date     01/08/2019    K 4.4 01/08/2019     01/08/2019    CO2 28 01/08/2019    BUN 73 (H) 01/08/2019    CREATININE 13.1 (H) 01/08/2019    CALCIUM 9.3 01/08/2019    ANIONGAP 11 01/08/2019    ESTGFRAFRICA 4.6 (A) 01/08/2019    EGFRNONAA 4.0 (A) 01/08/2019     CMP  Sodium   Date Value Ref Range Status   01/08/2019 140 136 - 145 mmol/L Final     Potassium   Date Value Ref Range Status   01/08/2019 4.4 3.5 - 5.1 mmol/L Final     Chloride   Date Value Ref Range Status   01/08/2019 101 95 - 110 mmol/L Final     CO2   Date Value Ref Range Status   01/08/2019 28 23 - 29 mmol/L Final     Glucose   Date Value Ref Range Status   01/08/2019 88 70 - 110 mg/dL Final     BUN, Bld   Date Value Ref Range Status   01/08/2019 73 (H) 6 - 20 mg/dL Final     Creatinine   Date Value Ref Range Status   01/08/2019 13.1 (H) 0.5 - 1.4 mg/dL Final     Calcium   Date Value Ref Range Status   01/08/2019 9.3 8.7 - 10.5 mg/dL Final     Total Protein   Date Value Ref Range Status   01/08/2019 8.7 (H) 6.0 - 8.4 g/dL Final     Albumin   Date Value Ref Range Status   01/08/2019 3.1 (L) 3.5 - 5.2 g/dL Final     Total Bilirubin   Date Value Ref Range Status   01/08/2019 0.6 0.1 - 1.0 mg/dL Final     Comment:     For infants and newborns, interpretation of results should  be based  on gestational age, weight and in agreement with clinical  observations.  Premature Infant recommended reference ranges:  Up to 24 hours.............<8.0 mg/dL  Up to 48 hours............<12.0 mg/dL  3-5 days..................<15.0 mg/dL  6-29 days.................<15.0 mg/dL       Alkaline Phosphatase   Date Value Ref Range Status   01/08/2019 77 55 - 135 U/L Final     AST   Date Value Ref Range Status   01/08/2019 59 (H) 10 - 40 U/L Final     ALT   Date Value Ref Range Status   01/08/2019 112 (H) 10 - 44 U/L Final     Anion Gap   Date Value Ref Range Status   01/08/2019 11 8 - 16 mmol/L Final     eGFR if    Date Value Ref Range Status   01/08/2019 4.6 (A) >60 mL/min/1.73 m^2 Final     eGFR if non    Date Value Ref Range Status   01/08/2019 4.0 (A) >60 mL/min/1.73 m^2 Final     Comment:     Calculation used to obtain the estimated glomerular filtration  rate (eGFR) is the CKD-EPI equation.        Lab Results   Component Value Date    CALCIUM 9.3 01/08/2019    PHOS 6.2 (H) 01/08/2019     Lab Results   Component Value Date    K 4.4 01/08/2019     Lab Results   Component Value Date    LABPROT 14.8 (H) 01/08/2019    ALBUMIN 3.1 (L) 01/08/2019     No results found for: LABA1C, HGBA1C    BMP  @[id number](GLU,NA,K,Cl,CO2,BUN,Creatinine,Calcium,MG)@      Radiology:  Reviewed and noted in plan where applicable- Please see chart for full radiology data.            ASSESSMENT/PLAN:     Patient Active Problem List   Diagnosis    Essential hypertension    Hypertensive cardiomegaly with heart failure    Pulmonary HTN    Nonrheumatic aortic valve stenosis    Adult congenital heart disease    Hyperglycemia    S/P AVR (aortic valve replacement)    Postprocedural hypotension    ESRD on dialysis    Pleural effusion    Severe protein-calorie malnutrition    Anemia of chronic disease    Chronic combined systolic and diastolic heart failure    Nonischemic cardiomyopathy    Atrial  tachycardia    History of radiofrequency ablation for complex right atrial arrhythmia    Drug-induced erectile dysfunction    Chronic pulmonary heart disease    Secondary hyperparathyroidism of renal origin         PLAN:      Assessment and plan:    1.  ESRD: Doing very well on  dialysis    2.  Anemia continue Epogen and iron per protocol    3.  Hypertension much better controlled    4.  Hyperparathyroidism treat with vitamin D therapy.      Tara Yoon, DNP

## 2019-01-30 NOTE — PROGRESS NOTES
History & Physical      Chief Complaint:  H&P    HPI:        Patient is a 49 year old  male who is a hemodialysis patient at the [facility] HD unit. His current dialysis prescription is a follows: Dialyzer: OP 180NR. Dialysate: Ca 2.5, K 2.0. Duration of treatment is 270 min 3 times a week on MWF.  mL/min.  mL/min. Current hemodialysis access is a left upper arm graft, has positive thrill, positive bruit. EDW 96.5 kg.           ROS:        Constitutional: Negative for fever, chills, weight loss, malaise/fatigue and diaphoresis.   HENT: Negative for hearing loss, ear pain, nosebleeds, congestion, sore throat, neck pain, tinnitus and ear discharge.    Eyes: Negative for blurred vision, double vision, photophobia, pain, discharge and redness.   Respiratory: Negative for cough, hemoptysis, sputum production, shortness of breath, wheezing and stridor.    Cardiovascular: Negative for chest pain, palpitations, orthopnea, claudication, leg swelling and PND.   Gastrointestinal: Negative for heartburn, nausea, vomiting, abdominal pain, diarrhea, constipation, blood in stool and melena.   Genitourinary: Negative for dysuria, urgency, frequency, hematuria and flank pain.   Musculoskeletal: Negative for myalgias, back pain, joint pain and falls.   Skin: Negative for itching and rash.   Neurological: Negative for dizziness, tingling, tremors, sensory change, speech change, focal weakness, seizures, loss of consciousness, weakness and headaches.   Endo/Heme/Allergies: Negative for environmental allergies and polydipsia. Does not bruise/bleed easily.   Psychiatric/Behavioral: Negative for depression, suicidal ideas, hallucinations, memory loss and substance abuse. The patient is not nervous/anxious and does not have insomnia.    All 14 systems reviewed and negative except as noted above.  Balance of review of systems is negative.       PMHx:      Past Medical History:    Diagnosis Date    A-fib     On Xarelto 20 qd since 3/2017    Anxiety     CHF (congestive heart failure) 03/24/2018    [facility]    ESRD (end stage renal disease) on dialysis     HCV antibody positive 04/13/2018    Hypertension     Kidney disease     Pancytopenia     Recurrent pleural effusion on right     Started following catheter placement c/b pneumothorax         +Hepatitis B         Acute SVT         Bilateral pleural effusions         Pulmonary HTN     PMSx:      Past Surgical History:   Procedure Laterality Date    ARTERIAL BYPASS SURGRY      bilateral breast cyst excisions      bilateral kidney cancer      s/p bilateral nephrectomy    CHOLECYSTECTOMY      COLONOSCOPY W/ POLYPECTOMY  02/04/2016    DR. LAMBERT TOMPKINS / [facility]. TUBALR ADENOMA MID TRANSVERSE AND DISTAL SIGMOID COLON, EARLY HYPERPLASTIC SPLENIC FLEXURE . REPEAT 3 YRS    left shoulder repair      NEPHRECTOMY Bilateral 2005    PARATHYROIDECTOMY  LU[place] on 3/16/17 [place] [facility] [place]           Thoracentesis         S/p heart cath in 6/2018, pulmonary pressures better        S/p AVR   Social Hx:      Social History     Social History    Marital status: Single     Spouse name: N/A    Number of children: N/A    Years of education: N/A     Occupational History    Not on file.     Social History Main Topics    Smoking status: Light Tobacco Smoker    Smokeless tobacco: Never Used    Alcohol use No    Drug use: Unknown    Sexual activity: Not on file     Other Topics Concern    Not on file     Social History Narrative    No narrative on file        Family Hx:      No family history on file.     VITALS:          Physical Exam   Nursing Notes and Vital Signs Reviewed.     Constitutional: Well developed, well nourished. AAOx3, NAD, speech/ comprehension clear   Head: Atraumatic. Normocephalic.   Eyes: PERRL. EOMI. Conjunctivae are not pale. No scleral icterus.   ENT: Mucous membranes are dry. No tongue tremors. Throat  clear.  Neck: Supple. No JVD or LN or Carotid Bruits noted B.  Cardiovascular: S1S2 RRR, no murmurs, rubs, or gallops. Distal pulses are 2+ and symmetric.   Pulmonary/Chest: No evidence of respiratory distress. Clear to auscultation bilaterally. No wheezing, rales or rhonchi. No chest wall TTP.   Abdominal: Soft, distended. There is no tenderness. No rebound, guarding, or rigidity. No organomegaly. No mass or viscera palpable  Musculoskeletal: FROM in all extremities. No deformities, no TTP, no edema. No midline spinal TTP. No step-offs. Pelvis is stable to compression. No cyanosis. Moves all four extremities.   Skin: Skin is warm and dry.   Neurological: No gross neurological deficits, Strength 5/5 B, is equal in the upper and lower extremities bilaterally. No sensory deficits to light touch. No pronator drift.  DTRs are 2+ and equal throughout.   Psychiatric: Good eye contact. Normal Affect.      Laboratory Data:  Reviewed and noted in plan where applicable- Please see chart for full laboratory data.         Lab Results   Component Value Date    INR 1.1 01/02/2013    INR 1.1 11/06/2012       Lab Results   Component Value Date    WBC 3.83 (L) 04/11/2018    HGB 10.8 (L) 04/11/2018    HCT 32.8 (L) 04/11/2018     (H) 04/11/2018    PLT 90 (L) 04/11/2018       BMP  @[id number](GLU,NA,K,Cl,CO2,BUN,Creatinine,Calcium,MG)@      Radiology:  Reviewed and noted in plan where applicable- Please see chart for full radiology data.    Medications:  Current Outpatient Prescriptions   Medication Sig    atenolol (TENORMIN) 100 MG tablet take 1 tablet by mouth once daily    AURYXIA 210 mg iron Tab TAKE (2) TABLETS THREE TIMES DAILY WITH MEALS.    calcitRIOL (ROCALTROL) 0.5 MCG Cap TAKE  (1)  CAPSULE  TWICE DAILY.    clobetasol 0.05% (TEMOVATE) 0.05 % Oint Apply topically 2 (two) times daily.    cloNIDine (CATAPRES) 0.1 MG tablet TAKE ONE TABLET BY MOUTH THREE TIMES DAILY    hydrALAZINE (APRESOLINE) 100 MG tablet TAKE  ONE TABLET BY MOUTH TWICE DAILY    hydrOXYzine pamoate (VISTARIL) 50 MG Cap Take 1 capsule (50 mg total) by mouth nightly as needed (insomnia, anxiety, itchiness).    morphine (MSIR) 15 MG tablet Take 15 mg by mouth 2 (two) times daily.    NIFEDIAC CC 90 mg TbSR TAKE ONE TABLET BY MOUTH TWICE DAILY    ondansetron (ZOFRAN) 4 MG tablet Take 1 tablet (4 mg total) by mouth every 8 (eight) hours as needed for Nausea.    oxymorphone (OPANA) 10 MG tablet Take 10 mg by mouth 4 (four) times daily.    RENVELA 800 mg Tab TAKE 4 TABLETS THREE TIMES DAILY WITH MEALS AND 3 WITH SNACKS    XARELTO 20 mg Tab      No current facility-administered medications for this visit.          ASSESSMENT/PLAN:     ACTIVE PROBLEMS:    Patient Active Problem List   Diagnosis    Hypertension    Anemia in ESRD (end-stage renal disease)    Chronic hepatitis C without hepatic coma    Thrombocytopenia    History of colon polyps           PLAN:      Assessment and plan:    1.  ESRD: Doing very well on  dialysis    2.  Anemia continue Epogen and iron per protocol    3.  Hypertension much better controlled    4.  Hyperparathyroidism treat with vitamin D therapy.      Tara Yoon, DNP

## 2019-01-31 ENCOUNTER — TELEPHONE (OUTPATIENT)
Dept: CARDIOLOGY | Facility: CLINIC | Age: 49
End: 2019-01-31

## 2019-01-31 DIAGNOSIS — I49.3 PVC'S (PREMATURE VENTRICULAR CONTRACTIONS): ICD-10-CM

## 2019-01-31 DIAGNOSIS — I10 ESSENTIAL HYPERTENSION: ICD-10-CM

## 2019-01-31 RX ORDER — DILTIAZEM HYDROCHLORIDE 360 MG/1
360 CAPSULE, EXTENDED RELEASE ORAL DAILY
Qty: 90 CAPSULE | Refills: 1 | Status: SHIPPED | OUTPATIENT
Start: 2019-01-31 | End: 2020-05-27 | Stop reason: SDUPTHER

## 2019-02-05 ENCOUNTER — HOSPITAL ENCOUNTER (OUTPATIENT)
Dept: RADIOLOGY | Facility: HOSPITAL | Age: 49
Discharge: HOME | End: 2019-02-05
Attending: NURSE PRACTITIONER
Payer: COMMERCIAL

## 2019-02-05 DIAGNOSIS — Z76.82 ORGAN TRANSPLANT CANDIDATE: ICD-10-CM

## 2019-02-05 PROCEDURE — 76700 US ABDOMEN COMPLETE: ICD-10-PCS | Mod: 26,TXP,, | Performed by: RADIOLOGY

## 2019-02-05 PROCEDURE — 76700 US EXAM ABDOM COMPLETE: CPT | Mod: 26,TXP,, | Performed by: RADIOLOGY

## 2019-02-05 PROCEDURE — 76700 US EXAM ABDOM COMPLETE: CPT | Mod: TC,TXP

## 2019-02-12 ENCOUNTER — PROCEDURE VISIT (OUTPATIENT)
Dept: HEPATOLOGY | Facility: CLINIC | Age: 49
End: 2019-02-12
Attending: NURSE PRACTITIONER
Payer: MEDICAID

## 2019-02-12 ENCOUNTER — OFFICE VISIT (OUTPATIENT)
Dept: HEPATOLOGY | Facility: CLINIC | Age: 49
End: 2019-02-12
Payer: COMMERCIAL

## 2019-02-12 ENCOUNTER — CLINICAL SUPPORT (OUTPATIENT)
Dept: CARDIOLOGY | Facility: CLINIC | Age: 49
End: 2019-02-12
Attending: NURSE PRACTITIONER
Payer: COMMERCIAL

## 2019-02-12 VITALS
SYSTOLIC BLOOD PRESSURE: 147 MMHG | DIASTOLIC BLOOD PRESSURE: 73 MMHG | BODY MASS INDEX: 28.01 KG/M2 | WEIGHT: 218.25 LBS | HEART RATE: 82 BPM | HEIGHT: 74 IN | OXYGEN SATURATION: 94 % | RESPIRATION RATE: 18 BRPM

## 2019-02-12 DIAGNOSIS — Z76.82 ORGAN TRANSPLANT CANDIDATE: ICD-10-CM

## 2019-02-12 DIAGNOSIS — B18.1 CHRONIC VIRAL HEPATITIS B WITHOUT DELTA AGENT AND WITHOUT COMA: Primary | ICD-10-CM

## 2019-02-12 DIAGNOSIS — B18.1 CHRONIC VIRAL HEPATITIS B WITHOUT DELTA AGENT AND WITHOUT COMA: ICD-10-CM

## 2019-02-12 DIAGNOSIS — Z99.2 ESRD ON DIALYSIS: Chronic | ICD-10-CM

## 2019-02-12 DIAGNOSIS — N18.6 ESRD ON DIALYSIS: Chronic | ICD-10-CM

## 2019-02-12 LAB
CV STRESS BASE HR: 71 BPM
DIASTOLIC BLOOD PRESSURE: 82 MMHG
END DIASTOLIC INDEX-HIGH: 171 ML/M2
END SYSTOLIC INDEX-HIGH: 70 ML/M2
NUC REST DIASTOLIC VOLUME INDEX: 148
NUC REST EJECTION FRACTION: 75
NUC REST SYSTOLIC VOLUME INDEX: 37
OHS CV CPX 1 MINUTE RECOVERY HEART RATE: 84 BPM
OHS CV CPX 85 PERCENT MAX PREDICTED HEART RATE MALE: 146
OHS CV CPX MAX PREDICTED HEART RATE: 172
OHS CV CPX PATIENT IS FEMALE: 0
OHS CV CPX PATIENT IS MALE: 1
OHS CV CPX PEAK DIASTOLIC BLOOD PRESSURE: 82 MMHG
OHS CV CPX PEAK HEAR RATE: 70 BPM
OHS CV CPX PEAK RATE PRESSURE PRODUCT: NORMAL
OHS CV CPX PEAK SYSTOLIC BLOOD PRESSURE: 144 MMHG
OHS CV CPX PERCENT MAX PREDICTED HEART RATE ACHIEVED: 41
OHS CV CPX RATE PRESSURE PRODUCT PRESENTING: NORMAL
RETIRED EF AND QEF - SEE NOTES: 51 %
SYSTOLIC BLOOD PRESSURE: 144 MMHG

## 2019-02-12 PROCEDURE — 93015 PR CARDIAC STRESS TST,COMPLETE: ICD-10-PCS | Mod: S$GLB,TXP,, | Performed by: INTERNAL MEDICINE

## 2019-02-12 PROCEDURE — 99999 PR PBB SHADOW E&M-EST. PATIENT-LVL V: ICD-10-PCS | Mod: PBBFAC,TXP,, | Performed by: NURSE PRACTITIONER

## 2019-02-12 PROCEDURE — 78452 CHG MYOCARDIAL SPECT MULTIPLE STUDIES: ICD-10-PCS | Mod: 26,S$GLB,TXP, | Performed by: INTERNAL MEDICINE

## 2019-02-12 PROCEDURE — A9502 TC99M TETROFOSMIN: HCPCS | Mod: S$GLB,TXP,, | Performed by: INTERNAL MEDICINE

## 2019-02-12 PROCEDURE — 78452 HT MUSCLE IMAGE SPECT MULT: CPT | Mod: 26,S$GLB,TXP, | Performed by: INTERNAL MEDICINE

## 2019-02-12 PROCEDURE — A9502 PR TC99M TETROFOSMIN: ICD-10-PCS | Mod: S$GLB,TXP,, | Performed by: INTERNAL MEDICINE

## 2019-02-12 PROCEDURE — 91200 LIVER ELASTOGRAPHY: CPT | Mod: S$GLB,TXP,, | Performed by: NURSE PRACTITIONER

## 2019-02-12 PROCEDURE — 99204 PR OFFICE/OUTPT VISIT, NEW, LEVL IV, 45-59 MIN: ICD-10-PCS | Mod: S$GLB,TXP,, | Performed by: NURSE PRACTITIONER

## 2019-02-12 PROCEDURE — 99999 PR PBB SHADOW E&M-EST. PATIENT-LVL V: CPT | Mod: PBBFAC,TXP,, | Performed by: NURSE PRACTITIONER

## 2019-02-12 PROCEDURE — 91200 PR LIVER ELASTOGRAPHY W/OUT IMAG W/INTERP & REPORT: ICD-10-PCS | Mod: S$GLB,TXP,, | Performed by: NURSE PRACTITIONER

## 2019-02-12 PROCEDURE — 99204 OFFICE O/P NEW MOD 45 MIN: CPT | Mod: S$GLB,TXP,, | Performed by: NURSE PRACTITIONER

## 2019-02-12 PROCEDURE — 93015 CV STRESS TEST SUPVJ I&R: CPT | Mod: S$GLB,TXP,, | Performed by: INTERNAL MEDICINE

## 2019-02-12 PROCEDURE — 99215 OFFICE O/P EST HI 40 MIN: CPT | Mod: PBBFAC,TXP | Performed by: NURSE PRACTITIONER

## 2019-02-12 RX ORDER — REGADENOSON 0.08 MG/ML
0.4 INJECTION, SOLUTION INTRAVENOUS
Status: COMPLETED | OUTPATIENT
Start: 2019-02-12 | End: 2019-02-12

## 2019-02-12 RX ADMIN — REGADENOSON 0.4 MG: 0.08 INJECTION, SOLUTION INTRAVENOUS at 10:02

## 2019-02-12 NOTE — LETTER
February 13, 2019      Sloane Lui NP  [address]  [facility]  1st Floor Clinic  [facility] [address]           [facility]  [address]  [facility] [address]  Phone: [phone]  Fax: [phone]          Patient: Isidro White Jr.   MR Number: [id number]   YOB: 1970   Date of Visit: 2/12/2019       Dear Sloane Lui:    Thank you for referring Isidro White to me for evaluation. Attached you will find relevant portions of my assessment and plan of care.    If you have questions, please do not hesitate to call me. I look forward to following Isidro White along with you.    Sincerely,    Lina Dominguez, ALEX    Enclosure  CC:  No Recipients    If you would like to receive this communication electronically, please contact [email or web address] or [phone] to request more information on [employer] access.    For providers and/or their staff who would like to refer a patient to [facility], please contact us through our one-stop-shop provider referral line, [facility], at [phone].    If you feel you have received this communication in error or would no longer like to receive these types of communications, please e-mail [email or web address]

## 2019-02-12 NOTE — PROGRESS NOTES
I have personally performed a face to face diagnostic evaluation on this patient. I have reviewed and agree with today's findings and the care plan outlined by Lina Dominguez NP      My findings are as follows:  Patient presents with eAg + chronic HBV. Active viremia. Will require virologic control before proceeding with kidney transplant. Will start vemlidy. Will stage liver disease with fibroscan.       he will return to Lina Dominguez NP/  for follow-up.

## 2019-02-12 NOTE — PROGRESS NOTES
[facility] Visit New Patient Note    REFERRING PROVIDER: Sloane Lui    CHIEF COMPLAINT: Hepatitis B; pre-kidney transplant     HPI: This is a 49 y.o. Black or  male referred for evaluation of chronic hepatitis B. He is currently being evaluated for kidney transplant. ESRD presumed secondary to hypertension, has been on dialysis for the past 2 years (HD MWF). Other noted history: NICM, s/p AVR with mechanical valve. Also has history of heavy alcohol use (quit in 2016).     He reports being told about the hepatitis B when he had surgery a few years ago. Has never received HBV treatment; has never seen a hepatologist. Risk factors for hepatitis exposure - tattoos while in [employer]. No family history of hepatitis B or other liver disease. No IV or intranasal drug use. No known exposure from sexual contacts. Lives with his brother.      He denies any symptoms of hepatic decompensation including jaundice, dark urine, hematemesis, melena, slowed mentation, abdominal distention, or lower extremity edema.     Review of labs:  - Hep B surface Ag (+) since 2/2012  - Hep B E Ag (+), E Ab (-)  - HBV DNA 76,840,946  - Negative for Hep C and HIV   Lab Results   Component Value Date     (H) 02/05/2019    AST 64 (H) 02/05/2019    ALKPHOS 91 02/05/2019    BILITOT 0.7 02/05/2019    ALBUMIN 3.1 (L) 02/05/2019    INR 1.5 (H) 01/08/2019     (L) 01/08/2019   *INR elevated on coumadin     Abd  (2/5/19): liver normal size (15.2 cm), no focal hepatic lesions, spleen normal size (11.2 cm), no ascites         Review of patient's allergies indicates:  No Known Allergies     Current Outpatient Medications on File Prior to Visit   Medication Sig Dispense Refill    amLODIPine (NORVASC) 10 MG tablet Take 1 tablet (10 mg total) by mouth once daily. 30 tablet 8    ascorbic acid, vitamin C, (VITAMIN C) 500 MG tablet Take 1 tablet (500 mg total) by mouth 2 (two) times daily.      calcium acetate  (PHOSLO) 667 mg capsule Take 1 capsule (667 mg total) by mouth 3 (three) times daily. 90 capsule 11    diltiaZEM (CARDIZEM CD) 360 MG 24 hr capsule Take 1 capsule (360 mg total) by mouth once daily. 90 capsule 1    epoetin vikki (PROCRIT) 10,000 unit/mL injection Inject 1 mL (10,000 Units total) into the skin every Mon, Wed, Fri. To be given with HD      furosemide (LASIX) 80 MG tablet TAKE ONE TABLET BY MOUTH EVERY DAY 30 tablet 9    hydrOXYzine HCl (ATARAX) 25 MG tablet Take 1 tablet (25 mg total) by mouth 3 (three) times daily as needed for Itching. 60 tablet 0    labetalol (NORMODYNE) 100 MG tablet Take 100 mg by mouth once daily. If heart rate greater than 120      lisinopril (PRINIVIL,ZESTRIL) 40 MG tablet TAKE ONE TABLET BY MOUTH EVERY DAY 30 tablet 9    metoprolol succinate (TOPROL-XL) 200 MG 24 hr tablet TAKE ONE TABLET BY MOUTH EVERY DAY 90 tablet 2    pantoprazole (PROTONIX) 40 MG tablet TAKE ONE TABLET BY MOUTH EVERY DAY 30 tablet 9    ETHAN-RACQUEL RX 1- mg-mg-mcg Tab TAKE ONE TABLET BY MOUTH EVERY DAY 90 tablet 2    zolpidem (AMBIEN) 5 MG Tab TAKE ONE TABLET BY MOUTH EACH EVENING AT BEDTIME AS NEEDED FOR INSOMNIA 30 tablet 4    omega-3 fatty acids-vitamin E (FISH OIL) 1,000 mg Cap Take 1 capsule by mouth once daily.  0    warfarin (COUMADIN) 7.5 MG tablet Take 1 tablet (7.5 mg total) by mouth Daily. 30 tablet 11     No current facility-administered medications on file prior to visit.        PMHX:  has a past medical history of Aortic valve stenosis, Atrial fibrillation, Atrial flutter, Cardiomyopathy, CHF (congestive heart failure), Drug-induced erectile dysfunction, ESRD due to hypertension, ESRD on dialysis, Hepatitis B, Hypertension, Secondary hyperparathyroidism of renal origin, Supraventricular tachycardia, Tachycardia, and Valvular regurgitation.    PSHX:  has a past surgical history that includes ASD repair; Cardiac valuve replacement (02/08/2017); Radiofrequency ablation  "(03/13/2017); Cardiac catheterization; and AV Graft Creation (Left, 03/2017).    FAMILY HISTORY: Negative for liver disease, reviewed in EPIC.    SOCIAL HISTORY:   Social History     Tobacco Use   Smoking Status Never Smoker   Smokeless Tobacco Never Used       Social History     Substance and Sexual Activity   Alcohol Use No    Frequency: Never    Comment: quit in 2016; does have heavy drinking history; started drinking at age 13; was drinking a 12 pack over the weekend and two 24 ounces of beer a day during the week along with a pint of hard alcohol       Social History     Substance and Sexual Activity   Drug Use No       ROS:   GENERAL: Denies fever, chills, weight loss/gain. (+) fatigue  HEENT: Denies headaches, dizziness, vision/hearing changes  CARDIOVASCULAR: Denies chest pain, palpitations, or edema  RESPIRATORY: Denies dyspnea, cough  GI: Denies abdominal pain, rectal bleeding, nausea, vomiting. No change in bowel pattern or color  : Denies dysuria. (+) history of hematuria   SKIN: Denies rash. (+) itching   NEURO: Denies confusion, memory loss, or mood changes  PSYCH: Denies depression or anxiety  HEME/LYMPH: (+) easy bruising or bleeding  MSK: Denies joint pain or myalgia.     PHYSICAL EXAM:   Friendly Black or  male, in no acute distress; alert and oriented to person, place and time  VITALS: BP (!) 147/73 (BP Location: Right arm, Patient Position: Sitting, BP Method: Medium (Automatic))   Pulse 82   Resp 18   Ht 6' 2" (1.88 m)   Wt 99 kg (218 lb 4.1 oz)   SpO2 (!) 94%   BMI 28.02 kg/m²   HENT: Normocephalic, without obvious abnormality. Oral mucosa pink and moist.   EYES: Sclerae anicteric.   NECK: Supple. No masses or cervical adenopathy.  CARDIOVASCULAR: Regular rate and rhythm. (+) murmurs, mechanical valve  RESPIRATORY: Normal respiratory effort. BBS CTA. No wheezes or crackles.  GI: Soft, non-tender, non-distended. No palpable hepatosplenomegaly. No masses palpable. No " ascites.  EXTREMITIES:  No clubbing, cyanosis or edema. LUE AVG, (+) thrill/bruit  SKIN: Warm and dry. No jaundice. No rashes noted to exposed skin. No telangectasias noted. No palmar erythema.  NEURO:  Normal gait. No asterixis.  PSYCH:  Memory intact. Thought and speech pattern appropriate. Behavior normal. No depression or anxiety noted.      LABS:  Lab Results   Component Value Date    WBC 5.36 01/08/2019    HGB 10.4 (L) 01/08/2019    HCT 30.8 (L) 01/08/2019     (L) 01/08/2019     01/08/2019    K 4.4 01/08/2019    CREATININE 13.1 (H) 01/08/2019     (H) 02/05/2019    AST 64 (H) 02/05/2019    ALKPHOS 91 02/05/2019    BILITOT 0.7 02/05/2019    BILIDIR 0.3 02/05/2019    ALBUMIN 3.1 (L) 02/05/2019    INR 1.5 (H) 01/08/2019    ANASCREEN Negative <1:160 03/16/2017    FERRITIN 1,201 (H) 03/10/2017    FESATURATED 19 (L) 03/10/2017    CHOL 213 (H) 01/08/2019    TRIG 103 01/08/2019    LDLCALC 142.4 01/08/2019       No results found for: HEPAIGG    Hepatitis B Surface Ag   Date Value Ref Range Status   01/08/2019 Positive (A)  Final     Hep B Core Total Ab   Date Value Ref Range Status   01/08/2019 Positive (A)  Final     Hep B S Ab   Date Value Ref Range Status   02/05/2019 Negative  Final     Hepatitis C Ab   Date Value Ref Range Status   01/08/2019 Negative  Final     Immunization History   Administered Date(s) Administered    PPD Test 02/13/2017        DIAGNOSTIC STUDIES:  ABD. U/S - 2/5/19  FINDINGS:  Pancreas: Visualized portions of the pancreas appear grossly within normal limits noting a large portion of the pancreas is obscured by gas artifact.    Aorta: No aneurysm visualized.    Inferior vena cava: Visualized portions are normal in appearance.    Liver: Normal in size, measuring 15.2 cm. Homogeneous echotexture with no focal hepatic lesions visualized.    Biliary system: The gallbladder has no calculi. No gallbladder wall thickening.  Sonographic Gates sign is reported as negative.  No  pericholecystic fluid. The common duct is not dilated, measuring 4.5 mm.  No intrahepatic ductal dilatation.    Right kidney: Normal in size, measuring 11.5 cm with no hydronephrosis.Cortical thinning noted with diffuse increased parenchymal echogenicity, in keeping with chronic medical renal disease.  There are numerous small renal cyst present, some of which are minimally complicated by thin internal septation.  Largest complex cyst measures up to 18 mm at the upper pole.    Left kidney: Normal in size, measuring 11.6 cm with no hydronephrosis.Cortical thinning noted with diffuse increased parenchymal echogenicity, in keeping with chronic medical renal disease.  Numerous small renal cysts are present including some minimally complex cyst containing thin internal septation.  Largest complex cyst measures up to 12 mm at the lower pole.    Spleen: Normal in size, measuring 11.2 cm with a homogeneous echotexture.    Miscellaneous: No ascites.      Impression       Findings in keeping with chronic medical renal disease with numerous small renal cysts, including several minimally complex cyst as detailed above.         ASSESSMENT / PLAN:  49 y.o. Black or  male with:    1. Chronic hepatitis B, E Ag (+). HBV DNA > 76,000,000.  -- Start HBV treatment with Viread given ESRD on dialysis. Dosing will be once weekly (300 mg) after dialysis. Rx sent to specialty pharmacy.   -- Continue trending HBV DNA and LFTs once treatment started. Repeat labs ~4 weeks after starting treatment.   -- Need to obtain virologic control prior to proceeding with kidney transplant. Will need HBV treatment indefinitely.   -- Continue HCC screening every 6 months with ultrasound and AFP, next due 8/2019  -- Fibroscan today for fibrosis and steatosis staging. If suggestive of cirrhosis, will need to discuss implications in setting of possible kidney transplant.      2. ESRD on dialysis        Orders Placed This Encounter   Procedures     US Elastography Liver         EDUCATION / DISCUSSION:   -- Avoid alcohol. Avoid raw seafood.   -- Discussed transmission of HBV, including sexual transmission. Avoid sharing razors/toothbrushes, etc.   -- Limit acetaminophen to 2000 mg daily.  -- Advised immunization of all sexual partners and household members.  -- We discussed the increased risk of hepatocellular carcinoma due to active hepatitis B infection. Continued screening every six months with ultrasound and AFP is recommended.   -- Natural history of HBV including possible progression to cirrhosis reviewed. Discussed potential outcomes of cirrhosis, including liver cancer, liver failure, liver transplant, death.  -- Discussed importance of compliance with medication regimen and risk of viral mutation if treatment is stopped prematurely.       Return to clinic for follow-up in 12 weeks       Thank you for allowing me to participate in the care of Isidro White Jr.    Lina Dominguez, VERENA-C  Hepatology and Liver Transplant     Patient was seen in collaboration with Dr. Rocha       Addendum: Fibroscan kPa = 45.9, F4 or cirrhosis. CAP = 118, less than S1, <11% steatosis. Unclear if kPa elevated due to high viral HBV DNA though liver enzymes are not extremely high. Will continue to monitor patient as if he has cirrhosis. Can consider repeating Fibroscan vs liver biopsy once HBV controlled on treatment. Will have patient discussed among transplant team regarding possible cirrhosis in the setting of kidney transplant evaluation.

## 2019-02-13 PROBLEM — B18.1 CHRONIC VIRAL HEPATITIS B WITHOUT DELTA AGENT AND WITHOUT COMA: Status: ACTIVE | Noted: 2019-02-13

## 2019-02-13 RX ORDER — TENOFOVIR DISOPROXIL FUMARATE 300 MG/1
300 TABLET, FILM COATED ORAL WEEKLY
Qty: 4 TABLET | Refills: 12 | Status: SHIPPED | OUTPATIENT
Start: 2019-02-13 | End: 2019-02-22

## 2019-02-13 NOTE — PROCEDURES
Fibroscan Procedure     Name: Isidro White Jr.  Date of Procedure : 2019   :: Lina Dominguez NP  Diagnosis: HBV    Probe: XL    Fibroscan readin.9 KPa    Fibrosis:F4     CAP readin dB/m    Steatosis: :less than S1, <11% steatosis

## 2019-02-14 ENCOUNTER — INITIAL CONSULT (OUTPATIENT)
Dept: CARDIOLOGY | Facility: CLINIC | Age: 49
End: 2019-02-14
Payer: COMMERCIAL

## 2019-02-14 VITALS
HEART RATE: 80 BPM | DIASTOLIC BLOOD PRESSURE: 21 MMHG | HEIGHT: 74 IN | SYSTOLIC BLOOD PRESSURE: 136 MMHG | WEIGHT: 220 LBS | BODY MASS INDEX: 28.23 KG/M2

## 2019-02-14 DIAGNOSIS — Z99.2 ESRD ON DIALYSIS: Chronic | ICD-10-CM

## 2019-02-14 DIAGNOSIS — N18.6 ESRD ON DIALYSIS: Chronic | ICD-10-CM

## 2019-02-14 DIAGNOSIS — Z95.2 S/P AVR (AORTIC VALVE REPLACEMENT): Primary | ICD-10-CM

## 2019-02-14 DIAGNOSIS — I47.19 ATRIAL TACHYCARDIA: ICD-10-CM

## 2019-02-14 PROCEDURE — 99999 PR PBB SHADOW E&M-EST. PATIENT-LVL III: CPT | Mod: PBBFAC,TXP,, | Performed by: INTERNAL MEDICINE

## 2019-02-14 PROCEDURE — 99213 OFFICE O/P EST LOW 20 MIN: CPT | Mod: PBBFAC,TXP | Performed by: INTERNAL MEDICINE

## 2019-02-14 PROCEDURE — 99214 OFFICE O/P EST MOD 30 MIN: CPT | Mod: S$PBB,NTX,, | Performed by: INTERNAL MEDICINE

## 2019-02-14 PROCEDURE — 99214 PR OFFICE/OUTPT VISIT, EST, LEVL IV, 30-39 MIN: ICD-10-PCS | Mod: S$PBB,NTX,, | Performed by: INTERNAL MEDICINE

## 2019-02-14 PROCEDURE — 99999 PR PBB SHADOW E&M-EST. PATIENT-LVL III: ICD-10-PCS | Mod: PBBFAC,TXP,, | Performed by: INTERNAL MEDICINE

## 2019-02-14 NOTE — LETTER
February 14, 2019      Fuad Gudino MD  [address] [address] [place] [address]           [facility]  [address] [place] [address] , [address]  [place] [address]  Phone: [phone]  Fax: [phone]          Patient: Isidro White Jr.   MR Number: [id number]   YOB: 1970   Date of Visit: 2/14/2019       Dear Dr. Fuad Gudino:    Thank you for referring Isidro White to me for evaluation. Attached you will find relevant portions of my assessment and plan of care.    If you have questions, please do not hesitate to call me. I look forward to following Isidro White along with you.    Sincerely,    Gavin De León MD    Enclosure  CC:  No Recipients    If you would like to receive this communication electronically, please contact [email or web address] or [phone] to request more information on [employer] access.    For providers and/or their staff who would like to refer a patient to [facility], please contact us through our one-stop-shop provider referral line, [facility], at [phone].    If you feel you have received this communication in error or would no longer like to receive these types of communications, please e-mail [email or web address]

## 2019-02-15 ENCOUNTER — TELEPHONE (OUTPATIENT)
Dept: PHARMACY | Facility: CLINIC | Age: 49
End: 2019-02-15

## 2019-02-21 DIAGNOSIS — J90 PLEURAL EFFUSION: Primary | ICD-10-CM

## 2019-02-22 DIAGNOSIS — B18.1 CHRONIC VIRAL HEPATITIS B WITHOUT DELTA AGENT AND WITHOUT COMA: Primary | ICD-10-CM

## 2019-02-22 RX ORDER — TENOFOVIR DISOPROXIL FUMARATE 300 MG/1
300 TABLET, FILM COATED ORAL WEEKLY
Qty: 4 TABLET | Refills: 12 | Status: SHIPPED | OUTPATIENT
Start: 2019-02-22 | End: 2019-09-12 | Stop reason: SDUPTHER

## 2019-02-26 ENCOUNTER — HOSPITAL ENCOUNTER (OUTPATIENT)
Dept: RADIOLOGY | Facility: HOSPITAL | Age: 49
Discharge: HOME | End: 2019-02-26
Attending: INTERNAL MEDICINE
Payer: COMMERCIAL

## 2019-02-26 DIAGNOSIS — J90 PLEURAL EFFUSION: ICD-10-CM

## 2019-02-26 PROCEDURE — 71046 X-RAY EXAM CHEST 2 VIEWS: CPT | Mod: 26,NTX,, | Performed by: RADIOLOGY

## 2019-02-26 PROCEDURE — 71046 X-RAY EXAM CHEST 2 VIEWS: CPT | Mod: TC,NTX

## 2019-02-26 PROCEDURE — 71046 XR CHEST PA AND LATERAL: ICD-10-PCS | Mod: 26,NTX,, | Performed by: RADIOLOGY

## 2019-02-28 ENCOUNTER — OFFICE VISIT (OUTPATIENT)
Dept: UROLOGY | Facility: CLINIC | Age: 49
End: 2019-02-28
Payer: COMMERCIAL

## 2019-02-28 VITALS
DIASTOLIC BLOOD PRESSURE: 80 MMHG | WEIGHT: 216.06 LBS | HEART RATE: 84 BPM | HEIGHT: 74 IN | BODY MASS INDEX: 27.73 KG/M2 | SYSTOLIC BLOOD PRESSURE: 154 MMHG

## 2019-02-28 DIAGNOSIS — R31.9 HEMATURIA, UNSPECIFIED TYPE: Primary | ICD-10-CM

## 2019-02-28 PROCEDURE — 99204 PR OFFICE/OUTPT VISIT, NEW, LEVL IV, 45-59 MIN: ICD-10-PCS | Mod: S$PBB,NTX,, | Performed by: UROLOGY

## 2019-02-28 PROCEDURE — 99999 PR PBB SHADOW E&M-EST. PATIENT-LVL III: ICD-10-PCS | Mod: PBBFAC,TXP,, | Performed by: UROLOGY

## 2019-02-28 PROCEDURE — 99213 OFFICE O/P EST LOW 20 MIN: CPT | Mod: PBBFAC,NTX | Performed by: UROLOGY

## 2019-02-28 PROCEDURE — 99204 OFFICE O/P NEW MOD 45 MIN: CPT | Mod: S$PBB,NTX,, | Performed by: UROLOGY

## 2019-02-28 PROCEDURE — 99999 PR PBB SHADOW E&M-EST. PATIENT-LVL III: CPT | Mod: PBBFAC,TXP,, | Performed by: UROLOGY

## 2019-02-28 NOTE — LETTER
February 28, 2019      Gus Hurst MD  [address]  [place] [address]           [facility]  [address] [place] [address]  Phone: [phone]  Fax: [phone]          Patient: Isidro White Jr.   MR Number: [id number]   YOB: 1970   Date of Visit: 2/28/2019       Dear Dr. Gus Hurst:    Thank you for referring Isidro White to me for evaluation. Attached you will find relevant portions of my assessment and plan of care.    If you have questions, please do not hesitate to call me. I look forward to following Isidro White along with you.    Sincerely,    Braxton Bass IV, MD    Enclosure  CC:  No Recipients    If you would like to receive this communication electronically, please contact [email or web address] or [phone] to request more information on [employer] Link access.    For providers and/or their staff who would like to refer a patient to [facility], please contact us through our one-stop-shop provider referral line, [facility], at [phone].    If you feel you have received this communication in error or would no longer like to receive these types of communications, please e-mail [email or web address]

## 2019-02-28 NOTE — PROGRESS NOTES
Chief Complaint: Hematuria    HPI:   2/28/19: 50 yo man voids about once a day since he is a MWF dialysis pt.  Saw a clot and gross hematuria about 2-3 weeks ago on one occasion.  No abd/pelvic pain and no exac/rel factors.  No urolithiasis.  Weak stream.  No  history.  Normal sexual function until recently.  Had a CT Urogram that shows no abnormalities except renal cystic function consistent with ESRD.    Allergies:  Patient has no known allergies.    Medications:  has a current medication list which includes the following prescription(s): amlodipine, ascorbic acid (vitamin c), calcium acetate, diltiazem, epoetin vikki, furosemide, hydroxyzine hcl, labetalol, lisinopril, metoprolol succinate, pantoprazole, shawna-nataly rx, tenofovir, warfarin, zolpidem, and omega-3 fatty acids-vitamin e.    Review of Systems:  General: No fever, chills, fatigability, or weight loss.  Skin: No rashes, itching, or changes in color or texture of skin.  Chest: Denies HAY, cyanosis, wheezing, cough, and sputum production.  Abdomen: Appetite fine. No weight loss. Denies diarrhea, abdominal pain, hematemesis, or blood in stool.  Musculoskeletal: No joint stiffness or swelling. Denies back pain.  : As above.  All other review of systems negative.    PMH:   has a past medical history of Aortic valve stenosis, Atrial fibrillation, Atrial flutter, Cardiomyopathy, CHF (congestive heart failure), Drug-induced erectile dysfunction, ESRD due to hypertension, ESRD on dialysis, Hepatitis B, Hypertension, Secondary hyperparathyroidism of renal origin, Supraventricular tachycardia, Tachycardia, and Valvular regurgitation.    PSH:   has a past surgical history that includes ASD repair; Cardiac valuve replacement (02/08/2017); Radiofrequency ablation (03/13/2017); Cardiac catheterization; and AV Graft Creation (Left, 03/2017).    FamHx: family history includes Anesthesia problems in his paternal uncle; Diabetes in his paternal aunt and paternal aunt;  Heart attack in his father; Heart failure in his paternal grandmother; Hypertension in his paternal aunt; Leukemia in his mother and paternal aunt; No Known Problems in his brother, sister, sister, and sister; Stroke in his paternal aunt; Suicide in his paternal uncle; Valvular heart disease in his maternal aunt.    SocHx:  reports that  has never smoked. he has never used smokeless tobacco. He reports that he does not drink alcohol or use drugs.      Physical Exam:  Vitals:    02/28/19 1514   BP: (!) 154/80   Pulse: 84     General: A&Ox3, no apparent distress, no deformities  Neck: No masses, normal thyroid  Lungs: normal inspiration, no use of accessory muscles  Heart: normal pulse, no arrhythmias  Abdomen: Soft, NT, ND, no masses, no hernias, no hepatosplenomegaly  Lymphatic: Neck and groin nodes negative  Skin: The skin is warm and dry. No jaundice.  Ext: No c/c/e.  : Test desc felicita, no abnormalities of epididymus. Penis normal, with normal penile and scrotal skin. Meatus normal.     Labs/Studies:     Impression/Plan:   1. CT Urogram shows no sig abnormalities, RTC for cystoscopy.

## 2019-03-14 DIAGNOSIS — I10 ESSENTIAL HYPERTENSION: Primary | ICD-10-CM

## 2019-03-15 ENCOUNTER — TELEPHONE (OUTPATIENT)
Dept: TRANSPLANT | Facility: CLINIC | Age: 49
End: 2019-03-15

## 2019-03-15 ENCOUNTER — CLINICAL SUPPORT (OUTPATIENT)
Dept: CARDIOLOGY | Facility: CLINIC | Age: 49
End: 2019-03-15
Payer: COMMERCIAL

## 2019-03-15 ENCOUNTER — OFFICE VISIT (OUTPATIENT)
Dept: CARDIOLOGY | Facility: CLINIC | Age: 49
End: 2019-03-15
Payer: COMMERCIAL

## 2019-03-15 VITALS
HEART RATE: 84 BPM | SYSTOLIC BLOOD PRESSURE: 130 MMHG | DIASTOLIC BLOOD PRESSURE: 76 MMHG | HEIGHT: 74 IN | WEIGHT: 216 LBS | BODY MASS INDEX: 27.72 KG/M2

## 2019-03-15 DIAGNOSIS — N18.6 ESRD ON DIALYSIS: Chronic | ICD-10-CM

## 2019-03-15 DIAGNOSIS — D63.8 ANEMIA OF CHRONIC DISEASE: ICD-10-CM

## 2019-03-15 DIAGNOSIS — I10 ESSENTIAL HYPERTENSION: ICD-10-CM

## 2019-03-15 DIAGNOSIS — Z99.2 ESRD ON DIALYSIS: Chronic | ICD-10-CM

## 2019-03-15 DIAGNOSIS — Q24.9 ADULT CONGENITAL HEART DISEASE: ICD-10-CM

## 2019-03-15 DIAGNOSIS — G47.30 SLEEP APNEA, UNSPECIFIED TYPE: ICD-10-CM

## 2019-03-15 DIAGNOSIS — I35.0 NONRHEUMATIC AORTIC VALVE STENOSIS: ICD-10-CM

## 2019-03-15 DIAGNOSIS — I42.8 NONISCHEMIC CARDIOMYOPATHY: ICD-10-CM

## 2019-03-15 DIAGNOSIS — Z98.890 HISTORY OF RADIOFREQUENCY ABLATION FOR COMPLEX RIGHT ATRIAL ARRHYTHMIA: ICD-10-CM

## 2019-03-15 DIAGNOSIS — Z95.2 S/P AVR (AORTIC VALVE REPLACEMENT): ICD-10-CM

## 2019-03-15 DIAGNOSIS — I11.0 HYPERTENSIVE CARDIOMEGALY WITH HEART FAILURE: ICD-10-CM

## 2019-03-15 DIAGNOSIS — I50.42 CHRONIC COMBINED SYSTOLIC AND DIASTOLIC HEART FAILURE: Primary | ICD-10-CM

## 2019-03-15 PROCEDURE — 99213 OFFICE O/P EST LOW 20 MIN: CPT | Mod: PBBFAC,PN,TXP | Performed by: INTERNAL MEDICINE

## 2019-03-15 PROCEDURE — 99214 OFFICE O/P EST MOD 30 MIN: CPT | Mod: S$PBB,25,NTX, | Performed by: INTERNAL MEDICINE

## 2019-03-15 PROCEDURE — 93010 EKG 12-LEAD: ICD-10-PCS | Mod: S$PBB,NTX,, | Performed by: INTERNAL MEDICINE

## 2019-03-15 PROCEDURE — 93005 ELECTROCARDIOGRAM TRACING: CPT | Mod: PBBFAC,PN,NTX | Performed by: INTERNAL MEDICINE

## 2019-03-15 PROCEDURE — 99214 PR OFFICE/OUTPT VISIT, EST, LEVL IV, 30-39 MIN: ICD-10-PCS | Mod: S$PBB,25,NTX, | Performed by: INTERNAL MEDICINE

## 2019-03-15 PROCEDURE — 99999 PR PBB SHADOW E&M-EST. PATIENT-LVL III: CPT | Mod: PBBFAC,TXP,, | Performed by: INTERNAL MEDICINE

## 2019-03-15 PROCEDURE — 93010 ELECTROCARDIOGRAM REPORT: CPT | Mod: S$PBB,NTX,, | Performed by: INTERNAL MEDICINE

## 2019-03-15 PROCEDURE — 99999 PR PBB SHADOW E&M-EST. PATIENT-LVL III: ICD-10-PCS | Mod: PBBFAC,TXP,, | Performed by: INTERNAL MEDICINE

## 2019-03-15 RX ORDER — MULTIVIT WITH MINERALS/HERBS
1 TABLET ORAL DAILY
COMMUNITY

## 2019-03-15 NOTE — TELEPHONE ENCOUNTER
----- Message from Rikki Pimentel RN sent at 3/15/2019  2:33 PM CDT -----  Contact: [phone]      ----- Message -----  From: Sandy Guerrero  Sent: 3/15/2019   2:18 PM  To: [facility] Pre-Kidney Transplant Non-Clinical    Pt states he was told by Dr. Gudino at [facility] in   to contact transplant coordinator to assist in getting him an appt with sleep apnea clinic- please contact pt at [phone]

## 2019-03-15 NOTE — TELEPHONE ENCOUNTER
Mr White called asking if transplant could order a sleep study for him due to ?chest pain and waking up at night because of snoring. He was told by cardiology this needed to be done but they could not order. I advised Mr White since transplant is not his treatment physician we can only order testing related to transplant workup. I clarified that he has a PCP and advised that he should speak with his PCP about his symptoms and to get appropriated testing ordered. He verbalized understanding.

## 2019-03-22 ENCOUNTER — TELEPHONE (OUTPATIENT)
Dept: TRANSPLANT | Facility: CLINIC | Age: 49
End: 2019-03-22

## 2019-03-22 DIAGNOSIS — B18.1 CHRONIC HEPATITIS B: Primary | ICD-10-CM

## 2019-03-22 NOTE — TELEPHONE ENCOUNTER
Please reach out to patient to confirm that he has received Viread / tenofovir and has started taking this medication. Dosing should be once a week after dialysis.     Labs reviewed in care everywhere, liver enzymes have almost normalized (AST 33, ALT 63). Liver function remains stable.

## 2019-03-25 RX ORDER — LABETALOL 100 MG/1
TABLET, FILM COATED ORAL
Qty: 90 TABLET | Refills: 3 | Status: SHIPPED | OUTPATIENT
Start: 2019-03-25 | End: 2019-10-03 | Stop reason: SDUPTHER

## 2019-03-27 ENCOUNTER — HOSPITAL ENCOUNTER (OUTPATIENT)
Facility: HOSPITAL | Age: 49
Discharge: HOME | End: 2019-03-28
Attending: EMERGENCY MEDICINE | Admitting: INTERNAL MEDICINE
Payer: MEDICAID

## 2019-03-27 DIAGNOSIS — Z99.2 ESRD ON DIALYSIS: Chronic | ICD-10-CM

## 2019-03-27 DIAGNOSIS — R06.02 SOB (SHORTNESS OF BREATH): ICD-10-CM

## 2019-03-27 DIAGNOSIS — N18.6 ESRD ON DIALYSIS: Chronic | ICD-10-CM

## 2019-03-27 DIAGNOSIS — J18.9 PNEUMONIA OF RIGHT LOWER LOBE DUE TO INFECTIOUS ORGANISM: Primary | ICD-10-CM

## 2019-03-27 DIAGNOSIS — B18.1 CHRONIC VIRAL HEPATITIS B WITHOUT DELTA AGENT AND WITHOUT COMA: ICD-10-CM

## 2019-03-27 DIAGNOSIS — I10 ESSENTIAL HYPERTENSION: ICD-10-CM

## 2019-03-27 DIAGNOSIS — R07.9 CHEST PAIN: ICD-10-CM

## 2019-03-27 DIAGNOSIS — I50.32 CHRONIC DIASTOLIC HEART FAILURE: ICD-10-CM

## 2019-03-27 PROBLEM — I48.91 ATRIAL FIBRILLATION: Status: ACTIVE | Noted: 2019-03-27

## 2019-03-27 LAB
ALBUMIN SERPL BCP-MCNC: 3.2 G/DL (ref 3.5–5.2)
ALP SERPL-CCNC: 80 U/L (ref 55–135)
ALT SERPL W/O P-5'-P-CCNC: 50 U/L (ref 10–44)
ANION GAP SERPL CALC-SCNC: 14 MMOL/L (ref 8–16)
ANISOCYTOSIS BLD QL SMEAR: SLIGHT
AST SERPL-CCNC: 32 U/L (ref 10–40)
BASOPHILS # BLD AUTO: 0.02 K/UL (ref 0–0.2)
BASOPHILS NFR BLD: 0.3 % (ref 0–1.9)
BILIRUB SERPL-MCNC: 0.8 MG/DL (ref 0.1–1)
BNP SERPL-MCNC: 1355 PG/ML (ref 0–99)
BUN SERPL-MCNC: 40 MG/DL (ref 6–20)
CALCIUM SERPL-MCNC: 9.8 MG/DL (ref 8.7–10.5)
CHLORIDE SERPL-SCNC: 98 MMOL/L (ref 95–110)
CK SERPL-CCNC: 348 U/L (ref 20–200)
CO2 SERPL-SCNC: 26 MMOL/L (ref 23–29)
CREAT SERPL-MCNC: 9.4 MG/DL (ref 0.5–1.4)
DIFFERENTIAL METHOD: ABNORMAL
EOSINOPHIL # BLD AUTO: 0.9 K/UL (ref 0–0.5)
EOSINOPHIL NFR BLD: 14.6 % (ref 0–8)
ERYTHROCYTE [DISTWIDTH] IN BLOOD BY AUTOMATED COUNT: 14.5 % (ref 11.5–14.5)
EST. GFR  (AFRICAN AMERICAN): 7 ML/MIN/1.73 M^2
EST. GFR  (NON AFRICAN AMERICAN): 6 ML/MIN/1.73 M^2
GLUCOSE SERPL-MCNC: 92 MG/DL (ref 70–110)
HCT VFR BLD AUTO: 25.8 % (ref 40–54)
HGB BLD-MCNC: 8.7 G/DL (ref 14–18)
HYPOCHROMIA BLD QL SMEAR: ABNORMAL
INR PPP: 1.3 (ref 0.8–1.2)
INR PPP: 1.4 (ref 0.8–1.2)
LACTATE SERPL-SCNC: 1.1 MMOL/L (ref 0.5–2.2)
LYMPHOCYTES # BLD AUTO: 1.4 K/UL (ref 1–4.8)
LYMPHOCYTES NFR BLD: 22.2 % (ref 18–48)
MCH RBC QN AUTO: 36.3 PG (ref 27–31)
MCHC RBC AUTO-ENTMCNC: 33.7 G/DL (ref 32–36)
MCV RBC AUTO: 108 FL (ref 82–98)
MONOCYTES # BLD AUTO: 0.6 K/UL (ref 0.3–1)
MONOCYTES NFR BLD: 9.3 % (ref 4–15)
NEUTROPHILS # BLD AUTO: 3.5 K/UL (ref 1.8–7.7)
NEUTROPHILS NFR BLD: 53.9 % (ref 38–73)
OVALOCYTES BLD QL SMEAR: ABNORMAL
PLATELET # BLD AUTO: 118 K/UL (ref 150–350)
PLATELET BLD QL SMEAR: ABNORMAL
PMV BLD AUTO: 10.3 FL (ref 9.2–12.9)
POIKILOCYTOSIS BLD QL SMEAR: SLIGHT
POLYCHROMASIA BLD QL SMEAR: ABNORMAL
POTASSIUM SERPL-SCNC: 3.8 MMOL/L (ref 3.5–5.1)
PROT SERPL-MCNC: 8.4 G/DL (ref 6–8.4)
PROTHROMBIN TIME: 13.8 SEC (ref 9–12.5)
PROTHROMBIN TIME: 15.1 SEC (ref 9–12.5)
RBC # BLD AUTO: 2.4 M/UL (ref 4.6–6.2)
SODIUM SERPL-SCNC: 138 MMOL/L (ref 136–145)
STOMATOCYTES BLD QL SMEAR: PRESENT
TROPONIN I SERPL DL<=0.01 NG/ML-MCNC: 0.06 NG/ML (ref 0–0.03)
TROPONIN I SERPL DL<=0.01 NG/ML-MCNC: 0.06 NG/ML (ref 0–0.03)
TROPONIN I SERPL DL<=0.01 NG/ML-MCNC: 0.07 NG/ML (ref 0–0.03)
VANCOMYCIN SERPL-MCNC: 33.6 UG/ML
WBC # BLD AUTO: 6.45 K/UL (ref 3.9–12.7)

## 2019-03-27 PROCEDURE — 63600175 PHARM REV CODE 636 W HCPCS: Mod: NTX | Performed by: EMERGENCY MEDICINE

## 2019-03-27 PROCEDURE — 82550 ASSAY OF CK (CPK): CPT | Mod: NTX

## 2019-03-27 PROCEDURE — 84484 ASSAY OF TROPONIN QUANT: CPT | Mod: 91,NTX

## 2019-03-27 PROCEDURE — G0378 HOSPITAL OBSERVATION PER HR: HCPCS | Mod: NTX

## 2019-03-27 PROCEDURE — 63600175 PHARM REV CODE 636 W HCPCS: Mod: NTX | Performed by: INTERNAL MEDICINE

## 2019-03-27 PROCEDURE — 96375 TX/PRO/DX INJ NEW DRUG ADDON: CPT | Mod: 59

## 2019-03-27 PROCEDURE — 36415 COLL VENOUS BLD VENIPUNCTURE: CPT | Mod: NTX

## 2019-03-27 PROCEDURE — 85610 PROTHROMBIN TIME: CPT | Mod: NTX

## 2019-03-27 PROCEDURE — 84484 ASSAY OF TROPONIN QUANT: CPT | Mod: NTX

## 2019-03-27 PROCEDURE — 25000003 PHARM REV CODE 250: Mod: NTX | Performed by: INTERNAL MEDICINE

## 2019-03-27 PROCEDURE — 94640 AIRWAY INHALATION TREATMENT: CPT | Mod: NTX

## 2019-03-27 PROCEDURE — 99215 OFFICE O/P EST HI 40 MIN: CPT | Mod: NTX,,, | Performed by: INTERNAL MEDICINE

## 2019-03-27 PROCEDURE — 25000003 PHARM REV CODE 250: Mod: NTX | Performed by: NURSE PRACTITIONER

## 2019-03-27 PROCEDURE — 93010 EKG 12-LEAD: ICD-10-PCS | Mod: NTX,,, | Performed by: INTERNAL MEDICINE

## 2019-03-27 PROCEDURE — 99291 CRITICAL CARE FIRST HOUR: CPT | Mod: 25,NTX

## 2019-03-27 PROCEDURE — 85025 COMPLETE CBC W/AUTO DIFF WBC: CPT | Mod: NTX

## 2019-03-27 PROCEDURE — 93005 ELECTROCARDIOGRAM TRACING: CPT | Mod: NTX

## 2019-03-27 PROCEDURE — 96374 THER/PROPH/DIAG INJ IV PUSH: CPT | Mod: NTX

## 2019-03-27 PROCEDURE — 25000003 PHARM REV CODE 250: Mod: NTX | Performed by: EMERGENCY MEDICINE

## 2019-03-27 PROCEDURE — 96365 THER/PROPH/DIAG IV INF INIT: CPT | Mod: NTX

## 2019-03-27 PROCEDURE — 93010 ELECTROCARDIOGRAM REPORT: CPT | Mod: NTX,,, | Performed by: INTERNAL MEDICINE

## 2019-03-27 PROCEDURE — 99215 PR OFFICE/OUTPT VISIT, EST, LEVL V, 40-54 MIN: ICD-10-PCS | Mod: NTX,,, | Performed by: INTERNAL MEDICINE

## 2019-03-27 PROCEDURE — 25000242 PHARM REV CODE 250 ALT 637 W/ HCPCS: Mod: NTX | Performed by: NURSE PRACTITIONER

## 2019-03-27 PROCEDURE — 83880 ASSAY OF NATRIURETIC PEPTIDE: CPT | Mod: NTX

## 2019-03-27 PROCEDURE — 83605 ASSAY OF LACTIC ACID: CPT | Mod: NTX

## 2019-03-27 PROCEDURE — 80053 COMPREHEN METABOLIC PANEL: CPT | Mod: NTX

## 2019-03-27 PROCEDURE — 87040 BLOOD CULTURE FOR BACTERIA: CPT | Mod: 59,NTX

## 2019-03-27 PROCEDURE — 80202 ASSAY OF VANCOMYCIN: CPT | Mod: NTX

## 2019-03-27 RX ORDER — HYDRALAZINE HYDROCHLORIDE 25 MG/1
25 TABLET, FILM COATED ORAL EVERY 8 HOURS
Status: DISCONTINUED | OUTPATIENT
Start: 2019-03-27 | End: 2019-03-28 | Stop reason: HOSPADM

## 2019-03-27 RX ORDER — DILTIAZEM HYDROCHLORIDE 180 MG/1
360 CAPSULE, COATED, EXTENDED RELEASE ORAL DAILY
Status: DISCONTINUED | OUTPATIENT
Start: 2019-03-27 | End: 2019-03-28 | Stop reason: HOSPADM

## 2019-03-27 RX ORDER — VANCOMYCIN HCL IN 5 % DEXTROSE 1.5G/250ML
1500 PLASTIC BAG, INJECTION (ML) INTRAVENOUS
Status: DISCONTINUED | OUTPATIENT
Start: 2019-04-05 | End: 2019-03-28 | Stop reason: HOSPADM

## 2019-03-27 RX ORDER — ASCORBIC ACID 500 MG
500 TABLET ORAL 2 TIMES DAILY
Status: DISCONTINUED | OUTPATIENT
Start: 2019-03-27 | End: 2019-03-28 | Stop reason: HOSPADM

## 2019-03-27 RX ORDER — PANTOPRAZOLE SODIUM 40 MG/1
40 TABLET, DELAYED RELEASE ORAL DAILY
Status: DISCONTINUED | OUTPATIENT
Start: 2019-03-27 | End: 2019-03-28 | Stop reason: HOSPADM

## 2019-03-27 RX ORDER — ZOLPIDEM TARTRATE 5 MG/1
5 TABLET ORAL NIGHTLY PRN
Status: DISCONTINUED | OUTPATIENT
Start: 2019-03-27 | End: 2019-03-28 | Stop reason: HOSPADM

## 2019-03-27 RX ORDER — HYDROXYZINE HYDROCHLORIDE 25 MG/1
25 TABLET, FILM COATED ORAL 3 TIMES DAILY PRN
Status: DISCONTINUED | OUTPATIENT
Start: 2019-03-27 | End: 2019-03-28 | Stop reason: HOSPADM

## 2019-03-27 RX ORDER — AMLODIPINE BESYLATE 10 MG/1
10 TABLET ORAL DAILY
Status: DISCONTINUED | OUTPATIENT
Start: 2019-03-27 | End: 2019-03-28 | Stop reason: HOSPADM

## 2019-03-27 RX ORDER — CALCIUM ACETATE 667 MG/1
667 CAPSULE ORAL 3 TIMES DAILY
Status: DISCONTINUED | OUTPATIENT
Start: 2019-03-27 | End: 2019-03-28 | Stop reason: HOSPADM

## 2019-03-27 RX ORDER — LISINOPRIL 20 MG/1
40 TABLET ORAL DAILY
Status: DISCONTINUED | OUTPATIENT
Start: 2019-03-27 | End: 2019-03-28 | Stop reason: HOSPADM

## 2019-03-27 RX ORDER — IPRATROPIUM BROMIDE AND ALBUTEROL SULFATE 2.5; .5 MG/3ML; MG/3ML
3 SOLUTION RESPIRATORY (INHALATION) EVERY 6 HOURS
Status: DISCONTINUED | OUTPATIENT
Start: 2019-03-27 | End: 2019-03-28 | Stop reason: HOSPADM

## 2019-03-27 RX ORDER — ONDANSETRON 2 MG/ML
4 INJECTION INTRAMUSCULAR; INTRAVENOUS EVERY 12 HOURS PRN
Status: DISCONTINUED | OUTPATIENT
Start: 2019-03-27 | End: 2019-03-28 | Stop reason: HOSPADM

## 2019-03-27 RX ORDER — FUROSEMIDE 80 MG/1
80 TABLET ORAL DAILY
Status: DISCONTINUED | OUTPATIENT
Start: 2019-03-27 | End: 2019-03-27

## 2019-03-27 RX ORDER — ACETAMINOPHEN 325 MG/1
650 TABLET ORAL EVERY 8 HOURS PRN
Status: DISCONTINUED | OUTPATIENT
Start: 2019-03-27 | End: 2019-03-28 | Stop reason: HOSPADM

## 2019-03-27 RX ORDER — SODIUM CHLORIDE 0.9 % (FLUSH) 0.9 %
5 SYRINGE (ML) INJECTION
Status: DISCONTINUED | OUTPATIENT
Start: 2019-03-27 | End: 2019-03-28 | Stop reason: HOSPADM

## 2019-03-27 RX ADMIN — PIPERACILLIN AND TAZOBACTAM 4.5 G: 4; .5 INJECTION, POWDER, LYOPHILIZED, FOR SOLUTION INTRAVENOUS; PARENTERAL at 12:03

## 2019-03-27 RX ADMIN — IPRATROPIUM BROMIDE AND ALBUTEROL SULFATE 3 ML: .5; 3 SOLUTION RESPIRATORY (INHALATION) at 07:03

## 2019-03-27 RX ADMIN — VANCOMYCIN HYDROCHLORIDE 2000 MG: 500 INJECTION, POWDER, LYOPHILIZED, FOR SOLUTION INTRAVENOUS at 01:03

## 2019-03-27 RX ADMIN — OXYCODONE HYDROCHLORIDE AND ACETAMINOPHEN 500 MG: 500 TABLET ORAL at 08:03

## 2019-03-27 RX ADMIN — HYDRALAZINE HYDROCHLORIDE 25 MG: 25 TABLET, FILM COATED ORAL at 09:03

## 2019-03-27 RX ADMIN — WARFARIN SODIUM 7.5 MG: 2.5 TABLET ORAL at 06:03

## 2019-03-27 RX ADMIN — Medication 2.25 G: at 08:03

## 2019-03-27 RX ADMIN — CALCIUM ACETATE 667 MG: 667 CAPSULE ORAL at 08:03

## 2019-03-27 NOTE — ED PROVIDER NOTES
SCRIBE #1 NOTE: I, Corinne Mack, am scribing for, and in the presence of, David Adamson MD. I have scribed the entire note.      History      Chief Complaint   Patient presents with    Chest Pain     R side CP x 2 weeks. told to come in by Larissa. +SOB. +dialysis       Review of patient's allergies indicates:  No Known Allergies     HPI   HPI    3/27/2019, 10:29 AM   History obtained from the patient      History of Present Illness: Isidro White Jr. is a 49 y.o. male patient with PMHx of Afib, cardiomyopathy, CHF, ESRD, Hep B, and HTN who presents to the Emergency Department for CP which onset gradually today while the pt was at dialysis. Pt was referred to the ED for further evaluation by Dr. Hurst (Nephrology). Symptoms are intermittent and moderate in severity. No mitigating or exacerbating factors reported. Associated sxs include SOB and sleep apnea. Patient denies any fever, chills, diaphoresis, N/V, abd pain, back pain, neck pain, HA, dizziness, and all other sxs at this time. No prior Tx reported. No further complaints or concerns at this time.         Arrival mode: Personal vehicle    PCP: Provider [facility]       Past Medical History:  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     HD M, W, F    ESRD on dialysis     Hepatitis B     Hyperlipidemia     Hypertension     Secondary hyperparathyroidism of renal origin     Supraventricular tachycardia     atrial tachycardia    Tachycardia     Valvular regurgitation     AI, TR       Past Surgical History:  Past Surgical History:   Procedure Laterality Date    ABLATION N/A 3/13/2017    Performed by Nigel Guallpa MD at [facility] CATH LAB    ASD REPAIR      age 8 [facility]    AV Graft Creation Left 03/2017    CARDIAC CATHETERIZATION       [facility]     CARDIAC VALVE SURGERY  02/08/2017    22 mm Medtronic AV, 28 mm TV Medtronic annuloplaty ring    [id number] Accuseal Left 3/16/2017    Performed by Alcides Manning MD at [facility] OR 2ND FLR    RADIOFREQUENCY ABLATION  03/13/2017    Atrial tachycardia    REDO STERNOTOMY WITH AORTIC VALVE REPLACEMENT/redo sternotomy N/A 2/8/2017    Performed by Jose Gastelum MD at [facility] OR 2ND FLR    REPAIR-VALVE-TRICUSPID  2/8/2017    Performed by Jose [facility]elum MD at [facility] OR 2ND FLR    REPLACEMENT-VALVE-AORTIC N/A 2/8/2017    Performed by Jose Gastelum MD at [facility] OR 2ND FLR         Family History:  Family History   Problem Relation Age of Onset    Leukemia Mother     Heart attack Father         massive MI at age 67    No Known Problems Sister     No Known Problems Brother     No Known Problems Sister     No Known Problems Sister     Heart failure Paternal Grandmother     Diabetes Paternal Aunt     Hypertension Paternal Aunt     Leukemia Paternal Aunt         CLL    Suicide Paternal Uncle     Anesthesia problems Paternal Uncle     Diabetes Paternal Aunt     Stroke Paternal Aunt     Valvular heart disease Maternal Aunt     Kidney disease Neg Hx     Colon cancer Neg Hx        Social History:  Social History     Tobacco Use    Smoking status: Never Smoker    Smokeless tobacco: Never Used   Substance and Sexual Activity    Alcohol use: No     Frequency: Never     Comment: quit in 2016; does have heavy drinking history; started drinking at age 13; was drinking a 12 pack over the weekend and two 24 ounces of beer a day during the week along with a pint of hard alcohol    Drug use: No    Sexual activity: Unknown       ROS   Review of Systems   Constitutional: Negative for chills, diaphoresis and fever.   Respiratory: Positive for apnea (sleep) and shortness of breath. Negative for cough.    Cardiovascular: Positive for chest pain. Negative for leg  swelling.   Gastrointestinal: Negative for abdominal pain, diarrhea, nausea and vomiting.   Musculoskeletal: Negative for back pain, neck pain and neck stiffness.   Skin: Negative for rash and wound.   Neurological: Negative for dizziness, light-headedness, numbness and headaches.   All other systems reviewed and are negative.    Physical Exam      Initial Vitals [03/27/19 1023]   BP Pulse Resp Temp SpO2   (!) 170/79 83 20 97.7 °F (36.5 °C) (!) 94 %      MAP       --          Physical Exam  Nursing Notes and Vital Signs Reviewed.  Constitutional: Patient is in no acute distress. Well-developed and well-nourished.  Head: Atraumatic. Normocephalic.  Eyes: PERRL. EOM intact. Conjunctivae are not pale. No scleral icterus.  ENT: Mucous membranes are moist. Oropharynx is clear and symmetric.    Neck: Supple. Full ROM. No lymphadenopathy.  Cardiovascular: Regular rate. Regular rhythm. No murmurs, rubs, or gallops. Distal pulses are 2+ and symmetric.  Pulmonary/Chest: No respiratory distress. Clear to auscultation bilaterally. No wheezing or rales.  Abdominal: Soft and non-distended.  There is no tenderness.  No rebound, guarding, or rigidity.   Musculoskeletal: Moves all extremities. No obvious deformities. Trace BLE edema. Shunt to the LUE.  Skin: Warm and dry.  Neurological:  Alert, awake, and appropriate.  Normal speech.  No acute focal neurological deficits are appreciated.  Psychiatric: Normal affect. Good eye contact. Appropriate in content.    ED Course    Critical Care  Date/Time: 3/27/2019 11:58 AM  Performed by: David Adamson MD  Authorized by: David Adamson MD   Direct patient critical care time: 24 minutes  Additional history critical care time: 7 minutes  Ordering / reviewing critical care time: 8 minutes  Documentation critical care time: 11 minutes  Consulting other physicians critical care time: 10 minutes  Total critical care time (exclusive of procedural time) : 60 minutes  Critical care time  "was exclusive of separately billable procedures and treating other patients and teaching time.  Critical care was necessary to treat or prevent imminent or life-threatening deterioration of the following conditions: cardiac failure.  Critical care was time spent personally by me on the following activities: blood draw for specimens, development of treatment plan with patient or surrogate, discussions with consultants, interpretation of cardiac output measurements, evaluation of patient's response to treatment, examination of patient, obtaining history from patient or surrogate, ordering and performing treatments and interventions, ordering and review of laboratory studies, ordering and review of radiographic studies, pulse oximetry, re-evaluation of patient's condition and review of old charts.        ED Vital Signs:  Vitals:    03/27/19 1023   BP: (!) 170/79   Pulse: 83   Resp: 20   Temp: 97.7 °F (36.5 °C)   TempSrc: Oral   SpO2: (!) 94%   Weight: 98.1 kg (216 lb 4.3 oz)   Height: 6' 2" (1.88 m)       Abnormal Lab Results:  Labs Reviewed   CBC W/ AUTO DIFFERENTIAL - Abnormal; Notable for the following components:       Result Value    RBC 2.40 (*)     Hemoglobin 8.7 (*)     Hematocrit 25.8 (*)      (*)     MCH 36.3 (*)     Platelets 118 (*)     Eos # 0.9 (*)     Eosinophil% 14.6 (*)     Platelet Estimate Decreased (*)     All other components within normal limits   COMPREHENSIVE METABOLIC PANEL - Abnormal; Notable for the following components:    BUN, Bld 40 (*)     Creatinine 9.4 (*)     Albumin 3.2 (*)     ALT 50 (*)     eGFR if  7 (*)     eGFR if non  6 (*)     All other components within normal limits   CK - Abnormal; Notable for the following components:     (*)     All other components within normal limits   B-TYPE NATRIURETIC PEPTIDE - Abnormal; Notable for the following components:    BNP 1,355 (*)     All other components within normal limits   TROPONIN I - " Abnormal; Notable for the following components:    Troponin I 0.067 (*)     All other components within normal limits   CULTURE, BLOOD   CULTURE, BLOOD   LACTIC ACID, PLASMA   LACTIC ACID, PLASMA        All Lab Results:  Results for orders placed or performed during the hospital encounter of 03/27/19   CBC auto differential   Result Value Ref Range    WBC 6.45 3.90 - 12.70 K/uL    RBC 2.40 (L) 4.60 - 6.20 M/uL    Hemoglobin 8.7 (L) 14.0 - 18.0 g/dL    Hematocrit 25.8 (L) 40.0 - 54.0 %     (H) 82 - 98 fL    MCH 36.3 (H) 27.0 - 31.0 pg    MCHC 33.7 32.0 - 36.0 g/dL    RDW 14.5 11.5 - 14.5 %    Platelets 118 (L) 150 - 350 K/uL    MPV 10.3 9.2 - 12.9 fL    Gran # (ANC) 3.5 1.8 - 7.7 K/uL    Lymph # 1.4 1.0 - 4.8 K/uL    Mono # 0.6 0.3 - 1.0 K/uL    Eos # 0.9 (H) 0.0 - 0.5 K/uL    Baso # 0.02 0.00 - 0.20 K/uL    Gran% 53.9 38.0 - 73.0 %    Lymph% 22.2 18.0 - 48.0 %    Mono% 9.3 4.0 - 15.0 %    Eosinophil% 14.6 (H) 0.0 - 8.0 %    Basophil% 0.3 0.0 - 1.9 %    Platelet Estimate Decreased (A)     Aniso Slight     Poik Slight     Poly Occasional     Hypo Occasional     Ovalocytes Occasional     Stomatocytes Present     Differential Method Automated    Comprehensive metabolic panel   Result Value Ref Range    Sodium 138 136 - 145 mmol/L    Potassium 3.8 3.5 - 5.1 mmol/L    Chloride 98 95 - 110 mmol/L    CO2 26 23 - 29 mmol/L    Glucose 92 70 - 110 mg/dL    BUN, Bld 40 (H) 6 - 20 mg/dL    Creatinine 9.4 (H) 0.5 - 1.4 mg/dL    Calcium 9.8 8.7 - 10.5 mg/dL    Total Protein 8.4 6.0 - 8.4 g/dL    Albumin 3.2 (L) 3.5 - 5.2 g/dL    Total Bilirubin 0.8 0.1 - 1.0 mg/dL    Alkaline Phosphatase 80 55 - 135 U/L    AST 32 10 - 40 U/L    ALT 50 (H) 10 - 44 U/L    Anion Gap 14 8 - 16 mmol/L    eGFR if African American 7 (A) >60 mL/min/1.73 m^2    eGFR if non African American 6 (A) >60 mL/min/1.73 m^2   CK   Result Value Ref Range     (H) 20 - 200 U/L   Brain natriuretic peptide   Result Value Ref Range    BNP 1,355 (H) 0 - 99  pg/mL   Troponin I   Result Value Ref Range    Troponin I 0.067 (H) 0.000 - 0.026 ng/mL       Imaging Results:  Imaging Results          X-Ray Chest AP Portable (Final result)  Result time 03/27/19 10:51:28    Final result by MERVAT Will Sr., MD (03/27/19 10:51:28)                 Impression:      1. There has been interval development of a mild amount of haziness in the midportion of the right lung.  This is consistent with the patient's history and characteristic of pneumonia.  2. The right costophrenic angle is blunted.  This is characteristic of a tiny pleural effusion.  3. There are persistent streaky opacities in the lower half of the left lung.  This is characteristic of scarring.  4. The size of the heart is prominent.  This may be secondary to magnification.  5. Surgical changes  .      Electronically signed by: James Will MD  Date:    03/27/2019  Time:    10:51             Narrative:    EXAMINATION:  XR CHEST AP PORTABLE    CLINICAL HISTORY:  chest pain;    COMPARISON:  02/26/2019    FINDINGS:  There are multiple sternotomy wires in place.  The size of the heart is prominent.  There has been interval development of a mild amount of haziness in the midportion of the right lung.  There are persistent streaky opacities in the lower half of the left lung.  There is no pneumothorax.  The left costophrenic angle is sharp.  The right costophrenic angle is blunted.                                 The EKG was ordered, reviewed, and independently interpreted by the ED provider.  Interpretation time: 1046  Rate: 84 BPM  Rhythm: normal sinus rhythm  Interpretation: L axis deviation. Nonspecific intraventricular block. T wave abnormality. No STEMI.           The Emergency Provider reviewed the vital signs and test results, which are outlined above.    ED Discussion     11:43 AM: Re-evaluated pt. I have discussed test results, shared treatment plan, and the need for admission with patient's family at bedside.  "Pt's family expresses understanding at this time and agrees with all information. All questions answered. Pt's family has no further questions or concerns at this time. Pt is ready for admit.    11:57 AM: Discussed case with Consuelo Gomez NP ([facility]) (Hospital Medicine). Dr. De La Cruz agrees with current care and management of pt and accepts admission.   Admitting Service: Hospital medicine   Admitting Physician: Dr. De La Cruz  Admit to: Obs Tele    ED Medication(s):  Medications   piperacillin-tazobactam 4.5 g in dextrose 5 % 100 mL IVPB (ready to mix system) (4.5 g Intravenous New Bag 3/27/19 1225)   vancomycin 2 g in dextrose 5 % 500 mL IVPB (has no administration in time range)          Medication List      ASK your doctor about these medications    amLODIPine 10 MG tablet  Commonly known as:  NORVASC  Take 1 tablet (10 mg total) by mouth once daily.     ascorbic acid (vitamin C) 500 MG tablet  Commonly known as:  VITAMIN C  Take 1 tablet (500 mg total) by mouth 2 (two) times daily.     b complex vitamins tablet     calcium acetate 667 mg capsule  Commonly known as:  PHOSLO  Take 1 capsule (667 mg total) by mouth 3 (three) times daily.     diltiaZEM 360 MG 24 hr capsule  Commonly known as:  CARDIZEM CD  Take 1 capsule (360 mg total) by mouth once daily.     epoetin vikki 10,000 unit/mL injection  Commonly known as:  PROCRIT  Inject 1 mL (10,000 Units total) into the skin every Mon, Wed, Fri. To be given with HD     furosemide 80 MG tablet  Commonly known as:  LASIX  TAKE ONE TABLET BY MOUTH EVERY DAY     hydrOXYzine HCl 25 MG tablet  Commonly known as:  ATARAX  Take 1 tablet (25 mg total) by mouth 3 (three) times daily as needed for Itching.     labetalol 100 MG tablet  Commonly known as:  NORMODYNE  TAKE ONE TABLET BY MOUTH THREE TIMES DAILY AS NEEDED IF HEART RATE GREATER THAN 120     lisinopril 40 MG tablet  Commonly known as:  PRINIVIL,ZESTRIL  TAKE ONE TABLET BY MOUTH EVERY DAY     multivitamin with minerals " tablet     omega-3 fatty acids-vitamin E 1,000 mg Cap  Commonly known as:  FISH OIL  Take 1 capsule by mouth once daily.     pantoprazole 40 MG tablet  Commonly known as:  PROTONIX  TAKE ONE TABLET BY MOUTH EVERY DAY     ETHAN-RACQUEL RX 1- mg-mg-mcg Tab  Generic drug:  vit b cmplx 3-fa-vit c-biotin 1- mg-mg-mcg  TAKE ONE TABLET BY MOUTH EVERY DAY     tenofovir 300 mg Tab  Commonly known as:  VIREAD  Take 1 tablet (300 mg total) by mouth once a week. AFTER DIALYSIS     warfarin 7.5 MG tablet  Commonly known as:  COUMADIN  Take 1 tablet (7.5 mg total) by mouth Daily.     zolpidem 5 MG Tab  Commonly known as:  AMBIEN  TAKE ONE TABLET BY MOUTH EACH EVENING AT BEDTIME AS NEEDED FOR INSOMNIA                  Medical Decision Making    Medical Decision Making:   Clinical Tests:   Lab Tests: Ordered and Reviewed  Radiological Study: Ordered and Reviewed  Medical Tests: Ordered and Reviewed           Scribe Attestation:   Scribe #1: I performed the above scribed service and the documentation accurately describes the services I performed. I attest to the accuracy of the note 03/27/2019.    Attending:   Physician Attestation Statement for Scribe #1: I, David Adamson MD, personally performed the services described in this documentation, as scribed by Corinne Mack, in my presence, and it is both accurate and complete.          Clinical Impression       ICD-10-CM ICD-9-CM   1. Pneumonia of right lower lobe due to infectious organism J18.1 486   2. Chest pain R07.9 786.50   3. SOB (shortness of breath) R06.02 786.05       Disposition:   Disposition: Placed in Observation  Condition: Fair           David Adamson MD  03/27/19 5701

## 2019-03-27 NOTE — PROGRESS NOTES
Clinical Pharmacy Consult Note - Vancomycin Dosing    Pharmacy consulted to dose Vancomycin by Dr. David Adamson  50 y/o male with PMH of ESRD on dialysis every Monday, Wednesday, and Friday, HTN, Atrial fibrillation, s/p mechanical AVR, CHF, Hep B, and HLD.      Indication: Pneumonia of right lower lobe due to infectious organism  Goal trough: 15-20 mcg/mL     Concomitant abx tx: Zosyn x 1 dose given in ED    Estimated Creatinine Clearance: 11.2 mL/min (A) (based on SCr of 9.4 mg/dL (H)). Patient is on dialysis every Monday, Wednesday, and Friday.      Lab Results   Component Value Date    WBC 6.45 03/27/2019       Tmax/24h 98.5 °F (36.9 °C)     Cultures: Blood x 2 collected 3/27/19 - results pending.      Actual BW = 98.1 kg --> use for dosing   IBW = 82.2 kg   Adj wt = 88.6 kg      Plan:   - Pt received a Vancomycin 2000 mg loading dose in ER & will be continued on pulse dosing due to renal function (ESRD on dialysis). Vancomycin place lynn order to remain in [facility].   - Vancomycin random level due on 3/28/19 with AM labs. Nephrology has been consulted. Awaiting nephrology notes to see if patient will continue HD every MWF, or if dialysis schedule will change in the hospital.   - Plan to re-dose when pre-HD random level is < 25 mcg/mL. Vancomycin doses to be given after HD will be determined by the specific random level per protocol.    - Pharmacy will continue to follow patient?s clinical status, renal function, C&S, and adjust dose as necessary to maintain trough levels between 15 and 20 mcg/mL.      Thank you for allowing us to participate in this patient's care.      Gayatri Tanner, PharmAMARIS 03/27/2019 2:39 PM

## 2019-03-27 NOTE — PLAN OF CARE
"Problem: Adult Inpatient Plan of Care  Goal: Plan of Care Review  Outcome: Ongoing (interventions implemented as appropriate)  Pt AAO x4.  VSS.  Pt remained afebrile throughout this shift.   All meds administered per order.   Pt remained free of falls this shift.   Pt had no c/o pain this shift.  Plan of care reviewed. Patient verbalizes understanding.   Pt moving/turning independently.   Patient NS on monitor.   Bed low, side rails up x 2, wheels locked, call light in reach.   Patient instructed to call for assistance.   Hourly rounding completed.   Will continue to monitor.    BP (!) 177/91 (BP Location: Right arm, Patient Position: Lying)   Pulse 77   Temp 98.5 °F (36.9 °C) (Oral)   Resp 18   Ht 6' 2" (1.88 m)   Wt 98.1 kg (216 lb 4.3 oz)   SpO2 96%   BMI 27.77 kg/m²         "

## 2019-03-27 NOTE — HPI
Isidro White Jr. is a 49 y.o. male pt with PMHx of afib, cardiomyopathy, CHF, ESRD on dialysis M/W/F, hepatitis b, and HTN who presents to the Emergency Department for right side CP which onset gradually 2-3 weeks ago with worsening today while having dialysis.  Patient was sent over from dialysis center by Dr Hurst for further evaluation of chest pain.  Associated sxs include SOB (for 3-4 weeks) and sleep apnea. Patient denies any fever, chills, diaphoresis, N/V, abd pain, back pain, neck pain, HA, dizziness, and all other sxs at this time. No prior Tx reported. ED work-up revealed H/H 8.7/25.8, platelets 118, , troponin 0.067, BNP 1355, BUN 40, Creatinine 9.4, CXR suggest pneumonia in the midportion of the right lung and right  tiny pleural effusion. Patient admitted to observation for pneumonia and chest pain rule out under the care of hospital medicine.

## 2019-03-27 NOTE — SUBJECTIVE & OBJECTIVE
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     HD M, W, F    ESRD on dialysis     Hepatitis B     Hyperlipidemia     Hypertension     Secondary hyperparathyroidism of renal origin     Supraventricular tachycardia     atrial tachycardia    Tachycardia     Valvular regurgitation     AI, TR       Past Surgical History:   Procedure Laterality Date    ABLATION N/A 3/13/2017    Performed by Nigel Guallpa MD at [facility] CATH LAB    ASD REPAIR      age 8 [facility]    AV Graft Creation Left 03/2017    CARD[facility]AC [facility]HETERIZATION      [facility]     CARDIAC VALVE SURGERY  02/08/2017    22 mm Medtronic AV, 28 mm TV Medtronic annuloplaty ring    [id number] Accuseal Left 3/16/2017    Performed by Alcides Manning MD at [facility] OR 2ND FLR    RADIOFREQUENCY ABLATION  03/13/2017    Atrial tachycardia    REDO STERNOTOMY WITH AORTIC VALVE REPLACEMENT/redo sternotomy N/A 2/8/2017    Performed by Jose Gastelum MD at [facility] OR 2ND FLR    REPAIR-VALVE-TRICUSPID  2/8/2017    Performed by Jose [facility]elum MD at [facility] OR 2ND FLR    REPLACEMENT-VALVE-AORTIC N/A 2/8/2017    Performed by Jose [facility]elum MD at [facility] OR [facility] F[facility]       Review of patient's allergies indicates:  No Known Allergies    No current facility-administered medications on file prior to encounter.      Current Outpatient Medications on File Prior to Encounter   Medication Sig    amLODIPine (NORVASC) 10 MG tablet Take 1 tablet (10 mg total) by mouth once daily.    ascorbic acid, vitamin C, (VITAMIN C) 500 MG tablet Take 1 tablet (500 mg total) by mouth 2 (two) times daily.    b complex vitamins tablet Take 1 tablet by mouth once daily.    calcium acetate (PHOSLO) 667 mg capsule Take 1 capsule (667 mg total) by mouth 3  (three) times daily.    diltiaZEM (CARDIZEM CD) 360 MG 24 hr capsule Take 1 capsule (360 mg total) by mouth once daily.    furosemide (LASIX) 80 MG tablet TAKE ONE TABLET BY MOUTH EVERY DAY    hydrOXYzine HCl (ATARAX) 25 MG tablet Take 1 tablet (25 mg total) by mouth 3 (three) times daily as needed for Itching.    labetalol (NORMODYNE) 100 MG tablet TAKE ONE TABLET BY MOUTH THREE TIMES DAILY AS NEEDED IF HEART RATE GREATER THAN 120    lisinopril (PRINIVIL,ZESTRIL) 40 MG tablet TAKE ONE TABLET BY MOUTH EVERY DAY    omega-3 fatty acids-vitamin E (FISH OIL) 1,000 mg Cap Take 1 capsule by mouth once daily.    pantoprazole (PROTONIX) 40 MG tablet TAKE ONE TABLET BY MOUTH EVERY DAY    ETHAN-RACQUEL RX 1- mg-mg-mcg Tab TAKE ONE TABLET BY MOUTH EVERY DAY    warfarin (COUMADIN) 7.5 MG tablet Take 1 tablet (7.5 mg total) by mouth Daily.    epoetin vikki (PROCRIT) 10,000 unit/mL injection Inject 1 mL (10,000 Units total) into the skin every Mon, Wed, Fri. To be given with HD    multivitamin with minerals tablet Take 1 tablet by mouth once daily.    tenofovir (VIREAD) 300 mg Tab Take 1 tablet (300 mg total) by mouth once a week. AFTER DIALYSIS    zolpidem (AMBIEN) 5 MG Tab TAKE ONE TABLET BY MOUTH EACH EVENING AT BEDTIME AS NEEDED FOR INSOMNIA     Family History     Problem Relation (Age of Onset)    Anesthesia problems Paternal Uncle    Diabetes Paternal Aunt, Paternal Aunt    Heart attack Father    Heart failure Paternal Grandmother    Hypertension Paternal Aunt    Leukemia Mother, Paternal Aunt    No Known Problems Sister, Brother, Sister, Sister    Stroke Paternal Aunt    Suicide Paternal Uncle    Valvular heart disease Maternal Aunt        Tobacco Use    Smoking status: Never Smoker    Smokeless tobacco: Never Used   Substance and Sexual Activity    Alcohol use: No     Frequency: Never     Comment: quit in 2016; does have heavy drinking history; started drinking at age 13; was drinking a 12 pack over the  weekend and two 24 ounces of beer a day during the week along with a pint of hard alcohol    Drug use: No    Sexual activity: Not on file     Review of Systems   Constitutional: Negative.  Negative for activity change, appetite change, diaphoresis and fatigue.   HENT: Negative.    Eyes: Negative.    Respiratory: Positive for cough (nonproductive ) and shortness of breath (3-4 weeks ). Negative for chest tightness.    Cardiovascular: Positive for chest pain (right side ).   Gastrointestinal: Negative.  Negative for abdominal pain, diarrhea, nausea and vomiting.   Endocrine: Negative.    Genitourinary: Negative.         HD M/W/F    Musculoskeletal: Negative.    Skin: Negative.    Allergic/Immunologic: Negative.    Neurological: Negative.  Negative for dizziness, weakness and headaches.   Hematological: Negative.    Psychiatric/Behavioral: Negative.      Objective:     Vital Signs (Most Recent):  Temp: 98.5 °F (36.9 °C) (03/27/19 1216)  Pulse: 80 (03/27/19 1216)  Resp: 20 (03/27/19 1216)  BP: (!) 167/87 (03/27/19 1216)  SpO2: 98 % (03/27/19 1216) Vital Signs (24h Range):  Temp:  [97.7 °F (36.5 °C)-98.5 °F (36.9 °C)] 98.5 °F (36.9 °C)  Pulse:  [79-83] 80  Resp:  [20] 20  SpO2:  [94 %-98 %] 98 %  BP: (155-170)/(79-87) 167/87     Weight: 98.1 kg (216 lb 4.3 oz)  Body mass index is 27.77 kg/m².    Physical Exam   Constitutional: He is oriented to person, place, and time. He appears well-developed and well-nourished. Nasal cannula in place.   HENT:   Head: Normocephalic and atraumatic.   Eyes: Pupils are equal, round, and reactive to light. EOM are normal.   Neck: Normal range of motion. Neck supple.   Cardiovascular: Normal rate, regular rhythm, intact distal pulses and normal pulses.   Murmur heard.  Pulmonary/Chest: Effort normal and breath sounds normal. No accessory muscle usage. No tachypnea. No respiratory distress.   Abdominal: Soft. Normal appearance and bowel sounds are normal. There is no tenderness.    Musculoskeletal: Normal range of motion. He exhibits edema (trace to BLEs ).   Neurological: He is alert and oriented to person, place, and time. He has normal reflexes.   Skin: Skin is warm, dry and intact. Capillary refill takes less than 2 seconds.   Psychiatric: He has a normal mood and affect. His speech is normal and behavior is normal. Judgment and thought content normal. Cognition and memory are normal.   Nursing note and vitals reviewed.       Significant Labs:   Blood Culture: No results for input(s): LABBLOO in the last 48 hours.  BMP:   Recent Labs   Lab 03/27/19  1055   GLU 92      K 3.8   CL 98   CO2 26   BUN 40*   CREATININE 9.4*   CALCIUM 9.8     CBC:   Recent Labs   Lab 03/27/19  1055   WBC 6.45   HGB 8.7*   HCT 25.8*   *     CMP:   Recent Labs   Lab 03/27/19  1055      K 3.8   CL 98   CO2 26   GLU 92   BUN 40*   CREATININE 9.4*   CALCIUM 9.8   PROT 8.4   ALBUMIN 3.2*   BILITOT 0.8   ALKPHOS 80   AST 32   ALT 50*   ANIONGAP 14   EGFRNONAA 6*     Lactic Acid:   Recent Labs   Lab 03/27/19  1215   LACTATE 1.1     All pertinent labs within the past 24 hours have been reviewed.    Significant Imaging:   Imaging Results          X-Ray Chest AP Portable (Final result)  Result time 03/27/19 10:51:28    Final result by MERVAT Will Sr., MD (03/27/19 10:51:28)                 Impression:      1. There has been interval development of a mild amount of haziness in the midportion of the right lung.  This is consistent with the patient's history and characteristic of pneumonia.  2. The right costophrenic angle is blunted.  This is characteristic of a tiny pleural effusion.  3. There are persistent streaky opacities in the lower half of the left lung.  This is characteristic of scarring.  4. The size of the heart is prominent.  This may be secondary to magnification.  5. Surgical changes  .      Electronically signed by: James Will MD  Date:    03/27/2019  Time:    10:51              Narrative:    EXAMINATION:  XR CHEST AP PORTABLE    CLINICAL HISTORY:  chest pain;    COMPARISON:  02/26/2019    FINDINGS:  There are multiple sternotomy wires in place.  The size of the heart is prominent.  There has been interval development of a mild amount of haziness in the midportion of the right lung.  There are persistent streaky opacities in the lower half of the left lung.  There is no pneumothorax.  The left costophrenic angle is sharp.  The right costophrenic angle is blunted.

## 2019-03-27 NOTE — H&P
[facility] Hospital Medicine  History & Physical    Patient Name: Isidro White Jr.  MRN: [id number]  Admission Date: 3/27/2019  Attending Physician: Braxton De La Cruz MD   Primary Care Provider: Provider [employer]         Patient information was obtained from patient and ER records.     Subjective:     Principal Problem:Chest pain    Chief Complaint:   Chief Complaint   Patient presents with    Chest Pain     R side CP x 2 weeks. told to come in by Larissa. +SOB. +dialysis        HPI: Isidro White Jr. is a 49 y.o. male pt with  PMHx of afib, cardiomyopathy, CHF, ESRD on dialysis M/W/F, hepatitis b, and HTN who presents to the Emergency Department for right side CP which onset gradually 2-3 weeks ago with worsening today while having dialysis.  Patient was sent over from dialysis center by Dr Hurst for further evaluation of chest pain.  Associated sxs include SOB (for 3-4 weeks) and sleep apnea. Patient denies any fever, chills, diaphoresis, N/V, abd pain, back pain, neck pain, HA, dizziness, and all other sxs at this time. No prior Tx reported.  ED work-up revealed H/H 8.7/25.8, platelets 118, , troponin 0.067, BNP 1355, BUN 40, Creatinine 9.4, CXR suggest pneumonia in the midportion of the right lung and right  tiny pleural effusion. Patient admitted to observation for pneumonia and chest pain rule out under the care of hospital medicine.         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     HD M, W, F    ESRD on dialysis     Hepatitis B     Hyperlipidemia     Hypertension     Secondary hyperparathyroidism of renal origin     Supraventricular tachycardia     atrial tachycardia    Tachycardia     Valvular regurgitation     AI, TR       Past Surgical History:    Procedure Laterality Date    ABLATION N/A 3/13/2017    Performed by Nigel Guallpa MD at [facility] CATH LAB    ASD REPAIR      age 8 [facility]    AV Graft Creation Left 03/2017    C[facility]AC [facility]IZATION      [facility]     CARDIAC VALVE SURGERY  02/08/2017    22 mm Medtronic AV, 28 mm TV Medtronic annuloplaty ring    [id number] Accuseal Left 3/16/2017    Performed by Alcides Manning MD at [facility] OR 2ND FLR    RADIOFREQUENCY ABLATION  03/13/2017    Atrial tachycardia    REDO STERNOTOMY WITH AORTIC VALVE REPLACEMENT/redo sternotomy N/A 2/8/2017    Performed by Jose Gastelum MD at [facility] OR 2ND FLR    REPAIR-VALVE-TRICUSPID  2/8/2017    Performed by Jose Gastelum MD at [facility] OR 2ND FLR    REPLACEMENT-VALVE-AORTIC N/A 2/8/2017    Performed by Jose Gastelum MD at [facility] OR 2ND FLR       Review of patient's allergies indicates:  No Known Allergies    No current facility-administered medications on file prior to encounter.      Current Outpatient Medications on File Prior to Encounter   Medication Sig    amLODIPine (NORVASC) 10 MG tablet Take 1 tablet (10 mg total) by mouth once daily.    ascorbic acid, vitamin C, (VITAMIN C) 500 MG tablet Take 1 tablet (500 mg total) by mouth 2 (two) times daily.    b complex vitamins tablet Take 1 tablet by mouth once daily.    calcium acetate (PHOSLO) 667 mg capsule Take 1 capsule (667 mg total) by mouth 3 (three) times daily.    diltiaZEM (CARDIZEM CD) 360 MG 24 hr capsule Take 1 capsule (360 mg total) by mouth once daily.    furosemide (LASIX) 80 MG tablet TAKE ONE TABLET BY MOUTH EVERY DAY    hydrOXYzine HCl (ATARAX) 25 MG tablet Take 1 tablet (25 mg total) by mouth 3 (three) times daily as needed for Itching.    labetalol (NORMODYNE) 100 MG tablet TAKE ONE TABLET BY MOUTH THREE TIMES DAILY AS NEEDED IF HEART RATE GREATER THAN 120    lisinopril (PRINIVIL,ZESTRIL) 40 MG tablet TAKE ONE TABLET BY MOUTH EVERY DAY    omega-3 fatty  acids-vitamin E (FISH OIL) 1,000 mg Cap Take 1 capsule by mouth once daily.    pantoprazole (PROTONIX) 40 MG tablet TAKE ONE TABLET BY MOUTH EVERY DAY    ETHAN-RACQUEL RX 1- mg-mg-mcg Tab TAKE ONE TABLET BY MOUTH EVERY DAY    warfarin (COUMADIN) 7.5 MG tablet Take 1 tablet (7.5 mg total) by mouth Daily.    epoetin vikki (PROCRIT) 10,000 unit/mL injection Inject 1 mL (10,000 Units total) into the skin every Mon, Wed, Fri. To be given with HD    multivitamin with minerals tablet Take 1 tablet by mouth once daily.    tenofovir (VIREAD) 300 mg Tab Take 1 tablet (300 mg total) by mouth once a week. AFTER DIALYSIS    zolpidem (AMBIEN) 5 MG Tab TAKE ONE TABLET BY MOUTH EACH EVENING AT BEDTIME AS NEEDED FOR INSOMNIA     Family History     Problem Relation (Age of Onset)    Anesthesia problems Paternal Uncle    Diabetes Paternal Aunt, Paternal Aunt    Heart attack Father    Heart failure Paternal Grandmother    Hypertension Paternal Aunt    Leukemia Mother, Paternal Aunt    No Known Problems Sister, Brother, Sister, Sister    Stroke Paternal Aunt    Suicide Paternal Uncle    Valvular heart disease Maternal Aunt        Tobacco Use    Smoking status: Never Smoker    Smokeless tobacco: Never Used   Substance and Sexual Activity    Alcohol use: No     Frequency: Never     Comment: quit in 2016; does have heavy drinking history; started drinking at age 13; was drinking a 12 pack over the weekend and two 24 ounces of beer a day during the week along with a pint of hard alcohol    Drug use: No    Sexual activity: Not on file     Review of Systems   Constitutional: Negative.  Negative for activity change, appetite change, diaphoresis and fatigue.   HENT: Negative.    Eyes: Negative.    Respiratory: Positive for cough (nonproductive ) and shortness of breath (3-4 weeks ). Negative for chest tightness.    Cardiovascular: Positive for chest pain (right side ).   Gastrointestinal: Negative.  Negative for abdominal pain,  diarrhea, nausea and vomiting.   Endocrine: Negative.    Genitourinary: Negative.         HD M/W/F    Musculoskeletal: Negative.    Skin: Negative.    Allergic/Immunologic: Negative.    Neurological: Negative.  Negative for dizziness, weakness and headaches.   Hematological: Negative.    Psychiatric/Behavioral: Negative.      Objective:     Vital Signs (Most Recent):  Temp: 98.5 °F (36.9 °C) (03/27/19 1216)  Pulse: 80 (03/27/19 1216)  Resp: 20 (03/27/19 1216)  BP: (!) 167/87 (03/27/19 1216)  SpO2: 98 % (03/27/19 1216) Vital Signs (24h Range):  Temp:  [97.7 °F (36.5 °C)-98.5 °F (36.9 °C)] 98.5 °F (36.9 °C)  Pulse:  [79-83] 80  Resp:  [20] 20  SpO2:  [94 %-98 %] 98 %  BP: (155-170)/(79-87) 167/87     Weight: 98.1 kg (216 lb 4.3 oz)  Body mass index is 27.77 kg/m².    Physical Exam   Constitutional: He is oriented to person, place, and time. He appears well-developed and well-nourished. Nasal cannula in place.   HENT:   Head: Normocephalic and atraumatic.   Eyes: Pupils are equal, round, and reactive to light. EOM are normal.   Neck: Normal range of motion. Neck supple.   Cardiovascular: Normal rate, regular rhythm, intact distal pulses and normal pulses.   Murmur heard.  Pulmonary/Chest: Effort normal and breath sounds normal. No accessory muscle usage. No tachypnea. No respiratory distress.   Abdominal: Soft. Normal appearance and bowel sounds are normal. There is no tenderness.   Musculoskeletal: Normal range of motion. He exhibits edema (trace to BLEs ).   Neurological: He is alert and oriented to person, place, and time. He has normal reflexes.   Skin: Skin is warm, dry and intact. Capillary refill takes less than 2 seconds.   Psychiatric: He has a normal mood and affect. His speech is normal and behavior is normal. Judgment and thought content normal. Cognition and memory are normal.   Nursing note and vitals reviewed.       Significant Labs:   Blood Culture: No results for input(s): LABBLOO in the last 48  hours.  BMP:   Recent Labs   Lab 03/27/19  1055   GLU 92      K 3.8   CL 98   CO2 26   BUN 40*   CREATININE 9.4*   CALCIUM 9.8     CBC:   Recent Labs   Lab 03/27/19  1055   WBC 6.45   HGB 8.7*   HCT 25.8*   *     CMP:   Recent Labs   Lab 03/27/19  1055      K 3.8   CL 98   CO2 26   GLU 92   BUN 40*   CREATININE 9.4*   CALCIUM 9.8   PROT 8.4   ALBUMIN 3.2*   BILITOT 0.8   ALKPHOS 80   AST 32   ALT 50*   ANIONGAP 14   EGFRNONAA 6*     Lactic Acid:   Recent Labs   Lab 03/27/19  1215   LACTATE 1.1     All pertinent labs within the past 24 hours have been reviewed.    Significant Imaging:   Imaging Results          X-Ray Chest AP Portable (Final result)  Result time 03/27/19 10:51:28    Final result by MERVAT Will Sr., MD (03/27/19 10:51:28)                 Impression:      1. There has been interval development of a mild amount of haziness in the midportion of the right lung.  This is consistent with the patient's history and characteristic of pneumonia.  2. The right costophrenic angle is blunted.  This is characteristic of a tiny pleural effusion.  3. There are persistent streaky opacities in the lower half of the left lung.  This is characteristic of scarring.  4. The size of the heart is prominent.  This may be secondary to magnification.  5. Surgical changes  .      Electronically signed by: James Will MD  Date:    03/27/2019  Time:    10:51             Narrative:    EXAMINATION:  XR CHEST AP PORTABLE    CLINICAL HISTORY:  chest pain;    COMPARISON:  02/26/2019    FINDINGS:  There are multiple sternotomy wires in place.  The size of the heart is prominent.  There has been interval development of a mild amount of haziness in the midportion of the right lung.  There are persistent streaky opacities in the lower half of the left lung.  There is no pneumothorax.  The left costophrenic angle is sharp.  The right costophrenic angle is blunted.                              Assessment/Plan:      * Chest pain  Place on observation   Serial Troponin every 6 hours x 2  2D echo showed normal EF 60%, DD, pulmonary HTN, Mild mitral regurgitation, and Trivial tricuspid regurgitation on 1/17/19  Oxygen Therapy to maintain o2 sats greater than 92%  Resume coumadin   Nitrates SL PRN   Morphine PRN for chest pain   Possible cardiology consult            Pneumonia  -Chest x-ray reveals pna to midportion of the right lung.  - Continue IV vancomycin and zosyn empirically.  - Continue supplemental oxygen to maintain saturations greater than 92%.  - Continue bronchodilators as needed.       - Blood cultures       -Sputum cx          Atrial fibrillation  Resume coumadin   Heart rate controlled       Chronic diastolic heart failure  Appears compensated   Resume home lasix   Fluid restriction/low salt diet  Daily weights   Strict I&Os       Anemia of chronic disease  H/H 8.7/25.8  No active bleeding   CBC daily      Pleural effusion  Tiny pleural effusion on cxr   Will continue po lasix   Monitor           ESRD on dialysis  Dialysis M/W/F  Nephrology consult to assist with management       S/P AVR (aortic valve replacement)  Cont coumadin  Followed [facility]madin clinic at [facility]      Essential hypertension  Resume amlodipine, lisinopril, and lasix   Monitor       VTE Risk Mitigation (From admission, onward)        Ordered     IP VTE HIGH RISK PATIENT  Once      03/27/19 1450     Place sequential compression device  Until discontinued      03/27/19 1450             Roro Zurita NP  Department of Hospital Medicine   [facility] -

## 2019-03-27 NOTE — ASSESSMENT & PLAN NOTE
-Chest x-ray reveals pna to midportion of the right lung.  - Continue IV vancomycin and zosyn empirically.  - Continue supplemental oxygen to maintain saturations greater than 92%.  - Continue bronchodilators as needed.       - Blood cultures       -Sputum cx

## 2019-03-27 NOTE — ASSESSMENT & PLAN NOTE
Appears compensated   Resume home lasix   Fluid restriction/low salt diet  Daily weights   Strict I&Os

## 2019-03-27 NOTE — ASSESSMENT & PLAN NOTE
Place on observation   Serial Troponin every 6 hours x 2  2D echo showed normal EF 60%, DD, pulmonary HTN, Mild mitral regurgitation, and Trivial tricuspid regurgitation on 1/17/19  Oxygen Therapy to maintain o2 sats greater than 92%  Resume coumadin   Nitrates SL PRN   Morphine PRN for chest pain   Possible cardiology consult

## 2019-03-28 VITALS
HEIGHT: 74 IN | OXYGEN SATURATION: 93 % | HEART RATE: 75 BPM | RESPIRATION RATE: 20 BRPM | TEMPERATURE: 98 F | WEIGHT: 216.25 LBS | DIASTOLIC BLOOD PRESSURE: 75 MMHG | BODY MASS INDEX: 27.75 KG/M2 | SYSTOLIC BLOOD PRESSURE: 155 MMHG

## 2019-03-28 PROBLEM — R07.9 CHEST PAIN: Status: RESOLVED | Noted: 2019-03-27 | Resolved: 2019-03-28

## 2019-03-28 LAB
ANION GAP SERPL CALC-SCNC: 11 MMOL/L (ref 8–16)
BACTERIA SPEC AEROBE CULT: NORMAL
BASOPHILS # BLD AUTO: 0.01 K/UL (ref 0–0.2)
BASOPHILS NFR BLD: 0.1 % (ref 0–1.9)
BUN SERPL-MCNC: 51 MG/DL (ref 6–20)
CALCIUM SERPL-MCNC: 9.6 MG/DL (ref 8.7–10.5)
CHLORIDE SERPL-SCNC: 97 MMOL/L (ref 95–110)
CO2 SERPL-SCNC: 29 MMOL/L (ref 23–29)
CREAT SERPL-MCNC: 11.9 MG/DL (ref 0.5–1.4)
DIFFERENTIAL METHOD: ABNORMAL
EOSINOPHIL # BLD AUTO: 1 K/UL (ref 0–0.5)
EOSINOPHIL NFR BLD: 12 % (ref 0–8)
ERYTHROCYTE [DISTWIDTH] IN BLOOD BY AUTOMATED COUNT: 14.3 % (ref 11.5–14.5)
EST. GFR  (AFRICAN AMERICAN): 5 ML/MIN/1.73 M^2
EST. GFR  (NON AFRICAN AMERICAN): 4 ML/MIN/1.73 M^2
GLUCOSE SERPL-MCNC: 81 MG/DL (ref 70–110)
GRAM STN SPEC: NORMAL
GRAM STN SPEC: NORMAL
HCT VFR BLD AUTO: 26.3 % (ref 40–54)
HGB BLD-MCNC: 8.7 G/DL (ref 14–18)
INR PPP: 1.5 (ref 0.8–1.2)
LYMPHOCYTES # BLD AUTO: 1.1 K/UL (ref 1–4.8)
LYMPHOCYTES NFR BLD: 13.4 % (ref 18–48)
MCH RBC QN AUTO: 35.7 PG (ref 27–31)
MCHC RBC AUTO-ENTMCNC: 33.1 G/DL (ref 32–36)
MCV RBC AUTO: 108 FL (ref 82–98)
MONOCYTES # BLD AUTO: 0.9 K/UL (ref 0.3–1)
MONOCYTES NFR BLD: 10.6 % (ref 4–15)
NEUTROPHILS # BLD AUTO: 5.2 K/UL (ref 1.8–7.7)
NEUTROPHILS NFR BLD: 64 % (ref 38–73)
PLATELET # BLD AUTO: 120 K/UL (ref 150–350)
PMV BLD AUTO: 10.5 FL (ref 9.2–12.9)
POTASSIUM SERPL-SCNC: 4 MMOL/L (ref 3.5–5.1)
PROTHROMBIN TIME: 15.7 SEC (ref 9–12.5)
RBC # BLD AUTO: 2.44 M/UL (ref 4.6–6.2)
SODIUM SERPL-SCNC: 137 MMOL/L (ref 136–145)
VANCOMYCIN SERPL-MCNC: 31.1 UG/ML
WBC # BLD AUTO: 8.14 K/UL (ref 3.9–12.7)

## 2019-03-28 PROCEDURE — 27000221 HC OXYGEN, UP TO 24 HOURS: Mod: NTX

## 2019-03-28 PROCEDURE — 99214 OFFICE O/P EST MOD 30 MIN: CPT | Mod: NTX,,, | Performed by: INTERNAL MEDICINE

## 2019-03-28 PROCEDURE — 25000242 PHARM REV CODE 250 ALT 637 W/ HCPCS: Mod: NTX | Performed by: NURSE PRACTITIONER

## 2019-03-28 PROCEDURE — 87205 SMEAR GRAM STAIN: CPT | Mod: NTX

## 2019-03-28 PROCEDURE — 80048 BASIC METABOLIC PNL TOTAL CA: CPT | Mod: NTX

## 2019-03-28 PROCEDURE — 85025 COMPLETE CBC W/AUTO DIFF WBC: CPT | Mod: NTX

## 2019-03-28 PROCEDURE — 90471 IMMUNIZATION ADMIN: CPT | Mod: NTX | Performed by: INTERNAL MEDICINE

## 2019-03-28 PROCEDURE — 85610 PROTHROMBIN TIME: CPT | Mod: NTX

## 2019-03-28 PROCEDURE — 90670 PCV13 VACCINE IM: CPT | Mod: NTX | Performed by: INTERNAL MEDICINE

## 2019-03-28 PROCEDURE — 96376 TX/PRO/DX INJ SAME DRUG ADON: CPT | Mod: 59,NTX

## 2019-03-28 PROCEDURE — 25000003 PHARM REV CODE 250: Mod: NTX | Performed by: EMERGENCY MEDICINE

## 2019-03-28 PROCEDURE — 36415 COLL VENOUS BLD VENIPUNCTURE: CPT | Mod: NTX

## 2019-03-28 PROCEDURE — 25000003 PHARM REV CODE 250: Mod: NTX | Performed by: INTERNAL MEDICINE

## 2019-03-28 PROCEDURE — 25000003 PHARM REV CODE 250: Mod: NTX | Performed by: NURSE PRACTITIONER

## 2019-03-28 PROCEDURE — 80202 ASSAY OF VANCOMYCIN: CPT | Mod: NTX

## 2019-03-28 PROCEDURE — 87070 CULTURE OTHR SPECIMN AEROBIC: CPT | Mod: NTX

## 2019-03-28 PROCEDURE — 63600175 PHARM REV CODE 636 W HCPCS: Mod: NTX | Performed by: INTERNAL MEDICINE

## 2019-03-28 PROCEDURE — G0378 HOSPITAL OBSERVATION PER HR: HCPCS | Mod: NTX

## 2019-03-28 PROCEDURE — 94640 AIRWAY INHALATION TREATMENT: CPT | Mod: NTX

## 2019-03-28 PROCEDURE — 97803 MED NUTRITION INDIV SUBSEQ: CPT | Mod: NTX

## 2019-03-28 PROCEDURE — 99214 PR OFFICE/OUTPT VISIT, EST, LEVL IV, 30-39 MIN: ICD-10-PCS | Mod: NTX,,, | Performed by: INTERNAL MEDICINE

## 2019-03-28 PROCEDURE — 90686 IIV4 VACC NO PRSV 0.5 ML IM: CPT | Mod: NTX | Performed by: INTERNAL MEDICINE

## 2019-03-28 PROCEDURE — 90472 IMMUNIZATION ADMIN EACH ADD: CPT | Mod: NTX | Performed by: INTERNAL MEDICINE

## 2019-03-28 RX ORDER — AMOXICILLIN AND CLAVULANATE POTASSIUM 500; 125 MG/1; MG/1
1 TABLET, FILM COATED ORAL DAILY
Qty: 10 TABLET | Refills: 0 | Status: SHIPPED | OUTPATIENT
Start: 2019-03-28 | End: 2019-04-07

## 2019-03-28 RX ORDER — LORAZEPAM 0.5 MG/1
0.5 TABLET ORAL EVERY 12 HOURS PRN
Status: DISCONTINUED | OUTPATIENT
Start: 2019-03-28 | End: 2019-03-28 | Stop reason: HOSPADM

## 2019-03-28 RX ADMIN — Medication 2.25 G: at 11:03

## 2019-03-28 RX ADMIN — OXYCODONE HYDROCHLORIDE AND ACETAMINOPHEN 500 MG: 500 TABLET ORAL at 08:03

## 2019-03-28 RX ADMIN — LISINOPRIL 40 MG: 20 TABLET ORAL at 08:03

## 2019-03-28 RX ADMIN — PNEUMOCOCCAL 13-VALENT CONJUGATE VACCINE 0.5 ML: 2.2; 2.2; 2.2; 2.2; 2.2; 4.4; 2.2; 2.2; 2.2; 2.2; 2.2; 2.2; 2.2 INJECTION, SUSPENSION INTRAMUSCULAR at 12:03

## 2019-03-28 RX ADMIN — Medication 2.25 G: at 05:03

## 2019-03-28 RX ADMIN — LORAZEPAM 0.5 MG: 0.5 TABLET ORAL at 01:03

## 2019-03-28 RX ADMIN — DILTIAZEM HYDROCHLORIDE 360 MG: 180 CAPSULE, COATED, EXTENDED RELEASE ORAL at 08:03

## 2019-03-28 RX ADMIN — PANTOPRAZOLE SODIUM 40 MG: 40 TABLET, DELAYED RELEASE ORAL at 08:03

## 2019-03-28 RX ADMIN — IPRATROPIUM BROMIDE AND ALBUTEROL SULFATE 3 ML: .5; 3 SOLUTION RESPIRATORY (INHALATION) at 12:03

## 2019-03-28 RX ADMIN — AMLODIPINE BESYLATE 10 MG: 10 TABLET ORAL at 08:03

## 2019-03-28 RX ADMIN — HYDRALAZINE HYDROCHLORIDE 25 MG: 25 TABLET, FILM COATED ORAL at 05:03

## 2019-03-28 RX ADMIN — CALCIUM ACETATE 667 MG: 667 CAPSULE ORAL at 08:03

## 2019-03-28 RX ADMIN — INFLUENZA A VIRUS A/MICHIGAN/45/2015 X-275 (H1N1) ANTIGEN (FORMALDEHYDE INACTIVATED), INFLUENZA A VIRUS A/SINGAPORE/INFIMH-16-0019/2016 IVR-186 (H3N2) ANTIGEN (FORMALDEHYDE INACTIVATED), INFLUENZA B VIRUS B/PHUKET/3073/2013 ANTIGEN (FORMALDEHYDE INACTIVATED), AND INFLUENZA B VIRUS B/MARYLAND/15/2016 BX-69A ANTIGEN (FORMALDEHYDE INACTIVATED) 0.5 ML: 15; 15; 15; 15 INJECTION, SUSPENSION INTRAMUSCULAR at 12:03

## 2019-03-28 RX ADMIN — IPRATROPIUM BROMIDE AND ALBUTEROL SULFATE 3 ML: .5; 3 SOLUTION RESPIRATORY (INHALATION) at 07:03

## 2019-03-28 RX ADMIN — NEPHROCAP 1 CAPSULE: 1 CAP ORAL at 08:03

## 2019-03-28 NOTE — HOSPITAL COURSE
49 year old male admitted for pneumonia and chest pain. ED work-up revealed H/H 8.7/25.8, platelets 118, , troponin 0.067, BNP 1355, BUN 40, Creatinine 9.4, CXR suggest pneumonia in the midportion of the right lung and right  tiny pleural effusion. Patient started on IV vancomycin and zosyn empirically. Duoneb treatments and supplemental oxygen. 3/28/19- Serial troponin's flat. Denies any chest pain or SOB. Symptoms improved. Labs reviewed and stable. Case discussed with rosa elena Nicholson to discharge from nephrology standpoint. Resume scheduled HD. Patient ready for discharge. Home meds reconciled. New prescription given for Augmentin. Patient to follow-up with PCP in 3 days for hospital follow-up of pna and chest pain. Patient also to follow-up with nephrology and cardiology outpatient. Patient seen and examined on the date of discharge and found suitable for discharge.

## 2019-03-28 NOTE — HPI
Pt was seen and examined. H/o and chart reviewed. Pt is a 48 y/o male with ESRD, on chronic HD at [facility] MWF, HTN, hepatitis B (currently on active treatment), diastolic CHF, h/o of AVR 2017, who presented with SOB. Pt received about 2.5 hours of HD today. Per w/u performed, pt has right sided pneumonia. Pt has had sx's of SOB, specially on taking deep breaths, cough, and increased phlegm, slowly worse in the past 2 weeks. Treatment (vancolycin and zosyn) and labs reviewed. Pt was asked about his candidacy for kidney transplant. Records reviewed. Pt is undergoing screening process. He was found to be high risk, however, he was not ruled out, pending completion of cardiac w/u and hepatitis B treatment. See Dr. Lassiter's note from 1/9/19.

## 2019-03-28 NOTE — CONSULTS
Clinical Pharmacy: Coumadin Consult Note    Patient's Coumadin will be dosed and monitored by Pharmacy at the request of Roro Zurita NP     Indication: Atrial fibrillation, AVR  Goal INR: 2.0-3.0  Today's INR: 1.4 (sub-therapeutic)     Patient will be started on current home dose of Coumadin 7.5mg daily. Patient does not come to [facility] for anti-coag visits. Per medmined, patient in non-compliant with filling Coumadin.   PT/INR will be monitored daily. Dose adjustments will be made accordingly.    Patient needs to be counseled.     Thank you for allowing us to participate in this patient's care.   Devora Antony 3/27/2019 8:16 PM

## 2019-03-28 NOTE — CONSULTS
[facility] -   Nephrology  Consult Note      Patient Name: Isidro White Jr.  MRN: [id number]  Admission Date: 3/27/2019  Hospital Length of Stay: 0 days  Attending Provider: Braxton De La Cruz MD   Primary Care Physician: Provider Notinsystem  Principal Problem:Chest pain     Reason for consult: ESRD  Referring physician: Dr. De La Cruz    Consults  Subjective:     HPI: Pt was seen and examined. H/o and chart reviewed. Pt is a 50 y/o male with ESRD, on chronic HD at [facility] MWF, HTN, hepatitis B (currently on active treatment), diastolic CHF, h/o of AVR 2017, who presented with SOB. Pt received about 2.5 hours of HD today. Per w/u performed, pt has right sided pneumonia. Pt has had sx's of SOB, specially on taking deep breaths, cough, and increased phlegm, slowly worse in the past 2 weeks. Treatment (vancolycin and zosyn) and labs reviewed. Pt was asked about his candidacy for kidney transplant. Records reviewed. Pt is undergoing screening process. He was found to be high risk, however, he was not ruled out, pending completion of cardiac w/u and hepatitis B treatment. See Dr. Lassiter's note from 1/9/19.    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     HD M, W, F    ESRD on dialysis     Hepatitis B     Hyperlipidemia     Hypertension     Secondary hyperparathyroidism of renal origin     Supraventricular tachycardia     atrial tachycardia    Tachycardia     Valvular regurgitation     AI, TR       Past Surgical History:   Procedure Laterality Date    ABLATION N/A 3/13/2017    Performed by Nigel Guallpa [facility] at [facility] CATH LAB    ASD REPAIR      age 8 [facility]    AV Graft Creation Left 03/2017    CARDIAC CATHETERIZATION      [facility]     CARDIAC VALVE SURGERY   02/08/2017    22 mm Medtronic AV, 28 mm TV Medtronic annuloplaty ring    [id number] Accuseal Left 3/16/2017    [facility]    RADIOFREQUENCY ABLATION  03/13/2017    Atrial tachycardia    REDO STERNOTOMY WITH AORTIC VALVE REPLACEMENT/redo sternotomy N/A 2/8/2017    Performed by Jose Gastelum MD at [facility] OR [facility] FLR    REPAIR-VALVE-TRICUSPID  2/8/2017    Performed by Jose [facility]elum MD at [facility] OR [facility]    REPLACEMENT-VALVE-AORTIC N/A 2/8/2017    Performed by Jose Gastelum MD at [facility] OR [address] [place] [address]       Review of patient's allergies indicates:  No Known Allergies  Current Facility-Administered Medications   Medication Frequency    acetaminophen tablet 650 mg Q8H PRN    albuterol-ipratropium 2.5 mg-0.5 mg/3 mL nebulizer solution 3 mL Q6H    amLODIPine tablet 10 mg Daily    ascorbic acid (vitamin C) tablet 500 mg BID    calcium acetate capsule 667 mg TID    diltiaZEM 24 hr capsule 360 mg Daily    furosemide tablet 80 mg Daily    hydrOXYzine HCl tablet 25 mg TID PRN    lisinopril tablet 40 mg Daily    ondansetron injection 4 mg Q12H PRN    pantoprazole EC tablet 40 mg Daily    piperacillin-tazobactam 2.25 g in dextrose 5 % 50 mL IVPB (ready to mix system) Q8H    promethazine (PHENERGAN) 6.25 mg in dextrose 5 % 50 mL IVPB Q6H PRN    sodium chloride 0.9% flush 5 mL PRN    [START ON 4/5/2019] vancomycin 1.5 g (PLACE GARDUNO ONLY) in D5W 250 mL IVPB Q72H    vitamin renal formula (B-complex-vitamin c-folic acid) 1 mg per capsule 1 capsule Daily    warfarin tablet 7.5 mg Daily    zolpidem tablet 5 mg Nightly PRN     Family History     Problem Relation (Age of Onset)    Anesthesia problems Paternal Uncle    Diabetes Paternal Aunt, Paternal Aunt    Heart attack Father    Heart failure Paternal Grandmother    Hypertension Paternal Aunt    Leukemia Mother, Paternal Aunt    No Known Problems Sister, Brother, Sister, Sister    Stroke  Paternal Aunt    Suicide Paternal Uncle    Valvular heart disease Maternal Aunt        Tobacco Use    Smoking status: Never Smoker    Smokeless tobacco: Never Used   Substance and Sexual Activity    Alcohol use: No     Frequency: Never     Comment: quit in 2016; does have heavy drinking history; started drinking at age 13; was drinking a 12 pack over the weekend and two 24 ounces of beer a day during the week along with a pint of hard alcohol    Drug use: No    Sexual activity: Not on file     Review of Systems   HENT: Negative.    Respiratory: Positive for cough and shortness of breath.    Cardiovascular: Negative for leg swelling.   Genitourinary: Negative.    Musculoskeletal: Negative.    Neurological: Negative.    Psychiatric/Behavioral: Negative.      Objective:     Vital Signs (Most Recent):  Temp: 98.5 °F (36.9 °C) (03/27/19 1717)  Pulse: 77 (03/27/19 1717)  Resp: 18 (03/27/19 1717)  BP: (!) 177/91 (03/27/19 1717)  SpO2: 96 % (03/27/19 1717)  O2 Device (Oxygen Therapy): nasal cannula (03/27/19 1717) Vital Signs (24h Range):  Temp:  [97.7 °F (36.5 °C)-98.5 °F (36.9 °C)] 98.5 °F (36.9 °C)  Pulse:  [77-83] 77  Resp:  [18-20] 18  SpO2:  [94 %-98 %] 96 %  BP: (155-177)/(77-91) 177/91     Weight: 98.1 kg (216 lb 4.3 oz) (03/27/19 1023)  Body mass index is 27.77 kg/m².  Body surface area is 2.26 meters squared.    No intake/output data recorded.    Physical Exam   Constitutional: He is oriented to person, place, and time. He appears well-developed.   HENT:   Head: Normocephalic and atraumatic.   Neck: No JVD present.   Cardiovascular: Normal rate, regular rhythm and normal heart sounds. Exam reveals no friction rub.   Pulmonary/Chest: He is in respiratory distress. He has rales.   Abdominal: Soft. Bowel sounds are normal. He exhibits no distension and no mass.   Musculoskeletal: He exhibits no edema.   Neurological: He is alert and oriented to person, place, and time.   Skin: Skin is warm and dry.  "  Psychiatric: He has a normal mood and affect. His behavior is normal.   Nursing note and vitals reviewed.      Significant Labs: reviewed  BMP  Lab Results   Component Value Date     03/27/2019    K 3.8 03/27/2019    CL 98 03/27/2019    CO2 26 03/27/2019    BUN 40 (H) 03/27/2019    CREATININE 9.4 (H) 03/27/2019    CALCIUM 9.8 03/27/2019    ANIONGAP 14 03/27/2019    ESTGFRAFRICA 7 (A) 03/27/2019    EGFRNONAA 6 (A) 03/27/2019     Lab Results   Component Value Date    WBC 6.45 03/27/2019    HGB 8.7 (L) 03/27/2019    HCT 25.8 (L) 03/27/2019     (H) 03/27/2019     (L) 03/27/2019     Blood cx pending  Recent Labs   Lab 03/27/19  1729   TROPONINI 0.064*       Significant Imaging: CXR reviewed, has right sided infltrate    Assessment/Plan:   48 y/o male with ESRD on chronic HD presented with SOB:    ESRD on dialysis  ESRD. Chronic HD q MWF.  S/p HD today as outpt  K normal  O2 sat good  Acid base OK  No indications for further HD today.    Transplant w/u reviewed for the pt  He was advised of the following (per Dr. Lassiter's note from 1/9/19):  "After obtaining history and performing physical exam as well as reviewing available diagnostic studies, Mr. White is a high-risk kidney transplant candidate secondary to significant cardiac history as stated above as well as being on chronic anti-coagulation for mechanical aortic valve.  Final determination of transplant candidacy will be made once workup is complete and reviewed by the selection committee.  Prior to Listing, will need the following items to be completed:  1. Cardiac stress test and TTE along with cardiology clearance  2. Standard serologies and blood work  3. Abdominal US   4. Hepatology consultation and clearance in light of untreated Hepatitis B  5. Urology referral for hematuria "      He was informed that he can reach out to Isis, his ODOM at the outpt HD unit and to his transplant coordinator for assistance    Essential " hypertension  Uncontrolled BP.  Will re-start outpt BP meds and will monitor  Pt was seen previously by me in 2016  Pt has a h/o of uncontrolled BP, which is likely the cause of his renal failure, though hep B could be the cause as well.  No prior kidney biopsy    Pneumonia  Right sided pneumonia  Community acquired  On vancomycin and zosyn  Will get random vanc level in am  Adjust zosyn dose for the renal function      Plans and recommendations:  As discussed above  Will reevalaute for HD in am  Total time spent 70 minutes including time needed to review the records, the   patient evaluation, documentation, face-to-face discussion with the patient,   more than 50% of the time was spent on coordination of care and counseling.    Level V visit.      Sayda Hi MD   Nephrology  [facility]

## 2019-03-28 NOTE — ASSESSMENT & PLAN NOTE
Right sided pneumonia  Community acquired  On vancomycin and zosyn  Will get random vanc level in am  Adjust zosyn dose for the renal function

## 2019-03-28 NOTE — SUBJECTIVE & OBJECTIVE
"Interval History: Pt was seen and examined this am. No new c/o's, no new events, stable last pm. SOB "better", no cough, no fever, no CP    Review of patient's allergies indicates:  No Known Allergies  Current Facility-Administered Medications   Medication Frequency    acetaminophen tablet 650 mg Q8H PRN    albuterol-ipratropium 2.5 mg-0.5 mg/3 mL nebulizer solution 3 mL Q6H    amLODIPine tablet 10 mg Daily    ascorbic acid (vitamin C) tablet 500 mg BID    calcium acetate capsule 667 mg TID    diltiaZEM 24 hr capsule 360 mg Daily    hydrALAZINE tablet 25 mg Q8H    hydrOXYzine HCl tablet 25 mg TID PRN    lisinopril tablet 40 mg Daily    LORazepam tablet 0.5 mg Q12H PRN    ondansetron injection 4 mg Q12H PRN    pantoprazole EC tablet 40 mg Daily    piperacillin-tazobactam 2.25 g in dextrose 5 % 50 mL IVPB (ready to mix system) Q8H    promethazine (PHENERGAN) 6.25 mg in dextrose 5 % 50 mL IVPB Q6H PRN    sodium chloride 0.9% flush 5 mL PRN    [START ON 4/5/2019] vancomycin 1.5 g (PLACE GARDUNO ONLY) in D5W 250 mL IVPB Q72H    vitamin renal formula (B-complex-vitamin c-folic acid) 1 mg per capsule 1 capsule Daily    warfarin tablet 7.5 mg Daily    zolpidem tablet 5 mg Nightly PRN       Objective:     Vital Signs (Most Recent):  Temp: 98 °F (36.7 °C) (03/28/19 0752)  Pulse: 75 (03/28/19 1201)  Resp: 20 (03/28/19 1201)  BP: (!) 155/75 (03/28/19 1102)  SpO2: (!) 93 % (03/28/19 1201)  O2 Device (Oxygen Therapy): room air (03/28/19 1201) Vital Signs (24h Range):  Temp:  [98 °F (36.7 °C)-98.5 °F (36.9 °C)] 98 °F (36.7 °C)  Pulse:  [75-98] 75  Resp:  [16-20] 20  SpO2:  [83 %-98 %] 93 %  BP: (133-177)/(63-93) 155/75     Weight: 98.1 kg (216 lb 4.3 oz) (03/27/19 1023)  Body mass index is 27.77 kg/m².  Body surface area is 2.26 meters squared.    No intake/output data recorded.    Physical Exam   Constitutional: He is oriented to person, place, and time. He appears well-developed.   HENT:   Head: Normocephalic " and atraumatic.   Neck: No JVD present.   Cardiovascular: Normal rate, regular rhythm and normal heart sounds. Exam reveals no friction rub.   Pulmonary/Chest: He is in respiratory distress. He has rales.   Abdominal: Soft. Bowel sounds are normal. He exhibits no distension and no mass.   Musculoskeletal: He exhibits no edema.   Neurological: He is alert and oriented to person, place, and time.   Skin: Skin is warm and dry.   Psychiatric: He has a normal mood and affect. His behavior is normal.   Nursing note and vitals reviewed.      Significant Labs: reviewed  BMP  Lab Results   Component Value Date     03/28/2019    K 4.0 03/28/2019    CL 97 03/28/2019    CO2 29 03/28/2019    BUN 51 (H) 03/28/2019    CREATININE 11.9 (H) 03/28/2019    CALCIUM 9.6 03/28/2019    ANIONGAP 11 03/28/2019    ESTGFRAFRICA 5 (A) 03/28/2019    EGFRNONAA 4 (A) 03/28/2019     Lab Results   Component Value Date    WBC 8.14 03/28/2019    HGB 8.7 (L) 03/28/2019    HCT 26.3 (L) 03/28/2019     (H) 03/28/2019     (L) 03/28/2019         Significant Imaging: reviewed

## 2019-03-28 NOTE — PROGRESS NOTES
Vancomycin Progress :     ESRD on dialysis MWF schedule, Pneumonia, IV ABX : Zosyn/Vanc.   CrCl 8.8 , SCR 11.9 ( up from 9.4 yesterday) WBC 8.14, Tmax 98.5,   1st dose 3/27 vanc 2gm loading dose @ 1315;   3/28 @ 418 random  = 31.1, no dose today,   Vancomycin random ordered for 3/29 with AM labs.     Paty Malcolm RPh. 3/28/2019 10:06 AM

## 2019-03-28 NOTE — PROGRESS NOTES
"[facility]  Nephrology  Progress Note    Patient Name: Isidro White Jr.  MRN: [id number]  Admission Date: 3/27/2019  Hospital Length of Stay: 0 days  Attending Provider: Nicolás Gerardo MD   Primary Care Physician: Provider Notinsystem  Principal Problem:Chest pain    Subjective:     HPI: Pt was seen and examined. H/o and chart reviewed. Pt is a 48 y/o male with ESRD, on chronic HD at [facility] MWF, HTN, hepatitis B (currently on active treatment), diastolic CHF, h/o of AVR 2017, who presented with SOB. Pt received about 2.5 hours of HD today. Per w/u performed, pt has right sided pneumonia. Pt has had sx's of SOB, specially on taking deep breaths, cough, and increased phlegm, slowly worse in the past 2 weeks. Treatment (vancolycin and zosyn) and labs reviewed. Pt was asked about his candidacy for kidney transplant. Records reviewed. Pt is undergoing screening process. He was found to be high risk, however, he was not ruled out, pending completion of cardiac w/u and hepatitis B treatment. See Dr. Lassiter's note from 1/9/19.    Interval History: Pt was seen and examined this am. No new c/o's, no new events, stable last pm. SOB "better", no cough, no fever, no CP    Review of patient's allergies indicates:  No Known Allergies  Current Facility-Administered Medications   Medication Frequency    acetaminophen tablet 650 mg Q8H PRN    albuterol-ipratropium 2.5 mg-0.5 mg/3 mL nebulizer solution 3 mL Q6H    amLODIPine tablet 10 mg Daily    ascorbic acid (vitamin C) tablet 500 mg BID    calcium acetate capsule 667 mg TID    diltiaZEM 24 hr capsule 360 mg Daily    hydrALAZINE tablet 25 mg Q8H    hydrOXYzine HCl tablet 25 mg TID PRN    lisinopril tablet 40 mg Daily    LORazepam tablet 0.5 mg Q12H PRN    ondansetron injection 4 mg Q12H PRN    pantoprazole EC tablet 40 mg Daily    piperacillin-tazobactam 2.25 g in dextrose 5 % 50 mL IVPB (ready to mix system) Q8H    promethazine " (PHENERGAN) 6.25 mg in dextrose 5 % 50 mL IVPB Q6H PRN    sodium chloride 0.9% flush 5 mL PRN    [START ON 4/5/2019] vancomycin 1.5 g (PLACE GARDUNO ONLY) in D5W 250 mL IVPB Q72H    vitamin renal formula (B-complex-vitamin c-folic acid) 1 mg per capsule 1 capsule Daily    warfarin tablet 7.5 mg Daily    zolpidem tablet 5 mg Nightly PRN       Objective:     Vital Signs (Most Recent):  Temp: 98 °F (36.7 °C) (03/28/19 0752)  Pulse: 75 (03/28/19 1201)  Resp: 20 (03/28/19 1201)  BP: (!) 155/75 (03/28/19 1102)  SpO2: (!) 93 % (03/28/19 1201)  O2 Device (Oxygen Therapy): room air (03/28/19 1201) Vital Signs (24h Range):  Temp:  [98 °F (36.7 °C)-98.5 °F (36.9 °C)] 98 °F (36.7 °C)  Pulse:  [75-98] 75  Resp:  [16-20] 20  SpO2:  [83 %-98 %] 93 %  BP: (133-177)/(63-93) 155/75     Weight: 98.1 kg (216 lb 4.3 oz) (03/27/19 1023)  Body mass index is 27.77 kg/m².  Body surface area is 2.26 meters squared.    No intake/output data recorded.    Physical Exam   Constitutional: He is oriented to person, place, and time. He appears well-developed.   HENT:   Head: Normocephalic and atraumatic.   Neck: No JVD present.   Cardiovascular: Normal rate, regular rhythm and normal heart sounds. Exam reveals no friction rub.   Pulmonary/Chest: He is in respiratory distress. He has rales.   Abdominal: Soft. Bowel sounds are normal. He exhibits no distension and no mass.   Musculoskeletal: He exhibits no edema.   Neurological: He is alert and oriented to person, place, and time.   Skin: Skin is warm and dry.   Psychiatric: He has a normal mood and affect. His behavior is normal.   Nursing note and vitals reviewed.      Significant Labs: reviewed  BMP  Lab Results   Component Value Date     03/28/2019    K 4.0 03/28/2019    CL 97 03/28/2019    CO2 29 03/28/2019    BUN 51 (H) 03/28/2019    CREATININE 11.9 (H) 03/28/2019    CALCIUM 9.6 03/28/2019    ANIONGAP 11 03/28/2019    ESTGFRAFRICA 5 (A) 03/28/2019    EGFRNONAA 4 (A) 03/28/2019     Lab  "Results   Component Value Date    WBC 8.14 03/28/2019    HGB 8.7 (L) 03/28/2019    HCT 26.3 (L) 03/28/2019     (H) 03/28/2019     (L) 03/28/2019         Significant Imaging: reviewed    Assessment/Plan:     50 y/o male with ESRD on chronic HD presented with SOB:     ESRD on dialysis  ESRD. Chronic HD q MWF.  S/p HD yesterday as outpt  K normal  O2 sat good  Acid base OK  No indications for further HD today.     Transplant w/u reviewed for the pt  He was advised of the following (per Dr. Lassiter's note from 1/9/19):  "After obtaining history and performing physical exam as well as reviewing available diagnostic studies, Mr. White is a high-risk kidney transplant candidate secondary to significant cardiac history as stated above as well as being on chronic anti-coagulation for mechanical aortic valve.  Final determination of transplant candidacy will be made once workup is complete and reviewed by the selection committee.  Prior to Listing, will need the following items to be completed:  1. Cardiac stress test and TTE along with cardiology clearance  2. Standard serologies and blood work  3. Abdominal US   4. Hepatology consultation and clearance in light of untreated Hepatitis B  5. Urology referral for hematuria "        He was informed that he can reach out to Isis, his SW at the outpt HD unit and to his transplant coordinator for assistance     Essential hypertension  Uncontrolled BP on arrival  Outpt meds were re-started.  BP better controlled    Pt has a h/o of uncontrolled BP, which is likely the cause of his renal failure, though hep B could be the cause as well.  No prior kidney biopsy     Pneumonia  Right sided pneumonia  Community acquired  Was treated with vancomycin and zosyn  Symptoms improved  Discussed with PCP OK to d/c home  Home abx will be levofloxacin      Plans and recommendations:  As discussed above  Advised pt to return to home HD unit jan[facility] tomorrow, and q MWF. Pt " verbalized understanding           Sayda Hi MD  Nephrology  [facility]

## 2019-03-28 NOTE — PROGRESS NOTES
Coumadin Progress :     Indication : Afib, Prosth Aortic Valve.  Goal INR : 2-3, Coumadin dose 7.5 mg daily.   INR 1.5 today ( up from 1.4 yestarday), continue home dose of 7.5mg.     Paty Malcolm RPh. 3/28/2019 10:22 AM

## 2019-03-28 NOTE — CONSULTS
Food & Nutrition  Education    Diet Education: Coumadin/Warfarin and Vitamin K Interaction  Time Spent: 15 minutes  Learners: patient      Nutrition Education provided with handouts: Coumadin/Warfarin and Vitamin K Interaction      Comments: Coumadin/Warfarin Education w/ handout from Nutrition Care Manual w/ RD contact information provided. Pt verbalized understanding. Pt reports no wt loss, good appetite; NFPE not performed.    All questions and concerns answered.        Please Re-consult as needed    Thanks!  Alejandra Cooper RD, LDN

## 2019-03-28 NOTE — ASSESSMENT & PLAN NOTE
ESRD. Chronic HD q MWF.    Transplant w/u reviewed for the pt  He was advised of the following:    He was informed that he can reach out to Isis, his SW at the outpt HD unit and to his transplant coordinator for assistance

## 2019-03-28 NOTE — ASSESSMENT & PLAN NOTE
Uncontrolled BP.  Will re-start outpt BP meds and will monitor  Pt was seen previously by me in 2016  Pt has a h/o of uncontrolled BP, which is likely the cause of his renal failure, though hep B could be the cause as well.  No prior kidney biopsy

## 2019-03-28 NOTE — PLAN OF CARE
Problem: Adult Inpatient Plan of Care  Goal: Plan of Care Review  Outcome: Ongoing (interventions implemented as appropriate)  Patient 's spo2 and breathing are better in the morning.

## 2019-03-28 NOTE — ASSESSMENT & PLAN NOTE
"48 y/o male with ESRD on chronic HD presented with SOB:     ESRD on dialysis  ESRD. Chronic HD q MWF.  S/p HD today as outpt  K normal  O2 sat good  Acid base OK  No indications for further HD today.     Transplant w/u reviewed for the pt  He was advised of the following (per Dr. Lassiter's note from 1/9/19):  "After obtaining history and performing physical exam as well as reviewing available diagnostic studies, Mr. White is a high-risk kidney transplant candidate secondary to significant cardiac history as stated above as well as being on chronic anti-coagulation for mechanical aortic valve.  Final determination of transplant candidacy will be made once workup is complete and reviewed by the selection committee.  Prior to Listing, will need the following items to be completed:  1. Cardiac stress test and TTE along with cardiology clearance  2. Standard serologies and blood work  3. Abdominal US   4. Hepatology consultation and clearance in light of untreated Hepatitis B  5. Urology referral for hematuria "        He was informed that he can reach out to Isis, his SW at the outpt HD unit and to his transplant coordinator for assistance     Essential hypertension  Uncontrolled BP.  Will re-start outpt BP meds and will monitor  Pt was seen previously by me in 2016  Pt has a h/o of uncontrolled BP, which is likely the cause of his renal failure, though hep B could be the cause as well.  No prior kidney biopsy     Pneumonia  Right sided pneumonia  Community acquired  On vancomycin and zosyn  Will get random vanc level in am  Adjust zosyn dose for the renal function        Plans and recommendations:  As discussed above      "

## 2019-03-28 NOTE — NURSING
Went over discharge instructions with patient.   Stressed importance of making and keeping all follow ups as well as making prescribed medication changes.  Patient verbalized understanding and has no questions in regards to discharge.  IV removed, catheter intact.  Telemetry box 8646 removed and returned to monitor tech.  Pt instructed to call nurse station once dressed and ready to be discharged via wheelchair to personal transportation.  Primary nurse notified of pt's discharge status.

## 2019-03-28 NOTE — PLAN OF CARE
Problem: Adult Inpatient Plan of Care  Goal: Plan of Care Review  Outcome: Ongoing (interventions implemented as appropriate)              []Hide copied text    []Hover for details      Food & Nutrition  Education     Diet Education: Coumadin/Warfarin and Vitamin K Interaction  Time Spent: 15 minutes  Learners: patient        Nutrition Education provided with handouts: Coumadin/Warfarin and Vitamin K Interaction        Comments: Coumadin/Warfarin Education w/ handout from Nutrition Care Manual w/ RD contact information provided. Pt verbalized understanding. Pt reports no wt loss, good appetite; NFPE not performed.     All questions and concerns answered.          Please Re-consult as needed     Thanks!  Alejandra Cooper RD, LDN

## 2019-03-28 NOTE — PLAN OF CARE
Problem: Adult Inpatient Plan of Care  Goal: Plan of Care Review  Outcome: Ongoing (interventions implemented as appropriate)  No acute distress noted. Safety measures are in place. Call bell within reach. Pain being managed. Pt free of falls. Will continue to monitor. Chart check complete.

## 2019-03-28 NOTE — PLAN OF CARE
Problem: Adult Inpatient Plan of Care  Goal: Plan of Care Review  Outcome: Ongoing (interventions implemented as appropriate)    Food & Nutrition  Education     Diet Education: Coumadin/Warfarin and Vitamin K Interaction  Time Spent: 15 minutes  Learners: patient        Nutrition Education provided with handouts: Coumadin/Warfarin and Vitamin K Interaction        Comments: Coumadin/Warfarin Education w/ handout from Nutrition Care Manual w/ RD contact information provided. Pt verbalized understanding. Pt reports no wt loss, good appetite; NFPE not performed.     All questions and concerns answered.          Please Re-consult as needed     Thanks!  Alejandra Cooper RD, LDN

## 2019-03-28 NOTE — SUBJECTIVE & OBJECTIVE
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     CARDIAC VALVE SURGERY  02/08/2017    22 mm Medtronic AV, 28 mm TV Medtronic annuloplaty ring    PSNOLNDWT-[id number]-[id number] Accuseal Left 3/16/2017    Performed by Alcides Manning MD at [facility] OR 2ND FLR    RADIOFREQUENCY ABLATION  03/13/2017    Atrial tachycardia    REDO STERNOTOMY WITH AORTIC VALVE REPLACEMENT/redo sternotomy N/A 2/8/2017    Performed by Jose Gastelum MD at [facility] OR 2ND FLR    REPAIR-VALVE-TRICUSPID  2/8/2017    Performed by Jose Gastelum MD at [facility] OR 2ND FLR    REPLACEMENT-VALVE-AORTIC N/A 2/8/2017    Performed by Jose Gastelum MD at [facility] OR 2ND FLR       Review of patient's allergies indicates:  No Known Allergies  Current Facility-Administered Medications   Medication Frequency    acetaminophen tablet 650 mg Q8H PRN    albuterol-ipratropium 2.5 mg-0.5 mg/3 mL nebulizer solution 3 mL Q6H    amLODIPine tablet 10 mg Daily    ascorbic acid (vitamin C) tablet 500 mg BID    calcium acetate capsule 667 mg TID    diltiaZEM 24 hr capsule 360 mg Daily    furosemide tablet 80 mg Daily    hydrOXYzine HCl tablet 25 mg TID PRN    lisinopril tablet 40 mg Daily    ondansetron injection 4 mg Q12H PRN    pantoprazole  EC tablet 40 mg Daily    piperacillin-tazobactam 2.25 g in dextrose 5 % 50 mL IVPB (ready to mix system) Q8H    promethazine (PHENERGAN) 6.25 mg in dextrose 5 % 50 mL IVPB Q6H PRN    sodium chloride 0.9% flush 5 mL PRN    [START ON 4/5/2019] vancomycin 1.5 g (PLACE GARDUNO ONLY) in D5W 250 mL IVPB Q72H    vitamin renal formula (B-complex-vitamin c-folic acid) 1 mg per capsule 1 capsule Daily    warfarin tablet 7.5 mg Daily    zolpidem tablet 5 mg Nightly PRN     Family History     Problem Relation (Age of Onset)    Anesthesia problems Paternal Uncle    Diabetes Paternal Aunt, Paternal Aunt    Heart attack Father    Heart failure Paternal Grandmother    Hypertension Paternal Aunt    Leukemia Mother, Paternal Aunt    No Known Problems Sister, Brother, Sister, Sister    Stroke Paternal Aunt    Suicide Paternal Uncle    Valvular heart disease Maternal Aunt        Tobacco Use    Smoking status: Never Smoker    Smokeless tobacco: Never Used   Substance and Sexual Activity    Alcohol use: No     Frequency: Never     Comment: quit in 2016; does have heavy drinking history; started drinking at age 13; was drinking a 12 pack over the weekend and two 24 ounces of beer a day during the week along with a pint of hard alcohol    Drug use: No    Sexual activity: Not on file     Review of Systems   HENT: Negative.    Respiratory: Positive for cough and shortness of breath.    Cardiovascular: Negative for leg swelling.   Genitourinary: Negative.    Musculoskeletal: Negative.    Neurological: Negative.    Psychiatric/Behavioral: Negative.      Objective:     Vital Signs (Most Recent):  Temp: 98.5 °F (36.9 °C) (03/27/19 1717)  Pulse: 77 (03/27/19 1717)  Resp: 18 (03/27/19 1717)  BP: (!) 177/91 (03/27/19 1717)  SpO2: 96 % (03/27/19 1717)  O2 Device (Oxygen Therapy): nasal cannula (03/27/19 1717) Vital Signs (24h Range):  Temp:  [97.7 °F (36.5 °C)-98.5 °F (36.9 °C)] 98.5 °F (36.9 °C)  Pulse:  [77-83] 77  Resp:  [18-20]  18  SpO2:  [94 %-98 %] 96 %  BP: (155-177)/(77-91) 177/91     Weight: 98.1 kg (216 lb 4.3 oz) (03/27/19 1023)  Body mass index is 27.77 kg/m².  Body surface area is 2.26 meters squared.    No intake/output data recorded.    Physical Exam   Constitutional: He is oriented to person, place, and time. He appears well-developed.   HENT:   Head: Normocephalic and atraumatic.   Neck: No JVD present.   Cardiovascular: Normal rate, regular rhythm and normal heart sounds. Exam reveals no friction rub.   Pulmonary/Chest: He is in respiratory distress. He has rales.   Abdominal: Soft. Bowel sounds are normal. He exhibits no distension and no mass.   Musculoskeletal: He exhibits no edema.   Neurological: He is alert and oriented to person, place, and time.   Skin: Skin is warm and dry.   Psychiatric: He has a normal mood and affect. His behavior is normal.   Nursing note and vitals reviewed.      Significant Labs: reviewed  BMP  Lab Results   Component Value Date     03/27/2019    K 3.8 03/27/2019    CL 98 03/27/2019    CO2 26 03/27/2019    BUN 40 (H) 03/27/2019    CREATININE 9.4 (H) 03/27/2019    CALCIUM 9.8 03/27/2019    ANIONGAP 14 03/27/2019    ESTGFRAFRICA 7 (A) 03/27/2019    EGFRNONAA 6 (A) 03/27/2019     Lab Results   Component Value Date    WBC 6.45 03/27/2019    HGB 8.7 (L) 03/27/2019    HCT 25.8 (L) 03/27/2019     (H) 03/27/2019     (L) 03/27/2019     Blood cx pending  Recent Labs   Lab 03/27/19  1729   TROPONINI 0.064*       Significant Imaging: CXR reviewed, has right sided infltrate

## 2019-03-28 NOTE — DISCHARGE SUMMARY
[facility] Hospital Medicine  Discharge Summary      Patient Name: Isidro White Jr.  MRN: [id number]  Admission Date: 3/27/2019  Hospital Length of Stay: 0 days  Discharge Date and Time:  03/28/2019 12:23 PM  Attending Physician: Nicolás Gerardo MD   Discharging Provider: Roro Zurita NP  Primary Care Provider: Provider Notinsystem      HPI:   Isidro White Jr. is a 49 y.o. male pt with  PMHx of afib, cardiomyopathy, CHF, ESRD on dialysis M/W/F, hepatitis b, and HTN who presents to the Emergency Department for right side CP which onset gradually 2-3 weeks ago with worsening today while having dialysis.  Patient was sent over from dialysis center by Dr Hurst for further evaluation of chest pain.  Associated sxs include SOB (for 3-4 weeks) and sleep apnea. Patient denies any fever, chills, diaphoresis, N/V, abd pain, back pain, neck pain, HA, dizziness, and all other sxs at this time. No prior Tx reported.  ED work-up revealed H/H 8.7/25.8, platelets 118, , troponin 0.067, BNP 1355, BUN 40, Creatinine 9.4, CXR suggest pneumonia in the midportion of the right lung and right  tiny pleural effusion. Patient admitted to observation for pneumonia and chest pain rule out under the care of hospital medicine.         * No surgery found *      Hospital Course:   49 year old male admitted for pneumonia and chest pain. ED work-up revealed H/H 8.7/25.8, platelets 118, , troponin 0.067, BNP 1355, BUN 40, Creatinine 9.4, CXR suggest pneumonia in the midportion of the right lung and right  tiny pleural effusion. Patient started on IV vancomycin and zosyn empirically. Duoneb treatments and supplemental oxygen. 3/28/19- Serial troponin's flat. Denies any chest pain or SOB. Symptoms improved. Labs reviewed and stable. Case discussed with rosa elena Nicholson to discharge from nephrology standpoint. Resume scheduled HD. Patient ready for discharge. Home meds reconciled. New prescription given  for Augmentin. Patient to follow-up with PCP in 3 days for hospital follow-up of pna and chest pain. Patient also to follow-up with nephrology and cardiology outpatient. Patient seen and examined on the date of discharge and found suitable for discharge.          Consults:   Consults (From admission, onward)        Status Ordering Provider     Inpatient consult to Nephrology  Once     Provider:  Sayda Hi MD    Acknowledged LISHA GODDARD     Nutrition Services Referral  Once     Provider:  (Not yet assigned)    Completed DEEPTHI LOZANO     Pharmacy to dose Vancomycin consult  Once     Provider:  (Not yet assigned)    Acknowledged FELTON CAMPOS     Pharmacy to dose Warfarin consult  Once     Provider:  (Not yet assigned)    Acknowledged LISHA GODDARD            Final Active Diagnoses:    Diagnosis Date Noted POA    Pneumonia [J18.9] 03/27/2019 Yes    Atrial fibrillation [I48.91] 03/27/2019 Yes    Chronic diastolic heart failure [I50.32] 03/11/2017 Yes    Anemia of chronic disease [D63.8] 03/10/2017 Yes    Pleural effusion [J90] 03/09/2017 Yes    ESRD on dialysis [N18.6, Z99.2]  Not Applicable     Chronic    S/P AVR (aortic valve replacement) [Z95.2] 02/09/2017 Not Applicable    Essential hypertension [I10]  Yes      Problems Resolved During this Admission:    Diagnosis Date Noted Date Resolved POA    PRINCIPAL PROBLEM:  Chest pain [R07.9] 03/27/2019 03/28/2019 Yes    SOB (shortness of breath) [R06.02]  03/28/2019 Yes       Discharged Condition: stable    Disposition: Home or Self Care    Follow Up:  Follow-up Information     Jacquelyn Skelton MD In 3 days.    Specialties:  Nephrology, Internal Medicine  Why:  hospital follow-up of PNA and chest pain  Contact information:  [address] [facility] [address]  [phone]             Fuad Gudino Md, MD In 1 week.    Specialties:  Cardiology, Internal Medicine  Why:  hospital follow-up of chest pain   Contact information:  [address]  [place] [address]  [phone]             Gus Hurst MD In 1 week.    Specialty:  Nephrology  Why:  hospital follow-up   Contact information:  [address]  [place] [address]  [phone]                 Patient Instructions:      Notify your health care provider if you experience any of the following:  temperature >100.4     Notify your health care provider if you experience any of the following:  severe uncontrolled pain     Notify your health care provider if you experience any of the following:  difficulty breathing or increased cough     Notify your health care provider if you experience any of the following:  persistent dizziness, light-headedness, or visual disturbances     Notify your health care provider if you experience any of the following:  increased confusion or weakness     Activity as tolerated       Significant Diagnostic Studies: Labs:   BMP:   Recent Labs   Lab 03/27/19  1055 03/28/19 0418   GLU 92 81    137   K 3.8 4.0   CL 98 97   CO2 26 29   BUN 40* 51*   CREATININE 9.4* 11.9*   CALCIUM 9.8 9.6   , CMP   Recent Labs   Lab 03/27/19  1055 03/28/19 0418    137   K 3.8 4.0   CL 98 97   CO2 26 29   GLU 92 81   BUN 40* 51*   CREATININE 9.4* 11.9*   CALCIUM 9.8 9.6   PROT 8.4  --    ALBUMIN 3.2*  --    BILITOT 0.8  --    ALKPHOS 80  --    AST 32  --    ALT 50*  --    ANIONGAP 14 11   ESTGFRAFRICA 7* 5*   EGFRNONAA 6* 4*   , CBC   Recent Labs   Lab 03/27/19  1055 03/28/19 0418   WBC 6.45 8.14   HGB 8.7* 8.7*   HCT 25.8* 26.3*   * 120*   , Troponin   Recent Labs   Lab 03/27/19 2126   TROPONINI 0.059*    and All labs within the past 24 hours have been reviewed    Pending Diagnostic Studies:     None         Medications:  Reconciled Home Medications:      Medication List      START taking these medications    amoxicillin-clavulanate 500-125mg 500-125 mg Tab  Commonly known as:  AUGMENTIN  Take 1 tablet (500 mg total) by mouth once daily. for 10  days     ipratropium-albuterol  mcg/actuation inhaler  Commonly known as:  COMBIVENT  Inhale 1 puff into the lungs every 4 (four) hours as needed for Wheezing or Shortness of Breath. Rescue        CONTINUE taking these medications    amLODIPine 10 MG tablet  Commonly known as:  NORVASC  Take 1 tablet (10 mg total) by mouth once daily.     ascorbic acid (vitamin C) 500 MG tablet  Commonly known as:  VITAMIN C  Take 1 tablet (500 mg total) by mouth 2 (two) times daily.     b complex vitamins tablet  Take 1 tablet by mouth once daily.     calcium acetate 667 mg capsule  Commonly known as:  PHOSLO  Take 1 capsule (667 mg total) by mouth 3 (three) times daily.     diltiaZEM 360 MG 24 hr capsule  Commonly known as:  CARDIZEM CD  Take 1 capsule (360 mg total) by mouth once daily.     epoetin vikki 10,000 unit/mL injection  Commonly known as:  PROCRIT  Inject 1 mL (10,000 Units total) into the skin every Mon, Wed, Fri. To be given with HD     furosemide 80 MG tablet  Commonly known as:  LASIX  TAKE ONE TABLET BY MOUTH EVERY DAY     hydrOXYzine HCl 25 MG tablet  Commonly known as:  ATARAX  Take 1 tablet (25 mg total) by mouth 3 (three) times daily as needed for Itching.     labetalol 100 MG tablet  Commonly known as:  NORMODYNE  TAKE ONE TABLET BY MOUTH THREE TIMES DAILY AS NEEDED IF HEART RATE GREATER THAN 120     lisinopril 40 MG tablet  Commonly known as:  PRINIVIL,ZESTRIL  TAKE ONE TABLET BY MOUTH EVERY DAY     multivitamin with minerals tablet  Take 1 tablet by mouth once daily.     omega-3 fatty acids-vitamin E 1,000 mg Cap  Commonly known as:  FISH OIL  Take 1 capsule by mouth once daily.     pantoprazole 40 MG tablet  Commonly known as:  PROTONIX  TAKE ONE TABLET BY MOUTH EVERY DAY     ETHAN-RACQUEL RX 1- mg-mg-mcg Tab  Generic drug:  vit b cmplx 3-fa-vit c-biotin 1- mg-mg-mcg  TAKE ONE TABLET BY MOUTH EVERY DAY     tenofovir 300 mg Tab  Commonly known as:  VIREAD  Take 1 tablet (300 mg total) by mouth  once a week. AFTER DIALYSIS     warfarin 7.5 MG tablet  Commonly known as:  COUMADIN  Take 1 tablet (7.5 mg total) by mouth Daily.     zolpidem 5 MG Tab  Commonly known as:  AMBIEN  TAKE ONE TABLET BY MOUTH EACH EVENING AT BEDTIME AS NEEDED FOR INSOMNIA            Indwelling Lines/Drains at time of discharge:   Lines/Drains/Airways     Drain                 Hemodialysis AV Graft 03/16/17 0859 Left upper arm 742 days                Time spent on the discharge of patient: 35 minutes  Patient was seen and examined on the date of discharge and determined to be suitable for discharge.         Roro Zurita NP  Department of Hospital Medicine  [facility]

## 2019-03-29 NOTE — PLAN OF CARE
03/29/19 0914   Final Note   Assessment Type Final Discharge Note   Anticipated Discharge Disposition Home   Right Care Referral Info   Post Acute Recommendation No Care

## 2019-04-01 LAB
BACTERIA BLD CULT: NORMAL
BACTERIA BLD CULT: NORMAL

## 2019-04-03 ENCOUNTER — TELEPHONE (OUTPATIENT)
Dept: TRANSPLANT | Facility: CLINIC | Age: 49
End: 2019-04-03

## 2019-04-03 RX ORDER — VIT B COMP NO.3/FOLIC/C/BIOTIN 1 MG-60 MG
TABLET ORAL
Qty: 90 TABLET | Refills: 2 | Status: SHIPPED | OUTPATIENT
Start: 2019-04-03 | End: 2020-04-03 | Stop reason: SDUPTHER

## 2019-04-03 NOTE — TELEPHONE ENCOUNTER
----- Message from Consuelo Hewitt sent at 4/3/2019  4:06 PM CDT -----  [facility]      ----- Message -----  From: Yoli Gutierrez  Sent: 4/3/2019   2:16 PM  To: [facility] Pre-Kidney Transplant Clinical    Needs Advice    Reason for call: Called to check status on transplant case.         Communication Preference: Lito [phone] or [phone]     Additional Information: n/a

## 2019-04-05 ENCOUNTER — TELEPHONE (OUTPATIENT)
Dept: TRANSPLANT | Facility: CLINIC | Age: 49
End: 2019-04-05

## 2019-04-05 NOTE — TELEPHONE ENCOUNTER
----- Message from Cristhian Clements sent at 4/5/2019 10:02 AM CDT -----  Contact: Lina brian/ Domingo   Needs Advice    Reason for call: Returning Analilia call        Communication Preference: [phone]    Additional Information: N/A

## 2019-04-05 NOTE — TELEPHONE ENCOUNTER
Informed that pt has follow up appointment for Card in 6/18/19, Cysto scheduled 4/8/19 and Hep follow up 5/14/19.

## 2019-04-08 ENCOUNTER — OFFICE VISIT (OUTPATIENT)
Dept: UROLOGY | Facility: CLINIC | Age: 49
End: 2019-04-08
Payer: COMMERCIAL

## 2019-04-08 DIAGNOSIS — R31.9 HEMATURIA, UNSPECIFIED TYPE: Primary | ICD-10-CM

## 2019-04-08 PROCEDURE — 99214 OFFICE O/P EST MOD 30 MIN: CPT | Mod: S$PBB,,, | Performed by: UROLOGY

## 2019-04-08 PROCEDURE — 99214 PR OFFICE/OUTPT VISIT, EST, LEVL IV, 30-39 MIN: ICD-10-PCS | Mod: S$PBB,,, | Performed by: UROLOGY

## 2019-04-08 NOTE — PROGRESS NOTES
Chief Complaint: Hematuria    HPI:   4/8/19: No hematuria since last visit.  Pt advised that cystoscopy is recommended and he declines.  Reviewed history in detail.  Discussion of risks/benefits was had.  2/28/19: 50 yo man voids about once a day since he is a MWF dialysis pt.  Saw a clot and gross hematuria about 2-3 weeks ago on one occasion.  No abd/pelvic pain and no exac/rel factors.  No urolithiasis.  Weak stream.  No  history.  Normal sexual function until recently.  Had a CT Urogram that shows no abnormalities except renal cystic function consistent with ESRD.    Allergies:  Patient has no known allergies.    Medications:  has a current medication list which includes the following prescription(s): amlodipine, ascorbic acid (vitamin c), b complex vitamins, calcium acetate, diltiazem, epoetin vikki, furosemide, hydroxyzine hcl, ipratropium-albuterol, labetalol, lisinopril, multivitamin with minerals, omega-3 fatty acids-vitamin e, pantoprazole, shawna-nataly rx, tenofovir, warfarin, and zolpidem.    Review of Systems:  General: No fever, chills, fatigability, or weight loss.  Skin: No rashes, itching, or changes in color or texture of skin.  Chest: Denies HAY, cyanosis, wheezing, cough, and sputum production.  Abdomen: Appetite fine. No weight loss. Denies diarrhea, abdominal pain, hematemesis, or blood in stool.  Musculoskeletal: No joint stiffness or swelling. Denies back pain.  : As above.  All other review of systems negative.    PMH:   has a past medical history of Aortic valve stenosis, Atrial fibrillation, Atrial flutter, Cardiomyopathy, CHF (congestive heart failure), Drug-induced erectile dysfunction, ESRD due to hypertension, ESRD on dialysis, Hepatitis B, Hyperlipidemia, Hypertension, Secondary hyperparathyroidism of renal origin, Supraventricular tachycardia, Tachycardia, and Valvular regurgitation.    PSH:   has a past surgical history that includes ASD repair; Cardiac valuve replacement  (02/08/2017); Radiofrequency ablation (03/13/2017); Cardiac catheterization; and AV Graft Creation (Left, 03/2017).    FamHx: family history includes Anesthesia problems in his paternal uncle; Diabetes in his paternal aunt and paternal aunt; Heart attack in his father; Heart failure in his paternal grandmother; Hypertension in his paternal aunt; Leukemia in his mother and paternal aunt; No Known Problems in his brother, sister, sister, and sister; Stroke in his paternal aunt; Suicide in his paternal uncle; Valvular heart disease in his maternal aunt.    SocHx:  reports that he has never smoked. He has never used smokeless tobacco. He reports that he does not drink alcohol or use drugs.      Physical Exam:  There were no vitals filed for this visit.  General: A&Ox3, no apparent distress, no deformities  Neck: No masses, normal thyroid  Lungs: normal inspiration, no use of accessory muscles  Heart: normal pulse, no arrhythmias  Abdomen: Soft, NT, ND  Skin: The skin is warm and dry. No jaundice.  Ext: No c/c/e.  :   2/19: Test desc felicita, no abnormalities of epididymus. Penis normal, with normal penile and scrotal skin. Meatus normal.     Labs/Studies:     Impression/Plan:   1. Hematuria workup incomplete pt declines further workup. D/C from clinic.

## 2019-04-17 ENCOUNTER — HOSPITAL ENCOUNTER (EMERGENCY)
Facility: HOSPITAL | Age: 49
Discharge: HOME | End: 2019-04-17
Attending: EMERGENCY MEDICINE
Payer: COMMERCIAL

## 2019-04-17 ENCOUNTER — TELEPHONE (OUTPATIENT)
Dept: TRANSPLANT | Facility: CLINIC | Age: 49
End: 2019-04-17

## 2019-04-17 VITALS
BODY MASS INDEX: 27.49 KG/M2 | DIASTOLIC BLOOD PRESSURE: 74 MMHG | TEMPERATURE: 99 F | SYSTOLIC BLOOD PRESSURE: 143 MMHG | RESPIRATION RATE: 24 BRPM | HEART RATE: 84 BPM | WEIGHT: 214.19 LBS | OXYGEN SATURATION: 90 % | HEIGHT: 74 IN

## 2019-04-17 DIAGNOSIS — I27.20 PULMONARY HYPERTENSION: ICD-10-CM

## 2019-04-17 DIAGNOSIS — Z99.2 ESRD ON DIALYSIS: Primary | Chronic | ICD-10-CM

## 2019-04-17 DIAGNOSIS — Z76.82 ORGAN TRANSPLANT CANDIDATE: ICD-10-CM

## 2019-04-17 DIAGNOSIS — R06.02 SHORTNESS OF BREATH: ICD-10-CM

## 2019-04-17 DIAGNOSIS — N18.6 ESRD (END STAGE RENAL DISEASE) ON DIALYSIS: Primary | ICD-10-CM

## 2019-04-17 DIAGNOSIS — R06.00 DYSPNEA, UNSPECIFIED TYPE: ICD-10-CM

## 2019-04-17 DIAGNOSIS — Z99.2 ESRD (END STAGE RENAL DISEASE) ON DIALYSIS: Primary | ICD-10-CM

## 2019-04-17 DIAGNOSIS — D64.9 CHRONIC ANEMIA: ICD-10-CM

## 2019-04-17 DIAGNOSIS — N18.6 ESRD ON DIALYSIS: Primary | Chronic | ICD-10-CM

## 2019-04-17 LAB
ALBUMIN SERPL BCP-MCNC: 2.9 G/DL (ref 3.5–5.2)
ALLENS TEST: ABNORMAL
ALP SERPL-CCNC: 75 U/L (ref 55–135)
ALT SERPL W/O P-5'-P-CCNC: 38 U/L (ref 10–44)
ANION GAP SERPL CALC-SCNC: 11 MMOL/L (ref 8–16)
APTT BLDCRRT: 30 SEC (ref 21–32)
AST SERPL-CCNC: 30 U/L (ref 10–40)
BASOPHILS # BLD AUTO: 0.01 K/UL (ref 0–0.2)
BASOPHILS NFR BLD: 0.2 % (ref 0–1.9)
BILIRUB SERPL-MCNC: 0.6 MG/DL (ref 0.1–1)
BNP SERPL-MCNC: 854 PG/ML (ref 0–99)
BUN SERPL-MCNC: 27 MG/DL (ref 6–20)
CALCIUM SERPL-MCNC: 9.3 MG/DL (ref 8.7–10.5)
CHLORIDE SERPL-SCNC: 100 MMOL/L (ref 95–110)
CO2 SERPL-SCNC: 29 MMOL/L (ref 23–29)
CREAT SERPL-MCNC: 7.7 MG/DL (ref 0.5–1.4)
DELSYS: ABNORMAL
DIFFERENTIAL METHOD: ABNORMAL
EOSINOPHIL # BLD AUTO: 0.6 K/UL (ref 0–0.5)
EOSINOPHIL NFR BLD: 9.8 % (ref 0–8)
ERYTHROCYTE [DISTWIDTH] IN BLOOD BY AUTOMATED COUNT: 14.2 % (ref 11.5–14.5)
EST. GFR  (AFRICAN AMERICAN): 9 ML/MIN/1.73 M^2
EST. GFR  (NON AFRICAN AMERICAN): 8 ML/MIN/1.73 M^2
FIO2: 21
FLOW: 0
GLUCOSE SERPL-MCNC: 104 MG/DL (ref 70–110)
HCO3 UR-SCNC: 32.5 MMOL/L (ref 24–28)
HCT VFR BLD AUTO: 22.5 % (ref 40–54)
HGB BLD-MCNC: 7.7 G/DL (ref 14–18)
INFLUENZA A, MOLECULAR: NEGATIVE
INFLUENZA B, MOLECULAR: NEGATIVE
INR PPP: 1.1 (ref 0.8–1.2)
LYMPHOCYTES # BLD AUTO: 1.1 K/UL (ref 1–4.8)
LYMPHOCYTES NFR BLD: 20.1 % (ref 18–48)
MCH RBC QN AUTO: 36.8 PG (ref 27–31)
MCHC RBC AUTO-ENTMCNC: 34.2 G/DL (ref 32–36)
MCV RBC AUTO: 108 FL (ref 82–98)
MODE: ABNORMAL
MONOCYTES # BLD AUTO: 0.8 K/UL (ref 0.3–1)
MONOCYTES NFR BLD: 14.6 % (ref 4–15)
NEUTROPHILS # BLD AUTO: 3.1 K/UL (ref 1.8–7.7)
NEUTROPHILS NFR BLD: 55.5 % (ref 38–73)
PCO2 BLDA: 36.3 MMHG (ref 35–45)
PH SMN: 7.56 [PH] (ref 7.35–7.45)
PLATELET # BLD AUTO: 102 K/UL (ref 150–350)
PMV BLD AUTO: 9.4 FL (ref 9.2–12.9)
PO2 BLDA: 61 MMHG (ref 80–100)
POC BE: 10 MMOL/L
POC SATURATED O2: 94 % (ref 95–100)
POTASSIUM SERPL-SCNC: 3.7 MMOL/L (ref 3.5–5.1)
PROT SERPL-MCNC: 7.7 G/DL (ref 6–8.4)
PROTHROMBIN TIME: 11.9 SEC (ref 9–12.5)
RBC # BLD AUTO: 2.09 M/UL (ref 4.6–6.2)
SAMPLE: ABNORMAL
SITE: ABNORMAL
SODIUM SERPL-SCNC: 140 MMOL/L (ref 136–145)
SPECIMEN SOURCE: NORMAL
TROPONIN I SERPL DL<=0.01 NG/ML-MCNC: 0.04 NG/ML (ref 0–0.03)
WBC # BLD AUTO: 5.63 K/UL (ref 3.9–12.7)

## 2019-04-17 PROCEDURE — 85610 PROTHROMBIN TIME: CPT | Mod: NTX

## 2019-04-17 PROCEDURE — 93010 EKG 12-LEAD: ICD-10-PCS | Mod: NTX,,, | Performed by: INTERNAL MEDICINE

## 2019-04-17 PROCEDURE — 80053 COMPREHEN METABOLIC PANEL: CPT | Mod: NTX

## 2019-04-17 PROCEDURE — 85025 COMPLETE CBC W/AUTO DIFF WBC: CPT | Mod: NTX

## 2019-04-17 PROCEDURE — 36600 WITHDRAWAL OF ARTERIAL BLOOD: CPT | Mod: NTX

## 2019-04-17 PROCEDURE — 82803 BLOOD GASES ANY COMBINATION: CPT | Mod: NTX

## 2019-04-17 PROCEDURE — 99285 EMERGENCY DEPT VISIT HI MDM: CPT | Mod: 25,NTX

## 2019-04-17 PROCEDURE — 93010 ELECTROCARDIOGRAM REPORT: CPT | Mod: NTX,,, | Performed by: INTERNAL MEDICINE

## 2019-04-17 PROCEDURE — 85730 THROMBOPLASTIN TIME PARTIAL: CPT | Mod: NTX

## 2019-04-17 PROCEDURE — 87502 INFLUENZA DNA AMP PROBE: CPT | Mod: NTX

## 2019-04-17 PROCEDURE — 83880 ASSAY OF NATRIURETIC PEPTIDE: CPT | Mod: NTX

## 2019-04-17 PROCEDURE — 84484 ASSAY OF TROPONIN QUANT: CPT | Mod: NTX

## 2019-04-17 PROCEDURE — 93005 ELECTROCARDIOGRAM TRACING: CPT | Mod: NTX

## 2019-04-17 PROCEDURE — 82800 BLOOD PH: CPT | Mod: NTX

## 2019-04-17 RX ORDER — AMOXICILLIN AND CLAVULANATE POTASSIUM 875; 125 MG/1; MG/1
TABLET, FILM COATED ORAL
Status: ON HOLD | COMMUNITY
Start: 2019-04-09 | End: 2019-05-01 | Stop reason: HOSPADM

## 2019-04-17 NOTE — ED NOTES
Patient identifiers verified and correct for Isidro White .    LOC: The patient is awake, alert and aware of environment with an appropriate affect, the patient is oriented x 3 and speaking appropriately.  APPEARANCE: Patient resting comfortably and in no acute distress, patient is clean and well groomed, patient's clothing is properly fastened.  SKIN: The skin is warm and dry, color consistent with ethnicity, patient has normal skin turgor and moist mucus membranes, skin intact, no breakdown or bruising noted.  MUSCULOSKELETAL: Patient moving all extremities spontaneously, no obvious swelling or deformities noted. Mild edema B legs  RESPIRATORY: Airway is open and patent, respirations are spontaneous, patient has a normal effort and rate, no accessory muscle use noted, bilateral breath sounds clear. Pt reports SOB  CARDIAC: Patient has a normal rate and regular rhythm, no periphreal edema noted, capillary refill < 3 seconds.  ABDOMEN: Soft and non tender to palpation, no distention noted, normoactive bowel sounds present in all four quadrants.  NEUROLOGIC: PERRL, **mm bilaterally, eyes open spontaneously, behavior appropriate to situation, follows commands, facial expression symmetrical, bilateral hand grasp equal and even, purposeful motor response noted, normal sensation in all extremities when touched with a finger.

## 2019-04-17 NOTE — ED PROVIDER NOTES
SCRIBE #1 NOTE: I, Shamika Mays/Addie Rivas, am scribing for, and in the presence of, Cathy Morgan MD. I have scribed the entire note.         History     Chief Complaint   Patient presents with    Shortness of Breath     patient states he was diagnosed with pneumonia 2-3weeks ago; patient states he went ot dialysis today and Dr. Dias told him to come to the ER if he still feels bad after dialysis      Review of patient's allergies indicates:  No Known Allergies      History of Present Illness     HPI    4/17/2019, 3:06 PM  History obtained from the patient      History of Present Illness: Isidro White Jr. is a 49 y.o. male patient with a PMHx of A-fib, CHF, ESRD, HTN, HLD who presents to the Emergency Department for evaluation of SOB x 2 months. Patient reports SOB worsening after receiving dialysis today. Symptoms are constant and moderate in severity. No mitigating or exacerbating factors reported. Associated sxs include productive cough, dizziness, bilateral leg swelling and light-headedness. Patient denies any fever, orthopnea, PND, sore throat, CP, N/V/D, dysuria, back pain, syncope, and all other sxs at this time. Patient reports being Dx with PNA on 4/9/19 and began Abx. Patient stated his sx did not resolve. Patient has no further complaints or concerns at this time.         Arrival mode: Personal vehicle     PCP: Jacquelyn Skelton MD        Past Medical History:  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     HD M, W, F    ESRD on dialysis     Hepatitis B     Hyperlipidemia     Hypertension     Secondary hyperparathyroidism of renal origin     Supraventricular tachycardia     atrial tachycardia    Tachycardia     Valvular regurgitation     AI, TR       Past Surgical  History:  Past Surgical History:   Procedure Laterality Date    ABLATION N/A 3/13/2017    Performed by [facility]el [facility]allpa [facility] at [facility] CATH LAB    ASD REPAIR      age 8 [facility]    AV Graft Creation Left 03/2017    CARDIAC CATHETERIZATION      [facility]     CARDIAC VALVE SURGERY  02/08/2017    22 mm Medtronic AV, 28 mm TV Medtronic annuloplaty ring    RLBVSXIAQ-IRGNL-PEABMETHYYRTX Accuseal Left 3/16/2017    Performed by Alcides Manning MD at [facility] OR 2ND FLR    RADIOFREQUENCY ABLATION  03/13/2017    Atrial tachycardia    REDO STERNOTOMY WITH AORTIC VALVE REPLACEMENT/redo sternotomy N/A 2/8/2017    Performed by Jose Gastelum MD at [facility] OR 2ND FLR    REPAIR-VALVE-TRICUSPID  2/8/2017    Performed by [facility]um [facility] at [facility] OR 2ND FLR    REPLACEMENT-VALVE-AORTIC N/A 2/8/2017    Performed by Jose [facility]elum MD at [facility] OR 2ND FLR         Family History:  Family History   Problem Relation Age of Onset    Leukemia Mother     Heart attack Father         massive MI at age 67    No Known Problems Sister     No Known Problems Brother     No Known Problems Sister     No Known Problems Sister     Heart failure Paternal Grandmother     Diabetes Paternal Aunt     Hypertension Paternal Aunt     Leukemia Paternal Aunt         CLL    Suicide Paternal Uncle     Anesthesia problems Paternal Uncle     Diabetes Paternal Aunt     Stroke Paternal Aunt     Valvular heart disease Maternal Aunt     Kidney disease Neg Hx     Colon cancer Neg Hx        Social History:  Social History     Tobacco Use    Smoking status: Never Smoker    Smokeless tobacco: Never Used   Substance and Sexual Activity    Alcohol use: No     Frequency: Never     Comment: quit in 2016; does have heavy drinking history; started drinking at age 13; was drinking a 12 pack over the weekend and two 24 ounces of beer a day during the week along with a pint of hard alcohol    Drug use: No    Sexual activity: Unknown         Review of Systems     Review of Systems   Constitutional: Negative for fever.   HENT: Negative for sore throat.    Respiratory: Positive for cough (productive) and shortness of breath.    Cardiovascular: Positive for leg swelling (bilat). Negative for chest pain.   Gastrointestinal: Negative for diarrhea, nausea and vomiting.   Genitourinary: Negative for dysuria.   Musculoskeletal: Negative for back pain.   Skin: Negative for rash.   Neurological: Positive for dizziness and light-headedness. Negative for syncope and weakness.   Hematological: Does not bruise/bleed easily.   All other systems reviewed and are negative.       Physical Exam     Initial Vitals [04/17/19 1438]   BP Pulse Resp Temp SpO2   136/71 90 16 99.2 °F (37.3 °C) (!) 92 %      MAP       --          Physical Exam  Nursing Notes and Vital Signs Reviewed.  Constitutional: Patient is in no apparent distress. Well-developed and well-nourished.  Head: Atraumatic. Normocephalic.  Eyes: PERRL. EOM intact. Conjunctivae are not pale. No scleral icterus.  ENT: Mucous membranes are moist. Oropharynx is clear and symmetric.    Neck: Supple. Full ROM. No lymphadenopathy.  Cardiovascular: Regular rate. Regular rhythm. No murmurs, rubs, or gallops. Distal pulses are 2+ and symmetric.  Pulmonary/Chest: No respiratory distress. Clear to auscultation bilaterally. No wheezing or rales.  Abdominal: Soft and non-distended.  There is no tenderness.  No rebound, guarding, or rigidity. Good bowel sounds.  Genitourinary: No CVA tenderness  Musculoskeletal: Moves all extremities. No obvious deformities. Shunt to LUE. No appreciation of edema to BLE.  Neurological:  Alert, awake, and appropriate.  Normal speech.  No acute focal neurological deficits are appreciated.  Psychiatric: Normal affect. Good eye contact. Appropriate in content.     ED Course   Procedures  ED Vital Signs:  Vitals:    04/17/19 1438   BP: 136/71   Pulse: 90   Resp: 16   Temp: 99.2 °F (37.3 °C)   TempSrc:  "Oral   SpO2: (!) 92%   Weight: 97.1 kg (214 lb 2.8 oz)   Height: 6' 2" (1.88 m)       Abnormal Lab Results:  Labs Reviewed   CBC W/ AUTO DIFFERENTIAL - Abnormal; Notable for the following components:       Result Value    RBC 2.09 (*)     Hemoglobin 7.7 (*)     Hematocrit 22.5 (*)      (*)     MCH 36.8 (*)     Platelets 102 (*)     All other components within normal limits   COMPREHENSIVE METABOLIC PANEL - Abnormal; Notable for the following components:    BUN, Bld 27 (*)     Creatinine 7.7 (*)     Albumin 2.9 (*)     eGFR if  9 (*)     eGFR if non  8 (*)     All other components within normal limits   TROPONIN I - Abnormal; Notable for the following components:    Troponin I 0.041 (*)     All other components within normal limits   ISTAT PROCEDURE - Abnormal; Notable for the following components:    POC PH 7.560 (*)     POC PO2 61 (*)     POC HCO3 32.5 (*)     POC SATURATED O2 94 (*)     All other components within normal limits   INFLUENZA A & B BY MOLECULAR   PROTIME-INR   APTT   B-TYPE NATRIURETIC PEPTIDE        All Lab Results:  Results for orders placed or performed during the hospital encounter of 04/17/19   Influenza A & B by Molecular   Result Value Ref Range    Influenza A, Molecular Negative Negative    Influenza B, Molecular Negative Negative    Flu A & B Source Nasal swab    CBC auto differential   Result Value Ref Range    WBC 5.63 3.90 - 12.70 K/uL    RBC 2.09 (L) 4.60 - 6.20 M/uL    Hemoglobin 7.7 (L) 14.0 - 18.0 g/dL    Hematocrit 22.5 (L) 40.0 - 54.0 %     (H) 82 - 98 fL    MCH 36.8 (H) 27.0 - 31.0 pg    MCHC 34.2 32.0 - 36.0 g/dL    RDW 14.2 11.5 - 14.5 %    Platelets 102 (L) 150 - 350 K/uL    MPV 9.4 9.2 - 12.9 fL   Comprehensive metabolic panel   Result Value Ref Range    Sodium 140 136 - 145 mmol/L    Potassium 3.7 3.5 - 5.1 mmol/L    Chloride 100 95 - 110 mmol/L    CO2 29 23 - 29 mmol/L    Glucose 104 70 - 110 mg/dL    BUN, Bld 27 (H) 6 - 20 mg/dL "    Creatinine 7.7 (H) 0.5 - 1.4 mg/dL    Calcium 9.3 8.7 - 10.5 mg/dL    Total Protein 7.7 6.0 - 8.4 g/dL    Albumin 2.9 (L) 3.5 - 5.2 g/dL    Total Bilirubin 0.6 0.1 - 1.0 mg/dL    Alkaline Phosphatase 75 55 - 135 U/L    AST 30 10 - 40 U/L    ALT 38 10 - 44 U/L    Anion Gap 11 8 - 16 mmol/L    eGFR if African American 9 (A) >60 mL/min/1.73 m^2    eGFR if non African American 8 (A) >60 mL/min/1.73 m^2   Troponin I #1   Result Value Ref Range    Troponin I 0.041 (H) 0.000 - 0.026 ng/mL   Protime-INR   Result Value Ref Range    Prothrombin Time 11.9 9.0 - 12.5 sec    INR 1.1 0.8 - 1.2   APTT   Result Value Ref Range    aPTT 30.0 21.0 - 32.0 sec   ISTAT PROCEDURE   Result Value Ref Range    POC PH 7.560 (HH) 7.35 - 7.45    POC PCO2 36.3 35 - 45 mmHg    POC PO2 61 (L) 80 - 100 mmHg    POC HCO3 32.5 (H) 24 - 28 mmol/L    POC BE 10 -2 to 2 mmol/L    POC SATURATED O2 94 (L) 95 - 100 %    Sample ARTERIAL     Site RR     Allens Test Pass     DelSys Room Air     Mode SPONT     Flow 0     FiO2 21        Imaging Results:  Imaging Results          X-Ray Chest AP Portable (Final result)  Result time 04/17/19 15:32:18    Final result by Lisy Bolanos MD (Timothy) (04/17/19 15:32:18)                 Impression:      Stable area of hazy opacification involving the right mid lung.  Continued follow-up recommended.      Electronically signed by: Lisy Bolanos MD  Date:    04/17/2019  Time:    15:32             Narrative:    EXAMINATION:  XR CHEST AP PORTABLE    CLINICAL HISTORY:  Shortness of breath,    COMPARISON:  Comparison 03/27/2019    FINDINGS:  Prior median sternotomy.  Stable cardiomegaly.  Prosthetic cardiac valve.  Persistent area of hazy opacification in the lateral aspect of the mid right lung.  Left lung remains clear.                                 The EKG was ordered, reviewed, and independently interpreted by the ED provider.  Interpretation time: 8087  Rate: 90 BPM  Rhythm: normal sinus rhythm  Interpretation:  Left axis deviation. Nonspecific intraventricular block. T wave abnormality, consider lateral ischemia. No STEMI.  No change from 3/27/19.         The Emergency Provider reviewed the vital signs and test results, which are outlined above.     ED Discussion     3:45 PM: Dr. Morgan discussed the pt's case extensively with Dr. Hurst (Nephrology) who states pt has a hx of pulmonary hypertension that may be causing his chronic SOB. He recommends out-pt f/u with cardiology and pulmonology.    3:52 PM: Reassessed pt at this time. Patient is awake, alert, and in NAD. Pt states his condition has improved at this time. Discussed with pt all pertinent ED information and results. Discussed pt dx and plan of tx. Gave pt all f/u and return to the ED instructions. All questions and concerns were addressed at this time. Pt expresses understanding of information and instructions, and is comfortable with plan to discharge. Pt is stable for discharge.    3:59 PM: Dr. Hurst states he has set up nighttime dialysis for pt starting Friday. Pt is stable for discharge.    I discussed with patient and/or family/caretaker that evaluation in the ED does not suggest any emergent or life threatening medical conditions requiring immediate intervention beyond what was provided in the ED, and I believe patient is safe for discharge.  Regardless, an unremarkable evaluation in the ED does not preclude the development or presence of a serious of life threatening condition. As such, patient was instructed to return immediately for any worsening or change in current symptoms.    ED Medication(s):  Medications - No data to display    New Prescriptions    No medications on file                 Medical Decision Making:   Clinical Tests:   Lab Tests: Ordered and Reviewed  Radiological Study: Ordered and Reviewed  Medical Tests: Ordered and Reviewed             Scribe Attestation:   Scribe #1: I performed the above scribed service and the  documentation accurately describes the services I performed. I attest to the accuracy of the note.     Attending:   Physician Attestation Statement for Scribe #1: I, Cathy Morgan MD, personally performed the services described in this documentation, as scribed by Shamika Mays/Addie Rivas, in my presence, and it is both accurate and complete.           Clinical Impression       ICD-10-CM ICD-9-CM   1. ESRD (end stage renal disease) on dialysis N18.6 585.6    Z99.2 V45.11   2. Shortness of breath R06.02 786.05   3. Dyspnea, unspecified type R06.00 786.09   4. Chronic anemia D64.9 285.9       Disposition:   Disposition: Discharged  Condition: Stable           Cathy Morgan MD  04/17/19 5528

## 2019-04-17 NOTE — TELEPHONE ENCOUNTER
----- Message from Analilia Kurtz sent at 4/17/2019  9:54 AM CDT -----  Please place order for colonoscopy in [place].    Analilia

## 2019-04-22 RX ORDER — HYDROXYZINE HYDROCHLORIDE 25 MG/1
TABLET, FILM COATED ORAL
Qty: 30 TABLET | Refills: 8 | Status: SHIPPED | OUTPATIENT
Start: 2019-04-22 | End: 2019-10-01 | Stop reason: SDUPTHER

## 2019-04-28 ENCOUNTER — HOSPITAL ENCOUNTER (INPATIENT)
Facility: HOSPITAL | Age: 49
LOS: 3 days | Discharge: HOME | DRG: 193 | End: 2019-05-01
Attending: EMERGENCY MEDICINE | Admitting: HOSPITALIST
Payer: COMMERCIAL

## 2019-04-28 DIAGNOSIS — R76.11 PPD POSITIVE: ICD-10-CM

## 2019-04-28 DIAGNOSIS — J18.9 PNEUMONIA: ICD-10-CM

## 2019-04-28 DIAGNOSIS — Z99.2 ESRD (END STAGE RENAL DISEASE) ON DIALYSIS: Primary | ICD-10-CM

## 2019-04-28 DIAGNOSIS — R91.8 MULTIPLE LUNG NODULES ON CT: ICD-10-CM

## 2019-04-28 DIAGNOSIS — N18.6 ESRD (END STAGE RENAL DISEASE) ON DIALYSIS: Primary | ICD-10-CM

## 2019-04-28 DIAGNOSIS — I11.0 HYPERTENSIVE CARDIOMEGALY WITH HEART FAILURE: ICD-10-CM

## 2019-04-28 DIAGNOSIS — R06.02 SOB (SHORTNESS OF BREATH): ICD-10-CM

## 2019-04-28 DIAGNOSIS — I38 ENDOCARDITIS: ICD-10-CM

## 2019-04-28 LAB
ALBUMIN SERPL BCP-MCNC: 3.3 G/DL (ref 3.5–5.2)
ALP SERPL-CCNC: 76 U/L (ref 55–135)
ALT SERPL W/O P-5'-P-CCNC: 37 U/L (ref 10–44)
ANION GAP SERPL CALC-SCNC: 14 MMOL/L (ref 8–16)
APTT BLDCRRT: 31.7 SEC (ref 21–32)
AST SERPL-CCNC: 39 U/L (ref 10–40)
BASOPHILS # BLD AUTO: 0 K/UL (ref 0–0.2)
BASOPHILS NFR BLD: 0 % (ref 0–1.9)
BILIRUB SERPL-MCNC: 0.5 MG/DL (ref 0.1–1)
BUN SERPL-MCNC: 59 MG/DL (ref 6–20)
CALCIUM SERPL-MCNC: 10.1 MG/DL (ref 8.7–10.5)
CHLORIDE SERPL-SCNC: 97 MMOL/L (ref 95–110)
CO2 SERPL-SCNC: 26 MMOL/L (ref 23–29)
CREAT SERPL-MCNC: 13.3 MG/DL (ref 0.5–1.4)
DIFFERENTIAL METHOD: ABNORMAL
EOSINOPHIL # BLD AUTO: 0.5 K/UL (ref 0–0.5)
EOSINOPHIL NFR BLD: 8.9 % (ref 0–8)
ERYTHROCYTE [DISTWIDTH] IN BLOOD BY AUTOMATED COUNT: 14.5 % (ref 11.5–14.5)
EST. GFR  (AFRICAN AMERICAN): 4 ML/MIN/1.73 M^2
EST. GFR  (NON AFRICAN AMERICAN): 4 ML/MIN/1.73 M^2
GLUCOSE SERPL-MCNC: 87 MG/DL (ref 70–110)
HCT VFR BLD AUTO: 24.7 % (ref 40–54)
HGB BLD-MCNC: 8.2 G/DL (ref 14–18)
INR PPP: 1.1 (ref 0.8–1.2)
LACTATE SERPL-SCNC: 0.7 MMOL/L (ref 0.5–2.2)
LYMPHOCYTES # BLD AUTO: 1.2 K/UL (ref 1–4.8)
LYMPHOCYTES NFR BLD: 22.5 % (ref 18–48)
MCH RBC QN AUTO: 35.5 PG (ref 27–31)
MCHC RBC AUTO-ENTMCNC: 33.2 G/DL (ref 32–36)
MCV RBC AUTO: 107 FL (ref 82–98)
MONOCYTES # BLD AUTO: 0.5 K/UL (ref 0.3–1)
MONOCYTES NFR BLD: 8.2 % (ref 4–15)
NEUTROPHILS # BLD AUTO: 3.3 K/UL (ref 1.8–7.7)
NEUTROPHILS NFR BLD: 60.4 % (ref 38–73)
PLATELET # BLD AUTO: 111 K/UL (ref 150–350)
PMV BLD AUTO: 9.7 FL (ref 9.2–12.9)
POTASSIUM SERPL-SCNC: 4.4 MMOL/L (ref 3.5–5.1)
PROT SERPL-MCNC: 8.1 G/DL (ref 6–8.4)
PROTHROMBIN TIME: 12.2 SEC (ref 9–12.5)
RBC # BLD AUTO: 2.31 M/UL (ref 4.6–6.2)
SODIUM SERPL-SCNC: 137 MMOL/L (ref 136–145)
TROPONIN I SERPL DL<=0.01 NG/ML-MCNC: 0.08 NG/ML (ref 0–0.03)
WBC # BLD AUTO: 5.5 K/UL (ref 3.9–12.7)

## 2019-04-28 PROCEDURE — 83605 ASSAY OF LACTIC ACID: CPT | Mod: NTX

## 2019-04-28 PROCEDURE — 87040 BLOOD CULTURE FOR BACTERIA: CPT | Mod: 59,NTX

## 2019-04-28 PROCEDURE — 85730 THROMBOPLASTIN TIME PARTIAL: CPT | Mod: NTX

## 2019-04-28 PROCEDURE — 96365 THER/PROPH/DIAG IV INF INIT: CPT | Mod: NTX

## 2019-04-28 PROCEDURE — 96367 TX/PROPH/DG ADDL SEQ IV INF: CPT | Mod: NTX

## 2019-04-28 PROCEDURE — 85610 PROTHROMBIN TIME: CPT | Mod: NTX

## 2019-04-28 PROCEDURE — 93010 ELECTROCARDIOGRAM REPORT: CPT | Mod: NTX,,, | Performed by: INTERNAL MEDICINE

## 2019-04-28 PROCEDURE — 84484 ASSAY OF TROPONIN QUANT: CPT | Mod: NTX

## 2019-04-28 PROCEDURE — 11000001 HC ACUTE MED/SURG PRIVATE ROOM: Mod: NTX

## 2019-04-28 PROCEDURE — 99285 EMERGENCY DEPT VISIT HI MDM: CPT | Mod: 25,NTX

## 2019-04-28 PROCEDURE — 85025 COMPLETE CBC W/AUTO DIFF WBC: CPT | Mod: NTX

## 2019-04-28 PROCEDURE — 93010 EKG 12-LEAD: ICD-10-PCS | Mod: NTX,,, | Performed by: INTERNAL MEDICINE

## 2019-04-28 PROCEDURE — 82550 ASSAY OF CK (CPK): CPT | Mod: NTX

## 2019-04-28 PROCEDURE — 80053 COMPREHEN METABOLIC PANEL: CPT | Mod: NTX

## 2019-04-28 PROCEDURE — 84145 PROCALCITONIN (PCT): CPT | Mod: NTX

## 2019-04-28 PROCEDURE — 93005 ELECTROCARDIOGRAM TRACING: CPT | Mod: NTX

## 2019-04-29 PROBLEM — A15.9 TUBERCULOSIS: Status: ACTIVE | Noted: 2019-04-29

## 2019-04-29 LAB
ALBUMIN SERPL BCP-MCNC: 3.2 G/DL (ref 3.5–5.2)
ALP SERPL-CCNC: 75 U/L (ref 55–135)
ALT SERPL W/O P-5'-P-CCNC: 36 U/L (ref 10–44)
AMPHET+METHAMPHET UR QL: NEGATIVE
ANION GAP SERPL CALC-SCNC: 12 MMOL/L (ref 8–16)
APTT BLDCRRT: 32.5 SEC (ref 21–32)
APTT BLDCRRT: 32.5 SEC (ref 21–32)
AST SERPL-CCNC: 37 U/L (ref 10–40)
BACTERIA #/AREA URNS HPF: NORMAL /HPF
BARBITURATES UR QL SCN>200 NG/ML: NEGATIVE
BASOPHILS # BLD AUTO: 0.01 K/UL (ref 0–0.2)
BASOPHILS NFR BLD: 0.2 % (ref 0–1.9)
BENZODIAZ UR QL SCN>200 NG/ML: NEGATIVE
BILIRUB SERPL-MCNC: 0.6 MG/DL (ref 0.1–1)
BILIRUB UR QL STRIP: NEGATIVE
BUN SERPL-MCNC: 59 MG/DL (ref 6–20)
BZE UR QL SCN: NEGATIVE
CALCIUM SERPL-MCNC: 10.2 MG/DL (ref 8.7–10.5)
CANNABINOIDS UR QL SCN: NEGATIVE
CHLORIDE SERPL-SCNC: 98 MMOL/L (ref 95–110)
CK SERPL-CCNC: 505 U/L (ref 20–200)
CLARITY UR: CLEAR
CO2 SERPL-SCNC: 28 MMOL/L (ref 23–29)
COLOR UR: YELLOW
CREAT SERPL-MCNC: 13.9 MG/DL (ref 0.5–1.4)
CREAT UR-MCNC: 44.5 MG/DL (ref 23–375)
CRYPTOC AG SER QL LA: NEGATIVE
DIFFERENTIAL METHOD: ABNORMAL
EOSINOPHIL # BLD AUTO: 0.5 K/UL (ref 0–0.5)
EOSINOPHIL NFR BLD: 8.5 % (ref 0–8)
ERYTHROCYTE [DISTWIDTH] IN BLOOD BY AUTOMATED COUNT: 14.5 % (ref 11.5–14.5)
EST. GFR  (AFRICAN AMERICAN): 4 ML/MIN/1.73 M^2
EST. GFR  (NON AFRICAN AMERICAN): 4 ML/MIN/1.73 M^2
ESTIMATED AVG GLUCOSE: 80 MG/DL (ref 68–131)
GLUCOSE SERPL-MCNC: 93 MG/DL (ref 70–110)
GLUCOSE UR QL STRIP: NEGATIVE
HBA1C MFR BLD HPLC: 4.4 % (ref 4–5.6)
HCT VFR BLD AUTO: 24.9 % (ref 40–54)
HGB BLD-MCNC: 8.2 G/DL (ref 14–18)
HGB UR QL STRIP: ABNORMAL
HIV 1+2 AB+HIV1 P24 AG SERPL QL IA: NEGATIVE
HYALINE CASTS #/AREA URNS LPF: 0 /LPF
INR PPP: 1.1 (ref 0.8–1.2)
INR PPP: 1.1 (ref 0.8–1.2)
KETONES UR QL STRIP: NEGATIVE
LACTATE SERPL-SCNC: 0.6 MMOL/L (ref 0.5–2.2)
LEUKOCYTE ESTERASE UR QL STRIP: NEGATIVE
LYMPHOCYTES # BLD AUTO: 0.8 K/UL (ref 1–4.8)
LYMPHOCYTES NFR BLD: 14.3 % (ref 18–48)
MAGNESIUM SERPL-MCNC: 2.2 MG/DL (ref 1.6–2.6)
MCH RBC QN AUTO: 35.7 PG (ref 27–31)
MCHC RBC AUTO-ENTMCNC: 32.9 G/DL (ref 32–36)
MCV RBC AUTO: 108 FL (ref 82–98)
METHADONE UR QL SCN>300 NG/ML: NEGATIVE
MICROSCOPIC COMMENT: NORMAL
MONOCYTES # BLD AUTO: 1.1 K/UL (ref 0.3–1)
MONOCYTES NFR BLD: 18.5 % (ref 4–15)
NEUTROPHILS # BLD AUTO: 3.3 K/UL (ref 1.8–7.7)
NEUTROPHILS NFR BLD: 58.5 % (ref 38–73)
NITRITE UR QL STRIP: NEGATIVE
OPIATES UR QL SCN: NEGATIVE
PCP UR QL SCN>25 NG/ML: NEGATIVE
PH UR STRIP: >8 [PH] (ref 5–8)
PHOSPHATE SERPL-MCNC: 3.2 MG/DL (ref 2.7–4.5)
PLATELET # BLD AUTO: 103 K/UL (ref 150–350)
PMV BLD AUTO: 9.7 FL (ref 9.2–12.9)
POTASSIUM SERPL-SCNC: 4.4 MMOL/L (ref 3.5–5.1)
PROCALCITONIN SERPL IA-MCNC: 1.06 NG/ML
PROT SERPL-MCNC: 8.1 G/DL (ref 6–8.4)
PROT UR QL STRIP: ABNORMAL
PROTHROMBIN TIME: 12.3 SEC (ref 9–12.5)
PROTHROMBIN TIME: 12.4 SEC (ref 9–12.5)
RBC # BLD AUTO: 2.3 M/UL (ref 4.6–6.2)
RBC #/AREA URNS HPF: 1 /HPF (ref 0–4)
SODIUM SERPL-SCNC: 138 MMOL/L (ref 136–145)
SP GR UR STRIP: 1.01 (ref 1–1.03)
TOXICOLOGY INFORMATION: NORMAL
TROPONIN I SERPL DL<=0.01 NG/ML-MCNC: 0.08 NG/ML (ref 0–0.03)
URN SPEC COLLECT METH UR: ABNORMAL
UROBILINOGEN UR STRIP-ACNC: NEGATIVE EU/DL
WBC # BLD AUTO: 5.67 K/UL (ref 3.9–12.7)
WBC #/AREA URNS HPF: 0 /HPF (ref 0–5)

## 2019-04-29 PROCEDURE — 87385 HISTOPLASMA CAPSUL AG IA: CPT | Mod: NTX

## 2019-04-29 PROCEDURE — 86403 PARTICLE AGGLUT ANTBDY SCRN: CPT | Mod: NTX

## 2019-04-29 PROCEDURE — 87556 M.TUBERCULO DNA AMP PROBE: CPT | Mod: NTX

## 2019-04-29 PROCEDURE — 87070 CULTURE OTHR SPECIMN AEROBIC: CPT | Mod: NTX

## 2019-04-29 PROCEDURE — 84484 ASSAY OF TROPONIN QUANT: CPT | Mod: NTX

## 2019-04-29 PROCEDURE — 84100 ASSAY OF PHOSPHORUS: CPT | Mod: NTX

## 2019-04-29 PROCEDURE — 25500020 PHARM REV CODE 255: Mod: NTX | Performed by: INTERNAL MEDICINE

## 2019-04-29 PROCEDURE — 87449 NOS EACH ORGANISM AG IA: CPT | Mod: 91,NTX

## 2019-04-29 PROCEDURE — 25000003 PHARM REV CODE 250: Mod: NTX | Performed by: INTERNAL MEDICINE

## 2019-04-29 PROCEDURE — 87116 MYCOBACTERIA CULTURE: CPT | Mod: 59,NTX

## 2019-04-29 PROCEDURE — 94761 N-INVAS EAR/PLS OXIMETRY MLT: CPT | Mod: NTX

## 2019-04-29 PROCEDURE — 25000242 PHARM REV CODE 250 ALT 637 W/ HCPCS: Mod: NTX | Performed by: NURSE PRACTITIONER

## 2019-04-29 PROCEDURE — 25000003 PHARM REV CODE 250: Mod: NTX | Performed by: HOSPITALIST

## 2019-04-29 PROCEDURE — 99232 PR SUBSEQUENT HOSPITAL CARE,LEVL II: ICD-10-PCS | Mod: NTX,,, | Performed by: INTERNAL MEDICINE

## 2019-04-29 PROCEDURE — 85730 THROMBOPLASTIN TIME PARTIAL: CPT | Mod: 91,NTX

## 2019-04-29 PROCEDURE — 36415 COLL VENOUS BLD VENIPUNCTURE: CPT | Mod: NTX

## 2019-04-29 PROCEDURE — 80053 COMPREHEN METABOLIC PANEL: CPT | Mod: NTX

## 2019-04-29 PROCEDURE — 63600175 PHARM REV CODE 636 W HCPCS: Mod: JG,NTX | Performed by: INTERNAL MEDICINE

## 2019-04-29 PROCEDURE — 63600175 PHARM REV CODE 636 W HCPCS: Mod: NTX | Performed by: NURSE PRACTITIONER

## 2019-04-29 PROCEDURE — 87205 SMEAR GRAM STAIN: CPT | Mod: NTX

## 2019-04-29 PROCEDURE — 25000242 PHARM REV CODE 250 ALT 637 W/ HCPCS: Mod: NTX | Performed by: HOSPITALIST

## 2019-04-29 PROCEDURE — 99232 SBSQ HOSP IP/OBS MODERATE 35: CPT | Mod: NTX,,, | Performed by: INTERNAL MEDICINE

## 2019-04-29 PROCEDURE — 85025 COMPLETE CBC W/AUTO DIFF WBC: CPT | Mod: NTX

## 2019-04-29 PROCEDURE — 85730 THROMBOPLASTIN TIME PARTIAL: CPT | Mod: NTX

## 2019-04-29 PROCEDURE — 81000 URINALYSIS NONAUTO W/SCOPE: CPT | Mod: NTX,59

## 2019-04-29 PROCEDURE — 80100016 HC MAINTENANCE HEMODIALYSIS: Mod: NTX

## 2019-04-29 PROCEDURE — 21400001 HC TELEMETRY ROOM: Mod: NTX

## 2019-04-29 PROCEDURE — 63600175 PHARM REV CODE 636 W HCPCS: Mod: NTX | Performed by: EMERGENCY MEDICINE

## 2019-04-29 PROCEDURE — 25000003 PHARM REV CODE 250: Mod: NTX | Performed by: EMERGENCY MEDICINE

## 2019-04-29 PROCEDURE — 83605 ASSAY OF LACTIC ACID: CPT | Mod: NTX

## 2019-04-29 PROCEDURE — 86480 TB TEST CELL IMMUN MEASURE: CPT | Mod: NTX

## 2019-04-29 PROCEDURE — 80307 DRUG TEST PRSMV CHEM ANLYZR: CPT | Mod: NTX

## 2019-04-29 PROCEDURE — C9113 INJ PANTOPRAZOLE SODIUM, VIA: HCPCS | Mod: NTX | Performed by: HOSPITALIST

## 2019-04-29 PROCEDURE — 27000221 HC OXYGEN, UP TO 24 HOURS: Mod: NTX

## 2019-04-29 PROCEDURE — 63600175 PHARM REV CODE 636 W HCPCS: Mod: NTX | Performed by: HOSPITALIST

## 2019-04-29 PROCEDURE — 86703 HIV-1/HIV-2 1 RESULT ANTBDY: CPT | Mod: NTX

## 2019-04-29 PROCEDURE — 83036 HEMOGLOBIN GLYCOSYLATED A1C: CPT | Mod: NTX

## 2019-04-29 PROCEDURE — 83735 ASSAY OF MAGNESIUM: CPT | Mod: NTX

## 2019-04-29 PROCEDURE — 86635 COCCIDIOIDES ANTIBODY: CPT | Mod: NTX

## 2019-04-29 PROCEDURE — 94640 AIRWAY INHALATION TREATMENT: CPT | Mod: NTX

## 2019-04-29 PROCEDURE — 87102 FUNGUS ISOLATION CULTURE: CPT | Mod: NTX

## 2019-04-29 PROCEDURE — 25000003 PHARM REV CODE 250: Mod: NTX | Performed by: NURSE PRACTITIONER

## 2019-04-29 PROCEDURE — 85610 PROTHROMBIN TIME: CPT | Mod: NTX

## 2019-04-29 PROCEDURE — 85610 PROTHROMBIN TIME: CPT | Mod: 91,NTX

## 2019-04-29 PROCEDURE — 87206 SMEAR FLUORESCENT/ACID STAI: CPT | Mod: NTX

## 2019-04-29 PROCEDURE — 87449 NOS EACH ORGANISM AG IA: CPT | Mod: NTX

## 2019-04-29 PROCEDURE — 87015 SPECIMEN INFECT AGNT CONCNTJ: CPT | Mod: 59,NTX

## 2019-04-29 RX ORDER — FUROSEMIDE 80 MG/1
80 TABLET ORAL DAILY
Status: DISCONTINUED | OUTPATIENT
Start: 2019-04-29 | End: 2019-05-01 | Stop reason: HOSPADM

## 2019-04-29 RX ORDER — DOXYCYCLINE HYCLATE 100 MG
100 TABLET ORAL EVERY 12 HOURS
Status: DISCONTINUED | OUTPATIENT
Start: 2019-04-29 | End: 2019-04-29

## 2019-04-29 RX ORDER — GLUCAGON 1 MG
1 KIT INJECTION
Status: DISCONTINUED | OUTPATIENT
Start: 2019-04-29 | End: 2019-05-01 | Stop reason: HOSPADM

## 2019-04-29 RX ORDER — ARFORMOTEROL TARTRATE 15 UG/2ML
15 SOLUTION RESPIRATORY (INHALATION) EVERY 12 HOURS
Status: DISCONTINUED | OUTPATIENT
Start: 2019-04-29 | End: 2019-05-01 | Stop reason: HOSPADM

## 2019-04-29 RX ORDER — HEPARIN SODIUM 1000 [USP'U]/ML
3000 INJECTION, SOLUTION INTRAVENOUS; SUBCUTANEOUS
Status: DISCONTINUED | OUTPATIENT
Start: 2019-04-29 | End: 2019-05-01 | Stop reason: HOSPADM

## 2019-04-29 RX ORDER — IPRATROPIUM BROMIDE AND ALBUTEROL SULFATE 2.5; .5 MG/3ML; MG/3ML
3 SOLUTION RESPIRATORY (INHALATION) EVERY 6 HOURS
Status: DISCONTINUED | OUTPATIENT
Start: 2019-04-29 | End: 2019-05-01 | Stop reason: HOSPADM

## 2019-04-29 RX ORDER — ONDANSETRON 2 MG/ML
4 INJECTION INTRAMUSCULAR; INTRAVENOUS EVERY 8 HOURS PRN
Status: DISCONTINUED | OUTPATIENT
Start: 2019-04-29 | End: 2019-05-01 | Stop reason: HOSPADM

## 2019-04-29 RX ORDER — BUDESONIDE 0.5 MG/2ML
0.5 INHALANT ORAL EVERY 12 HOURS
Status: DISCONTINUED | OUTPATIENT
Start: 2019-04-29 | End: 2019-05-01 | Stop reason: HOSPADM

## 2019-04-29 RX ORDER — IBUPROFEN 200 MG
24 TABLET ORAL
Status: DISCONTINUED | OUTPATIENT
Start: 2019-04-29 | End: 2019-05-01 | Stop reason: HOSPADM

## 2019-04-29 RX ORDER — AMLODIPINE BESYLATE 10 MG/1
10 TABLET ORAL DAILY
Status: DISCONTINUED | OUTPATIENT
Start: 2019-04-29 | End: 2019-05-01 | Stop reason: HOSPADM

## 2019-04-29 RX ORDER — PANTOPRAZOLE SODIUM 40 MG/10ML
40 INJECTION, POWDER, LYOPHILIZED, FOR SOLUTION INTRAVENOUS DAILY
Status: DISCONTINUED | OUTPATIENT
Start: 2019-04-29 | End: 2019-05-01 | Stop reason: HOSPADM

## 2019-04-29 RX ORDER — WARFARIN SODIUM 5 MG/1
5 TABLET ORAL DAILY
Status: DISCONTINUED | OUTPATIENT
Start: 2019-04-29 | End: 2019-04-29

## 2019-04-29 RX ORDER — ACETAMINOPHEN 325 MG/1
650 TABLET ORAL EVERY 6 HOURS PRN
Status: DISCONTINUED | OUTPATIENT
Start: 2019-04-29 | End: 2019-05-01 | Stop reason: HOSPADM

## 2019-04-29 RX ORDER — DILTIAZEM HYDROCHLORIDE 180 MG/1
360 CAPSULE, COATED, EXTENDED RELEASE ORAL DAILY
Status: DISCONTINUED | OUTPATIENT
Start: 2019-04-29 | End: 2019-05-01 | Stop reason: HOSPADM

## 2019-04-29 RX ORDER — ENOXAPARIN SODIUM 100 MG/ML
1 INJECTION SUBCUTANEOUS
Status: DISCONTINUED | OUTPATIENT
Start: 2019-04-29 | End: 2019-04-30

## 2019-04-29 RX ORDER — SODIUM CHLORIDE 0.9 % (FLUSH) 0.9 %
5 SYRINGE (ML) INJECTION
Status: DISCONTINUED | OUTPATIENT
Start: 2019-04-29 | End: 2019-05-01 | Stop reason: HOSPADM

## 2019-04-29 RX ORDER — HEPARIN SODIUM,PORCINE/D5W 25000/250
12 INTRAVENOUS SOLUTION INTRAVENOUS CONTINUOUS
Status: DISCONTINUED | OUTPATIENT
Start: 2019-04-29 | End: 2019-04-30

## 2019-04-29 RX ORDER — PROMETHAZINE HYDROCHLORIDE AND CODEINE PHOSPHATE 6.25; 1 MG/5ML; MG/5ML
5 SOLUTION ORAL ONCE
Status: COMPLETED | OUTPATIENT
Start: 2019-04-29 | End: 2019-04-29

## 2019-04-29 RX ORDER — VANCOMYCIN HCL IN 5 % DEXTROSE 1.5G/250ML
1500 PLASTIC BAG, INJECTION (ML) INTRAVENOUS
Status: DISCONTINUED | OUTPATIENT
Start: 2019-05-06 | End: 2019-05-01

## 2019-04-29 RX ORDER — IBUPROFEN 200 MG
16 TABLET ORAL
Status: DISCONTINUED | OUTPATIENT
Start: 2019-04-29 | End: 2019-05-01 | Stop reason: HOSPADM

## 2019-04-29 RX ORDER — HEPARIN SODIUM,PORCINE/D5W 25000/250
12 INTRAVENOUS SOLUTION INTRAVENOUS CONTINUOUS
Status: DISCONTINUED | OUTPATIENT
Start: 2019-04-29 | End: 2019-04-29

## 2019-04-29 RX ORDER — HEPARIN SODIUM 5000 [USP'U]/ML
5000 INJECTION, SOLUTION INTRAVENOUS; SUBCUTANEOUS EVERY 8 HOURS
Status: DISCONTINUED | OUTPATIENT
Start: 2019-04-29 | End: 2019-04-29

## 2019-04-29 RX ORDER — IPRATROPIUM BROMIDE AND ALBUTEROL SULFATE 2.5; .5 MG/3ML; MG/3ML
3 SOLUTION RESPIRATORY (INHALATION) EVERY 6 HOURS PRN
Status: DISCONTINUED | OUTPATIENT
Start: 2019-04-29 | End: 2019-04-29

## 2019-04-29 RX ORDER — VORICONAZOLE 40 MG/ML
200 POWDER, FOR SUSPENSION ORAL EVERY 12 HOURS
Status: DISCONTINUED | OUTPATIENT
Start: 2019-04-29 | End: 2019-05-01

## 2019-04-29 RX ORDER — BENZONATATE 100 MG/1
100 CAPSULE ORAL 3 TIMES DAILY PRN
Status: DISCONTINUED | OUTPATIENT
Start: 2019-04-29 | End: 2019-05-01 | Stop reason: HOSPADM

## 2019-04-29 RX ADMIN — FUROSEMIDE 80 MG: 80 TABLET ORAL at 08:04

## 2019-04-29 RX ADMIN — ACETAMINOPHEN 650 MG: 325 TABLET ORAL at 08:04

## 2019-04-29 RX ADMIN — DOXYCYCLINE HYCLATE 100 MG: 100 TABLET, COATED ORAL at 08:04

## 2019-04-29 RX ADMIN — IPRATROPIUM BROMIDE AND ALBUTEROL SULFATE 3 ML: .5; 3 SOLUTION RESPIRATORY (INHALATION) at 07:04

## 2019-04-29 RX ADMIN — PROMETHAZINE HYDROCHLORIDE AND CODEINE PHOSPHATE 5 ML: 10; 6.25 SOLUTION ORAL at 01:04

## 2019-04-29 RX ADMIN — WARFARIN SODIUM 7.5 MG: 2.5 TABLET ORAL at 04:04

## 2019-04-29 RX ADMIN — DOXYCYCLINE HYCLATE 100 MG: 100 TABLET, COATED ORAL at 02:04

## 2019-04-29 RX ADMIN — IOHEXOL 75 ML: 350 INJECTION, SOLUTION INTRAVENOUS at 07:04

## 2019-04-29 RX ADMIN — BENZONATATE 100 MG: 100 CAPSULE ORAL at 12:04

## 2019-04-29 RX ADMIN — PANTOPRAZOLE SODIUM 40 MG: 40 INJECTION, POWDER, LYOPHILIZED, FOR SOLUTION INTRAVENOUS at 08:04

## 2019-04-29 RX ADMIN — VANCOMYCIN HYDROCHLORIDE 2500 MG: 1 INJECTION, POWDER, FOR SOLUTION INTRAVENOUS at 02:04

## 2019-04-29 RX ADMIN — ERYTHROPOIETIN 10000 UNITS: 10000 INJECTION, SOLUTION INTRAVENOUS; SUBCUTANEOUS at 10:04

## 2019-04-29 RX ADMIN — BUDESONIDE 0.5 MG: 0.5 SUSPENSION RESPIRATORY (INHALATION) at 08:04

## 2019-04-29 RX ADMIN — AMLODIPINE BESYLATE 10 MG: 10 TABLET ORAL at 08:04

## 2019-04-29 RX ADMIN — BUDESONIDE 0.5 MG: 0.5 SUSPENSION RESPIRATORY (INHALATION) at 07:04

## 2019-04-29 RX ADMIN — IPRATROPIUM BROMIDE AND ALBUTEROL SULFATE 3 ML: .5; 3 SOLUTION RESPIRATORY (INHALATION) at 02:04

## 2019-04-29 RX ADMIN — PIPERACILLIN AND TAZOBACTAM 4.5 G: 4; .5 INJECTION, POWDER, LYOPHILIZED, FOR SOLUTION INTRAVENOUS; PARENTERAL at 01:04

## 2019-04-29 RX ADMIN — VORICONAZOLE 200 MG: 40 POWDER, FOR SUSPENSION ORAL at 10:04

## 2019-04-29 RX ADMIN — DILTIAZEM HYDROCHLORIDE 360 MG: 180 CAPSULE, COATED, EXTENDED RELEASE ORAL at 08:04

## 2019-04-29 RX ADMIN — HEPARIN SODIUM AND DEXTROSE 12 UNITS/KG/HR: 10000; 5 INJECTION INTRAVENOUS at 06:04

## 2019-04-29 RX ADMIN — ACETAMINOPHEN 650 MG: 325 TABLET ORAL at 04:04

## 2019-04-29 RX ADMIN — ARFORMOTEROL TARTRATE 15 MCG: 15 SOLUTION RESPIRATORY (INHALATION) at 08:04

## 2019-04-29 RX ADMIN — ARFORMOTEROL TARTRATE 15 MCG: 15 SOLUTION RESPIRATORY (INHALATION) at 07:04

## 2019-04-29 RX ADMIN — PIPERACILLIN AND TAZOBACTAM 4.5 G: 4; .5 INJECTION, POWDER, LYOPHILIZED, FOR SOLUTION INTRAVENOUS; PARENTERAL at 12:04

## 2019-04-29 NOTE — HPI
49 year old male who  has a past medical history of Aortic valve stenosis, Atrial fibrillation, Atrial flutter, Cardiomyopathy, CHF (congestive heart failure), Drug-induced erectile dysfunction, ESRD due to hypertension, ESRD on dialysis, Hepatitis B, Hyperlipidemia, Hypertension, Secondary hyperparathyroidism of renal origin, Supraventricular tachycardia, Tachycardia, and Valvular regurgitation admitted for pneumonia. Pulmonary consulted to assist with management.

## 2019-04-29 NOTE — ASSESSMENT & PLAN NOTE
- Findings on CT suggestive of  atypical infection, fungal infection or neoplasm.   - check resp cx, fungal cx, AFB sputum q8h x 3, gold quantiferon, fungitell, cryptococcus serum Ag, histo ag, TB PCR, cocciodo Ab, urine strep Ag  - continue zosyn/vancomycin, add doxycycline for atypical coverage  - duonebs prn sob/wheezes

## 2019-04-29 NOTE — ASSESSMENT & PLAN NOTE
- continue amlodipine, dilitazem  - hold lisinopril for now, restart if okay with nephrology and for BP coverage

## 2019-04-29 NOTE — H&P
[facility] Hospital Medicine  History & Physical    Patient Name: Isidro White Jr.  MRN: [id number]  Admission Date: 4/28/2019  Attending Physician: Valdemar Piper MD   Primary Care Provider: Jacquelyn Skelton MD         Patient information was obtained from patient and ER records.     Subjective:     Principal Problem:Pneumonia    Chief Complaint:   Chief Complaint   Patient presents with    Shortness of Breath     dx with pneumonia 2 weeks ago, states completed antibiotics and symptoms are not better        HPI: 49M h/o AS s/p AVR, ESRD, hepatitis B, and Afib presents with SOB for past 2-3 weeks.  Associated with productive cough, hemoptysis, cold sweats, fevers and chills.    Patient denies any CP, palpations, HA, lightheadedness, dizziness, numbness, localized weakness, rhinorrhea, dysuria, hematurias, abdominal pain, n/v, sinus pain, ear pain, and all other sxs at this time. No further complaints or concerns at this time.   Patient had seen PCP in clinic and PPD injected 5 days ago in right forearm that was >1cm induration.   In ER, CT chest without contrast statrad show  cluster of  tiny subsegmental nodules inferior right upper lobe and multiple clusters within the right middle lobe and right lower lobes, largest single nodule measures 13.5 mm in the inferior right lower lobe, multiple subcentimeter nodules left lower lobe,  larger nodules or clusters of nodules demonstrate surrounding groundglass halos consistent with a small amount of adjacent hemorrhage.  Patient given 4.5mg IV zosyn and 2.5g vancomycin IV.  Hospital medicine called for admission.      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     HD M, W, F    ESRD on dialysis     Hepatitis B     Hyperlipidemia      Hypertension     Secondary hyperparathyroidism of renal origin     Supraventricular tachycardia     atrial tachycardia    Tachycardia     Valvular regurgitation     AI, TR       Past Surgical History:   Procedure Laterality Date    ABLATION N/A 3/13/2017    Performed by Nigel Guallpa MD at [facility] CATH LAB    ASD REPAIR      age 8 [facility]    AV Graft Creation Left 03/2017    CARDIAC CATHETERIZATION      [facility]     CARDIAC VALVE SURGERY  02/08/2017    22 mm Medtronic AV, 28 mm TV Medtronic annuloplaty ring    [id number]-DUCDY-WOILRIOXIJEEZ Accuseal Left 3/16/2017    Performed by Alcides Manning MD at [facility] OR 2ND FLR    RADIOFREQUENCY ABLATION  03/13/2017    Atrial tachycardia    REDO STERNOTOMY WITH AORTIC VALVE REPLACEMENT/redo sternotomy N/A 2/8/2017    Performed by Jose Gastelum MD at [facility] OR 2ND FLR    REPAIR-VALVE-TRICUSPID  2/8/2017    Performed by Jose Gastelum MD at [facility] OR 2ND FLR    REPLACEMENT-VALVE-AORTIC N/A 2/8/2017    Performed by Jose Gastelmu MD at [facility] OR [facility] FLR       Review of patient's allergies indicates:  No Known Allergies    No current facility-administered medications on file prior to encounter.      Current Outpatient Medications on File Prior to Encounter   Medication Sig    amLODIPine (NORVASC) 10 MG tablet Take 1 tablet (10 mg total) by mouth once daily.    amoxicillin-clavulanate 875-125mg (AUGMENTIN) 875-125 mg per tablet     ascorbic acid, vitamin C, (VITAMIN C) 500 MG tablet Take 1 tablet (500 mg total) by mouth 2 (two) times daily.    b complex vitamins tablet Take 1 tablet by mouth once daily.    calcium acetate (PHOSLO) 667 mg capsule Take 1 capsule (667 mg total) by mouth 3 (three) times daily.    diltiaZEM (CARDIZEM CD) 360 MG 24 hr capsule Take 1 capsule (360 mg total) by mouth once daily.    epoetin vikki (PROCRIT) 10,000 unit/mL injection Inject 1 mL (10,000 Units total) into the skin every Mon, Wed, Fri. To be given with HD     furosemide (LASIX) 80 MG tablet TAKE ONE TABLET BY MOUTH EVERY DAY    ipratropium-albuterol (COMBIVENT)  mcg/actuation inhaler Inhale 1 puff into the lungs every 4 (four) hours as needed for Wheezing or Shortness of Breath. Rescue    lisinopril (PRINIVIL,ZESTRIL) 40 MG tablet TAKE ONE TABLET BY MOUTH EVERY DAY    multivitamin with minerals tablet Take 1 tablet by mouth once daily.    pantoprazole (PROTONIX) 40 MG tablet TAKE ONE TABLET BY MOUTH EVERY DAY    ETHAN-RACQUEL RX 1- mg-mg-mcg Tab TAKE ONE TABLET BY MOUTH EVERY DAY    tenofovir (VIREAD) 300 mg Tab Take 1 tablet (300 mg total) by mouth once a week. AFTER DIALYSIS    hydrOXYzine HCl (ATARAX) 25 MG tablet TAKE 1 TABLET BY MOUTH DAILY AS NEEDED FOR ITCHING.    labetalol (NORMODYNE) 100 MG tablet TAKE ONE TABLET BY MOUTH THREE TIMES DAILY AS NEEDED IF HEART RATE GREATER THAN 120    omega-3 fatty acids-vitamin E (FISH OIL) 1,000 mg Cap Take 1 capsule by mouth once daily.    warfarin (COUMADIN) 7.5 MG tablet Take 1 tablet (7.5 mg total) by mouth Daily.    zolpidem (AMBIEN) 5 MG Tab TAKE ONE TABLET BY MOUTH EACH EVENING AT BEDTIME AS NEEDED FOR INSOMNIA     Family History     Problem Relation (Age of Onset)    Anesthesia problems Paternal Uncle    Diabetes Paternal Aunt, Paternal Aunt    Heart attack Father    Heart failure Paternal Grandmother    Hypertension Paternal Aunt    Leukemia Mother, Paternal Aunt    No Known Problems Sister, Brother, Sister, Sister    Stroke Paternal Aunt    Suicide Paternal Uncle    Valvular heart disease Maternal Aunt        Tobacco Use    Smoking status: Never Smoker    Smokeless tobacco: Never Used   Substance and Sexual Activity    Alcohol use: No     Frequency: Never     Comment: quit in 2016; does have heavy drinking history; started drinking at age 13; was drinking a 12 pack over the weekend and two 24 ounces of beer a day during the week along with a pint of hard alcohol    Drug use: No    Sexual  activity: Not on file     Review of Systems   Constitutional: Positive for chills, fatigue and fever. Negative for activity change, appetite change and diaphoresis.   HENT: Negative for facial swelling, sore throat, tinnitus and trouble swallowing.    Eyes: Negative for photophobia and visual disturbance.   Respiratory: Positive for cough and shortness of breath. Negative for apnea, chest tightness and wheezing.    Cardiovascular: Negative for chest pain, palpitations and leg swelling.   Gastrointestinal: Negative for abdominal distention, abdominal pain, constipation, diarrhea, nausea and vomiting.   Endocrine: Negative for polydipsia, polyphagia and polyuria.   Genitourinary: Negative for decreased urine volume, dysuria, flank pain, frequency and hematuria.   Musculoskeletal: Negative for arthralgias, back pain, joint swelling, myalgias and neck stiffness.   Skin: Negative for pallor and rash.   Allergic/Immunologic: Negative for immunocompromised state.   Neurological: Positive for weakness. Negative for dizziness, seizures, syncope, numbness and headaches.   Psychiatric/Behavioral: Negative for confusion, hallucinations and suicidal ideas. The patient is not nervous/anxious.    All other systems reviewed and are negative.    Objective:     Vital Signs (Most Recent):  Temp: 99.6 °F (37.6 °C) (04/29/19 0155)  Pulse: 96 (04/29/19 0218)  Resp: (!) 23 (04/29/19 0218)  BP: (!) 147/71 (04/29/19 0218)  SpO2: 95 % (04/29/19 0218) Vital Signs (24h Range):  Temp:  [99.6 °F (37.6 °C)-100.3 °F (37.9 °C)] 99.6 °F (37.6 °C)  Pulse:  [93-97] 96  Resp:  [20-23] 23  SpO2:  [89 %-98 %] 95 %  BP: (143-176)/(69-87) 147/71     Weight: 98.9 kg (218 lb)  Body mass index is 27.99 kg/m².    Physical Exam   Constitutional: He is oriented to person, place, and time. He appears well-developed and well-nourished. No distress.   HENT:   Head: Normocephalic and atraumatic.   Mouth/Throat: Oropharynx is clear and moist.   Eyes: Pupils are  equal, round, and reactive to light. Conjunctivae and EOM are normal. No scleral icterus.   Neck: Normal range of motion. Neck supple. No JVD present. No thyromegaly present.   Cardiovascular: Regular rhythm. Exam reveals no gallop and no friction rub.   Murmur heard.  tachycardia   Pulmonary/Chest: Effort normal. No respiratory distress. He has wheezes. He has no rales.   Abdominal: Soft. Bowel sounds are normal. He exhibits no distension. There is no tenderness. There is no guarding.   Musculoskeletal: Normal range of motion.   Neurological: He is oriented to person, place, and time. No cranial nerve deficit.   Skin: Skin is warm. Capillary refill takes less than 2 seconds. He is not diaphoretic. No erythema.   Nursing note and vitals reviewed.        CRANIAL NERVES     CN III, IV, VI   Pupils are equal, round, and reactive to light.  Extraocular motions are normal.        Significant Labs:   CBC:   Recent Labs   Lab 04/28/19  2306   WBC 5.50   HGB 8.2*   HCT 24.7*   *     CMP:   Recent Labs   Lab 04/28/19  2306      K 4.4   CL 97   CO2 26   GLU 87   BUN 59*   CREATININE 13.3*   CALCIUM 10.1   PROT 8.1   ALBUMIN 3.3*   BILITOT 0.5   ALKPHOS 76   AST 39   ALT 37   ANIONGAP 14   EGFRNONAA 4*     Lactic Acid:   Recent Labs   Lab 04/28/19  2325   LACTATE 0.7     Troponin:   Recent Labs   Lab 04/28/19  2306   TROPONINI 0.084*     Urine Studies: No results for input(s): COLORU, APPEARANCEUA, PHUR, SPECGRAV, PROTEINUA, GLUCUA, KETONESU, BILIRUBINUA, OCCULTUA, NITRITE, UROBILINOGEN, LEUKOCYTESUR, RBCUA, WBCUA, BACTERIA, SQUAMEPITHEL, HYALINECASTS in the last 48 hours.    Invalid input(s): WRIGHTSUR  All pertinent labs within the past 24 hours have been reviewed.    Significant Imaging: I have reviewed all pertinent imaging results/findings within the past 24 hours.   Imaging Results          CT Chest Without Contrast (In process)                X-Ray Chest AP Portable (Final result)  Result time 04/28/19  23:36:18    Final result by Christopher Sue Jr., MD (04/28/19 23:36:18)                 Impression:      Improved right mid lung infiltrate.  Cardiomegaly.      Electronically signed by: Christopher Sue Jr., MD  Date:    04/28/2019  Time:    23:36             Narrative:    EXAMINATION:  XR CHEST AP PORTABLE    CLINICAL HISTORY:  Sepsis;    COMPARISON:  04/17/2017    FINDINGS:  Mildly improved infiltrate in the right mid lung.  Remaining lungs are clear.  Cardiomegaly with evidence of sternotomy and valve replacement.  No effusion or pneumothorax..  The remaining osseous structures and soft tissues are within normal limits.                                  Assessment/Plan:     * Pneumonia  - Findings on CT suggestive of  atypical infection, fungal infection or neoplasm.   - check resp cx, fungal cx, AFB sputum q8h x 3, gold quantiferon, fungitell, cryptococcus serum Ag, histo ag, TB PCR, cocciodo Ab, urine strep Ag  - continue zosyn/vancomycin, add doxycycline for atypical coverage  - duonebs prn sob/wheezes        Atrial fibrillation  - continue diltiazem  - start heparin gtt        ESRD on dialysis  - consult nephrology in AM  - continue furosemide 80mg daily      S/P AVR (aortic valve replacement)  - INR 1.1  - will start heparin gtt to rebridge for warfarin and also possible need for bronchoscopy/other procedure      Essential hypertension  - continue amlodipine, dilitazem  - hold lisinopril for now, restart if okay with nephrology and for BP coverage        VTE Risk Mitigation (From admission, onward)        Ordered     Place FRANDY hose  Until discontinued      04/29/19 0223     Place sequential compression device  Until discontinued      04/29/19 0223     IP VTE HIGH RISK PATIENT  Once      04/29/19 0223             Valdemar Piper MD  Department of Hospital Medicine   [facility]

## 2019-04-29 NOTE — SUBJECTIVE & OBJECTIVE
Interval History:  Shortness of breath has improved approximately 60%.  Patient describes uncontrollable cough and requesting cough suppressant.    Review of Systems   Constitutional: Negative.  Negative for activity change, appetite change, chills, diaphoresis, fatigue and fever.   HENT: Negative.  Negative for congestion and trouble swallowing.    Eyes: Negative.    Respiratory: Positive for cough and shortness of breath. Negative for chest tightness and wheezing.         Hemoptysis   Cardiovascular: Positive for leg swelling. Negative for chest pain and palpitations.   Gastrointestinal: Negative.  Negative for abdominal distention, abdominal pain, nausea and vomiting.   Genitourinary: Negative.  Negative for decreased urine volume, difficulty urinating, dysuria, enuresis, flank pain, frequency, hematuria, penile swelling, scrotal swelling and urgency.   Musculoskeletal: Negative.  Negative for arthralgias, back pain, joint swelling and neck pain.   Skin: Negative for rash.   Neurological: Negative.  Negative for tremors, seizures and headaches.   Psychiatric/Behavioral: Negative.  Negative for confusion and sleep disturbance. The patient is not nervous/anxious.      Objective:     Vital Signs (Most Recent):  Temp: 99.3 °F (37.4 °C) (04/29/19 1220)  Pulse: 92 (04/29/19 1436)  Resp: 17 (04/29/19 1436)  BP: 136/74 (04/29/19 1220)  SpO2: (!) 94 % (04/29/19 1436) Vital Signs (24h Range):  Temp:  [98.9 °F (37.2 °C)-100.3 °F (37.9 °C)] 99.3 °F (37.4 °C)  Pulse:  [84-97] 92  Resp:  [16-23] 17  SpO2:  [87 %-98 %] 94 %  BP: (125-180)/(67-90) 136/74     Weight: 95.3 kg (210 lb 1.6 oz)  Body mass index is 26.98 kg/m².    Intake/Output Summary (Last 24 hours) at 4/29/2019 1445  Last data filed at 4/29/2019 1200  Gross per 24 hour   Intake 740 ml   Output 3615 ml   Net -2875 ml      Physical Exam   Constitutional: He is oriented to person, place, and time. He appears well-developed and well-nourished. No distress.   HENT:    Head: Normocephalic and atraumatic.   Mouth/Throat: Oropharynx is clear and moist.   Eyes: Pupils are equal, round, and reactive to light. Conjunctivae and EOM are normal. No scleral icterus.   Neck: Normal range of motion. Neck supple. No JVD present. No thyromegaly present.   Cardiovascular: Regular rhythm. Exam reveals no gallop and no friction rub.   Murmur heard.  Pulmonary/Chest: Effort normal. No respiratory distress. He has wheezes. He has rhonchi (mild). He has no rales.   Abdominal: Soft. Bowel sounds are normal. He exhibits no distension. There is no tenderness. There is no guarding.   Musculoskeletal: Normal range of motion.   Left upper arm fistula has a positive bruit and thrill   Neurological: He is oriented to person, place, and time. No cranial nerve deficit.   Skin: Skin is warm. Capillary refill takes less than 2 seconds. He is not diaphoretic. No erythema.   Nursing note and vitals reviewed.      Significant Labs:   CBC:   Recent Labs   Lab 04/28/19 2306 04/29/19  0308   WBC 5.50 5.67   HGB 8.2* 8.2*   HCT 24.7* 24.9*   * 103*     CMP:   Recent Labs   Lab 04/28/19 2306 04/29/19  0309    138   K 4.4 4.4   CL 97 98   CO2 26 28   GLU 87 93   BUN 59* 59*   CREATININE 13.3* 13.9*   CALCIUM 10.1 10.2   PROT 8.1 8.1   ALBUMIN 3.3* 3.2*   BILITOT 0.5 0.6   ALKPHOS 76 75   AST 39 37   ALT 37 36   ANIONGAP 14 12   EGFRNONAA 4* 4*     All pertinent labs within the past 24 hours have been reviewed.    Significant Imaging: I have reviewed all pertinent imaging results/findings within the past 24 hours.

## 2019-04-29 NOTE — PLAN OF CARE
Problem: Adult Inpatient Plan of Care  Goal: Plan of Care Review  Outcome: Ongoing (interventions implemented as appropriate)    Recommendations     Recommendation/Intervention: 1. Continue current diet 2. Will continue to monitor  Goals: Meet >85% EEN/EPN while admitted  Nutrition Goal Status: new  Communication of RD Recs: POC, sticky note

## 2019-04-29 NOTE — NURSING
Observed pt amb to bed, Appears to be coughing occasionally, AAO able to verbalize needs progressing toward goal

## 2019-04-29 NOTE — ED PROVIDER NOTES
SCRIBE #1 NOTE: I, Kelly Nolasco, am scribing for, and in the presence of, Sascha Connolly Jr., MD. I have scribed the entire note.       History     Chief Complaint   Patient presents with    Shortness of Breath     dx with pneumonia 2 weeks ago, states completed antibiotics and symptoms are not better     Review of patient's allergies indicates:  No Known Allergies      History of Present Illness     HPI    4/28/2019, 10:47 PM  History obtained from the patient      History of Present Illness: Isidro White Jr. is a 49 y.o. male patient with a PMHx of HTN, CHF, ESRD (dialysis M/W/F), alcoholism, Afib, Aortic valve stenosis, HLD, SVT, pulmonary HTN, Hepatitis B, and s/p aortic valve replacement (Feb. 2017) and cardiac cath (March 2017) who presents to the Emergency Department for evaluation of SOB which onset gradually over the past 2 weeks. Pt was dx with pneumonia 2 weeks PTA. Pt states that he has been compliant with his antibiotics, but sx have not improved. Symptoms are constant and moderate in severity. No mitigating or exacerbating factors reported. Associated sxs include productive cough (clear w/ occasional bloody colored sputum), fever/ chills, congestion, edema, wheezing, fatigue, and appetite changes. Pt is on anticoagulant medication, Coumadin. Patient denies any CP, palpations, HA, lightheadedness, dizziness, numbness, localized weakness, rhinorrhea, dysuria, hematurias, abdominal pain, n/v, sinus pain, ear pain, and all other sxs at this time. No further complaints or concerns at this time.     Arrival mode: EMS    PCP: Jacquelyn Skelton MD        Past Medical History:  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     HD M, W, F    ESRD on dialysis     Hepatitis B     Hyperlipidemia      Hypertension     Secondary hyperparathyroidism of renal origin     Supraventricular tachycardia     atrial tachycardia    Tachycardia     Valvular regurgitation     AI, TR       Past Surgical History:  Past Surgical History:   Procedure Laterality Date    ABLATION N/A 3/13/2017    [facility] CATH LAB    ASD REPAIR      age 8 [facility]    AV Graft Creation Left 03/2017    CARDIAC CATHETERIZATION      [facility]     CARDIAC VALVE SURGERY  02/08/2017    22 mm Medtronic AV, 28 mm TV Medtronic annuloplaty ring    [id number] Accuseal Left 3/16/2017    Performed [facility] Alcides Manning [facility] at [facility] OR 2ND FLR    RADIOFREQUENCY ABLATION  03/13/2017    Atrial tachycardia    REDO STERNOTOMY WITH AORTIC VALVE REPLACEMENT/redo sternotomy N/A 2/8/2017    Performed by Jose [facility]elum [facility] at [facility] OR 2ND FLR    REPAIR-VALVE-TRICUSPID  2/8/2017    [facility] OR 2ND FLR    REPLACEMENT-VALVE-AORTIC N/A 2/8/2017    Performed by [facility]um [facility] at [facility] OR 2ND FLR         Family History:  Family History   Problem Relation Age of Onset    Leukemia Mother     Heart attack Father         massive MI at age 67    No Known Problems Sister     No Known Problems Brother     No Known Problems Sister     No Known Problems Sister     Heart failure Paternal Grandmother     Diabetes Paternal Aunt     Hypertension Paternal Aunt     Leukemia Paternal Aunt         CLL    Suicide Paternal Uncle     Anesthesia problems Paternal Uncle     Diabetes Paternal Aunt     Stroke Paternal Aunt     Valvular heart disease Maternal Aunt     Kidney disease Neg Hx     Colon cancer Neg Hx        Social History:  Social History     Tobacco Use    Smoking status: Never Smoker    Smokeless tobacco: Never Used   Substance and Sexual Activity    Alcohol use: No     Frequency: Never     Comment: quit in 2016; does have heavy drinking history; started  drinking at age 13; was drinking a 12 pack over the weekend and two 24 ounces of beer a day during the week along with a pint of hard alcohol    Drug use: No    Sexual activity: Unknown        Review of Systems     Review of Systems   Constitutional: Positive for appetite change, chills, fatigue and fever.   HENT: Positive for congestion. Negative for ear pain, rhinorrhea, sinus pain and sore throat.    Respiratory: Positive for cough (productive), shortness of breath and wheezing.    Cardiovascular: Positive for leg swelling. Negative for chest pain and palpitations.   Gastrointestinal: Negative for abdominal pain, diarrhea, nausea and vomiting.   Genitourinary: Negative for dysuria and hematuria.   Musculoskeletal: Negative for back pain.   Skin: Negative for rash.   Neurological: Negative for dizziness, weakness, light-headedness, numbness and headaches.   Hematological: Does not bruise/bleed easily.   All other systems reviewed and are negative.     Physical Exam     Initial Vitals [04/28/19 2232]   BP Pulse Resp Temp SpO2   (!) 176/87 96 20 100.3 °F (37.9 °C) (!) 91 %      MAP       --          Physical Exam  Nursing Notes and Vital Signs Reviewed.  Constitutional: Patient is in no acute distress. Well-developed and well-nourished.  Head: Atraumatic. Normocephalic.  Eyes: PERRL. EOM intact. Conjunctivae are not pale. No scleral icterus.  ENT: Mucous membranes are moist. Oropharynx is clear and symmetric. Congested.   Neck: Supple. Full ROM. No lymphadenopathy.  Cardiovascular: Tachycardic. Regular rhythm. Able to hear a valve click. Distal pulses are 2+ and symmetric.  Pulmonary/Chest: No respiratory distress. No wheezing or rales. Diminished BS at bases.   Abdominal: Soft and non-distended.  There is no tenderness.  No rebound, guarding, or rigidity. Good bowel sounds.  Genitourinary: No CVA tenderness  Musculoskeletal: Moves all extremities. No obvious deformities. No calf tenderness.  Left upper arm  "dialysis AV graft in place. Right anterior forearm shows an indurated approximately 1.25 cm area surrounding a PPD skin test placement. 2+ BLE edema.   Skin: Warm and dry. Low grade fever noted.  Neurological:  Alert, awake, and appropriate.  Normal speech.  No acute focal neurological deficits are appreciated.  Psychiatric: Normal affect. Good eye contact. Appropriate in content.     ED Course   Procedures  ED Vital Signs:  Vitals:    04/28/19 2232 04/29/19 0004 04/29/19 0032   BP: (!) 176/87 (!) 156/74 (!) 143/69   Pulse: 96 97 93   Resp: 20 (!) 21 (!) 23   Temp: 100.3 °F (37.9 °C)     TempSrc: Oral     SpO2: (!) 91% (!) 89% 98%   Weight: 98.9 kg (218 lb)     Height: 6' 2" (1.88 m)         Abnormal Lab Results:  Labs Reviewed   CBC W/ AUTO DIFFERENTIAL - Abnormal; Notable for the following components:       Result Value    RBC 2.31 (*)     Hemoglobin 8.2 (*)     Hematocrit 24.7 (*)      (*)     MCH 35.5 (*)     Platelets 111 (*)     Eosinophil% 8.9 (*)     All other components within normal limits   COMPREHENSIVE METABOLIC PANEL - Abnormal; Notable for the following components:    BUN, Bld 59 (*)     Creatinine 13.3 (*)     Albumin 3.3 (*)     eGFR if  4 (*)     eGFR if non  4 (*)     All other components within normal limits   TROPONIN I - Abnormal; Notable for the following components:    Troponin I 0.084 (*)     All other components within normal limits   PROCALCITONIN - Abnormal; Notable for the following components:    Procalcitonin 1.06 (*)     All other components within normal limits   CULTURE, BLOOD   CULTURE, BLOOD   AFB CULTURE & SMEAR   CULTURE, FUNGUS   CULTURE, RESPIRATORY   CRYPTOCOCCAL ANTIGEN - BLOOD   LACTIC ACID, PLASMA   APTT   PROTIME-INR   LACTIC ACID, PLASMA   QUANTIFERON GOLD TB   M. TUBERCULOSIS DNA BY PCR   FUNGITELL ASSAY FOR (1.3)-B-D-GLUCANS   HIV 1 / 2 ANTIBODY   HISTOPLASMA ANTIGEN, SERUM   COCCIDIOIDES ANTIBODY, SERUM   DRUG SCREEN PANEL, URINE " EMERGENCY   LEGIONELLA ANTIGEN, URINE RANDOM   URINALYSIS, REFLEX TO URINE CULTURE        All Lab Results:  Results for orders placed or performed during the hospital encounter of 04/28/19   CBC auto differential   Result Value Ref Range    WBC 5.50 3.90 - 12.70 K/uL    RBC 2.31 (L) 4.60 - 6.20 M/uL    Hemoglobin 8.2 (L) 14.0 - 18.0 g/dL    Hematocrit 24.7 (L) 40.0 - 54.0 %     (H) 82 - 98 fL    MCH 35.5 (H) 27.0 - 31.0 pg    MCHC 33.2 32.0 - 36.0 g/dL    RDW 14.5 11.5 - 14.5 %    Platelets 111 (L) 150 - 350 K/uL    MPV 9.7 9.2 - 12.9 fL    Gran # (ANC) 3.3 1.8 - 7.7 K/uL    Lymph # 1.2 1.0 - 4.8 K/uL    Mono # 0.5 0.3 - 1.0 K/uL    Eos # 0.5 0.0 - 0.5 K/uL    Baso # 0.00 0.00 - 0.20 K/uL    Gran% 60.4 38.0 - 73.0 %    Lymph% 22.5 18.0 - 48.0 %    Mono% 8.2 4.0 - 15.0 %    Eosinophil% 8.9 (H) 0.0 - 8.0 %    Basophil% 0.0 0.0 - 1.9 %    Differential Method Automated    Comprehensive metabolic panel   Result Value Ref Range    Sodium 137 136 - 145 mmol/L    Potassium 4.4 3.5 - 5.1 mmol/L    Chloride 97 95 - 110 mmol/L    CO2 26 23 - 29 mmol/L    Glucose 87 70 - 110 mg/dL    BUN, Bld 59 (H) 6 - 20 mg/dL    Creatinine 13.3 (H) 0.5 - 1.4 mg/dL    Calcium 10.1 8.7 - 10.5 mg/dL    Total Protein 8.1 6.0 - 8.4 g/dL    Albumin 3.3 (L) 3.5 - 5.2 g/dL    Total Bilirubin 0.5 0.1 - 1.0 mg/dL    Alkaline Phosphatase 76 55 - 135 U/L    AST 39 10 - 40 U/L    ALT 37 10 - 44 U/L    Anion Gap 14 8 - 16 mmol/L    eGFR if African American 4 (A) >60 mL/min/1.73 m^2    eGFR if non African American 4 (A) >60 mL/min/1.73 m^2   Lactic acid, plasma #1   Result Value Ref Range    Lactate (Lactic Acid) 0.7 0.5 - 2.2 mmol/L   Troponin I   Result Value Ref Range    Troponin I 0.084 (H) 0.000 - 0.026 ng/mL   Procalcitonin   Result Value Ref Range    Procalcitonin 1.06 (H) <0.25 ng/mL   APTT   Result Value Ref Range    aPTT 31.7 21.0 - 32.0 sec   Protime-INR   Result Value Ref Range    Prothrombin Time 12.2 9.0 - 12.5 sec    INR 1.1 0.8 - 1.2      Imaging Results:  Imaging Results          CT Chest Without Contrast (In process)                X-Ray Chest AP Portable (Final result)  Result time 04/28/19 23:36:18    Final result by Christopher Sue Jr., MD (04/28/19 23:36:18)                 Impression:      Improved right mid lung infiltrate.  Cardiomegaly.      Electronically signed by: Christopher Sue Jr., MD  Date:    04/28/2019  Time:    23:36             Narrative:    EXAMINATION:  XR CHEST AP PORTABLE    CLINICAL HISTORY:  Sepsis;    COMPARISON:  04/17/2017    FINDINGS:  Mildly improved infiltrate in the right mid lung.  Remaining lungs are clear.  Cardiomegaly with evidence of sternotomy and valve replacement.  No effusion or pneumothorax..  The remaining osseous structures and soft tissues are within normal limits.                               00:25 AM: CT Chest without IV Contrast:  Report reviewed by me.  Radiologist: Sukhjinder Dubose MD    Findings:  Study is signifiicantly degraded by motion.    Mild to moderate cardiomegaly with trace pericardial effusion. Indeterminate mediastinal lymphadenopathy. There is a cluster of  tiny subsegmental nodules inferior right upper lobe and multiple clusters within the right middle lobe and right lower lobes. The largest single nodule measures 13.5 mm in the inferior right lower lobe. Multiple subcentimeter nodules left lower lobe. Trace right pleural effusion. Negative for pneumothorax. The larger nodules or clusters of nodules demonstrate surrounding groundglass halos consistent with a small amount of adjacent hemorrhage. Findings can be seen with atypical infection or neoplasm. Groundglass halos can be seen with fungal infections including but not limited to angioinvasive aspergillosis, mycobacterial infection, and metastatic and primary tumors.     The EKG was ordered, reviewed, and independently interpreted by the ED provider.  Interpretation time: 23:36  Rate: 94 BPM  Rhythm: SR w/ short  KS  Interpretation: LAD. Nonspecific intraventricular block. T wave abnormality consider lateral ischemia. Abnormal EKG. No STEMI.         The Emergency Provider reviewed the vital signs and test results, which are outlined above.     ED Discussion     (4/28/19)  10:55 PM: Notify nursing staff/ charge nurse of possible TB infection. Respiratory isolation protocol initiated.    (4/29/19)  00:25 AM: Spoke with Dr. Valdemar Piper, [facility], regarding pt's abnormal CT chest findings.    1:25 AM: Re-evaluated pt. I have discussed test results, shared treatment plan, and the need for admission with patient. Pt expresses understanding at this time and agree with all information. All questions answered. Pt has no further questions or concerns at this time. Pt is ready for admit.    1:26 AM: Discussed case with Dr. Valdemar Piper ([facility]). Let Dr. Piper know that I ordered quantiferon TB gold to initiate TB treatment. Dr. Piper agrees with current care and management of pt and accepts admission.   Admitting Service: [facility]  Admitting Physician: Dr. Piper  Admit to: IP (and isolation)/ telemetry    1:27 AM: Discussed pt's case, including positive PPD and CT chest showing hemorrhagic nodules with mycobacterial infection in the differential, with Dr. Jarett Ivey (Nephrologist). Let Dr. Ivey know that the pt is due dialysis this morning. Dr. Ivey is aware of the pt.    ED Medication(s):  Medications   vancomycin (VANCOCIN) 2,500 mg in dextrose 5 % 500 mL IVPB (has no administration in time range)   doxycycline tablet 100 mg (has no administration in time range)   piperacillin-tazobactam 4.5 g in dextrose 5 % 100 mL IVPB (ready to mix system) (4.5 g Intravenous New Bag 4/29/19 0108)       New Prescriptions    No medications on file                 Medical Decision Making:   Clinical Tests:   Lab Tests: Ordered and Reviewed  Radiological Study: Ordered and Reviewed  Medical Tests: Ordered and  Reviewed             Scribe Attestation:   Scribe #1: I performed the above scribed service and the documentation accurately describes the services I performed. I attest to the accuracy of the note.     Attending:   Physician Attestation Statement for Scribe #1: I, Sascha Connolly Jr., MD, personally performed the services described in this documentation, as scribed by Kelly Nolasco, in my presence, and it is both accurate and complete.           Clinical Impression       ICD-10-CM ICD-9-CM   1. ESRD (end stage renal disease) on dialysis N18.6 585.6    Z99.2 V45.11   2. SOB (shortness of breath) R06.02 786.05   3. Pneumonia J18.9 486   4. PPD positive R76.11 795.51   5. Multiple lung nodules on CT R91.8 793.19       Disposition:   Disposition: Admitted (+ Isolation)  Condition: Fair         Sascha Connolly Jr., MD  04/29/19 0204

## 2019-04-29 NOTE — CONSULTS
[facility]  Infectious Disease  Consult Note    Patient Name: Isidro White Jr.  MRN: [id number]  Admission Date: 4/28/2019  Hospital Length of Stay: 0 days  Attending Physician: Arelis Dickerson MD  Primary Care Provider: Jacquelyn Skelton MD     Isolation Status: Airborne    Patient information was obtained from patient, past medical records and ER records.      Consults  Assessment/Plan:     * Pneumonia  4/29- will do chest Cts can with contrast , agree with TB rule with AFB, send Gold quantiferon, add empiric Voriconazole , continue Vanco/zosyn     Atrial fibrillation  4/29- will continue diltiazem, monitor closely.    ESRD (end stage renal disease) on dialysis  ESRD as per HD    S/P AVR (aortic valve replacement)  4/29- INR is 1.1, on heparin drip     Essential hypertension  04/29- will continue diltiazem/amldoipine          Thank you for your consult. I will follow-up with patient. Please contact us if you have any additional questions.    Christopher Dumont MD  Infectious Disease  [facility]    Subjective:     Principal Problem: Pneumonia    HPI: 49 year old man with history of  AS s/p AVR, ESRD, hepatitis B, and Afib presents with SOB for past 2-3 weeks.  He was in halfway many [place] and at this time reports night sweats, cough and occasional hemoptysis .   In ER, CT chest without contrast  show  cluster of  tiny subsegmental nodules inferior right upper lobe and multiple clusters within the right middle lobe and right lower lobes, largest single nodule measures 13.5 mm in the inferior right lower lobe, multiple subcentimeter nodules left lower lobe,  larger nodules or clusters of nodules demonstrate surrounding groundglass halos consistent with a small amount of adjacent hemorrhage.     He had negative Gold quantiferon -01/2019. Since admission, he is now persistently febrile-T max 101.7      Past Medical History:   Diagnosis Date    Aortic valve stenosis     s/p mechanical AVR     Atrial fibrillation     Atrial flutter     Cardiomyopathy     CHF (congestive heart failure)     Drug-induced erectile dysfunction     ESRD due to hypertension     HD M, W, F    ESRD on dialysis     Hepatitis B     Hyperlipidemia     Hypertension     Secondary hyperparathyroidism of renal origin     Supraventricular tachycardia     atrial tachycardia    Tachycardia     Valvular regurgitation     AI, TR       Past Surgical History:   Procedure Laterality Date    ABLATION N/A 3/13/2017    Performed by Nigel Guallpa MD at [facility] CATH LAB    ASD REPAIR      age 8 [facility]    AV Graft Creation Left 03/2017    CARDIAC [facility]HETERIZATION      [facility]     CARDIAC VALVE SURGERY  02/08/2017    22 mm Medtronic AV, 28 mm TV Medtronic annuloplaty ring    [id number] Accuseal Left 3/16/2017    Performed by Alcides Manning MD at [facility] OR 2ND FLR    RADIOFREQUENCY ABLATION  03/13/2017    Atrial tachycardia    REDO STERNOTOMY WITH AORTIC VALVE REPLACEMENT/redo sternotomy N/A 2/8/2017    Performed by Jose Gastelum MD at [facility] OR 2ND FLR    REPAIR-VALVE-TRICUSPID  2/8/2017    Performed by Jose [facility]elum MD at [facility] OR 2ND FLR    REPLACEMENT-VALVE-AORTIC N/A 2/8/2017    Performed by Jose Gastelum MD at [facility] OR 2ND FLR       Review of patient's allergies indicates:  No Known Allergies    Medications:  Medications Prior to Admission   Medication Sig    amLODIPine (NORVASC) 10 MG tablet Take 1 tablet (10 mg total) by mouth once daily.    amoxicillin-clavulanate 875-125mg (AUGMENTIN) 875-125 mg per tablet     ascorbic acid, vitamin C, (VITAMIN C) 500 MG tablet Take 1 tablet (500 mg total) by mouth 2 (two) times daily.    b complex vitamins tablet Take 1 tablet by mouth once daily.    calcium acetate (PHOSLO) 667 mg capsule Take 1 capsule (667 mg total) by mouth 3 (three) times daily.    diltiaZEM (CARDIZEM CD) 360 MG 24 hr capsule Take 1 capsule (360 mg total) by  mouth once daily.    epoetin vikki (PROCRIT) 10,000 unit/mL injection Inject 1 mL (10,000 Units total) into the skin every Mon, Wed, Fri. To be given with HD    furosemide (LASIX) 80 MG tablet TAKE ONE TABLET BY MOUTH EVERY DAY    ipratropium-albuterol (COMBIVENT)  mcg/actuation inhaler Inhale 1 puff into the lungs every 4 (four) hours as needed for Wheezing or Shortness of Breath. Rescue    lisinopril (PRINIVIL,ZESTRIL) 40 MG tablet TAKE ONE TABLET BY MOUTH EVERY DAY    multivitamin with minerals tablet Take 1 tablet by mouth once daily.    pantoprazole (PROTONIX) 40 MG tablet TAKE ONE TABLET BY MOUTH EVERY DAY    ETHAN-RACQUEL RX 1- mg-mg-mcg Tab TAKE ONE TABLET BY MOUTH EVERY DAY    tenofovir (VIREAD) 300 mg Tab Take 1 tablet (300 mg total) by mouth once a week. AFTER DIALYSIS    hydrOXYzine HCl (ATARAX) 25 MG tablet TAKE 1 TABLET BY MOUTH DAILY AS NEEDED FOR ITCHING.    labetalol (NORMODYNE) 100 MG tablet TAKE ONE TABLET BY MOUTH THREE TIMES DAILY AS NEEDED IF HEART RATE GREATER THAN 120    omega-3 fatty acids-vitamin E (FISH OIL) 1,000 mg Cap Take 1 capsule by mouth once daily.    warfarin (COUMADIN) 7.5 MG tablet Take 1 tablet (7.5 mg total) by mouth Daily.    zolpidem (AMBIEN) 5 MG Tab TAKE ONE TABLET BY MOUTH EACH EVENING AT BEDTIME AS NEEDED FOR INSOMNIA     Antibiotics (From admission, onward)    Start     Stop Route Frequency Ordered    05/06/19 2100  vancomycin 1.5gm/D5W 250ml PLACEHOLDER DOSE      -- IV Every 72 hours 04/29/19 0507    04/29/19 1300  piperacillin-tazobactam 4.5 g in dextrose 5 % 100 mL IVPB (ready to mix system)      -- IV Every 12 hours (non-standard times) 04/29/19 0217        Antifungals (From admission, onward)    Start     Stop Route Frequency Ordered    04/29/19 2100  voriconazole 200 mg/5 mL (40 mg/mL) suspension 200 mg      -- Oral Every 12 hours 04/29/19 1836        Antivirals (From admission, onward)    None           Immunization History   Administered  Date(s) Administered    Influenza - Quadrivalent - PF 03/28/2019    PPD Test 02/13/2017    Pneumococcal Conjugate - 13 Valent 03/28/2019       Family History     Problem Relation (Age of Onset)    Anesthesia problems Paternal Uncle    Diabetes Paternal Aunt, Paternal Aunt    Heart attack Father    Heart failure Paternal Grandmother    Hypertension Paternal Aunt    Leukemia Mother, Paternal Aunt    No Known Problems Sister, Brother, Sister, Sister    Stroke Paternal Aunt    Suicide Paternal Uncle    Valvular heart disease Maternal Aunt        Social History     Socioeconomic History    Marital status: Single     Spouse name: Not on file    Number of children: Not on file    Years of education: Not on file    Highest education level: Not on file   Occupational History    Not on file   Social Needs    Financial resource strain: Not on file    Food insecurity:     Worry: Not on file     Inability: Not on file    Transportation needs:     Medical: Not on file     Non-medical: Not on file   Tobacco Use    Smoking status: Never Smoker    Smokeless tobacco: Never Used   Substance and Sexual Activity    Alcohol use: No     Frequency: Never     Comment: quit in 2016; does have heavy drinking history; started drinking at age 13; was drinking a 12 pack over the weekend and two 24 ounces of beer a day during the week along with a pint of hard alcohol    Drug use: No    Sexual activity: Not on file   Lifestyle    Physical activity:     Days per week: Not on file     Minutes per session: Not on file    Stress: Not on file   Relationships    Social connections:     Talks on phone: Not on file     Gets together: Not on file     Attends [employer] service: Not on file     Active member of club or organization: Not on file     Attends meetings of clubs or organizations: Not on file     Relationship status: Not on file   Other Topics Concern    Not on file   Social History Narrative    Not on file     Review of  Systems   Constitutional: Negative.  Negative for activity change, appetite change, chills, diaphoresis, fatigue and fever.   HENT: Negative.  Negative for congestion and trouble swallowing.    Eyes: Negative.    Respiratory: Positive for cough and shortness of breath. Negative for chest tightness and wheezing.         Hemoptysis   Cardiovascular: Positive for leg swelling. Negative for chest pain and palpitations.   Gastrointestinal: Negative.  Negative for abdominal distention, abdominal pain, nausea and vomiting.   Genitourinary: Negative.  Negative for decreased urine volume, difficulty urinating, dysuria, enuresis, flank pain, frequency, hematuria, penile swelling, scrotal swelling and urgency.   Musculoskeletal: Negative.  Negative for arthralgias, back pain, joint swelling and neck pain.   Skin: Negative for rash.   Neurological: Negative.  Negative for tremors, seizures and headaches.   Psychiatric/Behavioral: Negative.  Negative for confusion and sleep disturbance. The patient is not nervous/anxious.      Objective:     Vital Signs (Most Recent):  Temp: (!) 101 °F (38.3 °C) (04/29/19 1729)  Pulse: 97 (04/29/19 1700)  Resp: 18 (04/29/19 1559)  BP: (!) 147/69 (04/29/19 1559)  SpO2: (!) 91 % (04/29/19 1559) Vital Signs (24h Range):  Temp:  [98.9 °F (37.2 °C)-101.7 °F (38.7 °C)] 101 °F (38.3 °C)  Pulse:  [84-97] 97  Resp:  [16-23] 18  SpO2:  [87 %-98 %] 91 %  BP: (125-180)/(67-90) 147/69     Weight: 95.3 kg (210 lb 1.6 oz)  Body mass index is 26.98 kg/m².    Estimated Creatinine Clearance: 7.6 mL/min (A) (based on SCr of 13.9 mg/dL (H)).    Physical Exam   Constitutional: He is oriented to person, place, and time. He appears well-developed and well-nourished. No distress.   HENT:   Head: Normocephalic and atraumatic.   Mouth/Throat: Oropharynx is clear and moist.   Eyes: Pupils are equal, round, and reactive to light. Conjunctivae and EOM are normal. No scleral icterus.   Neck: Normal range of motion. Neck  supple. No JVD present. No thyromegaly present.   Cardiovascular: Regular rhythm. Exam reveals no gallop and no friction rub.   Murmur heard.  Pulmonary/Chest: Effort normal. No respiratory distress. He has wheezes. He has rhonchi (mild). He has no rales.   Abdominal: Soft. Bowel sounds are normal. He exhibits no distension. There is no tenderness. There is no guarding.   Musculoskeletal: Normal range of motion.   Left upper arm fistula has a positive bruit and thrill   Neurological: He is oriented to person, place, and time. No cranial nerve deficit.   Skin: Skin is warm. Capillary refill takes less than 2 seconds. He is not diaphoretic. No erythema.   Nursing note and vitals reviewed.      Significant Labs:   Blood Culture:   Recent Labs   Lab 03/27/19  1200 03/27/19  1215   LABBLOO No growth after 5 days. No growth after 5 days.     BMP:   Recent Labs   Lab 04/29/19  0309   GLU 93      K 4.4   CL 98   CO2 28   BUN 59*   CREATININE 13.9*   CALCIUM 10.2   MG 2.2     Respiratory Culture:   Recent Labs   Lab 03/28/19  0744 04/29/19  0416   GSRESP >10epis/lfp and <than many WBC's  Predominance of oropharyngeal gaye. Please recollect. <10 epithelial cells per low power field.  Rare WBC's  Few Gram positive cocci  Rare Gram negative rods   RESPIRATORYC Specimen inadequate - culture not performed  --      Wound Culture: No results for input(s): LABAERO in the last 4320 hours.  All pertinent labs within the past 24 hours have been reviewed.    Significant Imaging: I have reviewed all pertinent imaging results/findings within the past 24 hours.

## 2019-04-29 NOTE — HOSPITAL COURSE
4/ 29: Seen and examined on telemetry. Patient is on TB isolation. Empiric IV Vancomycin and Zosyn initiated.

## 2019-04-29 NOTE — ASSESSMENT & PLAN NOTE
Continue IV Zosyn and Vancomycin  Check Sputum culture  Follow sputum for AFB  May add IV Micafungin empirically  Complete 10 - 14 days total antibiotics

## 2019-04-29 NOTE — PLAN OF CARE
Problem: Adult Inpatient Plan of Care  Goal: Plan of Care Review  Outcome: Ongoing (interventions implemented as appropriate)  Pt free from fall and injury on shift.  Pt febrile.  Treated with PO Tylenol per MD orders.  HD performed today.  Pt refusing Lovenox.  ALEX Perez notified.

## 2019-04-29 NOTE — PROGRESS NOTES
Isidro Blake White Jr. 's Coumadin will be dosed and monitored by Pharmacy at the request of Ana Serrano NP   Indication: Atrial fibrillation S/P AVR   History -(aortic valve replacement)  Target INR is 2-3  INR   Date Value Ref Range Status   04/29/2019 1.1 0.8 - 1.2 Final     Comment:     Coumadin Therapy:  2.0 - 3.0 for INR for all indicators except mechanical heart valves  and antiphospholipid syndromes which should use 2.5 - 3.5.       Patient will be resumed on a home dose of  7.5 mg per day.      PT/INR will be monitored daily.     Dose adjustments will be made accordingly.      Thank you for allowing us to participate in this patient's care.     Brenda Dickens [facility] 4/29/2019 3:17 PM

## 2019-04-29 NOTE — SUBJECTIVE & OBJECTIVE
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     HD M, W, F    ESRD on dialysis     Hepatitis B     Hyperlipidemia     Hypertension     Secondary hyperparathyroidism of renal origin     Supraventricular tachycardia     atrial tachycardia    Tachycardia     Valvular regurgitation     AI, TR       Past Surgical History:   Procedure Laterality Date    ABLATION N/A 3/13/2017    [facility]    ASD REPAIR      age 8 [facility]    AV Graft Creation Left 03/2017    CARD[facility]AC [facility]ATION      [facility]     CARDIAC VALVE SURGERY  02/08/2017    22 mm Medtronic AV, 28 mm TV Medtronic annuloplaty ring    [id number] Accuseal Left 3/16/2017    Performed by Alcides Manning [facility] 2ND F[facility]    RADIOFREQUENCY ABLATION  03/13/2017    Atrial tachycardia    REDO STERNOTOMY WITH AORTIC VALVE REPLACEMENT/redo sternotomy N/A 2/8/2017    Performed by [facility]    REPAIR-VALVE-TRICUSPID  2/8/2017    Performed by [facility]    REPLACEMENT-VALVE-AORTIC N/A 2/8/2017    Performed by [facility]       Review of patient's allergies indicates:  No Known Allergies  Current Facility-Administered Medications   Medication Frequency    acetaminophen tablet 650 mg Q6H PRN    albuterol-ipratropium 2.5 mg-0.5 mg/3 mL nebulizer solution 3 mL Q6H PRN    amLODIPine tablet 10 mg Daily    arformoterol nebulizer solution 15 mcg Q12H    budesonide nebulizer solution 0.5 mg Q12H    dextrose 50% injection 12.5 g PRN    dextrose 50% injection 25 g PRN    diltiaZEM 24 hr capsule 360 mg Daily    furosemide tablet 80 mg Daily    glucagon (human recombinant) injection 1 mg  PRN    glucose chewable tablet 16 g PRN    glucose chewable tablet 24 g PRN    heparin (porcine) injection 3,000 Units PRN    ondansetron injection 4 mg Q8H PRN    pantoprazole injection 40 mg Daily    piperacillin-tazobactam 4.5 g in dextrose 5 % 100 mL IVPB (ready to mix system) Q12H    promethazine (PHENERGAN) 6.25 mg in dextrose 5 % 50 mL IVPB Q6H PRN    sodium chloride 0.9% flush 5 mL PRN    [START ON 5/6/2019] vancomycin 1.5gm/D5W 250ml PLACEHOLDER DOSE Q72H     Family History     Problem Relation (Age of Onset)    Anesthesia problems Paternal Uncle    Diabetes Paternal Aunt, Paternal Aunt    Heart attack Father    Heart failure Paternal Grandmother    Hypertension Paternal Aunt    Leukemia Mother, Paternal Aunt    No Known Problems Sister, Brother, Sister, Sister    Stroke Paternal Aunt    Suicide Paternal Uncle    Valvular heart disease Maternal Aunt        Tobacco Use    Smoking status: Never Smoker    Smokeless tobacco: Never Used   Substance and Sexual Activity    Alcohol use: No     Frequency: Never     Comment: quit in 2016; does have heavy drinking history; started drinking at age 13; was drinking a 12 pack over the weekend and two 24 ounces of beer a day during the week along with a pint of hard alcohol    Drug use: No    Sexual activity: Not on file     Review of Systems   Constitutional: Negative.  Negative for activity change, appetite change, chills, diaphoresis, fatigue and fever.   HENT: Negative.  Negative for congestion and trouble swallowing.    Eyes: Negative.    Respiratory: Positive for cough and shortness of breath. Negative for chest tightness and wheezing.         Hemoptysis   Cardiovascular: Negative.  Negative for chest pain, palpitations and leg swelling.   Gastrointestinal: Negative.  Negative for abdominal distention, abdominal pain, nausea and vomiting.   Genitourinary: Negative.  Negative for decreased urine volume, difficulty urinating, dysuria, enuresis, flank  pain, frequency, hematuria, penile swelling, scrotal swelling and urgency.   Musculoskeletal: Negative.  Negative for arthralgias, back pain, joint swelling and neck pain.   Skin: Negative for rash.   Neurological: Negative.  Negative for tremors, seizures and headaches.   Psychiatric/Behavioral: Negative.  Negative for confusion and sleep disturbance. The patient is not nervous/anxious.      Objective:     Vital Signs (Most Recent):  Temp: 99 °F (37.2 °C) (04/29/19 0820)  Pulse: 84 (04/29/19 1140)  Resp: 20 (04/29/19 1140)  BP: 133/70 (04/29/19 1140)  SpO2: 97 % (04/29/19 0850)  O2 Device (Oxygen Therapy): nasal cannula (04/29/19 1140) Vital Signs (24h Range):  Temp:  [99 °F (37.2 °C)-100.3 °F (37.9 °C)] 99 °F (37.2 °C)  Pulse:  [84-97] 84  Resp:  [16-23] 20  SpO2:  [87 %-98 %] 97 %  BP: (125-180)/(67-90) 133/70     Weight: 95.3 kg (210 lb 1.6 oz) (04/29/19 0329)  Body mass index is 26.98 kg/m².  Body surface area is 2.23 meters squared.    I/O last 3 completed shifts:  In: 740 [P.O.:240; IV Piggyback:500]  Out: -     Physical Exam   Constitutional: He is oriented to person, place, and time. He appears well-developed and well-nourished. No distress.   HENT:   Head: Normocephalic and atraumatic.   Mouth/Throat: Oropharynx is clear and moist.   Eyes: Pupils are equal, round, and reactive to light. Conjunctivae and EOM are normal. No scleral icterus.   Neck: Normal range of motion. Neck supple. No JVD present. No thyromegaly present.   Cardiovascular: Regular rhythm. Exam reveals no gallop and no friction rub.   Murmur heard.  tachycardia   Pulmonary/Chest: Effort normal. No respiratory distress. He has wheezes. He has no rales.   Abdominal: Soft. Bowel sounds are normal. He exhibits no distension. There is no tenderness. There is no guarding.   Musculoskeletal: Normal range of motion.   Left upper arm fistula has a positive bruit and thrill   Neurological: He is oriented to person, place, and time. No cranial nerve  deficit.   Skin: Skin is warm. Capillary refill takes less than 2 seconds. He is not diaphoretic. No erythema.   Nursing note and vitals reviewed.      Significant Labs:  All labs within the past 24 hours have been reviewed.    Significant Imaging:  Labs: Reviewed  X-Ray: Reviewed  CT: Reviewed

## 2019-04-29 NOTE — CONSULTS
[facility] -   Nephrology  Consult Note        Patient Name: Isidro White Jr.  MRN: [id number]  Admission Date: 4/28/2019  Hospital Length of Stay: 0 days  Attending Provider: Arelis Dickerson MD   Primary Care Physician: Jacquelyn Skelton MD  Principal Problem:Pneumonia    Consults  Subjective:     HPI: 50 y/o male with ESRD, on chronic HD at [facility] MWF, HTN, hepatitis B (currently on active treatment), diastolic CHF, h/o of AVR 2017.  Patient also had hematuria status post negative CT urogram except for renal cyst.  Refused cystoscopy with Dr. hicks consent in Urology.  In the last 2-3 weeks he has been having hemoptysis status post antibiotics.  He has positive TB skin test but negative Gold interferon test.  Came in last night with recurrent hemoptysis and shortness of breath.  Had a CT scan of the chest which showed fluid overload and admitted for TB precautions.  TB precautions were taken off this morning because of no evidence of tuberculosis on CT scan or on Gold interferon testing done recently.    Patient seen and examined at the bedside for evaluation of lung nodules along with pulmonary consultant-Dr. Chris Sheets.  Patient is also seen and examined during dialysis at the bedside      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     HD M, W, F    ESRD on dialysis     Hepatitis B     Hyperlipidemia     Hypertension     Secondary hyperparathyroidism of renal origin     Supraventricular tachycardia     atrial tachycardia    Tachycardia     Valvular regurgitation     AI, TR       Past Surgical History:   Procedure Laterality Date    ABLATION N/A 3/13/2017    Performed by [facility]el [facility] at [facility] CATH LAB    ASD REPAIR      age 8 [facility]    AV Graft Creation Left  03/2017    CARDIAC CATHETERIZATION      [facility]     CARDIAC VALVE SURGERY  02/08/2017    22 mm Medtronic AV, 28 mm TV Medtronic annuloplaty ring    [id number] Accuseal Left 3/16/2017    Per[facility] OR 2ND FLR    RADIOFREQUENCY ABLATION  03/13/2017    Atrial tachycardia    REDO STERNOTOMY WITH AORTIC VALVE REPLACEMENT/redo sternotomy N/A 2/8/2017    Performed by [facility] at [facility] OR 2ND FLR    REPAIR-VALVE-TRICUSPID  2/8/2017    Per[facility] by [facility] at [facility] OR 2ND FLR    REPLACEMENT-VALVE-AORTIC N/A 2/8/2017    Performed by Jose [facility] at [facility] OR [address] [place] [address]       Review of patient's allergies indicates:  No Known Allergies  Current Facility-Administered Medications   Medication Frequency    acetaminophen tablet 650 mg Q6H PRN    albuterol-ipratropium 2.5 mg-0.5 mg/3 mL nebulizer solution 3 mL Q6H PRN    amLODIPine tablet 10 mg Daily    arformoterol nebulizer solution 15 mcg Q12H    budesonide nebulizer solution 0.5 mg Q12H    dextrose 50% injection 12.5 g PRN    dextrose 50% injection 25 g PRN    diltiaZEM 24 hr capsule 360 mg Daily    furosemide tablet 80 mg Daily    glucagon (human recombinant) injection 1 mg PRN    glucose chewable tablet 16 g PRN    glucose chewable tablet 24 g PRN    heparin (porcine) injection 3,000 Units PRN    ondansetron injection 4 mg Q8H PRN    pantoprazole injection 40 mg Daily    piperacillin-tazobactam 4.5 g in dextrose 5 % 100 mL IVPB (ready to mix system) Q12H    promethazine (PHENERGAN) 6.25 mg in dextrose 5 % 50 mL IVPB Q6H PRN    sodium chloride 0.9% flush 5 mL PRN    [START ON 5/6/2019] vancomycin 1.5gm/D5W 250ml PLACEHOLDER DOSE Q72H     Family History     Problem Relation (Age of Onset)    Anesthesia problems Paternal Uncle    Diabetes Paternal Aunt, Paternal Aunt    Heart attack Father    Heart failure Paternal Grandmother    Hypertension Paternal Aunt     Leukemia Mother, Paternal Aunt    No Known Problems Sister, Brother, Sister, Sister    Stroke Paternal Aunt    Suicide Paternal Uncle    Valvular heart disease Maternal Aunt        Tobacco Use    Smoking status: Never Smoker    Smokeless tobacco: Never Used   Substance and Sexual Activity    Alcohol use: No     Frequency: Never     Comment: quit in 2016; does have heavy drinking history; started drinking at age 13; was drinking a 12 pack over the weekend and two 24 ounces of beer a day during the week along with a pint of hard alcohol    Drug use: No    Sexual activity: Not on file     Review of Systems   Constitutional: Negative.  Negative for activity change, appetite change, chills, diaphoresis, fatigue and fever.   HENT: Negative.  Negative for congestion and trouble swallowing.    Eyes: Negative.    Respiratory: Positive for cough and shortness of breath. Negative for chest tightness and wheezing.         Hemoptysis   Cardiovascular: Negative.  Negative for chest pain, palpitations and leg swelling.   Gastrointestinal: Negative.  Negative for abdominal distention, abdominal pain, nausea and vomiting.   Genitourinary: Negative.  Negative for decreased urine volume, difficulty urinating, dysuria, enuresis, flank pain, frequency, hematuria, penile swelling, scrotal swelling and urgency.   Musculoskeletal: Negative.  Negative for arthralgias, back pain, joint swelling and neck pain.   Skin: Negative for rash.   Neurological: Negative.  Negative for tremors, seizures and headaches.   Psychiatric/Behavioral: Negative.  Negative for confusion and sleep disturbance. The patient is not nervous/anxious.      Objective:     Vital Signs (Most Recent):  Temp: 99 °F (37.2 °C) (04/29/19 0820)  Pulse: 84 (04/29/19 1140)  Resp: 20 (04/29/19 1140)  BP: 133/70 (04/29/19 1140)  SpO2: 97 % (04/29/19 0850)  O2 Device (Oxygen Therapy): nasal cannula (04/29/19 1140) Vital Signs (24h Range):  Temp:  [99 °F (37.2 °C)-100.3 °F (37.9  °C)] 99 °F (37.2 °C)  Pulse:  [84-97] 84  Resp:  [16-23] 20  SpO2:  [87 %-98 %] 97 %  BP: (125-180)/(67-90) 133/70     Weight: 95.3 kg (210 lb 1.6 oz) (04/29/19 0329)  Body mass index is 26.98 kg/m².  Body surface area is 2.23 meters squared.    I/O last 3 completed shifts:  In: 740 [P.O.:240; IV Piggyback:500]  Out: -     Physical Exam   Constitutional: He is oriented to person, place, and time. He appears well-developed and well-nourished. No distress.   HENT:   Head: Normocephalic and atraumatic.   Mouth/Throat: Oropharynx is clear and moist.   Eyes: Pupils are equal, round, and reactive to light. Conjunctivae and EOM are normal. No scleral icterus.   Neck: Normal range of motion. Neck supple. No JVD present. No thyromegaly present.   Cardiovascular: Regular rhythm. Exam reveals no gallop and no friction rub.   Murmur heard.  tachycardia   Pulmonary/Chest: Effort normal. No respiratory distress. He has wheezes. He has no rales.   Abdominal: Soft. Bowel sounds are normal. He exhibits no distension. There is no tenderness. There is no guarding.   Musculoskeletal: Normal range of motion.   Left upper arm fistula has a positive bruit and thrill   Neurological: He is oriented to person, place, and time. No cranial nerve deficit.   Skin: Skin is warm. Capillary refill takes less than 2 seconds. He is not diaphoretic. No erythema.   Nursing note and vitals reviewed.      Significant Labs:  All labs within the past 24 hours have been reviewed.    Significant Imaging:  Labs: Reviewed  X-Ray: Reviewed  CT: Reviewed    Assessment/Plan:     ESRD on dialysis  Hemodialysis started today for fluid overload, hemoptysis and ESRD.  Detailed discussion with infectious disease doctor Christopher Enciso , pulmonary physician .  No evidence of tuberculosis.  CT scan findings are more consistent with fluid overload than any infectious process.  Detailed discussion with the patient about plans of progressive ultrafiltration and  decreasing the dry weight.  We will do dialysis today and tomorrow.  Follow-up with pulmonary as an outpatient.        Thank you for your consult.     Jarett Ivey MD   Nephrology  [facility] [place]

## 2019-04-29 NOTE — CONSULTS
[facility] -   Pulmonology  Consult Note    Patient Name: Isidro White Jr.  MRN: [id number]  Admission Date: 4/28/2019  Hospital Length of Stay: 0 days  Code Status: Full Code  Attending Physician: Arelis Dickerson MD  Primary Care Provider: Jacquelyn Skelton MD   Principal Problem: Pneumonia      Subjective:     HPI:  49 year old male who  has a past medical history of Aortic valve stenosis, Atrial fibrillation, Atrial flutter, Cardiomyopathy, CHF (congestive heart failure), Drug-induced erectile dysfunction, ESRD due to hypertension, ESRD on dialysis, Hepatitis B, Hyperlipidemia, Hypertension, Secondary hyperparathyroidism of renal origin, Supraventricular tachycardia, Tachycardia, and Valvular regurgitation admitted for pneumonia. Pulmonary consulted to assist with management.     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     HD M, W, F    ESRD on dialysis     Hepatitis B     Hyperlipidemia     Hypertension     Secondary hyperparathyroidism of renal origin     Supraventricular tachycardia     atrial tachycardia    Tachycardia     Valvular regurgitation     AI, TR       Past Surgical History:   Procedure Laterality Date    ABLATION N/A 3/13/2017    Performed by [facility]el [facility] at [facility] CATH LAB    ASD REPAIR      age 8 [facility]    AV Graft Creation Left 03/2017    CARDIAC [facility]HETERIZATION      [facility]     CARDIAC VALVE SURGERY  02/08/2017    22 mm Medtronic AV, 28 mm TV Medtronic annuloplaty ring    [id number] Accuseal Left 3/16/2017    Performed by Alcides Manning MD at [facility] OR 2ND FLR    RADIOFREQUENCY ABLATION  03/13/2017    Atrial tachycardia    REDO STERNOTOMY WITH AORTIC VALVE REPLACEMENT/redo sternotomy N/A 2/8/2017    Performed by  Jose G[facility]elum MD at [facility] OR 2ND FLR    REPAIR-VALVE-TRICUSPID  2/8/2017    Performed by [facility] at [facility] OR 2ND FLR    REPLACEMENT-VALVE-AORTIC N/A 2/8/2017    Performed by Jose G[facility]elum MD at [facility] OR 2ND FLR       Review of patient's allergies indicates:  No Known Allergies      Scheduled Meds:   amLODIPine  10 mg Oral Daily    arformoterol  15 mcg Nebulization Q12H    budesonide  0.5 mg Nebulization Q12H    diltiaZEM  360 mg Oral Daily    doxycycline  100 mg Oral Q12H    epoetin vikki (PROCRIT) injection  10,000 Units Intravenous Once    furosemide  80 mg Oral Daily    pantoprazole  40 mg Intravenous Daily    piperacillin-tazobactam (ZOSYN) IVPB  4.5 g Intravenous Q12H    [START ON 5/8/2019] vancomycin (VANCOCIN) IVPB  1,500 mg Intravenous Q72H     Continuous Infusions:  PRN Meds:.acetaminophen, albuterol-ipratropium, dextrose 50%, dextrose 50%, glucagon (human recombinant), glucose, glucose, heparin (porcine), ondansetron, promethazine (PHENERGAN) IVPB, sodium chloride 0.9%       Family History     Problem Relation (Age of Onset)    Anesthesia problems Paternal Uncle    Diabetes Paternal Aunt, Paternal Aunt    Heart attack Father    Heart failure Paternal Grandmother    Hypertension Paternal Aunt    Leukemia Mother, Paternal Aunt    No Known Problems Sister, Brother, Sister, Sister    Stroke Paternal Aunt    Suicide Paternal Uncle    Valvular heart disease Maternal Aunt        Tobacco Use    Smoking status: Never Smoker    Smokeless tobacco: Never Used   Substance and Sexual Activity    Alcohol use: No     Frequency: Never     Comment: quit in 2016; does have heavy drinking history; started drinking at age 13; was drinking a 12 pack over the weekend and two 24 ounces of beer a day during the week along with a pint of hard alcohol    Drug use: No    Sexual activity: Not on file         Review of Systems   Constitutional: Positive for chills, fatigue and fever. Negative for  activity change, appetite change and diaphoresis.   HENT: Negative for facial swelling, sore throat, tinnitus and trouble swallowing.    Eyes: Negative for photophobia and visual disturbance.   Respiratory: Positive for cough and shortness of breath. Negative for apnea, chest tightness and wheezing.    Cardiovascular: Negative for chest pain, palpitations and leg swelling.   Gastrointestinal: Negative for abdominal distention, abdominal pain, constipation, diarrhea, nausea and vomiting.   Endocrine: Negative for polydipsia, polyphagia and polyuria.   Genitourinary: Negative for decreased urine volume, dysuria, flank pain, frequency and hematuria.   Musculoskeletal: Negative for arthralgias, back pain, joint swelling, myalgias and neck stiffness.   Skin: Negative for pallor and rash.   Allergic/Immunologic: Negative for immunocompromised state.   Neurological: Positive for weakness. Negative for dizziness, seizures, syncope, numbness and headaches.   Psychiatric/Behavioral: Negative for confusion, hallucinations and suicidal ideas. The patient is not nervous/anxious.    All other systems reviewed and are negative.    Objective:     Vital Signs (Most Recent):  Temp: 99 °F (37.2 °C) (04/29/19 0820)  Pulse: 89 (04/29/19 0850)  Resp: 18 (04/29/19 0850)  BP: (!) (P) 165/90 (04/29/19 0830)  SpO2: 97 % (04/29/19 0850) Vital Signs (24h Range):  Temp:  [99 °F (37.2 °C)-100.3 °F (37.9 °C)] 99 °F (37.2 °C)  Pulse:  [84-97] 89  Resp:  [16-23] 18  SpO2:  [87 %-98 %] 97 %  BP: (143-176)/(69-87) (P) 165/90     Weight: 95.3 kg (210 lb 1.6 oz)  Body mass index is 26.98 kg/m².      Intake/Output Summary (Last 24 hours) at 4/29/2019 0918  Last data filed at 4/29/2019 0600  Gross per 24 hour   Intake 740 ml   Output --   Net 740 ml       Physical Exam   Constitutional: He is oriented to person, place, and time. He appears well-developed and well-nourished. No distress.   HENT:   Head: Normocephalic and atraumatic.   Mouth/Throat:  Oropharynx is clear and moist.   Eyes: Pupils are equal, round, and reactive to light. Conjunctivae and EOM are normal. No scleral icterus.   Neck: Normal range of motion. Neck supple. No JVD present. No thyromegaly present.   Cardiovascular: Regular rhythm. Exam reveals no gallop and no friction rub.   Murmur heard.  tachycardia   Pulmonary/Chest: Effort normal. No respiratory distress. He has wheezes. He has no rales.   Abdominal: Soft. Bowel sounds are normal. He exhibits no distension. There is no tenderness. There is no guarding.   Musculoskeletal: Normal range of motion.   Neurological: He is oriented to person, place, and time. No cranial nerve deficit.   Skin: Skin is warm. Capillary refill takes less than 2 seconds. He is not diaphoretic. No erythema.   Nursing note and vitals reviewed.      Vents:  Oxygen Concentration (%): 32 (04/29/19 0850)    Lines/Drains/Airways     Drain                 Hemodialysis AV Graft 03/16/17 0859 Left upper arm 774 days          Peripheral Intravenous Line                 Peripheral IV - Single Lumen 04/28/19 2306 20 G Right Antecubital less than 1 day                Significant Labs:    CBC/Anemia Profile:  Recent Labs   Lab 04/28/19  2306 04/29/19  0308   WBC 5.50 5.67   HGB 8.2* 8.2*   HCT 24.7* 24.9*   * 103*   * 108*   RDW 14.5 14.5        Chemistries:  Recent Labs   Lab 04/28/19  2306 04/29/19  0309    138   K 4.4 4.4   CL 97 98   CO2 26 28   BUN 59* 59*   CREATININE 13.3* 13.9*   CALCIUM 10.1 10.2   ALBUMIN 3.3* 3.2*   PROT 8.1 8.1   BILITOT 0.5 0.6   ALKPHOS 76 75   ALT 37 36   AST 39 37   MG  --  2.2   PHOS  --  3.2       Bilirubin:   Recent Labs   Lab 04/17/19  1510 04/28/19  2306 04/29/19  0309   BILITOT 0.6 0.5 0.6     Coagulation:   Recent Labs   Lab 04/29/19  0308 04/29/19  0310   INR  --  1.1   APTT 32.5*  --      Lactic Acid:   Recent Labs   Lab 04/28/19  2325 04/29/19  0310   LACTATE 0.7 0.6     Troponin:   Recent Labs   Lab 04/28/19  6766  04/29/19  0309   TROPONINI 0.084* 0.083*       Significant Imaging:     CT chest without contrast: 04/29/19:  Scattered nodular opacities are seen throughout the lower lobes and, right upper lobe and right middle lobe with surrounding halo.  Largest conglomerate of nodules measures 2.5 cm within the right lower lobe and 1.4 cm within the right upper lobe.  Findings are thought to most likely be infectious in etiology.  Findings can reflect atypical infection such is aspergillosis or JOSE R.   Follow-up is recommended to exclude metastatic disease.    Pleura: Trace right pleural effusion within the fissures..No pleural calcification.  No pneumothorax.    Marcy/Mediastinum: Moderate mediastinal adenopathy which is indeterminate.    Esophagus: Normal.    Heart/pericardium: Moderate cardiomegaly.  Trace pericardial effusion..    Pulmonary vasculature: Pulmonary arteries distribute normally.  There are four pulmonary veins.    Aorta: Left-sided aortic arch with 3 arterial branches.  Status post aortic valve replacement.  The aorta maintains normal caliber, contour and course. There is moderate calcification of the thoracic aorta greater than would be expected for age.  There is  moderate coronary artery calcification.    Thoracic soft tissues: Normal. Both breasts are present.    Bones: No acute fracture. No suspicious lytic or sclerotic lesion.    Upper Abdomen: Splenomegaly.  Small hiatal hernia.  Mild constipation within the visualized large bowel.          Assessment/Plan:     * Pneumonia  Continue IV Zosyn and Vancomycin  Check Sputum culture  Follow sputum for AFB  May add IV Micafungin empirically  Complete 10 - 14 days total antibiotics        Thank you for your consult. Discussed diagnosis, its evaluation, treatment and usual course with hospital medicine. All questions answered. I will sign off. Please contact us if you have any additional questions.     Shimon Sheets MD  Pulmonology  [facility]  [place]

## 2019-04-29 NOTE — PROGRESS NOTES
"[facility] -   Adult Nutrition  Progress Note    SUMMARY       Recommendations    Recommendation/Intervention: 1. Continue current diet 2. Will continue to monitor  Goals: Meet >85% EEN/EPN while admitted  Nutrition Goal Status: new  Communication of RD Recs: POC, sticky note    Reason for Assessment    Reason For Assessment: identified at risk by screening criteria  Diagnosis:   1. ESRD (end stage renal disease) on dialysis    2. SOB (shortness of breath)    3. Pneumonia    4. PPD positive    5. Multiple lung nodules on CT        Relevant Medical History: CHF, ESRD, Hyperlipidemia, HTN,   General Information Comments: Pt on HD. Pt w/ providers at all attempted visits. NFPE to be performed at f/u  Nutrition Discharge Planning: Renal Diet    Nutrition Risk Screen    Nutrition Risk Screen: no indicators present    Nutrition/Diet History    Spiritual, Cultural Beliefs, Anglican Practices, Values that Affect Care: no    Anthropometrics    Temp: 99.3 °F (37.4 °C)  Height Method: Stated  Height: 6' 2" (188 cm)  Height (inches): 74 in  Weight Method: Bed Scale  Weight: 95.3 kg (210 lb 1.6 oz)  Weight (lb): 210.1 lb  Ideal Body Weight (IBW), Male: 190 lb  % Ideal Body Weight, Male (lb): 110.58 lb  BMI (Calculated): 27  BMI Grade: 25 - 29.9 - overweight       Lab/Procedures/Meds    Pertinent Labs Reviewed: reviewed  BMP  Lab Results   Component Value Date     04/29/2019    K 4.4 04/29/2019    CL 98 04/29/2019    CO2 28 04/29/2019    BUN 59 (H) 04/29/2019    CREATININE 13.9 (H) 04/29/2019    CALCIUM 10.2 04/29/2019    ANIONGAP 12 04/29/2019    ESTGFRAFRICA 4 (A) 04/29/2019    EGFRNONAA 4 (A) 04/29/2019     Lab Results   Component Value Date    CALCIUM 10.2 04/29/2019    PHOS 3.2 04/29/2019     Lab Results   Component Value Date    HGBA1C 4.4 04/29/2019     Lab Results   Component Value Date    ALBUMIN 3.2 (L) 04/29/2019     No results for input(s): POCTGLUCOSE in the last 24 hours.    Pertinent " Medications Reviewed: reviewed    Physical Findings/Assessment  Skin: WDL    Estimated/Assessed Needs    Weight Used For Calorie Calculations: 95.3 kg (210 lb 1.6 oz)  Energy Calorie Requirements (kcal): 3335(ESRD)  Energy Need Method: Kcal/kg  Protein Requirements: 124g(ESRD on HD)  Weight Used For Protein Calculations: 95.3 kg (210 lb 1.6 oz)     Estimated Fluid Requirement Method: RDA Method(or per MD)  RDA Method (mL): 3335         Nutrition Prescription Ordered    Current Diet Order: Renal Diet    Evaluation of Received Nutrient/Fluid Intake    Intake/Output Summary (Last 24 hours) at 4/29/2019 1314  Last data filed at 4/29/2019 1200  Gross per 24 hour   Intake 740 ml   Output 3615 ml   Net -2875 ml     % Intake of Estimated Energy Needs: 50 - 75 %  % Meal Intake: 50 - 75 %    Nutrition Risk  [id number]    Assessment and Plan  Nutrition Problem  Decreased mineral needs (Sodium, Phosphorus, Potassium)    Related to (etiology):   Current Medical Dx    Signs and Symptoms (as evidenced by):   ESRD    Interventions/Recommendations (treatment strategy):  See above    Nutrition Diagnosis Status:   New           Monitor and Evaluation    Food and Nutrient Intake: energy intake  Food and Nutrient Adminstration: diet order  Anthropometric Measurements: weight, weight change  Biochemical Data, Medical Tests and Procedures: inflammatory profile, electrolyte and renal panel, gastrointestinal profile, lipid profile, glucose/endocrine profile  Nutrition-Focused Physical Findings: overall appearance       Nutrition Follow-Up    RD Follow-up?: Yes

## 2019-04-29 NOTE — ASSESSMENT & PLAN NOTE
- Findings on CT suggestive of  atypical infection, fungal infection or neoplasm.   - check resp cx, fungal cx, AFB sputum q8h x 3, gold quantiferon, fungitell, cryptococcus serum Ag, histo ag, TB PCR, cocciodo Ab, urine strep Ag  - continue zosyn/vancomycin  - duonebs prn sob/wheezes  Pulmonology and Infectious Disease following

## 2019-04-29 NOTE — PLAN OF CARE
Problem: Adult Inpatient Plan of Care  Goal: Plan of Care Review  Observed pt lying in bed appears d/t continuing cough, able to verbalize needs pt progressing toward goal

## 2019-04-29 NOTE — HPI
50 y/o male with ESRD, on chronic HD at [facility] q MWF, HTN, hepatitis B (currently on active treatment), diastolic CHF, h/o of AVR 2017.  Patient also had hematuria status post negative CT urogram except for renal cyst.  Refused cystoscopy with  at consent in Urology.  In the last 2-3 weeks he has been having hemoptysis status post antibiotics.  He has positive TB skin test but negative Gold interferon test.  Came in last night with recurrent hemoptysis and shortness of breath.  Had a CT scan of the chest which showed fluid overload and admitted for TB precautions.  TB precautions were taken off this morning because of no evidence of tuberculosis on CT scan or on Gold interferon testing done recently.    Patient seen and examined at the bedside for evaluation of lung nodules along with pulmonary consultant-Dr. Chris Sheets.  Patient is also seen and examined during dialysis at the bedside

## 2019-04-29 NOTE — ASSESSMENT & PLAN NOTE
+ TB skin test  Negative gold interferon test  AFB cultures pending  Continue airborne isolation

## 2019-04-29 NOTE — SUBJECTIVE & OBJECTIVE
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     HD M, W, F    ESRD on dialysis     Hepatitis B     Hyperlipidemia     Hypertension     Secondary hyperparathyroidism of renal origin     Supraventricular tachycardia     atrial tachycardia    Tachycardia     Valvular regurgitation     AI, TR       Past Surgical History:   Procedure Laterality Date    ABLATION N/A 3/13/2017    Performed by Nigel [facility]allpa MD at [facility] CATH LAB    ASD REPAIR      age 8 [facility]    AV Graft Creation Left 03/2017    CARDIAC CATHETERIZATION      [facility]     CARDIAC VALVE SURGERY  02/08/2017    22 mm Medtronic AV, 28 mm TV Medtronic annuloplaty ring    [id number] Accuseal Left 3/16/2017    Performed by Alcides Manning MD at [facility] OR 2ND FLR    RADIOFREQUENCY ABLATION  03/13/2017    Atrial tachycardia    REDO STERNOTOMY WITH AORTIC VALVE REPLACEMENT/redo sternotomy N/A 2/8/2017    Performed by Jose Gastelum MD at [facility] OR 2ND FLR    REPAIR-VALVE-TRICUSPID  2/8/2017    Performed by Jose [facility]elum MD at [facility] OR 2ND FLR    REPLACEMENT-VALVE-AORTIC N/A 2/8/2017    Performed by Jose Gastelum MD at [facility] OR 2ND FLR       Review of patient's allergies indicates:  No Known Allergies      Scheduled Meds:   amLODIPine  10 mg Oral Daily    arformoterol  15 mcg Nebulization Q12H    budesonide  0.5 mg Nebulization Q12H    diltiaZEM  360 mg Oral Daily    doxycycline  100 mg Oral Q12H    epoetin vikki (PROCRIT) injection  10,000 Units Intravenous Once    furosemide  80 mg Oral Daily    pantoprazole  40 mg Intravenous Daily    piperacillin-tazobactam (ZOSYN) IVPB  4.5 g Intravenous Q12H    [START ON 5/8/2019] vancomycin (VANCOCIN) IVPB  1,500 mg Intravenous Q72H     Continuous  Infusions:  PRN Meds:.acetaminophen, albuterol-ipratropium, dextrose 50%, dextrose 50%, glucagon (human recombinant), glucose, glucose, heparin (porcine), ondansetron, promethazine (PHENERGAN) IVPB, sodium chloride 0.9%       Family History     Problem Relation (Age of Onset)    Anesthesia problems Paternal Uncle    Diabetes Paternal Aunt, Paternal Aunt    Heart attack Father    Heart failure Paternal Grandmother    Hypertension Paternal Aunt    Leukemia Mother, Paternal Aunt    No Known Problems Sister, Brother, Sister, Sister    Stroke Paternal Aunt    Suicide Paternal Uncle    Valvular heart disease Maternal Aunt        Tobacco Use    Smoking status: Never Smoker    Smokeless tobacco: Never Used   Substance and Sexual Activity    Alcohol use: No     Frequency: Never     Comment: quit in 2016; does have heavy drinking history; started drinking at age 13; was drinking a 12 pack over the weekend and two 24 ounces of beer a day during the week along with a pint of hard alcohol    Drug use: No    Sexual activity: Not on file         Review of Systems   Constitutional: Positive for chills, fatigue and fever. Negative for activity change, appetite change and diaphoresis.   HENT: Negative for facial swelling, sore throat, tinnitus and trouble swallowing.    Eyes: Negative for photophobia and visual disturbance.   Respiratory: Positive for cough and shortness of breath. Negative for apnea, chest tightness and wheezing.    Cardiovascular: Negative for chest pain, palpitations and leg swelling.   Gastrointestinal: Negative for abdominal distention, abdominal pain, constipation, diarrhea, nausea and vomiting.   Endocrine: Negative for polydipsia, polyphagia and polyuria.   Genitourinary: Negative for decreased urine volume, dysuria, flank pain, frequency and hematuria.   Musculoskeletal: Negative for arthralgias, back pain, joint swelling, myalgias and neck stiffness.   Skin: Negative for pallor and rash.    Allergic/Immunologic: Negative for immunocompromised state.   Neurological: Positive for weakness. Negative for dizziness, seizures, syncope, numbness and headaches.   Psychiatric/Behavioral: Negative for confusion, hallucinations and suicidal ideas. The patient is not nervous/anxious.    All other systems reviewed and are negative.    Objective:     Vital Signs (Most Recent):  Temp: 99 °F (37.2 °C) (04/29/19 0820)  Pulse: 89 (04/29/19 0850)  Resp: 18 (04/29/19 0850)  BP: (!) (P) 165/90 (04/29/19 0830)  SpO2: 97 % (04/29/19 0850) Vital Signs (24h Range):  Temp:  [99 °F (37.2 °C)-100.3 °F (37.9 °C)] 99 °F (37.2 °C)  Pulse:  [84-97] 89  Resp:  [16-23] 18  SpO2:  [87 %-98 %] 97 %  BP: (143-176)/(69-87) (P) 165/90     Weight: 95.3 kg (210 lb 1.6 oz)  Body mass index is 26.98 kg/m².      Intake/Output Summary (Last 24 hours) at 4/29/2019 0918  Last data filed at 4/29/2019 0600  Gross per 24 hour   Intake 740 ml   Output --   Net 740 ml       Physical Exam   Constitutional: He is oriented to person, place, and time. He appears well-developed and well-nourished. No distress.   HENT:   Head: Normocephalic and atraumatic.   Mouth/Throat: Oropharynx is clear and moist.   Eyes: Pupils are equal, round, and reactive to light. Conjunctivae and EOM are normal. No scleral icterus.   Neck: Normal range of motion. Neck supple. No JVD present. No thyromegaly present.   Cardiovascular: Regular rhythm. Exam reveals no gallop and no friction rub.   Murmur heard.  tachycardia   Pulmonary/Chest: Effort normal. No respiratory distress. He has wheezes. He has no rales.   Abdominal: Soft. Bowel sounds are normal. He exhibits no distension. There is no tenderness. There is no guarding.   Musculoskeletal: Normal range of motion.   Neurological: He is oriented to person, place, and time. No cranial nerve deficit.   Skin: Skin is warm. Capillary refill takes less than 2 seconds. He is not diaphoretic. No erythema.   Nursing note and vitals  reviewed.      Vents:  Oxygen Concentration (%): 32 (04/29/19 0850)    Lines/Drains/Airways     Drain                 Hemodialysis AV Graft 03/16/17 0859 Left upper arm 774 days          Peripheral Intravenous Line                 Peripheral IV - Single Lumen 04/28/19 2306 20 G Right Antecubital less than 1 day                Significant Labs:    CBC/Anemia Profile:  Recent Labs   Lab 04/28/19  2306 04/29/19  0308   WBC 5.50 5.67   HGB 8.2* 8.2*   HCT 24.7* 24.9*   * 103*   * 108*   RDW 14.5 14.5        Chemistries:  Recent Labs   Lab 04/28/19  2306 04/29/19  0309    138   K 4.4 4.4   CL 97 98   CO2 26 28   BUN 59* 59*   CREATININE 13.3* 13.9*   CALCIUM 10.1 10.2   ALBUMIN 3.3* 3.2*   PROT 8.1 8.1   BILITOT 0.5 0.6   ALKPHOS 76 75   ALT 37 36   AST 39 37   MG  --  2.2   PHOS  --  3.2       Bilirubin:   Recent Labs   Lab 04/17/19  1510 04/28/19  2306 04/29/19  0309   BILITOT 0.6 0.5 0.6     Coagulation:   Recent Labs   Lab 04/29/19  0308 04/29/19  0310   INR  --  1.1   APTT 32.5*  --      Lactic Acid:   Recent Labs   Lab 04/28/19  2325 04/29/19  0310   LACTATE 0.7 0.6     Troponin:   Recent Labs   Lab 04/28/19  2306 04/29/19  0309   TROPONINI 0.084* 0.083*       Significant Imaging:     CT chest without contrast: 04/29/19:  Scattered nodular opacities are seen throughout the lower lobes and, right upper lobe and right middle lobe with surrounding halo.  Largest conglomerate of nodules measures 2.5 cm within the right lower lobe and 1.4 cm within the right upper lobe.  Findings are thought to most likely be infectious in etiology.  Findings can reflect atypical infection such is aspergillosis or JOSE R.   Follow-up is recommended to exclude metastatic disease.    Pleura: Trace right pleural effusion within the fissures..No pleural calcification.  No pneumothorax.    Marcy/Mediastinum: Moderate mediastinal adenopathy which is indeterminate.    Esophagus: Normal.    Heart/pericardium: Moderate  cardiomegaly.  Trace pericardial effusion..    Pulmonary vasculature: Pulmonary arteries distribute normally.  There are four pulmonary veins.    Aorta: Left-sided aortic arch with 3 arterial branches.  Status post aortic valve replacement.  The aorta maintains normal caliber, contour and course. There is moderate calcification of the thoracic aorta greater than would be expected for age.  There is  moderate coronary artery calcification.    Thoracic soft tissues: Normal. Both breasts are present.    Bones: No acute fracture. No suspicious lytic or sclerotic lesion.    Upper Abdomen: Splenomegaly.  Small hiatal hernia.  Mild constipation within the visualized large bowel.

## 2019-04-29 NOTE — ASSESSMENT & PLAN NOTE
- INR 1.1  - will start heparin gtt to [facility] for warfarin and also possible need for bronchoscopy/other procedure

## 2019-04-29 NOTE — HPI
49M h/o AS s/p AVR, ESRD, hepatitis B, and Afib presents with SOB for past 2-3 weeks.  Associated with productive cough, hemoptysis, cold sweats, fevers and chills.    Patient denies any CP, palpations, HA, lightheadedness, dizziness, numbness, localized weakness, rhinorrhea, dysuria, hematurias, abdominal pain, n/v, sinus pain, ear pain, and all other sxs at this time. No further complaints or concerns at this time.   Patient had seen PCP in clinic and PPD injected 5 days ago in right forearm that was >1cm induration.   In ER, CT chest without contrast statrad show  cluster of  tiny subsegmental nodules inferior right upper lobe and multiple clusters within the right middle lobe and right lower lobes, largest single nodule measures 13.5 mm in the inferior right lower lobe, multiple subcentimeter nodules left lower lobe,  larger nodules or clusters of nodules demonstrate surrounding groundglass halos consistent with a small amount of adjacent hemorrhage.  Patient given 4.5mg IV zosyn and 2.5g vancomycin IV.  Hospital medicine called for admission.

## 2019-04-29 NOTE — PLAN OF CARE
Patient received hd as ordered. Net removal 3115cc. No access issues. Tolerated slight leg cramps towards end of tx. Dr. Ivey visited during hd. Adm. epogen with hd as ordered.

## 2019-04-29 NOTE — SUBJECTIVE & OBJECTIVE
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     HD M, W, F    ESRD on dialysis     Hepatitis B     Hyperlipidemia     Hypertension     Secondary hyperparathyroidism of renal origin     Supraventricular tachycardia     atrial tachycardia    Tachycardia     Valvular regurgitation     AI, TR       Past Surgical History:   Procedure Laterality Date    ABLATION N/A 3/13/2017    Performed by Nigel Guallpa MD at [facility] CATH LAB    ASD REPAIR      age 8 [facility]    AV Graft Creation Left 03/2017    CARDIAC [facility]HETERIZATION      [facility]     CARDIAC VALVE SURGERY  02/08/2017    22 mm Medtronic AV, 28 mm TV Medtronic annuloplaty ring    [id number] Accuseal Left 3/16/2017    Performed by Alcides Manning MD at [facility] OR 2ND FLR    RADIOFREQUENCY ABLATION  03/13/2017    Atrial tachycardia    REDO STERNOTOMY WITH AORTIC VALVE REPLACEMENT/redo sternotomy N/A 2/8/2017    Performed by Jose Gastelum MD at [facility] OR 2ND FLR    REPAIR-VALVE-TRICUSPID  2/8/2017    Performed by Jose Gastelum MD at [facility] OR 2ND FLR    REPLACEMENT-VALVE-AORTIC N/A 2/8/2017    Performed by Jose Gastelum MD at [facility] OR 2ND FLR       Review of patient's allergies indicates:  No Known Allergies    Medications:  Medications Prior to Admission   Medication Sig    amLODIPine (NORVASC) 10 MG tablet Take 1 tablet (10 mg total) by mouth once daily.    amoxicillin-clavulanate 875-125mg (AUGMENTIN) 875-125 mg per tablet     ascorbic acid, vitamin C, (VITAMIN C) 500 MG tablet Take 1 tablet (500 mg total) by mouth 2 (two) times daily.    b complex vitamins tablet Take 1 tablet by mouth once daily.    calcium acetate (PHOSLO) 667 mg capsule Take 1 capsule (667 mg total) by mouth 3 (three) times  daily.    diltiaZEM (CARDIZEM CD) 360 MG 24 hr capsule Take 1 capsule (360 mg total) by mouth once daily.    epoetin vikki (PROCRIT) 10,000 unit/mL injection Inject 1 mL (10,000 Units total) into the skin every Mon, Wed, Fri. To be given with HD    furosemide (LASIX) 80 MG tablet TAKE ONE TABLET BY MOUTH EVERY DAY    ipratropium-albuterol (COMBIVENT)  mcg/actuation inhaler Inhale 1 puff into the lungs every 4 (four) hours as needed for Wheezing or Shortness of Breath. Rescue    lisinopril (PRINIVIL,ZESTRIL) 40 MG tablet TAKE ONE TABLET BY MOUTH EVERY DAY    multivitamin with minerals tablet Take 1 tablet by mouth once daily.    pantoprazole (PROTONIX) 40 MG tablet TAKE ONE TABLET BY MOUTH EVERY DAY    ETHAN-RACQUEL RX 1- mg-mg-mcg Tab TAKE ONE TABLET BY MOUTH EVERY DAY    tenofovir (VIREAD) 300 mg Tab Take 1 tablet (300 mg total) by mouth once a week. AFTER DIALYSIS    hydrOXYzine HCl (ATARAX) 25 MG tablet TAKE 1 TABLET BY MOUTH DAILY AS NEEDED FOR ITCHING.    labetalol (NORMODYNE) 100 MG tablet TAKE ONE TABLET BY MOUTH THREE TIMES DAILY AS NEEDED IF HEART RATE GREATER THAN 120    omega-3 fatty acids-vitamin E (FISH OIL) 1,000 mg Cap Take 1 capsule by mouth once daily.    warfarin (COUMADIN) 7.5 MG tablet Take 1 tablet (7.5 mg total) by mouth Daily.    zolpidem (AMBIEN) 5 MG Tab TAKE ONE TABLET BY MOUTH EACH EVENING AT BEDTIME AS NEEDED FOR INSOMNIA     Antibiotics (From admission, onward)    Start     Stop Route Frequency Ordered    05/06/19 2100  vancomycin 1.5gm/D5W 250ml PLACEHOLDER DOSE      -- IV Every 72 hours 04/29/19 0507    04/29/19 1300  piperacillin-tazobactam 4.5 g in dextrose 5 % 100 mL IVPB (ready to mix system)      -- IV Every 12 hours (non-standard times) 04/29/19 0217        Antifungals (From admission, onward)    Start     Stop Route Frequency Ordered    04/29/19 2100  voriconazole 200 mg/5 mL (40 mg/mL) suspension 200 mg      -- Oral Every 12 hours 04/29/19 6887         Antivirals (From admission, onward)    None           Immunization History   Administered Date(s) Administered    Influenza - Quadrivalent - PF 03/28/2019    PPD Test 02/13/2017    Pneumococcal Conjugate - 13 Valent 03/28/2019       Family History     Problem Relation (Age of Onset)    Anesthesia problems Paternal Uncle    Diabetes Paternal Aunt, Paternal Aunt    Heart attack Father    Heart failure Paternal Grandmother    Hypertension Paternal Aunt    Leukemia Mother, Paternal Aunt    No Known Problems Sister, Brother, Sister, Sister    Stroke Paternal Aunt    Suicide Paternal Uncle    Valvular heart disease Maternal Aunt        Social History     Socioeconomic History    Marital status: Single     Spouse name: Not on file    Number of children: Not on file    Years of education: Not on file    Highest education level: Not on file   Occupational History    Not on file   Social Needs    Financial resource strain: Not on file    Food insecurity:     Worry: Not on file     Inability: Not on file    Transportation needs:     Medical: Not on file     Non-medical: Not on file   Tobacco Use    Smoking status: Never Smoker    Smokeless tobacco: Never Used   Substance and Sexual Activity    Alcohol use: No     Frequency: Never     Comment: quit in 2016; does have heavy drinking history; started drinking at age 13; was drinking a 12 pack over the weekend and two 24 ounces of beer a day during the week along with a pint of hard alcohol    Drug use: No    Sexual activity: Not on file   Lifestyle    Physical activity:     Days per week: Not on file     Minutes per session: Not on file    Stress: Not on file   Relationships    Social connections:     Talks on phone: Not on file     Gets together: Not on file     Attends Druze service: Not on file     Active member of club or organization: Not on file     Attends meetings of clubs or organizations: Not on file     Relationship status: Not on file    Other Topics Concern    Not on file   Social History Narrative    Not on file     Review of Systems   Constitutional: Negative.  Negative for activity change, appetite change, chills, diaphoresis, fatigue and fever.   HENT: Negative.  Negative for congestion and trouble swallowing.    Eyes: Negative.    Respiratory: Positive for cough and shortness of breath. Negative for chest tightness and wheezing.         Hemoptysis   Cardiovascular: Positive for leg swelling. Negative for chest pain and palpitations.   Gastrointestinal: Negative.  Negative for abdominal distention, abdominal pain, nausea and vomiting.   Genitourinary: Negative.  Negative for decreased urine volume, difficulty urinating, dysuria, enuresis, flank pain, frequency, hematuria, penile swelling, scrotal swelling and urgency.   Musculoskeletal: Negative.  Negative for arthralgias, back pain, joint swelling and neck pain.   Skin: Negative for rash.   Neurological: Negative.  Negative for tremors, seizures and headaches.   Psychiatric/Behavioral: Negative.  Negative for confusion and sleep disturbance. The patient is not nervous/anxious.      Objective:     Vital Signs (Most Recent):  Temp: (!) 101 °F (38.3 °C) (04/29/19 1729)  Pulse: 97 (04/29/19 1700)  Resp: 18 (04/29/19 1559)  BP: (!) 147/69 (04/29/19 1559)  SpO2: (!) 91 % (04/29/19 1559) Vital Signs (24h Range):  Temp:  [98.9 °F (37.2 °C)-101.7 °F (38.7 °C)] 101 °F (38.3 °C)  Pulse:  [84-97] 97  Resp:  [16-23] 18  SpO2:  [87 %-98 %] 91 %  BP: (125-180)/(67-90) 147/69     Weight: 95.3 kg (210 lb 1.6 oz)  Body mass index is 26.98 kg/m².    Estimated Creatinine Clearance: 7.6 mL/min (A) (based on SCr of 13.9 mg/dL (H)).    Physical Exam   Constitutional: He is oriented to person, place, and time. He appears well-developed and well-nourished. No distress.   HENT:   Head: Normocephalic and atraumatic.   Mouth/Throat: Oropharynx is clear and moist.   Eyes: Pupils are equal, round, and reactive to  light. Conjunctivae and EOM are normal. No scleral icterus.   Neck: Normal range of motion. Neck supple. No JVD present. No thyromegaly present.   Cardiovascular: Regular rhythm. Exam reveals no gallop and no friction rub.   Murmur heard.  Pulmonary/Chest: Effort normal. No respiratory distress. He has wheezes. He has rhonchi (mild). He has no rales.   Abdominal: Soft. Bowel sounds are normal. He exhibits no distension. There is no tenderness. There is no guarding.   Musculoskeletal: Normal range of motion.   Left upper arm fistula has a positive bruit and thrill   Neurological: He is oriented to person, place, and time. No cranial nerve deficit.   Skin: Skin is warm. Capillary refill takes less than 2 seconds. He is not diaphoretic. No erythema.   Nursing note and vitals reviewed.      Significant Labs:   Blood Culture:   Recent Labs   Lab 03/27/19  1200 03/27/19  1215   LABBLOO No growth after 5 days. No growth after 5 days.     BMP:   Recent Labs   Lab 04/29/19  0309   GLU 93      K 4.4   CL 98   CO2 28   BUN 59*   CREATININE 13.9*   CALCIUM 10.2   MG 2.2     Respiratory Culture:   Recent Labs   Lab 03/28/19  0744 04/29/19  0416   GSRESP >10epis/lfp and <than many WBC's  Predominance of oropharyngeal gaye. Please recollect. <10 epithelial cells per low power field.  Rare WBC's  Few Gram positive cocci  Rare Gram negative rods   RESPIRATORYC Specimen inadequate - culture not performed  --      Wound Culture: No results for input(s): LABAERO in the last 4320 hours.  All pertinent labs within the past 24 hours have been reviewed.    Significant Imaging: I have reviewed all pertinent imaging results/findings within the past 24 hours.

## 2019-04-29 NOTE — ASSESSMENT & PLAN NOTE
- consult nephrology in AM  - continue furosemide 80mg daily  Dialyzed at least 3 L, shortness of breath improved  Dialyzes on Monday,Wednesday, Friday

## 2019-04-29 NOTE — SIGNIFICANT EVENT
INR = 1.1    Coumadin resumed with Pharmacy to dose    EKG - sinus rhythm    Pt is refusing Lovenox injection - attempted to explain rationale of Lovenox however, still refusing      Temperature 101.7 noted      18:12 - pt agreeable to heparin infusion - labs ordered and heparin protocol prescribed

## 2019-04-29 NOTE — PROGRESS NOTES
Pharmacokinetic Initial Assessment: IV Vancomycin    Assessment/Plan:    Initiate intravenous vancomycin with loading dose of 2500 mg once with subsequent doses when random concentrations are less than 20 mcg/ml  Desired empiric serum trough concentration is 10 to 20 mcg/mL.  Draw vancomycin random level on 4/30 at 04:30.  Pharmacy will continue to follow and monitor vancomycin.      Please contact pharmacy at extension [phone] with any questions regarding this assessment.     Thank you for the consult,   Bipin Fernández     Patient brief summary:  Isidro White Jr. is a 49 y.o. male initiated on antimicrobial therapy with IV Vancomycin for treatment of suspected lower respiratory infection    Drug Allergies:   Review of patient's allergies indicates:  No Known Allergies    Actual Body Weight:   95.3    Renal Function:   Estimated Creatinine Clearance: 7.6 mL/min (A) (based on SCr of 13.9 mg/dL (H)).,     Dialysis Method (if applicable):  intermittent HD    CBC (last 72 hours):  Recent Labs   Lab Result Units 04/28/19 2306 04/29/19  0308   WBC K/uL 5.50 5.67   Hemoglobin g/dL 8.2* 8.2*   Hematocrit % 24.7* 24.9*   Platelets K/uL 111* 103*   Gran% % 60.4 58.5   Lymph% % 22.5 14.3*   Mono% % 8.2 18.5*   Eosinophil% % 8.9* 8.5*   Basophil% % 0.0 0.2   Differential Method  Automated Automated       Metabolic Panel (last 72 hours):  Recent Labs   Lab Result Units 04/28/19 2306 04/29/19  0309   Sodium mmol/L 137 138   Potassium mmol/L 4.4 4.4   Chloride mmol/L 97 98   CO2 mmol/L 26 28   Glucose mg/dL 87 93   BUN, Bld mg/dL 59* 59*   Creatinine mg/dL 13.3* 13.9*   Albumin g/dL 3.3* 3.2*   Total Bilirubin mg/dL 0.5 0.6   Alkaline Phosphatase U/L 76 75   AST U/L 39 37   ALT U/L 37 36   Magnesium mg/dL  --  2.2   Phosphorus mg/dL  --  3.2       Drug levels (last 3 results):  No results for input(s): VANCOMYCINRA, VANCOMYCINPE, VANCOMYCINTR in the last 72 hours.    Microbiologic Results:  Microbiology Results (last 7 days)        Procedure Component Value Units Date/Time    Fungus culture [[id number]] Collected:  04/29/19 0420    Order Status:  Sent Specimen:  Respiratory from Sputum Updated:  04/29/19 0420    Culture, Respiratory with Gram Stain [[id number]] Collected:  04/29/19 0416    Order Status:  Sent Specimen:  Respiratory from Sputum, Induced Updated:  04/29/19 0416    AFB Culture & Smear [[id number]] Collected:  04/29/19 0415    Order Status:  Sent Specimen:  Respiratory from Sputum, Induced Updated:  04/29/19 0416    Cryptococcal antigen [[id number]] Collected:  04/29/19 0308    Order Status:  Sent Specimen:  Blood Updated:  04/29/19 0311    Blood culture x two cultures. Draw prior to antibiotics. [[id number]] Collected:  04/28/19 2322    Order Status:  Sent Specimen:  Blood from Peripheral, Hand, Right Updated:  04/28/19 2326    Blood culture x two cultures. Draw prior to antibiotics. [[id number]] Collected:  04/28/19 2315    Order Status:  Sent Specimen:  Blood from Peripheral, Antecubital, Right Updated:  04/28/19 2326

## 2019-04-29 NOTE — PROGRESS NOTES
[facility] Hospital Medicine  Progress Note    Patient Name: Isidro White Jr.  MRN: [id number]  Patient Class: IP- Inpatient   Admission Date: 4/28/2019  Length of Stay: 0 days  Attending Physician: Arelis Dickerson MD  Primary Care Provider: Jacquelyn Skelton MD        Subjective:     Principal Problem:Pneumonia    HPI:  49M h/o AS s/p AVR, ESRD, hepatitis B, and Afib presents with SOB for past 2-3 weeks.  Associated with productive cough, hemoptysis, cold sweats, fevers and chills.    Patient denies any CP, palpations, HA, lightheadedness, dizziness, numbness, localized weakness, rhinorrhea, dysuria, hematurias, abdominal pain, n/v, sinus pain, ear pain, and all other sxs at this time. No further complaints or concerns at this time.   Patient had seen PCP in clinic and PPD injected 5 days ago in right forearm that was >1cm induration.   In ER, CT chest without contrast statrad show  cluster of  tiny subsegmental nodules inferior right upper lobe and multiple clusters within the right middle lobe and right lower lobes, largest single nodule measures 13.5 mm in the inferior right lower lobe, multiple subcentimeter nodules left lower lobe,  larger nodules or clusters of nodules demonstrate surrounding groundglass halos consistent with a small amount of adjacent hemorrhage.  Patient given 4.5mg IV zosyn and 2.5g vancomycin IV.  Hospital medicine called for admission.      Hospital Course:  Patient with ESRD on hemodialysis presents with shortness of breath that worsened. CT was concerning as Scattered nodular opacities are seen throughout the lower lobes and, right upper lobe and right middle lobe with surrounding halo.  Largest conglomerate of nodules measures 2.5 cm within the right lower lobe and 1.4 cm within the right upper lobe.  Findings are thought to most likely be infectious in etiology.  Findings can reflect atypical infection such is aspergillosis or JOSE R.   Follow-up is recommended to  exclude metastatic disease. CT was suggestive of atypical infection possibly fungal infection or neoplasm.  Empiric antibiotics of Zosyn and vancomycin given.  Doxycycline given for atypical coverage.  Sputum for AFB was ordered along with QuantiFERON gold and cryptococcus serum.  Infectious Disease saw the patient and felt that there was no tuberculosis however stated that we need to rule out as the patient was incarcerated and was having hemoptysis.  Pulmonology saw the patient and agreed with treatment plan.  Recommended possible need for IV micafungin empirically.  He recommended a total of 14 days of antibiotics.  Nephrology saw the patient and felt that symptoms were secondary to volume overload.  Patient was dialyzed today and had significant improvement in shortness of breath.  Patient with atrial fibrillation with rhythm sinus rhythm at present, rate controlled.  Also, with mechanical aortic valve.  Coumadin restarted and will bridge with Lovenox 1 mg/kg (renal dose) with plans for possible discharge tomorrow 4/30/19.     Interval History:  Shortness of breath has improved approximately 60%.  Patient describes uncontrollable cough and requesting cough suppressant.    Review of Systems   Constitutional: Negative.  Negative for activity change, appetite change, chills, diaphoresis, fatigue and fever.   HENT: Negative.  Negative for congestion and trouble swallowing.    Eyes: Negative.    Respiratory: Positive for cough and shortness of breath. Negative for chest tightness and wheezing.         Hemoptysis   Cardiovascular: Positive for leg swelling. Negative for chest pain and palpitations.   Gastrointestinal: Negative.  Negative for abdominal distention, abdominal pain, nausea and vomiting.   Genitourinary: Negative.  Negative for decreased urine volume, difficulty urinating, dysuria, enuresis, flank pain, frequency, hematuria, penile swelling, scrotal swelling and urgency.   Musculoskeletal: Negative.   Negative for arthralgias, back pain, joint swelling and neck pain.   Skin: Negative for rash.   Neurological: Negative.  Negative for tremors, seizures and headaches.   Psychiatric/Behavioral: Negative.  Negative for confusion and sleep disturbance. The patient is not nervous/anxious.      Objective:     Vital Signs (Most Recent):  Temp: 99.3 °F (37.4 °C) (04/29/19 1220)  Pulse: 92 (04/29/19 1436)  Resp: 17 (04/29/19 1436)  BP: 136/74 (04/29/19 1220)  SpO2: (!) 94 % (04/29/19 1436) Vital Signs (24h Range):  Temp:  [98.9 °F (37.2 °C)-100.3 °F (37.9 °C)] 99.3 °F (37.4 °C)  Pulse:  [84-97] 92  Resp:  [16-23] 17  SpO2:  [87 %-98 %] 94 %  BP: (125-180)/(67-90) 136/74     Weight: 95.3 kg (210 lb 1.6 oz)  Body mass index is 26.98 kg/m².    Intake/Output Summary (Last 24 hours) at 4/29/2019 1445  Last data filed at 4/29/2019 1200  Gross per 24 hour   Intake 740 ml   Output 3615 ml   Net -2875 ml      Physical Exam   Constitutional: He is oriented to person, place, and time. He appears well-developed and well-nourished. No distress.   HENT:   Head: Normocephalic and atraumatic.   Mouth/Throat: Oropharynx is clear and moist.   Eyes: Pupils are equal, round, and reactive to light. Conjunctivae and EOM are normal. No scleral icterus.   Neck: Normal range of motion. Neck supple. No JVD present. No thyromegaly present.   Cardiovascular: Regular rhythm. Exam reveals no gallop and no friction rub.   Murmur heard.  Pulmonary/Chest: Effort normal. No respiratory distress. He has wheezes. He has rhonchi (mild). He has no rales.   Abdominal: Soft. Bowel sounds are normal. He exhibits no distension. There is no tenderness. There is no guarding.   Musculoskeletal: Normal range of motion.   Left upper arm fistula has a positive bruit and thrill   Neurological: He is oriented to person, place, and time. No cranial nerve deficit.   Skin: Skin is warm. Capillary refill takes less than 2 seconds. He is not diaphoretic. No erythema.   Nursing  note and vitals reviewed.      Significant Labs:   CBC:   Recent Labs   Lab 04/28/19 2306 04/29/19  0308   WBC 5.50 5.67   HGB 8.2* 8.2*   HCT 24.7* 24.9*   * 103*     CMP:   Recent Labs   Lab 04/28/19 2306 04/29/19  0309    138   K 4.4 4.4   CL 97 98   CO2 26 28   GLU 87 93   BUN 59* 59*   CREATININE 13.3* 13.9*   CALCIUM 10.1 10.2   PROT 8.1 8.1   ALBUMIN 3.3* 3.2*   BILITOT 0.5 0.6   ALKPHOS 76 75   AST 39 37   ALT 37 36   ANIONGAP 14 12   EGFRNONAA 4* 4*     All pertinent labs within the past 24 hours have been reviewed.    Significant Imaging: I have reviewed all pertinent imaging results/findings within the past 24 hours.    Assessment/Plan:      * Pneumonia  - Findings on CT suggestive of  atypical infection, fungal infection or neoplasm.   - check resp cx, fungal cx, AFB sputum q8h x 3, gold quantiferon, fungitell, cryptococcus serum Ag, histo ag, TB PCR, cocciodo Ab, urine strep Ag  - continue zosyn/vancomycin  - duonebs prn sob/wheezes  Pulmonology and Infectious Disease following  Possible discharge 4/30/19        Tuberculosis  + TB skin test  Negative gold interferon test  AFB cultures pending - 2 specimens collected thus far  Continue airborne isolation        Atrial fibrillation  - continue diltiazem  - resume Coumadin-pharmacy to assist with dosing  EKG was sinus rhythm with controlled rate on admission  Continue telemetry monitoring  Daily PT INR        ESRD (end stage renal disease) on dialysis  - consult nephrology in AM  - continue furosemide 80mg daily  Dialyzed at least 3 L, shortness of breath improved  Dialyzes on Monday,Wednesday, Friday      S/P AVR (aortic valve replacement)  Mechanical valve  - INR 1.1  - Pharmacy to dose Coumadin  Lovenox 1 mg/kg to bridge Coumadin        Essential hypertension  - continue amlodipine, dilitazem  - hold lisinopril for now, restart if okay with nephrology and for BP coverage        VTE Risk Mitigation (From admission, onward)        Ordered      warfarin (COUMADIN) tablet 5 mg  Daily      04/29/19 1516     enoxaparin injection 100 mg  Every 24 hours (non-standard times)      04/29/19 1515     heparin (porcine) injection 3,000 Units  As needed (PRN)      04/29/19 0853     Place FRANDY hose  Until discontinued      04/29/19 0223     Place sequential compression device  Until discontinued      04/29/19 0223     IP VTE HIGH RISK PATIENT  Once      04/29/19 0223              Ana Serrano NP  Department of Hospital Medicine   [facility]

## 2019-04-29 NOTE — ASSESSMENT & PLAN NOTE
Hemodialysis started today for fluid overload, hemoptysis and ESRD.  Detailed discussion with infectious disease doctor Christopher Enciso , pulmonary physician .  No evidence of tuberculosis.  CT scan findings are more consistent with fluid overload than any infectious process.  Detailed discussion with the patient about plans of progressive ultrafiltration and decreasing the dry weight.  We will do dialysis today and tomorrow.  Follow-up with pulmonary as an outpatient.

## 2019-04-29 NOTE — PROGRESS NOTES
Pharmacy Brief Progress Note: Coumadin Education     Patient educated on warfarin indication, side effects, and drug interactions. Discussed importance of medication compliance and INR monitoring and reviewed signs of abnormal bleeding. Patient given warfarin educational handouts. Patient expressed understanding and had no further questions.    Thank you for allowing us to participate in this patient's care.   Katherine McArdle, Pharm.D. 4/29/2019 3:42 PM

## 2019-04-29 NOTE — SUBJECTIVE & OBJECTIVE
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     HD M, W, F    ESRD on dialysis     Hepatitis B     Hyperlipidemia     Hypertension     Secondary hyperparathyroidism of renal origin     Supraventricular tachycardia     atrial tachycardia    Tachycardia     Valvular regurgitation     AI, TR       Past Surgical History:   Procedure Laterality Date    ABLATION N/A 3/13/2017    Performed by Nigel Guallpa MD at [facility] CATH LAB    ASD REPAIR      age 8 [facility]    AV Graft Creation Left 03/2017    CARDIAC CATHETERIZATION      [facility]     CARDIAC VALVE SURGERY  02/08/2017    22 mm Medtronic AV, 28 mm TV Medtronic annuloplaty ring    [id number] Accuseal Left 3/16/2017    Performed by Alcides Manning MD at [facility] OR 2ND FLR    RADIOFREQUENCY ABLATION  03/13/2017    Atrial tachycardia    REDO STERNOTOMY WITH AORTIC VALVE REPLACEMENT/redo sternotomy N/A 2/8/2017    Performed by Jose Gastelum MD at [facility] OR 2ND FLR    REPAIR-VALVE-TRICUSPID  2/8/2017    Performed by Jose Gastelum MD at [facility] OR 2ND FLR    REPLACEMENT-VALVE-AORTIC N/A 2/8/2017    Performed by Jose Gastelum MD at [facility] OR [facility] FLR       Review of patient's allergies indicates:  No Known Allergies    No current facility-administered medications on file prior to encounter.      Current Outpatient Medications on File Prior to Encounter   Medication Sig    amLODIPine (NORVASC) 10 MG tablet Take 1 tablet (10 mg total) by mouth once daily.    amoxicillin-clavulanate 875-125mg (AUGMENTIN) 875-125 mg per tablet     ascorbic acid, vitamin C, (VITAMIN C) 500 MG tablet Take 1 tablet (500 mg total) by mouth 2 (two) times daily.    b complex vitamins tablet Take 1 tablet by mouth once daily.    calcium  acetate (PHOSLO) 667 mg capsule Take 1 capsule (667 mg total) by mouth 3 (three) times daily.    diltiaZEM (CARDIZEM CD) 360 MG 24 hr capsule Take 1 capsule (360 mg total) by mouth once daily.    epoetin vikki (PROCRIT) 10,000 unit/mL injection Inject 1 mL (10,000 Units total) into the skin every Mon, Wed, Fri. To be given with HD    furosemide (LASIX) 80 MG tablet TAKE ONE TABLET BY MOUTH EVERY DAY    ipratropium-albuterol (COMBIVENT)  mcg/actuation inhaler Inhale 1 puff into the lungs every 4 (four) hours as needed for Wheezing or Shortness of Breath. Rescue    lisinopril (PRINIVIL,ZESTRIL) 40 MG tablet TAKE ONE TABLET BY MOUTH EVERY DAY    multivitamin with minerals tablet Take 1 tablet by mouth once daily.    pantoprazole (PROTONIX) 40 MG tablet TAKE ONE TABLET BY MOUTH EVERY DAY    ETHAN-RACQUEL RX 1- mg-mg-mcg Tab TAKE ONE TABLET BY MOUTH EVERY DAY    tenofovir (VIREAD) 300 mg Tab Take 1 tablet (300 mg total) by mouth once a week. AFTER DIALYSIS    hydrOXYzine HCl (ATARAX) 25 MG tablet TAKE 1 TABLET BY MOUTH DAILY AS NEEDED FOR ITCHING.    labetalol (NORMODYNE) 100 MG tablet TAKE ONE TABLET BY MOUTH THREE TIMES DAILY AS NEEDED IF HEART RATE GREATER THAN 120    omega-3 fatty acids-vitamin E (FISH OIL) 1,000 mg Cap Take 1 capsule by mouth once daily.    warfarin (COUMADIN) 7.5 MG tablet Take 1 tablet (7.5 mg total) by mouth Daily.    zolpidem (AMBIEN) 5 MG Tab TAKE ONE TABLET BY MOUTH EACH EVENING AT BEDTIME AS NEEDED FOR INSOMNIA     Family History     Problem Relation (Age of Onset)    Anesthesia problems Paternal Uncle    Diabetes Paternal Aunt, Paternal Aunt    Heart attack Father    Heart failure Paternal Grandmother    Hypertension Paternal Aunt    Leukemia Mother, Paternal Aunt    No Known Problems Sister, Brother, Sister, Sister    Stroke Paternal Aunt    Suicide Paternal Uncle    Valvular heart disease Maternal Aunt        Tobacco Use    Smoking status: Never Smoker    Smokeless  tobacco: Never Used   Substance and Sexual Activity    Alcohol use: No     Frequency: Never     Comment: quit in 2016; does have heavy drinking history; started drinking at age 13; was drinking a 12 pack over the weekend and two 24 ounces of beer a day during the week along with a pint of hard alcohol    Drug use: No    Sexual activity: Not on file     Review of Systems   Constitutional: Positive for chills, fatigue and fever. Negative for activity change, appetite change and diaphoresis.   HENT: Negative for facial swelling, sore throat, tinnitus and trouble swallowing.    Eyes: Negative for photophobia and visual disturbance.   Respiratory: Positive for cough and shortness of breath. Negative for apnea, chest tightness and wheezing.    Cardiovascular: Negative for chest pain, palpitations and leg swelling.   Gastrointestinal: Negative for abdominal distention, abdominal pain, constipation, diarrhea, nausea and vomiting.   Endocrine: Negative for polydipsia, polyphagia and polyuria.   Genitourinary: Negative for decreased urine volume, dysuria, flank pain, frequency and hematuria.   Musculoskeletal: Negative for arthralgias, back pain, joint swelling, myalgias and neck stiffness.   Skin: Negative for pallor and rash.   Allergic/Immunologic: Negative for immunocompromised state.   Neurological: Positive for weakness. Negative for dizziness, seizures, syncope, numbness and headaches.   Psychiatric/Behavioral: Negative for confusion, hallucinations and suicidal ideas. The patient is not nervous/anxious.    All other systems reviewed and are negative.    Objective:     Vital Signs (Most Recent):  Temp: 99.6 °F (37.6 °C) (04/29/19 0155)  Pulse: 96 (04/29/19 0218)  Resp: (!) 23 (04/29/19 0218)  BP: (!) 147/71 (04/29/19 0218)  SpO2: 95 % (04/29/19 0218) Vital Signs (24h Range):  Temp:  [99.6 °F (37.6 °C)-100.3 °F (37.9 °C)] 99.6 °F (37.6 °C)  Pulse:  [93-97] 96  Resp:  [20-23] 23  SpO2:  [89 %-98 %] 95 %  BP:  (143-176)/(69-87) 147/71     Weight: 98.9 kg (218 lb)  Body mass index is 27.99 kg/m².    Physical Exam   Constitutional: He is oriented to person, place, and time. He appears well-developed and well-nourished. No distress.   HENT:   Head: Normocephalic and atraumatic.   Mouth/Throat: Oropharynx is clear and moist.   Eyes: Pupils are equal, round, and reactive to light. Conjunctivae and EOM are normal. No scleral icterus.   Neck: Normal range of motion. Neck supple. No JVD present. No thyromegaly present.   Cardiovascular: Regular rhythm. Exam reveals no gallop and no friction rub.   Murmur heard.  tachycardia   Pulmonary/Chest: Effort normal. No respiratory distress. He has wheezes. He has no rales.   Abdominal: Soft. Bowel sounds are normal. He exhibits no distension. There is no tenderness. There is no guarding.   Musculoskeletal: Normal range of motion.   Neurological: He is oriented to person, place, and time. No cranial nerve deficit.   Skin: Skin is warm. Capillary refill takes less than 2 seconds. He is not diaphoretic. No erythema.   Nursing note and vitals reviewed.        CRANIAL NERVES     CN III, IV, VI   Pupils are equal, round, and reactive to light.  Extraocular motions are normal.        Significant Labs:   CBC:   Recent Labs   Lab 04/28/19  2306   WBC 5.50   HGB 8.2*   HCT 24.7*   *     CMP:   Recent Labs   Lab 04/28/19  2306      K 4.4   CL 97   CO2 26   GLU 87   BUN 59*   CREATININE 13.3*   CALCIUM 10.1   PROT 8.1   ALBUMIN 3.3*   BILITOT 0.5   ALKPHOS 76   AST 39   ALT 37   ANIONGAP 14   EGFRNONAA 4*     Lactic Acid:   Recent Labs   Lab 04/28/19  2325   LACTATE 0.7     Troponin:   Recent Labs   Lab 04/28/19  2306   TROPONINI 0.084*     Urine Studies: No results for input(s): COLORU, APPEARANCEUA, PHUR, SPECGRAV, PROTEINUA, GLUCUA, KETONESU, BILIRUBINUA, OCCULTUA, NITRITE, UROBILINOGEN, LEUKOCYTESUR, RBCUA, WBCUA, BACTERIA, SQUAMEPITHEL, HYALINECASTS in the last 48 hours.    Invalid  input(s): [facility]  All pertinent labs within the past 24 hours have been reviewed.    Significant Imaging: I have reviewed all pertinent imaging results/findings within the past 24 hours.   Imaging Results          CT Chest Without Contrast (In process)                X-Ray Chest AP Portable (Final result)  Result time 04/28/19 23:36:18    Final result by Christopher Sue Jr., MD (04/28/19 23:36:18)                 Impression:      Improved right mid lung infiltrate.  Cardiomegaly.      Electronically signed by: Christopher Sue Jr., MD  Date:    04/28/2019  Time:    23:36             Narrative:    EXAMINATION:  XR CHEST AP PORTABLE    CLINICAL HISTORY:  Sepsis;    COMPARISON:  04/17/2017    FINDINGS:  Mildly improved infiltrate in the right mid lung.  Remaining lungs are clear.  Cardiomegaly with evidence of sternotomy and valve replacement.  No effusion or pneumothorax..  The remaining osseous structures and soft tissues are within normal limits.

## 2019-04-29 NOTE — HOSPITAL COURSE
Patient with ESRD on hemodialysis presents with shortness of breath that worsened. CT was concerning as Scattered nodular opacities are seen throughout the lower lobes and, right upper lobe and right middle lobe with surrounding halo.  Largest conglomerate of nodules measures 2.5 cm within the right lower lobe and 1.4 cm within the right upper lobe.  Findings are thought to most likely be infectious in etiology.  Findings can reflect atypical infection such is aspergillosis or JOSE R.   Follow-up is recommended to exclude metastatic disease. CT was suggestive of atypical infection possibly fungal infection or neoplasm.  Empiric antibiotics of Zosyn and vancomycin given.  Doxycycline given for atypical coverage.  Sputum for AFB was ordered along with QuantiFERON gold and cryptococcus serum.  Infectious Disease saw the patient and felt that there was no tuberculosis however stated that we need to rule out as the patient was incarcerated and was having hemoptysis.  Pulmonology saw the patient and agreed with treatment plan.  Recommended possible need for IV micafungin empirically.  He recommended a total of 14 days of antibiotics.  Nephrology saw the patient and felt that symptoms were secondary to volume overload.  Patient was dialyzed today and had significant improvement in shortness of breath.  Patient with atrial fibrillation with rhythm sinus rhythm at present, rate controlled.  Also, with mechanical aortic valve.  Coumadin restarted and will bridge with Lovenox 1 mg/kg (renal dose) with plans for possible discharge tomorrow 4/30/19. Discharge plans aborted- pt spiked temp 101.7 at 16:00 4/29. Also, he refused Lovenox injections. He agreed to receive heparin infusion however, he stated if he developed nose bleeds or any bleeding he would refuse. This AM, 4/30, pt had epistaxis last night and refused further anticoagulation with heparin. Risks discussed with patient regarding inappropriate anticoagulation and effects  on mechanical valve and atrial fibrillation. Pt still refused. Coumadin continued and INR 1.2. Preliminary AFB cultures - no acid fast bacteria. Sputum culture did not isolate specific bacteria. Vanc, Zosyn and Voriconazole continued. IS and Acapella prescribed. Repeat CT of Chest with contrast notes multifocal bronchopneumonia. After 3 days, pt's condition had improved. Pt was afebrile, vital signs stable and labs stable. TTE was negative for any valvular vegetations. Pt did qualify for oxygen which was delivered to room prior to discharge. Infectious Ds advised Augmentin 500 mg daily. Pt was seen and examined and determined to be safe and stable for discharge. Pt was advised to follow up with PCP and resume dialysis according to schedule.

## 2019-04-29 NOTE — PROCEDURES
"Isidro White Jr. is a 49 y.o. male patient.    Temp: 99 °F (37.2 °C) (04/29/19 0820)  Pulse: 84 (04/29/19 1140)  Resp: 20 (04/29/19 1140)  BP: 133/70 (04/29/19 1140)  SpO2: 97 % (04/29/19 0850)  Weight: 95.3 kg (210 lb 1.6 oz) (04/29/19 0329)  Height: 6' 2" (188 cm) (04/28/19 5312)       Prepare patient for dialysis  Date/Time: 4/29/2019 12:00 PM  Performed by: Jarett Ivey MD  Authorized by: Jarett Ivey MD     Hemodialysis inpatient If "per protocol" is selected for one or more ingredients (K+, Ca++, Na+, Bicarb) for the dialysate bath solution, select the hyperlink for the protocol instructions.  Date/Time: 4/29/2019 12:00 PM  Performed by: Jarett Ivey MD  Authorized by: Jarett Ivey MD       Patient Seen on HD ; HD is for ESRD ; HD orders written and reviewed with HD nurse and nursing staff ;   Access is functioning well ; UF Goal is 3 litres as tolerated ; Will Give Epogen for anemia ; F-180 dialyzer ;  DFR is 500 ; patient is tolerating HD well ; next HD will be scheduled based on daily rounding and patient's clinical situation     Parameters for Hypotension outlined in orders and communications with nurses       Jarett Ivey  4/29/2019    "

## 2019-04-29 NOTE — ASSESSMENT & PLAN NOTE
4/29- will do chest Cts can with contrast , agree with TB rule with AFB, send Gold quantiferon, add empiric Voriconazole , continue Vanco/zosyn

## 2019-04-29 NOTE — HPI
49 year old man with history of  AS s/p AVR, ESRD, hepatitis B, and Afib presents with SOB for past 2-3 weeks.  He was in [facility] many years ago and at this time reports night sweats, cough and occasional hemoptysis .   In ER, CT chest without contrast  show  cluster of  tiny subsegmental nodules inferior right upper lobe and multiple clusters within the right middle lobe and right lower lobes, largest single nodule measures 13.5 mm in the inferior right lower lobe, multiple subcentimeter nodules left lower lobe,  larger nodules or clusters of nodules demonstrate surrounding groundglass halos consistent with a small amount of adjacent hemorrhage.     He had negative Gold quantiferon -01/2019. Since admission, he is now persistently febrile-T max 101.7

## 2019-04-29 NOTE — ASSESSMENT & PLAN NOTE
- continue diltiazem  - resume Coumadin-pharmacy to assist with dosing  EKG was sinus rhythm with controlled rate on admission  Continue telemetry monitoring  Daily PT INR

## 2019-04-30 LAB
ALBUMIN SERPL BCP-MCNC: 3.2 G/DL (ref 3.5–5.2)
ALP SERPL-CCNC: 72 U/L (ref 55–135)
ALT SERPL W/O P-5'-P-CCNC: 42 U/L (ref 10–44)
ANION GAP SERPL CALC-SCNC: 17 MMOL/L (ref 8–16)
ANISOCYTOSIS BLD QL SMEAR: SLIGHT
APTT BLDCRRT: 25.6 SEC (ref 21–32)
APTT BLDCRRT: 25.6 SEC (ref 21–32)
APTT BLDCRRT: 35.9 SEC (ref 21–32)
AST SERPL-CCNC: 49 U/L (ref 10–40)
BASOPHILS # BLD AUTO: 0.01 K/UL (ref 0–0.2)
BASOPHILS NFR BLD: 0.2 % (ref 0–1.9)
BILIRUB SERPL-MCNC: 0.7 MG/DL (ref 0.1–1)
BUN SERPL-MCNC: 50 MG/DL (ref 6–20)
CALCIUM SERPL-MCNC: 9.5 MG/DL (ref 8.7–10.5)
CHLORIDE SERPL-SCNC: 96 MMOL/L (ref 95–110)
CO2 SERPL-SCNC: 21 MMOL/L (ref 23–29)
CREAT SERPL-MCNC: 12.6 MG/DL (ref 0.5–1.4)
DACRYOCYTES BLD QL SMEAR: ABNORMAL
DIFFERENTIAL METHOD: ABNORMAL
EOSINOPHIL # BLD AUTO: 0.1 K/UL (ref 0–0.5)
EOSINOPHIL NFR BLD: 1.2 % (ref 0–8)
ERYTHROCYTE [DISTWIDTH] IN BLOOD BY AUTOMATED COUNT: 14.4 % (ref 11.5–14.5)
EST. GFR  (AFRICAN AMERICAN): 5 ML/MIN/1.73 M^2
EST. GFR  (NON AFRICAN AMERICAN): 4 ML/MIN/1.73 M^2
GLUCOSE SERPL-MCNC: 94 MG/DL (ref 70–110)
HCT VFR BLD AUTO: 25.5 % (ref 40–54)
HGB BLD-MCNC: 8.5 G/DL (ref 14–18)
INR PPP: 1.2 (ref 0.8–1.2)
LYMPHOCYTES # BLD AUTO: 1.4 K/UL (ref 1–4.8)
LYMPHOCYTES NFR BLD: 25.7 % (ref 18–48)
MAGNESIUM SERPL-MCNC: 2.1 MG/DL (ref 1.6–2.6)
MCH RBC QN AUTO: 36.2 PG (ref 27–31)
MCHC RBC AUTO-ENTMCNC: 33.3 G/DL (ref 32–36)
MCV RBC AUTO: 109 FL (ref 82–98)
MONOCYTES # BLD AUTO: 0.5 K/UL (ref 0.3–1)
MONOCYTES NFR BLD: 9.3 % (ref 4–15)
NEUTROPHILS # BLD AUTO: 3.6 K/UL (ref 1.8–7.7)
NEUTROPHILS NFR BLD: 64 % (ref 38–73)
OVALOCYTES BLD QL SMEAR: ABNORMAL
PHOSPHATE SERPL-MCNC: 3.5 MG/DL (ref 2.7–4.5)
PLATELET # BLD AUTO: 94 K/UL (ref 150–350)
PLATELET BLD QL SMEAR: ABNORMAL
PMV BLD AUTO: 10.8 FL (ref 9.2–12.9)
POIKILOCYTOSIS BLD QL SMEAR: SLIGHT
POLYCHROMASIA BLD QL SMEAR: ABNORMAL
POTASSIUM SERPL-SCNC: 4.3 MMOL/L (ref 3.5–5.1)
PROT SERPL-MCNC: 8.3 G/DL (ref 6–8.4)
PROTHROMBIN TIME: 12.5 SEC (ref 9–12.5)
RBC # BLD AUTO: 2.35 M/UL (ref 4.6–6.2)
SODIUM SERPL-SCNC: 134 MMOL/L (ref 136–145)
VANCOMYCIN SERPL-MCNC: 34.7 UG/ML
WBC # BLD AUTO: 5.61 K/UL (ref 3.9–12.7)

## 2019-04-30 PROCEDURE — 25000003 PHARM REV CODE 250: Mod: NTX | Performed by: INTERNAL MEDICINE

## 2019-04-30 PROCEDURE — 85730 THROMBOPLASTIN TIME PARTIAL: CPT | Mod: 91,NTX

## 2019-04-30 PROCEDURE — 87116 MYCOBACTERIA CULTURE: CPT | Mod: NTX

## 2019-04-30 PROCEDURE — 80100014 HC HEMODIALYSIS 1:1: Mod: NTX

## 2019-04-30 PROCEDURE — 25000242 PHARM REV CODE 250 ALT 637 W/ HCPCS: Mod: NTX | Performed by: HOSPITALIST

## 2019-04-30 PROCEDURE — 25000003 PHARM REV CODE 250: Mod: NTX | Performed by: HOSPITALIST

## 2019-04-30 PROCEDURE — 27000646 HC AEROBIKA DEVICE: Mod: NTX

## 2019-04-30 PROCEDURE — 27000221 HC OXYGEN, UP TO 24 HOURS: Mod: NTX

## 2019-04-30 PROCEDURE — 99900035 HC TECH TIME PER 15 MIN (STAT): Mod: NTX

## 2019-04-30 PROCEDURE — 94664 DEMO&/EVAL PT USE INHALER: CPT | Mod: NTX

## 2019-04-30 PROCEDURE — C9113 INJ PANTOPRAZOLE SODIUM, VIA: HCPCS | Mod: NTX | Performed by: HOSPITALIST

## 2019-04-30 PROCEDURE — 25000242 PHARM REV CODE 250 ALT 637 W/ HCPCS: Mod: NTX | Performed by: NURSE PRACTITIONER

## 2019-04-30 PROCEDURE — 36415 COLL VENOUS BLD VENIPUNCTURE: CPT | Mod: NTX

## 2019-04-30 PROCEDURE — 87206 SMEAR FLUORESCENT/ACID STAI: CPT | Mod: NTX

## 2019-04-30 PROCEDURE — 21400001 HC TELEMETRY ROOM: Mod: NTX

## 2019-04-30 PROCEDURE — 85730 THROMBOPLASTIN TIME PARTIAL: CPT | Mod: NTX

## 2019-04-30 PROCEDURE — 94799 UNLISTED PULMONARY SVC/PX: CPT | Mod: NTX

## 2019-04-30 PROCEDURE — 87015 SPECIMEN INFECT AGNT CONCNTJ: CPT | Mod: NTX

## 2019-04-30 PROCEDURE — 90935 PR HEMODIALYSIS, ONE EVALUATION: ICD-10-PCS | Mod: NTX,,, | Performed by: INTERNAL MEDICINE

## 2019-04-30 PROCEDURE — 94640 AIRWAY INHALATION TREATMENT: CPT | Mod: NTX

## 2019-04-30 PROCEDURE — 85025 COMPLETE CBC W/AUTO DIFF WBC: CPT | Mod: NTX

## 2019-04-30 PROCEDURE — 80053 COMPREHEN METABOLIC PANEL: CPT | Mod: NTX

## 2019-04-30 PROCEDURE — 84100 ASSAY OF PHOSPHORUS: CPT | Mod: NTX

## 2019-04-30 PROCEDURE — 63600175 PHARM REV CODE 636 W HCPCS: Mod: NTX | Performed by: HOSPITALIST

## 2019-04-30 PROCEDURE — 80202 ASSAY OF VANCOMYCIN: CPT | Mod: NTX

## 2019-04-30 PROCEDURE — 85610 PROTHROMBIN TIME: CPT | Mod: NTX

## 2019-04-30 PROCEDURE — 90935 HEMODIALYSIS ONE EVALUATION: CPT | Mod: NTX,,, | Performed by: INTERNAL MEDICINE

## 2019-04-30 PROCEDURE — 83735 ASSAY OF MAGNESIUM: CPT | Mod: NTX

## 2019-04-30 RX ORDER — OXYMETAZOLINE HCL 0.05 %
2 SPRAY, NON-AEROSOL (ML) NASAL 2 TIMES DAILY
Status: DISCONTINUED | OUTPATIENT
Start: 2019-04-30 | End: 2019-05-01 | Stop reason: HOSPADM

## 2019-04-30 RX ORDER — MUPIROCIN 20 MG/G
OINTMENT TOPICAL 2 TIMES DAILY
Status: DISCONTINUED | OUTPATIENT
Start: 2019-04-30 | End: 2019-04-30

## 2019-04-30 RX ORDER — SODIUM CHLORIDE 9 MG/ML
INJECTION, SOLUTION INTRAVENOUS
Status: CANCELLED | OUTPATIENT
Start: 2019-04-30

## 2019-04-30 RX ORDER — SODIUM CHLORIDE 9 MG/ML
INJECTION, SOLUTION INTRAVENOUS ONCE
Status: CANCELLED | OUTPATIENT
Start: 2019-04-30 | End: 2019-04-30

## 2019-04-30 RX ADMIN — PANTOPRAZOLE SODIUM 40 MG: 40 INJECTION, POWDER, LYOPHILIZED, FOR SOLUTION INTRAVENOUS at 09:04

## 2019-04-30 RX ADMIN — AMLODIPINE BESYLATE 10 MG: 10 TABLET ORAL at 09:04

## 2019-04-30 RX ADMIN — IPRATROPIUM BROMIDE AND ALBUTEROL SULFATE 3 ML: .5; 3 SOLUTION RESPIRATORY (INHALATION) at 02:04

## 2019-04-30 RX ADMIN — PIPERACILLIN AND TAZOBACTAM 4.5 G: 4; .5 INJECTION, POWDER, LYOPHILIZED, FOR SOLUTION INTRAVENOUS; PARENTERAL at 01:04

## 2019-04-30 RX ADMIN — IPRATROPIUM BROMIDE AND ALBUTEROL SULFATE 3 ML: .5; 3 SOLUTION RESPIRATORY (INHALATION) at 08:04

## 2019-04-30 RX ADMIN — WARFARIN SODIUM 7.5 MG: 2.5 TABLET ORAL at 04:04

## 2019-04-30 RX ADMIN — PROMETHAZINE HYDROCHLORIDE 6.25 MG: 25 INJECTION INTRAMUSCULAR; INTRAVENOUS at 10:04

## 2019-04-30 RX ADMIN — VORICONAZOLE 200 MG: 40 POWDER, FOR SUSPENSION ORAL at 10:04

## 2019-04-30 RX ADMIN — ARFORMOTEROL TARTRATE 15 MCG: 15 SOLUTION RESPIRATORY (INHALATION) at 08:04

## 2019-04-30 RX ADMIN — VORICONAZOLE 200 MG: 40 POWDER, FOR SUSPENSION ORAL at 09:04

## 2019-04-30 RX ADMIN — BUDESONIDE 0.5 MG: 0.5 SUSPENSION RESPIRATORY (INHALATION) at 08:04

## 2019-04-30 RX ADMIN — OXYMETAZOLINE HCL 2 SPRAY: 0.05 SPRAY NASAL at 04:04

## 2019-04-30 RX ADMIN — BENZONATATE 100 MG: 100 CAPSULE ORAL at 10:04

## 2019-04-30 RX ADMIN — FUROSEMIDE 80 MG: 80 TABLET ORAL at 09:04

## 2019-04-30 RX ADMIN — DILTIAZEM HYDROCHLORIDE 360 MG: 180 CAPSULE, COATED, EXTENDED RELEASE ORAL at 09:04

## 2019-04-30 NOTE — PROGRESS NOTES
Pt completed 2.5 hours of HD removing 1.7L, crit line used. Pt started cramping during HD UF turned off for 20mins. Pt stated stated he was fine when the UF was turned back on.No signs of distress noted. De accessed per p&p  Each site held for 5mins, no post bleeding noted.

## 2019-04-30 NOTE — PROCEDURES
"Isidro White Jr. is a 49 y.o. male patient.    Temp: 99.7 °F (37.6 °C) (04/30/19 0816)  Pulse: 93 (04/30/19 0859)  Resp: 18 (04/30/19 0859)  BP: (!) 144/68 (04/30/19 0816)  SpO2: (!) 92 % (04/30/19 0850)  Weight: 95.3 kg (210 lb 1.6 oz) (04/29/19 1307)  Height: 6' 2" (188 cm) (04/29/19 1307)       Hemodialysis inpatient If "per protocol" is selected for one or more ingredients (K+, Ca++, Na+, Bicarb) for the dialysate bath solution, select the hyperlink for the protocol instructions.  Date/Time: 4/30/2019 10:23 AM  Performed by: Jarett Ivey MD  Authorized by: Jarett Ivey MD     Prepare patient for dialysis  Date/Time: 4/30/2019 10:23 AM  Performed by: Jarett Ivey MD  Authorized by: Jarett Ivey MD        Patient seen and examined at the bedside.  Patient will be getting ultrafiltration only to treat fluid overload and hemoptysis.  Patient is on anticoagulation for his aortic valve.  Patient has agreed for nocturnal hemodialysis.    Jarett Ivey  4/30/2019  "

## 2019-04-30 NOTE — ASSESSMENT & PLAN NOTE
Mechanical valve  - INR 1.1  - Pharmacy to dose Coumadin  Lovenox 1 mg/kg to bridge Coumadin  4/30 - pt refuses Lovenox and Heparin bridge - explained risks and pt refuses  Daily PT/INR

## 2019-04-30 NOTE — PROGRESS NOTES
Pharmacy Coumadin Consult Note    INR=1.2 (trending up appropriately, 1.1 yesterday)  Lovenox bridge  Hx of Afib, aortic valve replacement    Goal INR=2-3  Home dose: Warfarin 7.5mg daily  Continue home dose.     Patient has been educated.    Thank you for allowing us to participate in this patient's care.  Lina Pitt, Pharm D 4/30/2019 11:17 AM

## 2019-04-30 NOTE — NURSING
Pt is consistently removing nasal cannula.  Saturations in mid 80s on room air.  Pt educated on the importance of keeping nasal cannula in place.  Pt verbalizes understanding.  Pt demonstrated correctly how to apply nasal cannula.

## 2019-04-30 NOTE — ASSESSMENT & PLAN NOTE
+ TB skin test  Negative gold interferon test  AFB cultures pending - preliminary was negative

## 2019-04-30 NOTE — NURSING
"Pt was on heparin drip from 5162-2504. Pt has had epistaxis (small to moderate amount intermittently) from 2100-now (0046). Pt has refused x3 to be put back onto the heparin drip stating "it's causing my nose bleed". Asked if pt would consider lovenox or heparin injections, pt refused. Will continue to monitor.   "

## 2019-04-30 NOTE — SUBJECTIVE & OBJECTIVE
Interval History: Has some HAY and productive cough. He describes weakness. To receive HD again. Will discuss plan with Infectious Ds. On supplemental oxygen to maintain sat > 92 %.    Review of Systems   Constitutional: Negative.  Negative for activity change, appetite change, chills, diaphoresis, fatigue and fever.   HENT: Positive for nosebleeds. Negative for congestion and trouble swallowing.    Eyes: Negative.    Respiratory: Positive for cough and shortness of breath. Negative for chest tightness and wheezing.         Hemoptysis   Cardiovascular: Positive for leg swelling. Negative for chest pain and palpitations.   Gastrointestinal: Negative.  Negative for abdominal distention, abdominal pain, nausea and vomiting.   Genitourinary: Negative.  Negative for decreased urine volume, difficulty urinating, dysuria, enuresis, flank pain, frequency, hematuria, penile swelling, scrotal swelling and urgency.   Musculoskeletal: Negative.  Negative for arthralgias, back pain, joint swelling and neck pain.   Skin: Negative for rash.   Neurological: Negative.  Negative for tremors, seizures and headaches.   Psychiatric/Behavioral: Negative.  Negative for confusion and sleep disturbance. The patient is not nervous/anxious.      Objective:     Vital Signs (Most Recent):  Temp: 99.6 °F (37.6 °C) (04/30/19 1400)  Pulse: 91 (04/30/19 1400)  Resp: 16 (04/30/19 1400)  BP: (!) 120/59 (04/30/19 1400)  SpO2: (!) 93 % (04/30/19 1400) Vital Signs (24h Range):  Temp:  [99 °F (37.2 °C)-101.7 °F (38.7 °C)] 99.6 °F (37.6 °C)  Pulse:  [] 91  Resp:  [16-20] 16  SpO2:  [82 %-94 %] 93 %  BP: (113-165)/(59-79) 120/59     Weight: 95.3 kg (210 lb 1.6 oz)  Body mass index is 26.98 kg/m².    Intake/Output Summary (Last 24 hours) at 4/30/2019 1522  Last data filed at 4/30/2019 1306  Gross per 24 hour   Intake 576 ml   Output 2205 ml   Net -1629 ml      Physical Exam   Constitutional: He is oriented to person, place, and time. He appears  well-developed and well-nourished. No distress.   HENT:   Head: Normocephalic and atraumatic.   Mouth/Throat: Oropharynx is clear and moist.   Eyes: Pupils are equal, round, and reactive to light. Conjunctivae and EOM are normal. No scleral icterus.   Neck: Normal range of motion. Neck supple. No JVD present. No thyromegaly present.   Cardiovascular: Regular rhythm. Exam reveals no gallop and no friction rub.   Murmur heard.  Pulmonary/Chest: Effort normal. No respiratory distress. He has wheezes. He has rhonchi (mild). He has no rales.   Abdominal: Soft. Bowel sounds are normal. He exhibits no distension. There is no tenderness. There is no guarding.   Musculoskeletal: Normal range of motion.   Left upper arm fistula has a positive bruit and thrill   Neurological: He is oriented to person, place, and time. No cranial nerve deficit.   Skin: Skin is warm. Capillary refill takes less than 2 seconds. He is not diaphoretic. No erythema.   Nursing note and vitals reviewed.      Significant Labs:   CBC:   Recent Labs   Lab 04/28/19  2306 04/29/19  0308 04/30/19  0506   WBC 5.50 5.67 5.61   HGB 8.2* 8.2* 8.5*   HCT 24.7* 24.9* 25.5*   * 103* 94*     CMP:   Recent Labs   Lab 04/28/19 2306 04/29/19  0309 04/30/19  0506    138 134*   K 4.4 4.4 4.3   CL 97 98 96   CO2 26 28 21*   GLU 87 93 94   BUN 59* 59* 50*   CREATININE 13.3* 13.9* 12.6*   CALCIUM 10.1 10.2 9.5   PROT 8.1 8.1 8.3   ALBUMIN 3.3* 3.2* 3.2*   BILITOT 0.5 0.6 0.7   ALKPHOS 76 75 72   AST 39 37 49*   ALT 37 36 42   ANIONGAP 14 12 17*   EGFRNONAA 4* 4* 4*     All pertinent labs within the past 24 hours have been reviewed.    Significant Imaging: I have reviewed all pertinent imaging results/findings within the past 24 hours.

## 2019-04-30 NOTE — ASSESSMENT & PLAN NOTE
- Findings on CT suggestive of  atypical infection, fungal infection or neoplasm.   - check resp cx, fungal cx, AFB sputum q8h x 3, gold quantiferon, fungitell, cryptococcus serum Ag, histo ag, TB PCR, cocciodo Ab, urine strep Ag  - continue zosyn/vancomycin  CT of Chest with IV contrast shows multifocal bronchopneumonia  - duonebs prn sob/wheezes  Pulmonology and Infectious Disease following  IS and Acapella

## 2019-04-30 NOTE — PROGRESS NOTES
Pharmacokinetic Assessment Follow Up: IV Vancomycin    Vancomycin serum concentration assessment(s):  Last dose (2500 mg loading dose) given yesterday @ 0214 AM  Random level @ 0506 this AM = 34.7 mcg/ml     Vancomycin Regimen Plan:  The pt will NOT receive a dose of vancomycin today due to pre-dialysis random level > 25 mcg/ml  We will recheck a random level tomorrow 05/01 @ 0430 with AM labs & re-dose according to hemodialysis protocol    Pharmacy will continue to follow and monitor vancomycin.    Please contact pharmacy at extension [phone] for questions regarding this assessment.    Thank you for the consult,   Katherine McArdle, Pharm.D. 4/30/2019 2:44 PM       Patient brief summary:  Isidro White Jr. is a 49 y.o. male initiated on antimicrobial therapy with IV vancomycin for empiric treatment. Will wait for echo results to rule out endocarditis. Dr. Dumont following.     Drug Allergies:   Review of patient's allergies indicates:  No Known Allergies    Actual Body Weight:   95.3 kg    Renal Function:   Estimated Creatinine Clearance: 8.3 mL/min (A) (based on SCr of 12.6 mg/dL (H)).,     Dialysis Method (if applicable):  intermittent HD -- received ultrafiltration only today to treat fluid overload & hemoptysis     CBC (last 72 hours):  Recent Labs   Lab Result Units 04/28/19 2306 04/29/19 0308 04/29/19 0311 04/30/19  0506   WBC K/uL 5.50 5.67  --  5.61   Hemoglobin g/dL 8.2* 8.2*  --  8.5*   Hemoglobin A1C %  --   --  4.4  --    Hematocrit % 24.7* 24.9*  --  25.5*   Platelets K/uL 111* 103*  --  94*   Gran% % 60.4 58.5  --  64.0   Lymph% % 22.5 14.3*  --  25.7   Mono% % 8.2 18.5*  --  9.3   Eosinophil% % 8.9* 8.5*  --  1.2   Basophil% % 0.0 0.2  --  0.2   Differential Method  Automated Automated  --  Automated       Metabolic Panel (last 72 hours):  Recent Labs   Lab Result Units 04/28/19 2306 04/29/19  0309 04/29/19  0419 04/30/19  0506   Sodium mmol/L 137 138  --  134*   Potassium mmol/L 4.4 4.4   --  4.3   Chloride mmol/L 97 98  --  96   CO2 mmol/L 26 28  --  21*   Glucose mg/dL 87 93  --  94   Glucose, UA   --   --  Negative  --    BUN, Bld mg/dL 59* 59*  --  50*   Creatinine mg/dL 13.3* 13.9*  --  12.6*   Creatinine, Random Ur mg/dL  --   --  44.5  --    Albumin g/dL 3.3* 3.2*  --  3.2*   Total Bilirubin mg/dL 0.5 0.6  --  0.7   Alkaline Phosphatase U/L 76 75  --  72   AST U/L 39 37  --  49*   ALT U/L 37 36  --  42   Magnesium mg/dL  --  2.2  --  2.1   Phosphorus mg/dL  --  3.2  --  3.5       Vancomycin Administrations:  vancomycin given in the last 96 hours                   vancomycin (VANCOCIN) 2,500 mg in dextrose 5 % 500 mL IVPB (mg) 2,500 mg New Bag 04/29/19 0214                Drug levels (last 3 results):  Recent Labs   Lab Result Units 04/30/19  0506   Vancomycin, Random ug/mL 34.7       Microbiologic Results:  Microbiology Results (last 7 days)     Procedure Component Value Units Date/Time    AFB Culture & Smear [[id number]] Collected:  04/29/19 0415    Order Status:  Completed Specimen:  Respiratory from Sputum, Induced Updated:  04/30/19 1434     AFB CULTURE STAIN No acid fast bacilli seen.    AFB Culture & Smear [[id number]] Collected:  04/29/19 1103    Order Status:  Completed Specimen:  Respiratory from Sputum, Induced Updated:  04/30/19 1434     AFB CULTURE STAIN No acid fast bacilli seen.    Blood culture x two cultures. Draw prior to antibiotics. [[id number]] Collected:  04/28/19 2315    Order Status:  Completed Specimen:  Blood from Peripheral, Antecubital, Right Updated:  04/30/19 1212     Blood Culture, Routine No Growth to date     Blood Culture, Routine No Growth to date    Narrative:       Aerobic and anaerobic    Blood culture x two cultures. Draw prior to antibiotics. [[id number]] Collected:  04/28/19 2322    Order Status:  Completed Specimen:  Blood from Peripheral, Hand, Right Updated:  04/30/19 1212     Blood Culture, Routine No Growth to date     Blood Culture, Routine No Growth  to date    Narrative:       Aerobic and anaerobic    AFB Culture & Smear [[id number]] Collected:  04/30/19 0714    Order Status:  Sent Specimen:  Respiratory from Sputum, Induced Updated:  04/30/19 0927    Culture, Respiratory with Gram Stain [[id number]] Collected:  04/29/19 0416    Order Status:  Completed Specimen:  Respiratory from Sputum, Induced Updated:  04/30/19 0849     Respiratory Culture Normal respiratory gaye     Gram Stain (Respiratory) <10 epithelial cells per low power field.     Gram Stain (Respiratory) Rare WBC's     Gram Stain (Respiratory) Few Gram positive cocci     Gram Stain (Respiratory) Rare Gram negative rods    Cryptococcal antigen [[id number]] Collected:  04/29/19 0308    Order Status:  Completed Specimen:  Blood Updated:  04/29/19 1310     Cryptococcal Ag, Blood Negative    Fungus culture [[id number]] Collected:  04/29/19 0420    Order Status:  Sent Specimen:  Respiratory from Sputum Updated:  04/29/19 0959

## 2019-04-30 NOTE — ASSESSMENT & PLAN NOTE
- continue diltiazem  - resume Coumadin-pharmacy to assist with dosing  EKG was sinus rhythm with controlled rate on admission  Continue telemetry monitoring  Daily PT INR  Pt refusing Lovenox and Heparin bridge - discussed risks against anticoagulation regarding CVA and malfunctioning mechanical aortic valve and pt continues to refuse stating he has been doing this long enough and he bleeds easily

## 2019-04-30 NOTE — PROGRESS NOTES
[facility] Hospital Medicine  Progress Note    Patient Name: Isidro White Jr.  MRN: [id number]  Patient Class: IP- Inpatient   Admission Date: 4/28/2019  Length of Stay: 1 days  Attending Physician: Arelis Dickerson MD  Primary Care Provider: Jacquelyn Skelton MD        Subjective:     Principal Problem:Pneumonia    HPI:  49M h/o AS s/p AVR, ESRD, hepatitis B, and Afib presents with SOB for past 2-3 weeks.  Associated with productive cough, hemoptysis, cold sweats, fevers and chills.    Patient denies any CP, palpations, HA, lightheadedness, dizziness, numbness, localized weakness, rhinorrhea, dysuria, hematurias, abdominal pain, n/v, sinus pain, ear pain, and all other sxs at this time. No further complaints or concerns at this time.   Patient had seen PCP in clinic and PPD injected 5 days ago in right forearm that was >1cm induration.   In ER, CT chest without contrast statrad show  cluster of  tiny subsegmental nodules inferior right upper lobe and multiple clusters within the right middle lobe and right lower lobes, largest single nodule measures 13.5 mm in the inferior right lower lobe, multiple subcentimeter nodules left lower lobe,  larger nodules or clusters of nodules demonstrate surrounding groundglass halos consistent with a small amount of adjacent hemorrhage.  Patient given 4.5mg IV zosyn and 2.5g vancomycin IV.  Hospital medicine called for admission.      Hospital Course:  Patient with ESRD on hemodialysis presents with shortness of breath that worsened. CT was concerning as Scattered nodular opacities are seen throughout the lower lobes and, right upper lobe and right middle lobe with surrounding halo.  Largest conglomerate of nodules measures 2.5 cm within the right lower lobe and 1.4 cm within the right upper lobe.  Findings are thought to most likely be infectious in etiology.  Findings can reflect atypical infection such is aspergillosis or JOSE R.   Follow-up is recommended to  exclude metastatic disease. CT was suggestive of atypical infection possibly fungal infection or neoplasm.  Empiric antibiotics of Zosyn and vancomycin given.  Doxycycline given for atypical coverage.  Sputum for AFB was ordered along with QuantiFERON gold and cryptococcus serum.  Infectious Disease saw the patient and felt that there was no tuberculosis however stated that we need to rule out as the patient was incarcerated and was having hemoptysis.  Pulmonology saw the patient and agreed with treatment plan.  Recommended possible need for IV micafungin empirically.  He recommended a total of 14 days of antibiotics.  Nephrology saw the patient and felt that symptoms were secondary to volume overload.  Patient was dialyzed today and had significant improvement in shortness of breath.  Patient with atrial fibrillation with rhythm sinus rhythm at present, rate controlled.  Also, with mechanical aortic valve.  Coumadin restarted and will bridge with Lovenox 1 mg/kg (renal dose) with plans for possible discharge tomorrow 4/30/19. Discharge plans aborted- pt spiked temp 101.7 at 16:00 4/29. Also, he refused Lovenox injections. He agreed to receive heparin infusion however, he stated if he developed nose bleeds or any bleeding he would refuse. This AM, 4/30, pt had epistaxis last night and refused further anticoagulation with heparin. Risks discussed with patient regarding inappropriate anticoagulation and effects on mechanical valve and atrial fibrillation. Pt still refused. Coumadin continued and INR 1.2. Preliminary AFB cultures - no acid fast bacteria. Sputum culture did not isolate specific bacteria. Vanc, Zosyn and Voriconazole continued. IS and Acapella prescribed. Repeat CT of Chest with contrast notes multifocal bronchopneumonia.     Interval History: Has some HAY and productive cough. He describes weakness. To receive HD again. Will discuss plan with Infectious Ds. On supplemental oxygen to maintain sat > 92  %.    Review of Systems   Constitutional: Negative.  Negative for activity change, appetite change, chills, diaphoresis, fatigue and fever.   HENT: Positive for nosebleeds. Negative for congestion and trouble swallowing.    Eyes: Negative.    Respiratory: Positive for cough and shortness of breath. Negative for chest tightness and wheezing.         Hemoptysis   Cardiovascular: Positive for leg swelling. Negative for chest pain and palpitations.   Gastrointestinal: Negative.  Negative for abdominal distention, abdominal pain, nausea and vomiting.   Genitourinary: Negative.  Negative for decreased urine volume, difficulty urinating, dysuria, enuresis, flank pain, frequency, hematuria, penile swelling, scrotal swelling and urgency.   Musculoskeletal: Negative.  Negative for arthralgias, back pain, joint swelling and neck pain.   Skin: Negative for rash.   Neurological: Negative.  Negative for tremors, seizures and headaches.   Psychiatric/Behavioral: Negative.  Negative for confusion and sleep disturbance. The patient is not nervous/anxious.      Objective:     Vital Signs (Most Recent):  Temp: 99.6 °F (37.6 °C) (04/30/19 1400)  Pulse: 91 (04/30/19 1400)  Resp: 16 (04/30/19 1400)  BP: (!) 120/59 (04/30/19 1400)  SpO2: (!) 93 % (04/30/19 1400) Vital Signs (24h Range):  Temp:  [99 °F (37.2 °C)-101.7 °F (38.7 °C)] 99.6 °F (37.6 °C)  Pulse:  [] 91  Resp:  [16-20] 16  SpO2:  [82 %-94 %] 93 %  BP: (113-165)/(59-79) 120/59     Weight: 95.3 kg (210 lb 1.6 oz)  Body mass index is 26.98 kg/m².    Intake/Output Summary (Last 24 hours) at 4/30/2019 1522  Last data filed at 4/30/2019 1306  Gross per 24 hour   Intake 576 ml   Output 2205 ml   Net -1629 ml      Physical Exam   Constitutional: He is oriented to person, place, and time. He appears well-developed and well-nourished. No distress.   HENT:   Head: Normocephalic and atraumatic.   Mouth/Throat: Oropharynx is clear and moist.   Eyes: Pupils are equal, round, and  reactive to light. Conjunctivae and EOM are normal. No scleral icterus.   Neck: Normal range of motion. Neck supple. No JVD present. No thyromegaly present.   Cardiovascular: Regular rhythm. Exam reveals no gallop and no friction rub.   Murmur heard.  Pulmonary/Chest: Effort normal. No respiratory distress. He has wheezes. He has rhonchi (mild). He has no rales.   Abdominal: Soft. Bowel sounds are normal. He exhibits no distension. There is no tenderness. There is no guarding.   Musculoskeletal: Normal range of motion.   Left upper arm fistula has a positive bruit and thrill   Neurological: He is oriented to person, place, and time. No cranial nerve deficit.   Skin: Skin is warm. Capillary refill takes less than 2 seconds. He is not diaphoretic. No erythema.   Nursing note and vitals reviewed.      Significant Labs:   CBC:   Recent Labs   Lab 04/28/19  2306 04/29/19  0308 04/30/19  0506   WBC 5.50 5.67 5.61   HGB 8.2* 8.2* 8.5*   HCT 24.7* 24.9* 25.5*   * 103* 94*     CMP:   Recent Labs   Lab 04/28/19  2306 04/29/19  0309 04/30/19  0506    138 134*   K 4.4 4.4 4.3   CL 97 98 96   CO2 26 28 21*   GLU 87 93 94   BUN 59* 59* 50*   CREATININE 13.3* 13.9* 12.6*   CALCIUM 10.1 10.2 9.5   PROT 8.1 8.1 8.3   ALBUMIN 3.3* 3.2* 3.2*   BILITOT 0.5 0.6 0.7   ALKPHOS 76 75 72   AST 39 37 49*   ALT 37 36 42   ANIONGAP 14 12 17*   EGFRNONAA 4* 4* 4*     All pertinent labs within the past 24 hours have been reviewed.    Significant Imaging: I have reviewed all pertinent imaging results/findings within the past 24 hours.    Assessment/Plan:      * Pneumonia  - Findings on CT suggestive of  atypical infection, fungal infection or neoplasm.   - check resp cx, fungal cx, AFB sputum q8h x 3, gold quantiferon, fungitell, cryptococcus serum Ag, histo ag, TB PCR, cocciodo Ab, urine strep Ag  - continue zosyn/vancomycin  CT of Chest with IV contrast shows multifocal bronchopneumonia  - duonebs prn sob/wheezes  Pulmonology and  Infectious Disease following  IS and Acapella        Tuberculosis  + TB skin test  Negative gold interferon test  AFB cultures pending - preliminary was negative          Atrial fibrillation  - continue diltiazem  - resume Coumadin-pharmacy to assist with dosing  EKG was sinus rhythm with controlled rate on admission  Continue telemetry monitoring  Daily PT INR  Pt refusing Lovenox and Heparin bridge - discussed risks against anticoagulation regarding CVA and malfunctioning mechanical aortic valve and pt continues to refuse stating he has been doing this long enough and he bleeds easily        ESRD (end stage renal disease) on dialysis  - consult nephrology in AM  - continue furosemide 80mg daily  Dialyzed at least 3 L, shortness of breath improved  Dialyzes on Monday,Wednesday, Friday      S/P AVR (aortic valve replacement)  Mechanical valve  - INR 1.1  - Pharmacy to dose Coumadin  Lovenox 1 mg/kg to bridge Coumadin  4/30 - pt refuses Lovenox and Heparin bridge - explained risks and pt refuses  Daily PT/INR        Essential hypertension  - continue amlodipine, dilitazem  - hold lisinopril for now, restart if okay with nephrology and for BP coverage        VTE Risk Mitigation (From admission, onward)        Ordered     warfarin tablet 7.5 mg  Daily      04/29/19 1536     heparin (porcine) injection 3,000 Units  As needed (PRN)      04/29/19 0853     Place FRANDY hose  Until discontinued      04/29/19 0223     Place sequential compression device  Until discontinued      04/29/19 0223     IP VTE HIGH RISK PATIENT  Once      04/29/19 0223              Ana Serrano NP  Department of Hospital Medicine   [facility]

## 2019-05-01 VITALS
BODY MASS INDEX: 24.69 KG/M2 | OXYGEN SATURATION: 93 % | RESPIRATION RATE: 18 BRPM | HEIGHT: 74 IN | DIASTOLIC BLOOD PRESSURE: 63 MMHG | HEART RATE: 102 BPM | WEIGHT: 192.38 LBS | SYSTOLIC BLOOD PRESSURE: 129 MMHG | TEMPERATURE: 98 F

## 2019-05-01 PROBLEM — A15.9 TUBERCULOSIS: Status: RESOLVED | Noted: 2019-04-29 | Resolved: 2019-05-01

## 2019-05-01 LAB
1,3 BETA GLUCAN SPEC-MCNC: <31 PG/ML
ALBUMIN SERPL BCP-MCNC: 2.8 G/DL (ref 3.5–5.2)
ANION GAP SERPL CALC-SCNC: 14 MMOL/L (ref 8–16)
BACTERIA SPEC AEROBE CULT: NORMAL
BACTERIA SPEC AEROBE CULT: NORMAL
BASOPHILS # BLD AUTO: 0.01 K/UL (ref 0–0.2)
BASOPHILS NFR BLD: 0.2 % (ref 0–1.9)
BUN SERPL-MCNC: 66 MG/DL (ref 6–20)
CALCIUM SERPL-MCNC: 9 MG/DL (ref 8.7–10.5)
CHLORIDE SERPL-SCNC: 98 MMOL/L (ref 95–110)
CO2 SERPL-SCNC: 22 MMOL/L (ref 23–29)
CREAT SERPL-MCNC: 16 MG/DL (ref 0.5–1.4)
DIASTOLIC DYSFUNCTION: YES
DIFFERENTIAL METHOD: ABNORMAL
EOSINOPHIL # BLD AUTO: 0.1 K/UL (ref 0–0.5)
EOSINOPHIL NFR BLD: 1.3 % (ref 0–8)
ERYTHROCYTE [DISTWIDTH] IN BLOOD BY AUTOMATED COUNT: 14.1 % (ref 11.5–14.5)
EST. GFR  (AFRICAN AMERICAN): 4 ML/MIN/1.73 M^2
EST. GFR  (NON AFRICAN AMERICAN): 3 ML/MIN/1.73 M^2
ESTIMATED PA SYSTOLIC PRESSURE: 57.15
GLUCOSE SERPL-MCNC: 98 MG/DL (ref 70–110)
GRAM STN SPEC: NORMAL
HCT VFR BLD AUTO: 23.8 % (ref 40–54)
HGB BLD-MCNC: 8 G/DL (ref 14–18)
INR PPP: 1.3 (ref 0.8–1.2)
L PNEUMO AG UR QL IA: NOT DETECTED
LYMPHOCYTES # BLD AUTO: 1.5 K/UL (ref 1–4.8)
LYMPHOCYTES NFR BLD: 24.6 % (ref 18–48)
M TB IFN-G CD4+ BCKGRND COR BLD-ACNC: 0.3 IU/ML
MCH RBC QN AUTO: 35.6 PG (ref 27–31)
MCHC RBC AUTO-ENTMCNC: 33.6 G/DL (ref 32–36)
MCV RBC AUTO: 106 FL (ref 82–98)
MITOGEN IGNF BCKGRD COR BLD-ACNC: 1.04 IU/ML
MITOGEN IGNF BCKGRD COR BLD-ACNC: NEGATIVE [IU]/ML
MONOCYTES # BLD AUTO: 0.6 K/UL (ref 0.3–1)
MONOCYTES NFR BLD: 10.3 % (ref 4–15)
NEUTROPHILS # BLD AUTO: 3.9 K/UL (ref 1.8–7.7)
NEUTROPHILS NFR BLD: 63.8 % (ref 38–73)
NIL: 0.61 IU/ML
PHOSPHATE SERPL-MCNC: 4.4 MG/DL (ref 2.7–4.5)
PLATELET # BLD AUTO: 90 K/UL (ref 150–350)
PMV BLD AUTO: 10.2 FL (ref 9.2–12.9)
POTASSIUM SERPL-SCNC: 4.2 MMOL/L (ref 3.5–5.1)
PROTHROMBIN TIME: 13.5 SEC (ref 9–12.5)
RBC # BLD AUTO: 2.25 M/UL (ref 4.6–6.2)
RETIRED EF AND QEF - SEE NOTES: 60 (ref 55–65)
SODIUM SERPL-SCNC: 134 MMOL/L (ref 136–145)
TB2 - NIL: 0.34 IU/ML
TRICUSPID VALVE REGURGITATION: ABNORMAL
VANCOMYCIN SERPL-MCNC: 30 UG/ML
WBC # BLD AUTO: 6.19 K/UL (ref 3.9–12.7)

## 2019-05-01 PROCEDURE — 93306 TTE W/DOPPLER COMPLETE: CPT | Mod: NTX

## 2019-05-01 PROCEDURE — 94664 DEMO&/EVAL PT USE INHALER: CPT | Mod: NTX

## 2019-05-01 PROCEDURE — 25000242 PHARM REV CODE 250 ALT 637 W/ HCPCS: Mod: NTX | Performed by: HOSPITALIST

## 2019-05-01 PROCEDURE — 99900035 HC TECH TIME PER 15 MIN (STAT): Mod: NTX

## 2019-05-01 PROCEDURE — 94640 AIRWAY INHALATION TREATMENT: CPT | Mod: NTX

## 2019-05-01 PROCEDURE — 94799 UNLISTED PULMONARY SVC/PX: CPT | Mod: NTX

## 2019-05-01 PROCEDURE — 90935 PR HEMODIALYSIS, ONE EVALUATION: ICD-10-PCS | Mod: NTX,,, | Performed by: INTERNAL MEDICINE

## 2019-05-01 PROCEDURE — 80202 ASSAY OF VANCOMYCIN: CPT | Mod: NTX

## 2019-05-01 PROCEDURE — 85025 COMPLETE CBC W/AUTO DIFF WBC: CPT | Mod: NTX

## 2019-05-01 PROCEDURE — 85610 PROTHROMBIN TIME: CPT | Mod: NTX

## 2019-05-01 PROCEDURE — 25000003 PHARM REV CODE 250: Mod: NTX | Performed by: HOSPITALIST

## 2019-05-01 PROCEDURE — 80069 RENAL FUNCTION PANEL: CPT | Mod: NTX

## 2019-05-01 PROCEDURE — 63600175 PHARM REV CODE 636 W HCPCS: Mod: NTX | Performed by: HOSPITALIST

## 2019-05-01 PROCEDURE — 27000221 HC OXYGEN, UP TO 24 HOURS: Mod: NTX

## 2019-05-01 PROCEDURE — 94761 N-INVAS EAR/PLS OXIMETRY MLT: CPT | Mod: NTX

## 2019-05-01 PROCEDURE — 25000003 PHARM REV CODE 250: Mod: NTX | Performed by: INTERNAL MEDICINE

## 2019-05-01 PROCEDURE — 80100014 HC HEMODIALYSIS 1:1: Mod: NTX

## 2019-05-01 PROCEDURE — 80100016 HC MAINTENANCE HEMODIALYSIS: Mod: NTX

## 2019-05-01 PROCEDURE — 25000242 PHARM REV CODE 250 ALT 637 W/ HCPCS: Mod: NTX | Performed by: NURSE PRACTITIONER

## 2019-05-01 PROCEDURE — 93306 TTE W/DOPPLER COMPLETE: CPT | Mod: 26,NTX,, | Performed by: INTERNAL MEDICINE

## 2019-05-01 PROCEDURE — 36415 COLL VENOUS BLD VENIPUNCTURE: CPT | Mod: NTX

## 2019-05-01 PROCEDURE — 90935 HEMODIALYSIS ONE EVALUATION: CPT | Mod: NTX,,, | Performed by: INTERNAL MEDICINE

## 2019-05-01 PROCEDURE — 93306 2D ECHO WITH COLOR FLOW DOPPLER: ICD-10-PCS | Mod: 26,NTX,, | Performed by: INTERNAL MEDICINE

## 2019-05-01 PROCEDURE — 63600175 PHARM REV CODE 636 W HCPCS: Mod: NTX | Performed by: INTERNAL MEDICINE

## 2019-05-01 RX ORDER — AMOXICILLIN AND CLAVULANATE POTASSIUM 500; 125 MG/1; MG/1
1 TABLET, FILM COATED ORAL DAILY
Status: DISCONTINUED | OUTPATIENT
Start: 2019-05-01 | End: 2019-05-01 | Stop reason: HOSPADM

## 2019-05-01 RX ORDER — AMOXICILLIN AND CLAVULANATE POTASSIUM 500; 125 MG/1; MG/1
1 TABLET, FILM COATED ORAL DAILY
Qty: 14 TABLET | Refills: 0 | Status: SHIPPED | OUTPATIENT
Start: 2019-05-01 | End: 2019-05-16

## 2019-05-01 RX ADMIN — DILTIAZEM HYDROCHLORIDE 360 MG: 180 CAPSULE, COATED, EXTENDED RELEASE ORAL at 03:05

## 2019-05-01 RX ADMIN — IPRATROPIUM BROMIDE AND ALBUTEROL SULFATE 3 ML: .5; 3 SOLUTION RESPIRATORY (INHALATION) at 08:05

## 2019-05-01 RX ADMIN — AMOXICILLIN AND CLAVULANATE POTASSIUM 500 MG: 500; 125 TABLET, FILM COATED ORAL at 03:05

## 2019-05-01 RX ADMIN — BUDESONIDE 0.5 MG: 0.5 SUSPENSION RESPIRATORY (INHALATION) at 08:05

## 2019-05-01 RX ADMIN — OXYMETAZOLINE HCL 2 SPRAY: 0.05 SPRAY NASAL at 03:05

## 2019-05-01 RX ADMIN — FUROSEMIDE 80 MG: 80 TABLET ORAL at 03:05

## 2019-05-01 RX ADMIN — ARFORMOTEROL TARTRATE 15 MCG: 15 SOLUTION RESPIRATORY (INHALATION) at 08:05

## 2019-05-01 RX ADMIN — PIPERACILLIN AND TAZOBACTAM 4.5 G: 4; .5 INJECTION, POWDER, LYOPHILIZED, FOR SOLUTION INTRAVENOUS; PARENTERAL at 01:05

## 2019-05-01 RX ADMIN — ERYTHROPOIETIN 4400 UNITS: 10000 INJECTION, SOLUTION INTRAVENOUS; SUBCUTANEOUS at 12:05

## 2019-05-01 RX ADMIN — IPRATROPIUM BROMIDE AND ALBUTEROL SULFATE 3 ML: .5; 3 SOLUTION RESPIRATORY (INHALATION) at 01:05

## 2019-05-01 RX ADMIN — AMLODIPINE BESYLATE 10 MG: 10 TABLET ORAL at 03:05

## 2019-05-01 NOTE — SUBJECTIVE & OBJECTIVE
Interval History: 49 year old man pneumonia .    Ct chest with contrast done 04/29- Peripheral airspace opacities remain present consistent with multifocal bronchopneumonia.  An embolic etiology may be considered.  No signs of cavitation.  Review of Systems   Constitutional: Negative.  Negative for activity change, appetite change, chills, diaphoresis, fatigue and fever.   HENT: Negative.  Negative for congestion and trouble swallowing.    Eyes: Negative.    Respiratory: Positive for cough and shortness of breath. Negative for chest tightness and wheezing.         Hemoptysis   Cardiovascular: Positive for leg swelling. Negative for chest pain and palpitations.   Gastrointestinal: Negative.  Negative for abdominal distention, abdominal pain, nausea and vomiting.   Genitourinary: Negative.  Negative for decreased urine volume, difficulty urinating, dysuria, enuresis, flank pain, frequency, hematuria, penile swelling, scrotal swelling and urgency.   Musculoskeletal: Negative.  Negative for arthralgias, back pain, joint swelling and neck pain.   Skin: Negative for rash.   Neurological: Negative.  Negative for tremors, seizures and headaches.   Psychiatric/Behavioral: Negative.  Negative for confusion and sleep disturbance. The patient is not nervous/anxious.      Objective:     Vital Signs (Most Recent):  Temp: 99.5 °F (37.5 °C) (05/01/19 0336)  Pulse: 94 (05/01/19 0801)  Resp: 20 (05/01/19 0801)  BP: 132/68 (05/01/19 0708)  SpO2: 95 % (05/01/19 0801) Vital Signs (24h Range):  Temp:  [98.5 °F (36.9 °C)-99.9 °F (37.7 °C)] 99.5 °F (37.5 °C)  Pulse:  [76-96] 94  Resp:  [16-20] 20  SpO2:  [86 %-95 %] 95 %  BP: (113-144)/(57-78) 132/68     Weight: 87.1 kg (192 lb 0.3 oz)  Body mass index is 24.65 kg/m².    Estimated Creatinine Clearance: 6.6 mL/min (A) (based on SCr of 16 mg/dL (H)).    Physical Exam   Constitutional: He is oriented to person, place, and time. He appears well-developed and well-nourished. No distress.    HENT:   Head: Normocephalic and atraumatic.   Mouth/Throat: Oropharynx is clear and moist.   Eyes: Pupils are equal, round, and reactive to light. Conjunctivae and EOM are normal. No scleral icterus.   Neck: Normal range of motion. Neck supple. No JVD present. No thyromegaly present.   Cardiovascular: Regular rhythm. Exam reveals no gallop and no friction rub.   Murmur heard.  Pulmonary/Chest: Effort normal. No respiratory distress. He has wheezes. He has rhonchi (mild). He has no rales.   Abdominal: Soft. Bowel sounds are normal. He exhibits no distension. There is no tenderness. There is no guarding.   Musculoskeletal: Normal range of motion.   Left upper arm fistula has a positive bruit and thrill   Neurological: He is oriented to person, place, and time. No cranial nerve deficit.   Skin: Skin is warm. Capillary refill takes less than 2 seconds. He is not diaphoretic. No erythema.   Nursing note and vitals reviewed.      Significant Labs:   Blood Culture:   Recent Labs   Lab 03/27/19  1200 03/27/19  1215 04/28/19  2315 04/28/19  2322   LABBLOO No growth after 5 days. No growth after 5 days. No Growth to date  No Growth to date No Growth to date  No Growth to date     CBC:   Recent Labs   Lab 04/30/19  0506 05/01/19  0343   WBC 5.61 6.19   HGB 8.5* 8.0*   HCT 25.5* 23.8*   PLT 94* 90*     Respiratory Culture:   Recent Labs   Lab 03/28/19  0744 04/29/19  0416   GSRESP >10epis/lfp and <than many WBC's  Predominance of oropharyngeal gaye. Please recollect. <10 epithelial cells per low power field.  Rare WBC's  Few Gram positive cocci  Rare Gram negative rods   RESPIRATORYC Specimen inadequate - culture not performed Normal respiratory gaye     Urine Culture: No results for input(s): LABURIN in the last 4320 hours.    Significant Imaging: I have reviewed all pertinent imaging results/findings within the past 24 hours.

## 2019-05-01 NOTE — DISCHARGE SUMMARY
[facility] Hospital Medicine  Discharge Summary      Patient Name: Isidro White Jr.  MRN: [id number]  Admission Date: 4/28/2019  Hospital Length of Stay: 2 days  Discharge Date and Time:  05/01/2019 4:39 PM  Attending Physician: Arelis Dickerson MD   Discharging Provider: Ana Serrano NP  Primary Care Provider: Jacquelyn Skelton MD      HPI:   49M h/o AS s/p AVR, ESRD, hepatitis B, and Afib presents with SOB for past 2-3 weeks.  Associated with productive cough, hemoptysis, cold sweats, fevers and chills.    Patient denies any CP, palpations, HA, lightheadedness, dizziness, numbness, localized weakness, rhinorrhea, dysuria, hematurias, abdominal pain, n/v, sinus pain, ear pain, and all other sxs at this time. No further complaints or concerns at this time.   Patient had seen PCP in clinic and PPD injected 5 days ago in right forearm that was >1cm induration.   In ER, CT chest without contrast statrad show  cluster of  tiny subsegmental nodules inferior right upper lobe and multiple clusters within the right middle lobe and right lower lobes, largest single nodule measures 13.5 mm in the inferior right lower lobe, multiple subcentimeter nodules left lower lobe,  larger nodules or clusters of nodules demonstrate surrounding groundglass halos consistent with a small amount of adjacent hemorrhage.  Patient given 4.5mg IV zosyn and 2.5g vancomycin IV.  Hospital medicine called for admission.      * No surgery found *      Hospital Course:   Patient with ESRD on hemodialysis presents with shortness of breath that worsened. CT was concerning as Scattered nodular opacities are seen throughout the lower lobes and, right upper lobe and right middle lobe with surrounding halo.  Largest conglomerate of nodules measures 2.5 cm within the right lower lobe and 1.4 cm within the right upper lobe.  Findings are thought to most likely be infectious in etiology.  Findings can reflect atypical infection such is  aspergillosis or JOSE R.   Follow-up is recommended to exclude metastatic disease. CT was suggestive of atypical infection possibly fungal infection or neoplasm.  Empiric antibiotics of Zosyn and vancomycin given.  Doxycycline given for atypical coverage.  Sputum for AFB was ordered along with QuantiFERON gold and cryptococcus serum.  Infectious Disease saw the patient and felt that there was no tuberculosis however stated that we need to rule out as the patient was incarcerated and was having hemoptysis.  Pulmonology saw the patient and agreed with treatment plan.  Recommended possible need for IV micafungin empirically.  He recommended a total of 14 days of antibiotics.  Nephrology saw the patient and felt that symptoms were secondary to volume overload.  Patient was dialyzed today and had significant improvement in shortness of breath.  Patient with atrial fibrillation with rhythm sinus rhythm at present, rate controlled.  Also, with mechanical aortic valve.  Coumadin restarted and will bridge with Lovenox 1 mg/kg (renal dose) with plans for possible discharge tomorrow 4/30/19. Discharge plans aborted- pt spiked temp 101.7 at 16:00 4/29. Also, he refused Lovenox injections. He agreed to receive heparin infusion however, he stated if he developed nose bleeds or any bleeding he would refuse. This AM, 4/30, pt had epistaxis last night and refused further anticoagulation with heparin. Risks discussed with patient regarding inappropriate anticoagulation and effects on mechanical valve and atrial fibrillation. Pt still refused. Coumadin continued and INR 1.2. Preliminary AFB cultures - no acid fast bacteria. Sputum culture did not isolate specific bacteria. Vanc, Zosyn and Voriconazole continued. IS and Acapella prescribed. Repeat CT of Chest with contrast notes multifocal bronchopneumonia. After 3 days, pt's condition had improved. Pt was afebrile, vital signs stable and labs stable. TTE was negative for any valvular  vegetations. Pt did qualify for oxygen which was delivered to room prior to discharge. Infectious Ds advised Augmentin 500 mg daily. Pt was seen and examined and determined to be safe and stable for discharge. Pt was advised to follow up with PCP and resume dialysis according to schedule.      Consults:   Consults (From admission, onward)        Status Ordering Provider     Inpatient consult to Infectious Diseases  Once     Provider:  Christopher Dumont MD    Acknowledged MARIXA TOBAR     Inpatient consult to Nephrology  Once     Provider:  Jarett Ivey MD    Acknowledged TOBARMARIXA JONES     Inpatient consult to Pulmonology  Once     Provider:  Shimon Sheets MD    Completed EKATERINA, CALVIN LINMARIXA     IP consult to case management  Once     Provider:  (Not yet assigned)    Completed MARIXA TOBAR     Pharmacy to dose Warfarin consult  Once     Provider:  (Not yet assigned)    Acknowledged NAYA GLASS            Final Active Diagnoses:    Diagnosis Date Noted POA    PRINCIPAL PROBLEM:  Pneumonia [J18.9] 03/27/2019 Yes    Atrial fibrillation [I48.91] 03/27/2019 Yes    ESRD (end stage renal disease) on dialysis [N18.6, Z99.2]  Not Applicable     Chronic    S/P AVR (aortic valve replacement) [Z95.2] 02/09/2017 Not Applicable    Essential hypertension [I10]  Yes      Problems Resolved During this Admission:    Diagnosis Date Noted Date Resolved POA    Tuberculosis [A15.9] 04/29/2019 05/01/2019 Yes       Discharged Condition: stable    Disposition: Home or Self Care    Follow Up:  Follow-up Information     Jacquelyn Skelton MD In 3 days.    Specialties:  Nephrology, Internal Medicine  Why:  Hosp Med - Pneumonia and ESRD on HD  Contact information:  [address] [place] [id number] [phone]             Dialysis.    Why:  Resume dialysis according to schedule               Patient Instructions:      OXYGEN FOR HOME USE     Order Specific Question Answer Comments   Liter Flow 2    Duration Continuous   "  Qualifying SpO2: 88    Testing done at: Exercise/Activity    Portable mode: pulse dose acceptable    Device home concentrator with portable unit    Length of need (in months): 99 mos    Patient condition with qualifying saturation CHF    Height: 6' 2" (1.88 m)    Weight: 87.1 kg (192 lb 0.3 oz)    Does patient have medical equipment at home? none    Alternative treatment measures have been tried or considered and deemed clinically ineffective. Yes      Notify your health care provider if you experience any of the following:  temperature >100.4     Notify your health care provider if you experience any of the following:  difficulty breathing or increased cough     Activity as tolerated     Activity as tolerated       Significant Diagnostic Studies: Labs:   CMP   Recent Labs   Lab 04/30/19  0506 05/01/19  0343   * 134*   K 4.3 4.2   CL 96 98   CO2 21* 22*   GLU 94 98   BUN 50* 66*   CREATININE 12.6* 16.0*   CALCIUM 9.5 9.0   PROT 8.3  --    ALBUMIN 3.2* 2.8*   BILITOT 0.7  --    ALKPHOS 72  --    AST 49*  --    ALT 42  --    ANIONGAP 17* 14   ESTGFRAFRICA 5* 4*   EGFRNONAA 4* 3*    and CBC   Recent Labs   Lab 04/30/19  0506 05/01/19  0343   WBC 5.61 6.19   HGB 8.5* 8.0*   HCT 25.5* 23.8*   PLT 94* 90*       Pending Diagnostic Studies:     Procedure Component Value Units Date/Time    Coccidioides Antibody, Serum [[id number]] Collected:  04/29/19 0309    Order Status:  Sent Lab Status:  In process Updated:  04/29/19 1001    Specimen:  Blood     Histoplasma antigen, serum [[id number]] Collected:  04/29/19 0309    Order Status:  Sent Lab Status:  In process Updated:  04/29/19 1001    Specimen:  Blood          Medications:  Reconciled Home Medications:      Medication List      START taking these medications    amoxicillin-clavulanate 500-125mg 500-125 mg Tab  Commonly known as:  AUGMENTIN  Take 1 tablet (500 mg total) by mouth once daily. for 14 days  Replaces:  amoxicillin-clavulanate 875-125mg 875-125 mg per " tablet        CONTINUE taking these medications    amLODIPine 10 MG tablet  Commonly known as:  NORVASC  Take 1 tablet (10 mg total) by mouth once daily.     ascorbic acid (vitamin C) 500 MG tablet  Commonly known as:  VITAMIN C  Take 1 tablet (500 mg total) by mouth 2 (two) times daily.     b complex vitamins tablet  Take 1 tablet by mouth once daily.     calcium acetate 667 mg capsule  Commonly known as:  PHOSLO  Take 1 capsule (667 mg total) by mouth 3 (three) times daily.     diltiaZEM 360 MG 24 hr capsule  Commonly known as:  CARDIZEM CD  Take 1 capsule (360 mg total) by mouth once daily.     epoetin vikki 10,000 unit/mL injection  Commonly known as:  PROCRIT  Inject 1 mL (10,000 Units total) into the skin every Mon, Wed, Fri. To be given with HD     furosemide 80 MG tablet  Commonly known as:  LASIX  TAKE ONE TABLET BY MOUTH EVERY DAY     hydrOXYzine HCl 25 MG tablet  Commonly known as:  ATARAX  TAKE 1 TABLET BY MOUTH DAILY AS NEEDED FOR ITCHING.     ipratropium-albuterol  mcg/actuation inhaler  Commonly known as:  COMBIVENT  Inhale 1 puff into the lungs every 4 (four) hours as needed for Wheezing or Shortness of Breath. Rescue     labetalol 100 MG tablet  Commonly known as:  NORMODYNE  TAKE ONE TABLET BY MOUTH THREE TIMES DAILY AS NEEDED IF HEART RATE GREATER THAN 120     lisinopril 40 MG tablet  Commonly known as:  PRINIVIL,ZESTRIL  TAKE ONE TABLET BY MOUTH EVERY DAY     multivitamin with minerals tablet  Take 1 tablet by mouth once daily.     omega-3 fatty acids-vitamin E 1,000 mg Cap  Commonly known as:  FISH OIL  Take 1 capsule by mouth once daily.     pantoprazole 40 MG tablet  Commonly known as:  PROTONIX  TAKE ONE TABLET BY MOUTH EVERY DAY     ETHAN-RACQUEL RX 1- mg-mg-mcg Tab  Generic drug:  vit b cmplx 3-fa-vit c-biotin 1- mg-mg-mcg  TAKE ONE TABLET BY MOUTH EVERY DAY     tenofovir 300 mg Tab  Commonly known as:  VIREAD  Take 1 tablet (300 mg total) by mouth once a week. AFTER DIALYSIS      warfarin 7.5 MG tablet  Commonly known as:  COUMADIN  Take 1 tablet (7.5 mg total) by mouth Daily.     zolpidem 5 MG Tab  Commonly known as:  AMBIEN  TAKE ONE TABLET BY MOUTH EACH EVENING AT BEDTIME AS NEEDED FOR INSOMNIA        STOP taking these medications    amoxicillin-clavulanate 875-125mg 875-125 mg per tablet  Commonly known as:  AUGMENTIN  Replaced by:  amoxicillin-clavulanate 500-125mg 500-125 mg Tab            Indwelling Lines/Drains at time of discharge:   Lines/Drains/Airways     Drain                 Hemodialysis AV Graft 03/16/17 0859 Left upper arm 776 days                Time spent on the discharge of patient:  > 30 minutes  Patient was seen and examined on the date of discharge and determined to be suitable for discharge.         Ana Serrano NP  Department of Hospital Medicine  [facility]

## 2019-05-01 NOTE — PLAN OF CARE
Problem: Adult Inpatient Plan of Care  Goal: Plan of Care Review  Outcome: Ongoing (interventions implemented as appropriate)  Pt tolerates txs well; he was encouraged to work with the aerobika and incentive spirometer.

## 2019-05-01 NOTE — PLAN OF CARE
Sw met with patient at bedside to complete assessment. No family present at time of visit. Pt reports he is independent with ADLs and denies any post hospital needs or services at this time. Pt is current with [facility]. Sw asked about pt not having Medicare and he is a dialysis pt. He reports he should have Medicare by June. Transitional Care Folder, Discharge Planning Begins on Admission pamphlet, [facility], Advance Directive information given to patient along with the contact information given. Sw placed contact information on white board. Sw instructed patient or family to call with any questions or concerns.    D/C Plan: Home  D/C Trans: Family    Pt's pcp is Jacquelyn Skelton MD. Pt uses   [facility] [place] [address]  [address]  [place] [address]  Phone: [phone] Fax: [phone]       05/01/19 0853   Discharge Assessment   Assessment Type Discharge Planning Assessment   Confirmed/corrected address and phone number on facesheet? Yes   Assessment information obtained from? Patient   Communicated expected length of stay with patient/caregiver yes   Prior to hospitilization cognitive status: Alert/Oriented   Prior to hospitalization functional status: Independent   Current cognitive status: Alert/Oriented   Current Functional Status: Independent   Facility Arrived From: Home   Lives With sibling(s)   Able to Return to Prior Arrangements yes   Is patient able to care for self after discharge? Yes   Who are your caregiver(s) and their phone number(s)? Maggie Hernandez, sister, [phone]   Patient's perception of discharge disposition home or selfcare   Readmission Within the Last 30 Days no previous admission in last 30 days   Patient currently being followed by outpatient case management? No   Patient currently receives any other outside agency services? Yes   Name and contact number of agency or person providing  outside services [facility]   Is it the patient/care giver preference to resume care with the current outside agency? Yes   Equipment Currently Used at Home none   Do you have any problems affording any of your prescribed medications? TBD   Is the patient taking medications as prescribed? yes   Does the patient have transportation home? Yes   Transportation Anticipated family or friend will provide   Does the patient receive services at the [facility]madin Clinic? No   Discharge Plan A Home with family   Discharge Plan B Home with family   DME Needed Upon Discharge  none   Patient/Family in Agreement with Plan yes   Does the patient have transportation to healthcare appointments? Yes

## 2019-05-01 NOTE — ASSESSMENT & PLAN NOTE
4/29- will do chest Cts can with contrast , agree with TB rule with AFB, send Gold quantiferon, add empiric Voriconazole , continue Vanco/zosyn     4/30- CT chest does not show any evidence of cavitation -will stop isolation, AFB is negative x2.  Will switch to Augumentin in AM

## 2019-05-01 NOTE — PROGRESS NOTES
Clinical Pharmacy: Vancomycin Signoff Note    Vancomycin consult discontinued by provider.  Pharmacy will sign off, please re-consult as needed.    Thank you for allowing us to participate in this patient's care.   Katherine McArdle, Pharm.D. 5/1/2019 12:16 PM

## 2019-05-01 NOTE — PLAN OF CARE
Problem: Adult Inpatient Plan of Care  Goal: Plan of Care Review  Outcome: Ongoing (interventions implemented as appropriate)  Pt free from fall and injury on shift.  Pt afebrile.  HD performed.

## 2019-05-01 NOTE — PROGRESS NOTES
[facility]  Infectious Disease  Progress Note    Patient Name: Isidro White Jr.  MRN: [id number]  Admission Date: 4/28/2019  Length of Stay: 2 days  Attending Physician: Arelis Dickerson MD  Primary Care Provider: Jacquelyn Skelton MD    Isolation Status: No active isolations  Assessment/Plan:      * Pneumonia  4/29- will do chest Cts can with contrast , agree with TB rule with AFB, send Gold quantiferon, add empiric Voriconazole , continue Vanco/zosyn     4/30- CT chest does not show any evidence of cavitation -will stop isolation, AFB is negative x2.  Will switch to Augumentin in AM     Atrial fibrillation  4/29- will continue diltiazem, monitor closely.    ESRD (end stage renal disease) on dialysis  ESRD as per HD    S/P AVR (aortic valve replacement)  4/29- INR is 1.1, on heparin drip     4/30- will continue close monitoring of INR ,         Anticipated Disposition:     Thank you for your consult. I will follow-up with patient. Please contact us if you have any additional questions.    Christopher Dumont MD  Infectious Disease  [facility]    Subjective:     Principal Problem:Pneumonia    HPI: 49 year old man with history of  AS s/p AVR, ESRD, hepatitis B, and Afib presents with SOB for past 2-3 weeks.  He was in nursing home many years ago and at this time reports night sweats, cough and occasional hemoptysis .   In ER, CT chest without contrast  show  cluster of  tiny subsegmental nodules inferior right upper lobe and multiple clusters within the right middle lobe and right lower lobes, largest single nodule measures 13.5 mm in the inferior right lower lobe, multiple subcentimeter nodules left lower lobe,  larger nodules or clusters of nodules demonstrate surrounding groundglass halos consistent with a small amount of adjacent hemorrhage.     He had negative Gold quantiferon -01/2019. Since admission, he is now persistently febrile-T max 101.7    Interval History: 49 year old man pneumonia  .    Ct chest with contrast done 04/29- Peripheral airspace opacities remain present consistent with multifocal bronchopneumonia.  An embolic etiology may be considered.  No signs of cavitation.  Review of Systems   Constitutional: Negative.  Negative for activity change, appetite change, chills, diaphoresis, fatigue and fever.   HENT: Negative.  Negative for congestion and trouble swallowing.    Eyes: Negative.    Respiratory: Positive for cough and shortness of breath. Negative for chest tightness and wheezing.         Hemoptysis   Cardiovascular: Positive for leg swelling. Negative for chest pain and palpitations.   Gastrointestinal: Negative.  Negative for abdominal distention, abdominal pain, nausea and vomiting.   Genitourinary: Negative.  Negative for decreased urine volume, difficulty urinating, dysuria, enuresis, flank pain, frequency, hematuria, penile swelling, scrotal swelling and urgency.   Musculoskeletal: Negative.  Negative for arthralgias, back pain, joint swelling and neck pain.   Skin: Negative for rash.   Neurological: Negative.  Negative for tremors, seizures and headaches.   Psychiatric/Behavioral: Negative.  Negative for confusion and sleep disturbance. The patient is not nervous/anxious.      Objective:     Vital Signs (Most Recent):  Temp: 99.5 °F (37.5 °C) (05/01/19 0336)  Pulse: 94 (05/01/19 0801)  Resp: 20 (05/01/19 0801)  BP: 132/68 (05/01/19 0708)  SpO2: 95 % (05/01/19 0801) Vital Signs (24h Range):  Temp:  [98.5 °F (36.9 °C)-99.9 °F (37.7 °C)] 99.5 °F (37.5 °C)  Pulse:  [76-96] 94  Resp:  [16-20] 20  SpO2:  [86 %-95 %] 95 %  BP: (113-144)/(57-78) 132/68     Weight: 87.1 kg (192 lb 0.3 oz)  Body mass index is 24.65 kg/m².    Estimated Creatinine Clearance: 6.6 mL/min (A) (based on SCr of 16 mg/dL (H)).    Physical Exam   Constitutional: He is oriented to person, place, and time. He appears well-developed and well-nourished. No distress.   HENT:   Head: Normocephalic and atraumatic.    Mouth/Throat: Oropharynx is clear and moist.   Eyes: Pupils are equal, round, and reactive to light. Conjunctivae and EOM are normal. No scleral icterus.   Neck: Normal range of motion. Neck supple. No JVD present. No thyromegaly present.   Cardiovascular: Regular rhythm. Exam reveals no gallop and no friction rub.   Murmur heard.  Pulmonary/Chest: Effort normal. No respiratory distress. He has wheezes. He has rhonchi (mild). He has no rales.   Abdominal: Soft. Bowel sounds are normal. He exhibits no distension. There is no tenderness. There is no guarding.   Musculoskeletal: Normal range of motion.   Left upper arm fistula has a positive bruit and thrill   Neurological: He is oriented to person, place, and time. No cranial nerve deficit.   Skin: Skin is warm. Capillary refill takes less than 2 seconds. He is not diaphoretic. No erythema.   Nursing note and vitals reviewed.      Significant Labs:   Blood Culture:   Recent Labs   Lab 03/27/19  1200 03/27/19  1215 04/28/19  2315 04/28/19  2322   LABBLOO No growth after 5 days. No growth after 5 days. No Growth to date  No Growth to date No Growth to date  No Growth to date     CBC:   Recent Labs   Lab 04/30/19  0506 05/01/19  0343   WBC 5.61 6.19   HGB 8.5* 8.0*   HCT 25.5* 23.8*   PLT 94* 90*     Respiratory Culture:   Recent Labs   Lab 03/28/19  0744 04/29/19  0416   GSRESP >10epis/lfp and <than many WBC's  Predominance of oropharyngeal gaye. Please recollect. <10 epithelial cells per low power field.  Rare WBC's  Few Gram positive cocci  Rare Gram negative rods   RESPIRATORYC Specimen inadequate - culture not performed Normal respiratory gaye     Urine Culture: No results for input(s): LABURIN in the last 4320 hours.    Significant Imaging: I have reviewed all pertinent imaging results/findings within the past 24 hours.

## 2019-05-01 NOTE — PROGRESS NOTES
Pharmacy Coumadin Consult Note    INR=1.3 (trending up appropriately)  Goal INR=2-3    Hx of Afib, Aortic vavle replacement  Home dose: Warfarin 7.5mg daily    Continue home dose  Pharmacy is consulted to dose  Patient has been educated.    Thank you for allowing us to participate in this patient's care.  Lina Pitt, Pharm D 5/1/2019 9:34 AM

## 2019-05-01 NOTE — PROCEDURES
"Isidro White Jr. is a 49 y.o. male patient.    Temp: 99.1 °F (37.3 °C) (05/01/19 0944)  Pulse: 78 (05/01/19 1020)  Resp: 18 (05/01/19 0944)  BP: 119/62 (05/01/19 1020)  SpO2: 95 % (05/01/19 0804)  Weight: 87.1 kg (192 lb 0.3 oz) (04/30/19 5679)  Height: 6' 2" (188 cm) (04/29/19 1307)       Hemodialysis inpatient If "per protocol" is selected for one or more ingredients (K+, Ca++, Na+, Bicarb) for the dialysate bath solution, select the hyperlink for the protocol instructions.  Date/Time: 5/1/2019 10:55 AM  Performed by: Jarett Ivey MD  Authorized by: Jarett Ivey MD       Patient Seen on HD ; HD is for ESRD ; HD orders written and reviewed with HD nurse and nursing staff ;   Access is functioning well ; UF Goal is 2.5 litres as tolerated ; Will Give Epogen for anemia ; F-180 dialyzer ;  DFR is 500 ; patient is tolerating HD well ; next HD will be scheduled based on daily rounding and patient's clinical situation     Parameters for Hypotension outlined in orders and communications with nurses       Jarett Ivey  5/1/2019    "

## 2019-05-01 NOTE — PROGRESS NOTES
Home Oxygen Evaluation    Date Performed: 5/1/2019    1) Patient's Home O2 Sat on room air, while at rest 92%        If O2 sats on room air at rest are 88% or below, patient qualifies. No additional testing needed. Document N/A in steps 2 and 3. If 89% or above, complete steps 2.      2) Patient's O2 Sat on room air while exercising 88%        If O2 sats on room air while exercising remain 89% or above patient does not qualify, no further testing needed Document N/A in step 3. If O2 sats on room air while exercising are 88% or below, continue to step 3.      3) Patient's O2 Sat back on nasal cannula at 3ltiers 95%         (Must show improvement from #2 for patients to qualify)    If O2 sats improve on oxygen, patient qualifies for portable oxygen. If not, the patient does not qualify.      Pt is placed back on nasal cannula at 3ltiers after ambulation.

## 2019-05-01 NOTE — PLAN OF CARE
Problem: Adult Inpatient Plan of Care  Goal: Plan of Care Review  Outcome: Ongoing (interventions implemented as appropriate)  Observed pt lying in bed, uneventful shift, pt remained free of falls, able to verbalize needs pt progressing toward goal

## 2019-05-01 NOTE — PROGRESS NOTES
"Went over discharge instructions with patient.   Stressed importance of making and keeping all follow ups.   Patient verbalized understanding and has no questions in regards to discharge.  IV removed, catheter intact.  Telemetry box removed.  Patient wheeled down by staff to waiting vehicle. No s/s of distress noted.  /63 (BP Location: Right arm, Patient Position: Lying)   Pulse 102   Temp 98 °F (36.7 °C) (Oral)   Resp 18   Ht 6' 2" (1.88 m)   Wt 87.2 kg (192 lb 5.6 oz)   SpO2 (!) 93%   BMI 24.70 kg/m²       "

## 2019-05-02 LAB
HISTOPLASMA AG INTERP.: NEGATIVE
HISTOPLASMA RESULT: NORMAL NG/ML

## 2019-05-02 NOTE — PHYSICIAN QUERY
PT Name: Isidro White Jr.  MR #: [id number]     PHYSICIAN QUERY -  ACUITY OF CONDITION CLARIFICATION      CDS/: Gely Choudhary               Contact information:[email or web address]  This form is a permanent document in the medical record.     Query Date: May 2, 2019    By submitting this query, we are merely seeking further clarification of documentation to reflect the severity of illness of your patient. Please utilize your independent clinical judgment when addressing the question(s) below.    The Medical record reflects the following:     Indicators   Supporting Clinical Findings Location in Medical Record   x Documentation of condition 48 y/o male with ESRD, on chronic HD at [facility] MWF, HTN, hepatitis B (currently on active treatment), diastolic CHF, h/o of AVR 2017   Nephro CN 4/29    Lab Value(s)     x Radiology Findings 1. No evidence of pulmonary abscess.  2. Peripheral airspace opacities remain present consistent with multifocal bronchopneumonia.  An embolic etiology may be considered.  No signs of cavitation.  3. Persistent enlargement of the heart.  4. Fatty infiltration of the liver with splenomegaly.   CT Chest 4/29   x Treatment                                 Medication Furosemide 80mg PO daily   MAR 4/29-5/1   x Other 1 - Mild left atrial enlargement.  2 - Concentric hypertrophy.  3 - No wall motion abnormalities.  4 - Normal left ventricular systolic function (EF 60-65%).  5 - Impaired LV relaxation, elevated LAP (grade 2 diastolic dysfunction).  6 - Normal right ventricular systolic function .  7 - Pulmonary hypertension.The estimated PA systolic pressure is greater than 57 mmHg.  8 - Aortic valve prosthesis, JHOANA = 2.01 cm2, AVAi = 0.94 cm2/m2, peak velocity = 3.89 m/s, mean gradient = 31 mmHg.  9 - Mild tricuspid regurgitation.      Nephrology saw the patient and felt that symptoms were secondary to volume overload. Patient was dialyzed today and had significant  improvement in shortness of breath   Echo report 5/1                                                    DS 5/1     Provider, please specify the acuity/chronicity of diastolic CHF:    [   ] Chronic   [ x  ] Acute and/on chronic   [   ]  Clinically Undetermined       Please document in your progress notes daily for the duration of treatment until resolved, and include in your discharge summary.

## 2019-05-02 NOTE — PROGRESS NOTES
[facility]  Infectious Disease  Progress Note    Patient Name: Isidro White Jr.  MRN: [id number]  Admission Date: 4/28/2019  Length of Stay: 2 days  Attending Physician: No att. providers found  Primary Care Provider: Jacquelyn Skelton MD  Late entry note  Isolation Status: No active isolations  Assessment/Plan:      * Pneumonia  4/29- will do chest Cts can with contrast , agree with TB rule with AFB, send Gold quantiferon, add empiric Voriconazole , continue Vanco/zosyn     4/30- CT chest does not show any evidence of cavitation -will stop isolation, AFB is negative x2.  Will switch to Augumentin in AM     5/1- discussed with primary team -will complete therapy with PO Augumentin for 2 weeks and will follo wup on discharge.  Cardiac echo did not show endocarditis     S/P AVR (aortic valve replacement)  4/29- INR is 1.1, on heparin drip     4/30- will continue close monitoring of INR ,         Anticipated Disposition:     Thank you for your consult. I will follow-up with patient. Please contact us if you have any additional questions.    Christopher Dumont MD  Infectious Disease  [facility]    Subjective:     Principal Problem:Pneumonia    HPI: 49 year old man with history of  AS s/p AVR, ESRD, hepatitis B, and Afib presents with SOB for past 2-3 weeks.  He was in nursing home many years ago and at this time reports night sweats, cough and occasional hemoptysis .   In ER, CT chest without contrast  show  cluster of  tiny subsegmental nodules inferior right upper lobe and multiple clusters within the right middle lobe and right lower lobes, largest single nodule measures 13.5 mm in the inferior right lower lobe, multiple subcentimeter nodules left lower lobe,  larger nodules or clusters of nodules demonstrate surrounding groundglass halos consistent with a small amount of adjacent hemorrhage.     He had negative Gold quantiferon -01/2019. Since admission, he is now persistently febrile-T max  101.7    Interval History: 49 year old man pneumonia .    Ct chest with contrast done 04/29- Peripheral airspace opacities remain present consistent with multifocal bronchopneumonia.  An embolic etiology may be considered.  No signs of cavitation.  5/1- he was seen in HD unit and feels better  Review of Systems   Constitutional: Negative.  Negative for activity change, appetite change, chills, diaphoresis, fatigue and fever.   HENT: Negative.  Negative for congestion and trouble swallowing.    Eyes: Negative.    Respiratory: Positive for cough and shortness of breath. Negative for chest tightness and wheezing.         -   Cardiovascular: Positive for leg swelling. Negative for chest pain and palpitations.   Gastrointestinal: Negative.  Negative for abdominal distention, abdominal pain, nausea and vomiting.   Genitourinary: Negative.  Negative for decreased urine volume, difficulty urinating, dysuria, enuresis, flank pain, frequency, hematuria, penile swelling, scrotal swelling and urgency.   Musculoskeletal: Negative.  Negative for arthralgias, back pain, joint swelling and neck pain.   Skin: Negative for rash.   Neurological: Negative.  Negative for tremors, seizures and headaches.   Psychiatric/Behavioral: Negative.  Negative for confusion and sleep disturbance. The patient is not nervous/anxious.      Objective:     Vital Signs (Most Recent):  Temp: 98 °F (36.7 °C) (05/01/19 1620)  Pulse: 102 (05/01/19 1621)  Resp: 18 (05/01/19 1620)  BP: 129/63 (05/01/19 1620)  SpO2: (!) 93 % (05/01/19 1621) Vital Signs (24h Range):  Temp:  [98 °F (36.7 °C)-99.1 °F (37.3 °C)] 98 °F (36.7 °C)  Pulse:  [] 102  Resp:  [18-20] 18  SpO2:  [83 %-95 %] 93 %  BP: ()/(37-82) 129/63     Weight: 87.2 kg (192 lb 5.6 oz)  Body mass index is 24.7 kg/m².    Estimated Creatinine Clearance: 6.6 mL/min (A) (based on SCr of 16 mg/dL (H)).    Physical Exam   Constitutional: He is oriented to person, place, and time. He appears  well-developed and well-nourished. No distress.   HENT:   Head: Normocephalic and atraumatic.   Mouth/Throat: Oropharynx is clear and moist.   Eyes: Pupils are equal, round, and reactive to light. Conjunctivae and EOM are normal. No scleral icterus.   Neck: Normal range of motion. Neck supple. No JVD present. No thyromegaly present.   Cardiovascular: Regular rhythm. Exam reveals no gallop and no friction rub.   Murmur heard.  Pulmonary/Chest: Effort normal. No respiratory distress. He has wheezes. He has rhonchi (mild). He has no rales.   Abdominal: Soft. Bowel sounds are normal. He exhibits no distension. There is no tenderness. There is no guarding.   Musculoskeletal: Normal range of motion.   Left upper arm fistula has a positive bruit and thrill   Neurological: He is oriented to person, place, and time. No cranial nerve deficit.   Skin: Skin is warm. Capillary refill takes less than 2 seconds. He is not diaphoretic. No erythema.   Nursing note and vitals reviewed.      Significant Labs:   Blood Culture:   Recent Labs   Lab 03/27/19  1200 03/27/19  1215 04/28/19  2315 04/28/19  2322   LABBLOO No growth after 5 days. No growth after 5 days. No Growth to date  No Growth to date  No Growth to date No Growth to date  No Growth to date  No Growth to date     CBC:   Recent Labs   Lab 05/01/19  0343   WBC 6.19   HGB 8.0*   HCT 23.8*   PLT 90*     Respiratory Culture:   Recent Labs   Lab 03/28/19  0744 04/29/19  0416   GSRESP >10epis/lfp and <than many WBC's  Predominance of oropharyngeal gaye. Please recollect. <10 epithelial cells per low power field.  Rare WBC's  Few Gram positive cocci  Rare Gram negative rods   RESPIRATORYC Specimen inadequate - culture not performed Normal respiratory gaye  No S aureus or Pseudomonas isolated.     Urine Culture: No results for input(s): LABURIN in the last 4320 hours.    Significant Imaging: I have reviewed all pertinent imaging results/findings within the past 24  hours.

## 2019-05-02 NOTE — ASSESSMENT & PLAN NOTE
4/29- will do chest Cts can with contrast , agree with TB rule with AFB, send Gold quantiferon, add empiric Voriconazole , continue Vanco/zosyn     4/30- CT chest does not show any evidence of cavitation -will stop isolation, AFB is negative x2.  Will switch to Augumentin in AM     5/1- discussed with primary team -will complete therapy with PO Augumentin for 2 weeks and will follo wup on discharge.  Cardiac echo did not show endocarditis

## 2019-05-02 NOTE — PLAN OF CARE
05/02/19 1418   Final Note   Assessment Type Final Discharge Note   Anticipated Discharge Disposition Home   Right Care Referral Info   Post Acute Recommendation No Care

## 2019-05-02 NOTE — SUBJECTIVE & OBJECTIVE
Interval History: 49 year old man pneumonia .    Ct chest with contrast done 04/29- Peripheral airspace opacities remain present consistent with multifocal bronchopneumonia.  An embolic etiology may be considered.  No signs of cavitation.  5/1- he was seen in HD unit and feels better  Review of Systems   Constitutional: Negative.  Negative for activity change, appetite change, chills, diaphoresis, fatigue and fever.   HENT: Negative.  Negative for congestion and trouble swallowing.    Eyes: Negative.    Respiratory: Positive for cough and shortness of breath. Negative for chest tightness and wheezing.         -   Cardiovascular: Positive for leg swelling. Negative for chest pain and palpitations.   Gastrointestinal: Negative.  Negative for abdominal distention, abdominal pain, nausea and vomiting.   Genitourinary: Negative.  Negative for decreased urine volume, difficulty urinating, dysuria, enuresis, flank pain, frequency, hematuria, penile swelling, scrotal swelling and urgency.   Musculoskeletal: Negative.  Negative for arthralgias, back pain, joint swelling and neck pain.   Skin: Negative for rash.   Neurological: Negative.  Negative for tremors, seizures and headaches.   Psychiatric/Behavioral: Negative.  Negative for confusion and sleep disturbance. The patient is not nervous/anxious.      Objective:     Vital Signs (Most Recent):  Temp: 98 °F (36.7 °C) (05/01/19 1620)  Pulse: 102 (05/01/19 1621)  Resp: 18 (05/01/19 1620)  BP: 129/63 (05/01/19 1620)  SpO2: (!) 93 % (05/01/19 1621) Vital Signs (24h Range):  Temp:  [98 °F (36.7 °C)-99.1 °F (37.3 °C)] 98 °F (36.7 °C)  Pulse:  [] 102  Resp:  [18-20] 18  SpO2:  [83 %-95 %] 93 %  BP: ()/(37-82) 129/63     Weight: 87.2 kg (192 lb 5.6 oz)  Body mass index is 24.7 kg/m².    Estimated Creatinine Clearance: 6.6 mL/min (A) (based on SCr of 16 mg/dL (H)).    Physical Exam   Constitutional: He is oriented to person, place, and time. He appears well-developed and  well-nourished. No distress.   HENT:   Head: Normocephalic and atraumatic.   Mouth/Throat: Oropharynx is clear and moist.   Eyes: Pupils are equal, round, and reactive to light. Conjunctivae and EOM are normal. No scleral icterus.   Neck: Normal range of motion. Neck supple. No JVD present. No thyromegaly present.   Cardiovascular: Regular rhythm. Exam reveals no gallop and no friction rub.   Murmur heard.  Pulmonary/Chest: Effort normal. No respiratory distress. He has wheezes. He has rhonchi (mild). He has no rales.   Abdominal: Soft. Bowel sounds are normal. He exhibits no distension. There is no tenderness. There is no guarding.   Musculoskeletal: Normal range of motion.   Left upper arm fistula has a positive bruit and thrill   Neurological: He is oriented to person, place, and time. No cranial nerve deficit.   Skin: Skin is warm. Capillary refill takes less than 2 seconds. He is not diaphoretic. No erythema.   Nursing note and vitals reviewed.      Significant Labs:   Blood Culture:   Recent Labs   Lab 03/27/19  1200 03/27/19  1215 04/28/19  2315 04/28/19  2322   LABBLOO No growth after 5 days. No growth after 5 days. No Growth to date  No Growth to date  No Growth to date No Growth to date  No Growth to date  No Growth to date     CBC:   Recent Labs   Lab 05/01/19  0343   WBC 6.19   HGB 8.0*   HCT 23.8*   PLT 90*     Respiratory Culture:   Recent Labs   Lab 03/28/19  0744 04/29/19  0416   GSRESP >10epis/lfp and <than many WBC's  Predominance of oropharyngeal gaye. Please recollect. <10 epithelial cells per low power field.  Rare WBC's  Few Gram positive cocci  Rare Gram negative rods   RESPIRATORYC Specimen inadequate - culture not performed Normal respiratory gaye  No S aureus or Pseudomonas isolated.     Urine Culture: No results for input(s): LABURIN in the last 4320 hours.    Significant Imaging: I have reviewed all pertinent imaging results/findings within the past 24 hours.

## 2019-05-03 ENCOUNTER — PATIENT OUTREACH (OUTPATIENT)
Dept: ADMINISTRATIVE | Facility: CLINIC | Age: 49
End: 2019-05-03

## 2019-05-03 LAB
M TB CMPLX DNA SPEC QL NAA+PROBE: NEGATIVE
SPECIMEN SOURCE: NORMAL

## 2019-05-03 NOTE — PATIENT INSTRUCTIONS
Discharge Instructions for Pneumonia  You have been diagnosed with pneumonia, a serious lung infection. Most cases of pneumonia are caused by bacteria. Pneumonia most often occurs in older adults, young children, and people with chronic health problems.  Home Care  Take your medication exactly as directed. Dont skip doses. Continue taking your antibiotics as directed until they are all gone--even if you start to feel better. This will prevent the pneumonia from coming back.  Drink at least 8 glasses of water daily, unless directed otherwise. This helps to loosen and thin secretions so that you can cough them up.  Use a cool-mist humidifier in your bedroom. Be sure to clean the humidifier daily.  Coughing up mucus is normal. Dont use medications to suppress your cough unless your cough is dry, painful, or interferes with your sleep. You may use an expectorant if ordered by your doctor.  Warm compresses or a moist heating pad on the lowest setting can be used to relieve chest discomfort. Use several times a day for 15-20 minutes at a time. (To prevent injuring your skin, be sure the temperature of the compress or heating pad is warm, not hot.)  Get plenty of rest until your fever, shortness of breath, and chest pain go away.  Plan to get a flu shot every year.  Ask your doctor about a pneumonia vaccination.  Follow-Up  Make a follow-up appointment as directed by our staff.    When to Seek Medical Attention  Call 911 right away if you have any of the following:  Chest pain  Trouble breathing  Blue lips or fingernails  Otherwise, call your doctor if you have any of the following:  Fever above 100.4°F  Yellow, green, bloody, or smelly sputum  More than normal mucus production  Vomiting   © [id number] [employer], [address] [place] [address], [place] [address]. All rights reserved. This information is not intended as a substitute for professional medical care. Always follow your healthcare professional's instructions.

## 2019-05-04 LAB
BACTERIA BLD CULT: NORMAL
BACTERIA BLD CULT: NORMAL
C IMMITIS AB TITR SER CF: NEGATIVE {TITER}
C IMMITIS IGG SER QL: NEGATIVE
C IMMITIS IGM SER QL: NEGATIVE

## 2019-05-07 ENCOUNTER — TELEPHONE (OUTPATIENT)
Dept: ENDOSCOPY | Facility: HOSPITAL | Age: 49
End: 2019-05-07

## 2019-05-07 ENCOUNTER — TELEPHONE (OUTPATIENT)
Dept: TRANSPLANT | Facility: CLINIC | Age: 49
End: 2019-05-07

## 2019-05-16 ENCOUNTER — OFFICE VISIT (OUTPATIENT)
Dept: GASTROENTEROLOGY | Facility: CLINIC | Age: 49
End: 2019-05-16
Payer: MEDICAID

## 2019-05-16 VITALS
DIASTOLIC BLOOD PRESSURE: 62 MMHG | HEART RATE: 80 BPM | HEIGHT: 74 IN | SYSTOLIC BLOOD PRESSURE: 90 MMHG | BODY MASS INDEX: 26 KG/M2 | WEIGHT: 202.63 LBS

## 2019-05-16 DIAGNOSIS — Z12.11 SCREENING FOR MALIGNANT NEOPLASM OF COLON: Primary | ICD-10-CM

## 2019-05-16 DIAGNOSIS — N18.6 ESRD (END STAGE RENAL DISEASE) ON DIALYSIS: ICD-10-CM

## 2019-05-16 DIAGNOSIS — Z99.2 ESRD (END STAGE RENAL DISEASE) ON DIALYSIS: ICD-10-CM

## 2019-05-16 DIAGNOSIS — Z79.01 CURRENT USE OF LONG TERM ANTICOAGULATION: ICD-10-CM

## 2019-05-16 PROCEDURE — 99214 OFFICE O/P EST MOD 30 MIN: CPT | Mod: PBBFAC,NTX | Performed by: NURSE PRACTITIONER

## 2019-05-16 PROCEDURE — 99214 PR OFFICE/OUTPT VISIT, EST, LEVL IV, 30-39 MIN: ICD-10-PCS | Mod: S$GLB,TXP,, | Performed by: NURSE PRACTITIONER

## 2019-05-16 PROCEDURE — 99999 PR PBB SHADOW E&M-EST. PATIENT-LVL IV: CPT | Mod: PBBFAC,TXP,, | Performed by: NURSE PRACTITIONER

## 2019-05-16 PROCEDURE — 99214 OFFICE O/P EST MOD 30 MIN: CPT | Mod: S$GLB,TXP,, | Performed by: NURSE PRACTITIONER

## 2019-05-16 PROCEDURE — 99999 PR PBB SHADOW E&M-EST. PATIENT-LVL IV: ICD-10-PCS | Mod: PBBFAC,TXP,, | Performed by: NURSE PRACTITIONER

## 2019-05-16 RX ORDER — HYDROXYZINE PAMOATE 25 MG/1
25 CAPSULE ORAL DAILY
COMMUNITY
Start: 2019-04-22 | End: 2019-09-30 | Stop reason: SDUPTHER

## 2019-05-16 RX ORDER — AMOXICILLIN 500 MG
2 CAPSULE ORAL DAILY
COMMUNITY

## 2019-05-16 RX ORDER — CHOLECALCIFEROL (VITAMIN D3) 25 MCG
1000 TABLET ORAL
COMMUNITY
Start: 2019-05-09 | End: 2021-04-15

## 2019-05-16 RX ORDER — HYDRALAZINE HYDROCHLORIDE 100 MG/1
100 TABLET, FILM COATED ORAL 3 TIMES DAILY
COMMUNITY
Start: 2019-05-09 | End: 2019-12-19

## 2019-05-16 NOTE — PROGRESS NOTES
Clinic Consult:  [facility] Consultation Note    Reason for Consult:  The primary encounter diagnosis was Screening for malignant neoplasm of colon. Diagnoses of Current use of long term anticoagulation and ESRD (end stage renal disease) on dialysis were also pertinent to this visit.    PCP: Jacquelyn Skelton   No address on file    HPI:  This is a 49 y.o. male here to discuss need for colonoscopy  He states that he has been feeling well without any GI complaints  He is in the process of kidney transplant evaluation.   No fam hx of CRC or IBD  Denies any abdominal pain.  No nausea or vomiting.  No change in bowel pattern.  No melena or hematochezia. No weight loss.  PMH includes CHF, for which he is on long term anticoagulation.       Review of Systems   Constitutional: Negative for chills, fever, malaise/fatigue and weight loss.   Respiratory: Negative for cough.    Cardiovascular: Negative for chest pain.   Gastrointestinal:        Per HPI   Musculoskeletal: Negative for myalgias.   Skin: Negative for itching and rash.   Neurological: Negative for headaches.   Psychiatric/Behavioral: The patient is not nervous/anxious.        Medical History:   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     HD M, W, F    ESRD on dialysis     Hepatitis B     Hyperlipidemia     Hypertension     Secondary hyperparathyroidism of renal origin     Supraventricular tachycardia     atrial tachycardia    Tachycardia     Valvular regurgitation     AI, TR       Surgical History:  Past Surgical History:   Procedure Laterality Date    ABLATION N/A 3/13/2017    Performed by Nigel [facility]allpa [facility] at [facility] CATH LAB    ASD REPAIR      age 8 [facility]    AV Graft Creation Left 03/2017    CARDIAC CATHETERIZATION      [facility]     CARDIAC VALVE SURGERY  02/08/2017    22 mm Medtronic AV, 28 mm TV Medtronic annuloplaty ring    [id number] Accuseal Left 3/16/2017    Performed by Alcides Manning MD at [facility] OR 2ND FLR    RADIOFREQUENCY ABLATION  03/13/2017    Atrial tachycardia    REDO STERNOTOMY WITH AORTIC VALVE REPLACEMENT/redo sternotomy N/A 2/8/2017    Performed by Jose Gastelum MD at [facility] OR 2ND FLR    REPAIR-VALVE-TRICUSPID  2/8/2017    Performed by Jose [facility]elum MD at [facility] OR 2ND FLR    REPLACEMENT-VALVE-AORTIC N/A 2/8/2017    Performed by Jose Gastelum MD at [facility] OR 2ND FLR       Family History:   Family History   Problem Relation Age of Onset    Leukemia Mother     Heart attack Father         massive MI at age 67    No Known Problems Sister     No Known Problems Brother     No Known Problems Sister     No Known Problems Sister     Heart failure Paternal Grandmother     Diabetes Paternal Aunt     Hypertension Paternal Aunt     Leukemia Paternal Aunt         CLL    Suicide Paternal Uncle     Anesthesia problems Paternal Uncle     Diabetes Paternal Aunt     Stroke Paternal Aunt     Valvular heart disease Maternal Aunt     Kidney disease Neg Hx     Colon cancer Neg Hx        Social History:   Social History     Tobacco Use    Smoking status: Never Smoker    Smokeless tobacco: Never Used   Substance Use Topics    Alcohol use: No     Frequency: Never     Comment: quit in 2016; does have heavy drinking history; started drinking at age 13; was drinking a 12 pack over the weekend and two 24 ounces of beer a day during the week along with a pint of hard alcohol    Drug use: No       Allergies: Reviewed    Home Medications:   Current Outpatient Medications on File Prior to Visit   Medication Sig Dispense Refill    amLODIPine (NORVASC) 10 MG tablet Take 1 tablet (10 mg total) by mouth once daily. 30 tablet 8    ascorbic acid, vitamin C, (VITAMIN C) 500 MG tablet  Take 1 tablet (500 mg total) by mouth 2 (two) times daily.      b complex vitamins tablet Take 1 tablet by mouth once daily.      calcium acetate (PHOSLO) 667 mg capsule Take 1 capsule (667 mg total) by mouth 3 (three) times daily. 90 capsule 11    diltiaZEM (CARDIZEM CD) 360 MG 24 hr capsule Take 1 capsule (360 mg total) by mouth once daily. 90 capsule 1    epoetin vikki (PROCRIT) 10,000 unit/mL injection Inject 1 mL (10,000 Units total) into the skin every Mon, Wed, Fri. To be given with HD      fish oil-omega-3 fatty acids 300-1,000 mg capsule Take 2 g by mouth once daily.      furosemide (LASIX) 80 MG tablet TAKE ONE TABLET BY MOUTH EVERY DAY 30 tablet 9    hydrALAZINE (APRESOLINE) 100 MG tablet Take 100 mg by mouth 3 (three) times daily.      hydrOXYzine HCl (ATARAX) 25 MG tablet TAKE 1 TABLET BY MOUTH DAILY AS NEEDED FOR ITCHING. 30 tablet 8    hydrOXYzine pamoate (VISTARIL) 25 MG Cap Take 25 mg by mouth once daily.      labetalol (NORMODYNE) 100 MG tablet TAKE ONE TABLET BY MOUTH THREE TIMES DAILY AS NEEDED IF HEART RATE GREATER THAN 120 90 tablet 3    lisinopril (PRINIVIL,ZESTRIL) 40 MG tablet TAKE ONE TABLET BY MOUTH EVERY DAY 30 tablet 9    multivitamin with minerals tablet Take 1 tablet by mouth once daily.      pantoprazole (PROTONIX) 40 MG tablet TAKE ONE TABLET BY MOUTH EVERY DAY 30 tablet 9    ETHAN-RACQUEL RX 1- mg-mg-mcg Tab TAKE ONE TABLET BY MOUTH EVERY DAY 90 tablet 2    tenofovir (VIREAD) 300 mg Tab Take 1 tablet (300 mg total) by mouth once a week. AFTER DIALYSIS 4 tablet 12    vitamin D (VITAMIN D3) 1000 units Tab Take 1,000 Units by mouth 3 (three) times a week.      omega-3 fatty acids-vitamin E (FISH OIL) 1,000 mg Cap Take 1 capsule by mouth once daily.  0    warfarin (COUMADIN) 7.5 MG tablet Take 1 tablet (7.5 mg total) by mouth Daily. 30 tablet 11    zolpidem (AMBIEN) 5 MG Tab TAKE ONE TABLET BY MOUTH EACH EVENING AT BEDTIME AS NEEDED FOR INSOMNIA 30 tablet 4     "[DISCONTINUED] amoxicillin-clavulanate 500-125mg (AUGMENTIN) 500-125 mg Tab Take 1 tablet (500 mg total) by mouth once daily. for 14 days 14 tablet 0    [DISCONTINUED] ipratropium-albuterol (COMBIVENT)  mcg/actuation inhaler Inhale 1 puff into the lungs every 4 (four) hours as needed for Wheezing or Shortness of Breath. Rescue 4 g 0     No current facility-administered medications on file prior to visit.        Physical Exam:  Vital Signs:  BP 90/62   Pulse 80   Ht 6' 2" (1.88 m)   Wt 91.9 kg (202 lb 9.6 oz)   BMI 26.01 kg/m²   Body mass index is 26.01 kg/m².  Physical Exam   Constitutional: He is oriented to person, place, and time. He appears well-developed and well-nourished.   HENT:   Head: Normocephalic.   Eyes: No scleral icterus.   Neck: Normal range of motion.   Cardiovascular: Normal rate and regular rhythm.   Pulmonary/Chest: Effort normal and breath sounds normal.   Abdominal: Soft. Bowel sounds are normal. He exhibits no distension. There is no tenderness.   Musculoskeletal: Normal range of motion.   Neurological: He is alert and oriented to person, place, and time.   Skin: Skin is warm and dry.   Psychiatric: He has a normal mood and affect.   Vitals reviewed.      Labs: Pertinent labs reviewed.      Assessment:  1. Screening for malignant neoplasm of colon    2. Current use of long term anticoagulation    3. ESRD (end stage renal disease) on dialysis         Recommendations:  - will plan for colonoscopy for screening with a miralax prep  - Will get OK from cardiologist to hold Coumadin prior to procedure.     Follow up to be determined by results of procedure.        Thank you so much for allowing me to participate in the care of Isidro DHIRAJ Adames Jr.  "

## 2019-05-21 ENCOUNTER — LAB VISIT (OUTPATIENT)
Dept: LAB | Facility: HOSPITAL | Age: 49
End: 2019-05-21
Payer: COMMERCIAL

## 2019-05-21 ENCOUNTER — OFFICE VISIT (OUTPATIENT)
Dept: HEPATOLOGY | Facility: CLINIC | Age: 49
End: 2019-05-21
Payer: COMMERCIAL

## 2019-05-21 ENCOUNTER — TELEPHONE (OUTPATIENT)
Dept: HEPATOLOGY | Facility: CLINIC | Age: 49
End: 2019-05-21

## 2019-05-21 VITALS
DIASTOLIC BLOOD PRESSURE: 57 MMHG | SYSTOLIC BLOOD PRESSURE: 102 MMHG | OXYGEN SATURATION: 97 % | HEIGHT: 74 IN | BODY MASS INDEX: 26.66 KG/M2 | HEART RATE: 74 BPM | WEIGHT: 207.69 LBS

## 2019-05-21 DIAGNOSIS — B18.1 CHRONIC VIRAL HEPATITIS B WITHOUT DELTA AGENT AND WITHOUT COMA: ICD-10-CM

## 2019-05-21 DIAGNOSIS — B18.1 CHRONIC VIRAL HEPATITIS B WITHOUT DELTA AGENT AND WITHOUT COMA: Primary | ICD-10-CM

## 2019-05-21 DIAGNOSIS — K74.00 HEPATIC FIBROSIS: ICD-10-CM

## 2019-05-21 DIAGNOSIS — Z01.818 PRE-TRANSPLANT EVALUATION FOR KIDNEY TRANSPLANT: ICD-10-CM

## 2019-05-21 DIAGNOSIS — Z99.2 ESRD ON DIALYSIS: ICD-10-CM

## 2019-05-21 DIAGNOSIS — N18.6 ESRD ON DIALYSIS: ICD-10-CM

## 2019-05-21 LAB
AFP SERPL-MCNC: 5.7 NG/ML (ref 0–8.4)
ALBUMIN SERPL BCP-MCNC: 2.8 G/DL (ref 3.5–5.2)
ALP SERPL-CCNC: 79 U/L (ref 55–135)
ALT SERPL W/O P-5'-P-CCNC: 24 U/L (ref 10–44)
ANION GAP SERPL CALC-SCNC: 14 MMOL/L (ref 8–16)
AST SERPL-CCNC: 22 U/L (ref 10–40)
BILIRUB SERPL-MCNC: 0.4 MG/DL (ref 0.1–1)
BUN SERPL-MCNC: 55 MG/DL (ref 6–20)
CALCIUM SERPL-MCNC: 10 MG/DL (ref 8.7–10.5)
CHLORIDE SERPL-SCNC: 98 MMOL/L (ref 95–110)
CO2 SERPL-SCNC: 27 MMOL/L (ref 23–29)
CREAT SERPL-MCNC: 13.1 MG/DL (ref 0.5–1.4)
EST. GFR  (AFRICAN AMERICAN): 4.6 ML/MIN/1.73 M^2
EST. GFR  (NON AFRICAN AMERICAN): 4 ML/MIN/1.73 M^2
GLUCOSE SERPL-MCNC: 87 MG/DL (ref 70–110)
HEPATITIS A ANTIBODY, IGG: POSITIVE
INR PPP: 1.1 (ref 0.8–1.2)
POTASSIUM SERPL-SCNC: 4.1 MMOL/L (ref 3.5–5.1)
PROT SERPL-MCNC: 8.2 G/DL (ref 6–8.4)
PROTHROMBIN TIME: 11.1 SEC (ref 9–12.5)
SODIUM SERPL-SCNC: 139 MMOL/L (ref 136–145)

## 2019-05-21 PROCEDURE — 87517 HEPATITIS B DNA QUANT: CPT | Mod: TXP

## 2019-05-21 PROCEDURE — 99214 OFFICE O/P EST MOD 30 MIN: CPT | Mod: S$GLB,TXP,, | Performed by: NURSE PRACTITIONER

## 2019-05-21 PROCEDURE — 99999 PR PBB SHADOW E&M-EST. PATIENT-LVL V: ICD-10-PCS | Mod: PBBFAC,TXP,, | Performed by: NURSE PRACTITIONER

## 2019-05-21 PROCEDURE — 82105 ALPHA-FETOPROTEIN SERUM: CPT | Mod: TXP

## 2019-05-21 PROCEDURE — 99215 OFFICE O/P EST HI 40 MIN: CPT | Mod: PBBFAC,TXP | Performed by: NURSE PRACTITIONER

## 2019-05-21 PROCEDURE — 80053 COMPREHEN METABOLIC PANEL: CPT | Mod: TXP

## 2019-05-21 PROCEDURE — 86790 VIRUS ANTIBODY NOS: CPT | Mod: TXP

## 2019-05-21 PROCEDURE — 85610 PROTHROMBIN TIME: CPT | Mod: TXP

## 2019-05-21 PROCEDURE — 99214 PR OFFICE/OUTPT VISIT, EST, LEVL IV, 30-39 MIN: ICD-10-PCS | Mod: S$GLB,TXP,, | Performed by: NURSE PRACTITIONER

## 2019-05-21 PROCEDURE — 99999 PR PBB SHADOW E&M-EST. PATIENT-LVL V: CPT | Mod: PBBFAC,TXP,, | Performed by: NURSE PRACTITIONER

## 2019-05-21 NOTE — PROGRESS NOTES
[facility] - Established Patient    Last Clinic Visit: 2/13/19    CHIEF COMPLAINT: Follow-up for hepatitis B      HPI: This is a 49 y.o. Black or  male here for follow-up for chronic hepatitis B.       He is currently being evaluated for kidney transplant. ESRD presumed secondary to hypertension, has been on dialysis for the past 2 years (HD MWF). Other noted history: NICM, s/p AVR with mechanical valve (on coumadin). Also has history of heavy alcohol use (quit in 2016).      He reports being told about the hepatitis B when he had surgery a few years ago. Risk factors for hepatitis exposure - tattoos while in prison. No family history of hepatitis B or other liver disease. No IV or intranasal drug use. No known exposure from sexual contacts. Lives with his brother.       Labs:  - Hep B surface Ag (+) since 2/2012  - Hep B E Ag (+), E Ab (-)  - HBV DNA [id number],946  - Negative for Hep C and HIV     Started on Viread 300 mg once weekly after dialysis. Compliant with dosing.   Transaminases trending down (30-40s).       Had Fibroscan w/ kPa 45.9, F4 or cirrhosis.   Thrombocytopenia noted.     Interval history:   Hospitalized for PNA ~3 weeks ago. Doing well since, no new issues.    Still undergoing eval testing for kidney transplant clearance.  Scheduled for colonoscopy in June.     He denies any symptoms of hepatic decompensation including jaundice, hematemesis, melena, slowed mentation, abdominal distention, or lower extremity edema.      Health Maintenance:  HCC screening: [facility] US (2/5/19) with no focal hepatic lesions; needs AFP checked   Hep A vaccination: immunity unknown          Review of patient's allergies indicates:  No Known Allergies     Current Outpatient Medications on File Prior to Visit   Medication Sig Dispense Refill    amLODIPine (NORVASC) 10 MG tablet Take 1 tablet (10 mg total) by mouth once daily. 30 tablet 8    ascorbic acid, vitamin C, (VITAMIN C) 500 MG tablet Take  1 tablet (500 mg total) by mouth 2 (two) times daily.      b complex vitamins tablet Take 1 tablet by mouth once daily.      calcium acetate (PHOSLO) 667 mg capsule Take 1 capsule (667 mg total) by mouth 3 (three) times daily. 90 capsule 11    diltiaZEM (CARDIZEM CD) 360 MG 24 hr capsule Take 1 capsule (360 mg total) by mouth once daily. 90 capsule 1    epoetin vikki (PROCRIT) 10,000 unit/mL injection Inject 1 mL (10,000 Units total) into the skin every Mon, Wed, Fri. To be given with HD      fish oil-omega-3 fatty acids 300-1,000 mg capsule Take 2 g by mouth once daily.      furosemide (LASIX) 80 MG tablet TAKE ONE TABLET BY MOUTH EVERY DAY 30 tablet 9    hydrALAZINE (APRESOLINE) 100 MG tablet Take 100 mg by mouth 3 (three) times daily.      hydrOXYzine HCl (ATARAX) 25 MG tablet TAKE 1 TABLET BY MOUTH DAILY AS NEEDED FOR ITCHING. 30 tablet 8    hydrOXYzine pamoate (VISTARIL) 25 MG Cap Take 25 mg by mouth once daily.      labetalol (NORMODYNE) 100 MG tablet TAKE ONE TABLET BY MOUTH THREE TIMES DAILY AS NEEDED IF HEART RATE GREATER THAN 120 90 tablet 3    lisinopril (PRINIVIL,ZESTRIL) 40 MG tablet TAKE ONE TABLET BY MOUTH EVERY DAY 30 tablet 9    multivitamin with minerals tablet Take 1 tablet by mouth once daily.      pantoprazole (PROTONIX) 40 MG tablet TAKE ONE TABLET BY MOUTH EVERY DAY 30 tablet 9    ETHAN-RACQUEL RX 1- mg-mg-mcg Tab TAKE ONE TABLET BY MOUTH EVERY DAY 90 tablet 2    tenofovir (VIREAD) 300 mg Tab Take 1 tablet (300 mg total) by mouth once a week. AFTER DIALYSIS 4 tablet 12    vitamin D (VITAMIN D3) 1000 units Tab Take 1,000 Units by mouth 3 (three) times a week.      zolpidem (AMBIEN) 5 MG Tab TAKE ONE TABLET BY MOUTH EACH EVENING AT BEDTIME AS NEEDED FOR INSOMNIA 30 tablet 4    omega-3 fatty acids-vitamin E (FISH OIL) 1,000 mg Cap Take 1 capsule by mouth once daily.  0    warfarin (COUMADIN) 7.5 MG tablet Take 1 tablet (7.5 mg total) by mouth Daily. 30 tablet 11     No current  facility-administered medications on file prior to visit.          PMHX:  has a past medical history of Aortic valve stenosis, Atrial fibrillation, Atrial flutter, Cardiomyopathy, CHF (congestive heart failure), Drug-induced erectile dysfunction, ESRD due to hypertension, ESRD on dialysis, Hepatitis B, Hyperlipidemia, Hypertension, Secondary hyperparathyroidism of renal origin, Supraventricular tachycardia, Tachycardia, and Valvular regurgitation.    PSHX:  has a past surgical history that includes ASD repair; Cardiac valuve replacement (02/08/2017); Radiofrequency ablation (03/13/2017); Cardiac catheterization; and AV Graft Creation (Left, 03/2017).    FAMILY HISTORY:   Family History   Problem Relation Age of Onset    Leukemia Mother     Heart attack Father         massive MI at age 67    No Known Problems Sister     No Known Problems Brother     No Known Problems Sister     No Known Problems Sister     Heart failure Paternal Grandmother     Diabetes Paternal Aunt     Hypertension Paternal Aunt     Leukemia Paternal Aunt         CLL    Suicide Paternal Uncle     Anesthesia problems Paternal Uncle     Diabetes Paternal Aunt     Stroke Paternal Aunt     Valvular heart disease Maternal Aunt     Kidney disease Neg Hx     Colon cancer Neg Hx        SOCIAL HISTORY:   Social History     Tobacco Use   Smoking Status Never Smoker   Smokeless Tobacco Never Used       Social History     Substance and Sexual Activity   Alcohol Use No    Frequency: Never    Comment: quit in 2016; does have heavy drinking history; started drinking at age 13; was drinking a 12 pack over the weekend and two 24 ounces of beer a day during the week along with a pint of hard alcohol       Social History     Substance and Sexual Activity   Drug Use No         Review of Systems   Constitutional: Negative for appetite change, chills, fatigue, fever and unexpected weight change.   Eyes: Negative for visual disturbance.   Respiratory:  "Negative for cough and shortness of breath.    Cardiovascular: Negative for chest pain, palpitations and leg swelling.   Gastrointestinal: Negative for abdominal distention, abdominal pain, blood in stool, constipation, diarrhea, nausea and vomiting. No change in stool color.  Genitourinary: Negative for dysuria and hematuria. Denies dark urine.   Musculoskeletal: Negative for arthralgias, gait problem, joint swelling and myalgias.   Skin: Negative for color change, rash and wound. Denies itching.   Neurological: Negative for dizziness, tremors, speech difficulty, and headaches.   Hematological: (+) bruise/bleed easily.   Psychiatric/Behavioral: Negative for confusion, decreased concentration and sleep disturbance. Denies memory loss. Denies depression. The patient is not nervous/anxious.        Physical Exam   Constitutional: Well-nourished. No distress. Alert and oriented to person, place, and time.  Eyes: No scleral icterus.   Cardiovascular: Normal rate, regular rhythm and normal heart sounds. (+) murmur (mechanical valve)  Pulmonary/Chest: Respiratory effort and breath sounds normal. No respiratory distress.   Abdominal: Soft, non-tender. No distension; no ascites appreciated. There is no palpable hepatosplenomegaly. No hernia or mass.   Musculoskeletal: No edema.   Neurological: No tremor. Coordination and gait normal. No asterixis.    Skin: Skin is warm and dry. No rash or erythema. No jaundice. No telangiectasias or palmar erythema noted. LUE AVG, (+) thrill/bruit  Psychiatric: Normal mood and affect. Speech, behavior, and thought content normal. No depression or anxiety noted.       BP (!) 102/57 (BP Location: Right arm, Patient Position: Sitting, BP Method: Medium (Automatic))   Pulse 74   Ht 6' 2" (1.88 m)   Wt 94.2 kg (207 lb 10.8 oz)   SpO2 97%   BMI 26.66 kg/m²         LABS:  Lab Results   Component Value Date    WBC 6.19 05/01/2019    HGB 8.0 (L) 05/01/2019    HCT 23.8 (L) 05/01/2019    PLT 90 " (L) 05/01/2019     (L) 05/01/2019    K 4.2 05/01/2019    CREATININE 16.0 (H) 05/01/2019    ALT 42 04/30/2019    AST 49 (H) 04/30/2019    ALKPHOS 72 04/30/2019    BILITOT 0.7 04/30/2019    BILIDIR 0.3 02/05/2019    ALBUMIN 2.8 (L) 05/01/2019    INR 1.3 (H) 05/01/2019    ANASCREEN Negative <1:160 03/16/2017    FERRITIN 1,201 (H) 03/10/2017    FESATURATED 19 (L) 03/10/2017    CHOL 213 (H) 01/08/2019    TRIG 103 01/08/2019    LDLCALC 142.4 01/08/2019    HGBA1C 4.4 04/29/2019       No results found for: HEPAIGG    Hepatitis B Surface Ag   Date Value Ref Range Status   01/08/2019 Positive (A)  Final     Hep B Core Total Ab   Date Value Ref Range Status   01/08/2019 Positive (A)  Final     Hep B S Ab   Date Value Ref Range Status   02/05/2019 Negative  Final     Hepatitis C Ab   Date Value Ref Range Status   01/08/2019 Negative  Final     Immunization History   Administered Date(s) Administered    Influenza - Quadrivalent - PF 03/28/2019    PPD Test 02/13/2017    Pneumococcal Conjugate - 13 Valent 03/28/2019          DIAGNOSTIC STUDIES:  ABD. U/S  Results for orders placed during the hospital encounter of 02/05/19   US Abdomen Complete    Narrative EXAMINATION:  US ABDOMEN COMPLETE    CLINICAL HISTORY:  Awaiting organ transplant status    TECHNIQUE:  Complete abdominal ultrasound (including pancreas, liver, gallbladder, common bile duct, spleen, aorta, IVC, and kidneys) was performed.    COMPARISON:  CT dated 01/24/2019    FINDINGS:  Pancreas: Visualized portions of the pancreas appear grossly within normal limits noting a large portion of the pancreas is obscured by gas artifact.    Aorta: No aneurysm visualized.    Inferior vena cava: Visualized portions are normal in appearance.    Liver: Normal in size, measuring 15.2 cm. Homogeneous echotexture with no focal hepatic lesions visualized.    Biliary system: The gallbladder has no calculi. No gallbladder wall thickening.  Sonographic Gates sign is reported as  negative.  No pericholecystic fluid. The common duct is not dilated, measuring 4.5 mm.  No intrahepatic ductal dilatation.    Right kidney: Normal in size, measuring 11.5 cm with no hydronephrosis.Cortical thinning noted with diffuse increased parenchymal echogenicity, in keeping with chronic medical renal disease.  There are numerous small renal cyst present, some of which are minimally complicated by thin internal septation.  Largest complex cyst measures up to 18 mm at the upper pole.    Left kidney: Normal in size, measuring 11.6 cm with no hydronephrosis.Cortical thinning noted with diffuse increased parenchymal echogenicity, in keeping with chronic medical renal disease.  Numerous small renal cysts are present including some minimally complex cyst containing thin internal septation.  Largest complex cyst measures up to 12 mm at the lower pole.    Spleen: Normal in size, measuring 11.2 cm with a homogeneous echotexture.    Miscellaneous: No ascites.      Impression Findings in keeping with chronic medical renal disease with numerous small renal cysts, including several minimally complex cyst as detailed above.      Electronically signed by: Miguel Cazares MD  Date:    02/05/2019  Time:    13:35         ASSESSMENT / PLAN:  49 y.o. Black or  male with:    1. Chronic hepatitis B, E Ag (+) / E Ab (-)   -- Continue Viread 300 mg once weekly after dialysis. Compliance w/ dosing reinforced.   -- Repeat labs today to reassess HBV DNA. Continue to trend LFTs.  -- Need to obtain virologic control prior to proceeding with kidney transplant. Will need HBV treatment indefinitely.   -- Continue HCC screening every 6 months with ultrasound and AFP, next due 8/2019    2. Cirrhosis based on Fibroscan F4  -- Thrombocytopenia noted  -- Recommend TJ liver biopsy with portal pressure measurements given anticoagulation. Lengthy discussion about the significance of determining if he truly has cirrhosis.     3. ESRD  on dialysis  4. Pre-kidney transplant         Orders Placed This Encounter   Procedures    IR Transjugular Liver Biopsy    HEPATITIS B VIRAL DNA, QUANTITATIVE    Hepatitis A antibody, IgG    AFP tumor marker    Comprehensive metabolic panel    Protime-INR          EDUCATION / DISCUSSION:   Hepatitis B  -- Avoid alcohol.   -- Avoid raw seafood.   -- Discussed transmission of HBV, including sexual transmission.   -- Avoid sharing razors/toothbrushes, etc.   -- Limit acetaminophen to 2000 mg daily.  -- Advised immunization of all sexual partners and household members.  -- We discussed the increased risk of hepatocellular carcinoma due to active hepatitis B infection. Continued screening every 6 months with ultrasound and AFP tumor marker is recommended.   -- Natural history of HBV including possible progression to cirrhosis reviewed. Discussed potential outcomes of cirrhosis, including liver cancer, liver failure, liver transplant.  -- Discussed importance of compliance with medication regimen and risk of viral mutation if treatment is stopped prematurely.         Can discuss liver biopsy results via phone.  RTC in 3-6 months.         Thank you for allowing me to participate in the care of Isidro Dominguez, MABLEP-C  Hepatology and Liver Transplant

## 2019-05-21 NOTE — TELEPHONE ENCOUNTER
Patient needs TJ Liver biopsy. The patient is on Coumadin. Will send message to Cardiology for assistance.

## 2019-05-23 LAB
HBV DNA SERPL NAA+PROBE-ACNC: 591 IU/ML
HBV DNA SERPL NAA+PROBE-LOG IU: 2.77 LOG (10) IU/ML
HBV DNA SERPL QL NAA+PROBE: DETECTED

## 2019-05-24 ENCOUNTER — TELEPHONE (OUTPATIENT)
Dept: ENDOSCOPY | Facility: HOSPITAL | Age: 49
End: 2019-05-24

## 2019-05-24 NOTE — TELEPHONE ENCOUNTER
Pt rescheduled colonoscopy from 7/9/19 to 6/11/19 due to endo schedule change.     New Miralax instructions sent via mail. Will notify GI staff of date change for coumadin clearance.

## 2019-05-27 ENCOUNTER — TELEPHONE (OUTPATIENT)
Dept: GASTROENTEROLOGY | Facility: CLINIC | Age: 49
End: 2019-05-27

## 2019-05-27 NOTE — TELEPHONE ENCOUNTER
Patient scheduled for procedure on July 9th would it be ok if patient stop Coumadin 5 days prior to procedure.

## 2019-05-28 ENCOUNTER — TELEPHONE (OUTPATIENT)
Dept: HEPATOLOGY | Facility: CLINIC | Age: 49
End: 2019-05-28

## 2019-05-28 NOTE — TELEPHONE ENCOUNTER
----- Message from Lina Dominguez NP sent at 5/27/2019 11:50 AM CDT -----  Please inform patient- Hepatitis B virus level has significantly decreased (which is good). Liver enzymes are now normal. Continue tenofovir (viread) once weekly without missing any doses.

## 2019-05-29 LAB — FUNGUS SPEC CULT: NORMAL

## 2019-06-07 ENCOUNTER — TELEPHONE (OUTPATIENT)
Dept: ENDOSCOPY | Facility: HOSPITAL | Age: 49
End: 2019-06-07

## 2019-06-07 NOTE — TELEPHONE ENCOUNTER
Pt left message on [employer]. Retrieved message and returned pts call. Pt stated that he already spoke to someone.

## 2019-06-11 ENCOUNTER — TELEPHONE (OUTPATIENT)
Dept: HEPATOLOGY | Facility: CLINIC | Age: 49
End: 2019-06-11

## 2019-06-11 DIAGNOSIS — B18.1 CHRONIC VIRAL HEPATITIS B WITHOUT DELTA AGENT AND WITHOUT COMA: Primary | ICD-10-CM

## 2019-06-11 NOTE — TELEPHONE ENCOUNTER
Case reviewed w/ Dr. Martini. Fibroscan suggests cirrhosis though unclear if accurate. Thrombocytopenia noted though no other findings of portal HTN. Initially recommended liver biopsy though this would require hospital admission per patient's cardiologist.     Will attempt to obtain EGD with upcoming colonoscopy to evaluate for varices. Message sent to GI provider to coordinate. Plan to repeat ultrasound as well. Can then discuss patient at liver discussion meeting once testing complete.     Recommendations discussed with patient, he is agreeable.

## 2019-06-12 ENCOUNTER — TELEPHONE (OUTPATIENT)
Dept: ENDOSCOPY | Facility: HOSPITAL | Age: 49
End: 2019-06-12

## 2019-06-12 DIAGNOSIS — B18.1 CHRONIC VIRAL HEPATITIS B WITHOUT DELTA AGENT AND WITHOUT COMA: Primary | ICD-10-CM

## 2019-06-12 DIAGNOSIS — Z01.818 PRE-TRANSPLANT EVALUATION FOR KIDNEY TRANSPLANT: ICD-10-CM

## 2019-06-12 DIAGNOSIS — K74.00 HEPATIC FIBROSIS: ICD-10-CM

## 2019-06-12 NOTE — TELEPHONE ENCOUNTER
----- Message from Ca Momin sent at 6/10/2019  9:13 AM CDT -----  Contact: pt   Type:  Needs Medical Advice    Who Called: RADHA PONCE JR.  Symptoms (please be specific):   How long has patient had these symptoms:  Pharmacy name and phone #:   Would the patient rather a call back or a response via [facility]? Call   Best Call Back Number: [phone] (home)   Additional Information: pt is requesting a call back from the nurse in regards to the pt rescheduling his colonoscopy

## 2019-06-12 NOTE — TELEPHONE ENCOUNTER
----- Message from Memo Thompson MD sent at 6/11/2019  5:39 PM CDT -----  Sounds good.  I will reach out and copy Stacie Champion on this message so that she can be looking for the change in provider that I recommended.   Dr. Hampton  ----- Message -----  From: Lina Dominguez NP  Sent: 6/11/2019   2:33 PM  To: Memo Thompson MD    Yes, it would be for variceal screening and I can definitely place orders. I will reach out to the patient to make sure that he is okay with adding the EGD at that time so that we can coordinate/change providers.     Thanks,  Lina    ----- Message -----  From: Memo Thompson MD  Sent: 6/11/2019  12:55 PM  To: ALEX Murdock,   I assume that you are wanting to screen for varices?  If that is the case, I would want to have this all done by one of the GI doctors that perform variceal banding.  The only doctors that do that here are Dr. John and Da and the occasional [facility] doctor that comes in.  Let me know your thoughts.  If that is something that you want to have done, orders will need to be placed and we will need to have the procedures changed to another physician.   TH  ----- Message -----  From: Lina Dominguez NP  Sent: 6/11/2019  11:18 AM  To: Memo Thompson MD    Hi Dr. Thompson,    I know that this patient is having a colonoscopy on 6/20. Is there any way we can add an EGD to be done at the same time? He is pre-kidney transplant and had testing suggestive of cirrhosis. Would ideally like to coordinate procedures if possible.    Lina Fields

## 2019-06-12 NOTE — TELEPHONE ENCOUNTER
Please enter to case for EGD to be added to the colonoscopy and scheduled with appropriate physician.

## 2019-06-12 NOTE — TELEPHONE ENCOUNTER
Fuad Gudino MD  You 2 days ago      No he cannot stop coumadin for 5 days as he has has a mechanical valve. He will need to be bridged with heparin. What is the procedure?

## 2019-06-12 NOTE — TELEPHONE ENCOUNTER
Spoke with pt in regards to adding an EGD to the colonoscopy orders and that it will require changing the appt to a GI physician. Pt stated agreement to make the change. Appt rescheduled to 8/22/19.     New Miralax instructions sent via mail.     In addition I will discuss with endoscopy lab nurse in regards to the process to get pt set up for a Heprin bridge prior to the procedure.

## 2019-06-12 NOTE — TELEPHONE ENCOUNTER
Pt colonoscopy/EGD has been rescheduled for 8/22/19.     Please send orders to coumadin clinic for pt to use Heprin bridge prior to the procedure.

## 2019-06-18 ENCOUNTER — OFFICE VISIT (OUTPATIENT)
Dept: CARDIOLOGY | Facility: CLINIC | Age: 49
End: 2019-06-18
Payer: COMMERCIAL

## 2019-06-18 ENCOUNTER — TELEPHONE (OUTPATIENT)
Dept: CARDIOLOGY | Facility: CLINIC | Age: 49
End: 2019-06-18

## 2019-06-18 ENCOUNTER — HOSPITAL ENCOUNTER (OUTPATIENT)
Dept: RADIOLOGY | Facility: HOSPITAL | Age: 49
Discharge: HOME | End: 2019-06-18
Attending: NURSE PRACTITIONER
Payer: COMMERCIAL

## 2019-06-18 VITALS
SYSTOLIC BLOOD PRESSURE: 128 MMHG | DIASTOLIC BLOOD PRESSURE: 78 MMHG | WEIGHT: 209 LBS | BODY MASS INDEX: 26.82 KG/M2 | HEIGHT: 74 IN

## 2019-06-18 DIAGNOSIS — I47.19 ATRIAL TACHYCARDIA: ICD-10-CM

## 2019-06-18 DIAGNOSIS — I50.32 CHRONIC DIASTOLIC HEART FAILURE: ICD-10-CM

## 2019-06-18 DIAGNOSIS — Z79.01 ANTICOAGULATED: ICD-10-CM

## 2019-06-18 DIAGNOSIS — Q24.9 ADULT CONGENITAL HEART DISEASE: ICD-10-CM

## 2019-06-18 DIAGNOSIS — I10 ESSENTIAL HYPERTENSION: ICD-10-CM

## 2019-06-18 DIAGNOSIS — I42.8 NONISCHEMIC CARDIOMYOPATHY: ICD-10-CM

## 2019-06-18 DIAGNOSIS — B18.1 CHRONIC VIRAL HEPATITIS B WITHOUT DELTA AGENT AND WITHOUT COMA: ICD-10-CM

## 2019-06-18 DIAGNOSIS — N18.6 ESRD (END STAGE RENAL DISEASE) ON DIALYSIS: Chronic | ICD-10-CM

## 2019-06-18 DIAGNOSIS — I35.0 NONRHEUMATIC AORTIC VALVE STENOSIS: ICD-10-CM

## 2019-06-18 DIAGNOSIS — Z99.2 ESRD (END STAGE RENAL DISEASE) ON DIALYSIS: Chronic | ICD-10-CM

## 2019-06-18 DIAGNOSIS — I11.0 HYPERTENSIVE CARDIOMEGALY WITH HEART FAILURE: Primary | ICD-10-CM

## 2019-06-18 DIAGNOSIS — Z95.2 S/P AVR (AORTIC VALVE REPLACEMENT): ICD-10-CM

## 2019-06-18 DIAGNOSIS — Z98.890 HISTORY OF RADIOFREQUENCY ABLATION FOR COMPLEX RIGHT ATRIAL ARRHYTHMIA: ICD-10-CM

## 2019-06-18 PROCEDURE — 99215 OFFICE O/P EST HI 40 MIN: CPT | Mod: S$GLB,TXP,, | Performed by: INTERNAL MEDICINE

## 2019-06-18 PROCEDURE — 99213 OFFICE O/P EST LOW 20 MIN: CPT | Mod: PBBFAC,TXP | Performed by: INTERNAL MEDICINE

## 2019-06-18 PROCEDURE — 99999 PR PBB SHADOW E&M-EST. PATIENT-LVL III: ICD-10-PCS | Mod: PBBFAC,TXP,, | Performed by: INTERNAL MEDICINE

## 2019-06-18 PROCEDURE — 99215 PR OFFICE/OUTPT VISIT, EST, LEVL V, 40-54 MIN: ICD-10-PCS | Mod: S$GLB,TXP,, | Performed by: INTERNAL MEDICINE

## 2019-06-18 PROCEDURE — 99999 PR PBB SHADOW E&M-EST. PATIENT-LVL III: CPT | Mod: PBBFAC,TXP,, | Performed by: INTERNAL MEDICINE

## 2019-06-18 RX ORDER — HEPARIN SODIUM 5000 [USP'U]/ML
5000 INJECTION, SOLUTION INTRAVENOUS; SUBCUTANEOUS EVERY 8 HOURS
Qty: 15 ML | Refills: 0 | Status: SHIPPED | OUTPATIENT
Start: 2019-06-18 | End: 2019-08-27 | Stop reason: SDUPTHER

## 2019-06-18 NOTE — PROGRESS NOTES
Subjective:   Patient ID:  Isidro White Jr. is a 49 y.o. male who presents for cardiac consult of Hypertension; Congestive Heart Failure; and Atrial Fibrillation      Chest Pain    Associated symptoms include malaise/fatigue. Pertinent negatives include no palpitations or shortness of breath.   His past medical history is significant for CHF.   Congestive Heart Failure   Associated symptoms include chest pain. Pertinent negatives include no palpitations or shortness of breath.   Hypertension   Associated symptoms include chest pain and malaise/fatigue. Pertinent negatives include no palpitations or shortness of breath.     The patient came in today for cardiac consult of Hypertension; Congestive Heart Failure; and Atrial Fibrillation    Isidro White Jr. is a 49 y.o. male pt with ASD closure at age 8 ([facility]), NICM, s/p AVR with a 22 mm mechanical valve and TV annuloplasty with a 28 mm ring 3/17, HTN, SVT, EP performed RFA (3/13/17) and has been on HD - MWF since Feb 8,2016 since his surgery, referred by Dr Hurst (Nephrology) for evaluation and management of PH as he is being considered for kidney transplant, seen by Dr. Braun in [facility] here for CV follow up.     8/9/28  He had a RHC 6/18 showing normal cardiac output, normal filling pressures and MILD PH with mean pap 32 mmHg.  Pt feels very weak on HD days. After RHC HR has been in 109-110 range, started on cardizem. HR now normal. Pt felt palpitations now feels ok.     12/13/18  Holter from 9/4/18 revealed freq PVCs, dilt increased to 240 mg. No palpitations. Has been doing well lately, BP is well controlled. Labwork from HD has been normal. Still makes some urine, says he has some hematuria. Will need annual stress and 2D echo. He is also undergoing workup for HepB will see Dr. Youngblood.     3/15/19  Recent ER eval at [facility] - He presented with chest pain and shortness of breath. He is a dialysis patient has a history of anemia. He states  ""I believe that the lack of oxygen". Patient has received blood transfusions in the past but not recently. Patient had dialysis yesterday and is a Monday Wednesday Friday dialysis patient. His troponin is mildly elevated. His chest x-ray is relatively clear. We do not see a large effusion and there is no obvious infiltrate at this time. Patient states at times he has chest pain and it feels like he is smothering. The safest plan is to admit the patient and repeat his cardiac enzymes and consider given the patient a blood transfusion. However the patient is a dialysis patient so we are unable to admit him at this facility. Patient nephrologist is an [facility] and [facility]  Pt Left AMA and now here for follow up.   Pt has SOB usually at night, feels suffocated and can hear himself snore and wakes up. No prior sleep study.      6/18/19  Pt will need EGD/Cscope. Will be bridged with heparin while holding coumadin and followed at coumadin clinic. Pt also needs liver biopsy, he will be ruled out for esophageal varicies. He also had recent admission for PNA, tx with abx. Now feels well, breathing normal. No CP/SOB.     Patient feels no chest pain, no leg swelling, no PND, no palpitation, no dizziness, no syncope, no CNS symptoms.    Patient has fairly good exercise tolerance.    Patient is compliant with medications.    2/19 Stress test  · The perfusion scan is free of evidence from myocardial ischemia or injury.  · An ejection fraction of 75 % at rest  · Resting wall motion is physiologic.  · Post stress wall motion is physiologic.  · LV cavity size is normal at rest.  · There was no ST segment deviation noted during stress.  · The EKG portion of this study is negative for myocardial ischemia.  · Arrhythmias during stress: rare PVCs.  · The patient reported SOB (non-anginal) during the stress test.  · There is no prior study available for comparison.        2D ECHO 05/01/2019   CONCLUSIONS     1 " - Mild left atrial enlargement.     2 - Concentric hypertrophy.     3 - No wall motion abnormalities.     4 - Normal left ventricular systolic function (EF 60-65%).     5 - Impaired LV relaxation, elevated LAP (grade 2 diastolic dysfunction).     6 - Normal right ventricular systolic function .     7 - Pulmonary hypertension. The estimated PA systolic pressure is greater than 57 mmHg.     8 - Aortic valve prosthesis, JHOANA = 2.01 cm2, AVAi = 0.94 cm2/m2, peak velocity = 3.89 m/s, mean gradient = 31 mmHg.     9 - Mild tricuspid regurgitation.           9/4/18 HOLTER  TEST DESCRIPTION   PREDOMINANT RHYTHM  1. Sinus rhythm with heart rates varying between 69 and 124 bpm with an average of 85 bpm.   VENTRICULAR ARRHYTHMIAS  1. There were frequent PVCs totalling 1738 and averaging 36 per hour.  There were 9 bigeminal cycles.  There were 3 triplets.   2. There was one (6 beat) run of non-sustained ventricular tachycardia. The rate was 125 bpm.     SUPRA VENTRICULAR ARRHYTHMIAS  1. There were occasional PACs totalling 995 and averaging 20 per hour.  There were 9 triplets.   2. There were 4 (12 beat) runs of non-sustained SVT. The rate was 145 bpm.             Cath 2/17  1. Coronary angiography.      a.     Left main widely patent.      b.     LAD widely patent.      c.     Ramus widely patent and massive in size.      d.     Circumflex widely patent with a small OM 1 and OM 2 branch.      e.     Right coronary widely patent.  2. Hemodynamics:  Right heart catheterization.      a.     Pulmonary capillary wedge pressure 33 at end-expiration.      b.     Pulmonary artery pressure 78/42.      c.     Right ventricular pressure 70/21.      d.     Right atrial pressure 22 with a peak V-wave of 24.      e.     Cardiac output by thermodilution is between 6 and 7       L/minute, by Patricia 6.1 L/minute.    CONCLUSION  1. Normal coronary arteries.  2. Pulmonary hypertension.  3. Severe aortic insufficiency.      Past Medical History:    Diagnosis Date    Aortic valve stenosis     s/p mechanical AVR    Atrial fibrillation     Atrial flutter     Cardiomyopathy     CHF (congestive heart failure)     Drug-induced erectile dysfunction     ESRD due to hypertension     HD M, W, F    ESRD on dialysis     Hepatitis B     Hyperlipidemia     Hypertension     Secondary hyperparathyroidism of renal origin     Supraventricular tachycardia     atrial tachycardia    Tachycardia     Valvular regurgitation     AI, TR       Past Surgical History:   Procedure Laterality Date    ABLATION N/A 3/13/2017    Performed by [facility]el [facility]allpa [facility] at [facility] CATH LAB    ASD REPAIR      age 8 [facility]    AV Graft Creation Left 03/2017    CARDIAC CATHETERIZATION      [facility]     CARDIAC VALVE SURGERY  02/08/2017    22 mm Medtronic AV, 28 mm TV Medtronic annuloplaty ring    [id number] Accuseal Left 3/16/2017    Performed by Alcides Manning MD at [facility] OR 2ND FLR    RADIOFREQUENCY ABLATION  03/13/2017    Atrial tachycardia    REDO STERNOTOMY WITH AORTIC VALVE REPLACEMENT/redo sternotomy N/A 2/8/2017    Performed by Jose [facility]astelum MD at [facility] OR 2ND FLR    REPAIR-VALVE-TRICUSPID  2/8/2017    Performed by [facility]um [facility] at [facility] OR 2ND FLR    REPLACEMENT-VALVE-AORTIC N/A 2/8/2017    Performed by [facility]um [facility] at [facility] OR 2ND FLR       Social History     Tobacco Use    Smoking status: Never Smoker    Smokeless tobacco: Never Used   Substance Use Topics    Alcohol use: No     Frequency: Never     Comment: quit in 2016; does have heavy drinking history; started drinking at age 13; was drinking a 12 pack over the weekend and two 24 ounces of beer a day during the week along with a pint of hard alcohol    Drug use: No       Family History   Problem Relation Age of Onset    Leukemia Mother     Heart attack Father         massive MI at age 67    No Known Problems Sister     No Known Problems Brother     No Known  Problems Sister     No Known Problems Sister     Heart failure Paternal Grandmother     Diabetes Paternal Aunt     Hypertension Paternal Aunt     Leukemia Paternal Aunt         CLL    Suicide Paternal Uncle     Anesthesia problems Paternal Uncle     Diabetes Paternal Aunt     Stroke Paternal Aunt     Valvular heart disease Maternal Aunt     Kidney disease Neg Hx     Colon cancer Neg Hx        Patient's Medications   New Prescriptions    HEPARIN SODIUM,PORCINE (HEPARIN, PORCINE,) 5,000 UNIT/ML INJECTION    Inject 1 mL (5,000 Units total) into the skin every 8 (eight) hours. Take 3 days before procedure and 3 days after while back on coumadin   Previous Medications    AMLODIPINE (NORVASC) 10 MG TABLET    Take 1 tablet (10 mg total) by mouth once daily.    ASCORBIC ACID, VITAMIN C, (VITAMIN C) 500 MG TABLET    Take 1 tablet (500 mg total) by mouth 2 (two) times daily.    B COMPLEX VITAMINS TABLET    Take 1 tablet by mouth once daily.    CALCIUM ACETATE (PHOSLO) 667 MG CAPSULE    Take 1 capsule (667 mg total) by mouth 3 (three) times daily.    DILTIAZEM (CARDIZEM CD) 360 MG 24 HR CAPSULE    Take 1 capsule (360 mg total) by mouth once daily.    EPOETIN TORRIE (PROCRIT) 10,000 UNIT/ML INJECTION    Inject 1 mL (10,000 Units total) into the skin every Mon, Wed, Fri. To be given with HD    FISH OIL-OMEGA-3 FATTY ACIDS 300-1,000 MG CAPSULE    Take 2 g by mouth once daily.    FUROSEMIDE (LASIX) 80 MG TABLET    TAKE ONE TABLET BY MOUTH EVERY DAY    HYDRALAZINE (APRESOLINE) 100 MG TABLET    Take 100 mg by mouth 3 (three) times daily.    HYDROXYZINE HCL (ATARAX) 25 MG TABLET    TAKE 1 TABLET BY MOUTH DAILY AS NEEDED FOR ITCHING.    HYDROXYZINE PAMOATE (VISTARIL) 25 MG CAP    Take 25 mg by mouth once daily.    LABETALOL (NORMODYNE) 100 MG TABLET    TAKE ONE TABLET BY MOUTH THREE TIMES DAILY AS NEEDED IF HEART RATE GREATER THAN 120    LISINOPRIL (PRINIVIL,ZESTRIL) 40 MG TABLET    TAKE ONE TABLET BY MOUTH EVERY DAY     "MULTIVITAMIN WITH MINERALS TABLET    Take 1 tablet by mouth once daily.    OMEGA-3 FATTY ACIDS-VITAMIN E (FISH OIL) 1,000 MG CAP    Take 1 capsule by mouth once daily.    PANTOPRAZOLE (PROTONIX) 40 MG TABLET    TAKE ONE TABLET BY MOUTH EVERY DAY    ETHAN-RACQUEL RX 1- MG-MG-MCG TAB    TAKE ONE TABLET BY MOUTH EVERY DAY    TENOFOVIR (VIREAD) 300 MG TAB    Take 1 tablet (300 mg total) by mouth once a week. AFTER DIALYSIS    VITAMIN D (VITAMIN D3) 1000 UNITS TAB    Take 1,000 Units by mouth 3 (three) times a week.    WARFARIN (COUMADIN) 7.5 MG TABLET    Take 1 tablet (7.5 mg total) by mouth Daily.    ZOLPIDEM (AMBIEN) 5 MG TAB    TAKE ONE TABLET BY MOUTH EACH EVENING AT BEDTIME AS NEEDED FOR INSOMNIA   Modified Medications    No medications on file   Discontinued Medications    No medications on file       Review of Systems   Constitutional: Positive for malaise/fatigue.   HENT: Negative.    Eyes: Negative.    Respiratory: Negative for shortness of breath.    Cardiovascular: Positive for chest pain. Negative for palpitations.   Gastrointestinal: Negative.    Genitourinary: Positive for hematuria.   Musculoskeletal: Negative.    Skin: Negative.    Neurological: Negative.    Endo/Heme/Allergies: Negative.    Psychiatric/Behavioral: Negative.    All 12 systems otherwise negative.      Wt Readings from Last 3 Encounters:   06/18/19 94.8 kg (208 lb 15.9 oz)   05/21/19 94.2 kg (207 lb 10.8 oz)   05/16/19 91.9 kg (202 lb 9.6 oz)     Temp Readings from Last 3 Encounters:   05/01/19 98 °F (36.7 °C) (Oral)   04/17/19 99.2 °F (37.3 °C) (Oral)   03/28/19 98 °F (36.7 °C) (Oral)     BP Readings from Last 3 Encounters:   06/18/19 128/78   05/21/19 (!) 102/57   05/16/19 90/62     Pulse Readings from Last 3 Encounters:   05/21/19 74   05/16/19 80   05/01/19 102       /78 (BP Location: Right arm)   Ht 6' 2" (1.88 m)   Wt 94.8 kg (208 lb 15.9 oz)   BMI 26.83 kg/m²      Objective:   Physical Exam   Constitutional: He is " oriented to person, place, and time. He appears well-developed and well-nourished. No distress.   HENT:   Head: Normocephalic and atraumatic.   Nose: Nose normal.   Mouth/Throat: Oropharynx is clear and moist.   Eyes: Conjunctivae and EOM are normal. No scleral icterus.   Neck: Normal range of motion. Neck supple. No JVD present. No thyromegaly present.   Cardiovascular: Normal rate, regular rhythm, S1 normal and S2 normal. Exam reveals no gallop, no S3, no S4 and no friction rub.   Murmur heard.   Midsystolic murmur is present at the upper right sternal border.  [place] S2   Pulmonary/Chest: Effort normal and breath sounds normal. No stridor. No respiratory distress. He has no wheezes. He has no rales. He exhibits no tenderness.   Abdominal: Soft. Bowel sounds are normal. He exhibits no distension and no mass. There is no tenderness. There is no rebound.   Genitourinary:   Genitourinary Comments: Deferred   Musculoskeletal: Normal range of motion. He exhibits no edema, tenderness or deformity.   Lymphadenopathy:     He has no cervical adenopathy.   Neurological: He is alert and oriented to person, place, and time. He exhibits normal muscle tone. Coordination normal.   Skin: Skin is warm and dry. No rash noted. He is not diaphoretic. No erythema. No pallor.   Psychiatric: He has a normal mood and affect. His behavior is normal. Judgment and thought content normal.   Nursing note and vitals reviewed.      Lab Results   Component Value Date     05/21/2019    K 4.1 05/21/2019    CL 98 05/21/2019    CO2 27 05/21/2019    BUN 55 (H) 05/21/2019    CREATININE 13.1 (H) 05/21/2019    GLU 87 05/21/2019    HGBA1C 4.4 04/29/2019    MG 2.1 04/30/2019    AST 22 05/21/2019    ALT 24 05/21/2019    ALBUMIN 2.8 (L) 05/21/2019    PROT 8.2 05/21/2019    BILITOT 0.4 05/21/2019    WBC 6.19 05/01/2019    HGB 8.0 (L) 05/01/2019    HCT 23.8 (L) 05/01/2019    HCT 25 (L) 02/08/2017     (H) 05/01/2019    PLT 90 (L) 05/01/2019    INR  1.1 05/21/2019    CHOL 213 (H) 01/08/2019    HDL 50 01/08/2019    LDLCALC 142.4 01/08/2019    TRIG 103 01/08/2019     (H) 04/17/2019     Assessment:      1. Hypertensive cardiomegaly with heart failure    2. Nonrheumatic aortic valve stenosis    3. Adult congenital heart disease    4. S/P AVR (aortic valve replacement)    5. Chronic diastolic heart failure    6. Nonischemic cardiomyopathy    7. Atrial tachycardia    8. History of radiofrequency ablation for complex right atrial arrhythmia    9. Essential hypertension    10. ESRD (end stage renal disease) on dialysis    11. Anticoagulated        Plan:   1. Chronic Diastolic HF  - recovered EF  - cont low salt diet  - pt euvolemic     2. S/p mech AVR  - cont coumadin, check INR  - f/u with coumadin clinic here    3. ESRD  - cont HD  - stable CV status for transplant, recent stress/echo negative    4. HTN  - cont meds    6. Tachycardia sec to PVCs  - cont BB and CCB    7. HLD  - cont statin    8. Sleep apnea symptoms  - refer to sleep study    9. Preop for Cscope/EGD  - low cardiac risk, ok to proceed  - refer to coumadin clinic to manage   - heparin SQ ordered to bridge periop     Thank you for allowing me to participate in this patient's care. Please do not hesitate to contact me with any questions or concerns. Consult note has been forwarded to the referral physician.

## 2019-06-18 NOTE — PATIENT INSTRUCTIONS
Coordinate with dialysis center to receive IV heparin bridging prior to procedures, will enroll in [facility].

## 2019-06-20 ENCOUNTER — ANTI-COAG VISIT (OUTPATIENT)
Dept: CARDIOLOGY | Facility: CLINIC | Age: 49
End: 2019-06-20
Payer: COMMERCIAL

## 2019-06-20 ENCOUNTER — TELEPHONE (OUTPATIENT)
Dept: CARDIOLOGY | Facility: CLINIC | Age: 49
End: 2019-06-20

## 2019-06-20 ENCOUNTER — HOSPITAL ENCOUNTER (OUTPATIENT)
Dept: RADIOLOGY | Facility: HOSPITAL | Age: 49
Discharge: HOME | End: 2019-06-20
Attending: NURSE PRACTITIONER
Payer: COMMERCIAL

## 2019-06-20 DIAGNOSIS — Z79.01 LONG TERM (CURRENT) USE OF ANTICOAGULANTS: Primary | ICD-10-CM

## 2019-06-20 DIAGNOSIS — Z95.2 S/P AVR (AORTIC VALVE REPLACEMENT): ICD-10-CM

## 2019-06-20 LAB — INR PPP: 1.3 (ref 2–3)

## 2019-06-20 PROCEDURE — 85610 PROTHROMBIN TIME: CPT | Mod: PBBFAC

## 2019-06-20 PROCEDURE — 93793 ANTICOAG MGMT PT WARFARIN: CPT | Mod: S$GLB,,,

## 2019-06-20 PROCEDURE — 93793 PR ANTICOAGULANT MGMT FOR PT TAKING WARFARIN: ICD-10-PCS | Mod: S$GLB,,,

## 2019-06-20 PROCEDURE — 76700 US EXAM ABDOM COMPLETE: CPT | Mod: TC,NTX

## 2019-06-20 NOTE — TELEPHONE ENCOUNTER
Returned call to pharmacy--placed on extended hold with long wait time due to large volume of calls--

## 2019-06-20 NOTE — PROGRESS NOTES
Mr. White is a new patient within our coumadin clinic referred by Dr. Gudino.  Medications and PMH reviewed (AVR, Afib, ESRD, HTN, CHF, Hep B).  Anticoagulation previously managed by Dr. Skelton at [facility] , patient has been on warfarin for 3 years.     After a review of food and lifestyle interactions.  Patient agrees to 1 serving of a dark green vegetable per week.     Patient's INR is sub-therapeutic at 1.3.  Patient insists he has been taking medications as prescribed.  No abnormal pain, swelling or weakness noted.  Instructions given for patient to take warfarin 15mg on today; then resume current dose of 7.5mg every evening.  Patient voiced understanding.  Follow-up on Tuesday, 6/25/19.  Importance of keeping f/u appt discussed.  Patient will also check on price of heparin for upcoming procedure.    A full discussion of the nature of anticoagulants has been carried out.  A benefit risk analysis has been presented to the patient, so that they understand the justification for choosing anticoagulation at this time. The need for frequent and regular monitoring, precise dosage adjustment and compliance is stressed.  Side effects of potential bleeding are discussed.  The patient should avoid any OTC items containing aspirin or ibuprofen, and should avoid great swings in general diet.  Avoid alcohol consumption.  Call if any signs of abnormal bleeding.      Taking Coumadin   Coumadin (warfarin) helps keep your blood from clotting. But it also increases your risk for bleeding. Because of this, it must be taken exactly as directed. You also need to protect yourself from injury.     Follow These Tips   Take Coumadin at the same time each day. If you miss a dose, take it as soon as you remember (if less then 4 hours); otherwise, if it's almost time for your next dose, skip the missed dose. Do not take a double dose.   Go for your blood (protime/INR) tests as often as directed. Note that diet and   medication  can affect your protime/INR level.             REMEMBER:   When value decreases from therapeutic range, the blood                                           gets thicker and when value increases, the blood gets thinner.                                       Dont take any other medications without checking with your healthcare provider first. This includes aspirin, vitamins, and herbal and other dietary supplements.   Tell all healthcare providers that you take Coumadin. Its also a good idea to carry a medical ID card or wear a medical-alert bracelet.   Use a soft toothbrush and an electric razor.   Dont go barefoot. And dont trim corns or calluses yourself.    When to Call Your Healthcare Provider   Call your healthcare provider right away before you take your next dose of Coumadin if you have any of these problems:   Bleeding that doesnt stop in 10 minutes   Coughing or throwing up blood   Diarrhea or bleeding hemorrhoids   Dark urine or black stools   Red or black-and-blue marks on the skin that get larger   A fever or an illness that gets worse   Dizziness or fatigue   Chest pain or trouble breathing   A serious fall or a blow to the head    Keep Your Diet Steady   Keep your diet pretty much the same each day. Thats because many foods contain vitamin K. Vitamin K helps your blood clot. So eating foods that contain vitamin K can affect the way Coumadin works. You dont need to avoid foods that have vitamin K. But you do need to keep the amount of them you eat steady (about the same day to day). If you change your diet for any reason, such as due to illness or to lose weight, be sure to tell your doctor.     Examples of foods high in vitamin K are brussels sprouts, avocado, broccoli, cabbage, coleslaw, mustard greens, mariajose greens, turnip greens, kale, spinach, brandon lettuce, and some other leafy green vegetables. Foods that are mixed with mayonnaise, such as tuna, chicken, and potato salads.  Oils, such  as soybean, canola, and Vegetable oils, are also high in vitamin K.       Other food products can affect the way Coumadin works in your body:   Food products that may affect blood clotting include cranberries and cranberry juice, grapefruit juice, fish oil supplements, garlic, mary, licorice, and turmeric.   Herbs used in herbal teas or supplements can also affect blood clotting. Keep the amount of herbal teas and supplements you use steady.   Alcohol can increase the effect of Coumadin in your body.

## 2019-06-20 NOTE — TELEPHONE ENCOUNTER
----- Message from Jose Armando Ortiz sent at 6/20/2019  4:36 PM CDT -----  [facility]  Type:  Pharmacy Calling to Clarify an RX    Name of Caller:[facility]  [facility]  Prescription Name:n/a  What do they need to clarify?:requesting syringes for pt to give him self his own medication  Best Call Back Number:[phone]  Additional Information: none    Thanks,  Jose Armando Ortiz

## 2019-06-21 ENCOUNTER — TELEPHONE (OUTPATIENT)
Dept: CARDIOLOGY | Facility: CLINIC | Age: 49
End: 2019-06-21

## 2019-06-21 ENCOUNTER — TELEPHONE (OUTPATIENT)
Dept: HEPATOLOGY | Facility: CLINIC | Age: 49
End: 2019-06-21

## 2019-06-21 NOTE — TELEPHONE ENCOUNTER
----- Message from Bryanna Rogers sent at 6/21/2019 11:07 AM CDT -----  Contact: [facility]  Type:  Pharmacy Calling to Clarify an RX    Name of Caller:[facility]  Pharmacy Name:[facility]  Prescription Name:Hepron  What do they need to clarify?:jeannette Hernandez Call Back Number:[phone]  Additional Information: na

## 2019-06-21 NOTE — TELEPHONE ENCOUNTER
Returned call to pharmacy--spoke with Vick--states wants to clarify dosage of heparin as 15 ml every 8 hours to make sure patient will have enough coverage --please advise

## 2019-06-21 NOTE — TELEPHONE ENCOUNTER
----- Message from Lina Dominguez NP sent at 6/21/2019  9:14 AM CDT -----  Please inform patient- Ultrasound is stable, no abnormal findings in the liver. Will await results from EGD (upper scope).

## 2019-06-21 NOTE — TELEPHONE ENCOUNTER
Pharmacy contacted. Spoke to Lisa, Heparin rx quantity increased to 18ml (total).  Patient will be injecting 1ml every 8 hours x 6 days.     Syringe: 28gauge 0.5in 1cc    Copayment verified: $0; Pharmacist advised patient should  medications asap just in case there is a change in insurance.    Patient notified.

## 2019-06-25 ENCOUNTER — ANTI-COAG VISIT (OUTPATIENT)
Dept: CARDIOLOGY | Facility: CLINIC | Age: 49
End: 2019-06-25
Payer: COMMERCIAL

## 2019-06-25 DIAGNOSIS — I48.91 ATRIAL FIBRILLATION, UNSPECIFIED TYPE: ICD-10-CM

## 2019-06-25 DIAGNOSIS — Z79.01 LONG TERM (CURRENT) USE OF ANTICOAGULANTS: Primary | ICD-10-CM

## 2019-06-25 DIAGNOSIS — Z95.2 S/P AVR (AORTIC VALVE REPLACEMENT): ICD-10-CM

## 2019-06-25 LAB — INR PPP: 2.2 (ref 2–3)

## 2019-06-25 PROCEDURE — 93793 PR ANTICOAGULANT MGMT FOR PT TAKING WARFARIN: ICD-10-PCS | Mod: S$GLB,,,

## 2019-06-25 PROCEDURE — 93793 ANTICOAG MGMT PT WARFARIN: CPT | Mod: S$GLB,,,

## 2019-06-25 PROCEDURE — 85610 PROTHROMBIN TIME: CPT | Mod: PBBFAC

## 2019-06-25 NOTE — PROGRESS NOTES
Patient's INR is therapeutic at 2.2.  No upcoming procedures or changes reported.  Dose will be increased slightly in an attempt to keep INR in range. Instructions given for patient to take 15mg on Thursdays and 7.5mg on all other days of the week.  Patient voiced understanding.  Recheck in 1 week.  Please call should you have any questions or concerns at [phone] or [phone].

## 2019-07-01 LAB
ACID FAST MOD KINY STN SPEC: NORMAL
ACID FAST MOD KINY STN SPEC: NORMAL
MYCOBACTERIUM SPEC QL CULT: NORMAL
MYCOBACTERIUM SPEC QL CULT: NORMAL

## 2019-07-02 ENCOUNTER — ANTI-COAG VISIT (OUTPATIENT)
Dept: CARDIOLOGY | Facility: CLINIC | Age: 49
End: 2019-07-02
Payer: MEDICARE

## 2019-07-02 DIAGNOSIS — Z79.01 LONG TERM (CURRENT) USE OF ANTICOAGULANTS: Primary | ICD-10-CM

## 2019-07-02 DIAGNOSIS — Z95.2 S/P AVR (AORTIC VALVE REPLACEMENT): ICD-10-CM

## 2019-07-02 LAB
ACID FAST MOD KINY STN SPEC: NORMAL
INR PPP: 2.3 (ref 2–3)
MYCOBACTERIUM SPEC QL CULT: NORMAL

## 2019-07-02 PROCEDURE — 93793 PR ANTICOAGULANT MGMT FOR PT TAKING WARFARIN: ICD-10-PCS | Mod: ,,,

## 2019-07-02 PROCEDURE — 93793 ANTICOAG MGMT PT WARFARIN: CPT | Mod: ,,,

## 2019-07-02 PROCEDURE — 85610 PROTHROMBIN TIME: CPT | Mod: PBBFAC

## 2019-07-02 NOTE — PROGRESS NOTES
Patient's INR is therapeutic at 2.3.  No upcoming procedures or changes reported.  No changes in dose.  Continue current dose of 15mg on Thursdays and 7.5mg on all other days of the week.  Patient voiced understanding.  Recheck in 2 weeks.  Please call should you have any questions or concerns at [phone] or [phone].

## 2019-07-05 DIAGNOSIS — Z98.890 HISTORY OF RADIOFREQUENCY ABLATION FOR COMPLEX RIGHT ATRIAL ARRHYTHMIA: ICD-10-CM

## 2019-07-05 DIAGNOSIS — N18.6 ESRD (END STAGE RENAL DISEASE): ICD-10-CM

## 2019-07-05 DIAGNOSIS — I50.42 CHRONIC COMBINED SYSTOLIC AND DIASTOLIC HEART FAILURE: ICD-10-CM

## 2019-07-05 DIAGNOSIS — Z95.2 S/P AVR (AORTIC VALVE REPLACEMENT): ICD-10-CM

## 2019-07-05 DIAGNOSIS — I47.19 ATRIAL TACHYCARDIA: ICD-10-CM

## 2019-07-05 DIAGNOSIS — I35.0 NONRHEUMATIC AORTIC VALVE STENOSIS: ICD-10-CM

## 2019-07-05 DIAGNOSIS — I42.8 NONISCHEMIC CARDIOMYOPATHY: ICD-10-CM

## 2019-07-05 DIAGNOSIS — I10 UNCONTROLLED HYPERTENSION: ICD-10-CM

## 2019-07-05 DIAGNOSIS — Q24.9 ADULT CONGENITAL HEART DISEASE: ICD-10-CM

## 2019-07-07 RX ORDER — AMLODIPINE BESYLATE 10 MG/1
TABLET ORAL
Qty: 30 TABLET | Refills: 11 | Status: SHIPPED | OUTPATIENT
Start: 2019-07-07 | End: 2020-08-04

## 2019-07-16 ENCOUNTER — TELEPHONE (OUTPATIENT)
Dept: CARDIOLOGY | Facility: CLINIC | Age: 49
End: 2019-07-16

## 2019-07-25 ENCOUNTER — ANTI-COAG VISIT (OUTPATIENT)
Dept: CARDIOLOGY | Facility: CLINIC | Age: 49
End: 2019-07-25
Payer: MEDICARE

## 2019-07-25 DIAGNOSIS — Z79.01 LONG TERM (CURRENT) USE OF ANTICOAGULANTS: Primary | ICD-10-CM

## 2019-07-25 DIAGNOSIS — Z95.2 S/P AVR (AORTIC VALVE REPLACEMENT): ICD-10-CM

## 2019-07-25 LAB — INR PPP: 1.3 (ref 2–3)

## 2019-07-25 PROCEDURE — 93793 PR ANTICOAGULANT MGMT FOR PT TAKING WARFARIN: ICD-10-PCS | Mod: ,,,

## 2019-07-25 PROCEDURE — 93793 ANTICOAG MGMT PT WARFARIN: CPT | Mod: ,,,

## 2019-07-25 PROCEDURE — 85610 PROTHROMBIN TIME: CPT | Mod: PBBFAC

## 2019-07-25 RX ORDER — WARFARIN 7.5 MG/1
TABLET ORAL
Qty: 35 TABLET | Refills: 1 | Status: SHIPPED | OUTPATIENT
Start: 2019-07-25 | End: 2019-08-27 | Stop reason: SDUPTHER

## 2019-07-25 NOTE — PROGRESS NOTES
Patient's INR is sub-therapeutic at 1.3.  Mr. White states he had a lapse in his insurance coverage, last dose of warfarin taken on last week.  Refill sent on today.  Patient also states he no longer has transportation coverage with insurance. He requested we contact his  to discuss possible transportation -  needs verification of appts.   No abnormal pain, swelling or weakness noted.  Instructions given for patient to take warfarin 22.5mg on today; then resume current dose of 15mg on Thursdays and 7.5mg on all other days of the week.  Patient voiced understanding.  Agreed to next week's appt.  Follow-up on 8/1/19.

## 2019-07-26 NOTE — PROGRESS NOTES
"7/26/19:    Patient's  at [facility] was contacted, Mrs. Mills, per patient request.  Mrs. Mills stated no letter or verification is required.  She is currently working on getting transportation through [place] [facility] set up.     Patient contacted and notified of conversation.   Mr. White states he needs the letter (showing need for frequent monitoring) for another purpose but refuses to state who he wishes the letter to be addressed to.     stated "don't worry about it" and line was disconnected.  Unable to reach patient again, will attempt to discuss matter with patient on his next visit.   "

## 2019-08-06 ENCOUNTER — ANTI-COAG VISIT (OUTPATIENT)
Dept: CARDIOLOGY | Facility: CLINIC | Age: 49
End: 2019-08-06
Payer: MEDICARE

## 2019-08-06 DIAGNOSIS — Z95.2 S/P AVR (AORTIC VALVE REPLACEMENT): ICD-10-CM

## 2019-08-06 DIAGNOSIS — Z79.01 LONG TERM (CURRENT) USE OF ANTICOAGULANTS: Primary | ICD-10-CM

## 2019-08-06 LAB — INR PPP: 2.1 (ref 2–3)

## 2019-08-06 PROCEDURE — 85610 PROTHROMBIN TIME: CPT | Mod: PBBFAC

## 2019-08-06 PROCEDURE — 93793 ANTICOAG MGMT PT WARFARIN: CPT | Mod: ,,,

## 2019-08-06 PROCEDURE — 93793 PR ANTICOAGULANT MGMT FOR PT TAKING WARFARIN: ICD-10-PCS | Mod: ,,,

## 2019-08-06 NOTE — PROGRESS NOTES
Patient's INR is therapeutic at 2.1.  Mr. White is having a colonoscopy on .  Warfarin will be held for 5 days prior and patient will be bridged with heparin 5000 units every 8 hours, 3 days prior and 3 days post procedure. See cardiology note 19.  Instructions given for patient to continue current dose of warfarin 15mg on  and 7.5mg on all other days of the week until procedure.     Heparin Dose= 5000 units every 8 hours  Colonoscopy 19    Pre-Procedure Instructions:  Hold coumadin per calendar (-); resume dose per calendar on 19 UNLESS performing MD advises otherwise.     Start heparin injections the morning of 19  Heparin Dose= 5000 units every 8 hours (Three times Daily)  Last dose of heparin pre-procedure will be the morning of   19 (AM ONLY   *24 hours prior to the procedure)                         Post-Procedure Instructions:     Restart warfarin the evening of  after procedure           At a dose of    22.5mg                         Unless directed otherwise by your physician    Warfarin post-procedure dosin/22:  22.5mg  :  15mg   -:  7.5mg    Restart heparin injections on the morning of      Unless directed otherwise by your physician    Continue heparin injections every 8 hours,  -      *Call the coumadin clinic if your physician has different recommendations post procedure    Procedure instructions provided on dosing calendar.  Brief heparin administration instructions provided. Mr. White states he is familiar with injections but may be requesting assistance from an outside source. Advised patient to contact coumadin clinic if any other education is needed.  Patient repeated all instructions and voiced understanding.       Recheck on 19.  Please call should you have any questions or concerns at [phone] or [phone].

## 2019-08-22 ENCOUNTER — ANESTHESIA EVENT (OUTPATIENT)
Dept: ENDOSCOPY | Facility: HOSPITAL | Age: 49
End: 2019-08-22
Payer: MEDICARE

## 2019-08-22 ENCOUNTER — ANESTHESIA (OUTPATIENT)
Dept: ENDOSCOPY | Facility: HOSPITAL | Age: 49
End: 2019-08-22
Payer: MEDICARE

## 2019-08-22 DIAGNOSIS — Z12.11 COLON CANCER SCREENING: Primary | ICD-10-CM

## 2019-08-22 PROBLEM — Z12.12 ENCOUNTER FOR COLORECTAL CANCER SCREENING: Status: ACTIVE | Noted: 2019-08-22

## 2019-08-22 NOTE — ANESTHESIA PREPROCEDURE EVALUATION
08/22/2019  Isidro White Jr. is a 49 y.o., male.    Anesthesia Evaluation    I have reviewed the Patient Summary Reports.    I have reviewed the Nursing Notes.   I have reviewed the Medications.     Review of Systems  Anesthesia Hx:  No problems with previous Anesthesia  History of prior surgery of interest to airway management or planning: heart surgery. Previous anesthesia: General Denies Family Hx of Anesthesia complications.   Denies Personal Hx of Anesthesia complications.   Social:  Former Smoker    Hematology/Oncology:  Hematology Normal   Oncology Normal     EENT/Dental:EENT/Dental Normal   Cardiovascular:   Exercise tolerance: poor Hypertension, poorly controlled Valvular problems/Murmurs (s/p replacement), AS CHF ECG has been reviewed. Pulmonary hypertension; cardiomyopathy    Echo 5/2019:  CONCLUSIONS     1 - Mild left atrial enlargement.     2 - Concentric hypertrophy.     3 - No wall motion abnormalities.     4 - Normal left ventricular systolic function (EF 60-65%).     5 - Impaired LV relaxation, elevated LAP (grade 2 diastolic dysfunction).     6 - Normal right ventricular systolic function .     7 - Pulmonary hypertension. The estimated PA systolic pressure is greater than 57 mmHg.     8 - Aortic valve prosthesis, JHOANA = 2.01 cm2, AVAi = 0.94 cm2/m2, peak velocity = 3.89 m/s, mean gradient = 31 mmHg.     9 - Mild tricuspid regurgitation.     Normal EF: 60%   Pulmonary:   Denies COPD.  Denies Asthma.    Renal/:   Chronic Renal Disease, Dialysis, ESRD    Hepatic/GI:   Liver Disease, (hepatic fibrosis) Hepatitis, B    Musculoskeletal:  Musculoskeletal Normal    Neurological:  Neurology Normal    Endocrine:  Endocrine Normal    Psych:  Psychiatric Normal           Physical Exam  General:  Malnutrition    Airway/Jaw/Neck:  Airway Findings: Mouth Opening: Normal Tongue: Normal  General Airway  Assessment: Adult  Mallampati: II  Improves to I with phonation.  TM Distance: Normal, at least 6 cm  Jaw/Neck Findings:  Neck ROM: Normal ROM     Eyes/Ears/Nose:  EYES/EARS/NOSE FINDINGS: Normal   Dental:  Dental Findings:    Chest/Lungs:  Chest/Lungs Findings: Normal Respiratory Rate  Decreased breath sounds to right lower lobe.      Heart/Vascular:  Heart Findings: Rate: Normal  Rhythm: Regular Rhythm  Sounds: Normal     Abdomen:  Abdomen Findings:       Mental Status:  Mental Status Findings:  Cooperative, Alert and Oriented         Anesthesia Plan  Type of Anesthesia, risks & benefits discussed:  Anesthesia Type:  MAC  Patient's Preference:   Intra-op Monitoring Plan: standard ASA monitors  Intra-op Monitoring Plan Comments:   Post Op Pain Control Plan:   Post Op Pain Control Plan Comments:   Induction:   IV  Beta Blocker:  Patient is on a Beta-Blocker and has received one dose within the past 24 hours (No further documentation required).       Informed Consent: Patient understands risks and agrees with Anesthesia plan.  Questions answered. Anesthesia consent signed with patient.  ASA Score: 4     Day of Surgery Review of History & Physical: I have interviewed and examined the patient. I have reviewed the patient's H&P dated:  There are no significant changes.          Ready For Surgery From Anesthesia Perspective.

## 2019-08-24 RX ORDER — PANTOPRAZOLE SODIUM 40 MG/1
TABLET, DELAYED RELEASE ORAL
Qty: 30 TABLET | Refills: 9 | Status: SHIPPED | OUTPATIENT
Start: 2019-08-24 | End: 2020-08-04

## 2019-08-26 PROBLEM — Z12.11 COLON CANCER SCREENING: Status: ACTIVE | Noted: 2019-08-26

## 2019-08-27 ENCOUNTER — HOSPITAL ENCOUNTER (OUTPATIENT)
Facility: HOSPITAL | Age: 49
Discharge: HOME | End: 2019-08-27
Attending: FAMILY MEDICINE | Admitting: INTERNAL MEDICINE
Payer: MEDICARE

## 2019-08-27 ENCOUNTER — ANESTHESIA (OUTPATIENT)
Dept: ENDOSCOPY | Facility: HOSPITAL | Age: 49
End: 2019-08-27
Payer: MEDICARE

## 2019-08-27 ENCOUNTER — ANESTHESIA EVENT (OUTPATIENT)
Dept: ENDOSCOPY | Facility: HOSPITAL | Age: 49
End: 2019-08-27
Payer: MEDICARE

## 2019-08-27 DIAGNOSIS — Z12.11 COLON CANCER SCREENING: Primary | ICD-10-CM

## 2019-08-27 DIAGNOSIS — Z12.11 SPECIAL SCREENING FOR MALIGNANT NEOPLASMS, COLON: ICD-10-CM

## 2019-08-27 DIAGNOSIS — Z79.01 LONG TERM (CURRENT) USE OF ANTICOAGULANTS: Primary | ICD-10-CM

## 2019-08-27 LAB
INR PPP: 1 (ref 0.8–1.2)
POTASSIUM SERPL-SCNC: 5.6 MMOL/L (ref 3.5–5.1)
PROTHROMBIN TIME: 11 SEC (ref 9–12.5)

## 2019-08-27 PROCEDURE — 27201089 HC SNARE, DISP (ANY): Mod: NTX | Performed by: INTERNAL MEDICINE

## 2019-08-27 PROCEDURE — 85610 PROTHROMBIN TIME: CPT | Mod: TXP

## 2019-08-27 PROCEDURE — 88305 TISSUE SPECIMEN TO PATHOLOGY - SURGERY: ICD-10-PCS | Mod: 26,NTX,, | Performed by: PATHOLOGY

## 2019-08-27 PROCEDURE — 63600175 PHARM REV CODE 636 W HCPCS: Mod: TXP | Performed by: NURSE ANESTHETIST, CERTIFIED REGISTERED

## 2019-08-27 PROCEDURE — 43239 PR EGD, FLEX, W/BIOPSY, SGL/MULTI: ICD-10-PCS | Mod: TXP,,, | Performed by: INTERNAL MEDICINE

## 2019-08-27 PROCEDURE — 43239 EGD BIOPSY SINGLE/MULTIPLE: CPT | Mod: TXP,,, | Performed by: INTERNAL MEDICINE

## 2019-08-27 PROCEDURE — 25000003 PHARM REV CODE 250: Mod: NTX | Performed by: NURSE ANESTHETIST, CERTIFIED REGISTERED

## 2019-08-27 PROCEDURE — 43239 EGD BIOPSY SINGLE/MULTIPLE: CPT | Mod: NTX | Performed by: INTERNAL MEDICINE

## 2019-08-27 PROCEDURE — 37000009 HC ANESTHESIA EA ADD 15 MINS: Mod: NTX | Performed by: INTERNAL MEDICINE

## 2019-08-27 PROCEDURE — 27201012 HC FORCEPS, HOT/COLD, DISP: Mod: TXP | Performed by: INTERNAL MEDICINE

## 2019-08-27 PROCEDURE — 88305 TISSUE EXAM BY PATHOLOGIST: CPT | Mod: 26,NTX,, | Performed by: PATHOLOGY

## 2019-08-27 PROCEDURE — 63600175 PHARM REV CODE 636 W HCPCS: Mod: TXP | Performed by: FAMILY MEDICINE

## 2019-08-27 PROCEDURE — 88305 TISSUE EXAM BY PATHOLOGIST: CPT | Mod: TXP,59 | Performed by: PATHOLOGY

## 2019-08-27 PROCEDURE — 45385 COLONOSCOPY W/LESION REMOVAL: CPT | Mod: NTX | Performed by: INTERNAL MEDICINE

## 2019-08-27 PROCEDURE — 45385 COLONOSCOPY W/LESION REMOVAL: CPT | Mod: TXP,,, | Performed by: INTERNAL MEDICINE

## 2019-08-27 PROCEDURE — 84132 ASSAY OF SERUM POTASSIUM: CPT | Mod: NTX

## 2019-08-27 PROCEDURE — 45385 PR COLONOSCOPY,REMV LESN,SNARE: ICD-10-PCS | Mod: TXP,,, | Performed by: INTERNAL MEDICINE

## 2019-08-27 PROCEDURE — 37000008 HC ANESTHESIA 1ST 15 MINUTES: Mod: TXP | Performed by: INTERNAL MEDICINE

## 2019-08-27 RX ORDER — SODIUM CHLORIDE 0.9 % (FLUSH) 0.9 %
10 SYRINGE (ML) INJECTION
Status: DISCONTINUED | OUTPATIENT
Start: 2019-08-27 | End: 2019-08-27 | Stop reason: HOSPADM

## 2019-08-27 RX ORDER — PROPOFOL 10 MG/ML
VIAL (ML) INTRAVENOUS
Status: DISCONTINUED | OUTPATIENT
Start: 2019-08-27 | End: 2019-08-27

## 2019-08-27 RX ORDER — WARFARIN 7.5 MG/1
TABLET ORAL
Qty: 35 TABLET | Refills: 1 | Status: SHIPPED | OUTPATIENT
Start: 2019-08-27 | End: 2019-10-10 | Stop reason: SDUPTHER

## 2019-08-27 RX ORDER — SODIUM CHLORIDE 9 MG/ML
INJECTION, SOLUTION INTRAVENOUS CONTINUOUS PRN
Status: DISCONTINUED | OUTPATIENT
Start: 2019-08-27 | End: 2019-08-27

## 2019-08-27 RX ORDER — SODIUM CHLORIDE, SODIUM LACTATE, POTASSIUM CHLORIDE, CALCIUM CHLORIDE 600; 310; 30; 20 MG/100ML; MG/100ML; MG/100ML; MG/100ML
INJECTION, SOLUTION INTRAVENOUS CONTINUOUS
Status: DISCONTINUED | OUTPATIENT
Start: 2019-08-28 | End: 2019-08-27 | Stop reason: HOSPADM

## 2019-08-27 RX ORDER — LIDOCAINE HYDROCHLORIDE 10 MG/ML
INJECTION, SOLUTION EPIDURAL; INFILTRATION; INTRACAUDAL; PERINEURAL
Status: DISCONTINUED | OUTPATIENT
Start: 2019-08-27 | End: 2019-08-27

## 2019-08-27 RX ORDER — HEPARIN SODIUM 5000 [USP'U]/ML
5000 INJECTION, SOLUTION INTRAVENOUS; SUBCUTANEOUS EVERY 8 HOURS
Qty: 9 ML | Refills: 0 | Status: SHIPPED | OUTPATIENT
Start: 2019-08-27 | End: 2019-08-28 | Stop reason: ALTCHOICE

## 2019-08-27 RX ORDER — CALCIUM ACETATE 667 MG/1
667 CAPSULE ORAL 3 TIMES DAILY
Qty: 90 CAPSULE | Refills: 11 | Status: ON HOLD | OUTPATIENT
Start: 2019-08-27 | End: 2020-07-05 | Stop reason: HOSPADM

## 2019-08-27 RX ADMIN — PROPOFOL 50 MG: 10 INJECTION, EMULSION INTRAVENOUS at 03:08

## 2019-08-27 RX ADMIN — PROPOFOL 40 MG: 10 INJECTION, EMULSION INTRAVENOUS at 03:08

## 2019-08-27 RX ADMIN — SODIUM CHLORIDE: 900 INJECTION, SOLUTION INTRAVENOUS at 01:08

## 2019-08-27 RX ADMIN — SODIUM CHLORIDE, SODIUM LACTATE, POTASSIUM CHLORIDE, AND CALCIUM CHLORIDE: 600; 310; 30; 20 INJECTION, SOLUTION INTRAVENOUS at 02:08

## 2019-08-27 RX ADMIN — LIDOCAINE HYDROCHLORIDE 50 MG: 10 INJECTION, SOLUTION EPIDURAL; INFILTRATION; INTRACAUDAL; PERINEURAL at 02:08

## 2019-08-27 RX ADMIN — PROPOFOL 100 MG: 10 INJECTION, EMULSION INTRAVENOUS at 02:08

## 2019-08-27 RX ADMIN — PROPOFOL 100 MG: 10 INJECTION, EMULSION INTRAVENOUS at 03:08

## 2019-08-27 NOTE — PROGRESS NOTES
Colonoscopy rescheduled.  Heparin refilled for 3 day supply.  Warfarin post procedure instructions reviewed.     Warfarin will be restarted tonight, unless performing provider advises otherwise.   8/27/19: 22.5mg  8/28/19: 15mg    Heparin 5000 units Q8H will be resumed on 8/28, unless performing provider advises otherwise.     Recheck INR on 8/29/19.

## 2019-08-27 NOTE — H&P
PRE PROCEDURE H&P    Patient Name: Isidro White Jr.  MRN: [id number]  : 1970  Date of Procedure:  2019  Referring Physician: Addie John MD  Primary Physician: Jacquelyn Skelton MD  Procedure Physician: Addie John MD       Planned Procedure: Colonoscopy and EGD  Diagnosis: variceal screening. Colon cancer screening   Chief Complaint: Same as above    HPI: Patient is an 49 y.o. male is here for the above.     Last colonoscopy: no prior   Family history: negative   Anticoagulation: coumadin, held.  INR pending.     Past Medical History:   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     HD M, W, F    ESRD on dialysis     Hepatitis B     Hyperlipidemia     Hypertension     Secondary hyperparathyroidism of renal origin     Supraventricular tachycardia     atrial tachycardia    Tachycardia     Valvular regurgitation     AI, TR        Past Surgical History:  Past Surgical History:   Procedure Laterality Date    ABLATION N/A 3/13/2017    Performed by Nigel Guallpa MD at [facility] CATH LAB    ASD REPAIR      age 8 [facility]    AV Graft Creation Left 2017    CARDIAC CATHETERIZATION      [facility]     CARDIAC VALVE SURGERY  2017    22 mm Medtronic AV, 28 mm TV Medtronic annuloplaty ring    UOMARBIXM-[id number] Accuseal Left 3/16/2017    Performed by Alcides Manning MD at [facility] OR 2ND FLR    RADIOFREQUENCY ABLATION  2017    Atrial tachycardia    REDO STERNOTOMY WITH AORTIC VALVE REPLACEMENT/redo sternotomy N/A 2017    Performed by Jose Gastelum MD at [facility] OR 2ND FLR    REPAIR-VALVE-TRICUSPID  2017    Performed by Jose Gastelum MD at [facility] OR 2ND FLR    REPLACEMENT-VALVE-AORTIC N/A 2017    Performed by Jose Gastelum MD at  [facility] OR [address] [place] [address]        Home Medications:  Prior to Admission medications    Medication Sig Start Date End Date Taking? Authorizing Provider   amLODIPine (NORVASC) 10 MG tablet TAKE ONE TABLET BY MOUTH EVERY DAY 7/7/19  Yes Gus Hurst MD   ascorbic acid, vitamin C, (VITAMIN C) 500 MG tablet Take 1 tablet (500 mg total) by mouth 2 (two) times daily. 2/21/17  Yes Mary Murguia NP   b complex vitamins tablet Take 1 tablet by mouth once daily.   Yes Historical Provider, MD   calcium acetate (PHOSLO) 667 mg capsule Take 1 capsule (667 mg total) by mouth 3 (three) times daily. 5/3/18  Yes Gus Hurst MD   diltiaZEM (CARDIZEM CD) 360 MG 24 hr capsule Take 1 capsule (360 mg total) by mouth once daily. 1/31/19  Yes Fuad Gudino MD   epoetin vikki (PROCRIT) 10,000 unit/mL injection Inject 1 mL (10,000 Units total) into the skin every Mon, Wed, Fri. To be given with HD 3/20/17  Yes Kika Knox NP   fish oil-omega-3 fatty acids 300-1,000 mg capsule Take 2 g by mouth once daily.   Yes Historical Provider, MD   furosemide (LASIX) 80 MG tablet TAKE ONE TABLET BY MOUTH EVERY DAY  Patient taking differently: TAKE ONE TABLET BY MOUTH EVERY DAY per pt BID 6/18/19 8/31/18  Yes Gus Hurst MD   heparin sodium,porcine (HEPARIN, PORCINE,) 5,000 unit/mL injection Inject 1 mL (5,000 Units total) into the skin every 8 (eight) hours. Take 3 days after while back on coumadin 8/27/19  Yes Fuad Gudino MD   hydrALAZINE (APRESOLINE) 100 MG tablet Take 100 mg by mouth 3 (three) times daily. 5/9/19  Yes Historical Provider, MD   hydrOXYzine HCl (ATARAX) 25 MG tablet TAKE 1 TABLET BY MOUTH DAILY AS NEEDED FOR ITCHING. 4/22/19  Yes Gus Hurst MD   hydrOXYzine pamoate (VISTARIL) 25 MG Cap Take 25 mg by mouth once daily. 4/22/19  Yes Historical Provider, MD   labetalol (NORMODYNE) 100 MG tablet TAKE ONE TABLET BY MOUTH THREE TIMES DAILY AS NEEDED IF HEART RATE GREATER THAN 120 3/25/19  Yes Gus Hurst MD    lisinopril (PRINIVIL,ZESTRIL) 40 MG tablet TAKE ONE TABLET BY MOUTH EVERY DAY 10/2/18  Yes Gus Hurst MD   multivitamin with minerals tablet Take 1 tablet by mouth once daily.   Yes Historical Provider, MD   pantoprazole (PROTONIX) 40 MG tablet TAKE ONE TABLET BY MOUTH EVERY DAY 8/24/19  Yes Gus Hurst MD   ETHAN-RACQUEL RX 1- mg-mg-mcg Tab TAKE ONE TABLET BY MOUTH EVERY DAY 4/3/19  Yes Jarett Ivey MD   tenofovir (VIREAD) 300 mg Tab Take 1 tablet (300 mg total) by mouth once a week. AFTER DIALYSIS 2/22/19  Yes Lina Dominguez NP   vitamin D (VITAMIN D3) 1000 units Tab Take 1,000 Units by mouth 3 (three) times a week. 5/9/19  Yes Historical Provider, MD   zolpidem (AMBIEN) 5 MG Tab TAKE ONE TABLET BY MOUTH EACH EVENING AT BEDTIME AS NEEDED FOR INSOMNIA 1/29/19  Yes Gus Hurst MD   omega-3 fatty acids-vitamin E (FISH OIL) 1,000 mg Cap Take 1 capsule by mouth once daily. 2/21/17 6/18/19  Mary Murguia, ALEX   warfarin (COUMADIN) 7.5 MG tablet Take 2 tablets by mouth on Thursdays and 1 tablet on all other days of the week. 8/27/19   Fuad Gudino MD   heparin sodium,porcine (HEPARIN, PORCINE,) 5,000 unit/mL injection Inject 1 mL (5,000 Units total) into the skin every 8 (eight) hours. Take 3 days before procedure and 3 days after while back on coumadin 6/18/19 8/27/19  Fuad Gudino MD   warfarin (COUMADIN) 7.5 MG tablet Take 2 tablets by mouth on Thursdays and 1 tablet on all other days of the week. 7/25/19 8/27/19  Fuad Gudino MD        Allergies:  Review of patient's allergies indicates:  No Known Allergies     Social History:   Social History     Socioeconomic History    Marital status: Single     Spouse name: Not on file    Number of children: Not on file    Years of education: Not on file    Highest education level: Not on file   Occupational History    Not on file   Social Needs    Financial resource strain: Not on file    Food insecurity:     Worry: Not on file      "Inability: Not on file    Transportation needs:     Medical: Not on file     Non-medical: Not on file   Tobacco Use    Smoking status: Never Smoker    Smokeless tobacco: Never Used   Substance and Sexual Activity    Alcohol use: No     Frequency: Never     Comment: quit in 2016; does have heavy drinking history; started drinking at age 13; was drinking a 12 pack over the weekend and two 24 ounces of beer a day during the week along with a pint of hard alcohol    Drug use: No    Sexual activity: Not on file   Lifestyle    Physical activity:     Days per week: Not on file     Minutes per session: Not on file    Stress: Not on file   Relationships    Social connections:     Talks on phone: Not on file     Gets together: Not on file     Attends Synagogue service: Not on file     Active member of club or organization: Not on file     Attends meetings of clubs or organizations: Not on file     Relationship status: Not on file   Other Topics Concern    Not on file   Social History Narrative    Not on file       Family History:  Family History   Problem Relation Age of Onset    Leukemia Mother     Heart attack Father         massive MI at age 67    No Known Problems Sister     No Known Problems Brother     No Known Problems Sister     No Known Problems Sister     Heart failure Paternal Grandmother     Diabetes Paternal Aunt     Hypertension Paternal Aunt     Leukemia Paternal Aunt         CLL    Suicide Paternal Uncle     Anesthesia problems Paternal Uncle     Diabetes Paternal Aunt     Stroke Paternal Aunt     Valvular heart disease Maternal Aunt     Kidney disease Neg Hx     Colon cancer Neg Hx        ROS: No acute cardiac events, no acute respiratory complaints.     Physical Exam (all patients):    BP (!) 149/90 (BP Location: Left arm, Patient Position: Sitting)   Pulse 78   Temp 98.6 °F (37 °C) (Temporal)   Resp 18   Ht 6' 2" (1.88 m)   Wt 95.4 kg (210 lb 5.1 oz)   SpO2 95%   BMI 27.00 " kg/m²   Lungs: Clear to auscultation bilaterally, respirations unlabored  Heart: Regular rate and rhythm, S1 and S2 normal, no obvious murmurs  Abdomen:         Soft, non-tender, bowel sounds normal, no masses, no organomegaly    Lab Results   Component Value Date    WBC 6.19 05/01/2019     (H) 05/01/2019    RDW 14.1 05/01/2019    PLT 90 (L) 05/01/2019    INR 2.1 08/06/2019    GLU 87 05/21/2019    HGBA1C 4.4 04/29/2019    BUN 55 (H) 05/21/2019     05/21/2019    K 4.1 05/21/2019    CL 98 05/21/2019        SEDATION PLAN: per anesthesia      History reviewed, vital signs satisfactory, cardiopulmonary status satisfactory, sedation options, risks and plans have been discussed with the patient  All their questions were answered and the patient agrees to the sedation procedures as planned and the patient is deemed an appropriate candidate for the sedation as planned.    Procedure explained to patient, informed consent obtained and placed in chart.    Addie John  8/27/2019  2:03 PM

## 2019-08-27 NOTE — TELEPHONE ENCOUNTER
Colonoscopy rescheduled.  Heparin refilled for 3 day supply.  Warfarin post procedure instructions reviewed.     Warfarin will be restarted tonight 8/27/19: 22.5mg  8/28/19: 15mg    Heparin 5000 units Q8H will be resumed on 8/28, unless provider advises otherwise.     Recheck INR on 8/29/19.

## 2019-08-27 NOTE — DISCHARGE SUMMARY
Endoscopy Discharge Summary      Admit Date: 8/27/2019    Discharge Date and Time:  8/27/2019 3:29 PM    Attending Physician: Addie John MD     Discharge Physician: Addie John MD     Principal Admitting Diagnoses: Colon cancer screening         Discharge Diagnosis: The primary encounter diagnosis was Colon cancer screening. A diagnosis of Special screening for malignant neoplasms, colon was also pertinent to this visit.     Discharged Condition: Good    Indication for Admission: Colon cancer screening     Hospital Course: Patient was admitted for an inpatient procedure and tolerated the procedure well with no complications.    Significant Diagnostic Studies: Colonoscopy with polypectomy and EGD with biopsy    Pathology (if any):  Specimen (12h ago, onward)    Start     Ordered    08/27/19 1459  Specimen to Pathology - Surgery  Once     Comments:  1.  gastric bx, gastritis, r/o h. Pylori2.  Cecum polyp     Start Status     08/27/19 1459 Collected (08/27/19 1521) Order ID: [id number]       08/27/19 1521          Estimated Blood Loss: 1 ml.    Discussed with: patient.    Disposition: Home.    Follow Up/Patient Instructions:   Current Discharge Medication List      CONTINUE these medications which have NOT CHANGED    Details   amLODIPine (NORVASC) 10 MG tablet TAKE ONE TABLET BY MOUTH EVERY DAY  Qty: 30 tablet, Refills: 11    Comments: Generic For:NORVASC 10 MG TABLET  Associated Diagnoses: Chronic combined systolic and diastolic heart failure; Atrial tachycardia; Adult congenital heart disease; Nonischemic cardiomyopathy; Nonrheumatic aortic valve stenosis; Uncontrolled hypertension; ESRD (end stage renal disease); S/P AVR (aortic valve replacement); History of radiofrequency ablation for complex right atrial arrhythmia      ascorbic acid, vitamin C, (VITAMIN C) 500 MG tablet Take 1 tablet (500 mg total) by mouth 2 (two) times daily.      b complex vitamins tablet Take 1 tablet by mouth once daily.       diltiaZEM (CARDIZEM CD) 360 MG 24 hr capsule Take 1 capsule (360 mg total) by mouth once daily.  Qty: 90 capsule, Refills: 1    Associated Diagnoses: Essential hypertension; PVC's (premature ventricular contractions)      epoetin vikki (PROCRIT) 10,000 unit/mL injection Inject 1 mL (10,000 Units total) into the skin every Mon, Wed, Fri. To be given with HD      fish oil-omega-3 fatty acids 300-1,000 mg capsule Take 2 g by mouth once daily.      furosemide (LASIX) 80 MG tablet TAKE ONE TABLET BY MOUTH EVERY DAY  Qty: 30 tablet, Refills: 9    Comments: Generic For:LASIX 80 MG TABLET      heparin sodium,porcine (HEPARIN, PORCINE,) 5,000 unit/mL injection Inject 1 mL (5,000 Units total) into the skin every 8 (eight) hours. Take 3 days after while back on coumadin  Qty: 9 mL, Refills: 0    Associated Diagnoses: Long term (current) use of anticoagulants      hydrALAZINE (APRESOLINE) 100 MG tablet Take 100 mg by mouth 3 (three) times daily.      hydrOXYzine HCl (ATARAX) 25 MG tablet TAKE 1 TABLET BY MOUTH DAILY AS NEEDED FOR ITCHING.  Qty: 30 tablet, Refills: 8    Comments: N O T I C E    PRESCRIPTION PREVIOUSLY AUTHORIZED BY DOCTOR:BISMARK FELIX [phone]      hydrOXYzine pamoate (VISTARIL) 25 MG Cap Take 25 mg by mouth once daily.      labetalol (NORMODYNE) 100 MG tablet TAKE ONE TABLET BY MOUTH THREE TIMES DAILY AS NEEDED IF HEART RATE GREATER THAN 120  Qty: 90 tablet, Refills: 3    Comments: 03/25/2019 12:52:30 PM      lisinopril (PRINIVIL,ZESTRIL) 40 MG tablet TAKE ONE TABLET BY MOUTH EVERY DAY  Qty: 30 tablet, Refills: 9    Comments: Generic For:ZESTRIL 40 MG TABLET      multivitamin with minerals tablet Take 1 tablet by mouth once daily.      pantoprazole (PROTONIX) 40 MG tablet TAKE ONE TABLET BY MOUTH EVERY DAY  Qty: 30 tablet, Refills: 9    Comments: Generic For:*PROTONIX 40 MG TABLET EC      ETHAN-RACQUEL RX 1- mg-mg-mcg Tab TAKE ONE TABLET BY MOUTH EVERY DAY  Qty: 90 tablet, Refills: 2    Comments:  Generic For:NEPHRO-RACQUEL RX TABLET      tenofovir (VIREAD) 300 mg Tab Take 1 tablet (300 mg total) by mouth once a week. AFTER DIALYSIS  Qty: 4 tablet, Refills: 12    Associated Diagnoses: Chronic viral hepatitis B without delta agent and without coma      vitamin D (VITAMIN D3) 1000 units Tab Take 1,000 Units by mouth 3 (three) times a week.      zolpidem (AMBIEN) 5 MG Tab TAKE ONE TABLET BY MOUTH EACH EVENING AT BEDTIME AS NEEDED FOR INSOMNIA  Qty: 30 tablet, Refills: 4    Comments: Generic For:AMBIEN 5 MG TABLET  01/29/2019 10:02:39 AM      calcium acetate (PHOSLO) 667 mg capsule Take 1 capsule (667 mg total) by mouth 3 (three) times daily.  Qty: 90 capsule, Refills: 11      omega-3 fatty acids-vitamin E (FISH OIL) 1,000 mg Cap Take 1 capsule by mouth once daily.  Refills: 0      warfarin (COUMADIN) 7.5 MG tablet Take 2 tablets by mouth on Thursdays and 1 tablet on all other days of the week.  Qty: 35 tablet, Refills: 1    Associated Diagnoses: Long term (current) use of anticoagulants             Discharge Procedure Orders   Diet general     Call MD for:  temperature >100.4     Call MD for:  persistent nausea and vomiting     Call MD for:  severe uncontrolled pain     Call MD for:  difficulty breathing, headache or visual disturbances     Activity as tolerated       Follow-up Information     Addie John MD. Call in 1 week.    Specialty:  Gastroenterology  Why:  For pathology results  Contact information:  [address]  [place] [address] [phone]

## 2019-08-27 NOTE — ANESTHESIA PREPROCEDURE EVALUATION
08/27/2019  Isidro White Jr. is a 49 y.o., male.    Anesthesia Evaluation    I have reviewed the Patient Summary Reports.    I have reviewed the Nursing Notes.   I have reviewed the Medications.     Review of Systems  Anesthesia Hx:  No problems with previous Anesthesia  History of prior surgery of interest to airway management or planning: heart surgery. Previous anesthesia: General Denies Family Hx of Anesthesia complications.   Denies Personal Hx of Anesthesia complications.   Social:  Former Smoker    Hematology/Oncology:  Hematology Normal   Oncology Normal     EENT/Dental:EENT/Dental Normal   Cardiovascular:   Exercise tolerance: poor Hypertension, poorly controlled Valvular problems/Murmurs (s/p replacement), AS CHF ECG has been reviewed. Pulmonary hypertension; cardiomyopathy    Echo 5/2019:  CONCLUSIONS     1 - Mild left atrial enlargement.     2 - Concentric hypertrophy.     3 - No wall motion abnormalities.     4 - Normal left ventricular systolic function (EF 60-65%).     5 - Impaired LV relaxation, elevated LAP (grade 2 diastolic dysfunction).     6 - Normal right ventricular systolic function .     7 - Pulmonary hypertension. The estimated PA systolic pressure is greater than 57 mmHg.     8 - Aortic valve prosthesis, JHOANA = 2.01 cm2, AVAi = 0.94 cm2/m2, peak velocity = 3.89 m/s, mean gradient = 31 mmHg.     9 - Mild tricuspid regurgitation.     Normal EF: 60%   Pulmonary:   Denies COPD.  Denies Asthma.    Renal/:   Chronic Renal Disease, Dialysis, ESRD    Hepatic/GI:   Liver Disease, (hepatic fibrosis) Hepatitis, B    Musculoskeletal:  Musculoskeletal Normal    Neurological:  Neurology Normal    Endocrine:  Endocrine Normal    Psych:  Psychiatric Normal           Physical Exam   Airway/Jaw/Neck:  Airway Findings: Mouth Opening: Normal Tongue: Normal  General Airway Assessment: Adult   Mallampati: II  TM Distance: 4 - 6 cm                 Anesthesia Plan  Type of Anesthesia, risks & benefits discussed:  Anesthesia Type:  MAC  Patient's Preference:   Intra-op Monitoring Plan:   Intra-op Monitoring Plan Comments:   Post Op Pain Control Plan:   Post Op Pain Control Plan Comments:   Induction:   IV  Beta Blocker:  Patient is on a Beta-Blocker and has received one dose within the past 24 hours (No further documentation required).       Informed Consent: Patient understands risks and agrees with Anesthesia plan.  Questions answered. Anesthesia consent signed with patient.  ASA Score: 4     Day of Surgery Review of History & Physical: I have interviewed and examined the patient. I have reviewed the patient's H&P dated:            Ready For Surgery From Anesthesia Perspective.

## 2019-08-27 NOTE — TRANSFER OF CARE
"Anesthesia Transfer of Care Note    Patient: Isidro White Jr.    Procedure(s) Performed: Procedure(s) (LRB):  COLONOSCOPY (N/A)  EGD (ESOPHAGOGASTRODUODENOSCOPY) (N/A)    Patient location: GI    Anesthesia Type: MAC    Transport from OR: Transported from OR on room air with adequate spontaneous ventilation    Post pain: adequate analgesia    Post assessment: no apparent anesthetic complications and tolerated procedure well    Post vital signs: stable    Level of consciousness: awake, alert and oriented    Nausea/Vomiting: no nausea/vomiting    Complications: none    Transfer of care protocol was followed      Last vitals:   Visit Vitals  BP (!) 149/90 (BP Location: Left arm, Patient Position: Sitting)   Pulse 78   Temp 37 °C (98.6 °F) (Temporal)   Resp 18   Ht 6' 2" (1.88 m)   Wt 95.4 kg (210 lb 5.1 oz)   SpO2 95%   BMI 27.00 kg/m²     "

## 2019-08-27 NOTE — PROVATION PATIENT INSTRUCTIONS
Discharge Summary/Instructions after an Endoscopic Procedure  Patient Name: Isidro White  Patient MRN: [id number]  Patient YOB: 1970 Tuesday, August 27, 2019 Addie John MD  RESTRICTIONS:  During your procedure today, you received medications for sedation.  These   medications may affect your judgment, balance and coordination.  Therefore,   for 24 hours, you have the following restrictions:   - DO NOT drive a car, operate machinery, make legal/financial decisions,   sign important papers or drink alcohol.    ACTIVITY:  Today: no heavy lifting, straining or running due to procedural   sedation/anesthesia.  The following day: return to full activity including work.  DIET:  Eat and drink normally unless instructed otherwise.     TREATMENT FOR COMMON SIDE EFFECTS:  - Mild abdominal pain, nausea, belching, bloating or excessive gas:  rest,   eat lightly and use a heating pad.  - Sore Throat: treat with throat lozenges and/or gargle with warm salt   water.  - Because air was used during the procedure, expelling large amounts of air   from your rectum or belching is normal.  - If a bowel prep was taken, you may not have a bowel movement for 1-3 days.    This is normal.  SYMPTOMS TO WATCH FOR AND REPORT TO YOUR PHYSICIAN:  1. Abdominal pain or bloating, other than gas cramps.  2. Chest pain.  3. Back pain.  4. Signs of infection such as: chills or fever occurring within 24 hours   after the procedure.  5. Rectal bleeding, which would show as bright red, maroon, or black stools.   (A tablespoon of blood from the rectum is not serious, especially if   hemorrhoids are present.)  6. Vomiting.  7. Weakness or dizziness.  GO DIRECTLY TO THE NEAREST EMERGENCY ROOM IF YOU HAVE ANY OF THE FOLLOWING:      Difficulty breathing              Chills and/or fever over 101 F   Persistent vomiting and/or vomiting blood   Severe abdominal pain   Severe chest pain   Black, tarry stools   Bleeding- more than one  tablespoon   Any other symptom or condition that you feel may need urgent attention  Your doctor recommends these additional instructions:  If any biopsies were taken, your doctors clinic will contact you in 1 to 2   weeks with any results.  - Discharge patient to home (via wheelchair).   - Resume previous diet today.   - Patient has a contact number available for emergencies.  The signs and   symptoms of potential delayed complications were discussed with the   patient.  Return to normal activities tomorrow.  Written discharge   instructions were provided to the patient.   - Await pathology results.   - Repeat colonoscopy in 6 months because the bowel preparation was   suboptimal.   - Continue present medications.  For questions, problems or results please call your physician Addie John MD at Work:  [phone]  If you have any questions about the above instructions, call the GI   department at [phone] or call the endoscopy unit at [phone]   from 7am until 3 pm.  [facility] [place], EMERGENCY ROOM PHONE NUMBER:   [phone]  IF A COMPLICATION OR EMERGENCY SITUATION ARISES AND YOU ARE UNABLE TO REACH   YOUR PHYSICIAN - GO DIRECTLY TO THE EMERGENCY ROOM.  I have read or have had read to me these discharge instructions for my   procedure and have received a written copy.  I understand these   instructions and will follow-up with my physician if I have any questions.     __________________________________       _____________________________________  Nurse Signature                                          Patient/Designated   Responsible Party Signature  MD Addie Ceballos MD  8/27/2019 3:24:41 PM  This report has been verified and signed electronically.  [employer]ATION

## 2019-08-27 NOTE — ANESTHESIA POSTPROCEDURE EVALUATION
Anesthesia Post Evaluation    Patient: Isidro White JrKatya    Procedure(s) Performed: Procedure(s) (LRB):  COLONOSCOPY (N/A)  EGD (ESOPHAGOGASTRODUODENOSCOPY) (N/A)    Final Anesthesia Type: MAC  Patient location during evaluation: GI PACU  Patient participation: Yes- Able to Participate  Level of consciousness: awake and alert and oriented  Post-procedure vital signs: reviewed and stable  Pain management: adequate  Airway patency: patent  PONV status at discharge: No PONV  Anesthetic complications: no      Cardiovascular status: hemodynamically stable  Respiratory status: unassisted, spontaneous ventilation and room air  Hydration status: euvolemic  Follow-up not needed.          Vitals Value Taken Time   /61 8/27/2019  3:25 PM   Temp 37 °C (98.6 °F) 8/27/2019  1:46 PM   Pulse 80 8/27/2019  3:25 PM   Resp 20 8/27/2019  3:25 PM   SpO2 95 % 8/27/2019  3:25 PM         No case tracking events are documented in the log.      Pain/Dru Score: No data recorded

## 2019-08-27 NOTE — ANESTHESIA RELEASE NOTE
"Anesthesia Release from PACU Note    Patient: Isidro White Jr.    Procedure(s) Performed: Procedure(s) (LRB):  COLONOSCOPY (N/A)  EGD (ESOPHAGOGASTRODUODENOSCOPY) (N/A)    Anesthesia type: MAC    Post pain: Adequate analgesia    Post assessment: no apparent anesthetic complications and tolerated procedure well    Last Vitals:   Visit Vitals  BP (!) 149/90 (BP Location: Left arm, Patient Position: Sitting)   Pulse 78   Temp 37 °C (98.6 °F) (Temporal)   Resp 18   Ht 6' 2" (1.88 m)   Wt 95.4 kg (210 lb 5.1 oz)   SpO2 95%   BMI 27.00 kg/m²       Post vital signs: stable    Level of consciousness: awake, alert  and oriented    Nausea/Vomiting: no nausea/no vomiting    Complications: none    Airway Patency: patent    Respiratory: unassisted, spontaneous ventilation, room air    Cardiovascular: stable and blood pressure at baseline    Hydration: euvolemic  "

## 2019-08-27 NOTE — DISCHARGE INSTRUCTIONS
Understanding Colon and Rectal Polyps    The colon (also called the large intestine) is a muscular tube that forms the last part of the digestive tract. It absorbs water and stores food waste. The colon is about 4 to 6 feet long. The rectum is the last 6 inches of the colon. The colon and rectum have a smooth lining composed of millions of cells. Changes in these cells can lead to growths in the colon that can become cancerous and should be removed. Multiple tests are available to screen for colon cancer, but the colonoscopy is the most recommended test. During colonoscopy, these polyps can be removed. How often you need this test depends on many things including your condition, your family history, symptoms, and what the findings were at the previous colonoscopy.   When the colon lining changes  Changes that happen in the cells that line the colon or rectum can lead to growths called polyps. Over a period of years, polyps can turn cancerous. Removing polyps early may prevent cancer from ever forming.  Polyps  Polyps are fleshy clumps of tissue that form on the lining of the colon or rectum. Small polyps are usually benign (not cancerous). However, over time, cells in a polyp can change and become cancerous. Certain types of polyps known as adenomatous polyps are premalignant. The risk for invasive cancer increases with the size of the polyp and certain cell and gene features. This means that they can become cancerous if they're not removed. Hyperplastic polyps are benign. They can grow quite large and not turn cancerous.   Cancer  Almost all colorectal cancers start when polyp cells begin growing abnormally. As a cancerous tumor grows, it may involve more and more of the colon or rectum. In time, cancer can also grow beyond the colon or rectum and spread to nearby organs or to glands called lymph nodes. The cells can also travel to other parts of the body. This is known as metastasis. The earlier a cancerous  tumor is removed, the better the chance of preventing its spread.    Date Last Reviewed: 8/1/2016  © 8706-9871 [employer]. [address] [place] [address], [place] [address]. All rights reserved. This information is not intended as a substitute for professional medical care. Always follow your healthcare professional's instructions.        Gastritis (Adult)    Gastritis is inflammation and irritation of the stomach lining. It can be present for a short time (acute) or be long lasting (chronic). Gastritis is often caused by infection with bacteria called H pylori. More than a third of people in the [place] have this bacteria in their bodies. In many cases, H pylori causes no problems or symptoms. In some people, though, the infection irritates the stomach lining and causes gastritis. Other causes of stomach irritation include drinking alcohol or taking pain-relieving medicines called NSAIDs (such as aspirin or ibuprofen).   Symptoms of gastritis can include:  · Abdominal pain or bloating  · Loss of appetite  · Nausea or vomiting  · Vomiting blood or having black stools  · Feeling more tired than usual  An inflamed and irritated stomach lining is more likely to develop a sore called an ulcer. To help prevent this, gastritis should be treated.  Home care  If needed, medicines may be prescribed. If you have H pylori infection, treating it will likely relieve your symptoms. Other changes can help reduce stomach irritation and help it heal.  · If you have been prescribed medicines for H pylori infection, take them as directed. Take all of the medicine until it is finished or your healthcare provider tells you to stop, even if you feel better.  · Your healthcare provider may recommend avoiding NSAIDs. If you take daily aspirin for your heart or other medical reasons, do not stop without talking to your healthcare provider first.  · Avoid drinking alcohol.  · Stop smoking. Smoking can irritate the stomach and delay  healing. As much as possible, stay away from second hand smoke.  Follow-up care  Follow up with your healthcare provider, or as advised by our staff. Testing may be needed to check for inflammation or an ulcer.  When to seek medical advice  Call your healthcare provider for any of the following:  · Stomach pain that gets worse or moves to the lower right abdomen (appendix area)  · Chest pain that appears or gets worse, or spreads to the back, neck, shoulder, or arm  · Frequent vomiting (cant keep down liquids)  · Blood in the stool or vomit (red or black in color)  · Feeling weak or dizzy  · Fever of 100.4ºF (38ºC) or higher, or as directed by your healthcare provider  Date Last Reviewed: 6/22/2015 © 2000-2017 [employer]. [address] [place] [address], [place] [address]. All rights reserved. This information is not intended as a substitute for professional medical care. Always follow your healthcare professional's instructions.

## 2019-08-27 NOTE — PROVATION PATIENT INSTRUCTIONS
Discharge Summary/Instructions after an Endoscopic Procedure  Patient Name: Isidro White  Patient MRN: [id number]  Patient YOB: 1970 Tuesday, August 27, 2019 Addie John MD  RESTRICTIONS:  During your procedure today, you received medications for sedation.  These   medications may affect your judgment, balance and coordination.  Therefore,   for 24 hours, you have the following restrictions:   - DO NOT drive a car, operate machinery, make legal/financial decisions,   sign important papers or drink alcohol.    ACTIVITY:  Today: no heavy lifting, straining or running due to procedural   sedation/anesthesia.  The following day: return to full activity including work.  DIET:  Eat and drink normally unless instructed otherwise.     TREATMENT FOR COMMON SIDE EFFECTS:  - Mild abdominal pain, nausea, belching, bloating or excessive gas:  rest,   eat lightly and use a heating pad.  - Sore Throat: treat with throat lozenges and/or gargle with warm salt   water.  - Because air was used during the procedure, expelling large amounts of air   from your rectum or belching is normal.  - If a bowel prep was taken, you may not have a bowel movement for 1-3 days.    This is normal.  SYMPTOMS TO WATCH FOR AND REPORT TO YOUR PHYSICIAN:  1. Abdominal pain or bloating, other than gas cramps.  2. Chest pain.  3. Back pain.  4. Signs of infection such as: chills or fever occurring within 24 hours   after the procedure.  5. Rectal bleeding, which would show as bright red, maroon, or black stools.   (A tablespoon of blood from the rectum is not serious, especially if   hemorrhoids are present.)  6. Vomiting.  7. Weakness or dizziness.  GO DIRECTLY TO THE NEAREST EMERGENCY ROOM IF YOU HAVE ANY OF THE FOLLOWING:      Difficulty breathing              Chills and/or fever over 101 F   Persistent vomiting and/or vomiting blood   Severe abdominal pain   Severe chest pain   Black, tarry stools   Bleeding- more than one  tablespoon   Any other symptom or condition that you feel may need urgent attention  Your doctor recommends these additional instructions:  If any biopsies were taken, your doctors clinic will contact you in 1 to 2   weeks with any results.  - Patient has a contact number available for emergencies.  The signs and   symptoms of potential delayed complications were discussed with the   patient.  Return to normal activities tomorrow.  Written discharge   instructions were provided to the patient.   - Discharge patient to home (via wheelchair).   - Resume previous diet today.   - Continue present medications.   - Await pathology results.  For questions, problems or results please call your physician Addie John MD at Work:  [phone]  If you have any questions about the above instructions, call the GI   department at [phone] or call the endoscopy unit at [phone]   from 7am until 3 pm.  [facility], EMERGENCY ROOM PHONE NUMBER:   [phone]  IF A COMPLICATION OR EMERGENCY SITUATION ARISES AND YOU ARE UNABLE TO REACH   YOUR PHYSICIAN - GO DIRECTLY TO THE EMERGENCY ROOM.  I have read or have had read to me these discharge instructions for my   procedure and have received a written copy.  I understand these   instructions and will follow-up with my physician if I have any questions.     __________________________________       _____________________________________  Nurse Signature                                          Patient/Designated   Responsible Party Signature  MD Addie Ceballos MD  8/27/2019 3:27:37 PM  This report has been verified and signed electronically.  [employer]ATION

## 2019-08-28 ENCOUNTER — TELEPHONE (OUTPATIENT)
Dept: PULMONOLOGY | Facility: CLINIC | Age: 49
End: 2019-08-28

## 2019-08-28 VITALS
OXYGEN SATURATION: 97 % | TEMPERATURE: 99 F | BODY MASS INDEX: 26.99 KG/M2 | HEART RATE: 76 BPM | SYSTOLIC BLOOD PRESSURE: 122 MMHG | DIASTOLIC BLOOD PRESSURE: 71 MMHG | HEIGHT: 74 IN | RESPIRATION RATE: 21 BRPM | WEIGHT: 210.31 LBS

## 2019-08-28 DIAGNOSIS — Z79.01 LONG TERM (CURRENT) USE OF ANTICOAGULANTS: Primary | ICD-10-CM

## 2019-08-28 RX ORDER — ENOXAPARIN SODIUM 100 MG/ML
90 INJECTION SUBCUTANEOUS DAILY
Qty: 5 ML | Refills: 0 | Status: ON HOLD | OUTPATIENT
Start: 2019-08-28 | End: 2020-07-05 | Stop reason: HOSPADM

## 2019-08-28 NOTE — PROGRESS NOTES
Patient contacted coumadin clinic. Heparin was not approved by new insurance.  After consulting with Dr. Gudino, patient will be bridged with Lovenox 1mg/kg daily (renal dose) injections.        Administration instructions reviewed with patient.

## 2019-08-28 NOTE — TELEPHONE ENCOUNTER
----- Message from Dayana Burdick sent at 8/28/2019  2:15 PM CDT -----  Contact: patient  Patient called to speak with your office in regard to why this appointment was scheduled and what it is for.    Please call [phone] to discuss today.

## 2019-08-29 ENCOUNTER — ANTI-COAG VISIT (OUTPATIENT)
Dept: CARDIOLOGY | Facility: CLINIC | Age: 49
End: 2019-08-29
Payer: MEDICARE

## 2019-08-29 DIAGNOSIS — I48.91 ATRIAL FIBRILLATION, UNSPECIFIED TYPE: ICD-10-CM

## 2019-08-29 DIAGNOSIS — Z79.01 LONG TERM (CURRENT) USE OF ANTICOAGULANTS: Primary | ICD-10-CM

## 2019-08-29 LAB — INR PPP: 1.2 (ref 2–3)

## 2019-08-29 PROCEDURE — 85610 PROTHROMBIN TIME: CPT | Mod: PBBFAC

## 2019-08-29 PROCEDURE — 93793 PR ANTICOAGULANT MGMT FOR PT TAKING WARFARIN: ICD-10-PCS | Mod: ,,,

## 2019-08-29 PROCEDURE — 93793 ANTICOAG MGMT PT WARFARIN: CPT | Mod: ,,,

## 2019-08-29 NOTE — PROGRESS NOTES
Patient's INR is sub-therapeutic at 1.2 post colonoscopy.  Warfarin was resumed on 8/27/19.  Instructions given for patient to take warfarin 22.5mg on today and 15mg on tomorrow; then resume current dose of 15mg on Thursdays and 7.5mg on all other days of the week.  Lovenox once daily injections will be continued until Sunday, 9/1/19.   Patient voiced understanding.  Follow-up on 9/3/19.

## 2019-09-03 ENCOUNTER — CLINICAL SUPPORT (OUTPATIENT)
Dept: PULMONOLOGY | Facility: CLINIC | Age: 49
End: 2019-09-03
Payer: MEDICARE

## 2019-09-03 ENCOUNTER — ANTI-COAG VISIT (OUTPATIENT)
Dept: CARDIOLOGY | Facility: CLINIC | Age: 49
End: 2019-09-03
Payer: MEDICARE

## 2019-09-03 ENCOUNTER — OFFICE VISIT (OUTPATIENT)
Dept: PULMONOLOGY | Facility: CLINIC | Age: 49
End: 2019-09-03
Payer: MEDICARE

## 2019-09-03 VITALS
SYSTOLIC BLOOD PRESSURE: 138 MMHG | DIASTOLIC BLOOD PRESSURE: 80 MMHG | HEIGHT: 74 IN | HEART RATE: 81 BPM | BODY MASS INDEX: 27.98 KG/M2 | RESPIRATION RATE: 18 BRPM | WEIGHT: 218.06 LBS | OXYGEN SATURATION: 96 %

## 2019-09-03 DIAGNOSIS — R76.11 HISTORY OF POSITIVE PPD, TREATMENT STATUS UNKNOWN: ICD-10-CM

## 2019-09-03 DIAGNOSIS — R76.8 HISTONE ANTIBODY POSITIVE: ICD-10-CM

## 2019-09-03 DIAGNOSIS — I11.0 HYPERTENSIVE CARDIOMEGALY WITH HEART FAILURE: ICD-10-CM

## 2019-09-03 DIAGNOSIS — G47.33 OSA (OBSTRUCTIVE SLEEP APNEA): ICD-10-CM

## 2019-09-03 DIAGNOSIS — I27.20 PULMONARY HTN: Primary | ICD-10-CM

## 2019-09-03 DIAGNOSIS — I50.32 CHRONIC DIASTOLIC HEART FAILURE: ICD-10-CM

## 2019-09-03 DIAGNOSIS — Z79.01 LONG TERM (CURRENT) USE OF ANTICOAGULANTS: Primary | ICD-10-CM

## 2019-09-03 DIAGNOSIS — R91.8 BILATERAL PULMONARY INFILTRATES ON CXR: ICD-10-CM

## 2019-09-03 DIAGNOSIS — I27.9 CHRONIC PULMONARY HEART DISEASE: ICD-10-CM

## 2019-09-03 DIAGNOSIS — Z95.2 S/P AVR (AORTIC VALVE REPLACEMENT): ICD-10-CM

## 2019-09-03 DIAGNOSIS — I27.20 PULMONARY HTN: ICD-10-CM

## 2019-09-03 DIAGNOSIS — I10 ESSENTIAL HYPERTENSION: ICD-10-CM

## 2019-09-03 LAB — INR PPP: 4.4 (ref 2–3)

## 2019-09-03 PROCEDURE — 99999 PR PBB SHADOW E&M-EST. PATIENT-LVL I: ICD-10-PCS | Mod: PBBFAC,TXP,,

## 2019-09-03 PROCEDURE — 93793 ANTICOAG MGMT PT WARFARIN: CPT | Mod: ,,,

## 2019-09-03 PROCEDURE — 99999 PR PBB SHADOW E&M-EST. PATIENT-LVL IV: CPT | Mod: PBBFAC,TXP,, | Performed by: INTERNAL MEDICINE

## 2019-09-03 PROCEDURE — 93793 PR ANTICOAGULANT MGMT FOR PT TAKING WARFARIN: ICD-10-PCS | Mod: ,,,

## 2019-09-03 PROCEDURE — 99214 OFFICE O/P EST MOD 30 MIN: CPT | Mod: S$PBB,NTX,, | Performed by: INTERNAL MEDICINE

## 2019-09-03 PROCEDURE — 99999 PR PBB SHADOW E&M-EST. PATIENT-LVL IV: ICD-10-PCS | Mod: PBBFAC,TXP,, | Performed by: INTERNAL MEDICINE

## 2019-09-03 PROCEDURE — 85610 PROTHROMBIN TIME: CPT | Mod: PBBFAC

## 2019-09-03 PROCEDURE — 94762 N-INVAS EAR/PLS OXIMTRY CONT: CPT | Mod: PBBFAC,NTX

## 2019-09-03 PROCEDURE — 99214 OFFICE O/P EST MOD 30 MIN: CPT | Mod: PBBFAC,27,TXP | Performed by: INTERNAL MEDICINE

## 2019-09-03 PROCEDURE — 99999 PR PBB SHADOW E&M-EST. PATIENT-LVL I: CPT | Mod: PBBFAC,TXP,,

## 2019-09-03 PROCEDURE — 99214 PR OFFICE/OUTPT VISIT, EST, LEVL IV, 30-39 MIN: ICD-10-PCS | Mod: S$PBB,NTX,, | Performed by: INTERNAL MEDICINE

## 2019-09-03 PROCEDURE — 99211 OFF/OP EST MAY X REQ PHY/QHP: CPT | Mod: PBBFAC,TXP

## 2019-09-03 NOTE — PATIENT INSTRUCTIONS
Your provider has scheduled you for a sleep study.   You should be receiving a phone call from the sleep lab shortly after your study has been approved by your insurance. Please make sure you have your [facility] numbers [facility] If you do not hear from anyone in the next 10 business days, please call the sleep lab at [phone] to schedule your sleep study. The sleep studies are performed at [facility] seven nights a week.  When you are scheduling your sleep study, they will also make you a follow up appointment with your provider. This follow up appointment will be 10-14 days after your sleep study to review the results. If it is noted that you do have sleep apnea on your initial sleep study, you may receive a call back for a second night study with the CPAP before you come back to the office.

## 2019-09-03 NOTE — PROGRESS NOTES
Patient's INR is supra therapeutic at 4.4.  Patient has taken medication as previously instructed on last INR visit.  Lovenox once daily injections was completed on Sunday, 9/01/2019.   No signs of any abnormal bleeding reported.  Instructed patient to hold Warfarin dose today - only, then on tomorrow (9/04/2019), resume previous dose of Warfarin 15 mg every Thursday and 7.5 mg on all other days per week.  Recheck on Tuesday, 9/10/2019.  Advised to seek medical attention (ED) for any signs of abnormal bleeding, if needed.  Dose calendar given and reviewed with patient.  Patient repeated back instructions and voiced understanding.

## 2019-09-04 NOTE — PROCEDURES
[facility]  [address] * [place] [address]  Telephone: [phone]  Test date: 19 Start: 19 22:41:53 Isidro White  Doctor: Dr. Urbina End: 19 06:02:41 [id number]  Oximetry: Summary Report  Comments: Room Air  Recording time: 07:20:48 Highest pulse: 98 Highest SpO2: 98%  Excluded samplin:00:20 Lowest pulse: 66 Lowest SpO2: 84%  Total valid samplin:20:28 Mean pulse: 77 Mean SpO2: 90.7%  1 S.D.: 3.8 1 S.D.: 1.8  Time with SpO2<90: 2:03:36, 28.1%  Time with SpO2<80: 0:00:00, 0.0%  Time with SpO2<70: 0:00:00, 0.0%  Time with SpO2<60: 0:00:00, 0.0%  Time with SpO2<89: 0:56:12, 12.8%  Time with SpO2 =>90: 5:16:52, 71.9%  Time with SpO2=>80 & <90: 2:03:36, 28.1%  Time with SpO2=>70 & <80: 0:00:00, 0.0%  Time with SpO2=>60 & <70: 0:00:00, 0.0%  The longest continuous time with saturation <=88 was 00:03:56, which started at  19 00:37:53.  A desaturation event was defined as a decrease of saturation by 4 or more.  No events were excluded due to artifact.  There were 12 desaturation events over 3 minutes duration.  There were 15 desaturation events of less than 3 minutes duration during which:  The mean high was 94.1%. The mean low was 87.7%.  The number of these events that were:  > 0 & <10 seconds: 1 > 0 seconds: 15  =>10 & <20 seconds: 2 =>10 seconds: 14  =>20 & <30 seconds: 1 =>20 seconds: 12  =>30 & <40 seconds: 0 =>30 seconds: 11  =>40 & <50 seconds: 0 =>40 seconds: 11  =>50 & <60 seconds: 1 =>50 seconds: 11  =>60 seconds: 10 =>60 seconds: 10  The mean length of desaturation events that were >=10 sec & <=3 mins was: 76.0 sec.  Desaturation event index (events >=10 sec per sampled hour): 1.9  Desaturation event index (events >= 0 sec per sampled hour): 2.0\\    OVERNIGHT OXIMETRY REPORT:    Dictated by: Hima Urbina MD  Test date: 2019  Dictated on: 2019      Comment: This test was performed on Room Air     A desaturation event was defined  as a decrease of saturation by 4 or more.    REPORT SUMMARY  Total valid samplin:20:28   High SpO2: 98%    Low SpO2: 84%    Mean SpO2  90.7 %  Cumulative time with oxygen saturation less than 88% (TC88): 0:56:12    CLINICAL INTERPRETATION   There is  significant nocturnal oxygen desaturation,  Clinical correlation is advised and  Recommend overnight polysomnography if clinically indicated    Medicare Criteria Comments:   Oximetry test results suggest the patient falls under Medicare Group 1 Criteria. ( Arterial oxygen saturation at or below 88% for at least 5 minutes taken during sleep)  Hima Urbina MD    Details about Medicare Group Criteria coverage can be found at http://www.cms.hhs.gov/manuals/downloads/

## 2019-09-04 NOTE — PROGRESS NOTES
Subjective:       Patient ID: Isidro White Jr. is a 49 y.o. male.  Patient Active Problem List   Diagnosis    Essential hypertension    Hypertensive cardiomegaly with heart failure    Pulmonary HTN    Nonrheumatic aortic valve stenosis    Adult congenital heart disease    Hyperglycemia    S/P AVR (aortic valve replacement)    Postprocedural hypotension    ESRD (end stage renal disease) on dialysis    Pleural effusion    Severe protein-calorie malnutrition    Anemia of chronic disease    Chronic diastolic heart failure    Nonischemic cardiomyopathy    Atrial tachycardia    History of radiofrequency ablation for complex right atrial arrhythmia    Drug-induced erectile dysfunction    Chronic pulmonary heart disease    Secondary hyperparathyroidism of renal origin    Chronic viral hepatitis B without delta agent and without coma    Pneumonia    Atrial fibrillation    Anticoagulated    Encounter for colorectal cancer screening    Colon cancer screening    Special screening for malignant neoplasms, colon     Immunization History   Administered Date(s) Administered    Influenza - Quadrivalent - PF (6 months and older) 03/28/2019    PPD Test 02/13/2017    Pneumococcal Conjugate - 13 Valent 03/28/2019    Td (ADULT) 09/27/2005    Tdap 01/09/2019, 01/24/2019     Social History     Tobacco Use   Smoking Status Never Smoker   Smokeless Tobacco Never Used     EPWORTH SLEEPINESS SCALE 9/3/2019   Sitting and reading 2   Watching TV 2   Sitting, inactive in a public place (e.g. a theatre or a meeting) 1   As a passenger in a car for an hour without a break 3   Lying down to rest in the afternoon when circumstances permit 2   Sitting and talking to someone 1   Sitting quietly after a lunch without alcohol 1   In a car, while stopped for a few minutes in traffic 2   Total score 14       Chief Complaint:  Isidro White Jr. is 49 y.o.  Asked to see me by Dr Hurst: needs pul clearance for  "transplant  Previously seen Dr Ernesto Rahman: 04/2019: treated with Levaquin  RML pneumonia  On Dialysis: left arm fistula Mon/WEd/Fri: await listing for transplant  C/o SOBDOE, intradialysis days.  Never smoker, but ' Huffing Sonja"  Has no inhalers or Oxygen, states may have had sleep study, result not available, but may snore, sleeps poorly.  SP AVR, on coumadin  Denies TB exposure  Hx of left thoracentesis 2017: 1500mls  Last CXR 04/28/2019 and Chest CT 04/2019: multifocal opacities : differential: edema,alveolar hemorrhage or infection: needs repeat.  No recurrence of hemoptysis mentioned in April 2019  PPD was +ve last year, TB spot : no result available however [employer] was -ve 2018  Occupational History:  reports occupational exposure to asbestos.    Avocational Exposures:  none    Pet Exposures:  None  Occupation: Retired   Exposures: Asbestos, ajax       The following portions of the patient's history were reviewed and updated as appropriate:   He  has a past medical history of Aortic valve stenosis, Atrial fibrillation, Atrial flutter, Cardiomyopathy, CHF (congestive heart failure), Drug-induced erectile dysfunction, ESRD due to hypertension, ESRD on dialysis, Hepatitis B, Hyperlipidemia, Hypertension, Secondary hyperparathyroidism of renal origin, Supraventricular tachycardia, Tachycardia, and Valvular regurgitation.  He does not have any pertinent problems on file.  He  has a past surgical history that includes ASD repair; Cardiac valuve replacement (02/08/2017); Radiofrequency ablation (03/13/2017); Cardiac catheterization; AV Graft Creation (Left, 03/2017); Colonoscopy (N/A, 8/27/2019); and Esophagogastroduodenoscopy (N/A, 8/27/2019).  His family history includes Anesthesia problems in his paternal uncle; Diabetes in his paternal aunt and paternal aunt; Heart attack in his father; Heart failure in his paternal grandmother; Hypertension in his paternal aunt; Leukemia in his mother " and paternal aunt; No Known Problems in his brother, sister, sister, and sister; Stroke in his paternal aunt; Suicide in his paternal uncle; Valvular heart disease in his maternal aunt.  He  reports that he has never smoked. He has never used smokeless tobacco. He reports that he does not drink alcohol or use drugs.  He has a current medication list which includes the following prescription(s): amlodipine, ascorbic acid (vitamin c), b complex vitamins, calcium acetate, diltiazem, enoxaparin, epoetin vikki, fish oil-omega-3 fatty acids, furosemide, hydralazine, hydroxyzine hcl, hydroxyzine pamoate, labetalol, lisinopril, multivitamin with minerals, pantoprazole, shawna-nataly rx, tenofovir, vitamin d, warfarin, zolpidem, and omega-3 fatty acids-vitamin e, and the following Facility-Administered Medications: sodium chloride 0.9% and sodium chloride 0.9%.  Current Outpatient Medications on File Prior to Visit   Medication Sig Dispense Refill    amLODIPine (NORVASC) 10 MG tablet TAKE ONE TABLET BY MOUTH EVERY DAY 30 tablet 11    ascorbic acid, vitamin C, (VITAMIN C) 500 MG tablet Take 1 tablet (500 mg total) by mouth 2 (two) times daily.      b complex vitamins tablet Take 1 tablet by mouth once daily.      calcium acetate (PHOSLO) 667 mg capsule Take 1 capsule (667 mg total) by mouth 3 (three) times daily. 90 capsule 11    diltiaZEM (CARDIZEM CD) 360 MG 24 hr capsule Take 1 capsule (360 mg total) by mouth once daily. 90 capsule 1    enoxaparin (LOVENOX) 100 mg/mL Syrg Inject 0.9 mLs (90 mg total) into the skin once daily. 5 mL 0    epoetin vikki (PROCRIT) 10,000 unit/mL injection Inject 1 mL (10,000 Units total) into the skin every Mon, Wed, Fri. To be given with HD      fish oil-omega-3 fatty acids 300-1,000 mg capsule Take 2 g by mouth once daily.      furosemide (LASIX) 80 MG tablet TAKE ONE TABLET BY MOUTH EVERY DAY (Patient taking differently: TAKE ONE TABLET BY MOUTH EVERY DAY per pt BID 6/18/19) 30 tablet 9     hydrALAZINE (APRESOLINE) 100 MG tablet Take 100 mg by mouth 3 (three) times daily.      hydrOXYzine HCl (ATARAX) 25 MG tablet TAKE 1 TABLET BY MOUTH DAILY AS NEEDED FOR ITCHING. 30 tablet 8    hydrOXYzine pamoate (VISTARIL) 25 MG Cap Take 25 mg by mouth once daily.      labetalol (NORMODYNE) 100 MG tablet TAKE ONE TABLET BY MOUTH THREE TIMES DAILY AS NEEDED IF HEART RATE GREATER THAN 120 90 tablet 3    lisinopril (PRINIVIL,ZESTRIL) 40 MG tablet TAKE ONE TABLET BY MOUTH EVERY DAY 30 tablet 9    multivitamin with minerals tablet Take 1 tablet by mouth once daily.      pantoprazole (PROTONIX) 40 MG tablet TAKE ONE TABLET BY MOUTH EVERY DAY 30 tablet 9    ETHAN-RACQUEL RX 1- mg-mg-mcg Tab TAKE ONE TABLET BY MOUTH EVERY DAY 90 tablet 2    tenofovir (VIREAD) 300 mg Tab Take 1 tablet (300 mg total) by mouth once a week. AFTER DIALYSIS 4 tablet 12    vitamin D (VITAMIN D3) 1000 units Tab Take 1,000 Units by mouth 3 (three) times a week.      warfarin (COUMADIN) 7.5 MG tablet Take 2 tablets by mouth on Thursdays and 1 tablet on all other days of the week. 35 tablet 1    zolpidem (AMBIEN) 5 MG Tab TAKE ONE TABLET BY MOUTH EACH EVENING AT BEDTIME AS NEEDED FOR INSOMNIA 30 tablet 4    omega-3 fatty acids-vitamin E (FISH OIL) 1,000 mg Cap Take 1 capsule by mouth once daily.  0     Current Facility-Administered Medications on File Prior to Visit   Medication Dose Route Frequency Provider Last Rate Last Dose    0.9%  NaCl infusion   Intravenous Continuous Christopher Jane MD        sodium chloride 0.9% flush 10 mL  10 mL Intravenous PRN Christopher Jane MD         He has No Known Allergies..    Review of Systems   Constitutional: Positive for malaise/fatigue.   HENT: Negative.         Snoring   Eyes: Negative.    Respiratory: Positive for shortness of breath. Negative for cough, hemoptysis, sputum production and wheezing.    Cardiovascular: Positive for leg swelling. Negative for orthopnea and claudication.  "  Gastrointestinal: Negative.    Genitourinary: Negative.    Musculoskeletal: Negative.    Skin: Negative.    Neurological: Negative.    Endo/Heme/Allergies: Negative.    Psychiatric/Behavioral: Positive for substance abuse.   All other systems reviewed and are negative.       Objective:     Vitals:    09/03/19 1114   BP: 138/80   Pulse: 81   Resp: 18   SpO2: 96%   Weight: 98.9 kg (218 lb 0.6 oz)   Height: 6' 2" (1.88 m)     Physical Exam   Constitutional: He is oriented to person, place, and time. Vital signs are normal. He appears well-developed and well-nourished.       HENT:   Head: Normocephalic and atraumatic.   Mouth/Throat: Oropharynx is clear and moist.   Eyes: Pupils are equal, round, and reactive to light. Conjunctivae and EOM are normal.   Neck: Normal range of motion. Neck supple.   Cardiovascular: Normal rate and regular rhythm.   Murmur heard.  metalic click   Pulmonary/Chest: Effort normal and breath sounds normal. No stridor. No respiratory distress.   Abdominal: Soft. Bowel sounds are normal.   Musculoskeletal: Normal range of motion. He exhibits no edema.   Neurological: He is alert and oriented to person, place, and time. No cranial nerve deficit. Coordination normal.   Skin: Skin is warm and dry. Capillary refill takes 2 to 3 seconds.   Psychiatric: He has a normal mood and affect.   Nursing note and vitals reviewed.        Data Review:   CBC:   Lab Results   Component Value Date    WBC 6.19 05/01/2019    RBC 2.25 (L) 05/01/2019    HGB 8.0 (L) 05/01/2019    HCT 23.8 (L) 05/01/2019    HCT 25 (L) 02/08/2017    PLT 90 (L) 05/01/2019     BMP:   Lab Results   Component Value Date    GLU 87 05/21/2019     05/21/2019    K 5.6 (H) 08/27/2019    CL 98 05/21/2019    CO2 27 05/21/2019    BUN 55 (H) 05/21/2019    CREATININE 13.1 (H) 05/21/2019    CALCIUM 10.0 05/21/2019     Radiology review:     CXR  COMPARISON:  04/17/2017    FINDINGS:  Mildly improved infiltrate in the right mid lung.  Remaining " lungs are clear.  Cardiomegaly with evidence of sternotomy and valve replacement.  No effusion or pneumothorax..  The remaining osseous structures and soft tissues are within normal limits.      Impression       Improved right mid lung infiltrate.  Cardiomegaly.         Chest CT  FINDINGS:  Prior median sternotomy.  Again demonstrated are multiple foci of airspace opacity/consolidation with surrounding ground-glass scattered throughout the peripheries of the upper lobes and lower lobes.  There are foci of tree-in-bud opacity within the inferior, posterior right middle lobe.  No sign of cavitation or peripherally enhancing abscess.  No pathologically enlarged mediastinal, hilar, or axillary lymph nodes. No aortic aneurysm or dissection. Persistent mild cardiomegaly.  Old right-sided rib fractures.  Limited images through the upper abdomen demonstrate fatty infiltration of the liver.  Enlargement of the spleen.      Impression       1. No evidence of pulmonary abscess.  2. Peripheral airspace opacities remain present consistent with multifocal bronchopneumonia.  An embolic etiology may be considered.  No signs of cavitation.  3. Persistent enlargement of the heart.  4. Fatty infiltration of the liver with splenomegaly         Diagnostics: Echocardiogram: 1 - Mild left atrial enlargement.     2 - Concentric hypertrophy.     3 - No wall motion abnormalities.     4 - Normal left ventricular systolic function (EF 60-65%).     5 - Impaired LV relaxation, elevated LAP (grade 2 diastolic dysfunction).     6 - Normal right ventricular systolic function .     7 - Pulmonary hypertension. The estimated PA systolic pressure is greater than 57 mmHg.     8 - Aortic valve prosthesis, JHOANA = 2.01 cm2, AVAi = 0.94 cm2/m2, peak velocity = 3.89 m/s, mean gradient = 31 mmHg.     9 - Mild tricuspid regurgitation.        Assessment:         Problem List Items Addressed This Visit     Essential hypertension    Hypertensive cardiomegaly with  heart failure    Relevant Orders    PULM - Arterial Blood Gases--in addition to PFT only    Stress test, pulmonary    PULSE OXIMETRY OVERNIGHT    Pulmonary HTN - Primary    Relevant Orders    Polysomnogram (CPAP will be added if patient meets diagnostic criteria.)    Complete PFT without bronchodilator - Clinic    PULM - Arterial Blood Gases--in addition to PFT only    Stress test, pulmonary    PULSE OXIMETRY OVERNIGHT    NM Lung Scan Ventilation Perfusion    Chronic diastolic heart failure    Overview     Non ischemic DCM         Relevant Orders    PULM - Arterial Blood Gases--in addition to PFT only    Stress test, pulmonary    PULSE OXIMETRY OVERNIGHT    Chronic pulmonary heart disease      Other Visit Diagnoses     Bilateral pulmonary infiltrates on CXR        Relevant Orders    X-Ray Chest PA And Lateral    Complete PFT without bronchodilator - Clinic    DANIEL (obstructive sleep apnea)        Relevant Orders    Polysomnogram (CPAP will be added if patient meets diagnostic criteria.)    Complete PFT without bronchodilator - Clinic    PULSE OXIMETRY OVERNIGHT    Histone antibody positive        Relevant Orders    CT Chest Without Contrast    SALIMA Screen w/Reflex    Complete PFT without bronchodilator - Clinic    History of positive PPD, treatment status unknown        Relevant Orders    T-SPOT TB Screening Test         Plan:        Orders Placed This Encounter   Procedures    CT Chest Without Contrast     Standing Status:   Future     Standing Expiration Date:   9/3/2020     Scheduling Instructions:      High resolution CT     Order Specific Question:   May the Radiologist modify the order per protocol to meet the clinical needs of the patient?     Answer:   Yes    X-Ray Chest PA And Lateral     Standing Status:   Future     Standing Expiration Date:   9/2/2020    NM Lung Scan Ventilation Perfusion     Standing Status:   Future     Standing Expiration Date:   9/3/2020     Order Specific Question:   May the  Radiologist modify the order per protocol to meet the clinical needs of the patient?     Answer:   Yes     Order Specific Question:   Is the patient  on a ventilator?     Answer:   No    SALIMA Screen w/Reflex     SLE DX: 710.0     Standing Status:   Future     Standing Expiration Date:   11/1/2020    T-SPOT TB Screening Test     FOR [facility] SITES: THIS TEST CAN ONLY BE ORDERED AND COLLECTED AT [facility] OR [facility] BETWEEN 5AM and 1PM. IT CANNOT BE ORDERED OR COLLECTED AT ANY OTHER SITE.       Standing Status:   Future     Standing Expiration Date:   11/1/2020    Complete PFT without bronchodilator - Clinic     Standing Status:   Future     Standing Expiration Date:   9/3/2020    PULM - Arterial Blood Gases--in addition to PFT only     Standing Status:   Future     Standing Expiration Date:   9/2/2020    Stress test, pulmonary     Standing Status:   Future     Standing Expiration Date:   9/2/2020    Polysomnogram (CPAP will be added if patient meets diagnostic criteria.)     Standing Status:   Future     Standing Expiration Date:   9/2/2020    PULSE OXIMETRY OVERNIGHT     Standing Status:   Future     Number of Occurrences:   1     Standing Expiration Date:   9/3/2020     Order Specific Question:   Reason for Test?     Answer:   Sleep Apnea Assessment     Order Specific Question:   Symptoms:     Answer:   Snoring     Need to clarify airway disease obstructive or restrictive, gas exchange, and current status off +ve PPD and any sleep disorder breathing  Currently patient doesn't manifest respiratory condition affecting daily ADL or exhibit recurrent exacebations needing Oxygen, admission or inhalers.  This will be clarified going forward    Follow up in about 6 weeks (around 10/15/2019), or Labs today and CXR, [place] and SDI questionaire, for PSG, VQ, 6MWD, ABG, ONSAT, chest CT.    This note was prepared using voice recognition system and is likely to have sound alike errors that may have been  overlooked even after proof reading.  Please call me with any questions    Discussed diagnosis, its evaluation, treatment and usual course. All questions answered.    Thank you for the courtesy of participating in the care of this patient    Hima Urbina MD

## 2019-09-05 ENCOUNTER — HOSPITAL ENCOUNTER (OUTPATIENT)
Dept: RADIOLOGY | Facility: HOSPITAL | Age: 49
Discharge: HOME | End: 2019-09-05
Attending: INTERNAL MEDICINE
Payer: MEDICARE

## 2019-09-05 ENCOUNTER — TELEPHONE (OUTPATIENT)
Dept: PULMONOLOGY | Facility: CLINIC | Age: 49
End: 2019-09-05

## 2019-09-05 DIAGNOSIS — I11.0 HYPERTENSIVE CARDIOMEGALY WITH HEART FAILURE: ICD-10-CM

## 2019-09-05 DIAGNOSIS — Z99.2 ESRD (END STAGE RENAL DISEASE) ON DIALYSIS: Chronic | ICD-10-CM

## 2019-09-05 DIAGNOSIS — R76.8 HISTONE ANTIBODY POSITIVE: ICD-10-CM

## 2019-09-05 DIAGNOSIS — N18.6 ESRD (END STAGE RENAL DISEASE) ON DIALYSIS: Chronic | ICD-10-CM

## 2019-09-05 DIAGNOSIS — I27.29 NOCTURNAL HYPOXEMIA DUE TO PULMONARY HYPERTENSION: Primary | ICD-10-CM

## 2019-09-05 DIAGNOSIS — I27.9 CHRONIC PULMONARY HEART DISEASE: ICD-10-CM

## 2019-09-05 DIAGNOSIS — I27.20 PULMONARY HTN: ICD-10-CM

## 2019-09-05 DIAGNOSIS — G47.36 NOCTURNAL HYPOXEMIA DUE TO PULMONARY HYPERTENSION: Primary | ICD-10-CM

## 2019-09-05 PROCEDURE — 71250 CT THORAX DX C-: CPT | Mod: TC,NTX

## 2019-09-05 NOTE — PROGRESS NOTES
OVERNIGHT OXIMETRY REPORT:    Dictated by: Hima Urbina MD  Test date: 2019  Dictated on: 2019      Comment: This test was performed on Room Air     A desaturation event was defined as a decrease of saturation by   4 or more.    REPORT SUMMARY  Total valid samplin:20:28   High SpO2: 98%    Low SpO2: 84%    Mean SpO2  90.7 %  Cumulative time with oxygen saturation less than 88% (TC88):   0:56:12    CLINICAL INTERPRETATION   There is  significant nocturnal oxygen desaturation,  Clinical   correlation is advised and  Recommend overnight polysomnography   if clinically indicated    Medicare Criteria Comments:   Oximetry test results suggest the patient falls under Medicare   Group 1 Criteria. ( Arterial oxygen saturation at or below 88%   for at least 5 minutes taken during sleep)  Hima Urbina MD    Details about Medicare Group Criteria coverage can be found at   http://www.cms.hhs.gov/manuals/downloads/

## 2019-09-05 NOTE — TELEPHONE ENCOUNTER
----- Message from Hima Urbina MD sent at 2019  7:55 AM CDT -----   Please inform patient overnight saturation study did show  the need for nighttime oxygen  He also needs to complete sleep study  Oxygen will be ordered    Hima Urbina MD    OVERNIGHT OXIMETRY REPORT:    Dictated by: Hima Urbina MD  Test date: 2019  Dictated on: 2019      Comment: This test was performed on Room Air     A desaturation event was defined as a decrease of saturation by 4 or more.    REPORT SUMMARY  Total valid samplin:20:28   High SpO2: 98%    Low SpO2: 84%    Mean SpO2  90.7 %  Cumulative time with oxygen saturation less than 88% (TC88): 0:56:12    CLINICAL INTERPRETATION   There is  significant nocturnal oxygen desaturation,  Clinical correlation is advised and  Recommend overnight polysomnography if clinically indicated    Medicare Criteria Comments:   Oximetry test results suggest the patient falls under Medicare Group 1 Criteria. ( Arterial oxygen saturation at or below 88% for at least 5 minutes taken during sleep)  Hima Urbina MD    Details about Medicare Group Criteria coverage can be found at http://www.cms.hhs.gov/manuals/downloads/

## 2019-09-08 ENCOUNTER — HOSPITAL ENCOUNTER (OUTPATIENT)
Dept: SLEEP MEDICINE | Facility: HOSPITAL | Age: 49
Discharge: HOME | End: 2019-09-08
Attending: INTERNAL MEDICINE
Payer: MEDICARE

## 2019-09-08 DIAGNOSIS — I27.20 PULMONARY HTN: ICD-10-CM

## 2019-09-08 DIAGNOSIS — G47.33 OSA (OBSTRUCTIVE SLEEP APNEA): ICD-10-CM

## 2019-09-08 PROCEDURE — 95810 POLYSOM 6/> YRS 4/> PARAM: CPT | Mod: 26,52,, | Performed by: INTERNAL MEDICINE

## 2019-09-08 PROCEDURE — 95810 POLYSOM 6/> YRS 4/> PARAM: CPT | Mod: NTX,52 | Performed by: INTERNAL MEDICINE

## 2019-09-08 PROCEDURE — 95810 PR POLYSOMNOGRAPHY, 4 OR MORE: ICD-10-PCS | Mod: 26,52,, | Performed by: INTERNAL MEDICINE

## 2019-09-08 NOTE — Clinical Note
No Significant Obstructive Sleep apnea(DANIEL) : Overall AHI was 0.0 events per hour.EKG showed no cardiac abnormalities.Moderate Oxygen DesaturationEEG did not show alpha intrusion.No snoring was audible during this study.Significant periodic leg movements(PLMs) during sleep. Associated arousals were significant with anarousal index of 10.0 /hour.No Significant Central Sleep Apnea (CSA)Caron White - 1970Page 2 of 3Electronically Authenticated By Hima Urbina MD on 09/10/2019 05:23 PM, from [phone] Urbina MDNPI: [id number] reduced sleep efficiency, long primary sleep latency, no REM sleep latency and no slow wave latency.Total testing time was 98 min. Total sleep time was 30 min. This isn't adequate to make a proper diagnosisof sleep disorder breathingPeriodic Limb Movement During Sleep (G47.61)Nocturnal Hypoxemia (G47.36)Insomnia related to Sleep initiation/sleep maintaince/terminalSupplemen

## 2019-09-10 ENCOUNTER — ANTI-COAG VISIT (OUTPATIENT)
Dept: CARDIOLOGY | Facility: CLINIC | Age: 49
End: 2019-09-10
Payer: MEDICARE

## 2019-09-10 ENCOUNTER — HOSPITAL ENCOUNTER (OUTPATIENT)
Dept: RADIOLOGY | Facility: HOSPITAL | Age: 49
Discharge: HOME | End: 2019-09-10
Attending: INTERNAL MEDICINE
Payer: MEDICARE

## 2019-09-10 DIAGNOSIS — Z79.01 LONG TERM (CURRENT) USE OF ANTICOAGULANTS: Primary | ICD-10-CM

## 2019-09-10 DIAGNOSIS — R91.8 BILATERAL PULMONARY INFILTRATES ON CXR: ICD-10-CM

## 2019-09-10 DIAGNOSIS — I27.20 PULMONARY HTN: ICD-10-CM

## 2019-09-10 DIAGNOSIS — Z95.2 S/P AVR (AORTIC VALVE REPLACEMENT): ICD-10-CM

## 2019-09-10 DIAGNOSIS — I48.91 ATRIAL FIBRILLATION, UNSPECIFIED TYPE: ICD-10-CM

## 2019-09-10 LAB — INR PPP: 2.5 (ref 2–3)

## 2019-09-10 PROCEDURE — 71046 X-RAY EXAM CHEST 2 VIEWS: CPT | Mod: TC,TXP

## 2019-09-10 PROCEDURE — 93793 ANTICOAG MGMT PT WARFARIN: CPT | Mod: ,,,

## 2019-09-10 PROCEDURE — 78582 LUNG VENTILAT&PERFUS IMAGING: CPT | Mod: TC,TXP

## 2019-09-10 PROCEDURE — 93793 PR ANTICOAGULANT MGMT FOR PT TAKING WARFARIN: ICD-10-PCS | Mod: ,,,

## 2019-09-10 PROCEDURE — 85610 PROTHROMBIN TIME: CPT | Mod: PBBFAC,TXP

## 2019-09-10 NOTE — PROGRESS NOTES
Patient's INR is therapeutic at 2.5.  No upcoming procedures or changes reported.  No changes in dose.  Continue current dose of  Warfarin 15mg on Thursdays and 7.5mg on all other days of the week. Patient voiced understanding.  Recheck on 9/19/19 before cardiology appt.  Please call should you have any questions or concerns at [phone] or [phone].

## 2019-09-10 NOTE — PROCEDURES
"No Significant Obstructive Sleep apnea(DANIEL) : Overall AHI was 0.0 events per hour.  EKG showed no cardiac abnormalities.  Moderate Oxygen Desaturation  EEG did not show alpha intrusion.  No snoring was audible during this study.  Significant periodic leg movements(PLMs) during sleep. Associated arousals were significant with an  arousal index of 10.0 /hour.  No Significant Central Sleep Apnea (CSA)  Isidro White - 1970  Page 2 of 3  Electronically Authenticated By Hima Urbina MD on 09/10/2019 05:23 PM, from [phone]  Hima Urbina MD  NPI: [id number]  DIAGNOSIS  RECOMMENDATIONS  MISCELLANEOUS  Kenny reduced sleep efficiency, long primary sleep latency, no REM sleep latency and no slow wave latency.  Total testing time was 98 min. Total sleep time was 30 min. This isn't adequate to make a proper diagnosis  of sleep disorder breathing  Periodic Limb Movement During Sleep (G47.61)  Nocturnal Hypoxemia (G47.36)  Insomnia related to Sleep initiation/sleep maintaince/terminal  Supplementary oxygen during sleep should be considered.  Sleep hygiene. Consider repeat testing based on symptoms.  Clinical correlation for restless legs syndrome . check Serum ferritin  Avoid alcohol, sedatives and other CNS depressants that may worsen sleep apnea and disrupt normal sleep  architecture.  Sleep hygiene should be reviewed to assess factors that may improve sleep quality.    See imported Sleep Study result in "Chart Review" under the   "Media tab".      (This Sleep Study was interpreted by a Board Certified Sleep   Specialist who conducted an epoch-by-epoch review of the entire   raw data recording.)     (The indication for this sleep study was reviewed and deemed   appropriate by [facility] Practice Parameters or other reasons by a   Board Certified Sleep Specialist.)    Hima Urbina MD      "

## 2019-09-12 ENCOUNTER — TELEPHONE (OUTPATIENT)
Dept: HEPATOLOGY | Facility: CLINIC | Age: 49
End: 2019-09-12

## 2019-09-12 DIAGNOSIS — B18.1 CHRONIC VIRAL HEPATITIS B WITHOUT DELTA AGENT AND WITHOUT COMA: ICD-10-CM

## 2019-09-12 DIAGNOSIS — B18.1 CHRONIC VIRAL HEPATITIS B WITHOUT DELTA AGENT AND WITHOUT COMA: Primary | ICD-10-CM

## 2019-09-12 RX ORDER — TENOFOVIR DISOPROXIL FUMARATE 300 MG/1
300 TABLET, FILM COATED ORAL WEEKLY
Qty: 12 TABLET | Refills: 3 | Status: SHIPPED | OUTPATIENT
Start: 2019-09-12 | End: 2020-06-23

## 2019-09-12 NOTE — TELEPHONE ENCOUNTER
Case discussed with Dr. Martini. Fibroscan suggests cirrhosis and he has thrombocytopenia though no other findings of portal hypertension on imaging or EGD. OK to proceed with kidney transplant from hepatology standpoint, will notify txp coordinator. Will continue tenofovir and monitoring HBV serologies / LFTs. Next ultrasound for HCC screening due 12/2019.

## 2019-09-12 NOTE — TELEPHONE ENCOUNTER
----- Message from Javid Martini MD sent at 9/9/2019  3:20 PM CDT -----  I think we can go for Kidney alone.  Is he on treatment for HBV?    ----- Message -----  From: Lina Dominguez NP  Sent: 9/5/2019  11:16 AM  To: Javid Martini MD    This is someone we have previously discussed.  Pre-kidney transplant. Chronic HBV, on tenofovir with low DNA and normal LFTs.    Fibroscan suggested cirrhosis and he has thrombocytopenia but no other evidence of portal HTN or hepatic decompensation. Initially recommended TJ biopsy though this was going to be quite difficult with his anticoagulation (cardiologist wanted him inpatient on heparin gtt, etc).    We decided to just get EGD which was done 8/27 - no varices or PHG.    Should he be discussed on Tues to decide if ok for just kidney?     Thanks,  Lina

## 2019-09-12 NOTE — TELEPHONE ENCOUNTER
Message from Lina Dominguez NP;  Please schedule labs (CBC, CMP, INR, AFP, HBV DNA) and US in [place] in December.    US and Labs scheduled for Thurs 12/5/19 in . Appt letters placed in the mail.

## 2019-09-19 ENCOUNTER — OFFICE VISIT (OUTPATIENT)
Dept: CARDIOLOGY | Facility: CLINIC | Age: 49
End: 2019-09-19
Payer: MEDICARE

## 2019-09-19 ENCOUNTER — ANTI-COAG VISIT (OUTPATIENT)
Dept: CARDIOLOGY | Facility: CLINIC | Age: 49
End: 2019-09-19
Payer: MEDICARE

## 2019-09-19 VITALS
DIASTOLIC BLOOD PRESSURE: 80 MMHG | SYSTOLIC BLOOD PRESSURE: 130 MMHG | WEIGHT: 218.5 LBS | HEART RATE: 84 BPM | BODY MASS INDEX: 28.05 KG/M2

## 2019-09-19 DIAGNOSIS — R07.89 OTHER CHEST PAIN: ICD-10-CM

## 2019-09-19 DIAGNOSIS — Z98.890 HISTORY OF RADIOFREQUENCY ABLATION FOR COMPLEX RIGHT ATRIAL ARRHYTHMIA: ICD-10-CM

## 2019-09-19 DIAGNOSIS — Z95.2 S/P AVR (AORTIC VALVE REPLACEMENT): ICD-10-CM

## 2019-09-19 DIAGNOSIS — Z79.01 ANTICOAGULATED: ICD-10-CM

## 2019-09-19 DIAGNOSIS — I50.32 CHRONIC DIASTOLIC HEART FAILURE: Primary | ICD-10-CM

## 2019-09-19 DIAGNOSIS — Z01.31 ENCOUNTER FOR EXAMINATION OF BLOOD PRESSURE WITH ABNORMAL FINDINGS: ICD-10-CM

## 2019-09-19 DIAGNOSIS — Z79.01 LONG TERM (CURRENT) USE OF ANTICOAGULANTS: Primary | ICD-10-CM

## 2019-09-19 DIAGNOSIS — Z01.810 PREOP CARDIOVASCULAR EXAM: ICD-10-CM

## 2019-09-19 DIAGNOSIS — I35.0 NONRHEUMATIC AORTIC VALVE STENOSIS: ICD-10-CM

## 2019-09-19 DIAGNOSIS — I11.0 HYPERTENSIVE CARDIOMEGALY WITH HEART FAILURE: ICD-10-CM

## 2019-09-19 DIAGNOSIS — I42.8 NONISCHEMIC CARDIOMYOPATHY: ICD-10-CM

## 2019-09-19 DIAGNOSIS — I48.91 ATRIAL FIBRILLATION, UNSPECIFIED TYPE: ICD-10-CM

## 2019-09-19 DIAGNOSIS — Q24.9 ADULT CONGENITAL HEART DISEASE: ICD-10-CM

## 2019-09-19 LAB — INR PPP: 1.4 (ref 2–3)

## 2019-09-19 PROCEDURE — 99214 OFFICE O/P EST MOD 30 MIN: CPT | Mod: S$PBB,NTX,, | Performed by: INTERNAL MEDICINE

## 2019-09-19 PROCEDURE — 99214 PR OFFICE/OUTPT VISIT, EST, LEVL IV, 30-39 MIN: ICD-10-PCS | Mod: S$PBB,NTX,, | Performed by: INTERNAL MEDICINE

## 2019-09-19 PROCEDURE — 99213 OFFICE O/P EST LOW 20 MIN: CPT | Mod: PBBFAC,NTX | Performed by: INTERNAL MEDICINE

## 2019-09-19 PROCEDURE — 85610 PROTHROMBIN TIME: CPT | Mod: PBBFAC,NTX

## 2019-09-19 PROCEDURE — 99999 PR PBB SHADOW E&M-EST. PATIENT-LVL III: CPT | Mod: PBBFAC,TXP,, | Performed by: INTERNAL MEDICINE

## 2019-09-19 PROCEDURE — 99999 PR PBB SHADOW E&M-EST. PATIENT-LVL III: ICD-10-PCS | Mod: PBBFAC,TXP,, | Performed by: INTERNAL MEDICINE

## 2019-09-19 NOTE — PROGRESS NOTES
Patient's INR is sub-therapeutic at 1.4.   Mr. White reports he was eaten a few green salads this week.  Consistency stressed - patient agrees to resume normal diet of 1 serving per week.   No abnormal pain, swelling or weakness noted.  Instructions given for patient to take warfarin 15mg on today and tomorrow (boosted dose 9/20); then resume current dose of 15mg on Thursdays and 7.5mg on all other days of the week.  Patient voiced understanding.  Follow-up in 1 week.

## 2019-09-19 NOTE — PROGRESS NOTES
"Subjective:   Patient ID:  Isidro White Jr. is a 49 y.o. male who presents for cardiac consult of Hypertension (3 mon f/u)      Chest Pain    Associated symptoms include malaise/fatigue. Pertinent negatives include no palpitations or shortness of breath.   His past medical history is significant for CHF.   Congestive Heart Failure   Associated symptoms include chest pain. Pertinent negatives include no palpitations or shortness of breath.   Hypertension   Associated symptoms include chest pain and malaise/fatigue. Pertinent negatives include no palpitations or shortness of breath.     The patient came in today for cardiac consult of Hypertension (3 mon f/u)    Isidro White Jr. is a 49 y.o. male pt with ASD closure at age 8 ([facility]), NICM, s/p AVR with a 22 mm mechanical valve and TV annuloplasty with a 28 mm ring 3/17, HTN, SVT, EP performed RFA (3/13/17) and has been on HD - MWF since Feb 8,2016 since his surgery, referred by Dr Hurst (Nephrology) for evaluation and management of PH as he is being considered for kidney transplant, seen by Dr. Braun in [facility] here for CV follow up.     8/9/28  He had a RHC 6/18 showing normal cardiac output, normal filling pressures and MILD PH with mean pap 32 mmHg.  Pt feels very weak on HD days. After RHC HR has been in 109-110 range, started on cardizem. HR now normal. Pt felt palpitations now feels ok.     12/13/18  Holter from 9/4/18 revealed freq PVCs, dilt increased to 240 mg. No palpitations. Has been doing well lately, BP is well controlled. Labwork from HD has been normal. Still makes some urine, says he has some hematuria. Will need annual stress and 2D echo. He is also undergoing workup for HepB will see Dr. Youngblood.     3/15/19  Recent ER eval at [facility] - He presented with chest pain and shortness of breath. He is a dialysis patient has a history of anemia. He states "I believe that the lack of oxygen". Patient has received blood transfusions " in the past but not recently. Patient had dialysis yesterday and is a Monday Wednesday Friday dialysis patient. His troponin is mildly elevated. His chest x-ray is relatively clear. We do not see a large effusion and there is no obvious infiltrate at this time. Patient states at times he has chest pain and it feels like he is smothering. The safest plan is to admit the patient and repeat his cardiac enzymes and consider given the patient a blood transfusion. However the patient is a dialysis patient so we are unable to admit him at this facility. Patient nephrologist is an [facility] and [facility]  Pt Left AMA and now here for follow up.   Pt has SOB usually at night, feels suffocated and can hear himself snore and wakes up. No prior sleep study.      6/18/19  Pt will need EGD/Cscope. Will be bridged with heparin while holding coumadin and followed at coumadin clinic. Pt also needs liver biopsy, he will be ruled out for esophageal varicies. He also had recent admission for PNA, tx with abx. Now feels well, breathing normal. No CP/SOB.     9/19/19  Per recent workup - Fibroscan suggests cirrhosis and he has thrombocytopenia though no other findings of portal hypertension on imaging or EGD. OK to proceed with kidney transplant from hepatology standpoint. For renal transplant process- recent stress and echo are normal.      Patient feels no chest pain, no leg swelling, no PND, no palpitation, no dizziness, no syncope, no CNS symptoms.    Patient has fairly good exercise tolerance.     Patient is compliant with medications.    2/19 Stress test  · The perfusion scan is free of evidence from myocardial ischemia or injury.  · An ejection fraction of 75 % at rest  · Resting wall motion is physiologic.  · Post stress wall motion is physiologic.  · LV cavity size is normal at rest.  · There was no ST segment deviation noted during stress.  · The EKG portion of this study is negative for myocardial  ischemia.  · Arrhythmias during stress: rare PVCs.  · The patient reported SOB (non-anginal) during the stress test.  · There is no prior study available for comparison.        2D ECHO 05/01/2019   CONCLUSIONS     1 - Mild left atrial enlargement.     2 - Concentric hypertrophy.     3 - No wall motion abnormalities.     4 - Normal left ventricular systolic function (EF 60-65%).     5 - Impaired LV relaxation, elevated LAP (grade 2 diastolic dysfunction).     6 - Normal right ventricular systolic function .     7 - Pulmonary hypertension. The estimated PA systolic pressure is greater than 57 mmHg.     8 - Aortic valve prosthesis, JHOANA = 2.01 cm2, AVAi = 0.94 cm2/m2, peak velocity = 3.89 m/s, mean gradient = 31 mmHg.     9 - Mild tricuspid regurgitation.           9/4/18 HOLTER  TEST DESCRIPTION   PREDOMINANT RHYTHM  1. Sinus rhythm with heart rates varying between 69 and 124 bpm with an average of 85 bpm.   VENTRICULAR ARRHYTHMIAS  1. There were frequent PVCs totalling 1738 and averaging 36 per hour.  There were 9 bigeminal cycles.  There were 3 triplets.   2. There was one (6 beat) run of non-sustained ventricular tachycardia. The rate was 125 bpm.     SUPRA VENTRICULAR ARRHYTHMIAS  1. There were occasional PACs totalling 995 and averaging 20 per hour.  There were 9 triplets.   2. There were 4 (12 beat) runs of non-sustained SVT. The rate was 145 bpm.             Cath 2/17  1. Coronary angiography.      a.     Left main widely patent.      b.     LAD widely patent.      c.     Ramus widely patent and massive in size.      d.     Circumflex widely patent with a small OM 1 and OM 2 branch.      e.     Right coronary widely patent.  2. Hemodynamics:  Right heart catheterization.      a.     Pulmonary capillary wedge pressure 33 at end-expiration.      b.     Pulmonary artery pressure 78/42.      c.     Right ventricular pressure 70/21.      d.     Right atrial pressure 22 with a peak V-wave of 24.      e.     Cardiac  output by thermodilution is between 6 and 7       L/minute, by Patricia 6.1 L/minute.    CONCLUSION  1. Normal coronary arteries.  2. Pulmonary hypertension.  3. Severe aortic insufficiency.      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     HD M, W, F    ESRD on dialysis     Hepatitis B     Hyperlipidemia     Hypertension     Secondary hyperparathyroidism of renal origin     Supraventricular tachycardia     atrial tachycardia    Tachycardia     Valvular regurgitation     AI, TR       Past Surgical History:   Procedure Laterality Date    ABLATION N/A 3/13/2017    Performed by [facility]el [facility]allpa [facility] at [facility] CATH LAB    ASD REPAIR      age 8 [facility]    AV Graft Creation Left 03/2017    CARD[facility]AC [facility]HETERIZATION      [facility]     CARDIAC VALVE SURGERY  02/08/2017    22 mm Medtronic AV, 28 mm TV Medtronic annuloplaty ring    COLONOSCOPY N/A 8/27/2019    [facility] at [facility] ENDO    EGD (ESOPHAGOGASTRODUODENOSCOPY) N/A 8/27/2019    Performed by [facility] at [facility] ENDO    [id number]-[id number]-DNEJYDREELFHY Accuseal Left 3/16/2017    Performed by Alcides Manning MD at [facility] OR 2ND FLR    RADIOFREQUENCY ABLATION  03/13/2017    Atrial tachycardia    REDO STERNOTOMY WITH AORTIC VALVE REPLACEMENT/redo sternotomy N/A 2/8/2017    Performed by Jose Gastelum MD at [facility] OR 2ND FLR    REPAIR-VALVE-TRICUSPID  2/8/2017    Performed by Jose [facility]um MD at [facility] OR 2ND FLR    REPLACEMENT-VALVE-AORTIC N/A 2/8/2017    Performed by Jose [facility]elum MD at [facility] OR 2ND FLR       Social History     Tobacco Use    Smoking status: Never Smoker    Smokeless tobacco: Never Used   Substance Use Topics    Alcohol use: No     Frequency: Never     Comment: quit in 2016; does  have heavy drinking history; started drinking at age 13; was drinking a 12 pack over the weekend and two 24 ounces of beer a day during the week along with a pint of hard alcohol    Drug use: No       Family History   Problem Relation Age of Onset    Leukemia Mother     Heart attack Father         massive MI at age 67    No Known Problems Sister     No Known Problems Brother     No Known Problems Sister     No Known Problems Sister     Heart failure Paternal Grandmother     Diabetes Paternal Aunt     Hypertension Paternal Aunt     Leukemia Paternal Aunt         CLL    Suicide Paternal Uncle     Anesthesia problems Paternal Uncle     Diabetes Paternal Aunt     Stroke Paternal Aunt     Valvular heart disease Maternal Aunt     Kidney disease Neg Hx     Colon cancer Neg Hx        Patient's Medications   New Prescriptions    No medications on file   Previous Medications    AMLODIPINE (NORVASC) 10 MG TABLET    TAKE ONE TABLET BY MOUTH EVERY DAY    ASCORBIC ACID, VITAMIN C, (VITAMIN C) 500 MG TABLET    Take 1 tablet (500 mg total) by mouth 2 (two) times daily.    B COMPLEX VITAMINS TABLET    Take 1 tablet by mouth once daily.    CALCIUM ACETATE (PHOSLO) 667 MG CAPSULE    Take 1 capsule (667 mg total) by mouth 3 (three) times daily.    DILTIAZEM (CARDIZEM CD) 360 MG 24 HR CAPSULE    Take 1 capsule (360 mg total) by mouth once daily.    ENOXAPARIN (LOVENOX) 100 MG/ML SYRG    Inject 0.9 mLs (90 mg total) into the skin once daily.    EPOETIN TORRIE (PROCRIT) 10,000 UNIT/ML INJECTION    Inject 1 mL (10,000 Units total) into the skin every Mon, Wed, Fri. To be given with HD    FISH OIL-OMEGA-3 FATTY ACIDS 300-1,000 MG CAPSULE    Take 2 g by mouth once daily.    FUROSEMIDE (LASIX) 80 MG TABLET    TAKE ONE TABLET BY MOUTH EVERY DAY    HYDRALAZINE (APRESOLINE) 100 MG TABLET    Take 100 mg by mouth 3 (three) times daily.    HYDROXYZINE HCL (ATARAX) 25 MG TABLET    TAKE 1 TABLET BY MOUTH DAILY AS NEEDED FOR ITCHING.     HYDROXYZINE PAMOATE (VISTARIL) 25 MG CAP    Take 25 mg by mouth once daily.    LABETALOL (NORMODYNE) 100 MG TABLET    TAKE ONE TABLET BY MOUTH THREE TIMES DAILY AS NEEDED IF HEART RATE GREATER THAN 120    LISINOPRIL (PRINIVIL,ZESTRIL) 40 MG TABLET    TAKE ONE TABLET BY MOUTH EVERY DAY    MULTIVITAMIN WITH MINERALS TABLET    Take 1 tablet by mouth once daily.    OMEGA-3 FATTY ACIDS-VITAMIN E (FISH OIL) 1,000 MG CAP    Take 1 capsule by mouth once daily.    PANTOPRAZOLE (PROTONIX) 40 MG TABLET    TAKE ONE TABLET BY MOUTH EVERY DAY    ETHAN-RACQUEL RX 1- MG-MG-MCG TAB    TAKE ONE TABLET BY MOUTH EVERY DAY    TENOFOVIR (VIREAD) 300 MG TAB    Take 1 tablet (300 mg total) by mouth once a week. AFTER DIALYSIS    VITAMIN D (VITAMIN D3) 1000 UNITS TAB    Take 1,000 Units by mouth 3 (three) times a week.    WARFARIN (COUMADIN) 7.5 MG TABLET    Take 2 tablets by mouth on Thursdays and 1 tablet on all other days of the week.    ZOLPIDEM (AMBIEN) 5 MG TAB    TAKE ONE TABLET BY MOUTH EACH EVENING AT BEDTIME AS NEEDED FOR INSOMNIA   Modified Medications    No medications on file   Discontinued Medications    No medications on file       Review of Systems   Constitutional: Positive for malaise/fatigue.   HENT: Negative.    Eyes: Negative.    Respiratory: Negative for shortness of breath.    Cardiovascular: Positive for chest pain. Negative for palpitations.   Gastrointestinal: Negative.    Genitourinary: Positive for hematuria.   Musculoskeletal: Negative.    Skin: Negative.    Neurological: Negative.    Endo/Heme/Allergies: Negative.    Psychiatric/Behavioral: Negative.    All 12 systems otherwise negative.      Wt Readings from Last 3 Encounters:   09/19/19 99.1 kg (218 lb 7.6 oz)   09/03/19 98.9 kg (218 lb 0.6 oz)   08/27/19 95.4 kg (210 lb 5.1 oz)     Temp Readings from Last 3 Encounters:   08/27/19 98.6 °F (37 °C) (Temporal)   05/01/19 98 °F (36.7 °C) (Oral)   04/17/19 99.2 °F (37.3 °C) (Oral)     BP Readings from Last 3  Encounters:   09/19/19 130/80   09/03/19 138/80   08/27/19 122/71     Pulse Readings from Last 3 Encounters:   09/19/19 84   09/03/19 81   08/27/19 76       /80 (BP Location: Right arm, Patient Position: Sitting, BP Method: Medium (Manual))   Pulse 84   Wt 99.1 kg (218 lb 7.6 oz)   BMI 28.05 kg/m²      Objective:   Physical Exam   Constitutional: He is oriented to person, place, and time. He appears well-developed and well-nourished. No distress.   HENT:   Head: Normocephalic and atraumatic.   Nose: Nose normal.   Mouth/Throat: Oropharynx is clear and moist.   Eyes: Conjunctivae and EOM are normal. No scleral icterus.   Neck: Normal range of motion. Neck supple. No JVD present. No thyromegaly present.   Cardiovascular: Normal rate, regular rhythm, S1 normal and S2 normal. Exam reveals no gallop, no S3, no S4 and no friction rub.   Murmur heard.   Midsystolic murmur is present at the upper right sternal border.  Bryan S2   Pulmonary/Chest: Effort normal and breath sounds normal. No stridor. No respiratory distress. He has no wheezes. He has no rales. He exhibits no tenderness.   Abdominal: Soft. Bowel sounds are normal. He exhibits no distension and no mass. There is no tenderness. There is no rebound.   Genitourinary:   Genitourinary Comments: Deferred   Musculoskeletal: Normal range of motion. He exhibits no edema, tenderness or deformity.   Lymphadenopathy:     He has no cervical adenopathy.   Neurological: He is alert and oriented to person, place, and time. He exhibits normal muscle tone. Coordination normal.   Skin: Skin is warm and dry. No rash noted. He is not diaphoretic. No erythema. No pallor.   Psychiatric: He has a normal mood and affect. His behavior is normal. Judgment and thought content normal.   Nursing note and vitals reviewed.      Lab Results   Component Value Date     05/21/2019    K 5.6 (H) 08/27/2019    CL 98 05/21/2019    CO2 27 05/21/2019    BUN 55 (H) 05/21/2019    CREATININE  13.1 (H) 05/21/2019    GLU 87 05/21/2019    HGBA1C 4.4 04/29/2019    MG 2.1 04/30/2019    AST 22 05/21/2019    ALT 24 05/21/2019    ALBUMIN 2.8 (L) 05/21/2019    PROT 8.2 05/21/2019    BILITOT 0.4 05/21/2019    WBC 6.19 05/01/2019    HGB 8.0 (L) 05/01/2019    HCT 23.8 (L) 05/01/2019    HCT 25 (L) 02/08/2017     (H) 05/01/2019    PLT 90 (L) 05/01/2019    INR 1.4 (A) 09/19/2019    INR 1.0 08/27/2019    CHOL 213 (H) 01/08/2019    HDL 50 01/08/2019    LDLCALC 142.4 01/08/2019    TRIG 103 01/08/2019     (H) 04/17/2019     Assessment:      1. Chronic diastolic heart failure    2. Hypertensive cardiomegaly with heart failure    3. Nonrheumatic aortic valve stenosis    4. Adult congenital heart disease    5. S/P AVR (aortic valve replacement)    6. Nonischemic cardiomyopathy    7. History of radiofrequency ablation for complex right atrial arrhythmia    8. Anticoagulated    9. Other chest pain    10. Preop cardiovascular exam        Plan:   1. Chronic Diastolic HF  - recovered EF  - cont low salt diet  - pt euvolemic     2. S/p mech AVR  - cont coumadin, check INR  - f/u with coumadin clinic here    3. ESRD  - cont HD  - stable CV status for transplant, recent stress/echo negative  - repeat stress echo in 6 months    4. HTN  - cont meds    6. Tachycardia sec to PVCs  - cont BB and CCB    7. HLD  - cont statin      Thank you for allowing me to participate in this patient's care. Please do not hesitate to contact me with any questions or concerns. Consult note has been forwarded to the referral physician.

## 2019-09-20 ENCOUNTER — OFFICE VISIT (OUTPATIENT)
Dept: INTERNAL MEDICINE | Facility: CLINIC | Age: 49
End: 2019-09-20
Payer: MEDICARE

## 2019-09-20 ENCOUNTER — LAB VISIT (OUTPATIENT)
Dept: LAB | Facility: HOSPITAL | Age: 49
End: 2019-09-20
Attending: FAMILY MEDICINE
Payer: MEDICARE

## 2019-09-20 VITALS
DIASTOLIC BLOOD PRESSURE: 76 MMHG | BODY MASS INDEX: 28.32 KG/M2 | SYSTOLIC BLOOD PRESSURE: 128 MMHG | HEART RATE: 74 BPM | WEIGHT: 220.69 LBS | TEMPERATURE: 96 F | OXYGEN SATURATION: 96 % | HEIGHT: 74 IN

## 2019-09-20 DIAGNOSIS — I11.0 HYPERTENSIVE CARDIOMEGALY WITH HEART FAILURE: ICD-10-CM

## 2019-09-20 DIAGNOSIS — E43 SEVERE PROTEIN-CALORIE MALNUTRITION: ICD-10-CM

## 2019-09-20 DIAGNOSIS — R79.9 ABNORMAL FINDING OF BLOOD CHEMISTRY: ICD-10-CM

## 2019-09-20 DIAGNOSIS — Z95.2 S/P AVR (AORTIC VALVE REPLACEMENT): ICD-10-CM

## 2019-09-20 DIAGNOSIS — Z79.01 ANTICOAGULATED: ICD-10-CM

## 2019-09-20 DIAGNOSIS — Z99.2 ESRD (END STAGE RENAL DISEASE) ON DIALYSIS: Chronic | ICD-10-CM

## 2019-09-20 DIAGNOSIS — I10 ESSENTIAL HYPERTENSION: ICD-10-CM

## 2019-09-20 DIAGNOSIS — N18.6 ESRD (END STAGE RENAL DISEASE) ON DIALYSIS: Chronic | ICD-10-CM

## 2019-09-20 DIAGNOSIS — D63.8 ANEMIA OF CHRONIC DISEASE: ICD-10-CM

## 2019-09-20 DIAGNOSIS — B18.1 CHRONIC VIRAL HEPATITIS B WITHOUT DELTA AGENT AND WITHOUT COMA: Primary | ICD-10-CM

## 2019-09-20 DIAGNOSIS — I27.9 CHRONIC PULMONARY HEART DISEASE: ICD-10-CM

## 2019-09-20 DIAGNOSIS — B18.1 CHRONIC VIRAL HEPATITIS B WITHOUT DELTA AGENT AND WITHOUT COMA: ICD-10-CM

## 2019-09-20 LAB
ESTIMATED AVG GLUCOSE: 91 MG/DL (ref 68–131)
HBA1C MFR BLD HPLC: 4.8 % (ref 4–5.6)

## 2019-09-20 PROCEDURE — 83036 HEMOGLOBIN GLYCOSYLATED A1C: CPT | Mod: NTX

## 2019-09-20 PROCEDURE — 99999 PR PBB SHADOW E&M-EST. PATIENT-LVL III: ICD-10-PCS | Mod: PBBFAC,,, | Performed by: FAMILY MEDICINE

## 2019-09-20 PROCEDURE — 99999 PR PBB SHADOW E&M-EST. PATIENT-LVL III: CPT | Mod: PBBFAC,,, | Performed by: FAMILY MEDICINE

## 2019-09-20 PROCEDURE — 36415 COLL VENOUS BLD VENIPUNCTURE: CPT | Mod: NTX

## 2019-09-20 PROCEDURE — 86592 SYPHILIS TEST NON-TREP QUAL: CPT | Mod: TXP

## 2019-09-20 PROCEDURE — 99204 OFFICE O/P NEW MOD 45 MIN: CPT | Mod: S$PBB,,, | Performed by: FAMILY MEDICINE

## 2019-09-20 PROCEDURE — 99204 PR OFFICE/OUTPT VISIT, NEW, LEVL IV, 45-59 MIN: ICD-10-PCS | Mod: S$PBB,,, | Performed by: FAMILY MEDICINE

## 2019-09-20 PROCEDURE — 99213 OFFICE O/P EST LOW 20 MIN: CPT | Mod: PBBFAC | Performed by: FAMILY MEDICINE

## 2019-09-21 LAB — RPR SER QL: NORMAL

## 2019-09-23 ENCOUNTER — TELEPHONE (OUTPATIENT)
Dept: INTERNAL MEDICINE | Facility: CLINIC | Age: 49
End: 2019-09-23

## 2019-09-26 ENCOUNTER — ANTI-COAG VISIT (OUTPATIENT)
Dept: CARDIOLOGY | Facility: CLINIC | Age: 49
End: 2019-09-26
Payer: MEDICARE

## 2019-09-26 DIAGNOSIS — Z95.2 S/P AVR (AORTIC VALVE REPLACEMENT): ICD-10-CM

## 2019-09-26 DIAGNOSIS — Z79.01 LONG TERM (CURRENT) USE OF ANTICOAGULANTS: Primary | ICD-10-CM

## 2019-09-26 LAB — INR PPP: 2.7 (ref 2–3)

## 2019-09-26 PROCEDURE — 93793 PR ANTICOAGULANT MGMT FOR PT TAKING WARFARIN: ICD-10-PCS | Mod: ,,,

## 2019-09-26 PROCEDURE — 93793 ANTICOAG MGMT PT WARFARIN: CPT | Mod: ,,,

## 2019-09-26 PROCEDURE — 85610 PROTHROMBIN TIME: CPT | Mod: PBBFAC

## 2019-09-26 NOTE — PROGRESS NOTES
Patient's INR is therapeutic at 2..  No upcoming procedures or changes reported.  No changes in dose.  Continue current dose of warfarin 15mg on Thursdays and 7.5mg on all other days of the week. Patient voiced understanding.  Recheck in 2 weeks.  Please call should you have any questions or concerns at [phone] or [phone].

## 2019-10-01 ENCOUNTER — TELEPHONE (OUTPATIENT)
Dept: TRANSPLANT | Facility: CLINIC | Age: 49
End: 2019-10-01

## 2019-10-01 RX ORDER — HYDROXYZINE PAMOATE 25 MG/1
CAPSULE ORAL
Qty: 30 CAPSULE | Refills: 2 | Status: SHIPPED | OUTPATIENT
Start: 2019-10-01 | End: 2020-04-27

## 2019-10-01 NOTE — TELEPHONE ENCOUNTER
----- Message from Geovanni Venegas sent at 10/1/2019 11:39 AM CDT -----  Contact: Patient      ----- Message -----  From: Rudy Olguin  Sent: 10/1/2019  11:24 AM CDT  To: [facility] Pre-Kidney Transplant Non-Clinical    Staff Message     Caller name: Pt    Reason for call: Calling to speak with Analilia, wants to check the status of transplant        Communication Preference: [phone]    Additional Information:

## 2019-10-01 NOTE — TELEPHONE ENCOUNTER
Call returned and Isidro updated that I am waiting on the clearance from cardiology, and him to finish with pulmonary to see if I can get clearance there. I will follow up with him at the end of October.

## 2019-10-02 ENCOUNTER — OFFICE VISIT (OUTPATIENT)
Dept: INTERNAL MEDICINE | Facility: CLINIC | Age: 49
End: 2019-10-02
Payer: MEDICARE

## 2019-10-02 VITALS
TEMPERATURE: 96 F | DIASTOLIC BLOOD PRESSURE: 84 MMHG | SYSTOLIC BLOOD PRESSURE: 130 MMHG | WEIGHT: 218.69 LBS | HEART RATE: 86 BPM | OXYGEN SATURATION: 95 % | HEIGHT: 74 IN | BODY MASS INDEX: 28.07 KG/M2

## 2019-10-02 DIAGNOSIS — I11.0 HYPERTENSIVE CARDIOMEGALY WITH HEART FAILURE: ICD-10-CM

## 2019-10-02 DIAGNOSIS — I27.9 CHRONIC PULMONARY HEART DISEASE: ICD-10-CM

## 2019-10-02 DIAGNOSIS — I27.20 PULMONARY HTN: ICD-10-CM

## 2019-10-02 DIAGNOSIS — N52.2 DRUG-INDUCED ERECTILE DYSFUNCTION: ICD-10-CM

## 2019-10-02 DIAGNOSIS — N18.6 ESRD (END STAGE RENAL DISEASE) ON DIALYSIS: ICD-10-CM

## 2019-10-02 DIAGNOSIS — I50.32 CHRONIC DIASTOLIC HEART FAILURE: ICD-10-CM

## 2019-10-02 DIAGNOSIS — N52.1 ERECTILE DYSFUNCTION DUE TO DISEASES CLASSIFIED ELSEWHERE: Primary | ICD-10-CM

## 2019-10-02 DIAGNOSIS — B18.1 CHRONIC VIRAL HEPATITIS B WITHOUT DELTA AGENT AND WITHOUT COMA: ICD-10-CM

## 2019-10-02 DIAGNOSIS — Z99.2 ESRD (END STAGE RENAL DISEASE) ON DIALYSIS: ICD-10-CM

## 2019-10-02 PROCEDURE — 99215 PR OFFICE/OUTPT VISIT, EST, LEVL V, 40-54 MIN: ICD-10-PCS | Mod: S$PBB,,, | Performed by: FAMILY MEDICINE

## 2019-10-02 PROCEDURE — 99999 PR PBB SHADOW E&M-EST. PATIENT-LVL III: ICD-10-PCS | Mod: PBBFAC,,, | Performed by: FAMILY MEDICINE

## 2019-10-02 PROCEDURE — 99213 OFFICE O/P EST LOW 20 MIN: CPT | Mod: PBBFAC | Performed by: FAMILY MEDICINE

## 2019-10-02 PROCEDURE — 99999 PR PBB SHADOW E&M-EST. PATIENT-LVL III: CPT | Mod: PBBFAC,,, | Performed by: FAMILY MEDICINE

## 2019-10-02 PROCEDURE — 99215 OFFICE O/P EST HI 40 MIN: CPT | Mod: S$PBB,,, | Performed by: FAMILY MEDICINE

## 2019-10-02 NOTE — PROGRESS NOTES
Isidro White Jr.  10/02/2019  [id number]    Miguel Campo MD  Patient Care Team:  Miguel Campo MD as PCP - General (Family Medicine)  Has the patient seen any provider outside of the [facility] network since the last visit? (no). If yes, HIPPA forms completed and records requested.      Chief Complaint:  Chief Complaint   Patient presents with    Follow-up       History of Present Illness:  Patient is a 49-year-old male with chronic hepatitis B, end-stage renal disease on hemodialysis, CHF, AFib, hypertensive cardiomyopathy, and anemia chronic disease who presents to clinic today with concerns of erectile dysfunction.  States he has had this for several years.  States started after he was placed on dialysis and started suffering from heart failure.  Patient states that he would like to be seen by a specialist because he does not want to be on any medications that may harm his kidneys are heart.  Patient states that he is close to being the top of the transplant list so does not want to do anything that would interfere with that, but wants to know if there is anything that would be safe to do that could help with his erectile dysfunction.    Follow-up   Associated symptoms include myalgias. Pertinent negatives include no chest pain, chills, coughing, fever, headaches, nausea, rash, vomiting or weakness.           History:  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     HD M, W, F    ESRD on dialysis     Hepatitis B     Hyperlipidemia     Hypertension     Secondary hyperparathyroidism of renal origin     Supraventricular tachycardia     atrial tachycardia    Tachycardia     Valvular regurgitation     AI, TR     Past Surgical History:   Procedure Laterality Date    ASD REPAIR       age 8 [facility]    AV Graft Creation Left 03/2017    CARD[facility]AC [facility]HETERIZATION      [facility]     CARDIAC VALVE SURGERY  02/08/2017    22 mm Medtronic AV, 28 mm TV Medtronic annuloplaty ring    COLONOSCOPY N/A 8/27/2019    Procedure: COLONOSCOPY;  Surgeon: Addie John MD;  Location: [facility] ENDO;  Service: Endoscopy;  Laterality: N/A;  dialysis pt *needs K*    ESOPHAGOGASTRODUODENOSCOPY N/A 8/27/2019    Procedure: EGD (ESOPHAGOGASTRODUODENOSCOPY);  Surgeon: Addie John MD;  Location: [facility] ENDO;  Service: Endoscopy;  Laterality: N/A;    RADIOFREQUENCY ABLATION  03/13/2017    Atrial tachycardia     Family History   Problem Relation Age of Onset    Leukemia Mother     Heart attack Father         massive MI at age 67    No Known Problems Sister     No Known Problems Brother     No Known Problems Sister     No Known Problems Sister     Heart failure Paternal Grandmother     Diabetes Paternal Aunt     Hypertension Paternal Aunt     Leukemia Paternal Aunt         CLL    Suicide Paternal Uncle     Anesthesia problems Paternal Uncle     Diabetes Paternal Aunt     Stroke Paternal Aunt     Valvular heart disease Maternal Aunt     Kidney disease Neg Hx     Colon cancer Neg Hx      Social History     Socioeconomic History    Marital status: Single     Spouse name: Not on file    Number of children: Not on file    Years of education: Not on file    Highest education level: Not on file   Occupational History    Not on file   Social Needs    Financial resource strain: Not on file    Food insecurity:     Worry: Not on file     Inability: Not on file    Transportation needs:     Medical: Not on file     Non-medical: Not on file   Tobacco Use    Smoking status: Never Smoker    Smokeless tobacco: Never Used   Substance and Sexual Activity    Alcohol use: No     Frequency: Never     Comment: quit in 2016; does have heavy drinking history; started drinking at age 13; was drinking a 12 pack  over the weekend and two 24 ounces of beer a day during the week along with a pint of hard alcohol    Drug use: No    Sexual activity: Not on file   Lifestyle    Physical activity:     Days per week: Not on file     Minutes per session: Not on file    Stress: Not on file   Relationships    Social connections:     Talks on phone: Not on file     Gets together: Not on file     Attends Christianity service: Not on file     Active member of club or organization: Not on file     Attends meetings of clubs or organizations: Not on file     Relationship status: Not on file   Other Topics Concern    Not on file   Social History Narrative    Not on file     Patient Active Problem List   Diagnosis    Essential hypertension    Hypertensive cardiomegaly with heart failure    Pulmonary HTN    Nonrheumatic aortic valve stenosis    Adult congenital heart disease    Hyperglycemia    S/P AVR (aortic valve replacement)    Postprocedural hypotension    ESRD (end stage renal disease) on dialysis    Pleural effusion    Severe protein-calorie malnutrition    Anemia of chronic disease    Chronic diastolic heart failure    Atrial tachycardia    History of radiofrequency ablation for complex right atrial arrhythmia    Drug-induced erectile dysfunction    Chronic pulmonary heart disease    Secondary hyperparathyroidism of renal origin    Chronic viral hepatitis B without delta agent and without coma    Pneumonia    Atrial fibrillation    Anticoagulated    Encounter for colorectal cancer screening    Colon cancer screening    Special screening for malignant neoplasms, colon     Review of patient's allergies indicates:  No Known Allergies    The following were reviewed at this visit: active problem list, medication list, allergies, family history, social history, and health maintenance.    Medications:  Current Outpatient Medications on File Prior to Visit   Medication Sig Dispense Refill    amLODIPine (NORVASC) 10  MG tablet TAKE ONE TABLET BY MOUTH EVERY DAY 30 tablet 11    ascorbic acid, vitamin C, (VITAMIN C) 500 MG tablet Take 1 tablet (500 mg total) by mouth 2 (two) times daily.      b complex vitamins tablet Take 1 tablet by mouth once daily.      calcium acetate (PHOSLO) 667 mg capsule Take 1 capsule (667 mg total) by mouth 3 (three) times daily. 90 capsule 11    diltiaZEM (CARDIZEM CD) 360 MG 24 hr capsule Take 1 capsule (360 mg total) by mouth once daily. 90 capsule 1    epoetin ivkki (PROCRIT) 10,000 unit/mL injection Inject 1 mL (10,000 Units total) into the skin every Mon, Wed, Fri. To be given with HD      fish oil-omega-3 fatty acids 300-1,000 mg capsule Take 2 g by mouth once daily.      furosemide (LASIX) 80 MG tablet TAKE ONE TABLET BY MOUTH EVERY DAY (Patient taking differently: TAKE ONE TABLET BY MOUTH EVERY DAY per pt BID 6/18/19) 30 tablet 9    hydrALAZINE (APRESOLINE) 100 MG tablet Take 100 mg by mouth 3 (three) times daily.      hydrOXYzine pamoate (VISTARIL) 25 MG Cap TAKE ONE CAPSULE BY MOUTH EVERY DAY AS NEEDED FOR ITCHING 30 capsule 2    labetalol (NORMODYNE) 100 MG tablet TAKE ONE TABLET BY MOUTH THREE TIMES DAILY AS NEEDED IF HEART RATE GREATER THAN 120 90 tablet 3    lisinopril (PRINIVIL,ZESTRIL) 40 MG tablet TAKE ONE TABLET BY MOUTH EVERY DAY 30 tablet 9    multivitamin with minerals tablet Take 1 tablet by mouth once daily.      pantoprazole (PROTONIX) 40 MG tablet TAKE ONE TABLET BY MOUTH EVERY DAY 30 tablet 9    ETHAN-RACQUEL RX 1- mg-mg-mcg Tab TAKE ONE TABLET BY MOUTH EVERY DAY 90 tablet 2    tenofovir (VIREAD) 300 mg Tab Take 1 tablet (300 mg total) by mouth once a week. AFTER DIALYSIS 12 tablet 3    vitamin D (VITAMIN D3) 1000 units Tab Take 1,000 Units by mouth 3 (three) times a week.      warfarin (COUMADIN) 7.5 MG tablet Take 2 tablets by mouth on Thursdays and 1 tablet on all other days of the week. 35 tablet 1    enoxaparin (LOVENOX) 100 mg/mL Syrg Inject 0.9 mLs  (90 mg total) into the skin once daily. (Patient not taking: Reported on 10/2/2019) 5 mL 0    omega-3 fatty acids-vitamin E (FISH OIL) 1,000 mg Cap Take 1 capsule by mouth once daily.  0    zolpidem (AMBIEN) 5 MG Tab TAKE ONE TABLET BY MOUTH EACH EVENING AT BEDTIME AS NEEDED FOR INSOMNIA (Patient not taking: Reported on 10/2/2019) 30 tablet 4     Current Facility-Administered Medications on File Prior to Visit   Medication Dose Route Frequency Provider Last Rate Last Dose    0.9%  NaCl infusion   Intravenous Continuous Christopher Jane MD        sodium chloride 0.9% flush 10 mL  10 mL Intravenous PRN Christopher Jane MD           Medications have been reviewed and reconciled with patient at this visit.  Barriers to medications present (no)    Adverse reactions to current medications (no)    Over the counter medications reviewed (Yes ), and if needed added to active Medication list at this visit.     Exam:  Wt Readings from Last 3 Encounters:   10/02/19 99.2 kg (218 lb 11.1 oz)   09/20/19 100.1 kg (220 lb 11.2 oz)   09/19/19 99.1 kg (218 lb 7.6 oz)     Temp Readings from Last 3 Encounters:   10/02/19 96.3 °F (35.7 °C) (Tympanic)   09/20/19 96.1 °F (35.6 °C) (Tympanic)   08/27/19 98.6 °F (37 °C) (Temporal)     BP Readings from Last 3 Encounters:   10/02/19 130/84   09/20/19 128/76   09/19/19 130/80     Pulse Readings from Last 3 Encounters:   10/02/19 86   09/20/19 74   09/19/19 84     Body mass index is 28.08 kg/m².      Review of Systems   Constitutional: Positive for malaise/fatigue. Negative for chills and fever.   HENT: Negative for hearing loss.    Eyes: Negative for redness.   Respiratory: Negative for cough and shortness of breath.    Cardiovascular: Negative for chest pain and leg swelling.   Gastrointestinal: Negative for nausea and vomiting.   Musculoskeletal: Positive for myalgias. Negative for back pain, falls and joint pain.   Skin: Negative for rash.   Neurological: Negative for tingling, sensory  change, focal weakness, weakness and headaches.     Physical Exam   Constitutional: He is oriented to person, place, and time. He appears well-developed and well-nourished. No distress.   HENT:   Head: Normocephalic and atraumatic.   Eyes: Conjunctivae are normal. No scleral icterus.   Cardiovascular: Normal rate, regular rhythm and normal heart sounds. Exam reveals no gallop and no friction rub.   No murmur heard.  Pulmonary/Chest: Effort normal and breath sounds normal. No respiratory distress. He has no wheezes. He has no rales.   Neurological: He is alert and oriented to person, place, and time.   Skin: Skin is warm and dry. He is not diaphoretic.   Psychiatric: He has a normal mood and affect.   Nursing note and vitals reviewed.      Laboratory Reviewed ({Yes)  Lab Results   Component Value Date    WBC 6.19 05/01/2019    HGB 8.0 (L) 05/01/2019    HCT 23.8 (L) 05/01/2019    PLT 90 (L) 05/01/2019    CHOL 213 (H) 01/08/2019    TRIG 103 01/08/2019    HDL 50 01/08/2019    ALT 24 05/21/2019    AST 22 05/21/2019     05/21/2019    K 5.6 (H) 08/27/2019    CL 98 05/21/2019    CREATININE 13.1 (H) 05/21/2019    BUN 55 (H) 05/21/2019    CO2 27 05/21/2019    PSA 0.62 01/08/2019    INR 2.7 09/26/2019    HGBA1C 4.8 09/20/2019       Isidro was seen today for follow-up.    Diagnoses and all orders for this visit:    Erectile dysfunction due to diseases classified elsewhere  -     Ambulatory Referral to Urology    Drug-induced erectile dysfunction  -     Ambulatory Referral to Urology    ESRD (end stage renal disease) on dialysis  -     Ambulatory Referral to Urology    Chronic diastolic heart failure  -     Ambulatory Referral to Urology    Hypertensive cardiomegaly with heart failure  -     Ambulatory Referral to Urology    Chronic pulmonary heart disease  -     Ambulatory Referral to Urology    Chronic viral hepatitis B without delta agent and without coma  -     Ambulatory Referral to Urology    Pulmonary HTN  -      Ambulatory Referral to Urology     Erectile dysfunction:  -will refer to Urology for further evaluation    ESRD on dialysis:  -follow up with dialysis and Nephrology as instructed    CHF:  -no evidence of volume overload today  -follow up with Cardiology as instructed    -Patient's lab results were reviewed and discussed with patient   -Treatment options and alternatives were discussed with the patient. Patient expressed understanding. Patient was given the opportunity to ask questions and be an active participant in their medical care. Patient had no further questions or concerns at this time.   -Patient is an overall high risk for health complications from their medical conditions.     Follow up: Follow up if symptoms worsen or fail to improve.    After visit summary was printed and given to patient upon discharge today.  Patient goals and care plan are included in After Visit Summary.

## 2019-10-04 RX ORDER — LABETALOL 100 MG/1
TABLET, FILM COATED ORAL
Qty: 90 TABLET | Refills: 3 | Status: SHIPPED | OUTPATIENT
Start: 2019-10-04 | End: 2020-11-05 | Stop reason: SDUPTHER

## 2019-10-04 RX ORDER — LISINOPRIL 40 MG/1
TABLET ORAL
Qty: 30 TABLET | Refills: 9 | Status: SHIPPED | OUTPATIENT
Start: 2019-10-04 | End: 2020-10-02 | Stop reason: SDUPTHER

## 2019-10-10 ENCOUNTER — ANTI-COAG VISIT (OUTPATIENT)
Dept: CARDIOLOGY | Facility: CLINIC | Age: 49
End: 2019-10-10
Payer: MEDICARE

## 2019-10-10 DIAGNOSIS — Z79.01 LONG TERM (CURRENT) USE OF ANTICOAGULANTS: Primary | ICD-10-CM

## 2019-10-10 DIAGNOSIS — I48.91 ATRIAL FIBRILLATION, UNSPECIFIED TYPE: ICD-10-CM

## 2019-10-10 LAB — INR PPP: 1.3 (ref 2–3)

## 2019-10-10 PROCEDURE — 85610 PROTHROMBIN TIME: CPT | Mod: PBBFAC

## 2019-10-10 PROCEDURE — 93793 ANTICOAG MGMT PT WARFARIN: CPT | Mod: ,,,

## 2019-10-10 PROCEDURE — 93793 PR ANTICOAGULANT MGMT FOR PT TAKING WARFARIN: ICD-10-PCS | Mod: ,,,

## 2019-10-10 RX ORDER — WARFARIN 7.5 MG/1
TABLET ORAL
Qty: 35 TABLET | Refills: 2 | Status: SHIPPED | OUTPATIENT
Start: 2019-10-10 | End: 2020-02-04 | Stop reason: SDUPTHER

## 2019-10-10 NOTE — PROGRESS NOTES
Patient's INR is sub-therapeutic at 1.3.  Mr. White was seen in the emergency department on 10/05 due to bleeding at his port site.  He left without being evaluated after area was cleaned and bandaged.  No missed doses reported although patient requests a refill on today.  No abnormal pain, swelling or weakness noted.  Instructions given for patient to take warfarin 15mg on today and tomorrow (boosted dose); then resume current dose of warfarin 15mg on Thursdays and 7.5mg on all other days of the week.  Patient voiced understanding.  Follow-up on Tuesday, 10/15/19.

## 2019-10-15 ENCOUNTER — CLINICAL SUPPORT (OUTPATIENT)
Dept: PULMONOLOGY | Facility: CLINIC | Age: 49
End: 2019-10-15
Payer: MEDICARE

## 2019-10-15 ENCOUNTER — ANTI-COAG VISIT (OUTPATIENT)
Dept: CARDIOLOGY | Facility: CLINIC | Age: 49
End: 2019-10-15
Payer: MEDICARE

## 2019-10-15 VITALS — WEIGHT: 220.25 LBS | BODY MASS INDEX: 29.19 KG/M2 | HEIGHT: 73 IN

## 2019-10-15 DIAGNOSIS — I27.20 PULMONARY HTN: ICD-10-CM

## 2019-10-15 DIAGNOSIS — I48.91 ATRIAL FIBRILLATION, UNSPECIFIED TYPE: ICD-10-CM

## 2019-10-15 DIAGNOSIS — I50.32 CHRONIC DIASTOLIC HEART FAILURE: ICD-10-CM

## 2019-10-15 DIAGNOSIS — R76.8 HISTONE ANTIBODY POSITIVE: ICD-10-CM

## 2019-10-15 DIAGNOSIS — Z79.01 LONG TERM (CURRENT) USE OF ANTICOAGULANTS: Primary | ICD-10-CM

## 2019-10-15 DIAGNOSIS — G47.33 OSA (OBSTRUCTIVE SLEEP APNEA): ICD-10-CM

## 2019-10-15 DIAGNOSIS — I11.0 HYPERTENSIVE CARDIOMEGALY WITH HEART FAILURE: ICD-10-CM

## 2019-10-15 DIAGNOSIS — Z95.2 S/P AVR (AORTIC VALVE REPLACEMENT): ICD-10-CM

## 2019-10-15 DIAGNOSIS — R91.8 BILATERAL PULMONARY INFILTRATES ON CXR: ICD-10-CM

## 2019-10-15 LAB
ALLENS TEST: ABNORMAL
DELSYS: ABNORMAL
FIO2: 21
HCO3 UR-SCNC: 29.5 MMOL/L (ref 24–28)
INR PPP: 2.1 (ref 2–3)
MODE: ABNORMAL
PCO2 BLDA: 38.8 MMHG (ref 35–45)
PH SMN: 7.49 [PH] (ref 7.35–7.45)
PO2 BLDA: 88 MMHG (ref 80–100)
POC BE: 6 MMOL/L
POC SATURATED O2: 97 % (ref 95–100)
SAMPLE: ABNORMAL
SITE: ABNORMAL

## 2019-10-15 PROCEDURE — 36600 WITHDRAWAL OF ARTERIAL BLOOD: CPT | Mod: 59,S$PBB,NTX, | Performed by: INTERNAL MEDICINE

## 2019-10-15 PROCEDURE — 94729 DIFFUSING CAPACITY: CPT | Mod: 26,S$PBB,NTX, | Performed by: INTERNAL MEDICINE

## 2019-10-15 PROCEDURE — 94010 BREATHING CAPACITY TEST: CPT | Mod: PBBFAC,NTX

## 2019-10-15 PROCEDURE — 99211 OFF/OP EST MAY X REQ PHY/QHP: CPT | Mod: PBBFAC,TXP,25

## 2019-10-15 PROCEDURE — 94010 BREATHING CAPACITY TEST: ICD-10-PCS | Mod: 26,59,S$PBB,NTX | Performed by: INTERNAL MEDICINE

## 2019-10-15 PROCEDURE — 93793 ANTICOAG MGMT PT WARFARIN: CPT | Mod: ,,,

## 2019-10-15 PROCEDURE — 94726 PLETHYSMOGRAPHY LUNG VOLUMES: CPT | Mod: PBBFAC,NTX

## 2019-10-15 PROCEDURE — 82803 BLOOD GASES ANY COMBINATION: CPT | Mod: PBBFAC,NTX

## 2019-10-15 PROCEDURE — 99999 PR PBB SHADOW E&M-EST. PATIENT-LVL I: CPT | Mod: PBBFAC,TXP,,

## 2019-10-15 PROCEDURE — 94618 PULMONARY STRESS TESTING: ICD-10-PCS | Mod: 26,S$PBB,NTX, | Performed by: INTERNAL MEDICINE

## 2019-10-15 PROCEDURE — 94726 PLETHYSMOGRAPHY LUNG VOLUMES: CPT | Mod: 26,S$PBB,NTX, | Performed by: INTERNAL MEDICINE

## 2019-10-15 PROCEDURE — 36600 WITHDRAWAL OF ARTERIAL BLOOD: CPT | Mod: PBBFAC,NTX

## 2019-10-15 PROCEDURE — 94618 PULMONARY STRESS TESTING: CPT | Mod: 26,S$PBB,NTX, | Performed by: INTERNAL MEDICINE

## 2019-10-15 PROCEDURE — 94729 PR C02/MEMBANE DIFFUSE CAPACITY: ICD-10-PCS | Mod: 26,S$PBB,NTX, | Performed by: INTERNAL MEDICINE

## 2019-10-15 PROCEDURE — 94010 BREATHING CAPACITY TEST: CPT | Mod: 26,59,S$PBB,NTX | Performed by: INTERNAL MEDICINE

## 2019-10-15 PROCEDURE — 36600 PR WITHDRAWAL OF ARTERIAL BLOOD: ICD-10-PCS | Mod: 59,S$PBB,NTX, | Performed by: INTERNAL MEDICINE

## 2019-10-15 PROCEDURE — 94729 DIFFUSING CAPACITY: CPT | Mod: PBBFAC,NTX

## 2019-10-15 PROCEDURE — 85610 PROTHROMBIN TIME: CPT | Mod: PBBFAC

## 2019-10-15 PROCEDURE — 94618 PULMONARY STRESS TESTING: CPT | Mod: PBBFAC,NTX

## 2019-10-15 PROCEDURE — 93793 PR ANTICOAGULANT MGMT FOR PT TAKING WARFARIN: ICD-10-PCS | Mod: ,,,

## 2019-10-15 PROCEDURE — 94726 PULM FUNCT TST PLETHYSMOGRAP: ICD-10-PCS | Mod: 26,S$PBB,NTX, | Performed by: INTERNAL MEDICINE

## 2019-10-15 PROCEDURE — 99999 PR PBB SHADOW E&M-EST. PATIENT-LVL I: ICD-10-PCS | Mod: PBBFAC,TXP,,

## 2019-10-15 NOTE — PROGRESS NOTES
Patient's INR is therapeutic at 2.1. Mr. White reports he missed 2 doses of his warfarin because he ran out of his medications, so he took a double dose on last night.  Patient advised to  medications a few days before he runs out and agrees to contact coumadin clinic before self-adjusting in the future.  No changes in dose at this.  Continue current dose of warfarin 15mg on Thursdays and 7.5mg on all other days of the week.  Patient voiced understanding.   Recheck in 2 weeks.  Please call should you have any questions or concerns at [phone] or [phone].

## 2019-10-15 NOTE — PROCEDURES
"[facility] - Pulm Function [facility]  Six Minute Walk     SUMMARY     Ordering Provider: Dr Urbina   Interpreting Provider: Dr Urbina  Performing nurse/tech/RT: AMARIS King RRT  Diagnosis: Pulmonary Hypertension  Height: 6' 1" (185.4 cm)  Weight: 99.9 kg (220 lb 3.8 oz)  BMI (Calculated): 29.1   Patient Race:             Phase Oxygen Assessment Supplemental O2 Heart   Rate Blood Pressure Ruel Dyspnea Scale Rating   Resting 100 % Room Air 80 bpm 113/72 0   Exercise        Minute        1 97 % Room Air 94 bpm     2 93 % Room Air 97 bpm     3 93 % Room Air 97 bpm     4 92 % Room Air 100 bpm     5 91 % Room Air 101 bpm     6  93 % Room Air 100 bpm 132/72 4   Recovery        Minute        1 99 % Room Air 87 bpm     2 99 % Room Air 86 bpm     3 100 % Room Air 85 bpm     4 99 % Room Air 85 bpm 126/69 0     Six Minute Walk Summary  6MWT Status: completed without stopping  Patient Reported: Leg pain     Interpretation:  Did the patient stop or pause?: No         Total Time Walked (Calculated): 360 seconds  Final Partial Lap Distance (feet): 75 feet  Total Distance Meters (Calculated): 449.58 meters   LLN was 524.69m  Predicted Distance Meters (Calculated): 677.69 meters  Percentage of Predicted (Calculated): 66.34  Peak VO2 (Calculated): 17.47  Mets: 4.99  Has The Patient Had a Previous Six Minute Walk Test?: Yes       Previous 6MWT Results  Has The Patient Had a Previous Six Minute Walk Test?: Yes  Date of Previous Test: 09/12/17  Total Time Walked: (not all info shown)  Total Distance (meters): 419.1  Predicted Distance (meters): (not shown)  Percentage of Predicted: (not shown)  Percent Change (Calculated): -0.07         CLINICAL INTERPRETATION:  Six minute walk distance is 449.58m (677.69  % predicted) with moderate dyspnea.  During exercise, there was MODERATE desaturation while breathing room air.  Blood pressure remained stable and Heart rate remained stable with walking.  The patient reported non-pulmonary " symptoms during exercise.  LEG PAIN, CLINICAL CORRELATION  MILD exercise impairment is likely due to subjective symptoms.  The patient did complete the study, walking 360 seconds of the 360 second test.  The patient may benefit from evaluation of leg pain  Since the previous study in 09/12/2017, exercise capacity may be somewhat improved.  Based upon age and body mass index, exercise capacity is less than predicted.

## 2019-10-16 LAB
BRPFT: ABNORMAL
DLCO ADJ PRE: 14.76 ML/(MIN*MMHG) (ref 26.98–40.84)
DLCO SINGLE BREATH LLN: 26.98
DLCO SINGLE BREATH PRE REF: 43.5 %
DLCO SINGLE BREATH REF: 33.91
DLCOC SBVA LLN: 3.29
DLCOC SBVA PRE REF: 73.7 %
DLCOC SBVA REF: 4.4
DLCOC SINGLE BREATH LLN: 26.98
DLCOC SINGLE BREATH PRE REF: 43.5 %
DLCOC SINGLE BREATH REF: 33.91
DLCOVA LLN: 3.29
DLCOVA PRE REF: 73.7 %
DLCOVA PRE: 3.24 ML/(MIN*MMHG*L) (ref 3.29–5.52)
DLCOVA REF: 4.4
DLVAADJ PRE: 3.24 ML/(MIN*MMHG*L) (ref 3.29–5.52)
ERV LLN: 1.42
ERV PRE REF: 49.2 %
ERV REF: 1.42
FEF 25 75 LLN: 2.7
FEF 25 75 PRE REF: 24.1 %
FEF 25 75 REF: 4.97
FEV1 FVC LLN: 69
FEV1 FVC PRE REF: 85.3 %
FEV1 FVC REF: 79
FEV1 LLN: 2.8
FEV1 PRE REF: 65.6 %
FEV1 REF: 3.7
FRCPLETH LLN: 2.68
FRCPLETH PREREF: 81.9 %
FRCPLETH REF: 3.67
FVC LLN: 3.58
FVC PRE REF: 76.6 %
FVC REF: 4.67
IVC PRE: 3.01 L (ref 3.58–5.77)
IVC SINGLE BREATH LLN: 3.58
IVC SINGLE BREATH PRE REF: 64.3 %
IVC SINGLE BREATH REF: 4.67
MVV LLN: 138
MVV PRE REF: 66.9 %
MVV REF: 163
PEF LLN: 6.75
PEF PRE REF: 82.4 %
PEF REF: 9.62
PRE DLCO: 14.76 ML/(MIN*MMHG) (ref 26.98–40.84)
PRE ERV: 0.7 L (ref 1.42–1.42)
PRE FEF 25 75: 1.2 L/S (ref 2.7–7.24)
PRE FET 100: 12.1 SEC
PRE FEV1 FVC: 67.78 % (ref 69.02–89.8)
PRE FEV1: 2.42 L (ref 2.8–4.6)
PRE FRC PL: 3 L
PRE FVC: 3.58 L (ref 3.58–5.77)
PRE MVV: 109 L/MIN (ref 138.48–187.36)
PRE PEF: 7.93 L/S (ref 6.75–12.49)
PRE RV: 1.98 L (ref 1.58–2.92)
PRE TLC: 5.55 L (ref 6.55–8.85)
RAW LLN: 3.06
RAW PRE REF: 81.1 %
RAW PRE: 2.48 CMH2O*S/L (ref 3.06–3.06)
RAW REF: 3.06
RV LLN: 1.58
RV PRE REF: 87.9 %
RV REF: 2.25
RVTLC LLN: 24
RVTLC PRE REF: 108.9 %
RVTLC PRE: 35.59 % (ref 23.7–41.66)
RVTLC REF: 33
TLC LLN: 6.55
TLC PRE REF: 72.1 %
TLC REF: 7.7
VA PRE: 4.55 L (ref 7.55–7.55)
VA SINGLE BREATH LLN: 7.55
VA SINGLE BREATH PRE REF: 60.3 %
VA SINGLE BREATH REF: 7.55
VC LLN: 3.58
VC PRE REF: 76.6 %
VC PRE: 3.58 L (ref 3.58–5.77)
VC REF: 4.67
VTGRAWPRE: 3.14 L

## 2019-10-23 ENCOUNTER — TELEPHONE (OUTPATIENT)
Dept: PULMONOLOGY | Facility: CLINIC | Age: 49
End: 2019-10-23

## 2019-10-23 NOTE — TELEPHONE ENCOUNTER
Future Appointments   Date Time Provider Department Center   10/29/2019 10:15 AM [facility]MADIN, [facility] COU [facility]   10/29/2019 10:40 AM Hima Urbina MD [facility] PULMSVC BR [facility]   12/5/2019 10:00 AM [facility] US1 [facility] ULSOUND [place]   12/5/2019 11:00 AM LABORATORY, [facility] LAB [place]   12/20/2019  8:20 AM Miguel Campo MD [facility] IM  Medical C   3/10/2020  9:00 AM [facility] NM1 [facility] NUCLEAR [place]   3/10/2020  1:00 PM [facility] NM1 [facility] NUCLEAR [place]   3/10/2020  1:30 PM ECHO, INPATIENT [facility] IP ECHO [place]   3/19/2020 11:40 AM Fuad Gudino MD [facility] CARDIO  Medical C

## 2019-10-23 NOTE — TELEPHONE ENCOUNTER
Patient opted to reschedule original appointment with Dr. Urbina (10/29/19) due to book out as per below:    Future Appointments   Date Time Provider Department Center   10/29/2019 10:15 AM COUMADIN, [facility] COU [facility]   10/31/2019  9:00 AM Coco Garrett NP [facility] PULMSVC [facility]   12/5/2019 10:00 AM [facility] US1 [facility] ULSOUND [place]   12/5/2019 11:00 AM LABORATORY, [facility] LAB [place]   12/20/2019  8:20 AM Miguel Campo MD [facility] IM BR [facility]   3/10/2020  9:00 [facility] [place]   3/10/2020  1:00 PM [facility] [place]   3/10/2020  1:30 PM ECHO, INPATIENT [facility] IP ECHO [place]   3/19/2020 11:40 AM Fuad Gudino MD ON CARDIO BR Medical C     Patient verbalized understanding of appointment date, time, and clinic location.

## 2019-10-29 ENCOUNTER — ANTI-COAG VISIT (OUTPATIENT)
Dept: CARDIOLOGY | Facility: CLINIC | Age: 49
End: 2019-10-29
Payer: MEDICARE

## 2019-10-29 DIAGNOSIS — I48.91 ATRIAL FIBRILLATION, UNSPECIFIED TYPE: ICD-10-CM

## 2019-10-29 DIAGNOSIS — Z79.01 LONG TERM (CURRENT) USE OF ANTICOAGULANTS: Primary | ICD-10-CM

## 2019-10-29 DIAGNOSIS — Z95.2 S/P AVR (AORTIC VALVE REPLACEMENT): ICD-10-CM

## 2019-10-29 LAB — INR PPP: 3.3 (ref 2–3)

## 2019-10-29 PROCEDURE — 85610 PROTHROMBIN TIME: CPT | Mod: PBBFAC

## 2019-10-29 PROCEDURE — 93793 ANTICOAG MGMT PT WARFARIN: CPT | Mod: ,,,

## 2019-10-29 PROCEDURE — 93793 PR ANTICOAGULANT MGMT FOR PT TAKING WARFARIN: ICD-10-PCS | Mod: ,,,

## 2019-10-29 NOTE — PROGRESS NOTES
Patient's INR is supra-therapeutic at 3.3.  Mr. White usually eats 1 serving of a vegetable per week, but he has not had any vegetables this past week. Consistency discussed, patient agrees to resume usual diet.   No signs of bleeding noted; advised patient to seek immediate medical attention if he notices any abnormal bleeding.  Instructions given for patient to hold dose of warfarin on today; then resume current dose of warfarin 15mg on Thursdays and 7.5mg on all other days of the week.  Patient voiced understanding.  Recheck in 2 weeks.

## 2019-11-12 ENCOUNTER — ANTI-COAG VISIT (OUTPATIENT)
Dept: CARDIOLOGY | Facility: CLINIC | Age: 49
End: 2019-11-12
Payer: MEDICARE

## 2019-11-12 ENCOUNTER — OFFICE VISIT (OUTPATIENT)
Dept: PULMONOLOGY | Facility: CLINIC | Age: 49
End: 2019-11-12
Payer: MEDICARE

## 2019-11-12 VITALS
RESPIRATION RATE: 18 BRPM | OXYGEN SATURATION: 98 % | BODY MASS INDEX: 29.8 KG/M2 | HEIGHT: 73 IN | DIASTOLIC BLOOD PRESSURE: 82 MMHG | HEART RATE: 87 BPM | WEIGHT: 224.88 LBS | SYSTOLIC BLOOD PRESSURE: 132 MMHG

## 2019-11-12 DIAGNOSIS — Z79.01 LONG TERM (CURRENT) USE OF ANTICOAGULANTS: Primary | ICD-10-CM

## 2019-11-12 DIAGNOSIS — J44.89 NOCTURNAL HYPOXEMIA DUE TO OBSTRUCTIVE CHRONIC BRONCHITIS: ICD-10-CM

## 2019-11-12 DIAGNOSIS — G47.33 OSA (OBSTRUCTIVE SLEEP APNEA): ICD-10-CM

## 2019-11-12 DIAGNOSIS — I10 ESSENTIAL HYPERTENSION: ICD-10-CM

## 2019-11-12 DIAGNOSIS — I50.32 CHRONIC DIASTOLIC HEART FAILURE: ICD-10-CM

## 2019-11-12 DIAGNOSIS — Z95.2 S/P AVR (AORTIC VALVE REPLACEMENT): ICD-10-CM

## 2019-11-12 DIAGNOSIS — N18.6 ESRD (END STAGE RENAL DISEASE) ON DIALYSIS: Chronic | ICD-10-CM

## 2019-11-12 DIAGNOSIS — R91.1 PULMONARY NODULE, LEFT: ICD-10-CM

## 2019-11-12 DIAGNOSIS — I48.91 ATRIAL FIBRILLATION, UNSPECIFIED TYPE: ICD-10-CM

## 2019-11-12 DIAGNOSIS — J98.4 MIXED RESTRICTIVE AND OBSTRUCTIVE LUNG DISEASE: Primary | ICD-10-CM

## 2019-11-12 DIAGNOSIS — J43.9 MIXED RESTRICTIVE AND OBSTRUCTIVE LUNG DISEASE: Primary | ICD-10-CM

## 2019-11-12 DIAGNOSIS — I27.9 CHRONIC PULMONARY HEART DISEASE: ICD-10-CM

## 2019-11-12 DIAGNOSIS — R94.2 ABNORMAL DIFFUSION CAPACITY DETERMINED BY PULMONARY FUNCTION TEST: ICD-10-CM

## 2019-11-12 DIAGNOSIS — G47.36 NOCTURNAL HYPOXEMIA DUE TO OBSTRUCTIVE CHRONIC BRONCHITIS: ICD-10-CM

## 2019-11-12 DIAGNOSIS — Z99.2 ESRD (END STAGE RENAL DISEASE) ON DIALYSIS: Chronic | ICD-10-CM

## 2019-11-12 PROBLEM — J90 PLEURAL EFFUSION: Status: RESOLVED | Noted: 2017-03-09 | Resolved: 2019-11-12

## 2019-11-12 PROBLEM — J18.9 PNEUMONIA: Status: RESOLVED | Noted: 2019-03-27 | Resolved: 2019-11-12

## 2019-11-12 PROBLEM — J44.9 MIXED RESTRICTIVE AND OBSTRUCTIVE LUNG DISEASE: Status: ACTIVE | Noted: 2019-11-12

## 2019-11-12 LAB — INR PPP: 1.9 (ref 2–3)

## 2019-11-12 PROCEDURE — 99214 OFFICE O/P EST MOD 30 MIN: CPT | Mod: S$PBB,NTX,, | Performed by: INTERNAL MEDICINE

## 2019-11-12 PROCEDURE — 99999 PR PBB SHADOW E&M-EST. PATIENT-LVL V: CPT | Mod: PBBFAC,TXP,, | Performed by: INTERNAL MEDICINE

## 2019-11-12 PROCEDURE — 93793 ANTICOAG MGMT PT WARFARIN: CPT | Mod: ,,,

## 2019-11-12 PROCEDURE — 85610 PROTHROMBIN TIME: CPT | Mod: PBBFAC,NTX

## 2019-11-12 PROCEDURE — 99214 PR OFFICE/OUTPT VISIT, EST, LEVL IV, 30-39 MIN: ICD-10-PCS | Mod: S$PBB,NTX,, | Performed by: INTERNAL MEDICINE

## 2019-11-12 PROCEDURE — 99999 PR PBB SHADOW E&M-EST. PATIENT-LVL V: ICD-10-PCS | Mod: PBBFAC,TXP,, | Performed by: INTERNAL MEDICINE

## 2019-11-12 PROCEDURE — 99215 OFFICE O/P EST HI 40 MIN: CPT | Mod: PBBFAC,TXP | Performed by: INTERNAL MEDICINE

## 2019-11-12 PROCEDURE — 93793 PR ANTICOAGULANT MGMT FOR PT TAKING WARFARIN: ICD-10-PCS | Mod: ,,,

## 2019-11-12 RX ORDER — ALBUTEROL SULFATE 90 UG/1
2 AEROSOL, METERED RESPIRATORY (INHALATION) EVERY 6 HOURS PRN
Qty: 18 G | Refills: 3 | Status: SHIPPED | OUTPATIENT
Start: 2019-11-12 | End: 2023-09-26

## 2019-11-12 NOTE — PROGRESS NOTES
Subjective:       Patient ID: Isidro White Jr. is a 49 y.o. male.  Patient Active Problem List   Diagnosis    Essential hypertension    Hypertensive cardiomegaly with heart failure    Pulmonary HTN    Nonrheumatic aortic valve stenosis    Adult congenital heart disease    Hyperglycemia    S/P AVR (aortic valve replacement)    Postprocedural hypotension    ESRD (end stage renal disease) on dialysis    Severe protein-calorie malnutrition    Anemia of chronic disease    Chronic diastolic heart failure    Atrial tachycardia    History of radiofrequency ablation for complex right atrial arrhythmia    Drug-induced erectile dysfunction    Chronic pulmonary heart disease    Secondary hyperparathyroidism of renal origin    Chronic viral hepatitis B without delta agent and without coma    Atrial fibrillation    Anticoagulated    Encounter for colorectal cancer screening    Colon cancer screening    Special screening for malignant neoplasms, colon    Abnormal diffusion capacity determined by pulmonary function test    Mixed restrictive and obstructive lung disease    DANIEL (obstructive sleep apnea)    Nocturnal hypoxemia due to obstructive chronic bronchitis    Pulmonary nodule, left     Immunization History   Administered Date(s) Administered    Influenza - Quadrivalent - PF (6 months and older) 03/28/2019    PPD Test 02/13/2017    Pneumococcal Conjugate - 13 Valent 03/28/2019    Td (ADULT) 09/27/2005    Tdap 01/09/2019, 01/24/2019      Social History     Tobacco Use   Smoking Status Never Smoker   Smokeless Tobacco Never Used      EPWORTH SLEEPINESS SCALE 11/12/2019   Sitting and reading 2   Watching TV 3   Sitting, inactive in a public place (e.g. a theatre or a meeting) 1   As a passenger in a car for an hour without a break 2   Lying down to rest in the afternoon when circumstances permit 1   Sitting and talking to someone 0   Sitting quietly after a lunch without alcohol 0   In a car,  while stopped for a few minutes in traffic 0   Total score 9        Chief Complaint:  Isidro White Jr. is 49 y.o.   This a follow-up appointment to determine pulmonary factors that may be attributable to transplant suitability  When I last saw him he was in the hospital with the respiratory exacerbation  He was discharged on oxygen which is not currently using  He has no cough no wheezing no shortness of breath  He is in current stable respiratory state  Most recent chest CT scan was reviewed shows resolution of his pulmonary parenchymal opacities  He does not wear oxygen at rest or with activity.  His PFTs abnormalities were consistent with restrictive ventilatory defect:  Impaired diffusion capacity  His FEV1 and FVC have however improved since previous study.  Sleep study was nondiagnostic due to insufficient total sleep time  Home sleep study will be attempted  V/Q scan was low probability for venous thromboembolism.  His pulmonary hypertension PA pressure estimated 57 most likely related to use dialysis shunt/diastolic dysfunction  Patient is currently not smoking.  ABGs at rest room air PaO2 was 88 mm Hg  Exercise desaturation cindy was 91%    The following portions of the patient's history were reviewed and updated as appropriate:   He  has a past medical history of Aortic valve stenosis, Atrial fibrillation, Atrial flutter, Cardiomyopathy, CHF (congestive heart failure), Drug-induced erectile dysfunction, ESRD due to hypertension, ESRD on dialysis, Hepatitis B, Hyperlipidemia, Hypertension, DANIEL (obstructive sleep apnea) (11/12/2019), Secondary hyperparathyroidism of renal origin, Supraventricular tachycardia, Tachycardia, and Valvular regurgitation.  He does not have any pertinent problems on file.  He  has a past surgical history that includes ASD repair; Cardiac valuve replacement (02/08/2017); Radiofrequency ablation (03/13/2017); Cardiac catheterization; AV Graft Creation (Left, 03/2017);  Colonoscopy (N/A, 8/27/2019); and Esophagogastroduodenoscopy (N/A, 8/27/2019).  His family history includes Anesthesia problems in his paternal uncle; Diabetes in his paternal aunt and paternal aunt; Heart attack in his father; Heart failure in his paternal grandmother; Hypertension in his paternal aunt; Leukemia in his mother and paternal aunt; No Known Problems in his brother, sister, sister, and sister; Stroke in his paternal aunt; Suicide in his paternal uncle; Valvular heart disease in his maternal aunt.  He  reports that he has never smoked. He has never used smokeless tobacco. He reports that he does not drink alcohol or use drugs.  He has a current medication list which includes the following prescription(s): amlodipine, ascorbic acid (vitamin c), b complex vitamins, calcium acetate, diltiazem, enoxaparin, epoetin vikki, fish oil-omega-3 fatty acids, furosemide, hydralazine, hydroxyzine pamoate, labetalol, lisinopril, multivitamin with minerals, pantoprazole, shawna-nataly rx, tenofovir, vitamin d, warfarin, zolpidem, albuterol, and omega-3 fatty acids-vitamin e, and the following Facility-Administered Medications: sodium chloride 0.9% and sodium chloride 0.9%.  Current Outpatient Medications on File Prior to Visit   Medication Sig Dispense Refill    amLODIPine (NORVASC) 10 MG tablet TAKE ONE TABLET BY MOUTH EVERY DAY 30 tablet 11    ascorbic acid, vitamin C, (VITAMIN C) 500 MG tablet Take 1 tablet (500 mg total) by mouth 2 (two) times daily.      b complex vitamins tablet Take 1 tablet by mouth once daily.      calcium acetate (PHOSLO) 667 mg capsule Take 1 capsule (667 mg total) by mouth 3 (three) times daily. 90 capsule 11    diltiaZEM (CARDIZEM CD) 360 MG 24 hr capsule Take 1 capsule (360 mg total) by mouth once daily. 90 capsule 1    enoxaparin (LOVENOX) 100 mg/mL Syrg Inject 0.9 mLs (90 mg total) into the skin once daily. (Patient not taking: Reported on 11/12/2019) 5 mL 0    epoetin vikki (PROCRIT)  10,000 unit/mL injection Inject 1 mL (10,000 Units total) into the skin every Mon, Wed, Fri. To be given with HD      fish oil-omega-3 fatty acids 300-1,000 mg capsule Take 2 g by mouth once daily.      furosemide (LASIX) 80 MG tablet TAKE ONE TABLET BY MOUTH EVERY DAY (Patient taking differently: TAKE ONE TABLET BY MOUTH EVERY DAY per pt BID 6/18/19) 30 tablet 9    hydrALAZINE (APRESOLINE) 100 MG tablet Take 100 mg by mouth 3 (three) times daily.      hydrOXYzine pamoate (VISTARIL) 25 MG Cap TAKE ONE CAPSULE BY MOUTH EVERY DAY AS NEEDED FOR ITCHING 30 capsule 2    labetalol (NORMODYNE) 100 MG tablet TAKE ONE TABLET BY MOUTH THREE TIMES DAILY AS NEEDED FOR HEART RATE > 120 90 tablet 3    lisinopril (PRINIVIL,ZESTRIL) 40 MG tablet TAKE ONE TABLET BY MOUTH EVERY DAY 30 tablet 9    multivitamin with minerals tablet Take 1 tablet by mouth once daily.      pantoprazole (PROTONIX) 40 MG tablet TAKE ONE TABLET BY MOUTH EVERY DAY 30 tablet 9    ETHAN-RACQUEL RX 1- mg-mg-mcg Tab TAKE ONE TABLET BY MOUTH EVERY DAY 90 tablet 2    tenofovir (VIREAD) 300 mg Tab Take 1 tablet (300 mg total) by mouth once a week. AFTER DIALYSIS 12 tablet 3    vitamin D (VITAMIN D3) 1000 units Tab Take 1,000 Units by mouth 3 (three) times a week.      warfarin (COUMADIN) 7.5 MG tablet Take 2 tablets by mouth on Thursdays and 1 tablet on all other days of the week. 35 tablet 2    zolpidem (AMBIEN) 5 MG Tab TAKE ONE TABLET BY MOUTH EACH EVENING AT BEDTIME AS NEEDED FOR INSOMNIA 30 tablet 4    omega-3 fatty acids-vitamin E (FISH OIL) 1,000 mg Cap Take 1 capsule by mouth once daily.  0     Current Facility-Administered Medications on File Prior to Visit   Medication Dose Route Frequency Provider Last Rate Last Dose    0.9%  NaCl infusion   Intravenous Continuous Christopher Jane MD        sodium chloride 0.9% flush 10 mL  10 mL Intravenous PRN Christopher Jane MD         He has No Known Allergies..    Review of Systems  A comprehensive  "review of systems was negative.      Objective:        Vitals:    19 1019   BP: 132/82   Pulse: 87   Resp: 18   SpO2: 98%   Weight: 102 kg (224 lb 13.9 oz)   Height: 6' 1" (1.854 m)     Physical Exam   Constitutional: He is oriented to person, place, and time. Vital signs are normal. He appears well-developed and well-nourished.       HENT:   Head: Normocephalic and atraumatic.   Nose: Nose normal.   Mouth/Throat: Oropharynx is clear and moist.   Eyes: Pupils are equal, round, and reactive to light. EOM are normal.   Neck: Normal range of motion. Neck supple.   Cardiovascular: Normal rate, regular rhythm and normal heart sounds.   Pulmonary/Chest: Effort normal and breath sounds normal. No stridor. No respiratory distress. He has no wheezes.   Abdominal: Soft.   Musculoskeletal: Normal range of motion. He exhibits no edema.   Neurological: He is alert and oriented to person, place, and time. No cranial nerve deficit.   Skin: Skin is warm and dry. Capillary refill takes 2 to 3 seconds.   Psychiatric: He has a normal mood and affect.   Nursing note and vitals reviewed.        Data Review:   CBC:   Lab Results   Component Value Date    WBC 6.19 2019    RBC 2.25 (L) 2019    HGB 8.0 (L) 2019    HCT 23.8 (L) 2019    HCT 25 (L) 2017    PLT 90 (L) 2019     BMP:   Lab Results   Component Value Date    GLU 87 2019     2019    K 5.6 (H) 2019    CL 98 2019    CO2 27 2019    BUN 55 (H) 2019    CREATININE 13.1 (H) 2019    CALCIUM 10.0 2019     ABGs:   Lab Results   Component Value Date    PH 7.490 (H) 10/15/2019    PO2 88 10/15/2019    PCO2 38.8 10/15/2019     Diagnostics: PSG: done 2019: Total sleep time was 98 mins  AHI was 0.0/hr  TC 88 was 13.3 mins  PLM: 126.0/hr , with arousal 10.0/hr    Pulmonary function tests: FEV1: 2.42  (65.6 % predicted), FVC:  3.58 (76.6 % predicted), FEV1/FVC:  68, T.55 (72.1 % predicted), " "DLCO: 14.76 (43.5 % predicted)  Mild restriction. (TLC< LLN and > or equal to 70% of predicted).   Mild decrease in maximal voluntary ventilation. (MVV % predicted is >65% predicted and less than LLN)   Overall there is moderate ventilatory impairment. (FEV1 > or equal to 60% of predicted and < or equal to 69% predicted ).   Moderate reduction in diffusion capacity - unadjusted for hemoglobin. (DLCO > or equal to 40% and < 60% predicted).   FEV1 and FVC improved since study 2017      V/Q scan performed on 09/10/2019 low probability probability of pulmonary embolic disease.        ONSAT  REPORT SUMMARY  Total valid samplin:20:28   High SpO2: 98%    Low SpO2: 84%    Mean SpO2  90.7 %  Cumulative time with oxygen saturation less than 88% (TC88):   0:56:12    CLINICAL INTERPRETATION   There is  significant nocturnal oxygen desaturation,  Clinical   correlation is advised and  Recommend overnight polysomnography   if clinically indicated    Medicare Criteria Comments:   Oximetry test results suggest the patient falls under Medicare   Group 1 Criteria. ( Arterial oxygen saturation at or below 88%   for at least 5 minutes taken during sleep)  Hima Urbina MD    6MW Test  Height: 6' 1" (185.4 cm)  Weight: 102 kg (224 lb 13.9 oz)  BMI (Calculated): 29.7  Predicted Distance: 487.26  Interpretation  Predicted Distance Meters (Calculated): 674 meters   Total Time Walked (Calculated): 360 seconds  Final Partial Lap Distance (feet): 75 feet  Total Distance Meters (Calculated): 449.58 meters   LLN was 524.69m  Predicted Distance Meters (Calculated): 677.69 meters  Percentage of Predicted (Calculated): 66.34  Peak VO2 (Calculated): 17.47  Mets: 4.99  Has The Patient Had a Previous Six Minute Walk Test?: Yes       Previous 6MWT Results  Has The Patient Had a Previous Six Minute Walk Test?: Yes  Date of Previous Test: 17  Total Time Walked: (not all info shown)  Total Distance (meters): 419.1  Predicted " Distance (meters): (not shown)  Percentage of Predicted: (not shown)  Percent Change (Calculated): -0.07         CLINICAL INTERPRETATION:  Six minute walk distance is 449.58m (677.69  % predicted) with   moderate dyspnea.  During exercise, there was MODERATE desaturation while breathing   room air.  Blood pressure remained stable and Heart rate remained stable   with walking.  The patient reported non-pulmonary symptoms during exercise.  LEG PAIN, CLINICAL CORRELATION  MILD exercise impairment is likely due to subjective symptoms.  The patient did complete the study, walking 360 seconds of the   360 second test.  The patient may benefit from evaluation of leg pain  Since the previous study in 09/12/2017, exercise capacity may be   somewhat improved.  Based upon age and body mass index, exercise capacity is less   than predicted.    CHEST CT      FINDINGS:  No pneumothorax or pleural effusion is demonstrated.  The pattern of tree in bud opacities previously demonstrated have almost completely resolved.  There continues to be some pleural scarring in the right and left lung base is and along the major fissure on the left.  In the left lung base posteriorly there is a small noncalcified 0.5 cm nodule near the pleural surface on series 3, image 263 with some ground-glass type attenuation around it.  This is a new finding.  The lungs have a pattern of hyperexpansion suggesting chronic emphysematous change.    There is atherosclerosis of the aorta postoperative change of the chest with sternal sutures and valve replacement.  There is moderate chronic cardiomegaly.  There is no pericardial effusion.  No enlarged mediastinal, hilar or axillary lymph nodes are identified.    Images the upper abdomen demonstrate multiple right renal cysts.  No acute abnormality is demonstrated.    No suspicious appearing osseus abnormalities are identified.      Impression       There is improvement in the appearance the chest with  significant decrease in the pulmonary interstitial infiltrates seen on previous exam.  There continues to be some mild scarring in lung bases.  There is a 0.5 cm noncalcified pulmonary nodule posteriorly in the left lower lobe.       Assessment:     Problem List Items Addressed This Visit     Chronic diastolic heart failure    Relevant Orders    Ambulatory Referral to Pulmonary Disease Management    ESRD (end stage renal disease) on dialysis (Chronic)     Dialysis Monday Wednesday Friday [facility]         Essential hypertension     Stable amlodipine, diltiazem, lisinopril         Chronic pulmonary heart disease     Multifactorial:  Diastolic dysfunction, related to dialysis shunt         Abnormal diffusion capacity determined by pulmonary function test    Relevant Orders    Ambulatory Referral to Pulmonary Disease Management    Mixed restrictive and obstructive lung disease - Primary    Relevant Medications    albuterol (VENTOLIN HFA) 90 mcg/actuation inhaler    Other Relevant Orders    Ambulatory Referral to Pulmonary Disease Management    DANIEL (obstructive sleep apnea)     In-lab polysomnography was nondiagnostic due to insufficient total sleep time  Repeat home sleep study sequential night study         Relevant Orders    Home Sleep Studies    Nocturnal hypoxemia due to obstructive chronic bronchitis     Has Oxygen   Adherence  Time below 88% was 13.3 min         Pulmonary nodule, left     Follow-up CT scan 12 months         Relevant Orders    CT Chest Without Contrast         Plan:     Requested Prescriptions     Signed Prescriptions Disp Refills    albuterol (VENTOLIN HFA) 90 mcg/actuation inhaler 18 g 3     Sig: Inhale 2 puffs into the lungs every 6 (six) hours as needed for Wheezing or Shortness of Breath. Rescue       Orders Placed This Encounter   Procedures    CT Chest Without Contrast     Standing Status:   Future     Standing Expiration Date:   11/12/2020     Order Specific Question:   May the Radiologist  modify the order per protocol to meet the clinical needs of the patient?     Answer:   Yes    Ambulatory Referral to Pulmonary Disease Management     Referral Priority:   Routine     Referral Type:   Consultation     Referral Reason:   Specialty Services Required     Requested Specialty:   Pulmonary Disease     Number of Visits Requested:   1    Home Sleep Studies     Standing Status:   Future     Standing Expiration Date:   11/11/2020     Scheduling Instructions:      TWO NIGHT     Patient is currently in stable compensated pulmonary status.  Current pulmonary interventions include p.r.n. short-acting beta agonists, continue nocturnal oxygen awaiting completed home sleep study, no recent respiratory infectious exacerbations since April 2019, spirometry have shown improvement in FEV1 and FVC.  No current pulmonary contraindications for transplant eligibility    Follow up in about 3 months (around 2/12/2020), or PDM, PRN MINNIE, Home sleep test, chest ct in 12 months.    This note was prepared using voice recognition system and is likely to have sound alike errors that may have been overlooked even after proof reading.  Please call me with any questions    Discussed diagnosis, its evaluation, treatment and usual course. All questions answered.    Thank you for the courtesy of participating in the care of this patient    Hima Urbina MD

## 2019-11-12 NOTE — PROGRESS NOTES
Patient's INR is slightly sub-therapeutic at 1.9.  Mr. White reports eating more vegetables.  Consistency discussed in great detail.  Patient agrees to eat iceberg lettuce, peeled cucumbers and cabbage once per week.   No abnormal pain, swelling or weakness noted. Will re-challenge current dose.   Instructions given for patient to resume current dose of warfarin 15mg on Thursdays and 7.5mg on all other days of the week.  Patient voiced understanding to all matters dicussed.  Follow-up in 2 weeks.

## 2019-11-12 NOTE — ASSESSMENT & PLAN NOTE
In-lab polysomnography was nondiagnostic due to insufficient total sleep time  Repeat home sleep study sequential night study

## 2019-11-12 NOTE — PATIENT INSTRUCTIONS
Pulmonary Nodule      A pulmonary nodule is a small, round spot in your lung. It is usually found when pictures of your lungs are taken for other reasons. A pulmonary nodule can be caused by an infection, injury, or some diseases. Tests will be done to see if the nodule is cancer ( malignant) or not cancer ( benign).  HOME CARE  Stop smoking if you smoke. Ask your doctor how to quit.   Treatment of the pulmonary nodule will depend on the test results.   Get regular pictures taken of your lungs to see if the pulmonary nodule changes. This is to see if the pulmonary nodule gets bigger.   Keep all your appointments with your doctor. If you cannot make an appointment, call your doctor and reschedule your appointment.   GET HELP RIGHT AWAY IF:  You cannot catch your breath or it is hard to breath.   You have a cough that does not go away.   You cough up blood.   You have sudden chest pain.   You have a fever.   MAKE SURE YOU:  Understand these instructions.   Will watch your condition.   Will get help right away if you are not doing well or get worse.       Please call office [phone] for any questions

## 2019-11-26 ENCOUNTER — ANTI-COAG VISIT (OUTPATIENT)
Dept: CARDIOLOGY | Facility: CLINIC | Age: 49
End: 2019-11-26
Payer: MEDICARE

## 2019-11-26 DIAGNOSIS — Z95.2 S/P AVR (AORTIC VALVE REPLACEMENT): ICD-10-CM

## 2019-11-26 DIAGNOSIS — Z79.01 LONG TERM (CURRENT) USE OF ANTICOAGULANTS: Primary | ICD-10-CM

## 2019-11-26 DIAGNOSIS — I48.91 ATRIAL FIBRILLATION, UNSPECIFIED TYPE: ICD-10-CM

## 2019-11-26 LAB — INR PPP: 1.3 (ref 2–3)

## 2019-11-26 PROCEDURE — 93793 ANTICOAG MGMT PT WARFARIN: CPT | Mod: ,,,

## 2019-11-26 PROCEDURE — 85610 PROTHROMBIN TIME: CPT | Mod: PBBFAC

## 2019-11-26 PROCEDURE — 93793 PR ANTICOAGULANT MGMT FOR PT TAKING WARFARIN: ICD-10-PCS | Mod: ,,,

## 2019-11-26 NOTE — PROGRESS NOTES
Patient's INR is sub-therapeutic at 1.3.  Mr. White reports missing a dose of warfarin as he has been running low on his medications.   Patient reminded to get refills filled early to avoid running out of medications.  No abnormal pain, swelling or weakness noted.  Instructions given for patient to take warfarin 15mg on today; then resume current dose of warfarin 15mg on Thursdays and 7.5mg on all other days of the week.  Patient voiced understanding.  Follow-up on 12/5/19 with labs.

## 2019-11-29 ENCOUNTER — TELEPHONE (OUTPATIENT)
Dept: TRANSPLANT | Facility: CLINIC | Age: 49
End: 2019-11-29

## 2019-11-29 NOTE — TELEPHONE ENCOUNTER
"I spoke with Isidro today about the fact he never went back to urology. He states he is no longer having that problem so he does not see why he has to be "poked and prodded." I advised I will present his case as is to see what the transplant committee says. He was happy with that.  "

## 2019-11-29 NOTE — TELEPHONE ENCOUNTER
----- Message from Fuad Gudino MD sent at 10/1/2019  5:30 PM CDT -----  Hi no problem,    3. ESRD  - cont HD  - stable CV status for transplant, recent stress/echo negative  - repeat stress echo in 6 months    I can write a formal preop eval as well if needed.  Overall low risk    Pre-OP CV evaluation prior to renal transplant  Lowperiop risk of CV events for moderate risk procedure.  Good functional and exercise capacity.  No chest pain, active arrhythmia and CHF symptoms.  Ok to proceed to the scheduled surgery without further cardiac study x 6 months.     - Dr. Gudino     ----- Message -----  From: Analilia Kurtz  Sent: 10/1/2019   5:09 PM CDT  To: Fuad Gudino MD    This gentleman recently saw you. Would you be able to give him clearance for kidney transplant? Risk Stratification?    Analilia   Clinical Transplant Coordinator

## 2019-12-05 ENCOUNTER — HOSPITAL ENCOUNTER (OUTPATIENT)
Dept: RADIOLOGY | Facility: HOSPITAL | Age: 49
Discharge: HOME | End: 2019-12-05
Attending: NURSE PRACTITIONER
Payer: MEDICARE

## 2019-12-05 ENCOUNTER — ANTI-COAG VISIT (OUTPATIENT)
Dept: CARDIOLOGY | Facility: CLINIC | Age: 49
End: 2019-12-05
Payer: MEDICARE

## 2019-12-05 DIAGNOSIS — Z95.2 S/P AVR (AORTIC VALVE REPLACEMENT): ICD-10-CM

## 2019-12-05 DIAGNOSIS — B18.1 CHRONIC VIRAL HEPATITIS B WITHOUT DELTA AGENT AND WITHOUT COMA: ICD-10-CM

## 2019-12-05 DIAGNOSIS — I48.91 ATRIAL FIBRILLATION, UNSPECIFIED TYPE: ICD-10-CM

## 2019-12-05 DIAGNOSIS — Z79.01 LONG TERM (CURRENT) USE OF ANTICOAGULANTS: ICD-10-CM

## 2019-12-05 PROCEDURE — 93793 PR ANTICOAGULANT MGMT FOR PT TAKING WARFARIN: ICD-10-PCS | Mod: ,,,

## 2019-12-05 PROCEDURE — 76700 US EXAM ABDOM COMPLETE: CPT | Mod: TC,TXP

## 2019-12-05 PROCEDURE — 93793 ANTICOAG MGMT PT WARFARIN: CPT | Mod: ,,,

## 2019-12-05 NOTE — PROGRESS NOTES
Patient's INR is therapeutic at 2.1.  Patient contacted.  No changes reported.  Instructions given for patient to continue current dose of warfarin 15mg on Thursdays and 7.5mg on all other days of the week.  Patient voiced understanding.   Recheck in 2 weeks.  Please call should you have any questions or concerns at [phone] or [phone].

## 2019-12-10 DIAGNOSIS — B18.1 CHRONIC VIRAL HEPATITIS B WITHOUT DELTA AGENT AND WITHOUT COMA: Primary | ICD-10-CM

## 2019-12-11 ENCOUNTER — TELEPHONE (OUTPATIENT)
Dept: HEPATOLOGY | Facility: CLINIC | Age: 49
End: 2019-12-11

## 2019-12-11 NOTE — TELEPHONE ENCOUNTER
----- Message from Lina Dominguez NP sent at 12/10/2019 12:36 PM CST -----  Please inform patient- Ultrasound is stable, no masses in the liver.   Next ultrasound for liver cancer screening will be due in 6 months (June 2020).    Also needs follow-up in the liver clinic.    Please schedule labs (CBC, CMP, INR, AFP, HBV DNA), ultrasound, and follow-up visit in 6 months.

## 2019-12-13 ENCOUNTER — TELEPHONE (OUTPATIENT)
Dept: PULMONOLOGY | Facility: CLINIC | Age: 49
End: 2019-12-13

## 2019-12-13 NOTE — TELEPHONE ENCOUNTER
Called patient to schedule initial pulmonary disease management appointment.   Patient declined PDM at this time. Stated that his breathing is fine and that he is currently attending too many other appointments. Stated understanding and advised patient to call PDM with any further needs that arise.

## 2019-12-18 NOTE — PROGRESS NOTES
Dialysis     2 hours of hemofiltration goal of 2 liter met blood returned catheter flushed and capped.  VSS    Psychotherapy Provided: Individual Psychotherapy 45 minutes     Length of time in session: 45 minutes, follow up in 2 week    Goals addressed in session: 2    Pain:      none    0    Current suicide risk : Low     D:  MSW met with Josee Lowe for session  He is no longer seeing the women he was seeing  That ended around 6 weeks ago  He has decided to go back to college and finish his bachelor's degree  He called [employer] where he did go to college and emailed the [employer]  The next step is to set up a meeting with the [employer] for the week of Jan 6th  When he quit he only needed one semester  He has a total of 137 credits and knows he does not need many classes to graduate  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

## 2019-12-19 ENCOUNTER — TELEPHONE (OUTPATIENT)
Dept: PULMONOLOGY | Facility: CLINIC | Age: 49
End: 2019-12-19

## 2019-12-19 RX ORDER — HYDRALAZINE HYDROCHLORIDE 100 MG/1
TABLET, FILM COATED ORAL
Qty: 90 TABLET | Refills: 5 | Status: SHIPPED | OUTPATIENT
Start: 2019-12-19 | End: 2020-09-08 | Stop reason: SDUPTHER

## 2019-12-19 NOTE — TELEPHONE ENCOUNTER
----- Message from Darling Bowers sent at 12/19/2019  3:07 PM CST -----  Contact: pt  Pt is having discrepancies with the transplant doctors. Carlitos is telling pt he is fine and transplant drs are telling him differently.  Please call pt at [phone].

## 2019-12-19 NOTE — TELEPHONE ENCOUNTER
Spoke to patient and informed him that he needs to follow-up CT scan to review nodule in his chest  Follow-up with transplant as advised

## 2019-12-19 NOTE — TELEPHONE ENCOUNTER
"----- Message from Ana Rabago MA sent at 12/19/2019  4:31 PM CST -----  Contact: pt   Spoke with pt. He stated that when he saw you last you told him everything was normal with his lungs, but the transplant people are saying different. He said they told him to ask you," if everything was good why did you want to still see him"? Pt wants you and him together to verify with the lady in transplant that everything  is good with him because now he is confused as of if he is ok and normal or not. Please advise.  ----- Message -----  From: Johnny Villalobos  Sent: 12/19/2019   4:20 PM CST  To: Carlitos Rabago Staff    .Type:  Patient Returning Call    Who Called: pt   Who Left Message for Patient: ana   Does the patient know what this is regarding?: yes   Would the patient rather a call back or a response via [facility]? Callback   Best Call Back Number .[phone]     Additional Information:       "

## 2019-12-20 ENCOUNTER — OFFICE VISIT (OUTPATIENT)
Dept: INTERNAL MEDICINE | Facility: CLINIC | Age: 49
End: 2019-12-20
Payer: MEDICARE

## 2019-12-20 ENCOUNTER — ANTI-COAG VISIT (OUTPATIENT)
Dept: CARDIOLOGY | Facility: CLINIC | Age: 49
End: 2019-12-20
Payer: MEDICARE

## 2019-12-20 VITALS
SYSTOLIC BLOOD PRESSURE: 130 MMHG | OXYGEN SATURATION: 96 % | HEART RATE: 76 BPM | HEIGHT: 73 IN | TEMPERATURE: 97 F | DIASTOLIC BLOOD PRESSURE: 82 MMHG | WEIGHT: 229.06 LBS | BODY MASS INDEX: 30.36 KG/M2

## 2019-12-20 DIAGNOSIS — I27.20 PULMONARY HTN: ICD-10-CM

## 2019-12-20 DIAGNOSIS — N18.6 ESRD (END STAGE RENAL DISEASE) ON DIALYSIS: ICD-10-CM

## 2019-12-20 DIAGNOSIS — I10 ESSENTIAL HYPERTENSION: ICD-10-CM

## 2019-12-20 DIAGNOSIS — I11.0 HYPERTENSIVE CARDIOMEGALY WITH HEART FAILURE: ICD-10-CM

## 2019-12-20 DIAGNOSIS — Z99.2 ESRD (END STAGE RENAL DISEASE) ON DIALYSIS: ICD-10-CM

## 2019-12-20 DIAGNOSIS — I27.9 CHRONIC PULMONARY HEART DISEASE: ICD-10-CM

## 2019-12-20 DIAGNOSIS — Z79.01 ANTICOAGULATED: ICD-10-CM

## 2019-12-20 DIAGNOSIS — B18.1 CHRONIC VIRAL HEPATITIS B WITHOUT DELTA AGENT AND WITHOUT COMA: ICD-10-CM

## 2019-12-20 DIAGNOSIS — I48.91 ATRIAL FIBRILLATION, UNSPECIFIED TYPE: ICD-10-CM

## 2019-12-20 DIAGNOSIS — D63.8 ANEMIA OF CHRONIC DISEASE: ICD-10-CM

## 2019-12-20 DIAGNOSIS — Z95.2 S/P AVR (AORTIC VALVE REPLACEMENT): ICD-10-CM

## 2019-12-20 DIAGNOSIS — Z79.01 LONG TERM (CURRENT) USE OF ANTICOAGULANTS: Primary | ICD-10-CM

## 2019-12-20 DIAGNOSIS — N52.1 ERECTILE DYSFUNCTION DUE TO DISEASES CLASSIFIED ELSEWHERE: Primary | ICD-10-CM

## 2019-12-20 DIAGNOSIS — I50.32 CHRONIC DIASTOLIC HEART FAILURE: ICD-10-CM

## 2019-12-20 LAB — INR PPP: 1.8 (ref 2–3)

## 2019-12-20 PROCEDURE — 93793 PR ANTICOAGULANT MGMT FOR PT TAKING WARFARIN: ICD-10-PCS | Mod: ,,,

## 2019-12-20 PROCEDURE — 99213 OFFICE O/P EST LOW 20 MIN: CPT | Mod: PBBFAC | Performed by: FAMILY MEDICINE

## 2019-12-20 PROCEDURE — 99214 OFFICE O/P EST MOD 30 MIN: CPT | Mod: S$PBB,,, | Performed by: FAMILY MEDICINE

## 2019-12-20 PROCEDURE — 99999 PR PBB SHADOW E&M-EST. PATIENT-LVL III: ICD-10-PCS | Mod: PBBFAC,,, | Performed by: FAMILY MEDICINE

## 2019-12-20 PROCEDURE — 93793 ANTICOAG MGMT PT WARFARIN: CPT | Mod: ,,,

## 2019-12-20 PROCEDURE — 99214 PR OFFICE/OUTPT VISIT, EST, LEVL IV, 30-39 MIN: ICD-10-PCS | Mod: S$PBB,,, | Performed by: FAMILY MEDICINE

## 2019-12-20 PROCEDURE — 99999 PR PBB SHADOW E&M-EST. PATIENT-LVL III: CPT | Mod: PBBFAC,,, | Performed by: FAMILY MEDICINE

## 2019-12-20 PROCEDURE — 85610 PROTHROMBIN TIME: CPT | Mod: PBBFAC

## 2019-12-20 NOTE — PROGRESS NOTES
Patient's INR is sub-therapeutic at 1.8.  Mr. White has bleeding at his access port following dialysis, therefore he held a dose of warfarin this week.   No abnormal numbness or weakness noted.  Instructions given for patient to take warfarin 11.25mg on today; then resume current dose of warfarin 15mg on Thursdays and 7.5mg on all other days of the week.  Patient voiced understanding.  Follow-up on 1/2/20.

## 2019-12-20 NOTE — PROGRESS NOTES
Isidro White Jr.  12/20/2019  [id number]    Miguel Campo MD  Patient Care Team:  Miguel Campo MD as PCP - General (Family Medicine)  Has the patient seen any provider outside of the [facility] network since the last visit? (no). If yes, HIPPA forms completed and records requested.      Chief Complaint:  Chief Complaint   Patient presents with    Follow-up       History of Present Illness:  Patient is a 49-year-old male with chronic hepatitis B, end-stage renal disease on hemodialysis, CHF, AFib, hypertensive cardiomyopathy, and anemia chronic disease who presents to clinic today for follow up.  Patient states that he was never contacted for the urology appointment.  Patient states that he is still having significant erectile dysfunction.  Patient states that he would like a consultation to see if there is anything that he can do to help with his erectile dysfunction.  Patient states that overall he is doing very well.  States he is going to his dialysis as instructed.  States he has followed up with all his appointments.  States that his last appointment with Cardiology went well.  Denies any acute complaints or concerns today.  Does not need any refills on his medications.    Follow-up   Pertinent negatives include no chest pain, chills, coughing, fever, headaches, myalgias, nausea, rash, vomiting or weakness.           History:  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     HD M, W, F    ESRD on dialysis     Hepatitis B     Hyperlipidemia     Hypertension     DANIEL (obstructive sleep apnea) 11/12/2019    Secondary hyperparathyroidism of renal origin     Supraventricular tachycardia     atrial tachycardia    Tachycardia     Valvular regurgitation     AI, TR     Past Surgical  History:   Procedure Laterality Date    ASD REPAIR      age 8 [facility]    AV Graft Creation Left 03/2017    CARD[facility]AC [facility]ATION      [facility]     CARDIAC VALVE SURGERY  02/08/2017    22 mm Medtronic AV, 28 mm TV Medtronic annuloplaty ring    COLONOSCOPY N/A 8/27/2019    Procedure: COLONOSCOPY;  Surgeon: Addie John MD;  Location: [facility];  Service: Endoscopy;  Laterality: N/A;  dialysis pt *needs K*    ESOPHAGOGASTRODUODENOSCOPY N/A 8/27/2019    Procedure: EGD (ESOPHAGOGASTRODUODENOSCOPY);  Surgeon: Addie John MD;  Location: [facility];  Service: Endoscopy;  Laterality: N/A;    RADIOFREQUENCY ABLATION  03/13/2017    Atrial tachycardia     Family History   Problem Relation Age of Onset    Leukemia Mother     Heart attack Father         massive MI at age 67    No Known Problems Sister     No Known Problems Brother     No Known Problems Sister     No Known Problems Sister     Heart failure Paternal Grandmother     Diabetes Paternal Aunt     Hypertension Paternal Aunt     Leukemia Paternal Aunt         CLL    Suicide Paternal Uncle     Anesthesia problems Paternal Uncle     Diabetes Paternal Aunt     Stroke Paternal Aunt     Valvular heart disease Maternal Aunt     Kidney disease Neg Hx     Colon cancer Neg Hx      Social History     Socioeconomic History    Marital status: Single     Spouse name: Not on file    Number of children: Not on file    Years of education: Not on file    Highest education level: Not on file   Occupational History    Not on file   Social Needs    Financial resource strain: Not on file    Food insecurity:     Worry: Not on file     Inability: Not on file    Transportation needs:     Medical: Not on file     Non-medical: Not on file   Tobacco Use    Smoking status: Never Smoker    Smokeless tobacco: Never Used   Substance and Sexual Activity    Alcohol use: No     Frequency: Never     Comment: quit in 2016; does have heavy drinking  history; started drinking at age 13; was drinking a 12 pack over the weekend and two 24 ounces of beer a day during the week along with a pint of hard alcohol    Drug use: No    Sexual activity: Not on file   Lifestyle    Physical activity:     Days per week: Not on file     Minutes per session: Not on file    Stress: Not on file   Relationships    Social connections:     Talks on phone: Not on file     Gets together: Not on file     Attends Christianity service: Not on file     Active member of club or organization: Not on file     Attends meetings of clubs or organizations: Not on file     Relationship status: Not on file   Other Topics Concern    Not on file   Social History Narrative    Not on file     Patient Active Problem List   Diagnosis    Essential hypertension    Hypertensive cardiomegaly with heart failure    Pulmonary HTN    Nonrheumatic aortic valve stenosis    Adult congenital heart disease    Hyperglycemia    S/P AVR (aortic valve replacement)    Postprocedural hypotension    ESRD (end stage renal disease) on dialysis    Severe protein-calorie malnutrition    Anemia of chronic disease    Chronic diastolic heart failure    Atrial tachycardia    History of radiofrequency ablation for complex right atrial arrhythmia    Drug-induced erectile dysfunction    Chronic pulmonary heart disease    Secondary hyperparathyroidism of renal origin    Chronic viral hepatitis B without delta agent and without coma    Atrial fibrillation    Anticoagulated    Encounter for colorectal cancer screening    Colon cancer screening    Special screening for malignant neoplasms, colon    Abnormal diffusion capacity determined by pulmonary function test    Mixed restrictive and obstructive lung disease    DANIEL (obstructive sleep apnea)    Nocturnal hypoxemia due to obstructive chronic bronchitis    Pulmonary nodule, left     Review of patient's allergies indicates:  No Known Allergies    The  following were reviewed at this visit: active problem list, medication list, allergies, family history, social history, and health maintenance.    Medications:  Current Outpatient Medications on File Prior to Visit   Medication Sig Dispense Refill    albuterol (VENTOLIN HFA) 90 mcg/actuation inhaler Inhale 2 puffs into the lungs every 6 (six) hours as needed for Wheezing or Shortness of Breath. Rescue 18 g 3    amLODIPine (NORVASC) 10 MG tablet TAKE ONE TABLET BY MOUTH EVERY DAY 30 tablet 11    ascorbic acid, vitamin C, (VITAMIN C) 500 MG tablet Take 1 tablet (500 mg total) by mouth 2 (two) times daily.      b complex vitamins tablet Take 1 tablet by mouth once daily.      calcium acetate (PHOSLO) 667 mg capsule Take 1 capsule (667 mg total) by mouth 3 (three) times daily. 90 capsule 11    diltiaZEM (CARDIZEM CD) 360 MG 24 hr capsule Take 1 capsule (360 mg total) by mouth once daily. 90 capsule 1    enoxaparin (LOVENOX) 100 mg/mL Syrg Inject 0.9 mLs (90 mg total) into the skin once daily. 5 mL 0    epoetin vikki (PROCRIT) 10,000 unit/mL injection Inject 1 mL (10,000 Units total) into the skin every Mon, Wed, Fri. To be given with HD      fish oil-omega-3 fatty acids 300-1,000 mg capsule Take 2 g by mouth once daily.      furosemide (LASIX) 80 MG tablet TAKE ONE TABLET BY MOUTH EVERY DAY (Patient taking differently: TAKE ONE TABLET BY MOUTH EVERY DAY per pt BID 6/18/19) 30 tablet 9    hydrALAZINE (APRESOLINE) 100 MG tablet TAKE ONE TABLET BY MOUTH THREE TIMES DAILY INCLUDING DIALYSIS DAYS 90 tablet 5    hydrOXYzine pamoate (VISTARIL) 25 MG Cap TAKE ONE CAPSULE BY MOUTH EVERY DAY AS NEEDED FOR ITCHING 30 capsule 2    labetalol (NORMODYNE) 100 MG tablet TAKE ONE TABLET BY MOUTH THREE TIMES DAILY AS NEEDED FOR HEART RATE > 120 90 tablet 3    lisinopril (PRINIVIL,ZESTRIL) 40 MG tablet TAKE ONE TABLET BY MOUTH EVERY DAY 30 tablet 9    multivitamin with minerals tablet Take 1 tablet by mouth once daily.       pantoprazole (PROTONIX) 40 MG tablet TAKE ONE TABLET BY MOUTH EVERY DAY 30 tablet 9    ETHAN-RACQUEL RX 1- mg-mg-mcg Tab TAKE ONE TABLET BY MOUTH EVERY DAY 90 tablet 2    tenofovir (VIREAD) 300 mg Tab Take 1 tablet (300 mg total) by mouth once a week. AFTER DIALYSIS 12 tablet 3    vitamin D (VITAMIN D3) 1000 units Tab Take 1,000 Units by mouth 3 (three) times a week.      warfarin (COUMADIN) 7.5 MG tablet Take 2 tablets by mouth on Thursdays and 1 tablet on all other days of the week. 35 tablet 2    omega-3 fatty acids-vitamin E (FISH OIL) 1,000 mg Cap Take 1 capsule by mouth once daily.  0    zolpidem (AMBIEN) 5 MG Tab TAKE ONE TABLET BY MOUTH EACH EVENING AT BEDTIME AS NEEDED FOR INSOMNIA (Patient not taking: Reported on 12/20/2019) 30 tablet 4     Current Facility-Administered Medications on File Prior to Visit   Medication Dose Route Frequency Provider Last Rate Last Dose    0.9%  NaCl infusion   Intravenous Continuous Christopher Jane MD        sodium chloride 0.9% flush 10 mL  10 mL Intravenous PRN Christopher Jane MD           Medications have been reviewed and reconciled with patient at this visit.  Barriers to medications present (no)    Adverse reactions to current medications (no)    Over the counter medications reviewed (Yes ), and if needed added to active Medication list at this visit.     Exam:  Wt Readings from Last 3 Encounters:   12/20/19 103.9 kg (229 lb 0.9 oz)   11/12/19 102 kg (224 lb 13.9 oz)   10/15/19 99.9 kg (220 lb 3.8 oz)     Temp Readings from Last 3 Encounters:   12/20/19 96.8 °F (36 °C) (Tympanic)   10/05/19 98 °F (36.7 °C) (Oral)   10/02/19 96.3 °F (35.7 °C) (Tympanic)     BP Readings from Last 3 Encounters:   12/20/19 130/82   11/12/19 132/82   10/05/19 133/72     Pulse Readings from Last 3 Encounters:   12/20/19 76   11/12/19 87   10/05/19 78     Body mass index is 30.22 kg/m².      Review of Systems   Constitutional: Negative for chills, fever and malaise/fatigue.   HENT:  Negative for hearing loss.    Eyes: Negative for redness.   Respiratory: Negative for cough and shortness of breath.    Cardiovascular: Negative for chest pain and leg swelling.   Gastrointestinal: Negative for nausea and vomiting.   Musculoskeletal: Negative for back pain, falls, joint pain and myalgias.   Skin: Negative for rash.   Neurological: Negative for tingling, sensory change, focal weakness, weakness and headaches.     Physical Exam   Constitutional: He is oriented to person, place, and time. He appears well-developed and well-nourished. No distress.   HENT:   Head: Normocephalic and atraumatic.   Eyes: Conjunctivae are normal. No scleral icterus.   Cardiovascular: Normal rate, regular rhythm and normal heart sounds. Exam reveals no gallop and no friction rub.   No murmur heard.  Pulmonary/Chest: Effort normal and breath sounds normal. No respiratory distress. He has no wheezes. He has no rales.   Neurological: He is alert and oriented to person, place, and time.   Skin: Skin is warm and dry. He is not diaphoretic.   Psychiatric: He has a normal mood and affect.   Nursing note and vitals reviewed.      Laboratory Reviewed ({Yes)  Lab Results   Component Value Date    WBC 4.93 12/05/2019    HGB 9.4 (L) 12/05/2019    HCT 29.0 (L) 12/05/2019     (L) 12/05/2019    CHOL 213 (H) 01/08/2019    TRIG 103 01/08/2019    HDL 50 01/08/2019    ALT 20 12/05/2019    AST 14 12/05/2019     12/05/2019    K 4.2 12/05/2019    CL 99 12/05/2019    CREATININE 12.2 (H) 12/05/2019    BUN 40 (H) 12/05/2019    CO2 30 (H) 12/05/2019    PSA 0.62 01/08/2019    INR 2.1 (H) 12/05/2019    HGBA1C 4.8 09/20/2019       Isidro was seen today for follow-up.    Diagnoses and all orders for this visit:    Erectile dysfunction due to diseases classified elsewhere  -     Ambulatory Referral to Urology    ESRD (end stage renal disease) on dialysis    Chronic diastolic heart failure    Hypertensive cardiomegaly with heart  failure    Chronic pulmonary heart disease    Chronic viral hepatitis B without delta agent and without coma    Pulmonary HTN    S/P AVR (aortic valve replacement)    Essential hypertension    Anticoagulated    Anemia of chronic disease     Erectile dysfunction:  -will refer to Urology for further evaluation    ESRD on dialysis:  -follow up with dialysis and Nephrology as instructed    CHF:  -no evidence of volume overload today  -follow up with Cardiology as instructed    -Patient's lab results were reviewed and discussed with patient   -Treatment options and alternatives were discussed with the patient. Patient expressed understanding. Patient was given the opportunity to ask questions and be an active participant in their medical care. Patient had no further questions or concerns at this time.   -Patient is an overall high risk for health complications from their medical conditions.     Follow up: Follow up if symptoms worsen or fail to improve.    After visit summary was printed and given to patient upon discharge today.  Patient goals and care plan are included in After Visit Summary.

## 2020-01-03 ENCOUNTER — COMMITTEE REVIEW (OUTPATIENT)
Dept: TRANSPLANT | Facility: CLINIC | Age: 50
End: 2020-01-03

## 2020-01-03 NOTE — LETTER
January 3, 2020    Isidro White  [address]   [address]  [place] [address]    Dear Isidro White:  MRN: [id number]    It is the duty of the [facility] Selection [facility] to determine which patients are candidates for a transplant. For this reason, our committee has the difficult task of evaluating patients to determine which ones have the greatest chance of having a successful transplant. We are aware of the magnitude of this responsibility, and we approach it with reverence and humility.    It is with regret I inform you that you are not approved as a transplant candidate due to failure to follow through with recommendation for cystoscopy per urology and no clearance for hematuria, need for updated heart testing with review and updated clearance from cardiology, repeat colonoscopy due to poor prep on prior exam, and follow up with pulmonary.  Based on this review, we have determined that at this time, you are not a candidate for a transplant at [facility].  Once all this testing, procedures and updated clearances are obtained you can be re referred.    The selection committee carefully considers each patient's transplant candidacy and determines whether it is safe to proceed with transplantation on a case-by-case basis using established selection criteria.  At present, the risk of proceeding with an elective transplant surgery has become too high.                                                                               Although the selection committee believes you are not a suitable transplant candidate, you have the option to be evaluated at other transplant centers who may have different selection criteria.  You may request your [facility] records be sent to any center of your choice by contacting our Medical Records Department at [phone].                                                                               Attached is a letter from the [employer] Organ Sharing  (UNOS).  It describes the services and information offered to patients by UNOS and the Organ Procurement and Transplant Network.    The [facility] sincerely wishes you the best and remains available to answer any questions.  Please do not hesitate to contact our pre-transplant office if we can assist you in any other way.                                                                               Sincerely,    Coco Ibrahim MD  Medical Director, Kidney & Kidney/Pancreas Transplantation  [facility]  Encl: UNOS Letter  CC:  Dr. Gus Hurst          [facility]              OPTN/UNOS: Your Resource for Organ Transplant Information        If you have a question regarding your own medical care, you always should call your transplant center first. However, for general organ transplant-related information, you can call the [employer] Organ [employer]aring (UNOS) toll-free patient services line at 1-324.622.8870.    Anyone, including potential transplant candidates, recipients, family members/friends, living donors, and/or donor family members can call this number to:    · talk about organ donation, living donation, how transplant and donation work, the donation process, transplant policies, and transplant/donor information;  · get a free patient information kit with helpful booklets, waiting list and transplant information, and a list of all transplant centers;  · ask questions about the Organ Procurement and Transplantation Network (OPTN) web site (www.optn.transplant.hrsa.gov); the UNOS Web site (www.unos.org); or the UNOS web site for living donors and transplant recipients (www.transplantliving.org);  · learn how UNOS and the OPTN can help you;  · talk about any concerns that you may have with a transplant center and how they perform    UNOS is a not-for-profit organization that provides all of the administrative services for the national OPTN under federal  contract to the Health Resources and Services Administration (HRSA), an agency under the U.S. Department of Health and Human Services (HHS).     UNOS and OPTN responsibilities include:    · writing educational material for patients, the public and professionals;  · helping to make people aware of the need for donated organs and tissue;  · writing organ transplant policy with help from doctors, nurses, transplant patients/candidates, donor families, living donors, and the public;  · coordinating the organ matching and placement process;  · collecting information about every organ transplant and donation that occurs in the [place].    Remember, you should contact your transplant center directly if you have questions or concerns about your own medical care including medical records, work-up progress and test reports. [facility] is not your transplant center, and staff at [facility] will not be able to transfer you to your transplant center, so keep your transplant centers phone number handy. But, while you research your transplant needs and learn as much as you can about transplantation and donation, we welcome your call to our toll-free patient services line at [phone].

## 2020-01-03 NOTE — COMMITTEE REVIEW
"Native Organ Dx: Hypertensive Nephrosclerosis      Not approved for LRD/CAD transplant due to failure to comply recommendations regarding cystoscopy.  May be re-referred after completion of cystoscopy with Urology clearance, an updated colonoscopy, updated heart testing and cardiology clearance, and follow up with pulmonary as previously recommended in 3 months.    I spoke with Isidro about committee decision and he was upset at being denied just because "I didn't let him stick that eligio up my penis." I advised that as we had spoken about before that the testing was necessary for blood in his urine and that although he was not seeing blood it did not mean he did not have a problem in his bladder. As we had before, we spoke of the importance of following through with medical recommendations. He also went on to state that he knows of people who have been transplanted in worse shape than him and he advised that we look at each candidate and their health/workup to determine candidacy and he might not always know all the facts of someone else's case. He stated to send him the letter and he will "get that eligio stuck up my penis" and all the other follow up and he will come back. I reviewed that he will need re referral once these things are done and he verbalized understanding.    Note written by Analilia Kurtz RN    ===============================================    I was present at the meeting and attest to the decision of the committee.  "

## 2020-01-07 ENCOUNTER — ANTI-COAG VISIT (OUTPATIENT)
Dept: CARDIOLOGY | Facility: CLINIC | Age: 50
End: 2020-01-07
Payer: MEDICARE

## 2020-01-07 ENCOUNTER — TELEPHONE (OUTPATIENT)
Dept: PULMONOLOGY | Facility: CLINIC | Age: 50
End: 2020-01-07

## 2020-01-07 DIAGNOSIS — Z95.2 S/P AVR (AORTIC VALVE REPLACEMENT): ICD-10-CM

## 2020-01-07 DIAGNOSIS — Z79.01 LONG TERM (CURRENT) USE OF ANTICOAGULANTS: Primary | ICD-10-CM

## 2020-01-07 LAB — INR PPP: 2.8 (ref 2–3)

## 2020-01-07 PROCEDURE — 93793 ANTICOAG MGMT PT WARFARIN: CPT | Mod: ,,,

## 2020-01-07 PROCEDURE — 85610 PROTHROMBIN TIME: CPT | Mod: PBBFAC

## 2020-01-07 PROCEDURE — 93793 PR ANTICOAGULANT MGMT FOR PT TAKING WARFARIN: ICD-10-PCS | Mod: ,,,

## 2020-01-07 NOTE — ASSESSMENT & PLAN NOTE
-pulmonology performed thoracentesis (3/10) with 1500ml serosang/ sang tap, specimen not received in lab post tap  -CXR today with small amount of pleural effusion noted on right, left lung clear  - IS/flutter, cough deep breath/ heart pillow given, encouraged oob   Unknown

## 2020-01-07 NOTE — PROGRESS NOTES
Patient's INR is therapeutic at 2.8.  Previous instructions were followed.  No other changes noted.  Instructions were given to resume current dose of Warfarin 15 mg every Thursday and 7.5 mg on all other days per week.  Dose calendar given and reviewed with patient.  Recheck in 2 weeks.  Patient repeated back instructions and voiced understanding.

## 2020-01-21 ENCOUNTER — ANTI-COAG VISIT (OUTPATIENT)
Dept: CARDIOLOGY | Facility: CLINIC | Age: 50
End: 2020-01-21
Payer: MEDICARE

## 2020-01-21 DIAGNOSIS — I48.91 ATRIAL FIBRILLATION, UNSPECIFIED TYPE: ICD-10-CM

## 2020-01-21 DIAGNOSIS — Z79.01 LONG TERM (CURRENT) USE OF ANTICOAGULANTS: Primary | ICD-10-CM

## 2020-01-21 DIAGNOSIS — Z95.2 S/P AVR (AORTIC VALVE REPLACEMENT): ICD-10-CM

## 2020-01-21 LAB — INR PPP: 3.1 (ref 2–3)

## 2020-01-21 PROCEDURE — 93793 ANTICOAG MGMT PT WARFARIN: CPT | Mod: ,,,

## 2020-01-21 PROCEDURE — 93793 PR ANTICOAGULANT MGMT FOR PT TAKING WARFARIN: ICD-10-PCS | Mod: ,,,

## 2020-01-21 PROCEDURE — 85610 PROTHROMBIN TIME: CPT | Mod: PBBFAC

## 2020-01-21 NOTE — PROGRESS NOTES
Patient's INR is supra-therapeutic at 3.1.  Mr. White states he has not had his serving of green vegetables this past week - consistency discussed.  Patient will resume diet of 1 serving of greens per week.   No signs of bleeding noted; advised patient to seek immediate medical attention if he notices any abnormal bleeding.  Instructions given for patient to hold dose of warfarin on today; then resume current dose of warfarin 15mg on Thursdays and 7.5mg on all other days of the week.  Patient voiced understanding.  Recheck in 2 weeks.

## 2020-01-30 ENCOUNTER — TELEPHONE (OUTPATIENT)
Dept: PULMONOLOGY | Facility: CLINIC | Age: 50
End: 2020-01-30

## 2020-01-30 NOTE — TELEPHONE ENCOUNTER
----- Message from Sunita Love sent at 1/30/2020  1:06 PM CST -----  Patient no showed to  watermark device 4 times.  Novasom was offered and ordered for the patient.  The device was delivered and then sent back to [facility].  The reason in novasom shows pt refusal.  The patient states that he never received the device.  I asked him if he would like me to reorder the novasom and he declined.  He stated that he would call back later.

## 2020-02-04 ENCOUNTER — ANTI-COAG VISIT (OUTPATIENT)
Dept: CARDIOLOGY | Facility: CLINIC | Age: 50
End: 2020-02-04
Payer: MEDICARE

## 2020-02-04 DIAGNOSIS — I48.91 ATRIAL FIBRILLATION, UNSPECIFIED TYPE: ICD-10-CM

## 2020-02-04 DIAGNOSIS — Z79.01 LONG TERM (CURRENT) USE OF ANTICOAGULANTS: Primary | ICD-10-CM

## 2020-02-04 DIAGNOSIS — Z79.01 LONG TERM (CURRENT) USE OF ANTICOAGULANTS: ICD-10-CM

## 2020-02-04 DIAGNOSIS — Z95.2 S/P AVR (AORTIC VALVE REPLACEMENT): ICD-10-CM

## 2020-02-04 LAB — INR PPP: 2.4 (ref 2–3)

## 2020-02-04 PROCEDURE — 85610 PROTHROMBIN TIME: CPT | Mod: PBBFAC

## 2020-02-04 PROCEDURE — 93793 PR ANTICOAGULANT MGMT FOR PT TAKING WARFARIN: ICD-10-PCS | Mod: ,,,

## 2020-02-04 PROCEDURE — 93793 ANTICOAG MGMT PT WARFARIN: CPT | Mod: ,,,

## 2020-02-04 RX ORDER — WARFARIN 7.5 MG/1
TABLET ORAL
Qty: 35 TABLET | Refills: 2 | Status: SHIPPED | OUTPATIENT
Start: 2020-02-04 | End: 2020-06-02 | Stop reason: SDUPTHER

## 2020-02-04 NOTE — PROGRESS NOTES
Patient's INR is therapeutic at 2.4.  Previous dose instructions were followed.  No other changes reported.  Instructions were given to maintain current dose of Warfarin 15 mg every Thursday and 7.5 mg on all other days per week.  Recheck in 3 weeks.  Dose calendar given and reviewed with patient.  Patient voiced understanding.

## 2020-02-12 DIAGNOSIS — R07.89 OTHER CHEST PAIN: Primary | ICD-10-CM

## 2020-02-18 ENCOUNTER — OFFICE VISIT (OUTPATIENT)
Dept: PULMONOLOGY | Facility: CLINIC | Age: 50
End: 2020-02-18
Payer: MEDICARE

## 2020-02-18 VITALS
HEART RATE: 77 BPM | OXYGEN SATURATION: 98 % | BODY MASS INDEX: 30.85 KG/M2 | RESPIRATION RATE: 20 BRPM | SYSTOLIC BLOOD PRESSURE: 146 MMHG | DIASTOLIC BLOOD PRESSURE: 82 MMHG | WEIGHT: 232.81 LBS | HEIGHT: 73 IN

## 2020-02-18 DIAGNOSIS — Z99.2 ESRD (END STAGE RENAL DISEASE) ON DIALYSIS: Chronic | ICD-10-CM

## 2020-02-18 DIAGNOSIS — J98.4 MIXED RESTRICTIVE AND OBSTRUCTIVE LUNG DISEASE: ICD-10-CM

## 2020-02-18 DIAGNOSIS — I27.20 PULMONARY HTN: ICD-10-CM

## 2020-02-18 DIAGNOSIS — N18.6 ESRD (END STAGE RENAL DISEASE) ON DIALYSIS: Chronic | ICD-10-CM

## 2020-02-18 DIAGNOSIS — J43.9 MIXED RESTRICTIVE AND OBSTRUCTIVE LUNG DISEASE: ICD-10-CM

## 2020-02-18 DIAGNOSIS — G47.36 NOCTURNAL HYPOXEMIA DUE TO OBSTRUCTIVE CHRONIC BRONCHITIS: ICD-10-CM

## 2020-02-18 DIAGNOSIS — R91.1 PULMONARY NODULE, LEFT: ICD-10-CM

## 2020-02-18 DIAGNOSIS — G47.33 OSA (OBSTRUCTIVE SLEEP APNEA): Primary | ICD-10-CM

## 2020-02-18 DIAGNOSIS — I10 ESSENTIAL HYPERTENSION: ICD-10-CM

## 2020-02-18 DIAGNOSIS — I27.9 CHRONIC PULMONARY HEART DISEASE: ICD-10-CM

## 2020-02-18 DIAGNOSIS — J44.89 NOCTURNAL HYPOXEMIA DUE TO OBSTRUCTIVE CHRONIC BRONCHITIS: ICD-10-CM

## 2020-02-18 DIAGNOSIS — I11.0 HYPERTENSIVE CARDIOMEGALY WITH HEART FAILURE: ICD-10-CM

## 2020-02-18 DIAGNOSIS — R94.2 ABNORMAL DIFFUSION CAPACITY DETERMINED BY PULMONARY FUNCTION TEST: ICD-10-CM

## 2020-02-18 PROCEDURE — 99213 OFFICE O/P EST LOW 20 MIN: CPT | Mod: S$PBB,,, | Performed by: INTERNAL MEDICINE

## 2020-02-18 PROCEDURE — 99213 PR OFFICE/OUTPT VISIT, EST, LEVL III, 20-29 MIN: ICD-10-PCS | Mod: S$PBB,,, | Performed by: INTERNAL MEDICINE

## 2020-02-18 PROCEDURE — 99214 OFFICE O/P EST MOD 30 MIN: CPT | Mod: PBBFAC | Performed by: INTERNAL MEDICINE

## 2020-02-18 PROCEDURE — 99999 PR PBB SHADOW E&M-EST. PATIENT-LVL IV: CPT | Mod: PBBFAC,,, | Performed by: INTERNAL MEDICINE

## 2020-02-18 PROCEDURE — 99999 PR PBB SHADOW E&M-EST. PATIENT-LVL IV: ICD-10-PCS | Mod: PBBFAC,,, | Performed by: INTERNAL MEDICINE

## 2020-02-18 NOTE — PATIENT INSTRUCTIONS
Your provider has scheduled you for a sleep study.   You should be receiving a phone call from the sleep lab shortly after your study has been approved by your insurance. Please make sure you have your current [facility] numbers [facility] the [facility]. If you do not hear from anyone in the next 10 business days, please call the sleep lab at [phone] to schedule your sleep study. The sleep studies are performed at [facility] seven nights a week.  When you are scheduling your sleep study, they will also make you a follow up appointment with your provider. This follow up appointment will be 10-14 days after your sleep study to review the results. If it is noted that you do have sleep apnea on your initial sleep study, you may receive a call back for a second night study with the CPAP before you come back to the office.

## 2020-02-18 NOTE — PROGRESS NOTES
Subjective:       Patient ID: Isidro White Jr. is a 50 y.o. male.  Patient Active Problem List   Diagnosis    Essential hypertension    Hypertensive cardiomegaly with heart failure    Pulmonary HTN    Nonrheumatic aortic valve stenosis    Adult congenital heart disease    Hyperglycemia    S/P AVR (aortic valve replacement)    Postprocedural hypotension    ESRD (end stage renal disease) on dialysis    Severe protein-calorie malnutrition    Anemia of chronic disease    Chronic diastolic heart failure    Atrial tachycardia    History of radiofrequency ablation for complex right atrial arrhythmia    Drug-induced erectile dysfunction    Chronic pulmonary heart disease    Secondary hyperparathyroidism of renal origin    Chronic viral hepatitis B without delta agent and without coma    Atrial fibrillation    Anticoagulated    Encounter for colorectal cancer screening    Colon cancer screening    Special screening for malignant neoplasms, colon    Abnormal diffusion capacity determined by pulmonary function test    Mixed restrictive and obstructive lung disease    DANIEL (obstructive sleep apnea)    Nocturnal hypoxemia due to obstructive chronic bronchitis    Pulmonary nodule, left     Immunization History   Administered Date(s) Administered    Influenza - Quadrivalent - PF (6 months and older) 03/28/2019    PPD Test 02/13/2017    Pneumococcal Conjugate - 13 Valent 03/28/2019    Td (ADULT) 09/27/2005    Tdap 01/09/2019, 01/24/2019      Social History     Tobacco Use   Smoking Status Never Smoker   Smokeless Tobacco Never Used      EPWORTH SLEEPINESS SCALE 11/12/2019   Sitting and reading 2   Watching TV 3   Sitting, inactive in a public place (e.g. a theatre or a meeting) 1   As a passenger in a car for an hour without a break 2   Lying down to rest in the afternoon when circumstances permit 1   Sitting and talking to someone 0   Sitting quietly after a lunch without alcohol 0   In a car,  while stopped for a few minutes in traffic 0   Total score 9        Chief Complaint:  Isidro White Jr. is 50 y.o.   This a follow-up appointment to review out standing testing.  Was supposed to repeat sleep study to date not done.  Reviewed transplant committee recommendation  Has pulmonary opacity: need follow up 11/2020  Still did not complete Sleep testing:   After much discussion  Agrees to inlab PSG  Sleep study was nondiagnostic due to insufficient total sleep time in past  Home sleep study was attempted  V/Q scan was low probability for venous thromboembolism.  His pulmonary hypertension PA pressure estimated 57 most likely related to use dialysis shunt/diastolic dysfunction  Patient is currently not smoking.  ABGs at rest room air PaO2 was 88 mm Hg  Exercise desaturation cindy was 91%    The following portions of the patient's history were reviewed and updated as appropriate:   He  has a past medical history of Aortic valve stenosis, Atrial fibrillation, Atrial flutter, Cardiomyopathy, CHF (congestive heart failure), Drug-induced erectile dysfunction, ESRD due to hypertension, ESRD on dialysis, Hepatitis B, Hyperlipidemia, Hypertension, DANIEL (obstructive sleep apnea) (11/12/2019), Secondary hyperparathyroidism of renal origin, Supraventricular tachycardia, Tachycardia, and Valvular regurgitation.  He does not have any pertinent problems on file.  He  has a past surgical history that includes ASD repair; Cardiac valuve replacement (02/08/2017); Radiofrequency ablation (03/13/2017); Cardiac catheterization; AV Graft Creation (Left, 03/2017); Colonoscopy (N/A, 8/27/2019); and Esophagogastroduodenoscopy (N/A, 8/27/2019).  His family history includes Anesthesia problems in his paternal uncle; Diabetes in his paternal aunt and paternal aunt; Heart attack in his father; Heart failure in his paternal grandmother; Hypertension in his paternal aunt; Leukemia in his mother and paternal aunt; No Known Problems in  his brother, sister, sister, and sister; Stroke in his paternal aunt; Suicide in his paternal uncle; Valvular heart disease in his maternal aunt.  He  reports that he has never smoked. He has never used smokeless tobacco. He reports that he does not drink alcohol or use drugs.  He has a current medication list which includes the following prescription(s): albuterol, amlodipine, ascorbic acid (vitamin c), b complex vitamins, calcium acetate, diltiazem, epoetin vikki, fish oil-omega-3 fatty acids, hydralazine, hydroxyzine pamoate, labetalol, lisinopril, multivitamin with minerals, omega-3 fatty acids-vitamin e, pantoprazole, shawna-nataly rx, tenofovir, vitamin d, warfarin, and enoxaparin, and the following Facility-Administered Medications: sodium chloride 0.9% and sodium chloride 0.9%.  Current Outpatient Medications on File Prior to Visit   Medication Sig Dispense Refill    albuterol (VENTOLIN HFA) 90 mcg/actuation inhaler Inhale 2 puffs into the lungs every 6 (six) hours as needed for Wheezing or Shortness of Breath. Rescue 18 g 3    amLODIPine (NORVASC) 10 MG tablet TAKE ONE TABLET BY MOUTH EVERY DAY 30 tablet 11    ascorbic acid, vitamin C, (VITAMIN C) 500 MG tablet Take 1 tablet (500 mg total) by mouth 2 (two) times daily.      b complex vitamins tablet Take 1 tablet by mouth once daily.      calcium acetate (PHOSLO) 667 mg capsule Take 1 capsule (667 mg total) by mouth 3 (three) times daily. 90 capsule 11    diltiaZEM (CARDIZEM CD) 360 MG 24 hr capsule Take 1 capsule (360 mg total) by mouth once daily. 90 capsule 1    epoetin vikki (PROCRIT) 10,000 unit/mL injection Inject 1 mL (10,000 Units total) into the skin every Mon, Wed, Fri. To be given with HD      fish oil-omega-3 fatty acids 300-1,000 mg capsule Take 2 g by mouth once daily.      hydrALAZINE (APRESOLINE) 100 MG tablet TAKE ONE TABLET BY MOUTH THREE TIMES DAILY INCLUDING DIALYSIS DAYS 90 tablet 5    hydrOXYzine pamoate (VISTARIL) 25 MG Cap TAKE  ONE CAPSULE BY MOUTH EVERY DAY AS NEEDED FOR ITCHING 30 capsule 2    labetalol (NORMODYNE) 100 MG tablet TAKE ONE TABLET BY MOUTH THREE TIMES DAILY AS NEEDED FOR HEART RATE > 120 90 tablet 3    lisinopril (PRINIVIL,ZESTRIL) 40 MG tablet TAKE ONE TABLET BY MOUTH EVERY DAY 30 tablet 9    multivitamin with minerals tablet Take 1 tablet by mouth once daily.      omega-3 fatty acids-vitamin E (FISH OIL) 1,000 mg Cap Take 1 capsule by mouth once daily.  0    pantoprazole (PROTONIX) 40 MG tablet TAKE ONE TABLET BY MOUTH EVERY DAY 30 tablet 9    ETHAN-RACQUEL RX 1- mg-mg-mcg Tab TAKE ONE TABLET BY MOUTH EVERY DAY 90 tablet 2    tenofovir (VIREAD) 300 mg Tab Take 1 tablet (300 mg total) by mouth once a week. AFTER DIALYSIS 12 tablet 3    vitamin D (VITAMIN D3) 1000 units Tab Take 1,000 Units by mouth 3 (three) times a week.      warfarin (COUMADIN) 7.5 MG tablet Take 2 tablets by mouth on Thursdays and 1 tablet on all other days of the week. 35 tablet 2    enoxaparin (LOVENOX) 100 mg/mL Syrg Inject 0.9 mLs (90 mg total) into the skin once daily. (Patient not taking: Reported on 12/20/2019) 5 mL 0    [DISCONTINUED] furosemide (LASIX) 80 MG tablet TAKE ONE TABLET BY MOUTH EVERY DAY (Patient not taking: Reported on 2/18/2020) 30 tablet 9    [DISCONTINUED] zolpidem (AMBIEN) 5 MG Tab TAKE ONE TABLET BY MOUTH EACH EVENING AT BEDTIME AS NEEDED FOR INSOMNIA (Patient not taking: Reported on 12/20/2019) 30 tablet 4     Current Facility-Administered Medications on File Prior to Visit   Medication Dose Route Frequency Provider Last Rate Last Dose    0.9%  NaCl infusion   Intravenous Continuous Christopher Jane MD        sodium chloride 0.9% flush 10 mL  10 mL Intravenous PRN Christopher Jane MD         He has No Known Allergies..     Review of Systems   Respiratory: Positive for sleep disturbances due to breathing and snoring.    All other systems reviewed and are negative.     Objective:        Vitals:    02/18/20 1100  "  BP: (!) 146/82   Pulse: 77   Resp: 20   SpO2: 98%   Weight: 105.6 kg (232 lb 12.9 oz)   Height: 6' 1" (1.854 m)     Physical Exam   Constitutional: He is oriented to person, place, and time. Vital signs are normal. He appears well-developed and well-nourished.       HENT:   Head: Normocephalic and atraumatic.   Nose: Nose normal.   Mouth/Throat: Oropharynx is clear and moist.   Eyes: Pupils are equal, round, and reactive to light. EOM are normal.   Neck: Normal range of motion. Neck supple.   Cardiovascular: Normal rate, regular rhythm and normal heart sounds.   Pulmonary/Chest: Effort normal and breath sounds normal. No stridor. No respiratory distress. He has no wheezes.   Abdominal: Soft.   Musculoskeletal: Normal range of motion. He exhibits no edema.   Neurological: He is alert and oriented to person, place, and time. No cranial nerve deficit.   Skin: Skin is warm and dry. Capillary refill takes 2 to 3 seconds.   Psychiatric: He has a normal mood and affect.   Nursing note and vitals reviewed.        Data Review:   CBC:   Lab Results   Component Value Date    WBC 4.93 12/05/2019    RBC 2.62 (L) 12/05/2019    HGB 9.4 (L) 12/05/2019    HCT 29.0 (L) 12/05/2019    HCT 25 (L) 02/08/2017     (L) 12/05/2019     BMP:   Lab Results   Component Value Date     (H) 12/05/2019     12/05/2019    K 4.2 12/05/2019    CL 99 12/05/2019    CO2 30 (H) 12/05/2019    BUN 40 (H) 12/05/2019    CREATININE 12.2 (H) 12/05/2019    CALCIUM 9.2 12/05/2019     ABGs:   Lab Results   Component Value Date    PH 7.490 (H) 10/15/2019    PO2 88 10/15/2019    PCO2 38.8 10/15/2019     Diagnostics       Assessment:     Problem List Items Addressed This Visit     Hypertensive cardiomegaly with heart failure    Relevant Orders    Polysomnogram (CPAP will be added if patient meets diagnostic criteria.)    Pulmonary HTN    Relevant Orders    Polysomnogram (CPAP will be added if patient meets diagnostic criteria.)    ESRD (end stage " renal disease) on dialysis (Chronic)    Relevant Orders    Polysomnogram (CPAP will be added if patient meets diagnostic criteria.)    Essential hypertension    Relevant Orders    Polysomnogram (CPAP will be added if patient meets diagnostic criteria.)    Chronic pulmonary heart disease    Relevant Orders    Polysomnogram (CPAP will be added if patient meets diagnostic criteria.)    Abnormal diffusion capacity determined by pulmonary function test    Relevant Orders    Polysomnogram (CPAP will be added if patient meets diagnostic criteria.)    Mixed restrictive and obstructive lung disease    Relevant Orders    Polysomnogram (CPAP will be added if patient meets diagnostic criteria.)    DANIEL (obstructive sleep apnea) - Primary    Relevant Orders    Polysomnogram (CPAP will be added if patient meets diagnostic criteria.)    Nocturnal hypoxemia due to obstructive chronic bronchitis    Relevant Orders    Polysomnogram (CPAP will be added if patient meets diagnostic criteria.)    Pulmonary nodule, left    Relevant Orders    Polysomnogram (CPAP will be added if patient meets diagnostic criteria.)         Plan:     Requested Prescriptions      No prescriptions requested or ordered in this encounter       Orders Placed This Encounter   Procedures    Polysomnogram (CPAP will be added if patient meets diagnostic criteria.)     Standing Status:   Future     Standing Expiration Date:   2/17/2021     Patient is currently in stable compensated pulmonary status.  Current pulmonary interventions include p.r.n. short-acting beta agonists, continue nocturnal oxygen awaiting completed home sleep study, no recent respiratory infectious exacerbations since April 2019, spirometry have shown improvement in FEV1 and FVC.  No current pulmonary contraindications for transplant eligibility    Follow up follow up after sleep study.    This note was prepared using voice recognition system and is likely to have sound alike errors that may have  been overlooked even after proof reading.  Please call me with any questions    Discussed diagnosis, its evaluation, treatment and usual course. All questions answered.    Thank you for the courtesy of participating in the care of this patient    Hiam Urbina MD

## 2020-02-23 ENCOUNTER — HOSPITAL ENCOUNTER (OUTPATIENT)
Dept: SLEEP MEDICINE | Facility: HOSPITAL | Age: 50
Discharge: HOME | End: 2020-02-23
Attending: INTERNAL MEDICINE
Payer: MEDICARE

## 2020-02-23 DIAGNOSIS — J43.9 MIXED RESTRICTIVE AND OBSTRUCTIVE LUNG DISEASE: ICD-10-CM

## 2020-02-23 DIAGNOSIS — G47.36 NOCTURNAL HYPOXEMIA DUE TO OBSTRUCTIVE CHRONIC BRONCHITIS: ICD-10-CM

## 2020-02-23 DIAGNOSIS — G47.61 PERIODIC LIMB MOVEMENT DISORDER (PLMD): ICD-10-CM

## 2020-02-23 DIAGNOSIS — N18.6 ESRD (END STAGE RENAL DISEASE) ON DIALYSIS: Chronic | ICD-10-CM

## 2020-02-23 DIAGNOSIS — R06.83 PRIMARY SNORING: ICD-10-CM

## 2020-02-23 DIAGNOSIS — I27.20 PULMONARY HTN: ICD-10-CM

## 2020-02-23 DIAGNOSIS — Z99.2 ESRD (END STAGE RENAL DISEASE) ON DIALYSIS: Chronic | ICD-10-CM

## 2020-02-23 DIAGNOSIS — J44.89 NOCTURNAL HYPOXEMIA DUE TO OBSTRUCTIVE CHRONIC BRONCHITIS: ICD-10-CM

## 2020-02-23 DIAGNOSIS — I27.9 CHRONIC PULMONARY HEART DISEASE: ICD-10-CM

## 2020-02-23 DIAGNOSIS — J98.4 MIXED RESTRICTIVE AND OBSTRUCTIVE LUNG DISEASE: ICD-10-CM

## 2020-02-23 DIAGNOSIS — F51.5 NIGHTMARES: ICD-10-CM

## 2020-02-23 DIAGNOSIS — I11.0 HYPERTENSIVE CARDIOMEGALY WITH HEART FAILURE: ICD-10-CM

## 2020-02-23 DIAGNOSIS — R91.1 PULMONARY NODULE, LEFT: ICD-10-CM

## 2020-02-23 DIAGNOSIS — F51.04 PSYCHOPHYSIOLOGICAL INSOMNIA: ICD-10-CM

## 2020-02-23 DIAGNOSIS — R94.2 ABNORMAL DIFFUSION CAPACITY DETERMINED BY PULMONARY FUNCTION TEST: ICD-10-CM

## 2020-02-23 DIAGNOSIS — Z72.821 INADEQUATE SLEEP HYGIENE: ICD-10-CM

## 2020-02-23 DIAGNOSIS — I10 ESSENTIAL HYPERTENSION: ICD-10-CM

## 2020-02-23 PROCEDURE — 95810 POLYSOM 6/> YRS 4/> PARAM: CPT | Performed by: PSYCHOLOGIST

## 2020-02-23 PROCEDURE — 95810 POLYSOM 6/> YRS 4/> PARAM: CPT | Mod: 26,52,, | Performed by: PSYCHOLOGIST

## 2020-02-23 PROCEDURE — 95810 PR POLYSOMNOGRAPHY, 4 OR MORE: ICD-10-PCS | Mod: 26,52,, | Performed by: PSYCHOLOGIST

## 2020-02-25 ENCOUNTER — ANTI-COAG VISIT (OUTPATIENT)
Dept: CARDIOLOGY | Facility: CLINIC | Age: 50
End: 2020-02-25
Payer: MEDICARE

## 2020-02-25 DIAGNOSIS — Z95.2 S/P AVR (AORTIC VALVE REPLACEMENT): ICD-10-CM

## 2020-02-25 DIAGNOSIS — Z79.01 LONG TERM (CURRENT) USE OF ANTICOAGULANTS: Primary | ICD-10-CM

## 2020-02-25 DIAGNOSIS — Z79.01 ANTICOAGULATED: ICD-10-CM

## 2020-02-25 LAB — INR PPP: 3.2 (ref 2–3)

## 2020-02-25 PROCEDURE — 93793 PR ANTICOAGULANT MGMT FOR PT TAKING WARFARIN: ICD-10-PCS | Mod: ,,,

## 2020-02-25 PROCEDURE — 85610 PROTHROMBIN TIME: CPT | Mod: PBBFAC

## 2020-02-25 PROCEDURE — 93793 ANTICOAG MGMT PT WARFARIN: CPT | Mod: ,,,

## 2020-02-25 NOTE — PROGRESS NOTES
Patient's INR is slightly elevated at 3.2.  Reports an intake of pomegranate juice - interaction discussed.  Pomegranate juice will be d/c. No signs of any bleeding episode reported. Will lower today's (Tuesday) warfarin dose to 3.75 mg - only, then have patient to resume his regular scheduled dose of 15 mg every Thursday and 7.5 mg on all other days per week.  Recheck in 3 weeks.  Dose calendar given and reviewed with patient.  Advised to seek medical attention (ED) for any signs of abnormal bleeding, if needed. Patient voiced understanding.

## 2020-02-26 DIAGNOSIS — I10 ESSENTIAL HYPERTENSION: ICD-10-CM

## 2020-02-26 DIAGNOSIS — I27.20 PULMONARY HTN: Primary | ICD-10-CM

## 2020-02-26 DIAGNOSIS — I48.91 ATRIAL FIBRILLATION, UNSPECIFIED TYPE: ICD-10-CM

## 2020-02-26 DIAGNOSIS — R91.1 PULMONARY NODULE, LEFT: ICD-10-CM

## 2020-02-27 ENCOUNTER — HOSPITAL ENCOUNTER (OUTPATIENT)
Dept: CARDIOLOGY | Facility: HOSPITAL | Age: 50
Discharge: HOME | End: 2020-02-27
Attending: INTERNAL MEDICINE
Payer: MEDICARE

## 2020-02-27 ENCOUNTER — OFFICE VISIT (OUTPATIENT)
Dept: CARDIOLOGY | Facility: CLINIC | Age: 50
End: 2020-02-27
Payer: MEDICARE

## 2020-02-27 VITALS
HEIGHT: 73 IN | SYSTOLIC BLOOD PRESSURE: 142 MMHG | BODY MASS INDEX: 30.38 KG/M2 | WEIGHT: 229.25 LBS | OXYGEN SATURATION: 98 % | DIASTOLIC BLOOD PRESSURE: 76 MMHG | HEART RATE: 81 BPM

## 2020-02-27 DIAGNOSIS — I10 ESSENTIAL HYPERTENSION: ICD-10-CM

## 2020-02-27 DIAGNOSIS — I47.19 ATRIAL TACHYCARDIA: ICD-10-CM

## 2020-02-27 DIAGNOSIS — I27.20 PULMONARY HTN: ICD-10-CM

## 2020-02-27 DIAGNOSIS — Z98.890 HISTORY OF RADIOFREQUENCY ABLATION FOR COMPLEX RIGHT ATRIAL ARRHYTHMIA: ICD-10-CM

## 2020-02-27 DIAGNOSIS — I11.0 HYPERTENSIVE CARDIOMEGALY WITH HEART FAILURE: Primary | ICD-10-CM

## 2020-02-27 DIAGNOSIS — I48.91 ATRIAL FIBRILLATION, UNSPECIFIED TYPE: ICD-10-CM

## 2020-02-27 DIAGNOSIS — Z99.2 ESRD (END STAGE RENAL DISEASE) ON DIALYSIS: Chronic | ICD-10-CM

## 2020-02-27 DIAGNOSIS — Q24.9 ADULT CONGENITAL HEART DISEASE: ICD-10-CM

## 2020-02-27 DIAGNOSIS — Z95.2 S/P AVR (AORTIC VALVE REPLACEMENT): ICD-10-CM

## 2020-02-27 DIAGNOSIS — Z79.01 ANTICOAGULATED: ICD-10-CM

## 2020-02-27 DIAGNOSIS — I50.32 CHRONIC DIASTOLIC HEART FAILURE: ICD-10-CM

## 2020-02-27 DIAGNOSIS — R91.1 PULMONARY NODULE, LEFT: ICD-10-CM

## 2020-02-27 DIAGNOSIS — N52.2 DRUG-INDUCED ERECTILE DYSFUNCTION: ICD-10-CM

## 2020-02-27 DIAGNOSIS — N18.6 ESRD (END STAGE RENAL DISEASE) ON DIALYSIS: Chronic | ICD-10-CM

## 2020-02-27 DIAGNOSIS — I35.0 NONRHEUMATIC AORTIC VALVE STENOSIS: ICD-10-CM

## 2020-02-27 PROCEDURE — 99214 PR OFFICE/OUTPT VISIT, EST, LEVL IV, 30-39 MIN: ICD-10-PCS | Mod: S$PBB,,, | Performed by: INTERNAL MEDICINE

## 2020-02-27 PROCEDURE — 93010 EKG 12-LEAD: ICD-10-PCS | Mod: ,,, | Performed by: INTERNAL MEDICINE

## 2020-02-27 PROCEDURE — 93005 ELECTROCARDIOGRAM TRACING: CPT

## 2020-02-27 PROCEDURE — 99213 OFFICE O/P EST LOW 20 MIN: CPT | Mod: PBBFAC,25 | Performed by: INTERNAL MEDICINE

## 2020-02-27 PROCEDURE — 93010 ELECTROCARDIOGRAM REPORT: CPT | Mod: ,,, | Performed by: INTERNAL MEDICINE

## 2020-02-27 PROCEDURE — 99999 PR PBB SHADOW E&M-EST. PATIENT-LVL III: CPT | Mod: PBBFAC,,, | Performed by: INTERNAL MEDICINE

## 2020-02-27 PROCEDURE — 99999 PR PBB SHADOW E&M-EST. PATIENT-LVL III: ICD-10-PCS | Mod: PBBFAC,,, | Performed by: INTERNAL MEDICINE

## 2020-02-27 PROCEDURE — 99214 OFFICE O/P EST MOD 30 MIN: CPT | Mod: S$PBB,,, | Performed by: INTERNAL MEDICINE

## 2020-02-27 NOTE — PROGRESS NOTES
"Subjective:   Patient ID:  Isidro White Jr. is a 50 y.o. male who presents for cardiac consult of Chronic diastolic heart failure      Chest Pain    Associated symptoms include malaise/fatigue. Pertinent negatives include no palpitations or shortness of breath.   His past medical history is significant for CHF.   Congestive Heart Failure   Associated symptoms include chest pain. Pertinent negatives include no palpitations or shortness of breath.   Hypertension   Associated symptoms include chest pain and malaise/fatigue. Pertinent negatives include no palpitations or shortness of breath.     The patient came in today for cardiac consult of Chronic diastolic heart failure    Isidro White Jr. is a 50 y.o. male pt with ASD closure at age 8 ([facility]), NICM, s/p AVR with a 22 mm mechanical valve and TV annuloplasty with a 28 mm ring 3/17, HTN, SVT, EP performed RFA (3/13/17) and has been on HD - MWF since Feb 8,2016 since his surgery, referred by Dr Hurst (Nephrology) for evaluation and management of PH as he is being considered for kidney transplant, seen by Dr. Braun in [facility] here for CV follow up.     8/9/28  He had a RHC 6/18 showing normal cardiac output, normal filling pressures and MILD PH with mean pap 32 mmHg.  Pt feels very weak on HD days. After RHC HR has been in 109-110 range, started on cardizem. HR now normal. Pt felt palpitations now feels ok.     12/13/18  Holter from 9/4/18 revealed freq PVCs, dilt increased to 240 mg. No palpitations. Has been doing well lately, BP is well controlled. Labwork from HD has been normal. Still makes some urine, says he has some hematuria. Will need annual stress and 2D echo. He is also undergoing workup for HepB will see Dr. Youngblood.     3/15/19  Recent ER eval at [facility] - He presented with chest pain and shortness of breath. He is a dialysis patient has a history of anemia. He states "I believe that the lack of oxygen". Patient has received blood " transfusions in the past but not recently. Patient had dialysis yesterday and is a Monday Wednesday Friday dialysis patient. His troponin is mildly elevated. His chest x-ray is relatively clear. We do not see a large effusion and there is no obvious infiltrate at this time. Patient states at times he has chest pain and it feels like he is smothering. The safest plan is to admit the patient and repeat his cardiac enzymes and consider given the patient a blood transfusion. However the patient is a dialysis patient so we are unable to admit him at this facility. Patient nephrologist is an [facility] and [facility]  Pt Left AMA and now here for follow up.   Pt has SOB usually at night, feels suffocated and can hear himself snore and wakes up. No prior sleep study.      6/18/19  Pt will need EGD/Cscope. Will be bridged with heparin while holding coumadin and followed at coumadin clinic. Pt also needs liver biopsy, he will be ruled out for esophageal varicies. He also had recent admission for PNA, tx with abx. Now feels well, breathing normal. No CP/SOB.     9/19/19  Per recent workup - Fibroscan suggests cirrhosis and he has thrombocytopenia though no other findings of portal hypertension on imaging or EGD. OK to proceed with kidney transplant from hepatology standpoint. For renal transplant process- recent stress and echo are normal.     2/27/20  He was prescibed meds for ED - he was given Cialis 5 mg. Overall feels well, no CP/SOB. He was denied transplant recently but will have further workup next month with stress and echo. He will also need sleep study.   ECG - NSR, LAE, LAD, nonspec changes     Patient feels no chest pain, no leg swelling, no PND, no palpitation, no dizziness, no syncope, no CNS symptoms.    Patient has fairly good exercise tolerance.     Patient is compliant with medications.    3/10/20 Nuclear Stress    The perfusion scan is free of evidence from myocardial ischemia or  injury.    There is a  mild intensity fixed defect in the  wall of the left ventricle secondary to diaphragm attenuation.    There is a  intensity defect in the inferior wall of the left ventricle consistent with the RV insertion site.    Gated perfusion images showed an ejection fraction of 50% at rest and 50% post stress.    There is normal wall motion at rest and post stress.    The EKG portion of this study is negative for ischemia.    3/10/20 ECHO  · Mild concentric left ventricular hypertrophy.  · Moderate left atrial enlargement.  · Normal left ventricular systolic function. The estimated ejection fraction is 60%.  · Grade II (moderate) left ventricular diastolic dysfunction consistent with pseudonormalization.  · Normal right ventricular systolic function.  · There is a mechanical aortic valve present. Mean gradient is 38 mmHg.  · Mild mitral regurgitation.  · Mild tricuspid regurgitation.  · Normal central venous pressure (3 mmHg).  · The estimated PA systolic pressure is 42 mmHg.  · Pulmonary hypertension present.  · Mild mitral stenosis.  · Mild right ventricular enlargement.        9/4/18 HOLTER  TEST DESCRIPTION   PREDOMINANT RHYTHM  1. Sinus rhythm with heart rates varying between 69 and 124 bpm with an average of 85 bpm.   VENTRICULAR ARRHYTHMIAS  1. There were frequent PVCs totalling 1738 and averaging 36 per hour.  There were 9 bigeminal cycles.  There were 3 triplets.   2. There was one (6 beat) run of non-sustained ventricular tachycardia. The rate was 125 bpm.     SUPRA VENTRICULAR ARRHYTHMIAS  1. There were occasional PACs totalling 995 and averaging 20 per hour.  There were 9 triplets.   2. There were 4 (12 beat) runs of non-sustained SVT. The rate was 145 bpm.        Cath 2/17  1. Coronary angiography.      a.     Left main widely patent.      b.     LAD widely patent.      c.     Ramus widely patent and massive in size.      d.     Circumflex widely patent with a small OM 1 and OM 2  branch.      e.     Right coronary widely patent.  2. Hemodynamics:  Right heart catheterization.      a.     Pulmonary capillary wedge pressure 33 at end-expiration.      b.     Pulmonary artery pressure 78/42.      c.     Right ventricular pressure 70/21.      d.     Right atrial pressure 22 with a peak V-wave of 24.      e.     Cardiac output by thermodilution is between 6 and 7       L/minute, by Patricia 6.1 L/minute.    CONCLUSION  1. Normal coronary arteries.  2. Pulmonary hypertension.  3. Severe aortic insufficiency.      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     HD M, W, F    ESRD on dialysis     Hepatitis B     Hyperlipidemia     Hypertension     DANIEL (obstructive sleep apnea) 11/12/2019    Secondary hyperparathyroidism of renal origin     Supraventricular tachycardia     atrial tachycardia    Tachycardia     Valvular regurgitation     AI, TR       Past Surgical History:   Procedure Laterality Date    ASD REPAIR      age 8 [facility]    AV Graft Creation Left 03/2017    [facility]      [facility]     CARDIAC VALVE SURGERY  02/08/2017    22 mm Medtronic AV, 28 mm TV Medtronic annuloplaty ring    COLONOSCOPY N/A 8/27/2019    Procedure: COLONOSCOPY;  Surgeon: Addie John MD;  Location: [facility];  Service: Endoscopy;  Laterality: N/A;  dialysis pt *needs K*    ESOPHAGOGASTRODUODENOSCOPY N/A 8/27/2019    Procedure: EGD (ESOPHAGOGASTRODUODENOSCOPY);  Surgeon: Addie John MD;  Location: [facility];  Service: Endoscopy;  Laterality: N/A;    RADIOFREQUENCY ABLATION  03/13/2017    Atrial tachycardia       Social History     Tobacco Use    Smoking status: Never Smoker    Smokeless tobacco: Never Used   Substance Use Topics    Alcohol use: No     Frequency:  Never     Comment: quit in 2016; does have heavy drinking history; started drinking at age 13; was drinking a 12 pack over the weekend and two 24 ounces of beer a day during the week along with a pint of hard alcohol    Drug use: No       Family History   Problem Relation Age of Onset    Leukemia Mother     Heart attack Father         massive MI at age 67    No Known Problems Sister     No Known Problems Brother     No Known Problems Sister     No Known Problems Sister     Heart failure Paternal Grandmother     Diabetes Paternal Aunt     Hypertension Paternal Aunt     Leukemia Paternal Aunt         CLL    Suicide Paternal Uncle     Anesthesia problems Paternal Uncle     Diabetes Paternal Aunt     Stroke Paternal Aunt     Valvular heart disease Maternal Aunt     Kidney disease Neg Hx     Colon cancer Neg Hx        Patient's Medications   New Prescriptions    No medications on file   Previous Medications    ALBUTEROL (VENTOLIN HFA) 90 MCG/ACTUATION INHALER    Inhale 2 puffs into the lungs every 6 (six) hours as needed for Wheezing or Shortness of Breath. Rescue    AMLODIPINE (NORVASC) 10 MG TABLET    TAKE ONE TABLET BY MOUTH EVERY DAY    ASCORBIC ACID, VITAMIN C, (VITAMIN C) 500 MG TABLET    Take 1 tablet (500 mg total) by mouth 2 (two) times daily.    B COMPLEX VITAMINS TABLET    Take 1 tablet by mouth once daily.    CALCIUM ACETATE (PHOSLO) 667 MG CAPSULE    Take 1 capsule (667 mg total) by mouth 3 (three) times daily.    DILTIAZEM (CARDIZEM CD) 360 MG 24 HR CAPSULE    Take 1 capsule (360 mg total) by mouth once daily.    ENOXAPARIN (LOVENOX) 100 MG/ML SYRG    Inject 0.9 mLs (90 mg total) into the skin once daily.    EPOETIN TORRIE (PROCRIT) 10,000 UNIT/ML INJECTION    Inject 1 mL (10,000 Units total) into the skin every Mon, Wed, Fri. To be given with HD    FISH OIL-OMEGA-3 FATTY ACIDS 300-1,000 MG CAPSULE    Take 2 g by mouth once daily.    HYDRALAZINE (APRESOLINE) 100 MG TABLET    TAKE ONE TABLET  BY MOUTH THREE TIMES DAILY INCLUDING DIALYSIS DAYS    HYDROXYZINE PAMOATE (VISTARIL) 25 MG CAP    TAKE ONE CAPSULE BY MOUTH EVERY DAY AS NEEDED FOR ITCHING    LABETALOL (NORMODYNE) 100 MG TABLET    TAKE ONE TABLET BY MOUTH THREE TIMES DAILY AS NEEDED FOR HEART RATE > 120    LISINOPRIL (PRINIVIL,ZESTRIL) 40 MG TABLET    TAKE ONE TABLET BY MOUTH EVERY DAY    MULTIVITAMIN WITH MINERALS TABLET    Take 1 tablet by mouth once daily.    OMEGA-3 FATTY ACIDS-VITAMIN E (FISH OIL) 1,000 MG CAP    Take 1 capsule by mouth once daily.    PANTOPRAZOLE (PROTONIX) 40 MG TABLET    TAKE ONE TABLET BY MOUTH EVERY DAY    ETHAN-RACQUEL RX 1- MG-MG-MCG TAB    TAKE ONE TABLET BY MOUTH EVERY DAY    TENOFOVIR (VIREAD) 300 MG TAB    Take 1 tablet (300 mg total) by mouth once a week. AFTER DIALYSIS    VITAMIN D (VITAMIN D3) 1000 UNITS TAB    Take 1,000 Units by mouth 3 (three) times a week.    WARFARIN (COUMADIN) 7.5 MG TABLET    Take 2 tablets by mouth on Thursdays and 1 tablet on all other days of the week.   Modified Medications    No medications on file   Discontinued Medications    No medications on file       Review of Systems   Constitutional: Positive for malaise/fatigue.   HENT: Negative.    Eyes: Negative.    Respiratory: Negative for shortness of breath.    Cardiovascular: Positive for chest pain. Negative for palpitations.   Gastrointestinal: Negative.    Genitourinary: Positive for hematuria.   Musculoskeletal: Negative.    Skin: Negative.    Neurological: Negative.    Endo/Heme/Allergies: Negative.    Psychiatric/Behavioral: Negative.    All 12 systems otherwise negative.      Wt Readings from Last 3 Encounters:   02/27/20 104 kg (229 lb 4.5 oz)   02/18/20 105.6 kg (232 lb 12.9 oz)   12/20/19 103.9 kg (229 lb 0.9 oz)     Temp Readings from Last 3 Encounters:   12/20/19 96.8 °F (36 °C) (Tympanic)   10/05/19 98 °F (36.7 °C) (Oral)   10/02/19 96.3 °F (35.7 °C) (Tympanic)     BP Readings from Last 3 Encounters:   02/27/20 (!)  "142/76   02/18/20 (!) 146/82   12/20/19 130/82     Pulse Readings from Last 3 Encounters:   02/27/20 81   02/18/20 77   12/20/19 76       BP (!) 142/76 (BP Location: Right arm, Patient Position: Sitting, BP Method: Large (Manual))   Pulse 81   Ht 6' 1" (1.854 m)   Wt 104 kg (229 lb 4.5 oz)   SpO2 98%   BMI 30.25 kg/m²      Objective:   Physical Exam   Constitutional: He is oriented to person, place, and time. He appears well-developed and well-nourished. No distress.   HENT:   Head: Normocephalic and atraumatic.   Nose: Nose normal.   Mouth/Throat: Oropharynx is clear and moist.   Eyes: Conjunctivae and EOM are normal. No scleral icterus.   Neck: Normal range of motion. Neck supple. No JVD present. No thyromegaly present.   Cardiovascular: Normal rate, regular rhythm, S1 normal and S2 normal. Exam reveals no gallop, no S3, no S4 and no friction rub.   Murmur heard.   Midsystolic murmur is present at the upper right sternal border.  Island S2   Pulmonary/Chest: Effort normal and breath sounds normal. No stridor. No respiratory distress. He has no wheezes. He has no rales. He exhibits no tenderness.   Abdominal: Soft. Bowel sounds are normal. He exhibits no distension and no mass. There is no tenderness. There is no rebound.   Genitourinary:   Genitourinary Comments: Deferred   Musculoskeletal: Normal range of motion. He exhibits no edema, tenderness or deformity.   Lymphadenopathy:     He has no cervical adenopathy.   Neurological: He is alert and oriented to person, place, and time. He exhibits normal muscle tone. Coordination normal.   Skin: Skin is warm and dry. No rash noted. He is not diaphoretic. No erythema. No pallor.   Psychiatric: He has a normal mood and affect. His behavior is normal. Judgment and thought content normal.   Nursing note and vitals reviewed.      Lab Results   Component Value Date     12/05/2019    K 4.2 12/05/2019    CL 99 12/05/2019    CO2 30 (H) 12/05/2019    BUN 40 (H) " 12/05/2019    CREATININE 12.2 (H) 12/05/2019     (H) 12/05/2019    HGBA1C 4.8 09/20/2019    MG 2.1 04/30/2019    AST 14 12/05/2019    ALT 20 12/05/2019    ALBUMIN 3.5 12/05/2019    PROT 7.9 12/05/2019    BILITOT 0.6 12/05/2019    WBC 4.93 12/05/2019    HGB 9.4 (L) 12/05/2019    HCT 29.0 (L) 12/05/2019    HCT 25 (L) 02/08/2017     (H) 12/05/2019     (L) 12/05/2019    INR 3.2 (A) 02/25/2020    INR 2.1 (H) 12/05/2019    CHOL 213 (H) 01/08/2019    HDL 50 01/08/2019    LDLCALC 142.4 01/08/2019    TRIG 103 01/08/2019     (H) 04/17/2019     Assessment:      1. Hypertensive cardiomegaly with heart failure    2. Nonrheumatic aortic valve stenosis    3. Adult congenital heart disease    4. S/P AVR (aortic valve replacement)    5. Chronic diastolic heart failure    6. Atrial tachycardia    7. History of radiofrequency ablation for complex right atrial arrhythmia    8. Essential hypertension        Plan:   1. Chronic Diastolic HF  - recovered EF  - cont low salt diet  - pt euvolemic     2. S/p mech AVR  - cont coumadin, check INR  - f/u with coumadin clinic here    3. ESRD  - cont HD  - stable CV status for transplant, recent stress/echo negative  - repeat stress echo in 6 months    4. HTN  - cont meds    6. Tachycardia sec to PVCs  - cont BB and CCB    7. HLD  - cont statin    8 . Pre-OP CV evaluation prior to renal transplant  Moderate periop risk of CV events for high risk procedure.  Good functional and exercise capacity.  No chest pain, active arrhythmia and CHF symptoms.  Continue BB and Statin periop.  Need to have stress and echo repeat in 3/2021    9. ED  - ok to start treatment - rec Cialis  - monitor BP, avoid nitrates     Thank you for allowing me to participate in this patient's care. Please do not hesitate to contact me with any questions or concerns. Consult note has been forwarded to the referral physician.

## 2020-03-09 NOTE — PROCEDURES
Patient Name: Isidro White  Date of Report: 3-9-20 Date of PS2020   [facility] No.: [id number]   : 1970                      Time of PS:54:57 PM - 1:55:36 AM  Sex:  Male   Age:  50   Weight:  218.0 lbs Height:  6  0.8          Type of PSG:  Diagnostic     REASONS FOR REFERRAL: Mr. White is a 50 year old male, referred for diagnostic polysomnography by Dr. Hima Urbina, based on the patients reported snoring, gasping during sleep, observed respiratory pauses in sleep, and daytime hypersomnolence.  His [place] Sleepiness Scale score (19) was 14, clinically significant; current ESS = 9, just short of clinical significance.  He attempted a diagnostic PSG on 19 but was unable to sleep for more than 30 min before ending the study, during which he did not have any respiratory events.  Dr. Miguel Campo is the patients primary care physician.    STUDY PARAMETERS: This diagnostic study involved analysis of the patient's sleep pattern while breathing unassisted. The study was performed with a sleep technologist in attendance for the entire test period, with video monitoring throughout the study, and routine laboratory clinical parameters recorded:  NOTE: The polysomnography electrophysiological record for the patient has been reviewed in its entirety by Dr. Roberts.    SUMMARY STATEMENTS  DIAGNOSTIC IMPRESSIONS  F51.04   / 307.42  Psychophysiological Insomnia (stress - related and / or conditioned)    F51.5     / 307.47  Nightmare Disorder  G47.61  / 327.51  Mild Periodic Limb Movement (PLM) Disorder   R06.83  / 780.53-1  Primary Snoring (sporadic, moderate during polysomnography)  Z72.821 / V69.4  Inadequate Sleep Hygiene  F51.12   / 307.44  r/o Insufficient Sleep Syndrome  G47.26  /  327.36  r/o Shift Work Disorder    G47.21  /  327.31  r/o Delayed Sleep - Wake Phase Disorder  G47.52   / 327.42  r/o REM Sleep Behavior Disorder      TREATMENT RECOMMENDATIONS  Treat, or refer to  Sleep Disorders Center.  1. The diagnostic polysomnography revealed no diagnosable  obstructive sleep apnea / hypopnea syndrome (A + H Index = 0.0 events / hr asleep with no respiratory event - related arousals or RERAs (respiratory effort -  related arousals).  The mean SpO2 value was 90.8 %, moderate, minimum oxygen saturation during sleep was 87.0 %, and waking baseline SpO2 was   98 %.  Sporadic, moderately loud snoring was noted.  A  CPAP titration polysomnography is not recommended.      2. This was Mr. Randall second PSG with insufficient or marginally sufficient sleep (30 min in his 9-8-19 PSG out of 98 min in bed before halting the study, 88   3. If treatment of snoring (sporadic,  moderately loud  during the PSG) is desired, consider a reversible treatment such as a dental oral device.  If a permanent procedure such as UPPP is preferred, periodic polysomnography may be needed because signs of worsening apnea could be missed (silent apneas) if DANIEL develops or becomes more severe.  4. Mr. White is taking zolpidem / Ambien, an hypnotic medication that may exacerbate OSAHS.   5. Weight loss to the normal range is recommended as it can decrease respiratory events and snoring in overweight patients.  6. The following changes in sleep hygiene / sleep - related behavior are recommended after medical treatments are successful   Regular bedtimes and wake times, including weekends: Total sleep time / night should not be more than one hour more             than usual, and bedtime or wake time should not be more than one hour earlier or later than usual.     Do not attempt to make up lost sleep by extending sleep periods.     Avoid exercising vigorously within an hour of bedtime.   Only relaxing activities 1 - 2 hrs before bedtime (no work).  7. A mild PLM disorder was observed (PLMS Index = 21.5 / hr asleep,  but  treatment might not be optimal because   the PLMS were not at all disruptive of sleep (only 0.9  arousals / hr asleep), and there were no signs of restless legs syndrome in the SDI, H & P or PSG;  consider treatment of PLM disorder only if PLMS symptoms are sufficiently bothersome to the patient.  Note that the benzodiazepine medications sometimes used to treat PLM disorder (e.g., clonazepam) may exacerbate some sleep - related respiratory disorders, and that dopaminergic medications such as Mirapex and Requip can be  used in such instances.    8. If  insomnia persists after treatments for medical sleep disorders have proven effective, recommend  follow - up inquiry regarding frequency, duration and nature of reported sleep loss (?),  and delayed sleep onset (stress - related and / or conditioned psychophysiological insomnia,  r/o  delayed sleep - wake sleep phase disorder) and referral for behavioral and cognitive / behavioral treatment of insomnia, as indicated.  Please see SDI.  9. Rule out possible shift work sleep disorder.  Follow - up inquiry regarding shift work schedule to determine  if this interferes with sleep quantity or quality sufficiently to require treatment ( e.g., working 7 - 9 nights / mo is borderline frequency, has normal circadian cycle 22 - 24 days / month), but  rotating shifts (sometimes sleep days and nights in the same week) are the worst for sufficient sleep and adequately restorative sleep.  Determine: How many night shifts / month ?;  how often sleep days ? / sleep  nights ?;  is it a rotating work shift schedule (days  and  nights) ?;  How many days / month  feel sleep deprived ?  Note:  Daytime sleep  and  changing sleep / wake cycles for days off and workdays,  both  tend to reduce sleep time and impair sleep quality, often resulting in hypersomnolence.    10. Follow - up inquiry regarding possible REM sleep behavior disorder (please see SDI).       See below for a complete interpretation of data from the polysomnography and Sleep Disorders Inventory.     Thank you for  referring this patient to the [facility].      Zafar Roberts, Ph.D., ABPP; Diplomate, American Board of Sleep Medicine

## 2020-03-10 ENCOUNTER — HOSPITAL ENCOUNTER (OUTPATIENT)
Dept: PULMONOLOGY | Facility: HOSPITAL | Age: 50
Discharge: HOME | End: 2020-03-10
Attending: INTERNAL MEDICINE
Payer: MEDICARE

## 2020-03-10 ENCOUNTER — HOSPITAL ENCOUNTER (OUTPATIENT)
Dept: CARDIOLOGY | Facility: HOSPITAL | Age: 50
Discharge: HOME | End: 2020-03-10
Attending: INTERNAL MEDICINE
Payer: MEDICARE

## 2020-03-10 ENCOUNTER — HOSPITAL ENCOUNTER (OUTPATIENT)
Dept: RADIOLOGY | Facility: HOSPITAL | Age: 50
Discharge: HOME | End: 2020-03-10
Attending: INTERNAL MEDICINE
Payer: MEDICARE

## 2020-03-10 VITALS
HEART RATE: 81 BPM | DIASTOLIC BLOOD PRESSURE: 76 MMHG | WEIGHT: 229 LBS | WEIGHT: 229 LBS | SYSTOLIC BLOOD PRESSURE: 146 MMHG | HEIGHT: 73 IN | BODY MASS INDEX: 30.35 KG/M2 | SYSTOLIC BLOOD PRESSURE: 142 MMHG | HEART RATE: 70 BPM | DIASTOLIC BLOOD PRESSURE: 67 MMHG | HEIGHT: 73 IN | BODY MASS INDEX: 30.35 KG/M2

## 2020-03-10 DIAGNOSIS — I11.0 HYPERTENSIVE CARDIOMEGALY WITH HEART FAILURE: ICD-10-CM

## 2020-03-10 DIAGNOSIS — R07.89 OTHER CHEST PAIN: ICD-10-CM

## 2020-03-10 DIAGNOSIS — Z01.810 PREOP CARDIOVASCULAR EXAM: ICD-10-CM

## 2020-03-10 DIAGNOSIS — Z01.31 ENCOUNTER FOR EXAMINATION OF BLOOD PRESSURE WITH ABNORMAL FINDINGS: ICD-10-CM

## 2020-03-10 LAB
AORTIC ROOT ANNULUS: 3.65 CM
AV INDEX (PROSTH): 0.54
AV MEAN GRADIENT: 38 MMHG
AV PEAK GRADIENT: 57 MMHG
AV VALVE AREA: 1.63 CM2
AV VELOCITY RATIO: 0.44
BSA FOR ECHO PROCEDURE: 2.31 M2
CV ECHO LV RWT: 0.64 CM
CV STRESS BASE HR: 68 BPM
DIASTOLIC BLOOD PRESSURE: 67 MMHG
DOP CALC AO PEAK VEL: 3.79 M/S
DOP CALC AO VTI: 70.51 CM
DOP CALC LVOT AREA: 3 CM2
DOP CALC LVOT DIAMETER: 1.96 CM
DOP CALC LVOT PEAK VEL: 1.65 M/S
DOP CALC LVOT STROKE VOLUME: 114.63 CM3
DOP CALCLVOT PEAK VEL VTI: 38.01 CM
E WAVE DECELERATION TIME: 174.17 MSEC
E/A RATIO: 1.77
E/E' RATIO: 17.7 M/S
ECHO LV POSTERIOR WALL: 1.54 CM (ref 0.6–1.1)
FRACTIONAL SHORTENING: 34 % (ref 28–44)
INTERVENTRICULAR SEPTUM: 1.44 CM (ref 0.6–1.1)
IVRT: 51.9 MSEC
LA MAJOR: 5.86 CM
LA MINOR: 4.09 CM
LA WIDTH: 5.66 CM
LEFT ATRIUM SIZE: 4.15 CM
LEFT ATRIUM VOLUME INDEX: 42.2 ML/M2
LEFT ATRIUM VOLUME: 96.19 CM3
LEFT INTERNAL DIMENSION IN SYSTOLE: 3.17 CM (ref 2.1–4)
LEFT VENTRICLE DIASTOLIC VOLUME INDEX: 47.59 ML/M2
LEFT VENTRICLE DIASTOLIC VOLUME: 108.47 ML
LEFT VENTRICLE MASS INDEX: 133 G/M2
LEFT VENTRICLE SYSTOLIC VOLUME INDEX: 17.6 ML/M2
LEFT VENTRICLE SYSTOLIC VOLUME: 40.1 ML
LEFT VENTRICULAR INTERNAL DIMENSION IN DIASTOLE: 4.82 CM (ref 3.5–6)
LEFT VENTRICULAR MASS: 302.26 G
LV LATERAL E/E' RATIO: 16.09 M/S
LV SEPTAL E/E' RATIO: 19.67 M/S
MV PEAK A VEL: 1 M/S
MV PEAK E VEL: 1.77 M/S
NUC REST DIASTOLIC VOLUME INDEX: 271
NUC REST EJECTION FRACTION: 50
NUC REST SYSTOLIC VOLUME INDEX: 136
NUC STRESS DIASTOLIC VOLUME INDEX: 268
NUC STRESS EJECTION FRACTION: 50 %
NUC STRESS SYSTOLIC VOLUME INDEX: 133
OHS CV CPX 85 PERCENT MAX PREDICTED HEART RATE MALE: 145
OHS CV CPX MAX PREDICTED HEART RATE: 171
OHS CV CPX PATIENT IS FEMALE: 0
OHS CV CPX PATIENT IS MALE: 1
OHS CV CPX PEAK DIASTOLIC BLOOD PRESSURE: 67 MMHG
OHS CV CPX PEAK HEAR RATE: 90 BPM
OHS CV CPX PEAK RATE PRESSURE PRODUCT: NORMAL
OHS CV CPX PEAK SYSTOLIC BLOOD PRESSURE: 146 MMHG
OHS CV CPX PERCENT MAX PREDICTED HEART RATE ACHIEVED: 53
OHS CV CPX RATE PRESSURE PRODUCT PRESENTING: 9928
PISA TR MAX VEL: 3.14 M/S
RA MAJOR: 4.24 CM
RA PRESSURE: 3 MMHG
RA WIDTH: 3.76 CM
SINUS: 3.91 CM
STJ: 3.4 CM
STRESS ECHO TARGET HR: 145 BPM
SYSTOLIC BLOOD PRESSURE: 146 MMHG
TDI LATERAL: 0.11 M/S
TDI SEPTAL: 0.09 M/S
TDI: 0.1 M/S
TR MAX PG: 39 MMHG
TRICUSPID ANNULAR PLANE SYSTOLIC EXCURSION: 1.91 CM
TV REST PULMONARY ARTERY PRESSURE: 42 MMHG

## 2020-03-10 PROCEDURE — 93306 TTE W/DOPPLER COMPLETE: CPT | Mod: 26,,, | Performed by: INTERNAL MEDICINE

## 2020-03-10 PROCEDURE — A9502 TC99M TETROFOSMIN: HCPCS

## 2020-03-10 PROCEDURE — 63600175 PHARM REV CODE 636 W HCPCS: Performed by: NURSE PRACTITIONER

## 2020-03-10 PROCEDURE — 93306 TTE W/DOPPLER COMPLETE: CPT

## 2020-03-10 PROCEDURE — 93306 ECHO (CUPID ONLY): ICD-10-PCS | Mod: 26,,, | Performed by: INTERNAL MEDICINE

## 2020-03-10 RX ORDER — REGADENOSON 0.08 MG/ML
0.4 INJECTION, SOLUTION INTRAVENOUS ONCE
Status: COMPLETED | OUTPATIENT
Start: 2020-03-10 | End: 2020-03-10

## 2020-03-10 RX ADMIN — REGADENOSON 0.4 MG: 0.08 INJECTION, SOLUTION INTRAVENOUS at 02:03

## 2020-03-11 ENCOUNTER — TELEPHONE (OUTPATIENT)
Dept: CARDIOLOGY | Facility: CLINIC | Age: 50
End: 2020-03-11

## 2020-03-11 NOTE — TELEPHONE ENCOUNTER
----- Message from Fuad Gudino MD sent at 3/11/2020  2:40 PM CDT -----  Please call the patient regarding his result. Normal heart and stable aortic valve function. Normal nuclear stress test with normal heart function. Proceed on transplant list.

## 2020-03-12 ENCOUNTER — TELEPHONE (OUTPATIENT)
Dept: PULMONOLOGY | Facility: CLINIC | Age: 50
End: 2020-03-12

## 2020-03-19 ENCOUNTER — ANTI-COAG VISIT (OUTPATIENT)
Dept: CARDIOLOGY | Facility: CLINIC | Age: 50
End: 2020-03-19
Payer: MEDICARE

## 2020-03-19 DIAGNOSIS — Z79.01 LONG TERM (CURRENT) USE OF ANTICOAGULANTS: Primary | ICD-10-CM

## 2020-03-19 DIAGNOSIS — Z95.2 S/P AVR (AORTIC VALVE REPLACEMENT): ICD-10-CM

## 2020-03-19 LAB — INR PPP: 1.3 (ref 2–3)

## 2020-03-19 PROCEDURE — 85610 PROTHROMBIN TIME: CPT | Mod: PBBFAC

## 2020-03-19 PROCEDURE — 93793 PR ANTICOAGULANT MGMT FOR PT TAKING WARFARIN: ICD-10-PCS | Mod: ,,,

## 2020-03-19 PROCEDURE — 93793 ANTICOAG MGMT PT WARFARIN: CPT | Mod: ,,,

## 2020-03-19 NOTE — PROGRESS NOTES
Patient's INR is sub-therapeutic at 1.3.  Mr. White states he ate greens and cabbage recently.  Importance of consistent vitamin K intake discussed.   No abnormal numbness or weakness noted.  Instructions given for patient to take warfarin 15mg on today and tomorrow (boosted dose); then resume current dose of warfarin 15mg on Thursdays and 7.5mg on all other days of the week.  Patient voiced understanding.  Follow-up in 1 week.

## 2020-03-23 DIAGNOSIS — Z79.01 LONG TERM (CURRENT) USE OF ANTICOAGULANTS: Primary | ICD-10-CM

## 2020-03-26 ENCOUNTER — ANTI-COAG VISIT (OUTPATIENT)
Dept: CARDIOLOGY | Facility: CLINIC | Age: 50
End: 2020-03-26
Payer: MEDICARE

## 2020-03-26 ENCOUNTER — LAB VISIT (OUTPATIENT)
Dept: LAB | Facility: HOSPITAL | Age: 50
End: 2020-03-26
Attending: INTERNAL MEDICINE
Payer: MEDICARE

## 2020-03-26 DIAGNOSIS — Z79.01 LONG TERM (CURRENT) USE OF ANTICOAGULANTS: ICD-10-CM

## 2020-03-26 DIAGNOSIS — Z95.2 S/P AVR (AORTIC VALVE REPLACEMENT): ICD-10-CM

## 2020-03-26 DIAGNOSIS — I48.91 ATRIAL FIBRILLATION, UNSPECIFIED TYPE: ICD-10-CM

## 2020-03-26 LAB
INR PPP: 1.8 (ref 0.8–1.2)
PROTHROMBIN TIME: 19.2 SEC (ref 9–12.5)

## 2020-03-26 PROCEDURE — 36415 COLL VENOUS BLD VENIPUNCTURE: CPT

## 2020-03-26 PROCEDURE — 85610 PROTHROMBIN TIME: CPT

## 2020-03-26 PROCEDURE — 93793 ANTICOAG MGMT PT WARFARIN: CPT | Mod: ,,,

## 2020-03-26 PROCEDURE — 93793 PR ANTICOAGULANT MGMT FOR PT TAKING WARFARIN: ICD-10-PCS | Mod: ,,,

## 2020-03-26 NOTE — PROGRESS NOTES
Patient's INR has improved but remains  sub-therapeutic at 1.8.  Patient contacted.  Mr. White states he has been eating more vegetables - he will attempt to be consistent with 1-2 servings per week but can't guarantee due to uncertainty of food availability.    No abnormal numbness or weakness noted.  Instructions given for patient to increase dose of warfarin to 15mg on Thursdays and Saturdays; and 7.5mg on all other days of the week.  Patient repeated directions and voiced understanding.  Follow-up in 2 weeks with labs ([facility]).

## 2020-04-03 RX ORDER — VIT B COMP NO.3/FOLIC/C/BIOTIN 1 MG-60 MG
TABLET ORAL
Qty: 90 TABLET | Refills: 1 | Status: SHIPPED | OUTPATIENT
Start: 2020-04-03 | End: 2023-12-12 | Stop reason: SDUPTHER

## 2020-04-09 ENCOUNTER — ANTI-COAG VISIT (OUTPATIENT)
Dept: CARDIOLOGY | Facility: CLINIC | Age: 50
End: 2020-04-09
Payer: MEDICARE

## 2020-04-09 ENCOUNTER — LAB VISIT (OUTPATIENT)
Dept: LAB | Facility: HOSPITAL | Age: 50
End: 2020-04-09
Attending: INTERNAL MEDICINE
Payer: MEDICARE

## 2020-04-09 DIAGNOSIS — Z95.2 S/P AVR (AORTIC VALVE REPLACEMENT): ICD-10-CM

## 2020-04-09 DIAGNOSIS — Z79.01 ANTICOAGULATED: ICD-10-CM

## 2020-04-09 DIAGNOSIS — Z79.01 LONG TERM (CURRENT) USE OF ANTICOAGULANTS: ICD-10-CM

## 2020-04-09 LAB
INR PPP: 1.4 (ref 0.8–1.2)
PROTHROMBIN TIME: 14.5 SEC (ref 9–12.5)

## 2020-04-09 PROCEDURE — 85610 PROTHROMBIN TIME: CPT

## 2020-04-09 PROCEDURE — 93793 PR ANTICOAGULANT MGMT FOR PT TAKING WARFARIN: ICD-10-PCS | Mod: ,,,

## 2020-04-09 PROCEDURE — 93793 ANTICOAG MGMT PT WARFARIN: CPT | Mod: ,,,

## 2020-04-09 PROCEDURE — 36415 COLL VENOUS BLD VENIPUNCTURE: CPT

## 2020-04-09 NOTE — PROGRESS NOTES
Patient contacted:  INR is subtherapeutic at 1.4 (lab).  Warfarin dose was increased on last INR testing.  Patient reports x 2 doses missed.  No signs or symptoms noted.  Forwarded to Mana Fuller PharmD for review/dosing.

## 2020-04-09 NOTE — PROGRESS NOTES
MA notes reviewed. INR has been low and patient should be advised on the extreme importance of medication compliance 2/2 risk of stroke. Will boost dose with quick INR follow-up (cannot bridge 2/2 ESRD).

## 2020-04-11 ENCOUNTER — DOCUMENTATION ONLY (OUTPATIENT)
Dept: NEPHROLOGY | Facility: CLINIC | Age: 50
End: 2020-04-11

## 2020-04-11 NOTE — PROGRESS NOTES
History & Physical      Chief Complaint:  H&P    HPI:        Patient is an  male with ESRD on HD MWF at the [facility].          ROS:        Constitutional: Negative for fever, chills, weight loss, malaise/fatigue and diaphoresis.   HENT: Negative for hearing loss, ear pain, nosebleeds, congestion, sore throat, neck pain, tinnitus and ear discharge.    Eyes: Negative for blurred vision, double vision, photophobia, pain, discharge and redness.   Respiratory: Negative for cough, hemoptysis, sputum production, shortness of breath, wheezing and stridor.    Cardiovascular: Negative for chest pain, palpitations, orthopnea, claudication, leg swelling and PND.   Gastrointestinal: Negative for heartburn, nausea, vomiting, abdominal pain, diarrhea, constipation, blood in stool and melena.   Genitourinary: Negative for dysuria, urgency, frequency, hematuria and flank pain.   Musculoskeletal: Negative for myalgias, back pain, joint pain and falls.   Skin: Negative for itching and rash.   Neurological: Negative for dizziness, tingling, tremors, sensory change, speech change, focal weakness, seizures, loss of consciousness, weakness and headaches.   Endo/Heme/Allergies: Negative for environmental allergies and polydipsia. Does not bruise/bleed easily.   Psychiatric/Behavioral: Negative for depression, suicidal ideas, hallucinations, memory loss and substance abuse. The patient is not nervous/anxious and does not have insomnia.    All 14 systems reviewed and negative except as noted above.  Balance of review of systems is negative.           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     HD M, W, F    ESRD on dialysis     Hepatitis B     Hyperlipidemia      Hypertension     DANIEL (obstructive sleep apnea) 11/12/2019    Secondary hyperparathyroidism of renal origin     Supraventricular tachycardia     atrial tachycardia    Tachycardia     Valvular regurgitation     AI, TR       Past Surgical History:   Procedure Laterality Date    ASD REPAIR      age 8 [facility]    AV Graft Creation Left 03/2017    CARD[facility]AC [facility]TERIZATION      [facility]     CARDIAC VALVE SURGERY  02/08/2017    22 mm Medtronic AV, 28 mm TV Medtronic annuloplaty ring    COLONOSCOPY N/A 8/27/2019    Procedure: COLONOSCOPY;  Surgeon: Addie John MD;  Location: [facility] END[facility];  Service: Endoscopy;  Laterality: N/A;  dialysis pt *needs K*    ESOPHAGOGASTRODUODENOSCOPY N/A 8/27/2019    Procedure: EGD (ESOPHAGOGASTRODUODENOSCOPY);  Surgeon: Addie John MD;  Location: [facility] ENDO;  Service: Endoscopy;  Laterality: N/A;    RADIOFREQUENCY ABLATION  03/13/2017    Atrial tachycardia       Family History   Problem Relation Age of Onset    Leukemia Mother     Heart attack Father         massive MI at age 67    No Known Problems Sister     No Known Problems Brother     No Known Problems Sister     No Known Problems Sister     Heart failure Paternal Grandmother     Diabetes Paternal Aunt     Hypertension Paternal Aunt     Leukemia Paternal Aunt         CLL    Suicide Paternal Uncle     Anesthesia problems Paternal Uncle     Diabetes Paternal Aunt     Stroke Paternal Aunt     Valvular heart disease Maternal Aunt     Kidney disease Neg Hx     Colon cancer Neg Hx        Social History     Socioeconomic History    Marital status: Single     Spouse name: Not on file    Number of children: Not on file    Years of education: Not on file    Highest education level: Not on file   Occupational History    Not on file   Social Needs    Financial resource strain: Not on file    Food insecurity:     Worry: Not on file     Inability: Not on file    Transportation needs:     Medical: Not  on file     Non-medical: Not on file   Tobacco Use    Smoking status: Never Smoker    Smokeless tobacco: Never Used   Substance and Sexual Activity    Alcohol use: No     Frequency: Never     Comment: quit in 2016; does have heavy drinking history; started drinking at age 13; was drinking a 12 pack over the weekend and two 24 ounces of beer a day during the week along with a pint of hard alcohol    Drug use: No    Sexual activity: Not on file   Lifestyle    Physical activity:     Days per week: Not on file     Minutes per session: Not on file    Stress: Not on file   Relationships    Social connections:     Talks on phone: Not on file     Gets together: Not on file     Attends Jain service: Not on file     Active member of club or organization: Not on file     Attends meetings of clubs or organizations: Not on file     Relationship status: Not on file   Other Topics Concern    Not on file   Social History Narrative    Not on file       Current Outpatient Medications   Medication Sig Dispense Refill    albuterol (VENTOLIN HFA) 90 mcg/actuation inhaler Inhale 2 puffs into the lungs every 6 (six) hours as needed for Wheezing or Shortness of Breath. Rescue 18 g 3    amLODIPine (NORVASC) 10 MG tablet TAKE ONE TABLET BY MOUTH EVERY DAY 30 tablet 11    ascorbic acid, vitamin C, (VITAMIN C) 500 MG tablet Take 1 tablet (500 mg total) by mouth 2 (two) times daily.      b complex vitamins tablet Take 1 tablet by mouth once daily.      calcium acetate (PHOSLO) 667 mg capsule Take 1 capsule (667 mg total) by mouth 3 (three) times daily. 90 capsule 11    diltiaZEM (CARDIZEM CD) 360 MG 24 hr capsule Take 1 capsule (360 mg total) by mouth once daily. 90 capsule 1    enoxaparin (LOVENOX) 100 mg/mL Syrg Inject 0.9 mLs (90 mg total) into the skin once daily. (Patient not taking: Reported on 12/20/2019) 5 mL 0    epoetin vikki (PROCRIT) 10,000 unit/mL injection Inject 1 mL (10,000 Units total) into the skin every  Mon, Wed, Fri. To be given with HD      fish oil-omega-3 fatty acids 300-1,000 mg capsule Take 2 g by mouth once daily.      hydrALAZINE (APRESOLINE) 100 MG tablet TAKE ONE TABLET BY MOUTH THREE TIMES DAILY INCLUDING DIALYSIS DAYS 90 tablet 5    hydrOXYzine pamoate (VISTARIL) 25 MG Cap TAKE ONE CAPSULE BY MOUTH EVERY DAY AS NEEDED FOR ITCHING 30 capsule 2    labetalol (NORMODYNE) 100 MG tablet TAKE ONE TABLET BY MOUTH THREE TIMES DAILY AS NEEDED FOR HEART RATE > 120 90 tablet 3    lisinopril (PRINIVIL,ZESTRIL) 40 MG tablet TAKE ONE TABLET BY MOUTH EVERY DAY 30 tablet 9    multivitamin with minerals tablet Take 1 tablet by mouth once daily.      omega-3 fatty acids-vitamin E (FISH OIL) 1,000 mg Cap Take 1 capsule by mouth once daily.  0    pantoprazole (PROTONIX) 40 MG tablet TAKE ONE TABLET BY MOUTH EVERY DAY 30 tablet 9    ETHAN-RACQUEL RX 1- mg-mg-mcg Tab TAKE ONE TABLET BY MOUTH EVERY DAY 90 tablet 1    tenofovir (VIREAD) 300 mg Tab Take 1 tablet (300 mg total) by mouth once a week. AFTER DIALYSIS 12 tablet 3    vitamin D (VITAMIN D3) 1000 units Tab Take 1,000 Units by mouth 3 (three) times a week.      warfarin (COUMADIN) 7.5 MG tablet Take 2 tablets by mouth on Thursdays and 1 tablet on all other days of the week. (Patient taking differently: Take 2 tablets by mouth on Thursdays and Saturdays; and 1 tablet on all other days of the week.) 35 tablet 2     No current facility-administered medications for this visit.      Facility-Administered Medications Ordered in Other Visits   Medication Dose Route Frequency Provider Last Rate Last Dose    0.9%  NaCl infusion   Intravenous Continuous Christopher Jane MD        sodium chloride 0.9% flush 10 mL  10 mL Intravenous PRN Christopher Jane MD           Review of patient's allergies indicates:  No Known Allergies      There were no vitals filed for this visit.          Physical Exam   Nursing Notes and Vital Signs Reviewed.     Constitutional: Well  developed, well nourished. AAOx3, NAD, speech/ comprehension clear   Head: Atraumatic. Normocephalic.   Eyes: PERRL. EOMI. Conjunctivae are not pale. No scleral icterus.   ENT: Mucous membranes are dry. No tongue tremors. Throat clear.  Neck: Supple. No JVD or LN or Carotid Bruits noted B.  Cardiovascular: S1S2 RRR, no murmurs, rubs, or gallops. Distal pulses are 2+ and symmetric.   Pulmonary/Chest: No evidence of respiratory distress. Clear to auscultation bilaterally. No wheezing, rales or rhonchi. No chest wall TTP.   Abdominal: Soft and non-distended. There is no tenderness. No rebound, guarding, or rigidity. No organomegaly. No mass or viscera palpable  Musculoskeletal: FROM in all extremities. No deformities, no TTP, no edema. No midline spinal TTP. No step-offs. Pelvis is stable to compression. No cyanosis. Moves all four extremities.   Skin: Skin is warm and dry.   Neurological: No gross neurological deficits, Strength 5/5 B, is equal in the upper and lower extremities bilaterally. No sensory deficits to light touch. No pronator drift.  DTRs are 2+ and equal throughout.   Psychiatric: Good eye contact. Normal Affect.      Laboratory Data:  Reviewed and noted in plan where applicable- Please see chart for full laboratory data.       Lab Results   Component Value Date    WBC 4.93 12/05/2019    HGB 9.4 (L) 12/05/2019    HCT 29.0 (L) 12/05/2019     (H) 12/05/2019     (L) 12/05/2019     BMP  Lab Results   Component Value Date     12/05/2019    K 4.2 12/05/2019    CL 99 12/05/2019    CO2 30 (H) 12/05/2019    BUN 40 (H) 12/05/2019    CREATININE 12.2 (H) 12/05/2019    CALCIUM 9.2 12/05/2019    ANIONGAP 13 12/05/2019    ESTGFRAFRICA 5 (A) 12/05/2019    EGFRNONAA 4 (A) 12/05/2019     CMP  Sodium   Date Value Ref Range Status   12/05/2019 142 136 - 145 mmol/L Final     Potassium   Date Value Ref Range Status   12/05/2019 4.2 3.5 - 5.1 mmol/L Final     Chloride   Date Value Ref Range Status    12/05/2019 99 95 - 110 mmol/L Final     CO2   Date Value Ref Range Status   12/05/2019 30 (H) 23 - 29 mmol/L Final     Glucose   Date Value Ref Range Status   12/05/2019 113 (H) 70 - 110 mg/dL Final     BUN, Bld   Date Value Ref Range Status   12/05/2019 40 (H) 6 - 20 mg/dL Final     Creatinine   Date Value Ref Range Status   12/05/2019 12.2 (H) 0.5 - 1.4 mg/dL Final     Calcium   Date Value Ref Range Status   12/05/2019 9.2 8.7 - 10.5 mg/dL Final     Total Protein   Date Value Ref Range Status   12/05/2019 7.9 6.0 - 8.4 g/dL Final     Albumin   Date Value Ref Range Status   12/05/2019 3.5 3.5 - 5.2 g/dL Final     Total Bilirubin   Date Value Ref Range Status   12/05/2019 0.6 0.1 - 1.0 mg/dL Final     Comment:     For infants and newborns, interpretation of results should be based  on gestational age, weight and in agreement with clinical  observations.  Premature Infant recommended reference ranges:  Up to 24 hours.............<8.0 mg/dL  Up to 48 hours............<12.0 mg/dL  3-5 days..................<15.0 mg/dL  6-29 days.................<15.0 mg/dL       Alkaline Phosphatase   Date Value Ref Range Status   12/05/2019 74 55 - 135 U/L Final     AST   Date Value Ref Range Status   12/05/2019 14 10 - 40 U/L Final     ALT   Date Value Ref Range Status   12/05/2019 20 10 - 44 U/L Final     Anion Gap   Date Value Ref Range Status   12/05/2019 13 8 - 16 mmol/L Final     eGFR if    Date Value Ref Range Status   12/05/2019 5 (A) >60 mL/min/1.73 m^2 Final     eGFR if non    Date Value Ref Range Status   12/05/2019 4 (A) >60 mL/min/1.73 m^2 Final     Comment:     Calculation used to obtain the estimated glomerular filtration  rate (eGFR) is the CKD-EPI equation.        Lab Results   Component Value Date    CALCIUM 9.2 12/05/2019    PHOS 4.4 05/01/2019     Lab Results   Component Value Date    K 4.2 12/05/2019     Lab Results   Component Value Date    LABPROT 14.5 (H) 04/09/2020    ALBUMIN  3.5 12/05/2019     Lab Results   Component Value Date    HGBA1C 4.8 09/20/2019       BMP  @[id number](GLU,NA,K,Cl,CO2,BUN,Creatinine,Calcium,MG)@      Radiology:  Reviewed and noted in plan where applicable- Please see chart for full radiology data.            ASSESSMENT/PLAN:     Patient Active Problem List   Diagnosis    Essential hypertension    Hypertensive cardiomegaly with heart failure    Pulmonary HTN    Nonrheumatic aortic valve stenosis    Adult congenital heart disease    Hyperglycemia    S/P AVR (aortic valve replacement)    Postprocedural hypotension    ESRD (end stage renal disease) on dialysis    Severe protein-calorie malnutrition    Anemia of chronic disease    Chronic diastolic heart failure    Atrial tachycardia    History of radiofrequency ablation for complex right atrial arrhythmia    Drug-induced erectile dysfunction    Chronic pulmonary heart disease    Secondary hyperparathyroidism of renal origin    Chronic viral hepatitis B without delta agent and without coma    Atrial fibrillation    Anticoagulated    Encounter for colorectal cancer screening    Colon cancer screening    Special screening for malignant neoplasms, colon    Abnormal diffusion capacity determined by pulmonary function test    Mixed restrictive and obstructive lung disease    DANIEL (obstructive sleep apnea)    Nocturnal hypoxemia due to obstructive chronic bronchitis    Pulmonary nodule, left    Psychophysiological insomnia    Nightmares    Primary snoring    Periodic limb movement disorder (PLMD)    Inadequate sleep hygiene         PLAN:      Assessment and plan:    1.  ESRD:  Doing very well on  dialysis. We will continue hemodialysis treatments three times a week, maintaining a URR of 70% or greater and a Kt/V of 1.20.  Currently, the patient is stable.    2.  Anemia:  We will check hemoglobin at least monthly, target range 10 to 11, transferrin saturation monthly, and ferritin quarterly.   We will dose Epogen and iron according to monthly blood work and according to protocol.    3 . Hypertension.  Currently controlled with current medications, sodium and fluid restrictions and dialysis prescription.    4.  Hyperparathyroidism secondary to renal origin.  We will check intact PTH on a quarterly basis.  We will dose vitamin D according to blood work and protocol.      Tara Yoon, DNP

## 2020-04-14 ENCOUNTER — LAB VISIT (OUTPATIENT)
Dept: LAB | Facility: HOSPITAL | Age: 50
End: 2020-04-14
Attending: INTERNAL MEDICINE
Payer: MEDICARE

## 2020-04-14 DIAGNOSIS — Z79.01 LONG TERM (CURRENT) USE OF ANTICOAGULANTS: ICD-10-CM

## 2020-04-14 LAB
INR PPP: 4.2 (ref 0.8–1.2)
PROTHROMBIN TIME: 42.4 SEC (ref 9–12.5)

## 2020-04-14 PROCEDURE — 85610 PROTHROMBIN TIME: CPT

## 2020-04-14 PROCEDURE — 36415 COLL VENOUS BLD VENIPUNCTURE: CPT

## 2020-04-15 ENCOUNTER — ANTI-COAG VISIT (OUTPATIENT)
Dept: CARDIOLOGY | Facility: CLINIC | Age: 50
End: 2020-04-15
Payer: MEDICARE

## 2020-04-15 DIAGNOSIS — Z95.2 S/P AVR (AORTIC VALVE REPLACEMENT): ICD-10-CM

## 2020-04-15 DIAGNOSIS — Z79.01 LONG TERM (CURRENT) USE OF ANTICOAGULANTS: ICD-10-CM

## 2020-04-15 PROCEDURE — 93793 ANTICOAG MGMT PT WARFARIN: CPT | Mod: ,,,

## 2020-04-15 PROCEDURE — 93793 PR ANTICOAGULANT MGMT FOR PT TAKING WARFARIN: ICD-10-PCS | Mod: ,,,

## 2020-04-15 NOTE — PROGRESS NOTES
Patient contacted:  INR is supratherapeutic at 4.2 after a boosted dose from last subtherapeutic INR of 1.4.  Previous instructions were followed. No signs of any bleeding episodes noted.  Instructions were given to hold warfarin dose today - only, then resume maintenance dose of warfarin 15 mg every Thursday and Saturday; and 7.5 mg on all other days per week.  Recheck on 4/28/2020 ([facility]).  Advised patient to seek medical attention for any signs of abnormal bleeding, if needed.  Patient repeated back instructions and voiced understanding.

## 2020-04-27 RX ORDER — HYDROXYZINE PAMOATE 25 MG/1
25 CAPSULE ORAL DAILY
Qty: 30 CAPSULE | Refills: 5 | Status: SHIPPED | OUTPATIENT
Start: 2020-04-27 | End: 2020-12-11 | Stop reason: SDUPTHER

## 2020-04-28 ENCOUNTER — ANTI-COAG VISIT (OUTPATIENT)
Dept: CARDIOLOGY | Facility: CLINIC | Age: 50
End: 2020-04-28
Payer: MEDICARE

## 2020-04-28 ENCOUNTER — LAB VISIT (OUTPATIENT)
Dept: LAB | Facility: HOSPITAL | Age: 50
End: 2020-04-28
Attending: INTERNAL MEDICINE
Payer: MEDICARE

## 2020-04-28 DIAGNOSIS — Z79.01 ANTICOAGULATED: ICD-10-CM

## 2020-04-28 DIAGNOSIS — Z79.01 LONG TERM (CURRENT) USE OF ANTICOAGULANTS: ICD-10-CM

## 2020-04-28 DIAGNOSIS — Z95.2 S/P AVR (AORTIC VALVE REPLACEMENT): ICD-10-CM

## 2020-04-28 LAB
INR PPP: 1.2 (ref 0.8–1.2)
PROTHROMBIN TIME: 13.3 SEC (ref 9–12.5)

## 2020-04-28 PROCEDURE — 93793 ANTICOAG MGMT PT WARFARIN: CPT | Mod: ,,,

## 2020-04-28 PROCEDURE — 85610 PROTHROMBIN TIME: CPT

## 2020-04-28 PROCEDURE — 93793 PR ANTICOAGULANT MGMT FOR PT TAKING WARFARIN: ICD-10-PCS | Mod: ,,,

## 2020-04-28 PROCEDURE — 36415 COLL VENOUS BLD VENIPUNCTURE: CPT

## 2020-04-28 NOTE — PROGRESS NOTES
Patient contacted:  INR is subtherapeutic at 1.2.  Previous instructions were followed.  Reports x 2 doses missed.  Risk factors explained to patient, when a dose is missed.  Warfarin 7.5 mg taken on Saturday, 5/25/2020, instead of 15 mg.  Denies any signs or symptoms.  Patient refuses a higher dose, than 15 mg.  Refuses Lovenox injections.  Instructions given:  Take warfarin 15 mg today and tomorrow (4/28/2020), until further instructions are given on Thursday, 4/30/2020.  Follow up has been scheduled for 4/30/2020 ([facility]).  Patient repeated back instructions and voiced understanding.

## 2020-04-30 ENCOUNTER — TELEPHONE (OUTPATIENT)
Dept: CARDIOLOGY | Facility: CLINIC | Age: 50
End: 2020-04-30

## 2020-05-05 ENCOUNTER — LAB VISIT (OUTPATIENT)
Dept: LAB | Facility: HOSPITAL | Age: 50
End: 2020-05-05
Attending: INTERNAL MEDICINE
Payer: MEDICARE

## 2020-05-05 ENCOUNTER — ANTI-COAG VISIT (OUTPATIENT)
Dept: CARDIOLOGY | Facility: CLINIC | Age: 50
End: 2020-05-05
Payer: MEDICARE

## 2020-05-05 DIAGNOSIS — Z79.01 LONG TERM (CURRENT) USE OF ANTICOAGULANTS: ICD-10-CM

## 2020-05-05 DIAGNOSIS — Z95.2 S/P AVR (AORTIC VALVE REPLACEMENT): ICD-10-CM

## 2020-05-05 LAB
INR PPP: 3.5 (ref 0.8–1.2)
PROTHROMBIN TIME: 35.5 SEC (ref 9–12.5)

## 2020-05-05 PROCEDURE — 85610 PROTHROMBIN TIME: CPT

## 2020-05-05 PROCEDURE — 36415 COLL VENOUS BLD VENIPUNCTURE: CPT

## 2020-05-05 PROCEDURE — 93793 ANTICOAG MGMT PT WARFARIN: CPT | Mod: ,,,

## 2020-05-05 PROCEDURE — 93793 PR ANTICOAGULANT MGMT FOR PT TAKING WARFARIN: ICD-10-PCS | Mod: ,,,

## 2020-05-05 NOTE — PROGRESS NOTES
Patient contacted:  INR is supratherapeutic at 3.5.  Previous instructions were reported followed.  No other changes.  No signs of any bleeding issues.  Instructions given:  Will lower today's (Tuesday) warfarin dose to 3.75 mg - only, then resume current dose of warfarin 15 mg every Thursday and Saturday; and 7.5 mg on all other days per week.  Will rechallenge.  Recheck in the [facility] on 5/19/2020 at 0930a,  Advised to seek medical attention (ED) for any signs of abnormal bleeding, if needed.  Patient repeated back instructions and voiced understanding.

## 2020-05-19 ENCOUNTER — ANTI-COAG VISIT (OUTPATIENT)
Dept: CARDIOLOGY | Facility: CLINIC | Age: 50
End: 2020-05-19
Payer: MEDICARE

## 2020-05-19 DIAGNOSIS — Z95.2 S/P AVR (AORTIC VALVE REPLACEMENT): ICD-10-CM

## 2020-05-19 DIAGNOSIS — Z79.01 LONG TERM (CURRENT) USE OF ANTICOAGULANTS: Primary | ICD-10-CM

## 2020-05-19 LAB — INR PPP: 2.4 (ref 2–3)

## 2020-05-19 PROCEDURE — 93793 PR ANTICOAGULANT MGMT FOR PT TAKING WARFARIN: ICD-10-PCS | Mod: ,,,

## 2020-05-19 PROCEDURE — 93793 ANTICOAG MGMT PT WARFARIN: CPT | Mod: ,,,

## 2020-05-19 PROCEDURE — 85610 PROTHROMBIN TIME: CPT | Mod: PBBFAC

## 2020-05-19 NOTE — PROGRESS NOTES
Patient's INR is therapeutic at 2.4.  Previous instructions reported followed.  No other changes noted.  Instructions given:  Maintain 15 mg every Thursday and Saturday; and 7.5 mg on all other days per week.  Recheck in 2 weeks.  Dose calendar given and reviewed with patient.  Patient verbalized understanding.

## 2020-05-27 DIAGNOSIS — I49.3 PVC'S (PREMATURE VENTRICULAR CONTRACTIONS): ICD-10-CM

## 2020-05-27 DIAGNOSIS — I10 ESSENTIAL HYPERTENSION: ICD-10-CM

## 2020-05-27 RX ORDER — DILTIAZEM HYDROCHLORIDE 360 MG/1
360 CAPSULE, EXTENDED RELEASE ORAL DAILY
Qty: 90 CAPSULE | Refills: 1 | Status: SHIPPED | OUTPATIENT
Start: 2020-05-27 | End: 2020-11-03

## 2020-05-27 RX ORDER — DILTIAZEM HYDROCHLORIDE 240 MG/1
CAPSULE, COATED, EXTENDED RELEASE ORAL
Qty: 30 CAPSULE | Refills: 5 | OUTPATIENT
Start: 2020-05-27

## 2020-06-02 ENCOUNTER — PATIENT OUTREACH (OUTPATIENT)
Dept: ADMINISTRATIVE | Facility: OTHER | Age: 50
End: 2020-06-02

## 2020-06-02 ENCOUNTER — ANTI-COAG VISIT (OUTPATIENT)
Dept: CARDIOLOGY | Facility: CLINIC | Age: 50
End: 2020-06-02
Payer: MEDICARE

## 2020-06-02 DIAGNOSIS — Z79.01 LONG TERM (CURRENT) USE OF ANTICOAGULANTS: ICD-10-CM

## 2020-06-02 DIAGNOSIS — Z95.2 S/P AVR (AORTIC VALVE REPLACEMENT): ICD-10-CM

## 2020-06-02 DIAGNOSIS — Z79.01 LONG TERM (CURRENT) USE OF ANTICOAGULANTS: Primary | ICD-10-CM

## 2020-06-02 LAB — INR PPP: 1.5 (ref 2–3)

## 2020-06-02 PROCEDURE — 93793 PR ANTICOAGULANT MGMT FOR PT TAKING WARFARIN: ICD-10-PCS | Mod: ,,,

## 2020-06-02 PROCEDURE — 85610 PROTHROMBIN TIME: CPT | Mod: PBBFAC

## 2020-06-02 PROCEDURE — 93793 ANTICOAG MGMT PT WARFARIN: CPT | Mod: ,,,

## 2020-06-02 RX ORDER — WARFARIN 7.5 MG/1
TABLET ORAL
Qty: 35 TABLET | Refills: 2 | Status: SHIPPED | OUTPATIENT
Start: 2020-06-02 | End: 2020-09-08 | Stop reason: SDUPTHER

## 2020-06-02 NOTE — PROGRESS NOTES
Patient's INR is subtherapeutic at 1.5.  Reports an intake of broccoli.  Vitamin K education - given.  Instructions given:  Will boost today's (Tuesday) warfarin dose to 15 mg - only, then rechallenge current dose of 15 mg every Thursday and Saturday; and 7.5 mg on all other days per week.  Advised to keep diet consistent.  Recheck on 6/15/2020 with other labs.  Dose calendar given and reviewed with patient.  Patient repeated back instructions and verbalized understanding.

## 2020-06-03 ENCOUNTER — OFFICE VISIT (OUTPATIENT)
Dept: UROLOGY | Facility: CLINIC | Age: 50
End: 2020-06-03
Payer: MEDICARE

## 2020-06-03 VITALS
BODY MASS INDEX: 30.21 KG/M2 | SYSTOLIC BLOOD PRESSURE: 128 MMHG | WEIGHT: 229 LBS | TEMPERATURE: 99 F | DIASTOLIC BLOOD PRESSURE: 82 MMHG

## 2020-06-03 DIAGNOSIS — R31.9 HEMATURIA, UNSPECIFIED TYPE: Primary | ICD-10-CM

## 2020-06-03 PROCEDURE — 99213 OFFICE O/P EST LOW 20 MIN: CPT | Mod: PBBFAC | Performed by: UROLOGY

## 2020-06-03 PROCEDURE — 99999 PR PBB SHADOW E&M-EST. PATIENT-LVL III: CPT | Mod: PBBFAC,,, | Performed by: UROLOGY

## 2020-06-03 PROCEDURE — 99999 PR PBB SHADOW E&M-EST. PATIENT-LVL III: ICD-10-PCS | Mod: PBBFAC,,, | Performed by: UROLOGY

## 2020-06-03 PROCEDURE — 99214 PR OFFICE/OUTPT VISIT, EST, LEVL IV, 30-39 MIN: ICD-10-PCS | Mod: S$PBB,,, | Performed by: UROLOGY

## 2020-06-03 PROCEDURE — 99214 OFFICE O/P EST MOD 30 MIN: CPT | Mod: S$PBB,,, | Performed by: UROLOGY

## 2020-06-03 NOTE — PROGRESS NOTES
Chief Complaint: Hematuria    HPI:   6/3/20: Had some gross hematuria again a few weeks ago.  Voids once every AM and that is it.  On his way to dialysis.  Wants to be listed on the transplant list.  4/8/19: No hematuria since last visit.  Pt advised that cystoscopy is recommended and he declines.  Reviewed history in detail.  Discussion of risks/benefits was had.  2/28/19: 48 yo man voids about once a day since he is a MWF dialysis pt.  Saw a clot and gross hematuria about 2-3 weeks ago on one occasion.  No abd/pelvic pain and no exac/rel factors.  No urolithiasis.  Weak stream.  No  history.  Normal sexual function until recently.  Had a CT Urogram that shows no abnormalities except renal cystic function consistent with ESRD.    Allergies:  Patient has no known allergies.    Medications:  has a current medication list which includes the following prescription(s): albuterol, amlodipine, ascorbic acid (vitamin c), b complex vitamins, calcium acetate(phosphat bind), diltiazem, enoxaparin, epoetin vikki, fish oil-omega-3 fatty acids, hydralazine, hydroxyzine pamoate, labetalol, lisinopril, multivitamin with minerals, omega-3 fatty acids-vitamin e, pantoprazole, shawna-nataly rx, tenofovir, vitamin d, and warfarin, and the following Facility-Administered Medications: sodium chloride 0.9% and sodium chloride 0.9%.    Review of Systems:  General: No fever, chills, fatigability, or weight loss.  Skin: No rashes, itching, or changes in color or texture of skin.  Chest: Denies HAY, cyanosis, wheezing, cough, and sputum production.  Abdomen: Appetite fine. No weight loss. Denies diarrhea, abdominal pain, hematemesis, or blood in stool.  Musculoskeletal: No joint stiffness or swelling. Denies back pain.  : As above.  All other review of systems negative.    PMH:   has a past medical history of Aortic valve stenosis, Atrial fibrillation, Atrial flutter, Cardiomyopathy, CHF (congestive heart failure), Drug-induced erectile  dysfunction, ESRD due to hypertension, ESRD on dialysis, Hepatitis B, Hyperlipidemia, Hypertension, DANIEL (obstructive sleep apnea) (11/12/2019), Secondary hyperparathyroidism of renal origin, Supraventricular tachycardia, Tachycardia, and Valvular regurgitation.    PSH:   has a past surgical history that includes ASD repair; Cardiac valuve replacement (02/08/2017); Radiofrequency ablation (03/13/2017); Cardiac catheterization; AV Graft Creation (Left, 03/2017); Colonoscopy (N/A, 8/27/2019); and Esophagogastroduodenoscopy (N/A, 8/27/2019).    FamHx: family history includes Anesthesia problems in his paternal uncle; Diabetes in his paternal aunt and paternal aunt; Heart attack in his father; Heart failure in his paternal grandmother; Hypertension in his paternal aunt; Leukemia in his mother and paternal aunt; No Known Problems in his brother, sister, sister, and sister; Stroke in his paternal aunt; Suicide in his paternal uncle; Valvular heart disease in his maternal aunt.    SocHx:  reports that he has never smoked. He has never used smokeless tobacco. He reports that he does not drink alcohol or use drugs.      Physical Exam:  Vitals:    06/03/20 0819   BP: 128/82   Temp: 98.6 °F (37 °C)     General: A&Ox3, no apparent distress, no deformities  Neck: No masses, normal thyroid  Lungs: normal inspiration, no use of accessory muscles  Heart: normal pulse, no arrhythmias  Abdomen: Soft, NT, ND  Skin: The skin is warm and dry. No jaundice.  Ext: No c/c/e.  :   2/19: Test desc felicita, no abnormalities of epididymus. Penis normal, with normal penile and scrotal skin. Meatus normal.     Labs/Studies:     Impression/Plan:   1. Hematuria workup incomplete pt declined further workup last year. Discussed and agreed to cysto.  Will arrange.  UA/UCx at lab pt can't void now.  US again.

## 2020-06-08 ENCOUNTER — TELEPHONE (OUTPATIENT)
Dept: RADIOLOGY | Facility: HOSPITAL | Age: 50
End: 2020-06-08

## 2020-06-09 ENCOUNTER — TELEPHONE (OUTPATIENT)
Dept: HEPATOLOGY | Facility: CLINIC | Age: 50
End: 2020-06-09

## 2020-06-09 NOTE — TELEPHONE ENCOUNTER
Called patient and he stated that he can't come to [place] anymore for care. I called the [facility] and got him scheduled with Sandy Lane NP.

## 2020-06-09 NOTE — TELEPHONE ENCOUNTER
----- Message from Katie De La Cruz sent at 6/9/2020  8:15 AM CDT -----  Contact: Pt  Name of Caller: Isidro White  Reason for Visit/Symptoms: f/u visit   Best Contact Number or Confirm if Mychart Preferred: Callback  Preferred Date/Time of Appointment: First available on a Tuesday or Thursday  Interested in Virtual Visit (yes/no): Yes  Additional Information:  Pt asked if he can get an audio visit instead of traveling all the way to [place] just to talk says he is only available on Tuesday or Thursday

## 2020-06-15 ENCOUNTER — TELEPHONE (OUTPATIENT)
Dept: RADIOLOGY | Facility: HOSPITAL | Age: 50
End: 2020-06-15

## 2020-06-16 ENCOUNTER — ANTI-COAG VISIT (OUTPATIENT)
Dept: CARDIOLOGY | Facility: CLINIC | Age: 50
End: 2020-06-16
Payer: MEDICARE

## 2020-06-16 ENCOUNTER — HOSPITAL ENCOUNTER (OUTPATIENT)
Dept: RADIOLOGY | Facility: HOSPITAL | Age: 50
Discharge: HOME | End: 2020-06-16
Attending: NURSE PRACTITIONER
Payer: MEDICARE

## 2020-06-16 DIAGNOSIS — Z79.01 LONG TERM (CURRENT) USE OF ANTICOAGULANTS: ICD-10-CM

## 2020-06-16 DIAGNOSIS — B18.1 CHRONIC VIRAL HEPATITIS B WITHOUT DELTA AGENT AND WITHOUT COMA: ICD-10-CM

## 2020-06-16 DIAGNOSIS — I48.91 ATRIAL FIBRILLATION, UNSPECIFIED TYPE: ICD-10-CM

## 2020-06-16 PROCEDURE — 93793 PR ANTICOAGULANT MGMT FOR PT TAKING WARFARIN: ICD-10-PCS | Mod: ,,,

## 2020-06-16 PROCEDURE — 76700 US EXAM ABDOM COMPLETE: CPT | Mod: TC

## 2020-06-16 PROCEDURE — 76700 US ABDOMEN COMPLETE: ICD-10-PCS | Mod: 26,,, | Performed by: RADIOLOGY

## 2020-06-16 PROCEDURE — 76700 US EXAM ABDOM COMPLETE: CPT | Mod: 26,,, | Performed by: RADIOLOGY

## 2020-06-16 PROCEDURE — 93793 ANTICOAG MGMT PT WARFARIN: CPT | Mod: ,,,

## 2020-06-16 NOTE — PROGRESS NOTES
Patient contacted: Patient's INR is therapeutic at 2.5. Patient followed previous instructions Patient reports no changes. Instructed to continue warfarin 15 mg on Thursdays and Saturdays; and 7.5 mg all other days. Recheck on 7/9/20 at [facility] (pt's request). Patient verbalizes understanding. States he has been following the [facility] Dosing calendar given at visit on 6/2/20.

## 2020-06-18 ENCOUNTER — TELEPHONE (OUTPATIENT)
Dept: TRANSPLANT | Facility: CLINIC | Age: 50
End: 2020-06-18

## 2020-06-18 NOTE — TELEPHONE ENCOUNTER
Returned patient's call. He states he is returning call from someone at [facility] but a message was not left on his voicemail. Reviewed encounter notes and unable to identify who may have called patient.  Patient expressed his appreciation for searching.   ----- Message from Gavin Carroll sent at 6/18/2020  3:00 PM CDT -----  Contact: pt  Calling to speak with coordinator     Call back: [phone]

## 2020-06-22 ENCOUNTER — PATIENT OUTREACH (OUTPATIENT)
Dept: ADMINISTRATIVE | Facility: OTHER | Age: 50
End: 2020-06-22

## 2020-06-22 ENCOUNTER — TELEPHONE (OUTPATIENT)
Dept: VASCULAR SURGERY | Facility: CLINIC | Age: 50
End: 2020-06-22

## 2020-06-22 NOTE — TELEPHONE ENCOUNTER
Spoke with Aishwarya and informed her the provider or pt needs to request was needed for the pt.She verbalized understanding of information received  ----- Message from Glen Holguin sent at 6/22/2020 12:47 PM CDT -----  Regarding: surgery notes from access        The caller (Aishwarya with [facility] would like for the Pt's surgery notes(from when Dr. Manning put on his access in 2017) to be faxed over to another provider that the Pt will be seeing.     [facility]  [phone] ( ATTN. [place])    If any questions for Aishwarya with [facility]- Phone # [phone]

## 2020-06-22 NOTE — PROGRESS NOTES
Patient's chart was reviewed.   Requested updates within Care Everywhere.  Immunizations reconciled.    Health Maintenance was updated.

## 2020-06-23 ENCOUNTER — TELEPHONE (OUTPATIENT)
Dept: HEPATOLOGY | Facility: CLINIC | Age: 50
End: 2020-06-23

## 2020-06-23 ENCOUNTER — OFFICE VISIT (OUTPATIENT)
Dept: CARDIOLOGY | Facility: CLINIC | Age: 50
End: 2020-06-23
Payer: MEDICARE

## 2020-06-23 VITALS
WEIGHT: 227.31 LBS | HEART RATE: 85 BPM | OXYGEN SATURATION: 95 % | SYSTOLIC BLOOD PRESSURE: 112 MMHG | BODY MASS INDEX: 30.13 KG/M2 | HEIGHT: 73 IN | DIASTOLIC BLOOD PRESSURE: 72 MMHG

## 2020-06-23 DIAGNOSIS — Z99.2 ESRD (END STAGE RENAL DISEASE) ON DIALYSIS: Chronic | ICD-10-CM

## 2020-06-23 DIAGNOSIS — I47.19 ATRIAL TACHYCARDIA: ICD-10-CM

## 2020-06-23 DIAGNOSIS — I10 ESSENTIAL HYPERTENSION: ICD-10-CM

## 2020-06-23 DIAGNOSIS — Z79.01 ANTICOAGULATED: ICD-10-CM

## 2020-06-23 DIAGNOSIS — Q24.9 ADULT CONGENITAL HEART DISEASE: ICD-10-CM

## 2020-06-23 DIAGNOSIS — I27.20 PULMONARY HTN: ICD-10-CM

## 2020-06-23 DIAGNOSIS — I35.0 NONRHEUMATIC AORTIC VALVE STENOSIS: ICD-10-CM

## 2020-06-23 DIAGNOSIS — B18.1 CHRONIC VIRAL HEPATITIS B WITHOUT DELTA AGENT AND WITHOUT COMA: ICD-10-CM

## 2020-06-23 DIAGNOSIS — I48.91 ATRIAL FIBRILLATION, UNSPECIFIED TYPE: ICD-10-CM

## 2020-06-23 DIAGNOSIS — Z98.890 HISTORY OF RADIOFREQUENCY ABLATION FOR COMPLEX RIGHT ATRIAL ARRHYTHMIA: ICD-10-CM

## 2020-06-23 DIAGNOSIS — G47.33 OSA (OBSTRUCTIVE SLEEP APNEA): ICD-10-CM

## 2020-06-23 DIAGNOSIS — I11.0 HYPERTENSIVE CARDIOMEGALY WITH HEART FAILURE: Primary | ICD-10-CM

## 2020-06-23 DIAGNOSIS — B18.1 CHRONIC VIRAL HEPATITIS B WITHOUT DELTA AGENT AND WITHOUT COMA: Primary | ICD-10-CM

## 2020-06-23 DIAGNOSIS — Z95.2 S/P AVR (AORTIC VALVE REPLACEMENT): ICD-10-CM

## 2020-06-23 DIAGNOSIS — N18.6 ESRD (END STAGE RENAL DISEASE) ON DIALYSIS: Chronic | ICD-10-CM

## 2020-06-23 DIAGNOSIS — I50.32 CHRONIC DIASTOLIC HEART FAILURE: ICD-10-CM

## 2020-06-23 PROCEDURE — 99214 OFFICE O/P EST MOD 30 MIN: CPT | Mod: PBBFAC | Performed by: INTERNAL MEDICINE

## 2020-06-23 PROCEDURE — 99214 PR OFFICE/OUTPT VISIT, EST, LEVL IV, 30-39 MIN: ICD-10-PCS | Mod: S$PBB,,, | Performed by: INTERNAL MEDICINE

## 2020-06-23 PROCEDURE — 99999 PR PBB SHADOW E&M-EST. PATIENT-LVL IV: CPT | Mod: PBBFAC,,, | Performed by: INTERNAL MEDICINE

## 2020-06-23 PROCEDURE — 99214 OFFICE O/P EST MOD 30 MIN: CPT | Mod: S$PBB,,, | Performed by: INTERNAL MEDICINE

## 2020-06-23 PROCEDURE — 99999 PR PBB SHADOW E&M-EST. PATIENT-LVL IV: ICD-10-PCS | Mod: PBBFAC,,, | Performed by: INTERNAL MEDICINE

## 2020-06-23 RX ORDER — TENOFOVIR DISOPROXIL FUMARATE 300 MG/1
300 TABLET, FILM COATED ORAL WEEKLY
Qty: 12 TABLET | Refills: 0 | Status: SHIPPED | OUTPATIENT
Start: 2020-06-23 | End: 2020-10-13 | Stop reason: SDUPTHER

## 2020-06-23 RX ORDER — TENOFOVIR DISOPROXIL FUMARATE 300 MG/1
300 TABLET, FILM COATED ORAL WEEKLY
Qty: 12 TABLET | Refills: 3 | OUTPATIENT
Start: 2020-06-23

## 2020-06-23 NOTE — TELEPHONE ENCOUNTER
Called patient and gave him lab results. Patient stated that he is almost out of Viread and needs a refill. Will send a message to Lina.

## 2020-06-23 NOTE — PROGRESS NOTES
"Subjective:   Patient ID:  Isidro White Jr. is a 50 y.o. male who presents for cardiac consult of Follow-up      Chest Pain   Associated symptoms include malaise/fatigue. Pertinent negatives include no palpitations or shortness of breath.   His past medical history is significant for CHF.   Congestive Heart Failure  Associated symptoms include chest pain. Pertinent negatives include no palpitations or shortness of breath.   Hypertension  Associated symptoms include chest pain and malaise/fatigue. Pertinent negatives include no palpitations or shortness of breath.     The patient came in today for cardiac consult of Follow-up    Isidro White Jr. is a 50 y.o. male pt with ASD closure at age 8 ([facility]), HFpEF, s/p AVR with a 22 mm mechanical valve and TV annuloplasty with a 28 mm ring 3/17, HTN, PHTN, SVT, EP performed RFA (3/13/17) and has been on HD - MWF since Feb 8,2016 here for CV follow up.     12/13/18  Holter from 9/4/18 revealed freq PVCs, dilt increased to 240 mg. No palpitations. Has been doing well lately, BP is well controlled. Labwork from HD has been normal. Still makes some urine, says he has some hematuria. Will need annual stress and 2D echo. He is also undergoing workup for HepB will see Dr. Youngblood.     3/15/19  Recent ER eval at [facility] - He presented with chest pain and shortness of breath. He is a dialysis patient has a history of anemia. He states "I believe that the lack of oxygen". Patient has received blood transfusions in the past but not recently. Patient had dialysis yesterday and is a Monday Wednesday Friday dialysis patient. His troponin is mildly elevated. His chest x-ray is relatively clear. We do not see a large effusion and there is no obvious infiltrate at this time. Patient states at times he has chest pain and it feels like he is smothering. The safest plan is to admit the patient and repeat his cardiac enzymes and consider given the patient a blood transfusion. " However the patient is a dialysis patient so we are unable to admit him at this facility. Patient nephrologist is an [facility] and [facility]  Pt Left AMA and now here for follow up.   Pt has SOB usually at night, feels suffocated and can hear himself snore and wakes up. No prior sleep study.      6/18/19  Pt will need EGD/Cscope. Will be bridged with heparin while holding coumadin and followed at coumadin clinic. Pt also needs liver biopsy, he will be ruled out for esophageal varicies. He also had recent admission for PNA, tx with abx. Now feels well, breathing normal. No CP/SOB.     9/19/19  Per recent workup - Fibroscan suggests cirrhosis and he has thrombocytopenia though no other findings of portal hypertension on imaging or EGD. OK to proceed with kidney transplant from hepatology standpoint. For renal transplant process- recent stress and echo are normal.     2/27/20  He was prescibed meds for ED - he was given Cialis 5 mg. Overall feels well, no CP/SOB. He was denied transplant recently but will have further workup next month with stress and echo. He will also need sleep study.   ECG - NSR, LAE, LAD, nonspec changes    6/23/20  ECHO and stress neg in 3/2020. ECHO with grade 2 DD, PA pressure 42 mmHg. Recent INR therapeutic. Sleep study since last visit neg.   Occ decreased energy/fatigue with exertion. He walks 6 labs around the park at times but other times can't do as much. Dry weight is 101 kg. Today he is 103 kg.  He will have cystoscopy in July by Dr. Bass.      Patient feels no chest pain, no leg swelling, no PND, no palpitation, no dizziness, no syncope, no CNS symptoms.    Patient has fairly good exercise tolerance.     Patient is compliant with medications.    3/10/20 Nuclear Stress    The perfusion scan is free of evidence from myocardial ischemia or injury.    There is a  mild intensity fixed defect in the  wall of the left ventricle secondary to diaphragm  attenuation.    There is a  intensity defect in the inferior wall of the left ventricle consistent with the RV insertion site.    Gated perfusion images showed an ejection fraction of 50% at rest and 50% post stress.    There is normal wall motion at rest and post stress.    The EKG portion of this study is negative for ischemia.    3/10/20 ECHO  · Mild concentric left ventricular hypertrophy.  · Moderate left atrial enlargement.  · Normal left ventricular systolic function. The estimated ejection fraction is 60%.  · Grade II (moderate) left ventricular diastolic dysfunction consistent with pseudonormalization.  · Normal right ventricular systolic function.  · There is a mechanical aortic valve present. Mean gradient is 38 mmHg.  · Mild mitral regurgitation.  · Mild tricuspid regurgitation.  · Normal central venous pressure (3 mmHg).  · The estimated PA systolic pressure is 42 mmHg.  · Pulmonary hypertension present.  · Mild mitral stenosis.  · Mild right ventricular enlargement.        9/4/18 HOLTER  TEST DESCRIPTION   PREDOMINANT RHYTHM  1. Sinus rhythm with heart rates varying between 69 and 124 bpm with an average of 85 bpm.   VENTRICULAR ARRHYTHMIAS  1. There were frequent PVCs totalling 1738 and averaging 36 per hour.  There were 9 bigeminal cycles.  There were 3 triplets.   2. There was one (6 beat) run of non-sustained ventricular tachycardia. The rate was 125 bpm.     SUPRA VENTRICULAR ARRHYTHMIAS  1. There were occasional PACs totalling 995 and averaging 20 per hour.  There were 9 triplets.   2. There were 4 (12 beat) runs of non-sustained SVT. The rate was 145 bpm.        Cath 2/17  1. Coronary angiography.      a.     Left main widely patent.      b.     LAD widely patent.      c.     Ramus widely patent and massive in size.      d.     Circumflex widely patent with a small OM 1 and OM 2 branch.      e.     Right coronary widely patent.  2. Hemodynamics:  Right heart catheterization.      a.      Pulmonary capillary wedge pressure 33 at end-expiration.      b.     Pulmonary artery pressure 78/42.      c.     Right ventricular pressure 70/21.      d.     Right atrial pressure 22 with a peak V-wave of 24.      e.     Cardiac output by thermodilution is between 6 and 7       L/minute, by Patricia 6.1 L/minute.    CONCLUSION  1. Normal coronary arteries.  2. Pulmonary hypertension.  3. Severe aortic insufficiency.      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     HD M, W, F    ESRD on dialysis     Hepatitis B     Hyperlipidemia     Hypertension     DANIEL (obstructive sleep apnea) 11/12/2019    Secondary hyperparathyroidism of renal origin     Supraventricular tachycardia     atrial tachycardia    Tachycardia     Valvular regurgitation     AI, TR       Past Surgical History:   Procedure Laterality Date    ASD REPAIR      age 8 [facility]    AV Graft Creation Left 03/2017    [facility]TERIZATION      [facility]     CARDIAC VALVE SURGERY  02/08/2017    22 mm Medtronic AV, 28 mm TV Medtronic annuloplaty ring    COLONOSCOPY N/A 8/27/2019    Procedure: COLONOSCOPY;  Surgeon: Addie John MD;  Location: [facility];  Service: Endoscopy;  Laterality: N/A;  dialysis pt *needs K*    ESOPHAGOGASTRODUODENOSCOPY N/A 8/27/2019    Procedure: EGD (ESOPHAGOGASTRODUODENOSCOPY);  Surgeon: Addie John MD;  Location: [facility];  Service: Endoscopy;  Laterality: N/A;    RADIOFREQUENCY ABLATION  03/13/2017    Atrial tachycardia       Social History     Tobacco Use    Smoking status: Never Smoker    Smokeless tobacco: Never Used   Substance Use Topics    Alcohol use: No     Frequency: Never     Comment: quit in 2016; does have heavy drinking history; started drinking at age 13; was drinking a  12 pack over the weekend and two 24 ounces of beer a day during the week along with a pint of hard alcohol    Drug use: No       Family History   Problem Relation Age of Onset    Leukemia Mother     Heart attack Father         massive MI at age 67    No Known Problems Sister     No Known Problems Brother     No Known Problems Sister     No Known Problems Sister     Heart failure Paternal Grandmother     Diabetes Paternal Aunt     Hypertension Paternal Aunt     Leukemia Paternal Aunt         CLL    Suicide Paternal Uncle     Anesthesia problems Paternal Uncle     Diabetes Paternal Aunt     Stroke Paternal Aunt     Valvular heart disease Maternal Aunt     Kidney disease Neg Hx     Colon cancer Neg Hx        Patient's Medications   New Prescriptions    No medications on file   Previous Medications    ALBUTEROL (VENTOLIN HFA) 90 MCG/ACTUATION INHALER    Inhale 2 puffs into the lungs every 6 (six) hours as needed for Wheezing or Shortness of Breath. Rescue    AMLODIPINE (NORVASC) 10 MG TABLET    TAKE ONE TABLET BY MOUTH EVERY DAY    ASCORBIC ACID, VITAMIN C, (VITAMIN C) 500 MG TABLET    Take 1 tablet (500 mg total) by mouth 2 (two) times daily.    B COMPLEX VITAMINS TABLET    Take 1 tablet by mouth once daily.    CALCIUM ACETATE (PHOSLO) 667 MG CAPSULE    Take 1 capsule (667 mg total) by mouth 3 (three) times daily.    DILTIAZEM (CARDIZEM CD) 360 MG 24 HR CAPSULE    Take 1 capsule (360 mg total) by mouth once daily.    ENOXAPARIN (LOVENOX) 100 MG/ML SYRG    Inject 0.9 mLs (90 mg total) into the skin once daily.    EPOETIN TORRIE (PROCRIT) 10,000 UNIT/ML INJECTION    Inject 1 mL (10,000 Units total) into the skin every Mon, Wed, Fri. To be given with HD    FISH OIL-OMEGA-3 FATTY ACIDS 300-1,000 MG CAPSULE    Take 2 g by mouth once daily.    HYDRALAZINE (APRESOLINE) 100 MG TABLET    TAKE ONE TABLET BY MOUTH THREE TIMES DAILY INCLUDING DIALYSIS DAYS    HYDROXYZINE PAMOATE (VISTARIL) 25 MG CAP    T1C PO QD  PRN ITCHING    LABETALOL (NORMODYNE) 100 MG TABLET    TAKE ONE TABLET BY MOUTH THREE TIMES DAILY AS NEEDED FOR HEART RATE > 120    LISINOPRIL (PRINIVIL,ZESTRIL) 40 MG TABLET    TAKE ONE TABLET BY MOUTH EVERY DAY    MULTIVITAMIN WITH MINERALS TABLET    Take 1 tablet by mouth once daily.    OMEGA-3 FATTY ACIDS-VITAMIN E (FISH OIL) 1,000 MG CAP    Take 1 capsule by mouth once daily.    PANTOPRAZOLE (PROTONIX) 40 MG TABLET    TAKE ONE TABLET BY MOUTH EVERY DAY    ETHAN-RACQUEL RX 1- MG-MG-MCG TAB    TAKE ONE TABLET BY MOUTH EVERY DAY    TENOFOVIR (VIREAD) 300 MG TAB    Take 1 tablet (300 mg total) by mouth once a week.    VITAMIN D (VITAMIN D3) 1000 UNITS TAB    Take 1,000 Units by mouth 3 (three) times a week.    WARFARIN (COUMADIN) 7.5 MG TABLET    Take 2 tablets by mouth on Thursdays and 1 tablet on all other days of the week.   Modified Medications    No medications on file   Discontinued Medications    No medications on file       Review of Systems   Constitutional: Positive for malaise/fatigue.   HENT: Negative.    Eyes: Negative.    Respiratory: Negative for shortness of breath.    Cardiovascular: Positive for chest pain. Negative for palpitations.   Gastrointestinal: Negative.    Genitourinary: Positive for hematuria.   Musculoskeletal: Negative.    Skin: Negative.    Neurological: Negative.    Endo/Heme/Allergies: Negative.    Psychiatric/Behavioral: Negative.    All 12 systems otherwise negative.      Wt Readings from Last 3 Encounters:   06/23/20 103.1 kg (227 lb 4.7 oz)   06/03/20 103.9 kg (229 lb)   03/10/20 103.9 kg (229 lb)     Temp Readings from Last 3 Encounters:   06/03/20 98.6 °F (37 °C) (Temporal)   12/20/19 96.8 °F (36 °C) (Tympanic)   10/05/19 98 °F (36.7 °C) (Oral)     BP Readings from Last 3 Encounters:   06/23/20 112/72   06/03/20 128/82   03/10/20 (!) 142/76     Pulse Readings from Last 3 Encounters:   06/23/20 85   03/10/20 81   03/10/20 70       /72 (BP Location: Right arm, Patient  "Position: Sitting, BP Method: Large (Manual))   Pulse 85   Ht 6' 1" (1.854 m)   Wt 103.1 kg (227 lb 4.7 oz)   SpO2 95%   BMI 29.99 kg/m²      Objective:   Physical Exam   Constitutional: He is oriented to person, place, and time. He appears well-developed and well-nourished. No distress.   HENT:   Head: Normocephalic and atraumatic.   Nose: Nose normal.   Mouth/Throat: Oropharynx is clear and moist.   Eyes: Conjunctivae and EOM are normal. No scleral icterus.   Neck: Normal range of motion. Neck supple. No JVD present. No thyromegaly present.   Cardiovascular: Normal rate, regular rhythm, S1 normal and S2 normal. Exam reveals no gallop, no S3, no S4 and no friction rub.   Murmur heard.   Midsystolic murmur is present at the upper right sternal border.  [place]ford S2   Pulmonary/Chest: Effort normal and breath sounds normal. No stridor. No respiratory distress. He has no wheezes. He has no rales. He exhibits no tenderness.   Abdominal: Soft. Bowel sounds are normal. He exhibits no distension and no mass. There is no abdominal tenderness. There is no rebound.   Genitourinary:    Genitourinary Comments: Deferred     Musculoskeletal: Normal range of motion.         General: No tenderness, deformity or edema.   Lymphadenopathy:     He has no cervical adenopathy.   Neurological: He is alert and oriented to person, place, and time. He exhibits normal muscle tone. Coordination normal.   Skin: Skin is warm and dry. No rash noted. He is not diaphoretic. No erythema. No pallor.   Psychiatric: He has a normal mood and affect. His behavior is normal. Judgment and thought content normal.   Nursing note and vitals reviewed.      Lab Results   Component Value Date     06/16/2020    K 4.5 06/16/2020    CL 94 (L) 06/16/2020    CO2 30 (H) 06/16/2020    BUN 63 (H) 06/16/2020    CREATININE 13.7 (H) 06/16/2020    GLU 92 06/16/2020    HGBA1C 4.8 09/20/2019    MG 2.1 04/30/2019    AST 14 06/16/2020    ALT 18 06/16/2020    ALBUMIN 3.3 " (L) 06/16/2020    PROT 8.3 06/16/2020    BILITOT 0.5 06/16/2020    WBC 6.08 06/16/2020    HGB 11.2 (L) 06/16/2020    HCT 34.1 (L) 06/16/2020    HCT 25 (L) 02/08/2017     (H) 06/16/2020     06/16/2020    INR 2.5 (H) 06/16/2020    INR 2.5 (H) 06/16/2020    CHOL 213 (H) 01/08/2019    HDL 50 01/08/2019    LDLCALC 142.4 01/08/2019    TRIG 103 01/08/2019     (H) 04/17/2019     Assessment:      1. Hypertensive cardiomegaly with heart failure    2. Essential hypertension    3. Nonrheumatic aortic valve stenosis    4. S/P AVR (aortic valve replacement)    5. Adult congenital heart disease    6. Chronic diastolic heart failure    7. Atrial tachycardia    8. History of radiofrequency ablation for complex right atrial arrhythmia    9. Atrial fibrillation, unspecified type    10. ESRD (end stage renal disease) on dialysis    11. Pulmonary HTN    12. Anticoagulated    13. DANIEL (obstructive sleep apnea)        Plan:   1. Chronic Diastolic HF  - recovered EF  - cont low salt diet  - pt euvolemic     2. S/p mech AVR  - cont coumadin, check INR  - f/u with coumadin clinic here    3. ESRD  - cont HD  - stable CV status for transplant, recent stress/echo negative    4. HTN  - cont meds    6. PVCs, SVT s/p RFA   - cont BB and CCB    7. HLD  - cont statin    8. Pre-OP CV evaluation prior to renal transplant  Moderate periop risk of CV events for high risk procedure.  Good functional and exercise capacity.  No chest pain, active arrhythmia and CHF symptoms.  Continue BB and Statin periop.  Need to have stress and echo repeat in 3/2021    9. ED  - ok to start treatment - rec Cialis  - monitor BP, avoid nitrates     10. Pulm HTN/ DANIEL  - no significant apnea on recent study  - f/u with pulm  - cont tx    Thank you for allowing me to participate in this patient's care. Please do not hesitate to contact me with any questions or concerns. Consult note has been forwarded to the referral physician.

## 2020-06-23 NOTE — TELEPHONE ENCOUNTER
----- Message from Lina Dominguez NP sent at 6/23/2020 10:25 AM CDT -----  Please notify patient-  Labs are stable, liver is working well.  Blood test for liver cancer screening is normal.  Hepatitis B virus level is very low, continue the tenofovir/viread without missing doses.    Can arrange next liver / hepatitis B labs when he sees Sandy in July.

## 2020-06-25 ENCOUNTER — TELEPHONE (OUTPATIENT)
Dept: NEPHROLOGY | Facility: CLINIC | Age: 50
End: 2020-06-25

## 2020-06-25 NOTE — TELEPHONE ENCOUNTER
----- Message from Jahaira Broussard sent at 6/25/2020 10:41 AM CDT -----  Regarding: criticval lab  Contact: janay Arnold  Ms Arnold needs to speak to nurse  regarding critaical labs on patient, ref#[id number], please call her back at [phone]

## 2020-06-25 NOTE — TELEPHONE ENCOUNTER
Returned call. They will have to contact on call nurse or provider as I am not able to take the critical for patient at [facility].

## 2020-07-01 ENCOUNTER — HOSPITAL ENCOUNTER (INPATIENT)
Facility: HOSPITAL | Age: 50
LOS: 4 days | Discharge: HOME | DRG: 264 | End: 2020-07-06
Attending: EMERGENCY MEDICINE | Admitting: INTERNAL MEDICINE
Payer: MEDICARE

## 2020-07-01 DIAGNOSIS — T82.7XXA ARTERIOVENOUS GRAFT INFECTION: Primary | ICD-10-CM

## 2020-07-01 DIAGNOSIS — R00.0 TACHYCARDIA: ICD-10-CM

## 2020-07-01 DIAGNOSIS — T82.838A BLEEDING FROM DIALYSIS SHUNT, INITIAL ENCOUNTER: ICD-10-CM

## 2020-07-01 DIAGNOSIS — N18.6 END STAGE RENAL DISEASE: ICD-10-CM

## 2020-07-01 DIAGNOSIS — I47.20 V-TACH: ICD-10-CM

## 2020-07-01 LAB
ABO + RH BLD: NORMAL
ALBUMIN SERPL BCP-MCNC: 3.4 G/DL (ref 3.5–5.2)
ALP SERPL-CCNC: 76 U/L (ref 55–135)
ALT SERPL W/O P-5'-P-CCNC: 18 U/L (ref 10–44)
ANION GAP SERPL CALC-SCNC: 20 MMOL/L (ref 8–16)
APTT BLDCRRT: 52.3 SEC (ref 21–32)
AST SERPL-CCNC: 17 U/L (ref 10–40)
BASOPHILS # BLD AUTO: 0.01 K/UL (ref 0–0.2)
BASOPHILS # BLD AUTO: 0.02 K/UL (ref 0–0.2)
BASOPHILS NFR BLD: 0.1 % (ref 0–1.9)
BASOPHILS NFR BLD: 0.2 % (ref 0–1.9)
BILIRUB SERPL-MCNC: 0.5 MG/DL (ref 0.1–1)
BLD GP AB SCN CELLS X3 SERPL QL: NORMAL
BUN SERPL-MCNC: 72 MG/DL (ref 6–20)
CALCIUM SERPL-MCNC: 9.6 MG/DL (ref 8.7–10.5)
CHLORIDE SERPL-SCNC: 95 MMOL/L (ref 95–110)
CO2 SERPL-SCNC: 24 MMOL/L (ref 23–29)
CREAT SERPL-MCNC: 16.2 MG/DL (ref 0.5–1.4)
DIFFERENTIAL METHOD: ABNORMAL
DIFFERENTIAL METHOD: ABNORMAL
EOSINOPHIL # BLD AUTO: 0 K/UL (ref 0–0.5)
EOSINOPHIL # BLD AUTO: 0.2 K/UL (ref 0–0.5)
EOSINOPHIL NFR BLD: 0.5 % (ref 0–8)
EOSINOPHIL NFR BLD: 2.6 % (ref 0–8)
ERYTHROCYTE [DISTWIDTH] IN BLOOD BY AUTOMATED COUNT: 13.1 % (ref 11.5–14.5)
ERYTHROCYTE [DISTWIDTH] IN BLOOD BY AUTOMATED COUNT: 13.1 % (ref 11.5–14.5)
EST. GFR  (AFRICAN AMERICAN): 4 ML/MIN/1.73 M^2
EST. GFR  (NON AFRICAN AMERICAN): 3 ML/MIN/1.73 M^2
GLUCOSE SERPL-MCNC: 126 MG/DL (ref 70–110)
HCT VFR BLD AUTO: 30 % (ref 40–54)
HCT VFR BLD AUTO: 32 % (ref 40–54)
HGB BLD-MCNC: 10.8 G/DL (ref 14–18)
HGB BLD-MCNC: 9.9 G/DL (ref 14–18)
IMM GRANULOCYTES # BLD AUTO: 0.02 K/UL (ref 0–0.04)
IMM GRANULOCYTES # BLD AUTO: 0.03 K/UL (ref 0–0.04)
IMM GRANULOCYTES NFR BLD AUTO: 0.2 % (ref 0–0.5)
IMM GRANULOCYTES NFR BLD AUTO: 0.4 % (ref 0–0.5)
INR PPP: 3.2 (ref 0.8–1.2)
LYMPHOCYTES # BLD AUTO: 0.8 K/UL (ref 1–4.8)
LYMPHOCYTES # BLD AUTO: 2.8 K/UL (ref 1–4.8)
LYMPHOCYTES NFR BLD: 30.9 % (ref 18–48)
LYMPHOCYTES NFR BLD: 9.7 % (ref 18–48)
MCH RBC QN AUTO: 34.8 PG (ref 27–31)
MCH RBC QN AUTO: 34.9 PG (ref 27–31)
MCHC RBC AUTO-ENTMCNC: 33 G/DL (ref 32–36)
MCHC RBC AUTO-ENTMCNC: 33.8 G/DL (ref 32–36)
MCV RBC AUTO: 103 FL (ref 82–98)
MCV RBC AUTO: 106 FL (ref 82–98)
MONOCYTES # BLD AUTO: 0.5 K/UL (ref 0.3–1)
MONOCYTES # BLD AUTO: 1 K/UL (ref 0.3–1)
MONOCYTES NFR BLD: 11.3 % (ref 4–15)
MONOCYTES NFR BLD: 5.7 % (ref 4–15)
NEUTROPHILS # BLD AUTO: 5 K/UL (ref 1.8–7.7)
NEUTROPHILS # BLD AUTO: 7.1 K/UL (ref 1.8–7.7)
NEUTROPHILS NFR BLD: 54.8 % (ref 38–73)
NEUTROPHILS NFR BLD: 83.6 % (ref 38–73)
NRBC BLD-RTO: 0 /100 WBC
NRBC BLD-RTO: 0 /100 WBC
PLATELET # BLD AUTO: 124 K/UL (ref 150–350)
PLATELET # BLD AUTO: 155 K/UL (ref 150–350)
PMV BLD AUTO: 10.8 FL (ref 9.2–12.9)
PMV BLD AUTO: 11 FL (ref 9.2–12.9)
POTASSIUM SERPL-SCNC: 4.6 MMOL/L (ref 3.5–5.1)
PROT SERPL-MCNC: 8.6 G/DL (ref 6–8.4)
PROTHROMBIN TIME: 32 SEC (ref 9–12.5)
RBC # BLD AUTO: 2.84 M/UL (ref 4.6–6.2)
RBC # BLD AUTO: 3.1 M/UL (ref 4.6–6.2)
SARS-COV-2 RDRP RESP QL NAA+PROBE: NEGATIVE
SODIUM SERPL-SCNC: 139 MMOL/L (ref 136–145)
WBC # BLD AUTO: 8.53 K/UL (ref 3.9–12.7)
WBC # BLD AUTO: 9.06 K/UL (ref 3.9–12.7)

## 2020-07-01 PROCEDURE — 36415 COLL VENOUS BLD VENIPUNCTURE: CPT

## 2020-07-01 PROCEDURE — U0002 COVID-19 LAB TEST NON-CDC: HCPCS

## 2020-07-01 PROCEDURE — 85025 COMPLETE CBC W/AUTO DIFF WBC: CPT | Mod: 91

## 2020-07-01 PROCEDURE — G0378 HOSPITAL OBSERVATION PER HR: HCPCS

## 2020-07-01 PROCEDURE — 99285 EMERGENCY DEPT VISIT HI MDM: CPT | Mod: 25

## 2020-07-01 PROCEDURE — 25000003 PHARM REV CODE 250: Performed by: NURSE PRACTITIONER

## 2020-07-01 PROCEDURE — 80053 COMPREHEN METABOLIC PANEL: CPT

## 2020-07-01 PROCEDURE — 25000003 PHARM REV CODE 250: Performed by: EMERGENCY MEDICINE

## 2020-07-01 PROCEDURE — 85610 PROTHROMBIN TIME: CPT

## 2020-07-01 PROCEDURE — 63600175 PHARM REV CODE 636 W HCPCS: Performed by: INTERNAL MEDICINE

## 2020-07-01 PROCEDURE — 87040 BLOOD CULTURE FOR BACTERIA: CPT

## 2020-07-01 PROCEDURE — 63600175 PHARM REV CODE 636 W HCPCS: Performed by: EMERGENCY MEDICINE

## 2020-07-01 PROCEDURE — 12001 RPR S/N/AX/GEN/TRNK 2.5CM/<: CPT

## 2020-07-01 PROCEDURE — 99215 PR OFFICE/OUTPT VISIT, EST, LEVL V, 40-54 MIN: ICD-10-PCS | Mod: ,,, | Performed by: INTERNAL MEDICINE

## 2020-07-01 PROCEDURE — 99215 OFFICE O/P EST HI 40 MIN: CPT | Mod: ,,, | Performed by: INTERNAL MEDICINE

## 2020-07-01 PROCEDURE — 25000003 PHARM REV CODE 250: Performed by: INTERNAL MEDICINE

## 2020-07-01 PROCEDURE — 63600175 PHARM REV CODE 636 W HCPCS: Performed by: NURSE PRACTITIONER

## 2020-07-01 PROCEDURE — 86901 BLOOD TYPING SEROLOGIC RH(D): CPT

## 2020-07-01 PROCEDURE — 96375 TX/PRO/DX INJ NEW DRUG ADDON: CPT

## 2020-07-01 PROCEDURE — 96374 THER/PROPH/DIAG INJ IV PUSH: CPT

## 2020-07-01 PROCEDURE — 85730 THROMBOPLASTIN TIME PARTIAL: CPT

## 2020-07-01 RX ORDER — HYDROMORPHONE HYDROCHLORIDE 1 MG/ML
0.5 INJECTION, SOLUTION INTRAMUSCULAR; INTRAVENOUS; SUBCUTANEOUS EVERY 4 HOURS PRN
Status: DISCONTINUED | OUTPATIENT
Start: 2020-07-01 | End: 2020-07-02

## 2020-07-01 RX ORDER — HYDRALAZINE HYDROCHLORIDE 50 MG/1
100 TABLET, FILM COATED ORAL EVERY 8 HOURS
Status: DISCONTINUED | OUTPATIENT
Start: 2020-07-01 | End: 2020-07-06 | Stop reason: HOSPADM

## 2020-07-01 RX ORDER — TENOFOVIR DISOPROXIL FUMARATE 300 MG/1
300 TABLET, FILM COATED ORAL WEEKLY
Status: DISCONTINUED | OUTPATIENT
Start: 2020-07-01 | End: 2020-07-06 | Stop reason: HOSPADM

## 2020-07-01 RX ORDER — DILTIAZEM HYDROCHLORIDE 180 MG/1
360 CAPSULE, COATED, EXTENDED RELEASE ORAL DAILY
Status: DISCONTINUED | OUTPATIENT
Start: 2020-07-01 | End: 2020-07-01 | Stop reason: SDUPTHER

## 2020-07-01 RX ORDER — FENTANYL CITRATE 50 UG/ML
50 INJECTION, SOLUTION INTRAMUSCULAR; INTRAVENOUS
Status: COMPLETED | OUTPATIENT
Start: 2020-07-01 | End: 2020-07-01

## 2020-07-01 RX ORDER — IPRATROPIUM BROMIDE AND ALBUTEROL SULFATE 2.5; .5 MG/3ML; MG/3ML
3 SOLUTION RESPIRATORY (INHALATION) EVERY 6 HOURS PRN
Status: DISCONTINUED | OUTPATIENT
Start: 2020-07-01 | End: 2020-07-06 | Stop reason: HOSPADM

## 2020-07-01 RX ORDER — CEFEPIME HYDROCHLORIDE 1 G/50ML
2 INJECTION, SOLUTION INTRAVENOUS
Status: DISCONTINUED | OUTPATIENT
Start: 2020-07-01 | End: 2020-07-06

## 2020-07-01 RX ORDER — LIDOCAINE HYDROCHLORIDE AND EPINEPHRINE 20; 10 MG/ML; UG/ML
1 INJECTION, SOLUTION INFILTRATION; PERINEURAL ONCE
Status: COMPLETED | OUTPATIENT
Start: 2020-07-01 | End: 2020-07-01

## 2020-07-01 RX ORDER — PANTOPRAZOLE SODIUM 40 MG/1
40 TABLET, DELAYED RELEASE ORAL DAILY
Status: DISCONTINUED | OUTPATIENT
Start: 2020-07-01 | End: 2020-07-06 | Stop reason: HOSPADM

## 2020-07-01 RX ORDER — CALCIUM ACETATE 667 MG/1
667 CAPSULE ORAL 3 TIMES DAILY
Status: DISCONTINUED | OUTPATIENT
Start: 2020-07-01 | End: 2020-07-04

## 2020-07-01 RX ORDER — SODIUM CHLORIDE 0.9 % (FLUSH) 0.9 %
10 SYRINGE (ML) INJECTION
Status: DISCONTINUED | OUTPATIENT
Start: 2020-07-01 | End: 2020-07-06 | Stop reason: HOSPADM

## 2020-07-01 RX ORDER — AMLODIPINE BESYLATE 10 MG/1
10 TABLET ORAL DAILY
Status: DISCONTINUED | OUTPATIENT
Start: 2020-07-01 | End: 2020-07-06 | Stop reason: HOSPADM

## 2020-07-01 RX ORDER — LISINOPRIL 20 MG/1
40 TABLET ORAL DAILY
Status: DISCONTINUED | OUTPATIENT
Start: 2020-07-01 | End: 2020-07-06 | Stop reason: HOSPADM

## 2020-07-01 RX ORDER — HYDROXYZINE PAMOATE 25 MG/1
25 CAPSULE ORAL EVERY 8 HOURS PRN
Status: DISCONTINUED | OUTPATIENT
Start: 2020-07-01 | End: 2020-07-06 | Stop reason: HOSPADM

## 2020-07-01 RX ADMIN — VANCOMYCIN HYDROCHLORIDE 2000 MG: 100 INJECTION, POWDER, LYOPHILIZED, FOR SOLUTION INTRAVENOUS at 04:07

## 2020-07-01 RX ADMIN — TENOFOVIR DISPROXIL FUMARATE 300 MG: 300 TABLET ORAL at 05:07

## 2020-07-01 RX ADMIN — PANTOPRAZOLE SODIUM 40 MG: 40 TABLET, DELAYED RELEASE ORAL at 02:07

## 2020-07-01 RX ADMIN — B-COMPLEX W/ C & FOLIC ACID TAB 1 MG 1 TABLET: 1 TAB at 04:07

## 2020-07-01 RX ADMIN — FENTANYL CITRATE 50 MCG: 50 INJECTION, SOLUTION INTRAMUSCULAR; INTRAVENOUS at 10:07

## 2020-07-01 RX ADMIN — CALCIUM ACETATE 667 MG: 667 CAPSULE ORAL at 02:07

## 2020-07-01 RX ADMIN — HYDRALAZINE HYDROCHLORIDE 100 MG: 50 TABLET, FILM COATED ORAL at 09:07

## 2020-07-01 RX ADMIN — AMLODIPINE BESYLATE 10 MG: 10 TABLET ORAL at 04:07

## 2020-07-01 RX ADMIN — CEFEPIME HYDROCHLORIDE 2 G: 2 INJECTION, SOLUTION INTRAVENOUS at 02:07

## 2020-07-01 RX ADMIN — LISINOPRIL 40 MG: 20 TABLET ORAL at 02:07

## 2020-07-01 RX ADMIN — HYDRALAZINE HYDROCHLORIDE 100 MG: 50 TABLET, FILM COATED ORAL at 02:07

## 2020-07-01 RX ADMIN — HYDROMORPHONE HYDROCHLORIDE 0.5 MG: 1 INJECTION, SOLUTION INTRAMUSCULAR; INTRAVENOUS; SUBCUTANEOUS at 08:07

## 2020-07-01 RX ADMIN — CALCIUM ACETATE 667 MG: 667 CAPSULE ORAL at 09:07

## 2020-07-01 RX ADMIN — LIDOCAINE HYDROCHLORIDE,EPINEPHRINE BITARTRATE 1 ML: 20; .01 INJECTION, SOLUTION INFILTRATION; PERINEURAL at 10:07

## 2020-07-01 NOTE — SUBJECTIVE & OBJECTIVE
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     HD M, W, F    ESRD on dialysis     Hepatitis B     Hyperlipidemia     Hypertension     DANIEL (obstructive sleep apnea) 11/12/2019    Secondary hyperparathyroidism of renal origin     Supraventricular tachycardia     atrial tachycardia    Tachycardia     Valvular regurgitation     AI, TR       Past Surgical History:   Procedure Laterality Date    ASD REPAIR      age 8 [facility]    AV Graft Creation Left 03/2017    [facility]      [facility]     CARDIAC VALVE SURGERY  02/08/2017    22 mm Medtronic AV, 28 mm TV Medtronic annuloplaty ring    COLONOSCOPY N/A 8/27/2019    Procedure: COLONOSCOPY;  Surgeon: Addie John MD;  Location: [facility] ENDO;  Service: Endoscopy;  Laterality: N/A;  dialysis pt *needs K*    ESOPHAGOGASTRODUODENOSCOPY N/A 8/27/2019    Procedure: EGD (ESOPHAGOGASTRODUODENOSCOPY);  Surgeon: Addie Jhon MD;  Location: [facility] ENDO;  Service: Endoscopy;  Laterality: N/A;    RADIOFREQUENCY ABLATION  03/13/2017    Atrial tachycardia       Review of patient's allergies indicates:  No Known Allergies    Current Facility-Administered Medications on File Prior to Encounter   Medication    0.9%  NaCl infusion    sodium chloride 0.9% flush 10 mL     Current Outpatient Medications on File Prior to Encounter   Medication Sig    albuterol (VENTOLIN HFA) 90 mcg/actuation inhaler Inhale 2 puffs into the lungs every 6 (six) hours as needed for Wheezing or Shortness of Breath. Rescue    amLODIPine (NORVASC) 10 MG tablet TAKE ONE TABLET BY MOUTH EVERY DAY    amoxicillin (AMOXIL) 500 MG capsule Take 1 capsule (500 mg total) by mouth 3 (three) times daily x 14 days    ascorbic acid, vitamin C, (VITAMIN C) 500 MG tablet Take 1  tablet (500 mg total) by mouth 2 (two) times daily.    b complex vitamins tablet Take 1 tablet by mouth once daily.    calcium acetate (PHOSLO) 667 mg capsule Take 1 capsule (667 mg total) by mouth 3 (three) times daily.    diltiaZEM (CARDIZEM CD) 360 MG 24 hr capsule Take 1 capsule (360 mg total) by mouth once daily.    enoxaparin (LOVENOX) 100 mg/mL Syrg Inject 0.9 mLs (90 mg total) into the skin once daily.    epoetin vikki (PROCRIT) 10,000 unit/mL injection Inject 1 mL (10,000 Units total) into the skin every Mon, Wed, Fri. To be given with HD    fish oil-omega-3 fatty acids 300-1,000 mg capsule Take 2 g by mouth once daily.    hydrALAZINE (APRESOLINE) 100 MG tablet TAKE ONE TABLET BY MOUTH THREE TIMES DAILY INCLUDING DIALYSIS DAYS    hydrOXYzine pamoate (VISTARIL) 25 MG Cap T1C PO QD PRN ITCHING    labetalol (NORMODYNE) 100 MG tablet TAKE ONE TABLET BY MOUTH THREE TIMES DAILY AS NEEDED FOR HEART RATE > 120    lisinopril (PRINIVIL,ZESTRIL) 40 MG tablet TAKE ONE TABLET BY MOUTH EVERY DAY    multivitamin with minerals tablet Take 1 tablet by mouth once daily.    omega-3 fatty acids-vitamin E (FISH OIL) 1,000 mg Cap Take 1 capsule by mouth once daily.    pantoprazole (PROTONIX) 40 MG tablet TAKE ONE TABLET BY MOUTH EVERY DAY    ETHAN-RACQUEL RX 1- mg-mg-mcg Tab TAKE ONE TABLET BY MOUTH EVERY DAY    tenofovir (VIREAD) 300 mg Tab Take 1 tablet (300 mg total) by mouth once a week.    vitamin D (VITAMIN D3) 1000 units Tab Take 1,000 Units by mouth 3 (three) times a week.    warfarin (COUMADIN) 7.5 MG tablet Take 2 tablets by mouth on Thursdays and 1 tablet on all other days of the week. (Patient taking differently: Take 2 tablets by mouth on Thursdays and Saturday; and 1 tablet on all other days of the week.)     Family History     Problem Relation (Age of Onset)    Anesthesia problems Paternal Uncle    Diabetes Paternal Aunt, Paternal Aunt    Heart attack Father    Heart failure Paternal Grandmother     Hypertension Paternal Aunt    Leukemia Mother, Paternal Aunt    No Known Problems Sister, Brother, Sister, Sister    Stroke Paternal Aunt    Suicide Paternal Uncle    Valvular heart disease Maternal Aunt        Tobacco Use    Smoking status: Never Smoker    Smokeless tobacco: Never Used   Substance and Sexual Activity    Alcohol use: No     Frequency: Never     Comment: quit in 2016; does have heavy drinking history; started drinking at age 13; was drinking a 12 pack over the weekend and two 24 ounces of beer a day during the week along with a pint of hard alcohol    Drug use: No    Sexual activity: Not on file     Review of Systems   Constitutional: Positive for diaphoresis. Negative for activity change, appetite change, fatigue and fever.   HENT: Negative.    Eyes: Negative.    Respiratory: Negative.  Negative for shortness of breath.    Cardiovascular: Negative.  Negative for chest pain and leg swelling.   Gastrointestinal: Negative.  Negative for abdominal pain, nausea and vomiting.   Endocrine: Negative.    Genitourinary: Negative.         On HD      Musculoskeletal: Negative.    Skin: Negative.         +left upper arm bleeding    Allergic/Immunologic: Negative.    Neurological: Positive for dizziness. Negative for syncope, weakness and light-headedness.   Hematological: Negative.    Psychiatric/Behavioral: Negative.      Objective:     Vital Signs (Most Recent):  Temp: 97.5 °F (36.4 °C) (07/01/20 1026)  Pulse: 72 (07/01/20 1246)  Resp: 14 (07/01/20 1246)  BP: 133/69 (07/01/20 1246)  SpO2: 95 % (07/01/20 1246) Vital Signs (24h Range):  Temp:  [97.5 °F (36.4 °C)] 97.5 °F (36.4 °C)  Pulse:  [] 72  Resp:  [14-31] 14  SpO2:  [95 %-99 %] 95 %  BP: (133-180)/(69-93) 133/69     Weight: 105.7 kg (233 lb)  Body mass index is 30.74 kg/m².    Physical Exam  Vitals signs and nursing note reviewed.   Constitutional:       Appearance: Normal appearance. He is well-developed.   HENT:      Head: Normocephalic and  atraumatic.   Eyes:      Pupils: Pupils are equal, round, and reactive to light.   Neck:      Musculoskeletal: Normal range of motion and neck supple.   Cardiovascular:      Rate and Rhythm: Normal rate and regular rhythm.      Pulses: Normal pulses.      Heart sounds: Normal heart sounds.      Comments: Audible click   Pulmonary:      Effort: Pulmonary effort is normal. No tachypnea, accessory muscle usage or respiratory distress.      Breath sounds: Normal breath sounds.   Abdominal:      General: Bowel sounds are normal.      Palpations: Abdomen is soft.      Tenderness: There is no abdominal tenderness.   Musculoskeletal: Normal range of motion.   Skin:     General: Skin is warm and dry.      Capillary Refill: Capillary refill takes less than 2 seconds.             Comments: LUE graft with negative thrill/bruit    Neurological:      Mental Status: He is alert and oriented to person, place, and time.      Deep Tendon Reflexes: Reflexes are normal and symmetric.   Psychiatric:         Speech: Speech normal.         Behavior: Behavior normal.         Thought Content: Thought content normal.         Judgment: Judgment normal.          Significant Labs:   Blood Culture: No results for input(s): LABBLOO in the last 48 hours.  BMP:   Recent Labs   Lab 07/01/20  1030   *      K 4.6   CL 95   CO2 24   BUN 72*   CREATININE 16.2*   CALCIUM 9.6     CBC:   Recent Labs   Lab 07/01/20  1030   WBC 9.06   HGB 10.8*   HCT 32.0*        CMP:   Recent Labs   Lab 07/01/20  1030      K 4.6   CL 95   CO2 24   *   BUN 72*   CREATININE 16.2*   CALCIUM 9.6   PROT 8.6*   ALBUMIN 3.4*   BILITOT 0.5   ALKPHOS 76   AST 17   ALT 18   ANIONGAP 20*   EGFRNONAA 3*     All pertinent labs within the past 24 hours have been reviewed.    Significant Imaging:   Imaging Results    None

## 2020-07-01 NOTE — PLAN OF CARE
Compression dressing to LUE, C/D/I. No active bleeding noted. Failed shunt to LUE. Heart monitor 8654. Pt oriented to room and call light. Voices no c/o's @ this time. Call light in reach.

## 2020-07-01 NOTE — ED NOTES
Patient identifiers verified and correct for Isidro White .    LOC: The patient is awake, alert and aware of environment with an appropriate affect, the patient is oriented x 3 and speaking appropriately.  APPEARANCE: Patient resting comfortably and in no acute distress, patient is clean and well groomed, patient's clothing is properly fastened.  SKIN: The skin is warm mildly diaphoretic, color consistent with ethnicity, patient has normal skin turgor and moist mucus membranes, skin intact, no breakdown or bruising noted.  MUSCULOSKELETAL: Patient moving all extremities spontaneously.  RESPIRATORY: Airway is open and patent, respirations are spontaneous.  CARDIAC: Patient has a normal rate, no periphreal edema noted, capillary refill < 3 seconds.  ABDOMEN: Soft and non tender to palpation.    L upper arm shunt bleeding after access today. Pt has tourniquet in place from EMS. Bleeding is controlled. Distal radial pulse present to L arm

## 2020-07-01 NOTE — PROGRESS NOTES
Pharmacist Renal Dose Adjustment Note    Isidro White Jr. is a 50 y.o. male being treated with the medication cefepime.    Patient Data:    Vital Signs (Most Recent):  Temp: 97.5 °F (36.4 °C) (07/01/20 1026)  Pulse: 72 (07/01/20 1246)  Resp: 14 (07/01/20 1246)  BP: 133/69 (07/01/20 1246)  SpO2: 95 % (07/01/20 1246) Vital Signs (72h Range):  Temp:  [97.5 °F (36.4 °C)]   Pulse:  []   Resp:  [14-31]   BP: (133-180)/(69-93)   SpO2:  [95 %-99 %]      Recent Labs   Lab 07/01/20  1030   CREATININE 16.2*     Serum creatinine: 16.2 mg/dL (H) 07/01/20 1030  Estimated creatinine clearance: 7 mL/min (A)    Cefepime 2 g IV q12 h has been changed to q24 h based on the renal dose adjustment protocol.    Pharmacist's Name: Smooth Gamble, PharmD 7/1/2020 1:52 PM    Pharmacist's Extension: [phone]

## 2020-07-01 NOTE — CONSULTS
[facility] -   General Surgery  Consult Note    Inpatient consult to Vascular Surgery  Consult performed by: David Gleason MD  Consult ordered by: Robyn Pittman DO        Subjective:     Chief Complaint/Reason for Admission: bleeding left arm access    History of Present Illness: asked to eval pt with bleeding left arm access.  I did see pt in my office for the first time yesterday.  He was sent for evaluation of an ulcerated possibly infected left arm avg graft that had been placed in [place] several years ago where he had been getting his care.  On exam the access did appear grossly infected and likely to continue to deteriorate.  I advised the pt that he would need to have this surgically repaired or removed but the pt adamantly refused any and all intervention and requested only that he be given antibiotics.  He was advised that his access would likely start to bleed and would then have to be addressed as an emergency.    Pt presented to ED this am with acutely bleeding shunt.  Several sutures placed by ED staff in addition to a tourniquet applied to the left upper arm.  On my arrival, the tourniquet was taken down and the bleeding access had stopped bleeding however, there was no longer a thrill and bruit in the shunt      Current Facility-Administered Medications on File Prior to Encounter   Medication    0.9%  NaCl infusion    sodium chloride 0.9% flush 10 mL     Current Outpatient Medications on File Prior to Encounter   Medication Sig    albuterol (VENTOLIN HFA) 90 mcg/actuation inhaler Inhale 2 puffs into the lungs every 6 (six) hours as needed for Wheezing or Shortness of Breath. Rescue    amLODIPine (NORVASC) 10 MG tablet TAKE ONE TABLET BY MOUTH EVERY DAY    amoxicillin (AMOXIL) 500 MG capsule Take 1 capsule (500 mg total) by mouth 3 (three) times daily x 14 days    ascorbic acid, vitamin C, (VITAMIN C) 500 MG tablet Take 1 tablet (500 mg total) by mouth 2 (two) times daily.     b complex vitamins tablet Take 1 tablet by mouth once daily.    calcium acetate (PHOSLO) 667 mg capsule Take 1 capsule (667 mg total) by mouth 3 (three) times daily.    diltiaZEM (CARDIZEM CD) 360 MG 24 hr capsule Take 1 capsule (360 mg total) by mouth once daily.    enoxaparin (LOVENOX) 100 mg/mL Syrg Inject 0.9 mLs (90 mg total) into the skin once daily.    epoetin vikki (PROCRIT) 10,000 unit/mL injection Inject 1 mL (10,000 Units total) into the skin every Mon, Wed, Fri. To be given with HD    fish oil-omega-3 fatty acids 300-1,000 mg capsule Take 2 g by mouth once daily.    hydrALAZINE (APRESOLINE) 100 MG tablet TAKE ONE TABLET BY MOUTH THREE TIMES DAILY INCLUDING DIALYSIS DAYS    hydrOXYzine pamoate (VISTARIL) 25 MG Cap T1C PO QD PRN ITCHING    labetalol (NORMODYNE) 100 MG tablet TAKE ONE TABLET BY MOUTH THREE TIMES DAILY AS NEEDED FOR HEART RATE > 120    lisinopril (PRINIVIL,ZESTRIL) 40 MG tablet TAKE ONE TABLET BY MOUTH EVERY DAY    multivitamin with minerals tablet Take 1 tablet by mouth once daily.    omega-3 fatty acids-vitamin E (FISH OIL) 1,000 mg Cap Take 1 capsule by mouth once daily.    pantoprazole (PROTONIX) 40 MG tablet TAKE ONE TABLET BY MOUTH EVERY DAY    ETHAN-RACQUEL RX 1- mg-mg-mcg Tab TAKE ONE TABLET BY MOUTH EVERY DAY    tenofovir (VIREAD) 300 mg Tab Take 1 tablet (300 mg total) by mouth once a week.    vitamin D (VITAMIN D3) 1000 units Tab Take 1,000 Units by mouth 3 (three) times a week.    warfarin (COUMADIN) 7.5 MG tablet Take 2 tablets by mouth on Thursdays and 1 tablet on all other days of the week. 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     HD M, W, F    ESRD on dialysis     Hepatitis B     Hyperlipidemia     Hypertension     DANIEL (obstructive sleep apnea) 11/12/2019    Secondary hyperparathyroidism of renal origin     Supraventricular tachycardia     atrial tachycardia    Tachycardia     Valvular regurgitation     AI, TR     Past Surgical History:   Procedure Laterality Date    ASD REPAIR      age 8 [facility]    AV Graft Creation Left 03/2017    [facility]      [facility]     CARDIAC VALVE SURGERY  02/08/2017    22 mm Medtronic AV, 28 mm TV Medtronic annuloplaty ring    COLONOSCOPY N/A 8/27/2019    Procedure: COLONOSCOPY;  Surgeon: Addie John MD;  Location: [facility] ENDO;  Service: Endoscopy;  Laterality: N/A;  dialysis pt *needs K*    ESOPHAGOGASTRODUODENOSCOPY N/A 8/27/2019    Procedure: EGD (ESOPHAGOGASTRODUODENOSCOPY);  Surgeon: Addie John MD;  Location: [facility] ENDO;  Service: Endoscopy;  Laterality: N/A;    RADIOFREQUENCY ABLATION  03/13/2017    Atrial tachycardia     Family History     Problem Relation (Age of Onset)    Anesthesia problems Paternal Uncle    Diabetes Paternal Aunt, Paternal Aunt    Heart attack Father    Heart failure Paternal Grandmother    Hypertension Paternal Aunt    Leukemia Mother, Paternal Aunt    No Known Problems Sister, Brother, Sister, Sister    Stroke Paternal Aunt    Suicide Paternal Uncle    Valvular heart disease Maternal Aunt        Tobacco Use    Smoking status: Never Smoker    Smokeless tobacco: Never Used   Substance and Sexual Activity    Alcohol use: No     Frequency: Never     Comment: quit in 2016; does have heavy drinking history; started drinking at age 13; was drinking a 12 pack over the weekend and two 24 ounces of beer a day during the week along with a pint of hard alcohol    Drug use: No    Sexual activity: Not on file     Review of Systems  Objective:     Vital Signs  (Most Recent):  Temp: 97.5 °F (36.4 °C) (07/01/20 1026)  Pulse: 100 (07/01/20 1047)  Resp: (!) 26 (07/01/20 1047)  BP: (!) 180/93 (07/01/20 1047)  SpO2: 98 % (07/01/20 1047) Vital Signs (24h Range):  Temp:  [97.5 °F (36.4 °C)] 97.5 °F (36.4 °C)  Pulse:  [] 100  Resp:  [26-31] 26  SpO2:  [98 %-99 %] 98 %  BP: (175-180)/(84-93) 180/93     Weight: 105.7 kg (233 lb)  Body mass index is 30.74 kg/m².    No intake or output data in the 24 hours ending 07/01/20 1144    Physical Exam    Significant Labs:  CBC:   Recent Labs   Lab 07/01/20  1030   WBC 9.06   RBC 3.10*   HGB 10.8*   HCT 32.0*      *   MCH 34.8*   MCHC 33.8     CMP:   Recent Labs   Lab 07/01/20  1030   *   CALCIUM 9.6   ALBUMIN 3.4*   PROT 8.6*      K 4.6   CO2 24   CL 95   BUN 72*   CREATININE 16.2*   ALKPHOS 76   ALT 18   AST 17   BILITOT 0.5       Significant Diagnostics:  I have reviewed all pertinent imaging results/findings within the past 24 hours.    Assessment/Plan:     There are no hospital problems to display for this patient.    51 y/o male esrd with infected acutely bleeding left arm access.  The access is now thrombosed and no longer viable.  WIll still need to be excised secondary to likelihood of the infect prosthetic graft.  WIll also need a vascath placed for continued HD.  WIll plan to remove access and place vascath tomorrow in OR.     If pt requires urgent HD today, will plan to place vascath only today and remove access at a later date.    Thank you for your consult. tho Gleason MD  General Surgery  [facility] -

## 2020-07-01 NOTE — H&P
[facility] Hospital Medicine  History & Physical    Patient Name: Isidro White Jr.  MRN: [id number]  Admission Date: 7/1/2020  Attending Physician: Nicolás Gerardo MD   Primary Care Provider: Miguel Campo MD         Patient information was obtained from patient and ER records.     Subjective:     Principal Problem:Arteriovenous graft infection    Chief Complaint:   Chief Complaint   Patient presents with    Shunt Problem     L shunt bleeding, controlled on arrival        HPI: Isidro White Jr. is a 50 y.o. male patient with a PMHx of Aortic valve stenosis, A-fib, CHF, ESRD, HTN,  who presents to the Emergency Department for evaluation of shunt problem which onset suddenly this Am. Pt states that he just began bleeding for no reason from his L arm shunt. Associated sxs include diaphoresis and dizzy. Patient denies any fever, chills, sore throat, cough, n/v/d, CP, SOB, HA, syncope, weakness, and all other sxs at this time. Pt states that he is on Coumadin at this time and is a [facility] dialysis pt. In the ED, pt acutely bleeding. ED physician placed several sutures and applied compression dressing. Bleed was then controlled. Dr Gleason (vascular surgery) was consulted in the ED, plans to remove access and place vascath tomorrow in OR. Labs unremarkable. Patient placed in observation for infected graft and further monitoring.       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     HD M, W, F    ESRD on dialysis     Hepatitis B     Hyperlipidemia     Hypertension     DANIEL (obstructive sleep apnea) 11/12/2019    Secondary hyperparathyroidism of renal origin     Supraventricular tachycardia     atrial tachycardia    Tachycardia     Valvular regurgitation     AI, TR       Past  Surgical History:   Procedure Laterality Date    ASD REPAIR      age 8 [facility]    AV Graft Creation Left 03/2017    C[facility]AC [facility]IZATION      [facility]     CARDIAC VALVE SURGERY  02/08/2017    22 mm Medtronic AV, 28 mm TV Medtronic annuloplaty ring    COLONOSCOPY N/A 8/27/2019    Procedure: COLONOSCOPY;  Surgeon: Addie John MD;  Location: [facility];  Service: Endoscopy;  Laterality: N/A;  dialysis pt *needs K*    ESOPHAGOGASTRODUODENOSCOPY N/A 8/27/2019    Procedure: EGD (ESOPHAGOGASTRODUODENOSCOPY);  Surgeon: Addie John MD;  Location: [facility];  Service: Endoscopy;  Laterality: N/A;    RADIOFREQUENCY ABLATION  03/13/2017    Atrial tachycardia       Review of patient's allergies indicates:  No Known Allergies    Current Facility-Administered Medications on File Prior to Encounter   Medication    0.9%  NaCl infusion    sodium chloride 0.9% flush 10 mL     Current Outpatient Medications on File Prior to Encounter   Medication Sig    albuterol (VENTOLIN HFA) 90 mcg/actuation inhaler Inhale 2 puffs into the lungs every 6 (six) hours as needed for Wheezing or Shortness of Breath. Rescue    amLODIPine (NORVASC) 10 MG tablet TAKE ONE TABLET BY MOUTH EVERY DAY    amoxicillin (AMOXIL) 500 MG capsule Take 1 capsule (500 mg total) by mouth 3 (three) times daily x 14 days    ascorbic acid, vitamin C, (VITAMIN C) 500 MG tablet Take 1 tablet (500 mg total) by mouth 2 (two) times daily.    b complex vitamins tablet Take 1 tablet by mouth once daily.    calcium acetate (PHOSLO) 667 mg capsule Take 1 capsule (667 mg total) by mouth 3 (three) times daily.    diltiaZEM (CARDIZEM CD) 360 MG 24 hr capsule Take 1 capsule (360 mg total) by mouth once daily.    enoxaparin (LOVENOX) 100 mg/mL Syrg Inject 0.9 mLs (90 mg total) into the skin once daily.    epoetin vikki (PROCRIT) 10,000 unit/mL injection Inject 1 mL (10,000 Units total) into the skin every Mon, Wed, Fri. To be given with HD    fish  oil-omega-3 fatty acids 300-1,000 mg capsule Take 2 g by mouth once daily.    hydrALAZINE (APRESOLINE) 100 MG tablet TAKE ONE TABLET BY MOUTH THREE TIMES DAILY INCLUDING DIALYSIS DAYS    hydrOXYzine pamoate (VISTARIL) 25 MG Cap T1C PO QD PRN ITCHING    labetalol (NORMODYNE) 100 MG tablet TAKE ONE TABLET BY MOUTH THREE TIMES DAILY AS NEEDED FOR HEART RATE > 120    lisinopril (PRINIVIL,ZESTRIL) 40 MG tablet TAKE ONE TABLET BY MOUTH EVERY DAY    multivitamin with minerals tablet Take 1 tablet by mouth once daily.    omega-3 fatty acids-vitamin E (FISH OIL) 1,000 mg Cap Take 1 capsule by mouth once daily.    pantoprazole (PROTONIX) 40 MG tablet TAKE ONE TABLET BY MOUTH EVERY DAY    ETHAN-RACQUEL RX 1- mg-mg-mcg Tab TAKE ONE TABLET BY MOUTH EVERY DAY    tenofovir (VIREAD) 300 mg Tab Take 1 tablet (300 mg total) by mouth once a week.    vitamin D (VITAMIN D3) 1000 units Tab Take 1,000 Units by mouth 3 (three) times a week.    warfarin (COUMADIN) 7.5 MG tablet Take 2 tablets by mouth on Thursdays and 1 tablet on all other days of the week. (Patient taking differently: Take 2 tablets by mouth on Thursdays and Saturday; and 1 tablet on all other days of the week.)     Family History     Problem Relation (Age of Onset)    Anesthesia problems Paternal Uncle    Diabetes Paternal Aunt, Paternal Aunt    Heart attack Father    Heart failure Paternal Grandmother    Hypertension Paternal Aunt    Leukemia Mother, Paternal Aunt    No Known Problems Sister, Brother, Sister, Sister    Stroke Paternal Aunt    Suicide Paternal Uncle    Valvular heart disease Maternal Aunt        Tobacco Use    Smoking status: Never Smoker    Smokeless tobacco: Never Used   Substance and Sexual Activity    Alcohol use: No     Frequency: Never     Comment: quit in 2016; does have heavy drinking history; started drinking at age 13; was drinking a 12 pack over the weekend and two 24 ounces of beer a day during the week along with a pint of  hard alcohol    Drug use: No    Sexual activity: Not on file     Review of Systems   Constitutional: Positive for diaphoresis. Negative for activity change, appetite change, fatigue and fever.   HENT: Negative.    Eyes: Negative.    Respiratory: Negative.  Negative for shortness of breath.    Cardiovascular: Negative.  Negative for chest pain and leg swelling.   Gastrointestinal: Negative.  Negative for abdominal pain, nausea and vomiting.   Endocrine: Negative.    Genitourinary: Negative.         On HD      Musculoskeletal: Negative.    Skin: Negative.         +left upper arm bleeding    Allergic/Immunologic: Negative.    Neurological: Positive for dizziness. Negative for syncope, weakness and light-headedness.   Hematological: Negative.    Psychiatric/Behavioral: Negative.      Objective:     Vital Signs (Most Recent):  Temp: 97.5 °F (36.4 °C) (07/01/20 1026)  Pulse: 72 (07/01/20 1246)  Resp: 14 (07/01/20 1246)  BP: 133/69 (07/01/20 1246)  SpO2: 95 % (07/01/20 1246) Vital Signs (24h Range):  Temp:  [97.5 °F (36.4 °C)] 97.5 °F (36.4 °C)  Pulse:  [] 72  Resp:  [14-31] 14  SpO2:  [95 %-99 %] 95 %  BP: (133-180)/(69-93) 133/69     Weight: 105.7 kg (233 lb)  Body mass index is 30.74 kg/m².    Physical Exam  Vitals signs and nursing note reviewed.   Constitutional:       Appearance: Normal appearance. He is well-developed.   HENT:      Head: Normocephalic and atraumatic.   Eyes:      Pupils: Pupils are equal, round, and reactive to light.   Neck:      Musculoskeletal: Normal range of motion and neck supple.   Cardiovascular:      Rate and Rhythm: Normal rate and regular rhythm.      Pulses: Normal pulses.      Heart sounds: Normal heart sounds.      Comments: Audible click   Pulmonary:      Effort: Pulmonary effort is normal. No tachypnea, accessory muscle usage or respiratory distress.      Breath sounds: Normal breath sounds.   Abdominal:      General: Bowel sounds are normal.      Palpations: Abdomen is soft.       Tenderness: There is no abdominal tenderness.   Musculoskeletal: Normal range of motion.   Skin:     General: Skin is warm and dry.      Capillary Refill: Capillary refill takes less than 2 seconds.             Comments: LUE graft with negative thrill/bruit    Neurological:      Mental Status: He is alert and oriented to person, place, and time.      Deep Tendon Reflexes: Reflexes are normal and symmetric.   Psychiatric:         Speech: Speech normal.         Behavior: Behavior normal.         Thought Content: Thought content normal.         Judgment: Judgment normal.          Significant Labs:   Blood Culture: No results for input(s): LABBLOO in the last 48 hours.  BMP:   Recent Labs   Lab 07/01/20  1030   *      K 4.6   CL 95   CO2 24   BUN 72*   CREATININE 16.2*   CALCIUM 9.6     CBC:   Recent Labs   Lab 07/01/20  1030   WBC 9.06   HGB 10.8*   HCT 32.0*        CMP:   Recent Labs   Lab 07/01/20  1030      K 4.6   CL 95   CO2 24   *   BUN 72*   CREATININE 16.2*   CALCIUM 9.6   PROT 8.6*   ALBUMIN 3.4*   BILITOT 0.5   ALKPHOS 76   AST 17   ALT 18   ANIONGAP 20*   EGFRNONAA 3*     All pertinent labs within the past 24 hours have been reviewed.    Significant Imaging:   Imaging Results    None       Assessment/Plan:     * Arteriovenous graft infection  Bleeding has stopped  H/H stable  Repeat CBC pending   Follow blood cx  IV vancomycin and cefepime   Dr Gleason on board   Plan to remove access and place vascath tomorrow in OR.       Atrial fibrillation  Coumadin on hold due to acute blood loss and sx planned for tomorrow   Heart rate controlled        ESRD (end stage renal disease) on dialysis  HD on M/W/F   Nephrology consulted for HD management       Essential hypertension  Continue home meds   Monitor         VTE Risk Mitigation (From admission, onward)         Ordered     IP VTE HIGH RISK PATIENT  Once      07/01/20 1340     Place sequential compression device  Until  discontinued      07/01/20 1340                   Roro Zurita NP  Department of Hospital Medicine   [facility]

## 2020-07-01 NOTE — H&P (VIEW-ONLY)
[facility] -   General Surgery  Consult Note    Inpatient consult to Vascular Surgery  Consult performed by: David Gleason MD  Consult ordered by: Robyn Pittman DO        Subjective:     Chief Complaint/Reason for Admission: bleeding left arm access    History of Present Illness: asked to eval pt with bleeding left arm access.  I did see pt in my office for the first time yesterday.  He was sent for evaluation of an ulcerated possibly infected left arm avg graft that had been placed in [place] several years ago where he had been getting his care.  On exam the access did appear grossly infected and likely to continue to deteriorate.  I advised the pt that he would need to have this surgically repaired or removed but the pt adamantly refused any and all intervention and requested only that he be given antibiotics.  He was advised that his access would likely start to bleed and would then have to be addressed as an emergency.    Pt presented to ED this am with acutely bleeding shunt.  Several sutures placed by ED staff in addition to a tourniquet applied to the left upper arm.  On my arrival, the tourniquet was taken down and the bleeding access had stopped bleeding however, there was no longer a thrill and bruit in the shunt      Current Facility-Administered Medications on File Prior to Encounter   Medication    0.9%  NaCl infusion    sodium chloride 0.9% flush 10 mL     Current Outpatient Medications on File Prior to Encounter   Medication Sig    albuterol (VENTOLIN HFA) 90 mcg/actuation inhaler Inhale 2 puffs into the lungs every 6 (six) hours as needed for Wheezing or Shortness of Breath. Rescue    amLODIPine (NORVASC) 10 MG tablet TAKE ONE TABLET BY MOUTH EVERY DAY    amoxicillin (AMOXIL) 500 MG capsule Take 1 capsule (500 mg total) by mouth 3 (three) times daily x 14 days    ascorbic acid, vitamin C, (VITAMIN C) 500 MG tablet Take 1 tablet (500 mg total) by mouth 2 (two) times daily.     b complex vitamins tablet Take 1 tablet by mouth once daily.    calcium acetate (PHOSLO) 667 mg capsule Take 1 capsule (667 mg total) by mouth 3 (three) times daily.    diltiaZEM (CARDIZEM CD) 360 MG 24 hr capsule Take 1 capsule (360 mg total) by mouth once daily.    enoxaparin (LOVENOX) 100 mg/mL Syrg Inject 0.9 mLs (90 mg total) into the skin once daily.    epoetin vikki (PROCRIT) 10,000 unit/mL injection Inject 1 mL (10,000 Units total) into the skin every Mon, Wed, Fri. To be given with HD    fish oil-omega-3 fatty acids 300-1,000 mg capsule Take 2 g by mouth once daily.    hydrALAZINE (APRESOLINE) 100 MG tablet TAKE ONE TABLET BY MOUTH THREE TIMES DAILY INCLUDING DIALYSIS DAYS    hydrOXYzine pamoate (VISTARIL) 25 MG Cap T1C PO QD PRN ITCHING    labetalol (NORMODYNE) 100 MG tablet TAKE ONE TABLET BY MOUTH THREE TIMES DAILY AS NEEDED FOR HEART RATE > 120    lisinopril (PRINIVIL,ZESTRIL) 40 MG tablet TAKE ONE TABLET BY MOUTH EVERY DAY    multivitamin with minerals tablet Take 1 tablet by mouth once daily.    omega-3 fatty acids-vitamin E (FISH OIL) 1,000 mg Cap Take 1 capsule by mouth once daily.    pantoprazole (PROTONIX) 40 MG tablet TAKE ONE TABLET BY MOUTH EVERY DAY    ETHAN-RACQUEL RX 1- mg-mg-mcg Tab TAKE ONE TABLET BY MOUTH EVERY DAY    tenofovir (VIREAD) 300 mg Tab Take 1 tablet (300 mg total) by mouth once a week.    vitamin D (VITAMIN D3) 1000 units Tab Take 1,000 Units by mouth 3 (three) times a week.    warfarin (COUMADIN) 7.5 MG tablet Take 2 tablets by mouth on Thursdays and 1 tablet on all other days of the week. 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     HD M, W, F    ESRD on dialysis     Hepatitis B     Hyperlipidemia     Hypertension     DANIEL (obstructive sleep apnea) 11/12/2019    Secondary hyperparathyroidism of renal origin     Supraventricular tachycardia     atrial tachycardia    Tachycardia     Valvular regurgitation     AI, TR     Past Surgical History:   Procedure Laterality Date    ASD REPAIR      age 8 [facility]    AV Graft Creation Left 03/2017    C[facility]ATION      [facility]     CARDIAC VALVE SURGERY  02/08/2017    22 mm Medtronic AV, 28 mm TV Medtronic annuloplaty ring    COLONOSCOPY N/A 8/27/2019    Procedure: COLONOSCOPY;  Surgeon: Addie John MD;  Location: [facility] ENDO;  Service: Endoscopy;  Laterality: N/A;  dialysis pt *needs K*    ESOPHAGOGASTRODUODENOSCOPY N/A 8/27/2019    Procedure: EGD (ESOPHAGOGASTRODUODENOSCOPY);  Surgeon: Addie John MD;  Location: [facility] ENDO;  Service: Endoscopy;  Laterality: N/A;    RADIOFREQUENCY ABLATION  03/13/2017    Atrial tachycardia     Family History     Problem Relation (Age of Onset)    Anesthesia problems Paternal Uncle    Diabetes Paternal Aunt, Paternal Aunt    Heart attack Father    Heart failure Paternal Grandmother    Hypertension Paternal Aunt    Leukemia Mother, Paternal Aunt    No Known Problems Sister, Brother, Sister, Sister    Stroke Paternal Aunt    Suicide Paternal Uncle    Valvular heart disease Maternal Aunt        Tobacco Use    Smoking status: Never Smoker    Smokeless tobacco: Never Used   Substance and Sexual Activity    Alcohol use: No     Frequency: Never     Comment: quit in 2016; does have heavy drinking history; started drinking at age 13; was drinking a 12 pack over the weekend and two 24 ounces of beer a day during the week along with a pint of hard alcohol    Drug use: No    Sexual activity: Not on file     Review of Systems  Objective:     Vital Signs  (Most Recent):  Temp: 97.5 °F (36.4 °C) (07/01/20 1026)  Pulse: 100 (07/01/20 1047)  Resp: (!) 26 (07/01/20 1047)  BP: (!) 180/93 (07/01/20 1047)  SpO2: 98 % (07/01/20 1047) Vital Signs (24h Range):  Temp:  [97.5 °F (36.4 °C)] 97.5 °F (36.4 °C)  Pulse:  [] 100  Resp:  [26-31] 26  SpO2:  [98 %-99 %] 98 %  BP: (175-180)/(84-93) 180/93     Weight: 105.7 kg (233 lb)  Body mass index is 30.74 kg/m².    No intake or output data in the 24 hours ending 07/01/20 1144    Physical Exam    Significant Labs:  CBC:   Recent Labs   Lab 07/01/20  1030   WBC 9.06   RBC 3.10*   HGB 10.8*   HCT 32.0*      *   MCH 34.8*   MCHC 33.8     CMP:   Recent Labs   Lab 07/01/20  1030   *   CALCIUM 9.6   ALBUMIN 3.4*   PROT 8.6*      K 4.6   CO2 24   CL 95   BUN 72*   CREATININE 16.2*   ALKPHOS 76   ALT 18   AST 17   BILITOT 0.5       Significant Diagnostics:  I have reviewed all pertinent imaging results/findings within the past 24 hours.    Assessment/Plan:     There are no hospital problems to display for this patient.    49 y/o male esrd with infected acutely bleeding left arm access.  The access is now thrombosed and no longer viable.  WIll still need to be excised secondary to likelihood of the infect prosthetic graft.  WIll also need a vascath placed for continued HD.  WIll plan to remove access and place vascath tomorrow in OR.     If pt requires urgent HD today, will plan to place vascath only today and remove access at a later date.    Thank you for your consult. tho Gleason MD  General Surgery  [facility]

## 2020-07-01 NOTE — HPI
Isidro White Jr. is a 50 y.o. male patient with a PMHx of Aortic valve stenosis, A-fib, CHF, ESRD, HTN,  who presents to the Emergency Department for evaluation of shunt problem which onset suddenly this Am. Pt states that he just began bleeding for no reason from his L arm shunt. Associated sxs include diaphoresis and dizzy. Patient denies any fever, chills, sore throat, cough, n/v/d, CP, SOB, HA, syncope, weakness, and all other sxs at this time. Pt states that he is on Coumadin at this time and is a MWF dialysis pt. In the ED, pt acutely bleeding and was given 2 units of emergent blood. ED physician placed several sutures and applied compression dressing. Bleed was then controlled. Dr Gleason (vascular surgery) was consulted in the ED, plans to remove access and place vascath tomorrow in OR. Labs unremarkable. Patient placed in observation for infected graft and further monitoring.

## 2020-07-01 NOTE — PROGRESS NOTES
Pharmacokinetic Initial Assessment: IV Vancomycin    Assessment/Plan:    Initiate intravenous vancomycin with loading dose of 2000 mg once with subsequent doses when pre-HD random concentrations are less than 25 mcg/mL.   Desired empiric serum trough concentration is 10 to 20 mcg/mL. Draw vancomycin random level on 7/3 at 0430..    Pharmacy will continue to follow and monitor vancomycin.      Please contact pharmacy at extension [phone] with any questions regarding this assessment.     Thank you for the consult,   Smooth Gamble, David 7/1/2020 5:02 PM         Patient brief summary:  Isidor White Jr. is a 50 y.o. male initiated on antimicrobial therapy with IV Vancomycin for treatment of suspected  Arteriovenous graft infection .    Drug Allergies:   Review of patient's allergies indicates:  No Known Allergies    Actual Body Weight:   103 kg    Renal Function:   Estimated Creatinine Clearance: 7.1 mL/min (A) (based on SCr of 16.2 mg/dL (H)).,     Dialysis Method (if applicable):  intermittent HD    CBC (last 72 hours):  Recent Labs   Lab Result Units 07/01/20  1030 07/01/20  1433   WBC K/uL 9.06 8.53   Hemoglobin g/dL 10.8* 9.9*   Hematocrit % 32.0* 30.0*   Platelets K/uL 155 124*   Gran% % 54.8 83.6*   Lymph% % 30.9 9.7*   Mono% % 11.3 5.7   Eosinophil% % 2.6 0.5   Basophil% % 0.2 0.1   Differential Method  Automated Automated       Metabolic Panel (last 72 hours):  Recent Labs   Lab Result Units 07/01/20  1030   Sodium mmol/L 139   Potassium mmol/L 4.6   Chloride mmol/L 95   CO2 mmol/L 24   Glucose mg/dL 126*   BUN, Bld mg/dL 72*   Creatinine mg/dL 16.2*   Albumin g/dL 3.4*   Total Bilirubin mg/dL 0.5   Alkaline Phosphatase U/L 76   AST U/L 17   ALT U/L 18       Drug levels (last 3 results):  No results for input(s): VANCOMYCINRA, VANCOMYCINPE, VANCOMYCINTR in the last 72 hours.    Microbiologic Results:  Microbiology Results (last 7 days)       Procedure Component Value Units Date/Time    Blood culture  [[id number]] Collected: 07/01/20 1434    Order Status: Sent Specimen: Blood Updated: 07/01/20 1434    Blood culture [[id number]] Collected: 07/01/20 1433    Order Status: Sent Specimen: Blood Updated: 07/01/20 1433

## 2020-07-01 NOTE — ASSESSMENT & PLAN NOTE
Bleeding has stopped  H/H stable  Repeat CBC pending   Follow blood cx  IV vancomycin and cefepime   Dr Gleason on board   Plan to remove access and place vascath tomorrow in OR.

## 2020-07-01 NOTE — ED PROVIDER NOTES
SCRIBE #1 NOTE: I, Gavin Arevalo, am scribing for, and in the presence of, Robyn Pittman DO. I have scribed the entire note.       History     Chief Complaint   Patient presents with    Shunt Problem     L shunt bleeding, controlled on arrival     Review of patient's allergies indicates:  No Known Allergies      History of Present Illness     HPI    7/1/2020, 10:32 AM  History obtained from the patient      History of Present Illness: Isidro White Jr. is a 50 y.o. male patient with a PMHx of Aortic valve stenosis, A-fib, CHF, ESRD, HTN,  who presents to the Emergency Department for evaluation of shunt problem which onset gradually just PTA. Pt states that he just began bleeding for no reason from his L arm shunt.  Patient had been seen by vascular surgery the prior day and was diagnosed with a shunt infection.  Shunt has been present for approximately 3 years.  Symptoms are constant and moderate in severity. No mitigating or exacerbating factors reported. Associated sxs include diaphoresis. Patient denies any fever, chills, sore throat, cough, n/v/d, CP, SOB, HA, syncope, weakness, and all other sxs at this time. No further complaints or concerns at this time. Pt states that he is on Coumadin at this time and is a [facility] dialysis pt.  Patient also on warfarin.      Arrival mode: EMS     PCP: Miguel Campo MD        Past Medical History:  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     HD M, W, F    ESRD on dialysis     Hepatitis B     Hyperlipidemia     Hypertension     DANIEL (obstructive sleep apnea) 11/12/2019    Secondary hyperparathyroidism of renal origin     Supraventricular tachycardia     atrial tachycardia    Tachycardia     Valvular regurgitation     AI, TR       Past  Surgical History:  Past Surgical History:   Procedure Laterality Date    ASD REPAIR      age 8 [facility]    AV Graft Creation Left 03/2017    [facility]      [facility]     CARDIAC VALVE SURGERY  02/08/2017    22 mm Medtronic AV, 28 mm TV Medtronic annuloplaty ring    COLONOSCOPY N/A 8/27/2019    Procedure: COLONOSCOPY;  Surgeon: Addie John MD;  Location: [facility];  Service: Endoscopy;  Laterality: N/A;  dialysis pt *needs K*    ESOPHAGOGASTRODUODENOSCOPY N/A 8/27/2019    Procedure: EGD (ESOPHAGOGASTRODUODENOSCOPY);  Surgeon: Addie John MD;  Location: [facility];  Service: Endoscopy;  Laterality: N/A;    RADIOFREQUENCY ABLATION  03/13/2017    Atrial tachycardia         Family History:  Family History   Problem Relation Age of Onset    Leukemia Mother     Heart attack Father         massive MI at age 67    No Known Problems Sister     No Known Problems Brother     No Known Problems Sister     No Known Problems Sister     Heart failure Paternal Grandmother     Diabetes Paternal Aunt     Hypertension Paternal Aunt     Leukemia Paternal Aunt         CLL    Suicide Paternal Uncle     Anesthesia problems Paternal Uncle     Diabetes Paternal Aunt     Stroke Paternal Aunt     Valvular heart disease Maternal Aunt     Kidney disease Neg Hx     Colon cancer Neg Hx        Social History:  Social History     Tobacco Use    Smoking status: Never Smoker    Smokeless tobacco: Never Used   Substance and Sexual Activity    Alcohol use: No     Frequency: Never     Comment: quit in 2016; does have heavy drinking history; started drinking at age 13; was drinking a 12 pack over the weekend and two 24 ounces of beer a day during the week along with a pint of hard alcohol    Drug use: No    Sexual activity: Unknown        Review of Systems     Review of Systems   Constitutional: Positive for diaphoresis. Negative for chills and fever.        (+) Shunt problem   HENT: Negative  for congestion, rhinorrhea and sore throat.    Respiratory: Negative for cough and shortness of breath.    Cardiovascular: Negative for chest pain.   Gastrointestinal: Negative for abdominal pain, diarrhea, nausea and vomiting.   Genitourinary: Negative for dysuria.   Musculoskeletal: Negative for back pain.   Skin: Negative for rash.   Neurological: Negative for dizziness, seizures, syncope, weakness, light-headedness, numbness and headaches.   Hematological: Does not bruise/bleed easily.   All other systems reviewed and are negative.       Physical Exam     Initial Vitals [07/01/20 1026]   BP Pulse Resp Temp SpO2   (!) 175/84 98 (!) 31 97.5 °F (36.4 °C) 99 %      MAP       --          Physical Exam  Nursing Notes and Vital Signs Reviewed.  Constitutional: Patient is in mild distress. Well-developed and well-nourished. Diaphoretic  Head: Atraumatic. Normocephalic.  Eyes: PERRL. EOM intact. Conjunctivae are not pale. No scleral icterus.  ENT: Mucous membranes are moist. Oropharynx is clear and symmetric.    Neck: Supple. Full ROM. No lymphadenopathy.  Cardiovascular: Regular rate. Regular rhythm. No murmurs, rubs, or gallops. Distal pulses are 2+ and symmetric.  Pulmonary/Chest: No respiratory distress. Clear to auscultation bilaterally. No wheezing or rales.  Abdominal: Soft and non-distended.  There is no tenderness.  No rebound, guarding, or rigidity. Good bowel sounds.  Genitourinary: No CVA tenderness  Musculoskeletal: Moves all extremities. No obvious deformities. No edema. No calf tenderness.  There is a shunt to the left upper arm.  There is approximately a 5 mm ulcer which is present with necrotic skin tissue.  Tourniquet is in place.  Radial pulses are present distally.  Patient able to wiggle fingers.  Skin: Warm and dry.  Neurological:  Alert, awake, and appropriate.  Normal speech.  No acute focal neurological deficits are appreciated.  Psychiatric: Normal affect. Good eye contact. Appropriate in  "content.     ED Course   Lac Repair    Date/Time: 7/1/2020 10:35 AM  Performed by: Robyn Pittman DO  Authorized by: Nicolás Gerardo MD   Body area: upper extremity  Location details: left elbow  Laceration length: 0.5 (Overlying the shunt there is a 5 mm area of ulceration with bleeding.) cm  Anesthesia: local infiltration    Anesthesia:  Local Anesthetic: lidocaine 2% with epinephrine  Patient sedated: no  Preparation: Patient was prepped and draped in the usual sterile fashion.  Skin closure: 4-0 Prolene  Comments: The area was cleaned with Hibiclens.  A purse stitch was attempted with 4-0 Prolene.  However the stitch did not hold secondary to necrotic tissue in the stitch pulled out.        ED Vital Signs:  Vitals:    07/01/20 1026 07/01/20 1037 07/01/20 1047 07/01/20 1246   BP: (!) 175/84  (!) 180/93 133/69   Pulse: 98  100 72   Resp: (!) 31 (!) 26 (!) 26 14   Temp: 97.5 °F (36.4 °C)      TempSrc: Axillary      SpO2: 99%  98% 95%   Weight: 105.7 kg (233 lb)      Height:        07/01/20 1436 07/01/20 1614   BP: (!) 191/90 (!) 170/87   Pulse: 84 82   Resp: 18 16   Temp: 97.7 °F (36.5 °C) 97.7 °F (36.5 °C)   TempSrc: Oral Oral   SpO2: 97% 98%   Weight: 103 kg (227 lb 1.2 oz)    Height: 6' 2" (1.88 m)        Abnormal Lab Results:  Labs Reviewed   CBC W/ AUTO DIFFERENTIAL - Abnormal; Notable for the following components:       Result Value    RBC 3.10 (*)     Hemoglobin 10.8 (*)     Hematocrit 32.0 (*)     Mean Corpuscular Volume 103 (*)     Mean Corpuscular Hemoglobin 34.8 (*)     All other components within normal limits   COMPREHENSIVE METABOLIC PANEL - Abnormal; Notable for the following components:    Glucose 126 (*)     BUN, Bld 72 (*)     Creatinine 16.2 (*)     Total Protein 8.6 (*)     Albumin 3.4 (*)     Anion Gap 20 (*)     eGFR if  4 (*)     eGFR if non  3 (*)     All other components within normal limits   PROTIME-INR - Abnormal; Notable for the following " components:    Prothrombin Time 32.0 (*)     INR 3.2 (*)     All other components within normal limits   APTT - Abnormal; Notable for the following components:    aPTT 52.3 (*)     All other components within normal limits   SARS-COV-2 RNA AMPLIFICATION, QUAL   TYPE & SCREEN        All Lab Results:  Results for orders placed or performed during the hospital encounter of 07/01/20   CBC auto differential   Result Value Ref Range    WBC 9.06 3.90 - 12.70 K/uL    RBC 3.10 (L) 4.60 - 6.20 M/uL    Hemoglobin 10.8 (L) 14.0 - 18.0 g/dL    Hematocrit 32.0 (L) 40.0 - 54.0 %    Mean Corpuscular Volume 103 (H) 82 - 98 fL    Mean Corpuscular Hemoglobin 34.8 (H) 27.0 - 31.0 pg    Mean Corpuscular Hemoglobin Conc 33.8 32.0 - 36.0 g/dL    RDW 13.1 11.5 - 14.5 %    Platelets 155 150 - 350 K/uL    MPV 10.8 9.2 - 12.9 fL    Immature Granulocytes 0.2 0.0 - 0.5 %    Gran # (ANC) 5.0 1.8 - 7.7 K/uL    Immature Grans (Abs) 0.02 0.00 - 0.04 K/uL    Lymph # 2.8 1.0 - 4.8 K/uL    Mono # 1.0 0.3 - 1.0 K/uL    Eos # 0.2 0.0 - 0.5 K/uL    Baso # 0.02 0.00 - 0.20 K/uL    nRBC 0 0 /100 WBC    Gran% 54.8 38.0 - 73.0 %    Lymph% 30.9 18.0 - 48.0 %    Mono% 11.3 4.0 - 15.0 %    Eosinophil% 2.6 0.0 - 8.0 %    Basophil% 0.2 0.0 - 1.9 %    Differential Method Automated    Comprehensive metabolic panel   Result Value Ref Range    Sodium 139 136 - 145 mmol/L    Potassium 4.6 3.5 - 5.1 mmol/L    Chloride 95 95 - 110 mmol/L    CO2 24 23 - 29 mmol/L    Glucose 126 (H) 70 - 110 mg/dL    BUN, Bld 72 (H) 6 - 20 mg/dL    Creatinine 16.2 (H) 0.5 - 1.4 mg/dL    Calcium 9.6 8.7 - 10.5 mg/dL    Total Protein 8.6 (H) 6.0 - 8.4 g/dL    Albumin 3.4 (L) 3.5 - 5.2 g/dL    Total Bilirubin 0.5 0.1 - 1.0 mg/dL    Alkaline Phosphatase 76 55 - 135 U/L    AST 17 10 - 40 U/L    ALT 18 10 - 44 U/L    Anion Gap 20 (H) 8 - 16 mmol/L    eGFR if African American 4 (A) >60 mL/min/1.73 m^2    eGFR if non African American 3 (A) >60 mL/min/1.73 m^2   Protime-INR   Result Value Ref  Range    Prothrombin Time 32.0 (H) 9.0 - 12.5 sec    INR 3.2 (H) 0.8 - 1.2   APTT   Result Value Ref Range    aPTT 52.3 (H) 21.0 - 32.0 sec   COVID-19 Rapid Screening   Result Value Ref Range    SARS-CoV-2 RNA, Amplification, Qual Negative Negative   CBC with Automated Differential   Result Value Ref Range    WBC 8.53 3.90 - 12.70 K/uL    RBC 2.84 (L) 4.60 - 6.20 M/uL    Hemoglobin 9.9 (L) 14.0 - 18.0 g/dL    Hematocrit 30.0 (L) 40.0 - 54.0 %    Mean Corpuscular Volume 106 (H) 82 - 98 fL    Mean Corpuscular Hemoglobin 34.9 (H) 27.0 - 31.0 pg    Mean Corpuscular Hemoglobin Conc 33.0 32.0 - 36.0 g/dL    RDW 13.1 11.5 - 14.5 %    Platelets 124 (L) 150 - 350 K/uL    MPV 11.0 9.2 - 12.9 fL    Immature Granulocytes 0.4 0.0 - 0.5 %    Gran # (ANC) 7.1 1.8 - 7.7 K/uL    Immature Grans (Abs) 0.03 0.00 - 0.04 K/uL    Lymph # 0.8 (L) 1.0 - 4.8 K/uL    Mono # 0.5 0.3 - 1.0 K/uL    Eos # 0.0 0.0 - 0.5 K/uL    Baso # 0.01 0.00 - 0.20 K/uL    nRBC 0 0 /100 WBC    Gran% 83.6 (H) 38.0 - 73.0 %    Lymph% 9.7 (L) 18.0 - 48.0 %    Mono% 5.7 4.0 - 15.0 %    Eosinophil% 0.5 0.0 - 8.0 %    Basophil% 0.1 0.0 - 1.9 %    Differential Method Automated    Type & Screen   Result Value Ref Range    Group & Rh O POS     Indirect [place] NEG             The Emergency Provider reviewed the vital signs and test results, which are outlined above.     ED Discussion     10:35 AM: Attempted to close wound, but there is a 5 mm area of ulcer.  Tourniquet is present, area was cleaned with Betadine. Attempted to stitch around ulcer to stop bleeding, but stitch came through tissue. Tried to close shunt, lidocaine 1 mL with epi.     10:38 AM:  Vascular  consult placed.    10:48 AM: Dr. Gleason evaluated pt and determined that shunt needs to be replaced.  Dr. Gleason put in stitch.  No bleeding.  Recommends [id number] be placed tomorrow if not emergently needed for dialysis today.    11:23 AM: case was discussed with Dr. Hurst (Nephrology) agrees with the plan  of care.    12:59 PM: Discussed case with Dr. Gerardo ([facility]). Dr. Gerardo agrees with current care and management of pt and accepts admission.   Admitting Service: Hospital Medicine  Admitting Physician: Dr. Gerardo  Admit to: obs med-tele    12:59 PM: Re-evaluated pt. I have discussed test results, shared treatment plan, and the need for admission with patient. Pt expresses understanding at this time and agree with all information. All questions answered. Pt has no further questions or concerns at this time. Pt is ready for admit.         Medical Decision Making:   Clinical Tests:   Lab Tests: Ordered and Reviewed           ED Medication(s):  Medications   amLODIPine tablet 10 mg (10 mg Oral Given 7/1/20 1623)   albuterol-ipratropium 2.5 mg-0.5 mg/3 mL nebulizer solution 3 mL (has no administration in time range)   calcium acetate(phosphat bind) capsule 667 mg (667 mg Oral Given 7/1/20 1437)   hydrALAZINE tablet 100 mg (100 mg Oral Given 7/1/20 1437)   hydrOXYzine pamoate capsule 25 mg (has no administration in time range)   lisinopriL tablet 40 mg (40 mg Oral Given 7/1/20 1437)   pantoprazole EC tablet 40 mg (40 mg Oral Given 7/1/20 1437)   vit b cmplx 3-fa-vit c-biotin 1- mg-mg-mcg per tablet 1 tablet (1 tablet Oral Given 7/1/20 1624)   tenofovir tablet 300 mg (has no administration in time range)   sodium chloride 0.9% flush 10 mL (has no administration in time range)   vancomycin - pharmacy to dose (has no administration in time range)   cefepime in dextrose 5 % IVPB 2 g (2 g Intravenous Given 7/1/20 1437)   vancomycin 2 g in dextrose 5 % 500 mL IVPB (2,000 mg Intravenous New Bag 7/1/20 1623)   lidocaine 2%/EPINEPHrine 1:100,000 injection 1 mL (1 mL Other Given by Other 7/1/20 1028)   fentaNYL injection 50 mcg (50 mcg Intravenous Given 7/1/20 1037)       Current Discharge Medication List                  Scribe Attestation:   Scribe #1: I performed the above scribed service and the  documentation accurately describes the services I performed. I attest to the accuracy of the note.     Attending:   Physician Attestation Statement for Scribe #1: I, Robyn Pittman DO, personally performed the services described in this documentation, as scribed by Gavin Arevalo, in my presence, and it is both accurate and complete.           Clinical Impression       ICD-10-CM ICD-9-CM   1. Bleeding from dialysis shunt, initial encounter  T82.838A 996.73     459.0   2. End stage renal disease  N18.6 585.6       Disposition:   Disposition: Placed in Observation  Condition: Fair         Robyn Pittman DO  07/01/20 1651

## 2020-07-01 NOTE — CONSULTS
[facility] -   Nephrology  Consult Note    Patient Name: Isidro White Jr.  MRN: [id number]  Admission Date: 7/1/2020  Hospital Length of Stay: 0 days  Attending Provider: Nicolás Gerardo MD   Primary Care Physician: Miguel Campo MD  Principal Problem:Arteriovenous graft infection    Inpatient consult to Nephrology  Consult performed by: Gus Hurst MD  Consult ordered by: Robyn Pittman,   Reason for consult: ESRD on HD         Subjective:     HPI:  Isidro White Jr. is a pleasant 50-year-old  man with history of hypertension, pulmonary hypertension, end-stage renal disease on hemodialysis, MWF, [facility], LUE AVG, this graft was originally placed in [place], last few days he has been having some discomfort, patient was seen by vascular surgery here, Dr Gleason, in his office yesterday, he was informed about possible the of infection in the graft, patient was started on antibiotics, today he started bleeding at the dialysis unit, patient was sent to the emergency room, he needed  sutures placed in the emergency room, pressure was applied, later no thrill was noted, according to vascular surgery note most likely he will need a Vas-Cath placement, may be in new long-term access,    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     HD M, W, F    ESRD on dialysis     Hepatitis B     Hyperlipidemia     Hypertension     DANIEL (obstructive sleep apnea) 11/12/2019    Secondary hyperparathyroidism of renal origin     Supraventricular tachycardia     atrial tachycardia    Tachycardia     Valvular regurgitation     AI, TR       Past Surgical History:   Procedure Laterality Date    ASD REPAIR      age 8 [facility]    AV Graft Creation Left  03/2017    [facility]      [facility]     CARDIAC VALVE SURGERY  02/08/2017    22 mm Medtronic AV, 28 mm TV Medtronic annuloplaty ring    COLONOSCOPY N/A 8/27/2019    Procedure: COLONOSCOPY;  Surgeon: Addie John MD;  Location: [facility] ENDO;  Service: Endoscopy;  Laterality: N/A;  dialysis pt *needs K*    ESOPHAGOGASTRODUODENOSCOPY N/A 8/27/2019    Procedure: EGD (ESOPHAGOGASTRODUODENOSCOPY);  Surgeon: Addie John MD;  Location: [facility] ENDO;  Service: Endoscopy;  Laterality: N/A;    RADIOFREQUENCY ABLATION  03/13/2017    Atrial tachycardia       Review of patient's allergies indicates:  No Known Allergies  Current Facility-Administered Medications   Medication Frequency    albuterol-ipratropium 2.5 mg-0.5 mg/3 mL nebulizer solution 3 mL Q6H PRN    amLODIPine tablet 10 mg Daily    calcium acetate(phosphat bind) capsule 667 mg TID    cefepime in dextrose 5 % IVPB 2 g Q24H    hydrALAZINE tablet 100 mg Q8H    hydrOXYzine pamoate capsule 25 mg Q8H PRN    lisinopriL tablet 40 mg Daily    pantoprazole EC tablet 40 mg Daily    sodium chloride 0.9% flush 10 mL PRN    tenofovir tablet 300 mg Weekly    vancomycin - pharmacy to dose pharmacy to manage frequency    vancomycin 2 g in dextrose 5 % 500 mL IVPB Once    vit b cmplx 3-fa-vit c-biotin 1- mg-mg-mcg per tablet 1 tablet Daily     Facility-Administered Medications Ordered in Other Encounters   Medication Frequency    0.9%  NaCl infusion Continuous    sodium chloride 0.9% flush 10 mL PRN     Family History     Problem Relation (Age of Onset)    Anesthesia problems Paternal Uncle    Diabetes Paternal Aunt, Paternal Aunt    Heart attack Father    Heart failure Paternal Grandmother    Hypertension Paternal Aunt    Leukemia Mother, Paternal Aunt    No Known Problems Sister, Brother, Sister, Sister    Stroke Paternal Aunt    Suicide Paternal Uncle    Valvular heart disease Maternal Aunt        Tobacco Use    Smoking status:  Never Smoker    Smokeless tobacco: Never Used   Substance and Sexual Activity    Alcohol use: No     Frequency: Never     Comment: quit in 2016; does have heavy drinking history; started drinking at age 13; was drinking a 12 pack over the weekend and two 24 ounces of beer a day during the week along with a pint of hard alcohol    Drug use: No    Sexual activity: Not on file     Review of Systems   Constitutional: Positive for activity change. Negative for appetite change.   HENT: Negative for congestion and facial swelling.    Eyes: Negative for pain, discharge and redness.   Respiratory: Negative for apnea, cough and chest tightness.    Cardiovascular: Negative for chest pain, palpitations and leg swelling.   Gastrointestinal: Negative for abdominal distention.   Genitourinary: Negative for difficulty urinating, dysuria and frequency.   Musculoskeletal: Positive for arthralgias. Negative for neck pain and neck stiffness.   Skin: Negative for color change, rash and wound.   Neurological: Negative for dizziness, weakness and numbness.   Psychiatric/Behavioral: Negative for sleep disturbance.   All other systems reviewed and are negative.    Objective:     Vital Signs (Most Recent):  Temp: 97.7 °F (36.5 °C) (07/01/20 1614)  Pulse: 82 (07/01/20 1614)  Resp: 16 (07/01/20 1614)  BP: (!) 170/87 (07/01/20 1614)  SpO2: 98 % (07/01/20 1614)  O2 Device (Oxygen Therapy): room air (07/01/20 1501) Vital Signs (24h Range):  Temp:  [97.5 °F (36.4 °C)-97.7 °F (36.5 °C)] 97.7 °F (36.5 °C)  Pulse:  [] 82  Resp:  [14-31] 16  SpO2:  [95 %-99 %] 98 %  BP: (133-191)/(69-93) 170/87     Weight: 103 kg (227 lb 1.2 oz) (07/01/20 1436)  Body mass index is 29.15 kg/m².  Body surface area is 2.32 meters squared.    No intake/output data recorded.    Physical Exam  Vitals signs and nursing note reviewed.   Constitutional:       General: He is not in acute distress.     Appearance: He is well-developed. He is not diaphoretic.   HENT:       Head: Normocephalic and atraumatic.   Eyes:      Pupils: Pupils are equal, round, and reactive to light.   Neck:      Musculoskeletal: Normal range of motion and neck supple.      Thyroid: No thyromegaly.      Trachea: No tracheal deviation.   Cardiovascular:      Rate and Rhythm: Normal rate and regular rhythm.      Heart sounds: Normal heart sounds. No murmur. No friction rub. No gallop.    Pulmonary:      Effort: Pulmonary effort is normal.      Breath sounds: Rales present. No wheezing.   Abdominal:      Palpations: Abdomen is soft. There is no mass.      Tenderness: There is no abdominal tenderness. There is no guarding or rebound.   Musculoskeletal: Normal range of motion.      Comments: Left arm dressings in place    Lymphadenopathy:      Cervical: No cervical adenopathy.   Skin:     General: Skin is warm.      Findings: No erythema or rash.   Neurological:      Mental Status: He is alert and oriented to person, place, and time.         Significant Labs:  CBC:   Recent Labs   Lab 07/01/20  1433   WBC 8.53   RBC 2.84*   HGB 9.9*   HCT 30.0*   *   *   MCH 34.9*   MCHC 33.0     CMP:   Recent Labs   Lab 07/01/20  1030   *   CALCIUM 9.6   ALBUMIN 3.4*   PROT 8.6*      K 4.6   CO2 24   CL 95   BUN 72*   CREATININE 16.2*   ALKPHOS 76   ALT 18   AST 17   BILITOT 0.5     Coagulation:   Recent Labs   Lab 07/01/20  1030   INR 3.2*   APTT 52.3*     LFTs:   Recent Labs   Lab 07/01/20  1030   ALT 18   AST 17   ALKPHOS 76   BILITOT 0.5   PROT 8.6*   ALBUMIN 3.4*     All labs within the past 24 hours have been reviewed.    Significant Imaging:  Reviewed    Lab Results   Component Value Date    .0 (H) 01/08/2019    CALCIUM 9.6 07/01/2020    PHOS 4.4 05/01/2019       Lab Results   Component Value Date    ALBUMIN 3.4 (L) 07/01/2020         Assessment/Plan:     Active Diagnoses:    Diagnosis Date Noted POA    PRINCIPAL PROBLEM:  Arteriovenous graft infection [T82.7XXA] 07/01/2020 Yes     Atrial fibrillation [I48.91] 03/27/2019 Yes    ESRD (end stage renal disease) on dialysis [N18.6, Z99.2]  Not Applicable     Chronic    Essential hypertension [I10]  Yes      Problems Resolved During this Admission:     1. ESRD on HD, MWF, [facility], LIS AVG , patient was sent to the emergency room has he started bleeding at the dialysis unit today, most likely infected graft, started on IV vancomycin, blood cultures ordered and pending at this time, according to vascular surgery he will likely need a Vas-Cath tomorrow, will plan dialysis after catheter placement tomorrow,    2.  Hypertension, resume home blood pressure medications,    3.  Anemia, multifactorial, monitor hemoglobin, Procrit with dialysis, PRBC transfusion when indicated,    4.  Secondary hyperparathyroidism, continue renal diet, PhosLo,    5.  Volume, continue fluid restriction of about 1200 mL per day, low-salt diet,    6.  Possible infected left arm graft, per vascular surgery, likely Vas-Cath placement tomorrow,      Thank you for your consult. I will follow-up with patient. Please contact us if you have any additional questions.       Total time spent 70 minutes including time needed to review the records,  patient  evaluation, documentation, face-to-face discussion with the patient,  primary team,   more than 50% of the time was spent on coordination of care and counseling.       Gus Hurst MD  Nephrology  [facility]

## 2020-07-02 ENCOUNTER — OUTPATIENT CASE MANAGEMENT (OUTPATIENT)
Dept: ADMINISTRATIVE | Facility: OTHER | Age: 50
End: 2020-07-02

## 2020-07-02 ENCOUNTER — ANESTHESIA EVENT (OUTPATIENT)
Dept: SURGERY | Facility: HOSPITAL | Age: 50
DRG: 264 | End: 2020-07-02
Payer: MEDICARE

## 2020-07-02 ENCOUNTER — ANESTHESIA (OUTPATIENT)
Dept: SURGERY | Facility: HOSPITAL | Age: 50
DRG: 264 | End: 2020-07-02
Payer: MEDICARE

## 2020-07-02 PROBLEM — I47.29 NSVT (NONSUSTAINED VENTRICULAR TACHYCARDIA): Status: ACTIVE | Noted: 2020-07-02

## 2020-07-02 PROBLEM — T82.838A BLEEDING FROM DIALYSIS SHUNT: Status: ACTIVE | Noted: 2020-07-02

## 2020-07-02 LAB
ANION GAP SERPL CALC-SCNC: 23 MMOL/L (ref 8–16)
BASOPHILS # BLD AUTO: 0.03 K/UL (ref 0–0.2)
BASOPHILS NFR BLD: 0.4 % (ref 0–1.9)
BUN SERPL-MCNC: 86 MG/DL (ref 6–20)
CALCIUM SERPL-MCNC: 9.6 MG/DL (ref 8.7–10.5)
CHLORIDE SERPL-SCNC: 96 MMOL/L (ref 95–110)
CO2 SERPL-SCNC: 17 MMOL/L (ref 23–29)
CREAT SERPL-MCNC: 17.8 MG/DL (ref 0.5–1.4)
DIFFERENTIAL METHOD: ABNORMAL
EOSINOPHIL # BLD AUTO: 0.2 K/UL (ref 0–0.5)
EOSINOPHIL NFR BLD: 2.8 % (ref 0–8)
ERYTHROCYTE [DISTWIDTH] IN BLOOD BY AUTOMATED COUNT: 13.1 % (ref 11.5–14.5)
EST. GFR  (AFRICAN AMERICAN): 3 ML/MIN/1.73 M^2
EST. GFR  (NON AFRICAN AMERICAN): 3 ML/MIN/1.73 M^2
GLUCOSE SERPL-MCNC: 87 MG/DL (ref 70–110)
HCT VFR BLD AUTO: 33.2 % (ref 40–54)
HGB BLD-MCNC: 10.5 G/DL (ref 14–18)
IMM GRANULOCYTES # BLD AUTO: 0.02 K/UL (ref 0–0.04)
IMM GRANULOCYTES NFR BLD AUTO: 0.2 % (ref 0–0.5)
INR PPP: 3.3 (ref 0.8–1.2)
LYMPHOCYTES # BLD AUTO: 1.3 K/UL (ref 1–4.8)
LYMPHOCYTES NFR BLD: 15 % (ref 18–48)
MCH RBC QN AUTO: 34.9 PG (ref 27–31)
MCHC RBC AUTO-ENTMCNC: 31.6 G/DL (ref 32–36)
MCV RBC AUTO: 110 FL (ref 82–98)
MONOCYTES # BLD AUTO: 0.8 K/UL (ref 0.3–1)
MONOCYTES NFR BLD: 9.1 % (ref 4–15)
NEUTROPHILS # BLD AUTO: 6.1 K/UL (ref 1.8–7.7)
NEUTROPHILS NFR BLD: 72.5 % (ref 38–73)
NRBC BLD-RTO: 0 /100 WBC
PLATELET # BLD AUTO: 113 K/UL (ref 150–350)
PMV BLD AUTO: 11.9 FL (ref 9.2–12.9)
POTASSIUM SERPL-SCNC: 5.6 MMOL/L (ref 3.5–5.1)
PROTHROMBIN TIME: 33.1 SEC (ref 9–12.5)
RBC # BLD AUTO: 3.01 M/UL (ref 4.6–6.2)
SODIUM SERPL-SCNC: 136 MMOL/L (ref 136–145)
WBC # BLD AUTO: 8.46 K/UL (ref 3.9–12.7)

## 2020-07-02 PROCEDURE — 96376 TX/PRO/DX INJ SAME DRUG ADON: CPT

## 2020-07-02 PROCEDURE — 99233 SBSQ HOSP IP/OBS HIGH 50: CPT | Mod: ,,, | Performed by: INTERNAL MEDICINE

## 2020-07-02 PROCEDURE — 87176 TISSUE HOMOGENIZATION CULTR: CPT

## 2020-07-02 PROCEDURE — 71000033 HC RECOVERY, INTIAL HOUR: Performed by: SURGERY

## 2020-07-02 PROCEDURE — 63600175 PHARM REV CODE 636 W HCPCS: Performed by: SURGERY

## 2020-07-02 PROCEDURE — 37000008 HC ANESTHESIA 1ST 15 MINUTES: Performed by: SURGERY

## 2020-07-02 PROCEDURE — 99233 PR SUBSEQUENT HOSPITAL CARE,LEVL III: ICD-10-PCS | Mod: ,,, | Performed by: INTERNAL MEDICINE

## 2020-07-02 PROCEDURE — 11000001 HC ACUTE MED/SURG PRIVATE ROOM

## 2020-07-02 PROCEDURE — 63600175 PHARM REV CODE 636 W HCPCS: Performed by: NURSE PRACTITIONER

## 2020-07-02 PROCEDURE — 85610 PROTHROMBIN TIME: CPT

## 2020-07-02 PROCEDURE — 21400001 HC TELEMETRY ROOM

## 2020-07-02 PROCEDURE — 87186 SC STD MICRODIL/AGAR DIL: CPT

## 2020-07-02 PROCEDURE — 99222 PR INITIAL HOSPITAL CARE,LEVL II: ICD-10-PCS | Mod: ,,, | Performed by: INTERNAL MEDICINE

## 2020-07-02 PROCEDURE — 63600175 PHARM REV CODE 636 W HCPCS: Performed by: ANESTHESIOLOGY

## 2020-07-02 PROCEDURE — 87077 CULTURE AEROBIC IDENTIFY: CPT

## 2020-07-02 PROCEDURE — 36415 COLL VENOUS BLD VENIPUNCTURE: CPT

## 2020-07-02 PROCEDURE — C1750 CATH, HEMODIALYSIS,LONG-TERM: HCPCS | Performed by: SURGERY

## 2020-07-02 PROCEDURE — 63600175 PHARM REV CODE 636 W HCPCS: Performed by: NURSE ANESTHETIST, CERTIFIED REGISTERED

## 2020-07-02 PROCEDURE — 25000003 PHARM REV CODE 250: Performed by: SURGERY

## 2020-07-02 PROCEDURE — 25000003 PHARM REV CODE 250: Performed by: NURSE ANESTHETIST, CERTIFIED REGISTERED

## 2020-07-02 PROCEDURE — 25000003 PHARM REV CODE 250: Performed by: NURSE PRACTITIONER

## 2020-07-02 PROCEDURE — 99222 1ST HOSP IP/OBS MODERATE 55: CPT | Mod: ,,, | Performed by: INTERNAL MEDICINE

## 2020-07-02 PROCEDURE — 25000003 PHARM REV CODE 250: Performed by: INTERNAL MEDICINE

## 2020-07-02 PROCEDURE — 36000707: Performed by: SURGERY

## 2020-07-02 PROCEDURE — 90935 HEMODIALYSIS ONE EVALUATION: CPT

## 2020-07-02 PROCEDURE — 80048 BASIC METABOLIC PNL TOTAL CA: CPT

## 2020-07-02 PROCEDURE — 37000009 HC ANESTHESIA EA ADD 15 MINS: Performed by: SURGERY

## 2020-07-02 PROCEDURE — 87070 CULTURE OTHR SPECIMN AEROBIC: CPT

## 2020-07-02 PROCEDURE — 71000039 HC RECOVERY, EACH ADD'L HOUR: Performed by: SURGERY

## 2020-07-02 PROCEDURE — 85025 COMPLETE CBC W/AUTO DIFF WBC: CPT

## 2020-07-02 PROCEDURE — 36000706: Performed by: SURGERY

## 2020-07-02 DEVICE — CATH HEMO-FLOW 14.5F X 24CM: Type: IMPLANTABLE DEVICE | Site: NECK | Status: FUNCTIONAL

## 2020-07-02 RX ORDER — LABETALOL 100 MG/1
100 TABLET, FILM COATED ORAL 3 TIMES DAILY PRN
Status: DISCONTINUED | OUTPATIENT
Start: 2020-07-02 | End: 2020-07-02

## 2020-07-02 RX ORDER — SUCCINYLCHOLINE CHLORIDE 20 MG/ML
INJECTION INTRAMUSCULAR; INTRAVENOUS
Status: DISCONTINUED | OUTPATIENT
Start: 2020-07-02 | End: 2020-07-02

## 2020-07-02 RX ORDER — CEFAZOLIN SODIUM 1 G/3ML
INJECTION, POWDER, FOR SOLUTION INTRAMUSCULAR; INTRAVENOUS
Status: DISCONTINUED | OUTPATIENT
Start: 2020-07-02 | End: 2020-07-02

## 2020-07-02 RX ORDER — WARFARIN SODIUM 5 MG/1
15 TABLET ORAL
Status: DISCONTINUED | OUTPATIENT
Start: 2020-07-02 | End: 2020-07-06 | Stop reason: HOSPADM

## 2020-07-02 RX ORDER — HYDROMORPHONE HYDROCHLORIDE 1 MG/ML
0.5 INJECTION, SOLUTION INTRAMUSCULAR; INTRAVENOUS; SUBCUTANEOUS EVERY 4 HOURS PRN
Status: DISCONTINUED | OUTPATIENT
Start: 2020-07-02 | End: 2020-07-06 | Stop reason: HOSPADM

## 2020-07-02 RX ORDER — GLYCOPYRROLATE 0.2 MG/ML
INJECTION INTRAMUSCULAR; INTRAVENOUS
Status: DISCONTINUED | OUTPATIENT
Start: 2020-07-02 | End: 2020-07-02

## 2020-07-02 RX ORDER — LIDOCAINE HYDROCHLORIDE 10 MG/ML
INJECTION, SOLUTION EPIDURAL; INFILTRATION; INTRACAUDAL; PERINEURAL
Status: DISCONTINUED | OUTPATIENT
Start: 2020-07-02 | End: 2020-07-02

## 2020-07-02 RX ORDER — HEPARIN SODIUM 1000 [USP'U]/ML
INJECTION, SOLUTION INTRAVENOUS; SUBCUTANEOUS
Status: DISCONTINUED | OUTPATIENT
Start: 2020-07-02 | End: 2020-07-02 | Stop reason: HOSPADM

## 2020-07-02 RX ORDER — CEFAZOLIN 1 G/1
INJECTION, POWDER, FOR SOLUTION INTRAVENOUS
Status: DISCONTINUED | OUTPATIENT
Start: 2020-07-02 | End: 2020-07-02 | Stop reason: HOSPADM

## 2020-07-02 RX ORDER — HYDROCODONE BITARTRATE AND ACETAMINOPHEN 5; 325 MG/1; MG/1
1 TABLET ORAL EVERY 4 HOURS PRN
Status: DISCONTINUED | OUTPATIENT
Start: 2020-07-02 | End: 2020-07-06 | Stop reason: HOSPADM

## 2020-07-02 RX ORDER — ONDANSETRON 2 MG/ML
4 INJECTION INTRAMUSCULAR; INTRAVENOUS EVERY 12 HOURS PRN
Status: DISCONTINUED | OUTPATIENT
Start: 2020-07-02 | End: 2020-07-06 | Stop reason: HOSPADM

## 2020-07-02 RX ORDER — PROPOFOL 10 MG/ML
VIAL (ML) INTRAVENOUS
Status: DISCONTINUED | OUTPATIENT
Start: 2020-07-02 | End: 2020-07-02

## 2020-07-02 RX ORDER — DEXAMETHASONE SODIUM PHOSPHATE 4 MG/ML
INJECTION, SOLUTION INTRA-ARTICULAR; INTRALESIONAL; INTRAMUSCULAR; INTRAVENOUS; SOFT TISSUE
Status: DISCONTINUED | OUTPATIENT
Start: 2020-07-02 | End: 2020-07-02

## 2020-07-02 RX ORDER — WARFARIN SODIUM 5 MG/1
15 TABLET ORAL
Status: DISCONTINUED | OUTPATIENT
Start: 2020-07-04 | End: 2020-07-04

## 2020-07-02 RX ORDER — ONDANSETRON 2 MG/ML
INJECTION INTRAMUSCULAR; INTRAVENOUS
Status: DISCONTINUED | OUTPATIENT
Start: 2020-07-02 | End: 2020-07-02

## 2020-07-02 RX ORDER — LABETALOL 100 MG/1
100 TABLET, FILM COATED ORAL EVERY 12 HOURS
Status: DISCONTINUED | OUTPATIENT
Start: 2020-07-02 | End: 2020-07-03

## 2020-07-02 RX ORDER — HYDROMORPHONE HYDROCHLORIDE 2 MG/ML
0.2 INJECTION, SOLUTION INTRAMUSCULAR; INTRAVENOUS; SUBCUTANEOUS EVERY 5 MIN PRN
Status: DISCONTINUED | OUTPATIENT
Start: 2020-07-02 | End: 2020-07-02

## 2020-07-02 RX ORDER — ROCURONIUM BROMIDE 10 MG/ML
INJECTION, SOLUTION INTRAVENOUS
Status: DISCONTINUED | OUTPATIENT
Start: 2020-07-02 | End: 2020-07-02

## 2020-07-02 RX ORDER — NEOSTIGMINE METHYLSULFATE 1 MG/ML
INJECTION, SOLUTION INTRAVENOUS
Status: DISCONTINUED | OUTPATIENT
Start: 2020-07-02 | End: 2020-07-02

## 2020-07-02 RX ORDER — MIDAZOLAM HYDROCHLORIDE 1 MG/ML
INJECTION, SOLUTION INTRAMUSCULAR; INTRAVENOUS
Status: DISCONTINUED | OUTPATIENT
Start: 2020-07-02 | End: 2020-07-02

## 2020-07-02 RX ORDER — MORPHINE SULFATE 4 MG/ML
6 INJECTION, SOLUTION INTRAMUSCULAR; INTRAVENOUS EVERY 4 HOURS PRN
Status: DISCONTINUED | OUTPATIENT
Start: 2020-07-02 | End: 2020-07-06 | Stop reason: HOSPADM

## 2020-07-02 RX ORDER — FENTANYL CITRATE 50 UG/ML
INJECTION, SOLUTION INTRAMUSCULAR; INTRAVENOUS
Status: DISCONTINUED | OUTPATIENT
Start: 2020-07-02 | End: 2020-07-02

## 2020-07-02 RX ORDER — SODIUM CHLORIDE, SODIUM LACTATE, POTASSIUM CHLORIDE, CALCIUM CHLORIDE 600; 310; 30; 20 MG/100ML; MG/100ML; MG/100ML; MG/100ML
INJECTION, SOLUTION INTRAVENOUS CONTINUOUS PRN
Status: DISCONTINUED | OUTPATIENT
Start: 2020-07-02 | End: 2020-07-02

## 2020-07-02 RX ORDER — DILTIAZEM HYDROCHLORIDE 180 MG/1
360 CAPSULE, COATED, EXTENDED RELEASE ORAL DAILY
Status: DISCONTINUED | OUTPATIENT
Start: 2020-07-02 | End: 2020-07-06 | Stop reason: HOSPADM

## 2020-07-02 RX ORDER — OXYCODONE AND ACETAMINOPHEN 5; 325 MG/1; MG/1
1 TABLET ORAL
Status: DISCONTINUED | OUTPATIENT
Start: 2020-07-02 | End: 2020-07-02

## 2020-07-02 RX ADMIN — PANTOPRAZOLE SODIUM 40 MG: 40 TABLET, DELAYED RELEASE ORAL at 09:07

## 2020-07-02 RX ADMIN — AMLODIPINE BESYLATE 10 MG: 10 TABLET ORAL at 09:07

## 2020-07-02 RX ADMIN — CEFEPIME HYDROCHLORIDE 2 G: 2 INJECTION, SOLUTION INTRAVENOUS at 02:07

## 2020-07-02 RX ADMIN — ONDANSETRON 4 MG: 2 INJECTION, SOLUTION INTRAMUSCULAR; INTRAVENOUS at 02:07

## 2020-07-02 RX ADMIN — ROCURONIUM BROMIDE 15 MG: 10 INJECTION, SOLUTION INTRAVENOUS at 02:07

## 2020-07-02 RX ADMIN — NEOSTIGMINE METHYLSULFATE 4 MG: 1 INJECTION INTRAVENOUS at 04:07

## 2020-07-02 RX ADMIN — HYDROMORPHONE HYDROCHLORIDE 0.2 MG: 2 INJECTION INTRAMUSCULAR; INTRAVENOUS; SUBCUTANEOUS at 04:07

## 2020-07-02 RX ADMIN — HYDROMORPHONE HYDROCHLORIDE 0.5 MG: 1 INJECTION, SOLUTION INTRAMUSCULAR; INTRAVENOUS; SUBCUTANEOUS at 09:07

## 2020-07-02 RX ADMIN — CALCIUM ACETATE 667 MG: 667 CAPSULE ORAL at 10:07

## 2020-07-02 RX ADMIN — MIDAZOLAM 2 MG: 1 INJECTION INTRAMUSCULAR; INTRAVENOUS at 02:07

## 2020-07-02 RX ADMIN — HYDROCODONE BITARTRATE AND ACETAMINOPHEN 1 TABLET: 5; 325 TABLET ORAL at 10:07

## 2020-07-02 RX ADMIN — ROBINUL 0.6 MG: 0.2 INJECTION INTRAMUSCULAR; INTRAVENOUS at 04:07

## 2020-07-02 RX ADMIN — HYDRALAZINE HYDROCHLORIDE 100 MG: 50 TABLET, FILM COATED ORAL at 01:07

## 2020-07-02 RX ADMIN — HYDROMORPHONE HYDROCHLORIDE 0.2 MG: 2 INJECTION INTRAMUSCULAR; INTRAVENOUS; SUBCUTANEOUS at 05:07

## 2020-07-02 RX ADMIN — LISINOPRIL 40 MG: 20 TABLET ORAL at 09:07

## 2020-07-02 RX ADMIN — LIDOCAINE HYDROCHLORIDE 50 MG: 10 INJECTION, SOLUTION EPIDURAL; INFILTRATION; INTRACAUDAL; PERINEURAL at 02:07

## 2020-07-02 RX ADMIN — SUCCINYLCHOLINE CHLORIDE 100 MG: 20 INJECTION, SOLUTION INTRAMUSCULAR; INTRAVENOUS at 02:07

## 2020-07-02 RX ADMIN — PROPOFOL 100 MG: 10 INJECTION, EMULSION INTRAVENOUS at 03:07

## 2020-07-02 RX ADMIN — ROCURONIUM BROMIDE 10 MG: 10 INJECTION, SOLUTION INTRAVENOUS at 02:07

## 2020-07-02 RX ADMIN — HYDRALAZINE HYDROCHLORIDE 100 MG: 50 TABLET, FILM COATED ORAL at 05:07

## 2020-07-02 RX ADMIN — FENTANYL CITRATE 50 MCG: 50 INJECTION, SOLUTION INTRAMUSCULAR; INTRAVENOUS at 03:07

## 2020-07-02 RX ADMIN — B-COMPLEX W/ C & FOLIC ACID TAB 1 MG 1 TABLET: 1 TAB at 09:07

## 2020-07-02 RX ADMIN — SODIUM CHLORIDE, SODIUM LACTATE, POTASSIUM CHLORIDE, AND CALCIUM CHLORIDE: .6; .31; .03; .02 INJECTION, SOLUTION INTRAVENOUS at 02:07

## 2020-07-02 RX ADMIN — LABETALOL HYDROCHLORIDE 100 MG: 100 TABLET, FILM COATED ORAL at 09:07

## 2020-07-02 RX ADMIN — FENTANYL CITRATE 50 MCG: 50 INJECTION, SOLUTION INTRAMUSCULAR; INTRAVENOUS at 02:07

## 2020-07-02 RX ADMIN — DEXAMETHASONE SODIUM PHOSPHATE 4 MG: 4 INJECTION, SOLUTION INTRA-ARTICULAR; INTRALESIONAL; INTRAMUSCULAR; INTRAVENOUS; SOFT TISSUE at 02:07

## 2020-07-02 RX ADMIN — DILTIAZEM HYDROCHLORIDE 360 MG: 180 CAPSULE, COATED, EXTENDED RELEASE ORAL at 06:07

## 2020-07-02 RX ADMIN — CEFAZOLIN 2 G: 1 INJECTION, POWDER, FOR SOLUTION INTRAMUSCULAR; INTRAVENOUS at 03:07

## 2020-07-02 RX ADMIN — PROPOFOL 150 MG: 10 INJECTION, EMULSION INTRAVENOUS at 02:07

## 2020-07-02 RX ADMIN — HYDROCODONE BITARTRATE AND ACETAMINOPHEN 1 TABLET: 5; 325 TABLET ORAL at 06:07

## 2020-07-02 RX ADMIN — HYDRALAZINE HYDROCHLORIDE 100 MG: 50 TABLET, FILM COATED ORAL at 09:07

## 2020-07-02 NOTE — CONSULTS
[facility]  Cardiology  Consult Note    Patient Name: Isidro White Jr.  MRN: [id number]  Admission Date: 7/1/2020  Hospital Length of Stay: 0 days  Code Status: Full Code   Attending Provider: Nicolás Gerardo MD   Consulting Provider: Radha Slaughter PA-C  Primary Care Physician: Miguel Campo MD (Inactive)  Principal Problem:Arteriovenous graft infection    Patient information was obtained from patient, past medical records and ER records.     Inpatient consult to Cardiology  Consult performed by: Radha Slaughter PA-C  Consult ordered by: Lucretia Parker NP        Subjective:     Chief Complaint:  NSVT    HPI:   Mr. White is a 50 year old male aptient whose current medical conditions include ASD (s/p closure), HFpEF, s/p AVR (mechanical) and TV annuloplasty), HTN, PHTN, SVT (s/p RFA 3/17), and ESRD on HD who presented to [facility] ED yesterday with a chief complaint of bleeding from his L arm shunt. Associated symptoms included dizziness and diaphoresis. Patient denied any associated iesha chest pain, fever, chills, palpitations, near syncope, or syncope. While in ED, patient continued to have active bleeding which required suture placement and compression dressing. Initial workup revealed H/H of 10.8/32.0 and patient was subsequently admitted for further evaluation and treatment. Overnight, patient reportedly had runs of NSVT and cardiology consulted to assist with management. Patient seen and examined today, F. Remains chest pain free. He reports compliance with his medications. On long term AC (Coumadin) due to history of mechanical AVR. Chart reviewed. H/H has remained stable. INR 3.2 yesterday. Echo 3/20 showed normal EF. Stress 3/20 negative for ischemia. Vascular surgery on board and planning on access removal and Vas-cath placement.      Reviewed telemetry strips---NSVT confirmed.    Past Medical History:   Diagnosis Date    Aortic valve stenosis     s/p mechanical AVR     Atrial fibrillation     Atrial flutter     Cardiomyopathy     CHF (congestive heart failure)     Drug-induced erectile dysfunction     ESRD due to hypertension     HD M, W, F    ESRD on dialysis     Hepatitis B     Hyperlipidemia     Hypertension     DANIEL (obstructive sleep apnea) 11/12/2019    Secondary hyperparathyroidism of renal origin     Supraventricular tachycardia     atrial tachycardia    Tachycardia     Valvular regurgitation     AI, TR       Past Surgical History:   Procedure Laterality Date    ASD REPAIR      age 8 [facility]    AV Graft Creation Left 03/2017    [facility]     CARDIAC VALVE SURGERY  02/08/2017    22 mm Medtronic AV, 28 mm TV Medtronic annuloplaty ring    COLONOSCOPY N/A 8/27/2019    Procedure: COLONOSCOPY;  Surgeon: Addie John MD;  Location: [facility] ENDO;  Service: Endoscopy;  Laterality: N/A;  dialysis pt *needs K*    ESOPHAGOGASTRODUODENOSCOPY N/A 8/27/2019    Procedure: EGD (ESOPHAGOGASTRODUODENOSCOPY);  Surgeon: Addie John MD;  Location: [facility] ENDO;  Service: Endoscopy;  Laterality: N/A;    RADIOFREQUENCY ABLATION  03/13/2017    Atrial tachycardia       Review of patient's allergies indicates:  No Known Allergies    Current Facility-Administered Medications on File Prior to Encounter   Medication    0.9%  NaCl infusion    sodium chloride 0.9% flush 10 mL     Current Outpatient Medications on File Prior to Encounter   Medication Sig    albuterol (VENTOLIN HFA) 90 mcg/actuation inhaler Inhale 2 puffs into the lungs every 6 (six) hours as needed for Wheezing or Shortness of Breath. Rescue    amLODIPine (NORVASC) 10 MG tablet TAKE ONE TABLET BY MOUTH EVERY DAY    amoxicillin (AMOXIL) 500 MG capsule Take 1 capsule (500 mg total) by mouth 3 (three) times daily x 14 days    ascorbic acid, vitamin C, (VITAMIN C) 500 MG tablet Take 1 tablet (500 mg total) by mouth 2 (two) times daily.    b complex vitamins tablet Take  1 tablet by mouth once daily.    calcium acetate (PHOSLO) 667 mg capsule Take 1 capsule (667 mg total) by mouth 3 (three) times daily.    diltiaZEM (CARDIZEM CD) 360 MG 24 hr capsule Take 1 capsule (360 mg total) by mouth once daily.    enoxaparin (LOVENOX) 100 mg/mL Syrg Inject 0.9 mLs (90 mg total) into the skin once daily.    epoetin vikki (PROCRIT) 10,000 unit/mL injection Inject 1 mL (10,000 Units total) into the skin every Mon, Wed, Fri. To be given with HD    fish oil-omega-3 fatty acids 300-1,000 mg capsule Take 2 g by mouth once daily.    hydrALAZINE (APRESOLINE) 100 MG tablet TAKE ONE TABLET BY MOUTH THREE TIMES DAILY INCLUDING DIALYSIS DAYS    hydrOXYzine pamoate (VISTARIL) 25 MG Cap T1C PO QD PRN ITCHING    labetalol (NORMODYNE) 100 MG tablet TAKE ONE TABLET BY MOUTH THREE TIMES DAILY AS NEEDED FOR HEART RATE > 120    lisinopril (PRINIVIL,ZESTRIL) 40 MG tablet TAKE ONE TABLET BY MOUTH EVERY DAY    multivitamin with minerals tablet Take 1 tablet by mouth once daily.    omega-3 fatty acids-vitamin E (FISH OIL) 1,000 mg Cap Take 1 capsule by mouth once daily.    pantoprazole (PROTONIX) 40 MG tablet TAKE ONE TABLET BY MOUTH EVERY DAY    ETHAN-RACQUEL RX 1- mg-mg-mcg Tab TAKE ONE TABLET BY MOUTH EVERY DAY    tenofovir (VIREAD) 300 mg Tab Take 1 tablet (300 mg total) by mouth once a week.    vitamin D (VITAMIN D3) 1000 units Tab Take 1,000 Units by mouth 3 (three) times a week.    warfarin (COUMADIN) 7.5 MG tablet Take 2 tablets by mouth on Thursdays and 1 tablet on all other days of the week. (Patient taking differently: Take 2 tablets by mouth on Thursdays and Saturday; and 1 tablet on all other days of the week.)     Family History     Problem Relation (Age of Onset)    Anesthesia problems Paternal Uncle    Diabetes Paternal Aunt, Paternal Aunt    Heart attack Father    Heart failure Paternal Grandmother    Hypertension Paternal Aunt    Leukemia Mother, Paternal Aunt    No Known Problems  Sister, Brother, Sister, Sister    Stroke Paternal Aunt    Suicide Paternal Uncle    Valvular heart disease Maternal Aunt        Tobacco Use    Smoking status: Never Smoker    Smokeless tobacco: Never Used   Substance and Sexual Activity    Alcohol use: No     Frequency: Never     Comment: quit in 2016; does have heavy drinking history; started drinking at age 13; was drinking a 12 pack over the weekend and two 24 ounces of beer a day during the week along with a pint of hard alcohol    Drug use: No    Sexual activity: Not on file     Review of Systems   Constitution: Positive for malaise/fatigue.   HENT: Negative.    Eyes: Negative.    Cardiovascular: Negative.    Respiratory: Negative.    Endocrine: Negative.    Hematologic/Lymphatic: Bruises/bleeds easily.   Skin: Negative.    Musculoskeletal: Positive for joint swelling (left arm).   Gastrointestinal: Negative.    Genitourinary: Negative.    Neurological: Negative.    Psychiatric/Behavioral: Negative.    Allergic/Immunologic: Negative.      Objective:     Vital Signs (Most Recent):  Temp: 97.8 °F (36.6 °C) (07/02/20 0705)  Pulse: 91 (07/02/20 0705)  Resp: 18 (07/02/20 0705)  BP: (!) 150/88 (07/02/20 0705)  SpO2: (!) 94 % (07/02/20 0705) Vital Signs (24h Range):  Temp:  [97.5 °F (36.4 °C)-98.7 °F (37.1 °C)] 97.8 °F (36.6 °C)  Pulse:  [] 91  Resp:  [14-31] 18  SpO2:  [93 %-99 %] 94 %  BP: (133-191)/(69-93) 150/88     Weight: 103 kg (227 lb 1.2 oz)  Body mass index is 29.15 kg/m².    SpO2: (!) 94 %  O2 Device (Oxygen Therapy): room air      Intake/Output Summary (Last 24 hours) at 7/2/2020 0830  Last data filed at 7/2/2020 0301  Gross per 24 hour   Intake 130 ml   Output --   Net 130 ml       Lines/Drains/Airways     Drain                 Hemodialysis AV Graft 03/16/17 0859 Left upper arm 1203 days          Airway                 Airway - Non-Surgical 08/27/19 1341 309 days         Airway - Non-Surgical 08/27/19 1341 Nasal Cannula 309 days           Peripheral Intravenous Line                 Peripheral IV - Single Lumen 07/01/20 18 G Right Antecubital 1 day                Physical Exam   Constitutional: He is oriented to person, place, and time. He appears well-developed and well-nourished. No distress.   HENT:   Head: Normocephalic and atraumatic.   Eyes: Pupils are equal, round, and reactive to light. Right eye exhibits no discharge. Left eye exhibits no discharge.   Neck: Neck supple. No JVD present.   Cardiovascular: Normal rate, regular rhythm, S1 normal and S2 normal.   Murmur heard.  +mechanical click   Pulmonary/Chest: Effort normal and breath sounds normal. No respiratory distress. He has no wheezes. He has no rales.   Abdominal: Soft. He exhibits no distension.   Musculoskeletal:         General: No edema.   Neurological: He is alert and oriented to person, place, and time.   Skin: Skin is warm and dry. He is not diaphoretic. No erythema.   Left arm fistula wrapped/dressed    Arm warm to touch   Psychiatric: He has a normal mood and affect. His behavior is normal. Thought content normal.   Nursing note and vitals reviewed.      Significant Labs:   CMP   Recent Labs   Lab 07/01/20  1030 07/02/20  0626    136   K 4.6 5.6*   CL 95 96   CO2 24 17*   * 87   BUN 72* 86*   CREATININE 16.2* 17.8*   CALCIUM 9.6 9.6   PROT 8.6*  --    ALBUMIN 3.4*  --    BILITOT 0.5  --    ALKPHOS 76  --    AST 17  --    ALT 18  --    ANIONGAP 20* 23*   ESTGFRAFRICA 4* 3*   EGFRNONAA 3* 3*   , CBC   Recent Labs   Lab 07/01/20  1030 07/01/20  1433 07/02/20  0626   WBC 9.06 8.53 8.46   HGB 10.8* 9.9* 10.5*   HCT 32.0* 30.0* 33.2*    124* 113*   , Troponin No results for input(s): TROPONINI in the last 48 hours. and All pertinent lab results from the last 24 hours have been reviewed.    Significant Imaging: Echocardiogram:   2D echo with color flow doppler:   Results for orders placed or performed during the hospital encounter of 04/28/19   2D echo with color  flow doppler   Result Value Ref Range    QEF 60 55 - 65    Diastolic Dysfunction Yes (A)     Est. PA Systolic Pressure 57.15 (A)     Tricuspid Valve Regurgitation MILD     Narrative    Date of Procedure: 05/01/2019        TEST DESCRIPTION       Aorta: The aortic root is normal in size, measuring 3.6 cm at sinotubular junction and 3.8 cm at Sinuses of Valsalva. The proximal ascending aorta is normal in size, measuring 3.4 cm across.     Left Atrium: The left atrial volume index is mildly enlarged, measuring 36.75 cc/m2.     Left Ventricle: The left ventricle is normal in size, with an end-diastolic diameter of 4.1 cm, and an end-systolic diameter of 2.4 cm. LV wall thickness is normal, with the septum measuring 1.8 cm and the posterior wall measuring 1.7 cm across. Relative   wall thickness was increased at 0.83, and the LV mass index was increased at 173.5 g/m2 consistent with concentric left ventricular hypertrophy. There are no regional wall motion abnormalities. Left ventricular systolic function appears normal. Visually   estimated ejection fraction is 60-65%. The LV Doppler derived stroke volume equals 129.0 ccs.     Diastolic indices: E wave velocity 1.4 m/s, E/A ratio 1.3,  msec., E/e' ratio(avg) 20. There is pseudonormalization of mitral inflow pattern consistent with significant diastolic dysfunction.     Right Atrium: The right atrium is normal in size, measuring 5.8 cm in length and 4.0 cm in width in the apical view.     Right Ventricle: The right ventricle is normal in size. Global right ventricular systolic function appears normal. Tricuspid annular plane systolic excursion (TAPSE) is 1.7 cm. The estimated PA systolic pressure is greater than 57 mmHg.     Aortic Valve:  There is a mechanical prosthesis present in the aortic position. The peak velocity obtained across the aortic valve is 3.89 m/s, which translates to a peak gradient of 61 mmHg. The mean gradient is 31 mmHg. Using a left  ventricular outflow   tract diameter of 2.2 cm, a left ventricular outflow tract velocity time integral of 34 cm, and a peak instantaneous transvalvular velocity time integral of 64 cm, the effective prosthetic valve area is 2.01 cm2(p AVAi is 0.94 cm2/m2).     Mitral Valve:  The pressure half time is 38 msec. The calculated mitral valve area is 5.79 cm2. Mitral valve is normal in structure with normal leaflet mobility.     Tricuspid Valve:  There is mild tricuspid regurgitation. Tricuspid valve is normal in structure with normal leaflet mobility.     Pulmonary Valve:  Pulmonary valve is normal in structure with normal leaflet mobility.     Intracavitary: There is no evidence of pericardial effusion, intracavity mass, thrombi, or vegetation.         CONCLUSIONS     1 - Mild left atrial enlargement.     2 - Concentric hypertrophy.     3 - No wall motion abnormalities.     4 - Normal left ventricular systolic function (EF 60-65%).     5 - Impaired LV relaxation, elevated LAP (grade 2 diastolic dysfunction).     6 - Normal right ventricular systolic function .     7 - Pulmonary hypertension. The estimated PA systolic pressure is greater than 57 mmHg.     8 - Aortic valve prosthesis, JHOANA = 2.01 cm2, AVAi = 0.94 cm2/m2, peak velocity = 3.89 m/s, mean gradient = 31 mmHg.     9 - Mild tricuspid regurgitation.             This document has been electronically    SIGNED BY: Shai Calderon MD On: 05/01/2019 15:34   , EKG: Reviewed and X-Ray: CXR: X-Ray Chest 1 View (CXR): No results found for this visit on 07/01/20. and X-Ray Chest PA and Lateral (CXR): No results found for this visit on 07/01/20.    Assessment and Plan:   Patient who presents with NSVT in setting of infection. Asymptomatic. BB added. Pharmacy consulted given history of AVR, needs heparin bridge if INR < 2.5.     * Arteriovenous graft infection  -Mgmt as per primary team  -Vascular surgery on board, plans for AV graft removal    NSVT (nonsustained  ventricular tachycardia)  -Likely triggered by AV fistula infection  -Asymptomatic  -Continue home meds  -Labetalol 100 mg BID added    ESRD (end stage renal disease) on dialysis  -Mgmt as per nephrology    S/P AVR (aortic valve replacement)  -History of mechanical AVR  -INR 3.2 yesterday  -Bridge with heparin gtt if INR < 2.5; pharmacy consulted     Essential hypertension  -Continue home meds  -Montior        VTE Risk Mitigation (From admission, onward)         Ordered     IP VTE HIGH RISK PATIENT  Once      07/01/20 1340     Place sequential compression device  Until discontinued      07/01/20 1340                Thank you for your consult. I will follow-up with patient. Please contact us if you have any additional questions.    Radha Slaughter PA-C  Cardiology   [facility]

## 2020-07-02 NOTE — PLAN OF CARE
Remains free from injury. States relief of pain with available prn meds. Fluid restriction maintained. NPO status maintained. Vital signs stable. Chart reviewed. Will continue to monitor.

## 2020-07-02 NOTE — HPI
Mr. White is a 50 year old male aptient whose current medical conditions include ASD (s/p closure), HFpEF, s/p AVR (mechanical) and TV annuloplasty), HTN, PHTN, SVT (s/p RFA 3/17), and ESRD on HD who presented to [facility] ED yesterday with a chief complaint of bleeding from his L arm shunt. Associated symptoms included dizziness and diaphoresis. Patient denied any associated iesha chest pain, fever, chills, palpitations, near syncope, or syncope. While in ED, patient continued to have active bleeding which required suture placement and compression dressing. Initial workup revealed H/H of 10.8/32.0 and patient was subsequently admitted for further evaluation and treatment. Overnight, patient reportedly had runs of NSVT and cardiology consulted to assist with management. Patient seen and examined today, F. Remains chest pain free. He reports compliance with his medications. On long term AC (Coumadin) due to history of mechanical AVR. Chart reviewed. H/H has remained stable. INR 3.2 yesterday. Echo 3/20 showed normal EF. Stress 3/20 negative for ischemia. Vascular surgery on board and planning on access removal and Vas-cath placement.      Reviewed telemetry strips---NSVT confirmed.

## 2020-07-02 NOTE — INTERVAL H&P NOTE
The patient has been examined and the H&P has been reviewed:    I concur with the findings and no changes have occurred since H&P was written.    Anesthesia/Surgery risks, benefits and alternative options discussed and understood by patient/family.          Active Hospital Problems    Diagnosis  POA    *Arteriovenous graft infection [T82.7XXA]  Yes    NSVT (nonsustained ventricular tachycardia) [I47.2]  Unknown    Atrial fibrillation [I48.91]  Yes    ESRD (end stage renal disease) on dialysis [N18.6, Z99.2]  Not Applicable     Chronic    S/P AVR (aortic valve replacement) [Z95.2]  Not Applicable    Essential hypertension [I10]  Yes      Resolved Hospital Problems   No resolved problems to display.

## 2020-07-02 NOTE — ANESTHESIA POSTPROCEDURE EVALUATION
Anesthesia Post Evaluation    Patient: Isidro White Jr.    Procedure(s) Performed: Procedure(s) (LRB):  REMOVAL, GRAFT (Left)  INSERTION, CENTRAL VENOUS ACCESS DEVICE (Left)    Final Anesthesia Type: general    Patient location during evaluation: PACU  Patient participation: Yes- Able to Participate  Level of consciousness: awake and alert  Post-procedure vital signs: reviewed and stable  Pain management: adequate  Airway patency: patent  DANIEL mitigation strategies: Extubation while patient is awake  PONV status at discharge: No PONV  Anesthetic complications: no      Cardiovascular status: hemodynamically stable  Respiratory status: spontaneous ventilation  Hydration status: euvolemic  Follow-up not needed.          Vitals Value Taken Time   /85 07/02/20 1724   Temp 36.1 °C (97 °F) 07/02/20 1724   Pulse 79 07/02/20 1725   Resp 14 07/02/20 1724   SpO2 94 % 07/02/20 1725   Vitals shown include unvalidated device data.      Event Time   Out of Recovery 07/02/2020 17:26:00         Pain/Dru Score: Pain Rating Prior to Med Admin: 8 (7/2/2020  5:23 PM)  Pain Rating Post Med Admin: 0 (7/2/2020  9:51 AM)  Dru Score: 9 (7/2/2020  5:15 PM)

## 2020-07-02 NOTE — SUBJECTIVE & OBJECTIVE
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     HD M, W, F    ESRD on dialysis     Hepatitis B     Hyperlipidemia     Hypertension     DANIEL (obstructive sleep apnea) 11/12/2019    Secondary hyperparathyroidism of renal origin     Supraventricular tachycardia     atrial tachycardia    Tachycardia     Valvular regurgitation     AI, TR       Past Surgical History:   Procedure Laterality Date    ASD REPAIR      age 8 [facility]    AV Graft Creation Left 03/2017    [facility]      [facility]     CARDIAC VALVE SURGERY  02/08/2017    22 mm Medtronic AV, 28 mm TV Medtronic annuloplaty ring    COLONOSCOPY N/A 8/27/2019    Procedure: COLONOSCOPY;  Surgeon: Addie John MD;  Location: [facility] ENDO;  Service: Endoscopy;  Laterality: N/A;  dialysis pt *needs K*    ESOPHAGOGASTRODUODENOSCOPY N/A 8/27/2019    Procedure: EGD (ESOPHAGOGASTRODUODENOSCOPY);  Surgeon: Addie John MD;  Location: [facility] ENDO;  Service: Endoscopy;  Laterality: N/A;    RADIOFREQUENCY ABLATION  03/13/2017    Atrial tachycardia       Review of patient's allergies indicates:  No Known Allergies    Current Facility-Administered Medications on File Prior to Encounter   Medication    0.9%  NaCl infusion    sodium chloride 0.9% flush 10 mL     Current Outpatient Medications on File Prior to Encounter   Medication Sig    albuterol (VENTOLIN HFA) 90 mcg/actuation inhaler Inhale 2 puffs into the lungs every 6 (six) hours as needed for Wheezing or Shortness of Breath. Rescue    amLODIPine (NORVASC) 10 MG tablet TAKE ONE TABLET BY MOUTH EVERY DAY    amoxicillin (AMOXIL) 500 MG capsule Take 1 capsule (500 mg total) by mouth 3 (three) times daily x 14 days    ascorbic acid, vitamin C, (VITAMIN C) 500 MG tablet Take 1  tablet (500 mg total) by mouth 2 (two) times daily.    b complex vitamins tablet Take 1 tablet by mouth once daily.    calcium acetate (PHOSLO) 667 mg capsule Take 1 capsule (667 mg total) by mouth 3 (three) times daily.    diltiaZEM (CARDIZEM CD) 360 MG 24 hr capsule Take 1 capsule (360 mg total) by mouth once daily.    enoxaparin (LOVENOX) 100 mg/mL Syrg Inject 0.9 mLs (90 mg total) into the skin once daily.    epoetin vikki (PROCRIT) 10,000 unit/mL injection Inject 1 mL (10,000 Units total) into the skin every Mon, Wed, Fri. To be given with HD    fish oil-omega-3 fatty acids 300-1,000 mg capsule Take 2 g by mouth once daily.    hydrALAZINE (APRESOLINE) 100 MG tablet TAKE ONE TABLET BY MOUTH THREE TIMES DAILY INCLUDING DIALYSIS DAYS    hydrOXYzine pamoate (VISTARIL) 25 MG Cap T1C PO QD PRN ITCHING    labetalol (NORMODYNE) 100 MG tablet TAKE ONE TABLET BY MOUTH THREE TIMES DAILY AS NEEDED FOR HEART RATE > 120    lisinopril (PRINIVIL,ZESTRIL) 40 MG tablet TAKE ONE TABLET BY MOUTH EVERY DAY    multivitamin with minerals tablet Take 1 tablet by mouth once daily.    omega-3 fatty acids-vitamin E (FISH OIL) 1,000 mg Cap Take 1 capsule by mouth once daily.    pantoprazole (PROTONIX) 40 MG tablet TAKE ONE TABLET BY MOUTH EVERY DAY    ETHAN-RACQUEL RX 1- mg-mg-mcg Tab TAKE ONE TABLET BY MOUTH EVERY DAY    tenofovir (VIREAD) 300 mg Tab Take 1 tablet (300 mg total) by mouth once a week.    vitamin D (VITAMIN D3) 1000 units Tab Take 1,000 Units by mouth 3 (three) times a week.    warfarin (COUMADIN) 7.5 MG tablet Take 2 tablets by mouth on Thursdays and 1 tablet on all other days of the week. (Patient taking differently: Take 2 tablets by mouth on Thursdays and Saturday; and 1 tablet on all other days of the week.)     Family History     Problem Relation (Age of Onset)    Anesthesia problems Paternal Uncle    Diabetes Paternal Aunt, Paternal Aunt    Heart attack Father    Heart failure Paternal Grandmother     Hypertension Paternal Aunt    Leukemia Mother, Paternal Aunt    No Known Problems Sister, Brother, Sister, Sister    Stroke Paternal Aunt    Suicide Paternal Uncle    Valvular heart disease Maternal Aunt        Tobacco Use    Smoking status: Never Smoker    Smokeless tobacco: Never Used   Substance and Sexual Activity    Alcohol use: No     Frequency: Never     Comment: quit in 2016; does have heavy drinking history; started drinking at age 13; was drinking a 12 pack over the weekend and two 24 ounces of beer a day during the week along with a pint of hard alcohol    Drug use: No    Sexual activity: Not on file     Review of Systems   Constitution: Positive for malaise/fatigue.   HENT: Negative.    Eyes: Negative.    Cardiovascular: Negative.    Respiratory: Negative.    Endocrine: Negative.    Hematologic/Lymphatic: Bruises/bleeds easily.   Skin: Negative.    Musculoskeletal: Positive for joint swelling (left arm).   Gastrointestinal: Negative.    Genitourinary: Negative.    Neurological: Negative.    Psychiatric/Behavioral: Negative.    Allergic/Immunologic: Negative.      Objective:     Vital Signs (Most Recent):  Temp: 97.8 °F (36.6 °C) (07/02/20 0705)  Pulse: 91 (07/02/20 0705)  Resp: 18 (07/02/20 0705)  BP: (!) 150/88 (07/02/20 0705)  SpO2: (!) 94 % (07/02/20 0705) Vital Signs (24h Range):  Temp:  [97.5 °F (36.4 °C)-98.7 °F (37.1 °C)] 97.8 °F (36.6 °C)  Pulse:  [] 91  Resp:  [14-31] 18  SpO2:  [93 %-99 %] 94 %  BP: (133-191)/(69-93) 150/88     Weight: 103 kg (227 lb 1.2 oz)  Body mass index is 29.15 kg/m².    SpO2: (!) 94 %  O2 Device (Oxygen Therapy): room air      Intake/Output Summary (Last 24 hours) at 7/2/2020 0830  Last data filed at 7/2/2020 0301  Gross per 24 hour   Intake 130 ml   Output --   Net 130 ml       Lines/Drains/Airways     Drain                 Hemodialysis AV Graft 03/16/17 0859 Left upper arm 1203 days          Airway                 Airway - Non-Surgical 08/27/19 1341 309  days         Airway - Non-Surgical 08/27/19 1341 Nasal Cannula 309 days          Peripheral Intravenous Line                 Peripheral IV - Single Lumen 07/01/20 18 G Right Antecubital 1 day                Physical Exam   Constitutional: He is oriented to person, place, and time. He appears well-developed and well-nourished. No distress.   HENT:   Head: Normocephalic and atraumatic.   Eyes: Pupils are equal, round, and reactive to light. Right eye exhibits no discharge. Left eye exhibits no discharge.   Neck: Neck supple. No JVD present.   Cardiovascular: Normal rate, regular rhythm, S1 normal and S2 normal.   Murmur heard.  +mechanical click   Pulmonary/Chest: Effort normal and breath sounds normal. No respiratory distress. He has no wheezes. He has no rales.   Abdominal: Soft. He exhibits no distension.   Musculoskeletal:         General: No edema.   Neurological: He is alert and oriented to person, place, and time.   Skin: Skin is warm and dry. He is not diaphoretic. No erythema.   Left arm fistula wrapped/dressed    Arm warm to touch   Psychiatric: He has a normal mood and affect. His behavior is normal. Thought content normal.   Nursing note and vitals reviewed.      Significant Labs:   CMP   Recent Labs   Lab 07/01/20  1030 07/02/20  0626    136   K 4.6 5.6*   CL 95 96   CO2 24 17*   * 87   BUN 72* 86*   CREATININE 16.2* 17.8*   CALCIUM 9.6 9.6   PROT 8.6*  --    ALBUMIN 3.4*  --    BILITOT 0.5  --    ALKPHOS 76  --    AST 17  --    ALT 18  --    ANIONGAP 20* 23*   ESTGFRAFRICA 4* 3*   EGFRNONAA 3* 3*   , CBC   Recent Labs   Lab 07/01/20  1030 07/01/20  1433 07/02/20  0626   WBC 9.06 8.53 8.46   HGB 10.8* 9.9* 10.5*   HCT 32.0* 30.0* 33.2*    124* 113*   , Troponin No results for input(s): TROPONINI in the last 48 hours. and All pertinent lab results from the last 24 hours have been reviewed.    Significant Imaging: Echocardiogram:   2D echo with color flow doppler:   Results for orders  placed or performed during the hospital encounter of 04/28/19   2D echo with color flow doppler   Result Value Ref Range    QEF 60 55 - 65    Diastolic Dysfunction Yes (A)     Est. PA Systolic Pressure 57.15 (A)     Tricuspid Valve Regurgitation MILD     Narrative    Date of Procedure: 05/01/2019        TEST DESCRIPTION       Aorta: The aortic root is normal in size, measuring 3.6 cm at sinotubular junction and 3.8 cm at Sinuses of Valsalva. The proximal ascending aorta is normal in size, measuring 3.4 cm across.     Left Atrium: The left atrial volume index is mildly enlarged, measuring 36.75 cc/m2.     Left Ventricle: The left ventricle is normal in size, with an end-diastolic diameter of 4.1 cm, and an end-systolic diameter of 2.4 cm. LV wall thickness is normal, with the septum measuring 1.8 cm and the posterior wall measuring 1.7 cm across. Relative   wall thickness was increased at 0.83, and the LV mass index was increased at 173.5 g/m2 consistent with concentric left ventricular hypertrophy. There are no regional wall motion abnormalities. Left ventricular systolic function appears normal. Visually   estimated ejection fraction is 60-65%. The LV Doppler derived stroke volume equals 129.0 ccs.     Diastolic indices: E wave velocity 1.4 m/s, E/A ratio 1.3,  msec., E/e' ratio(avg) 20. There is pseudonormalization of mitral inflow pattern consistent with significant diastolic dysfunction.     Right Atrium: The right atrium is normal in size, measuring 5.8 cm in length and 4.0 cm in width in the apical view.     Right Ventricle: The right ventricle is normal in size. Global right ventricular systolic function appears normal. Tricuspid annular plane systolic excursion (TAPSE) is 1.7 cm. The estimated PA systolic pressure is greater than 57 mmHg.     Aortic Valve:  There is a mechanical prosthesis present in the aortic position. The peak velocity obtained across the aortic valve is 3.89 m/s, which translates  to a peak gradient of 61 mmHg. The mean gradient is 31 mmHg. Using a left ventricular outflow   tract diameter of 2.2 cm, a left ventricular outflow tract velocity time integral of 34 cm, and a peak instantaneous transvalvular velocity time integral of 64 cm, the effective prosthetic valve area is 2.01 cm2(p AVAi is 0.94 cm2/m2).     Mitral Valve:  The pressure half time is 38 msec. The calculated mitral valve area is 5.79 cm2. Mitral valve is normal in structure with normal leaflet mobility.     Tricuspid Valve:  There is mild tricuspid regurgitation. Tricuspid valve is normal in structure with normal leaflet mobility.     Pulmonary Valve:  Pulmonary valve is normal in structure with normal leaflet mobility.     Intracavitary: There is no evidence of pericardial effusion, intracavity mass, thrombi, or vegetation.         CONCLUSIONS     1 - Mild left atrial enlargement.     2 - Concentric hypertrophy.     3 - No wall motion abnormalities.     4 - Normal left ventricular systolic function (EF 60-65%).     5 - Impaired LV relaxation, elevated LAP (grade 2 diastolic dysfunction).     6 - Normal right ventricular systolic function .     7 - Pulmonary hypertension. The estimated PA systolic pressure is greater than 57 mmHg.     8 - Aortic valve prosthesis, JHOANA = 2.01 cm2, AVAi = 0.94 cm2/m2, peak velocity = 3.89 m/s, mean gradient = 31 mmHg.     9 - Mild tricuspid regurgitation.             This document has been electronically    SIGNED BY: Shai Calderon MD On: 05/01/2019 15:34   , EKG: Reviewed and X-Ray: CXR: X-Ray Chest 1 View (CXR): No results found for this visit on 07/01/20. and X-Ray Chest PA and Lateral (CXR): No results found for this visit on 07/01/20.

## 2020-07-02 NOTE — ANESTHESIA PREPROCEDURE EVALUATION
07/02/2020  Isidro White Jr. is a 50 y.o., male.    Anesthesia Evaluation    I have reviewed the Patient Summary Reports.    I have reviewed the Nursing Notes.    I have reviewed the Medications.     Review of Systems  Anesthesia Hx:  No problems with previous Anesthesia    Social:  Non-Smoker    Hematology/Oncology:         -- Anemia:   Cardiovascular:   Hypertension CHF ECG has been reviewed. S/P AVR   Pulmonary:   COPD Sleep Apnea    Renal/:   Chronic Renal Disease    Hepatic/GI:   Liver Disease, Hepatitis    Neurological:  Neurology Normal    Endocrine:  Endocrine Normal      Patient Active Problem List   Diagnosis    Essential hypertension    Hypertensive cardiomegaly with heart failure    Pulmonary HTN    Nonrheumatic aortic valve stenosis    Adult congenital heart disease    Hyperglycemia    S/P AVR (aortic valve replacement)    Postprocedural hypotension    ESRD (end stage renal disease) on dialysis    Severe protein-calorie malnutrition    Anemia of chronic disease    Chronic diastolic heart failure    Atrial tachycardia    History of radiofrequency ablation for complex right atrial arrhythmia    Drug-induced erectile dysfunction    Chronic pulmonary heart disease    Secondary hyperparathyroidism of renal origin    Chronic viral hepatitis B without delta agent and without coma    Atrial fibrillation    Anticoagulated    Encounter for colorectal cancer screening    Colon cancer screening    Special screening for malignant neoplasms, colon    Abnormal diffusion capacity determined by pulmonary function test    Mixed restrictive and obstructive lung disease    DANIEL (obstructive sleep apnea)    Nocturnal hypoxemia due to obstructive chronic bronchitis    Pulmonary nodule, left    Psychophysiological insomnia    Nightmares    Primary snoring    Periodic limb movement  disorder (PLMD)    Inadequate sleep hygiene    End stage renal disease    Arteriovenous graft infection    NSVT (nonsustained ventricular tachycardia)     Current Facility-Administered Medications on File Prior to Encounter   Medication Dose Route Frequency Provider Last Rate Last Dose    0.9%  NaCl infusion   Intravenous Continuous Christopher Jane MD        sodium chloride 0.9% flush 10 mL  10 mL Intravenous PRN Christopher Jane MD         Current Outpatient Medications on File Prior to Encounter   Medication Sig Dispense Refill    albuterol (VENTOLIN HFA) 90 mcg/actuation inhaler Inhale 2 puffs into the lungs every 6 (six) hours as needed for Wheezing or Shortness of Breath. Rescue 18 g 3    amLODIPine (NORVASC) 10 MG tablet TAKE ONE TABLET BY MOUTH EVERY DAY 30 tablet 11    amoxicillin (AMOXIL) 500 MG capsule Take 1 capsule (500 mg total) by mouth 3 (three) times daily x 14 days 42 capsule 0    ascorbic acid, vitamin C, (VITAMIN C) 500 MG tablet Take 1 tablet (500 mg total) by mouth 2 (two) times daily.      b complex vitamins tablet Take 1 tablet by mouth once daily.      calcium acetate (PHOSLO) 667 mg capsule Take 1 capsule (667 mg total) by mouth 3 (three) times daily. 90 capsule 11    diltiaZEM (CARDIZEM CD) 360 MG 24 hr capsule Take 1 capsule (360 mg total) by mouth once daily. 90 capsule 1    enoxaparin (LOVENOX) 100 mg/mL Syrg Inject 0.9 mLs (90 mg total) into the skin once daily. 5 mL 0    epoetin vikki (PROCRIT) 10,000 unit/mL injection Inject 1 mL (10,000 Units total) into the skin every Mon, Wed, Fri. To be given with HD      fish oil-omega-3 fatty acids 300-1,000 mg capsule Take 2 g by mouth once daily.      hydrALAZINE (APRESOLINE) 100 MG tablet TAKE ONE TABLET BY MOUTH THREE TIMES DAILY INCLUDING DIALYSIS DAYS 90 tablet 5    hydrOXYzine pamoate (VISTARIL) 25 MG Cap T1C PO QD PRN ITCHING 30 capsule 5    labetalol (NORMODYNE) 100 MG tablet TAKE ONE TABLET BY MOUTH THREE TIMES DAILY AS  NEEDED FOR HEART RATE > 120 90 tablet 3    lisinopril (PRINIVIL,ZESTRIL) 40 MG tablet TAKE ONE TABLET BY MOUTH EVERY DAY 30 tablet 9    multivitamin with minerals tablet Take 1 tablet by mouth once daily.      omega-3 fatty acids-vitamin E (FISH OIL) 1,000 mg Cap Take 1 capsule by mouth once daily.  0    pantoprazole (PROTONIX) 40 MG tablet TAKE ONE TABLET BY MOUTH EVERY DAY 30 tablet 9    ETHAN-RACQUEL RX 1- mg-mg-mcg Tab TAKE ONE TABLET BY MOUTH EVERY DAY 90 tablet 1    tenofovir (VIREAD) 300 mg Tab Take 1 tablet (300 mg total) by mouth once a week. 12 tablet 0    vitamin D (VITAMIN D3) 1000 units Tab Take 1,000 Units by mouth 3 (three) times a week.      warfarin (COUMADIN) 7.5 MG tablet Take 2 tablets by mouth on Thursdays and 1 tablet on all other days of the week. (Patient taking differently: Take 2 tablets by mouth on Thursdays and Saturday; and 1 tablet on all other days of the week.) 35 tablet 2     Past Surgical History:   Procedure Laterality Date    ASD REPAIR      age 8 [facility]    AV Graft Creation Left 03/2017    [facility]      [facility]     CARDIAC VALVE SURGERY  02/08/2017    22 mm Medtronic AV, 28 mm TV Medtronic annuloplaty ring    COLONOSCOPY N/A 8/27/2019    Procedure: COLONOSCOPY;  Surgeon: Addie John MD;  Location: [facility];  Service: Endoscopy;  Laterality: N/A;  dialysis pt *needs K*    ESOPHAGOGASTRODUODENOSCOPY N/A 8/27/2019    Procedure: EGD (ESOPHAGOGASTRODUODENOSCOPY);  Surgeon: Addie John MD;  Location: [facility];  Service: Endoscopy;  Laterality: N/A;    RADIOFREQUENCY ABLATION  03/13/2017    Atrial tachycardia         Physical Exam  General:  Well nourished    Airway/Jaw/Neck:  Airway Findings: Mouth Opening: Normal Tongue: Normal  General Airway Assessment: Adult  Mallampati: II  TM Distance: 4 - 6 cm  Jaw/Neck Findings:  Neck ROM: Normal ROM      Dental:  Dental Findings: In tact    Chest/Lungs:  Chest/Lungs Findings: Clear to  auscultation, Normal Respiratory Rate     Heart/Vascular:  Heart Findings: Rate: Normal  Rhythm: Regular Rhythm  Sounds: Normal        Mental Status:  Mental Status Findings:  Cooperative, Alert and Oriented         Anesthesia Plan  Type of Anesthesia, risks & benefits discussed:  Anesthesia Type:  general  Patient's Preference:   Intra-op Monitoring Plan: standard ASA monitors  Intra-op Monitoring Plan Comments:   Post Op Pain Control Plan: multimodal analgesia and per primary service following discharge from PACU  Post Op Pain Control Plan Comments:   Induction:   IV  Beta Blocker:         Informed Consent: Patient understands risks and agrees with Anesthesia plan.  Questions answered. Anesthesia consent signed with patient.  ASA Score: 4     Day of Surgery Review of History & Physical:  There are no significant changes.          Ready For Surgery From Anesthesia Perspective.       Chemistry        Component Value Date/Time     07/02/2020 0626    K 5.6 (H) 07/02/2020 0626    CL 96 07/02/2020 0626    CO2 17 (L) 07/02/2020 0626    BUN 86 (H) 07/02/2020 0626    CREATININE 17.8 (H) 07/02/2020 0626    GLU 87 07/02/2020 0626        Component Value Date/Time    CALCIUM 9.6 07/02/2020 0626    ALKPHOS 76 07/01/2020 1030    AST 17 07/01/2020 1030    ALT 18 07/01/2020 1030    BILITOT 0.5 07/01/2020 1030    ESTGFRAFRICA 3 (A) 07/02/2020 0626    EGFRNONAA 3 (A) 07/02/2020 0626        Lab Results   Component Value Date    WBC 8.46 07/02/2020    HGB 10.5 (L) 07/02/2020    HCT 33.2 (L) 07/02/2020     (H) 07/02/2020     (L) 07/02/2020     Echo: 3/10/20  · Mild concentric left ventricular hypertrophy.  · Moderate left atrial enlargement.  · Normal left ventricular systolic function. The estimated ejection fraction is 60%.  · Grade II (moderate) left ventricular diastolic dysfunction consistent with pseudonormalization.  · Normal right ventricular systolic function.  · There is a mechanical aortic valve present.  Mean gradient is 38 mmHg.  · Mild mitral regurgitation.  · Mild tricuspid regurgitation.  · Normal central venous pressure (3 mmHg).  · The estimated PA systolic pressure is 42 mmHg.  · Pulmonary hypertension present.  · Mild mitral stenosis.  · Mild right ventricular enlargement.

## 2020-07-02 NOTE — PLAN OF CARE
Pt remains free from falls/injuries this shift. Safety precautions maintained. Pain managed with IV pain medication. Pressure dressing to LUE intact. Pt NPO for surgery. No s/s of acute distress noted. Will continue to monitor. Chart check completed.

## 2020-07-02 NOTE — PLAN OF CARE
Pt resting on stretcher s/p excision graft to lt arm with vas cath to rt IJ performed under general anesthesia. Respirations even and unlabored on room air and tolerating well with O2 sats of 95%, VSS. Will cont to monitor. See flow sheet for detailed assessment.

## 2020-07-02 NOTE — PLAN OF CARE
Dr. Hernandez, anesthesiologist, contacted family, Marizol King, regarding anesthesia information per pt's request.

## 2020-07-02 NOTE — ASSESSMENT & PLAN NOTE
-History of mechanical AVR  -INR 3.2 yesterday  -Bridge with heparin gtt if INR < 2.5; pharmacy consulted

## 2020-07-02 NOTE — PROGRESS NOTES
[facility] -   Nephrology  Progress Note    Patient Name: Isidro White Jr.  MRN: [id number]  Admission Date: 7/1/2020  Hospital Length of Stay: 0 days  Attending Provider: Nicolás Gerardo MD   Primary Care Physician: Miguel Campo MD (Inactive)  Principal Problem:Arteriovenous graft infection    Consults  Subjective:     Interval History: seen after surgery, left arm AVG removed, right IJ vascath placed,     Review of patient's allergies indicates:  No Known Allergies  Current Facility-Administered Medications   Medication Frequency    albuterol-ipratropium 2.5 mg-0.5 mg/3 mL nebulizer solution 3 mL Q6H PRN    amLODIPine tablet 10 mg Daily    calcium acetate(phosphat bind) capsule 667 mg TID    cefepime in dextrose 5 % IVPB 2 g Q24H    diltiaZEM 24 hr capsule 360 mg Daily    hydrALAZINE tablet 100 mg Q8H    hydromorphone (PF) injection 0.2 mg Q5 Min PRN    HYDROmorphone injection 0.5 mg Q4H PRN    hydrOXYzine pamoate capsule 25 mg Q8H PRN    labetaloL tablet 100 mg Q12H    lisinopriL tablet 40 mg Daily    nozaseptin (NOZIN) nasal  BID    oxyCODONE-acetaminophen 5-325 mg per tablet 1 tablet Q3H PRN    pantoprazole EC tablet 40 mg Daily    sodium chloride 0.9% flush 10 mL PRN    tenofovir tablet 300 mg Weekly    vancomycin - pharmacy to dose pharmacy to manage frequency    vit b cmplx 3-fa-vit c-biotin 1- mg-mg-mcg per tablet 1 tablet Daily    warfarin (COUMADIN) tablet 15 mg Every Thurs    [START ON 7/4/2020] warfarin (COUMADIN) tablet 15 mg Every Sat    [START ON 7/3/2020] warfarin tablet 7.5 mg Every Mon, Wed, Fri, Sun    [START ON 7/7/2020] warfarin tablet 7.5 mg Every Tues     Facility-Administered Medications Ordered in Other Encounters   Medication Frequency    0.9%  NaCl infusion Continuous    sodium chloride 0.9% flush 10 mL PRN       Objective:     Vital Signs (Most Recent):  Temp: 97.5 °F (36.4 °C) (07/02/20 1623)  Pulse: 88 (07/02/20  Problem: Pressure Injury - Risk of 
Goal: *Prevention of pressure injury Document Gregor Scale and appropriate interventions in the flowsheet. Outcome: Progressing Towards Goal 
Pressure Injury Interventions: 1700)  Resp: 17 (07/02/20 1723)  BP: (!) 158/82 (07/02/20 1700)  SpO2: 95 % (07/02/20 1700)  O2 Device (Oxygen Therapy): room air (07/02/20 1700) Vital Signs (24h Range):  Temp:  [97.5 °F (36.4 °C)-98.9 °F (37.2 °C)] 97.5 °F (36.4 °C)  Pulse:  [70-96] 88  Resp:  [10-38] 17  SpO2:  [86 %-95 %] 95 %  BP: (125-173)/(72-88) 158/82     Weight: 103 kg (227 lb 1.2 oz) (07/01/20 1436)  Body mass index is 29.15 kg/m².  Body surface area is 2.32 meters squared.    I/O last 3 completed shifts:  In: 130 [P.O.:80; IV Piggyback:50]  Out: -     Physical Exam       Physical Exam  Vitals signs and nursing note reviewed.   Constitutional:       General: He is not in mild distress     Appearance: He is well-developed. He is not diaphoretic.   HENT:      Head: Normocephalic and atraumatic. Right IJ vascath, small hematoma  Eyes:      Pupils: Pupils are equal, round, and reactive to light.   Neck:      Musculoskeletal: Normal range of motion and neck supple.      Thyroid: No thyromegaly.      Trachea: No tracheal deviation.   Cardiovascular:      Rate and Rhythm: Normal rate and regular rhythm.      Heart sounds: Normal heart sounds. No murmur. No friction rub. No gallop.    Pulmonary:      Effort: Pulmonary effort is normal.      Breath sounds: Rales present. No wheezing.   Abdominal:      Palpations: Abdomen is soft. There is no mass.      Tenderness: There is no abdominal tenderness. There is no guarding or rebound.   Musculoskeletal: Normal range of motion.      Comments: Left arm dressings in place    Lymphadenopathy:      Cervical: No cervical adenopathy.   Skin:     General: Skin is warm.      Findings: No erythema or rash.   Neurological:      Mental Status: He is alert and oriented to person, place, and time.         Significant Labs:sure  CBC:   Recent Labs   Lab 07/02/20  0626   WBC 8.46   RBC 3.01*   HGB 10.5*   HCT 33.2*   *   *   MCH 34.9*   MCHC 31.6*     CMP:   Recent Labs   Lab 07/01/20  1030  07/02/20  0626   * 87   CALCIUM 9.6 9.6   ALBUMIN 3.4*  --    PROT 8.6*  --     136   K 4.6 5.6*   CO2 24 17*   CL 95 96   BUN 72* 86*   CREATININE 16.2* 17.8*   ALKPHOS 76  --    ALT 18  --    AST 17  --    BILITOT 0.5  --      Coagulation:   Recent Labs   Lab 07/01/20  1030   INR 3.2*   APTT 52.3*     LFTs:   Recent Labs   Lab 07/01/20  1030   ALT 18   AST 17   ALKPHOS 76   BILITOT 0.5   PROT 8.6*   ALBUMIN 3.4*     All labs within the past 24 hours have been reviewed.    Significant Imaging:  Reviewed    Lab Results   Component Value Date    .0 (H) 01/08/2019    CALCIUM 9.6 07/02/2020    PHOS 4.4 05/01/2019         Assessment/Plan:     Active Diagnoses:    Diagnosis Date Noted POA    PRINCIPAL PROBLEM:  Arteriovenous graft infection [T82.7XXA] 07/01/2020 Yes    NSVT (nonsustained ventricular tachycardia) [I47.2] 07/02/2020 Yes    Bleeding from dialysis shunt [T82.838A] 07/02/2020 Yes    Atrial fibrillation [I48.91] 03/27/2019 Yes    ESRD (end stage renal disease) on dialysis [N18.6, Z99.2]  Not Applicable     Chronic    S/P AVR (aortic valve replacement) [Z95.2] 02/09/2017 Not Applicable    Essential hypertension [I10]  Yes      Problems Resolved During this Admission:         1. ESRD on HD, MWF, [facility], LUE AVG , left arm AV graft infected, removed today, has a right IJ Vas-Cath, patient missed his dialysis treatment yesterday, will plan makeup treatment today and again regular dialysis treatment tomorrow, ultrafiltration on dialysis as tolerated, blood cultures are negative so far, continue vancomycin    2.  Hypertension, resume home blood pressure medications,    3.  Anemia, multifactorial, monitor hemoglobin, Procrit with dialysis, PRBC transfusion when indicated,    4.  Secondary hyperparathyroidism, continue renal diet, PhosLo,    5.  Volume, continue fluid restriction of about 1200 mL per day, low-salt diet,    6.  Status post surgery, removal of infected left arm AV graft  right IJ Vas-Cath placement,    7.  Disposition, discharge home when clinically stable,      Thank you for your consult. I will follow-up with patient. Please contact us if you have any additional questions.     Total time spent 40 minutes including time needed to review the records,  patient  evaluation, documentation, face-to-face discussion with the patient,  primary team,   more than 50% of the time was spent on coordination of care and counseling.       Gus Hurst MD  Nephrology  [facility] [place]

## 2020-07-02 NOTE — PROGRESS NOTES
7/2/20 - Secure chat message sent to KALEB Hill requesting referral if any needs are identified that can be met with help from [facility].  Daly Carmichael RN

## 2020-07-02 NOTE — CONSULTS
Clinical Pharmacy Consult Note: Coumadin Dosing and Monitoring     Plan: Patient will be continued on [facility] dose of 15 mg every Thu, Sat; 7.5 mg all other days.  PT/INR will be monitored daily. Dose adjustments will be made accordingly.    Isidro White Jr. 's Coumadin will be dosed and monitored by Pharmacy at the request of Dr. Gudino.    Indication: S/P AVR (aortic valve replacement), Atrial fibrillation  Target INR is 2-3.  INR   Date Value Ref Range Status   07/01/2020 3.2 (H) 0.8 - 1.2 Final     Comment:     Coumadin Therapy:  2.0 - 3.0 for INR for all indicators except mechanical heart valves  and antiphospholipid syndromes which should use 2.5 - 3.5.        Initial dose at verification: 15 mg today      Lab Results   Component Value Date    HGB 10.5 (L) 07/02/2020      Lab Results   Component Value Date    HCT 33.2 (L) 07/02/2020     Lab Results   Component Value Date     (L) 07/02/2020        Thank you for allowing us to participate in this patient's care.   Elvia Jean-Baptiste 7/2/2020 10:53 AM

## 2020-07-02 NOTE — PLAN OF CARE
Dr. Sidhu, surgeon, contacted family, Marizol King, regarding surgical information per pt's request.

## 2020-07-02 NOTE — NURSING
Pt had 15 beat run of vtach per monitor tech. Pt was asymptomatic. MD notified. No new orders at this time.

## 2020-07-02 NOTE — SIGNIFICANT EVENT
Notified of episodes of NSVT x 2.  Patient is asymptomatic. Labs reviewed and wnl. Likely related to stress from AV infection. Will give dose of labetaolol now(ordered prn at home). Will consult Cardiology for further recs. Echocardiogram from 3/2020 reviewed.

## 2020-07-02 NOTE — OP NOTE
[facility] -   Vascular Surgery  Operative Note    SUMMARY     Date of Procedure: 7/2/2020     Procedure: Procedure(s) (LRB):  REMOVAL, GRAFT (Left)  INSERTION, CENTRAL VENOUS ACCESS DEVICE (Left)     Excision of left arm infected acess graft with over-sew brachial artery with placement of right internal jugular Vas-Cath using ultrasound and fluoroscopy guidance    Surgeon(s) and Role:     * Sascha Sidhu MD - Primary    Assisting Surgeon: None    Pre-Operative Diagnosis: End stage renal disease [N18.6]    Post-Operative Diagnosis: Post-Op Diagnosis Codes:     * End stage renal disease [N18.6]    Anesthesia: General    Technical Procedures Used:  After consent was obtained risks and benefits explained the patient brought to the operating room and put in the supine position.  Once anesthesia was administered the right neck and thorax was prepped draped in a sterile fashion as well as a left upper extremity.  We placed a tourniquet for proximal arterial control and inflated the tourniquet.  We then excised the arterial end of the graft and removed the entire graft off the artery and repaired the artery with running 6 0 Prolene.  We then removed the entire graft all the venous portion as well and repaired the venous portion with running 4 0 Prolene.  The entire graft was then excised.  We then irrigated out all wounds and closed in a two-layer fashion with running 2 0 Vicryl then stapled shut.    We then turned our attention to the right neck in which we with ultrasound guidance we successfully cannulated the right internal jugular vein.  We did serial dilatation of the fluoroscopy guidance.  We advanced our wire into the inferior vena cava.  We tunneled the catheter in place the tip in the superior vena cava.  Each port was flushed and aspirated with heparinized saline and then locked with heparin.  It was sutured in place and the caps were placed.  The patient tolerated both procedures  well.    Description of the Findings of the Procedure:  Successful excision of the entire left forearm access graft.  Plus one radial pulse at the case    Placement of right internal jugular Vas-Cath the nausea under fluoroscopy guidance:  Widely patent right internal jugular vein with no DVT with successful cannulation with tip catheter placed in superior vena cava with no pneumothorax and no hemothorax.    Significant Surgical Tasks Conducted by the Assistant(s), if Applicable:  None    Complications: No    Estimated Blood Loss (EBL): * No values recorded between 7/2/2020  3:07 PM and 7/2/2020  4:23 PM *           Implants:   Implant Name Type Inv. Item Serial No.  Lot No. LRB No. Used Action   CATH HEMO-FLOW 14.5F X 24CM - [id number]  CATH HEMO-FLOW 14.5F X 24CM  [facility] [id number] Right 1 Implanted       Specimens:   Specimen (12h ago, onward)    None                  Condition: Good    Disposition: PACU - hemodynamically stable.    Attestation: I performed the procedure.

## 2020-07-02 NOTE — NURSING
Patient noted to have 12 beats of v-tach on cardiac monitor. Patient resting comfortably, denies symptoms.  Notified hospital medicine, no new orders at this time. Will continue to monitor.

## 2020-07-02 NOTE — PLAN OF CARE
Dr. Sidhu called to bedside to assess small amt of bloody drainage to newly inserted vas cath to rt IJ site accompanied by visible hematoma. Dr. Sidhu stating he expects swelling due to pt's INR of 3 and gave approval to use sandbag to help minimize/slow oozing.

## 2020-07-02 NOTE — TRANSFER OF CARE
"Anesthesia Transfer of Care Note    Patient: Isidro White Jr.    Procedure(s) Performed: Procedure(s) (LRB):  REMOVAL, GRAFT (Left)  INSERTION, CENTRAL VENOUS ACCESS DEVICE (Left)    Patient location: PACU    Anesthesia Type: MAC    Transport from OR: Transported from OR on room air with adequate spontaneous ventilation    Post pain: adequate analgesia    Post assessment: no apparent anesthetic complications    Post vital signs: stable    Level of consciousness: awake    Complications: none    Transfer of care protocol was followed      Last vitals:   Visit Vitals  /78 (BP Location: Right arm, Patient Position: Lying)   Pulse 75   Temp 37.2 °C (98.9 °F) (Oral)   Resp 16   Ht 6' 2" (1.88 m)   Wt 103 kg (227 lb 1.2 oz)   SpO2 (!) 94%   BMI 29.15 kg/m²     "

## 2020-07-02 NOTE — PLAN OF CARE
Met with patient completed initial assessment Patient is independent with adls and iadls.  Patient denies any post hospital needs or services at this time. Patient receives his HD tx at [facility] chair. He requested transportation resources, discussed [employer]. Printed application and given to patient.  Updated white board with 's name and number. Transitional Care Folder, Discharge Planning Begins on Admission pamphlet, [facility] Bedside Delivery pamphlet, Advance Directive information given to patient along with the contact information given.Instructed patient or family to call with any questions or concerns.           D/C Plan:home  PCP:MENDEL Garcia MD  [facility]  Discharge transportation:pov  [facility]:pending  Pharmacy Bedside Delivery: yes           07/02/20 1340   Discharge Assessment   Assessment Type Discharge Planning Assessment   Confirmed/corrected address and phone number on facesheet? Yes   Assessment information obtained from? Patient;Medical Record   Expected Length of Stay (days) 1   Communicated expected length of stay with patient/caregiver yes   Prior to hospitilization cognitive status: Alert/Oriented   Prior to hospitalization functional status: Independent   Current cognitive status: Alert/Oriented   Current Functional Status: Independent   Facility Arrived From: home   Lives With alone   Able to Return to Prior Arrangements yes   Is patient able to care for self after discharge? Yes   Who are your caregiver(s) and their phone number(s)? Maggie Hernandez ( sister ) [phone]   Patient's perception of discharge disposition home or selfcare   Readmission Within the Last 30 Days no previous admission in last 30 days   Patient currently being followed by outpatient case management? No   Patient currently receives any other outside agency services? No   Equipment Currently Used at Home none   Do you have any problems affording any of your  "prescribed medications? Yes   If yes, what medications? " heart rate medicine"   Is the patient taking medications as prescribed? yes   Does the patient have transportation home? Yes   Transportation Anticipated family or friend will provide   Dialysis Name and Scheduled days [facility]   Does the patient receive services at the Coumadin Clinic? No   Discharge Plan A Home with family   Patient/Family in Agreement with Plan yes     "

## 2020-07-02 NOTE — ASSESSMENT & PLAN NOTE
-Likely triggered by AV fistula infection  -Asymptomatic  -Continue home meds  -Labetalol 100 mg BID added

## 2020-07-03 PROBLEM — N18.9 ANEMIA ASSOCIATED WITH CHRONIC RENAL FAILURE: Status: ACTIVE | Noted: 2020-07-03

## 2020-07-03 PROBLEM — E87.8 DISORDER OF ELECTROLYTES: Status: ACTIVE | Noted: 2020-07-03

## 2020-07-03 PROBLEM — D63.1 ANEMIA ASSOCIATED WITH CHRONIC RENAL FAILURE: Status: ACTIVE | Noted: 2020-07-03

## 2020-07-03 LAB
ANION GAP SERPL CALC-SCNC: 16 MMOL/L (ref 8–16)
BASOPHILS # BLD AUTO: 0.01 K/UL (ref 0–0.2)
BASOPHILS NFR BLD: 0.1 % (ref 0–1.9)
BNP SERPL-MCNC: 333 PG/ML (ref 0–99)
BUN SERPL-MCNC: 74 MG/DL (ref 6–20)
CALCIUM SERPL-MCNC: 8.8 MG/DL (ref 8.7–10.5)
CHLORIDE SERPL-SCNC: 97 MMOL/L (ref 95–110)
CO2 SERPL-SCNC: 21 MMOL/L (ref 23–29)
CREAT SERPL-MCNC: 14.8 MG/DL (ref 0.5–1.4)
DIFFERENTIAL METHOD: ABNORMAL
EOSINOPHIL # BLD AUTO: 0 K/UL (ref 0–0.5)
EOSINOPHIL NFR BLD: 0 % (ref 0–8)
ERYTHROCYTE [DISTWIDTH] IN BLOOD BY AUTOMATED COUNT: 13 % (ref 11.5–14.5)
EST. GFR  (AFRICAN AMERICAN): 4 ML/MIN/1.73 M^2
EST. GFR  (NON AFRICAN AMERICAN): 3 ML/MIN/1.73 M^2
GLUCOSE SERPL-MCNC: 96 MG/DL (ref 70–110)
HCT VFR BLD AUTO: 30 % (ref 40–54)
HGB BLD-MCNC: 9.7 G/DL (ref 14–18)
IMM GRANULOCYTES # BLD AUTO: 0.06 K/UL (ref 0–0.04)
IMM GRANULOCYTES NFR BLD AUTO: 0.6 % (ref 0–0.5)
INR PPP: 2.8 (ref 0.8–1.2)
LYMPHOCYTES # BLD AUTO: 0.7 K/UL (ref 1–4.8)
LYMPHOCYTES NFR BLD: 7.4 % (ref 18–48)
MCH RBC QN AUTO: 34.2 PG (ref 27–31)
MCHC RBC AUTO-ENTMCNC: 32.3 G/DL (ref 32–36)
MCV RBC AUTO: 106 FL (ref 82–98)
MONOCYTES # BLD AUTO: 0.6 K/UL (ref 0.3–1)
MONOCYTES NFR BLD: 6.1 % (ref 4–15)
NEUTROPHILS # BLD AUTO: 8.5 K/UL (ref 1.8–7.7)
NEUTROPHILS NFR BLD: 85.8 % (ref 38–73)
NRBC BLD-RTO: 0 /100 WBC
PLATELET # BLD AUTO: 150 K/UL (ref 150–350)
PMV BLD AUTO: 11.2 FL (ref 9.2–12.9)
POTASSIUM SERPL-SCNC: 5.7 MMOL/L (ref 3.5–5.1)
PROTHROMBIN TIME: 28.6 SEC (ref 9–12.5)
RBC # BLD AUTO: 2.84 M/UL (ref 4.6–6.2)
SODIUM SERPL-SCNC: 134 MMOL/L (ref 136–145)
VANCOMYCIN SERPL-MCNC: 37.5 UG/ML
WBC # BLD AUTO: 9.95 K/UL (ref 3.9–12.7)

## 2020-07-03 PROCEDURE — 80202 ASSAY OF VANCOMYCIN: CPT

## 2020-07-03 PROCEDURE — 63600175 PHARM REV CODE 636 W HCPCS: Performed by: INTERNAL MEDICINE

## 2020-07-03 PROCEDURE — 36415 COLL VENOUS BLD VENIPUNCTURE: CPT

## 2020-07-03 PROCEDURE — 80048 BASIC METABOLIC PNL TOTAL CA: CPT

## 2020-07-03 PROCEDURE — 25000003 PHARM REV CODE 250: Performed by: NURSE PRACTITIONER

## 2020-07-03 PROCEDURE — 63600175 PHARM REV CODE 636 W HCPCS: Performed by: SURGERY

## 2020-07-03 PROCEDURE — 25000003 PHARM REV CODE 250: Performed by: SURGERY

## 2020-07-03 PROCEDURE — 99233 SBSQ HOSP IP/OBS HIGH 50: CPT | Mod: ,,, | Performed by: INTERNAL MEDICINE

## 2020-07-03 PROCEDURE — 80100014 HC HEMODIALYSIS 1:1

## 2020-07-03 PROCEDURE — 93005 ELECTROCARDIOGRAM TRACING: CPT

## 2020-07-03 PROCEDURE — 85610 PROTHROMBIN TIME: CPT

## 2020-07-03 PROCEDURE — 25000003 PHARM REV CODE 250: Performed by: INTERNAL MEDICINE

## 2020-07-03 PROCEDURE — 93010 ELECTROCARDIOGRAM REPORT: CPT | Mod: ,,, | Performed by: INTERNAL MEDICINE

## 2020-07-03 PROCEDURE — 21400001 HC TELEMETRY ROOM

## 2020-07-03 PROCEDURE — 11000001 HC ACUTE MED/SURG PRIVATE ROOM

## 2020-07-03 PROCEDURE — 85025 COMPLETE CBC W/AUTO DIFF WBC: CPT

## 2020-07-03 PROCEDURE — 83880 ASSAY OF NATRIURETIC PEPTIDE: CPT

## 2020-07-03 PROCEDURE — 99233 PR SUBSEQUENT HOSPITAL CARE,LEVL III: ICD-10-PCS | Mod: ,,, | Performed by: INTERNAL MEDICINE

## 2020-07-03 PROCEDURE — 93010 EKG 12-LEAD: ICD-10-PCS | Mod: ,,, | Performed by: INTERNAL MEDICINE

## 2020-07-03 RX ORDER — LABETALOL 100 MG/1
100 TABLET, FILM COATED ORAL ONCE
Status: COMPLETED | OUTPATIENT
Start: 2020-07-03 | End: 2020-07-03

## 2020-07-03 RX ORDER — LABETALOL 100 MG/1
200 TABLET, FILM COATED ORAL 2 TIMES DAILY
Status: DISCONTINUED | OUTPATIENT
Start: 2020-07-03 | End: 2020-07-03

## 2020-07-03 RX ADMIN — HYDROCODONE BITARTRATE AND ACETAMINOPHEN 1 TABLET: 5; 325 TABLET ORAL at 12:07

## 2020-07-03 RX ADMIN — HYDRALAZINE HYDROCHLORIDE 100 MG: 50 TABLET, FILM COATED ORAL at 09:07

## 2020-07-03 RX ADMIN — WARFARIN SODIUM 7.5 MG: 2.5 TABLET ORAL at 04:07

## 2020-07-03 RX ADMIN — DILTIAZEM HYDROCHLORIDE 360 MG: 180 CAPSULE, COATED, EXTENDED RELEASE ORAL at 08:07

## 2020-07-03 RX ADMIN — HYDROMORPHONE HYDROCHLORIDE 0.5 MG: 1 INJECTION, SOLUTION INTRAMUSCULAR; INTRAVENOUS; SUBCUTANEOUS at 06:07

## 2020-07-03 RX ADMIN — PANTOPRAZOLE SODIUM 40 MG: 40 TABLET, DELAYED RELEASE ORAL at 08:07

## 2020-07-03 RX ADMIN — HYDRALAZINE HYDROCHLORIDE 100 MG: 50 TABLET, FILM COATED ORAL at 06:07

## 2020-07-03 RX ADMIN — HYDROMORPHONE HYDROCHLORIDE 0.5 MG: 1 INJECTION, SOLUTION INTRAMUSCULAR; INTRAVENOUS; SUBCUTANEOUS at 02:07

## 2020-07-03 RX ADMIN — CALCIUM ACETATE 667 MG: 667 CAPSULE ORAL at 04:07

## 2020-07-03 RX ADMIN — CEFEPIME HYDROCHLORIDE 2 G: 2 INJECTION, SOLUTION INTRAVENOUS at 02:07

## 2020-07-03 RX ADMIN — HYDRALAZINE HYDROCHLORIDE 100 MG: 50 TABLET, FILM COATED ORAL at 02:07

## 2020-07-03 RX ADMIN — B-COMPLEX W/ C & FOLIC ACID TAB 1 MG 1 TABLET: 1 TAB at 08:07

## 2020-07-03 RX ADMIN — LISINOPRIL 40 MG: 20 TABLET ORAL at 08:07

## 2020-07-03 RX ADMIN — EPOETIN ALFA-EPBX 10000 UNITS: 10000 INJECTION, SOLUTION INTRAVENOUS; SUBCUTANEOUS at 11:07

## 2020-07-03 RX ADMIN — LABETALOL HYDROCHLORIDE 100 MG: 100 TABLET, FILM COATED ORAL at 02:07

## 2020-07-03 RX ADMIN — HYDROMORPHONE HYDROCHLORIDE 0.5 MG: 1 INJECTION, SOLUTION INTRAMUSCULAR; INTRAVENOUS; SUBCUTANEOUS at 12:07

## 2020-07-03 RX ADMIN — LABETALOL HYDROCHLORIDE 100 MG: 100 TABLET, FILM COATED ORAL at 08:07

## 2020-07-03 RX ADMIN — HYDROCODONE BITARTRATE AND ACETAMINOPHEN 1 TABLET: 5; 325 TABLET ORAL at 04:07

## 2020-07-03 RX ADMIN — AMLODIPINE BESYLATE 10 MG: 10 TABLET ORAL at 08:07

## 2020-07-03 RX ADMIN — HYDROXYZINE PAMOATE 25 MG: 25 CAPSULE ORAL at 08:07

## 2020-07-03 RX ADMIN — CALCIUM ACETATE 667 MG: 667 CAPSULE ORAL at 09:07

## 2020-07-03 RX ADMIN — HYDROCODONE BITARTRATE AND ACETAMINOPHEN 1 TABLET: 5; 325 TABLET ORAL at 11:07

## 2020-07-03 NOTE — PROGRESS NOTES
Pharmacy Coumadin Consult Note    INR=2.8 (down from 3.3 yesterday)   Hx of AVR/afib  Goal INR=2-3 (per coumadin clinic)    Home dose: warfarin 15mg Thurs and Sat, and 7.5 mg all other days  Continue home dose    Daily INR ordered  Pharmacy is consulted to dose  Patient needs to be educated     Pharmacy will monitor daily INR levels and make dosage adjustments when necessary.  Thank you for allowing us to participate in this patient's care.  Lina Pitt, Pharm D 7/3/2020 11:01 AM

## 2020-07-03 NOTE — PLAN OF CARE
Patient remained free from injury. PRN pain meds managed pain. Cardiac monitored. Hemodialysis. No s/s of acute distress. 12hr chart check complete.

## 2020-07-03 NOTE — PROGRESS NOTES
[facility] -   Nephrology  Progress Note    Patient Name: Isidro White Jr.  MRN: [id number]  Admission Date: 7/1/2020  Hospital Length of Stay: 1 days  Attending Provider: Nicolás Gerardo MD   Primary Care Physician: Miguel Campo MD (Inactive)  Principal Problem:Arteriovenous graft infection    Subjective:     HPI: No notes on file    Interval History:     7/3/20: patient is currently on hemodialysis. He is tolerating the procedure well. Vitals are stable. Patient reports left arm pain.       Review of patient's allergies indicates:  No Known Allergies  Current Facility-Administered Medications   Medication Frequency    albuterol-ipratropium 2.5 mg-0.5 mg/3 mL nebulizer solution 3 mL Q6H PRN    amLODIPine tablet 10 mg Daily    calcium acetate(phosphat bind) capsule 667 mg TID    cefepime in dextrose 5 % IVPB 2 g Q24H    diltiaZEM 24 hr capsule 360 mg Daily    epoetin vikki-epbx injection 10,000 Units Once    epoetin vikki-epbx injection 10,000 Units Once    hydrALAZINE tablet 100 mg Q8H    HYDROcodone-acetaminophen 5-325 mg per tablet 1 tablet Q4H PRN    HYDROmorphone injection 0.5 mg Q4H PRN    hydrOXYzine pamoate capsule 25 mg Q8H PRN    labetaloL tablet 100 mg Q12H    lisinopriL tablet 40 mg Daily    morphine injection 6 mg Q4H PRN    nozaseptin (NOZIN) nasal  BID    ondansetron injection 4 mg Q12H PRN    pantoprazole EC tablet 40 mg Daily    sodium chloride 0.9% flush 10 mL PRN    tenofovir tablet 300 mg Weekly    vancomycin - pharmacy to dose pharmacy to manage frequency    vit b cmplx 3-fa-vit c-biotin 1- mg-mg-mcg per tablet 1 tablet Daily    warfarin (COUMADIN) tablet 15 mg Every Thurs    [START ON 7/4/2020] warfarin (COUMADIN) tablet 15 mg Every Sat    warfarin tablet 7.5 mg Every Mon, Wed, Fri, Sun    [START ON 7/7/2020] warfarin tablet 7.5 mg Every Tues     Facility-Administered Medications Ordered in Other Encounters   Medication  Frequency    0.9%  NaCl infusion Continuous    sodium chloride 0.9% flush 10 mL PRN       Objective:     Vital Signs (Most Recent):  Temp: (P) 97.8 °F (36.6 °C) (07/03/20 0915)  Pulse: (P) 81 (07/03/20 1040)  Resp: (P) 16 (07/03/20 0915)  BP: (P) 126/77 (07/03/20 1040)  SpO2: (P) 97 % (07/03/20 0915)  O2 Device (Oxygen Therapy): (P) room air (07/03/20 0915) Vital Signs (24h Range):  Temp:  [97 °F (36.1 °C)-98.9 °F (37.2 °C)] (P) 97.8 °F (36.6 °C)  Pulse:  [62-96] (P) 81  Resp:  [10-38] (P) 16  SpO2:  [86 %-98 %] (P) 97 %  BP: (108-162)/(42-88) (P) 126/77     Weight: 103 kg (227 lb 1.2 oz) (07/01/20 1436)  Body mass index is 29.15 kg/m².  Body surface area is 2.32 meters squared.    I/O last 3 completed shifts:  In: 690 [P.O.:440; I.V.:250]  Out: -     Physical Exam  Constitutional:       Appearance: He is well-developed.   HENT:      Head: Normocephalic.      Nose: No rhinorrhea.      Mouth/Throat:      Pharynx: No oropharyngeal exudate.   Eyes:      Pupils: Pupils are equal, round, and reactive to light.   Neck:      Thyroid: No thyroid mass.   Cardiovascular:      Rate and Rhythm: Normal rate and regular rhythm.      Heart sounds: S1 normal and S2 normal.   Pulmonary:      Effort: Pulmonary effort is normal. No respiratory distress.      Breath sounds: No wheezing.   Abdominal:      General: Bowel sounds are normal. There is no distension.      Palpations: Abdomen is soft.      Tenderness: There is no abdominal tenderness.      Hernia: No hernia is present.   Musculoskeletal:      Comments: Left arm dressed and wrapped.    Lymphadenopathy:      Cervical: No cervical adenopathy.   Skin:     General: Skin is warm and dry.   Neurological:      Mental Status: He is alert and oriented to person, place, and time.   Psychiatric:         Behavior: Behavior is cooperative.         Significant Labs:  Lab Results   Component Value Date    CREATININE 14.8 (H) 07/03/2020    BUN 74 (H) 07/03/2020     (L) 07/03/2020    K  5.7 (H) 07/03/2020    CL 97 07/03/2020    CO2 21 (L) 07/03/2020     Lab Results   Component Value Date    .0 (H) 01/08/2019    CALCIUM 8.8 07/03/2020    PHOS 4.4 05/01/2019     Lab Results   Component Value Date    ALBUMIN 3.4 (L) 07/01/2020     Lab Results   Component Value Date    WBC 9.95 07/03/2020    HGB 9.7 (L) 07/03/2020    HCT 30.0 (L) 07/03/2020     (H) 07/03/2020     07/03/2020       No results for input(s): MG in the last 168 hours.      Significant Imaging:  Imaging Results    None           Assessment/Plan:     * Arteriovenous graft infection  Patient presented with infected left arm AVG which was removed on 7/2/20. Continue antibiotics.     Anemia associated with chronic renal failure  Epogen with HD.     Disorder of electrolytes  Hyperkalemia: will use 2 K bath with dialysis.    Will recheck current phos level.     ESRD (end stage renal disease) on dialysis  Patient on MWF schedule at [facility].     HD today.    Access: right IJ vascath. Infected left AVG was removed on 7/2/20.     Essential hypertension  Good BP control at present.         Thank you for your consult. I will follow-up with patient. Please contact us if you have any additional questions.    Jacek Ralph MD  Nephrology  [facility]

## 2020-07-03 NOTE — SUBJECTIVE & OBJECTIVE
Interval History:  Tolerating hemodialysis post-op form revision left arm AV fistula and placement of right IJV vascular catheter.  Endorsed some cramping na dnumbness left hand.    Review of Systems   Constitutional: Positive for fatigue. Negative for chills and fever.   HENT: Negative.  Negative for congestion, dental problem, rhinorrhea and sore throat.    Eyes: Negative.  Negative for visual disturbance.   Respiratory: Negative.  Negative for cough, shortness of breath and wheezing.    Cardiovascular: Negative.  Negative for chest pain, palpitations and leg swelling.   Gastrointestinal: Negative for abdominal pain, diarrhea, nausea and vomiting.   Endocrine: Negative.  Negative for cold intolerance and heat intolerance.   Genitourinary: Negative.  Negative for dysuria and hematuria.   Musculoskeletal: Positive for arthralgias. Negative for gait problem and myalgias.   Skin: Negative.  Negative for rash and wound.   Allergic/Immunologic: Negative.    Neurological: Negative.  Negative for dizziness, syncope, weakness, numbness and headaches.   Hematological: Negative.  Negative for adenopathy.   Psychiatric/Behavioral: Negative.  Negative for confusion and dysphoric mood.   All other systems reviewed and are negative.    Objective:     Vital Signs (Most Recent):  Temp: 97.8 °F (36.6 °C) (07/02/20 2114)  Pulse: 83 (07/02/20 2114)  Resp: 18 (07/02/20 2114)  BP: (!) 153/81 (07/02/20 2114)  SpO2: (!) 94 % (07/02/20 2114) Vital Signs (24h Range):  Temp:  [97 °F (36.1 °C)-98.9 °F (37.2 °C)] 97.8 °F (36.6 °C)  Pulse:  [62-96] 83  Resp:  [10-38] 18  SpO2:  [86 %-95 %] 94 %  BP: (109-173)/(42-88) 153/81     Weight: 103 kg (227 lb 1.2 oz)  Body mass index is 29.15 kg/m².    Intake/Output Summary (Last 24 hours) at 7/2/2020 2123  Last data filed at 7/2/2020 1615  Gross per 24 hour   Intake 250 ml   Output --   Net 250 ml      Physical Exam  Constitutional:       General: He is not in acute distress.     Appearance: He is  well-developed. He is not diaphoretic.   HENT:      Head: Normocephalic and atraumatic.   Eyes:      Conjunctiva/sclera: Conjunctivae normal.      Pupils: Pupils are equal, round, and reactive to light.   Neck:      Thyroid: No thyromegaly.      Vascular: No JVD.   Cardiovascular:      Rate and Rhythm: Normal rate and regular rhythm.      Heart sounds: Normal heart sounds. No murmur. No friction rub. No gallop.       Comments: Right IJV vascular catheter in place and functional.  Pulmonary:      Effort: Pulmonary effort is normal. No respiratory distress.      Breath sounds: Normal breath sounds. No wheezing or rales.   Abdominal:      General: Bowel sounds are normal. There is no distension.      Palpations: Abdomen is soft.      Tenderness: There is no abdominal tenderness. There is no guarding or rebound.   Musculoskeletal: Normal range of motion.         General: No tenderness.      Comments: Left arm dressed and ace wrapped.  Distal radial pulse 1+.  Normal sensation and ROM left hand.   Lymphadenopathy:      Cervical: No cervical adenopathy.   Skin:     General: Skin is warm and dry.      Findings: No erythema or rash.   Neurological:      Mental Status: He is alert and oriented to person, place, and time.      Cranial Nerves: No cranial nerve deficit.      Deep Tendon Reflexes: Reflexes are normal and symmetric. Reflexes normal.   Psychiatric:         Behavior: Behavior normal.         Thought Content: Thought content normal.         Judgment: Judgment normal.         Significant Labs: All pertinent labs within the past 24 hours have been reviewed.    Significant Imaging: I have reviewed all pertinent imaging results/findings within the past 24 hours.

## 2020-07-03 NOTE — PROGRESS NOTES
[facility]  Cardiology  Progress Note    Patient Name: Isidro White Jr.  MRN: [id number]  Admission Date: 7/1/2020  Hospital Length of Stay: 1 days  Code Status: Full Code   Attending Physician: Nicolás Gerardo MD   Primary Care Physician: Miguel Campo MD (Inactive)  Expected Discharge Date:   Principal Problem:Arteriovenous graft infection    Subjective:     Hospital Course:   Mr. White is a 50 year old male aptient whose current medical conditions include ASD (s/p closure), HFpEF, s/p AVR (mechanical) and TV annuloplasty), HTN, PHTN, SVT (s/p RFA 3/17), and ESRD on HD who presented to [facility] ED yesterday with a chief complaint of bleeding from his L arm shunt. Associated symptoms included dizziness and diaphoresis. Patient denied any associated iesha chest pain, fever, chills, palpitations, near syncope, or syncope. While in ED, patient continued to have active bleeding which required suture placement and compression dressing. Initial workup revealed H/H of 10.8/32.0 and patient was subsequently admitted for further evaluation and treatment. Overnight, patient reportedly had runs of NSVT and cardiology consulted to assist with management. Patient seen and examined today, F. Remains chest pain free. He reports compliance with his medications. On long term AC (Coumadin) due to history of mechanical AVR. Chart reviewed. H/H has remained stable. INR 3.2 yesterday. Echo 3/20 showed normal EF. Stress 3/20 negative for ischemia. Vascular surgery on board and planning on access removal and Vas-cath placement.        Reviewed telemetry strips---NSVT confirmed.    7/3/20  NSVT occurred more freq today. Increased Labetolol to 200mg BID, continue on Cardizem 360. No CP, occ palpitations. Getting HD today.     Past Medical History:   Diagnosis Date    Aortic valve stenosis     s/p mechanical AVR    Atrial fibrillation     Atrial flutter     Cardiomyopathy     CHF (congestive heart failure)      Drug-induced erectile dysfunction     ESRD due to hypertension     HD M, W, F    ESRD on dialysis     Hepatitis B     Hyperlipidemia     Hypertension     DANIEL (obstructive sleep apnea) 11/12/2019    Secondary hyperparathyroidism of renal origin     Supraventricular tachycardia     atrial tachycardia    Tachycardia     Valvular regurgitation     AI, TR       Past Surgical History:   Procedure Laterality Date    ASD REPAIR      age 8 [facility]    AV Graft Creation Left 03/2017    [facility]AC [facility]TERIZATION      [facility]     CARDIAC VALVE SURGERY  02/08/2017    22 mm Medtronic AV, 28 mm TV Medtronic annuloplaty ring    COLONOSCOPY N/A 8/27/2019    Procedure: COLONOSCOPY;  Surgeon: Addie John MD;  Location: [facility] ENDO;  Service: Endoscopy;  Laterality: N/A;  dialysis pt *needs K*    ESOPHAGOGASTRODUODENOSCOPY N/A 8/27/2019    Procedure: EGD (ESOPHAGOGASTRODUODENOSCOPY);  Surgeon: Addie John MD;  Location: [facility] ENDO;  Service: Endoscopy;  Laterality: N/A;    RADIOFREQUENCY ABLATION  03/13/2017    Atrial tachycardia       Review of patient's allergies indicates:  No Known Allergies    Current Facility-Administered Medications on File Prior to Encounter   Medication    0.9%  NaCl infusion    sodium chloride 0.9% flush 10 mL     Current Outpatient Medications on File Prior to Encounter   Medication Sig    labetalol (NORMODYNE) 100 MG tablet TAKE ONE TABLET BY MOUTH THREE TIMES DAILY AS NEEDED FOR HEART RATE > 120    albuterol (VENTOLIN HFA) 90 mcg/actuation inhaler Inhale 2 puffs into the lungs every 6 (six) hours as needed for Wheezing or Shortness of Breath. Rescue    amLODIPine (NORVASC) 10 MG tablet TAKE ONE TABLET BY MOUTH EVERY DAY    amoxicillin (AMOXIL) 500 MG capsule Take 1 capsule (500 mg total) by mouth 3 (three) times daily x 14 days    ascorbic acid, vitamin C, (VITAMIN C) 500 MG tablet Take 1 tablet (500 mg total) by mouth 2 (two) times daily.    b complex  vitamins tablet Take 1 tablet by mouth once daily.    calcium acetate (PHOSLO) 667 mg capsule Take 1 capsule (667 mg total) by mouth 3 (three) times daily.    diltiaZEM (CARDIZEM CD) 360 MG 24 hr capsule Take 1 capsule (360 mg total) by mouth once daily.    enoxaparin (LOVENOX) 100 mg/mL Syrg Inject 0.9 mLs (90 mg total) into the skin once daily.    epoetin vikki (PROCRIT) 10,000 unit/mL injection Inject 1 mL (10,000 Units total) into the skin every Mon, Wed, Fri. To be given with HD    fish oil-omega-3 fatty acids 300-1,000 mg capsule Take 2 g by mouth once daily.    hydrALAZINE (APRESOLINE) 100 MG tablet TAKE ONE TABLET BY MOUTH THREE TIMES DAILY INCLUDING DIALYSIS DAYS    hydrOXYzine pamoate (VISTARIL) 25 MG Cap T1C PO QD PRN ITCHING    lisinopril (PRINIVIL,ZESTRIL) 40 MG tablet TAKE ONE TABLET BY MOUTH EVERY DAY    multivitamin with minerals tablet Take 1 tablet by mouth once daily.    omega-3 fatty acids-vitamin E (FISH OIL) 1,000 mg Cap Take 1 capsule by mouth once daily.    pantoprazole (PROTONIX) 40 MG tablet TAKE ONE TABLET BY MOUTH EVERY DAY    ETHAN-RACQUEL RX 1- mg-mg-mcg Tab TAKE ONE TABLET BY MOUTH EVERY DAY    tenofovir (VIREAD) 300 mg Tab Take 1 tablet (300 mg total) by mouth once a week.    vitamin D (VITAMIN D3) 1000 units Tab Take 1,000 Units by mouth 3 (three) times a week.    warfarin (COUMADIN) 7.5 MG tablet Take 2 tablets by mouth on Thursdays and 1 tablet on all other days of the week. (Patient taking differently: Take 2 tablets by mouth on Thursdays and Saturday; and 1 tablet on all other days of the week.)     Family History     Problem Relation (Age of Onset)    Anesthesia problems Paternal Uncle    Diabetes Paternal Aunt, Paternal Aunt    Heart attack Father    Heart failure Paternal Grandmother    Hypertension Paternal Aunt    Leukemia Mother, Paternal Aunt    No Known Problems Sister, Brother, Sister, Sister    Stroke Paternal Aunt    Suicide Paternal Uncle    Valvular  heart disease Maternal Aunt        Tobacco Use    Smoking status: Never Smoker    Smokeless tobacco: Never Used   Substance and Sexual Activity    Alcohol use: No     Frequency: Never     Comment: quit in 2016; does have heavy drinking history; started drinking at age 13; was drinking a 12 pack over the weekend and two 24 ounces of beer a day during the week along with a pint of hard alcohol    Drug use: No    Sexual activity: Not on file     Review of Systems   Constitution: Positive for malaise/fatigue.   HENT: Negative.    Eyes: Negative.    Cardiovascular: Negative.    Respiratory: Negative.    Endocrine: Negative.    Hematologic/Lymphatic: Bruises/bleeds easily.   Skin: Negative.    Musculoskeletal: Positive for joint swelling (left arm).   Gastrointestinal: Negative.    Genitourinary: Negative.    Neurological: Negative.    Psychiatric/Behavioral: Negative.    Allergic/Immunologic: Negative.      Objective:     Vital Signs (Most Recent):  Temp: 98.6 °F (37 °C) (07/03/20 1556)  Pulse: 81 (07/03/20 1556)  Resp: 18 (07/03/20 1556)  BP: 117/63 (07/03/20 1556)  SpO2: (!) 94 % (07/03/20 1556) Vital Signs (24h Range):  Temp:  [97 °F (36.1 °C)-98.6 °F (37 °C)] 98.6 °F (37 °C)  Pulse:  [62-96] 81  Resp:  [10-38] 18  SpO2:  [86 %-98 %] 94 %  BP: ()/(42-89) 117/63     Weight: 103 kg (227 lb 1.2 oz)  Body mass index is 29.15 kg/m².    SpO2: (!) 94 %  O2 Device (Oxygen Therapy): room air      Intake/Output Summary (Last 24 hours) at 7/3/2020 1616  Last data filed at 7/3/2020 1235  Gross per 24 hour   Intake 860 ml   Output 2500 ml   Net -1640 ml       Lines/Drains/Airways     Central Venous Catheter Line                 Hemodialysis Catheter 07/02/20 right internal jugular 1 day          Peripheral Intravenous Line                 Peripheral IV - Single Lumen 07/02/20 1623 18 G Right Antecubital less than 1 day                Physical Exam   Constitutional: He is oriented to person, place, and time. He appears  well-developed and well-nourished. No distress.   HENT:   Head: Normocephalic and atraumatic.   Eyes: Pupils are equal, round, and reactive to light. Right eye exhibits no discharge. Left eye exhibits no discharge.   Neck: Neck supple. No JVD present.   Cardiovascular: Normal rate, regular rhythm, S1 normal and S2 normal.   Murmur heard.  +mechanical click   Pulmonary/Chest: Effort normal and breath sounds normal. No respiratory distress. He has no wheezes. He has no rales.   Abdominal: Soft. He exhibits no distension.   Musculoskeletal:         General: No edema.   Neurological: He is alert and oriented to person, place, and time.   Skin: Skin is warm and dry. He is not diaphoretic. No erythema.   Left arm fistula wrapped/dressed    Arm warm to touch   Psychiatric: He has a normal mood and affect. His behavior is normal. Thought content normal.   Nursing note and vitals reviewed.      Significant Labs:   CMP   Recent Labs   Lab 07/02/20  0626 07/03/20  0940    134*   K 5.6* 5.7*   CL 96 97   CO2 17* 21*   GLU 87 96   BUN 86* 74*   CREATININE 17.8* 14.8*   CALCIUM 9.6 8.8   ANIONGAP 23* 16   ESTGFRAFRICA 3* 4*   EGFRNONAA 3* 3*   , CBC   Recent Labs   Lab 07/02/20  0626 07/03/20  0432   WBC 8.46 9.95   HGB 10.5* 9.7*   HCT 33.2* 30.0*   * 150   , Troponin No results for input(s): TROPONINI in the last 48 hours. and All pertinent lab results from the last 24 hours have been reviewed.    Significant Imaging: Echocardiogram:   2D echo with color flow doppler:   Results for orders placed or performed during the hospital encounter of 04/28/19   2D echo with color flow doppler   Result Value Ref Range    QEF 60 55 - 65    Diastolic Dysfunction Yes (A)     Est. PA Systolic Pressure 57.15 (A)     Tricuspid Valve Regurgitation MILD     Narrative    Date of Procedure: 05/01/2019        TEST DESCRIPTION       Aorta: The aortic root is normal in size, measuring 3.6 cm at sinotubular junction and 3.8 cm at Sinuses  of Valsalva. The proximal ascending aorta is normal in size, measuring 3.4 cm across.     Left Atrium: The left atrial volume index is mildly enlarged, measuring 36.75 cc/m2.     Left Ventricle: The left ventricle is normal in size, with an end-diastolic diameter of 4.1 cm, and an end-systolic diameter of 2.4 cm. LV wall thickness is normal, with the septum measuring 1.8 cm and the posterior wall measuring 1.7 cm across. Relative   wall thickness was increased at 0.83, and the LV mass index was increased at 173.5 g/m2 consistent with concentric left ventricular hypertrophy. There are no regional wall motion abnormalities. Left ventricular systolic function appears normal. Visually   estimated ejection fraction is 60-65%. The LV Doppler derived stroke volume equals 129.0 ccs.     Diastolic indices: E wave velocity 1.4 m/s, E/A ratio 1.3,  msec., E/e' ratio(avg) 20. There is pseudonormalization of mitral inflow pattern consistent with significant diastolic dysfunction.     Right Atrium: The right atrium is normal in size, measuring 5.8 cm in length and 4.0 cm in width in the apical view.     Right Ventricle: The right ventricle is normal in size. Global right ventricular systolic function appears normal. Tricuspid annular plane systolic excursion (TAPSE) is 1.7 cm. The estimated PA systolic pressure is greater than 57 mmHg.     Aortic Valve:  There is a mechanical prosthesis present in the aortic position. The peak velocity obtained across the aortic valve is 3.89 m/s, which translates to a peak gradient of 61 mmHg. The mean gradient is 31 mmHg. Using a left ventricular outflow   tract diameter of 2.2 cm, a left ventricular outflow tract velocity time integral of 34 cm, and a peak instantaneous transvalvular velocity time integral of 64 cm, the effective prosthetic valve area is 2.01 cm2(p AVAi is 0.94 cm2/m2).     Mitral Valve:  The pressure half time is 38 msec. The calculated mitral valve area is 5.79 cm2.  Mitral valve is normal in structure with normal leaflet mobility.     Tricuspid Valve:  There is mild tricuspid regurgitation. Tricuspid valve is normal in structure with normal leaflet mobility.     Pulmonary Valve:  Pulmonary valve is normal in structure with normal leaflet mobility.     Intracavitary: There is no evidence of pericardial effusion, intracavity mass, thrombi, or vegetation.         CONCLUSIONS     1 - Mild left atrial enlargement.     2 - Concentric hypertrophy.     3 - No wall motion abnormalities.     4 - Normal left ventricular systolic function (EF 60-65%).     5 - Impaired LV relaxation, elevated LAP (grade 2 diastolic dysfunction).     6 - Normal right ventricular systolic function .     7 - Pulmonary hypertension. The estimated PA systolic pressure is greater than 57 mmHg.     8 - Aortic valve prosthesis, JHOANA = 2.01 cm2, AVAi = 0.94 cm2/m2, peak velocity = 3.89 m/s, mean gradient = 31 mmHg.     9 - Mild tricuspid regurgitation.             This document has been electronically    SIGNED BY: Shai Calderon MD On: 05/01/2019 15:34   , EKG: Reviewed and X-Ray: CXR: X-Ray Chest 1 View (CXR): No results found for this visit on 07/01/20. and X-Ray Chest PA and Lateral (CXR): No results found for this visit on 07/01/20.    Assessment and Plan:     Brief HPI: see above     * Arteriovenous graft infection  -Mgmt as per primary team  -Vascular surgery on board, plans for AV graft removal    NSVT (nonsustained ventricular tachycardia)  -Likely triggered by AV fistula infection  -Asymptomatic  -Continue home meds  -Labetalol 100 mg BID added --increased to 200 BID  Replete K and Mg with HD    ESRD (end stage renal disease) on dialysis  -Mgmt as per nephrology    S/P AVR (aortic valve replacement)  -History of mechanical AVR  -INR 3.2 --> 2.8  -Bridge with heparin gtt if INR < 2.5; pharmacy consulted     Essential hypertension  -Continue home meds  -Montior        VTE Risk Mitigation (From admission,  onward)         Ordered     warfarin tablet 7.5 mg  Every Tuesday 07/02/20 1050     warfarin (COUMADIN) tablet 15 mg  Every Saturday 07/02/20 1050     warfarin tablet 7.5 mg  Every Mon, Wed, Fri, Sun      07/02/20 1050     warfarin (COUMADIN) tablet 15 mg  Every Thursday 07/02/20 1050     IP VTE HIGH RISK PATIENT  Once      07/01/20 1340     Place sequential compression device  Until discontinued      07/01/20 1340                Fuad Gudino Md, MD  Cardiology  [facility]

## 2020-07-03 NOTE — HOSPITAL COURSE
Mr. White is a 50 year old male aptient whose current medical conditions include ASD (s/p closure), HFpEF, s/p AVR (mechanical) and TV annuloplasty), HTN, PHTN, SVT (s/p RFA 3/17), and ESRD on HD who presented to [facility] ED yesterday with a chief complaint of bleeding from his L arm shunt. Associated symptoms included dizziness and diaphoresis. Patient denied any associated iesha chest pain, fever, chills, palpitations, near syncope, or syncope. While in ED, patient continued to have active bleeding which required suture placement and compression dressing. Initial workup revealed H/H of 10.8/32.0 and patient was subsequently admitted for further evaluation and treatment. Overnight, patient reportedly had runs of NSVT and cardiology consulted to assist with management. Patient seen and examined today, F. Remains chest pain free. He reports compliance with his medications. On long term AC (Coumadin) due to history of mechanical AVR. Chart reviewed. H/H has remained stable. INR 3.2 yesterday. Echo 3/20 showed normal EF. Stress 3/20 negative for ischemia. Vascular surgery on board and planning on access removal and Vas-cath placement.        Reviewed telemetry strips---NSVT confirmed.    7/3/20  NSVT occurred more freq today. Increased Labetolol to 200mg BID, continue on Cardizem 360. No CP, occ palpitations. Getting HD today.     7/4/20  Had more oozing from L arm AVG site with drop in H&H - 7.7, INR 2.1; discussed starting heparin drip and coumadin but on hold due to bleeding, will continue to monitor but discussed if INR < 2.0 will need to be on heparin bridge while INR becomes therapeutic. No CP, NSVT improved with increase BB.     7/5/20 - L arm AVG dressing changed last night, Hbg 6.9 getting transfused today, INR 2.0, heparin and coumadin off now, will restart tomorrow and follow up with coumadin clinic. No CP/SOB.

## 2020-07-03 NOTE — PROGRESS NOTES
Pt received 3 hrs of Dialysis, tolerated well . Net UF is 2000 ml. No access issues. Dr. Ralph visited during tx.

## 2020-07-03 NOTE — PROGRESS NOTES
[facility] Hospital Medicine  Progress Note    Patient Name: Isidro White Jr.  MRN: [id number]  Patient Class: IP- Inpatient   Admission Date: 7/1/2020  Length of Stay: 0 days  Attending Physician: Nicolás Gerardo MD  Primary Care Provider: Miguel Campo MD (Inactive)        Subjective:     Principal Problem:Arteriovenous graft infection        HPI:  Isidro White Jr. is a 50 y.o. male patient with a PMHx of Aortic valve stenosis, A-fib, CHF, ESRD, HTN,  who presents to the Emergency Department for evaluation of shunt problem which onset suddenly this Am. Pt states that he just began bleeding for no reason from his L arm shunt. Associated sxs include diaphoresis and dizzy. Patient denies any fever, chills, sore throat, cough, n/v/d, CP, SOB, HA, syncope, weakness, and all other sxs at this time. Pt states that he is on Coumadin at this time and is a MWF dialysis pt. In the ED, pt acutely bleeding and was given 2 units of emergent blood. ED physician placed several sutures and applied compression dressing. Bleed was then controlled. Dr Gleason (vascular surgery) was consulted in the ED, plans to remove access and place vascath tomorrow in OR. Labs unremarkable. Patient placed in observation for infected graft and further monitoring.       Overview/Hospital Course:  No notes on file    Interval History:  Tolerating hemodialysis post-op form revision left arm AV fistula and placement of right IJV vascular catheter.  Endorsed some cramping na dnumbness left hand.    Review of Systems   Constitutional: Positive for fatigue. Negative for chills and fever.   HENT: Negative.  Negative for congestion, dental problem, rhinorrhea and sore throat.    Eyes: Negative.  Negative for visual disturbance.   Respiratory: Negative.  Negative for cough, shortness of breath and wheezing.    Cardiovascular: Negative.  Negative for chest pain, palpitations and leg swelling.   Gastrointestinal: Negative for  abdominal pain, diarrhea, nausea and vomiting.   Endocrine: Negative.  Negative for cold intolerance and heat intolerance.   Genitourinary: Negative.  Negative for dysuria and hematuria.   Musculoskeletal: Positive for arthralgias. Negative for gait problem and myalgias.   Skin: Negative.  Negative for rash and wound.   Allergic/Immunologic: Negative.    Neurological: Negative.  Negative for dizziness, syncope, weakness, numbness and headaches.   Hematological: Negative.  Negative for adenopathy.   Psychiatric/Behavioral: Negative.  Negative for confusion and dysphoric mood.   All other systems reviewed and are negative.    Objective:     Vital Signs (Most Recent):  Temp: 97.8 °F (36.6 °C) (07/02/20 2114)  Pulse: 83 (07/02/20 2114)  Resp: 18 (07/02/20 2114)  BP: (!) 153/81 (07/02/20 2114)  SpO2: (!) 94 % (07/02/20 2114) Vital Signs (24h Range):  Temp:  [97 °F (36.1 °C)-98.9 °F (37.2 °C)] 97.8 °F (36.6 °C)  Pulse:  [62-96] 83  Resp:  [10-38] 18  SpO2:  [86 %-95 %] 94 %  BP: (109-173)/(42-88) 153/81     Weight: 103 kg (227 lb 1.2 oz)  Body mass index is 29.15 kg/m².    Intake/Output Summary (Last 24 hours) at 7/2/2020 2123  Last data filed at 7/2/2020 1615  Gross per 24 hour   Intake 250 ml   Output --   Net 250 ml      Physical Exam  Constitutional:       General: He is not in acute distress.     Appearance: He is well-developed. He is not diaphoretic.   HENT:      Head: Normocephalic and atraumatic.   Eyes:      Conjunctiva/sclera: Conjunctivae normal.      Pupils: Pupils are equal, round, and reactive to light.   Neck:      Thyroid: No thyromegaly.      Vascular: No JVD.   Cardiovascular:      Rate and Rhythm: Normal rate and regular rhythm.      Heart sounds: Normal heart sounds. No murmur. No friction rub. No gallop.       Comments: Right IJV vascular catheter in place and functional.  Pulmonary:      Effort: Pulmonary effort is normal. No respiratory distress.      Breath sounds: Normal breath sounds. No  wheezing or rales.   Abdominal:      General: Bowel sounds are normal. There is no distension.      Palpations: Abdomen is soft.      Tenderness: There is no abdominal tenderness. There is no guarding or rebound.   Musculoskeletal: Normal range of motion.         General: No tenderness.      Comments: Left arm dressed and ace wrapped.  Distal radial pulse 1+.  Normal sensation and ROM left hand.   Lymphadenopathy:      Cervical: No cervical adenopathy.   Skin:     General: Skin is warm and dry.      Findings: No erythema or rash.   Neurological:      Mental Status: He is alert and oriented to person, place, and time.      Cranial Nerves: No cranial nerve deficit.      Deep Tendon Reflexes: Reflexes are normal and symmetric. Reflexes normal.   Psychiatric:         Behavior: Behavior normal.         Thought Content: Thought content normal.         Judgment: Judgment normal.         Significant Labs: All pertinent labs within the past 24 hours have been reviewed.    Significant Imaging: I have reviewed all pertinent imaging results/findings within the past 24 hours.      Assessment/Plan:      * Arteriovenous graft infection  Bleeding has stopped  H/H stable  Repeat CBC pending   Follow blood cx  IV vancomycin and cefepime   Dr Gleason on board.  Fistula removal and Vascular Catheter placement 02 July with resumption of hemodialysis.    Atrial fibrillation  Coumadin on hold due to acute blood loss and sx planned for tomorrow   Heart rate controlled        ESRD (end stage renal disease) on dialysis  HD on M/W/F   Nephrology consulted for HD management       Essential hypertension  Continue home meds   Monitor         VTE Risk Mitigation (From admission, onward)         Ordered     warfarin tablet 7.5 mg  Every Tuesday 07/02/20 1050     warfarin (COUMADIN) tablet 15 mg  Every Saturday 07/02/20 1050     warfarin tablet 7.5 mg  Every Mon, Wed, Fri, Sun      07/02/20 1050     warfarin (COUMADIN) tablet 15 mg  Every  Thursday 07/02/20 1050     IP VTE HIGH RISK PATIENT  Once      07/01/20 1340     Place sequential compression device  Until discontinued      07/01/20 1340                      Nicolás Gerardo MD  Department of Hospital Medicine   [facility]

## 2020-07-03 NOTE — ASSESSMENT & PLAN NOTE
-Likely triggered by AV fistula infection  -Asymptomatic  -Continue home meds  -Labetalol 100 mg BID added --increased to 200 BID  Replete K and Mg with HD

## 2020-07-03 NOTE — SUBJECTIVE & OBJECTIVE
Interval History:     7/3/20: patient is currently on hemodialysis. He is tolerating the procedure well. Vitals are stable. Patient reports left arm pain.       Review of patient's allergies indicates:  No Known Allergies  Current Facility-Administered Medications   Medication Frequency    albuterol-ipratropium 2.5 mg-0.5 mg/3 mL nebulizer solution 3 mL Q6H PRN    amLODIPine tablet 10 mg Daily    calcium acetate(phosphat bind) capsule 667 mg TID    cefepime in dextrose 5 % IVPB 2 g Q24H    diltiaZEM 24 hr capsule 360 mg Daily    epoetin vikki-epbx injection 10,000 Units Once    epoetin vikki-epbx injection 10,000 Units Once    hydrALAZINE tablet 100 mg Q8H    HYDROcodone-acetaminophen 5-325 mg per tablet 1 tablet Q4H PRN    HYDROmorphone injection 0.5 mg Q4H PRN    hydrOXYzine pamoate capsule 25 mg Q8H PRN    labetaloL tablet 100 mg Q12H    lisinopriL tablet 40 mg Daily    morphine injection 6 mg Q4H PRN    nozaseptin (NOZIN) nasal  BID    ondansetron injection 4 mg Q12H PRN    pantoprazole EC tablet 40 mg Daily    sodium chloride 0.9% flush 10 mL PRN    tenofovir tablet 300 mg Weekly    vancomycin - pharmacy to dose pharmacy to manage frequency    vit b cmplx 3-fa-vit c-biotin 1- mg-mg-mcg per tablet 1 tablet Daily    warfarin (COUMADIN) tablet 15 mg Every Thurs    [START ON 7/4/2020] warfarin (COUMADIN) tablet 15 mg Every Sat    warfarin tablet 7.5 mg Every Mon, Wed, Fri, Sun    [START ON 7/7/2020] warfarin tablet 7.5 mg Every Tues     Facility-Administered Medications Ordered in Other Encounters   Medication Frequency    0.9%  NaCl infusion Continuous    sodium chloride 0.9% flush 10 mL PRN       Objective:     Vital Signs (Most Recent):  Temp: (P) 97.8 °F (36.6 °C) (07/03/20 0915)  Pulse: (P) 81 (07/03/20 1040)  Resp: (P) 16 (07/03/20 0915)  BP: (P) 126/77 (07/03/20 1040)  SpO2: (P) 97 % (07/03/20 0915)  O2 Device (Oxygen Therapy): (P) room air (07/03/20 0915) Vital Signs  (24h Range):  Temp:  [97 °F (36.1 °C)-98.9 °F (37.2 °C)] (P) 97.8 °F (36.6 °C)  Pulse:  [62-96] (P) 81  Resp:  [10-38] (P) 16  SpO2:  [86 %-98 %] (P) 97 %  BP: (108-162)/(42-88) (P) 126/77     Weight: 103 kg (227 lb 1.2 oz) (07/01/20 1436)  Body mass index is 29.15 kg/m².  Body surface area is 2.32 meters squared.    I/O last 3 completed shifts:  In: 690 [P.O.:440; I.V.:250]  Out: -     Physical Exam  Constitutional:       Appearance: He is well-developed.   HENT:      Head: Normocephalic.      Nose: No rhinorrhea.      Mouth/Throat:      Pharynx: No oropharyngeal exudate.   Eyes:      Pupils: Pupils are equal, round, and reactive to light.   Neck:      Thyroid: No thyroid mass.   Cardiovascular:      Rate and Rhythm: Normal rate and regular rhythm.      Heart sounds: S1 normal and S2 normal.   Pulmonary:      Effort: Pulmonary effort is normal. No respiratory distress.      Breath sounds: No wheezing.   Abdominal:      General: Bowel sounds are normal. There is no distension.      Palpations: Abdomen is soft.      Tenderness: There is no abdominal tenderness.      Hernia: No hernia is present.   Musculoskeletal:      Comments: Left arm dressed and wrapped.    Lymphadenopathy:      Cervical: No cervical adenopathy.   Skin:     General: Skin is warm and dry.   Neurological:      Mental Status: He is alert and oriented to person, place, and time.   Psychiatric:         Behavior: Behavior is cooperative.         Significant Labs:  Lab Results   Component Value Date    CREATININE 14.8 (H) 07/03/2020    BUN 74 (H) 07/03/2020     (L) 07/03/2020    K 5.7 (H) 07/03/2020    CL 97 07/03/2020    CO2 21 (L) 07/03/2020     Lab Results   Component Value Date    .0 (H) 01/08/2019    CALCIUM 8.8 07/03/2020    PHOS 4.4 05/01/2019     Lab Results   Component Value Date    ALBUMIN 3.4 (L) 07/01/2020     Lab Results   Component Value Date    WBC 9.95 07/03/2020    HGB 9.7 (L) 07/03/2020    HCT 30.0 (L) 07/03/2020    MCV  106 (H) 07/03/2020     07/03/2020       No results for input(s): MG in the last 168 hours.      Significant Imaging:  Imaging Results    None

## 2020-07-03 NOTE — NURSING
HD x 3 hours. UF 1250 mL. Goal of 3000 mL. BP dropped and pt became symptomatic (dizzy, cramping) multiple times during treatment - resulting in multiple drops in the UF goal. Pt reported symptoms resolved with drops in UF goal. Post HD pt has no complaints - BP normalized. Pressure dressing applied to new CVC site - recommend waiting until INR normalizes before removing. CVC also packed with 10mL NS per port until INR normalizes.

## 2020-07-03 NOTE — PLAN OF CARE
PRN pain meds adm as needed to control pain level. Bedside dialysis completed today, HR elevated in 160's randomly during dialysis. ACE wrap to LUE, C/D/I. Right chest vas cath, dressing is C/D/I. Heart monitor [id number]. Remains free from injury/incident. Call light in reach.

## 2020-07-03 NOTE — ASSESSMENT & PLAN NOTE
-History of mechanical AVR  -INR 3.2 --> 2.8  -Bridge with heparin gtt if INR < 2.5; pharmacy consulted

## 2020-07-03 NOTE — ASSESSMENT & PLAN NOTE
Initially had bleeding at a graft site.  Bleeding has stopped, H/H stable.  Dr Gleason on board.  Fistula removal and Vascular Catheter placement 02 July with resumption of hemodialysis.  Vascular surgery to re-evaluate the wound Saturday 04 July then may return home and follow up outpatient.  Blood cultures drawn and specimen culture of graft pending.  Continuing Vancomycin and Cefepime.

## 2020-07-03 NOTE — ASSESSMENT & PLAN NOTE
Patient on MWF schedule at [facility].     HD today.    Access: right IJ vascath. Infected left AVG was removed on 7/2/20.

## 2020-07-03 NOTE — PROGRESS NOTES
[facility] Hospital Medicine  Progress Note    Patient Name: Isidro White Jr.  MRN: [id number]  Patient Class: IP- Inpatient   Admission Date: 7/1/2020  Length of Stay: 1 days  Attending Physician: Nicolás Gerardo MD  Primary Care Provider: Miguel Campo MD (Inactive)        Subjective:     Principal Problem:Arteriovenous graft infection        HPI:  sIidro White Jr. is a 50 y.o. male patient with a PMHx of Aortic valve stenosis, A-fib, CHF, ESRD, HTN,  who presents to the Emergency Department for evaluation of shunt problem which onset suddenly this Am. Pt states that he just began bleeding for no reason from his L arm shunt. Associated sxs include diaphoresis and dizzy. Patient denies any fever, chills, sore throat, cough, n/v/d, CP, SOB, HA, syncope, weakness, and all other sxs at this time. Pt states that he is on Coumadin at this time and is a MWF dialysis pt. In the ED, pt acutely bleeding and was given 2 units of emergent blood. ED physician placed several sutures and applied compression dressing. Bleed was then controlled. Dr Gleason (vascular surgery) was consulted in the ED, plans to remove access and place vascath tomorrow in OR. Labs unremarkable. Patient placed in observation for infected graft and further monitoring.       Overview/Hospital Course:  No notes on file    Interval History:  Hemodialysis again today.  Successful HD last night post-op removed 3 L.  Expect to remove 2 L today.  Left arm still has post-surgical pain.  Vascular surgery wants to redress wound tomorrow then may return home.    Review of Systems   Constitutional: Positive for fatigue. Negative for chills and fever.   HENT: Negative.  Negative for congestion, dental problem, rhinorrhea and sore throat.    Eyes: Negative.  Negative for visual disturbance.   Respiratory: Negative.  Negative for cough, shortness of breath and wheezing.    Cardiovascular: Negative.  Negative for chest pain,  palpitations and leg swelling.   Gastrointestinal: Negative for abdominal pain, diarrhea, nausea and vomiting.   Endocrine: Negative.  Negative for cold intolerance and heat intolerance.   Genitourinary: Negative.  Negative for dysuria and hematuria.   Musculoskeletal: Positive for arthralgias. Negative for gait problem and myalgias.   Skin: Negative.  Negative for rash and wound.   Allergic/Immunologic: Negative.    Neurological: Negative.  Negative for dizziness, syncope, weakness, numbness and headaches.   Hematological: Negative.  Negative for adenopathy.   Psychiatric/Behavioral: Negative.  Negative for confusion and dysphoric mood.   All other systems reviewed and are negative.    Objective:     Vital Signs (Most Recent):  Temp: 98 °F (36.7 °C) (07/03/20 1250)  Pulse: 85 (07/03/20 1250)  Resp: 16 (07/03/20 1427)  BP: 128/72 (07/03/20 1250)  SpO2: 97 % (07/03/20 0915) Vital Signs (24h Range):  Temp:  [97 °F (36.1 °C)-98 °F (36.7 °C)] 98 °F (36.7 °C)  Pulse:  [62-96] 85  Resp:  [10-38] 16  SpO2:  [86 %-98 %] 97 %  BP: ()/(42-89) 128/72     Weight: 103 kg (227 lb 1.2 oz)  Body mass index is 29.15 kg/m².    Intake/Output Summary (Last 24 hours) at 7/3/2020 1554  Last data filed at 7/3/2020 1235  Gross per 24 hour   Intake 1110 ml   Output 2500 ml   Net -1390 ml      Physical Exam  Constitutional:       General: He is not in acute distress.     Appearance: He is well-developed. He is not diaphoretic.   HENT:      Head: Normocephalic and atraumatic.   Eyes:      Conjunctiva/sclera: Conjunctivae normal.      Pupils: Pupils are equal, round, and reactive to light.   Neck:      Thyroid: No thyromegaly.      Vascular: No JVD.   Cardiovascular:      Rate and Rhythm: Normal rate and regular rhythm.      Heart sounds: Normal heart sounds. No murmur. No friction rub. No gallop.       Comments: Right IJV vascular catheter in place and functional.  Pulmonary:      Effort: Pulmonary effort is normal. No respiratory  distress.      Breath sounds: Normal breath sounds. No wheezing or rales.   Abdominal:      General: Bowel sounds are normal. There is no distension.      Palpations: Abdomen is soft.      Tenderness: There is no abdominal tenderness. There is no guarding or rebound.   Musculoskeletal: Normal range of motion.         General: No tenderness.      Comments: Left arm dressed and ace wrapped.  Distal radial pulse 1+.  Normal sensation and ROM left hand.   Lymphadenopathy:      Cervical: No cervical adenopathy.   Skin:     General: Skin is warm and dry.      Findings: No erythema or rash.   Neurological:      Mental Status: He is alert and oriented to person, place, and time.      Cranial Nerves: No cranial nerve deficit.      Deep Tendon Reflexes: Reflexes are normal and symmetric. Reflexes normal.   Psychiatric:         Behavior: Behavior normal.         Thought Content: Thought content normal.         Judgment: Judgment normal.         Significant Labs: All pertinent labs within the past 24 hours have been reviewed.    Significant Imaging: I have reviewed all pertinent imaging results/findings within the past 24 hours.      Assessment/Plan:      * Arteriovenous graft infection  Initially had bleeding at a graft site.  Bleeding has stopped, H/H stable.  Dr Gleason on board.  Fistula removal and Vascular Catheter placement 02 July with resumption of hemodialysis.  Vascular surgery to re-evaluate the wound Saturday 04 July then may return home and follow up outpatient.  Blood cultures drawn and specimen culture of graft pending.  Continuing Vancomycin and Cefepime.    Atrial fibrillation  Coumadin on hold due to acute blood loss and sx planned for tomorrow   Heart rate controlled        ESRD (end stage renal disease) on dialysis  HD on M/W/F   Nephrology consulted for HD management       Essential hypertension  Continue home meds   Monitor         VTE Risk Mitigation (From admission, onward)         Ordered     warfarin  tablet 7.5 mg  Every Tuesday 07/02/20 1050     warfarin (COUMADIN) tablet 15 mg  Every Saturday 07/02/20 1050     warfarin tablet 7.5 mg  Every Mon, Wed, Fri, Sun      07/02/20 1050     warfarin (COUMADIN) tablet 15 mg  Every Thursday 07/02/20 1050     IP VTE HIGH RISK PATIENT  Once      07/01/20 1340     Place sequential compression device  Until discontinued      07/01/20 1340                      Nicolás Gerardo MD  Department of Hospital Medicine   [facility]

## 2020-07-03 NOTE — SUBJECTIVE & OBJECTIVE
Interval History:  Hemodialysis again today.  Successful HD last night post-op removed 3 L.  Expect to remove 2 L today.  Left arm still has post-surgical pain.  Vascular surgery wants to redress wound tomorrow then may return home.    Review of Systems   Constitutional: Positive for fatigue. Negative for chills and fever.   HENT: Negative.  Negative for congestion, dental problem, rhinorrhea and sore throat.    Eyes: Negative.  Negative for visual disturbance.   Respiratory: Negative.  Negative for cough, shortness of breath and wheezing.    Cardiovascular: Negative.  Negative for chest pain, palpitations and leg swelling.   Gastrointestinal: Negative for abdominal pain, diarrhea, nausea and vomiting.   Endocrine: Negative.  Negative for cold intolerance and heat intolerance.   Genitourinary: Negative.  Negative for dysuria and hematuria.   Musculoskeletal: Positive for arthralgias. Negative for gait problem and myalgias.   Skin: Negative.  Negative for rash and wound.   Allergic/Immunologic: Negative.    Neurological: Negative.  Negative for dizziness, syncope, weakness, numbness and headaches.   Hematological: Negative.  Negative for adenopathy.   Psychiatric/Behavioral: Negative.  Negative for confusion and dysphoric mood.   All other systems reviewed and are negative.    Objective:     Vital Signs (Most Recent):  Temp: 98 °F (36.7 °C) (07/03/20 1250)  Pulse: 85 (07/03/20 1250)  Resp: 16 (07/03/20 1427)  BP: 128/72 (07/03/20 1250)  SpO2: 97 % (07/03/20 0915) Vital Signs (24h Range):  Temp:  [97 °F (36.1 °C)-98 °F (36.7 °C)] 98 °F (36.7 °C)  Pulse:  [62-96] 85  Resp:  [10-38] 16  SpO2:  [86 %-98 %] 97 %  BP: ()/(42-89) 128/72     Weight: 103 kg (227 lb 1.2 oz)  Body mass index is 29.15 kg/m².    Intake/Output Summary (Last 24 hours) at 7/3/2020 1554  Last data filed at 7/3/2020 1235  Gross per 24 hour   Intake 1110 ml   Output 2500 ml   Net -1390 ml      Physical Exam  Constitutional:       General: He is  not in acute distress.     Appearance: He is well-developed. He is not diaphoretic.   HENT:      Head: Normocephalic and atraumatic.   Eyes:      Conjunctiva/sclera: Conjunctivae normal.      Pupils: Pupils are equal, round, and reactive to light.   Neck:      Thyroid: No thyromegaly.      Vascular: No JVD.   Cardiovascular:      Rate and Rhythm: Normal rate and regular rhythm.      Heart sounds: Normal heart sounds. No murmur. No friction rub. No gallop.       Comments: Right IJV vascular catheter in place and functional.  Pulmonary:      Effort: Pulmonary effort is normal. No respiratory distress.      Breath sounds: Normal breath sounds. No wheezing or rales.   Abdominal:      General: Bowel sounds are normal. There is no distension.      Palpations: Abdomen is soft.      Tenderness: There is no abdominal tenderness. There is no guarding or rebound.   Musculoskeletal: Normal range of motion.         General: No tenderness.      Comments: Left arm dressed and ace wrapped.  Distal radial pulse 1+.  Normal sensation and ROM left hand.   Lymphadenopathy:      Cervical: No cervical adenopathy.   Skin:     General: Skin is warm and dry.      Findings: No erythema or rash.   Neurological:      Mental Status: He is alert and oriented to person, place, and time.      Cranial Nerves: No cranial nerve deficit.      Deep Tendon Reflexes: Reflexes are normal and symmetric. Reflexes normal.   Psychiatric:         Behavior: Behavior normal.         Thought Content: Thought content normal.         Judgment: Judgment normal.         Significant Labs: All pertinent labs within the past 24 hours have been reviewed.    Significant Imaging: I have reviewed all pertinent imaging results/findings within the past 24 hours.

## 2020-07-03 NOTE — PROGRESS NOTES
Pharmacy Brief Progress Note:    Patient educated on warfarin indication, side effects, and drug interactions. Discussed importance of medication compliance and INR monitoring and reviewed signs of abnormal bleeding. Patient given warfarin educational handout. Patient expressed understanding and had no further questions.    Thank you for allowing us to participate in this patient's care.     Lina Pitt 7/3/2020 12:18 PM

## 2020-07-03 NOTE — PROGRESS NOTES
Isidro White Jr. is a 50 y.o. male patient.   1. Bleeding from dialysis shunt, initial encounter    2. End stage renal disease    3. Arteriovenous graft infection      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     HD M, W, F    ESRD on dialysis     Hepatitis B     Hyperlipidemia     Hypertension     DANIEL (obstructive sleep apnea) 11/12/2019    Secondary hyperparathyroidism of renal origin     Supraventricular tachycardia     atrial tachycardia    Tachycardia     Valvular regurgitation     AI, TR     No past surgical history pertinent negatives on file.  Scheduled Meds:   amLODIPine  10 mg Oral Daily    calcium acetate(phosphat bind)  667 mg Oral TID    ceFEPime (MAXIPIME) IVPB  2 g Intravenous Q24H    diltiaZEM  360 mg Oral Daily    epoetin vikki-ebpx (RETACRIT) injection  10,000 Units Intravenous Once    epoetin vikki-ebpx (RETACRIT) injection  10,000 Units Intravenous Once    hydrALAZINE  100 mg Oral Q8H    labetaloL  100 mg Oral Q12H    lisinopriL  40 mg Oral Daily    nozaseptin   Each Nostril BID    pantoprazole  40 mg Oral Daily    tenofovir  300 mg Oral Weekly    vit b cmplx 3-fa-vit c-biotin 1- mg-mg-mcg  1 tablet Oral Daily    warfarin  15 mg Oral Every Thurs    [START ON 7/4/2020] warfarin  15 mg Oral Every Sat    warfarin  7.5 mg Oral Every Mon, Wed, Fri, Sun    [START ON 7/7/2020] warfarin  7.5 mg Oral Every Tues     Continuous Infusions:  PRN Meds:albuterol-ipratropium, HYDROcodone-acetaminophen, HYDROmorphone, hydrOXYzine pamoate, morphine, ondansetron, sodium chloride 0.9%, Pharmacy to dose Vancomycin consult **AND** vancomycin - pharmacy to dose    Review of patient's allergies indicates:  No Known Allergies  Active Hospital Problems    Diagnosis  POA     "*Arteriovenous graft infection [T82.7XXA]  Yes    Disorder of electrolytes [E87.8]  Yes    Anemia associated with chronic renal failure [N18.9, D63.1]  Yes    NSVT (nonsustained ventricular tachycardia) [I47.2]  Yes    Bleeding from dialysis shunt [T82.838A]  Yes    Atrial fibrillation [I48.91]  Yes    ESRD (end stage renal disease) on dialysis [N18.6, Z99.2]  Not Applicable     Chronic    S/P AVR (aortic valve replacement) [Z95.2]  Not Applicable    Essential hypertension [I10]  Yes      Resolved Hospital Problems   No resolved problems to display.     Blood pressure 126/77, pulse 81, temperature 97.8 °F (36.6 °C), temperature source Oral, resp. rate 16, height 6' 2" (1.88 m), weight 103 kg (227 lb 1.2 oz), SpO2 97 %.    Subjective post left arm graft excision; on dialysis now with Vas cath  Objective dressing dry   Assessment & Plan    remove dressing left arm tomorrow - follow up with our office in 2 weeks  Nicolás Gleason MD  7/3/2020  "

## 2020-07-03 NOTE — SUBJECTIVE & OBJECTIVE
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     HD M, W, F    ESRD on dialysis     Hepatitis B     Hyperlipidemia     Hypertension     DANIEL (obstructive sleep apnea) 11/12/2019    Secondary hyperparathyroidism of renal origin     Supraventricular tachycardia     atrial tachycardia    Tachycardia     Valvular regurgitation     AI, TR       Past Surgical History:   Procedure Laterality Date    ASD REPAIR      age 8 [facility]    AV Graft Creation Left 03/2017    CARD[facility]AC [facility]TERIZATION      [facility]     CARDIAC VALVE SURGERY  02/08/2017    22 mm Medtronic AV, 28 mm TV Medtronic annuloplaty ring    COLONOSCOPY N/A 8/27/2019    Procedure: COLONOSCOPY;  Surgeon: Addie John MD;  Location: [facility] ENDO;  Service: Endoscopy;  Laterality: N/A;  dialysis pt *needs K*    ESOPHAGOGASTRODUODENOSCOPY N/A 8/27/2019    Procedure: EGD (ESOPHAGOGASTRODUODENOSCOPY);  Surgeon: Addie John MD;  Location: [facility] ENDO;  Service: Endoscopy;  Laterality: N/A;    RADIOFREQUENCY ABLATION  03/13/2017    Atrial tachycardia       Review of patient's allergies indicates:  No Known Allergies    Current Facility-Administered Medications on File Prior to Encounter   Medication    0.9%  NaCl infusion    sodium chloride 0.9% flush 10 mL     Current Outpatient Medications on File Prior to Encounter   Medication Sig    labetalol (NORMODYNE) 100 MG tablet TAKE ONE TABLET BY MOUTH THREE TIMES DAILY AS NEEDED FOR HEART RATE > 120    albuterol (VENTOLIN HFA) 90 mcg/actuation inhaler Inhale 2 puffs into the lungs every 6 (six) hours as needed for Wheezing or Shortness of Breath. Rescue    amLODIPine (NORVASC) 10 MG tablet TAKE ONE TABLET BY MOUTH EVERY DAY    amoxicillin (AMOXIL) 500 MG capsule Take 1 capsule (500  mg total) by mouth 3 (three) times daily x 14 days    ascorbic acid, vitamin C, (VITAMIN C) 500 MG tablet Take 1 tablet (500 mg total) by mouth 2 (two) times daily.    b complex vitamins tablet Take 1 tablet by mouth once daily.    calcium acetate (PHOSLO) 667 mg capsule Take 1 capsule (667 mg total) by mouth 3 (three) times daily.    diltiaZEM (CARDIZEM CD) 360 MG 24 hr capsule Take 1 capsule (360 mg total) by mouth once daily.    enoxaparin (LOVENOX) 100 mg/mL Syrg Inject 0.9 mLs (90 mg total) into the skin once daily.    epoetin vikki (PROCRIT) 10,000 unit/mL injection Inject 1 mL (10,000 Units total) into the skin every Mon, Wed, Fri. To be given with HD    fish oil-omega-3 fatty acids 300-1,000 mg capsule Take 2 g by mouth once daily.    hydrALAZINE (APRESOLINE) 100 MG tablet TAKE ONE TABLET BY MOUTH THREE TIMES DAILY INCLUDING DIALYSIS DAYS    hydrOXYzine pamoate (VISTARIL) 25 MG Cap T1C PO QD PRN ITCHING    lisinopril (PRINIVIL,ZESTRIL) 40 MG tablet TAKE ONE TABLET BY MOUTH EVERY DAY    multivitamin with minerals tablet Take 1 tablet by mouth once daily.    omega-3 fatty acids-vitamin E (FISH OIL) 1,000 mg Cap Take 1 capsule by mouth once daily.    pantoprazole (PROTONIX) 40 MG tablet TAKE ONE TABLET BY MOUTH EVERY DAY    ETHAN-RACQUEL RX 1- mg-mg-mcg Tab TAKE ONE TABLET BY MOUTH EVERY DAY    tenofovir (VIREAD) 300 mg Tab Take 1 tablet (300 mg total) by mouth once a week.    vitamin D (VITAMIN D3) 1000 units Tab Take 1,000 Units by mouth 3 (three) times a week.    warfarin (COUMADIN) 7.5 MG tablet Take 2 tablets by mouth on Thursdays and 1 tablet on all other days of the week. (Patient taking differently: Take 2 tablets by mouth on Thursdays and Saturday; and 1 tablet on all other days of the week.)     Family History     Problem Relation (Age of Onset)    Anesthesia problems Paternal Uncle    Diabetes Paternal Aunt, Paternal Aunt    Heart attack Father    Heart failure Paternal Grandmother     Hypertension Paternal Aunt    Leukemia Mother, Paternal Aunt    No Known Problems Sister, Brother, Sister, Sister    Stroke Paternal Aunt    Suicide Paternal Uncle    Valvular heart disease Maternal Aunt        Tobacco Use    Smoking status: Never Smoker    Smokeless tobacco: Never Used   Substance and Sexual Activity    Alcohol use: No     Frequency: Never     Comment: quit in 2016; does have heavy drinking history; started drinking at age 13; was drinking a 12 pack over the weekend and two 24 ounces of beer a day during the week along with a pint of hard alcohol    Drug use: No    Sexual activity: Not on file     Review of Systems   Constitution: Positive for malaise/fatigue.   HENT: Negative.    Eyes: Negative.    Cardiovascular: Negative.    Respiratory: Negative.    Endocrine: Negative.    Hematologic/Lymphatic: Bruises/bleeds easily.   Skin: Negative.    Musculoskeletal: Positive for joint swelling (left arm).   Gastrointestinal: Negative.    Genitourinary: Negative.    Neurological: Negative.    Psychiatric/Behavioral: Negative.    Allergic/Immunologic: Negative.      Objective:     Vital Signs (Most Recent):  Temp: 98.6 °F (37 °C) (07/03/20 1556)  Pulse: 81 (07/03/20 1556)  Resp: 18 (07/03/20 1556)  BP: 117/63 (07/03/20 1556)  SpO2: (!) 94 % (07/03/20 1556) Vital Signs (24h Range):  Temp:  [97 °F (36.1 °C)-98.6 °F (37 °C)] 98.6 °F (37 °C)  Pulse:  [62-96] 81  Resp:  [10-38] 18  SpO2:  [86 %-98 %] 94 %  BP: ()/(42-89) 117/63     Weight: 103 kg (227 lb 1.2 oz)  Body mass index is 29.15 kg/m².    SpO2: (!) 94 %  O2 Device (Oxygen Therapy): room air      Intake/Output Summary (Last 24 hours) at 7/3/2020 1616  Last data filed at 7/3/2020 1235  Gross per 24 hour   Intake 860 ml   Output 2500 ml   Net -1640 ml       Lines/Drains/Airways     Central Venous Catheter Line                 Hemodialysis Catheter 07/02/20 right internal jugular 1 day          Peripheral Intravenous Line                  Peripheral IV - Single Lumen 07/02/20 1623 18 G Right Antecubital less than 1 day                Physical Exam   Constitutional: He is oriented to person, place, and time. He appears well-developed and well-nourished. No distress.   HENT:   Head: Normocephalic and atraumatic.   Eyes: Pupils are equal, round, and reactive to light. Right eye exhibits no discharge. Left eye exhibits no discharge.   Neck: Neck supple. No JVD present.   Cardiovascular: Normal rate, regular rhythm, S1 normal and S2 normal.   Murmur heard.  +mechanical click   Pulmonary/Chest: Effort normal and breath sounds normal. No respiratory distress. He has no wheezes. He has no rales.   Abdominal: Soft. He exhibits no distension.   Musculoskeletal:         General: No edema.   Neurological: He is alert and oriented to person, place, and time.   Skin: Skin is warm and dry. He is not diaphoretic. No erythema.   Left arm fistula wrapped/dressed    Arm warm to touch   Psychiatric: He has a normal mood and affect. His behavior is normal. Thought content normal.   Nursing note and vitals reviewed.      Significant Labs:   CMP   Recent Labs   Lab 07/02/20  0626 07/03/20  0940    134*   K 5.6* 5.7*   CL 96 97   CO2 17* 21*   GLU 87 96   BUN 86* 74*   CREATININE 17.8* 14.8*   CALCIUM 9.6 8.8   ANIONGAP 23* 16   ESTGFRAFRICA 3* 4*   EGFRNONAA 3* 3*   , CBC   Recent Labs   Lab 07/02/20  0626 07/03/20  0432   WBC 8.46 9.95   HGB 10.5* 9.7*   HCT 33.2* 30.0*   * 150   , Troponin No results for input(s): TROPONINI in the last 48 hours. and All pertinent lab results from the last 24 hours have been reviewed.    Significant Imaging: Echocardiogram:   2D echo with color flow doppler:   Results for orders placed or performed during the hospital encounter of 04/28/19   2D echo with color flow doppler   Result Value Ref Range    QEF 60 55 - 65    Diastolic Dysfunction Yes (A)     Est. PA Systolic Pressure 57.15 (A)     Tricuspid Valve Regurgitation MILD      Narrative    Date of Procedure: 05/01/2019        TEST DESCRIPTION       Aorta: The aortic root is normal in size, measuring 3.6 cm at sinotubular junction and 3.8 cm at Sinuses of Valsalva. The proximal ascending aorta is normal in size, measuring 3.4 cm across.     Left Atrium: The left atrial volume index is mildly enlarged, measuring 36.75 cc/m2.     Left Ventricle: The left ventricle is normal in size, with an end-diastolic diameter of 4.1 cm, and an end-systolic diameter of 2.4 cm. LV wall thickness is normal, with the septum measuring 1.8 cm and the posterior wall measuring 1.7 cm across. Relative   wall thickness was increased at 0.83, and the LV mass index was increased at 173.5 g/m2 consistent with concentric left ventricular hypertrophy. There are no regional wall motion abnormalities. Left ventricular systolic function appears normal. Visually   estimated ejection fraction is 60-65%. The LV Doppler derived stroke volume equals 129.0 ccs.     Diastolic indices: E wave velocity 1.4 m/s, E/A ratio 1.3,  msec., E/e' ratio(avg) 20. There is pseudonormalization of mitral inflow pattern consistent with significant diastolic dysfunction.     Right Atrium: The right atrium is normal in size, measuring 5.8 cm in length and 4.0 cm in width in the apical view.     Right Ventricle: The right ventricle is normal in size. Global right ventricular systolic function appears normal. Tricuspid annular plane systolic excursion (TAPSE) is 1.7 cm. The estimated PA systolic pressure is greater than 57 mmHg.     Aortic Valve:  There is a mechanical prosthesis present in the aortic position. The peak velocity obtained across the aortic valve is 3.89 m/s, which translates to a peak gradient of 61 mmHg. The mean gradient is 31 mmHg. Using a left ventricular outflow   tract diameter of 2.2 cm, a left ventricular outflow tract velocity time integral of 34 cm, and a peak instantaneous transvalvular velocity time integral of  64 cm, the effective prosthetic valve area is 2.01 cm2(p AVAi is 0.94 cm2/m2).     Mitral Valve:  The pressure half time is 38 msec. The calculated mitral valve area is 5.79 cm2. Mitral valve is normal in structure with normal leaflet mobility.     Tricuspid Valve:  There is mild tricuspid regurgitation. Tricuspid valve is normal in structure with normal leaflet mobility.     Pulmonary Valve:  Pulmonary valve is normal in structure with normal leaflet mobility.     Intracavitary: There is no evidence of pericardial effusion, intracavity mass, thrombi, or vegetation.         CONCLUSIONS     1 - Mild left atrial enlargement.     2 - Concentric hypertrophy.     3 - No wall motion abnormalities.     4 - Normal left ventricular systolic function (EF 60-65%).     5 - Impaired LV relaxation, elevated LAP (grade 2 diastolic dysfunction).     6 - Normal right ventricular systolic function .     7 - Pulmonary hypertension. The estimated PA systolic pressure is greater than 57 mmHg.     8 - Aortic valve prosthesis, JHOANA = 2.01 cm2, AVAi = 0.94 cm2/m2, peak velocity = 3.89 m/s, mean gradient = 31 mmHg.     9 - Mild tricuspid regurgitation.             This document has been electronically    SIGNED BY: Shai Calderon MD On: 05/01/2019 15:34   , EKG: Reviewed and X-Ray: CXR: X-Ray Chest 1 View (CXR): No results found for this visit on 07/01/20. and X-Ray Chest PA and Lateral (CXR): No results found for this visit on 07/01/20.

## 2020-07-03 NOTE — PROGRESS NOTES
Clinical Pharmacy Progress Note: Coumadin Dosing and Monitoring     Goal INR: 2-3   Indication: S/P AVR (aortic valve replacement), Atrial fibrillation    Lab Results   Component Value Date    INR 3.3 (H) 07/02/2020    INR 3.2 (H) 07/01/2020    INR 2.5 (H) 06/16/2020    INR 2.5 (H) 06/16/2020     Patient has not yet been educated by pharmacist this admission.   Home dosing regimen: Warfarin 15 mg every Thursday, Saturday, and warfarin 7.5 mg on all other days.   Drug interactions: No significant drug interactions noted.    Plan:  - Due to INR above therapeutic range, HOLD warfarin dose today.   - Pharmacy will continue to monitor daily PT/INR. Dose adjustments will be made accordingly.      Thank you for allowing us to participate in this patient's care.      Gayatri Tanner PharmD 07/02/2020 7:01 PM

## 2020-07-03 NOTE — ASSESSMENT & PLAN NOTE
Bleeding has stopped  H/H stable  Repeat CBC pending   Follow blood cx  IV vancomycin and cefepime   Dr Gleason on board.  Fistula removal and Vascular Catheter placement 02 July with resumption of hemodialysis.

## 2020-07-03 NOTE — PROGRESS NOTES
Pharmacokinetic Assessment Follow Up: IV Vancomycin    Vancomycin serum concentration assessment(s):    The random level was drawn correctly and can be used to guide therapy at this time. The measurement is above the desired definitive target range of 10 to 20 mcg/mL.    Vancomycin Regimen Plan:    Re-dose when the random level is less than 25 mcg/mL, next level to be drawn at 0430 on 7/6    Drug levels (last 3 results):  Recent Labs   Lab Result Units 07/03/20  0432   Vancomycin, Random ug/mL 37.5       Pharmacy will continue to follow and monitor vancomycin.    Please contact pharmacy at extension [phone] for questions regarding this assessment.    Thank you for the consult,   Lina Pitt       Patient brief summary:  Isidro White Jr. is a 50 y.o. male initiated on antimicrobial therapy with IV Vancomycin for treatment of infected AV graft    The patient's current regimen is pre-HD random levels     Drug Allergies:   Review of patient's allergies indicates:  No Known Allergies    Actual Body Weight:   103kg    Renal Function:   Estimated Creatinine Clearance: 6.4 mL/min (A) (based on SCr of 17.8 mg/dL (H)).,     Dialysis Method (if applicable):  intermittent HD    CBC (last 72 hours):  Recent Labs   Lab Result Units 07/01/20  1030 07/01/20  1433 07/02/20  0626 07/03/20  0432   WBC K/uL 9.06 8.53 8.46 9.95   Hemoglobin g/dL 10.8* 9.9* 10.5* 9.7*   Hematocrit % 32.0* 30.0* 33.2* 30.0*   Platelets K/uL 155 124* 113* 150   Gran% % 54.8 83.6* 72.5 85.8*   Lymph% % 30.9 9.7* 15.0* 7.4*   Mono% % 11.3 5.7 9.1 6.1   Eosinophil% % 2.6 0.5 2.8 0.0   Basophil% % 0.2 0.1 0.4 0.1   Differential Method  Automated Automated Automated Automated       Metabolic Panel (last 72 hours):  Recent Labs   Lab Result Units 07/01/20  1030 07/02/20  0626   Sodium mmol/L 139 136   Potassium mmol/L 4.6 5.6*   Chloride mmol/L 95 96   CO2 mmol/L 24 17*   Glucose mg/dL 126* 87   BUN, Bld mg/dL 72* 86*   Creatinine mg/dL 16.2* 17.8*    Albumin g/dL 3.4*  --    Total Bilirubin mg/dL 0.5  --    Alkaline Phosphatase U/L 76  --    AST U/L 17  --    ALT U/L 18  --        Vancomycin Administrations:  vancomycin given in the last 96 hours                   vancomycin 2 g in dextrose 5 % 500 mL IVPB (mg) 2,000 mg New Bag 07/01/20 1623                Microbiologic Results:  Microbiology Results (last 7 days)     Procedure Component Value Units Date/Time    Blood culture [[id number]] Collected: 07/01/20 1434    Order Status: Completed Specimen: Blood Updated: 07/02/20 2212     Blood Culture, Routine No Growth to date      No Growth to date    Blood culture [[id number]] Collected: 07/01/20 1433    Order Status: Completed Specimen: Blood Updated: 07/02/20 2212     Blood Culture, Routine No Growth to date      No Growth to date    Aerobic culture [[id number]] Collected: 07/02/20 1557    Order Status: Sent Specimen: Graft from Arm, Left Updated: 07/02/20 1959

## 2020-07-04 LAB
ANION GAP SERPL CALC-SCNC: 16 MMOL/L (ref 8–16)
BASOPHILS # BLD AUTO: 0.01 K/UL (ref 0–0.2)
BASOPHILS # BLD AUTO: 0.02 K/UL (ref 0–0.2)
BASOPHILS NFR BLD: 0.1 % (ref 0–1.9)
BASOPHILS NFR BLD: 0.2 % (ref 0–1.9)
BUN SERPL-MCNC: 62 MG/DL (ref 6–20)
CALCIUM SERPL-MCNC: 8.8 MG/DL (ref 8.7–10.5)
CHLORIDE SERPL-SCNC: 96 MMOL/L (ref 95–110)
CO2 SERPL-SCNC: 23 MMOL/L (ref 23–29)
CREAT SERPL-MCNC: 12.9 MG/DL (ref 0.5–1.4)
DIFFERENTIAL METHOD: ABNORMAL
DIFFERENTIAL METHOD: ABNORMAL
EOSINOPHIL # BLD AUTO: 0.1 K/UL (ref 0–0.5)
EOSINOPHIL # BLD AUTO: 0.2 K/UL (ref 0–0.5)
EOSINOPHIL NFR BLD: 1 % (ref 0–8)
EOSINOPHIL NFR BLD: 2.8 % (ref 0–8)
ERYTHROCYTE [DISTWIDTH] IN BLOOD BY AUTOMATED COUNT: 12.7 % (ref 11.5–14.5)
ERYTHROCYTE [DISTWIDTH] IN BLOOD BY AUTOMATED COUNT: 12.7 % (ref 11.5–14.5)
EST. GFR  (AFRICAN AMERICAN): 5 ML/MIN/1.73 M^2
EST. GFR  (NON AFRICAN AMERICAN): 4 ML/MIN/1.73 M^2
GLUCOSE SERPL-MCNC: 96 MG/DL (ref 70–110)
HCT VFR BLD AUTO: 23.9 % (ref 40–54)
HCT VFR BLD AUTO: 24.5 % (ref 40–54)
HGB BLD-MCNC: 7.7 G/DL (ref 14–18)
HGB BLD-MCNC: 8 G/DL (ref 14–18)
IMM GRANULOCYTES # BLD AUTO: 0.01 K/UL (ref 0–0.04)
IMM GRANULOCYTES # BLD AUTO: 0.05 K/UL (ref 0–0.04)
IMM GRANULOCYTES NFR BLD AUTO: 0.1 % (ref 0–0.5)
IMM GRANULOCYTES NFR BLD AUTO: 0.6 % (ref 0–0.5)
INR PPP: 2.1 (ref 0.8–1.2)
LYMPHOCYTES # BLD AUTO: 1.4 K/UL (ref 1–4.8)
LYMPHOCYTES # BLD AUTO: 1.5 K/UL (ref 1–4.8)
LYMPHOCYTES NFR BLD: 17.2 % (ref 18–48)
LYMPHOCYTES NFR BLD: 21.5 % (ref 18–48)
MCH RBC QN AUTO: 33.6 PG (ref 27–31)
MCH RBC QN AUTO: 34.8 PG (ref 27–31)
MCHC RBC AUTO-ENTMCNC: 31.4 G/DL (ref 32–36)
MCHC RBC AUTO-ENTMCNC: 33.5 G/DL (ref 32–36)
MCV RBC AUTO: 104 FL (ref 82–98)
MCV RBC AUTO: 107 FL (ref 82–98)
MONOCYTES # BLD AUTO: 1 K/UL (ref 0.3–1)
MONOCYTES # BLD AUTO: 1.1 K/UL (ref 0.3–1)
MONOCYTES NFR BLD: 12.8 % (ref 4–15)
MONOCYTES NFR BLD: 14.7 % (ref 4–15)
NEUTROPHILS # BLD AUTO: 4.3 K/UL (ref 1.8–7.7)
NEUTROPHILS # BLD AUTO: 5.6 K/UL (ref 1.8–7.7)
NEUTROPHILS NFR BLD: 60.8 % (ref 38–73)
NEUTROPHILS NFR BLD: 68.2 % (ref 38–73)
NRBC BLD-RTO: 0 /100 WBC
NRBC BLD-RTO: 0 /100 WBC
OVALOCYTES BLD QL SMEAR: ABNORMAL
PHOSPHATE SERPL-MCNC: 8.3 MG/DL (ref 2.7–4.5)
PLATELET # BLD AUTO: 119 K/UL (ref 150–350)
PLATELET # BLD AUTO: 121 K/UL (ref 150–350)
PMV BLD AUTO: 11.1 FL (ref 9.2–12.9)
PMV BLD AUTO: 11.4 FL (ref 9.2–12.9)
POIKILOCYTOSIS BLD QL SMEAR: SLIGHT
POTASSIUM SERPL-SCNC: 4.3 MMOL/L (ref 3.5–5.1)
PROTHROMBIN TIME: 21.6 SEC (ref 9–12.5)
RBC # BLD AUTO: 2.29 M/UL (ref 4.6–6.2)
RBC # BLD AUTO: 2.3 M/UL (ref 4.6–6.2)
SODIUM SERPL-SCNC: 135 MMOL/L (ref 136–145)
WBC # BLD AUTO: 7.07 K/UL (ref 3.9–12.7)
WBC # BLD AUTO: 8.2 K/UL (ref 3.9–12.7)

## 2020-07-04 PROCEDURE — 11000001 HC ACUTE MED/SURG PRIVATE ROOM

## 2020-07-04 PROCEDURE — 99233 PR SUBSEQUENT HOSPITAL CARE,LEVL III: ICD-10-PCS | Mod: ,,, | Performed by: INTERNAL MEDICINE

## 2020-07-04 PROCEDURE — 99233 SBSQ HOSP IP/OBS HIGH 50: CPT | Mod: ,,, | Performed by: INTERNAL MEDICINE

## 2020-07-04 PROCEDURE — 25000003 PHARM REV CODE 250: Performed by: SURGERY

## 2020-07-04 PROCEDURE — 36415 COLL VENOUS BLD VENIPUNCTURE: CPT

## 2020-07-04 PROCEDURE — 63600175 PHARM REV CODE 636 W HCPCS: Performed by: SURGERY

## 2020-07-04 PROCEDURE — 25000003 PHARM REV CODE 250: Performed by: INTERNAL MEDICINE

## 2020-07-04 PROCEDURE — 80048 BASIC METABOLIC PNL TOTAL CA: CPT

## 2020-07-04 PROCEDURE — 21400001 HC TELEMETRY ROOM

## 2020-07-04 PROCEDURE — 85610 PROTHROMBIN TIME: CPT

## 2020-07-04 PROCEDURE — 85025 COMPLETE CBC W/AUTO DIFF WBC: CPT | Mod: 91

## 2020-07-04 PROCEDURE — 25000003 PHARM REV CODE 250: Performed by: NURSE PRACTITIONER

## 2020-07-04 PROCEDURE — 84100 ASSAY OF PHOSPHORUS: CPT

## 2020-07-04 RX ORDER — WARFARIN SODIUM 5 MG/1
15 TABLET ORAL
Status: DISCONTINUED | OUTPATIENT
Start: 2020-07-11 | End: 2020-07-06 | Stop reason: HOSPADM

## 2020-07-04 RX ORDER — HEPARIN SODIUM,PORCINE/D5W 25000/250
12 INTRAVENOUS SOLUTION INTRAVENOUS CONTINUOUS
Status: DISCONTINUED | OUTPATIENT
Start: 2020-07-04 | End: 2020-07-04

## 2020-07-04 RX ORDER — CALCIUM ACETATE 667 MG/1
1334 CAPSULE ORAL 3 TIMES DAILY
Status: DISCONTINUED | OUTPATIENT
Start: 2020-07-04 | End: 2020-07-06 | Stop reason: HOSPADM

## 2020-07-04 RX ADMIN — DILTIAZEM HYDROCHLORIDE 360 MG: 180 CAPSULE, COATED, EXTENDED RELEASE ORAL at 08:07

## 2020-07-04 RX ADMIN — HYDRALAZINE HYDROCHLORIDE 100 MG: 50 TABLET, FILM COATED ORAL at 01:07

## 2020-07-04 RX ADMIN — HYDROCODONE BITARTRATE AND ACETAMINOPHEN 1 TABLET: 5; 325 TABLET ORAL at 01:07

## 2020-07-04 RX ADMIN — AMLODIPINE BESYLATE 10 MG: 10 TABLET ORAL at 08:07

## 2020-07-04 RX ADMIN — B-COMPLEX W/ C & FOLIC ACID TAB 1 MG 1 TABLET: 1 TAB at 08:07

## 2020-07-04 RX ADMIN — LISINOPRIL 40 MG: 20 TABLET ORAL at 08:07

## 2020-07-04 RX ADMIN — HYDRALAZINE HYDROCHLORIDE 100 MG: 50 TABLET, FILM COATED ORAL at 06:07

## 2020-07-04 RX ADMIN — HYDROMORPHONE HYDROCHLORIDE 0.5 MG: 1 INJECTION, SOLUTION INTRAMUSCULAR; INTRAVENOUS; SUBCUTANEOUS at 04:07

## 2020-07-04 RX ADMIN — HYDRALAZINE HYDROCHLORIDE 100 MG: 50 TABLET, FILM COATED ORAL at 09:07

## 2020-07-04 RX ADMIN — HYDROCODONE BITARTRATE AND ACETAMINOPHEN 1 TABLET: 5; 325 TABLET ORAL at 06:07

## 2020-07-04 RX ADMIN — HYDROXYZINE PAMOATE 25 MG: 25 CAPSULE ORAL at 09:07

## 2020-07-04 RX ADMIN — CEFEPIME HYDROCHLORIDE 2 G: 2 INJECTION, SOLUTION INTRAVENOUS at 03:07

## 2020-07-04 RX ADMIN — HYDROMORPHONE HYDROCHLORIDE 0.5 MG: 1 INJECTION, SOLUTION INTRAMUSCULAR; INTRAVENOUS; SUBCUTANEOUS at 08:07

## 2020-07-04 RX ADMIN — CALCIUM ACETATE 1334 MG: 667 CAPSULE ORAL at 09:07

## 2020-07-04 RX ADMIN — PANTOPRAZOLE SODIUM 40 MG: 40 TABLET, DELAYED RELEASE ORAL at 08:07

## 2020-07-04 RX ADMIN — HYDROCODONE BITARTRATE AND ACETAMINOPHEN 1 TABLET: 5; 325 TABLET ORAL at 09:07

## 2020-07-04 RX ADMIN — CALCIUM ACETATE 667 MG: 667 CAPSULE ORAL at 08:07

## 2020-07-04 NOTE — ASSESSMENT & PLAN NOTE
-Heparin gtt   -Coumadin continued   -pharmacy to dose   -INR 2.1   -Coumadin held and Hep gtt on hold due to active bleeding at surgical site

## 2020-07-04 NOTE — SUBJECTIVE & OBJECTIVE
Interval History:     7/3/20: patient is currently on hemodialysis. He is tolerating the procedure well. Vitals are stable. Patient reports left arm pain.     7/4/20: patient is resting comfortably, no complaints at present.         Review of patient's allergies indicates:  No Known Allergies  Current Facility-Administered Medications   Medication Frequency    albuterol-ipratropium 2.5 mg-0.5 mg/3 mL nebulizer solution 3 mL Q6H PRN    amLODIPine tablet 10 mg Daily    calcium acetate(phosphat bind) capsule 667 mg TID    cefepime in dextrose 5 % IVPB 2 g Q24H    diltiaZEM 24 hr capsule 360 mg Daily    epoetin vikki-epbx injection 10,000 Units Once    hydrALAZINE tablet 100 mg Q8H    HYDROcodone-acetaminophen 5-325 mg per tablet 1 tablet Q4H PRN    HYDROmorphone injection 0.5 mg Q4H PRN    hydrOXYzine pamoate capsule 25 mg Q8H PRN    lisinopriL tablet 40 mg Daily    morphine injection 6 mg Q4H PRN    nozaseptin (NOZIN) nasal  BID    ondansetron injection 4 mg Q12H PRN    pantoprazole EC tablet 40 mg Daily    sodium chloride 0.9% flush 10 mL PRN    tenofovir tablet 300 mg Weekly    vancomycin - pharmacy to dose pharmacy to manage frequency    vit b cmplx 3-fa-vit c-biotin 1- mg-mg-mcg per tablet 1 tablet Daily    warfarin (COUMADIN) tablet 15 mg Every Thurs    warfarin (COUMADIN) tablet 15 mg Every Sat    warfarin tablet 7.5 mg Every Mon, Wed, Fri, Sun    [START ON 7/7/2020] warfarin tablet 7.5 mg Every Tues     Facility-Administered Medications Ordered in Other Encounters   Medication Frequency    0.9%  NaCl infusion Continuous    sodium chloride 0.9% flush 10 mL PRN       Objective:     Vital Signs (Most Recent):  Temp: 98 °F (36.7 °C) (07/04/20 0710)  Pulse: 83 (07/04/20 0710)  Resp: 18 (07/04/20 0827)  BP: 129/79 (07/04/20 0710)  SpO2: 98 % (07/04/20 0710)  O2 Device (Oxygen Therapy): room air (07/03/20 1501) Vital Signs (24h Range):  Temp:  [97.9 °F (36.6 °C)-98.6 °F (37 °C)]  98 °F (36.7 °C)  Pulse:  [77-96] 83  Resp:  [16-18] 18  SpO2:  [94 %-98 %] 98 %  BP: ()/(58-89) 129/79     Weight: 103 kg (227 lb 1.2 oz) (07/01/20 1436)  Body mass index is 29.15 kg/m².  Body surface area is 2.32 meters squared.    I/O last 3 completed shifts:  In: 860 [P.O.:360; Other:500]  Out: 2500 [Other:2500]    Physical Exam  Constitutional:       Appearance: He is well-developed.   HENT:      Head: Normocephalic.      Nose: No rhinorrhea.      Mouth/Throat:      Pharynx: No oropharyngeal exudate.   Eyes:      Pupils: Pupils are equal, round, and reactive to light.   Neck:      Thyroid: No thyroid mass.   Cardiovascular:      Rate and Rhythm: Normal rate and regular rhythm.      Heart sounds: S1 normal and S2 normal.   Pulmonary:      Effort: Pulmonary effort is normal. No respiratory distress.      Breath sounds: No wheezing.   Abdominal:      General: Bowel sounds are normal. There is no distension.      Palpations: Abdomen is soft.      Tenderness: There is no abdominal tenderness.      Hernia: No hernia is present.   Musculoskeletal:      Comments: Left arm dressed and wrapped.    Lymphadenopathy:      Cervical: No cervical adenopathy.   Skin:     General: Skin is warm and dry.   Neurological:      Mental Status: He is alert and oriented to person, place, and time.   Psychiatric:         Behavior: Behavior is cooperative.         Significant Labs:  Lab Results   Component Value Date    CREATININE 12.9 (H) 07/04/2020    BUN 62 (H) 07/04/2020     (L) 07/04/2020    K 4.3 07/04/2020    CL 96 07/04/2020    CO2 23 07/04/2020     Lab Results   Component Value Date    .0 (H) 01/08/2019    CALCIUM 8.8 07/04/2020    PHOS 8.3 (H) 07/04/2020     Lab Results   Component Value Date    ALBUMIN 3.4 (L) 07/01/2020     Lab Results   Component Value Date    WBC 7.07 07/04/2020    HGB 7.7 (L) 07/04/2020    HCT 24.5 (L) 07/04/2020     (H) 07/04/2020     (L) 07/04/2020       No results for  input(s): MG in the last 168 hours.      Significant Imaging:  Imaging Results    None

## 2020-07-04 NOTE — ASSESSMENT & PLAN NOTE
Coumadin on hold due to acute blood loss   -will resume Coumadin when bleeding stable   Heart rate controlled

## 2020-07-04 NOTE — PROGRESS NOTES
Contacted Dr. Gleason about pt's dressing he stated it was okay, to completely take it down. I message John PHAM about coming to see pt. Will continue to monitor

## 2020-07-04 NOTE — HOSPITAL COURSE
Pt admitted to Medical Surgical Unit for Arteriovenous graft infection with left graft excision per Vascular Surgery.  IV antibiotics continued.  Heparin gtt noted.  Coumadin continued with pharmacy to dose.  Increased bleeding noted with anticoagulation adjusted.  H/H stable with downward trend noted.  Dressing to LUE reinforced.  Cariology and Nephrology following.  If bleeding stable will plan for discharge post HD.  On 7/5/2020, pt H/H declined with PRBCs x 1 unit given.  Pt remained stable and no bleeding from AVG site appreciated.  INR of 2 noted with Coumadin resumed and case discussed with Cardiology.  IV Vancomycin to be continued with outpatient HD per Nephrology.  On date of discharge, pt tolerated HD.  Coumadin continued with INR noted.  No evidence of bleeding to AV/surgical site noted.  H/H remained stable.  Pt seen and examined on the date of discharge and deemed suitable for discharge to home.  Pt to resume outpatient HD schedule.  Current medications resumed with Colace, Bacroban, Nozin, and Miralax prescribed.  IV Vancomycin to be continued outpatient with HD per Nephrology.  Pt instructed to follow up with PCP, Nephrology, Vacular Surgery, and Cardiology upon discharge for further evaluation. Discharge instructions and follow up reinforced with patient again via phone call.

## 2020-07-04 NOTE — PROGRESS NOTES
Coumadin Progress Notes:      Indication:Hx of AVR/afib/Mechanical AVR   INR = 2.1 today, decreased from 2.8 yesterday  Patient was previously on goal of 2-3 with coumadin clinic. Clarified with Dr. Gudino to change goal to 2.5-3.5 today and add heparin low intensity gtt for bridging.    Home dose: warfarin 15mg Thurs and Sat, and 7.5 mg all other days  Continue home dose  Coumadin clinic will be notified prior to discharge to make INR goal adjustments.    Pharmacy will continue to monitor daily PT/INR. Dose adjustments will be made accordingly.       Thank you for allowing us to participate in this patient's care.        Brenda Dickens [facility] 7/4/2020 12:02 PM

## 2020-07-04 NOTE — PLAN OF CARE
Patient remained free from injury. PRN pain meds managed pain. Cardiac monitored. Hemodialysis. No s/s of acute distress. 12hr chart check complete

## 2020-07-04 NOTE — ASSESSMENT & PLAN NOTE
H/H stable with downward trend noted in the setting of active bleeding from site  -repeat CBC in am   -will transfuse as needed- HD planned for tomorrow

## 2020-07-04 NOTE — PROGRESS NOTES
the sanguineous drainage saturates the surgical ACE wrap, ABD placed on outer ACE wrap. Dressing left in place.

## 2020-07-04 NOTE — ASSESSMENT & PLAN NOTE
Initially had bleeding at a graft site.  Bleeding has stopped, H/H stable.    Dr Gleason- Vascular Surgery consulted  -.  Fistula removal and Vascular Catheter placement 02 July with resumption of hemodialysis.  Vascular surgery to re-evaluate the wound Saturday 04 July then may return home and follow up outpatient.    -Blood cultures drawn and specimen culture of graft pending.    -Continuing Vancomycin and Cefepime.  -07/4/2020- bleeding at site with Coumadin on hold and Heparin gtt adjusted

## 2020-07-04 NOTE — PROGRESS NOTES
[facility] Hospital Medicine  Progress Note    Patient Name: Isidro White Jr.  MRN: [id number]  Patient Class: IP- Inpatient   Admission Date: 7/1/2020  Length of Stay: 2 days  Attending Physician: Kyle Wright MD  Primary Care Provider: Miguel Campo MD (Inactive)        Subjective:     Principal Problem:Arteriovenous graft infection        HPI:  Isidro White Jr. is a 50 y.o. male patient with a PMHx of Aortic valve stenosis, A-fib, CHF, ESRD, HTN,  who presents to the Emergency Department for evaluation of shunt problem which onset suddenly this Am. Pt states that he just began bleeding for no reason from his L arm shunt. Associated sxs include diaphoresis and dizzy. Patient denies any fever, chills, sore throat, cough, n/v/d, CP, SOB, HA, syncope, weakness, and all other sxs at this time. Pt states that he is on Coumadin at this time and is a MWF dialysis pt. In the ED, pt acutely bleeding and was given 2 units of emergent blood. ED physician placed several sutures and applied compression dressing. Bleed was then controlled. Dr Gleason (vascular surgery) was consulted in the ED, plans to remove access and place vascath tomorrow in OR. Labs unremarkable. Patient placed in observation for infected graft and further monitoring.       Overview/Hospital Course:  Pt admitted to Medical Surgical Unit for Arteriovenous graft infection with left graft excision per Vascular Surgery.  IV antibiotics continued.  Heparin gtt noted.  Coumadin continued with pharmacy to dose.  Increased bleeding noted with anticoagulation adjusted.  H/H stable with downward trend noted.  Dressing to LUE reinforced.  Cariology and Nephrology following.  HD planned for tomorrow.  If bleeding stable will plan for discharge post HD.      Interval History: pt verbalized stability.  H/H stable with downward trend and active bleed noted.  Heparin gtt placed on hold and Coumadin held.  Repeat INR in am with pharmacy  following.  Cardiology and Nephrology following.  Potassium normalized.  HD planned in am. Pt to discharge if bleeding stable.      Review of Systems   Constitutional: Positive for fatigue. Negative for chills and fever.   HENT: Negative.  Negative for congestion, dental problem, rhinorrhea and sore throat.    Eyes: Negative.  Negative for visual disturbance.   Respiratory: Negative.  Negative for cough, shortness of breath and wheezing.    Cardiovascular: Negative.  Negative for chest pain, palpitations and leg swelling.   Gastrointestinal: Negative for abdominal pain, diarrhea, nausea and vomiting.   Endocrine: Negative.  Negative for cold intolerance and heat intolerance.   Genitourinary: Negative.  Negative for dysuria and hematuria.   Musculoskeletal: Positive for arthralgias. Negative for gait problem and myalgias.   Skin: Negative.  Negative for rash and wound.   Allergic/Immunologic: Negative.    Neurological: Negative.  Negative for dizziness, syncope, weakness, numbness and headaches.   Hematological: Negative.  Negative for adenopathy.   Psychiatric/Behavioral: Negative.  Negative for confusion and dysphoric mood.   All other systems reviewed and are negative.    Objective:     Vital Signs (Most Recent):  Temp: 97.8 °F (36.6 °C) (07/04/20 1209)  Pulse: 83 (07/04/20 1209)  Resp: 18 (07/04/20 1344)  BP: 137/64 (07/04/20 1209)  SpO2: 97 % (07/04/20 1209) Vital Signs (24h Range):  Temp:  [97.8 °F (36.6 °C)-98.3 °F (36.8 °C)] 97.8 °F (36.6 °C)  Pulse:  [79-87] 83  Resp:  [18] 18  SpO2:  [94 %-98 %] 97 %  BP: (112-137)/(58-79) 137/64     Weight: 103 kg (227 lb 1.2 oz)  Body mass index is 29.15 kg/m².    Intake/Output Summary (Last 24 hours) at 7/4/2020 1606  Last data filed at 7/4/2020 1200  Gross per 24 hour   Intake 480 ml   Output --   Net 480 ml      Physical Exam  Constitutional:       General: He is not in acute distress.     Appearance: He is well-developed. He is not diaphoretic.   HENT:      Head:  Normocephalic and atraumatic.   Eyes:      Conjunctiva/sclera: Conjunctivae normal.      Pupils: Pupils are equal, round, and reactive to light.   Neck:      Thyroid: No thyromegaly.      Vascular: No JVD.   Cardiovascular:      Rate and Rhythm: Normal rate and regular rhythm.      Heart sounds: Normal heart sounds. No murmur. No friction rub. No gallop.       Comments: Right IJV vascular catheter in place and functional.  Pulmonary:      Effort: Pulmonary effort is normal. No respiratory distress.      Breath sounds: Normal breath sounds. No wheezing or rales.   Abdominal:      General: Bowel sounds are normal. There is no distension.      Palpations: Abdomen is soft.      Tenderness: There is no abdominal tenderness. There is no guarding or rebound.   Musculoskeletal: Normal range of motion.         General: No tenderness.      Comments: Left arm dressed and ace wrapped.  Distal radial pulse 1+.  Normal sensation and ROM left hand.   Lymphadenopathy:      Cervical: No cervical adenopathy.   Skin:     General: Skin is warm and dry.      Capillary Refill: Capillary refill takes less than 2 seconds.      Findings: No erythema or rash.   Neurological:      Mental Status: He is alert and oriented to person, place, and time.      Cranial Nerves: No cranial nerve deficit.      Deep Tendon Reflexes: Reflexes are normal and symmetric. Reflexes normal.   Psychiatric:         Behavior: Behavior normal.         Thought Content: Thought content normal.         Judgment: Judgment normal.         Significant Labs:   CBC:   Recent Labs   Lab 07/03/20  0432 07/04/20  0625 07/04/20  1237   WBC 9.95 7.07 8.20   HGB 9.7* 7.7* 8.0*   HCT 30.0* 24.5* 23.9*    121* 119*     CMP:   Recent Labs   Lab 07/03/20  0940 07/04/20  0625   * 135*   K 5.7* 4.3   CL 97 96   CO2 21* 23   GLU 96 96   BUN 74* 62*   CREATININE 14.8* 12.9*   CALCIUM 8.8 8.8   ANIONGAP 16 16   EGFRNONAA 3* 4*       Significant Imaging:   Imaging Results     None         Assessment/Plan:      * Arteriovenous graft infection  Initially had bleeding at a graft site.  Bleeding has stopped, H/H stable.    Dr Gleason- Vascular Surgery consulted  -.  Fistula removal and Vascular Catheter placement 02 July with resumption of hemodialysis.  Vascular surgery to re-evaluate the wound Saturday 04 July then may return home and follow up outpatient.    -Blood cultures drawn and specimen culture of graft pending.    -Continuing Vancomycin and Cefepime.  -07/4/2020- bleeding at site with Coumadin on hold and Heparin gtt adjusted     Anemia associated with chronic renal failure  H/H stable with downward trend noted in the setting of active bleeding from site  -repeat CBC in am   -will transfuse as needed- HD planned for tomorrow       Disorder of electrolytes  -Nephrology following   -HD planned for tomorrow   -repeat CMP in am       Bleeding from dialysis shunt  Vascular Surgery following   -active bleed continued with dressing reinforced   -Coumadin and Heparin adjusted- INR 2.1   -H/H stable with downward trend       NSVT (nonsustained ventricular tachycardia)  -Cardiology following   -electrolytes stable   -resolved       Atrial fibrillation  Coumadin on hold due to acute blood loss   -will resume Coumadin when bleeding stable   Heart rate controlled        ESRD (end stage renal disease) on dialysis  HD on M/W/F   Nephrology consulted for HD management       S/P AVR (aortic valve replacement)  -Heparin gtt   -Coumadin continued   -pharmacy to dose   -INR 2.1   -Coumadin held and Hep gtt on hold due to active bleeding at surgical site       Essential hypertension  Continue home meds   Monitor         VTE Risk Mitigation (From admission, onward)         Ordered     warfarin tablet 7.5 mg  Every Tuesday 07/02/20 1050     warfarin (COUMADIN) tablet 15 mg  Every Saturday 07/02/20 1050     heparin 25,000 units in dextrose 5% 250 mL (100 units/mL) infusion LOW INTENSITY nomogram  - [facility]  Continuous     Question:  Heparin Infusion Adjustment (DO NOT MODIFY ANSWER)  Answer:  \\[email or web address]\epic\Images\Pharmacy\HeparinInfusions\heparin LOW INTENSITY nomogram for [facility] [id number].pdf    07/04/20 1151     heparin 25,000 units in dextrose 5% (100 units/ml) IV bolus from bag - ADDITIONAL PRN BOLUS - 60 units/kg  As needed (PRN)     Question:  Heparin Infusion Adjustment (DO NOT MODIFY ANSWER)  Answer:  \\[email or web address]\epic\Images\Pharmacy\HeparinInfusions\heparin LOW INTENSITY nomogram for [facility] [id number].pdf    07/04/20 1151     heparin 25,000 units in dextrose 5% (100 units/ml) IV bolus from bag - ADDITIONAL PRN BOLUS - 30 units/kg  As needed (PRN)     Question:  Heparin Infusion Adjustment (DO NOT MODIFY ANSWER)  Answer:  \\[email or web address]\epic\Images\Pharmacy\HeparinInfusions\heparin LOW INTENSITY nomogram for [facility] [id number].pdf    07/04/20 1151     warfarin tablet 7.5 mg  Every Mon, Wed, Fri, Sun      07/02/20 1050     warfarin (COUMADIN) tablet 15 mg  Every Thursday 07/02/20 1050     IP VTE HIGH RISK PATIENT  Once      07/01/20 1340     Place sequential compression device  Until discontinued      07/01/20 1340                      Gretchen Grace NP  Department of Hospital Medicine   [facility]

## 2020-07-04 NOTE — PROGRESS NOTES
Pt was c/o drainage coming from newly placed Left shunt, I saw where the drainage was coming through the compression dressing , I re-enforced the gauze on the spot where the drainage was, I contacted Dr. Gleason who's on call for vascular, he advised me to take the dressing down and place gauze. ALEX Grace aware will continue to monitor

## 2020-07-04 NOTE — PROGRESS NOTES
[facility]   Nephrology  Progress Note    Patient Name: Isidro White Jr.  MRN: [id number]  Admission Date: 7/1/2020  Hospital Length of Stay: 2 days  Attending Provider: Kyle Wright MD   Primary Care Physician: Miguel Campo MD (Inactive)  Principal Problem:Arteriovenous graft infection    Subjective:     HPI: No notes on file    Interval History:     7/3/20: patient is currently on hemodialysis. He is tolerating the procedure well. Vitals are stable. Patient reports left arm pain.     7/4/20: patient is resting comfortably, no complaints at present.         Review of patient's allergies indicates:  No Known Allergies  Current Facility-Administered Medications   Medication Frequency    albuterol-ipratropium 2.5 mg-0.5 mg/3 mL nebulizer solution 3 mL Q6H PRN    amLODIPine tablet 10 mg Daily    calcium acetate(phosphat bind) capsule 667 mg TID    cefepime in dextrose 5 % IVPB 2 g Q24H    diltiaZEM 24 hr capsule 360 mg Daily    epoetin vikki-epbx injection 10,000 Units Once    hydrALAZINE tablet 100 mg Q8H    HYDROcodone-acetaminophen 5-325 mg per tablet 1 tablet Q4H PRN    HYDROmorphone injection 0.5 mg Q4H PRN    hydrOXYzine pamoate capsule 25 mg Q8H PRN    lisinopriL tablet 40 mg Daily    morphine injection 6 mg Q4H PRN    nozaseptin (NOZIN) nasal  BID    ondansetron injection 4 mg Q12H PRN    pantoprazole EC tablet 40 mg Daily    sodium chloride 0.9% flush 10 mL PRN    tenofovir tablet 300 mg Weekly    vancomycin - pharmacy to dose pharmacy to manage frequency    vit b cmplx 3-fa-vit c-biotin 1- mg-mg-mcg per tablet 1 tablet Daily    warfarin (COUMADIN) tablet 15 mg Every Thurs    warfarin (COUMADIN) tablet 15 mg Every Sat    warfarin tablet 7.5 mg Every Mon, Wed, Fri, Sun    [START ON 7/7/2020] warfarin tablet 7.5 mg Every Tues     Facility-Administered Medications Ordered in Other Encounters   Medication Frequency    0.9%  NaCl infusion Continuous     sodium chloride 0.9% flush 10 mL PRN       Objective:     Vital Signs (Most Recent):  Temp: 98 °F (36.7 °C) (07/04/20 0710)  Pulse: 83 (07/04/20 0710)  Resp: 18 (07/04/20 0827)  BP: 129/79 (07/04/20 0710)  SpO2: 98 % (07/04/20 0710)  O2 Device (Oxygen Therapy): room air (07/03/20 1501) Vital Signs (24h Range):  Temp:  [97.9 °F (36.6 °C)-98.6 °F (37 °C)] 98 °F (36.7 °C)  Pulse:  [77-96] 83  Resp:  [16-18] 18  SpO2:  [94 %-98 %] 98 %  BP: ()/(58-89) 129/79     Weight: 103 kg (227 lb 1.2 oz) (07/01/20 1436)  Body mass index is 29.15 kg/m².  Body surface area is 2.32 meters squared.    I/O last 3 completed shifts:  In: 860 [P.O.:360; Other:500]  Out: 2500 [Other:2500]    Physical Exam  Constitutional:       Appearance: He is well-developed.   HENT:      Head: Normocephalic.      Nose: No rhinorrhea.      Mouth/Throat:      Pharynx: No oropharyngeal exudate.   Eyes:      Pupils: Pupils are equal, round, and reactive to light.   Neck:      Thyroid: No thyroid mass.   Cardiovascular:      Rate and Rhythm: Normal rate and regular rhythm.      Heart sounds: S1 normal and S2 normal.   Pulmonary:      Effort: Pulmonary effort is normal. No respiratory distress.      Breath sounds: No wheezing.   Abdominal:      General: Bowel sounds are normal. There is no distension.      Palpations: Abdomen is soft.      Tenderness: There is no abdominal tenderness.      Hernia: No hernia is present.   Musculoskeletal:      Comments: Left arm dressed and wrapped.    Lymphadenopathy:      Cervical: No cervical adenopathy.   Skin:     General: Skin is warm and dry.   Neurological:      Mental Status: He is alert and oriented to person, place, and time.   Psychiatric:         Behavior: Behavior is cooperative.         Significant Labs:  Lab Results   Component Value Date    CREATININE 12.9 (H) 07/04/2020    BUN 62 (H) 07/04/2020     (L) 07/04/2020    K 4.3 07/04/2020    CL 96 07/04/2020    CO2 23 07/04/2020     Lab Results    Component Value Date    .0 (H) 01/08/2019    CALCIUM 8.8 07/04/2020    PHOS 8.3 (H) 07/04/2020     Lab Results   Component Value Date    ALBUMIN 3.4 (L) 07/01/2020     Lab Results   Component Value Date    WBC 7.07 07/04/2020    HGB 7.7 (L) 07/04/2020    HCT 24.5 (L) 07/04/2020     (H) 07/04/2020     (L) 07/04/2020       No results for input(s): MG in the last 168 hours.      Significant Imaging:  Imaging Results    None           Assessment/Plan:     * Arteriovenous graft infection  Patient presented with infected left arm AVG which was removed on 7/2/20. Continue antibiotics.     Anemia associated with chronic renal failure  Epogen with HD.     Disorder of electrolytes  Hyperkalemia: has resolved.     Hyperphosphatemia: will double Phoslo dose.     ESRD (end stage renal disease) on dialysis  Patient on MWF schedule at [facility].     Next scheduled HD Mon. 7/6.     Access: right IJ vascath. Infected left AVG was removed on 7/2/20.     Essential hypertension  Good BP control today.        Thank you for your consult. I will follow-up with patient. Please contact us if you have any additional questions.    Jacek Ralph MD  Nephrology  [facility]

## 2020-07-04 NOTE — PLAN OF CARE
Pt remain free falls and injuries this shift, pt is resting comfortably with call light within reach, pt dressing will be taken down per MD Gleason will continue to monitor.

## 2020-07-04 NOTE — SUBJECTIVE & OBJECTIVE
Interval History: pt verbalized stability.  H/H stable with downward trend and active bleed noted.  Heparin gtt placed on hold and Coumadin held.  Repeat INR in am with pharmacy following.  Cardiology and Nephrology following.  Potassium normalized.  HD planned in am. Pt to discharge if bleeding stable.      Review of Systems   Constitutional: Positive for fatigue. Negative for chills and fever.   HENT: Negative.  Negative for congestion, dental problem, rhinorrhea and sore throat.    Eyes: Negative.  Negative for visual disturbance.   Respiratory: Negative.  Negative for cough, shortness of breath and wheezing.    Cardiovascular: Negative.  Negative for chest pain, palpitations and leg swelling.   Gastrointestinal: Negative for abdominal pain, diarrhea, nausea and vomiting.   Endocrine: Negative.  Negative for cold intolerance and heat intolerance.   Genitourinary: Negative.  Negative for dysuria and hematuria.   Musculoskeletal: Positive for arthralgias. Negative for gait problem and myalgias.   Skin: Negative.  Negative for rash and wound.   Allergic/Immunologic: Negative.    Neurological: Negative.  Negative for dizziness, syncope, weakness, numbness and headaches.   Hematological: Negative.  Negative for adenopathy.   Psychiatric/Behavioral: Negative.  Negative for confusion and dysphoric mood.   All other systems reviewed and are negative.    Objective:     Vital Signs (Most Recent):  Temp: 97.8 °F (36.6 °C) (07/04/20 1209)  Pulse: 83 (07/04/20 1209)  Resp: 18 (07/04/20 1344)  BP: 137/64 (07/04/20 1209)  SpO2: 97 % (07/04/20 1209) Vital Signs (24h Range):  Temp:  [97.8 °F (36.6 °C)-98.3 °F (36.8 °C)] 97.8 °F (36.6 °C)  Pulse:  [79-87] 83  Resp:  [18] 18  SpO2:  [94 %-98 %] 97 %  BP: (112-137)/(58-79) 137/64     Weight: 103 kg (227 lb 1.2 oz)  Body mass index is 29.15 kg/m².    Intake/Output Summary (Last 24 hours) at 7/4/2020 1606  Last data filed at 7/4/2020 1200  Gross per 24 hour   Intake 480 ml   Output --    Net 480 ml      Physical Exam  Constitutional:       General: He is not in acute distress.     Appearance: He is well-developed. He is not diaphoretic.   HENT:      Head: Normocephalic and atraumatic.   Eyes:      Conjunctiva/sclera: Conjunctivae normal.      Pupils: Pupils are equal, round, and reactive to light.   Neck:      Thyroid: No thyromegaly.      Vascular: No JVD.   Cardiovascular:      Rate and Rhythm: Normal rate and regular rhythm.      Heart sounds: Normal heart sounds. No murmur. No friction rub. No gallop.       Comments: Right IJV vascular catheter in place and functional.  Pulmonary:      Effort: Pulmonary effort is normal. No respiratory distress.      Breath sounds: Normal breath sounds. No wheezing or rales.   Abdominal:      General: Bowel sounds are normal. There is no distension.      Palpations: Abdomen is soft.      Tenderness: There is no abdominal tenderness. There is no guarding or rebound.   Musculoskeletal: Normal range of motion.         General: No tenderness.      Comments: Left arm dressed and ace wrapped.  Distal radial pulse 1+.  Normal sensation and ROM left hand.   Lymphadenopathy:      Cervical: No cervical adenopathy.   Skin:     General: Skin is warm and dry.      Capillary Refill: Capillary refill takes less than 2 seconds.      Findings: No erythema or rash.   Neurological:      Mental Status: He is alert and oriented to person, place, and time.      Cranial Nerves: No cranial nerve deficit.      Deep Tendon Reflexes: Reflexes are normal and symmetric. Reflexes normal.   Psychiatric:         Behavior: Behavior normal.         Thought Content: Thought content normal.         Judgment: Judgment normal.         Significant Labs:   CBC:   Recent Labs   Lab 07/03/20  0432 07/04/20  0625 07/04/20  1237   WBC 9.95 7.07 8.20   HGB 9.7* 7.7* 8.0*   HCT 30.0* 24.5* 23.9*    121* 119*     CMP:   Recent Labs   Lab 07/03/20  0940 07/04/20  0625   * 135*   K 5.7* 4.3   CL  97 96   CO2 21* 23   GLU 96 96   BUN 74* 62*   CREATININE 14.8* 12.9*   CALCIUM 8.8 8.8   ANIONGAP 16 16   EGFRNONAA 3* 4*       Significant Imaging:   Imaging Results    None

## 2020-07-04 NOTE — ASSESSMENT & PLAN NOTE
Vascular Surgery following   -active bleed continued with dressing reinforced   -Coumadin and Heparin adjusted- INR 2.1   -H/H stable with downward trend

## 2020-07-04 NOTE — ASSESSMENT & PLAN NOTE
Patient on MWF schedule at [facility].     Next scheduled HD Mon. 7/6.     Access: right IJ vascath. Infected left AVG was removed on 7/2/20.

## 2020-07-05 LAB
ABO + RH BLD: NORMAL
ANION GAP SERPL CALC-SCNC: 19 MMOL/L (ref 8–16)
BASOPHILS # BLD AUTO: 0.02 K/UL (ref 0–0.2)
BASOPHILS # BLD AUTO: 0.02 K/UL (ref 0–0.2)
BASOPHILS NFR BLD: 0.3 % (ref 0–1.9)
BASOPHILS NFR BLD: 0.3 % (ref 0–1.9)
BLD GP AB SCN CELLS X3 SERPL QL: NORMAL
BUN SERPL-MCNC: 83 MG/DL (ref 6–20)
CALCIUM SERPL-MCNC: 8.7 MG/DL (ref 8.7–10.5)
CHLORIDE SERPL-SCNC: 99 MMOL/L (ref 95–110)
CO2 SERPL-SCNC: 20 MMOL/L (ref 23–29)
CREAT SERPL-MCNC: 15.7 MG/DL (ref 0.5–1.4)
DIFFERENTIAL METHOD: ABNORMAL
DIFFERENTIAL METHOD: ABNORMAL
EOSINOPHIL # BLD AUTO: 0.2 K/UL (ref 0–0.5)
EOSINOPHIL # BLD AUTO: 0.2 K/UL (ref 0–0.5)
EOSINOPHIL NFR BLD: 2.4 % (ref 0–8)
EOSINOPHIL NFR BLD: 2.4 % (ref 0–8)
ERYTHROCYTE [DISTWIDTH] IN BLOOD BY AUTOMATED COUNT: 13 % (ref 11.5–14.5)
ERYTHROCYTE [DISTWIDTH] IN BLOOD BY AUTOMATED COUNT: 13 % (ref 11.5–14.5)
EST. GFR  (AFRICAN AMERICAN): 4 ML/MIN/1.73 M^2
EST. GFR  (NON AFRICAN AMERICAN): 3 ML/MIN/1.73 M^2
GLUCOSE SERPL-MCNC: 88 MG/DL (ref 70–110)
HCT VFR BLD AUTO: 20.9 % (ref 40–54)
HCT VFR BLD AUTO: 20.9 % (ref 40–54)
HGB BLD-MCNC: 6.9 G/DL (ref 14–18)
HGB BLD-MCNC: 6.9 G/DL (ref 14–18)
IMM GRANULOCYTES # BLD AUTO: 0.02 K/UL (ref 0–0.04)
IMM GRANULOCYTES # BLD AUTO: 0.02 K/UL (ref 0–0.04)
IMM GRANULOCYTES NFR BLD AUTO: 0.3 % (ref 0–0.5)
IMM GRANULOCYTES NFR BLD AUTO: 0.3 % (ref 0–0.5)
INR PPP: 2 (ref 0.8–1.2)
LYMPHOCYTES # BLD AUTO: 1.3 K/UL (ref 1–4.8)
LYMPHOCYTES # BLD AUTO: 1.3 K/UL (ref 1–4.8)
LYMPHOCYTES NFR BLD: 17.4 % (ref 18–48)
LYMPHOCYTES NFR BLD: 17.4 % (ref 18–48)
MCH RBC QN AUTO: 34.8 PG (ref 27–31)
MCH RBC QN AUTO: 34.8 PG (ref 27–31)
MCHC RBC AUTO-ENTMCNC: 33 G/DL (ref 32–36)
MCHC RBC AUTO-ENTMCNC: 33 G/DL (ref 32–36)
MCV RBC AUTO: 106 FL (ref 82–98)
MCV RBC AUTO: 106 FL (ref 82–98)
MONOCYTES # BLD AUTO: 1.2 K/UL (ref 0.3–1)
MONOCYTES # BLD AUTO: 1.2 K/UL (ref 0.3–1)
MONOCYTES NFR BLD: 16.3 % (ref 4–15)
MONOCYTES NFR BLD: 16.3 % (ref 4–15)
NEUTROPHILS # BLD AUTO: 4.7 K/UL (ref 1.8–7.7)
NEUTROPHILS # BLD AUTO: 4.7 K/UL (ref 1.8–7.7)
NEUTROPHILS NFR BLD: 63.3 % (ref 38–73)
NEUTROPHILS NFR BLD: 63.3 % (ref 38–73)
NRBC BLD-RTO: 0 /100 WBC
NRBC BLD-RTO: 0 /100 WBC
PHOSPHATE SERPL-MCNC: 7.9 MG/DL (ref 2.7–4.5)
PLATELET # BLD AUTO: 116 K/UL (ref 150–350)
PLATELET # BLD AUTO: 116 K/UL (ref 150–350)
PMV BLD AUTO: 11.4 FL (ref 9.2–12.9)
PMV BLD AUTO: 11.4 FL (ref 9.2–12.9)
POTASSIUM SERPL-SCNC: 4.7 MMOL/L (ref 3.5–5.1)
PROTHROMBIN TIME: 20.8 SEC (ref 9–12.5)
RBC # BLD AUTO: 1.98 M/UL (ref 4.6–6.2)
RBC # BLD AUTO: 1.98 M/UL (ref 4.6–6.2)
SODIUM SERPL-SCNC: 138 MMOL/L (ref 136–145)
WBC # BLD AUTO: 7.47 K/UL (ref 3.9–12.7)
WBC # BLD AUTO: 7.47 K/UL (ref 3.9–12.7)

## 2020-07-05 PROCEDURE — 99233 SBSQ HOSP IP/OBS HIGH 50: CPT | Mod: ,,, | Performed by: INTERNAL MEDICINE

## 2020-07-05 PROCEDURE — 86850 RBC ANTIBODY SCREEN: CPT

## 2020-07-05 PROCEDURE — 86920 COMPATIBILITY TEST SPIN: CPT

## 2020-07-05 PROCEDURE — 25000003 PHARM REV CODE 250: Performed by: NURSE PRACTITIONER

## 2020-07-05 PROCEDURE — 63600175 PHARM REV CODE 636 W HCPCS: Performed by: SURGERY

## 2020-07-05 PROCEDURE — 99233 PR SUBSEQUENT HOSPITAL CARE,LEVL III: ICD-10-PCS | Mod: ,,, | Performed by: INTERNAL MEDICINE

## 2020-07-05 PROCEDURE — 25000003 PHARM REV CODE 250: Performed by: SURGERY

## 2020-07-05 PROCEDURE — 80048 BASIC METABOLIC PNL TOTAL CA: CPT

## 2020-07-05 PROCEDURE — 36415 COLL VENOUS BLD VENIPUNCTURE: CPT

## 2020-07-05 PROCEDURE — P9016 RBC LEUKOCYTES REDUCED: HCPCS

## 2020-07-05 PROCEDURE — 84100 ASSAY OF PHOSPHORUS: CPT

## 2020-07-05 PROCEDURE — 11000001 HC ACUTE MED/SURG PRIVATE ROOM

## 2020-07-05 PROCEDURE — 85610 PROTHROMBIN TIME: CPT

## 2020-07-05 PROCEDURE — 85025 COMPLETE CBC W/AUTO DIFF WBC: CPT

## 2020-07-05 PROCEDURE — 25000003 PHARM REV CODE 250: Performed by: INTERNAL MEDICINE

## 2020-07-05 PROCEDURE — 36430 TRANSFUSION BLD/BLD COMPNT: CPT

## 2020-07-05 PROCEDURE — 21400001 HC TELEMETRY ROOM

## 2020-07-05 RX ORDER — SODIUM CHLORIDE 9 MG/ML
INJECTION, SOLUTION INTRAVENOUS ONCE
Status: DISCONTINUED | OUTPATIENT
Start: 2020-07-05 | End: 2020-07-06 | Stop reason: HOSPADM

## 2020-07-05 RX ORDER — MUPIROCIN 20 MG/G
OINTMENT TOPICAL 2 TIMES DAILY
Qty: 15 G | Refills: 0 | Status: SHIPPED | OUTPATIENT
Start: 2020-07-05 | End: 2020-07-12

## 2020-07-05 RX ORDER — POLYETHYLENE GLYCOL 3350 17 G/17G
17 POWDER, FOR SOLUTION ORAL DAILY
Qty: 30 EACH | Refills: 0 | Status: SHIPPED | OUTPATIENT
Start: 2020-07-05 | End: 2020-08-04

## 2020-07-05 RX ORDER — SODIUM CHLORIDE 9 MG/ML
INJECTION, SOLUTION INTRAVENOUS
Status: DISCONTINUED | OUTPATIENT
Start: 2020-07-05 | End: 2020-07-06 | Stop reason: HOSPADM

## 2020-07-05 RX ORDER — HYDROCODONE BITARTRATE AND ACETAMINOPHEN 500; 5 MG/1; MG/1
TABLET ORAL
Status: DISCONTINUED | OUTPATIENT
Start: 2020-07-05 | End: 2020-07-06 | Stop reason: HOSPADM

## 2020-07-05 RX ORDER — POLYETHYLENE GLYCOL 3350 17 G/17G
17 POWDER, FOR SOLUTION ORAL DAILY
Status: DISCONTINUED | OUTPATIENT
Start: 2020-07-05 | End: 2020-07-06 | Stop reason: HOSPADM

## 2020-07-05 RX ORDER — MUPIROCIN 20 MG/G
OINTMENT TOPICAL 2 TIMES DAILY
Status: DISCONTINUED | OUTPATIENT
Start: 2020-07-05 | End: 2020-07-06

## 2020-07-05 RX ORDER — POLYETHYLENE GLYCOL 3350 17 G/17G
17 POWDER, FOR SOLUTION ORAL DAILY
Status: DISCONTINUED | OUTPATIENT
Start: 2020-07-06 | End: 2020-07-05 | Stop reason: SDUPTHER

## 2020-07-05 RX ORDER — DOCUSATE SODIUM 100 MG/1
100 CAPSULE, LIQUID FILLED ORAL DAILY
Status: DISCONTINUED | OUTPATIENT
Start: 2020-07-05 | End: 2020-07-06 | Stop reason: HOSPADM

## 2020-07-05 RX ORDER — CALCIUM ACETATE 667 MG/1
1334 CAPSULE ORAL 3 TIMES DAILY
Qty: 180 CAPSULE | Refills: 0 | Status: SHIPPED | OUTPATIENT
Start: 2020-07-05 | End: 2020-10-02 | Stop reason: SDUPTHER

## 2020-07-05 RX ORDER — DOCUSATE SODIUM 100 MG/1
100 CAPSULE, LIQUID FILLED ORAL 2 TIMES DAILY
Qty: 60 CAPSULE | Refills: 0
Start: 2020-07-05 | End: 2020-08-04

## 2020-07-05 RX ADMIN — CEFEPIME HYDROCHLORIDE 2 G: 2 INJECTION, SOLUTION INTRAVENOUS at 03:07

## 2020-07-05 RX ADMIN — MUPIROCIN: 20 OINTMENT TOPICAL at 09:07

## 2020-07-05 RX ADMIN — HYDROMORPHONE HYDROCHLORIDE 0.5 MG: 1 INJECTION, SOLUTION INTRAMUSCULAR; INTRAVENOUS; SUBCUTANEOUS at 11:07

## 2020-07-05 RX ADMIN — HYDROCODONE BITARTRATE AND ACETAMINOPHEN 1 TABLET: 5; 325 TABLET ORAL at 09:07

## 2020-07-05 RX ADMIN — HYDROXYZINE PAMOATE 25 MG: 25 CAPSULE ORAL at 05:07

## 2020-07-05 RX ADMIN — CALCIUM ACETATE 1334 MG: 667 CAPSULE ORAL at 09:07

## 2020-07-05 RX ADMIN — DILTIAZEM HYDROCHLORIDE 360 MG: 180 CAPSULE, COATED, EXTENDED RELEASE ORAL at 09:07

## 2020-07-05 RX ADMIN — PANTOPRAZOLE SODIUM 40 MG: 40 TABLET, DELAYED RELEASE ORAL at 09:07

## 2020-07-05 RX ADMIN — DOCUSATE SODIUM 100 MG: 100 CAPSULE, LIQUID FILLED ORAL at 05:07

## 2020-07-05 RX ADMIN — HYDRALAZINE HYDROCHLORIDE 100 MG: 50 TABLET, FILM COATED ORAL at 09:07

## 2020-07-05 RX ADMIN — WARFARIN SODIUM 7.5 MG: 2.5 TABLET ORAL at 05:07

## 2020-07-05 RX ADMIN — MUPIROCIN: 20 OINTMENT TOPICAL at 11:07

## 2020-07-05 RX ADMIN — POLYETHYLENE GLYCOL 3350 17 G: 17 POWDER, FOR SOLUTION ORAL at 05:07

## 2020-07-05 RX ADMIN — HYDROCODONE BITARTRATE AND ACETAMINOPHEN 1 TABLET: 5; 325 TABLET ORAL at 01:07

## 2020-07-05 RX ADMIN — CALCIUM ACETATE 1334 MG: 667 CAPSULE ORAL at 03:07

## 2020-07-05 RX ADMIN — HYDRALAZINE HYDROCHLORIDE 100 MG: 50 TABLET, FILM COATED ORAL at 05:07

## 2020-07-05 RX ADMIN — HYDRALAZINE HYDROCHLORIDE 100 MG: 50 TABLET, FILM COATED ORAL at 01:07

## 2020-07-05 RX ADMIN — B-COMPLEX W/ C & FOLIC ACID TAB 1 MG 1 TABLET: 1 TAB at 09:07

## 2020-07-05 RX ADMIN — HYDROCODONE BITARTRATE AND ACETAMINOPHEN 1 TABLET: 5; 325 TABLET ORAL at 05:07

## 2020-07-05 RX ADMIN — LISINOPRIL 40 MG: 20 TABLET ORAL at 09:07

## 2020-07-05 RX ADMIN — AMLODIPINE BESYLATE 10 MG: 10 TABLET ORAL at 09:07

## 2020-07-05 NOTE — ASSESSMENT & PLAN NOTE
-Heparin gtt stopped   -Coumadin continued   -pharmacy to dose   -INR 2  -bleeding at AVG site controlled

## 2020-07-05 NOTE — ASSESSMENT & PLAN NOTE
Initially had bleeding at a graft site.  Bleeding has stopped, H/H stable.    Dr Gleason- Vascular Surgery consulted  -.  Fistula removal and Vascular Catheter placement 02 July with resumption of hemodialysis.  Vascular surgery to re-evaluate the wound Saturday 04 July then may return home and follow up outpatient.    -Blood cultures drawn and specimen culture of graft pending.    -Continuing Vancomycin and Cefepime.  -07/4/2020- bleeding at site with Coumadin on hold and Heparin gtt adjusted   -7/5/2020- no bleeding appreciated to site- Coumadin resumed

## 2020-07-05 NOTE — PLAN OF CARE
Remains free from harm, pain controlled via PO pain meds, refuses positioning, will continue to monitor. 24 hour chart check complete.

## 2020-07-05 NOTE — ASSESSMENT & PLAN NOTE
H/H stable with downward trend noted in the setting of active bleeding from site  -PRBCs x 1 unit given   -repeat CBC in am   -will transfuse as needed- HD planned for tomorrow

## 2020-07-05 NOTE — ASSESSMENT & PLAN NOTE
-History of mechanical AVR  -INR 3.2 --> 2.8  -Bridge with heparin gtt if INR < 2.0 due to AVG bleeding/oozing; pharmacy consulted   Restart coumadin

## 2020-07-05 NOTE — ASSESSMENT & PLAN NOTE
-History of mechanical AVR  -INR 3.2 --> 2.8  -Bridge with heparin gtt if INR < 2.0 due to AVG bleeding/oozing; pharmacy consulted

## 2020-07-05 NOTE — PROGRESS NOTES
[facility] -   Nephrology  Progress Note    Patient Name: Isidro White Jr.  MRN: [id number]  Admission Date: 7/1/2020  Hospital Length of Stay: 3 days  Attending Provider: Kyle Wright MD   Primary Care Physician: Miguel Campo MD (Inactive)  Principal Problem:Arteriovenous graft infection    Subjective:     HPI: No notes on file    Interval History:     7/3/20: patient is currently on hemodialysis. He is tolerating the procedure well. Vitals are stable. Patient reports left arm pain.     7/4/20: patient is resting comfortably, no complaints at present.     7/5/20: patient still with left arm pain.           Review of patient's allergies indicates:  No Known Allergies  Current Facility-Administered Medications   Medication Frequency    0.9%  NaCl infusion (for blood administration) Q24H PRN    albuterol-ipratropium 2.5 mg-0.5 mg/3 mL nebulizer solution 3 mL Q6H PRN    amLODIPine tablet 10 mg Daily    calcium acetate(phosphat bind) capsule 1,334 mg TID    cefepime in dextrose 5 % IVPB 2 g Q24H    diltiaZEM 24 hr capsule 360 mg Daily    epoetin vikki-epbx injection 10,000 Units Once    hydrALAZINE tablet 100 mg Q8H    HYDROcodone-acetaminophen 5-325 mg per tablet 1 tablet Q4H PRN    HYDROmorphone injection 0.5 mg Q4H PRN    hydrOXYzine pamoate capsule 25 mg Q8H PRN    lisinopriL tablet 40 mg Daily    morphine injection 6 mg Q4H PRN    nozaseptin (NOZIN) nasal  BID    ondansetron injection 4 mg Q12H PRN    pantoprazole EC tablet 40 mg Daily    sodium chloride 0.9% flush 10 mL PRN    tenofovir tablet 300 mg Weekly    vancomycin - pharmacy to dose pharmacy to manage frequency    vit b cmplx 3-fa-vit c-biotin 1- mg-mg-mcg per tablet 1 tablet Daily    warfarin (COUMADIN) tablet 15 mg Every Thurs    [START ON 7/11/2020] warfarin (COUMADIN) tablet 15 mg Every Sat    warfarin tablet 7.5 mg Every Mon, Wed, Fri, Sun    [START ON 7/7/2020] warfarin tablet 7.5 mg  Every Tues     Facility-Administered Medications Ordered in Other Encounters   Medication Frequency    0.9%  NaCl infusion Continuous    sodium chloride 0.9% flush 10 mL PRN       Objective:     Vital Signs (Most Recent):  Temp: 97.8 °F (36.6 °C) (07/05/20 0708)  Pulse: 87 (07/05/20 0708)  Resp: 16 (07/05/20 0939)  BP: 129/64 (07/05/20 0708)  SpO2: 96 % (07/05/20 0708)  O2 Device (Oxygen Therapy): room air (07/05/20 0708) Vital Signs (24h Range):  Temp:  [97.8 °F (36.6 °C)-98.7 °F (37.1 °C)] 97.8 °F (36.6 °C)  Pulse:  [80-99] 87  Resp:  [14-20] 16  SpO2:  [93 %-98 %] 96 %  BP: (119-137)/(58-72) 129/64     Weight: 103 kg (227 lb 1.2 oz) (07/01/20 1436)  Body mass index is 29.15 kg/m².  Body surface area is 2.32 meters squared.    I/O last 3 completed shifts:  In: 1350 [P.O.:1200; IV Piggyback:150]  Out: 300 [Urine:300]    Physical Exam  Constitutional:       Appearance: He is well-developed.   HENT:      Head: Normocephalic.      Nose: No rhinorrhea.      Mouth/Throat:      Pharynx: No oropharyngeal exudate.   Eyes:      Pupils: Pupils are equal, round, and reactive to light.   Neck:      Thyroid: No thyroid mass.   Cardiovascular:      Rate and Rhythm: Normal rate and regular rhythm.      Heart sounds: S1 normal and S2 normal.   Pulmonary:      Effort: Pulmonary effort is normal. No respiratory distress.      Breath sounds: No wheezing.   Abdominal:      General: Bowel sounds are normal. There is no distension.      Palpations: Abdomen is soft.      Tenderness: There is no abdominal tenderness.      Hernia: No hernia is present.   Musculoskeletal:      Comments: Left arm dressed and wrapped.    Lymphadenopathy:      Cervical: No cervical adenopathy.   Skin:     General: Skin is warm and dry.   Neurological:      Mental Status: He is alert and oriented to person, place, and time.   Psychiatric:         Behavior: Behavior is cooperative.         Significant Labs:  Lab Results   Component Value Date    CREATININE  15.7 (H) 07/05/2020    BUN 83 (H) 07/05/2020     07/05/2020    K 4.7 07/05/2020    CL 99 07/05/2020    CO2 20 (L) 07/05/2020     Lab Results   Component Value Date    .0 (H) 01/08/2019    CALCIUM 8.7 07/05/2020    PHOS 7.9 (H) 07/05/2020     Lab Results   Component Value Date    ALBUMIN 3.4 (L) 07/01/2020     Lab Results   Component Value Date    WBC 7.47 07/05/2020    WBC 7.47 07/05/2020    HGB 6.9 (L) 07/05/2020    HGB 6.9 (L) 07/05/2020    HCT 20.9 (L) 07/05/2020    HCT 20.9 (L) 07/05/2020     (H) 07/05/2020     (H) 07/05/2020     (L) 07/05/2020     (L) 07/05/2020       No results for input(s): MG in the last 168 hours.      Significant Imaging:  Imaging Results    None           Assessment/Plan:     * Arteriovenous graft infection  Patient presented with infected left arm AVG which was removed on 7/2/20. Continue antibiotics.     Anemia associated with chronic renal failure  Epogen with HD.     Disorder of electrolytes  Hyperkalemia: has resolved.     Hyperphosphatemia: Phoslo dose was doubled.     ESRD (end stage renal disease) on dialysis  Patient on MWF schedule at [facility].     Next scheduled HD tomorrow.     Access: right IJ vascath. Infected left AVG was removed on 7/2/20.     Essential hypertension  Good BP control today.        Thank you for your consult. I will follow-up with patient. Please contact us if you have any additional questions.    Jacek Ralph MD  Nephrology  [facility] -

## 2020-07-05 NOTE — PROGRESS NOTES
[facility]  Cardiology  Progress Note    Patient Name: Isidro White Jr.  MRN: [id number]  Admission Date: 7/1/2020  Hospital Length of Stay: 3 days  Code Status: Full Code   Attending Physician: Kyle Wright MD   Primary Care Physician: Miguel Campo MD (Inactive)  Expected Discharge Date:   Principal Problem:Arteriovenous graft infection    Subjective:     Hospital Course:   Mr. White is a 50 year old male aptient whose current medical conditions include ASD (s/p closure), HFpEF, s/p AVR (mechanical) and TV annuloplasty), HTN, PHTN, SVT (s/p RFA 3/17), and ESRD on HD who presented to [facility] ED yesterday with a chief complaint of bleeding from his L arm shunt. Associated symptoms included dizziness and diaphoresis. Patient denied any associated iesha chest pain, fever, chills, palpitations, near syncope, or syncope. While in ED, patient continued to have active bleeding which required suture placement and compression dressing. Initial workup revealed H/H of 10.8/32.0 and patient was subsequently admitted for further evaluation and treatment. Overnight, patient reportedly had runs of NSVT and cardiology consulted to assist with management. Patient seen and examined today, F. Remains chest pain free. He reports compliance with his medications. On long term AC (Coumadin) due to history of mechanical AVR. Chart reviewed. H/H has remained stable. INR 3.2 yesterday. Echo 3/20 showed normal EF. Stress 3/20 negative for ischemia. Vascular surgery on board and planning on access removal and Vas-cath placement.        Reviewed telemetry strips---NSVT confirmed.    7/3/20  NSVT occurred more freq today. Increased Labetolol to 200mg BID, continue on Cardizem 360. No CP, occ palpitations. Getting HD today.     7/4/20  Had more oozing from L arm AVG site with drop in H&H - 7.7, INR 2.1; discussed starting heparin drip and coumadin but on hold due to bleeding, will continue to monitor but discussed if  INR < 2.0 will need to be on heparin bridge while INR becomes therapeutic. No CP, NSVT improved with increase BB.     7/5/20 - L arm AVG dressing changed last night, Hbg 6.9 getting transfused today, INR 2.0, heparin and coumadin off now, will restart tomorrow and follow up with coumadin clinic. No CP/SOB.     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     HD M, W, F    ESRD on dialysis     Hepatitis B     Hyperlipidemia     Hypertension     DANIEL (obstructive sleep apnea) 11/12/2019    Secondary hyperparathyroidism of renal origin     Supraventricular tachycardia     atrial tachycardia    Tachycardia     Valvular regurgitation     AI, TR       Past Surgical History:   Procedure Laterality Date    ASD REPAIR      age 8 [facility]    AV Graft Creation Left 03/2017    [facility]      [facility]     CARDIAC VALVE SURGERY  02/08/2017    22 mm Medtronic AV, 28 mm TV Medtronic annuloplaty ring    COLONOSCOPY N/A 8/27/2019    Procedure: COLONOSCOPY;  Surgeon: Addie John MD;  Location: [facility];  Service: Endoscopy;  Laterality: N/A;  dialysis pt *needs K*    ESOPHAGOGASTRODUODENOSCOPY N/A 8/27/2019    Procedure: EGD (ESOPHAGOGASTRODUODENOSCOPY);  Surgeon: Addie John MD;  Location: [facility];  Service: Endoscopy;  Laterality: N/A;    RADIOFREQUENCY ABLATION  03/13/2017    Atrial tachycardia       Review of patient's allergies indicates:  No Known Allergies    Current Facility-Administered Medications on File Prior to Encounter   Medication    0.9%  NaCl infusion    sodium chloride 0.9% flush 10 mL     Current Outpatient Medications on File Prior to Encounter   Medication Sig    labetalol (NORMODYNE) 100 MG tablet TAKE ONE TABLET BY MOUTH THREE TIMES DAILY AS NEEDED  FOR HEART RATE > 120    albuterol (VENTOLIN HFA) 90 mcg/actuation inhaler Inhale 2 puffs into the lungs every 6 (six) hours as needed for Wheezing or Shortness of Breath. Rescue    amLODIPine (NORVASC) 10 MG tablet TAKE ONE TABLET BY MOUTH EVERY DAY    amoxicillin (AMOXIL) 500 MG capsule Take 1 capsule (500 mg total) by mouth 3 (three) times daily x 14 days    ascorbic acid, vitamin C, (VITAMIN C) 500 MG tablet Take 1 tablet (500 mg total) by mouth 2 (two) times daily.    b complex vitamins tablet Take 1 tablet by mouth once daily.    calcium acetate (PHOSLO) 667 mg capsule Take 1 capsule (667 mg total) by mouth 3 (three) times daily.    diltiaZEM (CARDIZEM CD) 360 MG 24 hr capsule Take 1 capsule (360 mg total) by mouth once daily.    enoxaparin (LOVENOX) 100 mg/mL Syrg Inject 0.9 mLs (90 mg total) into the skin once daily.    epoetin vikki (PROCRIT) 10,000 unit/mL injection Inject 1 mL (10,000 Units total) into the skin every Mon, Wed, Fri. To be given with HD    fish oil-omega-3 fatty acids 300-1,000 mg capsule Take 2 g by mouth once daily.    hydrALAZINE (APRESOLINE) 100 MG tablet TAKE ONE TABLET BY MOUTH THREE TIMES DAILY INCLUDING DIALYSIS DAYS    hydrOXYzine pamoate (VISTARIL) 25 MG Cap T1C PO QD PRN ITCHING    lisinopril (PRINIVIL,ZESTRIL) 40 MG tablet TAKE ONE TABLET BY MOUTH EVERY DAY    multivitamin with minerals tablet Take 1 tablet by mouth once daily.    omega-3 fatty acids-vitamin E (FISH OIL) 1,000 mg Cap Take 1 capsule by mouth once daily.    pantoprazole (PROTONIX) 40 MG tablet TAKE ONE TABLET BY MOUTH EVERY DAY    ETHAN-RACQUEL RX 1- mg-mg-mcg Tab TAKE ONE TABLET BY MOUTH EVERY DAY    tenofovir (VIREAD) 300 mg Tab Take 1 tablet (300 mg total) by mouth once a week.    vitamin D (VITAMIN D3) 1000 units Tab Take 1,000 Units by mouth 3 (three) times a week.    warfarin (COUMADIN) 7.5 MG tablet Take 2 tablets by mouth on Thursdays and 1 tablet on all other days of the week. (Patient  taking differently: Take 2 tablets by mouth on Thursdays and Saturday; and 1 tablet on all other days of the week.)     Family History     Problem Relation (Age of Onset)    Anesthesia problems Paternal Uncle    Diabetes Paternal Aunt, Paternal Aunt    Heart attack Father    Heart failure Paternal Grandmother    Hypertension Paternal Aunt    Leukemia Mother, Paternal Aunt    No Known Problems Sister, Brother, Sister, Sister    Stroke Paternal Aunt    Suicide Paternal Uncle    Valvular heart disease Maternal Aunt        Tobacco Use    Smoking status: Never Smoker    Smokeless tobacco: Never Used   Substance and Sexual Activity    Alcohol use: No     Frequency: Never     Comment: quit in 2016; does have heavy drinking history; started drinking at age 13; was drinking a 12 pack over the weekend and two 24 ounces of beer a day during the week along with a pint of hard alcohol    Drug use: No    Sexual activity: Not on file     Review of Systems   Constitution: Positive for malaise/fatigue.   HENT: Negative.    Eyes: Negative.    Cardiovascular: Negative.    Respiratory: Negative.    Endocrine: Negative.    Hematologic/Lymphatic: Bruises/bleeds easily.   Skin: Negative.    Musculoskeletal: Positive for joint swelling (left arm).   Gastrointestinal: Negative.    Genitourinary: Negative.    Neurological: Negative.    Psychiatric/Behavioral: Negative.    Allergic/Immunologic: Negative.      Objective:     Vital Signs (Most Recent):  Temp: 97.8 °F (36.6 °C) (07/05/20 1634)  Pulse: 86 (07/05/20 1634)  Resp: 18 (07/05/20 1634)  BP: 136/68 (07/05/20 1634)  SpO2: 95 % (07/05/20 1634) Vital Signs (24h Range):  Temp:  [97.8 °F (36.6 °C)-98.4 °F (36.9 °C)] 97.8 °F (36.6 °C)  Pulse:  [80-95] 86  Resp:  [14-18] 18  SpO2:  [93 %-98 %] 95 %  BP: (119-172)/(58-74) 136/68     Weight: 103 kg (227 lb 1.2 oz)  Body mass index is 29.15 kg/m².    SpO2: 95 %  O2 Device (Oxygen Therapy): room air      Intake/Output Summary (Last 24 hours)  at 7/5/2020 1652  Last data filed at 7/5/2020 1348  Gross per 24 hour   Intake 830 ml   Output --   Net 830 ml       Lines/Drains/Airways     Central Venous Catheter Line                 Hemodialysis Catheter 07/02/20 right internal jugular 3 days          Drain                 Hemodialysis AV Graft 07/04/20 0701 Left upper arm 1 day          Peripheral Intravenous Line                 Peripheral IV - Single Lumen 07/04/20 1537 20 G Right Forearm 1 day                Physical Exam   Constitutional: He is oriented to person, place, and time. He appears well-developed and well-nourished. No distress.   HENT:   Head: Normocephalic and atraumatic.   Eyes: Pupils are equal, round, and reactive to light. Right eye exhibits no discharge. Left eye exhibits no discharge.   Neck: Neck supple. No JVD present.   Cardiovascular: Normal rate, regular rhythm, S1 normal and S2 normal.   Murmur heard.  +mechanical click   Pulmonary/Chest: Effort normal and breath sounds normal. No respiratory distress. He has no wheezes. He has no rales.   Abdominal: Soft. He exhibits no distension.   Musculoskeletal:         General: No edema.   Neurological: He is alert and oriented to person, place, and time.   Skin: Skin is warm and dry. He is not diaphoretic. No erythema.   Left arm fistula wrapped/dressed    Arm warm to touch   Psychiatric: He has a normal mood and affect. His behavior is normal. Thought content normal.   Nursing note and vitals reviewed.      Significant Labs:   CMP   Recent Labs   Lab 07/04/20  0625 07/05/20  0612   * 138   K 4.3 4.7   CL 96 99   CO2 23 20*   GLU 96 88   BUN 62* 83*   CREATININE 12.9* 15.7*   CALCIUM 8.8 8.7   ANIONGAP 16 19*   ESTGFRAFRICA 5* 4*   EGFRNONAA 4* 3*   , CBC   Recent Labs   Lab 07/04/20  0625 07/04/20  1237 07/05/20  0612   WBC 7.07 8.20 7.47  7.47   HGB 7.7* 8.0* 6.9*  6.9*   HCT 24.5* 23.9* 20.9*  20.9*   * 119* 116*  116*   , Troponin No results for input(s): TROPONINI in  the last 48 hours. and All pertinent lab results from the last 24 hours have been reviewed.    Significant Imaging: Echocardiogram:   2D echo with color flow doppler:   Results for orders placed or performed during the hospital encounter of 04/28/19   2D echo with color flow doppler   Result Value Ref Range    QEF 60 55 - 65    Diastolic Dysfunction Yes (A)     Est. PA Systolic Pressure 57.15 (A)     Tricuspid Valve Regurgitation MILD     Narrative    Date of Procedure: 05/01/2019        TEST DESCRIPTION       Aorta: The aortic root is normal in size, measuring 3.6 cm at sinotubular junction and 3.8 cm at Sinuses of Valsalva. The proximal ascending aorta is normal in size, measuring 3.4 cm across.     Left Atrium: The left atrial volume index is mildly enlarged, measuring 36.75 cc/m2.     Left Ventricle: The left ventricle is normal in size, with an end-diastolic diameter of 4.1 cm, and an end-systolic diameter of 2.4 cm. LV wall thickness is normal, with the septum measuring 1.8 cm and the posterior wall measuring 1.7 cm across. Relative   wall thickness was increased at 0.83, and the LV mass index was increased at 173.5 g/m2 consistent with concentric left ventricular hypertrophy. There are no regional wall motion abnormalities. Left ventricular systolic function appears normal. Visually   estimated ejection fraction is 60-65%. The LV Doppler derived stroke volume equals 129.0 ccs.     Diastolic indices: E wave velocity 1.4 m/s, E/A ratio 1.3,  msec., E/e' ratio(avg) 20. There is pseudonormalization of mitral inflow pattern consistent with significant diastolic dysfunction.     Right Atrium: The right atrium is normal in size, measuring 5.8 cm in length and 4.0 cm in width in the apical view.     Right Ventricle: The right ventricle is normal in size. Global right ventricular systolic function appears normal. Tricuspid annular plane systolic excursion (TAPSE) is 1.7 cm. The estimated PA systolic pressure is  greater than 57 mmHg.     Aortic Valve:  There is a mechanical prosthesis present in the aortic position. The peak velocity obtained across the aortic valve is 3.89 m/s, which translates to a peak gradient of 61 mmHg. The mean gradient is 31 mmHg. Using a left ventricular outflow   tract diameter of 2.2 cm, a left ventricular outflow tract velocity time integral of 34 cm, and a peak instantaneous transvalvular velocity time integral of 64 cm, the effective prosthetic valve area is 2.01 cm2(p AVAi is 0.94 cm2/m2).     Mitral Valve:  The pressure half time is 38 msec. The calculated mitral valve area is 5.79 cm2. Mitral valve is normal in structure with normal leaflet mobility.     Tricuspid Valve:  There is mild tricuspid regurgitation. Tricuspid valve is normal in structure with normal leaflet mobility.     Pulmonary Valve:  Pulmonary valve is normal in structure with normal leaflet mobility.     Intracavitary: There is no evidence of pericardial effusion, intracavity mass, thrombi, or vegetation.         CONCLUSIONS     1 - Mild left atrial enlargement.     2 - Concentric hypertrophy.     3 - No wall motion abnormalities.     4 - Normal left ventricular systolic function (EF 60-65%).     5 - Impaired LV relaxation, elevated LAP (grade 2 diastolic dysfunction).     6 - Normal right ventricular systolic function .     7 - Pulmonary hypertension. The estimated PA systolic pressure is greater than 57 mmHg.     8 - Aortic valve prosthesis, JHOANA = 2.01 cm2, AVAi = 0.94 cm2/m2, peak velocity = 3.89 m/s, mean gradient = 31 mmHg.     9 - Mild tricuspid regurgitation.             This document has been electronically    SIGNED BY: Shai Calderon MD On: 05/01/2019 15:34   , EKG: Reviewed and X-Ray: CXR: X-Ray Chest 1 View (CXR): No results found for this visit on 07/01/20. and X-Ray Chest PA and Lateral (CXR): No results found for this visit on 07/01/20.    Assessment and Plan:     Brief HPI: AVR infection s/p revision with  NSVT and anemia req blood    * Arteriovenous graft infection  -Mgmt as per primary team  -Vascular surgery on board, s/p AVG revision/dressing change    Anemia associated with chronic renal failure  Cont to monitor    Bleeding from dialysis shunt  Cont to monitor  Cannot stop coumadin due to mech AVR    NSVT (nonsustained ventricular tachycardia)  -Likely triggered by AV fistula infection  -Asymptomatic  -Continue home meds  -Labetalol 100 mg BID added --increased to 200 BID  - Replete K and Mg with HD    Atrial fibrillation  IN NSR, cont BB and Coumadin    ESRD (end stage renal disease) on dialysis  -Mgmt as per nephrology    S/P AVR (aortic valve replacement)  -History of mechanical AVR  -INR 3.2 --> 2.8  -Bridge with heparin gtt if INR < 2.0 due to AVG bleeding/oozing; pharmacy consulted   Restart coumadin    Essential hypertension  -Continue home meds  -Montior      Call with changes in CV status, planned discussed with primary team regarding tx and will need to follow up with coumadin clinic in 2-3 days from [place] for repeat INR. Will see in clinic as follow up.     VTE Risk Mitigation (From admission, onward)         Ordered     warfarin (COUMADIN) tablet 15 mg  Every Saturday 07/04/20 1929     warfarin tablet 7.5 mg  Every Tuesday 07/02/20 1050     warfarin tablet 7.5 mg  Every Mon, Wed, Fri, Sun      07/02/20 1050     warfarin (COUMADIN) tablet 15 mg  Every Thursday 07/02/20 1050     IP VTE HIGH RISK PATIENT  Once      07/01/20 1340     Place sequential compression device  Until discontinued      07/01/20 1340                Fuad Gudino Md, MD  Cardiology  [facility]

## 2020-07-05 NOTE — PROGRESS NOTES
[facility]  Cardiology  Progress Note    Patient Name: Isidro White Jr.  MRN: [id number]  Admission Date: 7/1/2020  Hospital Length of Stay: 2 days  Code Status: Full Code   Attending Physician: Kyle Wright MD   Primary Care Physician: Miguel Campo MD (Inactive)  Expected Discharge Date:   Principal Problem:Arteriovenous graft infection    Subjective:     Hospital Course:   Mr. White is a 50 year old male aptient whose current medical conditions include ASD (s/p closure), HFpEF, s/p AVR (mechanical) and TV annuloplasty), HTN, PHTN, SVT (s/p RFA 3/17), and ESRD on HD who presented to [facility] ED yesterday with a chief complaint of bleeding from his L arm shunt. Associated symptoms included dizziness and diaphoresis. Patient denied any associated iesha chest pain, fever, chills, palpitations, near syncope, or syncope. While in ED, patient continued to have active bleeding which required suture placement and compression dressing. Initial workup revealed H/H of 10.8/32.0 and patient was subsequently admitted for further evaluation and treatment. Overnight, patient reportedly had runs of NSVT and cardiology consulted to assist with management. Patient seen and examined today, F. Remains chest pain free. He reports compliance with his medications. On long term AC (Coumadin) due to history of mechanical AVR. Chart reviewed. H/H has remained stable. INR 3.2 yesterday. Echo 3/20 showed normal EF. Stress 3/20 negative for ischemia. Vascular surgery on board and planning on access removal and Vas-cath placement.        Reviewed telemetry strips---NSVT confirmed.    7/3/20  NSVT occurred more freq today. Increased Labetolol to 200mg BID, continue on Cardizem 360. No CP, occ palpitations. Getting HD today.     7/4/20  Had more oozing from L arm AVG site with drop in H&H - 7.7, INR 2.1; discussed starting heparin drip and coumadin but on hold due to bleeding, will continue to monitor but discussed if  INR < 2.0 will need to be on heparin bridge while INR becomes therapeutic. No CP, NSVT improved with increase BB.     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     HD M, W, F    ESRD on dialysis     Hepatitis B     Hyperlipidemia     Hypertension     DANIEL (obstructive sleep apnea) 11/12/2019    Secondary hyperparathyroidism of renal origin     Supraventricular tachycardia     atrial tachycardia    Tachycardia     Valvular regurgitation     AI, TR       Past Surgical History:   Procedure Laterality Date    ASD REPAIR      age 8 [facility]    AV Graft Creation Left 03/2017    CARD[facility]AC [facility]HETERIZATION      [facility]     CARDIAC VALVE SURGERY  02/08/2017    22 mm Medtronic AV, 28 mm TV Medtronic annuloplaty ring    COLONOSCOPY N/A 8/27/2019    Procedure: COLONOSCOPY;  Surgeon: Addie John MD;  [facility] ENDO;  Service: Endoscopy;  Laterality: N/A;  dialysis pt *needs K*    ESOPHAGOGASTRODUODENOSCOPY N/A 8/27/2019    Procedure: EGD (ESOPHAGOGASTRODUODENOSCOPY);  Surgeon: Addie John MD;  Location: [facility];  Service: Endoscopy;  Laterality: N/A;    RADIOFREQUENCY ABLATION  03/13/2017    Atrial tachycardia       Review of patient's allergies indicates:  No Known Allergies    Current Facility-Administered Medications on File Prior to Encounter   Medication    0.9%  NaCl infusion    sodium chloride 0.9% flush 10 mL     Current Outpatient Medications on File Prior to Encounter   Medication Sig    labetalol (NORMODYNE) 100 MG tablet TAKE ONE TABLET BY MOUTH THREE TIMES DAILY AS NEEDED FOR HEART RATE > 120    albuterol (VENTOLIN HFA) 90 mcg/actuation inhaler Inhale 2 puffs into the lungs every 6 (six) hours as needed for Wheezing or Shortness of Breath. Rescue    amLODIPine  (NORVASC) 10 MG tablet TAKE ONE TABLET BY MOUTH EVERY DAY    amoxicillin (AMOXIL) 500 MG capsule Take 1 capsule (500 mg total) by mouth 3 (three) times daily x 14 days    ascorbic acid, vitamin C, (VITAMIN C) 500 MG tablet Take 1 tablet (500 mg total) by mouth 2 (two) times daily.    b complex vitamins tablet Take 1 tablet by mouth once daily.    calcium acetate (PHOSLO) 667 mg capsule Take 1 capsule (667 mg total) by mouth 3 (three) times daily.    diltiaZEM (CARDIZEM CD) 360 MG 24 hr capsule Take 1 capsule (360 mg total) by mouth once daily.    enoxaparin (LOVENOX) 100 mg/mL Syrg Inject 0.9 mLs (90 mg total) into the skin once daily.    epoetin vikki (PROCRIT) 10,000 unit/mL injection Inject 1 mL (10,000 Units total) into the skin every Mon, Wed, Fri. To be given with HD    fish oil-omega-3 fatty acids 300-1,000 mg capsule Take 2 g by mouth once daily.    hydrALAZINE (APRESOLINE) 100 MG tablet TAKE ONE TABLET BY MOUTH THREE TIMES DAILY INCLUDING DIALYSIS DAYS    hydrOXYzine pamoate (VISTARIL) 25 MG Cap T1C PO QD PRN ITCHING    lisinopril (PRINIVIL,ZESTRIL) 40 MG tablet TAKE ONE TABLET BY MOUTH EVERY DAY    multivitamin with minerals tablet Take 1 tablet by mouth once daily.    omega-3 fatty acids-vitamin E (FISH OIL) 1,000 mg Cap Take 1 capsule by mouth once daily.    pantoprazole (PROTONIX) 40 MG tablet TAKE ONE TABLET BY MOUTH EVERY DAY    ETHAN-RACQUEL RX 1- mg-mg-mcg Tab TAKE ONE TABLET BY MOUTH EVERY DAY    tenofovir (VIREAD) 300 mg Tab Take 1 tablet (300 mg total) by mouth once a week.    vitamin D (VITAMIN D3) 1000 units Tab Take 1,000 Units by mouth 3 (three) times a week.    warfarin (COUMADIN) 7.5 MG tablet Take 2 tablets by mouth on Thursdays and 1 tablet on all other days of the week. (Patient taking differently: Take 2 tablets by mouth on Thursdays and Saturday; and 1 tablet on all other days of the week.)     Family History     Problem Relation (Age of Onset)    Anesthesia problems  Paternal Uncle    Diabetes Paternal Aunt, Paternal Aunt    Heart attack Father    Heart failure Paternal Grandmother    Hypertension Paternal Aunt    Leukemia Mother, Paternal Aunt    No Known Problems Sister, Brother, Sister, Sister    Stroke Paternal Aunt    Suicide Paternal Uncle    Valvular heart disease Maternal Aunt        Tobacco Use    Smoking status: Never Smoker    Smokeless tobacco: Never Used   Substance and Sexual Activity    Alcohol use: No     Frequency: Never     Comment: quit in 2016; does have heavy drinking history; started drinking at age 13; was drinking a 12 pack over the weekend and two 24 ounces of beer a day during the week along with a pint of hard alcohol    Drug use: No    Sexual activity: Not on file     Review of Systems   Constitution: Positive for malaise/fatigue.   HENT: Negative.    Eyes: Negative.    Cardiovascular: Negative.    Respiratory: Negative.    Endocrine: Negative.    Hematologic/Lymphatic: Bruises/bleeds easily.   Skin: Negative.    Musculoskeletal: Positive for joint swelling (left arm).   Gastrointestinal: Negative.    Genitourinary: Negative.    Neurological: Negative.    Psychiatric/Behavioral: Negative.    Allergic/Immunologic: Negative.      Objective:     Vital Signs (Most Recent):  Temp: 98.4 °F (36.9 °C) (07/04/20 1949)  Pulse: 80 (07/04/20 1949)  Resp: 18 (07/04/20 1949)  BP: 133/72 (07/04/20 1949)  SpO2: 98 % (07/04/20 1949) Vital Signs (24h Range):  Temp:  [97.8 °F (36.6 °C)-98.7 °F (37.1 °C)] 98.4 °F (36.9 °C)  Pulse:  [79-99] 80  Resp:  [18-20] 18  SpO2:  [93 %-98 %] 98 %  BP: (112-137)/(58-79) 133/72     Weight: 103 kg (227 lb 1.2 oz)  Body mass index is 29.15 kg/m².    SpO2: 98 %  O2 Device (Oxygen Therapy): room air      Intake/Output Summary (Last 24 hours) at 7/4/2020 2045  Last data filed at 7/4/2020 1600  Gross per 24 hour   Intake 870 ml   Output 300 ml   Net 570 ml       Lines/Drains/Airways     Central Venous Catheter Line                  Hemodialysis Catheter 07/02/20 right internal jugular 2 days          Drain                 Hemodialysis AV Graft 07/04/20 0701 Left upper arm less than 1 day          Peripheral Intravenous Line                 Peripheral IV - Single Lumen 07/04/20 1537 20 G Right Forearm less than 1 day                Physical Exam   Constitutional: He is oriented to person, place, and time. He appears well-developed and well-nourished. No distress.   HENT:   Head: Normocephalic and atraumatic.   Eyes: Pupils are equal, round, and reactive to light. Right eye exhibits no discharge. Left eye exhibits no discharge.   Neck: Neck supple. No JVD present.   Cardiovascular: Normal rate, regular rhythm, S1 normal and S2 normal.   Murmur heard.  +mechanical click   Pulmonary/Chest: Effort normal and breath sounds normal. No respiratory distress. He has no wheezes. He has no rales.   Abdominal: Soft. He exhibits no distension.   Musculoskeletal:         General: No edema.   Neurological: He is alert and oriented to person, place, and time.   Skin: Skin is warm and dry. He is not diaphoretic. No erythema.   Left arm fistula wrapped/dressed    Arm warm to touch   Psychiatric: He has a normal mood and affect. His behavior is normal. Thought content normal.   Nursing note and vitals reviewed.      Significant Labs:   CMP   Recent Labs   Lab 07/03/20  0940 07/04/20  0625   * 135*   K 5.7* 4.3   CL 97 96   CO2 21* 23   GLU 96 96   BUN 74* 62*   CREATININE 14.8* 12.9*   CALCIUM 8.8 8.8   ANIONGAP 16 16   ESTGFRAFRICA 4* 5*   EGFRNONAA 3* 4*   , CBC   Recent Labs   Lab 07/03/20  0432 07/04/20  0625 07/04/20  1237   WBC 9.95 7.07 8.20   HGB 9.7* 7.7* 8.0*   HCT 30.0* 24.5* 23.9*    121* 119*   , Troponin No results for input(s): TROPONINI in the last 48 hours. and All pertinent lab results from the last 24 hours have been reviewed.    Significant Imaging: Echocardiogram:   2D echo with color flow doppler:   Results for orders placed or  performed during the hospital encounter of 04/28/19   2D echo with color flow doppler   Result Value Ref Range    QEF 60 55 - 65    Diastolic Dysfunction Yes (A)     Est. PA Systolic Pressure 57.15 (A)     Tricuspid Valve Regurgitation MILD     Narrative    Date of Procedure: 05/01/2019        TEST DESCRIPTION       Aorta: The aortic root is normal in size, measuring 3.6 cm at sinotubular junction and 3.8 cm at Sinuses of Valsalva. The proximal ascending aorta is normal in size, measuring 3.4 cm across.     Left Atrium: The left atrial volume index is mildly enlarged, measuring 36.75 cc/m2.     Left Ventricle: The left ventricle is normal in size, with an end-diastolic diameter of 4.1 cm, and an end-systolic diameter of 2.4 cm. LV wall thickness is normal, with the septum measuring 1.8 cm and the posterior wall measuring 1.7 cm across. Relative   wall thickness was increased at 0.83, and the LV mass index was increased at 173.5 g/m2 consistent with concentric left ventricular hypertrophy. There are no regional wall motion abnormalities. Left ventricular systolic function appears normal. Visually   estimated ejection fraction is 60-65%. The LV Doppler derived stroke volume equals 129.0 ccs.     Diastolic indices: E wave velocity 1.4 m/s, E/A ratio 1.3,  msec., E/e' ratio(avg) 20. There is pseudonormalization of mitral inflow pattern consistent with significant diastolic dysfunction.     Right Atrium: The right atrium is normal in size, measuring 5.8 cm in length and 4.0 cm in width in the apical view.     Right Ventricle: The right ventricle is normal in size. Global right ventricular systolic function appears normal. Tricuspid annular plane systolic excursion (TAPSE) is 1.7 cm. The estimated PA systolic pressure is greater than 57 mmHg.     Aortic Valve:  There is a mechanical prosthesis present in the aortic position. The peak velocity obtained across the aortic valve is 3.89 m/s, which translates to a peak  gradient of 61 mmHg. The mean gradient is 31 mmHg. Using a left ventricular outflow   tract diameter of 2.2 cm, a left ventricular outflow tract velocity time integral of 34 cm, and a peak instantaneous transvalvular velocity time integral of 64 cm, the effective prosthetic valve area is 2.01 cm2(p AVAi is 0.94 cm2/m2).     Mitral Valve:  The pressure half time is 38 msec. The calculated mitral valve area is 5.79 cm2. Mitral valve is normal in structure with normal leaflet mobility.     Tricuspid Valve:  There is mild tricuspid regurgitation. Tricuspid valve is normal in structure with normal leaflet mobility.     Pulmonary Valve:  Pulmonary valve is normal in structure with normal leaflet mobility.     Intracavitary: There is no evidence of pericardial effusion, intracavity mass, thrombi, or vegetation.         CONCLUSIONS     1 - Mild left atrial enlargement.     2 - Concentric hypertrophy.     3 - No wall motion abnormalities.     4 - Normal left ventricular systolic function (EF 60-65%).     5 - Impaired LV relaxation, elevated LAP (grade 2 diastolic dysfunction).     6 - Normal right ventricular systolic function .     7 - Pulmonary hypertension. The estimated PA systolic pressure is greater than 57 mmHg.     8 - Aortic valve prosthesis, JHOANA = 2.01 cm2, AVAi = 0.94 cm2/m2, peak velocity = 3.89 m/s, mean gradient = 31 mmHg.     9 - Mild tricuspid regurgitation.             This document has been electronically    SIGNED BY: Shai Calderon MD On: 05/01/2019 15:34   , EKG: Reviewed and X-Ray: CXR: X-Ray Chest 1 View (CXR): No results found for this visit on 07/01/20. and X-Ray Chest PA and Lateral (CXR): No results found for this visit on 07/01/20.    Assessment and Plan:     Brief HPI: AVG infection with NSVT, anemia    * Arteriovenous graft infection  -Mgmt as per primary team  -Vascular surgery on board, plans for AV graft removal    Anemia associated with chronic renal failure  Cont to monitor    NSVT  (nonsustained ventricular tachycardia)  -Likely triggered by AV fistula infection  -Asymptomatic  -Continue home meds  -Labetalol 100 mg BID added --increased to 200 BID  Replete K and Mg with HD    Atrial fibrillation  IN NSR, cont BB and Coumadin    ESRD (end stage renal disease) on dialysis  -Mgmt as per nephrology    S/P AVR (aortic valve replacement)  -History of mechanical AVR  -INR 3.2 --> 2.8  -Bridge with heparin gtt if INR < 2.0 due to AVG bleeding/oozing; pharmacy consulted     Essential hypertension  -Continue home meds  -Montior        VTE Risk Mitigation (From admission, onward)         Ordered     warfarin (COUMADIN) tablet 15 mg  Every Saturday 07/04/20 1929     warfarin tablet 7.5 mg  Every Tuesday 07/02/20 1050     warfarin tablet 7.5 mg  Every Mon, Wed, Fri, Sun      07/02/20 1050     warfarin (COUMADIN) tablet 15 mg  Every Thursday 07/02/20 1050     IP VTE HIGH RISK PATIENT  Once      07/01/20 1340     Place sequential compression device  Until discontinued      07/01/20 1340                Fuad Gudino Md, MD  Cardiology  [facility]

## 2020-07-05 NOTE — PROGRESS NOTES
[facility] Hospital Medicine  Progress Note    Patient Name: Isidro White Jr.  MRN: [id number]  Patient Class: IP- Inpatient   Admission Date: 7/1/2020  Length of Stay: 3 days  Attending Physician: Kyle Wright MD  Primary Care Provider: Miguel Campo MD (Inactive)        Subjective:     Principal Problem:Arteriovenous graft infection        HPI:  Isidro White Jr. is a 50 y.o. male patient with a PMHx of Aortic valve stenosis, A-fib, CHF, ESRD, HTN,  who presents to the Emergency Department for evaluation of shunt problem which onset suddenly this Am. Pt states that he just began bleeding for no reason from his L arm shunt. Associated sxs include diaphoresis and dizzy. Patient denies any fever, chills, sore throat, cough, n/v/d, CP, SOB, HA, syncope, weakness, and all other sxs at this time. Pt states that he is on Coumadin at this time and is a MWF dialysis pt. In the ED, pt acutely bleeding and was given 2 units of emergent blood. ED physician placed several sutures and applied compression dressing. Bleed was then controlled. Dr Gleason (vascular surgery) was consulted in the ED, plans to remove access and place vascath tomorrow in OR. Labs unremarkable. Patient placed in observation for infected graft and further monitoring.       Overview/Hospital Course:  Pt admitted to Medical Surgical Unit for Arteriovenous graft infection with left graft excision per Vascular Surgery.  IV antibiotics continued.  Heparin gtt noted.  Coumadin continued with pharmacy to dose.  Increased bleeding noted with anticoagulation adjusted.  H/H stable with downward trend noted.  Dressing to LUE reinforced.  Cariology and Nephrology following.  If bleeding stable will plan for discharge post HD.  On 7/5/2020, pt H/H declined with PRBCs x 1 unit given.  Pt remained stable and no bleeding from AVG site appreciated.  INR of 2 noted with Coumadin resumed and case discussed with Cardiology.  IV Vancomycin to  be continued with outpatient HD per Nephrology.  Discharge to home pending.     Interval History: pt stable.  H/H declined and PRBCs x 1 unit given.  No bleeding from AVG site appreciated. Pt to have IV antibiotics continued with outpatient HD.  INR 2 with Coumadin resumed.  Cardiology, Nephrology, and Vascular Surgery following.     Review of Systems   Constitutional: Positive for fatigue. Negative for chills and fever.   HENT: Negative.  Negative for congestion, dental problem, rhinorrhea and sore throat.    Eyes: Negative.  Negative for visual disturbance.   Respiratory: Negative.  Negative for cough, shortness of breath and wheezing.    Cardiovascular: Negative.  Negative for chest pain, palpitations and leg swelling.   Gastrointestinal: Negative for abdominal pain, diarrhea, nausea and vomiting.   Endocrine: Negative.  Negative for cold intolerance and heat intolerance.   Genitourinary: Negative.  Negative for dysuria and hematuria.   Musculoskeletal: Positive for arthralgias. Negative for gait problem and myalgias.   Skin: Negative.  Negative for rash and wound.   Allergic/Immunologic: Negative.    Neurological: Negative.  Negative for dizziness, syncope, weakness, numbness and headaches.   Hematological: Negative.  Negative for adenopathy.   Psychiatric/Behavioral: Negative.  Negative for confusion and dysphoric mood.   All other systems reviewed and are negative.    Objective:     Vital Signs (Most Recent):  Temp: 97.8 °F (36.6 °C) (07/05/20 1634)  Pulse: 86 (07/05/20 1634)  Resp: 18 (07/05/20 1634)  BP: 136/68 (07/05/20 1634)  SpO2: 95 % (07/05/20 1634) Vital Signs (24h Range):  Temp:  [97.8 °F (36.6 °C)-98.4 °F (36.9 °C)] 97.8 °F (36.6 °C)  Pulse:  [80-95] 86  Resp:  [14-18] 18  SpO2:  [93 %-98 %] 95 %  BP: (119-172)/(58-74) 136/68     Weight: 103 kg (227 lb 1.2 oz)  Body mass index is 29.15 kg/m².    Intake/Output Summary (Last 24 hours) at 7/5/2020 1606  Last data filed at 7/5/2020 1348  Gross per 24  hour   Intake 830 ml   Output --   Net 830 ml      Physical Exam  Constitutional:       General: He is not in acute distress.     Appearance: He is well-developed. He is not diaphoretic.   HENT:      Head: Normocephalic and atraumatic.   Eyes:      Conjunctiva/sclera: Conjunctivae normal.      Pupils: Pupils are equal, round, and reactive to light.   Neck:      Thyroid: No thyromegaly.      Vascular: No JVD.   Cardiovascular:      Rate and Rhythm: Normal rate and regular rhythm.      Heart sounds: Normal heart sounds. No murmur. No friction rub. No gallop.       Comments: Right IJV vascular catheter in place and functional.  Pulmonary:      Effort: Pulmonary effort is normal. No respiratory distress.      Breath sounds: Normal breath sounds. No wheezing or rales.   Abdominal:      General: Bowel sounds are normal. There is no distension.      Palpations: Abdomen is soft.      Tenderness: There is no abdominal tenderness. There is no guarding or rebound.   Musculoskeletal: Normal range of motion.         General: No tenderness.      Comments: Left arm dressed and ace wrapped.  Distal radial pulse 1+.  Normal sensation and ROM left hand.   Lymphadenopathy:      Cervical: No cervical adenopathy.   Skin:     General: Skin is warm and dry.      Capillary Refill: Capillary refill takes less than 2 seconds.      Findings: No erythema or rash.   Neurological:      Mental Status: He is alert and oriented to person, place, and time.      Cranial Nerves: No cranial nerve deficit.      Deep Tendon Reflexes: Reflexes are normal and symmetric. Reflexes normal.   Psychiatric:         Behavior: Behavior normal.         Thought Content: Thought content normal.         Judgment: Judgment normal.         Significant Labs:   CBC:   Recent Labs   Lab 07/04/20  0625 07/04/20  1237 07/05/20  0612   WBC 7.07 8.20 7.47  7.47   HGB 7.7* 8.0* 6.9*  6.9*   HCT 24.5* 23.9* 20.9*  20.9*   * 119* 116*  116*     CMP:   Recent Labs   Lab  07/04/20  0625 07/05/20  0612   * 138   K 4.3 4.7   CL 96 99   CO2 23 20*   GLU 96 88   BUN 62* 83*   CREATININE 12.9* 15.7*   CALCIUM 8.8 8.7   ANIONGAP 16 19*   EGFRNONAA 4* 3*       Significant Imaging:   Imaging Results    None         Assessment/Plan:      * Arteriovenous graft infection  Initially had bleeding at a graft site.  Bleeding has stopped, H/H stable.    Dr Gleason- Vascular Surgery consulted  -.  Fistula removal and Vascular Catheter placement 02 July with resumption of hemodialysis.  Vascular surgery to re-evaluate the wound Saturday 04 July then may return home and follow up outpatient.    -Blood cultures drawn and specimen culture of graft pending.    -Continuing Vancomycin and Cefepime.  -07/4/2020- bleeding at site with Coumadin on hold and Heparin gtt adjusted   -7/5/2020- no bleeding appreciated to site- Coumadin resumed     Anemia associated with chronic renal failure  H/H stable with downward trend noted in the setting of active bleeding from site  -PRBCs x 1 unit given   -repeat CBC in am   -will transfuse as needed- HD planned for tomorrow       Disorder of electrolytes  -Nephrology following   -HD planned for tomorrow   -repeat CMP in am       Bleeding from dialysis shunt  Vascular Surgery following   -no active bleeding appreciated   -Coumadin resumed- INR 2   -H/H stable with downward trend- PRBCs x 1 unit       NSVT (nonsustained ventricular tachycardia)  -Cardiology following   -electrolytes stable   -resolved       Atrial fibrillation  Coumadin resumed   Heart rate controlled        ESRD (end stage renal disease) on dialysis  HD on M/W/F   Nephrology consulted for HD management       S/P AVR (aortic valve replacement)  -Heparin gtt stopped   -Coumadin continued   -pharmacy to dose   -INR 2  -bleeding at AVG site controlled       Essential hypertension  Continue home meds   Monitor         VTE Risk Mitigation (From admission, onward)         Ordered     warfarin (COUMADIN) tablet  15 mg  Every Saturday 07/04/20 1929     warfarin tablet 7.5 mg  Every Tuesday 07/02/20 1050     warfarin tablet 7.5 mg  Every Mon, Wed, Fri, Sun      07/02/20 1050     warfarin (COUMADIN) tablet 15 mg  Every Thursday 07/02/20 1050     IP VTE HIGH RISK PATIENT  Once      07/01/20 1340     Place sequential compression device  Until discontinued      07/01/20 1340                      Gretchen Grace NP  Department of Hospital Medicine   [facility]

## 2020-07-05 NOTE — SUBJECTIVE & OBJECTIVE
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     HD M, W, F    ESRD on dialysis     Hepatitis B     Hyperlipidemia     Hypertension     DANIEL (obstructive sleep apnea) 11/12/2019    Secondary hyperparathyroidism of renal origin     Supraventricular tachycardia     atrial tachycardia    Tachycardia     Valvular regurgitation     AI, TR       Past Surgical History:   Procedure Laterality Date    ASD REPAIR      age 8 [facility]    AV Graft Creation Left 03/2017    CARD[facility]AC [facility]HETERIZATION      [facility]     CARDIAC VALVE SURGERY  02/08/2017    22 mm Medtronic AV, 28 mm TV Medtronic annuloplaty ring    COLONOSCOPY N/A 8/27/2019    Procedure: COLONOSCOPY;  Surgeon: Addie John MD;  Location: [facility] ENDO;  Service: Endoscopy;  Laterality: N/A;  dialysis pt *needs K*    ESOPHAGOGASTRODUODENOSCOPY N/A 8/27/2019    Procedure: EGD (ESOPHAGOGASTRODUODENOSCOPY);  Surgeon: Addie John MD;  Location: [facility] ENDO;  Service: Endoscopy;  Laterality: N/A;    RADIOFREQUENCY ABLATION  03/13/2017    Atrial tachycardia       Review of patient's allergies indicates:  No Known Allergies    Current Facility-Administered Medications on File Prior to Encounter   Medication    0.9%  NaCl infusion    sodium chloride 0.9% flush 10 mL     Current Outpatient Medications on File Prior to Encounter   Medication Sig    labetalol (NORMODYNE) 100 MG tablet TAKE ONE TABLET BY MOUTH THREE TIMES DAILY AS NEEDED FOR HEART RATE > 120    albuterol (VENTOLIN HFA) 90 mcg/actuation inhaler Inhale 2 puffs into the lungs every 6 (six) hours as needed for Wheezing or Shortness of Breath. Rescue    amLODIPine (NORVASC) 10 MG tablet TAKE ONE TABLET BY MOUTH EVERY DAY    amoxicillin (AMOXIL) 500 MG capsule Take 1 capsule (500  mg total) by mouth 3 (three) times daily x 14 days    ascorbic acid, vitamin C, (VITAMIN C) 500 MG tablet Take 1 tablet (500 mg total) by mouth 2 (two) times daily.    b complex vitamins tablet Take 1 tablet by mouth once daily.    calcium acetate (PHOSLO) 667 mg capsule Take 1 capsule (667 mg total) by mouth 3 (three) times daily.    diltiaZEM (CARDIZEM CD) 360 MG 24 hr capsule Take 1 capsule (360 mg total) by mouth once daily.    enoxaparin (LOVENOX) 100 mg/mL Syrg Inject 0.9 mLs (90 mg total) into the skin once daily.    epoetin vikki (PROCRIT) 10,000 unit/mL injection Inject 1 mL (10,000 Units total) into the skin every Mon, Wed, Fri. To be given with HD    fish oil-omega-3 fatty acids 300-1,000 mg capsule Take 2 g by mouth once daily.    hydrALAZINE (APRESOLINE) 100 MG tablet TAKE ONE TABLET BY MOUTH THREE TIMES DAILY INCLUDING DIALYSIS DAYS    hydrOXYzine pamoate (VISTARIL) 25 MG Cap T1C PO QD PRN ITCHING    lisinopril (PRINIVIL,ZESTRIL) 40 MG tablet TAKE ONE TABLET BY MOUTH EVERY DAY    multivitamin with minerals tablet Take 1 tablet by mouth once daily.    omega-3 fatty acids-vitamin E (FISH OIL) 1,000 mg Cap Take 1 capsule by mouth once daily.    pantoprazole (PROTONIX) 40 MG tablet TAKE ONE TABLET BY MOUTH EVERY DAY    ETHAN-RACQUEL RX 1- mg-mg-mcg Tab TAKE ONE TABLET BY MOUTH EVERY DAY    tenofovir (VIREAD) 300 mg Tab Take 1 tablet (300 mg total) by mouth once a week.    vitamin D (VITAMIN D3) 1000 units Tab Take 1,000 Units by mouth 3 (three) times a week.    warfarin (COUMADIN) 7.5 MG tablet Take 2 tablets by mouth on Thursdays and 1 tablet on all other days of the week. (Patient taking differently: Take 2 tablets by mouth on Thursdays and Saturday; and 1 tablet on all other days of the week.)     Family History     Problem Relation (Age of Onset)    Anesthesia problems Paternal Uncle    Diabetes Paternal Aunt, Paternal Aunt    Heart attack Father    Heart failure Paternal Grandmother     Hypertension Paternal Aunt    Leukemia Mother, Paternal Aunt    No Known Problems Sister, Brother, Sister, Sister    Stroke Paternal Aunt    Suicide Paternal Uncle    Valvular heart disease Maternal Aunt        Tobacco Use    Smoking status: Never Smoker    Smokeless tobacco: Never Used   Substance and Sexual Activity    Alcohol use: No     Frequency: Never     Comment: quit in 2016; does have heavy drinking history; started drinking at age 13; was drinking a 12 pack over the weekend and two 24 ounces of beer a day during the week along with a pint of hard alcohol    Drug use: No    Sexual activity: Not on file     Review of Systems   Constitution: Positive for malaise/fatigue.   HENT: Negative.    Eyes: Negative.    Cardiovascular: Negative.    Respiratory: Negative.    Endocrine: Negative.    Hematologic/Lymphatic: Bruises/bleeds easily.   Skin: Negative.    Musculoskeletal: Positive for joint swelling (left arm).   Gastrointestinal: Negative.    Genitourinary: Negative.    Neurological: Negative.    Psychiatric/Behavioral: Negative.    Allergic/Immunologic: Negative.      Objective:     Vital Signs (Most Recent):  Temp: 98.4 °F (36.9 °C) (07/04/20 1949)  Pulse: 80 (07/04/20 1949)  Resp: 18 (07/04/20 1949)  BP: 133/72 (07/04/20 1949)  SpO2: 98 % (07/04/20 1949) Vital Signs (24h Range):  Temp:  [97.8 °F (36.6 °C)-98.7 °F (37.1 °C)] 98.4 °F (36.9 °C)  Pulse:  [79-99] 80  Resp:  [18-20] 18  SpO2:  [93 %-98 %] 98 %  BP: (112-137)/(58-79) 133/72     Weight: 103 kg (227 lb 1.2 oz)  Body mass index is 29.15 kg/m².    SpO2: 98 %  O2 Device (Oxygen Therapy): room air      Intake/Output Summary (Last 24 hours) at 7/4/2020 2045  Last data filed at 7/4/2020 1600  Gross per 24 hour   Intake 870 ml   Output 300 ml   Net 570 ml       Lines/Drains/Airways     Central Venous Catheter Line                 Hemodialysis Catheter 07/02/20 right internal jugular 2 days          Drain                 Hemodialysis AV Graft  07/04/20 0701 Left upper arm less than 1 day          Peripheral Intravenous Line                 Peripheral IV - Single Lumen 07/04/20 1537 20 G Right Forearm less than 1 day                Physical Exam   Constitutional: He is oriented to person, place, and time. He appears well-developed and well-nourished. No distress.   HENT:   Head: Normocephalic and atraumatic.   Eyes: Pupils are equal, round, and reactive to light. Right eye exhibits no discharge. Left eye exhibits no discharge.   Neck: Neck supple. No JVD present.   Cardiovascular: Normal rate, regular rhythm, S1 normal and S2 normal.   Murmur heard.  +mechanical click   Pulmonary/Chest: Effort normal and breath sounds normal. No respiratory distress. He has no wheezes. He has no rales.   Abdominal: Soft. He exhibits no distension.   Musculoskeletal:         General: No edema.   Neurological: He is alert and oriented to person, place, and time.   Skin: Skin is warm and dry. He is not diaphoretic. No erythema.   Left arm fistula wrapped/dressed    Arm warm to touch   Psychiatric: He has a normal mood and affect. His behavior is normal. Thought content normal.   Nursing note and vitals reviewed.      Significant Labs:   CMP   Recent Labs   Lab 07/03/20  0940 07/04/20  0625   * 135*   K 5.7* 4.3   CL 97 96   CO2 21* 23   GLU 96 96   BUN 74* 62*   CREATININE 14.8* 12.9*   CALCIUM 8.8 8.8   ANIONGAP 16 16   ESTGFRAFRICA 4* 5*   EGFRNONAA 3* 4*   , CBC   Recent Labs   Lab 07/03/20  0432 07/04/20  0625 07/04/20  1237   WBC 9.95 7.07 8.20   HGB 9.7* 7.7* 8.0*   HCT 30.0* 24.5* 23.9*    121* 119*   , Troponin No results for input(s): TROPONINI in the last 48 hours. and All pertinent lab results from the last 24 hours have been reviewed.    Significant Imaging: Echocardiogram:   2D echo with color flow doppler:   Results for orders placed or performed during the hospital encounter of 04/28/19   2D echo with color flow doppler   Result Value Ref Range     QEF 60 55 - 65    Diastolic Dysfunction Yes (A)     Est. PA Systolic Pressure 57.15 (A)     Tricuspid Valve Regurgitation MILD     Narrative    Date of Procedure: 05/01/2019        TEST DESCRIPTION       Aorta: The aortic root is normal in size, measuring 3.6 cm at sinotubular junction and 3.8 cm at Sinuses of Valsalva. The proximal ascending aorta is normal in size, measuring 3.4 cm across.     Left Atrium: The left atrial volume index is mildly enlarged, measuring 36.75 cc/m2.     Left Ventricle: The left ventricle is normal in size, with an end-diastolic diameter of 4.1 cm, and an end-systolic diameter of 2.4 cm. LV wall thickness is normal, with the septum measuring 1.8 cm and the posterior wall measuring 1.7 cm across. Relative   wall thickness was increased at 0.83, and the LV mass index was increased at 173.5 g/m2 consistent with concentric left ventricular hypertrophy. There are no regional wall motion abnormalities. Left ventricular systolic function appears normal. Visually   estimated ejection fraction is 60-65%. The LV Doppler derived stroke volume equals 129.0 ccs.     Diastolic indices: E wave velocity 1.4 m/s, E/A ratio 1.3,  msec., E/e' ratio(avg) 20. There is pseudonormalization of mitral inflow pattern consistent with significant diastolic dysfunction.     Right Atrium: The right atrium is normal in size, measuring 5.8 cm in length and 4.0 cm in width in the apical view.     Right Ventricle: The right ventricle is normal in size. Global right ventricular systolic function appears normal. Tricuspid annular plane systolic excursion (TAPSE) is 1.7 cm. The estimated PA systolic pressure is greater than 57 mmHg.     Aortic Valve:  There is a mechanical prosthesis present in the aortic position. The peak velocity obtained across the aortic valve is 3.89 m/s, which translates to a peak gradient of 61 mmHg. The mean gradient is 31 mmHg. Using a left ventricular outflow   tract diameter of 2.2 cm, a  left ventricular outflow tract velocity time integral of 34 cm, and a peak instantaneous transvalvular velocity time integral of 64 cm, the effective prosthetic valve area is 2.01 cm2(p AVAi is 0.94 cm2/m2).     Mitral Valve:  The pressure half time is 38 msec. The calculated mitral valve area is 5.79 cm2. Mitral valve is normal in structure with normal leaflet mobility.     Tricuspid Valve:  There is mild tricuspid regurgitation. Tricuspid valve is normal in structure with normal leaflet mobility.     Pulmonary Valve:  Pulmonary valve is normal in structure with normal leaflet mobility.     Intracavitary: There is no evidence of pericardial effusion, intracavity mass, thrombi, or vegetation.         CONCLUSIONS     1 - Mild left atrial enlargement.     2 - Concentric hypertrophy.     3 - No wall motion abnormalities.     4 - Normal left ventricular systolic function (EF 60-65%).     5 - Impaired LV relaxation, elevated LAP (grade 2 diastolic dysfunction).     6 - Normal right ventricular systolic function .     7 - Pulmonary hypertension. The estimated PA systolic pressure is greater than 57 mmHg.     8 - Aortic valve prosthesis, JHOANA = 2.01 cm2, AVAi = 0.94 cm2/m2, peak velocity = 3.89 m/s, mean gradient = 31 mmHg.     9 - Mild tricuspid regurgitation.             This document has been electronically    SIGNED BY: Shai Calderon MD On: 05/01/2019 15:34   , EKG: Reviewed and X-Ray: CXR: X-Ray Chest 1 View (CXR): No results found for this visit on 07/01/20. and X-Ray Chest PA and Lateral (CXR): No results found for this visit on 07/01/20.

## 2020-07-05 NOTE — ASSESSMENT & PLAN NOTE
Patient on MWF schedule at [facility].     Next scheduled HD tomorrow.     Access: right IJ vascath. Infected left AVG was removed on 7/2/20.

## 2020-07-05 NOTE — SUBJECTIVE & OBJECTIVE
Interval History: pt stable.  H/H declined and PRBCs x 1 unit given.  No bleeding from AVG site appreciated. Pt to have IV antibiotics continued with outpatient HD.  INR 2 with Coumadin resumed.  Cardiology, Nephrology, and Vascular Surgery following.     Review of Systems   Constitutional: Positive for fatigue. Negative for chills and fever.   HENT: Negative.  Negative for congestion, dental problem, rhinorrhea and sore throat.    Eyes: Negative.  Negative for visual disturbance.   Respiratory: Negative.  Negative for cough, shortness of breath and wheezing.    Cardiovascular: Negative.  Negative for chest pain, palpitations and leg swelling.   Gastrointestinal: Negative for abdominal pain, diarrhea, nausea and vomiting.   Endocrine: Negative.  Negative for cold intolerance and heat intolerance.   Genitourinary: Negative.  Negative for dysuria and hematuria.   Musculoskeletal: Positive for arthralgias. Negative for gait problem and myalgias.   Skin: Negative.  Negative for rash and wound.   Allergic/Immunologic: Negative.    Neurological: Negative.  Negative for dizziness, syncope, weakness, numbness and headaches.   Hematological: Negative.  Negative for adenopathy.   Psychiatric/Behavioral: Negative.  Negative for confusion and dysphoric mood.   All other systems reviewed and are negative.    Objective:     Vital Signs (Most Recent):  Temp: 97.8 °F (36.6 °C) (07/05/20 1634)  Pulse: 86 (07/05/20 1634)  Resp: 18 (07/05/20 1634)  BP: 136/68 (07/05/20 1634)  SpO2: 95 % (07/05/20 1634) Vital Signs (24h Range):  Temp:  [97.8 °F (36.6 °C)-98.4 °F (36.9 °C)] 97.8 °F (36.6 °C)  Pulse:  [80-95] 86  Resp:  [14-18] 18  SpO2:  [93 %-98 %] 95 %  BP: (119-172)/(58-74) 136/68     Weight: 103 kg (227 lb 1.2 oz)  Body mass index is 29.15 kg/m².    Intake/Output Summary (Last 24 hours) at 7/5/2020 1824  Last data filed at 7/5/2020 1348  Gross per 24 hour   Intake 830 ml   Output --   Net 830 ml      Physical Exam  Constitutional:        General: He is not in acute distress.     Appearance: He is well-developed. He is not diaphoretic.   HENT:      Head: Normocephalic and atraumatic.   Eyes:      Conjunctiva/sclera: Conjunctivae normal.      Pupils: Pupils are equal, round, and reactive to light.   Neck:      Thyroid: No thyromegaly.      Vascular: No JVD.   Cardiovascular:      Rate and Rhythm: Normal rate and regular rhythm.      Heart sounds: Normal heart sounds. No murmur. No friction rub. No gallop.       Comments: Right IJV vascular catheter in place and functional.  Pulmonary:      Effort: Pulmonary effort is normal. No respiratory distress.      Breath sounds: Normal breath sounds. No wheezing or rales.   Abdominal:      General: Bowel sounds are normal. There is no distension.      Palpations: Abdomen is soft.      Tenderness: There is no abdominal tenderness. There is no guarding or rebound.   Musculoskeletal: Normal range of motion.         General: No tenderness.      Comments: Left arm dressed and ace wrapped.  Distal radial pulse 1+.  Normal sensation and ROM left hand.   Lymphadenopathy:      Cervical: No cervical adenopathy.   Skin:     General: Skin is warm and dry.      Capillary Refill: Capillary refill takes less than 2 seconds.      Findings: No erythema or rash.   Neurological:      Mental Status: He is alert and oriented to person, place, and time.      Cranial Nerves: No cranial nerve deficit.      Deep Tendon Reflexes: Reflexes are normal and symmetric. Reflexes normal.   Psychiatric:         Behavior: Behavior normal.         Thought Content: Thought content normal.         Judgment: Judgment normal.         Significant Labs:   CBC:   Recent Labs   Lab 07/04/20  0625 07/04/20  1237 07/05/20  0612   WBC 7.07 8.20 7.47  7.47   HGB 7.7* 8.0* 6.9*  6.9*   HCT 24.5* 23.9* 20.9*  20.9*   * 119* 116*  116*     CMP:   Recent Labs   Lab 07/04/20  0625 07/05/20  0612   * 138   K 4.3 4.7   CL 96 99   CO2 23 20*    GLU 96 88   BUN 62* 83*   CREATININE 12.9* 15.7*   CALCIUM 8.8 8.7   ANIONGAP 16 19*   EGFRNONAA 4* 3*       Significant Imaging:   Imaging Results    None

## 2020-07-05 NOTE — PROGRESS NOTES
[facility]    Asked to eval pt surgical site  POD#2 s/p complete excision of left arm access and placement of vascath  Dressings apparently had saturated over the course of the last 2 days and were changed by RN staff a few hours ago.  At time of exam, the dressings are clean and dry.  Hand is warm with palp radial pulse  Nl motor and sensation  1+ edema in the forearm which is to be expected    Imp/rec: cont daily dressings changes as needed: dry gauze/kerlix/ace  Ok from vascular standpoint for d/c  abx per renal/primary team    Will need new access placement, likely in the contralateral arm once he has cleared bacteremia and completed abx course  Pt may f/u in [facility] as outpt  Dr Sidhu

## 2020-07-05 NOTE — DISCHARGE SUMMARY
[facility] Hospital Medicine  Discharge Summary      Patient Name: Isidro White Jr.  MRN: [id number]  Admission Date: 7/1/2020  Hospital Length of Stay: 4 days  Discharge Date and Time: 7/6/2020  3:15 PM  Attending Physician: Dr. Kyle Wright    Discharging Provider: Gretchen Grace NP  Primary Care Provider: Miguel Campo MD (Inactive)      HPI:   Isirdo White Jr. is a 50 y.o. male patient with a PMHx of Aortic valve stenosis, A-fib, CHF, ESRD, HTN,  who presents to the Emergency Department for evaluation of shunt problem which onset suddenly this Am. Pt states that he just began bleeding for no reason from his L arm shunt. Associated sxs include diaphoresis and dizzy. Patient denies any fever, chills, sore throat, cough, n/v/d, CP, SOB, HA, syncope, weakness, and all other sxs at this time. Pt states that he is on Coumadin at this time and is a MWF dialysis pt. In the ED, pt acutely bleeding and was given 2 units of emergent blood. ED physician placed several sutures and applied compression dressing. Bleed was then controlled. Dr Gleason (vascular surgery) was consulted in the ED, plans to remove access and place vascath tomorrow in OR. Labs unremarkable. Patient placed in observation for infected graft and further monitoring.       Procedure(s) (LRB):  REMOVAL, GRAFT (Left)  INSERTION, CENTRAL VENOUS ACCESS DEVICE (Left)      Hospital Course:   Pt admitted to Medical Surgical Unit for Arteriovenous graft infection with left graft excision per Vascular Surgery.  IV antibiotics continued.  Heparin gtt noted.  Coumadin continued with pharmacy to dose.  Increased bleeding noted with anticoagulation adjusted.  H/H stable with downward trend noted.  Dressing to LUE reinforced.  Cariology and Nephrology following.  If bleeding stable will plan for discharge post HD.  On 7/5/2020, pt H/H declined with PRBCs x 1 unit given.  Pt remained stable and no bleeding from AVG site appreciated.  INR of 2  noted with Coumadin resumed and case discussed with Cardiology.  IV Vancomycin to be continued with outpatient HD per Nephrology.  On date of discharge, pt tolerated HD.  Coumadin continued with INR noted.  No evidence of bleeding to AV/surgical site noted.  H/H remained stable.  Pt seen and examined on the date of discharge and deemed suitable for discharge to home.  Pt to resume outpatient HD schedule.  Current medications resumed with Colace, Bacroban, Nozin, and Miralax prescribed.  IV Vancomycin to be continued outpatient with HD per Nephrology.  Pt instructed to follow up with PCP, Nephrology, Vacular Surgery, and Cardiology upon discharge for further evaluation. Discharge instructions, compliance with Coumadin, and follow up reinforced with patient again via phone call with understanding verbalized.    Consults:   Consults (From admission, onward)        Status Ordering Provider     Inpatient consult to Cardiology  Once     Provider:  (Not yet assigned)    ALICIA Olivares     Inpatient consult to Nephrology  Once     Provider:  Gus Hurst MD    Completed LUCAS CARRILLO     Inpatient consult to Vascular Surgery  Once     Provider:  David Gleason MD    Completed LUCAS CARRILOL          Final Active Diagnoses:    Diagnosis Date Noted POA    PRINCIPAL PROBLEM:  Arteriovenous graft infection [T82.7XXA] 07/01/2020 Yes    Disorder of electrolytes [E87.8] 07/03/2020 Yes    Anemia associated with chronic renal failure [N18.9, D63.1] 07/03/2020 Yes    NSVT (nonsustained ventricular tachycardia) [I47.2] 07/02/2020 Yes    Bleeding from dialysis shunt [T82.838A] 07/02/2020 Yes    Atrial fibrillation [I48.91] 03/27/2019 Yes    ESRD (end stage renal disease) on dialysis [N18.6, Z99.2]  Not Applicable     Chronic    S/P AVR (aortic valve replacement) [Z95.2] 02/09/2017 Not Applicable    Essential hypertension [I10]  Yes      Problems Resolved During this Admission:       Discharged  Condition: stable    Disposition: Home or Self Care    Follow Up:  Follow-up Information     Miguel Campo MD In 3 days.    Specialty: Orthopedic Surgery  Why: -hospital follow up   Contact information:  [address]  [place] [address] [phone]             Follow up In 1 week.    Why: -please keep appointment with established Nephrologist            KELI LANG In 3 days.    Specialty: Cardiology  Why: -hospital follow up to check INR            Fuad Gudino Md, MD In 1 week.    Specialties: Cardiology, Internal Medicine  Why: -hospital follow up   Contact information:  [address]  [place] [address] [phone]             Sascha Sidhu MD In 1 week.    Specialty: Vascular Surgery  Why: -hospital follow up in 1-2 weeks   Contact information:  [address]  [address]  [place] [address] [phone]                 Patient Instructions:      Diet renal     Notify your health care provider if you experience any of the following:  temperature >100.4     Notify your health care provider if you experience any of the following:  increased confusion or weakness     Notify your health care provider if you experience any of the following:  persistent dizziness, light-headedness, or visual disturbances     Notify your health care provider if you experience any of the following:  difficulty breathing or increased cough     Notify your health care provider if you experience any of the following:  severe persistent headache     Notify your health care provider if you experience any of the following:  persistent nausea and vomiting or diarrhea     Notify your health care provider if you experience any of the following:  redness, tenderness, or signs of infection (pain, swelling, redness, odor or green/yellow discharge around incision site)     Activity as tolerated     Activity as tolerated       Significant Diagnostic Studies: Labs: All labs within the past 24 hours  have been reviewed    Pending Diagnostic Studies:     None         Medications:  Reconciled Home Medications:      Medication List      START taking these medications    docusate sodium 100 MG capsule  Commonly known as: COLACE  Take 1 capsule (100 mg total) by mouth 2 (two) times daily.     mupirocin 2 % ointment  Commonly known as: BACTROBAN  by Nasal route 2 (two) times daily. for 7 days     nozaseptin  nasal   Commonly known as: NOZIN  1 each by Each Nostril route 2 (two) times daily. for 7 days     polyethylene glycol 17 gram Pwpk  Commonly known as: GLYCOLAX  Take 17 g by mouth once daily.        CHANGE how you take these medications    calcium acetate(phosphat bind) 667 mg capsule  Commonly known as: PHOSLO  Take 2 capsules (1,334 mg total) by mouth 3 (three) times daily.  What changed: how much to take     warfarin 7.5 MG tablet  Commonly known as: COUMADIN  Take 2 tablets by mouth on Thursdays and 1 tablet on all other days of the week.  What changed: additional instructions        CONTINUE taking these medications    albuterol 90 mcg/actuation inhaler  Commonly known as: VENTOLIN HFA  Inhale 2 puffs into the lungs every 6 (six) hours as needed for Wheezing or Shortness of Breath. Rescue     amLODIPine 10 MG tablet  Commonly known as: NORVASC  TAKE ONE TABLET BY MOUTH EVERY DAY     ascorbic acid (vitamin C) 500 MG tablet  Commonly known as: VITAMIN C  Take 1 tablet (500 mg total) by mouth 2 (two) times daily.     b complex vitamins tablet  Take 1 tablet by mouth once daily.     diltiaZEM 360 MG 24 hr capsule  Commonly known as: CARDIZEM CD  Take 1 capsule (360 mg total) by mouth once daily.     epoetin vikki 10,000 unit/mL injection  Commonly known as: PROCRIT  Inject 1 mL (10,000 Units total) into the skin every Mon, Wed, Fri. To be given with HD     fish oil-omega-3 fatty acids 300-1,000 mg capsule  Take 2 g by mouth once daily.     hydrALAZINE 100 MG tablet  Commonly known as: APRESOLINE  TAKE  ONE TABLET BY MOUTH THREE TIMES DAILY INCLUDING DIALYSIS DAYS     hydrOXYzine pamoate 25 MG Cap  Commonly known as: VISTARIL  T1C PO QD PRN ITCHING     labetaloL 100 MG tablet  Commonly known as: NORMODYNE  TAKE ONE TABLET BY MOUTH THREE TIMES DAILY AS NEEDED FOR HEART RATE > 120     lisinopriL 40 MG tablet  Commonly known as: PRINIVIL,ZESTRIL  TAKE ONE TABLET BY MOUTH EVERY DAY     multivitamin with minerals tablet  Take 1 tablet by mouth once daily.     omega-3 fatty acids-vitamin E 1,000 mg Cap  Commonly known as: FISH OIL  Take 1 capsule by mouth once daily.     pantoprazole 40 MG tablet  Commonly known as: PROTONIX  TAKE ONE TABLET BY MOUTH EVERY DAY     ETHAN-RACQUEL RX 1- mg-mg-mcg Tab  Generic drug: vit b cmplx 3-fa-vit c-biotin 1- mg-mg-mcg  TAKE ONE TABLET BY MOUTH EVERY DAY     tenofovir 300 mg Tab  Commonly known as: VIREAD  Take 1 tablet (300 mg total) by mouth once a week.     vitamin D 1000 units Tab  Commonly known as: VITAMIN D3  Take 1,000 Units by mouth 3 (three) times a week.        STOP taking these medications    amoxicillin 500 MG capsule  Commonly known as: AMOXIL     enoxaparin 100 mg/mL Syrg  Commonly known as: LOVENOX            Indwelling Lines/Drains at time of discharge:   Lines/Drains/Airways     Central Venous Catheter Line                 Hemodialysis Catheter 07/02/20 right internal jugular 3 days          Drain                 Hemodialysis AV Graft 07/04/20 0701 Left upper arm 1 day                Time spent on the discharge of patient: > 42 minutes  Patient was seen and examined on the date of discharge and determined to be suitable for discharge.         Gretchen Grace NP  Department of Hospital Medicine  [facility]

## 2020-07-05 NOTE — ASSESSMENT & PLAN NOTE
Vascular Surgery following   -no active bleeding appreciated   -Coumadin resumed- INR 2   -H/H stable with downward trend- PRBCs x 1 unit

## 2020-07-05 NOTE — SUBJECTIVE & OBJECTIVE
Interval History:     7/3/20: patient is currently on hemodialysis. He is tolerating the procedure well. Vitals are stable. Patient reports left arm pain.     7/4/20: patient is resting comfortably, no complaints at present.     7/5/20: patient still with left arm pain.           Review of patient's allergies indicates:  No Known Allergies  Current Facility-Administered Medications   Medication Frequency    0.9%  NaCl infusion (for blood administration) Q24H PRN    albuterol-ipratropium 2.5 mg-0.5 mg/3 mL nebulizer solution 3 mL Q6H PRN    amLODIPine tablet 10 mg Daily    calcium acetate(phosphat bind) capsule 1,334 mg TID    cefepime in dextrose 5 % IVPB 2 g Q24H    diltiaZEM 24 hr capsule 360 mg Daily    epoetin vikki-epbx injection 10,000 Units Once    hydrALAZINE tablet 100 mg Q8H    HYDROcodone-acetaminophen 5-325 mg per tablet 1 tablet Q4H PRN    HYDROmorphone injection 0.5 mg Q4H PRN    hydrOXYzine pamoate capsule 25 mg Q8H PRN    lisinopriL tablet 40 mg Daily    morphine injection 6 mg Q4H PRN    nozaseptin (NOZIN) nasal  BID    ondansetron injection 4 mg Q12H PRN    pantoprazole EC tablet 40 mg Daily    sodium chloride 0.9% flush 10 mL PRN    tenofovir tablet 300 mg Weekly    vancomycin - pharmacy to dose pharmacy to manage frequency    vit b cmplx 3-fa-vit c-biotin 1- mg-mg-mcg per tablet 1 tablet Daily    warfarin (COUMADIN) tablet 15 mg Every Thurs    [START ON 7/11/2020] warfarin (COUMADIN) tablet 15 mg Every Sat    warfarin tablet 7.5 mg Every Mon, Wed, Fri, Sun    [START ON 7/7/2020] warfarin tablet 7.5 mg Every Tues     Facility-Administered Medications Ordered in Other Encounters   Medication Frequency    0.9%  NaCl infusion Continuous    sodium chloride 0.9% flush 10 mL PRN       Objective:     Vital Signs (Most Recent):  Temp: 97.8 °F (36.6 °C) (07/05/20 0708)  Pulse: 87 (07/05/20 0708)  Resp: 16 (07/05/20 0939)  BP: 129/64 (07/05/20 0708)  SpO2: 96 %  (07/05/20 0708)  O2 Device (Oxygen Therapy): room air (07/05/20 0708) Vital Signs (24h Range):  Temp:  [97.8 °F (36.6 °C)-98.7 °F (37.1 °C)] 97.8 °F (36.6 °C)  Pulse:  [80-99] 87  Resp:  [14-20] 16  SpO2:  [93 %-98 %] 96 %  BP: (119-137)/(58-72) 129/64     Weight: 103 kg (227 lb 1.2 oz) (07/01/20 1436)  Body mass index is 29.15 kg/m².  Body surface area is 2.32 meters squared.    I/O last 3 completed shifts:  In: 1350 [P.O.:1200; IV Piggyback:150]  Out: 300 [Urine:300]    Physical Exam  Constitutional:       Appearance: He is well-developed.   HENT:      Head: Normocephalic.      Nose: No rhinorrhea.      Mouth/Throat:      Pharynx: No oropharyngeal exudate.   Eyes:      Pupils: Pupils are equal, round, and reactive to light.   Neck:      Thyroid: No thyroid mass.   Cardiovascular:      Rate and Rhythm: Normal rate and regular rhythm.      Heart sounds: S1 normal and S2 normal.   Pulmonary:      Effort: Pulmonary effort is normal. No respiratory distress.      Breath sounds: No wheezing.   Abdominal:      General: Bowel sounds are normal. There is no distension.      Palpations: Abdomen is soft.      Tenderness: There is no abdominal tenderness.      Hernia: No hernia is present.   Musculoskeletal:      Comments: Left arm dressed and wrapped.    Lymphadenopathy:      Cervical: No cervical adenopathy.   Skin:     General: Skin is warm and dry.   Neurological:      Mental Status: He is alert and oriented to person, place, and time.   Psychiatric:         Behavior: Behavior is cooperative.         Significant Labs:  Lab Results   Component Value Date    CREATININE 15.7 (H) 07/05/2020    BUN 83 (H) 07/05/2020     07/05/2020    K 4.7 07/05/2020    CL 99 07/05/2020    CO2 20 (L) 07/05/2020     Lab Results   Component Value Date    .0 (H) 01/08/2019    CALCIUM 8.7 07/05/2020    PHOS 7.9 (H) 07/05/2020     Lab Results   Component Value Date    ALBUMIN 3.4 (L) 07/01/2020     Lab Results   Component Value Date     WBC 7.47 07/05/2020    WBC 7.47 07/05/2020    HGB 6.9 (L) 07/05/2020    HGB 6.9 (L) 07/05/2020    HCT 20.9 (L) 07/05/2020    HCT 20.9 (L) 07/05/2020     (H) 07/05/2020     (H) 07/05/2020     (L) 07/05/2020     (L) 07/05/2020       No results for input(s): MG in the last 168 hours.      Significant Imaging:  Imaging Results    None

## 2020-07-05 NOTE — ASSESSMENT & PLAN NOTE
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

## 2020-07-05 NOTE — PROGRESS NOTES
Coumadin Progress Notes:    Indication:Hx of AVR/afib/Mechanical AVR   INR today = 2   Per conversation with Dr. Gudino will restart coumadin today, nursing noted dressing stable with no signs of active bleeding today.  Will resume heparin bridge when INR is <2 per Dr. Gudino.  Goal INR for now is 2-3 rather than 2.5-3.5 because of recent bleeding.   Dr. Gudino to keep pharmacy informed for any changes to INR goal.  /Brenda Dickens [facility] 7/5/2020 10:59 AM

## 2020-07-05 NOTE — PLAN OF CARE
Patient remains free from falls and injuries. Iv antibiotics provided. On unit of blood transfused. No adverse reactions noted. Pain managed with oral and IV pain medication. No signs of bleeding. Will continue to monitor.

## 2020-07-05 NOTE — SUBJECTIVE & OBJECTIVE
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     CARDIAC VALVE SURGERY  02/08/2017    22 mm Medtronic AV, 28 mm TV Medtronic annuloplaty ring    COLONOSCOPY N/A 8/27/2019    Procedure: COLONOSCOPY;  Surgeon: Addie John MD;  Location: [facility] ENDO;  Service: Endoscopy;  Laterality: N/A;  dialysis pt *needs K*    ESOPHAGOGASTRODUODENOSCOPY N/A 8/27/2019    Procedure: EGD (ESOPHAGOGASTRODUODENOSCOPY);  Surgeon: Addie John MD;  Location: [facility] ENDO;  Service: Endoscopy;  Laterality: N/A;    RADIOFREQUENCY ABLATION  03/13/2017    Atrial tachycardia       Review of patient's allergies indicates:  No Known Allergies    Current Facility-Administered Medications on File Prior to Encounter   Medication    0.9%  NaCl infusion    sodium chloride 0.9% flush 10 mL     Current Outpatient Medications on File Prior to Encounter   Medication Sig    labetalol (NORMODYNE) 100 MG tablet TAKE ONE TABLET BY MOUTH THREE TIMES DAILY AS NEEDED FOR HEART RATE > 120    albuterol (VENTOLIN HFA) 90 mcg/actuation inhaler Inhale 2 puffs into the lungs every 6 (six) hours as needed for Wheezing or Shortness of Breath. Rescue    amLODIPine (NORVASC) 10 MG tablet TAKE ONE TABLET BY MOUTH EVERY DAY    amoxicillin (AMOXIL) 500 MG capsule Take 1 capsule (500  mg total) by mouth 3 (three) times daily x 14 days    ascorbic acid, vitamin C, (VITAMIN C) 500 MG tablet Take 1 tablet (500 mg total) by mouth 2 (two) times daily.    b complex vitamins tablet Take 1 tablet by mouth once daily.    calcium acetate (PHOSLO) 667 mg capsule Take 1 capsule (667 mg total) by mouth 3 (three) times daily.    diltiaZEM (CARDIZEM CD) 360 MG 24 hr capsule Take 1 capsule (360 mg total) by mouth once daily.    enoxaparin (LOVENOX) 100 mg/mL Syrg Inject 0.9 mLs (90 mg total) into the skin once daily.    epoetin vikki (PROCRIT) 10,000 unit/mL injection Inject 1 mL (10,000 Units total) into the skin every Mon, Wed, Fri. To be given with HD    fish oil-omega-3 fatty acids 300-1,000 mg capsule Take 2 g by mouth once daily.    hydrALAZINE (APRESOLINE) 100 MG tablet TAKE ONE TABLET BY MOUTH THREE TIMES DAILY INCLUDING DIALYSIS DAYS    hydrOXYzine pamoate (VISTARIL) 25 MG Cap T1C PO QD PRN ITCHING    lisinopril (PRINIVIL,ZESTRIL) 40 MG tablet TAKE ONE TABLET BY MOUTH EVERY DAY    multivitamin with minerals tablet Take 1 tablet by mouth once daily.    omega-3 fatty acids-vitamin E (FISH OIL) 1,000 mg Cap Take 1 capsule by mouth once daily.    pantoprazole (PROTONIX) 40 MG tablet TAKE ONE TABLET BY MOUTH EVERY DAY    ETHAN-RACQUEL RX 1- mg-mg-mcg Tab TAKE ONE TABLET BY MOUTH EVERY DAY    tenofovir (VIREAD) 300 mg Tab Take 1 tablet (300 mg total) by mouth once a week.    vitamin D (VITAMIN D3) 1000 units Tab Take 1,000 Units by mouth 3 (three) times a week.    warfarin (COUMADIN) 7.5 MG tablet Take 2 tablets by mouth on Thursdays and 1 tablet on all other days of the week. (Patient taking differently: Take 2 tablets by mouth on Thursdays and Saturday; and 1 tablet on all other days of the week.)     Family History     Problem Relation (Age of Onset)    Anesthesia problems Paternal Uncle    Diabetes Paternal Aunt, Paternal Aunt    Heart attack Father    Heart failure Paternal Grandmother     Hypertension Paternal Aunt    Leukemia Mother, Paternal Aunt    No Known Problems Sister, Brother, Sister, Sister    Stroke Paternal Aunt    Suicide Paternal Uncle    Valvular heart disease Maternal Aunt        Tobacco Use    Smoking status: Never Smoker    Smokeless tobacco: Never Used   Substance and Sexual Activity    Alcohol use: No     Frequency: Never     Comment: quit in 2016; does have heavy drinking history; started drinking at age 13; was drinking a 12 pack over the weekend and two 24 ounces of beer a day during the week along with a pint of hard alcohol    Drug use: No    Sexual activity: Not on file     Review of Systems   Constitution: Positive for malaise/fatigue.   HENT: Negative.    Eyes: Negative.    Cardiovascular: Negative.    Respiratory: Negative.    Endocrine: Negative.    Hematologic/Lymphatic: Bruises/bleeds easily.   Skin: Negative.    Musculoskeletal: Positive for joint swelling (left arm).   Gastrointestinal: Negative.    Genitourinary: Negative.    Neurological: Negative.    Psychiatric/Behavioral: Negative.    Allergic/Immunologic: Negative.      Objective:     Vital Signs (Most Recent):  Temp: 97.8 °F (36.6 °C) (07/05/20 1634)  Pulse: 86 (07/05/20 1634)  Resp: 18 (07/05/20 1634)  BP: 136/68 (07/05/20 1634)  SpO2: 95 % (07/05/20 1634) Vital Signs (24h Range):  Temp:  [97.8 °F (36.6 °C)-98.4 °F (36.9 °C)] 97.8 °F (36.6 °C)  Pulse:  [80-95] 86  Resp:  [14-18] 18  SpO2:  [93 %-98 %] 95 %  BP: (119-172)/(58-74) 136/68     Weight: 103 kg (227 lb 1.2 oz)  Body mass index is 29.15 kg/m².    SpO2: 95 %  O2 Device (Oxygen Therapy): room air      Intake/Output Summary (Last 24 hours) at 7/5/2020 1652  Last data filed at 7/5/2020 1348  Gross per 24 hour   Intake 830 ml   Output --   Net 830 ml       Lines/Drains/Airways     Central Venous Catheter Line                 Hemodialysis Catheter 07/02/20 right internal jugular 3 days          Drain                 Hemodialysis AV Graft 07/04/20  0701 Left upper arm 1 day          Peripheral Intravenous Line                 Peripheral IV - Single Lumen 07/04/20 1537 20 G Right Forearm 1 day                Physical Exam   Constitutional: He is oriented to person, place, and time. He appears well-developed and well-nourished. No distress.   HENT:   Head: Normocephalic and atraumatic.   Eyes: Pupils are equal, round, and reactive to light. Right eye exhibits no discharge. Left eye exhibits no discharge.   Neck: Neck supple. No JVD present.   Cardiovascular: Normal rate, regular rhythm, S1 normal and S2 normal.   Murmur heard.  +mechanical click   Pulmonary/Chest: Effort normal and breath sounds normal. No respiratory distress. He has no wheezes. He has no rales.   Abdominal: Soft. He exhibits no distension.   Musculoskeletal:         General: No edema.   Neurological: He is alert and oriented to person, place, and time.   Skin: Skin is warm and dry. He is not diaphoretic. No erythema.   Left arm fistula wrapped/dressed    Arm warm to touch   Psychiatric: He has a normal mood and affect. His behavior is normal. Thought content normal.   Nursing note and vitals reviewed.      Significant Labs:   CMP   Recent Labs   Lab 07/04/20  0625 07/05/20  0612   * 138   K 4.3 4.7   CL 96 99   CO2 23 20*   GLU 96 88   BUN 62* 83*   CREATININE 12.9* 15.7*   CALCIUM 8.8 8.7   ANIONGAP 16 19*   ESTGFRAFRICA 5* 4*   EGFRNONAA 4* 3*   , CBC   Recent Labs   Lab 07/04/20  0625 07/04/20  1237 07/05/20  0612   WBC 7.07 8.20 7.47  7.47   HGB 7.7* 8.0* 6.9*  6.9*   HCT 24.5* 23.9* 20.9*  20.9*   * 119* 116*  116*   , Troponin No results for input(s): TROPONINI in the last 48 hours. and All pertinent lab results from the last 24 hours have been reviewed.    Significant Imaging: Echocardiogram:   2D echo with color flow doppler:   Results for orders placed or performed during the hospital encounter of 04/28/19   2D echo with color flow doppler   Result Value Ref Range     QEF 60 55 - 65    Diastolic Dysfunction Yes (A)     Est. PA Systolic Pressure 57.15 (A)     Tricuspid Valve Regurgitation MILD     Narrative    Date of Procedure: 05/01/2019        TEST DESCRIPTION       Aorta: The aortic root is normal in size, measuring 3.6 cm at sinotubular junction and 3.8 cm at Sinuses of Valsalva. The proximal ascending aorta is normal in size, measuring 3.4 cm across.     Left Atrium: The left atrial volume index is mildly enlarged, measuring 36.75 cc/m2.     Left Ventricle: The left ventricle is normal in size, with an end-diastolic diameter of 4.1 cm, and an end-systolic diameter of 2.4 cm. LV wall thickness is normal, with the septum measuring 1.8 cm and the posterior wall measuring 1.7 cm across. Relative   wall thickness was increased at 0.83, and the LV mass index was increased at 173.5 g/m2 consistent with concentric left ventricular hypertrophy. There are no regional wall motion abnormalities. Left ventricular systolic function appears normal. Visually   estimated ejection fraction is 60-65%. The LV Doppler derived stroke volume equals 129.0 ccs.     Diastolic indices: E wave velocity 1.4 m/s, E/A ratio 1.3,  msec., E/e' ratio(avg) 20. There is pseudonormalization of mitral inflow pattern consistent with significant diastolic dysfunction.     Right Atrium: The right atrium is normal in size, measuring 5.8 cm in length and 4.0 cm in width in the apical view.     Right Ventricle: The right ventricle is normal in size. Global right ventricular systolic function appears normal. Tricuspid annular plane systolic excursion (TAPSE) is 1.7 cm. The estimated PA systolic pressure is greater than 57 mmHg.     Aortic Valve:  There is a mechanical prosthesis present in the aortic position. The peak velocity obtained across the aortic valve is 3.89 m/s, which translates to a peak gradient of 61 mmHg. The mean gradient is 31 mmHg. Using a left ventricular outflow   tract diameter of 2.2 cm, a  left ventricular outflow tract velocity time integral of 34 cm, and a peak instantaneous transvalvular velocity time integral of 64 cm, the effective prosthetic valve area is 2.01 cm2(p AVAi is 0.94 cm2/m2).     Mitral Valve:  The pressure half time is 38 msec. The calculated mitral valve area is 5.79 cm2. Mitral valve is normal in structure with normal leaflet mobility.     Tricuspid Valve:  There is mild tricuspid regurgitation. Tricuspid valve is normal in structure with normal leaflet mobility.     Pulmonary Valve:  Pulmonary valve is normal in structure with normal leaflet mobility.     Intracavitary: There is no evidence of pericardial effusion, intracavity mass, thrombi, or vegetation.         CONCLUSIONS     1 - Mild left atrial enlargement.     2 - Concentric hypertrophy.     3 - No wall motion abnormalities.     4 - Normal left ventricular systolic function (EF 60-65%).     5 - Impaired LV relaxation, elevated LAP (grade 2 diastolic dysfunction).     6 - Normal right ventricular systolic function .     7 - Pulmonary hypertension. The estimated PA systolic pressure is greater than 57 mmHg.     8 - Aortic valve prosthesis, JHOANA = 2.01 cm2, AVAi = 0.94 cm2/m2, peak velocity = 3.89 m/s, mean gradient = 31 mmHg.     9 - Mild tricuspid regurgitation.             This document has been electronically    SIGNED BY: Shai Calderon MD On: 05/01/2019 15:34   , EKG: Reviewed and X-Ray: CXR: X-Ray Chest 1 View (CXR): No results found for this visit on 07/01/20. and X-Ray Chest PA and Lateral (CXR): No results found for this visit on 07/01/20.

## 2020-07-06 ENCOUNTER — TELEPHONE (OUTPATIENT)
Dept: CARDIOLOGY | Facility: CLINIC | Age: 50
End: 2020-07-06

## 2020-07-06 VITALS
DIASTOLIC BLOOD PRESSURE: 77 MMHG | SYSTOLIC BLOOD PRESSURE: 110 MMHG | HEART RATE: 99 BPM | OXYGEN SATURATION: 98 % | BODY MASS INDEX: 29.14 KG/M2 | WEIGHT: 227.06 LBS | TEMPERATURE: 98 F | HEIGHT: 74 IN | RESPIRATION RATE: 16 BRPM

## 2020-07-06 LAB
ANION GAP SERPL CALC-SCNC: 20 MMOL/L (ref 8–16)
BACTERIA BLD CULT: NORMAL
BACTERIA BLD CULT: NORMAL
BACTERIA SPEC AEROBE CULT: ABNORMAL
BASOPHILS # BLD AUTO: 0.01 K/UL (ref 0–0.2)
BASOPHILS NFR BLD: 0.1 % (ref 0–1.9)
BUN SERPL-MCNC: 97 MG/DL (ref 6–20)
CALCIUM SERPL-MCNC: 9.4 MG/DL (ref 8.7–10.5)
CHLORIDE SERPL-SCNC: 100 MMOL/L (ref 95–110)
CO2 SERPL-SCNC: 19 MMOL/L (ref 23–29)
CREAT SERPL-MCNC: 19.4 MG/DL (ref 0.5–1.4)
DIFFERENTIAL METHOD: ABNORMAL
EOSINOPHIL # BLD AUTO: 0.3 K/UL (ref 0–0.5)
EOSINOPHIL NFR BLD: 3.5 % (ref 0–8)
ERYTHROCYTE [DISTWIDTH] IN BLOOD BY AUTOMATED COUNT: 16.2 % (ref 11.5–14.5)
EST. GFR  (AFRICAN AMERICAN): 3 ML/MIN/1.73 M^2
EST. GFR  (NON AFRICAN AMERICAN): 2 ML/MIN/1.73 M^2
GLUCOSE SERPL-MCNC: 97 MG/DL (ref 70–110)
HCT VFR BLD AUTO: 24.1 % (ref 40–54)
HGB BLD-MCNC: 8 G/DL (ref 14–18)
IMM GRANULOCYTES # BLD AUTO: 0.02 K/UL (ref 0–0.04)
IMM GRANULOCYTES NFR BLD AUTO: 0.2 % (ref 0–0.5)
INR PPP: 1.9 (ref 0.8–1.2)
LYMPHOCYTES # BLD AUTO: 1.1 K/UL (ref 1–4.8)
LYMPHOCYTES NFR BLD: 13 % (ref 18–48)
MCH RBC QN AUTO: 33.2 PG (ref 27–31)
MCHC RBC AUTO-ENTMCNC: 33.2 G/DL (ref 32–36)
MCV RBC AUTO: 100 FL (ref 82–98)
MONOCYTES # BLD AUTO: 1.1 K/UL (ref 0.3–1)
MONOCYTES NFR BLD: 13.8 % (ref 4–15)
NEUTROPHILS # BLD AUTO: 5.6 K/UL (ref 1.8–7.7)
NEUTROPHILS NFR BLD: 69.4 % (ref 38–73)
NRBC BLD-RTO: 0 /100 WBC
PHOSPHATE SERPL-MCNC: 8.5 MG/DL (ref 2.7–4.5)
PLATELET # BLD AUTO: 119 K/UL (ref 150–350)
PMV BLD AUTO: 11.1 FL (ref 9.2–12.9)
POTASSIUM SERPL-SCNC: 4.7 MMOL/L (ref 3.5–5.1)
PROTHROMBIN TIME: 19.8 SEC (ref 9–12.5)
RBC # BLD AUTO: 2.41 M/UL (ref 4.6–6.2)
SODIUM SERPL-SCNC: 139 MMOL/L (ref 136–145)
VANCOMYCIN SERPL-MCNC: 28.3 UG/ML
WBC # BLD AUTO: 8.06 K/UL (ref 3.9–12.7)

## 2020-07-06 PROCEDURE — 85610 PROTHROMBIN TIME: CPT

## 2020-07-06 PROCEDURE — 63600175 PHARM REV CODE 636 W HCPCS: Performed by: SURGERY

## 2020-07-06 PROCEDURE — 80048 BASIC METABOLIC PNL TOTAL CA: CPT

## 2020-07-06 PROCEDURE — 85025 COMPLETE CBC W/AUTO DIFF WBC: CPT

## 2020-07-06 PROCEDURE — 80100014 HC HEMODIALYSIS 1:1

## 2020-07-06 PROCEDURE — 25000003 PHARM REV CODE 250: Performed by: INTERNAL MEDICINE

## 2020-07-06 PROCEDURE — 36415 COLL VENOUS BLD VENIPUNCTURE: CPT

## 2020-07-06 PROCEDURE — 25000003 PHARM REV CODE 250: Performed by: SURGERY

## 2020-07-06 PROCEDURE — 25000003 PHARM REV CODE 250: Performed by: NURSE PRACTITIONER

## 2020-07-06 PROCEDURE — 63600175 PHARM REV CODE 636 W HCPCS: Performed by: INTERNAL MEDICINE

## 2020-07-06 PROCEDURE — 99233 PR SUBSEQUENT HOSPITAL CARE,LEVL III: ICD-10-PCS | Mod: ,,, | Performed by: INTERNAL MEDICINE

## 2020-07-06 PROCEDURE — 99233 SBSQ HOSP IP/OBS HIGH 50: CPT | Mod: ,,, | Performed by: INTERNAL MEDICINE

## 2020-07-06 PROCEDURE — 80202 ASSAY OF VANCOMYCIN: CPT

## 2020-07-06 PROCEDURE — 84100 ASSAY OF PHOSPHORUS: CPT

## 2020-07-06 RX ADMIN — HYDROMORPHONE HYDROCHLORIDE 0.5 MG: 1 INJECTION, SOLUTION INTRAMUSCULAR; INTRAVENOUS; SUBCUTANEOUS at 01:07

## 2020-07-06 RX ADMIN — HYDROCODONE BITARTRATE AND ACETAMINOPHEN 1 TABLET: 5; 325 TABLET ORAL at 06:07

## 2020-07-06 RX ADMIN — EPOETIN ALFA-EPBX 20000 UNITS: 10000 INJECTION, SOLUTION INTRAVENOUS; SUBCUTANEOUS at 09:07

## 2020-07-06 NOTE — PLAN OF CARE
Patient received hd as ordered. Net removal 3 liters. No access issues. Tolerated. Dr. Ralph visited during hd.

## 2020-07-06 NOTE — PROGRESS NOTES
Pharmacokinetic Assessment Follow Up: IV Vancomycin    Vancomycin serum concentration assessment(s):  Vancomycin pre-dialysis random level @ 0520 this AM = 28.3 mcg/ml    Vancomycin Regimen Plan:  Due to pre-HD level > 25 mcg/ml, pt will NOT receive a dose of vancomycin post-HD today  Next pre-HD random level due Wed 7/8 @ 0430 with AM labs  We will re-dose for levels < 25 mcg/ml     Drug levels (last 3 results):  Recent Labs   Lab Result Units 07/06/20  0520   Vancomycin, Random ug/mL 28.3       Pharmacy will continue to follow and monitor vancomycin.    Please contact pharmacy at extension [phone] for questions regarding this assessment.    Thank you for the consult,    Katherine McArdle, Pharm.D. 7/6/2020 10:13 AM      Patient brief summary:  Isidro White Jr. is a 50 y.o. male initiated on antimicrobial therapy with IV Vancomycin for treatment of AV graft infection - MRSA    The patient's current regimen is pulse dosing PRN based on pre-dialysis random levels     Drug Allergies:   Review of patient's allergies indicates:  No Known Allergies    Actual Body Weight:   103 kg    Renal Function:   Estimated Creatinine Clearance: 5.9 mL/min (A) (based on SCr of 19.4 mg/dL (H)).     Dialysis Method (if applicable):  intermittent HD -- every Mon, Wed, Fri    CBC (last 72 hours):  Recent Labs   Lab Result Units 07/04/20  0625 07/04/20  1237 07/05/20  0612 07/06/20  0520   WBC K/uL 7.07 8.20 7.47  7.47 8.06   Hemoglobin g/dL 7.7* 8.0* 6.9*  6.9* 8.0*   Hematocrit % 24.5* 23.9* 20.9*  20.9* 24.1*   Platelets K/uL 121* 119* 116*  116* 119*   Gran% % 60.8 68.2 63.3  63.3 69.4   Lymph% % 21.5 17.2* 17.4*  17.4* 13.0*   Mono% % 14.7 12.8 16.3*  16.3* 13.8   Eosinophil% % 2.8 1.0 2.4  2.4 3.5   Basophil% % 0.1 0.2 0.3  0.3 0.1   Differential Method  Automated Automated Automated  Automated Automated       Metabolic Panel (last 72 hours):  Recent Labs   Lab Result Units 07/04/20  0625 07/05/20  0612  07/06/20  0520   Sodium mmol/L 135* 138 139   Potassium mmol/L 4.3 4.7 4.7   Chloride mmol/L 96 99 100   CO2 mmol/L 23 20* 19*   Glucose mg/dL 96 88 97   BUN, Bld mg/dL 62* 83* 97*   Creatinine mg/dL 12.9* 15.7* 19.4*   Phosphorus mg/dL 8.3* 7.9* 8.5*       Vancomycin Administrations:  vancomycin given in the last 96 hours      No antibiotic orders with administrations found.                Microbiologic Results:  Microbiology Results (last 7 days)     Procedure Component Value Units Date/Time    Blood culture [[id number]] Collected: 07/01/20 1434    Order Status: Completed Specimen: Blood Updated: 07/05/20 2212     Blood Culture, Routine No Growth to date      No Growth to date      No Growth to date      No Growth to date      No Growth to date    Blood culture [[id number]] Collected: 07/01/20 1433    Order Status: Completed Specimen: Blood Updated: 07/05/20 2212     Blood Culture, Routine No Growth to date      No Growth to date      No Growth to date      No Growth to date      No Growth to date    Aerobic culture [[id number]]  (Abnormal)  (Susceptibility) Collected: 07/02/20 1557    Order Status: Completed Specimen: Graft from Arm, Left Updated: 07/05/20 1420     Aerobic Bacterial Culture METHICILLIN RESISTANT STAPHYLOCOCCUS AUREUS  Moderate

## 2020-07-06 NOTE — PROGRESS NOTES
Coumadin Progress Note    Indication: mechanical aortic valve replacement (AVR), atrial fibrillation  Goal INR per cardiology: 2.0-3.0 (due to bleeding issues; goal previously was 2.5-3.5)  Today's INR: 1.9 (down from 2.0)  Coumadin was just restarted yesterday; held previously due to bleeding issues  Continue home regimen of 15 mg every Thursday & Saturday and 7.5 mg all other days  Pt to receive 7.5 mg dose today  Pt will be discharged today & will follow up at [facility] on Thursday  Pt has been educated by a pharmacist this admission    Thank you for allowing us to participate in this patient's care.   Katherine McArdle, Pharm.D. 7/6/2020 12:26 PM

## 2020-07-06 NOTE — ASSESSMENT & PLAN NOTE
Patient on MWF schedule at [facility].     HD today.     Access: right IJ vascath. Infected left AVG was removed on 7/2/20.

## 2020-07-06 NOTE — SUBJECTIVE & OBJECTIVE
Interval History:     7/3/20: patient is currently on hemodialysis. He is tolerating the procedure well. Vitals are stable. Patient reports left arm pain.     7/4/20: patient is resting comfortably, no complaints at present.     7/5/20: patient still with left arm pain.     7/6/20: patient is currently on hemodialysis. He is tolerating the procedure well. Vitals are stable. Patient reports mild left arm pain.           Review of patient's allergies indicates:  No Known Allergies  Current Facility-Administered Medications   Medication Frequency    0.9%  NaCl infusion (for blood administration) Q24H PRN    0.9%  NaCl infusion PRN    0.9%  NaCl infusion Once    albuterol-ipratropium 2.5 mg-0.5 mg/3 mL nebulizer solution 3 mL Q6H PRN    amLODIPine tablet 10 mg Daily    calcium acetate(phosphat bind) capsule 1,334 mg TID    diltiaZEM 24 hr capsule 360 mg Daily    docusate sodium capsule 100 mg Daily    hydrALAZINE tablet 100 mg Q8H    HYDROcodone-acetaminophen 5-325 mg per tablet 1 tablet Q4H PRN    HYDROmorphone injection 0.5 mg Q4H PRN    hydrOXYzine pamoate capsule 25 mg Q8H PRN    lisinopriL tablet 40 mg Daily    morphine injection 6 mg Q4H PRN    nozaseptin (NOZIN) nasal  BID    ondansetron injection 4 mg Q12H PRN    pantoprazole EC tablet 40 mg Daily    polyethylene glycol packet 17 g Daily    sodium chloride 0.9% flush 10 mL PRN    tenofovir tablet 300 mg Weekly    vancomycin - pharmacy to dose pharmacy to manage frequency    vit b cmplx 3-fa-vit c-biotin 1- mg-mg-mcg per tablet 1 tablet Daily    warfarin (COUMADIN) tablet 15 mg Every Thurs    [START ON 7/11/2020] warfarin (COUMADIN) tablet 15 mg Every Sat    warfarin tablet 7.5 mg Every Mon, Wed, Fri, Sun    [START ON 7/7/2020] warfarin tablet 7.5 mg Every Tues     Facility-Administered Medications Ordered in Other Encounters   Medication Frequency    0.9%  NaCl infusion Continuous    sodium chloride 0.9% flush 10 mL PRN        Objective:     Vital Signs (Most Recent):  Temp: 98.2 °F (36.8 °C) (07/06/20 0815)  Pulse: 88 (07/06/20 1030)  Resp: 20 (07/06/20 1030)  BP: 126/82 (07/06/20 1030)  SpO2: 98 % (07/06/20 0716)  O2 Device (Oxygen Therapy): room air (07/06/20 0850) Vital Signs (24h Range):  Temp:  [97.6 °F (36.4 °C)-98.6 °F (37 °C)] 98.2 °F (36.8 °C)  Pulse:  [78-95] 88  Resp:  [14-20] 20  SpO2:  [93 %-98 %] 98 %  BP: (110-172)/(61-82) 126/82     Weight: 103 kg (227 lb 1.2 oz) (07/01/20 1436)  Body mass index is 29.15 kg/m².  Body surface area is 2.32 meters squared.    I/O last 3 completed shifts:  In: 830 [P.O.:480; Blood:350]  Out: -     Physical Exam  Constitutional:       Appearance: He is well-developed.   HENT:      Head: Normocephalic.      Nose: No rhinorrhea.      Mouth/Throat:      Pharynx: No oropharyngeal exudate.   Eyes:      Pupils: Pupils are equal, round, and reactive to light.   Neck:      Thyroid: No thyroid mass.   Cardiovascular:      Rate and Rhythm: Normal rate and regular rhythm.      Heart sounds: S1 normal and S2 normal.   Pulmonary:      Effort: Pulmonary effort is normal. No respiratory distress.      Breath sounds: No wheezing.   Abdominal:      General: Bowel sounds are normal. There is no distension.      Palpations: Abdomen is soft.      Tenderness: There is no abdominal tenderness.      Hernia: No hernia is present.   Musculoskeletal:      Comments: Left arm dressed and wrapped.    Lymphadenopathy:      Cervical: No cervical adenopathy.   Skin:     General: Skin is warm and dry.   Neurological:      Mental Status: He is alert and oriented to person, place, and time.   Psychiatric:         Behavior: Behavior is cooperative.         Significant Labs:  Lab Results   Component Value Date    CREATININE 19.4 (H) 07/06/2020    BUN 97 (H) 07/06/2020     07/06/2020    K 4.7 07/06/2020     07/06/2020    CO2 19 (L) 07/06/2020     Lab Results   Component Value Date    .0 (H) 01/08/2019     CALCIUM 9.4 07/06/2020    PHOS 8.5 (H) 07/06/2020     Lab Results   Component Value Date    ALBUMIN 3.4 (L) 07/01/2020     Lab Results   Component Value Date    WBC 8.06 07/06/2020    HGB 8.0 (L) 07/06/2020    HCT 24.1 (L) 07/06/2020     (H) 07/06/2020     (L) 07/06/2020       No results for input(s): MG in the last 168 hours.      Significant Imaging:  Imaging Results    None

## 2020-07-06 NOTE — TELEPHONE ENCOUNTER
----- Message from Fuad Gudino MD sent at 7/5/2020  4:55 PM CDT -----  Regarding: coumadin clinic f/u and clinic f/u  He needs to see coumadin clinic either on Wed or Thurs, and see me within 1-2 weeks post discharge. Thanks

## 2020-07-06 NOTE — PROGRESS NOTES
Pt d/c to home. D/c instructions given, verbalized understanding. Telemetry removed. Pt stable. Awaiting ride.

## 2020-07-06 NOTE — PROGRESS NOTES
[facility] -   Nephrology  Progress Note    Patient Name: Isidro White Jr.  MRN: [id number]  Admission Date: 7/1/2020  Hospital Length of Stay: 4 days  Attending Provider: Kyle Wright MD   Primary Care Physician: Miguel Campo MD (Inactive)  Principal Problem:Arteriovenous graft infection    Subjective:     HPI: No notes on file    Interval History:     7/3/20: patient is currently on hemodialysis. He is tolerating the procedure well. Vitals are stable. Patient reports left arm pain.     7/4/20: patient is resting comfortably, no complaints at present.     7/5/20: patient still with left arm pain.     7/6/20: patient is currently on hemodialysis. He is tolerating the procedure well. Vitals are stable. Patient reports mild left arm pain.           Review of patient's allergies indicates:  No Known Allergies  Current Facility-Administered Medications   Medication Frequency    0.9%  NaCl infusion (for blood administration) Q24H PRN    0.9%  NaCl infusion PRN    0.9%  NaCl infusion Once    albuterol-ipratropium 2.5 mg-0.5 mg/3 mL nebulizer solution 3 mL Q6H PRN    amLODIPine tablet 10 mg Daily    calcium acetate(phosphat bind) capsule 1,334 mg TID    diltiaZEM 24 hr capsule 360 mg Daily    docusate sodium capsule 100 mg Daily    hydrALAZINE tablet 100 mg Q8H    HYDROcodone-acetaminophen 5-325 mg per tablet 1 tablet Q4H PRN    HYDROmorphone injection 0.5 mg Q4H PRN    hydrOXYzine pamoate capsule 25 mg Q8H PRN    lisinopriL tablet 40 mg Daily    morphine injection 6 mg Q4H PRN    nozaseptin (NOZIN) nasal  BID    ondansetron injection 4 mg Q12H PRN    pantoprazole EC tablet 40 mg Daily    polyethylene glycol packet 17 g Daily    sodium chloride 0.9% flush 10 mL PRN    tenofovir tablet 300 mg Weekly    vancomycin - pharmacy to dose pharmacy to manage frequency    vit b cmplx 3-fa-vit c-biotin 1- mg-mg-mcg per tablet 1 tablet Daily    warfarin (COUMADIN) tablet  15 mg Every Thurs    [START ON 7/11/2020] warfarin (COUMADIN) tablet 15 mg Every Sat    warfarin tablet 7.5 mg Every Mon, Wed, Fri, Sun    [START ON 7/7/2020] warfarin tablet 7.5 mg Every Tues     Facility-Administered Medications Ordered in Other Encounters   Medication Frequency    0.9%  NaCl infusion Continuous    sodium chloride 0.9% flush 10 mL PRN       Objective:     Vital Signs (Most Recent):  Temp: 98.2 °F (36.8 °C) (07/06/20 0815)  Pulse: 88 (07/06/20 1030)  Resp: 20 (07/06/20 1030)  BP: 126/82 (07/06/20 1030)  SpO2: 98 % (07/06/20 0716)  O2 Device (Oxygen Therapy): room air (07/06/20 0850) Vital Signs (24h Range):  Temp:  [97.6 °F (36.4 °C)-98.6 °F (37 °C)] 98.2 °F (36.8 °C)  Pulse:  [78-95] 88  Resp:  [14-20] 20  SpO2:  [93 %-98 %] 98 %  BP: (110-172)/(61-82) 126/82     Weight: 103 kg (227 lb 1.2 oz) (07/01/20 1436)  Body mass index is 29.15 kg/m².  Body surface area is 2.32 meters squared.    I/O last 3 completed shifts:  In: 830 [P.O.:480; Blood:350]  Out: -     Physical Exam  Constitutional:       Appearance: He is well-developed.   HENT:      Head: Normocephalic.      Nose: No rhinorrhea.      Mouth/Throat:      Pharynx: No oropharyngeal exudate.   Eyes:      Pupils: Pupils are equal, round, and reactive to light.   Neck:      Thyroid: No thyroid mass.   Cardiovascular:      Rate and Rhythm: Normal rate and regular rhythm.      Heart sounds: S1 normal and S2 normal.   Pulmonary:      Effort: Pulmonary effort is normal. No respiratory distress.      Breath sounds: No wheezing.   Abdominal:      General: Bowel sounds are normal. There is no distension.      Palpations: Abdomen is soft.      Tenderness: There is no abdominal tenderness.      Hernia: No hernia is present.   Musculoskeletal:      Comments: Left arm dressed and wrapped.    Lymphadenopathy:      Cervical: No cervical adenopathy.   Skin:     General: Skin is warm and dry.   Neurological:      Mental Status: He is alert and oriented to  person, place, and time.   Psychiatric:         Behavior: Behavior is cooperative.         Significant Labs:  Lab Results   Component Value Date    CREATININE 19.4 (H) 07/06/2020    BUN 97 (H) 07/06/2020     07/06/2020    K 4.7 07/06/2020     07/06/2020    CO2 19 (L) 07/06/2020     Lab Results   Component Value Date    .0 (H) 01/08/2019    CALCIUM 9.4 07/06/2020    PHOS 8.5 (H) 07/06/2020     Lab Results   Component Value Date    ALBUMIN 3.4 (L) 07/01/2020     Lab Results   Component Value Date    WBC 8.06 07/06/2020    HGB 8.0 (L) 07/06/2020    HCT 24.1 (L) 07/06/2020     (H) 07/06/2020     (L) 07/06/2020       No results for input(s): MG in the last 168 hours.      Significant Imaging:  Imaging Results    None           Assessment/Plan:     * Arteriovenous graft infection  Patient presented with infected left arm AVG which was removed on 7/2/20. Continue antibiotics.     Anemia associated with chronic renal failure  Epogen with HD.     Disorder of electrolytes  Hyperkalemia: has resolved.     Hyperphosphatemia: Phoslo dose was doubled.     ESRD (end stage renal disease) on dialysis  Patient on MWF schedule at [facility].     HD today.     Access: right IJ vascath. Infected left AVG was removed on 7/2/20.     Essential hypertension  Good BP control today.        Thank you for your consult. I will follow-up with patient. Please contact us if you have any additional questions.    Jacek Ralph MD  Nephrology  [facility]

## 2020-07-07 ENCOUNTER — PATIENT OUTREACH (OUTPATIENT)
Dept: ADMINISTRATIVE | Facility: CLINIC | Age: 50
End: 2020-07-07

## 2020-07-07 NOTE — PLAN OF CARE
07/07/20 1433   Final Note   Assessment Type Final Discharge Note   Anticipated Discharge Disposition Home   Right Care Referral Info   Post Acute Recommendation No Care

## 2020-07-07 NOTE — PATIENT INSTRUCTIONS
Sepsis     To treat sepsis, antibiotics and fluids may by given through an intravenous (IV) line.     Sepsis happens when your body responds with widespread inflammation to a bad infection or bacteremia--the presence of bacteria in your bloodstream. Sepsis can be deadly. Blood pressure may drop and the lungs and kidneys may start to fail. Emergency care for sepsis is crucial.  Risk factors  Those most at risk for sepsis are:  · Infants or older adults  · People who have an illness that weakens their immune system, such as cancer, AIDS, or diabetes  · People being treated with chemotherapy medicines or radiation, which weakens the immune system  · People who have had a transplant  · People with a very severe infection such as pneumonia, meningitis, or a urinary tract infection  When to go to the emergency department (ED)  Sepsis is an emergency. Go to the nearest ED if you have a fever with any of these symptoms:  · Chills and shaking  · Rapid heartbeat and breathing  · Trouble breathing  · Severe nausea or uncontrolled vomiting  · Confusion, disorientation, drowsiness, or dizziness  · Decreased urination  · Severe pain, including in the back or joints   What to expect in the ED  · Blood and urine tests are done to look for bacteria. They also check for organ failure.  · Blood, urine, or sputum cultures may be taken. The samples are sent to a lab. They are placed in a special container. Any bacteria should grow in 24 hours.  · X-rays or other imaging tests may be done.  A person with sepsis will be admitted to the hospital and treated with antibiotics. Treatment may also include oxygen and intravenous fluids.    © 3839-8993 [employer]. [address] [place] [address], [place] [address]. All rights reserved. This information is not intended as a substitute for professional medical care. Always follow your healthcare professional's instructions.

## 2020-07-08 ENCOUNTER — PATIENT OUTREACH (OUTPATIENT)
Dept: ADMINISTRATIVE | Facility: OTHER | Age: 50
End: 2020-07-08

## 2020-07-09 ENCOUNTER — ANTI-COAG VISIT (OUTPATIENT)
Dept: CARDIOLOGY | Facility: CLINIC | Age: 50
End: 2020-07-09
Payer: MEDICARE

## 2020-07-09 ENCOUNTER — OFFICE VISIT (OUTPATIENT)
Dept: UROLOGY | Facility: CLINIC | Age: 50
End: 2020-07-09
Payer: MEDICARE

## 2020-07-09 VITALS — TEMPERATURE: 97 F | BODY MASS INDEX: 29.15 KG/M2 | WEIGHT: 227 LBS

## 2020-07-09 DIAGNOSIS — Z95.2 S/P AVR (AORTIC VALVE REPLACEMENT): ICD-10-CM

## 2020-07-09 DIAGNOSIS — R31.9 HEMATURIA, UNSPECIFIED TYPE: Primary | ICD-10-CM

## 2020-07-09 DIAGNOSIS — Z79.01 ANTICOAGULATED: ICD-10-CM

## 2020-07-09 DIAGNOSIS — Z79.01 LONG TERM (CURRENT) USE OF ANTICOAGULANTS: Primary | ICD-10-CM

## 2020-07-09 LAB
BLD PROD TYP BPU: NORMAL
BLD PROD TYP BPU: NORMAL
BLOOD UNIT EXPIRATION DATE: NORMAL
BLOOD UNIT EXPIRATION DATE: NORMAL
BLOOD UNIT TYPE CODE: 5100
BLOOD UNIT TYPE CODE: 5100
BLOOD UNIT TYPE: NORMAL
BLOOD UNIT TYPE: NORMAL
CODING SYSTEM: NORMAL
CODING SYSTEM: NORMAL
DISPENSE STATUS: NORMAL
DISPENSE STATUS: NORMAL
INR PPP: 1.9 (ref 2–3)
NUM UNITS TRANS PACKED RBC: NORMAL
NUM UNITS TRANS PACKED RBC: NORMAL

## 2020-07-09 PROCEDURE — 93793 PR ANTICOAGULANT MGMT FOR PT TAKING WARFARIN: ICD-10-PCS | Mod: ,,,

## 2020-07-09 PROCEDURE — 99499 UNLISTED E&M SERVICE: CPT | Mod: S$PBB,,, | Performed by: UROLOGY

## 2020-07-09 PROCEDURE — 99499 NO LOS: ICD-10-PCS | Mod: S$PBB,,, | Performed by: UROLOGY

## 2020-07-09 PROCEDURE — 52000 PR CYSTOURETHROSCOPY: ICD-10-PCS | Mod: S$PBB,,, | Performed by: UROLOGY

## 2020-07-09 PROCEDURE — 99213 OFFICE O/P EST LOW 20 MIN: CPT | Mod: PBBFAC | Performed by: UROLOGY

## 2020-07-09 PROCEDURE — 99999 PR PBB SHADOW E&M-EST. PATIENT-LVL III: ICD-10-PCS | Mod: PBBFAC,,, | Performed by: UROLOGY

## 2020-07-09 PROCEDURE — 52000 CYSTOURETHROSCOPY: CPT | Mod: PBBFAC | Performed by: UROLOGY

## 2020-07-09 PROCEDURE — 85610 PROTHROMBIN TIME: CPT | Mod: PBBFAC

## 2020-07-09 PROCEDURE — 52000 CYSTOURETHROSCOPY: CPT | Mod: S$PBB,,, | Performed by: UROLOGY

## 2020-07-09 PROCEDURE — 93793 ANTICOAG MGMT PT WARFARIN: CPT | Mod: ,,,

## 2020-07-09 PROCEDURE — 99999 PR PBB SHADOW E&M-EST. PATIENT-LVL III: CPT | Mod: PBBFAC,,, | Performed by: UROLOGY

## 2020-07-09 NOTE — PHYSICIAN QUERY
PT Name: Isidro White Jr.  MR #: [id number]    CAUSE AND EFFECT RELATIONSHIP CLARIFICATION     CDS/: Dana Brooks RN             Contact information: [phone]    This form is a permanent document in the medical record.     Query Date: July 9, 2020    By submitting this query, we are merely seeking further clarification of documentation. Please utilize your independent clinical judgment when addressing the question(s) below.    Supporting Clinical Findings   Location in Medical Record       Bleeding from dialysis shunt                                                                                                                                                               S/P AVR (aortic valve replacement)  -History of mechanical AVR  -Bridge with heparin gtt if INR < 2.0 due to AVG bleeding/oozing           07/4/2020- bleeding at site with Coumadin on hold and Heparin gtt adjusted                       7/6 DC summary    7/4 Cardiology MD PN        7/4 Hosp med PN   Heparin gtt  Warfarin                                                                                                                                                                             7/4 MAR  7/2-4  MAR         Provider, please clarify if there is any clinical correlation between Bleeding dialysis shunt and Coumadin/Heparin.           Are the conditions:      [ X ] Due to or associated with each other   [  ] Unrelated to each other   [  ] Other explanation (Please Specify): ______________   [  ] Clinically Undetermined                                                                               Please document in your progress notes daily for the duration of treatment until resolved and include in your discharge summary.

## 2020-07-09 NOTE — PHYSICIAN QUERY
PT Name: Isidro White Jr.  MR #: [id number]    HEMATOLOGY CLARIFICATION      CDS/: Dana Brooks RN               Contact information: [phone]    This form is a permanent document in the medical record.      Query Date: July 9, 2020    By submitting this query, we are merely seeking further clarification of documentation. Please utilize your independent clinical judgment when addressing the question(s) below.    The Medical Record contains the following:   Indicators  Supporting Clinical Findings Location in Medical Record   x Anemia documented  Anemia, multifactorial, monitor hemoglobin, Procrit with dialysis, PRBC transfusion when indicated    Anemia associated with chronic renal failure- Epogen with HD    Anemia associated with chronic renal failure-H/H stable with downward trend noted in the setting of active bleeding from site -PRBCs x 1 unit given   7/1 Renal consult      7/6 Renal MD PN      7/5 Hosp med PN   x H&H H/H= 10.8/ 32.0  H/H= 6.9/ 20.9   7/1 Lab  7/5Lab   x BP                    HR BP= 119/62   HR= 91   7/5 Flowsheet    GI bleeding documented     x Acute bleeding (Non GI site)  ED physician placed several sutures and applied compression dressing. Bleed was then controlled. Dr Gleason (vascular surgery) was consulted in the ED, plans to remove access and place vascath tomorrow in OR    Pt presented to ED this am with acutely bleeding shunt.  Several sutures placed by ED staff in addition to a tourniquet applied to the left upper arm.  On my arrival, the tourniquet was taken down and the bleeding access had stopped     7/1 Hp      7/1 Surgery consult   x Transfusion(s) Transfused 1 unit PRBC   7/5 Blood bank   x Acute/Chronic illness Fistula removal and Vascular Catheter placement 02 July with resumption of hemodialysis    Bleeding from dialysis shunt  AV graft infection  NSVT  Atrial fibrillation  S/p AVR   7/4 Hosp med PN    7/6 Hosp med PN   x Treatments Tourniquet is present, area  was cleaned with Betadine. Attempted to stitch around ulcer to stop bleeding, but stitch came through tissue. Tried to close shunt, lidocaine 1 mL with epi.     General surgery consult  CBC  Type and screen  Transfuse 1 unit PRBC  Cardiac monitor  Pulse oximetry    Epoetin 10,000Units  Epoetin 20,000Units 7/1 ED MD LUIS        7/1 NSG orders              7/2-4 MAR    Other       Provider, please specify diagnosis or diagnoses associated with above clinical findings.       please jerrica all that apply      [ X  ] Acute blood loss anemia    [  X ] Acute blood loss anemia expected post-operatively    [   ] Iron deficiency anemia    [   ] Chronic blood loss anemia     [   ] Anemia of chronic kidney disease (please specify stage): _______________   [   ] Anemia due to chronic disease (please specify): _________________   [   ] Other Hematological Diagnosis (please specify): _________________   [   ] Clinically Undetermined     Present on admission (POA) status:   [   ] Yes (Y)                          [  ] Clinically Undetermined (W)  [  X ] No (N)                            [   ] Documentation insufficient to determine if condition is POA (U)          Please document in your progress notes daily for the duration of treatment, until resolved, and include in your discharge summary.

## 2020-07-09 NOTE — PROGRESS NOTES
Chief Complaint: Hematuria    HPI:   7/9/20: US redemonstrated bilateral complex cysts, no stones.  Cysto today.   6/3/20: Had some gross hematuria again a few weeks ago.  Voids once every AM and that is it.  On his way to dialysis.  Wants to be listed on the transplant list.  4/8/19: No hematuria since last visit.  Pt advised that cystoscopy is recommended and he declines.  Reviewed history in detail.  Discussion of risks/benefits was had.  2/28/19: 48 yo man voids about once a day since he is a MWF dialysis pt.  Saw a clot and gross hematuria about 2-3 weeks ago on one occasion.  No abd/pelvic pain and no exac/rel factors.  No urolithiasis.  Weak stream.  No  history.  Normal sexual function until recently.  Had a CT Urogram that shows no abnormalities except renal cystic function consistent with ESRD.    Allergies:  Patient has no known allergies.    Medications:  has a current medication list which includes the following prescription(s): albuterol, amlodipine, ascorbic acid (vitamin c), b complex vitamins, calcium acetate(phosphat bind), diltiazem, docusate sodium, epoetin vikki, fish oil-omega-3 fatty acids, hydralazine, hydrocodone-acetaminophen, hydroxyzine pamoate, labetalol, lisinopril, multivitamin with minerals, mupirocin, nozaseptin, omega-3 fatty acids-vitamin e, pantoprazole, polyethylene glycol, shawna-nataly rx, tenofovir, vitamin d, and warfarin, and the following Facility-Administered Medications: sodium chloride 0.9% and sodium chloride 0.9%.    Review of Systems:  General: No fever, chills, fatigability, or weight loss.  Skin: No rashes, itching, or changes in color or texture of skin.  Chest: Denies HAY, cyanosis, wheezing, cough, and sputum production.  Abdomen: Appetite fine. No weight loss. Denies diarrhea, abdominal pain, hematemesis, or blood in stool.  Musculoskeletal: No joint stiffness or swelling. Denies back pain.  : As above.  All other review of systems negative.    PMH:   has a past  medical history of Aortic valve stenosis, Atrial fibrillation, Atrial flutter, Cardiomyopathy, CHF (congestive heart failure), Drug-induced erectile dysfunction, ESRD due to hypertension, ESRD on dialysis, Hepatitis B, Hyperlipidemia, Hypertension, DANIEL (obstructive sleep apnea) (11/12/2019), Secondary hyperparathyroidism of renal origin, Supraventricular tachycardia, Tachycardia, and Valvular regurgitation.    PSH:   has a past surgical history that includes ASD repair; Cardiac valuve replacement (02/08/2017); Radiofrequency ablation (03/13/2017); Cardiac catheterization; AV Graft Creation (Left, 03/2017); Colonoscopy (N/A, 8/27/2019); Esophagogastroduodenoscopy (N/A, 8/27/2019); and Removal of graft (Left, 7/2/2020).    FamHx: family history includes Anesthesia problems in his paternal uncle; Diabetes in his paternal aunt and paternal aunt; Heart attack in his father; Heart failure in his paternal grandmother; Hypertension in his paternal aunt; Leukemia in his mother and paternal aunt; No Known Problems in his brother, sister, sister, and sister; Stroke in his paternal aunt; Suicide in his paternal uncle; Valvular heart disease in his maternal aunt.    SocHx:  reports that he has never smoked. He has never used smokeless tobacco. He reports that he does not drink alcohol or use drugs.      Physical Exam:  Vitals:    07/09/20 1007   Temp: 97.2 °F (36.2 °C)     General: A&Ox3, no apparent distress, no deformities  Neck: No masses, normal thyroid  Lungs: normal inspiration, no use of accessory muscles  Heart: normal pulse, no arrhythmias  Abdomen: Soft, NT, ND  Skin: The skin is warm and dry. No jaundice.  Ext: No c/c/e.  :   2/19: Test desc felicita, no abnormalities of epididymus. Penis normal, with normal penile and scrotal skin. Meatus normal.     Labs/Studies:     Procedure: Diagnostic Cystoscopy    Procedure in Detail: After proper consents were obtained, the patient was prepped and draped in normal sterile fashion  for diagnostic cystoscopy. 5 ml of lidocaine jelly was instilled in the urethra. The flexible cystoscope was then introduced into the urethra, and advanced into the bladder under direct vision. The urethral mucosa appeared normal, and no strictures were noted. The sphincter appeared to be normal, and the veru montanum was unremarkable. The prostatic mucosa and the lateral lobes of the prostate were mildly opposed. The bladder neck was normal. Inspection of the interior of the bladder was then carried out. The trigone was unremarkable, with no mucosal lesions. The ureteral orifices were normal in position and configuration. Systematic inspection of the mucosa of the bladder it was then carried out, rotating the cystoscope so that all areas of the left and right lateral walls, the dome of the bladder, and the posterior wall were all visualized. The cystoscope was then advanced further into the bladder, and maximum deflection of the scope was performed so that the bladder neck could be inspected. No mucosal lesions were noted there. The cystoscope was then removed, and the procedure terminated.     Findings: normal cysto    Impression/Plan:   1. Negative hematuria workup, no contraindications to transplant.  RTC 1 year.

## 2020-07-09 NOTE — PROGRESS NOTES
Post venous access and graft procedure on 7/01/2020.  Patient's INR is slightly low at 1.9.  Patient resumed current dose of warfarin (per calendar) post discharged.  Send to PharmD for review/dosing instructions.

## 2020-07-09 NOTE — PROGRESS NOTES
Chart reviewed.   Immunizations: Triggered Imm Registry     Orders placed: n/a  Upcoming appts to satisfy NIHARIKA topics: n/a

## 2020-07-15 ENCOUNTER — TELEPHONE (OUTPATIENT)
Dept: CARDIOLOGY | Facility: CLINIC | Age: 50
End: 2020-07-15

## 2020-07-15 ENCOUNTER — NURSE TRIAGE (OUTPATIENT)
Dept: ADMINISTRATIVE | Facility: CLINIC | Age: 50
End: 2020-07-15

## 2020-07-15 DIAGNOSIS — Z71.89 COMPLEX CARE COORDINATION: ICD-10-CM

## 2020-07-15 NOTE — TELEPHONE ENCOUNTER
Spoke with patient on behalf of post procedural symptom tracker. Pt denies difficulty breathing, cough or fever since procedure.     Reason for Disposition   [1] Follow-up call to recent contact AND [2] information only call, no triage required    Additional Information   Negative: [1] Caller is not with the adult (patient) AND [2] reporting urgent symptoms   Negative: Lab result questions   Negative: Medication questions   Negative: Caller can't be reached by phone   Negative: Caller has already spoken to PCP or another triager   Negative: RN needs further essential information from caller in order to complete triage   Negative: Requesting regular office appointment   Negative: [1] Caller requesting NON-URGENT health information AND [2] PCP's office is the best resource   Negative: Health Information question, no triage required and triager able to answer question   Negative: General information question, no triage required and triager able to answer question   Negative: Question about upcoming scheduled test, no triage required and triager able to answer question   Negative: [1] Caller is not with the adult (patient) AND [2] probable NON-URGENT symptoms    Protocols used: INFORMATION ONLY CALL - NO TRIAGE-A-

## 2020-07-15 NOTE — ASSESSMENT & PLAN NOTE
-see above plan, will reasses volume status post transfusion    pt arrives from home....as per EMT..Sydni speaking pt reports miscarried last week then this am felt anxious and short of breath." As per Interpretation Pt confirms " I was sleeping and noticed I was having difficulty breathing. No pain...I feel stressed...my baby was 20 weeks." Pt very tearful during triage. pt arrives from home....as per EMT..Sydni speaking pt reports miscarried last week then this am felt anxious and short of breath." As per Interpretation Pt confirms " I was sleeping and noticed I was having difficulty breathing. No pain...I feel stressed...my baby was 20 weeks." Pt very tearful during triage. Talking on cell phone to various family members during attempts to utilize interpretation services.... appears to become more emotional. Denies Med hx. pt arrives from home....as per EMT..Sydni speaking pt reports miscarried last week then this am felt anxious and short of breath." As per Interpretation Pt confirms " I was sleeping and noticed I was having difficulty breathing. No pain...I feel stressed...my baby was 20 weeks."Pt speaking broken English... st" I have not slept in 24 hours...I keep thinking my fault...what did I do...I was at Md office one minute everything was fine my baby was strong and next minute I delivered and baby was not alive."  Pt very tearful during triage. Talking on cell phone to various family members during attempts to utilize interpretation services.... appears to become more emotional. Denies Med hx.

## 2020-07-16 ENCOUNTER — ANTI-COAG VISIT (OUTPATIENT)
Dept: CARDIOLOGY | Facility: CLINIC | Age: 50
End: 2020-07-16
Payer: MEDICARE

## 2020-07-16 ENCOUNTER — OFFICE VISIT (OUTPATIENT)
Dept: CARDIOLOGY | Facility: CLINIC | Age: 50
End: 2020-07-16
Payer: MEDICARE

## 2020-07-16 VITALS
WEIGHT: 233.13 LBS | HEART RATE: 82 BPM | BODY MASS INDEX: 29.92 KG/M2 | HEIGHT: 74 IN | OXYGEN SATURATION: 97 % | SYSTOLIC BLOOD PRESSURE: 138 MMHG | DIASTOLIC BLOOD PRESSURE: 72 MMHG | RESPIRATION RATE: 18 BRPM

## 2020-07-16 DIAGNOSIS — I47.19 ATRIAL TACHYCARDIA: ICD-10-CM

## 2020-07-16 DIAGNOSIS — I10 ESSENTIAL HYPERTENSION: ICD-10-CM

## 2020-07-16 DIAGNOSIS — I48.0 PAROXYSMAL ATRIAL FIBRILLATION: ICD-10-CM

## 2020-07-16 DIAGNOSIS — I48.91 ATRIAL FIBRILLATION, UNSPECIFIED TYPE: ICD-10-CM

## 2020-07-16 DIAGNOSIS — I47.29 NSVT (NONSUSTAINED VENTRICULAR TACHYCARDIA): ICD-10-CM

## 2020-07-16 DIAGNOSIS — Z95.2 S/P AVR (AORTIC VALVE REPLACEMENT): ICD-10-CM

## 2020-07-16 DIAGNOSIS — Q24.9 ADULT CONGENITAL HEART DISEASE: ICD-10-CM

## 2020-07-16 DIAGNOSIS — N18.6 ESRD (END STAGE RENAL DISEASE) ON DIALYSIS: Chronic | ICD-10-CM

## 2020-07-16 DIAGNOSIS — I11.0 HYPERTENSIVE CARDIOMEGALY WITH HEART FAILURE: Primary | ICD-10-CM

## 2020-07-16 DIAGNOSIS — I50.32 CHRONIC DIASTOLIC HEART FAILURE: ICD-10-CM

## 2020-07-16 DIAGNOSIS — Z79.01 LONG TERM (CURRENT) USE OF ANTICOAGULANTS: Primary | ICD-10-CM

## 2020-07-16 DIAGNOSIS — Z98.890 HISTORY OF RADIOFREQUENCY ABLATION FOR COMPLEX RIGHT ATRIAL ARRHYTHMIA: ICD-10-CM

## 2020-07-16 DIAGNOSIS — I35.0 NONRHEUMATIC AORTIC VALVE STENOSIS: ICD-10-CM

## 2020-07-16 DIAGNOSIS — Z99.2 ESRD (END STAGE RENAL DISEASE) ON DIALYSIS: Chronic | ICD-10-CM

## 2020-07-16 LAB — INR PPP: 1.7 (ref 2–3)

## 2020-07-16 PROCEDURE — 99215 OFFICE O/P EST HI 40 MIN: CPT | Mod: S$PBB,,, | Performed by: INTERNAL MEDICINE

## 2020-07-16 PROCEDURE — 85610 PROTHROMBIN TIME: CPT | Mod: PBBFAC

## 2020-07-16 PROCEDURE — 99215 PR OFFICE/OUTPT VISIT, EST, LEVL V, 40-54 MIN: ICD-10-PCS | Mod: S$PBB,,, | Performed by: INTERNAL MEDICINE

## 2020-07-16 PROCEDURE — 99999 PR PBB SHADOW E&M-EST. PATIENT-LVL V: ICD-10-PCS | Mod: PBBFAC,,, | Performed by: INTERNAL MEDICINE

## 2020-07-16 PROCEDURE — 99215 OFFICE O/P EST HI 40 MIN: CPT | Mod: PBBFAC | Performed by: INTERNAL MEDICINE

## 2020-07-16 PROCEDURE — 99999 PR PBB SHADOW E&M-EST. PATIENT-LVL V: CPT | Mod: PBBFAC,,, | Performed by: INTERNAL MEDICINE

## 2020-07-16 NOTE — PROGRESS NOTES
"Subjective:   Patient ID:  Isidro White Jr. is a 50 y.o. male who presents for cardiac consult of Follow-up      Chest Pain   Associated symptoms include malaise/fatigue. Pertinent negatives include no palpitations or shortness of breath.   His past medical history is significant for CHF.   Congestive Heart Failure  Associated symptoms include chest pain. Pertinent negatives include no palpitations or shortness of breath.   Hypertension  Associated symptoms include chest pain and malaise/fatigue. Pertinent negatives include no palpitations or shortness of breath.     The patient came in today for cardiac consult of Follow-up    Isidro White Jr. is a 50 y.o. male pt with ASD closure at age 8 ([facility]), HFpEF, s/p AVR with a 22 mm mechanical valve and TV annuloplasty with a 28 mm ring 3/17, HTN, PHTN, SVT, EP performed RFA (3/13/17) and has been on HD - MWF since Feb 8,2016 here for CV follow up.     12/13/18  Holter from 9/4/18 revealed freq PVCs, dilt increased to 240 mg. No palpitations. Has been doing well lately, BP is well controlled. Labwork from HD has been normal. Still makes some urine, says he has some hematuria. Will need annual stress and 2D echo. He is also undergoing workup for HepB will see Dr. Youngblood.     3/15/19  Recent ER eval at [facility] - He presented with chest pain and shortness of breath. He is a dialysis patient has a history of anemia. He states "I believe that the lack of oxygen". Patient has received blood transfusions in the past but not recently. Patient had dialysis yesterday and is a Monday Wednesday Friday dialysis patient. His troponin is mildly elevated. His chest x-ray is relatively clear. We do not see a large effusion and there is no obvious infiltrate at this time. Patient states at times he has chest pain and it feels like he is smothering. The safest plan is to admit the patient and repeat his cardiac enzymes and consider given the patient a blood transfusion. " However the patient is a dialysis patient so we are unable to admit him at this facility. Patient nephrologist is an [facility] and [facility]  Pt Left AMA and now here for follow up.   Pt has SOB usually at night, feels suffocated and can hear himself snore and wakes up. No prior sleep study.      6/18/19  Pt will need EGD/Cscope. Will be bridged with heparin while holding coumadin and followed at coumadin clinic. Pt also needs liver biopsy, he will be ruled out for esophageal varicies. He also had recent admission for PNA, tx with abx. Now feels well, breathing normal. No CP/SOB.     9/19/19  Per recent workup - Fibroscan suggests cirrhosis and he has thrombocytopenia though no other findings of portal hypertension on imaging or EGD. OK to proceed with kidney transplant from hepatology standpoint. For renal transplant process- recent stress and echo are normal.     2/27/20  He was prescibed meds for ED - he was given Cialis 5 mg. Overall feels well, no CP/SOB. He was denied transplant recently but will have further workup next month with stress and echo. He will also need sleep study.   ECG - NSR, LAE, LAD, nonspec changes    6/23/20  ECHO and stress neg in 3/2020. ECHO with grade 2 DD, PA pressure 42 mmHg. Recent INR therapeutic. Sleep study since last visit neg.   Occ decreased energy/fatigue with exertion. He walks 6 labs around the park at times but other times can't do as much. Dry weight is 101 kg. Today he is 103 kg.  He will have cystoscopy in July by Dr. Bass.     7/16/20 - Hosp follow up   He was admitted to Medical Surgical Unit for Arteriovenous graft infection with left graft excision per Vascular Surgery.  IV antibiotics continued.  Heparin gtt noted.  Coumadin continued with pharmacy to dose.  Increased bleeding noted with anticoagulation adjusted.  H/H stable with downward trend noted. On 7/5/2020, pt H/H declined with PRBCs x 1 unit given.  Pt remained stable and no  bleeding from AVG site appreciated. He had NSVT BB and CCB adjusted as well.     INR 1.9 last week. He had to get PRBC 2 days due to HGB 6. He was scheduled to get staples out on Monday by Dr. Sidhu, had swelling and bleeding from AVG. He had a hematoma. He went to HD yesterday AM and was told his HGB was 7.0. He went to BR. His Epo was increased and iron increased.      Patient feels no chest pain, no leg swelling, no PND, no palpitation, no dizziness, no syncope, no CNS symptoms.    Patient has fairly good exercise tolerance.     Patient is compliant with medications.    3/10/20 Nuclear Stress    The perfusion scan is free of evidence from myocardial ischemia or injury.    There is a  mild intensity fixed defect in the  wall of the left ventricle secondary to diaphragm attenuation.    There is a  intensity defect in the inferior wall of the left ventricle consistent with the RV insertion site.    Gated perfusion images showed an ejection fraction of 50% at rest and 50% post stress.    There is normal wall motion at rest and post stress.    The EKG portion of this study is negative for ischemia.    3/10/20 ECHO  · Mild concentric left ventricular hypertrophy.  · Moderate left atrial enlargement.  · Normal left ventricular systolic function. The estimated ejection fraction is 60%.  · Grade II (moderate) left ventricular diastolic dysfunction consistent with pseudonormalization.  · Normal right ventricular systolic function.  · There is a mechanical aortic valve present. Mean gradient is 38 mmHg.  · Mild mitral regurgitation.  · Mild tricuspid regurgitation.  · Normal central venous pressure (3 mmHg).  · The estimated PA systolic pressure is 42 mmHg.  · Pulmonary hypertension present.  · Mild mitral stenosis.  · Mild right ventricular enlargement.        9/4/18 HOLTER  TEST DESCRIPTION   PREDOMINANT RHYTHM  1. Sinus rhythm with heart rates varying between 69 and 124 bpm with an average of 85 bpm.    VENTRICULAR ARRHYTHMIAS  1. There were frequent PVCs totalling 1738 and averaging 36 per hour.  There were 9 bigeminal cycles.  There were 3 triplets.   2. There was one (6 beat) run of non-sustained ventricular tachycardia. The rate was 125 bpm.     SUPRA VENTRICULAR ARRHYTHMIAS  1. There were occasional PACs totalling 995 and averaging 20 per hour.  There were 9 triplets.   2. There were 4 (12 beat) runs of non-sustained SVT. The rate was 145 bpm.        Cath 2/17  1. Coronary angiography.      a.     Left main widely patent.      b.     LAD widely patent.      c.     Ramus widely patent and massive in size.      d.     Circumflex widely patent with a small OM 1 and OM 2 branch.      e.     Right coronary widely patent.  2. Hemodynamics:  Right heart catheterization.      a.     Pulmonary capillary wedge pressure 33 at end-expiration.      b.     Pulmonary artery pressure 78/42.      c.     Right ventricular pressure 70/21.      d.     Right atrial pressure 22 with a peak V-wave of 24.      e.     Cardiac output by thermodilution is between 6 and 7       L/minute, by Patricia 6.1 L/minute.    CONCLUSION  1. Normal coronary arteries.  2. Pulmonary hypertension.  3. Severe aortic insufficiency.      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     HD M, W, F    ESRD on dialysis     Hepatitis B     Hyperlipidemia     Hypertension     DANIEL (obstructive sleep apnea) 11/12/2019    Secondary hyperparathyroidism of renal origin     Supraventricular tachycardia     atrial tachycardia    Tachycardia     Valvular regurgitation     AI, TR       Past Surgical History:   Procedure Laterality Date    ASD REPAIR      age 8 [facility]    AV Graft Creation Left 03/2017    CARD[facility]AC [facility]HETER[facility]ATION      [facility]     CARDIAC VALVE SURGERY  02/08/2017    22 mm Medtronic AV, 28 mm TV Medtronic annuloplaty ring    COLONOSCOPY N/A 8/27/2019    Procedure: COLONOSCOPY;  Surgeon: Addie John MD;  Location: [facility] ENDO;  Service: Endoscopy;  Laterality: N/A;  dialysis pt *needs K*    ESOPHAGOGASTRODUODENOSCOPY N/A 8/27/2019    Procedure: EGD (ESOPHAGOGASTRODUODENOSCOPY);  Surgeon: Addie John MD;  Location: [facility] ENDO;  Service: Endoscopy;  Laterality: N/A;    RADIOFREQUENCY ABLATION  03/13/2017    Atrial tachycardia    REMOVAL OF GRAFT Left 7/2/2020    Procedure: REMOVAL, GRAFT;  Surgeon: Sascha Sidhu MD;  Location: [facility] OR;  Service: Peripheral Vascular;  Laterality: Left;       Social History     Tobacco Use    Smoking status: Never Smoker    Smokeless tobacco: Never Used   Substance Use Topics    Alcohol use: No     Frequency: Never     Comment: quit in 2016; does have heavy drinking history; started drinking at age 13; was drinking a 12 pack over the weekend and two 24 ounces of beer a day during the week along with a pint of hard alcohol    Drug use: No       Family History   Problem Relation Age of Onset    Leukemia Mother     Heart attack Father         massive MI at age 67    No Known Problems Sister     No Known Problems Brother     No Known Problems Sister     No Known Problems Sister     Heart failure Paternal Grandmother     Diabetes Paternal Aunt     Hypertension Paternal Aunt     Leukemia Paternal Aunt         CLL    Suicide Paternal Uncle     Anesthesia problems Paternal Uncle     Diabetes Paternal Aunt     Stroke Paternal Aunt     Valvular heart disease Maternal Aunt     Kidney disease Neg Hx     Colon cancer Neg Hx        Patient's Medications   New Prescriptions    No medications on file   Previous Medications    ALBUTEROL (VENTOLIN HFA) 90 MCG/ACTUATION INHALER    Inhale 2 puffs into the lungs every 6 (six) hours as needed for Wheezing or Shortness of Breath. Rescue     AMLODIPINE (NORVASC) 10 MG TABLET    TAKE ONE TABLET BY MOUTH EVERY DAY    ASCORBIC ACID, VITAMIN C, (VITAMIN C) 500 MG TABLET    Take 1 tablet (500 mg total) by mouth 2 (two) times daily.    B COMPLEX VITAMINS TABLET    Take 1 tablet by mouth once daily.    CALCIUM ACETATE,PHOSPHAT BIND, (PHOSLO) 667 MG CAPSULE    Take 2 capsules (1,334 mg total) by mouth 3 (three) times daily.    DILTIAZEM (CARDIZEM CD) 360 MG 24 HR CAPSULE    Take 1 capsule (360 mg total) by mouth once daily.    DOCUSATE SODIUM (COLACE) 100 MG CAPSULE    Take 1 capsule (100 mg total) by mouth 2 (two) times daily.    EPOETIN TORRIE (PROCRIT) 10,000 UNIT/ML INJECTION    Inject 1 mL (10,000 Units total) into the skin every Mon, Wed, Fri. To be given with HD    FISH OIL-OMEGA-3 FATTY ACIDS 300-1,000 MG CAPSULE    Take 2 g by mouth once daily.    HYDRALAZINE (APRESOLINE) 100 MG TABLET    TAKE ONE TABLET BY MOUTH THREE TIMES DAILY INCLUDING DIALYSIS DAYS    HYDROCODONE-ACETAMINOPHEN (NORCO) 5-325 MG PER TABLET    Take 1 tablet by mouth every 4 (four) hours as needed for pain    HYDROCODONE-ACETAMINOPHEN (NORCO) 7.5-325 MG PER TABLET    Take 1 tablet by mouth every 4 (four) hours as needed for pain    HYDROXYZINE PAMOATE (VISTARIL) 25 MG CAP    T1C PO QD PRN ITCHING    LABETALOL (NORMODYNE) 100 MG TABLET    TAKE ONE TABLET BY MOUTH THREE TIMES DAILY AS NEEDED FOR HEART RATE > 120    LISINOPRIL (PRINIVIL,ZESTRIL) 40 MG TABLET    TAKE ONE TABLET BY MOUTH EVERY DAY    MULTIVITAMIN WITH MINERALS TABLET    Take 1 tablet by mouth once daily.    OMEGA-3 FATTY ACIDS-VITAMIN E (FISH OIL) 1,000 MG CAP    Take 1 capsule by mouth once daily.    PANTOPRAZOLE (PROTONIX) 40 MG TABLET    TAKE ONE TABLET BY MOUTH EVERY DAY    POLYETHYLENE GLYCOL (GLYCOLAX) 17 GRAM PWPK    Take 17 g by mouth once daily.    ETHAN-RACQUEL RX 1- MG-MG-MCG TAB    TAKE ONE TABLET BY MOUTH EVERY DAY    TENOFOVIR (VIREAD) 300 MG TAB    Take 1 tablet (300 mg total) by mouth once a week.     "VITAMIN D (VITAMIN D3) 1000 UNITS TAB    Take 1,000 Units by mouth 3 (three) times a week.    WARFARIN (COUMADIN) 7.5 MG TABLET    Take 2 tablets by mouth on Thursdays and 1 tablet on all other days of the week.   Modified Medications    No medications on file   Discontinued Medications    No medications on file       Review of Systems   Constitutional: Positive for malaise/fatigue.   HENT: Negative.    Eyes: Negative.    Respiratory: Negative for shortness of breath.    Cardiovascular: Positive for chest pain. Negative for palpitations.   Gastrointestinal: Negative.    Genitourinary: Positive for hematuria.   Musculoskeletal: Negative.    Skin: Negative.    Neurological: Negative.    Endo/Heme/Allergies: Negative.    Psychiatric/Behavioral: Negative.    All 12 systems otherwise negative.      Wt Readings from Last 3 Encounters:   07/16/20 105.7 kg (233 lb 2.2 oz)   07/09/20 103 kg (227 lb)   07/01/20 103 kg (227 lb 1.2 oz)     Temp Readings from Last 3 Encounters:   07/09/20 97.2 °F (36.2 °C) (Temporal)   07/06/20 98 °F (36.7 °C)   06/03/20 98.6 °F (37 °C) (Temporal)     BP Readings from Last 3 Encounters:   07/16/20 138/72   07/06/20 110/77   06/23/20 112/72     Pulse Readings from Last 3 Encounters:   07/16/20 82   07/06/20 99   06/23/20 85       /72 (BP Location: Right arm, Patient Position: Sitting, BP Method: Medium (Manual))   Pulse 82   Resp 18   Ht 6' 2" (1.88 m)   Wt 105.7 kg (233 lb 2.2 oz)   SpO2 97%   BMI 29.93 kg/m²      Objective:   Physical Exam   Constitutional: He is oriented to person, place, and time. He appears well-developed and well-nourished. No distress.   HENT:   Head: Normocephalic and atraumatic.   Nose: Nose normal.   Mouth/Throat: Oropharynx is clear and moist.   Eyes: Conjunctivae and EOM are normal. No scleral icterus.   Neck: Normal range of motion. Neck supple. No JVD present. No thyromegaly present.   Cardiovascular: Normal rate, regular rhythm, S1 normal and S2 normal. " Exam reveals no gallop, no S3, no S4 and no friction rub.   Murmur heard.   Midsystolic murmur is present at the upper right sternal border.  [place] S2   Pulmonary/Chest: Effort normal and breath sounds normal. No stridor. No respiratory distress. He has no wheezes. He has no rales. He exhibits no tenderness.   Abdominal: Soft. Bowel sounds are normal. He exhibits no distension and no mass. There is no abdominal tenderness. There is no rebound.   Genitourinary:    Genitourinary Comments: Deferred     Musculoskeletal: Normal range of motion.         General: No tenderness, deformity or edema.   Lymphadenopathy:     He has no cervical adenopathy.   Neurological: He is alert and oriented to person, place, and time. He exhibits normal muscle tone. Coordination normal.   Skin: Skin is warm and dry. No rash noted. He is not diaphoretic. No erythema. No pallor.   Psychiatric: He has a normal mood and affect. His behavior is normal. Judgment and thought content normal.   Nursing note and vitals reviewed.      Lab Results   Component Value Date     07/06/2020    K 4.7 07/06/2020     07/06/2020    CO2 19 (L) 07/06/2020    BUN 97 (H) 07/06/2020    CREATININE 19.4 (H) 07/06/2020    GLU 97 07/06/2020    HGBA1C 4.8 09/20/2019    MG 2.1 04/30/2019    AST 17 07/01/2020    ALT 18 07/01/2020    ALBUMIN 3.4 (L) 07/01/2020    PROT 8.6 (H) 07/01/2020    BILITOT 0.5 07/01/2020    WBC 8.06 07/06/2020    HGB 8.0 (L) 07/06/2020    HCT 24.1 (L) 07/06/2020    HCT 25 (L) 02/08/2017     (H) 07/06/2020     (L) 07/06/2020    INR 1.9 (A) 07/09/2020    INR 1.9 (H) 07/06/2020    CHOL 213 (H) 01/08/2019    HDL 50 01/08/2019    LDLCALC 142.4 01/08/2019    TRIG 103 01/08/2019     (H) 07/03/2020     Assessment:      1. Hypertensive cardiomegaly with heart failure    2. Nonrheumatic aortic valve stenosis    3. Adult congenital heart disease    4. S/P AVR (aortic valve replacement)    5. Chronic diastolic heart failure     6. Atrial tachycardia    7. History of radiofrequency ablation for complex right atrial arrhythmia    8. Paroxysmal atrial fibrillation    9. NSVT (nonsustained ventricular tachycardia)    10. Essential hypertension    11. ESRD (end stage renal disease) on dialysis        Plan:   1. Chronic Diastolic HF  - recovered EF  - cont low salt diet  - pt euvolemic     2. S/p mech AVR  - cont coumadin, check INR  - f/u with coumadin clinic here    3. ESRD  - cont HD  - stable CV status for transplant, recent stress/echo negative    4. HTN  - cont meds    6. PVCs, SVT s/p RFA   - cont BB and CCB    7. HLD  - cont statin    8. Pre-OP CV evaluation prior to renal transplant  Moderate periop risk of CV events for high risk procedure.  Good functional and exercise capacity.  No chest pain, active arrhythmia and CHF symptoms.  Continue BB and Statin periop.  Need to have stress and echo repeat in 3/2021    9. ED  - ok to start treatment - rec Cialis  - monitor BP, avoid nitrates     10. Pulm HTN/ DANIEL  - no significant apnea on recent study  - f/u with pulm  - cont tx    Thank you for allowing me to participate in this patient's care. Please do not hesitate to contact me with any questions or concerns. Consult note has been forwarded to the referral physician.

## 2020-07-23 ENCOUNTER — ANTI-COAG VISIT (OUTPATIENT)
Dept: CARDIOLOGY | Facility: CLINIC | Age: 50
End: 2020-07-23
Payer: MEDICARE

## 2020-07-23 DIAGNOSIS — Z79.01 LONG TERM (CURRENT) USE OF ANTICOAGULANTS: Primary | ICD-10-CM

## 2020-07-23 LAB — INR PPP: 2.5 (ref 2–3)

## 2020-07-23 PROCEDURE — 85610 PROTHROMBIN TIME: CPT | Mod: PBBFAC

## 2020-07-23 NOTE — PROGRESS NOTES
INR at goal. Medications and chart reviewed. No changes noted to necessitate adjustment of warfarin or follow-up plan. See calendar.  We will stop warfarin for his procedure starting 725/20.  Dr Sidhu at an outside facility has start enoxaparin q 24 hours for him as bridge despite his renal function and ineffective clearance of LMWH on HD.  I have called his office to confirm this therapeutic choice,  per MEG Kauffman at Dr Sidhu's office he will be placed on enoxaparin.  He was notified of the increased risk of bleeding and when to report the ED.  We will follow up with him post procedure on 8/4/20.

## 2020-07-24 ENCOUNTER — TELEPHONE (OUTPATIENT)
Dept: TRANSPLANT | Facility: CLINIC | Age: 50
End: 2020-07-24

## 2020-07-24 NOTE — TELEPHONE ENCOUNTER
Call returned. Isidro wanted to know what to do for transplant. He has done all he was required to do for re referral except for colonoscopy. His dialysis nurse is notified that once he completes his colonoscopy he can be re referred.    ----- Message from Jacey Murrieta sent at 7/24/2020  1:39 PM CDT -----  Contact: pt  Patient calling to speak with nurse about transplant status          Call Back : [phone]

## 2020-07-30 ENCOUNTER — TELEPHONE (OUTPATIENT)
Dept: TRANSPLANT | Facility: CLINIC | Age: 50
End: 2020-07-30

## 2020-07-30 NOTE — TELEPHONE ENCOUNTER
This call was returned however I got voice mail. I left a message for return call.    ----- Message from Ingrid Mixon RN sent at 7/30/2020 10:02 AM CDT -----  Contact: Kiley Zurita    ----- Message -----  From: Mirella Lr  Sent: 7/30/2020   9:58 AM CDT  To: [facility] Pre-Kidney Transplant Clinical    Kiley Zurita is calling to speak to a coordinator      Mrs Zurita Contact [phone]

## 2020-08-04 ENCOUNTER — TELEPHONE (OUTPATIENT)
Dept: ENDOSCOPY | Facility: HOSPITAL | Age: 50
End: 2020-08-04

## 2020-08-04 ENCOUNTER — ANTI-COAG VISIT (OUTPATIENT)
Dept: CARDIOLOGY | Facility: CLINIC | Age: 50
End: 2020-08-04
Payer: MEDICARE

## 2020-08-04 DIAGNOSIS — Z79.01 LONG TERM (CURRENT) USE OF ANTICOAGULANTS: Primary | ICD-10-CM

## 2020-08-04 LAB — INR PPP: 1.6 (ref 2–3)

## 2020-08-04 PROCEDURE — 93793 PR ANTICOAGULANT MGMT FOR PT TAKING WARFARIN: ICD-10-PCS | Mod: ,,,

## 2020-08-04 PROCEDURE — 85610 PROTHROMBIN TIME: CPT | Mod: PBBFAC

## 2020-08-04 PROCEDURE — 93793 ANTICOAG MGMT PT WARFARIN: CPT | Mod: ,,,

## 2020-08-04 NOTE — TELEPHONE ENCOUNTER
----- Message from Lubna Adams sent at 8/4/2020  8:55 AM CDT -----  Regarding: Colonoscopy Referral  Good Morning,    Tara Yoon NP would like to refer the following patient to [facility] for a colonoscopy. I have a scanned the following patient's referral and records into . If there are any further questions in regards to the patient, please contact me at my extension.     Thank you,   [facility]

## 2020-08-04 NOTE — PROGRESS NOTES
Patient's INR is subtherapeutic at 1.6. Patient reports Lovenox was discontinued on 7/29/20 after surgery and warfarin restarted on 7/30/20. Patient reports no other changes. Patient reports planned colonoscopy soon which is unscheduled. Advised patient to notify our office once scheduled. Instructions given: Will give boosted dose of warfarin 18.75 mg today --only 8/4/20; then resume warfarin 15 mg on  Tuesdays, Thursdays and Saturdays and 7.5 mg all other days. Recheck on 8/11/20. Patient verbalizes understanding.

## 2020-08-05 ENCOUNTER — TELEPHONE (OUTPATIENT)
Dept: NEPHROLOGY | Facility: CLINIC | Age: 50
End: 2020-08-05

## 2020-08-05 NOTE — TELEPHONE ENCOUNTER
"Pt says dr muniz told him to take the diltiazem one before hd and one at night. He is also taking amlodipine daily. He is out of meds. Sent message to dr croft and then he said he cannot exceed the max dosage." still need to know if he wants pt to cont. sent him a message,.8/5/2020/0816/sf  "

## 2020-08-05 NOTE — TELEPHONE ENCOUNTER
----- Message from Sayda Hi MD sent at 8/4/2020  5:48 PM CDT -----  Regarding: RE: Rx  Contact: Pharmacy [phone]  No pt can not. I agree with Dr. Gudino with cardiology. Pt can not exceed max dose of diltiazem. Please let him know. Thanks  ----- Message -----  From: Lupe Sullivan LPN  Sent: 8/4/2020   2:59 PM CDT  To: Sayda Hi MD  Subject: FW: Rx                                           Pt says he takes 2 diltiazem 360 mg 24 hr capsule daily and the amlodipine 10mg  daily. Cardio susan says he would not ok 2 diltiazem daily with the amlodipine . Please advise if he is to take 2 daily on hd days please give new rx to say that,please.  ----- Message -----  From: Gus Hurst MD  Sent: 8/4/2020   2:55 PM CDT  To: Lupe Sullivan LPN  Subject: RE: Rx                                           Ok, please cancel the prescription and have pt discuss with Dr Hi at dialysis unit for refill   ----- Message -----  From: Lupe Sullivan LPN  Sent: 8/4/2020   2:55 PM CDT  To: Gus Hurst MD  Subject: FW: Rx                                             ----- Message -----  From: Jaren Bundy  Sent: 8/4/2020  10:31 AM CDT  To: Natali CAMPOVERDE Staff  Subject: Rx                                               Pharmacy is calling to clarify an RX.  RX name:   amLODIPine (NORVASC) 10 MG tablet     What do they need to clarify:  the above Rx was requested, but the patient also take Rx  diltiaZEM (CARDIZEM CD) 360 MG 24 hr capsule  want to make sure office is aware prior to refill the request  Comments:     Please call an advise  Thank you

## 2020-08-06 ENCOUNTER — TELEPHONE (OUTPATIENT)
Dept: NEPHROLOGY | Facility: CLINIC | Age: 50
End: 2020-08-06

## 2020-08-06 NOTE — TELEPHONE ENCOUNTER
Called pt to ensure that he is taking diltiazem as the cardio told him. No vm no answer.8/6/2020/0759/sf

## 2020-08-06 NOTE — TELEPHONE ENCOUNTER
----- Message from Sayda Hi MD sent at 8/5/2020  5:35 PM CDT -----  Regarding: RE: Rx  Contact: Pharmacy [phone]  No, please follow what the cardiology is recommending. That is a cardiac medication.  ----- Message -----  From: Lupe Sullivan LPN  Sent: 8/5/2020   8:10 AM CDT  To: Sayda Hi MD  Subject: FW: Rx                                           Pt says dr hurst told him to take one before hd and one at night.do you want it that way ? If so, please write a rx. He is out of the diltiazem regardless. Please advise.  ----- Message -----  From: Sayda Hi MD  Sent: 8/4/2020   5:48 PM CDT  To: Lupe Sullivan LPN  Subject: RE: Rx                                           No pt can not. I agree with Dr. Gudino with cardiology. Pt can not exceed max dose of diltiazem. Please let him know. Thanks  ----- Message -----  From: Lupe Sullivan LPN  Sent: 8/4/2020   2:59 PM CDT  To: Sayda Hi MD  Subject: FW: Rx                                           Pt says he takes 2 diltiazem 360 mg 24 hr capsule daily and the amlodipine 10mg  daily. Cardio malur says he would not ok 2 diltiazem daily with the amlodipine . Please advise if he is to take 2 daily on hd days please give new rx to say that,please.  ----- Message -----  From: Gus Hurst MD  Sent: 8/4/2020   2:55 PM CDT  To: Lupe Sullivan LPN  Subject: RE: Rx                                           Ok, please cancel the prescription and have pt discuss with Dr Hi at dialysis unit for refill   ----- Message -----  From: Lupe Sullivan LPN  Sent: 8/4/2020   2:55 PM CDT  To: Gus Hurst MD  Subject: FW: Rx                                             ----- Message -----  From: Jaren Bundy  Sent: 8/4/2020  10:31 AM CDT  To: Natali CAMPOVERDE Staff  Subject: Rx                                               Pharmacy is calling to clarify an RX.  RX name:   amLODIPine (NORVASC) 10 MG tablet     What do they need to clarify:  the above Rx was  requested, but the patient also take Rx  diltiaZEM (CARDIZEM CD) 360 MG 24 hr capsule  want to make sure office is aware prior to refill the request  Comments:     Please call an advise  Thank you

## 2020-08-11 ENCOUNTER — ANTI-COAG VISIT (OUTPATIENT)
Dept: CARDIOLOGY | Facility: CLINIC | Age: 50
End: 2020-08-11
Payer: MEDICARE

## 2020-08-11 DIAGNOSIS — I48.91 ATRIAL FIBRILLATION, UNSPECIFIED TYPE: ICD-10-CM

## 2020-08-11 DIAGNOSIS — Z95.2 S/P AVR (AORTIC VALVE REPLACEMENT): ICD-10-CM

## 2020-08-11 DIAGNOSIS — Z79.01 LONG TERM (CURRENT) USE OF ANTICOAGULANTS: Primary | ICD-10-CM

## 2020-08-11 LAB — INR PPP: 2.6 (ref 2–3)

## 2020-08-11 PROCEDURE — 85610 PROTHROMBIN TIME: CPT | Mod: PBBFAC

## 2020-08-11 PROCEDURE — 93793 PR ANTICOAGULANT MGMT FOR PT TAKING WARFARIN: ICD-10-PCS | Mod: ,,,

## 2020-08-11 PROCEDURE — 93793 ANTICOAG MGMT PT WARFARIN: CPT | Mod: ,,,

## 2020-08-11 NOTE — PROGRESS NOTES
Patient's INR is therapeutic at 2.6.  Previous instructions were followed.  No change in dose - instructed to maintain previous warfarin dose of 15 mg every Tuesday, Thursday, and Saturday; and 7.5 mg on all other days per week.  Recheck in 1 week.  Dose calendar given and reviewed with patient.  Patient verbalizes understanding.

## 2020-08-11 NOTE — PROGRESS NOTES
INR at goal. Medications and chart reviewed. No changes noted to necessitate adjustment of warfarin or follow-up plan. See calendar.  Pt reports upcoming colonoscopy.  Would recommend no lovenox bridge or if bridging is necessary, to bridge the patient with Heparin inpatient.

## 2020-08-17 ENCOUNTER — PATIENT OUTREACH (OUTPATIENT)
Dept: ADMINISTRATIVE | Facility: OTHER | Age: 50
End: 2020-08-17

## 2020-08-17 NOTE — PROGRESS NOTES
Health Maintenance Due   Topic Date Due    Pneumococcal Vaccine (Highest Risk) (2 of 3 - PPSV23) 05/23/2019     Updates were requested from care everywhere.  Chart was reviewed for overdue Proactive [facility] Encounters (NIHARIKA) topics (CRS, Breast Cancer Screening, Eye exam)  Health Maintenance has been updated.  LINKS immunization registry triggered.  Immunizations were reconciled.

## 2020-08-18 ENCOUNTER — TELEPHONE (OUTPATIENT)
Dept: GASTROENTEROLOGY | Facility: CLINIC | Age: 50
End: 2020-08-18

## 2020-08-18 ENCOUNTER — ANTI-COAG VISIT (OUTPATIENT)
Dept: CARDIOLOGY | Facility: CLINIC | Age: 50
End: 2020-08-18
Payer: MEDICARE

## 2020-08-18 ENCOUNTER — OFFICE VISIT (OUTPATIENT)
Dept: GASTROENTEROLOGY | Facility: CLINIC | Age: 50
End: 2020-08-18
Payer: MEDICARE

## 2020-08-18 VITALS
WEIGHT: 228.81 LBS | SYSTOLIC BLOOD PRESSURE: 126 MMHG | OXYGEN SATURATION: 96 % | HEART RATE: 96 BPM | DIASTOLIC BLOOD PRESSURE: 60 MMHG | BODY MASS INDEX: 29.37 KG/M2 | HEIGHT: 74 IN

## 2020-08-18 DIAGNOSIS — Z79.01 LONG TERM (CURRENT) USE OF ANTICOAGULANTS: Primary | ICD-10-CM

## 2020-08-18 DIAGNOSIS — Z13.9 SCREENING PROCEDURE: ICD-10-CM

## 2020-08-18 DIAGNOSIS — Z95.2 S/P AVR (AORTIC VALVE REPLACEMENT): ICD-10-CM

## 2020-08-18 DIAGNOSIS — Z86.010 HISTORY OF COLON POLYPS: Primary | ICD-10-CM

## 2020-08-18 DIAGNOSIS — N18.6 END STAGE RENAL DISEASE: ICD-10-CM

## 2020-08-18 LAB — INR PPP: 5 (ref 2–3)

## 2020-08-18 PROCEDURE — 85610 PROTHROMBIN TIME: CPT | Mod: PBBFAC

## 2020-08-18 PROCEDURE — 99999 PR PBB SHADOW E&M-EST. PATIENT-LVL III: CPT | Mod: PBBFAC,,, | Performed by: PHYSICIAN ASSISTANT

## 2020-08-18 PROCEDURE — 93793 PR ANTICOAGULANT MGMT FOR PT TAKING WARFARIN: ICD-10-PCS | Mod: ,,,

## 2020-08-18 PROCEDURE — 99213 OFFICE O/P EST LOW 20 MIN: CPT | Mod: S$PBB,,, | Performed by: PHYSICIAN ASSISTANT

## 2020-08-18 PROCEDURE — 93793 ANTICOAG MGMT PT WARFARIN: CPT | Mod: ,,,

## 2020-08-18 PROCEDURE — 99213 PR OFFICE/OUTPT VISIT, EST, LEVL III, 20-29 MIN: ICD-10-PCS | Mod: S$PBB,,, | Performed by: PHYSICIAN ASSISTANT

## 2020-08-18 PROCEDURE — 99213 OFFICE O/P EST LOW 20 MIN: CPT | Mod: PBBFAC | Performed by: PHYSICIAN ASSISTANT

## 2020-08-18 PROCEDURE — 99999 PR PBB SHADOW E&M-EST. PATIENT-LVL III: ICD-10-PCS | Mod: PBBFAC,,, | Performed by: PHYSICIAN ASSISTANT

## 2020-08-18 RX ORDER — POLYETHYLENE GLYCOL 3350, SODIUM SULFATE ANHYDROUS, SODIUM BICARBONATE, SODIUM CHLORIDE, POTASSIUM CHLORIDE 236; 22.74; 6.74; 5.86; 2.97 G/4L; G/4L; G/4L; G/4L; G/4L
4 POWDER, FOR SOLUTION ORAL ONCE
Qty: 4000 ML | Refills: 0 | Status: SHIPPED | OUTPATIENT
Start: 2020-08-18 | End: 2020-08-19

## 2020-08-18 NOTE — TELEPHONE ENCOUNTER
Location Screening:    If answers yes to any of the following, schedule at [facility] ONLY. If No, OK for either location.    1. Is there a diagnosis of heart failure, severe heart valve disease (aortic stenosis) or mechanical valve? yes  a. Is the Left Ventricle Ejection Fraction <30% ? not applicable    2. Does the pt have pulmonary hypertension? no   a. Is pulmonary arterial pressure gradient >50mmHg? not applicable   b. Is there evidence of right ventricular dysfunction? not applicable    3. Does the pt have achalasia? no    4. Any history of negative reaction to anesthesia? no   a. Myasthenia gravis? no   b. Malignant hyperthermia? not applicable   c. Other? no    5. Is procedure for esophageal banding? no      COVID Screening    1. Have you had a fever in the last 7 days or have you used fever reducing medicines for a fever in the last 7 days?  no    2. Are you experiencing shortness of breath, cough, muscle aches, loss of taste or loss of smell?  no    If answered yes to questions 1 and 2, the patient must seek medical attention with their PCP.  Do not schedule their procedure.     3. Are you residing with anyone who has tested positive for Covid?  no    If answered yes to question 3, recommend 14 day self-quarantine from the date of relative's positive test and place special needs note in the depot.  Wait to schedule.     ENDO screening    1. Have you been admitted for cardiac, kidney or pulmonary causes to the hospital in the past 3 months? no   If yes, schedule an appointment with PCP before scheduling endoscopic procedure.     2. Have you had a stent placed in the last 12 months? no   If yes, for a screening visit, cancel and message the ordering provider.  The patient will need a new order when the time is appropriate.     3. Have you had a stroke or heart attack in the past 6 months? no   If yes, cancel and refer patient to ordering provider for clearance, also message ordering provider to inform.     4.  "Have you had any chest pain in the past 3 months? no   If yes, Have you been evaluated by your PCP and/or cardiologist and it was determined to not be heart related? not applicable   If No, Pt needs to be seen by PCP or Cardiologist .  Pt can be scheduled once clearance obtained by either of those providers.     5. Do you take prescription weight loss medications?  no   If yes, must stop for 2 weeks prior to procedure.     6. Have you been diagnosed with diverticulitis within the past 3 months? no   If yes, must have been seen by GI within the last 3 months, if not schedule with GI VIJAY.    If pt has been seen by GI, schedule procedure 8-12 weeks post antibiotic treatment.     7. Are you on Dialysis? yes  If yes, schedule procedure for the day AFTER dialysis.  Appt time should be 9am or later, patient arrival time is 2 hours prior.  Nulytely or miralax prep for all patients with kidney disease.     8. Are you diabetic?  no   If yes, schedule morning appt. Advise pt to hold all diabetic meds day of procedure.     9. If pt is older than 80 years of age and HAS NOT been seen by GI or PCP within the last 6 months, needs appt with GI VIJAY.   If pt has been seen by the GI provider or PCP within the past 6  months AND meets criteria, schedule procedure AND send message to the endoscopist.     10. Is patient on a "high risk" medication (blood thinner/antiplatelet agent)?  yes   If yes, has cardiac clearance been obtained within the last 60 days? No   If no, a new clearance needs to be obtained.     Final Questions:    1.I have reviewed the last colonoscopy for recommendations regarding next procedure bowel prep.  yes  2. I have reviewed medications and allergies.  yes  3. I have verified the pharmacy information and appropriate prep sent if needed. yes  4. Prep instructions have been mailed or sent to portal per patient request. yes        All schedulers will have ability to reach out to the ordering GI provider to clarify " any issues.

## 2020-08-18 NOTE — PROGRESS NOTES
INR not at goal. Medications, chart, and patient findings reviewed. See calendar for adjustments to dose and follow up plan.  Hold today and lower one dose tomorrow.  Colonoscopy planning for procedure 9/4/20.  Would recommend 5 day hold only, s/p AVR.  NO lovenox due to HD and contraindication, if pt needs bridge therapy, would need admit for IV Heparin.

## 2020-08-18 NOTE — PROGRESS NOTES
Subjective:      Patient ID: Isidro White Jr. is a 50 y.o. male.    Chief Complaint: Colonoscopy    HPI  The patient is here to discuss colon surveillance. He has a history of ESRD and had a colonoscopy last year as part of his transplant evaluation. The prep was fair and he had a polyp, so a six month repeat was recommended. He reports having regular stools twice a day. He denies hematochezia, melena, change in bowel habits, weight loss, change in appetite, abdominal pain, nausea or vomiting. He denies a family history of colon cancer.     Review of Systems  As per HPI.     Objective:     Physical Exam  Constitutional:       Appearance: He is well-developed.   HENT:      Head: Normocephalic and atraumatic.   Neck:      Musculoskeletal: Normal range of motion and neck supple.   Cardiovascular:      Rate and Rhythm: Normal rate and regular rhythm.      Heart sounds: Normal heart sounds. No murmur.   Pulmonary:      Effort: Pulmonary effort is normal. No respiratory distress.      Breath sounds: Normal breath sounds. No wheezing.   Abdominal:      General: Bowel sounds are normal. There is no distension.      Palpations: Abdomen is soft. There is no hepatomegaly or mass.      Tenderness: There is no abdominal tenderness.   Skin:     General: Skin is warm and dry.      Findings: No rash.   Neurological:      Mental Status: He is alert and oriented to person, place, and time.      Cranial Nerves: No cranial nerve deficit.      Gait: Gait normal.         Assessment:     1. History of colon polyps    2. Screening procedure    3. End stage renal disease        Plan:     1. Colonoscopy.   The risks, benefits, alternatives and possible complications of the above procedure(s) were discussed with the patient to include but not limited to infection, bleeding, heart complications, respiratory complications, or perforation which may require surgical intervention. The patient's questions were answered. The patient verbally  reported understanding of the discussion.  2. Further recommendations after above.   3. Follow up with PCP as previously scheduled.     Medications Ordered This Encounter   Medications    polyethylene glycol (GOLYTELY,NULYTELY) 236-22.74-6.74 -5.86 gram suspension     Sig: Take 4,000 mLs (4 L total) by mouth once. Use as directed. for 1 dose     Dispense:  4000 mL     Refill:  0       Orders Placed This Encounter   Procedures    COVID-19 Routine Screening       Follow up if symptoms worsen or fail to improve.    Thank you for the opportunity to participate in the care of this patient.   Lino Skelton PA-C.

## 2020-08-18 NOTE — PROGRESS NOTES
Patient's INR is supra therapeutic at 5.0.  Previous instructions verified taken as instructed.  Denies any bleeding episodes.  Planned colonoscopy scheduled for 9/03/2020.  Advised on bleeding precautions - ED, if needed.  Sent to PharmD for further instructions.

## 2020-08-27 ENCOUNTER — ANTI-COAG VISIT (OUTPATIENT)
Dept: CARDIOLOGY | Facility: CLINIC | Age: 50
End: 2020-08-27
Payer: MEDICARE

## 2020-08-27 DIAGNOSIS — Z79.01 LONG TERM (CURRENT) USE OF ANTICOAGULANTS: Primary | ICD-10-CM

## 2020-08-27 DIAGNOSIS — Z95.2 S/P AVR (AORTIC VALVE REPLACEMENT): ICD-10-CM

## 2020-08-27 LAB — INR PPP: 1.6 (ref 2–3)

## 2020-08-27 PROCEDURE — 93793 ANTICOAG MGMT PT WARFARIN: CPT | Mod: ,,,

## 2020-08-27 PROCEDURE — 85610 PROTHROMBIN TIME: CPT | Mod: PBBFAC

## 2020-08-27 PROCEDURE — 93793 PR ANTICOAGULANT MGMT FOR PT TAKING WARFARIN: ICD-10-PCS | Mod: ,,,

## 2020-08-27 NOTE — PROGRESS NOTES
INR not at goal. Medications, chart, and patient findings reviewed. See calendar for adjustments to dose and follow up plan.  Boost today only, we will have him lower a dose and hold a dose prior to admission for colonoscopy on 9/3 to try to get him at or slightly below 2 for procedure, he will need IV Heparin bridge per Dr Gudino.  If admitting team needs to hold 5 days prior for lower INR for colonoscopy, then he should be admitted sooner than 9/3 (on or before 8/29) and have his warfarin held in the hospital x 5 days.  He should remain in hospital until INR returns to at or near 2.0.  He will need to follow warfarin dosing per discharge MD, we will see him for follow on on 9/8/20.  We have asked the patient to follow up with procedural team and call us if there is any update in admitting plan.

## 2020-08-27 NOTE — PROGRESS NOTES
Patient's INR is subtherapeutic at 1.6.  Previous instructions verified and reported followed.  Colonoscopy is planned for 9/03/2020.  Sent to PharmD for review/dosing.

## 2020-08-28 ENCOUNTER — TELEPHONE (OUTPATIENT)
Dept: CARDIOLOGY | Facility: CLINIC | Age: 50
End: 2020-08-28

## 2020-08-28 NOTE — TELEPHONE ENCOUNTER
Contacted patient regarding plan for upcoming c-scope 9/3/20.  Pt is an HD patient and lovenox is contraindicated.  It was advised that he should be admitted for IV Heparin with coumadin wash out.  This has not been scheduled per chart and per patient.  I have messaged GI to find out INR goal and/or plan for admission. Pt was also told to contact GI for further planning. We have modified his warfarin dose to aim for 2.0 on procedure day.  Will await additional communication from GI.

## 2020-08-31 ENCOUNTER — LAB VISIT (OUTPATIENT)
Dept: OTOLARYNGOLOGY | Facility: CLINIC | Age: 50
End: 2020-08-31
Payer: MEDICARE

## 2020-08-31 DIAGNOSIS — Z13.9 SCREENING PROCEDURE: ICD-10-CM

## 2020-08-31 PROCEDURE — U0003 INFECTIOUS AGENT DETECTION BY NUCLEIC ACID (DNA OR RNA); SEVERE ACUTE RESPIRATORY SYNDROME CORONAVIRUS 2 (SARS-COV-2) (CORONAVIRUS DISEASE [COVID-19]), AMPLIFIED PROBE TECHNIQUE, MAKING USE OF HIGH THROUGHPUT TECHNOLOGIES AS DESCRIBED BY CMS-2020-01-R: HCPCS

## 2020-09-01 ENCOUNTER — TELEPHONE (OUTPATIENT)
Dept: GASTROENTEROLOGY | Facility: CLINIC | Age: 50
End: 2020-09-01

## 2020-09-01 DIAGNOSIS — I35.0 NONRHEUMATIC AORTIC VALVE STENOSIS: Primary | ICD-10-CM

## 2020-09-01 LAB — SARS-COV-2 RNA RESP QL NAA+PROBE: NOT DETECTED

## 2020-09-01 NOTE — TELEPHONE ENCOUNTER
"Spoke to the pt and advised him that is procedure needed to be rescheduled because of coumadin/heparin.  Pt refused to reschedule, stated he had one done with [facility] before and he did heparin injections and he does not know why it is an issue now.     Pt states he is upset and will proceed with the procedure. He had to get transportation set up and his  had to take off work and will proceed with this procedure.     He states everything is in his chart and does not know why this is an issue now. "everything is in my chart and will proceed with procedure".    Advised the pt that the provider will be notified, pt verbalized understanding.       "

## 2020-09-01 NOTE — PROGRESS NOTES
9/1/20- message received from OMARI, MADDY Neves- we will NOT modify his anticoagulation this week.  His colonoscopy will be rescheduled with a definite plan.  I have asked him to return to his prescribed dose and to call GI as he is upset about the procedure needing to be r/s.  He has not heard from that group as of this afternoon.  I will contact him if I hear any other information.

## 2020-09-02 ENCOUNTER — ANTI-COAG VISIT (OUTPATIENT)
Dept: CARDIOLOGY | Facility: CLINIC | Age: 50
End: 2020-09-02
Payer: MEDICARE

## 2020-09-02 ENCOUNTER — LAB VISIT (OUTPATIENT)
Dept: LAB | Facility: HOSPITAL | Age: 50
End: 2020-09-02
Attending: STUDENT IN AN ORGANIZED HEALTH CARE EDUCATION/TRAINING PROGRAM
Payer: MEDICARE

## 2020-09-02 ENCOUNTER — TELEPHONE (OUTPATIENT)
Dept: GASTROENTEROLOGY | Facility: CLINIC | Age: 50
End: 2020-09-02

## 2020-09-02 DIAGNOSIS — I35.0 NONRHEUMATIC AORTIC VALVE STENOSIS: ICD-10-CM

## 2020-09-02 LAB
INR PPP: 1.5 (ref 0.8–1.2)
PROTHROMBIN TIME: 16.1 SEC (ref 9–12.5)

## 2020-09-02 PROCEDURE — 36415 COLL VENOUS BLD VENIPUNCTURE: CPT

## 2020-09-02 PROCEDURE — 85610 PROTHROMBIN TIME: CPT

## 2020-09-02 PROCEDURE — 93793 ANTICOAG MGMT PT WARFARIN: CPT | Mod: ,,,

## 2020-09-02 PROCEDURE — 93793 PR ANTICOAGULANT MGMT FOR PT TAKING WARFARIN: ICD-10-PCS | Mod: ,,,

## 2020-09-02 NOTE — PROGRESS NOTES
"INR not at goal. Medications, chart, and patient findings reviewed. See calendar for adjustments to dose and follow up plan.  INR subtherapeutic at 1.5.  He reports that he was doing what he was told and "got off coumadin."  These were not our directions as I had contacted the patient yesterday 9/1/20 and advised that he NOT stop his coumadin and take dose as planned.   I have made my recommendation for admission today to both cardiology and GI via messaging for low INR and procedure for c-scope planned for tomorrow.  Again the safest plan for valve protection in this hemodialysis patient is to hold warfarin  x 5 days and admit for IV Heparin before and after procedure.  Since he was low today, I have recommended admission today.  GI reports that there are no available beds for overnight.  GI plans to proceed with scope if INR<2.0 tomorrow.  Because he is not off of coumadin and needs anticoagulation for AVR, I will boost him tonight and again post procedure tomorrow as his INR remains low.  I will not hold coumadin tonight due to risk of thrombosis and may still allow procedure as boost is not likely to produce a significant change in INR overnight.  If his INR is >2.0, GI plans to abort the procedure.      "

## 2020-09-02 NOTE — TELEPHONE ENCOUNTER
I have discussed this case with the patient's care team and with Dr. Youngblood. His INR today was 1.5. If he wants to come to endo as planned tomorrow, we will check his INR before the procedure. If it remains around the same, Dr. Youngblood will do his colonoscopy while on Coumadin. If it is around 2 or higher, we would have to cancel and reschedule for another day when admission can be arranged. The other option is to plan to cancel tomorrow and not prep and schedule for another day when admission can be arranged. The patient is NOT cleared to stop his Coumadin.   Lnio Skelton PA-C

## 2020-09-03 ENCOUNTER — ANESTHESIA EVENT (OUTPATIENT)
Dept: ENDOSCOPY | Facility: HOSPITAL | Age: 50
End: 2020-09-03
Payer: MEDICARE

## 2020-09-03 ENCOUNTER — HOSPITAL ENCOUNTER (OUTPATIENT)
Facility: HOSPITAL | Age: 50
Discharge: HOME | End: 2020-09-03
Attending: INTERNAL MEDICINE | Admitting: INTERNAL MEDICINE
Payer: MEDICARE

## 2020-09-03 ENCOUNTER — ANESTHESIA (OUTPATIENT)
Dept: ENDOSCOPY | Facility: HOSPITAL | Age: 50
End: 2020-09-03
Payer: MEDICARE

## 2020-09-03 VITALS
TEMPERATURE: 98 F | BODY MASS INDEX: 29.13 KG/M2 | HEART RATE: 89 BPM | WEIGHT: 226.88 LBS | RESPIRATION RATE: 17 BRPM | OXYGEN SATURATION: 97 % | SYSTOLIC BLOOD PRESSURE: 152 MMHG | DIASTOLIC BLOOD PRESSURE: 92 MMHG

## 2020-09-03 DIAGNOSIS — Z12.11 COLON CANCER SCREENING: ICD-10-CM

## 2020-09-03 DIAGNOSIS — Z86.010 HISTORY OF COLON POLYPS: Primary | ICD-10-CM

## 2020-09-03 LAB
INR PPP: 1.5 (ref 0.8–1.2)
POTASSIUM SERPL-SCNC: 5.5 MMOL/L (ref 3.5–5.1)
PROTHROMBIN TIME: 15.6 SEC (ref 9–12.5)

## 2020-09-03 PROCEDURE — 84132 ASSAY OF SERUM POTASSIUM: CPT

## 2020-09-03 PROCEDURE — 25000003 PHARM REV CODE 250: Performed by: NURSE ANESTHETIST, CERTIFIED REGISTERED

## 2020-09-03 PROCEDURE — 45380 PR COLONOSCOPY,BIOPSY: ICD-10-PCS | Mod: PT,,, | Performed by: INTERNAL MEDICINE

## 2020-09-03 PROCEDURE — 37000008 HC ANESTHESIA 1ST 15 MINUTES: Performed by: INTERNAL MEDICINE

## 2020-09-03 PROCEDURE — 88305 TISSUE EXAM BY PATHOLOGIST: CPT | Performed by: PATHOLOGY

## 2020-09-03 PROCEDURE — 45380 COLONOSCOPY AND BIOPSY: CPT | Mod: PT,,, | Performed by: INTERNAL MEDICINE

## 2020-09-03 PROCEDURE — 85610 PROTHROMBIN TIME: CPT

## 2020-09-03 PROCEDURE — 88305 TISSUE EXAM BY PATHOLOGIST: ICD-10-PCS | Mod: 26,,, | Performed by: PATHOLOGY

## 2020-09-03 PROCEDURE — 88305 TISSUE EXAM BY PATHOLOGIST: CPT | Mod: 26,,, | Performed by: PATHOLOGY

## 2020-09-03 PROCEDURE — 45380 COLONOSCOPY AND BIOPSY: CPT | Performed by: INTERNAL MEDICINE

## 2020-09-03 PROCEDURE — 37000009 HC ANESTHESIA EA ADD 15 MINS: Performed by: INTERNAL MEDICINE

## 2020-09-03 PROCEDURE — 27201012 HC FORCEPS, HOT/COLD, DISP: Performed by: INTERNAL MEDICINE

## 2020-09-03 PROCEDURE — 63600175 PHARM REV CODE 636 W HCPCS: Performed by: NURSE ANESTHETIST, CERTIFIED REGISTERED

## 2020-09-03 RX ORDER — SODIUM CHLORIDE, SODIUM LACTATE, POTASSIUM CHLORIDE, CALCIUM CHLORIDE 600; 310; 30; 20 MG/100ML; MG/100ML; MG/100ML; MG/100ML
INJECTION, SOLUTION INTRAVENOUS CONTINUOUS PRN
Status: DISCONTINUED | OUTPATIENT
Start: 2020-09-03 | End: 2020-09-03

## 2020-09-03 RX ORDER — LIDOCAINE HYDROCHLORIDE 10 MG/ML
INJECTION, SOLUTION EPIDURAL; INFILTRATION; INTRACAUDAL; PERINEURAL
Status: DISCONTINUED | OUTPATIENT
Start: 2020-09-03 | End: 2020-09-03

## 2020-09-03 RX ORDER — SODIUM CHLORIDE 0.9 % (FLUSH) 0.9 %
10 SYRINGE (ML) INJECTION
Status: DISCONTINUED | OUTPATIENT
Start: 2020-09-03 | End: 2020-09-03 | Stop reason: HOSPADM

## 2020-09-03 RX ORDER — PROPOFOL 10 MG/ML
VIAL (ML) INTRAVENOUS
Status: DISCONTINUED | OUTPATIENT
Start: 2020-09-03 | End: 2020-09-03

## 2020-09-03 RX ADMIN — PROPOFOL 50 MG: 10 INJECTION, EMULSION INTRAVENOUS at 09:09

## 2020-09-03 RX ADMIN — PROPOFOL 30 MG: 10 INJECTION, EMULSION INTRAVENOUS at 10:09

## 2020-09-03 RX ADMIN — PROPOFOL 50 MG: 10 INJECTION, EMULSION INTRAVENOUS at 10:09

## 2020-09-03 RX ADMIN — LIDOCAINE HYDROCHLORIDE 50 MG: 10 INJECTION, SOLUTION EPIDURAL; INFILTRATION; INTRACAUDAL; PERINEURAL at 09:09

## 2020-09-03 RX ADMIN — PROPOFOL 30 MG: 10 INJECTION, EMULSION INTRAVENOUS at 09:09

## 2020-09-03 RX ADMIN — PROPOFOL 40 MG: 10 INJECTION, EMULSION INTRAVENOUS at 10:09

## 2020-09-03 RX ADMIN — SODIUM CHLORIDE, SODIUM LACTATE, POTASSIUM CHLORIDE, AND CALCIUM CHLORIDE: 600; 310; 30; 20 INJECTION, SOLUTION INTRAVENOUS at 09:09

## 2020-09-03 NOTE — ANESTHESIA POSTPROCEDURE EVALUATION
Anesthesia Post Evaluation    Patient: Isidro White JrKatya    Procedure(s) Performed: Procedure(s) (LRB):  COLONOSCOPY-need INR (N/A)    Final Anesthesia Type: MAC    Patient location during evaluation: GI PACU  Patient participation: Yes- Able to Participate  Level of consciousness: awake and alert and oriented  Post-procedure vital signs: reviewed and stable  Pain management: adequate  Airway patency: patent  DANIEL mitigation strategies: Multimodal analgesia  PONV status at discharge: No PONV  Anesthetic complications: no      Cardiovascular status: hemodynamically stable  Respiratory status: unassisted, spontaneous ventilation and room air  Hydration status: euvolemic  Follow-up not needed.          Vitals Value Taken Time   /81 09/03/20 1014   Temp 36.6 °C (97.9 °F) 09/03/20 1014   Pulse 89 09/03/20 1014   Resp 18 09/03/20 1014   SpO2 96 % 09/03/20 1014         No case tracking events are documented in the log.      Pain/Dru Score: No data recorded

## 2020-09-03 NOTE — H&P
Short Stay Endoscopy History and Physical    PCP - Miguel Campo MD (Inactive)    Procedure - Colonoscopy    H/O colon polyp(s), denies any acute issues. Needs surveillance colonoscopy.    ROS:  Constitutional: No fevers, chills, No weight loss  ENT: No allergies  CV: No chest pain  Pulm: No cough, No shortness of breath  Ophtho: No vision changes  GI: see HPI  Derm: No rash  Heme: No lymphadenopathy, No bruising  MSK: No arthritis  : No dysuria, No hematuria  Endo: No hot or cold intolerance  Neuro: No syncope, No seizure  Psych: No anxiety, No depression    Medical History:  has a past medical history of Aortic valve stenosis, Atrial fibrillation, Atrial flutter, Cardiomyopathy, CHF (congestive heart failure), Drug-induced erectile dysfunction, ESRD due to hypertension, ESRD on dialysis, Hepatitis B, Hyperlipidemia, Hypertension, DANIEL (obstructive sleep apnea) (11/12/2019), Secondary hyperparathyroidism of renal origin, Supraventricular tachycardia, Tachycardia, and Valvular regurgitation.    Surgical History:  has a past surgical history that includes ASD repair; Cardiac valuve replacement (02/08/2017); Radiofrequency ablation (03/13/2017); Cardiac catheterization; AV Graft Creation (Left, 03/2017); Colonoscopy (N/A, 8/27/2019); Esophagogastroduodenoscopy (N/A, 8/27/2019); and Removal of graft (Left, 7/2/2020).    Family History: family history includes Anesthesia problems in his paternal uncle; Diabetes in his paternal aunt and paternal aunt; Heart attack in his father; Heart failure in his paternal grandmother; Hypertension in his paternal aunt; Leukemia in his mother and paternal aunt; No Known Problems in his brother, sister, sister, and sister; Stroke in his paternal aunt; Suicide in his paternal uncle; Valvular heart disease in his maternal aunt.. Otherwise no colon cancer, inflammatory bowel disease, or GI malignancies.    Social History:  reports that he has never smoked. He has never used  smokeless tobacco. He reports that he does not drink alcohol or use drugs.    Review of patient's allergies indicates:  No Known Allergies    Medications:   Medications Prior to Admission   Medication Sig Dispense Refill Last Dose    amLODIPine (NORVASC) 10 MG tablet take 1 tablet by mouth every day 30 tablet 10 9/2/2020 at Unknown time    ascorbic acid, vitamin C, (VITAMIN C) 500 MG tablet Take 1 tablet (500 mg total) by mouth 2 (two) times daily.   9/2/2020 at Unknown time    b complex vitamins tablet Take 1 tablet by mouth once daily.   9/2/2020 at Unknown time    calcium acetate,phosphat bind, (PHOSLO) 667 mg capsule Take 2 capsules (1,334 mg total) by mouth 3 (three) times daily. 180 capsule 0 9/2/2020 at Unknown time    diltiaZEM (CARDIZEM CD) 360 MG 24 hr capsule Take 1 capsule (360 mg total) by mouth once daily. 90 capsule 1 9/2/2020 at Unknown time    epoetin vikki (PROCRIT) 10,000 unit/mL injection Inject 1 mL (10,000 Units total) into the skin every Mon, Wed, Fri. To be given with HD   9/2/2020 at Unknown time    fish oil-omega-3 fatty acids 300-1,000 mg capsule Take 2 g by mouth once daily.   9/2/2020 at Unknown time    hydrALAZINE (APRESOLINE) 100 MG tablet TAKE ONE TABLET BY MOUTH THREE TIMES DAILY INCLUDING DIALYSIS DAYS 90 tablet 5 9/2/2020 at Unknown time    hydrOXYzine pamoate (VISTARIL) 25 MG Cap take 1 capsule by mouth daily as needed for itching 30 capsule 5 9/2/2020 at Unknown time    labetalol (NORMODYNE) 100 MG tablet TAKE ONE TABLET BY MOUTH THREE TIMES DAILY AS NEEDED FOR HEART RATE > 120 90 tablet 3 9/2/2020 at Unknown time    labetaloL (NORMODYNE) 200 MG tablet Take 1 tablet (200 mg total) by mouth 3 (three) times daily. 90 tablet 3 9/2/2020 at Unknown time    lisinopril (PRINIVIL,ZESTRIL) 40 MG tablet TAKE ONE TABLET BY MOUTH EVERY DAY 30 tablet 9 9/2/2020 at Unknown time    multivitamin with minerals tablet Take 1 tablet by mouth once daily.   9/2/2020 at Unknown time     omega-3 fatty acids-vitamin E (FISH OIL) 1,000 mg Cap Take 1 capsule by mouth once daily.  0 9/2/2020 at Unknown time    pantoprazole (PROTONIX) 40 MG tablet TAKE ONE TABLET BY MOUTH EVERY DAY 30 tablet 8 9/2/2020 at Unknown time    ETHAN-RACQUEL RX 1- mg-mg-mcg Tab TAKE ONE TABLET BY MOUTH EVERY DAY 90 tablet 1 9/2/2020 at Unknown time    tenofovir (VIREAD) 300 mg Tab Take 1 tablet (300 mg total) by mouth once a week. 12 tablet 0 9/2/2020 at Unknown time    vit b cmplx 3-fa-vit c-biotin 1- mg-mg-mcg (ETHAN-RACQUEL RX) 1- mg-mg-mcg Tab Take 1 tablet by mouth once daily. 90 tablet 2 9/2/2020 at Unknown time    vitamin D (VITAMIN D3) 1000 units Tab Take 1,000 Units by mouth 3 (three) times a week.   9/2/2020 at Unknown time    warfarin (COUMADIN) 7.5 MG tablet Take 2 tablets by mouth on Thursdays and 1 tablet on all other days of the week. (Patient taking differently: Take 2 tablets by mouth on Thursdays and Saturday; and 1 tablet on all other days of the week.) 35 tablet 2 9/2/2020 at Unknown time    albuterol (VENTOLIN HFA) 90 mcg/actuation inhaler Inhale 2 puffs into the lungs every 6 (six) hours as needed for Wheezing or Shortness of Breath. Rescue 18 g 3     HYDROcodone-acetaminophen (NORCO) 7.5-325 mg per tablet Take 1 tablet by mouth every 4 (four) hours as needed for pain (Patient not taking: Reported on 8/18/2020) 15 tablet 0     polyethylene glycol (CLEARLAX) 17 gram PwPk Take 17 g by mouth once daily (Patient not taking: Reported on 8/18/2020) 30 packet 30     tadalafiL (CIALIS) 10 MG tablet Take 1 tablet (10 mg total) by mouth once daily as needed for sexual activity 10 tablet 5 Unknown at Unknown time    tadalafiL (CIALIS) 20 MG Tab Take one tablet po QOD prn sexual activity. 10 tablet 11 Unknown at Unknown time       Objective Findings:    Vital Signs:   Vitals:    09/03/20 0830   BP: (!) 162/106   Pulse: 96   Resp: 18   Temp: 98.1 °F (36.7 °C)         Physical Exam:  General  Appearance: Well appearing in no acute distress  Eyes:    No scleral icterus  ENT: Neck supple, Lips, mucosa, and tongue normal; teeth and gums normal  Lungs: CTA bilaterally in anterior and posterior fields, no wheezes, no crackles.  Heart:  Regular rate, S1, S2 normal, no murmurs heard.  Abdomen: Soft, non tender, non distended with normal bowel sounds. No hepatosplenomegaly, ascites, or mass.  Extremities: No clubbing, cyanosis or edema  Skin: No rash    Labs:  Lab Results   Component Value Date    WBC 8.06 07/06/2020    HGB 8.0 (L) 07/06/2020    HCT 24.1 (L) 07/06/2020     (L) 07/06/2020    CHOL 213 (H) 01/08/2019    TRIG 103 01/08/2019    HDL 50 01/08/2019    ALT 18 07/01/2020    AST 17 07/01/2020     07/06/2020    K 5.5 (H) 09/03/2020     07/06/2020    CREATININE 19.4 (H) 07/06/2020    BUN 97 (H) 07/06/2020    CO2 19 (L) 07/06/2020    PSA 0.62 01/08/2019    INR 1.5 (H) 09/03/2020    HGBA1C 4.8 09/20/2019       I have explained the risks and benefits of endoscopy procedures to the patient including but not limited to bleeding, perforation, infection, and death.    Proceed with colonoscopy for h/o colon polyp(s).

## 2020-09-03 NOTE — ANESTHESIA RELEASE NOTE
Anesthesia Release from PACU Note    Patient: Isidro White Jr.    Procedure(s) Performed: Procedure(s) (LRB):  COLONOSCOPY-need INR (N/A)    Anesthesia type: MAC    Post pain: Adequate analgesia    Post assessment: no apparent anesthetic complications and tolerated procedure well    Last Vitals:   Visit Vitals  /81 (BP Location: Left arm, Patient Position: Lying)   Pulse 89   Temp 36.6 °C (97.9 °F) (Temporal)   Resp 18   Wt 102.9 kg (226 lb 13.7 oz)   SpO2 96%   BMI 29.13 kg/m²       Post vital signs: stable    Level of consciousness: awake, alert  and oriented    Nausea/Vomiting: no nausea/no vomiting    Complications: none    Airway Patency: patent    Respiratory: unassisted, spontaneous ventilation, room air    Cardiovascular: stable and blood pressure at baseline    Hydration: euvolemic

## 2020-09-03 NOTE — ANESTHESIA PREPROCEDURE EVALUATION
09/03/2020  Isidro White Jr. is a 50 y.o., male.    Anesthesia Evaluation    I have reviewed the Patient Summary Reports.    I have reviewed the Nursing Notes. I have reviewed the NPO Status.   I have reviewed the Medications.     Review of Systems  Anesthesia Hx:  No problems with previous Anesthesia    Social:  Non-Smoker, No Alcohol Use    Hematology/Oncology:     Oncology Normal    -- Anemia:   EENT/Dental:EENT/Dental Normal   Cardiovascular:   Hypertension, well controlled Valvular problems/Murmurs (s/p AVR) CHF (chronic diastolic heart failure) ECG has been reviewed. · Mild concentric left ventricular hypertrophy.  · Moderate left atrial enlargement.  · Normal left ventricular systolic function. The estimated ejection fraction is 60%.  · Grade II (moderate) left ventricular diastolic dysfunction consistent with pseudonormalization.  · Normal right ventricular systolic function.  · There is a mechanical aortic valve present. Mean gradient is 38 mmHg.  · Mild mitral regurgitation.  · Mild tricuspid regurgitation.  · Normal central venous pressure (3 mmHg).  · The estimated PA systolic pressure is 42 mmHg.  · Pulmonary hypertension present.  · Mild mitral stenosis.  · Mild right ventricular enlargement.      Pulmonary:   COPD, mild Sleep Apnea    Education provided regarding risk of obstructive sleep apnea     Renal/:   Chronic Renal Disease, ESRD, Dialysis M,W,F dialysis.    Hepatic/GI:   Liver Disease, Hepatitis, B    Musculoskeletal:  Musculoskeletal Normal    Neurological:  Neurology Normal    Endocrine:   Hyperparathyroid disease   Dermatological:  Skin Normal        Physical Exam  General:  Well nourished    Airway/Jaw/Neck:  Airway Findings: Mouth Opening: Normal Tongue: Normal  General Airway Assessment: Adult  Mallampati: II  TM Distance: 4 - 6 cm  Jaw/Neck Findings:  Neck ROM: Normal ROM       Dental:  Dental Findings: In tact    Chest/Lungs:  Chest/Lungs Findings: Clear to auscultation, Normal Respiratory Rate     Heart/Vascular:  Heart Findings: Rate: Normal  Rhythm: Regular Rhythm  Sounds: Normal        Mental Status:  Mental Status Findings:  Cooperative, Alert and Oriented         Anesthesia Plan  Type of Anesthesia, risks & benefits discussed:  Anesthesia Type:  MAC  Patient's Preference:   Intra-op Monitoring Plan: standard ASA monitors  Intra-op Monitoring Plan Comments:   Post Op Pain Control Plan: per primary service following discharge from PACU  Post Op Pain Control Plan Comments:   Induction:   IV  Beta Blocker:  Patient is not currently on a Beta-Blocker (No further documentation required).       Informed Consent: Patient understands risks and agrees with Anesthesia plan.  Questions answered. Anesthesia consent signed with patient.  ASA Score: 4     Day of Surgery Review of History & Physical:  There are no significant changes.          Ready For Surgery From Anesthesia Perspective.

## 2020-09-03 NOTE — PLAN OF CARE
Dr. Youngblood  At bedside; findings discussed with patient and family. Vital signs stable. Per Dr Youngblood OK to resume Coumadin tonight; Dr Youngblood instructed patient to resume Coumadin tonight and patient verbalized understanding

## 2020-09-03 NOTE — [employer] PATIENT INSTRUCTIONS
Discharge Summary/Instructions after an Endoscopic Procedure  Patient Name: Isidro White  Patient MRN: [id number]  Patient YOB: 1970  Thursday, September 3, 2020 Jemma Youngblood MD  RESTRICTIONS:  During your procedure today, you received medications for sedation.  These   medications may affect your judgment, balance and coordination.  Therefore,   for 24 hours, you have the following restrictions:   - DO NOT drive a car, operate machinery, make legal/financial decisions,   sign important papers or drink alcohol.    ACTIVITY:  Today: no heavy lifting, straining or running due to procedural   sedation/anesthesia.  The following day: return to full activity including work.  DIET:  Eat and drink normally unless instructed otherwise.     TREATMENT FOR COMMON SIDE EFFECTS:  - Mild abdominal pain, nausea, belching, bloating or excessive gas:  rest,   eat lightly and use a heating pad.  - Sore Throat: treat with throat lozenges and/or gargle with warm salt   water.  - Because air was used during the procedure, expelling large amounts of air   from your rectum or belching is normal.  - If a bowel prep was taken, you may not have a bowel movement for 1-3 days.    This is normal.  SYMPTOMS TO WATCH FOR AND REPORT TO YOUR PHYSICIAN:  1. Abdominal pain or bloating, other than gas cramps.  2. Chest pain.  3. Back pain.  4. Signs of infection such as: chills or fever occurring within 24 hours   after the procedure.  5. Rectal bleeding, which would show as bright red, maroon, or black stools.   (A tablespoon of blood from the rectum is not serious, especially if   hemorrhoids are present.)  6. Vomiting.  7. Weakness or dizziness.  GO DIRECTLY TO THE NEAREST EMERGENCY ROOM IF YOU HAVE ANY OF THE FOLLOWING:      Difficulty breathing              Chills and/or fever over 101 F   Persistent vomiting and/or vomiting blood   Severe abdominal pain   Severe chest pain   Black, tarry stools   Bleeding- more than one  tablespoon   Any other symptom or condition that you feel may need urgent attention  Your doctor recommends these additional instructions:  If any biopsies were taken, your doctors clinic will contact you in 1 to 2   weeks with any results.  - Repeat colonoscopy in 5 years for surveillance based on pathology results.     - Resume previous diet.   - Continue present medications.   - Resume Coumadin (warfarin) at prior dose tomorrow, if possible given polyp   biopsy.   - Await pathology results.   - Discharge patient to home.  For questions, problems or results please call your physician Jemma Youngblood MD at Work:  [phone]  If you have any questions about the above instructions, call the GI   department at [phone] or call the endoscopy unit at [phone]   from 7am until 3 pm.  [facility] [place], EMERGENCY ROOM PHONE NUMBER:   [phone]  IF A COMPLICATION OR EMERGENCY SITUATION ARISES AND YOU ARE UNABLE TO REACH   YOUR PHYSICIAN - GO DIRECTLY TO THE EMERGENCY ROOM.  I have read or have had read to me these discharge instructions for my   procedure and have received a written copy.  I understand these   instructions and will follow-up with my physician if I have any questions.     __________________________________       _____________________________________  Nurse Signature                                          Patient/Designated   Responsible Party Signature  Jemma Youngblood MD  9/3/2020 10:21:13 AM  This report has been verified and signed electronically.  [employer]

## 2020-09-03 NOTE — PLAN OF CARE
Problem: Patient Care Overview  Goal: Plan of Care Review  Outcome: Ongoing (interventions implemented as appropriate)  No fal related injury. Hemodialysis today, bp improved, continue to monitor trlr ,vs q4 and prn. Coumadin on hold, bridging with heparin, monitor anti Xa per protocol       They only have 120 qty available to dispense.  They need a new Rx for 60 tabs.    Thank you  Arlet

## 2020-09-03 NOTE — TRANSFER OF CARE
Anesthesia Transfer of Care Note    Patient: Isidro White Jr.    Procedure(s) Performed: Procedure(s) (LRB):  COLONOSCOPY-need INR (N/A)    Patient location: GI    Anesthesia Type: MAC    Transport from OR: Transported from OR on room air with adequate spontaneous ventilation    Post pain: adequate analgesia    Post assessment: no apparent anesthetic complications and tolerated procedure well    Post vital signs: stable    Level of consciousness: responds to stimulation    Nausea/Vomiting: no nausea/vomiting    Complications: none    Transfer of care protocol was followed      Last vitals:   Visit Vitals  BP (!) 162/106 (BP Location: Left arm, Patient Position: Lying)   Pulse 96   Temp 36.7 °C (98.1 °F) (Temporal)   Resp 18   Wt 102.9 kg (226 lb 13.7 oz)   SpO2 97%   BMI 29.13 kg/m²

## 2020-09-04 ENCOUNTER — TELEPHONE (OUTPATIENT)
Dept: TRANSPLANT | Facility: CLINIC | Age: 50
End: 2020-09-04

## 2020-09-04 NOTE — TELEPHONE ENCOUNTER
Isidro called to let me know his colonoscopy was done and what else did he need to do for re referral. I advised that was it and he asked me to call the dialysis unit and let them know which I did. They will re refer.    ----- Message from Consuelo Hewitt sent at 9/4/2020  3:49 PM CDT -----    ----- Message -----  From: Crys Anderson  Sent: 9/4/2020   3:35 PM CDT  To: [facility] Clinical    He wants to speak with Analilia regarding colonoscopy       Pt

## 2020-09-08 ENCOUNTER — ANTI-COAG VISIT (OUTPATIENT)
Dept: CARDIOLOGY | Facility: CLINIC | Age: 50
End: 2020-09-08
Payer: MEDICARE

## 2020-09-08 DIAGNOSIS — Z79.01 LONG TERM (CURRENT) USE OF ANTICOAGULANTS: Primary | ICD-10-CM

## 2020-09-08 DIAGNOSIS — Z79.01 LONG TERM (CURRENT) USE OF ANTICOAGULANTS: ICD-10-CM

## 2020-09-08 DIAGNOSIS — I48.91 ATRIAL FIBRILLATION, UNSPECIFIED TYPE: ICD-10-CM

## 2020-09-08 DIAGNOSIS — Z95.2 S/P AVR (AORTIC VALVE REPLACEMENT): ICD-10-CM

## 2020-09-08 LAB — INR PPP: 3.3 (ref 2–3)

## 2020-09-08 PROCEDURE — 93793 PR ANTICOAGULANT MGMT FOR PT TAKING WARFARIN: ICD-10-PCS | Mod: ,,,

## 2020-09-08 PROCEDURE — 93793 ANTICOAG MGMT PT WARFARIN: CPT | Mod: ,,,

## 2020-09-08 PROCEDURE — 85610 PROTHROMBIN TIME: CPT | Mod: PBBFAC

## 2020-09-08 RX ORDER — HYDRALAZINE HYDROCHLORIDE 100 MG/1
TABLET, FILM COATED ORAL
Qty: 90 TABLET | Refills: 5 | Status: SHIPPED | OUTPATIENT
Start: 2020-09-08 | End: 2021-05-11 | Stop reason: SDUPTHER

## 2020-09-08 RX ORDER — WARFARIN 7.5 MG/1
TABLET ORAL
Qty: 35 TABLET | Refills: 2 | Status: SHIPPED | OUTPATIENT
Start: 2020-09-08 | End: 2020-09-08 | Stop reason: SDUPTHER

## 2020-09-08 RX ORDER — WARFARIN 7.5 MG/1
TABLET ORAL
Qty: 35 TABLET | Refills: 2 | Status: SHIPPED | OUTPATIENT
Start: 2020-09-08 | End: 2021-01-04 | Stop reason: SDUPTHER

## 2020-09-08 RX ORDER — LISINOPRIL 40 MG/1
40 TABLET ORAL DAILY
Qty: 30 TABLET | Refills: 9 | OUTPATIENT
Start: 2020-09-08

## 2020-09-08 NOTE — PROGRESS NOTES
INR not at goal. Medications, chart, and patient findings reviewed. See calendar for adjustments to dose and follow up plan.  Lower dose x 1.  Recheck next week.  New rx for warfarin sent today.

## 2020-09-08 NOTE — PROGRESS NOTES
Patient's INR is supratherapeutic at 3.3 post colonoscopy on 9/03/2020.  See dose calendar for post procedure warfarin dose.  No bleeding episodes reported.  Bleeding precautions given - ED, if needed.  Patient requesting for a refill with an increase in quantity.  Patient states, he has another procedure scheduled for 9/23/2020 at the [facility] - left arm shunt graft.  Sent to PharmD for review and further dosing.

## 2020-09-09 LAB
FINAL PATHOLOGIC DIAGNOSIS: NORMAL
GROSS: NORMAL

## 2020-09-10 NOTE — PROGRESS NOTES
Patient:   PEREZ WADDELL            MRN: [id number]            FIN: [id number]              Age:   73 years     Sex:  FEMALE     :  48   Associated Diagnoses:   None   Author:   CUBA JACKSON     Procedure: L ECHO chest, L Chest tube placement  Indication: Abnormal Chest X-Ray, Moderate Left Ptx  Medications:  Lidocaine 1% instilled into the chest wall 15 ml Sub-Q,  Morphine 1 mg IV  Complications:  No immediate complications  Procedure:       - The patient’s medications, allergies and sensitivities have been reviewed  - The risks and benefits of the procedure and the sedation options and risks were discussed with the patient. All questions were answered and informed consent was obtained  - Only local anesthesia, not conscious sedation , was planned for the procedure  - The anesthesia plan was to use minimal sedation/analgesia (anxiolysis)  - The heart rate, respiratory rate, Oxygen saturation, blood pressure, adequacy of pulmonary ventilation and response to care were monitored throughout the procedure .  - The procedure was accomplished without difficulty. The patient tolerated the procedure well  Fundings:  1- Ultrasound of the Left Hemithorax:  Patient was placed in the Left side up position on the procedure table. Ultrasound evaluation of the Left hemithorax was performed and revealed no lung sliding and no B-lines  2- Tube Thoracotomy with Ultrasound guidance   The patient was placed in the  Left side up position on the procedure table  The patient was prepped and draped in the usual sterile manner and the Left chest wall at the midclavicular line in the 2ND intercostal space was anesthetized . An 18-gauge needle was then passed between the ribs and advanced into the pleural space with ultrasound guidance. Two ml of air  was aspirated. A J-tipped guide wire was then passed through the needle and into the pleural space. A#11 scalpel was then used to make a .5 cm incision in the  skin  JOCELIN Viramontes aware MAP 88-90 on 15mcg of cardene and after PRN hydralazine and daily antihypertensives. MD aware of all other VS, UOP, and gtts. New order received for 10mg labetolol. Will carryout order and continue to monitor pt closely   adjacent to the guide wire.  Sequential dilation was then performed using  a dilators passed over the guide wire and advanced into the pleural space. Following dilation, the 14 Fr (Cirilo) chest tube was advanced over the guide-wire and into the pleural space. The tube was positioned at 8 cm at the patient’s skin. The guide wire was then removed.  The tube was then secured in place with 2 sutures of 0-silk. The site was then covered and dressed.  The tube was connected to a pleur-evac set to -20cm suction  Impression                    - Successful Chest tube placement   Recommendation:      - Chest X-ray post procedure                                            - Await Culture and lab results

## 2020-09-15 ENCOUNTER — ANTI-COAG VISIT (OUTPATIENT)
Dept: CARDIOLOGY | Facility: CLINIC | Age: 50
End: 2020-09-15
Payer: MEDICARE

## 2020-09-15 DIAGNOSIS — Z95.2 S/P AVR (AORTIC VALVE REPLACEMENT): ICD-10-CM

## 2020-09-15 DIAGNOSIS — I48.91 ATRIAL FIBRILLATION, UNSPECIFIED TYPE: ICD-10-CM

## 2020-09-15 DIAGNOSIS — Z79.01 LONG TERM (CURRENT) USE OF ANTICOAGULANTS: Primary | ICD-10-CM

## 2020-09-15 LAB — INR PPP: 3.2 (ref 2–3)

## 2020-09-15 PROCEDURE — 93793 PR ANTICOAGULANT MGMT FOR PT TAKING WARFARIN: ICD-10-PCS | Mod: ,,,

## 2020-09-15 PROCEDURE — 85610 PROTHROMBIN TIME: CPT | Mod: PBBFAC

## 2020-09-15 PROCEDURE — 93793 ANTICOAG MGMT PT WARFARIN: CPT | Mod: ,,,

## 2020-09-15 NOTE — PROGRESS NOTES
INR not at goal. Medications, chart, and patient findings reviewed. See calendar for adjustments to dose and follow up plan.  We will lower over dose permanently today as INR did improve.

## 2020-09-15 NOTE — PROGRESS NOTES
Patient's INR is slightly supratherapeutic at 3.2.  Previous dose instructions were verified and followed.  No other changes reported.  No bleeding issues reported.  Advised on bleeding precautions - ED, if needed.  Sent to PharmD for review and further dosing.

## 2020-09-21 DIAGNOSIS — Z76.82 ORGAN TRANSPLANT CANDIDATE: Primary | ICD-10-CM

## 2020-09-24 ENCOUNTER — TELEPHONE (OUTPATIENT)
Dept: TRANSPLANT | Facility: CLINIC | Age: 50
End: 2020-09-24

## 2020-09-29 ENCOUNTER — ANTI-COAG VISIT (OUTPATIENT)
Dept: CARDIOLOGY | Facility: CLINIC | Age: 50
End: 2020-09-29
Payer: MEDICARE

## 2020-09-29 DIAGNOSIS — I48.91 ATRIAL FIBRILLATION, UNSPECIFIED TYPE: ICD-10-CM

## 2020-09-29 DIAGNOSIS — Z79.01 LONG TERM (CURRENT) USE OF ANTICOAGULANTS: Primary | ICD-10-CM

## 2020-09-29 DIAGNOSIS — Z95.2 S/P AVR (AORTIC VALVE REPLACEMENT): ICD-10-CM

## 2020-09-29 LAB — INR PPP: 2.3 (ref 2–3)

## 2020-09-29 PROCEDURE — 93793 ANTICOAG MGMT PT WARFARIN: CPT | Mod: ,,,

## 2020-09-29 PROCEDURE — 93793 PR ANTICOAGULANT MGMT FOR PT TAKING WARFARIN: ICD-10-PCS | Mod: ,,,

## 2020-09-29 PROCEDURE — 85610 PROTHROMBIN TIME: CPT | Mod: PBBFAC

## 2020-09-29 NOTE — PROGRESS NOTES
Patient's INR is therapeutic at 2.3.  Current warfarin regimen has been verified.  Reports an upcoming graft procedure will be planned with the [facility] (Dr. Sidhu) - no date as of yet.  Instructions given to maintain current dose of warfarin 15 mg every Thursday, Saturday; and 7.5 mg on all other days per week.  Advised patient to contact the Coumadin Clinic, once procedure has been scheduled to receive pre/post warfarin instructions.  Recheck on 10/13/2020 - pending procedure.  Patient verbalized understanding of all medical information given.

## 2020-10-02 ENCOUNTER — TELEPHONE (OUTPATIENT)
Dept: HEPATOLOGY | Facility: CLINIC | Age: 50
End: 2020-10-02

## 2020-10-02 DIAGNOSIS — B18.1 CHRONIC VIRAL HEPATITIS B WITHOUT DELTA AGENT AND WITHOUT COMA: ICD-10-CM

## 2020-10-02 RX ORDER — LISINOPRIL 40 MG/1
40 TABLET ORAL DAILY
Qty: 30 TABLET | Refills: 9 | Status: CANCELLED | OUTPATIENT
Start: 2020-10-02

## 2020-10-02 RX ORDER — CALCIUM ACETATE 667 MG/1
1334 CAPSULE ORAL 3 TIMES DAILY
Qty: 180 CAPSULE | Refills: 0 | Status: CANCELLED | OUTPATIENT
Start: 2020-10-02 | End: 2020-11-01

## 2020-10-02 RX ORDER — LISINOPRIL 40 MG/1
40 TABLET ORAL DAILY
Qty: 30 TABLET | Refills: 9 | OUTPATIENT
Start: 2020-10-02

## 2020-10-02 RX ORDER — TENOFOVIR DISOPROXIL FUMARATE 300 MG/1
300 TABLET, FILM COATED ORAL WEEKLY
Qty: 12 TABLET | Refills: 0 | OUTPATIENT
Start: 2020-10-02

## 2020-10-02 RX ORDER — CALCIUM ACETATE 667 MG/1
1334 CAPSULE ORAL 3 TIMES DAILY
Qty: 180 CAPSULE | Refills: 0 | Status: SHIPPED | OUTPATIENT
Start: 2020-10-02 | End: 2021-02-05 | Stop reason: SDUPTHER

## 2020-10-02 NOTE — TELEPHONE ENCOUNTER
----- Message from Kelly Guerrero NP sent at 10/2/2020  9:40 AM CDT -----  Maximo Carroll,    Please call patient to determine where he plans to receive follow up care.  Last note stated that he could not come to [place] anymore.   Appt was made with [facility] in [place] in 7/2020, but it looks like patient cancelled appt.  We can't continue to authorize refills of Vemlidy unless he comes back to the clinic for a return visit.    Thanks,  Kelly

## 2020-10-06 ENCOUNTER — TELEPHONE (OUTPATIENT)
Dept: GASTROENTEROLOGY | Facility: CLINIC | Age: 50
End: 2020-10-06

## 2020-10-06 NOTE — TELEPHONE ENCOUNTER
----- Message from VERENA Sutton sent at 10/6/2020  8:43 AM CDT -----    ----- Message -----  From: Tom White  Sent: 10/5/2020   4:29 PM CDT  To: VERENA Sutton    Pt would like to schedule an appt for liver problem.  Please call back at [phone]. Md Denita

## 2020-10-06 NOTE — TELEPHONE ENCOUNTER
Returned call to patient. He informed me he did not want to go to [place] for medication. I informed him he would need to see a Hepatology provider in order to get his Vemlidy. He verbalized understanding. Patient is scheduled for 10/13 at 10am. He accepted appointment.

## 2020-10-13 ENCOUNTER — OFFICE VISIT (OUTPATIENT)
Dept: GASTROENTEROLOGY | Facility: CLINIC | Age: 50
End: 2020-10-13
Payer: MEDICARE

## 2020-10-13 ENCOUNTER — ANTI-COAG VISIT (OUTPATIENT)
Dept: CARDIOLOGY | Facility: CLINIC | Age: 50
End: 2020-10-13
Payer: MEDICARE

## 2020-10-13 VITALS
HEIGHT: 74 IN | SYSTOLIC BLOOD PRESSURE: 120 MMHG | WEIGHT: 224.19 LBS | BODY MASS INDEX: 28.77 KG/M2 | DIASTOLIC BLOOD PRESSURE: 62 MMHG | HEART RATE: 90 BPM

## 2020-10-13 DIAGNOSIS — B18.1 CHRONIC HEPATITIS B: ICD-10-CM

## 2020-10-13 DIAGNOSIS — B18.1 CHRONIC VIRAL HEPATITIS B WITHOUT DELTA AGENT AND WITHOUT COMA: Primary | ICD-10-CM

## 2020-10-13 DIAGNOSIS — Z79.01 ANTICOAGULATED: ICD-10-CM

## 2020-10-13 DIAGNOSIS — N18.6 END STAGE RENAL DISEASE: ICD-10-CM

## 2020-10-13 DIAGNOSIS — Z95.2 S/P AVR (AORTIC VALVE REPLACEMENT): ICD-10-CM

## 2020-10-13 DIAGNOSIS — Z79.01 LONG TERM (CURRENT) USE OF ANTICOAGULANTS: Primary | ICD-10-CM

## 2020-10-13 LAB — INR PPP: 1.6 (ref 2–3)

## 2020-10-13 PROCEDURE — 99999 PR PBB SHADOW E&M-EST. PATIENT-LVL III: ICD-10-PCS | Mod: PBBFAC,TXP,, | Performed by: NURSE PRACTITIONER

## 2020-10-13 PROCEDURE — 99214 PR OFFICE/OUTPT VISIT, EST, LEVL IV, 30-39 MIN: ICD-10-PCS | Mod: S$PBB,NTX,, | Performed by: NURSE PRACTITIONER

## 2020-10-13 PROCEDURE — 93793 PR ANTICOAGULANT MGMT FOR PT TAKING WARFARIN: ICD-10-PCS | Mod: ,,,

## 2020-10-13 PROCEDURE — 99213 OFFICE O/P EST LOW 20 MIN: CPT | Mod: PBBFAC,NTX | Performed by: NURSE PRACTITIONER

## 2020-10-13 PROCEDURE — 99999 PR PBB SHADOW E&M-EST. PATIENT-LVL III: CPT | Mod: PBBFAC,TXP,, | Performed by: NURSE PRACTITIONER

## 2020-10-13 PROCEDURE — 85610 PROTHROMBIN TIME: CPT | Mod: PBBFAC,NTX

## 2020-10-13 PROCEDURE — 99214 OFFICE O/P EST MOD 30 MIN: CPT | Mod: S$PBB,NTX,, | Performed by: NURSE PRACTITIONER

## 2020-10-13 PROCEDURE — 93793 ANTICOAG MGMT PT WARFARIN: CPT | Mod: ,,,

## 2020-10-13 RX ORDER — TENOFOVIR DISOPROXIL FUMARATE 300 MG/1
300 TABLET, FILM COATED ORAL WEEKLY
Qty: 12 TABLET | Refills: 3 | Status: SHIPPED | OUTPATIENT
Start: 2020-10-13 | End: 2020-12-18 | Stop reason: SDUPTHER

## 2020-10-13 NOTE — PROGRESS NOTES
Patient's INR is subtherapeutic.  Reports on Thursday, 10/08/2020 underwent a graft procedure with the [facility] (Dr. Sidhu).  Held warfarin x 5 days prior to procedure and the night after procedure.  CC was not informed of date of procedure as discussed.  Current warfarin dose schedule was followed post procedure.  Sent to PharmD for review/dosing.;

## 2020-10-13 NOTE — PROGRESS NOTES
Clinic Follow Up:  [facility] Follow Up Note    Reason for Follow Up:  The primary encounter diagnosis was Chronic viral hepatitis B without delta agent and without coma. Diagnoses of Chronic hepatitis B, End stage renal disease, and Anticoagulated were also pertinent to this visit.    PCP: Primary Doctor No   No address on file    HPI:  This is a 50 y.o. male here for follow up of the above  Pt previously managed at [facility] for liver disease.   He was diagnosed with Hep B a few years ago.  Reports being exposed while incarcerated and got tattoos.  He denies any hx of IVDU or high risk sexual behavior.   He has been taking Vemlidy weekly after dialysis since then with good results.   His last labs in June show stable Hep B viral count and LFTs WNL  Per chart review, he had a Fibroscan completed in 2019 which was concerning for cirrhosis.  He does have a hx of thrombocytopenia.  At that time, a TJ liver biopsy was recommended but does not appear that was completed.    He had an EGD in 2019 without any EV or Portal HTN noted.   Recent US in June showed no concerning liver lesion or masses. Liver and spleen were unremarkable.   He is currently undergoing evaluation for kidney transplant.   Denies any abdominal pain.  No nausea or vomiting.  No change in bowel pattern.  No melena or hematochezia. No weight loss.  No upper GI bleeding.  No ascites or BLE.  No overt confusion.  He is on chronic anticoagulation for hx of A. FIb        Review of Systems   Constitutional: Negative for chills, fever, malaise/fatigue and weight loss.   Respiratory: Negative for cough.    Cardiovascular: Negative for chest pain.   Gastrointestinal:        Per HPI   Musculoskeletal: Negative for myalgias.   Skin: Negative for itching and rash.   Neurological: Negative for headaches.   Psychiatric/Behavioral: The patient is not nervous/anxious.        Medical History:  Past Medical History:   Diagnosis Date    Aortic valve  stenosis     s/p mechanical AVR    Atrial fibrillation     Atrial flutter     Cardiomyopathy     CHF (congestive heart failure)     Drug-induced erectile dysfunction     ESRD due to hypertension     HD M, W, F    ESRD on dialysis     Hepatitis B     Hyperlipidemia     Hypertension     DANIEL (obstructive sleep apnea) 11/12/2019    Secondary hyperparathyroidism of renal origin     Supraventricular tachycardia     atrial tachycardia    Tachycardia     Valvular regurgitation     AI, TR       Surgical History:   Past Surgical History:   Procedure Laterality Date    ASD REPAIR      age 8 [facility]    AV Graft Creation Left 03/2017    CARD[facility]AC [facility]HETERIZATION      [facility]     CARDIAC VALVE SURGERY  02/08/2017    22 mm Medtronic AV, 28 mm TV Medtronic annuloplaty ring    COLONOSCOPY N/A 8/27/2019    Procedure: COLONOSCOPY;  Surgeon: Addie John MD;  Location: [facility] ENDO;  Service: Endoscopy;  Laterality: N/A;  dialysis pt *needs K*    COLONOSCOPY N/A 9/3/2020    Procedure: COLONOSCOPY-need INR;  Surgeon: Jemma Youngblood MD;  Location: [facility] ENDO;  Service: Endoscopy;  Laterality: N/A;    ESOPHAGOGASTRODUODENOSCOPY N/A 8/27/2019    Procedure: EGD (ESOPHAGOGASTRODUODENOSCOPY);  Surgeon: Addie John MD;  Location: [facility] ENDO;  Service: Endoscopy;  Laterality: N/A;    RADIOFREQUENCY ABLATION  03/13/2017    Atrial tachycardia    REMOVAL OF GRAFT Left 7/2/2020    Procedure: REMOVAL, GRAFT;  Surgeon: Sascha Sidhu MD;  Location: [facility] OR;  Service: Peripheral Vascular;  Laterality: Left;       Family History:   Family History   Problem Relation Age of Onset    Leukemia Mother     Heart attack Father         massive MI at age 67    No Known Problems Sister     No Known Problems Brother     No Known Problems Sister     No Known Problems Sister     Heart failure Paternal Grandmother     Diabetes Paternal Aunt     Hypertension Paternal Aunt     Leukemia Paternal Aunt         CLL     Suicide Paternal Uncle     Anesthesia problems Paternal Uncle     Diabetes Paternal Aunt     Stroke Paternal Aunt     Valvular heart disease Maternal Aunt     Kidney disease Neg Hx     Colon cancer Neg Hx        Social History:   Social History     Tobacco Use    Smoking status: Never Smoker    Smokeless tobacco: Never Used   Substance Use Topics    Alcohol use: No     Frequency: Never     Comment: quit in 2016; does have heavy drinking history; started drinking at age 13; was drinking a 12 pack over the weekend and two 24 ounces of beer a day during the week along with a pint of hard alcohol    Drug use: No       Allergies: Reviewed    Home Medications:  Current Outpatient Medications on File Prior to Visit   Medication Sig Dispense Refill    amLODIPine (NORVASC) 10 MG tablet take 1 tablet by mouth every day 30 tablet 10    ascorbic acid, vitamin C, (VITAMIN C) 500 MG tablet Take 1 tablet (500 mg total) by mouth 2 (two) times daily.      b complex vitamins tablet Take 1 tablet by mouth once daily.      calcium acetate,phosphat bind, (PHOSLO) 667 mg capsule Take 2 capsules (1,334 mg total) by mouth 3 (three) times daily. 180 capsule 0    diltiaZEM (CARDIZEM CD) 360 MG 24 hr capsule Take 1 capsule (360 mg total) by mouth once daily. 90 capsule 1    epoetin vikki (PROCRIT) 10,000 unit/mL injection Inject 1 mL (10,000 Units total) into the skin every Mon, Wed, Fri. To be given with HD      fish oil-omega-3 fatty acids 300-1,000 mg capsule Take 2 g by mouth once daily.      hydrALAZINE (APRESOLINE) 100 MG tablet TAKE ONE TABLET BY MOUTH THREE TIMES DAILY INCLUDING DIALYSIS DAYS 90 tablet 5    hydrOXYzine pamoate (VISTARIL) 25 MG Cap take 1 capsule by mouth daily as needed for itching 30 capsule 5    labetalol (NORMODYNE) 100 MG tablet TAKE ONE TABLET BY MOUTH THREE TIMES DAILY AS NEEDED FOR HEART RATE > 120 90 tablet 3    labetaloL (NORMODYNE) 200 MG tablet Take 1 tablet (200 mg total) by mouth 3  (three) times daily. 90 tablet 3    lisinopriL (PRINIVIL,ZESTRIL) 40 MG tablet Take 1 tablet (40 mg total) by mouth once daily. 30 tablet 6    multivitamin with minerals tablet Take 1 tablet by mouth once daily.      pantoprazole (PROTONIX) 40 MG tablet TAKE ONE TABLET BY MOUTH EVERY DAY 30 tablet 8    ETHAN-RACQUEL RX 1- mg-mg-mcg Tab TAKE ONE TABLET BY MOUTH EVERY DAY 90 tablet 1    vitamin D (VITAMIN D3) 1000 units Tab Take 1,000 Units by mouth 3 (three) times a week.      warfarin (COUMADIN) 7.5 MG tablet Take 2 tablets by mouth on Tuesday, Thursdays, and Saturday; and 1 tablet on all other days of the week. (Patient taking differently: Take 2 tablets by mouth on Thursdays, Saturdays; and 1 tablet on all other days of the week.) 35 tablet 2    [DISCONTINUED] tenofovir (VIREAD) 300 mg Tab Take 1 tablet (300 mg total) by mouth once a week. 12 tablet 0    acyclovir (ZOVIRAX) 800 MG Tab Take 1 tablet by mouth five times daily for 7 days (Patient not taking: Reported on 10/13/2020) 35 tablet 0    albuterol (VENTOLIN HFA) 90 mcg/actuation inhaler Inhale 2 puffs into the lungs every 6 (six) hours as needed for Wheezing or Shortness of Breath. Rescue (Patient not taking: Reported on 10/13/2020) 18 g 3    calcium acetate,phosphat bind, (PHOSLO) 667 mg capsule Take 2 capsules (1,334 mg total) by mouth 3 (three) times daily with meals. (Patient not taking: Reported on 10/13/2020) 180 capsule 12    enoxaparin (LOVENOX) 100 mg/mL Syrg Inject into the skin once daily starting 5 days before procedure (Patient not taking: Reported on 10/13/2020) 10 mL 0    fluconazole (DIFLUCAN) 100 MG tablet take 2 tabs on day 1 then 1 tab daily for 13 days (Patient not taking: Reported on 10/13/2020) 15 tablet 0    mupirocin (BACTROBAN) 2 % ointment Apply three times daily as directed for 5 days (Patient not taking: Reported on 10/13/2020) 22 g 0    omega-3 fatty acids-vitamin E (FISH OIL) 1,000 mg Cap Take 1 capsule by mouth  "once daily.  0    polyethylene glycol (CLEARLAX) 17 gram PwPk Take 17 g by mouth once daily (Patient not taking: Reported on 8/18/2020) 30 packet 30    tadalafiL (CIALIS) 10 MG tablet Take 1 tablet (10 mg total) by mouth once daily as needed for sexual activity (Patient not taking: Reported on 10/13/2020) 10 tablet 5    tadalafiL (CIALIS) 20 MG Tab Take one tablet po QOD prn sexual activity. (Patient not taking: Reported on 10/13/2020) 10 tablet 11    vit b cmplx 3-fa-vit c-biotin 1- mg-mg-mcg (ETHAN-RACQUEL RX) 1- mg-mg-mcg Tab Take 1 tablet by mouth once daily. (Patient not taking: Reported on 10/13/2020) 90 tablet 2     Current Facility-Administered Medications on File Prior to Visit   Medication Dose Route Frequency Provider Last Rate Last Dose    0.9%  NaCl infusion   Intravenous Continuous Christopher Jane MD        sodium chloride 0.9% flush 10 mL  10 mL Intravenous PRN Christopher Jane MD           Physical Exam:  Vital Signs:  /62   Pulse 90   Ht 6' 2" (1.88 m)   Wt 101.7 kg (224 lb 3.3 oz)   BMI 28.79 kg/m²   Body mass index is 28.79 kg/m².  Physical Exam   Constitutional: He is oriented to person, place, and time. He appears well-developed.   HENT:   Head: Normocephalic.   Neck: Normal range of motion.   Cardiovascular: Normal rate and regular rhythm.   Pulmonary/Chest: Effort normal and breath sounds normal.   Abdominal: Soft. Bowel sounds are normal. He exhibits no distension. There is no abdominal tenderness.   Musculoskeletal: Normal range of motion.   Neurological: He is alert and oriented to person, place, and time.   Skin: Skin is warm and dry.   Psychiatric: He has a normal mood and affect.   Vitals reviewed.      Labs: Pertinent labs reviewed.  MELD-Na score: 32 at 7/3/2020  9:40 AM  MELD score: 31 at 7/3/2020  9:40 AM  Calculated from:  Serum Creatinine: On dialysis. Using 4 mg/dL.  Serum Sodium: 134 mmol/L at 7/3/2020  9:40 AM  Total Bilirubin: 0.5 mg/dL (Rounded to 1 mg/dL) " at 7/1/2020 10:30 AM  INR(ratio): 2.8 at 7/3/2020  4:32 AM  Age: 50 years 5 months   MELD elevated related to ESRD and INR for anticoagulation.     EV: EGD 2019 without EV  HCC: US 6/2020 without lesion or mass    Assessment:  1. Chronic viral hepatitis B without delta agent and without coma    2. Chronic hepatitis B    3. End stage renal disease    4. Anticoagulated        Recommendations:  Unclear of diagnosis of cirrhosis.  Will continue to monitor  - will plan for repeat Fibroscan in December at the time of his US and labs.   - May need to discuss biopsy for confirmation given his possible kidney transplant.  Will discuss with Dr. Youngblood.   - continue Vemlidy at current dose, weekly after dialysis.       Return to Clinic:  Due for MELD labs and HCC imaging in December  6 months with pre-clinic labs and imaging.

## 2020-10-13 NOTE — PROGRESS NOTES
INR not at goal. Medications, chart, and patient findings reviewed. See calendar for adjustments to dose and follow up plan.  Boost and resume dose.

## 2020-10-22 ENCOUNTER — ANTI-COAG VISIT (OUTPATIENT)
Dept: CARDIOLOGY | Facility: CLINIC | Age: 50
End: 2020-10-22
Payer: MEDICARE

## 2020-10-22 DIAGNOSIS — Z79.01 LONG TERM (CURRENT) USE OF ANTICOAGULANTS: Primary | ICD-10-CM

## 2020-10-22 DIAGNOSIS — Z95.2 S/P AVR (AORTIC VALVE REPLACEMENT): ICD-10-CM

## 2020-10-22 LAB — INR PPP: 1.5 (ref 2–3)

## 2020-10-22 PROCEDURE — 93793 PR ANTICOAGULANT MGMT FOR PT TAKING WARFARIN: ICD-10-PCS | Mod: ,,,

## 2020-10-22 PROCEDURE — 93793 ANTICOAG MGMT PT WARFARIN: CPT | Mod: ,,,

## 2020-10-22 PROCEDURE — 85610 PROTHROMBIN TIME: CPT | Mod: PBBFAC

## 2020-10-22 NOTE — PROGRESS NOTES
INR not at goal. Medications, chart, and patient findings reviewed. See calendar for adjustments to dose and follow up plan.  Not a lovenox candidate due to renal failure/HD.  Boost aggressively, recheck at appt Tuesday to see if INR has improved.

## 2020-10-22 NOTE — PROGRESS NOTES
Patient's INR is 1.5 - remains subtherapeutic, despite a boosted dose given on last visit.  Previous instructions were verified and followed.  No signs or symptoms reported.  Sent to PharmD for review and further dosing.

## 2020-10-27 ENCOUNTER — OFFICE VISIT (OUTPATIENT)
Dept: CARDIOLOGY | Facility: CLINIC | Age: 50
End: 2020-10-27
Payer: MEDICARE

## 2020-10-27 ENCOUNTER — ANTI-COAG VISIT (OUTPATIENT)
Dept: CARDIOLOGY | Facility: CLINIC | Age: 50
End: 2020-10-27
Payer: MEDICARE

## 2020-10-27 VITALS
OXYGEN SATURATION: 97 % | SYSTOLIC BLOOD PRESSURE: 130 MMHG | HEART RATE: 99 BPM | DIASTOLIC BLOOD PRESSURE: 66 MMHG | WEIGHT: 231.25 LBS | BODY MASS INDEX: 29.69 KG/M2

## 2020-10-27 DIAGNOSIS — I27.9 CHRONIC PULMONARY HEART DISEASE: ICD-10-CM

## 2020-10-27 DIAGNOSIS — I47.19 ATRIAL TACHYCARDIA: ICD-10-CM

## 2020-10-27 DIAGNOSIS — I48.91 ATRIAL FIBRILLATION, UNSPECIFIED TYPE: ICD-10-CM

## 2020-10-27 DIAGNOSIS — Z95.2 S/P AVR (AORTIC VALVE REPLACEMENT): ICD-10-CM

## 2020-10-27 DIAGNOSIS — B18.1 CHRONIC VIRAL HEPATITIS B WITHOUT DELTA AGENT AND WITHOUT COMA: ICD-10-CM

## 2020-10-27 DIAGNOSIS — I10 ESSENTIAL HYPERTENSION: ICD-10-CM

## 2020-10-27 DIAGNOSIS — Z98.890 HISTORY OF RADIOFREQUENCY ABLATION FOR COMPLEX RIGHT ATRIAL ARRHYTHMIA: ICD-10-CM

## 2020-10-27 DIAGNOSIS — I47.29 NSVT (NONSUSTAINED VENTRICULAR TACHYCARDIA): ICD-10-CM

## 2020-10-27 DIAGNOSIS — I11.0 HYPERTENSIVE CARDIOMEGALY WITH HEART FAILURE: ICD-10-CM

## 2020-10-27 DIAGNOSIS — Z79.01 LONG TERM (CURRENT) USE OF ANTICOAGULANTS: Primary | ICD-10-CM

## 2020-10-27 DIAGNOSIS — I50.32 CHRONIC DIASTOLIC HEART FAILURE: Primary | ICD-10-CM

## 2020-10-27 DIAGNOSIS — Q24.9 ADULT CONGENITAL HEART DISEASE: ICD-10-CM

## 2020-10-27 DIAGNOSIS — I27.20 PULMONARY HTN: ICD-10-CM

## 2020-10-27 DIAGNOSIS — G47.33 OSA (OBSTRUCTIVE SLEEP APNEA): ICD-10-CM

## 2020-10-27 LAB — INR PPP: 3.4 (ref 2–3)

## 2020-10-27 PROCEDURE — 99214 PR OFFICE/OUTPT VISIT, EST, LEVL IV, 30-39 MIN: ICD-10-PCS | Mod: S$PBB,NTX,, | Performed by: INTERNAL MEDICINE

## 2020-10-27 PROCEDURE — 99999 PR PBB SHADOW E&M-EST. PATIENT-LVL III: CPT | Mod: PBBFAC,TXP,, | Performed by: INTERNAL MEDICINE

## 2020-10-27 PROCEDURE — 85610 PROTHROMBIN TIME: CPT | Mod: PBBFAC

## 2020-10-27 PROCEDURE — 99214 OFFICE O/P EST MOD 30 MIN: CPT | Mod: S$PBB,NTX,, | Performed by: INTERNAL MEDICINE

## 2020-10-27 PROCEDURE — 99999 PR PBB SHADOW E&M-EST. PATIENT-LVL III: ICD-10-PCS | Mod: PBBFAC,TXP,, | Performed by: INTERNAL MEDICINE

## 2020-10-27 PROCEDURE — 99213 OFFICE O/P EST LOW 20 MIN: CPT | Mod: PBBFAC,NTX | Performed by: INTERNAL MEDICINE

## 2020-10-27 NOTE — PROGRESS NOTES
"Subjective:   Patient ID:  Isidro White Jr. is a 50 y.o. male who presents for cardiac consult of No chief complaint on file.      Chest Pain   Associated symptoms include malaise/fatigue. Pertinent negatives include no palpitations or shortness of breath.   His past medical history is significant for CHF.   Congestive Heart Failure  Pertinent negatives include no chest pain, palpitations or shortness of breath.   Hypertension  Associated symptoms include malaise/fatigue. Pertinent negatives include no chest pain, palpitations or shortness of breath.     The patient came in today for cardiac consult of No chief complaint on file.    Isidro White Jr. is a 50 y.o. male pt with ASD closure at age 8 ([facility]), HFpEF, s/p AVR with a 22 mm mechanical valve and TV annuloplasty with a 28 mm ring 3/17, HTN, PHTN, SVT, EP performed RFA (3/13/17) and has been on HD - MWF since Feb 8,2016 here for CV follow up.     12/13/18  Holter from 9/4/18 revealed freq PVCs, dilt increased to 240 mg. No palpitations. Has been doing well lately, BP is well controlled. Labwork from HD has been normal. Still makes some urine, says he has some hematuria. Will need annual stress and 2D echo. He is also undergoing workup for HepB will see Dr. Youngblood.     3/15/19  Recent ER eval at [facility] - He presented with chest pain and shortness of breath. He is a dialysis patient has a history of anemia. He states "I believe that the lack of oxygen". Patient has received blood transfusions in the past but not recently. Patient had dialysis yesterday and is a Monday Wednesday Friday dialysis patient. His troponin is mildly elevated. His chest x-ray is relatively clear. We do not see a large effusion and there is no obvious infiltrate at this time. Patient states at times he has chest pain and it feels like he is smothering. The safest plan is to admit the patient and repeat his cardiac enzymes and consider given the patient a blood " transfusion. However the patient is a dialysis patient so we are unable to admit him at this facility. Patient nephrologist is an [facility] and [facility]  Pt Left AMA and now here for follow up.   Pt has SOB usually at night, feels suffocated and can hear himself snore and wakes up. No prior sleep study.      6/18/19  Pt will need EGD/Cscope. Will be bridged with heparin while holding coumadin and followed at coumadin clinic. Pt also needs liver biopsy, he will be ruled out for esophageal varicies. He also had recent admission for PNA, tx with abx. Now feels well, breathing normal. No CP/SOB.     9/19/19  Per recent workup - Fibroscan suggests cirrhosis and he has thrombocytopenia though no other findings of portal hypertension on imaging or EGD. OK to proceed with kidney transplant from hepatology standpoint. For renal transplant process- recent stress and echo are normal.     2/27/20  He was prescibed meds for ED - he was given Cialis 5 mg. Overall feels well, no CP/SOB. He was denied transplant recently but will have further workup next month with stress and echo. He will also need sleep study.   ECG - NSR, LAE, LAD, nonspec changes    6/23/20  ECHO and stress neg in 3/2020. ECHO with grade 2 DD, PA pressure 42 mmHg. Recent INR therapeutic. Sleep study since last visit neg.   Occ decreased energy/fatigue with exertion. He walks 6 labs around the park at times but other times can't do as much. Dry weight is 101 kg. Today he is 103 kg.  He will have cystoscopy in July by Dr. Bass.     7/16/20 - Hosp follow up   He was admitted to Medical Surgical Unit for Arteriovenous graft infection with left graft excision per Vascular Surgery.  IV antibiotics continued.  Heparin gtt noted.  Coumadin continued with pharmacy to dose.  Increased bleeding noted with anticoagulation adjusted.  H/H stable with downward trend noted. On 7/5/2020, pt H/H declined with PRBCs x 1 unit given.  Pt remained  stable and no bleeding from AVG site appreciated. He had NSVT BB and CCB adjusted as well.     INR 1.9 last week. He had to get PRBC 2 days due to HGB 6. He was scheduled to get staples out on Monday by Dr. Sidhu, had swelling and bleeding from AVG. He had a hematoma. He went to HD yesterday AM and was told his HGB was 7.0. He went to [facility]. His Epo was increased and iron increased.     10/27/20  Still undergoing workup for cirrhosis, unclear etiology. Will have repeat Fibroscan in December and maybe biopsy. INR 3.4 today. He has tried cialis with improvement. Bp stable. He had mild pain and swelling on left arm but improved now. He has been active.      Patient feels no chest pain, no leg swelling, no PND, no palpitation, no dizziness, no syncope, no CNS symptoms.    Patient has fairly good exercise tolerance.     Patient is compliant with medications.    3/10/20 Nuclear Stress    The perfusion scan is free of evidence from myocardial ischemia or injury.    There is a  mild intensity fixed defect in the  wall of the left ventricle secondary to diaphragm attenuation.    There is a  intensity defect in the inferior wall of the left ventricle consistent with the RV insertion site.    Gated perfusion images showed an ejection fraction of 50% at rest and 50% post stress.    There is normal wall motion at rest and post stress.    The EKG portion of this study is negative for ischemia.    3/10/20 ECHO  · Mild concentric left ventricular hypertrophy.  · Moderate left atrial enlargement.  · Normal left ventricular systolic function. The estimated ejection fraction is 60%.  · Grade II (moderate) left ventricular diastolic dysfunction consistent with pseudonormalization.  · Normal right ventricular systolic function.  · There is a mechanical aortic valve present. Mean gradient is 38 mmHg.  · Mild mitral regurgitation.  · Mild tricuspid regurgitation.  · Normal central venous pressure (3 mmHg).  · The estimated PA  systolic pressure is 42 mmHg.  · Pulmonary hypertension present.  · Mild mitral stenosis.  · Mild right ventricular enlargement.        9/4/18 HOLTER  TEST DESCRIPTION   PREDOMINANT RHYTHM  1. Sinus rhythm with heart rates varying between 69 and 124 bpm with an average of 85 bpm.   VENTRICULAR ARRHYTHMIAS  1. There were frequent PVCs totalling 1738 and averaging 36 per hour.  There were 9 bigeminal cycles.  There were 3 triplets.   2. There was one (6 beat) run of non-sustained ventricular tachycardia. The rate was 125 bpm.     SUPRA VENTRICULAR ARRHYTHMIAS  1. There were occasional PACs totalling 995 and averaging 20 per hour.  There were 9 triplets.   2. There were 4 (12 beat) runs of non-sustained SVT. The rate was 145 bpm.        Cath 2/17  1. Coronary angiography.      a.     Left main widely patent.      b.     LAD widely patent.      c.     Ramus widely patent and massive in size.      d.     Circumflex widely patent with a small OM 1 and OM 2 branch.      e.     Right coronary widely patent.  2. Hemodynamics:  Right heart catheterization.      a.     Pulmonary capillary wedge pressure 33 at end-expiration.      b.     Pulmonary artery pressure 78/42.      c.     Right ventricular pressure 70/21.      d.     Right atrial pressure 22 with a peak V-wave of 24.      e.     Cardiac output by thermodilution is between 6 and 7       L/minute, by Patricia 6.1 L/minute.    CONCLUSION  1. Normal coronary arteries.  2. Pulmonary hypertension.  3. Severe aortic insufficiency.      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     HD M, W, F    ESRD on dialysis     Hepatitis B     Hyperlipidemia     Hypertension     DANIEL (obstructive sleep apnea) 11/12/2019    Secondary hyperparathyroidism of renal origin      Supraventricular tachycardia     atrial tachycardia    Tachycardia     Valvular regurgitation     AI, TR       Past Surgical History:   Procedure Laterality Date    ASD REPAIR      age 8 [facility]    AV Graft Creation Left 03/2017    CARD[facility]AC [facility]TERIZATION      [facility]     CARDIAC VALVE SURGERY  02/08/2017    22 mm Medtronic AV, 28 mm TV Medtronic annuloplaty ring    COLONOSCOPY N/A 8/27/2019    Procedure: COLONOSCOPY;  Surgeon: Addie John MD;  Location: [facility] ENDO;  Service: Endoscopy;  Laterality: N/A;  dialysis pt *needs K*    COLONOSCOPY N/A 9/3/2020    Procedure: COLONOSCOPY-need INR;  Surgeon: Jemma Youngblood MD;  Location: [facility] ENDO;  Service: Endoscopy;  Laterality: N/A;    ESOPHAGOGASTRODUODENOSCOPY N/A 8/27/2019    Procedure: EGD (ESOPHAGOGASTRODUODENOSCOPY);  Surgeon: Addie John MD;  Location: [facility] ENDO;  Service: Endoscopy;  Laterality: N/A;    RADIOFREQUENCY ABLATION  03/13/2017    Atrial tachycardia    REMOVAL OF GRAFT Left 7/2/2020    Procedure: REMOVAL, GRAFT;  Surgeon: Sascha Sidhu MD;  Location: [facility] OR;  Service: Peripheral Vascular;  Laterality: Left;       Social History     Tobacco Use    Smoking status: Never Smoker    Smokeless tobacco: Never Used   Substance Use Topics    Alcohol use: No     Frequency: Never     Comment: quit in 2016; does have heavy drinking history; started drinking at age 13; was drinking a 12 pack over the weekend and two 24 ounces of beer a day during the week along with a pint of hard alcohol    Drug use: No       Family History   Problem Relation Age of Onset    Leukemia Mother     Heart attack Father         massive MI at age 67    No Known Problems Sister     No Known Problems Brother     No Known Problems Sister     No Known Problems Sister     Heart failure Paternal Grandmother     Diabetes Paternal Aunt     Hypertension Paternal Aunt     Leukemia Paternal Aunt         CLL    Suicide Paternal Uncle     Anesthesia  problems Paternal Uncle     Diabetes Paternal Aunt     Stroke Paternal Aunt     Valvular heart disease Maternal Aunt     Kidney disease Neg Hx     Colon cancer Neg Hx        Patient's Medications   New Prescriptions    No medications on file   Previous Medications    ACYCLOVIR (ZOVIRAX) 800 MG TAB    Take 1 tablet by mouth five times daily for 7 days    ALBUTEROL (VENTOLIN HFA) 90 MCG/ACTUATION INHALER    Inhale 2 puffs into the lungs every 6 (six) hours as needed for Wheezing or Shortness of Breath. Rescue    AMLODIPINE (NORVASC) 10 MG TABLET    take 1 tablet by mouth every day    ASCORBIC ACID, VITAMIN C, (VITAMIN C) 500 MG TABLET    Take 1 tablet (500 mg total) by mouth 2 (two) times daily.    B COMPLEX VITAMINS TABLET    Take 1 tablet by mouth once daily.    CALCIUM ACETATE,PHOSPHAT BIND, (PHOSLO) 667 MG CAPSULE    Take 2 capsules (1,334 mg total) by mouth 3 (three) times daily.    CALCIUM ACETATE,PHOSPHAT BIND, (PHOSLO) 667 MG CAPSULE    Take 2 capsules (1,334 mg total) by mouth 3 (three) times daily with meals.    DILTIAZEM (CARDIZEM CD) 360 MG 24 HR CAPSULE    Take 1 capsule (360 mg total) by mouth once daily.    ENOXAPARIN (LOVENOX) 100 MG/ML SYRG    Inject into the skin once daily starting 5 days before procedure    EPOETIN TORRIE (PROCRIT) 10,000 UNIT/ML INJECTION    Inject 1 mL (10,000 Units total) into the skin every Mon, Wed, Fri. To be given with HD    FISH OIL-OMEGA-3 FATTY ACIDS 300-1,000 MG CAPSULE    Take 2 g by mouth once daily.    FLUCONAZOLE (DIFLUCAN) 100 MG TABLET    take 2 tabs on day 1 then 1 tab daily for 13 days    HYDRALAZINE (APRESOLINE) 100 MG TABLET    TAKE ONE TABLET BY MOUTH THREE TIMES DAILY INCLUDING DIALYSIS DAYS    HYDROXYZINE PAMOATE (VISTARIL) 25 MG CAP    take 1 capsule by mouth daily as needed for itching    LABETALOL (NORMODYNE) 100 MG TABLET    TAKE ONE TABLET BY MOUTH THREE TIMES DAILY AS NEEDED FOR HEART RATE > 120    LABETALOL (NORMODYNE) 200 MG TABLET    Take 1  tablet (200 mg total) by mouth 3 (three) times daily.    LISINOPRIL (PRINIVIL,ZESTRIL) 40 MG TABLET    Take 1 tablet (40 mg total) by mouth once daily.    MULTIVITAMIN WITH MINERALS TABLET    Take 1 tablet by mouth once daily.    MUPIROCIN (BACTROBAN) 2 % OINTMENT    Apply three times daily as directed for 5 days    OMEGA-3 FATTY ACIDS-VITAMIN E (FISH OIL) 1,000 MG CAP    Take 1 capsule by mouth once daily.    PANTOPRAZOLE (PROTONIX) 40 MG TABLET    TAKE ONE TABLET BY MOUTH EVERY DAY    POLYETHYLENE GLYCOL (CLEARLAX) 17 GRAM PWPK    Take 17 g by mouth once daily    ETHAN-RACQUEL RX 1- MG-MG-MCG TAB    TAKE ONE TABLET BY MOUTH EVERY DAY    TADALAFIL (CIALIS) 10 MG TABLET    Take 1 tablet (10 mg total) by mouth once daily as needed for sexual activity    TADALAFIL (CIALIS) 20 MG TAB    Take one tablet po QOD prn sexual activity.    TENOFOVIR (VIREAD) 300 MG TAB    Take 1 tablet (300 mg total) by mouth once a week.    VIT B CMPLX 3-FA-VIT C-BIOTIN 1- MG-MG-MCG (ETHAN-RACQUEL RX) 1- MG-MG-MCG TAB    Take 1 tablet by mouth once daily.    VITAMIN D (VITAMIN D3) 1000 UNITS TAB    Take 1,000 Units by mouth 3 (three) times a week.    WARFARIN (COUMADIN) 7.5 MG TABLET    Take 2 tablets by mouth on Tuesday, Thursdays, and Saturday; and 1 tablet on all other days of the week.   Modified Medications    No medications on file   Discontinued Medications    No medications on file       Review of Systems   Constitutional: Positive for malaise/fatigue.   HENT: Negative.    Eyes: Negative.    Respiratory: Negative for shortness of breath.    Cardiovascular: Negative for chest pain and palpitations.   Gastrointestinal: Negative.    Genitourinary: Negative for hematuria.   Musculoskeletal: Positive for joint pain.   Skin: Negative.    Neurological: Negative.    Endo/Heme/Allergies: Negative.    Psychiatric/Behavioral: Negative.    All 12 systems otherwise negative.      Wt Readings from Last 3 Encounters:   10/13/20 101.7 kg  (224 lb 3.3 oz)   09/03/20 102.9 kg (226 lb 13.7 oz)   08/18/20 103.8 kg (228 lb 13.4 oz)     Temp Readings from Last 3 Encounters:   09/03/20 97.9 °F (36.6 °C) (Temporal)   07/09/20 97.2 °F (36.2 °C) (Temporal)   07/06/20 98 °F (36.7 °C)     BP Readings from Last 3 Encounters:   10/13/20 120/62   09/03/20 (!) 152/92   08/18/20 126/60     Pulse Readings from Last 3 Encounters:   10/13/20 90   09/03/20 89   08/18/20 96       There were no vitals taken for this visit.     Objective:   Physical Exam   Constitutional: He is oriented to person, place, and time. He appears well-developed and well-nourished. No distress.   HENT:   Head: Normocephalic and atraumatic.   Nose: Nose normal.   Mouth/Throat: Oropharynx is clear and moist.   Eyes: Conjunctivae and EOM are normal. No scleral icterus.   Neck: Normal range of motion. Neck supple. No JVD present. No thyromegaly present.   Cardiovascular: Normal rate, regular rhythm, S1 normal and S2 normal. Exam reveals no gallop, no S3, no S4 and no friction rub.   Murmur heard.   Midsystolic murmur is present at the upper right sternal border.  [place] S2   Pulmonary/Chest: Effort normal and breath sounds normal. No stridor. No respiratory distress. He has no wheezes. He has no rales. He exhibits no tenderness.   Abdominal: Soft. Bowel sounds are normal. He exhibits no distension and no mass. There is no abdominal tenderness. There is no rebound.   Genitourinary:    Genitourinary Comments: Deferred     Musculoskeletal: Normal range of motion.         General: No tenderness, deformity or edema.   Lymphadenopathy:     He has no cervical adenopathy.   Neurological: He is alert and oriented to person, place, and time. He exhibits normal muscle tone. Coordination normal.   Skin: Skin is warm and dry. No rash noted. He is not diaphoretic. No erythema. No pallor.   Psychiatric: He has a normal mood and affect. His behavior is normal. Judgment and thought content normal.   Nursing note and  vitals reviewed.      Lab Results   Component Value Date     07/06/2020    K 5.5 (H) 09/03/2020     07/06/2020    CO2 19 (L) 07/06/2020    BUN 97 (H) 07/06/2020    CREATININE 19.4 (H) 07/06/2020    GLU 97 07/06/2020    HGBA1C 4.8 09/20/2019    MG 2.1 04/30/2019    AST 17 07/01/2020    ALT 18 07/01/2020    ALBUMIN 3.4 (L) 07/01/2020    PROT 8.6 (H) 07/01/2020    BILITOT 0.5 07/01/2020    WBC 8.06 07/06/2020    HGB 8.0 (L) 07/06/2020    HCT 24.1 (L) 07/06/2020    HCT 25 (L) 02/08/2017     (H) 07/06/2020     (L) 07/06/2020    INR 3.4 (A) 10/27/2020    INR 1.5 (H) 09/03/2020    CHOL 213 (H) 01/08/2019    HDL 50 01/08/2019    LDLCALC 142.4 01/08/2019    TRIG 103 01/08/2019     (H) 07/03/2020     Assessment:      1. Chronic diastolic heart failure    2. Atrial fibrillation, unspecified type    3. Atrial tachycardia    4. Adult congenital heart disease    5. Chronic pulmonary heart disease    6. Pulmonary HTN    7. History of radiofrequency ablation for complex right atrial arrhythmia    8. Hypertensive cardiomegaly with heart failure    9. NSVT (nonsustained ventricular tachycardia)    10. S/P AVR (aortic valve replacement)    11. Essential hypertension    12. DANIEL (obstructive sleep apnea)        Plan:   1. Chronic Diastolic HF  - recovered EF  - cont low salt diet  - pt euvolemic     2. S/p mech AVR  - cont coumadin, INR 3.4, will redose  - f/u with coumadin clinic here    3. ESRD  - cont HD  - stable CV status for transplant, recent stress/echo negative    4. HTN  - cont meds    6. PVCs, SVT s/p RFA   - cont BB and CCB    7. HLD  - cont statin    8. Pre-OP CV evaluation prior to renal transplant  Moderate periop risk of CV events for high risk procedure.  Good functional and exercise capacity.  No chest pain, active arrhythmia and CHF symptoms.  Continue BB and Statin periop.  Need to have stress and echo repeat in 3/2021    9. ED  - cont Cialis  - monitor BP, avoid nitrates     10. Pulm  HTN/ DANIEL  - no significant apnea on recent study  - f/u with pulm  - cont tx    11. Chronic hep B   - cont tx per hepatology  - further workup pending for cirrhosis     Thank you for allowing me to participate in this patient's care. Please do not hesitate to contact me with any questions or concerns. Consult note has been forwarded to the referral physician.

## 2020-11-05 ENCOUNTER — TELEPHONE (OUTPATIENT)
Dept: TRANSPLANT | Facility: CLINIC | Age: 50
End: 2020-11-05

## 2020-11-05 ENCOUNTER — ANTI-COAG VISIT (OUTPATIENT)
Dept: CARDIOLOGY | Facility: CLINIC | Age: 50
End: 2020-11-05
Payer: MEDICARE

## 2020-11-05 ENCOUNTER — LAB VISIT (OUTPATIENT)
Dept: LAB | Facility: HOSPITAL | Age: 50
End: 2020-11-05
Payer: MEDICARE

## 2020-11-05 ENCOUNTER — OFFICE VISIT (OUTPATIENT)
Dept: TRANSPLANT | Facility: CLINIC | Age: 50
End: 2020-11-05
Payer: MEDICARE

## 2020-11-05 VITALS
DIASTOLIC BLOOD PRESSURE: 79 MMHG | BODY MASS INDEX: 30.52 KG/M2 | HEART RATE: 83 BPM | TEMPERATURE: 99 F | HEIGHT: 72 IN | SYSTOLIC BLOOD PRESSURE: 166 MMHG | OXYGEN SATURATION: 97 % | WEIGHT: 225.31 LBS | RESPIRATION RATE: 16 BRPM

## 2020-11-05 DIAGNOSIS — B18.1 CHRONIC VIRAL HEPATITIS B WITHOUT DELTA AGENT AND WITHOUT COMA: ICD-10-CM

## 2020-11-05 DIAGNOSIS — Z99.2 ESRD (END STAGE RENAL DISEASE) ON DIALYSIS: Chronic | ICD-10-CM

## 2020-11-05 DIAGNOSIS — D63.8 ANEMIA OF CHRONIC DISEASE: ICD-10-CM

## 2020-11-05 DIAGNOSIS — N18.6 ESRD (END STAGE RENAL DISEASE) ON DIALYSIS: Chronic | ICD-10-CM

## 2020-11-05 DIAGNOSIS — Z76.82 ORGAN TRANSPLANT CANDIDATE: ICD-10-CM

## 2020-11-05 DIAGNOSIS — J43.9 MIXED RESTRICTIVE AND OBSTRUCTIVE LUNG DISEASE: ICD-10-CM

## 2020-11-05 DIAGNOSIS — I10 ESSENTIAL HYPERTENSION: ICD-10-CM

## 2020-11-05 DIAGNOSIS — I11.0 HYPERTENSIVE CARDIOMEGALY WITH HEART FAILURE: ICD-10-CM

## 2020-11-05 DIAGNOSIS — Z01.818 PRE-TRANSPLANT EVALUATION FOR CHRONIC KIDNEY DISEASE: Primary | ICD-10-CM

## 2020-11-05 DIAGNOSIS — Z79.01 ANTICOAGULATED: ICD-10-CM

## 2020-11-05 DIAGNOSIS — Z95.2 HISTORY OF MECHANICAL AORTIC VALVE REPLACEMENT: ICD-10-CM

## 2020-11-05 DIAGNOSIS — J98.4 MIXED RESTRICTIVE AND OBSTRUCTIVE LUNG DISEASE: ICD-10-CM

## 2020-11-05 DIAGNOSIS — I48.91 ATRIAL FIBRILLATION, UNSPECIFIED TYPE: ICD-10-CM

## 2020-11-05 DIAGNOSIS — T14.8XXA OPEN WOUND: ICD-10-CM

## 2020-11-05 LAB
ABO + RH BLD: NORMAL
ALBUMIN SERPL BCP-MCNC: 3.5 G/DL (ref 3.5–5.2)
ALP SERPL-CCNC: 82 U/L (ref 55–135)
ALT SERPL W/O P-5'-P-CCNC: 15 U/L (ref 10–44)
ANION GAP SERPL CALC-SCNC: 16 MMOL/L (ref 8–16)
APTT BLDCRRT: 43.4 SEC (ref 21–32)
AST SERPL-CCNC: 22 U/L (ref 10–40)
BASOPHILS # BLD AUTO: 0.01 K/UL (ref 0–0.2)
BASOPHILS NFR BLD: 0.2 % (ref 0–1.9)
BILIRUB DIRECT SERPL-MCNC: 0.2 MG/DL (ref 0.1–0.3)
BILIRUB SERPL-MCNC: 0.5 MG/DL (ref 0.1–1)
BLD GP AB SCN CELLS X3 SERPL QL: NORMAL
BUN SERPL-MCNC: 37 MG/DL (ref 6–20)
CALCIUM SERPL-MCNC: 9.7 MG/DL (ref 8.7–10.5)
CHLORIDE SERPL-SCNC: 96 MMOL/L (ref 95–110)
CHOLEST SERPL-MCNC: 248 MG/DL (ref 120–199)
CHOLEST/HDLC SERPL: 5.9 {RATIO} (ref 2–5)
CO2 SERPL-SCNC: 29 MMOL/L (ref 23–29)
COMPLEXED PSA SERPL-MCNC: 0.38 NG/ML (ref 0–4)
CREAT SERPL-MCNC: 10.2 MG/DL (ref 0.5–1.4)
DIFFERENTIAL METHOD: ABNORMAL
EOSINOPHIL # BLD AUTO: 0.3 K/UL (ref 0–0.5)
EOSINOPHIL NFR BLD: 4.9 % (ref 0–8)
ERYTHROCYTE [DISTWIDTH] IN BLOOD BY AUTOMATED COUNT: 13.9 % (ref 11.5–14.5)
EST. GFR  (AFRICAN AMERICAN): 6.1 ML/MIN/1.73 M^2
EST. GFR  (NON AFRICAN AMERICAN): 5.3 ML/MIN/1.73 M^2
GLUCOSE SERPL-MCNC: 83 MG/DL (ref 70–110)
HBV CORE AB SERPL QL IA: POSITIVE
HBV SURFACE AG SERPL QL IA: POSITIVE
HCT VFR BLD AUTO: 30.7 % (ref 40–54)
HCV AB SERPL QL IA: NEGATIVE
HDLC SERPL-MCNC: 42 MG/DL (ref 40–75)
HDLC SERPL: 16.9 % (ref 20–50)
HGB BLD-MCNC: 10.1 G/DL (ref 14–18)
HIV 1+2 AB+HIV1 P24 AG SERPL QL IA: NEGATIVE
IMM GRANULOCYTES # BLD AUTO: 0.01 K/UL (ref 0–0.04)
IMM GRANULOCYTES NFR BLD AUTO: 0.2 % (ref 0–0.5)
INR PPP: 1.8 (ref 0.8–1.2)
LDLC SERPL CALC-MCNC: 164.8 MG/DL (ref 63–159)
LYMPHOCYTES # BLD AUTO: 1 K/UL (ref 1–4.8)
LYMPHOCYTES NFR BLD: 17.6 % (ref 18–48)
MCH RBC QN AUTO: 36.2 PG (ref 27–31)
MCHC RBC AUTO-ENTMCNC: 32.9 G/DL (ref 32–36)
MCV RBC AUTO: 110 FL (ref 82–98)
MONOCYTES # BLD AUTO: 0.7 K/UL (ref 0.3–1)
MONOCYTES NFR BLD: 12.3 % (ref 4–15)
NEUTROPHILS # BLD AUTO: 3.5 K/UL (ref 1.8–7.7)
NEUTROPHILS NFR BLD: 64.8 % (ref 38–73)
NONHDLC SERPL-MCNC: 206 MG/DL
NRBC BLD-RTO: 0 /100 WBC
PHOSPHATE SERPL-MCNC: 4.7 MG/DL (ref 2.7–4.5)
PLATELET # BLD AUTO: 151 K/UL (ref 150–350)
PMV BLD AUTO: 10.7 FL (ref 9.2–12.9)
POTASSIUM SERPL-SCNC: 4.1 MMOL/L (ref 3.5–5.1)
PROT SERPL-MCNC: 8.6 G/DL (ref 6–8.4)
PROTHROMBIN TIME: 19.5 SEC (ref 9–12.5)
PTH-INTACT SERPL-MCNC: 563 PG/ML (ref 9–77)
RBC # BLD AUTO: 2.79 M/UL (ref 4.6–6.2)
RPR SER QL: NORMAL
SODIUM SERPL-SCNC: 141 MMOL/L (ref 136–145)
TRIGL SERPL-MCNC: 206 MG/DL (ref 30–150)
WBC # BLD AUTO: 5.46 K/UL (ref 3.9–12.7)

## 2020-11-05 PROCEDURE — 85730 THROMBOPLASTIN TIME PARTIAL: CPT | Mod: TXP

## 2020-11-05 PROCEDURE — 84153 ASSAY OF PSA TOTAL: CPT | Mod: TXP

## 2020-11-05 PROCEDURE — 86480 TB TEST CELL IMMUN MEASURE: CPT | Mod: TXP

## 2020-11-05 PROCEDURE — 86592 SYPHILIS TEST NON-TREP QUAL: CPT | Mod: TXP

## 2020-11-05 PROCEDURE — 86803 HEPATITIS C AB TEST: CPT | Mod: TXP

## 2020-11-05 PROCEDURE — 86787 VARICELLA-ZOSTER ANTIBODY: CPT | Mod: TXP

## 2020-11-05 PROCEDURE — 36415 COLL VENOUS BLD VENIPUNCTURE: CPT | Mod: TXP

## 2020-11-05 PROCEDURE — 86706 HEP B SURFACE ANTIBODY: CPT | Mod: TXP

## 2020-11-05 PROCEDURE — 86825 HLA X-MATH NON-CYTOTOXIC: CPT | Mod: 91,PO,TXP

## 2020-11-05 PROCEDURE — 86703 HIV-1/HIV-2 1 RESULT ANTBDY: CPT | Mod: TXP

## 2020-11-05 PROCEDURE — 80053 COMPREHEN METABOLIC PANEL: CPT | Mod: TXP

## 2020-11-05 PROCEDURE — 99215 PR OFFICE/OUTPT VISIT, EST, LEVL V, 40-54 MIN: ICD-10-PCS | Mod: S$PBB,TXP,, | Performed by: NURSE PRACTITIONER

## 2020-11-05 PROCEDURE — 83970 ASSAY OF PARATHORMONE: CPT | Mod: TXP

## 2020-11-05 PROCEDURE — 87340 HEPATITIS B SURFACE AG IA: CPT | Mod: TXP

## 2020-11-05 PROCEDURE — 86829 HLA CLASS I/II ANTIBODY QUAL: CPT | Mod: PO,TXP

## 2020-11-05 PROCEDURE — 86644 CMV ANTIBODY: CPT | Mod: TXP

## 2020-11-05 PROCEDURE — 86832 HLA CLASS I HIGH DEFIN QUAL: CPT | Mod: PO,TXP

## 2020-11-05 PROCEDURE — 84100 ASSAY OF PHOSPHORUS: CPT | Mod: TXP

## 2020-11-05 PROCEDURE — 99244 OFF/OP CNSLTJ NEW/EST MOD 40: CPT | Mod: S$PBB,TXP,, | Performed by: SURGERY

## 2020-11-05 PROCEDURE — 86833 HLA CLASS II HIGH DEFIN QUAL: CPT | Mod: PO,TXP

## 2020-11-05 PROCEDURE — 86704 HEP B CORE ANTIBODY TOTAL: CPT | Mod: TXP

## 2020-11-05 PROCEDURE — 86825 HLA X-MATH NON-CYTOTOXIC: CPT | Mod: PO,TXP

## 2020-11-05 PROCEDURE — 99215 OFFICE O/P EST HI 40 MIN: CPT | Mod: PBBFAC,25,TXP | Performed by: NURSE PRACTITIONER

## 2020-11-05 PROCEDURE — 99999 PR PBB SHADOW E&M-EST. PATIENT-LVL V: CPT | Mod: PBBFAC,TXP,, | Performed by: NURSE PRACTITIONER

## 2020-11-05 PROCEDURE — 85610 PROTHROMBIN TIME: CPT | Mod: TXP

## 2020-11-05 PROCEDURE — 80061 LIPID PANEL: CPT | Mod: TXP

## 2020-11-05 PROCEDURE — 99215 OFFICE O/P EST HI 40 MIN: CPT | Mod: S$PBB,TXP,, | Performed by: NURSE PRACTITIONER

## 2020-11-05 PROCEDURE — 93793 ANTICOAG MGMT PT WARFARIN: CPT | Mod: ,,,

## 2020-11-05 PROCEDURE — 86828 HLA CLASS I&II ANTIBODY QUAL: CPT | Mod: 91,PO,TXP

## 2020-11-05 PROCEDURE — 86901 BLOOD TYPING SEROLOGIC RH(D): CPT | Mod: TXP

## 2020-11-05 PROCEDURE — 82248 BILIRUBIN DIRECT: CPT | Mod: TXP

## 2020-11-05 PROCEDURE — 85025 COMPLETE CBC W/AUTO DIFF WBC: CPT | Mod: TXP

## 2020-11-05 PROCEDURE — 93793 PR ANTICOAGULANT MGMT FOR PT TAKING WARFARIN: ICD-10-PCS | Mod: ,,,

## 2020-11-05 PROCEDURE — 99999 PR PBB SHADOW E&M-EST. PATIENT-LVL V: ICD-10-PCS | Mod: PBBFAC,TXP,, | Performed by: NURSE PRACTITIONER

## 2020-11-05 PROCEDURE — 99244 PR OFFICE CONSULTATION,LEVEL IV: ICD-10-PCS | Mod: S$PBB,TXP,, | Performed by: SURGERY

## 2020-11-05 RX ORDER — FERROUS SULFATE 324(65)MG
325 TABLET, DELAYED RELEASE (ENTERIC COATED) ORAL DAILY
COMMUNITY

## 2020-11-05 NOTE — PROGRESS NOTES
Transplant Nephrology  Kidney Transplant Recipient Evaluation    Referring Physician: Sayda Hi  Current Nephrologist: Sayda Hi    Subjective:   CC:  Initial evaluation of kidney transplant candidacy.    HPI:  Mr. White is a 50 y.o. year old Black or  male who has presented to be evaluated as a potential kidney transplant recipient.  He has ESRD secondary to HTN.  Patient is currently on hemodialysis started on 2/12/2017. Patient is dialyzing on MWF schedule.  Patient reports that he is tolerating dialysis well.. Dialyzes for 4 hours per session. BP stable.  He has a LUE AV graft for dialysis access.     Previous Transplant: no      HTN    Chronic Hep B  On tenovir , managed by hepatology in [place]     Cardiology--Follows with Dr Fuad Gudino,  HX afib, atrial valve stenosis, cardiomyopathy, s/p AVR. On Coumadin     FX assessment:  Exercises , walks on treadmill and will jump rope. Denies SOB, chest pain or claudication.     Recently had vascular access surgery ~ 4 weeks ago, and had staples into the left axillary area. The staples were removed and the area dehisced and has pus drainage. He reports getting IV antibiotics at dialysis .  See Pic below.    HX pulm nodules and mild restrictive lung disease   9/5/2019 Chest CT WO   Impression:  There is improvement in the appearance the chest with significant decrease in the pulmonary interstitial infiltrates seen on previous exam.  There continues to be some mild scarring in lung bases.  There is a 0.5 cm noncalcified pulmonary nodule posteriorly in the left lower lobe       -Pt reports nocturnal oxygen d/t pneumonia infection  He is no longer on oxygen      2/18/2020 Pulm note / Epic -   Polysomnogram (CPAP will be added if patient meets diagnostic criteria.)        Standing Status:   Future       Standing Expiration Date:   2/17/2021     Patient is currently in stable compensated pulmonary status.  Current pulmonary interventions include  p.r.n. short-acting beta agonists, continue nocturnal oxygen awaiting completed home sleep study, no recent respiratory infectious exacerbations since April 2019, spirometry have shown improvement in FEV1 and FVC.  No current pulmonary contraindications for transplant eligibility     Follow up follow up after sleep study.     This note was prepared using voice recognition system and is likely to have sound alike errors that may have been overlooked even after proof reading.  Please call me with any questions     Discussed diagnosis, its evaluation, treatment and usual course. All questions answered.     Thank you for the courtesy of participating in the care of this patient     Hima Urbina MD       HX hematuria :   Cystoscopy on 7/9/2020  Findings: normal cysto   Impression/Plan:   1. Negative hematuria workup, no contraindications to transplant.  RTC 1 year.      Colonoscopy completed on 9/3/2020    PSA, Screen (ng/mL)   Date Value   11/05/2020 0.38       Past Medical and Surgical History: Mr. White  has a past medical history of Anemia, Aortic valve stenosis, Atrial fibrillation, Atrial flutter, Cardiomyopathy, CHF (congestive heart failure), Drug-induced erectile dysfunction, ESRD due to hypertension, ESRD on dialysis, GERD (gastroesophageal reflux disease), Hepatitis B, Hyperlipidemia, Hypertension, Obesity, DANIEL (obstructive sleep apnea), Secondary hyperparathyroidism of renal origin, Supraventricular tachycardia, Tachycardia, and Valvular regurgitation.  He has a past surgical history that includes ASD repair; Cardiac valuve replacement (02/08/2017); Radiofrequency ablation (03/13/2017); Cardiac catheterization; AV Graft Creation (Left, 03/2017); Colonoscopy (N/A, 8/27/2019); Esophagogastroduodenoscopy (N/A, 8/27/2019); Removal of graft (Left, 7/2/2020); and Colonoscopy (N/A, 9/3/2020).    Past Social and Family History: Mr. White reports that he has never smoked. He has never used smokeless tobacco.  He reports that he does not drink alcohol or use drugs. His family history includes Anesthesia problems in his paternal uncle; Cancer in his mother; Diabetes in his paternal aunt and paternal aunt; Heart attack in his father; Heart failure in his paternal grandmother; Hypertension in his paternal aunt; Leukemia in his mother and paternal aunt; No Known Problems in his brother, sister, sister, and sister; Stroke in his paternal aunt; Suicide in his paternal uncle; Valvular heart disease in his maternal aunt.    Current Outpatient Medications   Medication Sig Dispense Refill    albuterol (VENTOLIN HFA) 90 mcg/actuation inhaler Inhale 2 puffs into the lungs every 6 (six) hours as needed for Wheezing or Shortness of Breath. Rescue 18 g 3    amLODIPine (NORVASC) 10 MG tablet take 1 tablet by mouth every day 30 tablet 10    ascorbic acid, vitamin C, (VITAMIN C) 500 MG tablet Take 1 tablet (500 mg total) by mouth 2 (two) times daily.      b complex vitamins tablet Take 1 tablet by mouth once daily.      calcium acetate,phosphat bind, (PHOSLO) 667 mg capsule Take 2 capsules (1,334 mg total) by mouth 3 (three) times daily with meals. 180 capsule 12    diltiaZEM (CARDIZEM CD) 360 MG 24 hr capsule Take 1 capsule (360 mg total) by mouth once daily. 90 capsule 1    epoetin vikki (PROCRIT) 10,000 unit/mL injection Inject 1 mL (10,000 Units total) into the skin every Mon, Wed, Fri. To be given with HD      ferrous sulfate 324 mg (65 mg iron) TbEC Take 325 mg by mouth once daily.      fish oil-omega-3 fatty acids 300-1,000 mg capsule Take 2 g by mouth once daily.      hydrALAZINE (APRESOLINE) 100 MG tablet TAKE ONE TABLET BY MOUTH THREE TIMES DAILY INCLUDING DIALYSIS DAYS 90 tablet 5    hydrOXYzine pamoate (VISTARIL) 25 MG Cap take 1 capsule by mouth daily as needed for itching 30 capsule 5    labetaloL (NORMODYNE) 200 MG tablet Take 1 tablet (200 mg total) by mouth 3 (three) times daily. 90 tablet 3    lisinopriL  (PRINIVIL,ZESTRIL) 40 MG tablet Take 1 tablet (40 mg total) by mouth once daily. 30 tablet 6    multivitamin with minerals tablet Take 1 tablet by mouth once daily.      pantoprazole (PROTONIX) 40 MG tablet TAKE ONE TABLET BY MOUTH EVERY DAY 30 tablet 8    ETHAN-RACQUEL RX 1- mg-mg-mcg Tab TAKE ONE TABLET BY MOUTH EVERY DAY 90 tablet 1    tadalafiL (CIALIS) 20 MG Tab Take one tablet po QOD prn sexual activity. 10 tablet 11    tenofovir (VIREAD) 300 mg Tab Take 1 tablet (300 mg total) by mouth once a week. 12 tablet 3    vitamin D (VITAMIN D3) 1000 units Tab Take 1,000 Units by mouth 3 (three) times a week.      warfarin (COUMADIN) 7.5 MG tablet Take 2 tablets by mouth on Tuesday, Thursdays, and Saturday; and 1 tablet on all other days of the week. (Patient taking differently: Take 2 tablets by mouth on Thursdays, Saturdays; and 1 tablet on all other days of the week.) 35 tablet 2    omega-3 fatty acids-vitamin E (FISH OIL) 1,000 mg Cap Take 1 capsule by mouth once daily.  0     No current facility-administered medications for this visit.      Facility-Administered Medications Ordered in Other Visits   Medication Dose Route Frequency Provider Last Rate Last Dose    0.9%  NaCl infusion   Intravenous Continuous Christopher Jane MD        sodium chloride 0.9% flush 10 mL  10 mL Intravenous PRN Christopher Jane MD           Review of Systems   Constitutional: Negative for activity change, appetite change, chills, fatigue, fever and unexpected weight change.   HENT: Negative for congestion, facial swelling, postnasal drip, rhinorrhea, sinus pressure, sore throat and trouble swallowing.    Eyes: Negative for pain, redness and visual disturbance.   Respiratory: Negative for cough, chest tightness, shortness of breath and wheezing.    Cardiovascular: Negative.  Negative for chest pain, palpitations and leg swelling.   Gastrointestinal: Negative for abdominal pain, diarrhea, nausea and vomiting.        HX GERD  "  Genitourinary: Positive for decreased urine volume. Negative for dysuria, flank pain and urgency.        Urinates ~ 1 x/day   Musculoskeletal: Negative for gait problem, neck pain and neck stiffness.   Skin: Positive for wound. Negative for rash.        Left axillary area    Allergic/Immunologic: Negative for environmental allergies, food allergies and immunocompromised state.   Neurological: Negative for dizziness, weakness, light-headedness and headaches.   Hematological: Bruises/bleeds easily.        Coumadin, s/p AVR   Psychiatric/Behavioral: Negative for agitation and confusion. The patient is not nervous/anxious.            Objective:   Blood pressure (!) 166/79, pulse 83, temperature 98.6 °F (37 °C), temperature source Oral, resp. rate 16, height 6' 0.32" (1.837 m), weight 102.2 kg (225 lb 5 oz), SpO2 97 %.body mass index is 30.29 kg/m².    Physical Exam  Musculoskeletal:        Arms:          Labs:  Lab Results   Component Value Date    WBC 5.46 11/05/2020    HGB 10.1 (L) 11/05/2020    HCT 30.7 (L) 11/05/2020     11/05/2020    K 4.1 11/05/2020    CL 96 11/05/2020    CO2 29 11/05/2020    BUN 37 (H) 11/05/2020    CREATININE 10.2 (H) 11/05/2020    EGFRNONAA 5.3 (A) 11/05/2020    CALCIUM 9.7 11/05/2020    PHOS 4.7 (H) 11/05/2020    MG 2.1 04/30/2019    ALBUMIN 3.5 11/05/2020    AST 22 11/05/2020    ALT 15 11/05/2020    UTPCR 1.13 (H) 01/08/2019    .0 (H) 11/05/2020       Lab Results   Component Value Date    PREALBUMIN 13 (L) 03/09/2017    BILIRUBINUA Negative 04/29/2019    PROTEINUA 2+ (A) 04/29/2019    NITRITE Negative 04/29/2019    RBCUA 4 06/09/2020    WBCUA 3 06/09/2020       No results found for: HLAABCTYPE    Labs were reviewed with the patient.    Assessment:     1. Pre-transplant evaluation for chronic kidney disease    2. ESRD (end stage renal disease) on dialysis    3. Essential hypertension    4. Hypertensive cardiomegaly with heart failure    5. Atrial fibrillation, unspecified type  "   6. History of mechanical aortic valve replacement    7. Anticoagulated    8. Mixed restrictive and obstructive lung disease    9. Chronic viral hepatitis B without delta agent and without coma    10. Anemia of chronic disease    11. Open wound        Plan:   Needs wound care for unhealed left axillary wound--  Can return to complete work up once wound has healed.     Upon return will need cardiology clearance by his cardiologist Dr Gudino d/t  HX afib, atrial valve stenosis, cardiomyopathy, s/p AVR. On Coumadin     He will also need hepatology clearance and recs, HX chronic hep B On tenovir , managed by hepatology in [place]     Pulmonology referral with PFTs and 6 minute walk , HX pulm nodules and mild restrictive lung disease   Clarify if patient remains on nocturnal  oxygen     Transplant Candidacy:   Based on available information, Mr. White is an unacceptable kidney transplant candidate d/t open wound.   Meets center eligibility for accepting HCV+ donor offer -  Defer to hepatology   Patient educated on HCV+ donors. Isidro is willing to accept HCV+ donor offer - yes   Patient is a candidate for KDPI > 85 kidney donor offer - no d/t weight and Coumadin.  Final determination of transplant candidacy will be made once workup is complete and reviewed by the selection committee.    Sloane Lui, ALEX       UNOS Patient Status  Functional Status: 60% - Requires occasional assistance but is able to care for needs  Physical Capacity: No Limitations

## 2020-11-05 NOTE — LETTER
Date: 11/13/2020          Referral Process      To: Dialysis Unit  and Charge RN From: Carlotta Foreman LCSW        Kidney Transplant   RE: Isidro White Jr., 1970, [id number]     At [facility], we conduct adherence checks as an important part of transplant care. Initial and listed patient assessments are not complete without adherence information.        Please complete the following information:     Current Dry Weight: ___________         Most Recent Pre-Treatment Weight: ___________ /date: _________                    Data from the last 3 months:  (data from last 3 months preferred):    Number of AMAs with dates, time, and reasons: ____________________________________________________    ______________________________________________________________________________________________    ______________________________________________________________________________________________    Number of No-Shows with dates and reasons: ______________________________________________________      ______________________________________________________________________________________________    Last intact PTH:  ___________/date: __________      Any concerns with Labs:  YES / NO      If yes, please explain:  ___________________________________________________________________________    ______________________________________________________________________________________________    Any concerns with Caregivers:  YES / NO    If yes, please explain:  ___________________________________________________________________________    ______________________________________________________________________________________________     Any concerns with Transportation:  YES / NO    If yes, please explain:  ___________________________________________________________________________    ______________________________________________________________________________________________    Any  Psychiatric and/or Psychosocial concerns:  YES / NO     If yes, please explain: ___________________________________________________________________________    ______________________________________________________________________________________________      PLEASE RETURN TO: FAX: [phone]     Thank you for collaborating with us in the care of this patient.           [address]  ?  [place] [address]  ?  phone [phone]  ?  fax [phone]  ?  [email or web address]  Confidentiality notice: The accompanying facsimile is intended solely for the use of the recipient designated above. Document(s) transmitted herewith may contain information that is confidential and privileged. Delivery, distribution or dissemination of this communication other than to the intended recipient is strictly prohibited. If you have received this facsimile in error, please notify us immediately by telephone.

## 2020-11-05 NOTE — PROGRESS NOTES
Transplant Surgery  Kidney Transplant Recipient Evaluation    Referring Physician: Sayda Hi  Current Nephrologist: Sayda Hi    Subjective:     Reason for Visit: evaluate transplant candidacy    History of Present Illness: Isidro White is a 50 y.o. year old male undergoing transplant evaluation.    Dialysis History: Isidro is on hemodialysis.      Transplant History: N/A    Etiology of Renal Disease: Hypertensive Nephrosclerosis (based on medical records from referral).    Review of Systems   Constitutional: Positive for fatigue.   HENT: Negative for drooling, postnasal drip and sore throat.    Eyes: Negative for discharge and itching.   Respiratory: Negative for choking and stridor.    Gastrointestinal: Negative for rectal pain.   Endocrine: Negative for polydipsia.   Genitourinary: Negative for enuresis and genital sores.   Musculoskeletal: Negative for back pain, neck pain and neck stiffness.   Allergic/Immunologic: Negative for immunocompromised state.   Neurological: Negative for facial asymmetry and numbness.   Hematological: Negative for adenopathy.   Psychiatric/Behavioral: Negative for behavioral problems, self-injury and suicidal ideas.       Objective:     Physical Exam:  Constitutional:   Vitals reviewed: yes   Well-nourished and well-groomed: yes  Eyes:   Sclerae icteric: no   Extraocular movements intact: yes  GI:    Bowel sounds normal: yes   Tenderness: no    If yes, quadrant/location: not applicable   Palpable masses: no    If yes, quadrant/location: not applicable   Hepatosplenomegaly: no   Ascites: no   Hernia: no    If yes, type/location: not applicable   Surgical scars: yes    If yes, type/location: upper abdomne - chest tube sites from previous sternotomy for aortic valve replacemnt  Resp:   Effort normal: yes   Breath sounds normal: yes    CV:   Regular rate and rhythm: yes   Heart sounds normal: yes   Femoral pulses normal: yes   Extremities edematous: no  Skin:   Rashes or lesions  present: no    If yes, describe:not applicable   Jaundice:: no    Musculoskeletal:   Gait normal: yes   Strength normal: yes  Psych:   Oriented to person, place, and time: yes   Affect and mood normal: yes    Additional comments: not applicable    Counseling: We provided Isidro White Jr. with a group education session today.  We discussed kidney transplantation at length with him, including risks, potential complications, and alternatives in the management of his renal failure.  The discussion included complications related to anesthesia, bleeding, infection, primary nonfunction, and ATN.  I discussed the typical postoperative course, length of hospitalization, the need for long-term immunosuppression, and the need for long-term routine follow-up.  I discussed living-donor and -donor transplantation and the relative advantages and disadvantages of each.  I also discussed average waiting times for both living donation and  donation.  I discussed national and center-specific survival rates.  I also mentioned the potential benefit of multicenter listing to candidates listed with centers within more than one organ procurement organization.  All questions were answered.    Final determination of transplant candidacy will be made once evaluation is complete and reviewed by the Kidney & Kidney/Pancreas Selection Committee.         Transplant Surgery - Candidacy   Assessment/Plan:   Isidro White Jr. has end stage renal disease (ESRD) on dialysis. I see no surgical contraindication to placing a kidney transplant. Based on available information, Isidro White Jr. is a high-risk kidney transplant candidate.     Fito is on active anticoagulation for previous aortic valve surgery - he will need anticoagulation at the time of transplant and post transplant - this may increase his risk of complications.    Alexandre Stiles MD

## 2020-11-05 NOTE — LETTER
November 6, 2020        Sayda Hi  [address]  [place] [address]  Phone: [phone]  Fax: [phone]             [facility]- Transplant 1st Fl  [address]  [facility] [address]  Phone: [phone]   Patient: Isidro White Jr.   MR Number: [id number]   YOB: 1970   Date of Visit: 11/5/2020       Dear Dr. Sadya Hi    Thank you for referring Isidro White to me for evaluation. Attached you will find relevant portions of my assessment and plan of care.    If you have questions, please do not hesitate to call me. I look forward to following Isidro White along with you.    Sincerely,    Sloane Lui, NP    Enclosure    If you would like to receive this communication electronically, please contact [email or web address] or [phone] to request [employer] access.    [employer] is a tool which provides read-only access to select patient information with whom you have a relationship. Its easy to use and provides real time access to review your patients record including encounter summaries, notes, results, and demographic information.    If you feel you have received this communication in error or would no longer like to receive these types of communications, please e-mail [email or web address]

## 2020-11-05 NOTE — TELEPHONE ENCOUNTER
Reviewed pt transplant labs.  Notified dialysis unit dietitian of the following abnormal labs via fax and requested their most recent nutrition note on this pt.  Once this note is received it will be scanned into pt's chart.    Chol 248    Phos 4.7

## 2020-11-05 NOTE — PROGRESS NOTES
INITIAL PATIENT EDUCATION NOTE    Mr. Isidro White Jr. was seen in pre-kidney transplant clinic for evaluation for kidney, kidney/pancreas or pancreas only transplant.  The patient attended a an individual video education session that discussed/reviewed the following aspects of transplantation: evaluation and selection committee process, UNOS waitlist management/multiple listings, types of organs offered (KDPI < 85%, KDPI > 85%, PHS increased risk, DCD, HCV+, HIV+ for HIV+ recipients and enbloc/dual), financial aspects, surgical procedures, dietary instruction pre- and post-transplant, health maintenance pre- and post-transplant, post-transplant hospitalization and outpatient follow-up, potential to participate in a research protocol, and medication management and side effects.  A question and answer session was provided after the presentation.    The patient was seen by all members of the multi-disciplinary team to include: Nephrologist/PA, Surgeon, , Transplant Coordinator, , Pharmacist and Dietician (if applicable).    The patient reviewed and signed all consents for evaluation which were witnessed and sent to scanning into the [facility] chart.    The patient was given an education book and plan for further evaluation based on his individual assessment.      The patient was encouraged to call with any questions or concerns.

## 2020-11-05 NOTE — PROGRESS NOTES
PHARM.D. PRE-TRANSPLANT NOTE:    This patient's medication therapy was evaluated as part of his pre-transplant evaluation.      The following general pharmacologic concerns were noted: Patient currently on warfarin (aortic mechanical valve replacement).    The following concerns for post-operative pain management were noted: None    The following pharmacologic concerns related to HCV therapy were noted: None      This patient's medication profile was reviewed for considerations for DAA Hepatitis C therapy:    [X]  No current inducers of CYP 3A4 or PGP  [X]  No amiodarone on this patient's EMR profile in the last 24 months  [YES]  No past or current atrial fibrillation on this patient's EMR profile       Current Outpatient Medications   Medication Sig Dispense Refill    albuterol (VENTOLIN HFA) 90 mcg/actuation inhaler Inhale 2 puffs into the lungs every 6 (six) hours as needed for Wheezing or Shortness of Breath. Rescue 18 g 3    amLODIPine (NORVASC) 10 MG tablet take 1 tablet by mouth every day 30 tablet 10    ascorbic acid, vitamin C, (VITAMIN C) 500 MG tablet Take 1 tablet (500 mg total) by mouth 2 (two) times daily.      b complex vitamins tablet Take 1 tablet by mouth once daily.      calcium acetate,phosphat bind, (PHOSLO) 667 mg capsule Take 2 capsules (1,334 mg total) by mouth 3 (three) times daily with meals. 180 capsule 12    diltiaZEM (CARDIZEM CD) 360 MG 24 hr capsule Take 1 capsule (360 mg total) by mouth once daily. 90 capsule 1    epoetin vikki (PROCRIT) 10,000 unit/mL injection Inject 1 mL (10,000 Units total) into the skin every Mon, Wed, Fri. To be given with HD      ferrous sulfate 324 mg (65 mg iron) TbEC Take 325 mg by mouth once daily.      fish oil-omega-3 fatty acids 300-1,000 mg capsule Take 2 g by mouth once daily.      hydrALAZINE (APRESOLINE) 100 MG tablet TAKE ONE TABLET BY MOUTH THREE TIMES DAILY INCLUDING DIALYSIS DAYS 90 tablet 5    hydrOXYzine pamoate (VISTARIL) 25 MG Cap  take 1 capsule by mouth daily as needed for itching 30 capsule 5    labetaloL (NORMODYNE) 200 MG tablet Take 1 tablet (200 mg total) by mouth 3 (three) times daily. 90 tablet 3    lisinopriL (PRINIVIL,ZESTRIL) 40 MG tablet Take 1 tablet (40 mg total) by mouth once daily. 30 tablet 6    multivitamin with minerals tablet Take 1 tablet by mouth once daily.      pantoprazole (PROTONIX) 40 MG tablet TAKE ONE TABLET BY MOUTH EVERY DAY 30 tablet 8    ETHAN-RACQUEL RX 1- mg-mg-mcg Tab TAKE ONE TABLET BY MOUTH EVERY DAY 90 tablet 1    tadalafiL (CIALIS) 20 MG Tab Take one tablet po QOD prn sexual activity. 10 tablet 11    tenofovir (VIREAD) 300 mg Tab Take 1 tablet (300 mg total) by mouth once a week. 12 tablet 3    vitamin D (VITAMIN D3) 1000 units Tab Take 1,000 Units by mouth 3 (three) times a week.      warfarin (COUMADIN) 7.5 MG tablet Take 2 tablets by mouth on Tuesday, Thursdays, and Saturday; and 1 tablet on all other days of the week. (Patient taking differently: Take 2 tablets by mouth on Thursdays, Saturdays; and 1 tablet on all other days of the week.) 35 tablet 2    omega-3 fatty acids-vitamin E (FISH OIL) 1,000 mg Cap Take 1 capsule by mouth once daily.  0     No current facility-administered medications for this visit.      Facility-Administered Medications Ordered in Other Visits   Medication Dose Route Frequency Provider Last Rate Last Dose    0.9%  NaCl infusion   Intravenous Continuous Christopher Jane MD        sodium chloride 0.9% flush 10 mL  10 mL Intravenous PRN Christopher Jane MD             Currently the patient is responsible for preparing / administering this patient's medications on a daily basis.  I am available for consultation and can be contacted, as needed by the other members of the Kidney Transplant team.

## 2020-11-06 ENCOUNTER — HOSPITAL ENCOUNTER (EMERGENCY)
Facility: HOSPITAL | Age: 50
Discharge: HOME | End: 2020-11-06
Attending: STUDENT IN AN ORGANIZED HEALTH CARE EDUCATION/TRAINING PROGRAM
Payer: MEDICARE

## 2020-11-06 VITALS
WEIGHT: 229.38 LBS | RESPIRATION RATE: 20 BRPM | HEART RATE: 90 BPM | OXYGEN SATURATION: 98 % | DIASTOLIC BLOOD PRESSURE: 79 MMHG | HEIGHT: 74 IN | SYSTOLIC BLOOD PRESSURE: 176 MMHG | TEMPERATURE: 99 F | BODY MASS INDEX: 29.44 KG/M2

## 2020-11-06 DIAGNOSIS — T81.31XD POSTOPERATIVE DEHISCENCE OF SKIN WOUND, SUBSEQUENT ENCOUNTER: Primary | ICD-10-CM

## 2020-11-06 LAB
CMV IGG SERPL QL IA: REACTIVE
GAMMA INTERFERON BACKGROUND BLD IA-ACNC: 0.09 IU/ML
M TB IFN-G CD4+ BCKGRND COR BLD-ACNC: 0.14 IU/ML
MITOGEN IGNF BCKGRD COR BLD-ACNC: 0.7 IU/ML
STRONGYLOIDES ANTIBODY IGG: NEGATIVE
TB GOLD PLUS: NEGATIVE
TB2 - NIL: 0.13 IU/ML

## 2020-11-06 PROCEDURE — 99283 EMERGENCY DEPT VISIT LOW MDM: CPT | Mod: NTX

## 2020-11-06 PROCEDURE — 87186 SC STD MICRODIL/AGAR DIL: CPT | Mod: NTX

## 2020-11-06 PROCEDURE — 87077 CULTURE AEROBIC IDENTIFY: CPT | Mod: NTX

## 2020-11-06 PROCEDURE — 25000003 PHARM REV CODE 250: Mod: NTX | Performed by: REGISTERED NURSE

## 2020-11-06 PROCEDURE — 87070 CULTURE OTHR SPECIMN AEROBIC: CPT | Mod: NTX

## 2020-11-06 RX ORDER — MUPIROCIN 20 MG/G
1 OINTMENT TOPICAL
Status: COMPLETED | OUTPATIENT
Start: 2020-11-06 | End: 2020-11-06

## 2020-11-06 RX ADMIN — MUPIROCIN 22 G: 20 OINTMENT TOPICAL at 06:11

## 2020-11-06 NOTE — ED PROVIDER NOTES
Encounter Date: 11/6/2020       History     Chief Complaint   Patient presents with    Wound Check     recent dialysis access placed to left upper arm, reports wound at removed staple site, taking vancomycin     The history is provided by the patient.   Pt sent from dialysis from wound check L axillary region. Pt currently getting vancomycin IV at dialysis clinic. He denies any fevers or increased pain or swelling of the site. He says that its taking a long time to heal but looks better than it did. He denies any other complaints at this time. He was seen yesterday at the transplant clinic and advised to f/u with wound care. Pt requesting abx ointment for wound.     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     HD M, W, F    ESRD on dialysis     GERD (gastroesophageal reflux disease)     Hepatitis B     Hyperlipidemia     Hypertension     Obesity     DANIEL (obstructive sleep apnea) 11/12/2019    Secondary hyperparathyroidism of renal origin     Supraventricular tachycardia     atrial tachycardia    Tachycardia     Valvular regurgitation     AI, TR     Past Surgical History:   Procedure Laterality Date    ASD REPAIR      age 8 [facility]    AV Graft Creation Left 03/2017    C[facility]AC [facility]ATION      [facility]     CARDIAC VALVE SURGERY  02/08/2017    22 mm Medtronic AV, 28 mm TV Medtronic annuloplaty ring    COLONOSCOPY N/A 8/27/2019    Procedure: COLONOSCOPY;  Surgeon: Addie John MD;  Location: [facility];  Service: Endoscopy;  Laterality: N/A;  dialysis pt *needs K*    COLONOSCOPY N/A 9/3/2020    Procedure: COLONOSCOPY-need INR;  Surgeon: Jemma Youngblood MD;  Location: [facility];  Service: Endoscopy;   Laterality: N/A;    ESOPHAGOGASTRODUODENOSCOPY N/A 8/27/2019    Procedure: EGD (ESOPHAGOGASTRODUODENOSCOPY);  Surgeon: Addie John MD;  Location: [facility] ENDO;  Service: Endoscopy;  Laterality: N/A;    RADIOFREQUENCY ABLATION  03/13/2017    Atrial tachycardia    REMOVAL OF GRAFT Left 7/2/2020    Procedure: REMOVAL, GRAFT;  Surgeon: Sascha Sidhu MD;  Location: [facility] OR;  Service: Peripheral Vascular;  Laterality: Left;     Family History   Problem Relation Age of Onset    Leukemia Mother     Cancer Mother     Heart attack Father         massive MI at age 67    No Known Problems Sister     No Known Problems Brother     No Known Problems Sister     No Known Problems Sister     Heart failure Paternal Grandmother     Diabetes Paternal Aunt     Hypertension Paternal Aunt     Leukemia Paternal Aunt         CLL    Suicide Paternal Uncle     Anesthesia problems Paternal Uncle     Diabetes Paternal Aunt     Stroke Paternal Aunt     Valvular heart disease Maternal Aunt     Kidney disease Neg Hx     Colon cancer Neg Hx      Social History     Tobacco Use    Smoking status: Never Smoker    Smokeless tobacco: Never Used   Substance Use Topics    Alcohol use: No     Frequency: Never     Comment: quit in 2016; does have heavy drinking history; started drinking at age 13; was drinking a 12 pack over the weekend and two 24 ounces of beer a day during the week along with a pint of hard alcohol    Drug use: No     Review of Systems   Constitutional: Negative for fever.   HENT: Negative for sore throat.    Respiratory: Negative for shortness of breath.    Cardiovascular: Negative for chest pain.   Gastrointestinal: Negative for nausea.   Genitourinary: Negative for dysuria.   Musculoskeletal: Negative for back pain.   Skin: Negative for rash.        + Wound L upper arm/ axilla   Neurological: Negative for weakness.   Hematological: Does not bruise/bleed easily.   All other systems reviewed and are  negative.      Physical Exam     Initial Vitals [11/06/20 1638]   BP Pulse Resp Temp SpO2   (!) 176/79 90 20 98.8 °F (37.1 °C) 98 %      MAP       --         Physical Exam    Constitutional: He appears well-developed and well-nourished. No distress.   HENT:   Head: Normocephalic and atraumatic.   Nose: Nose normal.   Mouth/Throat: Uvula is midline and oropharynx is clear and moist.   Eyes: Conjunctivae and EOM are normal. Pupils are equal, round, and reactive to light.   Neck: Normal range of motion. Neck supple.   Cardiovascular: Normal rate and regular rhythm.   Pulmonary/Chest: Effort normal and breath sounds normal. No respiratory distress. He has no decreased breath sounds. He has no wheezes. He has no rales.   Abdominal: Soft. Normal appearance and bowel sounds are normal. There is no abdominal tenderness.   Musculoskeletal: Normal range of motion.   Neurological: He is alert and oriented to person, place, and time. He has normal strength. GCS eye subscore is 4. GCS verbal subscore is 5. GCS motor subscore is 6.   Skin: Skin is warm and dry. Capillary refill takes less than 2 seconds. No rash noted.   Healing wound to L axillary region, no increased swelling or erythema surrounding the wound, no drainage noted- see image   Psychiatric: He has a normal mood and affect. His speech is normal and behavior is normal.             ED Course   Procedures  Labs Reviewed   CULTURE, AEROBIC  (SPECIFY SOURCE)          Imaging Results    None           6:03 PM: Viewed image of wound from yesterday and it appears to have improved since. Will collect wound culture and have patient f/u with PCP. Pt requesting abx cream so will send home with bactroban    I discussed with patient and/or family/caretaker that evaluation in the ED does not suggest any emergent or life threatening medical conditions requiring immediate intervention beyond what was provided in the ED, and I believe patient is safe for discharge.  Regardless, an  unremarkable evaluation in the ED does not preclude the development or presence of a serious of life threatening condition. As such, patient was instructed to return immediately for any worsening or change in current symptoms.                               Clinical Impression:     ICD-10-CM ICD-9-CM   1. Postoperative dehiscence of skin wound, subsequent encounter  T81.31XD V58.89     998.32                          ED Disposition Condition    Discharge Stable        ED Prescriptions     None        Follow-up Information     Follow up With Specialties Details Why Contact Info    [facility] -  Emergency Medicine  If symptoms worsen [id number] [address] [facility] [id number] [phone]                                       Sascha Knott Jr., FNP  11/06/20 2027

## 2020-11-07 LAB
HBV SURFACE AB SER QL IA: NEGATIVE
HBV SURFACE AB SERPL IA-ACNC: <3 MIU/ML

## 2020-11-10 LAB
VARICELLA INTERPRETATION: POSITIVE
VARICELLA ZOSTER IGG: 4.13 ISR (ref 0–0.9)

## 2020-11-11 LAB — BACTERIA SPEC AEROBE CULT: ABNORMAL

## 2020-11-12 ENCOUNTER — HOSPITAL ENCOUNTER (OUTPATIENT)
Dept: RADIOLOGY | Facility: HOSPITAL | Age: 50
Discharge: HOME | End: 2020-11-12
Attending: INTERNAL MEDICINE
Payer: MEDICARE

## 2020-11-12 ENCOUNTER — ANTI-COAG VISIT (OUTPATIENT)
Dept: CARDIOLOGY | Facility: CLINIC | Age: 50
End: 2020-11-12
Payer: MEDICARE

## 2020-11-12 DIAGNOSIS — I48.91 ATRIAL FIBRILLATION, UNSPECIFIED TYPE: ICD-10-CM

## 2020-11-12 DIAGNOSIS — Z95.2 HISTORY OF MECHANICAL AORTIC VALVE REPLACEMENT: ICD-10-CM

## 2020-11-12 DIAGNOSIS — R91.1 PULMONARY NODULE, LEFT: ICD-10-CM

## 2020-11-12 DIAGNOSIS — Z79.01 LONG TERM (CURRENT) USE OF ANTICOAGULANTS: Primary | ICD-10-CM

## 2020-11-12 LAB — INR PPP: 2.3 (ref 2–3)

## 2020-11-12 PROCEDURE — 71250 CT THORAX DX C-: CPT | Mod: TC,TXP

## 2020-11-12 PROCEDURE — 93793 PR ANTICOAGULANT MGMT FOR PT TAKING WARFARIN: ICD-10-PCS | Mod: ,,,

## 2020-11-12 PROCEDURE — 93793 ANTICOAG MGMT PT WARFARIN: CPT | Mod: ,,,

## 2020-11-12 PROCEDURE — 85610 PROTHROMBIN TIME: CPT | Mod: PBBFAC

## 2020-11-12 NOTE — PROGRESS NOTES
Chart reviewed, agree with LPN plan.      
Patient's INR is therapeutic at 2.3.  Previous instructions were verified and followed.  No other changes reported.  Instructions given to continue current dose of warfarin 15 mg every Thursday, Saturday; and 7.5 mg on all other days.  Recheck in 1 week.  Maintain a consistent diet.  Dose calendar given.  Patient verbalized understanding.    
Female

## 2020-11-13 NOTE — PROGRESS NOTES
Transplant Recipient Adult Psychosocial Assessment    Isidro White  [address]  [address]  [place] [address]  Telephone Information:   Mobile [phone]   Home  [phone] (home)  Work  There is no work phone number on file.  E-mail  No e-mail address on record    Sex: male  YOB: 1970  Age: 50 y.o.    Encounter Date: 2020  [place] Citizen: yes  Primary Language: English   Needed: no   Transportation:   Pt reports he drives and has own truck    Emergency Contact:  Name: Marizol White  Relationship: aun   Address: [address]  Phone Numbers:  [phone] (mobile)    Name: Maggie Hernandez  Relationship: sister   Address: [address]  Phone Numbers:  [phone] (mobile)      Family/Social Support:   Number of dependents/:  Roxane Mg, Mely, Ty, and twins Cecil' and Saturnino'. All will be cared for by their mothers for transplant.  Marital history: Pt reports never being . Pt reports no current significant other.  Other family dynamics: Pt reports 3 adult children: Isidro III, Tricia, and Hellen; aunt: Marizol; and brother: Maykel as supportive family members. Pt reports both parents are .    Household Composition:  Name: Isidro White Jr.  Age: 50  Relationship: patient  Does person drive? yes    Name: Roxanehi Mg  (Pt reports that he has joint custody of her every other weekend)  Age: 9  Relationship: daughter  Does person drive? no    Do you and your caregivers have access to reliable transportation? yes  PRIMARY CAREGIVER: Marizol White (aunt, age 75), will be primary caregiver, phone number [phone].     provided in-depth information to patient and caregiver regarding pre- and post-transplant caregiver role.   strongly encourages patient and caregiver to have concrete plan regarding post-transplant care giving, including back-up caregiver(s) to ensure care giving needs are met as needed.    Patient and  "Caregiver states understanding all aspects of caregiver role/commitment and is able/willing/committed to being caregiver to the fullest extent necessary.    Patient and Caregiver verbalizes understanding of the education provided today and caregiver responsibilities.         remains available. Patient and Caregiver agree to contact  in a timely manner if concerns arise.      Able to take time off work without financial concerns: yes.     Additional Significant Others who will Assist with Transplant:  Name: Estrella Miller  Age: 41  City: [place] State: [place]  Relationship: sister  Does person drive? yes    Name: Ana Florian  Age: 52  Phone: [phone]  City: [place] State: [place]  Relationship: friend  Does person drive? yes    Living Will: no  Healthcare Power of : yes Maggie Hernandez, sister,   Advance Directives on file: <<no information> per medical record.  Verbally reviewed LW/HCPA information.   provided patient with copy of LW/HCPA documents and provided education on completion of forms.    Living Donors: Yes.  Name: "Mr. Waters" (friend). Education and resource information given to patient. SW explained to patient and friend that both recipients and donors require separate caregivers. Patient and Caregiver verbalized understanding and agreement.     Highest Education Level: High School (9-12) or GED  Reading Ability: 10th grade  Reports difficulty with: N/A  Learns Best By:  Reading about, seeing and doing new task until proficient     Status: no  VA Benefits: no     Working for Income: No  If no, reason not working: Disability  Patient is disabled.  Prior to disability, patient  was employed as an ..    Spouse/Significant Other Employment: Pt reports no current significant other.    Disabled: yes: date disability began: 2017, due to: ESRD and CHF.    Monthly Income:   Disability: $1180    Able to afford all costs now and if " transplanted, including medications: yes. Pt reports that his aunt: Marizol and his friend: Ana are willing to assist. Ana present in assessment and agrees to same. Pt reports that he may be able to return to work or establish other disabilities with social security to maintain his disability income.    Patient and Caregiver verbalizes understanding of personal responsibilities related to transplant costs and the importance of having a financial plan to ensure that patients transplant costs are fully covered.       provided fundraising information/education. Patient and Caregiververbalizes understanding.   remains available.    Insurance:   Payor/Plan Subscr  Sex Relation Sub. Ins. ID Effective Group Num   1. MEDICARE - ME* RADHA PONCE RA* 1970 Male Self [id number]                                    [address]   2. BLUE CROSS BL* RADHA PONCE RA* 1970 Male  [id number]                                   [address] [address]     Primary Insurance (for UNOS reporting): Public Insurance - Medicare FFS (Fee For Service)  Secondary Insurance (for UNOS reporting): Private Insurance Pt reports supplement is being paid for by the [employer]. SW provided education and information regarding this policy post transplant. Patient and Caregiver verbalized understanding and agreement.      Patient and Caregiver verbalizes clear understanding that patient may experience difficulty obtaining and/or be denied insurance coverage post-surgery. This includes and is not limited to disability insurance, life insurance, health insurance, burial insurance, long term care insurance, and other insurances.      Patient and Caregiver also reports understanding that future health concerns related to or unrelated to transplantation may not be covered by patient's insurance.  Resources and information provided and reviewed.     Patient and Caregiver provides verbal  permission to release any necessary information to outside resources for patient care and discharge planning.  Resources and information provided are reviewed.        Dialysis Adherence: Patient and Caregiver reports being on hemodialysis in center, attending all dialysis appointments, and staying for the entire course of treatment. [facility] was not able to complete an adherence check at this time and will complete one at a later date. [facility] faxed an adherence form (see Letters section) and is awaiting a fax back.     Infusion Service: patient utilizing? no. Pt reports that he used IV meds 2 months ago, but none currently  Home Health: patient utilizing? no  DME: pt denies   Pulmonary/Cardiac Rehab: pt denies  ADLS:  Pt reports no difficulties with driving, walking, bathing, cooking, housekeeping, eating, shopping, and taking medication.    Adherence:   Pt reports is highly compliant with all medical appointments and instructions. Adherence education and counseling provided.     Per History Section:  Past Medical History:   Diagnosis Date    Anemia     Aortic valve stenosis     s/p mechanical AVR    Atrial fibrillation     Atrial flutter     Cardiomyopathy     CHF (congestive heart failure)     Drug-induced erectile dysfunction     ESRD due to hypertension     HD M, W, F    ESRD on dialysis     GERD (gastroesophageal reflux disease)     Hepatitis B     Hyperlipidemia     Hypertension     Obesity     DANIEL (obstructive sleep apnea) 11/12/2019    Secondary hyperparathyroidism of renal origin     Supraventricular tachycardia     atrial tachycardia    Tachycardia     Valvular regurgitation     AI, TR     Social History     Tobacco Use    Smoking status: Never Smoker    Smokeless tobacco: Never Used   Substance Use Topics    Alcohol use: No     Frequency: Never     Comment: quit in 2016; does have heavy drinking history; started drinking at age 13; was drinking a 12 pack over the weekend and two 24 ounces of  "beer a day during the week along with a pint of hard alcohol     Social History     Substance and Sexual Activity   Drug Use No     Social History     Substance and Sexual Activity   Sexual Activity Not on file       Per Today's Psychosocial:  Tobacco: none, patient denies any use.    Alcohol: Pt reports that he was previously a heavy drinker and reports that he started drinking at age 13 and reports that he was drinking a 12-pack over the weekend and 2- 24 oz beers daily during the week. Pt states, "I looked at my life and I didn't want to drink anymore". Pt reports that it was a gradual process of quitting but he did not do any formal rehab. Pt reports that it took about 6 weeks to detox, but soon after that he passed out and ended up in the hospital. Pt reports that when he had all of his medical problems start. Pt states, "My friend said if I probably kept drinking, I wouldn't have gotten sick". SW explained that that line of reasoning was unlikely to be true and that pt was probably already sick from all the drinking, but wasn't able to process that fact because of all the drinking. Patient verbalized understanding and agreement. Pt reports that plans to remain abstinent.     Illicit Drugs/Non-prescribed Medications: none, patient denies any use.    Patient and Caregiver states clear understanding of the potential impact of substance use as it relates to transplant candidacy and is aware of possible random substance screening.  Substance abstinence/cessation counseling, education and resources provided and reviewed.     Arrests/DWI/Treatment/Rehab: yes DWI 10 years ago in [place] and jailed for 2 months and let go. No AA or alcohol rehab stay.    Psychiatric History:    Mental Health: Pt reports no history of or current mental health issues or concerns.   Psychiatrist/Counselor: Pt denies seeing a mental health professional and reports being open to seeing the psych department for talk therapy if " necessary.  Medications:  Pt denies taking medications for mental health reasons.  Suicide/Homicide Issues: Pt denies any history of or current suicidal or homicidal ideations.   Safety at home: Pt reports no current or history of safety concerns in household; including mental, physical, verbal, or sexual abuse.    Knowledge: Patient and Caregiver states having clear understanding and realistic expectations regarding the potential risks and potential benefits of organ transplantation and organ donation and agrees to discuss with health care team members and support system members, as well as to utilize available resources and express questions and/or concerns in order to further facilitate the pt informed decision-making.  Resources and information provided and reviewed.    Patient and Caregiver is aware of [facility]'s affiliation and/or partnership with agencies in home health care, LTAC, SNF, [facility] and other hospitals and clinics.    Understanding: Patient and Caregiver reports having a clear understanding of the many lifetime commitments involved with being a transplant recipient, including costs, compliance, medications, lab work, procedures, appointments, concrete and financial planning, preparedness, timely and appropriate communication of concerns, abstinence (ETOH, tobacco, illicit non-prescribed drugs), adherence to all health care team recommendations, support system and caregiver involvement, appropriate and timely resource utilization and follow-through, mental health counseling as needed/recommended, and patient and caregiver responsibilities.  Social Service Handbook, resources and detailed educational information provided and reviewed.  Educational information provided.    Patient and Caregiver also reports current and expected compliance with health care regime and states having a clear understanding of the importance of compliance.      Patient and Caregiver reports a clear understanding that risks and  benefits may be involved with organ transplantation and with organ donation.       Patient and Caregiver also reports clear understanding that psychosocial risk factors may affect patient, and include but are not limited to feelings of depression, generalized anxiety, anxiety regarding dependence on others, post traumatic stress disorder, feelings of guilt and other emotional and/or mental concerns, and/or exacerbation of existing mental health concerns.  Detailed resources provided and discussed.      Patient and Caregiver agrees to access appropriate resources in a timely manner as needed and/or as recommended, and to communicate concerns appropriately.  Patient and Caregiver also reports a clear understanding of treatment options available.     Patient and Caregiver received education in a group setting.   reviewed education, provided additional information, and answered questions.    Feelings or Concerns: Patient and Caregiver did not express any concerns at this time. Patient reports high motivation to pursue transplant at this time.    Coping: Pt reports coping well with the transplant process at this time and reports having cookouts, going to the gym, taking a ride, and hanging out with family and friends as ways to cope.    Goals: Pt reports getting back healthy and strong, returning to work, getting , and starting a new life as goals for post transplant. Patient referred to Vocational Rehabilitation.    Interview Behavior: Patient and Caregiver presents as alert and oriented x 4, pleasant, good eye contact, well groomed, recall good, concentration/judgement good, average intelligence, calm, communicative, cooperative and asking and answering questions appropriately. Pt presents with Ana Florian pt's friend at pt's request.         Transplant Social Work - Candidacy  Assessment/Plan:     Psychosocial Suitability: Patient presents as a suitable candidate for kidney transplant at  this time. Based on psychosocial risk factors, patient presents as low risk, due to suitable caregiver plan in place, adequate financial plan and history of suitable dialysis adhernece. Pt does have a significant history of alcohol use, however reports no use since 2016.    Recommendations/Additional Comments: SW recommends that pt conduct fundraising to assist pt with pay for cost of medications, food, gas, and other transplant related needs. SW recommends that pt remain aware of potential mental health concerns and contact the team if any concerns arise. SW recommends that pt remain abstinent from tobacco, ETOH, and drug use. SW supports pt's continued adherence. SW remains available to answer any questions or concerns that arise as the pt moves through the transplant process.     Carlotta Foreman, SYEDAW

## 2020-11-19 ENCOUNTER — ANTI-COAG VISIT (OUTPATIENT)
Dept: CARDIOLOGY | Facility: CLINIC | Age: 50
End: 2020-11-19
Payer: MEDICARE

## 2020-11-19 DIAGNOSIS — I48.91 ATRIAL FIBRILLATION, UNSPECIFIED TYPE: ICD-10-CM

## 2020-11-19 DIAGNOSIS — Z79.01 LONG TERM (CURRENT) USE OF ANTICOAGULANTS: Primary | ICD-10-CM

## 2020-11-19 LAB — INR PPP: 2.1 (ref 2–3)

## 2020-11-19 PROCEDURE — 85610 PROTHROMBIN TIME: CPT | Mod: PBBFAC

## 2020-11-19 PROCEDURE — 93793 PR ANTICOAGULANT MGMT FOR PT TAKING WARFARIN: ICD-10-PCS | Mod: ,,,

## 2020-11-19 PROCEDURE — 93793 ANTICOAG MGMT PT WARFARIN: CPT | Mod: ,,,

## 2020-11-19 NOTE — PROGRESS NOTES
"Patient's INR is therapeutic at 2.1.  Reports no recent changes.  Stated, "will be starting an antibiotic (?) soon and will contact the CC, once prescribed".  Instructions given:  Maintain scheduled dose of warfarin 15 mg every Thursday, Saturday; and 7.5 mg on all other days.  Recheck in 2 weeks - pending the start of antibiotic.  Dose calendar given.  Patient verbalized understanding.    "

## 2020-11-20 LAB
B CELL RESULTS - XM AUTO: NEGATIVE
B MCS AVERAGE - XM AUTO: -14.5
FXMAU TESTING DATE: NORMAL
HLA AB QL: NORMAL
HLA AB SERPL: POSITIVE
SCRFL TESTING DATE: NORMAL
SERUM COLLECTION DT - XM AUTO: NORMAL
SERUM COLLECTION DT: NORMAL
T CELL RESULTS - XM AUTO: NEGATIVE
T MCS AVERAGE - XM AUTO: 8.2

## 2020-11-27 LAB
CLASS I ANTIBODIES - LUMINEX: NORMAL
CLASS I ANTIBODY COMMENTS - LUMINEX: NORMAL
CLASS II ANTIBODIES - LUMINEX: NEGATIVE
CPRA %: 99
SERUM COLLECTION DT - LUMINEX CLASS I: NORMAL
SERUM COLLECTION DT - LUMINEX CLASS II: NORMAL
SPCL1 TESTING DATE: NORMAL
SPCL2 TESTING DATE: NORMAL
SPCLU TESTING DATE: NORMAL

## 2020-12-01 ENCOUNTER — TELEPHONE (OUTPATIENT)
Dept: TRANSPLANT | Facility: CLINIC | Age: 50
End: 2020-12-01

## 2020-12-01 NOTE — TELEPHONE ENCOUNTER
Spoke with  at patient's dialysis center regarding obtaining a copy of patient's wound care clearance letter. . She reports his dialysis nurse is not in today, but she will have her call me back tomorrow to follow up on clearance letter.

## 2020-12-01 NOTE — TELEPHONE ENCOUNTER
Patient calling to report he has been cleared from his wound care doctor, and is able to proceed with evaluation of kidney transplant. States, his dialysis center has his clearance letter. Instructed I will contact his dialysis center to obtain a copy of his clearance letter, as it relates to his previous open wound. His chart will be presented to committee on Friday, 12/4/2020 to obtain a decision of if we can continue with kidney transplant evaluation. Patient reports understanding. Denies other questions or concerns at this time.     ----- Message from Sid Han sent at 12/1/2020  9:50 AM CST -----      Pt calling to speak with coord. Declined to say why              [phone] (M)

## 2020-12-03 ENCOUNTER — ANTI-COAG VISIT (OUTPATIENT)
Dept: CARDIOLOGY | Facility: CLINIC | Age: 50
End: 2020-12-03
Payer: MEDICARE

## 2020-12-03 DIAGNOSIS — Z79.01 LONG TERM (CURRENT) USE OF ANTICOAGULANTS: Primary | ICD-10-CM

## 2020-12-03 DIAGNOSIS — Z95.2 HISTORY OF MECHANICAL AORTIC VALVE REPLACEMENT: ICD-10-CM

## 2020-12-03 DIAGNOSIS — I48.91 ATRIAL FIBRILLATION, UNSPECIFIED TYPE: ICD-10-CM

## 2020-12-03 LAB — INR PPP: 1.6 (ref 2–3)

## 2020-12-03 PROCEDURE — 85610 PROTHROMBIN TIME: CPT | Mod: PBBFAC

## 2020-12-03 PROCEDURE — 93793 ANTICOAG MGMT PT WARFARIN: CPT | Mod: ,,,

## 2020-12-03 PROCEDURE — 93793 PR ANTICOAGULANT MGMT FOR PT TAKING WARFARIN: ICD-10-PCS | Mod: ,,,

## 2020-12-03 NOTE — PROGRESS NOTES
"Patient's INR is subtherapeutic at 1.6.  Reports no missed doses.  Stated, "had too much salads".  No signs or symptoms reported.  Instructions given:  Will boost today's (Thursday) warfarin dose to 18.75 mg - only, then resume scheduled dose of 15 mg every Thursday, Saturday; and 7.5 mg on all other days.  Advised to maintain a consistent diet to prevent future clotting factors.  Recheck in 2 weeks (Vanna LOPEZ).  Dose calendar given and reviewed with patient.  Patient verbalized understanding.    "

## 2020-12-04 ENCOUNTER — COMMITTEE REVIEW (OUTPATIENT)
Dept: TRANSPLANT | Facility: CLINIC | Age: 50
End: 2020-12-04

## 2020-12-04 NOTE — COMMITTEE REVIEW
Native Organ Dx: Hypertensive Nephrosclerosis      Unable to determine transplant candidacy at this time due to needs updated imaging of wound, once reviewed, may continue transplant evaluation.    Spoke with patient regarding committee's discussion. Reports understanding of discussion. Denies other questions or concerns at this time.     Note written by MEG Perez    ===============================================    I was present at the meeting and attest to the decision of the committee

## 2020-12-07 ENCOUNTER — TELEPHONE (OUTPATIENT)
Dept: TRANSPLANT | Facility: CLINIC | Age: 50
End: 2020-12-07

## 2020-12-07 NOTE — TELEPHONE ENCOUNTER
Attempted to return call. No answer.     ----- Message from Dinorah King sent at 12/7/2020  2:50 PM CST -----  Regarding: FW: PT  Contact: PT    ----- Message -----  From: Shyanne Ellis  Sent: 12/7/2020  11:58 AM CST  To: [facility] Pre-Kidney Transplant Clinical  Subject: PT                                               PT called to speak to his coordinator Bret Regan regarding his arm. Please call back     Callback: [phone]

## 2020-12-07 NOTE — TELEPHONE ENCOUNTER
Spoke with patient regarding needing a picture of healed wound to left axillary. Patient reports he will get his dialysis nurse to help him take a picture of his wound and upload it to his chart. Patient denies other questions or concerns at this time.

## 2020-12-08 ENCOUNTER — TELEPHONE (OUTPATIENT)
Dept: TRANSPLANT | Facility: CLINIC | Age: 50
End: 2020-12-08

## 2020-12-08 NOTE — TELEPHONE ENCOUNTER
Spoke with LEI Merrill at dialysis unit, regarding picture needed of healed wound to patient's left axillary per patient's request. Isis reports she will assist patient in taking the picture and e-mail the picture to me.

## 2020-12-11 ENCOUNTER — COMMITTEE REVIEW (OUTPATIENT)
Dept: TRANSPLANT | Facility: CLINIC | Age: 50
End: 2020-12-11

## 2020-12-11 RX ORDER — HYDROXYZINE PAMOATE 25 MG/1
25 CAPSULE ORAL DAILY
Qty: 30 CAPSULE | Refills: 5 | Status: SHIPPED | OUTPATIENT
Start: 2020-12-11 | End: 2021-07-12 | Stop reason: SDUPTHER

## 2020-12-11 NOTE — COMMITTEE REVIEW
"Native Organ Dx: Hypertensive Nephrosclerosis    Patient discussed at committee due to previous open wound to left axillary. Clearance letter received from patient's wound care physician stating, "On eval today, there is no wound present. Healed scar to left axillary area. No other wounds observed."     Ok for patient to continue with evaluation of kidney transplant per committee.    Spoke to patient regarding committee's decision. Patient reports understanding. Denies other questions or concerns at this time.       Note written by WENDY Regan RN    ===============================================    I was present at the meeting and attest to the decision of the committee  "

## 2020-12-16 ENCOUNTER — TELEPHONE (OUTPATIENT)
Dept: RADIOLOGY | Facility: HOSPITAL | Age: 50
End: 2020-12-16

## 2020-12-17 ENCOUNTER — PROCEDURE VISIT (OUTPATIENT)
Dept: GASTROENTEROLOGY | Facility: CLINIC | Age: 50
End: 2020-12-17
Attending: NURSE PRACTITIONER
Payer: MEDICARE

## 2020-12-17 ENCOUNTER — ANTI-COAG VISIT (OUTPATIENT)
Dept: CARDIOLOGY | Facility: CLINIC | Age: 50
End: 2020-12-17
Payer: MEDICARE

## 2020-12-17 ENCOUNTER — HOSPITAL ENCOUNTER (OUTPATIENT)
Dept: RADIOLOGY | Facility: HOSPITAL | Age: 50
Discharge: HOME | End: 2020-12-17
Attending: NURSE PRACTITIONER
Payer: MEDICARE

## 2020-12-17 VITALS — WEIGHT: 231.69 LBS | BODY MASS INDEX: 29.73 KG/M2 | HEIGHT: 74 IN

## 2020-12-17 DIAGNOSIS — I48.91 ATRIAL FIBRILLATION, UNSPECIFIED TYPE: ICD-10-CM

## 2020-12-17 DIAGNOSIS — Z79.01 LONG TERM (CURRENT) USE OF ANTICOAGULANTS: Primary | ICD-10-CM

## 2020-12-17 DIAGNOSIS — B18.1 CHRONIC VIRAL HEPATITIS B WITHOUT DELTA AGENT AND WITHOUT COMA: ICD-10-CM

## 2020-12-17 LAB — INR PPP: 2.3 (ref 2–3)

## 2020-12-17 PROCEDURE — 91200 LIVER ELASTOGRAPHY: CPT | Mod: PBBFAC,TXP | Performed by: NURSE PRACTITIONER

## 2020-12-17 PROCEDURE — 91200 PR LIVER ELASTOGRAPHY W/OUT IMAG W/INTERP & REPORT: ICD-10-PCS | Mod: 26,S$PBB,NTX, | Performed by: NURSE PRACTITIONER

## 2020-12-17 PROCEDURE — 85610 PROTHROMBIN TIME: CPT | Mod: PBBFAC,TXP

## 2020-12-17 PROCEDURE — 93793 PR ANTICOAGULANT MGMT FOR PT TAKING WARFARIN: ICD-10-PCS | Mod: ,,,

## 2020-12-17 PROCEDURE — 91200 LIVER ELASTOGRAPHY: CPT | Mod: 26,S$PBB,NTX, | Performed by: NURSE PRACTITIONER

## 2020-12-17 PROCEDURE — 76700 US ABDOMEN COMPLETE: ICD-10-PCS | Mod: 26,TXP,, | Performed by: RADIOLOGY

## 2020-12-17 PROCEDURE — 93793 ANTICOAG MGMT PT WARFARIN: CPT | Mod: ,,,

## 2020-12-17 PROCEDURE — 76700 US EXAM ABDOM COMPLETE: CPT | Mod: 26,TXP,, | Performed by: RADIOLOGY

## 2020-12-17 PROCEDURE — 76700 US EXAM ABDOM COMPLETE: CPT | Mod: TC,TXP

## 2020-12-17 NOTE — PROGRESS NOTES
Patient's INR is therapeutic at 2.3.  Patient reports followed previous instructions.  Patient reports no changes.  Instructions given: Continue warfarin 15 mg on Tuesdays and Thursdays; and 10 mg all other days.  Recheck in three weeks.  Patient verbalizes understanding.

## 2020-12-17 NOTE — PROCEDURES
Fibroscan Procedure     Name: Isidro White .  Date of Procedure : 2020   :: VERENA Cerda  Diagnosis: HBV    Probe: M    Fibroscan readin.8 KPa    Fibrosis:F4     CAP readin dB/m    Steatosis: :<S1       Interpretation:   Cirrhosis without significant steatosis

## 2020-12-17 NOTE — PROGRESS NOTES
Chart reviewed, agree with LPN plan.  Dose correction - Pt is taking 15 mg on Thurs/Sat, 7.5 mg all other days.

## 2020-12-18 DIAGNOSIS — B18.1 CHRONIC VIRAL HEPATITIS B WITHOUT DELTA AGENT AND WITHOUT COMA: ICD-10-CM

## 2020-12-21 RX ORDER — TENOFOVIR DISOPROXIL FUMARATE 300 MG/1
300 TABLET, FILM COATED ORAL WEEKLY
Qty: 12 TABLET | Refills: 3 | Status: SHIPPED | OUTPATIENT
Start: 2020-12-21 | End: 2021-12-07

## 2020-12-22 ENCOUNTER — SPECIALTY PHARMACY (OUTPATIENT)
Dept: PHARMACY | Facility: CLINIC | Age: 50
End: 2020-12-22

## 2020-12-23 ENCOUNTER — HOSPITAL ENCOUNTER (OUTPATIENT)
Dept: RADIOLOGY | Facility: HOSPITAL | Age: 50
Discharge: HOME | End: 2020-12-23
Attending: INTERNAL MEDICINE
Payer: MEDICARE

## 2020-12-23 ENCOUNTER — OFFICE VISIT (OUTPATIENT)
Dept: CARDIOLOGY | Facility: CLINIC | Age: 50
End: 2020-12-23
Payer: MEDICARE

## 2020-12-23 ENCOUNTER — TELEPHONE (OUTPATIENT)
Dept: GASTROENTEROLOGY | Facility: CLINIC | Age: 50
End: 2020-12-23

## 2020-12-23 ENCOUNTER — TELEPHONE (OUTPATIENT)
Dept: CARDIOLOGY | Facility: CLINIC | Age: 50
End: 2020-12-23

## 2020-12-23 ENCOUNTER — HOSPITAL ENCOUNTER (OUTPATIENT)
Dept: CARDIOLOGY | Facility: HOSPITAL | Age: 50
Discharge: HOME | End: 2020-12-23
Attending: INTERNAL MEDICINE
Payer: MEDICARE

## 2020-12-23 VITALS
SYSTOLIC BLOOD PRESSURE: 138 MMHG | RESPIRATION RATE: 16 BRPM | DIASTOLIC BLOOD PRESSURE: 70 MMHG | HEART RATE: 81 BPM | BODY MASS INDEX: 29.45 KG/M2 | HEIGHT: 74 IN | WEIGHT: 229.5 LBS | OXYGEN SATURATION: 94 %

## 2020-12-23 DIAGNOSIS — I48.91 ATRIAL FIBRILLATION, UNSPECIFIED TYPE: ICD-10-CM

## 2020-12-23 DIAGNOSIS — Z79.01 ANTICOAGULATED: ICD-10-CM

## 2020-12-23 DIAGNOSIS — I10 ESSENTIAL HYPERTENSION: ICD-10-CM

## 2020-12-23 DIAGNOSIS — Z98.890 HISTORY OF RADIOFREQUENCY ABLATION FOR COMPLEX RIGHT ATRIAL ARRHYTHMIA: ICD-10-CM

## 2020-12-23 DIAGNOSIS — I47.19 ATRIAL TACHYCARDIA: ICD-10-CM

## 2020-12-23 DIAGNOSIS — Z95.2 HISTORY OF MECHANICAL AORTIC VALVE REPLACEMENT: ICD-10-CM

## 2020-12-23 DIAGNOSIS — I35.0 NONRHEUMATIC AORTIC VALVE STENOSIS: ICD-10-CM

## 2020-12-23 DIAGNOSIS — R06.00 DYSPNEA, UNSPECIFIED TYPE: ICD-10-CM

## 2020-12-23 DIAGNOSIS — I27.9 CHRONIC PULMONARY HEART DISEASE: ICD-10-CM

## 2020-12-23 DIAGNOSIS — I47.29 NSVT (NONSUSTAINED VENTRICULAR TACHYCARDIA): ICD-10-CM

## 2020-12-23 DIAGNOSIS — I50.32 CHRONIC DIASTOLIC HEART FAILURE: ICD-10-CM

## 2020-12-23 DIAGNOSIS — Z95.2 S/P AVR (AORTIC VALVE REPLACEMENT): ICD-10-CM

## 2020-12-23 DIAGNOSIS — I27.9 CHRONIC PULMONARY HEART DISEASE: Primary | ICD-10-CM

## 2020-12-23 DIAGNOSIS — I11.0 HYPERTENSIVE CARDIOMEGALY WITH HEART FAILURE: ICD-10-CM

## 2020-12-23 PROCEDURE — 93010 EKG 12-LEAD: ICD-10-PCS | Mod: NTX,,, | Performed by: INTERNAL MEDICINE

## 2020-12-23 PROCEDURE — 93010 ELECTROCARDIOGRAM REPORT: CPT | Mod: NTX,,, | Performed by: INTERNAL MEDICINE

## 2020-12-23 PROCEDURE — 99999 PR PBB SHADOW E&M-EST. PATIENT-LVL V: ICD-10-PCS | Mod: PBBFAC,TXP,, | Performed by: INTERNAL MEDICINE

## 2020-12-23 PROCEDURE — 99215 OFFICE O/P EST HI 40 MIN: CPT | Mod: PBBFAC,TXP,25 | Performed by: INTERNAL MEDICINE

## 2020-12-23 PROCEDURE — 99999 PR PBB SHADOW E&M-EST. PATIENT-LVL V: CPT | Mod: PBBFAC,TXP,, | Performed by: INTERNAL MEDICINE

## 2020-12-23 PROCEDURE — 99215 OFFICE O/P EST HI 40 MIN: CPT | Mod: S$PBB,NTX,, | Performed by: INTERNAL MEDICINE

## 2020-12-23 PROCEDURE — 93005 ELECTROCARDIOGRAM TRACING: CPT | Mod: NTX

## 2020-12-23 PROCEDURE — 71046 X-RAY EXAM CHEST 2 VIEWS: CPT | Mod: TC,NTX

## 2020-12-23 PROCEDURE — 99215 PR OFFICE/OUTPT VISIT, EST, LEVL V, 40-54 MIN: ICD-10-PCS | Mod: S$PBB,NTX,, | Performed by: INTERNAL MEDICINE

## 2020-12-23 PROCEDURE — 71046 XR CHEST PA AND LATERAL: ICD-10-PCS | Mod: 26,NTX,, | Performed by: RADIOLOGY

## 2020-12-23 PROCEDURE — 71046 X-RAY EXAM CHEST 2 VIEWS: CPT | Mod: 26,NTX,, | Performed by: RADIOLOGY

## 2020-12-23 NOTE — TELEPHONE ENCOUNTER
Patient was notified of results. All questions were answered. Pt verbalized understanding. Pt will call back with any other questions or concerns.    ----- Message from Fuad Gudino MD sent at 12/23/2020  1:31 PM CST -----  Please call the patient regarding his abnormal result. Chest X ray seems consistent with fluid due to CHF, unlikely to be pneumonia but if any fevers or chills occur follow up with PCP or urgent care for antibiotics and further workup if needed.

## 2020-12-23 NOTE — PROGRESS NOTES
"Subjective:   Patient ID:  Isidro White Jr. is a 50 y.o. male who presents for cardiac consult of Shortness of Breath      Chest Pain   Associated symptoms include malaise/fatigue and shortness of breath. Pertinent negatives include no palpitations.   His past medical history is significant for CHF.   Congestive Heart Failure  Associated symptoms include shortness of breath. Pertinent negatives include no chest pain or palpitations.   Hypertension  Associated symptoms include malaise/fatigue and shortness of breath. Pertinent negatives include no chest pain or palpitations.   Shortness of Breath  Pertinent negatives include no chest pain.     The patient came in today for cardiac consult of Shortness of Breath    Isidro White Jr. is a 50 y.o. male pt with ASD closure at age 8 ([facility]), HFpEF, s/p AVR with a 22 mm mechanical valve and TV annuloplasty with a 28 mm ring 3/17, HTN, PHTN, SVT, EP performed RFA (3/13/17) and has been on HD - MWF since Feb 8,2016 here for CV follow up.     12/13/18  Holter from 9/4/18 revealed freq PVCs, dilt increased to 240 mg. No palpitations. Has been doing well lately, BP is well controlled. Labwork from HD has been normal. Still makes some urine, says he has some hematuria. Will need annual stress and 2D echo. He is also undergoing workup for HepB will see Dr. Youngblood.     3/15/19  Recent ER eval at [facility] - He presented with chest pain and shortness of breath. He is a dialysis patient has a history of anemia. He states "I believe that the lack of oxygen". Patient has received blood transfusions in the past but not recently. Patient had dialysis yesterday and is a Monday Wednesday Friday dialysis patient. His troponin is mildly elevated. His chest x-ray is relatively clear. We do not see a large effusion and there is no obvious infiltrate at this time. Patient states at times he has chest pain and it feels like he is smothering. The safest plan is to admit the " patient and repeat his cardiac enzymes and consider given the patient a blood transfusion. However the patient is a dialysis patient so we are unable to admit him at this facility. Patient nephrologist is an [facility] and [facility]  Pt Left AMA and now here for follow up.   Pt has SOB usually at night, feels suffocated and can hear himself snore and wakes up. No prior sleep study.      6/18/19  Pt will need EGD/Cscope. Will be bridged with heparin while holding coumadin and followed at coumadin clinic. Pt also needs liver biopsy, he will be ruled out for esophageal varicies. He also had recent admission for PNA, tx with abx. Now feels well, breathing normal. No CP/SOB.     9/19/19  Per recent workup - Fibroscan suggests cirrhosis and he has thrombocytopenia though no other findings of portal hypertension on imaging or EGD. OK to proceed with kidney transplant from hepatology standpoint. For renal transplant process- recent stress and echo are normal.     2/27/20  He was prescibed meds for ED - he was given Cialis 5 mg. Overall feels well, no CP/SOB. He was denied transplant recently but will have further workup next month with stress and echo. He will also need sleep study.   ECG - NSR, LAE, LAD, nonspec changes    6/23/20  ECHO and stress neg in 3/2020. ECHO with grade 2 DD, PA pressure 42 mmHg. Recent INR therapeutic. Sleep study since last visit neg.   Occ decreased energy/fatigue with exertion. He walks 6 labs around the park at times but other times can't do as much. Dry weight is 101 kg. Today he is 103 kg.  He will have cystoscopy in July by Dr. Bass.     7/16/20 - Hosp follow up   He was admitted to Medical Surgical Unit for Arteriovenous graft infection with left graft excision per Vascular Surgery.  IV antibiotics continued.  Heparin gtt noted.  Coumadin continued with pharmacy to dose.  Increased bleeding noted with anticoagulation adjusted.  H/H stable with downward trend  noted. On 7/5/2020, pt H/H declined with PRBCs x 1 unit given.  Pt remained stable and no bleeding from AVG site appreciated. He had NSVT BB and CCB adjusted as well.     INR 1.9 last week. He had to get PRBC 2 days due to HGB 6. He was scheduled to get staples out on Monday by Dr. Sidhu, had swelling and bleeding from AVG. He had a hematoma. He went to HD yesterday AM and was told his HGB was 7.0. He went to BR. His Epo was increased and iron increased.     10/27/20  Still undergoing workup for cirrhosis, unclear etiology. Will have repeat Fibroscan in December and maybe biopsy. INR 3.4 today. He has tried cialis with improvement. Bp stable. He had mild pain and swelling on left arm but improved now. He has been active.     12/23/20  He had PNA 2 years ago and feels like same symptoms. He has been having HAY, cough. No CP. He also has palpitations. He tried to get more fluid off with HD but could not get more due to cramping. 101.1 KG is dry weight.   ECG - NSR, LAD, Nonspec ST T changes     Patient feels no chest pain, no leg swelling, no PND, no palpitation, no dizziness, no syncope, no CNS symptoms.    Patient has fairly good exercise tolerance.     Patient is compliant with medications.    3/10/20 Nuclear Stress    The perfusion scan is free of evidence from myocardial ischemia or injury.    There is a  mild intensity fixed defect in the  wall of the left ventricle secondary to diaphragm attenuation.    There is a  intensity defect in the inferior wall of the left ventricle consistent with the RV insertion site.    Gated perfusion images showed an ejection fraction of 50% at rest and 50% post stress.    There is normal wall motion at rest and post stress.    The EKG portion of this study is negative for ischemia.    3/10/20 ECHO  · Mild concentric left ventricular hypertrophy.  · Moderate left atrial enlargement.  · Normal left ventricular systolic function. The estimated ejection fraction is  60%.  · Grade II (moderate) left ventricular diastolic dysfunction consistent with pseudonormalization.  · Normal right ventricular systolic function.  · There is a mechanical aortic valve present. Mean gradient is 38 mmHg.  · Mild mitral regurgitation.  · Mild tricuspid regurgitation.  · Normal central venous pressure (3 mmHg).  · The estimated PA systolic pressure is 42 mmHg.  · Pulmonary hypertension present.  · Mild mitral stenosis.  · Mild right ventricular enlargement.        9/4/18 HOLTER  TEST DESCRIPTION   PREDOMINANT RHYTHM  1. Sinus rhythm with heart rates varying between 69 and 124 bpm with an average of 85 bpm.   VENTRICULAR ARRHYTHMIAS  1. There were frequent PVCs totalling 1738 and averaging 36 per hour.  There were 9 bigeminal cycles.  There were 3 triplets.   2. There was one (6 beat) run of non-sustained ventricular tachycardia. The rate was 125 bpm.     SUPRA VENTRICULAR ARRHYTHMIAS  1. There were occasional PACs totalling 995 and averaging 20 per hour.  There were 9 triplets.   2. There were 4 (12 beat) runs of non-sustained SVT. The rate was 145 bpm.        Cath 2/17  1. Coronary angiography.      a.     Left main widely patent.      b.     LAD widely patent.      c.     Ramus widely patent and massive in size.      d.     Circumflex widely patent with a small OM 1 and OM 2 branch.      e.     Right coronary widely patent.  2. Hemodynamics:  Right heart catheterization.      a.     Pulmonary capillary wedge pressure 33 at end-expiration.      b.     Pulmonary artery pressure 78/42.      c.     Right ventricular pressure 70/21.      d.     Right atrial pressure 22 with a peak V-wave of 24.      e.     Cardiac output by thermodilution is between 6 and 7       L/minute, by Patricia 6.1 L/minute.    CONCLUSION  1. Normal coronary arteries.  2. Pulmonary hypertension.  3. Severe aortic insufficiency.      Past Medical History:   Diagnosis Date    Anemia     Aortic valve stenosis     s/p mechanical AVR     Atrial fibrillation     Atrial flutter     Cardiomyopathy     CHF (congestive heart failure)     Drug-induced erectile dysfunction     ESRD due to hypertension     HD M, W, F    ESRD on dialysis     GERD (gastroesophageal reflux disease)     Hepatitis B     Hyperlipidemia     Hypertension     Obesity     DANIEL (obstructive sleep apnea) 11/12/2019    Secondary hyperparathyroidism of renal origin     Supraventricular tachycardia     atrial tachycardia    Tachycardia     Valvular regurgitation     AI, TR       Past Surgical History:   Procedure Laterality Date    ASD REPAIR      age 8 [facility]    AV Graft Creation Left 03/2017    [facility]      [facility]     CARDIAC VALVE SURGERY  02/08/2017    22 mm Medtronic AV, 28 mm TV Medtronic annuloplaty ring    COLONOSCOPY N/A 8/27/2019    Procedure: COLONOSCOPY;  Surgeon: Addie John MD;  Location: [facility] ENDO;  Service: Endoscopy;  Laterality: N/A;  dialysis pt *needs K*    COLONOSCOPY N/A 9/3/2020    Procedure: COLONOSCOPY-need INR;  Surgeon: Jemma Youngblood MD;  Location: [facility] ENDO;  Service: Endoscopy;  Laterality: N/A;    ESOPHAGOGASTRODUODENOSCOPY N/A 8/27/2019    Procedure: EGD (ESOPHAGOGASTRODUODENOSCOPY);  Surgeon: Addie John MD;  Location: [facility] ENDO;  Service: Endoscopy;  Laterality: N/A;    RADIOFREQUENCY ABLATION  03/13/2017    Atrial tachycardia    REMOVAL OF GRAFT Left 7/2/2020    Procedure: REMOVAL, GRAFT;  Surgeon: Sascha Sidhu MD;  Location: [facility] OR;  Service: Peripheral Vascular;  Laterality: Left;       Social History     Tobacco Use    Smoking status: Never Smoker    Smokeless tobacco: Never Used   Substance Use Topics    Alcohol use: No     Frequency: Never     Comment: quit in 2016; does have heavy drinking history; started drinking at age 13; was drinking a 12 pack over the weekend and two 24 ounces of beer a day during the week along with a pint of hard alcohol    Drug use: No       Family  History   Problem Relation Age of Onset    Leukemia Mother     Cancer Mother     Heart attack Father         massive MI at age 67    No Known Problems Sister     No Known Problems Brother     No Known Problems Sister     No Known Problems Sister     Heart failure Paternal Grandmother     Diabetes Paternal Aunt     Hypertension Paternal Aunt     Leukemia Paternal Aunt         CLL    Suicide Paternal Uncle     Anesthesia problems Paternal Uncle     Diabetes Paternal Aunt     Stroke Paternal Aunt     Valvular heart disease Maternal Aunt     Kidney disease Neg Hx     Colon cancer Neg Hx        Patient's Medications   New Prescriptions    No medications on file   Previous Medications    ALBUTEROL (VENTOLIN HFA) 90 MCG/ACTUATION INHALER    Inhale 2 puffs into the lungs every 6 (six) hours as needed for Wheezing or Shortness of Breath. Rescue    AMLODIPINE (NORVASC) 10 MG TABLET    take 1 tablet by mouth every day    ASCORBIC ACID, VITAMIN C, (VITAMIN C) 500 MG TABLET    Take 1 tablet (500 mg total) by mouth 2 (two) times daily.    B COMPLEX VITAMINS TABLET    Take 1 tablet by mouth once daily.    CALCIUM ACETATE,PHOSPHAT BIND, (PHOSLO) 667 MG CAPSULE    Take 2 capsules (1,334 mg total) by mouth 3 (three) times daily.    CALCIUM ACETATE,PHOSPHAT BIND, (PHOSLO) 667 MG CAPSULE    Take 2 capsules (1,334 mg total) by mouth 3 (three) times daily with meals.    DILTIAZEM (CARDIZEM CD) 360 MG 24 HR CAPSULE    Take 1 capsule (360 mg total) by mouth once daily.    EPOETIN TORRIE (PROCRIT) 10,000 UNIT/ML INJECTION    Inject 1 mL (10,000 Units total) into the skin every Mon, Wed, Fri. To be given with HD    FERROUS SULFATE 324 MG (65 MG IRON) TBEC    Take 325 mg by mouth once daily.    FISH OIL-OMEGA-3 FATTY ACIDS 300-1,000 MG CAPSULE    Take 2 g by mouth once daily.    HYDRALAZINE (APRESOLINE) 100 MG TABLET    TAKE ONE TABLET BY MOUTH THREE TIMES DAILY INCLUDING DIALYSIS DAYS    HYDROXYZINE HCL (ATARAX) 25 MG TABLET     Take 1 tablet (25 mg total) by mouth daily as needed for itching    HYDROXYZINE PAMOATE (VISTARIL) 25 MG CAP    take 1 capsule by mouth daily as needed for itching    LABETALOL (NORMODYNE) 200 MG TABLET    Take 1 tablet (200 mg total) by mouth 3 (three) times daily.    LEVOFLOXACIN (LEVAQUIN) 500 MG TABLET    take 1 tablet every other day for 14 days    LISINOPRIL (PRINIVIL,ZESTRIL) 40 MG TABLET    Take 1 tablet (40 mg total) by mouth once daily.    MULTIVITAMIN WITH MINERALS TABLET    Take 1 tablet by mouth once daily.    OMEGA-3 FATTY ACIDS-VITAMIN E (FISH OIL) 1,000 MG CAP    Take 1 capsule by mouth once daily.    PANTOPRAZOLE (PROTONIX) 40 MG TABLET    TAKE ONE TABLET BY MOUTH EVERY DAY    ETHAN-RACQUEL RX 1- MG-MG-MCG TAB    TAKE ONE TABLET BY MOUTH EVERY DAY    TADALAFIL (CIALIS) 20 MG TAB    Take one tablet po QOD prn sexual activity.    TENOFOVIR (VIREAD) 300 MG TAB    Take 1 tablet (300 mg total) by mouth once a week.    VITAMIN D (VITAMIN D3) 1000 UNITS TAB    Take 1,000 Units by mouth 3 (three) times a week.    WARFARIN (COUMADIN) 7.5 MG TABLET    Take 2 tablets by mouth on Tuesday, Thursdays, and Saturday; and 1 tablet on all other days of the week.   Modified Medications    No medications on file   Discontinued Medications    No medications on file       Review of Systems   Constitutional: Positive for malaise/fatigue.   HENT: Negative.    Eyes: Negative.    Respiratory: Positive for shortness of breath.    Cardiovascular: Negative for chest pain and palpitations.   Gastrointestinal: Negative.    Genitourinary: Negative for hematuria.   Musculoskeletal: Positive for joint pain.   Skin: Negative.    Neurological: Negative.    Endo/Heme/Allergies: Negative.    Psychiatric/Behavioral: Negative.    All 12 systems otherwise negative.      Wt Readings from Last 3 Encounters:   12/23/20 104.1 kg (229 lb 8 oz)   12/17/20 105.1 kg (231 lb 11.3 oz)   11/06/20 104.1 kg (229 lb 6.2 oz)     Temp Readings from  "Last 3 Encounters:   11/06/20 98.8 °F (37.1 °C) (Oral)   11/05/20 98.6 °F (37 °C) (Oral)   09/03/20 97.9 °F (36.6 °C) (Temporal)     BP Readings from Last 3 Encounters:   12/23/20 138/70   11/06/20 (!) 176/79   11/05/20 (!) 166/79     Pulse Readings from Last 3 Encounters:   12/23/20 81   11/06/20 90   11/05/20 83       /70 (BP Location: Right arm, Patient Position: Sitting, BP Method: Large (Manual))   Pulse 81   Resp 16   Ht 6' 2" (1.88 m)   Wt 104.1 kg (229 lb 8 oz)   SpO2 (!) 94%   BMI 29.47 kg/m²      Objective:   Physical Exam   Constitutional: He is oriented to person, place, and time. He appears well-developed and well-nourished. No distress.   HENT:   Head: Normocephalic and atraumatic.   Nose: Nose normal.   Mouth/Throat: Oropharynx is clear and moist.   Eyes: Conjunctivae and EOM are normal. No scleral icterus.   Neck: Normal range of motion. Neck supple. No JVD present. No thyromegaly present.   Cardiovascular: Normal rate, regular rhythm, S1 normal and S2 normal. Exam reveals no gallop, no S3, no S4 and no friction rub.   Murmur heard.   Midsystolic murmur is present at the upper right sternal border.  Vilas S2   Pulmonary/Chest: Effort normal and breath sounds normal. No stridor. No respiratory distress. He has no wheezes. He has no rales. He exhibits no tenderness.   Abdominal: Soft. Bowel sounds are normal. He exhibits no distension and no mass. There is no abdominal tenderness. There is no rebound.   Genitourinary:    Genitourinary Comments: Deferred     Musculoskeletal: Normal range of motion.         General: No tenderness, deformity or edema.   Lymphadenopathy:     He has no cervical adenopathy.   Neurological: He is alert and oriented to person, place, and time. He exhibits normal muscle tone. Coordination normal.   Skin: Skin is warm and dry. No rash noted. He is not diaphoretic. No erythema. No pallor.   Psychiatric: He has a normal mood and affect. His behavior is normal. Judgment " and thought content normal.   Nursing note and vitals reviewed.      Lab Results   Component Value Date     12/17/2020    K 4.5 12/17/2020     12/17/2020    CO2 27 12/17/2020    BUN 40 (H) 12/17/2020    CREATININE 12.2 (H) 12/17/2020    GLU 87 12/17/2020    HGBA1C 4.8 09/20/2019    MG 2.1 04/30/2019    AST 20 12/17/2020    ALT 15 12/17/2020    ALBUMIN 3.5 12/17/2020    PROT 8.1 12/17/2020    BILITOT 0.7 12/17/2020    WBC 6.64 12/17/2020    HGB 8.9 (L) 12/17/2020    HCT 28.1 (L) 12/17/2020    HCT 25 (L) 02/08/2017     (H) 12/17/2020     (L) 12/17/2020    INR 1.9 (H) 12/17/2020    CHOL 248 (H) 11/05/2020    HDL 42 11/05/2020    LDLCALC 164.8 (H) 11/05/2020    TRIG 206 (H) 11/05/2020     (H) 07/03/2020     Assessment:      1. Chronic pulmonary heart disease    2. Essential hypertension    3. S/P AVR (aortic valve replacement)    4. Chronic diastolic heart failure    5. History of mechanical aortic valve replacement    6. History of radiofrequency ablation for complex right atrial arrhythmia    7. Hypertensive cardiomegaly with heart failure    8. Nonrheumatic aortic valve stenosis    9. NSVT (nonsustained ventricular tachycardia)    10. Atrial tachycardia    11. Atrial fibrillation, unspecified type    12. Anticoagulated    13. Dyspnea, unspecified type        Plan:   1. Chronic Diastolic HF  - recovered EF  - cont low salt diet  - pt euvolemic     2. S/p mech AVR  - cont coumadin  - f/u with coumadin clinic here    3. ESRD  - cont HD  - stable CV status for transplant, recent stress/echo negative    4. HTN  - cont meds    6. PVCs, SVT s/p RFA   - cont BB and CCB    7. HLD  - cont statin    8. Pre-OP CV evaluation prior to renal transplant  Moderate periop risk of CV events for high risk procedure.  Good functional and exercise capacity.  No chest pain, active arrhythmia and CHF symptoms.  Continue BB and Statin periop.  Need to have stress and echo repeat in 3/2021    9. ED  - cont  Cialis  - monitor BP, avoid nitrates     10. Pulm HTN/ DANIEL  - no significant apnea on recent study  - f/u with pulm  - cont tx    11. Chronic hep B   - cont tx per hepatology  - further workup pending for cirrhosis     12. Dyspnea, r/o PNA  - order COVID screen  - Order BNP and CXR    Thank you for allowing me to participate in this patient's care. Please do not hesitate to contact me with any questions or concerns. Consult note has been forwarded to the referral physician.

## 2020-12-23 NOTE — TELEPHONE ENCOUNTER
----- Message from VERENA Sutton sent at 12/17/2020  1:37 PM CST -----  Pt needs appt to discuss fibroscan.  Consistent with cirrhosis.Continue current medications.

## 2020-12-24 ENCOUNTER — TELEPHONE (OUTPATIENT)
Dept: CARDIOLOGY | Facility: CLINIC | Age: 50
End: 2020-12-24

## 2020-12-24 NOTE — TELEPHONE ENCOUNTER
Spoke with patient gave negative covid test and gave bnp labs. Patient states understanding.    ----- Message from Fuad Gudino MD sent at 12/24/2020 11:24 AM CST -----  Please call the patient regarding his result. Negative COVID test.

## 2020-12-28 DIAGNOSIS — Z76.82 ORGAN TRANSPLANT CANDIDATE: Primary | ICD-10-CM

## 2020-12-31 NOTE — TELEPHONE ENCOUNTER
Spoke with patient to inform him that [facility] received new prescription for Viread. Patient reports he has been filling medication with [facility]. Patient would like [facility] to route Rx to [facility] for continued management. Will forward per patient request.     Viread Rx received. No PA required. Copay $3.90.

## 2021-01-04 DIAGNOSIS — Z79.01 LONG TERM (CURRENT) USE OF ANTICOAGULANTS: ICD-10-CM

## 2021-01-04 RX ORDER — WARFARIN 7.5 MG/1
TABLET ORAL
Qty: 35 TABLET | Refills: 2 | Status: SHIPPED | OUTPATIENT
Start: 2021-01-04 | End: 2021-04-15 | Stop reason: SDUPTHER

## 2021-01-05 ENCOUNTER — TELEPHONE (OUTPATIENT)
Dept: TRANSPLANT | Facility: CLINIC | Age: 51
End: 2021-01-05

## 2021-01-07 ENCOUNTER — ANTI-COAG VISIT (OUTPATIENT)
Dept: CARDIOLOGY | Facility: CLINIC | Age: 51
End: 2021-01-07
Payer: MEDICARE

## 2021-01-07 DIAGNOSIS — I48.91 ATRIAL FIBRILLATION, UNSPECIFIED TYPE: ICD-10-CM

## 2021-01-07 DIAGNOSIS — Z95.2 HISTORY OF MECHANICAL AORTIC VALVE REPLACEMENT: ICD-10-CM

## 2021-01-07 DIAGNOSIS — Z79.01 LONG TERM (CURRENT) USE OF ANTICOAGULANTS: Primary | ICD-10-CM

## 2021-01-07 LAB — INR PPP: 3.7 (ref 2–3)

## 2021-01-07 PROCEDURE — 93793 PR ANTICOAGULANT MGMT FOR PT TAKING WARFARIN: ICD-10-PCS | Mod: ,,,

## 2021-01-07 PROCEDURE — 85610 PROTHROMBIN TIME: CPT | Mod: PBBFAC

## 2021-01-07 PROCEDURE — 93793 ANTICOAG MGMT PT WARFARIN: CPT | Mod: ,,,

## 2021-01-13 ENCOUNTER — HOSPITAL ENCOUNTER (EMERGENCY)
Facility: HOSPITAL | Age: 51
Discharge: HOME | End: 2021-01-13
Attending: EMERGENCY MEDICINE
Payer: MEDICARE

## 2021-01-13 VITALS
RESPIRATION RATE: 21 BRPM | DIASTOLIC BLOOD PRESSURE: 69 MMHG | HEART RATE: 71 BPM | WEIGHT: 222.88 LBS | SYSTOLIC BLOOD PRESSURE: 133 MMHG | TEMPERATURE: 99 F | BODY MASS INDEX: 28.6 KG/M2 | OXYGEN SATURATION: 97 % | HEIGHT: 74 IN

## 2021-01-13 DIAGNOSIS — D64.9 SYMPTOMATIC ANEMIA: ICD-10-CM

## 2021-01-13 DIAGNOSIS — R89.9 ABNORMAL LABORATORY TEST: ICD-10-CM

## 2021-01-13 DIAGNOSIS — N18.6 ESRD ON DIALYSIS: ICD-10-CM

## 2021-01-13 DIAGNOSIS — D64.9 ACUTE ON CHRONIC ANEMIA: Primary | ICD-10-CM

## 2021-01-13 DIAGNOSIS — Z99.2 ESRD ON DIALYSIS: ICD-10-CM

## 2021-01-13 LAB
ABO + RH BLD: NORMAL
ANION GAP SERPL CALC-SCNC: 15 MMOL/L (ref 8–16)
APTT BLDCRRT: 39.6 SEC (ref 21–32)
BASOPHILS # BLD AUTO: 0.02 K/UL (ref 0–0.2)
BASOPHILS NFR BLD: 0.2 % (ref 0–1.9)
BLD GP AB SCN CELLS X3 SERPL QL: NORMAL
BLD PROD TYP BPU: NORMAL
BLOOD UNIT EXPIRATION DATE: NORMAL
BLOOD UNIT TYPE CODE: 5100
BLOOD UNIT TYPE: NORMAL
BUN SERPL-MCNC: 25 MG/DL (ref 6–20)
CALCIUM SERPL-MCNC: 9.4 MG/DL (ref 8.7–10.5)
CHLORIDE SERPL-SCNC: 99 MMOL/L (ref 95–110)
CO2 SERPL-SCNC: 27 MMOL/L (ref 23–29)
CODING SYSTEM: NORMAL
CREAT SERPL-MCNC: 6.6 MG/DL (ref 0.5–1.4)
DIFFERENTIAL METHOD: ABNORMAL
DISPENSE STATUS: NORMAL
EOSINOPHIL # BLD AUTO: 2 K/UL (ref 0–0.5)
EOSINOPHIL NFR BLD: 23.8 % (ref 0–8)
ERYTHROCYTE [DISTWIDTH] IN BLOOD BY AUTOMATED COUNT: 14.5 % (ref 11.5–14.5)
EST. GFR  (AFRICAN AMERICAN): 10 ML/MIN/1.73 M^2
EST. GFR  (NON AFRICAN AMERICAN): 9 ML/MIN/1.73 M^2
GLUCOSE SERPL-MCNC: 116 MG/DL (ref 70–110)
HCT VFR BLD AUTO: 22 % (ref 40–54)
HGB BLD-MCNC: 7.2 G/DL (ref 14–18)
IMM GRANULOCYTES # BLD AUTO: 0.03 K/UL (ref 0–0.04)
IMM GRANULOCYTES NFR BLD AUTO: 0.4 % (ref 0–0.5)
INR PPP: 2.1 (ref 0.8–1.2)
LYMPHOCYTES # BLD AUTO: 1.1 K/UL (ref 1–4.8)
LYMPHOCYTES NFR BLD: 13.6 % (ref 18–48)
MCH RBC QN AUTO: 35.8 PG (ref 27–31)
MCHC RBC AUTO-ENTMCNC: 32.7 G/DL (ref 32–36)
MCV RBC AUTO: 110 FL (ref 82–98)
MONOCYTES # BLD AUTO: 0.7 K/UL (ref 0.3–1)
MONOCYTES NFR BLD: 8.4 % (ref 4–15)
NEUTROPHILS # BLD AUTO: 4.5 K/UL (ref 1.8–7.7)
NEUTROPHILS NFR BLD: 53.6 % (ref 38–73)
NRBC BLD-RTO: 0 /100 WBC
NUM UNITS TRANS PACKED RBC: NORMAL
PLATELET # BLD AUTO: 155 K/UL (ref 150–350)
PMV BLD AUTO: 9.8 FL (ref 9.2–12.9)
POTASSIUM SERPL-SCNC: 3.6 MMOL/L (ref 3.5–5.1)
PROTHROMBIN TIME: 21.2 SEC (ref 9–12.5)
RBC # BLD AUTO: 2.01 M/UL (ref 4.6–6.2)
SODIUM SERPL-SCNC: 141 MMOL/L (ref 136–145)
WBC # BLD AUTO: 8.36 K/UL (ref 3.9–12.7)

## 2021-01-13 PROCEDURE — 80048 BASIC METABOLIC PNL TOTAL CA: CPT | Mod: NTX

## 2021-01-13 PROCEDURE — 93010 EKG 12-LEAD: ICD-10-PCS | Mod: NTX,,, | Performed by: INTERNAL MEDICINE

## 2021-01-13 PROCEDURE — 86920 COMPATIBILITY TEST SPIN: CPT | Mod: NTX

## 2021-01-13 PROCEDURE — 85730 THROMBOPLASTIN TIME PARTIAL: CPT | Mod: NTX

## 2021-01-13 PROCEDURE — 86900 BLOOD TYPING SEROLOGIC ABO: CPT | Mod: NTX

## 2021-01-13 PROCEDURE — 36415 COLL VENOUS BLD VENIPUNCTURE: CPT | Mod: NTX

## 2021-01-13 PROCEDURE — 85025 COMPLETE CBC W/AUTO DIFF WBC: CPT | Mod: NTX

## 2021-01-13 PROCEDURE — 85610 PROTHROMBIN TIME: CPT | Mod: NTX

## 2021-01-13 PROCEDURE — 93005 ELECTROCARDIOGRAM TRACING: CPT | Mod: NTX

## 2021-01-13 PROCEDURE — P9016 RBC LEUKOCYTES REDUCED: HCPCS | Mod: NTX

## 2021-01-13 PROCEDURE — 25000003 PHARM REV CODE 250: Mod: NTX | Performed by: EMERGENCY MEDICINE

## 2021-01-13 PROCEDURE — 93010 ELECTROCARDIOGRAM REPORT: CPT | Mod: NTX,,, | Performed by: INTERNAL MEDICINE

## 2021-01-13 PROCEDURE — 99291 CRITICAL CARE FIRST HOUR: CPT | Mod: 25,NTX

## 2021-01-13 PROCEDURE — 36430 TRANSFUSION BLD/BLD COMPNT: CPT | Mod: NTX

## 2021-01-13 RX ORDER — HYDROCODONE BITARTRATE AND ACETAMINOPHEN 500; 5 MG/1; MG/1
TABLET ORAL
Status: DISCONTINUED | OUTPATIENT
Start: 2021-01-13 | End: 2021-01-13 | Stop reason: HOSPADM

## 2021-01-13 RX ADMIN — SODIUM CHLORIDE: 0.9 INJECTION, SOLUTION INTRAVENOUS at 06:01

## 2021-01-14 ENCOUNTER — OFFICE VISIT (OUTPATIENT)
Dept: CARDIOLOGY | Facility: CLINIC | Age: 51
End: 2021-01-14
Payer: MEDICARE

## 2021-01-14 VITALS
OXYGEN SATURATION: 94 % | RESPIRATION RATE: 16 BRPM | HEIGHT: 74 IN | WEIGHT: 221.31 LBS | HEART RATE: 85 BPM | SYSTOLIC BLOOD PRESSURE: 132 MMHG | BODY MASS INDEX: 28.4 KG/M2 | DIASTOLIC BLOOD PRESSURE: 70 MMHG

## 2021-01-14 DIAGNOSIS — Q24.9 ADULT CONGENITAL HEART DISEASE: ICD-10-CM

## 2021-01-14 DIAGNOSIS — Z95.2 HISTORY OF MECHANICAL AORTIC VALVE REPLACEMENT: ICD-10-CM

## 2021-01-14 DIAGNOSIS — I35.0 NONRHEUMATIC AORTIC VALVE STENOSIS: ICD-10-CM

## 2021-01-14 DIAGNOSIS — N18.6 ESRD (END STAGE RENAL DISEASE) ON DIALYSIS: Chronic | ICD-10-CM

## 2021-01-14 DIAGNOSIS — I48.0 PAROXYSMAL ATRIAL FIBRILLATION: ICD-10-CM

## 2021-01-14 DIAGNOSIS — G47.33 OSA (OBSTRUCTIVE SLEEP APNEA): ICD-10-CM

## 2021-01-14 DIAGNOSIS — I11.0 HYPERTENSIVE CARDIOMEGALY WITH HEART FAILURE: ICD-10-CM

## 2021-01-14 DIAGNOSIS — I47.29 NSVT (NONSUSTAINED VENTRICULAR TACHYCARDIA): Primary | ICD-10-CM

## 2021-01-14 DIAGNOSIS — Z99.2 ESRD (END STAGE RENAL DISEASE) ON DIALYSIS: Chronic | ICD-10-CM

## 2021-01-14 DIAGNOSIS — I50.32 CHRONIC DIASTOLIC HEART FAILURE: ICD-10-CM

## 2021-01-14 DIAGNOSIS — I47.19 ATRIAL TACHYCARDIA: ICD-10-CM

## 2021-01-14 DIAGNOSIS — I10 ESSENTIAL HYPERTENSION: ICD-10-CM

## 2021-01-14 PROCEDURE — 99214 PR OFFICE/OUTPT VISIT, EST, LEVL IV, 30-39 MIN: ICD-10-PCS | Mod: S$PBB,NTX,, | Performed by: INTERNAL MEDICINE

## 2021-01-14 PROCEDURE — 99999 PR PBB SHADOW E&M-EST. PATIENT-LVL V: ICD-10-PCS | Mod: PBBFAC,TXP,, | Performed by: INTERNAL MEDICINE

## 2021-01-14 PROCEDURE — 99999 PR PBB SHADOW E&M-EST. PATIENT-LVL V: CPT | Mod: PBBFAC,TXP,, | Performed by: INTERNAL MEDICINE

## 2021-01-14 PROCEDURE — 99215 OFFICE O/P EST HI 40 MIN: CPT | Mod: PBBFAC,NTX | Performed by: INTERNAL MEDICINE

## 2021-01-14 PROCEDURE — 99214 OFFICE O/P EST MOD 30 MIN: CPT | Mod: S$PBB,NTX,, | Performed by: INTERNAL MEDICINE

## 2021-01-19 ENCOUNTER — TELEPHONE (OUTPATIENT)
Dept: TRANSPLANT | Facility: CLINIC | Age: 51
End: 2021-01-19

## 2021-01-19 ENCOUNTER — OFFICE VISIT (OUTPATIENT)
Dept: HEPATOLOGY | Facility: CLINIC | Age: 51
End: 2021-01-19
Payer: MEDICARE

## 2021-01-19 ENCOUNTER — CLINICAL SUPPORT (OUTPATIENT)
Dept: PULMONOLOGY | Facility: CLINIC | Age: 51
End: 2021-01-19
Payer: MEDICARE

## 2021-01-19 ENCOUNTER — HOSPITAL ENCOUNTER (OUTPATIENT)
Dept: RADIOLOGY | Facility: HOSPITAL | Age: 51
Discharge: HOME | End: 2021-01-19
Attending: NURSE PRACTITIONER
Payer: MEDICARE

## 2021-01-19 VITALS — HEIGHT: 74 IN | BODY MASS INDEX: 28.64 KG/M2 | WEIGHT: 223.13 LBS

## 2021-01-19 VITALS
WEIGHT: 223.56 LBS | HEART RATE: 91 BPM | DIASTOLIC BLOOD PRESSURE: 74 MMHG | HEIGHT: 74 IN | BODY MASS INDEX: 28.69 KG/M2 | SYSTOLIC BLOOD PRESSURE: 140 MMHG

## 2021-01-19 DIAGNOSIS — Z76.82 ORGAN TRANSPLANT CANDIDATE: ICD-10-CM

## 2021-01-19 DIAGNOSIS — Z76.82 KIDNEY TRANSPLANT CANDIDATE: ICD-10-CM

## 2021-01-19 DIAGNOSIS — N18.6 END STAGE RENAL DISEASE: ICD-10-CM

## 2021-01-19 DIAGNOSIS — B18.1 CHRONIC VIRAL HEPATITIS B WITHOUT DELTA AGENT AND WITHOUT COMA: Primary | ICD-10-CM

## 2021-01-19 DIAGNOSIS — K74.60 CHRONIC HEPATITIS B WITH CIRRHOSIS: ICD-10-CM

## 2021-01-19 DIAGNOSIS — Z79.01 CURRENT USE OF LONG TERM ANTICOAGULATION: ICD-10-CM

## 2021-01-19 DIAGNOSIS — B18.1 CHRONIC HEPATITIS B WITH CIRRHOSIS: ICD-10-CM

## 2021-01-19 PROCEDURE — 99999 PR PBB SHADOW E&M-EST. PATIENT-LVL IV: ICD-10-PCS | Mod: PBBFAC,TXP,, | Performed by: NURSE PRACTITIONER

## 2021-01-19 PROCEDURE — 99214 OFFICE O/P EST MOD 30 MIN: CPT | Mod: PBBFAC,25,27,TXP | Performed by: NURSE PRACTITIONER

## 2021-01-19 PROCEDURE — 99999 PR PBB SHADOW E&M-EST. PATIENT-LVL I: CPT | Mod: PBBFAC,TXP,,

## 2021-01-19 PROCEDURE — 72170 X-RAY EXAM OF PELVIS: CPT | Mod: TC,TXP

## 2021-01-19 PROCEDURE — 99211 OFF/OP EST MAY X REQ PHY/QHP: CPT | Mod: PBBFAC,TXP,25

## 2021-01-19 PROCEDURE — 94618 PULMONARY STRESS TESTING: CPT | Mod: 26,S$PBB,TXP, | Performed by: INTERNAL MEDICINE

## 2021-01-19 PROCEDURE — 99214 PR OFFICE/OUTPT VISIT, EST, LEVL IV, 30-39 MIN: ICD-10-PCS | Mod: S$PBB,TXP,, | Performed by: NURSE PRACTITIONER

## 2021-01-19 PROCEDURE — 99214 OFFICE O/P EST MOD 30 MIN: CPT | Mod: S$PBB,TXP,, | Performed by: NURSE PRACTITIONER

## 2021-01-19 PROCEDURE — 99999 PR PBB SHADOW E&M-EST. PATIENT-LVL IV: CPT | Mod: PBBFAC,TXP,, | Performed by: NURSE PRACTITIONER

## 2021-01-19 PROCEDURE — 94618 PULMONARY STRESS TESTING: ICD-10-PCS | Mod: 26,S$PBB,TXP, | Performed by: INTERNAL MEDICINE

## 2021-01-19 PROCEDURE — 72170 X-RAY EXAM OF PELVIS: CPT | Mod: 26,TXP,, | Performed by: RADIOLOGY

## 2021-01-19 PROCEDURE — 94618 PULMONARY STRESS TESTING: CPT | Mod: PBBFAC,TXP

## 2021-01-19 PROCEDURE — 72170 XR PELVIS ROUTINE AP: ICD-10-PCS | Mod: 26,TXP,, | Performed by: RADIOLOGY

## 2021-01-19 PROCEDURE — 99999 PR PBB SHADOW E&M-EST. PATIENT-LVL I: ICD-10-PCS | Mod: PBBFAC,TXP,,

## 2021-01-21 ENCOUNTER — HOSPITAL ENCOUNTER (OUTPATIENT)
Dept: CARDIOLOGY | Facility: HOSPITAL | Age: 51
Discharge: HOME | End: 2021-01-21
Attending: INTERNAL MEDICINE
Payer: MEDICARE

## 2021-01-21 ENCOUNTER — ANTI-COAG VISIT (OUTPATIENT)
Dept: CARDIOLOGY | Facility: CLINIC | Age: 51
End: 2021-01-21
Payer: MEDICARE

## 2021-01-21 VITALS
WEIGHT: 223 LBS | HEIGHT: 74 IN | SYSTOLIC BLOOD PRESSURE: 140 MMHG | BODY MASS INDEX: 28.62 KG/M2 | DIASTOLIC BLOOD PRESSURE: 74 MMHG | HEART RATE: 80 BPM

## 2021-01-21 DIAGNOSIS — Z95.2 HISTORY OF MECHANICAL AORTIC VALVE REPLACEMENT: ICD-10-CM

## 2021-01-21 DIAGNOSIS — I48.0 PAROXYSMAL ATRIAL FIBRILLATION: ICD-10-CM

## 2021-01-21 DIAGNOSIS — Z79.01 LONG TERM (CURRENT) USE OF ANTICOAGULANTS: Primary | ICD-10-CM

## 2021-01-21 DIAGNOSIS — I50.32 CHRONIC DIASTOLIC HEART FAILURE: ICD-10-CM

## 2021-01-21 LAB
ASCENDING AORTA: 3.44 CM
AV INDEX (PROSTH): 0.38
AV MEAN GRADIENT: 36 MMHG
AV PEAK GRADIENT: 72 MMHG
AV VALVE AREA: 1.98 CM2
AV VELOCITY RATIO: 0.33
BSA FOR ECHO PROCEDURE: 2.3 M2
CV ECHO LV RWT: 0.58 CM
DOP CALC AO PEAK VEL: 4.24 M/S
DOP CALC AO VTI: 80.38 CM
DOP CALC LVOT AREA: 5.2 CM2
DOP CALC LVOT DIAMETER: 2.58 CM
DOP CALC LVOT PEAK VEL: 1.4 M/S
DOP CALC LVOT STROKE VOLUME: 159.42 CM3
DOP CALC RVOT PEAK VEL: 0.61 M/S
DOP CALC RVOT VTI: 13.93 CM
DOP CALCLVOT PEAK VEL VTI: 30.51 CM
E WAVE DECELERATION TIME: 224.99 MSEC
E/A RATIO: 1.89
E/E' RATIO: 42.15 M/S
ECHO LV POSTERIOR WALL: 1.49 CM (ref 0.6–1.1)
FRACTIONAL SHORTENING: 39 % (ref 28–44)
HR MV ECHO: 79 BPM
INR PPP: 2 (ref 2–3)
INTERVENTRICULAR SEPTUM: 1.39 CM (ref 0.6–1.1)
IVRT: 62.8 MSEC
LA MAJOR: 6.29 CM
LA MINOR: 6.44 CM
LA WIDTH: 4.16 CM
LEFT ATRIUM SIZE: 5.04 CM
LEFT ATRIUM VOLUME INDEX: 49.8 ML/M2
LEFT ATRIUM VOLUME: 113.42 CM3
LEFT INTERNAL DIMENSION IN SYSTOLE: 3.16 CM (ref 2.1–4)
LEFT VENTRICLE DIASTOLIC VOLUME INDEX: 55.65 ML/M2
LEFT VENTRICLE DIASTOLIC VOLUME: 126.67 ML
LEFT VENTRICLE MASS INDEX: 140 G/M2
LEFT VENTRICLE SYSTOLIC VOLUME INDEX: 17.4 ML/M2
LEFT VENTRICLE SYSTOLIC VOLUME: 39.59 ML
LEFT VENTRICULAR INTERNAL DIMENSION IN DIASTOLE: 5.15 CM (ref 3.5–6)
LEFT VENTRICULAR MASS: 317.75 G
LV LATERAL E/E' RATIO: 39.14 M/S
LV SEPTAL E/E' RATIO: 45.67 M/S
MV MEAN GRADIENT: 14 MMHG
MV PEAK A VEL: 1.45 M/S
MV PEAK E VEL: 2.74 M/S
MV PEAK GRADIENT: 32 MMHG
PISA TR MAX VEL: 3.77 M/S
PULM VEIN S/D RATIO: 0.56
PV MEAN GRADIENT: 1 MMHG
PV PEAK D VEL: 0.84 M/S
PV PEAK GRADIENT: 2 MMHG
PV PEAK S VEL: 0.47 M/S
PV PEAK VELOCITY: 1.59 CM/S
RA MAJOR: 5.43 CM
RA PRESSURE: 15 MMHG
RA WIDTH: 4.21 CM
RIGHT VENTRICULAR END-DIASTOLIC DIMENSION: 4.2 CM
SINUS: 3.46 CM
STJ: 2.63 CM
TDI LATERAL: 0.07 M/S
TDI SEPTAL: 0.06 M/S
TDI: 0.07 M/S
TR MAX PG: 57 MMHG
TRICUSPID ANNULAR PLANE SYSTOLIC EXCURSION: 1.48 CM
TV REST PULMONARY ARTERY PRESSURE: 72 MMHG

## 2021-01-21 PROCEDURE — 93793 ANTICOAG MGMT PT WARFARIN: CPT | Mod: ,,,

## 2021-01-21 PROCEDURE — 85610 PROTHROMBIN TIME: CPT | Mod: PBBFAC

## 2021-01-21 PROCEDURE — 93306 ECHO (CUPID ONLY): ICD-10-PCS | Mod: 26,NTX,, | Performed by: INTERNAL MEDICINE

## 2021-01-21 PROCEDURE — 93306 TTE W/DOPPLER COMPLETE: CPT | Mod: 26,NTX,, | Performed by: INTERNAL MEDICINE

## 2021-01-21 PROCEDURE — 93793 PR ANTICOAGULANT MGMT FOR PT TAKING WARFARIN: ICD-10-PCS | Mod: ,,,

## 2021-01-21 PROCEDURE — 93306 TTE W/DOPPLER COMPLETE: CPT | Mod: NTX

## 2021-01-23 ENCOUNTER — HOSPITAL ENCOUNTER (EMERGENCY)
Facility: HOSPITAL | Age: 51
Discharge: HOME | End: 2021-01-23
Attending: EMERGENCY MEDICINE
Payer: MEDICARE

## 2021-01-23 VITALS
WEIGHT: 219.13 LBS | RESPIRATION RATE: 22 BRPM | BODY MASS INDEX: 28.14 KG/M2 | HEART RATE: 74 BPM | TEMPERATURE: 98 F | OXYGEN SATURATION: 92 % | DIASTOLIC BLOOD PRESSURE: 74 MMHG | SYSTOLIC BLOOD PRESSURE: 152 MMHG

## 2021-01-23 DIAGNOSIS — D64.9 SYMPTOMATIC ANEMIA: ICD-10-CM

## 2021-01-23 DIAGNOSIS — R53.1 WEAKNESS: ICD-10-CM

## 2021-01-23 DIAGNOSIS — R53.83 FATIGUE, UNSPECIFIED TYPE: Primary | ICD-10-CM

## 2021-01-23 LAB
ABO + RH BLD: NORMAL
ALBUMIN SERPL BCP-MCNC: 3.4 G/DL (ref 3.5–5.2)
ALP SERPL-CCNC: 63 U/L (ref 55–135)
ALT SERPL W/O P-5'-P-CCNC: 19 U/L (ref 10–44)
ANION GAP SERPL CALC-SCNC: 11 MMOL/L (ref 8–16)
AST SERPL-CCNC: 23 U/L (ref 10–40)
BASOPHILS NFR BLD: 0 % (ref 0–1.9)
BILIRUB SERPL-MCNC: 0.8 MG/DL (ref 0.1–1)
BLD GP AB SCN CELLS X3 SERPL QL: NORMAL
BLD PROD TYP BPU: NORMAL
BLOOD UNIT EXPIRATION DATE: NORMAL
BLOOD UNIT TYPE CODE: 5100
BLOOD UNIT TYPE: NORMAL
BUN SERPL-MCNC: 26 MG/DL (ref 6–20)
CALCIUM SERPL-MCNC: 9.8 MG/DL (ref 8.7–10.5)
CHLORIDE SERPL-SCNC: 102 MMOL/L (ref 95–110)
CO2 SERPL-SCNC: 31 MMOL/L (ref 23–29)
CODING SYSTEM: NORMAL
CREAT SERPL-MCNC: 8.7 MG/DL (ref 0.5–1.4)
DIFFERENTIAL METHOD: ABNORMAL
DISPENSE STATUS: NORMAL
EOSINOPHIL NFR BLD: 20 % (ref 0–8)
ERYTHROCYTE [DISTWIDTH] IN BLOOD BY AUTOMATED COUNT: 15.6 % (ref 11.5–14.5)
EST. GFR  (AFRICAN AMERICAN): 7 ML/MIN/1.73 M^2
EST. GFR  (NON AFRICAN AMERICAN): 6 ML/MIN/1.73 M^2
GLUCOSE SERPL-MCNC: 91 MG/DL (ref 70–110)
HCT VFR BLD AUTO: 21.5 % (ref 40–54)
HGB BLD-MCNC: 6.8 G/DL (ref 14–18)
IMM GRANULOCYTES # BLD AUTO: ABNORMAL K/UL (ref 0–0.04)
IMM GRANULOCYTES NFR BLD AUTO: ABNORMAL % (ref 0–0.5)
INR PPP: 1.6 (ref 0.8–1.2)
LYMPHOCYTES NFR BLD: 10 % (ref 18–48)
MAGNESIUM SERPL-MCNC: 2 MG/DL (ref 1.6–2.6)
MCH RBC QN AUTO: 35.6 PG (ref 27–31)
MCHC RBC AUTO-ENTMCNC: 31.6 G/DL (ref 32–36)
MCV RBC AUTO: 113 FL (ref 82–98)
MONOCYTES NFR BLD: 5 % (ref 4–15)
NEUTROPHILS NFR BLD: 65 % (ref 38–73)
NRBC BLD-RTO: 0 /100 WBC
NUM UNITS TRANS PACKED RBC: NORMAL
PHOSPHATE SERPL-MCNC: 3.6 MG/DL (ref 2.7–4.5)
PLATELET # BLD AUTO: 125 K/UL (ref 150–350)
PLATELET BLD QL SMEAR: ABNORMAL
PMV BLD AUTO: 10 FL (ref 9.2–12.9)
POTASSIUM SERPL-SCNC: 4.3 MMOL/L (ref 3.5–5.1)
PROT SERPL-MCNC: 7.8 G/DL (ref 6–8.4)
PROTHROMBIN TIME: 17.4 SEC (ref 9–12.5)
RBC # BLD AUTO: 1.91 M/UL (ref 4.6–6.2)
SARS-COV-2 RDRP RESP QL NAA+PROBE: NEGATIVE
SODIUM SERPL-SCNC: 144 MMOL/L (ref 136–145)
TROPONIN I SERPL DL<=0.01 NG/ML-MCNC: 0.04 NG/ML (ref 0–0.03)
WBC # BLD AUTO: 10.09 K/UL (ref 3.9–12.7)

## 2021-01-23 PROCEDURE — U0002 COVID-19 LAB TEST NON-CDC: HCPCS | Mod: NTX

## 2021-01-23 PROCEDURE — 86900 BLOOD TYPING SEROLOGIC ABO: CPT | Mod: NTX

## 2021-01-23 PROCEDURE — 84484 ASSAY OF TROPONIN QUANT: CPT | Mod: NTX

## 2021-01-23 PROCEDURE — 93010 EKG 12-LEAD: ICD-10-PCS | Mod: NTX,,, | Performed by: INTERNAL MEDICINE

## 2021-01-23 PROCEDURE — 86920 COMPATIBILITY TEST SPIN: CPT | Mod: NTX

## 2021-01-23 PROCEDURE — P9016 RBC LEUKOCYTES REDUCED: HCPCS | Mod: NTX

## 2021-01-23 PROCEDURE — 85610 PROTHROMBIN TIME: CPT | Mod: NTX

## 2021-01-23 PROCEDURE — 99291 CRITICAL CARE FIRST HOUR: CPT | Mod: 25,NTX

## 2021-01-23 PROCEDURE — 84100 ASSAY OF PHOSPHORUS: CPT | Mod: NTX

## 2021-01-23 PROCEDURE — 85007 BL SMEAR W/DIFF WBC COUNT: CPT | Mod: NTX

## 2021-01-23 PROCEDURE — 80053 COMPREHEN METABOLIC PANEL: CPT | Mod: NTX

## 2021-01-23 PROCEDURE — 83735 ASSAY OF MAGNESIUM: CPT | Mod: NTX

## 2021-01-23 PROCEDURE — 93005 ELECTROCARDIOGRAM TRACING: CPT | Mod: NTX

## 2021-01-23 PROCEDURE — 85027 COMPLETE CBC AUTOMATED: CPT | Mod: NTX

## 2021-01-23 PROCEDURE — 36430 TRANSFUSION BLD/BLD COMPNT: CPT | Mod: NTX

## 2021-01-23 PROCEDURE — 93010 ELECTROCARDIOGRAM REPORT: CPT | Mod: NTX,,, | Performed by: INTERNAL MEDICINE

## 2021-01-23 RX ORDER — HYDROCODONE BITARTRATE AND ACETAMINOPHEN 500; 5 MG/1; MG/1
TABLET ORAL
Status: DISCONTINUED | OUTPATIENT
Start: 2021-01-23 | End: 2021-01-23 | Stop reason: HOSPADM

## 2021-01-25 ENCOUNTER — TELEPHONE (OUTPATIENT)
Dept: CARDIOLOGY | Facility: CLINIC | Age: 51
End: 2021-01-25

## 2021-01-26 ENCOUNTER — OFFICE VISIT (OUTPATIENT)
Dept: DERMATOLOGY | Facility: CLINIC | Age: 51
End: 2021-01-26
Payer: MEDICARE

## 2021-01-26 DIAGNOSIS — L30.9 ECZEMA, UNSPECIFIED TYPE: Primary | ICD-10-CM

## 2021-01-26 DIAGNOSIS — L70.9 ACNE, UNSPECIFIED ACNE TYPE: ICD-10-CM

## 2021-01-26 PROCEDURE — 99204 PR OFFICE/OUTPT VISIT, NEW, LEVL IV, 45-59 MIN: ICD-10-PCS | Mod: S$PBB,,, | Performed by: STUDENT IN AN ORGANIZED HEALTH CARE EDUCATION/TRAINING PROGRAM

## 2021-01-26 PROCEDURE — 99999 PR PBB SHADOW E&M-EST. PATIENT-LVL III: ICD-10-PCS | Mod: PBBFAC,,, | Performed by: STUDENT IN AN ORGANIZED HEALTH CARE EDUCATION/TRAINING PROGRAM

## 2021-01-26 PROCEDURE — 99999 PR PBB SHADOW E&M-EST. PATIENT-LVL III: CPT | Mod: PBBFAC,,, | Performed by: STUDENT IN AN ORGANIZED HEALTH CARE EDUCATION/TRAINING PROGRAM

## 2021-01-26 PROCEDURE — 99204 OFFICE O/P NEW MOD 45 MIN: CPT | Mod: S$PBB,,, | Performed by: STUDENT IN AN ORGANIZED HEALTH CARE EDUCATION/TRAINING PROGRAM

## 2021-01-26 PROCEDURE — 99213 OFFICE O/P EST LOW 20 MIN: CPT | Mod: PBBFAC | Performed by: STUDENT IN AN ORGANIZED HEALTH CARE EDUCATION/TRAINING PROGRAM

## 2021-01-26 RX ORDER — TRIAMCINOLONE ACETONIDE 1 MG/G
OINTMENT TOPICAL 2 TIMES DAILY
Qty: 80 G | Refills: 0 | Status: SHIPPED | OUTPATIENT
Start: 2021-01-26 | End: 2023-12-12 | Stop reason: SDUPTHER

## 2021-01-26 RX ORDER — CHLORHEXIDINE GLUCONATE 40 MG/ML
SOLUTION TOPICAL DAILY PRN
Qty: 473 ML | Refills: 1 | Status: SHIPPED | OUTPATIENT
Start: 2021-01-26 | End: 2021-11-16

## 2021-01-26 RX ORDER — DOXYCYCLINE HYCLATE 100 MG
TABLET ORAL
Qty: 30 TABLET | Refills: 0 | Status: SHIPPED | OUTPATIENT
Start: 2021-01-26 | End: 2021-11-16

## 2021-02-04 ENCOUNTER — TELEPHONE (OUTPATIENT)
Dept: TRANSPLANT | Facility: CLINIC | Age: 51
End: 2021-02-04

## 2021-02-05 RX ORDER — CALCIUM ACETATE 667 MG/1
1334 CAPSULE ORAL 3 TIMES DAILY
Qty: 180 CAPSULE | Refills: 0 | Status: SHIPPED | OUTPATIENT
Start: 2021-02-05 | End: 2021-04-01 | Stop reason: SDUPTHER

## 2021-02-05 RX ORDER — VIT B COMP NO.3/FOLIC/C/BIOTIN 1 MG-60 MG
1 TABLET ORAL DAILY
Qty: 90 TABLET | Refills: 2 | Status: SHIPPED | OUTPATIENT
Start: 2021-02-05 | End: 2021-09-30 | Stop reason: SDUPTHER

## 2021-02-09 ENCOUNTER — ANTI-COAG VISIT (OUTPATIENT)
Dept: CARDIOLOGY | Facility: CLINIC | Age: 51
End: 2021-02-09
Payer: MEDICARE

## 2021-02-09 ENCOUNTER — OFFICE VISIT (OUTPATIENT)
Dept: CARDIOLOGY | Facility: CLINIC | Age: 51
End: 2021-02-09
Payer: MEDICARE

## 2021-02-09 VITALS
BODY MASS INDEX: 28.07 KG/M2 | RESPIRATION RATE: 16 BRPM | HEART RATE: 82 BPM | DIASTOLIC BLOOD PRESSURE: 70 MMHG | OXYGEN SATURATION: 92 % | SYSTOLIC BLOOD PRESSURE: 110 MMHG | WEIGHT: 218.69 LBS | HEIGHT: 74 IN

## 2021-02-09 DIAGNOSIS — Z79.01 LONG TERM (CURRENT) USE OF ANTICOAGULANTS: ICD-10-CM

## 2021-02-09 DIAGNOSIS — I35.0 NONRHEUMATIC AORTIC VALVE STENOSIS: ICD-10-CM

## 2021-02-09 DIAGNOSIS — I48.0 PAROXYSMAL ATRIAL FIBRILLATION: ICD-10-CM

## 2021-02-09 DIAGNOSIS — N18.6 ESRD (END STAGE RENAL DISEASE) ON DIALYSIS: Chronic | ICD-10-CM

## 2021-02-09 DIAGNOSIS — Z95.2 HISTORY OF MECHANICAL AORTIC VALVE REPLACEMENT: ICD-10-CM

## 2021-02-09 DIAGNOSIS — I11.0 HYPERTENSIVE CARDIOMEGALY WITH HEART FAILURE: ICD-10-CM

## 2021-02-09 DIAGNOSIS — I10 ESSENTIAL HYPERTENSION: ICD-10-CM

## 2021-02-09 DIAGNOSIS — Z99.2 ESRD (END STAGE RENAL DISEASE) ON DIALYSIS: Chronic | ICD-10-CM

## 2021-02-09 DIAGNOSIS — Z98.890 HISTORY OF RADIOFREQUENCY ABLATION FOR COMPLEX RIGHT ATRIAL ARRHYTHMIA: ICD-10-CM

## 2021-02-09 DIAGNOSIS — G47.33 OSA (OBSTRUCTIVE SLEEP APNEA): ICD-10-CM

## 2021-02-09 DIAGNOSIS — I34.2 NONRHEUMATIC MITRAL VALVE STENOSIS: ICD-10-CM

## 2021-02-09 DIAGNOSIS — Z79.01 ANTICOAGULATED: ICD-10-CM

## 2021-02-09 DIAGNOSIS — D63.1 ANEMIA ASSOCIATED WITH CHRONIC RENAL FAILURE: ICD-10-CM

## 2021-02-09 DIAGNOSIS — Q24.9 ADULT CONGENITAL HEART DISEASE: ICD-10-CM

## 2021-02-09 DIAGNOSIS — I47.29 NSVT (NONSUSTAINED VENTRICULAR TACHYCARDIA): ICD-10-CM

## 2021-02-09 DIAGNOSIS — I50.32 CHRONIC DIASTOLIC HEART FAILURE: Primary | ICD-10-CM

## 2021-02-09 DIAGNOSIS — N18.9 ANEMIA ASSOCIATED WITH CHRONIC RENAL FAILURE: ICD-10-CM

## 2021-02-09 LAB — INR PPP: 3.5 (ref 2–3)

## 2021-02-09 PROCEDURE — 85610 PROTHROMBIN TIME: CPT | Mod: PBBFAC,NTX

## 2021-02-09 PROCEDURE — 99214 PR OFFICE/OUTPT VISIT, EST, LEVL IV, 30-39 MIN: ICD-10-PCS | Mod: S$PBB,NTX,, | Performed by: INTERNAL MEDICINE

## 2021-02-09 PROCEDURE — 99999 PR PBB SHADOW E&M-EST. PATIENT-LVL III: CPT | Mod: PBBFAC,TXP,, | Performed by: INTERNAL MEDICINE

## 2021-02-09 PROCEDURE — 99214 OFFICE O/P EST MOD 30 MIN: CPT | Mod: S$PBB,NTX,, | Performed by: INTERNAL MEDICINE

## 2021-02-09 PROCEDURE — 99999 PR PBB SHADOW E&M-EST. PATIENT-LVL III: ICD-10-PCS | Mod: PBBFAC,TXP,, | Performed by: INTERNAL MEDICINE

## 2021-02-09 PROCEDURE — 99213 OFFICE O/P EST LOW 20 MIN: CPT | Mod: PBBFAC,TXP | Performed by: INTERNAL MEDICINE

## 2021-02-11 ENCOUNTER — LAB VISIT (OUTPATIENT)
Dept: LAB | Facility: HOSPITAL | Age: 51
End: 2021-02-11
Attending: INTERNAL MEDICINE
Payer: MEDICARE

## 2021-02-11 ENCOUNTER — OFFICE VISIT (OUTPATIENT)
Dept: HEMATOLOGY/ONCOLOGY | Facility: CLINIC | Age: 51
End: 2021-02-11
Payer: MEDICARE

## 2021-02-11 VITALS
WEIGHT: 218.06 LBS | DIASTOLIC BLOOD PRESSURE: 71 MMHG | HEIGHT: 74 IN | BODY MASS INDEX: 27.98 KG/M2 | OXYGEN SATURATION: 95 % | TEMPERATURE: 99 F | HEART RATE: 80 BPM | SYSTOLIC BLOOD PRESSURE: 121 MMHG

## 2021-02-11 DIAGNOSIS — D63.1 ANEMIA ASSOCIATED WITH CHRONIC RENAL FAILURE: ICD-10-CM

## 2021-02-11 DIAGNOSIS — D69.6 THROMBOCYTOPENIA: ICD-10-CM

## 2021-02-11 DIAGNOSIS — D53.9 MACROCYTIC ANEMIA: ICD-10-CM

## 2021-02-11 DIAGNOSIS — N18.6 END STAGE RENAL DISEASE: Primary | ICD-10-CM

## 2021-02-11 DIAGNOSIS — N18.6 END STAGE RENAL DISEASE: ICD-10-CM

## 2021-02-11 DIAGNOSIS — N18.9 ANEMIA ASSOCIATED WITH CHRONIC RENAL FAILURE: ICD-10-CM

## 2021-02-11 LAB
ALBUMIN SERPL BCP-MCNC: 3.6 G/DL (ref 3.5–5.2)
ALP SERPL-CCNC: 69 U/L (ref 55–135)
ALT SERPL W/O P-5'-P-CCNC: 18 U/L (ref 10–44)
ANION GAP SERPL CALC-SCNC: 14 MMOL/L (ref 8–16)
AST SERPL-CCNC: 17 U/L (ref 10–40)
BASOPHILS # BLD AUTO: 0.02 K/UL (ref 0–0.2)
BASOPHILS NFR BLD: 0.3 % (ref 0–1.9)
BILIRUB SERPL-MCNC: 0.6 MG/DL (ref 0.1–1)
BUN SERPL-MCNC: 42 MG/DL (ref 6–20)
CALCIUM SERPL-MCNC: 9.7 MG/DL (ref 8.7–10.5)
CHLORIDE SERPL-SCNC: 100 MMOL/L (ref 95–110)
CO2 SERPL-SCNC: 27 MMOL/L (ref 23–29)
CREAT SERPL-MCNC: 9.7 MG/DL (ref 0.5–1.4)
DIFFERENTIAL METHOD: ABNORMAL
EOSINOPHIL # BLD AUTO: 1.1 K/UL (ref 0–0.5)
EOSINOPHIL NFR BLD: 17.6 % (ref 0–8)
ERYTHROCYTE [DISTWIDTH] IN BLOOD BY AUTOMATED COUNT: 16.7 % (ref 11.5–14.5)
EST. GFR  (AFRICAN AMERICAN): 6 ML/MIN/1.73 M^2
EST. GFR  (NON AFRICAN AMERICAN): 6 ML/MIN/1.73 M^2
GLUCOSE SERPL-MCNC: 86 MG/DL (ref 70–110)
HCT VFR BLD AUTO: 24.8 % (ref 40–54)
HGB BLD-MCNC: 7.9 G/DL (ref 14–18)
IGA SERPL-MCNC: 195 MG/DL (ref 40–350)
IGG SERPL-MCNC: 1547 MG/DL (ref 650–1600)
IGM SERPL-MCNC: 113 MG/DL (ref 50–300)
IMM GRANULOCYTES # BLD AUTO: 0 K/UL (ref 0–0.04)
IMM GRANULOCYTES NFR BLD AUTO: 0 % (ref 0–0.5)
LYMPHOCYTES # BLD AUTO: 0.9 K/UL (ref 1–4.8)
LYMPHOCYTES NFR BLD: 14.3 % (ref 18–48)
MCH RBC QN AUTO: 35.7 PG (ref 27–31)
MCHC RBC AUTO-ENTMCNC: 31.9 G/DL (ref 32–36)
MCV RBC AUTO: 112 FL (ref 82–98)
MONOCYTES # BLD AUTO: 0.5 K/UL (ref 0.3–1)
MONOCYTES NFR BLD: 8.6 % (ref 4–15)
NEUTROPHILS # BLD AUTO: 3.6 K/UL (ref 1.8–7.7)
NEUTROPHILS NFR BLD: 59.2 % (ref 38–73)
NRBC BLD-RTO: 0 /100 WBC
PATH REV BLD -IMP: NORMAL
PLATELET # BLD AUTO: 125 K/UL (ref 150–350)
PMV BLD AUTO: 10 FL (ref 9.2–12.9)
POTASSIUM SERPL-SCNC: 4 MMOL/L (ref 3.5–5.1)
PROT SERPL-MCNC: 8.1 G/DL (ref 6–8.4)
RBC # BLD AUTO: 2.21 M/UL (ref 4.6–6.2)
RETICS/RBC NFR AUTO: 2.6 % (ref 0.4–2)
SODIUM SERPL-SCNC: 141 MMOL/L (ref 136–145)
VIT B12 SERPL-MCNC: >2000 PG/ML (ref 210–950)
WBC # BLD AUTO: 6.15 K/UL (ref 3.9–12.7)

## 2021-02-11 PROCEDURE — 85060 PATHOLOGIST REVIEW: ICD-10-PCS | Mod: ,,, | Performed by: PATHOLOGY

## 2021-02-11 PROCEDURE — 99214 OFFICE O/P EST MOD 30 MIN: CPT | Mod: S$PBB,ICN,, | Performed by: INTERNAL MEDICINE

## 2021-02-11 PROCEDURE — 99999 PR PBB SHADOW E&M-EST. PATIENT-LVL III: ICD-10-PCS | Mod: PBBFAC,,, | Performed by: INTERNAL MEDICINE

## 2021-02-11 PROCEDURE — 86334 IMMUNOFIX E-PHORESIS SERUM: CPT | Mod: 26,,, | Performed by: PATHOLOGY

## 2021-02-11 PROCEDURE — 99999 PR PBB SHADOW E&M-EST. PATIENT-LVL III: CPT | Mod: PBBFAC,,, | Performed by: INTERNAL MEDICINE

## 2021-02-11 PROCEDURE — 99214 PR OFFICE/OUTPT VISIT, EST, LEVL IV, 30-39 MIN: ICD-10-PCS | Mod: S$PBB,ICN,, | Performed by: INTERNAL MEDICINE

## 2021-02-11 PROCEDURE — 86334 PATHOLOGIST INTERPRETATION IFE: ICD-10-PCS | Mod: 26,,, | Performed by: PATHOLOGY

## 2021-02-11 PROCEDURE — 85060 BLOOD SMEAR INTERPRETATION: CPT | Mod: ,,, | Performed by: PATHOLOGY

## 2021-02-11 PROCEDURE — 99213 OFFICE O/P EST LOW 20 MIN: CPT | Mod: PBBFAC | Performed by: INTERNAL MEDICINE

## 2021-02-11 PROCEDURE — 84165 PATHOLOGIST INTERPRETATION SPE: ICD-10-PCS | Mod: 26,,, | Performed by: PATHOLOGY

## 2021-02-11 PROCEDURE — 82607 VITAMIN B-12: CPT | Mod: NTX

## 2021-02-11 PROCEDURE — 36415 COLL VENOUS BLD VENIPUNCTURE: CPT | Mod: NTX

## 2021-02-11 PROCEDURE — 83520 IMMUNOASSAY QUANT NOS NONAB: CPT

## 2021-02-11 PROCEDURE — 82746 ASSAY OF FOLIC ACID SERUM: CPT | Mod: NTX

## 2021-02-11 PROCEDURE — 84165 PROTEIN E-PHORESIS SERUM: CPT | Mod: NTX

## 2021-02-11 PROCEDURE — 86334 IMMUNOFIX E-PHORESIS SERUM: CPT

## 2021-02-11 PROCEDURE — 82784 ASSAY IGA/IGD/IGG/IGM EACH: CPT | Mod: 59,NTX

## 2021-02-11 PROCEDURE — 85025 COMPLETE CBC W/AUTO DIFF WBC: CPT | Mod: NTX

## 2021-02-11 PROCEDURE — 84165 PROTEIN E-PHORESIS SERUM: CPT | Mod: 26,,, | Performed by: PATHOLOGY

## 2021-02-11 PROCEDURE — 80053 COMPREHEN METABOLIC PANEL: CPT | Mod: TXP

## 2021-02-11 PROCEDURE — 85045 AUTOMATED RETICULOCYTE COUNT: CPT | Mod: TXP

## 2021-02-12 ENCOUNTER — TELEPHONE (OUTPATIENT)
Dept: TRANSPLANT | Facility: CLINIC | Age: 51
End: 2021-02-12

## 2021-02-12 ENCOUNTER — COMMITTEE REVIEW (OUTPATIENT)
Dept: TRANSPLANT | Facility: CLINIC | Age: 51
End: 2021-02-12

## 2021-02-12 LAB
ALBUMIN SERPL ELPH-MCNC: 4.98 G/DL (ref 3.35–5.55)
ALPHA1 GLOB SERPL ELPH-MCNC: 0.44 G/DL (ref 0.17–0.41)
ALPHA2 GLOB SERPL ELPH-MCNC: 0.96 G/DL (ref 0.43–0.99)
B-GLOBULIN SERPL ELPH-MCNC: 0.77 G/DL (ref 0.5–1.1)
FOLATE SERPL-MCNC: 37.9 NG/ML (ref 4–24)
GAMMA GLOB SERPL ELPH-MCNC: 1.65 G/DL (ref 0.67–1.58)
INTERPRETATION SERPL IFE-IMP: NORMAL
INTERPRETATION SERPL IFE-IMP: NORMAL
KAPPA LC SER QL IA: 31.18 MG/DL (ref 0.33–1.94)
KAPPA LC/LAMBDA SER IA: 1.37 (ref 0.26–1.65)
LAMBDA LC SER QL IA: 22.73 MG/DL (ref 0.57–2.63)
PATH REV BLD -IMP: NORMAL
PATHOLOGIST INTERPRETATION IFE: NORMAL
PATHOLOGIST INTERPRETATION SPE: NORMAL
PROT SERPL-MCNC: 8.8 G/DL (ref 6–8.4)

## 2021-02-15 ENCOUNTER — TELEPHONE (OUTPATIENT)
Dept: TRANSPLANT | Facility: CLINIC | Age: 51
End: 2021-02-15

## 2021-02-17 ENCOUNTER — TELEPHONE (OUTPATIENT)
Dept: PULMONOLOGY | Facility: CLINIC | Age: 51
End: 2021-02-17

## 2021-02-17 ENCOUNTER — TELEPHONE (OUTPATIENT)
Dept: HEMATOLOGY/ONCOLOGY | Facility: CLINIC | Age: 51
End: 2021-02-17

## 2021-02-17 DIAGNOSIS — N18.9 ANEMIA ASSOCIATED WITH CHRONIC RENAL FAILURE: Primary | ICD-10-CM

## 2021-02-17 DIAGNOSIS — D63.1 ANEMIA ASSOCIATED WITH CHRONIC RENAL FAILURE: Primary | ICD-10-CM

## 2021-02-18 ENCOUNTER — TELEPHONE (OUTPATIENT)
Dept: HEMATOLOGY/ONCOLOGY | Facility: CLINIC | Age: 51
End: 2021-02-18

## 2021-02-18 ENCOUNTER — OFFICE VISIT (OUTPATIENT)
Dept: CARDIOTHORACIC SURGERY | Facility: CLINIC | Age: 51
End: 2021-02-18
Payer: MEDICARE

## 2021-02-18 VITALS
WEIGHT: 219.94 LBS | HEIGHT: 74 IN | TEMPERATURE: 99 F | DIASTOLIC BLOOD PRESSURE: 62 MMHG | SYSTOLIC BLOOD PRESSURE: 132 MMHG | HEART RATE: 79 BPM | OXYGEN SATURATION: 97 % | BODY MASS INDEX: 28.23 KG/M2

## 2021-02-18 DIAGNOSIS — I05.0: Primary | ICD-10-CM

## 2021-02-18 PROCEDURE — 99999 PR PBB SHADOW E&M-EST. PATIENT-LVL III: CPT | Mod: PBBFAC,,, | Performed by: THORACIC SURGERY (CARDIOTHORACIC VASCULAR SURGERY)

## 2021-02-18 PROCEDURE — 99999 PR PBB SHADOW E&M-EST. PATIENT-LVL III: ICD-10-PCS | Mod: PBBFAC,,, | Performed by: THORACIC SURGERY (CARDIOTHORACIC VASCULAR SURGERY)

## 2021-02-18 PROCEDURE — 99213 OFFICE O/P EST LOW 20 MIN: CPT | Mod: PBBFAC | Performed by: THORACIC SURGERY (CARDIOTHORACIC VASCULAR SURGERY)

## 2021-02-18 PROCEDURE — 99205 PR OFFICE/OUTPT VISIT, NEW, LEVL V, 60-74 MIN: ICD-10-PCS | Mod: S$PBB,,, | Performed by: THORACIC SURGERY (CARDIOTHORACIC VASCULAR SURGERY)

## 2021-02-18 PROCEDURE — 99205 OFFICE O/P NEW HI 60 MIN: CPT | Mod: S$PBB,,, | Performed by: THORACIC SURGERY (CARDIOTHORACIC VASCULAR SURGERY)

## 2021-02-19 ENCOUNTER — DOCUMENTATION ONLY (OUTPATIENT)
Dept: CARDIAC CATH/INVASIVE PROCEDURES | Facility: HOSPITAL | Age: 51
End: 2021-02-19

## 2021-02-21 NOTE — ED NOTES
Patient placed on continuous cardiac monitor, automatic blood pressure cuff and continuous pulse oximeter.  
Patient placed on continuous cardiac monitor, automatic blood pressure cuff and continuous pulse oximeter.  
Pt resting in bed. NAD, VSS, RR equal and unlabored. Will continue to monitor  
Pt took home medications in ER. Explained to patient importance of not taking medications brought with him since we are starting his medications while here. Pt verbalizes understanding.,  
no diabetes and no thyroid trouble.

## 2021-02-22 ENCOUNTER — EDUCATION (OUTPATIENT)
Dept: CARDIOLOGY | Facility: CLINIC | Age: 51
End: 2021-02-22

## 2021-02-22 DIAGNOSIS — B18.1 CHRONIC VIRAL HEPATITIS B WITHOUT DELTA AGENT AND WITHOUT COMA: Primary | ICD-10-CM

## 2021-02-22 DIAGNOSIS — N18.6 END STAGE RENAL DISEASE: ICD-10-CM

## 2021-02-23 ENCOUNTER — CLINICAL SUPPORT (OUTPATIENT)
Dept: CARDIOLOGY | Facility: CLINIC | Age: 51
End: 2021-02-23
Payer: MEDICARE

## 2021-02-23 ENCOUNTER — ANTI-COAG VISIT (OUTPATIENT)
Dept: CARDIOLOGY | Facility: CLINIC | Age: 51
End: 2021-02-23
Payer: MEDICARE

## 2021-02-23 DIAGNOSIS — I48.91 ATRIAL FIBRILLATION: ICD-10-CM

## 2021-02-23 DIAGNOSIS — Z79.01 LONG TERM (CURRENT) USE OF ANTICOAGULANTS: Primary | ICD-10-CM

## 2021-02-23 DIAGNOSIS — I27.20 PULMONARY HTN: ICD-10-CM

## 2021-02-23 DIAGNOSIS — I48.0 PAROXYSMAL ATRIAL FIBRILLATION: ICD-10-CM

## 2021-02-23 DIAGNOSIS — I35.0 NONRHEUMATIC AORTIC VALVE STENOSIS: ICD-10-CM

## 2021-02-23 DIAGNOSIS — I10 ESSENTIAL HYPERTENSION: ICD-10-CM

## 2021-02-23 DIAGNOSIS — N18.9 ANEMIA ASSOCIATED WITH CHRONIC RENAL FAILURE: ICD-10-CM

## 2021-02-23 DIAGNOSIS — Z98.890 HISTORY OF RADIOFREQUENCY ABLATION FOR COMPLEX RIGHT ATRIAL ARRHYTHMIA: ICD-10-CM

## 2021-02-23 DIAGNOSIS — N18.6 ESRD (END STAGE RENAL DISEASE) ON DIALYSIS: Chronic | ICD-10-CM

## 2021-02-23 DIAGNOSIS — I05.0 MITRAL VALVE STENOSIS, UNSPECIFIED ETIOLOGY: Primary | ICD-10-CM

## 2021-02-23 DIAGNOSIS — Z99.2 ESRD (END STAGE RENAL DISEASE) ON DIALYSIS: Chronic | ICD-10-CM

## 2021-02-23 DIAGNOSIS — I50.32 CHRONIC DIASTOLIC HEART FAILURE: ICD-10-CM

## 2021-02-23 DIAGNOSIS — D63.1 ANEMIA ASSOCIATED WITH CHRONIC RENAL FAILURE: ICD-10-CM

## 2021-02-23 DIAGNOSIS — Z95.2 HISTORY OF MECHANICAL AORTIC VALVE REPLACEMENT: ICD-10-CM

## 2021-02-23 DIAGNOSIS — Q24.9 ADULT CONGENITAL HEART DISEASE: ICD-10-CM

## 2021-02-23 DIAGNOSIS — I11.0 HYPERTENSIVE CARDIOMEGALY WITH HEART FAILURE: ICD-10-CM

## 2021-02-23 LAB — INR PPP: 3.7 (ref 2–3)

## 2021-02-23 PROCEDURE — 93793 PR ANTICOAGULANT MGMT FOR PT TAKING WARFARIN: ICD-10-PCS | Mod: ,,,

## 2021-02-23 PROCEDURE — 85610 PROTHROMBIN TIME: CPT | Mod: PBBFAC

## 2021-02-23 PROCEDURE — 93793 ANTICOAG MGMT PT WARFARIN: CPT | Mod: ,,,

## 2021-02-23 RX ORDER — DIPHENHYDRAMINE HCL 50 MG
50 CAPSULE ORAL ONCE
Status: CANCELLED | OUTPATIENT
Start: 2021-02-23 | End: 2021-02-23

## 2021-02-23 RX ORDER — SODIUM CHLORIDE 9 MG/ML
INJECTION, SOLUTION INTRAVENOUS CONTINUOUS
Status: CANCELLED | OUTPATIENT
Start: 2021-02-23 | End: 2021-02-23

## 2021-02-24 ENCOUNTER — TELEPHONE (OUTPATIENT)
Dept: CARDIOLOGY | Facility: CLINIC | Age: 51
End: 2021-02-24

## 2021-02-24 DIAGNOSIS — Z95.2 HISTORY OF MECHANICAL AORTIC VALVE REPLACEMENT: Primary | ICD-10-CM

## 2021-03-02 ENCOUNTER — ANTI-COAG VISIT (OUTPATIENT)
Dept: CARDIOLOGY | Facility: CLINIC | Age: 51
End: 2021-03-02
Payer: MEDICARE

## 2021-03-02 ENCOUNTER — LAB VISIT (OUTPATIENT)
Dept: OTOLARYNGOLOGY | Facility: CLINIC | Age: 51
End: 2021-03-02
Payer: MEDICARE

## 2021-03-02 DIAGNOSIS — Z79.01 LONG TERM (CURRENT) USE OF ANTICOAGULANTS: Primary | ICD-10-CM

## 2021-03-02 DIAGNOSIS — I48.91 ATRIAL FIBRILLATION: ICD-10-CM

## 2021-03-02 DIAGNOSIS — I48.91 ATRIAL FIBRILLATION, UNSPECIFIED TYPE: ICD-10-CM

## 2021-03-02 LAB — INR PPP: 3.5 (ref 2–3)

## 2021-03-02 PROCEDURE — U0005 INFEC AGEN DETEC AMPLI PROBE: HCPCS

## 2021-03-02 PROCEDURE — U0003 INFECTIOUS AGENT DETECTION BY NUCLEIC ACID (DNA OR RNA); SEVERE ACUTE RESPIRATORY SYNDROME CORONAVIRUS 2 (SARS-COV-2) (CORONAVIRUS DISEASE [COVID-19]), AMPLIFIED PROBE TECHNIQUE, MAKING USE OF HIGH THROUGHPUT TECHNOLOGIES AS DESCRIBED BY CMS-2020-01-R: HCPCS

## 2021-03-02 PROCEDURE — 93793 ANTICOAG MGMT PT WARFARIN: CPT | Mod: ,,,

## 2021-03-02 PROCEDURE — 93793 PR ANTICOAGULANT MGMT FOR PT TAKING WARFARIN: ICD-10-PCS | Mod: ,,,

## 2021-03-02 PROCEDURE — 85610 PROTHROMBIN TIME: CPT | Mod: PBBFAC

## 2021-03-03 LAB — SARS-COV-2 RNA RESP QL NAA+PROBE: NOT DETECTED

## 2021-03-04 ENCOUNTER — DOCUMENTATION ONLY (OUTPATIENT)
Dept: CARDIOLOGY | Facility: CLINIC | Age: 51
End: 2021-03-04

## 2021-03-04 ENCOUNTER — HOSPITAL ENCOUNTER (OUTPATIENT)
Dept: CARDIOLOGY | Facility: HOSPITAL | Age: 51
Discharge: HOME | End: 2021-03-04
Attending: INTERNAL MEDICINE
Payer: MEDICARE

## 2021-03-04 VITALS
HEIGHT: 74 IN | DIASTOLIC BLOOD PRESSURE: 70 MMHG | WEIGHT: 219 LBS | HEART RATE: 68 BPM | BODY MASS INDEX: 28.11 KG/M2 | SYSTOLIC BLOOD PRESSURE: 130 MMHG

## 2021-03-04 DIAGNOSIS — G47.33 OSA (OBSTRUCTIVE SLEEP APNEA): ICD-10-CM

## 2021-03-04 DIAGNOSIS — I11.0 HYPERTENSIVE CARDIOMEGALY WITH HEART FAILURE: ICD-10-CM

## 2021-03-04 DIAGNOSIS — I10 ESSENTIAL HYPERTENSION: ICD-10-CM

## 2021-03-04 DIAGNOSIS — Z79.01 ANTICOAGULATED: ICD-10-CM

## 2021-03-04 DIAGNOSIS — Z95.2 HISTORY OF MECHANICAL AORTIC VALVE REPLACEMENT: ICD-10-CM

## 2021-03-04 DIAGNOSIS — I50.32 CHRONIC DIASTOLIC HEART FAILURE: ICD-10-CM

## 2021-03-04 DIAGNOSIS — Z99.2 ESRD (END STAGE RENAL DISEASE) ON DIALYSIS: Chronic | ICD-10-CM

## 2021-03-04 DIAGNOSIS — I27.20 PULMONARY HTN: Primary | ICD-10-CM

## 2021-03-04 DIAGNOSIS — Z98.890 HISTORY OF RADIOFREQUENCY ABLATION FOR COMPLEX RIGHT ATRIAL ARRHYTHMIA: ICD-10-CM

## 2021-03-04 DIAGNOSIS — I48.91 ATRIAL FIBRILLATION, UNSPECIFIED TYPE: ICD-10-CM

## 2021-03-04 DIAGNOSIS — N18.9 ANEMIA ASSOCIATED WITH CHRONIC RENAL FAILURE: ICD-10-CM

## 2021-03-04 DIAGNOSIS — D63.1 ANEMIA ASSOCIATED WITH CHRONIC RENAL FAILURE: ICD-10-CM

## 2021-03-04 DIAGNOSIS — N18.6 ESRD (END STAGE RENAL DISEASE) ON DIALYSIS: Chronic | ICD-10-CM

## 2021-03-04 PROBLEM — I05.0: Status: RESOLVED | Noted: 2021-02-09 | Resolved: 2021-03-04

## 2021-03-04 PROBLEM — I05.0 MITRAL VALVE STENOSIS: Status: RESOLVED | Noted: 2021-02-23 | Resolved: 2021-03-04

## 2021-03-04 PROBLEM — I35.0 NONRHEUMATIC AORTIC VALVE STENOSIS: Status: RESOLVED | Noted: 2017-02-04 | Resolved: 2021-03-04

## 2021-03-04 LAB
ASCENDING AORTA: 3.93 CM
AV INDEX (PROSTH): 0.34
AV MEAN GRADIENT: 27 MMHG
AV PEAK GRADIENT: 52 MMHG
AV VALVE AREA: 1.71 CM2
AV VELOCITY RATIO: 0.35
BSA FOR ECHO PROCEDURE: 2.28 M2
CV ECHO LV RWT: 0.51 CM
DOP CALC AO PEAK VEL: 3.6 M/S
DOP CALC AO VTI: 80 CM
DOP CALC LVOT AREA: 5 CM2
DOP CALC LVOT DIAMETER: 2.53 CM
DOP CALC LVOT PEAK VEL: 1.27 M/S
DOP CALC LVOT STROKE VOLUME: 137.02 CM3
DOP CALCLVOT PEAK VEL VTI: 27.27 CM
E WAVE DECELERATION TIME: 218.96 MSEC
E/A RATIO: 2.07
E/E' RATIO: 20.22 M/S
ECHO LV POSTERIOR WALL: 1.3 CM (ref 0.6–1.1)
FRACTIONAL SHORTENING: 41 % (ref 28–44)
HR MV ECHO: 70 BPM
INTERVENTRICULAR SEPTUM: 1.4 CM (ref 0.6–1.1)
IVRT: 42.82 MSEC
LA MAJOR: 6.2 CM
LA MINOR: 6.63 CM
LA WIDTH: 5.04 CM
LEFT ATRIUM SIZE: 4.74 CM
LEFT ATRIUM VOLUME INDEX MOD: 48.3 ML/M2
LEFT ATRIUM VOLUME INDEX: 57.6 ML/M2
LEFT ATRIUM VOLUME MOD: 109.09 CM3
LEFT ATRIUM VOLUME: 130.12 CM3
LEFT INTERNAL DIMENSION IN SYSTOLE: 3 CM (ref 2.1–4)
LEFT VENTRICLE DIASTOLIC VOLUME INDEX: 54.54 ML/M2
LEFT VENTRICLE DIASTOLIC VOLUME: 123.26 ML
LEFT VENTRICLE MASS INDEX: 126 G/M2
LEFT VENTRICLE SYSTOLIC VOLUME INDEX: 15.5 ML/M2
LEFT VENTRICLE SYSTOLIC VOLUME: 34.92 ML
LEFT VENTRICULAR INTERNAL DIMENSION IN DIASTOLE: 5.09 CM (ref 3.5–6)
LEFT VENTRICULAR MASS: 284.19 G
LV LATERAL E/E' RATIO: 18.2 M/S
LV SEPTAL E/E' RATIO: 22.75 M/S
MV A" WAVE DURATION": 20.84 MSEC
MV MEAN GRADIENT: 6 MMHG
MV PEAK A VEL: 0.88 M/S
MV PEAK E VEL: 1.82 M/S
MV PEAK GRADIENT: 22 MMHG
MV STENOSIS PRESSURE HALF TIME: 63.5 MS
MV VALVE AREA P 1/2 METHOD: 3.46 CM2
PISA TR MAX VEL: 3.47 M/S
PULM VEIN S/D RATIO: 0.51
PV PEAK D VEL: 0.65 M/S
PV PEAK S VEL: 0.33 M/S
RA MAJOR: 5 CM
RA PRESSURE: 8 MMHG
RA WIDTH: 4.5 CM
RIGHT VENTRICULAR END-DIASTOLIC DIMENSION: 4.55 CM
RV TISSUE DOPPLER FREE WALL SYSTOLIC VELOCITY 1 (APICAL 4 CHAMBER VIEW): 9.56 CM/S
SINUS: 3.38 CM
STJ: 3.12 CM
TDI LATERAL: 0.1 M/S
TDI SEPTAL: 0.08 M/S
TDI: 0.09 M/S
TR MAX PG: 48 MMHG
TRICUSPID ANNULAR PLANE SYSTOLIC EXCURSION: 1.6 CM
TV REST PULMONARY ARTERY PRESSURE: 56 MMHG

## 2021-03-04 PROCEDURE — 93306 TTE W/DOPPLER COMPLETE: CPT

## 2021-03-04 PROCEDURE — 93306 ECHO (CUPID ONLY): ICD-10-PCS | Mod: 26,,, | Performed by: INTERNAL MEDICINE

## 2021-03-04 PROCEDURE — 93306 TTE W/DOPPLER COMPLETE: CPT | Mod: 26,,, | Performed by: INTERNAL MEDICINE

## 2021-03-05 ENCOUNTER — TELEPHONE (OUTPATIENT)
Dept: HEPATOLOGY | Facility: CLINIC | Age: 51
End: 2021-03-05

## 2021-03-08 ENCOUNTER — TELEPHONE (OUTPATIENT)
Dept: HEPATOLOGY | Facility: CLINIC | Age: 51
End: 2021-03-08

## 2021-03-16 ENCOUNTER — ANTI-COAG VISIT (OUTPATIENT)
Dept: CARDIOLOGY | Facility: CLINIC | Age: 51
End: 2021-03-16
Payer: MEDICARE

## 2021-03-16 DIAGNOSIS — Z79.01 LONG TERM (CURRENT) USE OF ANTICOAGULANTS: Primary | ICD-10-CM

## 2021-03-16 DIAGNOSIS — I48.91 ATRIAL FIBRILLATION, UNSPECIFIED TYPE: ICD-10-CM

## 2021-03-16 LAB — INR PPP: 2.2 (ref 2–3)

## 2021-03-16 PROCEDURE — 93793 PR ANTICOAGULANT MGMT FOR PT TAKING WARFARIN: ICD-10-PCS | Mod: ,,,

## 2021-03-16 PROCEDURE — 85610 PROTHROMBIN TIME: CPT | Mod: PBBFAC

## 2021-03-16 PROCEDURE — 93793 ANTICOAG MGMT PT WARFARIN: CPT | Mod: ,,,

## 2021-04-01 DIAGNOSIS — I49.3 PVC'S (PREMATURE VENTRICULAR CONTRACTIONS): ICD-10-CM

## 2021-04-01 DIAGNOSIS — I10 ESSENTIAL HYPERTENSION: ICD-10-CM

## 2021-04-06 ENCOUNTER — ANTI-COAG VISIT (OUTPATIENT)
Dept: CARDIOLOGY | Facility: CLINIC | Age: 51
End: 2021-04-06
Payer: MEDICARE

## 2021-04-06 ENCOUNTER — TELEPHONE (OUTPATIENT)
Dept: HEPATOLOGY | Facility: CLINIC | Age: 51
End: 2021-04-06

## 2021-04-06 DIAGNOSIS — I48.91 ATRIAL FIBRILLATION, UNSPECIFIED TYPE: ICD-10-CM

## 2021-04-06 DIAGNOSIS — Z79.01 LONG TERM (CURRENT) USE OF ANTICOAGULANTS: Primary | ICD-10-CM

## 2021-04-06 LAB — INR PPP: 2.5 (ref 2–3)

## 2021-04-06 PROCEDURE — 85610 PROTHROMBIN TIME: CPT | Mod: PBBFAC

## 2021-04-06 PROCEDURE — 93793 PR ANTICOAGULANT MGMT FOR PT TAKING WARFARIN: ICD-10-PCS | Mod: ,,,

## 2021-04-06 PROCEDURE — 93793 ANTICOAG MGMT PT WARFARIN: CPT | Mod: ,,,

## 2021-04-06 RX ORDER — DILTIAZEM HYDROCHLORIDE 360 MG/1
360 CAPSULE, EXTENDED RELEASE ORAL DAILY
Qty: 90 CAPSULE | Refills: 1 | Status: SHIPPED | OUTPATIENT
Start: 2021-04-06 | End: 2021-10-14 | Stop reason: SDUPTHER

## 2021-04-06 RX ORDER — CALCIUM ACETATE 667 MG/1
1334 CAPSULE ORAL 3 TIMES DAILY
Qty: 180 CAPSULE | Refills: 0 | Status: SHIPPED | OUTPATIENT
Start: 2021-04-06 | End: 2021-05-11 | Stop reason: SDUPTHER

## 2021-04-15 DIAGNOSIS — Z79.01 LONG TERM (CURRENT) USE OF ANTICOAGULANTS: ICD-10-CM

## 2021-04-15 RX ORDER — WARFARIN 7.5 MG/1
TABLET ORAL
Qty: 35 TABLET | Refills: 6 | Status: SHIPPED | OUTPATIENT
Start: 2021-04-15 | End: 2022-01-04 | Stop reason: SDUPTHER

## 2021-04-15 RX ORDER — WARFARIN 7.5 MG/1
TABLET ORAL
Qty: 35 TABLET | Refills: 2 | Status: SHIPPED | OUTPATIENT
Start: 2021-04-15 | End: 2021-04-15 | Stop reason: SDUPTHER

## 2021-04-19 ENCOUNTER — EPISODE CHANGES (OUTPATIENT)
Dept: TRANSPLANT | Facility: CLINIC | Age: 51
End: 2021-04-19

## 2021-04-27 ENCOUNTER — TELEPHONE (OUTPATIENT)
Dept: NEPHROLOGY | Facility: CLINIC | Age: 51
End: 2021-04-27

## 2021-05-04 ENCOUNTER — CLINICAL SUPPORT (OUTPATIENT)
Dept: PULMONOLOGY | Facility: CLINIC | Age: 51
End: 2021-05-04
Payer: MEDICARE

## 2021-05-04 ENCOUNTER — ANTI-COAG VISIT (OUTPATIENT)
Dept: CARDIOLOGY | Facility: CLINIC | Age: 51
End: 2021-05-04
Payer: MEDICARE

## 2021-05-04 ENCOUNTER — HOSPITAL ENCOUNTER (OUTPATIENT)
Dept: RADIOLOGY | Facility: HOSPITAL | Age: 51
Discharge: HOME | End: 2021-05-04
Attending: INTERNAL MEDICINE
Payer: MEDICARE

## 2021-05-04 ENCOUNTER — OFFICE VISIT (OUTPATIENT)
Dept: PULMONOLOGY | Facility: CLINIC | Age: 51
End: 2021-05-04
Payer: MEDICARE

## 2021-05-04 VITALS
HEIGHT: 74 IN | RESPIRATION RATE: 17 BRPM | HEART RATE: 78 BPM | OXYGEN SATURATION: 96 % | BODY MASS INDEX: 27.71 KG/M2 | WEIGHT: 215.94 LBS | SYSTOLIC BLOOD PRESSURE: 144 MMHG | DIASTOLIC BLOOD PRESSURE: 82 MMHG

## 2021-05-04 VITALS — BODY MASS INDEX: 27.73 KG/M2 | WEIGHT: 216.06 LBS | HEIGHT: 74 IN

## 2021-05-04 DIAGNOSIS — R94.2 ABNORMAL DIFFUSION CAPACITY DETERMINED BY PULMONARY FUNCTION TEST: ICD-10-CM

## 2021-05-04 DIAGNOSIS — J44.89 NOCTURNAL HYPOXEMIA DUE TO OBSTRUCTIVE CHRONIC BRONCHITIS: ICD-10-CM

## 2021-05-04 DIAGNOSIS — J43.9 MIXED RESTRICTIVE AND OBSTRUCTIVE LUNG DISEASE: ICD-10-CM

## 2021-05-04 DIAGNOSIS — I27.9 CHRONIC PULMONARY HEART DISEASE: ICD-10-CM

## 2021-05-04 DIAGNOSIS — I48.91 ATRIAL FIBRILLATION, UNSPECIFIED TYPE: ICD-10-CM

## 2021-05-04 DIAGNOSIS — Z99.2 ESRD (END STAGE RENAL DISEASE) ON DIALYSIS: ICD-10-CM

## 2021-05-04 DIAGNOSIS — I27.20 PULMONARY HTN: Primary | ICD-10-CM

## 2021-05-04 DIAGNOSIS — Z99.2 ESRD (END STAGE RENAL DISEASE) ON DIALYSIS: Chronic | ICD-10-CM

## 2021-05-04 DIAGNOSIS — Z79.01 LONG TERM (CURRENT) USE OF ANTICOAGULANTS: Primary | ICD-10-CM

## 2021-05-04 DIAGNOSIS — J98.4 MIXED RESTRICTIVE AND OBSTRUCTIVE LUNG DISEASE: ICD-10-CM

## 2021-05-04 DIAGNOSIS — G47.36 NOCTURNAL HYPOXEMIA DUE TO OBSTRUCTIVE CHRONIC BRONCHITIS: ICD-10-CM

## 2021-05-04 DIAGNOSIS — N18.6 ESRD (END STAGE RENAL DISEASE) ON DIALYSIS: Chronic | ICD-10-CM

## 2021-05-04 DIAGNOSIS — N18.6 ESRD (END STAGE RENAL DISEASE) ON DIALYSIS: ICD-10-CM

## 2021-05-04 DIAGNOSIS — R91.1 PULMONARY NODULE, LEFT: ICD-10-CM

## 2021-05-04 DIAGNOSIS — I27.20 PULMONARY HTN: ICD-10-CM

## 2021-05-04 DIAGNOSIS — I50.32 CHRONIC DIASTOLIC HEART FAILURE: ICD-10-CM

## 2021-05-04 LAB
ALLENS TEST: ABNORMAL
DELSYS: ABNORMAL
HCO3 UR-SCNC: 24.7 MMOL/L (ref 24–28)
INR PPP: 2.4 (ref 2–3)
MODE: ABNORMAL
PCO2 BLDA: 33.5 MMHG (ref 35–45)
PH SMN: 7.47 [PH] (ref 7.35–7.45)
PO2 BLDA: 97 MMHG (ref 80–100)
POC BE: 1 MMOL/L
POC SATURATED O2: 98 % (ref 95–100)
SAMPLE: ABNORMAL
SITE: ABNORMAL

## 2021-05-04 PROCEDURE — 36600 WITHDRAWAL OF ARTERIAL BLOOD: CPT | Mod: 59,S$PBB,, | Performed by: INTERNAL MEDICINE

## 2021-05-04 PROCEDURE — 99211 OFF/OP EST MAY X REQ PHY/QHP: CPT | Mod: PBBFAC,27,25

## 2021-05-04 PROCEDURE — 71046 XR CHEST PA AND LATERAL: ICD-10-PCS | Mod: 26,,, | Performed by: RADIOLOGY

## 2021-05-04 PROCEDURE — 94618 PULMONARY STRESS TESTING: CPT | Mod: PBBFAC

## 2021-05-04 PROCEDURE — 99214 OFFICE O/P EST MOD 30 MIN: CPT | Mod: 25,S$PBB,, | Performed by: INTERNAL MEDICINE

## 2021-05-04 PROCEDURE — 85610 PROTHROMBIN TIME: CPT | Mod: PBBFAC

## 2021-05-04 PROCEDURE — 99999 PR PBB SHADOW E&M-EST. PATIENT-LVL I: CPT | Mod: PBBFAC,,,

## 2021-05-04 PROCEDURE — 82803 BLOOD GASES ANY COMBINATION: CPT | Mod: PBBFAC

## 2021-05-04 PROCEDURE — 36600 WITHDRAWAL OF ARTERIAL BLOOD: CPT | Mod: PBBFAC

## 2021-05-04 PROCEDURE — 99999 PR PBB SHADOW E&M-EST. PATIENT-LVL V: CPT | Mod: PBBFAC,,, | Performed by: INTERNAL MEDICINE

## 2021-05-04 PROCEDURE — 36600 PR WITHDRAWAL OF ARTERIAL BLOOD: ICD-10-PCS | Mod: 59,S$PBB,, | Performed by: INTERNAL MEDICINE

## 2021-05-04 PROCEDURE — 94618 PULMONARY STRESS TESTING: CPT | Mod: 26,S$PBB,, | Performed by: INTERNAL MEDICINE

## 2021-05-04 PROCEDURE — 99999 PR PBB SHADOW E&M-EST. PATIENT-LVL V: ICD-10-PCS | Mod: PBBFAC,,, | Performed by: INTERNAL MEDICINE

## 2021-05-04 PROCEDURE — 99215 OFFICE O/P EST HI 40 MIN: CPT | Mod: PBBFAC,25 | Performed by: INTERNAL MEDICINE

## 2021-05-04 PROCEDURE — 93793 ANTICOAG MGMT PT WARFARIN: CPT | Mod: ,,,

## 2021-05-04 PROCEDURE — 99214 PR OFFICE/OUTPT VISIT, EST, LEVL IV, 30-39 MIN: ICD-10-PCS | Mod: 25,S$PBB,, | Performed by: INTERNAL MEDICINE

## 2021-05-04 PROCEDURE — 71046 X-RAY EXAM CHEST 2 VIEWS: CPT | Mod: TC

## 2021-05-04 PROCEDURE — 99999 PR PBB SHADOW E&M-EST. PATIENT-LVL I: ICD-10-PCS | Mod: PBBFAC,,,

## 2021-05-04 PROCEDURE — 94618 PULMONARY STRESS TESTING: ICD-10-PCS | Mod: 26,S$PBB,, | Performed by: INTERNAL MEDICINE

## 2021-05-04 PROCEDURE — 71046 X-RAY EXAM CHEST 2 VIEWS: CPT | Mod: 26,,, | Performed by: RADIOLOGY

## 2021-05-04 PROCEDURE — 93793 PR ANTICOAGULANT MGMT FOR PT TAKING WARFARIN: ICD-10-PCS | Mod: ,,,

## 2021-05-04 RX ORDER — LISINOPRIL 40 MG/1
40 TABLET ORAL DAILY
Qty: 30 TABLET | Refills: 6 | Status: CANCELLED | OUTPATIENT
Start: 2021-05-04

## 2021-05-04 RX ORDER — DILTIAZEM HYDROCHLORIDE 360 MG/1
360 CAPSULE, EXTENDED RELEASE ORAL DAILY
COMMUNITY
Start: 2021-04-11 | End: 2021-07-20 | Stop reason: SDUPTHER

## 2021-05-04 RX ORDER — HYDRALAZINE HYDROCHLORIDE 100 MG/1
TABLET, FILM COATED ORAL
Qty: 90 TABLET | Refills: 5 | Status: CANCELLED | OUTPATIENT
Start: 2021-05-04

## 2021-05-07 ENCOUNTER — TELEPHONE (OUTPATIENT)
Dept: PULMONOLOGY | Facility: CLINIC | Age: 51
End: 2021-05-07

## 2021-05-07 DIAGNOSIS — I27.20 PULMONARY HTN: Primary | ICD-10-CM

## 2021-05-11 ENCOUNTER — OFFICE VISIT (OUTPATIENT)
Dept: CARDIOLOGY | Facility: CLINIC | Age: 51
End: 2021-05-11
Payer: MEDICARE

## 2021-05-11 VITALS
DIASTOLIC BLOOD PRESSURE: 90 MMHG | RESPIRATION RATE: 16 BRPM | HEART RATE: 90 BPM | OXYGEN SATURATION: 98 % | SYSTOLIC BLOOD PRESSURE: 150 MMHG | HEIGHT: 74 IN | BODY MASS INDEX: 28.47 KG/M2 | WEIGHT: 221.81 LBS

## 2021-05-11 DIAGNOSIS — Z99.2 ESRD (END STAGE RENAL DISEASE) ON DIALYSIS: Chronic | ICD-10-CM

## 2021-05-11 DIAGNOSIS — I34.2 NONRHEUMATIC MITRAL VALVE STENOSIS: ICD-10-CM

## 2021-05-11 DIAGNOSIS — I27.20 PULMONARY HTN: ICD-10-CM

## 2021-05-11 DIAGNOSIS — I10 ESSENTIAL HYPERTENSION: ICD-10-CM

## 2021-05-11 DIAGNOSIS — I11.0 HYPERTENSIVE CARDIOMEGALY WITH HEART FAILURE: ICD-10-CM

## 2021-05-11 DIAGNOSIS — N18.6 ESRD (END STAGE RENAL DISEASE) ON DIALYSIS: Chronic | ICD-10-CM

## 2021-05-11 DIAGNOSIS — I47.29 NSVT (NONSUSTAINED VENTRICULAR TACHYCARDIA): ICD-10-CM

## 2021-05-11 DIAGNOSIS — G47.33 OSA (OBSTRUCTIVE SLEEP APNEA): ICD-10-CM

## 2021-05-11 DIAGNOSIS — I50.32 CHRONIC DIASTOLIC HEART FAILURE: ICD-10-CM

## 2021-05-11 DIAGNOSIS — Z01.810 PREOP CARDIOVASCULAR EXAM: Primary | ICD-10-CM

## 2021-05-11 DIAGNOSIS — I47.19 ATRIAL TACHYCARDIA: ICD-10-CM

## 2021-05-11 DIAGNOSIS — Z95.2 HISTORY OF MECHANICAL AORTIC VALVE REPLACEMENT: ICD-10-CM

## 2021-05-11 DIAGNOSIS — Z98.890 HISTORY OF RADIOFREQUENCY ABLATION FOR COMPLEX RIGHT ATRIAL ARRHYTHMIA: ICD-10-CM

## 2021-05-11 DIAGNOSIS — Q24.9 ADULT CONGENITAL HEART DISEASE: ICD-10-CM

## 2021-05-11 DIAGNOSIS — I27.9 CHRONIC PULMONARY HEART DISEASE: ICD-10-CM

## 2021-05-11 PROCEDURE — 99999 PR PBB SHADOW E&M-EST. PATIENT-LVL V: CPT | Mod: PBBFAC,,, | Performed by: INTERNAL MEDICINE

## 2021-05-11 PROCEDURE — 99215 OFFICE O/P EST HI 40 MIN: CPT | Mod: S$PBB,,, | Performed by: INTERNAL MEDICINE

## 2021-05-11 PROCEDURE — 99215 PR OFFICE/OUTPT VISIT, EST, LEVL V, 40-54 MIN: ICD-10-PCS | Mod: S$PBB,,, | Performed by: INTERNAL MEDICINE

## 2021-05-11 PROCEDURE — 99999 PR PBB SHADOW E&M-EST. PATIENT-LVL V: ICD-10-PCS | Mod: PBBFAC,,, | Performed by: INTERNAL MEDICINE

## 2021-05-11 PROCEDURE — 99215 OFFICE O/P EST HI 40 MIN: CPT | Mod: PBBFAC | Performed by: INTERNAL MEDICINE

## 2021-05-11 RX ORDER — LISINOPRIL 40 MG/1
40 TABLET ORAL DAILY
Qty: 30 TABLET | Refills: 6 | Status: SHIPPED | OUTPATIENT
Start: 2021-05-11 | End: 2021-12-24 | Stop reason: SDUPTHER

## 2021-05-11 RX ORDER — HYDRALAZINE HYDROCHLORIDE 100 MG/1
TABLET, FILM COATED ORAL
Qty: 90 TABLET | Refills: 5 | Status: SHIPPED | OUTPATIENT
Start: 2021-05-11 | End: 2022-04-12 | Stop reason: SDUPTHER

## 2021-05-11 RX ORDER — PANTOPRAZOLE SODIUM 40 MG/1
TABLET, DELAYED RELEASE ORAL
Qty: 30 TABLET | Refills: 8 | Status: SHIPPED | OUTPATIENT
Start: 2021-05-11 | End: 2023-02-16 | Stop reason: SDUPTHER

## 2021-05-25 ENCOUNTER — HOSPITAL ENCOUNTER (OUTPATIENT)
Dept: RADIOLOGY | Facility: HOSPITAL | Age: 51
Discharge: HOME | End: 2021-05-25
Attending: INTERNAL MEDICINE
Payer: MEDICARE

## 2021-05-25 ENCOUNTER — HOSPITAL ENCOUNTER (OUTPATIENT)
Dept: CARDIOLOGY | Facility: HOSPITAL | Age: 51
Discharge: HOME | End: 2021-05-25
Attending: INTERNAL MEDICINE
Payer: MEDICARE

## 2021-05-25 DIAGNOSIS — Z01.810 PREOP CARDIOVASCULAR EXAM: ICD-10-CM

## 2021-05-25 DIAGNOSIS — I47.19 ATRIAL TACHYCARDIA: ICD-10-CM

## 2021-05-25 DIAGNOSIS — Z95.2 HISTORY OF MECHANICAL AORTIC VALVE REPLACEMENT: ICD-10-CM

## 2021-05-25 DIAGNOSIS — Q24.9 ADULT CONGENITAL HEART DISEASE: ICD-10-CM

## 2021-05-25 DIAGNOSIS — N18.6 ESRD (END STAGE RENAL DISEASE) ON DIALYSIS: Chronic | ICD-10-CM

## 2021-05-25 DIAGNOSIS — I10 ESSENTIAL HYPERTENSION: ICD-10-CM

## 2021-05-25 DIAGNOSIS — Z98.890 HISTORY OF RADIOFREQUENCY ABLATION FOR COMPLEX RIGHT ATRIAL ARRHYTHMIA: ICD-10-CM

## 2021-05-25 DIAGNOSIS — G47.33 OSA (OBSTRUCTIVE SLEEP APNEA): ICD-10-CM

## 2021-05-25 DIAGNOSIS — Z99.2 ESRD (END STAGE RENAL DISEASE) ON DIALYSIS: Chronic | ICD-10-CM

## 2021-05-25 DIAGNOSIS — I47.29 NSVT (NONSUSTAINED VENTRICULAR TACHYCARDIA): ICD-10-CM

## 2021-05-25 LAB
CV STRESS BASE HR: 81 BPM
DIASTOLIC BLOOD PRESSURE: 77 MMHG
NUC REST EJECTION FRACTION: 65
NUC STRESS EJECTION FRACTION: 47 %
OHS CV CPX 85 PERCENT MAX PREDICTED HEART RATE MALE: 145
OHS CV CPX ESTIMATED METS: 1
OHS CV CPX MAX PREDICTED HEART RATE: 170
OHS CV CPX PATIENT IS FEMALE: 0
OHS CV CPX PATIENT IS MALE: 1
OHS CV CPX PEAK DIASTOLIC BLOOD PRESSURE: 77 MMHG
OHS CV CPX PEAK HEAR RATE: 120 BPM
OHS CV CPX PEAK RATE PRESSURE PRODUCT: NORMAL
OHS CV CPX PEAK SYSTOLIC BLOOD PRESSURE: 136 MMHG
OHS CV CPX PERCENT MAX PREDICTED HEART RATE ACHIEVED: 71
OHS CV CPX RATE PRESSURE PRODUCT PRESENTING: NORMAL
STRESS ECHO POST EXERCISE DUR SEC: 55 SECONDS
SYSTOLIC BLOOD PRESSURE: 136 MMHG

## 2021-05-25 PROCEDURE — 78452 HT MUSCLE IMAGE SPECT MULT: CPT | Mod: 26,,, | Performed by: INTERNAL MEDICINE

## 2021-05-25 PROCEDURE — 78452 STRESS TEST WITH MYOCARDIAL PERFUSION (CUPID ONLY): ICD-10-PCS | Mod: 26,,, | Performed by: INTERNAL MEDICINE

## 2021-05-25 PROCEDURE — 93018 STRESS TEST WITH MYOCARDIAL PERFUSION (CUPID ONLY): ICD-10-PCS | Mod: ,,, | Performed by: INTERNAL MEDICINE

## 2021-05-25 PROCEDURE — 93018 CV STRESS TEST I&R ONLY: CPT | Mod: ,,, | Performed by: INTERNAL MEDICINE

## 2021-05-25 PROCEDURE — 63600175 PHARM REV CODE 636 W HCPCS: Performed by: INTERNAL MEDICINE

## 2021-05-25 PROCEDURE — 93017 CV STRESS TEST TRACING ONLY: CPT

## 2021-05-25 PROCEDURE — 78452 HT MUSCLE IMAGE SPECT MULT: CPT

## 2021-05-25 PROCEDURE — 93016 STRESS TEST WITH MYOCARDIAL PERFUSION (CUPID ONLY): ICD-10-PCS | Mod: ,,, | Performed by: INTERNAL MEDICINE

## 2021-05-25 PROCEDURE — 93016 CV STRESS TEST SUPVJ ONLY: CPT | Mod: ,,, | Performed by: INTERNAL MEDICINE

## 2021-05-25 PROCEDURE — A9502 TC99M TETROFOSMIN: HCPCS

## 2021-05-25 RX ORDER — REGADENOSON 0.08 MG/ML
0.4 INJECTION, SOLUTION INTRAVENOUS ONCE
Status: COMPLETED | OUTPATIENT
Start: 2021-05-25 | End: 2021-05-25

## 2021-05-25 RX ADMIN — REGADENOSON 0.4 MG: 0.08 INJECTION, SOLUTION INTRAVENOUS at 09:05

## 2021-05-29 ENCOUNTER — LAB VISIT (OUTPATIENT)
Dept: OTOLARYNGOLOGY | Facility: CLINIC | Age: 51
End: 2021-05-29
Payer: MEDICARE

## 2021-05-29 DIAGNOSIS — I27.20 PULMONARY HTN: ICD-10-CM

## 2021-05-29 PROCEDURE — U0003 INFECTIOUS AGENT DETECTION BY NUCLEIC ACID (DNA OR RNA); SEVERE ACUTE RESPIRATORY SYNDROME CORONAVIRUS 2 (SARS-COV-2) (CORONAVIRUS DISEASE [COVID-19]), AMPLIFIED PROBE TECHNIQUE, MAKING USE OF HIGH THROUGHPUT TECHNOLOGIES AS DESCRIBED BY CMS-2020-01-R: HCPCS | Performed by: INTERNAL MEDICINE

## 2021-05-29 PROCEDURE — U0005 INFEC AGEN DETEC AMPLI PROBE: HCPCS | Performed by: INTERNAL MEDICINE

## 2021-05-30 LAB — SARS-COV-2 RNA RESP QL NAA+PROBE: NOT DETECTED

## 2021-06-01 ENCOUNTER — ANTI-COAG VISIT (OUTPATIENT)
Dept: CARDIOLOGY | Facility: CLINIC | Age: 51
End: 2021-06-01
Payer: MEDICARE

## 2021-06-01 ENCOUNTER — HOSPITAL ENCOUNTER (OUTPATIENT)
Dept: RADIOLOGY | Facility: HOSPITAL | Age: 51
Discharge: HOME | End: 2021-06-01
Attending: INTERNAL MEDICINE
Payer: MEDICARE

## 2021-06-01 ENCOUNTER — CLINICAL SUPPORT (OUTPATIENT)
Dept: PULMONOLOGY | Facility: CLINIC | Age: 51
End: 2021-06-01
Payer: MEDICARE

## 2021-06-01 DIAGNOSIS — Z79.01 LONG TERM (CURRENT) USE OF ANTICOAGULANTS: Primary | ICD-10-CM

## 2021-06-01 DIAGNOSIS — I48.91 ATRIAL FIBRILLATION, UNSPECIFIED TYPE: ICD-10-CM

## 2021-06-01 DIAGNOSIS — Z95.2 HISTORY OF MECHANICAL AORTIC VALVE REPLACEMENT: ICD-10-CM

## 2021-06-01 DIAGNOSIS — J98.4 MIXED RESTRICTIVE AND OBSTRUCTIVE LUNG DISEASE: ICD-10-CM

## 2021-06-01 DIAGNOSIS — J43.9 MIXED RESTRICTIVE AND OBSTRUCTIVE LUNG DISEASE: ICD-10-CM

## 2021-06-01 DIAGNOSIS — N18.6 ESRD (END STAGE RENAL DISEASE) ON DIALYSIS: Chronic | ICD-10-CM

## 2021-06-01 DIAGNOSIS — R91.1 PULMONARY NODULE, LEFT: ICD-10-CM

## 2021-06-01 DIAGNOSIS — Z99.2 ESRD (END STAGE RENAL DISEASE) ON DIALYSIS: Chronic | ICD-10-CM

## 2021-06-01 LAB
BRPFT: ABNORMAL
FEF 25 75 CHG: 1.1 %
FEF 25 75 LLN: 1.61
FEF 25 75 POST REF: 35.7 %
FEF 25 75 PRE REF: 35.4 %
FEF 25 75 REF: 3.41
FET100 CHG: -3 %
FEV1 CHG: -1.5 %
FEV1 FVC CHG: -0.4 %
FEV1 FVC LLN: 68
FEV1 FVC POST REF: 88.2 %
FEV1 FVC PRE REF: 88.6 %
FEV1 FVC REF: 79
FEV1 LLN: 2.85
FEV1 POST REF: 54.8 %
FEV1 PRE REF: 55.6 %
FEV1 REF: 3.78
FVC CHG: -1 %
FVC LLN: 3.67
FVC POST REF: 61.9 %
FVC PRE REF: 62.5 %
FVC REF: 4.81
INR PPP: 2.1 (ref 2–3)
PEF CHG: -2.9 %
PEF LLN: 6.88
PEF POST REF: 66.7 %
PEF PRE REF: 68.6 %
PEF REF: 9.85
POST FEF 25 75: 1.22 L/S (ref 1.61–5.2)
POST FET 100: 10.49 SEC
POST FEV1 FVC: 69.61 % (ref 68.46–89.45)
POST FEV1: 2.07 L (ref 2.85–4.72)
POST FVC: 2.98 L (ref 3.67–5.95)
POST PEF: 6.56 L/S (ref 6.88–12.81)
PRE FEF 25 75: 1.21 L/S (ref 1.61–5.2)
PRE FET 100: 10.81 SEC
PRE FEV1 FVC: 69.92 % (ref 68.46–89.45)
PRE FEV1: 2.1 L (ref 2.85–4.72)
PRE FVC: 3.01 L (ref 3.67–5.95)
PRE PEF: 6.76 L/S (ref 6.88–12.81)

## 2021-06-01 PROCEDURE — 94060 EVALUATION OF WHEEZING: CPT | Mod: 26,S$PBB,, | Performed by: INTERNAL MEDICINE

## 2021-06-01 PROCEDURE — 94060 EVALUATION OF WHEEZING: CPT | Mod: PBBFAC

## 2021-06-01 PROCEDURE — 99999 PR PBB SHADOW E&M-EST. PATIENT-LVL I: ICD-10-PCS | Mod: PBBFAC,,,

## 2021-06-01 PROCEDURE — 94060 PR EVAL OF BRONCHOSPASM: ICD-10-PCS | Mod: 26,S$PBB,, | Performed by: INTERNAL MEDICINE

## 2021-06-01 PROCEDURE — 94762 N-INVAS EAR/PLS OXIMTRY CONT: CPT | Mod: PBBFAC

## 2021-06-01 PROCEDURE — 93793 ANTICOAG MGMT PT WARFARIN: CPT | Mod: ,,,

## 2021-06-01 PROCEDURE — 93793 PR ANTICOAGULANT MGMT FOR PT TAKING WARFARIN: ICD-10-PCS | Mod: ,,,

## 2021-06-01 PROCEDURE — 85610 PROTHROMBIN TIME: CPT | Mod: PBBFAC

## 2021-06-01 PROCEDURE — 99211 OFF/OP EST MAY X REQ PHY/QHP: CPT | Mod: PBBFAC,25

## 2021-06-01 PROCEDURE — 99999 PR PBB SHADOW E&M-EST. PATIENT-LVL I: CPT | Mod: PBBFAC,,,

## 2021-06-01 PROCEDURE — 71250 CT THORAX DX C-: CPT | Mod: TC

## 2021-06-03 ENCOUNTER — TELEPHONE (OUTPATIENT)
Dept: PULMONOLOGY | Facility: CLINIC | Age: 51
End: 2021-06-03

## 2021-06-10 ENCOUNTER — TELEPHONE (OUTPATIENT)
Dept: TRANSPLANT | Facility: CLINIC | Age: 51
End: 2021-06-10

## 2021-06-10 ENCOUNTER — OFFICE VISIT (OUTPATIENT)
Dept: SLEEP MEDICINE | Facility: CLINIC | Age: 51
End: 2021-06-10
Payer: MEDICARE

## 2021-06-10 VITALS
HEIGHT: 74 IN | WEIGHT: 218.94 LBS | BODY MASS INDEX: 28.1 KG/M2 | RESPIRATION RATE: 18 BRPM | HEART RATE: 77 BPM | DIASTOLIC BLOOD PRESSURE: 76 MMHG | SYSTOLIC BLOOD PRESSURE: 148 MMHG | OXYGEN SATURATION: 96 %

## 2021-06-10 DIAGNOSIS — J44.89 NOCTURNAL HYPOXEMIA DUE TO OBSTRUCTIVE CHRONIC BRONCHITIS: ICD-10-CM

## 2021-06-10 DIAGNOSIS — G47.36 NOCTURNAL HYPOXEMIA DUE TO OBSTRUCTIVE CHRONIC BRONCHITIS: ICD-10-CM

## 2021-06-10 DIAGNOSIS — J98.4 MIXED RESTRICTIVE AND OBSTRUCTIVE LUNG DISEASE: ICD-10-CM

## 2021-06-10 DIAGNOSIS — I27.9 CHRONIC PULMONARY HEART DISEASE: ICD-10-CM

## 2021-06-10 DIAGNOSIS — J43.9 MIXED RESTRICTIVE AND OBSTRUCTIVE LUNG DISEASE: ICD-10-CM

## 2021-06-10 DIAGNOSIS — I27.20 PULMONARY HTN: Primary | ICD-10-CM

## 2021-06-10 DIAGNOSIS — R94.2 ABNORMAL DIFFUSION CAPACITY DETERMINED BY PULMONARY FUNCTION TEST: ICD-10-CM

## 2021-06-10 DIAGNOSIS — N18.6 ESRD (END STAGE RENAL DISEASE) ON DIALYSIS: Chronic | ICD-10-CM

## 2021-06-10 DIAGNOSIS — Z99.2 ESRD (END STAGE RENAL DISEASE) ON DIALYSIS: Chronic | ICD-10-CM

## 2021-06-10 DIAGNOSIS — R91.1 PULMONARY NODULE, LEFT: ICD-10-CM

## 2021-06-10 PROCEDURE — 99999 PR PBB SHADOW E&M-EST. PATIENT-LVL V: CPT | Mod: PBBFAC,,, | Performed by: INTERNAL MEDICINE

## 2021-06-10 PROCEDURE — 99999 PR PBB SHADOW E&M-EST. PATIENT-LVL V: ICD-10-PCS | Mod: PBBFAC,,, | Performed by: INTERNAL MEDICINE

## 2021-06-10 PROCEDURE — 99215 OFFICE O/P EST HI 40 MIN: CPT | Mod: PBBFAC | Performed by: INTERNAL MEDICINE

## 2021-06-10 PROCEDURE — 99214 PR OFFICE/OUTPT VISIT, EST, LEVL IV, 30-39 MIN: ICD-10-PCS | Mod: S$PBB,,, | Performed by: INTERNAL MEDICINE

## 2021-06-10 PROCEDURE — 99214 OFFICE O/P EST MOD 30 MIN: CPT | Mod: S$PBB,,, | Performed by: INTERNAL MEDICINE

## 2021-06-28 ENCOUNTER — TELEPHONE (OUTPATIENT)
Dept: TRANSPLANT | Facility: CLINIC | Age: 51
End: 2021-06-28

## 2021-06-28 ENCOUNTER — TELEPHONE (OUTPATIENT)
Dept: CARDIOLOGY | Facility: CLINIC | Age: 51
End: 2021-06-28

## 2021-06-29 ENCOUNTER — ANTI-COAG VISIT (OUTPATIENT)
Dept: CARDIOLOGY | Facility: CLINIC | Age: 51
End: 2021-06-29
Payer: MEDICARE

## 2021-06-29 DIAGNOSIS — Z79.01 LONG TERM (CURRENT) USE OF ANTICOAGULANTS: Primary | ICD-10-CM

## 2021-06-29 DIAGNOSIS — I48.91 ATRIAL FIBRILLATION, UNSPECIFIED TYPE: ICD-10-CM

## 2021-06-29 LAB — INR PPP: 2.1 (ref 2–3)

## 2021-06-29 PROCEDURE — 85610 PROTHROMBIN TIME: CPT | Mod: PBBFAC

## 2021-06-29 PROCEDURE — 93793 ANTICOAG MGMT PT WARFARIN: CPT | Mod: ,,,

## 2021-06-29 PROCEDURE — 93793 PR ANTICOAGULANT MGMT FOR PT TAKING WARFARIN: ICD-10-PCS | Mod: ,,,

## 2021-07-01 ENCOUNTER — PATIENT MESSAGE (OUTPATIENT)
Dept: ADMINISTRATIVE | Facility: OTHER | Age: 51
End: 2021-07-01

## 2021-07-01 ENCOUNTER — OFFICE VISIT (OUTPATIENT)
Dept: CARDIOLOGY | Facility: CLINIC | Age: 51
End: 2021-07-01
Payer: MEDICARE

## 2021-07-01 ENCOUNTER — LAB VISIT (OUTPATIENT)
Dept: LAB | Facility: HOSPITAL | Age: 51
End: 2021-07-01
Attending: INTERNAL MEDICINE
Payer: MEDICARE

## 2021-07-01 VITALS
WEIGHT: 216.25 LBS | RESPIRATION RATE: 16 BRPM | HEART RATE: 80 BPM | BODY MASS INDEX: 27.75 KG/M2 | OXYGEN SATURATION: 97 % | DIASTOLIC BLOOD PRESSURE: 68 MMHG | HEIGHT: 74 IN | SYSTOLIC BLOOD PRESSURE: 108 MMHG

## 2021-07-01 DIAGNOSIS — Q24.9 ADULT CONGENITAL HEART DISEASE: ICD-10-CM

## 2021-07-01 DIAGNOSIS — I50.32 CHRONIC DIASTOLIC HEART FAILURE: Primary | ICD-10-CM

## 2021-07-01 DIAGNOSIS — I27.20 PULMONARY HYPERTENSION: ICD-10-CM

## 2021-07-01 DIAGNOSIS — I27.9 CHRONIC PULMONARY HEART DISEASE: ICD-10-CM

## 2021-07-01 DIAGNOSIS — Z99.2 ESRD (END STAGE RENAL DISEASE) ON DIALYSIS: Chronic | ICD-10-CM

## 2021-07-01 DIAGNOSIS — I47.29 NSVT (NONSUSTAINED VENTRICULAR TACHYCARDIA): ICD-10-CM

## 2021-07-01 DIAGNOSIS — Z98.890 HISTORY OF RADIOFREQUENCY ABLATION FOR COMPLEX RIGHT ATRIAL ARRHYTHMIA: ICD-10-CM

## 2021-07-01 DIAGNOSIS — I47.19 ATRIAL TACHYCARDIA: ICD-10-CM

## 2021-07-01 DIAGNOSIS — N18.6 ESRD (END STAGE RENAL DISEASE) ON DIALYSIS: Chronic | ICD-10-CM

## 2021-07-01 DIAGNOSIS — I11.0 HYPERTENSIVE CARDIOMEGALY WITH HEART FAILURE: ICD-10-CM

## 2021-07-01 DIAGNOSIS — Z95.2 HISTORY OF MECHANICAL AORTIC VALVE REPLACEMENT: ICD-10-CM

## 2021-07-01 DIAGNOSIS — Z01.810 PREOP CARDIOVASCULAR EXAM: ICD-10-CM

## 2021-07-01 PROCEDURE — 99214 PR OFFICE/OUTPT VISIT, EST, LEVL IV, 30-39 MIN: ICD-10-PCS | Mod: S$PBB,,, | Performed by: INTERNAL MEDICINE

## 2021-07-01 PROCEDURE — 99214 OFFICE O/P EST MOD 30 MIN: CPT | Mod: S$PBB,,, | Performed by: INTERNAL MEDICINE

## 2021-07-01 PROCEDURE — 83880 ASSAY OF NATRIURETIC PEPTIDE: CPT | Performed by: INTERNAL MEDICINE

## 2021-07-01 PROCEDURE — 36415 COLL VENOUS BLD VENIPUNCTURE: CPT | Performed by: INTERNAL MEDICINE

## 2021-07-01 PROCEDURE — 99999 PR PBB SHADOW E&M-EST. PATIENT-LVL V: CPT | Mod: PBBFAC,,, | Performed by: INTERNAL MEDICINE

## 2021-07-01 PROCEDURE — 99215 OFFICE O/P EST HI 40 MIN: CPT | Mod: PBBFAC | Performed by: INTERNAL MEDICINE

## 2021-07-01 PROCEDURE — 99999 PR PBB SHADOW E&M-EST. PATIENT-LVL V: ICD-10-PCS | Mod: PBBFAC,,, | Performed by: INTERNAL MEDICINE

## 2021-07-02 LAB — BNP SERPL-MCNC: 213 PG/ML (ref 0–99)

## 2021-07-11 DIAGNOSIS — I35.0 NONRHEUMATIC AORTIC VALVE STENOSIS: ICD-10-CM

## 2021-07-11 DIAGNOSIS — I50.42 CHRONIC COMBINED SYSTOLIC AND DIASTOLIC HEART FAILURE: ICD-10-CM

## 2021-07-11 DIAGNOSIS — Z95.2 S/P AVR (AORTIC VALVE REPLACEMENT): ICD-10-CM

## 2021-07-11 DIAGNOSIS — Z98.890 HISTORY OF RADIOFREQUENCY ABLATION FOR COMPLEX RIGHT ATRIAL ARRHYTHMIA: ICD-10-CM

## 2021-07-11 DIAGNOSIS — N18.6 ESRD (END STAGE RENAL DISEASE): ICD-10-CM

## 2021-07-11 DIAGNOSIS — I47.19 ATRIAL TACHYCARDIA: ICD-10-CM

## 2021-07-11 DIAGNOSIS — Q24.9 ADULT CONGENITAL HEART DISEASE: ICD-10-CM

## 2021-07-11 DIAGNOSIS — I10 UNCONTROLLED HYPERTENSION: ICD-10-CM

## 2021-07-11 DIAGNOSIS — I42.8 NONISCHEMIC CARDIOMYOPATHY: ICD-10-CM

## 2021-07-11 RX ORDER — HYDROXYZINE PAMOATE 25 MG/1
25 CAPSULE ORAL DAILY
Qty: 30 CAPSULE | Refills: 5 | Status: CANCELLED | OUTPATIENT
Start: 2021-07-11

## 2021-07-11 RX ORDER — AMLODIPINE BESYLATE 10 MG/1
10 TABLET ORAL DAILY
Qty: 30 TABLET | Refills: 10 | Status: CANCELLED | OUTPATIENT
Start: 2021-07-11

## 2021-07-11 RX ORDER — CALCIUM ACETATE 667 MG/1
1334 CAPSULE ORAL 3 TIMES DAILY
Qty: 180 CAPSULE | Refills: 0 | Status: CANCELLED | OUTPATIENT
Start: 2021-07-11 | End: 2021-08-10

## 2021-07-15 ENCOUNTER — OFFICE VISIT (OUTPATIENT)
Dept: UROLOGY | Facility: CLINIC | Age: 51
End: 2021-07-15
Payer: MEDICARE

## 2021-07-15 VITALS
WEIGHT: 220 LBS | BODY MASS INDEX: 28.23 KG/M2 | SYSTOLIC BLOOD PRESSURE: 106 MMHG | HEART RATE: 75 BPM | HEIGHT: 74 IN | DIASTOLIC BLOOD PRESSURE: 60 MMHG | TEMPERATURE: 98 F

## 2021-07-15 DIAGNOSIS — R31.0 GROSS HEMATURIA: Primary | ICD-10-CM

## 2021-07-15 DIAGNOSIS — N28.1 RENAL CYST: ICD-10-CM

## 2021-07-15 DIAGNOSIS — N52.9 ERECTILE DYSFUNCTION, UNSPECIFIED ERECTILE DYSFUNCTION TYPE: ICD-10-CM

## 2021-07-15 PROCEDURE — 99213 OFFICE O/P EST LOW 20 MIN: CPT | Mod: S$PBB,,, | Performed by: UROLOGY

## 2021-07-15 PROCEDURE — 99999 PR PBB SHADOW E&M-EST. PATIENT-LVL V: CPT | Mod: PBBFAC,,, | Performed by: UROLOGY

## 2021-07-15 PROCEDURE — 99215 OFFICE O/P EST HI 40 MIN: CPT | Mod: PBBFAC | Performed by: UROLOGY

## 2021-07-15 PROCEDURE — 99999 PR PBB SHADOW E&M-EST. PATIENT-LVL V: ICD-10-PCS | Mod: PBBFAC,,, | Performed by: UROLOGY

## 2021-07-15 PROCEDURE — 99213 PR OFFICE/OUTPT VISIT, EST, LEVL III, 20-29 MIN: ICD-10-PCS | Mod: S$PBB,,, | Performed by: UROLOGY

## 2021-07-19 ENCOUNTER — TELEPHONE (OUTPATIENT)
Dept: RADIOLOGY | Facility: HOSPITAL | Age: 51
End: 2021-07-19

## 2021-07-20 ENCOUNTER — HOSPITAL ENCOUNTER (OUTPATIENT)
Dept: RADIOLOGY | Facility: HOSPITAL | Age: 51
Discharge: HOME | End: 2021-07-20
Attending: NURSE PRACTITIONER
Payer: MEDICARE

## 2021-07-20 ENCOUNTER — OFFICE VISIT (OUTPATIENT)
Dept: TRANSPLANT | Facility: CLINIC | Age: 51
End: 2021-07-20
Payer: MEDICARE

## 2021-07-20 VITALS
HEIGHT: 74 IN | DIASTOLIC BLOOD PRESSURE: 68 MMHG | SYSTOLIC BLOOD PRESSURE: 116 MMHG | BODY MASS INDEX: 28.21 KG/M2 | HEART RATE: 78 BPM | WEIGHT: 219.81 LBS

## 2021-07-20 DIAGNOSIS — I10 ESSENTIAL HYPERTENSION: ICD-10-CM

## 2021-07-20 DIAGNOSIS — I50.32 CHRONIC DIASTOLIC HEART FAILURE: ICD-10-CM

## 2021-07-20 DIAGNOSIS — D50.9 IRON DEFICIENCY ANEMIA, UNSPECIFIED IRON DEFICIENCY ANEMIA TYPE: ICD-10-CM

## 2021-07-20 DIAGNOSIS — G47.33 OSA (OBSTRUCTIVE SLEEP APNEA): ICD-10-CM

## 2021-07-20 DIAGNOSIS — Q24.9 ADULT CONGENITAL HEART DISEASE: Primary | ICD-10-CM

## 2021-07-20 DIAGNOSIS — I27.20 PULMONARY HYPERTENSION: ICD-10-CM

## 2021-07-20 DIAGNOSIS — K74.60 CHRONIC HEPATITIS B WITH CIRRHOSIS: ICD-10-CM

## 2021-07-20 DIAGNOSIS — I27.9 CHRONIC PULMONARY HEART DISEASE: ICD-10-CM

## 2021-07-20 DIAGNOSIS — I27.20 PULMONARY HTN: ICD-10-CM

## 2021-07-20 DIAGNOSIS — Z95.2 HISTORY OF MECHANICAL AORTIC VALVE REPLACEMENT: ICD-10-CM

## 2021-07-20 DIAGNOSIS — B18.1 CHRONIC HEPATITIS B WITH CIRRHOSIS: ICD-10-CM

## 2021-07-20 DIAGNOSIS — B18.1 CHRONIC VIRAL HEPATITIS B WITHOUT DELTA AGENT AND WITHOUT COMA: ICD-10-CM

## 2021-07-20 PROCEDURE — 99999 PR PBB SHADOW E&M-EST. PATIENT-LVL II: CPT | Mod: PBBFAC,,, | Performed by: INTERNAL MEDICINE

## 2021-07-20 PROCEDURE — 99999 PR PBB SHADOW E&M-EST. PATIENT-LVL II: ICD-10-PCS | Mod: PBBFAC,,, | Performed by: INTERNAL MEDICINE

## 2021-07-20 PROCEDURE — 76700 US EXAM ABDOM COMPLETE: CPT | Mod: TC

## 2021-07-20 PROCEDURE — 76700 US ABDOMEN COMPLETE: ICD-10-PCS | Mod: 26,,, | Performed by: RADIOLOGY

## 2021-07-20 PROCEDURE — 99214 PR OFFICE/OUTPT VISIT, EST, LEVL IV, 30-39 MIN: ICD-10-PCS | Mod: S$PBB,,, | Performed by: INTERNAL MEDICINE

## 2021-07-20 PROCEDURE — 99214 OFFICE O/P EST MOD 30 MIN: CPT | Mod: S$PBB,,, | Performed by: INTERNAL MEDICINE

## 2021-07-20 PROCEDURE — 99212 OFFICE O/P EST SF 10 MIN: CPT | Mod: PBBFAC | Performed by: INTERNAL MEDICINE

## 2021-07-20 PROCEDURE — 76700 US EXAM ABDOM COMPLETE: CPT | Mod: 26,,, | Performed by: RADIOLOGY

## 2021-07-26 ENCOUNTER — TELEPHONE (OUTPATIENT)
Dept: UROLOGY | Facility: CLINIC | Age: 51
End: 2021-07-26

## 2021-07-26 DIAGNOSIS — R31.0 GROSS HEMATURIA: ICD-10-CM

## 2021-07-26 DIAGNOSIS — N28.1 RENAL CYST: Primary | ICD-10-CM

## 2021-07-27 ENCOUNTER — ANTI-COAG VISIT (OUTPATIENT)
Dept: CARDIOLOGY | Facility: CLINIC | Age: 51
End: 2021-07-27
Payer: MEDICARE

## 2021-07-27 ENCOUNTER — OFFICE VISIT (OUTPATIENT)
Dept: HEPATOLOGY | Facility: CLINIC | Age: 51
End: 2021-07-27
Payer: MEDICARE

## 2021-07-27 VITALS
HEART RATE: 72 BPM | HEIGHT: 74 IN | SYSTOLIC BLOOD PRESSURE: 122 MMHG | BODY MASS INDEX: 28.32 KG/M2 | DIASTOLIC BLOOD PRESSURE: 70 MMHG | WEIGHT: 220.69 LBS

## 2021-07-27 DIAGNOSIS — K74.60 HEPATIC CIRRHOSIS, UNSPECIFIED HEPATIC CIRRHOSIS TYPE, UNSPECIFIED WHETHER ASCITES PRESENT: ICD-10-CM

## 2021-07-27 DIAGNOSIS — B18.1 CHRONIC VIRAL HEPATITIS B WITHOUT DELTA AGENT AND WITHOUT COMA: Primary | ICD-10-CM

## 2021-07-27 DIAGNOSIS — I48.91 ATRIAL FIBRILLATION, UNSPECIFIED TYPE: ICD-10-CM

## 2021-07-27 DIAGNOSIS — Z79.01 LONG TERM (CURRENT) USE OF ANTICOAGULANTS: Primary | ICD-10-CM

## 2021-07-27 LAB — INR PPP: 1.8 (ref 2–3)

## 2021-07-27 PROCEDURE — 93793 ANTICOAG MGMT PT WARFARIN: CPT | Mod: ,,,

## 2021-07-27 PROCEDURE — 99214 PR OFFICE/OUTPT VISIT, EST, LEVL IV, 30-39 MIN: ICD-10-PCS | Mod: S$PBB,,, | Performed by: NURSE PRACTITIONER

## 2021-07-27 PROCEDURE — 99215 OFFICE O/P EST HI 40 MIN: CPT | Mod: PBBFAC | Performed by: NURSE PRACTITIONER

## 2021-07-27 PROCEDURE — 99214 OFFICE O/P EST MOD 30 MIN: CPT | Mod: S$PBB,,, | Performed by: NURSE PRACTITIONER

## 2021-07-27 PROCEDURE — 85610 PROTHROMBIN TIME: CPT | Mod: PBBFAC

## 2021-07-27 PROCEDURE — 99999 PR PBB SHADOW E&M-EST. PATIENT-LVL V: CPT | Mod: PBBFAC,,, | Performed by: NURSE PRACTITIONER

## 2021-07-27 PROCEDURE — 99999 PR PBB SHADOW E&M-EST. PATIENT-LVL V: ICD-10-PCS | Mod: PBBFAC,,, | Performed by: NURSE PRACTITIONER

## 2021-07-27 PROCEDURE — 93793 PR ANTICOAGULANT MGMT FOR PT TAKING WARFARIN: ICD-10-PCS | Mod: ,,,

## 2021-07-28 PROBLEM — K74.60 HEPATIC CIRRHOSIS: Status: ACTIVE | Noted: 2021-07-28

## 2021-07-30 ENCOUNTER — TELEPHONE (OUTPATIENT)
Dept: UROLOGY | Facility: CLINIC | Age: 51
End: 2021-07-30

## 2021-07-30 ENCOUNTER — HOSPITAL ENCOUNTER (OUTPATIENT)
Dept: RADIOLOGY | Facility: HOSPITAL | Age: 51
Discharge: HOME | End: 2021-07-30
Attending: UROLOGY
Payer: MEDICARE

## 2021-07-30 DIAGNOSIS — R31.0 GROSS HEMATURIA: ICD-10-CM

## 2021-07-30 DIAGNOSIS — N28.1 RENAL CYST: ICD-10-CM

## 2021-07-30 PROCEDURE — 74170 CT ABD WO CNTRST FLWD CNTRST: CPT | Mod: TC

## 2021-07-30 PROCEDURE — 25500020 PHARM REV CODE 255: Performed by: UROLOGY

## 2021-07-30 RX ADMIN — IOHEXOL 80 ML: 350 INJECTION, SOLUTION INTRAVENOUS at 10:07

## 2021-08-09 ENCOUNTER — TELEPHONE (OUTPATIENT)
Dept: TRANSPLANT | Facility: CLINIC | Age: 51
End: 2021-08-09

## 2021-08-09 DIAGNOSIS — Z01.818 PREOP TESTING: ICD-10-CM

## 2021-08-10 ENCOUNTER — LAB VISIT (OUTPATIENT)
Dept: PRIMARY CARE CLINIC | Facility: CLINIC | Age: 51
End: 2021-08-10
Payer: MEDICARE

## 2021-08-10 ENCOUNTER — ANTI-COAG VISIT (OUTPATIENT)
Dept: CARDIOLOGY | Facility: CLINIC | Age: 51
End: 2021-08-10
Payer: MEDICARE

## 2021-08-10 DIAGNOSIS — Z01.818 PREOP TESTING: ICD-10-CM

## 2021-08-10 DIAGNOSIS — Z79.01 LONG TERM (CURRENT) USE OF ANTICOAGULANTS: Primary | ICD-10-CM

## 2021-08-10 DIAGNOSIS — I48.91 ATRIAL FIBRILLATION, UNSPECIFIED TYPE: ICD-10-CM

## 2021-08-10 LAB
INR PPP: 2.6 (ref 2–3)
SARS-COV-2 RNA RESP QL NAA+PROBE: NOT DETECTED

## 2021-08-10 PROCEDURE — 93793 ANTICOAG MGMT PT WARFARIN: CPT | Mod: ,,,

## 2021-08-10 PROCEDURE — U0005 INFEC AGEN DETEC AMPLI PROBE: HCPCS | Performed by: INTERNAL MEDICINE

## 2021-08-10 PROCEDURE — 93793 PR ANTICOAGULANT MGMT FOR PT TAKING WARFARIN: ICD-10-PCS | Mod: ,,,

## 2021-08-10 PROCEDURE — 85610 PROTHROMBIN TIME: CPT | Mod: PBBFAC

## 2021-08-10 PROCEDURE — U0003 INFECTIOUS AGENT DETECTION BY NUCLEIC ACID (DNA OR RNA); SEVERE ACUTE RESPIRATORY SYNDROME CORONAVIRUS 2 (SARS-COV-2) (CORONAVIRUS DISEASE [COVID-19]), AMPLIFIED PROBE TECHNIQUE, MAKING USE OF HIGH THROUGHPUT TECHNOLOGIES AS DESCRIBED BY CMS-2020-01-R: HCPCS | Performed by: INTERNAL MEDICINE

## 2021-08-12 ENCOUNTER — HOSPITAL ENCOUNTER (OUTPATIENT)
Facility: HOSPITAL | Age: 51
Discharge: HOME | End: 2021-08-12
Attending: INTERNAL MEDICINE | Admitting: INTERNAL MEDICINE
Payer: MEDICARE

## 2021-08-12 ENCOUNTER — HOSPITAL ENCOUNTER (OUTPATIENT)
Dept: PULMONOLOGY | Facility: CLINIC | Age: 51
Discharge: HOME | End: 2021-08-12
Payer: MEDICARE

## 2021-08-12 VITALS
WEIGHT: 218 LBS | BODY MASS INDEX: 27.98 KG/M2 | DIASTOLIC BLOOD PRESSURE: 80 MMHG | HEART RATE: 69 BPM | SYSTOLIC BLOOD PRESSURE: 153 MMHG | OXYGEN SATURATION: 95 % | TEMPERATURE: 99 F | HEIGHT: 74 IN | RESPIRATION RATE: 20 BRPM

## 2021-08-12 VITALS — HEIGHT: 73 IN | BODY MASS INDEX: 28.89 KG/M2 | WEIGHT: 218 LBS

## 2021-08-12 DIAGNOSIS — I27.20 PULMONARY HYPERTENSION: ICD-10-CM

## 2021-08-12 DIAGNOSIS — I27.20 PULMONARY HTN: ICD-10-CM

## 2021-08-12 LAB
ALBUMIN SERPL BCP-MCNC: 3.3 G/DL (ref 3.5–5.2)
ALP SERPL-CCNC: 81 U/L (ref 55–135)
ALT SERPL W/O P-5'-P-CCNC: 19 U/L (ref 10–44)
ANION GAP SERPL CALC-SCNC: 15 MMOL/L (ref 8–16)
APTT BLDCRRT: 40.4 SEC (ref 21–32)
AST SERPL-CCNC: 17 U/L (ref 10–40)
BASOPHILS # BLD AUTO: 0.01 K/UL (ref 0–0.2)
BASOPHILS NFR BLD: 0.1 % (ref 0–1.9)
BILIRUB SERPL-MCNC: 0.5 MG/DL (ref 0.1–1)
BUN SERPL-MCNC: 49 MG/DL (ref 6–20)
CALCIUM SERPL-MCNC: 10 MG/DL (ref 8.7–10.5)
CHLORIDE SERPL-SCNC: 98 MMOL/L (ref 95–110)
CO2 SERPL-SCNC: 25 MMOL/L (ref 23–29)
CREAT SERPL-MCNC: 13.7 MG/DL (ref 0.5–1.4)
DIFFERENTIAL METHOD: ABNORMAL
EOSINOPHIL # BLD AUTO: 0.3 K/UL (ref 0–0.5)
EOSINOPHIL NFR BLD: 4.8 % (ref 0–8)
ERYTHROCYTE [DISTWIDTH] IN BLOOD BY AUTOMATED COUNT: 13.3 % (ref 11.5–14.5)
EST. GFR  (AFRICAN AMERICAN): 4.3 ML/MIN/1.73 M^2
EST. GFR  (NON AFRICAN AMERICAN): 3.7 ML/MIN/1.73 M^2
GLUCOSE SERPL-MCNC: 90 MG/DL (ref 70–110)
HCT VFR BLD AUTO: 35.3 % (ref 40–54)
HGB BLD-MCNC: 11.8 G/DL (ref 14–18)
IMM GRANULOCYTES # BLD AUTO: 0.01 K/UL (ref 0–0.04)
IMM GRANULOCYTES NFR BLD AUTO: 0.1 % (ref 0–0.5)
INR PPP: 2 (ref 0.8–1.2)
LYMPHOCYTES # BLD AUTO: 1.4 K/UL (ref 1–4.8)
LYMPHOCYTES NFR BLD: 20.8 % (ref 18–48)
MAGNESIUM SERPL-MCNC: 2.3 MG/DL (ref 1.6–2.6)
MCH RBC QN AUTO: 35.2 PG (ref 27–31)
MCHC RBC AUTO-ENTMCNC: 33.4 G/DL (ref 32–36)
MCV RBC AUTO: 105 FL (ref 82–98)
MONOCYTES # BLD AUTO: 0.9 K/UL (ref 0.3–1)
MONOCYTES NFR BLD: 13 % (ref 4–15)
NEUTROPHILS # BLD AUTO: 4.2 K/UL (ref 1.8–7.7)
NEUTROPHILS NFR BLD: 61.2 % (ref 38–73)
NRBC BLD-RTO: 0 /100 WBC
PLATELET # BLD AUTO: 144 K/UL (ref 150–450)
PMV BLD AUTO: 11.3 FL (ref 9.2–12.9)
POTASSIUM SERPL-SCNC: 5.4 MMOL/L (ref 3.5–5.1)
PROT SERPL-MCNC: 8.4 G/DL (ref 6–8.4)
PROTHROMBIN TIME: 21.1 SEC (ref 9–12.5)
RBC # BLD AUTO: 3.35 M/UL (ref 4.6–6.2)
SODIUM SERPL-SCNC: 138 MMOL/L (ref 136–145)
WBC # BLD AUTO: 6.84 K/UL (ref 3.9–12.7)

## 2021-08-12 PROCEDURE — 94618 PULMONARY STRESS TESTING: CPT | Mod: PBBFAC | Performed by: INTERNAL MEDICINE

## 2021-08-12 PROCEDURE — C1751 CATH, INF, PER/CENT/MIDLINE: HCPCS | Performed by: INTERNAL MEDICINE

## 2021-08-12 PROCEDURE — 85025 COMPLETE CBC W/AUTO DIFF WBC: CPT | Performed by: INTERNAL MEDICINE

## 2021-08-12 PROCEDURE — 93451 RIGHT HEART CATH: CPT | Performed by: INTERNAL MEDICINE

## 2021-08-12 PROCEDURE — C1894 INTRO/SHEATH, NON-LASER: HCPCS | Performed by: INTERNAL MEDICINE

## 2021-08-12 PROCEDURE — 94618 PULMONARY STRESS TESTING: CPT | Mod: 26,S$PBB,, | Performed by: INTERNAL MEDICINE

## 2021-08-12 PROCEDURE — 93451 PR RIGHT HEART CATH O2 SATURATION & CARDIAC OUTPUT: ICD-10-PCS | Mod: 26,,, | Performed by: INTERNAL MEDICINE

## 2021-08-12 PROCEDURE — 25000003 PHARM REV CODE 250: Performed by: INTERNAL MEDICINE

## 2021-08-12 PROCEDURE — 93451 RIGHT HEART CATH: CPT | Mod: 26,,, | Performed by: INTERNAL MEDICINE

## 2021-08-12 PROCEDURE — 85730 THROMBOPLASTIN TIME PARTIAL: CPT | Performed by: INTERNAL MEDICINE

## 2021-08-12 PROCEDURE — 80053 COMPREHEN METABOLIC PANEL: CPT | Performed by: INTERNAL MEDICINE

## 2021-08-12 PROCEDURE — 83735 ASSAY OF MAGNESIUM: CPT | Performed by: INTERNAL MEDICINE

## 2021-08-12 PROCEDURE — 94618 PULMONARY STRESS TESTING: ICD-10-PCS | Mod: 26,S$PBB,, | Performed by: INTERNAL MEDICINE

## 2021-08-12 PROCEDURE — 85610 PROTHROMBIN TIME: CPT | Performed by: INTERNAL MEDICINE

## 2021-08-12 RX ORDER — LIDOCAINE HCL/EPINEPHRINE/PF 2%-1:200K
VIAL (ML) INJECTION
Status: DISCONTINUED | OUTPATIENT
Start: 2021-08-12 | End: 2021-08-12 | Stop reason: HOSPADM

## 2021-09-03 ENCOUNTER — PATIENT MESSAGE (OUTPATIENT)
Dept: PHARMACY | Facility: CLINIC | Age: 51
End: 2021-09-03

## 2021-09-09 ENCOUNTER — ANTI-COAG VISIT (OUTPATIENT)
Dept: CARDIOLOGY | Facility: CLINIC | Age: 51
End: 2021-09-09
Payer: MEDICARE

## 2021-09-09 DIAGNOSIS — I48.91 ATRIAL FIBRILLATION, UNSPECIFIED TYPE: ICD-10-CM

## 2021-09-09 DIAGNOSIS — Z79.01 LONG TERM (CURRENT) USE OF ANTICOAGULANTS: Primary | ICD-10-CM

## 2021-09-09 LAB — INR PPP: 2.5 (ref 2–3)

## 2021-09-09 PROCEDURE — 93793 PR ANTICOAGULANT MGMT FOR PT TAKING WARFARIN: ICD-10-PCS | Mod: ,,,

## 2021-09-09 PROCEDURE — 85610 PROTHROMBIN TIME: CPT | Mod: PBBFAC

## 2021-09-09 PROCEDURE — 93793 ANTICOAG MGMT PT WARFARIN: CPT | Mod: ,,,

## 2021-09-24 ENCOUNTER — TELEPHONE (OUTPATIENT)
Dept: TRANSPLANT | Facility: CLINIC | Age: 51
End: 2021-09-24

## 2021-09-30 RX ORDER — VIT B COMP NO.3/FOLIC/C/BIOTIN 1 MG-60 MG
1 TABLET ORAL DAILY
Qty: 90 TABLET | Refills: 2 | Status: SHIPPED | OUTPATIENT
Start: 2021-09-30 | End: 2022-10-20 | Stop reason: SDUPTHER

## 2021-10-01 ENCOUNTER — COMMITTEE REVIEW (OUTPATIENT)
Dept: TRANSPLANT | Facility: CLINIC | Age: 51
End: 2021-10-01

## 2021-10-05 ENCOUNTER — TELEPHONE (OUTPATIENT)
Dept: TRANSPLANT | Facility: CLINIC | Age: 51
End: 2021-10-05

## 2021-10-07 ENCOUNTER — ANTI-COAG VISIT (OUTPATIENT)
Dept: CARDIOLOGY | Facility: CLINIC | Age: 51
End: 2021-10-07
Payer: MEDICARE

## 2021-10-07 DIAGNOSIS — Z79.01 LONG TERM (CURRENT) USE OF ANTICOAGULANTS: Primary | ICD-10-CM

## 2021-10-07 DIAGNOSIS — I48.91 ATRIAL FIBRILLATION, UNSPECIFIED TYPE: ICD-10-CM

## 2021-10-07 LAB — INR PPP: 1.7 (ref 2–3)

## 2021-10-07 PROCEDURE — 93793 PR ANTICOAGULANT MGMT FOR PT TAKING WARFARIN: ICD-10-PCS | Mod: ,,,

## 2021-10-07 PROCEDURE — 93793 ANTICOAG MGMT PT WARFARIN: CPT | Mod: ,,,

## 2021-10-07 PROCEDURE — 85610 PROTHROMBIN TIME: CPT | Mod: PBBFAC

## 2021-10-14 ENCOUNTER — OFFICE VISIT (OUTPATIENT)
Dept: CARDIOLOGY | Facility: CLINIC | Age: 51
End: 2021-10-14
Payer: MEDICARE

## 2021-10-14 VITALS
SYSTOLIC BLOOD PRESSURE: 106 MMHG | RESPIRATION RATE: 16 BRPM | WEIGHT: 227.31 LBS | HEIGHT: 74 IN | HEART RATE: 103 BPM | OXYGEN SATURATION: 97 % | BODY MASS INDEX: 29.17 KG/M2 | DIASTOLIC BLOOD PRESSURE: 72 MMHG

## 2021-10-14 DIAGNOSIS — I48.91 ATRIAL FIBRILLATION, UNSPECIFIED TYPE: ICD-10-CM

## 2021-10-14 DIAGNOSIS — I27.20 PULMONARY HTN: ICD-10-CM

## 2021-10-14 DIAGNOSIS — I47.29 NSVT (NONSUSTAINED VENTRICULAR TACHYCARDIA): ICD-10-CM

## 2021-10-14 DIAGNOSIS — Z01.810 PREOP CARDIOVASCULAR EXAM: Primary | ICD-10-CM

## 2021-10-14 DIAGNOSIS — I49.3 PVC'S (PREMATURE VENTRICULAR CONTRACTIONS): ICD-10-CM

## 2021-10-14 DIAGNOSIS — Q24.9 ADULT CONGENITAL HEART DISEASE: ICD-10-CM

## 2021-10-14 DIAGNOSIS — I10 ESSENTIAL HYPERTENSION: ICD-10-CM

## 2021-10-14 DIAGNOSIS — I11.0 HYPERTENSIVE CARDIOMEGALY WITH HEART FAILURE: ICD-10-CM

## 2021-10-14 DIAGNOSIS — Z95.2 HISTORY OF MECHANICAL AORTIC VALVE REPLACEMENT: ICD-10-CM

## 2021-10-14 DIAGNOSIS — I50.32 CHRONIC DIASTOLIC HEART FAILURE: ICD-10-CM

## 2021-10-14 DIAGNOSIS — D63.1 ANEMIA ASSOCIATED WITH CHRONIC RENAL FAILURE: ICD-10-CM

## 2021-10-14 DIAGNOSIS — Z99.2 ESRD (END STAGE RENAL DISEASE) ON DIALYSIS: ICD-10-CM

## 2021-10-14 DIAGNOSIS — I27.9 CHRONIC PULMONARY HEART DISEASE: ICD-10-CM

## 2021-10-14 DIAGNOSIS — I47.19 ATRIAL TACHYCARDIA: ICD-10-CM

## 2021-10-14 DIAGNOSIS — N18.6 ESRD (END STAGE RENAL DISEASE) ON DIALYSIS: ICD-10-CM

## 2021-10-14 DIAGNOSIS — Z98.890 HISTORY OF RADIOFREQUENCY ABLATION FOR COMPLEX RIGHT ATRIAL ARRHYTHMIA: ICD-10-CM

## 2021-10-14 DIAGNOSIS — N18.9 ANEMIA ASSOCIATED WITH CHRONIC RENAL FAILURE: ICD-10-CM

## 2021-10-14 PROCEDURE — 99214 PR OFFICE/OUTPT VISIT, EST, LEVL IV, 30-39 MIN: ICD-10-PCS | Mod: S$PBB,TXP,, | Performed by: INTERNAL MEDICINE

## 2021-10-14 PROCEDURE — 99999 PR PBB SHADOW E&M-EST. PATIENT-LVL V: CPT | Mod: PBBFAC,TXP,, | Performed by: INTERNAL MEDICINE

## 2021-10-14 PROCEDURE — 99214 OFFICE O/P EST MOD 30 MIN: CPT | Mod: S$PBB,TXP,, | Performed by: INTERNAL MEDICINE

## 2021-10-14 PROCEDURE — 99215 OFFICE O/P EST HI 40 MIN: CPT | Mod: PBBFAC,TXP | Performed by: INTERNAL MEDICINE

## 2021-10-14 PROCEDURE — 99999 PR PBB SHADOW E&M-EST. PATIENT-LVL V: ICD-10-PCS | Mod: PBBFAC,TXP,, | Performed by: INTERNAL MEDICINE

## 2021-10-14 RX ORDER — DILTIAZEM HYDROCHLORIDE 360 MG/1
360 CAPSULE, EXTENDED RELEASE ORAL DAILY
Qty: 90 CAPSULE | Refills: 1 | Status: SHIPPED | OUTPATIENT
Start: 2021-10-14 | End: 2022-05-30 | Stop reason: SDUPTHER

## 2021-11-04 ENCOUNTER — ANTI-COAG VISIT (OUTPATIENT)
Dept: CARDIOLOGY | Facility: CLINIC | Age: 51
End: 2021-11-04
Payer: MEDICARE

## 2021-11-04 DIAGNOSIS — I48.91 ATRIAL FIBRILLATION, UNSPECIFIED TYPE: ICD-10-CM

## 2021-11-04 DIAGNOSIS — Z79.01 LONG TERM (CURRENT) USE OF ANTICOAGULANTS: Primary | ICD-10-CM

## 2021-11-04 LAB — INR PPP: 6.7 (ref 2–3)

## 2021-11-04 PROCEDURE — 93793 PR ANTICOAGULANT MGMT FOR PT TAKING WARFARIN: ICD-10-PCS | Mod: ,,,

## 2021-11-04 PROCEDURE — 85610 PROTHROMBIN TIME: CPT | Mod: PBBFAC

## 2021-11-04 PROCEDURE — 93793 ANTICOAG MGMT PT WARFARIN: CPT | Mod: ,,,

## 2021-11-06 RX ORDER — LABETALOL 200 MG/1
200 TABLET, FILM COATED ORAL 3 TIMES DAILY
Qty: 90 TABLET | Refills: 3 | Status: SHIPPED | OUTPATIENT
Start: 2021-11-06 | End: 2022-11-30 | Stop reason: SDUPTHER

## 2021-11-08 ENCOUNTER — ANTI-COAG VISIT (OUTPATIENT)
Dept: CARDIOLOGY | Facility: CLINIC | Age: 51
End: 2021-11-08
Payer: MEDICARE

## 2021-11-08 DIAGNOSIS — Z79.01 LONG TERM (CURRENT) USE OF ANTICOAGULANTS: Primary | ICD-10-CM

## 2021-11-08 DIAGNOSIS — Z95.2 HISTORY OF MECHANICAL AORTIC VALVE REPLACEMENT: ICD-10-CM

## 2021-11-08 LAB — INR PPP: 1.6 (ref 2–3)

## 2021-11-08 PROCEDURE — 85610 PROTHROMBIN TIME: CPT | Mod: PBBFAC

## 2021-11-08 PROCEDURE — 93793 PR ANTICOAGULANT MGMT FOR PT TAKING WARFARIN: ICD-10-PCS | Mod: ,,,

## 2021-11-08 PROCEDURE — 93793 ANTICOAG MGMT PT WARFARIN: CPT | Mod: ,,,

## 2021-11-16 ENCOUNTER — TELEPHONE (OUTPATIENT)
Dept: TRANSPLANT | Facility: CLINIC | Age: 51
End: 2021-11-16
Payer: MEDICARE

## 2021-11-16 ENCOUNTER — OFFICE VISIT (OUTPATIENT)
Dept: TRANSPLANT | Facility: CLINIC | Age: 51
End: 2021-11-16
Payer: MEDICARE

## 2021-11-16 VITALS
HEART RATE: 104 BPM | DIASTOLIC BLOOD PRESSURE: 79 MMHG | OXYGEN SATURATION: 93 % | BODY MASS INDEX: 28.52 KG/M2 | HEIGHT: 74 IN | SYSTOLIC BLOOD PRESSURE: 138 MMHG | TEMPERATURE: 97 F | WEIGHT: 222.25 LBS

## 2021-11-16 DIAGNOSIS — I27.20 PULMONARY HTN: Primary | ICD-10-CM

## 2021-11-16 DIAGNOSIS — N18.6 ESRD (END STAGE RENAL DISEASE) ON DIALYSIS: Chronic | ICD-10-CM

## 2021-11-16 DIAGNOSIS — I48.91 ATRIAL FIBRILLATION, UNSPECIFIED TYPE: ICD-10-CM

## 2021-11-16 DIAGNOSIS — N25.81 SECONDARY HYPERPARATHYROIDISM OF RENAL ORIGIN: ICD-10-CM

## 2021-11-16 DIAGNOSIS — Z98.890 HISTORY OF RADIOFREQUENCY ABLATION FOR COMPLEX RIGHT ATRIAL ARRHYTHMIA: ICD-10-CM

## 2021-11-16 DIAGNOSIS — Z99.2 ESRD (END STAGE RENAL DISEASE) ON DIALYSIS: Chronic | ICD-10-CM

## 2021-11-16 DIAGNOSIS — I10 ESSENTIAL HYPERTENSION: ICD-10-CM

## 2021-11-16 PROBLEM — B18.1 CHRONIC VIRAL HEPATITIS B WITHOUT DELTA AGENT AND WITHOUT COMA: Status: RESOLVED | Noted: 2019-02-13 | Resolved: 2021-11-16

## 2021-11-16 PROCEDURE — 99215 PR OFFICE/OUTPT VISIT, EST, LEVL V, 40-54 MIN: ICD-10-PCS | Mod: S$PBB,TXP,, | Performed by: INTERNAL MEDICINE

## 2021-11-16 PROCEDURE — 99999 PR PBB SHADOW E&M-EST. PATIENT-LVL IV: ICD-10-PCS | Mod: PBBFAC,TXP,, | Performed by: INTERNAL MEDICINE

## 2021-11-16 PROCEDURE — 99215 OFFICE O/P EST HI 40 MIN: CPT | Mod: S$PBB,TXP,, | Performed by: INTERNAL MEDICINE

## 2021-11-16 PROCEDURE — 99214 OFFICE O/P EST MOD 30 MIN: CPT | Mod: PBBFAC,NTX | Performed by: INTERNAL MEDICINE

## 2021-11-16 PROCEDURE — 99999 PR PBB SHADOW E&M-EST. PATIENT-LVL IV: CPT | Mod: PBBFAC,TXP,, | Performed by: INTERNAL MEDICINE

## 2021-11-18 ENCOUNTER — ANTI-COAG VISIT (OUTPATIENT)
Dept: CARDIOLOGY | Facility: CLINIC | Age: 51
End: 2021-11-18
Payer: MEDICARE

## 2021-11-18 DIAGNOSIS — Q24.9 ADULT CONGENITAL HEART DISEASE: ICD-10-CM

## 2021-11-18 DIAGNOSIS — I42.8 NONISCHEMIC CARDIOMYOPATHY: ICD-10-CM

## 2021-11-18 DIAGNOSIS — N18.6 ESRD (END STAGE RENAL DISEASE): ICD-10-CM

## 2021-11-18 DIAGNOSIS — I50.42 CHRONIC COMBINED SYSTOLIC AND DIASTOLIC HEART FAILURE: ICD-10-CM

## 2021-11-18 DIAGNOSIS — I10 UNCONTROLLED HYPERTENSION: ICD-10-CM

## 2021-11-18 DIAGNOSIS — I47.19 ATRIAL TACHYCARDIA: ICD-10-CM

## 2021-11-18 DIAGNOSIS — I48.91 ATRIAL FIBRILLATION, UNSPECIFIED TYPE: ICD-10-CM

## 2021-11-18 DIAGNOSIS — Z95.2 HISTORY OF MECHANICAL AORTIC VALVE REPLACEMENT: ICD-10-CM

## 2021-11-18 DIAGNOSIS — Z98.890 HISTORY OF RADIOFREQUENCY ABLATION FOR COMPLEX RIGHT ATRIAL ARRHYTHMIA: ICD-10-CM

## 2021-11-18 DIAGNOSIS — Z95.2 S/P AVR (AORTIC VALVE REPLACEMENT): ICD-10-CM

## 2021-11-18 DIAGNOSIS — Z79.01 LONG TERM (CURRENT) USE OF ANTICOAGULANTS: Primary | ICD-10-CM

## 2021-11-18 DIAGNOSIS — I35.0 NONRHEUMATIC AORTIC VALVE STENOSIS: ICD-10-CM

## 2021-11-18 LAB — INR PPP: 1.9 (ref 2–3)

## 2021-11-18 PROCEDURE — 93793 ANTICOAG MGMT PT WARFARIN: CPT | Mod: ,,,

## 2021-11-18 PROCEDURE — 93793 PR ANTICOAGULANT MGMT FOR PT TAKING WARFARIN: ICD-10-PCS | Mod: ,,,

## 2021-11-18 PROCEDURE — 85610 PROTHROMBIN TIME: CPT | Mod: PBBFAC

## 2021-11-18 RX ORDER — AMLODIPINE BESYLATE 10 MG/1
10 TABLET ORAL DAILY
Qty: 30 TABLET | Refills: 3 | Status: SHIPPED | OUTPATIENT
Start: 2021-11-18 | End: 2022-04-12 | Stop reason: SDUPTHER

## 2021-11-18 RX ORDER — HYDROXYZINE PAMOATE 25 MG/1
25 CAPSULE ORAL DAILY
Qty: 30 CAPSULE | Refills: 3 | Status: SHIPPED | OUTPATIENT
Start: 2021-11-18 | End: 2023-10-04 | Stop reason: SDUPTHER

## 2021-12-02 ENCOUNTER — IMMUNIZATION (OUTPATIENT)
Dept: PRIMARY CARE CLINIC | Facility: CLINIC | Age: 51
End: 2021-12-02
Payer: MEDICARE

## 2021-12-02 ENCOUNTER — ANTI-COAG VISIT (OUTPATIENT)
Dept: CARDIOLOGY | Facility: CLINIC | Age: 51
End: 2021-12-02
Payer: MEDICARE

## 2021-12-02 DIAGNOSIS — Z23 NEED FOR VACCINATION: Primary | ICD-10-CM

## 2021-12-02 DIAGNOSIS — Z79.01 LONG TERM (CURRENT) USE OF ANTICOAGULANTS: Primary | ICD-10-CM

## 2021-12-02 DIAGNOSIS — I48.91 ATRIAL FIBRILLATION, UNSPECIFIED TYPE: ICD-10-CM

## 2021-12-02 LAB — INR PPP: 2.6 (ref 2–3)

## 2021-12-02 PROCEDURE — 0064A COVID-19, MRNA, LNP-S, PF, 100 MCG/0.25 ML DOSE VACCINE (MODERNA BOOSTER): CPT | Mod: CV19,PBBFAC,NTX | Performed by: FAMILY MEDICINE

## 2021-12-02 PROCEDURE — 93793 PR ANTICOAGULANT MGMT FOR PT TAKING WARFARIN: ICD-10-PCS | Mod: ,,,

## 2021-12-02 PROCEDURE — 93793 ANTICOAG MGMT PT WARFARIN: CPT | Mod: ,,,

## 2021-12-02 PROCEDURE — 85610 PROTHROMBIN TIME: CPT | Mod: PBBFAC

## 2021-12-07 ENCOUNTER — OFFICE VISIT (OUTPATIENT)
Dept: HEPATOLOGY | Facility: CLINIC | Age: 51
End: 2021-12-07
Payer: MEDICARE

## 2021-12-07 VITALS
WEIGHT: 218.94 LBS | HEIGHT: 74 IN | DIASTOLIC BLOOD PRESSURE: 80 MMHG | RESPIRATION RATE: 12 BRPM | HEART RATE: 88 BPM | BODY MASS INDEX: 28.1 KG/M2 | SYSTOLIC BLOOD PRESSURE: 124 MMHG

## 2021-12-07 DIAGNOSIS — B18.1 CHRONIC VIRAL HEPATITIS B WITHOUT DELTA AGENT AND WITHOUT COMA: ICD-10-CM

## 2021-12-07 DIAGNOSIS — K74.60 HEPATIC CIRRHOSIS, UNSPECIFIED HEPATIC CIRRHOSIS TYPE, UNSPECIFIED WHETHER ASCITES PRESENT: ICD-10-CM

## 2021-12-07 DIAGNOSIS — N18.6 ESRD (END STAGE RENAL DISEASE) ON DIALYSIS: ICD-10-CM

## 2021-12-07 DIAGNOSIS — Z79.01 CURRENT USE OF LONG TERM ANTICOAGULATION: ICD-10-CM

## 2021-12-07 DIAGNOSIS — Z99.2 ESRD (END STAGE RENAL DISEASE) ON DIALYSIS: ICD-10-CM

## 2021-12-07 DIAGNOSIS — D50.0 IRON DEFICIENCY ANEMIA DUE TO CHRONIC BLOOD LOSS: Primary | ICD-10-CM

## 2021-12-07 PROCEDURE — 99999 PR PBB SHADOW E&M-EST. PATIENT-LVL IV: ICD-10-PCS | Mod: PBBFAC,TXP,, | Performed by: NURSE PRACTITIONER

## 2021-12-07 PROCEDURE — 99214 OFFICE O/P EST MOD 30 MIN: CPT | Mod: S$PBB,TXP,, | Performed by: NURSE PRACTITIONER

## 2021-12-07 PROCEDURE — 99999 PR PBB SHADOW E&M-EST. PATIENT-LVL IV: CPT | Mod: PBBFAC,TXP,, | Performed by: NURSE PRACTITIONER

## 2021-12-07 PROCEDURE — 99214 PR OFFICE/OUTPT VISIT, EST, LEVL IV, 30-39 MIN: ICD-10-PCS | Mod: S$PBB,TXP,, | Performed by: NURSE PRACTITIONER

## 2021-12-07 PROCEDURE — 99214 OFFICE O/P EST MOD 30 MIN: CPT | Mod: PBBFAC,TXP | Performed by: NURSE PRACTITIONER

## 2021-12-13 ENCOUNTER — TELEPHONE (OUTPATIENT)
Dept: HEPATOLOGY | Facility: CLINIC | Age: 51
End: 2021-12-13
Payer: MEDICARE

## 2021-12-14 ENCOUNTER — SOCIAL WORK (OUTPATIENT)
Dept: TRANSPLANT | Facility: CLINIC | Age: 51
End: 2021-12-14
Payer: MEDICARE

## 2021-12-14 ENCOUNTER — TELEPHONE (OUTPATIENT)
Dept: HEPATOLOGY | Facility: CLINIC | Age: 51
End: 2021-12-14
Payer: MEDICARE

## 2021-12-16 ENCOUNTER — DOCUMENTATION ONLY (OUTPATIENT)
Dept: GASTROENTEROLOGY | Facility: CLINIC | Age: 51
End: 2021-12-16
Payer: MEDICARE

## 2021-12-16 ENCOUNTER — ANTI-COAG VISIT (OUTPATIENT)
Dept: CARDIOLOGY | Facility: CLINIC | Age: 51
End: 2021-12-16
Payer: MEDICARE

## 2021-12-16 DIAGNOSIS — Z79.01 LONG TERM (CURRENT) USE OF ANTICOAGULANTS: Primary | ICD-10-CM

## 2021-12-16 DIAGNOSIS — I48.91 ATRIAL FIBRILLATION, UNSPECIFIED TYPE: ICD-10-CM

## 2021-12-16 LAB — INR PPP: 1.4 (ref 2–3)

## 2021-12-16 PROCEDURE — 93793 PR ANTICOAGULANT MGMT FOR PT TAKING WARFARIN: ICD-10-PCS | Mod: ,,,

## 2021-12-16 PROCEDURE — 93793 ANTICOAG MGMT PT WARFARIN: CPT | Mod: ,,,

## 2021-12-16 PROCEDURE — 85610 PROTHROMBIN TIME: CPT | Mod: PBBFAC

## 2021-12-21 ENCOUNTER — TELEPHONE (OUTPATIENT)
Dept: TRANSPLANT | Facility: CLINIC | Age: 51
End: 2021-12-21
Payer: MEDICARE

## 2021-12-24 RX ORDER — LISINOPRIL 40 MG/1
40 TABLET ORAL DAILY
Qty: 30 TABLET | Refills: 6 | Status: SHIPPED | OUTPATIENT
Start: 2021-12-24 | End: 2022-11-29 | Stop reason: SDUPTHER

## 2021-12-27 DIAGNOSIS — I47.19 ATRIAL TACHYCARDIA: ICD-10-CM

## 2021-12-27 DIAGNOSIS — I50.32 CHRONIC DIASTOLIC HEART FAILURE: ICD-10-CM

## 2021-12-27 DIAGNOSIS — I48.91 ATRIAL FIBRILLATION, UNSPECIFIED TYPE: Primary | ICD-10-CM

## 2021-12-27 DIAGNOSIS — I49.3 PVC'S (PREMATURE VENTRICULAR CONTRACTIONS): ICD-10-CM

## 2021-12-28 ENCOUNTER — OFFICE VISIT (OUTPATIENT)
Dept: CARDIOLOGY | Facility: CLINIC | Age: 51
End: 2021-12-28
Payer: MEDICARE

## 2021-12-28 ENCOUNTER — TELEPHONE (OUTPATIENT)
Dept: TRANSPLANT | Facility: CLINIC | Age: 51
End: 2021-12-28
Payer: MEDICARE

## 2021-12-28 ENCOUNTER — HOSPITAL ENCOUNTER (OUTPATIENT)
Dept: CARDIOLOGY | Facility: HOSPITAL | Age: 51
Discharge: HOME | End: 2021-12-28
Attending: INTERNAL MEDICINE
Payer: MEDICARE

## 2021-12-28 ENCOUNTER — ANTI-COAG VISIT (OUTPATIENT)
Dept: CARDIOLOGY | Facility: CLINIC | Age: 51
End: 2021-12-28
Payer: MEDICARE

## 2021-12-28 VITALS
HEIGHT: 74 IN | WEIGHT: 219.38 LBS | DIASTOLIC BLOOD PRESSURE: 80 MMHG | HEART RATE: 98 BPM | BODY MASS INDEX: 28.15 KG/M2 | SYSTOLIC BLOOD PRESSURE: 132 MMHG | OXYGEN SATURATION: 96 %

## 2021-12-28 DIAGNOSIS — Z79.01 LONG TERM (CURRENT) USE OF ANTICOAGULANTS: Primary | ICD-10-CM

## 2021-12-28 DIAGNOSIS — Z98.890 HISTORY OF RADIOFREQUENCY ABLATION FOR COMPLEX RIGHT ATRIAL ARRHYTHMIA: ICD-10-CM

## 2021-12-28 DIAGNOSIS — Z95.2 S/P AVR (AORTIC VALVE REPLACEMENT): ICD-10-CM

## 2021-12-28 DIAGNOSIS — Z99.2 ESRD (END STAGE RENAL DISEASE) ON DIALYSIS: ICD-10-CM

## 2021-12-28 DIAGNOSIS — I47.29 NSVT (NONSUSTAINED VENTRICULAR TACHYCARDIA): ICD-10-CM

## 2021-12-28 DIAGNOSIS — I50.32 CHRONIC DIASTOLIC HEART FAILURE: ICD-10-CM

## 2021-12-28 DIAGNOSIS — I11.0 HYPERTENSIVE CARDIOMEGALY WITH HEART FAILURE: ICD-10-CM

## 2021-12-28 DIAGNOSIS — G47.33 OSA (OBSTRUCTIVE SLEEP APNEA): ICD-10-CM

## 2021-12-28 DIAGNOSIS — I49.3 PVC'S (PREMATURE VENTRICULAR CONTRACTIONS): ICD-10-CM

## 2021-12-28 DIAGNOSIS — I10 ESSENTIAL HYPERTENSION: ICD-10-CM

## 2021-12-28 DIAGNOSIS — Z79.01 LONG TERM (CURRENT) USE OF ANTICOAGULANTS: ICD-10-CM

## 2021-12-28 DIAGNOSIS — N18.6 ESRD (END STAGE RENAL DISEASE) ON DIALYSIS: ICD-10-CM

## 2021-12-28 DIAGNOSIS — Q24.9 ADULT CONGENITAL HEART DISEASE: ICD-10-CM

## 2021-12-28 DIAGNOSIS — I50.32 CHRONIC DIASTOLIC HEART FAILURE: Primary | ICD-10-CM

## 2021-12-28 DIAGNOSIS — I47.19 ATRIAL TACHYCARDIA: ICD-10-CM

## 2021-12-28 DIAGNOSIS — I48.91 ATRIAL FIBRILLATION, UNSPECIFIED TYPE: ICD-10-CM

## 2021-12-28 DIAGNOSIS — Z95.2 HISTORY OF MECHANICAL AORTIC VALVE REPLACEMENT: ICD-10-CM

## 2021-12-28 LAB — INR PPP: 3.7 (ref 2–3)

## 2021-12-28 PROCEDURE — 85610 PROTHROMBIN TIME: CPT | Mod: PBBFAC

## 2021-12-28 PROCEDURE — 99999 PR PBB SHADOW E&M-EST. PATIENT-LVL IV: CPT | Mod: PBBFAC,TXP,, | Performed by: INTERNAL MEDICINE

## 2021-12-28 PROCEDURE — 99215 PR OFFICE/OUTPT VISIT, EST, LEVL V, 40-54 MIN: ICD-10-PCS | Mod: 25,S$PBB,NTX, | Performed by: INTERNAL MEDICINE

## 2021-12-28 PROCEDURE — 93010 ELECTROCARDIOGRAM REPORT: CPT | Mod: TXP,,, | Performed by: INTERNAL MEDICINE

## 2021-12-28 PROCEDURE — 99214 OFFICE O/P EST MOD 30 MIN: CPT | Mod: PBBFAC,NTX | Performed by: INTERNAL MEDICINE

## 2021-12-28 PROCEDURE — 93010 EKG 12-LEAD: ICD-10-PCS | Mod: TXP,,, | Performed by: INTERNAL MEDICINE

## 2021-12-28 PROCEDURE — 99215 OFFICE O/P EST HI 40 MIN: CPT | Mod: 25,S$PBB,NTX, | Performed by: INTERNAL MEDICINE

## 2021-12-28 PROCEDURE — 99999 PR PBB SHADOW E&M-EST. PATIENT-LVL IV: ICD-10-PCS | Mod: PBBFAC,TXP,, | Performed by: INTERNAL MEDICINE

## 2021-12-28 PROCEDURE — 93005 ELECTROCARDIOGRAM TRACING: CPT | Mod: NTX

## 2021-12-29 RX ORDER — ENOXAPARIN SODIUM 150 MG/ML
1 INJECTION SUBCUTANEOUS DAILY
Qty: 4.9 ML | Refills: 0
Start: 2021-12-29 | End: 2022-01-05

## 2021-12-30 ENCOUNTER — TELEPHONE (OUTPATIENT)
Dept: TRANSPLANT | Facility: CLINIC | Age: 51
End: 2021-12-30
Payer: MEDICARE

## 2021-12-30 DIAGNOSIS — Z76.82 ORGAN TRANSPLANT CANDIDATE: Primary | ICD-10-CM

## 2022-01-04 DIAGNOSIS — Z79.01 LONG TERM (CURRENT) USE OF ANTICOAGULANTS: ICD-10-CM

## 2022-01-05 RX ORDER — WARFARIN 7.5 MG/1
TABLET ORAL
Qty: 45 TABLET | Refills: 6 | Status: SHIPPED | OUTPATIENT
Start: 2022-01-05 | End: 2022-12-13 | Stop reason: SDUPTHER

## 2022-01-07 ENCOUNTER — ANTI-COAG VISIT (OUTPATIENT)
Dept: CARDIOLOGY | Facility: CLINIC | Age: 52
End: 2022-01-07
Payer: MEDICARE

## 2022-01-07 DIAGNOSIS — Z79.01 LONG TERM (CURRENT) USE OF ANTICOAGULANTS: Primary | ICD-10-CM

## 2022-01-07 DIAGNOSIS — I48.91 ATRIAL FIBRILLATION, UNSPECIFIED TYPE: ICD-10-CM

## 2022-01-07 LAB — INR PPP: 2 (ref 2–3)

## 2022-01-07 PROCEDURE — 93793 ANTICOAG MGMT PT WARFARIN: CPT | Mod: ,,,

## 2022-01-07 PROCEDURE — 93793 PR ANTICOAGULANT MGMT FOR PT TAKING WARFARIN: ICD-10-PCS | Mod: ,,,

## 2022-01-07 PROCEDURE — 85610 PROTHROMBIN TIME: CPT | Mod: PBBFAC

## 2022-01-07 NOTE — PROGRESS NOTES
INR at goal. Medications and chart reviewed. No changes noted to necessitate adjustment of warfarin or follow-up plan. See calendar.  Lovenox dosing per Dr Gudino for upcoming EGD.  Recommend Heparin & admission.  Patient will instead use 1 mg/kg (105mg)q 24 per Dr Gudino as bridge while off of warfarin.  We will provide procedure dosing prior to test which is now 2/8/22.

## 2022-01-07 NOTE — PROGRESS NOTES
Patient's INR is therapeutic at 2.0.  Patient reports followed previous instructions.  Reports rescheduled EGD from 1/11/22 to 2/8/22.  Reports no other changes.  Instructions given: continue warfarin 7.5 mg every day.  Recheck on 2/3/22.  Calendar reviewed with patient.  Patient verbalizes understanding.

## 2022-01-13 ENCOUNTER — OFFICE VISIT (OUTPATIENT)
Dept: INTERNAL MEDICINE | Facility: CLINIC | Age: 52
End: 2022-01-13
Payer: MEDICARE

## 2022-01-13 VITALS
SYSTOLIC BLOOD PRESSURE: 112 MMHG | RESPIRATION RATE: 18 BRPM | TEMPERATURE: 98 F | BODY MASS INDEX: 27.77 KG/M2 | WEIGHT: 216.25 LBS | HEART RATE: 78 BPM | OXYGEN SATURATION: 97 % | DIASTOLIC BLOOD PRESSURE: 62 MMHG

## 2022-01-13 DIAGNOSIS — L02.419 AXILLARY ABSCESS: Primary | ICD-10-CM

## 2022-01-13 PROCEDURE — 99999 PR PBB SHADOW E&M-EST. PATIENT-LVL V: CPT | Mod: PBBFAC,,, | Performed by: NURSE PRACTITIONER

## 2022-01-13 PROCEDURE — 99999 PR PBB SHADOW E&M-EST. PATIENT-LVL V: ICD-10-PCS | Mod: PBBFAC,,, | Performed by: NURSE PRACTITIONER

## 2022-01-13 PROCEDURE — 99213 PR OFFICE/OUTPT VISIT, EST, LEVL III, 20-29 MIN: ICD-10-PCS | Mod: S$PBB,,, | Performed by: NURSE PRACTITIONER

## 2022-01-13 PROCEDURE — 99215 OFFICE O/P EST HI 40 MIN: CPT | Mod: PBBFAC | Performed by: NURSE PRACTITIONER

## 2022-01-13 PROCEDURE — 99213 OFFICE O/P EST LOW 20 MIN: CPT | Mod: S$PBB,,, | Performed by: NURSE PRACTITIONER

## 2022-01-13 RX ORDER — CEPHALEXIN 500 MG/1
500 CAPSULE ORAL DAILY
Qty: 10 CAPSULE | Refills: 0 | Status: SHIPPED | OUTPATIENT
Start: 2022-01-13 | End: 2022-02-03

## 2022-01-13 RX ORDER — WARFARIN 1 MG/1
TABLET ORAL
COMMUNITY
End: 2023-12-12 | Stop reason: SDUPTHER

## 2022-01-13 RX ORDER — HYDROCHLOROTHIAZIDE 12.5 MG/1
CAPSULE ORAL
COMMUNITY
End: 2024-01-25

## 2022-01-13 NOTE — PATIENT INSTRUCTIONS
Patient Education       Boil Discharge Instructions   About this topic   A boil is an infection in the skin where hair starts to grow. In order for the boil to heal, it needs to open so the pus inside can drain out. Sometimes, the doctor will make an opening in the boil. Other times, you will treat a boil at home. Do not squeeze or try to pop your boil as this may spread the infection.  What care is needed at home?   · Ask your doctor what you need to do when you go home. Make sure you ask questions if you do not understand what the doctor says.  · Apply warm compresses to the area of the boil for 15 to 30 minutes at least 4 times each day.  · After the boil has opened, wash the area with soap and water 1 to 2 times each day until it is healed. Cover the area with a clean bandage.  · Wash your hands before and after you touch your boil or dressing.  · If you were given an antibiotic, be sure to take all of it as ordered.  What follow-up care is needed?   Your doctor may ask you to make visits to the office to check on your progress. Be sure to keep these visits.  What drugs may be needed?   The doctor may order drugs to:  · Help with pain and swelling  · Fight an infection  Will physical activity be limited?   Your physical activities may be limited because of the pain. You can manage it by taking drugs to help with pain.  What problems could happen?   · Spread of infection to other skin parts  · Blood infection  · Scars  What can be done to prevent this health problem?   · Wash your hands before and after touching the infected area.  · Take a bath or shower every day.  · Do not share towels, washcloths, sheets, or other things with someone who has a boil.  When do I need to call the doctor?   · The boil becomes much larger.  · You notice one or more red lines moving from the boil area up your arm or leg towards your body.  · You have a fever of 100.4°F (38°C) or higher.  · Your boil starts to cause very bad  pain.  · Your boil gets better, but then comes back.  Teach Back: Helping You Understand   The Teach Back Method helps you understand the information we are giving you. After you talk with the staff, tell them in your own words what you learned. This helps to make sure the staff has described each thing clearly. It also helps to explain things that may have been confusing. Before going home, make sure you can do these:  · I can tell you about my condition.  · I can tell you how to care for my boil.  · I can tell you what I will do if I have a fever, chills, or the boil comes back.  Where can I learn more?   [employer]  https://www.Person[employer].org.nz/how-treat-when-seek-help-boils   [facility] Choices  https://www.nhs.uk/conditions/boils/   Last Reviewed Date   2021-06-10  Consumer Information Use and Disclaimer   This information is not specific medical advice and does not replace information you receive from your health care provider. This is only a brief summary of general information. It does NOT include all information about conditions, illnesses, injuries, tests, procedures, treatments, therapies, discharge instructions or life-style choices that may apply to you. You must talk with your health care provider for complete information about your health and treatment options. This information should not be used to decide whether or not to accept your health care providers advice, instructions or recommendations. Only your health care provider has the knowledge and training to provide advice that is right for you.  Copyright   Copyright © 2021 [employer] and its affiliates and/or licensors. All rights reserved.

## 2022-01-14 NOTE — PROGRESS NOTES
Subjective:       Patient ID: Isidro White Jr. is a 52 y.o. male.    Chief Complaint: No chief complaint on file.    HPI    Pt with painful knot under L arm x several days  Draining. No fever, myalgias or other issues      Past Medical History:   Diagnosis Date    Anemia     Aortic valve stenosis     s/p mechanical AVR    Atrial fibrillation     Atrial flutter     Cardiomyopathy     CHF (congestive heart failure)     Drug-induced erectile dysfunction     ESRD due to hypertension     HD M, W, F    ESRD on dialysis     GERD (gastroesophageal reflux disease)     Hepatitis B     Hyperlipidemia     Hypertension     Nightmares     Obesity     DANIEL (obstructive sleep apnea) 11/12/2019    Secondary hyperparathyroidism of renal origin     Supraventricular tachycardia     atrial tachycardia    Tachycardia     Valvular regurgitation     AI, TR     Past Surgical History:   Procedure Laterality Date    ASD REPAIR      age 8 [facility]    AV Graft Creation Left 03/2017    [facility]ATION      [facility]     CARDIAC VALVE SURGERY  02/08/2017    22 mm Medtronic AV, 28 mm TV Medtronic annuloplaty ring    COLONOSCOPY N/A 8/27/2019    Procedure: COLONOSCOPY;  Surgeon: Addie John MD;  Location: [facility] ENDO;  Service: Endoscopy;  Laterality: N/A;  dialysis pt *needs K*    COLONOSCOPY N/A 9/3/2020    Procedure: COLONOSCOPY-need INR;  Surgeon: Jemma Youngblood MD;  Location: [facility] ENDO;  Service: Endoscopy;  Laterality: N/A;    ESOPHAGOGASTRODUODENOSCOPY N/A 8/27/2019    Procedure: EGD (ESOPHAGOGASTRODUODENOSCOPY);  Surgeon: Addie John MD;  Location: [facility];  Service: Endoscopy;  Laterality: N/A;    RADIOFREQUENCY ABLATION  03/13/2017    Atrial tachycardia    REMOVAL OF GRAFT Left 7/2/2020    Procedure: REMOVAL, GRAFT;  Surgeon: Sascha Sidhu MD;  Location: [facility] OR;  Service: Peripheral Vascular;  Laterality: Left;    RIGHT HEART CATHETERIZATION Right 8/12/2021    Procedure:  INSERTION, CATHETER, RIGHT HEART;  Surgeon: Mariah Pierce MD;  Location: [facility] CATH LAB;  Service: Cardiology;  Laterality: Right;     Social History     Socioeconomic History    Marital status: Single   Tobacco Use    Smoking status: Never Smoker    Smokeless tobacco: Never Used   Substance and Sexual Activity    Alcohol use: No     Comment: quit in 2016; does have heavy drinking history; started drinking at age 13; was drinking a 12 pack over the weekend and two 24 ounces of beer a day during the week along with a pint of hard alcohol    Drug use: No    Sexual activity: Not Currently   Social History Narrative    Caregiver Nurse Marjan Swain     Review of patient's allergies indicates:  No Known Allergies  Current Outpatient Medications   Medication Sig    amLODIPine (NORVASC) 10 MG tablet take 1 tablet by mouth every day    ascorbic acid, vitamin C, (VITAMIN C) 500 MG tablet Take 1 tablet (500 mg total) by mouth 2 (two) times daily.    b complex vitamins tablet Take 1 tablet by mouth once daily.    diltiaZEM (CARDIZEM CD) 360 MG 24 hr capsule Take 1 capsule (360 mg total) by mouth once daily.    epoetin vikki (PROCRIT) 10,000 unit/mL injection Inject 1 mL (10,000 Units total) into the skin every Mon, Wed, Fri. To be given with HD    ferrous sulfate 324 mg (65 mg iron) TbEC Take 325 mg by mouth once daily.    fish oil-omega-3 fatty acids 300-1,000 mg capsule Take 2 g by mouth once daily.    hydrALAZINE (APRESOLINE) 100 MG tablet TAKE ONE TABLET BY MOUTH THREE TIMES DAILY INCLUDING DIALYSIS DAYS    hydrOXYzine pamoate (VISTARIL) 25 MG Cap take 1 capsule by mouth daily as needed for itching    labetaloL (NORMODYNE) 200 MG tablet Take 1 tablet (200 mg total) by mouth 3 (three) times daily.    labetaloL (NORMODYNE) 200 MG tablet Take 1 tablet by mouth three times daily    labetaloL (NORMODYNE) 200 MG tablet Take 1 tablet (200 mg total) by mouth 3 (three) times daily.    lisinopriL  (PRINIVIL,ZESTRIL) 40 MG tablet Take 1 tablet (40 mg total) by mouth once daily.    multivitamin with minerals tablet Take 1 tablet by mouth once daily.    pantoprazole (PROTONIX) 40 MG tablet TAKE ONE TABLET BY MOUTH EVERY DAY    ETHAN-RACQUEL RX 1- mg-mg-mcg Tab TAKE ONE TABLET BY MOUTH EVERY DAY    tenofovir disoproxil fumarate (VIREAD) 300 mg Tab TAKE 1 TABLET BY MOUTH ONCE WEEKLY    triamcinolone acetonide 0.1% (KENALOG) 0.1 % ointment Apply topically 2 (two) times daily.    vit b cmplx 3-fa-vit c-biotin 1- mg-mg-mcg (ETHAN-RACQUEL RX) 1- mg-mg-mcg Tab Take 1 tablet by mouth once daily.    vitamin D (VITAMIN D3) 1000 units Tab Take 1 tablet by mouth daily    warfarin (COUMADIN) 7.5 MG tablet Take 2 tablets by mouth on Tuesday, Thursdays, and Saturday; and 1 tablet on all other days of the week or as directed by coumadin clinic. (Patient taking differently: Take 1 tablet daily or as directed by coumadin clinic.)    albuterol (VENTOLIN HFA) 90 mcg/actuation inhaler Inhale 2 puffs into the lungs every 6 (six) hours as needed for Wheezing or Shortness of Breath. Rescue    calcium acetate,phosphat bind, (PHOSLO) 667 mg capsule Take 2 capsules (1,334 mg total) by mouth 3 (three) times daily.    cephALEXin (KEFLEX) 500 MG capsule Take 1 capsule (500 mg total) by mouth once daily.    hydroCHLOROthiazide (MICROZIDE) 12.5 mg capsule     omega-3 fatty acids-vitamin E (FISH OIL) 1,000 mg Cap Take 1 capsule by mouth once daily.    warfarin (COUMADIN) 1 MG tablet      No current facility-administered medications for this visit.     Facility-Administered Medications Ordered in Other Visits   Medication    0.9%  NaCl infusion    sodium chloride 0.9% flush 10 mL           Review of Systems   Constitutional: Negative for activity change, appetite change, chills, diaphoresis, fatigue, fever and unexpected weight change.   HENT: Negative for congestion, ear pain, postnasal drip, rhinorrhea, sinus  pressure, sinus pain, sneezing, sore throat, tinnitus, trouble swallowing and voice change.    Eyes: Negative for photophobia, pain and visual disturbance.   Respiratory: Negative for cough, chest tightness, shortness of breath and wheezing.    Cardiovascular: Negative for chest pain, palpitations and leg swelling.   Gastrointestinal: Negative for abdominal distention, abdominal pain, constipation, diarrhea, nausea and vomiting.   Genitourinary: Negative for decreased urine volume, difficulty urinating, dysuria, flank pain, frequency, hematuria and urgency.   Musculoskeletal: Negative for arthralgias, back pain, joint swelling, neck pain and neck stiffness.   Skin:        See HPI   Allergic/Immunologic: Negative for immunocompromised state.   Neurological: Negative for dizziness, tremors, seizures, syncope, facial asymmetry, speech difficulty, weakness, light-headedness, numbness and headaches.   Hematological: Negative for adenopathy. Does not bruise/bleed easily.   Psychiatric/Behavioral: Negative for confusion and sleep disturbance.       Objective:      Physical Exam  Skin:     Comments: Axillary furuncle-tender-drainage serosang fluid. scant         Assessment:     Vitals:    01/13/22 1633   BP: 112/62   Pulse: 78   Resp: 18   Temp: 98.3 °F (36.8 °C)         1. Axillary abscess        Plan:   Axillary abscess  -     cephALEXin (KEFLEX) 500 MG capsule; Take 1 capsule (500 mg total) by mouth once daily.  Dispense: 10 capsule; Refill: 0      As above  Warm compresses  avs given and discusse

## 2022-01-26 ENCOUNTER — TELEPHONE (OUTPATIENT)
Dept: RADIOLOGY | Facility: HOSPITAL | Age: 52
End: 2022-01-26
Payer: MEDICARE

## 2022-01-27 ENCOUNTER — OFFICE VISIT (OUTPATIENT)
Dept: HEPATOLOGY | Facility: CLINIC | Age: 52
End: 2022-01-27
Payer: MEDICARE

## 2022-01-27 ENCOUNTER — ANTI-COAG VISIT (OUTPATIENT)
Dept: CARDIOLOGY | Facility: CLINIC | Age: 52
End: 2022-01-27
Payer: MEDICARE

## 2022-01-27 ENCOUNTER — HOSPITAL ENCOUNTER (OUTPATIENT)
Dept: RADIOLOGY | Facility: HOSPITAL | Age: 52
Discharge: HOME | End: 2022-01-27
Attending: NURSE PRACTITIONER
Payer: MEDICARE

## 2022-01-27 VITALS
BODY MASS INDEX: 27.87 KG/M2 | HEIGHT: 74 IN | HEART RATE: 80 BPM | SYSTOLIC BLOOD PRESSURE: 142 MMHG | WEIGHT: 217.13 LBS | DIASTOLIC BLOOD PRESSURE: 80 MMHG

## 2022-01-27 DIAGNOSIS — B18.1 CHRONIC HEPATITIS B WITH CIRRHOSIS: ICD-10-CM

## 2022-01-27 DIAGNOSIS — K74.60 HEPATIC CIRRHOSIS, UNSPECIFIED HEPATIC CIRRHOSIS TYPE, UNSPECIFIED WHETHER ASCITES PRESENT: ICD-10-CM

## 2022-01-27 DIAGNOSIS — K74.60 HEPATIC CIRRHOSIS, UNSPECIFIED HEPATIC CIRRHOSIS TYPE, UNSPECIFIED WHETHER ASCITES PRESENT: Primary | ICD-10-CM

## 2022-01-27 DIAGNOSIS — K74.60 CHRONIC HEPATITIS B WITH CIRRHOSIS: ICD-10-CM

## 2022-01-27 DIAGNOSIS — B18.1 CHRONIC VIRAL HEPATITIS B WITHOUT DELTA AGENT AND WITHOUT COMA: ICD-10-CM

## 2022-01-27 DIAGNOSIS — N18.6 ESRD (END STAGE RENAL DISEASE) ON DIALYSIS: ICD-10-CM

## 2022-01-27 DIAGNOSIS — Z99.2 ESRD (END STAGE RENAL DISEASE) ON DIALYSIS: ICD-10-CM

## 2022-01-27 DIAGNOSIS — Z79.01 ANTICOAGULATED: ICD-10-CM

## 2022-01-27 PROCEDURE — 93793 ANTICOAG MGMT PT WARFARIN: CPT | Mod: ,,,

## 2022-01-27 PROCEDURE — 93793 PR ANTICOAGULANT MGMT FOR PT TAKING WARFARIN: ICD-10-PCS | Mod: ,,,

## 2022-01-27 PROCEDURE — 99214 OFFICE O/P EST MOD 30 MIN: CPT | Mod: S$PBB,NTX,, | Performed by: NURSE PRACTITIONER

## 2022-01-27 PROCEDURE — 99214 PR OFFICE/OUTPT VISIT, EST, LEVL IV, 30-39 MIN: ICD-10-PCS | Mod: S$PBB,NTX,, | Performed by: NURSE PRACTITIONER

## 2022-01-27 PROCEDURE — 76705 US ABDOMEN LIMITED: ICD-10-PCS | Mod: 26,NTX,, | Performed by: RADIOLOGY

## 2022-01-27 PROCEDURE — 99999 PR PBB SHADOW E&M-EST. PATIENT-LVL III: CPT | Mod: PBBFAC,TXP,, | Performed by: NURSE PRACTITIONER

## 2022-01-27 PROCEDURE — 76705 ECHO EXAM OF ABDOMEN: CPT | Mod: TC,NTX

## 2022-01-27 PROCEDURE — 76705 ECHO EXAM OF ABDOMEN: CPT | Mod: 26,NTX,, | Performed by: RADIOLOGY

## 2022-01-27 PROCEDURE — 99999 PR PBB SHADOW E&M-EST. PATIENT-LVL III: ICD-10-PCS | Mod: PBBFAC,TXP,, | Performed by: NURSE PRACTITIONER

## 2022-01-27 PROCEDURE — 99213 OFFICE O/P EST LOW 20 MIN: CPT | Mod: PBBFAC,25,TXP | Performed by: NURSE PRACTITIONER

## 2022-01-27 NOTE — PROGRESS NOTES
Clinic Follow Up:  [facility] Follow Up Note    Reason for Follow Up:  The primary encounter diagnosis was Hepatic cirrhosis, unspecified hepatic cirrhosis type, unspecified whether ascites present. Diagnoses of Chronic hepatitis B with cirrhosis and ESRD (end stage renal disease) on dialysis were also pertinent to this visit.    PCP: Fuad Gudino Md       HPI:  This is a 52 y.o. male here for follow up of the above  Pt states that he has been feeling overall stable without any decompensating events  Labs today are pending  US stable without lesion or mass  No upper GI bleeding.  No ascites or BLE.  No overt confusion.  Pt requesting to cancel EGD previously ordered for concern of AGNES.  Pt states that per his dialysis team, anemia is stable with adjustment in medication.   Pt having no evidence of GI bleeding.       Review of Systems   Constitutional: Negative for chills, fever, malaise/fatigue and weight loss.   Respiratory: Negative for cough.    Cardiovascular: Negative for chest pain.   Gastrointestinal:        Per HPI   Musculoskeletal: Negative for myalgias.   Skin: Negative for itching and rash.   Neurological: Negative for headaches.   Psychiatric/Behavioral: The patient is not nervous/anxious.        Medical History:  Past Medical History:   Diagnosis Date    Anemia     Aortic valve stenosis     s/p mechanical AVR    Atrial fibrillation     Atrial flutter     Cardiomyopathy     CHF (congestive heart failure)     Drug-induced erectile dysfunction     ESRD due to hypertension     HD M, W, F    ESRD on dialysis     GERD (gastroesophageal reflux disease)     Hepatitis B     Hyperlipidemia     Hypertension     Nightmares     Obesity     DANIEL (obstructive sleep apnea) 11/12/2019    Secondary hyperparathyroidism of renal origin     Supraventricular tachycardia     atrial tachycardia    Tachycardia     Valvular regurgitation     AI, TR       Surgical History:   Past Surgical  History:   Procedure Laterality Date    ASD REPAIR      age 8 [facility]    AV Graft Creation Left 03/2017    C[facility]ATION      [facility]     CARDIAC VALVE SURGERY  02/08/2017    22 mm Medtronic AV, 28 mm TV Medtronic annuloplaty ring    COLONOSCOPY N/A 8/27/2019    Procedure: COLONOSCOPY;  Surgeon: Addie John MD;  Location: [facility] ENDO;  Service: Endoscopy;  Laterality: N/A;  dialysis pt *needs K*    COLONOSCOPY N/A 9/3/2020    Procedure: COLONOSCOPY-need INR;  Surgeon: Jemma Youngblood MD;  Location: [facility] ENDO;  Service: Endoscopy;  Laterality: N/A;    ESOPHAGOGASTRODUODENOSCOPY N/A 8/27/2019    Procedure: EGD (ESOPHAGOGASTRODUODENOSCOPY);  Surgeon: Addie John MD;  [facility];  Service: Endoscopy;  Laterality: N/A;    RADIOFREQUENCY ABLATION  03/13/2017    Atrial tachycardia    REMOVAL OF GRAFT Left 7/2/2020    Procedure: REMOVAL, GRAFT;  Surgeon: Sascha Sidhu MD;  Location: [facility] OR;  Service: Peripheral Vascular;  Laterality: Left;    RIGHT HEART CATHETERIZATION Right 8/12/2021    Procedure: INSERTION, CATHETER, RIGHT HEART;  Surgeon: Mariah Pierce MD;  [facility] CATH LAB;  Service: Cardiology;  Laterality: Right;       Family History:   Family History   Problem Relation Age of Onset    Leukemia Mother     Cancer Mother     Heart attack Father         massive MI at age 67    No Known Problems Sister     No Known Problems Brother     No Known Problems Sister     No Known Problems Sister     Heart failure Paternal Grandmother     Diabetes Paternal Aunt     Hypertension Paternal Aunt     Leukemia Paternal Aunt         CLL    Suicide Paternal Uncle     Anesthesia problems Paternal Uncle     Diabetes Paternal Aunt     Stroke Paternal Aunt     Valvular heart disease Maternal Aunt     Kidney disease Neg Hx     Colon cancer Neg Hx        Social History:   Social History     Tobacco Use    Smoking status: Never Smoker    Smokeless tobacco: Never  Used   Substance Use Topics    Alcohol use: No     Comment: quit in 2016; does have heavy drinking history; started drinking at age 13; was drinking a 12 pack over the weekend and two 24 ounces of beer a day during the week along with a pint of hard alcohol    Drug use: No       Allergies: Reviewed    Home Medications:  Current Outpatient Medications on File Prior to Visit   Medication Sig Dispense Refill    albuterol (VENTOLIN HFA) 90 mcg/actuation inhaler Inhale 2 puffs into the lungs every 6 (six) hours as needed for Wheezing or Shortness of Breath. Rescue 18 g 3    amLODIPine (NORVASC) 10 MG tablet take 1 tablet by mouth every day 30 tablet 3    ascorbic acid, vitamin C, (VITAMIN C) 500 MG tablet Take 1 tablet (500 mg total) by mouth 2 (two) times daily.      b complex vitamins tablet Take 1 tablet by mouth once daily.      calcium acetate,phosphat bind, (PHOSLO) 667 mg capsule Take 2 capsules (1,334 mg total) by mouth 3 (three) times daily. 180 capsule 3    cephALEXin (KEFLEX) 500 MG capsule Take 1 capsule (500 mg total) by mouth once daily. 10 capsule 0    diltiaZEM (CARDIZEM CD) 360 MG 24 hr capsule Take 1 capsule (360 mg total) by mouth once daily. 90 capsule 1    epoetin vikki (PROCRIT) 10,000 unit/mL injection Inject 1 mL (10,000 Units total) into the skin every Mon, Wed, Fri. To be given with HD      ferrous sulfate 324 mg (65 mg iron) TbEC Take 325 mg by mouth once daily.      fish oil-omega-3 fatty acids 300-1,000 mg capsule Take 2 g by mouth once daily.      hydrALAZINE (APRESOLINE) 100 MG tablet TAKE ONE TABLET BY MOUTH THREE TIMES DAILY INCLUDING DIALYSIS DAYS 90 tablet 5    hydroCHLOROthiazide (MICROZIDE) 12.5 mg capsule       hydrOXYzine pamoate (VISTARIL) 25 MG Cap take 1 capsule by mouth daily as needed for itching 30 capsule 3    labetaloL (NORMODYNE) 200 MG tablet Take 1 tablet (200 mg total) by mouth 3 (three) times daily. 90 tablet 3    labetaloL (NORMODYNE) 200 MG tablet Take  "1 tablet by mouth three times daily 90 tablet 6    labetaloL (NORMODYNE) 200 MG tablet Take 1 tablet (200 mg total) by mouth 3 (three) times daily. 90 tablet 6    lisinopriL (PRINIVIL,ZESTRIL) 40 MG tablet Take 1 tablet (40 mg total) by mouth once daily. 30 tablet 6    multivitamin with minerals tablet Take 1 tablet by mouth once daily.      omega-3 fatty acids-vitamin E (FISH OIL) 1,000 mg Cap Take 1 capsule by mouth once daily.  0    pantoprazole (PROTONIX) 40 MG tablet TAKE ONE TABLET BY MOUTH EVERY DAY 30 tablet 8    ETHAN-RACQUEL RX 1- mg-mg-mcg Tab TAKE ONE TABLET BY MOUTH EVERY DAY 90 tablet 1    tenofovir disoproxil fumarate (VIREAD) 300 mg Tab TAKE 1 TABLET BY MOUTH ONCE WEEKLY 30 tablet 2    triamcinolone acetonide 0.1% (KENALOG) 0.1 % ointment Apply topically 2 (two) times daily. 80 g 0    vit b cmplx 3-fa-vit c-biotin 1- mg-mg-mcg (ETHAN-RACQUEL RX) 1- mg-mg-mcg Tab Take 1 tablet by mouth once daily. 90 tablet 2    vitamin D (VITAMIN D3) 1000 units Tab Take 1 tablet by mouth daily 30 tablet 6    warfarin (COUMADIN) 1 MG tablet       warfarin (COUMADIN) 7.5 MG tablet Take 2 tablets by mouth on Tuesday, Thursdays, and Saturday; and 1 tablet on all other days of the week or as directed by coumadin clinic. (Patient taking differently: Take 1 tablet daily or as directed by coumadin clinic.) 45 tablet 6     Current Facility-Administered Medications on File Prior to Visit   Medication Dose Route Frequency Provider Last Rate Last Admin    0.9%  NaCl infusion   Intravenous Continuous Christopher Jaen MD        sodium chloride 0.9% flush 10 mL  10 mL Intravenous PRN Christopher Jane MD           Physical Exam:  Vital Signs:  BP (!) 142/80   Pulse 80   Ht 6' 2" (1.88 m)   Wt 98.5 kg (217 lb 2.5 oz)   BMI 27.88 kg/m²   Body mass index is 27.88 kg/m².  Physical Exam  Vitals reviewed.   Constitutional:       Appearance: He is well-developed.   HENT:      Head: Normocephalic and atraumatic. "   Eyes:      General: No scleral icterus.  Cardiovascular:      Rate and Rhythm: Normal rate.   Pulmonary:      Effort: Pulmonary effort is normal.   Abdominal:      General: There is no distension.      Tenderness: There is no abdominal tenderness.   Musculoskeletal:         General: Normal range of motion.      Cervical back: Normal range of motion.   Skin:     General: Skin is warm.   Neurological:      Mental Status: He is alert and oriented to person, place, and time.   Psychiatric:         Mood and Affect: Mood and affect normal.         Labs: Pertinent labs reviewed.  MELD-Na score: 27 at 8/12/2021 11:31 AM  MELD score: 27 at 8/12/2021 11:31 AM  Calculated from:  Serum Creatinine: On dialysis. Using 4 mg/dL.  Serum Sodium: 138 mmol/L (Using max of 137 mmol/L) at 8/12/2021 11:31 AM  Total Bilirubin: 0.5 mg/dL (Using min of 1 mg/dL) at 8/12/2021 11:31 AM  INR(ratio): 2.0 at 8/12/2021 11:31 AM  Age: 51 years  EV: EGD 2019 without EV noted. Pt high risk for EGD complications due to anticoagulation needs. Pt requests cancellation of procedure.   HCC: US 1/22 without lesion or mass       Assessment:  1. Hepatic cirrhosis, unspecified hepatic cirrhosis type, unspecified whether ascites present    2. Chronic hepatitis B with cirrhosis    3. ESRD (end stage renal disease) on dialysis        Recommendations:  Hepatic cirrhosis, unspecified hepatic cirrhosis type, unspecified whether ascites present  Chronic hepatitis B with cirrhosis  - stable without any evidence of decompensation  - continue with MELD labs and HCC screening every 6 months  - would be due for EGD for EV screening in August.  Can re-evaluate at next appt.   - continue current medications.   -     CBC Auto Differential; Future; Expected date: 01/27/2022  -     AFP Tumor Marker; Future; Expected date: 01/27/2022  -     Comprehensive Metabolic Panel; Future; Expected date: 01/27/2022  -     Protime-INR; Future; Expected date: 01/27/2022  -     HEPATITIS B  VIRAL DNA, QUANTITATIVE; Future; Expected date: 01/27/2022  -     US Abdomen Limited; Future; Expected date: 01/27/2022    ESRD (end stage renal disease) on dialysis  - No complaints per pt  - continue POC per dialysis team           Return to Clinic:  6 months with pre-clinic labs and imaging.

## 2022-01-27 NOTE — PROGRESS NOTES
INR not at goal. Medications, chart, and patient findings reviewed. See calendar for adjustments to dose and follow up plan.  INR preformed today in clinic - 1.5- subtherapeutic. Pt reports one missed dose this week.   Boost one dose today then resume previous dose of warfarin.  Patient will keep his CC appointment on 2/3/22.

## 2022-02-03 ENCOUNTER — OFFICE VISIT (OUTPATIENT)
Dept: INTERNAL MEDICINE | Facility: CLINIC | Age: 52
End: 2022-02-03
Payer: MEDICARE

## 2022-02-03 ENCOUNTER — TELEPHONE (OUTPATIENT)
Dept: CARDIOLOGY | Facility: CLINIC | Age: 52
End: 2022-02-03
Payer: MEDICARE

## 2022-02-03 VITALS
TEMPERATURE: 99 F | RESPIRATION RATE: 16 BRPM | BODY MASS INDEX: 27.16 KG/M2 | OXYGEN SATURATION: 97 % | HEIGHT: 74 IN | DIASTOLIC BLOOD PRESSURE: 68 MMHG | HEART RATE: 95 BPM | SYSTOLIC BLOOD PRESSURE: 108 MMHG | WEIGHT: 211.63 LBS

## 2022-02-03 DIAGNOSIS — L02.429: Primary | ICD-10-CM

## 2022-02-03 PROCEDURE — 99999 PR PBB SHADOW E&M-EST. PATIENT-LVL V: ICD-10-PCS | Mod: PBBFAC,,, | Performed by: NURSE PRACTITIONER

## 2022-02-03 PROCEDURE — 99215 OFFICE O/P EST HI 40 MIN: CPT | Mod: PBBFAC | Performed by: NURSE PRACTITIONER

## 2022-02-03 PROCEDURE — 99999 PR PBB SHADOW E&M-EST. PATIENT-LVL V: CPT | Mod: PBBFAC,,, | Performed by: NURSE PRACTITIONER

## 2022-02-03 PROCEDURE — 99213 PR OFFICE/OUTPT VISIT, EST, LEVL III, 20-29 MIN: ICD-10-PCS | Mod: S$PBB,,, | Performed by: NURSE PRACTITIONER

## 2022-02-03 PROCEDURE — 99213 OFFICE O/P EST LOW 20 MIN: CPT | Mod: S$PBB,,, | Performed by: NURSE PRACTITIONER

## 2022-02-03 RX ORDER — AMOXICILLIN AND CLAVULANATE POTASSIUM 500; 125 MG/1; MG/1
1 TABLET, FILM COATED ORAL DAILY
Qty: 14 TABLET | Refills: 0 | Status: SHIPPED | OUTPATIENT
Start: 2022-02-03 | End: 2023-12-12

## 2022-02-07 ENCOUNTER — EPISODE CHANGES (OUTPATIENT)
Dept: TRANSPLANT | Facility: CLINIC | Age: 52
End: 2022-02-07

## 2022-02-07 ENCOUNTER — TELEPHONE (OUTPATIENT)
Dept: TRANSPLANT | Facility: CLINIC | Age: 52
End: 2022-02-07
Payer: MEDICARE

## 2022-02-15 ENCOUNTER — ANTI-COAG VISIT (OUTPATIENT)
Dept: CARDIOLOGY | Facility: CLINIC | Age: 52
End: 2022-02-15
Payer: MEDICARE

## 2022-02-15 ENCOUNTER — HOSPITAL ENCOUNTER (OUTPATIENT)
Dept: RADIOLOGY | Facility: HOSPITAL | Age: 52
Discharge: HOME | End: 2022-02-15
Attending: NURSE PRACTITIONER
Payer: MEDICARE

## 2022-02-15 ENCOUNTER — HOSPITAL ENCOUNTER (OUTPATIENT)
Dept: CARDIOLOGY | Facility: HOSPITAL | Age: 52
Discharge: HOME | End: 2022-02-15
Attending: NURSE PRACTITIONER
Payer: MEDICARE

## 2022-02-15 VITALS — WEIGHT: 211 LBS | HEIGHT: 74 IN | BODY MASS INDEX: 27.08 KG/M2

## 2022-02-15 DIAGNOSIS — Z79.01 LONG TERM (CURRENT) USE OF ANTICOAGULANTS: Primary | ICD-10-CM

## 2022-02-15 DIAGNOSIS — Z76.82 ORGAN TRANSPLANT CANDIDATE: ICD-10-CM

## 2022-02-15 DIAGNOSIS — I48.91 ATRIAL FIBRILLATION, UNSPECIFIED TYPE: ICD-10-CM

## 2022-02-15 LAB
AV INDEX (PROSTH): 0.48
AV MEAN GRADIENT: 14 MMHG
AV PEAK GRADIENT: 27 MMHG
AV VALVE AREA: 1.64 CM2
AV VELOCITY RATIO: 0.46
BSA FOR ECHO PROCEDURE: 2.24 M2
CV ECHO LV RWT: 0.66 CM
DOP CALC AO PEAK VEL: 2.61 M/S
DOP CALC AO VTI: 56.9 CM
DOP CALC LVOT AREA: 3.4 CM2
DOP CALC LVOT DIAMETER: 2.09 CM
DOP CALC LVOT PEAK VEL: 1.19 M/S
DOP CALC LVOT STROKE VOLUME: 93.27 CM3
DOP CALC RVOT PEAK VEL: 0.6 M/S
DOP CALC RVOT VTI: 13.8 CM
DOP CALCLVOT PEAK VEL VTI: 27.2 CM
E WAVE DECELERATION TIME: 265.72 MSEC
E/A RATIO: 1.33
ECHO EF ESTIMATED: 65 %
ECHO LV POSTERIOR WALL: 1.61 CM (ref 0.6–1.1)
EJECTION FRACTION: 55 %
FRACTIONAL SHORTENING: 36 % (ref 28–44)
INR PPP: 1.7 (ref 2–3)
INTERVENTRICULAR SEPTUM: 1.61 CM (ref 0.6–1.1)
IVRT: 74.22 MSEC
LA MAJOR: 6.55 CM
LA WIDTH: 4.13 CM
LEFT ATRIUM SIZE: 5.2 CM
LEFT ATRIUM VOLUME INDEX MOD: 36.6 ML/M2
LEFT ATRIUM VOLUME MOD: 81.29 CM3
LEFT INTERNAL DIMENSION IN SYSTOLE: 3.13 CM (ref 2.1–4)
LEFT VENTRICLE DIASTOLIC VOLUME INDEX: 50.5 ML/M2
LEFT VENTRICLE DIASTOLIC VOLUME: 112.11 ML
LEFT VENTRICLE MASS INDEX: 156 G/M2
LEFT VENTRICLE SYSTOLIC VOLUME INDEX: 17.5 ML/M2
LEFT VENTRICLE SYSTOLIC VOLUME: 38.88 ML
LEFT VENTRICULAR INTERNAL DIMENSION IN DIASTOLE: 4.89 CM (ref 3.5–6)
LEFT VENTRICULAR MASS: 347.11 G
LVOT MG: 3.32 MMHG
LVOT MV: 0.84 CM/S
MV PEAK A VEL: 1.29 M/S
MV PEAK E VEL: 1.71 M/S
MV STENOSIS PRESSURE HALF TIME: 77.06 MS
MV VALVE AREA P 1/2 METHOD: 2.85 CM2
PISA TR MAX VEL: 3.72 M/S
PV MEAN GRADIENT: 0.73 MMHG
PV PEAK VELOCITY: 1.42 CM/S
RA MAJOR: 5.91 CM
RA PRESSURE: 3 MMHG
RA WIDTH: 3.98 CM
RIGHT VENTRICULAR END-DIASTOLIC DIMENSION: 4.61 CM
SINUS: 3.18 CM
STJ: 2.71 CM
TR MAX PG: 55 MMHG
TRICUSPID ANNULAR PLANE SYSTOLIC EXCURSION: 0.9 CM
TV REST PULMONARY ARTERY PRESSURE: 58 MMHG

## 2022-02-15 PROCEDURE — 93306 ECHO (CUPID ONLY): ICD-10-PCS | Mod: 26,TXP,, | Performed by: STUDENT IN AN ORGANIZED HEALTH CARE EDUCATION/TRAINING PROGRAM

## 2022-02-15 PROCEDURE — 76770 US RETROPERITONEAL COMPLETE: ICD-10-PCS | Mod: 26,TXP,, | Performed by: RADIOLOGY

## 2022-02-15 PROCEDURE — 76770 US EXAM ABDO BACK WALL COMP: CPT | Mod: TC,TXP

## 2022-02-15 PROCEDURE — 93793 PR ANTICOAGULANT MGMT FOR PT TAKING WARFARIN: ICD-10-PCS | Mod: S$PBB,,,

## 2022-02-15 PROCEDURE — 93306 TTE W/DOPPLER COMPLETE: CPT | Mod: 26,TXP,, | Performed by: STUDENT IN AN ORGANIZED HEALTH CARE EDUCATION/TRAINING PROGRAM

## 2022-02-15 PROCEDURE — 76770 US EXAM ABDO BACK WALL COMP: CPT | Mod: 26,TXP,, | Performed by: RADIOLOGY

## 2022-02-15 PROCEDURE — 93306 TTE W/DOPPLER COMPLETE: CPT | Mod: TXP

## 2022-02-15 PROCEDURE — 93793 ANTICOAG MGMT PT WARFARIN: CPT | Mod: S$PBB,,,

## 2022-02-15 PROCEDURE — 85610 PROTHROMBIN TIME: CPT | Mod: PBBFAC,NTX

## 2022-02-15 NOTE — PROGRESS NOTES
Patient's INR is sub-therapeutic at 1.7.  Patient's EGD previously scheduled on 2/8/22 has been canceled.  Patient reports missed dose on 2/13/22.  Advised of increased risk of clotting with missed dose; signs/symptoms of clotting and need to go to ED if experiences any.  Reports no signs/symptoms of clotting.  Will give boosted dose of warfarin 11.25 mg today 2/15/22; then resume warfarin 7.5 mg every day.  Recheck on 2/24/22.  Calendar reviewed with patient.  Patient verbalizes understanding.

## 2022-02-16 DIAGNOSIS — Z76.82 ORGAN TRANSPLANT CANDIDATE: Primary | ICD-10-CM

## 2022-02-24 ENCOUNTER — DOCUMENTATION ONLY (OUTPATIENT)
Dept: NEPHROLOGY | Facility: CLINIC | Age: 52
End: 2022-02-24
Payer: MEDICARE

## 2022-02-24 ENCOUNTER — ANTI-COAG VISIT (OUTPATIENT)
Dept: CARDIOLOGY | Facility: CLINIC | Age: 52
End: 2022-02-24
Payer: MEDICARE

## 2022-02-24 DIAGNOSIS — Z79.01 LONG TERM (CURRENT) USE OF ANTICOAGULANTS: Primary | ICD-10-CM

## 2022-02-24 DIAGNOSIS — I48.91 ATRIAL FIBRILLATION, UNSPECIFIED TYPE: ICD-10-CM

## 2022-02-24 LAB — INR PPP: 2.5 (ref 2–3)

## 2022-02-24 PROCEDURE — 93793 ANTICOAG MGMT PT WARFARIN: CPT | Mod: S$PBB,,,

## 2022-02-24 PROCEDURE — 85610 PROTHROMBIN TIME: CPT | Mod: PBBFAC

## 2022-02-24 PROCEDURE — 93793 PR ANTICOAGULANT MGMT FOR PT TAKING WARFARIN: ICD-10-PCS | Mod: S$PBB,,,

## 2022-02-24 NOTE — PROGRESS NOTES
Patient's INR is therapeutic at 2.5.  Reports no changes.  Instructions given: Continue warfarin 7.5 mg every day.  Recheck in two weeks on 3/10/22.  Calendar reviewed with patient.  Patient verbalizes understanding.

## 2022-02-24 NOTE — PROGRESS NOTES
History & Physical      Chief Complaint:  H&P    HPI:        Patient is an  male with ESRD on HD MWF at the [facility].           ROS:        Constitutional: Negative for fever, chills, weight loss, malaise/fatigue and diaphoresis.   HENT: Negative for hearing loss, ear pain, nosebleeds, congestion, sore throat, neck pain, tinnitus and ear discharge.    Eyes: Negative for blurred vision, double vision, photophobia, pain, discharge and redness.   Respiratory: Negative for cough, hemoptysis, sputum production, shortness of breath, wheezing and stridor.    Cardiovascular: Negative for chest pain, palpitations, orthopnea, claudication, leg swelling and PND.   Gastrointestinal: Negative for heartburn, nausea, vomiting, abdominal pain, diarrhea, constipation, blood in stool and melena.   Genitourinary: Negative for dysuria, urgency, frequency, hematuria and flank pain.   Musculoskeletal: Negative for myalgias, back pain, joint pain and falls.   Skin: Negative for itching and rash.   Neurological: Negative for dizziness, tingling, tremors, sensory change, speech change, focal weakness, seizures, loss of consciousness, weakness and headaches.   Endo/Heme/Allergies: Negative for environmental allergies and polydipsia. Does not bruise/bleed easily.   Psychiatric/Behavioral: Negative for depression, suicidal ideas, hallucinations, memory loss and substance abuse. The patient is not nervous/anxious and does not have insomnia.    All 14 systems reviewed and negative except as noted above.  Balance of review of systems is negative.           Past Medical History:   Diagnosis Date    Anemia     Aortic valve stenosis     s/p mechanical AVR    Atrial fibrillation     Atrial flutter     Cardiomyopathy     CHF (congestive heart failure)     Drug-induced erectile dysfunction     ESRD due to hypertension     HD M, W, F    ESRD on dialysis     GERD (gastroesophageal  reflux disease)     Hepatitis B     Hyperlipidemia     Hypertension     Nightmares     Obesity     DANIEL (obstructive sleep apnea) 11/12/2019    Secondary hyperparathyroidism of renal origin     Supraventricular tachycardia     atrial tachycardia    Tachycardia     Valvular regurgitation     AI, TR       Past Surgical History:   Procedure Laterality Date    ASD REPAIR      age 8 [facility]    AV Graft Creation Left 03/2017    [facility]ATION      [facility]     CARDIAC VALVE SURGERY  02/08/2017    22 mm Medtronic AV, 28 mm TV Medtronic annuloplaty ring    COLONOSCOPY N/A 8/27/2019    Procedure: COLONOSCOPY;  Surgeon: Addie John MD;  Location: [facility] ENDO;  Service: Endoscopy;  Laterality: N/A;  dialysis pt *needs K*    COLONOSCOPY N/A 9/3/2020    Procedure: COLONOSCOPY-need INR;  Surgeon: Jemma Youngblood MD;  Location: [facility] ENDO;  Service: Endoscopy;  Laterality: N/A;    ESOPHAGOGASTRODUODENOSCOPY N/A 8/27/2019    Procedure: EGD (ESOPHAGOGASTRODUODENOSCOPY);  Surgeon: Addie John MD;  Location: [facility];  Service: Endoscopy;  Laterality: N/A;    RADIOFREQUENCY ABLATION  03/13/2017    Atrial tachycardia    REMOVAL OF GRAFT Left 7/2/2020    Procedure: REMOVAL, GRAFT;  Surgeon: Sascha Sidhu MD;  Location: [facility] OR;  Service: Peripheral Vascular;  Laterality: Left;    RIGHT HEART CATHETERIZATION Right 8/12/2021    Procedure: INSERTION, CATHETER, RIGHT HEART;  Surgeon: Mariha Pierce MD;  Location: [facility] CATH LAB;  Service: Cardiology;  Laterality: Right;       Family History   Problem Relation Age of Onset    Leukemia Mother     Cancer Mother     Heart attack Father         massive MI at age 67    No Known Problems Sister     No Known Problems Brother     No Known Problems Sister     No Known Problems Sister     Heart failure Paternal Grandmother     Diabetes Paternal Aunt     Hypertension Paternal Aunt     Leukemia Paternal Aunt         CLL    Suicide Paternal Uncle      Anesthesia problems Paternal Uncle     Diabetes Paternal Aunt     Stroke Paternal Aunt     Valvular heart disease Maternal Aunt     Kidney disease Neg Hx     Colon cancer Neg Hx        Social History     Socioeconomic History    Marital status: Single   Tobacco Use    Smoking status: Never Smoker    Smokeless tobacco: Never Used   Substance and Sexual Activity    Alcohol use: No     Comment: quit in 2016; does have heavy drinking history; started drinking at age 13; was drinking a 12 pack over the weekend and two 24 ounces of beer a day during the week along with a pint of hard alcohol    Drug use: No    Sexual activity: Not Currently   Social History Narrative    Caregiver Nurse Marjan Swain; 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       Current Outpatient Medications   Medication Sig Dispense Refill    albuterol (VENTOLIN HFA) 90 mcg/actuation inhaler Inhale 2 puffs into the lungs every 6 (six) hours as needed for Wheezing or Shortness of Breath. Rescue 18 g 3    amLODIPine (NORVASC) 10 MG tablet Take 1 tablet by mouth every day 30 tablet 3    amoxicillin-clavulanate 500-125mg (AUGMENTIN) 500-125 mg Tab Take 1 tablet (500 mg total) by mouth once daily. 14 tablet 0    ascorbic acid, vitamin C, (VITAMIN C) 500 MG tablet Take 1 tablet (500 mg total) by mouth 2 (two) times daily.      b complex vitamins tablet Take 1 tablet by mouth once daily.      calcium acetate,phosphat bind, (PHOSLO) 667 mg capsule Take 2 capsules (1,334 mg total) by mouth 3 (three) times daily. 180 capsule 3    dialysis solutions (PERITONEAL DIALYSIS SOLUTION IP) Inject into the peritoneum every Mon, Wed, Fri. Lt FA Fistula, [facility] on M, W, F.      diltiaZEM (CARDIZEM CD) 360 MG 24 hr capsule Take 1 capsule (360 mg total) by mouth once daily. 90 capsule 1    epoetin vikki (PROCRIT) 10,000 unit/mL injection Inject 1 mL (10,000 Units total) into the skin every Mon, Wed, Fri. To be given with HD      ferrous  sulfate 324 mg (65 mg iron) TbEC Take 325 mg by mouth once daily.      fish oil-omega-3 fatty acids 300-1,000 mg capsule Take 2 g by mouth once daily.      hydrALAZINE (APRESOLINE) 100 MG tablet TAKE ONE TABLET BY MOUTH THREE TIMES DAILY INCLUDING DIALYSIS DAYS 90 tablet 5    hydroCHLOROthiazide (MICROZIDE) 12.5 mg capsule       hydrOXYzine HCL (ATARAX) 25 MG tablet take 1 tab daily as needed for itching 30 tablet 3    hydrOXYzine pamoate (VISTARIL) 25 MG Cap take 1 capsule by mouth daily as needed for itching 30 capsule 3    labetaloL (NORMODYNE) 200 MG tablet Take 1 tablet (200 mg total) by mouth 3 (three) times daily. 90 tablet 3    labetaloL (NORMODYNE) 200 MG tablet Take 1 tablet by mouth three times daily 90 tablet 6    labetaloL (NORMODYNE) 200 MG tablet Take 1 tablet (200 mg total) by mouth 3 (three) times daily. 90 tablet 6    lisinopriL (PRINIVIL,ZESTRIL) 40 MG tablet Take 1 tablet (40 mg total) by mouth once daily. 30 tablet 6    multivitamin with minerals tablet Take 1 tablet by mouth once daily.      omega-3 fatty acids-vitamin E (FISH OIL) 1,000 mg Cap Take 1 capsule by mouth once daily.  0    pantoprazole (PROTONIX) 40 MG tablet TAKE ONE TABLET BY MOUTH EVERY DAY 30 tablet 8    ETHAN-RACQUEL RX 1- mg-mg-mcg Tab TAKE ONE TABLET BY MOUTH EVERY DAY 90 tablet 1    tenofovir disoproxil fumarate (VIREAD) 300 mg Tab TAKE 1 TABLET BY MOUTH ONCE WEEKLY 30 tablet 2    triamcinolone acetonide 0.1% (KENALOG) 0.1 % ointment Apply topically 2 (two) times daily. 80 g 0    vit b cmplx 3-fa-vit c-biotin 1- mg-mg-mcg (ETHAN-RACQUEL RX) 1- mg-mg-mcg Tab Take 1 tablet by mouth once daily. 90 tablet 2    vitamin D (VITAMIN D3) 1000 units Tab Take 1 tablet by mouth daily 30 tablet 6    warfarin (COUMADIN) 1 MG tablet       warfarin (COUMADIN) 7.5 MG tablet Take 2 tablets by mouth on Tuesday, Thursdays, and Saturday; and 1 tablet on all other days of the week or as directed by coumadin clinic.  (Patient taking differently: Take 1 tablet daily or as directed by coumadin clinic.) 45 tablet 6     No current facility-administered medications for this visit.     Facility-Administered Medications Ordered in Other Visits   Medication Dose Route Frequency Provider Last Rate Last Admin    0.9%  NaCl infusion   Intravenous Continuous Christopher Jane MD        sodium chloride 0.9% flush 10 mL  10 mL Intravenous PRN Christopher Jane MD           Review of patient's allergies indicates:  No Known Allergies      There were no vitals filed for this visit.          Physical Exam   Nursing Notes and Vital Signs Reviewed.     Constitutional: Well developed, well nourished. AAOx3, NAD, speech/ comprehension clear   Head: Atraumatic. Normocephalic.   Eyes: PERRL. EOMI. Conjunctivae are not pale. No scleral icterus.   ENT: Mucous membranes are dry. No tongue tremors. Throat clear.  Neck: Supple. No JVD or LN or Carotid Bruits noted B.  Cardiovascular: S1S2 RRR, no murmurs, rubs, or gallops. Distal pulses are 2+ and symmetric.   Pulmonary/Chest: No evidence of respiratory distress. Clear to auscultation bilaterally. No wheezing, rales or rhonchi. No chest wall TTP.   Abdominal: Soft and non-distended. There is no tenderness. No rebound, guarding, or rigidity. No organomegaly. No mass or viscera palpable  Musculoskeletal: FROM in all extremities. No deformities, no TTP, no edema. No midline spinal TTP. No step-offs. Pelvis is stable to compression. No cyanosis. Moves all four extremities.   Skin: Skin is warm and dry.   Neurological: No gross neurological deficits, Strength 5/5 B, is equal in the upper and lower extremities bilaterally. No sensory deficits to light touch. No pronator drift.  DTRs are 2+ and equal throughout.   Psychiatric: Good eye contact. Normal Affect.      Laboratory Data:  Reviewed and noted in plan where applicable- Please see chart for full laboratory data.       Lab Results   Component Value Date    WBC  5.79 01/27/2022    HGB 13.1 (L) 01/27/2022    HCT 41.5 01/27/2022     (H) 01/27/2022     01/27/2022     BMP  Lab Results   Component Value Date     01/27/2022    K 5.2 (H) 01/27/2022    CL 98 01/27/2022    CO2 28 01/27/2022    BUN 43 (H) 01/27/2022    CREATININE 12.5 (H) 01/27/2022    CALCIUM 9.9 01/27/2022    ANIONGAP 15 01/27/2022    ESTGFRAFRICA 4.7 (A) 01/27/2022    EGFRNONAA 4.1 (A) 01/27/2022     CMP  Sodium   Date Value Ref Range Status   01/27/2022 141 136 - 145 mmol/L Final     Potassium   Date Value Ref Range Status   01/27/2022 5.2 (H) 3.5 - 5.1 mmol/L Final     Chloride   Date Value Ref Range Status   01/27/2022 98 95 - 110 mmol/L Final     CO2   Date Value Ref Range Status   01/27/2022 28 23 - 29 mmol/L Final     Glucose   Date Value Ref Range Status   01/27/2022 85 70 - 110 mg/dL Final     BUN   Date Value Ref Range Status   01/27/2022 43 (H) 6 - 20 mg/dL Final     Creatinine   Date Value Ref Range Status   01/27/2022 12.5 (H) 0.5 - 1.4 mg/dL Final     Calcium   Date Value Ref Range Status   01/27/2022 9.9 8.7 - 10.5 mg/dL Final     Total Protein   Date Value Ref Range Status   01/27/2022 8.1 6.0 - 8.4 g/dL Final     Albumin   Date Value Ref Range Status   01/27/2022 3.6 3.5 - 5.2 g/dL Final     Total Bilirubin   Date Value Ref Range Status   01/27/2022 0.6 0.1 - 1.0 mg/dL Final     Comment:     For infants and newborns, interpretation of results should be based  on gestational age, weight and in agreement with clinical  observations.    Premature Infant recommended reference ranges:  Up to 24 hours.............<8.0 mg/dL  Up to 48 hours............<12.0 mg/dL  3-5 days..................<15.0 mg/dL  6-29 days.................<15.0 mg/dL       Alkaline Phosphatase   Date Value Ref Range Status   01/27/2022 88 55 - 135 U/L Final     AST   Date Value Ref Range Status   01/27/2022 14 10 - 40 U/L Final     ALT   Date Value Ref Range Status   01/27/2022 12 10 - 44 U/L Final     Anion Gap    Date Value Ref Range Status   01/27/2022 15 8 - 16 mmol/L Final     eGFR if    Date Value Ref Range Status   01/27/2022 4.7 (A) >60 mL/min/1.73 m^2 Final     eGFR if non    Date Value Ref Range Status   01/27/2022 4.1 (A) >60 mL/min/1.73 m^2 Final     Comment:     Calculation used to obtain the estimated glomerular filtration  rate (eGFR) is the CKD-EPI equation.        Lab Results   Component Value Date    CALCIUM 9.9 01/27/2022    PHOS 3.6 01/23/2021     Lab Results   Component Value Date    K 5.2 (H) 01/27/2022     Lab Results   Component Value Date    LABPROT 15.6 (H) 01/27/2022    ALBUMIN 3.6 01/27/2022     Lab Results   Component Value Date    HGBA1C 4.8 09/20/2019       BMP  @[id number](GLU,NA,K,Cl,CO2,BUN,Creatinine,Calcium,MG)@      Radiology:  Reviewed and noted in plan where applicable- Please see chart for full radiology data.            ASSESSMENT/PLAN:     Patient Active Problem List   Diagnosis    Essential hypertension    Hypertensive cardiomegaly with heart failure    Pulmonary HTN    Adult congenital heart disease    Hyperglycemia    History of mechanical aortic valve replacement    Postprocedural hypotension    ESRD (end stage renal disease) on dialysis    Severe protein-calorie malnutrition    Anemia of chronic disease    Chronic diastolic heart failure    Atrial tachycardia    History of radiofrequency ablation for complex right atrial arrhythmia    Drug-induced erectile dysfunction    Chronic pulmonary heart disease    Secondary hyperparathyroidism of renal origin    Atrial fibrillation    Anticoagulated    Encounter for colorectal cancer screening    Colon cancer screening    Special screening for malignant neoplasms, colon    Abnormal diffusion capacity determined by pulmonary function test    Mixed restrictive and obstructive lung disease    DANIEL (obstructive sleep apnea)    Nocturnal hypoxemia due to obstructive chronic bronchitis     Pulmonary nodule, left    Psychophysiological insomnia    Primary snoring    Periodic limb movement disorder (PLMD)    Inadequate sleep hygiene    End stage renal disease    Arteriovenous graft infection    NSVT (nonsustained ventricular tachycardia)    Bleeding from dialysis shunt    Disorder of electrolytes    Anemia associated with chronic renal failure    Hepatic cirrhosis         PLAN:      Assessment and plan:    1.  ESRD:  Doing well on dialysis.  We will continue hemodialysis treatments three times a week, maintaining a URR of 70% or greater and a Kt/V of 1.20.  Currently, the patient is stable.    2.  Anemia:  We will check hemoglobin at least monthly, target range 10 to 11, transferrin saturation monthly, and ferritin quarterly.  We will dose Epogen and iron according to monthly blood work and according to protocol.    3.  Hypertension:  Currently controlled with current medications, sodium and fluid restrictions and dialysis prescription.     4.  Hyperparathyroidism secondary to renal origin:  We will check intact PTH on a quarterly basis.  We will dose vitamin D according to blood work and protocol.      Tara Yoon, DNP

## 2022-03-02 ENCOUNTER — TELEPHONE (OUTPATIENT)
Dept: TRANSPLANT | Facility: CLINIC | Age: 52
End: 2022-03-02
Payer: MEDICARE

## 2022-03-02 NOTE — TELEPHONE ENCOUNTER
Returned pt's call. Made him aware he will need cardiology clearance d/t recent abn echo results. This Rn will msg cardiologist with recent results for clearance/recs. This Rn advised the pt to call me in 2weeks if he has not heard from me or the cardiologist. In the meantime, chart is with Geovanni to see if we can get a sooner urology appt for clearance. The pt voiced understanding and all questions were answered.

## 2022-03-02 NOTE — TELEPHONE ENCOUNTER
----- Message from Kait Ruby RN sent at 3/2/2022  9:48 AM CST -----  Contact: [phone]    ----- Message -----  From: Debbie Odell  Sent: 3/2/2022   9:25 AM CST  To: [facility] Staff    Calling to get test results.  Name of test (lab, x-ray): echocardiogram and US  Date of test: 2/15  Where was the test performed:   Would you like a call back, or a response through your [facility] portal?:   Call back   Comments:

## 2022-03-03 ENCOUNTER — TELEPHONE (OUTPATIENT)
Dept: TRANSPLANT | Facility: CLINIC | Age: 52
End: 2022-03-03
Payer: MEDICARE

## 2022-03-03 NOTE — TELEPHONE ENCOUNTER
----- Message from Fuad Gudino MD sent at 3/2/2022  2:27 PM CST -----  Regarding: RE: cardiology clearance for kidney transplant candidate  HI    OK I have seen the recent ECHO with elevated PA pressures as in past, last time I saw him in Dec 2021 he was going to follow up with Dr. Pierce in pulm HTN clinic and possible repeat RHC and adjust medications which could improve his PA pressures. Please have him follow up with Dr. Pierce's clinic; his nuclear stress has been negative, I will repeat another later this year. Thanks     - PM  ----- Message -----  From: Aminah Rodriguez RN  Sent: 3/2/2022  10:27 AM CST  To: Fuad Gudino MD  Subject: cardiology clearance for kidney transplant c#    Hi Dr Gudino,  This patient is being considered for kidney/pancreas transplantation .  As part of our protocol, we require that this patient receive a letter of cardiology clearance and/or recommendations from you regarding this patient's disease process as it relates to transplantation. He has a recent echocardiogram in epic dated 2/15/22. Please comment if this patient is cleared from your standpoint to undergo transplantation.     Respectfully,  PK Gilman, RN

## 2022-03-03 NOTE — TELEPHONE ENCOUNTER
----- Message from Karishma Moore NP sent at 3/3/2022  8:07 AM CST -----  Regarding: RE: cardiology clearance for kidney transplant candidate  Hello,    In August 2021, Dr. Pierce performed a right heart cath which showed elevated filling pressures and included the recommendations below. If you need us to re-cath him, please let us know.    Thanks!  Yue Moore NP  [facility]    _______________________________  ·Believe PA pressures are elevated in setting of increased filling pressures. Mean PA pressure is right around 34.  ·Recommend stricter volume management, review of echoes for the last 3 year demonstrates similar findings.  ·If this is a barrier to renal transplant, can consider adding PDE5, however do not think he will be able to tolerate given increased filling pressure and patient stating he drops pressures with HD.        ----- Message -----  From: Fuad Gudino MD  Sent: 3/2/2022   2:30 PM CST  To: Mariah Pierce MD, Aminah Rodriguez, RN, #  Subject: RE: cardiology clearance for kidney transpla#    HI    OK I have seen the recent ECHO with elevated PA pressures as in past, last time I saw him in Dec 2021 he was going to follow up with Dr. Pierce in pulm HTN clinic and possible repeat RHC and adjust medications which could improve his PA pressures. Please have him follow up with Dr. Pierec's clinic; his nuclear stress has been negative, I will repeat another later this year. Thanks     - PM  ----- Message -----  From: Aminah Rodriguez RN  Sent: 3/2/2022  10:27 AM CST  To: Fuad Gudino MD  Subject: cardiology clearance for kidney transplant c#    Hi Dr Gudino,  This patient is being considered for kidney/pancreas transplantation .  As part of our protocol, we require that this patient receive a letter of cardiology clearance and/or recommendations from you regarding this patient's disease process as it relates to transplantation. He has a recent echocardiogram in epic dated  2/15/22. Please comment if this patient is cleared from your standpoint to undergo transplantation.     Respectfully,  WELLINGTON GilmanN, RN

## 2022-03-08 RX ORDER — CALCIUM ACETATE 667 MG/1
1334 CAPSULE ORAL 3 TIMES DAILY
Qty: 180 CAPSULE | Refills: 3 | Status: CANCELLED | OUTPATIENT
Start: 2022-03-08 | End: 2022-04-07

## 2022-03-08 RX ORDER — PANTOPRAZOLE SODIUM 40 MG/1
TABLET, DELAYED RELEASE ORAL
Qty: 30 TABLET | Refills: 8 | Status: CANCELLED | OUTPATIENT
Start: 2022-03-08

## 2022-03-10 ENCOUNTER — ANTI-COAG VISIT (OUTPATIENT)
Dept: CARDIOLOGY | Facility: CLINIC | Age: 52
End: 2022-03-10
Payer: MEDICARE

## 2022-03-10 DIAGNOSIS — Z79.01 LONG TERM (CURRENT) USE OF ANTICOAGULANTS: Primary | ICD-10-CM

## 2022-03-10 DIAGNOSIS — I48.91 ATRIAL FIBRILLATION, UNSPECIFIED TYPE: ICD-10-CM

## 2022-03-10 LAB — INR PPP: 3.1 (ref 2–3)

## 2022-03-10 PROCEDURE — 93793 PR ANTICOAGULANT MGMT FOR PT TAKING WARFARIN: ICD-10-PCS | Mod: ,,,

## 2022-03-10 PROCEDURE — 93793 ANTICOAG MGMT PT WARFARIN: CPT | Mod: ,,,

## 2022-03-10 PROCEDURE — 85610 PROTHROMBIN TIME: CPT | Mod: PBBFAC

## 2022-03-10 NOTE — PROGRESS NOTES
Patient's INR is slightly supra-therapeutic at 3.1.  Patient reports has not eaten any greens in a couple of weeks.  Advised importance of keeping diet consistent.  Advised of sign/symptoms of bleeding and need to go to ED if experiences any.  Reports no signs/symptoms of bleeding.  Instructions given: Eat serving of greens (1 cup) today 3/10/22;  re-challenge warfarin 7.5 mg every day.  Recheck in two weeks on 3/24/22.  Calendar reviewed with patient.  Patient verbalizes understanding.

## 2022-03-15 ENCOUNTER — TELEPHONE (OUTPATIENT)
Dept: TRANSPLANT | Facility: CLINIC | Age: 52
End: 2022-03-15
Payer: MEDICARE

## 2022-03-15 NOTE — TELEPHONE ENCOUNTER
----- Message from Mariah Pierce MD sent at 3/13/2022  9:55 PM CDT -----  Regarding: RE: cardiology clearance for kidney transplant candidate  We would only consider after showing PCWP dropped lower and PAP still somewhat elevated. This would take another RHC, if nephrology is still concerned about the PA pressures.   ----- Message -----  From: Fuad Gudino MD  Sent: 3/3/2022   2:26 PM CDT  To: Mariah Pierce MD, Aminah Rodriguez, RN, #  Subject: RE: cardiology clearance for kidney transpla#    OK thanks, so a PDE5 would not be indicated at this point and he can proceed for transplant with current meds?      ----- Message -----  From: Karishma Moore NP  Sent: 3/3/2022   8:10 AM CST  To: Fuad Gudino MD, Mariah Pierce MD, #  Subject: RE: cardiology clearance for kidney transpla#    Hello,    In August 2021, Dr. Pierce performed a right heart cath which showed elevated filling pressures and included the recommendations below. If you need us to re-cath him, please let us know.    Thanks!  Yue Moore NP  [facility]    _______________________________  ·Believe PA pressures are elevated in setting of increased filling pressures. Mean PA pressure is right around 34.  ·Recommend stricter volume management, review of echoes for the last 3 year demonstrates similar findings.  ·If this is a barrier to renal transplant, can consider adding PDE5, however do not think he will be able to tolerate given increased filling pressure and patient stating he drops pressures with HD.        ----- Message -----  From: Fuad Gudino MD  Sent: 3/2/2022   2:30 PM CST  To: Mariah Pierce MD, Aminah Rodriguez, RN, #  Subject: RE: cardiology clearance for kidney transpla#    HI    OK I have seen the recent ECHO with elevated PA pressures as in past, last time I saw him in Dec 2021 he was going to follow up with Dr. Pierce in pulm HTN clinic and possible repeat RHC and adjust medications which could improve his  PA pressures. Please have him follow up with Dr. Pierce's clinic; his nuclear stress has been negative, I will repeat another later this year. Thanks     - PM  ----- Message -----  From: Aminah Rodriguez RN  Sent: 3/2/2022  10:27 AM CST  To: Fuad Gudino MD  Subject: cardiology clearance for kidney transplant c#    Hi Dr Gudino,  This patient is being considered for kidney/pancreas transplantation .  As part of our protocol, we require that this patient receive a letter of cardiology clearance and/or recommendations from you regarding this patient's disease process as it relates to transplantation. He has a recent echocardiogram in epic dated 2/15/22. Please comment if this patient is cleared from your standpoint to undergo transplantation.     Respectfully,  WELLINGTON GilmanN, RN

## 2022-03-15 NOTE — TELEPHONE ENCOUNTER
----- Message from Mariah Pierce MD sent at 3/13/2022  9:45 PM CDT -----  Regarding: RE: cardiology clearance for kidney transplant candidate  Everything we stated previously, including what Yue Moore our PH NP replied previously still stands. That being said, if there is a concern from the nephrology team side, we are happy to repeat studies.   ----- Message -----  From: Aminah Rodriguez RN  Sent: 3/3/2022  10:50 AM CDT  To: Fuad Gudino MD, Mariah Pierce MD, #  Subject: RE: cardiology clearance for kidney transpla#    Hi Dr Pierce,  Will you please refer to the messages below and comment if this kidney transplant candidate is cleared from your standpoint. The patient had a heart cath on 8/12/21 and repeat echo on 2/15/22.     Respectfully,  PK Gilman, RN  ----- Message -----  From: Fuad Gudino MD  Sent: 3/2/2022   2:30 PM CST  To: Mariah Pierce MD, Aminah Rodriguez RN, #  Subject: RE: cardiology clearance for kidney transpla#    HI    OK I have seen the recent ECHO with elevated PA pressures as in past, last time I saw him in Dec 2021 he was going to follow up with Dr. Pierce in pulm HTN clinic and possible repeat RHC and adjust medications which could improve his PA pressures. Please have him follow up with Dr. Pierce's clinic; his nuclear stress has been negative, I will repeat another later this year. Thanks     - PM  ----- Message -----  From: Aminah Rodriguez RN  Sent: 3/2/2022  10:27 AM CST  To: Fuad Gudino MD  Subject: cardiology clearance for kidney transplant c#    Hi Dr Gudino,  This patient is being considered for kidney/pancreas transplantation .  As part of our protocol, we require that this patient receive a letter of cardiology clearance and/or recommendations from you regarding this patient's disease process as it relates to transplantation. He has a recent echocardiogram in epic dated 2/15/22. Please comment if this patient is cleared from your standpoint to undergo  transplantation.     Respectfully,  AMPARO Rodriguez, BSN, RN

## 2022-03-23 ENCOUNTER — TELEPHONE (OUTPATIENT)
Dept: TRANSPLANT | Facility: CLINIC | Age: 52
End: 2022-03-23
Payer: MEDICARE

## 2022-03-23 DIAGNOSIS — Z76.82 ORGAN TRANSPLANT CANDIDATE: Primary | ICD-10-CM

## 2022-03-24 ENCOUNTER — ANTI-COAG VISIT (OUTPATIENT)
Dept: CARDIOLOGY | Facility: CLINIC | Age: 52
End: 2022-03-24
Payer: MEDICARE

## 2022-03-24 DIAGNOSIS — Z79.01 LONG TERM (CURRENT) USE OF ANTICOAGULANTS: Primary | ICD-10-CM

## 2022-03-24 DIAGNOSIS — I48.91 ATRIAL FIBRILLATION, UNSPECIFIED TYPE: ICD-10-CM

## 2022-03-24 LAB — INR PPP: 1.7 (ref 2–3)

## 2022-03-24 PROCEDURE — 85610 PROTHROMBIN TIME: CPT | Mod: PBBFAC

## 2022-03-24 PROCEDURE — 93793 ANTICOAG MGMT PT WARFARIN: CPT | Mod: ,,,

## 2022-03-24 PROCEDURE — 93793 PR ANTICOAGULANT MGMT FOR PT TAKING WARFARIN: ICD-10-PCS | Mod: ,,,

## 2022-03-24 NOTE — PROGRESS NOTES
Patient's INR is sub-therapeutic at 1.7.  Patient reports followed previous instructions.  Reports ate greens more times than scheduled.  Re-educated on Coumadin Diet and importance of keeping diet consistent.  Advised of increased risk of clotting; signs/symptoms of clotting and need to go to ED if experiences any.  Reports no signs/symptoms of clotting.  Instructions given: Will give boosted dose of warfarin 11.25 mg today 3/24/22; and resume warfarin 7.5 mg every day.  Recheck in two weeks on 4/7/24. Calendar reviewed with patient.  Patient verbalizes understanding.

## 2022-04-07 ENCOUNTER — ANTI-COAG VISIT (OUTPATIENT)
Dept: CARDIOLOGY | Facility: CLINIC | Age: 52
End: 2022-04-07
Payer: MEDICARE

## 2022-04-07 DIAGNOSIS — I48.91 ATRIAL FIBRILLATION, UNSPECIFIED TYPE: ICD-10-CM

## 2022-04-07 DIAGNOSIS — Z79.01 LONG TERM (CURRENT) USE OF ANTICOAGULANTS: Primary | ICD-10-CM

## 2022-04-07 LAB — INR PPP: 2 (ref 2–3)

## 2022-04-07 PROCEDURE — 85610 PROTHROMBIN TIME: CPT | Mod: PBBFAC

## 2022-04-07 PROCEDURE — 93793 PR ANTICOAGULANT MGMT FOR PT TAKING WARFARIN: ICD-10-PCS | Mod: ,,,

## 2022-04-07 PROCEDURE — 93793 ANTICOAG MGMT PT WARFARIN: CPT | Mod: ,,,

## 2022-04-07 NOTE — PROGRESS NOTES
Patient's INR is therapeutic at 2.0.  Patient reports followed previous instructions.  Reports no changes.  Instructions given: continue warfarin 7.5 mg every day.  Recheck in two weeks on 4/21/22.  Calendar reviewed with patient.  Patient verbalizes understanding.

## 2022-04-12 DIAGNOSIS — I47.19 ATRIAL TACHYCARDIA: ICD-10-CM

## 2022-04-12 DIAGNOSIS — N18.6 ESRD (END STAGE RENAL DISEASE): ICD-10-CM

## 2022-04-12 DIAGNOSIS — Z95.2 S/P AVR (AORTIC VALVE REPLACEMENT): ICD-10-CM

## 2022-04-12 DIAGNOSIS — I35.0 NONRHEUMATIC AORTIC VALVE STENOSIS: ICD-10-CM

## 2022-04-12 DIAGNOSIS — I50.42 CHRONIC COMBINED SYSTOLIC AND DIASTOLIC HEART FAILURE: ICD-10-CM

## 2022-04-12 DIAGNOSIS — I42.8 NONISCHEMIC CARDIOMYOPATHY: ICD-10-CM

## 2022-04-12 DIAGNOSIS — I10 UNCONTROLLED HYPERTENSION: ICD-10-CM

## 2022-04-12 DIAGNOSIS — Q24.9 ADULT CONGENITAL HEART DISEASE: ICD-10-CM

## 2022-04-12 DIAGNOSIS — Z98.890 HISTORY OF RADIOFREQUENCY ABLATION FOR COMPLEX RIGHT ATRIAL ARRHYTHMIA: ICD-10-CM

## 2022-04-12 RX ORDER — AMLODIPINE BESYLATE 10 MG/1
10 TABLET ORAL DAILY
Qty: 30 TABLET | Refills: 3 | Status: CANCELLED | OUTPATIENT
Start: 2022-04-12

## 2022-04-12 RX ORDER — HYDRALAZINE HYDROCHLORIDE 100 MG/1
TABLET, FILM COATED ORAL
Qty: 90 TABLET | Refills: 5 | Status: CANCELLED | OUTPATIENT
Start: 2022-04-12

## 2022-04-25 ENCOUNTER — ANTI-COAG VISIT (OUTPATIENT)
Dept: CARDIOLOGY | Facility: CLINIC | Age: 52
End: 2022-04-25
Payer: MEDICARE

## 2022-04-25 DIAGNOSIS — Z79.01 LONG TERM (CURRENT) USE OF ANTICOAGULANTS: Primary | ICD-10-CM

## 2022-04-25 DIAGNOSIS — I48.91 ATRIAL FIBRILLATION, UNSPECIFIED TYPE: ICD-10-CM

## 2022-04-25 LAB — INR PPP: 1.6 (ref 2–3)

## 2022-04-25 PROCEDURE — 93793 PR ANTICOAGULANT MGMT FOR PT TAKING WARFARIN: ICD-10-PCS | Mod: ,,,

## 2022-04-25 PROCEDURE — 85610 PROTHROMBIN TIME: CPT | Mod: PBBFAC

## 2022-04-25 PROCEDURE — 93793 ANTICOAG MGMT PT WARFARIN: CPT | Mod: ,,,

## 2022-04-25 NOTE — PROGRESS NOTES
Patient's INR is sub-therapeutic at 1.6.  Patient reports followed previous instructions.  Report he ate a pickle.  Re-educated patient on Coumadin Diet and need to keep diet consistent.  Advised of signs/sy,ptoms of clotting and need to go to ED if experiences any.  Reports no signs/symptoms of clotting.  Instructions given:  Will give boosted dose of warfarin 11.25 mg today 4/25/22; then resume warfarin 7.5 mg every day.  Recheck on 5/10/22.  Calendar reviewed with patient.  Patient verbalizes understanding.

## 2022-04-28 ENCOUNTER — OFFICE VISIT (OUTPATIENT)
Dept: UROLOGY | Facility: CLINIC | Age: 52
End: 2022-04-28
Payer: MEDICARE

## 2022-04-28 VITALS
HEART RATE: 88 BPM | DIASTOLIC BLOOD PRESSURE: 80 MMHG | TEMPERATURE: 98 F | WEIGHT: 211 LBS | SYSTOLIC BLOOD PRESSURE: 138 MMHG | HEIGHT: 74 IN | BODY MASS INDEX: 27.08 KG/M2

## 2022-04-28 DIAGNOSIS — N28.1 RENAL CYST: Primary | ICD-10-CM

## 2022-04-28 DIAGNOSIS — R31.0 GROSS HEMATURIA: ICD-10-CM

## 2022-04-28 DIAGNOSIS — N52.9 ERECTILE DYSFUNCTION, UNSPECIFIED ERECTILE DYSFUNCTION TYPE: ICD-10-CM

## 2022-04-28 PROCEDURE — 99214 PR OFFICE/OUTPT VISIT, EST, LEVL IV, 30-39 MIN: ICD-10-PCS | Mod: S$PBB,NTX,, | Performed by: UROLOGY

## 2022-04-28 PROCEDURE — 99215 OFFICE O/P EST HI 40 MIN: CPT | Mod: PBBFAC,TXP | Performed by: UROLOGY

## 2022-04-28 PROCEDURE — 99999 PR PBB SHADOW E&M-EST. PATIENT-LVL V: ICD-10-PCS | Mod: PBBFAC,TXP,, | Performed by: UROLOGY

## 2022-04-28 PROCEDURE — 99214 OFFICE O/P EST MOD 30 MIN: CPT | Mod: S$PBB,NTX,, | Performed by: UROLOGY

## 2022-04-28 PROCEDURE — 99999 PR PBB SHADOW E&M-EST. PATIENT-LVL V: CPT | Mod: PBBFAC,TXP,, | Performed by: UROLOGY

## 2022-04-28 RX ORDER — TADALAFIL 20 MG/1
20 TABLET ORAL
Qty: 20 TABLET | Refills: 11 | Status: SHIPPED | OUTPATIENT
Start: 2022-04-28 | End: 2023-04-04

## 2022-04-28 NOTE — PROGRESS NOTES
Chief Complaint:   Encounter Diagnoses   Name Primary?    Renal cyst Yes    Gross hematuria     Erectile dysfunction, unspecified erectile dysfunction type          HPI:   4/28/22- patient does not void, but no gross hematuria.  Here today to discuss his cysts which we of been following.  He would also like a refill on the Cialis, not currently taking nitroglycerin, no chest pain or other cardiac symptoms.  2/28/19: 48 yo man voids about once a day since he is a MWF dialysis pt.  Saw a clot and gross hematuria about 2-3 weeks ago on one occasion.  No abd/pelvic pain and no exac/rel factors.  No urolithiasis.  Weak stream.  No  history.  Normal sexual function until recently.  Had a CT Urogram that shows no abnormalities except renal cystic function consistent with ESRD.    Allergies:  Patient has no known allergies.    Medications:  has a current medication list which includes the following prescription(s): amlodipine, amoxicillin-clavulanate 500-125mg, ascorbic acid (vitamin c), b complex vitamins, calcium acetate(phosphat bind), dialysis solutions, diltiazem, epoetin vikki, ferrous sulfate, fish oil-omega-3 fatty acids, hydralazine, hydrochlorothiazide, hydroxyzine hcl, hydroxyzine pamoate, labetalol, labetalol, labetalol, lisinopril, multivitamin with minerals, pantoprazole, pantoprazole, shawna-nataly rx, tenofovir disoproxil fumarate, triamcinolone acetonide 0.1%, shawna-ntaaly rx, vitamin d, warfarin, warfarin, albuterol, and omega-3 fatty acids-vitamin e, and the following Facility-Administered Medications: sodium chloride 0.9% and sodium chloride 0.9%.    Review of Systems:  General: No fever, chills, fatigability, or weight loss.  Skin: No rashes, itching, or changes in color or texture of skin.  Chest: Denies HAY, cyanosis, wheezing, cough, and sputum production.  Abdomen: Appetite fine. No weight loss. Denies diarrhea, abdominal pain, hematemesis, or blood in stool.  Musculoskeletal: No joint stiffness or  swelling. Denies back pain.  : As above.  All other review of systems negative.    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    PSH:   has a past surgical history that includes ASD repair; Cardiac valuve replacement (02/08/2017); Radiofrequency ablation (03/13/2017); Cardiac catheterization; AV Graft Creation (Left, 03/2017); Colonoscopy (N/A, 8/27/2019); Esophagogastroduodenoscopy (N/A, 8/27/2019); Removal of graft (Left, 7/2/2020); Colonoscopy (N/A, 9/3/2020); and Right heart catheterization (Right, 8/12/2021).    FamHx: family history includes Anesthesia problems in his paternal uncle; Cancer in his mother; Diabetes in his paternal aunt and paternal aunt; Heart attack in his father; Heart failure in his paternal grandmother; Hypertension in his paternal aunt; Leukemia in his mother and paternal aunt; No Known Problems in his brother, sister, sister, and sister; Stroke in his paternal aunt; Suicide in his paternal uncle; Valvular heart disease in his maternal aunt.    SocHx:  reports that he has never smoked. He has never used smokeless tobacco. He reports that he does not drink alcohol and does not use drugs.      Physical Exam:  Vitals:    04/28/22 1619   BP: 138/80   Pulse: 88   Temp: 97.5 °F (36.4 °C)     General: A&Ox3, no apparent distress, no deformities  Neck: No masses, normal thyroid  Lungs: normal inspiration, no use of accessory muscles  Heart: normal pulse, no arrhythmias  Abdomen: Soft, NT, ND  Skin: The skin is warm and dry. No jaundice.  Ext: No  c/c/e.     Labs/Studies:   Cysto negative 7/20  DARLENE bilateral complex renal cysts 2/22  CT urogram hyperdense cysts, no solid lesions 7/21      Impression/Plan:       1. Gross hematuria- no recent evidence of hematuria, no history of smoking.  Previous structural evaluation was negative by my partner.  Patient does not void, but will contact us with any gross hematuria.    2. Renal cyst- ultrasound demonstrates the cysts, no enhancing lesions per CT scan from this summer, nothing further except to monitor.  No need for delaying his renal transplant.    3. Erectile dysfunction- Cialis 20 mg has worked well in the past, he is not on nitroglycerin nor does he have chest pain, I have informed him that if he has any issues especially with his cardiac history to report to the emergency department immediately.

## 2022-05-09 ENCOUNTER — TELEPHONE (OUTPATIENT)
Dept: INTERNAL MEDICINE | Facility: CLINIC | Age: 52
End: 2022-05-09
Payer: MEDICARE

## 2022-05-09 NOTE — TELEPHONE ENCOUNTER
----- Message from Sunita Jaimes sent at 5/9/2022  5:51 PM CDT -----  Type: Requesting to speak with nurse         Who Called: PT  Regarding: Pt needing to be seen for the boil under his arm and no availability until the 26th and still lingering please advise   Would the patient rather a call back or a response via [facility]? Call back  Best Call Back Number: [phone]  Additional Information: n/a

## 2022-05-10 ENCOUNTER — ANTI-COAG VISIT (OUTPATIENT)
Dept: CARDIOLOGY | Facility: CLINIC | Age: 52
End: 2022-05-10
Payer: MEDICARE

## 2022-05-10 DIAGNOSIS — Z79.01 LONG TERM (CURRENT) USE OF ANTICOAGULANTS: Primary | ICD-10-CM

## 2022-05-10 DIAGNOSIS — I48.91 ATRIAL FIBRILLATION, UNSPECIFIED TYPE: ICD-10-CM

## 2022-05-10 LAB — INR PPP: 1.7 (ref 2–3)

## 2022-05-10 PROCEDURE — 93793 ANTICOAG MGMT PT WARFARIN: CPT | Mod: ,,,

## 2022-05-10 PROCEDURE — 85610 PROTHROMBIN TIME: CPT | Mod: PBBFAC

## 2022-05-10 PROCEDURE — 93793 PR ANTICOAGULANT MGMT FOR PT TAKING WARFARIN: ICD-10-PCS | Mod: ,,,

## 2022-05-10 NOTE — PROGRESS NOTES
INR not at goal. Medications, chart, and patient findings reviewed. See calendar for adjustments to dose and follow up plan.  Keep diet consistent.

## 2022-05-10 NOTE — PROGRESS NOTES
Patient's INR is sub-therapeutic at 1.7.  Patient reports followed previous instructions.  Reports eating pickles 2-3 times per week.  Re-educated on Coumadin Diet and need to keep diet consistent.  Advised of signs/symptoms of clotting and need to go to ED if experiences any.  Reports no signs/symptoms of clotting.  Sent to PharmD for dosing/instructions.

## 2022-05-24 ENCOUNTER — ANTI-COAG VISIT (OUTPATIENT)
Dept: CARDIOLOGY | Facility: CLINIC | Age: 52
End: 2022-05-24
Payer: MEDICARE

## 2022-05-24 DIAGNOSIS — I48.91 ATRIAL FIBRILLATION, UNSPECIFIED TYPE: ICD-10-CM

## 2022-05-24 DIAGNOSIS — Z79.01 LONG TERM (CURRENT) USE OF ANTICOAGULANTS: Primary | ICD-10-CM

## 2022-05-24 LAB — INR PPP: 1.7 (ref 2–3)

## 2022-05-24 PROCEDURE — 93793 PR ANTICOAGULANT MGMT FOR PT TAKING WARFARIN: ICD-10-PCS | Mod: ,,,

## 2022-05-24 PROCEDURE — 85610 PROTHROMBIN TIME: CPT | Mod: PBBFAC

## 2022-05-24 PROCEDURE — 93793 ANTICOAG MGMT PT WARFARIN: CPT | Mod: ,,,

## 2022-05-24 NOTE — PROGRESS NOTES
Patient's INR is subtherapeutic at 1.7--unchanged.  Patient reports followed previous warfarin dosing.  Reports ate increased amount of foods containing vitamin K.  Re-educated on Coumadin Diet and increased risk of clotting; signs/symptoms of clotting and need to go to ED if experiences any.  Reports no signs/symptoms of clotting.  Instructions given: will give boosted dose of warfarin 15 mg today 5/24/22; then resume warfarin 11.25 mg on Tuesdays; and 7.5 mg all other days.  Recheck on 6/2/22.  Calendars reviewed with patient.  Patient verbalizes understanding.

## 2022-05-30 DIAGNOSIS — I10 ESSENTIAL HYPERTENSION: ICD-10-CM

## 2022-05-30 DIAGNOSIS — I49.3 PVC'S (PREMATURE VENTRICULAR CONTRACTIONS): ICD-10-CM

## 2022-05-30 RX ORDER — DILTIAZEM HYDROCHLORIDE 360 MG/1
360 CAPSULE, EXTENDED RELEASE ORAL DAILY
Qty: 90 CAPSULE | Refills: 1 | Status: SHIPPED | OUTPATIENT
Start: 2022-05-30 | End: 2022-10-20 | Stop reason: SDUPTHER

## 2022-06-02 ENCOUNTER — ANTI-COAG VISIT (OUTPATIENT)
Dept: CARDIOLOGY | Facility: CLINIC | Age: 52
End: 2022-06-02
Payer: MEDICARE

## 2022-06-02 DIAGNOSIS — I48.91 ATRIAL FIBRILLATION, UNSPECIFIED TYPE: ICD-10-CM

## 2022-06-02 DIAGNOSIS — Z79.01 LONG TERM (CURRENT) USE OF ANTICOAGULANTS: Primary | ICD-10-CM

## 2022-06-02 LAB — INR PPP: 1.9 (ref 2–3)

## 2022-06-02 PROCEDURE — 85610 PROTHROMBIN TIME: CPT | Mod: PBBFAC

## 2022-06-02 NOTE — PROGRESS NOTES
Patient's INR is sub-therapeutic at 1.9.  Patient reports followed previous dosing instructions.  Reports have been eating more dark leafy greens than usual do to limited food choices.  Re-educated patient on Coumadin Clinic Diet and importance of keeping diet consistent.  Advised of increased risk of clotting; signs/symptoms of clotting and need to go to ED if experiences any.  Reports no signs/symptoms of clotting.  Instructions given: Will give boosted dose of warfarin 11.25 mg today 6/2/22; then resume warfarin 11.25 mg on Tuesdays; and 7.5 mg all other days.  Recheck on 6/7/22.  Calendar reviewed with patient . Patient verbalizes understanding.

## 2022-06-07 ENCOUNTER — ANTI-COAG VISIT (OUTPATIENT)
Dept: CARDIOLOGY | Facility: CLINIC | Age: 52
End: 2022-06-07
Payer: MEDICARE

## 2022-06-07 DIAGNOSIS — I48.91 ATRIAL FIBRILLATION, UNSPECIFIED TYPE: ICD-10-CM

## 2022-06-07 DIAGNOSIS — Z79.01 LONG TERM (CURRENT) USE OF ANTICOAGULANTS: Primary | ICD-10-CM

## 2022-06-07 LAB — INR PPP: 2.9 (ref 2–3)

## 2022-06-07 PROCEDURE — 85610 PROTHROMBIN TIME: CPT | Mod: PBBFAC

## 2022-06-07 PROCEDURE — 93793 PR ANTICOAGULANT MGMT FOR PT TAKING WARFARIN: ICD-10-PCS | Mod: ,,,

## 2022-06-07 PROCEDURE — 93793 ANTICOAG MGMT PT WARFARIN: CPT | Mod: ,,,

## 2022-06-07 NOTE — PROGRESS NOTES
Patient's INR is therapeutic at 2.9.  Patient reports followed previous instructions.  Reports no changes.  Instructions given: continue warfarin 11.25 mg on Tuesdays; and 7.5 mg all other days.  Recheck in two weeks on 6/21/225. Calendar reviewed with patient .  Patient verbalizes understanding.

## 2022-06-08 ENCOUNTER — TELEPHONE (OUTPATIENT)
Dept: TRANSPLANT | Facility: CLINIC | Age: 52
End: 2022-06-08
Payer: MEDICARE

## 2022-06-08 NOTE — TELEPHONE ENCOUNTER
Reviewed case with Dr Bhakta. Patient's most recent echo on 2/15/22 with PAP 58, previous on 1/21/21 was 72. Patient will need repeat echo before presentation and when at dry weight. Dr Bhakta sent a msg to neph asking if pt is at dry wt. If it is determined pt is at dry wt, will repeat echo now. If the pt is not at dry wt, will give 3 mos and repeat echo. In the meantime, will review case for anything that is outstanding for w/u.

## 2022-06-16 ENCOUNTER — TELEPHONE (OUTPATIENT)
Dept: TRANSPLANT | Facility: CLINIC | Age: 52
End: 2022-06-16
Payer: MEDICARE

## 2022-06-16 DIAGNOSIS — Z76.82 ORGAN TRANSPLANT CANDIDATE: Primary | ICD-10-CM

## 2022-06-16 NOTE — TELEPHONE ENCOUNTER
Dr Bhakta advised it is ok to sched pt for repeat echo now. Pt's nephrologist stated pt is at dry wt or even slightly under.

## 2022-06-16 NOTE — TELEPHONE ENCOUNTER
Notified the pt he will need a repeat echocardiogram and explained in detail the rational. The pt agreed and stated he is at his dry wt and he continues to reach that goal with dialysis as tolerated.

## 2022-06-20 ENCOUNTER — TELEPHONE (OUTPATIENT)
Dept: TRANSPLANT | Facility: CLINIC | Age: 52
End: 2022-06-20
Payer: MEDICARE

## 2022-06-21 ENCOUNTER — ANTI-COAG VISIT (OUTPATIENT)
Dept: CARDIOLOGY | Facility: CLINIC | Age: 52
End: 2022-06-21
Payer: MEDICARE

## 2022-06-21 DIAGNOSIS — Z79.01 LONG TERM (CURRENT) USE OF ANTICOAGULANTS: Primary | ICD-10-CM

## 2022-06-21 DIAGNOSIS — Z95.2 HISTORY OF MECHANICAL AORTIC VALVE REPLACEMENT: ICD-10-CM

## 2022-06-21 LAB — INR PPP: 2.4 (ref 2–3)

## 2022-06-21 PROCEDURE — 85610 PROTHROMBIN TIME: CPT | Mod: PBBFAC

## 2022-06-21 PROCEDURE — 93793 PR ANTICOAGULANT MGMT FOR PT TAKING WARFARIN: ICD-10-PCS | Mod: ,,,

## 2022-06-21 PROCEDURE — 93793 ANTICOAG MGMT PT WARFARIN: CPT | Mod: ,,,

## 2022-06-21 NOTE — PROGRESS NOTES
Patient's INR is therapeutic at 2.4.  Patient reports no changes.  Instructions given: continue warfarin 11.25 mg on Tuesdays; and 7.5 mg all other days.  Recheck on 7/21/22 with other appointments.  Calendar reviewed wit patient.  Patient verbalizes understanding.

## 2022-07-07 ENCOUNTER — OFFICE VISIT (OUTPATIENT)
Dept: CARDIOLOGY | Facility: CLINIC | Age: 52
End: 2022-07-07
Payer: MEDICARE

## 2022-07-07 ENCOUNTER — LAB VISIT (OUTPATIENT)
Dept: LAB | Facility: HOSPITAL | Age: 52
End: 2022-07-07
Attending: NURSE PRACTITIONER
Payer: MEDICARE

## 2022-07-07 ENCOUNTER — HOSPITAL ENCOUNTER (OUTPATIENT)
Dept: CARDIOLOGY | Facility: HOSPITAL | Age: 52
Discharge: HOME | End: 2022-07-07
Attending: STUDENT IN AN ORGANIZED HEALTH CARE EDUCATION/TRAINING PROGRAM
Payer: MEDICARE

## 2022-07-07 ENCOUNTER — HOSPITAL ENCOUNTER (OUTPATIENT)
Dept: CARDIOLOGY | Facility: HOSPITAL | Age: 52
Discharge: HOME | End: 2022-07-07
Attending: NURSE PRACTITIONER
Payer: MEDICARE

## 2022-07-07 VITALS
HEART RATE: 87 BPM | OXYGEN SATURATION: 98 % | BODY MASS INDEX: 27.08 KG/M2 | DIASTOLIC BLOOD PRESSURE: 80 MMHG | HEIGHT: 74 IN | WEIGHT: 212.06 LBS | BODY MASS INDEX: 27.22 KG/M2 | SYSTOLIC BLOOD PRESSURE: 138 MMHG | SYSTOLIC BLOOD PRESSURE: 140 MMHG | HEIGHT: 74 IN | WEIGHT: 211 LBS | HEART RATE: 84 BPM | DIASTOLIC BLOOD PRESSURE: 80 MMHG

## 2022-07-07 DIAGNOSIS — Z95.2 S/P AVR (AORTIC VALVE REPLACEMENT): ICD-10-CM

## 2022-07-07 DIAGNOSIS — Z98.890 HISTORY OF RADIOFREQUENCY ABLATION FOR COMPLEX RIGHT ATRIAL ARRHYTHMIA: ICD-10-CM

## 2022-07-07 DIAGNOSIS — I27.20 PULMONARY HTN: ICD-10-CM

## 2022-07-07 DIAGNOSIS — R07.9 CHEST PAIN, UNSPECIFIED TYPE: Primary | ICD-10-CM

## 2022-07-07 DIAGNOSIS — I10 ESSENTIAL HYPERTENSION: ICD-10-CM

## 2022-07-07 DIAGNOSIS — I50.32 CHRONIC DIASTOLIC HEART FAILURE: ICD-10-CM

## 2022-07-07 DIAGNOSIS — Z99.2 ESRD (END STAGE RENAL DISEASE) ON DIALYSIS: ICD-10-CM

## 2022-07-07 DIAGNOSIS — Z76.82 ORGAN TRANSPLANT CANDIDATE: ICD-10-CM

## 2022-07-07 DIAGNOSIS — Z79.01 LONG TERM (CURRENT) USE OF ANTICOAGULANTS: ICD-10-CM

## 2022-07-07 DIAGNOSIS — Z76.82 ORGAN TRANSPLANT CANDIDATE: Primary | ICD-10-CM

## 2022-07-07 DIAGNOSIS — N18.6 ESRD (END STAGE RENAL DISEASE) ON DIALYSIS: ICD-10-CM

## 2022-07-07 DIAGNOSIS — I49.3 PVC'S (PREMATURE VENTRICULAR CONTRACTIONS): ICD-10-CM

## 2022-07-07 DIAGNOSIS — Q24.9 ADULT CONGENITAL HEART DISEASE: ICD-10-CM

## 2022-07-07 DIAGNOSIS — Z95.2 HISTORY OF MECHANICAL AORTIC VALVE REPLACEMENT: ICD-10-CM

## 2022-07-07 DIAGNOSIS — R07.9 CHEST PAIN, UNSPECIFIED TYPE: ICD-10-CM

## 2022-07-07 LAB
AORTIC ROOT ANNULUS: 3.73 CM
AV INDEX (PROSTH): 0.52
AV MEAN GRADIENT: 15 MMHG
AV PEAK GRADIENT: 25 MMHG
AV VALVE AREA: 1.69 CM2
AV VELOCITY RATIO: 0.49
BSA FOR ECHO PROCEDURE: 2.24 M2
CV ECHO LV RWT: 0.64 CM
DOP CALC AO PEAK VEL: 2.48 M/S
DOP CALC AO VTI: 40.1 CM
DOP CALC LVOT AREA: 3.3 CM2
DOP CALC LVOT DIAMETER: 2.04 CM
DOP CALC LVOT PEAK VEL: 1.21 M/S
DOP CALC LVOT STROKE VOLUME: 67.62 CM3
DOP CALC MV VTI: 49.5 CM
DOP CALC RVOT PEAK VEL: 0.98 M/S
DOP CALC RVOT VTI: 19.8 CM
DOP CALCLVOT PEAK VEL VTI: 20.7 CM
E WAVE DECELERATION TIME: 504.99 MSEC
E/A RATIO: 0.85
ECHO LV POSTERIOR WALL: 1.43 CM (ref 0.6–1.1)
EJECTION FRACTION: 60 %
FRACTIONAL SHORTENING: 19 % (ref 28–44)
HCV RNA # SERPL NAA+PROBE: 2040.7 MMHG/S
INTERVENTRICULAR SEPTUM: 1.3 CM (ref 0.6–1.1)
IVRT: 91.34 MSEC
LA MAJOR: 5.18 CM
LA MINOR: 5.62 CM
LA WIDTH: 4.2 CM
LEFT ATRIUM SIZE: 4.53 CM
LEFT ATRIUM VOLUME INDEX MOD: 38.5 ML/M2
LEFT ATRIUM VOLUME INDEX: 39.1 ML/M2
LEFT ATRIUM VOLUME MOD: 85.79 CM3
LEFT ATRIUM VOLUME: 87.18 CM3
LEFT INTERNAL DIMENSION IN SYSTOLE: 3.61 CM (ref 2.1–4)
LEFT VENTRICLE DIASTOLIC VOLUME INDEX: 41 ML/M2
LEFT VENTRICLE DIASTOLIC VOLUME: 91.44 ML
LEFT VENTRICLE MASS INDEX: 107 G/M2
LEFT VENTRICLE SYSTOLIC VOLUME INDEX: 24.6 ML/M2
LEFT VENTRICLE SYSTOLIC VOLUME: 54.91 ML
LEFT VENTRICULAR INTERNAL DIMENSION IN DIASTOLE: 4.48 CM (ref 3.5–6)
LEFT VENTRICULAR MASS: 237.63 G
LV LATERAL E/E' RATIO: 16 M/S
LVOT MG: 3.03 MMHG
LVOT MV: 0.81 CM/S
MV MEAN GRADIENT: 11 MMHG
MV PEAK A VEL: 1.7 M/S
MV PEAK E VEL: 1.44 M/S
MV PEAK GRADIENT: 18 MMHG
MV STENOSIS PRESSURE HALF TIME: 146.45 MS
MV VALVE AREA BY CONTINUITY EQUATION: 1.37 CM2
MV VALVE AREA P 1/2 METHOD: 1.5 CM2
PISA TR MAX VEL: 3.39 M/S
PTH-INTACT SERPL-MCNC: 812.9 PG/ML (ref 9–77)
PV MEAN GRADIENT: 1.89 MMHG
PV PEAK VELOCITY: 1.49 CM/S
RA MAJOR: 4.58 CM
RA PRESSURE: 8 MMHG
RA WIDTH: 4.25 CM
RIGHT VENTRICULAR END-DIASTOLIC DIMENSION: 4.67 CM
SINUS: 3.76 CM
STJ: 3.69 CM
TDI LATERAL: 0.09 M/S
TR MAX PG: 46 MMHG
TRICUSPID ANNULAR PLANE SYSTOLIC EXCURSION: 1.15 CM
TV REST PULMONARY ARTERY PRESSURE: 54 MMHG

## 2022-07-07 PROCEDURE — 99999 PR PBB SHADOW E&M-EST. PATIENT-LVL V: ICD-10-PCS | Mod: PBBFAC,TXP,, | Performed by: STUDENT IN AN ORGANIZED HEALTH CARE EDUCATION/TRAINING PROGRAM

## 2022-07-07 PROCEDURE — 99999 PR PBB SHADOW E&M-EST. PATIENT-LVL V: CPT | Mod: PBBFAC,TXP,, | Performed by: STUDENT IN AN ORGANIZED HEALTH CARE EDUCATION/TRAINING PROGRAM

## 2022-07-07 PROCEDURE — 86706 HEP B SURFACE ANTIBODY: CPT | Mod: TXP | Performed by: NURSE PRACTITIONER

## 2022-07-07 PROCEDURE — 93005 ELECTROCARDIOGRAM TRACING: CPT | Mod: TXP

## 2022-07-07 PROCEDURE — 93010 EKG 12-LEAD: ICD-10-PCS | Mod: TXP,,, | Performed by: INTERNAL MEDICINE

## 2022-07-07 PROCEDURE — 93010 ELECTROCARDIOGRAM REPORT: CPT | Mod: TXP,,, | Performed by: INTERNAL MEDICINE

## 2022-07-07 PROCEDURE — 99215 OFFICE O/P EST HI 40 MIN: CPT | Mod: PBBFAC,25,TXP | Performed by: STUDENT IN AN ORGANIZED HEALTH CARE EDUCATION/TRAINING PROGRAM

## 2022-07-07 PROCEDURE — 36415 COLL VENOUS BLD VENIPUNCTURE: CPT | Mod: TXP | Performed by: NURSE PRACTITIONER

## 2022-07-07 PROCEDURE — 99214 OFFICE O/P EST MOD 30 MIN: CPT | Mod: S$PBB,TXP,, | Performed by: STUDENT IN AN ORGANIZED HEALTH CARE EDUCATION/TRAINING PROGRAM

## 2022-07-07 PROCEDURE — 99214 PR OFFICE/OUTPT VISIT, EST, LEVL IV, 30-39 MIN: ICD-10-PCS | Mod: S$PBB,TXP,, | Performed by: STUDENT IN AN ORGANIZED HEALTH CARE EDUCATION/TRAINING PROGRAM

## 2022-07-07 PROCEDURE — 83970 ASSAY OF PARATHORMONE: CPT | Mod: TXP | Performed by: NURSE PRACTITIONER

## 2022-07-07 PROCEDURE — 93306 ECHO (CUPID ONLY): ICD-10-PCS | Mod: 26,TXP,, | Performed by: INTERNAL MEDICINE

## 2022-07-07 PROCEDURE — 93306 TTE W/DOPPLER COMPLETE: CPT | Mod: 26,TXP,, | Performed by: INTERNAL MEDICINE

## 2022-07-07 PROCEDURE — 93306 TTE W/DOPPLER COMPLETE: CPT | Mod: TXP

## 2022-07-07 NOTE — PROGRESS NOTES
"Subjective:   Patient ID:  Isidro White Jr. is a 52 y.o. male who presents for cardiac consult of Chest Pain      Follow-up  Associated symptoms include chest pain.   Shortness of Breath  Associated symptoms include chest pain.   Chest Pain   Associated symptoms include back pain. Pertinent negatives include no palpitations or shortness of breath.   His past medical history is significant for CHF.   Congestive Heart Failure  Associated symptoms include chest pain. Pertinent negatives include no palpitations or shortness of breath.   Hypertension  Associated symptoms include chest pain. Pertinent negatives include no palpitations or shortness of breath.     The patient came in today for cardiac consult of Chest Pain    Isidro White Jr. is a 52 y.o. male pt with ASD closure at age 8 ([facility]), HFpEF, s/p AVR with a 22 mm mechanical valve and TV annuloplasty with a 28 mm ring 3/17, HTN, PHTN, SVT, EP performed RFA (3/13/17) and has been on HD - MWF since Feb 8,2016 here for CV follow up.     12/13/18  Holter from 9/4/18 revealed freq PVCs, dilt increased to 240 mg. No palpitations. Has been doing well lately, BP is well controlled. Labwork from HD has been normal. Still makes some urine, says he has some hematuria. Will need annual stress and 2D echo. He is also undergoing workup for HepB will see Dr. Youngblood.     3/15/19  Recent ER eval at [facility] - He presented with chest pain and shortness of breath. He is a dialysis patient has a history of anemia. He states "I believe that the lack of oxygen". Patient has received blood transfusions in the past but not recently. Patient had dialysis yesterday and is a Monday Wednesday Friday dialysis patient. His troponin is mildly elevated. His chest x-ray is relatively clear. We do not see a large effusion and there is no obvious infiltrate at this time. Patient states at times he has chest pain and it feels like he is smothering. The safest plan is to admit the " patient and repeat his cardiac enzymes and consider given the patient a blood transfusion. However the patient is a dialysis patient so we are unable to admit him at this facility. Patient nephrologist is an [facility] and [facility]  Pt Left AMA and now here for follow up.   Pt has SOB usually at night, feels suffocated and can hear himself snore and wakes up. No prior sleep study.      6/18/19  Pt will need EGD/Cscope. Will be bridged with heparin while holding coumadin and followed at coumadin clinic. Pt also needs liver biopsy, he will be ruled out for esophageal varicies. He also had recent admission for PNA, tx with abx. Now feels well, breathing normal. No CP/SOB.     9/19/19  Per recent workup - Fibroscan suggests cirrhosis and he has thrombocytopenia though no other findings of portal hypertension on imaging or EGD. OK to proceed with kidney transplant from hepatology standpoint. For renal transplant process- recent stress and echo are normal.     2/27/20  He was prescibed meds for ED - he was given Cialis 5 mg. Overall feels well, no CP/SOB. He was denied transplant recently but will have further workup next month with stress and echo. He will also need sleep study.   ECG - NSR, LAE, LAD, nonspec changes    6/23/20  ECHO and stress neg in 3/2020. ECHO with grade 2 DD, PA pressure 42 mmHg. Recent INR therapeutic. Sleep study since last visit neg.   Occ decreased energy/fatigue with exertion. He walks 6 labs around the park at times but other times can't do as much. Dry weight is 101 kg. Today he is 103 kg.  He will have cystoscopy in July by Dr. Bass.     7/16/20 - Hosp follow up   He was admitted to Medical Surgical Unit for Arteriovenous graft infection with left graft excision per Vascular Surgery.  IV antibiotics continued.  Heparin gtt noted.  Coumadin continued with pharmacy to dose.  Increased bleeding noted with anticoagulation adjusted.  H/H stable with downward trend  noted. On 7/5/2020, pt H/H declined with PRBCs x 1 unit given.  Pt remained stable and no bleeding from AVG site appreciated. He had NSVT BB and CCB adjusted as well.     INR 1.9 last week. He had to get PRBC 2 days due to HGB 6. He was scheduled to get staples out on Monday by Dr. Sidhu, had swelling and bleeding from AVG. He had a hematoma. He went to HD yesterday AM and was told his HGB was 7.0. He went to BR. His Epo was increased and iron increased.     10/27/20  Still undergoing workup for cirrhosis, unclear etiology. Will have repeat Fibroscan in December and maybe biopsy. INR 3.4 today. He has tried cialis with improvement. Bp stable. He had mild pain and swelling on left arm but improved now. He has been active.     12/23/20  He had PNA 2 years ago and feels like same symptoms. He has been having HAY, cough. No CP. He also has palpitations. He tried to get more fluid off with HD but could not get more due to cramping. 101.1 KG is dry weight.   ECG - NSR, LAD, Nonspec ST T changes    1/14/21  Last visit had elevated BNP, discussed needs extra fluid off at HD - dry weight is 98. He had to have blood transfusion, HGB was 6.6.  He had one unit of blood, he feels burning sensation in chest, and has more cough with trace blood.     2/9/21  His recent HGB is 7.6, he had blood transfusion last month at ER Hgb was 6.8. He will f/u with HD and increase Epo as needed. He still has more HAY. ECHO with severe MS, will refer back to Dr. Gastelum    5/11/21  Pt had repeat echo in March with mild to mod MS with mean gradient 6 mmHg. BP today elevated 150s/90s. HR 90s. He just had lunch with salty food possibly with elevated BP. He had 6 min walk and further pulm testing.     Eval by Dr. Flores - This patient was referred because of the incorrect diagnosis of severe mitral valve stenosis.  This echo was repeated here this morning (see above). He has MAC with mild MS and this does not need treatment at this time.  In fact,  renal transplantation will improve his CA++ metabolism and reduce the risk of progression of his MAC.  He is asymptomatic from all of his cardiac problems as they are well managed.  His is a good candidate for LDR renal transplantation.     7/1/21  Pt had recent transplant eval and denied due to elevated PASP > 50 mmHg. Discussed will need to have pulm HTN clinic eval and RHC. Nuclear stress in May neg.     10/14/21  BP low normal. HR elevated at HD now. He had RHC in Aug by Dr. Pierce had elevated filling pressures need more volume removal.     12/28/21  He will need a EGD, will need to be off heparin and bridged. He has not followed up with pul HTN clinic regarding PDE5 tx. He feels well otherwise, denies CP/SOB. Is active, compliant with HD and meds.      Patient feels  no PND, no palpitation, no dizziness, no syncope, no CNS symptoms.    Patient has dec exercise tolerance.     Patient is compliant with medications.        7/7/22  Sees Dr. Gudino in clinic  Reports chest pain during dialysis yesterday, left sided  Believes the right amount of fluid was removed during   Symptoms recurred today when he woke up with chest pain with radiation to back, worse with movement  Reports having a bad mattress   INR 2.4  Stress test 5/21 without ischemia   EKG (similar 12/21, NSR,LAD, nonspec intraventricular block)    Denies SOB, diaphoresis, nausea, palpitations      RHC  Summary       · The estimated blood loss was <50 mL.  · Believe PA pressures are elevated in setting of increased filling pressures. Mean PA pressure is right around 34.  · Recommend stricter volume management, review of echoes for the last 3 year demonstrates similar findings.  · If this is a barrier to renal transplant, can consider adding PDE5, however do not think he will be able to tolerate given increased filling pressure and patient stating he drops pressures with HD.  · Will likely need to discuss with abdominal transplant team.     The procedure  log was documented by Documenter: Isis Sevilla and verified by Mariah Pierce MD.      Results for orders placed during the hospital encounter of 05/25/21    Nuclear Stress - Cardiology Interpreted    Interpretation Summary    Normal myocardial perfusion scan. There is no evidence of myocardial ischemia or infarction.    The gated perfusion images showed an ejection fraction of 65% at rest. The gated perfusion images showed an ejection fraction of 47% post stress.    The EKG portion of this study is negative for ischemia.      Results for orders placed during the hospital encounter of 03/04/21    Echo Color Flow Doppler? Yes; Bubble Contrast? No    Interpretation Summary  · The left ventricle is normal in size with normal systolic function. The estimated ejection fraction is 55%  · Mild right ventricular enlargement with mildly reduced right ventricular systolic function.  · Severe left atrial enlargement.  · Moderate right atrial enlargement.  · Interatrial septum bows to left, consider elevated right atrial pressure.  · There is a 22 mm Medtronic ATS open pivot mechanical valve bileaflet tilting disc mechanical aortic valve present.  · Degenerative mitral sclerosis; the mean diastolic gradient across the mitral valve is 6 mmHg at a heart rate of 70 bpm.  · The aortic valve mean gradient is 27 mmHg with a dimensionless index of 0.34.  · Mild mitral regurgitation.  · Mild tricuspid regurgitation.  · The estimated PA systolic pressure is 56 mmHg.  · There is pulmonary hypertension.  · Intermediate central venous pressure (8 mmHg).         Cath 2/17  1. Coronary angiography.      a.     Left main widely patent.      b.     LAD widely patent.      c.     Ramus widely patent and massive in size.      d.     Circumflex widely patent with a small OM 1 and OM 2 branch.      e.     Right coronary widely patent.  2. Hemodynamics:  Right heart catheterization.      a.     Pulmonary capillary wedge pressure 33 at  end-expiration.      b.     Pulmonary artery pressure 78/42.      c.     Right ventricular pressure 70/21.      d.     Right atrial pressure 22 with a peak V-wave of 24.      e.     Cardiac output by thermodilution is between 6 and 7       L/minute, by Patricia 6.1 L/minute.    CONCLUSION  1. Normal coronary arteries.  2. Pulmonary hypertension.  3. Severe aortic insufficiency.      Past Medical History:   Diagnosis Date    Anemia     Aortic valve stenosis     s/p mechanical AVR    Atrial fibrillation     Atrial flutter     Cardiomyopathy     CHF (congestive heart failure)     Drug-induced erectile dysfunction     ESRD due to hypertension     HD M, W, F    ESRD on dialysis     GERD (gastroesophageal reflux disease)     Hepatitis B     Hyperlipidemia     Hypertension     Nightmares     Obesity     DANIEL (obstructive sleep apnea) 11/12/2019    Secondary hyperparathyroidism of renal origin     Supraventricular tachycardia     atrial tachycardia    Tachycardia     Valvular regurgitation     AI, TR       Past Surgical History:   Procedure Laterality Date    ASD REPAIR      age 8 [facility]    AV Graft Creation Left 03/2017    CARDIAC CATHETERIZATION      [facility]     CARDIAC VALVE SURGERY  02/08/2017    22 mm Medtronic AV, 28 mm TV Medtronic annuloplaty ring    COLONOSCOPY N/A 8/27/2019    Procedure: COLONOSCOPY;  Surgeon: Addie John MD;  Location: [facility];  Service: Endoscopy;  Laterality: N/A;  dialysis pt *needs K*    COLONOSCOPY N/A 9/3/2020    Procedure: COLONOSCOPY-need INR;  Surgeon: Jemma Youngblood MD;  Location: [facility];  Service: Endoscopy;  Laterality: N/A;    ESOPHAGOGASTRODUODENOSCOPY N/A 8/27/2019    Procedure: EGD (ESOPHAGOGASTRODUODENOSCOPY);  Surgeon: Addie John MD;  Location: [facility];  Service: Endoscopy;  Laterality: N/A;    RADIOFREQUENCY ABLATION  03/13/2017    Atrial tachycardia    REMOVAL OF GRAFT Left 7/2/2020    Procedure: REMOVAL, GRAFT;  Surgeon:  Sascha Sidhu MD;  Location[facility] OR;  Service: Peripheral Vascular;  Laterality: Left;    RIGHT HEART CATHETERIZATION Right 8/12/2021    Procedure: INSERTION, CATHETER, RIGHT HEART;  Surgeon: Mariah Pierce MD;  Location: [facility] CATH LAB;  Service: Cardiology;  Laterality: Right;       Social History     Tobacco Use    Smoking status: Never Smoker    Smokeless tobacco: Never Used   Substance Use Topics    Alcohol use: No     Comment: quit in 2016; does have heavy drinking history; started drinking at age 13; was drinking a 12 pack over the weekend and two 24 ounces of beer a day during the week along with a pint of hard alcohol    Drug use: No       Family History   Problem Relation Age of Onset    Leukemia Mother     Cancer Mother     Heart attack Father         massive MI at age 67    No Known Problems Sister     No Known Problems Brother     No Known Problems Sister     No Known Problems Sister     Heart failure Paternal Grandmother     Diabetes Paternal Aunt     Hypertension Paternal Aunt     Leukemia Paternal Aunt         CLL    Suicide Paternal Uncle     Anesthesia problems Paternal Uncle     Diabetes Paternal Aunt     Stroke Paternal Aunt     Valvular heart disease Maternal Aunt     Kidney disease Neg Hx     Colon cancer Neg Hx        Patient's Medications   New Prescriptions    No medications on file   Previous Medications    ALBUTEROL (VENTOLIN HFA) 90 MCG/ACTUATION INHALER    Inhale 2 puffs into the lungs every 6 (six) hours as needed for Wheezing or Shortness of Breath. Rescue    AMLODIPINE (NORVASC) 10 MG TABLET    Take 1 tablet (10 mg total) by mouth once daily.    AMOXICILLIN-CLAVULANATE 500-125MG (AUGMENTIN) 500-125 MG TAB    Take 1 tablet (500 mg total) by mouth once daily.    ASCORBIC ACID, VITAMIN C, (VITAMIN C) 500 MG TABLET    Take 1 tablet (500 mg total) by mouth 2 (two) times daily.    B COMPLEX VITAMINS TABLET    Take 1 tablet by mouth once daily.    CALCIUM  ACETATE,PHOSPHAT BIND, (PHOSLO) 667 MG CAPSULE    take 3 capsule three times a day with meals and 1 capsule twice a day with a snack    DIALYSIS SOLUTIONS (PERITONEAL DIALYSIS SOLUTION IP)    Inject into the peritoneum every Mon, Wed, Fri. Lt FA Fistula, [facility] on M, W, F.    DILTIAZEM (CARDIZEM CD) 360 MG 24 HR CAPSULE    Take 1 capsule (360 mg total) by mouth once daily.    EPOETIN TORRIE (PROCRIT) 10,000 UNIT/ML INJECTION    Inject 1 mL (10,000 Units total) into the skin every Mon, Wed, Fri. To be given with HD    FERROUS SULFATE 324 MG (65 MG IRON) TBEC    Take 325 mg by mouth once daily.    FISH OIL-OMEGA-3 FATTY ACIDS 300-1,000 MG CAPSULE    Take 2 g by mouth once daily.    HYDRALAZINE (APRESOLINE) 100 MG TABLET    Take 1 tablet (100 mg total) by mouth 3 (three) times daily including dialysis.    HYDROCHLOROTHIAZIDE (MICROZIDE) 12.5 MG CAPSULE        HYDROXYZINE HCL (ATARAX) 25 MG TABLET    Take 1 tablet by mouth daily as needed for itching    HYDROXYZINE PAMOATE (VISTARIL) 25 MG CAP    take 1 capsule by mouth daily as needed for itching    LABETALOL (NORMODYNE) 200 MG TABLET    Take 1 tablet (200 mg total) by mouth 3 (three) times daily.    LABETALOL (NORMODYNE) 200 MG TABLET    Take 1 tablet by mouth three times daily    LABETALOL (NORMODYNE) 200 MG TABLET    Take 1 tablet (200 mg total) by mouth 3 (three) times daily.    LISINOPRIL (PRINIVIL,ZESTRIL) 40 MG TABLET    Take 1 tablet (40 mg total) by mouth once daily.    MULTIVITAMIN WITH MINERALS TABLET    Take 1 tablet by mouth once daily.    OMEGA-3 FATTY ACIDS-VITAMIN E (FISH OIL) 1,000 MG CAP    Take 1 capsule by mouth once daily.    PANTOPRAZOLE (PROTONIX) 40 MG TABLET    TAKE ONE TABLET BY MOUTH EVERY DAY    PANTOPRAZOLE (PROTONIX) 40 MG TABLET    Take 1 tablet (40 mg total) by mouth once daily.    ETHAN-RACQUEL RX 1- MG-MG-MCG TAB    TAKE ONE TABLET BY MOUTH EVERY DAY    TADALAFIL (CIALIS) 20 MG TAB    Take 1 tablet (20 mg total) by  "mouth every 24 hours as needed (erectile dysfunction).    TENOFOVIR DISOPROXIL FUMARATE (VIREAD) 300 MG TAB    TAKE 1 TABLET BY MOUTH ONCE WEEKLY    TRIAMCINOLONE ACETONIDE 0.1% (KENALOG) 0.1 % OINTMENT    Apply topically 2 (two) times daily.    VIT B CMPLX 3-FA-VIT C-BIOTIN 1- MG-MG-MCG (ETHAN-RACQUEL RX) 1- MG-MG-MCG TAB    Take 1 tablet by mouth once daily.    VITAMIN D (VITAMIN D3) 1000 UNITS TAB    Take 1 tablet by mouth daily    WARFARIN (COUMADIN) 1 MG TABLET        WARFARIN (COUMADIN) 7.5 MG TABLET    Take 2 tablets by mouth on Tuesday, Thursdays, and Saturday; and 1 tablet on all other days of the week or as directed by coumadin clinic.   Modified Medications    No medications on file   Discontinued Medications    No medications on file       Review of Systems   HENT: Negative.    Eyes: Negative.    Respiratory: Negative for shortness of breath.    Cardiovascular: Positive for chest pain. Negative for palpitations.   Gastrointestinal: Negative.    Genitourinary: Negative for hematuria.   Musculoskeletal: Positive for back pain.   Skin: Negative.    Neurological: Negative.    Endo/Heme/Allergies: Negative.    Psychiatric/Behavioral: Negative.    All 12 systems otherwise negative.      Wt Readings from Last 3 Encounters:   07/07/22 96.2 kg (212 lb 1.3 oz)   07/07/22 95.7 kg (211 lb)   04/28/22 95.7 kg (211 lb)     Temp Readings from Last 3 Encounters:   04/28/22 97.5 °F (36.4 °C) (Oral)   02/03/22 98.6 °F (37 °C) (Oral)   01/13/22 98.3 °F (36.8 °C)     BP Readings from Last 3 Encounters:   07/07/22 138/80   07/07/22 (!) 140/80   04/28/22 138/80     Pulse Readings from Last 3 Encounters:   07/07/22 84   07/07/22 87   04/28/22 88       /80 (BP Location: Right arm, Patient Position: Sitting, BP Method: Medium (Manual))   Pulse 84   Ht 6' 2" (1.88 m)   Wt 96.2 kg (212 lb 1.3 oz)   SpO2 98%   BMI 27.23 kg/m²      Objective:   Physical Exam  Vitals and nursing note reviewed.   Constitutional:     "   General: He is not in acute distress.     Appearance: He is well-developed. He is not diaphoretic.   HENT:      Head: Normocephalic and atraumatic.      Nose: Nose normal.   Eyes:      General: No scleral icterus.     Conjunctiva/sclera: Conjunctivae normal.   Neck:      Thyroid: No thyromegaly.      Vascular: No JVD.   Cardiovascular:      Rate and Rhythm: Normal rate and regular rhythm.      Heart sounds: S1 normal and S2 normal. Murmur heard.    Midsystolic murmur is present at the upper right sternal border.    No friction rub. No gallop. No S3 or S4 sounds.      Comments: Crisp S2  Pulmonary:      Effort: Pulmonary effort is normal. No respiratory distress.      Breath sounds: Normal breath sounds. No stridor. No wheezing or rales.   Chest:      Chest wall: No tenderness.   Abdominal:      General: Bowel sounds are normal. There is no distension.      Palpations: Abdomen is soft. There is no mass.      Tenderness: There is no abdominal tenderness. There is no rebound.   Genitourinary:     Comments: Deferred  Musculoskeletal:         General: No tenderness or deformity. Normal range of motion.      Cervical back: Normal range of motion and neck supple.   Lymphadenopathy:      Cervical: No cervical adenopathy.   Skin:     General: Skin is warm and dry.      Coloration: Skin is not pale.      Findings: No erythema or rash.   Neurological:      Mental Status: He is alert and oriented to person, place, and time.      Motor: No abnormal muscle tone.      Coordination: Coordination normal.   Psychiatric:         Behavior: Behavior normal.         Thought Content: Thought content normal.         Judgment: Judgment normal.         Lab Results   Component Value Date     01/27/2022    K 5.2 (H) 01/27/2022    CL 98 01/27/2022    CO2 28 01/27/2022    BUN 43 (H) 01/27/2022    CREATININE 12.5 (H) 01/27/2022    GLU 85 01/27/2022    HGBA1C 4.8 09/20/2019    MG 2.3 08/12/2021    AST 14 01/27/2022    ALT 12 01/27/2022     ALBUMIN 3.6 01/27/2022    PROT 8.1 01/27/2022    BILITOT 0.6 01/27/2022    WBC 5.79 01/27/2022    HGB 13.1 (L) 01/27/2022    HCT 41.5 01/27/2022    HCT 25 (L) 02/08/2017     (H) 01/27/2022     01/27/2022    INR 2.4 06/21/2022    INR 1.5 (H) 01/27/2022    CHOL 248 (H) 11/05/2020    HDL 42 11/05/2020    LDLCALC 164.8 (H) 11/05/2020    TRIG 206 (H) 11/05/2020     (H) 07/01/2021     Assessment:      1. Organ transplant candidate    2. Long term (current) use of anticoagulants    3. History of mechanical aortic valve replacement    4. PVC's (premature ventricular contractions)    5. History of radiofrequency ablation for complex right atrial arrhythmia    6. Chronic diastolic heart failure    7. Essential hypertension    8. S/P AVR (aortic valve replacement)    9. ESRD (end stage renal disease) on dialysis    10. Pulmonary HTN        Had chest pain during dialysis yesterday.  Today woke up with pain in his back.  On exam, pain is reproducible, most likely due to musculoskeletal pain.  Denies associated symptoms.  EKG similar to prior.    Plan:   Recommend trying Tylenol  Follow-up echocardiogram  Continue current CV medications  Follow-up at dialysis sessions  INR at goal      RTC 3 months Dr. Gudino        Thank you for allowing me to participate in this patient's care. Please do not hesitate to contact me with any questions or concerns. Consult note has been forwarded to the referral physician.

## 2022-07-11 LAB
HBV SURFACE AB SER QL IA: NEGATIVE
HBV SURFACE AB SERPL IA-ACNC: <3 MIU/ML

## 2022-07-21 ENCOUNTER — OFFICE VISIT (OUTPATIENT)
Dept: HEPATOLOGY | Facility: CLINIC | Age: 52
End: 2022-07-21
Payer: MEDICARE

## 2022-07-21 ENCOUNTER — ANTI-COAG VISIT (OUTPATIENT)
Dept: CARDIOLOGY | Facility: CLINIC | Age: 52
End: 2022-07-21
Payer: MEDICARE

## 2022-07-21 ENCOUNTER — TELEPHONE (OUTPATIENT)
Dept: CARDIOLOGY | Facility: CLINIC | Age: 52
End: 2022-07-21
Payer: MEDICARE

## 2022-07-21 ENCOUNTER — HOSPITAL ENCOUNTER (OUTPATIENT)
Dept: RADIOLOGY | Facility: HOSPITAL | Age: 52
Discharge: HOME | End: 2022-07-21
Attending: NURSE PRACTITIONER
Payer: MEDICARE

## 2022-07-21 VITALS
WEIGHT: 212.31 LBS | DIASTOLIC BLOOD PRESSURE: 76 MMHG | RESPIRATION RATE: 18 BRPM | HEART RATE: 86 BPM | HEIGHT: 74 IN | SYSTOLIC BLOOD PRESSURE: 122 MMHG | BODY MASS INDEX: 27.25 KG/M2

## 2022-07-21 DIAGNOSIS — K74.60 CHRONIC HEPATITIS B WITH CIRRHOSIS: ICD-10-CM

## 2022-07-21 DIAGNOSIS — B18.1 CHRONIC HEPATITIS B WITH CIRRHOSIS: ICD-10-CM

## 2022-07-21 DIAGNOSIS — K74.60 HEPATIC CIRRHOSIS, UNSPECIFIED HEPATIC CIRRHOSIS TYPE, UNSPECIFIED WHETHER ASCITES PRESENT: ICD-10-CM

## 2022-07-21 DIAGNOSIS — K74.60 HEPATIC CIRRHOSIS, UNSPECIFIED HEPATIC CIRRHOSIS TYPE, UNSPECIFIED WHETHER ASCITES PRESENT: Primary | ICD-10-CM

## 2022-07-21 DIAGNOSIS — Z79.01 ANTICOAGULATED: ICD-10-CM

## 2022-07-21 PROCEDURE — 93793 ANTICOAG MGMT PT WARFARIN: CPT | Mod: ,,,

## 2022-07-21 PROCEDURE — 76705 ECHO EXAM OF ABDOMEN: CPT | Mod: TC,TXP

## 2022-07-21 PROCEDURE — 99215 OFFICE O/P EST HI 40 MIN: CPT | Mod: PBBFAC,25,TXP | Performed by: NURSE PRACTITIONER

## 2022-07-21 PROCEDURE — 93793 PR ANTICOAGULANT MGMT FOR PT TAKING WARFARIN: ICD-10-PCS | Mod: ,,,

## 2022-07-21 PROCEDURE — 76705 US ABDOMEN LIMITED: ICD-10-PCS | Mod: 26,TXP,, | Performed by: RADIOLOGY

## 2022-07-21 PROCEDURE — 76705 ECHO EXAM OF ABDOMEN: CPT | Mod: 26,TXP,, | Performed by: RADIOLOGY

## 2022-07-21 PROCEDURE — 99214 OFFICE O/P EST MOD 30 MIN: CPT | Mod: S$PBB,NTX,, | Performed by: NURSE PRACTITIONER

## 2022-07-21 PROCEDURE — 99214 PR OFFICE/OUTPT VISIT, EST, LEVL IV, 30-39 MIN: ICD-10-PCS | Mod: S$PBB,NTX,, | Performed by: NURSE PRACTITIONER

## 2022-07-21 PROCEDURE — 99999 PR PBB SHADOW E&M-EST. PATIENT-LVL V: CPT | Mod: PBBFAC,TXP,, | Performed by: NURSE PRACTITIONER

## 2022-07-21 PROCEDURE — 99999 PR PBB SHADOW E&M-EST. PATIENT-LVL V: ICD-10-PCS | Mod: PBBFAC,TXP,, | Performed by: NURSE PRACTITIONER

## 2022-07-21 NOTE — TELEPHONE ENCOUNTER
Patient has been advised of warfarin dosing/instructions per encounter 7/21/22. Patient states he has been out of warfarin for several days due to does not have the money to pay for it.  Will contact patient tomorrow to follow up.  Patient has been advised of signs/symptoms of clotting and need to go to ED if experiences any.

## 2022-07-21 NOTE — PROGRESS NOTES
Clinic Follow Up:  [facility] Follow Up Note    Reason for Follow Up:  The primary encounter diagnosis was Hepatic cirrhosis, unspecified hepatic cirrhosis type, unspecified whether ascites present. A diagnosis of Chronic hepatitis B with cirrhosis was also pertinent to this visit.    PCP: Primary Doctor No       HPI:  This is a 52 y.o. male here for follow up of the above  Pt states that he has been feeling overall well without any new complaints.   Labs are pending.   Continues with kidney transplant team for evaluation  No upper GI bleeding.  No ascites or BLE.  No overt confusion.  Has been taking Viread without side effect    Review of Systems   Constitutional: Negative for chills, fever, malaise/fatigue and weight loss.   Respiratory: Negative for cough.    Cardiovascular: Negative for chest pain.   Gastrointestinal:        Per HPI   Musculoskeletal: Negative for myalgias.   Skin: Negative for itching and rash.   Neurological: Negative for headaches.   Psychiatric/Behavioral: The patient is not nervous/anxious.        Medical History:  Past Medical History:   Diagnosis Date    Anemia     Aortic valve stenosis     s/p mechanical AVR    Atrial fibrillation     Atrial flutter     Cardiomyopathy     CHF (congestive heart failure)     Drug-induced erectile dysfunction     ESRD due to hypertension     HD M, W, F    ESRD on dialysis     GERD (gastroesophageal reflux disease)     Hepatitis B     Hyperlipidemia     Hypertension     Nightmares     Obesity     DANIEL (obstructive sleep apnea) 11/12/2019    Secondary hyperparathyroidism of renal origin     Supraventricular tachycardia     atrial tachycardia    Tachycardia     Valvular regurgitation     AI, TR       Surgical History:   Past Surgical History:   Procedure Laterality Date    [facility] 8 [facility]    AV Graft Creation Left 03/2017    CARD[facility]AC CATHETERIZATION      [facility]     CARDIAC VALVE SURGERY   02/08/2017    22 mm Medtronic AV, 28 mm TV Medtronic annuloplaty ring    COLONOSCOPY N/A 8/27/2019    Procedure: COLONOSCOPY;  Surgeon: Addie John MD;  Location: [facility] ENDO;  Service: Endoscopy;  Laterality: N/A;  dialysis pt *needs K*    COLONOSCOPY N/A 9/3/2020    Procedure: COLONOSCOPY-need INR;  Surgeon: Jemma Youngblood MD;  Location: [facility] ENDO;  Service: Endoscopy;  Laterality: N/A;    ESOPHAGOGASTRODUODENOSCOPY N/A 8/27/2019    Procedure: EGD (ESOPHAGOGASTRODUODENOSCOPY);  Surgeon: Addie John MD;  Location: [facility] ENDO;  Service: Endoscopy;  Laterality: N/A;    RADIOFREQUENCY ABLATION  03/13/2017    Atrial tachycardia    REMOVAL OF GRAFT Left 7/2/2020    Procedure: REMOVAL, GRAFT;  Surgeon: Sascha Sidhu MD;  Location: [facility] OR;  Service: Peripheral Vascular;  Laterality: Left;    RIGHT HEART CATHETERIZATION Right 8/12/2021    Procedure: INSERTION, CATHETER, RIGHT HEART;  Surgeon: Mariah Pierce MD;  Location: [facility] CATH LAB;  Service: Cardiology;  Laterality: Right;       Family History:   Family History   Problem Relation Age of Onset    Leukemia Mother     Cancer Mother     Heart attack Father         massive MI at age 67    No Known Problems Sister     No Known Problems Brother     No Known Problems Sister     No Known Problems Sister     Heart failure Paternal Grandmother     Diabetes Paternal Aunt     Hypertension Paternal Aunt     Leukemia Paternal Aunt         CLL    Suicide Paternal Uncle     Anesthesia problems Paternal Uncle     Diabetes Paternal Aunt     Stroke Paternal Aunt     Valvular heart disease Maternal Aunt     Kidney disease Neg Hx     Colon cancer Neg Hx        Social History:   Social History     Tobacco Use    Smoking status: Never Smoker    Smokeless tobacco: Never Used   Substance Use Topics    Alcohol use: No     Comment: quit in 2016; does have heavy drinking history; started drinking at age 13; was drinking a 12 pack over the weekend and two 24  ounces of beer a day during the week along with a pint of hard alcohol    Drug use: No       Allergies: Reviewed    Home Medications:  Current Outpatient Medications on File Prior to Visit   Medication Sig Dispense Refill    amLODIPine (NORVASC) 10 MG tablet Take 1 tablet (10 mg total) by mouth once daily. 30 tablet 6    ascorbic acid, vitamin C, (VITAMIN C) 500 MG tablet Take 1 tablet (500 mg total) by mouth 2 (two) times daily.      b complex vitamins tablet Take 1 tablet by mouth once daily.      calcium acetate,phosphat bind, (PHOSLO) 667 mg capsule take 3 capsule three times a day with meals and 1 capsule twice a day with a snack 390 capsule 12    dialysis solutions (PERITONEAL DIALYSIS SOLUTION IP) Inject into the peritoneum every Mon, Wed, Fri. Lt FA Fistula, [facility] on M, W, F.      diltiaZEM (CARDIZEM CD) 360 MG 24 hr capsule Take 1 capsule (360 mg total) by mouth once daily. 90 capsule 1    epoetin vikki (PROCRIT) 10,000 unit/mL injection Inject 1 mL (10,000 Units total) into the skin every Mon, Wed, Fri. To be given with HD      ferrous sulfate 324 mg (65 mg iron) TbEC Take 325 mg by mouth once daily.      fish oil-omega-3 fatty acids 300-1,000 mg capsule Take 2 g by mouth once daily.      hydrALAZINE (APRESOLINE) 100 MG tablet Take 1 tablet (100 mg total) by mouth 3 (three) times daily including dialysis. 90 tablet 6    hydroCHLOROthiazide (MICROZIDE) 12.5 mg capsule       hydrOXYzine HCL (ATARAX) 25 MG tablet Take 1 tablet by mouth daily as needed for itching 30 tablet 3    labetaloL (NORMODYNE) 200 MG tablet Take 1 tablet (200 mg total) by mouth 3 (three) times daily. 90 tablet 3    lisinopriL (PRINIVIL,ZESTRIL) 40 MG tablet Take 1 tablet (40 mg total) by mouth once daily. 30 tablet 6    multivitamin with minerals tablet Take 1 tablet by mouth once daily.      pantoprazole (PROTONIX) 40 MG tablet TAKE ONE TABLET BY MOUTH EVERY DAY 30 tablet 8    pantoprazole (PROTONIX)  40 MG tablet Take 1 tablet (40 mg total) by mouth once daily. 30 tablet 3    ETHAN-RACQUEL RX 1- mg-mg-mcg Tab TAKE ONE TABLET BY MOUTH EVERY DAY 90 tablet 1    tadalafiL (CIALIS) 20 MG Tab Take 1 tablet (20 mg total) by mouth every 24 hours as needed (erectile dysfunction). 20 tablet 11    tenofovir disoproxil fumarate (VIREAD) 300 mg Tab TAKE 1 TABLET BY MOUTH ONCE WEEKLY 30 tablet 2    vitamin D (VITAMIN D3) 1000 units Tab Take 1 tablet by mouth daily 30 tablet 6    warfarin (COUMADIN) 1 MG tablet       warfarin (COUMADIN) 7.5 MG tablet Take 2 tablets by mouth on Tuesday, Thursdays, and Saturday; and 1 tablet on all other days of the week or as directed by coumadin clinic. (Patient taking differently: Take 1 1/2 tablet on Tuesdays; and 1 tablet daily or as directed by coumadin clinic.) 45 tablet 6    albuterol (VENTOLIN HFA) 90 mcg/actuation inhaler Inhale 2 puffs into the lungs every 6 (six) hours as needed for Wheezing or Shortness of Breath. Rescue 18 g 3    amoxicillin-clavulanate 500-125mg (AUGMENTIN) 500-125 mg Tab Take 1 tablet (500 mg total) by mouth once daily. (Patient not taking: No sig reported) 14 tablet 0    hydrOXYzine pamoate (VISTARIL) 25 MG Cap take 1 capsule by mouth daily as needed for itching 30 capsule 3    labetaloL (NORMODYNE) 200 MG tablet Take 1 tablet by mouth three times daily 90 tablet 6    labetaloL (NORMODYNE) 200 MG tablet Take 1 tablet (200 mg total) by mouth 3 (three) times daily. 90 tablet 6    omega-3 fatty acids-vitamin E (FISH OIL) 1,000 mg Cap Take 1 capsule by mouth once daily.  0    triamcinolone acetonide 0.1% (KENALOG) 0.1 % ointment Apply topically 2 (two) times daily. (Patient not taking: Reported on 7/21/2022) 80 g 0    vit b cmplx 3-fa-vit c-biotin 1- mg-mg-mcg (ETHAN-RACQUEL RX) 1- mg-mg-mcg Tab Take 1 tablet by mouth once daily. 90 tablet 2     Current Facility-Administered Medications on File Prior to Visit   Medication Dose Route Frequency  "Provider Last Rate Last Admin    0.9%  NaCl infusion   Intravenous Continuous Christopher Jane MD        sodium chloride 0.9% flush 10 mL  10 mL Intravenous PRN Christopher Jane MD           Physical Exam:  Vital Signs:  /76   Pulse 86   Resp 18   Ht 6' 2" (1.88 m)   Wt 96.3 kg (212 lb 4.9 oz)   BMI 27.26 kg/m²   Body mass index is 27.26 kg/m².  Physical Exam  Vitals reviewed.   Constitutional:       Appearance: He is well-developed.   HENT:      Head: Normocephalic.   Eyes:      General: No scleral icterus.  Cardiovascular:      Rate and Rhythm: Normal rate.   Pulmonary:      Effort: Pulmonary effort is normal.   Abdominal:      General: There is no distension.      Tenderness: There is no abdominal tenderness.   Musculoskeletal:         General: Normal range of motion.      Cervical back: Normal range of motion.   Skin:     General: Skin is warm and dry.   Neurological:      Mental Status: He is alert and oriented to person, place, and time.         Labs: Pertinent labs reviewed.  MELD-Na score: 24 at 1/27/2022  9:14 AM  MELD score: 24 at 1/27/2022  9:14 AM  Calculated from:  Serum Creatinine: On dialysis. Using 4 mg/dL.  Serum Sodium: 141 mmol/L (Using max of 137 mmol/L) at 1/27/2022  9:14 AM  Total Bilirubin: 0.6 mg/dL (Using min of 1 mg/dL) at 1/27/2022  9:14 AM  INR(ratio): 1.5 at 1/27/2022  9:14 AM  Age: 52 years  EV: EGD 8/2019, no EV noted  HCC: US 7/22 without lesion or mass       Assessment:  1. Hepatic cirrhosis, unspecified hepatic cirrhosis type, unspecified whether ascites present    2. Chronic hepatitis B with cirrhosis        Recommendations:  Stable without new complaints  - labs pending  - will plan for labs and HCC screening every 6 months  - pt will be due for EV screening  He is currently undergoing cardiac workup.  Will plan for EGD after next visit if OK from cardiology     Return to Clinic:  6 months with pre-clinic labs and imaging.       "

## 2022-07-22 ENCOUNTER — TELEPHONE (OUTPATIENT)
Dept: CARDIOLOGY | Facility: CLINIC | Age: 52
End: 2022-07-22
Payer: MEDICARE

## 2022-07-22 NOTE — PROGRESS NOTES
Patient has been advised but states he has been out of warfarin for several days due to does not have the money to pay for it.  Will contact patient tomorrow to follow up.  Patient has been advised of signs/symptoms of clotting and need to go to ED if any signs/symptoms of clotting.

## 2022-07-22 NOTE — TELEPHONE ENCOUNTER
On 7/21/22 patient reports he has been out of warfarin for several days.  Today 7/22/22, patient reports he picked up warfarin from pharmacy.  Advised patient to take warfarin 15 mg today 7/22/22 then resume per calendar on 7/23/22.

## 2022-08-09 ENCOUNTER — ANTI-COAG VISIT (OUTPATIENT)
Dept: CARDIOLOGY | Facility: CLINIC | Age: 52
End: 2022-08-09
Payer: MEDICARE

## 2022-08-09 DIAGNOSIS — Z79.01 LONG TERM (CURRENT) USE OF ANTICOAGULANTS: Primary | ICD-10-CM

## 2022-08-09 DIAGNOSIS — I48.91 ATRIAL FIBRILLATION, UNSPECIFIED TYPE: ICD-10-CM

## 2022-08-09 LAB — INR PPP: 3.3 (ref 2–3)

## 2022-08-09 PROCEDURE — 93793 ANTICOAG MGMT PT WARFARIN: CPT | Mod: ,,,

## 2022-08-09 PROCEDURE — 85610 PROTHROMBIN TIME: CPT | Mod: PBBFAC

## 2022-08-09 PROCEDURE — 93793 PR ANTICOAGULANT MGMT FOR PT TAKING WARFARIN: ICD-10-PCS | Mod: ,,,

## 2022-08-09 NOTE — PROGRESS NOTES
Patient's INR is supra-therapeutic at 3.3.  Patient reports followed previous instructions.  Reports no changes.  Advised of increased risk of bleeding; signs/symptoms of bleeding and need to go to ED if experiences any.  Reports no sign/symptoms of bleeding.  Instructions given: take 7.5 mg today 8/9/22; then re-challenge warfarin 11.25 mg on Fridays; and 7.5 mg all other days.  Recheck in two weeks on 8/23/22.  Calendar reviewed with patient.  Patient verbalizes understanding.

## 2022-08-25 ENCOUNTER — ANTI-COAG VISIT (OUTPATIENT)
Dept: CARDIOLOGY | Facility: CLINIC | Age: 52
End: 2022-08-25
Payer: MEDICARE

## 2022-08-25 DIAGNOSIS — I48.91 ATRIAL FIBRILLATION, UNSPECIFIED TYPE: ICD-10-CM

## 2022-08-25 DIAGNOSIS — Z79.01 LONG TERM (CURRENT) USE OF ANTICOAGULANTS: Primary | ICD-10-CM

## 2022-08-25 LAB — INR PPP: 2.7 (ref 2–3)

## 2022-08-25 PROCEDURE — 93793 PR ANTICOAGULANT MGMT FOR PT TAKING WARFARIN: ICD-10-PCS | Mod: ,,,

## 2022-08-25 PROCEDURE — 93793 ANTICOAG MGMT PT WARFARIN: CPT | Mod: ,,,

## 2022-08-25 PROCEDURE — 85610 PROTHROMBIN TIME: CPT | Mod: PBBFAC

## 2022-08-25 NOTE — PROGRESS NOTES
Patient's INR is therapeutic at 2.7.  Patient reports followed previous instructions.    Reports no changes.  Instructions given:  Continue warfarin 11.25 mg on Tuesdays; and 7.5 mg all other days.  Recheck in two weeks on 9/8/22.  Calendar reviewed with patient.  Patient verbalizes understanding.

## 2022-09-06 RX ORDER — PANTOPRAZOLE SODIUM 40 MG/1
40 TABLET, DELAYED RELEASE ORAL DAILY
Qty: 30 TABLET | Refills: 3 | Status: SHIPPED | OUTPATIENT
Start: 2022-09-06 | End: 2023-02-16 | Stop reason: SDUPTHER

## 2022-09-08 ENCOUNTER — ANTI-COAG VISIT (OUTPATIENT)
Dept: CARDIOLOGY | Facility: CLINIC | Age: 52
End: 2022-09-08
Payer: MEDICARE

## 2022-09-08 DIAGNOSIS — Z79.01 ANTICOAGULATED: ICD-10-CM

## 2022-09-08 DIAGNOSIS — Z95.2 HISTORY OF MECHANICAL AORTIC VALVE REPLACEMENT: ICD-10-CM

## 2022-09-08 DIAGNOSIS — Z79.01 LONG TERM (CURRENT) USE OF ANTICOAGULANTS: Primary | ICD-10-CM

## 2022-09-08 DIAGNOSIS — I48.91 ATRIAL FIBRILLATION, UNSPECIFIED TYPE: ICD-10-CM

## 2022-09-08 DIAGNOSIS — Z95.2 H/O AORTIC VALVE REPLACEMENT: ICD-10-CM

## 2022-09-08 LAB — INR PPP: 2.7 (ref 2–3)

## 2022-09-08 PROCEDURE — 93793 ANTICOAG MGMT PT WARFARIN: CPT | Mod: ,,,

## 2022-09-08 PROCEDURE — 85610 PROTHROMBIN TIME: CPT | Mod: PBBFAC

## 2022-09-08 PROCEDURE — 93793 PR ANTICOAGULANT MGMT FOR PT TAKING WARFARIN: ICD-10-PCS | Mod: ,,,

## 2022-09-08 NOTE — PROGRESS NOTES
Patient's INR is therapeutic at 2.7.   Patient reports no changes.  Instructions given: continue warfarin 11.25 mg on Tuesdays; and 7.5 mg all other days.  Recheck in four week son 10/6/22.  Calendar reviewed with patient.  Patient verbalizes understanding.

## 2022-09-09 ENCOUNTER — TELEPHONE (OUTPATIENT)
Dept: PHARMACY | Facility: CLINIC | Age: 52
End: 2022-09-09
Payer: MEDICARE

## 2022-09-09 ENCOUNTER — TELEPHONE (OUTPATIENT)
Dept: TRANSPLANT | Facility: CLINIC | Age: 52
End: 2022-09-09
Payer: MEDICARE

## 2022-09-09 NOTE — TELEPHONE ENCOUNTER
----- Message from Consuelo Hewitt RN sent at 9/7/2022  4:26 PM CDT -----  Regarding: FW: Speak with office  Contact: Lina    ----- Message -----  From: Ariela Feliz  Sent: 9/7/2022   2:03 PM CDT  To: [facility] Pre-Kidney Transplant Clinical  Subject: Speak with office                                A representative of [employer] is calling to speak with coordinator about this pt.    Lina# [phone]

## 2022-09-09 NOTE — TELEPHONE ENCOUNTER
Spoke with patient concerning referral for medication.  Patient copays are 3.00-3.95.  There is no assistance available at this time.

## 2022-09-26 ENCOUNTER — TELEPHONE (OUTPATIENT)
Dept: TRANSPLANT | Facility: HOSPITAL | Age: 52
End: 2022-09-26
Payer: MEDICARE

## 2022-09-26 NOTE — TELEPHONE ENCOUNTER
52 year old male with the following problems:    ESRD HD - MWF since Feb 8,2016   Hep B-related cirrhosis> patient needs a liver biopsy to stage his liver disease and dec karol if he needs a liver transplant or not.   S/p  AVR with a 22 mm mechanical valve and TV annuloplasty with a 28 mm ring 3/17on chronic anticoagulation with coumadin  Pulmonary Hypertension evaluated in the past by heart failure    NM stress test 5/2021:    Conclusion         Normal myocardial perfusion scan. There is no evidence of myocardial ischemia or infarction.    The gated perfusion images showed an ejection fraction of 65% at rest. The gated perfusion images showed an ejection fraction of 47% post stress.    The EKG portion of this study is negative for ischemia.    RHC:  Summary        The estimated blood loss was <50 mL.  Believe PA pressures are elevated in setting of increased filling pressures. Mean PA pressure is right around 34.  Recommend stricter volume management, review of echoes for the last 3 year demonstrates similar findings.  If this is a barrier to renal transplant, can consider adding PDE5, however do not think he will be able to tolerate given increased filling pressure and patient stating he drops pressures with HD.  Will likely need to discuss with abdominal transplant team.     RA: 9/ 12/ 8 RV: 54/ 2/ 9 PA: 58/ 25/ 36 PWP: 20/ 21/ 20 .  Cardiac output was 7.47  by Patricia. Cardiac index is 3.32 L/min/m2.  Normal CO/CI off inotrope. Normal right and moderately elevated left sided filling pressure. Moderately elevated pulmonary artery pressures in setting of elevated left sided filling pressure.     The procedure log was documented by Documenter: Isis Sevilla and verified by Mariah Pierce MD.    2D echo 6/2022:  Summary    The left ventricle is normal in size with moderate concentric hypertrophy and normal systolic function.  Mild left atrial enlargement.  The estimated ejection fraction is 60%.  Grade II left  ventricular diastolic dysfunction.  There is abnormal septal wall motion consistent with post-operative status.  Normal right ventricular size with normal right ventricular systolic function.  Mild right atrial enlargement.  There is a bileaflet tilting disc mechanical aortic valve present.  The aortic valve mean gradient is 15 mmHg with a dimensionless index of 0.52.  The mean diastolic gradient across the mitral valve is 11 mmHg at a heart rate of bpm.  There is mild to moderate mitral stenosis.  There is moderate pulmonary hypertension.  Mild tricuspid regurgitation.  Intermediate central venous pressure (8 mmHg).  The estimated PA systolic pressure is 54 mmHg.  The aortic root is mildly dilated.     ASD closure at age 8 ([facility])  Cystic kidney disease and h/o gross hematuria in April 2022, resolved Cleared by Urology to proceed with transplant  H/o atrial tachycardia s/p RADIOFREQUENCY ABLATION in 2017  Had 6 min walk in August 2021. Saturation 98-91%. PFT was significant for mild obstruction but mod restriction. Pulmonary clear pt for kdiney transplant     Last evaluation of functional status: 11/2021:  He does laundry, cleaning and cooking at home. When he does basic activities on daily basis, gets windy after 15 minutes. . He does walk without any symptoms of chest pain, SOB, claudication for 2 miles but after climbing stairs he gets SOB . Ambulates with no assistive device.    Last colonoscopy in 2022: Plan to repeat colonoscopy in 2025 per GI staff. hyperplastic  polyp.    I will discuss the case with the team. I suspect will need to wait for the liver biopsy report result to make a final decision. I sent a message to Dr DONATO Youngblood and she told me will start working with her VIJAY to schedule the liver biopsy.     He is a high risk candidate due to his h/o Pulmonary hypertension, chronic anticoagulation, s/p AVR and cirrhosis associated with Hep B. Fore details see above.     Tomas Rowland MD   Staff  Transplant Nephrologist

## 2022-09-27 ENCOUNTER — OFFICE VISIT (OUTPATIENT)
Dept: CARDIOLOGY | Facility: CLINIC | Age: 52
End: 2022-09-27
Payer: MEDICARE

## 2022-09-27 VITALS
WEIGHT: 212.94 LBS | DIASTOLIC BLOOD PRESSURE: 68 MMHG | OXYGEN SATURATION: 97 % | SYSTOLIC BLOOD PRESSURE: 118 MMHG | HEART RATE: 83 BPM | BODY MASS INDEX: 27.34 KG/M2

## 2022-09-27 DIAGNOSIS — B18.1 CHRONIC VIRAL HEPATITIS B WITHOUT DELTA AGENT AND WITHOUT COMA: Primary | ICD-10-CM

## 2022-09-27 DIAGNOSIS — I48.91 ATRIAL FIBRILLATION, UNSPECIFIED TYPE: ICD-10-CM

## 2022-09-27 DIAGNOSIS — Z98.890 HISTORY OF RADIOFREQUENCY ABLATION FOR COMPLEX RIGHT ATRIAL ARRHYTHMIA: ICD-10-CM

## 2022-09-27 DIAGNOSIS — Z95.2 HISTORY OF MECHANICAL AORTIC VALVE REPLACEMENT: ICD-10-CM

## 2022-09-27 DIAGNOSIS — I27.9 CHRONIC PULMONARY HEART DISEASE: ICD-10-CM

## 2022-09-27 DIAGNOSIS — N18.6 END STAGE RENAL DISEASE: ICD-10-CM

## 2022-09-27 DIAGNOSIS — Q24.9 ADULT CONGENITAL HEART DISEASE: ICD-10-CM

## 2022-09-27 DIAGNOSIS — Z79.01 ANTICOAGULATED: ICD-10-CM

## 2022-09-27 DIAGNOSIS — Z95.2 H/O AORTIC VALVE REPLACEMENT: ICD-10-CM

## 2022-09-27 DIAGNOSIS — R07.9 CHEST PAIN, UNSPECIFIED TYPE: ICD-10-CM

## 2022-09-27 DIAGNOSIS — I50.32 CHRONIC DIASTOLIC HEART FAILURE: Primary | ICD-10-CM

## 2022-09-27 DIAGNOSIS — I10 ESSENTIAL HYPERTENSION: ICD-10-CM

## 2022-09-27 DIAGNOSIS — Z79.01 LONG TERM (CURRENT) USE OF ANTICOAGULANTS: ICD-10-CM

## 2022-09-27 DIAGNOSIS — I49.3 PVC'S (PREMATURE VENTRICULAR CONTRACTIONS): ICD-10-CM

## 2022-09-27 DIAGNOSIS — B18.1 CHRONIC HEPATITIS B: ICD-10-CM

## 2022-09-27 DIAGNOSIS — I27.20 PULMONARY HYPERTENSION: ICD-10-CM

## 2022-09-27 PROCEDURE — 99214 OFFICE O/P EST MOD 30 MIN: CPT | Mod: S$PBB,NTX,, | Performed by: INTERNAL MEDICINE

## 2022-09-27 PROCEDURE — 99214 OFFICE O/P EST MOD 30 MIN: CPT | Mod: PBBFAC,TXP | Performed by: INTERNAL MEDICINE

## 2022-09-27 PROCEDURE — 99999 PR PBB SHADOW E&M-EST. PATIENT-LVL IV: ICD-10-PCS | Mod: PBBFAC,TXP,, | Performed by: INTERNAL MEDICINE

## 2022-09-27 PROCEDURE — 99999 PR PBB SHADOW E&M-EST. PATIENT-LVL IV: CPT | Mod: PBBFAC,TXP,, | Performed by: INTERNAL MEDICINE

## 2022-09-27 PROCEDURE — 99214 PR OFFICE/OUTPT VISIT, EST, LEVL IV, 30-39 MIN: ICD-10-PCS | Mod: S$PBB,NTX,, | Performed by: INTERNAL MEDICINE

## 2022-09-27 NOTE — PROGRESS NOTES
Pt needs liver biopsy per his kidney transplant team  Will need OK to hold coumadin prior to procedure.

## 2022-09-27 NOTE — PROGRESS NOTES
"Subjective:   Patient ID:  Isidro White Jr. is a 52 y.o. male who presents for cardiac consult of No chief complaint on file.      Follow-up  Pertinent negatives include no chest pain.   Shortness of Breath  Pertinent negatives include no chest pain.   Chest Pain   Associated symptoms include malaise/fatigue. Pertinent negatives include no palpitations or shortness of breath.   His past medical history is significant for CHF.   Congestive Heart Failure  Pertinent negatives include no chest pain, palpitations or shortness of breath.   Hypertension  Associated symptoms include malaise/fatigue. Pertinent negatives include no chest pain, palpitations or shortness of breath.   The patient came in today for cardiac consult of No chief complaint on file.    Isidro White Jr. is a 52 y.o. male pt with ASD closure at age 8 ([facility]), HFpEF, s/p AVR with a 22 mm mechanical valve and TV annuloplasty with a 28 mm ring 3/17, HTN, PHTN, Mitral stenosis, SVT, EP performed RFA (3/13/17) and has been on HD - MWF since Feb 8,2016 here for CV follow up.     12/13/18  Holter from 9/4/18 revealed freq PVCs, dilt increased to 240 mg. No palpitations. Has been doing well lately, BP is well controlled. Labwork from HD has been normal. Still makes some urine, says he has some hematuria. Will need annual stress and 2D echo. He is also undergoing workup for HepB will see Dr. Youngblood.     3/15/19  Recent ER eval at [facility] - He presented with chest pain and shortness of breath. He is a dialysis patient has a history of anemia. He states "I believe that the lack of oxygen". Patient has received blood transfusions in the past but not recently. Patient had dialysis yesterday and is a Monday Wednesday Friday dialysis patient. His troponin is mildly elevated. His chest x-ray is relatively clear. We do not see a large effusion and there is no obvious infiltrate at this time. Patient states at times he has chest pain and it feels like " he is smothering. The safest plan is to admit the patient and repeat his cardiac enzymes and consider given the patient a blood transfusion. However the patient is a dialysis patient so we are unable to admit him at this facility. Patient nephrologist is an [facility] and [facility]  Pt Left AMA and now here for follow up.   Pt has SOB usually at night, feels suffocated and can hear himself snore and wakes up. No prior sleep study.      6/18/19  Pt will need EGD/Cscope. Will be bridged with heparin while holding coumadin and followed at coumadin clinic. Pt also needs liver biopsy, he will be ruled out for esophageal varicies. He also had recent admission for PNA, tx with abx. Now feels well, breathing normal. No CP/SOB.     9/19/19  Per recent workup - Fibroscan suggests cirrhosis and he has thrombocytopenia though no other findings of portal hypertension on imaging or EGD. OK to proceed with kidney transplant from hepatology standpoint. For renal transplant process- recent stress and echo are normal.     2/27/20  He was prescibed meds for ED - he was given Cialis 5 mg. Overall feels well, no CP/SOB. He was denied transplant recently but will have further workup next month with stress and echo. He will also need sleep study.   ECG - NSR, LAE, LAD, nonspec changes    6/23/20  ECHO and stress neg in 3/2020. ECHO with grade 2 DD, PA pressure 42 mmHg. Recent INR therapeutic. Sleep study since last visit neg.   Occ decreased energy/fatigue with exertion. He walks 6 labs around the park at times but other times can't do as much. Dry weight is 101 kg. Today he is 103 kg.  He will have cystoscopy in July by Dr. Bass.     7/16/20 - Hosp follow up   He was admitted to Medical Surgical Unit for Arteriovenous graft infection with left graft excision per Vascular Surgery.  IV antibiotics continued.  Heparin gtt noted.  Coumadin continued with pharmacy to dose.  Increased bleeding noted with  anticoagulation adjusted.  H/H stable with downward trend noted. On 7/5/2020, pt H/H declined with PRBCs x 1 unit given.  Pt remained stable and no bleeding from AVG site appreciated. He had NSVT BB and CCB adjusted as well.     INR 1.9 last week. He had to get PRBC 2 days due to HGB 6. He was scheduled to get staples out on Monday by Dr. Sidhu, had swelling and bleeding from AVG. He had a hematoma. He went to HD yesterday AM and was told his HGB was 7.0. He [facility] to [facility] His Epo was increased and iron increased.     10/27/20  Still undergoing workup for cirrhosis, unclear etiology. Will have repeat Fibroscan in December and maybe biopsy. INR 3.4 today. He has tried cialis with improvement. Bp stable. He had mild pain and swelling on left arm but improved now. He has been active.     12/23/20  He had PNA 2 years ago and feels like same symptoms. He has been having HAY, cough. No CP. He also has palpitations. He tried to get more fluid off with HD but could not get more due to cramping. 101.1 KG is dry weight.   ECG - NSR, LAD, Nonspec ST T changes    1/14/21  Last visit had elevated BNP, discussed needs extra fluid off at HD - dry weight is 98. He had to have blood transfusion, HGB was 6.6.  He had one unit of blood, he feels burning sensation in chest, and has more cough with trace blood.     2/9/21  His recent HGB is 7.6, he had blood transfusion last month at ER Hgb was 6.8. He will f/u with HD and increase Epo as needed. He still has more HAY. ECHO with severe MS, will refer back to Dr. Gastelum    5/11/21  Pt had repeat echo in March with mild to mod MS with mean gradient 6 mmHg. BP today elevated 150s/90s. HR 90s. He just had lunch with salty food possibly with elevated BP. He had 6 min walk and further pulm testing.     Eval by Dr. Flores - This patient was referred because of the incorrect diagnosis of severe mitral valve stenosis.  This echo was repeated here this morning (see above). He has MAC with mild MS  and this does not need treatment at this time.  In fact, renal transplantation will improve his CA++ metabolism and reduce the risk of progression of his MAC.  He is asymptomatic from all of his cardiac problems as they are well managed.  His is a good candidate for LDR renal transplantation.     7/1/21  Pt had recent transplant eval and denied due to elevated PASP > 50 mmHg. Discussed will need to have pulm HTN clinic eval and RHC. Nuclear stress in May neg.     10/14/21  BP low normal. HR elevated at HD now. He had RHC in Aug by Dr. Pierce had elevated filling pressures need more volume removal.     12/28/21  He will need a EGD, will need to be off heparin and bridged. He has not followed up with pul HTN clinic regarding PDE5 tx. He feels well otherwise, denies CP/SOB. Is active, compliant with HD and meds.      7/7/22 - Dr. Sun visit  Sees Dr. Gudino in clinic  Reports chest pain during dialysis yesterday, left sided  Believes the right amount of fluid was removed during   Symptoms recurred today when he woke up with chest pain with radiation to back, worse with movement  Reports having a bad mattress   INR 2.4  Stress test 5/21 without ischemia   EKG (similar 12/21, NSR,LAD, nonspec intraventricular block)    9/27/22  Pt saw Dr. Sun in July for CP. ECHO in July 2022 with normal bi V function, stable valves AV and MV, mild to mod MS, PASP 54 mmHg. BP and Hr well controlled today. BMI 27 - 212 lbs. He has more issues with ED lately, he wants to go to  [facility].     Patient feels  no PND, no palpitation, no dizziness, no syncope, no CNS symptoms.    Patient has dec exercise tolerance.     Patient is compliant with medications.    Results for orders placed during the hospital encounter of 07/07/22    Echo    Interpretation Summary  · The left ventricle is normal in size with moderate concentric hypertrophy and normal systolic function.  · Mild left atrial enlargement.  · The estimated ejection fraction is  60%.  · Grade II left ventricular diastolic dysfunction.  · There is abnormal septal wall motion consistent with post-operative status.  · Normal right ventricular size with normal right ventricular systolic function.  · Mild right atrial enlargement.  · There is a bileaflet tilting disc mechanical aortic valve present.  · The aortic valve mean gradient is 15 mmHg with a dimensionless index of 0.52.  · The mean diastolic gradient across the mitral valve is 11 mmHg at a heart rate of bpm.  · There is mild to moderate mitral stenosis.  · There is moderate pulmonary hypertension.  · Mild tricuspid regurgitation.  · Intermediate central venous pressure (8 mmHg).  · The estimated PA systolic pressure is 54 mmHg.  · The aortic root is mildly dilated.      RHC  Summary       The estimated blood loss was <50 mL.  Believe PA pressures are elevated in setting of increased filling pressures. Mean PA pressure is right around 34.  Recommend stricter volume management, review of echoes for the last 3 year demonstrates similar findings.  If this is a barrier to renal transplant, can consider adding PDE5, however do not think he will be able to tolerate given increased filling pressure and patient stating he drops pressures with HD.  Will likely need to discuss with abdominal transplant team.     The procedure log was documented by Documenter: Isis Sevilla and verified by Mariah Pierce MD.      Results for orders placed during the hospital encounter of 05/25/21    Nuclear Stress - Cardiology Interpreted    Interpretation Summary    Normal myocardial perfusion scan. There is no evidence of myocardial ischemia or infarction.    The gated perfusion images showed an ejection fraction of 65% at rest. The gated perfusion images showed an ejection fraction of 47% post stress.    The EKG portion of this study is negative for ischemia.    · Intermediate central venous pressure (8 mmHg).       Cath 2/17  1. Coronary angiography.       a.     Left main widely patent.      b.     LAD widely patent.      c.     Ramus widely patent and massive in size.      d.     Circumflex widely patent with a small OM 1 and OM 2 branch.      e.     Right coronary widely patent.  2. Hemodynamics:  Right heart catheterization.      a.     Pulmonary capillary wedge pressure 33 at end-expiration.      b.     Pulmonary artery pressure 78/42.      c.     Right ventricular pressure 70/21.      d.     Right atrial pressure 22 with a peak V-wave of 24.      e.     Cardiac output by thermodilution is between 6 and 7       L/minute, by Patricia 6.1 L/minute.    CONCLUSION  1. Normal coronary arteries.  2. Pulmonary hypertension.  3. Severe aortic insufficiency.      Past Medical History:   Diagnosis Date    Anemia     Aortic valve stenosis     s/p mechanical AVR    Atrial fibrillation     Atrial flutter     Cardiomyopathy     CHF (congestive heart failure)     Drug-induced erectile dysfunction     ESRD due to hypertension     HD M, W, F    ESRD on dialysis     GERD (gastroesophageal reflux disease)     Hepatitis B     Hyperlipidemia     Hypertension     Nightmares     Obesity     DANIEL (obstructive sleep apnea) 11/12/2019    Secondary hyperparathyroidism of renal origin     Supraventricular tachycardia     atrial tachycardia    Tachycardia     Valvular regurgitation     AI, TR       Past Surgical History:   Procedure Laterality Date    ASD REPAIR      age 8 [facility]    AV Graft Creation Left 03/2017    CARDIAC CATHETERIZATION      [facility]     CARDIAC VALVE SURGERY  02/08/2017    22 mm Medtronic AV, 28 mm TV Medtronic annuloplaty ring    COLONOSCOPY N/A 8/27/2019    Procedure: COLONOSCOPY;  Surgeon: Addie John MD;  Location: [facility] ENDO;  Service: Endoscopy;  Laterality: N/A;  dialysis pt *needs K*    COLONOSCOPY N/A 9/3/2020    Procedure: COLONOSCOPY-need INR;  Surgeon: Jemma Youngblood MD;  Location: [facility] ENDO;  Service: Endoscopy;  Laterality: N/A;     ESOPHAGOGASTRODUODENOSCOPY N/A 8/27/2019    Procedure: EGD (ESOPHAGOGASTRODUODENOSCOPY);  Surgeon: Addie John MD;  Location: [facility] ENDO;  Service: Endoscopy;  Laterality: N/A;    RADIOFREQUENCY ABLATION  03/13/2017    Atrial tachycardia    REMOVAL OF GRAFT Left 7/2/2020    Procedure: REMOVAL, GRAFT;  Surgeon: Sascha Sidhu MD;  Location: [facility] OR;  Service: Peripheral Vascular;  Laterality: Left;    RIGHT HEART CATHETERIZATION Right 8/12/2021    Procedure: INSERTION, CATHETER, RIGHT HEART;  Surgeon: Mariah Pierce MD;  Location: [facility] CATH LAB;  Service: Cardiology;  Laterality: Right;       Social History     Tobacco Use    Smoking status: Never    Smokeless tobacco: Never   Substance Use Topics    Alcohol use: No     Comment: quit in 2016; does have heavy drinking history; started drinking at age 13; was drinking a 12 pack over the weekend and two 24 ounces of beer a day during the week along with a pint of hard alcohol    Drug use: No       Family History   Problem Relation Age of Onset    Leukemia Mother     Cancer Mother     Heart attack Father         massive MI at age 67    No Known Problems Sister     No Known Problems Brother     No Known Problems Sister     No Known Problems Sister     Heart failure Paternal Grandmother     Diabetes Paternal Aunt     Hypertension Paternal Aunt     Leukemia Paternal Aunt         CLL    Suicide Paternal Uncle     Anesthesia problems Paternal Uncle     Diabetes Paternal Aunt     Stroke Paternal Aunt     Valvular heart disease Maternal Aunt     Kidney disease Neg Hx     Colon cancer Neg Hx        Patient's Medications   New Prescriptions    No medications on file   Previous Medications    ALBUTEROL (VENTOLIN HFA) 90 MCG/ACTUATION INHALER    Inhale 2 puffs into the lungs every 6 (six) hours as needed for Wheezing or Shortness of Breath. Rescue    AMLODIPINE (NORVASC) 10 MG TABLET    Take 1 tablet (10 mg total) by mouth once daily.    AMOXICILLIN-CLAVULANATE 500-125MG  (AUGMENTIN) 500-125 MG TAB    Take 1 tablet (500 mg total) by mouth once daily.    ASCORBIC ACID, VITAMIN C, (VITAMIN C) 500 MG TABLET    Take 1 tablet (500 mg total) by mouth 2 (two) times daily.    B COMPLEX VITAMINS TABLET    Take 1 tablet by mouth once daily.    CALCIUM ACETATE,PHOSPHAT BIND, (PHOSLO) 667 MG CAPSULE    take 3 capsule three times a day with meals and 1 capsule twice a day with a snack    DIALYSIS SOLUTIONS (PERITONEAL DIALYSIS SOLUTION IP)    Inject into the peritoneum every Mon, Wed, Fri. Lt FA Fistula, [facility] on M, W, F.    DILTIAZEM (CARDIZEM CD) 360 MG 24 HR CAPSULE    Take 1 capsule (360 mg total) by mouth once daily.    EPOETIN TORRIE (PROCRIT) 10,000 UNIT/ML INJECTION    Inject 1 mL (10,000 Units total) into the skin every Mon, Wed, Fri. To be given with HD    FERROUS SULFATE 324 MG (65 MG IRON) TBEC    Take 325 mg by mouth once daily.    FISH OIL-OMEGA-3 FATTY ACIDS 300-1,000 MG CAPSULE    Take 2 g by mouth once daily.    HYDRALAZINE (APRESOLINE) 100 MG TABLET    Take 1 tablet (100 mg total) by mouth 3 (three) times daily including dialysis.    HYDROCHLOROTHIAZIDE (MICROZIDE) 12.5 MG CAPSULE        HYDROXYZINE HCL (ATARAX) 25 MG TABLET    Take 1 tablet by mouth daily as needed for itching    HYDROXYZINE PAMOATE (VISTARIL) 25 MG CAP    take 1 capsule by mouth daily as needed for itching    LABETALOL (NORMODYNE) 200 MG TABLET    Take 1 tablet (200 mg total) by mouth 3 (three) times daily.    LABETALOL (NORMODYNE) 200 MG TABLET    Take 1 tablet by mouth three times daily    LABETALOL (NORMODYNE) 200 MG TABLET    Take 1 tablet (200 mg total) by mouth 3 (three) times daily.    LISINOPRIL (PRINIVIL,ZESTRIL) 40 MG TABLET    Take 1 tablet (40 mg total) by mouth once daily.    MULTIVITAMIN WITH MINERALS TABLET    Take 1 tablet by mouth once daily.    OMEGA-3 FATTY ACIDS-VITAMIN E (FISH OIL) 1,000 MG CAP    Take 1 capsule by mouth once daily.    PANTOPRAZOLE (PROTONIX) 40 MG TABLET     TAKE ONE TABLET BY MOUTH EVERY DAY    PANTOPRAZOLE (PROTONIX) 40 MG TABLET    Take 1 tablet (40 mg total) by mouth once daily.    PANTOPRAZOLE (PROTONIX) 40 MG TABLET    TAKE 1 TABLET BY MOUTH ONCE DAILY    ETHAN-RACQUEL RX 1- MG-MG-MCG TAB    TAKE ONE TABLET BY MOUTH EVERY DAY    TADALAFIL (CIALIS) 20 MG TAB    Take 1 tablet (20 mg total) by mouth every 24 hours as needed (erectile dysfunction).    TENOFOVIR DISOPROXIL FUMARATE (VIREAD) 300 MG TAB    TAKE 1 TABLET BY MOUTH ONCE WEEKLY    TRIAMCINOLONE ACETONIDE 0.1% (KENALOG) 0.1 % OINTMENT    Apply topically 2 (two) times daily.    VIT B CMPLX 3-FA-VIT C-BIOTIN 1- MG-MG-MCG (ETHAN-RACQUEL RX) 1- MG-MG-MCG TAB    Take 1 tablet by mouth once daily.    VITAMIN D (VITAMIN D3) 1000 UNITS TAB    Take 1 tablet by mouth daily    WARFARIN (COUMADIN) 1 MG TABLET        WARFARIN (COUMADIN) 7.5 MG TABLET    Take 2 tablets by mouth on Tuesday, Thursdays, and Saturday; and 1 tablet on all other days of the week or as directed by coumadin clinic.   Modified Medications    No medications on file   Discontinued Medications    No medications on file       Review of Systems   Constitutional:  Positive for malaise/fatigue.   HENT: Negative.     Eyes: Negative.    Respiratory:  Negative for shortness of breath.    Cardiovascular:  Negative for chest pain and palpitations.   Gastrointestinal: Negative.    Genitourinary:  Negative for hematuria.   Musculoskeletal:  Positive for joint pain.   Skin: Negative.    Neurological: Negative.    Endo/Heme/Allergies: Negative.    Psychiatric/Behavioral: Negative.     All 12 systems otherwise negative.    Wt Readings from Last 3 Encounters:   07/21/22 96.3 kg (212 lb 4.9 oz)   07/07/22 96.2 kg (212 lb 1.3 oz)   07/07/22 95.7 kg (211 lb)     Temp Readings from Last 3 Encounters:   04/28/22 97.5 °F (36.4 °C) (Oral)   02/03/22 98.6 °F (37 °C) (Oral)   01/13/22 98.3 °F (36.8 °C)     BP Readings from Last 3 Encounters:   07/21/22 122/76    07/07/22 138/80   07/07/22 (!) 140/80     Pulse Readings from Last 3 Encounters:   07/21/22 86   07/07/22 84   07/07/22 87       There were no vitals taken for this visit.     Objective:   Physical Exam  Vitals and nursing note reviewed.   Constitutional:       General: He is not in acute distress.     Appearance: He is well-developed. He is not diaphoretic.   HENT:      Head: Normocephalic and atraumatic.      Nose: Nose normal.   Eyes:      General: No scleral icterus.     Conjunctiva/sclera: Conjunctivae normal.   Neck:      Thyroid: No thyromegaly.      Vascular: No JVD.   Cardiovascular:      Rate and Rhythm: Normal rate and regular rhythm.      Heart sounds: S1 normal and S2 normal. Murmur heard.   Midsystolic murmur is present at the upper right sternal border.     No friction rub. No gallop. No S3 or S4 sounds.      Comments: Crisp S2  Pulmonary:      Effort: Pulmonary effort is normal. No respiratory distress.      Breath sounds: Normal breath sounds. No stridor. No wheezing or rales.   Chest:      Chest wall: No tenderness.   Abdominal:      General: Bowel sounds are normal. There is no distension.      Palpations: Abdomen is soft. There is no mass.      Tenderness: There is no abdominal tenderness. There is no rebound.   Genitourinary:     Comments: Deferred  Musculoskeletal:         General: No tenderness or deformity. Normal range of motion.      Cervical back: Normal range of motion and neck supple.   Lymphadenopathy:      Cervical: No cervical adenopathy.   Skin:     General: Skin is warm and dry.      Coloration: Skin is not pale.      Findings: No erythema or rash.   Neurological:      Mental Status: He is alert and oriented to person, place, and time.      Motor: No abnormal muscle tone.      Coordination: Coordination normal.   Psychiatric:         Behavior: Behavior normal.         Thought Content: Thought content normal.         Judgment: Judgment normal.       Lab Results   Component Value  Date     07/21/2022    K 4.2 07/21/2022    CL 97 07/21/2022    CO2 25 07/21/2022    BUN 51 (H) 07/21/2022    CREATININE 12.1 (H) 07/21/2022    GLU 82 07/21/2022    HGBA1C 4.8 09/20/2019    MG 2.3 08/12/2021    AST 11 07/21/2022    ALT 12 07/21/2022    ALBUMIN 3.4 (L) 07/21/2022    PROT 8.5 (H) 07/21/2022    BILITOT 0.5 07/21/2022    WBC 5.68 07/21/2022    HGB 12.1 (L) 07/21/2022    HCT 37.2 (L) 07/21/2022    HCT 25 (L) 02/08/2017     (H) 07/21/2022     07/21/2022    INR 2.7 09/08/2022    INR 1.3 (H) 07/21/2022    CHOL 248 (H) 11/05/2020    HDL 42 11/05/2020    LDLCALC 164.8 (H) 11/05/2020    TRIG 206 (H) 11/05/2020     (H) 07/01/2021     Assessment:      1. Chronic diastolic heart failure    2. Long term (current) use of anticoagulants    3. H/O aortic valve replacement    4. Atrial fibrillation, unspecified type    5. Anticoagulated    6. History of mechanical aortic valve replacement    7. Chest pain, unspecified type    8. Essential hypertension    9. Adult congenital heart disease    10. PVC's (premature ventricular contractions)    11. History of radiofrequency ablation for complex right atrial arrhythmia    12. Pulmonary hypertension    13. Chronic pulmonary heart disease    14. End stage renal disease        Plan:   1. Chronic Diastolic HF  - recovered EF  - cont low salt diet  - pt euvolemic     2. S/p mech AVR, with mild to moderate MS  - cont coumadin  - f/u with coumadin clinic here  - f/u with CTS - in [place] Gast[place]  -  ECHO in July 2022 with normal bi V function, stable valves AV and MV, mild to mod MS, PASP 54 mmHg.     3. ESRD - dry weight improved  - cont HD  - anemia with PRBCs and EPO    4. HTN   - cont meds  - adjusted BP meds - labetelol TID    5. PVCs, SVT s/p RFA   - cont BB and CCB     6. HLD  - cont statin    7. ED  - cont Cialis  - monitor BP, avoid nitrates     8. Pulm HTN/ DANIEL  - PASP 56 mmHg.   -f/u CHF/pulm HTN clinic - may start PDE5    9. Chronic hep B   -  cont tx per hepatology  - further workup pending for cirrhosis - EGD    10 . Pre-OP CV evaluation prior to renal transplant  - cont transplant workup  - Elevated CV risk for renal transplant but discuss with pulm HTN clinic    11. ED  - cont tx PRN  - f/u urology     Thank you for allowing me to participate in this patient's care. Please do not hesitate to contact me with any questions or concerns. Consult note has been forwarded to the referral physician.

## 2022-10-04 ENCOUNTER — ANTI-COAG VISIT (OUTPATIENT)
Dept: CARDIOLOGY | Facility: CLINIC | Age: 52
End: 2022-10-04
Payer: MEDICARE

## 2022-10-04 DIAGNOSIS — Z95.2 H/O MECHANICAL AORTIC VALVE REPLACEMENT: ICD-10-CM

## 2022-10-04 DIAGNOSIS — Z79.01 LONG TERM (CURRENT) USE OF ANTICOAGULANTS: Primary | ICD-10-CM

## 2022-10-04 DIAGNOSIS — I48.91 ATRIAL FIBRILLATION, UNSPECIFIED TYPE: ICD-10-CM

## 2022-10-04 LAB — INR PPP: 5.5 (ref 2–3)

## 2022-10-04 PROCEDURE — 93793 PR ANTICOAGULANT MGMT FOR PT TAKING WARFARIN: ICD-10-PCS | Mod: ,,,

## 2022-10-04 PROCEDURE — 93793 ANTICOAG MGMT PT WARFARIN: CPT | Mod: ,,,

## 2022-10-04 PROCEDURE — 85610 PROTHROMBIN TIME: CPT | Mod: PBBFAC

## 2022-10-04 NOTE — PROGRESS NOTES
Patient returned to Coumadin Clinic and has been given dosing/instructions and calendar.  Patient has been advised to contact Coumadin Clinic when/if scheduled for any procedure.  Patient verbalizes understanding.

## 2022-10-04 NOTE — PROGRESS NOTES
Patient's INR is supra-therapeutic at 5.5.  Patient reports inadvertently took extra dose on 9/30/22.   Advised patient of increased risk of bleeding; signs/symptoms of bleeding and need to go to ED if experiences any.  Patient reports not having any signs/symptoms of bleeding.  Patient has been scheduled for liver biopsy by Sandy Lane NP Gastroenterology.  Patient states he is unaware of procedure being scheduled and will have to cancel.  States he will contact Sandy Lane NP for further consultation.  Sent to PharmD for dosing/instructions.

## 2022-10-04 NOTE — PROGRESS NOTES
INR not at goal. Medications, chart, and patient findings reviewed. See calendar for adjustments to dose and follow up plan.  Hold today and lower dose tomorrow to 3.75 mg, then resume.  Repeat INR in 1 week - BUT he will need to call us with a procedure date when it is rescheduled.  Any bleeding, report to the the ER.

## 2022-10-04 NOTE — PROGRESS NOTES
Patient states that he was going to Gastro to see if he could speak with Sandy Lane NP. I asked patient to be sure to come back to Coumadin Clinic before leaving the clinic so that I could go over his dosing/instructions and give him his dosing calendar.  Patient has not returned.  I've tried calling patient three times.  I left voice message with dosing and message to call our office back at [phone] or [phone].

## 2022-10-11 ENCOUNTER — ANTI-COAG VISIT (OUTPATIENT)
Dept: CARDIOLOGY | Facility: CLINIC | Age: 52
End: 2022-10-11
Payer: MEDICARE

## 2022-10-11 DIAGNOSIS — D68.9 COAGULATION DEFECT: ICD-10-CM

## 2022-10-11 DIAGNOSIS — I48.91 ATRIAL FIBRILLATION, UNSPECIFIED TYPE: ICD-10-CM

## 2022-10-11 DIAGNOSIS — K74.60 HEPATIC CIRRHOSIS, UNSPECIFIED HEPATIC CIRRHOSIS TYPE, UNSPECIFIED WHETHER ASCITES PRESENT: Primary | ICD-10-CM

## 2022-10-11 DIAGNOSIS — Z79.01 LONG TERM (CURRENT) USE OF ANTICOAGULANTS: Primary | ICD-10-CM

## 2022-10-11 DIAGNOSIS — Z95.2 H/O MECHANICAL AORTIC VALVE REPLACEMENT: ICD-10-CM

## 2022-10-11 LAB — INR PPP: 2 (ref 2–3)

## 2022-10-11 PROCEDURE — 93793 ANTICOAG MGMT PT WARFARIN: CPT | Mod: ,,,

## 2022-10-11 PROCEDURE — 93793 PR ANTICOAGULANT MGMT FOR PT TAKING WARFARIN: ICD-10-PCS | Mod: ,,,

## 2022-10-11 PROCEDURE — 85610 PROTHROMBIN TIME: CPT | Mod: PBBFAC

## 2022-10-11 NOTE — PROGRESS NOTES
Patient's INR is therapeutic at 2.0.  Patient reports followed previous instructions.  Patient reports he will not have procedure that is scheduled on 10/12/22.  Patient reports no changes.  Instructions given: continue warfarin 11.25 mg on Tuesdays; and 7.5 mg all other days.  Recheck in two weeks on 10/25/22.  Calendar reviewed with patient.  Patient verbalizes understanding.

## 2022-10-12 ENCOUNTER — TELEPHONE (OUTPATIENT)
Dept: RADIOLOGY | Facility: HOSPITAL | Age: 52
End: 2022-10-12
Payer: MEDICARE

## 2022-10-14 ENCOUNTER — TELEPHONE (OUTPATIENT)
Dept: RADIOLOGY | Facility: HOSPITAL | Age: 52
End: 2022-10-14
Payer: MEDICARE

## 2022-10-19 ENCOUNTER — TELEPHONE (OUTPATIENT)
Dept: TRANSPLANT | Facility: CLINIC | Age: 52
End: 2022-10-19
Payer: MEDICARE

## 2022-10-19 NOTE — TELEPHONE ENCOUNTER
Spoke to patient who states he does not want to go through with Liver biopsy because he understands that he is too high risk with the condition of his heart to proceed with Transplant. He was told by his Cardiologist that he has pulmonary hypertension and he is likely not a candidate. Patient understands that he will be presented to committee for probable closure and will receive letter stating the reasons we discussed over the phone. Patient was realistic about not being a candidate for kidney transplant.

## 2022-10-19 NOTE — TELEPHONE ENCOUNTER
----- Message from Tomas Rowland MD sent at 10/19/2022  1:14 PM CDT -----  Contact: [phone]  Please see below, present case to  close this chart. Notify patient.   ----- Message -----  From: VERENA Sutton  Sent: 10/19/2022   8:18 AM CDT  To: Tomas Rowland MD    This is just an FYI.   Pt has refused the liver biopsy      ----- Message -----  From: Aaliyah Olsen MA  Sent: 10/14/2022   1:53 PM CDT  To: VERENA Sutton      ----- Message -----  From: Klaudia Franco  Sent: 10/14/2022   1:51 PM CDT  To: Domingo MANUEL Staff    Biopsy was ordered for the pt and someone did attempt to call him to schedule. Pt called back and stated he does not want to schedule the biopsy and wants everyone to stop bothering him re scheduling. Wanted to make provider aware incase she would like to remove the order so pt will not continue to be called.

## 2022-10-19 NOTE — TELEPHONE ENCOUNTER
Left message for patient to discuss kidney evaluation. Patient also reminded to call Hepatology to reschedule his liver biopsy. We will need this to present him to committee.

## 2022-10-20 DIAGNOSIS — I10 ESSENTIAL HYPERTENSION: ICD-10-CM

## 2022-10-20 DIAGNOSIS — I49.3 PVC'S (PREMATURE VENTRICULAR CONTRACTIONS): ICD-10-CM

## 2022-10-20 RX ORDER — DILTIAZEM HYDROCHLORIDE 360 MG/1
360 CAPSULE, EXTENDED RELEASE ORAL DAILY
Qty: 90 CAPSULE | Refills: 1 | Status: SHIPPED | OUTPATIENT
Start: 2022-10-20 | End: 2022-12-13 | Stop reason: SDUPTHER

## 2022-10-20 RX ORDER — VIT B COMP NO.3/FOLIC/C/BIOTIN 1 MG-60 MG
1 TABLET ORAL DAILY
Qty: 90 TABLET | Refills: 2 | Status: SHIPPED | OUTPATIENT
Start: 2022-10-20 | End: 2023-12-12 | Stop reason: SDUPTHER

## 2022-10-26 ENCOUNTER — TELEPHONE (OUTPATIENT)
Dept: TRANSPLANT | Facility: CLINIC | Age: 52
End: 2022-10-26
Payer: MEDICARE

## 2022-10-26 NOTE — TELEPHONE ENCOUNTER
" contacted patient's dialysis clinic to complete adherence check.  spoke with dialysis unit .     Dialysis unit  reports the following:     AMA's past 3 months: None     Missed treatments past 3 months: None    Transportation concerns: None     Care giving concerns: None    Mental health concerns: Dialysis social worker reported that patient "sometimes becomes irritable". Dialysis social worker reported that this has not happened "for a while" and denied any other known mental health concerns.      remains available,   Harriet Haynes LMSW   "

## 2022-10-28 ENCOUNTER — TELEPHONE (OUTPATIENT)
Dept: TRANSPLANT | Facility: CLINIC | Age: 52
End: 2022-10-28
Payer: MEDICARE

## 2022-10-28 ENCOUNTER — COMMITTEE REVIEW (OUTPATIENT)
Dept: TRANSPLANT | Facility: CLINIC | Age: 52
End: 2022-10-28
Payer: MEDICARE

## 2022-10-28 NOTE — LETTER
October 28, 2022    Isidro White  [address]  [address]  [place] [address]    Dear Isidro White:  MRN: [id number]    It is the duty of the [facility] to determine which patients are candidates for a transplant. For this reason, our committee has the difficult task of evaluating patients to determine which ones have the greatest chance of having a successful transplant. We are aware of the magnitude of this responsibility, and we approach it with reverence and humility.    It is with regret I inform you that you are not approved as a transplant candidate due to heart disease, pulmonary hypertension, hypoxemia and liver disease of unknown cause.  Based on this review, we have determined that you are not a candidate for a transplant at [facility].      The selection committee carefully considers each patient's transplant candidacy and determines whether it is safe to proceed with transplantation on a case-by-case basis using established selection criteria.  At present, the risk of proceeding with an elective transplant surgery has become too high.                                                                               Although the selection committee believes you are not a suitable transplant candidate, you have the option to be evaluated at other transplant centers who may have different selection criteria.  You may request your [facility] records be sent to any center of your choice by contacting our Medical Records Department at [phone].                                                                               Attached is a letter from the [employer] for Organ Sharing (UNOS).  It describes the services and information offered to patients by UNOS and the Organ Procurement and Transplant Network.    The [facility] sincerely wishes you the best and remains available to answer any questions.  Please do not hesitate to contact our  pre-transplant office if we can assist you in any other way.                                                                               Sincerely,      Coco Ibrahim MD  Medical Director, Kidney & Kidney/Pancreas Transplantation    CC:  Dr. Sayda WARREN    Encl: OS Letter               The Organ Procurement and Transplantation Network   Toll-free patient services line: Your resource for organ transplant information     If you have a question regarding your own medical care, you always should call your transplant hospital first. However, for general organ transplant-related information, you can call the Organ Procurement and Transplantation Network (OPTN) toll-free patient services line at [phone].     Anyone, including potential transplant candidates, candidates, recipients, family members, friends, living donors, and donor family members, can call this number to:     Talk about organ donation, living donation, the transplant process, the donation process, and transplant policies.   Get a free patient information kit with helpful booklets, waiting list and transplant information, and a list of all transplant hospitals.   Ask questions about the OPTN website (https://optn.transplant.hrsa.gov/), the [employer] for Organ Sharings (UNOS) website (https://unos.org/), or the UNOS website for living donors and transplant recipients. (https://www.transplantliving.org/).   Learn how the OPTN can help you.   Talk about any concerns that you may have with a transplant hospital.     The nations transplant system, the OPTN, is managed under federal contract by the [employer] Organ Sharing (UNOS), which is a non-profit charitable organization. The OPTN helps create and define organ sharing policies that make the best use of donated organs. This process continuously evaluating new advances and discoveries so policies can be adapted to best serve patients waiting for  transplants. To do so, the OPTN works closely with transplant professionals, transplant patients, transplant candidates, donor families, living donors, and the public. All transplant programs and organ procurement organizations throughout the country are OPTN members and are obligated to follow the policies the OPTN creates for allocating organs.     The OPTN also is responsible for:   Providing educational material for patients, the public, and professionals.   Raising awareness of the need for donated organs and tissue.   Coordinating organ procurement, matching, and placement.   Collecting information about every organ transplant and donation that occurs in the [place].     Remember, you should contact your transplant hospital directly if you have questions or concerns about your own medical care including medical records, work-up progress, and test results.     We are not your transplant hospital, and our staff will not be able to answer questions about your case, so please keep your transplant hospitals phone number handy.   However, while you research your transplant needs and learn as much as you can about transplantation and donation, we welcome your call to our toll-free patient services line at [phone].

## 2022-10-28 NOTE — COMMITTEE REVIEW
Native Organ Dx: Hypertensive Nephrosclerosis      Not approved for LRD/CAD transplant due to heart disease, pulmonary hypertension, hypoxemia, liver disease of unknown cause.  Patient is not a candidate for re-referral.       Note written by PK Gilman, RN    ===============================================    I was present at the meeting and attest to the decision of the committee.    Tomas Rowland  10/28/2022

## 2022-10-28 NOTE — TELEPHONE ENCOUNTER
----- Message from Anson Foreman sent at 10/28/2022  1:18 PM CDT -----  Regarding: return call  Patient returning call to coordinator regarding committee's decision.  Requesting a call back.      Call: [phone] (Mobile

## 2022-11-10 ENCOUNTER — ANTI-COAG VISIT (OUTPATIENT)
Dept: CARDIOLOGY | Facility: CLINIC | Age: 52
End: 2022-11-10
Payer: MEDICARE

## 2022-11-10 DIAGNOSIS — Z79.01 LONG TERM (CURRENT) USE OF ANTICOAGULANTS: Primary | ICD-10-CM

## 2022-11-10 DIAGNOSIS — Z79.01 ANTICOAGULATED: ICD-10-CM

## 2022-11-10 DIAGNOSIS — Z95.2 HISTORY OF MECHANICAL AORTIC VALVE REPLACEMENT: ICD-10-CM

## 2022-11-10 DIAGNOSIS — I48.91 ATRIAL FIBRILLATION, UNSPECIFIED TYPE: ICD-10-CM

## 2022-11-10 LAB — INR PPP: 2.4 (ref 2–3)

## 2022-11-10 PROCEDURE — 93793 ANTICOAG MGMT PT WARFARIN: CPT | Mod: ,,,

## 2022-11-10 PROCEDURE — 93793 PR ANTICOAGULANT MGMT FOR PT TAKING WARFARIN: ICD-10-PCS | Mod: ,,,

## 2022-11-10 PROCEDURE — 85610 PROTHROMBIN TIME: CPT | Mod: PBBFAC

## 2022-11-10 NOTE — PROGRESS NOTES
Patient's INR is therapeutic at 2.4.  Patient reports no changes.  Instructions given: Continue warfarin 11.25 mg on Tuesdays; and 7.5 mg all other days.  Recheck in four weeks on 12/8/22. Calendar reviewed with patient.  Patient verbalizes understanding.

## 2022-11-18 ENCOUNTER — PATIENT OUTREACH (OUTPATIENT)
Dept: ADMINISTRATIVE | Facility: HOSPITAL | Age: 52
End: 2022-11-18
Payer: MEDICARE

## 2022-11-29 DIAGNOSIS — I10 ESSENTIAL HYPERTENSION: Primary | ICD-10-CM

## 2022-11-30 DIAGNOSIS — B18.1 CHRONIC VIRAL HEPATITIS B WITHOUT DELTA AGENT AND WITHOUT COMA: ICD-10-CM

## 2022-11-30 RX ORDER — LISINOPRIL 40 MG/1
40 TABLET ORAL DAILY
Qty: 90 TABLET | Refills: 1 | Status: SHIPPED | OUTPATIENT
Start: 2022-11-30 | End: 2023-12-12

## 2022-11-30 RX ORDER — TENOFOVIR DISOPROXIL FUMARATE 300 MG/1
300 TABLET, FILM COATED ORAL WEEKLY
Qty: 4 TABLET | Refills: 3 | OUTPATIENT
Start: 2022-11-30 | End: 2023-01-26 | Stop reason: SDUPTHER

## 2022-11-30 RX ORDER — LABETALOL 200 MG/1
200 TABLET, FILM COATED ORAL 3 TIMES DAILY
Qty: 90 TABLET | Refills: 3 | OUTPATIENT
Start: 2022-11-30 | End: 2023-06-19 | Stop reason: SDUPTHER

## 2022-11-30 RX ORDER — TENOFOVIR DISOPROXIL FUMARATE 300 MG/1
300 TABLET, FILM COATED ORAL WEEKLY
Qty: 30 TABLET | Refills: 2 | Status: CANCELLED | OUTPATIENT
Start: 2022-11-30

## 2022-12-08 ENCOUNTER — ANTI-COAG VISIT (OUTPATIENT)
Dept: CARDIOLOGY | Facility: CLINIC | Age: 52
End: 2022-12-08
Payer: MEDICARE

## 2022-12-08 DIAGNOSIS — Z79.01 ANTICOAGULATED: ICD-10-CM

## 2022-12-08 DIAGNOSIS — Z95.2 HISTORY OF MECHANICAL AORTIC VALVE REPLACEMENT: ICD-10-CM

## 2022-12-08 DIAGNOSIS — Z79.01 LONG TERM (CURRENT) USE OF ANTICOAGULANTS: Primary | ICD-10-CM

## 2022-12-08 DIAGNOSIS — I48.91 ATRIAL FIBRILLATION, UNSPECIFIED TYPE: ICD-10-CM

## 2022-12-08 LAB — INR PPP: 1.5 (ref 2–3)

## 2022-12-08 PROCEDURE — 85610 PROTHROMBIN TIME: CPT | Mod: PBBFAC

## 2022-12-08 PROCEDURE — 93793 ANTICOAG MGMT PT WARFARIN: CPT | Mod: ,,,

## 2022-12-08 PROCEDURE — 93793 PR ANTICOAGULANT MGMT FOR PT TAKING WARFARIN: ICD-10-PCS | Mod: ,,,

## 2022-12-08 NOTE — PROGRESS NOTES
Patient's INR is sub-therapeutic at 1.5.  Patient reports missed dose on 12/5/22 and and took lower dose on 12/6/22.  Advised patient importance of taking medication as instructed and not to self dose.  Advised of increased risk of clotting; signs/symptoms of clotting and need to go to ED if experiences any.  Patient reports not having signs/symptoms of clotting. Sent to PharmD for dosing/instructions.

## 2022-12-08 NOTE — PROGRESS NOTES
INR not at goal. Medications, chart, and patient findings reviewed. See calendar for adjustments to dose and follow up plan.  Boost dose and resume.  Avoid self dosing.

## 2022-12-13 ENCOUNTER — TELEPHONE (OUTPATIENT)
Dept: CARDIOLOGY | Facility: CLINIC | Age: 52
End: 2022-12-13
Payer: MEDICARE

## 2022-12-13 DIAGNOSIS — Z79.01 LONG TERM (CURRENT) USE OF ANTICOAGULANTS: ICD-10-CM

## 2022-12-13 DIAGNOSIS — I49.3 PVC'S (PREMATURE VENTRICULAR CONTRACTIONS): ICD-10-CM

## 2022-12-13 DIAGNOSIS — I10 ESSENTIAL HYPERTENSION: ICD-10-CM

## 2022-12-13 RX ORDER — DILTIAZEM HYDROCHLORIDE 360 MG/1
360 CAPSULE, EXTENDED RELEASE ORAL DAILY
Qty: 90 CAPSULE | Refills: 1 | Status: SHIPPED | OUTPATIENT
Start: 2022-12-13 | End: 2022-12-13

## 2022-12-13 RX ORDER — WARFARIN 7.5 MG/1
TABLET ORAL
Qty: 45 TABLET | Refills: 6 | Status: SHIPPED | OUTPATIENT
Start: 2022-12-13 | End: 2023-11-30 | Stop reason: SDUPTHER

## 2022-12-13 RX ORDER — DILTIAZEM HYDROCHLORIDE 420 MG/1
420 CAPSULE, EXTENDED RELEASE ORAL DAILY
Qty: 90 CAPSULE | Refills: 1 | Status: SHIPPED | OUTPATIENT
Start: 2022-12-13 | End: 2023-05-22 | Stop reason: SDUPTHER

## 2022-12-13 NOTE — TELEPHONE ENCOUNTER
LVM for pt to deb me back in regards to      OK I am increasing his diltiazem to 420 mg. He needs to discuss with me if his heart rates remain elevated thanks

## 2022-12-13 NOTE — TELEPHONE ENCOUNTER
Spoke with pt in regards to message below       Please call pt back at [phone]. Pt states he need verification on medication for he dialysis center. His heart rate has been going up and the are telling him to take 2 pills to keep him from going into cardiac arrest.      Pt stated during Dialysis is heart increases rapidly so the people at dialysis are advising the pt to take his Diltiazem 2x daily, instead of 1x daily as prescribed by you.    Pt want to know what he should do and also needs an refill on the Diltiazem.      Please advise.

## 2022-12-22 ENCOUNTER — ANTI-COAG VISIT (OUTPATIENT)
Dept: CARDIOLOGY | Facility: CLINIC | Age: 52
End: 2022-12-22
Payer: MEDICARE

## 2022-12-22 DIAGNOSIS — Z95.2 HISTORY OF MECHANICAL AORTIC VALVE REPLACEMENT: ICD-10-CM

## 2022-12-22 DIAGNOSIS — Z79.01 ANTICOAGULATED: ICD-10-CM

## 2022-12-22 DIAGNOSIS — Z79.01 LONG TERM (CURRENT) USE OF ANTICOAGULANTS: Primary | ICD-10-CM

## 2022-12-22 DIAGNOSIS — I48.91 ATRIAL FIBRILLATION, UNSPECIFIED TYPE: ICD-10-CM

## 2022-12-22 LAB — INR PPP: 2.5 (ref 2–3)

## 2022-12-22 PROCEDURE — 85610 PROTHROMBIN TIME: CPT | Mod: PBBFAC

## 2022-12-22 PROCEDURE — 93793 ANTICOAG MGMT PT WARFARIN: CPT | Mod: ,,,

## 2022-12-22 PROCEDURE — 93793 PR ANTICOAGULANT MGMT FOR PT TAKING WARFARIN: ICD-10-PCS | Mod: ,,,

## 2023-01-05 ENCOUNTER — ANTI-COAG VISIT (OUTPATIENT)
Dept: CARDIOLOGY | Facility: CLINIC | Age: 53
End: 2023-01-05
Payer: MEDICARE

## 2023-01-05 DIAGNOSIS — Z79.01 ANTICOAGULATED: ICD-10-CM

## 2023-01-05 DIAGNOSIS — Z95.2 HISTORY OF MECHANICAL AORTIC VALVE REPLACEMENT: ICD-10-CM

## 2023-01-05 DIAGNOSIS — Z79.01 LONG TERM (CURRENT) USE OF ANTICOAGULANTS: Primary | ICD-10-CM

## 2023-01-05 LAB — INR PPP: 1.4 (ref 2–3)

## 2023-01-05 PROCEDURE — 93793 PR ANTICOAGULANT MGMT FOR PT TAKING WARFARIN: ICD-10-PCS | Mod: ,,,

## 2023-01-05 PROCEDURE — 85610 PROTHROMBIN TIME: CPT | Mod: PBBFAC

## 2023-01-05 PROCEDURE — 93793 ANTICOAG MGMT PT WARFARIN: CPT | Mod: ,,,

## 2023-01-05 NOTE — PROGRESS NOTES
INR not at goal. Medications, chart, and patient findings reviewed. See calendar for adjustments to dose and follow up plan.   Boost dose x 2 repeat in 1 week.    
Patient's INR is sub-therapeutic at 1.4.  Patient reports followed previous instructions.   Patient reports has been eating foods containing vitamin K since Is[place] holidays.  Re-educated on Coumadin Diet and need to keep consistent.   Advised of increased risk of clotting; signs/symptoms of clotting and need to go to ED if experiences any.  Patient reports not having any signs/symptoms of clotting.  Sent to PharmD for dosing/instructions.     
Not applicable

## 2023-01-12 ENCOUNTER — ANTI-COAG VISIT (OUTPATIENT)
Dept: CARDIOLOGY | Facility: CLINIC | Age: 53
End: 2023-01-12
Payer: MEDICARE

## 2023-01-12 DIAGNOSIS — Z79.01 ANTICOAGULATED: ICD-10-CM

## 2023-01-12 DIAGNOSIS — Z95.2 HISTORY OF MECHANICAL AORTIC VALVE REPLACEMENT: ICD-10-CM

## 2023-01-12 DIAGNOSIS — Z79.01 LONG TERM (CURRENT) USE OF ANTICOAGULANTS: Primary | ICD-10-CM

## 2023-01-12 LAB — INR PPP: 2.2 (ref 2–3)

## 2023-01-12 PROCEDURE — 93793 ANTICOAG MGMT PT WARFARIN: CPT | Mod: ,,,

## 2023-01-12 PROCEDURE — 93793 PR ANTICOAGULANT MGMT FOR PT TAKING WARFARIN: ICD-10-PCS | Mod: ,,,

## 2023-01-12 PROCEDURE — 85610 PROTHROMBIN TIME: CPT | Mod: PBBFAC

## 2023-01-12 NOTE — PROGRESS NOTES
Patient's INR is therapeutic at 2.2.  Patient reports followed previous instructions.  Reports no changes.  Instructions given: continue warfarin 11.25 mg on Tuesdays; and 7.5 mg all other days. Recheck in two weeks on 1/26/23.  Calendar reviewed with patient.  Patient verbalizes understanding.

## 2023-01-17 ENCOUNTER — IMMUNIZATION (OUTPATIENT)
Dept: PRIMARY CARE CLINIC | Facility: CLINIC | Age: 53
End: 2023-01-17
Payer: MEDICARE

## 2023-01-17 DIAGNOSIS — Z23 NEED FOR VACCINATION: Primary | ICD-10-CM

## 2023-01-17 PROCEDURE — 91313 COVID-19, MRNA, LNP-S, BIVALENT BOOSTER, PF, 50 MCG/0.5 ML: CPT | Mod: PBBFAC | Performed by: FAMILY MEDICINE

## 2023-01-24 ENCOUNTER — ANTI-COAG VISIT (OUTPATIENT)
Dept: CARDIOLOGY | Facility: CLINIC | Age: 53
End: 2023-01-24
Payer: MEDICARE

## 2023-01-24 ENCOUNTER — HOSPITAL ENCOUNTER (OUTPATIENT)
Dept: RADIOLOGY | Facility: HOSPITAL | Age: 53
Discharge: HOME | End: 2023-01-24
Attending: NURSE PRACTITIONER
Payer: MEDICARE

## 2023-01-24 DIAGNOSIS — K74.60 CHRONIC HEPATITIS B WITH CIRRHOSIS: ICD-10-CM

## 2023-01-24 DIAGNOSIS — Z95.2 HISTORY OF MECHANICAL AORTIC VALVE REPLACEMENT: ICD-10-CM

## 2023-01-24 DIAGNOSIS — B18.1 CHRONIC HEPATITIS B WITH CIRRHOSIS: ICD-10-CM

## 2023-01-24 DIAGNOSIS — Z79.01 ANTICOAGULATED: Primary | ICD-10-CM

## 2023-01-24 DIAGNOSIS — K74.60 HEPATIC CIRRHOSIS, UNSPECIFIED HEPATIC CIRRHOSIS TYPE, UNSPECIFIED WHETHER ASCITES PRESENT: ICD-10-CM

## 2023-01-24 PROCEDURE — 76705 ECHO EXAM OF ABDOMEN: CPT | Mod: 26,,, | Performed by: RADIOLOGY

## 2023-01-24 PROCEDURE — 76705 US ABDOMEN LIMITED: ICD-10-PCS | Mod: 26,,, | Performed by: RADIOLOGY

## 2023-01-24 PROCEDURE — 76705 ECHO EXAM OF ABDOMEN: CPT | Mod: TC

## 2023-01-24 PROCEDURE — 93793 ANTICOAG MGMT PT WARFARIN: CPT | Mod: ,,,

## 2023-01-24 PROCEDURE — 93793 PR ANTICOAGULANT MGMT FOR PT TAKING WARFARIN: ICD-10-PCS | Mod: ,,,

## 2023-01-24 NOTE — PROGRESS NOTES
INR not at goal. Medications, chart, and patient findings reviewed. See calendar for adjustments to dose and follow up plan.  Lab collected - not order by CC - 1.2.  Patient contacted.  Denies missed doses.  Patient reports recent intake in Vit K , greens.  We will boost dose x 2 and repeat INR with appointment already scheduled in our CC on Thursday this week.

## 2023-01-26 ENCOUNTER — ANTI-COAG VISIT (OUTPATIENT)
Dept: CARDIOLOGY | Facility: CLINIC | Age: 53
End: 2023-01-26
Payer: MEDICARE

## 2023-01-26 ENCOUNTER — OFFICE VISIT (OUTPATIENT)
Dept: HEPATOLOGY | Facility: CLINIC | Age: 53
End: 2023-01-26
Payer: MEDICARE

## 2023-01-26 VITALS
HEIGHT: 74 IN | BODY MASS INDEX: 27.39 KG/M2 | WEIGHT: 213.38 LBS | DIASTOLIC BLOOD PRESSURE: 70 MMHG | SYSTOLIC BLOOD PRESSURE: 128 MMHG | HEART RATE: 67 BPM

## 2023-01-26 DIAGNOSIS — B18.1 CHRONIC HEPATITIS B WITH CIRRHOSIS: ICD-10-CM

## 2023-01-26 DIAGNOSIS — Z79.01 ANTICOAGULATED: ICD-10-CM

## 2023-01-26 DIAGNOSIS — K74.60 HEPATIC CIRRHOSIS, UNSPECIFIED HEPATIC CIRRHOSIS TYPE, UNSPECIFIED WHETHER ASCITES PRESENT: Primary | ICD-10-CM

## 2023-01-26 DIAGNOSIS — Z79.01 CURRENT USE OF LONG TERM ANTICOAGULATION: ICD-10-CM

## 2023-01-26 DIAGNOSIS — B18.1 CHRONIC VIRAL HEPATITIS B WITHOUT DELTA AGENT AND WITHOUT COMA: ICD-10-CM

## 2023-01-26 DIAGNOSIS — K74.60 CHRONIC HEPATITIS B WITH CIRRHOSIS: ICD-10-CM

## 2023-01-26 DIAGNOSIS — Z79.01 LONG TERM (CURRENT) USE OF ANTICOAGULANTS: Primary | ICD-10-CM

## 2023-01-26 DIAGNOSIS — Z95.2 HISTORY OF MECHANICAL AORTIC VALVE REPLACEMENT: ICD-10-CM

## 2023-01-26 LAB — INR PPP: 2.2 (ref 2–3)

## 2023-01-26 PROCEDURE — 93793 ANTICOAG MGMT PT WARFARIN: CPT | Mod: ,,,

## 2023-01-26 PROCEDURE — 85610 PROTHROMBIN TIME: CPT | Mod: PBBFAC

## 2023-01-26 PROCEDURE — 99215 OFFICE O/P EST HI 40 MIN: CPT | Mod: PBBFAC | Performed by: NURSE PRACTITIONER

## 2023-01-26 PROCEDURE — 93793 PR ANTICOAGULANT MGMT FOR PT TAKING WARFARIN: ICD-10-PCS | Mod: ,,,

## 2023-01-26 PROCEDURE — 99214 PR OFFICE/OUTPT VISIT, EST, LEVL IV, 30-39 MIN: ICD-10-PCS | Mod: S$PBB,,, | Performed by: NURSE PRACTITIONER

## 2023-01-26 PROCEDURE — 99999 PR PBB SHADOW E&M-EST. PATIENT-LVL V: CPT | Mod: PBBFAC,,, | Performed by: NURSE PRACTITIONER

## 2023-01-26 PROCEDURE — 99214 OFFICE O/P EST MOD 30 MIN: CPT | Mod: S$PBB,,, | Performed by: NURSE PRACTITIONER

## 2023-01-26 PROCEDURE — 99999 PR PBB SHADOW E&M-EST. PATIENT-LVL V: ICD-10-PCS | Mod: PBBFAC,,, | Performed by: NURSE PRACTITIONER

## 2023-01-26 RX ORDER — TENOFOVIR DISOPROXIL FUMARATE 300 MG/1
300 TABLET, FILM COATED ORAL WEEKLY
Qty: 4 TABLET | Refills: 5 | Status: SHIPPED | OUTPATIENT
Start: 2023-01-26 | End: 2023-04-19 | Stop reason: SDUPTHER

## 2023-01-26 NOTE — PROGRESS NOTES
Patient's INR is therapeutic at 2.2.   Patient reports followed previous instructions.   Reports no changes.   Instructions given: continue warfarin 11.25 mg on Tuesdays; and 7.5 mg all other days.  Recheck in two weeks on 2/9/23.   Calendar reviewed with patient.   Patient verbalizes understanding.

## 2023-01-26 NOTE — PROGRESS NOTES
Clinic Follow Up:  [facility] Follow Up Note    Reason for Follow Up:  The primary encounter diagnosis was Hepatic cirrhosis, unspecified hepatic cirrhosis type, unspecified whether ascites present. Diagnoses of Chronic hepatitis B with cirrhosis, Current use of long term anticoagulation, and Chronic viral hepatitis B without delta agent and without coma were also pertinent to this visit.    PCP: Kimberlee Busby       HPI:  This is a 53 y.o. male here for follow up of the above  Pt states that he has been feeling well without any decompensating events  Has been declined for kidney transplant due to cardiac history.   Taking Viread without complaints.   Recent labs are stable  US without lesion or mass   No upper GI bleeding.  No ascites or BLE.  No overt confusion.      Review of Systems   Constitutional:  Negative for chills, fever, malaise/fatigue and weight loss.   Respiratory:  Negative for cough.    Cardiovascular:  Negative for chest pain.   Gastrointestinal:         Per HPI   Musculoskeletal:  Negative for myalgias.   Skin:  Negative for itching and rash.   Neurological:  Negative for headaches.   Psychiatric/Behavioral:  The patient is not nervous/anxious.      Medical History:  Past Medical History:   Diagnosis Date    Anemia     Aortic valve stenosis     s/p mechanical AVR    Atrial fibrillation     Atrial flutter     Cardiomyopathy     CHF (congestive heart failure)     Drug-induced erectile dysfunction     ESRD due to hypertension     HD M, W, F    ESRD on dialysis     GERD (gastroesophageal reflux disease)     Hepatitis B     Hyperlipidemia     Hypertension     Nightmares     Obesity     DANIEL (obstructive sleep apnea) 11/12/2019    Secondary hyperparathyroidism of renal origin     Supraventricular tachycardia     atrial tachycardia    Tachycardia     Valvular regurgitation     AI, TR       Surgical History:   Past Surgical History:   Procedure Laterality Date    ASD REPAIR       age 8 [facility]    AV Graft Creation Left 03/2017    CARDIAC CATHETERIZATION      [facility]     CARDIAC VALVE SURGERY  02/08/2017    22 mm Medtronic AV, 28 mm TV Medtronic annuloplaty ring    COLONOSCOPY N/A 8/27/2019    Procedure: COLONOSCOPY;  Surgeon: Addie John MD;  Location: [facility] ENDO;  Service: Endoscopy;  Laterality: N/A;  dialysis pt *needs K*    COLONOSCOPY N/A 9/3/2020    Procedure: COLONOSCOPY-need INR;  Surgeon: Jemma Youngblood MD;  Location: [facility] ENDO;  Service: Endoscopy;  Laterality: N/A;    ESOPHAGOGASTRODUODENOSCOPY N/A 8/27/2019    Procedure: EGD (ESOPHAGOGASTRODUODENOSCOPY);  Surgeon: Addie John MD;  Location: [facility] ENDO;  Service: Endoscopy;  Laterality: N/A;    RADIOFREQUENCY ABLATION  03/13/2017    Atrial tachycardia    REMOVAL OF GRAFT Left 7/2/2020    Procedure: REMOVAL, GRAFT;  Surgeon: Sascha Sidhu MD;  Location: [facility] OR;  Service: Peripheral Vascular;  Laterality: Left;    RIGHT HEART CATHETERIZATION Right 8/12/2021    Procedure: INSERTION, CATHETER, RIGHT HEART;  Surgeon: Mariah Pierce MD;  Location: [facility] CATH LAB;  Service: Cardiology;  Laterality: Right;       Family History:   Family History   Problem Relation Age of Onset    Leukemia Mother     Cancer Mother     Heart attack Father         massive MI at age 67    No Known Problems Sister     No Known Problems Brother     No Known Problems Sister     No Known Problems Sister     Heart failure Paternal Grandmother     Diabetes Paternal Aunt     Hypertension Paternal Aunt     Leukemia Paternal Aunt         CLL    Suicide Paternal Uncle     Anesthesia problems Paternal Uncle     Diabetes Paternal Aunt     Stroke Paternal Aunt     Valvular heart disease Maternal Aunt     Kidney disease Neg Hx     Colon cancer Neg Hx        Social History:   Social History     Tobacco Use    Smoking status: Never    Smokeless tobacco: Never   Substance Use Topics    Alcohol use: No     Comment: quit in 2016; does have heavy drinking  "history; started drinking at age 13; was drinking a 12 pack over the weekend and two 24 ounces of beer a day during the week along with a pint of hard alcohol    Drug use: No       Allergies: Reviewed    Home Medications      Physical Exam:  Vital Signs:  /70 (BP Location: Right arm, Patient Position: Sitting, BP Method: Medium (Manual))   Pulse 67   Ht 6' 2" (1.88 m)   Wt 96.8 kg (213 lb 6.5 oz)   BMI 27.40 kg/m²   Body mass index is 27.4 kg/m².  Physical Exam  Constitutional:       Appearance: He is well-developed.   HENT:      Head: Normocephalic.   Eyes:      General: No scleral icterus.  Pulmonary:      Effort: Pulmonary effort is normal.   Musculoskeletal:         General: Normal range of motion.      Cervical back: Normal range of motion.   Skin:     General: Skin is dry.   Neurological:      Mental Status: He is alert.       Labs: Pertinent labs reviewed.  MELD-Na score: 29 at 1/26/2023 10:46 AM  MELD score: 29 at 1/26/2023 10:46 AM  Calculated from:  Serum Creatinine: On dialysis. Using 4 mg/dL.  Serum Sodium: 138 mmol/L (Using max of 137 mmol/L) at 1/24/2023  8:55 AM  Total Bilirubin: 0.5 mg/dL (Using min of 1 mg/dL) at 1/24/2023  8:55 AM  INR(ratio): 2.2 at 1/26/2023 10:46 AM  Age: 53 years  EV: EGD 2019 without EV  HCC: US 1/23 without lesion or mass     Assessment:  1. Hepatic cirrhosis, unspecified hepatic cirrhosis type, unspecified whether ascites present    2. Chronic hepatitis B with cirrhosis    3. Current use of long term anticoagulation    4. Chronic viral hepatitis B without delta agent and without coma        Recommendations:  Stable without new complaints  - continue current medication  - continue with MELD labs and HCC screening every 6 months  - due for EGD for EV screening.  Will need OK to hold coumadin prior to EGD.        Return to Clinic:  6 months with pre-clinic labs and imaging.     "

## 2023-01-31 ENCOUNTER — HOSPITAL ENCOUNTER (OUTPATIENT)
Dept: PREADMISSION TESTING | Facility: HOSPITAL | Age: 53
Discharge: HOME | End: 2023-01-31
Attending: SURGERY
Payer: MEDICARE

## 2023-01-31 DIAGNOSIS — B18.1 CHRONIC HEPATITIS B WITH CIRRHOSIS: ICD-10-CM

## 2023-01-31 DIAGNOSIS — K74.60 HEPATIC CIRRHOSIS, UNSPECIFIED HEPATIC CIRRHOSIS TYPE, UNSPECIFIED WHETHER ASCITES PRESENT: ICD-10-CM

## 2023-01-31 DIAGNOSIS — K74.60 CHRONIC HEPATITIS B WITH CIRRHOSIS: ICD-10-CM

## 2023-02-09 ENCOUNTER — ANTI-COAG VISIT (OUTPATIENT)
Dept: CARDIOLOGY | Facility: CLINIC | Age: 53
End: 2023-02-09
Payer: MEDICARE

## 2023-02-09 DIAGNOSIS — Z79.01 ANTICOAGULATED: ICD-10-CM

## 2023-02-09 DIAGNOSIS — Z79.01 LONG TERM (CURRENT) USE OF ANTICOAGULANTS: Primary | ICD-10-CM

## 2023-02-09 DIAGNOSIS — Z95.2 HISTORY OF MECHANICAL AORTIC VALVE REPLACEMENT: ICD-10-CM

## 2023-02-09 LAB — INR PPP: 4.2 (ref 2–3)

## 2023-02-09 PROCEDURE — 93793 ANTICOAG MGMT PT WARFARIN: CPT | Mod: ,,,

## 2023-02-09 PROCEDURE — 93793 PR ANTICOAGULANT MGMT FOR PT TAKING WARFARIN: ICD-10-PCS | Mod: ,,,

## 2023-02-09 PROCEDURE — 85610 PROTHROMBIN TIME: CPT | Mod: PBBFAC

## 2023-02-09 NOTE — PROGRESS NOTES
Patient's INR is supra-therapeutic at 4.2.  Patient reports followed previous dosing instructions.  Patient reports drank cranberry juice on 2/8/23.  Re-educated patient on Coumadin Diet and need to keep diet consistent.  Patient reports is now taking Collagen Peptide powder daily.  Advised of increased risk of bleeding; signs/symptoms of bleeding and need to go to ED if experiences.  Patient reports not having any signs/symptoms of bleeding.  Sent to PharmD for dosing/instructions.

## 2023-02-09 NOTE — PROGRESS NOTES
INR not at goal. Medications, chart, and patient findings reviewed. See calendar for adjustments to dose and follow up plan.  Difficult to say what ingredients are in his peptide powder, could be affecting INR.  We will hold one dose and repeat INR in 1 week for close follow up.

## 2023-02-14 ENCOUNTER — TELEPHONE (OUTPATIENT)
Dept: CARDIOLOGY | Facility: CLINIC | Age: 53
End: 2023-02-14
Payer: MEDICARE

## 2023-02-14 DIAGNOSIS — K74.60 HEPATIC CIRRHOSIS, UNSPECIFIED HEPATIC CIRRHOSIS TYPE, UNSPECIFIED WHETHER ASCITES PRESENT: Primary | ICD-10-CM

## 2023-02-14 DIAGNOSIS — B18.1 CHRONIC HEPATITIS B WITH CIRRHOSIS: ICD-10-CM

## 2023-02-14 DIAGNOSIS — K74.60 CHRONIC HEPATITIS B WITH CIRRHOSIS: ICD-10-CM

## 2023-02-16 ENCOUNTER — ANTI-COAG VISIT (OUTPATIENT)
Dept: CARDIOLOGY | Facility: CLINIC | Age: 53
End: 2023-02-16
Payer: MEDICARE

## 2023-02-16 DIAGNOSIS — Z95.2 HISTORY OF MECHANICAL AORTIC VALVE REPLACEMENT: ICD-10-CM

## 2023-02-16 DIAGNOSIS — Z79.01 ANTICOAGULATED: ICD-10-CM

## 2023-02-16 DIAGNOSIS — Z79.01 LONG TERM (CURRENT) USE OF ANTICOAGULANTS: Primary | ICD-10-CM

## 2023-02-16 LAB — INR PPP: 2.2 (ref 2–3)

## 2023-02-16 PROCEDURE — 85610 PROTHROMBIN TIME: CPT | Mod: PBBFAC

## 2023-02-16 PROCEDURE — 93793 PR ANTICOAGULANT MGMT FOR PT TAKING WARFARIN: ICD-10-PCS | Mod: ,,,

## 2023-02-16 PROCEDURE — 93793 ANTICOAG MGMT PT WARFARIN: CPT | Mod: ,,,

## 2023-02-16 NOTE — PROGRESS NOTES
Patient's INR is therapeutic at 2.2.  Patient reports followed previous instructions.  Patient reports he has been eating nuts.  Advised patient importance of keeping diet consistent and increased risk of clotting with eating nuts.  Patient has been given Coumadin Clinic Diet and Therapy information again.  Instructions given: continue warfarin 11.25 mg on Tuesdays and 7.5 mg all other days.  Recheck in two weeks on 3/2/23.  Calendar reviewed with patient.  Patient verbalizes understanding.

## 2023-02-21 ENCOUNTER — HOSPITAL ENCOUNTER (OUTPATIENT)
Dept: PREADMISSION TESTING | Facility: HOSPITAL | Age: 53
Discharge: HOME | End: 2023-02-21
Attending: COLON & RECTAL SURGERY
Payer: MEDICARE

## 2023-02-21 DIAGNOSIS — B18.1 CHRONIC HEPATITIS B WITH CIRRHOSIS: ICD-10-CM

## 2023-02-21 DIAGNOSIS — K74.60 HEPATIC CIRRHOSIS, UNSPECIFIED HEPATIC CIRRHOSIS TYPE, UNSPECIFIED WHETHER ASCITES PRESENT: ICD-10-CM

## 2023-02-21 DIAGNOSIS — K74.60 CHRONIC HEPATITIS B WITH CIRRHOSIS: ICD-10-CM

## 2023-02-27 ENCOUNTER — DOCUMENTATION ONLY (OUTPATIENT)
Dept: NEPHROLOGY | Facility: CLINIC | Age: 53
End: 2023-02-27

## 2023-02-28 NOTE — PROGRESS NOTES
Past Medical History:   Diagnosis Date    Anemia     Aortic valve stenosis     s/p mechanical AVR    Atrial fibrillation     Atrial flutter     Cardiomyopathy     CHF (congestive heart failure)     Drug-induced erectile dysfunction     ESRD due to hypertension     HD M, W, F    ESRD on dialysis     GERD (gastroesophageal reflux disease)     Hepatitis B     Hyperlipidemia     Hypertension     Nightmares     Obesity     DANIEL (obstructive sleep apnea) 11/12/2019    Secondary hyperparathyroidism of renal origin     Supraventricular tachycardia     atrial tachycardia    Tachycardia     Valvular regurgitation     AI, TR     Past Surgical History:   Procedure Laterality Date    ASD REPAIR      age 8 [facility]    AV Graft Creation Left 03/2017    CARDIAC CATHETERIZATION      [facility]     CARDIAC VALVE SURGERY  02/08/2017    22 mm Medtronic AV, 28 mm TV Medtronic annuloplaty ring    COLONOSCOPY N/A 8/27/2019    Procedure: COLONOSCOPY;  Surgeon: Addie John MD;  Location: [facility] ENDO;  Service: Endoscopy;  Laterality: N/A;  dialysis pt *needs K*    COLONOSCOPY N/A 9/3/2020    Procedure: COLONOSCOPY-need INR;  Surgeon: Jemma Youngblood MD;  Location: [facility] ENDO;  Service: Endoscopy;  Laterality: N/A;    ESOPHAGOGASTRODUODENOSCOPY N/A 8/27/2019    Procedure: EGD (ESOPHAGOGASTRODUODENOSCOPY);  Surgeon: Addie John MD;  Location: [facility] ENDO;  Service: Endoscopy;  Laterality: N/A;    RADIOFREQUENCY ABLATION  03/13/2017    Atrial tachycardia    REMOVAL OF GRAFT Left 7/2/2020    Procedure: REMOVAL, GRAFT;  Surgeon: Sascha Sidhu MD;  Location: [facility] OR;  Service: Peripheral Vascular;  Laterality: Left;    RIGHT HEART CATHETERIZATION Right 8/12/2021    Procedure: INSERTION, CATHETER, RIGHT HEART;  Surgeon: Mariah Pierce MD;  Location: [facility] CATH LAB;  Service: Cardiology;  Laterality: Right;     Family History   Problem Relation Age of Onset    Leukemia Mother     Cancer Mother     Heart attack Father         massive  MI at age 67    No Known Problems Sister     No Known Problems Brother     No Known Problems Sister     No Known Problems Sister     Heart failure Paternal Grandmother     Diabetes Paternal Aunt     Hypertension Paternal Aunt     Leukemia Paternal Aunt         CLL    Suicide Paternal Uncle     Anesthesia problems Paternal Uncle     Diabetes Paternal Aunt     Stroke Paternal Aunt     Valvular heart disease Maternal Aunt     Kidney disease Neg Hx     Colon cancer Neg Hx      Social History     Tobacco Use    Smoking status: Never    Smokeless tobacco: Never   Substance Use Topics    Alcohol use: No     Comment: quit in 2016; does have heavy drinking history; started drinking at age 13; was drinking a 12 pack over the weekend and two 24 ounces of beer a day during the week along with a pint of hard alcohol    Drug use: No        Review of Systems:    OBJECTIVE:                          History & Physical      Chief Complaint:  H&P    HPI:        Patient is an  male with ESRD on HD MWF at the [facility].       ROS:        Constitutional: Negative for fever, chills, weight loss, malaise/fatigue and diaphoresis.   HENT: Negative for hearing loss, ear pain, nosebleeds, congestion, sore throat, neck pain, tinnitus and ear discharge.    Eyes: Negative for blurred vision, double vision, photophobia, pain, discharge and redness.   Respiratory: Negative for cough, hemoptysis, sputum production, shortness of breath, wheezing and stridor.    Cardiovascular: Negative for chest pain, palpitations, orthopnea, claudication, leg swelling and PND.   Gastrointestinal: Negative for heartburn, nausea, vomiting, abdominal pain, diarrhea, constipation, blood in stool and melena.   Genitourinary: Negative for dysuria, urgency, frequency, hematuria and flank pain.   Musculoskeletal: Negative for myalgias, back pain, joint pain and falls.   Skin: Negative for itching and rash.   Neurological: Negative  for dizziness, tingling, tremors, sensory change, speech change, focal weakness, seizures, loss of consciousness, weakness and headaches.   Endo/Heme/Allergies: Negative for environmental allergies and polydipsia. Does not bruise/bleed easily.   Psychiatric/Behavioral: Negative for depression, suicidal ideas, hallucinations, memory loss and substance abuse. The patient is not nervous/anxious and does not have insomnia.    All 14 systems reviewed and negative except as noted above.  Balance of review of systems is negative.             Past Medical History:   Diagnosis Date    Anemia     Aortic valve stenosis     s/p mechanical AVR    Atrial fibrillation     Atrial flutter     Cardiomyopathy     CHF (congestive heart failure)     Drug-induced erectile dysfunction     ESRD due to hypertension     HD M, W, F    ESRD on dialysis     GERD (gastroesophageal reflux disease)     Hepatitis B     Hyperlipidemia     Hypertension     Nightmares     Obesity     DANIEL (obstructive sleep apnea) 11/12/2019    Secondary hyperparathyroidism of renal origin     Supraventricular tachycardia     atrial tachycardia    Tachycardia     Valvular regurgitation     AI, TR       Past Surgical History:   Procedure Laterality Date    ASD REPAIR      age 8 [facility]    AV Graft Creation Left 03/2017    CARDIAC CATHETERIZATION      [facility]     CARDIAC VALVE SURGERY  02/08/2017    22 mm Medtronic AV, 28 mm TV Medtronic annuloplaty ring    COLONOSCOPY N/A 8/27/2019    Procedure: COLONOSCOPY;  Surgeon: Addie John MD;  Location: [facility];  Service: Endoscopy;  Laterality: N/A;  dialysis pt *needs K*    COLONOSCOPY N/A 9/3/2020    Procedure: COLONOSCOPY-need INR;  Surgeon: Jemma Youngblood MD;  Location: [facility];  Service: Endoscopy;  Laterality: N/A;    ESOPHAGOGASTRODUODENOSCOPY N/A 8/27/2019    Procedure: EGD (ESOPHAGOGASTRODUODENOSCOPY);  Surgeon: Addie John MD;  Location: [facility];  Service: Endoscopy;  Laterality: N/A;     RADIOFREQUENCY ABLATION  03/13/2017    Atrial tachycardia    REMOVAL OF GRAFT Left 7/2/2020    Procedure: REMOVAL, GRAFT;  Surgeon: Sascha Sidhu MD;  Location: [facility] OR;  Service: Peripheral Vascular;  Laterality: Left;    RIGHT HEART CATHETERIZATION Right 8/12/2021    Procedure: INSERTION, CATHETER, RIGHT HEART;  Surgeon: Mariah Pierce MD;  Location: [facility] CATH LAB;  Service: Cardiology;  Laterality: Right;       Family History   Problem Relation Age of Onset    Leukemia Mother     Cancer Mother     Heart attack Father         massive MI at age 67    No Known Problems Sister     No Known Problems Brother     No Known Problems Sister     No Known Problems Sister     Heart failure Paternal Grandmother     Diabetes Paternal Aunt     Hypertension Paternal Aunt     Leukemia Paternal Aunt         CLL    Suicide Paternal Uncle     Anesthesia problems Paternal Uncle     Diabetes Paternal Aunt     Stroke Paternal Aunt     Valvular heart disease Maternal Aunt     Kidney disease Neg Hx     Colon cancer Neg Hx        Social History     Socioeconomic History    Marital status: Single   Tobacco Use    Smoking status: Never    Smokeless tobacco: Never   Substance and Sexual Activity    Alcohol use: No     Comment: quit in 2016; does have heavy drinking history; started drinking at age 13; was drinking a 12 pack over the weekend and two 24 ounces of beer a day during the week along with a pint of hard alcohol    Drug use: No    Sexual activity: Not Currently   Social History Narrative    Caregiver Nurse Marjan Swain; 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       Current Outpatient Medications   Medication Sig Dispense Refill    albuterol (VENTOLIN HFA) 90 mcg/actuation inhaler Inhale 2 puffs into the lungs every 6 (six) hours as needed for Wheezing or Shortness of Breath. Rescue 18 g 3    amLODIPine (NORVASC) 10 MG tablet Take 1 tablet (10 mg total) by mouth once daily. 30 tablet 6    amoxicillin-clavulanate 500-125mg (AUGMENTIN)  500-125 mg Tab Take 1 tablet (500 mg total) by mouth once daily. (Patient not taking: Reported on 1/26/2023) 14 tablet 0    ascorbic acid, vitamin C, (VITAMIN C) 500 MG tablet Take 1 tablet (500 mg total) by mouth 2 (two) times daily.      b complex vitamins tablet Take 1 tablet by mouth once daily.      calcium acetate,phosphat bind, (PHOSLO) 667 mg capsule Take 3 capsule by mouth three times a day with meals and 1 capsule by mouth twice a day with a snack 390 capsule 12    collagen, bovine, 100 % Powd Apply topically.      dialysis solutions (PERITONEAL DIALYSIS SOLUTION IP) Inject into the peritoneum every Mon, Wed, Fri. Lt FA Fistula, [facility] on M, W, F.      diltiaZEM (TIAZAC) 420 MG Cs24 Take 1 capsule (420 mg total) by mouth once daily. 90 capsule 1    epoetin vikki (PROCRIT) 10,000 unit/mL injection Inject 1 mL (10,000 Units total) into the skin every Mon, Wed, Fri. To be given with HD      ferrous sulfate 324 mg (65 mg iron) TbEC Take 325 mg by mouth once daily.      fish oil-omega-3 fatty acids 300-1,000 mg capsule Take 2 g by mouth once daily.      hydrALAZINE (APRESOLINE) 100 MG tablet Take 1 tablet (100 mg total) by mouth 3 (three) times daily including dialysis. 90 tablet 6    hydroCHLOROthiazide (MICROZIDE) 12.5 mg capsule       hydrOXYzine HCL (ATARAX) 25 MG tablet Take 1 tablet by mouth daily as needed for itching 30 tablet 6    hydrOXYzine pamoate (VISTARIL) 25 MG Cap take 1 capsule by mouth daily as needed for itching 30 capsule 3    labetaloL (NORMODYNE) 200 MG tablet Take 1 tablet by mouth three times daily 90 tablet 6    labetaloL (NORMODYNE) 200 MG tablet Take 1 tablet (200 mg total) by mouth 3 (three) times daily. (Patient not taking: Reported on 1/26/2023) 90 tablet 6    labetaloL (NORMODYNE) 200 MG tablet Take 1 tablet (200 mg total) by mouth 3 (three) times daily. 90 tablet 3    lisinopriL (PRINIVIL,ZESTRIL) 40 MG tablet Take 1 tablet (40 mg total) by mouth once daily. 90  tablet 1    multivitamin with minerals tablet Take 1 tablet by mouth once daily.      omega-3 fatty acids-vitamin E (FISH OIL) 1,000 mg Cap Take 1 capsule by mouth once daily.  0    pantoprazole (PROTONIX) 40 MG tablet TAKE 1 TABLET BY MOUTH ONCE DAILY (Patient not taking: Reported on 1/26/2023) 30 tablet 6    ETHAN-RACQUEL RX 1- mg-mg-mcg Tab TAKE ONE TABLET BY MOUTH EVERY DAY 90 tablet 1    tadalafiL (CIALIS) 20 MG Tab Take 1 tablet (20 mg total) by mouth every 24 hours as needed (erectile dysfunction). 20 tablet 11    tadalafiL (CIALIS) 20 MG Tab Take one tablet by mouth every other day as needed for sexual intercourse. ***NEEDS APPOINTMENT*** (Patient not taking: Reported on 1/26/2023) 10 tablet 2    tenofovir disoproxil fumarate (VIREAD) 300 mg Tab TAKE 1 TABLET BY MOUTH ONCE WEEKLY 4 tablet 5    triamcinolone acetonide 0.1% (KENALOG) 0.1 % ointment Apply topically 2 (two) times daily. 80 g 0    vit b cmplx 3-fa-vit c-biotin 1- mg-mg-mcg (ETHAN-RACQUEL RX) 1- mg-mg-mcg Tab Take 1 tablet by mouth once daily. (Patient not taking: Reported on 1/26/2023) 90 tablet 2    vitamin D (VITAMIN D3) 1000 units Tab Take 1 tablet by mouth daily 30 tablet 6    warfarin (COUMADIN) 1 MG tablet       warfarin (COUMADIN) 7.5 MG tablet Take 2 tablets by mouth on Tuesday, Thursdays, and Saturday; and 1 tablet on all other days of the week or as directed by coumadin clinic. (Patient taking differently: Take 1 1/2 tablets (11.25 mg) by mouth on Tuesdays;  and 1 tablet (7.5 mg) on all other days of the week or as directed by coumadin clinic.) 45 tablet 6     No current facility-administered medications for this visit.     Facility-Administered Medications Ordered in Other Visits   Medication Dose Route Frequency Provider Last Rate Last Admin    0.9%  NaCl infusion   Intravenous Continuous Christopher Jane MD        sodium chloride 0.9% flush 10 mL  10 mL Intravenous PRN Christopher Jane MD           Review of patient's allergies  indicates:  No Known Allergies      There were no vitals filed for this visit.          Physical Exam   Nursing Notes and Vital Signs Reviewed.     Constitutional: Well developed, well nourished. AAOx3, NAD, speech/ comprehension clear   Head: Atraumatic. Normocephalic.   Eyes: PERRL. EOMI. Conjunctivae are not pale. No scleral icterus.   ENT: Mucous membranes are dry. No tongue tremors. Throat clear.  Neck: Supple. No JVD or LN or Carotid Bruits noted B.  Cardiovascular: S1S2 RRR, no murmurs, rubs, or gallops. Distal pulses are 2+ and symmetric.   Pulmonary/Chest: No evidence of respiratory distress. Clear to auscultation bilaterally. No wheezing, rales or rhonchi. No chest wall TTP.   Abdominal: Soft and non-distended. There is no tenderness. No rebound, guarding, or rigidity. No organomegaly. No mass or viscera palpable  Musculoskeletal: FROM in all extremities. No deformities, no TTP, no edema. No midline spinal TTP. No step-offs. Pelvis is stable to compression. No cyanosis. Moves all four extremities.   Skin: Skin is warm and dry.   Neurological: No gross neurological deficits, Strength 5/5 B, is equal in the upper and lower extremities bilaterally. No sensory deficits to light touch. No pronator drift.  DTRs are 2+ and equal throughout.   Psychiatric: Good eye contact. Normal Affect.      Laboratory Data:  Reviewed and noted in plan where applicable- Please see chart for full laboratory data.       Lab Results   Component Value Date    WBC 5.40 01/24/2023    HGB 10.5 (L) 01/24/2023    HCT 32.3 (L) 01/24/2023     (H) 01/24/2023     01/24/2023     BMP  Lab Results   Component Value Date     01/24/2023    K 5.2 (H) 01/24/2023    CL 98 01/24/2023    CO2 27 01/24/2023    BUN 48 (H) 01/24/2023    CREATININE 12.5 (H) 01/24/2023    CALCIUM 9.9 01/24/2023    ANIONGAP 13 01/24/2023    ESTGFRAFRICA 4.9 (A) 07/21/2022    EGFRNONAA 4.3 (A) 07/21/2022     CMP  Sodium   Date Value Ref Range Status    01/24/2023 138 136 - 145 mmol/L Final     Potassium   Date Value Ref Range Status   01/24/2023 5.2 (H) 3.5 - 5.1 mmol/L Final     Comment:     *Result may be interfered by visible hemolysis     Chloride   Date Value Ref Range Status   01/24/2023 98 95 - 110 mmol/L Final     CO2   Date Value Ref Range Status   01/24/2023 27 23 - 29 mmol/L Final     Glucose   Date Value Ref Range Status   01/24/2023 88 70 - 110 mg/dL Final     BUN   Date Value Ref Range Status   01/24/2023 48 (H) 6 - 20 mg/dL Final     Creatinine   Date Value Ref Range Status   01/24/2023 12.5 (H) 0.5 - 1.4 mg/dL Final     Calcium   Date Value Ref Range Status   01/24/2023 9.9 8.7 - 10.5 mg/dL Final     Total Protein   Date Value Ref Range Status   01/24/2023 8.1 6.0 - 8.4 g/dL Final     Comment:     *Result may be interfered by visible hemolysis     Albumin   Date Value Ref Range Status   01/24/2023 3.5 3.5 - 5.2 g/dL Final     Total Bilirubin   Date Value Ref Range Status   01/24/2023 0.5 0.1 - 1.0 mg/dL Final     Comment:     For infants and newborns, interpretation of results should be based  on gestational age, weight and in agreement with clinical  observations.    Premature Infant recommended reference ranges:  Up to 24 hours.............<8.0 mg/dL  Up to 48 hours............<12.0 mg/dL  3-5 days..................<15.0 mg/dL  6-29 days.................<15.0 mg/dL       Alkaline Phosphatase   Date Value Ref Range Status   01/24/2023 64 55 - 135 U/L Final     AST   Date Value Ref Range Status   01/24/2023 25 10 - 40 U/L Final     Comment:     *Result may be interfered by visible hemolysis     ALT   Date Value Ref Range Status   01/24/2023 18 10 - 44 U/L Final     Anion Gap   Date Value Ref Range Status   01/24/2023 13 8 - 16 mmol/L Final     eGFR if    Date Value Ref Range Status   07/21/2022 4.9 (A) >60 mL/min/1.73 m^2 Final     eGFR if non    Date Value Ref Range Status   07/21/2022 4.3 (A) >60 mL/min/1.73 m^2  Final     Comment:     Calculation used to obtain the estimated glomerular filtration  rate (eGFR) is the CKD-EPI equation.        Lab Results   Component Value Date    CALCIUM 9.9 01/24/2023    PHOS 3.6 01/23/2021     Lab Results   Component Value Date    K 5.2 (H) 01/24/2023     Lab Results   Component Value Date    LABPROT 13.1 (H) 01/24/2023    ALBUMIN 3.5 01/24/2023     Lab Results   Component Value Date    HGBA1C 4.8 09/20/2019       BMP  @[id number](GLU,NA,K,Cl,CO2,BUN,Creatinine,Calcium,MG)@      Radiology:  Reviewed and noted in plan where applicable- Please see chart for full radiology data.            ASSESSMENT/PLAN:     Patient Active Problem List   Diagnosis    Essential hypertension    Hypertensive cardiomegaly with heart failure    Pulmonary HTN    Adult congenital heart disease    Hyperglycemia    History of mechanical aortic valve replacement    Postprocedural hypotension    ESRD (end stage renal disease) on dialysis    Severe protein-calorie malnutrition    Anemia of chronic disease    Chronic diastolic heart failure    Atrial tachycardia    History of radiofrequency ablation for complex right atrial arrhythmia    Drug-induced erectile dysfunction    Chronic pulmonary heart disease    Secondary hyperparathyroidism of renal origin    Atrial fibrillation    Anticoagulated    Encounter for colorectal cancer screening    Colon cancer screening    Special screening for malignant neoplasms, colon    Abnormal diffusion capacity determined by pulmonary function test    Mixed restrictive and obstructive lung disease    DANIEL (obstructive sleep apnea)    Nocturnal hypoxemia due to obstructive chronic bronchitis    Pulmonary nodule, left    Psychophysiological insomnia    Primary snoring    Periodic limb movement disorder (PLMD)    Inadequate sleep hygiene    End stage renal disease    Arteriovenous graft infection    NSVT (nonsustained ventricular tachycardia)    Bleeding from dialysis shunt    Disorder of  electrolytes    Anemia associated with chronic renal failure    Hepatic cirrhosis         PLAN:      Assessment and plan:    1.  ESRD:  Doing well on dialysis. We will continue hemodialysis treatments three times a week, maintaining a URR of 70% or greater and a Kt/V of 1.20.  Currently, the patient is stable.    2.  Anemia:  We will check hemoglobin at least monthly, target range 10 to 11, transferrin saturation monthly, and ferritin quarterly.  We will dose Epogen and iron according to monthly blood work and according to protocol.    3.  Hypertension:  Currently controlled with current medications, sodium and fluid   restrictions and dialysis prescription.     4.  Hyperparathyroidism secondary to renal origin:  We will check intact PTH on a quarterly basis.  We will dose vitamin D according to blood work and protocol.      Tara Yoon, DNP

## 2023-03-02 ENCOUNTER — ANTI-COAG VISIT (OUTPATIENT)
Dept: CARDIOLOGY | Facility: CLINIC | Age: 53
End: 2023-03-02
Payer: MEDICARE

## 2023-03-02 DIAGNOSIS — Z79.01 ANTICOAGULATED: ICD-10-CM

## 2023-03-02 DIAGNOSIS — Z95.2 HISTORY OF MECHANICAL AORTIC VALVE REPLACEMENT: ICD-10-CM

## 2023-03-02 DIAGNOSIS — Z79.01 LONG TERM (CURRENT) USE OF ANTICOAGULANTS: Primary | ICD-10-CM

## 2023-03-02 LAB — INR PPP: 3.5 (ref 2–3)

## 2023-03-02 PROCEDURE — 93793 PR ANTICOAGULANT MGMT FOR PT TAKING WARFARIN: ICD-10-PCS | Mod: ,,,

## 2023-03-02 PROCEDURE — 85610 PROTHROMBIN TIME: CPT | Mod: PBBFAC

## 2023-03-02 PROCEDURE — 93793 ANTICOAG MGMT PT WARFARIN: CPT | Mod: ,,,

## 2023-03-02 NOTE — PROGRESS NOTES
Patient's INR is supra-therapeutic at 3.5.  Patient reports he followed previous dosing instructions.  Reports he did not incorporate greens in diet.  Advised of importance of keeping diet consistent.   Advised of increased risk of bleeding; signs/symptoms of bleeding and need to go to ED if experiences any.  Reports not having any signs/symptoms of bleeding.  Instructions given: Hold warfarin today-3/2/23; then re-challenge warfarin 11.25 mg on Tuesdays; and 7.5 mg all other days.  Recheck in two weeks on 3/16/23.  Calendar reviewed with patient.  Patient verbalizes understanding.

## 2023-03-30 RX ORDER — PANTOPRAZOLE SODIUM 40 MG/1
TABLET, DELAYED RELEASE ORAL
Qty: 30 TABLET | Refills: 6 | Status: CANCELLED | OUTPATIENT
Start: 2023-03-30

## 2023-03-30 RX ORDER — CALCIUM ACETATE 667 MG/1
CAPSULE ORAL
Qty: 390 CAPSULE | Refills: 12 | Status: CANCELLED | OUTPATIENT
Start: 2023-03-30

## 2023-03-31 RX ORDER — PANTOPRAZOLE SODIUM 40 MG/1
TABLET, DELAYED RELEASE ORAL
Qty: 30 TABLET | Refills: 6 | Status: CANCELLED | OUTPATIENT
Start: 2023-03-30

## 2023-03-31 RX ORDER — CALCIUM ACETATE 667 MG/1
CAPSULE ORAL
Qty: 390 CAPSULE | Refills: 12 | Status: CANCELLED | OUTPATIENT
Start: 2023-03-30

## 2023-04-04 ENCOUNTER — ANTI-COAG VISIT (OUTPATIENT)
Dept: CARDIOLOGY | Facility: CLINIC | Age: 53
End: 2023-04-04
Payer: MEDICARE

## 2023-04-04 ENCOUNTER — OFFICE VISIT (OUTPATIENT)
Dept: CARDIOLOGY | Facility: CLINIC | Age: 53
End: 2023-04-04
Payer: MEDICARE

## 2023-04-04 VITALS
OXYGEN SATURATION: 98 % | DIASTOLIC BLOOD PRESSURE: 72 MMHG | WEIGHT: 212.5 LBS | BODY MASS INDEX: 27.29 KG/M2 | HEART RATE: 80 BPM | SYSTOLIC BLOOD PRESSURE: 118 MMHG

## 2023-04-04 DIAGNOSIS — N52.9 ERECTILE DYSFUNCTION, UNSPECIFIED ERECTILE DYSFUNCTION TYPE: ICD-10-CM

## 2023-04-04 DIAGNOSIS — B18.1 CHRONIC HEPATITIS B: ICD-10-CM

## 2023-04-04 DIAGNOSIS — I34.2 NONRHEUMATIC MITRAL VALVE STENOSIS: ICD-10-CM

## 2023-04-04 DIAGNOSIS — I27.20 PULMONARY HTN: ICD-10-CM

## 2023-04-04 DIAGNOSIS — I48.91 ATRIAL FIBRILLATION, UNSPECIFIED TYPE: ICD-10-CM

## 2023-04-04 DIAGNOSIS — I50.32 CHRONIC DIASTOLIC HEART FAILURE: ICD-10-CM

## 2023-04-04 DIAGNOSIS — I11.0 HYPERTENSIVE CARDIOMEGALY WITH HEART FAILURE: ICD-10-CM

## 2023-04-04 DIAGNOSIS — I10 ESSENTIAL HYPERTENSION: ICD-10-CM

## 2023-04-04 DIAGNOSIS — Z98.890 HISTORY OF RADIOFREQUENCY ABLATION FOR COMPLEX RIGHT ATRIAL ARRHYTHMIA: ICD-10-CM

## 2023-04-04 DIAGNOSIS — Q24.9 ADULT CONGENITAL HEART DISEASE: ICD-10-CM

## 2023-04-04 DIAGNOSIS — Z79.01 ANTICOAGULATED: ICD-10-CM

## 2023-04-04 DIAGNOSIS — Z95.2 HISTORY OF MECHANICAL AORTIC VALVE REPLACEMENT: Primary | ICD-10-CM

## 2023-04-04 DIAGNOSIS — I27.9 CHRONIC PULMONARY HEART DISEASE: ICD-10-CM

## 2023-04-04 DIAGNOSIS — I27.20 PULMONARY HYPERTENSION: ICD-10-CM

## 2023-04-04 DIAGNOSIS — Z95.2 HISTORY OF MECHANICAL AORTIC VALVE REPLACEMENT: ICD-10-CM

## 2023-04-04 DIAGNOSIS — Z95.2 H/O MECHANICAL AORTIC VALVE REPLACEMENT: ICD-10-CM

## 2023-04-04 DIAGNOSIS — Z95.2 S/P AVR (AORTIC VALVE REPLACEMENT): ICD-10-CM

## 2023-04-04 DIAGNOSIS — I49.3 PVC'S (PREMATURE VENTRICULAR CONTRACTIONS): ICD-10-CM

## 2023-04-04 DIAGNOSIS — N18.6 END-STAGE RENAL DISEASE: ICD-10-CM

## 2023-04-04 DIAGNOSIS — Z95.2 H/O AORTIC VALVE REPLACEMENT: ICD-10-CM

## 2023-04-04 DIAGNOSIS — B18.1 CHRONIC VIRAL HEPATITIS B WITHOUT DELTA AGENT AND WITHOUT COMA: ICD-10-CM

## 2023-04-04 DIAGNOSIS — Z79.01 LONG TERM (CURRENT) USE OF ANTICOAGULANTS: Primary | ICD-10-CM

## 2023-04-04 LAB — INR PPP: 3 (ref 2–3)

## 2023-04-04 PROCEDURE — 99999 PR PBB SHADOW E&M-EST. PATIENT-LVL V: ICD-10-PCS | Mod: PBBFAC,,, | Performed by: INTERNAL MEDICINE

## 2023-04-04 PROCEDURE — 99215 OFFICE O/P EST HI 40 MIN: CPT | Mod: PBBFAC | Performed by: INTERNAL MEDICINE

## 2023-04-04 PROCEDURE — 85610 PROTHROMBIN TIME: CPT | Mod: PBBFAC

## 2023-04-04 PROCEDURE — 99214 OFFICE O/P EST MOD 30 MIN: CPT | Mod: S$PBB,,, | Performed by: INTERNAL MEDICINE

## 2023-04-04 PROCEDURE — 99214 PR OFFICE/OUTPT VISIT, EST, LEVL IV, 30-39 MIN: ICD-10-PCS | Mod: S$PBB,,, | Performed by: INTERNAL MEDICINE

## 2023-04-04 PROCEDURE — 99999 PR PBB SHADOW E&M-EST. PATIENT-LVL V: CPT | Mod: PBBFAC,,, | Performed by: INTERNAL MEDICINE

## 2023-04-04 RX ORDER — SILDENAFIL 100 MG/1
100 TABLET, FILM COATED ORAL DAILY PRN
Qty: 10 TABLET | Refills: 1 | Status: SHIPPED | OUTPATIENT
Start: 2023-04-04 | End: 2024-04-03

## 2023-04-04 NOTE — PROGRESS NOTES
Patient's INR is therapeutic at 3.0.  Patient reports followed previous instructions.  Patient reports has not been eating greens recently.  Re-educated patient on importance of keeping diet consistent.  Patient  reports he will eat 1/3 cup of greens today-4/4/23.  Instructions given: continue warfarin 11.25 mg on Tuesdays;and 7.5 mg all other days.  Recheck in two weeks on 4/18/23.  Calendar reviewed with patient.  Patient verbalizes understanding.

## 2023-04-04 NOTE — PROGRESS NOTES
"Subjective:   Patient ID:  Isidro White Jr. is a 53 y.o. male who presents for cardiac consult of No chief complaint on file.          The patient came in today for cardiac consult of No chief complaint on file.      Isidro White Jr. is a 53 y.o. male pt with ASD closure at age 8 ([facility]), HFpEF, s/p AVR with a 22 mm mechanical valve and TV annuloplasty with a 28 mm ring 3/17, HTN, PHTN, Mitral stenosis, SVT, EP performed RFA (3/13/17) and has been on HD - MWF since Feb 8,2016 here for CV follow up.     12/13/18  Holter from 9/4/18 revealed freq PVCs, dilt increased to 240 mg. No palpitations. Has been doing well lately, BP is well controlled. Labwork from HD has been normal. Still makes some urine, says he has some hematuria. Will need annual stress and 2D echo. He is also undergoing workup for HepB will see Dr. Youngblood.     3/15/19  Recent ER eval at [facility] - He presented with chest pain and shortness of breath. He is a dialysis patient has a history of anemia. He states "I believe that the lack of oxygen". Patient has received blood transfusions in the past but not recently. Patient had dialysis yesterday and is a Monday Wednesday Friday dialysis patient. His troponin is mildly elevated. His chest x-ray is relatively clear. We do not see a large effusion and there is no obvious infiltrate at this time. Patient states at times he has chest pain and it feels like he is smothering. The safest plan is to admit the patient and repeat his cardiac enzymes and consider given the patient a blood transfusion. However the patient is a dialysis patient so we are unable to admit him at this facility. Patient nephrologist is an [facility] and [facility]  Pt Left AMA and now here for follow up.   Pt has SOB usually at night, feels suffocated and can hear himself snore and wakes up. No prior sleep study.      6/18/19  Pt will need EGD/Cscope. Will be bridged with heparin while holding " coumadin and followed at coumadin clinic. Pt also needs liver biopsy, he will be ruled out for esophageal varicies. He also had recent admission for PNA, tx with abx. Now feels well, breathing normal. No CP/SOB.     9/19/19  Per recent workup - Fibroscan suggests cirrhosis and he has thrombocytopenia though no other findings of portal hypertension on imaging or EGD. OK to proceed with kidney transplant from hepatology standpoint. For renal transplant process- recent stress and echo are normal.     2/27/20  He was prescibed meds for ED - he was given Cialis 5 mg. Overall feels well, no CP/SOB. He was denied transplant recently but will have further workup next month with stress and echo. He will also need sleep study.   ECG - NSR, LAE, LAD, nonspec changes    6/23/20  ECHO and stress neg in 3/2020. ECHO with grade 2 DD, PA pressure 42 mmHg. Recent INR therapeutic. Sleep study since last visit neg.   Occ decreased energy/fatigue with exertion. He walks 6 labs around the park at times but other times can't do as much. Dry weight is 101 kg. Today he is 103 kg.  He will have cystoscopy in July by Dr. Bass.     7/16/20 - Hosp follow up   He was admitted to Medical Surgical Unit for Arteriovenous graft infection with left graft excision per Vascular Surgery.  IV antibiotics continued.  Heparin gtt noted.  Coumadin continued with pharmacy to dose.  Increased bleeding noted with anticoagulation adjusted.  H/H stable with downward trend noted. On 7/5/2020, pt H/H declined with PRBCs x 1 unit given.  Pt remained stable and no bleeding from AVG site appreciated. He had NSVT BB and CCB adjusted as well.     INR 1.9 last week. He had to get PRBC 2 days due to HGB 6. He was scheduled to get staples out on Monday by Dr. Sidhu, had swelling and bleeding from AVG. He had a hematoma. He went to HD yesterday AM and was told his HGB was 7.0. He went to [facility]. His Epo was increased and iron increased.     10/27/20  Still undergoing  workup for cirrhosis, unclear etiology. Will have repeat Fibroscan in December and maybe biopsy. INR 3.4 today. He has tried cialis with improvement. Bp stable. He had mild pain and swelling on left arm but improved now. He has been active.     12/23/20  He had PNA 2 years ago and feels like same symptoms. He has been having HAY, cough. No CP. He also has palpitations. He tried to get more fluid off with HD but could not get more due to cramping. 101.1 KG is dry weight.   ECG - NSR, LAD, Nonspec ST T changes    1/14/21  Last visit had elevated BNP, discussed needs extra fluid off at HD - dry weight is 98. He had to have blood transfusion, HGB was 6.6.  He had one unit of blood, he feels burning sensation in chest, and has more cough with trace blood.     2/9/21  His recent HGB is 7.6, he had blood transfusion last month at ER Hgb was 6.8. He will f/u with HD and increase Epo as needed. He still has more HAY. ECHO with severe MS, will refer back to Dr. Gastelum    5/11/21  Pt had repeat echo in March with mild to mod MS with mean gradient 6 mmHg. BP today elevated 150s/90s. HR 90s. He just had lunch with salty food possibly with elevated BP. He had 6 min walk and further pulm testing.     Eval by Dr. Flores - This patient was referred because of the incorrect diagnosis of severe mitral valve stenosis.  This echo was repeated here this morning (see above). He has MAC with mild MS and this does not need treatment at this time.  In fact, renal transplantation will improve his CA++ metabolism and reduce the risk of progression of his MAC.  He is asymptomatic from all of his cardiac problems as they are well managed.  His is a good candidate for LDR renal transplantation.     7/1/21  Pt had recent transplant eval and denied due to elevated PASP > 50 mmHg. Discussed will need to have pulm HTN clinic eval and RHC. Nuclear stress in May neg.     10/14/21  BP low normal. HR elevated at HD now. He had RHC in Aug by Dr. Pierce had  elevated filling pressures need more volume removal.     12/28/21  He will need a EGD, will need to be off heparin and bridged. He has not followed up with pulm HTN clinic regarding PDE5 tx. He feels well otherwise, denies CP/SOB. Is active, compliant with HD and meds.      7/7/22 - Dr. Sun visit  Sees Dr. Gudino in clinic  Reports chest pain during dialysis yesterday, left sided  Believes the right amount of fluid was removed during   Symptoms recurred today when he woke up with chest pain with radiation to back, worse with movement  Reports having a bad mattress   INR 2.4  Stress test 5/21 without ischemia   EKG (similar 12/21, NSR,LAD, nonspec intraventricular block)    9/27/22  Pt saw Dr. Sun in July for CP. ECHO in July 2022 with normal bi V function, stable valves AV and MV, mild to mod MS, PASP 54 mmHg. BP and Hr well controlled today. BMI 27 - 212 lbs. He has more issues with ED lately, he [facility] to go to [facility].     4/4/23  BP and Hr well controlled. BMI 27 - 212 lbs. He has been taken off transplant list due to high risk nature with pulm HTN.     Patient feels  no PND, no palpitation, no dizziness, no syncope, no CNS symptoms.    Patient has dec exercise tolerance.     Patient is compliant with medications.    Results for orders placed during the hospital encounter of 07/07/22    Echo    Interpretation Summary  · The left ventricle is normal in size with moderate concentric hypertrophy and normal systolic function.  · Mild left atrial enlargement.  · The estimated ejection fraction is 60%.  · Grade II left ventricular diastolic dysfunction.  · There is abnormal septal wall motion consistent with post-operative status.  · Normal right ventricular size with normal right ventricular systolic function.  · Mild right atrial enlargement.  · There is a bileaflet tilting disc mechanical aortic valve present.  · The aortic valve mean gradient is 15 mmHg with a dimensionless index of 0.52.  · The mean  diastolic gradient across the mitral valve is 11 mmHg at a heart rate of bpm.  · There is mild to moderate mitral stenosis.  · There is moderate pulmonary hypertension.  · Mild tricuspid regurgitation.  · Intermediate central venous pressure (8 mmHg).  · The estimated PA systolic pressure is 54 mmHg.  · The aortic root is mildly dilated.      RHC  Summary       The estimated blood loss was <50 mL.  Believe PA pressures are elevated in setting of increased filling pressures. Mean PA pressure is right around 34.  Recommend stricter volume management, review of echoes for the last 3 year demonstrates similar findings.  If this is a barrier to renal transplant, can consider adding PDE5, however do not think he will be able to tolerate given increased filling pressure and patient stating he drops pressures with HD.  Will likely need to discuss with abdominal transplant team.     The procedure log was documented by Documenter: Isis Sevilla and verified by Mariah Pierce MD.      Results for orders placed during the hospital encounter of 05/25/21    Nuclear Stress - Cardiology Interpreted    Interpretation Summary    Normal myocardial perfusion scan. There is no evidence of myocardial ischemia or infarction.    The gated perfusion images showed an ejection fraction of 65% at rest. The gated perfusion images showed an ejection fraction of 47% post stress.    The EKG portion of this study is negative for ischemia.    · Intermediate central venous pressure (8 mmHg).       Cath 2/17  1. Coronary angiography.      a.     Left main widely patent.      b.     LAD widely patent.      c.     Ramus widely patent and massive in size.      d.     Circumflex widely patent with a small OM 1 and OM 2 branch.      e.     Right coronary widely patent.  2. Hemodynamics:  Right heart catheterization.      a.     Pulmonary capillary wedge pressure 33 at end-expiration.      b.     Pulmonary artery pressure 78/42.      c.     Right  ventricular pressure 70/21.      d.     Right atrial pressure 22 with a peak V-wave of 24.      e.     Cardiac output by thermodilution is between 6 and 7       L/minute, by Patricia 6.1 L/minute.    CONCLUSION  1. Normal coronary arteries.  2. Pulmonary hypertension.  3. Severe aortic insufficiency.      Past Medical History:   Diagnosis Date    Anemia     Aortic valve stenosis     s/p mechanical AVR    Atrial fibrillation     Atrial flutter     Cardiomyopathy     CHF (congestive heart failure)     Drug-induced erectile dysfunction     ESRD due to hypertension     HD M, W, F    ESRD on dialysis     GERD (gastroesophageal reflux disease)     Hepatitis B     Hyperlipidemia     Hypertension     Nightmares     Obesity     DANIEL (obstructive sleep apnea) 11/12/2019    Secondary hyperparathyroidism of renal origin     Supraventricular tachycardia     atrial tachycardia    Tachycardia     Valvular regurgitation     AI, TR       Past Surgical History:   Procedure Laterality Date    ASD REPAIR      age 8 [facility]    AV Graft Creation Left 03/2017    CARDIAC CATHETERIZATION      [facility]     CARDIAC VALVE SURGERY  02/08/2017    22 mm Medtronic AV, 28 mm TV Medtronic annuloplaty ring    COLONOSCOPY N/A 8/27/2019    Procedure: COLONOSCOPY;  Surgeon: Addie John MD;  Location: [facility] ENDO;  Service: Endoscopy;  Laterality: N/A;  dialysis pt *needs K*    COLONOSCOPY N/A 9/3/2020    Procedure: COLONOSCOPY-need INR;  Surgeon: Jemma Youngblood MD;  Location: [facility] ENDO;  Service: Endoscopy;  Laterality: N/A;    ESOPHAGOGASTRODUODENOSCOPY N/A 8/27/2019    Procedure: EGD (ESOPHAGOGASTRODUODENOSCOPY);  Surgeon: Addie John MD;  Location: [facility] ENDO;  Service: Endoscopy;  Laterality: N/A;    RADIOFREQUENCY ABLATION  03/13/2017    Atrial tachycardia    REMOVAL OF GRAFT Left 7/2/2020    Procedure: REMOVAL, GRAFT;  Surgeon: Sascha Sidhu MD;  Location: [facility] OR;  Service: Peripheral Vascular;  Laterality: Left;    RIGHT HEART  CATHETERIZATION Right 8/12/2021    Procedure: INSERTION, CATHETER, RIGHT HEART;  Surgeon: Mariah Pierce MD;  Location: [facility] CATH LAB;  Service: Cardiology;  Laterality: Right;       Social History     Tobacco Use    Smoking status: Never    Smokeless tobacco: Never   Substance Use Topics    Alcohol use: No     Comment: quit in 2016; does have heavy drinking history; started drinking at age 13; was drinking a 12 pack over the weekend and two 24 ounces of beer a day during the week along with a pint of hard alcohol    Drug use: No       Family History   Problem Relation Age of Onset    Leukemia Mother     Cancer Mother     Heart attack Father         massive MI at age 67    No Known Problems Sister     No Known Problems Brother     No Known Problems Sister     No Known Problems Sister     Heart failure Paternal Grandmother     Diabetes Paternal Aunt     Hypertension Paternal Aunt     Leukemia Paternal Aunt         CLL    Suicide Paternal Uncle     Anesthesia problems Paternal Uncle     Diabetes Paternal Aunt     Stroke Paternal Aunt     Valvular heart disease Maternal Aunt     Kidney disease Neg Hx     Colon cancer Neg Hx            Review of Systems   Constitutional:  Positive for malaise/fatigue.   HENT: Negative.     Eyes: Negative.    Respiratory:  Negative for shortness of breath.    Cardiovascular:  Negative for chest pain and palpitations.   Gastrointestinal: Negative.    Genitourinary:  Negative for hematuria.   Musculoskeletal:  Positive for joint pain.   Skin: Negative.    Neurological: Negative.    Endo/Heme/Allergies: Negative.    Psychiatric/Behavioral: Negative.     All 12 systems otherwise negative.    Wt Readings from Last 3 Encounters:   04/04/23 96.4 kg (212 lb 8.4 oz)   01/26/23 96.8 kg (213 lb 6.5 oz)   09/27/22 96.6 kg (212 lb 15.4 oz)     Temp Readings from Last 3 Encounters:   04/28/22 97.5 °F (36.4 °C) (Oral)   02/03/22 98.6 °F (37 °C) (Oral)   01/13/22 98.3 °F (36.8 °C)     BP Readings from  Last 3 Encounters:   04/04/23 118/72   01/26/23 128/70   09/27/22 118/68     Pulse Readings from Last 3 Encounters:   04/04/23 80   01/26/23 67   09/27/22 83       /72   Pulse 80   Wt 96.4 kg (212 lb 8.4 oz)   SpO2 98%   BMI 27.29 kg/m²      Objective:   Physical Exam  Vitals and nursing note reviewed.   Constitutional:       General: He is not in acute distress.     Appearance: He is well-developed. He is not diaphoretic.   HENT:      Head: Normocephalic and atraumatic.      Nose: Nose normal.   Eyes:      General: No scleral icterus.     Conjunctiva/sclera: Conjunctivae normal.   Neck:      Thyroid: No thyromegaly.      Vascular: No JVD.   Cardiovascular:      Rate and Rhythm: Normal rate and regular rhythm.      Heart sounds: S1 normal and S2 normal. Murmur heard.   Midsystolic murmur is present at the upper right sternal border.     No friction rub. No gallop. No S3 or S4 sounds.      Comments: Crisp S2  Pulmonary:      Effort: Pulmonary effort is normal. No respiratory distress.      Breath sounds: Normal breath sounds. No stridor. No wheezing or rales.   Chest:      Chest wall: No tenderness.   Abdominal:      General: Bowel sounds are normal. There is no distension.      Palpations: Abdomen is soft. There is no mass.      Tenderness: There is no abdominal tenderness. There is no rebound.   Genitourinary:     Comments: Deferred  Musculoskeletal:         General: No tenderness or deformity. Normal range of motion.      Cervical back: Normal range of motion and neck supple.   Lymphadenopathy:      Cervical: No cervical adenopathy.   Skin:     General: Skin is warm and dry.      Coloration: Skin is not pale.      Findings: No erythema or rash.   Neurological:      Mental Status: He is alert and oriented to person, place, and time.      Motor: No abnormal muscle tone.      Coordination: Coordination normal.   Psychiatric:         Behavior: Behavior normal.         Thought Content: Thought content normal.          Judgment: Judgment normal.       Lab Results   Component Value Date     01/24/2023    K 5.2 (H) 01/24/2023    CL 98 01/24/2023    CO2 27 01/24/2023    BUN 48 (H) 01/24/2023    CREATININE 12.5 (H) 01/24/2023    GLU 88 01/24/2023    HGBA1C 4.8 09/20/2019    MG 2.3 08/12/2021    AST 25 01/24/2023    ALT 18 01/24/2023    ALBUMIN 3.5 01/24/2023    PROT 8.1 01/24/2023    BILITOT 0.5 01/24/2023    WBC 5.40 01/24/2023    HGB 10.5 (L) 01/24/2023    HCT 32.3 (L) 01/24/2023    HCT 25 (L) 02/08/2017     (H) 01/24/2023     01/24/2023    INR 3.5 (A) 03/02/2023    INR 1.2 01/24/2023    CHOL 248 (H) 11/05/2020    HDL 42 11/05/2020    LDLCALC 164.8 (H) 11/05/2020    TRIG 206 (H) 11/05/2020     (H) 07/01/2021     Assessment:      1. History of mechanical aortic valve replacement    2. PVC's (premature ventricular contractions)    3. Atrial fibrillation, unspecified type    4. H/O mechanical aortic valve replacement    5. Chronic viral hepatitis B without delta agent and without coma    6. End-stage renal disease    7. Chronic hepatitis B    8. Pulmonary hypertension    9. H/O aortic valve replacement    10. Essential hypertension    11. Adult congenital heart disease    12. History of radiofrequency ablation for complex right atrial arrhythmia    13. Chronic diastolic heart failure    14. Chronic pulmonary heart disease    15. S/P AVR (aortic valve replacement)    16. Pulmonary HTN    17. Hypertensive cardiomegaly with heart failure    18. Nonrheumatic mitral valve stenosis    19. Erectile dysfunction, unspecified erectile dysfunction type          Plan:   1. Chronic Diastolic HF  - recovered EF  - cont low salt diet  - pt euvolemic     2. S/p mech AVR, with mild to moderate MS  - cont coumadin  - f/u with coumadin clinic here  - f/u with [facility] - in [place] Gastelum  - ECHO in July 2022 with normal bi V function, stable valves AV and MV, mild to mod MS, PASP 54 mmHg.     3. ESRD - dry weight  improved  - cont HD  - anemia with PRBCs and EPO    4. HTN   - cont meds  - adjusted BP meds - labetelol TID    5. PVCs, SVT s/p RFA   - cont BB and CCB   - order 14 day Vital     6. HLD  - cont statin    7. ED  - cont tx PRN  - f/u urology   - was on Cialis, start Viagra f/u with urology   - monitor BP, avoid nitrates     8. Pulm HTN/ DANIEL  - PASP 56 mmHg. - repeat ECHO ordered   -f/u CHF/pulm HTN clinic - may start PDE5  - f/u with Dr. Pierce    9. Chronic hep B   - cont tx per hepatology  - further workup pending for cirrhosis - EGD    10 .Pre-OP CV evaluation prior to renal transplant  - Elevated CV risk for renal transplant but discuss with pulm HTN clinic  - may have further [facility]up at [facility]      Thank you for allowing me to participate in this patient's care. Please do not hesitate to contact me with any questions or concerns. Consult note has been forwarded to the referral physician.

## 2023-04-12 ENCOUNTER — TELEPHONE (OUTPATIENT)
Dept: TRANSPLANT | Facility: CLINIC | Age: 53
End: 2023-04-12
Payer: MEDICARE

## 2023-04-12 DIAGNOSIS — Z79.899 POLYPHARMACY: ICD-10-CM

## 2023-04-12 DIAGNOSIS — R06.82 TACHYPNEA: Primary | ICD-10-CM

## 2023-04-12 DIAGNOSIS — I27.9 CHRONIC PULMONARY HEART DISEASE: ICD-10-CM

## 2023-04-18 ENCOUNTER — ANTI-COAG VISIT (OUTPATIENT)
Dept: CARDIOLOGY | Facility: CLINIC | Age: 53
End: 2023-04-18
Payer: MEDICARE

## 2023-04-18 ENCOUNTER — PATIENT OUTREACH (OUTPATIENT)
Dept: ADMINISTRATIVE | Facility: HOSPITAL | Age: 53
End: 2023-04-18
Payer: MEDICARE

## 2023-04-18 DIAGNOSIS — Z79.01 ANTICOAGULATED: ICD-10-CM

## 2023-04-18 DIAGNOSIS — Z95.2 HISTORY OF MECHANICAL AORTIC VALVE REPLACEMENT: ICD-10-CM

## 2023-04-18 DIAGNOSIS — Z79.01 LONG TERM (CURRENT) USE OF ANTICOAGULANTS: Primary | ICD-10-CM

## 2023-04-18 DIAGNOSIS — I48.91 ATRIAL FIBRILLATION, UNSPECIFIED TYPE: ICD-10-CM

## 2023-04-18 LAB — INR PPP: 1.9 (ref 2–3)

## 2023-04-18 PROCEDURE — 93793 ANTICOAG MGMT PT WARFARIN: CPT | Mod: ,,,

## 2023-04-18 PROCEDURE — 93793 PR ANTICOAGULANT MGMT FOR PT TAKING WARFARIN: ICD-10-PCS | Mod: ,,,

## 2023-04-18 PROCEDURE — 85610 PROTHROMBIN TIME: CPT | Mod: PBBFAC

## 2023-04-18 NOTE — PROGRESS NOTES
Patient's INR is sub-therapeutic at 1.9.   Patient reports he ate more greens than usual.    Advised of increased risk of clotting; signs/symptoms of clotting and need to go to ED if experiences any.  Patient reports not having any signs/symptoms of clotting.  Instructions given: Will give boosted dose of warfarin 15 mg today-4/18/23; then resume warfarin 7.5 mg on 11.25 mg on Tuesdays; and 7.5 mg all other days.  Recheck on 4/27/23 with other appointment.  Patient verbalizes understanding.

## 2023-04-19 DIAGNOSIS — B18.1 CHRONIC VIRAL HEPATITIS B WITHOUT DELTA AGENT AND WITHOUT COMA: ICD-10-CM

## 2023-04-19 RX ORDER — TENOFOVIR DISOPROXIL FUMARATE 300 MG/1
300 TABLET, FILM COATED ORAL WEEKLY
Qty: 4 TABLET | Refills: 5 | Status: SHIPPED | OUTPATIENT
Start: 2023-04-19 | End: 2023-11-01 | Stop reason: SDUPTHER

## 2023-04-25 ENCOUNTER — TELEPHONE (OUTPATIENT)
Dept: CARDIOLOGY | Facility: CLINIC | Age: 53
End: 2023-04-25
Payer: MEDICARE

## 2023-04-25 ENCOUNTER — TELEPHONE (OUTPATIENT)
Dept: PULMONOLOGY | Facility: CLINIC | Age: 53
End: 2023-04-25
Payer: MEDICARE

## 2023-04-25 NOTE — TELEPHONE ENCOUNTER
I called pt to reschedule missed appt with HTS/Dr Pierce from today. No answer. LVM for him to return call.

## 2023-04-26 ENCOUNTER — PATIENT MESSAGE (OUTPATIENT)
Dept: PHARMACY | Facility: CLINIC | Age: 53
End: 2023-04-26
Payer: MEDICARE

## 2023-04-27 ENCOUNTER — HOSPITAL ENCOUNTER (OUTPATIENT)
Dept: CARDIOLOGY | Facility: HOSPITAL | Age: 53
Discharge: HOME | End: 2023-04-27
Attending: INTERNAL MEDICINE
Payer: MEDICARE

## 2023-04-27 ENCOUNTER — OFFICE VISIT (OUTPATIENT)
Dept: UROLOGY | Facility: CLINIC | Age: 53
End: 2023-04-27
Payer: MEDICARE

## 2023-04-27 ENCOUNTER — ANTI-COAG VISIT (OUTPATIENT)
Dept: CARDIOLOGY | Facility: CLINIC | Age: 53
End: 2023-04-27
Payer: MEDICARE

## 2023-04-27 VITALS
BODY MASS INDEX: 27.63 KG/M2 | SYSTOLIC BLOOD PRESSURE: 99 MMHG | DIASTOLIC BLOOD PRESSURE: 64 MMHG | WEIGHT: 215.19 LBS | HEART RATE: 72 BPM

## 2023-04-27 VITALS
BODY MASS INDEX: 27.21 KG/M2 | WEIGHT: 212 LBS | SYSTOLIC BLOOD PRESSURE: 118 MMHG | DIASTOLIC BLOOD PRESSURE: 72 MMHG | HEIGHT: 74 IN

## 2023-04-27 DIAGNOSIS — I27.20 PULMONARY HYPERTENSION: ICD-10-CM

## 2023-04-27 DIAGNOSIS — N28.1 RENAL CYST: ICD-10-CM

## 2023-04-27 DIAGNOSIS — Q24.9 ADULT CONGENITAL HEART DISEASE: ICD-10-CM

## 2023-04-27 DIAGNOSIS — Z95.2 HISTORY OF MECHANICAL AORTIC VALVE REPLACEMENT: ICD-10-CM

## 2023-04-27 DIAGNOSIS — I49.3 PVC'S (PREMATURE VENTRICULAR CONTRACTIONS): ICD-10-CM

## 2023-04-27 DIAGNOSIS — I48.91 ATRIAL FIBRILLATION, UNSPECIFIED TYPE: ICD-10-CM

## 2023-04-27 DIAGNOSIS — B18.1 CHRONIC HEPATITIS B: ICD-10-CM

## 2023-04-27 DIAGNOSIS — R31.0 GROSS HEMATURIA: Primary | ICD-10-CM

## 2023-04-27 DIAGNOSIS — Z95.2 S/P AVR (AORTIC VALVE REPLACEMENT): ICD-10-CM

## 2023-04-27 DIAGNOSIS — Z79.01 ANTICOAGULATED: ICD-10-CM

## 2023-04-27 DIAGNOSIS — N52.9 ERECTILE DYSFUNCTION, UNSPECIFIED ERECTILE DYSFUNCTION TYPE: ICD-10-CM

## 2023-04-27 DIAGNOSIS — N18.6 END-STAGE RENAL DISEASE: ICD-10-CM

## 2023-04-27 DIAGNOSIS — Z98.890 HISTORY OF RADIOFREQUENCY ABLATION FOR COMPLEX RIGHT ATRIAL ARRHYTHMIA: ICD-10-CM

## 2023-04-27 DIAGNOSIS — I11.0 HYPERTENSIVE CARDIOMEGALY WITH HEART FAILURE: ICD-10-CM

## 2023-04-27 DIAGNOSIS — Z79.01 LONG TERM (CURRENT) USE OF ANTICOAGULANTS: Primary | ICD-10-CM

## 2023-04-27 LAB
AORTIC ROOT ANNULUS: 3.89 CM
AV INDEX (PROSTH): 0.47
AV MEAN GRADIENT: 19 MMHG
AV PEAK GRADIENT: 37 MMHG
AV VALVE AREA: 1.5 CM2
AV VELOCITY RATIO: 0.44
BSA FOR ECHO PROCEDURE: 2.24 M2
CV ECHO LV RWT: 0.55 CM
DOP CALC AO PEAK VEL: 3.04 M/S
DOP CALC AO VTI: 59.3 CM
DOP CALC LVOT AREA: 3.2 CM2
DOP CALC LVOT DIAMETER: 2.02 CM
DOP CALC LVOT PEAK VEL: 1.33 M/S
DOP CALC LVOT STROKE VOLUME: 88.73 CM3
DOP CALC MV VTI: 75.5 CM
DOP CALC RVOT PEAK VEL: 0.79 M/S
DOP CALC RVOT VTI: 17.4 CM
DOP CALCLVOT PEAK VEL VTI: 27.7 CM
E WAVE DECELERATION TIME: 181.51 MSEC
E/A RATIO: 0.79
ECHO LV POSTERIOR WALL: 1.25 CM (ref 0.6–1.1)
EJECTION FRACTION: 50 %
FRACTIONAL SHORTENING: 23 % (ref 28–44)
INR PPP: 1.7 (ref 2–3)
INTERVENTRICULAR SEPTUM: 1.45 CM (ref 0.6–1.1)
LA MAJOR: 5.73 CM
LA MINOR: 6.42 CM
LA WIDTH: 3.5 CM
LEFT ATRIUM SIZE: 4.53 CM
LEFT ATRIUM VOLUME INDEX MOD: 41 ML/M2
LEFT ATRIUM VOLUME INDEX: 36.6 ML/M2
LEFT ATRIUM VOLUME MOD: 91.32 CM3
LEFT ATRIUM VOLUME: 81.61 CM3
LEFT INTERNAL DIMENSION IN SYSTOLE: 3.52 CM (ref 2.1–4)
LEFT VENTRICLE DIASTOLIC VOLUME INDEX: 43.05 ML/M2
LEFT VENTRICLE DIASTOLIC VOLUME: 96 ML
LEFT VENTRICLE MASS INDEX: 108 G/M2
LEFT VENTRICLE SYSTOLIC VOLUME INDEX: 23.1 ML/M2
LEFT VENTRICLE SYSTOLIC VOLUME: 51.59 ML
LEFT VENTRICULAR INTERNAL DIMENSION IN DIASTOLE: 4.57 CM (ref 3.5–6)
LEFT VENTRICULAR MASS: 240.88 G
LV LATERAL E/E' RATIO: 19.5 M/S
LVOT MG: 4.38 MMHG
LVOT MV: 1.01 CM/S
MV MEAN GRADIENT: 11 MMHG
MV PEAK A VEL: 1.97 M/S
MV PEAK E VEL: 1.56 M/S
MV PEAK GRADIENT: 18 MMHG
MV STENOSIS PRESSURE HALF TIME: 62.46 MS
MV VALVE AREA BY CONTINUITY EQUATION: 1.18 CM2
MV VALVE AREA P 1/2 METHOD: 3.52 CM2
PISA TR MAX VEL: 3.33 M/S
PV MEAN GRADIENT: 1.15 MMHG
PV MV: 0.77 M/S
PV PEAK VELOCITY: 1.25 CM/S
RA MAJOR: 4.46 CM
RA PRESSURE: 3 MMHG
RA WIDTH: 3.59 CM
RIGHT VENTRICULAR END-DIASTOLIC DIMENSION: 2.69 CM
SINUS: 2.73 CM
STJ: 2.84 CM
TDI LATERAL: 0.08 M/S
TR MAX PG: 44 MMHG
TV REST PULMONARY ARTERY PRESSURE: 47 MMHG

## 2023-04-27 PROCEDURE — 93306 ECHO (CUPID ONLY): ICD-10-PCS | Mod: 26,,, | Performed by: STUDENT IN AN ORGANIZED HEALTH CARE EDUCATION/TRAINING PROGRAM

## 2023-04-27 PROCEDURE — 99999 PR PBB SHADOW E&M-EST. PATIENT-LVL V: CPT | Mod: PBBFAC,,, | Performed by: UROLOGY

## 2023-04-27 PROCEDURE — 93793 ANTICOAG MGMT PT WARFARIN: CPT | Mod: ,,,

## 2023-04-27 PROCEDURE — 93793 PR ANTICOAGULANT MGMT FOR PT TAKING WARFARIN: ICD-10-PCS | Mod: ,,,

## 2023-04-27 PROCEDURE — 85610 PROTHROMBIN TIME: CPT | Mod: PBBFAC

## 2023-04-27 PROCEDURE — 99214 PR OFFICE/OUTPT VISIT, EST, LEVL IV, 30-39 MIN: ICD-10-PCS | Mod: S$PBB,,, | Performed by: UROLOGY

## 2023-04-27 PROCEDURE — 93306 TTE W/DOPPLER COMPLETE: CPT

## 2023-04-27 PROCEDURE — 99214 OFFICE O/P EST MOD 30 MIN: CPT | Mod: S$PBB,,, | Performed by: UROLOGY

## 2023-04-27 PROCEDURE — 93306 TTE W/DOPPLER COMPLETE: CPT | Mod: 26,,, | Performed by: STUDENT IN AN ORGANIZED HEALTH CARE EDUCATION/TRAINING PROGRAM

## 2023-04-27 PROCEDURE — 99999 PR PBB SHADOW E&M-EST. PATIENT-LVL V: ICD-10-PCS | Mod: PBBFAC,,, | Performed by: UROLOGY

## 2023-04-27 PROCEDURE — 99215 OFFICE O/P EST HI 40 MIN: CPT | Mod: PBBFAC,25 | Performed by: UROLOGY

## 2023-04-27 NOTE — PROGRESS NOTES
Chief Complaint:   Encounter Diagnoses   Name Primary?    Gross hematuria Yes    Renal cyst     Erectile dysfunction, unspecified erectile dysfunction type          HPI:   4/27/23- reports back today to discuss erectile dysfunction, Cialis is not working the Viagra is not sufficient.  Patient does not urinate, currently on dialysis, no recurrent hematuria.    2/28/19: 48 yo man voids about once a day since he is a MWF dialysis pt.  Saw a clot and gross hematuria about 2-3 weeks ago on one occasion.  No abd/pelvic pain and no exac/rel factors.  No urolithiasis.  Weak stream.  No  history.  Normal sexual function until recently.  Had a CT Urogram that shows no abnormalities except renal cystic function consistent with ESRD.    Allergies:  Patient has no known allergies.    Medications:  has a current medication list which includes the following prescription(s): albuterol, amlodipine, amoxicillin-clavulanate 500-125mg, ascorbic acid (vitamin c), b complex vitamins, calcium acetate(phosphat bind), calcium acetate(phosphat bind), calcium acetate(phosphat bind), collagen (bovine), dialysis solutions, diltiazem, epoetin vikki, ferrous sulfate, fish oil-omega-3 fatty acids, hydralazine, hydrochlorothiazide, hydroxyzine hcl, hydroxyzine pamoate, labetalol, labetalol, labetalol, lisinopril, multivitamin with minerals, omega-3 fatty acids-vitamin e, pantoprazole, pantoprazole, shawna-nataly rx, sildenafil, tenofovir disoproxil fumarate, triamcinolone acetonide 0.1%, shawna-nataly rx, vitamin d, warfarin, and warfarin, and the following Facility-Administered Medications: sodium chloride 0.9% and sodium chloride 0.9%.    Review of Systems:  General: No fever, chills, fatigability, or weight loss.  Skin: No rashes, itching, or changes in color or texture of skin.  Chest: Denies HAY, cyanosis, wheezing, cough, and sputum production.  Abdomen: Appetite fine. No weight loss. Denies diarrhea, abdominal pain, hematemesis, or blood in  stool.  Musculoskeletal: No joint stiffness or swelling. Denies back pain.  : As above.  All other review of systems negative.    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    PSH:   has a past surgical history that includes ASD repair; Cardiac valuve replacement (02/08/2017); Radiofrequency ablation (03/13/2017); Cardiac catheterization; AV Graft Creation (Left, 03/2017); Colonoscopy (N/A, 8/27/2019); Esophagogastroduodenoscopy (N/A, 8/27/2019); Removal of graft (Left, 7/2/2020); Colonoscopy (N/A, 9/3/2020); and Right heart catheterization (Right, 8/12/2021).    FamHx: family history includes Anesthesia problems in his paternal uncle; Cancer in his mother; Diabetes in his paternal aunt and paternal aunt; Heart attack in his father; Heart failure in his paternal grandmother; Hypertension in his paternal aunt; Leukemia in his mother and paternal aunt; No Known Problems in his brother, sister, sister, and sister; Stroke in his paternal aunt; Suicide in his paternal uncle; Valvular heart disease in his maternal aunt.    SocHx:  reports that he has never smoked. He has never used smokeless tobacco. He reports that he does not drink alcohol and does not use drugs.      Physical Exam:  There were no vitals filed for this visit.    General: A&Ox3, no apparent distress, no deformities  Neck: No masses, normal thyroid  Lungs: normal inspiration, no use of accessory muscles  Heart: normal pulse, no arrhythmias  Abdomen: Soft, NT, ND  Skin: The skin is warm and dry. No  jaundice.  Ext: No c/c/e.     Labs/Studies:   Cysto negative 7/20  DARLENE bilateral complex renal cysts 2/22  CT urogram hyperdense cysts, no solid lesions 7/21  PSA 0.38 11/20    Impression/Plan:      1. Gross hematuria- no recurrent hematuria, no history of smoking.  Previous structural evaluation was negative by my partner.  Patient does not void.    2. Renal cyst- appeared to be benign, no further workup warranted.    3. Erectile dysfunction- cialis 20 mg does not work and viagra 100 mg is not sufficient.  We discussed other options today including IPP and TriMix in some detail.  Patient would like to pursue TriMix before pursuing an IPP.  Therefore will bring him in for a TriMix trial.  He understands the risk of Peyronie's and priapism.

## 2023-04-27 NOTE — PROGRESS NOTES
Patient's INR is sub-therapeutic at 1.7.  Patient reports he followed previous instructions.  Patient reports he has been eating pistachios frequently.  Advised of increased risk of clotting; signs/symptoms of clotting and need to go to ED if experiences any.  Patient reports not having any signs/symptoms of clotting.  Instructions given: Will give boosted dose of warfarin 11.25 mg today-4/27/23; then increase overall dosage to warfarin 11.25 mg on Saturdays and Tuesdays; and 7.5 mg all other days.  Recheck on 5/9/23.  Calendar reviewed with patient.  Patient verbalizes understanding.

## 2023-05-02 ENCOUNTER — PATIENT MESSAGE (OUTPATIENT)
Dept: PHARMACY | Facility: CLINIC | Age: 53
End: 2023-05-02
Payer: MEDICARE

## 2023-05-09 ENCOUNTER — ANTI-COAG VISIT (OUTPATIENT)
Dept: CARDIOLOGY | Facility: CLINIC | Age: 53
End: 2023-05-09
Payer: MEDICARE

## 2023-05-09 DIAGNOSIS — Z95.2 HISTORY OF MECHANICAL AORTIC VALVE REPLACEMENT: ICD-10-CM

## 2023-05-09 DIAGNOSIS — Z79.01 ANTICOAGULATED: ICD-10-CM

## 2023-05-09 DIAGNOSIS — Z79.01 LONG TERM (CURRENT) USE OF ANTICOAGULANTS: Primary | ICD-10-CM

## 2023-05-09 LAB — INR PPP: 1.9 (ref 2–3)

## 2023-05-09 PROCEDURE — 93793 PR ANTICOAGULANT MGMT FOR PT TAKING WARFARIN: ICD-10-PCS | Mod: ,,,

## 2023-05-09 PROCEDURE — 85610 PROTHROMBIN TIME: CPT | Mod: PBBFAC

## 2023-05-09 PROCEDURE — 93793 ANTICOAG MGMT PT WARFARIN: CPT | Mod: ,,,

## 2023-05-09 NOTE — PROGRESS NOTES
Patient's INR is sub-therapeutic at 1.9.  Patient reports followed previous instructions.  Patient reports no changes.  Advised of increased risk of clotting; signs/symptoms of clotting and need to go to ED if experiences any.  Patient reports not having any signs/symptoms of clotting.  Instructions given: Will give boosted dose of warfarin 11.25 mg today-5/9/23; then resume warfarin 11.25 mg on Saturdays and Tuesdays; and 7.5 mg all other days.  Recheck in one week on 5/16/23.  Calendar reviewed with patient.  Patient verbalizes understanding.

## 2023-05-11 ENCOUNTER — OFFICE VISIT (OUTPATIENT)
Dept: UROLOGY | Facility: CLINIC | Age: 53
End: 2023-05-11
Payer: MEDICARE

## 2023-05-11 VITALS
WEIGHT: 216.06 LBS | SYSTOLIC BLOOD PRESSURE: 112 MMHG | DIASTOLIC BLOOD PRESSURE: 73 MMHG | HEIGHT: 74 IN | BODY MASS INDEX: 27.73 KG/M2 | RESPIRATION RATE: 20 BRPM | HEART RATE: 89 BPM

## 2023-05-11 DIAGNOSIS — R31.0 GROSS HEMATURIA: Primary | ICD-10-CM

## 2023-05-11 DIAGNOSIS — N28.1 RENAL CYST: ICD-10-CM

## 2023-05-11 DIAGNOSIS — N52.9 ERECTILE DYSFUNCTION, UNSPECIFIED ERECTILE DYSFUNCTION TYPE: ICD-10-CM

## 2023-05-11 PROCEDURE — 54235 NJX CORPORA CAVERNOSA RX AGT: CPT | Mod: PBBFAC | Performed by: UROLOGY

## 2023-05-11 PROCEDURE — 54235 NJX CORPORA CAVERNOSA RX AGT: CPT | Mod: S$PBB,,, | Performed by: UROLOGY

## 2023-05-11 PROCEDURE — 99999 PR PBB SHADOW E&M-EST. PATIENT-LVL IV: ICD-10-PCS | Mod: PBBFAC,,, | Performed by: UROLOGY

## 2023-05-11 PROCEDURE — 99214 OFFICE O/P EST MOD 30 MIN: CPT | Mod: PBBFAC,25 | Performed by: UROLOGY

## 2023-05-11 PROCEDURE — 99999 PR PBB SHADOW E&M-EST. PATIENT-LVL IV: CPT | Mod: PBBFAC,,, | Performed by: UROLOGY

## 2023-05-11 PROCEDURE — 54235 PR INJECT CORPORA CAVERN,PHARM AGNT: ICD-10-PCS | Mod: S$PBB,,, | Performed by: UROLOGY

## 2023-05-11 PROCEDURE — 99213 PR OFFICE/OUTPT VISIT, EST, LEVL III, 20-29 MIN: ICD-10-PCS | Mod: S$PBB,25,, | Performed by: UROLOGY

## 2023-05-11 PROCEDURE — 99213 OFFICE O/P EST LOW 20 MIN: CPT | Mod: S$PBB,25,, | Performed by: UROLOGY

## 2023-05-11 NOTE — PROGRESS NOTES
Chief Complaint:   Encounter Diagnoses   Name Primary?    Gross hematuria Yes    Renal cyst     Erectile dysfunction, unspecified erectile dysfunction type          HPI:   5/11/23- patient is here today for a TriMix trial.      2/28/19: 48 yo man voids about once a day since he is a MWF dialysis pt.  Saw a clot and gross hematuria about 2-3 weeks ago on one occasion.  No abd/pelvic pain and no exac/rel factors.  No urolithiasis.  Weak stream.  No  history.  Normal sexual function until recently.  Had a CT Urogram that shows no abnormalities except renal cystic function consistent with ESRD.    Allergies:  Patient has no known allergies.    Medications:  has a current medication list which includes the following prescription(s): amlodipine, amoxicillin-clavulanate 500-125mg, ascorbic acid (vitamin c), b complex vitamins, calcium acetate(phosphat bind), calcium acetate(phosphat bind), calcium acetate(phosphat bind), collagen (bovine), dialysis solutions, diltiazem, epoetin vikki, ferrous sulfate, fish oil-omega-3 fatty acids, hydralazine, hydrochlorothiazide, hydroxyzine hcl, hydroxyzine pamoate, labetalol, labetalol, labetalol, lisinopril, multivitamin with minerals, pantoprazole, pantoprazole, alprostadil, shawna-nataly rx, sildenafil, tenofovir disoproxil fumarate, triamcinolone acetonide 0.1%, shawna-nataly rx, vitamin d, warfarin, warfarin, albuterol, and omega-3 fatty acids-vitamin e, and the following Facility-Administered Medications: sodium chloride 0.9% and sodium chloride 0.9%.    Review of Systems:  General: No fever, chills, fatigability, or weight loss.  Skin: No rashes, itching, or changes in color or texture of skin.  Chest: Denies HAY, cyanosis, wheezing, cough, and sputum production.  Abdomen: Appetite fine. No weight loss. Denies diarrhea, abdominal pain, hematemesis, or blood in stool.  Musculoskeletal: No joint stiffness or swelling. Denies back pain.  : As above.  All other review of systems  negative.    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    PSH:   has a past surgical history that includes ASD repair; Cardiac valuve replacement (02/08/2017); Radiofrequency ablation (03/13/2017); Cardiac catheterization; AV Graft Creation (Left, 03/2017); Colonoscopy (N/A, 8/27/2019); Esophagogastroduodenoscopy (N/A, 8/27/2019); Removal of graft (Left, 7/2/2020); Colonoscopy (N/A, 9/3/2020); and Right heart catheterization (Right, 8/12/2021).    FamHx: family history includes Anesthesia problems in his paternal uncle; Cancer in his mother; Diabetes in his paternal aunt and paternal aunt; Heart attack in his father; Heart failure in his paternal grandmother; Hypertension in his paternal aunt; Leukemia in his mother and paternal aunt; No Known Problems in his brother, sister, sister, and sister; Stroke in his paternal aunt; Suicide in his paternal uncle; Valvular heart disease in his maternal aunt.    SocHx:  reports that he has never smoked. He has never used smokeless tobacco. He reports that he does not drink alcohol and does not use drugs.      Physical Exam:  Vitals:    05/11/23 0840   BP: 112/73   Pulse: 89   Resp: 20       General: A&Ox3, no apparent distress, no deformities  Neck: No masses, normal thyroid  Lungs: normal inspiration, no use of accessory muscles  Heart: normal pulse, no arrhythmias  Abdomen: Soft, NT, ND  Skin: The skin is warm and dry. No jaundice.  Ext: No c/c/e.   : 5/23- Test desc felicita, no abnormalities of epididymus. Normal penile and  scrotal skin. Meatus normal.    Labs/Studies:   Cysto negative 7/20  DARLENE bilateral complex renal cysts 2/22  CT urogram hyperdense cysts, no solid lesions 7/21  PSA 0.38 11/20    Procedure: patient was sterilely prepped, and then instructed him on how to do injections at approximately the 10 and 2 positions at the base.  We also informed him to alternate sides and keep it at the base, so as to reduce Peyronie's.  Patient demonstrated technique appropriately, appears to be competent in technique.  Patient tolerated the procedure well, 0.25 mL appears to work well for the patient.      Impression/Plan:       1. Gross hematuria- no recurrent hematuria, no history of smoking.  Previous structural evaluation was negative by my partner.  Patient does not void.    2. Renal cyst- appeared to be benign, no further workup warranted.    3. Erectile dysfunction- cialis 20 mg does not work and viagra 100 mg is not sufficient.  Patient tolerated the TriMix trial well today, 0.25-0.3 mL appears to be the appropriate dose.  We have instructed him appropriately to avoid Peyronie's and priapism, patient agrees and feels confident and using the medication.  Will contact him in 4 hours to make sure that his erection has resolved.  Call with any complaints, otherwise see me in 3 months and proceed as appropriate from there.

## 2023-05-16 ENCOUNTER — ANTI-COAG VISIT (OUTPATIENT)
Dept: CARDIOLOGY | Facility: CLINIC | Age: 53
End: 2023-05-16
Payer: MEDICARE

## 2023-05-16 DIAGNOSIS — Z79.01 ANTICOAGULATED: ICD-10-CM

## 2023-05-16 DIAGNOSIS — Z79.01 LONG TERM (CURRENT) USE OF ANTICOAGULANTS: Primary | ICD-10-CM

## 2023-05-16 DIAGNOSIS — Z95.2 HISTORY OF MECHANICAL AORTIC VALVE REPLACEMENT: ICD-10-CM

## 2023-05-16 LAB — INR PPP: 1.8 (ref 2–3)

## 2023-05-16 PROCEDURE — 93793 ANTICOAG MGMT PT WARFARIN: CPT | Mod: ,,,

## 2023-05-16 PROCEDURE — 93793 PR ANTICOAGULANT MGMT FOR PT TAKING WARFARIN: ICD-10-PCS | Mod: ,,,

## 2023-05-16 PROCEDURE — 85610 PROTHROMBIN TIME: CPT | Mod: PBBFAC

## 2023-05-16 NOTE — PROGRESS NOTES
INR not at goal. Medications, chart, and patient findings reviewed. See calendar for adjustments to dose and follow up plan.  Boost and increase dose for continued low INRs.  Would like to repeat INR at end of week, cannot return due to HD.  Patient confirms he is taking the correct dose, we have made several dose increases and boosts over the past few weeks, needs close follow-up.

## 2023-05-16 NOTE — PROGRESS NOTES
Patient's INR is sub-therapeutic at 1.8.  Patient reports followed previous instructions.  Patient reports had injection of TriMix Trial on 511/23.  Advised of increased risk of clotting; signs/symptoms of clotting and need to go to ED if experiences any.  Patient reports not having any signs/symptoms of clotting.  Sent to PharmD for dosing/instructions.

## 2023-05-22 RX ORDER — DILTIAZEM HYDROCHLORIDE 420 MG/1
420 CAPSULE, EXTENDED RELEASE ORAL DAILY
Qty: 30 CAPSULE | Refills: 3 | Status: SHIPPED | OUTPATIENT
Start: 2023-05-22 | End: 2023-10-12 | Stop reason: SDUPTHER

## 2023-05-23 ENCOUNTER — ANTI-COAG VISIT (OUTPATIENT)
Dept: CARDIOLOGY | Facility: CLINIC | Age: 53
End: 2023-05-23
Payer: MEDICARE

## 2023-05-23 DIAGNOSIS — Z79.01 ANTICOAGULATED: Primary | ICD-10-CM

## 2023-05-23 DIAGNOSIS — I48.91 ATRIAL FIBRILLATION, UNSPECIFIED TYPE: ICD-10-CM

## 2023-05-23 DIAGNOSIS — Z95.2 HISTORY OF MECHANICAL AORTIC VALVE REPLACEMENT: ICD-10-CM

## 2023-05-23 LAB — INR PPP: 2.6 (ref 2–3)

## 2023-05-23 PROCEDURE — 85610 PROTHROMBIN TIME: CPT | Mod: PBBFAC

## 2023-05-23 PROCEDURE — 93793 ANTICOAG MGMT PT WARFARIN: CPT | Mod: ,,,

## 2023-05-23 PROCEDURE — 93793 PR ANTICOAGULANT MGMT FOR PT TAKING WARFARIN: ICD-10-PCS | Mod: ,,,

## 2023-05-23 NOTE — PROGRESS NOTES
"INR is therapeutic at 2.6.  Previous warfarin instructions were verified and followed.  Patient reports, "I'm trying to keep my diet consistent".  Instructions given to continue 11.25 mg every Tuesday, Thursday, Saturday; and 7.5 mg on all other days as directed.  Keep with an consistent diet.  Follow up in 2 weeks.  Dose calendar given and reviewed with patient - confirms understanding.    "

## 2023-06-12 ENCOUNTER — ANTI-COAG VISIT (OUTPATIENT)
Dept: CARDIOLOGY | Facility: CLINIC | Age: 53
End: 2023-06-12
Payer: MEDICARE

## 2023-06-12 DIAGNOSIS — Z79.01 LONG TERM (CURRENT) USE OF ANTICOAGULANTS: Primary | ICD-10-CM

## 2023-06-12 DIAGNOSIS — Z95.2 HISTORY OF MECHANICAL AORTIC VALVE REPLACEMENT: ICD-10-CM

## 2023-06-12 DIAGNOSIS — Z79.01 ANTICOAGULATED: ICD-10-CM

## 2023-06-12 LAB — INR PPP: 2.2 (ref 2–3)

## 2023-06-12 PROCEDURE — 85610 PROTHROMBIN TIME: CPT | Mod: PBBFAC

## 2023-06-12 PROCEDURE — 93793 ANTICOAG MGMT PT WARFARIN: CPT | Mod: ,,,

## 2023-06-12 PROCEDURE — 93793 PR ANTICOAGULANT MGMT FOR PT TAKING WARFARIN: ICD-10-PCS | Mod: ,,,

## 2023-06-12 NOTE — PROGRESS NOTES
Patient's INR is therapeutic at 2.2.  Patient reports no changes.   Instructions given: Continue warfarin 11.25 mg on Tuesdays, Thursdays and Saturdays; and 7.5 mg all other days.  Recheck in four weeks on 7/11/23.  Calendar reviewed with patient.  Patient verbalizes understanding.

## 2023-06-19 RX ORDER — LABETALOL 200 MG/1
200 TABLET, FILM COATED ORAL 3 TIMES DAILY
Qty: 90 TABLET | Refills: 6 | OUTPATIENT
Start: 2023-06-19 | End: 2024-01-25 | Stop reason: SDUPTHER

## 2023-07-06 ENCOUNTER — TELEPHONE (OUTPATIENT)
Dept: GASTROENTEROLOGY | Facility: CLINIC | Age: 53
End: 2023-07-06
Payer: MEDICARE

## 2023-07-11 ENCOUNTER — ANTI-COAG VISIT (OUTPATIENT)
Dept: CARDIOLOGY | Facility: CLINIC | Age: 53
End: 2023-07-11
Payer: MEDICARE

## 2023-07-11 DIAGNOSIS — Z79.01 ANTICOAGULATED: ICD-10-CM

## 2023-07-11 DIAGNOSIS — Z79.01 LONG TERM (CURRENT) USE OF ANTICOAGULANTS: Primary | ICD-10-CM

## 2023-07-11 DIAGNOSIS — Z95.2 HISTORY OF MECHANICAL AORTIC VALVE REPLACEMENT: ICD-10-CM

## 2023-07-11 LAB — INR PPP: 1.5 (ref 2–3)

## 2023-07-11 PROCEDURE — 85610 PROTHROMBIN TIME: CPT | Mod: PBBFAC

## 2023-07-11 PROCEDURE — 93793 ANTICOAG MGMT PT WARFARIN: CPT | Mod: ,,,

## 2023-07-11 PROCEDURE — 93793 PR ANTICOAGULANT MGMT FOR PT TAKING WARFARIN: ICD-10-PCS | Mod: ,,,

## 2023-07-11 NOTE — PROGRESS NOTES
Patient's INR is sub-therapeutic at 1.5.  Patient reports he missed two doses.  Advised of increased risk of clotting; signs/symptoms of clotting and  need to go to ED if experiences any.  Patient reports not having any signs/symptoms of clotting.  Sent to PharmD for dosing/instructions.

## 2023-07-11 NOTE — PROGRESS NOTES
INR not at goal. Medications, chart, and patient findings reviewed. See calendar for adjustments to dose and follow up plan.  Boost dose today, try to avoid missed doses.  He maintains goal range when compliant with warfarin dose.  Repeat INR in 2 weeks.

## 2023-07-27 ENCOUNTER — ANTI-COAG VISIT (OUTPATIENT)
Dept: CARDIOLOGY | Facility: CLINIC | Age: 53
End: 2023-07-27
Payer: MEDICARE

## 2023-07-27 DIAGNOSIS — Z95.2 HISTORY OF MECHANICAL AORTIC VALVE REPLACEMENT: ICD-10-CM

## 2023-07-27 DIAGNOSIS — Z79.01 LONG TERM (CURRENT) USE OF ANTICOAGULANTS: Primary | ICD-10-CM

## 2023-07-27 DIAGNOSIS — Z79.01 ANTICOAGULATED: ICD-10-CM

## 2023-07-27 LAB — INR PPP: 1.7 (ref 2–3)

## 2023-07-27 PROCEDURE — 93793 PR ANTICOAGULANT MGMT FOR PT TAKING WARFARIN: ICD-10-PCS | Mod: ,,,

## 2023-07-27 PROCEDURE — 99999PBSHW POCT INR: Mod: PBBFAC,,,

## 2023-07-27 PROCEDURE — 99999PBSHW POCT INR: ICD-10-PCS | Mod: PBBFAC,,,

## 2023-07-27 PROCEDURE — 93793 ANTICOAG MGMT PT WARFARIN: CPT | Mod: ,,,

## 2023-07-27 PROCEDURE — 85610 PROTHROMBIN TIME: CPT | Mod: PBBFAC

## 2023-07-27 NOTE — PROGRESS NOTES
Chart reviewed, agree with LPN plan.       Spoke with pt informing him that we have received hand clinic referral from pcp. Pt stated he does not want to be seen at [facility]. Would prefer [facility] instead.

## 2023-07-27 NOTE — PROGRESS NOTES
Patient's INR is sub-therapeutic at 1.7.   Patient reports he followed previous instructions.    Patient reports no changes.  Advised patient of increased risk of clotting; signs/symptoms of clotting and need to go to ED if experiences any.  Patient reports he is not having any signs/symptoms of clotting.   Instructions given: Will give increased dose of warfarin 15 mg today-7/27/23; then increase overall dosage of warfarin 7.5 mg on Fridays, Mondays and Wednesdays; and 11.25 mg all other days.  Recheck on 8/3/23.  Calendar reviewed with patient.  Patient verbalizes understanding.

## 2023-08-02 RX ORDER — PANTOPRAZOLE SODIUM 40 MG/1
TABLET, DELAYED RELEASE ORAL
Qty: 30 TABLET | Refills: 6 | Status: CANCELLED | OUTPATIENT
Start: 2023-08-02

## 2023-08-04 NOTE — TELEPHONE ENCOUNTER
----- Message from Aishwarya Johnson LPN sent at 1/9/2019 11:18 AM CST -----  Olamide Burns. Dr. Bass recommends that patient follow up with [facility] due to his appointment availability.   Thank you,  Aishwarya  ----- Message -----  From: Olamide Roberto LPN  Sent: 1/9/2019   8:45 AM  To: [facility] Staff    Unable to schedule patient for consult until 2020. Please schedule for [facility]. Thanks    
Done, slime   
Please do an external referral.  
[phone]

## 2023-08-08 ENCOUNTER — ANTI-COAG VISIT (OUTPATIENT)
Dept: CARDIOLOGY | Facility: CLINIC | Age: 53
End: 2023-08-08
Payer: MEDICARE

## 2023-08-08 DIAGNOSIS — Z95.2 HISTORY OF MECHANICAL AORTIC VALVE REPLACEMENT: ICD-10-CM

## 2023-08-08 DIAGNOSIS — Z79.01 ANTICOAGULATED: ICD-10-CM

## 2023-08-08 DIAGNOSIS — Z79.01 LONG TERM (CURRENT) USE OF ANTICOAGULANTS: Primary | ICD-10-CM

## 2023-08-08 LAB — INR PPP: 2.1 (ref 2–3)

## 2023-08-08 PROCEDURE — 93793 ANTICOAG MGMT PT WARFARIN: CPT | Mod: ,,,

## 2023-08-08 PROCEDURE — 99999PBSHW POCT INR: ICD-10-PCS | Mod: PBBFAC,,,

## 2023-08-08 PROCEDURE — 93793 PR ANTICOAGULANT MGMT FOR PT TAKING WARFARIN: ICD-10-PCS | Mod: ,,,

## 2023-08-08 PROCEDURE — 99999PBSHW POCT INR: Mod: PBBFAC,,,

## 2023-08-08 PROCEDURE — 85610 PROTHROMBIN TIME: CPT | Mod: PBBFAC

## 2023-08-08 NOTE — PROGRESS NOTES
Patient's INR is therapeutic at 2.1.  Patient reports he followed previous instructions.  Patient reports no changes.  Instructions given: Continue warfarin 7.5 mg on Wednesdays, Fridays and Mondays; and 11.25 mg all other days.  Recheck on 8/22/23 with other appointment.  Calendar reviewed with patient.  Patient verbalizes understanding.

## 2023-08-22 ENCOUNTER — HOSPITAL ENCOUNTER (OUTPATIENT)
Dept: RADIOLOGY | Facility: HOSPITAL | Age: 53
Discharge: HOME | End: 2023-08-22
Attending: NURSE PRACTITIONER
Payer: MEDICARE

## 2023-08-22 ENCOUNTER — ANTI-COAG VISIT (OUTPATIENT)
Dept: CARDIOLOGY | Facility: CLINIC | Age: 53
End: 2023-08-22
Payer: MEDICARE

## 2023-08-22 DIAGNOSIS — Z79.01 ANTICOAGULATED: Primary | ICD-10-CM

## 2023-08-22 DIAGNOSIS — K74.60 CHRONIC HEPATITIS B WITH CIRRHOSIS: ICD-10-CM

## 2023-08-22 DIAGNOSIS — K74.60 HEPATIC CIRRHOSIS, UNSPECIFIED HEPATIC CIRRHOSIS TYPE, UNSPECIFIED WHETHER ASCITES PRESENT: ICD-10-CM

## 2023-08-22 DIAGNOSIS — B18.1 CHRONIC HEPATITIS B WITH CIRRHOSIS: ICD-10-CM

## 2023-08-22 DIAGNOSIS — Z95.2 HISTORY OF MECHANICAL AORTIC VALVE REPLACEMENT: ICD-10-CM

## 2023-08-22 PROCEDURE — 93793 ANTICOAG MGMT PT WARFARIN: CPT | Mod: ,,,

## 2023-08-22 PROCEDURE — 76705 ECHO EXAM OF ABDOMEN: CPT | Mod: 26,,, | Performed by: RADIOLOGY

## 2023-08-22 PROCEDURE — 93793 PR ANTICOAGULANT MGMT FOR PT TAKING WARFARIN: ICD-10-PCS | Mod: ,,,

## 2023-08-22 PROCEDURE — 76705 US ABDOMEN LIMITED: ICD-10-PCS | Mod: 26,,, | Performed by: RADIOLOGY

## 2023-08-22 PROCEDURE — 76705 ECHO EXAM OF ABDOMEN: CPT | Mod: TC

## 2023-08-22 NOTE — PROGRESS NOTES
INR not at goal. Medications, chart, and patient findings reviewed. See calendar for adjustments to dose and follow up plan.  Pt reports increased consumption of greens and peanut butter.  Would like to keep these foods in his diet.  He will increase his weekly dose while keeping these foods consistent.  Reviewed the effects on Vit K on INR.  He will boost today with 15 mg for low INR.  Repeat INR in 2 weeks at [facility].  Reviewed dosing a second time, verbalizes understanding.

## 2023-08-24 ENCOUNTER — OFFICE VISIT (OUTPATIENT)
Dept: UROLOGY | Facility: CLINIC | Age: 53
End: 2023-08-24
Payer: MEDICARE

## 2023-08-24 ENCOUNTER — LAB VISIT (OUTPATIENT)
Dept: LAB | Facility: HOSPITAL | Age: 53
End: 2023-08-24
Attending: UROLOGY
Payer: MEDICARE

## 2023-08-24 VITALS
RESPIRATION RATE: 16 BRPM | HEIGHT: 74 IN | BODY MASS INDEX: 27.73 KG/M2 | DIASTOLIC BLOOD PRESSURE: 63 MMHG | WEIGHT: 216.06 LBS | SYSTOLIC BLOOD PRESSURE: 106 MMHG | HEART RATE: 78 BPM

## 2023-08-24 DIAGNOSIS — R31.0 GROSS HEMATURIA: ICD-10-CM

## 2023-08-24 DIAGNOSIS — N28.1 RENAL CYST: ICD-10-CM

## 2023-08-24 DIAGNOSIS — Z01.818 PRE-OP TESTING: ICD-10-CM

## 2023-08-24 DIAGNOSIS — N52.9 ERECTILE DYSFUNCTION, UNSPECIFIED ERECTILE DYSFUNCTION TYPE: Primary | ICD-10-CM

## 2023-08-24 PROCEDURE — 99999 PR PBB SHADOW E&M-EST. PATIENT-LVL IV: CPT | Mod: PBBFAC,,, | Performed by: UROLOGY

## 2023-08-24 PROCEDURE — 84153 ASSAY OF PSA TOTAL: CPT | Performed by: UROLOGY

## 2023-08-24 PROCEDURE — 99214 OFFICE O/P EST MOD 30 MIN: CPT | Mod: S$PBB,,, | Performed by: UROLOGY

## 2023-08-24 PROCEDURE — 99214 OFFICE O/P EST MOD 30 MIN: CPT | Mod: PBBFAC | Performed by: UROLOGY

## 2023-08-24 PROCEDURE — 99999 PR PBB SHADOW E&M-EST. PATIENT-LVL IV: ICD-10-PCS | Mod: PBBFAC,,, | Performed by: UROLOGY

## 2023-08-24 PROCEDURE — 99214 PR OFFICE/OUTPT VISIT, EST, LEVL IV, 30-39 MIN: ICD-10-PCS | Mod: S$PBB,,, | Performed by: UROLOGY

## 2023-08-24 PROCEDURE — 36415 COLL VENOUS BLD VENIPUNCTURE: CPT | Performed by: UROLOGY

## 2023-08-24 NOTE — PROGRESS NOTES
.Surgery scheduled for 10/17/2023  Pre-op instructions and appointments were gone over both verbally and written.   Patient voiced understanding.    Debra Jacinto LPN

## 2023-08-24 NOTE — PROGRESS NOTES
Chief Complaint:   Encounter Diagnoses   Name Primary?    Gross hematuria Yes    Renal cyst     Erectile dysfunction, unspecified erectile dysfunction type          HPI:   8/24/23- no hematuria and no voiding while on dialysis.  Medications are not consistent and he is now considering surgical management.       2/28/19: 50 yo man voids about once a day since he is a MWF dialysis pt.  Saw a clot and gross hematuria about 2-3 weeks ago on one occasion.  No abd/pelvic pain and no exac/rel factors.  No urolithiasis.  Weak stream.  No  history.  Normal sexual function until recently.  Had a CT Urogram that shows no abnormalities except renal cystic function consistent with ESRD.    Allergies:  Patient has no known allergies.    Medications:  has a current medication list which includes the following prescription(s): albuterol, amlodipine, amoxicillin-clavulanate 500-125mg, ascorbic acid (vitamin c), b complex vitamins, calcium acetate(phosphat bind), calcium acetate(phosphat bind), calcium acetate(phosphat bind), collagen (bovine), dialysis solutions, diltiazem, epoetin vikki, ferrous sulfate, fish oil-omega-3 fatty acids, hydralazine, hydrochlorothiazide, hydroxyzine hcl, hydroxyzine pamoate, labetalol, labetalol, labetalol, lisinopril, multivitamin with minerals, omega-3 fatty acids-vitamin e, pantoprazole, pantoprazole, pantoprazole, alprostadil, shawna-nataly rx, sildenafil, tenofovir disoproxil fumarate, triamcinolone acetonide 0.1%, shawna-nataly rx, vitamin d, warfarin, and warfarin, and the following Facility-Administered Medications: sodium chloride 0.9% and sodium chloride 0.9%.    Review of Systems:  General: No fever, chills, fatigability, or weight loss.  Skin: No rashes, itching, or changes in color or texture of skin.  Chest: Denies HAY, cyanosis, wheezing, cough, and sputum production.  Abdomen: Appetite fine. No weight loss. Denies diarrhea, abdominal pain, hematemesis, or blood in stool.  Musculoskeletal: No  joint stiffness or swelling. Denies back pain.  : As above.  All other review of systems negative.    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    PSH:   has a past surgical history that includes ASD repair; Cardiac valuve replacement (02/08/2017); Radiofrequency ablation (03/13/2017); Cardiac catheterization; AV Graft Creation (Left, 03/2017); Colonoscopy (N/A, 8/27/2019); Esophagogastroduodenoscopy (N/A, 8/27/2019); Removal of graft (Left, 7/2/2020); Colonoscopy (N/A, 9/3/2020); and Right heart catheterization (Right, 8/12/2021).    FamHx: family history includes Anesthesia problems in his paternal uncle; Cancer in his mother; Diabetes in his paternal aunt and paternal aunt; Heart attack in his father; Heart failure in his paternal grandmother; Hypertension in his paternal aunt; Leukemia in his mother and paternal aunt; No Known Problems in his brother, sister, sister, and sister; Stroke in his paternal aunt; Suicide in his paternal uncle; Valvular heart disease in his maternal aunt.    SocHx:  reports that he has never smoked. He has never used smokeless tobacco. He reports that he does not drink alcohol and does not use drugs.      Physical Exam:  Vitals:    08/24/23 1515   BP: 106/63   Pulse: 78   Resp: 16       General: A&Ox3, no apparent distress, no deformities  Neck: No masses, normal thyroid  Lungs: normal inspiration, no use of accessory muscles  Heart: normal pulse, no arrhythmias  Abdomen: Soft, NT, ND  Skin: The skin is warm and dry. No jaundice.  Ext:  No c/c/e.   : 5/23- Test desc felicita, no abnormalities of epididymus. Normal penile and scrotal skin. Meatus normal.    Labs/Studies:   Cysto negative 7/20  DARLENE bilateral complex renal cysts 2/22  CT urogram hyperdense cysts, no solid lesions 7/21  PSA 0.38 11/20    Impression/Plan:       1. Gross hematuria- no recurrent hematuria, no history of smoking.  Previous structural evaluation was negative by my partner.  Patient does not void.    2. Renal cyst- appeared to be benign, no further workup warranted.    3. Erectile dysfunction- cialis 20 mg, viagra 100 mg and now TriMix is not sufficient, using up to 0.4 ml.  Patient would like to proceed with insertion of inflatable penile prosthesis, will get clearance.    Patient understands the risks, benefits and alternatives of the above-stated procedure.  These include but are not limited to damage to the surrounding structures including the testicles, cord structures, urethra, which would require aborting of the procedure and closure with a delayed procedure in the future.  Damage to the bowel, bladder or vascular structures requiring open procedures.  Possibility for delayed catheterization or stent placement.  Possible risk of difficulty voiding postprocedure, and requiring a Caldwell catheter.  The risk of possible urethral stricture in the future or erosion.  Risk of acute infection, requiring immediate removal.  The risk of this not working as appropriately as he would like, or having cosmetic results that are not pleasing to the patient.  The risk of significant swelling, bruising or discomfort and pain.  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  Risk of heart attack, stroke, death, DVT and PE.  Patient understanding of all the above has elected to proceed with the procedure as  stated.

## 2023-08-25 LAB — COMPLEXED PSA SERPL-MCNC: 0.55 NG/ML (ref 0–4)

## 2023-09-07 ENCOUNTER — ANTI-COAG VISIT (OUTPATIENT)
Dept: CARDIOLOGY | Facility: CLINIC | Age: 53
End: 2023-09-07
Payer: MEDICARE

## 2023-09-07 DIAGNOSIS — Z79.01 ANTICOAGULATED: ICD-10-CM

## 2023-09-07 DIAGNOSIS — Z79.01 LONG TERM (CURRENT) USE OF ANTICOAGULANTS: Primary | ICD-10-CM

## 2023-09-07 DIAGNOSIS — Z95.2 HISTORY OF MECHANICAL AORTIC VALVE REPLACEMENT: ICD-10-CM

## 2023-09-07 LAB — INR PPP: 1.8 (ref 2–3)

## 2023-09-07 PROCEDURE — 85610 PROTHROMBIN TIME: CPT | Mod: PBBFAC

## 2023-09-07 PROCEDURE — 93793 PR ANTICOAGULANT MGMT FOR PT TAKING WARFARIN: ICD-10-PCS | Mod: ,,,

## 2023-09-07 PROCEDURE — 99999PBSHW POCT INR: Mod: PBBFAC,,,

## 2023-09-07 PROCEDURE — 93793 ANTICOAG MGMT PT WARFARIN: CPT | Mod: ,,,

## 2023-09-07 PROCEDURE — 99999PBSHW POCT INR: ICD-10-PCS | Mod: PBBFAC,,,

## 2023-09-07 NOTE — PROGRESS NOTES
Patient's INR is sub-therapeutic at 1.8.  Patient reports he may have eaten more peanut butter than usual and he is unsure but thinks he may have missed dose.  Advised patient importance of keeping diet consistent and taking medication as instructed.  Advised of increased risk of clotting; signs/symptoms of clotting and need to go to ED if he experiences any.  Patient is scheduled to have surgery ob 10/17/23 with Dr. Shirley, Urologist.  Patient states he was advised by  he will need to be off of warfarin and bridged.  Patient has appointment with Dr. Gudino, Cardiologist on 10/11/23 for pre-op clearance . Patient will contact Dr. Gudino's office to request sooner appointment for clearance.  Instructions given: Will give boosted dose of warfarin 15 mg today-9/7/23; then resume warfarin 7.5 mg on Fridays and Mondays; and 11.25 mg all other days.  Recheck on 9/21/23.  Calendar reviewed with patient. Patient verbalizes understanding.

## 2023-09-07 NOTE — PROGRESS NOTES
Chart reviewed, agree with LPN plan..  Outpatient bridging with enoxaparin is difficult and with increased bleeding risk since he is a dialysis patient - enoxaparin clearance is insignificant with HD.  His best and safest option would be hospital admission with Heparin bridge for AVR protection and reduced bleeding risk.

## 2023-09-11 ENCOUNTER — PATIENT MESSAGE (OUTPATIENT)
Dept: ADMINISTRATIVE | Facility: CLINIC | Age: 53
End: 2023-09-11
Payer: MEDICARE

## 2023-09-13 ENCOUNTER — PATIENT OUTREACH (OUTPATIENT)
Dept: ADMINISTRATIVE | Facility: HOSPITAL | Age: 53
End: 2023-09-13
Payer: MEDICARE

## 2023-09-13 NOTE — PROGRESS NOTES
Payor Panel Clean Up: Spoke with Pt to verify PCP, Pt says he wants to keep current PCP explaining he goes to the doctor so much & too much to keep up with. Pt agrees to come for PCP visit on a day he is already scheduled to come in. Accepts appt for 10/10/2023.

## 2023-09-21 ENCOUNTER — ANTI-COAG VISIT (OUTPATIENT)
Dept: CARDIOLOGY | Facility: CLINIC | Age: 53
End: 2023-09-21
Payer: MEDICARE

## 2023-09-21 DIAGNOSIS — Z79.01 LONG TERM (CURRENT) USE OF ANTICOAGULANTS: Primary | ICD-10-CM

## 2023-09-21 DIAGNOSIS — Z79.01 ANTICOAGULATED: ICD-10-CM

## 2023-09-21 DIAGNOSIS — Z95.2 HISTORY OF MECHANICAL AORTIC VALVE REPLACEMENT: ICD-10-CM

## 2023-09-21 LAB — INR PPP: 2.9 (ref 2–3)

## 2023-09-21 PROCEDURE — 93793 ANTICOAG MGMT PT WARFARIN: CPT | Mod: ,,,

## 2023-09-21 PROCEDURE — 93793 PR ANTICOAGULANT MGMT FOR PT TAKING WARFARIN: ICD-10-PCS | Mod: ,,,

## 2023-09-21 PROCEDURE — 99999PBSHW POCT INR: ICD-10-PCS | Mod: PBBFAC,,,

## 2023-09-21 PROCEDURE — 85610 PROTHROMBIN TIME: CPT | Mod: PBBFAC

## 2023-09-21 PROCEDURE — 99999PBSHW POCT INR: Mod: PBBFAC,,,

## 2023-09-21 NOTE — PROGRESS NOTES
INR at goal. Medications and chart reviewed. No changes noted to necessitate adjustment of warfarin or follow-up plan. See calendar.  Patient and MD should be aware of the bleeding risk with enoxaparin bridging as HD does not remove enoxaparin.  Unsure of bleeding risk involved with this particular surgery.  First recommendation would be hospital admission with Heparin drip- pt with mechanical AVR + a fib and HD.  We will await Dr Gudino's advice/clearance regarding this procedure-pt will see MD on 9/26.  For now, continue warfarin dose.  We will see him on 10/10/23 for INR and procedure dosing.

## 2023-09-21 NOTE — PROGRESS NOTES
Patient's INR is therapeutic at 2.9.  Patient is scheduled to have surgery on 10/17/23 with Dr. Shirley, Urologist.  Patient states he was advised by  he will need to be off of warfarin and bridged.  Patient's appointment has been changed to 9/26/23 with Dr. Gudino, Cardiologist for pre-op clearance .  Sent to PharmD for dosing/instructions.

## 2023-09-26 ENCOUNTER — OFFICE VISIT (OUTPATIENT)
Dept: CARDIOLOGY | Facility: CLINIC | Age: 53
End: 2023-09-26
Payer: MEDICARE

## 2023-09-26 VITALS
WEIGHT: 220.69 LBS | HEIGHT: 74 IN | DIASTOLIC BLOOD PRESSURE: 82 MMHG | OXYGEN SATURATION: 99 % | HEART RATE: 74 BPM | SYSTOLIC BLOOD PRESSURE: 122 MMHG | BODY MASS INDEX: 28.32 KG/M2

## 2023-09-26 DIAGNOSIS — I27.20 PULMONARY HYPERTENSION: ICD-10-CM

## 2023-09-26 DIAGNOSIS — I50.32 CHRONIC DIASTOLIC HEART FAILURE: ICD-10-CM

## 2023-09-26 DIAGNOSIS — I47.19 ATRIAL TACHYCARDIA: ICD-10-CM

## 2023-09-26 DIAGNOSIS — N18.9 ANEMIA ASSOCIATED WITH CHRONIC RENAL FAILURE: ICD-10-CM

## 2023-09-26 DIAGNOSIS — Z79.01 ANTICOAGULATED: ICD-10-CM

## 2023-09-26 DIAGNOSIS — N18.6 END STAGE RENAL DISEASE: ICD-10-CM

## 2023-09-26 DIAGNOSIS — Z95.2 H/O MECHANICAL AORTIC VALVE REPLACEMENT: ICD-10-CM

## 2023-09-26 DIAGNOSIS — Q24.9 ADULT CONGENITAL HEART DISEASE: ICD-10-CM

## 2023-09-26 DIAGNOSIS — Z99.2 ESRD (END STAGE RENAL DISEASE) ON DIALYSIS: ICD-10-CM

## 2023-09-26 DIAGNOSIS — I27.20 PULMONARY HTN: ICD-10-CM

## 2023-09-26 DIAGNOSIS — I70.0 AORTIC ATHEROSCLEROSIS: ICD-10-CM

## 2023-09-26 DIAGNOSIS — Z01.810 PREOP CARDIOVASCULAR EXAM: Primary | ICD-10-CM

## 2023-09-26 DIAGNOSIS — B18.1 CHRONIC HEPATITIS B: ICD-10-CM

## 2023-09-26 DIAGNOSIS — D63.1 ANEMIA ASSOCIATED WITH CHRONIC RENAL FAILURE: ICD-10-CM

## 2023-09-26 DIAGNOSIS — Z98.890 HISTORY OF RADIOFREQUENCY ABLATION FOR COMPLEX RIGHT ATRIAL ARRHYTHMIA: ICD-10-CM

## 2023-09-26 DIAGNOSIS — I27.9 CHRONIC PULMONARY HEART DISEASE: ICD-10-CM

## 2023-09-26 DIAGNOSIS — N18.6 END-STAGE RENAL DISEASE: ICD-10-CM

## 2023-09-26 DIAGNOSIS — Z79.01 LONG TERM (CURRENT) USE OF ANTICOAGULANTS: ICD-10-CM

## 2023-09-26 DIAGNOSIS — I49.3 PVC'S (PREMATURE VENTRICULAR CONTRACTIONS): ICD-10-CM

## 2023-09-26 DIAGNOSIS — I10 ESSENTIAL HYPERTENSION: ICD-10-CM

## 2023-09-26 DIAGNOSIS — G47.33 OSA (OBSTRUCTIVE SLEEP APNEA): ICD-10-CM

## 2023-09-26 DIAGNOSIS — B18.1 CHRONIC VIRAL HEPATITIS B WITHOUT DELTA AGENT AND WITHOUT COMA: ICD-10-CM

## 2023-09-26 DIAGNOSIS — I48.0 PAF (PAROXYSMAL ATRIAL FIBRILLATION): ICD-10-CM

## 2023-09-26 DIAGNOSIS — Z95.2 S/P AVR (AORTIC VALVE REPLACEMENT): ICD-10-CM

## 2023-09-26 DIAGNOSIS — I47.29 NSVT (NONSUSTAINED VENTRICULAR TACHYCARDIA): ICD-10-CM

## 2023-09-26 DIAGNOSIS — Z95.2 HISTORY OF MECHANICAL AORTIC VALVE REPLACEMENT: ICD-10-CM

## 2023-09-26 DIAGNOSIS — I11.0 HYPERTENSIVE CARDIOMEGALY WITH HEART FAILURE: ICD-10-CM

## 2023-09-26 DIAGNOSIS — Z95.2 H/O AORTIC VALVE REPLACEMENT: ICD-10-CM

## 2023-09-26 DIAGNOSIS — N18.6 ESRD (END STAGE RENAL DISEASE) ON DIALYSIS: ICD-10-CM

## 2023-09-26 PROCEDURE — 99999 PR PBB SHADOW E&M-EST. PATIENT-LVL V: ICD-10-PCS | Mod: PBBFAC,,, | Performed by: INTERNAL MEDICINE

## 2023-09-26 PROCEDURE — 99215 PR OFFICE/OUTPT VISIT, EST, LEVL V, 40-54 MIN: ICD-10-PCS | Mod: S$PBB,,, | Performed by: INTERNAL MEDICINE

## 2023-09-26 PROCEDURE — 99215 OFFICE O/P EST HI 40 MIN: CPT | Mod: PBBFAC | Performed by: INTERNAL MEDICINE

## 2023-09-26 PROCEDURE — 99215 OFFICE O/P EST HI 40 MIN: CPT | Mod: S$PBB,,, | Performed by: INTERNAL MEDICINE

## 2023-09-26 PROCEDURE — 99999 PR PBB SHADOW E&M-EST. PATIENT-LVL V: CPT | Mod: PBBFAC,,, | Performed by: INTERNAL MEDICINE

## 2023-09-26 PROCEDURE — 93010 ELECTROCARDIOGRAM REPORT: CPT | Mod: ,,, | Performed by: INTERNAL MEDICINE

## 2023-09-26 PROCEDURE — 93005 ELECTROCARDIOGRAM TRACING: CPT

## 2023-09-26 PROCEDURE — 93010 EKG 12-LEAD: ICD-10-PCS | Mod: ,,, | Performed by: INTERNAL MEDICINE

## 2023-09-26 NOTE — PROGRESS NOTES
Subjective:   Patient ID:  Isidro White Jr. is a 53 y.o. male who presents for cardiac consult of No chief complaint on file.          The patient came in today for cardiac consult of No chief complaint on file.      Isidro White Jr. is a 53 y.o. male pt with PAF, ASD closure at age 8 ([facility]), HFpEF, s/p AVR with a 22 mm mechanical valve and TV annuloplasty with a 28 mm ring 3/17, HTN, PHTN, Mitral stenosis, SVT, EP performed RFA (3/13/17) and has been on HD - MWF since Feb 8,2016 here for CV follow up.     12/13/18  Holter from 9/4/18 revealed freq PVCs, dilt increased to 240 mg. No palpitations. Has been doing well lately, BP is well controlled. Labwork from HD has been normal. Still makes some urine, says he has some hematuria. Will need annual stress and 2D echo. He is also undergoing workup for HepB will see Dr. Youngblood.     9/27/22  Pt saw Dr. Sun in July for CP. ECHO in July 2022 with normal bi V function, stable valves AV and MV, mild to mod MS, PASP 54 mmHg. BP and Hr well controlled today. BMI 27 - 212 lbs. He has more issues with ED lately, [facility].     4/4/23  BP and Hr well controlled. BMI 27 - 212 lbs. He has been taken off transplant list due to high risk nature with pulm HTN.     9/26/23  ECHO 4/2023 with normal bi V function, grade 1 DD, mech AV with mean 19mmHg, mild to mod MS - 11 mmHg. Mild to mod TR, PASP 47 mmHg.     He has upcoming penile prosthesis implant with Dr. Shirley.  He is here for preop eval.   BP and HR well controlled. Pt will need to be bridged inpatient with heparin drip while INR drifts down for acceptable level for surgery and remain on heparin while coumadin restarted.   Cardiology should be consulted preop while inpt.     ECG - Afib, PVCs, LAD, nonsc IVCD, LVH    Patient feels  no PND, no palpitation, no dizziness, no syncope, no CNS symptoms.    Patient has dec exercise tolerance.     Patient is compliant with  medications.    Results for orders placed during the hospital encounter of 04/27/23    Echo    Interpretation Summary  · The left ventricle is normal in size with concentric remodeling and low normal systolic function.  · Mild left atrial enlargement.  · Grade I left ventricular diastolic dysfunction.  · The estimated PA systolic pressure is 47 mmHg.  · Normal right ventricular size with normal right ventricular systolic function.  · There is pulmonary hypertension.  · Normal central venous pressure (3 mmHg).  · There is a bileaflet tilting disc mechanical aortic valve present. Prosthetic aortic valve is normal.  · The aortic valve mean gradient is 19 mmHg with a dimensionless index of 0.47.  · The estimated ejection fraction is 50%.  · The mean diastolic gradient across the mitral valve is 11 mmHg at a heart rate of 76 bpm.  · There is mild to moderate mitral stenosis.  · Mild to moderate tricuspid regurgitation.      Results for orders placed during the hospital encounter of 07/07/22    Echo    Interpretation Summary  · The left ventricle is normal in size with moderate concentric hypertrophy and normal systolic function.  · Mild left atrial enlargement.  · The estimated ejection fraction is 60%.  · Grade II left ventricular diastolic dysfunction.  · There is abnormal septal wall motion consistent with post-operative status.  · Normal right ventricular size with normal right ventricular systolic function.  · Mild right atrial enlargement.  · There is a bileaflet tilting disc mechanical aortic valve present.  · The aortic valve mean gradient is 15 mmHg with a dimensionless index of 0.52.  · The mean diastolic gradient across the mitral valve is 11 mmHg at a heart rate of bpm.  · There is mild to moderate mitral stenosis.  · There is moderate pulmonary hypertension.  · Mild tricuspid regurgitation.  · Intermediate central venous pressure (8 mmHg).  · The estimated PA systolic pressure is 54 mmHg.  · The aortic root  is mildly dilated.      RHC  Summary       The estimated blood loss was <50 mL.  Believe PA pressures are elevated in setting of increased filling pressures. Mean PA pressure is right around 34.  Recommend stricter volume management, review of echoes for the last 3 year demonstrates similar findings.  If this is a barrier to renal transplant, can consider adding PDE5, however do not think he will be able to tolerate given increased filling pressure and patient stating he drops pressures with HD.  Will likely need to discuss with abdominal transplant team.     The procedure log was documented by Documenter: Isis Sevilla and verified by Mariah Pierce MD.      Results for orders placed during the hospital encounter of 05/25/21    Nuclear Stress - Cardiology Interpreted    Interpretation Summary    Normal myocardial perfusion scan. There is no evidence of myocardial ischemia or infarction.    The gated perfusion images showed an ejection fraction of 65% at rest. The gated perfusion images showed an ejection fraction of 47% post stress.    The EKG portion of this study is negative for ischemia.    · Intermediate central venous pressure (8 mmHg).       Cath 2/17  1. Coronary angiography.      a.     Left main widely patent.      b.     LAD widely patent.      c.     Ramus widely patent and massive in size.      d.     Circumflex widely patent with a small OM 1 and OM 2 branch.      e.     Right coronary widely patent.  2. Hemodynamics:  Right heart catheterization.      a.     Pulmonary capillary wedge pressure 33 at end-expiration.      b.     Pulmonary artery pressure 78/42.      c.     Right ventricular pressure 70/21.      d.     Right atrial pressure 22 with a peak V-wave of 24.      e.     Cardiac output by thermodilution is between 6 and 7       L/minute, by Patricia 6.1 L/minute.    CONCLUSION  1. Normal coronary arteries.  2. Pulmonary hypertension.  3. Severe aortic insufficiency.      Past Medical History:    Diagnosis Date    Anemia     Aortic valve stenosis     s/p mechanical AVR    Atrial fibrillation     Atrial flutter     Cardiomyopathy     CHF (congestive heart failure)     Drug-induced erectile dysfunction     ESRD due to hypertension     HD M, W, F    ESRD on dialysis     GERD (gastroesophageal reflux disease)     Hepatitis B     Hyperlipidemia     Hypertension     Nightmares     Obesity     DANIEL (obstructive sleep apnea) 11/12/2019    Secondary hyperparathyroidism of renal origin     Supraventricular tachycardia     atrial tachycardia    Tachycardia     Valvular regurgitation     AI, TR       Past Surgical History:   Procedure Laterality Date    ASD REPAIR      age 8 [facility]    AV Graft Creation Left 03/2017    [facility]      [facility]     CARDIAC VALVE SURGERY  02/08/2017    22 mm Medtronic AV, 28 mm TV Medtronic annuloplaty ring    COLONOSCOPY N/A 8/27/2019    Procedure: COLONOSCOPY;  Surgeon: Addie John MD;  Location: [facility] ENDO;  Service: Endoscopy;  Laterality: N/A;  dialysis pt *needs K*    COLONOSCOPY N/A 9/3/2020    Procedure: COLONOSCOPY-need INR;  Surgeon: Jemma Youngblood MD;  Location: [facility] ENDO;  Service: Endoscopy;  Laterality: N/A;    ESOPHAGOGASTRODUODENOSCOPY N/A 8/27/2019    Procedure: EGD (ESOPHAGOGASTRODUODENOSCOPY);  Surgeon: Addie John MD;  Location: [facility] ENDO;  Service: Endoscopy;  Laterality: N/A;    RADIOFREQUENCY ABLATION  03/13/2017    Atrial tachycardia    REMOVAL OF GRAFT Left 7/2/2020    Procedure: REMOVAL, GRAFT;  Surgeon: Sascha Sidhu MD;  Location: [facility] OR;  Service: Peripheral Vascular;  Laterality: Left;    RIGHT HEART CATHETERIZATION Right 8/12/2021    Procedure: INSERTION, CATHETER, RIGHT HEART;  Surgeon: Mariah Pierce MD;  Location: [facility] CATH LAB;  Service: Cardiology;  Laterality: Right;       Social History     Tobacco Use    Smoking status: Never    Smokeless tobacco: Never   Substance Use Topics     Alcohol use: No     Comment: quit in 2016; does have heavy drinking history; started drinking at age 13; was drinking a 12 pack over the weekend and two 24 ounces of beer a day during the week along with a pint of hard alcohol    Drug use: No       Family History   Problem Relation Age of Onset    Leukemia Mother     Cancer Mother     Heart attack Father         massive MI at age 67    No Known Problems Sister     No Known Problems Brother     No Known Problems Sister     No Known Problems Sister     Heart failure Paternal Grandmother     Diabetes Paternal Aunt     Hypertension Paternal Aunt     Leukemia Paternal Aunt         CLL    Suicide Paternal Uncle     Anesthesia problems Paternal Uncle     Diabetes Paternal Aunt     Stroke Paternal Aunt     Valvular heart disease Maternal Aunt     Kidney disease Neg Hx     Colon cancer Neg Hx            Review of Systems   Constitutional:  Positive for malaise/fatigue.   HENT: Negative.     Eyes: Negative.    Respiratory:  Negative for shortness of breath.    Cardiovascular:  Negative for chest pain and palpitations.   Gastrointestinal: Negative.    Genitourinary:  Negative for hematuria.   Musculoskeletal:  Positive for joint pain.   Skin: Negative.    Neurological: Negative.    Endo/Heme/Allergies: Negative.    Psychiatric/Behavioral: Negative.     All 12 systems otherwise negative.      Wt Readings from Last 3 Encounters:   09/26/23 100.1 kg (220 lb 10.9 oz)   08/24/23 98 kg (216 lb 0.8 oz)   05/11/23 98 kg (216 lb 0.8 oz)     Temp Readings from Last 3 Encounters:   04/28/22 97.5 °F (36.4 °C) (Oral)   02/03/22 98.6 °F (37 °C) (Oral)   01/13/22 98.3 °F (36.8 °C)     BP Readings from Last 3 Encounters:   09/26/23 122/82   08/24/23 106/63   05/11/23 112/73     Pulse Readings from Last 3 Encounters:   09/26/23 74   08/24/23 78   05/11/23 89       /82 (BP Location: Right arm, Patient Position: Sitting, BP Method: Medium (Manual))   Pulse 74   Ht 6'  "2" (1.88 m)   Wt 100.1 kg (220 lb 10.9 oz)   SpO2 99%   BMI 28.33 kg/m²      Objective:   Physical Exam  Vitals and nursing note reviewed.   Constitutional:       General: He is not in acute distress.     Appearance: He is well-developed. He is not diaphoretic.   HENT:      Head: Normocephalic and atraumatic.      Nose: Nose normal.   Eyes:      General: No scleral icterus.     Conjunctiva/sclera: Conjunctivae normal.   Neck:      Thyroid: No thyromegaly.      Vascular: No JVD.   Cardiovascular:      Rate and Rhythm: Normal rate. Rhythm irregular.      Heart sounds: S1 normal and S2 normal. Murmur heard.      Midsystolic murmur is present at the upper right sternal border.      No friction rub. No gallop. No S3 or S4 sounds.      Comments: Crisp S2  Pulmonary:      Effort: Pulmonary effort is normal. No respiratory distress.      Breath sounds: Normal breath sounds. No stridor. No wheezing or rales.   Chest:      Chest wall: No tenderness.   Abdominal:      General: Bowel sounds are normal. There is no distension.      Palpations: Abdomen is soft. There is no mass.      Tenderness: There is no abdominal tenderness. There is no rebound.   Genitourinary:     Comments: Deferred  Musculoskeletal:         General: No tenderness or deformity. Normal range of motion.      Cervical back: Normal range of motion and neck supple.   Lymphadenopathy:      Cervical: No cervical adenopathy.   Skin:     General: Skin is warm and dry.      Coloration: Skin is not pale.      Findings: No erythema or rash.   Neurological:      Mental Status: He is alert and oriented to person, place, and time.      Motor: No abnormal muscle tone.      Coordination: Coordination normal.   Psychiatric:         Behavior: Behavior normal.         Thought Content: Thought content normal.         Judgment: Judgment normal.       Lab Results   Component Value Date     08/22/2023    K 5.6 (H) 08/22/2023    CL 97 08/22/2023    CO2 28 08/22/2023    BUN " 57 (H) 08/22/2023    CREATININE 14.0 (H) 08/22/2023    GLU 81 08/22/2023    HGBA1C 4.8 09/20/2019    MG 2.3 08/12/2021    AST 11 08/22/2023    ALT 14 08/22/2023    ALBUMIN 3.5 08/22/2023    PROT 8.3 08/22/2023    BILITOT 0.5 08/22/2023    WBC 5.11 08/22/2023    HGB 11.3 (L) 08/22/2023    HCT 34.9 (L) 08/22/2023    HCT 25 (L) 02/08/2017     (H) 08/22/2023     (L) 08/22/2023    INR 2.9 09/21/2023    INR 1.5 (H) 08/22/2023    CHOL 248 (H) 11/05/2020    HDL 42 11/05/2020    LDLCALC 164.8 (H) 11/05/2020    TRIG 206 (H) 11/05/2020     (H) 07/01/2021     Assessment:      1. History of mechanical aortic valve replacement    2. Long term (current) use of anticoagulants    3. PVC's (premature ventricular contractions)    4. Anticoagulated    5. End-stage renal disease    6. Adult congenital heart disease    7. Pulmonary hypertension    8. S/P AVR (aortic valve replacement)    9. Hypertensive cardiomegaly with heart failure    10. History of radiofrequency ablation for complex right atrial arrhythmia    11. H/O mechanical aortic valve replacement    12. Chronic viral hepatitis B without delta agent and without coma    13. H/O aortic valve replacement    14. Chronic diastolic heart failure    15. Chronic pulmonary heart disease    16. Pulmonary HTN    17. Essential hypertension    18. End stage renal disease    19. Atrial tachycardia    20. NSVT (nonsustained ventricular tachycardia)    21. Anemia associated with chronic renal failure    22. DANIEL (obstructive sleep apnea)    23. ESRD (end stage renal disease) on dialysis    24. Chronic hepatitis B            Plan:   1. Chronic Diastolic HF  - cont low salt diet  - cont HD for volume removal    2. S/p mech AVR, with mild to moderate MS, mild to mod TR  - cont coumadin  - f/u with coumadin clinic here  - f/u with [facility] - [place]  - ECHO in July 2022 with normal bi V function, stable valves AV and MV, mild to mod MS, PASP 54 mmHg  -ECHO 4/2023 with  normal bi V function, grade 1 DD, mech AV with mean 19mmHg, mild to mod MS - 11 mmHg. Mild to mod TR, PASP 47 mmHg.     3. ESRD - dry weight improved  - cont HD  - anemia with PRBCs and EPO    4. HTN   - cont meds  - adjusted BP meds - labetelol TID    5. PVCs, SVT s/p RFA , Afib  - cont BB and CCB   - ordered 14 day Vital   - refer to EP - discuss ablation for AFib    6. HLD  - cont statin    7. ED  - cont tx PRN  - f/u urology   - was on Cialis, start Viagra f/u with urology   - monitor BP, avoid nitrates     8. Pulm HTN/ DANIEL  - PASP 56 mmHg. - repeat ECHO ordered   -f/u CHF/pulm HTN clinic - may start PDE5  - f/u with Dr. Pierce    9. Chronic hep B   - cont tx per hepatology  - further workup pending for cirrhosis - EGD    10.  Pre-OP CV evaluation prior to renal transplant  - Elevated CV risk for renal transplant but discuss with pulm HTN clinic  - may have further workup at [facility]    11. Preop CV eval -  penile prosthesis implant with Dr. Shirley 10/17/23  - elevated CV risk due to significant valve disease, Afib, PVCs  - cont BB periop    - need to be bridged inpatient with heparin drip while INR drifts down for acceptable level for surgery and remain on heparin while coumadin restarted.   - Cardiology should be consulted preop while inpt, recommend cardiac anesthesiologist     Thank you for allowing me to participate in this patient's care. Please do not hesitate to contact me with any questions or concerns. Consult note has been forwarded to the referral physician.

## 2023-09-26 NOTE — Clinical Note
Preop CV eval -  penile prosthesis implant with Dr. Shirley 10/17/23 - elevated CV risk due to significant valve disease, CHF - cont BB periop   -needs to be bridged inpatient with heparin drip while INR drifts down for acceptable level for surgery and remain on heparin while coumadin restarted.  - Cardiology should be consulted preop while inpt -  recommend cardiac anesthesiologist

## 2023-09-29 RX ORDER — HYDRALAZINE HYDROCHLORIDE 100 MG/1
100 TABLET, FILM COATED ORAL 3 TIMES DAILY
Qty: 90 TABLET | Refills: 6 | Status: CANCELLED | OUTPATIENT
Start: 2023-09-29

## 2023-09-29 RX ORDER — AMLODIPINE BESYLATE 10 MG/1
10 TABLET ORAL DAILY
Qty: 30 TABLET | Refills: 6 | Status: CANCELLED | OUTPATIENT
Start: 2023-09-29

## 2023-10-04 RX ORDER — HYDROXYZINE HYDROCHLORIDE 25 MG/1
TABLET, FILM COATED ORAL
Qty: 30 TABLET | Refills: 6 | Status: SHIPPED | OUTPATIENT
Start: 2023-10-04

## 2023-10-05 ENCOUNTER — OFFICE VISIT (OUTPATIENT)
Dept: UROLOGY | Facility: CLINIC | Age: 53
End: 2023-10-05
Payer: MEDICARE

## 2023-10-05 VITALS
DIASTOLIC BLOOD PRESSURE: 61 MMHG | BODY MASS INDEX: 28.32 KG/M2 | HEART RATE: 96 BPM | SYSTOLIC BLOOD PRESSURE: 95 MMHG | WEIGHT: 220.69 LBS | HEIGHT: 74 IN | RESPIRATION RATE: 16 BRPM

## 2023-10-05 DIAGNOSIS — N52.9 ERECTILE DYSFUNCTION, UNSPECIFIED ERECTILE DYSFUNCTION TYPE: Primary | ICD-10-CM

## 2023-10-05 DIAGNOSIS — Z01.818 PRE-OP EVALUATION: ICD-10-CM

## 2023-10-05 DIAGNOSIS — N28.1 RENAL CYST: ICD-10-CM

## 2023-10-05 DIAGNOSIS — R31.0 GROSS HEMATURIA: ICD-10-CM

## 2023-10-05 PROCEDURE — 99999 PR PBB SHADOW E&M-EST. PATIENT-LVL V: ICD-10-PCS | Mod: PBBFAC,,, | Performed by: UROLOGY

## 2023-10-05 PROCEDURE — 99214 PR OFFICE/OUTPT VISIT, EST, LEVL IV, 30-39 MIN: ICD-10-PCS | Mod: S$PBB,,, | Performed by: UROLOGY

## 2023-10-05 PROCEDURE — 99999 PR PBB SHADOW E&M-EST. PATIENT-LVL V: CPT | Mod: PBBFAC,,, | Performed by: UROLOGY

## 2023-10-05 PROCEDURE — 99214 OFFICE O/P EST MOD 30 MIN: CPT | Mod: S$PBB,,, | Performed by: UROLOGY

## 2023-10-05 PROCEDURE — 99215 OFFICE O/P EST HI 40 MIN: CPT | Mod: PBBFAC | Performed by: UROLOGY

## 2023-10-10 ENCOUNTER — HOSPITAL ENCOUNTER (OUTPATIENT)
Dept: RADIOLOGY | Facility: HOSPITAL | Age: 53
Discharge: HOME | End: 2023-10-10
Attending: UROLOGY
Payer: MEDICARE

## 2023-10-10 ENCOUNTER — HOSPITAL ENCOUNTER (OUTPATIENT)
Dept: CARDIOLOGY | Facility: HOSPITAL | Age: 53
Discharge: HOME | End: 2023-10-10
Attending: INTERNAL MEDICINE
Payer: MEDICARE

## 2023-10-10 ENCOUNTER — HOSPITAL ENCOUNTER (OUTPATIENT)
Dept: CARDIOLOGY | Facility: HOSPITAL | Age: 53
Discharge: HOME | End: 2023-10-10
Attending: UROLOGY
Payer: MEDICARE

## 2023-10-10 ENCOUNTER — OFFICE VISIT (OUTPATIENT)
Dept: INTERNAL MEDICINE | Facility: CLINIC | Age: 53
End: 2023-10-10
Payer: MEDICARE

## 2023-10-10 ENCOUNTER — ANTI-COAG VISIT (OUTPATIENT)
Dept: CARDIOLOGY | Facility: CLINIC | Age: 53
End: 2023-10-10
Payer: MEDICARE

## 2023-10-10 VITALS
DIASTOLIC BLOOD PRESSURE: 70 MMHG | TEMPERATURE: 97 F | WEIGHT: 224.19 LBS | BODY MASS INDEX: 28.77 KG/M2 | RESPIRATION RATE: 17 BRPM | SYSTOLIC BLOOD PRESSURE: 106 MMHG | HEIGHT: 74 IN | OXYGEN SATURATION: 97 % | HEART RATE: 84 BPM

## 2023-10-10 DIAGNOSIS — Z95.2 HISTORY OF MECHANICAL AORTIC VALVE REPLACEMENT: ICD-10-CM

## 2023-10-10 DIAGNOSIS — J43.9 MIXED RESTRICTIVE AND OBSTRUCTIVE LUNG DISEASE: ICD-10-CM

## 2023-10-10 DIAGNOSIS — I50.32 CHRONIC DIASTOLIC HEART FAILURE: ICD-10-CM

## 2023-10-10 DIAGNOSIS — Z79.01 LONG TERM (CURRENT) USE OF ANTICOAGULANTS: Primary | ICD-10-CM

## 2023-10-10 DIAGNOSIS — I48.91 ATRIAL FIBRILLATION, UNSPECIFIED TYPE: Primary | ICD-10-CM

## 2023-10-10 DIAGNOSIS — J98.4 MIXED RESTRICTIVE AND OBSTRUCTIVE LUNG DISEASE: ICD-10-CM

## 2023-10-10 DIAGNOSIS — N18.6 ESRD (END STAGE RENAL DISEASE) ON DIALYSIS: Chronic | ICD-10-CM

## 2023-10-10 DIAGNOSIS — Z99.2 ESRD (END STAGE RENAL DISEASE) ON DIALYSIS: Chronic | ICD-10-CM

## 2023-10-10 DIAGNOSIS — Z01.818 PRE-OP TESTING: ICD-10-CM

## 2023-10-10 DIAGNOSIS — I49.3 PVC'S (PREMATURE VENTRICULAR CONTRACTIONS): ICD-10-CM

## 2023-10-10 DIAGNOSIS — K74.60 HEPATIC CIRRHOSIS, UNSPECIFIED HEPATIC CIRRHOSIS TYPE, UNSPECIFIED WHETHER ASCITES PRESENT: ICD-10-CM

## 2023-10-10 DIAGNOSIS — I10 ESSENTIAL HYPERTENSION: ICD-10-CM

## 2023-10-10 DIAGNOSIS — N25.81 SECONDARY HYPERPARATHYROIDISM OF RENAL ORIGIN: ICD-10-CM

## 2023-10-10 DIAGNOSIS — I48.0 PAF (PAROXYSMAL ATRIAL FIBRILLATION): ICD-10-CM

## 2023-10-10 DIAGNOSIS — I11.0 HYPERTENSIVE CARDIOMEGALY WITH HEART FAILURE: ICD-10-CM

## 2023-10-10 DIAGNOSIS — R73.09 ELEVATED GLUCOSE: ICD-10-CM

## 2023-10-10 DIAGNOSIS — Z79.01 ANTICOAGULATED: ICD-10-CM

## 2023-10-10 LAB — INR PPP: 1.8 (ref 2–3)

## 2023-10-10 PROCEDURE — 93010 EKG 12-LEAD: ICD-10-PCS | Mod: ,,, | Performed by: INTERNAL MEDICINE

## 2023-10-10 PROCEDURE — 71046 X-RAY EXAM CHEST 2 VIEWS: CPT | Mod: TC

## 2023-10-10 PROCEDURE — 85610 PROTHROMBIN TIME: CPT | Mod: PBBFAC

## 2023-10-10 PROCEDURE — 99999 PR PBB SHADOW E&M-EST. PATIENT-LVL V: ICD-10-PCS | Mod: PBBFAC,,, | Performed by: PEDIATRICS

## 2023-10-10 PROCEDURE — 71046 X-RAY EXAM CHEST 2 VIEWS: CPT | Mod: 26,,, | Performed by: RADIOLOGY

## 2023-10-10 PROCEDURE — 93010 ELECTROCARDIOGRAM REPORT: CPT | Mod: ,,, | Performed by: INTERNAL MEDICINE

## 2023-10-10 PROCEDURE — 99214 OFFICE O/P EST MOD 30 MIN: CPT | Mod: S$PBB,,, | Performed by: PEDIATRICS

## 2023-10-10 PROCEDURE — 71046 XR CHEST PA AND LATERAL: ICD-10-PCS | Mod: 26,,, | Performed by: RADIOLOGY

## 2023-10-10 PROCEDURE — 93005 ELECTROCARDIOGRAM TRACING: CPT

## 2023-10-10 PROCEDURE — 93793 ANTICOAG MGMT PT WARFARIN: CPT | Mod: ,,,

## 2023-10-10 PROCEDURE — 99999PBSHW POCT INR: ICD-10-PCS | Mod: PBBFAC,,,

## 2023-10-10 PROCEDURE — 93248 CV CARDIAC MONITOR - 3-15 DAY ADULT (CUPID ONLY): ICD-10-PCS | Mod: ,,, | Performed by: INTERNAL MEDICINE

## 2023-10-10 PROCEDURE — 99215 OFFICE O/P EST HI 40 MIN: CPT | Mod: PBBFAC,25 | Performed by: PEDIATRICS

## 2023-10-10 PROCEDURE — 93793 PR ANTICOAGULANT MGMT FOR PT TAKING WARFARIN: ICD-10-PCS | Mod: ,,,

## 2023-10-10 PROCEDURE — 93248 EXT ECG>7D<15D REV&INTERPJ: CPT | Mod: ,,, | Performed by: INTERNAL MEDICINE

## 2023-10-10 PROCEDURE — 99214 PR OFFICE/OUTPT VISIT, EST, LEVL IV, 30-39 MIN: ICD-10-PCS | Mod: S$PBB,,, | Performed by: PEDIATRICS

## 2023-10-10 PROCEDURE — 99999 PR PBB SHADOW E&M-EST. PATIENT-LVL V: CPT | Mod: PBBFAC,,, | Performed by: PEDIATRICS

## 2023-10-10 PROCEDURE — 99999PBSHW POCT INR: Mod: PBBFAC,,,

## 2023-10-10 NOTE — PROGRESS NOTES
Patient's INR is sub-therapeutic at 1.8.  Patient reports he missed a dose of warfarin.  Advised of increased risk of clotting; signs/symptoms of clotting and need to go to ED if he experiences any.  Patient reports he is not having any signs/symptoms of clotting.  Patient reports his surgery with Urology has been cancelled.  Instructions given: Will give increased dose of warfarin 15 mg today-10/10/23; then resume warfarin 7.5 mg on Fridays and Mondays; and 11.25 mg all other days.   Recheck on 10/24/23.  Calendar reviewed with patient .  Patient verbalizes understanding.

## 2023-10-10 NOTE — PROGRESS NOTES
Subjective     Patient ID: Isidro White Jr. is a 53 y.o. male.    Chief Complaint: Annual Exam    Isidro White Jr. is a 53 y.o. male who presents to the clinic for annual.     PMHx, PSHx, SocHx, and FHx reviewed and discussed with patient.    Patient Active Problem List:     Essential hypertension     Hypertensive cardiomegaly with heart failure     Pulmonary HTN     Adult congenital heart disease     Hyperglycemia     History of mechanical aortic valve replacement     Postprocedural hypotension     ESRD (end stage renal disease) on dialysis     Severe protein-calorie malnutrition     Anemia of chronic disease     Chronic diastolic heart failure     Atrial tachycardia     History of radiofrequency ablation for complex right atrial arrhythmia     Erectile dysfunction     Chronic pulmonary heart disease     Secondary hyperparathyroidism of renal origin     Atrial fibrillation     Anticoagulated     Encounter for colorectal cancer screening     Colon cancer screening     Special screening for malignant neoplasms, colon     Abnormal diffusion capacity determined by pulmonary function test     Mixed restrictive and obstructive lung disease     DANIEL (obstructive sleep apnea)     Nocturnal hypoxemia due to obstructive chronic bronchitis     Pulmonary nodule, left     Psychophysiological insomnia     Primary snoring     Periodic limb movement disorder (PLMD)     Inadequate sleep hygiene     Arteriovenous graft infection     NSVT (nonsustained ventricular tachycardia)     Bleeding from dialysis shunt     Disorder of electrolytes     Anemia associated with chronic renal failure     Hepatic cirrhosis     Gross hematuria     Renal cyst     Aortic atherosclerosis    Past Surgical History:  No date: ASD REPAIR      Comment:  age 8 [place]  03/2017: AV Graft Creation; Left  No date: CARDIAC CATHETERIZATION      Comment:  [facility]   02/08/2017: CARDIAC VALVE SURGERY      Comment:  22 mm Medtronic AV, 28 mm TV  Medtronic annuloplaty ring  8/27/2019: COLONOSCOPY; N/A      Comment:  Procedure: COLONOSCOPY;  Surgeon: Addie John MD;                 Location: [facility] ENDO;  Service: Endoscopy;  Laterality:                N/A;  dialysis pt *needs K*  9/3/2020: COLONOSCOPY; N/A      Comment:  Procedure: COLONOSCOPY-need INR;  Surgeon: Jemma Youngblood MD;  Location: [facility] ENDO;  Service: Endoscopy;                 Laterality: N/A;  8/27/2019: ESOPHAGOGASTRODUODENOSCOPY; N/A      Comment:  Procedure: EGD (ESOPHAGOGASTRODUODENOSCOPY);  Surgeon:                Addie John MD;  Location: [facility] ENDO;  Service:                Endoscopy;  Laterality: N/A;  03/13/2017: RADIOFREQUENCY ABLATION      Comment:  Atrial tachycardia  7/2/2020: REMOVAL OF GRAFT; Left      Comment:  Procedure: REMOVAL, GRAFT;  Surgeon: Sascha Sidhu MD;               Location: [facility] OR;  Service: Peripheral Vascular;                 Laterality: Left;  8/12/2021: RIGHT HEART CATHETERIZATION; Right      Comment:  Procedure: INSERTION, CATHETER, RIGHT HEART;  Surgeon:                Mariah Pierce MD;  Location: [facility] CATH LAB;  Service:                Cardiology;  Laterality: Right;    Review of patient's family history indicates:  Problem: Leukemia      Relation: Mother          Age of Onset: (Not Specified)  Problem: Cancer      Relation: Mother          Age of Onset: (Not Specified)  Problem: Heart attack      Relation: Father          Age of Onset: (Not Specified)          Comment: massive MI at age 67  Problem: No Known Problems      Relation: Sister          Age of Onset: (Not Specified)  Problem: No Known Problems      Relation: Brother          Age of Onset: (Not Specified)  Problem: No Known Problems      Relation: Sister          Age of Onset: (Not Specified)  Problem: No Known Problems      Relation: Sister          Age of Onset: (Not Specified)  Problem: Heart failure      Relation: Paternal Grandmother          Age of Onset: (Not  Specified)  Problem: Diabetes      Relation: Paternal Aunt          Age of Onset: (Not Specified)  Problem: Hypertension      Relation: Paternal Aunt          Age of Onset: (Not Specified)  Problem: Leukemia      Relation: Paternal Aunt          Age of Onset: (Not Specified)          Comment: CLL  Problem: Suicide      Relation: Paternal Uncle          Age of Onset: (Not Specified)  Problem: Anesthesia problems      Relation: Paternal Uncle          Age of Onset: (Not Specified)  Problem: Diabetes      Relation: Paternal Aunt          Age of Onset: (Not Specified)  Problem: Stroke      Relation: Paternal Aunt          Age of Onset: (Not Specified)  Problem: Valvular heart disease      Relation: Maternal Aunt          Age of Onset: (Not Specified)  Problem: Kidney disease      Relation: Neg Hx          Age of Onset: (Not Specified)  Problem: Colon cancer      Relation: Neg Hx          Age of Onset: (Not Specified)    Social History    Socioeconomic History      Marital status: Single    Tobacco Use      Smoking status: Never      Smokeless tobacco: Never    Substance and Sexual Activity      Alcohol use: No        Comment: quit in 2016; does have heavy drinking history; started drinking at age 13; was drinking a 12 pack over the weekend and two 24 ounces of beer a day during the week along with a pint of hard alcohol      Drug use: No      Sexual activity: Not Currently    Social History Narrative      Caregiver Nurse Marjan Swain; 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          LABS IN PROCESS        Review of Systems   Constitutional:  Negative for chills, diaphoresis, fatigue and fever.   HENT:  Negative for sore throat and trouble swallowing.    Respiratory:  Negative for cough and shortness of breath.    Cardiovascular:  Negative for chest pain.   Gastrointestinal:  Negative for abdominal pain, anal bleeding, constipation, diarrhea, nausea and vomiting.   Endocrine: Negative for cold intolerance, heat intolerance,  polydipsia, polyphagia and polyuria.   All other systems reviewed and are negative.         Objective     Physical Exam  Constitutional:       General: He is not in acute distress.     Appearance: Normal appearance. He is not ill-appearing or toxic-appearing.   Cardiovascular:      Rate and Rhythm: Normal rate.      Pulses: Normal pulses.      Heart sounds: Murmur (grade 1 at upper left sternal border) heard.      No friction rub. No gallop.      Comments: Irregular irregular   Pulmonary:      Effort: Pulmonary effort is normal.      Breath sounds: Normal breath sounds.   Abdominal:      General: There is no distension.      Palpations: There is no mass.      Tenderness: There is no abdominal tenderness. There is no guarding or rebound.      Hernia: No hernia is present.   Musculoskeletal:      Comments: Shunt on L arm    Neurological:      Mental Status: He is alert and oriented to person, place, and time.   Psychiatric:         Mood and Affect: Mood normal.         Behavior: Behavior normal.         Thought Content: Thought content normal.         Judgment: Judgment normal.            Assessment and Plan     1. Atrial fibrillation, unspecified type    2. Chronic diastolic heart failure  Overview:  Non ischemic DCM      3. Anticoagulated    4. ESRD (end stage renal disease) on dialysis    5. Essential hypertension  -     Lipid Panel; Future; Expected date: 10/10/2023    6. Hepatic cirrhosis, unspecified hepatic cirrhosis type, unspecified whether ascites present    7. History of mechanical aortic valve replacement    8. Hypertensive cardiomegaly with heart failure    9. Mixed restrictive and obstructive lung disease    10. Secondary hyperparathyroidism of renal origin    11. Elevated glucose  -     Hemoglobin A1C; Future; Expected date: 10/10/2023        HMI discussed with pt. Shingles vaccine in pharmacy. Covid and RSV vaccines this month. Flu vaccination will be given in dialysis next month as reported by pt.  Overall he is stbale. Keep subspecialty care. Follow up in 6 months with labs (A1C and lipids).         Follow up in about 6 months (around 4/10/2024).

## 2023-10-12 ENCOUNTER — TELEPHONE (OUTPATIENT)
Dept: CARDIOLOGY | Facility: CLINIC | Age: 53
End: 2023-10-12
Payer: MEDICARE

## 2023-10-12 ENCOUNTER — PATIENT MESSAGE (OUTPATIENT)
Dept: CARDIOLOGY | Facility: CLINIC | Age: 53
End: 2023-10-12
Payer: MEDICARE

## 2023-10-12 DIAGNOSIS — I47.10 SVT (SUPRAVENTRICULAR TACHYCARDIA): Primary | ICD-10-CM

## 2023-10-12 RX ORDER — DILTIAZEM HYDROCHLORIDE 420 MG/1
420 CAPSULE, EXTENDED RELEASE ORAL DAILY
Qty: 90 CAPSULE | Refills: 1 | Status: SHIPPED | OUTPATIENT
Start: 2023-10-12 | End: 2024-01-25 | Stop reason: SDUPTHER

## 2023-10-12 NOTE — TELEPHONE ENCOUNTER
Returned call to [facility] regarding pt Supraventricular. Spoke with . She states SVT lasted 10 minutes and 12 seconds. HR at 170 per min. She spoke with pt and he stated he is A-symptomatic, feeling fine.     ----- Message from Carlos Limon sent at 10/12/2023 11:29 AM CDT -----  Contact: [phone]  Calling regarding pt supraventricular . Please call back at [phone]. Benj

## 2023-10-12 NOTE — TELEPHONE ENCOUNTER
Called and spoke to pt to inform him of his prescription update and let him know to be on the look out for a call from MsKatya Lyric to get him scheduled with EP.    ----- Message from Fuad Gudino MD sent at 10/12/2023  2:08 PM CDT -----  Regarding: RE: SVT- [facility]  Contact: [phone]    Hi    OK I am increasing his Diltiazem to 420mg but also want him to see EP asap, Lyric can you schedule him in [place] or [place], he is ok travelling if needed. Thanks    - PM      ----- Message -----  From: Dav Gardner MA  Sent: 10/12/2023   1:51 PM CDT  To: Fuad Gudino MD  Subject: SVT- [facility]                               Hi just wanted to make you aware. I returned call to [facility] regarding pt Supraventricular. Spoke with . She states SVT lasted 10 minutes and 12 seconds. HR at 170 per min. She spoke with pt and he stated he is A-symptomatic, feeling fine.   ----- Message -----  From: Carlos Limon  Sent: 10/12/2023  11:31 AM CDT  To: Shahab Merida Staff    Calling regarding pt supraventricular . Please call back at [phone]. Thanksdj

## 2023-10-24 ENCOUNTER — ANTI-COAG VISIT (OUTPATIENT)
Dept: CARDIOLOGY | Facility: CLINIC | Age: 53
End: 2023-10-24
Payer: MEDICARE

## 2023-10-24 DIAGNOSIS — Z79.01 ANTICOAGULATED: ICD-10-CM

## 2023-10-24 DIAGNOSIS — Z79.01 LONG TERM (CURRENT) USE OF ANTICOAGULANTS: Primary | ICD-10-CM

## 2023-10-24 DIAGNOSIS — Z95.2 HISTORY OF MECHANICAL AORTIC VALVE REPLACEMENT: ICD-10-CM

## 2023-10-24 LAB — INR PPP: 3.6 (ref 2–3)

## 2023-10-24 PROCEDURE — 99999PBSHW POCT INR: Mod: PBBFAC,,,

## 2023-10-24 PROCEDURE — 93793 PR ANTICOAGULANT MGMT FOR PT TAKING WARFARIN: ICD-10-PCS | Mod: ,,,

## 2023-10-24 PROCEDURE — 85610 PROTHROMBIN TIME: CPT | Mod: PBBFAC

## 2023-10-24 PROCEDURE — 99999PBSHW POCT INR: ICD-10-PCS | Mod: PBBFAC,,,

## 2023-10-24 PROCEDURE — 93793 ANTICOAG MGMT PT WARFARIN: CPT | Mod: ,,,

## 2023-10-24 NOTE — PROGRESS NOTES
Patient's INR is supra-therapeutic at 3.6.  Patient reports he followed previous instructions.  Patient reports he completed methylprednisolone on 10/29/23.  Patient reports no other changes.  Advised of increased risk of bleeding; signs/symptoms of bleeding and need to go to ED if he experiences any.  Patient reports he is not having any signs/symptoms of bleeding.  Instructions given: Hold warfarin dose today 10/24/23; then re-challenge warfarin 7.5 mg on  Fridays and Mondays; and 11.25 mg all other days.  Recheck on 10/31/23.  Patient verbalizes understanding.

## 2023-10-27 LAB
OHS CV HOLTER SINUS AVERAGE HR: 86
OHS CV HOLTER SINUS MAX HR: 177
OHS CV HOLTER SINUS MIN HR: 66

## 2023-10-31 ENCOUNTER — TELEPHONE (OUTPATIENT)
Dept: CARDIOLOGY | Facility: CLINIC | Age: 53
End: 2023-10-31
Payer: MEDICARE

## 2023-10-31 NOTE — TELEPHONE ENCOUNTER
Called and spoke to pt regarding his monitor results. Results were given and understood by pt. Appointment scheduled with Dr. De León on 11/08/23 confirmed.    ----- Message from Fuad Gudino MD sent at 10/30/2023  4:07 PM CDT -----  Please contact the patient and let them know that their results reveal frequent episodes of Vtach along with SVT -abnormal rhythms, if feeling any palpitations/chest pain recommend ER eval and admission and will need EP eval while there, otherwise continue current medications and follow up with Dr. De León to discuss treatment options.

## 2023-11-01 DIAGNOSIS — B18.1 CHRONIC VIRAL HEPATITIS B WITHOUT DELTA AGENT AND WITHOUT COMA: ICD-10-CM

## 2023-11-01 RX ORDER — TENOFOVIR DISOPROXIL FUMARATE 300 MG/1
300 TABLET, FILM COATED ORAL WEEKLY
Qty: 4 TABLET | Refills: 5 | Status: CANCELLED | OUTPATIENT
Start: 2023-11-01

## 2023-11-01 RX ORDER — TENOFOVIR DISOPROXIL FUMARATE 300 MG/1
300 TABLET, FILM COATED ORAL WEEKLY
Qty: 4 TABLET | Refills: 1 | Status: ACTIVE | OUTPATIENT
Start: 2023-11-01 | End: 2023-11-02 | Stop reason: SDUPTHER

## 2023-11-02 ENCOUNTER — ANTI-COAG VISIT (OUTPATIENT)
Dept: CARDIOLOGY | Facility: CLINIC | Age: 53
End: 2023-11-02
Payer: MEDICARE

## 2023-11-02 ENCOUNTER — OFFICE VISIT (OUTPATIENT)
Dept: INTERNAL MEDICINE | Facility: CLINIC | Age: 53
End: 2023-11-02
Payer: MEDICARE

## 2023-11-02 ENCOUNTER — LAB VISIT (OUTPATIENT)
Dept: LAB | Facility: HOSPITAL | Age: 53
End: 2023-11-02
Attending: NURSE PRACTITIONER
Payer: MEDICARE

## 2023-11-02 VITALS
DIASTOLIC BLOOD PRESSURE: 68 MMHG | HEART RATE: 94 BPM | TEMPERATURE: 97 F | WEIGHT: 223.13 LBS | HEIGHT: 74 IN | BODY MASS INDEX: 28.64 KG/M2 | SYSTOLIC BLOOD PRESSURE: 104 MMHG | OXYGEN SATURATION: 96 %

## 2023-11-02 DIAGNOSIS — M79.671 RIGHT FOOT PAIN: Primary | ICD-10-CM

## 2023-11-02 DIAGNOSIS — M79.671 RIGHT FOOT PAIN: ICD-10-CM

## 2023-11-02 DIAGNOSIS — Z95.2 HISTORY OF MECHANICAL AORTIC VALVE REPLACEMENT: ICD-10-CM

## 2023-11-02 DIAGNOSIS — B18.1 CHRONIC VIRAL HEPATITIS B WITHOUT DELTA AGENT AND WITHOUT COMA: ICD-10-CM

## 2023-11-02 DIAGNOSIS — Z79.01 LONG TERM (CURRENT) USE OF ANTICOAGULANTS: Primary | ICD-10-CM

## 2023-11-02 DIAGNOSIS — Z79.01 ANTICOAGULATED: ICD-10-CM

## 2023-11-02 LAB
INR PPP: 6 (ref 2–3)
URATE SERPL-MCNC: 6.5 MG/DL (ref 3.4–7)

## 2023-11-02 PROCEDURE — 99999PBSHW POCT INR: ICD-10-PCS | Mod: PBBFAC,,,

## 2023-11-02 PROCEDURE — 85610 PROTHROMBIN TIME: CPT | Mod: PBBFAC

## 2023-11-02 PROCEDURE — 99999 PR PBB SHADOW E&M-EST. PATIENT-LVL V: CPT | Mod: PBBFAC,,, | Performed by: NURSE PRACTITIONER

## 2023-11-02 PROCEDURE — 99213 OFFICE O/P EST LOW 20 MIN: CPT | Mod: S$PBB,,, | Performed by: NURSE PRACTITIONER

## 2023-11-02 PROCEDURE — 99213 PR OFFICE/OUTPT VISIT, EST, LEVL III, 20-29 MIN: ICD-10-PCS | Mod: S$PBB,,, | Performed by: NURSE PRACTITIONER

## 2023-11-02 PROCEDURE — 99215 OFFICE O/P EST HI 40 MIN: CPT | Mod: PBBFAC | Performed by: NURSE PRACTITIONER

## 2023-11-02 PROCEDURE — 93793 PR ANTICOAGULANT MGMT FOR PT TAKING WARFARIN: ICD-10-PCS | Mod: ,,,

## 2023-11-02 PROCEDURE — 84550 ASSAY OF BLOOD/URIC ACID: CPT | Performed by: NURSE PRACTITIONER

## 2023-11-02 PROCEDURE — 99999 PR PBB SHADOW E&M-EST. PATIENT-LVL V: ICD-10-PCS | Mod: PBBFAC,,, | Performed by: NURSE PRACTITIONER

## 2023-11-02 PROCEDURE — 99999PBSHW POCT INR: Mod: PBBFAC,,,

## 2023-11-02 PROCEDURE — 36415 COLL VENOUS BLD VENIPUNCTURE: CPT | Performed by: NURSE PRACTITIONER

## 2023-11-02 PROCEDURE — 93793 ANTICOAG MGMT PT WARFARIN: CPT | Mod: ,,,

## 2023-11-02 RX ORDER — TENOFOVIR DISOPROXIL FUMARATE 300 MG/1
300 TABLET, FILM COATED ORAL WEEKLY
Qty: 4 TABLET | Refills: 1 | Status: SHIPPED | OUTPATIENT
Start: 2023-11-02 | End: 2023-11-02 | Stop reason: SDUPTHER

## 2023-11-02 RX ORDER — TENOFOVIR DISOPROXIL FUMARATE 300 MG/1
300 TABLET, FILM COATED ORAL WEEKLY
Qty: 4 TABLET | Refills: 1 | Status: CANCELLED | OUTPATIENT
Start: 2023-11-02

## 2023-11-02 RX ORDER — METHYLPREDNISOLONE 4 MG/1
TABLET ORAL
Qty: 21 TABLET | Refills: 0 | Status: SHIPPED | OUTPATIENT
Start: 2023-11-02 | End: 2023-12-12

## 2023-11-02 RX ORDER — TENOFOVIR DISOPROXIL FUMARATE 300 MG/1
300 TABLET, FILM COATED ORAL WEEKLY
Qty: 4 TABLET | Refills: 1 | Status: ACTIVE | OUTPATIENT
Start: 2023-11-02 | End: 2024-01-16 | Stop reason: SDUPTHER

## 2023-11-02 NOTE — PROGRESS NOTES
Patient's INR is supra-therapeutic at 6.0.   Patient reports he followed previous instructions.  Patient reports he has not been eating regularly due to a loss due to being busy.  Advised patient of increased risk of bleeding; signs/symptoms of bleeding and need to go to ED if he experiences any.   Patient reports he has been experiencing bloody leakage from his dialysis port.  Sent to PharmD for dosing/instructions.

## 2023-11-02 NOTE — PROGRESS NOTES
INR not at goal. Medications, chart, and patient findings reviewed. See calendar for adjustments to dose and follow up plan.  INR is climbing despite held dose.  We will hold x 2 doses and lower his weekly dose.  Repeat INR in less than 1 week.  ER if bleeding increases.  Pt reports a decrease in nutritional intake.  Needs to increase food intake to avoid dehydration and bleeding.  If access is constantly bleeding without stopping for > 10 mins, needs to go to ER.

## 2023-11-02 NOTE — PROGRESS NOTES
Subjective:       Patient ID: Isidro White Jr. is a 53 y.o. male.    Chief Complaint: gout flare up (Dx 2weeks ago at urgent care/)    HPI    Here for continued R foot pain. .improved a bit with steroid dose pack but not  completely  Had xray at urgent care- negative  Foot pain 5/10    Past Medical History:   Diagnosis Date    Anemia     Aortic valve stenosis     s/p mechanical AVR    Atrial fibrillation     Atrial flutter     Cardiomyopathy     CHF (congestive heart failure)     Drug-induced erectile dysfunction     ESRD due to hypertension     HD M, W, F    ESRD on dialysis     GERD (gastroesophageal reflux disease)     Hepatitis B     Hyperlipidemia     Hypertension     Nightmares     Obesity     DANIEL (obstructive sleep apnea) 11/12/2019    Secondary hyperparathyroidism of renal origin     Supraventricular tachycardia     atrial tachycardia    Tachycardia     Valvular regurgitation     AI, TR     Past Surgical History:   Procedure Laterality Date    ASD REPAIR      age 8 [facility]    AV Graft Creation Left 03/2017    [facility]      [facility]     CARDIAC VALVE SURGERY  02/08/2017    22 mm Medtronic AV, 28 mm TV Medtronic annuloplaty ring    COLONOSCOPY N/A 8/27/2019    Procedure: COLONOSCOPY;  Surgeon: Addie John MD;  Location: [facility] ENDO;  Service: Endoscopy;  Laterality: N/A;  dialysis pt *needs K*    COLONOSCOPY N/A 9/3/2020    Procedure: COLONOSCOPY-need INR;  Surgeon: Jemma Youngblood MD;  Location: [facility] ENDO;  Service: Endoscopy;  Laterality: N/A;    ESOPHAGOGASTRODUODENOSCOPY N/A 8/27/2019    Procedure: EGD (ESOPHAGOGASTRODUODENOSCOPY);  Surgeon: Addie John MD;  Location: [facility] ENDO;  Service: Endoscopy;  Laterality: N/A;    RADIOFREQUENCY ABLATION  03/13/2017    Atrial tachycardia    REMOVAL OF GRAFT Left 7/2/2020    Procedure: REMOVAL, GRAFT;  Surgeon: Sascha Sidhu MD;  Location: [facility] OR;  Service: Peripheral Vascular;  Laterality: Left;     RIGHT HEART CATHETERIZATION Right 8/12/2021    Procedure: INSERTION, CATHETER, RIGHT HEART;  Surgeon: Mariah Pierce MD;  Location: [facility] CATH LAB;  Service: Cardiology;  Laterality: Right;     Social History     Socioeconomic History    Marital status: Single   Tobacco Use    Smoking status: Never    Smokeless tobacco: Never   Substance and Sexual Activity    Alcohol use: No     Comment: quit in 2016; does have heavy drinking history; started drinking at age 13; was drinking a 12 pack over the weekend and two 24 ounces of beer a day during the week along with a pint of hard alcohol    Drug use: No    Sexual activity: Not Currently   Social History Narrative    Caregiver Nurse Marjan Swain; 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     Review of patient's allergies indicates:  No Known Allergies  Current Outpatient Medications   Medication Sig    amLODIPine (NORVASC) 10 MG tablet Take 1 tablet (10 mg total) by mouth once daily.    amLODIPine (NORVASC) 10 MG tablet take one tablet by mouth daily    amoxicillin-clavulanate 500-125mg (AUGMENTIN) 500-125 mg Tab Take 1 tablet (500 mg total) by mouth once daily.    ascorbic acid, vitamin C, (VITAMIN C) 500 MG tablet Take 1 tablet (500 mg total) by mouth 2 (two) times daily.    b complex vitamins tablet Take 1 tablet by mouth once daily.    calcium acetate,phosphat bind, (PHOSLO) 667 mg capsule Take 2 capsules three times a day with each meal and one capsule twice a day with a snack    calcium acetate,phosphat bind, (PHOSLO) 667 mg tablet Take 1 tablet by mouth three times daily with meals    calcium acetate,phosphat bind, (PHOSLO) 667 mg tablet Take 3 capsules by mouth three times daily with meals AND take 1 capsule by mouth twice daily with snacks    collagen, bovine, 100 % Powd Apply topically.    dialysis solutions (PERITONEAL DIALYSIS SOLUTION IP) Inject into the peritoneum every Mon, Wed, Fri. Lt [facility]NA [facility] on M, W, F.     diltiaZEM (TIADYLT ER) 420 MG Cs24 Take 1 capsule (420 mg total) by mouth once daily.    epoetin vikki (PROCRIT) 10,000 unit/mL injection Inject 1 mL (10,000 Units total) into the skin every Mon, Wed, Fri. To be given with HD    ferrous sulfate 324 mg (65 mg iron) TbEC Take 325 mg by mouth once daily.    fish oil-omega-3 fatty acids 300-1,000 mg capsule Take 2 g by mouth once daily.    hydrALAZINE (APRESOLINE) 100 MG tablet Take 1 tablet (100 mg total) by mouth 3 (three) times daily including dialysis.    hydrALAZINE (APRESOLINE) 100 MG tablet take one tablet by mouth three times a day including dialysis days    hydroCHLOROthiazide (MICROZIDE) 12.5 mg capsule     hydrOXYzine HCL (ATARAX) 25 MG tablet Take 1 tablet by mouth daily as needed for itching    hydrOXYzine HCL (ATARAX) 25 MG tablet take one tablet by mouth daily as needed for itching    labetaloL (NORMODYNE) 200 MG tablet Take 1 tablet by mouth three times daily    labetaloL (NORMODYNE) 200 MG tablet Take 1 tablet (200 mg total) by mouth 3 (three) times daily.    labetaloL (NORMODYNE) 200 MG tablet Take 1 tablet (200 mg total) by mouth 3 (three) times daily.    lisinopriL (PRINIVIL,ZESTRIL) 40 MG tablet Take 1 tablet (40 mg total) by mouth once daily.    multivitamin with minerals tablet Take 1 tablet by mouth once daily.    pantoprazole (PROTONIX) 40 MG tablet TAKE 1 TABLET BY MOUTH ONCE DAILY    pantoprazole (PROTONIX) 40 MG tablet Take 1 tablet (40 mg total) by mouth qd    pantoprazole (PROTONIX) 40 MG tablet Take 1 tablet by mouth once daily    pep injection Inject 0.3 ml as directed     For compounding pharmacy use:   Add PAPAVERINE 30 mcg  Add PHENTOLAMINE 10 mg  Add ALPROSTADIL 100 mcg    ETHAN-RACQUEL RX 1- mg-mg-mcg Tab TAKE ONE TABLET BY MOUTH EVERY DAY    sildenafiL (VIAGRA) 100 MG tablet Take 1 tablet (100 mg total) by mouth daily as needed for Erectile Dysfunction.    tenofovir disoproxil fumarate (VIREAD) 300 mg Tab TAKE 1  TABLET BY MOUTH ONCE WEEKLY    triamcinolone acetonide 0.1% (KENALOG) 0.1 % ointment Apply topically 2 (two) times daily.    vit b cmplx 3-fa-vit c-biotin 1- mg-mg-mcg (ETHAN-RACQUEL RX) 1- mg-mg-mcg Tab Take 1 tablet by mouth once daily.    vitamin D (VITAMIN D3) 1000 units Tab Take 1 tablet by mouth daily    warfarin (COUMADIN) 1 MG tablet     warfarin (COUMADIN) 7.5 MG tablet Take 2 tablets by mouth on Tuesday, Thursdays, and Saturday; and 1 tablet on all other days of the week or as directed by coumadin clinic. (Patient taking differently: Take 1 tablet (7.5 mg) by mouth on Mondays and Fridays; and 1 1/2 tablet (11.25 mg) on all other days of the week or as directed by coumadin clinic.)    albuterol (VENTOLIN HFA) 90 mcg/actuation inhaler Inhale 2 puffs into the lungs every 6 (six) hours as needed for Wheezing or Shortness of Breath. Rescue    methylPREDNISolone (MEDROL DOSEPACK) 4 mg tablet Follow package directions    omega-3 fatty acids-vitamin E (FISH OIL) 1,000 mg Cap Take 1 capsule by mouth once daily.     No current facility-administered medications for this visit.     Facility-Administered Medications Ordered in Other Visits   Medication    0.9%  NaCl infusion    sodium chloride 0.9% flush 10 mL           Review of Systems   Constitutional:  Negative for activity change, appetite change, chills, diaphoresis, fatigue, fever and unexpected weight change.   HENT:  Negative for congestion, ear pain, postnasal drip, rhinorrhea, sinus pressure, sinus pain, sneezing, sore throat, tinnitus, trouble swallowing and voice change.    Eyes:  Negative for photophobia, pain and visual disturbance.   Respiratory:  Negative for cough, chest tightness, shortness of breath and wheezing.    Cardiovascular:  Negative for chest pain, palpitations and leg swelling.   Gastrointestinal:  Negative for abdominal distention, abdominal pain, constipation, diarrhea, nausea and vomiting.   Genitourinary:  Negative  for decreased urine volume, difficulty urinating, dysuria, flank pain, frequency, hematuria and urgency.   Musculoskeletal:  Positive for arthralgias. Negative for back pain, joint swelling, neck pain and neck stiffness.   Allergic/Immunologic: Negative for immunocompromised state.   Neurological:  Negative for dizziness, tremors, seizures, syncope, facial asymmetry, speech difficulty, weakness, light-headedness, numbness and headaches.   Hematological:  Negative for adenopathy. Does not bruise/bleed easily.   Psychiatric/Behavioral:  Negative for confusion and sleep disturbance.      Objective:      Physical Exam  Vitals reviewed.   Feet:      Comments: Tender/heel and top of foot/ no swelling noted  Neurological:      Mental Status: He is alert and oriented to person, place, and time.       Assessment:     Vitals:    11/02/23 1321   BP: 104/68   Pulse: 94   Temp: 97 °F (36.1 °C)         1. Right foot pain        Plan:   Right foot pain  -     Cancel: URIC ACID; Future; Expected date: 11/02/2023  -     URIC ACID; Future; Expected date: 11/02/2023    Other orders  -     methylPREDNISolone (MEDROL DOSEPACK) 4 mg tablet; Follow package directions  Dispense: 21 tablet; Refill: 0    If + uric acid - colchicine/low purine diet  Ice/elevate

## 2023-11-03 ENCOUNTER — TELEPHONE (OUTPATIENT)
Dept: HEPATOLOGY | Facility: CLINIC | Age: 53
End: 2023-11-03
Payer: MEDICARE

## 2023-11-03 NOTE — TELEPHONE ENCOUNTER
Attempted to contact patient with no answer. Left voicemail message for patient to return call.           Medication was refilled by provider on 11/03/2023

## 2023-11-03 NOTE — TELEPHONE ENCOUNTER
----- Message from Young Connolly LPN sent at 11/3/2023  9:48 AM CDT -----  Regarding: Refill  Good morning,    This patient reached out to me this morning and asked if I would contact your office in regards to a refill on his tenofovir.  He says he requested that the prescription be sent to [facility] location a few days ago. He says he is due to take the medication today.  He says he is out of the medication and has been waiting on a refill.  He says the pharmacy states the provider dc'd the medication.  Please contact patient to discuss.      Thanks,    Young Connolly LPN

## 2023-11-07 DIAGNOSIS — Z95.2 HISTORY OF MECHANICAL AORTIC VALVE REPLACEMENT: Primary | ICD-10-CM

## 2023-11-07 DIAGNOSIS — I47.10 SVT (SUPRAVENTRICULAR TACHYCARDIA): ICD-10-CM

## 2023-11-07 DIAGNOSIS — I27.20 PULMONARY HYPERTENSION: ICD-10-CM

## 2023-11-08 ENCOUNTER — ANTI-COAG VISIT (OUTPATIENT)
Dept: CARDIOLOGY | Facility: CLINIC | Age: 53
End: 2023-11-08
Payer: MEDICARE

## 2023-11-08 ENCOUNTER — OFFICE VISIT (OUTPATIENT)
Dept: CARDIOLOGY | Facility: CLINIC | Age: 53
End: 2023-11-08
Payer: MEDICARE

## 2023-11-08 ENCOUNTER — HOSPITAL ENCOUNTER (OUTPATIENT)
Dept: CARDIOLOGY | Facility: HOSPITAL | Age: 53
Discharge: HOME | End: 2023-11-08
Attending: INTERNAL MEDICINE
Payer: MEDICARE

## 2023-11-08 VITALS
SYSTOLIC BLOOD PRESSURE: 126 MMHG | HEART RATE: 79 BPM | DIASTOLIC BLOOD PRESSURE: 72 MMHG | WEIGHT: 221.81 LBS | OXYGEN SATURATION: 98 % | HEIGHT: 74 IN | BODY MASS INDEX: 28.47 KG/M2

## 2023-11-08 DIAGNOSIS — I47.10 SVT (SUPRAVENTRICULAR TACHYCARDIA): ICD-10-CM

## 2023-11-08 DIAGNOSIS — Z99.2 ESRD (END STAGE RENAL DISEASE) ON DIALYSIS: Chronic | ICD-10-CM

## 2023-11-08 DIAGNOSIS — Z79.01 ANTICOAGULATED: Primary | ICD-10-CM

## 2023-11-08 DIAGNOSIS — I27.20 PULMONARY HYPERTENSION: ICD-10-CM

## 2023-11-08 DIAGNOSIS — Z95.2 HISTORY OF MECHANICAL AORTIC VALVE REPLACEMENT: ICD-10-CM

## 2023-11-08 DIAGNOSIS — N18.6 ESRD (END STAGE RENAL DISEASE) ON DIALYSIS: Chronic | ICD-10-CM

## 2023-11-08 DIAGNOSIS — Q24.9 ADULT CONGENITAL HEART DISEASE: Primary | ICD-10-CM

## 2023-11-08 DIAGNOSIS — I47.19 ATRIAL TACHYCARDIA: ICD-10-CM

## 2023-11-08 LAB — INR PPP: 1.8 (ref 2–3)

## 2023-11-08 PROCEDURE — 99999 PR PBB SHADOW E&M-EST. PATIENT-LVL V: CPT | Mod: PBBFAC,,, | Performed by: INTERNAL MEDICINE

## 2023-11-08 PROCEDURE — 99205 OFFICE O/P NEW HI 60 MIN: CPT | Mod: S$PBB,,, | Performed by: INTERNAL MEDICINE

## 2023-11-08 PROCEDURE — 99999 PR PBB SHADOW E&M-EST. PATIENT-LVL V: ICD-10-PCS | Mod: PBBFAC,,, | Performed by: INTERNAL MEDICINE

## 2023-11-08 PROCEDURE — 93010 ELECTROCARDIOGRAM REPORT: CPT | Mod: ,,, | Performed by: INTERNAL MEDICINE

## 2023-11-08 PROCEDURE — 99999PBSHW POCT INR: Mod: PBBFAC,,,

## 2023-11-08 PROCEDURE — 93005 ELECTROCARDIOGRAM TRACING: CPT

## 2023-11-08 PROCEDURE — 93010 EKG 12-LEAD: ICD-10-PCS | Mod: ,,, | Performed by: INTERNAL MEDICINE

## 2023-11-08 PROCEDURE — 99999PBSHW POCT INR: ICD-10-PCS | Mod: PBBFAC,,,

## 2023-11-08 PROCEDURE — 99205 PR OFFICE/OUTPT VISIT, NEW, LEVL V, 60-74 MIN: ICD-10-PCS | Mod: S$PBB,,, | Performed by: INTERNAL MEDICINE

## 2023-11-08 PROCEDURE — 99215 OFFICE O/P EST HI 40 MIN: CPT | Mod: PBBFAC | Performed by: INTERNAL MEDICINE

## 2023-11-08 PROCEDURE — 85610 PROTHROMBIN TIME: CPT | Mod: PBBFAC

## 2023-11-08 NOTE — PROGRESS NOTES
INR not at goal-1.8, just under goal. Medications, chart, and patient findings reviewed. See calendar for adjustments to dose and follow up plan.  INR has been labile as of late.  He held 2 doses for INR of 6.0, we will resume with the lower planned dose and repeat INR in 1 week.

## 2023-11-08 NOTE — PROGRESS NOTES
Subjective:   Patient ID:  Isidro White Jr. is a 53 y.o. male who presents for evaluation of SVT      53 yoM s/p [facility] AVR, ESRD, ASD repair here for arrhythmia management. He had ablation 3/17 for TA AT by Dr Guallpa. He has since undergone [facility] AVR surgery. He has had increased amounts of palpitations. He has tachycardia during HD sessions. He had documented tachycardia on his recent event monitor 10/23 with sustained arrhythmia for nearly 15 minutes.      Ablation 3/17 (Saloni):  CONCLUSIONS:   1.  History of narrow and wide complex tachycardia.   2.  Atrial tachycardia with and without aberrancy, easily induced with atrial single extrastimuli, and mapped to the 10 o'clock aspect of the tricuspid valve annulus.   3.  AT eradication within 5 seconds of lesion onset in this area.   4.  No evidence of dual AV node physiology or concealed accessory pathway.     Event monitor 10/23:  The patient was monitored for a total of 6d 23h, underlying rhythm is sinus.  The minimum heart rate was 66 bpm; the maximum 177 bpm; the average 86 bpm.  0 % of Atrial fibrillation/Atrial flutter with longest episode of 0 ms.  The total burden of AV Block present was 0 %   There were 0 pauses  Total count of Ventricular Tachycardia (VT): 259 episode(s). Longest VT: 70 beats on Day 3 / 10:33:35 am. Fastest VT: 161 bpm on Day 3 / 10:33:35 am.  2888 supraventricular episodes were found. Longest SVT Episode 14min 39s, Fastest  bpm  There were a total of 5276 PVCs with 2 morphologies and 242 couplets. Overall PVC [place] at 0.6 %  There were a total of 0 Other Beats. There were 0 total number of paced beats. There were a total of 364252 PSVCs with 1 morphologies and 2845 couplets. Overall PSVC [place] at 15.88 %  There is a total of 0 patient events.    Echo 4/23:  · The left ventricle is normal in size with concentric remodeling and low normal systolic function.  · Mild left atrial enlargement.  · Grade I left ventricular diastolic  dysfunction.  · The estimated PA systolic pressure is 47 mmHg.  · Normal right ventricular size with normal right ventricular systolic function.  · There is pulmonary hypertension.  · Normal central venous pressure (3 mmHg).  · There is a bileaflet tilting disc mechanical aortic valve present. Prosthetic aortic valve is normal.  · The aortic valve mean gradient is 19 mmHg with a dimensionless index of 0.47.  · The estimated ejection fraction is 50%.  · The mean diastolic gradient across the mitral valve is 11 mmHg at a heart rate of 76 bpm.  · There is mild to moderate mitral stenosis.  · Mild to moderate tricuspid regurgitation.    Past Medical History:  No date: Anemia  No date: Aortic valve stenosis      Comment:  s/p mechanical AVR  No date: Atrial fibrillation  No date: Atrial flutter  No date: Cardiomyopathy  No date: CHF (congestive heart failure)  No date: Drug-induced erectile dysfunction  No date: ESRD due to hypertension      Comment:  HD M, W, F  No date: ESRD on dialysis  No date: GERD (gastroesophageal reflux disease)  No date: Hepatitis B  No date: Hyperlipidemia  No date: Hypertension  No date: Nightmares  No date: Obesity  11/12/2019: DANIEL (obstructive sleep apnea)  No date: Secondary hyperparathyroidism of renal origin  No date: Supraventricular tachycardia      Comment:  atrial tachycardia  No date: Tachycardia  No date: Valvular regurgitation      Comment:  AI, TR    Past Surgical History:  No date: ASD REPAIR      Comment:  age 8 [facility]  03/2017: AV Graft Creation; Left  No date: CARDIAC CATHETERIZATION      Comment:  [facility]   02/08/2017: CARDIAC VALVE SURGERY      Comment:  22 mm Medtronic AV, 28 mm TV Medtronic annuloplaty ring  8/27/2019: COLONOSCOPY; N/A      Comment:  Procedure: COLONOSCOPY;  Surgeon: Addie John MD;                 Location: [facility];  Service: Endoscopy;  Laterality:                N/A;  dialysis pt *needs K*  9/3/2020: COLONOSCOPY; N/A      Comment:   Procedure: COLONOSCOPY-need INR;  Surgeon: Jemma Youngblood MD;  Location: [facility] ENDO;  Service: Endoscopy;                 Laterality: N/A;  8/27/2019: ESOPHAGOGASTRODUODENOSCOPY; N/A      Comment:  Procedure: EGD (ESOPHAGOGASTRODUODENOSCOPY);  Surgeon:                Addie John MD;  Location: [facility] ENDO;  Service:                Endoscopy;  Laterality: N/A;  03/13/2017: RADIOFREQUENCY ABLATION      Comment:  Atrial tachycardia  7/2/2020: REMOVAL OF GRAFT; Left      Comment:  Procedure: REMOVAL, GRAFT;  Surgeon: Sascha Sidhu MD;               Location: [facility] OR;  Service: Peripheral Vascular;                 Laterality: Left;  8/12/2021: RIGHT HEART CATHETERIZATION; Right      Comment:  Procedure: INSERTION, CATHETER, RIGHT HEART;  Surgeon:                Mariah Pierce MD;  Location: [facility] CATH LAB;  Service:                Cardiology;  Laterality: Right;    Social History    Socioeconomic History      Marital status: Single    Tobacco Use      Smoking status: Never      Smokeless tobacco: Never    Substance and Sexual Activity      Alcohol use: No        Comment: quit in 2016; does have heavy drinking history; started drinking at age 13; was drinking a 12 pack over the weekend and two 24 ounces of beer a day during the week along with a pint of hard alcohol      Drug use: No      Sexual activity: Not Currently    Social History Narrative      Caregiver Nurse Marjan Swain; 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      Review of patient's family history indicates:  Problem: Leukemia      Relation: Mother          Age of Onset: (Not Specified)  Problem: Cancer      Relation: Mother          Age of Onset: (Not Specified)  Problem: Heart attack      Relation: Father          Age of Onset: (Not Specified)          Comment: massive MI at age 67  Problem: No Known Problems      Relation: Sister          Age of Onset: (Not Specified)  Problem: No Known Problems      Relation: Brother          Age of Onset:  (Not Specified)  Problem: No Known Problems      Relation: Sister          Age of Onset: (Not Specified)  Problem: No Known Problems      Relation: Sister          Age of Onset: (Not Specified)  Problem: Heart failure      Relation: Paternal Grandmother          Age of Onset: (Not Specified)  Problem: Diabetes      Relation: Paternal Aunt          Age of Onset: (Not Specified)  Problem: Hypertension      Relation: Paternal Aunt          Age of Onset: (Not Specified)  Problem: Leukemia      Relation: Paternal Aunt          Age of Onset: (Not Specified)          Comment: CLL  Problem: Suicide      Relation: Paternal Uncle          Age of Onset: (Not Specified)  Problem: Anesthesia problems      Relation: Paternal Uncle          Age of Onset: (Not Specified)  Problem: Diabetes      Relation: Paternal Aunt          Age of Onset: (Not Specified)  Problem: Stroke      Relation: Paternal Aunt          Age of Onset: (Not Specified)  Problem: Valvular heart disease      Relation: Maternal Aunt          Age of Onset: (Not Specified)  Problem: Kidney disease      Relation: Neg Hx          Age of Onset: (Not Specified)  Problem: Colon cancer      Relation: Neg Hx          Age of Onset: (Not Specified)      Current Outpatient Medications:  albuterol (VENTOLIN HFA) 90 mcg/actuation inhaler, Inhale 2 puffs into the lungs every 6 (six) hours as needed for Wheezing or Shortness of Breath. Rescue, Disp: 18 g, Rfl: 3  amLODIPine (NORVASC) 10 MG tablet, Take 1 tablet (10 mg total) by mouth once daily., Disp: 30 tablet, Rfl: 6  amLODIPine (NORVASC) 10 MG tablet, take one tablet by mouth daily, Disp: 90 tablet, Rfl: 3  amoxicillin-clavulanate 500-125mg (AUGMENTIN) 500-125 mg Tab, Take 1 tablet (500 mg total) by mouth once daily., Disp: 14 tablet, Rfl: 0  ascorbic acid, vitamin C, (VITAMIN C) 500 MG tablet, Take 1 tablet (500 mg total) by mouth 2 (two) times daily., Disp: , Rfl:   b complex vitamins tablet, Take 1 tablet by mouth once  daily., Disp: , Rfl:    calcium acetate,phosphat bind, (PHOSLO) 667 mg capsule, Take 2 capsules three times a day with each meal and one capsule twice a day with a snack, Disp: 240 capsule, Rfl: 12  calcium acetate,phosphat bind, (PHOSLO) 667 mg tablet, Take 1 tablet by mouth three times daily with meals, Disp: 90 tablet, Rfl: 6  calcium acetate,phosphat bind, (PHOSLO) 667 mg tablet, Take 3 capsules by mouth three times daily with meals AND take 1 capsule by mouth twice daily with snacks, Disp: 330 tablet, Rfl: 12  collagen, bovine, 100 % Powd, Apply topically., Disp: , Rfl:   dialysis solutions (PERITONEAL DIALYSIS SOLUTION IP), Inject into the peritoneum every Mon, Wed, Fri. Lt FA Fistula, [facility] on M, W, F., Disp: , Rfl:   diltiaZEM (TIADYLT ER) 420 MG Cs24, Take 1 capsule (420 mg total) by mouth once daily., Disp: 90 capsule, Rfl: 1  epoetin vikki (PROCRIT) 10,000 unit/mL injection, Inject 1 mL (10,000 Units total) into the skin every Mon, Wed, Fri. To be given with HD, Disp: , Rfl:   ferrous sulfate 324 mg (65 mg iron) TbEC, Take 325 mg by mouth once daily., Disp: , Rfl:   fish oil-omega-3 fatty acids 300-1,000 mg capsule, Take 2 g by mouth once daily., Disp: , Rfl:   hydrALAZINE (APRESOLINE) 100 MG tablet, Take 1 tablet (100 mg total) by mouth 3 (three) times daily including dialysis., Disp: 90 tablet, Rfl: 6  hydrALAZINE (APRESOLINE) 100 MG tablet, take one tablet by mouth three times a day including dialysis days, Disp: 270 tablet, Rfl: 3  hydroCHLOROthiazide (MICROZIDE) 12.5 mg capsule, , Disp: , Rfl:   hydrOXYzine HCL (ATARAX) 25 MG tablet, Take 1 tablet by mouth daily as needed for itching, Disp: 30 tablet, Rfl: 6  hydrOXYzine HCL (ATARAX) 25 MG tablet, take one tablet by mouth daily as needed for itching, Disp: 90 tablet, Rfl: 3  labetaloL (NORMODYNE) 200 MG tablet, Take 1 tablet by mouth three times daily, Disp: 90 tablet, Rfl: 6  labetaloL (NORMODYNE) 200 MG tablet, Take 1 tablet (200  mg total) by mouth 3 (three) times daily., Disp: 90 tablet, Rfl: 6  labetaloL (NORMODYNE) 200 MG tablet, Take 1 tablet (200 mg total) by mouth 3 (three) times daily., Disp: 90 tablet, Rfl: 6  lisinopriL (PRINIVIL,ZESTRIL) 40 MG tablet, Take 1 tablet (40 mg total) by mouth once daily., Disp: 90 tablet, Rfl: 1  methylPREDNISolone (MEDROL DOSEPACK) 4 mg tablet, Follow package directions, Disp: 21 tablet, Rfl: 0  multivitamin with minerals tablet, Take 1 tablet by mouth once daily., Disp: , Rfl:   omega-3 fatty acids-vitamin E (FISH OIL) 1,000 mg Cap, Take 1 capsule by mouth once daily., Disp: , Rfl: 0  pantoprazole (PROTONIX) 40 MG tablet, TAKE 1 TABLET BY MOUTH ONCE DAILY, Disp: 30 tablet, Rfl: 6  pantoprazole (PROTONIX) 40 MG tablet, Take 1 tablet (40 mg total) by mouth qd, Disp: 30 tablet, Rfl: 6  pantoprazole (PROTONIX) 40 MG tablet, Take 1 tablet by mouth once daily, Disp: 90 tablet, Rfl: 3  pep injection, Inject 0.3 ml as directed For compounding pharmacy use: Add PAPAVERINE 30 mcgAdd PHENTOLAMINE 10 mgAdd ALPROSTADIL 100 mcg, Disp: 10 vial, Rfl: 10  ETHAN-RACQUEL RX 1- mg-mg-mcg Tab, TAKE ONE TABLET BY MOUTH EVERY DAY, Disp: 90 tablet, Rfl: 1  sildenafiL (VIAGRA) 100 MG tablet, Take 1 tablet (100 mg total) by mouth daily as needed for Erectile Dysfunction., Disp: 10 tablet, Rfl: 1  tenofovir disoproxil fumarate (VIREAD) 300 mg Tab, TAKE 1 TABLET BY MOUTH ONCE WEEKLY, Disp: 4 tablet, Rfl: 1  tenofovir disoproxil fumarate (VIREAD) 300 mg Tab, Take 1 tablet by mouth weekly, Disp: 4 tablet, Rfl: 1  triamcinolone acetonide 0.1% (KENALOG) 0.1 % ointment, Apply topically 2 (two) times daily., Disp: 80 g, Rfl: 0  vit b cmplx 3-fa-vit c-biotin 1- mg-mg-mcg (ETHAN-RACQUEL RX) 1- mg-mg-mcg Tab, Take 1 tablet by mouth once daily., Disp: 90 tablet, Rfl: 2  vitamin D (VITAMIN D3) 1000 units Tab, Take 1 tablet by mouth daily, Disp: 30 tablet, Rfl: 6  warfarin (COUMADIN) 1 MG tablet, , Disp: , Rfl:   warfarin  (COUMADIN) 7.5 MG tablet, Take 2 tablets by mouth on Tuesday, Thursdays, and Saturday; and 1 tablet on all other days of the week or as directed by coumadin clinic. (Patient taking differently: Take 1 tablet (7.5 mg) by mouth on Mondays and Fridays; and 1 1/2 tablet (11.25 mg) on all other days of the week or as directed by coumadin clinic.), Disp: 45 tablet, Rfl: 6    No current facility-administered medications for this visit.  Facility-Administered Medications Ordered in Other Visits:  0.9%  NaCl infusion, , Intravenous, Continuous, Christopher Jane MD  sodium chloride 0.9% flush 10 mL, 10 mL, Intravenous, PRN, Christopher Jane MD          Review of Systems   Constitutional: Negative.   HENT: Negative.     Eyes: Negative.    Cardiovascular:  Negative for chest pain, dyspnea on exertion, leg swelling, near-syncope, palpitations and syncope.   Respiratory: Negative.  Negative for shortness of breath.    Endocrine: Negative.    Hematologic/Lymphatic: Negative.    Skin: Negative.    Musculoskeletal: Negative.    Gastrointestinal: Negative.    Genitourinary: Negative.    Neurological: Negative.  Negative for dizziness and light-headedness.   Psychiatric/Behavioral: Negative.     Allergic/Immunologic: Negative.        Objective:   Physical Exam  Vitals and nursing note reviewed.   Constitutional:       General: He is not in acute distress.     Appearance: He is well-developed. He is not diaphoretic.   HENT:      Head: Normocephalic and atraumatic.      Nose: Nose normal.   Eyes:      General: No scleral icterus.     Conjunctiva/sclera: Conjunctivae normal.   Neck:      Thyroid: No thyromegaly.      Vascular: No JVD.   Cardiovascular:      Rate and Rhythm: Normal rate. Rhythm irregular.      Heart sounds: S1 normal and S2 normal. Murmur heard.      Midsystolic murmur is present at the upper right sternal border.      No friction rub. No gallop. No S3 or S4 sounds.      Comments: Crisp S2  Pulmonary:      Effort: Pulmonary  effort is normal. No respiratory distress.      Breath sounds: Normal breath sounds. No stridor. No wheezing or rales.   Chest:      Chest wall: No tenderness.   Abdominal:      General: Bowel sounds are normal. There is no distension.      Palpations: Abdomen is soft. There is no mass.      Tenderness: There is no abdominal tenderness. There is no rebound.   Genitourinary:     Comments: Deferred  Musculoskeletal:         General: No tenderness or deformity. Normal range of motion.      Cervical back: Normal range of motion and neck supple.   Lymphadenopathy:      Cervical: No cervical adenopathy.   Skin:     General: Skin is warm and dry.      Coloration: Skin is not pale.      Findings: No erythema or rash.   Neurological:      Mental Status: He is alert and oriented to person, place, and time.      Motor: No abnormal muscle tone.      Coordination: Coordination normal.   Psychiatric:         Behavior: Behavior normal.         Thought Content: Thought content normal.         Judgment: Judgment normal.         Assessment:      1. Adult congenital heart disease    2. Atrial tachycardia    3. ESRD (end stage renal disease) on dialysis    4. History of mechanical aortic valve replacement        Plan:     53 yoM AT s.p [facility] here for arrhythmia management. He has documented, symptomatic tachycardia. This could be recurrence of his old arrhythmia or could be a new source (AFL, R atriotomy, perimitral). I offered ablation but he was not willing to commit to the procedure. He asked to return before the start of the year. RTC 6w

## 2023-11-16 ENCOUNTER — ANTI-COAG VISIT (OUTPATIENT)
Dept: CARDIOLOGY | Facility: CLINIC | Age: 53
End: 2023-11-16
Payer: MEDICARE

## 2023-11-16 DIAGNOSIS — Z79.01 LONG TERM (CURRENT) USE OF ANTICOAGULANTS: Primary | ICD-10-CM

## 2023-11-16 DIAGNOSIS — Z95.2 HISTORY OF MECHANICAL AORTIC VALVE REPLACEMENT: ICD-10-CM

## 2023-11-16 DIAGNOSIS — Z79.01 ANTICOAGULATED: ICD-10-CM

## 2023-11-16 LAB — INR PPP: 3.6 (ref 2–3)

## 2023-11-16 PROCEDURE — 93793 ANTICOAG MGMT PT WARFARIN: CPT | Mod: ,,,

## 2023-11-16 PROCEDURE — 99999PBSHW POCT INR: Mod: PBBFAC,,,

## 2023-11-16 PROCEDURE — 85610 PROTHROMBIN TIME: CPT | Mod: PBBFAC

## 2023-11-16 PROCEDURE — 93793 PR ANTICOAGULANT MGMT FOR PT TAKING WARFARIN: ICD-10-PCS | Mod: ,,,

## 2023-11-16 PROCEDURE — 99999PBSHW POCT INR: ICD-10-PCS | Mod: PBBFAC,,,

## 2023-11-16 NOTE — PROGRESS NOTES
Patient's INR is supra-therapeutic at 3.6.  Patient reports he has not been incorporating greens in his diet.    Advised of increased risk of bleeding; signs/symptoms of bleeding and need to go to ED if he experiences any.  Patient reports he is not having any signs/symptoms of bleeding.  Instructions given:  Hold warfarin today-11/16/23; then re-challenge warfarin 7.5 mg on Fridays, Mondays and Wednesdays; and 11.25 mg all other days.  Recheck on 11/28/23.  Calendar reviewed with patient.  Patient verbalizes understanding.

## 2023-11-22 ENCOUNTER — TELEPHONE (OUTPATIENT)
Dept: INTERNAL MEDICINE | Facility: CLINIC | Age: 53
End: 2023-11-22
Payer: MEDICARE

## 2023-11-22 NOTE — TELEPHONE ENCOUNTER
"Spoke with patient and advised him to go to the emergency room for severe or "excruciating" pain he is having in his hips. He verbalized understanding and stated that he is on his way now to an urgent care.  "

## 2023-11-22 NOTE — TELEPHONE ENCOUNTER
----- Message from Young Connolly LPN sent at 11/22/2023  2:56 PM CST -----  Patient would like a call from your office.  He states he is in excruciating pain in his hips and would like to be seen today.        Young Connolly. SAMIRA

## 2023-11-28 ENCOUNTER — ANTI-COAG VISIT (OUTPATIENT)
Dept: CARDIOLOGY | Facility: CLINIC | Age: 53
End: 2023-11-28
Payer: MEDICARE

## 2023-11-28 DIAGNOSIS — Z95.2 HISTORY OF MECHANICAL AORTIC VALVE REPLACEMENT: ICD-10-CM

## 2023-11-28 DIAGNOSIS — Z79.01 ANTICOAGULATED: ICD-10-CM

## 2023-11-28 DIAGNOSIS — Z79.01 LONG TERM (CURRENT) USE OF ANTICOAGULANTS: Primary | ICD-10-CM

## 2023-11-28 LAB — INR PPP: 2.1 (ref 2–3)

## 2023-11-28 PROCEDURE — 99999PBSHW POCT INR: ICD-10-PCS | Mod: PBBFAC,,,

## 2023-11-28 PROCEDURE — 85610 PROTHROMBIN TIME: CPT | Mod: PBBFAC

## 2023-11-28 PROCEDURE — 93793 ANTICOAG MGMT PT WARFARIN: CPT | Mod: ,,,

## 2023-11-28 PROCEDURE — 93793 PR ANTICOAGULANT MGMT FOR PT TAKING WARFARIN: ICD-10-PCS | Mod: ,,,

## 2023-11-28 PROCEDURE — 99999PBSHW POCT INR: Mod: PBBFAC,,,

## 2023-11-28 NOTE — PROGRESS NOTES
Patient's INR is therapeutic at 2.1.  Patient reports he followed previous instructions.  Patient reports no changes.  Instructions given: continue warfarin 7.5 mg on Wednesdays, Fridays and Mondays; and 11.25 mg all other days.  Recheck on 12/12/23.  Calendar reviewed with patient.  Patient verbalizes understanding.

## 2023-11-30 DIAGNOSIS — Z79.01 LONG TERM (CURRENT) USE OF ANTICOAGULANTS: ICD-10-CM

## 2023-11-30 RX ORDER — WARFARIN 7.5 MG/1
TABLET ORAL
Qty: 45 TABLET | Refills: 6 | Status: SHIPPED | OUTPATIENT
Start: 2023-11-30

## 2023-12-01 RX ORDER — VIT B COMP NO.3/FOLIC/C/BIOTIN 1 MG-60 MG
1 TABLET ORAL DAILY
Qty: 90 TABLET | Refills: 2 | OUTPATIENT
Start: 2023-12-01

## 2023-12-06 PROBLEM — R79.89 LOW VITAMIN D LEVEL: Status: ACTIVE | Noted: 2023-12-06

## 2023-12-06 PROBLEM — E78.1 HYPERTRIGLYCERIDEMIA: Status: ACTIVE | Noted: 2023-12-06

## 2023-12-06 PROBLEM — I48.91 ON COUMADIN FOR ATRIAL FIBRILLATION: Status: ACTIVE | Noted: 2019-06-18

## 2023-12-06 PROBLEM — B18.1 CHRONIC HEPATITIS B: Status: ACTIVE | Noted: 2023-12-06

## 2023-12-06 PROBLEM — R73.9 HYPERGLYCEMIA: Status: RESOLVED | Noted: 2017-02-09 | Resolved: 2023-12-06

## 2023-12-06 PROBLEM — Z79.01 ON COUMADIN FOR ATRIAL FIBRILLATION: Status: ACTIVE | Noted: 2019-06-18

## 2023-12-11 ENCOUNTER — TELEPHONE (OUTPATIENT)
Dept: CARDIOLOGY | Facility: CLINIC | Age: 53
End: 2023-12-11
Payer: MEDICARE

## 2023-12-11 NOTE — TELEPHONE ENCOUNTER
Patient contacted and advised will need to reschedule appointment due to provider will be out.  Patient has rescheduled appointment to 12/14/23.

## 2023-12-12 ENCOUNTER — OFFICE VISIT (OUTPATIENT)
Dept: INTERNAL MEDICINE | Facility: CLINIC | Age: 53
End: 2023-12-12
Payer: MEDICARE

## 2023-12-12 VITALS
HEIGHT: 74 IN | DIASTOLIC BLOOD PRESSURE: 80 MMHG | HEART RATE: 94 BPM | RESPIRATION RATE: 20 BRPM | BODY MASS INDEX: 28.52 KG/M2 | WEIGHT: 222.25 LBS | SYSTOLIC BLOOD PRESSURE: 144 MMHG

## 2023-12-12 DIAGNOSIS — I50.32 CHRONIC DIASTOLIC HEART FAILURE: ICD-10-CM

## 2023-12-12 DIAGNOSIS — Q24.9 ADULT CONGENITAL HEART DISEASE: ICD-10-CM

## 2023-12-12 DIAGNOSIS — F51.04 PSYCHOPHYSIOLOGICAL INSOMNIA: ICD-10-CM

## 2023-12-12 DIAGNOSIS — K74.60 HEPATIC CIRRHOSIS, UNSPECIFIED HEPATIC CIRRHOSIS TYPE, UNSPECIFIED WHETHER ASCITES PRESENT: ICD-10-CM

## 2023-12-12 DIAGNOSIS — I11.0 HYPERTENSIVE CARDIOMEGALY WITH HEART FAILURE: ICD-10-CM

## 2023-12-12 DIAGNOSIS — B18.1 CHRONIC HEPATITIS B: ICD-10-CM

## 2023-12-12 DIAGNOSIS — I48.91 ATRIAL FIBRILLATION, UNSPECIFIED TYPE: ICD-10-CM

## 2023-12-12 DIAGNOSIS — Z72.821 INADEQUATE SLEEP HYGIENE: ICD-10-CM

## 2023-12-12 DIAGNOSIS — N25.81 SECONDARY HYPERPARATHYROIDISM OF RENAL ORIGIN: ICD-10-CM

## 2023-12-12 DIAGNOSIS — G47.36 NOCTURNAL HYPOXEMIA DUE TO OBSTRUCTIVE CHRONIC BRONCHITIS: ICD-10-CM

## 2023-12-12 DIAGNOSIS — Z98.890 HISTORY OF RADIOFREQUENCY ABLATION FOR COMPLEX RIGHT ATRIAL ARRHYTHMIA: ICD-10-CM

## 2023-12-12 DIAGNOSIS — G89.29 CHRONIC FOOT PAIN, UNSPECIFIED LATERALITY: ICD-10-CM

## 2023-12-12 DIAGNOSIS — G47.33 OSA (OBSTRUCTIVE SLEEP APNEA): ICD-10-CM

## 2023-12-12 DIAGNOSIS — I70.0 AORTIC ATHEROSCLEROSIS: ICD-10-CM

## 2023-12-12 DIAGNOSIS — Z00.00 ENCOUNTER FOR PREVENTIVE HEALTH EXAMINATION: ICD-10-CM

## 2023-12-12 DIAGNOSIS — E78.1 HYPERTRIGLYCERIDEMIA: ICD-10-CM

## 2023-12-12 DIAGNOSIS — Z79.01 ON COUMADIN FOR ATRIAL FIBRILLATION: ICD-10-CM

## 2023-12-12 DIAGNOSIS — J43.9 MIXED RESTRICTIVE AND OBSTRUCTIVE LUNG DISEASE: ICD-10-CM

## 2023-12-12 DIAGNOSIS — N52.9 ERECTILE DYSFUNCTION, UNSPECIFIED ERECTILE DYSFUNCTION TYPE: ICD-10-CM

## 2023-12-12 DIAGNOSIS — D63.1 ANEMIA ASSOCIATED WITH CHRONIC RENAL FAILURE: ICD-10-CM

## 2023-12-12 DIAGNOSIS — J44.89 NOCTURNAL HYPOXEMIA DUE TO OBSTRUCTIVE CHRONIC BRONCHITIS: ICD-10-CM

## 2023-12-12 DIAGNOSIS — N28.1 RENAL CYST: ICD-10-CM

## 2023-12-12 DIAGNOSIS — R91.1 PULMONARY NODULE, LEFT: ICD-10-CM

## 2023-12-12 DIAGNOSIS — D69.6 THROMBOCYTOPENIA: ICD-10-CM

## 2023-12-12 DIAGNOSIS — Z86.010 HISTORY OF COLON POLYPS: ICD-10-CM

## 2023-12-12 DIAGNOSIS — N18.9 ANEMIA ASSOCIATED WITH CHRONIC RENAL FAILURE: ICD-10-CM

## 2023-12-12 DIAGNOSIS — N18.6 ESRD (END STAGE RENAL DISEASE) ON DIALYSIS: Chronic | ICD-10-CM

## 2023-12-12 DIAGNOSIS — M79.673 CHRONIC FOOT PAIN, UNSPECIFIED LATERALITY: ICD-10-CM

## 2023-12-12 DIAGNOSIS — I27.20 PULMONARY HTN: ICD-10-CM

## 2023-12-12 DIAGNOSIS — J98.4 MIXED RESTRICTIVE AND OBSTRUCTIVE LUNG DISEASE: ICD-10-CM

## 2023-12-12 DIAGNOSIS — Z99.2 ESRD (END STAGE RENAL DISEASE) ON DIALYSIS: Chronic | ICD-10-CM

## 2023-12-12 DIAGNOSIS — Z00.00 ENCOUNTER FOR PREVENTATIVE ADULT HEALTH CARE EXAMINATION: Primary | ICD-10-CM

## 2023-12-12 DIAGNOSIS — I27.9 CHRONIC PULMONARY HEART DISEASE: ICD-10-CM

## 2023-12-12 DIAGNOSIS — Z95.2 HISTORY OF MECHANICAL AORTIC VALVE REPLACEMENT: ICD-10-CM

## 2023-12-12 DIAGNOSIS — I47.19 ATRIAL TACHYCARDIA: ICD-10-CM

## 2023-12-12 DIAGNOSIS — R79.89 LOW VITAMIN D LEVEL: ICD-10-CM

## 2023-12-12 DIAGNOSIS — I95.81 POSTPROCEDURAL HYPOTENSION: ICD-10-CM

## 2023-12-12 DIAGNOSIS — I47.29 NSVT (NONSUSTAINED VENTRICULAR TACHYCARDIA): ICD-10-CM

## 2023-12-12 DIAGNOSIS — I48.91 ON COUMADIN FOR ATRIAL FIBRILLATION: ICD-10-CM

## 2023-12-12 PROBLEM — Z86.0100 HISTORY OF COLON POLYPS: Status: ACTIVE | Noted: 2023-12-12

## 2023-12-12 PROCEDURE — G0439 PR MEDICARE ANNUAL WELLNESS SUBSEQUENT VISIT: ICD-10-PCS | Mod: ,,, | Performed by: NURSE PRACTITIONER

## 2023-12-12 PROCEDURE — G0439 PPPS, SUBSEQ VISIT: HCPCS | Mod: ,,, | Performed by: NURSE PRACTITIONER

## 2023-12-12 PROCEDURE — 99215 OFFICE O/P EST HI 40 MIN: CPT | Mod: PBBFAC | Performed by: NURSE PRACTITIONER

## 2023-12-12 PROCEDURE — 99999 PR PBB SHADOW E&M-EST. PATIENT-LVL V: CPT | Mod: PBBFAC,,, | Performed by: NURSE PRACTITIONER

## 2023-12-12 PROCEDURE — 99999 PR PBB SHADOW E&M-EST. PATIENT-LVL V: ICD-10-PCS | Mod: PBBFAC,,, | Performed by: NURSE PRACTITIONER

## 2023-12-12 NOTE — PROGRESS NOTES
"  Isidro White presented for a  Medicare AWV and comprehensive Health Risk Assessment today. The following components were reviewed and updated:    Medical history  Family History  Social history  Allergies and Current Medications  Health Risk Assessment  Health Maintenance  Care Team         ** See Completed Assessments for Annual Wellness Visit within the encounter summary.**         The following assessments were completed:  Living Situation  CAGE  Depression Screening  Timed Get Up and Go  Whisper Test  Cognitive Function Screening  Nutrition Screening  ADL Screening  PAQ Screening    Please note patient noted to be on multiple vitamins, some possible repetitive. Patient instructed to bring his vitamins to dialysis tomorrow for his dialysis nurse/ doctor to review. Patient verbalized understanding.     Vitals:    12/12/23 1407   BP: (!) 144/80   BP Location: Right arm   Patient Position: Sitting   Pulse: 94   Resp: 20   Weight: 100.8 kg (222 lb 3.6 oz)   Height:    Pain scale  6' 2" (1.88 m)    0     Body mass index is 28.53 kg/m².  Physical Exam  Constitutional:       General: He is not in acute distress.     Appearance: He is not ill-appearing or diaphoretic.   HENT:      Mouth/Throat:      Mouth: Mucous membranes are moist.   Eyes:      General:         Right eye: No discharge.         Left eye: No discharge.      Conjunctiva/sclera: Conjunctivae normal.   Cardiovascular:      Rate and Rhythm: Normal rate. Rhythm irregular.      Comments: Click noted  LUE AV shunt    Pulmonary:      Effort: Pulmonary effort is normal. No respiratory distress.      Breath sounds: Normal breath sounds.   Genitourinary:     Comments: Patient states he no longer voids  Skin:     General: Skin is warm and dry.   Neurological:      Mental Status: He is alert and oriented to person, place, and time. Mental status is at baseline.   Psychiatric:         Mood and Affect: Mood normal.         Behavior: Behavior normal.         Thought " Content: Thought content normal.         Judgment: Judgment normal.     Diagnoses and health risks identified today and associated recommendations/orders:    1. Encounter for preventative adult health care examination  Review for opioid screening: Patient does not have Rx for Opioids  Review for substance use disorder: Patient does not use substance per chart    Patient states he drinks occasional beer when he watches football- maybe 8-10 beers a month    I offered to discuss advanced care planning, including how to pick a person who would make decisions for you if you were unable to make them for yourself, called a health care power of , and what kind of decisions you might make such as use of life sustaining treatments such as ventilators and tube feeding when faced with a life limiting illness recorded on a living will that they will need to know. (How you want to be cared for as you near the end of your natural life)     X Patient is interested in learning more about how to make advanced directives.  I provided them paperwork and offered to discuss this with them.    - Ambulatory referral/consult to Outpatient Case Management- ? [employer] membership benefits    2. Mixed restrictive and obstructive lung disease, Pulmonary nodule, left, Nocturnal hypoxemia due to obstructive chronic bronchitis, DANIEL (obstructive sleep apnea)  Monitor  Follow up as directed    3. Hypertensive cardiomegaly with heart failure  Monitor  Follow up with Cardiology  Continue home cardizem, hydralazine, HCTZ, labetalol    4. Chronic pulmonary heart disease, Adult congenital heart disease, Chronic diastolic heart failure, Pulmonary HTN, Postprocedural hypotension  Monitor  Follow up with Cardiololgy    5. Atrial fibrillation, unspecified type, On Coumadin for atrial fibrillation, Atrial tachycardia, History of radiofrequency ablation for complex right atrial arrhythmia, NSVT (nonsustained ventricular tachycardia), History of mechanical  "aortic valve replacement  Monitor  Follow up with Cardiology  Continue home coumadin, cardizem    6.  Aortic atherosclerosis, Hypertriglyceridemia  Monitor  Follow up with PCP, Cardiology  Continue home fish oil     7. ESRD (end stage renal disease) on dialysis, Secondary hyperparathyroidism of renal origin, Anemia associated with chronic renal failure    Monitor  Patient states he goes to dialysis Q M,W,F  Patient states he no longer voids  Follow up with Nephrology  Continue home Vitamin C, Renavite, complex B vitamin, phoslo, ferrous sulfate, atarax, MVI  - Ambulatory referral/consult to Pharmacy Assistance; Future- Leeanna-nataly    8. Chronic hepatitis B, Hepatic cirrhosis  Monitor  Follow up  as directed  Continue home Vinread    9. Erectile dysfunction, unspecified erectile dysfunction type, Psychophysiological insomnia, Inadequate sleep hygiene  Monitor  Follow up with Urology   Continue home Pep injection, viagra     10. Renal cyst  Monitor  Follow up as directed    11. Low vitamin D level  Monitor  Follow up with PCP, Nephrology  Continue home Vitamin D3    12. Chronic foot pain, unspecified laterality  Monitor  Patient reports ongoing issues with foot pain due to his "arches"  - Ambulatory referral/consult to Podiatry; Future    13. History of colon polyps  Monitor  Follow up with GI as directed    14. Thrombocytopenia  Monitor  Follow up with PCP                            Provided Isidro with a 5-10 year written screening schedule and personal prevention plan. Recommendations were developed using the USPSTF age appropriate recommendations. Education, counseling, and referrals were provided as needed. After Visit Summary printed and given to patient which includes a list of additional screenings\tests needed.    Follow up for Annual wellness visit.    VERENA Luke    "

## 2023-12-13 ENCOUNTER — TELEPHONE (OUTPATIENT)
Dept: PHARMACY | Facility: CLINIC | Age: 53
End: 2023-12-13
Payer: MEDICARE

## 2023-12-13 NOTE — TELEPHONE ENCOUNTER
Unfortunately, The Pharmacy Patient Assistance Team is unable to assist Mr. White at this time due to the following reasons      There is no Manufacture or Co-pay Savings Programs available for requested medication.           Pharmacy Patient Assistance Team

## 2023-12-14 ENCOUNTER — OFFICE VISIT (OUTPATIENT)
Dept: PODIATRY | Facility: CLINIC | Age: 53
End: 2023-12-14
Payer: MEDICARE

## 2023-12-14 VITALS — BODY MASS INDEX: 28.52 KG/M2 | HEIGHT: 74 IN | WEIGHT: 222.25 LBS

## 2023-12-14 DIAGNOSIS — M79.673 CHRONIC FOOT PAIN, UNSPECIFIED LATERALITY: ICD-10-CM

## 2023-12-14 DIAGNOSIS — G89.29 CHRONIC FOOT PAIN, UNSPECIFIED LATERALITY: ICD-10-CM

## 2023-12-14 DIAGNOSIS — M21.42 BILATERAL PES PLANUS: Primary | ICD-10-CM

## 2023-12-14 DIAGNOSIS — M21.41 BILATERAL PES PLANUS: Primary | ICD-10-CM

## 2023-12-14 PROCEDURE — 99214 OFFICE O/P EST MOD 30 MIN: CPT | Mod: PBBFAC | Performed by: PODIATRIST

## 2023-12-14 PROCEDURE — 99999 PR PBB SHADOW E&M-EST. PATIENT-LVL IV: CPT | Mod: PBBFAC,,, | Performed by: PODIATRIST

## 2023-12-14 PROCEDURE — 99203 OFFICE O/P NEW LOW 30 MIN: CPT | Mod: S$PBB,,, | Performed by: PODIATRIST

## 2023-12-14 PROCEDURE — 99999 PR PBB SHADOW E&M-EST. PATIENT-LVL IV: ICD-10-PCS | Mod: PBBFAC,,, | Performed by: PODIATRIST

## 2023-12-14 PROCEDURE — 99203 PR OFFICE/OUTPT VISIT, NEW, LEVL III, 30-44 MIN: ICD-10-PCS | Mod: S$PBB,,, | Performed by: PODIATRIST

## 2023-12-14 NOTE — PROGRESS NOTES
Subjective:     Patient ID: Isidro White Jr. is a 53 y.o. male.    Chief Complaint: Foot Pain (Pt c/o BL foot/arch pain  5/10, non-diabetic pt wears tennis shoes, PCP Dr. Nevarez last seen 12-12-23)    Isidro is a 53 y.o. male who presents to the podiatry clinic  with complaint of  bilateral foot pain. Onset of the symptoms was several years ago. Precipitating event: none known. Current symptoms include: ability to bear weight, but with some pain. Aggravating factors:  after standing or walking for long period of time . Symptoms have been intermittent. Patients rates pain 5/10 on pain scale. Patient points to bilateral medial arches. Patient has no other pedal complaints at this time.     Patient Active Problem List   Diagnosis    Essential hypertension    Hypertensive cardiomegaly with heart failure    Pulmonary HTN    Adult congenital heart disease    History of mechanical aortic valve replacement    Postprocedural hypotension    ESRD (end stage renal disease) on dialysis    Severe protein-calorie malnutrition    Chronic diastolic heart failure    Atrial tachycardia    History of radiofrequency ablation for complex right atrial arrhythmia    Erectile dysfunction    Chronic pulmonary heart disease    Secondary hyperparathyroidism of renal origin    Atrial fibrillation    On Coumadin for atrial fibrillation    Encounter for colorectal cancer screening    Colon cancer screening    Special screening for malignant neoplasms, colon    Abnormal diffusion capacity determined by pulmonary function test    Mixed restrictive and obstructive lung disease    DANIEL (obstructive sleep apnea)    Nocturnal hypoxemia due to obstructive chronic bronchitis    Pulmonary nodule, left    Psychophysiological insomnia    Primary snoring    Periodic limb movement disorder (PLMD)    Inadequate sleep hygiene    Arteriovenous graft infection    NSVT (nonsustained ventricular tachycardia)    Bleeding from dialysis shunt    Disorder of  electrolytes    Anemia associated with chronic renal failure    Hepatic cirrhosis    Gross hematuria    Renal cyst    Aortic atherosclerosis    Chronic hepatitis B    Low vitamin D level    Hypertriglyceridemia    Chronic foot pain    History of colon polyps    Thrombocytopenia       Medication List with Changes/Refills   Current Medications    ALBUTEROL (VENTOLIN HFA) 90 MCG/ACTUATION INHALER    Inhale 2 puffs into the lungs every 6 (six) hours as needed for Wheezing or Shortness of Breath. Rescue    AMLODIPINE (NORVASC) 10 MG TABLET    take one tablet by mouth daily    ASCORBIC ACID, VITAMIN C, (VITAMIN C) 500 MG TABLET    Take 1 tablet (500 mg total) by mouth 2 (two) times daily.    B COMPLEX VITAMINS TABLET    Take 1 tablet by mouth once daily.    B COMPLEX-VITAMIN C-FOLIC ACID (ETHAN-RACQUEL) 0.8 MG TAB    Take 1 tablet by mouth once daily    CALCIUM ACETATE,PHOSPHAT BIND, (PHOSLO) 667 MG TABLET    Take 3 capsules by mouth three times daily with meals AND take 1 capsule by mouth twice daily with snacks    DIALYSIS SOLUTIONS (PERITONEAL DIALYSIS SOLUTION IP)    Inject into the peritoneum every Mon, Wed, Fri. Lt FA Fistula, [facility] on M, W, F.    DILTIAZEM (TIADYLT ER) 420 MG CS24    Take 1 capsule (420 mg total) by mouth once daily.    EPOETIN TORRIE (PROCRIT) 10,000 UNIT/ML INJECTION    Inject 1 mL (10,000 Units total) into the skin every Mon, Wed, Fri. To be given with HD    FERROUS SULFATE 324 MG (65 MG IRON) TBEC    Take 325 mg by mouth once daily.    FISH OIL-OMEGA-3 FATTY ACIDS 300-1,000 MG CAPSULE    Take 2 g by mouth once daily.    HYDRALAZINE (APRESOLINE) 100 MG TABLET    take one tablet by mouth three times a day including dialysis days    HYDROCHLOROTHIAZIDE (MICROZIDE) 12.5 MG CAPSULE        HYDROXYZINE HCL (ATARAX) 25 MG TABLET    Take 1 tablet by mouth daily as needed for itching    HYDROXYZINE HCL (ATARAX) 25 MG TABLET    take one tablet by mouth daily as needed for itching    LABETALOL  (NORMODYNE) 200 MG TABLET    Take 1 tablet (200 mg total) by mouth 3 (three) times daily.    MULTIVITAMIN WITH MINERALS TABLET    Take 1 tablet by mouth once daily.    OMEGA-3 FATTY ACIDS-VITAMIN E (FISH OIL) 1,000 MG CAP    Take 1 capsule by mouth once daily.    PANTOPRAZOLE (PROTONIX) 40 MG TABLET    Take 1 tablet by mouth once daily    PEP INJECTION    Inject 0.3 ml as directed     For compounding pharmacy use:   Add PAPAVERINE 30 mcg  Add PHENTOLAMINE 10 mg  Add ALPROSTADIL 100 mcg    SILDENAFIL (VIAGRA) 100 MG TABLET    Take 1 tablet (100 mg total) by mouth daily as needed for Erectile Dysfunction.    TENOFOVIR DISOPROXIL FUMARATE (VIREAD) 300 MG TAB    TAKE 1 TABLET BY MOUTH ONCE WEEKLY    VITAMIN D (VITAMIN D3) 1000 UNITS TAB    Take 1 tablet by mouth daily    WARFARIN (COUMADIN) 7.5 MG TABLET    Take 2 tablets by mouth on Tuesday, Thursdays, and Saturday; and 1 tablet on all other days of the week or as directed by coumadin clinic.       Review of patient's allergies indicates:  No Known Allergies    Past Surgical History:   Procedure Laterality Date    ASD REPAIR      age 8 [facility]    AV Graft Creation Left 03/2017    CARDIAC CATHETERIZATION      [facility]     CARDIAC VALVE SURGERY  02/08/2017    22 mm Medtronic AV, 28 mm TV Medtronic annuloplaty ring    COLONOSCOPY N/A 8/27/2019    Procedure: COLONOSCOPY;  Surgeon: Addie John MD;  Location: [facility];  Service: Endoscopy;  Laterality: N/A;  dialysis pt *needs K*    COLONOSCOPY N/A 9/3/2020    Procedure: COLONOSCOPY-need INR;  Surgeon: Jemma Youngblood MD;  Location: [facility] ENDO;  Service: Endoscopy;  Laterality: N/A;    ESOPHAGOGASTRODUODENOSCOPY N/A 8/27/2019    Procedure: EGD (ESOPHAGOGASTRODUODENOSCOPY);  Surgeon: Addie John MD;  Location: [facility];  Service: Endoscopy;  Laterality: N/A;    RADIOFREQUENCY ABLATION  03/13/2017    Atrial tachycardia    REMOVAL OF GRAFT Left 7/2/2020    Procedure: REMOVAL, GRAFT;  Surgeon: Sascha Sidhu,  MD;  Location: [facility];  Service: Peripheral Vascular;  Laterality: Left;    RIGHT HEART CATHETERIZATION Right 8/12/2021    Procedure: INSERTION, CATHETER, RIGHT HEART;  Surgeon: Mariah Pierce MD;  Location: [facility] CATH LAB;  Service: Cardiology;  Laterality: Right;       Family History   Problem Relation Age of Onset    Leukemia Mother     Cancer Mother     Heart attack Father         massive MI at age 67    No Known Problems Sister     No Known Problems Brother     No Known Problems Sister     No Known Problems Sister     Heart failure Paternal Grandmother     Diabetes Paternal Aunt     Hypertension Paternal Aunt     Leukemia Paternal Aunt         CLL    Suicide Paternal Uncle     Anesthesia problems Paternal Uncle     Diabetes Paternal Aunt     Stroke Paternal Aunt     Valvular heart disease Maternal Aunt     Kidney disease Neg Hx     Colon cancer Neg Hx        Social History     Socioeconomic History    Marital status: Single   Tobacco Use    Smoking status: Never    Smokeless tobacco: Never   Substance and Sexual Activity    Alcohol use: No     Comment: quit in 2016; does have heavy drinking history; started drinking at age 13; was drinking a 12 pack over the weekend and two 24 ounces of beer a day during the week along with a pint of hard alcohol    Drug use: No    Sexual activity: Not Currently   Social History Narrative    Caregiver Nurse Marjan Swain; 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     Social Determinants of Health     Financial Resource Strain: Low Risk  (12/12/2023)    Overall Financial Resource Strain (CARDIA)     Difficulty of Paying Living Expenses: Not very hard   Food Insecurity: No Food Insecurity (12/12/2023)    Hunger Vital Sign     Worried About Running Out of Food in the Last Year: Never true     Ran Out of Food in the Last Year: Never true   Transportation Needs: No Transportation Needs (12/12/2023)    PRAPARE - Transportation     Lack of Transportation (Medical): No     Lack of  "Transportation (Non-Medical): No   Physical Activity: Inactive (12/12/2023)    Exercise Vital Sign     Days of Exercise per Week: 0 days     Minutes of Exercise per Session: 0 min   Stress: No Stress Concern Present (12/12/2023)    [employer] [place] [employer] - Occupational Stress Questionnaire     Feeling of Stress : Not at all   Social Connections: Moderately Integrated (12/12/2023)    Social Connection and Isolation Panel [NHANES]     Frequency of Communication with Friends and Family: More than three times a week     Frequency of Social Gatherings with Friends and Family: Once a week     Attends Sabianism Services: More than 4 times per year     Active Member of Clubs or Organizations: Yes     Attends Club or Organization Meetings: More than 4 times per year     Marital Status: Never    Housing Stability: Low Risk  (12/12/2023)    Housing Stability Vital Sign     Unable to Pay for Housing in the Last Year: No     Number of Places Lived in the Last Year: 1     Unstable Housing in the Last Year: No       Vitals:    12/14/23 0711   Weight: 100.8 kg (222 lb 3.6 oz)   Height: 6' 2" (1.88 m)   PainSc:   5       Hemoglobin A1C   Date Value Ref Range Status   09/20/2019 4.8 4.0 - 5.6 % Final     Comment:     ADA Screening Guidelines:  5.7-6.4%  Consistent with prediabetes  >or=6.5%  Consistent with diabetes  High levels of fetal hemoglobin interfere with the HbA1C  assay. Heterozygous hemoglobin variants (HbS, HgC, etc)do  not significantly interfere with this assay.   However, presence of multiple variants may affect accuracy.     04/29/2019 4.4 4.0 - 5.6 % Final     Comment:     ADA Screening Guidelines:  5.7-6.4%  Consistent with prediabetes  >or=6.5%  Consistent with diabetes  High levels of fetal hemoglobin interfere with the HbA1C  assay. Heterozygous hemoglobin variants (HbS, HgC, etc)do  not significantly interfere with this assay.   However, presence of multiple variants may affect " accuracy.         Review of Systems   Constitutional:  Negative for chills and fever.   Respiratory:  Negative for shortness of breath.    Cardiovascular:  Negative for chest pain, palpitations, orthopnea, claudication and leg swelling.   Gastrointestinal:  Negative for diarrhea, nausea and vomiting.   Musculoskeletal:  Negative for joint pain.   Skin:  Negative for rash.   Neurological:  Negative for dizziness, tingling, sensory change, focal weakness and weakness.   Psychiatric/Behavioral: Negative.             Objective:      PHYSICAL EXAM: Apperance: Alert and orient in no distress,well developed, and with good attention to grooming and body habits  Patient presents ambulating in tennis shoes.   Lower Extremity Physical Exam:  VASCULAR: Dorsalis pedis pulses 2/4 bilateral and Posterior Tibial pulses 2/4 bilateral.   DERMATOLOGICAL: No skin rashes, subcutaneous nodules, lesions, or ulcers observed bilateral.   NEUROLOGICAL: Light touch, sharp-dull, proprioception all present and equal bilaterally.   MUSCULOSKELETAL:Muscle strength is 5/5 for foot inverters, everters, plantarflexors, and dorsiflexors. Muscle tone is normal.  Ankle joint ROM diminished  with no pain or crepitus bilateral ankle joints. Ankle joints bilateral demonstrate no evidence of subluxation, dislocation or laxity.  STJ bilateral demonstrates diminished ROM with no pain or crepitus. STJ bilateral demonstrates no evidence of subluxation, dislocation or laxity.  MTJ ROM bilateral is diminished, pain free and without crepitus.  MTJ bilateral demonstrates no evidence of subluxation, dislocation or laxity. No pain to palpation bilateral medial foot at arch. Medial aspect of bilateral foot shows tenderness to palpation. No pain on along posterior tibial tendon. The general morphology of the foot is decreased arch height off weight bearing bilateral. + Hubscher maneuver bilateral.          Assessment:       ICD-10-CM ICD-9-CM   1. Bilateral pes planus   M21.41 734    M21.42    2. Chronic foot pain, unspecified laterality  M79.673 729.5    G89.29 338.29       Plan:   Bilateral pes planus    Chronic foot pain, unspecified laterality  -     Ambulatory referral/consult to Podiatry      I counseled the patient on his conditions, regarding findings of my examination, my impressions, and usual treatment plan.   I explained to the patient that etiology and treatment options for arch pain including ice message, new shoegear, orthotic inserts, NSAIDs, rest, strappings and tapings, stretching/strengthening including number and amounts of time each application.    Patient shown proper execution of stretching /strengthening exercise and instructed on correct number and amounts of time each stretch.    The patient and I reviewed the types of shoes he should be wearing, my recommendation includes generally the best time of the day for a shoe fitting is the afternoon, shoes with a wide toe box, very good cushion, and tennis shoes with removable inner soles.  The patient and I reviewed my recommendations for over-the-counter orthotic inserts. Patient instructed to try over-the-counter orthotics before custom orthotics.   Patient to return as needed.            Puneet Nixon DPM  [facility]

## 2023-12-21 ENCOUNTER — OFFICE VISIT (OUTPATIENT)
Dept: UROLOGY | Facility: CLINIC | Age: 53
End: 2023-12-21
Payer: MEDICARE

## 2023-12-21 VITALS
HEIGHT: 74 IN | SYSTOLIC BLOOD PRESSURE: 142 MMHG | BODY MASS INDEX: 28.47 KG/M2 | OXYGEN SATURATION: 97 % | RESPIRATION RATE: 18 BRPM | DIASTOLIC BLOOD PRESSURE: 80 MMHG | HEART RATE: 100 BPM | WEIGHT: 221.81 LBS

## 2023-12-21 DIAGNOSIS — Z01.818 PRE-OP TESTING: ICD-10-CM

## 2023-12-21 DIAGNOSIS — R31.0 GROSS HEMATURIA: Primary | ICD-10-CM

## 2023-12-21 DIAGNOSIS — N52.9 ERECTILE DYSFUNCTION, UNSPECIFIED ERECTILE DYSFUNCTION TYPE: ICD-10-CM

## 2023-12-21 DIAGNOSIS — N28.1 RENAL CYST: ICD-10-CM

## 2023-12-21 PROCEDURE — 99214 OFFICE O/P EST MOD 30 MIN: CPT | Mod: S$PBB,,, | Performed by: UROLOGY

## 2023-12-21 PROCEDURE — 99214 PR OFFICE/OUTPT VISIT, EST, LEVL IV, 30-39 MIN: ICD-10-PCS | Mod: S$PBB,,, | Performed by: UROLOGY

## 2023-12-21 PROCEDURE — 99215 OFFICE O/P EST HI 40 MIN: CPT | Mod: PBBFAC | Performed by: UROLOGY

## 2023-12-21 PROCEDURE — 99999 PR PBB SHADOW E&M-EST. PATIENT-LVL V: CPT | Mod: PBBFAC,,, | Performed by: UROLOGY

## 2023-12-21 PROCEDURE — 99999 PR PBB SHADOW E&M-EST. PATIENT-LVL V: ICD-10-PCS | Mod: PBBFAC,,, | Performed by: UROLOGY

## 2023-12-21 RX ORDER — CEFAZOLIN SODIUM 2 G/50ML
2 SOLUTION INTRAVENOUS
Status: CANCELLED | OUTPATIENT
Start: 2023-12-21

## 2023-12-21 NOTE — PROGRESS NOTES
Chief Complaint:   Encounter Diagnoses   Name Primary?    Gross hematuria Yes    Renal cyst     Erectile dysfunction, unspecified erectile dysfunction type          HPI:   12/21/23- patient returns today to discuss surgery, no gross hematuria or other issues.      2/28/19: 48 yo man voids about once a day since he is a MWF dialysis pt.  Saw a clot and gross hematuria about 2-3 weeks ago on one occasion.  No abd/pelvic pain and no exac/rel factors.  No urolithiasis.  Weak stream.  No  history.  Normal sexual function until recently.  Had a CT Urogram that shows no abnormalities except renal cystic function consistent with ESRD.    Allergies:  Patient has no known allergies.    Medications:  has a current medication list which includes the following prescription(s): amlodipine, ascorbic acid (vitamin c), b complex vitamins, shawna-nataly, calcium acetate(phosphat bind), dialysis solutions, diltiazem, epoetin vikki, ferrous sulfate, fish oil-omega-3 fatty acids, hydralazine, hydrochlorothiazide, hydroxyzine hcl, hydroxyzine hcl, labetalol, multivitamin with minerals, omega-3 fatty acids-vitamin e, pantoprazole, alprostadil, sildenafil, tenofovir disoproxil fumarate, vitamin d, warfarin, and albuterol, and the following Facility-Administered Medications: sodium chloride 0.9% and sodium chloride 0.9%.    Review of Systems:  General: No fever, chills, fatigability, or weight loss.  Skin: No rashes, itching, or changes in color or texture of skin.  Chest: Denies HAY, cyanosis, wheezing, cough, and sputum production.  Abdomen: Appetite fine. No weight loss. Denies diarrhea, abdominal pain, hematemesis, or blood in stool.  Musculoskeletal: No joint stiffness or swelling. Denies back pain.  : As above.  All other review of systems negative.    PMH:   has a past medical history of Anemia, Aortic valve stenosis, Atrial fibrillation, Atrial flutter, Cardiomyopathy, CHF (congestive heart failure), Drug-induced erectile  dysfunction, ESRD due to hypertension, ESRD on dialysis, GERD (gastroesophageal reflux disease), Hepatitis B, Hyperlipidemia, Hypertension, Nightmares, Obesity, DANIEL (obstructive sleep apnea) (11/12/2019), Secondary hyperparathyroidism of renal origin, Supraventricular tachycardia, Tachycardia, and Valvular regurgitation.    PSH:   has a past surgical history that includes ASD repair; Cardiac valuve replacement (02/08/2017); Radiofrequency ablation (03/13/2017); Cardiac catheterization; AV Graft Creation (Left, 03/2017); Colonoscopy (N/A, 8/27/2019); Esophagogastroduodenoscopy (N/A, 8/27/2019); Removal of graft (Left, 7/2/2020); Colonoscopy (N/A, 9/3/2020); and Right heart catheterization (Right, 8/12/2021).    FamHx: family history includes Anesthesia problems in his paternal uncle; Cancer in his mother; Diabetes in his paternal aunt and paternal aunt; Heart attack in his father; Heart failure in his paternal grandmother; Hypertension in his paternal aunt; Leukemia in his mother and paternal aunt; No Known Problems in his brother, sister, sister, and sister; Stroke in his paternal aunt; Suicide in his paternal uncle; Valvular heart disease in his maternal aunt.    SocHx:  reports that he has never smoked. He has never used smokeless tobacco. He reports that he does not drink alcohol and does not use drugs.      Physical Exam:  Vitals:    12/21/23 1020   BP: (!) 142/80   Pulse: 100   Resp: 18       General: A&Ox3, no apparent distress, no deformities  Neck: No masses, normal thyroid  Lungs: normal inspiration, no use of accessory muscles  Heart: normal pulse, no arrhythmias  Abdomen: Soft, NT, ND  Skin: The skin is warm and dry. No jaundice.  Ext: No c/c/e.   : 5/23- Test desc felicita, no abnormalities of epididymus. Normal penile and scrotal skin. Meatus normal.    Labs/Studies:   Cysto negative 7/20  DARLENE bilateral complex renal cysts 2/22  CT urogram hyperdense cysts, no solid lesions 7/21  PSA 0.55  8/23    Impression/Plan:        1. Gross hematuria- no recurrent hematuria, no history of smoking.  Previous structural evaluation was negative by my partner.  Patient does not void.    2. Renal cyst- appeared to be benign, no further workup warranted.    3. Erectile dysfunction- cialis 20 mg, viagra 100 mg and now TriMix are not sufficient.  He still would like to pursue insertion of inflatable penile prosthesis as discussed below.  Per cardiology recommendations though, he will need to be admitted to the hospital on a heparin bridge, have Cardiology involved during his entire hospital stay, also have Cardiac Anesthesiology during the procedure.  Patient is aware of these parameters and the fact that he is high-risk and would to go ahead and proceed with scheduling.  Therefore I will have him seen again by the preop clinic, he will also have appointments with Cardiology, vascular and hepatology prior to admission.  I will bring him in a day before admission to confirm everything is scheduled.  He will proceed with his scheduled dialysis on the next day which will be a Friday, and then be admitted by me for the weekend that I am on call.  Will keep him in the hospital on a bridge, planning on pursuing insertion of inflatable penile prosthesis on that Tuesday.  Patient is aware that he will need to stay in the hospital until he is therapeutic again on Coumadin.  As stated above he will have Cardiology and Medicine on board during the entire stay.  Call with any complaints in the meantime.    Patient understands the risks, benefits and alternatives of the above-stated procedure.  These include but are not limited to damage to the surrounding structures including the testicles, cord structures, urethra, which would require aborting of the procedure and closure with a delayed procedure in the future.  Damage to the bowel, bladder or vascular structures requiring open procedures.  Possibility for delayed catheterization  or stent placement.  Possible risk of difficulty voiding postprocedure, and requiring a Caldwell catheter.  The risk of possible urethral stricture in the future or erosion.  Risk of acute infection, requiring immediate removal.  The risk of this not working as appropriately as he would like, or having cosmetic results that are not pleasing to the patient.  The risk of significant swelling, bruising or discomfort and pain.  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  Risk of heart attack, stroke, death, DVT and PE.  Patient understanding of all the above has elected to proceed with the procedure as stated.

## 2023-12-28 ENCOUNTER — ANTI-COAG VISIT (OUTPATIENT)
Dept: CARDIOLOGY | Facility: CLINIC | Age: 53
End: 2023-12-28
Payer: MEDICARE

## 2023-12-28 DIAGNOSIS — I48.91 ON COUMADIN FOR ATRIAL FIBRILLATION: ICD-10-CM

## 2023-12-28 DIAGNOSIS — Z95.2 HISTORY OF MECHANICAL AORTIC VALVE REPLACEMENT: ICD-10-CM

## 2023-12-28 DIAGNOSIS — Z79.01 ON COUMADIN FOR ATRIAL FIBRILLATION: ICD-10-CM

## 2023-12-28 DIAGNOSIS — Z79.01 LONG TERM (CURRENT) USE OF ANTICOAGULANTS: Primary | ICD-10-CM

## 2023-12-28 LAB — INR PPP: 1.9 (ref 2–3)

## 2023-12-28 PROCEDURE — 93793 ANTICOAG MGMT PT WARFARIN: CPT | Mod: ,,,

## 2023-12-28 PROCEDURE — 99999PBSHW POCT INR: ICD-10-PCS | Mod: PBBFAC,,,

## 2023-12-28 PROCEDURE — 93793 PR ANTICOAGULANT MGMT FOR PT TAKING WARFARIN: ICD-10-PCS | Mod: ,,,

## 2023-12-28 PROCEDURE — 99999PBSHW POCT INR: Mod: PBBFAC,,,

## 2023-12-28 PROCEDURE — 85610 PROTHROMBIN TIME: CPT | Mod: PBBFAC

## 2023-12-28 NOTE — PROGRESS NOTES
Patient's INR is slight;y sub-therapeutic at 1.9.  Patient reports he ate desserts containing nuts on [place].  Advised of increased risk of clotting; signs/symptoms of clotting and need to go to ED if he experiences any.  Patient reports he is not having any signs/symptoms of clotting.  Instructions given: Continue warfarin 7.5 mg Fridays, Mondays and Wednesdays; and 11.25 mg all other days.  Recheck on 1/09/24.  Calendar reviewed with patient.  Patient verbalizes understanding.

## 2024-01-08 ENCOUNTER — OUTPATIENT CASE MANAGEMENT (OUTPATIENT)
Dept: ADMINISTRATIVE | Facility: OTHER | Age: 54
End: 2024-01-08
Payer: MEDICARE

## 2024-01-08 NOTE — PROGRESS NOTES
SW contacted patient regarding referral . SW explained to patient reason for referral to assist with social needs/gym membership. Patient reports if its available he will take the gym membership. SW educated patient on benefits with traditional medicare. SW explained to patient gym membership is not a covered benefit under policy. Patient voiced understanding and denied  any additional social needs.

## 2024-01-09 ENCOUNTER — ANTI-COAG VISIT (OUTPATIENT)
Dept: CARDIOLOGY | Facility: CLINIC | Age: 54
End: 2024-01-09
Payer: MEDICARE

## 2024-01-09 DIAGNOSIS — Z79.01 LONG TERM (CURRENT) USE OF ANTICOAGULANTS: Primary | ICD-10-CM

## 2024-01-09 DIAGNOSIS — Z95.2 HISTORY OF MECHANICAL AORTIC VALVE REPLACEMENT: ICD-10-CM

## 2024-01-09 DIAGNOSIS — Z79.01 ON COUMADIN FOR ATRIAL FIBRILLATION: ICD-10-CM

## 2024-01-09 DIAGNOSIS — I48.91 ON COUMADIN FOR ATRIAL FIBRILLATION: ICD-10-CM

## 2024-01-09 LAB — INR PPP: 1.8 (ref 2–3)

## 2024-01-09 PROCEDURE — 93793 ANTICOAG MGMT PT WARFARIN: CPT | Mod: ,,,

## 2024-01-09 PROCEDURE — 85610 PROTHROMBIN TIME: CPT | Mod: PBBFAC

## 2024-01-09 PROCEDURE — 99999PBSHW POCT INR: Mod: PBBFAC,,,

## 2024-01-09 NOTE — PROGRESS NOTES
Patient's INR is slight;y sub-therapeutic at 1.8.  Patient reports he ate walnuts.   Advised of increased risk of clotting; signs/symptoms of clotting and need to go to ED if he experiences any.  Patient reports he is not having any signs/symptoms of clotting.  Patient reports he is scheduled to have surgery for urology procedure on 2/20/24.   Instructions given: will give boosted dose of  warfarin 15 mg today 1/9/24; then resume warfarin 7.5 mg on Wednesdays, Friday and Mondays; and 11.25 mg all other days.  Recheck on 1/18/24.  Calendar reviewed with patient.  Patient verbalizes understanding.

## 2024-01-16 ENCOUNTER — TELEPHONE (OUTPATIENT)
Dept: GASTROENTEROLOGY | Facility: CLINIC | Age: 54
End: 2024-01-16
Payer: MEDICARE

## 2024-01-16 DIAGNOSIS — B18.1 CHRONIC VIRAL HEPATITIS B WITHOUT DELTA AGENT AND WITHOUT COMA: ICD-10-CM

## 2024-01-16 RX ORDER — TENOFOVIR DISOPROXIL FUMARATE 300 MG/1
300 TABLET, FILM COATED ORAL WEEKLY
Qty: 4 TABLET | Refills: 3 | OUTPATIENT
Start: 2024-01-16 | End: 2024-01-19 | Stop reason: SDUPTHER

## 2024-01-16 NOTE — TELEPHONE ENCOUNTER
----- Message from Akua Johnson MA sent at 1/11/2024  1:46 PM CST -----  Patient has been rescheduled to 4/2/2024 at 3 pm due to the reschedule from 1/30/2024. Please refill the tenofovir to this rescheduled appointment.

## 2024-01-18 ENCOUNTER — ANTI-COAG VISIT (OUTPATIENT)
Dept: CARDIOLOGY | Facility: CLINIC | Age: 54
End: 2024-01-18
Payer: MEDICARE

## 2024-01-18 ENCOUNTER — OFFICE VISIT (OUTPATIENT)
Dept: UROLOGY | Facility: CLINIC | Age: 54
End: 2024-01-18
Payer: MEDICARE

## 2024-01-18 VITALS
WEIGHT: 221 LBS | TEMPERATURE: 98 F | HEART RATE: 81 BPM | SYSTOLIC BLOOD PRESSURE: 129 MMHG | DIASTOLIC BLOOD PRESSURE: 74 MMHG | HEIGHT: 74 IN | BODY MASS INDEX: 28.36 KG/M2

## 2024-01-18 DIAGNOSIS — I48.91 ON COUMADIN FOR ATRIAL FIBRILLATION: ICD-10-CM

## 2024-01-18 DIAGNOSIS — Z95.2 HISTORY OF MECHANICAL AORTIC VALVE REPLACEMENT: ICD-10-CM

## 2024-01-18 DIAGNOSIS — Z79.01 ON COUMADIN FOR ATRIAL FIBRILLATION: ICD-10-CM

## 2024-01-18 DIAGNOSIS — N52.9 ERECTILE DYSFUNCTION, UNSPECIFIED ERECTILE DYSFUNCTION TYPE: ICD-10-CM

## 2024-01-18 DIAGNOSIS — N28.1 RENAL CYST: ICD-10-CM

## 2024-01-18 DIAGNOSIS — R31.0 GROSS HEMATURIA: Primary | ICD-10-CM

## 2024-01-18 DIAGNOSIS — Z79.01 LONG TERM (CURRENT) USE OF ANTICOAGULANTS: Primary | ICD-10-CM

## 2024-01-18 LAB — INR PPP: 2 (ref 2–3)

## 2024-01-18 PROCEDURE — 93793 ANTICOAG MGMT PT WARFARIN: CPT | Mod: ,,,

## 2024-01-18 PROCEDURE — 85610 PROTHROMBIN TIME: CPT | Mod: PBBFAC

## 2024-01-18 PROCEDURE — 99999PBSHW POCT INR: Mod: PBBFAC,,,

## 2024-01-18 PROCEDURE — 99213 OFFICE O/P EST LOW 20 MIN: CPT | Mod: S$PBB,,, | Performed by: UROLOGY

## 2024-01-18 PROCEDURE — 99214 OFFICE O/P EST MOD 30 MIN: CPT | Mod: PBBFAC | Performed by: UROLOGY

## 2024-01-18 PROCEDURE — 99999 PR PBB SHADOW E&M-EST. PATIENT-LVL IV: CPT | Mod: PBBFAC,,, | Performed by: UROLOGY

## 2024-01-18 NOTE — PROGRESS NOTES
Patient's INR is therapeutic at 2.0.  Patient reports he followed previous instructions.  Patient is scheduled for urology procedure on 2/20/24. Patient reports he was advised by Dr. Natarajan per Dr. Gudino, he will have to be admitted into the hospital on a heparin bridge prior to procedure.  Instructions given: Continue warfarin 7.5 mg on Fridays, Mondays and Wednesdays; and 11.25 mg all other days.  Recheck on 2/8/24.  Calendar reviewed with patient.  Patient verbalizes understanding.

## 2024-01-18 NOTE — PROGRESS NOTES
Chief Complaint:   Encounter Diagnoses   Name Primary?    Gross hematuria Yes    Renal cyst     Erectile dysfunction, unspecified erectile dysfunction type          HPI:   1/18/24- patient returns today to clarify plans, no changes.      2/28/19: 50 yo man voids about once a day since he is a MWF dialysis pt.  Saw a clot and gross hematuria about 2-3 weeks ago on one occasion.  No abd/pelvic pain and no exac/rel factors.  No urolithiasis.  Weak stream.  No  history.  Normal sexual function until recently.  Had a CT Urogram that shows no abnormalities except renal cystic function consistent with ESRD.    Allergies:  Patient has no known allergies.    Medications:  has a current medication list which includes the following prescription(s): amlodipine, ascorbic acid (vitamin c), b complex vitamins, shawna-nataly, calcium acetate(phosphat bind), spikevax 5818-4434(12y up)(pf), dialysis solutions, diltiazem, epoetin vikki, ferrous sulfate, fish oil-omega-3 fatty acids, hydralazine, hydrochlorothiazide, hydroxyzine hcl, hydroxyzine hcl, labetalol, multivitamin with minerals, omega-3 fatty acids-vitamin e, pantoprazole, alprostadil, sildenafil, tenofovir disoproxil fumarate, vitamin d, warfarin, and albuterol, and the following Facility-Administered Medications: sodium chloride 0.9% and sodium chloride 0.9%.    Review of Systems:  General: No fever, chills, fatigability, or weight loss.  Skin: No rashes, itching, or changes in color or texture of skin.  Chest: Denies HAY, cyanosis, wheezing, cough, and sputum production.  Abdomen: Appetite fine. No weight loss. Denies diarrhea, abdominal pain, hematemesis, or blood in stool.  Musculoskeletal: No joint stiffness or swelling. Denies back pain.  : As above.  All other review of systems negative.    PMH:   has a past medical history of Anemia, Aortic valve stenosis, Atrial fibrillation, Atrial flutter, Cardiomyopathy, CHF (congestive heart failure), Drug-induced erectile  dysfunction, ESRD due to hypertension, ESRD on dialysis, GERD (gastroesophageal reflux disease), Hepatitis B, Hyperlipidemia, Hypertension, Nightmares, Obesity, DANIEL (obstructive sleep apnea) (11/12/2019), Secondary hyperparathyroidism of renal origin, Supraventricular tachycardia, Tachycardia, and Valvular regurgitation.    PSH:   has a past surgical history that includes ASD repair; Cardiac valuve replacement (02/08/2017); Radiofrequency ablation (03/13/2017); Cardiac catheterization; AV Graft Creation (Left, 03/2017); Colonoscopy (N/A, 8/27/2019); Esophagogastroduodenoscopy (N/A, 8/27/2019); Removal of graft (Left, 7/2/2020); Colonoscopy (N/A, 9/3/2020); and Right heart catheterization (Right, 8/12/2021).    FamHx: family history includes Anesthesia problems in his paternal uncle; Cancer in his mother; Diabetes in his paternal aunt and paternal aunt; Heart attack in his father; Heart failure in his paternal grandmother; Hypertension in his paternal aunt; Leukemia in his mother and paternal aunt; No Known Problems in his brother, sister, sister, and sister; Stroke in his paternal aunt; Suicide in his paternal uncle; Valvular heart disease in his maternal aunt.    SocHx:  reports that he has never smoked. He has never used smokeless tobacco. He reports that he does not drink alcohol and does not use drugs.      Physical Exam:  There were no vitals filed for this visit.      General: A&Ox3, no apparent distress, no deformities  Neck: No masses, normal thyroid  Lungs: normal inspiration, no use of accessory muscles  Heart: normal pulse, no arrhythmias  Abdomen: Soft, NT, ND  Skin: The skin is warm and dry. No jaundice.  Ext: No c/c/e.   : 5/23- Test desc felicita, no abnormalities of epididymus. Normal penile and scrotal skin. Meatus normal.    Labs/Studies:   Cysto negative 7/20  DARLENE bilateral complex renal cysts 2/22  CT urogram hyperdense cysts, no solid lesions 7/21  PSA 0.55 8/23    Impression/Plan:        1. Gross  hematuria- no recurrent hematuria, no history of smoking.  Previous structural evaluation was negative by my partner.  Patient does not void.    2. Renal cyst- appeared to be benign, no further workup warranted.    3. Erectile dysfunction- cialis 20 mg, viagra 100 mg and now TriMix are not sufficient.  He still would like to pursue insertion of inflatable penile prosthesis as discussed previously.  Per cardiology recommendations though, he will need to be admitted to the hospital on a heparin bridge, have Cardiology involved during his entire hospital stay, also have Cardiac Anesthesiology during the procedure.  Patient is aware of these parameters and the fact that he is high-risk and would like to go ahead and proceed with scheduling.  Therefore I will have him seen again by the preop clinic, he will also have appointments with Cardiology, vascular and hepatology prior to admission.  He will proceed with his scheduled dialysis on the Friday before surgery, and then be admitted by me for the weekend that I am on call.  Will keep him in the hospital on a heparin bridge, planning on pursuing insertion of inflatable penile prosthesis on that following Tuesday.  Patient is aware that he will need to stay in the hospital until he is therapeutic again on Coumadin.  As stated above he will have Cardiology and Medicine on board during the entire stay.  Call with any complaints in the meantime.    Patient understands the risks, benefits and alternatives of the above-stated procedure.  These include but are not limited to damage to the surrounding structures including the testicles, cord structures, urethra, which would require aborting of the procedure and closure with a delayed procedure in the future.  Damage to the bowel, bladder or vascular structures requiring open procedures.  Possibility for delayed catheterization or stent placement.  Possible risk of difficulty voiding postprocedure, and requiring a Caldwell catheter.   The risk of possible urethral stricture in the future or erosion.  Risk of acute infection, requiring immediate removal.  The risk of this not working as appropriately as he would like, or having cosmetic results that are not pleasing to the patient.  The risk of significant swelling, bruising or discomfort and pain.  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  Risk of heart attack, stroke, death, DVT and PE.  Patient understanding of all the above has elected to proceed with the procedure as stated.

## 2024-01-19 ENCOUNTER — TELEPHONE (OUTPATIENT)
Dept: GASTROENTEROLOGY | Facility: CLINIC | Age: 54
End: 2024-01-19
Payer: MEDICARE

## 2024-01-19 DIAGNOSIS — B18.1 CHRONIC VIRAL HEPATITIS B WITHOUT DELTA AGENT AND WITHOUT COMA: ICD-10-CM

## 2024-01-19 RX ORDER — TENOFOVIR DISOPROXIL FUMARATE 300 MG/1
300 TABLET, FILM COATED ORAL WEEKLY
Qty: 4 TABLET | Refills: 0 | Status: SHIPPED | OUTPATIENT
Start: 2024-01-19 | End: 2024-01-23

## 2024-01-19 NOTE — TELEPHONE ENCOUNTER
----- Message from Akua Johnson MA sent at 1/19/2024  2:40 PM CST -----  Regarding: Please assist  Contact: patient [phone]  Patient states his pharmacy is wanting him to reach out to Sandy [facility] for refills. Message has been sent to Sandy, but she is out of the clinic and unable to respond. Patient needs tenofovir sent in to the [facility]. Please assist. Patient states he has been waiting a while and does not know what the issue is with getting this medication.   ----- Message -----  From: Leilani Malin  Sent: 1/19/2024  12:41 PM CST  To: Domingo MANUEL Staff    Patient called requesting a urgent call back from Sandy Lane or her nurse, patient stated the pharmacy is telling him the need to speak with Sandy Lane, before filling his rx

## 2024-01-19 NOTE — TELEPHONE ENCOUNTER
Message has been sent to VERENA Smalls to assist with medication refill.       ----- Message from Leilani Malin sent at 1/19/2024 12:39 PM CST -----  Contact: patient [phone]  Patient called requesting a urgent call back from Sandy Lane or her nurse, patient stated the pharmacy is telling him the need to speak with Sandy Lane, before filling his rx

## 2024-01-22 ENCOUNTER — TELEPHONE (OUTPATIENT)
Dept: HEPATOLOGY | Facility: CLINIC | Age: 54
End: 2024-01-22
Payer: MEDICARE

## 2024-01-22 NOTE — TELEPHONE ENCOUNTER
"Akua attempted to contact the patient to let him know that a message was sent to the provider to cancel the rx that went to the [facility] pharmacy so that [facility] can fill it but she received a "voicemail full" message and was unable to make the patient aware.  "

## 2024-01-23 ENCOUNTER — TELEPHONE (OUTPATIENT)
Dept: HEPATOLOGY | Facility: CLINIC | Age: 54
End: 2024-01-23
Payer: MEDICARE

## 2024-01-23 DIAGNOSIS — B18.1 CHRONIC VIRAL HEPATITIS B WITHOUT DELTA AGENT AND WITHOUT COMA: ICD-10-CM

## 2024-01-23 RX ORDER — TENOFOVIR DISOPROXIL FUMARATE 300 MG/1
300 TABLET, FILM COATED ORAL WEEKLY
Qty: 4 TABLET | Refills: 2 | Status: ACTIVE | OUTPATIENT
Start: 2024-01-23

## 2024-01-23 RX ORDER — TENOFOVIR DISOPROXIL FUMARATE 300 MG/1
300 TABLET, FILM COATED ORAL WEEKLY
Qty: 4 TABLET | Refills: 1 | Status: CANCELLED | OUTPATIENT
Start: 2024-01-23 | End: 2024-04-07

## 2024-01-23 NOTE — TELEPHONE ENCOUNTER
----- Message from Nae Serrano sent at 1/23/2024  3:16 PM CST -----  Contact: billy  Type:  Patient Call          Who Called: Patient         Does the patient know what this is regarding?: Requesting a call back about her Rx tenofovir disoproxil fumarate (VIREAD) 300 mg Tab ; pt said that he was told by the pharmacy that the script haven't been sent ; Pt is upset he need his medication ;  please advise           Would the patient rather a call back or a response via [facility]?call           Best Call Back Number: [phone]             Additional Information:   [facility] [address] [place] [facility] [address]  Phone: [phone] Fax: [phone]

## 2024-01-23 NOTE — TELEPHONE ENCOUNTER
----- Message from Lico Sorto MA sent at 1/22/2024  3:55 PM CST -----  Carly sent rx for patient's Viread [facility]. [facility] wants [facility] and they are refusing to fill it because it went to the specialty. Can you cancel the one that Carly sent?

## 2024-01-25 ENCOUNTER — HOSPITAL ENCOUNTER (OUTPATIENT)
Dept: CARDIOLOGY | Facility: HOSPITAL | Age: 54
Discharge: HOME | End: 2024-01-25
Attending: INTERNAL MEDICINE
Payer: MEDICARE

## 2024-01-25 ENCOUNTER — OFFICE VISIT (OUTPATIENT)
Dept: CARDIOLOGY | Facility: CLINIC | Age: 54
End: 2024-01-25
Payer: MEDICARE

## 2024-01-25 VITALS
DIASTOLIC BLOOD PRESSURE: 70 MMHG | SYSTOLIC BLOOD PRESSURE: 118 MMHG | BODY MASS INDEX: 28.58 KG/M2 | HEIGHT: 74 IN | OXYGEN SATURATION: 95 % | WEIGHT: 222.69 LBS | HEART RATE: 91 BPM

## 2024-01-25 DIAGNOSIS — I47.29 NSVT (NONSUSTAINED VENTRICULAR TACHYCARDIA): ICD-10-CM

## 2024-01-25 DIAGNOSIS — I10 ESSENTIAL HYPERTENSION: ICD-10-CM

## 2024-01-25 DIAGNOSIS — Z95.2 HISTORY OF MECHANICAL AORTIC VALVE REPLACEMENT: ICD-10-CM

## 2024-01-25 DIAGNOSIS — Z79.01 LONG TERM (CURRENT) USE OF ANTICOAGULANTS: ICD-10-CM

## 2024-01-25 DIAGNOSIS — Z95.2 H/O MECHANICAL AORTIC VALVE REPLACEMENT: ICD-10-CM

## 2024-01-25 DIAGNOSIS — I47.10 SVT (SUPRAVENTRICULAR TACHYCARDIA): ICD-10-CM

## 2024-01-25 DIAGNOSIS — I27.20 PULMONARY HTN: ICD-10-CM

## 2024-01-25 DIAGNOSIS — Z95.2 H/O AORTIC VALVE REPLACEMENT: ICD-10-CM

## 2024-01-25 DIAGNOSIS — Z79.01 ON COUMADIN FOR ATRIAL FIBRILLATION: ICD-10-CM

## 2024-01-25 DIAGNOSIS — N18.6 ESRD (END STAGE RENAL DISEASE) ON DIALYSIS: ICD-10-CM

## 2024-01-25 DIAGNOSIS — Z98.890 HISTORY OF RADIOFREQUENCY ABLATION FOR COMPLEX RIGHT ATRIAL ARRHYTHMIA: ICD-10-CM

## 2024-01-25 DIAGNOSIS — I48.0 PAF (PAROXYSMAL ATRIAL FIBRILLATION): ICD-10-CM

## 2024-01-25 DIAGNOSIS — I47.19 ATRIAL TACHYCARDIA: ICD-10-CM

## 2024-01-25 DIAGNOSIS — I49.3 PVC'S (PREMATURE VENTRICULAR CONTRACTIONS): ICD-10-CM

## 2024-01-25 DIAGNOSIS — N18.6 END-STAGE RENAL DISEASE: ICD-10-CM

## 2024-01-25 DIAGNOSIS — I48.91 ON COUMADIN FOR ATRIAL FIBRILLATION: ICD-10-CM

## 2024-01-25 DIAGNOSIS — Z99.2 ESRD (END STAGE RENAL DISEASE) ON DIALYSIS: ICD-10-CM

## 2024-01-25 DIAGNOSIS — Z79.01 ANTICOAGULATED: ICD-10-CM

## 2024-01-25 DIAGNOSIS — I27.20 PULMONARY HYPERTENSION: ICD-10-CM

## 2024-01-25 DIAGNOSIS — I50.32 CHRONIC DIASTOLIC HEART FAILURE: Primary | ICD-10-CM

## 2024-01-25 DIAGNOSIS — B18.1 CHRONIC VIRAL HEPATITIS B WITHOUT DELTA AGENT AND WITHOUT COMA: ICD-10-CM

## 2024-01-25 DIAGNOSIS — Z95.2 S/P AVR (AORTIC VALVE REPLACEMENT): ICD-10-CM

## 2024-01-25 DIAGNOSIS — I70.0 AORTIC ATHEROSCLEROSIS: ICD-10-CM

## 2024-01-25 DIAGNOSIS — Z01.810 PREOP CARDIOVASCULAR EXAM: ICD-10-CM

## 2024-01-25 PROCEDURE — 99214 OFFICE O/P EST MOD 30 MIN: CPT | Mod: PBBFAC | Performed by: INTERNAL MEDICINE

## 2024-01-25 PROCEDURE — 99999 PR PBB SHADOW E&M-EST. PATIENT-LVL IV: CPT | Mod: PBBFAC,,, | Performed by: INTERNAL MEDICINE

## 2024-01-25 PROCEDURE — 93010 ELECTROCARDIOGRAM REPORT: CPT | Mod: ,,, | Performed by: INTERNAL MEDICINE

## 2024-01-25 PROCEDURE — 99214 OFFICE O/P EST MOD 30 MIN: CPT | Mod: S$PBB,,, | Performed by: INTERNAL MEDICINE

## 2024-01-25 PROCEDURE — 93005 ELECTROCARDIOGRAM TRACING: CPT

## 2024-01-25 RX ORDER — AMLODIPINE BESYLATE 10 MG/1
TABLET ORAL
Qty: 90 TABLET | Refills: 3 | Status: ON HOLD | OUTPATIENT
Start: 2024-01-25 | End: 2024-05-26 | Stop reason: HOSPADM

## 2024-01-25 RX ORDER — HYDRALAZINE HYDROCHLORIDE 100 MG/1
TABLET, FILM COATED ORAL
Qty: 270 TABLET | Refills: 3 | Status: ON HOLD | OUTPATIENT
Start: 2024-01-25 | End: 2024-05-26 | Stop reason: HOSPADM

## 2024-01-25 RX ORDER — LISINOPRIL 40 MG/1
40 TABLET ORAL DAILY
Qty: 90 TABLET | Refills: 3 | Status: ON HOLD | OUTPATIENT
Start: 2024-01-25 | End: 2024-05-26 | Stop reason: HOSPADM

## 2024-01-25 RX ORDER — LISINOPRIL 40 MG/1
40 TABLET ORAL
COMMUNITY
End: 2024-01-25 | Stop reason: SDUPTHER

## 2024-01-25 RX ORDER — LABETALOL 200 MG/1
200 TABLET, FILM COATED ORAL 3 TIMES DAILY
Qty: 90 TABLET | Refills: 6 | Status: ON HOLD | OUTPATIENT
Start: 2024-01-25 | End: 2024-05-26 | Stop reason: HOSPADM

## 2024-01-25 RX ORDER — DILTIAZEM HYDROCHLORIDE 420 MG/1
420 CAPSULE, EXTENDED RELEASE ORAL DAILY
Qty: 90 CAPSULE | Refills: 1 | Status: SHIPPED | OUTPATIENT
Start: 2024-01-25 | End: 2025-01-24

## 2024-01-25 NOTE — PROGRESS NOTES
Subjective:   Patient ID:  Isidro White Jr. is a 54 y.o. male who presents for cardiac consult of No chief complaint on file.          The patient came in today for cardiac consult of No chief complaint on file.      Isidro White Jr. is a 54 y.o. male pt with PAF, ASD closure at age 8 ([facility]), HFpEF, s/p AVR with a 22 mm mechanical valve and TV annuloplasty with a 28 mm ring 3/17, HTN, PHTN, Mitral stenosis, SVT, EP performed RFA (3/13/17) and has been on HD - MWF since Feb 8,2016 here for CV follow up.     12/13/18  Holter from 9/4/18 revealed freq PVCs, dilt increased to 240 mg. No palpitations. Has been doing well lately, BP is well controlled. Labwork from HD has been normal. Still makes some urine, says he has some hematuria. Will need annual stress and 2D echo. He is also undergoing workup for HepB will see Dr. Youngblood.     9/26/23  ECHO 4/2023 with normal bi V function, grade 1 DD, mech AV with mean 19mmHg, mild to mod MS - 11 mmHg. Mild to mod TR, PASP 47 mmHg.     He has upcoming penile prosthesis implant with Dr. Shirley.  He is here for preop eval.   BP and HR well controlled. Pt will need to be bridged inpatient with heparin drip while INR drifts down for acceptable level for surgery and remain on heparin while coumadin restarted.   Cardiology should be consulted preop while inpt.   ECG - Afib, PVCs, LAD, nonsc IVCD, LVH    1/25/24  BP and HR stable. BMI 28 - 222 lbs.     Pt saw Dr. De León - recurrence of his old arrhythmia or could be a new source (AFL, R atriotomy, perimitral).  - discussed ablation.     Pt will have  penile prosthesis implant with Dr. Shirley , will be admitted Fri before surgery to [facility] and cardiology consulted  No CP/SOP.     ECG - NSR, LAD, nonsp IVCD, LVH     Patient has dec exercise tolerance.     Patient is compliant with medications.    Results for orders placed during the hospital encounter of 04/27/23    Echo    Interpretation Summary  · The left  ventricle is normal in size with concentric remodeling and low normal systolic function.  · Mild left atrial enlargement.  · Grade I left ventricular diastolic dysfunction.  · The estimated PA systolic pressure is 47 mmHg.  · Normal right ventricular size with normal right ventricular systolic function.  · There is pulmonary hypertension.  · Normal central venous pressure (3 mmHg).  · There is a bileaflet tilting disc mechanical aortic valve present. Prosthetic aortic valve is normal.  · The aortic valve mean gradient is 19 mmHg with a dimensionless index of 0.47.  · The estimated ejection fraction is 50%.  · The mean diastolic gradient across the mitral valve is 11 mmHg at a heart rate of 76 bpm.  · There is mild to moderate mitral stenosis.  · Mild to moderate tricuspid regurgitation.      Results for orders placed during the hospital encounter of 07/07/22    Echo    Interpretation Summary  · The left ventricle is normal in size with moderate concentric hypertrophy and normal systolic function.  · Mild left atrial enlargement.  · The estimated ejection fraction is 60%.  · Grade II left ventricular diastolic dysfunction.  · There is abnormal septal wall motion consistent with post-operative status.  · Normal right ventricular size with normal right ventricular systolic function.  · Mild right atrial enlargement.  · There is a bileaflet tilting disc mechanical aortic valve present.  · The aortic valve mean gradient is 15 mmHg with a dimensionless index of 0.52.  · The mean diastolic gradient across the mitral valve is 11 mmHg at a heart rate of bpm.  · There is mild to moderate mitral stenosis.  · There is moderate pulmonary hypertension.  · Mild tricuspid regurgitation.  · Intermediate central venous pressure (8 mmHg).  · The estimated PA systolic pressure is 54 mmHg.  · The aortic root is mildly dilated.      RHC  Summary       The estimated blood loss was <50 mL.  Believe PA pressures are elevated in setting of  increased filling pressures. Mean PA pressure is right around 34.  Recommend stricter volume management, review of echoes for the last 3 year demonstrates similar findings.  If this is a barrier to renal transplant, can consider adding PDE5, however do not think he will be able to tolerate given increased filling pressure and patient stating he drops pressures with HD.  Will likely need to discuss with abdominal transplant team.     The procedure log was documented by Documenter: Isis Sevilla and verified by Mariah Pierce MD.      Results for orders placed during the hospital encounter of 05/25/21    Nuclear Stress - Cardiology Interpreted    Interpretation Summary    Normal myocardial perfusion scan. There is no evidence of myocardial ischemia or infarction.    The gated perfusion images showed an ejection fraction of 65% at rest. The gated perfusion images showed an ejection fraction of 47% post stress.    The EKG portion of this study is negative for ischemia.    · Intermediate central venous pressure (8 mmHg).       Cath 2/17  1. Coronary angiography.      a.     Left main widely patent.      b.     LAD widely patent.      c.     Ramus widely patent and massive in size.      d.     Circumflex widely patent with a small OM 1 and OM 2 branch.      e.     Right coronary widely patent.  2. Hemodynamics:  Right heart catheterization.      a.     Pulmonary capillary wedge pressure 33 at end-expiration.      b.     Pulmonary artery pressure 78/42.      c.     Right ventricular pressure 70/21.      d.     Right atrial pressure 22 with a peak V-wave of 24.      e.     Cardiac output by thermodilution is between 6 and 7       L/minute, by Patricia 6.1 L/minute.    CONCLUSION  1. Normal coronary arteries.  2. Pulmonary hypertension.  3. Severe aortic insufficiency.      Past Medical History:   Diagnosis Date    Anemia     Aortic valve stenosis     s/p mechanical AVR    Atrial fibrillation     Atrial flutter      Cardiomyopathy     CHF (congestive heart failure)     Drug-induced erectile dysfunction     ESRD due to hypertension     HD M, W, F    ESRD on dialysis     GERD (gastroesophageal reflux disease)     Hepatitis B     Hyperlipidemia     Hypertension     Nightmares     Obesity     DANIEL (obstructive sleep apnea) 11/12/2019    Secondary hyperparathyroidism of renal origin     Supraventricular tachycardia     atrial tachycardia    Tachycardia     Valvular regurgitation     AI, TR       Past Surgical History:   Procedure Laterality Date    ASD REPAIR      age 8 [facility]    AV Graft Creation Left 03/2017    CARDIAC CATHETERIZATION      [facility]     CARDIAC VALVE SURGERY  02/08/2017    22 mm Medtronic AV, 28 mm TV Medtronic annuloplaty ring    COLONOSCOPY N/A 8/27/2019    Procedure: COLONOSCOPY;  Surgeon: Addie John MD;  Location: [facility] ENDO;  Service: Endoscopy;  Laterality: N/A;  dialysis pt *needs K*    COLONOSCOPY N/A 9/3/2020    Procedure: COLONOSCOPY-need INR;  Surgeon: Jemma Youngblood MD;  Location: [facility] ENDO;  Service: Endoscopy;  Laterality: N/A;    ESOPHAGOGASTRODUODENOSCOPY N/A 8/27/2019    Procedure: EGD (ESOPHAGOGASTRODUODENOSCOPY);  Surgeon: Addie John MD;  Location: [facility];  Service: Endoscopy;  Laterality: N/A;    RADIOFREQUENCY ABLATION  03/13/2017    Atrial tachycardia    REMOVAL OF GRAFT Left 7/2/2020    Procedure: REMOVAL, GRAFT;  Surgeon: Sascha Sidhu MD;  Location: [facility] OR;  Service: Peripheral Vascular;  Laterality: Left;    RIGHT HEART CATHETERIZATION Right 8/12/2021    Procedure: INSERTION, CATHETER, RIGHT HEART;  Surgeon: Mariah Pierce MD;  Location: [facility] CATH LAB;  Service: Cardiology;  Laterality: Right;       Social History     Tobacco Use    Smoking status: Never    Smokeless tobacco: Never   Substance Use Topics    Alcohol use: No     Comment: quit in 2016; does have heavy drinking history; started drinking at age 13; was drinking a 12 pack over the weekend and two 24  "ounces of beer a day during the week along with a pint of hard alcohol    Drug use: No       Family History   Problem Relation Age of Onset    Leukemia Mother     Cancer Mother     Heart attack Father         massive MI at age 67    No Known Problems Sister     No Known Problems Brother     No Known Problems Sister     No Known Problems Sister     Heart failure Paternal Grandmother     Diabetes Paternal Aunt     Hypertension Paternal Aunt     Leukemia Paternal Aunt         CLL    Suicide Paternal Uncle     Anesthesia problems Paternal Uncle     Diabetes Paternal Aunt     Stroke Paternal Aunt     Valvular heart disease Maternal Aunt     Kidney disease Neg Hx     Colon cancer Neg Hx            Review of Systems   Constitutional:  Positive for malaise/fatigue.   HENT: Negative.     Eyes: Negative.    Respiratory:  Negative for shortness of breath.    Cardiovascular:  Negative for chest pain and palpitations.   Gastrointestinal: Negative.    Genitourinary:  Negative for hematuria.   Musculoskeletal:  Positive for joint pain.   Skin: Negative.    Neurological: Negative.    Endo/Heme/Allergies: Negative.    Psychiatric/Behavioral: Negative.     All 12 systems otherwise negative.      Wt Readings from Last 3 Encounters:   01/25/24 101 kg (222 lb 10.6 oz)   01/18/24 100.2 kg (221 lb)   12/21/23 100.6 kg (221 lb 12.5 oz)     Temp Readings from Last 3 Encounters:   01/18/24 97.7 °F (36.5 °C) (Oral)   11/02/23 97 °F (36.1 °C) (Tympanic)   10/10/23 96.8 °F (36 °C) (Tympanic)     BP Readings from Last 3 Encounters:   01/25/24 118/70   01/18/24 129/74   12/21/23 (!) 142/80     Pulse Readings from Last 3 Encounters:   01/25/24 91   01/18/24 81   12/21/23 100       /70 (BP Location: Right arm, Patient Position: Sitting, BP Method: Large (Manual))   Pulse 91   Ht 6' 2" (1.88 m)   Wt 101 kg (222 lb 10.6 oz)   SpO2 95%   BMI 28.59 kg/m²      Objective:   Physical Exam  Vitals and nursing note reviewed.   Constitutional:     "   General: He is not in acute distress.     Appearance: He is well-developed. He is not diaphoretic.   HENT:      Head: Normocephalic and atraumatic.      Nose: Nose normal.   Eyes:      General: No scleral icterus.     Conjunctiva/sclera: Conjunctivae normal.   Neck:      Thyroid: No thyromegaly.      Vascular: No JVD.   Cardiovascular:      Rate and Rhythm: Normal rate. Rhythm irregular.      Heart sounds: S1 normal and S2 normal. Murmur heard.      Midsystolic murmur is present at the upper right sternal border.      No friction rub. No gallop. No S3 or S4 sounds.      Comments: Crisp S2  Pulmonary:      Effort: Pulmonary effort is normal. No respiratory distress.      Breath sounds: Normal breath sounds. No stridor. No wheezing or rales.   Chest:      Chest wall: No tenderness.   Abdominal:      General: Bowel sounds are normal. There is no distension.      Palpations: Abdomen is soft. There is no mass.      Tenderness: There is no abdominal tenderness. There is no rebound.   Genitourinary:     Comments: Deferred  Musculoskeletal:         General: No tenderness or deformity. Normal range of motion.      Cervical back: Normal range of motion and neck supple.   Lymphadenopathy:      Cervical: No cervical adenopathy.   Skin:     General: Skin is warm and dry.      Coloration: Skin is not pale.      Findings: No erythema or rash.   Neurological:      Mental Status: He is alert and oriented to person, place, and time.      Motor: No abnormal muscle tone.      Coordination: Coordination normal.   Psychiatric:         Behavior: Behavior normal.         Thought Content: Thought content normal.         Judgment: Judgment normal.         Lab Results   Component Value Date     10/10/2023    K 4.2 10/10/2023    CL 96 10/10/2023    CO2 26 10/10/2023    BUN 48 (H) 10/10/2023    CREATININE 13.6 (H) 10/10/2023    GLU 97 10/10/2023    HGBA1C 4.8 09/20/2019    MG 2.3 08/12/2021    AST 10 10/10/2023    ALT 14 10/10/2023     ALBUMIN 3.3 (L) 10/10/2023    PROT 8.1 10/10/2023    BILITOT 0.5 10/10/2023    WBC 5.57 10/10/2023    HGB 11.9 (L) 10/10/2023    HCT 36.3 (L) 10/10/2023    HCT 25 (L) 02/08/2017     (H) 10/10/2023     (L) 10/10/2023    INR 2.0 01/18/2024    INR 1.5 (H) 08/22/2023    CHOL 248 (H) 11/05/2020    HDL 42 11/05/2020    LDLCALC 164.8 (H) 11/05/2020    TRIG 206 (H) 11/05/2020     (H) 07/01/2021     Assessment:      1. Chronic diastolic heart failure    2. NSVT (nonsustained ventricular tachycardia)    3. Aortic atherosclerosis    4. On Coumadin for atrial fibrillation    5. History of mechanical aortic valve replacement    6. Long term (current) use of anticoagulants    7. Anticoagulated    8. SVT (supraventricular tachycardia)    9. Pulmonary hypertension    10. Atrial tachycardia    11. S/P AVR (aortic valve replacement)    12. PVC's (premature ventricular contractions)    13. PAF (paroxysmal atrial fibrillation)    14. ESRD (end stage renal disease) on dialysis    15. End-stage renal disease    16. Chronic viral hepatitis B without delta agent and without coma    17. H/O mechanical aortic valve replacement    18. History of radiofrequency ablation for complex right atrial arrhythmia    19. H/O aortic valve replacement    20. Pulmonary HTN    21. Essential hypertension              Plan:   1. Chronic Diastolic HF  - cont low salt diet  - cont HD for volume removal    2. S/p mech AVR, with mild to moderate MS, mild to mod TR  - cont coumadin  - f/u with coumadin clinic here  - f/u with [facility] - in [place]  - ECHO in July 2022 with normal bi V function, stable valves AV and MV, mild to mod MS, PASP 54 mmHg  -ECHO 4/2023 with normal bi V function, grade 1 DD, mech AV with mean 19mmHg, mild to mod MS - 11 mmHg. Mild to mod TR, PASP 47 mmHg.     3. ESRD - dry weight improved  - cont HD  - anemia with PRBCs and EPO    4. HTN   - cont meds  - adjusted BP meds - labetelol TID    5. PVCs, SVT s/p RFA ,  Afib - in NSR now   - cont BB and CCB   - f/u with Dr. De León  - recurrence of his old arrhythmia or could be a new source (AFL, R atriotomy, perimitral).  - discussed ablation.     6. HLD  - cont statin    7. ED  - cont tx PRN  - f/u urology   - was on Cialis, start Viagra f/u with urology   - monitor BP, avoid nitrates     8. Pulm HTN/ DANIEL  - PASP 56 mmHg. - repeat ECHO ordered   -f/u CHF/pulm HTN clinic - may start PDE5  - f/u with Dr. Pierce    9. Chronic hep B   - cont tx per hepatology  - further workup pending for cirrhosis - EGD    10.  Pre-OP CV evaluation prior to renal transplant  - Elevated CV risk for renal transplant but discuss with pul HTN clinic  - may have further workup at [facility]    11. Preop CV eval -  penile prosthesis implant with Dr. Shirley   - elevated CV risk due to significant valve disease, Afib, PVCs  - cont BB periop    - need to be bridged inpatient with heparin drip while INR drifts down for acceptable level for surgery and remain on heparin while coumadin restarted.   - Cardiology should be consulted preop while inpt, recommend cardiac anesthesiologist     Thank you for allowing me to participate in this patient's care. Please do not hesitate to contact me with any questions or concerns. Consult note has been forwarded to the referral physician.     Fuad Gudino MD, [facility]  Cardiovascular Disease  [facility], [place]  [phone] (P)

## 2024-01-25 NOTE — Clinical Note
Preop CV eval -  penile prosthesis implant with Dr. Shirley  - elevated CV risk due to significant valve disease, Afib, PVCs - cont BB periop   - need to be bridged inpatient with heparin drip while INR drifts down for acceptable level for surgery and remain on heparin while coumadin restarted.  - Cardiology should be consulted preop while inpt, recommend cardiac anesthesiologist   He is stable cardiac wise, let me know if you need anything else preop. Thanks,  - PM

## 2024-01-30 ENCOUNTER — TELEPHONE (OUTPATIENT)
Dept: UROLOGY | Facility: CLINIC | Age: 54
End: 2024-01-30
Payer: MEDICARE

## 2024-01-30 NOTE — TELEPHONE ENCOUNTER
----- Message from Simin Lorenz RN sent at 1/30/2024  8:39 AM CST -----  Regarding: CARDIOLOGY ORDERS PRIOR TO SX  Good morning, please be advised of Dr. Gudino's, 1/25, cardiology orders prior to surgery:    11. Preop CV eval -  penile prosthesis implant with Dr. Shirley   - elevated CV risk due to significant valve disease, Afib, PVCs  - cont BB periop    - need to be bridged inpatient with heparin drip while INR drifts down for acceptable level for surgery and remain on heparin while coumadin restarted.   - Cardiology should be consulted preop while inpt, recommend cardiac anesthesiologist         Thank you

## 2024-02-08 ENCOUNTER — ANTI-COAG VISIT (OUTPATIENT)
Dept: CARDIOLOGY | Facility: CLINIC | Age: 54
End: 2024-02-08
Payer: MEDICARE

## 2024-02-08 DIAGNOSIS — Z79.01 LONG TERM (CURRENT) USE OF ANTICOAGULANTS: Primary | ICD-10-CM

## 2024-02-08 DIAGNOSIS — I48.91 ON COUMADIN FOR ATRIAL FIBRILLATION: ICD-10-CM

## 2024-02-08 DIAGNOSIS — Z95.2 HISTORY OF MECHANICAL AORTIC VALVE REPLACEMENT: ICD-10-CM

## 2024-02-08 DIAGNOSIS — Z79.01 ON COUMADIN FOR ATRIAL FIBRILLATION: ICD-10-CM

## 2024-02-08 LAB — INR PPP: 2 (ref 2–3)

## 2024-02-08 PROCEDURE — 93793 ANTICOAG MGMT PT WARFARIN: CPT | Mod: ,,,

## 2024-02-08 PROCEDURE — 99999PBSHW POCT INR: Mod: PBBFAC,,,

## 2024-02-08 PROCEDURE — 85610 PROTHROMBIN TIME: CPT | Mod: PBBFAC

## 2024-02-08 NOTE — PROGRESS NOTES
Patient's INR is therapeutic at 2.0.   Patient is scheduled for urology procedure on 2/20/24. Patient reports he was advised by Dr. Natarajan per Dr. Gudino, he will have to be admitted into the hospital on 2/16/24 on a heparin bridge prior to procedure. Sent to PharmD for dosing/instructions.

## 2024-02-08 NOTE — PROGRESS NOTES
INR at goal-2/0. Medications and chart reviewed. No changes noted to necessitate adjustment of warfarin or follow-up plan. See calendar.  Will ask him to take a lower dose of 7.5 mg on Thursday 2/15 then he will be holding for procedure after admission for Heparin dttp and surgery on 2/20/24.  We will contact post discharge.

## 2024-02-16 ENCOUNTER — HOSPITAL ENCOUNTER (INPATIENT)
Facility: HOSPITAL | Age: 54
LOS: 6 days | Discharge: HOME | DRG: 729 | End: 2024-02-22
Attending: UROLOGY | Admitting: UROLOGY
Payer: MEDICARE

## 2024-02-16 DIAGNOSIS — I50.32 CHRONIC DIASTOLIC HEART FAILURE: Primary | ICD-10-CM

## 2024-02-16 DIAGNOSIS — Z99.2 ESRD (END STAGE RENAL DISEASE) ON DIALYSIS: Chronic | ICD-10-CM

## 2024-02-16 DIAGNOSIS — Z95.2 HISTORY OF MECHANICAL AORTIC VALVE REPLACEMENT: ICD-10-CM

## 2024-02-16 DIAGNOSIS — N52.9 ERECTILE DYSFUNCTION: ICD-10-CM

## 2024-02-16 DIAGNOSIS — Z01.818 PREOPERATIVE CLEARANCE: ICD-10-CM

## 2024-02-16 DIAGNOSIS — D69.6 THROMBOCYTOPENIA: ICD-10-CM

## 2024-02-16 DIAGNOSIS — R07.9 CHEST PAIN: ICD-10-CM

## 2024-02-16 DIAGNOSIS — N18.6 ESRD (END STAGE RENAL DISEASE) ON DIALYSIS: Chronic | ICD-10-CM

## 2024-02-16 DIAGNOSIS — R31.0 GROSS HEMATURIA: ICD-10-CM

## 2024-02-16 PROBLEM — K21.9 GERD (GASTROESOPHAGEAL REFLUX DISEASE): Status: ACTIVE | Noted: 2024-02-16

## 2024-02-16 LAB
ANION GAP SERPL CALC-SCNC: 19 MMOL/L (ref 8–16)
APTT PPP: 39.1 SEC (ref 21–32)
BASOPHILS # BLD AUTO: 0.02 K/UL (ref 0–0.2)
BASOPHILS NFR BLD: 0.3 % (ref 0–1.9)
BUN SERPL-MCNC: 42 MG/DL (ref 6–20)
CALCIUM SERPL-MCNC: 9.5 MG/DL (ref 8.7–10.5)
CHLORIDE SERPL-SCNC: 97 MMOL/L (ref 95–110)
CO2 SERPL-SCNC: 26 MMOL/L (ref 23–29)
CREAT SERPL-MCNC: 11.4 MG/DL (ref 0.5–1.4)
DIFFERENTIAL METHOD BLD: ABNORMAL
EOSINOPHIL # BLD AUTO: 0.3 K/UL (ref 0–0.5)
EOSINOPHIL NFR BLD: 4.5 % (ref 0–8)
ERYTHROCYTE [DISTWIDTH] IN BLOOD BY AUTOMATED COUNT: 15.9 % (ref 11.5–14.5)
EST. GFR  (NO RACE VARIABLE): 5 ML/MIN/1.73 M^2
GLUCOSE SERPL-MCNC: 94 MG/DL (ref 70–110)
HCT VFR BLD AUTO: 35.6 % (ref 40–54)
HGB BLD-MCNC: 12 G/DL (ref 14–18)
IMM GRANULOCYTES # BLD AUTO: 0.01 K/UL (ref 0–0.04)
IMM GRANULOCYTES NFR BLD AUTO: 0.2 % (ref 0–0.5)
INR PPP: 1.7 (ref 0.8–1.2)
LYMPHOCYTES # BLD AUTO: 1.5 K/UL (ref 1–4.8)
LYMPHOCYTES NFR BLD: 22.9 % (ref 18–48)
MCH RBC QN AUTO: 34 PG (ref 27–31)
MCHC RBC AUTO-ENTMCNC: 33.7 G/DL (ref 32–36)
MCV RBC AUTO: 101 FL (ref 82–98)
MONOCYTES # BLD AUTO: 0.7 K/UL (ref 0.3–1)
MONOCYTES NFR BLD: 11.4 % (ref 4–15)
NEUTROPHILS # BLD AUTO: 3.9 K/UL (ref 1.8–7.7)
NEUTROPHILS NFR BLD: 60.7 % (ref 38–73)
NRBC BLD-RTO: 0 /100 WBC
PLATELET # BLD AUTO: 163 K/UL (ref 150–450)
PMV BLD AUTO: 11.2 FL (ref 9.2–12.9)
POTASSIUM SERPL-SCNC: 4 MMOL/L (ref 3.5–5.1)
PROTHROMBIN TIME: 19.2 SEC (ref 9–12.5)
RBC # BLD AUTO: 3.53 M/UL (ref 4.6–6.2)
SODIUM SERPL-SCNC: 142 MMOL/L (ref 136–145)
WBC # BLD AUTO: 6.38 K/UL (ref 3.9–12.7)

## 2024-02-16 PROCEDURE — 85610 PROTHROMBIN TIME: CPT | Performed by: UROLOGY

## 2024-02-16 PROCEDURE — 80048 BASIC METABOLIC PNL TOTAL CA: CPT | Performed by: UROLOGY

## 2024-02-16 PROCEDURE — 85025 COMPLETE CBC W/AUTO DIFF WBC: CPT | Performed by: UROLOGY

## 2024-02-16 PROCEDURE — 36415 COLL VENOUS BLD VENIPUNCTURE: CPT | Performed by: UROLOGY

## 2024-02-16 PROCEDURE — 85730 THROMBOPLASTIN TIME PARTIAL: CPT | Performed by: UROLOGY

## 2024-02-16 PROCEDURE — 21400001 HC TELEMETRY ROOM

## 2024-02-16 RX ORDER — DILTIAZEM HYDROCHLORIDE 420 MG/1
420 CAPSULE, EXTENDED RELEASE ORAL DAILY
Status: DISCONTINUED | OUTPATIENT
Start: 2024-02-17 | End: 2024-02-16 | Stop reason: SDUPTHER

## 2024-02-16 RX ORDER — PANTOPRAZOLE SODIUM 40 MG/1
40 TABLET, DELAYED RELEASE ORAL DAILY
Status: DISCONTINUED | OUTPATIENT
Start: 2024-02-17 | End: 2024-02-22 | Stop reason: HOSPADM

## 2024-02-16 RX ORDER — SUCRALFATE 1 G/10ML
1 SUSPENSION ORAL EVERY 6 HOURS
Status: DISCONTINUED | OUTPATIENT
Start: 2024-02-17 | End: 2024-02-22 | Stop reason: HOSPADM

## 2024-02-16 RX ORDER — IBUPROFEN 200 MG
24 TABLET ORAL
Status: DISCONTINUED | OUTPATIENT
Start: 2024-02-16 | End: 2024-02-22 | Stop reason: HOSPADM

## 2024-02-16 RX ORDER — HYDRALAZINE HYDROCHLORIDE 50 MG/1
100 TABLET, FILM COATED ORAL 3 TIMES DAILY
Status: DISCONTINUED | OUTPATIENT
Start: 2024-02-16 | End: 2024-02-22 | Stop reason: HOSPADM

## 2024-02-16 RX ORDER — ONDANSETRON HYDROCHLORIDE 2 MG/ML
4 INJECTION, SOLUTION INTRAVENOUS EVERY 8 HOURS PRN
Status: DISCONTINUED | OUTPATIENT
Start: 2024-02-16 | End: 2024-02-22 | Stop reason: HOSPADM

## 2024-02-16 RX ORDER — NALOXONE HCL 0.4 MG/ML
0.02 VIAL (ML) INJECTION
Status: DISCONTINUED | OUTPATIENT
Start: 2024-02-16 | End: 2024-02-22 | Stop reason: HOSPADM

## 2024-02-16 RX ORDER — TALC
6 POWDER (GRAM) TOPICAL NIGHTLY PRN
Status: DISCONTINUED | OUTPATIENT
Start: 2024-02-16 | End: 2024-02-22 | Stop reason: HOSPADM

## 2024-02-16 RX ORDER — LISINOPRIL 20 MG/1
40 TABLET ORAL DAILY
Status: DISCONTINUED | OUTPATIENT
Start: 2024-02-17 | End: 2024-02-22 | Stop reason: HOSPADM

## 2024-02-16 RX ORDER — GLUCAGON 1 MG
1 KIT INJECTION
Status: DISCONTINUED | OUTPATIENT
Start: 2024-02-16 | End: 2024-02-22 | Stop reason: HOSPADM

## 2024-02-16 RX ORDER — HYDROXYZINE HYDROCHLORIDE 25 MG/1
25 TABLET, FILM COATED ORAL DAILY PRN
Status: DISCONTINUED | OUTPATIENT
Start: 2024-02-16 | End: 2024-02-20

## 2024-02-16 RX ORDER — MULTIVITAMIN
1 TABLET ORAL DAILY
Status: DISCONTINUED | OUTPATIENT
Start: 2024-02-17 | End: 2024-02-17

## 2024-02-16 RX ORDER — ACETAMINOPHEN 650 MG/1
650 SUPPOSITORY RECTAL EVERY 4 HOURS PRN
Status: DISCONTINUED | OUTPATIENT
Start: 2024-02-16 | End: 2024-02-19

## 2024-02-16 RX ORDER — CALCIUM ACETATE 667 MG/1
2001 CAPSULE ORAL
Status: DISCONTINUED | OUTPATIENT
Start: 2024-02-17 | End: 2024-02-22 | Stop reason: HOSPADM

## 2024-02-16 RX ORDER — ALUMINUM HYDROXIDE, MAGNESIUM HYDROXIDE, AND SIMETHICONE 1200; 120; 1200 MG/30ML; MG/30ML; MG/30ML
30 SUSPENSION ORAL
Status: DISCONTINUED | OUTPATIENT
Start: 2024-02-16 | End: 2024-02-22 | Stop reason: HOSPADM

## 2024-02-16 RX ORDER — POLYETHYLENE GLYCOL 3350 17 G/17G
17 POWDER, FOR SOLUTION ORAL DAILY PRN
Status: DISCONTINUED | OUTPATIENT
Start: 2024-02-16 | End: 2024-02-22 | Stop reason: HOSPADM

## 2024-02-16 RX ORDER — PROMETHAZINE HYDROCHLORIDE 25 MG/1
25 TABLET ORAL EVERY 6 HOURS PRN
Status: DISCONTINUED | OUTPATIENT
Start: 2024-02-16 | End: 2024-02-22 | Stop reason: HOSPADM

## 2024-02-16 RX ORDER — IBUPROFEN 200 MG
16 TABLET ORAL
Status: DISCONTINUED | OUTPATIENT
Start: 2024-02-16 | End: 2024-02-22 | Stop reason: HOSPADM

## 2024-02-16 RX ORDER — SIMETHICONE 80 MG
1 TABLET,CHEWABLE ORAL 4 TIMES DAILY PRN
Status: DISCONTINUED | OUTPATIENT
Start: 2024-02-16 | End: 2024-02-22 | Stop reason: HOSPADM

## 2024-02-16 RX ORDER — TENOFOVIR DISOPROXIL FUMARATE 300 MG/1
300 TABLET, FILM COATED ORAL WEEKLY
Status: DISCONTINUED | OUTPATIENT
Start: 2024-02-19 | End: 2024-02-22 | Stop reason: HOSPADM

## 2024-02-16 RX ORDER — HEPARIN SODIUM,PORCINE/D5W 25000/250
0-40 INTRAVENOUS SOLUTION INTRAVENOUS CONTINUOUS
Status: DISCONTINUED | OUTPATIENT
Start: 2024-02-16 | End: 2024-02-19

## 2024-02-16 RX ORDER — ALUMINUM HYDROXIDE, MAGNESIUM HYDROXIDE, AND SIMETHICONE 1200; 120; 1200 MG/30ML; MG/30ML; MG/30ML
30 SUSPENSION ORAL 4 TIMES DAILY PRN
Status: DISCONTINUED | OUTPATIENT
Start: 2024-02-16 | End: 2024-02-16

## 2024-02-16 RX ORDER — SODIUM CHLORIDE 0.9 % (FLUSH) 0.9 %
3 SYRINGE (ML) INJECTION EVERY 12 HOURS PRN
Status: DISCONTINUED | OUTPATIENT
Start: 2024-02-16 | End: 2024-02-16

## 2024-02-16 RX ORDER — AMLODIPINE BESYLATE 10 MG/1
10 TABLET ORAL DAILY
Status: DISCONTINUED | OUTPATIENT
Start: 2024-02-17 | End: 2024-02-22 | Stop reason: HOSPADM

## 2024-02-16 RX ORDER — SODIUM CHLORIDE 0.9 % (FLUSH) 0.9 %
10 SYRINGE (ML) INJECTION
Status: DISCONTINUED | OUTPATIENT
Start: 2024-02-16 | End: 2024-02-22 | Stop reason: HOSPADM

## 2024-02-16 RX ORDER — ACETAMINOPHEN 325 MG/1
650 TABLET ORAL EVERY 6 HOURS PRN
Status: DISCONTINUED | OUTPATIENT
Start: 2024-02-16 | End: 2024-02-19

## 2024-02-16 RX ORDER — LABETALOL 200 MG/1
200 TABLET, FILM COATED ORAL 3 TIMES DAILY
Status: DISCONTINUED | OUTPATIENT
Start: 2024-02-16 | End: 2024-02-22 | Stop reason: HOSPADM

## 2024-02-17 LAB
APTT PPP: 39.6 SEC (ref 21–32)
APTT PPP: 49.4 SEC (ref 21–32)
APTT PPP: 61.5 SEC (ref 21–32)
APTT PPP: 61.5 SEC (ref 21–32)
BASOPHILS # BLD AUTO: 0.02 K/UL (ref 0–0.2)
BASOPHILS NFR BLD: 0.3 % (ref 0–1.9)
DIFFERENTIAL METHOD BLD: ABNORMAL
EOSINOPHIL # BLD AUTO: 0.3 K/UL (ref 0–0.5)
EOSINOPHIL NFR BLD: 5.3 % (ref 0–8)
ERYTHROCYTE [DISTWIDTH] IN BLOOD BY AUTOMATED COUNT: 15.8 % (ref 11.5–14.5)
HCT VFR BLD AUTO: 34.3 % (ref 40–54)
HGB BLD-MCNC: 11.3 G/DL (ref 14–18)
IMM GRANULOCYTES # BLD AUTO: 0.01 K/UL (ref 0–0.04)
IMM GRANULOCYTES NFR BLD AUTO: 0.2 % (ref 0–0.5)
LYMPHOCYTES # BLD AUTO: 1.4 K/UL (ref 1–4.8)
LYMPHOCYTES NFR BLD: 22.3 % (ref 18–48)
MCH RBC QN AUTO: 33.4 PG (ref 27–31)
MCHC RBC AUTO-ENTMCNC: 32.9 G/DL (ref 32–36)
MCV RBC AUTO: 102 FL (ref 82–98)
MONOCYTES # BLD AUTO: 0.9 K/UL (ref 0.3–1)
MONOCYTES NFR BLD: 15.2 % (ref 4–15)
NEUTROPHILS # BLD AUTO: 3.5 K/UL (ref 1.8–7.7)
NEUTROPHILS NFR BLD: 56.7 % (ref 38–73)
NRBC BLD-RTO: 0 /100 WBC
PLATELET # BLD AUTO: 159 K/UL (ref 150–450)
PMV BLD AUTO: 10.5 FL (ref 9.2–12.9)
RBC # BLD AUTO: 3.38 M/UL (ref 4.6–6.2)
WBC # BLD AUTO: 6.18 K/UL (ref 3.9–12.7)

## 2024-02-17 PROCEDURE — 36415 COLL VENOUS BLD VENIPUNCTURE: CPT | Mod: XB | Performed by: NURSE PRACTITIONER

## 2024-02-17 PROCEDURE — 25000003 PHARM REV CODE 250: Performed by: UROLOGY

## 2024-02-17 PROCEDURE — 99223 1ST HOSP IP/OBS HIGH 75: CPT | Mod: ,,, | Performed by: INTERNAL MEDICINE

## 2024-02-17 PROCEDURE — 85730 THROMBOPLASTIN TIME PARTIAL: CPT | Mod: 91 | Performed by: NURSE PRACTITIONER

## 2024-02-17 PROCEDURE — 63600175 PHARM REV CODE 636 W HCPCS: Performed by: NURSE PRACTITIONER

## 2024-02-17 PROCEDURE — 21400001 HC TELEMETRY ROOM

## 2024-02-17 PROCEDURE — 99222 1ST HOSP IP/OBS MODERATE 55: CPT | Mod: ,,, | Performed by: INTERNAL MEDICINE

## 2024-02-17 PROCEDURE — 36415 COLL VENOUS BLD VENIPUNCTURE: CPT | Mod: XB | Performed by: UROLOGY

## 2024-02-17 PROCEDURE — 85730 THROMBOPLASTIN TIME PARTIAL: CPT | Performed by: UROLOGY

## 2024-02-17 PROCEDURE — 85025 COMPLETE CBC W/AUTO DIFF WBC: CPT | Performed by: NURSE PRACTITIONER

## 2024-02-17 RX ORDER — CINACALCET 30 MG/1
30 TABLET, FILM COATED ORAL
Status: DISCONTINUED | OUTPATIENT
Start: 2024-02-19 | End: 2024-02-22 | Stop reason: HOSPADM

## 2024-02-17 RX ORDER — CALCITRIOL 0.25 UG/1
0.5 CAPSULE ORAL
Status: DISCONTINUED | OUTPATIENT
Start: 2024-02-19 | End: 2024-02-22 | Stop reason: HOSPADM

## 2024-02-17 RX ADMIN — CALCIUM ACETATE 2001 MG: 667 CAPSULE ORAL at 09:02

## 2024-02-17 RX ADMIN — THERA TABS 1 TABLET: TAB at 09:02

## 2024-02-17 RX ADMIN — HEPARIN SODIUM 12 UNITS/KG/HR: 10000 INJECTION, SOLUTION INTRAVENOUS at 12:02

## 2024-02-17 RX ADMIN — LABETALOL HYDROCHLORIDE 200 MG: 200 TABLET, FILM COATED ORAL at 03:02

## 2024-02-17 RX ADMIN — LABETALOL HYDROCHLORIDE 200 MG: 200 TABLET, FILM COATED ORAL at 09:02

## 2024-02-17 RX ADMIN — CALCIUM ACETATE 2001 MG: 667 CAPSULE ORAL at 11:02

## 2024-02-17 RX ADMIN — PANTOPRAZOLE SODIUM 40 MG: 40 TABLET, DELAYED RELEASE ORAL at 09:02

## 2024-02-17 RX ADMIN — SUCRALFATE ORAL 1 G: 1 SUSPENSION ORAL at 11:02

## 2024-02-17 RX ADMIN — DILTIAZEM HYDROCHLORIDE 420 MG: 240 CAPSULE, COATED, EXTENDED RELEASE ORAL at 09:02

## 2024-02-17 RX ADMIN — NEPHROCAP 1 CAPSULE: 1 CAP ORAL at 09:02

## 2024-02-17 RX ADMIN — ALUMINUM HYDROXIDE, MAGNESIUM HYDROXIDE, AND DIMETHICONE 30 ML: 200; 20; 200 SUSPENSION ORAL at 11:02

## 2024-02-17 RX ADMIN — CALCIUM ACETATE 2001 MG: 667 CAPSULE ORAL at 04:02

## 2024-02-17 RX ADMIN — HEPARIN SODIUM 12 UNITS/KG/HR: 10000 INJECTION, SOLUTION INTRAVENOUS at 04:02

## 2024-02-17 NOTE — ASSESSMENT & PLAN NOTE
Plan:  -continue anticoagulation therpay with heparin while in hospital  -tele monitoring  -cards consulted for surgical clearance

## 2024-02-17 NOTE — SUBJECTIVE & OBJECTIVE
Past Medical History:   Diagnosis Date    Anemia     Aortic valve stenosis     s/p mechanical AVR    Atrial fibrillation     Atrial flutter     Cardiomyopathy     CHF (congestive heart failure)     Drug-induced erectile dysfunction     ESRD due to hypertension     HD M, W, F    ESRD on dialysis     GERD (gastroesophageal reflux disease)     Hepatitis B     Hyperlipidemia     Hypertension     Nightmares     Obesity     DANIEL (obstructive sleep apnea) 11/12/2019    Secondary hyperparathyroidism of renal origin     Supraventricular tachycardia     atrial tachycardia    Tachycardia     Valvular regurgitation     AI, TR       Past Surgical History:   Procedure Laterality Date    ASD REPAIR      age 8 [facility]    AV Graft Creation Left 03/2017    CARDIAC CATHETERIZATION      [facility]     CARDIAC VALVE SURGERY  02/08/2017    22 mm Medtronic AV, 28 mm TV Medtronic annuloplaty ring    COLONOSCOPY N/A 8/27/2019    Procedure: COLONOSCOPY;  Surgeon: Addie John MD;  Location: [facility] ENDO;  Service: Endoscopy;  Laterality: N/A;  dialysis pt *needs K*    COLONOSCOPY N/A 9/3/2020    Procedure: COLONOSCOPY-need INR;  Surgeon: Jemma Youngblood MD;  Location: [facility] ENDO;  Service: Endoscopy;  Laterality: N/A;    ESOPHAGOGASTRODUODENOSCOPY N/A 8/27/2019    Procedure: EGD (ESOPHAGOGASTRODUODENOSCOPY);  Surgeon: Addie John MD;  Location: [facility] ENDO;  Service: Endoscopy;  Laterality: N/A;    RADIOFREQUENCY ABLATION  03/13/2017    Atrial tachycardia    REMOVAL OF GRAFT Left 7/2/2020    Procedure: REMOVAL, GRAFT;  Surgeon: Sascha Sidhu MD;  Location: [facility] OR;  Service: Peripheral Vascular;  Laterality: Left;    RIGHT HEART CATHETERIZATION Right 8/12/2021    Procedure: INSERTION, CATHETER, RIGHT HEART;  Surgeon: Mariah Pierce MD;  Location: [facility] CATH LAB;  Service: Cardiology;  Laterality: Right;       Review of patient's allergies indicates:  No Known Allergies    Current Facility-Administered Medications on File Prior to  Encounter   Medication    0.9%  NaCl infusion    sodium chloride 0.9% flush 10 mL     Current Outpatient Medications on File Prior to Encounter   Medication Sig    albuterol (VENTOLIN HFA) 90 mcg/actuation inhaler Inhale 2 puffs into the lungs every 6 (six) hours as needed for Wheezing or Shortness of Breath. Rescue (Patient not taking: Reported on 1/25/2024)    ascorbic acid, vitamin C, (VITAMIN C) 500 MG tablet Take 1 tablet (500 mg total) by mouth 2 (two) times daily.    b complex vitamins tablet Take 1 tablet by mouth once daily.    B complex-vitamin C-folic acid (ETHAN-RACQUEL) 0.8 mg Tab Take 1 tablet by mouth once daily    calcium acetate,phosphat bind, (PHOSLO) 667 mg tablet Take 3 capsules by mouth three times daily with meals AND take 1 capsule by mouth twice daily with snacks    dialysis solutions (PERITONEAL DIALYSIS SOLUTION IP) Inject into the peritoneum every Mon, Wed, Fri. Lt FA Fistula, [facility] on M, W, F.    epoetin vikki (PROCRIT) 10,000 unit/mL injection Inject 1 mL (10,000 Units total) into the skin every Mon, Wed, Fri. To be given with HD    ferrous sulfate 324 mg (65 mg iron) TbEC Take 325 mg by mouth once daily.    fish oil-omega-3 fatty acids 300-1,000 mg capsule Take 2 g by mouth once daily.    hydrOXYzine HCL (ATARAX) 25 MG tablet Take 1 tablet by mouth daily as needed for itching    hydrOXYzine HCL (ATARAX) 25 MG tablet take one tablet by mouth daily as needed for itching    multivitamin with minerals tablet Take 1 tablet by mouth once daily.    omega-3 fatty acids-vitamin E (FISH OIL) 1,000 mg Cap Take 1 capsule by mouth once daily.    pantoprazole (PROTONIX) 40 MG tablet Take 1 tablet by mouth once daily    pep injection Inject 0.3 ml as directed     For compounding pharmacy use:   Add PAPAVERINE 30 mcg  Add PHENTOLAMINE 10 mg  Add ALPROSTADIL 100 mcg    sildenafiL (VIAGRA) 100 MG tablet Take 1 tablet (100 mg total) by mouth daily as needed for Erectile Dysfunction.     vitamin D (VITAMIN D3) 1000 units Tab Take 1 tablet by mouth daily    warfarin (COUMADIN) 7.5 MG tablet Take 2 tablets by mouth on Tuesday, Thursdays, and Saturday; and 1 tablet on all other days of the week or as directed by coumadin clinic. (Patient taking differently: Take 1 tablet (7.5 mg) by mouth on Mondays, Wednesdays and Fridays; and 1.5 tablets (11.25) on all other days of the week or as directed by coumadin clinic.)     Family History       Problem Relation (Age of Onset)    Anesthesia problems Paternal Uncle    Cancer Mother    Diabetes Paternal Aunt, Paternal Aunt    Heart attack Father    Heart failure Paternal Grandmother    Hypertension Paternal Aunt    Leukemia Mother, Paternal Aunt    No Known Problems Sister, Brother, Sister, Sister    Stroke Paternal Aunt    Suicide Paternal Uncle    Valvular heart disease Maternal Aunt          Tobacco Use    Smoking status: Never    Smokeless tobacco: Never   Substance and Sexual Activity    Alcohol use: No     Comment: quit in 2016; does have heavy drinking history; started drinking at age 13; was drinking a 12 pack over the weekend and two 24 ounces of beer a day during the week along with a pint of hard alcohol    Drug use: No    Sexual activity: Not Currently     Review of Systems   Constitutional:  Negative for chills, diaphoresis, fatigue and fever.   Respiratory:  Negative for cough and shortness of breath.    Cardiovascular:  Negative for chest pain, palpitations and leg swelling.   All other systems reviewed and are negative.    Objective:     Vital Signs (Most Recent):  Temp: 98.1 °F (36.7 °C) (02/16/24 2139)  Pulse: 78 (02/16/24 2144)  Resp: 18 (02/16/24 2139)  BP: (!) 106/59 (02/16/24 2139)  SpO2: (!) 94 % (02/16/24 2139) Vital Signs (24h Range):  Temp:  [98.1 °F (36.7 °C)] 98.1 °F (36.7 °C)  Pulse:  [76-78] 78  Resp:  [18] 18  SpO2:  [94 %] 94 %  BP: (106)/(59) 106/59     Weight: 96.6 kg (212 lb 15.4 oz)  Body mass index is 27.34 kg/m².     Physical  Exam  Vitals and nursing note reviewed.   Constitutional:       General: He is awake. He is not in acute distress.     Appearance: Normal appearance. He is well-developed and well-groomed. He is not ill-appearing, toxic-appearing or diaphoretic.   HENT:      Head: Normocephalic and atraumatic.   Eyes:      Extraocular Movements: Extraocular movements intact.      Conjunctiva/sclera: Conjunctivae normal.   Cardiovascular:      Rate and Rhythm: Normal rate and regular rhythm.      Pulses: Normal pulses.      Heart sounds: Murmur heard.      Arteriovenous access: Left arteriovenous access is present.     Comments: +bruit and thrill noted to LLE.  Pulmonary:      Effort: Pulmonary effort is normal.      Breath sounds: Normal breath sounds. No decreased breath sounds, wheezing, rhonchi or rales.   Abdominal:      General: Bowel sounds are normal.      Palpations: Abdomen is soft.      Tenderness: There is no abdominal tenderness.   Musculoskeletal:      Cervical back: Normal range of motion and neck supple.      Right lower leg: No edema.      Left lower leg: No edema.      Comments: 5/5 strength throughout   Skin:     General: Skin is warm and dry.      Capillary Refill: Capillary refill takes less than 2 seconds.   Neurological:      General: No focal deficit present.      Mental Status: He is alert and oriented to person, place, and time. Mental status is at baseline.      GCS: GCS eye subscore is 4. GCS verbal subscore is 5. GCS motor subscore is 6.      Cranial Nerves: Cranial nerves 2-12 are intact.      Motor: Motor function is intact.   Psychiatric:         Mood and Affect: Mood normal.         Behavior: Behavior normal. Behavior is cooperative.        LABS:  Recent Results (from the past 24 hour(s))   Basic metabolic panel    Collection Time: 02/16/24  9:23 PM   Result Value Ref Range    Sodium 142 136 - 145 mmol/L    Potassium 4.0 3.5 - 5.1 mmol/L    Chloride 97 95 - 110 mmol/L    CO2 26 23 - 29 mmol/L     Glucose 94 70 - 110 mg/dL    BUN 42 (H) 6 - 20 mg/dL    Creatinine 11.4 (H) 0.5 - 1.4 mg/dL    Calcium 9.5 8.7 - 10.5 mg/dL    Anion Gap 19 (H) 8 - 16 mmol/L    eGFR 5 (A) >60 mL/min/1.73 m^2   CBC auto differential    Collection Time: 02/16/24  9:23 PM   Result Value Ref Range    WBC 6.38 3.90 - 12.70 K/uL    RBC 3.53 (L) 4.60 - 6.20 M/uL    Hemoglobin 12.0 (L) 14.0 - 18.0 g/dL    Hematocrit 35.6 (L) 40.0 - 54.0 %     (H) 82 - 98 fL    MCH 34.0 (H) 27.0 - 31.0 pg    MCHC 33.7 32.0 - 36.0 g/dL    RDW 15.9 (H) 11.5 - 14.5 %    Platelets 163 150 - 450 K/uL    MPV 11.2 9.2 - 12.9 fL    Immature Granulocytes 0.2 0.0 - 0.5 %    Gran # (ANC) 3.9 1.8 - 7.7 K/uL    Immature Grans (Abs) 0.01 0.00 - 0.04 K/uL    Lymph # 1.5 1.0 - 4.8 K/uL    Mono # 0.7 0.3 - 1.0 K/uL    Eos # 0.3 0.0 - 0.5 K/uL    Baso # 0.02 0.00 - 0.20 K/uL    nRBC 0 0 /100 WBC    Gran % 60.7 38.0 - 73.0 %    Lymph % 22.9 18.0 - 48.0 %    Mono % 11.4 4.0 - 15.0 %    Eosinophil % 4.5 0.0 - 8.0 %    Basophil % 0.3 0.0 - 1.9 %    Differential Method Automated    APTT    Collection Time: 02/16/24  9:23 PM   Result Value Ref Range    aPTT 39.1 (H) 21.0 - 32.0 sec   Protime-INR    Collection Time: 02/16/24  9:23 PM   Result Value Ref Range    Prothrombin Time 19.2 (H) 9.0 - 12.5 sec    INR 1.7 (H) 0.8 - 1.2       RADIOLOGY  No results found.    EKG    MICROBIOLOGY    MDM     Amount and/or Complexity of Data Reviewed  Clinical lab tests: reviewed  Tests in the radiology section of CPT®: reviewed  Tests in the medicine section of CPT®: reviewed  Discussion of test results with the performing providers: yes  Decide to obtain previous medical records or to obtain history from someone other than the patient: yes  Obtain history from someone other than the patient: yes  Review and summarize past medical records: yes  Discuss the patient with other providers: yes  Independent visualization of images, tracings, or specimens: yes

## 2024-02-17 NOTE — PLAN OF CARE
[facility] - Telemetry (Hospital)  Initial Discharge Assessment       Primary Care Provider: Boston Nevarez MD    Admission Diagnosis: Erectile dysfunction, unspecified erectile dysfunction type [N52.9]  Erectile dysfunction [N52.9]    Admission Date: 2/16/2024  Expected Discharge Date:     Transition of Care Barriers: (P) None    Payor: MEDICARE / Plan: MEDICARE PART A & B / Product Type: Government /     Extended Emergency Contact Information  Primary Emergency Contact: Maggie Hernandez  Mobile Phone: [phone]  Relation: Sister  Preferred language: English   needed? No  Secondary Emergency Contact: Ana Mckeon  Mobile Phone: [phone]  Relation: Friend  Preferred language: English   needed? No    Discharge Plan A: (P) Home         [facility] [address] [place] [address]  Phone: [phone] Fax: [phone]    [id number]  [place] - [address] AT [facility]  [address]  [place] [address]  Phone: [phone] Fax: [phone]      Initial Assessment (most recent)       Adult Discharge Assessment - 02/17/24 1142          Discharge Assessment    Assessment Type Discharge Planning Assessment     Confirmed/corrected address, phone number and insurance Yes     Confirmed Demographics Correct on Facesheet     Source of Information patient     When was your last doctors appointment? 02/06/24     Communicated SARBJIT with patient/caregiver Date not available/Unable to determine     Reason For Admission Surgery     People in Home alone     Do you expect to return to your current living situation? Yes     Do you have help at home or someone to help you manage your care at home? No     Prior to hospitilization cognitive status: Alert/Oriented     Current cognitive status: Alert/Oriented     Walking or Climbing Stairs Difficulty no     Dressing/Bathing Difficulty no     Home Accessibility not  wheelchair accessible     Home Layout Able to live on 1st floor     Equipment Currently Used at Home none     Readmission within 30 days? No     Patient currently being followed by outpatient case management? No     Do you currently have service(s) that help you manage your care at home? No     Do you take prescription medications? Yes     Do you have prescription coverage? Yes     Coverage MEDICARE - MEDICARE PART A & B     Do you have any problems affording any of your prescribed medications? No     Is the patient taking medications as prescribed? yes     Who is going to help you get home at discharge? Self. Patient stated he will drive home     How do you get to doctors appointments? car, drives self     Are you on dialysis? Yes     Dialysis Name and Scheduled days [facility]     Do you take coumadin? Yes (P)      Who monitors your labs? [facility] (P)      Discharge Plan A Home (P)      DME Needed Upon Discharge  none (P)      Discharge Plan discussed with: Patient (P)      Transition of Care Barriers None (P)                           Светлана Soni LMSW 2/17/2024 11:51 AM

## 2024-02-17 NOTE — CONSULTS
[facility] - Tele[facility] ([facility])  Cardiology  Consult Note    Patient Name: Isidro White Jr.  MRN: [id number]  Admission Date: 2/16/2024  Hospital Length of Stay: 1 days  Code Status: Full Code   Attending Provider: Bonifacio Shirley MD   Consulting Provider: Shai Calderon MD  Primary Care Physician: Boston Nevarez MD  Principal Problem:Preoperative clearance    Patient information was obtained from patient and ER records.     Inpatient consult to Cardiology  Consult performed by: Shai Calderon MD  Consult ordered by: Bonifacio Shirley MD        Subjective:     Chief Complaint:  coumadin bridge     HPI:    54 y.o. male pt with PAF, ASD closure at age 8 ([facility]), HFpEF, s/p AVR with a 22 mm mechanical valve and TV annuloplasty with a 28 mm ring 3/17, HTN, PHTN, Mitral stenosis, SVT, EP performed RFA (3/13/17) , ESRD presents to the hospital as a direct admit for bridging of coumadin with heparin to have penile prosthesis surgery performed by Urology (Dr. Shirley) on 02/20/2024. Patient denies CP, angina or anginal equivalent.    Past Medical History:   Diagnosis Date    Anemia     Aortic valve stenosis     s/p mechanical AVR    Atrial fibrillation     Atrial flutter     Cardiomyopathy     CHF (congestive heart failure)     Drug-induced erectile dysfunction     ESRD due to hypertension     HD M, W, F    ESRD on dialysis     GERD (gastroesophageal reflux disease)     Hepatitis B     Hyperlipidemia     Hypertension     Nightmares     Obesity     DANIEL (obstructive sleep apnea) 11/12/2019    Secondary hyperparathyroidism of renal origin     Supraventricular tachycardia     atrial tachycardia    Tachycardia     Valvular regurgitation     AI, TR       Past Surgical History:   Procedure Laterality Date    ASD REPAIR      age 8 [facility]    AV Graft Creation Left 03/2017    CARDIAC CATHETERIZATION      [facility]     CARDIAC VALVE SURGERY  02/08/2017    22 mm Medtronic AV, 28 mm TV  Medtronic annuloplaty ring    COLONOSCOPY N/A 8/27/2019    Procedure: COLONOSCOPY;  Surgeon: Addie John MD;  Location: [facility] ENDO;  Service: Endoscopy;  Laterality: N/A;  dialysis pt *needs K*    COLONOSCOPY N/A 9/3/2020    Procedure: COLONOSCOPY-need INR;  Surgeon: Jemma Youngblood MD;  Location: [facility] ENDO;  Service: Endoscopy;  Laterality: N/A;    ESOPHAGOGASTRODUODENOSCOPY N/A 8/27/2019    Procedure: EGD (ESOPHAGOGASTRODUODENOSCOPY);  Surgeon: Addie John MD;  Location: [facility] ENDO;  Service: Endoscopy;  Laterality: N/A;    RADIOFREQUENCY ABLATION  03/13/2017    Atrial tachycardia    REMOVAL OF GRAFT Left 7/2/2020    Procedure: REMOVAL, GRAFT;  Surgeon: Sascha Sidhu MD;  Location: [facility] OR;  Service: Peripheral Vascular;  Laterality: Left;    RIGHT HEART CATHETERIZATION Right 8/12/2021    Procedure: INSERTION, CATHETER, RIGHT HEART;  Surgeon: Mariah Pierce MD;  Location: [facility] CATH LAB;  Service: Cardiology;  Laterality: Right;       Review of patient's allergies indicates:  No Known Allergies    Current Facility-Administered Medications on File Prior to Encounter   Medication    0.9%  NaCl infusion    sodium chloride 0.9% flush 10 mL     Current Outpatient Medications on File Prior to Encounter   Medication Sig    albuterol (VENTOLIN HFA) 90 mcg/actuation inhaler Inhale 2 puffs into the lungs every 6 (six) hours as needed for Wheezing or Shortness of Breath. Rescue (Patient not taking: Reported on 1/25/2024)    ascorbic acid, vitamin C, (VITAMIN C) 500 MG tablet Take 1 tablet (500 mg total) by mouth 2 (two) times daily.    b complex vitamins tablet Take 1 tablet by mouth once daily.    B complex-vitamin C-folic acid (ETHAN-RACQUEL) 0.8 mg Tab Take 1 tablet by mouth once daily    calcium acetate,phosphat bind, (PHOSLO) 667 mg tablet Take 3 capsules by mouth three times daily with meals AND take 1 capsule by mouth twice daily with snacks    dialysis solutions (PERITONEAL DIALYSIS SOLUTION IP) Inject into the  peritoneum every Mon, Wed, Fri. Lt FA Fistula, [facility] @ [facility] on M, W, F.    epoetin vikki (PROCRIT) 10,000 unit/mL injection Inject 1 mL (10,000 Units total) into the skin every Mon, Wed, Fri. To be given with HD    ferrous sulfate 324 mg (65 mg iron) TbEC Take 325 mg by mouth once daily.    fish oil-omega-3 fatty acids 300-1,000 mg capsule Take 2 g by mouth once daily.    hydrOXYzine HCL (ATARAX) 25 MG tablet Take 1 tablet by mouth daily as needed for itching    hydrOXYzine HCL (ATARAX) 25 MG tablet take one tablet by mouth daily as needed for itching    multivitamin with minerals tablet Take 1 tablet by mouth once daily.    omega-3 fatty acids-vitamin E (FISH OIL) 1,000 mg Cap Take 1 capsule by mouth once daily.    pantoprazole (PROTONIX) 40 MG tablet Take 1 tablet by mouth once daily    pep injection Inject 0.3 ml as directed     For compounding pharmacy use:   Add PAPAVERINE 30 mcg  Add PHENTOLAMINE 10 mg  Add ALPROSTADIL 100 mcg    sildenafiL (VIAGRA) 100 MG tablet Take 1 tablet (100 mg total) by mouth daily as needed for Erectile Dysfunction.    vitamin D (VITAMIN D3) 1000 units Tab Take 1 tablet by mouth daily    warfarin (COUMADIN) 7.5 MG tablet Take 2 tablets by mouth on Tuesday, Thursdays, and Saturday; and 1 tablet on all other days of the week or as directed by coumadin clinic. (Patient taking differently: Take 1 tablet (7.5 mg) by mouth on Mondays, Wednesdays and Fridays; and 1.5 tablets (11.25) on all other days of the week or as directed by coumadin clinic.)     Family History       Problem Relation (Age of Onset)    Anesthesia problems Paternal Uncle    Cancer Mother    Diabetes Paternal Aunt, Paternal Aunt    Heart attack Father    Heart failure Paternal Grandmother    Hypertension Paternal Aunt    Leukemia Mother, Paternal Aunt    No Known Problems Sister, Brother, Sister, Sister    Stroke Paternal Aunt    Suicide Paternal Uncle    Valvular heart disease Maternal Aunt          Tobacco Use     Smoking status: Never    Smokeless tobacco: Never   Substance and Sexual Activity    Alcohol use: No     Comment: quit in 2016; does have heavy drinking history; started drinking at age 13; was drinking a 12 pack over the weekend and two 24 ounces of beer a day during the week along with a pint of hard alcohol    Drug use: No    Sexual activity: Not Currently     Review of Systems   Constitutional: Negative. Negative for weight gain.   HENT: Negative.     Eyes: Negative.    Cardiovascular: Negative.  Negative for chest pain, leg swelling and palpitations.   Respiratory: Negative.  Negative for shortness of breath.    Endocrine: Negative.    Hematologic/Lymphatic: Negative.    Skin: Negative.    Musculoskeletal:  Negative for muscle weakness.   Gastrointestinal: Negative.    Genitourinary: Negative.    Neurological: Negative.  Negative for dizziness.   Psychiatric/Behavioral: Negative.     Allergic/Immunologic: Negative.    All other systems reviewed and are negative.    Objective:     Vital Signs (Most Recent):  Temp: 98 °F (36.7 °C) (02/17/24 0354)  Pulse: 78 (02/17/24 0400)  Resp: 18 (02/17/24 0354)  BP: (Abnormal) 95/54 (02/17/24 0354)  SpO2: 95 % (02/17/24 0354) Vital Signs (24h Range):  Temp:  [98 °F (36.7 °C)-98.1 °F (36.7 °C)] 98 °F (36.7 °C)  Pulse:  [73-78] 78  Resp:  [16-18] 18  SpO2:  [94 %-95 %] 95 %  BP: ()/(51-59) 95/54     Weight: 96.6 kg (212 lb 15.4 oz)  Body mass index is 27.34 kg/m².    SpO2: 95 %       No intake or output data in the 24 hours ending 02/17/24 0739    Lines/Drains/Airways       Central Venous Catheter Line       Name Duration         Hemodialysis Catheter 07/02/20 right internal jugular 1325 days         Hemodialysis AV Graft 07/04/20 0701 Left upper arm 1323 days              Peripheral Intravenous Line       Name Duration         Peripheral IV - Single Lumen 02/16/24 0953 20 G Anterior;Right Forearm <1 day                     Physical Exam  Vitals and nursing note  reviewed.   Constitutional:       Appearance: He is well-developed.   HENT:      Head: Normocephalic and atraumatic.   Eyes:      Conjunctiva/sclera: Conjunctivae normal.      Pupils: Pupils are equal, round, and reactive to light.   Cardiovascular:      Rate and Rhythm: Normal rate and regular rhythm.      Pulses: Intact distal pulses.      Heart sounds: Normal heart sounds. Murmur: metallic click.   Pulmonary:      Effort: Pulmonary effort is normal.      Breath sounds: Normal breath sounds.   Abdominal:      General: Bowel sounds are normal.      Palpations: Abdomen is soft.   Musculoskeletal:      Cervical back: Normal range of motion and neck supple.   Skin:     General: Skin is warm and dry.   Neurological:      Mental Status: He is alert and oriented to person, place, and time.          Significant Labs: All pertinent lab results from the last 24 hours have been reviewed.    Significant Imaging: X-Ray: CXR: X-Ray Chest 1 View (CXR): No results found for this visit on 02/16/24.  Assessment and Plan:     History of mechanical aortic valve replacement   54 y.o. male pt with PAF, ASD closure at age 8 ([facility]), HFpEF, s/p AVR with a 22 mm mechanical valve and TV annuloplasty with a 28 mm ring 3/17, HTN, PHTN, Mitral stenosis, SVT, EP performed RFA (3/13/17) , ESRD presents to the hospital as a direct admit for bridging of coumadin with heparin to have penile prosthesis surgery performed by Urology (Dr. Shirley) on 02/20/2024. Patient denies CP, angina or anginal equivalent.    Post surgery heparin and coumadin to start once ok with surgeon.        VTE Risk Mitigation (From admission, onward)           Ordered     heparin 25,000 units in dextrose 5% (100 units/ml) IV bolus from bag LOW INTENSITY nomogram - [facility]  As needed (PRN)        Question:  Heparin Infusion Adjustment (DO NOT MODIFY ANSWER)  Answer:  [email or web address]\epic\Images\Pharmacy\HeparinInfusions\heparin LOW INTENSITY nomogram for [facility]  [id number]    02/16/24 2319     heparin 25,000 units in dextrose 5% (100 units/ml) IV bolus from bag LOW INTENSITY nomogram - [facility]  As needed (PRN)        Question:  Heparin Infusion Adjustment (DO NOT MODIFY ANSWER)  Answer:  [email or web address]\epic\Images\Pharmacy\HeparinInfusions\heparin LOW INTENSITY nomogram for [facility] [id number]    02/16/24 2319     heparin 25,000 units in dextrose 5% 250 mL (100 units/mL) infusion LOW INTENSITY nomogram - [facility]  Continuous        Question:  Begin at (units/kg/hr)  Answer:  12    02/16/24 2319     Reason for No Pharmacological VTE Prophylaxis  Once        Comments: Pending CoAg levels for heparin bridge   Question:  Reasons:  Answer:  Physician Provided (leave comment)    02/16/24 2116     Place FRANDY hose  Until discontinued         02/16/24 2018     IP VTE HIGH RISK PATIENT  Once         02/16/24 2018     Place sequential compression device  Until discontinued         02/16/24 2018                    Thank you for your consult. I will follow-up with patient. Please contact us if you have any additional questions.    Shai Calderon MD  Cardiology   [facility] - Telemetry ([facility])

## 2024-02-17 NOTE — ASSESSMENT & PLAN NOTE
Patient with Paroxysmal (<7 days) atrial fibrillation which is controlled currently with Beta Blocker and Calcium Channel Blocker. Patient is currently in atrial fibrillation.[id number] Score: The patient doesn't have any registry metric data available. HASBLED Score: . Anticoagulation indicated. Anticoagulation done with coumadin, but bridging with heparin for scheduled surgical procedure .

## 2024-02-17 NOTE — ASSESSMENT & PLAN NOTE
Patient has chronic liver disease due to hepatitis b. Their liver disease is compensated. Hepatology has not been consulted. We will obtain daily CBC, CMP, and INR.Will continue Tenofovir. Their most current Na-MELD is listed below.  MELD 3.0: 24 at 10/10/2023  9:27 AM  MELD-Na: 26 at 10/10/2023  9:27 AM  Calculated from:  Serum Creatinine: On dialysis. Using the maximum value.  Serum Sodium: 143 mmol/L (Using max of 137 mmol/L) at 10/10/2023  8:31 AM  Total Bilirubin: 0.5 mg/dL (Using min of 1 mg/dL) at 10/10/2023  8:31 AM  Serum Albumin: 3.3 g/dL at 10/10/2023  8:31 AM  INR(ratio): 1.8 at 10/10/2023  9:27 AM  Age at listing (hypothetical): 53 years  Sex: Male at 10/10/2023  9:27 AM

## 2024-02-17 NOTE — NURSING TRANSFER
Nursing Transfer Note      2/17/2024   1:22 AM    Nurse giving handoff:Direct admit  Nurse receiving handoff:Jordyn     Reason patient is being transferred: Surgery for Erectile dysfuntion    Transfer To:336      Transfer via Ambulatory     Transfer with n/a    Transported by Ambulatory     Transfer Vital Signs:  Blood Pressure:106/59  Heart Rate:76    O2:94    Temperature:98.1    Respirations:18      Telemetry: Box Number [id number], Rate 76, Rhythm NS, and Telemetry  Irma  Order for Tele Monitor? Yes    Additional Lines:     4eyes on Skin: yes    Medicines sent: n/a    Any special needs or follow-up needed: n/a    Patient belongings transferred with patient: Yes    Chart send with patient: No    Notified: pt notified family     Patient reassessed at:  (date, time)  1  Upon arrival to floor: cardiac monitor applied, patient oriented to room, call bell in reach, and bed in lowest position

## 2024-02-17 NOTE — H&P
Chief Complaint:  Erectile dysfunction, PAF, ASD, status post AVR repair, mitral stenosis, dialysis dependent, here for Coumadin bridge    HPI:   2/17/24-  54-year-old gentleman well known to the service, admitted preoperatively for Coumadin bridge for placement of penile prosthesis.      Allergies:  Patient has no known allergies.    Medications:  See MAR    Review of Systems:  General: No fever, chills, fatigability, or weight loss.  Skin: No rashes, itching, or changes in color or texture of skin.  Chest: Denies HAY, cyanosis, wheezing, cough, and sputum production.  Abdomen: Appetite fine. No weight loss. Denies diarrhea, abdominal pain, hematemesis, or blood in stool.  Musculoskeletal: No joint stiffness or swelling. Denies back pain.  : As above.  All other review of systems negative.    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    PSH:   has a past surgical history that includes ASD repair; Cardiac valuve replacement (02/08/2017); Radiofrequency ablation (03/13/2017); Cardiac catheterization; AV Graft Creation (Left, 03/2017); Colonoscopy (N/A, 8/27/2019); Esophagogastroduodenoscopy (N/A, 8/27/2019); Removal of graft (Left, 7/2/2020); Colonoscopy (N/A, 9/3/2020); and Right heart catheterization (Right, 8/12/2021).    FamHx: family history includes Anesthesia problems in his paternal uncle; Cancer in his mother; Diabetes in his paternal aunt and paternal aunt; Heart attack in his father; Heart failure in his paternal grandmother; Hypertension in his paternal  aunt; Leukemia in his mother and paternal aunt; No Known Problems in his brother, sister, sister, and sister; Stroke in his paternal aunt; Suicide in his paternal uncle; Valvular heart disease in his maternal aunt.    SocHx:  reports that he has never smoked. He has never used smokeless tobacco. He reports that he does not drink alcohol and does not use drugs.      Physical Exam:  Vitals:    02/17/24 0750   BP: 115/60   Pulse: 84   Resp: 18   Temp: 98.6 °F (37 °C)     General: A&Ox3, no apparent distress, no deformities  Neck: No masses, normal ROM  Lungs: normal inspiration, no use of accessory muscles  Heart: normal pulse, no arrhythmias  Abdomen: Soft, NT, ND, no masses, no hernias, no hepatosplenomegaly  Skin: The skin is warm and dry. No jaundice.  Ext: No c/c/e.    Labs/Studies:   CBC demonstrates an H&H of 11.3 and 34.3 respectively.  INR 1.7   BMP demonstrates a BUN and creatinine of 42 and 11.4 respectively.    Impression/Plan:     1. History of mechanical aortic valve replacement- cardiology on board, heparin drip initiated last night.  Coumadin it has been held since 02/15/2024.    2. Chronic diastolic heart failure- CHF controlled, cardiology on board.    3. Essential hypertension- blood pressure stable, appreciate cardiology in hospital medicine assistance.    4. End-stage renal disease- nephrology consulted, patient is a MWF dialysis patient, received full dialysis yesterday 02/16/2024.    5. Atrial fibrillation- control with beta-blocker and calcium channel blocker, current Coumadin bridge for anticoagulation.    6. Erectile dysfunction- patient is planned for inflatable penile prosthesis on Tuesday 02/20/2024.

## 2024-02-17 NOTE — PLAN OF CARE
[id number]/[id number] MELVI White Jr. is a 54 y.o.male admitted on 2/16/2024 for Preoperative clearance   Code Status: Full Code MRN: [id number]   Review of patient's allergies indicates:  No Known Allergies  Past Medical History:   Diagnosis Date    Anemia     Aortic valve stenosis     s/p mechanical AVR    Atrial fibrillation     Atrial flutter     Cardiomyopathy     CHF (congestive heart failure)     Drug-induced erectile dysfunction     ESRD due to hypertension     HD M, W, F    ESRD on dialysis     GERD (gastroesophageal reflux disease)     Hepatitis B     Hyperlipidemia     Hypertension     Nightmares     Obesity     DANIEL (obstructive sleep apnea) 11/12/2019    Secondary hyperparathyroidism of renal origin     Supraventricular tachycardia     atrial tachycardia    Tachycardia     Valvular regurgitation     AI, TR      PRN meds    acetaminophen, 650 mg, Q4H PRN  acetaminophen, 650 mg, Q6H PRN  dextrose 10%, 12.5 g, PRN  dextrose 10%, 25 g, PRN  glucagon (human recombinant), 1 mg, PRN  glucose, 16 g, PRN  glucose, 24 g, PRN  heparin (PORCINE), 60 Units/kg (Adjusted), PRN  heparin (PORCINE), 30 Units/kg (Adjusted), PRN  hydrOXYzine HCL, 25 mg, Daily PRN  melatonin, 6 mg, Nightly PRN  naloxone, 0.02 mg, PRN  ondansetron, 4 mg, Q8H PRN  polyethylene glycol, 17 g, Daily PRN  promethazine, 25 mg, Q6H PRN  simethicone, 1 tablet, QID PRN  sodium chloride 0.9%, 10 mL, PRN      Chart check completed. Will continue plan of care.      Orientation: oriented x 4  Abi Coma Scale Score: 15     Lead Monitored: Lead II Rhythm: normal sinus rhythm Frequency/Ectopy: PVCs  Cardiac/Telemetry Box Number: [id number]  VTE Required Core Measure: Pharmacological prophylaxis initiated/maintained Last Bowel Movement: 02/16/24  Diet Cardiac Standard Tray  Diet NPO     Ky Score: 22  Fall Risk Score: 7  Accucheck []   Freq?      Lines/Drains/Airways       Central Venous Catheter Line  Duration                  Hemodialysis Catheter 07/02/20 right  internal jugular 1325 days         Hemodialysis AV Graft 07/04/20 0701 Left upper arm 1323 days              Peripheral Intravenous Line  Duration                  Peripheral IV - Single Lumen 02/16/24 0953 20 G Anterior;Right Forearm 1 day                       Problem: Adult Inpatient Plan of Care  Goal: Plan of Care Review  Outcome: Ongoing, Progressing  Goal: Patient-Specific Goal (Individualized)  Outcome: Ongoing, Progressing  Goal: Absence of Hospital-Acquired Illness or Injury  Outcome: Ongoing, Progressing  Goal: Optimal Comfort and Wellbeing  Outcome: Ongoing, Progressing  Goal: Readiness for Transition of Care  Outcome: Ongoing, Progressing

## 2024-02-17 NOTE — ASSESSMENT & PLAN NOTE
Chronic, controlled. Latest blood pressure and vitals reviewed-     Temp:  [98.1 °F (36.7 °C)]   Pulse:  [76-78]   Resp:  [18]   BP: (106)/(59)   SpO2:  [94 %] .   Home meds for hypertension were reviewed and noted below.   Hypertension Medications               amLODIPine (NORVASC) 10 MG tablet Take 1 tablet by mouth daily    diltiaZEM (TIADYLT ER) 420 MG Cs24 Take 1 capsule (420 mg total) by mouth once daily.    hydrALAZINE (APRESOLINE) 100 MG tablet Take 1 tablet by mouth three times a day including dialysis days    labetaloL (NORMODYNE) 200 MG tablet Take 1 tablet (200 mg total) by mouth 3 (three) times daily.    lisinopriL (PRINIVIL,ZESTRIL) 40 MG tablet Take 1 tablet (40 mg total) by mouth once daily.    pep injection Inject 0.3 ml as directed     For compounding pharmacy use:   Add PAPAVERINE 30 mcg  Add PHENTOLAMINE 10 mg  Add ALPROSTADIL 100 mcg            While in the hospital, will manage blood pressure as follows; Continue home antihypertensive regimen    Will utilize p.r.n. blood pressure medication only if patient's blood pressure greater than 160/100 and he develops symptoms such as worsening chest pain or shortness of breath.

## 2024-02-17 NOTE — ASSESSMENT & PLAN NOTE
"Patient is identified as having Diastolic (HFpEF) heart failure that is Chronic. CHF is currently controlled. Latest ECHO performed and demonstrates- Results for orders placed during the hospital encounter of 04/27/23    Echo    Interpretation Summary  · The left ventricle is normal in size with concentric remodeling and low normal systolic function.  · Mild left atrial enlargement.  · Grade I left ventricular diastolic dysfunction.  · The estimated PA systolic pressure is 47 mmHg.  · Normal right ventricular size with normal right ventricular systolic function.  · There is pulmonary hypertension.  · Normal central venous pressure (3 mmHg).  · There is a bileaflet tilting disc mechanical aortic valve present. Prosthetic aortic valve is normal.  · The aortic valve mean gradient is 19 mmHg with a dimensionless index of 0.47.  · The estimated ejection fraction is 50%.  · The mean diastolic gradient across the mitral valve is 11 mmHg at a heart rate of 76 bpm.  · There is mild to moderate mitral stenosis.  · Mild to moderate tricuspid regurgitation.  . Continue Beta Blocker and monitor clinical status closely. Monitor on telemetry. Patient is off CHF pathway.  Monitor strict Is&Os and daily weights.  Place on fluid restriction of 1.5 L. Continue to stress to patient importance of self efficacy and  on diet for CHF. Last BNP reviewed- and noted below No results for input(s): "BNP", "BNPTRIAGEBLO" in the last 168 hours..          "

## 2024-02-17 NOTE — ASSESSMENT & PLAN NOTE
Creatine stable for now. BMP reviewed- noted Estimated Creatinine Clearance: 8.7 mL/min (A) (based on SCr of 11.4 mg/dL (H)). according to latest data. Based on current GFR, CKD stage is end stage.  Monitor UOP and serial BMP and adjust therapy as needed. Renally dose meds. Avoid nephrotoxic medications and procedures. Nephrology consult for management of HD orders.

## 2024-02-17 NOTE — HPI
54 y.o. male pt with PAF, ASD closure at age 8 ([facility]), HFpEF, s/p AVR with a 22 mm mechanical valve and TV annuloplasty with a 28 mm ring 3/17, HTN, PHTN, Mitral stenosis, SVT, EP performed RFA (3/13/17) , ESRD presents to the hospital as a direct admit for bridging of coumadin with heparin to have penile prosthesis surgery performed by Urology (Dr. Shirley) on 02/20/2024. Patient denies CP, angina or anginal equivalent.

## 2024-02-17 NOTE — SUBJECTIVE & OBJECTIVE
Past Medical History:   Diagnosis Date    Anemia     Aortic valve stenosis     s/p mechanical AVR    Atrial fibrillation     Atrial flutter     Cardiomyopathy     CHF (congestive heart failure)     Drug-induced erectile dysfunction     ESRD due to hypertension     HD M, W, F    ESRD on dialysis     GERD (gastroesophageal reflux disease)     Hepatitis B     Hyperlipidemia     Hypertension     Nightmares     Obesity     DANIEL (obstructive sleep apnea) 11/12/2019    Secondary hyperparathyroidism of renal origin     Supraventricular tachycardia     atrial tachycardia    Tachycardia     Valvular regurgitation     AI, TR       Past Surgical History:   Procedure Laterality Date    ASD REPAIR      age 8 [facility]    AV Graft Creation Left 03/2017    CARDIAC CATHETERIZATION      [facility]     CARDIAC VALVE SURGERY  02/08/2017    22 mm Medtronic AV, 28 mm TV Medtronic annuloplaty ring    COLONOSCOPY N/A 8/27/2019    Procedure: COLONOSCOPY;  Surgeon: Addie John MD;  Location: [facility] ENDO;  Service: Endoscopy;  Laterality: N/A;  dialysis pt *needs K*    COLONOSCOPY N/A 9/3/2020    Procedure: COLONOSCOPY-need INR;  Surgeon: Jemma Youngblood MD;  Location: [facility] ENDO;  Service: Endoscopy;  Laterality: N/A;    ESOPHAGOGASTRODUODENOSCOPY N/A 8/27/2019    Procedure: EGD (ESOPHAGOGASTRODUODENOSCOPY);  Surgeon: Addie John MD;  Location: [facility] ENDO;  Service: Endoscopy;  Laterality: N/A;    RADIOFREQUENCY ABLATION  03/13/2017    Atrial tachycardia    REMOVAL OF GRAFT Left 7/2/2020    Procedure: REMOVAL, GRAFT;  Surgeon: Sascha Sidhu MD;  Location: [facility] OR;  Service: Peripheral Vascular;  Laterality: Left;    RIGHT HEART CATHETERIZATION Right 8/12/2021    Procedure: INSERTION, CATHETER, RIGHT HEART;  Surgeon: Mariah Pierce MD;  Location: [facility] CATH LAB;  Service: Cardiology;  Laterality: Right;       Review of patient's allergies indicates:  No Known Allergies    Current Facility-Administered Medications on File Prior to  Encounter   Medication    0.9%  NaCl infusion    sodium chloride 0.9% flush 10 mL     Current Outpatient Medications on File Prior to Encounter   Medication Sig    albuterol (VENTOLIN HFA) 90 mcg/actuation inhaler Inhale 2 puffs into the lungs every 6 (six) hours as needed for Wheezing or Shortness of Breath. Rescue (Patient not taking: Reported on 1/25/2024)    ascorbic acid, vitamin C, (VITAMIN C) 500 MG tablet Take 1 tablet (500 mg total) by mouth 2 (two) times daily.    b complex vitamins tablet Take 1 tablet by mouth once daily.    B complex-vitamin C-folic acid (ETHAN-RACQUEL) 0.8 mg Tab Take 1 tablet by mouth once daily    calcium acetate,phosphat bind, (PHOSLO) 667 mg tablet Take 3 capsules by mouth three times daily with meals AND take 1 capsule by mouth twice daily with snacks    dialysis solutions (PERITONEAL DIALYSIS SOLUTION IP) Inject into the peritoneum every Mon, Wed, Fri. Lt FA Fistula, [facility] [place] on M, W, F.    epoetin vikki (PROCRIT) 10,000 unit/mL injection Inject 1 mL (10,000 Units total) into the skin every Mon, Wed, Fri. To be given with HD    ferrous sulfate 324 mg (65 mg iron) TbEC Take 325 mg by mouth once daily.    fish oil-omega-3 fatty acids 300-1,000 mg capsule Take 2 g by mouth once daily.    hydrOXYzine HCL (ATARAX) 25 MG tablet Take 1 tablet by mouth daily as needed for itching    hydrOXYzine HCL (ATARAX) 25 MG tablet take one tablet by mouth daily as needed for itching    multivitamin with minerals tablet Take 1 tablet by mouth once daily.    omega-3 fatty acids-vitamin E (FISH OIL) 1,000 mg Cap Take 1 capsule by mouth once daily.    pantoprazole (PROTONIX) 40 MG tablet Take 1 tablet by mouth once daily    pep injection Inject 0.3 ml as directed     For compounding pharmacy use:   Add PAPAVERINE 30 mcg  Add PHENTOLAMINE 10 mg  Add ALPROSTADIL 100 mcg    sildenafiL (VIAGRA) 100 MG tablet Take 1 tablet (100 mg total) by mouth daily as needed for Erectile Dysfunction.     vitamin D (VITAMIN D3) 1000 units Tab Take 1 tablet by mouth daily    warfarin (COUMADIN) 7.5 MG tablet Take 2 tablets by mouth on Tuesday, Thursdays, and Saturday; and 1 tablet on all other days of the week or as directed by coumadin clinic. (Patient taking differently: Take 1 tablet (7.5 mg) by mouth on Mondays, Wednesdays and Fridays; and 1.5 tablets (11.25) on all other days of the week or as directed by coumadin clinic.)     Family History       Problem Relation (Age of Onset)    Anesthesia problems Paternal Uncle    Cancer Mother    Diabetes Paternal Aunt, Paternal Aunt    Heart attack Father    Heart failure Paternal Grandmother    Hypertension Paternal Aunt    Leukemia Mother, Paternal Aunt    No Known Problems Sister, Brother, Sister, Sister    Stroke Paternal Aunt    Suicide Paternal Uncle    Valvular heart disease Maternal Aunt          Tobacco Use    Smoking status: Never    Smokeless tobacco: Never   Substance and Sexual Activity    Alcohol use: No     Comment: quit in 2016; does have heavy drinking history; started drinking at age 13; was drinking a 12 pack over the weekend and two 24 ounces of beer a day during the week along with a pint of hard alcohol    Drug use: No    Sexual activity: Not Currently     Review of Systems   Constitutional: Negative. Negative for weight gain.   HENT: Negative.     Eyes: Negative.    Cardiovascular: Negative.  Negative for chest pain, leg swelling and palpitations.   Respiratory: Negative.  Negative for shortness of breath.    Endocrine: Negative.    Hematologic/Lymphatic: Negative.    Skin: Negative.    Musculoskeletal:  Negative for muscle weakness.   Gastrointestinal: Negative.    Genitourinary: Negative.    Neurological: Negative.  Negative for dizziness.   Psychiatric/Behavioral: Negative.     Allergic/Immunologic: Negative.    All other systems reviewed and are negative.    Objective:     Vital Signs (Most Recent):  Temp: 98 °F (36.7 °C) (02/17/24 0354)  Pulse:  78 (02/17/24 0400)  Resp: 18 (02/17/24 0354)  BP: (Abnormal) 95/54 (02/17/24 0354)  SpO2: 95 % (02/17/24 0354) Vital Signs (24h Range):  Temp:  [98 °F (36.7 °C)-98.1 °F (36.7 °C)] 98 °F (36.7 °C)  Pulse:  [73-78] 78  Resp:  [16-18] 18  SpO2:  [94 %-95 %] 95 %  BP: ()/(51-59) 95/54     Weight: 96.6 kg (212 lb 15.4 oz)  Body mass index is 27.34 kg/m².    SpO2: 95 %       No intake or output data in the 24 hours ending 02/17/24 0739    Lines/Drains/Airways       Central Venous Catheter Line       Name Duration         Hemodialysis Catheter 07/02/20 right internal jugular 1325 days         Hemodialysis AV Graft 07/04/20 0701 Left upper arm 1323 days              Peripheral Intravenous Line       Name Duration         Peripheral IV - Single Lumen 02/16/24 0953 20 G Anterior;Right Forearm <1 day                     Physical Exam  Vitals and nursing note reviewed.   Constitutional:       Appearance: He is well-developed.   HENT:      Head: Normocephalic and atraumatic.   Eyes:      Conjunctiva/sclera: Conjunctivae normal.      Pupils: Pupils are equal, round, and reactive to light.   Cardiovascular:      Rate and Rhythm: Normal rate and regular rhythm.      Pulses: Intact distal pulses.      Heart sounds: Normal heart sounds. Murmur: metallic click.   Pulmonary:      Effort: Pulmonary effort is normal.      Breath sounds: Normal breath sounds.   Abdominal:      General: Bowel sounds are normal.      Palpations: Abdomen is soft.   Musculoskeletal:      Cervical back: Normal range of motion and neck supple.   Skin:     General: Skin is warm and dry.   Neurological:      Mental Status: He is alert and oriented to person, place, and time.          Significant Labs: All pertinent lab results from the last 24 hours have been reviewed.    Significant Imaging: X-Ray: CXR: X-Ray Chest 1 View (CXR): No results found for this visit on 02/16/24.

## 2024-02-17 NOTE — ASSESSMENT & PLAN NOTE
54 y.o. male pt with PAF, ASD closure at age 8 ([facility]), HFpEF, s/p AVR with a 22 mm mechanical valve and TV annuloplasty with a 28 mm ring 3/17, HTN, PHTN, Mitral stenosis, SVT, EP performed RFA (3/13/17) , ESRD presents to the hospital as a direct admit for bridging of coumadin with heparin to have penile prosthesis surgery performed by Urology (Dr. Shirley) on 02/20/2024. Patient denies CP, angina or anginal equivalent.    Post surgery heparin and coumadin to start once ok with surgeon.

## 2024-02-17 NOTE — CONSULTS
[facility])  [facility] Medicine  Consult Note    Patient Name: Isidro White Jr.  MRN: [id number]  Admission Date: 2/16/2024  Hospital Length of Stay: 0 days  Attending Physician: Bonifacio Shirley MD   Primary Care Provider: Boston Nevarez MD           Patient information was obtained from patient, past medical records, and ER records.     Consults  Subjective:     Principal Problem: Preoperative clearance    Chief Complaint: No chief complaint on file.       HPI: Isidro White Jr. is a 54 y.o. 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 Presents to the hospital as a direct admit for bridging of coumadin with heparin to have penile prosthesis surgery performed by Urology (Dr. Shirley) on 02/20/2024.  Patient receives HD on M/W/F and states he received his full dialysis session prior to arrival to the hospital.  States he does not produce urine. Reports last dose of Coumadin was yesterday.  Denies any complaints at this time.  Hospital Medicine consulted to manage comorbidities/medical problems and to manage heparin drip.    PCP: Boston Nevarez      Past Medical History:   Diagnosis Date    Anemia     Aortic valve stenosis     s/p mechanical AVR    Atrial fibrillation     Atrial flutter     Cardiomyopathy     CHF (congestive heart failure)     Drug-induced erectile dysfunction     ESRD due to hypertension     HD M, W, F    ESRD on dialysis     GERD (gastroesophageal reflux disease)     Hepatitis B     Hyperlipidemia      Hypertension     Nightmares     Obesity     DANIEL (obstructive sleep apnea) 11/12/2019    Secondary hyperparathyroidism of renal origin     Supraventricular tachycardia     atrial tachycardia    Tachycardia     Valvular regurgitation     AI, TR       Past Surgical History:   Procedure Laterality Date    ASD REPAIR      age 8 [facility]    AV Graft Creation Left 03/2017    CARDIAC CATHETERIZATION      [facility]     CARDIAC VALVE SURGERY  02/08/2017    22 mm Medtronic AV, 28 mm TV Medtronic annuloplaty ring    COLONOSCOPY N/A 8/27/2019    Procedure: COLONOSCOPY;  Surgeon: Addie John MD;  Location: [facility] ENDO;  Service: Endoscopy;  Laterality: N/A;  dialysis pt *needs K*    COLONOSCOPY N/A 9/3/2020    Procedure: COLONOSCOPY-need INR;  Surgeon: Jemma Youngblood MD;  Location: [facility] ENDO;  Service: Endoscopy;  Laterality: N/A;    ESOPHAGOGASTRODUODENOSCOPY N/A 8/27/2019    Procedure: EGD (ESOPHAGOGASTRODUODENOSCOPY);  Surgeon: Addie John MD;  Location: [facility] ENDO;  Service: Endoscopy;  Laterality: N/A;    RADIOFREQUENCY ABLATION  03/13/2017    Atrial tachycardia    REMOVAL OF GRAFT Left 7/2/2020    Procedure: REMOVAL, GRAFT;  Surgeon: Sascha Sidhu MD;  Location: [facility] OR;  Service: Peripheral Vascular;  Laterality: Left;    RIGHT HEART CATHETERIZATION Right 8/12/2021    Procedure: INSERTION, CATHETER, RIGHT HEART;  Surgeon: Mariah Pierce MD;  Location: [facility] CATH LAB;  Service: Cardiology;  Laterality: Right;       Review of patient's allergies indicates:  No Known Allergies    Current Facility-Administered Medications on File Prior to Encounter   Medication    0.9%  NaCl infusion    sodium chloride 0.9% flush 10 mL     Current Outpatient Medications on File Prior to Encounter   Medication Sig    albuterol (VENTOLIN HFA) 90 mcg/actuation inhaler Inhale 2 puffs into the lungs every 6 (six) hours as needed for Wheezing or Shortness of Breath. Rescue (Patient not taking: Reported on 1/25/2024)    ascorbic  acid, vitamin C, (VITAMIN C) 500 MG tablet Take 1 tablet (500 mg total) by mouth 2 (two) times daily.    b complex vitamins tablet Take 1 tablet by mouth once daily.    B complex-vitamin C-folic acid (ETHAN-RACQUEL) 0.8 mg Tab Take 1 tablet by mouth once daily    calcium acetate,phosphat bind, (PHOSLO) 667 mg tablet Take 3 capsules by mouth three times daily with meals AND take 1 capsule by mouth twice daily with snacks    dialysis solutions (PERITONEAL DIALYSIS SOLUTION IP) Inject into the peritoneum every Mon, Wed, Fri. Lt FA Fistula, [facility] on M, W, F.    epoetin vikki (PROCRIT) 10,000 unit/mL injection Inject 1 mL (10,000 Units total) into the skin every Mon, Wed, Fri. To be given with HD    ferrous sulfate 324 mg (65 mg iron) TbEC Take 325 mg by mouth once daily.    fish oil-omega-3 fatty acids 300-1,000 mg capsule Take 2 g by mouth once daily.    hydrOXYzine HCL (ATARAX) 25 MG tablet Take 1 tablet by mouth daily as needed for itching    hydrOXYzine HCL (ATARAX) 25 MG tablet take one tablet by mouth daily as needed for itching    multivitamin with minerals tablet Take 1 tablet by mouth once daily.    omega-3 fatty acids-vitamin E (FISH OIL) 1,000 mg Cap Take 1 capsule by mouth once daily.    pantoprazole (PROTONIX) 40 MG tablet Take 1 tablet by mouth once daily    pep injection Inject 0.3 ml as directed     For compounding pharmacy use:   Add PAPAVERINE 30 mcg  Add PHENTOLAMINE 10 mg  Add ALPROSTADIL 100 mcg    sildenafiL (VIAGRA) 100 MG tablet Take 1 tablet (100 mg total) by mouth daily as needed for Erectile Dysfunction.    vitamin D (VITAMIN D3) 1000 units Tab Take 1 tablet by mouth daily    warfarin (COUMADIN) 7.5 MG tablet Take 2 tablets by mouth on Tuesday, Thursdays, and Saturday; and 1 tablet on all other days of the week or as directed by coumadin clinic. (Patient taking differently: Take 1 tablet (7.5 mg) by mouth on Mondays, Wednesdays and Fridays; and 1.5 tablets (11.25) on all other  days of the week or as directed by coumadin clinic.)     Family History       Problem Relation (Age of Onset)    Anesthesia problems Paternal Uncle    Cancer Mother    Diabetes Paternal Aunt, Paternal Aunt    Heart attack Father    Heart failure Paternal Grandmother    Hypertension Paternal Aunt    Leukemia Mother, Paternal Aunt    No Known Problems Sister, Brother, Sister, Sister    Stroke Paternal Aunt    Suicide Paternal Uncle    Valvular heart disease Maternal Aunt          Tobacco Use    Smoking status: Never    Smokeless tobacco: Never   Substance and Sexual Activity    Alcohol use: No     Comment: quit in 2016; does have heavy drinking history; started drinking at age 13; was drinking a 12 pack over the weekend and two 24 ounces of beer a day during the week along with a pint of hard alcohol    Drug use: No    Sexual activity: Not Currently     Review of Systems   Constitutional:  Negative for chills, diaphoresis, fatigue and fever.   Respiratory:  Negative for cough and shortness of breath.    Cardiovascular:  Negative for chest pain, palpitations and leg swelling.   All other systems reviewed and are negative.    Objective:     Vital Signs (Most Recent):  Temp: 98.1 °F (36.7 °C) (02/16/24 2139)  Pulse: 78 (02/16/24 2144)  Resp: 18 (02/16/24 2139)  BP: (!) 106/59 (02/16/24 2139)  SpO2: (!) 94 % (02/16/24 2139) Vital Signs (24h Range):  Temp:  [98.1 °F (36.7 °C)] 98.1 °F (36.7 °C)  Pulse:  [76-78] 78  Resp:  [18] 18  SpO2:  [94 %] 94 %  BP: (106)/(59) 106/59     Weight: 96.6 kg (212 lb 15.4 oz)  Body mass index is 27.34 kg/m².     Physical Exam  Vitals and nursing note reviewed.   Constitutional:       General: He is awake. He is not in acute distress.     Appearance: Normal appearance. He is well-developed and well-groomed. He is not ill-appearing, toxic-appearing or diaphoretic.   HENT:      Head: Normocephalic and atraumatic.   Eyes:      Extraocular Movements: Extraocular movements intact.       Conjunctiva/sclera: Conjunctivae normal.   Cardiovascular:      Rate and Rhythm: Normal rate and regular rhythm.      Pulses: Normal pulses.      Heart sounds: Murmur heard.      Arteriovenous access: Left arteriovenous access is present.     Comments: +bruit and thrill noted to LLE.  Pulmonary:      Effort: Pulmonary effort is normal.      Breath sounds: Normal breath sounds. No decreased breath sounds, wheezing, rhonchi or rales.   Abdominal:      General: Bowel sounds are normal.      Palpations: Abdomen is soft.      Tenderness: There is no abdominal tenderness.   Musculoskeletal:      Cervical back: Normal range of motion and neck supple.      Right lower leg: No edema.      Left lower leg: No edema.      Comments: 5/5 strength throughout   Skin:     General: Skin is warm and dry.      Capillary Refill: Capillary refill takes less than 2 seconds.   Neurological:      General: No focal deficit present.      Mental Status: He is alert and oriented to person, place, and time. Mental status is at baseline.      GCS: GCS eye subscore is 4. GCS verbal subscore is 5. GCS motor subscore is 6.      Cranial Nerves: Cranial nerves 2-12 are intact.      Motor: Motor function is intact.   Psychiatric:         Mood and Affect: Mood normal.         Behavior: Behavior normal. Behavior is cooperative.        LABS:  Recent Results (from the past 24 hour(s))   Basic metabolic panel    Collection Time: 02/16/24  9:23 PM   Result Value Ref Range    Sodium 142 136 - 145 mmol/L    Potassium 4.0 3.5 - 5.1 mmol/L    Chloride 97 95 - 110 mmol/L    CO2 26 23 - 29 mmol/L    Glucose 94 70 - 110 mg/dL    BUN 42 (H) 6 - 20 mg/dL    Creatinine 11.4 (H) 0.5 - 1.4 mg/dL    Calcium 9.5 8.7 - 10.5 mg/dL    Anion Gap 19 (H) 8 - 16 mmol/L    eGFR 5 (A) >60 mL/min/1.73 m^2   CBC auto differential    Collection Time: 02/16/24  9:23 PM   Result Value Ref Range    WBC 6.38 3.90 - 12.70 K/uL    RBC 3.53 (L) 4.60 - 6.20 M/uL    Hemoglobin 12.0 (L) 14.0 -  18.0 g/dL    Hematocrit 35.6 (L) 40.0 - 54.0 %     (H) 82 - 98 fL    MCH 34.0 (H) 27.0 - 31.0 pg    MCHC 33.7 32.0 - 36.0 g/dL    RDW 15.9 (H) 11.5 - 14.5 %    Platelets 163 150 - 450 K/uL    MPV 11.2 9.2 - 12.9 fL    Immature Granulocytes 0.2 0.0 - 0.5 %    Gran # (ANC) 3.9 1.8 - 7.7 K/uL    Immature Grans (Abs) 0.01 0.00 - 0.04 K/uL    Lymph # 1.5 1.0 - 4.8 K/uL    Mono # 0.7 0.3 - 1.0 K/uL    Eos # 0.3 0.0 - 0.5 K/uL    Baso # 0.02 0.00 - 0.20 K/uL    nRBC 0 0 /100 WBC    Gran % 60.7 38.0 - 73.0 %    Lymph % 22.9 18.0 - 48.0 %    Mono % 11.4 4.0 - 15.0 %    Eosinophil % 4.5 0.0 - 8.0 %    Basophil % 0.3 0.0 - 1.9 %    Differential Method Automated    APTT    Collection Time: 02/16/24  9:23 PM   Result Value Ref Range    aPTT 39.1 (H) 21.0 - 32.0 sec   Protime-INR    Collection Time: 02/16/24  9:23 PM   Result Value Ref Range    Prothrombin Time 19.2 (H) 9.0 - 12.5 sec    INR 1.7 (H) 0.8 - 1.2       RADIOLOGY  No results found.    EKG    MICROBIOLOGY    MDM     Amount and/or Complexity of Data Reviewed  Clinical lab tests: reviewed  Tests in the radiology section of CPT®: reviewed  Tests in the medicine section of CPT®: reviewed  Discussion of test results with the performing providers: yes  Decide to obtain previous medical records or to obtain history from someone other than the patient: yes  Obtain history from someone other than the patient: yes  Review and summarize past medical records: yes  Discuss the patient with other providers: yes  Independent visualization of images, tracings, or specimens: yes        Assessment/Plan:     * Preoperative clearance  Admitted for heparin bridge from coumadin for scheduled penile prosthesis surgery to be performed by Dr. Shirley.  RCRI revealed class 4 risk with 15.0% 30 day risk of death, mi, cardiac arrest.  Plan:  -pre-op labs and imaging  -heparin bridge with nomogram  -manage co-morbidities   -cards consult for surgical clearance  -urology primary  team    ESRD (end stage renal disease) on dialysis  Creatine stable for now. BMP reviewed- noted Estimated Creatinine Clearance: 8.7 mL/min (A) (based on SCr of 11.4 mg/dL (H)). according to latest data. Based on current GFR, CKD stage is end stage.  Monitor UOP and serial BMP and adjust therapy as needed. Renally dose meds. Avoid nephrotoxic medications and procedures. Nephrology consult for management of HD orders.    History of mechanical aortic valve replacement  Plan:  -continue anticoagulation therpay with heparin while in hospital  -tele monitoring  -cards consulted for surgical clearance      Chronic diastolic heart failure  Patient is identified as having Diastolic (HFpEF) heart failure that is Chronic. CHF is currently controlled. Latest ECHO performed and demonstrates- Results for orders placed during the hospital encounter of 04/27/23    Echo    Interpretation Summary  · The left ventricle is normal in size with concentric remodeling and low normal systolic function.  · Mild left atrial enlargement.  · Grade I left ventricular diastolic dysfunction.  · The estimated PA systolic pressure is 47 mmHg.  · Normal right ventricular size with normal right ventricular systolic function.  · There is pulmonary hypertension.  · Normal central venous pressure (3 mmHg).  · There is a bileaflet tilting disc mechanical aortic valve present. Prosthetic aortic valve is normal.  · The aortic valve mean gradient is 19 mmHg with a dimensionless index of 0.47.  · The estimated ejection fraction is 50%.  · The mean diastolic gradient across the mitral valve is 11 mmHg at a heart rate of 76 bpm.  · There is mild to moderate mitral stenosis.  · Mild to moderate tricuspid regurgitation.  . Continue Beta Blocker and monitor clinical status closely. Monitor on telemetry. Patient is off CHF pathway.  Monitor strict Is&Os and daily weights.  Place on fluid restriction of 1.5 L. Continue to stress to patient importance of self  "efficacy and  on diet for CHF. Last BNP reviewed- and noted below No results for input(s): "BNP", "BNPTRIAGEBLO" in the last 168 hours..            Essential hypertension  Chronic, controlled. Latest blood pressure and vitals reviewed-     Temp:  [98.1 °F (36.7 °C)]   Pulse:  [76-78]   Resp:  [18]   BP: (106)/(59)   SpO2:  [94 %] .   Home meds for hypertension were reviewed and noted below.   Hypertension Medications               amLODIPine (NORVASC) 10 MG tablet Take 1 tablet by mouth daily    diltiaZEM (TIADYLT ER) 420 MG Cs24 Take 1 capsule (420 mg total) by mouth once daily.    hydrALAZINE (APRESOLINE) 100 MG tablet Take 1 tablet by mouth three times a day including dialysis days    labetaloL (NORMODYNE) 200 MG tablet Take 1 tablet (200 mg total) by mouth 3 (three) times daily.    lisinopriL (PRINIVIL,ZESTRIL) 40 MG tablet Take 1 tablet (40 mg total) by mouth once daily.    pep injection Inject 0.3 ml as directed     For compounding pharmacy use:   Add PAPAVERINE 30 mcg  Add PHENTOLAMINE 10 mg  Add ALPROSTADIL 100 mcg            While in the hospital, will manage blood pressure as follows; Continue home antihypertensive regimen    Will utilize p.r.n. blood pressure medication only if patient's blood pressure greater than 160/100 and he develops symptoms such as worsening chest pain or shortness of breath.    Atrial fibrillation  Patient with Paroxysmal (<7 days) atrial fibrillation which is controlled currently with Beta Blocker and Calcium Channel Blocker. Patient is currently in atrial fibrillation.[id number] Score: The patient doesn't have any registry metric data available. HASBLED Score: . Anticoagulation indicated. Anticoagulation done with coumadin, but bridging with heparin for scheduled surgical procedure .    Chronic hepatitis B  Patient has chronic liver disease due to hepatitis b. Their liver disease is compensated. Hepatology has not been consulted. We will obtain daily CBC, CMP, and " INR.Will continue Tenofovir. Their most current Na-MELD is listed below.  MELD 3.0: 24 at 10/10/2023  9:27 AM  MELD-Na: 26 at 10/10/2023  9:27 AM  Calculated from:  Serum Creatinine: On dialysis. Using the maximum value.  Serum Sodium: 143 mmol/L (Using max of 137 mmol/L) at 10/10/2023  8:31 AM  Total Bilirubin: 0.5 mg/dL (Using min of 1 mg/dL) at 10/10/2023  8:31 AM  Serum Albumin: 3.3 g/dL at 10/10/2023  8:31 AM  INR(ratio): 1.8 at 10/10/2023  9:27 AM  Age at listing (hypothetical): 53 years  Sex: Male at 10/10/2023  9:27 AM        GERD (gastroesophageal reflux disease)  Chronic. Stable. Currently asymptomatic. Home medications include PPI/Antacids as needed.  Plan:  -Continue PPI/Antacids as needed         Hypertriglyceridemia  Patient is not chronically on statin.will not continue for now. Last Lipid Panel:   Lab Results   Component Value Date    CHOL 248 (H) 11/05/2020    HDL 42 11/05/2020    LDLCALC 164.8 (H) 11/05/2020    TRIG 206 (H) 11/05/2020    CHOLHDL 16.9 (L) 11/05/2020     Plan:  -Continue home medication  -low fat/low calorie diet          VTE Risk Mitigation (From admission, onward)           Ordered     Reason for No Pharmacological VTE Prophylaxis  Once        Comments: Pending CoAg levels for heparin bridge   Question:  Reasons:  Answer:  Physician Provided (leave comment)    02/16/24 2116     Place FRANDY hose  Until discontinued         02/16/24 2018     IP VTE HIGH RISK PATIENT  Once         02/16/24 2018     Place sequential compression device  Until discontinued         02/16/24 2018                      Thank you for your consult. Hospital Medicine will follow-up with patient. Please contact us if you have any additional questions.    Nimesh Rosas NP  Department of Hospital Medicine   [facility] - Telemetry ([facility])

## 2024-02-17 NOTE — ASSESSMENT & PLAN NOTE
Patient is not chronically on statin.will not continue for now. Last Lipid Panel:   Lab Results   Component Value Date    CHOL 248 (H) 11/05/2020    HDL 42 11/05/2020    LDLCALC 164.8 (H) 11/05/2020    TRIG 206 (H) 11/05/2020    CHOLHDL 16.9 (L) 11/05/2020     Plan:  -Continue home medication  -low fat/low calorie diet

## 2024-02-17 NOTE — ASSESSMENT & PLAN NOTE
Admitted for heparin bridge from coumadin for scheduled penile prosthesis surgery to be performed by Dr. Shirley.  RCRI revealed class 4 risk with 15.0% 30 day risk of death, mi, cardiac arrest.  Plan:  -pre-op labs and imaging  -heparin bridge with nomogram  -manage co-morbidities   -cards consult for surgical clearance  -urology primary team

## 2024-02-17 NOTE — HPI
Isidro White Jr. is a 54 y.o. 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 Presents to the hospital as a direct admit for bridging of coumadin with heparin to have penile prosthesis surgery performed by Urology (Dr. Shirley) on 02/20/2024.  Patient receives HD on M/W/F and states he received his full dialysis session prior to arrival to the hospital.  States he does not produce urine. Reports last dose of Coumadin was yesterday.  Denies any complaints at this time.  Hospital Medicine consulted to manage comorbidities/medical problems and to manage heparin drip.    PCP: Boston Nevarez

## 2024-02-17 NOTE — PROGRESS NOTES
[facility] Telemetry ([facility] Medicine  Progress Note     Patient Name:  Isidro White Jr.  MRN:  [id number]  Patient Class: IP-Inpatient  Admission Date:  2/16/2024   Length of Stay:  1  Attending Physician: Shorty Reyes MD  Primary Care Provider: Boston Nevarez MD    Subjective:      Principal Problem: Preoperative clearance         HPI:  Isidro White Jr. is a 54 y.o. male with a PMH has a past medical history of Anemia, Aortic valve stenosis, Atrial fibrillation, Atrial flutter, Cardiomyopathy, CHF (congestive heart failure), Drug-induced erectile dysfunction, ESRD due to hypertension, ESRD on dialysis, GERD (gastroesophageal reflux disease), Hepatitis B, Hyperlipidemia, Hypertension, Nightmares, Obesity, DANIEL (obstructive sleep apnea) (11/12/2019), Secondary hyperparathyroidism of renal origin, Supraventricular tachycardia, Tachycardia, and Valvular regurgitation. Presents to the hospital as a direct admit for bridging of coumadin with heparin to have penile prosthesis surgery performed by Urology (Dr. Shirley) on 02/20/2024. Patient receives HD on M/W/F and states he received his full dialysis session prior to arrival to the hospital. States he does not produce urine. Reports last dose of Coumadin was yesterday. Denies any complaints at this time. Hospital Medicine consulted to manage comorbidities/medical problems and to manage heparin drip.      Overview/Hospital Course:  02/17/2024  INR down to 1.7 today. On chart review, seems that patient's OP coumadin clinic has successfully and vigilantly controlled his INR in the past. Agree with decision to bridge perioperatively. Will ask case management to set up prompt follow up with coumadin clinic once back in target range.      Interval Hx  No acute events overnight. Doing well this AM with no complaints at our encounter. Denies CP, SOB, Abdominal pain, fevers/chills.    Objective  /63   Pulse 85   Temp 98.6 °F (37 °C)   Resp  "18   Ht 6' 2" (1.88 m)   Wt 96.6 kg (212 lb 15.4 oz)   SpO2 97%   BMI 27.34 kg/m²   No intake or output data in the 24 hours ending 02/17/24 1523    PHYSICAL EXAM  Vitals and nursing note reviewed.   Constitutional:       General: He is awake. He is not in acute distress.     Appearance: Normal appearance. He is well-developed and well-groomed. He is not ill-appearing, toxic-appearing or diaphoretic.   HENT:      Head: Normocephalic and atraumatic.   Eyes:      Extraocular Movements: Extraocular movements intact.      Conjunctiva/sclera: Conjunctivae normal.   Cardiovascular:      Rate and Rhythm: Normal rate and regular rhythm.      Pulses: Normal pulses.      Heart sounds: Murmur heard.      Arteriovenous access: Left arteriovenous access is present.     Comments: +bruit and thrill noted to LLE.  Pulmonary:      Effort: Pulmonary effort is normal.      Breath sounds: Normal breath sounds. No decreased breath sounds, wheezing, rhonchi or rales.   Abdominal:      General: Bowel sounds are normal.      Palpations: Abdomen is soft.      Tenderness: There is no abdominal tenderness.   Musculoskeletal:      Cervical back: Normal range of motion and neck supple.      Right lower leg: No edema.      Left lower leg: No edema.      Comments: 5/5 strength throughout   Skin:     General: Skin is warm and dry.      Capillary Refill: Capillary refill takes less than 2 seconds.   Neurological:      General: No focal deficit present.      Mental Status: He is alert and oriented to person, place, and time. Mental status is at baseline.      GCS: GCS eye subscore is 4. GCS verbal subscore is 5. GCS motor subscore is 6.      Cranial Nerves: Cranial nerves 2-12 are intact.      Motor: Motor function is intact.   Psychiatric:         Mood and Affect: Mood normal.         Behavior: Behavior normal. Behavior is cooperative.        LABS  All labs from the past 24 hours were reviewed.     BMP:   Recent Labs   Lab 02/16/24 2123   GLU " "94      K 4.0   CL 97   CO2 26   BUN 42*   CREATININE 11.4*   CALCIUM 9.5     CBC:   Recent Labs   Lab 02/16/24 2123 02/17/24  0659   WBC 6.38 6.18   HGB 12.0* 11.3*   HCT 35.6* 34.3*    159     CMP:   Recent Labs   Lab 02/16/24 2123      K 4.0   CL 97   CO2 26   GLU 94   BUN 42*   CREATININE 11.4*   CALCIUM 9.5   ANIONGAP 19*     Cardiac Markers: No results for input(s): "CKMB", "MYOGLOBIN", "BNP", "TROPISTAT" in the last 48 hours.  Coagulation:   Recent Labs   Lab 02/16/24 2123 02/16/24 2354 02/17/24  1355   INR 1.7*  --   --    APTT 39.1*   < > 49.4*    < > = values in this interval not displayed.     Lactic Acid: No results for input(s): "LACTATE" in the last 48 hours.  Magnesium: No results for input(s): "MG" in the last 48 hours.  Troponin: No results for input(s): "TROPONINI", "TROPONINIHS" in the last 48 hours.  TSH:   No results for input(s): "TSH" in the last 4320 hours.  Urine Studies:   No results for input(s): "COLORU", "APPEARANCEUA", "PHUR", "SPECGRAV", "PROTEINUA", "GLUCUA", "KETONESU", "BILIRUBINUA", "OCCULTUA", "NITRITE", "UROBILINOGEN", "LEUKOCYTESUR", "RBCUA", "WBCUA", "BACTERIA", "SQUAMEPITHEL", "HYALINECASTS" in the last 48 hours.    Invalid input(s): "WRIGHTSUR"    IMAGING  All imaging from the past 24 hours were reviewed.         Assessment/Plan:    * Preoperative clearance  Admitted for heparin bridge from coumadin for scheduled penile prosthesis surgery to be performed by Dr. Shirley.  RCRI revealed class 4 risk with 15.0% 30 day risk of death, mi, cardiac arrest.  Plan:  -pre-op labs and imaging  -heparin bridge with nomogram  -manage co-morbidities   -cards consult for surgical clearance  -urology primary team       ESRD (end stage renal disease) on dialysis  Creatine stable for now. BMP reviewed- noted Estimated Creatinine Clearance: 8.7 mL/min (A) (based on SCr of 11.4 mg/dL (H)). according to latest data. Based on current GFR, CKD stage is end stage.  Monitor " "UOP and serial BMP and adjust therapy as needed. Renally dose meds. Avoid nephrotoxic medications and procedures. Nephrology consult for management of HD orders.     History of mechanical aortic valve replacement  Plan:  -continue anticoagulation therpay with heparin while in hospital  -tele monitoring  -cards consulted for surgical clearance        Chronic diastolic heart failure  Patient is identified as having Diastolic (HFpEF) heart failure that is Chronic. CHF is currently controlled. Latest ECHO performed and demonstrates- Results for orders placed during the hospital encounter of 04/27/23     Echo     Interpretation Summary  · The left ventricle is normal in size with concentric remodeling and low normal systolic function.  · Mild left atrial enlargement.  · Grade I left ventricular diastolic dysfunction.  · The estimated PA systolic pressure is 47 mmHg.  · Normal right ventricular size with normal right ventricular systolic function.  · There is pulmonary hypertension.  · Normal central venous pressure (3 mmHg).  · There is a bileaflet tilting disc mechanical aortic valve present. Prosthetic aortic valve is normal.  · The aortic valve mean gradient is 19 mmHg with a dimensionless index of 0.47.  · The estimated ejection fraction is 50%.  · The mean diastolic gradient across the mitral valve is 11 mmHg at a heart rate of 76 bpm.  · There is mild to moderate mitral stenosis.  · Mild to moderate tricuspid regurgitation.  . Continue Beta Blocker and monitor clinical status closely. Monitor on telemetry. Patient is off CHF pathway.  Monitor strict Is&Os and daily weights.  Place on fluid restriction of 1.5 L. Continue to stress to patient importance of self efficacy and  on diet for CHF. Last BNP reviewed- and noted below No results for input(s): "BNP", "BNPTRIAGEBLO" in the last 168 hours..                 Essential hypertension  Chronic, controlled. Latest blood pressure and vitals reviewed-      Temp:  " [98.1 °F (36.7 °C)]   Pulse:  [76-78]   Resp:  [18]   BP: (106)/(59)   SpO2:  [94 %] .   Home meds for hypertension were reviewed and noted below.   Hypertension Medications                    amLODIPine (NORVASC) 10 MG tablet Take 1 tablet by mouth daily     diltiaZEM (TIADYLT ER) 420 MG Cs24 Take 1 capsule (420 mg total) by mouth once daily.     hydrALAZINE (APRESOLINE) 100 MG tablet Take 1 tablet by mouth three times a day including dialysis days     labetaloL (NORMODYNE) 200 MG tablet Take 1 tablet (200 mg total) by mouth 3 (three) times daily.     lisinopriL (PRINIVIL,ZESTRIL) 40 MG tablet Take 1 tablet (40 mg total) by mouth once daily.     pep injection Inject 0.3 ml as directed      For compounding pharmacy use:   Add PAPAVERINE 30 mcg  Add PHENTOLAMINE 10 mg  Add ALPROSTADIL 100 mcg                While in the hospital, will manage blood pressure as follows; Continue home antihypertensive regimen     Will utilize p.r.n. blood pressure medication only if patient's blood pressure greater than 160/100 and he develops symptoms such as worsening chest pain or shortness of breath.     Atrial fibrillation  Patient with Paroxysmal (<7 days) atrial fibrillation which is controlled currently with Beta Blocker and Calcium Channel Blocker. Patient is currently in atrial fibrillation.[id number] Score: The patient doesn't have any registry metric data available. HASBLED Score: . Anticoagulation indicated. Anticoagulation done with coumadin, but bridging with heparin for scheduled surgical procedure .     Chronic hepatitis B  Patient has chronic liver disease due to hepatitis b. Their liver disease is compensated. Hepatology has not been consulted. We will obtain daily CBC, CMP, and INR.Will continue Tenofovir. Their most current Na-MELD is listed below.  MELD 3.0: 24 at 10/10/2023  9:27 AM  MELD-Na: 26 at 10/10/2023  9:27 AM  Calculated from:  Serum Creatinine: On dialysis. Using the maximum value.  Serum Sodium: 143  mmol/L (Using max of 137 mmol/L) at 10/10/2023  8:31 AM  Total Bilirubin: 0.5 mg/dL (Using min of 1 mg/dL) at 10/10/2023  8:31 AM  Serum Albumin: 3.3 g/dL at 10/10/2023  8:31 AM  INR(ratio): 1.8 at 10/10/2023  9:27 AM  Age at listing (hypothetical): 53 years  Sex: Male at 10/10/2023  9:27 AM           GERD (gastroesophageal reflux disease)  Chronic. Stable. Currently asymptomatic. Home medications include PPI/Antacids as needed.  Plan:  -Continue PPI/Antacids as needed            Hypertriglyceridemia  Patient is not chronically on statin.will not continue for now. Last Lipid Panel:         Lab Results   Component Value Date     CHOL 248 (H) 11/05/2020     HDL 42 11/05/2020     LDLCALC 164.8 (H) 11/05/2020     TRIG 206 (H) 11/05/2020     CHOLHDL 16.9 (L) 11/05/2020      Plan:  -Continue home medication  -low fat/low calorie diet           CORE MEASURES:  VTE Risk Mitigation (From admission, onward)           Ordered     heparin 25,000 units in dextrose 5% (100 units/ml) IV bolus from bag LOW INTENSITY nomogram - [facility]  As needed (PRN)        Question:  Heparin Infusion Adjustment (DO NOT MODIFY ANSWER)  Answer:  \\[email or web address]\epic\Images\Pharmacy\HeparinInfusions\heparin LOW INTENSITY nomogram for [facility] [id number].pdf    02/16/24 2319     heparin 25,000 units in dextrose 5% (100 units/ml) IV bolus from bag LOW INTENSITY nomogram - [facility]  As needed (PRN)        Question:  Heparin Infusion Adjustment (DO NOT MODIFY ANSWER)  Answer:  \[email or web address]\epic\Images\Pharmacy\HeparinInfusions\heparin LOW INTENSITY nomogram for [facility] [id number].pdf    02/16/24 2319     heparin 25,000 units in dextrose 5% 250 mL (100 units/mL) infusion LOW INTENSITY nomogram - [facility]  Continuous        Question:  Begin at (units/kg/hr)  Answer:  12    02/16/24 2319     Reason for No Pharmacological VTE Prophylaxis  Once        Comments: Pending CoAg levels for heparin bridge   Question:  Reasons:  Answer:  Physician Provided (leave comment)    02/16/24 0811      Place FRANDY hose  Until discontinued         02/16/24 2018     IP VTE HIGH RISK PATIENT  Once         02/16/24 2018     Place sequential compression device  Until discontinued         02/16/24 2018                    Code Status: FULL     Diet: Diet Cardiac Standard Tray  Diet NPO    Disposition: pending urological operation       Shorty Reyes MD  Department of Hospital Medicine   '[facility] - Telemetry ([facility])

## 2024-02-18 LAB
APTT PPP: 41.9 SEC (ref 21–32)
APTT PPP: 48.4 SEC (ref 21–32)
BASOPHILS # BLD AUTO: 0.02 K/UL (ref 0–0.2)
BASOPHILS # BLD AUTO: 0.02 K/UL (ref 0–0.2)
BASOPHILS NFR BLD: 0.3 % (ref 0–1.9)
BASOPHILS NFR BLD: 0.3 % (ref 0–1.9)
DIFFERENTIAL METHOD BLD: ABNORMAL
DIFFERENTIAL METHOD BLD: ABNORMAL
EOSINOPHIL # BLD AUTO: 0.3 K/UL (ref 0–0.5)
EOSINOPHIL # BLD AUTO: 0.3 K/UL (ref 0–0.5)
EOSINOPHIL NFR BLD: 4.1 % (ref 0–8)
EOSINOPHIL NFR BLD: 4.3 % (ref 0–8)
ERYTHROCYTE [DISTWIDTH] IN BLOOD BY AUTOMATED COUNT: 15.2 % (ref 11.5–14.5)
ERYTHROCYTE [DISTWIDTH] IN BLOOD BY AUTOMATED COUNT: 15.4 % (ref 11.5–14.5)
HCT VFR BLD AUTO: 34.5 % (ref 40–54)
HCT VFR BLD AUTO: 36 % (ref 40–54)
HGB BLD-MCNC: 11.6 G/DL (ref 14–18)
HGB BLD-MCNC: 12 G/DL (ref 14–18)
IMM GRANULOCYTES # BLD AUTO: 0.01 K/UL (ref 0–0.04)
IMM GRANULOCYTES # BLD AUTO: 0.01 K/UL (ref 0–0.04)
IMM GRANULOCYTES NFR BLD AUTO: 0.1 % (ref 0–0.5)
IMM GRANULOCYTES NFR BLD AUTO: 0.1 % (ref 0–0.5)
INR PPP: 1.3 (ref 0.8–1.2)
LYMPHOCYTES # BLD AUTO: 1.5 K/UL (ref 1–4.8)
LYMPHOCYTES # BLD AUTO: 1.5 K/UL (ref 1–4.8)
LYMPHOCYTES NFR BLD: 20 % (ref 18–48)
LYMPHOCYTES NFR BLD: 22.4 % (ref 18–48)
MCH RBC QN AUTO: 33.6 PG (ref 27–31)
MCH RBC QN AUTO: 33.8 PG (ref 27–31)
MCHC RBC AUTO-ENTMCNC: 33.3 G/DL (ref 32–36)
MCHC RBC AUTO-ENTMCNC: 33.6 G/DL (ref 32–36)
MCV RBC AUTO: 100 FL (ref 82–98)
MCV RBC AUTO: 101 FL (ref 82–98)
MONOCYTES # BLD AUTO: 0.8 K/UL (ref 0.3–1)
MONOCYTES # BLD AUTO: 0.8 K/UL (ref 0.3–1)
MONOCYTES NFR BLD: 10.9 % (ref 4–15)
MONOCYTES NFR BLD: 11.3 % (ref 4–15)
NEUTROPHILS # BLD AUTO: 4.2 K/UL (ref 1.8–7.7)
NEUTROPHILS # BLD AUTO: 4.8 K/UL (ref 1.8–7.7)
NEUTROPHILS NFR BLD: 61.8 % (ref 38–73)
NEUTROPHILS NFR BLD: 64.4 % (ref 38–73)
NRBC BLD-RTO: 0 /100 WBC
NRBC BLD-RTO: 0 /100 WBC
PLATELET # BLD AUTO: 149 K/UL (ref 150–450)
PLATELET # BLD AUTO: 166 K/UL (ref 150–450)
PMV BLD AUTO: 10.4 FL (ref 9.2–12.9)
PMV BLD AUTO: 10.7 FL (ref 9.2–12.9)
PROTHROMBIN TIME: 15.1 SEC (ref 9–12.5)
RBC # BLD AUTO: 3.45 M/UL (ref 4.6–6.2)
RBC # BLD AUTO: 3.55 M/UL (ref 4.6–6.2)
WBC # BLD AUTO: 6.75 K/UL (ref 3.9–12.7)
WBC # BLD AUTO: 7.49 K/UL (ref 3.9–12.7)

## 2024-02-18 PROCEDURE — 25000003 PHARM REV CODE 250: Performed by: NURSE PRACTITIONER

## 2024-02-18 PROCEDURE — 86706 HEP B SURFACE ANTIBODY: CPT | Performed by: UROLOGY

## 2024-02-18 PROCEDURE — 63600175 PHARM REV CODE 636 W HCPCS: Performed by: NURSE PRACTITIONER

## 2024-02-18 PROCEDURE — 94761 N-INVAS EAR/PLS OXIMETRY MLT: CPT

## 2024-02-18 PROCEDURE — 85610 PROTHROMBIN TIME: CPT | Performed by: STUDENT IN AN ORGANIZED HEALTH CARE EDUCATION/TRAINING PROGRAM

## 2024-02-18 PROCEDURE — 80074 ACUTE HEPATITIS PANEL: CPT | Performed by: UROLOGY

## 2024-02-18 PROCEDURE — 21400001 HC TELEMETRY ROOM

## 2024-02-18 PROCEDURE — 85730 THROMBOPLASTIN TIME PARTIAL: CPT | Performed by: NURSE PRACTITIONER

## 2024-02-18 PROCEDURE — 36415 COLL VENOUS BLD VENIPUNCTURE: CPT | Mod: XB | Performed by: UROLOGY

## 2024-02-18 PROCEDURE — 5A1D70Z PERFORMANCE OF URINARY FILTRATION, INTERMITTENT, LESS THAN 6 HOURS PER DAY: ICD-10-PCS | Performed by: UROLOGY

## 2024-02-18 PROCEDURE — 99232 SBSQ HOSP IP/OBS MODERATE 35: CPT | Mod: ,,, | Performed by: INTERNAL MEDICINE

## 2024-02-18 PROCEDURE — 99232 SBSQ HOSP IP/OBS MODERATE 35: CPT | Mod: ,,, | Performed by: UROLOGY

## 2024-02-18 PROCEDURE — 36415 COLL VENOUS BLD VENIPUNCTURE: CPT | Performed by: NURSE PRACTITIONER

## 2024-02-18 PROCEDURE — 85730 THROMBOPLASTIN TIME PARTIAL: CPT | Mod: 91 | Performed by: STUDENT IN AN ORGANIZED HEALTH CARE EDUCATION/TRAINING PROGRAM

## 2024-02-18 PROCEDURE — 36415 COLL VENOUS BLD VENIPUNCTURE: CPT | Mod: XB | Performed by: STUDENT IN AN ORGANIZED HEALTH CARE EDUCATION/TRAINING PROGRAM

## 2024-02-18 PROCEDURE — 25000003 PHARM REV CODE 250: Performed by: UROLOGY

## 2024-02-18 PROCEDURE — 85025 COMPLETE CBC W/AUTO DIFF WBC: CPT | Performed by: NURSE PRACTITIONER

## 2024-02-18 PROCEDURE — 85025 COMPLETE CBC W/AUTO DIFF WBC: CPT | Mod: 91 | Performed by: STUDENT IN AN ORGANIZED HEALTH CARE EDUCATION/TRAINING PROGRAM

## 2024-02-18 PROCEDURE — 87341 HEP B SURFACE AG NEUTRLZJ IA: CPT | Performed by: UROLOGY

## 2024-02-18 RX ADMIN — ALUMINUM HYDROXIDE, MAGNESIUM HYDROXIDE, AND DIMETHICONE 30 ML: 200; 20; 200 SUSPENSION ORAL at 09:02

## 2024-02-18 RX ADMIN — LABETALOL HYDROCHLORIDE 200 MG: 200 TABLET, FILM COATED ORAL at 09:02

## 2024-02-18 RX ADMIN — HEPARIN SODIUM 12 UNITS/KG/HR: 10000 INJECTION, SOLUTION INTRAVENOUS at 04:02

## 2024-02-18 RX ADMIN — CALCIUM ACETATE 2001 MG: 667 CAPSULE ORAL at 04:02

## 2024-02-18 RX ADMIN — LABETALOL HYDROCHLORIDE 200 MG: 200 TABLET, FILM COATED ORAL at 08:02

## 2024-02-18 RX ADMIN — PANTOPRAZOLE SODIUM 40 MG: 40 TABLET, DELAYED RELEASE ORAL at 08:02

## 2024-02-18 RX ADMIN — POLYETHYLENE GLYCOL 3350 17 G: 17 POWDER, FOR SOLUTION ORAL at 09:02

## 2024-02-18 RX ADMIN — CALCIUM ACETATE 2001 MG: 667 CAPSULE ORAL at 12:02

## 2024-02-18 RX ADMIN — NEPHROCAP 1 CAPSULE: 1 CAP ORAL at 08:02

## 2024-02-18 RX ADMIN — DILTIAZEM HYDROCHLORIDE 420 MG: 240 CAPSULE, COATED, EXTENDED RELEASE ORAL at 08:02

## 2024-02-18 RX ADMIN — LABETALOL HYDROCHLORIDE 200 MG: 200 TABLET, FILM COATED ORAL at 04:02

## 2024-02-18 RX ADMIN — HYDRALAZINE HYDROCHLORIDE 100 MG: 50 TABLET ORAL at 09:02

## 2024-02-18 RX ADMIN — CALCIUM ACETATE 2001 MG: 667 CAPSULE ORAL at 08:02

## 2024-02-18 NOTE — PROGRESS NOTES
[facility] - Telemetry ([facility])  Cardiology  Progress Note    Patient Name: Isidro White Jr.  MRN: [id number]  Admission Date: 2/16/2024  Hospital Length of Stay: 2 days  Code Status: Full Code   Attending Physician: Bonifacio Shirley MD   Primary Care Physician: Boston Nevarez MD  Expected Discharge Date:   Principal Problem:Preoperative clearance    Subjective:     Hospital Course:   2/18/24- Surgery planned for Tuesday, currently bridging IV heparin , while holding coumadin. Pt without any complaints. BP is stable.         Review of Systems   Constitutional: Negative. Negative for weight gain.   HENT: Negative.     Eyes: Negative.    Cardiovascular: Negative.  Negative for chest pain, leg swelling and palpitations.   Respiratory: Negative.  Negative for shortness of breath.    Endocrine: Negative.    Hematologic/Lymphatic: Negative.    Skin: Negative.    Musculoskeletal:  Negative for muscle weakness.   Gastrointestinal: Negative.    Genitourinary: Negative.    Neurological: Negative.  Negative for dizziness.   Psychiatric/Behavioral: Negative.     Allergic/Immunologic: Negative.    All other systems reviewed and are negative.    Objective:     Vital Signs (Most Recent):  Temp: 97.5 °F (36.4 °C) (02/18/24 1115)  Pulse: 75 (02/18/24 1115)  Resp: 14 (02/18/24 1115)  BP: (Abnormal) 123/56 (02/18/24 1115)  SpO2: 95 % (02/18/24 1115) Vital Signs (24h Range):  Temp:  [97.5 °F (36.4 °C)-98.6 °F (37 °C)] 97.5 °F (36.4 °C)  Pulse:  [75-88] 75  Resp:  [14-18] 14  SpO2:  [93 %-97 %] 95 %  BP: (105-125)/(56-65) 123/56     Weight: 96.6 kg (212 lb 15.4 oz)  Body mass index is 27.34 kg/m².     SpO2: 95 %         Intake/Output Summary (Last 24 hours) at 2/18/2024 1127  Last data filed at 2/17/2024 1656  Gross per 24 hour   Intake 1098.87 ml   Output no documentation   Net 1098.87 ml       Lines/Drains/Airways       Central Venous Catheter Line       Name Duration         Hemodialysis Catheter 07/02/20 right internal  "jugular 1326 days         Hemodialysis AV Graft 07/04/20 0701 Left upper arm 1324 days              Peripheral Intravenous Line       Name Duration         Peripheral IV - Single Lumen 02/16/24 0953 20 G Anterior;Right Forearm 2 days                       Physical Exam  Vitals and nursing note reviewed.   Constitutional:       Appearance: He is well-developed.   HENT:      Head: Normocephalic and atraumatic.   Eyes:      Conjunctiva/sclera: Conjunctivae normal.      Pupils: Pupils are equal, round, and reactive to light.   Cardiovascular:      Rate and Rhythm: Normal rate and regular rhythm.      Pulses: Intact distal pulses.      Heart sounds: Normal heart sounds.   Pulmonary:      Effort: Pulmonary effort is normal.      Breath sounds: Normal breath sounds.   Abdominal:      General: Bowel sounds are normal.      Palpations: Abdomen is soft.   Musculoskeletal:      Cervical back: Normal range of motion and neck supple.   Skin:     General: Skin is warm and dry.   Neurological:      Mental Status: He is alert and oriented to person, place, and time.            Significant Labs: BMP:   Recent Labs   Lab 02/16/24 2123   GLU 94      K 4.0   CL 97   CO2 26   BUN 42*   CREATININE 11.4*   CALCIUM 9.5   , CMP   Recent Labs   Lab 02/16/24 2123      K 4.0   CL 97   CO2 26   GLU 94   BUN 42*   CREATININE 11.4*   CALCIUM 9.5   ANIONGAP 19*   , CBC   Recent Labs   Lab 02/16/24 2123 02/17/24  0659 02/18/24  0523   WBC 6.38 6.18 6.75   HGB 12.0* 11.3* 11.6*   HCT 35.6* 34.3* 34.5*    159 149*   , Troponin No results for input(s): "TROPONINI" in the last 48 hours., All pertinent lab results from the last 24 hours have been reviewed., and   Recent Lab Results         02/18/24  0523   02/17/24  1355        PTT 48.4  Comment: Refer to local heparin nomogram for intensity/dose specific   therapeutic   range.     49.4  Comment: Refer to local heparin nomogram for intensity/dose specific   therapeutic   range.     "     Baso # 0.02         Basophil % 0.3         Differential Method Automated         Eos # 0.3         Eos % 4.1         Gran # (ANC) 4.2         Gran % 61.8         Hematocrit 34.5         Hemoglobin 11.6         Immature Grans (Abs) 0.01  Comment: Mild elevation in immature granulocytes is non specific and   can be seen in a variety of conditions including stress response,   acute inflammation, trauma and pregnancy. Correlation with other   laboratory and clinical findings is essential.           Immature Granulocytes 0.1         Lymph # 1.5         Lymph % 22.4         MCH 33.6         MCHC 33.6                  Mono # 0.8         Mono % 11.3         MPV 10.7         nRBC 0         Platelet Count 149         RBC 3.45         RDW 15.4         WBC 6.75                 Significant Imaging: Echocardiogram: 2D echo with color flow doppler: No results found. However, due to the size of the patient record, not all encounters were searched. Please check Results Review for a complete set of results. and Transthoracic echo (TTE) complete ([facility] Only):   Results for orders placed or performed during the hospital encounter of 04/27/23   Echo   Result Value Ref Range    BSA 2.24 m2    LV LATERAL E/E' RATIO 19.50 m/s    LA WIDTH 3.50 cm    Left Ventricular Outflow Tract Mean Velocity 1.01 cm/s    Left Ventricular Outflow Tract Mean Gradient 4.38 mmHg    Pulmonary Valve Mean Velocity 0.77 m/s    TDI LATERAL 0.08 m/s    PV PEAK VELOCITY 1.25 cm/s    LVIDd 4.57 3.5 - 6.0 cm    IVS 1.45 (A) 0.6 - 1.1 cm    Posterior Wall 1.25 (A) 0.6 - 1.1 cm    Ao root annulus 3.89 cm    LVIDs 3.52 2.1 - 4.0 cm    FS 23 28 - 44 %    LA volume 81.61 cm3    Sinus 2.73 cm    STJ 2.84 cm    LV mass 240.88 g    LA size 4.53 cm    RVDD 2.69 cm    Left Ventricle Relative Wall Thickness 0.55 cm    AV mean gradient 19 mmHg    AV valve area 1.50 cm2    AV Velocity Ratio 0.44     AV index (prosthetic) 0.47     MV mean gradient 11 mmHg    MV valve  area p 1/2 method 3.52 cm2    MV valve area by continuity eq 1.18 cm2    E/A ratio 0.79     E wave deceleration time 181.51 msec    LVOT diameter 2.02 cm    LVOT area 3.2 cm2    LVOT peak nikita 1.33 m/s    LVOT peak VTI 27.70 cm    Ao peak nikita 3.04 m/s    Ao VTI 59.3 cm    RVOT peak nikita 0.79 m/s    RVOT peak VTI 17.4 cm    LVOT stroke volume 88.73 cm3    AV peak gradient 37 mmHg    MV peak gradient 18 mmHg    PV mean gradient 1.15 mmHg    MV Peak E Nikita 1.56 m/s    TR Max Nikita 3.33 m/s    MV VTI 75.5 cm    MV stenosis pressure 1/2 time 62.46 ms    MV Peak A Nikita 1.97 m/s    LV Systolic Volume 51.59 mL    LV Systolic Volume Index 23.1 mL/m2    LV Diastolic Volume 96.00 mL    LV Diastolic Volume Index 43.05 mL/m2    LA Volume Index 36.6 mL/m2    LV Mass Index 108 g/m2    RA Major Axis 4.46 cm    Left Atrium Minor Axis 6.42 cm    Left Atrium Major Axis 5.73 cm    Triscuspid Valve Regurgitation Peak Gradient 44 mmHg    LA Volume Index (Mod) 41.0 mL/m2    LA volume (mod) 91.32 cm3    RA Width 3.59 cm    Right Atrial Pressure (from IVC) 3 mmHg    EF 50 %    TV resting pulmonary artery pressure 47 mmHg    Narrative    · The left ventricle is normal in size with concentric remodeling and low   normal systolic function.  · Mild left atrial enlargement.  · Grade I left ventricular diastolic dysfunction.  · The estimated PA systolic pressure is 47 mmHg.  · Normal right ventricular size with normal right ventricular systolic   function.  · There is pulmonary hypertension.  · Normal central venous pressure (3 mmHg).  · There is a bileaflet tilting disc mechanical aortic valve present.   Prosthetic aortic valve is normal.  · The aortic valve mean gradient is 19 mmHg with a dimensionless index of   0.47.  · The estimated ejection fraction is 50%.  · The mean diastolic gradient across the mitral valve is 11 mmHg at a   heart rate of 76 bpm.  · There is mild to moderate mitral stenosis.  · Mild to moderate tricuspid regurgitation.        and EKG:   Assessment and Plan:     Brief HPI:     History of mechanical aortic valve replacement   54 y.o. male pt with PAF, ASD closure at age 8 ([facility]), HFpEF, s/p AVR with a 22 mm mechanical valve and TV annuloplasty with a 28 mm ring 3/17, HTN, PHTN, Mitral stenosis, SVT, EP performed RFA (3/13/17) , ESRD presents to the hospital as a direct admit for bridging of coumadin with heparin to have penile prosthesis surgery performed by Urology (Dr. Shirley) on 02/20/2024. Patient denies CP, angina or anginal equivalent.    Post surgery heparin and coumadin to start once ok with surgeon.        VTE Risk Mitigation (From admission, onward)           Ordered     heparin 25,000 units in dextrose 5% (100 units/ml) IV bolus from bag LOW INTENSITY nomogram - [facility]  As needed (PRN)        Question:  Heparin Infusion Adjustment (DO NOT MODIFY ANSWER)  Answer:  \\[email or web address]\epic\Images\Pharmacy\HeparinInfusions\heparin LOW INTENSITY nomogram for [facility] [id number].pdf    02/16/24 2319     heparin 25,000 units in dextrose 5% (100 units/ml) IV bolus from bag LOW INTENSITY nomogram - [facility]  As needed (PRN)        Question:  Heparin Infusion Adjustment (DO NOT MODIFY ANSWER)  Answer:  \\[email or web address]\epic\Images\Pharmacy\HeparinInfusions\heparin LOW INTENSITY nomogram for [facility] [id number].pdf    02/16/24 2319     heparin 25,000 units in dextrose 5% 250 mL (100 units/mL) infusion LOW INTENSITY nomogram - [facility]  Continuous        Question:  Begin at (units/kg/hr)  Answer:  12    02/16/24 2319     Reason for No Pharmacological VTE Prophylaxis  Once        Comments: Pending CoAg levels for heparin bridge   Question:  Reasons:  Answer:  Physician Provided (leave comment)    02/16/24 2116     Place FRANDY hose  Until discontinued         02/16/24 2018     IP VTE HIGH RISK PATIENT  Once         02/16/24 2018     Place sequential compression device  Until discontinued         02/16/24 2018                    Shai Calderon,  MD  Cardiology  [facility] - Telemetry ([facility])

## 2024-02-18 NOTE — SUBJECTIVE & OBJECTIVE
Review of Systems   Constitutional: Negative. Negative for weight gain.   HENT: Negative.     Eyes: Negative.    Cardiovascular: Negative.  Negative for chest pain, leg swelling and palpitations.   Respiratory: Negative.  Negative for shortness of breath.    Endocrine: Negative.    Hematologic/Lymphatic: Negative.    Skin: Negative.    Musculoskeletal:  Negative for muscle weakness.   Gastrointestinal: Negative.    Genitourinary: Negative.    Neurological: Negative.  Negative for dizziness.   Psychiatric/Behavioral: Negative.     Allergic/Immunologic: Negative.    All other systems reviewed and are negative.    Objective:     Vital Signs (Most Recent):  Temp: 97.5 °F (36.4 °C) (02/18/24 1115)  Pulse: 75 (02/18/24 1115)  Resp: 14 (02/18/24 1115)  BP: (Abnormal) 123/56 (02/18/24 1115)  SpO2: 95 % (02/18/24 1115) Vital Signs (24h Range):  Temp:  [97.5 °F (36.4 °C)-98.6 °F (37 °C)] 97.5 °F (36.4 °C)  Pulse:  [75-88] 75  Resp:  [14-18] 14  SpO2:  [93 %-97 %] 95 %  BP: (105-125)/(56-65) 123/56     Weight: 96.6 kg (212 lb 15.4 oz)  Body mass index is 27.34 kg/m².     SpO2: 95 %         Intake/Output Summary (Last 24 hours) at 2/18/2024 1127  Last data filed at 2/17/2024 1656  Gross per 24 hour   Intake 1098.87 ml   Output no documentation   Net 1098.87 ml       Lines/Drains/Airways       Central Venous Catheter Line       Name Duration         Hemodialysis Catheter 07/02/20 right internal jugular 1326 days         Hemodialysis AV Graft 07/04/20 0701 Left upper arm 1324 days              Peripheral Intravenous Line       Name Duration         Peripheral IV - Single Lumen 02/16/24 0953 20 G Anterior;Right Forearm 2 days                       Physical Exam  Vitals and nursing note reviewed.   Constitutional:       Appearance: He is well-developed.   HENT:      Head: Normocephalic and atraumatic.   Eyes:      Conjunctiva/sclera: Conjunctivae normal.      Pupils: Pupils are equal, round, and reactive to light.  "  Cardiovascular:      Rate and Rhythm: Normal rate and regular rhythm.      Pulses: Intact distal pulses.      Heart sounds: Normal heart sounds.   Pulmonary:      Effort: Pulmonary effort is normal.      Breath sounds: Normal breath sounds.   Abdominal:      General: Bowel sounds are normal.      Palpations: Abdomen is soft.   Musculoskeletal:      Cervical back: Normal range of motion and neck supple.   Skin:     General: Skin is warm and dry.   Neurological:      Mental Status: He is alert and oriented to person, place, and time.            Significant Labs: BMP:   Recent Labs   Lab 02/16/24 2123   GLU 94      K 4.0   CL 97   CO2 26   BUN 42*   CREATININE 11.4*   CALCIUM 9.5   , CMP   Recent Labs   Lab 02/16/24 2123      K 4.0   CL 97   CO2 26   GLU 94   BUN 42*   CREATININE 11.4*   CALCIUM 9.5   ANIONGAP 19*   , CBC   Recent Labs   Lab 02/16/24 2123 02/17/24  0659 02/18/24  0523   WBC 6.38 6.18 6.75   HGB 12.0* 11.3* 11.6*   HCT 35.6* 34.3* 34.5*    159 149*   , Troponin No results for input(s): "TROPONINI" in the last 48 hours., All pertinent lab results from the last 24 hours have been reviewed., and   Recent Lab Results         02/18/24  0523   02/17/24  1355        PTT 48.4  Comment: Refer to local heparin nomogram for intensity/dose specific   therapeutic   range.     49.4  Comment: Refer to local heparin nomogram for intensity/dose specific   therapeutic   range.         Baso # 0.02         Basophil % 0.3         Differential Method Automated         Eos # 0.3         Eos % 4.1         Gran # (ANC) 4.2         Gran % 61.8         Hematocrit 34.5         Hemoglobin 11.6         Immature Grans (Abs) 0.01  Comment: Mild elevation in immature granulocytes is non specific and   can be seen in a variety of conditions including stress response,   acute inflammation, trauma and pregnancy. Correlation with other   laboratory and clinical findings is essential.           Immature Granulocytes " 0.1         Lymph # 1.5         Lymph % 22.4         MCH 33.6         MCHC 33.6                  Mono # 0.8         Mono % 11.3         MPV 10.7         nRBC 0         Platelet Count 149         RBC 3.45         RDW 15.4         WBC 6.75                 Significant Imaging: Echocardiogram: 2D echo with color flow doppler: No results found. However, due to the size of the patient record, not all encounters were searched. Please check Results Review for a complete set of results. and Transthoracic echo (TTE) complete (Cupid Only):   Results for orders placed or performed during the hospital encounter of 04/27/23   Echo   Result Value Ref Range    BSA 2.24 m2    LV LATERAL E/E' RATIO 19.50 m/s    LA WIDTH 3.50 cm    Left Ventricular Outflow Tract Mean Velocity 1.01 cm/s    Left Ventricular Outflow Tract Mean Gradient 4.38 mmHg    Pulmonary Valve Mean Velocity 0.77 m/s    TDI LATERAL 0.08 m/s    PV PEAK VELOCITY 1.25 cm/s    LVIDd 4.57 3.5 - 6.0 cm    IVS 1.45 (A) 0.6 - 1.1 cm    Posterior Wall 1.25 (A) 0.6 - 1.1 cm    Ao root annulus 3.89 cm    LVIDs 3.52 2.1 - 4.0 cm    FS 23 28 - 44 %    LA volume 81.61 cm3    Sinus 2.73 cm    STJ 2.84 cm    LV mass 240.88 g    LA size 4.53 cm    RVDD 2.69 cm    Left Ventricle Relative Wall Thickness 0.55 cm    AV mean gradient 19 mmHg    AV valve area 1.50 cm2    AV Velocity Ratio 0.44     AV index (prosthetic) 0.47     MV mean gradient 11 mmHg    MV valve area p 1/2 method 3.52 cm2    MV valve area by continuity eq 1.18 cm2    E/A ratio 0.79     E wave deceleration time 181.51 msec    LVOT diameter 2.02 cm    LVOT area 3.2 cm2    LVOT peak nikita 1.33 m/s    LVOT peak VTI 27.70 cm    Ao peak nikita 3.04 m/s    Ao VTI 59.3 cm    RVOT peak nikita 0.79 m/s    RVOT peak VTI 17.4 cm    LVOT stroke volume 88.73 cm3    AV peak gradient 37 mmHg    MV peak gradient 18 mmHg    PV mean gradient 1.15 mmHg    MV Peak E Nikita 1.56 m/s    TR Max Nikita 3.33 m/s    MV VTI 75.5 cm    MV stenosis pressure  1/2 time 62.46 ms    MV Peak A Nikita 1.97 m/s    LV Systolic Volume 51.59 mL    LV Systolic Volume Index 23.1 mL/m2    LV Diastolic Volume 96.00 mL    LV Diastolic Volume Index 43.05 mL/m2    LA Volume Index 36.6 mL/m2    LV Mass Index 108 g/m2    RA Major Axis 4.46 cm    Left Atrium Minor Axis 6.42 cm    Left Atrium Major Axis 5.73 cm    Triscuspid Valve Regurgitation Peak Gradient 44 mmHg    LA Volume Index (Mod) 41.0 mL/m2    LA volume (mod) 91.32 cm3    RA Width 3.59 cm    Right Atrial Pressure (from IVC) 3 mmHg    EF 50 %    TV resting pulmonary artery pressure 47 mmHg    Narrative    · The left ventricle is normal in size with concentric remodeling and low   normal systolic function.  · Mild left atrial enlargement.  · Grade I left ventricular diastolic dysfunction.  · The estimated PA systolic pressure is 47 mmHg.  · Normal right ventricular size with normal right ventricular systolic   function.  · There is pulmonary hypertension.  · Normal central venous pressure (3 mmHg).  · There is a bileaflet tilting disc mechanical aortic valve present.   Prosthetic aortic valve is normal.  · The aortic valve mean gradient is 19 mmHg with a dimensionless index of   0.47.  · The estimated ejection fraction is 50%.  · The mean diastolic gradient across the mitral valve is 11 mmHg at a   heart rate of 76 bpm.  · There is mild to moderate mitral stenosis.  · Mild to moderate tricuspid regurgitation.       and EKG:

## 2024-02-18 NOTE — CONSULTS
[facility] - [facility])  Nephrology  Consult Note    Patient Name: Isidro White Jr.  MRN: [id number]  Admission Date: 2/16/2024  Hospital Length of Stay: 1 days  Attending Provider: Bonifacio Shirley MD   Primary Care Physician: Boston Nevarez MD  Principal Problem:Preoperative clearance  Reason for Consult: Management of ESRD  Primary Nephrologist: Dr. Hi     Inpatient consult to Nephrology  Consult performed by: David Garcia MD  Consult ordered by: Bonifacio Shirley MD  Reason for consult: Management of ESRD      Subjective:     HPI: 55 yo male with ESRD MWF Davfrancisco Venegas admitted for heparin anticoagulation bridge for elective Urologic procedure, penile implant.  He dialyzes via LUE AVF with vintage almost 7 years. Last dialysis was yesterday and without event.No routine cramping and BP rarely drops.       Past Medical History:   Diagnosis Date    Anemia     Aortic valve stenosis     s/p mechanical AVR    Atrial fibrillation     Atrial flutter     Cardiomyopathy     CHF (congestive heart failure)     Drug-induced erectile dysfunction     ESRD due to hypertension     HD M, W, F    ESRD on dialysis     GERD (gastroesophageal reflux disease)     Hepatitis B     Hyperlipidemia     Hypertension     Nightmares     Obesity     DANIEL (obstructive sleep apnea) 11/12/2019    Secondary hyperparathyroidism of renal origin     Supraventricular tachycardia     atrial tachycardia    Tachycardia     Valvular regurgitation     AI, TR       Past Surgical History:   Procedure Laterality Date    ASD REPAIR      age 8 [facility]    AV Graft Creation Left 03/2017    CARDIAC CATHETERIZATION      [facility]     CARDIAC VALVE SURGERY  02/08/2017    22 mm Medtronic AV, 28 mm TV Medtronic annuloplaty ring    COLONOSCOPY N/A 8/27/2019    Procedure: COLONOSCOPY;  Surgeon: Addie John MD;  Location: [facility];  Service: Endoscopy;  Laterality: N/A;  dialysis pt *needs K*    COLONOSCOPY N/A 9/3/2020     Procedure: COLONOSCOPY-need INR;  Surgeon: Jemma Youngblood MD;  Location: [facility] ENDO;  Service: Endoscopy;  Laterality: N/A;    ESOPHAGOGASTRODUODENOSCOPY N/A 8/27/2019    Procedure: EGD (ESOPHAGOGASTRODUODENOSCOPY);  Surgeon: Addie John MD;  Location: [facility] ENDO;  Service: Endoscopy;  Laterality: N/A;    RADIOFREQUENCY ABLATION  03/13/2017    Atrial tachycardia    REMOVAL OF GRAFT Left 7/2/2020    Procedure: REMOVAL, GRAFT;  Surgeon: Sascha Sidhu MD;  Location: [facility] OR;  Service: Peripheral Vascular;  Laterality: Left;    RIGHT HEART CATHETERIZATION Right 8/12/2021    Procedure: INSERTION, CATHETER, RIGHT HEART;  Surgeon: Mariah Peirce MD;  Location: [facility] CATH LAB;  Service: Cardiology;  Laterality: Right;       Review of patient's allergies indicates:  No Known Allergies  Current Facility-Administered Medications   Medication Frequency    acetaminophen suppository 650 mg Q4H PRN    acetaminophen tablet 650 mg Q6H PRN    aluminum-magnesium hydroxide-simethicone 200-200-20 mg/5 mL suspension 30 mL QID (AC & HS)    amLODIPine tablet 10 mg Daily    calcium acetate(phosphat bind) capsule 2,001 mg TID WM    dextrose 10% bolus 125 mL 125 mL PRN    dextrose 10% bolus 250 mL 250 mL PRN    diltiaZEM 24 hr capsule 420 mg Daily    glucagon (human recombinant) injection 1 mg PRN    glucose chewable tablet 16 g PRN    glucose chewable tablet 24 g PRN    heparin 25,000 units in dextrose 5% (100 units/ml) IV bolus from bag LOW INTENSITY nomogram - [facility] PRN    heparin 25,000 units in dextrose 5% (100 units/ml) IV bolus from bag LOW INTENSITY nomogram - [facility] PRN    heparin 25,000 units in dextrose 5% 250 mL (100 units/mL) infusion LOW INTENSITY nomogram - [facility] Continuous    hydrALAZINE tablet 100 mg TID    hydrOXYzine HCL tablet 25 mg Daily PRN    labetaloL tablet 200 mg TID    lisinopriL tablet 40 mg Daily    melatonin tablet 6 mg Nightly PRN    multivitamin with folic acid 400 mcg Tab 1 tablet Daily    naloxone 0.4 mg/mL  injection 0.02 mg PRN    ondansetron injection 4 mg Q8H PRN    pantoprazole EC tablet 40 mg Daily    polyethylene glycol packet 17 g Daily PRN    promethazine tablet 25 mg Q6H PRN    simethicone chewable tablet 80 mg QID PRN    sodium chloride 0.9% flush 10 mL PRN    sucralfate 100 mg/mL suspension 1 g Q6H    [START ON 2/19/2024] tenofovir disoproxil fumarate tablet 300 mg Weekly    vitamin renal formula (B-complex-vitamin c-folic acid) 1 mg per capsule 1 capsule Daily     Facility-Administered Medications Ordered in Other Encounters   Medication Frequency    0.9%  NaCl infusion Continuous    sodium chloride 0.9% flush 10 mL PRN     Family History       Problem Relation (Age of Onset)    Anesthesia problems Paternal Uncle    Cancer Mother    Diabetes Paternal Aunt, Paternal Aunt    Heart attack Father    Heart failure Paternal Grandmother    Hypertension Paternal Aunt    Leukemia Mother, Paternal Aunt    No Known Problems Sister, Brother, Sister, Sister    Stroke Paternal Aunt    Suicide Paternal Uncle    Valvular heart disease Maternal Aunt          Tobacco Use    Smoking status: Never    Smokeless tobacco: Never   Substance and Sexual Activity    Alcohol use: No     Comment: quit in 2016; does have heavy drinking history; started drinking at age 13; was drinking a 12 pack over the weekend and two 24 ounces of beer a day during the week along with a pint of hard alcohol    Drug use: No    Sexual activity: Not Currently     Review of Systems   Constitutional:  Negative for activity change and appetite change.   HENT:  Negative for facial swelling.    Respiratory:  Negative for shortness of breath.    Cardiovascular:  Negative for leg swelling.   Gastrointestinal:  Positive for constipation. Negative for nausea and vomiting.   Genitourinary:  Positive for decreased urine volume.   Allergic/Immunologic: Positive for immunocompromised state.   Psychiatric/Behavioral:  Negative for confusion and decreased concentration.       Objective:     Vital Signs (Most Recent):  Temp: 98.2 °F (36.8 °C) (02/17/24 1525)  Pulse: 80 (02/17/24 1530)  Resp: 18 (02/17/24 1525)  BP: (!) 125/58 (02/17/24 1525)  SpO2: 97 % (02/17/24 1525) Vital Signs (24h Range):  Temp:  [98 °F (36.7 °C)-98.6 °F (37 °C)] 98.2 °F (36.8 °C)  Pulse:  [73-88] 80  Resp:  [16-18] 18  SpO2:  [94 %-97 %] 97 %  BP: ()/(51-63) 125/58     Weight: 96.6 kg (212 lb 15.4 oz) (02/16/24 2143)  Body mass index is 27.34 kg/m².  Body surface area is 2.25 meters squared.    No intake/output data recorded.    Physical Exam  Vitals and nursing note reviewed.   Constitutional:       General: He is not in acute distress.  HENT:      Head: Atraumatic.   Cardiovascular:      Rate and Rhythm: Normal rate.   Pulmonary:      Effort: Pulmonary effort is normal. No respiratory distress.      Breath sounds: No wheezing or rales.   Abdominal:      Palpations: Abdomen is soft.      Tenderness: There is no abdominal tenderness.   Musculoskeletal:      Right lower leg: No edema.      Left lower leg: No edema.   Skin:     General: Skin is warm and dry.   Neurological:      Mental Status: He is alert and oriented to person, place, and time.   Psychiatric:         Mood and Affect: Mood normal.         Significant Labs: reviewed labs, [facility]      Assessment/Plan:     Active Diagnoses:    Diagnosis Date Noted POA    PRINCIPAL PROBLEM:  Preoperative clearance [Z01.818] 02/16/2024 Not Applicable    GERD (gastroesophageal reflux disease) [K21.9] 02/16/2024 Unknown    Chronic hepatitis B [B18.1] 12/06/2023 Yes    Hypertriglyceridemia [E78.1] 12/06/2023 Yes    Atrial fibrillation [I48.91] 03/27/2019 Yes    Chronic diastolic heart failure [I50.32] 03/11/2017 Yes    ESRD (end stage renal disease) on dialysis [N18.6, Z99.2]  Not Applicable     Chronic    History of mechanical aortic valve replacement [Z95.2] 02/09/2017 Not Applicable    Essential hypertension [I10]  Yes      Problems Resolved During  this Admission:       ESRD MWF  - plan for next dialysis Monday, anticipated surgery is Tuesday     HTN  - monitor on home meds    Secondary HPT  - resume calcitriol and Sensipar  - phos binders  - low phos diet    Nutrition  - low potassium diet  - dialysis vitamin     Anemia, multifactorial  - on Mircera as outpatient  - Epogen as indicated while inpatient       Thank you for your consult. I will follow-up with patient. Please contact us if you have any additional questions.    David Garcia MD  Nephrology

## 2024-02-18 NOTE — HOSPITAL COURSE
2/18/24- Surgery planned for Tuesday, currently bridging IV heparin , while holding coumadin. Pt without any complaints. BP is stable.     2/19/24: pt has right flank pain today, concerns for bleed on heparin gtt.  Urology ordering CT scan, working pt up.  Surgery  needs to be cancelled.    2/20/24-Patient seen and examined today, resting in bed. Feeling better. Abdominal pain/soreness improved. CT yesterday with blood clot within urinary bladder, slightly increased hydronephrosis within the right kidney with high density thought to reflect blood products. Urology following, no intervention planned, will monitor. INR 1.2, needs to resume heparin bridge tmw. Will discuss resumption of Coumadin today.    2/21/24-Patient seen and examined today. Complains of mild suprapubic pressure/right flank pain. No CV complaints. INR 1.6, on Lovenox bridge. Coumadin resumed yesterday.

## 2024-02-18 NOTE — NURSING
"Patient stated he felt like he had to urinate and when he did "a lot of" bright red blood came out. MD and urologist notified.   "

## 2024-02-18 NOTE — PROGRESS NOTES
[facility] Follow up 02/18/2024    Subjective:   Complains of some abdominal discomfort secondary to constipation.  Denies any hematuria or blood in stool.      Current Facility-Administered Medications   Medication Dose Route Frequency Provider Last Rate Last Admin    acetaminophen suppository 650 mg  650 mg Rectal Q4H PRN Nimesh Rosas NP        acetaminophen tablet 650 mg  650 mg Oral Q6H PRN Nimesh Rosas NP        aluminum-magnesium hydroxide-simethicone 200-200-20 mg/5 mL suspension 30 mL  30 mL Oral QID (AC & HS) Bonifacio Shirley MD   30 mL at 02/17/24 1157    amLODIPine tablet 10 mg  10 mg Oral Daily Bonifacio Shirley MD        [START ON 2/19/2024] calcitRIOL capsule 0.5 mcg  0.5 mcg Oral Every Mon, Wed, David Clay MD        calcium acetate(phosphat bind) capsule 2,001 mg  2,001 mg Oral TID WM Bonifacio Shirley MD   2,001 mg at 02/18/24 0800    [START ON 2/19/2024] cinacalcet tablet 30 mg  30 mg Oral Every Mon, Wed, David Clay MD        dextrose 10% bolus 125 mL 125 mL  12.5 g Intravenous PRN Nimesh Rosas NP        dextrose 10% bolus 250 mL 250 mL  25 g Intravenous PRN Nimesh Rosas NP        diltiaZEM 24 hr capsule 420 mg  420 mg Oral Daily Bonifacio Shirley MD   420 mg at 02/18/24 0801    glucagon (human recombinant) injection 1 mg  1 mg Intramuscular PRN Nimesh Rosas NP        glucose chewable tablet 16 g  16 g Oral PRN Nimesh Rosas NP        glucose chewable tablet 24 g  24 g Oral PRN Nimesh Rosas NP        heparin 25,000 units in dextrose 5% (100 units/ml) IV bolus from bag LOW INTENSITY nomogram - [facility]  60 Units/kg (Adjusted) Intravenous PRN Nimesh Rosas NP        heparin 25,000 units in dextrose 5% (100 units/ml) IV bolus from bag LOW INTENSITY nomogram - [facility]  30 Units/kg (Adjusted) Intravenous PRN [place]ro, Nimesh J, NP        heparin 25,000 units in dextrose 5% 250 mL (100 units/mL) infusion LOW  INTENSITY nomogram - [facility]  0-40 Units/kg/hr (Adjusted) Intravenous Continuous Nimesh Rosas, NP 10.6 mL/hr at 02/17/24 1656 12 Units/kg/hr at 02/17/24 1656    hydrALAZINE tablet 100 mg  100 mg Oral TID Bonifacio Shirley MD        hydrOXYzine HCL tablet 25 mg  25 mg Oral Daily PRN Bonifacio Shirley MD        labetaloL tablet 200 mg  200 mg Oral TID Bonifacio Shirley MD   200 mg at 02/18/24 0801    lisinopriL tablet 40 mg  40 mg Oral Daily Bonifacio Shirley MD        melatonin tablet 6 mg  6 mg Oral Nightly PRN Bonifacio Shirley MD        naloxone 0.4 mg/mL injection 0.02 mg  0.02 mg Intravenous PRN Nimesh Rosas, ALEX        ondansetron injection 4 mg  4 mg Intravenous Q8H PRN Nimesh Rosas, ALEX        pantoprazole EC tablet 40 mg  40 mg Oral Daily Bonifacio Shirley MD   40 mg at 02/18/24 0801    polyethylene glycol packet 17 g  17 g Oral Daily PRN Nimesh Rosas, NP   17 g at 02/18/24 0900    promethazine tablet 25 mg  25 mg Oral Q6H PRN Nimesh Rosas, ALEX        simethicone chewable tablet 80 mg  1 tablet Oral QID PRN Nimesh Rosas, NP        sodium chloride 0.9% flush 10 mL  10 mL Intravenous PRN Bonifacio Shirley MD        sucralfate 100 mg/mL suspension 1 g  1 g Oral Q6H Bonifacio Shirley MD   1 g at 02/17/24 1157    [START ON 2/19/2024] tenofovir disoproxil fumarate tablet 300 mg  300 mg Oral Weekly Bonifacio Shirley MD        vitamin renal formula (B-complex-vitamin c-folic acid) 1 mg per capsule 1 capsule  1 capsule Oral Daily Bonifacio Shirley MD   1 capsule at 02/18/24 0801     Facility-Administered Medications Ordered in Other Encounters   Medication Dose Route Frequency Provider Last Rate Last Admin    0.9%  NaCl infusion   Intravenous Continuous Christopher Jane MD        sodium chloride 0.9% flush 10 mL  10 mL Intravenous PRN Christopher Jane MD           Objective:     Vitals:    02/18/24 0756   BP:    Pulse: 80   Resp: 18   Temp:      I/O  last 3 completed shifts:  In: 1098.9 [P.O.:1000; I.V.:98.9]  Out: -   Temp (24hrs), Av.1 °F (36.7 °C), Min:97.9 °F (36.6 °C), Max:98.6 °F (37 °C)      Physical Exam:  No acute distress alert  Respirations   Abdomen is soft    Labs    Recent Results (from the past 336 hour(s))   Basic metabolic panel    Collection Time: 24  9:23 PM   Result Value Ref Range    Sodium 142 136 - 145 mmol/L    Potassium 4.0 3.5 - 5.1 mmol/L    Chloride 97 95 - 110 mmol/L    CO2 26 23 - 29 mmol/L    BUN 42 (H) 6 - 20 mg/dL    Creatinine 11.4 (H) 0.5 - 1.4 mg/dL    Calcium 9.5 8.7 - 10.5 mg/dL    Anion Gap 19 (H) 8 - 16 mmol/L     Recent Results (from the past 336 hour(s))   CBC auto differential    Collection Time: 24  5:23 AM   Result Value Ref Range    WBC 6.75 3.90 - 12.70 K/uL    Hemoglobin 11.6 (L) 14.0 - 18.0 g/dL    Hematocrit 34.5 (L) 40.0 - 54.0 %    Platelets 149 (L) 150 - 450 K/uL   CBC auto differential    Collection Time: 24  6:59 AM   Result Value Ref Range    WBC 6.18 3.90 - 12.70 K/uL    Hemoglobin 11.3 (L) 14.0 - 18.0 g/dL    Hematocrit 34.3 (L) 40.0 - 54.0 %    Platelets 159 150 - 450 K/uL   CBC auto differential    Collection Time: 24  9:23 PM   Result Value Ref Range    WBC 6.38 3.90 - 12.70 K/uL    Hemoglobin 12.0 (L) 14.0 - 18.0 g/dL    Hematocrit 35.6 (L) 40.0 - 54.0 %    Platelets 163 150 - 450 K/uL     Microbiology Results (last 7 days)       ** No results found for the last 168 hours. **                Assessment  Mechanical heart valve on anticoagulation  CHF   End-stage renal disease on HD  AFib   ED    Plan  Pre admitted for heparin bridge.  Continue heparin.  Plans for hemodialysis tomorrow.  Plan IPP on Tuesday.  Appreciate consultants assistance.

## 2024-02-19 LAB
ALBUMIN SERPL BCP-MCNC: 3.2 G/DL (ref 3.5–5.2)
ANION GAP SERPL CALC-SCNC: 19 MMOL/L (ref 8–16)
APTT PPP: 40.7 SEC (ref 21–32)
BASOPHILS # BLD AUTO: 0.02 K/UL (ref 0–0.2)
BASOPHILS # BLD AUTO: 0.02 K/UL (ref 0–0.2)
BASOPHILS NFR BLD: 0.2 % (ref 0–1.9)
BASOPHILS NFR BLD: 0.3 % (ref 0–1.9)
BUN SERPL-MCNC: 82 MG/DL (ref 6–20)
CALCIUM SERPL-MCNC: 9.8 MG/DL (ref 8.7–10.5)
CHLORIDE SERPL-SCNC: 97 MMOL/L (ref 95–110)
CO2 SERPL-SCNC: 23 MMOL/L (ref 23–29)
CREAT SERPL-MCNC: 17.7 MG/DL (ref 0.5–1.4)
DIFFERENTIAL METHOD BLD: ABNORMAL
DIFFERENTIAL METHOD BLD: ABNORMAL
EOSINOPHIL # BLD AUTO: 0.1 K/UL (ref 0–0.5)
EOSINOPHIL # BLD AUTO: 0.3 K/UL (ref 0–0.5)
EOSINOPHIL NFR BLD: 0.9 % (ref 0–8)
EOSINOPHIL NFR BLD: 3.9 % (ref 0–8)
ERYTHROCYTE [DISTWIDTH] IN BLOOD BY AUTOMATED COUNT: 15.3 % (ref 11.5–14.5)
ERYTHROCYTE [DISTWIDTH] IN BLOOD BY AUTOMATED COUNT: 15.5 % (ref 11.5–14.5)
EST. GFR  (NO RACE VARIABLE): 3 ML/MIN/1.73 M^2
GLUCOSE SERPL-MCNC: 124 MG/DL (ref 70–110)
HAV IGM SERPL QL IA: ABNORMAL
HBV CORE IGM SERPL QL IA: ABNORMAL
HBV SURFACE AG SERPL QL IA: REACTIVE
HBV SURFACE AG SERPL QL NT: ABNORMAL
HCT VFR BLD AUTO: 30.4 % (ref 40–54)
HCT VFR BLD AUTO: 32.5 % (ref 40–54)
HCV AB SERPL QL IA: ABNORMAL
HGB BLD-MCNC: 10.2 G/DL (ref 14–18)
HGB BLD-MCNC: 10.6 G/DL (ref 14–18)
IMM GRANULOCYTES # BLD AUTO: 0.01 K/UL (ref 0–0.04)
IMM GRANULOCYTES # BLD AUTO: 0.02 K/UL (ref 0–0.04)
IMM GRANULOCYTES NFR BLD AUTO: 0.1 % (ref 0–0.5)
IMM GRANULOCYTES NFR BLD AUTO: 0.2 % (ref 0–0.5)
LYMPHOCYTES # BLD AUTO: 0.9 K/UL (ref 1–4.8)
LYMPHOCYTES # BLD AUTO: 1 K/UL (ref 1–4.8)
LYMPHOCYTES NFR BLD: 14.2 % (ref 18–48)
LYMPHOCYTES NFR BLD: 9.6 % (ref 18–48)
MCH RBC QN AUTO: 32.8 PG (ref 27–31)
MCH RBC QN AUTO: 33.8 PG (ref 27–31)
MCHC RBC AUTO-ENTMCNC: 32.6 G/DL (ref 32–36)
MCHC RBC AUTO-ENTMCNC: 33.6 G/DL (ref 32–36)
MCV RBC AUTO: 101 FL (ref 82–98)
MCV RBC AUTO: 101 FL (ref 82–98)
MONOCYTES # BLD AUTO: 0.8 K/UL (ref 0.3–1)
MONOCYTES # BLD AUTO: 0.9 K/UL (ref 0.3–1)
MONOCYTES NFR BLD: 10.9 % (ref 4–15)
MONOCYTES NFR BLD: 9.8 % (ref 4–15)
NEUTROPHILS # BLD AUTO: 5.1 K/UL (ref 1.8–7.7)
NEUTROPHILS # BLD AUTO: 7.1 K/UL (ref 1.8–7.7)
NEUTROPHILS NFR BLD: 70.6 % (ref 38–73)
NEUTROPHILS NFR BLD: 79.3 % (ref 38–73)
NRBC BLD-RTO: 0 /100 WBC
NRBC BLD-RTO: 0 /100 WBC
PHOSPHATE SERPL-MCNC: 5.1 MG/DL (ref 2.7–4.5)
PLATELET # BLD AUTO: 155 K/UL (ref 150–450)
PLATELET # BLD AUTO: 161 K/UL (ref 150–450)
PMV BLD AUTO: 10.4 FL (ref 9.2–12.9)
PMV BLD AUTO: 10.6 FL (ref 9.2–12.9)
POTASSIUM SERPL-SCNC: 5.3 MMOL/L (ref 3.5–5.1)
RBC # BLD AUTO: 3.02 M/UL (ref 4.6–6.2)
RBC # BLD AUTO: 3.23 M/UL (ref 4.6–6.2)
SODIUM SERPL-SCNC: 139 MMOL/L (ref 136–145)
WBC # BLD AUTO: 7.16 K/UL (ref 3.9–12.7)
WBC # BLD AUTO: 8.94 K/UL (ref 3.9–12.7)

## 2024-02-19 PROCEDURE — 94761 N-INVAS EAR/PLS OXIMETRY MLT: CPT

## 2024-02-19 PROCEDURE — 85730 THROMBOPLASTIN TIME PARTIAL: CPT | Performed by: NURSE PRACTITIONER

## 2024-02-19 PROCEDURE — 63600175 PHARM REV CODE 636 W HCPCS: Mod: JZ | Performed by: INTERNAL MEDICINE

## 2024-02-19 PROCEDURE — 21400001 HC TELEMETRY ROOM

## 2024-02-19 PROCEDURE — 63600175 PHARM REV CODE 636 W HCPCS: Performed by: NURSE PRACTITIONER

## 2024-02-19 PROCEDURE — 99232 SBSQ HOSP IP/OBS MODERATE 35: CPT | Mod: ,,, | Performed by: INTERNAL MEDICINE

## 2024-02-19 PROCEDURE — 99232 SBSQ HOSP IP/OBS MODERATE 35: CPT | Mod: ,,, | Performed by: UROLOGY

## 2024-02-19 PROCEDURE — 36415 COLL VENOUS BLD VENIPUNCTURE: CPT | Mod: XB | Performed by: STUDENT IN AN ORGANIZED HEALTH CARE EDUCATION/TRAINING PROGRAM

## 2024-02-19 PROCEDURE — 25000003 PHARM REV CODE 250: Performed by: UROLOGY

## 2024-02-19 PROCEDURE — 80100014 HC HEMODIALYSIS 1:1

## 2024-02-19 PROCEDURE — 99233 SBSQ HOSP IP/OBS HIGH 50: CPT | Mod: ,,, | Performed by: INTERNAL MEDICINE

## 2024-02-19 PROCEDURE — 80069 RENAL FUNCTION PANEL: CPT | Performed by: INTERNAL MEDICINE

## 2024-02-19 PROCEDURE — 25000003 PHARM REV CODE 250: Performed by: INTERNAL MEDICINE

## 2024-02-19 PROCEDURE — 85025 COMPLETE CBC W/AUTO DIFF WBC: CPT | Performed by: NURSE PRACTITIONER

## 2024-02-19 PROCEDURE — 85025 COMPLETE CBC W/AUTO DIFF WBC: CPT | Mod: 91 | Performed by: STUDENT IN AN ORGANIZED HEALTH CARE EDUCATION/TRAINING PROGRAM

## 2024-02-19 PROCEDURE — 36415 COLL VENOUS BLD VENIPUNCTURE: CPT | Performed by: NURSE PRACTITIONER

## 2024-02-19 RX ORDER — HYDROMORPHONE HYDROCHLORIDE 1 MG/ML
0.5 INJECTION, SOLUTION INTRAMUSCULAR; INTRAVENOUS; SUBCUTANEOUS EVERY 4 HOURS PRN
Status: DISCONTINUED | OUTPATIENT
Start: 2024-02-19 | End: 2024-02-19

## 2024-02-19 RX ORDER — ACETAMINOPHEN 325 MG/1
650 TABLET ORAL EVERY 6 HOURS PRN
Status: DISCONTINUED | OUTPATIENT
Start: 2024-02-19 | End: 2024-02-22 | Stop reason: HOSPADM

## 2024-02-19 RX ORDER — OXYCODONE AND ACETAMINOPHEN 5; 325 MG/1; MG/1
2 TABLET ORAL EVERY 4 HOURS PRN
Status: DISCONTINUED | OUTPATIENT
Start: 2024-02-19 | End: 2024-02-22 | Stop reason: HOSPADM

## 2024-02-19 RX ORDER — HYDROMORPHONE HYDROCHLORIDE 1 MG/ML
1 INJECTION, SOLUTION INTRAMUSCULAR; INTRAVENOUS; SUBCUTANEOUS EVERY 4 HOURS PRN
Status: DISCONTINUED | OUTPATIENT
Start: 2024-02-19 | End: 2024-02-22 | Stop reason: HOSPADM

## 2024-02-19 RX ORDER — LIDOCAINE HYDROCHLORIDE 20 MG/ML
JELLY TOPICAL ONCE
Status: DISCONTINUED | OUTPATIENT
Start: 2024-02-19 | End: 2024-02-22 | Stop reason: HOSPADM

## 2024-02-19 RX ORDER — ACETAMINOPHEN 650 MG/1
650 SUPPOSITORY RECTAL EVERY 4 HOURS PRN
Status: DISCONTINUED | OUTPATIENT
Start: 2024-02-19 | End: 2024-02-22 | Stop reason: HOSPADM

## 2024-02-19 RX ORDER — CINACALCET 30 MG/1
30 TABLET, FILM COATED ORAL
Status: DISCONTINUED | OUTPATIENT
Start: 2024-02-19 | End: 2024-02-19

## 2024-02-19 RX ADMIN — HYDRALAZINE HYDROCHLORIDE 100 MG: 50 TABLET ORAL at 08:02

## 2024-02-19 RX ADMIN — ALUMINUM HYDROXIDE, MAGNESIUM HYDROXIDE, AND DIMETHICONE 30 ML: 200; 20; 200 SUSPENSION ORAL at 09:02

## 2024-02-19 RX ADMIN — SUCRALFATE ORAL 1 G: 1 SUSPENSION ORAL at 11:02

## 2024-02-19 RX ADMIN — CALCIUM ACETATE 2001 MG: 667 CAPSULE ORAL at 08:02

## 2024-02-19 RX ADMIN — TENOFOVIR DISPROXIL FUMARATE 300 MG: 300 TABLET ORAL at 06:02

## 2024-02-19 RX ADMIN — SUCRALFATE ORAL 1 G: 1 SUSPENSION ORAL at 06:02

## 2024-02-19 RX ADMIN — LABETALOL HYDROCHLORIDE 200 MG: 200 TABLET, FILM COATED ORAL at 09:02

## 2024-02-19 RX ADMIN — HYDRALAZINE HYDROCHLORIDE 100 MG: 50 TABLET ORAL at 09:02

## 2024-02-19 RX ADMIN — OXYCODONE HYDROCHLORIDE AND ACETAMINOPHEN 2 TABLET: 5; 325 TABLET ORAL at 10:02

## 2024-02-19 RX ADMIN — CALCIUM ACETATE 2001 MG: 667 CAPSULE ORAL at 11:02

## 2024-02-19 RX ADMIN — DILTIAZEM HYDROCHLORIDE 420 MG: 240 CAPSULE, COATED, EXTENDED RELEASE ORAL at 08:02

## 2024-02-19 RX ADMIN — HYDRALAZINE HYDROCHLORIDE 100 MG: 50 TABLET ORAL at 06:02

## 2024-02-19 RX ADMIN — ALUMINUM HYDROXIDE, MAGNESIUM HYDROXIDE, AND DIMETHICONE 30 ML: 200; 20; 200 SUSPENSION ORAL at 11:02

## 2024-02-19 RX ADMIN — OXYCODONE HYDROCHLORIDE AND ACETAMINOPHEN 2 TABLET: 5; 325 TABLET ORAL at 06:02

## 2024-02-19 RX ADMIN — LISINOPRIL 40 MG: 20 TABLET ORAL at 08:02

## 2024-02-19 RX ADMIN — LABETALOL HYDROCHLORIDE 200 MG: 200 TABLET, FILM COATED ORAL at 06:02

## 2024-02-19 RX ADMIN — HYDROMORPHONE HYDROCHLORIDE 0.5 MG: 1 INJECTION, SOLUTION INTRAMUSCULAR; INTRAVENOUS; SUBCUTANEOUS at 03:02

## 2024-02-19 RX ADMIN — CINACALCET 30 MG: 30 TABLET, FILM COATED ORAL at 08:02

## 2024-02-19 RX ADMIN — OXYCODONE HYDROCHLORIDE AND ACETAMINOPHEN 2 TABLET: 5; 325 TABLET ORAL at 11:02

## 2024-02-19 RX ADMIN — CALCITRIOL CAPSULES 0.25 MCG 0.5 MCG: 0.25 CAPSULE ORAL at 08:02

## 2024-02-19 RX ADMIN — ALUMINUM HYDROXIDE, MAGNESIUM HYDROXIDE, AND DIMETHICONE 30 ML: 200; 20; 200 SUSPENSION ORAL at 06:02

## 2024-02-19 RX ADMIN — ONDANSETRON 4 MG: 2 INJECTION INTRAMUSCULAR; INTRAVENOUS at 08:02

## 2024-02-19 RX ADMIN — HYDROMORPHONE HYDROCHLORIDE 1 MG: 1 INJECTION, SOLUTION INTRAMUSCULAR; INTRAVENOUS; SUBCUTANEOUS at 08:02

## 2024-02-19 RX ADMIN — AMLODIPINE BESYLATE 10 MG: 10 TABLET ORAL at 08:02

## 2024-02-19 RX ADMIN — NEPHROCAP 1 CAPSULE: 1 CAP ORAL at 08:02

## 2024-02-19 RX ADMIN — LABETALOL HYDROCHLORIDE 200 MG: 200 TABLET, FILM COATED ORAL at 08:02

## 2024-02-19 RX ADMIN — PANTOPRAZOLE SODIUM 40 MG: 40 TABLET, DELAYED RELEASE ORAL at 08:02

## 2024-02-19 RX ADMIN — Medication 6 MG: at 09:02

## 2024-02-19 RX ADMIN — CALCIUM ACETATE 2001 MG: 667 CAPSULE ORAL at 06:02

## 2024-02-19 RX ADMIN — EPOETIN ALFA 5000 UNITS: 10000 SOLUTION INTRAVENOUS; SUBCUTANEOUS at 05:02

## 2024-02-19 NOTE — SUBJECTIVE & OBJECTIVE
Review of Systems   Constitutional: Negative.   HENT: Negative.     Eyes: Negative.    Cardiovascular: Negative.    Respiratory: Negative.     Endocrine: Negative.    Hematologic/Lymphatic: Negative.    Skin: Negative.    Musculoskeletal: Negative.    Gastrointestinal:  Positive for abdominal pain.   Genitourinary:  Positive for hematuria.   Neurological: Negative.    Psychiatric/Behavioral: Negative.     Allergic/Immunologic: Negative.      Objective:     Vital Signs (Most Recent):  Temp: 98 °F (36.7 °C) (02/19/24 0312)  Pulse: 76 (02/19/24 0820)  Resp: 16 (02/19/24 0811)  BP: (!) 113/57 (02/19/24 0808)  SpO2: 98 % (02/19/24 0811) Vital Signs (24h Range):  Temp:  [97.5 °F (36.4 °C)-98 °F (36.7 °C)] 98 °F (36.7 °C)  Pulse:  [64-76] 76  Resp:  [14-18] 16  SpO2:  [93 %-99 %] 98 %  BP: (113-148)/(56-78) 113/57     Weight: 96.6 kg (212 lb 15.4 oz)  Body mass index is 27.34 kg/m².     SpO2: 98 %         Intake/Output Summary (Last 24 hours) at 2/19/2024 1007  Last data filed at 2/19/2024 0500  Gross per 24 hour   Intake 1219.6 ml   Output --   Net 1219.6 ml       Lines/Drains/Airways       Central Venous Catheter Line  Duration                  Hemodialysis Catheter 07/02/20 right internal jugular 1327 days         Hemodialysis AV Graft 07/04/20 0701 Left upper arm 1325 days              Peripheral Intravenous Line  Duration                  Peripheral IV - Single Lumen 02/16/24 0953 20 G Anterior;Right Forearm 3 days                       Physical Exam  Vitals and nursing note reviewed.   Constitutional:       Appearance: He is well-developed.   HENT:      Head: Normocephalic.   Neck:      Thyroid: No thyromegaly.      Vascular: No carotid bruit or JVD.   Cardiovascular:      Rate and Rhythm: Normal rate and regular rhythm.      Pulses:           Radial pulses are 2+ on the right side and 2+ on the left side.      Heart sounds: S1 normal and S2 normal. Heart sounds not distant. No midsystolic click and no opening  "snap. No murmur heard.     No friction rub. No S3 or S4 sounds.   Pulmonary:      Effort: Pulmonary effort is normal.      Breath sounds: Normal breath sounds. No wheezing or rales.   Abdominal:      General: Bowel sounds are decreased.      Palpations: Abdomen is soft.      Tenderness: There is abdominal tenderness in the right upper quadrant and right lower quadrant. There is guarding.   Musculoskeletal:      Cervical back: Neck supple.   Skin:     General: Skin is warm.   Neurological:      Mental Status: He is alert and oriented to person, place, and time.   Psychiatric:         Behavior: Behavior normal.            Significant Labs: ABG: No results for input(s): "PH", "PCO2", "HCO3", "POCSATURATED", "BE" in the last 48 hours., Blood Culture: No results for input(s): "LABBLOO" in the last 48 hours., BMP:   Recent Labs   Lab 02/19/24  0422   *      K 5.3*   CL 97   CO2 23   BUN 82*   CREATININE 17.7*   CALCIUM 9.8   , CMP   Recent Labs   Lab 02/19/24  0422      K 5.3*   CL 97   CO2 23   *   BUN 82*   CREATININE 17.7*   CALCIUM 9.8   ALBUMIN 3.2*   ANIONGAP 19*   , CBC   Recent Labs   Lab 02/18/24  0523 02/18/24  1720 02/19/24  0423   WBC 6.75 7.49 7.16   HGB 11.6* 12.0* 10.6*   HCT 34.5* 36.0* 32.5*   * 166 155   , INR   Recent Labs   Lab 02/18/24  1913   INR 1.3*   , Lipid Panel No results for input(s): "CHOL", "HDL", "LDLCALC", "TRIG", "CHOLHDL" in the last 48 hours., and Troponin No results for input(s): "TROPONINI" in the last 48 hours.    Significant Imaging: Echocardiogram: Transthoracic echo (TTE) complete (Cupid Only):   Results for orders placed or performed during the hospital encounter of 04/27/23   Echo   Result Value Ref Range    BSA 2.24 m2    LV LATERAL E/E' RATIO 19.50 m/s    LA WIDTH 3.50 cm    Left Ventricular Outflow Tract Mean Velocity 1.01 cm/s    Left Ventricular Outflow Tract Mean Gradient 4.38 mmHg    Pulmonary Valve Mean Velocity 0.77 m/s    TDI LATERAL " 0.08 m/s    PV PEAK VELOCITY 1.25 cm/s    LVIDd 4.57 3.5 - 6.0 cm    IVS 1.45 (A) 0.6 - 1.1 cm    Posterior Wall 1.25 (A) 0.6 - 1.1 cm    Ao root annulus 3.89 cm    LVIDs 3.52 2.1 - 4.0 cm    FS 23 28 - 44 %    LA volume 81.61 cm3    Sinus 2.73 cm    STJ 2.84 cm    LV mass 240.88 g    LA size 4.53 cm    RVDD 2.69 cm    Left Ventricle Relative Wall Thickness 0.55 cm    AV mean gradient 19 mmHg    AV valve area 1.50 cm2    AV Velocity Ratio 0.44     AV index (prosthetic) 0.47     MV mean gradient 11 mmHg    MV valve area p 1/2 method 3.52 cm2    MV valve area by continuity eq 1.18 cm2    E/A ratio 0.79     E wave deceleration time 181.51 msec    LVOT diameter 2.02 cm    LVOT area 3.2 cm2    LVOT peak nikita 1.33 m/s    LVOT peak VTI 27.70 cm    Ao peak nikita 3.04 m/s    Ao VTI 59.3 cm    RVOT peak nikita 0.79 m/s    RVOT peak VTI 17.4 cm    LVOT stroke volume 88.73 cm3    AV peak gradient 37 mmHg    MV peak gradient 18 mmHg    PV mean gradient 1.15 mmHg    MV Peak E Nikita 1.56 m/s    TR Max Nikita 3.33 m/s    MV VTI 75.5 cm    MV stenosis pressure 1/2 time 62.46 ms    MV Peak A Nikita 1.97 m/s    LV Systolic Volume 51.59 mL    LV Systolic Volume Index 23.1 mL/m2    LV Diastolic Volume 96.00 mL    LV Diastolic Volume Index 43.05 mL/m2    LA Volume Index 36.6 mL/m2    LV Mass Index 108 g/m2    RA Major Axis 4.46 cm    Left Atrium Minor Axis 6.42 cm    Left Atrium Major Axis 5.73 cm    Triscuspid Valve Regurgitation Peak Gradient 44 mmHg    LA Volume Index (Mod) 41.0 mL/m2    LA volume (mod) 91.32 cm3    RA Width 3.59 cm    Right Atrial Pressure (from IVC) 3 mmHg    EF 50 %    TV resting pulmonary artery pressure 47 mmHg    Narrative    · The left ventricle is normal in size with concentric remodeling and low   normal systolic function.  · Mild left atrial enlargement.  · Grade I left ventricular diastolic dysfunction.  · The estimated PA systolic pressure is 47 mmHg.  · Normal right ventricular size with normal right ventricular  systolic   function.  · There is pulmonary hypertension.  · Normal central venous pressure (3 mmHg).  · There is a bileaflet tilting disc mechanical aortic valve present.   Prosthetic aortic valve is normal.  · The aortic valve mean gradient is 19 mmHg with a dimensionless index of   0.47.  · The estimated ejection fraction is 50%.  · The mean diastolic gradient across the mitral valve is 11 mmHg at a   heart rate of 76 bpm.  · There is mild to moderate mitral stenosis.  · Mild to moderate tricuspid regurgitation.

## 2024-02-19 NOTE — SUBJECTIVE & OBJECTIVE
Review of Systems      Constitutional:  Negative for chills, diaphoresis, fatigue and fever.   Respiratory:  Negative for cough and shortness of breath.    Cardiovascular:  Negative for chest pain, palpitations and leg swelling.       Objective:     Vital Signs (Most Recent):  Temp: 98 °F (36.7 °C) (02/19/24 1126)  Pulse: 75 (02/19/24 1126)  Resp: 18 (02/19/24 1130)  BP: 131/60 (02/19/24 1126)  SpO2: (!) 92 % (02/19/24 1126) Vital Signs (24h Range):  Temp:  [97.6 °F (36.4 °C)-98 °F (36.7 °C)] 98 °F (36.7 °C)  Pulse:  [64-76] 75  Resp:  [16-18] 18  SpO2:  [92 %-99 %] 92 %  BP: (113-148)/(57-78) 131/60     Weight: 96.6 kg (212 lb 15.4 oz)  Body mass index is 27.34 kg/m².    Intake/Output Summary (Last 24 hours) at 2/19/2024 1225  Last data filed at 2/19/2024 0500  Gross per 24 hour   Intake 1219.6 ml   Output --   Net 1219.6 ml         Physical Exam    Constitutional:       General: He is awake. He is not in acute distress.     Appearance: Normal appearance. He is well-developed and well-groomed. He is not ill-appearing, toxic-appearing or diaphoretic.   HENT:      Head: Normocephalic and atraumatic.   Eyes:      Extraocular Movements: Extraocular movements intact.      Conjunctiva/sclera: Conjunctivae normal.   Cardiovascular:      Rate and Rhythm: Normal rate and regular rhythm.      Pulses: Normal pulses.      Heart sounds: Murmur heard.      Arteriovenous access: Left arteriovenous access is present.     Comments: +bruit and thrill noted to LLE.  Pulmonary:      Effort: Pulmonary effort is normal.      Breath sounds: Normal breath sounds. No decreased breath sounds, wheezing, rhonchi or rales.   Abdominal:      General: Bowel sounds are normal.      Palpations: Abdomen is soft.      Tenderness: There is no abdominal tenderness.   Musculoskeletal:      Cervical back: Normal range of motion and neck supple.      Right lower leg: No edema.      Left lower leg: No edema.      Comments: 5/5 strength throughout    Skin:     General: Skin is warm and dry.      Capillary Refill: Capillary refill takes less than 2 seconds.   Neurological:      General: No focal deficit present.      Mental Status: He is alert and oriented to person, place, and time. Mental status is at baseline.      GCS: GCS eye subscore is 4. GCS verbal subscore is 5. GCS motor subscore is 6.      Cranial Nerves: Cranial nerves 2-12 are intact.      Motor: Motor function is intact.   Psychiatric:         Mood and Affect: Mood normal.         Behavior: Behavior normal. Behavior is cooperative.       Significant Labs: All pertinent labs within the past 24 hours have been reviewed.  CBC:   Recent Labs   Lab 02/18/24  0523 02/18/24  1720 02/19/24  0423   WBC 6.75 7.49 7.16   HGB 11.6* 12.0* 10.6*   HCT 34.5* 36.0* 32.5*   * 166 155     CMP:   Recent Labs   Lab 02/19/24  0422      K 5.3*   CL 97   CO2 23   *   BUN 82*   CREATININE 17.7*   CALCIUM 9.8   ALBUMIN 3.2*   ANIONGAP 19*       Significant Imaging:

## 2024-02-19 NOTE — NURSING
Patient c/o a second occurrence where he urinated blood. MD notified. Continue heparin until urology gives recommendations per MD. PTT and INR ordered. On call urologist added to conversation. Continue heparin per Urologist.

## 2024-02-19 NOTE — PLAN OF CARE
[id number] MELVI White Jr. 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     HD M, W, F    ESRD on dialysis     GERD (gastroesophageal reflux disease)     Hepatitis B     Hyperlipidemia     Hypertension     Nightmares     Obesity     DANIEL (obstructive sleep apnea) 11/12/2019    Secondary hyperparathyroidism of renal origin     Supraventricular tachycardia     atrial tachycardia    Tachycardia     Valvular regurgitation     AI, TR      PRN meds    acetaminophen, 650 mg, Q4H PRN  acetaminophen, 650 mg, Q6H PRN  dextrose 10%, 12.5 g, PRN  dextrose 10%, 25 g, PRN  glucagon (human recombinant), 1 mg, PRN  glucose, 16 g, PRN  glucose, 24 g, PRN  HYDROmorphone, 1 mg, Q4H PRN  hydrOXYzine HCL, 25 mg, Daily PRN  melatonin, 6 mg, Nightly PRN  naloxone, 0.02 mg, PRN  ondansetron, 4 mg, Q8H PRN  oxyCODONE-acetaminophen, 2 tablet, Q4H PRN  polyethylene glycol, 17 g, Daily PRN  promethazine, 25 mg, Q6H PRN  simethicone, 1 tablet, QID PRN  sodium chloride 0.9%, 250 mL, PRN  sodium chloride 0.9%, 10 mL, PRN      Chart check completed. Will continue plan of care.      Orientation: oriented x 4  Abi Coma Scale Score: 15     Lead Monitored: Lead II Rhythm: sinus arrhythmia Frequency/Ectopy: frequent, PACs, PVCs  Cardiac/Telemetry Box Number: [id number]  VTE Required Core Measure: Pharmacological prophylaxis initiated/maintained Last Bowel Movement: 02/16/24  Diet NPO  Diet NPO  Voiding Characteristics: anuria  Ky Score: 22  Fall Risk Score: 11  Accucheck []   Freq?      Lines/Drains/Airways       Central Venous Catheter Line  Duration                   Hemodialysis Catheter 07/02/20 right internal jugular 1327 days         Hemodialysis AV Graft 07/04/20 0701 Left upper arm 1325 days              Peripheral Intravenous Line  Duration                  Peripheral IV - Single Lumen 02/16/24 0953 20 G Anterior;Right Forearm 3 days                       Problem: Adult Inpatient Plan of Care  Goal: Plan of Care Review  Outcome: Ongoing, Progressing  Goal: Patient-Specific Goal (Individualized)  Outcome: Ongoing, Progressing  Goal: Absence of Hospital-Acquired Illness or Injury  Outcome: Ongoing, Progressing  Goal: Optimal Comfort and Wellbeing  Outcome: Ongoing, Progressing  Goal: Readiness for Transition of Care  Outcome: Ongoing, Progressing     Problem: Infection  Goal: Absence of Infection Signs and Symptoms  Outcome: Ongoing, Progressing     Problem: Fall Injury Risk  Goal: Absence of Fall and Fall-Related Injury  Outcome: Ongoing, Progressing     Problem: Device-Related Complication Risk (Hemodialysis)  Goal: Safe, Effective Therapy Delivery  Outcome: Ongoing, Progressing     Problem: Hemodynamic Instability (Hemodialysis)  Goal: Effective Tissue Perfusion  Outcome: Ongoing, Progressing     Problem: Infection (Hemodialysis)  Goal: Absence of Infection Signs and Symptoms  Outcome: Ongoing, Progressing

## 2024-02-19 NOTE — PLAN OF CARE
3 hour I-HDTx initiated and in progress as ordered for Dr Garcia; planned NET UF = up to 3.5L as tolerated, pt is being dialyzed at the bedside r/t HBsAG+ results on 2/18/2024 and cannot be dialyzed in the Acute room . Noted with Left UE AVF easily cannulated with 15ga dialysis angios x 2, noted with good blood return and easily flushed. All lines connected and secured , will continue to monitor for changes and trends.

## 2024-02-19 NOTE — HOSPITAL COURSE
Isidro White Jr. is a 54 y.o. 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 Presents to the hospital as a direct admit for bridging of coumadin with heparin to have penile prosthesis surgery performed by Urology (Dr. Shirley) on 02/20/2024.  Patient receives HD on M/W/F and states he received his full dialysis session prior to arrival to the hospital.  States he does not produce urine. Reports last dose of Coumadin was yesterday.  Denies any complaints at this time.  Hospital Medicine consulted to manage comorbidities/medical problems and to manage heparin drip.    02/19/2024   Patient initially presented to undergo elective penile prosthesis implantation, Urology primary on patient, hospital medicine consulted for medical management.    Patient on Coumadin outpatient for mechanical aortic valve prosthesis, bridged to heparin drip for urological intervention.    On 02/19/2024, patient continued to have persistent hematuria with drop in hemoglobin, heparin drip held per Cardiology, Urology plans to postpone penile prosthesis implantation for now, plans for clot evacuation tomorrow.  Follow up on stat CT abdomen pelvis.  Follow up on repeat CBC this afternoon.      02/20/2024   Initially urology plan for clot evacuation, however patient noted to have improvement in hematuria, no plans for clot evacuation at this point, resumed diet per Urology.    Given recent hematuria, Urology plans to hold penile prosthesis implantation at this point.    Plans to bridge  heparin to Coumadin, however patient refused heparin drip, Lovenox initiated per Urology   Monitor labs    02/21/2024   Complaining of flank pain,/ suprapubic discomfort.  Remained afebrile, hemodynamically stable, denied any hematuria.    INR 1.6, currently on Lovenox bridging to Coumadin.    2/22/24  Denied acute issues overnight   INR trended up to 1.9   Primary team/urology plans to discharge patient with outpatient follow up   Patient very adamant on leaving today

## 2024-02-19 NOTE — PROGRESS NOTES
Chief Complaint:  Erectile dysfunction, PAF, ASD, status post AVR repair, mitral stenosis, dialysis dependent, here for Coumadin bridge    HPI:   2/19/24- persistent and worsening pain on the right flank, hematuria continues.    2/17/24-  54-year-old gentleman well known to the service, admitted preoperatively for Coumadin bridge for placement of penile prosthesis.      Allergies:  Patient has no known allergies.    Medications:  See MAR    Review of Systems:  General: No fever, chills, fatigability, or weight loss.  Skin: No rashes, itching, or changes in color or texture of skin.  Chest: Denies HAY, cyanosis, wheezing, cough, and sputum production.  Abdomen: Appetite fine. No weight loss. Denies diarrhea, abdominal pain, hematemesis, or blood in stool.  Musculoskeletal: No joint stiffness or swelling. Denies back pain.  : As above.  All other review of systems negative.    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    PSH:   has a past surgical history that includes ASD repair; Cardiac valuve replacement (02/08/2017); Radiofrequency ablation (03/13/2017); Cardiac catheterization; AV Graft Creation (Left, 03/2017); Colonoscopy (N/A, 8/27/2019); Esophagogastroduodenoscopy (N/A, 8/27/2019); Removal of graft (Left, 7/2/2020); Colonoscopy (N/A, 9/3/2020); and Right heart catheterization (Right, 8/12/2021).    FamHx: family history includes Anesthesia problems in his paternal uncle; Cancer in his mother; Diabetes in his paternal aunt and paternal aunt; Heart attack in his  father; Heart failure in his paternal grandmother; Hypertension in his paternal aunt; Leukemia in his mother and paternal aunt; No Known Problems in his brother, sister, sister, and sister; Stroke in his paternal aunt; Suicide in his paternal uncle; Valvular heart disease in his maternal aunt.    SocHx:  reports that he has never smoked. He has never used smokeless tobacco. He reports that he does not drink alcohol and does not use drugs.      Physical Exam:  Vitals:    02/19/24 0820   BP:    Pulse: 76   Resp:    Temp:      General: A&Ox3, no apparent distress, no deformities  Neck: No masses, normal ROM  Lungs: normal inspiration, no use of accessory muscles  Heart: normal pulse, no arrhythmias  Abdomen: Soft, ND, acute pain to right flank.  Skin: The skin is warm and dry. No jaundice.  Ext: No c/c/e.    Labs/Studies:   CBC demonstrates an H&H of 10.6 and 32.5 respectively.  INR 1.7   BMP demonstrates a BUN and creatinine of 82 and 17.7 respectively.    Impression/Plan:     1. History of mechanical aortic valve replacement- cardiology on board, heparin drip initiated.  Coumadin it has been held since 02/15/2024.    2. Chronic diastolic heart failure- CHF controlled, cardiology on board.    3. Essential hypertension- blood pressure stable, appreciate cardiology in hospital medicine assistance.    4. End-stage renal disease- nephrology consulted, patient is a MWF dialysis patient, due for dialysis today.    5. Atrial fibrillation- control with beta-blocker and calcium channel blocker, current Coumadin bridge for anticoagulation.    6. Erectile dysfunction- patient is planned for inflatable penile prosthesis on Tuesday 02/20/2024, with new hematuria and flank pain will place on hold for now.    7. Gross hematuria- repeat CT stat to evaluate for acute right renal bleed, npo now.  Hold heparin now.

## 2024-02-19 NOTE — PROGRESS NOTES
[facility]  Progress Note    Patient Name: Isidro White Jr.  MRN: [id number]  Patient Class: IP- Inpatient   Admission Date: 2/16/2024  Length of Stay: 3 days  Attending Physician: Bonifacio Shirley MD  Primary Care Provider: Boston Nevarez MD        Subjective:     Principal Problem:Preoperative clearance        HPI:  Isidro White Jr. is a 54 y.o. 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 Presents to the hospital as a direct admit for bridging of coumadin with heparin to have penile prosthesis surgery performed by Urology (Dr. Shirley) on 02/20/2024.  Patient receives HD on M/W/F and states he received his full dialysis session prior to arrival to the hospital.  States he does not produce urine. Reports last dose of Coumadin was yesterday.  Denies any complaints at this time.  Hospital Medicine consulted to manage comorbidities/medical problems and to manage heparin drip.    PCP: Boston Nevarez      Overview/Hospital Course:  Isidro White Jr. is a 54 y.o. male with a PMH  has a past medical history of Anemia, Aortic valve stenosis, Atrial fibrillation, Atrial flutter, Cardiomyopathy, CHF (congestive heart failure), Drug-induced erectile dysfunction, ESRD due to hypertension, ESRD on dialysis, GERD (gastroesophageal reflux disease), Hepatitis B, Hyperlipidemia, Hypertension, Nightmares, Obesity, DANIEL (obstructive sleep apnea) (11/12/2019), Secondary hyperparathyroidism of renal origin,  Supraventricular tachycardia, Tachycardia, and Valvular regurgitation. Presents to the hospital as a direct admit for bridging of coumadin with heparin to have penile prosthesis surgery performed by Urology (Dr. Shirley) on 02/20/2024.  Patient receives HD on M/W/F and states he received his full dialysis session prior to arrival to the hospital.  States he does not produce urine. Reports last dose of Coumadin was yesterday.  Denies any complaints at this time.  Hospital Medicine consulted to manage comorbidities/medical problems and to manage heparin drip.    02/19/2024   Patient initially presented to undergo elective penile prosthesis implantation, Urology primary on patient, hospital medicine consulted for medical management.    Patient on Coumadin outpatient for mechanical aortic valve prosthesis, bridged to heparin drip for urological intervention.    On 02/19/2024, patient continued to have persistent hematuria with drop in hemoglobin, heparin drip held per Cardiology, Urology plans to postpone penile prosthesis implantation for now, plans for clot evacuation tomorrow.  Follow up on stat CT abdomen pelvis.  Follow up on repeat CBC this afternoon.            Review of Systems      Constitutional:  Negative for chills, diaphoresis, fatigue and fever.   Respiratory:  Negative for cough and shortness of breath.    Cardiovascular:  Negative for chest pain, palpitations and leg swelling.       Objective:     Vital Signs (Most Recent):  Temp: 98 °F (36.7 °C) (02/19/24 1126)  Pulse: 75 (02/19/24 1126)  Resp: 18 (02/19/24 1130)  BP: 131/60 (02/19/24 1126)  SpO2: (!) 92 % (02/19/24 1126) Vital Signs (24h Range):  Temp:  [97.6 °F (36.4 °C)-98 °F (36.7 °C)] 98 °F (36.7 °C)  Pulse:  [64-76] 75  Resp:  [16-18] 18  SpO2:  [92 %-99 %] 92 %  BP: (113-148)/(57-78) 131/60     Weight: 96.6 kg (212 lb 15.4 oz)  Body mass index is 27.34 kg/m².    Intake/Output Summary (Last 24 hours) at 2/19/2024 1223  Last data filed at  2/19/2024 0500  Gross per 24 hour   Intake 1219.6 ml   Output --   Net 1219.6 ml         Physical Exam    Constitutional:       General: He is awake. He is not in acute distress.     Appearance: Normal appearance. He is well-developed and well-groomed. He is not ill-appearing, toxic-appearing or diaphoretic.   HENT:      Head: Normocephalic and atraumatic.   Eyes:      Extraocular Movements: Extraocular movements intact.      Conjunctiva/sclera: Conjunctivae normal.   Cardiovascular:      Rate and Rhythm: Normal rate and regular rhythm.      Pulses: Normal pulses.      Heart sounds: Murmur heard.      Arteriovenous access: Left arteriovenous access is present.     Comments: +bruit and thrill noted to LLE.  Pulmonary:      Effort: Pulmonary effort is normal.      Breath sounds: Normal breath sounds. No decreased breath sounds, wheezing, rhonchi or rales.   Abdominal:      General: Bowel sounds are normal.      Palpations: Abdomen is soft.      Tenderness: There is no abdominal tenderness.   Musculoskeletal:      Cervical back: Normal range of motion and neck supple.      Right lower leg: No edema.      Left lower leg: No edema.      Comments: 5/5 strength throughout   Skin:     General: Skin is warm and dry.      Capillary Refill: Capillary refill takes less than 2 seconds.   Neurological:      General: No focal deficit present.      Mental Status: He is alert and oriented to person, place, and time. Mental status is at baseline.      GCS: GCS eye subscore is 4. GCS verbal subscore is 5. GCS motor subscore is 6.      Cranial Nerves: Cranial nerves 2-12 are intact.      Motor: Motor function is intact.   Psychiatric:         Mood and Affect: Mood normal.         Behavior: Behavior normal. Behavior is cooperative.       Significant Labs: All pertinent labs within the past 24 hours have been reviewed.  CBC:   Recent Labs   Lab 02/18/24  0523 02/18/24  1720 02/19/24  0423   WBC 6.75 7.49 7.16   HGB 11.6* 12.0* 10.6*    HCT 34.5* 36.0* 32.5*   * 166 155     CMP:   Recent Labs   Lab 02/19/24  0422      K 5.3*   CL 97   CO2 23   *   BUN 82*   CREATININE 17.7*   CALCIUM 9.8   ALBUMIN 3.2*   ANIONGAP 19*       Significant Imaging:        Assessment/Plan:      * Preoperative clearance  Admitted for heparin bridge from coumadin for scheduled penile prosthesis surgery to be performed by Dr. Shirley.  RCRI revealed class 4 risk with 15.0% 30 day risk of death, mi, cardiac arrest.  Plan:  -pre-op labs and imaging  -heparin bridge with nomogram  -manage co-morbidities   -cards consult for surgical clearance  -urology primary team    GERD (gastroesophageal reflux disease)  Chronic. Stable. Currently asymptomatic. Home medications include PPI/Antacids as needed.  Plan:  -Continue PPI/Antacids as needed         Hypertriglyceridemia  Patient is not chronically on statin.will not continue for now. Last Lipid Panel:   Lab Results   Component Value Date    CHOL 248 (H) 11/05/2020    HDL 42 11/05/2020    LDLCALC 164.8 (H) 11/05/2020    TRIG 206 (H) 11/05/2020    CHOLHDL 16.9 (L) 11/05/2020     Plan:  -Continue home medication  -low fat/low calorie diet        Chronic hepatitis B  Patient has chronic liver disease due to hepatitis b. Their liver disease is compensated. Hepatology has not been consulted. We will obtain daily CBC, CMP, and INR.Will continue Tenofovir. Their most current Na-MELD is listed below.  MELD 3.0: 24 at 10/10/2023  9:27 AM  MELD-Na: 26 at 10/10/2023  9:27 AM  Calculated from:  Serum Creatinine: On dialysis. Using the maximum value.  Serum Sodium: 143 mmol/L (Using max of 137 mmol/L) at 10/10/2023  8:31 AM  Total Bilirubin: 0.5 mg/dL (Using min of 1 mg/dL) at 10/10/2023  8:31 AM  Serum Albumin: 3.3 g/dL at 10/10/2023  8:31 AM  INR(ratio): 1.8 at 10/10/2023  9:27 AM  Age at listing (hypothetical): 53 years  Sex: Male at 10/10/2023  9:27 AM        Atrial fibrillation  Patient with Paroxysmal (<7 days) atrial  "fibrillation which is controlled currently with Beta Blocker and Calcium Channel Blocker. Patient is currently in atrial fibrillation.[id number] Score: The patient doesn't have any registry metric data available. HASBLED Score: . Anticoagulation indicated. Anticoagulation done with coumadin, but bridging with heparin for scheduled surgical procedure .    Chronic diastolic heart failure  Patient is identified as having Diastolic (HFpEF) heart failure that is Chronic. CHF is currently controlled. Latest ECHO performed and demonstrates- Results for orders placed during the hospital encounter of 04/27/23    Echo    Interpretation Summary  · The left ventricle is normal in size with concentric remodeling and low normal systolic function.  · Mild left atrial enlargement.  · Grade I left ventricular diastolic dysfunction.  · The estimated PA systolic pressure is 47 mmHg.  · Normal right ventricular size with normal right ventricular systolic function.  · There is pulmonary hypertension.  · Normal central venous pressure (3 mmHg).  · There is a bileaflet tilting disc mechanical aortic valve present. Prosthetic aortic valve is normal.  · The aortic valve mean gradient is 19 mmHg with a dimensionless index of 0.47.  · The estimated ejection fraction is 50%.  · The mean diastolic gradient across the mitral valve is 11 mmHg at a heart rate of 76 bpm.  · There is mild to moderate mitral stenosis.  · Mild to moderate tricuspid regurgitation.  . Continue Beta Blocker and monitor clinical status closely. Monitor on telemetry. Patient is off CHF pathway.  Monitor strict Is&Os and daily weights.  Place on fluid restriction of 1.5 L. Continue to stress to patient importance of self efficacy and  on diet for CHF. Last BNP reviewed- and noted below No results for input(s): "BNP", "BNPTRIAGEBLO" in the last 168 hours..            ESRD (end stage renal disease) on dialysis  Creatine stable for now. BMP reviewed- noted Estimated " Creatinine Clearance: 8.7 mL/min (A) (based on SCr of 11.4 mg/dL (H)). according to latest data. Based on current GFR, CKD stage is end stage.  Monitor UOP and serial BMP and adjust therapy as needed. Renally dose meds. Avoid nephrotoxic medications and procedures. Nephrology consult for management of HD orders.    History of mechanical aortic valve replacement  Plan:  -continue anticoagulation therpay with heparin while in hospital  -tele monitoring  -cards consulted for surgical clearance      Essential hypertension  Chronic, controlled. Latest blood pressure and vitals reviewed-     Temp:  [98.1 °F (36.7 °C)]   Pulse:  [76-78]   Resp:  [18]   BP: (106)/(59)   SpO2:  [94 %] .   Home meds for hypertension were reviewed and noted below.   Hypertension Medications               amLODIPine (NORVASC) 10 MG tablet Take 1 tablet by mouth daily    diltiaZEM (TIADYLT ER) 420 MG Cs24 Take 1 capsule (420 mg total) by mouth once daily.    hydrALAZINE (APRESOLINE) 100 MG tablet Take 1 tablet by mouth three times a day including dialysis days    labetaloL (NORMODYNE) 200 MG tablet Take 1 tablet (200 mg total) by mouth 3 (three) times daily.    lisinopriL (PRINIVIL,ZESTRIL) 40 MG tablet Take 1 tablet (40 mg total) by mouth once daily.    pep injection Inject 0.3 ml as directed     For compounding pharmacy use:   Add PAPAVERINE 30 mcg  Add PHENTOLAMINE 10 mg  Add ALPROSTADIL 100 mcg            While in the hospital, will manage blood pressure as follows; Continue home antihypertensive regimen    Will utilize p.r.n. blood pressure medication only if patient's blood pressure greater than 160/100 and he develops symptoms such as worsening chest pain or shortness of breath.      VTE Risk Mitigation (From admission, onward)           Ordered     Reason for No Pharmacological VTE Prophylaxis  Once        Comments: Pending CoAg levels for heparin bridge   Question:  Reasons:  Answer:  Physician Provided (leave comment)    02/16/24 [id number]      Place FRANDY hose  Until discontinued         02/16/24 2018     IP VTE HIGH RISK PATIENT  Once         02/16/24 2018     Place sequential compression device  Until discontinued         02/16/24 2018                    Discharge Planning   SARBJIT:      Code Status: Full Code   Is the patient medically ready for discharge?:     Reason for patient still in hospital (select all that apply): Patient trending condition, Consult recommendations, and Pending disposition  Discharge Plan A: Home                  [facility] Radha June MD  Department of Hospital Medicine   [facility] [facility] ([facility])

## 2024-02-19 NOTE — PLAN OF CARE
[id number]/[id number] MELVI White Jr. is a 54 y.o.male admitted on 2/16/2024 for Preoperative clearance   Code Status: Full Code MRN: [id number]   Review of patient's allergies indicates:  No Known Allergies  Past Medical History:   Diagnosis Date    Anemia     Aortic valve stenosis     s/p mechanical AVR    Atrial fibrillation     Atrial flutter     Cardiomyopathy     CHF (congestive heart failure)     Drug-induced erectile dysfunction     ESRD due to hypertension     HD M, W, F    ESRD on dialysis     GERD (gastroesophageal reflux disease)     Hepatitis B     Hyperlipidemia     Hypertension     Nightmares     Obesity     DANIEL (obstructive sleep apnea) 11/12/2019    Secondary hyperparathyroidism of renal origin     Supraventricular tachycardia     atrial tachycardia    Tachycardia     Valvular regurgitation     AI, TR      PRN meds    acetaminophen, 650 mg, Q4H PRN  acetaminophen, 650 mg, Q6H PRN  dextrose 10%, 12.5 g, PRN  dextrose 10%, 25 g, PRN  glucagon (human recombinant), 1 mg, PRN  glucose, 16 g, PRN  glucose, 24 g, PRN  heparin (PORCINE), 60 Units/kg (Adjusted), PRN  heparin (PORCINE), 30 Units/kg (Adjusted), PRN  hydrOXYzine HCL, 25 mg, Daily PRN  melatonin, 6 mg, Nightly PRN  naloxone, 0.02 mg, PRN  ondansetron, 4 mg, Q8H PRN  polyethylene glycol, 17 g, Daily PRN  promethazine, 25 mg, Q6H PRN  simethicone, 1 tablet, QID PRN  sodium chloride 0.9%, 250 mL, PRN  sodium chloride 0.9%, 10 mL, PRN      Chart check completed. Will continue plan of care.      Orientation: oriented x 4  [place] Coma Scale Score: 15     Lead Monitored: Lead II Rhythm: normal sinus rhythm Frequency/Ectopy: frequent, PACs, PVCs  Cardiac/Telemetry Box Number: [id number]  VTE Required Core Measure: Pharmacological prophylaxis initiated/maintained Last Bowel Movement: 02/16/24  Diet NPO  Diet Cardiac Renal, Coumadin Restriction; Fluid - 1200mL; Standard Tray     Ky Score: 22  Fall Risk Score: 7  Accucheck []   Freq?      Lines/Drains/Airways        Central Venous Catheter Line  Duration                  Hemodialysis Catheter 07/02/20 right internal jugular 1326 days         Hemodialysis AV Graft 07/04/20 0701 Left upper arm 1324 days              Peripheral Intravenous Line  Duration                  Peripheral IV - Single Lumen 02/16/24 0953 20 G Anterior;Right Forearm 2 days                       Problem: Adult Inpatient Plan of Care  Goal: Plan of Care Review  Outcome: Ongoing, Progressing  Goal: Patient-Specific Goal (Individualized)  Outcome: Ongoing, Progressing  Goal: Absence of Hospital-Acquired Illness or Injury  Outcome: Ongoing, Progressing  Goal: Optimal Comfort and Wellbeing  Outcome: Ongoing, Progressing  Goal: Readiness for Transition of Care  Outcome: Ongoing, Progressing

## 2024-02-19 NOTE — PROGRESS NOTES
02/19/24 1503   Required for all Hemodialysis Patients   Hepatitis Status positive   Treatment Type   Treatment Type Acute   Vital Signs   Temp 99.1 °F (37.3 °C)   Temp Source Axillary   Pulse 61   Heart Rate Source Monitor   Resp 20   SpO2 97 %   Pulse Oximetry Type Intermittent   Probe Placed On (Pulse Ox) Right:;finger   Device (Oxygen Therapy) room air   BP (!) 150/97   MAP (mmHg) 113   BP Location Right arm   BP Method Automatic   Patient Position Lying        Hemodialysis AV Graft 07/04/20 0701 Left upper arm   Placement Date/Time: 07/04/20 0701   Location: Left upper arm   Needle Size 15ga   Site Assessment Clean;Dry;Intact;No redness;No swelling;No warmth;No drainage   Patency Bruit;Thrill;Present   Status Accessed   Flows Good   Dressing Intervention Integrity maintained   Dressing Status Clean;Dry;Intact   Site Condition No complications   Dressing Gauze   Drainage Description Other (Comment)  (NO DRAINAGE NOTED)     3 hr I-HDTx started.

## 2024-02-19 NOTE — PROGRESS NOTES
Critical access hospital - Telemetry ([facility])  Nephrology  Progress Note    Patient Name: Isidro White Jr.  MRN: [id number]  Admission Date: 2/16/2024  Hospital Length of Stay: 3 days  Attending Provider: Bonifacio Shirley MD   Primary Care Physician: Boston Nevarez MD  Principal Problem:Preoperative clearance  Reason for Consult: Management of ESRD  Primary Nephrologist: Dr. Hi       Subjective:     HPI: 53 yo male with ESRD MWF [facility] admitted for heparin anticoagulation bridge for elective Urologic procedure, penile implant.  He dialyzes via LUE AVF with vintage almost 7 years. Last dialysis was yesterday and without event.No routine cramping and BP rarely drops.     2/19  - gross hematuria with flank pain, pain better controlled at my visit  - started dialysis but machine needed to be changed out             Review of patient's allergies indicates:  No Known Allergies  Current Facility-Administered Medications   Medication Frequency    acetaminophen suppository 650 mg Q4H PRN    acetaminophen tablet 650 mg Q6H PRN    aluminum-magnesium hydroxide-simethicone 200-200-20 mg/5 mL suspension 30 mL QID (AC & HS)    amLODIPine tablet 10 mg Daily    calcitRIOL capsule 0.5 mcg Every Mon, Wed, Fri    calcium acetate(phosphat bind) capsule 2,001 mg TID WM    cinacalcet tablet 30 mg Every Mon, Wed, Fri    dextrose 10% bolus 125 mL 125 mL PRN    dextrose 10% bolus 250 mL 250 mL PRN    diltiaZEM 24 hr capsule 420 mg Daily    glucagon (human recombinant) injection 1 mg PRN    glucose chewable tablet 16 g PRN    glucose chewable tablet 24 g PRN    hydrALAZINE tablet 100 mg TID    HYDROmorphone injection 1 mg Q4H PRN    hydrOXYzine HCL tablet 25 mg Daily PRN    labetaloL tablet 200 mg TID    LIDOcaine HCl 2% urojet Once    lisinopriL tablet 40 mg Daily    melatonin tablet 6 mg Nightly PRN    naloxone 0.4 mg/mL injection 0.02 mg PRN    ondansetron injection 4 mg Q8H PRN    oxyCODONE-acetaminophen 5-325 mg per tablet 2  tablet Q4H PRN    pantoprazole EC tablet 40 mg Daily    polyethylene glycol packet 17 g Daily PRN    promethazine tablet 25 mg Q6H PRN    simethicone chewable tablet 80 mg QID PRN    sodium chloride 0.9% bolus 250 mL 250 mL PRN    sodium chloride 0.9% flush 10 mL PRN    sucralfate 100 mg/mL suspension 1 g Q6H    tenofovir disoproxil fumarate tablet 300 mg Weekly    vitamin renal formula (B-complex-vitamin c-folic acid) 1 mg per capsule 1 capsule Daily     Facility-Administered Medications Ordered in Other Encounters   Medication Frequency    0.9%  NaCl infusion Continuous    sodium chloride 0.9% flush 10 mL PRN             Objective:     Vital Signs (Most Recent):  Temp: 98 °F (36.7 °C) (02/19/24 1126)  Pulse: 75 (02/19/24 1126)  Resp: 18 (02/19/24 1130)  BP: 131/60 (02/19/24 1126)  SpO2: (!) 92 % (02/19/24 1126) Vital Signs (24h Range):  Temp:  [97.6 °F (36.4 °C)-98 °F (36.7 °C)] 98 °F (36.7 °C)  Pulse:  [64-76] 75  Resp:  [16-18] 18  SpO2:  [92 %-99 %] 92 %  BP: (113-148)/(57-78) 131/60     Weight: 96.6 kg (212 lb 15.4 oz) (02/16/24 2143)  Body mass index is 27.34 kg/m².  Body surface area is 2.25 meters squared.    I/O last 3 completed shifts:  In: 1358.8 [P.O.:1100; I.V.:258.8]  Out: -     Physical Exam  Vitals and nursing note reviewed.   Constitutional:       General: He is not in acute distress.  HENT:      Head: Atraumatic.   Cardiovascular:      Rate and Rhythm: Normal rate.   Pulmonary:      Effort: Pulmonary effort is normal. No respiratory distress.      Breath sounds: No wheezing or rales.   Abdominal:      Palpations: Abdomen is soft.      Tenderness: There is no abdominal tenderness.   Musculoskeletal:      Right lower leg: No edema.      Left lower leg: No edema.   Skin:     General: Skin is warm and dry.   Neurological:      Mental Status: He is alert and oriented to person, place, and time.   Psychiatric:         Mood and Affect: Mood normal.         Significant Labs: reviewed labs, Hb reviewed,  trend noted     Assessment/Plan:     Active Diagnoses:    Diagnosis Date Noted POA    PRINCIPAL PROBLEM:  Preoperative clearance [Z01.818] 02/16/2024 Not Applicable    GERD (gastroesophageal reflux disease) [K21.9] 02/16/2024 Unknown    Chronic hepatitis B [B18.1] 12/06/2023 Yes    Hypertriglyceridemia [E78.1] 12/06/2023 Yes    Atrial fibrillation [I48.91] 03/27/2019 Yes    Chronic diastolic heart failure [I50.32] 03/11/2017 Yes    ESRD (end stage renal disease) on dialysis [N18.6, Z99.2]  Not Applicable     Chronic    History of mechanical aortic valve replacement [Z95.2] 02/09/2017 Not Applicable    Essential hypertension [I10]  Yes      Problems Resolved During this Admission:       ESRD MWF  - HD today    Gross Hematuria/Flank pain in PKD history   - heparin bridge held per Cardiology, s/p CT scan, monitoring H&H, Epogen today     HTN  - monitor on home meds    Secondary HPT  - resume calcitriol and Sensipar  - phos binders  - low phos diet    Nutrition  - low potassium diet  - dialysis vitamin     Anemia, multifactorial  - on Mircera as outpatient  - Epogen today as Hb < 11    D/w Urology and dialysis nurses     David Garcia MD  Nephrology  35 minutes of total time spent on the encounter, which includes face to face time and non-face to face time preparing to see the patient (eg, review of tests), Obtaining and/or reviewing separately obtained history, Documenting clinical information in the electronic or other health record, Independently interpreting results (not separately reported) and communicating results to the patient/family/caregiver, or Care coordination (not separately reported).

## 2024-02-19 NOTE — PROGRESS NOTES
[facility] - Telemetry ([facility])  Cardiology  Progress Note    Patient Name: Isidro White Jr.  MRN: [id number]  Admission Date: 2/16/2024  Hospital Length of Stay: 3 days  Code Status: Full Code   Attending Physician: Bonifacio Shirley MD   Primary Care Physician: Boston Nevarez MD  Expected Discharge Date:   Principal Problem:Preoperative clearance    Subjective:     HPI:   54 y.o. male pt with PAF, ASD closure at age 8 ([facility]), HFpEF, s/p AVR with a 22 mm mechanical valve and TV annuloplasty with a 28 mm ring 3/17, HTN, PHTN, Mitral stenosis, SVT, EP performed RFA (3/13/17) , ESRD presents to the hospital as a direct admit for bridging of coumadin with heparin to have penile prosthesis surgery performed by Urology (Dr. Shirley) on 02/20/2024. Patient denies CP, angina or anginal equivalent.    Hospital Course:   2/18/24- Surgery planned for Tuesday, currently bridging IV heparin , while holding coumadin. Pt without any complaints. BP is stable.     2/19/24: pt has right flank pain today, concerns for bleed on heparin gtt.  Urology ordering CT scan, working pt up.  Surgery  needs to be cancelled.        Review of Systems   Constitutional: Negative.   HENT: Negative.     Eyes: Negative.    Cardiovascular: Negative.    Respiratory: Negative.     Endocrine: Negative.    Hematologic/Lymphatic: Negative.    Skin: Negative.    Musculoskeletal: Negative.    Gastrointestinal:  Positive for abdominal pain.   Genitourinary:  Positive for hematuria.   Neurological: Negative.    Psychiatric/Behavioral: Negative.     Allergic/Immunologic: Negative.      Objective:     Vital Signs (Most Recent):  Temp: 98 °F (36.7 °C) (02/19/24 0312)  Pulse: 76 (02/19/24 0820)  Resp: 16 (02/19/24 0811)  BP: (!) 113/57 (02/19/24 0808)  SpO2: 98 % (02/19/24 0811) Vital Signs (24h Range):  Temp:  [97.5 °F (36.4 °C)-98 °F (36.7 °C)] 98 °F (36.7 °C)  Pulse:  [64-76] 76  Resp:  [14-18] 16  SpO2:  [93 %-99 %] 98 %  BP:  "(113-148)/(56-78) 113/57     Weight: 96.6 kg (212 lb 15.4 oz)  Body mass index is 27.34 kg/m².     SpO2: 98 %         Intake/Output Summary (Last 24 hours) at 2/19/2024 1007  Last data filed at 2/19/2024 0500  Gross per 24 hour   Intake 1219.6 ml   Output --   Net 1219.6 ml       Lines/Drains/Airways       Central Venous Catheter Line  Duration                  Hemodialysis Catheter 07/02/20 right internal jugular 1327 days         Hemodialysis AV Graft 07/04/20 0701 Left upper arm 1325 days              Peripheral Intravenous Line  Duration                  Peripheral IV - Single Lumen 02/16/24 0953 20 G Anterior;Right Forearm 3 days                       Physical Exam  Vitals and nursing note reviewed.   Constitutional:       Appearance: He is well-developed.   HENT:      Head: Normocephalic.   Neck:      Thyroid: No thyromegaly.      Vascular: No carotid bruit or JVD.   Cardiovascular:      Rate and Rhythm: Normal rate and regular rhythm.      Pulses:           Radial pulses are 2+ on the right side and 2+ on the left side.      Heart sounds: S1 normal and S2 normal. Heart sounds not distant. No midsystolic click and no opening snap. No murmur heard.     No friction rub. No S3 or S4 sounds.   Pulmonary:      Effort: Pulmonary effort is normal.      Breath sounds: Normal breath sounds. No wheezing or rales.   Abdominal:      General: Bowel sounds are decreased.      Palpations: Abdomen is soft.      Tenderness: There is abdominal tenderness in the right upper quadrant and right lower quadrant. There is guarding.   Musculoskeletal:      Cervical back: Neck supple.   Skin:     General: Skin is warm.   Neurological:      Mental Status: He is alert and oriented to person, place, and time.   Psychiatric:         Behavior: Behavior normal.            Significant Labs: ABG: No results for input(s): "PH", "PCO2", "HCO3", "POCSATURATED", "BE" in the last 48 hours., Blood Culture: No results for input(s): "LABBLOO" in the " "last 48 hours., BMP:   Recent Labs   Lab 02/19/24  0422   *      K 5.3*   CL 97   CO2 23   BUN 82*   CREATININE 17.7*   CALCIUM 9.8   , CMP   Recent Labs   Lab 02/19/24  0422      K 5.3*   CL 97   CO2 23   *   BUN 82*   CREATININE 17.7*   CALCIUM 9.8   ALBUMIN 3.2*   ANIONGAP 19*   , CBC   Recent Labs   Lab 02/18/24  0523 02/18/24  1720 02/19/24  0423   WBC 6.75 7.49 7.16   HGB 11.6* 12.0* 10.6*   HCT 34.5* 36.0* 32.5*   * 166 155   , INR   Recent Labs   Lab 02/18/24  1913   INR 1.3*   , Lipid Panel No results for input(s): "CHOL", "HDL", "LDLCALC", "TRIG", "CHOLHDL" in the last 48 hours., and Troponin No results for input(s): "TROPONINI" in the last 48 hours.    Significant Imaging: Echocardiogram: Transthoracic echo (TTE) complete (Cupid Only):   Results for orders placed or performed during the hospital encounter of 04/27/23   Echo   Result Value Ref Range    BSA 2.24 m2    LV LATERAL E/E' RATIO 19.50 m/s    LA WIDTH 3.50 cm    Left Ventricular Outflow Tract Mean Velocity 1.01 cm/s    Left Ventricular Outflow Tract Mean Gradient 4.38 mmHg    Pulmonary Valve Mean Velocity 0.77 m/s    TDI LATERAL 0.08 m/s    PV PEAK VELOCITY 1.25 cm/s    LVIDd 4.57 3.5 - 6.0 cm    IVS 1.45 (A) 0.6 - 1.1 cm    Posterior Wall 1.25 (A) 0.6 - 1.1 cm    Ao root annulus 3.89 cm    LVIDs 3.52 2.1 - 4.0 cm    FS 23 28 - 44 %    LA volume 81.61 cm3    Sinus 2.73 cm    STJ 2.84 cm    LV mass 240.88 g    LA size 4.53 cm    RVDD 2.69 cm    Left Ventricle Relative Wall Thickness 0.55 cm    AV mean gradient 19 mmHg    AV valve area 1.50 cm2    AV Velocity Ratio 0.44     AV index (prosthetic) 0.47     MV mean gradient 11 mmHg    MV valve area p 1/2 method 3.52 cm2    MV valve area by continuity eq 1.18 cm2    E/A ratio 0.79     E wave deceleration time 181.51 msec    LVOT diameter 2.02 cm    LVOT area 3.2 cm2    LVOT peak jennifer 1.33 m/s    LVOT peak VTI 27.70 cm    Ao peak jennifer 3.04 m/s    Ao VTI 59.3 cm    RVOT peak " nikita 0.79 m/s    RVOT peak VTI 17.4 cm    LVOT stroke volume 88.73 cm3    AV peak gradient 37 mmHg    MV peak gradient 18 mmHg    PV mean gradient 1.15 mmHg    MV Peak E Nikita 1.56 m/s    TR Max Nikita 3.33 m/s    MV VTI 75.5 cm    MV stenosis pressure 1/2 time 62.46 ms    MV Peak A Nikita 1.97 m/s    LV Systolic Volume 51.59 mL    LV Systolic Volume Index 23.1 mL/m2    LV Diastolic Volume 96.00 mL    LV Diastolic Volume Index 43.05 mL/m2    LA Volume Index 36.6 mL/m2    LV Mass Index 108 g/m2    RA Major Axis 4.46 cm    Left Atrium Minor Axis 6.42 cm    Left Atrium Major Axis 5.73 cm    Triscuspid Valve Regurgitation Peak Gradient 44 mmHg    LA Volume Index (Mod) 41.0 mL/m2    LA volume (mod) 91.32 cm3    RA Width 3.59 cm    Right Atrial Pressure (from IVC) 3 mmHg    EF 50 %    TV resting pulmonary artery pressure 47 mmHg    Narrative    · The left ventricle is normal in size with concentric remodeling and low   normal systolic function.  · Mild left atrial enlargement.  · Grade I left ventricular diastolic dysfunction.  · The estimated PA systolic pressure is 47 mmHg.  · Normal right ventricular size with normal right ventricular systolic   function.  · There is pulmonary hypertension.  · Normal central venous pressure (3 mmHg).  · There is a bileaflet tilting disc mechanical aortic valve present.   Prosthetic aortic valve is normal.  · The aortic valve mean gradient is 19 mmHg with a dimensionless index of   0.47.  · The estimated ejection fraction is 50%.  · The mean diastolic gradient across the mitral valve is 11 mmHg at a   heart rate of 76 bpm.  · There is mild to moderate mitral stenosis.  · Mild to moderate tricuspid regurgitation.        Assessment and Plan:     Recommend urological surgery be cancelled.  Work up flank pain, presumed hemorrhagic complication on heparin gtt.  Ok to hold heparin gtt temporarily until workup/tx completed.  Transfuse PRBC if needed.  Discussed w Hospital med and Urology.    *  Preoperative clearance  See management plan detailed above.      Atrial fibrillation  Monitor.    ESRD (end stage renal disease) on dialysis  Per Nephrology mgt.    History of mechanical aortic valve replacement   54 y.o. male pt with PAF, ASD closure at age 8 ([facility]), HFpEF, s/p AVR with a 22 mm mechanical valve and TV annuloplasty with a 28 mm ring 3/17, HTN, PHTN, Mitral stenosis, SVT, EP performed RFA (3/13/17) , ESRD presents to the hospital as a direct admit for bridging of coumadin with heparin to have penile prosthesis surgery performed by Urology (Dr. Shirley) on 02/20/2024. Patient denies CP, angina or anginal equivalent.    Post surgery heparin and coumadin to start once ok with surgeon.        VTE Risk Mitigation (From admission, onward)           Ordered     Reason for No Pharmacological VTE Prophylaxis  Once        Comments: Pending CoAg levels for heparin bridge   Question:  Reasons:  Answer:  Physician Provided (leave comment)    02/16/24 2116     Place FRANDY hose  Until discontinued         02/16/24 2018     IP VTE HIGH RISK PATIENT  Once         02/16/24 2018     Place sequential compression device  Until discontinued         02/16/24 2018                    Waldo Foreman MD  Cardiology  [facility] - Telemetry ([facility])

## 2024-02-19 NOTE — PROGRESS NOTES
02/19/24 1415   Required for all Hemodialysis Patients   Hepatitis Status positive   Handoff Report   Received From Leonel Lord RN   Given To Kwesi White RN   Treatment Type   Treatment Type Acute   Vital Signs   Temp 99.1 °F (37.3 °C)   Temp Source Axillary   Pulse 80   Heart Rate Source Monitor   Resp 20   SpO2 (!) 93 %   Pulse Oximetry Type Intermittent   Probe Placed On (Pulse Ox) Right:;finger   Device (Oxygen Therapy) room air   BP (!) 147/102   MAP (mmHg) 116   BP Location Right arm   BP Method Automatic   Patient Position Lying     Will prepare for dialysis tx.at bedside r/t HBsAG+ results.

## 2024-02-20 LAB
APTT PPP: 38 SEC (ref 21–32)
BASOPHILS # BLD AUTO: 0.02 K/UL (ref 0–0.2)
BASOPHILS NFR BLD: 0.2 % (ref 0–1.9)
DIFFERENTIAL METHOD BLD: ABNORMAL
EOSINOPHIL # BLD AUTO: 0.2 K/UL (ref 0–0.5)
EOSINOPHIL NFR BLD: 1.6 % (ref 0–8)
ERYTHROCYTE [DISTWIDTH] IN BLOOD BY AUTOMATED COUNT: 15.8 % (ref 11.5–14.5)
HCT VFR BLD AUTO: 34.7 % (ref 40–54)
HGB BLD-MCNC: 11.3 G/DL (ref 14–18)
IMM GRANULOCYTES # BLD AUTO: 0.02 K/UL (ref 0–0.04)
IMM GRANULOCYTES NFR BLD AUTO: 0.2 % (ref 0–0.5)
INR PPP: 1.2 (ref 0.8–1.2)
LYMPHOCYTES # BLD AUTO: 1.3 K/UL (ref 1–4.8)
LYMPHOCYTES NFR BLD: 13.6 % (ref 18–48)
MCH RBC QN AUTO: 34.1 PG (ref 27–31)
MCHC RBC AUTO-ENTMCNC: 32.6 G/DL (ref 32–36)
MCV RBC AUTO: 105 FL (ref 82–98)
MONOCYTES # BLD AUTO: 1.4 K/UL (ref 0.3–1)
MONOCYTES NFR BLD: 14.8 % (ref 4–15)
NEUTROPHILS # BLD AUTO: 6.6 K/UL (ref 1.8–7.7)
NEUTROPHILS NFR BLD: 69.6 % (ref 38–73)
NRBC BLD-RTO: 0 /100 WBC
PLATELET # BLD AUTO: 159 K/UL (ref 150–450)
PMV BLD AUTO: 10.9 FL (ref 9.2–12.9)
PROTHROMBIN TIME: 13.8 SEC (ref 9–12.5)
RBC # BLD AUTO: 3.31 M/UL (ref 4.6–6.2)
WBC # BLD AUTO: 9.42 K/UL (ref 3.9–12.7)

## 2024-02-20 PROCEDURE — 36415 COLL VENOUS BLD VENIPUNCTURE: CPT | Mod: XB | Performed by: PHYSICIAN ASSISTANT

## 2024-02-20 PROCEDURE — 99232 SBSQ HOSP IP/OBS MODERATE 35: CPT | Mod: ,,, | Performed by: INTERNAL MEDICINE

## 2024-02-20 PROCEDURE — 36415 COLL VENOUS BLD VENIPUNCTURE: CPT | Performed by: UROLOGY

## 2024-02-20 PROCEDURE — 85730 THROMBOPLASTIN TIME PARTIAL: CPT | Performed by: UROLOGY

## 2024-02-20 PROCEDURE — 99232 SBSQ HOSP IP/OBS MODERATE 35: CPT | Mod: ,,, | Performed by: UROLOGY

## 2024-02-20 PROCEDURE — 85025 COMPLETE CBC W/AUTO DIFF WBC: CPT | Performed by: PHYSICIAN ASSISTANT

## 2024-02-20 PROCEDURE — 63600175 PHARM REV CODE 636 W HCPCS: Performed by: UROLOGY

## 2024-02-20 PROCEDURE — 25000003 PHARM REV CODE 250: Performed by: NURSE PRACTITIONER

## 2024-02-20 PROCEDURE — 85610 PROTHROMBIN TIME: CPT | Performed by: UROLOGY

## 2024-02-20 PROCEDURE — 63600175 PHARM REV CODE 636 W HCPCS: Performed by: NURSE PRACTITIONER

## 2024-02-20 PROCEDURE — 25000003 PHARM REV CODE 250: Performed by: UROLOGY

## 2024-02-20 PROCEDURE — 21400001 HC TELEMETRY ROOM

## 2024-02-20 PROCEDURE — 99232 SBSQ HOSP IP/OBS MODERATE 35: CPT | Mod: ,,, | Performed by: PHYSICIAN ASSISTANT

## 2024-02-20 RX ORDER — ENOXAPARIN SODIUM 100 MG/ML
1 INJECTION SUBCUTANEOUS EVERY 12 HOURS
Status: DISCONTINUED | OUTPATIENT
Start: 2024-02-20 | End: 2024-02-20

## 2024-02-20 RX ORDER — ENOXAPARIN SODIUM 100 MG/ML
1 INJECTION SUBCUTANEOUS EVERY 24 HOURS
Status: DISCONTINUED | OUTPATIENT
Start: 2024-02-20 | End: 2024-02-22 | Stop reason: HOSPADM

## 2024-02-20 RX ORDER — HEPARIN SODIUM,PORCINE/D5W 25000/250
0-40 INTRAVENOUS SOLUTION INTRAVENOUS CONTINUOUS
Status: DISCONTINUED | OUTPATIENT
Start: 2024-02-20 | End: 2024-02-20

## 2024-02-20 RX ORDER — HYDROXYZINE PAMOATE 25 MG/1
25 CAPSULE ORAL ONCE
Status: COMPLETED | OUTPATIENT
Start: 2024-02-20 | End: 2024-02-20

## 2024-02-20 RX ORDER — MUPIROCIN 20 MG/G
OINTMENT TOPICAL 2 TIMES DAILY
Status: DISCONTINUED | OUTPATIENT
Start: 2024-02-20 | End: 2024-02-22 | Stop reason: HOSPADM

## 2024-02-20 RX ORDER — HYDROXYZINE HYDROCHLORIDE 25 MG/1
25 TABLET, FILM COATED ORAL EVERY 8 HOURS PRN
Status: DISCONTINUED | OUTPATIENT
Start: 2024-02-20 | End: 2024-02-22 | Stop reason: HOSPADM

## 2024-02-20 RX ADMIN — WARFARIN SODIUM 5 MG: 2.5 TABLET ORAL at 04:02

## 2024-02-20 RX ADMIN — SUCRALFATE ORAL 1 G: 1 SUSPENSION ORAL at 11:02

## 2024-02-20 RX ADMIN — HYDROXYZINE HYDROCHLORIDE 25 MG: 25 TABLET ORAL at 08:02

## 2024-02-20 RX ADMIN — HYDROXYZINE PAMOATE 25 MG: 25 CAPSULE ORAL at 12:02

## 2024-02-20 RX ADMIN — AMLODIPINE BESYLATE 10 MG: 10 TABLET ORAL at 08:02

## 2024-02-20 RX ADMIN — LABETALOL HYDROCHLORIDE 200 MG: 200 TABLET, FILM COATED ORAL at 04:02

## 2024-02-20 RX ADMIN — LISINOPRIL 40 MG: 20 TABLET ORAL at 08:02

## 2024-02-20 RX ADMIN — CALCIUM ACETATE 2001 MG: 667 CAPSULE ORAL at 11:02

## 2024-02-20 RX ADMIN — ENOXAPARIN SODIUM 100 MG: 100 INJECTION SUBCUTANEOUS at 12:02

## 2024-02-20 RX ADMIN — MUPIROCIN: 20 OINTMENT TOPICAL at 08:02

## 2024-02-20 RX ADMIN — MUPIROCIN: 20 OINTMENT TOPICAL at 12:02

## 2024-02-20 RX ADMIN — PANTOPRAZOLE SODIUM 40 MG: 40 TABLET, DELAYED RELEASE ORAL at 08:02

## 2024-02-20 RX ADMIN — ALUMINUM HYDROXIDE, MAGNESIUM HYDROXIDE, AND DIMETHICONE 30 ML: 200; 20; 200 SUSPENSION ORAL at 08:02

## 2024-02-20 RX ADMIN — SUCRALFATE ORAL 1 G: 1 SUSPENSION ORAL at 06:02

## 2024-02-20 RX ADMIN — HYDRALAZINE HYDROCHLORIDE 100 MG: 50 TABLET ORAL at 04:02

## 2024-02-20 RX ADMIN — Medication 6 MG: at 08:02

## 2024-02-20 RX ADMIN — CALCIUM ACETATE 2001 MG: 667 CAPSULE ORAL at 04:02

## 2024-02-20 RX ADMIN — ALUMINUM HYDROXIDE, MAGNESIUM HYDROXIDE, AND DIMETHICONE 30 ML: 200; 20; 200 SUSPENSION ORAL at 11:02

## 2024-02-20 RX ADMIN — LABETALOL HYDROCHLORIDE 200 MG: 200 TABLET, FILM COATED ORAL at 08:02

## 2024-02-20 RX ADMIN — HYDRALAZINE HYDROCHLORIDE 100 MG: 50 TABLET ORAL at 08:02

## 2024-02-20 RX ADMIN — ONDANSETRON 4 MG: 2 INJECTION INTRAMUSCULAR; INTRAVENOUS at 12:02

## 2024-02-20 RX ADMIN — ALUMINUM HYDROXIDE, MAGNESIUM HYDROXIDE, AND DIMETHICONE 30 ML: 200; 20; 200 SUSPENSION ORAL at 04:02

## 2024-02-20 RX ADMIN — DILTIAZEM HYDROCHLORIDE 420 MG: 240 CAPSULE, COATED, EXTENDED RELEASE ORAL at 08:02

## 2024-02-20 RX ADMIN — HYDROMORPHONE HYDROCHLORIDE 1 MG: 1 INJECTION, SOLUTION INTRAMUSCULAR; INTRAVENOUS; SUBCUTANEOUS at 08:02

## 2024-02-20 RX ADMIN — CALCIUM ACETATE 2001 MG: 667 CAPSULE ORAL at 08:02

## 2024-02-20 RX ADMIN — NEPHROCAP 1 CAPSULE: 1 CAP ORAL at 08:02

## 2024-02-20 RX ADMIN — SUCRALFATE ORAL 1 G: 1 SUSPENSION ORAL at 12:02

## 2024-02-20 NOTE — ASSESSMENT & PLAN NOTE
54 y.o. male pt with PAF, ASD closure at age 8 ([facility]), HFpEF, s/p AVR with a 22 mm mechanical valve and TV annuloplasty with a 28 mm ring 3/17, HTN, PHTN, Mitral stenosis, SVT, EP performed RFA (3/13/17) , ESRD presents to the hospital as a direct admit for bridging of coumadin with heparin to have penile prosthesis surgery performed by Urology (Dr. Shirley) on 02/20/2024. Patient denies CP, angina or anginal equivalent.    Post surgery heparin and coumadin to start once ok with surgeon.    2/20/24  -INR 1.2 today  -CT from yesterday reviewed, + bladder clot with slightly increased hydronephrosis within the right kidney with high density thought to reflect blood products.  -Abd pain improved this AM  -Recommend resumption of Coumadin this AM and resumption of heparin bridge tmw, will discuss with primary team

## 2024-02-20 NOTE — PROGRESS NOTES
Pharmacy Consult Note: Warfarin     Isidro White JrKatya 's Coumadin will be dosed and monitored by Pharmacy.      Target INR goal is *2-3.    INR   Date Value Ref Range Status   02/20/2024 1.2 0.8 - 1.2 Final     Comment:     Coumadin Therapy:  2.0 - 3.0 for INR for all indicators except mechanical heart valves  and antiphospholipid syndromes which should use 2.5 - 3.5.         Indication: afib with MAVR  Home dose: 7.5 mg  MWF, 11.25 mg all other days  Patient will be continued on home dose today.    Dose for Today: 7.5 mg    Drug interactions:   APAP, norco melatonin and heparin bridge all increase the anticoagulant effect  Succralfate decreases the anticoagulant effect    PT/INR will be monitored daily. Dose adjustments will be made accordingly.      Thank you for allowing us to participate in this patient's care.     Mindy Obrien, PharmD 2/20/2024 10:28 AM

## 2024-02-20 NOTE — SUBJECTIVE & OBJECTIVE
Review of Systems   Constitutional: Positive for malaise/fatigue.   HENT: Negative.     Eyes: Negative.    Cardiovascular: Negative.    Respiratory: Negative.     Endocrine: Negative.    Hematologic/Lymphatic: Negative.    Skin: Negative.    Musculoskeletal: Negative.    Genitourinary: Negative.    Neurological: Negative.    Psychiatric/Behavioral: Negative.     Allergic/Immunologic: Negative.      Objective:     Vital Signs (Most Recent):  Temp: 97.9 °F (36.6 °C) (02/20/24 1250)  Pulse: 76 (02/20/24 1250)  Resp: 18 (02/20/24 1250)  BP: (!) 117/50 (02/20/24 1250)  SpO2: 97 % (02/20/24 1250) Vital Signs (24h Range):  Temp:  [97.9 °F (36.6 °C)-99.1 °F (37.3 °C)] 97.9 °F (36.6 °C)  Pulse:  [61-89] 76  Resp:  [16-20] 18  SpO2:  [93 %-100 %] 97 %  BP: (110-150)/() 117/50     Weight: 98.2 kg (216 lb 7.9 oz)  Body mass index is 27.8 kg/m².     SpO2: 97 %         Intake/Output Summary (Last 24 hours) at 2/20/2024 1421  Last data filed at 2/19/2024 2100  Gross per 24 hour   Intake 550 ml   Output 3502 ml   Net -2952 ml       Lines/Drains/Airways       Central Venous Catheter Line  Duration                  Hemodialysis AV Graft 07/04/20 0701 Left upper arm 1326 days              Peripheral Intravenous Line  Duration                  Peripheral IV - Single Lumen 02/16/24 0953 20 G Anterior;Right Forearm 4 days                       Physical Exam  Vitals and nursing note reviewed.   Constitutional:       General: He is not in acute distress.     Appearance: Normal appearance. He is well-developed. He is not diaphoretic.   HENT:      Head: Normocephalic and atraumatic.   Eyes:      General:         Right eye: No discharge.         Left eye: No discharge.      Pupils: Pupils are equal, round, and reactive to light.   Cardiovascular:      Rate and Rhythm: Normal rate and regular rhythm.      Heart sounds: Normal heart sounds, S1 normal and S2 normal. No murmur heard.     Comments: +metallic click  Pulmonary:       "Effort: Pulmonary effort is normal. No respiratory distress.      Breath sounds: Normal breath sounds.   Abdominal:      General: There is no distension.      Tenderness: There is no abdominal tenderness.   Musculoskeletal:      Right lower leg: No edema.      Left lower leg: No edema.   Skin:     General: Skin is warm and dry.      Findings: No erythema.   Neurological:      General: No focal deficit present.      Mental Status: He is alert and oriented to person, place, and time.   Psychiatric:         Mood and Affect: Mood normal.         Behavior: Behavior normal.            Significant Labs: CMP   Recent Labs   Lab 02/19/24  0422      K 5.3*   CL 97   CO2 23   *   BUN 82*   CREATININE 17.7*   CALCIUM 9.8   ALBUMIN 3.2*   ANIONGAP 19*   , CBC   Recent Labs   Lab 02/18/24  1720 02/19/24  0423 02/19/24  1425   WBC 7.49 7.16 8.94   HGB 12.0* 10.6* 10.2*   HCT 36.0* 32.5* 30.4*    155 161   , INR   Recent Labs   Lab 02/18/24  1913 02/20/24  0837   INR 1.3* 1.2   , and Troponin No results for input(s): "TROPONINI" in the last 48 hours.    Significant Imaging: EKG: Reviewed  "

## 2024-02-20 NOTE — PROGRESS NOTES
Patient refuses heparin drip, compliant with lovenox and will initiate at 1 mg/kg sq q12hrs.  Continue to monitor INR and once stable will d/c home.

## 2024-02-20 NOTE — PROGRESS NOTES
Pharmacist Renal Dose Adjustment Note    Isidro White Jr. is a 54 y.o. male being treated with the medication Lovenox    Patient Data:    Vital Signs (Most Recent):  Temp: 98.4 °F (36.9 °C) (02/20/24 0742)  Pulse: 79 (02/20/24 0813)  Resp: 18 (02/20/24 0742)  BP: 121/68 (02/20/24 0742)  SpO2: 95 % (02/20/24 0742) Vital Signs (72h Range):  Temp:  [97.5 °F (36.4 °C)-99.1 °F (37.3 °C)]   Pulse:  [61-89]   Resp:  [14-20]   BP: (105-150)/()   SpO2:  [92 %-100 %]      Recent Labs   Lab 02/16/24 2123 02/19/24 0422   CREATININE 11.4* 17.7*     Serum creatinine: 17.7 mg/dL (H) 02/19/24 0422  Estimated creatinine clearance: 5.6 mL/min (A)     Lovenox 100 mg SC q12H changed to q24H per renal policy     Pharmacist's Name: Werner Mercer  Pharmacist's Extension: [phone]

## 2024-02-20 NOTE — PLAN OF CARE
-VSS  -HD completed at the start of shift. 3L reported removed  -AO x4. Ambulates well. Can be irritable at times  -1 episode of anxiety, followed by projectile vomiting. Hospital medicine team made aware. 1 dose Vistaril given. 1 dose Zofran given. No issues since and pt rested comfortably for rest of shift and stated he felt much better  -1 dose PO Percocet given for pain  -Pt refusing labs this am. Educated on the importance of getting labs r/t HD, as well as the importance with upcoming surgery and recent bleeding episode but still refusing phlebotomist.  CN made aware.  -Call light in reach  -NPO since midnight  -CHG wipes given for self care as he refused bath at this time

## 2024-02-20 NOTE — PROGRESS NOTES
Chief Complaint:  Erectile dysfunction, PAF, ASD, status post AVR repair, mitral stenosis, dialysis dependent, here for Coumadin bridge    HPI:   2/20/24- patient improved today, occasional hematuria still.  2/19/24- persistent and worsening pain on the right flank, hematuria continues.    2/17/24-  54-year-old gentleman well known to the service, admitted preoperatively for Coumadin bridge for placement of penile prosthesis.      Allergies:  Patient has no known allergies.    Medications:  See MAR    Review of Systems:  General: No fever, chills, fatigability, or weight loss.  Skin: No rashes, itching, or changes in color or texture of skin.  Chest: Denies HAY, cyanosis, wheezing, cough, and sputum production.  Abdomen: Appetite fine. No weight loss. Denies diarrhea, abdominal pain, hematemesis, or blood in stool.  Musculoskeletal: No joint stiffness or swelling. Denies back pain.  : As above.  All other review of systems negative.    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    PSH:   has a past surgical history that includes ASD repair; Cardiac valuve replacement (02/08/2017); Radiofrequency ablation (03/13/2017); Cardiac catheterization; AV Graft Creation (Left, 03/2017); Colonoscopy (N/A, 8/27/2019); Esophagogastroduodenoscopy (N/A, 8/27/2019); Removal of graft (Left, 7/2/2020); Colonoscopy (N/A, 9/3/2020); and Right heart catheterization (Right, 8/12/2021).    FamHx: family history includes Anesthesia problems in his paternal uncle; Cancer in his mother;  Diabetes in his paternal aunt and paternal aunt; Heart attack in his father; Heart failure in his paternal grandmother; Hypertension in his paternal aunt; Leukemia in his mother and paternal aunt; No Known Problems in his brother, sister, sister, and sister; Stroke in his paternal aunt; Suicide in his paternal uncle; Valvular heart disease in his maternal aunt.    SocHx:  reports that he has never smoked. He has never used smokeless tobacco. He reports that he does not drink alcohol and does not use drugs.      Physical Exam:  Vitals:    02/20/24 0742   BP: 121/68   Pulse: 82   Resp: 18   Temp: 98.4 °F (36.9 °C)     General: A&Ox3, no apparent distress, no deformities  Neck: No masses, normal ROM  Lungs: normal inspiration, no use of accessory muscles  Heart: normal pulse, no arrhythmias  Abdomen: Soft, ND, acute pain to right flank.  Skin: The skin is warm and dry. No jaundice.  Ext: No c/c/e.    Labs/Studies:   Pending     Impression/Plan:     1. History of mechanical aortic valve replacement- cardiology on board, heparin drip stopped.  Coumadin restarted, it has been held since 02/15/2024.    2. Chronic diastolic heart failure- CHF controlled, cardiology on board.    3. Essential hypertension- blood pressure stable, appreciate cardiology in hospital medicine assistance.    4. End-stage renal disease- nephrology consulted, patient is a F dialysis patient, dialysis yesterday.    5. Atrial fibrillation- control with beta-blocker and calcium channel blocker, current Coumadin bridge for anticoagulation.    6. Erectile dysfunction- have held IPP for now due to hematuria and flank pain, improved for now.    7. Gross hematuria- stable for now, hold on surgery.

## 2024-02-20 NOTE — PROGRESS NOTES
02/19/24 1830   Required for all Hemodialysis Patients   Hepatitis Status positive   Handoff Report   Received From Kwesi White RN   Given To Leonel Lord RN   Treatment Type   Treatment Type Acute   Vital Signs   Temp 98.5 °F (36.9 °C)   Temp Source Axillary   Pulse 80   Heart Rate Source Monitor   Resp 18   SpO2 98 %   Pulse Oximetry Type Intermittent   Probe Placed On (Pulse Ox) Right:;finger   Device (Oxygen Therapy) room air   BP (!) 140/106   MAP (mmHg) 112   BP Location Right arm   BP Method Automatic   Patient Position Lying        Hemodialysis AV Graft 07/04/20 0701 Left upper arm   Placement Date/Time: 07/04/20 0701   Location: Left upper arm   Status Deaccessed   Post-Hemodialysis Assessment   Rinseback Volume (mL) 250 mL   Blood Volume Processed (Liters) 56 L   Dialyzer Clearance Heavily streaked   Duration of Treatment 180 minutes   Additional Fluid Intake (mL) 500 mL   Total UF (mL) 3502 mL   Net Fluid Removal 3002   Patient Response to Treatment tolerated   Post-Hemodialysis Comments 3 hrs of I-HDTx completed as planned, no issues during or with tx today. vsCAROLINE vinson. Blood reperfused and pt de accessed according to P&P.. RTF with primary RN.

## 2024-02-20 NOTE — PROGRESS NOTES
[facility] ([facility])  Cardiology  Progress Note    Patient Name: Isidro White Jr.  MRN: [id number]  Admission Date: 2/16/2024  Hospital Length of Stay: 4 days  Code Status: Full Code   Attending Physician: Bonifacio Shirley MD   Primary Care Physician: Boston Nevarez MD  Expected Discharge Date:   Principal Problem:Preoperative clearance    Subjective:   HPI:  54 y.o. male pt with PAF, ASD closure at age 8 ([facility]), HFpEF, s/p AVR with a 22 mm mechanical valve and TV annuloplasty with a 28 mm ring 3/17, HTN, PHTN, Mitral stenosis, SVT, EP performed RFA (3/13/17) , ESRD presents to the hospital as a direct admit for bridging of coumadin with heparin to have penile prosthesis surgery performed by Urology (Dr. Shirley) on 02/20/2024. Patient denies CP, angina or anginal equivalent.     Hospital Course:   2/18/24- Surgery planned for Tuesday, currently bridging IV heparin , while holding coumadin. Pt without any complaints. BP is stable.     2/19/24: pt has right flank pain today, concerns for bleed on heparin gtt.  Urology ordering CT scan, working pt up.  Surgery  needs to be cancelled.    2/20/24-Patient seen and examined today, resting in bed. Feeling better. Abdominal pain/soreness improved. CT yesterday with blood clot within urinary bladder, slightly increased hydronephrosis within the right kidney with high density thought to reflect blood products. Urology following, no intervention planned, will monitor. INR 1.2, needs to resume heparin bridge tmw. Will discuss resumption of Coumadin today.        Review of Systems   Constitutional: Positive for malaise/fatigue.   HENT: Negative.     Eyes: Negative.    Cardiovascular: Negative.    Respiratory: Negative.     Endocrine: Negative.    Hematologic/Lymphatic: Negative.    Skin: Negative.    Musculoskeletal: Negative.    Genitourinary: Negative.    Neurological: Negative.    Psychiatric/Behavioral: Negative.     Allergic/Immunologic:  Negative.      Objective:     Vital Signs (Most Recent):  Temp: 97.9 °F (36.6 °C) (02/20/24 1250)  Pulse: 76 (02/20/24 1250)  Resp: 18 (02/20/24 1250)  BP: (!) 117/50 (02/20/24 1250)  SpO2: 97 % (02/20/24 1250) Vital Signs (24h Range):  Temp:  [97.9 °F (36.6 °C)-99.1 °F (37.3 °C)] 97.9 °F (36.6 °C)  Pulse:  [61-89] 76  Resp:  [16-20] 18  SpO2:  [93 %-100 %] 97 %  BP: (110-150)/() 117/50     Weight: 98.2 kg (216 lb 7.9 oz)  Body mass index is 27.8 kg/m².     SpO2: 97 %         Intake/Output Summary (Last 24 hours) at 2/20/2024 1421  Last data filed at 2/19/2024 2100  Gross per 24 hour   Intake 550 ml   Output 3502 ml   Net -2952 ml       Lines/Drains/Airways       Central Venous Catheter Line  Duration                  Hemodialysis AV Graft 07/04/20 0701 Left upper arm 1326 days              Peripheral Intravenous Line  Duration                  Peripheral IV - Single Lumen 02/16/24 0953 20 G Anterior;Right Forearm 4 days                       Physical Exam  Vitals and nursing note reviewed.   Constitutional:       General: He is not in acute distress.     Appearance: Normal appearance. He is well-developed. He is not diaphoretic.   HENT:      Head: Normocephalic and atraumatic.   Eyes:      General:         Right eye: No discharge.         Left eye: No discharge.      Pupils: Pupils are equal, round, and reactive to light.   Cardiovascular:      Rate and Rhythm: Normal rate and regular rhythm.      Heart sounds: Normal heart sounds, S1 normal and S2 normal. No murmur heard.     Comments: +metallic click  Pulmonary:      Effort: Pulmonary effort is normal. No respiratory distress.      Breath sounds: Normal breath sounds.   Abdominal:      General: There is no distension.      Tenderness: There is no abdominal tenderness.   Musculoskeletal:      Right lower leg: No edema.      Left lower leg: No edema.   Skin:     General: Skin is warm and dry.      Findings: No erythema.   Neurological:      General: No focal  "deficit present.      Mental Status: He is alert and oriented to person, place, and time.   Psychiatric:         Mood and Affect: Mood normal.         Behavior: Behavior normal.            Significant Labs: CMP   Recent Labs   Lab 02/19/24  0422      K 5.3*   CL 97   CO2 23   *   BUN 82*   CREATININE 17.7*   CALCIUM 9.8   ALBUMIN 3.2*   ANIONGAP 19*   , CBC   Recent Labs   Lab 02/18/24  1720 02/19/24  0423 02/19/24  1425   WBC 7.49 7.16 8.94   HGB 12.0* 10.6* 10.2*   HCT 36.0* 32.5* 30.4*    155 161   , INR   Recent Labs   Lab 02/18/24  1913 02/20/24  0837   INR 1.3* 1.2   , and Troponin No results for input(s): "TROPONINI" in the last 48 hours.    Significant Imaging: EKG: Reviewed  Assessment and Plan:   Patient remains stable CV wise. Recommend resumption of Coumadin tonight and heparin drip tomorrow as bridge given mechanical AVR.    * Preoperative clearance  See management plan detailed above.      Atrial fibrillation  Monitor.    ESRD (end stage renal disease) on dialysis  Per Nephrology mgt.    History of mechanical aortic valve replacement   54 y.o. male pt with PAF, ASD closure at age 8 ([facility]), HFpEF, s/p AVR with a 22 mm mechanical valve and TV annuloplasty with a 28 mm ring 3/17, HTN, PHTN, Mitral stenosis, SVT, EP performed RFA (3/13/17) , ESRD presents to the hospital as a direct admit for bridging of coumadin with heparin to have penile prosthesis surgery performed by Urology (Dr. Shirley) on 02/20/2024. Patient denies CP, angina or anginal equivalent.    Post surgery heparin and coumadin to start once ok with surgeon.    2/20/24  -INR 1.2 today  -CT from yesterday reviewed, + bladder clot with slightly increased hydronephrosis within the right kidney with high density thought to reflect blood products.  -Abd pain improved this AM  -Recommend resumption of Coumadin this AM and resumption of heparin bridge tmw, will discuss with primary team          VTE Risk Mitigation " (From admission, onward)           Ordered     warfarin tablet 7.5 mg  Once per day on Mon Wed Fri 02/20/24 1015     warfarin split tablet 11.25 mg  Every Tues, Thurs, Sat, Sun         02/20/24 1015     enoxaparin injection 100 mg  Every 24 hours         02/20/24 1155     Reason for No Pharmacological VTE Prophylaxis  Once        Comments: Pending CoAg levels for heparin bridge   Question:  Reasons:  Answer:  Physician Provided (leave comment)    02/16/24 2116     Place FRANDY hose  Until discontinued         02/16/24 2018     IP VTE HIGH RISK PATIENT  Once         02/16/24 2018     Place sequential compression device  Until discontinued         02/16/24 2018                    Radha Slaughter PA-C  Cardiology  [facility] - Telemetry ([facility])     No

## 2024-02-20 NOTE — PROGRESS NOTES
[facility] Medicine  Progress Note    Patient Name: Isidro White Jr.  MRN: [id number]  Patient Class: IP- Inpatient   Admission Date: 2/16/2024  Length of Stay: 4 days  Attending Physician: Bonifacio Shirley MD  Primary Care Provider: Boston Nevarez MD        Subjective:     Principal Problem:Preoperative clearance        HPI:  Isidro White Jr. is a 54 y.o. 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 Presents to the hospital as a direct admit for bridging of coumadin with heparin to have penile prosthesis surgery performed by Urology (Dr. Shirley) on 02/20/2024.  Patient receives HD on M/W/F and states he received his full dialysis session prior to arrival to the hospital.  States he does not produce urine. Reports last dose of Coumadin was yesterday.  Denies any complaints at this time.  Hospital Medicine consulted to manage comorbidities/medical problems and to manage heparin drip.    PCP: Boston Nevarez      Overview/Hospital Course:  Isidro White Jr. is a 54 y.o. male with a PMH  has a past medical history of Anemia, Aortic valve stenosis, Atrial fibrillation, Atrial flutter, Cardiomyopathy, CHF (congestive heart failure), Drug-induced erectile dysfunction, ESRD due to hypertension, ESRD on dialysis, GERD (gastroesophageal reflux disease), Hepatitis B, Hyperlipidemia, Hypertension, Nightmares, Obesity, DANIEL (obstructive sleep apnea) (11/12/2019), Secondary hyperparathyroidism of renal origin,  Supraventricular tachycardia, Tachycardia, and Valvular regurgitation. Presents to the hospital as a direct admit for bridging of coumadin with heparin to have penile prosthesis surgery performed by Urology (Dr. Shirley) on 02/20/2024.  Patient receives HD on M/W/F and states he received his full dialysis session prior to arrival to the hospital.  States he does not produce urine. Reports last dose of Coumadin was yesterday.  Denies any complaints at this time.  Hospital Medicine consulted to manage comorbidities/medical problems and to manage heparin drip.    02/19/2024   Patient initially presented to undergo elective penile prosthesis implantation, Urology primary on patient, hospital medicine consulted for medical management.    Patient on Coumadin outpatient for mechanical aortic valve prosthesis, bridged to heparin drip for urological intervention.    On 02/19/2024, patient continued to have persistent hematuria with drop in hemoglobin, heparin drip held per Cardiology, Urology plans to postpone penile prosthesis implantation for now, plans for clot evacuation tomorrow.  Follow up on stat CT abdomen pelvis.  Follow up on repeat CBC this afternoon.      02/20/2024   Initially urology plan for clot evacuation, however patient noted to have improvement in hematuria, no plans for clot evacuation at this point, resumed diet per Urology.    Given recent hematuria, Urology plans to hold penile prosthesis implantation at this point.    Plans to bridge heparin to Coumadin, however patient refused heparin drip, Lovenox initiated per Urology   Monitor labs        Review of Systems  Objective:     Vital Signs (Most Recent):  Temp: 98.4 °F (36.9 °C) (02/20/24 0742)  Pulse: 79 (02/20/24 0813)  Resp: 18 (02/20/24 0742)  BP: 121/68 (02/20/24 0742)  SpO2: 95 % (02/20/24 0742) Vital Signs (24h Range):  Temp:  [97.9 °F (36.6 °C)-99.1 °F (37.3 °C)] 98.4 °F (36.9 °C)  Pulse:  [61-89] 79  Resp:  [16-20] 18  SpO2:  [93 %-100 %] 95  %  BP: (110-150)/() 121/68     Weight: 98.2 kg (216 lb 7.9 oz)  Body mass index is 27.8 kg/m².    Intake/Output Summary (Last 24 hours) at 2/20/2024 1248  Last data filed at 2/19/2024 2100  Gross per 24 hour   Intake 550 ml   Output 3502 ml   Net -2952 ml         Physical Exam         Constitutional:       General: He is awake. He is not in acute distress.     Appearance: Normal appearance. He is well-developed and well-groomed. He is not ill-appearing, toxic-appearing or diaphoretic.   HENT:      Head: Normocephalic and atraumatic.   Eyes:      Extraocular Movements: Extraocular movements intact.      Conjunctiva/sclera: Conjunctivae normal.   Cardiovascular:      Rate and Rhythm: Normal rate and regular rhythm.      Pulses: Normal pulses.      Heart sounds: Murmur heard.      Arteriovenous access: Left arteriovenous access is present.     Comments: +bruit and thrill noted to LLE.  Pulmonary:      Effort: Pulmonary effort is normal.      Breath sounds: Normal breath sounds. No decreased breath sounds, wheezing, rhonchi or rales.   Abdominal:      General: Bowel sounds are normal.      Palpations: Abdomen is soft.      Tenderness: There is no abdominal tenderness.   Musculoskeletal:      Cervical back: Normal range of motion and neck supple.      Right lower leg: No edema.      Left lower leg: No edema.      Comments: 5/5 strength throughout   Skin:     General: Skin is warm and dry.      Capillary Refill: Capillary refill takes less than 2 seconds.   Neurological:      General: No focal deficit present.      Mental Status: He is alert and oriented to person, place, and time. Mental status is at baseline.      GCS: GCS eye subscore is 4. GCS verbal subscore is 5. GCS motor subscore is 6.      Cranial Nerves: Cranial nerves 2-12 are intact.      Motor: Motor function is intact.   Psychiatric:         Mood and Affect: Mood normal.         Behavior: Behavior normal. Behavior is cooperative.            Significant  Labs: All pertinent labs within the past 24 hours have been reviewed.  CBC:   Recent Labs   Lab 02/18/24  1720 02/19/24  0423 02/19/24  1425   WBC 7.49 7.16 8.94   HGB 12.0* 10.6* 10.2*   HCT 36.0* 32.5* 30.4*    155 161     CMP:   Recent Labs   Lab 02/19/24  0422      K 5.3*   CL 97   CO2 23   *   BUN 82*   CREATININE 17.7*   CALCIUM 9.8   ALBUMIN 3.2*   ANIONGAP 19*       Significant Imaging:          Assessment/Plan:      * Preoperative clearance  Admitted for heparin bridge from coumadin for scheduled penile prosthesis surgery to be performed by Dr. Shirley.  RCRI revealed class 4 risk with 15.0% 30 day risk of death, mi, cardiac arrest.  Plan:  -pre-op labs and imaging  -heparin bridge with nomogram  -manage co-morbidities   -cards consult for surgical clearance  -urology primary team    GERD (gastroesophageal reflux disease)  Chronic. Stable. Currently asymptomatic. Home medications include PPI/Antacids as needed.  Plan:  -Continue PPI/Antacids as needed         Hypertriglyceridemia  Patient is not chronically on statin.will not continue for now. Last Lipid Panel:   Lab Results   Component Value Date    CHOL 248 (H) 11/05/2020    HDL 42 11/05/2020    LDLCALC 164.8 (H) 11/05/2020    TRIG 206 (H) 11/05/2020    CHOLHDL 16.9 (L) 11/05/2020     Plan:  -Continue home medication  -low fat/low calorie diet        Chronic hepatitis B  Patient has chronic liver disease due to hepatitis b. Their liver disease is compensated. Hepatology has not been consulted. We will obtain daily CBC, CMP, and INR.Will continue Tenofovir. Their most current Na-MELD is listed below.  MELD 3.0: 24 at 10/10/2023  9:27 AM  MELD-Na: 26 at 10/10/2023  9:27 AM  Calculated from:  Serum Creatinine: On dialysis. Using the maximum value.  Serum Sodium: 143 mmol/L (Using max of 137 mmol/L) at 10/10/2023  8:31 AM  Total Bilirubin: 0.5 mg/dL (Using min of 1 mg/dL) at 10/10/2023  8:31 AM  Serum Albumin: 3.3 g/dL at 10/10/2023  8:31  AM  INR(ratio): 1.8 at 10/10/2023  9:27 AM  Age at listing (hypothetical): 53 years  Sex: Male at 10/10/2023  9:27 AM        Atrial fibrillation  Patient with Paroxysmal (<7 days) atrial fibrillation which is controlled currently with Beta Blocker and Calcium Channel Blocker. Patient is currently in atrial fibrillation.[id number] Score: The patient doesn't have any registry metric data available. HASBLED Score: . Anticoagulation indicated. Anticoagulation done with coumadin, but bridging with heparin for scheduled surgical procedure .    Chronic diastolic heart failure  Patient is identified as having Diastolic (HFpEF) heart failure that is Chronic. CHF is currently controlled. Latest ECHO performed and demonstrates- Results for orders placed during the hospital encounter of 04/27/23    Echo    Interpretation Summary  · The left ventricle is normal in size with concentric remodeling and low normal systolic function.  · Mild left atrial enlargement.  · Grade I left ventricular diastolic dysfunction.  · The estimated PA systolic pressure is 47 mmHg.  · Normal right ventricular size with normal right ventricular systolic function.  · There is pulmonary hypertension.  · Normal central venous pressure (3 mmHg).  · There is a bileaflet tilting disc mechanical aortic valve present. Prosthetic aortic valve is normal.  · The aortic valve mean gradient is 19 mmHg with a dimensionless index of 0.47.  · The estimated ejection fraction is 50%.  · The mean diastolic gradient across the mitral valve is 11 mmHg at a heart rate of 76 bpm.  · There is mild to moderate mitral stenosis.  · Mild to moderate tricuspid regurgitation.  . Continue Beta Blocker and monitor clinical status closely. Monitor on telemetry. Patient is off CHF pathway.  Monitor strict Is&Os and daily weights.  Place on fluid restriction of 1.5 L. Continue to stress to patient importance of self efficacy and  on diet for CHF. Last BNP reviewed- and noted  "below No results for input(s): "BNP", "BNPTRIAGEBLO" in the last 168 hours..            ESRD (end stage renal disease) on dialysis  Creatine stable for now. BMP reviewed- noted Estimated Creatinine Clearance: 8.7 mL/min (A) (based on SCr of 11.4 mg/dL (H)). according to latest data. Based on current GFR, CKD stage is end stage.  Monitor UOP and serial BMP and adjust therapy as needed. Renally dose meds. Avoid nephrotoxic medications and procedures. Nephrology consult for management of HD orders.    History of mechanical aortic valve replacement  Plan:  -continue anticoagulation therpay with heparin while in hospital  -tele monitoring  -cards consulted for surgical clearance      Essential hypertension  Chronic, controlled. Latest blood pressure and vitals reviewed-     Temp:  [98.1 °F (36.7 °C)]   Pulse:  [76-78]   Resp:  [18]   BP: (106)/(59)   SpO2:  [94 %] .   Home meds for hypertension were reviewed and noted below.   Hypertension Medications               amLODIPine (NORVASC) 10 MG tablet Take 1 tablet by mouth daily    diltiaZEM (TIADYLT ER) 420 MG Cs24 Take 1 capsule (420 mg total) by mouth once daily.    hydrALAZINE (APRESOLINE) 100 MG tablet Take 1 tablet by mouth three times a day including dialysis days    labetaloL (NORMODYNE) 200 MG tablet Take 1 tablet (200 mg total) by mouth 3 (three) times daily.    lisinopriL (PRINIVIL,ZESTRIL) 40 MG tablet Take 1 tablet (40 mg total) by mouth once daily.    pep injection Inject 0.3 ml as directed     For compounding pharmacy use:   Add PAPAVERINE 30 mcg  Add PHENTOLAMINE 10 mg  Add ALPROSTADIL 100 mcg            While in the hospital, will manage blood pressure as follows; Continue home antihypertensive regimen    Will utilize p.r.n. blood pressure medication only if patient's blood pressure greater than 160/100 and he develops symptoms such as worsening chest pain or shortness of breath.      VTE Risk Mitigation (From admission, onward)           Ordered     " warfarin tablet 7.5 mg  Once per day on Mon Wed Fri 02/20/24 1015     warfarin split tablet 11.25 mg  Every Tues, Thurs, Sat, Sun         02/20/24 1015     enoxaparin injection 100 mg  Every 24 hours         02/20/24 1155     Reason for No Pharmacological VTE Prophylaxis  Once        Comments: Pending CoAg levels for heparin bridge   Question:  Reasons:  Answer:  Physician Provided (leave comment)    02/16/24 2116     Place FRANDY hose  Until discontinued         02/16/24 2018     IP VTE HIGH RISK PATIENT  Once         02/16/24 2018     Place sequential compression device  Until discontinued         02/16/24 2018                    Discharge Planning   SARBJIT:      Code Status: Full Code   Is the patient medically ready for discharge?:     Reason for patient still in hospital (select all that apply): Patient trending condition, Consult recommendations, and Pending disposition  Discharge Plan A: Home                  Yordan June MD  Department of Hospital Medicine   [facility] - Telemetry ([facility])

## 2024-02-20 NOTE — SUBJECTIVE & OBJECTIVE
Review of Systems  Objective:     Vital Signs (Most Recent):  Temp: 98.4 °F (36.9 °C) (02/20/24 0742)  Pulse: 79 (02/20/24 0813)  Resp: 18 (02/20/24 0742)  BP: 121/68 (02/20/24 0742)  SpO2: 95 % (02/20/24 0742) Vital Signs (24h Range):  Temp:  [97.9 °F (36.6 °C)-99.1 °F (37.3 °C)] 98.4 °F (36.9 °C)  Pulse:  [61-89] 79  Resp:  [16-20] 18  SpO2:  [93 %-100 %] 95 %  BP: (110-150)/() 121/68     Weight: 98.2 kg (216 lb 7.9 oz)  Body mass index is 27.8 kg/m².    Intake/Output Summary (Last 24 hours) at 2/20/2024 1248  Last data filed at 2/19/2024 2100  Gross per 24 hour   Intake 550 ml   Output 3502 ml   Net -2952 ml         Physical Exam         Constitutional:       General: He is awake. He is not in acute distress.     Appearance: Normal appearance. He is well-developed and well-groomed. He is not ill-appearing, toxic-appearing or diaphoretic.   HENT:      Head: Normocephalic and atraumatic.   Eyes:      Extraocular Movements: Extraocular movements intact.      Conjunctiva/sclera: Conjunctivae normal.   Cardiovascular:      Rate and Rhythm: Normal rate and regular rhythm.      Pulses: Normal pulses.      Heart sounds: Murmur heard.      Arteriovenous access: Left arteriovenous access is present.     Comments: +bruit and thrill noted to LLE.  Pulmonary:      Effort: Pulmonary effort is normal.      Breath sounds: Normal breath sounds. No decreased breath sounds, wheezing, rhonchi or rales.   Abdominal:      General: Bowel sounds are normal.      Palpations: Abdomen is soft.      Tenderness: There is no abdominal tenderness.   Musculoskeletal:      Cervical back: Normal range of motion and neck supple.      Right lower leg: No edema.      Left lower leg: No edema.      Comments: 5/5 strength throughout   Skin:     General: Skin is warm and dry.      Capillary Refill: Capillary refill takes less than 2 seconds.   Neurological:      General: No focal deficit present.      Mental Status: He is alert and oriented  to person, place, and time. Mental status is at baseline.      GCS: GCS eye subscore is 4. GCS verbal subscore is 5. GCS motor subscore is 6.      Cranial Nerves: Cranial nerves 2-12 are intact.      Motor: Motor function is intact.   Psychiatric:         Mood and Affect: Mood normal.         Behavior: Behavior normal. Behavior is cooperative.            Significant Labs: All pertinent labs within the past 24 hours have been reviewed.  CBC:   Recent Labs   Lab 02/18/24  1720 02/19/24  0423 02/19/24  1425   WBC 7.49 7.16 8.94   HGB 12.0* 10.6* 10.2*   HCT 36.0* 32.5* 30.4*    155 161     CMP:   Recent Labs   Lab 02/19/24  0422      K 5.3*   CL 97   CO2 23   *   BUN 82*   CREATININE 17.7*   CALCIUM 9.8   ALBUMIN 3.2*   ANIONGAP 19*       Significant Imaging:

## 2024-02-20 NOTE — PROGRESS NOTES
Formerly [facility] - Telemetry ([facility])  Nephrology  Progress Note    Patient Name: Isidro White Jr.  MRN: [id number]  Admission Date: 2/16/2024  Hospital Length of Stay: 4 days  Attending Provider: Bonifacio Shirley MD   Primary Care Physician: Boston Nevarez MD  Principal Problem:Preoperative clearance  Reason for Consult: Management of ESRD  Primary Nephrologist: Dr. Hi       Subjective:     HPI: 55 yo male with ESRD MWF [facility] admitted for heparin anticoagulation bridge for elective Urologic procedure, penile implant.  He dialyzes via LUE AVF with vintage almost 7 years. Last dialysis was yesterday and without event.No routine cramping and BP rarely drops.     2/19  - gross hematuria with flank pain, pain better controlled at my visit  - started dialysis but machine needed to be changed out     2/20  - pt frustrated with lab sticks and with timing of SHPT meds   - pain improved         Review of patient's allergies indicates:  No Known Allergies  Current Facility-Administered Medications   Medication Frequency    acetaminophen suppository 650 mg Q4H PRN    acetaminophen tablet 650 mg Q6H PRN    aluminum-magnesium hydroxide-simethicone 200-200-20 mg/5 mL suspension 30 mL QID (AC & HS)    amLODIPine tablet 10 mg Daily    calcitRIOL capsule 0.5 mcg Every Mon, Wed, Fri    calcium acetate(phosphat bind) capsule 2,001 mg TID WM    cinacalcet tablet 30 mg Every Mon, Wed, Fri    dextrose 10% bolus 125 mL 125 mL PRN    dextrose 10% bolus 250 mL 250 mL PRN    diltiaZEM 24 hr capsule 420 mg Daily    enoxaparin injection 100 mg Q24H (treatment, non-standard time)    glucagon (human recombinant) injection 1 mg PRN    glucose chewable tablet 16 g PRN    glucose chewable tablet 24 g PRN    hydrALAZINE tablet 100 mg TID    HYDROmorphone injection 1 mg Q4H PRN    hydrOXYzine HCL tablet 25 mg Daily PRN    labetaloL tablet 200 mg TID    LIDOcaine HCl 2% urojet Once    lisinopriL tablet 40 mg Daily    melatonin tablet  6 mg Nightly PRN    mupirocin 2 % ointment BID    naloxone 0.4 mg/mL injection 0.02 mg PRN    ondansetron injection 4 mg Q8H PRN    oxyCODONE-acetaminophen 5-325 mg per tablet 2 tablet Q4H PRN    pantoprazole EC tablet 40 mg Daily    polyethylene glycol packet 17 g Daily PRN    promethazine tablet 25 mg Q6H PRN    simethicone chewable tablet 80 mg QID PRN    sodium chloride 0.9% bolus 250 mL 250 mL PRN    sodium chloride 0.9% flush 10 mL PRN    sucralfate 100 mg/mL suspension 1 g Q6H    tenofovir disoproxil fumarate tablet 300 mg Weekly    vitamin renal formula (B-complex-vitamin c-folic acid) 1 mg per capsule 1 capsule Daily    warfarin split tablet 11.25 mg Every Tues, Thurs, Sat, Sun    [START ON 2/21/2024] warfarin tablet 7.5 mg Once per day on Mon Wed Fri     Facility-Administered Medications Ordered in Other Encounters   Medication Frequency    0.9%  NaCl infusion Continuous    sodium chloride 0.9% flush 10 mL PRN             Objective:     Vital Signs (Most Recent):  Temp: 97.9 °F (36.6 °C) (02/20/24 1250)  Pulse: 82 (02/20/24 1509)  Resp: 18 (02/20/24 1250)  BP: (!) 117/50 (02/20/24 1250)  SpO2: 97 % (02/20/24 1250) Vital Signs (24h Range):  Temp:  [97.9 °F (36.6 °C)-99 °F (37.2 °C)] 97.9 °F (36.6 °C)  Pulse:  [66-89] 82  Resp:  [16-20] 18  SpO2:  [93 %-100 %] 97 %  BP: (110-150)/() 117/50     Weight: 98.2 kg (216 lb 7.9 oz) (02/19/24 2339)  Body mass index is 27.8 kg/m².  Body surface area is 2.26 meters squared.    I/O last 3 completed shifts:  In: 850 [P.O.:350; Other:500]  Out: 3502 [Other:3502]    Physical Exam  Vitals and nursing note reviewed.   Constitutional:       General: He is not in acute distress.     Appearance: He is not ill-appearing.   HENT:      Head: Atraumatic.   Cardiovascular:      Rate and Rhythm: Normal rate.   Pulmonary:      Effort: Pulmonary effort is normal. No respiratory distress.      Breath sounds: No wheezing or rales.   Abdominal:      Palpations: Abdomen is soft.       Tenderness: There is no abdominal tenderness.   Musculoskeletal:      Right lower leg: No edema.      Left lower leg: No edema.   Skin:     General: Skin is warm and dry.   Neurological:      Mental Status: He is alert and oriented to person, place, and time. Mental status is at baseline.         Significant Labs: reviewed labs, Hb reviewed, trend noted     Assessment/Plan:     Active Diagnoses:    Diagnosis Date Noted POA    PRINCIPAL PROBLEM:  Preoperative clearance [Z01.818] 02/16/2024 Not Applicable    GERD (gastroesophageal reflux disease) [K21.9] 02/16/2024 Unknown    Chronic hepatitis B [B18.1] 12/06/2023 Yes    Hypertriglyceridemia [E78.1] 12/06/2023 Yes    Atrial fibrillation [I48.91] 03/27/2019 Yes    Chronic diastolic heart failure [I50.32] 03/11/2017 Yes    ESRD (end stage renal disease) on dialysis [N18.6, Z99.2]  Not Applicable     Chronic    History of mechanical aortic valve replacement [Z95.2] 02/09/2017 Not Applicable    Essential hypertension [I10]  Yes      Problems Resolved During this Admission:       ESRD MWF  - HD tomorrow    Gross Hematuria/Flank pain in PKD history   - heparin bridge held per Cardiology, s/p CT scan, monitoring H&H>>>stable H&H    HTN  - monitor on home meds    Secondary HPT  - calcitriol and Sensipar MWF  - phos binders  - low phos diet    Nutrition  - low potassium diet  - dialysis vitamin     Anemia, multifactorial  - on Mircera as outpatient  - s/p Epogen yesterday  - add Brenda Garcia MD  Nephrology  45 minutes of total time spent on the encounter, which includes face to face time and non-face to face time preparing to see the patient (eg, review of tests), Obtaining and/or reviewing separately obtained history, Documenting clinical information in the electronic or other health record, Independently interpreting results (not separately reported) and communicating results to the patient/family/caregiver, or Care coordination (not separately  reported).

## 2024-02-21 LAB
ALBUMIN SERPL BCP-MCNC: 2.8 G/DL (ref 3.5–5.2)
ANION GAP SERPL CALC-SCNC: 16 MMOL/L (ref 8–16)
ANION GAP SERPL CALC-SCNC: 16 MMOL/L (ref 8–16)
APTT PPP: 42.2 SEC (ref 21–32)
BASOPHILS # BLD AUTO: 0.02 K/UL (ref 0–0.2)
BASOPHILS NFR BLD: 0.3 % (ref 0–1.9)
BUN SERPL-MCNC: 72 MG/DL (ref 6–20)
BUN SERPL-MCNC: 73 MG/DL (ref 6–20)
CALCIUM SERPL-MCNC: 9.8 MG/DL (ref 8.7–10.5)
CALCIUM SERPL-MCNC: 9.9 MG/DL (ref 8.7–10.5)
CHLORIDE SERPL-SCNC: 96 MMOL/L (ref 95–110)
CHLORIDE SERPL-SCNC: 97 MMOL/L (ref 95–110)
CO2 SERPL-SCNC: 25 MMOL/L (ref 23–29)
CO2 SERPL-SCNC: 26 MMOL/L (ref 23–29)
CREAT SERPL-MCNC: 16.7 MG/DL (ref 0.5–1.4)
CREAT SERPL-MCNC: 16.7 MG/DL (ref 0.5–1.4)
DIFFERENTIAL METHOD BLD: ABNORMAL
EOSINOPHIL # BLD AUTO: 0.2 K/UL (ref 0–0.5)
EOSINOPHIL NFR BLD: 2.2 % (ref 0–8)
ERYTHROCYTE [DISTWIDTH] IN BLOOD BY AUTOMATED COUNT: 15.5 % (ref 11.5–14.5)
EST. GFR  (NO RACE VARIABLE): 3 ML/MIN/1.73 M^2
EST. GFR  (NO RACE VARIABLE): 3 ML/MIN/1.73 M^2
GLUCOSE SERPL-MCNC: 97 MG/DL (ref 70–110)
GLUCOSE SERPL-MCNC: 98 MG/DL (ref 70–110)
HBV SURFACE AB SER QL IA: NEGATIVE
HBV SURFACE AB SERPL IA-ACNC: <3 MIU/ML
HCT VFR BLD AUTO: 31 % (ref 40–54)
HGB BLD-MCNC: 10.2 G/DL (ref 14–18)
IMM GRANULOCYTES # BLD AUTO: 0.02 K/UL (ref 0–0.04)
IMM GRANULOCYTES NFR BLD AUTO: 0.3 % (ref 0–0.5)
INR PPP: 1.6 (ref 0.8–1.2)
LYMPHOCYTES # BLD AUTO: 1.4 K/UL (ref 1–4.8)
LYMPHOCYTES NFR BLD: 18.2 % (ref 18–48)
MCH RBC QN AUTO: 33.8 PG (ref 27–31)
MCHC RBC AUTO-ENTMCNC: 32.9 G/DL (ref 32–36)
MCV RBC AUTO: 103 FL (ref 82–98)
MONOCYTES # BLD AUTO: 1.3 K/UL (ref 0.3–1)
MONOCYTES NFR BLD: 17.2 % (ref 4–15)
NEUTROPHILS # BLD AUTO: 4.7 K/UL (ref 1.8–7.7)
NEUTROPHILS NFR BLD: 61.8 % (ref 38–73)
NRBC BLD-RTO: 0 /100 WBC
PHOSPHATE SERPL-MCNC: 4.7 MG/DL (ref 2.7–4.5)
PLATELET # BLD AUTO: 137 K/UL (ref 150–450)
PMV BLD AUTO: 10.9 FL (ref 9.2–12.9)
POTASSIUM SERPL-SCNC: 4.6 MMOL/L (ref 3.5–5.1)
POTASSIUM SERPL-SCNC: 4.6 MMOL/L (ref 3.5–5.1)
PROTHROMBIN TIME: 16.7 SEC (ref 9–12.5)
RBC # BLD AUTO: 3.02 M/UL (ref 4.6–6.2)
SODIUM SERPL-SCNC: 138 MMOL/L (ref 136–145)
SODIUM SERPL-SCNC: 138 MMOL/L (ref 136–145)
WBC # BLD AUTO: 7.57 K/UL (ref 3.9–12.7)

## 2024-02-21 PROCEDURE — 63600175 PHARM REV CODE 636 W HCPCS: Performed by: NURSE PRACTITIONER

## 2024-02-21 PROCEDURE — 63600175 PHARM REV CODE 636 W HCPCS: Performed by: UROLOGY

## 2024-02-21 PROCEDURE — 25000003 PHARM REV CODE 250: Performed by: INTERNAL MEDICINE

## 2024-02-21 PROCEDURE — 25000003 PHARM REV CODE 250: Performed by: NURSE PRACTITIONER

## 2024-02-21 PROCEDURE — 85025 COMPLETE CBC W/AUTO DIFF WBC: CPT | Performed by: UROLOGY

## 2024-02-21 PROCEDURE — 80100014 HC HEMODIALYSIS 1:1

## 2024-02-21 PROCEDURE — 99232 SBSQ HOSP IP/OBS MODERATE 35: CPT | Mod: ,,, | Performed by: PHYSICIAN ASSISTANT

## 2024-02-21 PROCEDURE — 90935 HEMODIALYSIS ONE EVALUATION: CPT | Mod: ,,, | Performed by: INTERNAL MEDICINE

## 2024-02-21 PROCEDURE — 85610 PROTHROMBIN TIME: CPT | Performed by: UROLOGY

## 2024-02-21 PROCEDURE — 80069 RENAL FUNCTION PANEL: CPT | Performed by: UROLOGY

## 2024-02-21 PROCEDURE — 63600175 PHARM REV CODE 636 W HCPCS: Performed by: INTERNAL MEDICINE

## 2024-02-21 PROCEDURE — 80048 BASIC METABOLIC PNL TOTAL CA: CPT | Performed by: UROLOGY

## 2024-02-21 PROCEDURE — 99232 SBSQ HOSP IP/OBS MODERATE 35: CPT | Mod: ,,, | Performed by: UROLOGY

## 2024-02-21 PROCEDURE — 85730 THROMBOPLASTIN TIME PARTIAL: CPT | Performed by: UROLOGY

## 2024-02-21 PROCEDURE — 21400001 HC TELEMETRY ROOM

## 2024-02-21 PROCEDURE — 25000003 PHARM REV CODE 250: Performed by: UROLOGY

## 2024-02-21 RX ADMIN — CALCIUM ACETATE 2001 MG: 667 CAPSULE ORAL at 05:02

## 2024-02-21 RX ADMIN — DILTIAZEM HYDROCHLORIDE 420 MG: 240 CAPSULE, COATED, EXTENDED RELEASE ORAL at 08:02

## 2024-02-21 RX ADMIN — CALCIUM ACETATE 2001 MG: 667 CAPSULE ORAL at 08:02

## 2024-02-21 RX ADMIN — SUCRALFATE ORAL 1 G: 1 SUSPENSION ORAL at 05:02

## 2024-02-21 RX ADMIN — CINACALCET 30 MG: 30 TABLET, FILM COATED ORAL at 08:02

## 2024-02-21 RX ADMIN — CALCITRIOL CAPSULES 0.25 MCG 0.5 MCG: 0.25 CAPSULE ORAL at 08:02

## 2024-02-21 RX ADMIN — MUPIROCIN: 20 OINTMENT TOPICAL at 08:02

## 2024-02-21 RX ADMIN — CALCIUM ACETATE 2001 MG: 667 CAPSULE ORAL at 11:02

## 2024-02-21 RX ADMIN — Medication 6 MG: at 08:02

## 2024-02-21 RX ADMIN — WARFARIN SODIUM 7.5 MG: 2.5 TABLET ORAL at 05:02

## 2024-02-21 RX ADMIN — PANTOPRAZOLE SODIUM 40 MG: 40 TABLET, DELAYED RELEASE ORAL at 08:02

## 2024-02-21 RX ADMIN — HYDROMORPHONE HYDROCHLORIDE 1 MG: 1 INJECTION, SOLUTION INTRAMUSCULAR; INTRAVENOUS; SUBCUTANEOUS at 08:02

## 2024-02-21 RX ADMIN — ALUMINUM HYDROXIDE, MAGNESIUM HYDROXIDE, AND DIMETHICONE 30 ML: 200; 20; 200 SUSPENSION ORAL at 08:02

## 2024-02-21 RX ADMIN — ALUMINUM HYDROXIDE, MAGNESIUM HYDROXIDE, AND DIMETHICONE 30 ML: 200; 20; 200 SUSPENSION ORAL at 06:02

## 2024-02-21 RX ADMIN — HYDROXYZINE HYDROCHLORIDE 25 MG: 25 TABLET ORAL at 11:02

## 2024-02-21 RX ADMIN — ENOXAPARIN SODIUM 100 MG: 100 INJECTION SUBCUTANEOUS at 02:02

## 2024-02-21 RX ADMIN — SUCRALFATE ORAL 1 G: 1 SUSPENSION ORAL at 11:02

## 2024-02-21 RX ADMIN — OXYCODONE HYDROCHLORIDE AND ACETAMINOPHEN 2 TABLET: 5; 325 TABLET ORAL at 05:02

## 2024-02-21 RX ADMIN — NEPHROCAP 1 CAPSULE: 1 CAP ORAL at 08:02

## 2024-02-21 NOTE — PROGRESS NOTES
[facility] Medicine  Progress Note    Patient Name: Isidro White Jr.  MRN: [id number]  Patient Class: IP- Inpatient   Admission Date: 2/16/2024  Length of Stay: 5 days  Attending Physician: Bonifacio hSirley MD  Primary Care Provider: Boston Nevarez MD        Subjective:     Principal Problem:Preoperative clearance        HPI:  Isidro White Jr. is a 54 y.o. 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 Presents to the hospital as a direct admit for bridging of coumadin with heparin to have penile prosthesis surgery performed by Urology (Dr. Shirley) on 02/20/2024.  Patient receives HD on M/W/F and states he received his full dialysis session prior to arrival to the hospital.  States he does not produce urine. Reports last dose of Coumadin was yesterday.  Denies any complaints at this time.  Hospital Medicine consulted to manage comorbidities/medical problems and to manage heparin drip.    PCP: Boston Nevarez      Overview/Hospital Course:  Isidro White Jr. is a 54 y.o. male with a PMH  has a past medical history of Anemia, Aortic valve stenosis, Atrial fibrillation, Atrial flutter, Cardiomyopathy, CHF (congestive heart failure), Drug-induced erectile dysfunction, ESRD due to hypertension, ESRD on dialysis, GERD (gastroesophageal reflux disease), Hepatitis B, Hyperlipidemia, Hypertension, Nightmares, Obesity, DANIEL (obstructive sleep apnea) (11/12/2019), Secondary hyperparathyroidism of renal origin,  Supraventricular tachycardia, Tachycardia, and Valvular regurgitation. Presents to the hospital as a direct admit for bridging of coumadin with heparin to have penile prosthesis surgery performed by Urology (Dr. Shirley) on 02/20/2024.  Patient receives HD on M/W/F and states he received his full dialysis session prior to arrival to the hospital.  States he does not produce urine. Reports last dose of Coumadin was yesterday.  Denies any complaints at this time.  Hospital Medicine consulted to manage comorbidities/medical problems and to manage heparin drip.    02/19/2024   Patient initially presented to undergo elective penile prosthesis implantation, Urology primary on patient, hospital medicine consulted for medical management.    Patient on Coumadin outpatient for mechanical aortic valve prosthesis, bridged to heparin drip for urological intervention.    On 02/19/2024, patient continued to have persistent hematuria with drop in hemoglobin, heparin drip held per Cardiology, Urology plans to postpone penile prosthesis implantation for now, plans for clot evacuation tomorrow.  Follow up on stat CT abdomen pelvis.  Follow up on repeat CBC this afternoon.      02/20/2024   Initially urology plan for clot evacuation, however patient noted to have improvement in hematuria, no plans for clot evacuation at this point, resumed diet per Urology.    Given recent hematuria, Urology plans to hold penile prosthesis implantation at this point.    Plans to bridge heparin to Coumadin, however patient refused heparin drip, Lovenox initiated per Urology   Monitor labs    02/21/2024   Complaining of flank pain,/ suprapubic discomfort.  Remained afebrile, hemodynamically stable, denied any hematuria.    INR 1.6, currently on Lovenox bridging to Coumadin.    Interval History:     No acute events overnight   Monitor PT INR   Plan per primary/urology       Review of Systems  Objective:     Vital Signs (Most Recent):  Temp: 98 °F (36.7  °C) (02/21/24 0801)  Pulse: 78 (02/21/24 0801)  Resp: 18 (02/21/24 0801)  BP: (!) 105/54 (02/21/24 0801)  SpO2: 97 % (02/21/24 0801) Vital Signs (24h Range):  Temp:  [97.7 °F (36.5 °C)-98.5 °F (36.9 °C)] 98 °F (36.7 °C)  Pulse:  [71-91] 78  Resp:  [18-19] 18  SpO2:  [92 %-97 %] 97 %  BP: ()/(53-63) 105/54     Weight: 98.2 kg (216 lb 7.9 oz)  Body mass index is 27.8 kg/m².  No intake or output data in the 24 hours ending 02/21/24 1317      Physical Exam      Constitutional:       General: He is awake. He is not in acute distress.     Appearance: Normal appearance. He is well-developed and well-groomed. He is not ill-appearing, toxic-appearing or diaphoretic.   HENT:      Head: Normocephalic and atraumatic.   Eyes:      Extraocular Movements: Extraocular movements intact.      Conjunctiva/sclera: Conjunctivae normal.   Cardiovascular:      Rate and Rhythm: Normal rate and regular rhythm.      Pulses: Normal pulses.      Heart sounds: Murmur heard.      Arteriovenous access: Left arteriovenous access is present.     Comments: +bruit and thrill noted to LLE.  Pulmonary:      Effort: Pulmonary effort is normal.      Breath sounds: Normal breath sounds. No decreased breath sounds, wheezing, rhonchi or rales.   Abdominal:      General: Bowel sounds are normal.      Palpations: Abdomen is soft.      Tenderness: There is no abdominal tenderness.   Musculoskeletal:      Cervical back: Normal range of motion and neck supple.      Right lower leg: No edema.      Left lower leg: No edema.      Comments: 5/5 strength throughout   Skin:     General: Skin is warm and dry.      Capillary Refill: Capillary refill takes less than 2 seconds.   Neurological:      General: No focal deficit present.      Mental Status: He is alert and oriented to person, place, and time. Mental status is at baseline.      GCS: GCS eye subscore is 4. GCS verbal subscore is 5. GCS motor subscore is 6.      Cranial Nerves: Cranial nerves 2-12 are  intact.      Motor: Motor function is intact.   Psychiatric:         Mood and Affect: Mood normal.         Behavior: Behavior normal. Behavior is cooperative.   Significant Labs: All pertinent labs within the past 24 hours have been reviewed.  CBC:   Recent Labs   Lab 02/19/24  1425 02/20/24  1502 02/21/24  1040   WBC 8.94 9.42 7.57   HGB 10.2* 11.3* 10.2*   HCT 30.4* 34.7* 31.0*    159 137*     CMP:   Recent Labs   Lab 02/21/24  1040     138   K 4.6  4.6   CL 97  96   CO2 25  26   GLU 97  98   BUN 72*  73*   CREATININE 16.7*  16.7*   CALCIUM 9.9  9.8   ALBUMIN 2.8*   ANIONGAP 16  16       Significant Imaging:          Assessment/Plan:      * Preoperative clearance  Admitted for heparin bridge from coumadin for scheduled penile prosthesis surgery to be performed by Dr. Shirley.  RCRI revealed class 4 risk with 15.0% 30 day risk of death, mi, cardiac arrest.  Plan:  -pre-op labs and imaging  -heparin bridge with nomogram  -manage co-morbidities   -cards consult for surgical clearance  -urology primary team    GERD (gastroesophageal reflux disease)  Chronic. Stable. Currently asymptomatic. Home medications include PPI/Antacids as needed.  Plan:  -Continue PPI/Antacids as needed         Hypertriglyceridemia  Patient is not chronically on statin.will not continue for now. Last Lipid Panel:   Lab Results   Component Value Date    CHOL 248 (H) 11/05/2020    HDL 42 11/05/2020    LDLCALC 164.8 (H) 11/05/2020    TRIG 206 (H) 11/05/2020    CHOLHDL 16.9 (L) 11/05/2020     Plan:  -Continue home medication  -low fat/low calorie diet        Chronic hepatitis B  Patient has chronic liver disease due to hepatitis b. Their liver disease is compensated. Hepatology has not been consulted. We will obtain daily CBC, CMP, and INR.Will continue Tenofovir. Their most current Na-MELD is listed below.  MELD 3.0: 24 at 10/10/2023  9:27 AM  MELD-Na: 26 at 10/10/2023  9:27 AM  Calculated from:  Serum Creatinine: On  dialysis. Using the maximum value.  Serum Sodium: 143 mmol/L (Using max of 137 mmol/L) at 10/10/2023  8:31 AM  Total Bilirubin: 0.5 mg/dL (Using min of 1 mg/dL) at 10/10/2023  8:31 AM  Serum Albumin: 3.3 g/dL at 10/10/2023  8:31 AM  INR(ratio): 1.8 at 10/10/2023  9:27 AM  Age at listing (hypothetical): 53 years  Sex: Male at 10/10/2023  9:27 AM        Atrial fibrillation  Patient with Paroxysmal (<7 days) atrial fibrillation which is controlled currently with Beta Blocker and Calcium Channel Blocker. Patient is currently in atrial fibrillation.[id number] Score: The patient doesn't have any registry metric data available. HASBLED Score: . Anticoagulation indicated. Anticoagulation done with coumadin, but bridging with heparin for scheduled surgical procedure .    Chronic diastolic heart failure  Patient is identified as having Diastolic (HFpEF) heart failure that is Chronic. CHF is currently controlled. Latest ECHO performed and demonstrates- Results for orders placed during the hospital encounter of 04/27/23    Echo    Interpretation Summary  · The left ventricle is normal in size with concentric remodeling and low normal systolic function.  · Mild left atrial enlargement.  · Grade I left ventricular diastolic dysfunction.  · The estimated PA systolic pressure is 47 mmHg.  · Normal right ventricular size with normal right ventricular systolic function.  · There is pulmonary hypertension.  · Normal central venous pressure (3 mmHg).  · There is a bileaflet tilting disc mechanical aortic valve present. Prosthetic aortic valve is normal.  · The aortic valve mean gradient is 19 mmHg with a dimensionless index of 0.47.  · The estimated ejection fraction is 50%.  · The mean diastolic gradient across the mitral valve is 11 mmHg at a heart rate of 76 bpm.  · There is mild to moderate mitral stenosis.  · Mild to moderate tricuspid regurgitation.  . Continue Beta Blocker and monitor clinical status closely. Monitor on  "telemetry. Patient is off CHF pathway.  Monitor strict Is&Os and daily weights.  Place on fluid restriction of 1.5 L. Continue to stress to patient importance of self efficacy and  on diet for CHF. Last BNP reviewed- and noted below No results for input(s): "BNP", "BNPTRIAGEBLO" in the last 168 hours..            ESRD (end stage renal disease) on dialysis  Creatine stable for now. BMP reviewed- noted Estimated Creatinine Clearance: 8.7 mL/min (A) (based on SCr of 11.4 mg/dL (H)). according to latest data. Based on current GFR, CKD stage is end stage.  Monitor UOP and serial BMP and adjust therapy as needed. Renally dose meds. Avoid nephrotoxic medications and procedures. Nephrology consult for management of HD orders.    History of mechanical aortic valve replacement  Plan:  -continue anticoagulation therpay with heparin while in hospital  -tele monitoring  -cards consulted for surgical clearance      Essential hypertension  Chronic, controlled. Latest blood pressure and vitals reviewed-     Temp:  [98.1 °F (36.7 °C)]   Pulse:  [76-78]   Resp:  [18]   BP: (106)/(59)   SpO2:  [94 %] .   Home meds for hypertension were reviewed and noted below.   Hypertension Medications               amLODIPine (NORVASC) 10 MG tablet Take 1 tablet by mouth daily    diltiaZEM (TIADYLT ER) 420 MG Cs24 Take 1 capsule (420 mg total) by mouth once daily.    hydrALAZINE (APRESOLINE) 100 MG tablet Take 1 tablet by mouth three times a day including dialysis days    labetaloL (NORMODYNE) 200 MG tablet Take 1 tablet (200 mg total) by mouth 3 (three) times daily.    lisinopriL (PRINIVIL,ZESTRIL) 40 MG tablet Take 1 tablet (40 mg total) by mouth once daily.    pep injection Inject 0.3 ml as directed     For compounding pharmacy use:   Add PAPAVERINE 30 mcg  Add PHENTOLAMINE 10 mg  Add ALPROSTADIL 100 mcg            While in the hospital, will manage blood pressure as follows; Continue home antihypertensive regimen    Will utilize p.r.n. " blood pressure medication only if patient's blood pressure greater than 160/100 and he develops symptoms such as worsening chest pain or shortness of breath.      VTE Risk Mitigation (From admission, onward)           Ordered     warfarin tablet 7.5 mg  Once per day on Mon Wed Fri 02/20/24 1015     warfarin split tablet 11.25 mg  Every Tues, Thurs, Sat, Sun         02/20/24 1015     enoxaparin injection 100 mg  Every 24 hours         02/20/24 1155     Reason for No Pharmacological VTE Prophylaxis  Once        Comments: Pending CoAg levels for heparin bridge   Question:  Reasons:  Answer:  Physician Provided (leave comment)    02/16/24 2116     Place FRANDY hose  Until discontinued         02/16/24 2018     IP VTE HIGH RISK PATIENT  Once         02/16/24 2018     Place sequential compression device  Until discontinued         02/16/24 2018                    Discharge Planning   SARBJIT:      Code Status: Full Code   Is the patient medically ready for discharge?:     Reason for patient still in hospital (select all that apply): Patient trending condition, Consult recommendations, and Pending disposition  Discharge Plan A: Home                  [facility] Radha June MD  Department of Hospital Medicine   [employer] - Telemetry ([facility])

## 2024-02-21 NOTE — PROGRESS NOTES
NEPHROLOGY HEMODIALYSIS NOTE    Isidro White Jr. is a 54 y.o. male currently on hemodialysis and was seen/evaluated by me for removal of uremic toxins and volume.    Blood pressure is stable    Ultrafiltration goal is 2-3 L as tolerated    Labs have been reviewed and the dialysate bath has been adjusted.    There are no symptoms of hypotension, chest pain, dyspnea, nausea or vomiting. Notes low pelvic pain again     Labs:      Recent Labs   Lab 02/16/24  2123 02/19/24  0422 02/21/24  1040    139 138  138   K 4.0 5.3* 4.6  4.6   CL 97 97 97  96   CO2 26 23 25  26   BUN 42* 82* 72*  73*   CREATININE 11.4* 17.7* 16.7*  16.7*   CALCIUM 9.5 9.8 9.9  9.8   PHOS  --  5.1* 4.7*       Recent Labs   Lab 02/19/24  1425 02/20/24  1502 02/21/24  1040   WBC 8.94 9.42 7.57   HGB 10.2* 11.3* 10.2*   HCT 30.4* 34.7* 31.0*    159 137*       Assessment/Plan:    HD today for removal of uremic toxins and volume.  S/p Epogen yesterday, has Mircera on board from outpatient clinic

## 2024-02-21 NOTE — PHYSICIAN QUERY
"PT Name: Isidro White Jr.  MR #: [id number]    DOCUMENTATION CLARIFICATION     CDS/: Daly Owen               Contact information:    This form is a permanent document in the medical record.     Query Date: February 21, 2024    By submitting this query, we are merely seeking further clarification of documentation. Please utilize your independent clinical judgment when addressing the question(s) below.    Clinical Information Location in Medical Record   Preop clearance  direct admit - bridging coumadin w/heparin penile prosthesis sx    heparin drip initiated last night.  Coumadin held since 02/15/2024.     2/16 Hospital CN    2/17 Urology H&P   Work up flank pain, presumed hemorrhagic complication on heparin gtt.  Ok to hold heparin gtt temporarily until workup/tx completed.   2/19 cardiology PN   continued to have persistent hematuria with drop in hemoglobin,   heparin drip held per Cardiology, Urology plans to postpone penile prosthesis implantation    Hosp Pn 2/19   Gross hematuria- stable for now, hold on surgery.   Pt refuses heparin drip, compliant with lovenox    2/19   concerns for bleed on heparin gtt.    2/20   CT yesterday with blood clot within urinary bladder,                                    slightly increased hydronephrosis within the right kidney w/high                                    density thought to reflect blood products.    2/20 Urology PN          Cardiology Pn 2/20             Provider,   please clarify if there is any clinical correlation between "hematuria" & "heparin drip"            Are the conditions:    [ X ] Due to or associated with each other     [  ] Unrelated to each other     [  ] Other explanation (Please Specify): ___________     [  ]   Clinically Undetermined                                                  Please document in your progress notes daily for the duration of treatment until resolved and include in your discharge summary.                           "

## 2024-02-21 NOTE — PROGRESS NOTES
Coumadin Progress Notes:  Indication:afib with MAVR   INR = 1.6 today trended up from 1.2 yesterday  INR goal:2-3   Home Dose:7.5 mg every MWF and 11.5 mg all other days   Bridging with lovenox until INR is in therapeutic range  Will continue home dosing regimen today    Estimated Creatinine Clearance: 5.9 mL/min (A) (based on SCr of 16.7 mg/dL (H)).   Body mass index is 27.8 kg/m².   Lab Results   Component Value Date    WBC 7.57 02/21/2024    HGB 10.2 (L) 02/21/2024    HCT 31.0 (L) 02/21/2024     (H) 02/21/2024     (L) 02/21/2024       Drug interactions:   APAP, melatonin and lovenox bridge all increase the anticoagulant effect  Succralfate decreases the anticoagulant effect

## 2024-02-21 NOTE — SUBJECTIVE & OBJECTIVE
Interval History:     No acute events overnight   Monitor PT INR   Plan per primary/urology       Review of Systems  Objective:     Vital Signs (Most Recent):  Temp: 98 °F (36.7 °C) (02/21/24 0801)  Pulse: 78 (02/21/24 0801)  Resp: 18 (02/21/24 0801)  BP: (!) 105/54 (02/21/24 0801)  SpO2: 97 % (02/21/24 0801) Vital Signs (24h Range):  Temp:  [97.7 °F (36.5 °C)-98.5 °F (36.9 °C)] 98 °F (36.7 °C)  Pulse:  [71-91] 78  Resp:  [18-19] 18  SpO2:  [92 %-97 %] 97 %  BP: ()/(53-63) 105/54     Weight: 98.2 kg (216 lb 7.9 oz)  Body mass index is 27.8 kg/m².  No intake or output data in the 24 hours ending 02/21/24 1317      Physical Exam      Constitutional:       General: He is awake. He is not in acute distress.     Appearance: Normal appearance. He is well-developed and well-groomed. He is not ill-appearing, toxic-appearing or diaphoretic.   HENT:      Head: Normocephalic and atraumatic.   Eyes:      Extraocular Movements: Extraocular movements intact.      Conjunctiva/sclera: Conjunctivae normal.   Cardiovascular:      Rate and Rhythm: Normal rate and regular rhythm.      Pulses: Normal pulses.      Heart sounds: Murmur heard.      Arteriovenous access: Left arteriovenous access is present.     Comments: +bruit and thrill noted to LLE.  Pulmonary:      Effort: Pulmonary effort is normal.      Breath sounds: Normal breath sounds. No decreased breath sounds, wheezing, rhonchi or rales.   Abdominal:      General: Bowel sounds are normal.      Palpations: Abdomen is soft.      Tenderness: There is no abdominal tenderness.   Musculoskeletal:      Cervical back: Normal range of motion and neck supple.      Right lower leg: No edema.      Left lower leg: No edema.      Comments: 5/5 strength throughout   Skin:     General: Skin is warm and dry.      Capillary Refill: Capillary refill takes less than 2 seconds.   Neurological:      General: No focal deficit present.      Mental Status: He is alert and oriented to person,  place, and time. Mental status is at baseline.      GCS: GCS eye subscore is 4. GCS verbal subscore is 5. GCS motor subscore is 6.      Cranial Nerves: Cranial nerves 2-12 are intact.      Motor: Motor function is intact.   Psychiatric:         Mood and Affect: Mood normal.         Behavior: Behavior normal. Behavior is cooperative.   Significant Labs: All pertinent labs within the past 24 hours have been reviewed.  CBC:   Recent Labs   Lab 02/19/24  1425 02/20/24  1502 02/21/24  1040   WBC 8.94 9.42 7.57   HGB 10.2* 11.3* 10.2*   HCT 30.4* 34.7* 31.0*    159 137*     CMP:   Recent Labs   Lab 02/21/24  1040     138   K 4.6  4.6   CL 97  96   CO2 25  26   GLU 97  98   BUN 72*  73*   CREATININE 16.7*  16.7*   CALCIUM 9.9  9.8   ALBUMIN 2.8*   ANIONGAP 16  16       Significant Imaging:

## 2024-02-21 NOTE — NURSING
Per primary RN, pt had refused lab draw this am, so labs will be obtained upon cannulation prior to dialysis tx.

## 2024-02-21 NOTE — ASSESSMENT & PLAN NOTE
54 y.o. male pt with PAF, ASD closure at age 8 ([facility]), HFpEF, s/p AVR with a 22 mm mechanical valve and TV annuloplasty with a 28 mm ring 3/17, HTN, PHTN, Mitral stenosis, SVT, EP performed RFA (3/13/17) , ESRD presents to the hospital as a direct admit for bridging of coumadin with heparin to have penile prosthesis surgery performed by Urology (Dr. Shirley) on 02/20/2024. Patient denies CP, angina or anginal equivalent.    Post surgery heparin and coumadin to start once ok with surgeon.    2/20/24  -INR 1.2 today  -CT from yesterday reviewed, + bladder clot with slightly increased hydronephrosis within the right kidney with high density thought to reflect blood products.  -Abd pain improved this AM  -Recommend resumption of Coumadin this AM and resumption of heparin bridge tmw, will discuss with primary team    2/21/24  -INR 1.6  -On Lovenox bridge, Coumadin resumed yesterday

## 2024-02-21 NOTE — PROGRESS NOTES
02/21/24 1336   Required for all Hemodialysis Patients   Hepatitis Status positive   Handoff Report   Received From Kwesi White RN   Given To Katharine Miner RN   Treatment Type   Treatment Type Acute   Vital Signs   Temp 98.5 °F (36.9 °C)   Temp Source Axillary   Pulse 77   Heart Rate Source Monitor   Resp 20   SpO2 99 %   Pulse Oximetry Type Intermittent   Probe Placed On (Pulse Ox) Right:;finger   Device (Oxygen Therapy) room air   /64   MAP (mmHg) 77   BP Location Right arm   BP Method Automatic   Patient Position Lying   Post-Hemodialysis Assessment   Rinseback Volume (mL) 250 mL   Blood Volume Processed (Liters) 53.1 L   Dialyzer Clearance Moderately streaked   Duration of Treatment 180 minutes   Additional Fluid Intake (mL) 950 mL   Total UF (mL) 3603 mL   Net Fluid Removal 2653   Patient Response to Treatment tolerated   Post-Hemodialysis Comments 3 hr I-HDTx completed as planned, NET UF goal reached without difficulty, Blood reperfused and pt de accessed according to P&P. RTF with primary RN. machine remains at bedside , sequestered r/t +HBsAG results as per P&P.

## 2024-02-21 NOTE — PROGRESS NOTES
[facility] - Telemetry ([facility])  Cardiology  Progress Note    Patient Name: Isidro White Jr.  MRN: [id number]  Admission Date: 2/16/2024  Hospital Length of Stay: 5 days  Code Status: Full Code   Attending Physician: Bonifacio Shirley MD   Primary Care Physician: Boston Nevarez MD  Expected Discharge Date:   Principal Problem:Preoperative clearance    Subjective:   HPI:  54 y.o. male pt with PAF, ASD closure at age 8 ([facility]), HFpEF, s/p AVR with a 22 mm mechanical valve and TV annuloplasty with a 28 mm ring 3/17, HTN, PHTN, Mitral stenosis, SVT, EP performed RFA (3/13/17) , ESRD presents to the hospital as a direct admit for bridging of coumadin with heparin to have penile prosthesis surgery performed by Urology (Dr. Shirley) on 02/20/2024. Patient denies CP, angina or anginal equivalent.         Hospital Course:   2/18/24- Surgery planned for Tuesday, currently bridging IV heparin , while holding coumadin. Pt without any complaints. BP is stable.     2/19/24: pt has right flank pain today, concerns for bleed on heparin gtt.  Urology ordering CT scan, working pt up.  Surgery  needs to be cancelled.    2/20/24-Patient seen and examined today, resting in bed. Feeling better. Abdominal pain/soreness improved. CT yesterday with blood clot within urinary bladder, slightly increased hydronephrosis within the right kidney with high density thought to reflect blood products. Urology following, no intervention planned, will monitor. INR 1.2, needs to resume heparin bridge tmw. Will discuss resumption of Coumadin today.    2/21/24-Patient seen and examined today. Complains of mild suprapubic pressure/right flank pain. No CV complaints. INR 1.6, on Lovenox bridge. Coumadin resumed yesterday.        Review of Systems   Constitutional: Negative.   HENT: Negative.     Eyes: Negative.    Respiratory: Negative.     Endocrine: Negative.    Hematologic/Lymphatic: Negative.    Skin: Negative.     Gastrointestinal:  Positive for abdominal pain.   Genitourinary:  Positive for flank pain.   Neurological: Negative.    Psychiatric/Behavioral: Negative.     Allergic/Immunologic: Negative.      Objective:     Vital Signs (Most Recent):  Temp: 98 °F (36.7 °C) (02/21/24 0801)  Pulse: 78 (02/21/24 0801)  Resp: 18 (02/21/24 0801)  BP: (!) 105/54 (02/21/24 0801)  SpO2: 97 % (02/21/24 0801) Vital Signs (24h Range):  Temp:  [97.7 °F (36.5 °C)-98.5 °F (36.9 °C)] 98 °F (36.7 °C)  Pulse:  [71-91] 78  Resp:  [18-19] 18  SpO2:  [92 %-97 %] 97 %  BP: ()/(53-63) 105/54     Weight: 98.2 kg (216 lb 7.9 oz)  Body mass index is 27.8 kg/m².     SpO2: 97 %       No intake or output data in the 24 hours ending 02/21/24 1331    Lines/Drains/Airways       Central Venous Catheter Line  Duration                  Hemodialysis AV Graft 07/04/20 0701 Left upper arm 1327 days              Peripheral Intravenous Line  Duration                  Peripheral IV - Single Lumen 02/16/24 0953 20 G Anterior;Right Forearm 5 days                       Physical Exam  Vitals and nursing note reviewed.   Constitutional:       General: He is not in acute distress.     Appearance: Normal appearance. He is well-developed. He is not diaphoretic.   HENT:      Head: Normocephalic and atraumatic.   Eyes:      General:         Right eye: No discharge.         Left eye: No discharge.      Pupils: Pupils are equal, round, and reactive to light.   Cardiovascular:      Rate and Rhythm: Normal rate and regular rhythm.      Heart sounds: Normal heart sounds, S1 normal and S2 normal. No murmur heard.     Comments: +mechanical click  Pulmonary:      Effort: Pulmonary effort is normal. No respiratory distress.      Breath sounds: Normal breath sounds. No wheezing or rales.   Abdominal:      General: There is no distension.   Musculoskeletal:      Right lower leg: No edema.      Left lower leg: No edema.   Skin:     General: Skin is warm and dry.      Findings: No  "erythema.   Neurological:      General: No focal deficit present.      Mental Status: He is alert and oriented to person, place, and time.   Psychiatric:         Mood and Affect: Mood normal.         Behavior: Behavior normal.            Significant Labs: CMP   Recent Labs   Lab 02/21/24  1040     138   K 4.6  4.6   CL 97  96   CO2 25  26   GLU 97  98   BUN 72*  73*   CREATININE 16.7*  16.7*   CALCIUM 9.9  9.8   ALBUMIN 2.8*   ANIONGAP 16  16   , CBC   Recent Labs   Lab 02/19/24  1425 02/20/24  1502 02/21/24  1040   WBC 8.94 9.42 7.57   HGB 10.2* 11.3* 10.2*   HCT 30.4* 34.7* 31.0*    159 137*   , Troponin No results for input(s): "TROPONINI" in the last 48 hours., and All pertinent lab results from the last 24 hours have been reviewed.    Significant Imaging: Echocardiogram: Transthoracic echo (TTE) complete (Cupid Only):   Results for orders placed or performed during the hospital encounter of 04/27/23   Echo   Result Value Ref Range    BSA 2.24 m2    LV LATERAL E/E' RATIO 19.50 m/s    LA WIDTH 3.50 cm    Left Ventricular Outflow Tract Mean Velocity 1.01 cm/s    Left Ventricular Outflow Tract Mean Gradient 4.38 mmHg    Pulmonary Valve Mean Velocity 0.77 m/s    TDI LATERAL 0.08 m/s    PV PEAK VELOCITY 1.25 cm/s    LVIDd 4.57 3.5 - 6.0 cm    IVS 1.45 (A) 0.6 - 1.1 cm    Posterior Wall 1.25 (A) 0.6 - 1.1 cm    Ao root annulus 3.89 cm    LVIDs 3.52 2.1 - 4.0 cm    FS 23 28 - 44 %    LA volume 81.61 cm3    Sinus 2.73 cm    STJ 2.84 cm    LV mass 240.88 g    LA size 4.53 cm    RVDD 2.69 cm    Left Ventricle Relative Wall Thickness 0.55 cm    AV mean gradient 19 mmHg    AV valve area 1.50 cm2    AV Velocity Ratio 0.44     AV index (prosthetic) 0.47     MV mean gradient 11 mmHg    MV valve area p 1/2 method 3.52 cm2    MV valve area by continuity eq 1.18 cm2    E/A ratio 0.79     E wave deceleration time 181.51 msec    LVOT diameter 2.02 cm    LVOT area 3.2 cm2    LVOT peak jennifer 1.33 m/s    LVOT peak " VTI 27.70 cm    Ao peak nikita 3.04 m/s    Ao VTI 59.3 cm    RVOT peak nikita 0.79 m/s    RVOT peak VTI 17.4 cm    LVOT stroke volume 88.73 cm3    AV peak gradient 37 mmHg    MV peak gradient 18 mmHg    PV mean gradient 1.15 mmHg    MV Peak E Nikita 1.56 m/s    TR Max Nikita 3.33 m/s    MV VTI 75.5 cm    MV stenosis pressure 1/2 time 62.46 ms    MV Peak A Nikita 1.97 m/s    LV Systolic Volume 51.59 mL    LV Systolic Volume Index 23.1 mL/m2    LV Diastolic Volume 96.00 mL    LV Diastolic Volume Index 43.05 mL/m2    LA Volume Index 36.6 mL/m2    LV Mass Index 108 g/m2    RA Major Axis 4.46 cm    Left Atrium Minor Axis 6.42 cm    Left Atrium Major Axis 5.73 cm    Triscuspid Valve Regurgitation Peak Gradient 44 mmHg    LA Volume Index (Mod) 41.0 mL/m2    LA volume (mod) 91.32 cm3    RA Width 3.59 cm    Right Atrial Pressure (from IVC) 3 mmHg    EF 50 %    TV resting pulmonary artery pressure 47 mmHg    Narrative    · The left ventricle is normal in size with concentric remodeling and low   normal systolic function.  · Mild left atrial enlargement.  · Grade I left ventricular diastolic dysfunction.  · The estimated PA systolic pressure is 47 mmHg.  · Normal right ventricular size with normal right ventricular systolic   function.  · There is pulmonary hypertension.  · Normal central venous pressure (3 mmHg).  · There is a bileaflet tilting disc mechanical aortic valve present.   Prosthetic aortic valve is normal.  · The aortic valve mean gradient is 19 mmHg with a dimensionless index of   0.47.  · The estimated ejection fraction is 50%.  · The mean diastolic gradient across the mitral valve is 11 mmHg at a   heart rate of 76 bpm.  · There is mild to moderate mitral stenosis.  · Mild to moderate tricuspid regurgitation.      , EKG: reviewed, and X-Ray: CXR: X-Ray Chest 1 View (CXR): No results found for this visit on 02/16/24. and X-Ray Chest PA and Lateral (CXR): No results found for this visit on 02/16/24.  Assessment and Plan:    Patient with mechanical AVR, being bridged with Lovenox. Can f/u in clinic.    * Preoperative clearance  See management plan detailed above.      Atrial fibrillation  Monitor.    ESRD (end stage renal disease) on dialysis  Per Nephrology mgt.    History of mechanical aortic valve replacement   54 y.o. male pt with PAF, ASD closure at age 8 ([facility]), HFpEF, s/p AVR with a 22 mm mechanical valve and TV annuloplasty with a 28 mm ring 3/17, HTN, PHTN, Mitral stenosis, SVT, EP performed RFA (3/13/17) , ESRD presents to the hospital as a direct admit for bridging of coumadin with heparin to have penile prosthesis surgery performed by Urology (Dr. Shirley) on 02/20/2024. Patient denies CP, angina or anginal equivalent.    Post surgery heparin and coumadin to start once ok with surgeon.    2/20/24  -INR 1.2 today  -CT from yesterday reviewed, + bladder clot with slightly increased hydronephrosis within the right kidney with high density thought to reflect blood products.  -Abd pain improved this AM  -Recommend resumption of Coumadin this AM and resumption of heparin bridge tmw, will discuss with primary team    2/21/24  -INR 1.6  -On Lovenox bridge, Coumadin resumed yesterday            VTE Risk Mitigation (From admission, onward)           Ordered     warfarin tablet 7.5 mg  Once per day on Mon Wed Fri 02/20/24 1015     warfarin split tablet 11.25 mg  Every Tues, Thurs, Sat, Sun         02/20/24 1015     enoxaparin injection 100 mg  Every 24 hours         02/20/24 1155     Reason for No Pharmacological VTE Prophylaxis  Once        Comments: Pending CoAg levels for heparin bridge   Question:  Reasons:  Answer:  Physician Provided (leave comment)    02/16/24 2116     Place FRANDY hose  Until discontinued         02/16/24 2018     IP VTE HIGH RISK PATIENT  Once         02/16/24 2018     Place sequential compression device  Until discontinued         02/16/24 2018                    Radha Slaughter,  LANG  Cardiology  [facility] - Telemetry ([facility])

## 2024-02-21 NOTE — SUBJECTIVE & OBJECTIVE
Review of Systems   Constitutional: Negative.   HENT: Negative.     Eyes: Negative.    Respiratory: Negative.     Endocrine: Negative.    Hematologic/Lymphatic: Negative.    Skin: Negative.    Gastrointestinal:  Positive for abdominal pain.   Genitourinary:  Positive for flank pain.   Neurological: Negative.    Psychiatric/Behavioral: Negative.     Allergic/Immunologic: Negative.      Objective:     Vital Signs (Most Recent):  Temp: 98 °F (36.7 °C) (02/21/24 0801)  Pulse: 78 (02/21/24 0801)  Resp: 18 (02/21/24 0801)  BP: (!) 105/54 (02/21/24 0801)  SpO2: 97 % (02/21/24 0801) Vital Signs (24h Range):  Temp:  [97.7 °F (36.5 °C)-98.5 °F (36.9 °C)] 98 °F (36.7 °C)  Pulse:  [71-91] 78  Resp:  [18-19] 18  SpO2:  [92 %-97 %] 97 %  BP: ()/(53-63) 105/54     Weight: 98.2 kg (216 lb 7.9 oz)  Body mass index is 27.8 kg/m².     SpO2: 97 %       No intake or output data in the 24 hours ending 02/21/24 1331    Lines/Drains/Airways       Central Venous Catheter Line  Duration                  Hemodialysis AV Graft 07/04/20 0701 Left upper arm 1327 days              Peripheral Intravenous Line  Duration                  Peripheral IV - Single Lumen 02/16/24 0953 20 G Anterior;Right Forearm 5 days                       Physical Exam  Vitals and nursing note reviewed.   Constitutional:       General: He is not in acute distress.     Appearance: Normal appearance. He is well-developed. He is not diaphoretic.   HENT:      Head: Normocephalic and atraumatic.   Eyes:      General:         Right eye: No discharge.         Left eye: No discharge.      Pupils: Pupils are equal, round, and reactive to light.   Cardiovascular:      Rate and Rhythm: Normal rate and regular rhythm.      Heart sounds: Normal heart sounds, S1 normal and S2 normal. No murmur heard.     Comments: +mechanical click  Pulmonary:      Effort: Pulmonary effort is normal. No respiratory distress.      Breath sounds: Normal breath sounds. No wheezing or rales.  "  Abdominal:      General: There is no distension.   Musculoskeletal:      Right lower leg: No edema.      Left lower leg: No edema.   Skin:     General: Skin is warm and dry.      Findings: No erythema.   Neurological:      General: No focal deficit present.      Mental Status: He is alert and oriented to person, place, and time.   Psychiatric:         Mood and Affect: Mood normal.         Behavior: Behavior normal.            Significant Labs: CMP   Recent Labs   Lab 02/21/24  1040     138   K 4.6  4.6   CL 97  96   CO2 25  26   GLU 97  98   BUN 72*  73*   CREATININE 16.7*  16.7*   CALCIUM 9.9  9.8   ALBUMIN 2.8*   ANIONGAP 16  16   , CBC   Recent Labs   Lab 02/19/24  1425 02/20/24  1502 02/21/24  1040   WBC 8.94 9.42 7.57   HGB 10.2* 11.3* 10.2*   HCT 30.4* 34.7* 31.0*    159 137*   , Troponin No results for input(s): "TROPONINI" in the last 48 hours., and All pertinent lab results from the last 24 hours have been reviewed.    Significant Imaging: Echocardiogram: Transthoracic echo (TTE) complete (Cupid Only):   Results for orders placed or performed during the hospital encounter of 04/27/23   Echo   Result Value Ref Range    BSA 2.24 m2    LV LATERAL E/E' RATIO 19.50 m/s    LA WIDTH 3.50 cm    Left Ventricular Outflow Tract Mean Velocity 1.01 cm/s    Left Ventricular Outflow Tract Mean Gradient 4.38 mmHg    Pulmonary Valve Mean Velocity 0.77 m/s    TDI LATERAL 0.08 m/s    PV PEAK VELOCITY 1.25 cm/s    LVIDd 4.57 3.5 - 6.0 cm    IVS 1.45 (A) 0.6 - 1.1 cm    Posterior Wall 1.25 (A) 0.6 - 1.1 cm    Ao root annulus 3.89 cm    LVIDs 3.52 2.1 - 4.0 cm    FS 23 28 - 44 %    LA volume 81.61 cm3    Sinus 2.73 cm    STJ 2.84 cm    LV mass 240.88 g    LA size 4.53 cm    RVDD 2.69 cm    Left Ventricle Relative Wall Thickness 0.55 cm    AV mean gradient 19 mmHg    AV valve area 1.50 cm2    AV Velocity Ratio 0.44     AV index (prosthetic) 0.47     MV mean gradient 11 mmHg    MV valve area p 1/2 method " 3.52 cm2    MV valve area by continuity eq 1.18 cm2    E/A ratio 0.79     E wave deceleration time 181.51 msec    LVOT diameter 2.02 cm    LVOT area 3.2 cm2    LVOT peak nikita 1.33 m/s    LVOT peak VTI 27.70 cm    Ao peak nikita 3.04 m/s    Ao VTI 59.3 cm    RVOT peak nikita 0.79 m/s    RVOT peak VTI 17.4 cm    LVOT stroke volume 88.73 cm3    AV peak gradient 37 mmHg    MV peak gradient 18 mmHg    PV mean gradient 1.15 mmHg    MV Peak E Nikita 1.56 m/s    TR Max Nikita 3.33 m/s    MV VTI 75.5 cm    MV stenosis pressure 1/2 time 62.46 ms    MV Peak A Nikita 1.97 m/s    LV Systolic Volume 51.59 mL    LV Systolic Volume Index 23.1 mL/m2    LV Diastolic Volume 96.00 mL    LV Diastolic Volume Index 43.05 mL/m2    LA Volume Index 36.6 mL/m2    LV Mass Index 108 g/m2    RA Major Axis 4.46 cm    Left Atrium Minor Axis 6.42 cm    Left Atrium Major Axis 5.73 cm    Triscuspid Valve Regurgitation Peak Gradient 44 mmHg    LA Volume Index (Mod) 41.0 mL/m2    LA volume (mod) 91.32 cm3    RA Width 3.59 cm    Right Atrial Pressure (from IVC) 3 mmHg    EF 50 %    TV resting pulmonary artery pressure 47 mmHg    Narrative    · The left ventricle is normal in size with concentric remodeling and low   normal systolic function.  · Mild left atrial enlargement.  · Grade I left ventricular diastolic dysfunction.  · The estimated PA systolic pressure is 47 mmHg.  · Normal right ventricular size with normal right ventricular systolic   function.  · There is pulmonary hypertension.  · Normal central venous pressure (3 mmHg).  · There is a bileaflet tilting disc mechanical aortic valve present.   Prosthetic aortic valve is normal.  · The aortic valve mean gradient is 19 mmHg with a dimensionless index of   0.47.  · The estimated ejection fraction is 50%.  · The mean diastolic gradient across the mitral valve is 11 mmHg at a   heart rate of 76 bpm.  · There is mild to moderate mitral stenosis.  · Mild to moderate tricuspid regurgitation.      , EKG: reviewed,  and X-Ray: CXR: X-Ray Chest 1 View (CXR): No results found for this visit on 02/16/24. and X-Ray Chest PA and Lateral (CXR): No results found for this visit on 02/16/24.

## 2024-02-21 NOTE — PROGRESS NOTES
Chief Complaint:  Erectile dysfunction, PAF, ASD, status post AVR repair, mitral stenosis, dialysis dependent, here for Coumadin bridge    HPI:   2/21/24- patient with some mild right flank pain, otherwise doing well, no void.  2/20/24- patient improved today, occasional hematuria still.  2/19/24- persistent and worsening pain on the right flank, hematuria continues.    2/17/24-  54-year-old gentleman well known to the service, admitted preoperatively for Coumadin bridge for placement of penile prosthesis.      Allergies:  Patient has no known allergies.    Medications:  See MAR    Review of Systems:  General: No fever, chills, fatigability, or weight loss.  Skin: No rashes, itching, or changes in color or texture of skin.  Chest: Denies HAY, cyanosis, wheezing, cough, and sputum production.  Abdomen: Appetite fine. No weight loss. Denies diarrhea, abdominal pain, hematemesis, or blood in stool.  Musculoskeletal: No joint stiffness or swelling. Denies back pain.  : As above.  All other review of systems negative.    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    PSH:   has a past surgical history that includes ASD repair; Cardiac valuve replacement (02/08/2017); Radiofrequency ablation (03/13/2017); Cardiac catheterization; AV Graft Creation (Left, 03/2017); Colonoscopy (N/A, 8/27/2019); Esophagogastroduodenoscopy (N/A, 8/27/2019); Removal of graft (Left, 7/2/2020); Colonoscopy (N/A, 9/3/2020); and Right heart catheterization (Right, 8/12/2021).    FamHx: family  history includes Anesthesia problems in his paternal uncle; Cancer in his mother; Diabetes in his paternal aunt and paternal aunt; Heart attack in his father; Heart failure in his paternal grandmother; Hypertension in his paternal aunt; Leukemia in his mother and paternal aunt; No Known Problems in his brother, sister, sister, and sister; Stroke in his paternal aunt; Suicide in his paternal uncle; Valvular heart disease in his maternal aunt.    SocHx:  reports that he has never smoked. He has never used smokeless tobacco. He reports that he does not drink alcohol and does not use drugs.      Physical Exam:  Vitals:    02/21/24 0751   BP:    Pulse: 91   Resp:    Temp:      General: A&Ox3, no apparent distress, no deformities  Neck: No masses, normal ROM  Lungs: normal inspiration, no use of accessory muscles  Heart: normal pulse, no arrhythmias  Abdomen: Soft, ND, acute pain to right flank.  Skin: The skin is warm and dry. No jaundice.  Ext: No c/c/e.    Labs/Studies:   Pending     Impression/Plan:     1. History of mechanical aortic valve replacement- cardiology on board, heparin drip stopped, Lovenox initiated.  Coumadin restarted 2/20/24, it has been held since 02/15/2024.  Once INR appropriate, patient cleared for discharge to home.    2. Chronic diastolic heart failure- CHF controlled, cardiology on board.    3. Essential hypertension- blood pressure stable, appreciate cardiology and hospital medicine assistance.    4. End-stage renal disease- nephrology consulted, patient is a MWF dialysis patient, dialysis later today.    5. Atrial fibrillation- control with beta-blocker and calcium channel blocker, current Coumadin bridge for anticoagulation.    6. Erectile dysfunction- have held IPP for now due to hematuria and flank pain, improved for now.  Will follow up as an outpatient to discuss further management options.    7. Gross hematuria- stable for now, hold on surgery.

## 2024-02-21 NOTE — PLAN OF CARE
A236/A236 MELVI White Jr. is a 54 y.o.male admitted on 2/16/2024 for Preoperative clearance   Code Status: Full Code MRN: [id number]   Review of patient's allergies indicates:  No Known Allergies  Past Medical History:   Diagnosis Date    Anemia     Aortic valve stenosis     s/p mechanical AVR    Atrial fibrillation     Atrial flutter     Cardiomyopathy     CHF (congestive heart failure)     Drug-induced erectile dysfunction     ESRD due to hypertension     HD M, W, F    ESRD on dialysis     GERD (gastroesophageal reflux disease)     Hepatitis B     Hyperlipidemia     Hypertension     Nightmares     Obesity     DANIEL (obstructive sleep apnea) 11/12/2019    Secondary hyperparathyroidism of renal origin     Supraventricular tachycardia     atrial tachycardia    Tachycardia     Valvular regurgitation     AI, TR      PRN meds    acetaminophen, 650 mg, Q4H PRN  acetaminophen, 650 mg, Q6H PRN  dextrose 10%, 12.5 g, PRN  dextrose 10%, 25 g, PRN  glucagon (human recombinant), 1 mg, PRN  glucose, 16 g, PRN  glucose, 24 g, PRN  HYDROmorphone, 1 mg, Q4H PRN  hydrOXYzine HCL, 25 mg, Q8H PRN  IRON SUCROSE IV ORDERABLE, 50 mg, Once PRN  melatonin, 6 mg, Nightly PRN  naloxone, 0.02 mg, PRN  ondansetron, 4 mg, Q8H PRN  oxyCODONE-acetaminophen, 2 tablet, Q4H PRN  polyethylene glycol, 17 g, Daily PRN  promethazine, 25 mg, Q6H PRN  simethicone, 1 tablet, QID PRN  sodium chloride 0.9%, 250 mL, PRN  sodium chloride 0.9%, 250 mL, PRN  sodium chloride 0.9%, 10 mL, PRN      Chart check completed. Will continue plan of care.      Orientation: oriented x 4  [place] Coma Scale Score: 15     Lead Monitored: Lead II Rhythm: normal sinus rhythm Frequency/Ectopy: frequent, PACs  Cardiac/Telemetry Box Number: [id number]  VTE Required Core Measure: Pharmacological prophylaxis initiated/maintained Last Bowel Movement: 02/20/24  Diet Adult Regular (IDDSI Level 7) Coumadin Restriction; Standard Tray  Voiding Characteristics: patient on  hemodialysis  Ky Score: 21  Fall Risk Score: 12  Accucheck []   Freq?      Lines/Drains/Airways       Central Venous Catheter Line  Duration                  Hemodialysis AV Graft 07/04/20 0701 Left upper arm 1327 days              Peripheral Intravenous Line  Duration                  Peripheral IV - Single Lumen 02/16/24 0953 20 G Anterior;Right Forearm 5 days

## 2024-02-21 NOTE — PROGRESS NOTES
02/21/24 1036   Required for all Hemodialysis Patients   Hepatitis Status positive   Treatment Type   Treatment Type Acute   Vital Signs   Temp 99.1 °F (37.3 °C)   Temp Source Axillary   Pulse 87   Heart Rate Source Monitor   Resp 20   SpO2 99 %   Pulse Oximetry Type Intermittent   Probe Placed On (Pulse Ox) Right:;finger   Device (Oxygen Therapy) room air   BP (!) 92/58   MAP (mmHg) 71   BP Location Right arm   BP Method Automatic   Patient Position Lying        Hemodialysis AV Graft 07/04/20 0701 Left upper arm   Placement Date/Time: 07/04/20 0701   Location: Left upper arm   Needle Size 15ga   Site Assessment Clean;Dry;Intact;No redness;No swelling;No warmth;No drainage   Patency Bruit;Thrill;Present   Status Accessed   Flows Good   Dressing Intervention Integrity maintained   Dressing Status Clean;Dry;Intact   Site Condition No complications   Dressing Gauze   Drainage Description Other (Comment)     3 hr I-HDTx initiated ...

## 2024-02-21 NOTE — PROGRESS NOTES
Pharmacy Brief Progress Note:    Patient educated on warfarin indication, side effects, and drug interactions. Discussed importance of medication compliance and INR monitoring and reviewed signs of abnormal bleeding. Patient refused educational handout. Patient expressed understanding and had no further questions.    Thank you for allowing us to participate in this patient's care.     Brenda Dickens 2/21/2024 3:27 PM

## 2024-02-22 ENCOUNTER — TELEPHONE (OUTPATIENT)
Dept: UROLOGY | Facility: CLINIC | Age: 54
End: 2024-02-22
Payer: MEDICARE

## 2024-02-22 VITALS
HEART RATE: 73 BPM | WEIGHT: 212.94 LBS | BODY MASS INDEX: 27.33 KG/M2 | OXYGEN SATURATION: 97 % | SYSTOLIC BLOOD PRESSURE: 136 MMHG | DIASTOLIC BLOOD PRESSURE: 63 MMHG | RESPIRATION RATE: 18 BRPM | HEIGHT: 74 IN | TEMPERATURE: 98 F

## 2024-02-22 LAB
BASOPHILS # BLD AUTO: 0.02 K/UL (ref 0–0.2)
BASOPHILS NFR BLD: 0.3 % (ref 0–1.9)
DIFFERENTIAL METHOD BLD: ABNORMAL
EOSINOPHIL # BLD AUTO: 0.2 K/UL (ref 0–0.5)
EOSINOPHIL NFR BLD: 2.1 % (ref 0–8)
ERYTHROCYTE [DISTWIDTH] IN BLOOD BY AUTOMATED COUNT: 15.5 % (ref 11.5–14.5)
HCT VFR BLD AUTO: 36.5 % (ref 40–54)
HGB BLD-MCNC: 11.8 G/DL (ref 14–18)
IMM GRANULOCYTES # BLD AUTO: 0.01 K/UL (ref 0–0.04)
IMM GRANULOCYTES NFR BLD AUTO: 0.1 % (ref 0–0.5)
INR PPP: 1.9 (ref 0.8–1.2)
LYMPHOCYTES # BLD AUTO: 1.1 K/UL (ref 1–4.8)
LYMPHOCYTES NFR BLD: 16.1 % (ref 18–48)
MCH RBC QN AUTO: 34.2 PG (ref 27–31)
MCHC RBC AUTO-ENTMCNC: 32.3 G/DL (ref 32–36)
MCV RBC AUTO: 106 FL (ref 82–98)
MONOCYTES # BLD AUTO: 1 K/UL (ref 0.3–1)
MONOCYTES NFR BLD: 14.4 % (ref 4–15)
NEUTROPHILS # BLD AUTO: 4.8 K/UL (ref 1.8–7.7)
NEUTROPHILS NFR BLD: 67 % (ref 38–73)
NRBC BLD-RTO: 0 /100 WBC
PLATELET # BLD AUTO: 136 K/UL (ref 150–450)
PMV BLD AUTO: 10 FL (ref 9.2–12.9)
PROTHROMBIN TIME: 20.3 SEC (ref 9–12.5)
RBC # BLD AUTO: 3.45 M/UL (ref 4.6–6.2)
WBC # BLD AUTO: 7.09 K/UL (ref 3.9–12.7)

## 2024-02-22 PROCEDURE — 25000003 PHARM REV CODE 250: Performed by: UROLOGY

## 2024-02-22 PROCEDURE — 99239 HOSP IP/OBS DSCHRG MGMT >30: CPT | Mod: ,,, | Performed by: UROLOGY

## 2024-02-22 PROCEDURE — 85025 COMPLETE CBC W/AUTO DIFF WBC: CPT | Performed by: STUDENT IN AN ORGANIZED HEALTH CARE EDUCATION/TRAINING PROGRAM

## 2024-02-22 PROCEDURE — 36415 COLL VENOUS BLD VENIPUNCTURE: CPT | Performed by: UROLOGY

## 2024-02-22 PROCEDURE — 85610 PROTHROMBIN TIME: CPT | Performed by: UROLOGY

## 2024-02-22 RX ADMIN — LABETALOL HYDROCHLORIDE 200 MG: 200 TABLET, FILM COATED ORAL at 09:02

## 2024-02-22 RX ADMIN — NEPHROCAP 1 CAPSULE: 1 CAP ORAL at 09:02

## 2024-02-22 RX ADMIN — DILTIAZEM HYDROCHLORIDE 420 MG: 240 CAPSULE, COATED, EXTENDED RELEASE ORAL at 09:02

## 2024-02-22 RX ADMIN — CALCIUM ACETATE 2001 MG: 667 CAPSULE ORAL at 09:02

## 2024-02-22 RX ADMIN — PANTOPRAZOLE SODIUM 40 MG: 40 TABLET, DELAYED RELEASE ORAL at 09:02

## 2024-02-22 NOTE — SUBJECTIVE & OBJECTIVE
Review of Systems  Objective:     Vital Signs (Most Recent):  Temp: 97.9 °F (36.6 °C) (02/22/24 0712)  Pulse: 73 (02/22/24 0712)  Resp: 18 (02/22/24 0712)  BP: 136/63 (02/22/24 0712)  SpO2: 97 % (02/22/24 0712) Vital Signs (24h Range):  Temp:  [97.5 °F (36.4 °C)-98.8 °F (37.1 °C)] 97.9 °F (36.6 °C)  Pulse:  [66-86] 73  Resp:  [18-20] 18  SpO2:  [97 %-99 %] 97 %  BP: ()/(55-65) 136/63     Weight: 96.6 kg (212 lb 15.4 oz)  Body mass index is 27.34 kg/m².    Intake/Output Summary (Last 24 hours) at 2/22/2024 1303  Last data filed at 2/21/2024 1336  Gross per 24 hour   Intake 950 ml   Output 3603 ml   Net -2653 ml         Physical Exam       Constitutional:       General: He is awake. He is not in acute distress.     Appearance: Normal appearance. He is well-developed and well-groomed. He is not ill-appearing, toxic-appearing or diaphoretic.   HENT:      Head: Normocephalic and atraumatic.   Eyes:      Extraocular Movements: Extraocular movements intact.      Conjunctiva/sclera: Conjunctivae normal.   Cardiovascular:      Rate and Rhythm: Normal rate and regular rhythm.      Pulses: Normal pulses.      Heart sounds: Murmur heard.      Arteriovenous access: Left arteriovenous access is present.     Comments: +bruit and thrill noted to LLE.  Pulmonary:      Effort: Pulmonary effort is normal.      Breath sounds: Normal breath sounds. No decreased breath sounds, wheezing, rhonchi or rales.   Abdominal:      General: Bowel sounds are normal.      Palpations: Abdomen is soft.      Tenderness: There is no abdominal tenderness.   Musculoskeletal:      Cervical back: Normal range of motion and neck supple.      Right lower leg: No edema.      Left lower leg: No edema.      Comments: 5/5 strength throughout   Skin:     General: Skin is warm and dry.      Capillary Refill: Capillary refill takes less than 2 seconds.   Neurological:      General: No focal deficit present.      Mental Status: He is alert and oriented to  person, place, and time. Mental status is at baseline.      GCS: GCS eye subscore is 4. GCS verbal subscore is 5. GCS motor subscore is 6.      Cranial Nerves: Cranial nerves 2-12 are intact.      Motor: Motor function is intact.   Psychiatric:         Mood and Affect: Mood normal.         Behavior: Behavior normal. Behavior is cooperative.      Significant Labs: All pertinent labs within the past 24 hours have been reviewed.  CBC:   Recent Labs   Lab 02/20/24  1502 02/21/24  1040 02/22/24  0512   WBC 9.42 7.57 7.09   HGB 11.3* 10.2* 11.8*   HCT 34.7* 31.0* 36.5*    137* 136*     CMP:   Recent Labs   Lab 02/21/24  1040     138   K 4.6  4.6   CL 97  96   CO2 25  26   GLU 97  98   BUN 72*  73*   CREATININE 16.7*  16.7*   CALCIUM 9.9  9.8   ALBUMIN 2.8*   ANIONGAP 16  16       Significant Imaging:

## 2024-02-22 NOTE — PROGRESS NOTES
Chief Complaint:  Erectile dysfunction, PAF, ASD, status post AVR repair, mitral stenosis, dialysis dependent, here for Coumadin bridge    HPI:   2/22/24- currently stable and the patient wants to go home.  2/21/24- patient with some mild right flank pain, otherwise doing well, no void.  2/20/24- patient improved today, occasional hematuria still.  2/19/24- persistent and worsening pain on the right flank, hematuria continues.    2/17/24-  54-year-old gentleman well known to the service, admitted preoperatively for Coumadin bridge for placement of penile prosthesis.      Allergies:  Patient has no known allergies.    Medications:  See MAR    Review of Systems:  General: No fever, chills, fatigability, or weight loss.  Skin: No rashes, itching, or changes in color or texture of skin.  Chest: Denies HAY, cyanosis, wheezing, cough, and sputum production.  Abdomen: Appetite fine. No weight loss. Denies diarrhea, abdominal pain, hematemesis, or blood in stool.  Musculoskeletal: No joint stiffness or swelling. Denies back pain.  : As above.  All other review of systems negative.    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    PSH:   has a past surgical history that includes ASD repair; Cardiac valuve replacement (02/08/2017); Radiofrequency ablation (03/13/2017); Cardiac catheterization; AV Graft Creation (Left, 03/2017); Colonoscopy (N/A, 8/27/2019); Esophagogastroduodenoscopy (N/A, 8/27/2019); Removal of graft (Left, 7/2/2020); Colonoscopy (N/A, 9/3/2020); and Right  heart catheterization (Right, 8/12/2021).    FamHx: family history includes Anesthesia problems in his paternal uncle; Cancer in his mother; Diabetes in his paternal aunt and paternal aunt; Heart attack in his father; Heart failure in his paternal grandmother; Hypertension in his paternal aunt; Leukemia in his mother and paternal aunt; No Known Problems in his brother, sister, sister, and sister; Stroke in his paternal aunt; Suicide in his paternal uncle; Valvular heart disease in his maternal aunt.    SocHx:  reports that he has never smoked. He has never used smokeless tobacco. He reports that he does not drink alcohol and does not use drugs.      Physical Exam:  Vitals:    02/22/24 0712   BP: 136/63   Pulse: 73   Resp: 18   Temp: 97.9 °F (36.6 °C)     General: A&Ox3, no apparent distress, no deformities  Neck: No masses, normal ROM  Lungs: normal inspiration, no use of accessory muscles  Heart: normal pulse, no arrhythmias  Abdomen: Soft, ND, acute pain to right flank.  Skin: The skin is warm and dry. No jaundice.  Ext: No c/c/e.    Labs/Studies:   INR 1.9 2/24    Impression/Plan:     1. History of mechanical aortic valve replacement- cardiology on board, heparin drip stopped, Lovenox initiated.  Coumadin restarted 2/20/24, it has been held since 02/15/2024.  INR still not completely appropriate, patient is adamant that he wants to leave though.  Patient is aware of his risks for clot formation and death.    2. Chronic diastolic heart failure- CHF controlled, cardiology on board.    3. Essential hypertension- blood pressure stable, appreciate cardiology and hospital medicine assistance.    4. End-stage renal disease- nephrology consulted, patient is a MWF dialysis patient, he had dialysis yesterday.    5. Atrial fibrillation- control with beta-blocker and calcium channel blocker, current Coumadin bridge for anticoagulation.    6. Erectile dysfunction- have held IPP for now due to hematuria and flank pain,  improved for now.  Will follow up as an outpatient to discuss further management options.    7. Gross hematuria- stable for now, hold on surgery.

## 2024-02-22 NOTE — TELEPHONE ENCOUNTER
----- Message from Bonifacio Shirley MD sent at 2/22/2024 12:58 PM CST -----  Return to see me in a month

## 2024-02-22 NOTE — PLAN OF CARE
A236/A236 EMLVI White Jr. is a 54 y.o.male admitted on 2/16/2024 for Preoperative clearance   Code Status: Full Code MRN: [id number]   Review of patient's allergies indicates:  No Known Allergies  Past Medical History:   Diagnosis Date    Anemia     Aortic valve stenosis     s/p mechanical AVR    Atrial fibrillation     Atrial flutter     Cardiomyopathy     CHF (congestive heart failure)     Drug-induced erectile dysfunction     ESRD due to hypertension     HD M, W, F    ESRD on dialysis     GERD (gastroesophageal reflux disease)     Hepatitis B     Hyperlipidemia     Hypertension     Nightmares     Obesity     DANIEL (obstructive sleep apnea) 11/12/2019    Secondary hyperparathyroidism of renal origin     Supraventricular tachycardia     atrial tachycardia    Tachycardia     Valvular regurgitation     AI, TR      PRN meds    acetaminophen, 650 mg, Q4H PRN  acetaminophen, 650 mg, Q6H PRN  dextrose 10%, 12.5 g, PRN  dextrose 10%, 25 g, PRN  glucagon (human recombinant), 1 mg, PRN  glucose, 16 g, PRN  glucose, 24 g, PRN  HYDROmorphone, 1 mg, Q4H PRN  hydrOXYzine HCL, 25 mg, Q8H PRN  IRON SUCROSE IV ORDERABLE, 50 mg, Once PRN  melatonin, 6 mg, Nightly PRN  naloxone, 0.02 mg, PRN  ondansetron, 4 mg, Q8H PRN  oxyCODONE-acetaminophen, 2 tablet, Q4H PRN  polyethylene glycol, 17 g, Daily PRN  promethazine, 25 mg, Q6H PRN  simethicone, 1 tablet, QID PRN  sodium chloride 0.9%, 250 mL, PRN  sodium chloride 0.9%, 250 mL, PRN  sodium chloride 0.9%, 10 mL, PRN      AVS Discharge instructions received and reviewed with pt.  Pt voiced understanding and all questions answered to satisfaction.  Stressed importance to making and keeping all follow up appointments.  IV d/c'd with tip intact, pressure dressing applied.  Pt will call when ready to be transported to front of hospital via w/c to be discharged home.      Orientation: oriented x 4  [place] Coma Scale Score: 15     Lead Monitored: Lead II Rhythm: normal sinus rhythm  Frequency/Ectopy: frequent, PACs  Cardiac/Telemetry Box Number: [id number]  VTE Required Core Measure: Pharmacological prophylaxis initiated/maintained Last Bowel Movement: 02/20/24  Diet Adult Regular (IDDSI Level 7) Coumadin Restriction; Standard Tray  Diet Adult Regular  Voiding Characteristics: patient on hemodialysis  Ky Score: 21  Fall Risk Score: 12  Accucheck []   Freq?      Lines/Drains/Airways       Central Venous Catheter Line  Duration                  Hemodialysis AV Graft 07/04/20 0701 Left upper arm 1328 days              Peripheral Intravenous Line  Duration                  Peripheral IV - Single Lumen 02/16/24 0953 20 G Anterior;Right Forearm 6 days

## 2024-02-22 NOTE — PLAN OF CARE
[facility] - Telemetry (Hospital)  Discharge Final Note    Primary Care Provider: Boston Nevarez MD    Expected Discharge Date: 2/22/2024    Final Discharge Note (most recent)       Final Note - 02/22/24 1330          Final Note    Assessment Type Final Discharge Note     Anticipated Discharge Disposition Home or Self Care     Hospital Resources/Appts/Education Provided Appointments scheduled and added to AVS        Post-Acute Status    Coverage Medicare A & B     Discharge Delays None known at this time                     Important Message from Medicare             DC Disposition: home with family  Family Notified: patient at bedside  Transportation: personal transportation    Patient had no equipment or placement needs from .     Patient has PCP hospital follow up with Dr. Nevarez, on 2/29/24 at 1:20 pm.

## 2024-02-22 NOTE — DISCHARGE SUMMARY
Discharge Summary  Urology    Admit Date: 2/16/2024    Discharge Date: 02/22/2024    Discharge Attending Physician: Bonifacio Shirley MD     Diagnoses:  Active Hospital Problems    Diagnosis  POA    *Preoperative clearance [Z01.818]  Not Applicable    GERD (gastroesophageal reflux disease) [K21.9]  Unknown    Chronic hepatitis B [B18.1]  Yes    Hypertriglyceridemia [E78.1]  Yes    Atrial fibrillation [I48.91]  Yes    Chronic diastolic heart failure [I50.32]  Yes     Non ischemic DCM      ESRD (end stage renal disease) on dialysis [N18.6, Z99.2]  Not Applicable     Chronic    History of mechanical aortic valve replacement [Z95.2]  Not Applicable    Essential hypertension [I10]  Yes      Resolved Hospital Problems   No resolved problems to display.       Hospital Course:  Admitted on 2/16/24 right Coumadin bridge for future insertion of inflatable penile prosthesis.  Patient has stopped Coumadin the day before, reported for admission after dialysis.  Patient did well until hospital day 2 When he began to have right flank pain and hematuria.  CT scan demonstrated possible clots within the bladder in the collecting system on the right no perirenal hematoma.  Hospital day 3 Pain continued, repeat CT scan demonstrated stability but at that point it was deemed necessary to hold heparin per our department in cardiology.  Hospital day 4. Patient was started on Lovenox bridge.  During his hospital stay nephrology was consulted and patient underwent dialysis on 02/19 and 2/21.  Cardiology was on board to assist with his Coumadin bridge, patient refused to be replaced back on heparin drip but was compliant with the Lovenox.  Once patient bled we deemed it necessary to cancel his surgery, confirmed by Cardiology, patient was restarted on Coumadin on 2/20.  Possible medicine was on board assisting with overall medical care during the entire procedure.  On 02/22/24, patient's INR had increased to 1.9.  At which point we would  recommended staying on a Lovenox bridge until his INR had gotten above 2.0.  Patient was adamant that he could manage this at home with close follow-up, as he is an active member of the [facility].  Therefore understanding the risk for persistent bleed, clot formation and even death, patient was discharged to home in stable condition.  Of note patient's admitting INR was 1.7.      This patient was seen and examined on the date of discharge and determined to be suitable for discharge.    Consults: Cardiology, Hospital Medicine, and Nephrology    Pertinent Diagnostic Studies and Procedures:  CT    Discharged Condition: stable    Disposition: Home or Self Care    Follow-up Plans:  with previous scheduled specialists, one month with me    Recent Labs:  Recent Results (from the past 336 hour(s))   CBC Auto Differential    Collection Time: 02/22/24  5:12 AM   Result Value Ref Range    WBC 7.09 3.90 - 12.70 K/uL    Hemoglobin 11.8 (L) 14.0 - 18.0 g/dL    Hematocrit 36.5 (L) 40.0 - 54.0 %    Platelets 136 (L) 150 - 450 K/uL   CBC auto differential    Collection Time: 02/21/24 10:40 AM   Result Value Ref Range    WBC 7.57 3.90 - 12.70 K/uL    Hemoglobin 10.2 (L) 14.0 - 18.0 g/dL    Hematocrit 31.0 (L) 40.0 - 54.0 %    Platelets 137 (L) 150 - 450 K/uL   CBC auto differential    Collection Time: 02/20/24  3:02 PM   Result Value Ref Range    WBC 9.42 3.90 - 12.70 K/uL    Hemoglobin 11.3 (L) 14.0 - 18.0 g/dL    Hematocrit 34.7 (L) 40.0 - 54.0 %    Platelets 159 150 - 450 K/uL     Recent Results (from the past 336 hour(s))   Basic metabolic panel    Collection Time: 02/21/24 10:40 AM   Result Value Ref Range    Sodium 138 136 - 145 mmol/L    Potassium 4.6 3.5 - 5.1 mmol/L    Chloride 97 95 - 110 mmol/L    CO2 25 23 - 29 mmol/L    BUN 72 (H) 6 - 20 mg/dL    Creatinine 16.7 (H) 0.5 - 1.4 mg/dL    Calcium 9.9 8.7 - 10.5 mg/dL    Anion Gap 16 8 - 16 mmol/L   Basic metabolic panel    Collection Time: 02/16/24  9:23 PM   Result  Value Ref Range    Sodium 142 136 - 145 mmol/L    Potassium 4.0 3.5 - 5.1 mmol/L    Chloride 97 95 - 110 mmol/L    CO2 26 23 - 29 mmol/L    BUN 42 (H) 6 - 20 mg/dL    Creatinine 11.4 (H) 0.5 - 1.4 mg/dL    Calcium 9.5 8.7 - 10.5 mg/dL    Anion Gap 19 (H) 8 - 16 mmol/L     Lab Results   Component Value Date    HGBA1C 4.8 09/20/2019       Discharge Medication List:     Medication List        CHANGE how you take these medications      warfarin 7.5 MG tablet  Commonly known as: COUMADIN  Take 2 tablets by mouth on Tuesday, Thursdays, and Saturday; and 1 tablet on all other days of the week or as directed by coumadin clinic.  What changed: additional instructions            CONTINUE taking these medications      albuterol 90 mcg/actuation inhaler  Commonly known as: VENTOLIN HFA  Inhale 2 puffs into the lungs every 6 (six) hours as needed for Wheezing or Shortness of Breath. Rescue     amLODIPine 10 MG tablet  Commonly known as: NORVASC  Take 1 tablet by mouth daily     ascorbic acid (vitamin C) 500 MG tablet  Commonly known as: VITAMIN C  Take 1 tablet (500 mg total) by mouth 2 (two) times daily.     b complex vitamins tablet     calcium acetate(phosphat bind) 667 mg tablet  Commonly known as: PHOSLO  Take 3 capsules by mouth three times daily with meals AND take 1 capsule by mouth twice daily with snacks     diltiaZEM 420 MG Cs24  Commonly known as: TIADYLT ER  Take 1 capsule (420 mg total) by mouth once daily.     epoetin vikki 10,000 unit/mL injection  Commonly known as: PROCRIT  Inject 1 mL (10,000 Units total) into the skin every Mon, Wed, Fri. To be given with HD     ferrous sulfate 324 mg (65 mg iron) Tbec     fish oil-omega-3 fatty acids 300-1,000 mg capsule     hydrALAZINE 100 MG tablet  Commonly known as: APRESOLINE  Take 1 tablet by mouth three times a day including dialysis days     * hydrOXYzine HCL 25 MG tablet  Commonly known as: ATARAX  take one tablet by mouth daily as needed for itching     *  hydrOXYzine HCL 25 MG tablet  Commonly known as: ATARAX  Take 1 tablet by mouth daily as needed for itching     labetaloL 200 MG tablet  Commonly known as: NORMODYNE  Take 1 tablet (200 mg total) by mouth 3 (three) times daily.     lisinopriL 40 MG tablet  Commonly known as: PRINIVIL,ZESTRIL  Take 1 tablet (40 mg total) by mouth once daily.     multivitamin with minerals tablet     omega-3 fatty acids-vitamin E 1,000 mg Cap  Commonly known as: Fish OiL  Take 1 capsule by mouth once daily.     pantoprazole 40 MG tablet  Commonly known as: PROTONIX  Take 1 tablet by mouth once daily     PEP INJECTION (FOR RX USE)  Inject 0.3 ml as directed     For compounding pharmacy use:   Add PAPAVERINE 30 mcg  Add PHENTOLAMINE 10 mg  Add ALPROSTADIL 100 mcg     PERITONEAL DIALYSIS SOLUTION IP     ETHAN-RACQUEL 0.8 mg Tab  Generic drug: B complex-vitamin C-folic acid  Take 1 tablet by mouth once daily     sildenafiL 100 MG tablet  Commonly known as: VIAGRA  Take 1 tablet (100 mg total) by mouth daily as needed for Erectile Dysfunction.     SPIKEVAX 0124-4177(12Y UP)(PF) 50 mcg/0.5 mL injection  Generic drug: COVID [id number](12up)(andu)(PF)  Inject into the muscle.     tenofovir disoproxil fumarate 300 mg Tab  Commonly known as: VIREAD  TAKE 1 TABLET BY MOUTH ONCE WEEKLY     vitamin D 1000 units Tab  Commonly known as: VITAMIN D3  Take 1 tablet by mouth daily           * This list has 2 medication(s) that are the same as other medications prescribed for you. Read the directions carefully, and ask your doctor or other care provider to review them with you.                    Discharge Procedure Orders   Diet Adult Regular     Notify your health care provider if you experience any of the following:  severe uncontrolled pain     Notify your health care provider if you experience any of the following:  persistent nausea and vomiting or diarrhea     Notify your health care provider if you experience any of the following:  temperature >100.4      Activity as tolerated       An excess of 30 minutes was spent discharging this patient.

## 2024-02-22 NOTE — PROGRESS NOTES
[facility] Medicine  Progress Note    Patient Name: Isidro White Jr.  MRN: [id number]  Patient Class: IP- Inpatient   Admission Date: 2/16/2024  Length of Stay: 6 days  Attending Physician: Bonifacio Shirley MD  Primary Care Provider: Boston Nevarez MD        Subjective:     Principal Problem:Preoperative clearance        HPI:  Isidro White Jr. is a 54 y.o. 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 Presents to the hospital as a direct admit for bridging of coumadin with heparin to have penile prosthesis surgery performed by Urology (Dr. Shirley) on 02/20/2024.  Patient receives HD on M/W/F and states he received his full dialysis session prior to arrival to the hospital.  States he does not produce urine. Reports last dose of Coumadin was yesterday.  Denies any complaints at this time.  Hospital Medicine consulted to manage comorbidities/medical problems and to manage heparin drip.    PCP: Boston Nevarez      Overview/Hospital Course:  Isidro White Jr. is a 54 y.o. male with a PMH  has a past medical history of Anemia, Aortic valve stenosis, Atrial fibrillation, Atrial flutter, Cardiomyopathy, CHF (congestive heart failure), Drug-induced erectile dysfunction, ESRD due to hypertension, ESRD on dialysis, GERD (gastroesophageal reflux disease), Hepatitis B, Hyperlipidemia, Hypertension, Nightmares, Obesity, DANIEL (obstructive sleep apnea) (11/12/2019), Secondary hyperparathyroidism of renal origin,  Supraventricular tachycardia, Tachycardia, and Valvular regurgitation. Presents to the hospital as a direct admit for bridging of coumadin with heparin to have penile prosthesis surgery performed by Urology (Dr. Shirley) on 02/20/2024.  Patient receives HD on M/W/F and states he received his full dialysis session prior to arrival to the hospital.  States he does not produce urine. Reports last dose of Coumadin was yesterday.  Denies any complaints at this time.  Hospital Medicine consulted to manage comorbidities/medical problems and to manage heparin drip.    02/19/2024   Patient initially presented to undergo elective penile prosthesis implantation, Urology primary on patient, hospital medicine consulted for medical management.    Patient on Coumadin outpatient for mechanical aortic valve prosthesis, bridged to heparin drip for urological intervention.    On 02/19/2024, patient continued to have persistent hematuria with drop in hemoglobin, heparin drip held per Cardiology, Urology plans to postpone penile prosthesis implantation for now, plans for clot evacuation tomorrow.  Follow up on stat CT abdomen pelvis.  Follow up on repeat CBC this afternoon.      02/20/2024   Initially urology plan for clot evacuation, however patient noted to have improvement in hematuria, no plans for clot evacuation at this point, resumed diet per Urology.    Given recent hematuria, Urology plans to hold penile prosthesis implantation at this point.    Plans to bridge heparin to Coumadin, however patient refused heparin drip, Lovenox initiated per Urology   Monitor labs    02/21/2024   Complaining of flank pain,/ suprapubic discomfort.  Remained afebrile, hemodynamically stable, denied any hematuria.    INR 1.6, currently on Lovenox bridging to Coumadin.    2/22/24  Denied acute issues overnight   INR trended up to 1.9   Primary team/urology plans to discharge patient with outpatient follow up   Patient very adamant on leaving today            Review of Systems  Objective:     Vital Signs (Most Recent):  Temp: 97.9 °F (36.6 °C) (02/22/24 0712)  Pulse: 73 (02/22/24 0712)  Resp: 18 (02/22/24 0712)  BP: 136/63 (02/22/24 0712)  SpO2: 97 % (02/22/24 0712) Vital Signs (24h Range):  Temp:  [97.5 °F (36.4 °C)-98.8 °F (37.1 °C)] 97.9 °F (36.6 °C)  Pulse:  [66-86] 73  Resp:  [18-20] 18  SpO2:  [97 %-99 %] 97 %  BP: ()/(55-65) 136/63     Weight: 96.6 kg (212 lb 15.4 oz)  Body mass index is 27.34 kg/m².    Intake/Output Summary (Last 24 hours) at 2/22/2024 1303  Last data filed at 2/21/2024 1336  Gross per 24 hour   Intake 950 ml   Output 3603 ml   Net -2653 ml         Physical Exam       Constitutional:       General: He is awake. He is not in acute distress.     Appearance: Normal appearance. He is well-developed and well-groomed. He is not ill-appearing, toxic-appearing or diaphoretic.   HENT:      Head: Normocephalic and atraumatic.   Eyes:      Extraocular Movements: Extraocular movements intact.      Conjunctiva/sclera: Conjunctivae normal.   Cardiovascular:      Rate and Rhythm: Normal rate and regular rhythm.      Pulses: Normal pulses.      Heart sounds: Murmur heard.      Arteriovenous access: Left arteriovenous access is present.     Comments: +bruit and thrill noted to LLE.  Pulmonary:      Effort: Pulmonary effort is normal.      Breath sounds: Normal breath sounds. No decreased breath sounds, wheezing, rhonchi or rales.   Abdominal:      General: Bowel sounds are normal.      Palpations: Abdomen is soft.      Tenderness: There is no abdominal tenderness.   Musculoskeletal:      Cervical back: Normal range of motion and neck supple.      Right lower leg: No edema.      Left lower leg: No edema.      Comments: 5/5 strength throughout   Skin:     General: Skin is warm and dry.      Capillary Refill: Capillary refill takes less than 2 seconds.   Neurological:      General: No focal deficit present.      Mental Status: He is alert and  oriented to person, place, and time. Mental status is at baseline.      GCS: GCS eye subscore is 4. GCS verbal subscore is 5. GCS motor subscore is 6.      Cranial Nerves: Cranial nerves 2-12 are intact.      Motor: Motor function is intact.   Psychiatric:         Mood and Affect: Mood normal.         Behavior: Behavior normal. Behavior is cooperative.      Significant Labs: All pertinent labs within the past 24 hours have been reviewed.  CBC:   Recent Labs   Lab 02/20/24  1502 02/21/24  1040 02/22/24  0512   WBC 9.42 7.57 7.09   HGB 11.3* 10.2* 11.8*   HCT 34.7* 31.0* 36.5*    137* 136*     CMP:   Recent Labs   Lab 02/21/24  1040     138   K 4.6  4.6   CL 97  96   CO2 25  26   GLU 97  98   BUN 72*  73*   CREATININE 16.7*  16.7*   CALCIUM 9.9  9.8   ALBUMIN 2.8*   ANIONGAP 16  16       Significant Imaging:          Assessment/Plan:      * Preoperative clearance  Admitted for heparin bridge from coumadin for scheduled penile prosthesis surgery to be performed by Dr. Shirley.  RCRI revealed class 4 risk with 15.0% 30 day risk of death, mi, cardiac arrest.  Plan:  -pre-op labs and imaging  -heparin bridge with nomogram  -manage co-morbidities   -cards consult for surgical clearance  -urology primary team    GERD (gastroesophageal reflux disease)  Chronic. Stable. Currently asymptomatic. Home medications include PPI/Antacids as needed.  Plan:  -Continue PPI/Antacids as needed         Hypertriglyceridemia  Patient is not chronically on statin.will not continue for now. Last Lipid Panel:   Lab Results   Component Value Date    CHOL 248 (H) 11/05/2020    HDL 42 11/05/2020    LDLCALC 164.8 (H) 11/05/2020    TRIG 206 (H) 11/05/2020    CHOLHDL 16.9 (L) 11/05/2020     Plan:  -Continue home medication  -low fat/low calorie diet        Chronic hepatitis B  Patient has chronic liver disease due to hepatitis b. Their liver disease is compensated. Hepatology has not been consulted. We will obtain daily CBC,  CMP, and INR.Will continue Tenofovir. Their most current Na-MELD is listed below.  MELD 3.0: 24 at 10/10/2023  9:27 AM  MELD-Na: 26 at 10/10/2023  9:27 AM  Calculated from:  Serum Creatinine: On dialysis. Using the maximum value.  Serum Sodium: 143 mmol/L (Using max of 137 mmol/L) at 10/10/2023  8:31 AM  Total Bilirubin: 0.5 mg/dL (Using min of 1 mg/dL) at 10/10/2023  8:31 AM  Serum Albumin: 3.3 g/dL at 10/10/2023  8:31 AM  INR(ratio): 1.8 at 10/10/2023  9:27 AM  Age at listing (hypothetical): 53 years  Sex: Male at 10/10/2023  9:27 AM        Atrial fibrillation  Patient with Paroxysmal (<7 days) atrial fibrillation which is controlled currently with Beta Blocker and Calcium Channel Blocker. Patient is currently in atrial fibrillation.[id number] Score: The patient doesn't have any registry metric data available. HASBLED Score: . Anticoagulation indicated. Anticoagulation done with coumadin, but bridging with heparin for scheduled surgical procedure .    Chronic diastolic heart failure  Patient is identified as having Diastolic (HFpEF) heart failure that is Chronic. CHF is currently controlled. Latest ECHO performed and demonstrates- Results for orders placed during the hospital encounter of 04/27/23    Echo    Interpretation Summary  · The left ventricle is normal in size with concentric remodeling and low normal systolic function.  · Mild left atrial enlargement.  · Grade I left ventricular diastolic dysfunction.  · The estimated PA systolic pressure is 47 mmHg.  · Normal right ventricular size with normal right ventricular systolic function.  · There is pulmonary hypertension.  · Normal central venous pressure (3 mmHg).  · There is a bileaflet tilting disc mechanical aortic valve present. Prosthetic aortic valve is normal.  · The aortic valve mean gradient is 19 mmHg with a dimensionless index of 0.47.  · The estimated ejection fraction is 50%.  · The mean diastolic gradient across the mitral valve is 11 mmHg at a  "heart rate of 76 bpm.  · There is mild to moderate mitral stenosis.  · Mild to moderate tricuspid regurgitation.  . Continue Beta Blocker and monitor clinical status closely. Monitor on telemetry. Patient is off CHF pathway.  Monitor strict Is&Os and daily weights.  Place on fluid restriction of 1.5 L. Continue to stress to patient importance of self efficacy and  on diet for CHF. Last BNP reviewed- and noted below No results for input(s): "BNP", "BNPTRIAGEBLO" in the last 168 hours..            ESRD (end stage renal disease) on dialysis  Creatine stable for now. BMP reviewed- noted Estimated Creatinine Clearance: 8.7 mL/min (A) (based on SCr of 11.4 mg/dL (H)). according to latest data. Based on current GFR, CKD stage is end stage.  Monitor UOP and serial BMP and adjust therapy as needed. Renally dose meds. Avoid nephrotoxic medications and procedures. Nephrology consult for management of HD orders.    History of mechanical aortic valve replacement  Plan:  -continue anticoagulation therpay with heparin while in hospital  -tele monitoring  -cards consulted for surgical clearance      Essential hypertension  Chronic, controlled. Latest blood pressure and vitals reviewed-     Temp:  [98.1 °F (36.7 °C)]   Pulse:  [76-78]   Resp:  [18]   BP: (106)/(59)   SpO2:  [94 %] .   Home meds for hypertension were reviewed and noted below.   Hypertension Medications               amLODIPine (NORVASC) 10 MG tablet Take 1 tablet by mouth daily    diltiaZEM (TIADYLT ER) 420 MG Cs24 Take 1 capsule (420 mg total) by mouth once daily.    hydrALAZINE (APRESOLINE) 100 MG tablet Take 1 tablet by mouth three times a day including dialysis days    labetaloL (NORMODYNE) 200 MG tablet Take 1 tablet (200 mg total) by mouth 3 (three) times daily.    lisinopriL (PRINIVIL,ZESTRIL) 40 MG tablet Take 1 tablet (40 mg total) by mouth once daily.    pep injection Inject 0.3 ml as directed     For compounding pharmacy use:   Add PAPAVERINE 30 " mcg  Add PHENTOLAMINE 10 mg  Add ALPROSTADIL 100 mcg            While in the hospital, will manage blood pressure as follows; Continue home antihypertensive regimen    Will utilize p.r.n. blood pressure medication only if patient's blood pressure greater than 160/100 and he develops symptoms such as worsening chest pain or shortness of breath.      VTE Risk Mitigation (From admission, onward)           Ordered     warfarin split tablet 11.25 mg  Every Tues, Thurs, Sat, Sun         02/22/24 0749     warfarin tablet 7.5 mg  Daily         02/22/24 0749     warfarin tablet 7.5 mg  Once per day on Mon Wed Fri 02/20/24 1015     enoxaparin injection 100 mg  Every 24 hours         02/20/24 1155     Reason for No Pharmacological VTE Prophylaxis  Once        Comments: Pending CoAg levels for heparin bridge   Question:  Reasons:  Answer:  Physician Provided (leave comment)    02/16/24 2116     Place FRANDY hose  Until discontinued         02/16/24 2018     IP VTE HIGH RISK PATIENT  Once         02/16/24 2018     Place sequential compression device  Until discontinued         02/16/24 2018                    Discharge Planning   SARBJIT: 2/22/2024     Code Status: Full Code   Is the patient medically ready for discharge?:     Reason for patient still in hospital (select all that apply): discharge today per urology   Discharge Plan A: Home                  Yordan June MD  Department of Hospital Medicine   [facility] [facility] ([facility])

## 2024-02-22 NOTE — PROGRESS NOTES
Pharmacy Consult Note: Warfarin     Isidro White JrKatya 's Coumadin will be dosed and monitored by Pharmacy.      Target INR goal is 2-3.    INR   Date Value Ref Range Status   02/22/2024 1.9 (H) 0.8 - 1.2 Final     Comment:     Coumadin Therapy:  2.0 - 3.0 for INR for all indicators except mechanical heart valves  and antiphospholipid syndromes which should use 2.5 - 3.5.         Indication: afib with MAVR  Home dose: 7.5 mg MWF; 11.25 mg all other days  Patient will be initiated on a reduced dose of 7.5 mg per day.    Dose for Today: 7.5 mg     PT/INR will be monitored daily. Dose adjustments will be made accordingly.      Thank you for allowing us to participate in this patient's care.     Mindy Obrien, PharmD 2/22/2024 7:57 AM

## 2024-02-23 DIAGNOSIS — B18.1 CHRONIC VIRAL HEPATITIS B WITHOUT DELTA AGENT AND WITHOUT COMA: ICD-10-CM

## 2024-02-23 NOTE — PROGRESS NOTES
Hosp d/c 2/22/24 - no procedure performed.  Pt developed hematuria on Heparin, procedure canceled.  Pt was discharged on home warfarin dose.  Coumadin calendar updated per inpatient MAR.  Pt contacted, he reports that he has resumed his home warfarin dose.  Repeat INR next week with appt at the [facility].

## 2024-02-25 ENCOUNTER — DOCUMENTATION ONLY (OUTPATIENT)
Dept: NEPHROLOGY | Facility: CLINIC | Age: 54
End: 2024-02-25

## 2024-02-25 ENCOUNTER — DOCUMENTATION ONLY (OUTPATIENT)
Dept: NEPHROLOGY | Facility: CLINIC | Age: 54
End: 2024-02-25
Payer: MEDICARE

## 2024-02-25 NOTE — PROGRESS NOTES
History & Physical      Chief Complaint:  H&P    HPI:        Patient is an  male with ESRD on HD MWF at the [facility].         ROS:        Constitutional: Negative for fever, chills, weight loss, malaise/fatigue and diaphoresis.   HENT: Negative for hearing loss, ear pain, nosebleeds, congestion, sore throat, neck pain, tinnitus and ear discharge.    Eyes: Negative for blurred vision, double vision, photophobia, pain, discharge and redness.   Respiratory: Negative for cough, hemoptysis, sputum production, shortness of breath, wheezing and stridor.    Cardiovascular: Negative for chest pain, palpitations, orthopnea, claudication, leg swelling and PND.   Gastrointestinal: Negative for heartburn, nausea, vomiting, abdominal pain, diarrhea, constipation, blood in stool and melena.   Genitourinary: Negative for dysuria, urgency, frequency, hematuria and flank pain.   Musculoskeletal: Negative for myalgias, back pain, joint pain and falls.   Skin: Negative for itching and rash.   Neurological: Negative for dizziness, tingling, tremors, sensory change, speech change, focal weakness, seizures, loss of consciousness, weakness and headaches.   Endo/Heme/Allergies: Negative for environmental allergies and polydipsia. Does not bruise/bleed easily.   Psychiatric/Behavioral: Negative for depression, suicidal ideas, hallucinations, memory loss and substance abuse. The patient is not nervous/anxious and does not have insomnia.    All 14 systems reviewed and negative except as noted above.            Past Medical History:   Diagnosis Date    Anemia     Aortic valve stenosis     s/p mechanical AVR    Atrial fibrillation     Atrial flutter     Cardiomyopathy     CHF (congestive heart failure)     Drug-induced erectile dysfunction     ESRD due to hypertension     HD M, W, F    ESRD on dialysis     GERD (gastroesophageal reflux disease)     Hepatitis B     Hyperlipidemia      Hypertension     Nightmares     Obesity     DANIEL (obstructive sleep apnea) 11/12/2019    Secondary hyperparathyroidism of renal origin     Supraventricular tachycardia     atrial tachycardia    Tachycardia     Valvular regurgitation     AI, TR       Past Surgical History:   Procedure Laterality Date    ASD REPAIR      age 8 [facility]    AV Graft Creation Left 03/2017    CARDIAC CATHETERIZATION      [facility]     CARDIAC VALVE SURGERY  02/08/2017    22 mm Medtronic AV, 28 mm TV Medtronic annuloplaty ring    COLONOSCOPY N/A 8/27/2019    Procedure: COLONOSCOPY;  Surgeon: Addie John MD;  Location: [facility] ENDO;  Service: Endoscopy;  Laterality: N/A;  dialysis pt *needs K*    COLONOSCOPY N/A 9/3/2020    Procedure: COLONOSCOPY-need INR;  Surgeon: Jemma Youngblood MD;  Location: [facility] ENDO;  Service: Endoscopy;  Laterality: N/A;    ESOPHAGOGASTRODUODENOSCOPY N/A 8/27/2019    Procedure: EGD (ESOPHAGOGASTRODUODENOSCOPY);  Surgeon: Addie John MD;  Location: [facility] ENDO;  Service: Endoscopy;  Laterality: N/A;    RADIOFREQUENCY ABLATION  03/13/2017    Atrial tachycardia    REMOVAL OF GRAFT Left 7/2/2020    Procedure: REMOVAL, GRAFT;  Surgeon: Sascha Sidhu MD;  Location: [facility] OR;  Service: Peripheral Vascular;  Laterality: Left;    RIGHT HEART CATHETERIZATION Right 8/12/2021    Procedure: INSERTION, CATHETER, RIGHT HEART;  Surgeon: Mariah Pierce MD;  Location: [facility] CATH LAB;  Service: Cardiology;  Laterality: Right;       Family History   Problem Relation Age of Onset    Leukemia Mother     Cancer Mother     Heart attack Father         massive MI at age 67    No Known Problems Sister     No Known Problems Brother     No Known Problems Sister     No Known Problems Sister     Heart failure Paternal Grandmother     Diabetes Paternal Aunt     Hypertension Paternal Aunt     Leukemia Paternal Aunt         CLL    Suicide Paternal Uncle     Anesthesia problems Paternal Uncle     Diabetes Paternal Aunt     Stroke Paternal  Aunt     Valvular heart disease Maternal Aunt     Kidney disease Neg Hx     Colon cancer Neg Hx        Social History     Socioeconomic History    Marital status: Single   Tobacco Use    Smoking status: Never    Smokeless tobacco: Never   Substance and Sexual Activity    Alcohol use: No     Comment: quit in 2016; does have heavy drinking history; started drinking at age 13; was drinking a 12 pack over the weekend and two 24 ounces of beer a day during the week along with a pint of hard alcohol    Drug use: No    Sexual activity: Not Currently   Social History Narrative    Caregiver Nurse Marjan Swain; 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     Social Determinants of Health     Financial Resource Strain: Low Risk  (12/12/2023)    Overall Financial Resource Strain (CARDIA)     Difficulty of Paying Living Expenses: Not very hard   Food Insecurity: No Food Insecurity (12/12/2023)    Hunger Vital Sign     Worried About Running Out of Food in the Last Year: Never true     Ran Out of Food in the Last Year: Never true   Transportation Needs: No Transportation Needs (12/12/2023)    PRAPARE - Transportation     Lack of Transportation (Medical): No     Lack of Transportation (Non-Medical): No   Physical Activity: Inactive (12/12/2023)    Exercise Vital Sign     Days of Exercise per Week: 0 days     Minutes of Exercise per Session: 0 min   Stress: No Stress Concern Present (12/12/2023)    [employer] - Occupational Stress Questionnaire     Feeling of Stress : Not at all   Social Connections: Moderately Integrated (12/12/2023)    Social Connection and Isolation Panel [NHANES]     Frequency of Communication with Friends and Family: More than three times a week     Frequency of Social Gatherings with Friends and Family: Once a week     Attends Restorationist Services: More than 4 times per year     Active Member of Clubs or Organizations: Yes     Attends Club or Organization Meetings: More than 4 times per  year     Marital Status: Never    Housing Stability: Low Risk  (12/12/2023)    Housing Stability Vital Sign     Unable to Pay for Housing in the Last Year: No     Number of Places Lived in the Last Year: 1     Unstable Housing in the Last Year: No       Current Outpatient Medications   Medication Sig Dispense Refill    albuterol (VENTOLIN HFA) 90 mcg/actuation inhaler Inhale 2 puffs into the lungs every 6 (six) hours as needed for Wheezing or Shortness of Breath. Rescue (Patient not taking: Reported on 1/25/2024) 18 g 3    amLODIPine (NORVASC) 10 MG tablet Take 1 tablet by mouth daily 90 tablet 3    ascorbic acid, vitamin C, (VITAMIN C) 500 MG tablet Take 1 tablet (500 mg total) by mouth 2 (two) times daily.      b complex vitamins tablet Take 1 tablet by mouth once daily.      B complex-vitamin C-folic acid (ETHAN-RACQUEL) 0.8 mg Tab Take 1 tablet by mouth once daily 30 tablet 6    calcium acetate,phosphat bind, (PHOSLO) 667 mg tablet Take 3 capsules by mouth three times daily with meals AND take 1 capsule by mouth twice daily with snacks 330 tablet 12    COVID [id number],12up,,andu,,PF, (SPIKEVAX 9961-0882,12Y UP,,PF,) 50 mcg/0.5 mL injection Inject into the muscle. 0.5 mL 0    dialysis solutions (PERITONEAL DIALYSIS SOLUTION IP) Inject into the peritoneum every Mon, Wed, Fri. Lt FA Fistula, [facility] on M, W, F.      diltiaZEM (TIADYLT ER) 420 MG Cs24 Take 1 capsule (420 mg total) by mouth once daily. 90 capsule 1    epoetin vikki (PROCRIT) 10,000 unit/mL injection Inject 1 mL (10,000 Units total) into the skin every Mon, Wed, Fri. To be given with HD      ferrous sulfate 324 mg (65 mg iron) TbEC Take 325 mg by mouth once daily.      fish oil-omega-3 fatty acids 300-1,000 mg capsule Take 2 g by mouth once daily.      hydrALAZINE (APRESOLINE) 100 MG tablet Take 1 tablet by mouth three times a day including dialysis days 270 tablet 3    hydrOXYzine HCL (ATARAX) 25 MG tablet Take 1 tablet by mouth  daily as needed for itching 30 tablet 6    hydrOXYzine HCL (ATARAX) 25 MG tablet take one tablet by mouth daily as needed for itching 90 tablet 3    labetaloL (NORMODYNE) 200 MG tablet Take 1 tablet (200 mg total) by mouth 3 (three) times daily. 90 tablet 6    lisinopriL (PRINIVIL,ZESTRIL) 40 MG tablet Take 1 tablet (40 mg total) by mouth once daily. 90 tablet 3    multivitamin with minerals tablet Take 1 tablet by mouth once daily.      omega-3 fatty acids-vitamin E (FISH OIL) 1,000 mg Cap Take 1 capsule by mouth once daily.  0    pantoprazole (PROTONIX) 40 MG tablet Take 1 tablet by mouth once daily 90 tablet 3    pep injection Inject 0.3 ml as directed     For compounding pharmacy use:   Add PAPAVERINE 30 mcg  Add PHENTOLAMINE 10 mg  Add ALPROSTADIL 100 mcg 10 vial 10    sildenafiL (VIAGRA) 100 MG tablet Take 1 tablet (100 mg total) by mouth daily as needed for Erectile Dysfunction. 10 tablet 1    tenofovir disoproxil fumarate (VIREAD) 300 mg Tab TAKE 1 TABLET BY MOUTH ONCE WEEKLY 4 tablet 2    vitamin D (VITAMIN D3) 1000 units Tab Take 1 tablet by mouth daily 30 tablet 6    warfarin (COUMADIN) 7.5 MG tablet Take 2 tablets by mouth on Tuesday, Thursdays, and Saturday; and 1 tablet on all other days of the week or as directed by coumadin clinic. (Patient taking differently: Take 1 tablet (7.5 mg) by mouth on Mondays, Wednesdays and Fridays; and 1.5 tablets (11.25) on all other days of the week or as directed by coumadin clinic.) 45 tablet 6     No current facility-administered medications for this visit.     Facility-Administered Medications Ordered in Other Visits   Medication Dose Route Frequency Provider Last Rate Last Admin    0.9%  NaCl infusion   Intravenous Continuous Christopher Jane MD        sodium chloride 0.9% flush 10 mL  10 mL Intravenous PRN Christopher Jane MD           Review of patient's allergies indicates:  No Known Allergies      There were no vitals filed for this visit.          Physical Exam    Nursing Notes and Vital Signs Reviewed.     Constitutional: Well developed, well nourished. AAOx3, NAD, speech/ comprehension clear   Head: Atraumatic. Normocephalic.   Eyes: PERRL. EOMI. Conjunctivae are not pale. No scleral icterus.   ENT: Mucous membranes are dry. No tongue tremors. Throat clear.  Neck: Supple. No JVD or LN or Carotid Bruits noted B.  Cardiovascular: S1S2 RRR, no murmurs, rubs, or gallops. Distal pulses are 2+ and symmetric.   Pulmonary/Chest: No evidence of respiratory distress. Clear to auscultation bilaterally. No wheezing, rales or rhonchi. No chest wall TTP.   Abdominal: Soft and non-distended. There is no tenderness. No rebound, guarding, or rigidity. No organomegaly. No mass or viscera palpable  Musculoskeletal: FROM in all extremities. No deformities, no TTP, no edema. No midline spinal TTP. No step-offs. Pelvis is stable to compression. No cyanosis. Moves all four extremities.   Skin: Skin is warm and dry.   Neurological: No gross neurological deficits, Strength 5/5 B, is equal in the upper and lower extremities bilaterally. No sensory deficits to light touch. No pronator drift.  DTRs are 2+ and equal throughout.   Psychiatric: Good eye contact. Normal Affect.      Laboratory Data:  Reviewed and noted in plan where applicable- Please see chart for full laboratory data.       Lab Results   Component Value Date    WBC 7.09 02/22/2024    HGB 11.8 (L) 02/22/2024    HCT 36.5 (L) 02/22/2024     (H) 02/22/2024     (L) 02/22/2024     BMP  Lab Results   Component Value Date     02/21/2024     02/21/2024    K 4.6 02/21/2024    K 4.6 02/21/2024    CL 97 02/21/2024    CL 96 02/21/2024    CO2 25 02/21/2024    CO2 26 02/21/2024    BUN 72 (H) 02/21/2024    BUN 73 (H) 02/21/2024    CREATININE 16.7 (H) 02/21/2024    CREATININE 16.7 (H) 02/21/2024    CALCIUM 9.9 02/21/2024    CALCIUM 9.8 02/21/2024    ANIONGAP 16 02/21/2024    ANIONGAP 16 02/21/2024    ESTGFRAFRICA 4.9 (A)  07/21/2022    EGFRNONAA 4.3 (A) 07/21/2022     CMP  Sodium   Date Value Ref Range Status   02/21/2024 138 136 - 145 mmol/L Final   02/21/2024 138 136 - 145 mmol/L Final     Potassium   Date Value Ref Range Status   02/21/2024 4.6 3.5 - 5.1 mmol/L Final   02/21/2024 4.6 3.5 - 5.1 mmol/L Final     Chloride   Date Value Ref Range Status   02/21/2024 97 95 - 110 mmol/L Final   02/21/2024 96 95 - 110 mmol/L Final     CO2   Date Value Ref Range Status   02/21/2024 25 23 - 29 mmol/L Final   02/21/2024 26 23 - 29 mmol/L Final     Glucose   Date Value Ref Range Status   02/21/2024 97 70 - 110 mg/dL Final   02/21/2024 98 70 - 110 mg/dL Final     BUN   Date Value Ref Range Status   02/21/2024 72 (H) 6 - 20 mg/dL Final   02/21/2024 73 (H) 6 - 20 mg/dL Final     Creatinine   Date Value Ref Range Status   02/21/2024 16.7 (H) 0.5 - 1.4 mg/dL Final   02/21/2024 16.7 (H) 0.5 - 1.4 mg/dL Final     Calcium   Date Value Ref Range Status   02/21/2024 9.9 8.7 - 10.5 mg/dL Final   02/21/2024 9.8 8.7 - 10.5 mg/dL Final     Total Protein   Date Value Ref Range Status   10/10/2023 8.1 6.0 - 8.4 g/dL Final     Albumin   Date Value Ref Range Status   02/21/2024 2.8 (L) 3.5 - 5.2 g/dL Final     Total Bilirubin   Date Value Ref Range Status   10/10/2023 0.5 0.1 - 1.0 mg/dL Final     Comment:     For infants and newborns, interpretation of results should be based  on gestational age, weight and in agreement with clinical  observations.    Premature Infant recommended reference ranges:  Up to 24 hours.............<8.0 mg/dL  Up to 48 hours............<12.0 mg/dL  3-5 days..................<15.0 mg/dL  6-29 days.................<15.0 mg/dL       Alkaline Phosphatase   Date Value Ref Range Status   10/10/2023 72 55 - 135 U/L Final     AST   Date Value Ref Range Status   10/10/2023 10 10 - 40 U/L Final     ALT   Date Value Ref Range Status   10/10/2023 14 10 - 44 U/L Final     Anion Gap   Date Value Ref Range Status   02/21/2024 16 8 - 16 mmol/L  Final   02/21/2024 16 8 - 16 mmol/L Final     eGFR if    Date Value Ref Range Status   07/21/2022 4.9 (A) >60 mL/min/1.73 m^2 Final     eGFR if non    Date Value Ref Range Status   07/21/2022 4.3 (A) >60 mL/min/1.73 m^2 Final     Comment:     Calculation used to obtain the estimated glomerular filtration  rate (eGFR) is the CKD-EPI equation.        Lab Results   Component Value Date    CALCIUM 9.9 02/21/2024    CALCIUM 9.8 02/21/2024    PHOS 4.7 (H) 02/21/2024     Lab Results   Component Value Date    K 4.6 02/21/2024    K 4.6 02/21/2024     Lab Results   Component Value Date    LABPROT 20.3 (H) 02/22/2024    ALBUMIN 2.8 (L) 02/21/2024     Lab Results   Component Value Date    HGBA1C 4.8 09/20/2019       BMP  @[id number](GLU,NA,K,Cl,CO2,BUN,Creatinine,Calcium,MG)@      Radiology:  Reviewed and noted in plan where applicable- Please see chart for full radiology data.            ASSESSMENT/PLAN:     Patient Active Problem List   Diagnosis    Essential hypertension    Hypertensive cardiomegaly with heart failure    Pulmonary HTN    Adult congenital heart disease    History of mechanical aortic valve replacement    Postprocedural hypotension    ESRD (end stage renal disease) on dialysis    Severe protein-calorie malnutrition    Chronic diastolic heart failure    Atrial tachycardia    History of radiofrequency ablation for complex right atrial arrhythmia    Erectile dysfunction    Chronic pulmonary heart disease    Secondary hyperparathyroidism of renal origin    Atrial fibrillation    On Coumadin for atrial fibrillation    Encounter for colorectal cancer screening    Colon cancer screening    Special screening for malignant neoplasms, colon    Abnormal diffusion capacity determined by pulmonary function test    Mixed restrictive and obstructive lung disease    DANIEL (obstructive sleep apnea)    Nocturnal hypoxemia due to obstructive chronic bronchitis    Pulmonary nodule, left     Psychophysiological insomnia    Primary snoring    Periodic limb movement disorder (PLMD)    Inadequate sleep hygiene    Arteriovenous graft infection    NSVT (nonsustained ventricular tachycardia)    Bleeding from dialysis shunt    Disorder of electrolytes    Anemia associated with chronic renal failure    Hepatic cirrhosis    Gross hematuria    Renal cyst    Aortic atherosclerosis    Chronic hepatitis B    Low vitamin D level    Hypertriglyceridemia    Chronic foot pain    History of colon polyps    Thrombocytopenia    Preoperative clearance    GERD (gastroesophageal reflux disease)         PLAN:      Assessment and plan:    1.  ESRD: Doing well on dialysis. We will continue hemodialysis treatments three times a week, maintaining a URR of 70% or greater and a Kt/V of 1.20. Currently, the patient is stable.    2.  Anemia: We will check hemoglobin at least monthly, target range 10 to 11, transferrin saturation monthly, and ferritin quarterly. We will dose Epogen and iron according to monthly blood work and according to protocol.    3. Hypertension: Currently controlled with current medications, sodium and fluid restrictions and dialysis prescription.     4.  Hyperparathyroidism secondary to renal origin.  We will check intact PTH on a quarterly basis.  We will dose vitamin D according to blood work and protocol.      Tara Yoon, DNP

## 2024-02-26 RX ORDER — TENOFOVIR DISOPROXIL FUMARATE 300 MG/1
300 TABLET, FILM COATED ORAL WEEKLY
Qty: 12 TABLET | Refills: 0 | Status: ACTIVE | OUTPATIENT
Start: 2024-02-26

## 2024-02-29 ENCOUNTER — ANTI-COAG VISIT (OUTPATIENT)
Dept: CARDIOLOGY | Facility: CLINIC | Age: 54
End: 2024-02-29
Payer: MEDICARE

## 2024-02-29 DIAGNOSIS — I48.91 ON COUMADIN FOR ATRIAL FIBRILLATION: ICD-10-CM

## 2024-02-29 DIAGNOSIS — Z79.01 ON COUMADIN FOR ATRIAL FIBRILLATION: ICD-10-CM

## 2024-02-29 DIAGNOSIS — Z95.2 HISTORY OF MECHANICAL AORTIC VALVE REPLACEMENT: ICD-10-CM

## 2024-02-29 DIAGNOSIS — Z79.01 LONG TERM (CURRENT) USE OF ANTICOAGULANTS: Primary | ICD-10-CM

## 2024-02-29 LAB — INR PPP: 1.5 (ref 2–3)

## 2024-02-29 PROCEDURE — 93793 ANTICOAG MGMT PT WARFARIN: CPT | Mod: ,,,

## 2024-02-29 PROCEDURE — 85610 PROTHROMBIN TIME: CPT | Mod: PBBFAC

## 2024-02-29 PROCEDURE — 99999PBSHW POCT INR: Mod: PBBFAC,,,

## 2024-02-29 NOTE — PROGRESS NOTES
Patient's INR is sub-therapeutic at 1.5. Patient reports he missed a dose on 2/28/24. Patient reports no other changes.  Advised of increased risk of clotting; signs/symptoms of clotting and need to go to ED if he experiences any. Patient reports he is not having any signs/symptoms of clotting. Sent to PharmD for dosing/instructions.

## 2024-02-29 NOTE — PROGRESS NOTES
INR not at goal. Medications, chart, and patient findings reviewed. See calendar for adjustments to dose and follow up plan.  Has not been therapeutic since hospital stay.  We will boost warfarin dose x 2 since he missed a dose and had not returned to range before leaving the hospital.  Repeat INR early next week.

## 2024-03-06 ENCOUNTER — ANTI-COAG VISIT (OUTPATIENT)
Dept: CARDIOLOGY | Facility: CLINIC | Age: 54
End: 2024-03-06
Payer: MEDICARE

## 2024-03-06 DIAGNOSIS — Z79.01 ON COUMADIN FOR ATRIAL FIBRILLATION: ICD-10-CM

## 2024-03-06 DIAGNOSIS — Z79.01 LONG TERM (CURRENT) USE OF ANTICOAGULANTS: Primary | ICD-10-CM

## 2024-03-06 DIAGNOSIS — I48.91 ON COUMADIN FOR ATRIAL FIBRILLATION: ICD-10-CM

## 2024-03-06 DIAGNOSIS — Z95.2 HISTORY OF MECHANICAL AORTIC VALVE REPLACEMENT: ICD-10-CM

## 2024-03-06 LAB — INR PPP: 1.5 (ref 2–3)

## 2024-03-06 PROCEDURE — 93793 ANTICOAG MGMT PT WARFARIN: CPT | Mod: ,,,

## 2024-03-06 PROCEDURE — 85610 PROTHROMBIN TIME: CPT | Mod: PBBFAC

## 2024-03-06 PROCEDURE — 99999PBSHW POCT INR: Mod: PBBFAC,,,

## 2024-03-06 NOTE — PROGRESS NOTES
Patient's INR is sub-therapeutic at 1.5. Patient reports he missed a dose. Patient reports he is still seeing blood in his urine. Advised patent of increased risk of cotting; signs/symptoms of clotting and need to go to ED if he experiences any. Patient report he is not having any signs/symptoms of clotting. Sent to PharmD for dosing/instructions.

## 2024-03-06 NOTE — PROGRESS NOTES
INR not at goal. Medications, chart, and patient findings reviewed. See calendar for adjustments to dose and follow up plan.  Boost aggressively today as INR has not been in range since d/c as he continues to miss doses of warfarin.  He is still reporting hematuria - needs to contact urology now and get a sooner appointment if possible.  Requested he go to lab for INR on Tuesday, pt declined lab and will come to [facility] on Thursday next week.

## 2024-03-12 ENCOUNTER — OFFICE VISIT (OUTPATIENT)
Dept: INTERNAL MEDICINE | Facility: CLINIC | Age: 54
End: 2024-03-12
Payer: MEDICARE

## 2024-03-12 ENCOUNTER — OFFICE VISIT (OUTPATIENT)
Dept: UROLOGY | Facility: CLINIC | Age: 54
End: 2024-03-12
Payer: MEDICARE

## 2024-03-12 ENCOUNTER — LAB VISIT (OUTPATIENT)
Dept: LAB | Facility: HOSPITAL | Age: 54
End: 2024-03-12
Attending: PEDIATRICS
Payer: MEDICARE

## 2024-03-12 VITALS
DIASTOLIC BLOOD PRESSURE: 77 MMHG | HEART RATE: 72 BPM | BODY MASS INDEX: 27.97 KG/M2 | WEIGHT: 217.81 LBS | SYSTOLIC BLOOD PRESSURE: 123 MMHG

## 2024-03-12 VITALS
OXYGEN SATURATION: 97 % | RESPIRATION RATE: 16 BRPM | TEMPERATURE: 96 F | DIASTOLIC BLOOD PRESSURE: 68 MMHG | SYSTOLIC BLOOD PRESSURE: 136 MMHG | HEART RATE: 81 BPM | WEIGHT: 217.81 LBS | BODY MASS INDEX: 27.95 KG/M2 | HEIGHT: 74 IN

## 2024-03-12 DIAGNOSIS — N18.6 ESRD (END STAGE RENAL DISEASE) ON DIALYSIS: Chronic | ICD-10-CM

## 2024-03-12 DIAGNOSIS — N52.9 ERECTILE DYSFUNCTION, UNSPECIFIED ERECTILE DYSFUNCTION TYPE: Primary | ICD-10-CM

## 2024-03-12 DIAGNOSIS — R73.09 ELEVATED GLUCOSE: ICD-10-CM

## 2024-03-12 DIAGNOSIS — Z95.2 HISTORY OF MECHANICAL AORTIC VALVE REPLACEMENT: ICD-10-CM

## 2024-03-12 DIAGNOSIS — I50.32 CHRONIC DIASTOLIC HEART FAILURE: ICD-10-CM

## 2024-03-12 DIAGNOSIS — I48.91 ON COUMADIN FOR ATRIAL FIBRILLATION: Primary | ICD-10-CM

## 2024-03-12 DIAGNOSIS — N31.9 NEUROMUSCULAR DYSFUNCTION OF BLADDER, UNSPECIFIED: ICD-10-CM

## 2024-03-12 DIAGNOSIS — I10 ESSENTIAL HYPERTENSION: ICD-10-CM

## 2024-03-12 DIAGNOSIS — E78.2 MODERATE MIXED HYPERLIPIDEMIA NOT REQUIRING STATIN THERAPY: ICD-10-CM

## 2024-03-12 DIAGNOSIS — Z99.2 ESRD (END STAGE RENAL DISEASE) ON DIALYSIS: Chronic | ICD-10-CM

## 2024-03-12 DIAGNOSIS — I48.91 ATRIAL FIBRILLATION, UNSPECIFIED TYPE: ICD-10-CM

## 2024-03-12 DIAGNOSIS — R31.0 GROSS HEMATURIA: ICD-10-CM

## 2024-03-12 DIAGNOSIS — N52.9 ERECTILE DYSFUNCTION, UNSPECIFIED ERECTILE DYSFUNCTION TYPE: ICD-10-CM

## 2024-03-12 DIAGNOSIS — Z79.01 ON COUMADIN FOR ATRIAL FIBRILLATION: Primary | ICD-10-CM

## 2024-03-12 PROBLEM — M79.673 CHRONIC FOOT PAIN: Status: RESOLVED | Noted: 2023-12-12 | Resolved: 2024-03-12

## 2024-03-12 PROBLEM — Z01.818 PREOPERATIVE CLEARANCE: Status: RESOLVED | Noted: 2024-02-16 | Resolved: 2024-03-12

## 2024-03-12 PROBLEM — G89.29 CHRONIC FOOT PAIN: Status: RESOLVED | Noted: 2023-12-12 | Resolved: 2024-03-12

## 2024-03-12 LAB
CHOLEST SERPL-MCNC: 248 MG/DL (ref 120–199)
CHOLEST/HDLC SERPL: 8.6 {RATIO} (ref 2–5)
ESTIMATED AVG GLUCOSE: 94 MG/DL (ref 68–131)
HBA1C MFR BLD: 4.9 % (ref 4–5.6)
HDLC SERPL-MCNC: 29 MG/DL (ref 40–75)
HDLC SERPL: 11.7 % (ref 20–50)
LDLC SERPL CALC-MCNC: 170 MG/DL (ref 63–159)
NONHDLC SERPL-MCNC: 219 MG/DL
TRIGL SERPL-MCNC: 245 MG/DL (ref 30–150)

## 2024-03-12 PROCEDURE — 99213 OFFICE O/P EST LOW 20 MIN: CPT | Mod: S$PBB,,, | Performed by: PEDIATRICS

## 2024-03-12 PROCEDURE — 99215 OFFICE O/P EST HI 40 MIN: CPT | Mod: PBBFAC | Performed by: PEDIATRICS

## 2024-03-12 PROCEDURE — 99999 PR PBB SHADOW E&M-EST. PATIENT-LVL V: CPT | Mod: PBBFAC,,, | Performed by: PEDIATRICS

## 2024-03-12 PROCEDURE — 99999 PR PBB SHADOW E&M-EST. PATIENT-LVL IV: CPT | Mod: PBBFAC,,, | Performed by: NURSE PRACTITIONER

## 2024-03-12 PROCEDURE — 80061 LIPID PANEL: CPT | Performed by: PEDIATRICS

## 2024-03-12 PROCEDURE — 36415 COLL VENOUS BLD VENIPUNCTURE: CPT | Performed by: PEDIATRICS

## 2024-03-12 PROCEDURE — 83036 HEMOGLOBIN GLYCOSYLATED A1C: CPT | Performed by: PEDIATRICS

## 2024-03-12 PROCEDURE — 99214 OFFICE O/P EST MOD 30 MIN: CPT | Mod: S$PBB,,, | Performed by: NURSE PRACTITIONER

## 2024-03-12 PROCEDURE — 99214 OFFICE O/P EST MOD 30 MIN: CPT | Mod: PBBFAC,27 | Performed by: NURSE PRACTITIONER

## 2024-03-12 NOTE — PROGRESS NOTES
Subjective     Patient ID: Isidro White Jr. is a 54 y.o. male.    Chief Complaint: Hospital Follow Up    Isidro White is a 54 y.o. male who presents today for a hospital follow up visit. Pt was admitted to [facility] on 2/16 for Penile implant and heparin bridge. Surgery did not precede due to heparin bridge. He is now back on coumadin. He still reports trace hematuria. Has urology follow up today.    1)A-fib, Chronic diastolic heart failure: Status post AVR replacement. On coumadin. Currently no CHF systems followed by Cards.   2)Erectile dysfunction:  3)ESRD: On dialysis  4)HTN: Stable on lisinopril, amlodipine, and hydralazine.  5) Dyslipidemia: episodic based on diet.  PMHx, PSHx, SocHx, and FHx reviewed and discussed with patient.        Review of Systems   Constitutional:  Negative for chills and fever.   HENT:  Negative for nasal congestion and rhinorrhea.    Respiratory:  Negative for cough and shortness of breath.    Cardiovascular:  Negative for chest pain.   Gastrointestinal:  Negative for abdominal pain, blood in stool, change in bowel habit, constipation, diarrhea and nausea.   Endocrine: Negative for cold intolerance, heat intolerance, polydipsia, polyphagia and polyuria.   Genitourinary:  Positive for erectile dysfunction and hematuria. Negative for dysuria.   Neurological:  Negative for weakness.          Objective     Physical Exam  Constitutional:       General: He is not in acute distress.     Appearance: Normal appearance. He is normal weight. He is not ill-appearing, toxic-appearing or diaphoretic.   HENT:      Head: Atraumatic.   Cardiovascular:      Rate and Rhythm: Normal rate. Rhythm irregular.      Pulses: Normal pulses.      Heart sounds: Normal heart sounds. No murmur heard.     Comments: Irregularly irregular rhythm but rate controlled.   Pulmonary:      Effort: Pulmonary effort is normal. No respiratory distress.      Breath sounds: Normal breath sounds. No stridor. No  wheezing, rhonchi or rales.   Chest:      Chest wall: No tenderness.   Abdominal:      General: Abdomen is flat. Bowel sounds are normal. There is no distension.      Palpations: Abdomen is soft. There is no mass.      Tenderness: There is no abdominal tenderness. There is no guarding.   Neurological:      General: No focal deficit present.      Mental Status: He is alert and oriented to person, place, and time. Mental status is at baseline.      Cranial Nerves: No cranial nerve deficit.      Sensory: No sensory deficit.      Motor: No weakness.      Gait: Gait normal.   Psychiatric:         Mood and Affect: Mood normal.         Behavior: Behavior normal.            Assessment and Plan     1. On Coumadin for atrial fibrillation    2. Erectile dysfunction, unspecified erectile dysfunction type    3. Chronic diastolic heart failure  Overview:  Non ischemic DCM      4. ESRD (end stage renal disease) on dialysis    5. History of mechanical aortic valve replacement    6. Essential hypertension    7. Atrial fibrillation, unspecified type    8. Moderate mixed hyperlipidemia not requiring statin therapy  -     Lipid Panel; Future; Expected date: 03/12/2024          At goal for B/P. Labs ordered, possible statin. He has a follow up today with Dr. Shirley's PA. Keep subspecialty care. Maintain D&E and meds. Follow up 6 months with labs. Vaccines reviewed and discussed.          No follow-ups on file.      Transitional Care Note    Family and/or Caretaker present at visit?  No.  Diagnostic tests reviewed/disposition: I have reviewed all completed as well as pending diagnostic tests at the time of discharge.  Disease/illness education: given  Home health/community services discussion/referrals: Patient does not have home health established from hospital visit.  They do not need home health.  If needed, we will set up home health for the patient.   Establishment or re-establishment of referral orders for community resources: No  other necessary community resources.   Discussion with other health care providers:  subspecialty care in place .

## 2024-03-12 NOTE — PROGRESS NOTES
Chief Complaint:   Hospital follow-up    HPI:   Patient was admitted in February for Coumadin bridge for future insertion of inflatable penile prosthesis, unfortunately patient experienced flank pain and gross hematuria.  CT scan demonstrated possible clots within the bladder in the collecting system on the right with no perirenal hematoma. .  Surgery was canceled and patient is returning as a follow-up visit.  [facility]  1/18/24- patient returns today to clarify plans, no changes.       2/28/19: 50 yo man voids about once a day since he is a MWF dialysis pt.  Saw a clot and gross hematuria about 2-3 weeks ago on one occasion.  No abd/pelvic pain and no exac/rel factors.  No urolithiasis.  Weak stream.  No  history.  Normal sexual function until recently.  Had a CT Urogram that shows no abnormalities except renal cystic function consistent with ESRD.      Allergies:  Patient has no known allergies.    Medications:  has a current medication list which includes the following prescription(s): albuterol, amlodipine, ascorbic acid (vitamin c), b complex vitamins, shawna-nataly, calcium acetate(phosphat bind), spikevax 4385-9719(12y up)(pf), dialysis solutions, diltiazem, epoetin vikki, ferrous sulfate, fish oil-omega-3 fatty acids, hydralazine, hydroxyzine hcl, hydroxyzine hcl, labetalol, lisinopril, multivitamin with minerals, omega-3 fatty acids-vitamin e, pantoprazole, alprostadil, sildenafil, tenofovir disoproxil fumarate, tenofovir disoproxil fumarate, vitamin d, and warfarin, and the following Facility-Administered Medications: sodium chloride 0.9% and sodium chloride 0.9%.    Review of Systems:  General: No fever, chills, fatigability, or weight loss.  Skin: No rashes, itching, or changes in color or texture of skin.  Chest: Denies HAY, cyanosis, wheezing, cough, and sputum production.  Abdomen: Appetite fine. No weight loss. Denies diarrhea, abdominal pain, hematemesis, or blood in stool.  Musculoskeletal: No  joint stiffness or swelling. Denies back pain.  : As above.  All other review of systems negative.    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    PSH:   has a past surgical history that includes ASD repair; Cardiac valuve replacement (02/08/2017); Radiofrequency ablation (03/13/2017); Cardiac catheterization; AV Graft Creation (Left, 03/2017); Colonoscopy (N/A, 8/27/2019); Esophagogastroduodenoscopy (N/A, 8/27/2019); Removal of graft (Left, 7/2/2020); Colonoscopy (N/A, 9/3/2020); and Right heart catheterization (Right, 8/12/2021).    FamHx: family history includes Anesthesia problems in his paternal uncle; Cancer in his mother; Diabetes in his paternal aunt and paternal aunt; Heart attack in his father; Heart failure in his paternal grandmother; Hypertension in his paternal aunt; Leukemia in his mother and paternal aunt; No Known Problems in his brother, sister, sister, and sister; Stroke in his paternal aunt; Suicide in his paternal uncle; Valvular heart disease in his maternal aunt.    SocHx:  reports that he has never smoked. He has never used smokeless tobacco. He reports that he does not drink alcohol and does not use drugs.      Physical Exam:  General: A&Ox3, no apparent distress, no deformities  Neck: No masses, normal thyroid  Lungs: normal inspiration, no use of accessory muscles  Heart: normal pulse, no arrhythmias  Abdomen: Soft, NT, ND, no masses, no hernias, no hepatosplenomegaly  Lymphatic: Neck and groin nodes negative  Skin: The skin is warm and  dry. No jaundice.      Labs/Studies:   02/19/2024  CT RENAL STONE STUDY ABD PELVIS WO     CLINICAL HISTORY:  right kidney bleed; Gross hematuria     TECHNIQUE:  Low dose axial images, sagittal and coronal reformations were obtained from the lung bases to the pubic symphysis.  Oral contrast was not administered.     COMPARISON:  07/30/2021, 02/18/2024     FINDINGS:  Heart: Normal size. No effusion.  Pericardial calcifications are seen.  Severe valve calcifications and suspected valve replacements.     Lung Bases: Clear.  Scarring seen at the left lung base.     Liver: Normal size and attenuation. No focal lesions.     Gallbladder: No calcified gallstones.     Bile Ducts: No dilatation.     Pancreas: No obvious mass. No peripancreatic fat stranding.     Spleen: Normal.     Adrenals: Normal.     Kidneys/Ureters: Enlarged kidneys replaced by cysts of varying densities.  Slightly increased hydronephrosis within the right kidney with high density thought to reflect blood products.  Several hyperdense cysts are seen within the kidneys bilaterally. Evaluation limited without contrast.     Bladder: Blood clot seen within the urinary bladder measuring 5.1 x 4.7 cm.     Reproductive organs: Normal.     GI Tract/Mesentery: No evidence of bowel obstruction or inflammation.  Mild constipation.  No evidence of appendicitis     Peritoneal Space: No ascites or free air.     Retroperitoneum: No significant adenopathy.     Abdominal wall: Small fat containing umbilical hernia.  Fat containing right inguinal hernia.     Vasculature: No aneurysm.  Severe atherosclerotic disease.     Bones: No acute fracture.  Remote right-sided rib fracture at 9th posterolateral.  Mild degenerative changes lower lumbar spine no suspicious lytic or sclerotic lesions.     Impression:  Slightly increased right-sided hydronephrosis with high density within the collecting system thought to reflect blood products.  Stable clot within the central bladder.      See additional findings above.  Evaluation of solid organ and vascular pathology is limited due to lack of IV contrast.  CTA could be obtained if there is concern for acute hemorrhage.  Impression/Plan:   Will repeat CT scan renal stone study and patient to return to clinic to discuss possible IPP surgery utilizing Lovenox instead of heparin.

## 2024-03-13 ENCOUNTER — TELEPHONE (OUTPATIENT)
Dept: INTERNAL MEDICINE | Facility: CLINIC | Age: 54
End: 2024-03-13
Payer: MEDICARE

## 2024-03-13 RX ORDER — ROSUVASTATIN CALCIUM 10 MG/1
10 TABLET, COATED ORAL DAILY
Qty: 90 TABLET | Refills: 3 | Status: SHIPPED | OUTPATIENT
Start: 2024-03-13 | End: 2025-03-13

## 2024-03-13 NOTE — TELEPHONE ENCOUNTER
Returned call to pt. Advised him that the new medication, rosuvastatin, is for cholesterol and I advised him of the result note. He verbalized understanding.

## 2024-03-13 NOTE — TELEPHONE ENCOUNTER
----- Message from Dianna Hilton sent at 3/13/2024  3:12 PM CDT -----  Contact: Isidro  Patient is requesting a call back concerning new medication he was prescribed, reports he doesn't know what it is for, please call the pt back at [phone].          Thanks

## 2024-03-14 ENCOUNTER — ANTI-COAG VISIT (OUTPATIENT)
Dept: CARDIOLOGY | Facility: CLINIC | Age: 54
End: 2024-03-14
Payer: MEDICARE

## 2024-03-14 DIAGNOSIS — Z79.01 LONG TERM (CURRENT) USE OF ANTICOAGULANTS: Primary | ICD-10-CM

## 2024-03-14 DIAGNOSIS — Z95.2 HISTORY OF MECHANICAL AORTIC VALVE REPLACEMENT: ICD-10-CM

## 2024-03-14 DIAGNOSIS — I48.91 ON COUMADIN FOR ATRIAL FIBRILLATION: ICD-10-CM

## 2024-03-14 DIAGNOSIS — Z79.01 ON COUMADIN FOR ATRIAL FIBRILLATION: ICD-10-CM

## 2024-03-14 LAB — INR PPP: 2.4 (ref 2–3)

## 2024-03-14 PROCEDURE — 85610 PROTHROMBIN TIME: CPT | Mod: PBBFAC

## 2024-03-14 PROCEDURE — 93793 ANTICOAG MGMT PT WARFARIN: CPT | Mod: ,,,

## 2024-03-14 PROCEDURE — 99999PBSHW POCT INR: Mod: PBBFAC,,,

## 2024-03-14 RX ORDER — WARFARIN 7.5 MG/1
7.5 TABLET ORAL DAILY
Qty: 45 TABLET | Refills: 6 | Status: ON HOLD | OUTPATIENT
Start: 2024-03-14 | End: 2024-05-26

## 2024-03-14 RX ORDER — WARFARIN 7.5 MG/1
7.5 TABLET ORAL
COMMUNITY
End: 2024-03-14 | Stop reason: SDUPTHER

## 2024-03-14 NOTE — PROGRESS NOTES
INR not at goal. Medications, chart, and patient findings reviewed. See calendar for adjustments to dose and follow up plan.  Will boost tomorrow and resume dose, INR next week with appt.     No

## 2024-03-14 NOTE — PROGRESS NOTES
Patient's INR is therapeutic at 2.4. Patient reports he started taking rosuvastatin 10 mg daily on 3/13/24. Patient reports no other changes. Instructions given: Continue warfarin 7.5 mg on Fridays, Mondays and Wednesdays; and 11.25 mg all other days. Recheck on 3/28/24. Calendar reviewed with patient. Patient verbalizes understanding.

## 2024-04-02 ENCOUNTER — HOSPITAL ENCOUNTER (OUTPATIENT)
Dept: RADIOLOGY | Facility: HOSPITAL | Age: 54
Discharge: HOME | End: 2024-04-02
Attending: NURSE PRACTITIONER
Payer: MEDICARE

## 2024-04-02 ENCOUNTER — TELEPHONE (OUTPATIENT)
Dept: HEPATOLOGY | Facility: CLINIC | Age: 54
End: 2024-04-02
Payer: MEDICARE

## 2024-04-02 DIAGNOSIS — N31.9 NEUROMUSCULAR DYSFUNCTION OF BLADDER, UNSPECIFIED: ICD-10-CM

## 2024-04-02 PROCEDURE — 74176 CT ABD & PELVIS W/O CONTRAST: CPT | Mod: 26,,, | Performed by: RADIOLOGY

## 2024-04-02 PROCEDURE — 74176 CT ABD & PELVIS W/O CONTRAST: CPT | Mod: TC

## 2024-04-02 NOTE — TELEPHONE ENCOUNTER
Patient already has a follow up appointment scheduled and will reach out with any further questions or concerns.     ----- Message from Aminah Rodriguez RN sent at 4/2/2024 11:22 AM CDT -----  NoKatya Cruz  ----- Message -----  From: Akua Johnson MA  Sent: 4/2/2024  11:21 AM CDT  To: Aminah Rodriguez RN    Are you the transplant coordinator for this patient?    ----- Message -----  From: Aminah Rodriguez RN  Sent: 4/2/2024  10:32 AM CDT  To: Akua Johnson MA    Not sure why this is coming to me. This is the [facility]    ----- Message -----  From: Akua Johnson MA  Sent: 3/28/2024   3:36 PM CDT  To: Aminah Rodriguez RN    Provider VERENA Sommers in Hepatology is no longer with [facility]. Please reach out to the Hepatology Coordinator to get patient rescheduled if appointment is needed sooner than November at the [facility]. Patient has also been made aware someone from your department would reach out to him in regard to this appointment and voices understanding.

## 2024-04-03 ENCOUNTER — PATIENT MESSAGE (OUTPATIENT)
Dept: PULMONOLOGY | Facility: CLINIC | Age: 54
End: 2024-04-03
Payer: MEDICARE

## 2024-04-04 ENCOUNTER — OFFICE VISIT (OUTPATIENT)
Dept: UROLOGY | Facility: CLINIC | Age: 54
End: 2024-04-04
Payer: MEDICARE

## 2024-04-04 ENCOUNTER — ANTI-COAG VISIT (OUTPATIENT)
Dept: CARDIOLOGY | Facility: CLINIC | Age: 54
End: 2024-04-04
Payer: MEDICARE

## 2024-04-04 VITALS
WEIGHT: 217.81 LBS | HEART RATE: 94 BPM | DIASTOLIC BLOOD PRESSURE: 73 MMHG | SYSTOLIC BLOOD PRESSURE: 111 MMHG | BODY MASS INDEX: 27.97 KG/M2

## 2024-04-04 DIAGNOSIS — I48.91 ON COUMADIN FOR ATRIAL FIBRILLATION: ICD-10-CM

## 2024-04-04 DIAGNOSIS — N52.9 ERECTILE DYSFUNCTION, UNSPECIFIED ERECTILE DYSFUNCTION TYPE: ICD-10-CM

## 2024-04-04 DIAGNOSIS — Z79.01 ON COUMADIN FOR ATRIAL FIBRILLATION: ICD-10-CM

## 2024-04-04 DIAGNOSIS — Z01.818 PRE-OP TESTING: ICD-10-CM

## 2024-04-04 DIAGNOSIS — Z79.01 LONG TERM (CURRENT) USE OF ANTICOAGULANTS: Primary | ICD-10-CM

## 2024-04-04 DIAGNOSIS — Z95.2 HISTORY OF MECHANICAL AORTIC VALVE REPLACEMENT: ICD-10-CM

## 2024-04-04 DIAGNOSIS — R31.0 GROSS HEMATURIA: Primary | ICD-10-CM

## 2024-04-04 LAB — INR PPP: 5.4 (ref 2–3)

## 2024-04-04 PROCEDURE — 99999PBSHW POCT INR: Mod: PBBFAC,,,

## 2024-04-04 PROCEDURE — 99214 OFFICE O/P EST MOD 30 MIN: CPT | Mod: S$PBB,,, | Performed by: UROLOGY

## 2024-04-04 PROCEDURE — 93793 ANTICOAG MGMT PT WARFARIN: CPT | Mod: ,,,

## 2024-04-04 PROCEDURE — 85610 PROTHROMBIN TIME: CPT | Mod: PBBFAC

## 2024-04-04 PROCEDURE — 99213 OFFICE O/P EST LOW 20 MIN: CPT | Mod: PBBFAC | Performed by: UROLOGY

## 2024-04-04 PROCEDURE — 99999 PR PBB SHADOW E&M-EST. PATIENT-LVL III: CPT | Mod: PBBFAC,,, | Performed by: UROLOGY

## 2024-04-04 NOTE — PROGRESS NOTES
INR not at goal. Medications, chart, and patient findings reviewed. See calendar for adjustments to dose and follow up plan.  Pt should not change dose.  Please do not self dose - we can always check INR for him prior to dosing.  Hold dose today , 1/2 dose tomorrow then resume.  Repeat INR on Tuesday.  ER with any bleeding.  Eat a few extra pickles today.

## 2024-04-04 NOTE — PROGRESS NOTES
Patient's INR is supra-therapeutic at 5.4. Patient reports he self-adjusted medication because he felt his warfarin level was low. Re-educated patient on risks of self-adjusting medication. Patient reports he has been experiencing dizziness, nausea, and generalized weakness since yesterday (4/3/24). Patient reports he is not experiencing any bleeding. Sent to PharmD for dosing/instructions.

## 2024-04-05 ENCOUNTER — TELEPHONE (OUTPATIENT)
Dept: UROLOGY | Facility: CLINIC | Age: 54
End: 2024-04-05
Payer: MEDICARE

## 2024-04-05 NOTE — TELEPHONE ENCOUNTER
04/05/2024 1030 - .Outgoing call placed to patient, patient verified name and date of birth, patient notified as requested below by Robyn Rivers NP, patient verbalized understanding and stated he saw Dr. Shirley on yesterday about everything, no further assistance needed.    Thai Rahman RN      ----- Message from Robyn Rivers NP sent at 4/3/2024 10:50 AM CDT -----  Please call patient and inform him that CT RSS results indicated Autosomal dominant polycystic kidney disease.  Resolution of the right hydronephrosis.  Resolution of the urinary bladder clot. He is to keep his appointment with DR. Shirley to discuss proceeding with IPP.

## 2024-04-09 ENCOUNTER — ANTI-COAG VISIT (OUTPATIENT)
Dept: CARDIOLOGY | Facility: CLINIC | Age: 54
End: 2024-04-09
Payer: MEDICARE

## 2024-04-09 ENCOUNTER — OFFICE VISIT (OUTPATIENT)
Dept: CARDIOLOGY | Facility: CLINIC | Age: 54
End: 2024-04-09
Payer: MEDICARE

## 2024-04-09 VITALS
OXYGEN SATURATION: 96 % | DIASTOLIC BLOOD PRESSURE: 68 MMHG | BODY MASS INDEX: 27.64 KG/M2 | WEIGHT: 215.38 LBS | HEIGHT: 74 IN | SYSTOLIC BLOOD PRESSURE: 126 MMHG | HEART RATE: 86 BPM

## 2024-04-09 DIAGNOSIS — Z95.2 H/O MECHANICAL AORTIC VALVE REPLACEMENT: ICD-10-CM

## 2024-04-09 DIAGNOSIS — I70.0 AORTIC ATHEROSCLEROSIS: ICD-10-CM

## 2024-04-09 DIAGNOSIS — I27.20 PULMONARY HYPERTENSION: ICD-10-CM

## 2024-04-09 DIAGNOSIS — Z01.810 PREOP CARDIOVASCULAR EXAM: Primary | ICD-10-CM

## 2024-04-09 DIAGNOSIS — Z95.2 S/P AVR (AORTIC VALVE REPLACEMENT): ICD-10-CM

## 2024-04-09 DIAGNOSIS — I47.10 SVT (SUPRAVENTRICULAR TACHYCARDIA): ICD-10-CM

## 2024-04-09 DIAGNOSIS — N18.6 ESRD (END STAGE RENAL DISEASE) ON DIALYSIS: ICD-10-CM

## 2024-04-09 DIAGNOSIS — Z95.2 HISTORY OF MECHANICAL AORTIC VALVE REPLACEMENT: ICD-10-CM

## 2024-04-09 DIAGNOSIS — I48.0 PAF (PAROXYSMAL ATRIAL FIBRILLATION): ICD-10-CM

## 2024-04-09 DIAGNOSIS — I47.19 ATRIAL TACHYCARDIA: ICD-10-CM

## 2024-04-09 DIAGNOSIS — Z98.890 HISTORY OF RADIOFREQUENCY ABLATION FOR COMPLEX RIGHT ATRIAL ARRHYTHMIA: ICD-10-CM

## 2024-04-09 DIAGNOSIS — Z79.01 ON COUMADIN FOR ATRIAL FIBRILLATION: ICD-10-CM

## 2024-04-09 DIAGNOSIS — Z79.01 LONG TERM (CURRENT) USE OF ANTICOAGULANTS: Primary | ICD-10-CM

## 2024-04-09 DIAGNOSIS — I27.20 PULMONARY HTN: ICD-10-CM

## 2024-04-09 DIAGNOSIS — Z95.2 H/O AORTIC VALVE REPLACEMENT: ICD-10-CM

## 2024-04-09 DIAGNOSIS — Z79.01 ANTICOAGULATED: ICD-10-CM

## 2024-04-09 DIAGNOSIS — I49.3 PVC'S (PREMATURE VENTRICULAR CONTRACTIONS): ICD-10-CM

## 2024-04-09 DIAGNOSIS — I50.32 CHRONIC DIASTOLIC HEART FAILURE: ICD-10-CM

## 2024-04-09 DIAGNOSIS — Z99.2 ESRD (END STAGE RENAL DISEASE) ON DIALYSIS: ICD-10-CM

## 2024-04-09 DIAGNOSIS — I48.91 ON COUMADIN FOR ATRIAL FIBRILLATION: ICD-10-CM

## 2024-04-09 DIAGNOSIS — N18.6 END-STAGE RENAL DISEASE: ICD-10-CM

## 2024-04-09 DIAGNOSIS — I47.29 NSVT (NONSUSTAINED VENTRICULAR TACHYCARDIA): ICD-10-CM

## 2024-04-09 DIAGNOSIS — N52.9 ERECTILE DYSFUNCTION, UNSPECIFIED ERECTILE DYSFUNCTION TYPE: ICD-10-CM

## 2024-04-09 DIAGNOSIS — B18.1 CHRONIC VIRAL HEPATITIS B WITHOUT DELTA AGENT AND WITHOUT COMA: ICD-10-CM

## 2024-04-09 DIAGNOSIS — Z79.01 LONG TERM (CURRENT) USE OF ANTICOAGULANTS: ICD-10-CM

## 2024-04-09 LAB — INR PPP: 2 (ref 2–3)

## 2024-04-09 PROCEDURE — 99214 OFFICE O/P EST MOD 30 MIN: CPT | Mod: S$PBB,,, | Performed by: INTERNAL MEDICINE

## 2024-04-09 PROCEDURE — 85610 PROTHROMBIN TIME: CPT | Mod: PBBFAC

## 2024-04-09 PROCEDURE — G2211 COMPLEX E/M VISIT ADD ON: HCPCS | Mod: S$PBB,,, | Performed by: INTERNAL MEDICINE

## 2024-04-09 PROCEDURE — 99215 OFFICE O/P EST HI 40 MIN: CPT | Mod: PBBFAC | Performed by: INTERNAL MEDICINE

## 2024-04-09 PROCEDURE — 99999 PR PBB SHADOW E&M-EST. PATIENT-LVL V: CPT | Mod: PBBFAC,,, | Performed by: INTERNAL MEDICINE

## 2024-04-09 PROCEDURE — 99999PBSHW POCT INR: Mod: PBBFAC,,,

## 2024-04-09 NOTE — PROGRESS NOTES
Patient's INR is therapeutic at 2.0. Patient reports he followed previous instructions. Patient reports no changes. Instructions given: Continue warfarin 7.5 mg on Wednesdays, Fridays and Mondays; and 11.25 mg all other days. Recheck on 5/2/24 with other appointment. Calendar reviewed with patient. Patient verbalizes understanding.

## 2024-04-09 NOTE — PROGRESS NOTES
Subjective:   Patient ID:  Isidro White Jr. is a 54 y.o. male who presents for cardiac consult of No chief complaint on file.          The patient came in today for cardiac consult of No chief complaint on file.      Isidro White Jr. is a 54 y.o. male pt with PAF, ASD closure at age 8 ([facility]), HFpEF, s/p AVR with a 22 mm mechanical valve and TV annuloplasty with a 28 mm ring 3/17, HTN, PHTN, Mitral stenosis, SVT, EP performed RFA (3/13/17) and has been on HD - MWF since Feb 8,2016 here for CV follow up.     1/25/24  BP and HR stable. BMI 28 - 222 lbs.   Pt saw Dr. De León - recurrence of his old arrhythmia or could be a new source (AFL, R atriotomy, perimitral).  - discussed ablation.   Pt will have  penile prosthesis implant with Dr. Shirley , will be admitted Fri before surgery to [facility] and cardiology consulted  No CP/SOP.   ECG - NSR, LAD, nonsp IVCD, LVH       Hospital Course:   2/18/24- Surgery planned for Tuesday, currently bridging IV heparin , while holding coumadin. Pt without any complaints. BP is stable.   2/19/24: pt has right flank pain today, concerns for bleed on heparin gtt.  Urology ordering CT scan, working pt up.  Surgery  needs to be cancelled.  2/20/24-Patient seen and examined today, resting in bed. Feeling better. Abdominal pain/soreness improved. CT yesterday with blood clot within urinary bladder, slightly increased hydronephrosis within the right kidney with high density thought to reflect blood products. Urology following, no intervention planned, will monitor. INR 1.2, needs to resume heparin bridge tmw. Will discuss resumption of Coumadin today.  2/21/24-Patient seen and examined today. Complains of mild suprapubic pressure/right flank pain. No CV complaints. INR 1.6, on Lovenox bridge. Coumadin resumed yesterday.       4/9/24  Penile prosthetic implant cancelled in Feb due to bleeding on heparin drip with blood clots in bladder.     From uro - CT RSS results  indicated Autosomal dominant polycystic kidney disease.  Resolution of the right hydronephrosis.  Resolution of the urinary bladder clot. He is to keep his appointment with DR. Shirley to discuss proceeding with IPP       BP and HR stable today. BMI 27 - 215 lbs     Results for orders placed during the hospital encounter of 04/27/23    Echo    Interpretation Summary  · The left ventricle is normal in size with concentric remodeling and low normal systolic function.  · Mild left atrial enlargement.  · Grade I left ventricular diastolic dysfunction.  · The estimated PA systolic pressure is 47 mmHg.  · Normal right ventricular size with normal right ventricular systolic function.  · There is pulmonary hypertension.  · Normal central venous pressure (3 mmHg).  · There is a bileaflet tilting disc mechanical aortic valve present. Prosthetic aortic valve is normal.  · The aortic valve mean gradient is 19 mmHg with a dimensionless index of 0.47.  · The estimated ejection fraction is 50%.  · The mean diastolic gradient across the mitral valve is 11 mmHg at a heart rate of 76 bpm.  · There is mild to moderate mitral stenosis.  · Mild to moderate tricuspid regurgitation.      Results for orders placed during the hospital encounter of 07/07/22    Echo    Interpretation Summary  · The left ventricle is normal in size with moderate concentric hypertrophy and normal systolic function.  · Mild left atrial enlargement.  · The estimated ejection fraction is 60%.  · Grade II left ventricular diastolic dysfunction.  · There is abnormal septal wall motion consistent with post-operative status.  · Normal right ventricular size with normal right ventricular systolic function.  · Mild right atrial enlargement.  · There is a bileaflet tilting disc mechanical aortic valve present.  · The aortic valve mean gradient is 15 mmHg with a dimensionless index of 0.52.  · The mean diastolic gradient across the mitral valve is 11 mmHg at a heart rate  of bpm.  · There is mild to moderate mitral stenosis.  · There is moderate pulmonary hypertension.  · Mild tricuspid regurgitation.  · Intermediate central venous pressure (8 mmHg).  · The estimated PA systolic pressure is 54 mmHg.  · The aortic root is mildly dilated.      RHC  Summary       The estimated blood loss was <50 mL.  Believe PA pressures are elevated in setting of increased filling pressures. Mean PA pressure is right around 34.  Recommend stricter volume management, review of echoes for the last 3 year demonstrates similar findings.  If this is a barrier to renal transplant, can consider adding PDE5, however do not think he will be able to tolerate given increased filling pressure and patient stating he drops pressures with HD.  Will likely need to discuss with abdominal transplant team.     The procedure log was documented by Documenter: Isis Sevilla and verified by Mariah Pierce MD.      Results for orders placed during the hospital encounter of 05/25/21    Nuclear Stress - Cardiology Interpreted    Interpretation Summary    Normal myocardial perfusion scan. There is no evidence of myocardial ischemia or infarction.    The gated perfusion images showed an ejection fraction of 65% at rest. The gated perfusion images showed an ejection fraction of 47% post stress.    The EKG portion of this study is negative for ischemia.    · Intermediate central venous pressure (8 mmHg).       Cath 2/17  1. Coronary angiography.      a.     Left main widely patent.      b.     LAD widely patent.      c.     Ramus widely patent and massive in size.      d.     Circumflex widely patent with a small OM 1 and OM 2 branch.      e.     Right coronary widely patent.  2. Hemodynamics:  Right heart catheterization.      a.     Pulmonary capillary wedge pressure 33 at end-expiration.      b.     Pulmonary artery pressure 78/42.      c.     Right ventricular pressure 70/21.      d.     Right atrial pressure 22 with a peak  V-wave of 24.      e.     Cardiac output by thermodilution is between 6 and 7       L/minute, by Patricia 6.1 L/minute.    CONCLUSION  1. Normal coronary arteries.  2. Pulmonary hypertension.  3. Severe aortic insufficiency.      Past Medical History:   Diagnosis Date    Anemia     Aortic valve stenosis     s/p mechanical AVR    Atrial fibrillation     Atrial flutter     Cardiomyopathy     CHF (congestive heart failure)     Drug-induced erectile dysfunction     ESRD due to hypertension     HD M, W, F    ESRD on dialysis     GERD (gastroesophageal reflux disease)     Hepatitis B     Hyperlipidemia     Hypertension     Nightmares     Obesity     DANIEL (obstructive sleep apnea) 11/12/2019    Secondary hyperparathyroidism of renal origin     Supraventricular tachycardia     atrial tachycardia    Tachycardia     Valvular regurgitation     AI, TR       Past Surgical History:   Procedure Laterality Date    ASD REPAIR      age 8 [facility]    AV Graft Creation Left 03/2017    CARDIAC CATHETERIZATION      [facility]     CARDIAC VALVE SURGERY  02/08/2017    22 mm Medtronic AV, 28 mm TV Medtronic annuloplaty ring    COLONOSCOPY N/A 8/27/2019    Procedure: COLONOSCOPY;  Surgeon: Addie John MD;  Location: [facility] ENDO;  Service: Endoscopy;  Laterality: N/A;  dialysis pt *needs K*    COLONOSCOPY N/A 9/3/2020    Procedure: COLONOSCOPY-need INR;  Surgeon: Jemma Youngblood MD;  Location: [facility] ENDO;  Service: Endoscopy;  Laterality: N/A;    ESOPHAGOGASTRODUODENOSCOPY N/A 8/27/2019    Procedure: EGD (ESOPHAGOGASTRODUODENOSCOPY);  Surgeon: Addie John MD;  Location: [facility];  Service: Endoscopy;  Laterality: N/A;    RADIOFREQUENCY ABLATION  03/13/2017    Atrial tachycardia    REMOVAL OF GRAFT Left 7/2/2020    Procedure: REMOVAL, GRAFT;  Surgeon: Sascha Sidhu MD;  Location: [facility] OR;  Service: Peripheral Vascular;  Laterality: Left;    RIGHT HEART CATHETERIZATION Right 8/12/2021    Procedure: INSERTION, CATHETER, RIGHT HEART;   Surgeon: Mariah Pierce MD;  Location: [facility] CATH LAB;  Service: Cardiology;  Laterality: Right;       Social History     Tobacco Use    Smoking status: Never    Smokeless tobacco: Never   Substance Use Topics    Alcohol use: No     Comment: quit in 2016; does have heavy drinking history; started drinking at age 13; was drinking a 12 pack over the weekend and two 24 ounces of beer a day during the week along with a pint of hard alcohol    Drug use: No       Family History   Problem Relation Age of Onset    Leukemia Mother     Cancer Mother     Heart attack Father         massive MI at age 67    No Known Problems Sister     No Known Problems Brother     No Known Problems Sister     No Known Problems Sister     Heart failure Paternal Grandmother     Diabetes Paternal Aunt     Hypertension Paternal Aunt     Leukemia Paternal Aunt         CLL    Suicide Paternal Uncle     Anesthesia problems Paternal Uncle     Diabetes Paternal Aunt     Stroke Paternal Aunt     Valvular heart disease Maternal Aunt     Kidney disease Neg Hx     Colon cancer Neg Hx            Review of Systems   Constitutional:  Positive for malaise/fatigue.   HENT: Negative.     Eyes: Negative.    Respiratory:  Negative for shortness of breath.    Cardiovascular:  Negative for chest pain and palpitations.   Gastrointestinal: Negative.    Genitourinary:  Negative for hematuria.   Musculoskeletal:  Positive for joint pain.   Skin: Negative.    Neurological: Negative.    Endo/Heme/Allergies: Negative.    Psychiatric/Behavioral: Negative.     All 12 systems otherwise negative.      Wt Readings from Last 3 Encounters:   04/04/24 98.8 kg (217 lb 13 oz)   03/12/24 98.8 kg (217 lb 13 oz)   03/12/24 98.8 kg (217 lb 13 oz)     Temp Readings from Last 3 Encounters:   03/12/24 96.4 °F (35.8 °C) (Tympanic)   02/22/24 97.9 °F (36.6 °C) (Oral)   01/18/24 97.7 °F (36.5 °C) (Oral)     BP Readings from Last 3 Encounters:   04/04/24 111/73   03/12/24 123/77   03/12/24  136/68     Pulse Readings from Last 3 Encounters:   04/04/24 94   03/12/24 72   03/12/24 81       There were no vitals taken for this visit.     Objective:   Physical Exam  Vitals and nursing note reviewed.   Constitutional:       General: He is not in acute distress.     Appearance: He is well-developed. He is not diaphoretic.   HENT:      Head: Normocephalic and atraumatic.      Nose: Nose normal.   Eyes:      General: No scleral icterus.     Conjunctiva/sclera: Conjunctivae normal.   Neck:      Thyroid: No thyromegaly.      Vascular: No JVD.   Cardiovascular:      Rate and Rhythm: Normal rate. Rhythm irregular.      Heart sounds: S1 normal and S2 normal. Murmur heard.      Midsystolic murmur is present at the upper right sternal border.      No friction rub. No gallop. No S3 or S4 sounds.      Comments: Crisp S2  Pulmonary:      Effort: Pulmonary effort is normal. No respiratory distress.      Breath sounds: Normal breath sounds. No stridor. No wheezing or rales.   Chest:      Chest wall: No tenderness.   Abdominal:      General: Bowel sounds are normal. There is no distension.      Palpations: Abdomen is soft. There is no mass.      Tenderness: There is no abdominal tenderness. There is no rebound.   Genitourinary:     Comments: Deferred  Musculoskeletal:         General: No tenderness or deformity. Normal range of motion.      Cervical back: Normal range of motion and neck supple.   Lymphadenopathy:      Cervical: No cervical adenopathy.   Skin:     General: Skin is warm and dry.      Coloration: Skin is not pale.      Findings: No erythema or rash.   Neurological:      Mental Status: He is alert and oriented to person, place, and time.      Motor: No abnormal muscle tone.      Coordination: Coordination normal.   Psychiatric:         Behavior: Behavior normal.         Thought Content: Thought content normal.         Judgment: Judgment normal.         Lab Results   Component Value Date     02/21/2024      02/21/2024    K 4.6 02/21/2024    K 4.6 02/21/2024    CL 97 02/21/2024    CL 96 02/21/2024    CO2 25 02/21/2024    CO2 26 02/21/2024    BUN 72 (H) 02/21/2024    BUN 73 (H) 02/21/2024    CREATININE 16.7 (H) 02/21/2024    CREATININE 16.7 (H) 02/21/2024    GLU 97 02/21/2024    GLU 98 02/21/2024    HGBA1C 4.9 03/12/2024    MG 2.3 08/12/2021    AST 10 10/10/2023    ALT 14 10/10/2023    ALBUMIN 2.8 (L) 02/21/2024    PROT 8.1 10/10/2023    BILITOT 0.5 10/10/2023    WBC 7.09 02/22/2024    HGB 11.8 (L) 02/22/2024    HCT 36.5 (L) 02/22/2024    HCT 25 (L) 02/08/2017     (H) 02/22/2024     (L) 02/22/2024    INR 5.4 (A) 04/04/2024    INR 1.9 (H) 02/22/2024    CHOL 248 (H) 03/12/2024    HDL 29 (L) 03/12/2024    LDLCALC 170.0 (H) 03/12/2024    TRIG 245 (H) 03/12/2024     (H) 07/01/2021     Assessment:      1. Preop cardiovascular exam    2. Anticoagulated    3. Chronic diastolic heart failure    4. On Coumadin for atrial fibrillation    5. History of mechanical aortic valve replacement    6. Long term (current) use of anticoagulants    7. NSVT (nonsustained ventricular tachycardia)    8. Aortic atherosclerosis    9. SVT (supraventricular tachycardia)    10. Pulmonary hypertension    11. Atrial tachycardia    12. S/P AVR (aortic valve replacement)    13. PVC's (premature ventricular contractions)    14. ESRD (end stage renal disease) on dialysis    15. PAF (paroxysmal atrial fibrillation)    16. Chronic viral hepatitis B without delta agent and without coma    17. H/O mechanical aortic valve replacement    18. History of radiofrequency ablation for complex right atrial arrhythmia    19. End-stage renal disease    20. Pulmonary HTN    21. Erectile dysfunction, unspecified erectile dysfunction type    22. H/O aortic valve replacement                Plan:   1. Chronic Diastolic HF  - cont low salt diet  - cont HD for volume removal    2. S/p mech AVR, with mild to moderate MS, mild to mod TR  - cont  coumadin  - f/u with coumadin clinic here  - f/u with CTS - in [place] Dr. Gastelum  - ECHO in July 2022 with normal bi V function, stable valves AV and MV, mild to mod MS, PASP 54 mmHg  -ECHO 4/2023 with normal bi V function, grade 1 DD, mech AV with mean 19mmHg, mild to mod MS - 11 mmHg. Mild to mod TR, PASP 47 mmHg.   - repeat ECHO ordered     3. ESRD - dry weight improved  - cont HD  - anemia with PRBCs and EPO    4. HTN   - cont meds  - adjusted BP meds - labetelol TID    5. PVCs, SVT s/p RFA , Afib - in NSR now   - cont BB and CCB   - f/u with Dr. De León  - recurrence of his old arrhythmia or could be a new source (AFL, R atriotomy, perimitral).  - discussed ablation.     6. HLD  - cont statin    7. ED  - cont tx PRN  - f/u urology for penile implant surgery   - was on Cialis, start Viagra f/u with urology   - monitor BP, avoid nitrates     8. Pulm HTN/ DANIEL  - PASP 56 mmHg. - repeat ECHO ordered   -f/u CHF/pulm HTN clinic - may start PDE5  - f/u with Dr. Pierce    9. Chronic hep B   - cont tx per hepatology  - further workup pending for cirrhosis - EGD    10.  Pre-OP CV evaluation prior to renal transplant  - Elevated CV risk for renal transplant but discuss with pulm HTN clinic  - may have further [facility]up at [facility]    11. Preop CV eval -  penile prosthesis implant with Dr. Shirley May 21st   - elevated CV risk due to significant valve disease, Afib, PVCs  - cont BB periop    - need to be bridged inpatient with heparin drip while INR drifts down for acceptable level for surgery and remain on heparin while coumadin restarted.   - Cardiology should be consulted preop while inpt, recommend cardiac anesthesiologist     Visit today included increased complexity associated with the care of the episodic problem SVT addressed and managing the longitudinal care of the patient due to the serious and/or complex managed problem(s) .      Thank you for allowing me to participate in this patient's care. Please do not hesitate  to contact me with any questions or concerns. Consult note has been forwarded to the referral physician.     Fuad Gudino MD, [facility]  Cardiovascular Disease  [facility], [place]  [phone] (P)

## 2024-04-15 RX ORDER — CALCIUM ACETATE 667 MG/1
TABLET ORAL
Qty: 330 TABLET | Refills: 12 | Status: CANCELLED | OUTPATIENT
Start: 2024-04-15

## 2024-04-25 NOTE — DISCHARGE INSTRUCTIONS
Hemorrhoids    Hemorrhoids are swollen and inflamed veins inside the rectum and near the anus. The rectum is the last several inches of the colon. The anus is the passage between the rectum and the outside of the body.  Causes  The veins can become swollen due to increased pressure in them. This is most often caused by:  Chronic constipation or diarrhea  Straining when having a bowel movement  Sitting too long on the toilet  A low-fiber diet  Pregnancy  Symptoms  Bleeding from the rectum (this may be noticeable after bowel movements)  Lump near the anus  Itching around the anus  Pain around the anus  There are different types of hemorrhoids. Depending on the type you have and the severity, you may be able to treat yourself at home. In some cases, a procedure may be the best treatment option. Your healthcare provider can tell you more about this, if needed.  Home care  General care  To get relief from pain or itching, try:  Topical products. Your healthcare provider may prescribe or recommend creams, ointments, or pads that can be applied to the hemorrhoid. Use these exactly as directed.  Medicines. Your healthcare provider may recommend stool softeners, suppositories, or laxatives to help manage constipation. Use these exactly as directed.  Sitz baths. A sitz bath involves sitting in a few inches of warm bath water. Be careful not to make the water so hot that you burn yourself--test it before sitting in it. Soak for about 10 to 15 minutes a few times a day. This may help relieve pain.  Tips to help prevent hemorrhoids  Eat more fiber. Fiber adds bulk to stool and absorbs water as it moves through your colon. This makes stool softer and easier to pass.  Increase the fiber in your diet with more fiber-rich foods. These include fresh fruit, vegetables, and whole grains.  Take a fiber supplement or bulking agent, if advised to by your provider. These include products such as psyllium or methylcellulose.  Drink plenty  It is recommended Elgin stay on 2.5mg of zepbound.  There is currently a national shortage on 5mg and she is unable to increase her dose.  She has not currently missed any doses of zepbound but will need to continue on this dose to increase it in the future.     of water, if directed to by your provider. This can help keep stool soft.  Be more active. Frequent exercise aids digestion and helps prevent constipation. It may also help make bowel movements more regular.  Dont strain during bowel movements. This can make hemorrhoids more likely. Also, dont sit on the toilet for long periods of time.  Follow-up care  Follow up with your healthcare provider, or as advised. If a culture or imaging tests were done, you will be notified of the results when they are ready. This may take a few days or longer.  When to seek medical advice  Call your healthcare provider right away if any of these occur:  Increased bleeding from the rectum  Increased pain around the rectum or anus  Weakness or dizziness  Call 911  Call 911 or return to the emergency department right away if any of these occur:  Trouble breathing or swallowing  Fainting or loss of consciousness  Unusually fast heart rate  Vomiting blood  Large amounts of blood in stool  Date Last Reviewed: 6/22/2015  © 7394-3419 [employer]. [address] [place] [address], [place] [address]. All rights reserved. This information is not intended as a substitute for professional medical care. Always follow your healthcare professional's instructions.        Understanding Colon and Rectal Polyps    The colon (also called the large intestine) is a muscular tube that forms the last part of the digestive tract. It absorbs water and stores food waste. The colon is about 4 to 6 feet long. The rectum is the last 6 inches of the colon. The colon and rectum have a smooth lining composed of millions of cells. Changes in these cells can lead to growths in the colon that can become cancerous and should be removed. Multiple tests are available to screen for colon cancer, but the colonoscopy is the most recommended test. During colonoscopy, these polyps can be removed. How often you need this test depends on many things including your condition,  your family history, symptoms, and what the findings were at the previous colonoscopy.   When the colon lining changes  Changes that happen in the cells that line the colon or rectum can lead to growths called polyps. Over a period of years, polyps can turn cancerous. Removing polyps early may prevent cancer from ever forming.  Polyps  Polyps are fleshy clumps of tissue that form on the lining of the colon or rectum. Small polyps are usually benign (not cancerous). However, over time, cells in a polyp can change and become cancerous. Certain types of polyps known as adenomatous polyps are premalignant. The risk for invasive cancer increases with the size of the polyp and certain cell and gene features. This means that they can become cancerous if they're not removed. Hyperplastic polyps are benign. They can grow quite large and not turn cancerous.   Cancer  Almost all colorectal cancers start when polyp cells begin growing abnormally. As a cancerous tumor grows, it may involve more and more of the colon or rectum. In time, cancer can also grow beyond the colon or rectum and spread to nearby organs or to glands called lymph nodes. The cells can also travel to other parts of the body. This is known as metastasis. The earlier a cancerous tumor is removed, the better the chance of preventing its spread.    Date Last Reviewed: 8/1/2016  © 5958-5385 [employer]. [address] [place] [address], [place] [address]. All rights reserved. This information is not intended as a substitute for professional medical care. Always follow your healthcare professional's instructions.        Colonoscopy     A camera attached to a flexible tube with a viewing lens is used to take video pictures.     Colonoscopy is a test to view the inside of your lower digestive tract (colon and rectum). Sometimes it can show the last part of the small intestine (ileum). During the test, small pieces of tissue may be removed for testing. This is  called a biopsy. Small growths, such as polyps, may also be removed.   Why is colonoscopy done?  The test is done to help look for colon cancer. And it can help find the source of abdominal pain, bleeding, and changes in bowel habits. It may be needed once a year, depending on factors such as your:  · Age  · Health history  · Family health history  · Symptoms  · Results from any prior colonoscopy  Risks and possible complications  These include:  · Bleeding               · A puncture or tear in the colon   · Risks of anesthesia  · A cancer lesion not being seen  Getting ready   To prepare for the test:  · Talk with your healthcare provider about the risks of the test (see below). Also ask your healthcare provider about alternatives to the test.  · Tell your healthcare provider about any medicines you take. Also tell him or her about any health conditions you may have.  · Make sure your rectum and colon are empty for the test. Follow the diet and bowel prep instructions exactly. If you dont, the test may need to be rescheduled.  · Plan for a friend or family member to drive you home after the test.     Colonoscopy provides an inside view of the entire colon.     You may discuss the results with your doctor right away or at a future visit.  During the test   The test is usually done in the hospital on an outpatient basis. This means you go home the same day. The procedure takes about 30 minutes. During that time:  · You are given relaxing (sedating) medicine through an IV line. You may be drowsy, or fully asleep.  · The healthcare provider will first give you a physical exam to check for anal and rectal problems.  · Then the anus is lubricated and the scope inserted.  · If you are awake, you may have a feeling similar to needing to have a bowel movement. You may also feel pressure as air is pumped into the colon. Its OK to pass gas during the procedure.  · Biopsy, polyp removal, or other treatments may be done  during the test.  After the test   You may have gas right after the test. It can help to try to pass it to help prevent later bloating. Your healthcare provider may discuss the results with you right away. Or you may need to schedule a follow-up visit to talk about the results. After the test, you can go back to your normal eating and other activities. You may be tired from the sedation and need to rest for a few hours.  Date Last Reviewed: 11/1/2016 © 2000-2017 [employer]. [address] [place] [address], [place] [address]. All rights reserved. This information is not intended as a substitute for professional medical care. Always follow your healthcare professional's instructions.

## 2024-05-02 ENCOUNTER — ANTI-COAG VISIT (OUTPATIENT)
Dept: CARDIOLOGY | Facility: CLINIC | Age: 54
End: 2024-05-02
Payer: MEDICARE

## 2024-05-02 ENCOUNTER — HOSPITAL ENCOUNTER (OUTPATIENT)
Dept: CARDIOLOGY | Facility: HOSPITAL | Age: 54
Discharge: HOME | End: 2024-05-02
Attending: INTERNAL MEDICINE
Payer: MEDICARE

## 2024-05-02 VITALS
HEIGHT: 74 IN | BODY MASS INDEX: 27.59 KG/M2 | SYSTOLIC BLOOD PRESSURE: 126 MMHG | WEIGHT: 215 LBS | HEART RATE: 78 BPM | DIASTOLIC BLOOD PRESSURE: 68 MMHG

## 2024-05-02 DIAGNOSIS — I49.3 PVC'S (PREMATURE VENTRICULAR CONTRACTIONS): ICD-10-CM

## 2024-05-02 DIAGNOSIS — Z95.2 S/P AVR (AORTIC VALVE REPLACEMENT): ICD-10-CM

## 2024-05-02 DIAGNOSIS — Z79.01 ANTICOAGULATED: ICD-10-CM

## 2024-05-02 DIAGNOSIS — B18.1 CHRONIC VIRAL HEPATITIS B WITHOUT DELTA AGENT AND WITHOUT COMA: ICD-10-CM

## 2024-05-02 DIAGNOSIS — I47.29 NSVT (NONSUSTAINED VENTRICULAR TACHYCARDIA): ICD-10-CM

## 2024-05-02 DIAGNOSIS — I47.10 SVT (SUPRAVENTRICULAR TACHYCARDIA): ICD-10-CM

## 2024-05-02 DIAGNOSIS — Z01.810 PREOP CARDIOVASCULAR EXAM: ICD-10-CM

## 2024-05-02 DIAGNOSIS — Z98.890 HISTORY OF RADIOFREQUENCY ABLATION FOR COMPLEX RIGHT ATRIAL ARRHYTHMIA: ICD-10-CM

## 2024-05-02 DIAGNOSIS — I27.20 PULMONARY HYPERTENSION: ICD-10-CM

## 2024-05-02 DIAGNOSIS — Z95.2 H/O AORTIC VALVE REPLACEMENT: ICD-10-CM

## 2024-05-02 DIAGNOSIS — N18.6 END-STAGE RENAL DISEASE: ICD-10-CM

## 2024-05-02 DIAGNOSIS — I48.91 ON COUMADIN FOR ATRIAL FIBRILLATION: ICD-10-CM

## 2024-05-02 DIAGNOSIS — Z79.01 LONG TERM (CURRENT) USE OF ANTICOAGULANTS: ICD-10-CM

## 2024-05-02 DIAGNOSIS — Z79.01 ON COUMADIN FOR ATRIAL FIBRILLATION: ICD-10-CM

## 2024-05-02 DIAGNOSIS — Z95.2 H/O MECHANICAL AORTIC VALVE REPLACEMENT: ICD-10-CM

## 2024-05-02 DIAGNOSIS — Z79.01 LONG TERM (CURRENT) USE OF ANTICOAGULANTS: Primary | ICD-10-CM

## 2024-05-02 DIAGNOSIS — I50.32 CHRONIC DIASTOLIC HEART FAILURE: ICD-10-CM

## 2024-05-02 DIAGNOSIS — Z95.2 HISTORY OF MECHANICAL AORTIC VALVE REPLACEMENT: ICD-10-CM

## 2024-05-02 LAB
AORTIC ROOT ANNULUS: 3.87 CM
ASCENDING AORTA: 2.84 CM
AV INDEX (PROSTH): 0.52
AV MEAN GRADIENT: 23 MMHG
AV PEAK GRADIENT: 45 MMHG
AV VALVE AREA BY VELOCITY RATIO: 1.44 CM²
AV VALVE AREA: 1.48 CM²
AV VELOCITY RATIO: 0.5
BSA FOR ECHO PROCEDURE: 2.26 M2
CV ECHO LV RWT: 0.72 CM
DOP CALC AO PEAK VEL: 3.35 M/S
DOP CALC AO VTI: 71.2 CM
DOP CALC LVOT AREA: 2.9 CM2
DOP CALC LVOT DIAMETER: 1.91 CM
DOP CALC LVOT PEAK VEL: 1.68 M/S
DOP CALC LVOT STROKE VOLUME: 105.1 CM3
DOP CALC MV VTI: 110 CM
DOP CALC RVOT PEAK VEL: 1.4 M/S
DOP CALC RVOT VTI: 26.6 CM
DOP CALCLVOT PEAK VEL VTI: 36.7 CM
E WAVE DECELERATION TIME: 486.23 MSEC
E/A RATIO: 1.22
E/E' RATIO: 46.44 M/S
ECHO LV POSTERIOR WALL: 1.51 CM (ref 0.6–1.1)
EJECTION FRACTION: 60 %
FRACTIONAL SHORTENING: 37 % (ref 28–44)
INR PPP: 2.2 (ref 2–3)
INTERVENTRICULAR SEPTUM: 1.49 CM (ref 0.6–1.1)
IVC DIAMETER: 1.6 CM
IVRT: 60.89 MSEC
LA MAJOR: 6.82 CM
LA MINOR: 6.53 CM
LA WIDTH: 4.5 CM
LEFT ATRIUM SIZE: 4.58 CM
LEFT ATRIUM VOLUME INDEX MOD: 51.7 ML/M2
LEFT ATRIUM VOLUME INDEX: 52.2 ML/M2
LEFT ATRIUM VOLUME MOD: 115.72 CM3
LEFT ATRIUM VOLUME: 116.88 CM3
LEFT INTERNAL DIMENSION IN SYSTOLE: 2.63 CM (ref 2.1–4)
LEFT VENTRICLE DIASTOLIC VOLUME INDEX: 34.73 ML/M2
LEFT VENTRICLE DIASTOLIC VOLUME: 77.79 ML
LEFT VENTRICLE MASS INDEX: 111 G/M2
LEFT VENTRICLE SYSTOLIC VOLUME INDEX: 11.3 ML/M2
LEFT VENTRICLE SYSTOLIC VOLUME: 25.26 ML
LEFT VENTRICULAR INTERNAL DIMENSION IN DIASTOLE: 4.18 CM (ref 3.5–6)
LEFT VENTRICULAR MASS: 247.8 G
LV LATERAL E/E' RATIO: 41.8 M/S
LV SEPTAL E/E' RATIO: 52.25 M/S
LVOT MG: 6.19 MMHG
LVOT MV: 1.2 CM/S
MV MEAN GRADIENT: 11 MMHG
MV PEAK A VEL: 1.71 M/S
MV PEAK E VEL: 2.09 M/S
MV PEAK GRADIENT: 26 MMHG
MV STENOSIS PRESSURE HALF TIME: 141.01 MS
MV VALVE AREA BY CONTINUITY EQUATION: 0.96 CM2
MV VALVE AREA P 1/2 METHOD: 1.56 CM2
OHS CV RV/LV RATIO: 0.85 CM
PISA MRMAX VEL: 5.8 M/S
PISA TR MAX VEL: 4.09 M/S
PV MEAN GRADIENT: 4 MMHG
PV PEAK GRADIENT: 8 MMHG
PV PEAK VELOCITY: 1.43 M/S
RA MAJOR: 5.26 CM
RA PRESSURE ESTIMATED: 3 MMHG
RA WIDTH: 3.65 CM
RIGHT VENTRICULAR END-DIASTOLIC DIMENSION: 3.57 CM
RV TB RVSP: 7 MMHG
SINUS: 3.11 CM
STJ: 3.34 CM
TDI LATERAL: 0.05 M/S
TDI SEPTAL: 0.04 M/S
TDI: 0.05 M/S
TR MAX PG: 67 MMHG
TRICUSPID ANNULAR PLANE SYSTOLIC EXCURSION: 1.59 CM
TV REST PULMONARY ARTERY PRESSURE: 70 MMHG
Z-SCORE OF LEFT VENTRICULAR DIMENSION IN END DIASTOLE: -6.51
Z-SCORE OF LEFT VENTRICULAR DIMENSION IN END SYSTOLE: -4.85

## 2024-05-02 PROCEDURE — 93306 TTE W/DOPPLER COMPLETE: CPT

## 2024-05-02 PROCEDURE — 93306 TTE W/DOPPLER COMPLETE: CPT | Mod: 26,,, | Performed by: STUDENT IN AN ORGANIZED HEALTH CARE EDUCATION/TRAINING PROGRAM

## 2024-05-02 PROCEDURE — 93793 ANTICOAG MGMT PT WARFARIN: CPT | Mod: ,,,

## 2024-05-02 PROCEDURE — 99999PBSHW POCT INR: Mod: PBBFAC,,,

## 2024-05-02 PROCEDURE — 85610 PROTHROMBIN TIME: CPT | Mod: PBBFAC

## 2024-05-02 NOTE — PROGRESS NOTES
Patient's INR is therapeutic at 2.2. Patient is scheduled for urology procedure on 5/21/24.  Patient was discharged from [facility] due to eruption of dialysis shunt. Sent to PharmD for dosing/instructions.

## 2024-05-16 ENCOUNTER — ANTI-COAG VISIT (OUTPATIENT)
Dept: CARDIOLOGY | Facility: CLINIC | Age: 54
DRG: 709 | End: 2024-05-16
Payer: MEDICARE

## 2024-05-16 DIAGNOSIS — Z95.2 HISTORY OF MECHANICAL AORTIC VALVE REPLACEMENT: ICD-10-CM

## 2024-05-16 DIAGNOSIS — I48.91 ON COUMADIN FOR ATRIAL FIBRILLATION: ICD-10-CM

## 2024-05-16 DIAGNOSIS — Z79.01 LONG TERM (CURRENT) USE OF ANTICOAGULANTS: Primary | ICD-10-CM

## 2024-05-16 DIAGNOSIS — Z79.01 ON COUMADIN FOR ATRIAL FIBRILLATION: ICD-10-CM

## 2024-05-16 LAB
CTP QC/QA: YES
INR PPP: 1.8 (ref 2–3)

## 2024-05-16 PROCEDURE — 93793 ANTICOAG MGMT PT WARFARIN: CPT | Mod: ,,,

## 2024-05-16 PROCEDURE — 99999PBSHW POCT INR: Mod: PBBFAC,,,

## 2024-05-16 PROCEDURE — 85610 PROTHROMBIN TIME: CPT | Mod: PBBFAC

## 2024-05-16 NOTE — PROGRESS NOTES
Patient's INR is sub-therapeutic at 1.8. Patient reports he followed previous instructions. Patient reports he ate some nuts. Advised of increased risk of clotting; signs/symptoms of clotting. Patient reports he is not having any signs/symptoms of clotting. Patient is scheduled for urology procedure on 5/21/24. Sent to PharmD for dosing/instructions.

## 2024-05-16 NOTE — PROGRESS NOTES
Pt will be admitted this Sunday for surgery on 5/21/24.  INR just under goal - 1.8.  We will not boost warfarin today since this may work against the plan to let INR drift down enough on surgery on 5/21.  He will have INR on 5/19 when admitted - start Heparin when <2.0.  STOP all vitamin K foods - list provided to patient.  Continue warfarin thru admit date per calendar since he is slightly below goal today.  Plan for repeat INR in 1 week after surgery for now.  Patient should however call our office for sooner follow up after d/c date is known.

## 2024-05-19 ENCOUNTER — HOSPITAL ENCOUNTER (INPATIENT)
Facility: HOSPITAL | Age: 54
LOS: 7 days | Discharge: HOME | DRG: 709 | End: 2024-05-26
Attending: INTERNAL MEDICINE | Admitting: UROLOGY
Payer: MEDICARE

## 2024-05-19 DIAGNOSIS — R07.9 CHEST PAIN: ICD-10-CM

## 2024-05-19 DIAGNOSIS — N52.2 DRUG-INDUCED ERECTILE DYSFUNCTION: ICD-10-CM

## 2024-05-19 DIAGNOSIS — N18.6 ESRD (END STAGE RENAL DISEASE) ON DIALYSIS: Chronic | ICD-10-CM

## 2024-05-19 DIAGNOSIS — R31.0 GROSS HEMATURIA: ICD-10-CM

## 2024-05-19 DIAGNOSIS — Z01.818 PREOPERATIVE CLEARANCE: ICD-10-CM

## 2024-05-19 DIAGNOSIS — N52.9 ERECTILE DYSFUNCTION, UNSPECIFIED ERECTILE DYSFUNCTION TYPE: Primary | ICD-10-CM

## 2024-05-19 DIAGNOSIS — Z96.89 HISTORY OF IMPLANTATION OF PENILE PROSTHESIS: ICD-10-CM

## 2024-05-19 DIAGNOSIS — Z01.810 PREOP CARDIOVASCULAR EXAM: ICD-10-CM

## 2024-05-19 DIAGNOSIS — Z99.2 ESRD (END STAGE RENAL DISEASE) ON DIALYSIS: Chronic | ICD-10-CM

## 2024-05-19 DIAGNOSIS — Z79.01 LONG TERM (CURRENT) USE OF ANTICOAGULANTS: ICD-10-CM

## 2024-05-19 PROCEDURE — 11000001 HC ACUTE MED/SURG PRIVATE ROOM

## 2024-05-19 PROCEDURE — 63600175 PHARM REV CODE 636 W HCPCS: Performed by: NURSE PRACTITIONER

## 2024-05-19 RX ORDER — ONDANSETRON HYDROCHLORIDE 2 MG/ML
4 INJECTION, SOLUTION INTRAVENOUS EVERY 8 HOURS PRN
Status: DISCONTINUED | OUTPATIENT
Start: 2024-05-19 | End: 2024-05-26 | Stop reason: HOSPADM

## 2024-05-19 RX ORDER — IPRATROPIUM BROMIDE AND ALBUTEROL SULFATE 2.5; .5 MG/3ML; MG/3ML
3 SOLUTION RESPIRATORY (INHALATION) EVERY 6 HOURS PRN
Status: DISCONTINUED | OUTPATIENT
Start: 2024-05-19 | End: 2024-05-26 | Stop reason: HOSPADM

## 2024-05-19 RX ORDER — SODIUM CHLORIDE 0.9 % (FLUSH) 0.9 %
10 SYRINGE (ML) INJECTION EVERY 12 HOURS PRN
Status: DISCONTINUED | OUTPATIENT
Start: 2024-05-19 | End: 2024-05-26 | Stop reason: HOSPADM

## 2024-05-19 RX ORDER — ALUMINUM HYDROXIDE, MAGNESIUM HYDROXIDE, AND SIMETHICONE 1200; 120; 1200 MG/30ML; MG/30ML; MG/30ML
30 SUSPENSION ORAL 4 TIMES DAILY PRN
Status: DISCONTINUED | OUTPATIENT
Start: 2024-05-19 | End: 2024-05-26 | Stop reason: HOSPADM

## 2024-05-19 RX ORDER — ACETAMINOPHEN 650 MG/1
650 SUPPOSITORY RECTAL EVERY 6 HOURS PRN
Status: DISCONTINUED | OUTPATIENT
Start: 2024-05-19 | End: 2024-05-26 | Stop reason: HOSPADM

## 2024-05-19 RX ORDER — ENOXAPARIN SODIUM 100 MG/ML
40 INJECTION SUBCUTANEOUS EVERY 12 HOURS
Status: COMPLETED | OUTPATIENT
Start: 2024-05-19 | End: 2024-05-20

## 2024-05-19 RX ORDER — ACETAMINOPHEN 325 MG/1
650 TABLET ORAL EVERY 8 HOURS PRN
Status: DISCONTINUED | OUTPATIENT
Start: 2024-05-19 | End: 2024-05-24

## 2024-05-19 RX ORDER — IBUPROFEN 200 MG
24 TABLET ORAL
Status: DISCONTINUED | OUTPATIENT
Start: 2024-05-19 | End: 2024-05-26 | Stop reason: HOSPADM

## 2024-05-19 RX ORDER — TALC
6 POWDER (GRAM) TOPICAL NIGHTLY PRN
Status: DISCONTINUED | OUTPATIENT
Start: 2024-05-19 | End: 2024-05-26 | Stop reason: HOSPADM

## 2024-05-19 RX ORDER — NALOXONE HCL 0.4 MG/ML
0.02 VIAL (ML) INJECTION
Status: DISCONTINUED | OUTPATIENT
Start: 2024-05-19 | End: 2024-05-26 | Stop reason: HOSPADM

## 2024-05-19 RX ORDER — AMOXICILLIN 250 MG
1 CAPSULE ORAL 2 TIMES DAILY PRN
Status: DISCONTINUED | OUTPATIENT
Start: 2024-05-19 | End: 2024-05-26 | Stop reason: HOSPADM

## 2024-05-19 RX ORDER — IBUPROFEN 200 MG
16 TABLET ORAL
Status: DISCONTINUED | OUTPATIENT
Start: 2024-05-19 | End: 2024-05-26 | Stop reason: HOSPADM

## 2024-05-19 RX ORDER — MORPHINE SULFATE 2 MG/ML
2 INJECTION, SOLUTION INTRAMUSCULAR; INTRAVENOUS EVERY 4 HOURS PRN
Status: DISCONTINUED | OUTPATIENT
Start: 2024-05-19 | End: 2024-05-26 | Stop reason: HOSPADM

## 2024-05-19 RX ORDER — HYDROCODONE BITARTRATE AND ACETAMINOPHEN 5; 325 MG/1; MG/1
1 TABLET ORAL EVERY 6 HOURS PRN
Status: DISCONTINUED | OUTPATIENT
Start: 2024-05-19 | End: 2024-05-24

## 2024-05-19 RX ORDER — ENOXAPARIN SODIUM 100 MG/ML
30 INJECTION SUBCUTANEOUS EVERY 12 HOURS
Status: DISCONTINUED | OUTPATIENT
Start: 2024-05-19 | End: 2024-05-19

## 2024-05-19 RX ORDER — GLUCAGON 1 MG
1 KIT INJECTION
Status: DISCONTINUED | OUTPATIENT
Start: 2024-05-19 | End: 2024-05-26 | Stop reason: HOSPADM

## 2024-05-19 RX ORDER — PROMETHAZINE HYDROCHLORIDE 25 MG/1
25 TABLET ORAL EVERY 6 HOURS PRN
Status: DISCONTINUED | OUTPATIENT
Start: 2024-05-19 | End: 2024-05-26 | Stop reason: HOSPADM

## 2024-05-19 RX ADMIN — ENOXAPARIN SODIUM 40 MG: 40 INJECTION SUBCUTANEOUS at 11:05

## 2024-05-20 ENCOUNTER — ANESTHESIA EVENT (OUTPATIENT)
Dept: SURGERY | Facility: HOSPITAL | Age: 54
DRG: 709 | End: 2024-05-20
Payer: MEDICARE

## 2024-05-20 LAB
ALBUMIN SERPL BCP-MCNC: 3.2 G/DL (ref 3.5–5.2)
ALP SERPL-CCNC: 58 U/L (ref 55–135)
ALT SERPL W/O P-5'-P-CCNC: 7 U/L (ref 10–44)
ANION GAP SERPL CALC-SCNC: 18 MMOL/L (ref 8–16)
AST SERPL-CCNC: 8 U/L (ref 10–40)
BASOPHILS # BLD AUTO: 0.01 K/UL (ref 0–0.2)
BASOPHILS NFR BLD: 0.1 % (ref 0–1.9)
BILIRUB SERPL-MCNC: 0.6 MG/DL (ref 0.1–1)
BUN SERPL-MCNC: 69 MG/DL (ref 6–20)
CALCIUM SERPL-MCNC: 10 MG/DL (ref 8.7–10.5)
CHLORIDE SERPL-SCNC: 100 MMOL/L (ref 95–110)
CO2 SERPL-SCNC: 24 MMOL/L (ref 23–29)
CREAT SERPL-MCNC: 16.1 MG/DL (ref 0.5–1.4)
DIFFERENTIAL METHOD BLD: ABNORMAL
EOSINOPHIL # BLD AUTO: 0.3 K/UL (ref 0–0.5)
EOSINOPHIL NFR BLD: 4.9 % (ref 0–8)
ERYTHROCYTE [DISTWIDTH] IN BLOOD BY AUTOMATED COUNT: 16.1 % (ref 11.5–14.5)
EST. GFR  (NO RACE VARIABLE): 3 ML/MIN/1.73 M^2
GLUCOSE SERPL-MCNC: 85 MG/DL (ref 70–110)
HCT VFR BLD AUTO: 30.7 % (ref 40–54)
HGB BLD-MCNC: 10 G/DL (ref 14–18)
IMM GRANULOCYTES # BLD AUTO: 0.01 K/UL (ref 0–0.04)
IMM GRANULOCYTES NFR BLD AUTO: 0.1 % (ref 0–0.5)
INR PPP: 1.6 (ref 0.8–1.2)
LYMPHOCYTES # BLD AUTO: 1.3 K/UL (ref 1–4.8)
LYMPHOCYTES NFR BLD: 19.8 % (ref 18–48)
MCH RBC QN AUTO: 32.3 PG (ref 27–31)
MCHC RBC AUTO-ENTMCNC: 32.6 G/DL (ref 32–36)
MCV RBC AUTO: 99 FL (ref 82–98)
MONOCYTES # BLD AUTO: 0.8 K/UL (ref 0.3–1)
MONOCYTES NFR BLD: 12 % (ref 4–15)
NEUTROPHILS # BLD AUTO: 4.2 K/UL (ref 1.8–7.7)
NEUTROPHILS NFR BLD: 63.1 % (ref 38–73)
NRBC BLD-RTO: 0 /100 WBC
OHS QRS DURATION: 132 MS
OHS QTC CALCULATION: 474 MS
PLATELET # BLD AUTO: 125 K/UL (ref 150–450)
PMV BLD AUTO: 11.1 FL (ref 9.2–12.9)
POTASSIUM SERPL-SCNC: 4.4 MMOL/L (ref 3.5–5.1)
PROT SERPL-MCNC: 7.3 G/DL (ref 6–8.4)
PROTHROMBIN TIME: 17.4 SEC (ref 9–12.5)
RBC # BLD AUTO: 3.1 M/UL (ref 4.6–6.2)
SODIUM SERPL-SCNC: 142 MMOL/L (ref 136–145)
WBC # BLD AUTO: 6.73 K/UL (ref 3.9–12.7)

## 2024-05-20 PROCEDURE — 80053 COMPREHEN METABOLIC PANEL: CPT | Performed by: HOSPITALIST

## 2024-05-20 PROCEDURE — 63600175 PHARM REV CODE 636 W HCPCS: Performed by: UROLOGY

## 2024-05-20 PROCEDURE — 86707 HEPATITIS BE ANTIBODY: CPT | Performed by: UROLOGY

## 2024-05-20 PROCEDURE — 99222 1ST HOSP IP/OBS MODERATE 55: CPT | Mod: ,,, | Performed by: INTERNAL MEDICINE

## 2024-05-20 PROCEDURE — 80074 ACUTE HEPATITIS PANEL: CPT | Performed by: UROLOGY

## 2024-05-20 PROCEDURE — 36415 COLL VENOUS BLD VENIPUNCTURE: CPT | Performed by: HOSPITALIST

## 2024-05-20 PROCEDURE — 87341 HEP B SURFACE AG NEUTRLZJ IA: CPT | Performed by: UROLOGY

## 2024-05-20 PROCEDURE — 25000003 PHARM REV CODE 250: Performed by: NURSE PRACTITIONER

## 2024-05-20 PROCEDURE — 5A1D70Z PERFORMANCE OF URINARY FILTRATION, INTERMITTENT, LESS THAN 6 HOURS PER DAY: ICD-10-PCS | Performed by: UROLOGY

## 2024-05-20 PROCEDURE — 85025 COMPLETE CBC W/AUTO DIFF WBC: CPT | Performed by: HOSPITALIST

## 2024-05-20 PROCEDURE — 11000001 HC ACUTE MED/SURG PRIVATE ROOM

## 2024-05-20 PROCEDURE — 93010 ELECTROCARDIOGRAM REPORT: CPT | Mod: ,,, | Performed by: INTERNAL MEDICINE

## 2024-05-20 PROCEDURE — 93005 ELECTROCARDIOGRAM TRACING: CPT

## 2024-05-20 PROCEDURE — 85610 PROTHROMBIN TIME: CPT | Performed by: UROLOGY

## 2024-05-20 PROCEDURE — 36415 COLL VENOUS BLD VENIPUNCTURE: CPT | Mod: XB | Performed by: UROLOGY

## 2024-05-20 RX ORDER — ATORVASTATIN CALCIUM 40 MG/1
40 TABLET, FILM COATED ORAL DAILY
Status: DISCONTINUED | OUTPATIENT
Start: 2024-05-20 | End: 2024-05-22

## 2024-05-20 RX ORDER — AMLODIPINE BESYLATE 10 MG/1
10 TABLET ORAL DAILY
Status: DISCONTINUED | OUTPATIENT
Start: 2024-05-20 | End: 2024-05-20

## 2024-05-20 RX ORDER — PANTOPRAZOLE SODIUM 40 MG/1
40 TABLET, DELAYED RELEASE ORAL DAILY
Status: DISCONTINUED | OUTPATIENT
Start: 2024-05-20 | End: 2024-05-26 | Stop reason: HOSPADM

## 2024-05-20 RX ORDER — HYDRALAZINE HYDROCHLORIDE 50 MG/1
100 TABLET, FILM COATED ORAL EVERY 8 HOURS
Status: DISCONTINUED | OUTPATIENT
Start: 2024-05-20 | End: 2024-05-22

## 2024-05-20 RX ORDER — DILTIAZEM HYDROCHLORIDE 420 MG/1
420 CAPSULE, EXTENDED RELEASE ORAL DAILY
Status: DISCONTINUED | OUTPATIENT
Start: 2024-05-20 | End: 2024-05-20

## 2024-05-20 RX ORDER — CALCIUM ACETATE 667 MG/1
2001 CAPSULE ORAL
Status: DISCONTINUED | OUTPATIENT
Start: 2024-05-20 | End: 2024-05-26 | Stop reason: HOSPADM

## 2024-05-20 RX ORDER — LABETALOL 100 MG/1
200 TABLET, FILM COATED ORAL 3 TIMES DAILY
Status: DISCONTINUED | OUTPATIENT
Start: 2024-05-20 | End: 2024-05-22

## 2024-05-20 RX ORDER — LISINOPRIL 20 MG/1
40 TABLET ORAL DAILY
Status: DISCONTINUED | OUTPATIENT
Start: 2024-05-20 | End: 2024-05-22

## 2024-05-20 RX ORDER — LANOLIN ALCOHOL/MO/W.PET/CERES
1 CREAM (GRAM) TOPICAL DAILY
Status: DISCONTINUED | OUTPATIENT
Start: 2024-05-20 | End: 2024-05-22

## 2024-05-20 RX ADMIN — HYDRALAZINE HYDROCHLORIDE 100 MG: 25 TABLET, FILM COATED ORAL at 05:05

## 2024-05-20 RX ADMIN — CALCIUM ACETATE 2001 MG: 667 CAPSULE ORAL at 09:05

## 2024-05-20 RX ADMIN — LISINOPRIL 40 MG: 20 TABLET ORAL at 09:05

## 2024-05-20 RX ADMIN — PANTOPRAZOLE SODIUM 40 MG: 40 TABLET, DELAYED RELEASE ORAL at 11:05

## 2024-05-20 RX ADMIN — ENOXAPARIN SODIUM 40 MG: 40 INJECTION SUBCUTANEOUS at 11:05

## 2024-05-20 RX ADMIN — THERA TABS 1 TABLET: TAB at 09:05

## 2024-05-20 RX ADMIN — ATORVASTATIN CALCIUM 40 MG: 40 TABLET, FILM COATED ORAL at 09:05

## 2024-05-20 RX ADMIN — FERROUS SULFATE TAB 325 MG (65 MG ELEMENTAL FE) 1 EACH: 325 (65 FE) TAB at 09:05

## 2024-05-20 RX ADMIN — HYDRALAZINE HYDROCHLORIDE 100 MG: 25 TABLET, FILM COATED ORAL at 09:05

## 2024-05-20 RX ADMIN — LABETALOL HYDROCHLORIDE 200 MG: 200 TABLET, FILM COATED ORAL at 08:05

## 2024-05-20 RX ADMIN — LABETALOL HYDROCHLORIDE 200 MG: 200 TABLET, FILM COATED ORAL at 09:05

## 2024-05-20 RX ADMIN — CALCIUM ACETATE 2001 MG: 667 CAPSULE ORAL at 06:05

## 2024-05-20 NOTE — MEDICAL/APP STUDENT
[facility] - Med Surg 3  [facility] Medicine  Progress Note    Patient Name: Isdiro White Jr.  MRN: [id number]  Patient Class: IP- Inpatient   Admission Date: 5/19/2024  Length of Stay: 1 days  Attending Physician: Bonifacio Shirley MD  Primary Care Provider: Boston Nevarez MD        Subjective:     Principal Problem:Preoperative clearance        HPI:  Isidro White Jr. is a 54 y.o. 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  Presented as a direct admit from Dr. Shirley with urology for scheduled penile prosthesis procedure.  Hospital Medicine consulted to manage medical problems and to bridge Lovenox and Coumadin for procedure.  Patient denies any complaints at this time.    PCP: Boston Nevarez        Interval History:   5/19: No Active Event Overnight. Pt complains about SOB and being fluid overload due to Dialysis pause.     Review of Systems   Constitutional: Negative.    HENT: Negative.     Eyes: Negative.    Respiratory:  Positive for shortness of breath.    Cardiovascular: Negative.    Gastrointestinal:  Positive for abdominal distention.   Endocrine: Negative.    Genitourinary: Negative.    Musculoskeletal: Negative.    Skin: Negative.      Objective:     Vital Signs (Most Recent):  Temp: 98 °F (36.7 °C) (05/20/24 0729)  Pulse: 102 (05/20/24 0825)  BP: (!) 152/78 (05/20/24 0729)  Resp: 15 (05/20/24 0729)  SpO2: 96 % (05/20/24 0729) Vital Signs (24h Range):  Temp:  [97.3 °F (36.3 °C)-98 °F (36.7 °C)] 98 °F (36.7 °C)  Pulse:  []  102  Resp:  [15-18] 15  SpO2:  [92 %-96 %] 96 %  BP: (152-158)/(74-78) 152/78   Weight: 104.5 kg (230 lb 6.1 oz)  Body mass index is 29.58 kg/m².  No intake or output data in the 24 hours ending 05/20/24 0936  Physical Exam  HENT:      Head: Normocephalic.   Eyes:      General: Scleral icterus present.   Cardiovascular:      Pulses: Normal pulses.      Heart sounds: Normal heart sounds. No murmur heard.  Pulmonary:      Breath sounds: Normal breath sounds.   Chest:          Comments: Surgical scar  Abdominal:      General: There is distension.      Palpations: Abdomen is soft. There is shifting dullness.      Tenderness: There is no abdominal tenderness.      Hernia: A hernia is present. Hernia is present in the umbilical area.   Musculoskeletal:         General: Normal range of motion.   Skin:     General: Skin is warm.   Neurological:      General: No focal deficit present.      Mental Status: He is alert and oriented to person, place, and time.         Significant Labs: All pertinent labs within the past 24 hours have been reviewed.    Significant Imaging: I have reviewed all pertinent imaging results/findings within the past 24 hours.      Assessment/Plan:      * Preoperative clearance  Admitted for heparin bridge from coumadin for scheduled penile prosthesis surgery to be performed by Dr. Shirley.  RCRI revealed class 4 risk with 15.0% 30 day risk of death, mi, cardiac arrest.  Plan:  -pre-op labs and imaging  -lovenox bridge for coumadin  -manage co-morbidities   -cards consult for surgical clearance  -urology primary team      GERD (gastroesophageal reflux disease)  Chronic. Stable. Currently asymptomatic. Home medications include PPI/Antacids as needed.  Plan:  -Continue PPI/Antacids as needed         Hypertriglyceridemia  Patient is chronically on statin.will continue for now. Last Lipid Panel:   Lab Results   Component Value Date    CHOL 248 (H) 03/12/2024    HDL 29 (L) 03/12/2024    LDLCALC 170.0 (H)  03/12/2024    TRIG 245 (H) 03/12/2024    CHOLHDL 11.7 (L) 03/12/2024     Plan:  -Continue home medication  -low fat/low calorie diet        Chronic hepatitis B  Patient has chronic liver disease due to hepatitis b. Their liver disease is compensated. Hepatology has not been consulted. We will obtain daily CBC, CMP, and INR.Will continue Tenofovir. Their most current Na-MELD is listed below.  MELD 3.0: 24 at 10/10/2023  9:27 AM  MELD-Na: 26 at 10/10/2023  9:27 AM  Calculated from:  Serum Creatinine: On dialysis. Using the maximum value.  Serum Sodium: 143 mmol/L (Using max of 137 mmol/L) at 10/10/2023  8:31 AM  Total Bilirubin: 0.5 mg/dL (Using min of 1 mg/dL) at 10/10/2023  8:31 AM  Serum Albumin: 3.3 g/dL at 10/10/2023  8:31 AM  INR(ratio): 1.8 at 10/10/2023  9:27 AM  Age at listing (hypothetical): 53 years  Sex: Male at 10/10/2023  9:27 AM         Atrial fibrillation  Patient with Paroxysmal (<7 days) atrial fibrillation which is controlled currently with Beta Blocker and Calcium Channel Blocker. Patient is currently in sinus rhythm.[id number] Score: The patient doesn't have any registry metric data available. HASBLED Score: . Anticoagulation indicated. Anticoagulation done with coumadin. Briding with lovenox for  penile prosthesis surgery .    Chronic diastolic heart failure  Patient is identified as having Diastolic (HFpEF) heart failure that is Chronic. CHF is currently controlled. Latest ECHO performed and demonstrates- Results for orders placed during the hospital encounter of 05/02/24    Echo    Interpretation Summary    Left Ventricle: The left ventricle is normal in size. Normal wall thickness. There is concentric remodeling. Normal wall motion. Ejection fraction by visual approximation is 60%. Grade II diastolic dysfunction.    Right Ventricle: Normal right ventricular cavity size. Wall thickness is normal. Systolic function is normal.    Left Atrium: Left atrium is severely dilated.    Aortic Valve:  "There is a tilting disc prosthetic valve in the aortic position.    Mitral Valve: Moderately calcified leaflets. Moderately restricted motion. There is mild to moderate stenosis. MVA 2.6 cm2 by planimetry, 1.6 cm2 by PHT.  The mean pressure gradient across the mitral valve is 11 mmHg at a heart rate of  bpm. There is mild regurgitation.    Tricuspid Valve: There is mild to moderate regurgitation.    Pulmonary Artery: There is pulmonary hypertension. The estimated pulmonary artery systolic pressure is 70 mmHg.    IVC/SVC: Normal venous pressure at 3 mmHg.  . Continue Beta Blocker ACE/ARB and monitor clinical status closely. Monitor on telemetry. Patient is off CHF pathway.  Monitor strict Is&Os and daily weights.  Place on fluid restriction of 1.5 L. Continue to stress to patient importance of self efficacy and  on diet for CHF. Last BNP reviewed- and noted below No results for input(s): "BNP", "BNPTRIAGEBLO" in the last 168 hours..            ESRD (end stage renal disease) on dialysis  Creatine stable for now. BMP reviewed- noted CrCl cannot be calculated (Patient's most recent lab result is older than the maximum 7 days allowed.). according to latest data. Based on current GFR, CKD stage is end stage.  Monitor UOP and serial BMP and adjust therapy as needed. Renally dose meds. Avoid nephrotoxic medications and procedures. Nephrology consult for HD while in hospital.    History of mechanical aortic valve replacement  Plan:  -continue anticoagulation therpay with  Lovenox while in hospital  -tele monitoring  -cards consulted for surgical clearance      Essential hypertension  Chronic, controlled. Latest blood pressure and vitals reviewed-     Temp:  [97.3 °F (36.3 °C)-97.8 °F (36.6 °C)]   Pulse:  [75-76]   Resp:  [18]   BP: (156-158)/(74-76)   SpO2:  [92 %-95 %] .   Home meds for hypertension were reviewed and noted below.   Hypertension Medications               amLODIPine (NORVASC) 10 MG tablet Take 1 tablet " by mouth daily    diltiaZEM (TIADYLT ER) 420 MG Cs24 Take 1 capsule (420 mg total) by mouth once daily.    hydrALAZINE (APRESOLINE) 100 MG tablet Take 1 tablet by mouth three times a day including dialysis days    labetaloL (NORMODYNE) 200 MG tablet Take 1 tablet (200 mg total) by mouth 3 (three) times daily.    lisinopriL (PRINIVIL,ZESTRIL) 40 MG tablet Take 1 tablet (40 mg total) by mouth once daily.    pep injection Inject 0.3 ml as directed     For compounding pharmacy use:   Add PAPAVERINE 30 mcg  Add PHENTOLAMINE 10 mg  Add ALPROSTADIL 100 mcg            While in the hospital, will manage blood pressure as follows; Continue home antihypertensive regimen    Will utilize p.r.n. blood pressure medication only if patient's blood pressure greater than 160/100 and he develops symptoms such as worsening chest pain or shortness of breath.      VTE Risk Mitigation (From admission, onward)           Ordered     enoxaparin injection 40 mg  Every 12 hours         05/19/24 2248     IP VTE HIGH RISK PATIENT  Once         05/19/24 2103     Place sequential compression device  Until discontinued         05/19/24 2103     Reason for No Pharmacological VTE Prophylaxis  Once        Question:  Reasons:  Answer:  Physician Provided (leave comment)  Comment:  pending surgical intervention    05/19/24 2103                    Discharge Planning   SARBJIT:      Code Status: Full Code   Is the patient medically ready for discharge?:     Reason for patient still in hospital (select all that apply): Treatment                     Lamine Valentin  Department of Hospital Medicine   [facility] - Med S[facility] 3

## 2024-05-20 NOTE — ASSESSMENT & PLAN NOTE
Creatine stable for now. BMP reviewed- noted CrCl cannot be calculated (Patient's most recent lab result is older than the maximum 7 days allowed.). according to latest data. Based on current GFR, CKD stage is end stage.  Monitor UOP and serial BMP and adjust therapy as needed. Renally dose meds. Avoid nephrotoxic medications and procedures. Nephrology consult for HD while in hospital.

## 2024-05-20 NOTE — PROGRESS NOTES
[facility] [address] [place] [address] Medicine  Progress Note    Patient Name: Isidro White Jr.  MRN: [id number]  Patient Class: IP- Inpatient   Admission Date: 5/19/2024  Length of Stay: 1 days  Attending Physician: Bonifacio Shirley MD  Primary Care Provider: Boston Nevarez MD        Subjective:     Principal Problem:Preoperative clearance        HPI:  Isidro White Jr. is a 54 y.o. 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  Presented as a direct admit from Dr. Shirley with urology for scheduled penile prosthesis procedure.  Hospital Medicine consulted to manage medical problems and to bridge Lovenox and Coumadin for procedure.  Patient denies any complaints at this time.    PCP: Boston Nevarez    Overview/Hospital Course:  Patient is a 51-year-old male with a history of end-stage renal disease, anemia, aortic valve stenosis, AFib flutter, CHF, GERD, chronic hep B, hypertension, DANIEL, secondary hyperparathyroidism who presented as a direct admit from the urology service for scheduled penile prosthesis.  Patient was on Coumadin due to his mechanical valve and therefore he is undergoing Lovenox Coumadin bridge for procedure.      Interval History:  Patient reports feeling short of breath and tight due to not having dialysis at time of visit.  Dialysis as scheduled and should begin within 45 minutes according to the dialysis unit.    Review of Systems   Respiratory:  Positive for chest tightness.    Cardiovascular:  Negative for palpitations.    Gastrointestinal:  Positive for abdominal distention.   All other systems reviewed and are negative.    Objective:     Vital Signs (Most Recent):  Temp: 97.9 °F (36.6 °C) (05/20/24 1150)  Pulse: 78 (05/20/24 1150)  Resp: 18 (05/20/24 1150)  BP: (!) 144/75 (05/20/24 1150)  SpO2: (!) 93 % (05/20/24 1150) Vital Signs (24h Range):  Temp:  [97.3 °F (36.3 °C)-98 °F (36.7 °C)] 97.9 °F (36.6 °C)  Pulse:  [] 78  Resp:  [15-18] 18  SpO2:  [92 %-96 %] 93 %  BP: (144-158)/(74-78) 144/75     Weight: 104.5 kg (230 lb 6.1 oz)  Body mass index is 29.58 kg/m².  No intake or output data in the 24 hours ending 05/20/24 1756      Physical Exam  Vitals reviewed.   Constitutional:       Appearance: Normal appearance.   HENT:      Head: Normocephalic and atraumatic.      Mouth/Throat:      Mouth: Mucous membranes are moist.      Pharynx: Oropharynx is clear.   Eyes:      Extraocular Movements: Extraocular movements intact.      Conjunctiva/sclera: Conjunctivae normal.   Cardiovascular:      Rate and Rhythm: Normal rate and regular rhythm.      Pulses: Normal pulses.      Heart sounds: Normal heart sounds.      Comments: AV fistula with good thrill and bruit  Pulmonary:      Effort: Pulmonary effort is normal.      Breath sounds: Normal breath sounds.   Abdominal:      General: Bowel sounds are normal.      Palpations: Abdomen is soft.   Musculoskeletal:         General: Normal range of motion.      Cervical back: Normal range of motion and neck supple.   Skin:     General: Skin is warm and dry.   Neurological:      General: No focal deficit present.      Mental Status: He is alert and oriented to person, place, and time. Mental status is at baseline.   Psychiatric:         Mood and Affect: Mood normal.         Behavior: Behavior normal.         Thought Content: Thought content normal.             Significant Labs: All pertinent labs within the past 24 hours have been reviewed.  CBC:   Recent Labs   Lab 05/20/24  0417   WBC 6.73    HGB 10.0*   HCT 30.7*   *     CMP:   Recent Labs   Lab 05/20/24  0417      K 4.4      CO2 24   GLU 85   BUN 69*   CREATININE 16.1*   CALCIUM 10.0   PROT 7.3   ALBUMIN 3.2*   BILITOT 0.6   ALKPHOS 58   AST 8*   ALT 7*   ANIONGAP 18*     Coagulation:   Recent Labs   Lab 05/20/24  0725   INR 1.6*       Significant Imaging: I have reviewed all pertinent imaging results/findings within the past 24 hours.    Assessment/Plan:      * Preoperative clearance  Admitted for heparin bridge from coumadin for scheduled penile prosthesis surgery to be performed by Dr. Shirley.  RCRI revealed class 4 risk with 15.0% 30 day risk of death, mi, cardiac arrest.  Plan:  -pre-op labs and imaging  -lovenox bridge for coumadin  -manage co-morbidities   -cards consult for surgical clearance  -urology primary team      GERD (gastroesophageal reflux disease)  Chronic. Stable. Currently asymptomatic. Home medications include PPI/Antacids as needed.  Plan:  -Continue PPI/Antacids as needed         Hypertriglyceridemia  Patient is chronically on statin.will continue for now. Last Lipid Panel:   Lab Results   Component Value Date    CHOL 248 (H) 03/12/2024    HDL 29 (L) 03/12/2024    LDLCALC 170.0 (H) 03/12/2024    TRIG 245 (H) 03/12/2024    CHOLHDL 11.7 (L) 03/12/2024     Plan:  -Continue home medication  -low fat/low calorie diet        Chronic hepatitis B  Patient has chronic liver disease due to hepatitis b. Their liver disease is compensated.       Atrial fibrillation  Patient with Paroxysmal (<7 days) atrial fibrillation which is controlled currently with Beta Blocker and Calcium Channel Blocker. Patient is currently in sinus rhythm.[id number] Score: The patient doesn't have any registry metric data available. HASBLED Score: . Anticoagulation indicated. Anticoagulation done with coumadin. Bridging with lovenox for  penile prosthesis surgery .    Chronic diastolic heart failure  Patient is identified as having Diastolic  (HFpEF) heart failure that is Chronic. CHF is currently controlled. Latest ECHO performed and demonstrates- Results for orders placed during the hospital encounter of 05/02/24    Echo    Interpretation Summary    Left Ventricle: The left ventricle is normal in size. Normal wall thickness. There is concentric remodeling. Normal wall motion. Ejection fraction by visual approximation is 60%. Grade II diastolic dysfunction.    Right Ventricle: Normal right ventricular cavity size. Wall thickness is normal. Systolic function is normal.    Left Atrium: Left atrium is severely dilated.    Aortic Valve: There is a tilting disc prosthetic valve in the aortic position.    Mitral Valve: Moderately calcified leaflets. Moderately restricted motion. There is mild to moderate stenosis. MVA 2.6 cm2 by planimetry, 1.6 cm2 by PHT.  The mean pressure gradient across the mitral valve is 11 mmHg at a heart rate of  bpm. There is mild regurgitation.    Tricuspid Valve: There is mild to moderate regurgitation.    Pulmonary Artery: There is pulmonary hypertension. The estimated pulmonary artery systolic pressure is 70 mmHg.    IVC/SVC: Normal venous pressure at 3 mmHg.  . Continue Beta Blocker ACE/ARB and monitor clinical status closely. Monitor on telemetry. Patient is off CHF pathway.  Monitor strict Is&Os and daily weights.  Place on fluid restriction of 1.5 L. Continue to stress to patient importance of self efficacy and  on diet for CHF.             ESRD (end stage renal disease) on dialysis   Nephrology consult for HD while in hospital.  Usual chronic maintenance hemodialysis Monday Wednesday Friday [facility]    History of mechanical aortic valve replacement  Plan:  -continue anticoagulation therpay with  Lovenox while in hospital  -tele monitoring  -cards consulted for surgical clearance      Essential hypertension  Chronic, controlled. Latest blood pressure and vitals reviewed-     Temp:  [97.3 °F (36.3 °C)-98 °F (36.7  °C)]   Pulse:  []   Resp:  [15-20]   BP: (131-158)/(74-86)   SpO2:  [92 %-96 %] .   Home meds for hypertension were reviewed and noted below.   Hypertension Medications               amLODIPine (NORVASC) 10 MG tablet Take 1 tablet by mouth daily    diltiaZEM (TIADYLT ER) 420 MG Cs24 Take 1 capsule (420 mg total) by mouth once daily.    hydrALAZINE (APRESOLINE) 100 MG tablet Take 1 tablet by mouth three times a day including dialysis days    labetaloL (NORMODYNE) 200 MG tablet Take 1 tablet (200 mg total) by mouth 3 (three) times daily.    lisinopriL (PRINIVIL,ZESTRIL) 40 MG tablet Take 1 tablet (40 mg total) by mouth once daily.                 While in the hospital, will manage blood pressure as follows; Continue home antihypertensive regimen    Will utilize p.r.n. blood pressure medication only if patient's blood pressure greater than 160/100 and he develops symptoms such as worsening chest pain or shortness of breath.      VTE Risk Mitigation (From admission, onward)           Ordered     IP VTE HIGH RISK PATIENT  Once         05/19/24 2103     Place sequential compression device  Until discontinued         05/19/24 2103     Reason for No Pharmacological VTE Prophylaxis  Once        Question:  Reasons:  Answer:  Physician Provided (leave comment)  Comment:  pending surgical intervention    05/19/24 2103                    Discharge Planning   SARBJIT:      Code Status: Full Code   Is the patient medically ready for discharge?:     Reason for patient still in hospital (select all that apply): Patient trending condition and Treatment  Discharge Plan A: Home                  Federica Hu MD  Department of Hospital Medicine   [facility]

## 2024-05-20 NOTE — H&P
Chief Complaint: erectile dysfunction, preop clearance    HPI:   5/20/24-  54 year old male with significant cardiac and renal disease.  ED not treated by conservative measures and here for preoperative clearance for his IPP in the AM.  Currently no symptoms or issues, due for dialysis today.      Allergies:  Patient has no known allergies.    Medications:  See MAR    Review of Systems:  General: No fever, chills, fatigability, or weight loss.  Skin: No rashes, itching, or changes in color or texture of skin.  Chest: Denies HAY, cyanosis, wheezing, cough, and sputum production.  Abdomen: Appetite fine. No weight loss. Denies diarrhea, abdominal pain, hematemesis, or blood in stool.  Musculoskeletal: No joint stiffness or swelling. Denies back pain.  : As above.  All other review of systems negative.    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    PSH:   has a past surgical history that includes ASD repair; Cardiac valuve replacement (02/08/2017); Radiofrequency ablation (03/13/2017); Cardiac catheterization; AV Graft Creation (Left, 03/2017); Colonoscopy (N/A, 8/27/2019); Esophagogastroduodenoscopy (N/A, 8/27/2019); Removal of graft (Left, 7/2/2020); Colonoscopy (N/A, 9/3/2020); and Right heart catheterization (Right, 8/12/2021).    FamHx: family history includes Anesthesia problems in his paternal uncle; Cancer in his mother; Diabetes in his paternal aunt and paternal aunt; Heart attack in his father; Heart failure in his paternal grandmother; Hypertension in his  paternal aunt; Leukemia in his mother and paternal aunt; No Known Problems in his brother, sister, sister, and sister; Stroke in his paternal aunt; Suicide in his paternal uncle; Valvular heart disease in his maternal aunt.    SocHx:  reports that he has never smoked. He has never used smokeless tobacco. He reports that he does not drink alcohol and does not use drugs.      Physical Exam:  Vitals:    05/20/24 0825   BP:    Pulse: 102   Resp:    Temp:      General: A&Ox3, no apparent distress, no deformities  Neck: No masses, normal ROM  Lungs: normal inspiration, no use of accessory muscles  Heart: normal pulse, no arrhythmias  Abdomen: Soft, NT, ND, no masses, no hernias, no hepatosplenomegaly  Skin: The skin is warm and dry. No jaundice.  Ext: No c/c/e.    Labs/Studies:   CBC stable.  BUN and creatinine are 69 and 16.1 respectively.    Impression/Plan:     ED- appreciate cardiac clearance today.  Lovenox to be held for this evening and AM, NPO after midnight.  Cardiac anesthesia tomorrow and proceed with an IPP elective.  To dialysis today, nephrology has been consulted.

## 2024-05-20 NOTE — ASSESSMENT & PLAN NOTE
Patient is identified as having Diastolic (HFpEF) heart failure that is Chronic. CHF is currently controlled. Latest ECHO performed and demonstrates- Results for orders placed during the hospital encounter of 05/02/24    Echo    Interpretation Summary    Left Ventricle: The left ventricle is normal in size. Normal wall thickness. There is concentric remodeling. Normal wall motion. Ejection fraction by visual approximation is 60%. Grade II diastolic dysfunction.    Right Ventricle: Normal right ventricular cavity size. Wall thickness is normal. Systolic function is normal.    Left Atrium: Left atrium is severely dilated.    Aortic Valve: There is a tilting disc prosthetic valve in the aortic position.    Mitral Valve: Moderately calcified leaflets. Moderately restricted motion. There is mild to moderate stenosis. MVA 2.6 cm2 by planimetry, 1.6 cm2 by PHT.  The mean pressure gradient across the mitral valve is 11 mmHg at a heart rate of  bpm. There is mild regurgitation.    Tricuspid Valve: There is mild to moderate regurgitation.    Pulmonary Artery: There is pulmonary hypertension. The estimated pulmonary artery systolic pressure is 70 mmHg.    IVC/SVC: Normal venous pressure at 3 mmHg.  . Continue Beta Blocker ACE/ARB and monitor clinical status closely. Monitor on telemetry. Patient is off CHF pathway.  Monitor strict Is&Os and daily weights.  Place on fluid restriction of 1.5 L. Continue to stress to patient importance of self efficacy and  on diet for CHF.

## 2024-05-20 NOTE — SUBJECTIVE & OBJECTIVE
Past Medical History:   Diagnosis Date    Anemia     Aortic valve stenosis     s/p mechanical AVR    Atrial fibrillation     Atrial flutter     Cardiomyopathy     CHF (congestive heart failure)     Drug-induced erectile dysfunction     ESRD due to hypertension     HD M, W, F    ESRD on dialysis     GERD (gastroesophageal reflux disease)     Hepatitis B     Hyperlipidemia     Hypertension     Nightmares     Obesity     DANIEL (obstructive sleep apnea) 11/12/2019    Secondary hyperparathyroidism of renal origin     Supraventricular tachycardia     atrial tachycardia    Tachycardia     Valvular regurgitation     AI, TR       Past Surgical History:   Procedure Laterality Date    ASD REPAIR      age 8 [facility]    AV Graft Creation Left 03/2017    CARDIAC CATHETERIZATION      [facility]     CARDIAC VALVE SURGERY  02/08/2017    22 mm Medtronic AV, 28 mm TV Medtronic annuloplaty ring    COLONOSCOPY N/A 8/27/2019    Procedure: COLONOSCOPY;  Surgeon: Addie John MD;  Location: [facility] ENDO;  Service: Endoscopy;  Laterality: N/A;  dialysis pt *needs K*    COLONOSCOPY N/A 9/3/2020    Procedure: COLONOSCOPY-need INR;  Surgeon: Jemma Youngblood MD;  Location: [facility] ENDO;  Service: Endoscopy;  Laterality: N/A;    ESOPHAGOGASTRODUODENOSCOPY N/A 8/27/2019    Procedure: EGD (ESOPHAGOGASTRODUODENOSCOPY);  Surgeon: Addie John MD;  Location: [facility] ENDO;  Service: Endoscopy;  Laterality: N/A;    RADIOFREQUENCY ABLATION  03/13/2017    Atrial tachycardia    REMOVAL OF GRAFT Left 7/2/2020    Procedure: REMOVAL, GRAFT;  Surgeon: Sascha Sidhu MD;  Location: [facility] OR;  Service: Peripheral Vascular;  Laterality: Left;    RIGHT HEART CATHETERIZATION Right 8/12/2021    Procedure: INSERTION, CATHETER, RIGHT HEART;  Surgeon: Mariah Pierce MD;  Location: [facility] CATH LAB;  Service: Cardiology;  Laterality: Right;       Review of patient's allergies indicates:  No Known Allergies    Current Facility-Administered Medications on File Prior to  Encounter   Medication    0.9%  NaCl infusion    sodium chloride 0.9% flush 10 mL     Current Outpatient Medications on File Prior to Encounter   Medication Sig    albuterol (VENTOLIN HFA) 90 mcg/actuation inhaler Inhale 2 puffs into the lungs every 6 (six) hours as needed for Wheezing or Shortness of Breath. Rescue (Patient not taking: Reported on 1/25/2024)    amLODIPine (NORVASC) 10 MG tablet Take 1 tablet by mouth daily    ascorbic acid, vitamin C, (VITAMIN C) 500 MG tablet Take 1 tablet (500 mg total) by mouth 2 (two) times daily.    b complex vitamins tablet Take 1 tablet by mouth once daily.    B complex-vitamin C-folic acid (ETHAN-RACQUEL) 0.8 mg Tab Take 1 tablet by mouth once daily    calcium acetate,phosphat bind, (PHOSLO) 667 mg capsule Take 3 capsules by mouth 3 times daily with meals and 1 capsule by mouth twice daily with snacks    calcium acetate,phosphat bind, (PHOSLO) 667 mg tablet Take 3 capsules by mouth three times daily with meals AND take 1 capsule by mouth twice daily with snacks    dialysis solutions (PERITONEAL DIALYSIS SOLUTION IP) Inject into the peritoneum every Mon, Wed, Fri. Lt FA Fistula, [facility] @ [facility] on M, W, F.    diltiaZEM (TIADYLT ER) 420 MG Cs24 Take 1 capsule (420 mg total) by mouth once daily.    epoetin vikki (PROCRIT) 10,000 unit/mL injection Inject 1 mL (10,000 Units total) into the skin every Mon, Wed, Fri. To be given with HD    ferrous sulfate 324 mg (65 mg iron) TbEC Take 325 mg by mouth once daily.    hydrALAZINE (APRESOLINE) 100 MG tablet Take 1 tablet by mouth three times a day including dialysis days    hydrOXYzine HCL (ATARAX) 25 MG tablet Take 1 tablet by mouth daily as needed for itching    hydrOXYzine HCL (ATARAX) 25 MG tablet take one tablet by mouth daily as needed for itching    labetaloL (NORMODYNE) 200 MG tablet Take 1 tablet (200 mg total) by mouth 3 (three) times daily.    lisinopriL (PRINIVIL,ZESTRIL) 40 MG tablet Take 1 tablet (40 mg total) by  mouth once daily.    multivitamin with minerals tablet Take 1 tablet by mouth once daily.    omega-3 fatty acids-vitamin E (FISH OIL) 1,000 mg Cap Take 1 capsule by mouth once daily.    pantoprazole (PROTONIX) 40 MG tablet Take 1 tablet by mouth once daily    pep injection Inject 0.3 ml as directed     For compounding pharmacy use:   Add PAPAVERINE 30 mcg  Add PHENTOLAMINE 10 mg  Add ALPROSTADIL 100 mcg    rosuvastatin (CRESTOR) 10 MG tablet Take 1 tablet (10 mg total) by mouth once daily.    sildenafiL (VIAGRA) 100 MG tablet Take 1 tablet (100 mg total) by mouth daily as needed for Erectile Dysfunction.    tenofovir disoproxil fumarate (VIREAD) 300 mg Tab TAKE 1 TABLET BY MOUTH ONCE WEEKLY    tenofovir disoproxil fumarate (VIREAD) 300 mg Tab Take 1 tablet (300 mg total) by mouth once a week.    vitamin D (VITAMIN D3) 1000 units Tab Take 1 tablet by mouth daily    warfarin (COUMADIN) 7.5 MG tablet Take 2 tablets by mouth on Tuesday, Thursdays, and Saturday; and 1 tablet on all other days of the week or as directed by coumadin clinic.    warfarin (COUMADIN) 7.5 MG tablet Take 1 tablet (7.5 mg) on Mondays, Wednesdays and Fridays; and 1 and 1/2 tablets (11.25 mg) all other days OR AS DIRECTED BY COUMADIN CLINIC     Family History       Problem Relation (Age of Onset)    Anesthesia problems Paternal Uncle    Cancer Mother    Diabetes Paternal Aunt, Paternal Aunt    Heart attack Father    Heart failure Paternal Grandmother    Hypertension Paternal Aunt    Leukemia Mother, Paternal Aunt    No Known Problems Sister, Brother, Sister, Sister    Stroke Paternal Aunt    Suicide Paternal Uncle    Valvular heart disease Maternal Aunt          Tobacco Use    Smoking status: Never    Smokeless tobacco: Never   Substance and Sexual Activity    Alcohol use: No     Comment: quit in 2016; does have heavy drinking history; started drinking at age 13; was drinking a 12 pack over the weekend and two 24 ounces of beer a day during the  week along with a pint of hard alcohol    Drug use: No    Sexual activity: Not Currently     Review of Systems   Respiratory:  Negative for cough, shortness of breath and wheezing.    Cardiovascular:  Negative for chest pain, palpitations and leg swelling.   Genitourinary:  Negative for difficulty urinating, dysuria, hematuria, penile discharge and penile pain.   All other systems reviewed and are negative.    Objective:     Vital Signs (Most Recent):  Temp: 97.8 °F (36.6 °C) (05/20/24 0409)  Pulse: 76 (05/20/24 0409)  Resp: 18 (05/20/24 0409)  BP: (!) 156/74 (05/20/24 0409)  SpO2: 95 % (05/20/24 0409) Vital Signs (24h Range):  Temp:  [97.3 °F (36.3 °C)-97.8 °F (36.6 °C)] 97.8 °F (36.6 °C)  Pulse:  [75-76] 76  Resp:  [18] 18  SpO2:  [92 %-95 %] 95 %  BP: (156-158)/(74-76) 156/74     Weight: 104.5 kg (230 lb 6.1 oz)  Body mass index is 29.58 kg/m².     Physical Exam  Vitals and nursing note reviewed.   Constitutional:       General: He is awake. He is not in acute distress.     Appearance: Normal appearance. He is well-developed and well-groomed. He is not ill-appearing, toxic-appearing or diaphoretic.   HENT:      Head: Normocephalic and atraumatic.      Mouth/Throat:      Mouth: Mucous membranes are moist.      Pharynx: Oropharynx is clear.   Eyes:      Extraocular Movements: Extraocular movements intact.      Conjunctiva/sclera: Conjunctivae normal.   Cardiovascular:      Rate and Rhythm: Normal rate and regular rhythm.      Pulses: Normal pulses.      Heart sounds: Murmur heard.      Arteriovenous access: Left arteriovenous access is present.     Comments:   Left lower extremity with positive bruit and thrill noted  Pulmonary:      Effort: Pulmonary effort is normal.      Breath sounds: Normal breath sounds. No decreased breath sounds, wheezing, rhonchi or rales.   Abdominal:      General: Bowel sounds are normal.      Palpations: Abdomen is soft.      Tenderness: There is no abdominal tenderness.    Musculoskeletal:      Cervical back: Normal range of motion and neck supple.      Right lower leg: No edema.      Left lower leg: No edema.      Comments: 5/5 strength throughout   Skin:     General: Skin is warm and dry.      Capillary Refill: Capillary refill takes less than 2 seconds.   Neurological:      General: No focal deficit present.      Mental Status: He is alert and oriented to person, place, and time. Mental status is at baseline.      GCS: GCS eye subscore is 4. GCS verbal subscore is 5. GCS motor subscore is 6.      Cranial Nerves: Cranial nerves 2-12 are intact.      Sensory: Sensation is intact.   Psychiatric:         Mood and Affect: Mood normal.         Behavior: Behavior normal. Behavior is cooperative.        LABS:  No results found for this or any previous visit (from the past 24 hour(s)).    RADIOLOGY  Echo    Result Date: 5/2/2024    Left Ventricle: The left ventricle is normal in size. Normal wall thickness. There is concentric remodeling. Normal wall motion. Ejection fraction by visual approximation is 60%. Grade II diastolic dysfunction.   Right Ventricle: Normal right ventricular cavity size. Wall thickness is normal. Systolic function is normal.   Left Atrium: Left atrium is severely dilated.   Aortic Valve: There is a tilting disc prosthetic valve in the aortic position.   Mitral Valve: Moderately calcified leaflets. Moderately restricted motion. There is mild to moderate stenosis. MVA 2.6 cm2 by planimetry, 1.6 cm2 by PHT.  The mean pressure gradient across the mitral valve is 11 mmHg at a heart rate of  bpm. There is mild regurgitation.   Tricuspid Valve: There is mild to moderate regurgitation.   Pulmonary Artery: There is pulmonary hypertension. The estimated pulmonary artery systolic pressure is 70 mmHg.   IVC/SVC: Normal venous pressure at 3 mmHg.       EKG    MICROBIOLOGY    MDM     Amount and/or Complexity of Data Reviewed  Clinical lab tests: reviewed  Tests in the radiology  section of CPT®: reviewed  Tests in the medicine section of CPT®: reviewed  Discussion of test results with the performing providers: yes  Decide to obtain previous medical records or to obtain history from someone other than the patient: yes  Obtain history from someone other than the patient: yes  Review and summarize past medical records: yes  Discuss the patient with other providers: yes  Independent visualization of images, tracings, or specimens: yes

## 2024-05-20 NOTE — ASSESSMENT & PLAN NOTE
54 y.o. male pt with PAF, ASD closure at age 8 ([facility]), HFpEF, s/p AVR with a 22 mm mechanical valve and TV annuloplasty with a 28 mm ring 3/17, HTN, PHTN, Mitral stenosis, SVT, EP performed RFA (3/13/17) and ESRD  on HD 2016 referred for Cv clearance- urologic procedure. Patient denies CP, angina or anginal equivalent.Last EKG within normal no changes.    Pt stable without signs of CHF, on lovenox bridge, Pt cleared for procedure/surgery at moderate CV risk

## 2024-05-20 NOTE — ASSESSMENT & PLAN NOTE
"Patient is identified as having Diastolic (HFpEF) heart failure that is Chronic. CHF is currently controlled. Latest ECHO performed and demonstrates- Results for orders placed during the hospital encounter of 05/02/24    Echo    Interpretation Summary    Left Ventricle: The left ventricle is normal in size. Normal wall thickness. There is concentric remodeling. Normal wall motion. Ejection fraction by visual approximation is 60%. Grade II diastolic dysfunction.    Right Ventricle: Normal right ventricular cavity size. Wall thickness is normal. Systolic function is normal.    Left Atrium: Left atrium is severely dilated.    Aortic Valve: There is a tilting disc prosthetic valve in the aortic position.    Mitral Valve: Moderately calcified leaflets. Moderately restricted motion. There is mild to moderate stenosis. MVA 2.6 cm2 by planimetry, 1.6 cm2 by PHT.  The mean pressure gradient across the mitral valve is 11 mmHg at a heart rate of  bpm. There is mild regurgitation.    Tricuspid Valve: There is mild to moderate regurgitation.    Pulmonary Artery: There is pulmonary hypertension. The estimated pulmonary artery systolic pressure is 70 mmHg.    IVC/SVC: Normal venous pressure at 3 mmHg.  . Continue Beta Blocker ACE/ARB and monitor clinical status closely. Monitor on telemetry. Patient is off CHF pathway.  Monitor strict Is&Os and daily weights.  Place on fluid restriction of 1.5 L. Continue to stress to patient importance of self efficacy and  on diet for CHF. Last BNP reviewed- and noted below No results for input(s): "BNP", "BNPTRIAGEBLO" in the last 168 hours..          "

## 2024-05-20 NOTE — CONSULTS
[facility] - Med Surg [facility]  Cardiology  Consult Note    Patient Name: Isidro White Jr.  MRN: [id number]  Admission Date: 5/19/2024  Hospital Length of Stay: 1 days  Code Status: Full Code   Attending Provider: Bonifacio Shirley MD   Consulting Provider: Shai Calderon MD  Primary Care Physician: Boston Nevarez MD  Principal Problem:Preoperative clearance    Patient information was obtained from patient and ER records.     Inpatient consult to Cardiology  Consult performed by: Shai Calderon MD  Consult ordered by: Nimesh Rosas NP        Subjective:     Chief Complaint:  preop Cv clearance     HPI:   54 y.o. male pt with PAF, ASD closure at age 8 ([facility]), HFpEF, s/p AVR with a 22 mm mechanical valve and TV annuloplasty with a 28 mm ring 3/17, HTN, PHTN, Mitral stenosis, SVT, EP performed RFA (3/13/17) and ESRD  on HD 2016 referred for Cv clearance. Patient denies CP, angina or anginal equivalent.Last EKG within normal no changes.    Past Medical History:   Diagnosis Date    Anemia     Aortic valve stenosis     s/p mechanical AVR    Atrial fibrillation     Atrial flutter     Cardiomyopathy     CHF (congestive heart failure)     Drug-induced erectile dysfunction     ESRD due to hypertension     HD M, W, F    ESRD on dialysis     GERD (gastroesophageal reflux disease)     Hepatitis B     Hyperlipidemia     Hypertension     Nightmares     Obesity     DANIEL (obstructive sleep apnea) 11/12/2019    Secondary hyperparathyroidism of renal origin     Supraventricular tachycardia     atrial tachycardia    Tachycardia     Valvular regurgitation     AI, TR       Past Surgical History:   Procedure Laterality Date    ASD REPAIR      age 8 [facility]    AV Graft Creation Left 03/2017    CARDIAC CATHETERIZATION      [facility]     CARDIAC VALVE SURGERY  02/08/2017    22 mm Medtronic AV, 28 mm TV Medtronic annuloplaty ring    COLONOSCOPY N/A 8/27/2019    Procedure: COLONOSCOPY;  Surgeon: Addie MONREAL  MD Dora;  Location: [facility] ENDO;  Service: Endoscopy;  Laterality: N/A;  dialysis pt *needs K*    COLONOSCOPY N/A 9/3/2020    Procedure: COLONOSCOPY-need INR;  Surgeon: Jemma Youngblood MD;  Location: [facility] ENDO;  Service: Endoscopy;  Laterality: N/A;    ESOPHAGOGASTRODUODENOSCOPY N/A 8/27/2019    Procedure: EGD (ESOPHAGOGASTRODUODENOSCOPY);  Surgeon: Addie John MD;  Location: [facility] ENDO;  Service: Endoscopy;  Laterality: N/A;    RADIOFREQUENCY ABLATION  03/13/2017    Atrial tachycardia    REMOVAL OF GRAFT Left 7/2/2020    Procedure: REMOVAL, GRAFT;  Surgeon: Sascha Sidhu MD;  Location: [facility] OR;  Service: Peripheral Vascular;  Laterality: Left;    RIGHT HEART CATHETERIZATION Right 8/12/2021    Procedure: INSERTION, CATHETER, RIGHT HEART;  Surgeon: Mariah Pierce MD;  Location: [facility] CATH LAB;  Service: Cardiology;  Laterality: Right;       Review of patient's allergies indicates:  No Known Allergies    Current Facility-Administered Medications on File Prior to Encounter   Medication    0.9%  NaCl infusion    sodium chloride 0.9% flush 10 mL     Current Outpatient Medications on File Prior to Encounter   Medication Sig    albuterol (VENTOLIN HFA) 90 mcg/actuation inhaler Inhale 2 puffs into the lungs every 6 (six) hours as needed for Wheezing or Shortness of Breath. Rescue (Patient not taking: Reported on 1/25/2024)    amLODIPine (NORVASC) 10 MG tablet Take 1 tablet by mouth daily    ascorbic acid, vitamin C, (VITAMIN C) 500 MG tablet Take 1 tablet (500 mg total) by mouth 2 (two) times daily.    b complex vitamins tablet Take 1 tablet by mouth once daily.    B complex-vitamin C-folic acid (ETHAN-RACQUEL) 0.8 mg Tab Take 1 tablet by mouth once daily    calcium acetate,phosphat bind, (PHOSLO) 667 mg capsule Take 3 capsules by mouth 3 times daily with meals and 1 capsule by mouth twice daily with snacks    calcium acetate,phosphat bind, (PHOSLO) 667 mg tablet Take 3 capsules by mouth three times daily with meals AND  take 1 capsule by mouth twice daily with snacks    dialysis solutions (PERITONEAL DIALYSIS SOLUTION IP) Inject into the peritoneum every Mon, Wed, Fri. Lt FA Fistula, [facility] @ [facility] on M, W, F.    diltiaZEM (TIADYLT ER) 420 MG Cs24 Take 1 capsule (420 mg total) by mouth once daily.    epoetin vikki (PROCRIT) 10,000 unit/mL injection Inject 1 mL (10,000 Units total) into the skin every Mon, Wed, Fri. To be given with HD    ferrous sulfate 324 mg (65 mg iron) TbEC Take 325 mg by mouth once daily.    hydrALAZINE (APRESOLINE) 100 MG tablet Take 1 tablet by mouth three times a day including dialysis days    hydrOXYzine HCL (ATARAX) 25 MG tablet Take 1 tablet by mouth daily as needed for itching    hydrOXYzine HCL (ATARAX) 25 MG tablet take one tablet by mouth daily as needed for itching    labetaloL (NORMODYNE) 200 MG tablet Take 1 tablet (200 mg total) by mouth 3 (three) times daily.    lisinopriL (PRINIVIL,ZESTRIL) 40 MG tablet Take 1 tablet (40 mg total) by mouth once daily.    multivitamin with minerals tablet Take 1 tablet by mouth once daily.    omega-3 fatty acids-vitamin E (FISH OIL) 1,000 mg Cap Take 1 capsule by mouth once daily.    pantoprazole (PROTONIX) 40 MG tablet Take 1 tablet by mouth once daily    pep injection Inject 0.3 ml as directed     For compounding pharmacy use:   Add PAPAVERINE 30 mcg  Add PHENTOLAMINE 10 mg  Add ALPROSTADIL 100 mcg    rosuvastatin (CRESTOR) 10 MG tablet Take 1 tablet (10 mg total) by mouth once daily.    sildenafiL (VIAGRA) 100 MG tablet Take 1 tablet (100 mg total) by mouth daily as needed for Erectile Dysfunction.    tenofovir disoproxil fumarate (VIREAD) 300 mg Tab TAKE 1 TABLET BY MOUTH ONCE WEEKLY    tenofovir disoproxil fumarate (VIREAD) 300 mg Tab Take 1 tablet (300 mg total) by mouth once a week.    vitamin D (VITAMIN D3) 1000 units Tab Take 1 tablet by mouth daily    warfarin (COUMADIN) 7.5 MG tablet Take 2 tablets by mouth on Tuesday, Thursdays, and  Saturday; and 1 tablet on all other days of the week or as directed by coumadin clinic.    warfarin (COUMADIN) 7.5 MG tablet Take 1 tablet (7.5 mg) on Mondays, Wednesdays and Fridays; and 1 and 1/2 tablets (11.25 mg) all other days OR AS DIRECTED BY COUMADIN CLINIC     Family History       Problem Relation (Age of Onset)    Anesthesia problems Paternal Uncle    Cancer Mother    Diabetes Paternal Aunt, Paternal Aunt    Heart attack Father    Heart failure Paternal Grandmother    Hypertension Paternal Aunt    Leukemia Mother, Paternal Aunt    No Known Problems Sister, Brother, Sister, Sister    Stroke Paternal Aunt    Suicide Paternal Uncle    Valvular heart disease Maternal Aunt          Tobacco Use    Smoking status: Never    Smokeless tobacco: Never   Substance and Sexual Activity    Alcohol use: No     Comment: quit in 2016; does have heavy drinking history; started drinking at age 13; was drinking a 12 pack over the weekend and two 24 ounces of beer a day during the week along with a pint of hard alcohol    Drug use: No    Sexual activity: Not Currently     Review of Systems   Constitutional: Negative. Negative for weight gain.   HENT: Negative.     Eyes: Negative.    Cardiovascular: Negative.  Negative for chest pain, leg swelling and palpitations.   Respiratory: Negative.  Negative for shortness of breath.    Endocrine: Negative.    Hematologic/Lymphatic: Negative.    Skin: Negative.    Musculoskeletal:  Negative for muscle weakness.   Gastrointestinal: Negative.    Genitourinary: Negative.    Neurological: Negative.  Negative for dizziness.   Psychiatric/Behavioral: Negative.     Allergic/Immunologic: Negative.    All other systems reviewed and are negative.    Objective:     Vital Signs (Most Recent):  Temp: 98 °F (36.7 °C) (05/20/24 0729)  Pulse: 102 (05/20/24 0825)  Resp: 15 (05/20/24 0729)  BP: (Abnormal) 152/78 (05/20/24 0729)  SpO2: 96 % (05/20/24 0729) Vital Signs (24h Range):  Temp:  [97.3 °F (36.3  °C)-98 °F (36.7 °C)] 98 °F (36.7 °C)  Pulse:  [] 102  Resp:  [15-18] 15  SpO2:  [92 %-96 %] 96 %  BP: (152-158)/(74-78) 152/78     Weight: 104.5 kg (230 lb 6.1 oz)  Body mass index is 29.58 kg/m².    SpO2: 96 %       No intake or output data in the 24 hours ending 05/20/24 0844    Lines/Drains/Airways       Central Venous Catheter Line       Name Duration         Hemodialysis AV Graft 07/04/20 0701 Left upper arm 1416 days              Peripheral Intravenous Line       Name Duration         Peripheral IV - Single Lumen 05/19/24 2200 22 G Right Forearm <1 day                     Physical Exam  Vitals and nursing note reviewed.   Constitutional:       Appearance: He is well-developed.   HENT:      Head: Normocephalic and atraumatic.   Eyes:      Conjunctiva/sclera: Conjunctivae normal.      Pupils: Pupils are equal, round, and reactive to light.   Cardiovascular:      Rate and Rhythm: Normal rate and regular rhythm.      Pulses: Intact distal pulses.      Heart sounds: Normal heart sounds. Murmur: click.   Pulmonary:      Effort: Pulmonary effort is normal.      Breath sounds: Normal breath sounds.   Abdominal:      General: Bowel sounds are normal.      Palpations: Abdomen is soft.   Musculoskeletal:      Cervical back: Normal range of motion and neck supple.   Skin:     General: Skin is warm and dry.   Neurological:      Mental Status: He is alert and oriented to person, place, and time.          Significant Labs: All pertinent lab results from the last 24 hours have been reviewed.    Significant Imaging: X-Ray: CXR: X-Ray Chest 1 View (CXR): No results found for this visit on 05/19/24.  Assessment and Plan:     * Preoperative clearance  54 y.o. male pt with PAF, ASD closure at age 8 ([facility]), HFpEF, s/p AVR with a 22 mm mechanical valve and TV annuloplasty with a 28 mm ring 3/17, HTN, PHTN, Mitral stenosis, SVT, EP performed RFA (3/13/17) and ESRD  on HD 2016 referred for Cv clearance- urologic  procedure. Patient denies CP, angina or anginal equivalent.Last EKG within normal no changes.    Pt stable without signs of CHF, on lovenox bridge, Pt cleared for procedure/surgery at moderate CV risk         VTE Risk Mitigation (From admission, onward)           Ordered     enoxaparin injection 40 mg  Every 12 hours         05/19/24 2248     IP VTE HIGH RISK PATIENT  Once         05/19/24 2103     Place sequential compression device  Until discontinued         05/19/24 2103     Reason for No Pharmacological VTE Prophylaxis  Once        Question:  Reasons:  Answer:  Physician Provided (leave comment)  Comment:  pending surgical intervention    05/19/24 2103                    Thank you for your consult. I will sign off. Please contact us if you have any additional questions.    Shai Calderon MD  Cardiology   [facility] - [facility] S[facility] 3

## 2024-05-20 NOTE — ASSESSMENT & PLAN NOTE
Chronic, controlled. Latest blood pressure and vitals reviewed-     Temp:  [97.3 °F (36.3 °C)-98 °F (36.7 °C)]   Pulse:  []   Resp:  [15-20]   BP: (131-158)/(74-86)   SpO2:  [92 %-96 %] .   Home meds for hypertension were reviewed and noted below.   Hypertension Medications               amLODIPine (NORVASC) 10 MG tablet Take 1 tablet by mouth daily    diltiaZEM (TIADYLT ER) 420 MG Cs24 Take 1 capsule (420 mg total) by mouth once daily.    hydrALAZINE (APRESOLINE) 100 MG tablet Take 1 tablet by mouth three times a day including dialysis days    labetaloL (NORMODYNE) 200 MG tablet Take 1 tablet (200 mg total) by mouth 3 (three) times daily.    lisinopriL (PRINIVIL,ZESTRIL) 40 MG tablet Take 1 tablet (40 mg total) by mouth once daily.                 While in the hospital, will manage blood pressure as follows; Continue home antihypertensive regimen    Will utilize p.r.n. blood pressure medication only if patient's blood pressure greater than 160/100 and he develops symptoms such as worsening chest pain or shortness of breath.

## 2024-05-20 NOTE — ASSESSMENT & PLAN NOTE
Plan:  -continue anticoagulation therpay with  Lovenox while in hospital  -tele monitoring  -cards consulted for surgical clearance

## 2024-05-20 NOTE — ASSESSMENT & PLAN NOTE
Patient is chronically on statin.will continue for now. Last Lipid Panel:   Lab Results   Component Value Date    CHOL 248 (H) 03/12/2024    HDL 29 (L) 03/12/2024    LDLCALC 170.0 (H) 03/12/2024    TRIG 245 (H) 03/12/2024    CHOLHDL 11.7 (L) 03/12/2024     Plan:  -Continue home medication  -low fat/low calorie diet

## 2024-05-20 NOTE — SUBJECTIVE & OBJECTIVE
Interval History:  Patient reports feeling short of breath and tight due to not having dialysis at time of visit.  Dialysis as scheduled and should begin within 45 minutes according to the dialysis unit.    Review of Systems   Respiratory:  Positive for chest tightness.    Cardiovascular:  Negative for palpitations.   Gastrointestinal:  Positive for abdominal distention.   All other systems reviewed and are negative.    Objective:     Vital Signs (Most Recent):  Temp: 97.9 °F (36.6 °C) (05/20/24 1150)  Pulse: 78 (05/20/24 1150)  Resp: 18 (05/20/24 1150)  BP: (!) 144/75 (05/20/24 1150)  SpO2: (!) 93 % (05/20/24 1150) Vital Signs (24h Range):  Temp:  [97.3 °F (36.3 °C)-98 °F (36.7 °C)] 97.9 °F (36.6 °C)  Pulse:  [] 78  Resp:  [15-18] 18  SpO2:  [92 %-96 %] 93 %  BP: (144-158)/(74-78) 144/75     Weight: 104.5 kg (230 lb 6.1 oz)  Body mass index is 29.58 kg/m².  No intake or output data in the 24 hours ending 05/20/24 1756      Physical Exam  Vitals reviewed.   Constitutional:       Appearance: Normal appearance.   HENT:      Head: Normocephalic and atraumatic.      Mouth/Throat:      Mouth: Mucous membranes are moist.      Pharynx: Oropharynx is clear.   Eyes:      Extraocular Movements: Extraocular movements intact.      Conjunctiva/sclera: Conjunctivae normal.   Cardiovascular:      Rate and Rhythm: Normal rate and regular rhythm.      Pulses: Normal pulses.      Heart sounds: Normal heart sounds.      Comments: AV fistula with good thrill and bruit  Pulmonary:      Effort: Pulmonary effort is normal.      Breath sounds: Normal breath sounds.   Abdominal:      General: Bowel sounds are normal.      Palpations: Abdomen is soft.   Musculoskeletal:         General: Normal range of motion.      Cervical back: Normal range of motion and neck supple.   Skin:     General: Skin is warm and dry.   Neurological:      General: No focal deficit present.      Mental Status: He is alert and oriented to person, place, and  time. Mental status is at baseline.   Psychiatric:         Mood and Affect: Mood normal.         Behavior: Behavior normal.         Thought Content: Thought content normal.             Significant Labs: All pertinent labs within the past 24 hours have been reviewed.  CBC:   Recent Labs   Lab 05/20/24  0417   WBC 6.73   HGB 10.0*   HCT 30.7*   *     CMP:   Recent Labs   Lab 05/20/24 0417      K 4.4      CO2 24   GLU 85   BUN 69*   CREATININE 16.1*   CALCIUM 10.0   PROT 7.3   ALBUMIN 3.2*   BILITOT 0.6   ALKPHOS 58   AST 8*   ALT 7*   ANIONGAP 18*     Coagulation:   Recent Labs   Lab 05/20/24  0725   INR 1.6*       Significant Imaging: I have reviewed all pertinent imaging results/findings within the past 24 hours.

## 2024-05-20 NOTE — ASSESSMENT & PLAN NOTE
Admitted for heparin bridge from coumadin for scheduled penile prosthesis surgery to be performed by Dr. Shirley.  RCRI revealed class 4 risk with 15.0% 30 day risk of death, mi, cardiac arrest.  Plan:  -pre-op labs and imaging  -lovenox bridge for coumadin  -manage co-morbidities   -cards consult for surgical clearance  -urology primary team

## 2024-05-20 NOTE — PLAN OF CARE
Problem: Adult Inpatient Plan of Care  Goal: Plan of Care Review  Outcome: Progressing  Goal: Patient-Specific Goal (Individualized)  Outcome: Progressing  Goal: Absence of Hospital-Acquired Illness or Injury  Outcome: Progressing  Goal: Optimal Comfort and Wellbeing  Outcome: Progressing  Goal: Readiness for Transition of Care  Outcome: Progressing     Problem: Fall Injury Risk  Goal: Absence of Fall and Fall-Related Injury  Outcome: Progressing     Problem: Hemodialysis  Goal: Safe, Effective Therapy Delivery  Outcome: Progressing  Goal: Effective Tissue Perfusion  Outcome: Progressing  Goal: Absence of Infection Signs and Symptoms  Outcome: Progressing

## 2024-05-20 NOTE — ASSESSMENT & PLAN NOTE
Chronic, controlled. Latest blood pressure and vitals reviewed-     Temp:  [97.3 °F (36.3 °C)-97.8 °F (36.6 °C)]   Pulse:  [75-76]   Resp:  [18]   BP: (156-158)/(74-76)   SpO2:  [92 %-95 %] .   Home meds for hypertension were reviewed and noted below.   Hypertension Medications               amLODIPine (NORVASC) 10 MG tablet Take 1 tablet by mouth daily    diltiaZEM (TIADYLT ER) 420 MG Cs24 Take 1 capsule (420 mg total) by mouth once daily.    hydrALAZINE (APRESOLINE) 100 MG tablet Take 1 tablet by mouth three times a day including dialysis days    labetaloL (NORMODYNE) 200 MG tablet Take 1 tablet (200 mg total) by mouth 3 (three) times daily.    lisinopriL (PRINIVIL,ZESTRIL) 40 MG tablet Take 1 tablet (40 mg total) by mouth once daily.    pep injection Inject 0.3 ml as directed     For compounding pharmacy use:   Add PAPAVERINE 30 mcg  Add PHENTOLAMINE 10 mg  Add ALPROSTADIL 100 mcg            While in the hospital, will manage blood pressure as follows; Continue home antihypertensive regimen    Will utilize p.r.n. blood pressure medication only if patient's blood pressure greater than 160/100 and he develops symptoms such as worsening chest pain or shortness of breath.

## 2024-05-20 NOTE — HPI
Isidro White Jr. is a 54 y.o. 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  Presented as a direct admit from Dr. Shirley with urology for scheduled penile prosthesis procedure.  Hospital Medicine consulted to manage medical problems and to bridge Lovenox and Coumadin for procedure.  Patient denies any complaints at this time.    PCP: Boston Nevarez

## 2024-05-20 NOTE — CONSULTS
Nephrology Consultation  Consulting Physician:  Dr Shirley  Reason for consultation:  End-stage renal disease  Informants:  Patient, chart     History of Present Illness   The patient is a 54 y.o. male with a hx of end-stage renal disease presented to the ER directly admitted from Urology office for a scheduled penile prosthesis procedure.  Hospital Medicine was consulted to manage medical problems and to bridge with Lovenox and Coumadin for procedure.    His last dialysis outpatient was on Friday 05/17/2024.    Nephrology consulted to provide routine dialysis while here.      Current: Denies shortness of breath, nausea, vomiting.  He does report to me that he is hepatitis B positive and usually dialyzes in an isolation room outpatient.     Dialysis orders were called in prior to being seen by me and therefore he ended up getting dialysis in the many unit.  Dialysis staff did not have evidence of hepatitis-B status.      PMHx:    Past Medical History:   Diagnosis Date    Anemia     Aortic valve stenosis     s/p mechanical AVR    Atrial fibrillation     Atrial flutter     Cardiomyopathy     CHF (congestive heart failure)     Drug-induced erectile dysfunction     ESRD due to hypertension     HD M, W, F    ESRD on dialysis     GERD (gastroesophageal reflux disease)     Hepatitis B     Hyperlipidemia     Hypertension     Nightmares     Obesity     DANIEL (obstructive sleep apnea) 11/12/2019    Secondary hyperparathyroidism of renal origin     Supraventricular tachycardia     atrial tachycardia    Tachycardia     Valvular regurgitation     AI, TR         PSHx:  Past Surgical History:   Procedure Laterality Date    ASD REPAIR      age 8 [facility]    AV Graft Creation Left 03/2017    CARDIAC CATHETERIZATION      [facility]     CARDIAC VALVE SURGERY  02/08/2017    22 mm Medtronic AV, 28 mm TV Medtronic annuloplaty ring    COLONOSCOPY N/A 8/27/2019    Procedure: COLONOSCOPY;  Surgeon: Addie John MD;  Location:  [facility] ENDO;  Service: Endoscopy;  Laterality: N/A;  dialysis pt *needs K*    COLONOSCOPY N/A 9/3/2020    Procedure: COLONOSCOPY-need INR;  Surgeon: Jemma Youngblood MD;  Location: [facility] ENDO;  Service: Endoscopy;  Laterality: N/A;    ESOPHAGOGASTRODUODENOSCOPY N/A 8/27/2019    Procedure: EGD (ESOPHAGOGASTRODUODENOSCOPY);  Surgeon: Addie John MD;  Location: [facility] ENDO;  Service: Endoscopy;  Laterality: N/A;    RADIOFREQUENCY ABLATION  03/13/2017    Atrial tachycardia    REMOVAL OF GRAFT Left 7/2/2020    Procedure: REMOVAL, GRAFT;  Surgeon: Sascha Sidhu MD;  Location: [facility] OR;  Service: Peripheral Vascular;  Laterality: Left;    RIGHT HEART CATHETERIZATION Right 8/12/2021    Procedure: INSERTION, CATHETER, RIGHT HEART;  Surgeon: Mariah Pierce MD;  Location: [facility] CATH LAB;  Service: Cardiology;  Laterality: Right;         SocHx:    Social History     Socioeconomic History    Marital status: Single   Tobacco Use    Smoking status: Never    Smokeless tobacco: Never   Substance and Sexual Activity    Alcohol use: No     Comment: quit in 2016; does have heavy drinking history; started drinking at age 13; was drinking a 12 pack over the weekend and two 24 ounces of beer a day during the week along with a pint of hard alcohol    Drug use: No    Sexual activity: Not Currently   Social History Narrative    Caregiver Nurse Marjan Swain; 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     Social Determinants of Health     Financial Resource Strain: Low Risk  (12/12/2023)    Overall Financial Resource Strain (CARDIA)     Difficulty of Paying Living Expenses: Not very hard   Food Insecurity: No Food Insecurity (5/20/2024)    Hunger Vital Sign     Worried About Running Out of Food in the Last Year: Never true     Ran Out of Food in the Last Year: Never true   Transportation Needs: No Transportation Needs (5/20/2024)    TRANSPORTATION NEEDS     Transportation : No   Physical Activity: Inactive (12/12/2023)    Exercise Vital Sign     Days of  Exercise per Week: 0 days     Minutes of Exercise per Session: 0 min   Stress: No Stress Concern Present (12/12/2023)    [employer] [place] [employer] - Occupational Stress Questionnaire     Feeling of Stress : Not at all   Housing Stability: Low Risk  (5/20/2024)    Housing Stability Vital Sign     Unable to Pay for Housing in the Last Year: No     Homeless in the Last Year: No         FamHx:    Family History   Problem Relation Name Age of Onset    Leukemia Mother      Cancer Mother      Heart attack Father          massive MI at age 67    No Known Problems Sister      No Known Problems Brother      No Known Problems Sister      No Known Problems Sister      Heart failure Paternal Grandmother      Diabetes Paternal Aunt      Hypertension Paternal Aunt      Leukemia Paternal Aunt          CLL    Suicide Paternal Uncle      Anesthesia problems Paternal Uncle      Diabetes Paternal Aunt      Stroke Paternal Aunt      Valvular heart disease Maternal Aunt      Kidney disease Neg Hx      Colon cancer Neg Hx           ROS:    Review of Systems   Constitutional:  Negative for fever.   Respiratory:  Negative for shortness of breath.    Cardiovascular:  Negative for chest pain and leg swelling.   Gastrointestinal:  Negative for abdominal pain, diarrhea and vomiting.   Genitourinary:  Negative for dysuria.   All other systems reviewed and are negative.       Allergies:    has No Known Allergies.    Current medications:   Scheduled Meds:   atorvastatin  40 mg Oral Daily    calcium acetate(phosphat bind)  2,001 mg Oral TID WM    diltiaZEM  420 mg Oral Daily    ferrous sulfate  1 tablet Oral Daily    hydrALAZINE  100 mg Oral Q8H    labetaloL  200 mg Oral TID    lisinopriL  40 mg Oral Daily    multivitamin  1 tablet Oral Daily    pantoprazole  40 mg Oral Daily     Continuous Infusions:  PRN Meds:.  Current Facility-Administered Medications:     acetaminophen, 650 mg, Rectal, Q6H PRN    acetaminophen, 650 mg, Oral, Q8H  "PRN    albuterol-ipratropium, 3 mL, Nebulization, Q6H PRN    aluminum-magnesium hydroxide-simethicone, 30 mL, Oral, QID PRN    dextrose 10%, 12.5 g, Intravenous, PRN    dextrose 10%, 25 g, Intravenous, PRN    glucagon (human recombinant), 1 mg, Intramuscular, PRN    glucose, 16 g, Oral, PRN    glucose, 24 g, Oral, PRN    HYDROcodone-acetaminophen, 1 tablet, Oral, Q6H PRN    melatonin, 6 mg, Oral, Nightly PRN    morphine, 2 mg, Intravenous, Q4H PRN    naloxone, 0.02 mg, Intravenous, PRN    ondansetron, 4 mg, Intravenous, Q8H PRN    promethazine, 25 mg, Oral, Q6H PRN    senna-docusate 8.6-50 mg, 1 tablet, Oral, BID PRN    sodium chloride 0.9%, 250 mL, Intravenous, PRN    sodium chloride 0.9%, 10 mL, Intravenous, Q12H PRN       Physical Examination    VS/Measurements   BP (!) 144/75 (BP Location: Right arm, Patient Position: Lying)   Pulse 78   Temp 97.9 °F (36.6 °C) (Oral)   Resp 18   Ht 6' 2" (1.88 m)   Wt 104.5 kg (230 lb 6.1 oz)   SpO2 (!) 93%   BMI 29.58 kg/m²     Physical Exam  Constitutional:       Appearance: Normal appearance.   HENT:      Head: Normocephalic and atraumatic.   Cardiovascular:      Rate and Rhythm: Normal rate and regular rhythm.      Pulses: Normal pulses.      Heart sounds: Normal heart sounds.   Pulmonary:      Effort: Pulmonary effort is normal. No respiratory distress.      Breath sounds: Normal breath sounds. No wheezing or rales.   Abdominal:      General: Abdomen is flat. Bowel sounds are normal.      Palpations: Abdomen is soft.      Tenderness: There is no abdominal tenderness.   Neurological:      Mental Status: He is alert.              Laboratory Results   Today's Lab Results :    Recent Results (from the past 24 hour(s))   Comprehensive Metabolic Panel (CMP)    Collection Time: 05/20/24  4:17 AM   Result Value Ref Range    Sodium 142 136 - 145 mmol/L    Potassium 4.4 3.5 - 5.1 mmol/L    Chloride 100 95 - 110 mmol/L    CO2 24 23 - 29 mmol/L    Glucose 85 70 - 110 mg/dL    " BUN 69 (H) 6 - 20 mg/dL    Creatinine 16.1 (H) 0.5 - 1.4 mg/dL    Calcium 10.0 8.7 - 10.5 mg/dL    Total Protein 7.3 6.0 - 8.4 g/dL    Albumin 3.2 (L) 3.5 - 5.2 g/dL    Total Bilirubin 0.6 0.1 - 1.0 mg/dL    Alkaline Phosphatase 58 55 - 135 U/L    AST 8 (L) 10 - 40 U/L    ALT 7 (L) 10 - 44 U/L    eGFR 3 (A) >60 mL/min/1.73 m^2    Anion Gap 18 (H) 8 - 16 mmol/L   CBC with Automated Differential    Collection Time: 05/20/24  4:17 AM   Result Value Ref Range    WBC 6.73 3.90 - 12.70 K/uL    RBC 3.10 (L) 4.60 - 6.20 M/uL    Hemoglobin 10.0 (L) 14.0 - 18.0 g/dL    Hematocrit 30.7 (L) 40.0 - 54.0 %    MCV 99 (H) 82 - 98 fL    MCH 32.3 (H) 27.0 - 31.0 pg    MCHC 32.6 32.0 - 36.0 g/dL    RDW 16.1 (H) 11.5 - 14.5 %    Platelets 125 (L) 150 - 450 K/uL    MPV 11.1 9.2 - 12.9 fL    Immature Granulocytes 0.1 0.0 - 0.5 %    Gran # (ANC) 4.2 1.8 - 7.7 K/uL    Immature Grans (Abs) 0.01 0.00 - 0.04 K/uL    Lymph # 1.3 1.0 - 4.8 K/uL    Mono # 0.8 0.3 - 1.0 K/uL    Eos # 0.3 0.0 - 0.5 K/uL    Baso # 0.01 0.00 - 0.20 K/uL    nRBC 0 0 /100 WBC    Gran % 63.1 38.0 - 73.0 %    Lymph % 19.8 18.0 - 48.0 %    Mono % 12.0 4.0 - 15.0 %    Eosinophil % 4.9 0.0 - 8.0 %    Basophil % 0.1 0.0 - 1.9 %    Differential Method Automated    Protime-INR    Collection Time: 05/20/24  7:25 AM   Result Value Ref Range    Prothrombin Time 17.4 (H) 9.0 - 12.5 sec    INR 1.6 (H) 0.8 - 1.2   EKG 12-lead    Collection Time: 05/20/24  8:59 AM   Result Value Ref Range    QRS Duration 132 ms    [facility] QTC Calculation 474 ms        Assessment and Plan   End-stage renal disease, Monday Wednesday Friday at [facility] [address]-  --he is on dialysis now.  He is in the mini unit.  He does have chronic hepatitis-B and should be done bedside in the future.  The patient adjacent to him in the dialysis unit is hepatitis-B immune.  Discussed with the staff and they will call their Biomed team to disinfect the machine as per protocol.  We will also call the team at the hospital to  disinfect the dialysis room.    Erectile dysfunction   -admitted for penile prosthesis surgery by Urology, medicine following for medical management     History of mechanical aortic valve replacement  -Lovenox in the hospital    Chronic diastolic heart failure  Atrial fibrillation  -last ejection fraction 60% with grade 2 diastolic dysfunction    Hypertension with chronic kidney disease   -controlled on current meds including diltiazem, hydralazine, labetalol, lisinopril    Anemia of chronic kidney disease   -hemoglobin good at 10.0, continue outpatient MARYBETH protocol.  Transfuse as needed for hemoglobin less than 7    Chronic hepatitis-B  -bedside dialysis for future treatments    Secondary hyperparathyroidism of renal origin  -check phosphorus and add/adjust binders as needed, currently on PhosLo      ________________________________________________  James Kelley

## 2024-05-20 NOTE — PLAN OF CARE
[facility] - Med Surg 3  Initial Discharge Assessment       Primary Care Provider: Boston Nevarez MD    Admission Diagnosis: Gross hematuria [R31.0]    Admission Date: 5/19/2024  Expected Discharge Date:     Transition of Care Barriers: (P) None    Payor: MEDICARE / Plan: MEDICARE PART A & B / Product Type: Government /     Extended Emergency Contact Information  Primary Emergency Contact: Maggie Hernandez  Mobile Phone: [phone]  Relation: Sister  Preferred language: English   needed? No  Secondary Emergency Contact: Ana Cao  Mobile Phone: [phone]  Relation: Friend  Preferred language: English   needed? No    Discharge Plan A: (P) Home  Discharge Plan B: (P) Home      [facility]  [id number] [place] [address] [address] [place] [place] [address]  Phone: [phone] Fax: [phone]    [id number]  [place] - [address] AT [facility]  [address]  [place] [address]  Phone: [phone] Fax: [phone]      Initial Assessment (most recent)       Adult Discharge Assessment - 05/20/24 1214          Discharge Assessment    Assessment Type Discharge Planning Assessment (P)      Confirmed/corrected address, phone number and insurance Yes (P)      Confirmed Demographics Correct on Facesheet (P)      Source of Information patient (P)      When was your last doctors appointment? 03/12/24 (P)      Communicated SARBJIT with patient/caregiver Yes (P)      Reason For Admission Insertion of Penile implant (P)      People in Home alone (P)      Facility Arrived From: home (P)      Do you expect to return to your current living situation? Yes (P)      Do you have help at home or someone to help you manage your care at home? No (P)      Prior to hospitilization cognitive status: Alert/Oriented (P)      Current cognitive status: Alert/Oriented (P)      Walking or Climbing Stairs Difficulty no (P)      Dressing/Bathing Difficulty no (P)       Equipment Currently Used at Home none (P)      Readmission within 30 days? No (P)      Patient currently being followed by outpatient case management? No (P)      Do you currently have service(s) that help you manage your care at home? No (P)      Do you take prescription medications? Yes (P)      Do you have prescription coverage? Yes (P)      Coverage MCR (P)      Do you have any problems affording any of your prescribed medications? No (P)      Who is going to help you get home at discharge? unsure (P)      How do you get to doctors appointments? car, drives self (P)      Are you on dialysis? Yes (P)      Dialysis Name and Scheduled days [facility] MWF (P)      Do you take coumadin? Yes (P)      Who monitors your labs? [facility] (P)      Discharge Plan A Home (P)      Discharge Plan B Home (P)      DME Needed Upon Discharge  none (P)      Discharge Plan discussed with: Patient (P)      Transition of Care Barriers None (P)         Housing Stability    In the last 12 months, was there a time when you were not able to pay the mortgage or rent on time? No (P)      At any time in the past 12 months, were you homeless or living in a shelter (including now)? No (P)         Transportation Needs    Has the lack of transportation kept you from medical appointments, meetings, work or from getting things needed for daily living? No (P)         Food Insecurity    Within the past 12 months, you worried that your food would run out before you got the money to buy more. Never true (P)      Within the past 12 months, the food you bought just didn't last and you didn't have money to get more. Never true (P)                    Patient lives alone and is independent with ADL's.  No discharge needs.

## 2024-05-20 NOTE — HOSPITAL COURSE
Patient is a 51-year-old male with a history of end-stage renal disease, anemia, aortic valve stenosis, AFib flutter, CHF, GERD, chronic hep B, hypertension, DANIEL, secondary hyperparathyroidism who presented as a direct admit from the urology service for scheduled penile prosthesis.  Patient was on Coumadin due to his mechanical valve and therefore he is undergoing Lovenox Coumadin bridge for procedure.    Underwent procedure 5/21 without complications.  Evening of surgery patient began to have bleeding from the insertion site of the GERARD drain.  Urology stopped the bleeding at bedside and redressed it.  He continued to have blood draining approximately 300 cc with clots past.  A drain was subsequently removed and the site was closed with a suture per Urology.  Patient reports feeling bad on morning of 5/22.  Blood pressure was down.  He had episode of vomiting.  Labs returned with a 3 point drop in hemoglobin and potassium 7.2.  Patient was initiated on hemodialysis received 1000 cc of saline and 25 g of albumin.  Patient's blood pressure stabilized about 90.  However he went into AFib with RVR.  He was given a small dose of metoprolol and heart rate was not controlled.  Dialysis was discontinued after 2 hours.  And patient was transferred to the ICU for closer monitoring.  In ICU patient received 2 units of packed blood cells.  Repeat labs revealed a potassium before 0.7 hemoglobin of 7.3.  Antihypertensives were held with the exception of restarting Cardizem 30 mg q.6 hours.    Postop day 2. Patient remained stable, with stable vital signs and blood pressure still marginal.  He was continued on the Cardizem 30 mg q.6 hours and remained in sinus rhythm.  No further bleeding.  Patient refused blood draws this a.m..    Patient is stable overnight however this morning his labs returned with a INR 3.1.  White blood cell count of 10.75 hemoglobin 6.8 platelet count of 129 electrolytes stable.  2 units packed blood cells  typed and matched stat and transfused on dialysis.  During dialysis patient had episodes of AFib he was given IV Cardizem with some improvement in his heart rate down to the 1 teens.  Patient extensively counseled on need for at least daily blood work to be able to manage his Coumadin as well as his hemoglobin.  Extensively discussed with Urology.    Patient transitioned out of ICU.  No further bleeding noted.  His blood pressure still remains marginal likely secondary to euvolemia while in the hospital.  Long discussion with patient about medications.  Due to relative hypotension patient's Cardizem  has been changed to 60 mg q.6 hours.  Discussed with Dr. Shirley again.  Prosthesis in good location with no compromise and no evidence of bleeding.  We have decided to check patient's hemoglobin again in a.m. and if stable then likely we will discharge.  Patient has refused Coumadin due to his INR being above 3.  He reports that his therapeutic level is 2-3 although with a mechanical valve in AFib 2.5-3.5 likely should be his level.  He agrees to take 5 mg today.  We discussed risk of thrombosis valve.

## 2024-05-20 NOTE — HPI
54 y.o. male pt with PAF, ASD closure at age 8 ([facility]), HFpEF, s/p AVR with a 22 mm mechanical valve and TV annuloplasty with a 28 mm ring 3/17, HTN, PHTN, Mitral stenosis, SVT, EP performed RFA (3/13/17) and ESRD  on HD 2016 referred for Cv clearance. Patient denies CP, angina or anginal equivalent.Last EKG within normal no changes.

## 2024-05-20 NOTE — ANESTHESIA PREPROCEDURE EVALUATION
05/20/2024  Isidro White Jr. is a 54 y.o., male with PAF, ASD closure at age 8 ([facility]), HFpEF, s/p AVR with a 22 mm mechanical valve and TV annuloplasty with a 28 mm ring 3/17, HTN, PHTN, Mitral stenosis, SVT, EP performed RFA (3/13/17) and ESRD on HD 2016. Patient denies CP, angina or anginal equivalent. Last EKG within normal no changes    Patient Active Problem List   Diagnosis    Essential hypertension    Hypertensive cardiomegaly with heart failure    Pulmonary HTN    Adult congenital heart disease    History of mechanical aortic valve replacement    Postprocedural hypotension    ESRD (end stage renal disease) on dialysis    Severe protein-calorie malnutrition    Chronic diastolic heart failure    Atrial tachycardia    History of radiofrequency ablation for complex right atrial arrhythmia    Erectile dysfunction    Chronic pulmonary heart disease    Secondary hyperparathyroidism of renal origin    Atrial fibrillation    On Coumadin for atrial fibrillation    Encounter for colorectal cancer screening    Colon cancer screening    Special screening for malignant neoplasms, colon    Abnormal diffusion capacity determined by pulmonary function test    Mixed restrictive and obstructive lung disease    DANIEL (obstructive sleep apnea)    Nocturnal hypoxemia due to obstructive chronic bronchitis    Pulmonary nodule, left    Psychophysiological insomnia    Primary snoring    Periodic limb movement disorder (PLMD)    Inadequate sleep hygiene    Arteriovenous graft infection    NSVT (nonsustained ventricular tachycardia)    Bleeding from dialysis shunt    Disorder of electrolytes    Anemia associated with chronic renal failure    Hepatic cirrhosis    Gross hematuria    Renal cyst    Aortic atherosclerosis    Chronic hepatitis B    Low vitamin D level    Hypertriglyceridemia    History of colon polyps     Thrombocytopenia    Preoperative clearance    GERD (gastroesophageal reflux disease)     Past Medical History:   Diagnosis Date    Anemia     Aortic valve stenosis     s/p mechanical AVR    Atrial fibrillation     Atrial flutter     Cardiomyopathy     CHF (congestive heart failure)     Drug-induced erectile dysfunction     ESRD due to hypertension     HD M, W, F    ESRD on dialysis     GERD (gastroesophageal reflux disease)     Hepatitis B     Hyperlipidemia     Hypertension     Nightmares     Obesity     DANIEL (obstructive sleep apnea) 11/12/2019    Secondary hyperparathyroidism of renal origin     Supraventricular tachycardia     atrial tachycardia    Tachycardia     Valvular regurgitation     AI, TR     Past Surgical History:   Procedure Laterality Date    ASD REPAIR      age 8 [facility]    AV Graft Creation Left 03/2017    CARDIAC CATHETERIZATION      [facility]     CARDIAC VALVE SURGERY  02/08/2017    22 mm Medtronic AV, 28 mm TV Medtronic annuloplaty ring    COLONOSCOPY N/A 8/27/2019    Procedure: COLONOSCOPY;  Surgeon: Addie John MD;  Location: [facility] ENDO;  Service: Endoscopy;  Laterality: N/A;  dialysis pt *needs K*    COLONOSCOPY N/A 9/3/2020    Procedure: COLONOSCOPY-need INR;  Surgeon: Jemma Youngblood MD;  Location: [facility] ENDO;  Service: Endoscopy;  Laterality: N/A;    ESOPHAGOGASTRODUODENOSCOPY N/A 8/27/2019    Procedure: EGD (ESOPHAGOGASTRODUODENOSCOPY);  Surgeon: Addie John MD;  Location: [facility];  Service: Endoscopy;  Laterality: N/A;    RADIOFREQUENCY ABLATION  03/13/2017    Atrial tachycardia    REMOVAL OF GRAFT Left 7/2/2020    Procedure: REMOVAL, GRAFT;  Surgeon: Sascha Sidhu MD;  Location: [facility] OR;  Service: Peripheral Vascular;  Laterality: Left;    RIGHT HEART CATHETERIZATION Right 8/12/2021    Procedure: INSERTION, CATHETER, RIGHT HEART;  Surgeon: Mariah Pierce MD;  Location: [facility] CATH LAB;  Service: Cardiology;  Laterality: Right;       Chemistry        Component Value  Date/Time     05/20/2024 0417    K 4.4 05/20/2024 0417     05/20/2024 0417    CO2 24 05/20/2024 0417    BUN 69 (H) 05/20/2024 0417    CREATININE 16.1 (H) 05/20/2024 0417    GLU 85 05/20/2024 0417        Component Value Date/Time    CALCIUM 10.0 05/20/2024 0417    ALKPHOS 58 05/20/2024 0417    AST 8 (L) 05/20/2024 0417    ALT 7 (L) 05/20/2024 0417    BILITOT 0.6 05/20/2024 0417    ESTGFRAFRICA 4.9 (A) 07/21/2022 0910    EGFRNONAA 4.3 (A) 07/21/2022 0910        Lab Results   Component Value Date    WBC 6.73 05/20/2024    HGB 10.0 (L) 05/20/2024    HCT 30.7 (L) 05/20/2024    MCV 99 (H) 05/20/2024     (L) 05/20/2024     ECHO: (4/9/24)    Summary         Left Ventricle: The left ventricle is normal in size. Normal wall thickness. There is concentric remodeling. Normal wall motion. Ejection fraction by visual approximation is 60%. Grade II diastolic dysfunction.    Right Ventricle: Normal right ventricular cavity size. Wall thickness is normal. Systolic function is normal.    Left Atrium: Left atrium is severely dilated.    Aortic Valve: There is a tilting disc prosthetic valve in the aortic position.    Mitral Valve: Moderately calcified leaflets. Moderately restricted motion. There is mild to moderate stenosis. MVA 2.6 cm2 by planimetry, 1.6 cm2 by PHT.  The mean pressure gradient across the mitral valve is 11 mmHg at a heart rate of  bpm. There is mild regurgitation.    Tricuspid Valve: There is mild to moderate regurgitation.    Pulmonary Artery: There is pulmonary hypertension. The estimated pulmonary artery systolic pressure is 70 mmHg.    IVC/SVC: Normal venous pressure at 3 mmHg.    Pre-op Assessment    I have reviewed the Patient Summary Reports.    I have reviewed the NPO Status.   I have reviewed the Medications.     Review of Systems  Anesthesia Hx:  No problems with previous Anesthesia   History of prior surgery of interest to airway management or planning:  Previous anesthesia: General           Denies Personal Hx of Anesthesia complications.                    Social:  Non-Smoker       Hematology/Oncology:    Oncology Normal    -- Anemia:               Hematology Comments: 10/30.7                        Cardiovascular:     Hypertension       CHF       ECG has been reviewed. Normal sinus rhythm   Nonspecific intraventricular block   Minimal voltage criteria for LVH, may be normal variant ( Yash product )   Abnormal QRS-T angle, consider primary T wave abnormality   Abnormal ECG   When compared with ECG of 25-JAN-2024 15:00,   The axis Shifted right                            Pulmonary:   COPD     Sleep Apnea Mixed obstructive/restrictive lung disease     DANIEL    Pulm HTN - PASP 70               Renal/:  Chronic Renal Disease, ESRD   HD - M/W/F                 Hepatic/GI:     GERD Liver Disease, Hepatitis, B Hepatic cirrhosis    Chronic hep-b          Musculoskeletal:  Musculoskeletal Normal                Neurological:  Neurology Normal                                      Endocrine:  Endocrine Normal    Bmi 30            Physical Exam  General: Well nourished, Cooperative, Alert and Oriented    Airway:  Mallampati: III   Mouth Opening: Normal  TM Distance: Normal  Tongue: Normal  Neck ROM: Normal ROM    Dental:  Intact  Patient denies any currently loose or chipped teeth; Patient denies any removable dental appliances        Anesthesia Plan  Type of Anesthesia, risks & benefits discussed:    Anesthesia Type: Gen ETT  Intra-op Monitoring Plan: Standard ASA Monitors and Art Line  Post Op Pain Control Plan: multimodal analgesia and IV/PO Opioids PRN  Induction:  IV  Airway Plan: Direct, Post-Induction  Informed Consent: Informed consent signed with the Patient and all parties understand the risks and agree with anesthesia plan.  All questions answered.   ASA Score: 4  Day of Surgery Review of History & Physical: H&P Update referred to the surgeon/provider.    Ready For Surgery From Anesthesia  Perspective.     .

## 2024-05-20 NOTE — ASSESSMENT & PLAN NOTE
Patient with Paroxysmal (<7 days) atrial fibrillation which is controlled currently with Beta Blocker and Calcium Channel Blocker. Patient is currently in sinus rhythm.[id number] Score: The patient doesn't have any registry metric data available. HASBLED Score: . Anticoagulation indicated. Anticoagulation done with coumadin. Bridging with lovenox for  penile prosthesis surgery .

## 2024-05-20 NOTE — ASSESSMENT & PLAN NOTE
Nephrology consult for HD while in hospital.  Usual chronic maintenance hemodialysis Monday Wednesday Friday [facility]

## 2024-05-20 NOTE — SUBJECTIVE & OBJECTIVE
Past Medical History:   Diagnosis Date    Anemia     Aortic valve stenosis     s/p mechanical AVR    Atrial fibrillation     Atrial flutter     Cardiomyopathy     CHF (congestive heart failure)     Drug-induced erectile dysfunction     ESRD due to hypertension     HD M, W, F    ESRD on dialysis     GERD (gastroesophageal reflux disease)     Hepatitis B     Hyperlipidemia     Hypertension     Nightmares     Obesity     DANIEL (obstructive sleep apnea) 11/12/2019    Secondary hyperparathyroidism of renal origin     Supraventricular tachycardia     atrial tachycardia    Tachycardia     Valvular regurgitation     AI, TR       Past Surgical History:   Procedure Laterality Date    ASD REPAIR      age 8 [facility]    AV Graft Creation Left 03/2017    CARDIAC CATHETERIZATION      [facility]     CARDIAC VALVE SURGERY  02/08/2017    22 mm Medtronic AV, 28 mm TV Medtronic annuloplaty ring    COLONOSCOPY N/A 8/27/2019    Procedure: COLONOSCOPY;  Surgeon: Addie John MD;  Location: [facility] ENDO;  Service: Endoscopy;  Laterality: N/A;  dialysis pt *needs K*    COLONOSCOPY N/A 9/3/2020    Procedure: COLONOSCOPY-need INR;  Surgeon: Jemma Youngblood MD;  Location: [facility] ENDO;  Service: Endoscopy;  Laterality: N/A;    ESOPHAGOGASTRODUODENOSCOPY N/A 8/27/2019    Procedure: EGD (ESOPHAGOGASTRODUODENOSCOPY);  Surgeon: Addie John MD;  Location: [facility] ENDO;  Service: Endoscopy;  Laterality: N/A;    RADIOFREQUENCY ABLATION  03/13/2017    Atrial tachycardia    REMOVAL OF GRAFT Left 7/2/2020    Procedure: REMOVAL, GRAFT;  Surgeon: Sascha Sidhu MD;  Location: [facility] OR;  Service: Peripheral Vascular;  Laterality: Left;    RIGHT HEART CATHETERIZATION Right 8/12/2021    Procedure: INSERTION, CATHETER, RIGHT HEART;  Surgeon: Mariah Pierce MD;  Location: [facility] CATH LAB;  Service: Cardiology;  Laterality: Right;       Review of patient's allergies indicates:  No Known Allergies    Current Facility-Administered Medications on File Prior to  Encounter   Medication    0.9%  NaCl infusion    sodium chloride 0.9% flush 10 mL     Current Outpatient Medications on File Prior to Encounter   Medication Sig    albuterol (VENTOLIN HFA) 90 mcg/actuation inhaler Inhale 2 puffs into the lungs every 6 (six) hours as needed for Wheezing or Shortness of Breath. Rescue (Patient not taking: Reported on 1/25/2024)    amLODIPine (NORVASC) 10 MG tablet Take 1 tablet by mouth daily    ascorbic acid, vitamin C, (VITAMIN C) 500 MG tablet Take 1 tablet (500 mg total) by mouth 2 (two) times daily.    b complex vitamins tablet Take 1 tablet by mouth once daily.    B complex-vitamin C-folic acid (ETHAN-RACQUEL) 0.8 mg Tab Take 1 tablet by mouth once daily    calcium acetate,phosphat bind, (PHOSLO) 667 mg capsule Take 3 capsules by mouth 3 times daily with meals and 1 capsule by mouth twice daily with snacks    calcium acetate,phosphat bind, (PHOSLO) 667 mg tablet Take 3 capsules by mouth three times daily with meals AND take 1 capsule by mouth twice daily with snacks    dialysis solutions (PERITONEAL DIALYSIS SOLUTION IP) Inject into the peritoneum every Mon, Wed, Fri. Lt FA Fistula, [facility] [place] on M, W, F.    diltiaZEM (TIADYLT ER) 420 MG Cs24 Take 1 capsule (420 mg total) by mouth once daily.    epoetin vikki (PROCRIT) 10,000 unit/mL injection Inject 1 mL (10,000 Units total) into the skin every Mon, Wed, Fri. To be given with HD    ferrous sulfate 324 mg (65 mg iron) TbEC Take 325 mg by mouth once daily.    hydrALAZINE (APRESOLINE) 100 MG tablet Take 1 tablet by mouth three times a day including dialysis days    hydrOXYzine HCL (ATARAX) 25 MG tablet Take 1 tablet by mouth daily as needed for itching    hydrOXYzine HCL (ATARAX) 25 MG tablet take one tablet by mouth daily as needed for itching    labetaloL (NORMODYNE) 200 MG tablet Take 1 tablet (200 mg total) by mouth 3 (three) times daily.    lisinopriL (PRINIVIL,ZESTRIL) 40 MG tablet Take 1 tablet (40 mg total) by  mouth once daily.    multivitamin with minerals tablet Take 1 tablet by mouth once daily.    omega-3 fatty acids-vitamin E (FISH OIL) 1,000 mg Cap Take 1 capsule by mouth once daily.    pantoprazole (PROTONIX) 40 MG tablet Take 1 tablet by mouth once daily    pep injection Inject 0.3 ml as directed     For compounding pharmacy use:   Add PAPAVERINE 30 mcg  Add PHENTOLAMINE 10 mg  Add ALPROSTADIL 100 mcg    rosuvastatin (CRESTOR) 10 MG tablet Take 1 tablet (10 mg total) by mouth once daily.    sildenafiL (VIAGRA) 100 MG tablet Take 1 tablet (100 mg total) by mouth daily as needed for Erectile Dysfunction.    tenofovir disoproxil fumarate (VIREAD) 300 mg Tab TAKE 1 TABLET BY MOUTH ONCE WEEKLY    tenofovir disoproxil fumarate (VIREAD) 300 mg Tab Take 1 tablet (300 mg total) by mouth once a week.    vitamin D (VITAMIN D3) 1000 units Tab Take 1 tablet by mouth daily    warfarin (COUMADIN) 7.5 MG tablet Take 2 tablets by mouth on Tuesday, Thursdays, and Saturday; and 1 tablet on all other days of the week or as directed by coumadin clinic.    warfarin (COUMADIN) 7.5 MG tablet Take 1 tablet (7.5 mg) on Mondays, Wednesdays and Fridays; and 1 and 1/2 tablets (11.25 mg) all other days OR AS DIRECTED BY COUMADIN CLINIC     Family History       Problem Relation (Age of Onset)    Anesthesia problems Paternal Uncle    Cancer Mother    Diabetes Paternal Aunt, Paternal Aunt    Heart attack Father    Heart failure Paternal Grandmother    Hypertension Paternal Aunt    Leukemia Mother, Paternal Aunt    No Known Problems Sister, Brother, Sister, Sister    Stroke Paternal Aunt    Suicide Paternal Uncle    Valvular heart disease Maternal Aunt          Tobacco Use    Smoking status: Never    Smokeless tobacco: Never   Substance and Sexual Activity    Alcohol use: No     Comment: quit in 2016; does have heavy drinking history; started drinking at age 13; was drinking a 12 pack over the weekend and two 24 ounces of beer a day during the  week along with a pint of hard alcohol    Drug use: No    Sexual activity: Not Currently     Review of Systems   Constitutional: Negative. Negative for weight gain.   HENT: Negative.     Eyes: Negative.    Cardiovascular: Negative.  Negative for chest pain, leg swelling and palpitations.   Respiratory: Negative.  Negative for shortness of breath.    Endocrine: Negative.    Hematologic/Lymphatic: Negative.    Skin: Negative.    Musculoskeletal:  Negative for muscle weakness.   Gastrointestinal: Negative.    Genitourinary: Negative.    Neurological: Negative.  Negative for dizziness.   Psychiatric/Behavioral: Negative.     Allergic/Immunologic: Negative.    All other systems reviewed and are negative.    Objective:     Vital Signs (Most Recent):  Temp: 98 °F (36.7 °C) (05/20/24 0729)  Pulse: 102 (05/20/24 0825)  Resp: 15 (05/20/24 0729)  BP: (Abnormal) 152/78 (05/20/24 0729)  SpO2: 96 % (05/20/24 0729) Vital Signs (24h Range):  Temp:  [97.3 °F (36.3 °C)-98 °F (36.7 °C)] 98 °F (36.7 °C)  Pulse:  [] 102  Resp:  [15-18] 15  SpO2:  [92 %-96 %] 96 %  BP: (152-158)/(74-78) 152/78     Weight: 104.5 kg (230 lb 6.1 oz)  Body mass index is 29.58 kg/m².    SpO2: 96 %       No intake or output data in the 24 hours ending 05/20/24 0844    Lines/Drains/Airways       Central Venous Catheter Line       Name Duration         Hemodialysis AV Graft 07/04/20 0701 Left upper arm 1416 days              Peripheral Intravenous Line       Name Duration         Peripheral IV - Single Lumen 05/19/24 2200 22 G Right Forearm <1 day                     Physical Exam  Vitals and nursing note reviewed.   Constitutional:       Appearance: He is well-developed.   HENT:      Head: Normocephalic and atraumatic.   Eyes:      Conjunctiva/sclera: Conjunctivae normal.      Pupils: Pupils are equal, round, and reactive to light.   Cardiovascular:      Rate and Rhythm: Normal rate and regular rhythm.      Pulses: Intact distal pulses.      Heart sounds:  Normal heart sounds. Murmur: click.   Pulmonary:      Effort: Pulmonary effort is normal.      Breath sounds: Normal breath sounds.   Abdominal:      General: Bowel sounds are normal.      Palpations: Abdomen is soft.   Musculoskeletal:      Cervical back: Normal range of motion and neck supple.   Skin:     General: Skin is warm and dry.   Neurological:      Mental Status: He is alert and oriented to person, place, and time.          Significant Labs: All pertinent lab results from the last 24 hours have been reviewed.    Significant Imaging: X-Ray: CXR: X-Ray Chest 1 View (CXR): No results found for this visit on 05/19/24.

## 2024-05-21 ENCOUNTER — ANESTHESIA (OUTPATIENT)
Dept: SURGERY | Facility: HOSPITAL | Age: 54
DRG: 709 | End: 2024-05-21
Payer: MEDICARE

## 2024-05-21 ENCOUNTER — TELEPHONE (OUTPATIENT)
Dept: UROLOGY | Facility: CLINIC | Age: 54
End: 2024-05-21
Payer: MEDICARE

## 2024-05-21 LAB
ALBUMIN SERPL BCP-MCNC: 3.5 G/DL (ref 3.5–5.2)
ALP SERPL-CCNC: 69 U/L (ref 55–135)
ALT SERPL W/O P-5'-P-CCNC: 8 U/L (ref 10–44)
ANION GAP SERPL CALC-SCNC: 15 MMOL/L (ref 8–16)
AST SERPL-CCNC: 9 U/L (ref 10–40)
BASOPHILS # BLD AUTO: 0.02 K/UL (ref 0–0.2)
BASOPHILS NFR BLD: 0.3 % (ref 0–1.9)
BILIRUB SERPL-MCNC: 0.8 MG/DL (ref 0.1–1)
BUN SERPL-MCNC: 43 MG/DL (ref 6–20)
CALCIUM SERPL-MCNC: 10.2 MG/DL (ref 8.7–10.5)
CHLORIDE SERPL-SCNC: 102 MMOL/L (ref 95–110)
CO2 SERPL-SCNC: 20 MMOL/L (ref 23–29)
CREAT SERPL-MCNC: 12 MG/DL (ref 0.5–1.4)
DIFFERENTIAL METHOD BLD: ABNORMAL
EOSINOPHIL # BLD AUTO: 0.2 K/UL (ref 0–0.5)
EOSINOPHIL NFR BLD: 3.6 % (ref 0–8)
ERYTHROCYTE [DISTWIDTH] IN BLOOD BY AUTOMATED COUNT: 15.9 % (ref 11.5–14.5)
EST. GFR  (NO RACE VARIABLE): 5 ML/MIN/1.73 M^2
GLUCOSE SERPL-MCNC: 105 MG/DL (ref 70–110)
HAV IGM SERPL QL IA: ABNORMAL
HBV CORE IGM SERPL QL IA: ABNORMAL
HBV SURFACE AG SERPL QL IA: REACTIVE
HBV SURFACE AG SERPL QL NT: ABNORMAL
HCT VFR BLD AUTO: 35.3 % (ref 40–54)
HCV AB SERPL QL IA: ABNORMAL
HGB BLD-MCNC: 11.5 G/DL (ref 14–18)
IMM GRANULOCYTES # BLD AUTO: 0.01 K/UL (ref 0–0.04)
IMM GRANULOCYTES NFR BLD AUTO: 0.2 % (ref 0–0.5)
INR PPP: 1.4 (ref 0.8–1.2)
LYMPHOCYTES # BLD AUTO: 1.1 K/UL (ref 1–4.8)
LYMPHOCYTES NFR BLD: 19.3 % (ref 18–48)
MCH RBC QN AUTO: 31.9 PG (ref 27–31)
MCHC RBC AUTO-ENTMCNC: 32.6 G/DL (ref 32–36)
MCV RBC AUTO: 98 FL (ref 82–98)
MONOCYTES # BLD AUTO: 0.7 K/UL (ref 0.3–1)
MONOCYTES NFR BLD: 11.3 % (ref 4–15)
NEUTROPHILS # BLD AUTO: 3.8 K/UL (ref 1.8–7.7)
NEUTROPHILS NFR BLD: 65.3 % (ref 38–73)
NRBC BLD-RTO: 0 /100 WBC
PLATELET # BLD AUTO: 136 K/UL (ref 150–450)
PMV BLD AUTO: 11 FL (ref 9.2–12.9)
POTASSIUM SERPL-SCNC: 4.4 MMOL/L (ref 3.5–5.1)
PROT SERPL-MCNC: 8.4 G/DL (ref 6–8.4)
PROTHROMBIN TIME: 15.7 SEC (ref 9–12.5)
RBC # BLD AUTO: 3.6 M/UL (ref 4.6–6.2)
SODIUM SERPL-SCNC: 137 MMOL/L (ref 136–145)
WBC # BLD AUTO: 5.84 K/UL (ref 3.9–12.7)

## 2024-05-21 PROCEDURE — 63600175 PHARM REV CODE 636 W HCPCS: Performed by: STUDENT IN AN ORGANIZED HEALTH CARE EDUCATION/TRAINING PROGRAM

## 2024-05-21 PROCEDURE — 63600175 PHARM REV CODE 636 W HCPCS

## 2024-05-21 PROCEDURE — 25000003 PHARM REV CODE 250: Performed by: HOSPITALIST

## 2024-05-21 PROCEDURE — 11000001 HC ACUTE MED/SURG PRIVATE ROOM

## 2024-05-21 PROCEDURE — 36415 COLL VENOUS BLD VENIPUNCTURE: CPT | Performed by: UROLOGY

## 2024-05-21 PROCEDURE — 63600175 PHARM REV CODE 636 W HCPCS: Mod: JZ,JG | Performed by: HOSPITALIST

## 2024-05-21 PROCEDURE — 27201423 OPTIME MED/SURG SUP & DEVICES STERILE SUPPLY: Performed by: UROLOGY

## 2024-05-21 PROCEDURE — 54405 INSERT MULTI-COMP PENIS PROS: CPT | Mod: ,,, | Performed by: UROLOGY

## 2024-05-21 PROCEDURE — 25000003 PHARM REV CODE 250

## 2024-05-21 PROCEDURE — 71000039 HC RECOVERY, EACH ADD'L HOUR: Performed by: UROLOGY

## 2024-05-21 PROCEDURE — 71000033 HC RECOVERY, INTIAL HOUR: Performed by: UROLOGY

## 2024-05-21 PROCEDURE — 85610 PROTHROMBIN TIME: CPT | Performed by: UROLOGY

## 2024-05-21 PROCEDURE — 80053 COMPREHEN METABOLIC PANEL: CPT | Performed by: HOSPITALIST

## 2024-05-21 PROCEDURE — 25000003 PHARM REV CODE 250: Performed by: NURSE ANESTHETIST, CERTIFIED REGISTERED

## 2024-05-21 PROCEDURE — 25000003 PHARM REV CODE 250: Performed by: UROLOGY

## 2024-05-21 PROCEDURE — 85025 COMPLETE CBC W/AUTO DIFF WBC: CPT | Performed by: HOSPITALIST

## 2024-05-21 PROCEDURE — 0VUS0JZ SUPPLEMENT PENIS WITH SYNTHETIC SUBSTITUTE, OPEN APPROACH: ICD-10-PCS | Performed by: UROLOGY

## 2024-05-21 PROCEDURE — 25000003 PHARM REV CODE 250: Performed by: NURSE PRACTITIONER

## 2024-05-21 PROCEDURE — 99232 SBSQ HOSP IP/OBS MODERATE 35: CPT | Mod: ,,, | Performed by: INTERNAL MEDICINE

## 2024-05-21 PROCEDURE — 36000706: Performed by: UROLOGY

## 2024-05-21 PROCEDURE — 37000008 HC ANESTHESIA 1ST 15 MINUTES: Performed by: UROLOGY

## 2024-05-21 PROCEDURE — 36000707: Performed by: UROLOGY

## 2024-05-21 PROCEDURE — C1889 IMPLANT/INSERT DEVICE, NOC: HCPCS | Performed by: UROLOGY

## 2024-05-21 PROCEDURE — 25000003 PHARM REV CODE 250: Performed by: STUDENT IN AN ORGANIZED HEALTH CARE EDUCATION/TRAINING PROGRAM

## 2024-05-21 PROCEDURE — C1729 CATH, DRAINAGE: HCPCS | Performed by: UROLOGY

## 2024-05-21 PROCEDURE — 37000009 HC ANESTHESIA EA ADD 15 MINS: Performed by: UROLOGY

## 2024-05-21 DEVICE — AMS 700 PENILE PROSTHESIS, 1 LOW PROFILE RESERVOIR, UP TO 100 ML, INHIBIZONE TREATED
Type: IMPLANTABLE DEVICE | Site: PENIS | Status: FUNCTIONAL
Brand: CONCEAL

## 2024-05-21 DEVICE — INFLATABLE PENILE PROSTHESIS ACCESSORY KIT WITH MS PUMP
Type: IMPLANTABLE DEVICE | Site: PENIS | Status: FUNCTIONAL
Brand: AMS 700 ACCESSORY KIT

## 2024-05-21 DEVICE — IMPLANTABLE DEVICE: Type: IMPLANTABLE DEVICE | Site: PENIS | Status: FUNCTIONAL

## 2024-05-21 RX ORDER — HYDROMORPHONE HYDROCHLORIDE 2 MG/ML
0.2 INJECTION, SOLUTION INTRAMUSCULAR; INTRAVENOUS; SUBCUTANEOUS EVERY 5 MIN PRN
Status: DISCONTINUED | OUTPATIENT
Start: 2024-05-21 | End: 2024-05-21 | Stop reason: HOSPADM

## 2024-05-21 RX ORDER — LIDOCAINE HYDROCHLORIDE 20 MG/ML
INJECTION, SOLUTION EPIDURAL; INFILTRATION; INTRACAUDAL; PERINEURAL
Status: DISCONTINUED | OUTPATIENT
Start: 2024-05-21 | End: 2024-05-21

## 2024-05-21 RX ORDER — VASOPRESSIN 20 [USP'U]/ML
INJECTION, SOLUTION INTRAMUSCULAR; SUBCUTANEOUS
Status: DISCONTINUED | OUTPATIENT
Start: 2024-05-21 | End: 2024-05-21

## 2024-05-21 RX ORDER — ONDANSETRON HYDROCHLORIDE 2 MG/ML
4 INJECTION, SOLUTION INTRAVENOUS DAILY PRN
Status: DISCONTINUED | OUTPATIENT
Start: 2024-05-21 | End: 2024-05-21 | Stop reason: HOSPADM

## 2024-05-21 RX ORDER — LEVOFLOXACIN 750 MG/1
750 TABLET ORAL DAILY
Status: DISCONTINUED | OUTPATIENT
Start: 2024-05-21 | End: 2024-05-21

## 2024-05-21 RX ORDER — DEXAMETHASONE SODIUM PHOSPHATE 4 MG/ML
INJECTION, SOLUTION INTRA-ARTICULAR; INTRALESIONAL; INTRAMUSCULAR; INTRAVENOUS; SOFT TISSUE
Status: DISCONTINUED | OUTPATIENT
Start: 2024-05-21 | End: 2024-05-21

## 2024-05-21 RX ORDER — BACITRACIN ZINC 500 UNIT/G
OINTMENT (GRAM) TOPICAL
Status: DISCONTINUED | OUTPATIENT
Start: 2024-05-21 | End: 2024-05-21 | Stop reason: HOSPADM

## 2024-05-21 RX ORDER — LEVOFLOXACIN 750 MG/1
750 TABLET ORAL ONCE
Status: COMPLETED | OUTPATIENT
Start: 2024-05-21 | End: 2024-05-21

## 2024-05-21 RX ORDER — PHENYLEPHRINE HYDROCHLORIDE 10 MG/ML
INJECTION INTRAVENOUS
Status: DISCONTINUED | OUTPATIENT
Start: 2024-05-21 | End: 2024-05-21

## 2024-05-21 RX ORDER — WARFARIN 7.5 MG/1
7.5 TABLET ORAL DAILY
Status: DISCONTINUED | OUTPATIENT
Start: 2024-05-21 | End: 2024-05-21

## 2024-05-21 RX ORDER — PROPOFOL 10 MG/ML
VIAL (ML) INTRAVENOUS
Status: DISCONTINUED | OUTPATIENT
Start: 2024-05-21 | End: 2024-05-21

## 2024-05-21 RX ORDER — OXYCODONE AND ACETAMINOPHEN 5; 325 MG/1; MG/1
1 TABLET ORAL
Status: DISCONTINUED | OUTPATIENT
Start: 2024-05-21 | End: 2024-05-21 | Stop reason: HOSPADM

## 2024-05-21 RX ORDER — SODIUM CHLORIDE 9 MG/ML
INJECTION, SOLUTION INTRAVENOUS ONCE
OUTPATIENT
Start: 2024-05-21 | End: 2024-05-21

## 2024-05-21 RX ORDER — FENTANYL CITRATE 50 UG/ML
INJECTION, SOLUTION INTRAMUSCULAR; INTRAVENOUS
Status: DISCONTINUED | OUTPATIENT
Start: 2024-05-21 | End: 2024-05-21

## 2024-05-21 RX ORDER — ROCURONIUM BROMIDE 10 MG/ML
INJECTION, SOLUTION INTRAVENOUS
Status: DISCONTINUED | OUTPATIENT
Start: 2024-05-21 | End: 2024-05-21

## 2024-05-21 RX ORDER — WARFARIN 7.5 MG/1
7.5 TABLET ORAL
Status: DISCONTINUED | OUTPATIENT
Start: 2024-05-22 | End: 2024-05-22

## 2024-05-21 RX ORDER — FENTANYL CITRATE 50 UG/ML
25 INJECTION, SOLUTION INTRAMUSCULAR; INTRAVENOUS EVERY 5 MIN PRN
Status: DISCONTINUED | OUTPATIENT
Start: 2024-05-21 | End: 2024-05-21 | Stop reason: HOSPADM

## 2024-05-21 RX ORDER — MUPIROCIN 20 MG/G
OINTMENT TOPICAL 2 TIMES DAILY
Status: DISCONTINUED | OUTPATIENT
Start: 2024-05-21 | End: 2024-05-26 | Stop reason: HOSPADM

## 2024-05-21 RX ORDER — ONDANSETRON HYDROCHLORIDE 2 MG/ML
INJECTION, SOLUTION INTRAMUSCULAR; INTRAVENOUS
Status: DISCONTINUED | OUTPATIENT
Start: 2024-05-21 | End: 2024-05-21

## 2024-05-21 RX ORDER — SUCCINYLCHOLINE CHLORIDE 20 MG/ML
INJECTION INTRAMUSCULAR; INTRAVENOUS
Status: DISCONTINUED | OUTPATIENT
Start: 2024-05-21 | End: 2024-05-21

## 2024-05-21 RX ORDER — LEVOFLOXACIN 500 MG/1
500 TABLET, FILM COATED ORAL EVERY OTHER DAY
Status: COMPLETED | OUTPATIENT
Start: 2024-05-23 | End: 2024-05-25

## 2024-05-21 RX ORDER — GENTAMICIN SULFATE 80 MG/100ML
INJECTION, SOLUTION INTRAVENOUS
Status: DISCONTINUED | OUTPATIENT
Start: 2024-05-21 | End: 2024-05-21

## 2024-05-21 RX ADMIN — PHENYLEPHRINE HYDROCHLORIDE 100 MCG: 10 INJECTION INTRAVENOUS at 08:05

## 2024-05-21 RX ADMIN — ROCURONIUM BROMIDE 30 MG: 10 SOLUTION INTRAVENOUS at 08:05

## 2024-05-21 RX ADMIN — HYDROCODONE BITARTRATE AND ACETAMINOPHEN 1 TABLET: 5; 325 TABLET ORAL at 05:05

## 2024-05-21 RX ADMIN — SODIUM CHLORIDE: 9 INJECTION, SOLUTION INTRAVENOUS at 07:05

## 2024-05-21 RX ADMIN — HYDROCODONE BITARTRATE AND ACETAMINOPHEN 1 TABLET: 5; 325 TABLET ORAL at 11:05

## 2024-05-21 RX ADMIN — HYDRALAZINE HYDROCHLORIDE 100 MG: 25 TABLET, FILM COATED ORAL at 05:05

## 2024-05-21 RX ADMIN — DEXAMETHASONE SODIUM PHOSPHATE 4 MG: 4 INJECTION, SOLUTION INTRA-ARTICULAR; INTRALESIONAL; INTRAMUSCULAR; INTRAVENOUS; SOFT TISSUE at 07:05

## 2024-05-21 RX ADMIN — LEVOFLOXACIN 750 MG: 750 TABLET, FILM COATED ORAL at 11:05

## 2024-05-21 RX ADMIN — ROCURONIUM BROMIDE 60 MG: 10 SOLUTION INTRAVENOUS at 07:05

## 2024-05-21 RX ADMIN — ONDANSETRON 4 MG: 2 INJECTION INTRAMUSCULAR; INTRAVENOUS at 08:05

## 2024-05-21 RX ADMIN — MORPHINE SULFATE 2 MG: 2 INJECTION, SOLUTION INTRAMUSCULAR; INTRAVENOUS at 02:05

## 2024-05-21 RX ADMIN — HYDRALAZINE HYDROCHLORIDE 100 MG: 25 TABLET, FILM COATED ORAL at 04:05

## 2024-05-21 RX ADMIN — LABETALOL HYDROCHLORIDE 200 MG: 200 TABLET, FILM COATED ORAL at 04:05

## 2024-05-21 RX ADMIN — WARFARIN SODIUM 11.25 MG: 2.5 TABLET ORAL at 04:05

## 2024-05-21 RX ADMIN — PHENYLEPHRINE HYDROCHLORIDE 200 MCG: 10 INJECTION INTRAVENOUS at 07:05

## 2024-05-21 RX ADMIN — SUGAMMADEX 200 MG: 100 INJECTION, SOLUTION INTRAVENOUS at 08:05

## 2024-05-21 RX ADMIN — OXYCODONE HYDROCHLORIDE AND ACETAMINOPHEN 1 TABLET: 5; 325 TABLET ORAL at 09:05

## 2024-05-21 RX ADMIN — ROCURONIUM BROMIDE 10 MG: 10 SOLUTION INTRAVENOUS at 07:05

## 2024-05-21 RX ADMIN — CALCIUM ACETATE 2001 MG: 667 CAPSULE ORAL at 11:05

## 2024-05-21 RX ADMIN — VASOPRESSIN 1 UNITS: 20 INJECTION INTRAVENOUS at 08:05

## 2024-05-21 RX ADMIN — SODIUM CHLORIDE 2 G: 9 INJECTION, SOLUTION INTRAVENOUS at 07:05

## 2024-05-21 RX ADMIN — FENTANYL CITRATE 25 MCG: 50 INJECTION INTRAMUSCULAR; INTRAVENOUS at 09:05

## 2024-05-21 RX ADMIN — FENTANYL CITRATE 100 MCG: 0.05 INJECTION, SOLUTION INTRAMUSCULAR; INTRAVENOUS at 07:05

## 2024-05-21 RX ADMIN — MORPHINE SULFATE 2 MG: 2 INJECTION, SOLUTION INTRAMUSCULAR; INTRAVENOUS at 07:05

## 2024-05-21 RX ADMIN — PROPOFOL 140 MG: 10 INJECTION, EMULSION INTRAVENOUS at 07:05

## 2024-05-21 RX ADMIN — GENTAMICIN SULFATE 80 MG: 80 INJECTION, SOLUTION INTRAVENOUS at 07:05

## 2024-05-21 RX ADMIN — LIDOCAINE HYDROCHLORIDE 50 MG: 20 INJECTION, SOLUTION EPIDURAL; INFILTRATION; INTRACAUDAL; PERINEURAL at 07:05

## 2024-05-21 NOTE — OP NOTE
Date of Procedure: 05/21/2024     PREOPERATIVE DIAGNOSIS:  Erectile dysfunction.     POSTOPERATIVE DIAGNOSIS:  Erectile dysfunction.     PROCEDURES:  Insertion of 3 piece inflatable penile prosthesis.     SURGEON:  Boniafcio Shirley M.D.     Assistants: None     Specimen:  None     ANESTHESIA:  General endotracheal.     BLOOD LOSS:  Minimal     FINDINGS:  21 cm implant with 100 ml reservoir     PROCEDURE IN DETAIL:  Patient was brought into the operating suite where he was placed under general anesthesia in the supine position.  Patient was then sterilely prepped and draped, with a 10 min prep.  16 Swazi Caldwell catheter was then inserted with clear yellow urine upon return, 10 mL of sterile water was placed in the balloon set nicely pulled the bladder neck.  Then using the assistance of the [place] retractor, incision was made at the penis scrotal junction.  Dissection was continued down electrocautery Bovie and then blunt dissection until we exposed the tissue overlying the corporal cavernosa.  Again retractors were placed to expose tissue, urethra was easily identified.  Using the Metzenbaum scissors were able to expose the tissue overlying the corpora cavernosa on both the left and the right.  Again urethra was easily identified, the ventral tissue overlying the urethra and scrotum was then taken with the electrocautery Bovie for exposure.  2 0 Vicryl sutures were used as stay sutures along the corpora bilaterally.  Incision was made along the left corpora, completing our corporotomy.  Using the Metzenbaum scissors were able to dissect up to the distal aspect of the corpora, dilators were then used both proximally and distally from 9-13 Swazi proximally and distally.  Furlough was then used to measure, distally 11 cm and 10 cm proximally, 21 cm total.  At this point we determined we would use a 21 cm implant.  We then approached the right corpora, incision was made completing our corporotomy.  Again Christiano  than dilators were used to dilate from 9-13 Wallisian proximally and distally.  Furlough was again used to measure distal 11 cm, proximal 10 cm, 21 cm total, the prosthesis was prepared.  Then with blunt dissection, we were able to expose the space of Retzius, and a plane and pocket was made for the reservoir.  We chose to use a conceal reservoir with 100 mL of sterile saline placed within the reservoir after it was appropriately placed within the pocket.  Then with the assistance of the Daphne we were able to place our corporal implants through both the left and right corporotomies and up through the glans appropriately.  Test dilation was done demonstrating good expansion of the implants with no evidence of significant curvature and good position of the tips within the glans bilaterally.  The corporal incisions were then closed bilaterally with the stay sutures.  Using the boots and cuff we were able to attach the two edges of the tubing appropriately.  A test inflation was again performed and successful.  The pump was placed appropriately within the scrotum.  We then closed the deep layers with interrupted 2 0 Vicryl sutures.  Dermis was closed with a running 2 0 Vicryl suture.  Skin was closed with a horizontal mattress suture, using 3 0 chromic.  Antibiotic ointment and sterile dressings were applied, as was taping.  Caldwell catheter will be left in place overnight.  We will deflate the prosthesis and remove the Caldwell tomorrow, he will return in 5 weeks for activation.     COMPLICATIONS:  None

## 2024-05-21 NOTE — NURSING
Pt remains free of falls/injury. Safety precautions maintained.  Renal coumadin restriction diet tolerated. Pt began to leak blood at the insertion site of the GERARD drain.  Dr. Shirley came to the bedside stopped the bleed and redressed the site.  Pt has been monitored no bleeding has occurred since.  VSS. No S/S of distress noted at this time. Bed alarm set.12 hour chart check complete. Will continue to monitor.

## 2024-05-21 NOTE — PROGRESS NOTES
Pharmacy Consult Note: Warfarin     Isidro Sheriffteresita Diaz 's Coumadin will be dosed and monitored by Pharmacy.      Target INR goal is 2-3     INR   Date Value Ref Range Status   05/21/2024 1.4 (H) 0.8 - 1.2 Final     Comment:     Coumadin Therapy:  2.0 - 3.0 for INR for all indicators except mechanical heart valves  and antiphospholipid syndromes which should use 2.5 - 3.5.         Indication: afib, hx of MAVR  Home dose: 7.5 mg MWF & 11.25 mg all other days.     Patient will be continued on home regimen.   Dose for Today: 11.25 mg     Minor drug interactions: acetaminophen, cefazolin, and levofloxacin can enhance INR effects.     PT/INR will be monitored daily. Dose adjustments will be made accordingly.      Thank you for allowing us to participate in this patient's care.     Ana Garcia 5/21/2024 1:50 PM

## 2024-05-21 NOTE — ANESTHESIA PROCEDURE NOTES
Arterial    Diagnosis: Pulm HTN, ESRD    Patient location during procedure: done in OR  Timeout: 5/21/2024 7:23 AM  Procedure end time: 5/21/2024 7:23 AM    Staffing  Authorizing Provider: Nicolás Rodriguez MD  Performing Provider: Nicolás Rodriguez MD    Staffing  Performed by: Nicolás Rodriguez MD  Authorized by: Nicolás Rodriguez MD    Anesthesiologist was present at the time of the procedure.    Preanesthetic Checklist  Completed: patient identified, IV checked, site marked, risks and benefits discussed, surgical consent, monitors and equipment checked, pre-op evaluation, timeout performed and anesthesia consent givenArterial  Skin Prep: chlorhexidine gluconate  Local Infiltration: none  Orientation: right  Location: radial    Catheter Size: 20 G  Catheter placement by Ultrasound guidance. Heme positive aspiration all ports.   Vessel Caliber: medium, patent, compressibility poor (copious calcification noted)  Vascular Doppler:  not done  Needle advanced into vessel with real time Ultrasound guidance.Insertion Attempts: 1  Assessment  Dressing: secured with tape and tegaderm  Patient: Tolerated well

## 2024-05-21 NOTE — TRANSFER OF CARE
"Anesthesia Transfer of Care Note    Patient: Isidro White Jr.    Procedure(s) Performed: Procedure(s) (LRB):  INSERTION, PENILE PROSTHESIS, INFLATABLE (N/A)    Patient location: PACU    Anesthesia Type: general    Transport from OR: Transported from OR on room air with adequate spontaneous ventilation    Post pain: adequate analgesia    Post assessment: no apparent anesthetic complications    Post vital signs: stable    Level of consciousness: responds to stimulation    Nausea/Vomiting: no nausea/vomiting    Complications: none    Transfer of care protocol was followed      Last vitals: Visit Vitals  BP (!) 130/94 (BP Location: Right arm, Patient Position: Sitting)   Pulse 95   Temp 36.9 °C (98.4 °F) (Oral)   Resp 18   Ht 6' 2" (1.88 m)   Wt 104.5 kg (230 lb 6.1 oz)   SpO2 96%   BMI 29.58 kg/m²     "

## 2024-05-21 NOTE — PROGRESS NOTES
Nephrology Progress Note     History of Present Illness     hx of end-stage renal disease presented to the ER directly admitted from Urology office for a scheduled penile prosthesis procedure.  Hospital Medicine was consulted to manage medical problems and to bridge with Lovenox and Coumadin for procedure.     His last dialysis outpatient was on Friday 05/17/2024.     Nephrology consulted to provide routine dialysis while here.             Interval History   Overnight/currently: doing ok. Had surgery today. Penile implant        Allergies:    has No Known Allergies.    Current medications:   Scheduled Meds:   atorvastatin  40 mg Oral Daily    calcium acetate(phosphat bind)  2,001 mg Oral TID WM    [START ON 5/22/2024] ceFAZolin (Ancef) IV (PEDS and ADULTS)  1 g Intravenous Q24H    diltiaZEM  420 mg Oral Daily    ferrous sulfate  1 tablet Oral Daily    hydrALAZINE  100 mg Oral Q8H    labetaloL  200 mg Oral TID    [START ON 5/23/2024] levoFLOXacin  500 mg Oral Every other day    lisinopriL  40 mg Oral Daily    multivitamin  1 tablet Oral Daily    mupirocin   Nasal BID    pantoprazole  40 mg Oral Daily    [START ON 5/22/2024] warfarin  7.5 mg Oral Every Mon, Wed, Fri    warfarin  11.25 mg Oral Once per day on Sunday Tuesday Thursday Saturday     Continuous Infusions:  PRN Meds:.  Current Facility-Administered Medications:     acetaminophen, 650 mg, Rectal, Q6H PRN    acetaminophen, 650 mg, Oral, Q8H PRN    albuterol-ipratropium, 3 mL, Nebulization, Q6H PRN    aluminum-magnesium hydroxide-simethicone, 30 mL, Oral, QID PRN    dextrose 10%, 12.5 g, Intravenous, PRN    dextrose 10%, 25 g, Intravenous, PRN    glucagon (human recombinant), 1 mg, Intramuscular, PRN    glucose, 16 g, Oral, PRN    glucose, 24 g, Oral, PRN    HYDROcodone-acetaminophen, 1 tablet, Oral, Q6H PRN    melatonin, 6 mg, Oral, Nightly PRN    morphine, 2 mg, Intravenous, Q4H PRN    naloxone, 0.02 mg, Intravenous, PRN    ondansetron, 4 mg, Intravenous, Q8H  "PRN    promethazine, 25 mg, Oral, Q6H PRN    senna-docusate 8.6-50 mg, 1 tablet, Oral, BID PRN    sodium chloride 0.9%, 250 mL, Intravenous, PRN    sodium chloride 0.9%, 10 mL, Intravenous, Q12H PRN     Physical Examination     VS/Measurements    /60 (BP Location: Right arm, Patient Position: Lying)   Pulse 71   Temp 97.4 °F (36.3 °C) (Oral)   Resp 18   Ht 6' 2" (1.88 m)   Wt 104.5 kg (230 lb 6.1 oz)   SpO2 (!) 92%   BMI 29.58 kg/m²         General:  Moderately built, not in any distress.  Neck:  Supple,   Respiratory: Non-labored,  Lungs are clear to auscultation.    Cardiovascular:  Normal rate, Regular rhythm.   Abdomen:  Soft, Non-tender, Normal bowel sounds.   Muskuloskeletal:  No pedal edema          Laboratory Results   Today's Lab Results :    Recent Results (from the past 24 hour(s))   Comprehensive Metabolic Panel (CMP)    Collection Time: 05/21/24  5:34 AM   Result Value Ref Range    Sodium 137 136 - 145 mmol/L    Potassium 4.4 3.5 - 5.1 mmol/L    Chloride 102 95 - 110 mmol/L    CO2 20 (L) 23 - 29 mmol/L    Glucose 105 70 - 110 mg/dL    BUN 43 (H) 6 - 20 mg/dL    Creatinine 12.0 (H) 0.5 - 1.4 mg/dL    Calcium 10.2 8.7 - 10.5 mg/dL    Total Protein 8.4 6.0 - 8.4 g/dL    Albumin 3.5 3.5 - 5.2 g/dL    Total Bilirubin 0.8 0.1 - 1.0 mg/dL    Alkaline Phosphatase 69 55 - 135 U/L    AST 9 (L) 10 - 40 U/L    ALT 8 (L) 10 - 44 U/L    eGFR 5 (A) >60 mL/min/1.73 m^2    Anion Gap 15 8 - 16 mmol/L   CBC with Automated Differential    Collection Time: 05/21/24  5:34 AM   Result Value Ref Range    WBC 5.84 3.90 - 12.70 K/uL    RBC 3.60 (L) 4.60 - 6.20 M/uL    Hemoglobin 11.5 (L) 14.0 - 18.0 g/dL    Hematocrit 35.3 (L) 40.0 - 54.0 %    MCV 98 82 - 98 fL    MCH 31.9 (H) 27.0 - 31.0 pg    MCHC 32.6 32.0 - 36.0 g/dL    RDW 15.9 (H) 11.5 - 14.5 %    Platelets 136 (L) 150 - 450 K/uL    MPV 11.0 9.2 - 12.9 fL    Immature Granulocytes 0.2 0.0 - 0.5 %    Gran # (ANC) 3.8 1.8 - 7.7 K/uL    Immature Grans (Abs) 0.01 " 0.00 - 0.04 K/uL    Lymph # 1.1 1.0 - 4.8 K/uL    Mono # 0.7 0.3 - 1.0 K/uL    Eos # 0.2 0.0 - 0.5 K/uL    Baso # 0.02 0.00 - 0.20 K/uL    nRBC 0 0 /100 WBC    Gran % 65.3 38.0 - 73.0 %    Lymph % 19.3 18.0 - 48.0 %    Mono % 11.3 4.0 - 15.0 %    Eosinophil % 3.6 0.0 - 8.0 %    Basophil % 0.3 0.0 - 1.9 %    Differential Method Automated    Protime-INR    Collection Time: 05/21/24  5:34 AM   Result Value Ref Range    Prothrombin Time 15.7 (H) 9.0 - 12.5 sec    INR 1.4 (H) 0.8 - 1.2       LABS:  Reviewed Yes      Intake/Output Summary (Last 24 hours) at 5/21/2024 1651  Last data filed at 5/21/2024 0903  Gross per 24 hour   Intake 640 ml   Output 4000 ml   Net -3360 ml       Assessment and Plan     End-stage renal disease, Monday Wednesday Friday at [facility] on [address]-  --hep B, bedside     Erectile dysfunction   -admitted for penile prosthesis surgery by Urology     History of mechanical aortic valve replacement  -Lovenox in the hospital     Chronic diastolic heart failure  Atrial fibrillation  -last ejection fraction 60% with grade 2 diastolic dysfunction     Hypertension with chronic kidney disease   -controlled on current meds      Anemia of chronic kidney disease        Chronic hepatitis-B  -bedside dialysis for future treatments     Secondary hyperparathyroidism of renal origin        ________________________________________________  Braxton Mcneil

## 2024-05-21 NOTE — ANESTHESIA PROCEDURE NOTES
Intubation    Date/Time: 5/21/2024 7:19 AM    Performed by: Nicolás Aguayo  Authorized by: Nicolás Rodriguez MD    Intubation:     Induction:  Intravenous    Intubated:  Postinduction    Mask Ventilation:  Easy mask    Attempts:  1    Attempted By:  Student    Method of Intubation:  Video laryngoscopy    Blade:  Lu 3    Laryngeal View Grade: Grade I - full view of cords      Difficult Airway Encountered?: No      Complications:  None    Airway Device:  Oral endotracheal tube    Airway Device Size:  7.5    Style/Cuff Inflation:  Cuffed    Tube secured:  22    Secured at:  The lips    Placement Verified By:  Capnometry    Complicating Factors:  None    Findings Post-Intubation:  BS equal bilateral and atraumatic/condition of teeth unchanged

## 2024-05-21 NOTE — PROGRESS NOTES
Pharmacist Renal Dose Adjustment Note    Isidro White Jr. is a 54 y.o. male being treated with the medication levofloxacin.    Patient Data:    Vital Signs (Most Recent):  Temp: 97 °F (36.1 °C) (05/21/24 0905)  Pulse: 77 (05/21/24 0905)  Resp: 16 (05/21/24 0905)  BP: (!) 133/56 (05/21/24 0905)  SpO2: 100 % (05/21/24 0905) Vital Signs (72h Range):  Temp:  [97 °F (36.1 °C)-98.4 °F (36.9 °C)]   Pulse:  []   Resp:  [15-20]   BP: (130-158)/(56-94)   SpO2:  [92 %-100 %]      Recent Labs   Lab 05/20/24  0417 05/21/24  0534   CREATININE 16.1* 12.0*     Serum creatinine: 12 mg/dL (H) 05/21/24 0534  Estimated creatinine clearance: 9.2 mL/min (A)    Levofloxacin 750 mg daily was adjusted to 750 mg once followed by 500 mg every 48 hours according to [facility]'s renal dose adjustment policy.    Pharmacist's Name: Mindy Obrien, PharmD 5/21/2024 9:15 AM  Pharmacist's Extension: [phone]

## 2024-05-21 NOTE — PLAN OF CARE
Problem: Adult Inpatient Plan of Care  Goal: Plan of Care Review  Outcome: Progressing  Goal: Patient-Specific Goal (Individualized)  Outcome: Progressing  Goal: Absence of Hospital-Acquired Illness or Injury  Outcome: Progressing  Goal: Optimal Comfort and Wellbeing  Outcome: Progressing  Goal: Readiness for Transition of Care  Outcome: Progressing     Problem: Fall Injury Risk  Goal: Absence of Fall and Fall-Related Injury  Outcome: Progressing     Problem: Hemodialysis  Goal: Safe, Effective Therapy Delivery  Outcome: Progressing  Goal: Effective Tissue Perfusion  Outcome: Progressing  Goal: Absence of Infection Signs and Symptoms  Outcome: Progressing     Problem: Wound  Goal: Optimal Coping  Outcome: Progressing  Goal: Optimal Functional Ability  Outcome: Progressing  Goal: Absence of Infection Signs and Symptoms  Outcome: Progressing  Goal: Improved Oral Intake  Outcome: Progressing  Goal: Optimal Pain Control and Function  Outcome: Progressing  Goal: Skin Health and Integrity  Outcome: Progressing  Goal: Optimal Wound Healing  Outcome: Progressing

## 2024-05-21 NOTE — PLAN OF CARE
Patient remains free of hospital injury. VSS. NPO after midnight for surgery in AM, verbalized understanding. Call light in reach, bed in low position. All patient questions answered. Purposeful rounding Q2 hours. Will continue to monitor. Chart review complete.     Problem: Adult Inpatient Plan of Care  Goal: Patient-Specific Goal (Individualized)  Outcome: Progressing     Problem: Adult Inpatient Plan of Care  Goal: Absence of Hospital-Acquired Illness or Injury  Outcome: Progressing     Problem: Adult Inpatient Plan of Care  Goal: Optimal Comfort and Wellbeing  Outcome: Progressing     Problem: Adult Inpatient Plan of Care  Goal: Readiness for Transition of Care  Outcome: Progressing     Problem: Fall Injury Risk  Goal: Absence of Fall and Fall-Related Injury  Outcome: Progressing     Problem: Hemodialysis  Goal: Safe, Effective Therapy Delivery  Outcome: Progressing      Providers





- Providers


Date of Admission: 


05/31/19 03:21





Date of discharge: 06/02/19


Attending physician: 


YESICA PEDROZA





                                        





05/31/19 08:15


Consult to Physician [CONS] Routine 


   Comment: 


   Consulting Provider: MELLO CARR


   Physician Instructions: 


   Reason For Exam: syncope





05/31/19 12:54


Physical Therapy Evaluation and Treat [CONS] Routine 


   Comment: 


   Reason For Exam: SYNCOPE











Primary care physician: 


KONSTANTIN TINSLEY








Hospitalization


Reason for admission: syncope from dehydration,


Condition: Stable


Pertinent studies: 





CT scan of the head and spine were unremarkable for any acute events, MRI/MRA of

 the brain showed no evidence of acute infection.  Decision was unremarkable for

 any acute event.


EKG showed no dysrhythmias.





Procedures: 





None


Hospital course: 








Pt is a 68-year-old female with PMHx of GERD, hypertension, multiple sclerosis, 

and mitral valve prolapse (MVP) who was brought to the ER for complaints of 

syncopal episode.  Patient states she felt lightheaded and weak, lost 

consciousness and collapsed to the ground.  Pt's daughter who witness the 

incident states that she passed out  for a short period, landed on the ground 

and strike the left side of her head.  She did not report any seizure activity, 

bladder or bowel incontinence, postical confusion or disorientation.  Patient 

complains of headache, pain on the left rib cage, that is worse with movement, 

manipulation and with deep breathing.  Pt denies chest pain, denies palpitation

s, denies abdominal pain, denies dizziness, denies nausea, denies vomiting, 

denies SOB,  denies diaphoresis.  On admission, pt was alert and orientated x 4,

 pleasant, able to provide medical history with excellent memory. A CT scan of 

the head was negative except for mild decrease periventricular white matter, CT 

spine negative fracture except for degenerative joint disease, reviewed 

extremity was negative for fracture.  Patient is admitted for further evaluation

 of her syncopal episode.  Patient was found to have leukocytosis with slightly 

elevated BUN and creatinine both of which improved with IV hydration.  MRI/MRA 

of the brain and MRA of the neck with echocardiogram was unremarkable.  Ejection

 fraction was found to be 55-60% with diastolic dysfunction.  Leukocytosis is 

resolved.  Patient had no syncopal episode.  Complains of occasional headache.  

No nausea no vomiting.  She will be discharged today to follow primary care 

physician in 3-5 days











Disposition: DC-01 TO HOME OR SELFCARE


Time spent for discharge: 40 mins





Core Measure Documentation





- Palliative Care


Palliative Care/ Comfort Measures: Not Applicable





- Core Measures


Any of the following diagnoses?: none





Exam





- Physical Exam


Narrative exam: 





Constitutional: Well-nourished well-developed. In no distress


Head: Normocephalic atraumatic


Eyes: Pupils are equal round and reactive to light


Nose: No enlarged turbinates, no septal deviation.


Mouth: Moist mucous membranes.


Neck: Supple no thyromegaly.  No bruit.  No JVD


Heart: Regular rate and rhythm, S1-S2 normal.  No rubs murmurs or gallop


Lungs: Clear to auscultation bilaterally. no rales or rhonchi


Abdomen: Soft, nontender. Bowel sound are present. 


Extremities: No edema, no cyanosis, no clubbing.


Neuro: Alert oriented Oriented x3. No focal sensory or motor deficit.


Skin: No rashes or hyperpigmented spots


Musculoskeletal system: No joint pain or swelling


Hematological: No petechia or subcutanous hemorrhages.


Immunological: No multiple septic spots on the skin


Lymphatic: No generalized lymphadenopathy


Psychiatry: Euthymic. Calm.














- Constitutional


Vitals: 


                                        











Temp Pulse Resp BP Pulse Ox


 


 98.2 F   70   16   156/83   100 


 


 06/02/19 07:30  06/02/19 07:30  06/02/19 07:30  06/02/19 07:30  06/02/19 07:30














Plan


Activity: fall precautions


Weight Bearing Status: Non-Weight Bearing


Diet: low salt


Follow up with: 


KONSTANTIN TINSLEY MD [Primary Care Provider] - 7 Days


Prescriptions: 


Ciprofloxacin HCl [Ciprofloxacin TAB] 500 mg PO Q12HR #10 tab


Butalb/Acetaminophen/Caffeine [Fioricet -40 mg CAP] 1 cap PO Q6HR PRN #20 

cap


 PRN Reason: Headache

## 2024-05-21 NOTE — ANESTHESIA POSTPROCEDURE EVALUATION
Anesthesia Post Evaluation    Patient: Isidro White JrKatya    Procedure(s) Performed: Procedure(s) (LRB):  INSERTION, PENILE PROSTHESIS, INFLATABLE (N/A)    Final Anesthesia Type: general      Patient location during evaluation: PACU  Patient participation: Yes- Able to Participate  Level of consciousness: awake and alert and oriented  Post-procedure vital signs: reviewed and stable  Pain management: adequate  Airway patency: patent  DANIEL mitigation strategies: Verification of full reversal of neuromuscular block  PONV status at discharge: No PONV  Anesthetic complications: no      Cardiovascular status: blood pressure returned to baseline and hemodynamically stable  Respiratory status: unassisted  Hydration status: euvolemic  Follow-up not needed.              Vitals Value Taken Time   /60 05/21/24 1020   Temp 36.4 °C (97.5 °F) 05/21/24 1020   Pulse 73 05/21/24 1020   Resp 16 05/21/24 1110   SpO2 95 % 05/21/24 1020         Event Time   Out of Recovery 05/21/2024 10:06:21         Pain/Dru Score: Pain Rating Prior to Med Admin: 9 (5/21/2024 11:10 AM)  Dru Score: 8 (5/21/2024 10:00 AM)

## 2024-05-21 NOTE — PROGRESS NOTES
Pharmacist Renal Dose Adjustment Note    Isidro White Jr. is a 54 y.o. male being treated with the medication ancef    Patient Data:    Vital Signs (Most Recent):  Temp: 97.5 °F (36.4 °C) (05/21/24 1020)  Pulse: 73 (05/21/24 1020)  Resp: 16 (05/21/24 1110)  BP: 129/60 (05/21/24 1020)  SpO2: 95 % (05/21/24 1020) Vital Signs (72h Range):  Temp:  [97 °F (36.1 °C)-98.4 °F (36.9 °C)]   Pulse:  []   Resp:  [12-20]   BP: (129-158)/(56-94)   SpO2:  [92 %-100 %]   Arterial Line BP: (114-127)/(44-54)      Recent Labs   Lab 05/20/24  0417 05/21/24  0534   CREATININE 16.1* 12.0*     Serum creatinine: 12 mg/dL (H) 05/21/24 0534  Estimated creatinine clearance: 9.2 mL/min (A)    Medication:ancef 1g every 12 hours will be changed to ancef 1g every 24 hours for HD dosing.     Pharmacist's Name: Ana Garcia  Pharmacist's Extension: [phone]

## 2024-05-22 PROBLEM — D63.8 ANEMIA OF CHRONIC DISEASE: Status: ACTIVE | Noted: 2024-05-22

## 2024-05-22 PROBLEM — E78.5 HYPERLIPIDEMIA: Status: ACTIVE | Noted: 2023-12-06

## 2024-05-22 PROBLEM — R74.01 TRANSAMINITIS: Status: ACTIVE | Noted: 2024-05-22

## 2024-05-22 LAB
ABO + RH BLD: NORMAL
ALBUMIN SERPL BCP-MCNC: 3 G/DL (ref 3.5–5.2)
ALBUMIN SERPL BCP-MCNC: 3.4 G/DL (ref 3.5–5.2)
ALP SERPL-CCNC: 48 U/L (ref 55–135)
ALP SERPL-CCNC: 52 U/L (ref 55–135)
ALT SERPL W/O P-5'-P-CCNC: 217 U/L (ref 10–44)
ALT SERPL W/O P-5'-P-CCNC: 55 U/L (ref 10–44)
ANION GAP SERPL CALC-SCNC: 18 MMOL/L (ref 8–16)
ANION GAP SERPL CALC-SCNC: 19 MMOL/L (ref 8–16)
AST SERPL-CCNC: 175 U/L (ref 10–40)
AST SERPL-CCNC: 49 U/L (ref 10–40)
BASOPHILS # BLD AUTO: 0.01 K/UL (ref 0–0.2)
BASOPHILS # BLD AUTO: 0.01 K/UL (ref 0–0.2)
BASOPHILS NFR BLD: 0.1 % (ref 0–1.9)
BASOPHILS NFR BLD: 0.1 % (ref 0–1.9)
BILIRUB SERPL-MCNC: 0.5 MG/DL (ref 0.1–1)
BILIRUB SERPL-MCNC: 0.6 MG/DL (ref 0.1–1)
BLD GP AB SCN CELLS X3 SERPL QL: NORMAL
BUN SERPL-MCNC: 55 MG/DL (ref 6–20)
BUN SERPL-MCNC: 83 MG/DL (ref 6–20)
CALCIUM SERPL-MCNC: 9 MG/DL (ref 8.7–10.5)
CALCIUM SERPL-MCNC: 9.2 MG/DL (ref 8.7–10.5)
CHLORIDE SERPL-SCNC: 100 MMOL/L (ref 95–110)
CHLORIDE SERPL-SCNC: 100 MMOL/L (ref 95–110)
CO2 SERPL-SCNC: 17 MMOL/L (ref 23–29)
CO2 SERPL-SCNC: 18 MMOL/L (ref 23–29)
CREAT SERPL-MCNC: 10.1 MG/DL (ref 0.5–1.4)
CREAT SERPL-MCNC: 14.9 MG/DL (ref 0.5–1.4)
DIFFERENTIAL METHOD BLD: ABNORMAL
DIFFERENTIAL METHOD BLD: ABNORMAL
EOSINOPHIL # BLD AUTO: 0 K/UL (ref 0–0.5)
EOSINOPHIL # BLD AUTO: 0 K/UL (ref 0–0.5)
EOSINOPHIL NFR BLD: 0 % (ref 0–8)
EOSINOPHIL NFR BLD: 0 % (ref 0–8)
ERYTHROCYTE [DISTWIDTH] IN BLOOD BY AUTOMATED COUNT: 16.2 % (ref 11.5–14.5)
ERYTHROCYTE [DISTWIDTH] IN BLOOD BY AUTOMATED COUNT: 16.2 % (ref 11.5–14.5)
EST. GFR  (NO RACE VARIABLE): 4 ML/MIN/1.73 M^2
EST. GFR  (NO RACE VARIABLE): 6 ML/MIN/1.73 M^2
GLUCOSE SERPL-MCNC: 124 MG/DL (ref 70–110)
GLUCOSE SERPL-MCNC: 85 MG/DL (ref 70–110)
HCT VFR BLD AUTO: 22.2 % (ref 40–54)
HCT VFR BLD AUTO: 26.2 % (ref 40–54)
HCT VFR BLD AUTO: 26.6 % (ref 40–54)
HGB BLD-MCNC: 7.3 G/DL (ref 14–18)
HGB BLD-MCNC: 8.5 G/DL (ref 14–18)
HGB BLD-MCNC: 8.7 G/DL (ref 14–18)
IMM GRANULOCYTES # BLD AUTO: 0.03 K/UL (ref 0–0.04)
IMM GRANULOCYTES # BLD AUTO: 0.07 K/UL (ref 0–0.04)
IMM GRANULOCYTES NFR BLD AUTO: 0.3 % (ref 0–0.5)
IMM GRANULOCYTES NFR BLD AUTO: 0.6 % (ref 0–0.5)
INR PPP: 1.4 (ref 0.8–1.2)
LYMPHOCYTES # BLD AUTO: 0.8 K/UL (ref 1–4.8)
LYMPHOCYTES # BLD AUTO: 1 K/UL (ref 1–4.8)
LYMPHOCYTES NFR BLD: 6.9 % (ref 18–48)
LYMPHOCYTES NFR BLD: 9 % (ref 18–48)
MCH RBC QN AUTO: 32.4 PG (ref 27–31)
MCH RBC QN AUTO: 32.7 PG (ref 27–31)
MCHC RBC AUTO-ENTMCNC: 32 G/DL (ref 32–36)
MCHC RBC AUTO-ENTMCNC: 32.9 G/DL (ref 32–36)
MCV RBC AUTO: 100 FL (ref 82–98)
MCV RBC AUTO: 102 FL (ref 82–98)
MONOCYTES # BLD AUTO: 1.1 K/UL (ref 0.3–1)
MONOCYTES # BLD AUTO: 1.2 K/UL (ref 0.3–1)
MONOCYTES NFR BLD: 10.5 % (ref 4–15)
MONOCYTES NFR BLD: 9.8 % (ref 4–15)
NEUTROPHILS # BLD AUTO: 9 K/UL (ref 1.8–7.7)
NEUTROPHILS # BLD AUTO: 9.7 K/UL (ref 1.8–7.7)
NEUTROPHILS NFR BLD: 80.8 % (ref 38–73)
NEUTROPHILS NFR BLD: 81.9 % (ref 38–73)
NRBC BLD-RTO: 0 /100 WBC
NRBC BLD-RTO: 0 /100 WBC
OHS QRS DURATION: 184 MS
OHS QRS DURATION: 190 MS
OHS QTC CALCULATION: 642 MS
OHS QTC CALCULATION: 658 MS
PLATELET # BLD AUTO: 118 K/UL (ref 150–450)
PLATELET # BLD AUTO: 127 K/UL (ref 150–450)
PMV BLD AUTO: 11.4 FL (ref 9.2–12.9)
PMV BLD AUTO: 11.4 FL (ref 9.2–12.9)
POTASSIUM SERPL-SCNC: 4.7 MMOL/L (ref 3.5–5.1)
POTASSIUM SERPL-SCNC: 7.2 MMOL/L (ref 3.5–5.1)
PROT SERPL-MCNC: 6.6 G/DL (ref 6–8.4)
PROT SERPL-MCNC: 6.8 G/DL (ref 6–8.4)
PROTHROMBIN TIME: 16.3 SEC (ref 9–12.5)
RBC # BLD AUTO: 2.23 M/UL (ref 4.6–6.2)
RBC # BLD AUTO: 2.62 M/UL (ref 4.6–6.2)
SODIUM SERPL-SCNC: 136 MMOL/L (ref 136–145)
SODIUM SERPL-SCNC: 136 MMOL/L (ref 136–145)
SPECIMEN OUTDATE: NORMAL
TROPONIN I SERPL DL<=0.01 NG/ML-MCNC: 0.05 NG/ML (ref 0–0.03)
WBC # BLD AUTO: 11.17 K/UL (ref 3.9–12.7)
WBC # BLD AUTO: 11.77 K/UL (ref 3.9–12.7)

## 2024-05-22 PROCEDURE — 30233N1 TRANSFUSION OF NONAUTOLOGOUS RED BLOOD CELLS INTO PERIPHERAL VEIN, PERCUTANEOUS APPROACH: ICD-10-PCS | Performed by: UROLOGY

## 2024-05-22 PROCEDURE — 36430 TRANSFUSION BLD/BLD COMPNT: CPT

## 2024-05-22 PROCEDURE — 85025 COMPLETE CBC W/AUTO DIFF WBC: CPT | Mod: 91 | Performed by: HOSPITALIST

## 2024-05-22 PROCEDURE — 86901 BLOOD TYPING SEROLOGIC RH(D): CPT | Performed by: UROLOGY

## 2024-05-22 PROCEDURE — 86920 COMPATIBILITY TEST SPIN: CPT | Performed by: UROLOGY

## 2024-05-22 PROCEDURE — 85018 HEMOGLOBIN: CPT

## 2024-05-22 PROCEDURE — P9047 ALBUMIN (HUMAN), 25%, 50ML: HCPCS | Mod: JZ,JG | Performed by: INTERNAL MEDICINE

## 2024-05-22 PROCEDURE — 27000221 HC OXYGEN, UP TO 24 HOURS

## 2024-05-22 PROCEDURE — 25000003 PHARM REV CODE 250: Performed by: UROLOGY

## 2024-05-22 PROCEDURE — 25000003 PHARM REV CODE 250

## 2024-05-22 PROCEDURE — 36415 COLL VENOUS BLD VENIPUNCTURE: CPT

## 2024-05-22 PROCEDURE — 63600175 PHARM REV CODE 636 W HCPCS: Mod: JZ,JG | Performed by: HOSPITALIST

## 2024-05-22 PROCEDURE — 85610 PROTHROMBIN TIME: CPT | Performed by: UROLOGY

## 2024-05-22 PROCEDURE — 85014 HEMATOCRIT: CPT

## 2024-05-22 PROCEDURE — 84484 ASSAY OF TROPONIN QUANT: CPT

## 2024-05-22 PROCEDURE — P9016 RBC LEUKOCYTES REDUCED: HCPCS | Performed by: UROLOGY

## 2024-05-22 PROCEDURE — 93010 ELECTROCARDIOGRAM REPORT: CPT | Mod: ,,, | Performed by: INTERNAL MEDICINE

## 2024-05-22 PROCEDURE — 25000003 PHARM REV CODE 250: Performed by: NURSE PRACTITIONER

## 2024-05-22 PROCEDURE — 85025 COMPLETE CBC W/AUTO DIFF WBC: CPT

## 2024-05-22 PROCEDURE — 90935 HEMODIALYSIS ONE EVALUATION: CPT

## 2024-05-22 PROCEDURE — 20000000 HC ICU ROOM

## 2024-05-22 PROCEDURE — 25000003 PHARM REV CODE 250: Performed by: INTERNAL MEDICINE

## 2024-05-22 PROCEDURE — 80053 COMPREHEN METABOLIC PANEL: CPT

## 2024-05-22 PROCEDURE — 99233 SBSQ HOSP IP/OBS HIGH 50: CPT | Mod: ,,, | Performed by: INTERNAL MEDICINE

## 2024-05-22 PROCEDURE — 93005 ELECTROCARDIOGRAM TRACING: CPT

## 2024-05-22 PROCEDURE — 80053 COMPREHEN METABOLIC PANEL: CPT | Mod: 91 | Performed by: HOSPITALIST

## 2024-05-22 PROCEDURE — 63600175 PHARM REV CODE 636 W HCPCS: Mod: JZ,JG | Performed by: INTERNAL MEDICINE

## 2024-05-22 PROCEDURE — 63600175 PHARM REV CODE 636 W HCPCS: Performed by: UROLOGY

## 2024-05-22 PROCEDURE — 36415 COLL VENOUS BLD VENIPUNCTURE: CPT | Mod: XB | Performed by: UROLOGY

## 2024-05-22 PROCEDURE — 94761 N-INVAS EAR/PLS OXIMETRY MLT: CPT

## 2024-05-22 PROCEDURE — 25000003 PHARM REV CODE 250: Performed by: HOSPITALIST

## 2024-05-22 RX ORDER — WARFARIN 7.5 MG/1
7.5 TABLET ORAL
Status: DISCONTINUED | OUTPATIENT
Start: 2024-05-24 | End: 2024-05-24

## 2024-05-22 RX ORDER — DILTIAZEM HYDROCHLORIDE 30 MG/1
30 TABLET, FILM COATED ORAL EVERY 6 HOURS
Status: DISCONTINUED | OUTPATIENT
Start: 2024-05-22 | End: 2024-05-24

## 2024-05-22 RX ORDER — HYDROCODONE BITARTRATE AND ACETAMINOPHEN 500; 5 MG/1; MG/1
TABLET ORAL
Status: DISCONTINUED | OUTPATIENT
Start: 2024-05-22 | End: 2024-05-26 | Stop reason: HOSPADM

## 2024-05-22 RX ORDER — ALBUMIN HUMAN 250 G/1000ML
25 SOLUTION INTRAVENOUS
Status: DISCONTINUED | OUTPATIENT
Start: 2024-05-22 | End: 2024-05-26 | Stop reason: HOSPADM

## 2024-05-22 RX ORDER — METOPROLOL TARTRATE 1 MG/ML
5 INJECTION, SOLUTION INTRAVENOUS EVERY 5 MIN PRN
Status: DISCONTINUED | OUTPATIENT
Start: 2024-05-22 | End: 2024-05-22

## 2024-05-22 RX ADMIN — PANTOPRAZOLE SODIUM 40 MG: 40 TABLET, DELAYED RELEASE ORAL at 08:05

## 2024-05-22 RX ADMIN — ACETAMINOPHEN 650 MG: 325 TABLET ORAL at 08:05

## 2024-05-22 RX ADMIN — ATORVASTATIN CALCIUM 40 MG: 40 TABLET, FILM COATED ORAL at 08:05

## 2024-05-22 RX ADMIN — FERROUS SULFATE TAB 325 MG (65 MG ELEMENTAL FE) 1 EACH: 325 (65 FE) TAB at 08:05

## 2024-05-22 RX ADMIN — METOROPROLOL TARTRATE 5 MG: 5 INJECTION, SOLUTION INTRAVENOUS at 11:05

## 2024-05-22 RX ADMIN — CEFAZOLIN 1 G: 1 INJECTION, POWDER, FOR SOLUTION INTRAMUSCULAR; INTRAVENOUS at 08:05

## 2024-05-22 RX ADMIN — ACETAMINOPHEN 650 MG: 325 TABLET ORAL at 04:05

## 2024-05-22 RX ADMIN — MORPHINE SULFATE 2 MG: 2 INJECTION, SOLUTION INTRAMUSCULAR; INTRAVENOUS at 06:05

## 2024-05-22 RX ADMIN — ONDANSETRON 4 MG: 2 INJECTION INTRAMUSCULAR; INTRAVENOUS at 09:05

## 2024-05-22 RX ADMIN — DILTIAZEM HYDROCHLORIDE 30 MG: 30 TABLET, FILM COATED ORAL at 01:05

## 2024-05-22 RX ADMIN — MUPIROCIN: 20 OINTMENT TOPICAL at 10:05

## 2024-05-22 RX ADMIN — DILTIAZEM HYDROCHLORIDE 30 MG: 30 TABLET, FILM COATED ORAL at 06:05

## 2024-05-22 RX ADMIN — DILTIAZEM HYDROCHLORIDE 30 MG: 30 TABLET, FILM COATED ORAL at 11:05

## 2024-05-22 RX ADMIN — HYDROCODONE BITARTRATE AND ACETAMINOPHEN 1 TABLET: 5; 325 TABLET ORAL at 06:05

## 2024-05-22 RX ADMIN — THERA TABS 1 TABLET: TAB at 08:05

## 2024-05-22 RX ADMIN — ALBUMIN (HUMAN) 25 G: 5 SOLUTION INTRAVENOUS at 09:05

## 2024-05-22 RX ADMIN — MUPIROCIN: 20 OINTMENT TOPICAL at 08:05

## 2024-05-22 NOTE — SUBJECTIVE & OBJECTIVE
Interval History:  Patient seen and examined at bedside.  He was feeling bad with low blood pressure and rapid heart rate.    Review of Systems   Constitutional:  Positive for diaphoresis and fatigue.   Respiratory:  Negative for chest tightness.    Cardiovascular:  Positive for chest pain and palpitations.   Gastrointestinal:  Positive for abdominal distention and vomiting.   Genitourinary:  Positive for penile pain and penile swelling.   All other systems reviewed and are negative.    Objective:     Vital Signs (Most Recent):  Temp: 98.2 °F (36.8 °C) (05/22/24 1645)  Pulse: 89 (05/22/24 1645)  Resp: 14 (05/22/24 1645)  BP: (!) 110/55 (05/22/24 1645)  SpO2: 100 % (05/22/24 1645) Vital Signs (24h Range):  Temp:  [97.4 °F (36.3 °C)-98.2 °F (36.8 °C)] 98.2 °F (36.8 °C)  Pulse:  [71-94] 89  Resp:  [14-20] 14  SpO2:  [94 %-100 %] 100 %  BP: ()/(39-66) 110/55     Weight: 104.5 kg (230 lb 6.1 oz)  Body mass index is 29.58 kg/m².    Intake/Output Summary (Last 24 hours) at 5/22/2024 1757  Last data filed at 5/22/2024 1130  Gross per 24 hour   Intake 1250 ml   Output 730 ml   Net 520 ml         Physical Exam  Vitals reviewed.   Constitutional:       Appearance: He is ill-appearing.   HENT:      Head: Normocephalic and atraumatic.      Mouth/Throat:      Mouth: Mucous membranes are moist.      Pharynx: Oropharynx is clear.   Eyes:      Extraocular Movements: Extraocular movements intact.      Conjunctiva/sclera: Conjunctivae normal.   Cardiovascular:      Rate and Rhythm: Tachycardia present. Rhythm irregular.      Pulses: Normal pulses.      Heart sounds: Normal heart sounds.   Pulmonary:      Effort: Pulmonary effort is normal.      Breath sounds: Normal breath sounds.   Abdominal:      General: Bowel sounds are normal.      Palpations: Abdomen is soft.   Genitourinary:     Comments: Penile swelling with blood  Musculoskeletal:         General: Normal range of motion.      Cervical back: Normal range of motion and  neck supple.   Skin:     General: Skin is warm and dry.   Neurological:      General: No focal deficit present.      Mental Status: He is alert and oriented to person, place, and time. Mental status is at baseline.   Psychiatric:         Mood and Affect: Mood normal.         Behavior: Behavior normal.         Thought Content: Thought content normal.             Significant Labs: All pertinent labs within the past 24 hours have been reviewed.  CBC:   Recent Labs   Lab 05/21/24  0534 05/22/24  0757 05/22/24  1248 05/22/24  1743   WBC 5.84 11.17 11.77  --    HGB 11.5* 8.5* 7.3* 8.7*   HCT 35.3* 26.6* 22.2* 26.2*   * 127* 118*  --      CMP:   Recent Labs   Lab 05/21/24  0534 05/22/24  0757 05/22/24  1247    136 136   K 4.4 7.2* 4.7    100 100   CO2 20* 17* 18*    124* 85   BUN 43* 83* 55*   CREATININE 12.0* 14.9* 10.1*   CALCIUM 10.2 9.0 9.2   PROT 8.4 6.6 6.8   ALBUMIN 3.5 3.0* 3.4*   BILITOT 0.8 0.6 0.5   ALKPHOS 69 52* 48*   AST 9* 49* 175*   ALT 8* 55* 217*   ANIONGAP 15 19* 18*       Troponin:   Recent Labs   Lab 05/22/24  1247   TROPONINI 0.053*       Significant Imaging: I have reviewed all pertinent imaging results/findings within the past 24 hours.

## 2024-05-22 NOTE — ASSESSMENT & PLAN NOTE
- Has been following with Dr. Shirley; failed conservative treatment   - Admitted 5/19 for elective implantation of penile prosthesis, now POD1  - Bleeding and GERARD drain complications overnight; removed and site sutured  - Drop in H/H noted, given 2U PRBC per urology  - Post op plan per urology

## 2024-05-22 NOTE — ASSESSMENT & PLAN NOTE
- S/p mechanical aortic valve replacement back in 2016; follows with Dr. Gudino and at coumadin clinic  - Lovenox bridge started 5/19 on admission but held 5/21 for procedure  - Per chart review, INR goal of 2-3  - Holding currently due to bleeding and drop in H/H; will resume once able  - Will get pharmacy to dose once appropriate

## 2024-05-22 NOTE — HPI
Mr. Whtie is a 54-year-old male with past medical history of diastolic heart failure, ESRD on dialysis (MWF [facility]), Aortic stenosis s/p AVR (2017) on coumadin, Atrial fibrillation, HTN, HLD, drug-induced erectile dysfunction, Hx of hepatitis B, DANIEL who was a direct admit from Dr. Shirley (urology) on 5/19 for elective implantation of penile prosthesis. Hospital medicine consulted for medical management and coumadin to lovenox bridge for procedure. Asymptomatic on presentation. Patient cleared for surgery and underwent insertion of 3-piece inflatable penile prosthesis on 5/21. Yesterday evening, patient began to leak blood at insertion site of GERARD drain and Dr. Shirley stopped bleeding at bedside and redressed site. Consistent amount of blood draining from site this morning (300cc estimate). Multiple clots passed through drain, subsequently removed and site closed with suture. Nephrology also following for continuation of dialysis. Due to drop in H/H, recent bleeding, planned 2U PRBC during dialysis session today. Per dialysis nurse, patient did not feel well at the start of the session. Then experienced multiple vomiting episodes. BP dropped as low as 80/40, Afib RVR noted on monitor with HR in 150s. Patient also complained of chest pain. 2 hours of dialysis completed but no fluid pulled off. Bp did improved post-fluid bolus. CC consulted and patient transferred up to unit with PRBC transfusing. Patient states that he felt this way b/c his po cardizem has not been given to him over the past couple days. In/out afib/RVR on the monitor. Denies any more chest pain, shortness of breath. Does complain of some pain/soreness to penis/groin area. Repeat H/H 7.3/22.2, plts 118, K improved to 4.7 from 7.2, Cr improving to 10.1 (baseline around 14), AST/ALT trending up, trop 0.053.

## 2024-05-22 NOTE — ASSESSMENT & PLAN NOTE
Patient is chronically on statin.will continue for now. Last Lipid Panel:   Lab Results   Component Value Date    CHOL 248 (H) 03/12/2024    HDL 29 (L) 03/12/2024    LDLCALC 170.0 (H) 03/12/2024    TRIG 245 (H) 03/12/2024    CHOLHDL 11.7 (L) 03/12/2024     Plan:  -transaminitis we will hold  -low fat/low calorie diet

## 2024-05-22 NOTE — CONSULTS
[facility] - Intensive Care ([facility])  Critical Care Medicine  Consult Note    Patient Name: Isidro White Jr.  MRN: [id number]  Admission Date: 5/19/2024  Hospital Length of Stay: 3 days  Code Status: Full Code  Attending Physician: Bonifacio Shirley MD   Primary Care Provider: Boston Nevarez MD   Principal Problem: S/p Implantation of penile prosthesis     Subjective:     HPI:  Mr. White is a 54-year-old male with past medical history of diastolic heart failure, ESRD on dialysis (MWF [facility]), Aortic stenosis s/p AVR (2017) on coumadin, Atrial fibrillation, HTN, HLD, drug-induced erectile dysfunction, Hx of hepatitis B, DANIEL who was a direct admit from Dr. Shirley (urology) on 5/19 for elective implantation of penile prosthesis. Hospital medicine consulted for medical management and coumadin to lovenox bridge for procedure. Asymptomatic on presentation. Patient cleared for surgery and underwent insertion of 3-piece inflatable penile prosthesis on 5/21. Yesterday evening, patient began to leak blood at insertion site of GERARD drain and Dr. Shirley stopped bleeding at bedside and redressed site. Consistent amount of blood draining from site this morning (300cc estimate). Multiple clots passed through drain, subsequently removed and site closed with suture. Nephrology also following for continuation of dialysis. Due to drop in H/H, recent bleeding, planned 2U PRBC during dialysis session today. Per dialysis nurse, patient did not feel well at the start of the session. Then experienced multiple vomiting episodes. BP dropped as low as 80/40, Afib RVR noted on monitor with HR in 150s. Patient also complained of chest pain. 2 hours of dialysis completed but no fluid pulled off. Bp did improved post-fluid bolus. CC consulted and patient transferred up to unit with PRBC transfusing. Patient states that he felt this way b/c his po cardizem has not been given to him over the past couple days. In/out afib/RVR on the  monitor. Denies any more chest pain, shortness of breath. Does complain of some pain/soreness to penis/groin area. Repeat H/H 7.3/22.2, plts 118, K improved to 4.7 from 7.2, Cr improving to 10.1 (baseline around 14), AST/ALT trending up, trop 0.053.     Past Medical History:   Diagnosis Date    Anemia     Aortic valve stenosis     s/p mechanical AVR    Atrial fibrillation     Atrial flutter     Cardiomyopathy     CHF (congestive heart failure)     Drug-induced erectile dysfunction     ESRD due to hypertension     HD M, W, F    ESRD on dialysis     GERD (gastroesophageal reflux disease)     Hepatitis B     Hyperlipidemia     Hypertension     Nightmares     Obesity     DANIEL (obstructive sleep apnea) 11/12/2019    Secondary hyperparathyroidism of renal origin     Supraventricular tachycardia     atrial tachycardia    Tachycardia     Valvular regurgitation     AI, TR     Past Surgical History:   Procedure Laterality Date    ASD REPAIR      age 8 [facility]    AV Graft Creation Left 03/2017    CARDIAC CATHETERIZATION      [facility]     CARDIAC VALVE SURGERY  02/08/2017    22 mm Medtronic AV, 28 mm TV Medtronic annuloplaty ring    COLONOSCOPY N/A 8/27/2019    Procedure: COLONOSCOPY;  Surgeon: Addie John MD;  Location: [facility] ENDO;  Service: Endoscopy;  Laterality: N/A;  dialysis pt *needs K*    COLONOSCOPY N/A 9/3/2020    Procedure: COLONOSCOPY-need INR;  Surgeon: Jemma Youngblood MD;  Location: [facility] ENDO;  Service: Endoscopy;  Laterality: N/A;    ESOPHAGOGASTRODUODENOSCOPY N/A 8/27/2019    Procedure: EGD (ESOPHAGOGASTRODUODENOSCOPY);  Surgeon: Addie John MD;  Location: [facility] ENDO;  Service: Endoscopy;  Laterality: N/A;    INSERTION OF INFLATABLE PENILE PROSTHESIS N/A 5/21/2024    Procedure: INSERTION, PENILE PROSTHESIS, INFLATABLE;  Surgeon: Bonifacio Shirley MD;  Location: [facility] OR;  Service: Urology;  Laterality: N/A;    RADIOFREQUENCY ABLATION  03/13/2017    Atrial tachycardia    REMOVAL OF GRAFT Left  7/2/2020    Procedure: REMOVAL, GRAFT;  Surgeon: Sascha Sidhu MD;  Location: [facility] OR;  Service: Peripheral Vascular;  Laterality: Left;    RIGHT HEART CATHETERIZATION Right 8/12/2021    Procedure: INSERTION, CATHETER, RIGHT HEART;  Surgeon: Mariah Pierce MD;  Location: [facility] CATH LAB;  Service: Cardiology;  Laterality: Right;     Review of patient's allergies indicates:  No Known Allergies    Family History       Problem Relation (Age of Onset)    Anesthesia problems Paternal Uncle    Cancer Mother    Diabetes Paternal Aunt, Paternal Aunt    Heart attack Father    Heart failure Paternal Grandmother    Hypertension Paternal Aunt    Leukemia Mother, Paternal Aunt    No Known Problems Sister, Brother, Sister, Sister    Stroke Paternal Aunt    Suicide Paternal Uncle    Valvular heart disease Maternal Aunt     Tobacco Use    Smoking status: Never    Smokeless tobacco: Never   Substance and Sexual Activity    Alcohol use: No     Comment: quit in 2016; does have heavy drinking history; started drinking at age 13; was drinking a 12 pack over the weekend and two 24 ounces of beer a day during the week along with a pint of hard alcohol    Drug use: No    Sexual activity: Not Currently       Review of Systems   Constitutional:  Negative for fatigue and fever.   Respiratory:  Negative for cough, chest tightness and shortness of breath.    Cardiovascular:  Negative for chest pain and palpitations.   Gastrointestinal:  Negative for abdominal pain, blood in stool, nausea and vomiting.   Genitourinary:  Positive for penile pain and penile swelling.   Neurological:  Negative for dizziness, weakness and headaches.   Psychiatric/Behavioral:  Negative for agitation and confusion. The patient is nervous/anxious.      Objective:     Vital Signs (Most Recent):  Temp: 97.7 °F (36.5 °C) (05/22/24 1300)  Pulse: 92 (05/22/24 1300)  Resp: 20 (05/22/24 1300)  BP: (!) 105/55 (05/22/24 1300)  SpO2: 100 % (05/22/24 1300) Vital Signs (24h  Range):  Temp:  [97.4 °F (36.3 °C)-98 °F (36.7 °C)] 97.7 °F (36.5 °C)  Pulse:  [71-94] 92  Resp:  [15-20] 20  SpO2:  [92 %-100 %] 100 %  BP: ()/(39-61) 105/55     Weight: 104.5 kg (230 lb 6.1 oz)  Body mass index is 29.58 kg/m².  Intake/Output Summary (Last 24 hours) at 5/22/2024 1454  Last data filed at 5/22/2024 1130  Gross per 24 hour   Intake 1250 ml   Output 760 ml   Net 490 ml     Physical Exam  Constitutional:       General: He is not in acute distress.     Appearance: He is ill-appearing. He is not diaphoretic.   HENT:      Head: Normocephalic.      Mouth/Throat:      Mouth: Mucous membranes are moist.   Eyes:      Extraocular Movements: Extraocular movements intact.      Pupils: Pupils are equal, round, and reactive to light.   Cardiovascular:      Rate and Rhythm: Normal rate. Rhythm irregular.      Pulses: Normal pulses.   Pulmonary:      Effort: Pulmonary effort is normal.      Breath sounds: No wheezing, rhonchi or rales.   Abdominal:      General: Bowel sounds are normal.      Palpations: Abdomen is soft.      Tenderness: There is no abdominal tenderness. There is no guarding or rebound.   Genitourinary:     Penis: Swelling present.       Comments: Incisions site clean, no bleeding noted  Musculoskeletal:         General: Normal range of motion.      Cervical back: Neck supple.      Right lower leg: No edema.      Left lower leg: No edema.   Skin:     General: Skin is warm.      Capillary Refill: Capillary refill takes less than 2 seconds.   Neurological:      Mental Status: He is alert.     Vents:  Oxygen Concentration (%): 98 (05/21/24 0930)    Lines/Drains/Airways       Central Venous Catheter Line  Duration                  Hemodialysis AV Graft 07/04/20 0701 Left upper arm 1418 days              Peripheral Intravenous Line  Duration                  Peripheral IV - Single Lumen  18 G Anterior;Right Upper Arm -- days                  Significant Labs:    CBC/Anemia Profile:  Recent Labs   Lab  05/21/24  0534 05/22/24  0757 05/22/24  1248   WBC 5.84 11.17 11.77   HGB 11.5* 8.5* 7.3*   HCT 35.3* 26.6* 22.2*   * 127* 118*   MCV 98 102* 100*   RDW 15.9* 16.2* 16.2*     Chemistries:  Recent Labs   Lab 05/21/24  0534 05/22/24  0757 05/22/24  1247    136 136   K 4.4 7.2* 4.7    100 100   CO2 20* 17* 18*   BUN 43* 83* 55*   CREATININE 12.0* 14.9* 10.1*   CALCIUM 10.2 9.0 9.2   ALBUMIN 3.5 3.0* 3.4*   PROT 8.4 6.6 6.8   BILITOT 0.8 0.6 0.5   ALKPHOS 69 52* 48*   ALT 8* 55* 217*   AST 9* 49* 175*     Troponin:   Recent Labs   Lab 05/22/24  1247   TROPONINI 0.053*     Significant Imaging:   I have reviewed all pertinent imaging results/findings within the past 24 hours.  I have reviewed and interpreted all pertinent imaging results/findings within the past 24 hours.    Assessment/Plan:     Cardiac/Vascular  Hyperlipidemia  - Currently holding statin due to bumped LFTs    Atrial fibrillation  - Patient with Paroxysmal (<7 days) atrial fibrillation which is uncontrolled currently with Beta Blocker and Calcium Channel Blocker.   - Patient is currently in sinus rhythm.[id number] Score: The patient doesn't have any registry metric data available.   - HASBLED Score: 2. Anticoagulation indicated. Anticoagulation done with coumadin .  - Afib RVR during dialysis, hypotension; Has been taking home cardizem but did not take this morning because he was not feeling well  - Once transferred to unit, rate has been controlled, patient in NSR; lower dosage of cardizem has been restarted; BB being held  - Continue cardiac monitoring     Chronic diastolic heart failure  - Off CHF pathway  - Recent echo from 5/2 reviewed; ejection fraction by visual approximation is 60%.   - Holding home meds due to episode of hypotension  - Monitor I/Os and daily weights    History of mechanical aortic valve replacement  - S/p mechanical aortic valve replacement back in 2016; follows with Dr. Gudino and at coumadin clinic  - [facility]  bridge started 5/19 on admission but held 5/21 for procedure  - Per chart review, INR goal of 2-3  - Holding currently due to bleeding and drop in H/H; will resume once able  - Will get pharmacy to dose once appropriate    Essential hypertension  - Chronic, uncontrolled. Latest blood pressure and vitals reviewed:  Temp:  [97.4 °F (36.3 °C)-98 °F (36.7 °C)]   Pulse:  [71-94]   Resp:  [14-20]   BP: ()/(39-66)   SpO2:  [92 %-100 %]   - Home meds for hypertension were reviewed and noted below:   Hypertension Medications               amLODIPine (NORVASC) 10 MG tablet Take 1 tablet by mouth daily    diltiaZEM (TIADYLT ER) 420 MG Cs24 Take 1 capsule (420 mg total) by mouth once daily.    hydrALAZINE (APRESOLINE) 100 MG tablet Take 1 tablet by mouth three times a day including dialysis days    labetaloL (NORMODYNE) 200 MG tablet Take 1 tablet (200 mg total) by mouth 3 (three) times daily.    lisinopriL (PRINIVIL,ZESTRIL) 40 MG tablet Take 1 tablet (40 mg total) by mouth once daily.    pep injection Inject 0.3 ml as directed     For compounding pharmacy use:   Add PAPAVERINE 30 mcg  Add PHENTOLAMINE 10 mg  Add ALPROSTADIL 100 mcg   - While in the hospital, will manage blood pressure as follows; Resumed cardizem 30 q6h for HR control; holding all other home meds due to hypotension; will resume if necessary  - Will utilize p.r.n. blood pressure medication only if patient's blood pressure greater than 180/110 and he develops symptoms such as worsening chest pain or shortness of breath.    Renal/  Drug-induced erectile dysfunction  - Has been following with Dr. Shirley; failed conservative treatment   - Admitted 5/19 for elective implantation of penile prosthesis, now POD1  - Bleeding and GERARD drain complications overnight; removed and site sutured  - Drop in H/H noted, given 2U PRBC per urology  - Post op plan per urology     ESRD (end stage renal disease) on dialysis  - Dialysis patient MWF at [facility]  Improving. BMP reviewed - noted Estimated Creatinine Clearance: 10.9 mL/min (A) (based on SCr of 10.1 mg/dL (H)). according to latest data. Based on current GFR, CKD stage is end stage.    - Monitor UOP and serial BMP and adjust therapy as needed. Renally dose meds. Avoid nephrotoxic medications and procedures.  - Nephrology is following    Oncology  Anemia of chronic disease  - Patient's anemia is currently uncontrolled. Has received 2 units of PRBCs on 5/22 . Etiology likely d/t acute blood loss which was from penile prosthesis implantation and chronic disease due to ESRD and chronic liver disease  - Current CBC reviewed:   Lab Results   Component Value Date    HGB 7.3 (L) 05/22/2024    HCT 22.2 (L) 05/22/2024   - Monitor serial CBC and transfuse if patient becomes hemodynamically unstable, symptomatic or H/H drops below 7/21.  - Coumadin held today    GI  GERD (gastroesophageal reflux disease)  - Protonix daily    Chronic hepatitis B  - Chronic liver disease from previous history of hepatitis B   - Hasn't followed with hepatology/transplant in a while, per recent note, not approved for LRD/CAD transplant due to heart disease, pulmonary hypertension, hypoxemia, liver disease of unknown cause.  - AST/ALT trending up with recent labs; holding statin; will trend      Critical Care Daily Checklist:    A: Awake: RASS Goal/Actual Goal:  N/a  Actual:  N/a   B: Spontaneous Breathing Trial Performed?  N/a   C: SAT & SBT Coordinated?  N/a                      D: Delirium: CAM-ICU  N/a   E: Early Mobility Performed? Yes   F: Feeding Goal:    Status:     Current Diet Order   Procedures    Diet Renal Coumadin Restriction; Standard Tray     Order Specific Question:   Additional Diet Options:     Answer:   Coumadin Restriction     Order Specific Question:   Tray type:     Answer:   Standard Tray      AS: Analgesia/Sedation Per urology   T: Thromboembolic Prophylaxis CI   H: HOB > 300 Yes   U: Stress Ulcer Prophylaxis (if needed)  Protonix   G: Glucose Control Controlled   B: Bowel Function  Monitor   I: Indwelling Catheter (Lines & Caldwell) Necessity Reviewed   D: De-escalation of Antimicrobials/Pharmacotherapies Yes    Plan for the day/ETD Hemodynamic monitoring    Code Status:  Family/Goals of Care: Full Code       Critical Care Time: 41 minutes  Critical secondary to Patient has a condition that poses threat to life and bodily function: Acute blood loss anemia, Hypotension, Afib w/ RVR     Critical care was time spent personally by me on the following activities: development of treatment plan with patient or surrogate and bedside caregivers, discussions with consultants, evaluation of patient's response to treatment, examination of patient, ordering and performing treatments and interventions, ordering and review of laboratory studies, ordering and review of radiographic studies, pulse oximetry, re-evaluation of patient's condition. This critical care time did not overlap with that of any other provider or involve time for any procedures.    Thank you for your consult. I will follow-up with patient. Please contact us if you have any additional questions.     Andrew Darling PA-C  Critical Care Medicine  [facility] - Intensive Care ([facility])

## 2024-05-22 NOTE — ASSESSMENT & PLAN NOTE
- Off CHF pathway  - Recent echo from 5/2 reviewed; ejection fraction by visual approximation is 60%.   - Holding home meds due to episode of hypotension  - Monitor I/Os and daily weights

## 2024-05-22 NOTE — ASSESSMENT & PLAN NOTE
- Dialysis patient MWF at [facility]  -[facility]. BMP reviewed - noted Estimated Creatinine Clearance: 10.9 mL/min (A) (based on SCr of 10.1 mg/dL (H)). according to latest data. Based on current GFR, CKD stage is end stage.    - Monitor UOP and serial BMP and adjust therapy as needed. Renally dose meds. Avoid nephrotoxic medications and procedures.  - Nephrology is following

## 2024-05-22 NOTE — SUBJECTIVE & OBJECTIVE
Past Medical History:   Diagnosis Date    Anemia     Aortic valve stenosis     s/p mechanical AVR    Atrial fibrillation     Atrial flutter     Cardiomyopathy     CHF (congestive heart failure)     Drug-induced erectile dysfunction     ESRD due to hypertension     HD M, W, F    ESRD on dialysis     GERD (gastroesophageal reflux disease)     Hepatitis B     Hyperlipidemia     Hypertension     Nightmares     Obesity     DANIEL (obstructive sleep apnea) 11/12/2019    Secondary hyperparathyroidism of renal origin     Supraventricular tachycardia     atrial tachycardia    Tachycardia     Valvular regurgitation     AI, TR     Past Surgical History:   Procedure Laterality Date    ASD REPAIR      age 8 [facility]    AV Graft Creation Left 03/2017    CARDIAC CATHETERIZATION      [facility]     CARDIAC VALVE SURGERY  02/08/2017    22 mm Medtronic AV, 28 mm TV Medtronic annuloplaty ring    COLONOSCOPY N/A 8/27/2019    Procedure: COLONOSCOPY;  Surgeon: Addie John MD;  Location: [facility] ENDO;  Service: Endoscopy;  Laterality: N/A;  dialysis pt *needs K*    COLONOSCOPY N/A 9/3/2020    Procedure: COLONOSCOPY-need INR;  Surgeon: Jemma Youngblood MD;  Location: [facility] ENDO;  Service: Endoscopy;  Laterality: N/A;    ESOPHAGOGASTRODUODENOSCOPY N/A 8/27/2019    Procedure: EGD (ESOPHAGOGASTRODUODENOSCOPY);  Surgeon: Addie John MD;  Location: [facility] ENDO;  Service: Endoscopy;  Laterality: N/A;    INSERTION OF INFLATABLE PENILE PROSTHESIS N/A 5/21/2024    Procedure: INSERTION, PENILE PROSTHESIS, INFLATABLE;  Surgeon: Bonifacio Shirley MD;  Location: [facility] OR;  Service: Urology;  Laterality: N/A;    RADIOFREQUENCY ABLATION  03/13/2017    Atrial tachycardia    REMOVAL OF GRAFT Left 7/2/2020    Procedure: REMOVAL, GRAFT;  Surgeon: Sascha Sidhu MD;  Location: [facility] OR;  Service: Peripheral Vascular;  Laterality: Left;    RIGHT HEART CATHETERIZATION Right 8/12/2021    Procedure: INSERTION, CATHETER, RIGHT HEART;  Surgeon: Mariah HENRIQUEZ  MD Kari;  Location: [facility] CATH LAB;  Service: Cardiology;  Laterality: Right;     Review of patient's allergies indicates:  No Known Allergies    Family History       Problem Relation (Age of Onset)    Anesthesia problems Paternal Uncle    Cancer Mother    Diabetes Paternal Aunt, Paternal Aunt    Heart attack Father    Heart failure Paternal Grandmother    Hypertension Paternal Aunt    Leukemia Mother, Paternal Aunt    No Known Problems Sister, Brother, Sister, Sister    Stroke Paternal Aunt    Suicide Paternal Uncle    Valvular heart disease Maternal Aunt     Tobacco Use    Smoking status: Never    Smokeless tobacco: Never   Substance and Sexual Activity    Alcohol use: No     Comment: quit in 2016; does have heavy drinking history; started drinking at age 13; was drinking a 12 pack over the weekend and two 24 ounces of beer a day during the week along with a pint of hard alcohol    Drug use: No    Sexual activity: Not Currently       Review of Systems   Constitutional:  Negative for fatigue and fever.   Respiratory:  Negative for cough, chest tightness and shortness of breath.    Cardiovascular:  Negative for chest pain and palpitations.   Gastrointestinal:  Negative for abdominal pain, blood in stool, nausea and vomiting.   Genitourinary:  Positive for penile pain and penile swelling.   Neurological:  Negative for dizziness, weakness and headaches.   Psychiatric/Behavioral:  Negative for agitation and confusion. The patient is nervous/anxious.      Objective:     Vital Signs (Most Recent):  Temp: 97.7 °F (36.5 °C) (05/22/24 1300)  Pulse: 92 (05/22/24 1300)  Resp: 20 (05/22/24 1300)  BP: (!) 105/55 (05/22/24 1300)  SpO2: 100 % (05/22/24 1300) Vital Signs (24h Range):  Temp:  [97.4 °F (36.3 °C)-98 °F (36.7 °C)] 97.7 °F (36.5 °C)  Pulse:  [71-94] 92  Resp:  [15-20] 20  SpO2:  [92 %-100 %] 100 %  BP: ()/(39-61) 105/55     Weight: 104.5 kg (230 lb 6.1 oz)  Body mass index is 29.58 kg/m².  Intake/Output Summary  (Last 24 hours) at 5/22/2024 1454  Last data filed at 5/22/2024 1130  Gross per 24 hour   Intake 1250 ml   Output 760 ml   Net 490 ml     Physical Exam  Constitutional:       General: He is not in acute distress.     Appearance: He is ill-appearing. He is not diaphoretic.   HENT:      Head: Normocephalic.      Mouth/Throat:      Mouth: Mucous membranes are moist.   Eyes:      Extraocular Movements: Extraocular movements intact.      Pupils: Pupils are equal, round, and reactive to light.   Cardiovascular:      Rate and Rhythm: Normal rate. Rhythm irregular.      Pulses: Normal pulses.   Pulmonary:      Effort: Pulmonary effort is normal.      Breath sounds: No wheezing, rhonchi or rales.   Abdominal:      General: Bowel sounds are normal.      Palpations: Abdomen is soft.      Tenderness: There is no abdominal tenderness. There is no guarding or rebound.   Genitourinary:     Penis: Swelling present.       Comments: Incisions site clean, no bleeding noted  Musculoskeletal:         General: Normal range of motion.      Cervical back: Neck supple.      Right lower leg: No edema.      Left lower leg: No edema.   Skin:     General: Skin is warm.      Capillary Refill: Capillary refill takes less than 2 seconds.   Neurological:      Mental Status: He is alert.     Vents:  Oxygen Concentration (%): 98 (05/21/24 0930)    Lines/Drains/Airways       Central Venous Catheter Line  Duration                  Hemodialysis AV Graft 07/04/20 0701 Left upper arm 1418 days              Peripheral Intravenous Line  Duration                  Peripheral IV - Single Lumen  18 G Anterior;Right Upper Arm -- days                  Significant Labs:    CBC/Anemia Profile:  Recent Labs   Lab 05/21/24  0534 05/22/24  0757 05/22/24  1248   WBC 5.84 11.17 11.77   HGB 11.5* 8.5* 7.3*   HCT 35.3* 26.6* 22.2*   * 127* 118*   MCV 98 102* 100*   RDW 15.9* 16.2* 16.2*     Chemistries:  Recent Labs   Lab 05/21/24  0534 05/22/24  0750  05/22/24  1247    136 136   K 4.4 7.2* 4.7    100 100   CO2 20* 17* 18*   BUN 43* 83* 55*   CREATININE 12.0* 14.9* 10.1*   CALCIUM 10.2 9.0 9.2   ALBUMIN 3.5 3.0* 3.4*   PROT 8.4 6.6 6.8   BILITOT 0.8 0.6 0.5   ALKPHOS 69 52* 48*   ALT 8* 55* 217*   AST 9* 49* 175*     Troponin:   Recent Labs   Lab 05/22/24  1247   TROPONINI 0.053*     Significant Imaging:   I have reviewed all pertinent imaging results/findings within the past 24 hours.  I have reviewed and interpreted all pertinent imaging results/findings within the past 24 hours.

## 2024-05-22 NOTE — ASSESSMENT & PLAN NOTE
- Patient with Paroxysmal (<7 days) atrial fibrillation which is uncontrolled currently with Beta Blocker and Calcium Channel Blocker.   - Patient is currently in sinus rhythm.[id number] Score: The patient doesn't have any registry metric data available.   - HASBLED Score: 2. Anticoagulation indicated. Anticoagulation done with coumadin .  - Afib RVR during dialysis, hypotension; Has been taking home cardizem but did not take this morning because he was not feeling well  - Once transferred to unit, rate has been controlled, patient in NSR; lower dosage of cardizem has been restarted; BB being held  - Continue cardiac monitoring

## 2024-05-22 NOTE — NURSING
Pt dressing was reinforced 2x with pressure on the shift. Old drainage is noted to surgical dressing and scrotal area. Patient has passed multiple clots through GERARD drain estimating at 100ml. No further actions at this time. Will continue to monitor.

## 2024-05-22 NOTE — PROGRESS NOTES
Pharmacy Consult Note: Warfarin     Isidro Sheriffteresita Diaz 's Coumadin will be dosed and monitored by Pharmacy.      Target INR goal is 2-3     INR   Date Value Ref Range Status   05/22/2024 1.4 (H) 0.8 - 1.2 Final     Comment:     Coumadin Therapy:  2.0 - 3.0 for INR for all indicators except mechanical heart valves  and antiphospholipid syndromes which should use 2.5 - 3.5.         Indication: afib, hx of MAVR   Home dose: 7.5 mg MWF & 11.25 mg all other days.     Dr Shirley wants to HOLD today's dose today due to drop in H/H.     Minor drug interactions: acetaminophen, cefazolin, and levofloxacin can enhance INR effects.     PT/INR will be monitored daily. Dose adjustments will be made accordingly.      Thank you for allowing us to participate in this patient's care.     Ana Garcia 5/22/2024 9:30 AM

## 2024-05-22 NOTE — ASSESSMENT & PLAN NOTE
- Chronic, uncontrolled. Latest blood pressure and vitals reviewed:  Temp:  [97.4 °F (36.3 °C)-98 °F (36.7 °C)]   Pulse:  [71-94]   Resp:  [14-20]   BP: ()/(39-66)   SpO2:  [92 %-100 %]   - Home meds for hypertension were reviewed and noted below:   Hypertension Medications               amLODIPine (NORVASC) 10 MG tablet Take 1 tablet by mouth daily    diltiaZEM (TIADYLT ER) 420 MG Cs24 Take 1 capsule (420 mg total) by mouth once daily.    hydrALAZINE (APRESOLINE) 100 MG tablet Take 1 tablet by mouth three times a day including dialysis days    labetaloL (NORMODYNE) 200 MG tablet Take 1 tablet (200 mg total) by mouth 3 (three) times daily.    lisinopriL (PRINIVIL,ZESTRIL) 40 MG tablet Take 1 tablet (40 mg total) by mouth once daily.    pep injection Inject 0.3 ml as directed     For compounding pharmacy use:   Add PAPAVERINE 30 mcg  Add PHENTOLAMINE 10 mg  Add ALPROSTADIL 100 mcg   - While in the hospital, will manage blood pressure as follows; Resumed cardizem 30 q6h for HR control; holding all other home meds due to hypotension; will resume if necessary  - Will utilize p.r.n. blood pressure medication only if patient's blood pressure greater than 180/110 and he develops symptoms such as worsening chest pain or shortness of breath.

## 2024-05-22 NOTE — NURSING
Spoke with Dr Chang at patient's bedside. MD stated patient did not have a reaction to blood products and insist that PRBCs be given. Transfusion restarted per doctor directive. Informed patient of possible side effects and instructed him to call nurse if he suspects any reaction or has any unusable symptoms. Pt verbalized understanding, call light in hand.

## 2024-05-22 NOTE — NURSING
05/22/24 0915   Pre-Hemodialysis Assessment   Pre-Hemodialysis Comments On arrival to room.  Pt states he does not feel well.  Pt started vomitting.  About 400-500ml of Vomit noted.  Pts Bp low also  80/40 Hr 80 . New labs resulted.  K-7.2.   Dr Reis paged and updated.  Primary nurse made aware.  Orders for pretx bolus from [place].  500ml nacl.  Dr. Hu at bedside.  BP better post bolus.  98/56. hr/80.  Pt awake and alert.  Dialysis started.  No UF.  2 k2.5ca bath.

## 2024-05-22 NOTE — ASSESSMENT & PLAN NOTE
Patient is identified as having Diastolic (HFpEF) heart failure that is Chronic. CHF is currently controlled. Latest ECHO performed and demonstrates- Results for orders placed during the hospital encounter of 05/02/24    Echo    Interpretation Summary    Left Ventricle: The left ventricle is normal in size. Normal wall thickness. There is concentric remodeling. Normal wall motion. Ejection fraction by visual approximation is 60%. Grade II diastolic dysfunction.    Right Ventricle: Normal right ventricular cavity size. Wall thickness is normal. Systolic function is normal.    Left Atrium: Left atrium is severely dilated.    Aortic Valve: There is a tilting disc prosthetic valve in the aortic position.    Mitral Valve: Moderately calcified leaflets. Moderately restricted motion. There is mild to moderate stenosis. MVA 2.6 cm2 by planimetry, 1.6 cm2 by PHT.  The mean pressure gradient across the mitral valve is 11 mmHg at a heart rate of  bpm. There is mild regurgitation.    Tricuspid Valve: There is mild to moderate regurgitation.    Pulmonary Artery: There is pulmonary hypertension. The estimated pulmonary artery systolic pressure is 70 mmHg.    IVC/SVC: Normal venous pressure at 3 mmHg.   Place on fluid restriction of 1.5 L. Continue to stress to patient importance of self efficacy and  on diet for CHF.

## 2024-05-22 NOTE — PROGRESS NOTES
[facility] - Intensive Care ([facility] Medicine  Progress Note    Patient Name: Isidro White Jr.  MRN: [id number]  Patient Class: IP- Inpatient   Admission Date: 5/19/2024  Length of Stay: 3 days  Attending Physician: Bonifacio Shirley MD  Primary Care Provider: Boston Nevarez MD        Subjective:     Principal Problem:Drug-induced erectile dysfunction        HPI:  Isidro White Jr. is a 54 y.o. 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  Presented as a direct admit from Dr. Shirley with urology for scheduled penile prosthesis procedure.  Hospital Medicine consulted to manage medical problems and to bridge Lovenox and Coumadin for procedure.  Patient denies any complaints at this time.    PCP: Boston Nevarez    Overview/Hospital Course:  Patient is a 51-year-old male with a history of end-stage renal disease, anemia, aortic valve stenosis, AFib flutter, CHF, GERD, chronic hep B, hypertension, DANIEL, secondary hyperparathyroidism who presented as a direct admit from the urology service for scheduled penile prosthesis.  Patient was on Coumadin due to his mechanical valve and therefore he is undergoing Lovenox Coumadin bridge for procedure.    Underwent procedure 5/21 without complications.  Evening of surgery patient began to have bleeding from the insertion site of the GERARD drain.  Urology stopped the bleeding at bedside and redressed it.  He continued to have blood draining approximately 300 cc with clots past.  A drain was  subsequently removed and the site was closed with a suture per Urology.  Patient reports feeling bad on morning of 5/22.  Blood pressure was down.  He had episode of vomiting.  Labs returned with a 3 point drop in hemoglobin and potassium 7.2.  Patient was initiated on hemodialysis received 1000 cc of saline and 25 g of albumin.  Patient's blood pressure stabilized about 90.  However he went into AFib with RVR.  He was given a small dose of metoprolol and heart rate was not controlled.  Dialysis was discontinued after 2 hours.  And patient was transferred to the ICU for closer monitoring.  In ICU patient received 2 units of packed blood cells.  Repeat labs revealed a potassium before 0.7 hemoglobin of 7.3.  Antihypertensives were held with the exception of restarting Cardizem 30 mg q.6 hours.    Interval History:  Patient seen and examined at bedside.  He was feeling bad with low blood pressure and rapid heart rate.    Review of Systems   Constitutional:  Positive for diaphoresis and fatigue.   Respiratory:  Negative for chest tightness.    Cardiovascular:  Positive for chest pain and palpitations.   Gastrointestinal:  Positive for abdominal distention and vomiting.   Genitourinary:  Positive for penile pain and penile swelling.   All other systems reviewed and are negative.    Objective:     Vital Signs (Most Recent):  Temp: 98.2 °F (36.8 °C) (05/22/24 1645)  Pulse: 89 (05/22/24 1645)  Resp: 14 (05/22/24 1645)  BP: (!) 110/55 (05/22/24 1645)  SpO2: 100 % (05/22/24 1645) Vital Signs (24h Range):  Temp:  [97.4 °F (36.3 °C)-98.2 °F (36.8 °C)] 98.2 °F (36.8 °C)  Pulse:  [71-94] 89  Resp:  [14-20] 14  SpO2:  [94 %-100 %] 100 %  BP: ()/(39-66) 110/55     Weight: 104.5 kg (230 lb 6.1 oz)  Body mass index is 29.58 kg/m².    Intake/Output Summary (Last 24 hours) at 5/22/2024 5096  Last data filed at 5/22/2024 1130  Gross per 24 hour   Intake 1250 ml   Output 730 ml   Net 520 ml         Physical Exam  Vitals reviewed.    Constitutional:       Appearance: He is ill-appearing.   HENT:      Head: Normocephalic and atraumatic.      Mouth/Throat:      Mouth: Mucous membranes are moist.      Pharynx: Oropharynx is clear.   Eyes:      Extraocular Movements: Extraocular movements intact.      Conjunctiva/sclera: Conjunctivae normal.   Cardiovascular:      Rate and Rhythm: Tachycardia present. Rhythm irregular.      Pulses: Normal pulses.      Heart sounds: Normal heart sounds.   Pulmonary:      Effort: Pulmonary effort is normal.      Breath sounds: Normal breath sounds.   Abdominal:      General: Bowel sounds are normal.      Palpations: Abdomen is soft.   Genitourinary:     Comments: Penile swelling with blood  Musculoskeletal:         General: Normal range of motion.      Cervical back: Normal range of motion and neck supple.   Skin:     General: Skin is warm and dry.   Neurological:      General: No focal deficit present.      Mental Status: He is alert and oriented to person, place, and time. Mental status is at baseline.   Psychiatric:         Mood and Affect: Mood normal.         Behavior: Behavior normal.         Thought Content: Thought content normal.             Significant Labs: All pertinent labs within the past 24 hours have been reviewed.  CBC:   Recent Labs   Lab 05/21/24  0534 05/22/24  0757 05/22/24  1248 05/22/24  1743   WBC 5.84 11.17 11.77  --    HGB 11.5* 8.5* 7.3* 8.7*   HCT 35.3* 26.6* 22.2* 26.2*   * 127* 118*  --      CMP:   Recent Labs   Lab 05/21/24  0534 05/22/24  0757 05/22/24  1247    136 136   K 4.4 7.2* 4.7    100 100   CO2 20* 17* 18*    124* 85   BUN 43* 83* 55*   CREATININE 12.0* 14.9* 10.1*   CALCIUM 10.2 9.0 9.2   PROT 8.4 6.6 6.8   ALBUMIN 3.5 3.0* 3.4*   BILITOT 0.8 0.6 0.5   ALKPHOS 69 52* 48*   AST 9* 49* 175*   ALT 8* 55* 217*   ANIONGAP 15 19* 18*       Troponin:   Recent Labs   Lab 05/22/24  1247   TROPONINI 0.053*       Significant Imaging: I have reviewed all  pertinent imaging results/findings within the past 24 hours.    Assessment/Plan:      * Drug-induced erectile dysfunction        Transaminitis  Likely secondary to relative hypotensive episode.  We will statin and recheck in a.m..      Anemia of chronic disease  Patient's anemia is currently controlled. Has received 2 units of PRBCs on 5/22 . Etiology likely d/t acute blood loss which was from penile prosthesis site  Current CBC reviewed-   Lab Results   Component Value Date    HGB 8.7 (L) 05/22/2024    HCT 26.2 (L) 05/22/2024     Monitor serial CBC and transfuse if patient becomes hemodynamically unstable, symptomatic or H/H drops below 7/21.    GERD (gastroesophageal reflux disease)  Chronic. Stable. Currently asymptomatic. Home medications include PPI/Antacids as needed.  Plan:  -Continue PPI/Antacids as needed         Preoperative clearance  Admitted for heparin bridge from coumadin for scheduled penile prosthesis surgery to be performed by Dr. Shirley.  RCRI revealed class 4 risk with 15.0% 30 day risk of death, mi, cardiac arrest.  Plan:  -pre-op labs and imaging  -lovenox bridge for coumadin  -manage co-morbidities   -cards consult for surgical clearance  -urology primary team      Hyperlipidemia  Patient is chronically on statin.will continue for now. Last Lipid Panel:   Lab Results   Component Value Date    CHOL 248 (H) 03/12/2024    HDL 29 (L) 03/12/2024    LDLCALC 170.0 (H) 03/12/2024    TRIG 245 (H) 03/12/2024    CHOLHDL 11.7 (L) 03/12/2024     Plan:  -transaminitis we will hold  -low fat/low calorie diet        Chronic hepatitis B  Patient has chronic liver disease due to hepatitis b. Their liver disease is compensated.       Atrial fibrillation  Patient with Paroxysmal (<7 days) atrial fibrillation which is controlled currently with Beta Blocker and Calcium Channel Blocker. Patient is currently in sinus rhythm. Anticoagulation indicated. Anticoagulation done with coumadin. Bridging with lovenox for   penile prosthesis surgery .    Chronic diastolic heart failure  Patient is identified as having Diastolic (HFpEF) heart failure that is Chronic. CHF is currently controlled. Latest ECHO performed and demonstrates- Results for orders placed during the hospital encounter of 05/02/24    Echo    Interpretation Summary    Left Ventricle: The left ventricle is normal in size. Normal wall thickness. There is concentric remodeling. Normal wall motion. Ejection fraction by visual approximation is 60%. Grade II diastolic dysfunction.    Right Ventricle: Normal right ventricular cavity size. Wall thickness is normal. Systolic function is normal.    Left Atrium: Left atrium is severely dilated.    Aortic Valve: There is a tilting disc prosthetic valve in the aortic position.    Mitral Valve: Moderately calcified leaflets. Moderately restricted motion. There is mild to moderate stenosis. MVA 2.6 cm2 by planimetry, 1.6 cm2 by PHT.  The mean pressure gradient across the mitral valve is 11 mmHg at a heart rate of  bpm. There is mild regurgitation.    Tricuspid Valve: There is mild to moderate regurgitation.    Pulmonary Artery: There is pulmonary hypertension. The estimated pulmonary artery systolic pressure is 70 mmHg.    IVC/SVC: Normal venous pressure at 3 mmHg.   Place on fluid restriction of 1.5 L. Continue to stress to patient importance of self efficacy and  on diet for CHF.             ESRD (end stage renal disease) on dialysis   Nephrology consult for HD while in hospital.  Usual chronic maintenance hemodialysis Monday Wednesday Friday [facility]    History of mechanical aortic valve replacement  Plan:  -continue anticoagulation therpay with  Lovenox while in hospital  -tele monitoring  -cards consulted for surgical clearance      Essential hypertension  Chronic, controlled. Latest blood pressure and vitals reviewed-     Temp:  [97.4 °F (36.3 °C)-98.2 °F (36.8 °C)]   Pulse:  [71-94]   Resp:  [14-20]   BP:  ()/(39-66)   SpO2:  [94 %-100 %] .   Home meds for hypertension were reviewed and noted below.   Hypertension Medications               amLODIPine (NORVASC) 10 MG tablet Take 1 tablet by mouth daily    diltiaZEM (TIADYLT ER) 420 MG Cs24 Take 1 capsule (420 mg total) by mouth once daily.    hydrALAZINE (APRESOLINE) 100 MG tablet Take 1 tablet by mouth three times a day including dialysis days    labetaloL (NORMODYNE) 200 MG tablet Take 1 tablet (200 mg total) by mouth 3 (three) times daily.    lisinopriL (PRINIVIL,ZESTRIL) 40 MG tablet Take 1 tablet (40 mg total) by mouth once daily.                 While in the hospital, will manage blood pressure as follows; postop hypotension we will hold all meds with the exception of Cardizem 30 mg q.6 hours    Will utilize p.r.n. blood pressure medication only if patient's blood pressure greater than 180/100 and he develops symptoms such as worsening chest pain or shortness of breath.      VTE Risk Mitigation (From admission, onward)           Ordered     warfarin (COUMADIN) tablet 7.5 mg  Every Mon, Wed, Fri 05/22/24 0929     warfarin split tablet 11.25 mg  Once per day on Sunday Tuesday Thursday Saturday 05/21/24 1341     IP VTE HIGH RISK PATIENT  Once         05/19/24 2103     Place sequential compression device  Until discontinued         05/19/24 2103     Reason for No Pharmacological VTE Prophylaxis  Once        Question:  Reasons:  Answer:  Physician Provided (leave comment)  Comment:  pending surgical intervention    05/19/24 2103                    Discharge Planning   SARBJIT:      Code Status: Full Code   Is the patient medically ready for discharge?:     Reason for patient still in hospital (select all that apply): Patient unstable, Patient new problem, Patient trending condition, Laboratory test, Treatment, and Consult recommendations  Discharge Plan A: Home            Critical care time spent on the evaluation and treatment of severe organ  dysfunction, review of pertinent labs and imaging studies, discussions with consulting providers and discussions with patient/family: 45 minutes.      Federica Hu MD  Department of Hospital Medicine   '[place] - Intensive Care ([facility])

## 2024-05-22 NOTE — PROGRESS NOTES
Nephrology Progress Note     History of Present Illness     hx of end-stage renal disease presented to the ER directly admitted from Urology office for a scheduled penile prosthesis procedure.  Hospital Medicine was consulted to manage medical problems and to bridge with Lovenox and Coumadin for procedure.     His last dialysis outpatient was on Friday 05/17/2024.     Nephrology consulted to provide routine dialysis while here.        Interval History   Overnight/currently: seen on HD. BP low and tachy. Getting PRBC. Having mild chest pain.         Allergies:    has No Known Allergies.    Current medications:   Scheduled Meds:   atorvastatin  40 mg Oral Daily    calcium acetate(phosphat bind)  2,001 mg Oral TID WM    ceFAZolin (Ancef) IV (PEDS and ADULTS)  1 g Intravenous Q24H    diltiaZEM  420 mg Oral Daily    ferrous sulfate  1 tablet Oral Daily    hydrALAZINE  100 mg Oral Q8H    labetaloL  200 mg Oral TID    [START ON 5/23/2024] levoFLOXacin  500 mg Oral Every other day    lisinopriL  40 mg Oral Daily    multivitamin  1 tablet Oral Daily    mupirocin   Nasal BID    pantoprazole  40 mg Oral Daily    [START ON 5/24/2024] warfarin  7.5 mg Oral Every Mon, Wed, Fri    warfarin  11.25 mg Oral Once per day on Sunday Tuesday Thursday Saturday     Continuous Infusions:  PRN Meds:.  Current Facility-Administered Medications:     0.9%  NaCl infusion (for blood administration), , Intravenous, Q24H PRN    acetaminophen, 650 mg, Rectal, Q6H PRN    acetaminophen, 650 mg, Oral, Q8H PRN    albumin human 25%, 25 g, Intravenous, PRN    albuterol-ipratropium, 3 mL, Nebulization, Q6H PRN    aluminum-magnesium hydroxide-simethicone, 30 mL, Oral, QID PRN    dextrose 10%, 12.5 g, Intravenous, PRN    dextrose 10%, 25 g, Intravenous, PRN    glucagon (human recombinant), 1 mg, Intramuscular, PRN    glucose, 16 g, Oral, PRN    glucose, 24 g, Oral, PRN    HYDROcodone-acetaminophen, 1 tablet, Oral, Q6H PRN    melatonin, 6 mg, Oral, Nightly  "PRN    metoprolol, 5 mg, Intravenous, Q5 Min PRN    morphine, 2 mg, Intravenous, Q4H PRN    naloxone, 0.02 mg, Intravenous, PRN    ondansetron, 4 mg, Intravenous, Q8H PRN    promethazine, 25 mg, Oral, Q6H PRN    senna-docusate 8.6-50 mg, 1 tablet, Oral, BID PRN    sodium chloride 0.9%, 250 mL, Intravenous, PRN    sodium chloride 0.9%, 10 mL, Intravenous, Q12H PRN     Physical Examination     VS/Measurements    BP (!) 90/39   Pulse 76   Temp 97.4 °F (36.3 °C) (Axillary)   Resp 18   Ht 6' 2" (1.88 m)   Wt 104.5 kg (230 lb 6.1 oz)   SpO2 100%   BMI 29.58 kg/m²         General:  Moderately built, not in distress.  Neck:  Supple,   Respiratory: Non-labored,  Lungs are clear to auscultation.    Cardiovascular:  tachy  Abdomen:  Soft, Non-tender, Normal bowel sounds.   Muskuloskeletal:  No pedal edema          Laboratory Results   Today's Lab Results :    Recent Results (from the past 24 hour(s))   Comprehensive Metabolic Panel (CMP)    Collection Time: 05/22/24  7:57 AM   Result Value Ref Range    Sodium 136 136 - 145 mmol/L    Potassium 7.2 (HH) 3.5 - 5.1 mmol/L    Chloride 100 95 - 110 mmol/L    CO2 17 (L) 23 - 29 mmol/L    Glucose 124 (H) 70 - 110 mg/dL    BUN 83 (H) 6 - 20 mg/dL    Creatinine 14.9 (H) 0.5 - 1.4 mg/dL    Calcium 9.0 8.7 - 10.5 mg/dL    Total Protein 6.6 6.0 - 8.4 g/dL    Albumin 3.0 (L) 3.5 - 5.2 g/dL    Total Bilirubin 0.6 0.1 - 1.0 mg/dL    Alkaline Phosphatase 52 (L) 55 - 135 U/L    AST 49 (H) 10 - 40 U/L    ALT 55 (H) 10 - 44 U/L    eGFR 4 (A) >60 mL/min/1.73 m^2    Anion Gap 19 (H) 8 - 16 mmol/L   CBC with Automated Differential    Collection Time: 05/22/24  7:57 AM   Result Value Ref Range    WBC 11.17 3.90 - 12.70 K/uL    RBC 2.62 (L) 4.60 - 6.20 M/uL    Hemoglobin 8.5 (L) 14.0 - 18.0 g/dL    Hematocrit 26.6 (L) 40.0 - 54.0 %     (H) 82 - 98 fL    MCH 32.4 (H) 27.0 - 31.0 pg    MCHC 32.0 32.0 - 36.0 g/dL    RDW 16.2 (H) 11.5 - 14.5 %    Platelets 127 (L) 150 - 450 K/uL    MPV 11.4 " 9.2 - 12.9 fL    Immature Granulocytes 0.3 0.0 - 0.5 %    Gran # (ANC) 9.0 (H) 1.8 - 7.7 K/uL    Immature Grans (Abs) 0.03 0.00 - 0.04 K/uL    Lymph # 1.0 1.0 - 4.8 K/uL    Mono # 1.1 (H) 0.3 - 1.0 K/uL    Eos # 0.0 0.0 - 0.5 K/uL    Baso # 0.01 0.00 - 0.20 K/uL    nRBC 0 0 /100 WBC    Gran % 80.8 (H) 38.0 - 73.0 %    Lymph % 9.0 (L) 18.0 - 48.0 %    Mono % 9.8 4.0 - 15.0 %    Eosinophil % 0.0 0.0 - 8.0 %    Basophil % 0.1 0.0 - 1.9 %    Differential Method Automated    Protime-INR    Collection Time: 05/22/24  7:57 AM   Result Value Ref Range    Prothrombin Time 16.3 (H) 9.0 - 12.5 sec    INR 1.4 (H) 0.8 - 1.2   Prepare RBC 2 Units; post op bleeding    Collection Time: 05/22/24  9:38 AM   Result Value Ref Range    UNIT NUMBER [id number]     Product Code F6129Q82     DISPENSE STATUS ISSUED     CODING SYSTEM JCNZ508     Unit Blood Type Code 5100     Unit Blood Type O POS     Unit Expiration [id number]     CROSSMATCH INTERPRETATION Compatible     UNIT NUMBER [id number]     Product Code T0031Z56     DISPENSE STATUS CROSSMATCHED     CODING SYSTEM PFJL400     Unit Blood Type Code 5100     Unit Blood Type O POS     Unit Expiration [id number]     CROSSMATCH INTERPRETATION Compatible    Type & Screen    Collection Time: 05/22/24  9:38 AM   Result Value Ref Range    Group & Rh O POS     Indirect [place] NEG     Specimen Outdate 05/25/2024 23:59        LABS:  Reviewed Yes      Intake/Output Summary (Last 24 hours) at 5/22/2024 1203  Last data filed at 5/22/2024 0355  Gross per 24 hour   Intake --   Output 760 ml   Net -760 ml       Assessment and Plan     End-stage renal disease, Monday Wednesday Friday at [facility] on [address]-  --hep B, bedside today    --given tachycardia and CP, will move him to ICU     Erectile dysfunction   -admitted for penile prosthesis surgery by Urology     History of mechanical aortic valve replacement  -Lovenox in the hospital     Chronic diastolic heart failure    Atrial fibrillation     --check EKG, give lopressor 5mg IV now    -last ejection fraction 60% with grade 2 diastolic dysfunction     Hypertension with chronic kidney disease   -controlled on current meds      Anemia of chronic kidney disease     --lost blood with surgery and getting PRBC today     Chronic hepatitis-B  -bedside dialysis for future treatments     Secondary hyperparathyroidism of renal origin        ________________________________________________  Braxton Mcneil

## 2024-05-22 NOTE — ASSESSMENT & PLAN NOTE
Patient with Paroxysmal (<7 days) atrial fibrillation which is controlled currently with Beta Blocker and Calcium Channel Blocker. Patient is currently in sinus rhythm. Anticoagulation indicated. Anticoagulation done with coumadin. Bridging with lovenox for  penile prosthesis surgery .

## 2024-05-22 NOTE — ASSESSMENT & PLAN NOTE
Patient's anemia is currently controlled. Has received 2 units of PRBCs on 5/22 . Etiology likely d/t acute blood loss which was from penile prosthesis site  Current CBC reviewed-   Lab Results   Component Value Date    HGB 8.7 (L) 05/22/2024    HCT 26.2 (L) 05/22/2024     Monitor serial CBC and transfuse if patient becomes hemodynamically unstable, symptomatic or H/H drops below 7/21.

## 2024-05-22 NOTE — ASSESSMENT & PLAN NOTE
Chronic, controlled. Latest blood pressure and vitals reviewed-     Temp:  [97.4 °F (36.3 °C)-98.2 °F (36.8 °C)]   Pulse:  [71-94]   Resp:  [14-20]   BP: ()/(39-66)   SpO2:  [94 %-100 %] .   Home meds for hypertension were reviewed and noted below.   Hypertension Medications               amLODIPine (NORVASC) 10 MG tablet Take 1 tablet by mouth daily    diltiaZEM (TIADYLT ER) 420 MG Cs24 Take 1 capsule (420 mg total) by mouth once daily.    hydrALAZINE (APRESOLINE) 100 MG tablet Take 1 tablet by mouth three times a day including dialysis days    labetaloL (NORMODYNE) 200 MG tablet Take 1 tablet (200 mg total) by mouth 3 (three) times daily.    lisinopriL (PRINIVIL,ZESTRIL) 40 MG tablet Take 1 tablet (40 mg total) by mouth once daily.                 While in the hospital, will manage blood pressure as follows; postop hypotension we will hold all meds with the exception of Cardizem 30 mg q.6 hours    Will utilize p.r.n. blood pressure medication only if patient's blood pressure greater than 180/100 and he develops symptoms such as worsening chest pain or shortness of breath.

## 2024-05-22 NOTE — ASSESSMENT & PLAN NOTE
- Chronic liver disease from previous history of hepatitis B   - Hasn't followed with hepatology/transplant in a while, per recent note, not approved for LRD/CAD transplant due to heart disease, pulmonary hypertension, hypoxemia, liver disease of unknown cause.  - AST/ALT trending up with recent labs; holding statin; will trend

## 2024-05-22 NOTE — NURSING
MD was notified that pt was pooling a consistent amount of blood, noted from the GERARD insertion site amounting in an estimate of 300ML.    MD call to report verbal orders carried out at beside to address bleeding. Soiled dressing changed, pressure applied for 5 mintues to scrotal area, and surgical dressing reinforced. Was informed to notified MD of any changes or with questions or concerns regarding surgical incision. No further actions at this time. Will continue to monitor and report accordingly.

## 2024-05-22 NOTE — ASSESSMENT & PLAN NOTE
- Patient's anemia is currently uncontrolled. Has received 2 units of PRBCs on 5/22 . Etiology likely d/t acute blood loss which was from penile prosthesis implantation and chronic disease due to ESRD and chronic liver disease  - Current CBC reviewed:   Lab Results   Component Value Date    HGB 7.3 (L) 05/22/2024    HCT 22.2 (L) 05/22/2024   - Monitor serial CBC and transfuse if patient becomes hemodynamically unstable, symptomatic or H/H drops below 7/21.  - Coumadin held today

## 2024-05-22 NOTE — NURSING
Upon arrival to shift pt having bleed at the incision site of the devorah drain.  Dr. Shirley removed devorah drain and placed an stitch at the site.  Site has stopped bleeding.  Pt started dialysis and blood pressure dropped 70's over 40's per dialysis nurse while receiving first ordered unit of rbc, and heart rate went up to 180's.   Notified.  Pt expressed that this has happened before and he needs his Cardizem.  Pt wanted to hold off on meds because of bp and not feeling well this morning.  He chose the meds he would take and said he would take the others later when feeling better.   Dr. Chang or Dr. Shirley stated its not a transfusion reaction, and pt has a history of the above stated situation.  Pt brought to ICU.

## 2024-05-22 NOTE — NURSING
Received patient from third floor. Pt AAOx3. VSS. No c/o SOB or discomfort at this time. Patient on cardiac monitor noted to be going in and out of Afib w/ RVR. Dr Hu at bedside. Patient stated he has been going through this for 7 years and he has not taken his cardizem today and asks if we would give it to him bc this usually resolves the problem.

## 2024-05-22 NOTE — PROGRESS NOTES
Chief Complaint: erectile dysfunction, preop clearance    HPI:   5/22/24- is had persistent bleeding from around the drain, otherwise no perioperative issues.    5/20/24-  54 year old male with significant cardiac and renal disease.  ED not treated by conservative measures and here for preoperative clearance for his IPP in the AM.  Currently no symptoms or issues, due for dialysis today.      Allergies:  Patient has no known allergies.    Medications:  See MAR    Review of Systems:  General: No fever, chills, fatigability, or weight loss.  Skin: No rashes, itching, or changes in color or texture of skin.  Chest: Denies HAY, cyanosis, wheezing, cough, and sputum production.  Abdomen: Appetite fine. No weight loss. Denies diarrhea, abdominal pain, hematemesis, or blood in stool.  Musculoskeletal: No joint stiffness or swelling. Denies back pain.  : As above.  All other review of systems negative.    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    PSH:   has a past surgical history that includes ASD repair; Cardiac valuve replacement (02/08/2017); Radiofrequency ablation (03/13/2017); Cardiac catheterization; AV Graft Creation (Left, 03/2017); Colonoscopy (N/A, 8/27/2019); Esophagogastroduodenoscopy (N/A, 8/27/2019); Removal of graft (Left, 7/2/2020); Colonoscopy (N/A, 9/3/2020); and Right heart catheterization (Right, 8/12/2021).    FamHx: family history includes Anesthesia problems in his paternal uncle; Cancer in his mother; Diabetes in his paternal aunt and paternal  aunt; Heart attack in his father; Heart failure in his paternal grandmother; Hypertension in his paternal aunt; Leukemia in his mother and paternal aunt; No Known Problems in his brother, sister, sister, and sister; Stroke in his paternal aunt; Suicide in his paternal uncle; Valvular heart disease in his maternal aunt.    SocHx:  reports that he has never smoked. He has never used smokeless tobacco. He reports that he does not drink alcohol and does not use drugs.      Physical Exam:  Vitals:    05/22/24 0653   BP:    Pulse:    Resp: 18   Temp:      General: A&Ox3, no apparent distress, no deformities  Neck: No masses, normal ROM  Lungs: normal inspiration, no use of accessory muscles  Heart: normal pulse, no arrhythmias  Abdomen: Soft, NT, ND, no masses, no hernias, no hepatosplenomegaly  Skin: The skin is warm and dry. No jaundice.  Ext: No c/c/e.  : 5/22/24- stasis in good position, tips in the glans.  Drain is currently nonfunctioning despite stripping of the drain with alcohol pads.  Slight bleeding from around the drain, incision is clean, dry and intact.  The pump is easily palpated in the scrotum.  Slightly decompressed to approximately 1/3.    Labs/Studies:   Labs are pending.    Impression/Plan:     ED-  s/p IPP  POD #1    -the drain is not functioning, 3-0 chromic was used to close the site to assist with persistent bleeding.  -hematoma will most likely occur, tamponade is the desired goal.  Continue to monitor CBC closely.  -labs are pending, transfusion as needed and can be done during dialysis today.  -keep prosthesis at current level to assist with postoperative bleeding.    Addendum: 5/22/24- patient's hemoglobin and hematocrit have decreased with a slight reduction in vitals.  Therefore will transfuse 2 units during dialysis if feasible per Nephrology recommendations.

## 2024-05-22 NOTE — CARE UPDATE
Pt underwent penile implant 5/21. This am pt  with marginal BP, K 7.2, 3 pt drop in Hb. Pt  initiated on hemodialysis with 1l fluid bolus and albumin. Pt BP 90. Blood begun and pt went to afib RVR. Dr Mcneil gave one dose of metoprolol without improvement in HR. Pt taken off HD and transferred to [facility] for closer monitoring. Do not feel he had a transfusion rxn. Have ordered continuation of transfusion. Will restart low dose cardizem. Appreciate ICU and nephro assistance.

## 2024-05-23 PROCEDURE — 25000003 PHARM REV CODE 250: Performed by: INTERNAL MEDICINE

## 2024-05-23 PROCEDURE — 25000003 PHARM REV CODE 250

## 2024-05-23 PROCEDURE — 25000003 PHARM REV CODE 250: Performed by: HOSPITALIST

## 2024-05-23 PROCEDURE — 63600175 PHARM REV CODE 636 W HCPCS: Performed by: UROLOGY

## 2024-05-23 PROCEDURE — 21400001 HC TELEMETRY ROOM

## 2024-05-23 PROCEDURE — 25000003 PHARM REV CODE 250: Performed by: UROLOGY

## 2024-05-23 PROCEDURE — 94761 N-INVAS EAR/PLS OXIMETRY MLT: CPT

## 2024-05-23 PROCEDURE — 63600175 PHARM REV CODE 636 W HCPCS: Mod: JZ,JG | Performed by: HOSPITALIST

## 2024-05-23 PROCEDURE — 25000003 PHARM REV CODE 250: Performed by: NURSE PRACTITIONER

## 2024-05-23 PROCEDURE — 99232 SBSQ HOSP IP/OBS MODERATE 35: CPT | Mod: ,,, | Performed by: INTERNAL MEDICINE

## 2024-05-23 RX ORDER — SODIUM CHLORIDE 9 MG/ML
INJECTION, SOLUTION INTRAVENOUS ONCE
OUTPATIENT
Start: 2024-05-23 | End: 2024-05-23

## 2024-05-23 RX ORDER — SODIUM CHLORIDE 9 MG/ML
INJECTION, SOLUTION INTRAVENOUS
OUTPATIENT
Start: 2024-05-23

## 2024-05-23 RX ADMIN — CALCIUM ACETATE 2001 MG: 667 CAPSULE ORAL at 08:05

## 2024-05-23 RX ADMIN — DILTIAZEM HYDROCHLORIDE 30 MG: 30 TABLET, FILM COATED ORAL at 05:05

## 2024-05-23 RX ADMIN — MUPIROCIN: 20 OINTMENT TOPICAL at 08:05

## 2024-05-23 RX ADMIN — CALCIUM ACETATE 2001 MG: 667 CAPSULE ORAL at 11:05

## 2024-05-23 RX ADMIN — HYDROCODONE BITARTRATE AND ACETAMINOPHEN 1 TABLET: 5; 325 TABLET ORAL at 11:05

## 2024-05-23 RX ADMIN — MORPHINE SULFATE 2 MG: 2 INJECTION, SOLUTION INTRAMUSCULAR; INTRAVENOUS at 12:05

## 2024-05-23 RX ADMIN — WARFARIN SODIUM 11.25 MG: 2.5 TABLET ORAL at 05:05

## 2024-05-23 RX ADMIN — LEVOFLOXACIN 500 MG: 500 TABLET, FILM COATED ORAL at 08:05

## 2024-05-23 RX ADMIN — PANTOPRAZOLE SODIUM 40 MG: 40 TABLET, DELAYED RELEASE ORAL at 08:05

## 2024-05-23 RX ADMIN — MUPIROCIN: 20 OINTMENT TOPICAL at 09:05

## 2024-05-23 RX ADMIN — CEFAZOLIN 1 G: 1 INJECTION, POWDER, FOR SOLUTION INTRAMUSCULAR; INTRAVENOUS at 08:05

## 2024-05-23 RX ADMIN — DILTIAZEM HYDROCHLORIDE 30 MG: 30 TABLET, FILM COATED ORAL at 11:05

## 2024-05-23 RX ADMIN — MORPHINE SULFATE 2 MG: 2 INJECTION, SOLUTION INTRAMUSCULAR; INTRAVENOUS at 09:05

## 2024-05-23 RX ADMIN — HYDROCODONE BITARTRATE AND ACETAMINOPHEN 1 TABLET: 5; 325 TABLET ORAL at 05:05

## 2024-05-23 RX ADMIN — SODIUM ZIRCONIUM CYCLOSILICATE 10 G: 5 POWDER, FOR SUSPENSION ORAL at 11:05

## 2024-05-23 RX ADMIN — THERA TABS 1 TABLET: TAB at 08:05

## 2024-05-23 RX ADMIN — HYDROCODONE BITARTRATE AND ACETAMINOPHEN 1 TABLET: 5; 325 TABLET ORAL at 04:05

## 2024-05-23 NOTE — ASSESSMENT & PLAN NOTE
- Dialysis patient MWF at [facility]  -Creatine Improving. BMP reviewed - noted Estimated Creatinine Clearance: 10.9 mL/min (A) (based on SCr of 10.1 mg/dL (H)). according to latest data. Based on current GFR, CKD stage is end stage.    - Monitor UOP and serial BMP and adjust therapy as needed. Renally dose meds. Avoid nephrotoxic medications and procedures.  - Nephrology is following  - Plan for dialysis on normal schedule tomorrow  - Continue renal diet

## 2024-05-23 NOTE — PLAN OF CARE
Pt AAOx4. VSS. Afebrile. NSR on the monitor. On room air. Anuric; pt on hemodialysis. Surgical incision intact. Pain managed with prn meds. Bed alarm set, call bell within reach, POC reviewed with pt.

## 2024-05-23 NOTE — HOSPITAL COURSE
5/23 - Some mild penile soreness/pain. Denies any chest pain/shortness of breath today. Refusing lab draw. Explained risks and still refused. Nephrology following and plan for dialysis tomorrow. Repeat H/H yesterday afternoon improved. No bleeding from surgical site.

## 2024-05-23 NOTE — SUBJECTIVE & OBJECTIVE
Objective:     Vital Signs (Most Recent):  Temp: 97.8 °F (36.6 °C) (05/23/24 0700)  Pulse: 93 (05/23/24 0900)  Resp: 13 (05/23/24 0900)  BP: (!) 112/47 (05/23/24 0900)  SpO2: 98 % (05/23/24 0900) Vital Signs (24h Range):  Temp:  [97.4 °F (36.3 °C)-98.2 °F (36.8 °C)] 97.8 °F (36.6 °C)  Pulse:  [] 93  Resp:  [0-61] 13  SpO2:  [90 %-100 %] 98 %  BP: ()/(39-66) 112/47     Weight: 104.5 kg (230 lb 6.1 oz)  Body mass index is 29.58 kg/m².  Intake/Output Summary (Last 24 hours) at 5/23/2024 1028  Last data filed at 5/22/2024 1130  Gross per 24 hour   Intake 1250 ml   Output --   Net 1250 ml     Physical Exam  Constitutional:       General: He is not in acute distress.     Appearance: He is ill-appearing.   HENT:      Head: Normocephalic.      Mouth/Throat:      Mouth: Mucous membranes are moist.   Eyes:      General: Scleral icterus present.      Extraocular Movements: Extraocular movements intact.   Cardiovascular:      Rate and Rhythm: Normal rate. Rhythm irregular.      Pulses: Normal pulses.   Pulmonary:      Effort: Pulmonary effort is normal. No respiratory distress.      Breath sounds: Normal breath sounds. No wheezing.   Abdominal:      General: There is no distension.      Palpations: Abdomen is soft.      Tenderness: There is no abdominal tenderness.   Genitourinary:     Comments: Penile swelling noted and appropriate. No drainage/bleeding in the area  Musculoskeletal:         General: Normal range of motion.      Cervical back: Neck supple.      Right lower leg: No edema.      Left lower leg: No edema.   Skin:     General: Skin is warm.      Capillary Refill: Capillary refill takes less than 2 seconds.      Coloration: Skin is not jaundiced.   Neurological:      Mental Status: He is alert and oriented to person, place, and time.   Psychiatric:         Mood and Affect: Mood normal.         Behavior: Behavior normal.         Thought Content: Thought content normal.         Judgment: Judgment normal.      Review of Systems   Constitutional:  Negative for fatigue and fever.   Respiratory:  Negative for chest tightness and shortness of breath.    Cardiovascular:  Negative for chest pain and palpitations.   Gastrointestinal:  Negative for abdominal pain, nausea and vomiting.   Genitourinary:  Positive for penile pain and penile swelling.   Neurological:  Negative for weakness and headaches.   Psychiatric/Behavioral:  Negative for agitation and confusion. The patient is not nervous/anxious.      Vents:  Oxygen Concentration (%): 21 (05/23/24 0742)    Lines/Drains/Airways       Central Venous Catheter Line  Duration                  Hemodialysis AV Graft 07/04/20 0701 Left upper arm 1419 days              Peripheral Intravenous Line  Duration                  Peripheral IV - Single Lumen  18 G Anterior;Right Upper Arm -- days                  Significant Labs:    CBC/Anemia Profile:  Recent Labs   Lab 05/22/24  0757 05/22/24  1248 05/22/24  1743   WBC 11.17 11.77  --    HGB 8.5* 7.3* 8.7*   HCT 26.6* 22.2* 26.2*   * 118*  --    * 100*  --    RDW 16.2* 16.2*  --      Chemistries:  Recent Labs   Lab 05/22/24  0757 05/22/24  1247    136   K 7.2* 4.7    100   CO2 17* 18*   BUN 83* 55*   CREATININE 14.9* 10.1*   CALCIUM 9.0 9.2   ALBUMIN 3.0* 3.4*   PROT 6.6 6.8   BILITOT 0.6 0.5   ALKPHOS 52* 48*   ALT 55* 217*   AST 49* 175*     Significant Imaging:  I have reviewed all pertinent imaging results/findings within the past 24 hours.  I have reviewed and interpreted all pertinent imaging results/findings within the past 24 hours.

## 2024-05-23 NOTE — PROGRESS NOTES
[facility] ([facility])  Critical Care Medicine  Progress Note    Patient Name: Isidro White Jr.  MRN: [id number]  Admission Date: 5/19/2024  Hospital Length of Stay: 4 days  Code Status: Full Code  Attending Provider: Bonifacio Shirley MD  Primary Care Provider: Boston Nevarez MD   Principal Problem: Drug-induced erectile dysfunction    Subjective:     HPI:  Mr. White is a 54-year-old male with past medical history of diastolic heart failure, ESRD on dialysis (MWF [facility]), Aortic stenosis s/p AVR (2017) on coumadin, Atrial fibrillation, HTN, HLD, drug-induced erectile dysfunction, Hx of hepatitis B, DANIEL who was a direct admit from Dr. Shirley (urology) on 5/19 for elective implantation of penile prosthesis. Hospital medicine consulted for medical management and coumadin to lovenox bridge for procedure. Asymptomatic on presentation. Patient cleared for surgery and underwent insertion of 3-piece inflatable penile prosthesis on 5/21. Yesterday evening, patient began to leak blood at insertion site of GERARD drain and Dr. Shirley stopped bleeding at bedside and redressed site. Consistent amount of blood draining from site this morning (300cc estimate). Multiple clots passed through drain, subsequently removed and site closed with suture. Nephrology also following for continuation of dialysis. Due to drop in H/H, recent bleeding, planned 2U PRBC during dialysis session today. Per dialysis nurse, patient did not feel well at the start of the session. Then experienced multiple vomiting episodes. BP dropped as low as 80/40, Afib RVR noted on monitor with HR in 150s. Patient also complained of chest pain. 2 hours of dialysis completed but no fluid pulled off. Bp did improved post-fluid bolus. CC consulted and patient transferred up to unit with PRBC transfusing. Patient states that he felt this way b/c his po cardizem has not been given to him over the past couple days. In/out afib/RVR on the monitor.  Denies any more chest pain, shortness of breath. Does complain of some pain/soreness to penis/groin area. Repeat H/H 7.3/22.2, plts 118, K improved to 4.7 from 7.2, Cr improving to 10.1 (baseline around 14), AST/ALT trending up, trop 0.053.     Hospital/ICU Course:  5/23 - Some mild penile soreness/pain. Denies any chest pain/shortness of breath today. Refusing lab draw. Explained risks and still refused. Nephrology following and plan for dialysis tomorrow. Repeat H/H yesterday afternoon improved. No bleeding from surgical site.     Objective:     Vital Signs (Most Recent):  Temp: 97.8 °F (36.6 °C) (05/23/24 0700)  Pulse: 93 (05/23/24 0900)  Resp: 13 (05/23/24 0900)  BP: (!) 112/47 (05/23/24 0900)  SpO2: 98 % (05/23/24 0900) Vital Signs (24h Range):  Temp:  [97.4 °F (36.3 °C)-98.2 °F (36.8 °C)] 97.8 °F (36.6 °C)  Pulse:  [] 93  Resp:  [0-61] 13  SpO2:  [90 %-100 %] 98 %  BP: ()/(39-66) 112/47     Weight: 104.5 kg (230 lb 6.1 oz)  Body mass index is 29.58 kg/m².  Intake/Output Summary (Last 24 hours) at 5/23/2024 1028  Last data filed at 5/22/2024 1130  Gross per 24 hour   Intake 1250 ml   Output --   Net 1250 ml     Physical Exam  Constitutional:       General: He is not in acute distress.     Appearance: He is ill-appearing.   HENT:      Head: Normocephalic.      Mouth/Throat:      Mouth: Mucous membranes are moist.   Eyes:      General: Scleral icterus present.      Extraocular Movements: Extraocular movements intact.   Cardiovascular:      Rate and Rhythm: Normal rate. Rhythm irregular.      Pulses: Normal pulses.   Pulmonary:      Effort: Pulmonary effort is normal. No respiratory distress.      Breath sounds: Normal breath sounds. No wheezing.   Abdominal:      General: There is no distension.      Palpations: Abdomen is soft.      Tenderness: There is no abdominal tenderness.   Genitourinary:     Comments: Penile swelling noted and appropriate. No drainage/bleeding in the area  Musculoskeletal:          General: Normal range of motion.      Cervical back: Neck supple.      Right lower leg: No edema.      Left lower leg: No edema.   Skin:     General: Skin is warm.      Capillary Refill: Capillary refill takes less than 2 seconds.      Coloration: Skin is not jaundiced.   Neurological:      Mental Status: He is alert and oriented to person, place, and time.   Psychiatric:         Mood and Affect: Mood normal.         Behavior: Behavior normal.         Thought Content: Thought content normal.         Judgment: Judgment normal.     Review of Systems   Constitutional:  Negative for fatigue and fever.   Respiratory:  Negative for chest tightness and shortness of breath.    Cardiovascular:  Negative for chest pain and palpitations.   Gastrointestinal:  Negative for abdominal pain, nausea and vomiting.   Genitourinary:  Positive for penile pain and penile swelling.   Neurological:  Negative for weakness and headaches.   Psychiatric/Behavioral:  Negative for agitation and confusion. The patient is not nervous/anxious.      Vents:  Oxygen Concentration (%): 21 (05/23/24 0742)    Lines/Drains/Airways       Central Venous Catheter Line  Duration                  Hemodialysis AV Graft 07/04/20 0701 Left upper arm 1419 days              Peripheral Intravenous Line  Duration                  Peripheral IV - Single Lumen  18 G Anterior;Right Upper Arm -- days                  Significant Labs:    CBC/Anemia Profile:  Recent Labs   Lab 05/22/24  0757 05/22/24  1248 05/22/24  1743   WBC 11.17 11.77  --    HGB 8.5* 7.3* 8.7*   HCT 26.6* 22.2* 26.2*   * 118*  --    * 100*  --    RDW 16.2* 16.2*  --      Chemistries:  Recent Labs   Lab 05/22/24  0757 05/22/24  1247    136   K 7.2* 4.7    100   CO2 17* 18*   BUN 83* 55*   CREATININE 14.9* 10.1*   CALCIUM 9.0 9.2   ALBUMIN 3.0* 3.4*   PROT 6.6 6.8   BILITOT 0.6 0.5   ALKPHOS 52* 48*   ALT 55* 217*   AST 49* 175*     Significant Imaging:  I have reviewed  all pertinent imaging results/findings within the past 24 hours.  I have reviewed and interpreted all pertinent imaging results/findings within the past 24 hours.    Assessment/Plan:     Cardiac/Vascular  Hyperlipidemia  - Currently holding statin due to bumped LFTs  - Will resume once appropriate    Atrial fibrillation  - Patient with Paroxysmal (<7 days) atrial fibrillation which is uncontrolled currently with Beta Blocker and Calcium Channel Blocker.   - Patient is currently in sinus rhythm.[id number] Score: The patient doesn't have any registry metric data available.   - HASBLED Score: 2. Anticoagulation indicated. Anticoagulation done with coumadin .  - Afib RVR during dialysis, hypotension; Has been taking home cardizem but did not take this morning because he was not feeling well  - Once transferred to unit, rate has been controlled  - Continue cardizem, cardiac monitoring     Chronic diastolic heart failure  - Off CHF pathway  - Recent echo from 5/2 reviewed; ejection fraction by visual approximation is 60%.   - Holding home meds due to episode of hypotension  - Monitor I/Os and daily weights    History of mechanical aortic valve replacement  - S/p mechanical aortic valve replacement back in 2016; follows with Dr. Gudino and at coumadin clinic  - Lovenox bridge started 5/19 on admission but held 5/21 for procedure  - Per chart review, INR goal of 2-3  - Coumadin held yesterday due to bleeding/drop in hgb; Will resume coumadin and have pharmacy to dose  - Coumadin restricted diet    Essential hypertension  - Chronic, uncontrolled. Latest blood pressure and vitals reviewed:  Temp:  [97.4 °F (36.3 °C)-98 °F (36.7 °C)]   Pulse:  [71-94]   Resp:  [14-20]   BP: ()/(39-66)   SpO2:  [92 %-100 %]   - Home meds for hypertension were reviewed and noted below:   Hypertension Medications               amLODIPine (NORVASC) 10 MG tablet Take 1 tablet by mouth daily    diltiaZEM (TIADYLT ER) 420 MG Cs24 Take 1 capsule  (420 mg total) by mouth once daily.    hydrALAZINE (APRESOLINE) 100 MG tablet Take 1 tablet by mouth three times a day including dialysis days    labetaloL (NORMODYNE) 200 MG tablet Take 1 tablet (200 mg total) by mouth 3 (three) times daily.    lisinopriL (PRINIVIL,ZESTRIL) 40 MG tablet Take 1 tablet (40 mg total) by mouth once daily.    pep injection Inject 0.3 ml as directed     For compounding pharmacy use:   Add PAPAVERINE 30 mcg  Add PHENTOLAMINE 10 mg  Add ALPROSTADIL 100 mcg   - While in the hospital, will manage blood pressure as follows; Resumed cardizem 30 q6h for HR control; holding all other home meds due to hypotension; will resume if necessary  - Will utilize p.r.n. blood pressure medication only if patient's blood pressure greater than 180/110 and he develops symptoms such as worsening chest pain or shortness of breath.    Renal/  * Drug-induced erectile dysfunction  - Has been following with Dr. Shirley; failed conservative treatment   - Admitted 5/19 for elective implantation of penile prosthesis, now POD2  - Bleeding and GERARD drain complications overnight; removed and site sutured  - Drop in H/H noted, given 1U PRBC yesterday  - Post op plan per urology     ESRD (end stage renal disease) on dialysis  - Dialysis patient MWF at [facility]  -[facility]. BMP reviewed - noted Estimated Creatinine Clearance: 10.9 mL/min (A) (based on SCr of 10.1 mg/dL (H)). according to latest data. Based on current GFR, CKD stage is end stage.    - Monitor UOP and serial BMP and adjust therapy as needed. Renally dose meds. Avoid nephrotoxic medications and procedures.  - Nephrology is following  - Plan for dialysis on normal schedule tomorrow  - Continue renal diet    Oncology  Anemia of chronic disease  - Patient's anemia is currently uncontrolled. Has received 2 units of PRBCs on 5/22 . Etiology likely d/t acute blood loss which was from penile prosthesis implantation and chronic disease due to ESRD and chronic  liver disease  - Current CBC reviewed:   Lab Results   Component Value Date    HGB 8.7 (L) 05/22/2024    HCT 26.2 (L) 05/22/2024   - Monitor serial CBC and transfuse if patient becomes hemodynamically unstable, symptomatic or H/H drops below 7/21    GI  Transaminitis  - LFTs bumped yesterday   - Hx of hep B/chronic liver disease  - Statin was held  - Continue to monitor but patient is refusing lab draws currently    GERD (gastroesophageal reflux disease)  - Protonix daily    Chronic hepatitis B  - Chronic liver disease from previous history of hepatitis B   - Hasn't followed with hepatology/transplant in a while, per recent note, not approved for LRD/CAD transplant due to heart disease, pulmonary hypertension, hypoxemia, liver disease of unknown cause.  - AST/ALT trending up with recent labs; holding statin; will trend     Patient is stable for transfer to the floor. Will consult hospital medicine and ask to see. Please call with any questions or concerns. Will sign off.      Andrew Darling PA-C  Critical Care Medicine  [facility] ([facility])

## 2024-05-23 NOTE — ASSESSMENT & PLAN NOTE
- Patient with Paroxysmal (<7 days) atrial fibrillation which is uncontrolled currently with Beta Blocker and Calcium Channel Blocker.   - Patient is currently in sinus rhythm.[id number] Score: The patient doesn't have any registry metric data available.   - HASBLED Score: 2. Anticoagulation indicated. Anticoagulation done with coumadin .  - Afib RVR during dialysis, hypotension; Has been taking home cardizem but did not take this morning because he was not feeling well  - Once transferred to unit, rate has been controlled  - Continue cardizem, cardiac monitoring

## 2024-05-23 NOTE — ASSESSMENT & PLAN NOTE
- Patient's anemia is currently uncontrolled. Has received 2 units of PRBCs on 5/22 . Etiology likely d/t acute blood loss which was from penile prosthesis implantation and chronic disease due to ESRD and chronic liver disease  - Current CBC reviewed:   Lab Results   Component Value Date    HGB 8.7 (L) 05/22/2024    HCT 26.2 (L) 05/22/2024   - Monitor serial CBC and transfuse if patient becomes hemodynamically unstable, symptomatic or H/H drops below 7/21

## 2024-05-23 NOTE — ASSESSMENT & PLAN NOTE
- LFTs bumped yesterday   - Hx of hep B/chronic liver disease  - Statin was held  - Continue to monitor but patient is refusing lab draws currently

## 2024-05-23 NOTE — ASSESSMENT & PLAN NOTE
- Has been following with Dr. Shirley; failed conservative treatment   - Admitted 5/19 for elective implantation of penile prosthesis, now POD2  - Bleeding and GERARD drain complications overnight; removed and site sutured  - Drop in H/H noted, given 1U PRBC yesterday  - Post op plan per urology

## 2024-05-23 NOTE — PROGRESS NOTES
Coumadin Progress Note    Consult day # 3  Indication: mechanical AVR, Afib   Goal INR: 2.0-3.0 (will clarify since pt has AVR plus additional risk factor of Afib so should be 2.5-3.5)  Today's INR: not available (pt refused lab draw)  Home regimen: 7.5 mg every Mon, Wed, Fri & 11.25 mg all other days   Plan for today: resume home regimen  Coumadin restriction on diet order  Daily INR ordered  Will attempt to  pt     Pharmacy will monitor daily INR & make dosage adjustments as needed.    Thank you for allowing us to participate in this patient's care.   Katherine McArdle, Pharm.D. 5/23/2024 3:32 PM

## 2024-05-23 NOTE — PROGRESS NOTES
Pharmacy Brief Progress Note:    Patient educated on warfarin indication, side effects, and drug interactions. Discussed importance of medication compliance and INR monitoring and reviewed signs of abnormal bleeding. Patient given warfarin educational handouts. Patient expressed understanding and had no further questions.    Thank you for allowing us to participate in this patient's care.   Katherine McArdle, Pharm.D. 5/23/2024 3:42 PM

## 2024-05-23 NOTE — PROGRESS NOTES
Nephrology Progress Note     History of Present Illness   54 yr hx of end-stage renal disease presented to the ER directly admitted from Urology office for a scheduled penile prosthesis procedure.  Hospital Medicine was consulted to manage medical problems and to bridge with Lovenox and Coumadin for procedure.     His last dialysis outpatient was on Friday 05/17/2024.     Nephrology consulted to provide routine dialysis while here   Procedure complicated by bleeding currently resolved.  No shortness of breath.  Labs from today pending however potassium okay yesterday we will plan to hold dialysis today returned to normal schedule Monday Wednesday Friday in a.m. with no heparin    Interval History     Overnight/currently:  resting in bed no shortness of breath counseled on watching his potassium in his diet plan dialysis in a.m. if stable can be transferred out of ICU     Health Status   Allergies:    has No Known Allergies.    Current medications:     Current Facility-Administered Medications:     0.9%  NaCl infusion (for blood administration), , Intravenous, Q24H PRN, Bonifacio Shirley MD    acetaminophen suppository 650 mg, 650 mg, Rectal, Q6H PRN, Jhon Rosas MD    acetaminophen tablet 650 mg, 650 mg, Oral, Q8H PRN, Jhon Rosas MD, 650 mg at 05/22/24 2012    albumin human 25% bottle 25 g, 25 g, Intravenous, PRN, Ronny Adan MD, Stopped at 05/22/24 1048    albuterol-ipratropium 2.5 mg-0.5 mg/3 mL nebulizer solution 3 mL, 3 mL, Nebulization, Q6H PRN, Jhon Rosas MD    aluminum-magnesium hydroxide-simethicone 200-200-20 mg/5 mL suspension 30 mL, 30 mL, Oral, QID PRN, Jhon Rosas MD    calcium acetate(phosphat bind) capsule 2,001 mg, 2,001 mg, Oral, TID WM, Nimesh Rosas NP, 2,001 mg at 05/23/24 0812    dextrose 10% bolus 125 mL 125 mL, 12.5 g, Intravenous, PRN, Jhon Rosas MD    dextrose 10% bolus 250 mL 250 mL, 25 g, Intravenous, PRN, Jhon Rosas  MD FLORIDALMA    diltiaZEM tablet 30 mg, 30 mg, Oral, Q6H, Andrew Darling PA-C, 30 mg at 05/23/24 0537    glucagon (human recombinant) injection 1 mg, 1 mg, Intramuscular, PRN, Jhon Rosas MD    glucose chewable tablet 16 g, 16 g, Oral, PRN, Jhon Rosas MD    glucose chewable tablet 24 g, 24 g, Oral, PRN, Jhon Rosas MD    HYDROcodone-acetaminophen 5-325 mg per tablet 1 tablet, 1 tablet, Oral, Q6H PRN, Jhon Rosas MD, 1 tablet at 05/23/24 0413    levoFLOXacin tablet 500 mg, 500 mg, Oral, Every other day, Bonifacio Shirley MD, 500 mg at 05/23/24 0812    melatonin tablet 6 mg, 6 mg, Oral, Nightly PRN, Jhon Rosas MD    morphine injection 2 mg, 2 mg, Intravenous, Q4H PRN, Jhon Rosas MD, 2 mg at 05/23/24 0009    multivitamin tablet, 1 tablet, Oral, Daily, Nimesh Rosas NP, 1 tablet at 05/23/24 0812    mupirocin 2 % ointment, , Nasal, BID, Bonifacio Shirley MD, Given at 05/23/24 0819    naloxone 0.4 mg/mL injection 0.02 mg, 0.02 mg, Intravenous, PRN, Jhon Rosas MD    ondansetron injection 4 mg, 4 mg, Intravenous, Q8H PRN, Jhon Rosas MD, 4 mg at 05/22/24 0948    pantoprazole EC tablet 40 mg, 40 mg, Oral, Daily, Nimesh Rosas NP, 40 mg at 05/23/24 0812    pneumoc 20-jimi conj-dip cr(PF) (PREVNAR-20 (PF)) injection Syrg 0.5 mL, 0.5 mL, Intramuscular, vaccine x 1 dose, Monet Shaffer MD    promethazine tablet 25 mg, 25 mg, Oral, Q6H PRN, Jhon Rosas., MD    senna-docusate 8.6-50 mg per tablet 1 tablet, 1 tablet, Oral, BID Alison PATEL Clinton J., MD    sodium chloride 0.9% bolus 250 mL 250 mL, 250 mL, Intravenous, PRN, James Kelley MD    sodium chloride 0.9% flush 10 mL, 10 mL, Intravenous, Q12H Alison PATEL Clinton J., MD    [START ON 5/24/2024] warfarin (COUMADIN) tablet 7.5 mg, 7.5 mg, Oral, Every Mon, Wed, Fri, Bonifacio Shirley MD    warfarin split tablet 11.25 mg, 11.25 mg, Oral, Once per day on Sunday  "Tuesday Thursday Saturday, Bonifacio Shirley MD, 11.25 mg at 05/21/24 1646    Facility-Administered Medications Ordered in Other Encounters:     0.9%  NaCl infusion, , Intravenous, Continuous, Christopher Jane MD    sodium chloride 0.9% flush 10 mL, 10 mL, Intravenous, PRN, Christopher Jane MD     Physical Examination   VS/Measurements   BP (!) 112/47   Pulse 93   Temp 97.8 °F (36.6 °C) (Oral)   Resp 13   Ht 6' 2" (1.88 m)   Wt 104.5 kg (230 lb 6.1 oz)   SpO2 98%   BMI 29.58 kg/m²      General:  Alert and oriented X3, No acute distress.         Nutritional status: Obese.    Neck:  Supple, No lymphadenopathy.     Respiratory:  Lungs are clear to auscultation, Respirations are non-labored, Symmetrical chest wall expansion.    Cardiovascular:  Normal rate, Regular rhythm.  Genitourinary tract bandaged  Gastrointestinal:  Soft, Non-tender, Normal bowel sounds.  Left arm AV fistula no erythema induration  Integumentary:  Warm, Dry.   Psychiatric:  Cooperative, Appropriate mood & affect.        Review / Management   Laboratory Results   Today's Lab Results :    Recent Results (from the past 24 hour(s))   EKG 12-lead    Collection Time: 05/22/24 11:34 AM   Result Value Ref Range    QRS Duration 184 ms    [facility] QTC Calculation 642 ms   EKG 12-lead    Collection Time: 05/22/24 11:35 AM   Result Value Ref Range    QRS Duration 190 ms    [facility] QTC Calculation 658 ms   Comprehensive metabolic panel    Collection Time: 05/22/24 12:47 PM   Result Value Ref Range    Sodium 136 136 - 145 mmol/L    Potassium 4.7 3.5 - 5.1 mmol/L    Chloride 100 95 - 110 mmol/L    CO2 18 (L) 23 - 29 mmol/L    Glucose 85 70 - 110 mg/dL    BUN 55 (H) 6 - 20 mg/dL    Creatinine 10.1 (H) 0.5 - 1.4 mg/dL    Calcium 9.2 8.7 - 10.5 mg/dL    Total Protein 6.8 6.0 - 8.4 g/dL    Albumin 3.4 (L) 3.5 - 5.2 g/dL    Total Bilirubin 0.5 0.1 - 1.0 mg/dL    Alkaline Phosphatase 48 (L) 55 - 135 U/L     (H) 10 - 40 U/L     (H) 10 - 44 U/L    eGFR 6 " (A) >60 mL/min/1.73 m^2    Anion Gap 18 (H) 8 - 16 mmol/L   Troponin I    Collection Time: 05/22/24 12:47 PM   Result Value Ref Range    Troponin I 0.053 (H) 0.000 - 0.026 ng/mL   CBC auto differential    Collection Time: 05/22/24 12:48 PM   Result Value Ref Range    WBC 11.77 3.90 - 12.70 K/uL    RBC 2.23 (L) 4.60 - 6.20 M/uL    Hemoglobin 7.3 (L) 14.0 - 18.0 g/dL    Hematocrit 22.2 (L) 40.0 - 54.0 %     (H) 82 - 98 fL    MCH 32.7 (H) 27.0 - 31.0 pg    MCHC 32.9 32.0 - 36.0 g/dL    RDW 16.2 (H) 11.5 - 14.5 %    Platelets 118 (L) 150 - 450 K/uL    MPV 11.4 9.2 - 12.9 fL    Immature Granulocytes 0.6 (H) 0.0 - 0.5 %    Gran # (ANC) 9.7 (H) 1.8 - 7.7 K/uL    Immature Grans (Abs) 0.07 (H) 0.00 - 0.04 K/uL    Lymph # 0.8 (L) 1.0 - 4.8 K/uL    Mono # 1.2 (H) 0.3 - 1.0 K/uL    Eos # 0.0 0.0 - 0.5 K/uL    Baso # 0.01 0.00 - 0.20 K/uL    nRBC 0 0 /100 WBC    Gran % 81.9 (H) 38.0 - 73.0 %    Lymph % 6.9 (L) 18.0 - 48.0 %    Mono % 10.5 4.0 - 15.0 %    Eosinophil % 0.0 0.0 - 8.0 %    Basophil % 0.1 0.0 - 1.9 %    Differential Method Automated    Hemoglobin    Collection Time: 05/22/24  5:43 PM   Result Value Ref Range    Hemoglobin 8.7 (L) 14.0 - 18.0 g/dL   Hematocrit    Collection Time: 05/22/24  5:43 PM   Result Value Ref Range    Hematocrit 26.2 (L) 40.0 - 54.0 %        Impression and Plan   Diagnosis   End-stage renal disease, Monday Wednesday Friday at [facility]-  --hep B, bedside today    --given tachycardia and CP, will move him to ICU     Erectile dysfunction   -admitted for penile prosthesis surgery by Urology status post bleeding currently resolved H&H pending     History of mechanical aortic valve replacement  -Lovenox in the hospital     Chronic diastolic heart failure     Atrial fibrillation    --check EKG, give lopressor 5mg IV now     -last ejection fraction 60% with grade 2 diastolic dysfunction     Hypertension with chronic kidney disease   -controlled on current meds      Anemia of chronic  kidney disease     --lost blood with surgery and getting PRBC today     Chronic hepatitis-B  -bedside dialysis for future treatments     Secondary hyperparathyroidism of renal origin      ______________________________________________  Ahsan Ford MD    This document was created using voice recognition software.  It is possible that there are errors which have persisted after original proofreading.  If there is a question regarding contents of this document please contact me for clarification.

## 2024-05-23 NOTE — ASSESSMENT & PLAN NOTE
Patient is identified as having Diastolic (HFpEF) heart failure that is Chronic. CHF is currently controlled. Latest ECHO performed and demonstrates- Results for orders placed during the hospital encounter of 05/02/24    Echo    Interpretation Summary    Left Ventricle: The left ventricle is normal in size. Normal wall thickness. There is concentric remodeling. Normal wall motion. Ejection fraction by visual approximation is 60%. Grade II diastolic dysfunction.    Right Ventricle: Normal right ventricular cavity size. Wall thickness is normal. Systolic function is normal.    Left Atrium: Left atrium is severely dilated.    Aortic Valve: There is a tilting disc prosthetic valve in the aortic position.    Mitral Valve: Moderately calcified leaflets. Moderately restricted motion. There is mild to moderate stenosis. MVA 2.6 cm2 by planimetry, 1.6 cm2 by PHT.  The mean pressure gradient across the mitral valve is 11 mmHg at a heart rate of  bpm. There is mild regurgitation.    Tricuspid Valve: There is mild to moderate regurgitation.    Pulmonary Artery: There is pulmonary hypertension. The estimated pulmonary artery systolic pressure is 70 mmHg.    IVC/SVC: Normal venous pressure at 3 mmHg.   Place on fluid restriction of 1.5 L. Continue to stress to patient importance of self efficacy and  on diet for CHF.

## 2024-05-23 NOTE — NURSING
Patient has refused to have any lab blood draws on multiple occasions. Dr Shaffer spoke with patient and informed him of the importance of the blood work in order to properly provide care for him but he still refused to have blood drawn.

## 2024-05-23 NOTE — PROGRESS NOTES
Chief Complaint: erectile dysfunction, preop clearance    HPI:   5/23/24- doing well, no bleeding noted.  5/22/24- is had persistent bleeding from around the drain, otherwise no perioperative issues.    5/20/24-  54 year old male with significant cardiac and renal disease.  ED not treated by conservative measures and here for preoperative clearance for his IPP in the AM.  Currently no symptoms or issues, due for dialysis today.      Allergies:  Patient has no known allergies.    Medications:  See MAR    Review of Systems:  General: No fever, chills, fatigability, or weight loss.  Skin: No rashes, itching, or changes in color or texture of skin.  Chest: Denies HAY, cyanosis, wheezing, cough, and sputum production.  Abdomen: Appetite fine. No weight loss. Denies diarrhea, abdominal pain, hematemesis, or blood in stool.  Musculoskeletal: No joint stiffness or swelling. Denies back pain.  : As above.  All other review of systems negative.    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    PSH:   has a past surgical history that includes ASD repair; Cardiac valuve replacement (02/08/2017); Radiofrequency ablation (03/13/2017); Cardiac catheterization; AV Graft Creation (Left, 03/2017); Colonoscopy (N/A, 8/27/2019); Esophagogastroduodenoscopy (N/A, 8/27/2019); Removal of graft (Left, 7/2/2020); Colonoscopy (N/A, 9/3/2020); Right heart catheterization (Right, 8/12/2021); and Insertion of inflatable penile prosthesis (N/A, 5/21/2024).    FamHx: family history includes  Anesthesia problems in his paternal uncle; Cancer in his mother; Diabetes in his paternal aunt and paternal aunt; Heart attack in his father; Heart failure in his paternal grandmother; Hypertension in his paternal aunt; Leukemia in his mother and paternal aunt; No Known Problems in his brother, sister, sister, and sister; Stroke in his paternal aunt; Suicide in his paternal uncle; Valvular heart disease in his maternal aunt.    SocHx:  reports that he has never smoked. He has never used smokeless tobacco. He reports that he does not drink alcohol and does not use drugs.      Physical Exam:  Vitals:    05/23/24 0600   BP:    Pulse: 84   Resp: 15   Temp:      General: A&Ox3, no apparent distress, no deformities  Neck: No masses, normal ROM  Lungs: normal inspiration, no use of accessory muscles  Heart: normal pulse, no arrhythmias  Abdomen: Soft, NT, ND, no masses, no hernias, no hepatosplenomegaly  Skin: The skin is warm and dry. No jaundice.  Ext: No c/c/e.  : 5/23/24- pump is in good position, tips in the glans.  Swelling appropriate, no drainage from scrotum.  Slightly decompressed to approximately 1/3.    Labs/Studies:   Labs are pending.    Impression/Plan:     ED-  s/p IPP  POD #2    -stable and possible transfer back to floor today.  -swelling is appropriate, continue to monitor CBC closely.  -keep prosthesis at current level to assist with postoperative bleeding.

## 2024-05-23 NOTE — PROGRESS NOTES
[facility] - Intensive Care ([facility] Medicine  Progress Note    Patient Name: Isidro White Jr.  MRN: [id number]  Patient Class: IP- Inpatient   Admission Date: 5/19/2024  Length of Stay: 4 days  Attending Physician: Bonifacio Shirley MD  Primary Care Provider: Boston Nevarez MD        Subjective:     Principal Problem:Drug-induced erectile dysfunction        HPI:  Isidro White Jr. is a 54 y.o. 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  Presented as a direct admit from Dr. Shirley with urology for scheduled penile prosthesis procedure.  Hospital Medicine consulted to manage medical problems and to bridge Lovenox and Coumadin for procedure.  Patient denies any complaints at this time.    PCP: Boston Nevarez    Overview/Hospital Course:  Patient is a 51-year-old male with a history of end-stage renal disease, anemia, aortic valve stenosis, AFib flutter, CHF, GERD, chronic hep B, hypertension, DANIEL, secondary hyperparathyroidism who presented as a direct admit from the urology service for scheduled penile prosthesis.  Patient was on Coumadin due to his mechanical valve and therefore he is undergoing Lovenox Coumadin bridge for procedure.    Underwent procedure 5/21 without complications.  Evening of surgery patient began to have bleeding from the insertion site of the GERARD drain.  Urology stopped the bleeding at bedside and redressed it.  He continued to have blood draining approximately 300 cc with clots past.  A drain was  subsequently removed and the site was closed with a suture per Urology.  Patient reports feeling bad on morning of 5/22.  Blood pressure was down.  He had episode of vomiting.  Labs returned with a 3 point drop in hemoglobin and potassium 7.2.  Patient was initiated on hemodialysis received 1000 cc of saline and 25 g of albumin.  Patient's blood pressure stabilized about 90.  However he went into AFib with RVR.  He was given a small dose of metoprolol and heart rate was not controlled.  Dialysis was discontinued after 2 hours.  And patient was transferred to the ICU for closer monitoring.  In ICU patient received 2 units of packed blood cells.  Repeat labs revealed a potassium before 0.7 hemoglobin of 7.3.  Antihypertensives were held with the exception of restarting Cardizem 30 mg q.6 hours.    Postop day 2. Patient remained stable, with stable vital signs and blood pressure still marginal.  He was continued on the Cardizem 30 mg q.6 hours and remained in sinus rhythm.  No further bleeding.  Patient refused blood draws this a.m..    Interval History:  Patient seen examined at bedside.  States he is ready to go back to his regular get out intensive care.  Denies any complaints.  Did refused a.m. labs    Review of Systems   Constitutional:  Negative for fatigue and fever.   Respiratory:  Negative for chest tightness and shortness of breath.    Cardiovascular:  Negative for chest pain and palpitations.   Gastrointestinal:  Negative for abdominal pain, nausea and vomiting.   Genitourinary:  Positive for penile pain and penile swelling.   Neurological:  Negative for weakness and headaches.   Psychiatric/Behavioral:  Negative for agitation and confusion. The patient is nervous/anxious.      Objective:     Vital Signs (Most Recent):  Temp: 98.1 °F (36.7 °C) (05/23/24 1505)  Pulse: 90 (05/23/24 1600)  Resp: 18 (05/23/24 1600)  BP: (!) 119/59 (05/23/24 1600)  SpO2: (!) 93 % (05/23/24 1600) Vital Signs (24h Range):  Temp:   [97.5 °F (36.4 °C)-98.1 °F (36.7 °C)] 98.1 °F (36.7 °C)  Pulse:  [] 90  Resp:  [0-61] 18  SpO2:  [90 %-100 %] 93 %  BP: ()/(43-69) 119/59     Weight: 104.5 kg (230 lb 6.1 oz)  Body mass index is 29.58 kg/m².  No intake or output data in the 24 hours ending 05/23/24 1735      Physical Exam  Vitals reviewed.   Constitutional:       Appearance: He is ill-appearing.   HENT:      Head: Normocephalic and atraumatic.      Mouth/Throat:      Mouth: Mucous membranes are moist.      Pharynx: Oropharynx is clear.   Eyes:      Extraocular Movements: Extraocular movements intact.      Conjunctiva/sclera: Conjunctivae normal.   Cardiovascular:      Rate and Rhythm: Tachycardia present. Rhythm irregular.      Pulses: Normal pulses.      Heart sounds: Normal heart sounds.   Pulmonary:      Effort: Pulmonary effort is normal.      Breath sounds: Normal breath sounds.   Abdominal:      General: Bowel sounds are normal.      Palpations: Abdomen is soft.   Genitourinary:     Comments: Penile swelling   Musculoskeletal:         General: Normal range of motion.      Cervical back: Normal range of motion and neck supple.   Skin:     General: Skin is warm and dry.   Neurological:      General: No focal deficit present.      Mental Status: He is alert and oriented to person, place, and time. Mental status is at baseline.   Psychiatric:         Mood and Affect: Mood normal.         Behavior: Behavior normal.         Thought Content: Thought content normal.             Significant Labs: All pertinent labs within the past 24 hours have been reviewed.  CBC:   Recent Labs   Lab 05/22/24  0757 05/22/24  1248 05/22/24  1743   WBC 11.17 11.77  --    HGB 8.5* 7.3* 8.7*   HCT 26.6* 22.2* 26.2*   * 118*  --      CMP:   Recent Labs   Lab 05/22/24  0757 05/22/24  1247    136   K 7.2* 4.7    100   CO2 17* 18*   * 85   BUN 83* 55*   CREATININE 14.9* 10.1*   CALCIUM 9.0 9.2   PROT 6.6 6.8   ALBUMIN 3.0* 3.4*   BILITOT  0.6 0.5   ALKPHOS 52* 48*   AST 49* 175*   ALT 55* 217*   ANIONGAP 19* 18*       Significant Imaging: I have reviewed all pertinent imaging results/findings within the past 24 hours.    Assessment/Plan:      * Drug-induced erectile dysfunction        Transaminitis  Likely secondary to relative hypotensive episode.  We will statin and recheck in a.m..      Anemia of chronic disease  Patient's anemia is currently controlled. Has received 2 units of PRBCs on 5/22 . Etiology likely d/t acute blood loss which was from penile prosthesis site  Current CBC reviewed-   Lab Results   Component Value Date    HGB 8.7 (L) 05/22/2024    HCT 26.2 (L) 05/22/2024     Monitor serial CBC and transfuse if patient becomes hemodynamically unstable, symptomatic or H/H drops below 7/21.    GERD (gastroesophageal reflux disease)  Chronic. Stable. Currently asymptomatic. Home medications include PPI/Antacids as needed.  Plan:  -Continue PPI/Antacids as needed         Preoperative clearance  Admitted for heparin bridge from coumadin for scheduled penile prosthesis surgery to be performed by Dr. Shirley.  RCRI revealed class 4 risk with 15.0% 30 day risk of death, mi, cardiac arrest.  Plan:  -pre-op labs and imaging  -lovenox bridge for coumadin  -manage co-morbidities   -cards consult for surgical clearance  -urology primary team      Hyperlipidemia  Patient is chronically on statin.will continue for now. Last Lipid Panel:   Lab Results   Component Value Date    CHOL 248 (H) 03/12/2024    HDL 29 (L) 03/12/2024    LDLCALC 170.0 (H) 03/12/2024    TRIG 245 (H) 03/12/2024    CHOLHDL 11.7 (L) 03/12/2024     Plan:  -transaminitis we will hold  -low fat/low calorie diet        Chronic hepatitis B  Patient has chronic liver disease due to hepatitis b. Their liver disease is compensated.       Atrial fibrillation  Patient with Paroxysmal (<7 days) atrial fibrillation which is controlled currently with Beta Blocker and Calcium Channel Blocker. Patient  is currently in sinus rhythm. Anticoagulation indicated. Anticoagulation done with coumadin. Bridging with lovenox for  penile prosthesis surgery .    Chronic diastolic heart failure  Patient is identified as having Diastolic (HFpEF) heart failure that is Chronic. CHF is currently controlled. Latest ECHO performed and demonstrates- Results for orders placed during the hospital encounter of 05/02/24    Echo    Interpretation Summary    Left Ventricle: The left ventricle is normal in size. Normal wall thickness. There is concentric remodeling. Normal wall motion. Ejection fraction by visual approximation is 60%. Grade II diastolic dysfunction.    Right Ventricle: Normal right ventricular cavity size. Wall thickness is normal. Systolic function is normal.    Left Atrium: Left atrium is severely dilated.    Aortic Valve: There is a tilting disc prosthetic valve in the aortic position.    Mitral Valve: Moderately calcified leaflets. Moderately restricted motion. There is mild to moderate stenosis. MVA 2.6 cm2 by planimetry, 1.6 cm2 by PHT.  The mean pressure gradient across the mitral valve is 11 mmHg at a heart rate of  bpm. There is mild regurgitation.    Tricuspid Valve: There is mild to moderate regurgitation.    Pulmonary Artery: There is pulmonary hypertension. The estimated pulmonary artery systolic pressure is 70 mmHg.    IVC/SVC: Normal venous pressure at 3 mmHg.   Place on fluid restriction of 1.5 L. Continue to stress to patient importance of self efficacy and  on diet for CHF.             ESRD (end stage renal disease) on dialysis   Nephrology consult for HD while in hospital.  Usual chronic maintenance hemodialysis Monday Wednesday Friday [facility]    History of mechanical aortic valve replacement  Plan:  -continue anticoagulation therpay with  Lovenox while in hospital  -tele monitoring  -cards consulted for surgical clearance      Essential hypertension  Chronic, controlled. Latest blood  pressure and vitals reviewed-     Temp:  [97.5 °F (36.4 °C)-98.1 °F (36.7 °C)]   Pulse:  []   Resp:  [0-61]   BP: ()/(43-69)   SpO2:  [90 %-100 %] .   Home meds for hypertension were reviewed and noted below.   Hypertension Medications               amLODIPine (NORVASC) 10 MG tablet Take 1 tablet by mouth daily    diltiaZEM (TIADYLT ER) 420 MG Cs24 Take 1 capsule (420 mg total) by mouth once daily.    hydrALAZINE (APRESOLINE) 100 MG tablet Take 1 tablet by mouth three times a day including dialysis days    labetaloL (NORMODYNE) 200 MG tablet Take 1 tablet (200 mg total) by mouth 3 (three) times daily.    lisinopriL (PRINIVIL,ZESTRIL) 40 MG tablet Take 1 tablet (40 mg total) by mouth once daily.                 While in the hospital, will manage blood pressure as follows; postop hypotension we will hold all meds with the exception of Cardizem 30 mg q.6 hours    Will utilize p.r.n. blood pressure medication only if patient's blood pressure greater than 180/100 and he develops symptoms such as worsening chest pain or shortness of breath.      VTE Risk Mitigation (From admission, onward)           Ordered     warfarin (COUMADIN) tablet 7.5 mg  Every Mon, Wed, Fri 05/22/24 0929     warfarin split tablet 11.25 mg  Once per day on Sunday Tuesday Thursday Saturday 05/21/24 1341     IP VTE HIGH RISK PATIENT  Once         05/19/24 2103     Place sequential compression device  Until discontinued         05/19/24 2103     Reason for No Pharmacological VTE Prophylaxis  Once        Question:  Reasons:  Answer:  Physician Provided (leave comment)  Comment:  pending surgical intervention    05/19/24 2103                    Discharge Planning   SARBJIT:      Code Status: Full Code   Is the patient medically ready for discharge?:     Reason for patient still in hospital (select all that apply): Patient trending condition, Laboratory test, and Treatment  Discharge Plan A: Home            Critical care time  spent on the evaluation and treatment of severe organ dysfunction, review of pertinent labs and imaging studies, discussions with consulting providers and discussions with patient/family: 33 minutes.      Federica Hu MD  Department of Hospital Medicine   [facility] - Intensive Care ([facility])

## 2024-05-23 NOTE — SUBJECTIVE & OBJECTIVE
Interval History:  Patient seen examined at bedside.  States he is ready to go back to his regular get out intensive care.  Denies any complaints.  Did refused a.m. labs    Review of Systems   Constitutional:  Negative for fatigue and fever.   Respiratory:  Negative for chest tightness and shortness of breath.    Cardiovascular:  Negative for chest pain and palpitations.   Gastrointestinal:  Negative for abdominal pain, nausea and vomiting.   Genitourinary:  Positive for penile pain and penile swelling.   Neurological:  Negative for weakness and headaches.   Psychiatric/Behavioral:  Negative for agitation and confusion. The patient is nervous/anxious.      Objective:     Vital Signs (Most Recent):  Temp: 98.1 °F (36.7 °C) (05/23/24 1505)  Pulse: 90 (05/23/24 1600)  Resp: 18 (05/23/24 1600)  BP: (!) 119/59 (05/23/24 1600)  SpO2: (!) 93 % (05/23/24 1600) Vital Signs (24h Range):  Temp:  [97.5 °F (36.4 °C)-98.1 °F (36.7 °C)] 98.1 °F (36.7 °C)  Pulse:  [] 90  Resp:  [0-61] 18  SpO2:  [90 %-100 %] 93 %  BP: ()/(43-69) 119/59     Weight: 104.5 kg (230 lb 6.1 oz)  Body mass index is 29.58 kg/m².  No intake or output data in the 24 hours ending 05/23/24 1735      Physical Exam  Vitals reviewed.   Constitutional:       Appearance: He is ill-appearing.   HENT:      Head: Normocephalic and atraumatic.      Mouth/Throat:      Mouth: Mucous membranes are moist.      Pharynx: Oropharynx is clear.   Eyes:      Extraocular Movements: Extraocular movements intact.      Conjunctiva/sclera: Conjunctivae normal.   Cardiovascular:      Rate and Rhythm: Tachycardia present. Rhythm irregular.      Pulses: Normal pulses.      Heart sounds: Normal heart sounds.   Pulmonary:      Effort: Pulmonary effort is normal.      Breath sounds: Normal breath sounds.   Abdominal:      General: Bowel sounds are normal.      Palpations: Abdomen is soft.   Genitourinary:     Comments: Penile swelling   Musculoskeletal:         General: Normal  range of motion.      Cervical back: Normal range of motion and neck supple.   Skin:     General: Skin is warm and dry.   Neurological:      General: No focal deficit present.      Mental Status: He is alert and oriented to person, place, and time. Mental status is at baseline.   Psychiatric:         Mood and Affect: Mood normal.         Behavior: Behavior normal.         Thought Content: Thought content normal.             Significant Labs: All pertinent labs within the past 24 hours have been reviewed.  CBC:   Recent Labs   Lab 05/22/24  0757 05/22/24  1248 05/22/24  1743   WBC 11.17 11.77  --    HGB 8.5* 7.3* 8.7*   HCT 26.6* 22.2* 26.2*   * 118*  --      CMP:   Recent Labs   Lab 05/22/24  0757 05/22/24  1247    136   K 7.2* 4.7    100   CO2 17* 18*   * 85   BUN 83* 55*   CREATININE 14.9* 10.1*   CALCIUM 9.0 9.2   PROT 6.6 6.8   ALBUMIN 3.0* 3.4*   BILITOT 0.6 0.5   ALKPHOS 52* 48*   AST 49* 175*   ALT 55* 217*   ANIONGAP 19* 18*       Significant Imaging: I have reviewed all pertinent imaging results/findings within the past 24 hours.

## 2024-05-23 NOTE — ASSESSMENT & PLAN NOTE
- S/p mechanical aortic valve replacement back in 2016; follows with Dr. Gudino and at coumadin clinic  - [facility] bridge started 5/19 on admission but held 5/21 for procedure  - Per chart review, INR goal of 2-3  - Coumadin held yesterday due to bleeding/drop in hgb; Will resume coumadin and have pharmacy to dose  - Coumadin restricted diet

## 2024-05-24 ENCOUNTER — TELEPHONE (OUTPATIENT)
Dept: CARDIOLOGY | Facility: CLINIC | Age: 54
End: 2024-05-24
Payer: MEDICARE

## 2024-05-24 LAB
ALBUMIN SERPL BCP-MCNC: 3 G/DL (ref 3.5–5.2)
ALBUMIN SERPL BCP-MCNC: 3 G/DL (ref 3.5–5.2)
ALP SERPL-CCNC: 55 U/L (ref 55–135)
ALT SERPL W/O P-5'-P-CCNC: 98 U/L (ref 10–44)
ANION GAP SERPL CALC-SCNC: 18 MMOL/L (ref 8–16)
ANION GAP SERPL CALC-SCNC: 19 MMOL/L (ref 8–16)
AST SERPL-CCNC: 98 U/L (ref 10–40)
BASOPHILS # BLD AUTO: 0.02 K/UL (ref 0–0.2)
BASOPHILS NFR BLD: 0.2 % (ref 0–1.9)
BILIRUB SERPL-MCNC: 0.4 MG/DL (ref 0.1–1)
BLD PROD TYP BPU: NORMAL
BLD PROD TYP BPU: NORMAL
BLOOD UNIT EXPIRATION DATE: NORMAL
BLOOD UNIT EXPIRATION DATE: NORMAL
BLOOD UNIT TYPE CODE: 5100
BLOOD UNIT TYPE CODE: 5100
BLOOD UNIT TYPE: NORMAL
BLOOD UNIT TYPE: NORMAL
BUN SERPL-MCNC: 83 MG/DL (ref 6–20)
BUN SERPL-MCNC: 83 MG/DL (ref 6–20)
CALCIUM SERPL-MCNC: 9 MG/DL (ref 8.7–10.5)
CALCIUM SERPL-MCNC: 9 MG/DL (ref 8.7–10.5)
CHLORIDE SERPL-SCNC: 97 MMOL/L (ref 95–110)
CHLORIDE SERPL-SCNC: 97 MMOL/L (ref 95–110)
CO2 SERPL-SCNC: 20 MMOL/L (ref 23–29)
CO2 SERPL-SCNC: 20 MMOL/L (ref 23–29)
CODING SYSTEM: NORMAL
CODING SYSTEM: NORMAL
CREAT SERPL-MCNC: 13.6 MG/DL (ref 0.5–1.4)
CREAT SERPL-MCNC: 13.7 MG/DL (ref 0.5–1.4)
CROSSMATCH INTERPRETATION: NORMAL
CROSSMATCH INTERPRETATION: NORMAL
DIFFERENTIAL METHOD BLD: ABNORMAL
DISPENSE STATUS: NORMAL
DISPENSE STATUS: NORMAL
EOSINOPHIL # BLD AUTO: 0.1 K/UL (ref 0–0.5)
EOSINOPHIL NFR BLD: 1 % (ref 0–8)
ERYTHROCYTE [DISTWIDTH] IN BLOOD BY AUTOMATED COUNT: 16.3 % (ref 11.5–14.5)
EST. GFR  (NO RACE VARIABLE): 4 ML/MIN/1.73 M^2
EST. GFR  (NO RACE VARIABLE): 4 ML/MIN/1.73 M^2
FOLATE SERPL-MCNC: 8 NG/ML (ref 4–24)
GLUCOSE SERPL-MCNC: 91 MG/DL (ref 70–110)
GLUCOSE SERPL-MCNC: 92 MG/DL (ref 70–110)
HBV E AB SER QL: REACTIVE
HCT VFR BLD AUTO: 19.8 % (ref 40–54)
HGB BLD-MCNC: 6.8 G/DL (ref 14–18)
IMM GRANULOCYTES # BLD AUTO: 0.05 K/UL (ref 0–0.04)
IMM GRANULOCYTES NFR BLD AUTO: 0.5 % (ref 0–0.5)
INR PPP: 3.1 (ref 0.8–1.2)
LYMPHOCYTES # BLD AUTO: 1 K/UL (ref 1–4.8)
LYMPHOCYTES NFR BLD: 9.7 % (ref 18–48)
MCH RBC QN AUTO: 32.4 PG (ref 27–31)
MCHC RBC AUTO-ENTMCNC: 34.3 G/DL (ref 32–36)
MCV RBC AUTO: 94 FL (ref 82–98)
MONOCYTES # BLD AUTO: 0.8 K/UL (ref 0.3–1)
MONOCYTES NFR BLD: 7.2 % (ref 4–15)
NEUTROPHILS # BLD AUTO: 8.8 K/UL (ref 1.8–7.7)
NEUTROPHILS NFR BLD: 81.4 % (ref 38–73)
NRBC BLD-RTO: 0 /100 WBC
NUM UNITS TRANS PACKED RBC: NORMAL
NUM UNITS TRANS PACKED RBC: NORMAL
PHOSPHATE SERPL-MCNC: 5.1 MG/DL (ref 2.7–4.5)
PLATELET # BLD AUTO: 129 K/UL (ref 150–450)
PMV BLD AUTO: 11.4 FL (ref 9.2–12.9)
POTASSIUM SERPL-SCNC: 4.6 MMOL/L (ref 3.5–5.1)
POTASSIUM SERPL-SCNC: 4.6 MMOL/L (ref 3.5–5.1)
PROT SERPL-MCNC: 6.6 G/DL (ref 6–8.4)
PROTHROMBIN TIME: 31.5 SEC (ref 9–12.5)
RBC # BLD AUTO: 2.1 M/UL (ref 4.6–6.2)
SODIUM SERPL-SCNC: 135 MMOL/L (ref 136–145)
SODIUM SERPL-SCNC: 136 MMOL/L (ref 136–145)
TROPONIN I SERPL DL<=0.01 NG/ML-MCNC: 0.07 NG/ML (ref 0–0.03)
VIT B12 SERPL-MCNC: >2000 PG/ML (ref 210–950)
WBC # BLD AUTO: 10.75 K/UL (ref 3.9–12.7)

## 2024-05-24 PROCEDURE — 11000001 HC ACUTE MED/SURG PRIVATE ROOM

## 2024-05-24 PROCEDURE — 99232 SBSQ HOSP IP/OBS MODERATE 35: CPT | Mod: ,,, | Performed by: INTERNAL MEDICINE

## 2024-05-24 PROCEDURE — 85025 COMPLETE CBC W/AUTO DIFF WBC: CPT

## 2024-05-24 PROCEDURE — 82607 VITAMIN B-12: CPT | Performed by: INTERNAL MEDICINE

## 2024-05-24 PROCEDURE — 21400001 HC TELEMETRY ROOM

## 2024-05-24 PROCEDURE — 85610 PROTHROMBIN TIME: CPT | Performed by: UROLOGY

## 2024-05-24 PROCEDURE — 25000003 PHARM REV CODE 250

## 2024-05-24 PROCEDURE — 80069 RENAL FUNCTION PANEL: CPT | Performed by: INTERNAL MEDICINE

## 2024-05-24 PROCEDURE — 86920 COMPATIBILITY TEST SPIN: CPT | Performed by: INTERNAL MEDICINE

## 2024-05-24 PROCEDURE — 84484 ASSAY OF TROPONIN QUANT: CPT

## 2024-05-24 PROCEDURE — 25000003 PHARM REV CODE 250: Performed by: INTERNAL MEDICINE

## 2024-05-24 PROCEDURE — 82746 ASSAY OF FOLIC ACID SERUM: CPT | Performed by: INTERNAL MEDICINE

## 2024-05-24 PROCEDURE — 63600175 PHARM REV CODE 636 W HCPCS: Mod: JZ,JG | Performed by: HOSPITALIST

## 2024-05-24 PROCEDURE — 25000003 PHARM REV CODE 250: Performed by: UROLOGY

## 2024-05-24 PROCEDURE — 80100014 HC HEMODIALYSIS 1:1

## 2024-05-24 PROCEDURE — 25000003 PHARM REV CODE 250: Performed by: HOSPITALIST

## 2024-05-24 PROCEDURE — 36430 TRANSFUSION BLD/BLD COMPNT: CPT

## 2024-05-24 PROCEDURE — 25000003 PHARM REV CODE 250: Performed by: NURSE PRACTITIONER

## 2024-05-24 PROCEDURE — P9016 RBC LEUKOCYTES REDUCED: HCPCS | Performed by: INTERNAL MEDICINE

## 2024-05-24 PROCEDURE — 80053 COMPREHEN METABOLIC PANEL: CPT

## 2024-05-24 RX ORDER — OXYCODONE AND ACETAMINOPHEN 10; 325 MG/1; MG/1
1 TABLET ORAL EVERY 4 HOURS PRN
Status: DISCONTINUED | OUTPATIENT
Start: 2024-05-24 | End: 2024-05-26 | Stop reason: HOSPADM

## 2024-05-24 RX ORDER — DILTIAZEM HYDROCHLORIDE 5 MG/ML
0.25 INJECTION INTRAVENOUS ONCE
Status: COMPLETED | OUTPATIENT
Start: 2024-05-24 | End: 2024-05-24

## 2024-05-24 RX ORDER — DILTIAZEM HYDROCHLORIDE 30 MG/1
60 TABLET, FILM COATED ORAL EVERY 6 HOURS
Status: DISCONTINUED | OUTPATIENT
Start: 2024-05-25 | End: 2024-05-26 | Stop reason: HOSPADM

## 2024-05-24 RX ORDER — HYDROCODONE BITARTRATE AND ACETAMINOPHEN 500; 5 MG/1; MG/1
TABLET ORAL
Status: DISCONTINUED | OUTPATIENT
Start: 2024-05-24 | End: 2024-05-26 | Stop reason: HOSPADM

## 2024-05-24 RX ADMIN — CALCIUM ACETATE 2001 MG: 667 CAPSULE ORAL at 08:05

## 2024-05-24 RX ADMIN — PANTOPRAZOLE SODIUM 40 MG: 40 TABLET, DELAYED RELEASE ORAL at 08:05

## 2024-05-24 RX ADMIN — DILTIAZEM HYDROCHLORIDE 30 MG: 30 TABLET, FILM COATED ORAL at 11:05

## 2024-05-24 RX ADMIN — MORPHINE SULFATE 2 MG: 2 INJECTION, SOLUTION INTRAMUSCULAR; INTRAVENOUS at 04:05

## 2024-05-24 RX ADMIN — MORPHINE SULFATE 2 MG: 2 INJECTION, SOLUTION INTRAMUSCULAR; INTRAVENOUS at 05:05

## 2024-05-24 RX ADMIN — DILTIAZEM HYDROCHLORIDE 30 MG: 30 TABLET, FILM COATED ORAL at 06:05

## 2024-05-24 RX ADMIN — CALCIUM ACETATE 2001 MG: 667 CAPSULE ORAL at 11:05

## 2024-05-24 RX ADMIN — DILTIAZEM HYDROCHLORIDE 26 MG: 5 INJECTION INTRAVENOUS at 11:05

## 2024-05-24 RX ADMIN — MUPIROCIN: 20 OINTMENT TOPICAL at 09:05

## 2024-05-24 RX ADMIN — HYDROCODONE BITARTRATE AND ACETAMINOPHEN 1 TABLET: 5; 325 TABLET ORAL at 03:05

## 2024-05-24 RX ADMIN — THERA TABS 1 TABLET: TAB at 08:05

## 2024-05-24 RX ADMIN — DILTIAZEM HYDROCHLORIDE 30 MG: 30 TABLET, FILM COATED ORAL at 12:05

## 2024-05-24 RX ADMIN — HYDROCODONE BITARTRATE AND ACETAMINOPHEN 1 TABLET: 5; 325 TABLET ORAL at 12:05

## 2024-05-24 RX ADMIN — OXYCODONE HYDROCHLORIDE AND ACETAMINOPHEN 1 TABLET: 10; 325 TABLET ORAL at 10:05

## 2024-05-24 RX ADMIN — MORPHINE SULFATE 2 MG: 2 INJECTION, SOLUTION INTRAMUSCULAR; INTRAVENOUS at 01:05

## 2024-05-24 RX ADMIN — MUPIROCIN: 20 OINTMENT TOPICAL at 08:05

## 2024-05-24 NOTE — ASSESSMENT & PLAN NOTE
Plan:  -continue anticoagulation therapy with  Lovenox while in hospital  -tele monitoring  -cards consulted for surgical clearance  Patient on Coumadin with INR of 3.1 and no further need for Lovenox

## 2024-05-24 NOTE — PROGRESS NOTES
Nephrology Progress Note     History of Present Illness   54 yr hx of end-stage renal disease presented to the ER directly admitted from Urology office for a scheduled penile prosthesis procedure.  Hospital Medicine was consulted to manage medical problems and to bridge with Lovenox and Coumadin for procedure.     His last dialysis outpatient was on Friday 05/17/2024.     Nephrology consulted to provide routine dialysis while here. His surgical procedure was complicated by excessive bleeding requiring blood transfusion.      Interval History     Overnight/currently:   Resting in bed.  No labs done as patient refused to have labs done.  On oxygen.  No more bleeding.  For dialysis today.  Counseled patient on importance of having labs done to adequately does his anticoagulation.     Health Status   Allergies:    has No Known Allergies.    Current medications:     Current Facility-Administered Medications:     0.9%  NaCl infusion (for blood administration), , Intravenous, Q24H PRN, Bonifacio Shirley MD    acetaminophen suppository 650 mg, 650 mg, Rectal, Q6H PRN, Jhon Rosas MD    acetaminophen tablet 650 mg, 650 mg, Oral, Q8H PRN, Jhon Rosas MD, 650 mg at 05/22/24 2012    albumin human 25% bottle 25 g, 25 g, Intravenous, PRN, Ronny Adan MD, Stopped at 05/22/24 1048    albuterol-ipratropium 2.5 mg-0.5 mg/3 mL nebulizer solution 3 mL, 3 mL, Nebulization, Q6H PRN, Jhon Rosas MD    aluminum-magnesium hydroxide-simethicone 200-200-20 mg/5 mL suspension 30 mL, 30 mL, Oral, QID PRN, Jhon Rosas MD    calcium acetate(phosphat bind) capsule 2,001 mg, 2,001 mg, Oral, TID WM, Nimesh Rosas NP, 2,001 mg at 05/24/24 0828    dextrose 10% bolus 125 mL 125 mL, 12.5 g, Intravenous, PRN, Jhon Rosas MD    dextrose 10% bolus 250 mL 250 mL, 25 g, Intravenous, PRN, Jhon Rosas MD    diltiaZEM tablet 30 mg, 30 mg, Oral, Q6H, Andrew Darling PA-C, 30 mg at 05/24/24  0610    glucagon (human recombinant) injection 1 mg, 1 mg, Intramuscular, PRN, Jhon Rosas MD    glucose chewable tablet 16 g, 16 g, Oral, PRN, Jhon Rosas MD    glucose chewable tablet 24 g, 24 g, Oral, PRN, Jhon Rosas MD    HYDROcodone-acetaminophen 5-325 mg per tablet 1 tablet, 1 tablet, Oral, Q6H PRN, Jhon Rosas MD, 1 tablet at 05/24/24 0006    levoFLOXacin tablet 500 mg, 500 mg, Oral, Every other day, Bonifacio Shirley MD, 500 mg at 05/23/24 0812    melatonin tablet 6 mg, 6 mg, Oral, Nightly PRN, Jhon Rosas MD    morphine injection 2 mg, 2 mg, Intravenous, Q4H PRN, Jhon Rosas MD, 2 mg at 05/24/24 0447    multivitamin tablet, 1 tablet, Oral, Daily, Nimesh Rosas NP, 1 tablet at 05/24/24 0828    mupirocin 2 % ointment, , Nasal, BID, Bonifacio Shirley MD, Given at 05/24/24 0829    naloxone 0.4 mg/mL injection 0.02 mg, 0.02 mg, Intravenous, PRN, Jhon Rosas MD    ondansetron injection 4 mg, 4 mg, Intravenous, Q8H PRN, Jhon Rosas MD, 4 mg at 05/22/24 0948    pantoprazole EC tablet 40 mg, 40 mg, Oral, Daily, Nimesh Rosas NP, 40 mg at 05/24/24 0828    pneumoc 20-jimi conj-dip cr(PF) (PREVNAR-20 (PF)) injection Syrg 0.5 mL, 0.5 mL, Intramuscular, vaccine x 1 dose, Monet Shaffer MD    promethazine tablet 25 mg, 25 mg, Oral, Q6H PRN, Jhon Rosas MD    senna-docusate 8.6-50 mg per tablet 1 tablet, 1 tablet, Oral, BID PRN, Jhon Rosas MD    sodium chloride 0.9% bolus 250 mL 250 mL, 250 mL, Intravenous, PRN, James Kelley MD    sodium chloride 0.9% flush 10 mL, 10 mL, Intravenous, Q12H PRN, Jhon Rosas MD    sodium zirconium cyclosilicate packet 10 g, 10 g, Oral, Daily, Ahsan Ford MD, 10 g at 05/23/24 1114    warfarin (COUMADIN) tablet 7.5 mg, 7.5 mg, Oral, Every Mon, Wed, Fri, Bonifacio Shirley MD    warfarin split tablet 11.25 mg, 11.25 mg, Oral, Once per day on Sunday Tuesday  "Thursday Saturday, Bonifacio Shirley MD, 11.25 mg at 05/23/24 1740    Facility-Administered Medications Ordered in Other Encounters:     0.9%  NaCl infusion, , Intravenous, Continuous, Christopher Jane MD    sodium chloride 0.9% flush 10 mL, 10 mL, Intravenous, PRN, Christopher Jane MD     Physical Examination   VS/Measurements   BP (!) 120/54   Pulse 102   Temp 98.4 °F (36.9 °C) (Oral)   Resp (!) 30   Ht 6' 2" (1.88 m)   Wt 104.5 kg (230 lb 6.1 oz)   SpO2 (!) 93%   BMI 29.58 kg/m²      General:  Alert and oriented X3, No acute distress.         Nutritional status:  Well-built  Neck:  Supple, No lymphadenopathy.     Respiratory:  Lungs are clear to auscultation, Respirations are non-labored, Symmetrical chest wall expansion.    Cardiovascular:  Normal rate, Regular rhythm.   Gastrointestinal:  Soft, Non-tender, Normal bowel sounds.    Genitourinary tract pressure bandages.  Integumentary:  Warm, Dry.   Left arm AV fistula no erythema induration  Psychiatric:  Cooperative, Appropriate mood & affect.        Review / Management   Laboratory Results   Today's Lab Results :    No results found for this or any previous visit (from the past 24 hour(s)).     Impression and Plan   Diagnosis   End-stage renal disease, Monday Wednesday Friday at [facility] on [address]-  --hep B, bedside today    -     Erectile dysfunction   -admitted for penile prosthesis surgery by Urology status post bleeding currently resolved H&H pending     History of mechanical aortic valve replacement  -Lovenox in the hospital     Chronic diastolic heart failure     Atrial fibrillation    --check EKG, give lopressor 5mg IV now     -last ejection fraction 60% with grade 2 diastolic dysfunction     Hypertension with chronic kidney disease   -controlled on current meds      Anemia of chronic kidney disease     --lost blood with surgery and getting PRBC today     Chronic hepatitis-B  -bedside dialysis for future treatments     Secondary " hyperparathyroidism of renal origin            ______________________________________________  Ahsan Ford MD    This document was created using voice recognition software.  It is possible that there are errors which have persisted after original proofreading.  If there is a question regarding contents of this document please contact me for clarification.

## 2024-05-24 NOTE — SUBJECTIVE & OBJECTIVE
Interval History:  Patient seen and examined in ICU discussed with Urology and Nephrology.  After initiation of hemodialysis patient allowed blood work during dialysis which returned with a hemoglobin of 6.8.  He was typed and matched and transfused 2 units of packed blood cells on dialysis.  There is no evidence of bleeding.    Review of Systems   Constitutional:  Negative for fatigue and fever.   Respiratory:  Negative for chest tightness and shortness of breath.    Cardiovascular:  Negative for chest pain and palpitations.   Gastrointestinal:  Negative for abdominal pain, nausea and vomiting.   Genitourinary:  Positive for penile pain and penile swelling.   Neurological:  Negative for weakness and headaches.   Psychiatric/Behavioral:  Negative for agitation and confusion. The patient is not nervous/anxious.      Objective:     Vital Signs (Most Recent):  Temp: 97.8 °F (36.6 °C) (05/24/24 1200)  Pulse: 102 (05/24/24 0500)  Resp: (!) 26 (05/24/24 1332)  BP: (!) 120/54 (05/24/24 0500)  SpO2: (!) 93 % (05/24/24 0500) Vital Signs (24h Range):  Temp:  [97.6 °F (36.4 °C)-98.8 °F (37.1 °C)] 97.8 °F (36.6 °C)  Pulse:  [] 102  Resp:  [17-48] 26  SpO2:  [89 %-96 %] 93 %  BP: (112-132)/(49-63) 120/54     Weight: 104.5 kg (230 lb 6.1 oz)  Body mass index is 29.58 kg/m².    Intake/Output Summary (Last 24 hours) at 5/24/2024 1409  Last data filed at 5/24/2024 1330  Gross per 24 hour   Intake 571 ml   Output 1153 ml   Net -582 ml         Physical Exam  Vitals reviewed.   Constitutional:       Appearance: Normal appearance.   HENT:      Head: Normocephalic and atraumatic.      Mouth/Throat:      Mouth: Mucous membranes are moist.      Pharynx: Oropharynx is clear.   Eyes:      Extraocular Movements: Extraocular movements intact.      Conjunctiva/sclera: Conjunctivae normal.   Cardiovascular:      Rate and Rhythm: Tachycardia present. Rhythm irregular.      Pulses: Normal pulses.      Heart sounds: Normal heart sounds.    Pulmonary:      Effort: Pulmonary effort is normal.      Breath sounds: Normal breath sounds.   Abdominal:      General: Bowel sounds are normal.      Palpations: Abdomen is soft.   Genitourinary:     Comments: Penile swelling   Musculoskeletal:         General: Normal range of motion.      Cervical back: Normal range of motion and neck supple.   Skin:     General: Skin is warm and dry.   Neurological:      General: No focal deficit present.      Mental Status: He is alert and oriented to person, place, and time. Mental status is at baseline.   Psychiatric:         Mood and Affect: Mood normal.         Behavior: Behavior normal.         Thought Content: Thought content normal.             Significant Labs: All pertinent labs within the past 24 hours have been reviewed.  CBC:   Recent Labs   Lab 05/22/24  1743 05/24/24  0950   WBC  --  10.75   HGB 8.7* 6.8*   HCT 26.2* 19.8*   PLT  --  129*     CMP:   Recent Labs   Lab 05/24/24  0950     135*   K 4.6  4.6   CL 97  97   CO2 20*  20*   GLU 91  92   BUN 83*  83*   CREATININE 13.7*  13.6*   CALCIUM 9.0  9.0   PROT 6.6   ALBUMIN 3.0*  3.0*   BILITOT 0.4   ALKPHOS 55   AST 98*   ALT 98*   ANIONGAP 19*  18*     Coagulation:   Recent Labs   Lab 05/24/24  0950   INR 3.1*       Significant Imaging: I have reviewed all pertinent imaging results/findings within the past 24 hours.

## 2024-05-24 NOTE — PROGRESS NOTES
05/24/24 1330   Post-Hemodialysis Assessment   Rinseback Volume (mL) 250 mL   Blood Volume Processed (Liters) 63.5 L   Dialyzer Clearance Clear   Duration of Treatment 310 minutes   Additional Fluid Intake (mL) 571 mL  (Blood)   Total UF (mL) 1153 mL   Net Fluid Removal 653   Post-Hemodialysis Comments 3.5hr H.D. tx completed. Became tachycardic after 1.5hrs into tx., pull stopped and Dr. Ford notified, primary nurse aware. Pt given 2 units of blood per Dr. Chang orders. Pt tachycardic throughout tx. Visited by Charlene Vizcarra and the surgeon. Pt de-accessed per P & [facility] , primary nurse given report.        Yes

## 2024-05-24 NOTE — ASSESSMENT & PLAN NOTE
Nephrology consult for HD while in hospital.  Usual chronic maintenance hemodialysis Monday Wednesday Friday [facility]  Hemodialysis 5/24 with transfusion 2 units packed blood cells

## 2024-05-24 NOTE — ASSESSMENT & PLAN NOTE
Chronic, controlled. Latest blood pressure and vitals reviewed-     Temp:  [97.6 °F (36.4 °C)-98.8 °F (37.1 °C)]   Pulse:  []   Resp:  [17-48]   BP: (112-132)/(49-63)   SpO2:  [89 %-96 %] .   Home meds for hypertension were reviewed and noted below.   Hypertension Medications               amLODIPine (NORVASC) 10 MG tablet Take 1 tablet by mouth daily    diltiaZEM (TIADYLT ER) 420 MG Cs24 Take 1 capsule (420 mg total) by mouth once daily.    hydrALAZINE (APRESOLINE) 100 MG tablet Take 1 tablet by mouth three times a day including dialysis days    labetaloL (NORMODYNE) 200 MG tablet Take 1 tablet (200 mg total) by mouth 3 (three) times daily.    lisinopriL (PRINIVIL,ZESTRIL) 40 MG tablet Take 1 tablet (40 mg total) by mouth once daily.                 While in the hospital, will manage blood pressure as follows; postop hypotension we will hold all meds with the exception of Cardizem 30 mg q.6 hours    Will utilize p.r.n. blood pressure medication only if patient's blood pressure greater than 180/100 and he develops symptoms such as worsening chest pain or shortness of breath.

## 2024-05-24 NOTE — PROGRESS NOTES
[facility] - Intensive Care ([facility] Medicine  Progress Note    Patient Name: Isidro White Jr.  MRN: [id number]  Patient Class: IP- Inpatient   Admission Date: 5/19/2024  Length of Stay: 5 days  Attending Physician: Bonifacio Shirley MD  Primary Care Provider: Boston Nevarez MD        Subjective:     Principal Problem:Drug-induced erectile dysfunction        HPI:  Isidro White Jr. is a 54 y.o. 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  Presented as a direct admit from Dr. Shirley with urology for scheduled penile prosthesis procedure.  Hospital Medicine consulted to manage medical problems and to bridge Lovenox and Coumadin for procedure.  Patient denies any complaints at this time.    PCP: Boston Nevarez    Overview/Hospital Course:  Patient is a 51-year-old male with a history of end-stage renal disease, anemia, aortic valve stenosis, AFib flutter, CHF, GERD, chronic hep B, hypertension, DANIEL, secondary hyperparathyroidism who presented as a direct admit from the urology service for scheduled penile prosthesis.  Patient was on Coumadin due to his mechanical valve and therefore he is undergoing Lovenox Coumadin bridge for procedure.    Underwent procedure 5/21 without complications.  Evening of surgery patient began to have bleeding from the insertion site of the GERARD drain.  Urology stopped the bleeding at bedside and redressed it.  He continued to have blood draining approximately 300 cc with clots past.  A drain was  subsequently removed and the site was closed with a suture per Urology.  Patient reports feeling bad on morning of 5/22.  Blood pressure was down.  He had episode of vomiting.  Labs returned with a 3 point drop in hemoglobin and potassium 7.2.  Patient was initiated on hemodialysis received 1000 cc of saline and 25 g of albumin.  Patient's blood pressure stabilized about 90.  However he went into AFib with RVR.  He was given a small dose of metoprolol and heart rate was not controlled.  Dialysis was discontinued after 2 hours.  And patient was transferred to the ICU for closer monitoring.  In ICU patient received 2 units of packed blood cells.  Repeat labs revealed a potassium before 0.7 hemoglobin of 7.3.  Antihypertensives were held with the exception of restarting Cardizem 30 mg q.6 hours.    Postop day 2. Patient remained stable, with stable vital signs and blood pressure still marginal.  He was continued on the Cardizem 30 mg q.6 hours and remained in sinus rhythm.  No further bleeding.  Patient refused blood draws this a.m..    Patient is stable overnight however this morning his labs returned with a INR 3.1.  White blood cell count of 10.75 hemoglobin 6.8 platelet count of 129 electrolytes stable.  2 units packed blood cells typed and matched stat and transfused on dialysis.  During dialysis patient had episodes of AFib he was given IV Cardizem with some improvement in his heart rate down to the 1 teens.  Patient extensively counseled on need for at least daily blood work to be able to manage his Coumadin as well as his hemoglobin.  Extensively discussed with Urology.    Interval History:  Patient seen and examined in ICU discussed with Urology and Nephrology.  After initiation of hemodialysis patient allowed blood work during dialysis which returned with a hemoglobin of 6.8.  He was typed and matched and transfused 2 units of packed blood cells on dialysis.  There is no evidence of bleeding.    Review of  Systems   Constitutional:  Negative for fatigue and fever.   Respiratory:  Negative for chest tightness and shortness of breath.    Cardiovascular:  Negative for chest pain and palpitations.   Gastrointestinal:  Negative for abdominal pain, nausea and vomiting.   Genitourinary:  Positive for penile pain and penile swelling.   Neurological:  Negative for weakness and headaches.   Psychiatric/Behavioral:  Negative for agitation and confusion. The patient is not nervous/anxious.      Objective:     Vital Signs (Most Recent):  Temp: 97.8 °F (36.6 °C) (05/24/24 1200)  Pulse: 102 (05/24/24 0500)  Resp: (!) 26 (05/24/24 1332)  BP: (!) 120/54 (05/24/24 0500)  SpO2: (!) 93 % (05/24/24 0500) Vital Signs (24h Range):  Temp:  [97.6 °F (36.4 °C)-98.8 °F (37.1 °C)] 97.8 °F (36.6 °C)  Pulse:  [] 102  Resp:  [17-48] 26  SpO2:  [89 %-96 %] 93 %  BP: (112-132)/(49-63) 120/54     Weight: 104.5 kg (230 lb 6.1 oz)  Body mass index is 29.58 kg/m².    Intake/Output Summary (Last 24 hours) at 5/24/2024 1409  Last data filed at 5/24/2024 1330  Gross per 24 hour   Intake 571 ml   Output 1153 ml   Net -582 ml         Physical Exam  Vitals reviewed.   Constitutional:       Appearance: Normal appearance.   HENT:      Head: Normocephalic and atraumatic.      Mouth/Throat:      Mouth: Mucous membranes are moist.      Pharynx: Oropharynx is clear.   Eyes:      Extraocular Movements: Extraocular movements intact.      Conjunctiva/sclera: Conjunctivae normal.   Cardiovascular:      Rate and Rhythm: Tachycardia present. Rhythm irregular.      Pulses: Normal pulses.      Heart sounds: Normal heart sounds.   Pulmonary:      Effort: Pulmonary effort is normal.      Breath sounds: Normal breath sounds.   Abdominal:      General: Bowel sounds are normal.      Palpations: Abdomen is soft.   Genitourinary:     Comments: Penile swelling   Musculoskeletal:         General: Normal range of motion.      Cervical back: Normal range of motion and neck  supple.   Skin:     General: Skin is warm and dry.   Neurological:      General: No focal deficit present.      Mental Status: He is alert and oriented to person, place, and time. Mental status is at baseline.   Psychiatric:         Mood and Affect: Mood normal.         Behavior: Behavior normal.         Thought Content: Thought content normal.             Significant Labs: All pertinent labs within the past 24 hours have been reviewed.  CBC:   Recent Labs   Lab 05/22/24  1743 05/24/24  0950   WBC  --  10.75   HGB 8.7* 6.8*   HCT 26.2* 19.8*   PLT  --  129*     CMP:   Recent Labs   Lab 05/24/24  0950     135*   K 4.6  4.6   CL 97  97   CO2 20*  20*   GLU 91  92   BUN 83*  83*   CREATININE 13.7*  13.6*   CALCIUM 9.0  9.0   PROT 6.6   ALBUMIN 3.0*  3.0*   BILITOT 0.4   ALKPHOS 55   AST 98*   ALT 98*   ANIONGAP 19*  18*     Coagulation:   Recent Labs   Lab 05/24/24  0950   INR 3.1*       Significant Imaging: I have reviewed all pertinent imaging results/findings within the past 24 hours.    Assessment/Plan:      * Drug-induced erectile dysfunction  Status post penile prosthesis  Discussed with Urology his progression is appropriate.      Transaminitis  Likely secondary to relative hypotensive episode.  We will statin and recheck in a.m..      Anemia of chronic disease  Patient's anemia is currently controlled. Has received 2 units of PRBCs on 5/22 . Etiology likely d/t acute blood loss which was from penile prosthesis site  Current CBC reviewed-   Lab Results   Component Value Date    HGB 6.8 (L) 05/24/2024    HCT 19.8 (LL) 05/24/2024     Monitor serial CBC and transfuse if patient becomes hemodynamically unstable, symptomatic or H/H drops below 7/21.    Stat type and match and patient was transfused 2 units of packed blood cells on dialysis    GERD (gastroesophageal reflux disease)  Chronic. Stable. Currently asymptomatic. Home medications include PPI/Antacids as needed.  Plan:  -Continue PPI/Antacids  as needed         Preoperative clearance  Admitted for heparin bridge from coumadin for scheduled penile prosthesis surgery to be performed by Dr. Shirley.  RCRI revealed class 4 risk with 15.0% 30 day risk of death, mi, cardiac arrest.  Plan:  -pre-op labs and imaging  -lovenox bridge for coumadin  -manage co-morbidities   -cards consult for surgical clearance  -urology primary team      Hyperlipidemia  Patient is chronically on statin.will continue for now. Last Lipid Panel:   Lab Results   Component Value Date    CHOL 248 (H) 03/12/2024    HDL 29 (L) 03/12/2024    LDLCALC 170.0 (H) 03/12/2024    TRIG 245 (H) 03/12/2024    CHOLHDL 11.7 (L) 03/12/2024     Plan:  -transaminitis we will hold  -low fat/low calorie diet        Chronic hepatitis B  Patient has chronic liver disease due to hepatitis b. Their liver disease is compensated.       Atrial fibrillation  Patient with Paroxysmal (<7 days) atrial fibrillation which is controlled currently with Beta Blocker and Calcium Channel Blocker. Patient is currently in sinus rhythm. Anticoagulation indicated. Anticoagulation done with coumadin. Bridging with lovenox for  penile prosthesis surgery .    Chronic diastolic heart failure  Patient is identified as having Diastolic (HFpEF) heart failure that is Chronic. CHF is currently controlled. Latest ECHO performed and demonstrates- Results for orders placed during the hospital encounter of 05/02/24    Echo    Interpretation Summary    Left Ventricle: The left ventricle is normal in size. Normal wall thickness. There is concentric remodeling. Normal wall motion. Ejection fraction by visual approximation is 60%. Grade II diastolic dysfunction.    Right Ventricle: Normal right ventricular cavity size. Wall thickness is normal. Systolic function is normal.    Left Atrium: Left atrium is severely dilated.    Aortic Valve: There is a tilting disc prosthetic valve in the aortic position.    Mitral Valve: Moderately calcified  leaflets. Moderately restricted motion. There is mild to moderate stenosis. MVA 2.6 cm2 by planimetry, 1.6 cm2 by PHT.  The mean pressure gradient across the mitral valve is 11 mmHg at a heart rate of  bpm. There is mild regurgitation.    Tricuspid Valve: There is mild to moderate regurgitation.    Pulmonary Artery: There is pulmonary hypertension. The estimated pulmonary artery systolic pressure is 70 mmHg.    IVC/SVC: Normal venous pressure at 3 mmHg.   Place on fluid restriction of 1.5 L. Continue to stress to patient importance of self efficacy and  on diet for CHF.             ESRD (end stage renal disease) on dialysis   Nephrology consult for HD while in hospital.  Usual chronic maintenance hemodialysis Monday Wednesday Friday [facility]  Hemodialysis 5/24 with transfusion 2 units packed blood cells    History of mechanical aortic valve replacement  Plan:  -continue anticoagulation therapy with  Lovenox while in hospital  -tele monitoring  -cards consulted for surgical clearance  Patient on Coumadin with INR of 3.1 and no further need for Lovenox      Essential hypertension  Chronic, controlled. Latest blood pressure and vitals reviewed-     Temp:  [97.6 °F (36.4 °C)-98.8 °F (37.1 °C)]   Pulse:  []   Resp:  [17-48]   BP: (112-132)/(49-63)   SpO2:  [89 %-96 %] .   Home meds for hypertension were reviewed and noted below.   Hypertension Medications               amLODIPine (NORVASC) 10 MG tablet Take 1 tablet by mouth daily    diltiaZEM (TIADYLT ER) 420 MG Cs24 Take 1 capsule (420 mg total) by mouth once daily.    hydrALAZINE (APRESOLINE) 100 MG tablet Take 1 tablet by mouth three times a day including dialysis days    labetaloL (NORMODYNE) 200 MG tablet Take 1 tablet (200 mg total) by mouth 3 (three) times daily.    lisinopriL (PRINIVIL,ZESTRIL) 40 MG tablet Take 1 tablet (40 mg total) by mouth once daily.                 While in the hospital, will manage blood pressure as follows; postop  hypotension we will hold all meds with the exception of Cardizem 30 mg q.6 hours    Will utilize p.r.n. blood pressure medication only if patient's blood pressure greater than 180/100 and he develops symptoms such as worsening chest pain or shortness of breath.      VTE Risk Mitigation (From admission, onward)           Ordered     warfarin (COUMADIN) tablet 7.5 mg  Daily         05/24/24 1127     IP VTE HIGH RISK PATIENT  Once         05/19/24 2103     Place sequential compression device  Until discontinued         05/19/24 2103     Reason for No Pharmacological VTE Prophylaxis  Once        Question:  Reasons:  Answer:  Physician Provided (leave comment)  Comment:  pending surgical intervention    05/19/24 2103                  Long discussion with patient on need for blood sticks.  We will order CBC INR in a.m. patient agrees to blood draw    Discharge Planning   SARBJIT:      Code Status: Full Code   Is the patient medically ready for discharge?:     Reason for patient still in hospital (select all that apply): Patient trending condition, Laboratory test, and Treatment  Discharge Plan A: Home            Critical care time spent on the evaluation and treatment of severe organ dysfunction, review of pertinent labs and imaging studies, discussions with consulting providers and discussions with patient/family: 36 minutes.      Federica Hu MD  Department of Hospital Medicine   [facility] - Intensive Care ([facility])

## 2024-05-24 NOTE — TELEPHONE ENCOUNTER
Called and spoke to pt. He wanted to make Dr. Gudino aware that he is currently in the hospital and they're giving him 30mg of diltiazem even though his prescription is for 420 mg. Informed pt I will let Dr. Gudino know but in the meantime we can schedule a hpfu. Pt scheduled pt for Next Friday 05/31/24.    ----- Message from Kina De sent at 5/24/2024 12:43 PM CDT -----  Contact: self   Patient needs call back. He is in Hospital and his heart rate is increasing. Please call to advise

## 2024-05-24 NOTE — PROGRESS NOTES
Coumadin Progress Note    Consult day # 4  Indication: mechanical AVR, Afib   Goal INR: 2.0-3.0 (lower goal d/t anemia requiring blood transfusions)   Today's INR: 3.1 (up from 1.4 two days ago)   Note: pt is refusing lab draws on non-dialysis days so may only have lab results, including INR, on Mon/Wed/Fri  Home regimen: 7.5 mg every Mon, Wed, Fri & 11.25 mg all other days   Plan for today: reduce dose to 7.5 mg daily (since we cannot check INR daily & level increased by 1.7 in two days)  Hgb = 6.8 this AM --> received 2 units of blood  Discussed with Dr. Chang & she is ok with him receiving coumadin today  Coumadin restriction on diet order  Daily INR ordered  Counseled pt 5/23    Thank you for allowing us to participate in this patient's care.   Katherine McArdle, Pharm.D. 5/24/2024 3:25 PM

## 2024-05-24 NOTE — ASSESSMENT & PLAN NOTE
Patient's anemia is currently controlled. Has received 2 units of PRBCs on 5/22 . Etiology likely d/t acute blood loss which was from penile prosthesis site  Current CBC reviewed-   Lab Results   Component Value Date    HGB 6.8 (L) 05/24/2024    HCT 19.8 (LL) 05/24/2024     Monitor serial CBC and transfuse if patient becomes hemodynamically unstable, symptomatic or H/H drops below 7/21.    Stat type and match and patient was transfused 2 units of packed blood cells on dialysis

## 2024-05-24 NOTE — PROGRESS NOTES
Chief Complaint: erectile dysfunction, preop clearance    HPI:   5/24/24- patient is having some irritation of the glans, otherwise no change.  5/23/24- doing well, no bleeding noted.  5/22/24- is had persistent bleeding from around the drain, otherwise no perioperative issues.    5/20/24-  54 year old male with significant cardiac and renal disease.  ED not treated by conservative measures and here for preoperative clearance for his IPP in the AM.  Currently no symptoms or issues, due for dialysis today.      Allergies:  Patient has no known allergies.    Medications:  See MAR    Review of Systems:  General: No fever, chills, fatigability, or weight loss.  Skin: No rashes, itching, or changes in color or texture of skin.  Chest: Denies HAY, cyanosis, wheezing, cough, and sputum production.  Abdomen: Appetite fine. No weight loss. Denies diarrhea, abdominal pain, hematemesis, or blood in stool.  Musculoskeletal: No joint stiffness or swelling. Denies back pain.  : As above.  All other review of systems negative.    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    PSH:   has a past surgical history that includes ASD repair; Cardiac valuve replacement (02/08/2017); Radiofrequency ablation (03/13/2017); Cardiac catheterization; AV Graft Creation (Left, 03/2017); Colonoscopy (N/A, 8/27/2019); Esophagogastroduodenoscopy (N/A, 8/27/2019); Removal of graft (Left, 7/2/2020); Colonoscopy (N/A, 9/3/2020); Right heart catheterization (Right, 8/12/2021); and Insertion of inflatable  penile prosthesis (N/A, 5/21/2024).    FamHx: family history includes Anesthesia problems in his paternal uncle; Cancer in his mother; Diabetes in his paternal aunt and paternal aunt; Heart attack in his father; Heart failure in his paternal grandmother; Hypertension in his paternal aunt; Leukemia in his mother and paternal aunt; No Known Problems in his brother, sister, sister, and sister; Stroke in his paternal aunt; Suicide in his paternal uncle; Valvular heart disease in his maternal aunt.    SocHx:  reports that he has never smoked. He has never used smokeless tobacco. He reports that he does not drink alcohol and does not use drugs.      Physical Exam:  Vitals:    05/24/24 0500   BP: (!) 120/54   Pulse: 102   Resp: (!) 30   Temp:      General: A&Ox3, no apparent distress, no deformities  Neck: No masses, normal ROM  Lungs: normal inspiration, no use of accessory muscles  Heart: normal pulse, no arrhythmias  Abdomen: Soft, NT, ND, no masses, no hernias, no hepatosplenomegaly  Skin: The skin is warm and dry. No jaundice.  Ext: No c/c/e.  :  5/24/24- pump is in good position, tips in the glans.  Swelling appropriate, no drainage from scrotum.  Due to the irritation of the glans I went ahead and decompress the prosthesis, without incident, no drainage from the scrotum.    Labs/Studies:   Labs are pending.    Impression/Plan:     ED-  s/p IPP  POD #3    -stable and possible transfer back to floor today.  -swelling is appropriate, continue to monitor CBC closely.  -ice packs continuously on scrotum to reduce edema, in addition scrotal elevation.

## 2024-05-25 LAB
ALBUMIN SERPL BCP-MCNC: 2.7 G/DL (ref 3.5–5.2)
ANION GAP SERPL CALC-SCNC: 15 MMOL/L (ref 8–16)
BASOPHILS # BLD AUTO: 0.02 K/UL (ref 0–0.2)
BASOPHILS NFR BLD: 0.2 % (ref 0–1.9)
BLD PROD TYP BPU: NORMAL
BLD PROD TYP BPU: NORMAL
BLOOD UNIT EXPIRATION DATE: NORMAL
BLOOD UNIT EXPIRATION DATE: NORMAL
BLOOD UNIT TYPE CODE: 5100
BLOOD UNIT TYPE CODE: 5100
BLOOD UNIT TYPE: NORMAL
BLOOD UNIT TYPE: NORMAL
BUN SERPL-MCNC: 55 MG/DL (ref 6–20)
CALCIUM SERPL-MCNC: 8.9 MG/DL (ref 8.7–10.5)
CHLORIDE SERPL-SCNC: 96 MMOL/L (ref 95–110)
CO2 SERPL-SCNC: 24 MMOL/L (ref 23–29)
CODING SYSTEM: NORMAL
CODING SYSTEM: NORMAL
CREAT SERPL-MCNC: 11.3 MG/DL (ref 0.5–1.4)
CROSSMATCH INTERPRETATION: NORMAL
CROSSMATCH INTERPRETATION: NORMAL
DIFFERENTIAL METHOD BLD: ABNORMAL
DISPENSE STATUS: NORMAL
DISPENSE STATUS: NORMAL
EOSINOPHIL # BLD AUTO: 0.1 K/UL (ref 0–0.5)
EOSINOPHIL NFR BLD: 0.9 % (ref 0–8)
ERYTHROCYTE [DISTWIDTH] IN BLOOD BY AUTOMATED COUNT: 18.7 % (ref 11.5–14.5)
EST. GFR  (NO RACE VARIABLE): 5 ML/MIN/1.73 M^2
GLUCOSE SERPL-MCNC: 111 MG/DL (ref 70–110)
HCT VFR BLD AUTO: 23.9 % (ref 40–54)
HGB BLD-MCNC: 7.9 G/DL (ref 14–18)
IMM GRANULOCYTES # BLD AUTO: 0.03 K/UL (ref 0–0.04)
IMM GRANULOCYTES NFR BLD AUTO: 0.3 % (ref 0–0.5)
INR PPP: 3.1 (ref 0.8–1.2)
LYMPHOCYTES # BLD AUTO: 1 K/UL (ref 1–4.8)
LYMPHOCYTES NFR BLD: 8.8 % (ref 18–48)
MCH RBC QN AUTO: 30.6 PG (ref 27–31)
MCHC RBC AUTO-ENTMCNC: 33.1 G/DL (ref 32–36)
MCV RBC AUTO: 93 FL (ref 82–98)
MONOCYTES # BLD AUTO: 1.6 K/UL (ref 0.3–1)
MONOCYTES NFR BLD: 14.1 % (ref 4–15)
NEUTROPHILS # BLD AUTO: 8.3 K/UL (ref 1.8–7.7)
NEUTROPHILS NFR BLD: 75.7 % (ref 38–73)
NRBC BLD-RTO: 0 /100 WBC
NUM UNITS TRANS PACKED RBC: NORMAL
NUM UNITS TRANS PACKED RBC: NORMAL
PHOSPHATE SERPL-MCNC: 5.9 MG/DL (ref 2.7–4.5)
PLATELET # BLD AUTO: 129 K/UL (ref 150–450)
PMV BLD AUTO: 11.1 FL (ref 9.2–12.9)
POTASSIUM SERPL-SCNC: 4.3 MMOL/L (ref 3.5–5.1)
PROTHROMBIN TIME: 31.6 SEC (ref 9–12.5)
RBC # BLD AUTO: 2.58 M/UL (ref 4.6–6.2)
SODIUM SERPL-SCNC: 135 MMOL/L (ref 136–145)
WBC # BLD AUTO: 10.98 K/UL (ref 3.9–12.7)

## 2024-05-25 PROCEDURE — 63600175 PHARM REV CODE 636 W HCPCS: Mod: JZ,JG | Performed by: HOSPITALIST

## 2024-05-25 PROCEDURE — 85025 COMPLETE CBC W/AUTO DIFF WBC: CPT

## 2024-05-25 PROCEDURE — 25000003 PHARM REV CODE 250: Performed by: UROLOGY

## 2024-05-25 PROCEDURE — 99232 SBSQ HOSP IP/OBS MODERATE 35: CPT | Mod: ,,, | Performed by: INTERNAL MEDICINE

## 2024-05-25 PROCEDURE — 11000001 HC ACUTE MED/SURG PRIVATE ROOM

## 2024-05-25 PROCEDURE — 80069 RENAL FUNCTION PANEL: CPT | Performed by: INTERNAL MEDICINE

## 2024-05-25 PROCEDURE — 36415 COLL VENOUS BLD VENIPUNCTURE: CPT | Performed by: UROLOGY

## 2024-05-25 PROCEDURE — 85610 PROTHROMBIN TIME: CPT | Performed by: UROLOGY

## 2024-05-25 PROCEDURE — 21400001 HC TELEMETRY ROOM

## 2024-05-25 PROCEDURE — 63600175 PHARM REV CODE 636 W HCPCS: Mod: JZ,EC,JG | Performed by: INTERNAL MEDICINE

## 2024-05-25 PROCEDURE — 25000003 PHARM REV CODE 250: Performed by: NURSE PRACTITIONER

## 2024-05-25 PROCEDURE — 25000003 PHARM REV CODE 250: Performed by: INTERNAL MEDICINE

## 2024-05-25 RX ORDER — WARFARIN SODIUM 5 MG/1
5 TABLET ORAL DAILY
Status: DISCONTINUED | OUTPATIENT
Start: 2024-05-25 | End: 2024-05-26 | Stop reason: HOSPADM

## 2024-05-25 RX ADMIN — MUPIROCIN: 20 OINTMENT TOPICAL at 08:05

## 2024-05-25 RX ADMIN — LEVOFLOXACIN 500 MG: 500 TABLET, FILM COATED ORAL at 08:05

## 2024-05-25 RX ADMIN — CALCIUM ACETATE 2001 MG: 667 CAPSULE ORAL at 04:05

## 2024-05-25 RX ADMIN — DILTIAZEM HYDROCHLORIDE 60 MG: 30 TABLET, FILM COATED ORAL at 11:05

## 2024-05-25 RX ADMIN — EPOETIN ALFA 8000 UNITS: 4000 SOLUTION INTRAVENOUS; SUBCUTANEOUS at 11:05

## 2024-05-25 RX ADMIN — THERA TABS 1 TABLET: TAB at 08:05

## 2024-05-25 RX ADMIN — SODIUM ZIRCONIUM CYCLOSILICATE 10 G: 5 POWDER, FOR SUSPENSION ORAL at 08:05

## 2024-05-25 RX ADMIN — CALCIUM ACETATE 2001 MG: 667 CAPSULE ORAL at 11:05

## 2024-05-25 RX ADMIN — MORPHINE SULFATE 2 MG: 2 INJECTION, SOLUTION INTRAMUSCULAR; INTRAVENOUS at 12:05

## 2024-05-25 RX ADMIN — OXYCODONE HYDROCHLORIDE AND ACETAMINOPHEN 1 TABLET: 10; 325 TABLET ORAL at 04:05

## 2024-05-25 RX ADMIN — DILTIAZEM HYDROCHLORIDE 60 MG: 30 TABLET, FILM COATED ORAL at 12:05

## 2024-05-25 RX ADMIN — PANTOPRAZOLE SODIUM 40 MG: 40 TABLET, DELAYED RELEASE ORAL at 08:05

## 2024-05-25 RX ADMIN — DILTIAZEM HYDROCHLORIDE 60 MG: 30 TABLET, FILM COATED ORAL at 05:05

## 2024-05-25 RX ADMIN — MORPHINE SULFATE 2 MG: 2 INJECTION, SOLUTION INTRAMUSCULAR; INTRAVENOUS at 01:05

## 2024-05-25 RX ADMIN — MORPHINE SULFATE 2 MG: 2 INJECTION, SOLUTION INTRAMUSCULAR; INTRAVENOUS at 08:05

## 2024-05-25 RX ADMIN — CALCIUM ACETATE 2001 MG: 667 CAPSULE ORAL at 08:05

## 2024-05-25 RX ADMIN — OXYCODONE HYDROCHLORIDE AND ACETAMINOPHEN 1 TABLET: 10; 325 TABLET ORAL at 11:05

## 2024-05-25 NOTE — PROGRESS NOTES
Pharmacy Consult Note: Warfarin     Isidro Sheriffteresita Diaz 's Coumadin will be dosed and monitored by Pharmacy.      Target INR goal is 2-3    INR   Date Value Ref Range Status   05/25/2024 3.1 (H) 0.8 - 1.2 Final     Comment:     Coumadin Therapy:  2.0 - 3.0 for INR for all indicators except mechanical heart valves  and antiphospholipid syndromes which should use 2.5 - 3.5.         Indication: mechanical AVR, Afib   Home dose:  7.5 mg every Mon, Wed, Fri & 11.25 mg all other days     Patient will be continued on a dose of 5 mg per day per MD.   Patient refused dose on 5/24.     Dose for Today: 5 mg     PT/INR will be monitored daily. Dose adjustments will be made accordingly.      Thank you for allowing us to participate in this patient's care.     Ana Garcia 5/25/2024 9:52 AM

## 2024-05-25 NOTE — ASSESSMENT & PLAN NOTE
Patient's anemia is currently controlled. Has received 2 units of PRBCs on 5/22 . Etiology likely d/t acute blood loss which was from penile prosthesis site  Current CBC reviewed-   Lab Results   Component Value Date    HGB 7.9 (L) 05/25/2024    HCT 23.9 (L) 05/25/2024     Monitor serial CBC and transfuse if patient becomes hemodynamically unstable, symptomatic or H/H drops below 7/21.    Stat type and match and patient was transfused 2 units of packed blood cells on dialysis

## 2024-05-25 NOTE — SUBJECTIVE & OBJECTIVE
Interval History:  Patient seen examined at bedside.  Discussed with Nephrology and Urology.  Patient is stable continuing to have pain and swelling from procedure.  He has been transitioned to [facility] floor stable heart rate.    Review of Systems   Constitutional:  Negative for fatigue and fever.   Respiratory:  Negative for chest tightness and shortness of breath.    Cardiovascular:  Negative for chest pain and palpitations.   Gastrointestinal:  Negative for abdominal pain, nausea and vomiting.   Genitourinary:  Positive for penile pain and penile swelling.   Neurological:  Negative for weakness and headaches.   Psychiatric/Behavioral:  Negative for agitation and confusion.      Objective:     Vital Signs (Most Recent):  Temp: 98.5 °F (36.9 °C) (05/25/24 1241)  Pulse: 80 (05/25/24 1241)  Resp: 19 (05/25/24 1320)  BP: (!) 97/46 (05/25/24 1241)  SpO2: (!) 91 % (05/25/24 1241) Vital Signs (24h Range):  Temp:  [97.6 °F (36.4 °C)-98.8 °F (37.1 °C)] 98.5 °F (36.9 °C)  Pulse:  [] 80  Resp:  [16-28] 19  SpO2:  [62 %-98 %] 91 %  BP: ()/(46-80) 97/46     Weight: 104.5 kg (230 lb 6.1 oz)  Body mass index is 29.58 kg/m².  No intake or output data in the 24 hours ending 05/25/24 1527      Physical Exam        Significant Labs: All pertinent labs within the past 24 hours have been reviewed.  CBC:   Recent Labs   Lab 05/24/24  0950 05/25/24  0633   WBC 10.75 10.98   HGB 6.8* 7.9*   HCT 19.8* 23.9*   * 129*     CMP:   Recent Labs   Lab 05/24/24  0950 05/25/24  0633     135* 135*   K 4.6  4.6 4.3   CL 97  97 96   CO2 20*  20* 24   GLU 91  92 111*   BUN 83*  83* 55*   CREATININE 13.7*  13.6* 11.3*   CALCIUM 9.0  9.0 8.9   PROT 6.6  --    ALBUMIN 3.0*  3.0* 2.7*   BILITOT 0.4  --    ALKPHOS 55  --    AST 98*  --    ALT 98*  --    ANIONGAP 19*  18* 15     Coagulation:   Recent Labs   Lab 05/25/24  0633   INR 3.1*       Significant Imaging: I have reviewed all pertinent imaging results/findings  within the past 24 hours.

## 2024-05-25 NOTE — PROGRESS NOTES
[facility]– [place] [facility]  Progress Note    Patient Name: Isidro White Jr.  MRN: [id number]  Patient Class: IP- Inpatient   Admission Date: 5/19/2024  Length of Stay: 6 days  Attending Physician: Bonifacio Shirley MD  Primary Care Provider: Boston Nevarez MD        Subjective:     Principal Problem:Drug-induced erectile dysfunction        HPI:  Isidro White Jr. is a 54 y.o. 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  Presented as a direct admit from Dr. Shirley with urology for scheduled penile prosthesis procedure.  Hospital Medicine consulted to manage medical problems and to bridge Lovenox and Coumadin for procedure.  Patient denies any complaints at this time.    PCP: Boston Nevarez    Overview/Hospital Course:  Patient is a 51-year-old male with a history of end-stage renal disease, anemia, aortic valve stenosis, AFib flutter, CHF, GERD, chronic hep B, hypertension, DANIEL, secondary hyperparathyroidism who presented as a direct admit from the urology service for scheduled penile prosthesis.  Patient was on Coumadin due to his mechanical valve and therefore he is undergoing Lovenox Coumadin bridge for procedure.    Underwent procedure 5/21 without complications.  Evening of surgery patient began to have bleeding from the insertion site of the GERARD drain.  Urology stopped the bleeding at bedside and redressed it.  He continued to have blood draining approximately 300 cc with clots past.  A drain was subsequently removed and  the site was closed with a suture per Urology.  Patient reports feeling bad on morning of 5/22.  Blood pressure was down.  He had episode of vomiting.  Labs returned with a 3 point drop in hemoglobin and potassium 7.2.  Patient was initiated on hemodialysis received 1000 cc of saline and 25 g of albumin.  Patient's blood pressure stabilized about 90.  However he went into AFib with RVR.  He was given a small dose of metoprolol and heart rate was not controlled.  Dialysis was discontinued after 2 hours.  And patient was transferred to the ICU for closer monitoring.  In ICU patient received 2 units of packed blood cells.  Repeat labs revealed a potassium before 0.7 hemoglobin of 7.3.  Antihypertensives were held with the exception of restarting Cardizem 30 mg q.6 hours.    Postop day 2. Patient remained stable, with stable vital signs and blood pressure still marginal.  He was continued on the Cardizem 30 mg q.6 hours and remained in sinus rhythm.  No further bleeding.  Patient refused blood draws this a.m..    Patient is stable overnight however this morning his labs returned with a INR 3.1.  White blood cell count of 10.75 hemoglobin 6.8 platelet count of 129 electrolytes stable.  2 units packed blood cells typed and matched stat and transfused on dialysis.  During dialysis patient had episodes of AFib he was given IV Cardizem with some improvement in his heart rate down to the 1 teens.  Patient extensively counseled on need for at least daily blood work to be able to manage his Coumadin as well as his hemoglobin.  Extensively discussed with Urology.    Patient transitioned out of ICU.  No further bleeding noted.  His blood pressure still remains marginal likely secondary to euvolemia while in the hospital.  Long discussion with patient about medications.  Due to relative hypotension patient's Cardizem  has been changed to 60 mg q.6 hours.  Discussed with Dr. Shirley again.  Prosthesis in good location with  no compromise and no evidence of bleeding.  We have decided to check patient's hemoglobin again in a.m. and if stable then likely we will discharge.  Patient has refused Coumadin due to his INR being above 3.  He reports that his therapeutic level is 2-3 although with a mechanical valve in AFib 2.5-3.5 likely should be his level.  He agrees to take 5 mg today.  We discussed risk of thrombosis valve.         [facility] - Intensive Care ([facility])  Hospital Medicine  Progress Note     Patient Name: Isidro White Jr.  MRN: [id number]  Patient Class: IP- Inpatient     Admission Date: 5/19/2024  Length of Stay: 5 days  Attending Physician: Bonifacio Shirley MD  Primary Care Provider: Boston Nevarez MD           Subjective:      Principal Problem:Drug-induced erectile dysfunction           HPI:  Isidro White Jr. is a 54 y.o. 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  Presented as a direct admit from Dr. Shirley with urology for scheduled penile prosthesis procedure.  Hospital Medicine consulted to manage medical problems and to bridge Lovenox and Coumadin for procedure.  Patient denies any complaints at this time.     PCP: Boston Nevarez     Overview/Hospital Course:  Patient is a 51-year-old male with a history of end-stage renal disease, anemia, aortic valve stenosis, AFib flutter, CHF, GERD, chronic hep B, hypertension, DANIEL, secondary hyperparathyroidism who presented as a direct admit from the urology service for scheduled penile prosthesis.   Patient was on Coumadin due to his mechanical valve and therefore he is undergoing Lovenox Coumadin bridge for procedure.     Underwent procedure 5/21 without complications.  Evening of surgery patient began to have bleeding from the insertion site of the GERARD drain.  Urology stopped the bleeding at bedside and redressed it.  He continued to have blood draining approximately 300 cc with clots past.  A drain was subsequently removed and the site was closed with a suture per Urology.  Patient reports feeling bad on morning of 5/22.  Blood pressure was down.  He had episode of vomiting.  Labs returned with a 3 point drop in hemoglobin and potassium 7.2.  Patient was initiated on hemodialysis received 1000 cc of saline and 25 g of albumin.  Patient's blood pressure stabilized about 90.  However he went into AFib with RVR.  He was given a small dose of metoprolol and heart rate was not controlled.  Dialysis was discontinued after 2 hours.  And patient was transferred to the ICU for closer monitoring.  In ICU patient received 2 units of packed blood cells.  Repeat labs revealed a potassium before 0.7 hemoglobin of 7.3.  Antihypertensives were held with the exception of restarting Cardizem 30 mg q.6 hours.     Postop day 2. Patient remained stable, with stable vital signs and blood pressure still marginal.  He was continued on the Cardizem 30 mg q.6 hours and remained in sinus rhythm.  No further bleeding.  Patient refused blood draws this a.m..     Patient is stable overnight however this morning his labs returned with a INR 3.1.  White blood cell count of 10.75 hemoglobin 6.8 platelet count of 129 electrolytes stable.  2 units packed blood cells typed and matched stat and transfused on dialysis.  During dialysis patient had episodes of AFib he was given IV Cardizem with some improvement in his heart rate down to the 1 teens.  Patient extensively counseled on need for at least daily blood work to be able to manage his  Coumadin as well as his hemoglobin.  Extensively discussed with Urology.     Interval History:  Patient seen and examined in ICU discussed with Urology and Nephrology.  After initiation of hemodialysis patient allowed blood work during dialysis which returned with a hemoglobin of 6.8.  He was typed and matched and transfused 2 units of packed blood cells on dialysis.  There is no evidence of bleeding.     Review of Systems   Constitutional:  Negative for fatigue and fever.   Respiratory:  Negative for chest tightness and shortness of breath.    Cardiovascular:  Negative for chest pain and palpitations.   Gastrointestinal:  Negative for abdominal pain, nausea and vomiting.   Genitourinary:  Positive for penile pain and penile swelling.   Neurological:  Negative for weakness and headaches.   Psychiatric/Behavioral:  Negative for agitation and confusion. The patient is not nervous/anxious.       Objective:      Vital Signs (Most Recent):  Temp: 97.8 °F (36.6 °C) (05/24/24 1200)  Pulse: 102 (05/24/24 0500)  Resp: (!) 26 (05/24/24 1332)  BP: (!) 120/54 (05/24/24 0500)  SpO2: (!) 93 % (05/24/24 0500) Vital Signs (24h Range):  Temp:  [97.6 °F (36.4 °C)-98.8 °F (37.1 °C)] 97.8 °F (36.6 °C)  Pulse:  [] 102  Resp:  [17-48] 26  SpO2:  [89 %-96 %] 93 %  BP: (112-132)/(49-63) 120/54      Weight: 104.5 kg (230 lb 6.1 oz)  Body mass index is 29.58 kg/m².     Intake/Output Summary (Last 24 hours) at 5/24/2024 1409  Last data filed at 5/24/2024 1330      Gross per 24 hour   Intake 571 ml   Output 1153 ml   Net -582 ml         Physical Exam  Vitals reviewed.   Constitutional:       Appearance: Normal appearance.   HENT:      Head: Normocephalic and atraumatic.      Mouth/Throat:      Mouth: Mucous membranes are moist.      Pharynx: Oropharynx is clear.   Eyes:      Extraocular Movements: Extraocular movements intact.      Conjunctiva/sclera: Conjunctivae normal.   Cardiovascular:      Rate and Rhythm: Tachycardia present.  Rhythm irregular.      Pulses: Normal pulses.      Heart sounds: Normal heart sounds.   Pulmonary:      Effort: Pulmonary effort is normal.      Breath sounds: Normal breath sounds.   Abdominal:      General: Bowel sounds are normal.      Palpations: Abdomen is soft.   Genitourinary:     Comments: Penile swelling   Musculoskeletal:         General: Normal range of motion.      Cervical back: Normal range of motion and neck supple.   Skin:     General: Skin is warm and dry.   Neurological:      General: No focal deficit present.      Mental Status: He is alert and oriented to person, place, and time. Mental status is at baseline.   Psychiatric:         Mood and Affect: Mood normal.         Behavior: Behavior normal.         Thought Content: Thought content normal.           Assessment/Plan:      * Drug-induced erectile dysfunction  Status post penile prosthesis  Discussed with Urology his progression is appropriate.      Transaminitis  Likely secondary to relative hypotensive episode.  We will statin and recheck in a.m..      Anemia of chronic disease  Patient's anemia is currently controlled. Has received 2 units of PRBCs on 5/22 . Etiology likely d/t acute blood loss which was from penile prosthesis site  Current CBC reviewed-   Lab Results   Component Value Date    HGB 7.9 (L) 05/25/2024    HCT 23.9 (L) 05/25/2024     Monitor serial CBC and transfuse if patient becomes hemodynamically unstable, symptomatic or H/H drops below 7/21.    Stat type and match and patient was transfused 2 units of packed blood cells on dialysis    GERD (gastroesophageal reflux disease)  Chronic. Stable. Currently asymptomatic. Home medications include PPI/Antacids as needed.  Plan:  -Continue PPI/Antacids as needed         Preoperative clearance  Admitted for heparin bridge from coumadin for scheduled penile prosthesis surgery to be performed by Dr. Shirley.  RCRI revealed class 4 risk with 15.0% 30 day risk of death, mi, cardiac  arrest.  Plan:  -pre-op labs and imaging  -lovenox bridge for coumadin  -manage co-morbidities   -cards consult for surgical clearance  -urology primary team      Hyperlipidemia  Patient is chronically on statin.will continue for now. Last Lipid Panel:   Lab Results   Component Value Date    CHOL 248 (H) 03/12/2024    HDL 29 (L) 03/12/2024    LDLCALC 170.0 (H) 03/12/2024    TRIG 245 (H) 03/12/2024    CHOLHDL 11.7 (L) 03/12/2024     Plan:  -transaminitis we will hold  -low fat/low calorie diet        Chronic hepatitis B  Patient has chronic liver disease due to hepatitis b. Their liver disease is compensated.       Atrial fibrillation  Patient with Paroxysmal (<7 days) atrial fibrillation which is controlled currently with Beta Blocker and Calcium Channel Blocker. Patient is currently in sinus rhythm. Anticoagulation Coumadin bridge with Lovenox.  Most recent INR 3.1 patient refused Coumadin yesterday agrees to take 5 mg today.    Chronic diastolic heart failure  Patient is identified as having Diastolic (HFpEF) heart failure that is Chronic. CHF is currently controlled. Latest ECHO performed and demonstrates- Results for orders placed during the hospital encounter of 05/02/24    Echo    Interpretation Summary    Left Ventricle: The left ventricle is normal in size. Normal wall thickness. There is concentric remodeling. Normal wall motion. Ejection fraction by visual approximation is 60%. Grade II diastolic dysfunction.    Right Ventricle: Normal right ventricular cavity size. Wall thickness is normal. Systolic function is normal.    Left Atrium: Left atrium is severely dilated.    Aortic Valve: There is a tilting disc prosthetic valve in the aortic position.    Mitral Valve: Moderately calcified leaflets. Moderately restricted motion. There is mild to moderate stenosis. MVA 2.6 cm2 by planimetry, 1.6 cm2 by PHT.  The mean pressure gradient across the mitral valve is 11 mmHg at a heart rate of  bpm. There is mild  regurgitation.    Tricuspid Valve: There is mild to moderate regurgitation.    Pulmonary Artery: There is pulmonary hypertension. The estimated pulmonary artery systolic pressure is 70 mmHg.    IVC/SVC: Normal venous pressure at 3 mmHg.   Place on fluid restriction of 1.5 L. Continue to stress to patient importance of self efficacy and  on diet for CHF.             ESRD (end stage renal disease) on dialysis   Nephrology consult for HD while in hospital.  Usual chronic maintenance hemodialysis Monday Wednesday Friday [facility]  Hemodialysis 5/24 with transfusion 2 units packed blood cells    History of mechanical aortic valve replacement  Plan:  -continue anticoagulation therapy with  Lovenox while in hospital  -tele monitoring  -cards consulted for surgical clearance  Patient on Coumadin with INR of 3.1 and no further need for Lovenox      Essential hypertension  Chronic, controlled. Latest blood pressure and vitals reviewed-     Temp:  [97.6 °F (36.4 °C)-98.8 °F (37.1 °C)]   Pulse:  []   Resp:  [16-28]   BP: ()/(46-59)   SpO2:  [62 %-98 %] .   Home meds for hypertension were reviewed and noted below.   Hypertension Medications               amLODIPine (NORVASC) 10 MG tablet Take 1 tablet by mouth daily    diltiaZEM (TIADYLT ER) 420 MG Cs24 Take 1 capsule (420 mg total) by mouth once daily.    hydrALAZINE (APRESOLINE) 100 MG tablet Take 1 tablet by mouth three times a day including dialysis days    labetaloL (NORMODYNE) 200 MG tablet Take 1 tablet (200 mg total) by mouth 3 (three) times daily.    lisinopriL (PRINIVIL,ZESTRIL) 40 MG tablet Take 1 tablet (40 mg total) by mouth once daily.                 While in the hospital, will manage blood pressure as follows; postop hypotension we will hold all meds with the exception of Cardizem 60mg q.6 hours    Will utilize p.r.n. blood pressure medication only if patient's blood pressure greater than 180/100 and he develops symptoms such as worsening  chest pain or shortness of breath.      VTE Risk Mitigation (From admission, onward)           Ordered     warfarin (COUMADIN) tablet 5 mg  Daily         05/25/24 1143     IP VTE HIGH RISK PATIENT  Once         05/19/24 2103     Place sequential compression device  Until discontinued         05/19/24 2103     Reason for No Pharmacological VTE Prophylaxis  Once        Question:  Reasons:  Answer:  Physician Provided (leave comment)  Comment:  pending surgical intervention    05/19/24 2103                    Discharge Planning   SARBJIT:      Code Status: Full Code   Is the patient medically ready for discharge?:     Reason for patient still in hospital (select all that apply): Patient trending condition and Laboratory test  Discharge Plan A: Home                  Federica Hu MD  Department of Hospital Medicine   [facility] - Med Surg

## 2024-05-25 NOTE — ASSESSMENT & PLAN NOTE
Patient with Paroxysmal (<7 days) atrial fibrillation which is controlled currently with Beta Blocker and Calcium Channel Blocker. Patient is currently in sinus rhythm. Anticoagulation Coumadin bridge with Lovenox.  Most recent INR 3.1 patient refused Coumadin yesterday agrees to take 5 mg today.

## 2024-05-25 NOTE — PROGRESS NOTES
Nephrology Progress Note     History of Present Illness   54 yr hx of end-stage renal disease presented to the ER directly admitted from Urology office for a scheduled penile prosthesis procedure.  Hospital Medicine was consulted to manage medical problems and to bridge with Lovenox and Coumadin for procedure.     His last dialysis outpatient was on Friday 05/17/2024.     Nephrology consulted to provide routine dialysis while here. His surgical procedure was complicated by excessive bleeding requiring blood transfusion.      Interval History     Overnight/currently:  Resting in bed denies shortness of breath admits to constipation on opiates.  Unhappy regarding dose of Cardizem not getting MARYBETH.  Called dialysis unit last dose of Mircera 05/06/2024.  More than 2 weeks ago we will give 1 dose of Procrit today explained to patient we will start senna for constipation defer adjustment of dose of Cardizem disposition to hospitalist.  Had dialysis yesterday.  Potassium 4.3 phosphorus 5.9 on binders calcium 8.9 H&H better 7.9.  Status post blood transfusion     Health Status   Allergies:    has No Known Allergies.    Current medications:     Current Facility-Administered Medications:     0.9%  NaCl infusion (for blood administration), , Intravenous, Q24H PRN, Bonifacio Shirley MD    0.9%  NaCl infusion (for blood administration), , Intravenous, Q24H PRN, Federica Chang MD    acetaminophen suppository 650 mg, 650 mg, Rectal, Q6H PRN, Jhon Rosas MD    albumin human 25% bottle 25 g, 25 g, Intravenous, PRN, Ronny Adan MD, Stopped at 05/22/24 1048    albuterol-ipratropium 2.5 mg-0.5 mg/3 mL nebulizer solution 3 mL, 3 mL, Nebulization, Q6H PRN, Jhon Rosas MD    aluminum-magnesium hydroxide-simethicone 200-200-20 mg/5 mL suspension 30 mL, 30 mL, Oral, QID PRN, Jhon Rosas MD    calcium acetate(phosphat bind) capsule 2,001 mg, 2,001 mg, Oral, TID WM, Nimesh Rosas, ALEX, 2,001  mg at 05/24/24 1115    dextrose 10% bolus 125 mL 125 mL, 12.5 g, Intravenous, PRN, Jhon Rosas MD    dextrose 10% bolus 250 mL 250 mL, 25 g, Intravenous, PRN, Jhon Rosas MD    diltiaZEM tablet 60 mg, 60 mg, Oral, Q6H, Federica Chang MD, 60 mg at 05/25/24 0021    glucagon (human recombinant) injection 1 mg, 1 mg, Intramuscular, PRN, Jhon Rosas MD    glucose chewable tablet 16 g, 16 g, Oral, PRN, Jhon Rosas MD    glucose chewable tablet 24 g, 24 g, Oral, PRN, Jhon Rosas MD    levoFLOXacin tablet 500 mg, 500 mg, Oral, Every other day, Bonifacio Shirley MD, 500 mg at 05/23/24 0812    melatonin tablet 6 mg, 6 mg, Oral, Nightly PRN, Jhon Rosas MD    morphine injection 2 mg, 2 mg, Intravenous, Q4H PRN, Jhon Rosas MD, 2 mg at 05/25/24 0023    multivitamin tablet, 1 tablet, Oral, Daily, Nimesh Rosas NP, 1 tablet at 05/24/24 0828    mupirocin 2 % ointment, , Nasal, BID, Bonifacio Shirley MD, Given at 05/24/24 2100    naloxone 0.4 mg/mL injection 0.02 mg, 0.02 mg, Intravenous, PRN, Jhon Rosas MD    ondansetron injection 4 mg, 4 mg, Intravenous, Q8H PRN, Jhon Rosas MD, 4 mg at 05/22/24 0948    oxyCODONE-acetaminophen  mg per tablet 1 tablet, 1 tablet, Oral, Q4H PRN, Bonifacio Shirley MD, 1 tablet at 05/24/24 2238    pantoprazole EC tablet 40 mg, 40 mg, Oral, Daily, Nimesh Rosas NP, 40 mg at 05/24/24 0828    pneumoc 20-jimi conj-dip cr(PF) (PREVNAR-20 (PF)) injection Syrg 0.5 mL, 0.5 mL, Intramuscular, vaccine x 1 dose, Monet Shaffer MD    promethazine tablet 25 mg, 25 mg, Oral, Q6H PRN, Jhon Rosas MD    senna-docusate 8.6-50 mg per tablet 1 tablet, 1 tablet, Oral, BID PRN, Jhon Rosas MD    sodium chloride 0.9% bolus 250 mL 250 mL, 250 mL, Intravenous, PRN, James Kelley MD    sodium chloride 0.9% flush 10 mL, 10 mL, Intravenous, Q12H PRN, Jhon Rosas MD    sodium  "zirconium cyclosilicate packet 10 g, 10 g, Oral, Every Tues, Thurs, Sat, Federica Chang MD    warfarin tablet 7.5 mg, 7.5 mg, Oral, Daily, Federica Chang MD    Facility-Administered Medications Ordered in Other Encounters:     0.9%  NaCl infusion, , Intravenous, Continuous, Christopher Jane MD    sodium chloride 0.9% flush 10 mL, 10 mL, Intravenous, PRN, Christopher Jane MD     Physical Examination   VS/Measurements   BP (!) 114/51   Pulse 83   Temp 97.6 °F (36.4 °C)   Resp 16   Ht 6' 2" (1.88 m)   Wt 104.5 kg (230 lb 6.1 oz)   SpO2 (!) 92%   BMI 29.58 kg/m²    General:  Alert and oriented X3, No acute distress.         Nutritional status:  Well-built  Neck:  Supple, No lymphadenopathy.     Respiratory:  Lungs are clear to auscultation, Respirations are non-labored, Symmetrical chest wall expansion.    Cardiovascular:  Normal rate, Regular rhythm.   Gastrointestinal:  Soft, Non-tender, Normal bowel sounds.    Genitourinary tract pressure bandages.  Integumentary:  Warm, Dry.   Left arm AV fistula no erythema induration  Psychiatric:  Cooperative, Appropriate mood & affect          Review / Management   Laboratory Results   Today's Lab Results :    Recent Results (from the past 24 hour(s))   Protime-INR    Collection Time: 05/24/24  9:50 AM   Result Value Ref Range    Prothrombin Time 31.5 (H) 9.0 - 12.5 sec    INR 3.1 (H) 0.8 - 1.2   Troponin I    Collection Time: 05/24/24  9:50 AM   Result Value Ref Range    Troponin I 0.069 (H) 0.000 - 0.026 ng/mL   CBC Auto Differential    Collection Time: 05/24/24  9:50 AM   Result Value Ref Range    WBC 10.75 3.90 - 12.70 K/uL    RBC 2.10 (L) 4.60 - 6.20 M/uL    Hemoglobin 6.8 (L) 14.0 - 18.0 g/dL    Hematocrit 19.8 (LL) 40.0 - 54.0 %    MCV 94 82 - 98 fL    MCH 32.4 (H) 27.0 - 31.0 pg    MCHC 34.3 32.0 - 36.0 g/dL    RDW 16.3 (H) 11.5 - 14.5 %    Platelets 129 (L) 150 - 450 K/uL    MPV 11.4 9.2 - 12.9 fL    Immature Granulocytes 0.5 0.0 - 0.5 %    Gran # " (ANC) 8.8 (H) 1.8 - 7.7 K/uL    Immature Grans (Abs) 0.05 (H) 0.00 - 0.04 K/uL    Lymph # 1.0 1.0 - 4.8 K/uL    Mono # 0.8 0.3 - 1.0 K/uL    Eos # 0.1 0.0 - 0.5 K/uL    Baso # 0.02 0.00 - 0.20 K/uL    nRBC 0 0 /100 WBC    Gran % 81.4 (H) 38.0 - 73.0 %    Lymph % 9.7 (L) 18.0 - 48.0 %    Mono % 7.2 4.0 - 15.0 %    Eosinophil % 1.0 0.0 - 8.0 %    Basophil % 0.2 0.0 - 1.9 %    Differential Method Automated    Comprehensive Metabolic Panel    Collection Time: 05/24/24  9:50 AM   Result Value Ref Range    Sodium 136 136 - 145 mmol/L    Potassium 4.6 3.5 - 5.1 mmol/L    Chloride 97 95 - 110 mmol/L    CO2 20 (L) 23 - 29 mmol/L    Glucose 91 70 - 110 mg/dL    BUN 83 (H) 6 - 20 mg/dL    Creatinine 13.7 (H) 0.5 - 1.4 mg/dL    Calcium 9.0 8.7 - 10.5 mg/dL    Total Protein 6.6 6.0 - 8.4 g/dL    Albumin 3.0 (L) 3.5 - 5.2 g/dL    Total Bilirubin 0.4 0.1 - 1.0 mg/dL    Alkaline Phosphatase 55 55 - 135 U/L    AST 98 (H) 10 - 40 U/L    ALT 98 (H) 10 - 44 U/L    eGFR 4 (A) >60 mL/min/1.73 m^2    Anion Gap 19 (H) 8 - 16 mmol/L   Vitamin B12    Collection Time: 05/24/24  9:50 AM   Result Value Ref Range    Vitamin B-12 >2000 (H) 210 - 950 pg/mL   Folate    Collection Time: 05/24/24  9:50 AM   Result Value Ref Range    Folate 8.0 4.0 - 24.0 ng/mL   Renal Function Panel    Collection Time: 05/24/24  9:50 AM   Result Value Ref Range    Glucose 92 70 - 110 mg/dL    Sodium 135 (L) 136 - 145 mmol/L    Potassium 4.6 3.5 - 5.1 mmol/L    Chloride 97 95 - 110 mmol/L    CO2 20 (L) 23 - 29 mmol/L    BUN 83 (H) 6 - 20 mg/dL    Calcium 9.0 8.7 - 10.5 mg/dL    Creatinine 13.6 (H) 0.5 - 1.4 mg/dL    Albumin 3.0 (L) 3.5 - 5.2 g/dL    Phosphorus 5.1 (H) 2.7 - 4.5 mg/dL    eGFR 4 (A) >60 mL/min/1.73 m^2    Anion Gap 18 (H) 8 - 16 mmol/L   Protime-INR    Collection Time: 05/25/24  6:33 AM   Result Value Ref Range    Prothrombin Time 31.6 (H) 9.0 - 12.5 sec    INR 3.1 (H) 0.8 - 1.2   CBC Auto Differential    Collection Time: 05/25/24  6:33 AM   Result  Value Ref Range    WBC 10.98 3.90 - 12.70 K/uL    RBC 2.58 (L) 4.60 - 6.20 M/uL    Hemoglobin 7.9 (L) 14.0 - 18.0 g/dL    Hematocrit 23.9 (L) 40.0 - 54.0 %    MCV 93 82 - 98 fL    MCH 30.6 27.0 - 31.0 pg    MCHC 33.1 32.0 - 36.0 g/dL    RDW 18.7 (H) 11.5 - 14.5 %    Platelets 129 (L) 150 - 450 K/uL    MPV 11.1 9.2 - 12.9 fL    Immature Granulocytes 0.3 0.0 - 0.5 %    Gran # (ANC) 8.3 (H) 1.8 - 7.7 K/uL    Immature Grans (Abs) 0.03 0.00 - 0.04 K/uL    Lymph # 1.0 1.0 - 4.8 K/uL    Mono # 1.6 (H) 0.3 - 1.0 K/uL    Eos # 0.1 0.0 - 0.5 K/uL    Baso # 0.02 0.00 - 0.20 K/uL    nRBC 0 0 /100 WBC    Gran % 75.7 (H) 38.0 - 73.0 %    Lymph % 8.8 (L) 18.0 - 48.0 %    Mono % 14.1 4.0 - 15.0 %    Eosinophil % 0.9 0.0 - 8.0 %    Basophil % 0.2 0.0 - 1.9 %    Differential Method Automated    Renal Function Panel    Collection Time: 05/25/24  6:33 AM   Result Value Ref Range    Glucose 111 (H) 70 - 110 mg/dL    Sodium 135 (L) 136 - 145 mmol/L    Potassium 4.3 3.5 - 5.1 mmol/L    Chloride 96 95 - 110 mmol/L    CO2 24 23 - 29 mmol/L    BUN 55 (H) 6 - 20 mg/dL    Calcium 8.9 8.7 - 10.5 mg/dL    Creatinine 11.3 (H) 0.5 - 1.4 mg/dL    Albumin 2.7 (L) 3.5 - 5.2 g/dL    Phosphorus 5.9 (H) 2.7 - 4.5 mg/dL    eGFR 5 (A) >60 mL/min/1.73 m^2    Anion Gap 15 8 - 16 mmol/L        Impression and Plan   Diagnosis   End-stage renal disease, Monday Wednesday Friday at [facility] on [address]-  --hep B,    -     Erectile dysfunction   -admitted for penile prosthesis surgery by Urology status post bleeding currently resolved H&H pending     History of mechanical aortic valve replacement  -Lovenox in the hospital     Chronic diastolic heart failure     Atrial fibrillation    --check EKG, give lopressor 5mg IV now resume Cardizem as at home     -last ejection fraction 60% with grade 2 diastolic dysfunction     Hypertension with chronic kidney disease   -controlled on current meds      Anemia of chronic kidney disease we will resume Procrit 8000 units today     --lost blood with surgery and getting PRBC today     Chronic hepatitis-B  -bedside dialysis for future treatments     Secondary hyperparathyroidism of renal origin  Okay to discharge from renal standpoint INR at goal    ______________________________________________  Ahsan Ford MD    This document was created using voice recognition software.  It is possible that there are errors which have persisted after original proofreading.  If there is a question regarding contents of this document please contact me for clarification.

## 2024-05-25 NOTE — PLAN OF CARE
Discussed poc with pt, pt verbalized understanding    Purposeful rounding every 2hours    VS wnl  Blood glucose monitoring   Fall precautions in place, remains injury free  Pt c/o pain  Pain under control with PRN meds    Bed locked at lowest position  Call light within reach    Chart check complete  Will cont with POC

## 2024-05-25 NOTE — PLAN OF CARE
Problem: Adult Inpatient Plan of Care  Goal: Plan of Care Review  5/25/2024 0343 by Cammie Matos RN  Outcome: Progressing  5/25/2024 0343 by Cammie Matos RN  Outcome: Progressing  5/25/2024 0343 by Cammie Matos RN  Outcome: Progressing  5/24/2024 2334 by Cammie Matos RN  Outcome: Progressing  Goal: Patient-Specific Goal (Individualized)  5/25/2024 0343 by Cammie Matos RN  Outcome: Progressing  5/25/2024 0343 by Cammie Matos RN  Outcome: Progressing  5/25/2024 0343 by Cammie Matos RN  Outcome: Progressing  5/24/2024 2334 by Cammie Matos RN  Outcome: Progressing  Goal: Absence of Hospital-Acquired Illness or Injury  5/25/2024 0343 by Cammie Matos RN  Outcome: Progressing  5/25/2024 0343 by Cammie Matos RN  Outcome: Progressing  5/25/2024 0343 by Cammie Matos RN  Outcome: Progressing  5/24/2024 2334 by Cammie Matos RN  Outcome: Progressing  Goal: Optimal Comfort and Wellbeing  5/25/2024 0343 by Cammie Matos RN  Outcome: Progressing  5/25/2024 0343 by Cammie Matos RN  Outcome: Progressing  5/25/2024 0343 by Cammie Matos RN  Outcome: Progressing  5/24/2024 2334 by Cammie Matos, RN  Outcome: Progressing  Goal: Readiness for Transition of Care  5/25/2024 0343 by Cammie Matos RN  Outcome: Progressing  5/25/2024 0343 by Cammie Matos RN  Outcome: Progressing  5/25/2024 0343 by Cammie Matos, RN  Outcome: Progressing  5/24/2024 2334 by Cammie Matos RN  Outcome: Progressing     Problem: Fall Injury Risk  Goal: Absence of Fall and Fall-Related Injury  5/25/2024 0343 by Cammie Matos RN  Outcome: Progressing  5/25/2024 0343 by Cammie Matos RN  Outcome: Progressing  5/25/2024 0343 by Cammie Matos RN  Outcome: Progressing  5/24/2024/2024 2334 by Cammie Matos, RN  Outcome: Progressing     Problem: Hemodialysis  Goal: Safe, Effective Therapy Delivery  5/25/2024 0343 by Cammie Matos, RN  Outcome: Progressing  5/25/2024 0343 by Cammie Matos, RN  Outcome: Progressing  5/25/2024 0343 by Shawna  Cammie, RN  Outcome: Progressing  5/24/2024 2334 by Cammie Matos, RN  Outcome: Progressing  Goal: Effective Tissue Perfusion  5/25/2024 0343 by Cammie Matos, RN  Outcome: Progressing  5/25/2024 0343 by Cammie Matos, RN  Outcome: Progressing  5/25/2024 0343 by Cammie Matos, RN  Outcome: Progressing  5/24/2024 2334 by aCmmie Matos, RN  Outcome: Progressing  Goal: Absence of Infection Signs and Symptoms  5/25/2024 0343 by Cammie Matos, RN  Outcome: Progressing  5/25/2024 0343 by Cammie Matos, RN  Outcome: Progressing  5/25/2024 0343 by Cammie Matos, RN  Outcome: Progressing  5/24/2024 2334 by Cammie Matos, RN  Outcome: Progressing     Problem: Wound  Goal: Optimal Coping  5/25/2024 0343 by Cammie Matos, RN  Outcome: Progressing  5/25/2024 0343 by Cammie Matos, RN  Outcome: Progressing  5/25/2024 0343 by Cammie Matos, RN  Outcome: Progressing  5/24/2024 2334 by Cammie Matos, RN  Outcome: Progressing  Goal: Optimal Functional Ability  5/25/2024 0343 by Cammie Matos, RN  Outcome: Progressing  5/25/2024 0343 by Cammie Matos, RN  Outcome: Progressing  5/25/2024 0343 by Cammie Matos, RN  Outcome: Progressing  5/24/2024 2334 by Cammie Matos, RN  Outcome: Progressing  Goal: Absence of Infection Signs and Symptoms  5/25/2024 0343 by Cammie Matos, RN  Outcome: Progressing  5/25/2024 0343 by Cammie Matos, RN  Outcome: Progressing  5/25/2024 0343 by Cammie Matos, RN  Outcome: Progressing  5/24/2024 2334 by Cammie Matos, RN  Outcome: Progressing  Goal: Improved Oral Intake  5/25/2024 0343 by Cammie Matos, RN  Outcome: Progressing  5/25/2024 0343 by Cammie Matos, RN  Outcome: Progressing  5/25/2024 0343 by Cammie Matos, RN  Outcome: Progressing  5/24/2024 2334 by Cammie Matos, RN  Outcome: Progressing  Goal: Optimal Pain Control and Function  5/25/2024 0343 by Cammie Matos RN  Outcome: Progressing  5/25/2024 0343 by Cammie Matos RN  Outcome: Progressing  5/25/2024 0343 by Cammie Matos, MEG  Outcome:  Progressing  5/24/2024 2334 by Cammie Matos RN  Outcome: Progressing  Goal: Skin Health and Integrity  5/25/2024 0343 by Cammie Matos RN  Outcome: Progressing  5/25/2024 0343 by Cammie Matos RN  Outcome: Progressing  5/25/2024 0343 by Cammie Matos RN  Outcome: Progressing  5/24/2024 2334 by Cammie Matos, RN  Outcome: Progressing  Goal: Optimal Wound Healing  5/25/2024 0343 by Cammie Matos RN  Outcome: Progressing  5/25/2024 0343 by Cammie Matos RN  Outcome: Progressing  5/25/2024 0343 by Cammie Matos RN  Outcome: Progressing  5/24/2024 2334 by Cammie Matos RN  Outcome: Progressing     Problem: Skin Injury Risk Increased  Goal: Skin Health and Integrity  5/25/2024 0343 by Cammie Matos RN  Outcome: Progressing  5/25/2024 0343 by Cammie Matos RN  Outcome: Progressing  5/25/2024 0343 by Cammie Matos RN  Outcome: Progressing  5/24/2024 2334 by Cammie Matos RN  Outcome: Progressing

## 2024-05-25 NOTE — PLAN OF CARE
Discussed poc with pt, pt verbalized understanding    Purposeful rounding every 2hours    VS wnl  Cardiac monitoring in use, pt is NSR, tele monitor # [id number]  Fall precautions in place, remains injury free  Pt c/o pain  Pain  under control with PRN meds      Bed locked at lowest position  Call light within reach    Chart check complete  Will cont with POC

## 2024-05-25 NOTE — PLAN OF CARE
Problem: Adult Inpatient Plan of Care  Goal: Plan of Care Review  5/25/2024 0343 by Cammie Matos RN  Outcome: Progressing  5/24/2024 2334 by Cammie Matos RN  Outcome: Progressing  Goal: Patient-Specific Goal (Individualized)  5/25/2024 0343 by Cammie Matos RN  Outcome: Progressing  5/24/2024 2334 by Cammie Matos RN  Outcome: Progressing  Goal: Absence of Hospital-Acquired Illness or Injury  5/25/2024 0343 by Cammie Matos RN  Outcome: Progressing  5/24/2024 2334 by Cammie Matos RN  Outcome: Progressing  Goal: Optimal Comfort and Wellbeing  5/25/2024 0343 by Cammie Matos RN  Outcome: Progressing  5/24/2024 2334 by Cammie Matos RN  Outcome: Progressing  Goal: Readiness for Transition of Care  5/25/2024 0343 by Cammie Matos RN  Outcome: Progressing  5/24/2024 2334 by Cammie Matos RN  Outcome: Progressing     Problem: Fall Injury Risk  Goal: Absence of Fall and Fall-Related Injury  5/25/2024 0343 by Cammie Matos RN  Outcome: Progressing  5/24/2024 2334 by Cammie Matos RN  Outcome: Progressing     Problem: Hemodialysis  Goal: Safe, Effective Therapy Delivery  5/25/2024 0343 by Cammie Matos RN  Outcome: Progressing  5/24/2024 2334 by Cammie Matos RN  Outcome: Progressing  Goal: Effective Tissue Perfusion  5/25/2024 0343 by Cammie Matos RN  Outcome: Progressing  5/24/2024 2334 by Cammie Matos RN  Outcome: Progressing  Goal: Absence of Infection Signs and Symptoms  5/25/2024 0343 by Cammie Matos RN  Outcome: Progressing  5/24/2024 2334 by Cammie Matos RN  Outcome: Progressing     Problem: Wound  Goal: Optimal Coping  5/25/2024 0343 by Cammie Matos RN  Outcome: Progressing  5/24/2024 2334 by Cammie Matos RN  Outcome: Progressing  Goal: Optimal Functional Ability  5/25/2024 0343 by Cammie Matos RN  Outcome: Progressing  5/24/2024 2334 by Shawna, Triniti, RN  Outcome: Progressing  Goal: Absence of Infection Signs and Symptoms  5/25/2024 0343 by Cammie Matos, RN  Outcome:  Progressing  5/24/2024 2334 by Cammie Matos RN  Outcome: Progressing  Goal: Improved Oral Intake  5/25/2024 0343 by Cammie Matos RN  Outcome: Progressing  5/24/2024 2334 by Cammie Matos RN  Outcome: Progressing  Goal: Optimal Pain Control and Function  5/25/2024 0343 by Cammie Matos RN  Outcome: Progressing  5/24/2024 2334 by Cammie Matos RN  Outcome: Progressing  Goal: Skin Health and Integrity  5/25/2024 0343 by Cammie Matos RN  Outcome: Progressing  5/24/2024 2334 by Cammie Matos RN  Outcome: Progressing  Goal: Optimal Wound Healing  5/25/2024 0343 by Cammie Matos RN  Outcome: Progressing  5/24/2024 2334 by Cammie Matos RN  Outcome: Progressing     Problem: Skin Injury Risk Increased  Goal: Skin Health and Integrity  5/25/2024 0343 by Cammie Matos RN  Outcome: Progressing  5/24/2024 2334 by Cammie Matos RN  Outcome: Progressing

## 2024-05-25 NOTE — PLAN OF CARE
Problem: Adult Inpatient Plan of Care  Goal: Plan of Care Review  5/25/2024 0343 by Cammie Matos RN  Outcome: Progressing  5/25/2024 0343 by Cammie Matos RN  Outcome: Progressing  5/24/2024 2334 by Cammie Matos RN  Outcome: Progressing  Goal: Patient-Specific Goal (Individualized)  5/25/2024 0343 by Cammie Matos RN  Outcome: Progressing  5/25/2024 0343 by Cammie Matos RN  Outcome: Progressing  5/24/2024 2334 by Cammie Matos RN  Outcome: Progressing  Goal: Absence of Hospital-Acquired Illness or Injury  5/25/2024 0343 by Cammie Matos RN  Outcome: Progressing  5/25/2024 0343 by Cammie Matos RN  Outcome: Progressing  5/24/2024 2334 by Cammie Matos RN  Outcome: Progressing  Goal: Optimal Comfort and Wellbeing  5/25/2024 0343 by Cammie Matos RN  Outcome: Progressing  5/25/2024 0343 by Cammie Matos RN  Outcome: Progressing  5/24/2024 2334 by Cammie Matos RN  Outcome: Progressing  Goal: Readiness for Transition of Care  5/25/2024 0343 by Cammie Matos RN  Outcome: Progressing  5/25/2024 0343 by Cammie Matos RN  Outcome: Progressing  5/24/2024 2334 by Cammie Matos RN  Outcome: Progressing     Problem: Fall Injury Risk  Goal: Absence of Fall and Fall-Related Injury  5/25/2024 0343 by Cammie Matos RN  Outcome: Progressing  5/25/2024 0343 by Cammie Matos RN  Outcome: Progressing  5/24/2024 2334 by Cammie Matos RN  Outcome: Progressing     Problem: Hemodialysis  Goal: Safe, Effective Therapy Delivery  5/25/2024 0343 by Cammie Matos RN  Outcome: Progressing  5/25/2024 0343 by Cammie Matos RN  Outcome: Progressing  5/24/2024 2334 by Cammie Matos RN  Outcome: Progressing  Goal: Effective Tissue Perfusion  5/25/2024 0343 by Cammie Matos RN  Outcome: Progressing  5/25/2024 0343 by Shawna, Triniti, RN  Outcome: Progressing  5/24/2024 2334 by Cammie Matos, RN  Outcome: Progressing  Goal: Absence of Infection Signs and Symptoms  5/25/2024 0343 by Cammie Matos, RN  Outcome:  Progressing  5/25/2024 0343 by Cammie Matos RN  Outcome: Progressing  5/24/2024 2334 by Cammie Matos RN  Outcome: Progressing     Problem: Wound  Goal: Optimal Coping  5/25/2024 0343 by Cammie Matos, RN  Outcome: Progressing  5/25/2024 0343 by Cammie Matos RN  Outcome: Progressing  5/24/2024 2334 by Cammie Matos, RN  Outcome: Progressing  Goal: Optimal Functional Ability  5/25/2024 0343 by Cammie Matos, RN  Outcome: Progressing  5/25/2024 0343 by Cammie Matos RN  Outcome: Progressing  5/24/2024 2334 by Cammie Matos RN  Outcome: Progressing  Goal: Absence of Infection Signs and Symptoms  5/25/2024 0343 by Cammie Matos, RN  Outcome: Progressing  5/25/2024 0343 by Cammie Matos, RN  Outcome: Progressing  5/24/2024 2334 by Cammie Matos, RN  Outcome: Progressing  Goal: Improved Oral Intake  5/25/2024 0343 by Cammie Matos RN  Outcome: Progressing  5/25/2024 0343 by Cammie Matos, RN  Outcome: Progressing  5/24/2024 2334 by Cammie Matos RN  Outcome: Progressing  Goal: Optimal Pain Control and Function  5/25/2024 0343 by Cammie Matos RN  Outcome: Progressing  5/25/2024 0343 by Cammie Matos, RN  Outcome: Progressing  5/24/2024 2334 by Cammie Matos, RN  Outcome: Progressing  Goal: Skin Health and Integrity  5/25/2024 0343 by Cammie Matos, RN  Outcome: Progressing  5/25/2024 0343 by Cammie Matos, RN  Outcome: Progressing  5/24/2024 2334 by Cammie Matos, RN  Outcome: Progressing  Goal: Optimal Wound Healing  5/25/2024 0343 by Cammie Matos, RN  Outcome: Progressing  5/25/2024 0343 by Cammie Matos, RN  Outcome: Progressing  5/24/2024 2334 by Cammie Matos, RN  Outcome: Progressing     Problem: Skin Injury Risk Increased  Goal: Skin Health and Integrity  5/25/2024 0343 by Cammie Matos, RN  Outcome: Progressing  5/25/2024 0343 by Cammie Matos, RN  Outcome: Progressing  5/24/2024 2334 by Cammie Matos, RN  Outcome: Progressing

## 2024-05-25 NOTE — PLAN OF CARE
Problem: Adult Inpatient Plan of Care  Goal: Plan of Care Review  Outcome: Progressing  Goal: Patient-Specific Goal (Individualized)  Outcome: Progressing  Goal: Absence of Hospital-Acquired Illness or Injury  Outcome: Progressing  Goal: Optimal Comfort and Wellbeing  Outcome: Progressing  Goal: Readiness for Transition of Care  Outcome: Progressing     Problem: Fall Injury Risk  Goal: Absence of Fall and Fall-Related Injury  Outcome: Progressing     Problem: Hemodialysis  Goal: Safe, Effective Therapy Delivery  Outcome: Progressing  Goal: Effective Tissue Perfusion  Outcome: Progressing  Goal: Absence of Infection Signs and Symptoms  Outcome: Progressing     Problem: Wound  Goal: Optimal Coping  Outcome: Progressing  Goal: Optimal Functional Ability  Outcome: Progressing  Goal: Absence of Infection Signs and Symptoms  Outcome: Progressing  Goal: Improved Oral Intake  Outcome: Progressing  Goal: Optimal Pain Control and Function  Outcome: Progressing  Goal: Skin Health and Integrity  Outcome: Progressing  Goal: Optimal Wound Healing  Outcome: Progressing     Problem: Skin Injury Risk Increased  Goal: Skin Health and Integrity  Outcome: Progressing

## 2024-05-25 NOTE — PROGRESS NOTES
Chief Complaint: erectile dysfunction, preop clearance    HPI:   5/25/24- currently doing well with no complaints.  5/24/24- patient is having some irritation of the glans, otherwise no change.  5/23/24- doing well, no bleeding noted.  5/22/24- is had persistent bleeding from around the drain, otherwise no perioperative issues.    5/20/24-  54 year old male with significant cardiac and renal disease.  ED not treated by conservative measures and here for preoperative clearance for his IPP in the AM.  Currently no symptoms or issues, due for dialysis today.      Allergies:  Patient has no known allergies.    Medications:  See MAR    Review of Systems:  General: No fever, chills, fatigability, or weight loss.  Skin: No rashes, itching, or changes in color or texture of skin.  Chest: Denies HAY, cyanosis, wheezing, cough, and sputum production.  Abdomen: Appetite fine. No weight loss. Denies diarrhea, abdominal pain, hematemesis, or blood in stool.  Musculoskeletal: No joint stiffness or swelling. Denies back pain.  : As above.  All other review of systems negative.    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    PSH:   has a past surgical history that includes ASD repair; Cardiac valuve replacement (02/08/2017); Radiofrequency ablation (03/13/2017); Cardiac catheterization; AV Graft Creation (Left, 03/2017); Colonoscopy (N/A, 8/27/2019); Esophagogastroduodenoscopy (N/A, 8/27/2019); Removal of graft (Left, 7/2/2020); Colonoscopy (N/A, 9/3/2020); Right heart  catheterization (Right, 8/12/2021); and Insertion of inflatable penile prosthesis (N/A, 5/21/2024).    FamHx: family history includes Anesthesia problems in his paternal uncle; Cancer in his mother; Diabetes in his paternal aunt and paternal aunt; Heart attack in his father; Heart failure in his paternal grandmother; Hypertension in his paternal aunt; Leukemia in his mother and paternal aunt; No Known Problems in his brother, sister, sister, and sister; Stroke in his paternal aunt; Suicide in his paternal uncle; Valvular heart disease in his maternal aunt.    SocHx:  reports that he has never smoked. He has never used smokeless tobacco. He reports that he does not drink alcohol and does not use drugs.      Physical Exam:  Vitals:    05/25/24 0846   BP:    Pulse:    Resp: 16   Temp:      General: A&Ox3, no apparent distress, no deformities  Neck: No masses, normal ROM  Lungs: normal inspiration, no use of accessory muscles  Heart: normal pulse, no arrhythmias  Abdomen: Soft, NT, ND, no masses, no hernias, no hepatosplenomegaly  Skin: The skin is warm and dry. No jaundice.  Ext: No c/c/e.  :  5/24/24- pump is in good position, tips in the glans.  Swelling appropriate, no drainage from scrotum.  Paraphimotic and reduced appropriately, no drainage from the scrotum.    Labs/Studies:   CBC demonstrates an H&H of 7.9 in 23.9 respectively.    BMP demonstrates a BUN and creatinine of 55 and 11.3 respectively.    Impression/Plan:     ED-  s/p IPP  POD #4    -stable and possible discharge to home if labs are stable in the morning.  -swelling is appropriate.  -ice packs continuously on scrotum to reduce edema, in addition scrotal elevation.

## 2024-05-25 NOTE — ASSESSMENT & PLAN NOTE
Chronic, controlled. Latest blood pressure and vitals reviewed-     Temp:  [97.6 °F (36.4 °C)-98.8 °F (37.1 °C)]   Pulse:  []   Resp:  [16-28]   BP: ()/(46-59)   SpO2:  [62 %-98 %] .   Home meds for hypertension were reviewed and noted below.   Hypertension Medications               amLODIPine (NORVASC) 10 MG tablet Take 1 tablet by mouth daily    diltiaZEM (TIADYLT ER) 420 MG Cs24 Take 1 capsule (420 mg total) by mouth once daily.    hydrALAZINE (APRESOLINE) 100 MG tablet Take 1 tablet by mouth three times a day including dialysis days    labetaloL (NORMODYNE) 200 MG tablet Take 1 tablet (200 mg total) by mouth 3 (three) times daily.    lisinopriL (PRINIVIL,ZESTRIL) 40 MG tablet Take 1 tablet (40 mg total) by mouth once daily.                 While in the hospital, will manage blood pressure as follows; postop hypotension we will hold all meds with the exception of Cardizem 60mg q.6 hours    Will utilize p.r.n. blood pressure medication only if patient's blood pressure greater than 180/100 and he develops symptoms such as worsening chest pain or shortness of breath.

## 2024-05-26 VITALS
HEIGHT: 74 IN | HEART RATE: 97 BPM | TEMPERATURE: 99 F | SYSTOLIC BLOOD PRESSURE: 109 MMHG | BODY MASS INDEX: 29.57 KG/M2 | OXYGEN SATURATION: 96 % | RESPIRATION RATE: 18 BRPM | DIASTOLIC BLOOD PRESSURE: 53 MMHG | WEIGHT: 230.38 LBS

## 2024-05-26 LAB
ALBUMIN SERPL BCP-MCNC: 2.7 G/DL (ref 3.5–5.2)
ALP SERPL-CCNC: 63 U/L (ref 55–135)
ALT SERPL W/O P-5'-P-CCNC: 46 U/L (ref 10–44)
ANION GAP SERPL CALC-SCNC: 16 MMOL/L (ref 8–16)
AST SERPL-CCNC: 60 U/L (ref 10–40)
BASOPHILS # BLD AUTO: 0.01 K/UL (ref 0–0.2)
BASOPHILS NFR BLD: 0.1 % (ref 0–1.9)
BILIRUB SERPL-MCNC: 0.5 MG/DL (ref 0.1–1)
BUN SERPL-MCNC: 75 MG/DL (ref 6–20)
CALCIUM SERPL-MCNC: 8.9 MG/DL (ref 8.7–10.5)
CHLORIDE SERPL-SCNC: 95 MMOL/L (ref 95–110)
CO2 SERPL-SCNC: 26 MMOL/L (ref 23–29)
CREAT SERPL-MCNC: 14.3 MG/DL (ref 0.5–1.4)
DIFFERENTIAL METHOD BLD: ABNORMAL
EOSINOPHIL # BLD AUTO: 0.2 K/UL (ref 0–0.5)
EOSINOPHIL NFR BLD: 2.2 % (ref 0–8)
ERYTHROCYTE [DISTWIDTH] IN BLOOD BY AUTOMATED COUNT: 18.1 % (ref 11.5–14.5)
EST. GFR  (NO RACE VARIABLE): 4 ML/MIN/1.73 M^2
GLUCOSE SERPL-MCNC: 110 MG/DL (ref 70–110)
HCT VFR BLD AUTO: 24.2 % (ref 40–54)
HGB BLD-MCNC: 7.9 G/DL (ref 14–18)
IMM GRANULOCYTES # BLD AUTO: 0.05 K/UL (ref 0–0.04)
IMM GRANULOCYTES NFR BLD AUTO: 0.5 % (ref 0–0.5)
INR PPP: 2.2 (ref 0.8–1.2)
LYMPHOCYTES # BLD AUTO: 0.8 K/UL (ref 1–4.8)
LYMPHOCYTES NFR BLD: 7.5 % (ref 18–48)
MCH RBC QN AUTO: 30.3 PG (ref 27–31)
MCHC RBC AUTO-ENTMCNC: 32.6 G/DL (ref 32–36)
MCV RBC AUTO: 93 FL (ref 82–98)
MONOCYTES # BLD AUTO: 1.4 K/UL (ref 0.3–1)
MONOCYTES NFR BLD: 13.1 % (ref 4–15)
NEUTROPHILS # BLD AUTO: 8.3 K/UL (ref 1.8–7.7)
NEUTROPHILS NFR BLD: 76.6 % (ref 38–73)
NRBC BLD-RTO: 0 /100 WBC
PLATELET # BLD AUTO: 138 K/UL (ref 150–450)
PMV BLD AUTO: 10.7 FL (ref 9.2–12.9)
POTASSIUM SERPL-SCNC: 4.3 MMOL/L (ref 3.5–5.1)
PROT SERPL-MCNC: 6.1 G/DL (ref 6–8.4)
PROTHROMBIN TIME: 22.9 SEC (ref 9–12.5)
RBC # BLD AUTO: 2.61 M/UL (ref 4.6–6.2)
SODIUM SERPL-SCNC: 137 MMOL/L (ref 136–145)
WBC # BLD AUTO: 10.88 K/UL (ref 3.9–12.7)

## 2024-05-26 PROCEDURE — 99232 SBSQ HOSP IP/OBS MODERATE 35: CPT | Mod: ,,, | Performed by: INTERNAL MEDICINE

## 2024-05-26 PROCEDURE — 80053 COMPREHEN METABOLIC PANEL: CPT | Performed by: INTERNAL MEDICINE

## 2024-05-26 PROCEDURE — 36415 COLL VENOUS BLD VENIPUNCTURE: CPT | Performed by: UROLOGY

## 2024-05-26 PROCEDURE — 25000003 PHARM REV CODE 250: Performed by: INTERNAL MEDICINE

## 2024-05-26 PROCEDURE — 25000003 PHARM REV CODE 250: Performed by: NURSE PRACTITIONER

## 2024-05-26 PROCEDURE — 25000003 PHARM REV CODE 250: Performed by: UROLOGY

## 2024-05-26 PROCEDURE — 85610 PROTHROMBIN TIME: CPT | Performed by: UROLOGY

## 2024-05-26 PROCEDURE — 63600175 PHARM REV CODE 636 W HCPCS: Mod: JZ,JG | Performed by: HOSPITALIST

## 2024-05-26 PROCEDURE — 85025 COMPLETE CBC W/AUTO DIFF WBC: CPT

## 2024-05-26 RX ORDER — WARFARIN 7.5 MG/1
7.5 TABLET ORAL DAILY
Qty: 45 TABLET | Refills: 6 | Status: SHIPPED | OUTPATIENT
Start: 2024-05-26 | End: 2025-05-26

## 2024-05-26 RX ORDER — OXYCODONE AND ACETAMINOPHEN 5; 325 MG/1; MG/1
1 TABLET ORAL EVERY 4 HOURS PRN
Qty: 50 TABLET | Refills: 0 | Status: SHIPPED | OUTPATIENT
Start: 2024-05-26 | End: 2024-06-04 | Stop reason: SDUPTHER

## 2024-05-26 RX ORDER — WARFARIN SODIUM 5 MG/1
5 TABLET ORAL DAILY
Qty: 30 TABLET | Refills: 0 | Status: SHIPPED | OUTPATIENT
Start: 2024-05-26 | End: 2024-07-03

## 2024-05-26 RX ORDER — WARFARIN 7.5 MG/1
TABLET ORAL
Qty: 45 TABLET | Refills: 6 | Status: SHIPPED | OUTPATIENT
Start: 2024-05-26

## 2024-05-26 RX ORDER — WARFARIN 7.5 MG/1
7.5 TABLET ORAL DAILY
Qty: 45 TABLET | Refills: 6 | Status: SHIPPED | OUTPATIENT
Start: 2024-05-26 | End: 2024-05-26

## 2024-05-26 RX ORDER — LEVOFLOXACIN 500 MG/1
500 TABLET, FILM COATED ORAL DAILY
Qty: 4 TABLET | Refills: 0 | Status: SHIPPED | OUTPATIENT
Start: 2024-05-26 | End: 2024-05-30

## 2024-05-26 RX ADMIN — MORPHINE SULFATE 2 MG: 2 INJECTION, SOLUTION INTRAMUSCULAR; INTRAVENOUS at 12:05

## 2024-05-26 RX ADMIN — DILTIAZEM HYDROCHLORIDE 60 MG: 30 TABLET, FILM COATED ORAL at 05:05

## 2024-05-26 RX ADMIN — MUPIROCIN: 20 OINTMENT TOPICAL at 08:05

## 2024-05-26 RX ADMIN — PANTOPRAZOLE SODIUM 40 MG: 40 TABLET, DELAYED RELEASE ORAL at 08:05

## 2024-05-26 RX ADMIN — DILTIAZEM HYDROCHLORIDE 60 MG: 30 TABLET, FILM COATED ORAL at 12:05

## 2024-05-26 RX ADMIN — CALCIUM ACETATE 2001 MG: 667 CAPSULE ORAL at 12:05

## 2024-05-26 RX ADMIN — MORPHINE SULFATE 2 MG: 2 INJECTION, SOLUTION INTRAMUSCULAR; INTRAVENOUS at 01:05

## 2024-05-26 RX ADMIN — CALCIUM ACETATE 2001 MG: 667 CAPSULE ORAL at 08:05

## 2024-05-26 RX ADMIN — MORPHINE SULFATE 2 MG: 2 INJECTION, SOLUTION INTRAMUSCULAR; INTRAVENOUS at 08:05

## 2024-05-26 RX ADMIN — THERA TABS 1 TABLET: TAB at 08:05

## 2024-05-26 NOTE — PLAN OF CARE
Problem: Adult Inpatient Plan of Care  Goal: Plan of Care Review  Outcome: Met  Goal: Patient-Specific Goal (Individualized)  Outcome: Met  Goal: Absence of Hospital-Acquired Illness or Injury  Outcome: Met  Goal: Optimal Comfort and Wellbeing  Outcome: Met  Goal: Readiness for Transition of Care  Outcome: Met     Problem: Fall Injury Risk  Goal: Absence of Fall and Fall-Related Injury  Outcome: Met     Problem: Hemodialysis  Goal: Safe, Effective Therapy Delivery  Outcome: Met  Goal: Effective Tissue Perfusion  Outcome: Met  Goal: Absence of Infection Signs and Symptoms  Outcome: Met     Problem: Wound  Goal: Optimal Coping  Outcome: Met  Goal: Optimal Functional Ability  Outcome: Met  Goal: Absence of Infection Signs and Symptoms  Outcome: Met  Goal: Improved Oral Intake  Outcome: Met  Goal: Optimal Pain Control and Function  Outcome: Met  Goal: Skin Health and Integrity  Outcome: Met  Goal: Optimal Wound Healing  Outcome: Met     Problem: Skin Injury Risk Increased  Goal: Skin Health and Integrity  Outcome: Met

## 2024-05-26 NOTE — PROGRESS NOTES
[facility] Medicine  Progress Note    Patient Name: Isidro White Jr.  MRN: [id number]  Patient Class: IP- Inpatient   Admission Date: 5/19/2024  Length of Stay: 7 days  Attending Physician: Bonifacio Shirley MD  Primary Care Provider: Boston Nevarez MD        Subjective:     Principal Problem:Drug-induced erectile dysfunction        HPI:  Isidro White Jr. is a 54 y.o. 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  Presented as a direct admit from Dr. Shirley with urology for scheduled penile prosthesis procedure.  Hospital Medicine consulted to manage medical problems and to bridge Lovenox and Coumadin for procedure.  Patient denies any complaints at this time.    PCP: Boston Nevarez    Overview/Hospital Course:  Patient is a 51-year-old male with a history of end-stage renal disease, anemia, aortic valve stenosis, AFib flutter, CHF, GERD, chronic hep B, hypertension, DANIEL, secondary hyperparathyroidism who presented as a direct admit from the urology service for scheduled penile prosthesis.  Patient was on Coumadin due to his mechanical valve and therefore he is undergoing Lovenox Coumadin bridge for procedure.    Underwent procedure 5/21 without complications.  Evening of surgery patient began to have bleeding from the insertion site of the GERARD drain.  Urology stopped the bleeding at bedside and redressed it.  He continued to have blood draining approximately 300 cc with clots past.  A drain was subsequently removed and  the site was closed with a suture per Urology.  Patient reports feeling bad on morning of 5/22.  Blood pressure was down.  He had episode of vomiting.  Labs returned with a 3 point drop in hemoglobin and potassium 7.2.  Patient was initiated on hemodialysis received 1000 cc of saline and 25 g of albumin.  Patient's blood pressure stabilized about 90.  However he went into AFib with RVR.  He was given a small dose of metoprolol and heart rate was not controlled.  Dialysis was discontinued after 2 hours.  And patient was transferred to the ICU for closer monitoring.  In ICU patient received 2 units of packed blood cells.  Repeat labs revealed a potassium before 0.7 hemoglobin of 7.3.  Antihypertensives were held with the exception of restarting Cardizem 30 mg q.6 hours.    Postop day 2. Patient remained stable, with stable vital signs and blood pressure still marginal.  He was continued on the Cardizem 30 mg q.6 hours and remained in sinus rhythm.  No further bleeding.  Patient refused blood draws this a.m..    Patient is stable overnight however this morning his labs returned with a INR 3.1.  White blood cell count of 10.75 hemoglobin 6.8 platelet count of 129 electrolytes stable.  2 units packed blood cells typed and matched stat and transfused on dialysis.  During dialysis patient had episodes of AFib he was given IV Cardizem with some improvement in his heart rate down to the 1 teens.  Patient extensively counseled on need for at least daily blood work to be able to manage his Coumadin as well as his hemoglobin.  Extensively discussed with Urology.    Patient transitioned out of ICU.  No further bleeding noted.  His blood pressure still remains marginal likely secondary to euvolemia while in the hospital.  Long discussion with patient about medications.  Due to relative hypotension patient's Cardizem  has been changed to 60 mg q.6 hours.  Discussed with Dr. Shirley again.  Prosthesis in good location with  no compromise and no evidence of bleeding.  We have decided to check patient's hemoglobin again in a.m. and if stable then likely we will discharge.  Patient has refused Coumadin due to his INR being above 3.  He reports that his therapeutic level is 2-3 although with a mechanical valve in AFib 2.5-3.5 likely should be his level.  He agrees to take 5 mg today.  We discussed risk of thrombosis valve.    Patient continues to refuse Lovenox and Coumadin as he desires.  Again he was counseled on the need to get an INR in the 2.5-3.5 range.  Stressed the importance of taking Coumadin as prescribed and following up with Coumadin clinic.    No new subjective & objective note has been filed under this hospital service since the last note was generated.      Review of Systems   Constitutional:  Negative for fatigue and fever.   Respiratory:  Negative for chest tightness and shortness of breath.    Cardiovascular:  Negative for chest pain and palpitations.   Gastrointestinal:  Negative for abdominal pain, nausea and vomiting.   Genitourinary:  Positive for penile pain and penile swelling.   Neurological:  Negative for weakness and headaches.   Psychiatric/Behavioral:  Negative for agitation and confusion. The patient is not nervous/anxious       [facility] ([facility])  [facility] Medicine  Progress Note     Patient Name: Isidro White Jr.  MRN: [id number]  Patient Class: IP- Inpatient     Admission Date: 5/19/2024  Length of Stay: 5 days  Attending Physician: Bonifacio Shirley MD  Primary Care Provider: Boston Nevarez MD           Subjective:      Principal Problem:Drug-induced erectile dysfunction           HPI:  Isidro White Jr. is a 54 y.o. male with a PMH  has a past medical history of Anemia, Aortic valve stenosis, Atrial fibrillation, Atrial flutter, Cardiomyopathy, CHF (congestive heart failure), Drug-induced erectile dysfunction, ESRD due to hypertension, ESRD on dialysis, GERD  (gastroesophageal reflux disease), Hepatitis B, Hyperlipidemia, Hypertension, Nightmares, Obesity, DANIEL (obstructive sleep apnea) (11/12/2019), Secondary hyperparathyroidism of renal origin, Supraventricular tachycardia, Tachycardia, and Valvular regurgitation.  Presented as a direct admit from Dr. Shirley with urology for scheduled penile prosthesis procedure.  Hospital Medicine consulted to manage medical problems and to bridge Lovenox and Coumadin for procedure.  Patient denies any complaints at this time.     PCP: Boston Nevarez     Overview/Hospital Course:  Patient is a 51-year-old male with a history of end-stage renal disease, anemia, aortic valve stenosis, AFib flutter, CHF, GERD, chronic hep B, hypertension, DANEIL, secondary hyperparathyroidism who presented as a direct admit from the urology service for scheduled penile prosthesis.  Patient was on Coumadin due to his mechanical valve and therefore he is undergoing Lovenox Coumadin bridge for procedure.     Underwent procedure 5/21 without complications.  Evening of surgery patient began to have bleeding from the insertion site of the GERARD drain.  Urology stopped the bleeding at bedside and redressed it.  He continued to have blood draining approximately 300 cc with clots past.  A drain was subsequently removed and the site was closed with a suture per Urology.  Patient reports feeling bad on morning of 5/22.  Blood pressure was down.  He had episode of vomiting.  Labs returned with a 3 point drop in hemoglobin and potassium 7.2.  Patient was initiated on hemodialysis received 1000 cc of saline and 25 g of albumin.  Patient's blood pressure stabilized about 90.  However he went into AFib with RVR.  He was given a small dose of metoprolol and heart rate was not controlled.  Dialysis was discontinued after 2 hours.  And patient was transferred to the ICU for closer monitoring.  In ICU patient received 2 units of packed blood cells.  Repeat labs revealed a  potassium before 0.7 hemoglobin of 7.3.  Antihypertensives were held with the exception of restarting Cardizem 30 mg q.6 hours.     Postop day 2. Patient remained stable, with stable vital signs and blood pressure still marginal.  He was continued on the Cardizem 30 mg q.6 hours and remained in sinus rhythm.  No further bleeding.  Patient refused blood draws this a.m..     Patient is stable overnight however this morning his labs returned with a INR 3.1.  White blood cell count of 10.75 hemoglobin 6.8 platelet count of 129 electrolytes stable.  2 units packed blood cells typed and matched stat and transfused on dialysis.  During dialysis patient had episodes of AFib he was given IV Cardizem with some improvement in his heart rate down to the 1 teens.  Patient extensively counseled on need for at least daily blood work to be able to manage his Coumadin as well as his hemoglobin.  Extensively discussed with Urology.     Interval History:  Patient seen and examined in ICU discussed with Urology and Nephrology.  After initiation of hemodialysis patient allowed blood work during dialysis which returned with a hemoglobin of 6.8.  He was typed and matched and transfused 2 units of packed blood cells on dialysis.  There is no evidence of bleeding.     Review of Systems   Constitutional:  Negative for fatigue and fever.   Respiratory:  Negative for chest tightness and shortness of breath.    Cardiovascular:  Negative for chest pain and palpitations.   Gastrointestinal:  Negative for abdominal pain, nausea and vomiting.   Genitourinary:  Positive for penile pain and penile swelling.   Neurological:  Negative for weakness and headaches.   Psychiatric/Behavioral:  Negative for agitation and confusion. The patient is not nervous/anxious.       Objective:      Vital Signs (Most Recent):  Temp: 97.8 °F (36.6 °C) (05/24/24 1200)  Pulse: 102 (05/24/24 0500)  Resp: (!) 26 (05/24/24 1332)  BP: (!) 120/54 (05/24/24 0500)  SpO2: (!) 93  % (05/24/24 0500) Vital Signs (24h Range):  Temp:  [97.6 °F (36.4 °C)-98.8 °F (37.1 °C)] 97.8 °F (36.6 °C)  Pulse:  [] 102  Resp:  [17-48] 26  SpO2:  [89 %-96 %] 93 %  BP: (112-132)/(49-63) 120/54      Weight: 104.5 kg (230 lb 6.1 oz)  Body mass index is 29.58 kg/m².     Intake/Output Summary (Last 24 hours) at 5/24/2024 1409  Last data filed at 5/24/2024 1330      Gross per 24 hour   Intake 571 ml   Output 1153 ml   Net -582 ml         Physical Exam  Vitals reviewed.   Constitutional:       Appearance: Normal appearance.   HENT:      Head: Normocephalic and atraumatic.      Mouth/Throat:      Mouth: Mucous membranes are moist.      Pharynx: Oropharynx is clear.   Eyes:      Extraocular Movements: Extraocular movements intact.      Conjunctiva/sclera: Conjunctivae normal.   Cardiovascular:      Rate and Rhythm: Tachycardia present. Rhythm irregular.      Pulses: Normal pulses.      Heart sounds: Normal heart sounds.   Pulmonary:      Effort: Pulmonary effort is normal.      Breath sounds: Normal breath sounds.   Abdominal:      General: Bowel sounds are normal.      Palpations: Abdomen is soft.   Genitourinary:     Comments: Penile swelling   Musculoskeletal:         General: Normal range of motion.      Cervical back: Normal range of motion and neck supple.   Skin:     General: Skin is warm and dry.   Neurological:      General: No focal deficit present.      Mental Status: He is alert and oriented to person, place, and time. Mental status is at baseline.   Psychiatric:         Mood and Affect: Mood normal.         Behavior: Behavior normal.         Thought Content: Thought content normal.           Assessment/Plan:      * Drug-induced erectile dysfunction  Status post penile prosthesis  Discussed with Urology his progression is appropriate.      Transaminitis  Likely secondary to relative hypotensive episode.  We will statin and recheck in a.m..      Anemia of chronic disease  Patient's anemia is currently  controlled. Has received 2 units of PRBCs on 5/22 . Etiology likely d/t acute blood loss which was from penile prosthesis site  Current CBC reviewed-   Lab Results   Component Value Date    HGB 7.9 (L) 05/25/2024    HCT 23.9 (L) 05/25/2024     Monitor serial CBC and transfuse if patient becomes hemodynamically unstable, symptomatic or H/H drops below 7/21.    Stat type and match and patient was transfused 2 units of packed blood cells on dialysis    GERD (gastroesophageal reflux disease)  Chronic. Stable. Currently asymptomatic. Home medications include PPI/Antacids as needed.  Plan:  -Continue PPI/Antacids as needed         Preoperative clearance  Admitted for heparin bridge from coumadin for scheduled penile prosthesis surgery to be performed by Dr. Shirley.  RCRI revealed class 4 risk with 15.0% 30 day risk of death, mi, cardiac arrest.  Plan:  -pre-op labs and imaging  -lovenox bridge for coumadin  -manage co-morbidities   -cards consult for surgical clearance  -urology primary team      Hyperlipidemia  Patient is chronically on statin.will continue for now. Last Lipid Panel:   Lab Results   Component Value Date    CHOL 248 (H) 03/12/2024    HDL 29 (L) 03/12/2024    LDLCALC 170.0 (H) 03/12/2024    TRIG 245 (H) 03/12/2024    CHOLHDL 11.7 (L) 03/12/2024     Plan:  -transaminitis we will hold  -low fat/low calorie diet        Chronic hepatitis B  Patient has chronic liver disease due to hepatitis b. Their liver disease is compensated.       Atrial fibrillation  Patient with Paroxysmal (<7 days) atrial fibrillation which is controlled currently with Beta Blocker and Calcium Channel Blocker. Patient is currently in sinus rhythm. Anticoagulation Coumadin bridge with Lovenox.  Most recent INR 3.1 patient refused Coumadin yesterday agrees to take 5 mg today.    Chronic diastolic heart failure  Patient is identified as having Diastolic (HFpEF) heart failure that is Chronic. CHF is currently controlled. Latest ECHO  performed and demonstrates- Results for orders placed during the hospital encounter of 05/02/24    Echo    Interpretation Summary    Left Ventricle: The left ventricle is normal in size. Normal wall thickness. There is concentric remodeling. Normal wall motion. Ejection fraction by visual approximation is 60%. Grade II diastolic dysfunction.    Right Ventricle: Normal right ventricular cavity size. Wall thickness is normal. Systolic function is normal.    Left Atrium: Left atrium is severely dilated.    Aortic Valve: There is a tilting disc prosthetic valve in the aortic position.    Mitral Valve: Moderately calcified leaflets. Moderately restricted motion. There is mild to moderate stenosis. MVA 2.6 cm2 by planimetry, 1.6 cm2 by PHT.  The mean pressure gradient across the mitral valve is 11 mmHg at a heart rate of  bpm. There is mild regurgitation.    Tricuspid Valve: There is mild to moderate regurgitation.    Pulmonary Artery: There is pulmonary hypertension. The estimated pulmonary artery systolic pressure is 70 mmHg.    IVC/SVC: Normal venous pressure at 3 mmHg.   Place on fluid restriction of 1.5 L. Continue to stress to patient importance of self efficacy and  on diet for CHF.             ESRD (end stage renal disease) on dialysis   Nephrology consult for HD while in hospital.  Usual chronic maintenance hemodialysis Monday Wednesday Friday [facility]  Hemodialysis 5/24 with transfusion 2 units packed blood cells    History of mechanical aortic valve replacement  Plan:  -continue anticoagulation therapy with  Lovenox while in hospital  -tele monitoring  -cards consulted for surgical clearance  Patient on Coumadin with INR of 3.1 and no further need for Lovenox      Essential hypertension  Chronic, controlled. Latest blood pressure and vitals reviewed-     Temp:  [97.6 °F (36.4 °C)-98.8 °F (37.1 °C)]   Pulse:  []   Resp:  [16-28]   BP: ()/(46-59)   SpO2:  [62 %-98 %] .   Home meds for  hypertension were reviewed and noted below.   Hypertension Medications               amLODIPine (NORVASC) 10 MG tablet Take 1 tablet by mouth daily    diltiaZEM (TIADYLT ER) 420 MG Cs24 Take 1 capsule (420 mg total) by mouth once daily.    hydrALAZINE (APRESOLINE) 100 MG tablet Take 1 tablet by mouth three times a day including dialysis days    labetaloL (NORMODYNE) 200 MG tablet Take 1 tablet (200 mg total) by mouth 3 (three) times daily.    lisinopriL (PRINIVIL,ZESTRIL) 40 MG tablet Take 1 tablet (40 mg total) by mouth once daily.                 While in the hospital, will manage blood pressure as follows; postop hypotension we will hold all meds with the exception of Cardizem 60mg q.6 hours    Will utilize p.r.n. blood pressure medication only if patient's blood pressure greater than 180/100 and he develops symptoms such as worsening chest pain or shortness of breath.      VTE Risk Mitigation (From admission, onward)           Ordered     warfarin (COUMADIN) tablet 5 mg  Daily         05/25/24 1143     IP VTE HIGH RISK PATIENT  Once         05/19/24 2103     Place sequential compression device  Until discontinued         05/19/24 2103     Reason for No Pharmacological VTE Prophylaxis  Once        Question:  Reasons:  Answer:  Physician Provided (leave comment)  Comment:  pending surgical intervention    05/19/24 2103                    Discharge Planning   SARBJIT: 5/26/2024     Code Status: Full Code   Is the patient medically ready for discharge?:     Reason for patient still in hospital (select all that apply): Other (specify) patient to be discharged home today  Discharge Plan A: Home                  Federica Hu MD  Department of Hospital Medicine   [facility]

## 2024-05-26 NOTE — PROGRESS NOTES
Chief Complaint: erectile dysfunction, preop clearance    HPI:   5/26/24- doing well and swelling has reduced.  5/25/24- currently doing well with no complaints.  5/24/24- patient is having some irritation of the glans, otherwise no change.  5/23/24- doing well, no bleeding noted.  5/22/24- is had persistent bleeding from around the drain, otherwise no perioperative issues.    5/20/24-  54 year old male with significant cardiac and renal disease.  ED not treated by conservative measures and here for preoperative clearance for his IPP in the AM.  Currently no symptoms or issues, due for dialysis today.      Allergies:  Patient has no known allergies.    Medications:  See MAR    Review of Systems:  General: No fever, chills, fatigability, or weight loss.  Skin: No rashes, itching, or changes in color or texture of skin.  Chest: Denies HAY, cyanosis, wheezing, cough, and sputum production.  Abdomen: Appetite fine. No weight loss. Denies diarrhea, abdominal pain, hematemesis, or blood in stool.  Musculoskeletal: No joint stiffness or swelling. Denies back pain.  : As above.  All other review of systems negative.    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    PSH:   has a past surgical history that includes ASD repair; Cardiac valuve replacement (02/08/2017); Radiofrequency ablation (03/13/2017); Cardiac catheterization; AV Graft Creation (Left, 03/2017); Colonoscopy (N/A, 8/27/2019); Esophagogastroduodenoscopy (N/A, 8/27/2019); Removal of graft (Left, 7/2/2020);  Colonoscopy (N/A, 9/3/2020); Right heart catheterization (Right, 8/12/2021); and Insertion of inflatable penile prosthesis (N/A, 5/21/2024).    FamHx: family history includes Anesthesia problems in his paternal uncle; Cancer in his mother; Diabetes in his paternal aunt and paternal aunt; Heart attack in his father; Heart failure in his paternal grandmother; Hypertension in his paternal aunt; Leukemia in his mother and paternal aunt; No Known Problems in his brother, sister, sister, and sister; Stroke in his paternal aunt; Suicide in his paternal uncle; Valvular heart disease in his maternal aunt.    SocHx:  reports that he has never smoked. He has never used smokeless tobacco. He reports that he does not drink alcohol and does not use drugs.      Physical Exam:  Vitals:    05/26/24 0833   BP:    Pulse:    Resp: 18   Temp:      General: A&Ox3, no apparent distress, no deformities  Neck: No masses, normal ROM  Lungs: normal inspiration, no use of accessory muscles  Heart: normal pulse, no arrhythmias  Abdomen: Soft, NT, ND, no masses, no hernias, no hepatosplenomegaly  Skin: The skin is warm and dry. No jaundice.  Ext: No c/c/e.  :  5/26/24- pump is in good position, tips in the glans.  Swelling appropriate, no drainage from scrotum.    Labs/Studies:   CBC demonstrates an H&H of 7.9 and 24.2 respectively.    BMP demonstrates a BUN and creatinine of 75 and 14.3 respectively.    Impression/Plan:     ED-  s/p IPP  POD #5    -stable and discharge to home.  -swelling is reduced.  -ice packs continuously on scrotum to reduce edema, in addition scrotal elevation.  -follow up has been arranged.

## 2024-05-26 NOTE — PROGRESS NOTES
Pharmacy Consult Note: Warfarin     Isidro Lozano Cindy Diaz 's Coumadin will be dosed and monitored by Pharmacy.      Target INR goal is 2-3    INR   Date Value Ref Range Status   05/26/2024 2.2 (H) 0.8 - 1.2 Final     Comment:     Coumadin Therapy:  2.0 - 3.0 for INR for all indicators except mechanical heart valves  and antiphospholipid syndromes which should use 2.5 - 3.5.         Indication: mechanical AVR, Afib   Home dose: 7.5 mg every Mon, Wed, Fri & 11.25 mg all other days   Patient will be continued on a dose of 5 mg per day.    Dose for Today: 5 mg     PT/INR will be monitored daily. Dose adjustments will be made accordingly.      Thank you for allowing us to participate in this patient's care.     Ana Garcia 5/26/2024 9:56 AM

## 2024-05-26 NOTE — PLAN OF CARE
[facility] - Med Surg  Discharge Final Note    Primary Care Provider: Boston Nevarez MD    Expected Discharge Date: 5/26/2024    Final Discharge Note (most recent)       Final Note - 05/26/24 1009          Final Note    Assessment Type Final Discharge Note     Anticipated Discharge Disposition Home or Self Care        Post-Acute Status    Discharge Delays None known at this time                     Important Message from Medicare         Discharge home, no home health or dme orders noted.

## 2024-05-26 NOTE — DISCHARGE SUMMARY
Discharge Summary  Urology    Admit Date: 5/19/2024    Discharge Date: 05/26/2024    Discharge Attending Physician: Bonifacio Shirley MD     Diagnoses:  Active Hospital Problems    Diagnosis  POA    *Drug-induced erectile dysfunction [N52.2]  Yes    Anemia of chronic disease [D63.8]  Yes    Transaminitis [R74.01]  No    GERD (gastroesophageal reflux disease) [K21.9]  Yes    Chronic hepatitis B [B18.1]  Yes    Hyperlipidemia [E78.5]  Yes    Atrial fibrillation [I48.91]  Yes    Chronic diastolic heart failure [I50.32]  Yes     Non ischemic DCM      ESRD (end stage renal disease) on dialysis [N18.6, Z99.2]  Not Applicable     Chronic    History of mechanical aortic valve replacement [Z95.2]  Not Applicable    Essential hypertension [I10]  Yes      Resolved Hospital Problems   No resolved problems to display.       Hospital Course:  54-year-old gentleman who was admitted preoperatively on May 19th for stabilization.  Nephrology and Cardiology were consulted, Medicine was also consulted to assist with the care.  Patient was initiated on dialysis hospital day 1.  Hospital day 2.  On May 21st patient was taken the operating room for insertion of inflatable penile prosthesis.  Patient initially did well, with no issues.  Unfortunately had some postoperative bleeding around his drain site, patient's most likely lost significant amount of blood and required 3 units transfused over the next 2 days.  Due to his reaction during dialysis postoperative day 1. Patient was taken to the ICU for acute monitoring.  Patient remains in the ICU until 524 when he was deemed stable to transfer back to the floor.  Patient's hemoglobin hematocrit will follow closely, no evidence residual bleeding after the drain was removed and stitched on proximally 05/23/2024.  Hemoglobin hematocrit demonstrates stability though over the evening to his discharge date of 05/26/2024.  Patient has swelling had reduced upon discharge, patient's INR was  stable on his home dose of Coumadin.  During hospitalization Nephrology maintain care cystic with dialysis.  Hospital medicine had to monitor his blood pressure and pulse modifying his home medications, please see the chart note.  Either way patient was tolerating p.o. diet and pain medications by postoperative day number 5 and deemed stable for discharge to home.  Patient will continue another 4 days of Levaquin, pain medication has been given, he has been instructed on how to take his Coumadin until he can returned to his Coumadin clinic next week.  Patient will go back to his usual dialysis clinic tomorrow.    Consults: Cardiology, Hospital Medicine, and Nephrology    Pertinent Diagnostic Studies and Procedures:  Insertion of inflatable penile prosthesis 05/21/2024, dialysis.    Discharged Condition: good    Disposition: Home or Self Care    Follow-up Plans:      Recent Labs:  Recent Results (from the past 336 hour(s))   CBC Auto Differential    Collection Time: 05/26/24  5:14 AM   Result Value Ref Range    WBC 10.88 3.90 - 12.70 K/uL    Hemoglobin 7.9 (L) 14.0 - 18.0 g/dL    Hematocrit 24.2 (L) 40.0 - 54.0 %    Platelets 138 (L) 150 - 450 K/uL   CBC Auto Differential    Collection Time: 05/25/24  6:33 AM   Result Value Ref Range    WBC 10.98 3.90 - 12.70 K/uL    Hemoglobin 7.9 (L) 14.0 - 18.0 g/dL    Hematocrit 23.9 (L) 40.0 - 54.0 %    Platelets 129 (L) 150 - 450 K/uL   CBC Auto Differential    Collection Time: 05/24/24  9:50 AM   Result Value Ref Range    WBC 10.75 3.90 - 12.70 K/uL    Hemoglobin 6.8 (L) 14.0 - 18.0 g/dL    Hematocrit 19.8 (LL) 40.0 - 54.0 %    Platelets 129 (L) 150 - 450 K/uL     No results found for this or any previous visit (from the past 336 hour(s)).  Lab Results   Component Value Date    HGBA1C 4.9 03/12/2024       Discharge Medication List:     Medication List        START taking these medications      levoFLOXacin 500 MG tablet  Commonly known as: LEVAQUIN  Take 1 tablet (500 mg  total) by mouth once daily. for 4 days     oxyCODONE-acetaminophen 5-325 mg per tablet  Commonly known as: PERCOCET  Take 1 tablet by mouth every 4 (four) hours as needed for Pain.            CHANGE how you take these medications      hydrOXYzine HCL 25 MG tablet  Commonly known as: ATARAX  take one tablet by mouth daily as needed for itching  What changed: Another medication with the same name was removed. Continue taking this medication, and follow the directions you see here.     * warfarin 7.5 MG tablet  Commonly known as: COUMADIN  Take 2 tablets by mouth on Tuesday, Thursdays, and Saturday; and 1 tablet on all other days of the week or as directed by coumadin clinic.  What changed: Another medication with the same name was added. Make sure you understand how and when to take each.     * warfarin 5 MG tablet  Commonly known as: COUMADIN  Take 1 tablet (5 mg total) by mouth Daily.  What changed: You were already taking a medication with the same name, and this prescription was added. Make sure you understand how and when to take each.     * warfarin 7.5 MG tablet  Commonly known as: COUMADIN  Take 1 tablet (7.5 mg) on Mondays, Wednesdays and Fridays; and 1 and 1/2 tablets (11.25 mg) all other days OR AS DIRECTED BY COUMADIN CLINIC  What changed: Another medication with the same name was added. Make sure you understand how and when to take each.           * This list has 3 medication(s) that are the same as other medications prescribed for you. Read the directions carefully, and ask your doctor or other care provider to review them with you.                CONTINUE taking these medications      albuterol 90 mcg/actuation inhaler  Commonly known as: VENTOLIN HFA  Inhale 2 puffs into the lungs every 6 (six) hours as needed for Wheezing or Shortness of Breath. Rescue     ascorbic acid (vitamin C) 500 MG tablet  Commonly known as: VITAMIN C  Take 1 tablet (500 mg total) by mouth 2 (two) times daily.     * calcium  acetate(phosphat bind) 667 mg tablet  Commonly known as: PHOSLO  Take 3 capsules by mouth three times daily with meals AND take 1 capsule by mouth twice daily with snacks     * calcium acetate(phosphat bind) 667 mg capsule  Commonly known as: PHOSLO  Take 3 capsules by mouth 3 times daily with meals and 1 capsule by mouth twice daily with snacks     ferrous sulfate 324 mg (65 mg iron) Tbec     multivitamin with minerals tablet     omega-3 fatty acids-vitamin E 1,000 mg Cap  Commonly known as: Fish OiL  Take 1 capsule by mouth once daily.     pantoprazole 40 MG tablet  Commonly known as: PROTONIX  Take 1 tablet by mouth once daily     ETHAN-RACQUEL 0.8 mg Tab  Generic drug: B complex-vitamin C-folic acid  Take 1 tablet by mouth once daily     rosuvastatin 10 MG tablet  Commonly known as: CRESTOR  Take 1 tablet (10 mg total) by mouth once daily.     * tenofovir disoproxil fumarate 300 mg Tab  Commonly known as: VIREAD  TAKE 1 TABLET BY MOUTH ONCE WEEKLY     * tenofovir disoproxil fumarate 300 mg Tab  Commonly known as: VIREAD  Take 1 tablet (300 mg total) by mouth once a week.     TIADYLT  mg Cs24  Generic drug: diltiaZEM  Take 1 capsule (420 mg total) by mouth once daily.     vitamin D 1000 units Tab  Commonly known as: VITAMIN D3  Take 1 tablet by mouth daily           * This list has 4 medication(s) that are the same as other medications prescribed for you. Read the directions carefully, and ask your doctor or other care provider to review them with you.                STOP taking these medications      amLODIPine 10 MG tablet  Commonly known as: NORVASC     b complex vitamins tablet     epoetin vikki 10,000 unit/mL injection  Commonly known as: PROCRIT     hydrALAZINE 100 MG tablet  Commonly known as: APRESOLINE     labetaloL 200 MG tablet  Commonly known as: NORMODYNE     lisinopriL 40 MG tablet  Commonly known as: PRINIVIL,ZESTRIL     PEP INJECTION (FOR RX USE)     PERITONEAL DIALYSIS SOLUTION IP     sildenafiL  100 MG tablet  Commonly known as: VIAGRA               Where to Get Your Medications        These medications were sent to [facility]  [id number] [place] [address] [place] [address]      Hours: Mon-Fri, 8a-5:30p Phone: [phone]   warfarin 5 MG tablet  warfarin 7.5 MG tablet       These medications were sent to [id number]  [place] - [address]  [address] [address]      Phone: [phone]   levoFLOXacin 500 MG tablet  oxyCODONE-acetaminophen 5-325 mg per tablet  warfarin 7.5 MG tablet           Discharge Procedure Orders   Diet renal     Other restrictions (specify):   Order Comments: Keep ice packs on scrotum and elevate     Notify your health care provider if you experience any of the following:  temperature >100.4     Notify your health care provider if you experience any of the following:  persistent nausea and vomiting or diarrhea     Notify your health care provider if you experience any of the following:  severe uncontrolled pain     Activity as tolerated       An excess of 30 minutes was spent discharging this patient.

## 2024-05-26 NOTE — PROGRESS NOTES
Nephrology Progress Note     History of Present Illness   54 yr hx of end-stage renal disease presented to the ER directly admitted from Urology office for a scheduled penile prosthesis procedure.  Hospital Medicine was consulted to manage medical problems and to bridge with Lovenox and Coumadin for procedure.     His last dialysis outpatient was on Friday 05/17/2024.     Nephrology consulted to provide routine dialysis while here. His surgical procedure was complicated by excessive bleeding requiring blood transfusion.      Interval History     Overnight/currently:  Resting in bed no shortness of breath H&H stable.  No bleeding at dialysis yesterday without problems outpatient dialysis Monday Wednesday Friday if still here will plan dialysis here we will discuss with hospitalist discharge planning     Health Status   Allergies:    has No Known Allergies.    Current medications:     Current Facility-Administered Medications:     0.9%  NaCl infusion (for blood administration), , Intravenous, Q24H PRN, Bonifacio Shirley MD    0.9%  NaCl infusion (for blood administration), , Intravenous, Q24H PRN, Federica Chang MD    acetaminophen suppository 650 mg, 650 mg, Rectal, Q6H PRN, Jhon Rosas MD    albumin human 25% bottle 25 g, 25 g, Intravenous, PRN, Ronny Adan MD, Stopped at 05/22/24 1048    albuterol-ipratropium 2.5 mg-0.5 mg/3 mL nebulizer solution 3 mL, 3 mL, Nebulization, Q6H PRN, Jhon Rosas MD    aluminum-magnesium hydroxide-simethicone 200-200-20 mg/5 mL suspension 30 mL, 30 mL, Oral, QID PRN, Jhon Rosas MD    calcium acetate(phosphat bind) capsule 2,001 mg, 2,001 mg, Oral, TID WM, Nimesh Rosas NP, 2,001 mg at 05/25/24 1655    dextrose 10% bolus 125 mL 125 mL, 12.5 g, Intravenous, PRN, Jhon Rosas MD    dextrose 10% bolus 250 mL 250 mL, 25 g, Intravenous, PRN, Jhon Rosas MD    diltiaZEM tablet 60 mg, 60 mg, Oral, Q6H, Federica Chang  MD FLORIDALMA, 60 mg at 05/26/24 0513    glucagon (human recombinant) injection 1 mg, 1 mg, Intramuscular, PRN, Jhon Rosas MD    glucose chewable tablet 16 g, 16 g, Oral, PRN, Jhon Rosas MD    glucose chewable tablet 24 g, 24 g, Oral, PRN, Jhon Rosas MD    melatonin tablet 6 mg, 6 mg, Oral, Nightly PRN, Jhon Rosas MD    morphine injection 2 mg, 2 mg, Intravenous, Q4H PRN, Jhon Rosas MD, 2 mg at 05/26/24 0015    multivitamin tablet, 1 tablet, Oral, Daily, Nimesh Rosas NP, 1 tablet at 05/25/24 0847    mupirocin 2 % ointment, , Nasal, BID, Bonifacio Shirley MD, Given at 05/25/24 2045    naloxone 0.4 mg/mL injection 0.02 mg, 0.02 mg, Intravenous, PRN, Jhon Rosas MD    ondansetron injection 4 mg, 4 mg, Intravenous, Q8H PRN, Jhon Rosas MD, 4 mg at 05/22/24 0948    oxyCODONE-acetaminophen  mg per tablet 1 tablet, 1 tablet, Oral, Q4H PRN, Bonifacio Shirley MD, 1 tablet at 05/25/24 1655    pantoprazole EC tablet 40 mg, 40 mg, Oral, Daily, Nimesh Rosas NP, 40 mg at 05/25/24 0847    pneumoc 20-jimi conj-dip cr(PF) (PREVNAR-20 (PF)) injection Syrg 0.5 mL, 0.5 mL, Intramuscular, vaccine x 1 dose, Monet Shaffer MD    promethazine tablet 25 mg, 25 mg, Oral, Q6H PRN, Jhon Rosas MD    senna-docusate 8.6-50 mg per tablet 1 tablet, 1 tablet, Oral, BID PRN, Jhon Rosas MD    sodium chloride 0.9% bolus 250 mL 250 mL, 250 mL, Intravenous, PRN, James Kelley MD    sodium chloride 0.9% flush 10 mL, 10 mL, Intravenous, Q12H PRN, Jhon Rosas MD    sodium zirconium cyclosilicate packet 10 g, 10 g, Oral, Every Tues, Thurs, Sat, Federica Chang MD, 10 g at 05/25/24 0847    warfarin (COUMADIN) tablet 5 mg, 5 mg, Oral, Daily, Federica Chang MD    Facility-Administered Medications Ordered in Other Encounters:     0.9%  NaCl infusion, , Intravenous, Continuous, Christopher Jane MD    sodium chloride 0.9%  "flush 10 mL, 10 mL, Intravenous, PRN, Christopher Jane MD     Physical Examination   VS/Measurements   BP (!) 109/53 (Patient Position: Lying)   Pulse 91   Temp 98.6 °F (37 °C) (Oral)   Resp 18   Ht 6' 2" (1.88 m)   Wt 104.5 kg (230 lb 6.1 oz)   SpO2 95%   BMI 29.58 kg/m²    General:  Alert and oriented X3, No acute distress.         Nutritional status:  Well-built  Neck:  Supple, No lymphadenopathy.     Respiratory:  Lungs are clear to auscultation, Respirations are non-labored, Symmetrical chest wall expansion.    Cardiovascular:  Normal rate, Regular rhythm.   Gastrointestinal:  Soft, Non-tender, Normal bowel sounds.    Genitourinary tract pressure bandages.  Integumentary:  Warm, Dry.   Left arm AV fistula no erythema induration  Psychiatric:  Cooperative, Appropriate mood & affect  .        Review / Management   Laboratory Results   Today's Lab Results :    Recent Results (from the past 24 hour(s))   Protime-INR    Collection Time: 05/26/24  5:14 AM   Result Value Ref Range    Prothrombin Time 22.9 (H) 9.0 - 12.5 sec    INR 2.2 (H) 0.8 - 1.2   CBC Auto Differential    Collection Time: 05/26/24  5:14 AM   Result Value Ref Range    WBC 10.88 3.90 - 12.70 K/uL    RBC 2.61 (L) 4.60 - 6.20 M/uL    Hemoglobin 7.9 (L) 14.0 - 18.0 g/dL    Hematocrit 24.2 (L) 40.0 - 54.0 %    MCV 93 82 - 98 fL    MCH 30.3 27.0 - 31.0 pg    MCHC 32.6 32.0 - 36.0 g/dL    RDW 18.1 (H) 11.5 - 14.5 %    Platelets 138 (L) 150 - 450 K/uL    MPV 10.7 9.2 - 12.9 fL    Immature Granulocytes 0.5 0.0 - 0.5 %    Gran # (ANC) 8.3 (H) 1.8 - 7.7 K/uL    Immature Grans (Abs) 0.05 (H) 0.00 - 0.04 K/uL    Lymph # 0.8 (L) 1.0 - 4.8 K/uL    Mono # 1.4 (H) 0.3 - 1.0 K/uL    Eos # 0.2 0.0 - 0.5 K/uL    Baso # 0.01 0.00 - 0.20 K/uL    nRBC 0 0 /100 WBC    Gran % 76.6 (H) 38.0 - 73.0 %    Lymph % 7.5 (L) 18.0 - 48.0 %    Mono % 13.1 4.0 - 15.0 %    Eosinophil % 2.2 0.0 - 8.0 %    Basophil % 0.1 0.0 - 1.9 %    Differential Method Automated    Comprehensive " Metabolic Panel    Collection Time: 05/26/24  5:14 AM   Result Value Ref Range    Sodium 137 136 - 145 mmol/L    Potassium 4.3 3.5 - 5.1 mmol/L    Chloride 95 95 - 110 mmol/L    CO2 26 23 - 29 mmol/L    Glucose 110 70 - 110 mg/dL    BUN 75 (H) 6 - 20 mg/dL    Creatinine 14.3 (H) 0.5 - 1.4 mg/dL    Calcium 8.9 8.7 - 10.5 mg/dL    Total Protein 6.1 6.0 - 8.4 g/dL    Albumin 2.7 (L) 3.5 - 5.2 g/dL    Total Bilirubin 0.5 0.1 - 1.0 mg/dL    Alkaline Phosphatase 63 55 - 135 U/L    AST 60 (H) 10 - 40 U/L    ALT 46 (H) 10 - 44 U/L    eGFR 4 (A) >60 mL/min/1.73 m^2    Anion Gap 16 8 - 16 mmol/L        Impression and Plan   Diagnosis   End-stage renal disease, Monday Wednesday Friday at [facility]-  --hep B,    -     Erectile dysfunction   -admitted for penile prosthesis surgery by Urology status post bleeding currently resolved H&H pending     History of mechanical aortic valve replacement  -Lovenox in the hospital     Chronic diastolic heart failure     Atrial fibrillation    --check EKG, give lopressor 5mg IV now resume Cardizem as at home     -last ejection fraction 60% with grade 2 diastolic dysfunction     Hypertension with chronic kidney disease   -controlled on current meds      Anemia of chronic kidney disease we will resume Procrit 8000 units today    --lost blood with surgery and getting PRBC today     Chronic hepatitis-B  -bedside dialysis for future treatments     Secondary hyperparathyroidism of renal origin  Okay to discharge from renal standpoint INR at goal      ______________________________________________  Ahsan Ford MD    This document was created using voice recognition software.  It is possible that there are errors which have persisted after original proofreading.  If there is a question regarding contents of this document please contact me for clarification.

## 2024-05-27 NOTE — PROGRESS NOTES
D/C 5/25/26-pt d/c post surgery penile implant.  Pt did have bleeding episode during hospital stay and was transferred to ICU, 3 units of blood.  See calendar for inpatient dosing and NEW warfarin dose.

## 2024-05-28 ENCOUNTER — PATIENT OUTREACH (OUTPATIENT)
Dept: ADMINISTRATIVE | Facility: CLINIC | Age: 54
End: 2024-05-28
Payer: MEDICARE

## 2024-05-28 NOTE — PROGRESS NOTES
C3 nurse spoke with Isidro White Jr. for a TCC post hospital discharge follow up call. The patient has a scheduled HOSFU appointment with Boston Nevarez MD on 06/06/24 @ 0800.

## 2024-05-29 ENCOUNTER — TELEPHONE (OUTPATIENT)
Dept: CARDIOLOGY | Facility: CLINIC | Age: 54
End: 2024-05-29
Payer: MEDICARE

## 2024-05-29 NOTE — TELEPHONE ENCOUNTER
Pt reports weakness and fatigue today.  He was not able to report to his coumadin clinic visit yesterday.  I expressed my concern regarding bleeding and needing an INR.  He denies any bleeding for surgical site.  Pt reports that he will go to HD today but is feeling very weak.  I did schedule an INR in  tomorrow for follow up.  I have recommended ER for evalution, patient disconnected call.

## 2024-06-03 DIAGNOSIS — B18.1 CHRONIC VIRAL HEPATITIS B WITHOUT DELTA AGENT AND WITHOUT COMA: ICD-10-CM

## 2024-06-03 RX ORDER — TENOFOVIR DISOPROXIL FUMARATE 300 MG/1
300 TABLET, FILM COATED ORAL WEEKLY
Qty: 12 TABLET | Refills: 0 | OUTPATIENT
Start: 2024-06-03

## 2024-06-03 RX ORDER — LABETALOL 200 MG/1
TABLET, FILM COATED ORAL
Qty: 90 TABLET | Refills: 6 | Status: SHIPPED | OUTPATIENT
Start: 2024-06-03

## 2024-06-04 ENCOUNTER — ANTI-COAG VISIT (OUTPATIENT)
Dept: CARDIOLOGY | Facility: CLINIC | Age: 54
End: 2024-06-04
Payer: MEDICARE

## 2024-06-04 ENCOUNTER — TELEPHONE (OUTPATIENT)
Dept: CARDIOLOGY | Facility: CLINIC | Age: 54
End: 2024-06-04

## 2024-06-04 ENCOUNTER — OFFICE VISIT (OUTPATIENT)
Dept: CARDIOLOGY | Facility: CLINIC | Age: 54
End: 2024-06-04
Payer: MEDICARE

## 2024-06-04 DIAGNOSIS — Z95.2 HISTORY OF MECHANICAL AORTIC VALVE REPLACEMENT: ICD-10-CM

## 2024-06-04 DIAGNOSIS — Z79.01 ON COUMADIN FOR ATRIAL FIBRILLATION: ICD-10-CM

## 2024-06-04 DIAGNOSIS — Z79.01 LONG TERM (CURRENT) USE OF ANTICOAGULANTS: Primary | ICD-10-CM

## 2024-06-04 DIAGNOSIS — I48.91 ON COUMADIN FOR ATRIAL FIBRILLATION: ICD-10-CM

## 2024-06-04 DIAGNOSIS — Z95.2 HISTORY OF MECHANICAL AORTIC VALVE REPLACEMENT: Primary | ICD-10-CM

## 2024-06-04 LAB
CTP QC/QA: YES
INR PPP: 4.8 (ref 2–3)

## 2024-06-04 PROCEDURE — 99499 UNLISTED E&M SERVICE: CPT | Mod: S$PBB,,, | Performed by: INTERNAL MEDICINE

## 2024-06-04 PROCEDURE — 85610 PROTHROMBIN TIME: CPT | Mod: PBBFAC

## 2024-06-04 PROCEDURE — 99999PBSHW POCT INR: Mod: PBBFAC,,,

## 2024-06-04 PROCEDURE — 93793 ANTICOAG MGMT PT WARFARIN: CPT | Mod: ,,,

## 2024-06-04 RX ORDER — OXYCODONE AND ACETAMINOPHEN 5; 325 MG/1; MG/1
1 TABLET ORAL EVERY 4 HOURS PRN
Qty: 30 TABLET | Refills: 0 | Status: SHIPPED | OUTPATIENT
Start: 2024-06-04 | End: 2024-06-05 | Stop reason: SDUPTHER

## 2024-06-04 NOTE — TELEPHONE ENCOUNTER
----- Message from Clarisa Echevarria sent at 6/4/2024  8:30 AM CDT -----  .Type:  RX Refill Request    Who Called: .Isidro White Jr.   Refill or New Rx:refill  RX Name and Strength:oxyCODONE-acetaminophen (PERCOCET) 5-325 mg per tablet  How is the patient currently taking it? (ex. 1XDay):daily  Is this a 30 day or 90 day RX:    Preferred Pharmacy with phone number:.  [facility]     Local or Mail Order:local  Ordering Provider:  Would the patient rather a call back or a response via [facility]? Call back  Best Call Back Number:.[phone]   Additional Information: he need stronger dosage, 10 mg, he was advised to take 2 and Dr. Shirley advised him he would refill if he ran out.

## 2024-06-04 NOTE — PROGRESS NOTES
INR not at goal. Medications, chart, and patient findings reviewed. See calendar for adjustments to dose and follow up plan. Warfarin dose was significantly lowered at d/c 2 weeks ago.  Pt did not report to [facility] last week for his follow up.  His INR today is elevated even with this large decrease in dose.  Hold dose today for elevation.  Lower weekly dose to 5 mg QD, repeat INR in 1 week.

## 2024-06-04 NOTE — PROGRESS NOTES
Subjective:   Patient ID:  Isidro White Jr. is a 54 y.o. male who presents for cardiac consult of No chief complaint on file.          The patient came in today for cardiac consult of No chief complaint on file.      Isidro White Jr. is a 54 y.o. male pt with PAF, ASD closure at age 8 ([facility]), HFpEF, s/p AVR with a 22 mm mechanical valve and TV annuloplasty with a 28 mm ring 3/17, HTN, PHTN, Mitral stenosis, SVT, EP performed RFA (3/13/17) and has been on HD - MWF since Feb 8,2016 here for CV follow up.     4/9/24  Penile prosthetic implant cancelled in Feb due to bleeding on heparin drip with blood clots in bladder.   From uro - CT RSS results indicated Autosomal dominant polycystic kidney disease.  Resolution of the right hydronephrosis.  Resolution of the urinary bladder clot. He is to keep his appointment with DR. Shirley to discuss proceeding with IPP   BP and HR stable today. BMI 27 - 215 lbs     6/4/24 - visit cancelled after check in     Hospital Course:  54-year-old gentleman who was admitted preoperatively on May 19th for stabilization.  Nephrology and Cardiology were consulted, Medicine was also consulted to assist with the care.  Patient was initiated on dialysis hospital day 1.  Hospital day 2.  On May 21st patient was taken the operating room for insertion of inflatable penile prosthesis.  Patient initially did well, with no issues.  Unfortunately had some postoperative bleeding around his drain site, patient's most likely lost significant amount of blood and required 3 units transfused over the next 2 days.  Due to his reaction during dialysis postoperative day 1. Patient was taken to the ICU for acute monitoring.  Patient remains in the ICU until 524 when he was deemed stable to transfer back to the floor.  Patient's hemoglobin hematocrit will follow closely, no evidence residual bleeding after the drain was removed and stitched on proximally 05/23/2024.  Hemoglobin hematocrit  demonstrates stability though over the evening to his discharge date of 05/26/2024.  Patient has swelling had reduced upon discharge, patient's INR was stable on his home dose of Coumadin.  During hospitalization Nephrology maintain care cystic with dialysis.  Hospital medicine had to monitor his blood pressure and pulse modifying his home medications, please see the chart note.  Either way patient was tolerating p.o. diet and pain medications by postoperative day number 5 and deemed stable for discharge to home.  Patient will continue another 4 days of Levaquin, pain medication has been given, he has been instructed on how to take his Coumadin until he can returned to his Coumadin clinic next week.  Patient will go back to his usual dialysis clinic tomorrow.     Hosp follow up post     Results for orders placed during the hospital encounter of 04/27/23    Echo    Interpretation Summary  · The left ventricle is normal in size with concentric remodeling and low normal systolic function.  · Mild left atrial enlargement.  · Grade I left ventricular diastolic dysfunction.  · The estimated PA systolic pressure is 47 mmHg.  · Normal right ventricular size with normal right ventricular systolic function.  · There is pulmonary hypertension.  · Normal central venous pressure (3 mmHg).  · There is a bileaflet tilting disc mechanical aortic valve present. Prosthetic aortic valve is normal.  · The aortic valve mean gradient is 19 mmHg with a dimensionless index of 0.47.  · The estimated ejection fraction is 50%.  · The mean diastolic gradient across the mitral valve is 11 mmHg at a heart rate of 76 bpm.  · There is mild to moderate mitral stenosis.  · Mild to moderate tricuspid regurgitation.      Results for orders placed during the hospital encounter of 07/07/22    Echo    Interpretation Summary  · The left ventricle is normal in size with moderate concentric hypertrophy and normal systolic function.  · Mild left atrial  enlargement.  · The estimated ejection fraction is 60%.  · Grade II left ventricular diastolic dysfunction.  · There is abnormal septal wall motion consistent with post-operative status.  · Normal right ventricular size with normal right ventricular systolic function.  · Mild right atrial enlargement.  · There is a bileaflet tilting disc mechanical aortic valve present.  · The aortic valve mean gradient is 15 mmHg with a dimensionless index of 0.52.  · The mean diastolic gradient across the mitral valve is 11 mmHg at a heart rate of bpm.  · There is mild to moderate mitral stenosis.  · There is moderate pulmonary hypertension.  · Mild tricuspid regurgitation.  · Intermediate central venous pressure (8 mmHg).  · The estimated PA systolic pressure is 54 mmHg.  · The aortic root is mildly dilated.      RHC  Summary       The estimated blood loss was <50 mL.  Believe PA pressures are elevated in setting of increased filling pressures. Mean PA pressure is right around 34.  Recommend stricter volume management, review of echoes for the last 3 year demonstrates similar findings.  If this is a barrier to renal transplant, can consider adding PDE5, however do not think he will be able to tolerate given increased filling pressure and patient stating he drops pressures with HD.  Will likely need to discuss with abdominal transplant team.     The procedure log was documented by Documenter: Isis Sevilla and verified by Mariah Pierce MD.      Results for orders placed during the hospital encounter of 05/25/21    Nuclear Stress - Cardiology Interpreted    Interpretation Summary    Normal myocardial perfusion scan. There is no evidence of myocardial ischemia or infarction.    The gated perfusion images showed an ejection fraction of 65% at rest. The gated perfusion images showed an ejection fraction of 47% post stress.    The EKG portion of this study is negative for ischemia.    · Intermediate central venous pressure (8  mmHg).       Cath 2/17  1. Coronary angiography.      a.     Left main widely patent.      b.     LAD widely patent.      c.     Ramus widely patent and massive in size.      d.     Circumflex widely patent with a small OM 1 and OM 2 branch.      e.     Right coronary widely patent.  2. Hemodynamics:  Right heart catheterization.      a.     Pulmonary capillary wedge pressure 33 at end-expiration.      b.     Pulmonary artery pressure 78/42.      c.     Right ventricular pressure 70/21.      d.     Right atrial pressure 22 with a peak V-wave of 24.      e.     Cardiac output by thermodilution is between 6 and 7       L/minute, by Patricia 6.1 L/minute.    CONCLUSION  1. Normal coronary arteries.  2. Pulmonary hypertension.  3. Severe aortic insufficiency.      Past Medical History:   Diagnosis Date    Anemia     Aortic valve stenosis     s/p mechanical AVR    Atrial fibrillation     Atrial flutter     Cardiomyopathy     CHF (congestive heart failure)     Drug-induced erectile dysfunction     ESRD due to hypertension     HD M, W, F    ESRD on dialysis     GERD (gastroesophageal reflux disease)     Hepatitis B     Hyperlipidemia     Hypertension     Nightmares     Obesity     DANIEL (obstructive sleep apnea) 11/12/2019    Secondary hyperparathyroidism of renal origin     Supraventricular tachycardia     atrial tachycardia    Tachycardia     Valvular regurgitation     AI, TR       Past Surgical History:   Procedure Laterality Date    ASD REPAIR      age 8 [facility]    AV Graft Creation Left 03/2017    CARDIAC CATHETERIZATION      [facility]     CARDIAC VALVE SURGERY  02/08/2017    22 mm Medtronic AV, 28 mm TV Medtronic annuloplaty ring    COLONOSCOPY N/A 8/27/2019    Procedure: COLONOSCOPY;  Surgeon: Addie John MD;  Location: [facility];  Service: Endoscopy;  Laterality: N/A;  dialysis pt *needs K*    COLONOSCOPY N/A 9/3/2020    Procedure: COLONOSCOPY-need INR;  Surgeon: Jemma Youngblood MD;  Location: [facility];   Service: Endoscopy;  Laterality: N/A;    ESOPHAGOGASTRODUODENOSCOPY N/A 8/27/2019    Procedure: EGD (ESOPHAGOGASTRODUODENOSCOPY);  Surgeon: Addie John MD;  Location: [facility] ENDO;  Service: Endoscopy;  Laterality: N/A;    INSERTION OF INFLATABLE PENILE PROSTHESIS N/A 5/21/2024    Procedure: INSERTION, PENILE PROSTHESIS, INFLATABLE;  Surgeon: Bonifacio Shirley MD;  Location: [facility] OR;  Service: Urology;  Laterality: N/A;    RADIOFREQUENCY ABLATION  03/13/2017    Atrial tachycardia    REMOVAL OF GRAFT Left 7/2/2020    Procedure: REMOVAL, GRAFT;  Surgeon: Sascha Sidhu MD;  Location: [facility] OR;  Service: Peripheral Vascular;  Laterality: Left;    RIGHT HEART CATHETERIZATION Right 8/12/2021    Procedure: INSERTION, CATHETER, RIGHT HEART;  Surgeon: Mariah Pierce MD;  Location: [facility] CATH LAB;  Service: Cardiology;  Laterality: Right;       Social History     Tobacco Use    Smoking status: Never    Smokeless tobacco: Never   Substance Use Topics    Alcohol use: No     Comment: quit in 2016; does have heavy drinking history; started drinking at age 13; was drinking a 12 pack over the weekend and two 24 ounces of beer a day during the week along with a pint of hard alcohol    Drug use: No       Family History   Problem Relation Name Age of Onset    Leukemia Mother      Cancer Mother      Heart attack Father          massive MI at age 67    No Known Problems Sister      No Known Problems Brother      No Known Problems Sister      No Known Problems Sister      Heart failure Paternal Grandmother      Diabetes Paternal Aunt      Hypertension Paternal Aunt      Leukemia Paternal Aunt          CLL    Suicide Paternal Uncle      Anesthesia problems Paternal Uncle      Diabetes Paternal Aunt      Stroke Paternal Aunt      Valvular heart disease Maternal Aunt      Kidney disease Neg Hx      Colon cancer Neg Hx             Review of Systems   Constitutional:  Positive for malaise/fatigue.   HENT: Negative.     Eyes: Negative.     Respiratory:  Negative for shortness of breath.    Cardiovascular:  Negative for chest pain and palpitations.   Gastrointestinal: Negative.    Genitourinary:  Negative for hematuria.   Musculoskeletal:  Positive for joint pain.   Skin: Negative.    Neurological: Negative.    Endo/Heme/Allergies: Negative.    Psychiatric/Behavioral: Negative.     All 12 systems otherwise negative.      Wt Readings from Last 3 Encounters:   05/19/24 104.5 kg (230 lb 6.1 oz)   05/02/24 97.5 kg (215 lb)   04/09/24 97.7 kg (215 lb 6.2 oz)     Temp Readings from Last 3 Encounters:   05/26/24 98.6 °F (37 °C) (Oral)   03/12/24 96.4 °F (35.8 °C) (Tympanic)   02/22/24 97.9 °F (36.6 °C) (Oral)     BP Readings from Last 3 Encounters:   05/26/24 (!) 109/53   05/02/24 126/68   04/09/24 126/68     Pulse Readings from Last 3 Encounters:   05/26/24 97   05/02/24 78   04/09/24 86       There were no vitals taken for this visit.     Objective:   Physical Exam  Vitals and nursing note reviewed.   Constitutional:       General: He is not in acute distress.     Appearance: He is well-developed. He is not diaphoretic.   HENT:      Head: Normocephalic and atraumatic.      Nose: Nose normal.   Eyes:      General: No scleral icterus.     Conjunctiva/sclera: Conjunctivae normal.   Neck:      Thyroid: No thyromegaly.      Vascular: No JVD.   Cardiovascular:      Rate and Rhythm: Normal rate. Rhythm irregular.      Heart sounds: S1 normal and S2 normal. Murmur heard.      Midsystolic murmur is present at the upper right sternal border.      No friction rub. No gallop. No S3 or S4 sounds.      Comments: Crisp S2  Pulmonary:      Effort: Pulmonary effort is normal. No respiratory distress.      Breath sounds: Normal breath sounds. No stridor. No wheezing or rales.   Chest:      Chest wall: No tenderness.   Abdominal:      General: Bowel sounds are normal. There is no distension.      Palpations: Abdomen is soft. There is no mass.      Tenderness: There is no  abdominal tenderness. There is no rebound.   Genitourinary:     Comments: Deferred  Musculoskeletal:         General: No tenderness or deformity. Normal range of motion.      Cervical back: Normal range of motion and neck supple.   Lymphadenopathy:      Cervical: No cervical adenopathy.   Skin:     General: Skin is warm and dry.      Coloration: Skin is not pale.      Findings: No erythema or rash.   Neurological:      Mental Status: He is alert and oriented to person, place, and time.      Motor: No abnormal muscle tone.      Coordination: Coordination normal.   Psychiatric:         Behavior: Behavior normal.         Thought Content: Thought content normal.         Judgment: Judgment normal.         Lab Results   Component Value Date     05/26/2024    K 4.3 05/26/2024    CL 95 05/26/2024    CO2 26 05/26/2024    BUN 75 (H) 05/26/2024    CREATININE 14.3 (H) 05/26/2024     05/26/2024    HGBA1C 4.9 03/12/2024    MG 2.3 08/12/2021    AST 60 (H) 05/26/2024    ALT 46 (H) 05/26/2024    ALBUMIN 2.7 (L) 05/26/2024    PROT 6.1 05/26/2024    BILITOT 0.5 05/26/2024    WBC 10.88 05/26/2024    HGB 7.9 (L) 05/26/2024    HCT 24.2 (L) 05/26/2024    HCT 25 (L) 02/08/2017    MCV 93 05/26/2024     (L) 05/26/2024    INR 2.2 (H) 05/26/2024    CHOL 248 (H) 03/12/2024    HDL 29 (L) 03/12/2024    LDLCALC 170.0 (H) 03/12/2024    TRIG 245 (H) 03/12/2024     (H) 07/01/2021     Assessment:      No diagnosis found.              Plan:   1. Chronic Diastolic HF  - cont low salt diet  - cont HD for volume removal    2. S/p mech AVR, with mild to moderate MS, mild to mod TR  - cont coumadin  - f/u with coumadin clinic here  - f/u with CTS - in [place]  - ECHO in July 2022 with normal bi V function, stable valves AV and MV, mild to mod MS, PASP 54 mmHg  -ECHO 4/2023 with normal bi V function, grade 1 DD, mech AV with mean 19mmHg, mild to mod MS - 11 mmHg. Mild to mod TR, PASP 47 mmHg.   - repeat ECHO ordered     3.  ESRD - dry weight improved  - cont HD  - anemia with PRBCs and EPO    4. HTN   - cont meds  - adjusted BP meds - labetelol TID    5. PVCs, SVT s/p RFA , Afib - in NSR now   - cont BB and CCB   - f/u with Dr. De León  - recurrence of his old arrhythmia or could be a new source (AFL, R atriotomy, perimitral).  - discussed ablation.     6. HLD  - cont statin    7. ED  - cont tx PRN  - f/u urology for penile implant surgery   - was on Cialis, start Viagra f/u with urology   - monitor BP, avoid nitrates     8. Pulm HTN/ DANIEL  - PASP 56 mmHg. - repeat ECHO ordered   -f/u CHF/pulm HTN clinic - may start PDE5  - f/u with Dr. Pierce    9. Chronic hep B   - cont tx per hepatology  - further workup pending for cirrhosis - EGD      Visit today included increased complexity associated with the care of the episodic problem SVT addressed and managing the longitudinal care of the patient due to the serious and/or complex managed problem(s) .      Thank you for allowing me to participate in this patient's care. Please do not hesitate to contact me with any questions or concerns. Consult note has been forwarded to the referral physician.     Fuad Gudino MD, [facility]  Cardiovascular Disease  [facility], [place]  [phone] (P)

## 2024-06-04 NOTE — PROGRESS NOTES
Patient's INR is supra-therapeutic at 4.8. [facility] Patient reports he followed previous dosing/instructions. Patient reports his appetite has  significantly. Advised of increased risk of bleeding; signs/symptoms of bleeding and need to go to ED if he experiences any. Patient reports he is experiencing shortness of breath and dizziness. Sent to PharmD for dosing/instructions.

## 2024-06-04 NOTE — TELEPHONE ENCOUNTER
Patient has been advised to hold warfarin dose today 6/4/24; then take warfarin 5 mg every day. Patient has been advised to put warfarin 7.5 mg tablets away. Patient verbalizes understanding.

## 2024-06-05 ENCOUNTER — TELEPHONE (OUTPATIENT)
Dept: UROLOGY | Facility: CLINIC | Age: 54
End: 2024-06-05
Payer: MEDICARE

## 2024-06-05 RX ORDER — OXYCODONE AND ACETAMINOPHEN 5; 325 MG/1; MG/1
1 TABLET ORAL EVERY 4 HOURS PRN
Qty: 30 TABLET | Refills: 0 | Status: SHIPPED | OUTPATIENT
Start: 2024-06-05

## 2024-06-05 NOTE — TELEPHONE ENCOUNTER
Grant Shirley, this patient is requesting that his Percocet Rx to be sent to [facility] instead of [facility].   Please advise.     ----- Message from Analilia Heredia sent at 6/5/2024 11:21 AM CDT -----  Contact: Patient, [phone]  Calling to request Rx oxyCODONE-acetaminophen (PERCOCET) 5-325 mg per tablet transferred to      [facility]

## 2024-06-06 ENCOUNTER — DOCUMENTATION ONLY (OUTPATIENT)
Dept: INTERNAL MEDICINE | Facility: CLINIC | Age: 54
End: 2024-06-06
Payer: MEDICARE

## 2024-06-11 ENCOUNTER — ANTI-COAG VISIT (OUTPATIENT)
Dept: CARDIOLOGY | Facility: CLINIC | Age: 54
End: 2024-06-11
Payer: MEDICARE

## 2024-06-11 DIAGNOSIS — Z79.01 ON COUMADIN FOR ATRIAL FIBRILLATION: ICD-10-CM

## 2024-06-11 DIAGNOSIS — Z79.01 LONG TERM (CURRENT) USE OF ANTICOAGULANTS: Primary | ICD-10-CM

## 2024-06-11 DIAGNOSIS — I48.91 ON COUMADIN FOR ATRIAL FIBRILLATION: ICD-10-CM

## 2024-06-11 DIAGNOSIS — Z95.2 HISTORY OF MECHANICAL AORTIC VALVE REPLACEMENT: ICD-10-CM

## 2024-06-11 LAB
CTP QC/QA: YES
INR PPP: 1.9 (ref 2–3)

## 2024-06-11 PROCEDURE — 85610 PROTHROMBIN TIME: CPT | Mod: PBBFAC

## 2024-06-11 PROCEDURE — 99999PBSHW POCT INR: Mod: PBBFAC,,,

## 2024-06-11 NOTE — PROGRESS NOTES
Patient's INR is sub-therapeutic at 1.9.  Patient reports he followed previous instructions. Patient reports he ate more dark leafy greens than usual.  Advised of increased risk of clotting; sign/symptoms of clotting and need to go to ED if he experiences any.  Patient reports he is not having any signs/symptoms of clotting. Instructions given: Will give boosted dose of warfarin 7.5 mg today 6/11/24; then resume warfarin 5 mg every day. Recheck on 6/25/24. Calendar reviewed with patient. Patient verbalizes understanding.

## 2024-06-25 ENCOUNTER — ANTI-COAG VISIT (OUTPATIENT)
Dept: CARDIOLOGY | Facility: CLINIC | Age: 54
End: 2024-06-25
Payer: MEDICARE

## 2024-06-25 ENCOUNTER — TELEPHONE (OUTPATIENT)
Dept: HEPATOLOGY | Facility: CLINIC | Age: 54
End: 2024-06-25
Payer: MEDICARE

## 2024-06-25 ENCOUNTER — OFFICE VISIT (OUTPATIENT)
Dept: CARDIOLOGY | Facility: CLINIC | Age: 54
End: 2024-06-25
Payer: MEDICARE

## 2024-06-25 ENCOUNTER — TELEPHONE (OUTPATIENT)
Dept: INTERNAL MEDICINE | Facility: CLINIC | Age: 54
End: 2024-06-25
Payer: MEDICARE

## 2024-06-25 VITALS
HEART RATE: 75 BPM | BODY MASS INDEX: 25.62 KG/M2 | OXYGEN SATURATION: 98 % | DIASTOLIC BLOOD PRESSURE: 64 MMHG | WEIGHT: 199.5 LBS | SYSTOLIC BLOOD PRESSURE: 122 MMHG

## 2024-06-25 DIAGNOSIS — I47.29 NSVT (NONSUSTAINED VENTRICULAR TACHYCARDIA): ICD-10-CM

## 2024-06-25 DIAGNOSIS — N18.6 END-STAGE RENAL DISEASE: ICD-10-CM

## 2024-06-25 DIAGNOSIS — I48.0 PAF (PAROXYSMAL ATRIAL FIBRILLATION): ICD-10-CM

## 2024-06-25 DIAGNOSIS — I27.20 PULMONARY HYPERTENSION: ICD-10-CM

## 2024-06-25 DIAGNOSIS — Z99.2 ESRD (END STAGE RENAL DISEASE) ON DIALYSIS: ICD-10-CM

## 2024-06-25 DIAGNOSIS — Z98.890 HISTORY OF RADIOFREQUENCY ABLATION FOR COMPLEX RIGHT ATRIAL ARRHYTHMIA: ICD-10-CM

## 2024-06-25 DIAGNOSIS — N18.6 ESRD (END STAGE RENAL DISEASE) ON DIALYSIS: ICD-10-CM

## 2024-06-25 DIAGNOSIS — Z79.01 ON COUMADIN FOR ATRIAL FIBRILLATION: ICD-10-CM

## 2024-06-25 DIAGNOSIS — Z95.2 H/O MECHANICAL AORTIC VALVE REPLACEMENT: ICD-10-CM

## 2024-06-25 DIAGNOSIS — I11.0 HYPERTENSIVE CARDIOMEGALY WITH HEART FAILURE: ICD-10-CM

## 2024-06-25 DIAGNOSIS — I48.91 ON COUMADIN FOR ATRIAL FIBRILLATION: ICD-10-CM

## 2024-06-25 DIAGNOSIS — D64.9 ANEMIA, UNSPECIFIED TYPE: ICD-10-CM

## 2024-06-25 DIAGNOSIS — I47.19 ATRIAL TACHYCARDIA: ICD-10-CM

## 2024-06-25 DIAGNOSIS — I50.32 CHRONIC DIASTOLIC HEART FAILURE: Primary | ICD-10-CM

## 2024-06-25 DIAGNOSIS — Z79.01 ANTICOAGULATED: ICD-10-CM

## 2024-06-25 DIAGNOSIS — Z79.01 LONG TERM (CURRENT) USE OF ANTICOAGULANTS: Primary | ICD-10-CM

## 2024-06-25 DIAGNOSIS — Z95.2 S/P AVR (AORTIC VALVE REPLACEMENT): ICD-10-CM

## 2024-06-25 DIAGNOSIS — Z95.2 HISTORY OF MECHANICAL AORTIC VALVE REPLACEMENT: ICD-10-CM

## 2024-06-25 DIAGNOSIS — J43.9 MIXED RESTRICTIVE AND OBSTRUCTIVE LUNG DISEASE: ICD-10-CM

## 2024-06-25 DIAGNOSIS — I70.0 AORTIC ATHEROSCLEROSIS: ICD-10-CM

## 2024-06-25 DIAGNOSIS — I49.3 PVC'S (PREMATURE VENTRICULAR CONTRACTIONS): ICD-10-CM

## 2024-06-25 DIAGNOSIS — I47.10 SVT (SUPRAVENTRICULAR TACHYCARDIA): ICD-10-CM

## 2024-06-25 DIAGNOSIS — J98.4 MIXED RESTRICTIVE AND OBSTRUCTIVE LUNG DISEASE: ICD-10-CM

## 2024-06-25 DIAGNOSIS — Z79.01 LONG TERM (CURRENT) USE OF ANTICOAGULANTS: ICD-10-CM

## 2024-06-25 DIAGNOSIS — I27.20 PULMONARY HTN: ICD-10-CM

## 2024-06-25 DIAGNOSIS — I10 ESSENTIAL HYPERTENSION: ICD-10-CM

## 2024-06-25 LAB
CTP QC/QA: YES
INR PPP: 1.6 (ref 2–3)

## 2024-06-25 PROCEDURE — 85610 PROTHROMBIN TIME: CPT | Mod: PBBFAC

## 2024-06-25 PROCEDURE — 99999 PR PBB SHADOW E&M-EST. PATIENT-LVL V: CPT | Mod: PBBFAC,,, | Performed by: INTERNAL MEDICINE

## 2024-06-25 PROCEDURE — 99999PBSHW POCT INR: Mod: PBBFAC,,,

## 2024-06-25 PROCEDURE — 99215 OFFICE O/P EST HI 40 MIN: CPT | Mod: PBBFAC | Performed by: INTERNAL MEDICINE

## 2024-06-25 RX ORDER — ALBUTEROL SULFATE 90 UG/1
2 AEROSOL, METERED RESPIRATORY (INHALATION) EVERY 6 HOURS PRN
Qty: 18 G | Refills: 0 | Status: SHIPPED | OUTPATIENT
Start: 2024-06-25 | End: 2025-06-25

## 2024-06-25 NOTE — PROGRESS NOTES
Subjective:   Patient ID:  Isidro White Jr. is a 54 y.o. male who presents for cardiac consult of Hospital Follow Up, Shortness of Breath, Palpitations, and Fatigue          The patient came in today for cardiac consult of Hospital Follow Up, Shortness of Breath, Palpitations, and Fatigue      Isidro White Jr. is a 54 y.o. male pt with PAF, ASD closure at age 8 ([facility]), HFpEF, s/p AVR with a 22 mm mechanical valve and TV annuloplasty with a 28 mm ring 3/17, HTN, PHTN, Mitral stenosis, SVT, EP performed RFA (3/13/17) and has been on HD - MWF since Feb 8,2016 here for CV follow up.     4/9/24  Penile prosthetic implant cancelled in Feb due to bleeding on heparin drip with blood clots in bladder.   From uro - CT RSS results indicated Autosomal dominant polycystic kidney disease.  Resolution of the right hydronephrosis.  Resolution of the urinary bladder clot. He is to keep his appointment with DR. Shirley to discuss proceeding with IPP   BP and HR stable today. BMI 27 - 215 lbs     6/25/24     Hospital Course:  54-year-old gentleman who was admitted preoperatively on May 19th for stabilization.  Nephrology and Cardiology were consulted, Medicine was also consulted to assist with the care.  Patient was initiated on dialysis hospital day 1.  Hospital day 2.  On May 21st patient was taken the operating room for insertion of inflatable penile prosthesis.  Patient initially did well, with no issues.  Unfortunately had some postoperative bleeding around his drain site, patient's most likely lost significant amount of blood and required 3 units transfused over the next 2 days.  Due to his reaction during dialysis postoperative day 1. Patient was taken to the ICU for acute monitoring.  Patient remains in the ICU until 524 when he was deemed stable to transfer back to the floor.  Patient's hemoglobin hematocrit will follow closely, no evidence residual bleeding after the drain was removed and stitched on  proximally 05/23/2024.  Hemoglobin hematocrit demonstrates stability though over the evening to his discharge date of 05/26/2024.  Patient has swelling had reduced upon discharge, patient's INR was stable on his home dose of Coumadin.  During hospitalization Nephrology maintain care cystic with dialysis.  Hospital medicine had to monitor his blood pressure and pulse modifying his home medications, please see the chart note.  Either way patient was tolerating p.o. diet and pain medications by postoperative day number 5 and deemed stable for discharge to home.  Patient will continue another 4 days of Levaquin, pain medication has been given, he has been instructed on how to take his Coumadin until he can returned to his Coumadin clinic next week.  Patient will go back to his usual dialysis clinic tomorrow.     Hosp follow up - was recently at [facility] for SOB and was more anemic    Component Ref Range & Units 1 mo ago  (5/26/24) 1 mo ago  (5/25/24) 1 mo ago  (5/24/24) 1 mo ago  (5/22/24) 1 mo ago  (5/22/24) 1 mo ago  (5/22/24) 1 mo ago  (5/22/24)   WBC 3.90 - 12.70 K/uL 10.88 10.98 10.75   11.77 11.17   RBC 4.60 - 6.20 M/uL 2.61 Low  2.58 Low  2.10 Low    2.23 Low  2.62 Low    Hemoglobin 14.0 - 18.0 g/dL 7.9 Low  7.9 Low  6.8 Low   8.7 Low  7.3 Low  8.5 Low    Hematocrit 40.0 - 54.0 % 24.2 Low  23.9 Low  19.8 Low Panic  CM 26.2 Low   22.2 Low  26.6 Low      He was given 2 units blood at hospital post op and then went to [facility] and was given another unit of blood.     ECHO 5/2024 with normal bi V function, grade 2 DD, AV valve stable - mechanical. Mild to mod MS - MG 11 mmHg, mild MR, mild to mod TR. Pulm HTN - PASP 70 mmHg.       Results for orders placed during the hospital encounter of 05/02/24    Echo    Interpretation Summary    Left Ventricle: The left ventricle is normal in size. Normal wall thickness. There is concentric remodeling. Normal wall motion. Ejection fraction by visual approximation is 60%. Grade II  diastolic dysfunction.    Right Ventricle: Normal right ventricular cavity size. Wall thickness is normal. Systolic function is normal.    Left Atrium: Left atrium is severely dilated.    Aortic Valve: There is a tilting disc prosthetic valve in the aortic position.    Mitral Valve: Moderately calcified leaflets. Moderately restricted motion. There is mild to moderate stenosis. MVA 2.6 cm2 by planimetry, 1.6 cm2 by PHT.  The mean pressure gradient across the mitral valve is 11 mmHg at a heart rate of  bpm. There is mild regurgitation.    Tricuspid Valve: There is mild to moderate regurgitation.    Pulmonary Artery: There is pulmonary hypertension. The estimated pulmonary artery systolic pressure is 70 mmHg.    IVC/SVC: Normal venous pressure at 3 mmHg.      Results for orders placed during the hospital encounter of 04/27/23    Echo    Interpretation Summary  · The left ventricle is normal in size with concentric remodeling and low normal systolic function.  · Mild left atrial enlargement.  · Grade I left ventricular diastolic dysfunction.  · The estimated PA systolic pressure is 47 mmHg.  · Normal right ventricular size with normal right ventricular systolic function.  · There is pulmonary hypertension.  · Normal central venous pressure (3 mmHg).  · There is a bileaflet tilting disc mechanical aortic valve present. Prosthetic aortic valve is normal.  · The aortic valve mean gradient is 19 mmHg with a dimensionless index of 0.47.  · The estimated ejection fraction is 50%.  · The mean diastolic gradient across the mitral valve is 11 mmHg at a heart rate of 76 bpm.  · There is mild to moderate mitral stenosis.  · Mild to moderate tricuspid regurgitation.      Results for orders placed during the hospital encounter of 07/07/22    Echo    Interpretation Summary  · The left ventricle is normal in size with moderate concentric hypertrophy and normal systolic function.  · Mild left atrial enlargement.  · The estimated  ejection fraction is 60%.  · Grade II left ventricular diastolic dysfunction.  · There is abnormal septal wall motion consistent with post-operative status.  · Normal right ventricular size with normal right ventricular systolic function.  · Mild right atrial enlargement.  · There is a bileaflet tilting disc mechanical aortic valve present.  · The aortic valve mean gradient is 15 mmHg with a dimensionless index of 0.52.  · The mean diastolic gradient across the mitral valve is 11 mmHg at a heart rate of bpm.  · There is mild to moderate mitral stenosis.  · There is moderate pulmonary hypertension.  · Mild tricuspid regurgitation.  · Intermediate central venous pressure (8 mmHg).  · The estimated PA systolic pressure is 54 mmHg.  · The aortic root is mildly dilated.      RHC  Summary       The estimated blood loss was <50 mL.  Believe PA pressures are elevated in setting of increased filling pressures. Mean PA pressure is right around 34.  Recommend stricter volume management, review of echoes for the last 3 year demonstrates similar findings.  If this is a barrier to renal transplant, can consider adding PDE5, however do not think he will be able to tolerate given increased filling pressure and patient stating he drops pressures with HD.  Will likely need to discuss with abdominal transplant team.     The procedure log was documented by Documenter: Isis Sevilla and verified by Mariah Pierce MD.      Results for orders placed during the hospital encounter of 05/25/21    Nuclear Stress - Cardiology Interpreted    Interpretation Summary    Normal myocardial perfusion scan. There is no evidence of myocardial ischemia or infarction.    The gated perfusion images showed an ejection fraction of 65% at rest. The gated perfusion images showed an ejection fraction of 47% post stress.    The EKG portion of this study is negative for ischemia.    · Intermediate central venous pressure (8 mmHg).       Cath 2/17  1.  Coronary angiography.      a.     Left main widely patent.      b.     LAD widely patent.      c.     Ramus widely patent and massive in size.      d.     Circumflex widely patent with a small OM 1 and OM 2 branch.      e.     Right coronary widely patent.  2. Hemodynamics:  Right heart catheterization.      a.     Pulmonary capillary wedge pressure 33 at end-expiration.      b.     Pulmonary artery pressure 78/42.      c.     Right ventricular pressure 70/21.      d.     Right atrial pressure 22 with a peak V-wave of 24.      e.     Cardiac output by thermodilution is between 6 and 7       L/minute, by Patricia 6.1 L/minute.    CONCLUSION  1. Normal coronary arteries.  2. Pulmonary hypertension.  3. Severe aortic insufficiency.      Past Medical History:   Diagnosis Date    Anemia     Aortic valve stenosis     s/p mechanical AVR    Atrial fibrillation     Atrial flutter     Cardiomyopathy     CHF (congestive heart failure)     Drug-induced erectile dysfunction     ESRD due to hypertension     HD M, W, F    ESRD on dialysis     GERD (gastroesophageal reflux disease)     Hepatitis B     Hyperlipidemia     Hypertension     Nightmares     Obesity     DANIEL (obstructive sleep apnea) 11/12/2019    Secondary hyperparathyroidism of renal origin     Supraventricular tachycardia     atrial tachycardia    Tachycardia     Valvular regurgitation     AI, TR       Past Surgical History:   Procedure Laterality Date    ASD REPAIR      age 8 [facility]    AV Graft Creation Left 03/2017    CARDIAC CATHETERIZATION      [facility]     CARDIAC VALVE SURGERY  02/08/2017    22 mm Medtronic AV, 28 mm TV Medtronic annuloplaty ring    COLONOSCOPY N/A 8/27/2019    Procedure: COLONOSCOPY;  Surgeon: Addie John MD;  Location: [facility] ENDO;  Service: Endoscopy;  Laterality: N/A;  dialysis pt *needs K*    COLONOSCOPY N/A 9/3/2020    Procedure: COLONOSCOPY-need INR;  Surgeon: Jemma Youngblood MD;  Location: [facility] ENDO;  Service: Endoscopy;   Laterality: N/A;    ESOPHAGOGASTRODUODENOSCOPY N/A 8/27/2019    Procedure: EGD (ESOPHAGOGASTRODUODENOSCOPY);  Surgeon: Addie John MD;  Location: [facility] ENDO;  Service: Endoscopy;  Laterality: N/A;    INSERTION OF INFLATABLE PENILE PROSTHESIS N/A 5/21/2024    Procedure: INSERTION, PENILE PROSTHESIS, INFLATABLE;  Surgeon: Bonifacio Shirley MD;  Location: [facility] OR;  Service: Urology;  Laterality: N/A;    RADIOFREQUENCY ABLATION  03/13/2017    Atrial tachycardia    REMOVAL OF GRAFT Left 7/2/2020    Procedure: REMOVAL, GRAFT;  Surgeon: Sascha Sidhu MD;  Location: [facility] OR;  Service: Peripheral Vascular;  Laterality: Left;    RIGHT HEART CATHETERIZATION Right 8/12/2021    Procedure: INSERTION, CATHETER, RIGHT HEART;  Surgeon: Mariah Pierce MD;  Location: [facility] CATH LAB;  Service: Cardiology;  Laterality: Right;       Social History     Tobacco Use    Smoking status: Never    Smokeless tobacco: Never   Substance Use Topics    Alcohol use: No     Comment: quit in 2016; does have heavy drinking history; started drinking at age 13; was drinking a 12 pack over the weekend and two 24 ounces of beer a day during the week along with a pint of hard alcohol    Drug use: No       Family History   Problem Relation Name Age of Onset    Leukemia Mother      Cancer Mother      Heart attack Father          massive MI at age 67    No Known Problems Sister      No Known Problems Brother      No Known Problems Sister      No Known Problems Sister      Heart failure Paternal Grandmother      Diabetes Paternal Aunt      Hypertension Paternal Aunt      Leukemia Paternal Aunt          CLL    Suicide Paternal Uncle      Anesthesia problems Paternal Uncle      Diabetes Paternal Aunt      Stroke Paternal Aunt      Valvular heart disease Maternal Aunt      Kidney disease Neg Hx      Colon cancer Neg Hx             Review of Systems   Constitutional:  Positive for malaise/fatigue.   HENT: Negative.     Eyes: Negative.    Respiratory:   Negative for shortness of breath.    Cardiovascular:  Negative for chest pain and palpitations.   Gastrointestinal: Negative.    Genitourinary:  Negative for hematuria.   Musculoskeletal:  Positive for joint pain.   Skin: Negative.    Neurological: Negative.    Endo/Heme/Allergies: Negative.    Psychiatric/Behavioral: Negative.     All 12 systems otherwise negative.      Wt Readings from Last 3 Encounters:   06/25/24 90.5 kg (199 lb 8.3 oz)   05/19/24 104.5 kg (230 lb 6.1 oz)   05/02/24 97.5 kg (215 lb)     Temp Readings from Last 3 Encounters:   05/26/24 98.6 °F (37 °C) (Oral)   03/12/24 96.4 °F (35.8 °C) (Tympanic)   02/22/24 97.9 °F (36.6 °C) (Oral)     BP Readings from Last 3 Encounters:   06/25/24 122/64   05/26/24 (!) 109/53   05/02/24 126/68     Pulse Readings from Last 3 Encounters:   06/25/24 75   05/26/24 97   05/02/24 78       /64 (BP Location: Right arm, Patient Position: Sitting, BP Method: Medium (Manual))   Pulse 75   Wt 90.5 kg (199 lb 8.3 oz)   SpO2 98%   BMI 25.62 kg/m²      Objective:   Physical Exam  Vitals and nursing note reviewed.   Constitutional:       General: He is not in acute distress.     Appearance: He is well-developed. He is not diaphoretic.   HENT:      Head: Normocephalic and atraumatic.      Nose: Nose normal.   Eyes:      General: No scleral icterus.     Conjunctiva/sclera: Conjunctivae normal.   Neck:      Thyroid: No thyromegaly.      Vascular: No JVD.   Cardiovascular:      Rate and Rhythm: Normal rate. Rhythm irregular.      Heart sounds: S1 normal and S2 normal. Murmur heard.      Midsystolic murmur is present at the upper right sternal border.      No friction rub. No gallop. No S3 or S4 sounds.      Comments: Crisp S2  Pulmonary:      Effort: Pulmonary effort is normal. No respiratory distress.      Breath sounds: Normal breath sounds. No stridor. No wheezing or rales.   Chest:      Chest wall: No tenderness.   Abdominal:      General: Bowel sounds are normal.  There is no distension.      Palpations: Abdomen is soft. There is no mass.      Tenderness: There is no abdominal tenderness. There is no rebound.   Genitourinary:     Comments: Deferred  Musculoskeletal:         General: No tenderness or deformity. Normal range of motion.      Cervical back: Normal range of motion and neck supple.   Lymphadenopathy:      Cervical: No cervical adenopathy.   Skin:     General: Skin is warm and dry.      Coloration: Skin is not pale.      Findings: No erythema or rash.   Neurological:      Mental Status: He is alert and oriented to person, place, and time.      Motor: No abnormal muscle tone.      Coordination: Coordination normal.   Psychiatric:         Behavior: Behavior normal.         Thought Content: Thought content normal.         Judgment: Judgment normal.         Lab Results   Component Value Date     05/26/2024    K 4.3 05/26/2024    CL 95 05/26/2024    CO2 26 05/26/2024    BUN 75 (H) 05/26/2024    CREATININE 14.3 (H) 05/26/2024     05/26/2024    HGBA1C 4.9 03/12/2024    MG 2.3 08/12/2021    AST 60 (H) 05/26/2024    ALT 46 (H) 05/26/2024    ALBUMIN 2.7 (L) 05/26/2024    PROT 6.1 05/26/2024    BILITOT 0.5 05/26/2024    WBC 10.88 05/26/2024    HGB 7.9 (L) 05/26/2024    HCT 24.2 (L) 05/26/2024    HCT 25 (L) 02/08/2017    MCV 93 05/26/2024     (L) 05/26/2024    INR 1.6 (A) 06/25/2024    INR 2.2 (H) 05/26/2024    CHOL 248 (H) 03/12/2024    HDL 29 (L) 03/12/2024    LDLCALC 170.0 (H) 03/12/2024    TRIG 245 (H) 03/12/2024     (H) 07/01/2021     Assessment:      1. Chronic diastolic heart failure    2. On Coumadin for atrial fibrillation    3. History of mechanical aortic valve replacement    4. Long term (current) use of anticoagulants    5. NSVT (nonsustained ventricular tachycardia)    6. Anticoagulated    7. SVT (supraventricular tachycardia)    8. Pulmonary hypertension    9. S/P AVR (aortic valve replacement)    10. PVC's (premature ventricular  contractions)    11. H/O mechanical aortic valve replacement    12. History of radiofrequency ablation for complex right atrial arrhythmia    13. End-stage renal disease    14. Aortic atherosclerosis    15. Atrial tachycardia    16. ESRD (end stage renal disease) on dialysis    17. PAF (paroxysmal atrial fibrillation)    18. Pulmonary HTN    19. Essential hypertension    20. Hypertensive cardiomegaly with heart failure            Plan:   1. Chronic Diastolic HF  - cont low salt diet  - cont HD for volume removal    2. S/p mech AVR, with mild to moderate MS, mild to mod TR, pulm HTN   - cont coumadin  - f/u with coumadin clinic here  - f/u with CTS - in [place] Dr. Gastelum  - ECHO in July 2022 with normal bi V function, stable valves AV and MV, mild to mod MS, PASP 54 mmHg  -ECHO 4/2023 with normal bi V function, grade 1 DD, mech AV with mean 19mmHg, mild to mod MS - 11 mmHg. Mild to mod TR, PASP 47 mmHg.   -ECHO 5/2024 with normal bi V function, grade 2 DD, AV valve stable - mechanical. Mild to mod MS - MG 11 mmHg, mild MR, mild to mod TR. Pulm HTN - PASP 70 mmHg.     3. ESRD - dry weight improved  - cont HD  - anemia with PRBCs and EPO - refer to hemeonc    4. HTN   - cont meds  - adjusted BP meds - labetelol TID    5. PVCs, SVT s/p RFA , Afib - in NSR now   - cont BB and CCB   - f/u with Dr. De León  - recurrence of his old arrhythmia or could be a new source (AFL, R atriotomy, perimitral).  - discussed ablation.     6. HLD  - cont statin    7. ED  - cont tx PRN  - f/u urology for penile implant surgery   - was on Cialis, start Viagra f/u with urology   - monitor BP, avoid nitrates     8. Pulm HTN/ DANIEL  - PASP 56 mmHg. - repeat ECHO ordered   -f/u CHF/pulm HTN clinic - may start PDE5  - f/u with Dr. Pierce    9. Chronic hep B   - cont tx per hepatology  - further workup pending for cirrhosis - EGD      Visit today included increased complexity associated with the care of the episodic problem SVT addressed and managing  the longitudinal care of the patient due to the serious and/or complex managed problem(s) .      Thank you for allowing me to participate in this patient's care. Please do not hesitate to contact me with any questions or concerns. Consult note has been forwarded to the referral physician.     Fuad Gudino MD, [facility]  Cardiovascular Disease  [facility], [place]  [phone] (P)

## 2024-06-25 NOTE — TELEPHONE ENCOUNTER
----- Message from Carlos Limon sent at 6/25/2024  9:17 AM CDT -----  Contact: [phone]  Type:  Same Day Appointment Request    Caller is requesting a same day appointment.  Caller declined first available appointment listed below.    Name of Caller:Isidro   When is the first available appointment?07/27/2024  Symptoms:meds   Best Call Back Number:[phone]   Additional Information: pt declined other locations

## 2024-06-25 NOTE — PROGRESS NOTES
Patient's INR is sub-therapeutic at 1.6.  Patient reports he followed previous instructions. Patient reports he ate 12.5 ounces of spinach on 6/24/24.  Advised of increased risk of clotting; sign/symptoms of clotting and need to go to ED if he experiences any.  Patient reports he is not having any signs/symptoms of clotting. Instructions given: Will give boosted dose of warfarin 10 mg today 6/25/24; then resume warfarin 5 mg every day. Recheck on 7/2/24. Calendar reviewed with patient. Patient verbalizes understanding.

## 2024-06-25 NOTE — TELEPHONE ENCOUNTER
----- Message from Robyn Rivas sent at 6/25/2024  3:10 PM CDT -----  Regarding: appt concerns  Name of who is calling:   Isidro      What is the request in detail: Pt is requesting a call back in ref to cancelling 07/02/2024 and rescheduling with Dr Jemma Kelley at the [facility]  / please pt       Can the clinic reply by [facility]:no      What number to call back if not [facility]:[phone]

## 2024-06-25 NOTE — TELEPHONE ENCOUNTER
Returned patient's call, he wished to change his appointment from [facility] to [facility]. I informed him that Dr. Kelley has no availability and advised that he keep that appointment to which he agreed.

## 2024-06-26 DIAGNOSIS — R31.0 GROSS HEMATURIA: Primary | ICD-10-CM

## 2024-06-27 ENCOUNTER — TELEPHONE (OUTPATIENT)
Dept: HEMATOLOGY/ONCOLOGY | Facility: CLINIC | Age: 54
End: 2024-06-27
Payer: MEDICARE

## 2024-06-27 ENCOUNTER — OFFICE VISIT (OUTPATIENT)
Dept: UROLOGY | Facility: CLINIC | Age: 54
End: 2024-06-27
Payer: MEDICARE

## 2024-06-27 ENCOUNTER — HOSPITAL ENCOUNTER (OUTPATIENT)
Dept: RADIOLOGY | Facility: HOSPITAL | Age: 54
Discharge: HOME | End: 2024-06-27
Attending: UROLOGY
Payer: MEDICARE

## 2024-06-27 VITALS
WEIGHT: 199.5 LBS | HEART RATE: 86 BPM | DIASTOLIC BLOOD PRESSURE: 63 MMHG | SYSTOLIC BLOOD PRESSURE: 122 MMHG | BODY MASS INDEX: 25.62 KG/M2

## 2024-06-27 DIAGNOSIS — R31.0 GROSS HEMATURIA: ICD-10-CM

## 2024-06-27 DIAGNOSIS — N52.9 ERECTILE DYSFUNCTION, UNSPECIFIED ERECTILE DYSFUNCTION TYPE: ICD-10-CM

## 2024-06-27 DIAGNOSIS — R31.0 GROSS HEMATURIA: Primary | ICD-10-CM

## 2024-06-27 PROCEDURE — 99213 OFFICE O/P EST LOW 20 MIN: CPT | Mod: PBBFAC,25 | Performed by: UROLOGY

## 2024-06-27 PROCEDURE — 99999 PR PBB SHADOW E&M-EST. PATIENT-LVL III: CPT | Mod: PBBFAC,,, | Performed by: UROLOGY

## 2024-06-27 PROCEDURE — 99024 POSTOP FOLLOW-UP VISIT: CPT | Mod: POP,,, | Performed by: UROLOGY

## 2024-06-27 PROCEDURE — 74176 CT ABD & PELVIS W/O CONTRAST: CPT | Mod: TC

## 2024-06-27 PROCEDURE — 74176 CT ABD & PELVIS W/O CONTRAST: CPT | Mod: 26,,, | Performed by: RADIOLOGY

## 2024-06-27 RX ORDER — OXYCODONE AND ACETAMINOPHEN 5; 325 MG/1; MG/1
1 TABLET ORAL EVERY 4 HOURS PRN
Qty: 25 TABLET | Refills: 0 | Status: SHIPPED | OUTPATIENT
Start: 2024-06-27

## 2024-06-27 NOTE — TELEPHONE ENCOUNTER
"Spoke to patient in reference to Hematology referral from Dr. uGdino. States "I have to many appts right now I'll have to get back to you." Pt d/c call prior to getting my contact info [facility] message sent.   "

## 2024-06-27 NOTE — PROGRESS NOTES
Chief Complaint:   Encounter Diagnoses   Name Primary?    Gross hematuria Yes    Erectile dysfunction, unspecified erectile dysfunction type          HPI:   6/27/24- here today for his 1st postoperative assessment.  Still having some discomfort in the perineum.       2/28/19: 50 yo man voids about once a day since he is a MWF dialysis pt.  Saw a clot and gross hematuria about 2-3 weeks ago on one occasion.  No abd/pelvic pain and no exac/rel factors.  No urolithiasis.  Weak stream.  No  history.  Normal sexual function until recently.  Had a CT Urogram that shows no abnormalities except renal cystic function consistent with ESRD.      Allergies:  Patient has no known allergies.    Medications:  has a current medication list which includes the following prescription(s): albuterol, ascorbic acid (vitamin c), shawna-nataly, calcium acetate(phosphat bind), calcium acetate(phosphat bind), diltiazem, ferrous sulfate, hydroxyzine hcl, labetalol, labetalol, multivitamin with minerals, omega-3 fatty acids-vitamin e, oxycodone-acetaminophen, pantoprazole, rosuvastatin, tenofovir disoproxil fumarate, tenofovir disoproxil fumarate, vitamin d, warfarin, warfarin, and warfarin, and the following Facility-Administered Medications: 0.9% nacl and sodium chloride 0.9%.    Review of Systems:  General: No fever, chills, fatigability, or weight loss.  Skin: No rashes, itching, or changes in color or texture of skin.  Chest: Denies HAY, cyanosis, wheezing, cough, and sputum production.  Abdomen: Appetite fine. No weight loss. Denies diarrhea, abdominal pain, hematemesis, or blood in stool.  Musculoskeletal: No joint stiffness or swelling. Denies back pain.  : As above.  All other review of systems negative.    PMH:   has a past medical history of Anemia, Aortic valve stenosis, Atrial fibrillation, Atrial flutter, Cardiomyopathy, CHF (congestive heart failure), Drug-induced erectile dysfunction, ESRD due to hypertension, ESRD on dialysis,  GERD (gastroesophageal reflux disease), Hepatitis B, Hyperlipidemia, Hypertension, Nightmares, Obesity, DANIEL (obstructive sleep apnea) (11/12/2019), Secondary hyperparathyroidism of renal origin, Supraventricular tachycardia, Tachycardia, and Valvular regurgitation.    PSH:   has a past surgical history that includes ASD repair; Cardiac valuve replacement (02/08/2017); Radiofrequency ablation (03/13/2017); Cardiac catheterization; AV Graft Creation (Left, 03/2017); Colonoscopy (N/A, 8/27/2019); Esophagogastroduodenoscopy (N/A, 8/27/2019); Removal of graft (Left, 7/2/2020); Colonoscopy (N/A, 9/3/2020); Right heart catheterization (Right, 8/12/2021); and Insertion of inflatable penile prosthesis (N/A, 5/21/2024).    FamHx: family history includes Anesthesia problems in his paternal uncle; Cancer in his mother; Diabetes in his paternal aunt and paternal aunt; Heart attack in his father; Heart failure in his paternal grandmother; Hypertension in his paternal aunt; Leukemia in his mother and paternal aunt; No Known Problems in his brother, sister, sister, and sister; Stroke in his paternal aunt; Suicide in his paternal uncle; Valvular heart disease in his maternal aunt.    SocHx:  reports that he has never smoked. He has never used smokeless tobacco. He reports that he does not drink alcohol and does not use drugs.      Physical Exam:  Vitals:    06/27/24 1433   BP: 122/63   Pulse: 86         General: A&Ox3, no apparent distress, no deformities  Neck: No masses, normal thyroid  Lungs: normal inspiration, no use of accessory muscles  Heart: normal pulse, no arrhythmias  Abdomen: Soft, NT, ND  Skin: The skin is warm and dry. No jaundice.  Ext: No c/c/e.   : 6/27/24- significant scar tissue around the pump, can pump the prosthesis would having difficulty palpating the deflate button therefore did not want to cycle to completion, no signs of infection, the incision is well healed.  5/23- Test desc felicita, no abnormalities of  epididymus. Normal penile and scrotal skin. Meatus normal.    Labs/Studies:   Cysto negative 7/20  CT stone protocol   CT stone protocol normal 4/24  DARLENE bilateral complex renal cysts 2/22  CT urogram hyperdense cysts, no solid lesions 7/21  PSA 0.55 8/23    Impression/Plan:        1. Gross hematuria- no recurrent hematuria, no history of smoking.  Previous structural evaluation was negative by my partner.  Patient does not void.    2. Renal cyst- appeared to be benign, no further workup warranted.  His last CT scan showed resolution of the hematoma.    3. Erectile dysfunction-  IPP  5/21/24    Due to the persistent scar tissue he will attempt to mobilize and free up the surrounding area with manual palpation, if this fails he may require repositioning of the pump surgically.  Patient would like to attempt conservative measures for now.  Otherwise the pump does inflate, just having difficulty palpating the deflate mechanism.  No sign of infection, free for full activity.  Call with any complaints prior to the next procedure.  Of note cialis 20 mg, viagra 100 mg and now TriMix were not sufficient.

## 2024-07-02 ENCOUNTER — ANTI-COAG VISIT (OUTPATIENT)
Dept: CARDIOLOGY | Facility: CLINIC | Age: 54
End: 2024-07-02
Payer: MEDICARE

## 2024-07-02 ENCOUNTER — OFFICE VISIT (OUTPATIENT)
Dept: INTERNAL MEDICINE | Facility: CLINIC | Age: 54
End: 2024-07-02
Payer: MEDICARE

## 2024-07-02 ENCOUNTER — TELEPHONE (OUTPATIENT)
Dept: HEPATOLOGY | Facility: CLINIC | Age: 54
End: 2024-07-02
Payer: MEDICARE

## 2024-07-02 ENCOUNTER — LAB VISIT (OUTPATIENT)
Dept: LAB | Facility: HOSPITAL | Age: 54
End: 2024-07-02
Attending: PEDIATRICS
Payer: MEDICARE

## 2024-07-02 VITALS
SYSTOLIC BLOOD PRESSURE: 120 MMHG | BODY MASS INDEX: 25.44 KG/M2 | DIASTOLIC BLOOD PRESSURE: 64 MMHG | HEART RATE: 77 BPM | WEIGHT: 198.19 LBS | TEMPERATURE: 98 F | OXYGEN SATURATION: 93 % | RESPIRATION RATE: 17 BRPM | HEIGHT: 74 IN

## 2024-07-02 DIAGNOSIS — Z99.2 ANEMIA DUE TO CHRONIC KIDNEY DISEASE, ON CHRONIC DIALYSIS: Primary | ICD-10-CM

## 2024-07-02 DIAGNOSIS — N18.6 ANEMIA DUE TO CHRONIC KIDNEY DISEASE, ON CHRONIC DIALYSIS: Primary | ICD-10-CM

## 2024-07-02 DIAGNOSIS — Z79.01 ON COUMADIN FOR ATRIAL FIBRILLATION: ICD-10-CM

## 2024-07-02 DIAGNOSIS — N18.6 ANEMIA DUE TO CHRONIC KIDNEY DISEASE, ON CHRONIC DIALYSIS: ICD-10-CM

## 2024-07-02 DIAGNOSIS — D63.1 ANEMIA DUE TO CHRONIC KIDNEY DISEASE, ON CHRONIC DIALYSIS: Primary | ICD-10-CM

## 2024-07-02 DIAGNOSIS — D63.1 ANEMIA DUE TO CHRONIC KIDNEY DISEASE, ON CHRONIC DIALYSIS: ICD-10-CM

## 2024-07-02 DIAGNOSIS — Z99.2 ANEMIA DUE TO CHRONIC KIDNEY DISEASE, ON CHRONIC DIALYSIS: ICD-10-CM

## 2024-07-02 DIAGNOSIS — I48.91 ON COUMADIN FOR ATRIAL FIBRILLATION: ICD-10-CM

## 2024-07-02 DIAGNOSIS — Z79.01 LONG TERM (CURRENT) USE OF ANTICOAGULANTS: Primary | ICD-10-CM

## 2024-07-02 DIAGNOSIS — Z95.2 HISTORY OF MECHANICAL AORTIC VALVE REPLACEMENT: ICD-10-CM

## 2024-07-02 LAB
BASOPHILS # BLD AUTO: 0.02 K/UL (ref 0–0.2)
BASOPHILS NFR BLD: 0.3 % (ref 0–1.9)
CTP QC/QA: YES
DIFFERENTIAL METHOD BLD: ABNORMAL
EOSINOPHIL # BLD AUTO: 0.5 K/UL (ref 0–0.5)
EOSINOPHIL NFR BLD: 6.2 % (ref 0–8)
ERYTHROCYTE [DISTWIDTH] IN BLOOD BY AUTOMATED COUNT: 19.6 % (ref 11.5–14.5)
FERRITIN SERPL-MCNC: 1904 NG/ML (ref 20–300)
HCT VFR BLD AUTO: 28.6 % (ref 40–54)
HGB BLD-MCNC: 8.6 G/DL (ref 14–18)
IMM GRANULOCYTES # BLD AUTO: 0.01 K/UL (ref 0–0.04)
IMM GRANULOCYTES NFR BLD AUTO: 0.1 % (ref 0–0.5)
INR PPP: 2.3 (ref 2–3)
LYMPHOCYTES # BLD AUTO: 1.3 K/UL (ref 1–4.8)
LYMPHOCYTES NFR BLD: 15.9 % (ref 18–48)
MCH RBC QN AUTO: 31.3 PG (ref 27–31)
MCHC RBC AUTO-ENTMCNC: 30.1 G/DL (ref 32–36)
MCV RBC AUTO: 104 FL (ref 82–98)
MONOCYTES # BLD AUTO: 0.9 K/UL (ref 0.3–1)
MONOCYTES NFR BLD: 11.6 % (ref 4–15)
NEUTROPHILS # BLD AUTO: 5.2 K/UL (ref 1.8–7.7)
NEUTROPHILS NFR BLD: 65.9 % (ref 38–73)
NRBC BLD-RTO: 0 /100 WBC
PLATELET # BLD AUTO: 146 K/UL (ref 150–450)
PMV BLD AUTO: 11.3 FL (ref 9.2–12.9)
PROCALCITONIN SERPL IA-MCNC: 0.74 NG/ML
RBC # BLD AUTO: 2.75 M/UL (ref 4.6–6.2)
WBC # BLD AUTO: 7.91 K/UL (ref 3.9–12.7)

## 2024-07-02 PROCEDURE — 36415 COLL VENOUS BLD VENIPUNCTURE: CPT | Performed by: PEDIATRICS

## 2024-07-02 PROCEDURE — 85610 PROTHROMBIN TIME: CPT | Mod: PBBFAC

## 2024-07-02 PROCEDURE — 84145 PROCALCITONIN (PCT): CPT | Performed by: PEDIATRICS

## 2024-07-02 PROCEDURE — 99215 OFFICE O/P EST HI 40 MIN: CPT | Mod: PBBFAC | Performed by: PEDIATRICS

## 2024-07-02 PROCEDURE — 82728 ASSAY OF FERRITIN: CPT | Performed by: PEDIATRICS

## 2024-07-02 PROCEDURE — 85025 COMPLETE CBC W/AUTO DIFF WBC: CPT | Performed by: PEDIATRICS

## 2024-07-02 PROCEDURE — 99999 PR PBB SHADOW E&M-EST. PATIENT-LVL V: CPT | Mod: PBBFAC,,, | Performed by: PEDIATRICS

## 2024-07-02 PROCEDURE — 99999PBSHW POCT INR: Mod: PBBFAC,,,

## 2024-07-02 NOTE — PROGRESS NOTES
"Patient ID: Isidro White Jr. is a 54 y.o. male.    Chief Complaint: Hospital Follow Up    History of Present Illness    CHIEF COMPLAINT:  Patient presents today for hospital follow-up after surgery.    RECENT HOSPITALIZATION:  He was admitted to [facility] on the 19th, underwent surgery on the 21st, and was discharged on the 1st for implantable penile pump. Complicated by bleeding, needing transfusions. He had to return due to low hemoglobin levels and received a blood transfusion at [facility]. Despite the transfusion, his hemoglobin dropped initially but has remained at 8.9 since then. He denies any active bleeding from the surgical site or blood in urine. He states he is getting EPO through dialysis.    PROSTHESIS COMPLICATIONS:  Approximately one month ago he underwent surgery for prosthesis implantation at [facility]. He experienced postoperative bleeding complications requiring readmission and blood transfusion. The prosthesis button has become jammed due to scar tissue formation in the testicles, causing malpositioning. The physician recommended attempting manual massage to break up scar tissue, but he expresses hesitancy about undergoing another surgery to reposition the prosthesis, stating "No, you're not cutting on me no more, man. That shit had me messed up." He denies any current bleeding from the prosthesis site or hematuria.    DIALYSIS AND ANEMIA:  He is on dialysis with hemoglobin levels typically around 10-11 g/dL. However, his hemoglobin has not been increasing despite being on medication for anemia management. The nephrologist, Dr. Eugene, is trying to determine the reason for the lack of hemoglobin increase. He denies any bleeding, infection, or other concerning symptoms that could explain the low hemoglobin levels. He feels better when hemoglobin is higher around 10-11 g/dL range.    RECENT TESTING:  CT on Thursday showed multicystic kidneys without evidence of cancer. The " scan also revealed a small amount of fluid around his prosthesis. He denies noticing any blood in urine, bleeding from implant site, black stools, or skin sores that could indicate a source of bleeding or infection.    GI HISTORY:  He had a colonoscopy and upper endoscopy approximately 5 years ago, during which a polyp was found. He denies any current symptoms of GI bleeding such as black or bloody stools, abdominal pain, or unintentional weight loss. He denies any other GI issues.    PMH, PSH, SH, FH reviewed with patient.    ROS:  General: -fever, -chills, -fatigue, -weight gain, -weight loss  Eyes: -vision changes, -redness, -discharge  ENT: -ear pain, -nasal congestion, -sore throat  Cardiovascular: -chest pain, -palpitations, -lower extremity edema  Respiratory: -cough, -shortness of breath  Gastrointestinal: -abdominal pain, -nausea, -vomiting, -diarrhea, -constipation, -blood in stool  Genitourinary: -dysuria, -hematuria, -frequency  Musculoskeletal: -joint pain, -muscle pain  Skin: -rash, -lesion  Neurological: -headache, -dizziness, -numbness, -tingling  Psychiatric: -anxiety, -depression, -sleep difficulty         Exam:  Physical Exam    General: No acute distress. Well-developed. Well-nourished.  Eyes: EOMI. Sclerae anicteric.  HENT: Normocephalic. Atraumatic. Nares patent. Moist oral mucosa.  Cardiovascular: Regular rate. Regular rhythm. No murmurs. No rubs. No gallops. Normal S1, S2.  Respiratory: Normal respiratory effort. Clear to auscultation bilaterally. No rales. No rhonchi. No wheezing.  Abdomen: Soft. Non-tender. Non-distended. Normoactive bowel sounds.  Musculoskeletal: No  obvious deformity.  Extremities: No lower extremity edema.  Neurological: Alert & oriented x3. No slurred speech. Normal gait.  Psychiatric: Normal mood. Normal affect. Good insight. Good judgment.  Skin: Warm. Dry. No rash.         Assessment/Plan:  Anemia due to chronic kidney disease, on chronic dialysis  -     Cancel:  Ambulatory referral/consult to Endo Procedure ; Future; Expected date: 07/03/2024  -     Ferritin; Future; Expected date: 07/02/2024  -     CBC Auto Differential; Future; Expected date: 07/02/2024  -     Procalcitonin; Future; Expected date: 07/02/2024  -     Ambulatory referral/consult to Endo Procedure ; Future; Expected date: 07/03/2024         Assessment & Plan    K63.5 Polyp of colon  Z99.2 Dependence on renal dialysis  T85.618A Breakdown (mechanical) of other specified internal prosthetic devices, implants and grafts, initial encounter  E87.79 Other fluid overload  Z47.1 Aftercare following joint replacement surgery  Q61.4 Renal dysplasia  K92.2 Gastrointestinal hemorrhage, unspecified  D50.8 Other iron deficiency anemias  N50.89 Other specified disorders of the male genital organs  K92.89 Other specified diseases of the digestive system  D64.9 Anemia, unspecified  Z79.899 Other long term (current) drug therapy  Z51.11 Encounter for antineoplastic chemotherapy  T85.698A Other mechanical complication of other specified internal prosthetic devices, implants and grafts, initial encounter  ANEMIA AND LOW HEMOGLOBIN LEVELS:  - Considering potential sources of ongoing blood loss to explain persistently low hemoglobin levels despite EPO treatment, as nephrologist has ruled out infection.  - Will check iron studies, CBC, and procalcitonin to evaluate for infection or iron deficiency.  - If anemia persists despite optimized EPO dosing, may need referral to hematology for bone marrow biopsy to assess RBC production.  - Will consider referral to Hematology for bone marrow biopsy if anemia persists despite optimized EPO therapy.  GASTROINTESTINAL ISSUES AND COLON POLYP:  - Recommending repeat upper endoscopy and colonoscopy given history of colon polyp and 5 years since last screening, to rule out GI blood loss.  - Referred to Gastroenterology for upper endoscopy and colonoscopy.  - Endoscopy department  will call to schedule upper endoscopy and colonoscopy.  LAB FINDINGS:  - CBC, iron studies, and procalcitonin ordered.  - Patient will be contacted if any abnormalities found on labs ordered today.          Visit today included increased complexity associated with the care of the episodic problem  addressed and managing the longitudinal care of the patient due to the serious and/or complex managed problem(s) .      No follow-ups on file.    This note was generated with the assistance of ambient listening technology. Verbal consent was obtained by the patient and accompanying visitor(s) for the recording of patient appointment to facilitate this note. I attest to having reviewed and edited the generated note for accuracy, though some syntax or spelling errors may persist. Please contact the author of this note for any clarification.

## 2024-07-02 NOTE — TELEPHONE ENCOUNTER
----- Message from April Medina sent at 7/2/2024  3:48 PM CDT -----  Contact: Isidro Rubi is calling in regard to the missed appt scheduled on 07/02 and would like to get a call back to discuss appt availability to be seen on 07/03 if possible.  Please call back at [phone]       Thanks

## 2024-07-02 NOTE — PROGRESS NOTES
Patient's INR is therapeutic at 2.3. Patient reports he followed previous instructions. Patient reports no changes. Instructions given: continue warfarin 5 mg every day. Recheck on 7/11/24 with other appointment. Calendar reviewed with patient. Patient verbalizes understanding.

## 2024-07-03 ENCOUNTER — TELEPHONE (OUTPATIENT)
Dept: INTERNAL MEDICINE | Facility: CLINIC | Age: 54
End: 2024-07-03
Payer: MEDICARE

## 2024-07-03 DIAGNOSIS — D53.9 MACROCYTIC ANEMIA: Primary | ICD-10-CM

## 2024-07-03 NOTE — TELEPHONE ENCOUNTER
Contact pt regarding results, pt understood everything, tried to get patient scheduled for hematology. Patient refused. [facility]

## 2024-07-05 ENCOUNTER — HOSPITAL ENCOUNTER (OUTPATIENT)
Dept: PREADMISSION TESTING | Facility: HOSPITAL | Age: 54
Discharge: HOME | End: 2024-07-05
Attending: PEDIATRICS
Payer: MEDICARE

## 2024-07-05 DIAGNOSIS — D63.1 ANEMIA DUE TO CHRONIC KIDNEY DISEASE, ON CHRONIC DIALYSIS: ICD-10-CM

## 2024-07-05 DIAGNOSIS — Z99.2 ANEMIA DUE TO CHRONIC KIDNEY DISEASE, ON CHRONIC DIALYSIS: ICD-10-CM

## 2024-07-05 DIAGNOSIS — N18.6 ANEMIA DUE TO CHRONIC KIDNEY DISEASE, ON CHRONIC DIALYSIS: ICD-10-CM

## 2024-07-11 ENCOUNTER — OFFICE VISIT (OUTPATIENT)
Dept: UROLOGY | Facility: CLINIC | Age: 54
End: 2024-07-11
Payer: MEDICARE

## 2024-07-11 ENCOUNTER — ANTI-COAG VISIT (OUTPATIENT)
Dept: CARDIOLOGY | Facility: CLINIC | Age: 54
End: 2024-07-11
Payer: MEDICARE

## 2024-07-11 VITALS — WEIGHT: 196.63 LBS | HEIGHT: 74 IN | BODY MASS INDEX: 25.23 KG/M2

## 2024-07-11 DIAGNOSIS — Z79.01 LONG TERM (CURRENT) USE OF ANTICOAGULANTS: Primary | ICD-10-CM

## 2024-07-11 DIAGNOSIS — R31.0 GROSS HEMATURIA: Primary | ICD-10-CM

## 2024-07-11 DIAGNOSIS — I48.91 ATRIAL FIBRILLATION, UNSPECIFIED TYPE: ICD-10-CM

## 2024-07-11 DIAGNOSIS — N52.9 ERECTILE DYSFUNCTION, UNSPECIFIED ERECTILE DYSFUNCTION TYPE: ICD-10-CM

## 2024-07-11 DIAGNOSIS — I48.91 ON COUMADIN FOR ATRIAL FIBRILLATION: ICD-10-CM

## 2024-07-11 DIAGNOSIS — Z79.01 ON COUMADIN FOR ATRIAL FIBRILLATION: ICD-10-CM

## 2024-07-11 DIAGNOSIS — Z95.2 HISTORY OF MECHANICAL AORTIC VALVE REPLACEMENT: ICD-10-CM

## 2024-07-11 LAB
CTP QC/QA: YES
INR PPP: 1.7 (ref 2–3)

## 2024-07-11 PROCEDURE — 99213 OFFICE O/P EST LOW 20 MIN: CPT | Mod: PBBFAC | Performed by: UROLOGY

## 2024-07-11 PROCEDURE — 99999PBSHW POCT INR: Mod: PBBFAC,,,

## 2024-07-11 PROCEDURE — 85610 PROTHROMBIN TIME: CPT | Mod: PBBFAC

## 2024-07-11 PROCEDURE — 99999 PR PBB SHADOW E&M-EST. PATIENT-LVL III: CPT | Mod: PBBFAC,,, | Performed by: UROLOGY

## 2024-07-11 PROCEDURE — 99024 POSTOP FOLLOW-UP VISIT: CPT | Mod: POP,,, | Performed by: UROLOGY

## 2024-07-11 RX ORDER — WARFARIN SODIUM 5 MG/1
TABLET ORAL
Qty: 30 TABLET | Refills: 6 | Status: SHIPPED | OUTPATIENT
Start: 2024-07-11

## 2024-07-11 RX ORDER — OXYCODONE AND ACETAMINOPHEN 5; 325 MG/1; MG/1
1 TABLET ORAL EVERY 4 HOURS PRN
Qty: 20 TABLET | Refills: 0 | Status: SHIPPED | OUTPATIENT
Start: 2024-07-11

## 2024-07-11 RX ORDER — WARFARIN SODIUM 5 MG/1
TABLET ORAL DAILY
COMMUNITY
End: 2024-07-11 | Stop reason: SDUPTHER

## 2024-07-11 NOTE — PROGRESS NOTES
Chief Complaint:   Encounter Diagnoses   Name Primary?    Gross hematuria Yes    Erectile dysfunction, unspecified erectile dysfunction type          HPI:   7/11/24- patient is here today for wound evaluation, he has been manipulating the scrotum as instructed.  Patient has canceled his hematology and GI appointments.       2/28/19: 50 yo man voids about once a day since he is a MWF dialysis pt.  Saw a clot and gross hematuria about 2-3 weeks ago on one occasion.  No abd/pelvic pain and no exac/rel factors.  No urolithiasis.  Weak stream.  No  history.  Normal sexual function until recently.  Had a CT Urogram that shows no abnormalities except renal cystic function consistent with ESRD.      Allergies:  Patient has no known allergies.    Medications:  has a current medication list which includes the following prescription(s): albuterol, ascorbic acid (vitamin c), shawna-nataly, calcium acetate(phosphat bind), calcium acetate(phosphat bind), diltiazem, ferrous sulfate, hydroxyzine hcl, labetalol, labetalol, multivitamin with minerals, omega-3 fatty acids-vitamin e, oxycodone-acetaminophen, pantoprazole, rosuvastatin, tenofovir disoproxil fumarate, tenofovir disoproxil fumarate, vitamin d, warfarin, and warfarin, and the following Facility-Administered Medications: 0.9% nacl and sodium chloride 0.9%.    Review of Systems:  General: No fever, chills, fatigability, or weight loss.  Skin: No rashes, itching, or changes in color or texture of skin.  Chest: Denies HAY, cyanosis, wheezing, cough, and sputum production.  Abdomen: Appetite fine. No weight loss. Denies diarrhea, abdominal pain, hematemesis, or blood in stool.  Musculoskeletal: No joint stiffness or swelling. Denies back pain.  : As above.  All other review of systems negative.    PMH:   has a past medical history of Anemia, Aortic valve stenosis, Atrial fibrillation, Atrial flutter, Cardiomyopathy, CHF (congestive heart failure), Drug-induced erectile  dysfunction, ESRD due to hypertension, ESRD on dialysis, GERD (gastroesophageal reflux disease), Hepatitis B, Hyperlipidemia, Hypertension, Nightmares, Obesity, DANIEL (obstructive sleep apnea) (11/12/2019), Secondary hyperparathyroidism of renal origin, Supraventricular tachycardia, Tachycardia, and Valvular regurgitation.    PSH:   has a past surgical history that includes ASD repair; Cardiac valuve replacement (02/08/2017); Radiofrequency ablation (03/13/2017); Cardiac catheterization; AV Graft Creation (Left, 03/2017); Colonoscopy (N/A, 8/27/2019); Esophagogastroduodenoscopy (N/A, 8/27/2019); Removal of graft (Left, 7/2/2020); Colonoscopy (N/A, 9/3/2020); Right heart catheterization (Right, 8/12/2021); and Insertion of inflatable penile prosthesis (N/A, 5/21/2024).    FamHx: family history includes Anesthesia problems in his paternal uncle; Cancer in his mother; Diabetes in his paternal aunt and paternal aunt; Heart attack in his father; Heart failure in his paternal grandmother; Hypertension in his paternal aunt; Leukemia in his mother and paternal aunt; No Known Problems in his brother, sister, sister, and sister; Stroke in his paternal aunt; Suicide in his paternal uncle; Valvular heart disease in his maternal aunt.    SocHx:  reports that he has never smoked. He has never used smokeless tobacco. He reports that he does not drink alcohol and does not use drugs.      Physical Exam:  There were no vitals filed for this visit.        General: A&Ox3, no apparent distress, no deformities  Neck: No masses, normal thyroid  Lungs: normal inspiration, no use of accessory muscles  Heart: normal pulse, no arrhythmias  Abdomen: Soft, NT, ND  Skin: The skin is warm and dry. No jaundice.  Ext: No c/c/e.   : 7/11/24- pump is easily palpated today, myself and the patient were both able to inflate and deflate prosthesis appropriately.  Tips are positioned within the glans.  6/27/24- significant scar tissue around the pump, can  pump the prosthesis would having difficulty palpating the deflate button therefore did not want to cycle to completion, no signs of infection, the incision is well healed.  5/23- Test desc felicita, no abnormalities of epididymus. Normal penile and scrotal skin. Meatus normal.    Labs/Studies:   Cysto negative 7/20  CT stone protocol prosthesis in place with minimal seroma, normal 6/24  CT stone protocol normal 4/24  DARLENE bilateral complex renal cysts 2/22  CT urogram hyperdense cysts, no solid lesions 7/21  PSA 0.55 8/23    Impression/Plan:        1. Gross hematuria- no recurrent hematuria, no history of smoking.  Previous structural evaluation was negative by my partner.  Patient does not void.  Patient was previously set up to see GI and hematology and canceled, will reschedule.    2. Renal cyst- appeared to be benign, no further workup warranted.    3. Erectile dysfunction-  IPP  5/21/24    Patient has done well with manual mobilization and the pump is now easily palpated, no surgery indicated.  The prosthesis inflates and deflates appropriately, myself and the patient were both able to perform these duties.  He will continue to practice, re-evaluate in 1 month.  Call with any complaints prior to the next procedure.  Of note cialis 20 mg, viagra 100 mg and now TriMix were not sufficient.

## 2024-07-11 NOTE — PROGRESS NOTES
Patient's INR is sub-therapeutic at 1.7. Patient reports he has been eating more dark leafy greens to help increase hemoglobin which was 7.9 on yesterday when he went to [facility] ED. Patient reports he is taking Bloom powdered vitamin supplement also. Patient reports he is experiencing shortness of breath on exertion, weakness and fatigue. Sent to PharmD for dosing/instructions.

## 2024-07-11 NOTE — PROGRESS NOTES
No boost today as he reports that H/H is low.  However, he should not eat greens to correct this.  Report to ER with increased SOB, weakness, or DZ.  Resume dose and repeat INR In 1 week.

## 2024-07-19 ENCOUNTER — TELEPHONE (OUTPATIENT)
Dept: CARDIOLOGY | Facility: CLINIC | Age: 54
End: 2024-07-19
Payer: MEDICARE

## 2024-07-19 NOTE — TELEPHONE ENCOUNTER
Returned call to Ana at Dr. Blunt office. Left voicemail for a return call to the clinic regarding pt clearance.     ----- Message from Xiomy Sales sent at 7/18/2024  4:17 PM CDT -----  Regarding: Clearance  Name of Who is Calling:Ana( Dr. Kenny Blunt Office)           What is the request in detail: Ana ia requesting a call back about a request that was sent over.            Can the clinic reply by [facility]:No           What Number to Call Back if not in [facility]: [phone] ext:[phone]

## 2024-07-22 DIAGNOSIS — B18.1 CHRONIC VIRAL HEPATITIS B WITHOUT DELTA AGENT AND WITHOUT COMA: ICD-10-CM

## 2024-07-23 ENCOUNTER — OFFICE VISIT (OUTPATIENT)
Dept: CARDIOLOGY | Facility: CLINIC | Age: 54
End: 2024-07-23
Payer: MEDICARE

## 2024-07-23 ENCOUNTER — ANTI-COAG VISIT (OUTPATIENT)
Dept: CARDIOLOGY | Facility: CLINIC | Age: 54
End: 2024-07-23
Payer: MEDICARE

## 2024-07-23 VITALS
OXYGEN SATURATION: 95 % | WEIGHT: 198.88 LBS | HEIGHT: 74 IN | BODY MASS INDEX: 25.52 KG/M2 | HEART RATE: 80 BPM | DIASTOLIC BLOOD PRESSURE: 65 MMHG | SYSTOLIC BLOOD PRESSURE: 115 MMHG

## 2024-07-23 DIAGNOSIS — I48.91 ON COUMADIN FOR ATRIAL FIBRILLATION: ICD-10-CM

## 2024-07-23 DIAGNOSIS — I10 ESSENTIAL HYPERTENSION: ICD-10-CM

## 2024-07-23 DIAGNOSIS — Z98.890 HISTORY OF RADIOFREQUENCY ABLATION FOR COMPLEX RIGHT ATRIAL ARRHYTHMIA: ICD-10-CM

## 2024-07-23 DIAGNOSIS — Z79.01 ANTICOAGULATED: ICD-10-CM

## 2024-07-23 DIAGNOSIS — N18.6 ESRD (END STAGE RENAL DISEASE) ON DIALYSIS: ICD-10-CM

## 2024-07-23 DIAGNOSIS — Z79.01 ON COUMADIN FOR ATRIAL FIBRILLATION: ICD-10-CM

## 2024-07-23 DIAGNOSIS — I49.3 PVC'S (PREMATURE VENTRICULAR CONTRACTIONS): ICD-10-CM

## 2024-07-23 DIAGNOSIS — I47.10 SVT (SUPRAVENTRICULAR TACHYCARDIA): ICD-10-CM

## 2024-07-23 DIAGNOSIS — N18.6 END-STAGE RENAL DISEASE: ICD-10-CM

## 2024-07-23 DIAGNOSIS — Z95.2 H/O MECHANICAL AORTIC VALVE REPLACEMENT: ICD-10-CM

## 2024-07-23 DIAGNOSIS — I50.32 CHRONIC DIASTOLIC HEART FAILURE: ICD-10-CM

## 2024-07-23 DIAGNOSIS — I47.29 NSVT (NONSUSTAINED VENTRICULAR TACHYCARDIA): ICD-10-CM

## 2024-07-23 DIAGNOSIS — I70.0 AORTIC ATHEROSCLEROSIS: ICD-10-CM

## 2024-07-23 DIAGNOSIS — I27.20 PULMONARY HTN: ICD-10-CM

## 2024-07-23 DIAGNOSIS — I11.0 HYPERTENSIVE CARDIOMEGALY WITH HEART FAILURE: ICD-10-CM

## 2024-07-23 DIAGNOSIS — Z79.01 LONG TERM (CURRENT) USE OF ANTICOAGULANTS: Primary | ICD-10-CM

## 2024-07-23 DIAGNOSIS — I48.0 PAF (PAROXYSMAL ATRIAL FIBRILLATION): ICD-10-CM

## 2024-07-23 DIAGNOSIS — I27.20 PULMONARY HYPERTENSION: ICD-10-CM

## 2024-07-23 DIAGNOSIS — Z95.2 HISTORY OF MECHANICAL AORTIC VALVE REPLACEMENT: ICD-10-CM

## 2024-07-23 DIAGNOSIS — Z01.810 PREOP CARDIOVASCULAR EXAM: Primary | ICD-10-CM

## 2024-07-23 DIAGNOSIS — I47.19 ATRIAL TACHYCARDIA: ICD-10-CM

## 2024-07-23 DIAGNOSIS — Z99.2 ESRD (END STAGE RENAL DISEASE) ON DIALYSIS: ICD-10-CM

## 2024-07-23 DIAGNOSIS — Z95.2 S/P AVR (AORTIC VALVE REPLACEMENT): ICD-10-CM

## 2024-07-23 DIAGNOSIS — Z79.01 LONG TERM (CURRENT) USE OF ANTICOAGULANTS: ICD-10-CM

## 2024-07-23 LAB
CTP QC/QA: YES
INR PPP: 2 (ref 2–3)

## 2024-07-23 PROCEDURE — 99214 OFFICE O/P EST MOD 30 MIN: CPT | Mod: PBBFAC | Performed by: INTERNAL MEDICINE

## 2024-07-23 PROCEDURE — 99999 PR PBB SHADOW E&M-EST. PATIENT-LVL IV: CPT | Mod: PBBFAC,,, | Performed by: INTERNAL MEDICINE

## 2024-07-23 PROCEDURE — 85610 PROTHROMBIN TIME: CPT | Mod: PBBFAC

## 2024-07-23 PROCEDURE — 99999PBSHW POCT INR: Mod: PBBFAC,,,

## 2024-07-23 RX ORDER — TENOFOVIR DISOPROXIL FUMARATE 300 MG/1
300 TABLET, FILM COATED ORAL WEEKLY
Qty: 4 TABLET | Refills: 2 | Status: ACTIVE | OUTPATIENT
Start: 2024-07-23

## 2024-07-23 NOTE — PROGRESS NOTES
Subjective:   Patient ID:  Isidro White Jr. is a 54 y.o. male who presents for cardiac consult of No chief complaint on file.          The patient came in today for cardiac consult of No chief complaint on file.      Isidro White Jr. is a 54 y.o. male pt with PAF, ASD closure at age 8 ([facility]), HFpEF, s/p AVR with a 22 mm mechanical valve and TV annuloplasty with a 28 mm ring 3/17, HTN, PHTN, Mitral stenosis, SVT, EP performed RFA (3/13/17) and has been on HD - MWF since Feb 8,2016 here for CV follow up.     6/25/24     Hospital Course:  54-year-old gentleman who was admitted preoperatively on May 19th for stabilization.  Nephrology and Cardiology were consulted, Medicine was also consulted to assist with the care.  Patient was initiated on dialysis hospital day 1.  Hospital day 2.  On May 21st patient was taken the operating room for insertion of inflatable penile prosthesis.  Patient initially did well, with no issues.  Unfortunately had some postoperative bleeding around his drain site, patient's most likely lost significant amount of blood and required 3 units transfused over the next 2 days.  Due to his reaction during dialysis postoperative day 1. Patient was taken to the ICU for acute monitoring.  Patient remains in the ICU until 524 when he was deemed stable to transfer back to the floor.  Patient's hemoglobin hematocrit will follow closely, no evidence residual bleeding after the drain was removed and stitched on proximally 05/23/2024.  Hemoglobin hematocrit demonstrates stability though over the evening to his discharge date of 05/26/2024.  Patient has swelling had reduced upon discharge, patient's INR was stable on his home dose of Coumadin.  During hospitalization Nephrology maintain care cystic with dialysis.  Hospital medicine had to monitor his blood pressure and pulse modifying his home medications, please see the chart note.  Either way patient was tolerating p.o. diet and pain  medications by postoperative day number 5 and deemed stable for discharge to home.  Patient will continue another 4 days of Levaquin, pain medication has been given, he has been instructed on how to take his Coumadin until he can returned to his Coumadin clinic next week.  Patient will go back to his usual dialysis clinic tomorrow.     Hosp follow up - was recently at [facility] for SOB and was more anemic    Component Ref Range & Units 1 mo ago  (5/26/24) 1 mo ago  (5/25/24) 1 mo ago  (5/24/24) 1 mo ago  (5/22/24) 1 mo ago  (5/22/24) 1 mo ago  (5/22/24) 1 mo ago  (5/22/24)   WBC 3.90 - 12.70 K/uL 10.88 10.98 10.75   11.77 11.17   RBC 4.60 - 6.20 M/uL 2.61 Low  2.58 Low  2.10 Low    2.23 Low  2.62 Low    Hemoglobin 14.0 - 18.0 g/dL 7.9 Low  7.9 Low  6.8 Low   8.7 Low  7.3 Low  8.5 Low    Hematocrit 40.0 - 54.0 % 24.2 Low  23.9 Low  19.8 Low Panic  CM 26.2 Low   22.2 Low  26.6 Low      He was given 2 units blood at hospital post op and then went to [facility] and was given another unit of blood.   ECHO 5/2024 with normal bi V function, grade 2 DD, AV valve stable - mechanical. Mild to mod MS - MG 11 mmHg, mild MR, mild to mod TR. Pulm HTN - PASP 70 mmHg.     7/23/24  BP and HR stable. Recently went to [facility] for blood transfusion - Hgb was 6.9 did not receive any blood then.   He needs to have EGD/Cscope - will need to be off coumadin and Lovenox bridge.         Results for orders placed during the hospital encounter of 05/02/24    Echo    Interpretation Summary    Left Ventricle: The left ventricle is normal in size. Normal wall thickness. There is concentric remodeling. Normal wall motion. Ejection fraction by visual approximation is 60%. Grade II diastolic dysfunction.    Right Ventricle: Normal right ventricular cavity size. Wall thickness is normal. Systolic function is normal.    Left Atrium: Left atrium is severely dilated.    Aortic Valve: There is a tilting disc prosthetic valve in the aortic position.    Mitral Valve:  Moderately calcified leaflets. Moderately restricted motion. There is mild to moderate stenosis. MVA 2.6 cm2 by planimetry, 1.6 cm2 by PHT.  The mean pressure gradient across the mitral valve is 11 mmHg at a heart rate of  bpm. There is mild regurgitation.    Tricuspid Valve: There is mild to moderate regurgitation.    Pulmonary Artery: There is pulmonary hypertension. The estimated pulmonary artery systolic pressure is 70 mmHg.    IVC/SVC: Normal venous pressure at 3 mmHg.      Results for orders placed during the hospital encounter of 04/27/23    Echo    Interpretation Summary  · The left ventricle is normal in size with concentric remodeling and low normal systolic function.  · Mild left atrial enlargement.  · Grade I left ventricular diastolic dysfunction.  · The estimated PA systolic pressure is 47 mmHg.  · Normal right ventricular size with normal right ventricular systolic function.  · There is pulmonary hypertension.  · Normal central venous pressure (3 mmHg).  · There is a bileaflet tilting disc mechanical aortic valve present. Prosthetic aortic valve is normal.  · The aortic valve mean gradient is 19 mmHg with a dimensionless index of 0.47.  · The estimated ejection fraction is 50%.  · The mean diastolic gradient across the mitral valve is 11 mmHg at a heart rate of 76 bpm.  · There is mild to moderate mitral stenosis.  · Mild to moderate tricuspid regurgitation.      Results for orders placed during the hospital encounter of 07/07/22    Echo    Interpretation Summary  · The left ventricle is normal in size with moderate concentric hypertrophy and normal systolic function.  · Mild left atrial enlargement.  · The estimated ejection fraction is 60%.  · Grade II left ventricular diastolic dysfunction.  · There is abnormal septal wall motion consistent with post-operative status.  · Normal right ventricular size with normal right ventricular systolic function.  · Mild right atrial enlargement.  · There is a  bileaflet tilting disc mechanical aortic valve present.  · The aortic valve mean gradient is 15 mmHg with a dimensionless index of 0.52.  · The mean diastolic gradient across the mitral valve is 11 mmHg at a heart rate of bpm.  · There is mild to moderate mitral stenosis.  · There is moderate pulmonary hypertension.  · Mild tricuspid regurgitation.  · Intermediate central venous pressure (8 mmHg).  · The estimated PA systolic pressure is 54 mmHg.  · The aortic root is mildly dilated.      RHC  Summary       The estimated blood loss was <50 mL.  Believe PA pressures are elevated in setting of increased filling pressures. Mean PA pressure is right around 34.  Recommend stricter volume management, review of echoes for the last 3 year demonstrates similar findings.  If this is a barrier to renal transplant, can consider adding PDE5, however do not think he will be able to tolerate given increased filling pressure and patient stating he drops pressures with HD.  Will likely need to discuss with abdominal transplant team.     The procedure log was documented by Documenter: Isis Sevilla and verified by Mariah Pierce MD.      Results for orders placed during the hospital encounter of 05/25/21    Nuclear Stress - Cardiology Interpreted    Interpretation Summary    Normal myocardial perfusion scan. There is no evidence of myocardial ischemia or infarction.    The gated perfusion images showed an ejection fraction of 65% at rest. The gated perfusion images showed an ejection fraction of 47% post stress.    The EKG portion of this study is negative for ischemia.    · Intermediate central venous pressure (8 mmHg).       Cath 2/17  1. Coronary angiography.      a.     Left main widely patent.      b.     LAD widely patent.      c.     Ramus widely patent and massive in size.      d.     Circumflex widely patent with a small OM 1 and OM 2 branch.      e.     Right coronary widely patent.  2. Hemodynamics:  Right heart  catheterization.      a.     Pulmonary capillary wedge pressure 33 at end-expiration.      b.     Pulmonary artery pressure 78/42.      c.     Right ventricular pressure 70/21.      d.     Right atrial pressure 22 with a peak V-wave of 24.      e.     Cardiac output by thermodilution is between 6 and 7       L/minute, by Patricia 6.1 L/minute.    CONCLUSION  1. Normal coronary arteries.  2. Pulmonary hypertension.  3. Severe aortic insufficiency.      Past Medical History:   Diagnosis Date    Anemia     Aortic valve stenosis     s/p mechanical AVR    Atrial fibrillation     Atrial flutter     Cardiomyopathy     CHF (congestive heart failure)     Drug-induced erectile dysfunction     ESRD due to hypertension     HD M, W, F    ESRD on dialysis     GERD (gastroesophageal reflux disease)     Hepatitis B     Hyperlipidemia     Hypertension     Nightmares     Obesity     DANIEL (obstructive sleep apnea) 11/12/2019    Secondary hyperparathyroidism of renal origin     Supraventricular tachycardia     atrial tachycardia    Tachycardia     Valvular regurgitation     AI, TR       Past Surgical History:   Procedure Laterality Date    ASD REPAIR      age 8 [facility]    AV Graft Creation Left 03/2017    CARDIAC CATHETERIZATION      [facility]     CARDIAC VALVE SURGERY  02/08/2017    22 mm Medtronic AV, 28 mm TV Medtronic annuloplaty ring    COLONOSCOPY N/A 8/27/2019    Procedure: COLONOSCOPY;  Surgeon: Addie John MD;  Location: [facility];  Service: Endoscopy;  Laterality: N/A;  dialysis pt *needs K*    COLONOSCOPY N/A 9/3/2020    Procedure: COLONOSCOPY-need INR;  Surgeon: Jemma Youngblood MD;  Location: [facility];  Service: Endoscopy;  Laterality: N/A;    ESOPHAGOGASTRODUODENOSCOPY N/A 8/27/2019    Procedure: EGD (ESOPHAGOGASTRODUODENOSCOPY);  Surgeon: Addie John MD;  Location: [facility];  Service: Endoscopy;  Laterality: N/A;    INSERTION OF INFLATABLE PENILE PROSTHESIS N/A 5/21/2024    Procedure: INSERTION, PENILE  PROSTHESIS, INFLATABLE;  Surgeon: Bonifacio Shirley MD;  Location: [facility] OR;  Service: Urology;  Laterality: N/A;    RADIOFREQUENCY ABLATION  03/13/2017    Atrial tachycardia    REMOVAL OF GRAFT Left 7/2/2020    Procedure: REMOVAL, GRAFT;  Surgeon: Sascha Sidhu MD;  Location: [facility] OR;  Service: Peripheral Vascular;  Laterality: Left;    RIGHT HEART CATHETERIZATION Right 8/12/2021    Procedure: INSERTION, CATHETER, RIGHT HEART;  Surgeon: Mariah Pierce MD;  Location: [facility] CATH LAB;  Service: Cardiology;  Laterality: Right;       Social History     Tobacco Use    Smoking status: Never    Smokeless tobacco: Never   Substance Use Topics    Alcohol use: No     Comment: quit in 2016; does have heavy drinking history; started drinking at age 13; was drinking a 12 pack over the weekend and two 24 ounces of beer a day during the week along with a pint of hard alcohol    Drug use: No       Family History   Problem Relation Name Age of Onset    Leukemia Mother      Cancer Mother      Heart attack Father          massive MI at age 67    No Known Problems Sister      No Known Problems Brother      No Known Problems Sister      No Known Problems Sister      Heart failure Paternal Grandmother      Diabetes Paternal Aunt      Hypertension Paternal Aunt      Leukemia Paternal Aunt          CLL    Suicide Paternal Uncle      Anesthesia problems Paternal Uncle      Diabetes Paternal Aunt      Stroke Paternal Aunt      Valvular heart disease Maternal Aunt      Kidney disease Neg Hx      Colon cancer Neg Hx             Review of Systems   Constitutional:  Positive for malaise/fatigue.   HENT: Negative.     Eyes: Negative.    Respiratory:  Negative for shortness of breath.    Cardiovascular:  Negative for chest pain and palpitations.   Gastrointestinal: Negative.    Genitourinary:  Negative for hematuria.   Musculoskeletal:  Positive for joint pain.   Skin: Negative.    Neurological: Negative.    Endo/Heme/Allergies: Negative.     Psychiatric/Behavioral: Negative.     All 12 systems otherwise negative.      Wt Readings from Last 3 Encounters:   07/11/24 89.2 kg (196 lb 10.4 oz)   07/02/24 89.9 kg (198 lb 3.1 oz)   06/27/24 90.5 kg (199 lb 8.3 oz)     Temp Readings from Last 3 Encounters:   07/02/24 97.9 °F (36.6 °C) (Tympanic)   05/26/24 98.6 °F (37 °C) (Oral)   03/12/24 96.4 °F (35.8 °C) (Tympanic)     BP Readings from Last 3 Encounters:   07/02/24 120/64   06/27/24 122/63   06/25/24 122/64     Pulse Readings from Last 3 Encounters:   07/02/24 77   06/27/24 86   06/25/24 75       There were no vitals taken for this visit.     Objective:   Physical Exam  Vitals and nursing note reviewed.   Constitutional:       General: He is not in acute distress.     Appearance: He is well-developed. He is not diaphoretic.   HENT:      Head: Normocephalic and atraumatic.      Nose: Nose normal.   Eyes:      General: No scleral icterus.     Conjunctiva/sclera: Conjunctivae normal.   Neck:      Thyroid: No thyromegaly.      Vascular: No JVD.   Cardiovascular:      Rate and Rhythm: Normal rate. Rhythm irregular.      Heart sounds: S1 normal and S2 normal. Murmur heard.      Midsystolic murmur is present at the upper right sternal border.      No friction rub. No gallop. No S3 or S4 sounds.      Comments: Crisp S2  Pulmonary:      Effort: Pulmonary effort is normal. No respiratory distress.      Breath sounds: Normal breath sounds. No stridor. No wheezing or rales.   Chest:      Chest wall: No tenderness.   Abdominal:      General: Bowel sounds are normal. There is no distension.      Palpations: Abdomen is soft. There is no mass.      Tenderness: There is no abdominal tenderness. There is no rebound.   Genitourinary:     Comments: Deferred  Musculoskeletal:         General: No tenderness or deformity. Normal range of motion.      Cervical back: Normal range of motion and neck supple.   Lymphadenopathy:      Cervical: No cervical adenopathy.   Skin:      General: Skin is warm and dry.      Coloration: Skin is not pale.      Findings: No erythema or rash.   Neurological:      Mental Status: He is alert and oriented to person, place, and time.      Motor: No abnormal muscle tone.      Coordination: Coordination normal.   Psychiatric:         Behavior: Behavior normal.         Thought Content: Thought content normal.         Judgment: Judgment normal.         Lab Results   Component Value Date     05/26/2024    K 4.3 05/26/2024    CL 95 05/26/2024    CO2 26 05/26/2024    BUN 75 (H) 05/26/2024    CREATININE 14.3 (H) 05/26/2024     05/26/2024    HGBA1C 4.9 03/12/2024    MG 2.3 08/12/2021    AST 60 (H) 05/26/2024    ALT 46 (H) 05/26/2024    ALBUMIN 2.7 (L) 05/26/2024    PROT 6.1 05/26/2024    BILITOT 0.5 05/26/2024    WBC 7.91 07/02/2024    HGB 8.6 (L) 07/02/2024    HCT 28.6 (L) 07/02/2024    HCT 25 (L) 02/08/2017     (H) 07/02/2024     (L) 07/02/2024    INR 1.7 (A) 07/11/2024    INR 2.2 (H) 05/26/2024    CHOL 248 (H) 03/12/2024    HDL 29 (L) 03/12/2024    LDLCALC 170.0 (H) 03/12/2024    TRIG 245 (H) 03/12/2024     (H) 07/01/2021     Assessment:      1. Long term (current) use of anticoagulants    2. On Coumadin for atrial fibrillation    3. History of mechanical aortic valve replacement    4. Chronic diastolic heart failure    5. SVT (supraventricular tachycardia)    6. Pulmonary hypertension    7. NSVT (nonsustained ventricular tachycardia)    8. PVC's (premature ventricular contractions)    9. S/P AVR (aortic valve replacement)    10. Anticoagulated    11. H/O mechanical aortic valve replacement    12. History of radiofrequency ablation for complex right atrial arrhythmia    13. End-stage renal disease    14. Aortic atherosclerosis    15. ESRD (end stage renal disease) on dialysis    16. Atrial tachycardia    17. PAF (paroxysmal atrial fibrillation)    18. Essential hypertension    19. Pulmonary HTN    20. Hypertensive cardiomegaly  with heart failure          Plan:   1. Chronic Diastolic HF  - cont low salt diet  - cont HD for volume removal    2. S/p mech AVR, with mild to moderate MS, mild to mod TR, pulm HTN   - cont coumadin  - f/u with coumadin clinic here  - f/u with CTS - in [place]  - ECHO in July 2022 with normal bi V function, stable valves AV and MV, mild to mod MS, PASP 54 mmHg  -ECHO 4/2023 with normal bi V function, grade 1 DD, mech AV with mean 19mmHg, mild to mod MS - 11 mmHg. Mild to mod TR, PASP 47 mmHg.   -ECHO 5/2024 with normal bi V function, grade 2 DD, AV valve stable - mechanical. Mild to mod MS - MG 11 mmHg, mild MR, mild to mod TR. Pulm HTN - PASP 70 mmHg.     3. ESRD - dry weight improved  - cont HD  - anemia with PRBCs and EPO - refer to hemeonc    4. HTN   - cont meds, Dilt 420 mg   - adjusted BP meds - labetelol TID    5. PVCs, SVT s/p RFA , Afib - in NSR now   - cont BB and CCB   - f/u with Dr. De León  - recurrence of his old arrhythmia or could be a new source (AFL, R atriotomy, perimitral).  - discussed ablation.     6. HLD  - cont statin    7. ED  - cont tx PRN  - f/u urology for penile implant surgery   - was on Cialis, start Viagra f/u with urology   - monitor BP, avoid nitrates     8. Pulm HTN/ DANIEL  - PASP 56 mmHg. - repeat ECHO ordered   -f/u CHF/pulm HTN clinic - may start PDE5  - f/u with Dr. Pierce    9. Chronic hep B   - cont tx per hepatology  - further workup pending for cirrhosis - EGD    10. Preop CV eval - EGD/Cscope  Elevated CV risk for procedures  OK to hold coumadin 5 days and need to be bridged with Lovenox and f/u with coumadin clnic       Visit today included increased complexity associated with the care of the episodic problem SVT addressed and managing the longitudinal care of the patient due to the serious and/or complex managed problem(s) .      Thank you for allowing me to participate in this patient's care. Please do not hesitate to contact me with any questions or concerns.  Consult note has been forwarded to the referral physician.     Fuad Gudino MD, [facility]  Cardiovascular Disease  [facility], [place]  [phone] (P)

## 2024-07-23 NOTE — PROGRESS NOTES
Patient's INR is therapeutic at 2.0. Patient reports he followed previous instructions. Patient reports he is scheduled to see a new hepatologist on 8/27/24 and will discuss having an EGD. Patient will advise Coumadin Clinic of any scheduled procedures. Instructions given: Continue warfarin 5 mg every day. Recheck on 8/1/24. Calendar reviewed with patient. Patient verbalizes understanding.

## 2024-07-23 NOTE — Clinical Note
HI can you please prescribe Lovenox and follow up his INR, not sure when his date for Cscope is yet. Thanks

## 2024-07-24 ENCOUNTER — PATIENT MESSAGE (OUTPATIENT)
Dept: HEPATOLOGY | Facility: CLINIC | Age: 54
End: 2024-07-24
Payer: MEDICARE

## 2024-07-25 ENCOUNTER — TELEPHONE (OUTPATIENT)
Dept: HEPATOLOGY | Facility: CLINIC | Age: 54
End: 2024-07-25
Payer: MEDICARE

## 2024-07-25 NOTE — TELEPHONE ENCOUNTER
Called pt and informed him that his medicine for Hep B had been called into the pharmacy at [facility].  The pt also asked if we could change appointment from virtual to in person.  Updated that as well.

## 2024-08-01 ENCOUNTER — LAB VISIT (OUTPATIENT)
Dept: LAB | Facility: HOSPITAL | Age: 54
End: 2024-08-01
Attending: UROLOGY
Payer: MEDICARE

## 2024-08-01 ENCOUNTER — ANTI-COAG VISIT (OUTPATIENT)
Dept: CARDIOLOGY | Facility: CLINIC | Age: 54
End: 2024-08-01
Payer: MEDICARE

## 2024-08-01 ENCOUNTER — OFFICE VISIT (OUTPATIENT)
Dept: UROLOGY | Facility: CLINIC | Age: 54
End: 2024-08-01
Payer: MEDICARE

## 2024-08-01 VITALS
DIASTOLIC BLOOD PRESSURE: 61 MMHG | BODY MASS INDEX: 25.52 KG/M2 | SYSTOLIC BLOOD PRESSURE: 98 MMHG | HEART RATE: 71 BPM | WEIGHT: 198.88 LBS | HEIGHT: 74 IN

## 2024-08-01 DIAGNOSIS — Z95.2 HISTORY OF MECHANICAL AORTIC VALVE REPLACEMENT: ICD-10-CM

## 2024-08-01 DIAGNOSIS — I48.91 ON COUMADIN FOR ATRIAL FIBRILLATION: ICD-10-CM

## 2024-08-01 DIAGNOSIS — Z79.01 ON COUMADIN FOR ATRIAL FIBRILLATION: ICD-10-CM

## 2024-08-01 DIAGNOSIS — R31.0 GROSS HEMATURIA: Primary | ICD-10-CM

## 2024-08-01 DIAGNOSIS — R31.0 GROSS HEMATURIA: ICD-10-CM

## 2024-08-01 DIAGNOSIS — Z79.01 LONG TERM (CURRENT) USE OF ANTICOAGULANTS: Primary | ICD-10-CM

## 2024-08-01 DIAGNOSIS — N52.9 ERECTILE DYSFUNCTION, UNSPECIFIED ERECTILE DYSFUNCTION TYPE: ICD-10-CM

## 2024-08-01 LAB
COMPLEXED PSA SERPL-MCNC: 0.41 NG/ML (ref 0–4)
CTP QC/QA: YES
INR PPP: 1.9 (ref 2–3)

## 2024-08-01 PROCEDURE — 85610 PROTHROMBIN TIME: CPT | Mod: PBBFAC

## 2024-08-01 PROCEDURE — 84153 ASSAY OF PSA TOTAL: CPT | Performed by: UROLOGY

## 2024-08-01 PROCEDURE — 36415 COLL VENOUS BLD VENIPUNCTURE: CPT | Performed by: UROLOGY

## 2024-08-01 PROCEDURE — 99999 PR PBB SHADOW E&M-EST. PATIENT-LVL IV: CPT | Mod: PBBFAC,,, | Performed by: UROLOGY

## 2024-08-01 PROCEDURE — 99214 OFFICE O/P EST MOD 30 MIN: CPT | Mod: PBBFAC | Performed by: UROLOGY

## 2024-08-01 PROCEDURE — 99999PBSHW POCT INR: Mod: PBBFAC,,,

## 2024-08-01 NOTE — PROGRESS NOTES
Chief Complaint:   Encounter Diagnoses   Name Primary?    Gross hematuria Yes    Erectile dysfunction, unspecified erectile dysfunction type          HPI:   8/1/24- still having some issues functioning his prosthesis, otherwise no hematuria.    2/28/19: 48 yo man voids about once a day since he is a MWF dialysis pt.  Saw a clot and gross hematuria about 2-3 weeks ago on one occasion.  No abd/pelvic pain and no exac/rel factors.  No urolithiasis.  Weak stream.  No  history.  Normal sexual function until recently.  Had a CT Urogram that shows no abnormalities except renal cystic function consistent with ESRD.      Allergies:  Patient has no known allergies.    Medications:  has a current medication list which includes the following prescription(s): albuterol, ascorbic acid (vitamin c), shawna-nataly, calcium acetate(phosphat bind), calcium acetate(phosphat bind), diltiazem, ferrous sulfate, hydroxyzine hcl, labetalol, labetalol, multivitamin with minerals, omega-3 fatty acids-vitamin e, oxycodone-acetaminophen, pantoprazole, rosuvastatin, tenofovir disoproxil fumarate, tenofovir disoproxil fumarate, vitamin d, warfarin, warfarin, and warfarin, and the following Facility-Administered Medications: 0.9% nacl and sodium chloride 0.9%.    Review of Systems:  General: No fever, chills, fatigability, or weight loss.  Skin: No rashes, itching, or changes in color or texture of skin.  Chest: Denies HAY, cyanosis, wheezing, cough, and sputum production.  Abdomen: Appetite fine. No weight loss. Denies diarrhea, abdominal pain, hematemesis, or blood in stool.  Musculoskeletal: No joint stiffness or swelling. Denies back pain.  : As above.  All other review of systems negative.    PMH:   has a past medical history of Anemia, Aortic valve stenosis, Atrial fibrillation, Atrial flutter, Cardiomyopathy, CHF (congestive heart failure), Drug-induced erectile dysfunction, ESRD due to hypertension, ESRD on dialysis, GERD (gastroesophageal  reflux disease), Hepatitis B, Hyperlipidemia, Hypertension, Nightmares, Obesity, DANIEL (obstructive sleep apnea) (11/12/2019), Secondary hyperparathyroidism of renal origin, Supraventricular tachycardia, Tachycardia, and Valvular regurgitation.    PSH:   has a past surgical history that includes ASD repair; Cardiac valuve replacement (02/08/2017); Radiofrequency ablation (03/13/2017); Cardiac catheterization; AV Graft Creation (Left, 03/2017); Colonoscopy (N/A, 8/27/2019); Esophagogastroduodenoscopy (N/A, 8/27/2019); Removal of graft (Left, 7/2/2020); Colonoscopy (N/A, 9/3/2020); Right heart catheterization (Right, 8/12/2021); and Insertion of inflatable penile prosthesis (N/A, 5/21/2024).    FamHx: family history includes Anesthesia problems in his paternal uncle; Cancer in his mother; Diabetes in his paternal aunt and paternal aunt; Heart attack in his father; Heart failure in his paternal grandmother; Hypertension in his paternal aunt; Leukemia in his mother and paternal aunt; No Known Problems in his brother, sister, sister, and sister; Stroke in his paternal aunt; Suicide in his paternal uncle; Valvular heart disease in his maternal aunt.    SocHx:  reports that he has never smoked. He has never used smokeless tobacco. He reports that he does not drink alcohol and does not use drugs.      Physical Exam:  Vitals:    08/01/24 1358   BP: 98/61   Pulse: 71           General: A&Ox3, no apparent distress, no deformities  Neck: No masses, normal thyroid  Lungs: normal inspiration, no use of accessory muscles  Heart: normal pulse, no arrhythmias  Abdomen: Soft, NT, ND  Skin: The skin is warm and dry. No jaundice.  Ext: No c/c/e.   : 8/1/24- pump is easily palpated today, myself and the patient were both able to inflate and deflate prosthesis appropriately.  Tips are positioned within the glans.  6/27/24- significant scar tissue around the pump, can pump the prosthesis would having difficulty palpating the deflate  button therefore did not want to cycle to completion, no signs of infection, the incision is well healed.  5/23- Test desc felicita, no abnormalities of epididymus. Normal penile and scrotal skin. Meatus normal.    Labs/Studies:   Cysto negative 7/20  CT stone protocol prosthesis in place with minimal seroma, normal 6/24  CT stone protocol normal 4/24  DARLENE bilateral complex renal cysts 2/22  CT urogram hyperdense cysts, no solid lesions 7/21  PSA 0.55 8/23    Impression/Plan:        1. Gross hematuria- no recurrent hematuria, no history of smoking.  Previous structural evaluation was negative by my partner.  Patient does not void.  PSA today and proceed as appropriate.    2. Renal cyst- appeared to be benign, no further workup warranted.    3. Erectile dysfunction-  IPP  5/21/24    Patient has been reinstructed today, the prosthesis does inflates and deflates appropriately.  Myself and the patient were both able to perform these duties.  He will continue to practice, re-evaluate in 6 months.  Call with any complaints prior to the next procedure.  Of note cialis 20 mg, viagra 100 mg and now TriMix were not sufficient.

## 2024-08-01 NOTE — PROGRESS NOTES
Patient's INR is slightly sub-therapeutic at 1.9. Patient reports he followed previous instructions. Patient reports no changes. Advised patient of increased risk of clotting; signs/symptoms of clotting and need to go to ED if he experiences any. Patient reports he is not having any signs/symptoms of clotting. Instructions given: Continue warfarin 5 mg every day. Recheck on 8/15/24 (per patient's request). Calendar reviewed with patient. Patient verbalizes understanding.

## 2024-08-02 RX ORDER — PANTOPRAZOLE SODIUM 40 MG/1
TABLET, DELAYED RELEASE ORAL
Qty: 90 TABLET | Refills: 3 | Status: SHIPPED | OUTPATIENT
Start: 2024-08-02

## 2024-08-12 ENCOUNTER — LAB VISIT (OUTPATIENT)
Dept: LAB | Facility: HOSPITAL | Age: 54
End: 2024-08-12
Attending: NURSE PRACTITIONER
Payer: MEDICARE

## 2024-08-12 DIAGNOSIS — D63.8 ANEMIA OF CHRONIC DISEASE: Primary | ICD-10-CM

## 2024-08-12 DIAGNOSIS — D63.8 ANEMIA OF CHRONIC DISEASE: ICD-10-CM

## 2024-08-12 LAB
ABO + RH BLD: NORMAL
BASOPHILS # BLD AUTO: 0.03 K/UL (ref 0–0.2)
BASOPHILS NFR BLD: 0.4 % (ref 0–1.9)
BLD GP AB SCN CELLS X3 SERPL QL: NORMAL
DIFFERENTIAL METHOD BLD: ABNORMAL
EOSINOPHIL # BLD AUTO: 0.9 K/UL (ref 0–0.5)
EOSINOPHIL NFR BLD: 13.1 % (ref 0–8)
ERYTHROCYTE [DISTWIDTH] IN BLOOD BY AUTOMATED COUNT: 18 % (ref 11.5–14.5)
HCT VFR BLD AUTO: 24.9 % (ref 40–54)
HGB BLD-MCNC: 8.4 G/DL (ref 14–18)
IMM GRANULOCYTES # BLD AUTO: 0.01 K/UL (ref 0–0.04)
IMM GRANULOCYTES NFR BLD AUTO: 0.1 % (ref 0–0.5)
LYMPHOCYTES # BLD AUTO: 1 K/UL (ref 1–4.8)
LYMPHOCYTES NFR BLD: 15.5 % (ref 18–48)
MCH RBC QN AUTO: 35.1 PG (ref 27–31)
MCHC RBC AUTO-ENTMCNC: 33.7 G/DL (ref 32–36)
MCV RBC AUTO: 104 FL (ref 82–98)
MONOCYTES # BLD AUTO: 0.7 K/UL (ref 0.3–1)
MONOCYTES NFR BLD: 10.7 % (ref 4–15)
NEUTROPHILS # BLD AUTO: 4 K/UL (ref 1.8–7.7)
NEUTROPHILS NFR BLD: 60.2 % (ref 38–73)
NRBC BLD-RTO: 0 /100 WBC
PLATELET # BLD AUTO: 160 K/UL (ref 150–450)
PMV BLD AUTO: 10 FL (ref 9.2–12.9)
RBC # BLD AUTO: 2.39 M/UL (ref 4.6–6.2)
SPECIMEN OUTDATE: NORMAL
WBC # BLD AUTO: 6.7 K/UL (ref 3.9–12.7)

## 2024-08-12 PROCEDURE — 86901 BLOOD TYPING SEROLOGIC RH(D): CPT | Performed by: NURSE PRACTITIONER

## 2024-08-12 PROCEDURE — 86900 BLOOD TYPING SEROLOGIC ABO: CPT | Performed by: NURSE PRACTITIONER

## 2024-08-12 PROCEDURE — 36415 COLL VENOUS BLD VENIPUNCTURE: CPT | Performed by: NURSE PRACTITIONER

## 2024-08-12 PROCEDURE — 85025 COMPLETE CBC W/AUTO DIFF WBC: CPT | Performed by: NURSE PRACTITIONER

## 2024-08-13 ENCOUNTER — INFUSION (OUTPATIENT)
Dept: INFUSION THERAPY | Facility: HOSPITAL | Age: 54
End: 2024-08-13
Attending: PEDIATRICS
Payer: MEDICARE

## 2024-08-13 ENCOUNTER — TELEPHONE (OUTPATIENT)
Dept: HEMATOLOGY/ONCOLOGY | Facility: CLINIC | Age: 54
End: 2024-08-13
Payer: MEDICARE

## 2024-08-13 VITALS
RESPIRATION RATE: 16 BRPM | SYSTOLIC BLOOD PRESSURE: 134 MMHG | OXYGEN SATURATION: 95 % | TEMPERATURE: 98 F | DIASTOLIC BLOOD PRESSURE: 64 MMHG | HEART RATE: 82 BPM

## 2024-08-13 DIAGNOSIS — D63.8 ANEMIA OF CHRONIC DISEASE: Primary | ICD-10-CM

## 2024-08-13 DIAGNOSIS — D63.1 ANEMIA ASSOCIATED WITH CHRONIC RENAL FAILURE: Primary | ICD-10-CM

## 2024-08-13 DIAGNOSIS — N18.9 ANEMIA ASSOCIATED WITH CHRONIC RENAL FAILURE: Primary | ICD-10-CM

## 2024-08-13 LAB
BLD PROD TYP BPU: NORMAL
BLOOD UNIT EXPIRATION DATE: NORMAL
BLOOD UNIT TYPE CODE: 5100
BLOOD UNIT TYPE: NORMAL
CODING SYSTEM: NORMAL
CROSSMATCH INTERPRETATION: NORMAL
DISPENSE STATUS: NORMAL
NUM UNITS TRANS PACKED RBC: NORMAL

## 2024-08-13 PROCEDURE — 36430 TRANSFUSION BLD/BLD COMPNT: CPT

## 2024-08-13 PROCEDURE — P9016 RBC LEUKOCYTES REDUCED: HCPCS | Performed by: NURSE PRACTITIONER

## 2024-08-13 PROCEDURE — 86920 COMPATIBILITY TEST SPIN: CPT | Performed by: NURSE PRACTITIONER

## 2024-08-13 RX ORDER — HYDROCODONE BITARTRATE AND ACETAMINOPHEN 500; 5 MG/1; MG/1
TABLET ORAL
Status: DISCONTINUED | OUTPATIENT
Start: 2024-08-13 | End: 2024-08-13 | Stop reason: HOSPADM

## 2024-08-13 NOTE — NURSING
"Pt came into infusion room upset that "nobody is listening to me" and "I'm getting conflicting stories from my doctors and my dialysis clinic nurses". Listened to pts concerns and pt worried that in May after a procedure he was told he'd lost 4 units of blood. During his hospital stay pt stated he received 2 units. When discharged pt stated he still felt weak, tired and no energy. Pt stated that [facility] administered a unit of blood as well. Pt feels as if he needed another unit to replace the 4 he lost regardless of his labs results-Hgb 8.4. Pt has refused recommendations of upper/lower scopes as well as hem/onc referral. Explained to pt hem/onc can do a vast array of labs to rule out many diagnosis and highly encouraged pt having a hem/onc provider visit. Pt agreed to appt on a T/Thurs due to his dialysis scheduled on M/W/F. Pt requesting an inperson visit due to this being a new pt appt. Next avail appt is virtual visit 8/23 pt declined. Next in person visit is 9/24 with Dr. Yanez. Pt agreeable to this day/time. Explained that iron studies will be needed prior to this appt and added to pts already scheduled labs on 9/5. Pt will have CBC drawn same day as his 9/24 visit with Dr. Yanez. Pt verbalized understanding to all this above.   "

## 2024-08-13 NOTE — PLAN OF CARE
Problem: Adult Inpatient Plan of Care  Goal: Plan of Care Review  Outcome: Progressing  Flowsheets (Taken 8/13/2024 0731)  Plan of Care Reviewed With: patient  Goal: Patient-Specific Goal (Individualized)  Outcome: Progressing  Flowsheets (Taken 8/13/2024 0731)  Individualized Care Needs: No care needs at this time  Anxieties, Fears or Concerns: Pt states he just doesn't feel well  Patient/Family-Specific Goals (Include Timeframe): Patient will tolerate blood transfusion and begin to feel well again     Problem: Anemia  Goal: Anemia Symptom Improvement  Outcome: Progressing

## 2024-08-13 NOTE — TELEPHONE ENCOUNTER
"Called pt offered sooner Hematology on 8/20 2:00 on [facility] pt declined states "no I want my appt at the [facility]"  "

## 2024-08-15 ENCOUNTER — ANTI-COAG VISIT (OUTPATIENT)
Dept: CARDIOLOGY | Facility: CLINIC | Age: 54
End: 2024-08-15
Payer: MEDICARE

## 2024-08-15 DIAGNOSIS — Z95.2 HISTORY OF MECHANICAL AORTIC VALVE REPLACEMENT: ICD-10-CM

## 2024-08-15 DIAGNOSIS — I48.91 ON COUMADIN FOR ATRIAL FIBRILLATION: ICD-10-CM

## 2024-08-15 DIAGNOSIS — Z79.01 ON COUMADIN FOR ATRIAL FIBRILLATION: ICD-10-CM

## 2024-08-15 DIAGNOSIS — Z79.01 LONG TERM (CURRENT) USE OF ANTICOAGULANTS: Primary | ICD-10-CM

## 2024-08-15 LAB
CTP QC/QA: YES
INR PPP: 1.4 (ref 2–3)

## 2024-08-15 PROCEDURE — 85610 PROTHROMBIN TIME: CPT | Mod: PBBFAC

## 2024-08-15 PROCEDURE — 99999PBSHW POCT INR: Mod: PBBFAC,,,

## 2024-08-15 NOTE — PROGRESS NOTES
LPN notes reviewed. INR not at goal 2/2 missed doses. Medications, chart, and patient findings reviewed. Will boost today and repeat INR next week. See calendar for adjustments to dose and follow up plan.

## 2024-08-15 NOTE — PROGRESS NOTES
Assessment/Plan:    Cough  Patient is here for evaluation of a cough  She states that she did have an upper respiratory tract infection from her grandchild  She states that she now has a persistent cough which most times is nonproductive and is very dry  No shortness of breath, fever chills   No shortness of breath  On evaluation patient does have some mild erythema to posterior airway in a slight whitish postnasal drip, lungs are clear  Patient was told to restart her Flonase nasal spray to start working at increasing hydration continue with over-the-counter cough medicine such as Mucinex DM and to restart using her humidifier in the bedroom  To call if not improving  There are no diagnoses linked to this encounter  Subjective:      Patient ID: Diego Khan is a 59 y o  female  Patient is a 50-year-old female with a history of medical problems as outlined previously  Patient is here today for evaluation of a persistent cough  States that she did have an upper respiratory tract infection recently and has slowly been improving but she still has persistent dry cough  She denies any fever or chills, shortness of breath, she states occasionally she will cough up a very small amount of mucus and that her throat and is very dry      The following portions of the patient's history were reviewed and updated as appropriate: She  has a past medical history of Hyperlipemia and Hypertension  She   Patient Active Problem List    Diagnosis Date Noted    Cough 2020    Left chest pressure 2019    Acute recurrent frontal sinusitis 2019    Localized, primary osteoarthritis of hand 2018    Hyperplastic polyp of ascending colon 2018    Healthcare maintenance 2018    Gastritis, acute 2017    Hyperlipidemia 2013    Hypertension 2012     She  has a past surgical history that includes Hysteroscopy w/ endometrial ablation and  section    Her Patient's INR is sub-therapeutic at 1.4. Patient reports he ran out of warfarin and was unable to pick it up from pharmacy until today. Advised of increased risk of clotting; signs/symptoms of clotting and need to go to ED if he experiences any. Patient report he is not having any signs/symptoms of clotting. Sent to PharmD for dosing/instructions.      family history includes Colon cancer (age of onset: 48) in her cousin; Lung cancer (age of onset: 48) in her cousin; Ovarian cancer (age of onset: 61) in her paternal aunt; Pancreatic cancer (age of onset: 66) in her paternal aunt  She  reports that she has never smoked  She has never used smokeless tobacco  She reports that she does not use drugs  Her alcohol history is not on file  Current Outpatient Medications   Medication Sig Dispense Refill    atorvastatin (LIPITOR) 10 mg tablet Take 1 tablet (10 mg total) by mouth every other day 90 tablet 3    lisinopril (ZESTRIL) 10 mg tablet TAKE 1 TABLET DAILY 90 tablet 3    ondansetron (ZOFRAN) 4 mg tablet Take 1 tablet (4 mg total) by mouth every 8 (eight) hours as needed for nausea or vomiting (Patient not taking: Reported on 9/9/2019) 10 tablet 1     No current facility-administered medications for this visit  Current Outpatient Medications on File Prior to Visit   Medication Sig    atorvastatin (LIPITOR) 10 mg tablet Take 1 tablet (10 mg total) by mouth every other day    lisinopril (ZESTRIL) 10 mg tablet TAKE 1 TABLET DAILY    ondansetron (ZOFRAN) 4 mg tablet Take 1 tablet (4 mg total) by mouth every 8 (eight) hours as needed for nausea or vomiting (Patient not taking: Reported on 9/9/2019)     No current facility-administered medications on file prior to visit  She is allergic to grass extracts [gramineae pollens]; mold extract [trichophyton]; and other       Review of Systems   Constitutional: Negative  HENT: Negative  Dry throat especially noted in the morning   Eyes: Negative  Respiratory: Positive for cough  Negative for apnea, choking, chest tightness, shortness of breath, wheezing and stridor  Cardiovascular: Negative  Gastrointestinal: Negative  Endocrine: Negative  Genitourinary: Negative  Musculoskeletal: Negative  Allergic/Immunologic: Negative  Neurological: Negative  Hematological: Negative  Psychiatric/Behavioral: Negative  Objective:      /82   Pulse 74   Temp 98 3 °F (36 8 °C)   Ht 5' 5 5" (1 664 m)   Wt 67 6 kg (149 lb)   SpO2 96%   BMI 24 42 kg/m²          Physical Exam   Constitutional: She is oriented to person, place, and time  She appears well-developed and well-nourished  No distress  Pleasant, healthy-appearing 64-year-old female who is awake alert no acute distress and oriented x3   HENT:   Head: Normocephalic and atraumatic  Right Ear: External ear normal    Left Ear: External ear normal    Nose: Nose normal    Mouth/Throat: Oropharyngeal exudate present  Patient does have a slight erythema to tonsillar pillars, posterior airway with a slight amount of a thick whitish postnasal drip  Eyes: Pupils are equal, round, and reactive to light  Conjunctivae and EOM are normal    Neck: Normal range of motion  Neck supple  Cardiovascular: Normal rate, regular rhythm and normal heart sounds  Pulmonary/Chest: Effort normal and breath sounds normal  No stridor  No respiratory distress  She has no wheezes  She has no rales  She exhibits no tenderness  Abdominal: Soft  Bowel sounds are normal    Musculoskeletal: Normal range of motion  Neurological: She is alert and oriented to person, place, and time  Skin: Skin is warm  Capillary refill takes less than 2 seconds  She is not diaphoretic  Psychiatric: She has a normal mood and affect  Her behavior is normal  Judgment and thought content normal    Nursing note and vitals reviewed

## 2024-08-18 NOTE — PROGRESS NOTES
Hepatology Note    PATIENT: Isidro White Jr.  MRN: [id number]  DATE: 8/20/2024    Provider: Hepatologist - Dr Moore  Urgency of review: non-urgent  Referring provider: No ref. provider found    Diagnosis:   1. Chronic viral hepatitis B without delta agent and without coma    2. Portal hypertension    3. Stage 5 chronic kidney disease on chronic dialysis    4. Iron deficiency anemia due to chronic blood loss    5. Cirrhosis of liver without ascites, unspecified hepatic cirrhosis type    6. ESRD (end stage renal disease) on dialysis    7. Chronic atrial fibrillation    8. Adult congenital heart disease    9. Erectile dysfunction, unspecified erectile dysfunction type    10. DANIEL (obstructive sleep apnea)    11. Gastroesophageal reflux disease, unspecified whether esophagitis present        Chief complaint:   Chief Complaint   Patient presents with    Cirrhosis       Subjective:    Initial History: Isidro White Jr. is a 54 y.o. male with PMH PAF, ASD closure at age 8, , HFpEF, s/p AVR with a 22 mm mechanical valve and TV annuloplasty (3/17), HTN, PHTN, Mitral stenosis, SVT s/p RFA (3/17) and has been on HD - MWF since Feb 8,2016 who was referred to Hepatology Clinic for consultation of HBV cirrhosis      08/20/2024   Patient is new to me and previously seen by Sandy Lane  Pt states that he has been feeling well without any decompensating events. He denies recent hematemesis, coffee ground emesis, melena, hematochezia, jaundice, confusion, LE edema, abdominal distension. Taking Viread without complaints.   Recent labs are stable. US without lesion or mass. He had a colonoscopy and upper endoscopy approximately 5 years ago, during which a polyp was found.     Patient was admitted on 6/24, underwent procedure of inflatable penile prosthesis. Had significant postop bleeding for which he was in ICU for 3 days. Per note The prosthesis button has become jammed due to scar tissue formation in the testicles, causing  malpositioning. The physician recommended attempting manual massage to break up scar tissue, but he expresses hesitancy about undergoing another surgery to reposition the prosthesis      Has been declined for kidney transplant due to cardiac history.     Dx CAD and valvular Hear disease--Followed by cardiology. On Anticoagulation    Prior Relevant History:    He  denies hepatotoxic medication    Review of systems:  A review of 12+ systems was conducted with pertinent positive and negative findings documented in HPI with all other systems reviewed and negative.      PFSH:  Past medical, family, and social history reviewed as documented in chart with pertinent positive medical, family, and social history detailed in HPI.    Past Medical History:   Past Medical History:   Diagnosis Date    Anemia     Aortic valve stenosis     s/p mechanical AVR    Atrial fibrillation     Atrial flutter     Cardiomyopathy     CHF (congestive heart failure)     Drug-induced erectile dysfunction     ESRD due to hypertension     HD M, W, F    ESRD on dialysis     GERD (gastroesophageal reflux disease)     Hepatitis B     Hyperlipidemia     Hypertension     Nightmares     Obesity     DANIEL (obstructive sleep apnea) 11/12/2019    Secondary hyperparathyroidism of renal origin     Supraventricular tachycardia     atrial tachycardia    Tachycardia     Valvular regurgitation     AI, TR       Past Surgical HIstory:   Past Surgical History:   Procedure Laterality Date    ASD REPAIR      age 8 [facility]    AV Graft Creation Left 03/2017    CARDIAC CATHETERIZATION      [facility]     CARDIAC VALVE SURGERY  02/08/2017    22 mm Medtronic AV, 28 mm TV Medtronic annuloplaty ring    COLONOSCOPY N/A 8/27/2019    Procedure: COLONOSCOPY;  Surgeon: Addie John MD;  Location: [facility];  Service: Endoscopy;  Laterality: N/A;  dialysis pt *needs K*    COLONOSCOPY N/A 9/3/2020    Procedure: COLONOSCOPY-need INR;  Surgeon: Jemma Youngblood MD;  Location:  [facility] ENDO;  Service: Endoscopy;  Laterality: N/A;    ESOPHAGOGASTRODUODENOSCOPY N/A 8/27/2019    Procedure: EGD (ESOPHAGOGASTRODUODENOSCOPY);  Surgeon: Addie John MD;  Location: [facility] ENDO;  Service: Endoscopy;  Laterality: N/A;    INSERTION OF INFLATABLE PENILE PROSTHESIS N/A 5/21/2024    Procedure: INSERTION, PENILE PROSTHESIS, INFLATABLE;  Surgeon: Bonifacio Shirley MD;  Location: [facility] OR;  Service: Urology;  Laterality: N/A;    RADIOFREQUENCY ABLATION  03/13/2017    Atrial tachycardia    REMOVAL OF GRAFT Left 7/2/2020    Procedure: REMOVAL, GRAFT;  Surgeon: Sascha Sidhu MD;  Location: [facility] OR;  Service: Peripheral Vascular;  Laterality: Left;    RIGHT HEART CATHETERIZATION Right 8/12/2021    Procedure: INSERTION, CATHETER, RIGHT HEART;  Surgeon: Mariah Pierce MD;  Location: [facility] CATH LAB;  Service: Cardiology;  Laterality: Right;       Family History:   Family History   Problem Relation Name Age of Onset    Leukemia Mother      Cancer Mother      Heart attack Father          massive MI at age 67    No Known Problems Sister      No Known Problems Brother      No Known Problems Sister      No Known Problems Sister      Heart failure Paternal Grandmother      Diabetes Paternal Aunt      Hypertension Paternal Aunt      Leukemia Paternal Aunt          CLL    Suicide Paternal Uncle      Anesthesia problems Paternal Uncle      Diabetes Paternal Aunt      Stroke Paternal Aunt      Valvular heart disease Maternal Aunt      Kidney disease Neg Hx      Colon cancer Neg Hx       He has no known family history of liver disease.     Social History:  reports that he has never smoked. He has never been exposed to tobacco smoke. He has never used smokeless tobacco. He reports current alcohol use of about 14.0 standard drinks of alcohol per week. He reports that he does not use drugs.    He has no history of Alcohol     He denies history of IV drug use/Tatto  He  denies high-risk sexual contacts, no raw seafood, no  sick contacts      Allergies:  Review of patient's allergies indicates:  No Known Allergies    Medications:  Current Outpatient Medications   Medication Sig Dispense Refill    albuterol (VENTOLIN HFA) 90 mcg/actuation inhaler Inhale 2 puffs into the lungs every 6 (six) hours as needed for Wheezing or Shortness of Breath. Rescue 18 g 0    ascorbic acid, vitamin C, (VITAMIN C) 500 MG tablet Take 1 tablet (500 mg total) by mouth 2 (two) times daily.      B complex-vitamin C-folic acid (ETHAN-RACQUEL) 0.8 mg Tab Take 1 tablet by mouth once daily 30 tablet 6    calcium acetate,phosphat bind, (PHOSLO) 667 mg capsule Take 3 capsules by mouth 3 times daily with meals and 1 capsule by mouth twice daily with snacks 330 capsule 11    diltiaZEM (TIADYLT ER) 420 MG Cs24 Take 1 capsule (420 mg total) by mouth once daily. 90 capsule 1    ferrous sulfate 324 mg (65 mg iron) TbEC Take 325 mg by mouth once daily.      hydrOXYzine HCL (ATARAX) 25 MG tablet take one tablet by mouth daily as needed for itching 90 tablet 3    labetaloL (NORMODYNE) 200 MG tablet Take 1 tablet by mouth three times daily (Patient taking differently: Take 600 mg by mouth once.) 90 tablet 6    omega-3 fatty acids-vitamin E (FISH OIL) 1,000 mg Cap Take 1 capsule by mouth once daily.  0    pantoprazole (PROTONIX) 40 MG tablet Take 1 tablet by mouth once daily 90 tablet 3    rosuvastatin (CRESTOR) 10 MG tablet Take 1 tablet (10 mg total) by mouth once daily. 90 tablet 3    tenofovir disoproxil fumarate (VIREAD) 300 mg Tab TAKE 1 TABLET BY MOUTH ONCE WEEKLY 4 tablet 2    warfarin (COUMADIN) 5 MG tablet Takes one tablet by mouth daily OR AS DIRECTED BY COUMADIN CLINIC 30 tablet 6    calcium acetate,phosphat bind, (PHOSLO) 667 mg tablet Take 3 capsules by mouth three times daily with meals AND take 1 capsule by mouth twice daily with snacks 330 tablet 12    labetaloL (NORMODYNE) 200 MG tablet Take 1 tablet (200 mg total) by mouth 3 (three) times daily. 90 tablet 6     multivitamin with minerals tablet Take 1 tablet by mouth once daily. (Patient not taking: Reported on 8/20/2024)      oxyCODONE-acetaminophen (PERCOCET) 5-325 mg per tablet Take 1 tablet by mouth every 4 (four) hours as needed for Pain. (Patient not taking: Reported on 8/20/2024) 20 tablet 0    pantoprazole (PROTONIX) 40 MG tablet Take 1 tablet by mouth once daily 30 tablet 3    tenofovir disoproxil fumarate (VIREAD) 300 mg Tab Take 1 tablet (300 mg total) by mouth once a week. 12 tablet 0    vitamin D (VITAMIN D3) 1000 units Tab Take 1 tablet by mouth daily (Patient not taking: Reported on 8/20/2024) 30 tablet 6    warfarin (COUMADIN) 7.5 MG tablet Take 2 tablets by mouth on Tuesday, Thursdays, and Saturday; and 1 tablet on all other days of the week or as directed by coumadin clinic. (Patient not taking: Reported on 8/1/2024) 45 tablet 6    warfarin (COUMADIN) 7.5 MG tablet Take 1 tablet (7.5 mg) on Mondays, Wednesdays and Fridays; and 1 and 1/2 tablets (11.25 mg) all other days OR AS DIRECTED BY COUMADIN CLINIC (Patient not taking: Reported on 8/1/2024) 45 tablet 6     No current facility-administered medications for this visit.     Facility-Administered Medications Ordered in Other Visits   Medication Dose Route Frequency Provider Last Rate Last Admin    0.9%  NaCl infusion   Intravenous Continuous Christopher Jane MD        sodium chloride 0.9% flush 10 mL  10 mL Intravenous PRN Christopher Jane MD           Review of Systems   Constitutional:  Positive for fatigue. Negative for fever and unexpected weight change.   HENT:  Negative for ear pain, nosebleeds and trouble swallowing.    Eyes:  Negative for discharge and redness.   Respiratory:  Negative for cough and shortness of breath.    Cardiovascular:  Negative for palpitations and leg swelling.   Gastrointestinal:  Negative for abdominal distention, abdominal pain, diarrhea and vomiting.   Endocrine: Negative for cold intolerance and polyuria.   Genitourinary:   "Negative for flank pain and hematuria.   Musculoskeletal:  Negative for back pain.   Skin:  Negative for pallor.   Neurological:  Negative for seizures and headaches.   Hematological:  Bruises/bleeds easily.   Psychiatric/Behavioral:  Negative for confusion and hallucinations.        MELD 3.0: 25 at 5/26/2024  5:14 AM  MELD-Na: 29 at 5/26/2024  5:14 AM  Calculated from:  Serum Creatinine: On dialysis. Using the maximum value.  Serum Sodium: 137 mmol/L at 5/26/2024  5:14 AM  Total Bilirubin: 0.5 mg/dL (Using min of 1 mg/dL) at 5/26/2024  5:14 AM  Serum Albumin: 2.7 g/dL at 5/26/2024  5:14 AM  INR(ratio): 2.2 at 5/26/2024  5:14 AM  Age at listing (hypothetical): 54 years  Sex: Male at 5/26/2024  5:14 AM       Objective:      Vitals:   Vitals:    08/20/24 1005   BP: 136/74   BP Location: Right arm   Patient Position: Sitting   BP Method: Medium (Manual)   Pulse: 85   Weight: 88.9 kg (195 lb 15.8 oz)   Height: 6' 2" (1.88 m)       Physical Exam  Constitutional:       Appearance: Normal appearance.   HENT:      Head: Normocephalic and atraumatic.      Right Ear: Tympanic membrane and external ear normal.      Left Ear: Tympanic membrane and external ear normal.      Mouth/Throat:      Mouth: Mucous membranes are moist.   Eyes:      Extraocular Movements: Extraocular movements intact.      Pupils: Pupils are equal, round, and reactive to light.   Cardiovascular:      Rate and Rhythm: Normal rate and regular rhythm.      Pulses: Normal pulses.      Heart sounds: Murmur heard.   Pulmonary:      Effort: Pulmonary effort is normal.      Breath sounds: Normal breath sounds.   Abdominal:      General: Bowel sounds are normal. There is no distension.      Palpations: Abdomen is soft. There is no mass.      Tenderness: There is no abdominal tenderness.   Musculoskeletal:         General: No swelling or deformity. Normal range of motion.      Cervical back: Normal range of motion and neck supple.   Skin:     Coloration: Skin is " "not jaundiced.   Neurological:      General: No focal deficit present.      Mental Status: He is alert and oriented to person, place, and time.      Cranial Nerves: No cranial nerve deficit.   Psychiatric:         Mood and Affect: Mood normal.         Behavior: Behavior normal.         Laboratory Data:  Anti-coag visit on 08/15/2024   Component Date Value Ref Range Status    INR 08/15/2024 1.4 (A)  2.0 - 3.0 Final     Acceptable 08/15/2024 Yes   Final       Lab Results   Component Value Date    INR 1.4 (A) 08/15/2024    INR 1.9 (A) 08/01/2024    INR 2.0 07/23/2024       No results found for: "SMOOTHMUSCAB", "MITOAB"  Lab Results   Component Value Date    IRON 30 (L) 03/10/2017    TIBC 161 (L) 03/10/2017    FERRITIN 1,904 (H) 07/02/2024     Lab Results   Component Value Date    HEPAIGM Non-reactive 05/20/2024    HEPBIGM Non-reactive 05/20/2024    HEPBCAB Positive (A) 11/05/2020    HBEAG POS (A) 02/05/2019    HEPCAB Non-reactive 05/20/2024     No results found for: "TSH"  No results found for: "SALIMA"    No results found for: "ABORH"        Lab Results   Component Value Date    HGBA1C 4.9 03/12/2024     Lab Results   Component Value Date    CHOL 248 (H) 03/12/2024    CHOL 248 (H) 11/05/2020    CHOL 213 (H) 01/08/2019     Lab Results   Component Value Date    HDL 29 (L) 03/12/2024    HDL 42 11/05/2020    HDL 50 01/08/2019     Lab Results   Component Value Date    LDLCALC 170.0 (H) 03/12/2024    LDLCALC 164.8 (H) 11/05/2020    LDLCALC 142.4 01/08/2019     Lab Results   Component Value Date    TRIG 245 (H) 03/12/2024    TRIG 206 (H) 11/05/2020    TRIG 103 01/08/2019     Lab Results   Component Value Date    CHOLHDL 11.7 (L) 03/12/2024    CHOLHDL 16.9 (L) 11/05/2020    CHOLHDL 23.5 01/08/2019         I personally reviewed imaging studies and outside records..      Assessment:       1. Chronic viral hepatitis B without delta agent and without coma    2. Portal hypertension    3. Stage 5 chronic kidney disease " on chronic dialysis    4. Iron deficiency anemia due to chronic blood loss    5. Cirrhosis of liver without ascites, unspecified hepatic cirrhosis type    6. ESRD (end stage renal disease) on dialysis    7. Chronic atrial fibrillation    8. Adult congenital heart disease    9. Erectile dysfunction, unspecified erectile dysfunction type    10. DANIEL (obstructive sleep apnea)    11. Gastroesophageal reflux disease, unspecified whether esophagitis present                 Plan:     Problem List Items Addressed This Visit          Cardiac/Vascular    Adult congenital heart disease    Atrial fibrillation       Renal/    ESRD (end stage renal disease) on dialysis (Chronic)    Erectile dysfunction       GI    GERD (gastroesophageal reflux disease)    Hepatic cirrhosis       Other    DANIEL (obstructive sleep apnea)     Other Visit Diagnoses       Chronic viral hepatitis B without delta agent and without coma    -  Primary    Portal hypertension        Stage 5 chronic kidney disease on chronic dialysis        Iron deficiency anemia due to chronic blood loss                Isidro was seen today for cirrhosis.    Diagnoses and all orders for this visit:    Chronic viral hepatitis B without delta agent and without coma    Portal hypertension    Stage 5 chronic kidney disease on chronic dialysis    Iron deficiency anemia due to chronic blood loss    Cirrhosis of liver without ascites, unspecified hepatic cirrhosis type    ESRD (end stage renal disease) on dialysis    Chronic atrial fibrillation    Adult congenital heart disease    Erectile dysfunction, unspecified erectile dysfunction type    DANIEL (obstructive sleep apnea)    Gastroesophageal reflux disease, unspecified whether esophagitis present          Cirrhosis  Hepatitis B related  Well compensated clinically  High MELD but driven by Creatinine  HCC surveillance with US/AFP every 6 month  Variceal surveillance--Plan EGD with anticoagulation brifge    HBV  On TAF  Check HBV  DNA    Colon Polyps  Plan with EGD during anticoagulation bridge later as  hematology when necessary  No Varices on EGD last    HLD  - cont statin    GERD  On PPI      Pulm HTN/ DANIEL  - PASP 56 mmHg  -Mx as per cardiology    CKD  On HD  Follows Nephrology  Not a renal transplant candidate considering cardiac issue    CAD  Follows cardiology    Valvular Heart Disease  S/p mech AVR, with mild to moderate MS, mild to mod TR, pulm HTN   - cont coumadin  PVCs, SVT s/p RFA       Chronic Diastolic HF  - cont low salt diet  - cont HD for volume removal      Erectile dysfunction   S/p Prosthesis  Urology follow up      Anemia  Plan as per nephrology and Hematology    Return to clinic in 6 months.    I have sent communication to the referring physician and/or primary care provider.    Visit today is associated with current and anticipated ongoing medical care related this patients complex condition   cirrhosis    Patient will follow up with the office for continued care for his complex medical condition        Time Statement  A total time spent includes time preparing to see patient, reviewing  diagnostic studies and records, direct face-to-face visit, completing orders, medications , reconciliation, prescription management, and care coordination    We discussed in depth the nature of the patient's disease, the management plan in details. I have provided the patient with an opportunity to ask questions and have all questions answered to his satisfaction.     Discussed with patient that it is likely that she will see results before Myself or my nurse are able to view them and report results due to the Cures Act passed 4/1/21. Results will be sent immediately to the patient who are enrolled in the patient portal. If results come through after business hours or on weekend, we will not see them until the next business day that we are in the office. If resulted during the business day, we will likely not be able to review them  until after completing all patient visits in office that day.     Justen Moore MD  Transplant Hepatologist and Gastroenterologist  [facility]

## 2024-08-20 ENCOUNTER — OFFICE VISIT (OUTPATIENT)
Dept: HEPATOLOGY | Facility: CLINIC | Age: 54
End: 2024-08-20
Payer: MEDICARE

## 2024-08-20 ENCOUNTER — LAB VISIT (OUTPATIENT)
Dept: LAB | Facility: HOSPITAL | Age: 54
End: 2024-08-20
Attending: INTERNAL MEDICINE
Payer: MEDICARE

## 2024-08-20 VITALS
SYSTOLIC BLOOD PRESSURE: 136 MMHG | HEART RATE: 85 BPM | DIASTOLIC BLOOD PRESSURE: 74 MMHG | HEIGHT: 74 IN | BODY MASS INDEX: 25.15 KG/M2 | WEIGHT: 196 LBS

## 2024-08-20 DIAGNOSIS — N52.9 ERECTILE DYSFUNCTION, UNSPECIFIED ERECTILE DYSFUNCTION TYPE: ICD-10-CM

## 2024-08-20 DIAGNOSIS — Z99.2 ESRD (END STAGE RENAL DISEASE) ON DIALYSIS: Chronic | ICD-10-CM

## 2024-08-20 DIAGNOSIS — Q24.9 ADULT CONGENITAL HEART DISEASE: ICD-10-CM

## 2024-08-20 DIAGNOSIS — K76.6 PORTAL HYPERTENSION: ICD-10-CM

## 2024-08-20 DIAGNOSIS — R93.1 ABNORMAL FINDINGS ON DIAGNOSTIC IMAGING OF HEART AND CORONARY CIRCULATION: ICD-10-CM

## 2024-08-20 DIAGNOSIS — K74.60 CIRRHOSIS OF LIVER WITHOUT ASCITES, UNSPECIFIED HEPATIC CIRRHOSIS TYPE: ICD-10-CM

## 2024-08-20 DIAGNOSIS — I48.20 CHRONIC ATRIAL FIBRILLATION: ICD-10-CM

## 2024-08-20 DIAGNOSIS — D50.0 IRON DEFICIENCY ANEMIA DUE TO CHRONIC BLOOD LOSS: ICD-10-CM

## 2024-08-20 DIAGNOSIS — B18.1 CHRONIC VIRAL HEPATITIS B WITHOUT DELTA AGENT AND WITHOUT COMA: Primary | ICD-10-CM

## 2024-08-20 DIAGNOSIS — K21.9 GASTROESOPHAGEAL REFLUX DISEASE, UNSPECIFIED WHETHER ESOPHAGITIS PRESENT: ICD-10-CM

## 2024-08-20 DIAGNOSIS — B18.1 CHRONIC VIRAL HEPATITIS B WITHOUT DELTA AGENT AND WITHOUT COMA: ICD-10-CM

## 2024-08-20 DIAGNOSIS — N18.6 STAGE 5 CHRONIC KIDNEY DISEASE ON CHRONIC DIALYSIS: ICD-10-CM

## 2024-08-20 DIAGNOSIS — N18.6 ESRD (END STAGE RENAL DISEASE) ON DIALYSIS: Chronic | ICD-10-CM

## 2024-08-20 DIAGNOSIS — Z99.2 STAGE 5 CHRONIC KIDNEY DISEASE ON CHRONIC DIALYSIS: ICD-10-CM

## 2024-08-20 DIAGNOSIS — R94.5 ABNORMAL RESULTS OF LIVER FUNCTION STUDIES: ICD-10-CM

## 2024-08-20 DIAGNOSIS — G47.33 OSA (OBSTRUCTIVE SLEEP APNEA): ICD-10-CM

## 2024-08-20 PROBLEM — E43 SEVERE PROTEIN-CALORIE MALNUTRITION: Status: RESOLVED | Noted: 2017-03-09 | Resolved: 2024-08-20

## 2024-08-20 PROCEDURE — 99999 PR PBB SHADOW E&M-EST. PATIENT-LVL V: CPT | Mod: PBBFAC,,, | Performed by: INTERNAL MEDICINE

## 2024-08-20 PROCEDURE — 85610 PROTHROMBIN TIME: CPT | Performed by: INTERNAL MEDICINE

## 2024-08-20 PROCEDURE — 87341 HEP B SURFACE AG NEUTRLZJ IA: CPT | Performed by: INTERNAL MEDICINE

## 2024-08-20 PROCEDURE — 99215 OFFICE O/P EST HI 40 MIN: CPT | Mod: PBBFAC | Performed by: INTERNAL MEDICINE

## 2024-08-20 PROCEDURE — 36415 COLL VENOUS BLD VENIPUNCTURE: CPT | Performed by: INTERNAL MEDICINE

## 2024-08-20 PROCEDURE — 85025 COMPLETE CBC W/AUTO DIFF WBC: CPT | Performed by: INTERNAL MEDICINE

## 2024-08-20 PROCEDURE — 80053 COMPREHEN METABOLIC PANEL: CPT | Performed by: INTERNAL MEDICINE

## 2024-08-20 PROCEDURE — 87340 HEPATITIS B SURFACE AG IA: CPT | Performed by: INTERNAL MEDICINE

## 2024-08-20 PROCEDURE — 82105 ALPHA-FETOPROTEIN SERUM: CPT | Performed by: INTERNAL MEDICINE

## 2024-08-20 PROCEDURE — 87517 HEPATITIS B DNA QUANT: CPT | Performed by: INTERNAL MEDICINE

## 2024-08-20 NOTE — PATIENT INSTRUCTIONS
Because you have cirrhosis, it is important to attend regular clinic visits  with an Ultrasound and blood tests every 6 months to screen for liver cancer (you are at risk of developing liver cancer due to scar tissue in the liver)     Signs and symptoms of worsening liver disease include jaundice, fluid in the belly (ascites), and confusion/disorientation/slowed thought processes due to hepatic encephalopathy (toxins building up because of liver problems).   You should seek medical attention if any of these things occur.    Also, possible bleeding from esophageal varices (blood vessels in the stomach and foodpipe can burst and cause fatal bleeding).  Therefore, if you have symptoms of vomiting blood, blood in your stool, dark or black stools or vomiting coffee ground vomit, YOU SHOULD GO TO THE EMERGENCY ROOM IMMEDIATELY.          Cirrhosis Counseling  - strict abstinence of alcohol use  - compliance with lactulose and rifaximin if you have developed hepatic encephalopathy (confusion due to high ammonia level)  - avoid non-steroidal anti-inflammatory drugs (NSAIDs) such as ibuprofen, Motrin, naprosyn, Alleve due to the risk of kidney damage  - can take acetaminophen (Tylenol), no more than 2000 mg per day  - low sodium (salt) 2 gram per day diet  - nutrition: 25-30kcal (calorie per body body weight in kilogram) per day  - no need to restrict protein in diet  - high protein diet: 1.2-1.5 gram/kg (protein per body weight in kilogram) per day to prevent muscle mass loss  - avoid fasting  -I recommend you do NOT drive a car or operate machinery currently considering risk of Hepatic encephalopathy  - Late night snack with 50 gram of complex carbohydrates and protein  - resistance exercises for muscle strength  - avoid raw seafoods due to the risk of fatal Vibrio vulnificus infection  - ultrasound or imaging of the liver every 6 months for liver cancer screening (you are at risk of developing liver cancer due to scar  tissue in the liver)  - Upper endoscopy every 1-2 years to screen for varices in the stomach and foodpipe which can burst and cause fatal bleeding      Thanks for trusting us with your healthcare needs and using [facility]. If you want to ask us a question, you can do so by replying to this message or by calling [phone].

## 2024-08-21 ENCOUNTER — TELEPHONE (OUTPATIENT)
Dept: HEPATOLOGY | Facility: CLINIC | Age: 54
End: 2024-08-21
Payer: MEDICARE

## 2024-08-21 LAB
AFP SERPL-MCNC: 3.3 NG/ML (ref 0–8.4)
ALBUMIN SERPL BCP-MCNC: 3.3 G/DL (ref 3.5–5.2)
ALP SERPL-CCNC: 75 U/L (ref 55–135)
ALT SERPL W/O P-5'-P-CCNC: 14 U/L (ref 10–44)
ANION GAP SERPL CALC-SCNC: 18 MMOL/L (ref 8–16)
AST SERPL-CCNC: 16 U/L (ref 10–40)
BASOPHILS # BLD AUTO: 0.02 K/UL (ref 0–0.2)
BASOPHILS NFR BLD: 0.3 % (ref 0–1.9)
BILIRUB SERPL-MCNC: 0.6 MG/DL (ref 0.1–1)
BUN SERPL-MCNC: 64 MG/DL (ref 6–20)
CALCIUM SERPL-MCNC: 10.6 MG/DL (ref 8.7–10.5)
CHLORIDE SERPL-SCNC: 98 MMOL/L (ref 95–110)
CO2 SERPL-SCNC: 26 MMOL/L (ref 23–29)
CREAT SERPL-MCNC: 10 MG/DL (ref 0.5–1.4)
DIFFERENTIAL METHOD BLD: ABNORMAL
EOSINOPHIL # BLD AUTO: 0.6 K/UL (ref 0–0.5)
EOSINOPHIL NFR BLD: 8.7 % (ref 0–8)
ERYTHROCYTE [DISTWIDTH] IN BLOOD BY AUTOMATED COUNT: 19.4 % (ref 11.5–14.5)
EST. GFR  (NO RACE VARIABLE): 5.7 ML/MIN/1.73 M^2
GLUCOSE SERPL-MCNC: 80 MG/DL (ref 70–110)
HBV SURFACE AG SERPL QL IA: REACTIVE
HBV SURFACE AG SERPL QL NT: ABNORMAL
HCT VFR BLD AUTO: 31.5 % (ref 40–54)
HEPATITIS B VIRUS DNA: NORMAL
HEPATITIS B VIRUS PCR, QUANT: NOT DETECTED IU/ML
HGB BLD-MCNC: 9.4 G/DL (ref 14–18)
IMM GRANULOCYTES # BLD AUTO: 0.01 K/UL (ref 0–0.04)
IMM GRANULOCYTES NFR BLD AUTO: 0.1 % (ref 0–0.5)
INR PPP: 1.4 (ref 0.8–1.2)
LYMPHOCYTES # BLD AUTO: 0.9 K/UL (ref 1–4.8)
LYMPHOCYTES NFR BLD: 12.7 % (ref 18–48)
MCH RBC QN AUTO: 33.7 PG (ref 27–31)
MCHC RBC AUTO-ENTMCNC: 29.8 G/DL (ref 32–36)
MCV RBC AUTO: 113 FL (ref 82–98)
MONOCYTES # BLD AUTO: 0.7 K/UL (ref 0.3–1)
MONOCYTES NFR BLD: 9.7 % (ref 4–15)
NEUTROPHILS # BLD AUTO: 5 K/UL (ref 1.8–7.7)
NEUTROPHILS NFR BLD: 68.5 % (ref 38–73)
NRBC BLD-RTO: 0 /100 WBC
PLATELET # BLD AUTO: 134 K/UL (ref 150–450)
PMV BLD AUTO: 11.7 FL (ref 9.2–12.9)
POTASSIUM SERPL-SCNC: 4 MMOL/L (ref 3.5–5.1)
PROT SERPL-MCNC: 7.9 G/DL (ref 6–8.4)
PROTHROMBIN TIME: 15.2 SEC (ref 9–12.5)
RBC # BLD AUTO: 2.79 M/UL (ref 4.6–6.2)
SODIUM SERPL-SCNC: 142 MMOL/L (ref 136–145)
WBC # BLD AUTO: 7.32 K/UL (ref 3.9–12.7)

## 2024-08-21 NOTE — TELEPHONE ENCOUNTER
Called pt back about lab results.  Results have not been reviewed by Dr. Moore as of yet.  If pt calls back please inform someone will reach out from office when results are reviewed.  Also find a good contact number due to the phone when to voicemail and the mailbox is full.

## 2024-08-21 NOTE — TELEPHONE ENCOUNTER
----- Message from May Dayo sent at 8/21/2024 11:51 AM CDT -----  Contact: Isidro  Type:  Test Results    Who Called: Isidro   Name of Test (Lab/Mammo/Etc):  NON FASTING LAB [[id number]]  Date of Test: 8/20  Ordering Provider:  Justen Moore MD   Where the test was performed: [facility]  Would the patient rather a call back or a response via [facility]? Call back  Best Call Back Number: [phone]   Additional Information:

## 2024-08-22 ENCOUNTER — ANTI-COAG VISIT (OUTPATIENT)
Dept: CARDIOLOGY | Facility: CLINIC | Age: 54
End: 2024-08-22
Payer: MEDICARE

## 2024-08-22 ENCOUNTER — HOSPITAL ENCOUNTER (OUTPATIENT)
Dept: RADIOLOGY | Facility: HOSPITAL | Age: 54
Discharge: HOME | End: 2024-08-22
Attending: INTERNAL MEDICINE
Payer: MEDICARE

## 2024-08-22 DIAGNOSIS — Z79.01 ON COUMADIN FOR ATRIAL FIBRILLATION: ICD-10-CM

## 2024-08-22 DIAGNOSIS — K76.6 PORTAL HYPERTENSION: ICD-10-CM

## 2024-08-22 DIAGNOSIS — I48.91 ON COUMADIN FOR ATRIAL FIBRILLATION: ICD-10-CM

## 2024-08-22 DIAGNOSIS — Z79.01 LONG TERM (CURRENT) USE OF ANTICOAGULANTS: Primary | ICD-10-CM

## 2024-08-22 DIAGNOSIS — Z95.2 HISTORY OF MECHANICAL AORTIC VALVE REPLACEMENT: ICD-10-CM

## 2024-08-22 LAB
CTP QC/QA: YES
INR PPP: 1.7 (ref 2–3)

## 2024-08-22 PROCEDURE — 85610 PROTHROMBIN TIME: CPT | Mod: PBBFAC

## 2024-08-22 PROCEDURE — 76705 ECHO EXAM OF ABDOMEN: CPT | Mod: 26,,, | Performed by: STUDENT IN AN ORGANIZED HEALTH CARE EDUCATION/TRAINING PROGRAM

## 2024-08-22 PROCEDURE — 99999PBSHW POCT INR: Mod: PBBFAC,,,

## 2024-08-22 PROCEDURE — 76705 ECHO EXAM OF ABDOMEN: CPT | Mod: TC

## 2024-08-22 NOTE — PROGRESS NOTES
Patient's INR is sub-therapeutic at 1.7. Patient reports he followed previous instructions. Patient reports he ate several pieces of pecan pie. Patient reports no other changes. Advised patient of increased risk of clotting; signs/symptoms of clotting and need to go to ED if he experiences any. Patient reports he is not having any signs/symptoms of clotting. Instructions given: Will increase overall maintenance dose to warfarin 7.5 mg on Thursdays; and 5 mg all other days. Recheck on 9/5/24 with other appointment. Calendar reviewed with patient. Patient verbalizes understanding.

## 2024-08-22 NOTE — PROGRESS NOTES
INR not at goal. Medications, chart, and patient findings reviewed. See calendar for adjustments to dose and follow up plan.  INR is iprvoing, agree with dose increase.

## 2024-08-23 LAB
ACARBOXYPROTHROMBIN SERPL-MCNC: 53 NG/ML
AFP L3 MFR SERPL: <0.5 %
AFP SERPL-MCNC: 2.1 NG/ML
IMMUNOLOGIST REVIEW: NORMAL

## 2024-08-26 DIAGNOSIS — I47.10 SVT (SUPRAVENTRICULAR TACHYCARDIA): ICD-10-CM

## 2024-08-26 RX ORDER — DILTIAZEM HYDROCHLORIDE 420 MG/1
420 CAPSULE, EXTENDED RELEASE ORAL DAILY
Qty: 90 CAPSULE | Refills: 1 | Status: SHIPPED | OUTPATIENT
Start: 2024-08-26 | End: 2025-08-26

## 2024-09-10 ENCOUNTER — TELEPHONE (OUTPATIENT)
Dept: INTERNAL MEDICINE | Facility: CLINIC | Age: 54
End: 2024-09-10
Payer: MEDICARE

## 2024-09-19 ENCOUNTER — ANTI-COAG VISIT (OUTPATIENT)
Dept: CARDIOLOGY | Facility: CLINIC | Age: 54
End: 2024-09-19
Payer: MEDICARE

## 2024-09-19 DIAGNOSIS — Z79.01 ON COUMADIN FOR ATRIAL FIBRILLATION: ICD-10-CM

## 2024-09-19 DIAGNOSIS — I48.91 ON COUMADIN FOR ATRIAL FIBRILLATION: ICD-10-CM

## 2024-09-19 DIAGNOSIS — Z95.2 HISTORY OF MECHANICAL AORTIC VALVE REPLACEMENT: ICD-10-CM

## 2024-09-19 DIAGNOSIS — I48.91 ATRIAL FIBRILLATION, UNSPECIFIED TYPE: ICD-10-CM

## 2024-09-19 DIAGNOSIS — Z95.2 H/O MECHANICAL AORTIC VALVE REPLACEMENT: ICD-10-CM

## 2024-09-19 DIAGNOSIS — Z79.01 LONG TERM (CURRENT) USE OF ANTICOAGULANTS: Primary | ICD-10-CM

## 2024-09-19 LAB
CTP QC/QA: YES
INR PPP: 1.6 (ref 2–3)

## 2024-09-19 PROCEDURE — 99999PBSHW POCT INR: Mod: PBBFAC,,,

## 2024-09-19 PROCEDURE — 85610 PROTHROMBIN TIME: CPT | Mod: PBBFAC

## 2024-09-19 NOTE — PROGRESS NOTES
Chart reviewed, agree with LPN plan.  Pt with missed doses and follow up.  Agree with boost and close follow up.

## 2024-09-19 NOTE — PROGRESS NOTES
Patient's INr is sub-therapeutic at 1.6. Patient reports he missed a dose because he was unable to  prescription before today. Patient reports he has picked up warfarin from pharmacy. Advised patient of increased risk clotting; signs/symptoms of clotting and need to go to ED if he experiences any. Patient reports he is not having any signs/symptoms of clotting. Instructions given: Will give boosted dose of warfarin 11.25 mg today 9/19/24; then resume warfarin 7.5 mg on Thursdays; and 5 mg all other days. Recheck on 9/24/24 with other appointment. Calendar reviewed with patient. Patient verbalizes understanding.

## 2024-09-20 ENCOUNTER — TELEPHONE (OUTPATIENT)
Dept: UROLOGY | Facility: CLINIC | Age: 54
End: 2024-09-20
Payer: MEDICARE

## 2024-09-20 NOTE — TELEPHONE ENCOUNTER
Reurned pt call there was no answer       ----- Message from Sophie Multani sent at 9/20/2024  9:25 AM CDT -----  Type:  Sooner Apoointment Request    Caller is requesting a sooner appointment.  Caller declined first available appointment listed below.  Caller will not accept being placed on the waitlist and is requesting a message be sent to doctor.  Name of Caller: Pt  When is the first available appointment?   Symptoms:   Would the patient rather a call back or a response via [facility]?    Best Call Back Number: [phone]  Additional Information:  having problems wouldn't say

## 2024-09-24 ENCOUNTER — ANTI-COAG VISIT (OUTPATIENT)
Dept: CARDIOLOGY | Facility: CLINIC | Age: 54
End: 2024-09-24
Payer: MEDICARE

## 2024-09-24 ENCOUNTER — LAB VISIT (OUTPATIENT)
Dept: LAB | Facility: HOSPITAL | Age: 54
End: 2024-09-24
Attending: INTERNAL MEDICINE
Payer: MEDICARE

## 2024-09-24 ENCOUNTER — OFFICE VISIT (OUTPATIENT)
Dept: HEMATOLOGY/ONCOLOGY | Facility: CLINIC | Age: 54
End: 2024-09-24
Payer: MEDICARE

## 2024-09-24 VITALS
WEIGHT: 198.88 LBS | TEMPERATURE: 99 F | SYSTOLIC BLOOD PRESSURE: 120 MMHG | BODY MASS INDEX: 25.52 KG/M2 | HEIGHT: 74 IN | HEART RATE: 84 BPM | OXYGEN SATURATION: 97 % | DIASTOLIC BLOOD PRESSURE: 69 MMHG

## 2024-09-24 DIAGNOSIS — D63.1 ANEMIA ASSOCIATED WITH CHRONIC RENAL FAILURE: Primary | ICD-10-CM

## 2024-09-24 DIAGNOSIS — Z79.01 ON COUMADIN FOR ATRIAL FIBRILLATION: ICD-10-CM

## 2024-09-24 DIAGNOSIS — D63.1 ANEMIA ASSOCIATED WITH CHRONIC RENAL FAILURE: ICD-10-CM

## 2024-09-24 DIAGNOSIS — Z95.2 H/O MECHANICAL AORTIC VALVE REPLACEMENT: ICD-10-CM

## 2024-09-24 DIAGNOSIS — D69.6 THROMBOCYTOPENIA: ICD-10-CM

## 2024-09-24 DIAGNOSIS — I48.91 ON COUMADIN FOR ATRIAL FIBRILLATION: ICD-10-CM

## 2024-09-24 DIAGNOSIS — I48.91 ATRIAL FIBRILLATION, UNSPECIFIED TYPE: ICD-10-CM

## 2024-09-24 DIAGNOSIS — D51.8 OTHER VITAMIN B12 DEFICIENCY ANEMIAS: ICD-10-CM

## 2024-09-24 DIAGNOSIS — Z79.01 ON COUMADIN FOR ATRIAL FIBRILLATION: Primary | ICD-10-CM

## 2024-09-24 DIAGNOSIS — Z95.2 HISTORY OF MECHANICAL AORTIC VALVE REPLACEMENT: ICD-10-CM

## 2024-09-24 DIAGNOSIS — N18.9 ANEMIA ASSOCIATED WITH CHRONIC RENAL FAILURE: ICD-10-CM

## 2024-09-24 DIAGNOSIS — B18.1 CHRONIC HEPATITIS B: ICD-10-CM

## 2024-09-24 DIAGNOSIS — N18.9 ANEMIA ASSOCIATED WITH CHRONIC RENAL FAILURE: Primary | ICD-10-CM

## 2024-09-24 DIAGNOSIS — D64.9 ANEMIA, UNSPECIFIED TYPE: ICD-10-CM

## 2024-09-24 DIAGNOSIS — Z79.01 LONG TERM (CURRENT) USE OF ANTICOAGULANTS: ICD-10-CM

## 2024-09-24 DIAGNOSIS — I48.91 ON COUMADIN FOR ATRIAL FIBRILLATION: Primary | ICD-10-CM

## 2024-09-24 DIAGNOSIS — K74.60 HEPATIC CIRRHOSIS, UNSPECIFIED HEPATIC CIRRHOSIS TYPE, UNSPECIFIED WHETHER ASCITES PRESENT: ICD-10-CM

## 2024-09-24 LAB
BASOPHILS # BLD AUTO: 0.02 K/UL (ref 0–0.2)
BASOPHILS NFR BLD: 0.3 % (ref 0–1.9)
DIFFERENTIAL METHOD BLD: ABNORMAL
EOSINOPHIL # BLD AUTO: 0.4 K/UL (ref 0–0.5)
EOSINOPHIL NFR BLD: 5.6 % (ref 0–8)
ERYTHROCYTE [DISTWIDTH] IN BLOOD BY AUTOMATED COUNT: 17.6 % (ref 11.5–14.5)
HCT VFR BLD AUTO: 31 % (ref 40–54)
HGB BLD-MCNC: 10.1 G/DL (ref 14–18)
IMM GRANULOCYTES # BLD AUTO: 0.02 K/UL (ref 0–0.04)
IMM GRANULOCYTES NFR BLD AUTO: 0.3 % (ref 0–0.5)
INR PPP: 1.7 (ref 0.8–1.2)
LYMPHOCYTES # BLD AUTO: 1 K/UL (ref 1–4.8)
LYMPHOCYTES NFR BLD: 16.5 % (ref 18–48)
MCH RBC QN AUTO: 34.1 PG (ref 27–31)
MCHC RBC AUTO-ENTMCNC: 32.6 G/DL (ref 32–36)
MCV RBC AUTO: 105 FL (ref 82–98)
MONOCYTES # BLD AUTO: 0.7 K/UL (ref 0.3–1)
MONOCYTES NFR BLD: 11.6 % (ref 4–15)
NEUTROPHILS # BLD AUTO: 4.1 K/UL (ref 1.8–7.7)
NEUTROPHILS NFR BLD: 65.7 % (ref 38–73)
NRBC BLD-RTO: 0 /100 WBC
PLATELET # BLD AUTO: 152 K/UL (ref 150–450)
PMV BLD AUTO: 10.7 FL (ref 9.2–12.9)
PROTHROMBIN TIME: 18 SEC (ref 9–12.5)
RBC # BLD AUTO: 2.96 M/UL (ref 4.6–6.2)
WBC # BLD AUTO: 6.23 K/UL (ref 3.9–12.7)

## 2024-09-24 PROCEDURE — 85610 PROTHROMBIN TIME: CPT | Performed by: INTERNAL MEDICINE

## 2024-09-24 PROCEDURE — 99215 OFFICE O/P EST HI 40 MIN: CPT | Mod: PBBFAC | Performed by: INTERNAL MEDICINE

## 2024-09-24 PROCEDURE — 99999 PR PBB SHADOW E&M-EST. PATIENT-LVL V: CPT | Mod: PBBFAC,,, | Performed by: INTERNAL MEDICINE

## 2024-09-24 PROCEDURE — 85025 COMPLETE CBC W/AUTO DIFF WBC: CPT | Performed by: INTERNAL MEDICINE

## 2024-09-24 PROCEDURE — 99204 OFFICE O/P NEW MOD 45 MIN: CPT | Mod: S$PBB,,, | Performed by: INTERNAL MEDICINE

## 2024-09-24 PROCEDURE — 36415 COLL VENOUS BLD VENIPUNCTURE: CPT | Performed by: INTERNAL MEDICINE

## 2024-09-24 NOTE — PROGRESS NOTES
Subjective:       Patient ID: Isidro White Jr. is a 54 y.o. male.    Chief Complaint: Results, Chronic Renal Failure, and Anemia    HPI:  54-year-old male referred for evaluation of anemia secondary to chronic renal failure patient states that he wishes hemoglobin to be elevated makes him feel better patient is referred for further evaluation patient currently is on Epogen through dialysis but request evaluation    Past Medical History:   Diagnosis Date    Anemia     Aortic valve stenosis     s/p mechanical AVR    Atrial fibrillation     Atrial flutter     Cardiomyopathy     CHF (congestive heart failure)     Drug-induced erectile dysfunction     ESRD due to hypertension     HD M, W, F    ESRD on dialysis     GERD (gastroesophageal reflux disease)     Hepatitis B     Hyperlipidemia     Hypertension     Nightmares     Obesity     DANIEL (obstructive sleep apnea) 11/12/2019    Secondary hyperparathyroidism of renal origin     Supraventricular tachycardia     atrial tachycardia    Tachycardia     Valvular regurgitation     AI, TR     Family History   Problem Relation Name Age of Onset    Leukemia Mother      Cancer Mother      Heart attack Father          massive MI at age 67    No Known Problems Sister      No Known Problems Brother      No Known Problems Sister      No Known Problems Sister      Heart failure Paternal Grandmother      Diabetes Paternal Aunt      Hypertension Paternal Aunt      Leukemia Paternal Aunt          CLL    Suicide Paternal Uncle      Anesthesia problems Paternal Uncle      Diabetes Paternal Aunt      Stroke Paternal Aunt      Valvular heart disease Maternal Aunt      Kidney disease Neg Hx      Colon cancer Neg Hx       Social History     Socioeconomic History    Marital status: Single   Tobacco Use    Smoking status: Never     Passive exposure: Never    Smokeless tobacco: Never   Substance and Sexual Activity    Alcohol use: Yes     Alcohol/week: 14.0 standard drinks of alcohol     Types:  14 Glasses of wine per week     Comment: Drinks 1-2 glasses of red wine - quit in 2016; does have heavy drinking history; started drinking at age 13; was drinking a 12 pack over the weekend and two 24 ounces of beer a day during the week along with a pint of hard alcohol    Drug use: No    Sexual activity: Not Currently     Partners: Female   Social History Narrative    Caregiver Nurse Marjan Swain; 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     Social Determinants of Health     Financial Resource Strain: Low Risk  (12/12/2023)    Overall Financial Resource Strain (CARDIA)     Difficulty of Paying Living Expenses: Not very hard   Food Insecurity: No Food Insecurity (5/20/2024)    Hunger Vital Sign     Worried About Running Out of Food in the Last Year: Never true     Ran Out of Food in the Last Year: Never true   Transportation Needs: No Transportation Needs (5/20/2024)    TRANSPORTATION NEEDS     Transportation : No   Physical Activity: Inactive (12/12/2023)    Exercise Vital Sign     Days of Exercise per Week: 0 days     Minutes of Exercise per Session: 0 min   Stress: No Stress Concern Present (12/12/2023)    [employer] [place] [employer] - Occupational Stress Questionnaire     Feeling of Stress : Not at all   Housing Stability: Low Risk  (5/20/2024)    Housing Stability Vital Sign     Unable to Pay for Housing in the Last Year: No     Homeless in the Last Year: No     Past Surgical History:   Procedure Laterality Date    ASD REPAIR      age 8 [facility]    AV Graft Creation Left 03/2017    CARDIAC CATHETERIZATION      [facility]     CARDIAC VALVE SURGERY  02/08/2017    22 mm Medtronic AV, 28 mm TV Medtronic annuloplaty ring    COLONOSCOPY N/A 8/27/2019    Procedure: COLONOSCOPY;  Surgeon: Addie John MD;  Location: [facility] ENDO;  Service: Endoscopy;  Laterality: N/A;  dialysis pt *needs K*    COLONOSCOPY N/A 9/3/2020    Procedure: COLONOSCOPY-need INR;  Surgeon: Jemma Youngblood MD;  Location: [facility]  "ENDO;  Service: Endoscopy;  Laterality: N/A;    ESOPHAGOGASTRODUODENOSCOPY N/A 8/27/2019    Procedure: EGD (ESOPHAGOGASTRODUODENOSCOPY);  Surgeon: Addie John MD;  Location: [facility] ENDO;  Service: Endoscopy;  Laterality: N/A;    INSERTION OF INFLATABLE PENILE PROSTHESIS N/A 5/21/2024    Procedure: INSERTION, PENILE PROSTHESIS, INFLATABLE;  Surgeon: Bonifacio Shirley MD;  Location: [facility] OR;  Service: Urology;  Laterality: N/A;    RADIOFREQUENCY ABLATION  03/13/2017    Atrial tachycardia    REMOVAL OF GRAFT Left 7/2/2020    Procedure: REMOVAL, GRAFT;  Surgeon: Sascha Sidhu MD;  Location: [facility] OR;  Service: Peripheral Vascular;  Laterality: Left;    RIGHT HEART CATHETERIZATION Right 8/12/2021    Procedure: INSERTION, CATHETER, RIGHT HEART;  Surgeon: Mariah Pierce MD;  Location: [facility] CATH LAB;  Service: Cardiology;  Laterality: Right;       Labs:  Lab Results   Component Value Date    WBC 6.23 09/24/2024    HGB 10.1 (L) 09/24/2024    HCT 31.0 (L) 09/24/2024     (H) 09/24/2024     09/24/2024     BMP  Lab Results   Component Value Date     08/20/2024    K 4.0 08/20/2024    CL 98 08/20/2024    CO2 26 08/20/2024    BUN 64 (H) 08/20/2024    CREATININE 10.0 (H) 08/20/2024    CALCIUM 10.6 (H) 08/20/2024    ANIONGAP 18 (H) 08/20/2024    ESTGFRAFRICA 4.9 (A) 07/21/2022    EGFRNONAA 4.3 (A) 07/21/2022     Lab Results   Component Value Date    ALT 14 08/20/2024    AST 16 08/20/2024    ALKPHOS 75 08/20/2024    BILITOT 0.6 08/20/2024       Lab Results   Component Value Date    IRON 30 (L) 03/10/2017    TIBC 161 (L) 03/10/2017    FERRITIN 1,904 (H) 07/02/2024     Lab Results   Component Value Date    CFRQWYMS02 >2000 (H) 05/24/2024     Lab Results   Component Value Date    FOLATE 8.0 05/24/2024     No results found for: "TSH"      Review of Systems   Constitutional:  Positive for fatigue. Negative for activity change, appetite change, chills, diaphoresis, fever and unexpected weight change.   HENT:  " Negative for congestion, dental problem, drooling, ear discharge, ear pain, facial swelling, hearing loss, mouth sores, nosebleeds, postnasal drip, rhinorrhea, sinus pressure, sneezing, sore throat, tinnitus, trouble swallowing and voice change.    Eyes:  Negative for photophobia, pain, discharge, redness, itching and visual disturbance.   Respiratory:  Negative for apnea, cough, choking, chest tightness, shortness of breath, wheezing and stridor.    Cardiovascular:  Negative for chest pain, palpitations and leg swelling.   Gastrointestinal:  Negative for abdominal distention, abdominal pain, anal bleeding, blood in stool, constipation, diarrhea, nausea, rectal pain and vomiting.   Endocrine: Negative for cold intolerance, heat intolerance, polydipsia, polyphagia and polyuria.   Genitourinary:  Negative for decreased urine volume, difficulty urinating, dysuria, enuresis, flank pain, frequency, genital sores, hematuria, penile discharge, penile pain, penile swelling, scrotal swelling, testicular pain and urgency.   Musculoskeletal:  Negative for arthralgias, back pain, gait problem, joint swelling, myalgias, neck pain and neck stiffness.   Skin:  Negative for color change, pallor, rash and wound.   Allergic/Immunologic: Negative for environmental allergies, food allergies and immunocompromised state.   Neurological:  Positive for weakness. Negative for dizziness, tremors, seizures, syncope, facial asymmetry, speech difficulty, light-headedness, numbness and headaches.   Hematological:  Negative for adenopathy. Does not bruise/bleed easily.   Psychiatric/Behavioral:  Negative for agitation, behavioral problems, confusion, decreased concentration, dysphoric mood, hallucinations, self-injury, sleep disturbance and suicidal ideas. The patient is not nervous/anxious and is not hyperactive.        Objective:      Physical Exam  Vitals reviewed.   Constitutional:       General: He is not in acute distress.     Appearance:  He is well-developed. He is not diaphoretic.   HENT:      Head: Normocephalic.      Right Ear: External ear normal.      Left Ear: External ear normal.      Nose: Nose normal.      Right Sinus: No maxillary sinus tenderness or frontal sinus tenderness.      Left Sinus: No maxillary sinus tenderness or frontal sinus tenderness.      Mouth/Throat:      Pharynx: No oropharyngeal exudate.   Eyes:      General: Lids are normal. No scleral icterus.        Right eye: No discharge.         Left eye: No discharge.      Extraocular Movements:      Right eye: Normal extraocular motion.      Left eye: Normal extraocular motion.      Conjunctiva/sclera:      Right eye: Right conjunctiva is not injected. No hemorrhage.     Left eye: Left conjunctiva is not injected. No hemorrhage.     Pupils: Pupils are equal, round, and reactive to light.   Neck:      Thyroid: No thyromegaly.      Vascular: No JVD.      Trachea: No tracheal deviation.   Cardiovascular:      Rate and Rhythm: Normal rate.   Pulmonary:      Effort: Pulmonary effort is normal. No respiratory distress.      Breath sounds: No stridor.   Abdominal:      General: Bowel sounds are normal.      Palpations: Abdomen is soft. There is no hepatomegaly, splenomegaly or mass.      Tenderness: There is no abdominal tenderness.   Musculoskeletal:         General: No tenderness. Normal range of motion.      Cervical back: Normal range of motion and neck supple.   Lymphadenopathy:      Head:      Right side of head: No posterior auricular or occipital adenopathy.      Left side of head: No posterior auricular or occipital adenopathy.      Cervical: No cervical adenopathy.      Right cervical: No superficial, deep or posterior cervical adenopathy.     Left cervical: No superficial, deep or posterior cervical adenopathy.      Upper Body:      Right upper body: No supraclavicular adenopathy.      Left upper body: No supraclavicular adenopathy.   Skin:     General: Skin is dry.       Findings: No erythema or rash.      Nails: There is no clubbing.   Neurological:      Mental Status: He is alert and oriented to person, place, and time.      Cranial Nerves: No cranial nerve deficit.      Motor: Weakness present.      Coordination: Coordination normal.   Psychiatric:         Behavior: Behavior normal.         Thought Content: Thought content normal.         Judgment: Judgment normal.             Assessment:      1. Anemia associated with chronic renal failure    2. Anemia, unspecified type    3. Other vitamin B12 deficiency anemias    4. Thrombocytopenia    5. On Coumadin for atrial fibrillation    6. Chronic hepatitis B    7. Hepatic cirrhosis, unspecified hepatic cirrhosis type, unspecified whether ascites present           Med Onc Chart Routing      Follow up with physician . Proceed with laboratory draw today return to clinic in 70 this 10 business days for review   Follow up with VIJAY    Infusion scheduling note    Injection scheduling note    Labs    Imaging    Pharmacy appointment    Other referrals                   Plan:      proceed with laboratory evaluation as to cause of anemia.  At this time will assess whether not additional workup is warranted.  With bone marrow if not able to discern.  High likelihood this represents a neck secondary anemia secondary to renal insufficiency I have told him that his hemoglobin will never be between 14 and 18.  Because of increased risk of thromboembolic disease on dialysis will review in follow-up patient does not wish to proceed with portal        Andres Yanez Jr, MD FACP

## 2024-09-24 NOTE — PROGRESS NOTES
INR not at goal-1.7, slightly better. Medications, chart, and patient findings reviewed. See calendar for adjustments to dose and follow up plan.  Pt states that he took the correct dose last week.  No missed doses, INR is slightly improved, but not to goal.  I have asked him to avoid all Vitamin K.  Boost dose today with 10 mg then tomorrow with 7.5 mg.  ER with any s/s or clotting or stroke.  Repeat INR on Monday.

## 2024-09-30 ENCOUNTER — ANTI-COAG VISIT (OUTPATIENT)
Dept: CARDIOLOGY | Facility: CLINIC | Age: 54
End: 2024-09-30
Payer: MEDICARE

## 2024-09-30 DIAGNOSIS — Z95.2 HISTORY OF MECHANICAL AORTIC VALVE REPLACEMENT: ICD-10-CM

## 2024-09-30 DIAGNOSIS — I48.91 ON COUMADIN FOR ATRIAL FIBRILLATION: ICD-10-CM

## 2024-09-30 DIAGNOSIS — Z79.01 ON COUMADIN FOR ATRIAL FIBRILLATION: ICD-10-CM

## 2024-09-30 DIAGNOSIS — Z79.01 LONG TERM (CURRENT) USE OF ANTICOAGULANTS: Primary | ICD-10-CM

## 2024-09-30 LAB
CTP QC/QA: YES
INR PPP: 1.8 (ref 2–3)

## 2024-09-30 PROCEDURE — 99999PBSHW POCT INR: Mod: PBBFAC,,,

## 2024-09-30 PROCEDURE — 85610 PROTHROMBIN TIME: CPT | Mod: PBBFAC

## 2024-09-30 NOTE — PROGRESS NOTES
Chart reviewed, agree with LPN plan.      
Patient's INR is sub-therapeutic at 1.8. Patient confirms he followed previous instructions. Patient reports no changes. Advised patient of increased risk clotting; signs/symptoms of clotting and need to go to ED if he experiences any. Patient reports he is not having any signs/symptoms of clotting. Instructions given: Will increase overall maintenance dose of warfarin to 7.5 mg on Mondays and Thursdays; and 5 mg all other days. Recheck on 10/8/24 with other appointment. Calendar reviewed with patient. Patient verbalizes understanding.        
24.2

## 2024-10-07 ENCOUNTER — OFFICE VISIT (OUTPATIENT)
Dept: HEMATOLOGY/ONCOLOGY | Facility: CLINIC | Age: 54
End: 2024-10-07
Payer: MEDICARE

## 2024-10-07 VITALS
HEIGHT: 74 IN | OXYGEN SATURATION: 88 % | HEART RATE: 97 BPM | DIASTOLIC BLOOD PRESSURE: 88 MMHG | TEMPERATURE: 98 F | WEIGHT: 209.44 LBS | SYSTOLIC BLOOD PRESSURE: 169 MMHG | BODY MASS INDEX: 26.88 KG/M2

## 2024-10-07 DIAGNOSIS — Z79.01 ON COUMADIN FOR ATRIAL FIBRILLATION: ICD-10-CM

## 2024-10-07 DIAGNOSIS — N18.9 ANEMIA ASSOCIATED WITH CHRONIC RENAL FAILURE: Primary | ICD-10-CM

## 2024-10-07 DIAGNOSIS — I48.91 ON COUMADIN FOR ATRIAL FIBRILLATION: ICD-10-CM

## 2024-10-07 DIAGNOSIS — D63.1 ANEMIA ASSOCIATED WITH CHRONIC RENAL FAILURE: Primary | ICD-10-CM

## 2024-10-07 DIAGNOSIS — Z99.2 ESRD (END STAGE RENAL DISEASE) ON DIALYSIS: Chronic | ICD-10-CM

## 2024-10-07 DIAGNOSIS — D69.6 THROMBOCYTOPENIA: ICD-10-CM

## 2024-10-07 DIAGNOSIS — N18.6 ESRD (END STAGE RENAL DISEASE) ON DIALYSIS: Chronic | ICD-10-CM

## 2024-10-07 PROBLEM — D63.8 ANEMIA OF CHRONIC DISEASE: Status: RESOLVED | Noted: 2024-05-22 | Resolved: 2024-10-07

## 2024-10-07 PROCEDURE — 99214 OFFICE O/P EST MOD 30 MIN: CPT | Mod: S$PBB,,, | Performed by: INTERNAL MEDICINE

## 2024-10-07 PROCEDURE — 99214 OFFICE O/P EST MOD 30 MIN: CPT | Mod: PBBFAC | Performed by: INTERNAL MEDICINE

## 2024-10-07 PROCEDURE — 99999 PR PBB SHADOW E&M-EST. PATIENT-LVL IV: CPT | Mod: PBBFAC,,, | Performed by: INTERNAL MEDICINE

## 2024-10-07 RX ORDER — HYDROXYZINE HYDROCHLORIDE 25 MG/1
TABLET, FILM COATED ORAL
Qty: 90 TABLET | Refills: 3 | Status: SHIPPED | OUTPATIENT
Start: 2024-10-07

## 2024-10-07 NOTE — PROGRESS NOTES
Subjective:       Patient ID: Isidro White Jr. is a 54 y.o. male.    Chief Complaint: Results, Chronic Renal Failure, and Anemia    HPI:  54-year-old male end-stage renal patient evaluation anemia.  To see whether not reversible causes use to increase his hemoglobin acceptable level currently analysis Friday    Past Medical History:   Diagnosis Date    Anemia     Aortic valve stenosis     s/p mechanical AVR    Atrial fibrillation     Atrial flutter     Cardiomyopathy     CHF (congestive heart failure)     Drug-induced erectile dysfunction     ESRD due to hypertension     HD M, W, F    ESRD on dialysis     GERD (gastroesophageal reflux disease)     Hepatitis B     Hyperlipidemia     Hypertension     Nightmares     Obesity     DANIEL (obstructive sleep apnea) 11/12/2019    Secondary hyperparathyroidism of renal origin     Supraventricular tachycardia     atrial tachycardia    Tachycardia     Valvular regurgitation     AI, TR     Family History   Problem Relation Name Age of Onset    Leukemia Mother      Cancer Mother      Heart attack Father          massive MI at age 67    No Known Problems Sister      No Known Problems Brother      No Known Problems Sister      No Known Problems Sister      Heart failure Paternal Grandmother      Diabetes Paternal Aunt      Hypertension Paternal Aunt      Leukemia Paternal Aunt          CLL    Suicide Paternal Uncle      Anesthesia problems Paternal Uncle      Diabetes Paternal Aunt      Stroke Paternal Aunt      Valvular heart disease Maternal Aunt      Kidney disease Neg Hx      Colon cancer Neg Hx       Social History     Socioeconomic History    Marital status: Single   Tobacco Use    Smoking status: Never     Passive exposure: Never    Smokeless tobacco: Never   Substance and Sexual Activity    Alcohol use: Yes     Alcohol/week: 14.0 standard drinks of alcohol     Types: 14 Glasses of wine per week     Comment: Drinks 1-2 glasses of red wine - quit in 2016; does have heavy  drinking history; started drinking at age 13; was drinking a 12 pack over the weekend and two 24 ounces of beer a day during the week along with a pint of hard alcohol    Drug use: No    Sexual activity: Not Currently     Partners: Female   Social History Narrative    Caregiver Nurse Marjan Swain; 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     Social Drivers of Health     Financial Resource Strain: Low Risk  (12/12/2023)    Overall Financial Resource Strain (CARDIA)     Difficulty of Paying Living Expenses: Not very hard   Food Insecurity: No Food Insecurity (5/20/2024)    Hunger Vital Sign     Worried About Running Out of Food in the Last Year: Never true     Ran Out of Food in the Last Year: Never true   Transportation Needs: No Transportation Needs (5/20/2024)    TRANSPORTATION NEEDS     Transportation : No   Physical Activity: Inactive (12/12/2023)    Exercise Vital Sign     Days of Exercise per Week: 0 days     Minutes of Exercise per Session: 0 min   Stress: No Stress Concern Present (12/12/2023)    [employer] [place] [employer] - Occupational Stress Questionnaire     Feeling of Stress : Not at all   Housing Stability: Low Risk  (5/20/2024)    Housing Stability Vital Sign     Unable to Pay for Housing in the Last Year: No     Homeless in the Last Year: No     Past Surgical History:   Procedure Laterality Date    ASD REPAIR      age 8 [facility]    AV Graft Creation Left 03/2017    CARDIAC CATHETERIZATION      [facility]     CARDIAC VALVE SURGERY  02/08/2017    22 mm Medtronic AV, 28 mm TV Medtronic annuloplaty ring    COLONOSCOPY N/A 8/27/2019    Procedure: COLONOSCOPY;  Surgeon: Addie John MD;  Location: [facility] ENDO;  Service: Endoscopy;  Laterality: N/A;  dialysis pt *needs K*    COLONOSCOPY N/A 9/3/2020    Procedure: COLONOSCOPY-need INR;  Surgeon: Jemma Youngblood MD;  Location: [facility];  Service: Endoscopy;  Laterality: N/A;    ESOPHAGOGASTRODUODENOSCOPY N/A 8/27/2019    Procedure: EGD  (ESOPHAGOGASTRODUODENOSCOPY);  Surgeon: Addie John MD;  Location: [facility] ENDO;  Service: Endoscopy;  Laterality: N/A;    INSERTION OF INFLATABLE PENILE PROSTHESIS N/A 5/21/2024    Procedure: INSERTION, PENILE PROSTHESIS, INFLATABLE;  Surgeon: Bonifacio Shirley MD;  Location: [facility] OR;  Service: Urology;  Laterality: N/A;    RADIOFREQUENCY ABLATION  03/13/2017    Atrial tachycardia    REMOVAL OF GRAFT Left 7/2/2020    Procedure: REMOVAL, GRAFT;  Surgeon: Sascha Sdihu MD;  Location: [facility] OR;  Service: Peripheral Vascular;  Laterality: Left;    RIGHT HEART CATHETERIZATION Right 8/12/2021    Procedure: INSERTION, CATHETER, RIGHT HEART;  Surgeon: Mariah Pierce MD;  Location: [facility] CATH LAB;  Service: Cardiology;  Laterality: Right;       Labs:  Lab Results   Component Value Date    WBC 6.23 09/24/2024    HGB 10.1 (L) 09/24/2024    HCT 31.0 (L) 09/24/2024     (H) 09/24/2024     09/24/2024     BMP  Lab Results   Component Value Date     09/24/2024    K 4.4 09/24/2024     09/24/2024    CO2 27 09/24/2024    BUN 69 (H) 09/24/2024    CREATININE 12.3 (H) 09/24/2024    CALCIUM 9.4 09/24/2024    ANIONGAP 16 09/24/2024    ESTGFRAFRICA 4.9 (A) 07/21/2022    EGFRNONAA 4.3 (A) 07/21/2022     Lab Results   Component Value Date    ALT 13 09/24/2024    AST 12 09/24/2024    ALKPHOS 79 09/24/2024    BILITOT 0.5 09/24/2024       Lab Results   Component Value Date    IRON 61 09/24/2024    TIBC 260 09/24/2024    FERRITIN 1,904 (H) 07/02/2024     Lab Results   Component Value Date    KOKOCIKK28 1164 (H) 09/24/2024     Lab Results   Component Value Date    FOLATE 8.0 05/24/2024     Lab Results   Component Value Date    TSH 2.953 09/24/2024         Review of Systems   Constitutional:  Negative for activity change, appetite change, chills, diaphoresis, fatigue, fever and unexpected weight change.   HENT:  Negative for congestion, dental problem, drooling, ear discharge, ear pain, facial swelling, hearing  loss, mouth sores, nosebleeds, postnasal drip, rhinorrhea, sinus pressure, sneezing, sore throat, tinnitus, trouble swallowing and voice change.    Eyes:  Negative for photophobia, pain, discharge, redness, itching and visual disturbance.   Respiratory:  Negative for apnea, cough, choking, chest tightness, shortness of breath, wheezing and stridor.    Cardiovascular:  Negative for chest pain, palpitations and leg swelling.   Gastrointestinal:  Negative for abdominal distention, abdominal pain, anal bleeding, blood in stool, constipation, diarrhea, nausea, rectal pain and vomiting.   Endocrine: Negative for cold intolerance, heat intolerance, polydipsia, polyphagia and polyuria.   Genitourinary:  Negative for decreased urine volume, difficulty urinating, dysuria, enuresis, flank pain, frequency, genital sores, hematuria, penile discharge, penile pain, penile swelling, scrotal swelling, testicular pain and urgency.   Musculoskeletal:  Negative for arthralgias, back pain, gait problem, joint swelling, myalgias, neck pain and neck stiffness.   Skin:  Negative for color change, pallor, rash and wound.   Allergic/Immunologic: Negative for environmental allergies, food allergies and immunocompromised state.   Neurological:  Negative for dizziness, tremors, seizures, syncope, facial asymmetry, speech difficulty, weakness, light-headedness, numbness and headaches.   Hematological:  Negative for adenopathy. Does not bruise/bleed easily.   Psychiatric/Behavioral:  Positive for dysphoric mood. Negative for agitation, behavioral problems, confusion, decreased concentration, hallucinations, self-injury, sleep disturbance and suicidal ideas. The patient is nervous/anxious. The patient is not hyperactive.        Objective:      Physical Exam  Vitals reviewed.   Constitutional:       General: He is not in acute distress.     Appearance: Normal appearance. He is well-developed. He is ill-appearing. He is not diaphoretic.   HENT:       Head: Normocephalic.      Right Ear: External ear normal.      Left Ear: External ear normal.      Nose: Nose normal.      Right Sinus: No maxillary sinus tenderness or frontal sinus tenderness.      Left Sinus: No maxillary sinus tenderness or frontal sinus tenderness.      Mouth/Throat:      Pharynx: No oropharyngeal exudate.   Eyes:      General: Lids are normal. No scleral icterus.        Right eye: No discharge.         Left eye: No discharge.      Extraocular Movements:      Right eye: Normal extraocular motion.      Left eye: Normal extraocular motion.      Conjunctiva/sclera:      Right eye: Right conjunctiva is not injected. No hemorrhage.     Left eye: Left conjunctiva is not injected. No hemorrhage.     Pupils: Pupils are equal, round, and reactive to light.   Neck:      Thyroid: No thyromegaly.      Vascular: No JVD.      Trachea: No tracheal deviation.   Cardiovascular:      Rate and Rhythm: Normal rate.   Pulmonary:      Effort: Pulmonary effort is normal. No respiratory distress.      Breath sounds: No stridor.   Abdominal:      General: Bowel sounds are normal.      Palpations: Abdomen is soft. There is no hepatomegaly, splenomegaly or mass.      Tenderness: There is no abdominal tenderness.   Musculoskeletal:         General: No tenderness. Normal range of motion.      Cervical back: Normal range of motion and neck supple.   Lymphadenopathy:      Head:      Right side of head: No posterior auricular or occipital adenopathy.      Left side of head: No posterior auricular or occipital adenopathy.      Cervical: No cervical adenopathy.      Right cervical: No superficial, deep or posterior cervical adenopathy.     Left cervical: No superficial, deep or posterior cervical adenopathy.      Upper Body:      Right upper body: No supraclavicular adenopathy.      Left upper body: No supraclavicular adenopathy.   Skin:     General: Skin is dry.      Findings: No erythema or rash.      Nails: There is no  clubbing.   Neurological:      Mental Status: He is alert and oriented to person, place, and time.      Cranial Nerves: No cranial nerve deficit.      Coordination: Coordination normal.   Psychiatric:         Behavior: Behavior normal.         Thought Content: Thought content normal.         Judgment: Judgment normal.             Assessment:      1. Anemia associated with chronic renal failure    2. On Coumadin for atrial fibrillation    3. Thrombocytopenia    4. ESRD (end stage renal disease) on dialysis           Med Onc Chart Routing      Follow up with physician . Return to clinic in 6 months either a 2-0 Thursday patient analysis Friday with a CBC serum iron TIBC ferritin   Follow up with VIJAY    Infusion scheduling note    Injection scheduling note    Labs    Imaging    Pharmacy appointment    Other referrals                   Plan:     Reviewed information with hemoglobin 10.1 hold any further additional workup at this point request follow-up in 6 months can be seen sooner if he wishes.  On needs follow-up on Tuesdays and Thursdays currently on dialysis Monday Wednesday and Friday discussed implications and answered questions with him        Andres Yanez Jr, MD FACP

## 2024-10-08 ENCOUNTER — OFFICE VISIT (OUTPATIENT)
Dept: CARDIOLOGY | Facility: CLINIC | Age: 54
End: 2024-10-08
Payer: MEDICARE

## 2024-10-08 ENCOUNTER — ANTI-COAG VISIT (OUTPATIENT)
Dept: CARDIOLOGY | Facility: CLINIC | Age: 54
End: 2024-10-08
Payer: MEDICARE

## 2024-10-08 VITALS
WEIGHT: 202.81 LBS | DIASTOLIC BLOOD PRESSURE: 90 MMHG | HEART RATE: 84 BPM | SYSTOLIC BLOOD PRESSURE: 150 MMHG | BODY MASS INDEX: 26.03 KG/M2 | HEIGHT: 74 IN | OXYGEN SATURATION: 97 %

## 2024-10-08 DIAGNOSIS — N18.6 END-STAGE RENAL DISEASE: ICD-10-CM

## 2024-10-08 DIAGNOSIS — Z95.2 HISTORY OF MECHANICAL AORTIC VALVE REPLACEMENT: ICD-10-CM

## 2024-10-08 DIAGNOSIS — Z79.01 LONG TERM (CURRENT) USE OF ANTICOAGULANTS: Primary | ICD-10-CM

## 2024-10-08 DIAGNOSIS — N18.6 ESRD (END STAGE RENAL DISEASE) ON DIALYSIS: ICD-10-CM

## 2024-10-08 DIAGNOSIS — Z95.2 HISTORY OF MECHANICAL AORTIC VALVE REPLACEMENT: Primary | ICD-10-CM

## 2024-10-08 DIAGNOSIS — I47.10 SVT (SUPRAVENTRICULAR TACHYCARDIA): ICD-10-CM

## 2024-10-08 DIAGNOSIS — I48.91 ATRIAL FIBRILLATION, UNSPECIFIED TYPE: ICD-10-CM

## 2024-10-08 DIAGNOSIS — I48.91 ON COUMADIN FOR ATRIAL FIBRILLATION: ICD-10-CM

## 2024-10-08 DIAGNOSIS — I70.0 AORTIC ATHEROSCLEROSIS: ICD-10-CM

## 2024-10-08 DIAGNOSIS — I11.0 HYPERTENSIVE CARDIOMEGALY WITH HEART FAILURE: ICD-10-CM

## 2024-10-08 DIAGNOSIS — Z95.2 S/P AVR (AORTIC VALVE REPLACEMENT): ICD-10-CM

## 2024-10-08 DIAGNOSIS — I27.20 PULMONARY HTN: ICD-10-CM

## 2024-10-08 DIAGNOSIS — I50.32 CHRONIC DIASTOLIC HEART FAILURE: ICD-10-CM

## 2024-10-08 DIAGNOSIS — Z79.01 ANTICOAGULATED: ICD-10-CM

## 2024-10-08 DIAGNOSIS — I10 ESSENTIAL HYPERTENSION: ICD-10-CM

## 2024-10-08 DIAGNOSIS — I27.20 PULMONARY HYPERTENSION: ICD-10-CM

## 2024-10-08 DIAGNOSIS — Z79.01 LONG TERM (CURRENT) USE OF ANTICOAGULANTS: ICD-10-CM

## 2024-10-08 DIAGNOSIS — I47.19 ATRIAL TACHYCARDIA: ICD-10-CM

## 2024-10-08 DIAGNOSIS — Z79.01 ON COUMADIN FOR ATRIAL FIBRILLATION: ICD-10-CM

## 2024-10-08 DIAGNOSIS — Z95.2 H/O MECHANICAL AORTIC VALVE REPLACEMENT: ICD-10-CM

## 2024-10-08 DIAGNOSIS — Z99.2 ESRD (END STAGE RENAL DISEASE) ON DIALYSIS: ICD-10-CM

## 2024-10-08 DIAGNOSIS — Z98.890 HISTORY OF RADIOFREQUENCY ABLATION FOR COMPLEX RIGHT ATRIAL ARRHYTHMIA: ICD-10-CM

## 2024-10-08 DIAGNOSIS — I48.0 PAF (PAROXYSMAL ATRIAL FIBRILLATION): ICD-10-CM

## 2024-10-08 DIAGNOSIS — I49.3 PVC'S (PREMATURE VENTRICULAR CONTRACTIONS): ICD-10-CM

## 2024-10-08 DIAGNOSIS — I47.29 NSVT (NONSUSTAINED VENTRICULAR TACHYCARDIA): ICD-10-CM

## 2024-10-08 LAB
CTP QC/QA: YES
INR PPP: 1.7 (ref 2–3)

## 2024-10-08 PROCEDURE — 99999PBSHW POCT INR: Mod: PBBFAC,,,

## 2024-10-08 PROCEDURE — 85610 PROTHROMBIN TIME: CPT | Mod: PBBFAC

## 2024-10-08 PROCEDURE — 99999 PR PBB SHADOW E&M-EST. PATIENT-LVL IV: CPT | Mod: PBBFAC,,, | Performed by: INTERNAL MEDICINE

## 2024-10-08 PROCEDURE — 99214 OFFICE O/P EST MOD 30 MIN: CPT | Mod: PBBFAC | Performed by: INTERNAL MEDICINE

## 2024-10-08 RX ORDER — HYDRALAZINE HYDROCHLORIDE 100 MG/1
100 TABLET, FILM COATED ORAL EVERY 8 HOURS
Qty: 270 TABLET | Refills: 1 | Status: SHIPPED | OUTPATIENT
Start: 2024-10-08 | End: 2024-10-08

## 2024-10-08 RX ORDER — OLMESARTAN MEDOXOMIL 40 MG/1
40 TABLET ORAL NIGHTLY
Qty: 90 TABLET | Refills: 3 | Status: SHIPPED | OUTPATIENT
Start: 2024-10-08 | End: 2025-10-08

## 2024-10-08 RX ORDER — HYDRALAZINE HYDROCHLORIDE 100 MG/1
100 TABLET, FILM COATED ORAL EVERY 8 HOURS
Qty: 270 TABLET | Refills: 1 | Status: SHIPPED | OUTPATIENT
Start: 2024-10-08 | End: 2025-10-08

## 2024-10-08 RX ORDER — OLMESARTAN MEDOXOMIL 40 MG/1
40 TABLET ORAL NIGHTLY
Qty: 90 TABLET | Refills: 3 | Status: SHIPPED | OUTPATIENT
Start: 2024-10-08 | End: 2024-10-08

## 2024-10-08 NOTE — PROGRESS NOTES
Patient's INR is sub-therapeutic at 1.7. Patient confirms he followed previous instructions. Patient reports he ate more dark leafy greens than usual. Advised patient of increased risk clotting; signs/symptoms of clotting and need to go to ED if he experiences any. Patient reports he kwabena not have any signs/symptoms of clotting. Sent to PharmD for dosing/instructions.

## 2024-10-08 NOTE — PROGRESS NOTES
Subjective:   Patient ID:  Isidro White Jr. is a 54 y.o. male who presents for cardiac consult of No chief complaint on file.          The patient came in today for cardiac consult of No chief complaint on file.      Isidro White Jr. is a 54 y.o. male pt with PAF, ASD closure at age 8 ([facility]), HFpEF, s/p AVR with a 22 mm mechanical valve and TV annuloplasty with a 28 mm ring 3/17, HTN, PHTN, Mitral stenosis, SVT, EP performed RFA (3/13/17) and has been on HD - MWF since Feb 8,2016 here for CV follow up.     6/25/24   Hospital Course:  54-year-old gentleman who was admitted preoperatively on May 19th for stabilization.  Nephrology and Cardiology were consulted, Medicine was also consulted to assist with the care.  Patient was initiated on dialysis hospital day 1.  Hospital day 2.  On May 21st patient was taken the operating room for insertion of inflatable penile prosthesis.  Patient initially did well, with no issues.  Unfortunately had some postoperative bleeding around his drain site, patient's most likely lost significant amount of blood and required 3 units transfused over the next 2 days.  Due to his reaction during dialysis postoperative day 1. Patient was taken to the ICU for acute monitoring.  Patient remains in the ICU until 524 when he was deemed stable to transfer back to the floor.  Patient's hemoglobin hematocrit will follow closely, no evidence residual bleeding after the drain was removed and stitched on proximally 05/23/2024.  Hemoglobin hematocrit demonstrates stability though over the evening to his discharge date of 05/26/2024.  Patient has swelling had reduced upon discharge, patient's INR was stable on his home dose of Coumadin.  During hospitalization Nephrology maintain care cystic with dialysis.  Hospital medicine had to monitor his blood pressure and pulse modifying his home medications, please see the chart note.  Either way patient was tolerating p.o. diet and pain  medications by postoperative day number 5 and deemed stable for discharge to home.  Patient will continue another 4 days of Levaquin, pain medication has been given, he has been instructed on how to take his Coumadin until he can returned to his Coumadin clinic next week.  Patient will go back to his usual dialysis clinic tomorrow.     Hosp follow up - was recently at [facility] for SOB and was more anemic    Component Ref Range & Units 1 mo ago  (5/26/24) 1 mo ago  (5/25/24) 1 mo ago  (5/24/24) 1 mo ago  (5/22/24) 1 mo ago  (5/22/24) 1 mo ago  (5/22/24) 1 mo ago  (5/22/24)   WBC 3.90 - 12.70 K/uL 10.88 10.98 10.75   11.77 11.17   RBC 4.60 - 6.20 M/uL 2.61 Low  2.58 Low  2.10 Low    2.23 Low  2.62 Low    Hemoglobin 14.0 - 18.0 g/dL 7.9 Low  7.9 Low  6.8 Low   8.7 Low  7.3 Low  8.5 Low    Hematocrit 40.0 - 54.0 % 24.2 Low  23.9 Low  19.8 Low Panic  CM 26.2 Low   22.2 Low  26.6 Low      He was given 2 units blood at hospital post op and then went to [facility] and was given another unit of blood.   ECHO 5/2024 with normal bi V function, grade 2 DD, AV valve stable - mechanical. Mild to mod MS - MG 11 mmHg, mild MR, mild to mod TR. Pulm HTN - PASP 70 mmHg.     7/23/24  BP and HR stable. Recently went to [facility] for blood transfusion - Hgb was 6.9 did not receive any blood then.   He needs to have EGD/Cscope - will need to be off coumadin and Lovenox bridge.     10/8/24  BP elevated today - 150/90. HR 84. BMI 26  He feels well now, HGB improved.   HE was taken off a lot of BP meds.         Results for orders placed during the hospital encounter of 05/02/24    Echo    Interpretation Summary    Left Ventricle: The left ventricle is normal in size. Normal wall thickness. There is concentric remodeling. Normal wall motion. Ejection fraction by visual approximation is 60%. Grade II diastolic dysfunction.    Right Ventricle: Normal right ventricular cavity size. Wall thickness is normal. Systolic function is normal.    Left Atrium: Left atrium  is severely dilated.    Aortic Valve: There is a tilting disc prosthetic valve in the aortic position.    Mitral Valve: Moderately calcified leaflets. Moderately restricted motion. There is mild to moderate stenosis. MVA 2.6 cm2 by planimetry, 1.6 cm2 by PHT.  The mean pressure gradient across the mitral valve is 11 mmHg at a heart rate of  bpm. There is mild regurgitation.    Tricuspid Valve: There is mild to moderate regurgitation.    Pulmonary Artery: There is pulmonary hypertension. The estimated pulmonary artery systolic pressure is 70 mmHg.    IVC/SVC: Normal venous pressure at 3 mmHg.      Results for orders placed during the hospital encounter of 04/27/23    Echo    Interpretation Summary  · The left ventricle is normal in size with concentric remodeling and low normal systolic function.  · Mild left atrial enlargement.  · Grade I left ventricular diastolic dysfunction.  · The estimated PA systolic pressure is 47 mmHg.  · Normal right ventricular size with normal right ventricular systolic function.  · There is pulmonary hypertension.  · Normal central venous pressure (3 mmHg).  · There is a bileaflet tilting disc mechanical aortic valve present. Prosthetic aortic valve is normal.  · The aortic valve mean gradient is 19 mmHg with a dimensionless index of 0.47.  · The estimated ejection fraction is 50%.  · The mean diastolic gradient across the mitral valve is 11 mmHg at a heart rate of 76 bpm.  · There is mild to moderate mitral stenosis.  · Mild to moderate tricuspid regurgitation.      Results for orders placed during the hospital encounter of 07/07/22    Echo    Interpretation Summary  · The left ventricle is normal in size with moderate concentric hypertrophy and normal systolic function.  · Mild left atrial enlargement.  · The estimated ejection fraction is 60%.  · Grade II left ventricular diastolic dysfunction.  · There is abnormal septal wall motion consistent with post-operative status.  · Normal  right ventricular size with normal right ventricular systolic function.  · Mild right atrial enlargement.  · There is a bileaflet tilting disc mechanical aortic valve present.  · The aortic valve mean gradient is 15 mmHg with a dimensionless index of 0.52.  · The mean diastolic gradient across the mitral valve is 11 mmHg at a heart rate of bpm.  · There is mild to moderate mitral stenosis.  · There is moderate pulmonary hypertension.  · Mild tricuspid regurgitation.  · Intermediate central venous pressure (8 mmHg).  · The estimated PA systolic pressure is 54 mmHg.  · The aortic root is mildly dilated.      RHC  Summary       The estimated blood loss was <50 mL.  Believe PA pressures are elevated in setting of increased filling pressures. Mean PA pressure is right around 34.  Recommend stricter volume management, review of echoes for the last 3 year demonstrates similar findings.  If this is a barrier to renal transplant, can consider adding PDE5, however do not think he will be able to tolerate given increased filling pressure and patient stating he drops pressures with HD.  Will likely need to discuss with abdominal transplant team.     The procedure log was documented by Documenter: Isis Sevilla and verified by Mariah Pierce MD.      Results for orders placed during the hospital encounter of 05/25/21    Nuclear Stress - Cardiology Interpreted    Interpretation Summary    Normal myocardial perfusion scan. There is no evidence of myocardial ischemia or infarction.    The gated perfusion images showed an ejection fraction of 65% at rest. The gated perfusion images showed an ejection fraction of 47% post stress.    The EKG portion of this study is negative for ischemia.    · Intermediate central venous pressure (8 mmHg).       Cath 2/17  1. Coronary angiography.      a.     Left main widely patent.      b.     LAD widely patent.      c.     Ramus widely patent and massive in size.      d.     Circumflex widely  patent with a small OM 1 and OM 2 branch.      e.     Right coronary widely patent.  2. Hemodynamics:  Right heart catheterization.      a.     Pulmonary capillary wedge pressure 33 at end-expiration.      b.     Pulmonary artery pressure 78/42.      c.     Right ventricular pressure 70/21.      d.     Right atrial pressure 22 with a peak V-wave of 24.      e.     Cardiac output by thermodilution is between 6 and 7       L/minute, by Patricia 6.1 L/minute.    CONCLUSION  1. Normal coronary arteries.  2. Pulmonary hypertension.  3. Severe aortic insufficiency.      Past Medical History:   Diagnosis Date    Anemia     Aortic valve stenosis     s/p mechanical AVR    Atrial fibrillation     Atrial flutter     Cardiomyopathy     CHF (congestive heart failure)     Drug-induced erectile dysfunction     ESRD due to hypertension     HD M, W, F    ESRD on dialysis     GERD (gastroesophageal reflux disease)     Hepatitis B     Hyperlipidemia     Hypertension     Nightmares     Obesity     DANIEL (obstructive sleep apnea) 11/12/2019    Secondary hyperparathyroidism of renal origin     Supraventricular tachycardia     atrial tachycardia    Tachycardia     Valvular regurgitation     AI, TR       Past Surgical History:   Procedure Laterality Date    ASD REPAIR      age 8 [facility]    AV Graft Creation Left 03/2017    CARDIAC CATHETERIZATION      [facility]     CARDIAC VALVE SURGERY  02/08/2017    22 mm Medtronic AV, 28 mm TV Medtronic annuloplaty ring    COLONOSCOPY N/A 8/27/2019    Procedure: COLONOSCOPY;  Surgeon: Addie John MD;  Location: [facility];  Service: Endoscopy;  Laterality: N/A;  dialysis pt *needs K*    COLONOSCOPY N/A 9/3/2020    Procedure: COLONOSCOPY-need INR;  Surgeon: Jemma Youngblood MD;  Location: [facility];  Service: Endoscopy;  Laterality: N/A;    ESOPHAGOGASTRODUODENOSCOPY N/A 8/27/2019    Procedure: EGD (ESOPHAGOGASTRODUODENOSCOPY);  Surgeon: Addie John MD;  Location: [facility];  Service:  Endoscopy;  Laterality: N/A;    INSERTION OF INFLATABLE PENILE PROSTHESIS N/A 5/21/2024    Procedure: INSERTION, PENILE PROSTHESIS, INFLATABLE;  Surgeon: Bonifacio Shirley MD;  Location: [facility] OR;  Service: Urology;  Laterality: N/A;    RADIOFREQUENCY ABLATION  03/13/2017    Atrial tachycardia    REMOVAL OF GRAFT Left 7/2/2020    Procedure: REMOVAL, GRAFT;  Surgeon: Sascha Sidhu MD;  Location: [facility] OR;  Service: Peripheral Vascular;  Laterality: Left;    RIGHT HEART CATHETERIZATION Right 8/12/2021    Procedure: INSERTION, CATHETER, RIGHT HEART;  Surgeon: Mariah Pierce MD;  Location: [facility] CATH LAB;  Service: Cardiology;  Laterality: Right;       Social History     Tobacco Use    Smoking status: Never     Passive exposure: Never    Smokeless tobacco: Never   Substance Use Topics    Alcohol use: Yes     Alcohol/week: 14.0 standard drinks of alcohol     Types: 14 Glasses of wine per week     Comment: Drinks 1-2 glasses of red wine - quit in 2016; does have heavy drinking history; started drinking at age 13; was drinking a 12 pack over the weekend and two 24 ounces of beer a day during the week along with a pint of hard alcohol    Drug use: No       Family History   Problem Relation Name Age of Onset    Leukemia Mother      Cancer Mother      Heart attack Father          massive MI at age 67    No Known Problems Sister      No Known Problems Brother      No Known Problems Sister      No Known Problems Sister      Heart failure Paternal Grandmother      Diabetes Paternal Aunt      Hypertension Paternal Aunt      Leukemia Paternal Aunt          CLL    Suicide Paternal Uncle      Anesthesia problems Paternal Uncle      Diabetes Paternal Aunt      Stroke Paternal Aunt      Valvular heart disease Maternal Aunt      Kidney disease Neg Hx      Colon cancer Neg Hx             Review of Systems   Constitutional:  Positive for malaise/fatigue.   HENT: Negative.     Eyes: Negative.    Respiratory:  Negative for shortness of  "breath.    Cardiovascular:  Negative for chest pain and palpitations.   Gastrointestinal: Negative.    Genitourinary:  Negative for hematuria.   Musculoskeletal:  Positive for joint pain.   Skin: Negative.    Neurological: Negative.    Endo/Heme/Allergies: Negative.    Psychiatric/Behavioral: Negative.     All 12 systems otherwise negative.      Wt Readings from Last 3 Encounters:   10/08/24 92 kg (202 lb 13.2 oz)   10/07/24 95 kg (209 lb 7 oz)   09/24/24 90.2 kg (198 lb 13.7 oz)     Temp Readings from Last 3 Encounters:   10/07/24 98 °F (36.7 °C) (Temporal)   09/24/24 98.7 °F (37.1 °C) (Temporal)   08/13/24 97.6 °F (36.4 °C)     BP Readings from Last 3 Encounters:   10/08/24 (!) 150/90   10/07/24 (!) 169/88   09/24/24 120/69     Pulse Readings from Last 3 Encounters:   10/08/24 84   10/07/24 97   09/24/24 84       BP (!) 150/90 (BP Location: Right arm, Patient Position: Sitting)   Pulse 84   Ht 6' 2" (1.88 m)   Wt 92 kg (202 lb 13.2 oz)   SpO2 97%   BMI 26.04 kg/m²      Objective:   Physical Exam  Vitals and nursing note reviewed.   Constitutional:       General: He is not in acute distress.     Appearance: He is well-developed. He is not diaphoretic.   HENT:      Head: Normocephalic and atraumatic.      Nose: Nose normal.   Eyes:      General: No scleral icterus.     Conjunctiva/sclera: Conjunctivae normal.   Neck:      Thyroid: No thyromegaly.      Vascular: No JVD.   Cardiovascular:      Rate and Rhythm: Normal rate. Rhythm irregular.      Heart sounds: S1 normal and S2 normal. Murmur heard.      Midsystolic murmur is present at the upper right sternal border.      No friction rub. No gallop. No S3 or S4 sounds.      Comments: Crisp S2  Pulmonary:      Effort: Pulmonary effort is normal. No respiratory distress.      Breath sounds: Normal breath sounds. No stridor. No wheezing or rales.   Chest:      Chest wall: No tenderness.   Abdominal:      General: Bowel sounds are normal. There is no distension.      " Palpations: Abdomen is soft. There is no mass.      Tenderness: There is no abdominal tenderness. There is no rebound.   Genitourinary:     Comments: Deferred  Musculoskeletal:         General: No tenderness or deformity. Normal range of motion.      Cervical back: Normal range of motion and neck supple.   Lymphadenopathy:      Cervical: No cervical adenopathy.   Skin:     General: Skin is warm and dry.      Coloration: Skin is not pale.      Findings: No erythema or rash.   Neurological:      Mental Status: He is alert and oriented to person, place, and time.      Motor: No abnormal muscle tone.      Coordination: Coordination normal.   Psychiatric:         Behavior: Behavior normal.         Thought Content: Thought content normal.         Judgment: Judgment normal.         Lab Results   Component Value Date     09/24/2024    K 4.4 09/24/2024     09/24/2024    CO2 27 09/24/2024    BUN 69 (H) 09/24/2024    CREATININE 12.3 (H) 09/24/2024    GLU 86 09/24/2024    HGBA1C 4.9 03/12/2024    MG 2.3 08/12/2021    AST 12 09/24/2024    ALT 13 09/24/2024    ALBUMIN 3.4 (L) 09/24/2024    PROT 8.1 09/24/2024    BILITOT 0.5 09/24/2024    WBC 6.23 09/24/2024    HGB 10.1 (L) 09/24/2024    HCT 31.0 (L) 09/24/2024    HCT 25 (L) 02/08/2017     (H) 09/24/2024     09/24/2024    INR 1.8 (A) 09/30/2024    INR 1.7 (H) 09/24/2024    TSH 2.953 09/24/2024    CHOL 248 (H) 03/12/2024    HDL 29 (L) 03/12/2024    LDLCALC 170.0 (H) 03/12/2024    TRIG 245 (H) 03/12/2024     (H) 07/01/2021     Assessment:      1. History of mechanical aortic valve replacement    2. Long term (current) use of anticoagulants    3. On Coumadin for atrial fibrillation    4. Atrial fibrillation, unspecified type    5. H/O mechanical aortic valve replacement    6. SVT (supraventricular tachycardia)    7. Chronic diastolic heart failure    8. Pulmonary hypertension    9. NSVT (nonsustained ventricular tachycardia)    10. S/P AVR (aortic  valve replacement)    11. PVC's (premature ventricular contractions)    12. End-stage renal disease    13. History of radiofrequency ablation for complex right atrial arrhythmia    14. Anticoagulated    15. Aortic atherosclerosis    16. Atrial tachycardia    17. ESRD (end stage renal disease) on dialysis    18. PAF (paroxysmal atrial fibrillation)    19. Pulmonary HTN    20. Essential hypertension    21. Hypertensive cardiomegaly with heart failure            Plan:   1. Chronic Diastolic HF  - cont low salt diet  - cont HD for volume removal    2. S/p mech AVR, with mild to moderate MS, mild to mod TR, pulm HTN   - cont coumadin  - f/u with coumadin clinic here  - f/u with CTS - in [place]  - ECHO in July 2022 with normal bi V function, stable valves AV and MV, mild to mod MS, PASP 54 mmHg  -ECHO 4/2023 with normal bi V function, grade 1 DD, mech AV with mean 19mmHg, mild to mod MS - 11 mmHg. Mild to mod TR, PASP 47 mmHg.   -ECHO 5/2024 with normal bi V function, grade 2 DD, AV valve stable - mechanical. Mild to mod MS - MG 11 mmHg, mild MR, mild to mod TR. Pulm HTN - PASP 70 mmHg.     3. ESRD - dry weight improved  - cont HD  - anemia with PRBCs and EPO - f/u hemeonc  - anemia improved     4. HTN - elevated lately  - cont meds, Dilt 420 mg   - adjusted BP meds - labetelol TID  - restart Hydral and Benicar 40     5. PVCs, SVT s/p RFA , Afib - in NSR now   - cont BB and CCB   - f/u with Dr. De León  - recurrence of his old arrhythmia or could be a new source (AFL, R atriotomy, perimitral).  - discussed ablation.     6. HLD  - cont statin    7. ED  - cont tx PRN  - f/u urology for penile implant surgery   - monitor BP, avoid nitrates     8. Pulm HTN/ DANIEL  - PASP 56 mmHg. - repeat ECHO ordered   -f/u CHF/pulm HTN clinic - may start PDE5  - f/u with Dr. Pierce    9. Chronic hep B   - cont tx per hepatology  - further workup pending for cirrhosis - EGD      Visit today included increased complexity associated  with the care of the episodic problem SVT addressed and managing the longitudinal care of the patient due to the serious and/or complex managed problem(s) .      Thank you for allowing me to participate in this patient's care. Please do not hesitate to contact me with any questions or concerns. Consult note has been forwarded to the referral physician.     Fuad Gudino MD, [facility]  Cardiovascular Disease  [facility], [place]  [phone] (P)

## 2024-10-08 NOTE — PROGRESS NOTES
INR not at goal. Medications, chart, and patient findings reviewed. See calendar for adjustments to dose and follow up plan.  Boost today and increase weekly dose again.  Repeat INR in 2 days to verify INR movement.

## 2024-10-10 ENCOUNTER — ANTI-COAG VISIT (OUTPATIENT)
Dept: CARDIOLOGY | Facility: CLINIC | Age: 54
End: 2024-10-10
Payer: MEDICARE

## 2024-10-10 DIAGNOSIS — Z95.2 HISTORY OF MECHANICAL AORTIC VALVE REPLACEMENT: ICD-10-CM

## 2024-10-10 DIAGNOSIS — I48.91 ON COUMADIN FOR ATRIAL FIBRILLATION: Primary | ICD-10-CM

## 2024-10-10 DIAGNOSIS — Z79.01 ON COUMADIN FOR ATRIAL FIBRILLATION: Primary | ICD-10-CM

## 2024-10-10 LAB
CTP QC/QA: YES
INR PPP: 2.4 (ref 2–3)

## 2024-10-10 PROCEDURE — 99999PBSHW POCT INR: Mod: PBBFAC,,,

## 2024-10-10 PROCEDURE — 85610 PROTHROMBIN TIME: CPT | Mod: PBBFAC

## 2024-10-10 NOTE — PROGRESS NOTES
INR has moved to therapeutic goal at 2.4.  Previous warfarin instructions were verified and followed.  No other changes reported.  Instructions given to take 7.5 mg every Mon, Wed, Fri; and 5 mg all other days.  Keep diet consistent with leafy greens.  Will recheck INR on 10/17, along with another appointment at the [facility].  Dose calendar given and reviewed with patient - confirms understanding.

## 2024-10-17 ENCOUNTER — ANTI-COAG VISIT (OUTPATIENT)
Dept: CARDIOLOGY | Facility: CLINIC | Age: 54
End: 2024-10-17
Payer: MEDICARE

## 2024-10-17 ENCOUNTER — OFFICE VISIT (OUTPATIENT)
Dept: UROLOGY | Facility: CLINIC | Age: 54
End: 2024-10-17
Payer: MEDICARE

## 2024-10-17 VITALS — WEIGHT: 202.81 LBS | HEIGHT: 74 IN | BODY MASS INDEX: 26.03 KG/M2

## 2024-10-17 DIAGNOSIS — B18.1 CHRONIC VIRAL HEPATITIS B WITHOUT DELTA AGENT AND WITHOUT COMA: ICD-10-CM

## 2024-10-17 DIAGNOSIS — Z79.01 LONG TERM (CURRENT) USE OF ANTICOAGULANTS: Primary | ICD-10-CM

## 2024-10-17 DIAGNOSIS — Z95.2 HISTORY OF MECHANICAL AORTIC VALVE REPLACEMENT: ICD-10-CM

## 2024-10-17 DIAGNOSIS — Z79.01 ON COUMADIN FOR ATRIAL FIBRILLATION: ICD-10-CM

## 2024-10-17 DIAGNOSIS — N52.9 ERECTILE DYSFUNCTION, UNSPECIFIED ERECTILE DYSFUNCTION TYPE: ICD-10-CM

## 2024-10-17 DIAGNOSIS — R31.0 GROSS HEMATURIA: Primary | ICD-10-CM

## 2024-10-17 DIAGNOSIS — I48.91 ON COUMADIN FOR ATRIAL FIBRILLATION: ICD-10-CM

## 2024-10-17 DIAGNOSIS — N28.1 RENAL CYST: ICD-10-CM

## 2024-10-17 LAB
CTP QC/QA: YES
INR PPP: 2.1 (ref 2–3)

## 2024-10-17 PROCEDURE — 85610 PROTHROMBIN TIME: CPT | Mod: PBBFAC

## 2024-10-17 PROCEDURE — 99214 OFFICE O/P EST MOD 30 MIN: CPT | Mod: PBBFAC | Performed by: UROLOGY

## 2024-10-17 PROCEDURE — 99999 PR PBB SHADOW E&M-EST. PATIENT-LVL IV: CPT | Mod: PBBFAC,,, | Performed by: UROLOGY

## 2024-10-17 PROCEDURE — 99214 OFFICE O/P EST MOD 30 MIN: CPT | Mod: S$PBB,,, | Performed by: UROLOGY

## 2024-10-17 PROCEDURE — 99999PBSHW POCT INR: Mod: PBBFAC,,,

## 2024-10-17 RX ORDER — TENOFOVIR DISOPROXIL FUMARATE 300 MG/1
300 TABLET, FILM COATED ORAL WEEKLY
Qty: 4 TABLET | Refills: 2 | Status: ACTIVE | OUTPATIENT
Start: 2024-10-17

## 2024-10-17 RX ORDER — OXYCODONE AND ACETAMINOPHEN 5; 325 MG/1; MG/1
1 TABLET ORAL EVERY 4 HOURS PRN
Qty: 15 TABLET | Refills: 0 | Status: SHIPPED | OUTPATIENT
Start: 2024-10-17

## 2024-10-17 NOTE — PROGRESS NOTES
Patient's INR is therapeutic at 2.1. Patient reports he followed previous instructions. Patient reports no changes. Instructions given: Continue warfarin 7.5 mg on Fridays, Mondays and Wednesdays; and 5 mg all other days. Recheck on 10/29/24. Calendar reviewed with patient. Patient verbalizes understanding.

## 2024-10-17 NOTE — PROGRESS NOTES
Chief Complaint:   Encounter Diagnoses   Name Primary?    Gross hematuria Yes    Renal cyst     Erectile dysfunction, unspecified erectile dysfunction type          HPI:   10/17/24- patient is still having some issues functioning his prosthesis, does not produce urine but no hematuria.    2/28/19: 48 yo man voids about once a day since he is a MWF dialysis pt.  Saw a clot and gross hematuria about 2-3 weeks ago on one occasion.  No abd/pelvic pain and no exac/rel factors.  No urolithiasis.  Weak stream.  No  history.  Normal sexual function until recently.  Had a CT Urogram that shows no abnormalities except renal cystic function consistent with ESRD.      Allergies:  Patient has no known allergies.    Medications:  has a current medication list which includes the following prescription(s): albuterol, ascorbic acid (vitamin c), shawna-nataly, calcium acetate(phosphat bind), calcium acetate(phosphat bind), diltiazem, diltiazem, ferrous sulfate, hydralazine, hydroxyzine hcl, labetalol, labetalol, multivitamin with minerals, olmesartan, omega-3 fatty acids-vitamin e, oxycodone-acetaminophen, pantoprazole, pantoprazole, rosuvastatin, tenofovir disoproxil fumarate, tenofovir disoproxil fumarate, vitamin d, warfarin, warfarin, and warfarin, and the following Facility-Administered Medications: 0.9% nacl and sodium chloride 0.9%.    Review of Systems:  General: No fever, chills, fatigability, or weight loss.  Skin: No rashes, itching, or changes in color or texture of skin.  Chest: Denies HAY, cyanosis, wheezing, cough, and sputum production.  Abdomen: Appetite fine. No weight loss. Denies diarrhea, abdominal pain, hematemesis, or blood in stool.  Musculoskeletal: No joint stiffness or swelling. Denies back pain.  : As above.  All other review of systems negative.    PMH:   has a past medical history of Anemia, Aortic valve stenosis, Atrial fibrillation, Atrial flutter, Cardiomyopathy, CHF (congestive heart failure),  Drug-induced erectile dysfunction, ESRD due to hypertension, ESRD on dialysis, GERD (gastroesophageal reflux disease), Hepatitis B, Hyperlipidemia, Hypertension, Nightmares, Obesity, DANIEL (obstructive sleep apnea) (11/12/2019), Secondary hyperparathyroidism of renal origin, Supraventricular tachycardia, Tachycardia, and Valvular regurgitation.    PSH:   has a past surgical history that includes ASD repair; Cardiac valuve replacement (02/08/2017); Radiofrequency ablation (03/13/2017); Cardiac catheterization; AV Graft Creation (Left, 03/2017); Colonoscopy (N/A, 8/27/2019); Esophagogastroduodenoscopy (N/A, 8/27/2019); Removal of graft (Left, 7/2/2020); Colonoscopy (N/A, 9/3/2020); Right heart catheterization (Right, 8/12/2021); and Insertion of inflatable penile prosthesis (N/A, 5/21/2024).    FamHx: family history includes Anesthesia problems in his paternal uncle; Cancer in his mother; Diabetes in his paternal aunt and paternal aunt; Heart attack in his father; Heart failure in his paternal grandmother; Hypertension in his paternal aunt; Leukemia in his mother and paternal aunt; No Known Problems in his brother, sister, sister, and sister; Stroke in his paternal aunt; Suicide in his paternal uncle; Valvular heart disease in his maternal aunt.    SocHx:  reports that he has never smoked. He has never been exposed to tobacco smoke. He has never used smokeless tobacco. He reports current alcohol use of about 14.0 standard drinks of alcohol per week. He reports that he does not use drugs.      Physical Exam:  There were no vitals filed for this visit.    General: A&Ox3, no apparent distress, no deformities  Neck: No masses, normal thyroid  Lungs: normal inspiration, no use of accessory muscles  Heart: normal pulse, no arrhythmias  Abdomen: Soft, NT, ND  Skin: The skin is warm and dry. No jaundice.  Ext: No c/c/e.   : 10/17/24- pump is easily palpated today, myself and the patient were both able to inflate and deflate  prosthesis appropriately.  Tips are positioned within the glans.  6/27/24- significant scar tissue around the pump, can pump the prosthesis would having difficulty palpating the deflate button therefore did not want to cycle to completion, no signs of infection, the incision is well healed.  5/23- Test desc felicita, no abnormalities of epididymus. Normal penile and scrotal skin. Meatus normal.    Labs/Studies:   Cysto negative 7/20  CT stone protocol prosthesis in place with minimal seroma, normal 6/24  CT stone protocol normal 4/24  DARLENE bilateral complex renal cysts 2/22  CT urogram hyperdense cysts, no solid lesions 7/21  PSA 0.41 8/24    Impression/Plan:        1. Gross hematuria- no recurrent hematuria, no history of smoking.  Previous structural evaluation was negative by my partner.  Patient does not void.  PSA stable, follow annually.    2. Renal cyst- appeared to be benign, no further workup warranted.    3. Erectile dysfunction-  IPP  5/21/24    Patient has been reinstructed today, the prosthesis does inflate and deflate appropriately.  Still having a little bit of discomfort and difficulty due to the stiffness of the prosthesis.  He will attempt to use this more frequently to loosen up the prosthesis.  See me as previously scheduled, call with any other issues prior to the next appointment.

## 2024-10-21 DIAGNOSIS — Z95.2 HISTORY OF MECHANICAL AORTIC VALVE REPLACEMENT: Primary | ICD-10-CM

## 2024-10-22 ENCOUNTER — PATIENT MESSAGE (OUTPATIENT)
Dept: ADMINISTRATIVE | Facility: CLINIC | Age: 54
End: 2024-10-22
Payer: MEDICARE

## 2024-10-23 NOTE — PROGRESS NOTES
INR is at goal.  Dr Gudino recommend admission with Heparin gttp for surgery on 5/21/24.  We will check INR prior to admission and make adjustments accordingly.       Male

## 2024-10-28 NOTE — NURSING
05/22/24 1130   Post-Hemodialysis Assessment   Additional Fluid Intake (mL) 1250 mL   Post-Hemodialysis Comments 2 hr of dialysis done.  No Fluid was pulled due to low BP.  Multiple bolus given with HD.  +1250ml. NET.   At 1124, Prbcs started.  Temp. 97.4 ax.  At 1130, Pt complains of chest pain and high hr noted.  150 bpm.  Prbcs stopped.  Temp ax 97.4.  Dr Reis at bedside.  Primary Rn at bedside.  Orders to end tx and reinfuse pt.  Tx ended. Pt reinfused. Dr. Hu updated.  HR . EKG done.  Pt sent to [facility].  Reported to floor rn and and icu Rn. Pt awake and alert.  Chest pain resolved post tx.  Prbcs brought to [facility].        yes

## 2024-10-31 ENCOUNTER — ANTI-COAG VISIT (OUTPATIENT)
Dept: CARDIOLOGY | Facility: CLINIC | Age: 54
End: 2024-10-31
Payer: MEDICARE

## 2024-10-31 DIAGNOSIS — Z79.01 LONG TERM (CURRENT) USE OF ANTICOAGULANTS: Primary | ICD-10-CM

## 2024-10-31 DIAGNOSIS — Z79.01 ON COUMADIN FOR ATRIAL FIBRILLATION: ICD-10-CM

## 2024-10-31 DIAGNOSIS — I48.91 ON COUMADIN FOR ATRIAL FIBRILLATION: ICD-10-CM

## 2024-10-31 DIAGNOSIS — Z95.2 HISTORY OF MECHANICAL AORTIC VALVE REPLACEMENT: ICD-10-CM

## 2024-10-31 LAB
CTP QC/QA: YES
INR PPP: 1.7 (ref 2–3)

## 2024-10-31 PROCEDURE — 99999PBSHW POCT INR: Mod: PBBFAC,,,

## 2024-10-31 PROCEDURE — 85610 PROTHROMBIN TIME: CPT | Mod: PBBFAC

## 2024-11-19 ENCOUNTER — HOSPITAL ENCOUNTER (OUTPATIENT)
Dept: CARDIOLOGY | Facility: HOSPITAL | Age: 54
Discharge: HOME | End: 2024-11-19
Attending: INTERNAL MEDICINE
Payer: MEDICARE

## 2024-11-19 ENCOUNTER — OFFICE VISIT (OUTPATIENT)
Dept: CARDIOLOGY | Facility: CLINIC | Age: 54
End: 2024-11-19
Payer: MEDICARE

## 2024-11-19 VITALS
SYSTOLIC BLOOD PRESSURE: 102 MMHG | WEIGHT: 208.13 LBS | OXYGEN SATURATION: 97 % | BODY MASS INDEX: 26.71 KG/M2 | HEIGHT: 74 IN | DIASTOLIC BLOOD PRESSURE: 80 MMHG | HEART RATE: 105 BPM

## 2024-11-19 DIAGNOSIS — I10 ESSENTIAL HYPERTENSION: ICD-10-CM

## 2024-11-19 DIAGNOSIS — N18.6 ESRD (END STAGE RENAL DISEASE) ON DIALYSIS: ICD-10-CM

## 2024-11-19 DIAGNOSIS — I48.91 ON COUMADIN FOR ATRIAL FIBRILLATION: ICD-10-CM

## 2024-11-19 DIAGNOSIS — N18.6 END-STAGE RENAL DISEASE: ICD-10-CM

## 2024-11-19 DIAGNOSIS — Z79.01 ANTICOAGULATED: ICD-10-CM

## 2024-11-19 DIAGNOSIS — Z79.01 LONG TERM (CURRENT) USE OF ANTICOAGULANTS: ICD-10-CM

## 2024-11-19 DIAGNOSIS — Z79.01 ON COUMADIN FOR ATRIAL FIBRILLATION: ICD-10-CM

## 2024-11-19 DIAGNOSIS — Z95.2 S/P AVR (AORTIC VALVE REPLACEMENT): Primary | ICD-10-CM

## 2024-11-19 DIAGNOSIS — B18.1 CHRONIC VIRAL HEPATITIS B WITHOUT DELTA AGENT AND WITHOUT COMA: ICD-10-CM

## 2024-11-19 DIAGNOSIS — I47.10 SVT (SUPRAVENTRICULAR TACHYCARDIA): ICD-10-CM

## 2024-11-19 DIAGNOSIS — Z95.2 HISTORY OF MECHANICAL AORTIC VALVE REPLACEMENT: ICD-10-CM

## 2024-11-19 DIAGNOSIS — Z99.2 ESRD (END STAGE RENAL DISEASE) ON DIALYSIS: ICD-10-CM

## 2024-11-19 DIAGNOSIS — I47.19 ATRIAL TACHYCARDIA: ICD-10-CM

## 2024-11-19 DIAGNOSIS — Z95.2 H/O AORTIC VALVE REPLACEMENT: ICD-10-CM

## 2024-11-19 DIAGNOSIS — I49.3 PVC'S (PREMATURE VENTRICULAR CONTRACTIONS): ICD-10-CM

## 2024-11-19 DIAGNOSIS — I27.20 PULMONARY HTN: ICD-10-CM

## 2024-11-19 DIAGNOSIS — Z98.890 HISTORY OF RADIOFREQUENCY ABLATION FOR COMPLEX RIGHT ATRIAL ARRHYTHMIA: ICD-10-CM

## 2024-11-19 DIAGNOSIS — I47.29 NSVT (NONSUSTAINED VENTRICULAR TACHYCARDIA): ICD-10-CM

## 2024-11-19 DIAGNOSIS — I48.0 PAF (PAROXYSMAL ATRIAL FIBRILLATION): ICD-10-CM

## 2024-11-19 DIAGNOSIS — I50.32 CHRONIC DIASTOLIC HEART FAILURE: ICD-10-CM

## 2024-11-19 DIAGNOSIS — N52.9 ERECTILE DYSFUNCTION, UNSPECIFIED ERECTILE DYSFUNCTION TYPE: ICD-10-CM

## 2024-11-19 DIAGNOSIS — Z95.2 H/O MECHANICAL AORTIC VALVE REPLACEMENT: ICD-10-CM

## 2024-11-19 DIAGNOSIS — I27.20 PULMONARY HYPERTENSION: ICD-10-CM

## 2024-11-19 DIAGNOSIS — I70.0 AORTIC ATHEROSCLEROSIS: ICD-10-CM

## 2024-11-19 LAB
OHS QRS DURATION: 126 MS
OHS QTC CALCULATION: 520 MS

## 2024-11-19 PROCEDURE — 99999 PR PBB SHADOW E&M-EST. PATIENT-LVL III: CPT | Mod: PBBFAC,,, | Performed by: INTERNAL MEDICINE

## 2024-11-19 PROCEDURE — 99213 OFFICE O/P EST LOW 20 MIN: CPT | Mod: PBBFAC,25 | Performed by: INTERNAL MEDICINE

## 2024-11-19 PROCEDURE — G2211 COMPLEX E/M VISIT ADD ON: HCPCS | Mod: S$PBB,,, | Performed by: INTERNAL MEDICINE

## 2024-11-19 PROCEDURE — 93010 ELECTROCARDIOGRAM REPORT: CPT | Mod: ,,, | Performed by: INTERNAL MEDICINE

## 2024-11-19 PROCEDURE — 99214 OFFICE O/P EST MOD 30 MIN: CPT | Mod: S$PBB,,, | Performed by: INTERNAL MEDICINE

## 2024-11-19 PROCEDURE — 93005 ELECTROCARDIOGRAM TRACING: CPT

## 2024-11-19 RX ORDER — LABETALOL 200 MG/1
TABLET, FILM COATED ORAL
Qty: 90 TABLET | Refills: 6 | Status: SHIPPED | OUTPATIENT
Start: 2024-11-19

## 2024-11-19 RX ORDER — OLMESARTAN MEDOXOMIL 40 MG/1
40 TABLET ORAL NIGHTLY
Qty: 90 TABLET | Refills: 3 | Status: SHIPPED | OUTPATIENT
Start: 2024-11-19 | End: 2025-11-19

## 2024-11-19 RX ORDER — DILTIAZEM HYDROCHLORIDE 420 MG/1
420 CAPSULE, EXTENDED RELEASE ORAL DAILY
Qty: 90 CAPSULE | Refills: 1 | Status: SHIPPED | OUTPATIENT
Start: 2024-11-19 | End: 2025-11-19

## 2024-11-19 RX ORDER — HYDRALAZINE HYDROCHLORIDE 100 MG/1
100 TABLET, FILM COATED ORAL EVERY 8 HOURS
Qty: 270 TABLET | Refills: 1 | Status: SHIPPED | OUTPATIENT
Start: 2024-11-19 | End: 2025-11-19

## 2024-11-19 NOTE — PROGRESS NOTES
Subjective:   Patient ID:  Isidro White Jr. is a 54 y.o. male who presents for cardiac consult of Follow-up          The patient came in today for cardiac consult of Follow-up      Isidro White Jr. is a 54 y.o. male pt with PAF, ASD closure at age 8 ([facility]), HFpEF, s/p AVR with a 22 mm mechanical valve and TV annuloplasty with a 28 mm ring 3/17, HTN, PHTN, Mitral stenosis, SVT, EP performed RFA (3/13/17) and has been on HD - MWF since Feb 8,2016 here for CV follow up.     6/25/24   Hospital Course:  54-year-old gentleman who was admitted preoperatively on May 19th for stabilization.  Nephrology and Cardiology were consulted, Medicine was also consulted to assist with the care.  Patient was initiated on dialysis hospital day 1.  Hospital day 2.  On May 21st patient was taken the operating room for insertion of inflatable penile prosthesis.  Patient initially did well, with no issues.  Unfortunately had some postoperative bleeding around his drain site, patient's most likely lost significant amount of blood and required 3 units transfused over the next 2 days.  Due to his reaction during dialysis postoperative day 1. Patient was taken to the ICU for acute monitoring.  Patient remains in the ICU until 524 when he was deemed stable to transfer back to the floor.  Patient's hemoglobin hematocrit will follow closely, no evidence residual bleeding after the drain was removed and stitched on proximally 05/23/2024.  Hemoglobin hematocrit demonstrates stability though over the evening to his discharge date of 05/26/2024.  Patient has swelling had reduced upon discharge, patient's INR was stable on his home dose of Coumadin.  During hospitalization Nephrology maintain care cystic with dialysis.  Hospital medicine had to monitor his blood pressure and pulse modifying his home medications, please see the chart note.  Either way patient was tolerating p.o. diet and pain medications by postoperative day  number 5 and deemed stable for discharge to home.  Patient will continue another 4 days of Levaquin, pain medication has been given, he has been instructed on how to take his Coumadin until he can returned to his Coumadin clinic next week.  Patient will go back to his usual dialysis clinic tomorrow.     Hosp follow up - was recently at [facility] for SOB and was more anemic    Component Ref Range & Units 1 mo ago  (5/26/24) 1 mo ago  (5/25/24) 1 mo ago  (5/24/24) 1 mo ago  (5/22/24) 1 mo ago  (5/22/24) 1 mo ago  (5/22/24) 1 mo ago  (5/22/24)   WBC 3.90 - 12.70 K/uL 10.88 10.98 10.75   11.77 11.17   RBC 4.60 - 6.20 M/uL 2.61 Low  2.58 Low  2.10 Low    2.23 Low  2.62 Low    Hemoglobin 14.0 - 18.0 g/dL 7.9 Low  7.9 Low  6.8 Low   8.7 Low  7.3 Low  8.5 Low    Hematocrit 40.0 - 54.0 % 24.2 Low  23.9 Low  19.8 Low Panic  CM 26.2 Low   22.2 Low  26.6 Low      He was given 2 units blood at hospital post op and then went to [facility] and was given another unit of blood.   ECHO 5/2024 with normal bi V function, grade 2 DD, AV valve stable - mechanical. Mild to mod MS - MG 11 mmHg, mild MR, mild to mod TR. Pulm HTN - PASP 70 mmHg.     7/23/24  BP and HR stable. Recently went to [facility] for blood transfusion - Hgb was 6.9 did not receive any blood then.   He needs to have EGD/Cscope - will need to be off coumadin and Lovenox bridge.     10/8/24  BP elevated today - 150/90. HR 84. BMI 26  He feels well now, HGB improved.   HE was taken off a lot of BP meds.     11/19/24  BP low normal. . BMI 26 - 208 lbs        Results for orders placed during the hospital encounter of 05/02/24    Echo    Interpretation Summary    Left Ventricle: The left ventricle is normal in size. Normal wall thickness. There is concentric remodeling. Normal wall motion. Ejection fraction by visual approximation is 60%. Grade II diastolic dysfunction.    Right Ventricle: Normal right ventricular cavity size. Wall thickness is normal. Systolic function is normal.    Left  Atrium: Left atrium is severely dilated.    Aortic Valve: There is a tilting disc prosthetic valve in the aortic position.    Mitral Valve: Moderately calcified leaflets. Moderately restricted motion. There is mild to moderate stenosis. MVA 2.6 cm2 by planimetry, 1.6 cm2 by PHT.  The mean pressure gradient across the mitral valve is 11 mmHg at a heart rate of  bpm. There is mild regurgitation.    Tricuspid Valve: There is mild to moderate regurgitation.    Pulmonary Artery: There is pulmonary hypertension. The estimated pulmonary artery systolic pressure is 70 mmHg.    IVC/SVC: Normal venous pressure at 3 mmHg.      Results for orders placed during the hospital encounter of 04/27/23    Echo    Interpretation Summary  · The left ventricle is normal in size with concentric remodeling and low normal systolic function.  · Mild left atrial enlargement.  · Grade I left ventricular diastolic dysfunction.  · The estimated PA systolic pressure is 47 mmHg.  · Normal right ventricular size with normal right ventricular systolic function.  · There is pulmonary hypertension.  · Normal central venous pressure (3 mmHg).  · There is a bileaflet tilting disc mechanical aortic valve present. Prosthetic aortic valve is normal.  · The aortic valve mean gradient is 19 mmHg with a dimensionless index of 0.47.  · The estimated ejection fraction is 50%.  · The mean diastolic gradient across the mitral valve is 11 mmHg at a heart rate of 76 bpm.  · There is mild to moderate mitral stenosis.  · Mild to moderate tricuspid regurgitation.      Results for orders placed during the hospital encounter of 07/07/22    Echo    Interpretation Summary  · The left ventricle is normal in size with moderate concentric hypertrophy and normal systolic function.  · Mild left atrial enlargement.  · The estimated ejection fraction is 60%.  · Grade II left ventricular diastolic dysfunction.  · There is abnormal septal wall motion consistent with post-operative  status.  · Normal right ventricular size with normal right ventricular systolic function.  · Mild right atrial enlargement.  · There is a bileaflet tilting disc mechanical aortic valve present.  · The aortic valve mean gradient is 15 mmHg with a dimensionless index of 0.52.  · The mean diastolic gradient across the mitral valve is 11 mmHg at a heart rate of bpm.  · There is mild to moderate mitral stenosis.  · There is moderate pulmonary hypertension.  · Mild tricuspid regurgitation.  · Intermediate central venous pressure (8 mmHg).  · The estimated PA systolic pressure is 54 mmHg.  · The aortic root is mildly dilated.      RHC  Summary       The estimated blood loss was <50 mL.  Believe PA pressures are elevated in setting of increased filling pressures. Mean PA pressure is right around 34.  Recommend stricter volume management, review of echoes for the last 3 year demonstrates similar findings.  If this is a barrier to renal transplant, can consider adding PDE5, however do not think he will be able to tolerate given increased filling pressure and patient stating he drops pressures with HD.  Will likely need to discuss with abdominal transplant team.     The procedure log was documented by Documenter: Isis Sevilla and verified by Mariah Pierce MD.      Results for orders placed during the hospital encounter of 05/25/21    Nuclear Stress - Cardiology Interpreted    Interpretation Summary    Normal myocardial perfusion scan. There is no evidence of myocardial ischemia or infarction.    The gated perfusion images showed an ejection fraction of 65% at rest. The gated perfusion images showed an ejection fraction of 47% post stress.    The EKG portion of this study is negative for ischemia.    · Intermediate central venous pressure (8 mmHg).       Cath 2/17  1. Coronary angiography.      a.     Left main widely patent.      b.     LAD widely patent.      c.     Ramus widely patent and massive in size.      d.      Circumflex widely patent with a small OM 1 and OM 2 branch.      e.     Right coronary widely patent.  2. Hemodynamics:  Right heart catheterization.      a.     Pulmonary capillary wedge pressure 33 at end-expiration.      b.     Pulmonary artery pressure 78/42.      c.     Right ventricular pressure 70/21.      d.     Right atrial pressure 22 with a peak V-wave of 24.      e.     Cardiac output by thermodilution is between 6 and 7       L/minute, by Patricia 6.1 L/minute.    CONCLUSION  1. Normal coronary arteries.  2. Pulmonary hypertension.  3. Severe aortic insufficiency.      Past Medical History:   Diagnosis Date    Anemia     Aortic valve stenosis     s/p mechanical AVR    Atrial fibrillation     Atrial flutter     Cardiomyopathy     CHF (congestive heart failure)     Drug-induced erectile dysfunction     ESRD due to hypertension     HD M, W, F    ESRD on dialysis     GERD (gastroesophageal reflux disease)     Hepatitis B     Hyperlipidemia     Hypertension     Nightmares     Obesity     DANIEL (obstructive sleep apnea) 11/12/2019    Secondary hyperparathyroidism of renal origin     Supraventricular tachycardia     atrial tachycardia    Tachycardia     Valvular regurgitation     AI, TR       Past Surgical History:   Procedure Laterality Date    ASD REPAIR      age 8 [facility]    AV Graft Creation Left 03/2017    CARDIAC CATHETERIZATION      [facility]     CARDIAC VALVE SURGERY  02/08/2017    22 mm Medtronic AV, 28 mm TV Medtronic annuloplaty ring    COLONOSCOPY N/A 8/27/2019    Procedure: COLONOSCOPY;  Surgeon: Addie John MD;  Location: [facility];  Service: Endoscopy;  Laterality: N/A;  dialysis pt *needs K*    COLONOSCOPY N/A 9/3/2020    Procedure: COLONOSCOPY-need INR;  Surgeon: Jemma Youngblood MD;  Location: [facility];  Service: Endoscopy;  Laterality: N/A;    ESOPHAGOGASTRODUODENOSCOPY N/A 8/27/2019    Procedure: EGD (ESOPHAGOGASTRODUODENOSCOPY);  Surgeon: Addie John MD;  Location: [facility];   Service: Endoscopy;  Laterality: N/A;    INSERTION OF INFLATABLE PENILE PROSTHESIS N/A 5/21/2024    Procedure: INSERTION, PENILE PROSTHESIS, INFLATABLE;  Surgeon: Bonifacio Shirley MD;  Location: [facility] OR;  Service: Urology;  Laterality: N/A;    RADIOFREQUENCY ABLATION  03/13/2017    Atrial tachycardia    REMOVAL OF GRAFT Left 7/2/2020    Procedure: REMOVAL, GRAFT;  Surgeon: Sascha Sidhu MD;  Location: [facility] OR;  Service: Peripheral Vascular;  Laterality: Left;    RIGHT HEART CATHETERIZATION Right 8/12/2021    Procedure: INSERTION, CATHETER, RIGHT HEART;  Surgeon: Mariah Pierce MD;  Location: [facility] CATH LAB;  Service: Cardiology;  Laterality: Right;       Social History     Tobacco Use    Smoking status: Never     Passive exposure: Never    Smokeless tobacco: Never   Substance Use Topics    Alcohol use: Yes     Alcohol/week: 14.0 standard drinks of alcohol     Types: 14 Glasses of wine per week     Comment: Drinks 1-2 glasses of red wine - quit in 2016; does have heavy drinking history; started drinking at age 13; was drinking a 12 pack over the weekend and two 24 ounces of beer a day during the week along with a pint of hard alcohol    Drug use: No       Family History   Problem Relation Name Age of Onset    Leukemia Mother      Cancer Mother      Heart attack Father          massive MI at age 67    No Known Problems Sister      No Known Problems Brother      No Known Problems Sister      No Known Problems Sister      Heart failure Paternal Grandmother      Diabetes Paternal Aunt      Hypertension Paternal Aunt      Leukemia Paternal Aunt          CLL    Suicide Paternal Uncle      Anesthesia problems Paternal Uncle      Diabetes Paternal Aunt      Stroke Paternal Aunt      Valvular heart disease Maternal Aunt      Kidney disease Neg Hx      Colon cancer Neg Hx             Review of Systems   Constitutional:  Positive for malaise/fatigue.   HENT: Negative.     Eyes: Negative.    Respiratory:  Negative for  "shortness of breath.    Cardiovascular:  Negative for chest pain and palpitations.   Gastrointestinal: Negative.    Genitourinary:  Negative for hematuria.   Musculoskeletal:  Positive for joint pain.   Skin: Negative.    Neurological: Negative.    Endo/Heme/Allergies: Negative.    Psychiatric/Behavioral: Negative.     All 12 systems otherwise negative.      Wt Readings from Last 3 Encounters:   11/19/24 94.4 kg (208 lb 1.8 oz)   10/17/24 92 kg (202 lb 13.2 oz)   10/08/24 92 kg (202 lb 13.2 oz)     Temp Readings from Last 3 Encounters:   10/07/24 98 °F (36.7 °C) (Temporal)   09/24/24 98.7 °F (37.1 °C) (Temporal)   08/13/24 97.6 °F (36.4 °C)     BP Readings from Last 3 Encounters:   11/19/24 102/80   10/08/24 (!) 150/90   10/07/24 (!) 169/88     Pulse Readings from Last 3 Encounters:   11/19/24 105   10/08/24 84   10/07/24 97       /80 (BP Location: Left arm, Patient Position: Sitting)   Pulse 105   Ht 6' 2" (1.88 m)   Wt 94.4 kg (208 lb 1.8 oz)   SpO2 97%   BMI 26.72 kg/m²      Objective:   Physical Exam  Vitals and nursing note reviewed.   Constitutional:       General: He is not in acute distress.     Appearance: He is well-developed. He is not diaphoretic.   HENT:      Head: Normocephalic and atraumatic.      Nose: Nose normal.   Eyes:      General: No scleral icterus.     Conjunctiva/sclera: Conjunctivae normal.   Neck:      Thyroid: No thyromegaly.      Vascular: No JVD.   Cardiovascular:      Rate and Rhythm: Normal rate. Rhythm irregular.      Heart sounds: S1 normal and S2 normal. Murmur heard.      Midsystolic murmur is present at the upper right sternal border.      No friction rub. No gallop. No S3 or S4 sounds.      Comments: Crisp S2  Pulmonary:      Effort: Pulmonary effort is normal. No respiratory distress.      Breath sounds: Normal breath sounds. No stridor. No wheezing or rales.   Chest:      Chest wall: No tenderness.   Abdominal:      General: Bowel sounds are normal. There is no " distension.      Palpations: Abdomen is soft. There is no mass.      Tenderness: There is no abdominal tenderness. There is no rebound.   Genitourinary:     Comments: Deferred  Musculoskeletal:         General: No tenderness or deformity. Normal range of motion.      Cervical back: Normal range of motion and neck supple.   Lymphadenopathy:      Cervical: No cervical adenopathy.   Skin:     General: Skin is warm and dry.      Coloration: Skin is not pale.      Findings: No erythema or rash.   Neurological:      Mental Status: He is alert and oriented to person, place, and time.      Motor: No abnormal muscle tone.      Coordination: Coordination normal.   Psychiatric:         Behavior: Behavior normal.         Thought Content: Thought content normal.         Judgment: Judgment normal.         Lab Results   Component Value Date     09/24/2024    K 4.4 09/24/2024     09/24/2024    CO2 27 09/24/2024    BUN 69 (H) 09/24/2024    CREATININE 12.3 (H) 09/24/2024    GLU 86 09/24/2024    HGBA1C 4.9 03/12/2024    MG 2.3 08/12/2021    AST 12 09/24/2024    ALT 13 09/24/2024    ALBUMIN 3.4 (L) 09/24/2024    PROT 8.1 09/24/2024    BILITOT 0.5 09/24/2024    WBC 6.23 09/24/2024    HGB 10.1 (L) 09/24/2024    HCT 31.0 (L) 09/24/2024    HCT 25 (L) 02/08/2017     (H) 09/24/2024     09/24/2024    INR 1.7 (A) 10/31/2024    INR 1.7 (H) 09/24/2024    TSH 2.953 09/24/2024    CHOL 248 (H) 03/12/2024    HDL 29 (L) 03/12/2024    LDLCALC 170.0 (H) 03/12/2024    TRIG 245 (H) 03/12/2024     (H) 07/01/2021     Assessment:      1. S/P AVR (aortic valve replacement)    2. SVT (supraventricular tachycardia)    3. Chronic diastolic heart failure    4. On Coumadin for atrial fibrillation    5. History of mechanical aortic valve replacement    6. Long term (current) use of anticoagulants    7. H/O mechanical aortic valve replacement    8. Pulmonary hypertension    9. NSVT (nonsustained ventricular tachycardia)    10. PVC's  (premature ventricular contractions)    11. History of radiofrequency ablation for complex right atrial arrhythmia    12. Anticoagulated    13. End-stage renal disease    14. Aortic atherosclerosis    15. PAF (paroxysmal atrial fibrillation)    16. Atrial tachycardia    17. Pulmonary HTN    18. Essential hypertension    19. ESRD (end stage renal disease) on dialysis    20. Erectile dysfunction, unspecified erectile dysfunction type    21. H/O aortic valve replacement    22. Chronic viral hepatitis B without delta agent and without coma              Plan:   1. Chronic Diastolic HF  - cont low salt diet  - cont HD for volume removal    2. S/p mech AVR, with mild to moderate MS, mild to mod TR, pulm HTN   - cont coumadin  - f/u with coumadin clinic here  - f/u with CTS - in [place] Dr. Gastelum  - ECHO in July 2022 with normal bi V function, stable valves AV and MV, mild to mod MS, PASP 54 mmHg  -ECHO 4/2023 with normal bi V function, grade 1 DD, mech AV with mean 19mmHg, mild to mod MS - 11 mmHg. Mild to mod TR, PASP 47 mmHg.   -ECHO 5/2024 with normal bi V function, grade 2 DD, AV valve stable - mechanical. Mild to mod MS - MG 11 mmHg, mild MR, mild to mod TR. Pulm HTN - PASP 70 mmHg.     3. ESRD - dry weight improved  - cont HD  - anemia with PRBCs and EPO - f/u hemeonc  - anemia improved     4. HTN -  - cont meds, Dilt 420 mg   - adjusted BP meds - labetelol TID - do not take in AM before HD   - restart Hydral and Benicar 40     5. PVCs, SVT s/p RFA , Afib - in NSR now   - cont BB and CCB   - f/u with Dr. De León  - recurrence of his old arrhythmia or could be a new source (AFL, R atriotomy, perimitral).  - discussed ablation.     6. HLD  - cont statin    7. ED  - cont tx PRN  - f/u urology for penile implant surgery   - monitor BP, avoid nitrates     8. Pulm HTN/ DANIEL  - PASP 56 mmHg. - ..> 70   -f/u CHF/pulm HTN clinic - may start PDE5  - f/u with Dr. Pierce    9. Chronic hep B   - cont tx per hepatology  - further  workup pending for cirrhosis - EGD      Visit today included increased complexity associated with the care of the episodic problem SVT addressed and managing the longitudinal care of the patient due to the serious and/or complex managed problem(s) .      Thank you for allowing me to participate in this patient's care. Please do not hesitate to contact me with any questions or concerns. Consult note has been forwarded to the referral physician.     Fuad Gudino MD, [facility]  Cardiovascular Disease  [facility], [place]  [phone] (P)

## 2024-11-26 ENCOUNTER — TELEPHONE (OUTPATIENT)
Dept: CARDIOLOGY | Facility: CLINIC | Age: 54
End: 2024-11-26

## 2024-11-26 ENCOUNTER — ANTI-COAG VISIT (OUTPATIENT)
Dept: CARDIOLOGY | Facility: CLINIC | Age: 54
End: 2024-11-26
Payer: MEDICARE

## 2024-11-26 DIAGNOSIS — I48.91 ON COUMADIN FOR ATRIAL FIBRILLATION: ICD-10-CM

## 2024-11-26 DIAGNOSIS — Z79.01 ON COUMADIN FOR ATRIAL FIBRILLATION: ICD-10-CM

## 2024-11-26 DIAGNOSIS — Z95.2 HISTORY OF MECHANICAL AORTIC VALVE REPLACEMENT: ICD-10-CM

## 2024-11-26 DIAGNOSIS — Z79.01 LONG TERM (CURRENT) USE OF ANTICOAGULANTS: Primary | ICD-10-CM

## 2024-11-26 LAB
CTP QC/QA: YES
INR PPP: 1.6 (ref 2–3)

## 2024-11-26 PROCEDURE — 99999PBSHW POCT INR: Mod: PBBFAC,,,

## 2024-11-26 PROCEDURE — 85610 PROTHROMBIN TIME: CPT | Mod: PBBFAC

## 2024-11-26 NOTE — TELEPHONE ENCOUNTER
Patient contacted and advised of updated dosing/instructions per encounter 11/26/24. Patient has been advised to make the changes on his warfarin dosing calendar. Patient repeated back correctly and verbalizes understanding.

## 2024-11-26 NOTE — PROGRESS NOTES
Patient's INR is sub-therapeutic at 1.6. Patient confirms he followed previous instructions. Patient reports he ate more dark leafy greens than usual and nuts. Re-educated patient of Coumadin Diet and need to keep it consistent. Advised patient of increased risk of clotting; signs/symptoms of clotting and need to go to ED if he experiences any. Patient reports he is not having any signs/symptoms of clotting. Instructions given: Will give boosted dose of warfarin 10 mg today 11/26/24; then resume warfarin 7.5 mg on Wednesdays, Fridays and Mondays; and 5 mg all other days. Recheck on 12/10/24. Calendar reviewed with patient. Patient verbalizes understanding.

## 2024-11-26 NOTE — PROGRESS NOTES
INR not at goal. Medications, chart, and patient findings reviewed. See calendar for adjustments to dose and follow up plan.  Boost x 2 days and repeat INR in 1 week, not 2 - pt with AVR.

## 2024-12-03 ENCOUNTER — ANTI-COAG VISIT (OUTPATIENT)
Dept: CARDIOLOGY | Facility: CLINIC | Age: 54
End: 2024-12-03
Payer: MEDICARE

## 2024-12-03 DIAGNOSIS — I48.91 ON COUMADIN FOR ATRIAL FIBRILLATION: ICD-10-CM

## 2024-12-03 DIAGNOSIS — Z79.01 ON COUMADIN FOR ATRIAL FIBRILLATION: ICD-10-CM

## 2024-12-03 DIAGNOSIS — Z79.01 LONG TERM (CURRENT) USE OF ANTICOAGULANTS: Primary | ICD-10-CM

## 2024-12-03 DIAGNOSIS — Z95.2 HISTORY OF MECHANICAL AORTIC VALVE REPLACEMENT: ICD-10-CM

## 2024-12-03 LAB
CTP QC/QA: YES
INR PPP: 2.7 (ref 2–3)

## 2024-12-03 PROCEDURE — 99999PBSHW POCT INR: Mod: PBBFAC,,,

## 2024-12-03 PROCEDURE — 85610 PROTHROMBIN TIME: CPT | Mod: PBBFAC

## 2024-12-03 PROCEDURE — 93793 ANTICOAG MGMT PT WARFARIN: CPT | Mod: ,,,

## 2024-12-03 NOTE — PROGRESS NOTES
Patient's INR is therapeutic at 2.7. Patient reports he followed previous instructions. Patient reports no changes. Instructions given: Continue warfarin 7.5 mg on Wednesdays, Fridays and Mondays; and 5 mg all other days. Recheck on 12/17/24. Calendar reviewed with patient. Patient verbalizes understanding.

## 2024-12-11 ENCOUNTER — ANTI-COAG VISIT (OUTPATIENT)
Dept: CARDIOLOGY | Facility: CLINIC | Age: 54
End: 2024-12-11
Payer: MEDICARE

## 2024-12-11 DIAGNOSIS — I48.91 ON COUMADIN FOR ATRIAL FIBRILLATION: ICD-10-CM

## 2024-12-11 DIAGNOSIS — Z79.01 ON COUMADIN FOR ATRIAL FIBRILLATION: ICD-10-CM

## 2024-12-11 DIAGNOSIS — Z79.01 LONG TERM (CURRENT) USE OF ANTICOAGULANTS: Primary | ICD-10-CM

## 2024-12-11 DIAGNOSIS — Z95.2 HISTORY OF MECHANICAL AORTIC VALVE REPLACEMENT: ICD-10-CM

## 2024-12-11 LAB
CTP QC/QA: YES
INR PPP: 1.9 (ref 2–3)

## 2024-12-11 PROCEDURE — 99999PBSHW POCT INR: Mod: PBBFAC,,,

## 2024-12-11 PROCEDURE — 85610 PROTHROMBIN TIME: CPT | Mod: PBBFAC

## 2024-12-11 PROCEDURE — 93793 ANTICOAG MGMT PT WARFARIN: CPT | Mod: ,,,

## 2024-12-11 NOTE — PROGRESS NOTES
Patient's INR is slightly sub-therapeutic at 1.9. Patient came in today and reports he could not have dialysis today because of excessive bleeding from dialysis shunt. Patient confirms he followed previous instructions. Advised of increased risk of clotting; signs/symptoms of clotting and need to go to ED if he experiences any. Patient reports he is not having any signs/symptoms of clotting. Sent to PharmD for dosing/instructions.

## 2024-12-11 NOTE — PROGRESS NOTES
INR is below goal.  No boost as he is actively bleeding from shunt.  Recommend ER to contain and control bleeding.

## 2024-12-18 PROBLEM — I95.81 POSTPROCEDURAL HYPOTENSION: Status: RESOLVED | Noted: 2017-02-09 | Resolved: 2024-12-18

## 2024-12-18 PROBLEM — I47.10 SVT (SUPRAVENTRICULAR TACHYCARDIA): Status: ACTIVE | Noted: 2024-12-18

## 2024-12-18 PROBLEM — R31.0 GROSS HEMATURIA: Status: RESOLVED | Noted: 2023-04-27 | Resolved: 2024-12-18

## 2024-12-18 PROBLEM — T82.7XXA ARTERIOVENOUS GRAFT INFECTION: Status: RESOLVED | Noted: 2020-07-01 | Resolved: 2024-12-18

## 2024-12-18 PROBLEM — Z12.12 ENCOUNTER FOR COLORECTAL CANCER SCREENING: Status: RESOLVED | Noted: 2019-08-22 | Resolved: 2024-12-18

## 2024-12-18 PROBLEM — I48.0 PAROXYSMAL ATRIAL FIBRILLATION: Status: ACTIVE | Noted: 2019-03-27

## 2024-12-18 PROBLEM — K76.6 PORTAL HYPERTENSION: Status: ACTIVE | Noted: 2024-12-18

## 2024-12-18 PROBLEM — Z12.11 COLON CANCER SCREENING: Status: RESOLVED | Noted: 2019-08-26 | Resolved: 2024-12-18

## 2024-12-18 PROBLEM — T82.838A BLEEDING FROM DIALYSIS SHUNT: Status: RESOLVED | Noted: 2020-07-02 | Resolved: 2024-12-18

## 2024-12-18 PROBLEM — Z12.11 ENCOUNTER FOR COLORECTAL CANCER SCREENING: Status: RESOLVED | Noted: 2019-08-22 | Resolved: 2024-12-18

## 2024-12-19 ENCOUNTER — TELEPHONE (OUTPATIENT)
Dept: PHARMACY | Facility: CLINIC | Age: 54
End: 2024-12-19
Payer: MEDICARE

## 2024-12-19 ENCOUNTER — ANTI-COAG VISIT (OUTPATIENT)
Dept: CARDIOLOGY | Facility: CLINIC | Age: 54
End: 2024-12-19
Payer: MEDICARE

## 2024-12-19 ENCOUNTER — OFFICE VISIT (OUTPATIENT)
Dept: INTERNAL MEDICINE | Facility: CLINIC | Age: 54
End: 2024-12-19
Payer: MEDICARE

## 2024-12-19 VITALS
RESPIRATION RATE: 20 BRPM | HEART RATE: 104 BPM | BODY MASS INDEX: 26.51 KG/M2 | WEIGHT: 206.56 LBS | DIASTOLIC BLOOD PRESSURE: 76 MMHG | SYSTOLIC BLOOD PRESSURE: 106 MMHG | HEIGHT: 74 IN

## 2024-12-19 DIAGNOSIS — I50.32 CHRONIC DIASTOLIC HEART FAILURE: ICD-10-CM

## 2024-12-19 DIAGNOSIS — B18.1 CHRONIC HEPATITIS B: ICD-10-CM

## 2024-12-19 DIAGNOSIS — I10 ESSENTIAL HYPERTENSION: ICD-10-CM

## 2024-12-19 DIAGNOSIS — N52.9 ERECTILE DYSFUNCTION, UNSPECIFIED ERECTILE DYSFUNCTION TYPE: ICD-10-CM

## 2024-12-19 DIAGNOSIS — N28.1 RENAL CYST: ICD-10-CM

## 2024-12-19 DIAGNOSIS — I11.0 HYPERTENSIVE CARDIOMEGALY WITH HEART FAILURE: ICD-10-CM

## 2024-12-19 DIAGNOSIS — J98.4 MIXED RESTRICTIVE AND OBSTRUCTIVE LUNG DISEASE: ICD-10-CM

## 2024-12-19 DIAGNOSIS — Z72.821 INADEQUATE SLEEP HYGIENE: ICD-10-CM

## 2024-12-19 DIAGNOSIS — R74.01 TRANSAMINITIS: ICD-10-CM

## 2024-12-19 DIAGNOSIS — G47.36 NOCTURNAL HYPOXEMIA DUE TO OBSTRUCTIVE CHRONIC BRONCHITIS: ICD-10-CM

## 2024-12-19 DIAGNOSIS — Z99.2 ESRD (END STAGE RENAL DISEASE) ON DIALYSIS: Chronic | ICD-10-CM

## 2024-12-19 DIAGNOSIS — I70.0 AORTIC ATHEROSCLEROSIS: ICD-10-CM

## 2024-12-19 DIAGNOSIS — G47.33 OSA (OBSTRUCTIVE SLEEP APNEA): ICD-10-CM

## 2024-12-19 DIAGNOSIS — K76.6 PORTAL HYPERTENSION: ICD-10-CM

## 2024-12-19 DIAGNOSIS — Z98.890 HISTORY OF RADIOFREQUENCY ABLATION FOR COMPLEX RIGHT ATRIAL ARRHYTHMIA: ICD-10-CM

## 2024-12-19 DIAGNOSIS — N18.6 ESRD (END STAGE RENAL DISEASE) ON DIALYSIS: Chronic | ICD-10-CM

## 2024-12-19 DIAGNOSIS — Z79.01 ON COUMADIN FOR ATRIAL FIBRILLATION: ICD-10-CM

## 2024-12-19 DIAGNOSIS — J43.9 MIXED RESTRICTIVE AND OBSTRUCTIVE LUNG DISEASE: ICD-10-CM

## 2024-12-19 DIAGNOSIS — I48.0 PAROXYSMAL ATRIAL FIBRILLATION: ICD-10-CM

## 2024-12-19 DIAGNOSIS — Q24.9 ADULT CONGENITAL HEART DISEASE: ICD-10-CM

## 2024-12-19 DIAGNOSIS — E78.5 HYPERLIPIDEMIA, UNSPECIFIED HYPERLIPIDEMIA TYPE: ICD-10-CM

## 2024-12-19 DIAGNOSIS — Z00.00 ENCOUNTER FOR MEDICARE ANNUAL WELLNESS EXAM: Primary | ICD-10-CM

## 2024-12-19 DIAGNOSIS — Z95.2 HISTORY OF MECHANICAL AORTIC VALVE REPLACEMENT: ICD-10-CM

## 2024-12-19 DIAGNOSIS — Z79.01 LONG TERM (CURRENT) USE OF ANTICOAGULANTS: Primary | ICD-10-CM

## 2024-12-19 DIAGNOSIS — R79.89 LOW VITAMIN D LEVEL: ICD-10-CM

## 2024-12-19 DIAGNOSIS — R91.1 PULMONARY NODULE, LEFT: ICD-10-CM

## 2024-12-19 DIAGNOSIS — J44.89 NOCTURNAL HYPOXEMIA DUE TO OBSTRUCTIVE CHRONIC BRONCHITIS: ICD-10-CM

## 2024-12-19 DIAGNOSIS — D69.6 THROMBOCYTOPENIA: ICD-10-CM

## 2024-12-19 DIAGNOSIS — Z86.0100 HISTORY OF COLON POLYPS: ICD-10-CM

## 2024-12-19 DIAGNOSIS — I47.29 NSVT (NONSUSTAINED VENTRICULAR TACHYCARDIA): ICD-10-CM

## 2024-12-19 DIAGNOSIS — N25.81 SECONDARY HYPERPARATHYROIDISM OF RENAL ORIGIN: ICD-10-CM

## 2024-12-19 DIAGNOSIS — I48.91 ON COUMADIN FOR ATRIAL FIBRILLATION: ICD-10-CM

## 2024-12-19 DIAGNOSIS — E87.8 DISORDER OF ELECTROLYTES: ICD-10-CM

## 2024-12-19 DIAGNOSIS — F51.04 PSYCHOPHYSIOLOGICAL INSOMNIA: ICD-10-CM

## 2024-12-19 DIAGNOSIS — G47.61 PERIODIC LIMB MOVEMENT DISORDER (PLMD): ICD-10-CM

## 2024-12-19 DIAGNOSIS — I27.20 PULMONARY HTN: ICD-10-CM

## 2024-12-19 DIAGNOSIS — I47.10 SVT (SUPRAVENTRICULAR TACHYCARDIA): ICD-10-CM

## 2024-12-19 DIAGNOSIS — N18.9 ANEMIA ASSOCIATED WITH CHRONIC RENAL FAILURE: ICD-10-CM

## 2024-12-19 DIAGNOSIS — R06.83 PRIMARY SNORING: ICD-10-CM

## 2024-12-19 DIAGNOSIS — Z00.00 ENCOUNTER FOR PREVENTIVE HEALTH EXAMINATION: ICD-10-CM

## 2024-12-19 DIAGNOSIS — K21.9 GASTROESOPHAGEAL REFLUX DISEASE, UNSPECIFIED WHETHER ESOPHAGITIS PRESENT: ICD-10-CM

## 2024-12-19 DIAGNOSIS — K74.60 HEPATIC CIRRHOSIS, UNSPECIFIED HEPATIC CIRRHOSIS TYPE, UNSPECIFIED WHETHER ASCITES PRESENT: ICD-10-CM

## 2024-12-19 DIAGNOSIS — D63.1 ANEMIA ASSOCIATED WITH CHRONIC RENAL FAILURE: ICD-10-CM

## 2024-12-19 DIAGNOSIS — I47.19 ATRIAL TACHYCARDIA: ICD-10-CM

## 2024-12-19 DIAGNOSIS — I27.9 CHRONIC PULMONARY HEART DISEASE: ICD-10-CM

## 2024-12-19 LAB
CTP QC/QA: YES
INR PPP: 1.8 (ref 2–3)

## 2024-12-19 PROCEDURE — 85610 PROTHROMBIN TIME: CPT | Mod: PBBFAC

## 2024-12-19 PROCEDURE — 99999 PR PBB SHADOW E&M-EST. PATIENT-LVL V: CPT | Mod: PBBFAC,,, | Performed by: NURSE PRACTITIONER

## 2024-12-19 PROCEDURE — 99999PBSHW POCT INR: Mod: PBBFAC,,,

## 2024-12-19 PROCEDURE — 99215 OFFICE O/P EST HI 40 MIN: CPT | Mod: PBBFAC | Performed by: NURSE PRACTITIONER

## 2024-12-19 NOTE — PROGRESS NOTES
Patient's INR is slightly sub-therapeutic at 1.8. Patient reports he has not had any bleeding issues since last visit. Patient reports he ate an increased amount of greens than usual. Patient confirms he followed previous instructions. Advised of increased risk of clotting; signs/symptoms of clotting and need to go to ED if he experiences any. Patient reports he is not having any signs/symptoms of clotting. Instructions given: Will give boosted dose of warfarin 7.5 mg today 12/19/24; then resume warfarin 7.5 mg on Fridays,Mondays and Wednesdays; and 5 mg all other days. Recheck on 1/2/25. Calendar reviewed with patient. Patient verbalizes understanding.

## 2024-12-19 NOTE — PROGRESS NOTES
"  Isidro White presented for a  Medicare AWV and comprehensive Health Risk Assessment today. The following components were reviewed and updated:    Medical history  Family History  Social history  Allergies and Current Medications  Health Risk Assessment  Health Maintenance  Care Team         ** See Completed Assessments for Annual Wellness Visit within the encounter summary.**         The following assessments were completed:  Living Situation  CAGE  Depression Screening  Timed Get Up and Go  Whisper Test  Cognitive Function Screening  Nutrition Screening  ADL Screening  PAQ Screening      Opioid documentation:      Patient does not have a current opioid prescription.        Vitals:    12/19/24 1128   BP: 106/76   BP Location: Right arm   Patient Position: Sitting   Pulse: 104   Resp: 20   Weight: 93.7 kg (206 lb 9.1 oz)   Height:    Pain scale 6' 2" (1.88 m)    0     Body mass index is 26.52 kg/m².  Physical Exam  Constitutional:       General: He is not in acute distress.     Appearance: He is not ill-appearing or diaphoretic.   HENT:      Mouth/Throat:      Mouth: Mucous membranes are moist.   Eyes:      General:         Right eye: No discharge.         Left eye: No discharge.      Conjunctiva/sclera: Conjunctivae normal.   Cardiovascular:      Rate and Rhythm: Normal rate and regular rhythm.   Pulmonary:      Effort: Pulmonary effort is normal. No respiratory distress.      Breath sounds: Normal breath sounds.   Skin:     General: Skin is warm and dry.   Neurological:      Mental Status: He is alert and oriented to person, place, and time. Mental status is at baseline.   Psychiatric:         Mood and Affect: Mood normal.         Behavior: Behavior normal.         Thought Content: Thought content normal.         Judgment: Judgment normal.     Diagnoses and health risks identified today and associated recommendations/orders:    1. Encounter for Medicare annual wellness exam,  Encounter for preventive health " examination  Review for opioid screening: Patient does not have Rx for Opioids  Review for substance use disorder: Patient does not use substance per chart    Patient states he drinks alcohol- About 2-3 drinks a month    I offered to discuss advanced care planning, including how to pick a person who would make decisions for you if you were unable to make them for yourself, called a health care power of , and what kind of decisions you might make such as use of life sustaining treatments such as ventilators and tube feeding when faced with a life limiting illness recorded on a living will that they will need to know. (How you want to be cared for as you near the end of your natural life)     X Patient is interested in learning more about how to make advanced directives.  I provided them paperwork and offered to discuss this with them.  - Ambulatory referral/consult to Pharmacy Assistance; Future- Renavite    2. Chronic pulmonary heart disease, Pulmonary HTN, Mixed restrictive and obstructive lung disease, Nocturnal hypoxemia due to obstructive chronic bronchitis,  Pulmonary nodule, left, DANIEL (obstructive sleep apnea), Primary snoring,  Psychophysiological insomnia  Monitor  Follow up with Pulmonology as directed    3. ESRD (end stage renal disease) on dialysis Q M,W,F, Secondary hyperparathyroidism of renal origin,  Anemia associated with chronic renal failure, Disorder of electrolytes  Monitor  Patient states he goes to HD q M,W,F  Patient states he no longer voids  Follow up with Nephrology as directed   Continue home renavite, phoslo, atarax, ferrous sulfate, vitamin C    4. Portal hypertension, Hepatic cirrhosis, unspecified hepatic cirrhosis type, unspecified whether ascites present, Chronic hepatitis B, Transaminitis  Monitor  Follow up with Hepatology as directed   Continue home viread    5. Thrombocytopenia  Monitor  Follow up as directed      6. Chronic diastolic heart failure  Monitor  Follow up  with Cardiology, Nephrology as directed      7. SVT (supraventricular tachycardia), Hx NSVT (nonsustained ventricular tachycardia)  Monitor  Follow up with Cardiology as directed   Continue home diltiazem    8. Aortic atherosclerosis, Hyperlipidemia, unspecified hyperlipidemia type  Monitor  Follow up with Cardiology   Blood pressure control  Continue home fish oil, crestor    9. Paroxysmal atrial fibrillation, On Coumadin for atrial fibrillation,  Hx Atrial tachycardia, History of radiofrequency ablation for complex right atrial arrhythmia, History of mechanical aortic valve replacement  Monitor  Follow up with Cardiology   Continue home coumadin, diltiazem    10. Adult congenital heart disease, Hypertensive cardiomegaly with heart failure, Essential hypertension   Monitor  Follow up with Cardiology as directed   Continue home hydralazine, benicar    11.  Inadequate sleep hygiene, Periodic limb movement disorder (PLMD)  Monitor  Follow up as needed, directed      12. Gastroesophageal reflux disease, unspecified whether esophagitis present, History of colon polyps  Monitor  Follow up with GI as directed  Continue home protonix    13.  Low vitamin D level  Monitor  Follow up as directed   Continue home MVI    14.  Erectile dysfunction, unspecified erectile dysfunction type, S/p penile implant, Renal cyst   Monitor  Patient reports recent penile implant  Follow up as directed                                                       Provided Isidro with a 5-10 year written screening schedule and personal prevention plan. Recommendations were developed using the USPSTF age appropriate recommendations. Education, counseling, and referrals were provided as needed. After Visit Summary printed and given to patient which includes a list of additional screenings\tests needed.    Follow up in about 1 year (around 12/19/2025) for Annual wellness visit.    VERENA Luke

## 2024-12-19 NOTE — PROGRESS NOTES
Patient has been advised of re-dose instructions. Patient repeated back correctly and verbalizes understanding. Appointment has been scheduled to reflect new return date.

## 2024-12-19 NOTE — PATIENT INSTRUCTIONS
Counseling and Referral of Other Preventative  (Italic type indicates deductible and co-insurance are waived)    Patient Name: Isidro White  Today's Date: 12/19/2024    Health Maintenance       Date Due Completion Date    Shingles Vaccine (1 of 2) Never done ---    Influenza Vaccine (1) 09/01/2024 10/22/2021    COVID-19 Vaccine (5 - 2024-25 season) 09/01/2024 1/17/2023    Colorectal Cancer Screening 09/03/2025 9/3/2020    Hemoglobin A1c (Diabetic Prevention Screening) 03/12/2027 3/12/2024    TETANUS VACCINE 01/24/2029 1/24/2019    Lipid Panel 03/12/2029 3/12/2024    Pneumococcal Vaccines (Age 0-64) (3 of 3 - PPSV23 or PCV20) 01/12/2036 11/16/2020    RSV Vaccine (Age 60+ and Pregnant patients) (1 - 1-dose 75+ series) 01/12/2046 ---        Orders Placed This Encounter   Procedures    Ambulatory referral/consult to Pharmacy Assistance       The following information is provided to all patients.  This information is to help you find resources for any of the problems found today that may be affecting your health:                  Living healthy guide: www.[facility].[place].gov      Understanding Diabetes: www.diabetes.org      Eating healthy: www.cdc.gov/healthyweight      CDC home safety checklist: www.cdc.gov/steadi/patient.html      Agency on Aging: www.goea.[place].[facility]      Alcoholics anonymous (AA): www.aa.org      Physical Activity: www.liban.nih.gov/eh1regl      Tobacco use: www.quitwithusla.org

## 2024-12-19 NOTE — TELEPHONE ENCOUNTER
Duplicate referral. Last referral received 12/12/23  Unfortunately, The Pharmacy Patient Assistance Team is unable to assist Mr. White at this time due to the following reasons      There is no Manufacture or Co-pay Savings Programs available for requested medication.            Pharmacy Patient Assistance Team                Any follow-up questions or concerns may be directed to Mr. White's  assigned advocate Morgan Nascimento.     Thank You  Pharmacy Patient Assistance Team

## 2024-12-26 ENCOUNTER — ANTI-COAG VISIT (OUTPATIENT)
Dept: CARDIOLOGY | Facility: CLINIC | Age: 54
End: 2024-12-26
Payer: MEDICARE

## 2024-12-26 DIAGNOSIS — Z79.01 LONG TERM (CURRENT) USE OF ANTICOAGULANTS: Primary | ICD-10-CM

## 2024-12-26 DIAGNOSIS — I48.91 ON COUMADIN FOR ATRIAL FIBRILLATION: ICD-10-CM

## 2024-12-26 DIAGNOSIS — I48.0 PAROXYSMAL ATRIAL FIBRILLATION: ICD-10-CM

## 2024-12-26 DIAGNOSIS — Z79.01 ON COUMADIN FOR ATRIAL FIBRILLATION: ICD-10-CM

## 2024-12-26 DIAGNOSIS — Z95.2 HISTORY OF MECHANICAL AORTIC VALVE REPLACEMENT: ICD-10-CM

## 2024-12-26 LAB
CTP QC/QA: YES
INR PPP: 2 (ref 2–3)

## 2024-12-26 PROCEDURE — 85610 PROTHROMBIN TIME: CPT | Mod: PBBFAC

## 2024-12-26 PROCEDURE — 99999PBSHW POCT INR: Mod: PBBFAC,,,

## 2024-12-26 NOTE — PROGRESS NOTES
Patient's INR is therapeutic at 2.0. Patient reports he followed previous instructions. Patient reports no changes. Instructions given: Continue warfarin 5 mg on Thursdays, Saturdays and Tuesdays; and 7.5 mg all other days. Recheck on 1/9/25. Calendar reviewed with patient. Patient verbalizes understanding.

## 2025-01-09 ENCOUNTER — ANTI-COAG VISIT (OUTPATIENT)
Dept: CARDIOLOGY | Facility: CLINIC | Age: 55
End: 2025-01-09
Payer: MEDICARE

## 2025-01-09 DIAGNOSIS — Z79.01 LONG TERM (CURRENT) USE OF ANTICOAGULANTS: Primary | ICD-10-CM

## 2025-01-09 DIAGNOSIS — I48.91 ON COUMADIN FOR ATRIAL FIBRILLATION: ICD-10-CM

## 2025-01-09 DIAGNOSIS — Z79.01 ON COUMADIN FOR ATRIAL FIBRILLATION: ICD-10-CM

## 2025-01-09 DIAGNOSIS — Z95.2 HISTORY OF MECHANICAL AORTIC VALVE REPLACEMENT: ICD-10-CM

## 2025-01-09 DIAGNOSIS — I48.0 PAROXYSMAL ATRIAL FIBRILLATION: ICD-10-CM

## 2025-01-09 LAB
CTP QC/QA: YES
INR PPP: 1.6 (ref 2–3)

## 2025-01-09 PROCEDURE — 99999PBSHW POCT INR: Mod: PBBFAC,,,

## 2025-01-09 PROCEDURE — 85610 PROTHROMBIN TIME: CPT | Mod: PBBFAC

## 2025-01-09 NOTE — PROGRESS NOTES
Patient's INR is sub-therapeutic at 1.6. Patient reports he missed a dose. Advised patient of increased risk of clotting; signs/symptoms of clotting and need to go to ED if he experiences any. Patient reports he is not having any signs/symptoms of clotting. Instructions given: will give boosted dose of warfarin 10 mg today 1/9/25; then resume warfarin 5 mg on Sundays, Tuesdays and Thursdays; and 7.5 mg all other days. Recheck on 1/16/25. Calendar reviewed with patient. Patient verbalizes understanding.

## 2025-01-16 ENCOUNTER — ANTI-COAG VISIT (OUTPATIENT)
Dept: CARDIOLOGY | Facility: CLINIC | Age: 55
End: 2025-01-16
Payer: MEDICARE

## 2025-01-16 DIAGNOSIS — Z95.2 HISTORY OF MECHANICAL AORTIC VALVE REPLACEMENT: ICD-10-CM

## 2025-01-16 DIAGNOSIS — Z79.01 LONG TERM (CURRENT) USE OF ANTICOAGULANTS: Primary | ICD-10-CM

## 2025-01-16 DIAGNOSIS — I48.0 PAROXYSMAL ATRIAL FIBRILLATION: ICD-10-CM

## 2025-01-16 DIAGNOSIS — I48.91 ON COUMADIN FOR ATRIAL FIBRILLATION: ICD-10-CM

## 2025-01-16 DIAGNOSIS — Z79.01 ON COUMADIN FOR ATRIAL FIBRILLATION: ICD-10-CM

## 2025-01-16 LAB
CTP QC/QA: YES
INR PPP: 2 (ref 2–3)

## 2025-01-16 PROCEDURE — 99999PBSHW POCT INR: Mod: PBBFAC,,,

## 2025-01-16 PROCEDURE — 93793 ANTICOAG MGMT PT WARFARIN: CPT | Mod: ,,,

## 2025-01-16 PROCEDURE — 85610 PROTHROMBIN TIME: CPT | Mod: PBBFAC

## 2025-01-16 NOTE — PROGRESS NOTES
Patient's INR is therapeutic at 2.0.  Patient reports no changes. Instructions given: Continue warfarin 5 mg on Thursdays, Sundays and Tuesdays; and 7.5 mg all other days. Recheck on 1/30/25. Calendar reviewed patient. Patient verbalizes understanding.

## 2025-01-30 ENCOUNTER — ANTI-COAG VISIT (OUTPATIENT)
Dept: CARDIOLOGY | Facility: CLINIC | Age: 55
End: 2025-01-30
Payer: MEDICARE

## 2025-01-30 DIAGNOSIS — I48.0 PAROXYSMAL ATRIAL FIBRILLATION: ICD-10-CM

## 2025-01-30 DIAGNOSIS — Z95.2 HISTORY OF MECHANICAL AORTIC VALVE REPLACEMENT: ICD-10-CM

## 2025-01-30 DIAGNOSIS — Z79.01 ON COUMADIN FOR ATRIAL FIBRILLATION: ICD-10-CM

## 2025-01-30 DIAGNOSIS — I48.91 ON COUMADIN FOR ATRIAL FIBRILLATION: ICD-10-CM

## 2025-01-30 DIAGNOSIS — Z79.01 LONG TERM (CURRENT) USE OF ANTICOAGULANTS: Primary | ICD-10-CM

## 2025-01-30 LAB
CTP QC/QA: YES
INR PPP: 1.8 (ref 2–3)

## 2025-01-30 PROCEDURE — 85610 PROTHROMBIN TIME: CPT | Mod: PBBFAC

## 2025-01-30 PROCEDURE — 99999PBSHW POCT INR: Mod: PBBFAC,,,

## 2025-01-30 NOTE — PROGRESS NOTES
Patient's INR is sub-therapeutic at 1.8. Patient reports he took lower dose because he ran out of 7.5 mg tablets and did not think to take one and one-half of the 5 mg tablets and ate walnuts. Patient states he will  7.5 mg tablets from [facility] today 1/30/25. Advised patient of increased risk of clotting; signs/symptoms of clotting and need to go to ED if he experiences any. Patient reports he is not having any signs/symptoms of clotting. Instructions given: Will give boosted dose of warfarin 7.5 mg; then 5 mg on Sundays, Tuesdays and Thursdays; and 7.5 mg all other days. Recheck on 2/6/25 with other appointment. Calendar reviewed with patient. Patient verbalizes understanding.

## 2025-02-02 DIAGNOSIS — B18.1 CHRONIC VIRAL HEPATITIS B WITHOUT DELTA AGENT AND WITHOUT COMA: ICD-10-CM

## 2025-02-03 RX ORDER — TENOFOVIR DISOPROXIL FUMARATE 300 MG/1
300 TABLET, FILM COATED ORAL WEEKLY
Qty: 4 TABLET | Refills: 3 | OUTPATIENT
Start: 2025-02-03

## 2025-02-06 ENCOUNTER — ANTI-COAG VISIT (OUTPATIENT)
Dept: CARDIOLOGY | Facility: CLINIC | Age: 55
End: 2025-02-06
Payer: MEDICARE

## 2025-02-06 ENCOUNTER — OFFICE VISIT (OUTPATIENT)
Dept: UROLOGY | Facility: CLINIC | Age: 55
End: 2025-02-06
Payer: MEDICARE

## 2025-02-06 VITALS
SYSTOLIC BLOOD PRESSURE: 99 MMHG | DIASTOLIC BLOOD PRESSURE: 62 MMHG | WEIGHT: 205 LBS | BODY MASS INDEX: 26.31 KG/M2 | HEIGHT: 74 IN | HEART RATE: 96 BPM

## 2025-02-06 DIAGNOSIS — Z95.2 HISTORY OF MECHANICAL AORTIC VALVE REPLACEMENT: ICD-10-CM

## 2025-02-06 DIAGNOSIS — R31.0 GROSS HEMATURIA: Primary | ICD-10-CM

## 2025-02-06 DIAGNOSIS — I48.91 ON COUMADIN FOR ATRIAL FIBRILLATION: ICD-10-CM

## 2025-02-06 DIAGNOSIS — Z79.01 LONG TERM (CURRENT) USE OF ANTICOAGULANTS: Primary | ICD-10-CM

## 2025-02-06 DIAGNOSIS — I48.0 PAROXYSMAL ATRIAL FIBRILLATION: ICD-10-CM

## 2025-02-06 DIAGNOSIS — N31.9 NEUROMUSCULAR DYSFUNCTION OF BLADDER, UNSPECIFIED: ICD-10-CM

## 2025-02-06 DIAGNOSIS — N28.1 RENAL CYST: ICD-10-CM

## 2025-02-06 DIAGNOSIS — N52.9 ERECTILE DYSFUNCTION, UNSPECIFIED ERECTILE DYSFUNCTION TYPE: ICD-10-CM

## 2025-02-06 DIAGNOSIS — Z79.01 ON COUMADIN FOR ATRIAL FIBRILLATION: ICD-10-CM

## 2025-02-06 LAB
CTP QC/QA: YES
INR PPP: 1.3 (ref 2–3)

## 2025-02-06 PROCEDURE — 99214 OFFICE O/P EST MOD 30 MIN: CPT | Mod: PBBFAC | Performed by: UROLOGY

## 2025-02-06 PROCEDURE — 99999PBSHW POCT INR: Mod: PBBFAC,,,

## 2025-02-06 PROCEDURE — 99499 UNLISTED E&M SERVICE: CPT | Mod: S$PBB,,, | Performed by: UROLOGY

## 2025-02-06 PROCEDURE — 99999 PR PBB SHADOW E&M-EST. PATIENT-LVL IV: CPT | Mod: PBBFAC,,, | Performed by: UROLOGY

## 2025-02-06 PROCEDURE — 93793 ANTICOAG MGMT PT WARFARIN: CPT | Mod: ,,,

## 2025-02-06 PROCEDURE — 85610 PROTHROMBIN TIME: CPT | Mod: PBBFAC

## 2025-02-06 NOTE — PROGRESS NOTES
Patient's INR is sub-therapeutic at 1.3. Patient reports he ate cabbage and missed a dose of warfarin.  Advised patient of increased risk of clotting; signs/symptoms of clotting and need to go to ED if he experiences any. Patient reports he is not having any signs/symptoms of clotting. Sent to PharmD for dosing/instructions.

## 2025-02-06 NOTE — PROGRESS NOTES
Boost aggressively for AVR, HD pt.  Pt needs to remain compliant with dosing and diet.  ER with any clotting or s/s of stroke.  Return early next week.

## 2025-02-18 ENCOUNTER — RESULTS FOLLOW-UP (OUTPATIENT)
Dept: HEPATOLOGY | Facility: CLINIC | Age: 55
End: 2025-02-18

## 2025-02-18 ENCOUNTER — ANTI-COAG VISIT (OUTPATIENT)
Dept: CARDIOLOGY | Facility: CLINIC | Age: 55
End: 2025-02-18
Payer: MEDICARE

## 2025-02-18 ENCOUNTER — HOSPITAL ENCOUNTER (OUTPATIENT)
Dept: RADIOLOGY | Facility: HOSPITAL | Age: 55
Discharge: HOME | End: 2025-02-18
Attending: INTERNAL MEDICINE
Payer: MEDICARE

## 2025-02-18 DIAGNOSIS — Z79.01 ON COUMADIN FOR ATRIAL FIBRILLATION: Primary | ICD-10-CM

## 2025-02-18 DIAGNOSIS — K76.6 PORTAL HYPERTENSION: ICD-10-CM

## 2025-02-18 DIAGNOSIS — I48.91 ON COUMADIN FOR ATRIAL FIBRILLATION: Primary | ICD-10-CM

## 2025-02-18 DIAGNOSIS — Z95.2 HISTORY OF MECHANICAL AORTIC VALVE REPLACEMENT: ICD-10-CM

## 2025-02-18 DIAGNOSIS — I48.0 PAROXYSMAL ATRIAL FIBRILLATION: ICD-10-CM

## 2025-02-18 PROCEDURE — 76705 ECHO EXAM OF ABDOMEN: CPT | Mod: TC

## 2025-02-18 NOTE — PROGRESS NOTES
INR at goal-2.5. Medications and chart reviewed. No changes noted to necessitate adjustment of warfarin or follow-up plan. See calendar.  Lab collected.  Pt contacted, confirms dose.  Pt will remain on warfarin 5 mg every Sun, Tue, Thu; 7.5 mg all other days.  Repeat INR in 2 weeks in  CC.

## 2025-03-06 RX ORDER — ROSUVASTATIN CALCIUM 10 MG/1
10 TABLET, COATED ORAL DAILY
Qty: 90 TABLET | Refills: 0 | Status: SHIPPED | OUTPATIENT
Start: 2025-03-06 | End: 2025-06-04

## 2025-03-06 NOTE — TELEPHONE ENCOUNTER
Refill Routing Note   Medication(s) are not appropriate for processing by [facility] for the following reason(s):        Drug-disease interaction    [facility] action(s):  Defer     Requires labs : Yes      Medication Therapy Plan: Drug-Disease: rosuvastatin and ESRD (end stage renal disease) on dialysis    Pharmacist review requested: Yes     Appointments  past 12m or future 3m with PCP    Date Provider   Last Visit   7/2/2024 Boston Nevarez MD   Next Visit   Visit date not found Boston Nevarez MD   ED visits in past 90 days: 0        Note composed:11:41 AM 03/06/2025

## 2025-03-06 NOTE — TELEPHONE ENCOUNTER
Refill Decision Note   Isidro Cindy  is requesting a refill authorization.  Brief Assessment and Rationale for Refill:  Approve     Medication Therapy Plan:         Pharmacist review requested: Yes   Comments:     Note composed:11:48 AM 03/06/2025

## 2025-03-06 NOTE — TELEPHONE ENCOUNTER
Care Due:                  Date            Visit Type   Department     Provider  --------------------------------------------------------------------------------                                [facility] INTERNAL  Last Visit: 07-      FOLLOW UP    MEDICINE       Boston Nevarez  Next Visit: None Scheduled  None         None Found                                                            Last  Test          Frequency    Reason                     Performed    Due Date  --------------------------------------------------------------------------------    Lipid Panel.  12 months..  rosuvastatin.............  03- 03-    [facility] Embedded Care Due Messages. Reference number: [id number].   3/05/2025 7:38:04 PM CST

## 2025-03-13 ENCOUNTER — ANTI-COAG VISIT (OUTPATIENT)
Dept: CARDIOLOGY | Facility: CLINIC | Age: 55
End: 2025-03-13
Payer: MEDICARE

## 2025-03-13 DIAGNOSIS — I48.91 ON COUMADIN FOR ATRIAL FIBRILLATION: ICD-10-CM

## 2025-03-13 DIAGNOSIS — Z79.01 LONG TERM (CURRENT) USE OF ANTICOAGULANTS: Primary | ICD-10-CM

## 2025-03-13 DIAGNOSIS — Z95.2 HISTORY OF MECHANICAL AORTIC VALVE REPLACEMENT: ICD-10-CM

## 2025-03-13 DIAGNOSIS — I48.0 PAROXYSMAL ATRIAL FIBRILLATION: ICD-10-CM

## 2025-03-13 DIAGNOSIS — Z79.01 ON COUMADIN FOR ATRIAL FIBRILLATION: ICD-10-CM

## 2025-03-13 LAB
CTP QC/QA: YES
INR PPP: 1.8 (ref 2–3)

## 2025-03-13 PROCEDURE — 85610 PROTHROMBIN TIME: CPT | Mod: PBBFAC

## 2025-03-13 PROCEDURE — 99999PBSHW POCT INR: Mod: PBBFAC,,,

## 2025-03-13 RX ORDER — WARFARIN SODIUM 5 MG/1
TABLET ORAL
Qty: 12 TABLET | Refills: 11 | Status: SHIPPED | OUTPATIENT
Start: 2025-03-13

## 2025-03-13 RX ORDER — WARFARIN 7.5 MG/1
TABLET ORAL
Qty: 20 TABLET | Refills: 11 | Status: SHIPPED | OUTPATIENT
Start: 2025-03-13

## 2025-03-13 NOTE — PROGRESS NOTES
Patient's INR is sub-therapeutic at 1.8. Patient reports he has been taking a lesser dose than instructed because he ran out of warfarin last week. Advised patient to contact our office or pharmacy for refills at least 5 days before running out. Patient reports he will  prescription for warfarin 5 mg tablets today-3/13/25.  Advised patient of increased risk of clotting; signs/symptoms of clotting and need to go to ED if he experiences any. Patient reports he is not having any signs/symptoms of clotting. Instructions given: Will give boosted dose of warfarin 7.5 mg today-3/13/25; then 5 mg on Sundays, Tuesdays and Thursdays; and 7.5 mg all other days. Recheck on 3/18/25 with other appointment. Calendar reviewed with patient. Patient verbalizes understanding.

## 2025-03-13 NOTE — PROGRESS NOTES
Chart reviewed, agree with LPN plan.  Refills resent for 5 mg and 7.5 mg tablets to [facility]

## 2025-03-14 ENCOUNTER — PATIENT MESSAGE (OUTPATIENT)
Dept: CARDIOLOGY | Facility: CLINIC | Age: 55
End: 2025-03-14
Payer: MEDICARE

## 2025-03-14 ENCOUNTER — TELEPHONE (OUTPATIENT)
Dept: CARDIOLOGY | Facility: CLINIC | Age: 55
End: 2025-03-14
Payer: MEDICARE

## 2025-03-14 NOTE — TELEPHONE ENCOUNTER
Per staff, Patient is at [facility] requesting a refill on a medication. Attempted to call patient because we are here at [facility] with provider. No answer, left VM and will send message via [employer].

## 2025-03-18 ENCOUNTER — ANTI-COAG VISIT (OUTPATIENT)
Dept: CARDIOLOGY | Facility: CLINIC | Age: 55
End: 2025-03-18
Payer: MEDICARE

## 2025-03-18 ENCOUNTER — OFFICE VISIT (OUTPATIENT)
Dept: CARDIOLOGY | Facility: CLINIC | Age: 55
End: 2025-03-18
Payer: MEDICARE

## 2025-03-18 ENCOUNTER — PATIENT MESSAGE (OUTPATIENT)
Dept: CARDIOLOGY | Facility: HOSPITAL | Age: 55
End: 2025-03-18
Payer: MEDICARE

## 2025-03-18 VITALS
HEART RATE: 95 BPM | WEIGHT: 207.88 LBS | OXYGEN SATURATION: 95 % | DIASTOLIC BLOOD PRESSURE: 60 MMHG | SYSTOLIC BLOOD PRESSURE: 100 MMHG | BODY MASS INDEX: 26.69 KG/M2

## 2025-03-18 DIAGNOSIS — Z95.2 S/P AVR (AORTIC VALVE REPLACEMENT): ICD-10-CM

## 2025-03-18 DIAGNOSIS — I48.91 ON COUMADIN FOR ATRIAL FIBRILLATION: ICD-10-CM

## 2025-03-18 DIAGNOSIS — Z79.01 LONG TERM (CURRENT) USE OF ANTICOAGULANTS: ICD-10-CM

## 2025-03-18 DIAGNOSIS — Z98.890 HISTORY OF RADIOFREQUENCY ABLATION FOR COMPLEX RIGHT ATRIAL ARRHYTHMIA: ICD-10-CM

## 2025-03-18 DIAGNOSIS — Z79.01 ANTICOAGULATED: ICD-10-CM

## 2025-03-18 DIAGNOSIS — I50.32 CHRONIC DIASTOLIC HEART FAILURE: ICD-10-CM

## 2025-03-18 DIAGNOSIS — I47.19 ATRIAL TACHYCARDIA: ICD-10-CM

## 2025-03-18 DIAGNOSIS — E78.49 OTHER HYPERLIPIDEMIA: ICD-10-CM

## 2025-03-18 DIAGNOSIS — Z79.01 ON COUMADIN FOR ATRIAL FIBRILLATION: ICD-10-CM

## 2025-03-18 DIAGNOSIS — I47.10 SVT (SUPRAVENTRICULAR TACHYCARDIA): ICD-10-CM

## 2025-03-18 DIAGNOSIS — Z95.2 HISTORY OF MECHANICAL AORTIC VALVE REPLACEMENT: ICD-10-CM

## 2025-03-18 DIAGNOSIS — I48.0 PAF (PAROXYSMAL ATRIAL FIBRILLATION): ICD-10-CM

## 2025-03-18 DIAGNOSIS — Z79.01 LONG TERM (CURRENT) USE OF ANTICOAGULANTS: Primary | ICD-10-CM

## 2025-03-18 DIAGNOSIS — I49.3 PVC'S (PREMATURE VENTRICULAR CONTRACTIONS): ICD-10-CM

## 2025-03-18 DIAGNOSIS — Z95.2 H/O AORTIC VALVE REPLACEMENT: ICD-10-CM

## 2025-03-18 DIAGNOSIS — I70.0 AORTIC ATHEROSCLEROSIS: ICD-10-CM

## 2025-03-18 DIAGNOSIS — Z95.2 H/O MECHANICAL AORTIC VALVE REPLACEMENT: ICD-10-CM

## 2025-03-18 DIAGNOSIS — I47.29 NSVT (NONSUSTAINED VENTRICULAR TACHYCARDIA): ICD-10-CM

## 2025-03-18 DIAGNOSIS — I48.0 PAROXYSMAL ATRIAL FIBRILLATION: Primary | ICD-10-CM

## 2025-03-18 DIAGNOSIS — I27.20 PULMONARY HYPERTENSION: ICD-10-CM

## 2025-03-18 DIAGNOSIS — I48.0 PAROXYSMAL ATRIAL FIBRILLATION: ICD-10-CM

## 2025-03-18 LAB
CTP QC/QA: YES
INR PPP: 1.9 (ref 2–3)

## 2025-03-18 PROCEDURE — 99213 OFFICE O/P EST LOW 20 MIN: CPT | Mod: PBBFAC | Performed by: INTERNAL MEDICINE

## 2025-03-18 PROCEDURE — 99999 PR PBB SHADOW E&M-EST. PATIENT-LVL III: CPT | Mod: PBBFAC,,, | Performed by: INTERNAL MEDICINE

## 2025-03-18 PROCEDURE — G2211 COMPLEX E/M VISIT ADD ON: HCPCS | Mod: S$PBB,,, | Performed by: INTERNAL MEDICINE

## 2025-03-18 PROCEDURE — 99214 OFFICE O/P EST MOD 30 MIN: CPT | Mod: S$PBB,,, | Performed by: INTERNAL MEDICINE

## 2025-03-18 PROCEDURE — 99999PBSHW POCT INR: Mod: PBBFAC,,,

## 2025-03-18 PROCEDURE — 85610 PROTHROMBIN TIME: CPT | Mod: PBBFAC

## 2025-03-18 RX ORDER — DILTIAZEM HYDROCHLORIDE EXTENDED-RELEASE TABLETS 180 MG/1
540 TABLET, EXTENDED RELEASE ORAL DAILY
Qty: 270 TABLET | Refills: 1 | Status: SHIPPED | OUTPATIENT
Start: 2025-03-18 | End: 2026-03-18

## 2025-03-18 RX ORDER — UBIDECARENONE 100 MG
100 CAPSULE ORAL DAILY
Qty: 90 CAPSULE | Refills: 1 | Status: SHIPPED | OUTPATIENT
Start: 2025-03-18 | End: 2026-03-18

## 2025-03-18 NOTE — PROGRESS NOTES
Subjective:   Patient ID:  Isidro White Jr. is a 55 y.o. male who presents for cardiac consult of No chief complaint on file.          The patient came in today for cardiac consult of No chief complaint on file.      Isidro White Jr. is a 55 y.o. male pt with PAF, ASD closure at age 8 ([facility]), HFpEF, s/p AVR with a 22 mm mechanical valve and TV annuloplasty with a 28 mm ring 3/17, HTN, PHTN, Mitral stenosis, SVT, EP performed RFA (3/13/17) and has been on HD - MWF since Feb 8,2016 here for CV follow up.     10/8/24  BP elevated today - 150/90. HR 84. BMI 26  He feels well now, HGB improved.   HE was taken off a lot of BP meds.     11/19/24  BP low normal. . BMI 26 - 208 lbs    3/18/25  BP low normal - has been off Labetolol  Has been taking extra dilt - will increase to 540 mg  He has more fatigue lately along with tachycardia        Results for orders placed during the hospital encounter of 05/02/24    Echo    Interpretation Summary    Left Ventricle: The left ventricle is normal in size. Normal wall thickness. There is concentric remodeling. Normal wall motion. Ejection fraction by visual approximation is 60%. Grade II diastolic dysfunction.    Right Ventricle: Normal right ventricular cavity size. Wall thickness is normal. Systolic function is normal.    Left Atrium: Left atrium is severely dilated.    Aortic Valve: There is a tilting disc prosthetic valve in the aortic position.    Mitral Valve: Moderately calcified leaflets. Moderately restricted motion. There is mild to moderate stenosis. MVA 2.6 cm2 by planimetry, 1.6 cm2 by PHT.  The mean pressure gradient across the mitral valve is 11 mmHg at a heart rate of  bpm. There is mild regurgitation.    Tricuspid Valve: There is mild to moderate regurgitation.    Pulmonary Artery: There is pulmonary hypertension. The estimated pulmonary artery systolic pressure is 70 mmHg.    IVC/SVC: Normal venous pressure at 3 mmHg.      Results for  orders placed during the hospital encounter of 04/27/23    Echo    Interpretation Summary  · The left ventricle is normal in size with concentric remodeling and low normal systolic function.  · Mild left atrial enlargement.  · Grade I left ventricular diastolic dysfunction.  · The estimated PA systolic pressure is 47 mmHg.  · Normal right ventricular size with normal right ventricular systolic function.  · There is pulmonary hypertension.  · Normal central venous pressure (3 mmHg).  · There is a bileaflet tilting disc mechanical aortic valve present. Prosthetic aortic valve is normal.  · The aortic valve mean gradient is 19 mmHg with a dimensionless index of 0.47.  · The estimated ejection fraction is 50%.  · The mean diastolic gradient across the mitral valve is 11 mmHg at a heart rate of 76 bpm.  · There is mild to moderate mitral stenosis.  · Mild to moderate tricuspid regurgitation.      Results for orders placed during the hospital encounter of 07/07/22    Echo    Interpretation Summary  · The left ventricle is normal in size with moderate concentric hypertrophy and normal systolic function.  · Mild left atrial enlargement.  · The estimated ejection fraction is 60%.  · Grade II left ventricular diastolic dysfunction.  · There is abnormal septal wall motion consistent with post-operative status.  · Normal right ventricular size with normal right ventricular systolic function.  · Mild right atrial enlargement.  · There is a bileaflet tilting disc mechanical aortic valve present.  · The aortic valve mean gradient is 15 mmHg with a dimensionless index of 0.52.  · The mean diastolic gradient across the mitral valve is 11 mmHg at a heart rate of bpm.  · There is mild to moderate mitral stenosis.  · There is moderate pulmonary hypertension.  · Mild tricuspid regurgitation.  · Intermediate central venous pressure (8 mmHg).  · The estimated PA systolic pressure is 54 mmHg.  · The aortic root is mildly  dilated.      RHC  Summary       The estimated blood loss was <50 mL.  Believe PA pressures are elevated in setting of increased filling pressures. Mean PA pressure is right around 34.  Recommend stricter volume management, review of echoes for the last 3 year demonstrates similar findings.  If this is a barrier to renal transplant, can consider adding PDE5, however do not think he will be able to tolerate given increased filling pressure and patient stating he drops pressures with HD.  Will likely need to discuss with abdominal transplant team.     The procedure log was documented by Documenter: Isis Sevilla and verified by Mariah Pierce MD.      Results for orders placed during the hospital encounter of 05/25/21    Nuclear Stress - Cardiology Interpreted    Interpretation Summary    Normal myocardial perfusion scan. There is no evidence of myocardial ischemia or infarction.    The gated perfusion images showed an ejection fraction of 65% at rest. The gated perfusion images showed an ejection fraction of 47% post stress.    The EKG portion of this study is negative for ischemia.    · Intermediate central venous pressure (8 mmHg).       Cath 2/17  1. Coronary angiography.      a.     Left main widely patent.      b.     LAD widely patent.      c.     Ramus widely patent and massive in size.      d.     Circumflex widely patent with a small OM 1 and OM 2 branch.      e.     Right coronary widely patent.  2. Hemodynamics:  Right heart catheterization.      a.     Pulmonary capillary wedge pressure 33 at end-expiration.      b.     Pulmonary artery pressure 78/42.      c.     Right ventricular pressure 70/21.      d.     Right atrial pressure 22 with a peak V-wave of 24.      e.     Cardiac output by thermodilution is between 6 and 7       L/minute, by Patricia 6.1 L/minute.    CONCLUSION  1. Normal coronary arteries.  2. Pulmonary hypertension.  3. Severe aortic insufficiency.      Past Medical History:   Diagnosis  Date    Anemia     Aortic valve stenosis     s/p mechanical AVR    Atrial fibrillation     Atrial flutter     Cardiomyopathy     CHF (congestive heart failure)     Drug-induced erectile dysfunction     ESRD due to hypertension     HD M, W, F    ESRD on dialysis     GERD (gastroesophageal reflux disease)     Hepatitis B     Hyperlipidemia     Hypertension     Nightmares     Obesity     DANIEL (obstructive sleep apnea) 11/12/2019    Secondary hyperparathyroidism of renal origin     Supraventricular tachycardia     atrial tachycardia    Tachycardia     Valvular regurgitation     AI, TR       Past Surgical History:   Procedure Laterality Date    ASD REPAIR      age 8 [facility]    AV Graft Creation Left 03/2017    CARDIAC CATHETERIZATION      [facility]     CARDIAC VALVE SURGERY  02/08/2017    22 mm Medtronic AV, 28 mm TV Medtronic annuloplaty ring    COLONOSCOPY N/A 8/27/2019    Procedure: COLONOSCOPY;  Surgeon: Addie John MD;  Location: [facility] ENDO;  Service: Endoscopy;  Laterality: N/A;  dialysis pt *needs K*    COLONOSCOPY N/A 9/3/2020    Procedure: COLONOSCOPY-need INR;  Surgeon: Jemma Youngblood MD;  Location: [facility] ENDO;  Service: Endoscopy;  Laterality: N/A;    ESOPHAGOGASTRODUODENOSCOPY N/A 8/27/2019    Procedure: EGD (ESOPHAGOGASTRODUODENOSCOPY);  Surgeon: Addie John MD;  Location: [facility];  Service: Endoscopy;  Laterality: N/A;    INSERTION OF INFLATABLE PENILE PROSTHESIS N/A 5/21/2024    Procedure: INSERTION, PENILE PROSTHESIS, INFLATABLE;  Surgeon: Bonifacio Shirley MD;  Location: [facility];  Service: Urology;  Laterality: N/A;    RADIOFREQUENCY ABLATION  03/13/2017    Atrial tachycardia    REMOVAL OF GRAFT Left 7/2/2020    Procedure: REMOVAL, GRAFT;  Surgeon: Sascha Sidhu MD;  Location: [facility] OR;  Service: Peripheral Vascular;  Laterality: Left;    RIGHT HEART CATHETERIZATION Right 8/12/2021    Procedure: INSERTION, CATHETER, RIGHT HEART;  Surgeon: Mariah Pierce MD;  Location: [facility] CATH  LAB;  Service: Cardiology;  Laterality: Right;       Social History     Tobacco Use    Smoking status: Never     Passive exposure: Never    Smokeless tobacco: Never   Substance Use Topics    Alcohol use: Yes     Alcohol/week: 14.0 standard drinks of alcohol     Types: 14 Glasses of wine per week     Comment: Drinks 1-2 glasses of red wine - quit in 2016; does have heavy drinking history; started drinking at age 13; was drinking a 12 pack over the weekend and two 24 ounces of beer a day during the week along with a pint of hard alcohol    Drug use: No       Family History   Problem Relation Name Age of Onset    Leukemia Mother      Cancer Mother      Heart attack Father          massive MI at age 67    No Known Problems Sister      No Known Problems Brother      No Known Problems Sister      No Known Problems Sister      Heart failure Paternal Grandmother      Diabetes Paternal Aunt      Hypertension Paternal Aunt      Leukemia Paternal Aunt          CLL    Suicide Paternal Uncle      Anesthesia problems Paternal Uncle      Diabetes Paternal Aunt      Stroke Paternal Aunt      Valvular heart disease Maternal Aunt      Kidney disease Neg Hx      Colon cancer Neg Hx             Review of Systems   Constitutional:  Positive for malaise/fatigue.   HENT: Negative.     Eyes: Negative.    Respiratory:  Negative for shortness of breath.    Cardiovascular:  Negative for chest pain and palpitations.   Gastrointestinal: Negative.    Genitourinary:  Negative for hematuria.   Musculoskeletal:  Positive for joint pain.   Skin: Negative.    Neurological: Negative.    Endo/Heme/Allergies: Negative.    Psychiatric/Behavioral: Negative.     All 12 systems otherwise negative.      Wt Readings from Last 3 Encounters:   03/18/25 94.3 kg (207 lb 14.3 oz)   02/06/25 93 kg (205 lb 0.4 oz)   12/19/24 93.7 kg (206 lb 9.1 oz)     Temp Readings from Last 3 Encounters:   10/07/24 98 °F (36.7 °C) (Temporal)   09/24/24 98.7 °F (37.1 °C) (Temporal)    08/13/24 97.6 °F (36.4 °C)     BP Readings from Last 3 Encounters:   03/18/25 100/60   02/06/25 99/62   12/19/24 106/76     Pulse Readings from Last 3 Encounters:   03/18/25 95   02/06/25 96   12/19/24 104       /60 (BP Location: Right arm, Patient Position: Sitting)   Pulse 95   Wt 94.3 kg (207 lb 14.3 oz)   SpO2 95%   BMI 26.69 kg/m²      Objective:   Physical Exam  Vitals and nursing note reviewed.   Constitutional:       General: He is not in acute distress.     Appearance: He is well-developed. He is not diaphoretic.   HENT:      Head: Normocephalic and atraumatic.      Nose: Nose normal.   Eyes:      General: No scleral icterus.     Conjunctiva/sclera: Conjunctivae normal.   Neck:      Thyroid: No thyromegaly.      Vascular: No JVD.   Cardiovascular:      Rate and Rhythm: Normal rate. Rhythm irregular.      Heart sounds: S1 normal and S2 normal. Murmur heard.      Midsystolic murmur is present at the upper right sternal border.      No friction rub. No gallop. No S3 or S4 sounds.      Comments: Crisp S2  Pulmonary:      Effort: Pulmonary effort is normal. No respiratory distress.      Breath sounds: Normal breath sounds. No stridor. No wheezing or rales.   Chest:      Chest wall: No tenderness.   Abdominal:      General: Bowel sounds are normal. There is no distension.      Palpations: Abdomen is soft. There is no mass.      Tenderness: There is no abdominal tenderness. There is no rebound.   Genitourinary:     Comments: Deferred  Musculoskeletal:         General: No tenderness or deformity. Normal range of motion.      Cervical back: Normal range of motion and neck supple.   Lymphadenopathy:      Cervical: No cervical adenopathy.   Skin:     General: Skin is warm and dry.      Coloration: Skin is not pale.      Findings: No erythema or rash.   Neurological:      Mental Status: He is alert and oriented to person, place, and time.      Motor: No abnormal muscle tone.      Coordination: Coordination  normal.   Psychiatric:         Behavior: Behavior normal.         Thought Content: Thought content normal.         Judgment: Judgment normal.         Lab Results   Component Value Date     02/18/2025    K 4.1 02/18/2025    CL 97 02/18/2025    CO2 27 02/18/2025    BUN 50 (H) 02/18/2025    CREATININE 12.6 (H) 02/18/2025    GLU 91 02/18/2025    HGBA1C 4.9 03/12/2024    MG 2.3 08/12/2021    AST 14 02/18/2025    ALT 10 02/18/2025    ALBUMIN 2.9 (L) 02/18/2025    PROT 8.2 02/18/2025    BILITOT 0.6 02/18/2025    WBC 7.37 02/18/2025    HGB 10.7 (L) 02/18/2025    HCT 33.1 (L) 02/18/2025    HCT 25 (L) 02/08/2017     (H) 02/18/2025     02/18/2025    INR 1.9 (A) 03/18/2025    INR 2.5 (H) 02/18/2025    TSH 2.953 09/24/2024    CHOL 248 (H) 03/12/2024    HDL 29 (L) 03/12/2024    LDLCALC 170.0 (H) 03/12/2024    TRIG 245 (H) 03/12/2024     (H) 07/01/2021     Assessment:      1. Paroxysmal atrial fibrillation    2. Chronic diastolic heart failure    3. Aortic atherosclerosis    4. SVT (supraventricular tachycardia)    5. On Coumadin for atrial fibrillation    6. Long term (current) use of anticoagulants    7. S/P AVR (aortic valve replacement)    8. Pulmonary hypertension    9. H/O mechanical aortic valve replacement    10. NSVT (nonsustained ventricular tachycardia)    11. PVC's (premature ventricular contractions)    12. History of radiofrequency ablation for complex right atrial arrhythmia    13. Anticoagulated    14. Atrial tachycardia    15. PAF (paroxysmal atrial fibrillation)    16. H/O aortic valve replacement              Plan:   1. Chronic Diastolic HF  - cont low salt diet  - cont HD for volume removal    2. S/p mech AVR, with mild to moderate MS, mild to mod TR, pulm HTN   - cont coumadin  - f/u with coumadin clinic here  - f/u with [facility] - [place]  - ECHO in July 2022 with normal bi V function, stable valves AV and MV, mild to mod MS, PASP 54 mmHg  -ECHO 4/2023 with normal bi V  function, grade 1 DD, mech AV with mean 19mmHg, mild to mod MS - 11 mmHg. Mild to mod TR, PASP 47 mmHg.   -ECHO 5/2024 with normal bi V function, grade 2 DD, AV valve stable - mechanical. Mild to mod MS - MG 11 mmHg, mild MR, mild to mod TR. Pulm HTN - PASP 70 mmHg.   - repeat ECHO ordered     3. ESRD - dry weight improved  - cont HD  - anemia with PRBCs and EPO - f/u hemeonc  - anemia improved     4. HTN -  - cont meds, Dilt 420 mg  --> Has been taking extra dilt - will increase to 540 mg  - adjusted BP meds - labetelol TID - do not take in AM before HD  - stop labetolol  - restart Hydral and Benicar 40     5. PVCs, SVT s/p RFA , Afib - in NSR now   - cont BB and CCB   - f/u with Dr. De León  - recurrence of his old arrhythmia or could be a new source (AFL, R atriotomy, perimitral).  - discussed ablation.   Has been taking extra dilt - will increase to 540 mg  - order 7 day vital     6. HLD  - cont statin, repeat labs     7. ED  - cont tx PRN  - f/u urology for penile implant surgery   - monitor BP, avoid nitrates     8. Pulm HTN/ DANIEL  - PASP 56 mmHg. - ..> 70   -f/u CHF/pulm HTN clinic - may start PDE5  - f/u with Dr. Pierce    9. Chronic hep B   - cont tx per hepatology  - further workup pending for cirrhosis - EGD      Visit today included increased complexity associated with the care of the episodic problem SVT addressed and managing the longitudinal care of the patient due to the serious and/or complex managed problem(s) .      Thank you for allowing me to participate in this patient's care. Please do not hesitate to contact me with any questions or concerns. Consult note has been forwarded to the referral physician.     Fuad Gudino MD, [facility]  Cardiovascular Disease  [facility], [place]  [phone] (P)

## 2025-04-01 ENCOUNTER — HOSPITAL ENCOUNTER (OUTPATIENT)
Dept: CARDIOLOGY | Facility: HOSPITAL | Age: 55
Discharge: HOME | End: 2025-04-01
Attending: INTERNAL MEDICINE
Payer: MEDICARE

## 2025-04-01 ENCOUNTER — ANTI-COAG VISIT (OUTPATIENT)
Dept: CARDIOLOGY | Facility: CLINIC | Age: 55
End: 2025-04-01
Payer: MEDICARE

## 2025-04-01 VITALS
BODY MASS INDEX: 26.56 KG/M2 | HEIGHT: 74 IN | SYSTOLIC BLOOD PRESSURE: 100 MMHG | DIASTOLIC BLOOD PRESSURE: 60 MMHG | WEIGHT: 207 LBS | HEART RATE: 80 BPM

## 2025-04-01 DIAGNOSIS — Z79.01 ANTICOAGULATED: ICD-10-CM

## 2025-04-01 DIAGNOSIS — I48.0 PAROXYSMAL ATRIAL FIBRILLATION: ICD-10-CM

## 2025-04-01 DIAGNOSIS — Z95.2 H/O MECHANICAL AORTIC VALVE REPLACEMENT: ICD-10-CM

## 2025-04-01 DIAGNOSIS — I48.0 PAF (PAROXYSMAL ATRIAL FIBRILLATION): ICD-10-CM

## 2025-04-01 DIAGNOSIS — I47.10 SVT (SUPRAVENTRICULAR TACHYCARDIA): ICD-10-CM

## 2025-04-01 DIAGNOSIS — I27.20 PULMONARY HYPERTENSION: ICD-10-CM

## 2025-04-01 DIAGNOSIS — Z98.890 HISTORY OF RADIOFREQUENCY ABLATION FOR COMPLEX RIGHT ATRIAL ARRHYTHMIA: ICD-10-CM

## 2025-04-01 DIAGNOSIS — Z95.2 S/P AVR (AORTIC VALVE REPLACEMENT): ICD-10-CM

## 2025-04-01 DIAGNOSIS — Z79.01 LONG TERM (CURRENT) USE OF ANTICOAGULANTS: ICD-10-CM

## 2025-04-01 DIAGNOSIS — Z79.01 LONG TERM (CURRENT) USE OF ANTICOAGULANTS: Primary | ICD-10-CM

## 2025-04-01 DIAGNOSIS — I49.3 PVC'S (PREMATURE VENTRICULAR CONTRACTIONS): ICD-10-CM

## 2025-04-01 DIAGNOSIS — I48.91 ON COUMADIN FOR ATRIAL FIBRILLATION: ICD-10-CM

## 2025-04-01 DIAGNOSIS — I70.0 AORTIC ATHEROSCLEROSIS: ICD-10-CM

## 2025-04-01 DIAGNOSIS — I50.32 CHRONIC DIASTOLIC HEART FAILURE: ICD-10-CM

## 2025-04-01 DIAGNOSIS — I47.19 ATRIAL TACHYCARDIA: ICD-10-CM

## 2025-04-01 DIAGNOSIS — Z95.2 H/O AORTIC VALVE REPLACEMENT: ICD-10-CM

## 2025-04-01 DIAGNOSIS — Z95.2 HISTORY OF MECHANICAL AORTIC VALVE REPLACEMENT: ICD-10-CM

## 2025-04-01 DIAGNOSIS — Z79.01 ON COUMADIN FOR ATRIAL FIBRILLATION: ICD-10-CM

## 2025-04-01 LAB
AORTIC ROOT ANNULUS: 3.29 CM
AV INDEX (PROSTH): 0.45
AV MEAN GRADIENT: 16 MMHG
AV PEAK GRADIENT: 31 MMHG
AV VALVE AREA BY VELOCITY RATIO: 1.5 CM²
AV VALVE AREA: 1.4 CM²
AV VELOCITY RATIO: 0.46
BSA FOR ECHO PROCEDURE: 2.21 M2
CTP QC/QA: YES
CV ECHO LV RWT: 0.64 CM
DOP CALC AO PEAK VEL: 2.8 M/S
DOP CALC AO VTI: 53.3 CM
DOP CALC LVOT AREA: 3.1 CM2
DOP CALC LVOT DIAMETER: 2 CM
DOP CALC LVOT PEAK VEL: 1.3 M/S
DOP CALC LVOT STROKE VOLUME: 76 CM3
DOP CALC MV VTI: 78.4 CM
DOP CALC RVOT PEAK VEL: 1.14 M/S
DOP CALC RVOT VTI: 26.2 CM
DOP CALCLVOT PEAK VEL VTI: 24.2 CM
E WAVE DECELERATION TIME: 167 MSEC
E/A RATIO: 1.31
E/E' RATIO: 14 M/S
ECHO LV POSTERIOR WALL: 1.4 CM (ref 0.6–1.1)
FRACTIONAL SHORTENING: 29.5 % (ref 28–44)
HCV RNA # SERPL NAA+PROBE: 1867.4 MMHG/S
INR PPP: 2 (ref 2–3)
INTERVENTRICULAR SEPTUM: 1.2 CM (ref 0.6–1.1)
IVRT: 122 MSEC
LA MAJOR: 5.7 CM
LA MINOR: 5.1 CM
LA WIDTH: 4.2 CM
LEFT ATRIUM AREA SYSTOLIC (APICAL 2 CHAMBER): 26.68 CM2
LEFT ATRIUM AREA SYSTOLIC (APICAL 4 CHAMBER): 30.81 CM2
LEFT ATRIUM SIZE: 4.4 CM
LEFT ATRIUM VOLUME INDEX MOD: 45 ML/M2
LEFT ATRIUM VOLUME INDEX: 38 ML/M2
LEFT ATRIUM VOLUME MOD: 99 ML
LEFT ATRIUM VOLUME: 85 CM3
LEFT INTERNAL DIMENSION IN SYSTOLE: 3.1 CM (ref 2.1–4)
LEFT VENTRICLE DIASTOLIC VOLUME INDEX: 40.27 ML/M2
LEFT VENTRICLE DIASTOLIC VOLUME: 89 ML
LEFT VENTRICLE END SYSTOLIC VOLUME APICAL 2 CHAMBER: 87.58 ML
LEFT VENTRICLE END SYSTOLIC VOLUME APICAL 4 CHAMBER: 113 ML
LEFT VENTRICLE MASS INDEX: 97.3 G/M2
LEFT VENTRICLE SYSTOLIC VOLUME INDEX: 17.2 ML/M2
LEFT VENTRICLE SYSTOLIC VOLUME: 38 ML
LEFT VENTRICULAR INTERNAL DIMENSION IN DIASTOLE: 4.4 CM (ref 3.5–6)
LEFT VENTRICULAR MASS: 215.1 G
LV LATERAL E/E' RATIO: 16.3 M/S
LV SEPTAL E/E' RATIO: 12.3 M/S
LVED V (TEICH): 89.47 ML
LVES V (TEICH): 37.95 ML
LVOT MG: 3.97 MMHG
LVOT MV: 0.95 CM/S
MV MEAN GRADIENT: 21 MMHG
MV PEAK A VEL: 0.75 M/S
MV PEAK E VEL: 0.98 M/S
MV PEAK GRADIENT: 37 MMHG
MV STENOSIS PRESSURE HALF TIME: 48.52 MS
MV VALVE AREA BY CONTINUITY EQUATION: 0.97 CM2
MV VALVE AREA P 1/2 METHOD: 4.53 CM2
OHS CV RV/LV RATIO: 1.11 CM
PISA TR MAX VEL: 4.4 M/S
PV MEAN GRADIENT: 3 MMHG
PV PEAK GRADIENT: 10 MMHG
PV PEAK VELOCITY: 1.62 M/S
RA MAJOR: 4.87 CM
RA PRESSURE ESTIMATED: 8 MMHG
RA WIDTH: 4.5 CM
RIGHT VENTRICLE DIASTOLIC BASEL DIMENSION: 4.9 CM
RIGHT VENTRICULAR END-DIASTOLIC DIMENSION: 4.86 CM
RV TB RVSP: 12 MMHG
SINUS: 3.25 CM
STJ: 2.98 CM
TDI LATERAL: 0.06 M/S
TDI SEPTAL: 0.08 M/S
TDI: 0.07 M/S
TR MAX PG: 77 MMHG
TRICUSPID ANNULAR PLANE SYSTOLIC EXCURSION: 1.15 CM
TV REST PULMONARY ARTERY PRESSURE: 85 MMHG
Z-SCORE OF LEFT VENTRICULAR DIMENSION IN END DIASTOLE: -5.51
Z-SCORE OF LEFT VENTRICULAR DIMENSION IN END SYSTOLE: -3.18

## 2025-04-01 PROCEDURE — 93306 TTE W/DOPPLER COMPLETE: CPT

## 2025-04-01 PROCEDURE — 93306 TTE W/DOPPLER COMPLETE: CPT | Mod: 26,,, | Performed by: INTERNAL MEDICINE

## 2025-04-01 PROCEDURE — 85610 PROTHROMBIN TIME: CPT | Mod: PBBFAC

## 2025-04-01 PROCEDURE — 99999PBSHW POCT INR: Mod: PBBFAC,,,

## 2025-04-01 NOTE — PROGRESS NOTES
Patient's INR is therapeutic at 2.0. Patient reports he followed previous instructions. Patient reports no changes. Instructions given: Continue warfarin 5 mg on Tuesdays, Thursdays and Sundays; and 7.5 mg all other days. Recheck on 4/15/25. Calendar reviewed with patient. Patient verbalizes understanding.

## 2025-04-02 ENCOUNTER — RESULTS FOLLOW-UP (OUTPATIENT)
Dept: CARDIOLOGY | Facility: HOSPITAL | Age: 55
End: 2025-04-02

## 2025-04-02 ENCOUNTER — TELEPHONE (OUTPATIENT)
Dept: CARDIOLOGY | Facility: CLINIC | Age: 55
End: 2025-04-02
Payer: MEDICARE

## 2025-04-02 DIAGNOSIS — I05.0 SEVERE MITRAL VALVE STENOSIS: Primary | ICD-10-CM

## 2025-04-02 NOTE — TELEPHONE ENCOUNTER
Pt notified and message to dr gastelum staff that an appt is needed due to severe mitral valve stenosis   For possible valve replacement with pa pressures noted. Please let me know when the appt has been  made thank you pbr    Fuad Gudino MD to Shahab Merida Staff  [facility]D Staff  (Selected Message)      4/2/25  3:14 PM  Result Note  To my staff - Please contact the patient and let them know that their results of echo reveals Overall normal heart function with stiffness of heart (diastolic dysfunction). Continue low salt diet and good blood pressure control. Severe mitral valve stenosis/narrowing noted now - needs to follow up with Dr. Gastelum to discuss if he is a candidate for valve replacement, severely elevated PA pressures noted again need to follow up with pulmonary hypertension clinic in [facility] as well.      Can we get him into see Dr. Gastelum soon? Thanks

## 2025-04-03 ENCOUNTER — TELEPHONE (OUTPATIENT)
Dept: CARDIOTHORACIC SURGERY | Facility: CLINIC | Age: 55
End: 2025-04-03
Payer: MEDICARE

## 2025-04-03 DIAGNOSIS — I47.10 SVT (SUPRAVENTRICULAR TACHYCARDIA): ICD-10-CM

## 2025-04-03 RX ORDER — DILTIAZEM HYDROCHLORIDE 420 MG/1
420 CAPSULE, EXTENDED RELEASE ORAL DAILY
Qty: 90 CAPSULE | Refills: 1 | OUTPATIENT
Start: 2025-04-03 | End: 2026-04-03

## 2025-04-03 NOTE — TELEPHONE ENCOUNTER
Spoke with pt and scheduled consult with Dr. Gastelum per referral from Dr. Gudino. Pt requested appt in the beginning of May as he needs to get transportation in order. Pt is scheduled for consult on 5/6. Pt verbalized understanding of appt date, time, and location. Pt denies any questions at this time.

## 2025-04-06 ENCOUNTER — DOCUMENTATION ONLY (OUTPATIENT)
Dept: NEPHROLOGY | Facility: CLINIC | Age: 55
End: 2025-04-06
Payer: MEDICARE

## 2025-04-06 NOTE — PROGRESS NOTES
History & Physical      Chief Complaint:  H&P    HPI:        Patient is an  male with ESRD on HD MWF at the [facility].        ROS:        Constitutional: Negative for fever, chills, weight loss, malaise/fatigue and diaphoresis.   HENT: Negative for hearing loss, ear pain, nosebleeds, congestion, sore throat, neck pain, tinnitus and ear discharge.    Eyes: Negative for blurred vision, double vision, photophobia, pain, discharge and redness.   Respiratory: Negative for cough, hemoptysis, sputum production, shortness of breath, wheezing and stridor.    Cardiovascular: Negative for chest pain, palpitations, orthopnea, claudication, leg swelling and PND.   Gastrointestinal: Negative for heartburn, nausea, vomiting, abdominal pain, diarrhea, constipation, blood in stool and melena.   Genitourinary: Negative for dysuria, urgency, frequency, hematuria and flank pain.   Musculoskeletal: Negative for myalgias, back pain, joint pain and falls.   Skin: Negative for itching and rash.   Neurological: Negative for dizziness, tingling, tremors, sensory change, speech change, focal weakness, seizures, loss of consciousness, weakness and headaches.   Endo/Heme/Allergies: Negative for environmental allergies and polydipsia. Does not bruise/bleed easily.   Psychiatric/Behavioral: Negative for depression, suicidal ideas, hallucinations, memory loss and substance abuse. The patient is not nervous/anxious and does not have insomnia.    All 14 systems reviewed and negative except as noted above.            Past Medical History:   Diagnosis Date    Anemia     Aortic valve stenosis     s/p mechanical AVR    Atrial fibrillation     Atrial flutter     Cardiomyopathy     CHF (congestive heart failure)     Drug-induced erectile dysfunction     ESRD due to hypertension     HD M, W, F    ESRD on dialysis     GERD (gastroesophageal reflux disease)     Hepatitis B     Hyperlipidemia      Hypertension     Nightmares     Obesity     DANIEL (obstructive sleep apnea) 11/12/2019    Secondary hyperparathyroidism of renal origin     Supraventricular tachycardia     atrial tachycardia    Tachycardia     Valvular regurgitation     AI, TR       Past Surgical History:   Procedure Laterality Date    ASD REPAIR      age 8 [facility]    AV Graft Creation Left 03/2017    CARDIAC CATHETERIZATION      [facility]     CARDIAC VALVE SURGERY  02/08/2017    22 mm Medtronic AV, 28 mm TV Medtronic annuloplaty ring    COLONOSCOPY N/A 8/27/2019    Procedure: COLONOSCOPY;  Surgeon: Addie John MD;  Location: [facility] ENDO;  Service: Endoscopy;  Laterality: N/A;  dialysis pt *needs K*    COLONOSCOPY N/A 9/3/2020    Procedure: COLONOSCOPY-need INR;  Surgeon: Jemma Youngblood MD;  Location: [facility] ENDO;  Service: Endoscopy;  Laterality: N/A;    ESOPHAGOGASTRODUODENOSCOPY N/A 8/27/2019    Procedure: EGD (ESOPHAGOGASTRODUODENOSCOPY);  Surgeon: Addie John MD;  Location: [facility] ENDO;  Service: Endoscopy;  Laterality: N/A;    INSERTION OF INFLATABLE PENILE PROSTHESIS N/A 5/21/2024    Procedure: INSERTION, PENILE PROSTHESIS, INFLATABLE;  Surgeon: Bonifacio Shirley MD;  Location: [facility] OR;  Service: Urology;  Laterality: N/A;    RADIOFREQUENCY ABLATION  03/13/2017    Atrial tachycardia    REMOVAL OF GRAFT Left 7/2/2020    Procedure: REMOVAL, GRAFT;  Surgeon: Sascha Sidhu MD;  Location: [facility] OR;  Service: Peripheral Vascular;  Laterality: Left;    RIGHT HEART CATHETERIZATION Right 8/12/2021    Procedure: INSERTION, CATHETER, RIGHT HEART;  Surgeon: Mariah Pierce MD;  Location: [facility] CATH LAB;  Service: Cardiology;  Laterality: Right;       Family History   Problem Relation Name Age of Onset    Leukemia Mother      Cancer Mother      Heart attack Father          massive MI at age 67    No Known Problems Sister      No Known Problems Brother      No Known Problems Sister      No Known Problems Sister      Heart failure  Paternal Grandmother      Diabetes Paternal Aunt      Hypertension Paternal Aunt      Leukemia Paternal Aunt          CLL    Suicide Paternal Uncle      Anesthesia problems Paternal Uncle      Diabetes Paternal Aunt      Stroke Paternal Aunt      Valvular heart disease Maternal Aunt      Kidney disease Neg Hx      Colon cancer Neg Hx         Social History     Socioeconomic History    Marital status: Single   Tobacco Use    Smoking status: Never     Passive exposure: Never    Smokeless tobacco: Never   Substance and Sexual Activity    Alcohol use: Yes     Alcohol/week: 14.0 standard drinks of alcohol     Types: 14 Glasses of wine per week     Comment: Drinks 1-2 glasses of red wine - quit in 2016; does have heavy drinking history; started drinking at age 13; was drinking a 12 pack over the weekend and two 24 ounces of beer a day during the week along with a pint of hard alcohol    Drug use: No    Sexual activity: Not Currently     Partners: Female   Social History Narrative    Caregiver Nurse Marjan Swain; 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     Social Drivers of Health     Financial Resource Strain: Low Risk  (12/19/2024)    Overall Financial Resource Strain (CARDIA)     Difficulty of Paying Living Expenses: Not very hard   Food Insecurity: No Food Insecurity (12/19/2024)    Hunger Vital Sign     Worried About Running Out of Food in the Last Year: Never true     Ran Out of Food in the Last Year: Never true   Transportation Needs: No Transportation Needs (12/19/2024)    PRAPARE - Transportation     Lack of Transportation (Medical): No     Lack of Transportation (Non-Medical): No   Physical Activity: Inactive (12/19/2024)    Exercise Vital Sign     Days of Exercise per Week: 0 days     Minutes of Exercise per Session: 0 min   Stress: No Stress Concern Present (12/19/2024)    [employer] - Occupational Stress Questionnaire     Feeling of Stress : Only a little   Housing Stability: Unknown  (12/19/2024)    Housing Stability Vital Sign     Unable to Pay for Housing in the Last Year: No     Homeless in the Last Year: No       Current Medications[1]    Review of patient's allergies indicates:  No Known Allergies      There were no vitals filed for this visit.          Physical Exam   Nursing Notes and Vital Signs Reviewed.     Constitutional: Well developed, well nourished. AAOx3, NAD, speech/ comprehension clear   Head: Atraumatic. Normocephalic.   Eyes: PERRL. EOMI. Conjunctivae are not pale. No scleral icterus.   ENT: Mucous membranes are dry. No tongue tremors. Throat clear.  Neck: Supple. No JVD or LN or Carotid Bruits noted B.  Cardiovascular: S1S2 RRR, no murmurs, rubs, or gallops. Distal pulses are 2+ and symmetric.   Pulmonary/Chest: No evidence of respiratory distress. Clear to auscultation bilaterally. No wheezing, rales or rhonchi. No chest wall TTP.   Abdominal: Soft and non-distended. There is no tenderness. No rebound, guarding, or rigidity. No organomegaly. No mass or viscera palpable  Musculoskeletal: FROM in all extremities. No deformities, no TTP, no edema. No midline spinal TTP. No step-offs. Pelvis is stable to compression. No cyanosis. Moves all four extremities.   Skin: Skin is warm and dry.   Neurological: No gross neurological deficits, Strength 5/5 B, is equal in the upper and lower extremities bilaterally. No sensory deficits to light touch. No pronator drift.  DTRs are 2+ and equal throughout.   Psychiatric: Good eye contact. Normal Affect.      Laboratory Data:  Reviewed and noted in plan where applicable- Please see chart for full laboratory data.       Lab Results   Component Value Date    WBC 7.37 02/18/2025    HGB 10.7 (L) 02/18/2025    HCT 33.1 (L) 02/18/2025     (H) 02/18/2025     02/18/2025     BMP  Lab Results   Component Value Date     02/18/2025    K 4.1 02/18/2025    CL 97 02/18/2025    CO2 27 02/18/2025    BUN 50 (H) 02/18/2025    CREATININE  12.6 (H) 02/18/2025    CALCIUM 9.6 02/18/2025    ANIONGAP 16 02/18/2025    ESTGFRAFRICA 4.9 (A) 07/21/2022    EGFRNONAA 4.3 (A) 07/21/2022     CMP  Sodium   Date Value Ref Range Status   02/18/2025 140 136 - 145 mmol/L Final     Potassium   Date Value Ref Range Status   02/18/2025 4.1 3.5 - 5.1 mmol/L Final     Chloride   Date Value Ref Range Status   02/18/2025 97 95 - 110 mmol/L Final     CO2   Date Value Ref Range Status   02/18/2025 27 23 - 29 mmol/L Final     Glucose   Date Value Ref Range Status   02/18/2025 91 70 - 110 mg/dL Final     BUN   Date Value Ref Range Status   02/18/2025 50 (H) 6 - 20 mg/dL Final     Creatinine   Date Value Ref Range Status   02/18/2025 12.6 (H) 0.5 - 1.4 mg/dL Final     Calcium   Date Value Ref Range Status   02/18/2025 9.6 8.7 - 10.5 mg/dL Final     Total Protein   Date Value Ref Range Status   02/18/2025 8.2 6.0 - 8.4 g/dL Final     Albumin   Date Value Ref Range Status   02/18/2025 2.9 (L) 3.5 - 5.2 g/dL Final     Total Bilirubin   Date Value Ref Range Status   02/18/2025 0.6 0.1 - 1.0 mg/dL Final     Comment:     For infants and newborns, interpretation of results should be based  on gestational age, weight and in agreement with clinical  observations.    Premature Infant recommended reference ranges:  Up to 24 hours.............<8.0 mg/dL  Up to 48 hours............<12.0 mg/dL  3-5 days..................<15.0 mg/dL  6-29 days.................<15.0 mg/dL       Alkaline Phosphatase   Date Value Ref Range Status   02/18/2025 72 40 - 150 U/L Final     AST   Date Value Ref Range Status   02/18/2025 14 10 - 40 U/L Final     ALT   Date Value Ref Range Status   02/18/2025 10 10 - 44 U/L Final     Anion Gap   Date Value Ref Range Status   02/18/2025 16 8 - 16 mmol/L Final     eGFR if    Date Value Ref Range Status   07/21/2022 4.9 (A) >60 mL/min/1.73 m^2 Final     eGFR if non    Date Value Ref Range Status   07/21/2022 4.3 (A) >60 mL/min/1.73 m^2 Final      Comment:     Calculation used to obtain the estimated glomerular filtration  rate (eGFR) is the CKD-EPI equation.        Lab Results   Component Value Date    CALCIUM 9.6 02/18/2025    PHOS 5.9 (H) 05/25/2024     Lab Results   Component Value Date    K 4.1 02/18/2025     Lab Results   Component Value Date    LABPROT 25.5 (H) 02/18/2025    ALBUMIN 2.9 (L) 02/18/2025     Lab Results   Component Value Date    HGBA1C 4.9 03/12/2024       BMP  @[id number](GLU,NA,K,Cl,CO2,BUN,Creatinine,Calcium,MG)@      Radiology:  Reviewed and noted in plan where applicable- Please see chart for full radiology data.            ASSESSMENT/PLAN:     Problem List[2]      PLAN:      Assessment and plan:    1.  ESRD: Doing well on  dialysis. We will continue hemodialysis treatments three times a week, maintaining a URR of 70% or greater and a Kt/V of 1.20. Currently, the patient is stable.    2.  Anemia: We will check hemoglobin at least monthly, target range 10 to 11, transferrin saturation monthly, and ferritin quarterly. We will dose Epogen and iron according to monthly blood work and according to protocol.    3.  Hypertension: Currently controlled with current medications, sodium and fluid restrictions and dialysis prescription.     4.  Hyperparathyroidism secondary to renal origin: We will check intact PTH on a quarterly basis. We will dose vitamin D according to blood work and protocol.      Tara Yoon DNP                    [1]   Current Outpatient Medications   Medication Sig Dispense Refill    ascorbic acid, vitamin C, (VITAMIN C) 500 MG tablet Take 1 tablet (500 mg total) by mouth 2 (two) times daily.      B complex-vitamin C-folic acid (ETHAN-RACQUEL) 0.8 mg Tab Take 1 tablet by mouth once daily (Patient not taking: Reported on 3/18/2025) 30 tablet 6    B complex-vitamin C-folic acid (RENAL-RACQUEL) 0.8 mg Tab take 1 tablet by mouth once daily 30 tablet 6    calcium acetate,phosphat bind, (PHOSLO) 667 mg capsule Take 3  capsules by mouth 3 times daily with meals and 1 capsule by mouth twice daily with snacks 330 capsule 11    coenzyme Q10 (COQ-10) 100 mg capsule Take 1 capsule (100 mg total) by mouth once daily. 90 capsule 1    diltiaZEM (CARDIZEM LA) 180 mg 24 hr tablet Take 3 tablets (540 mg total) by mouth once daily. Stop Diltiazem 420mg 270 tablet 1    ferrous sulfate 324 mg (65 mg iron) TbEC Take 325 mg by mouth once daily.      gabapentin (NEURONTIN) 100 MG capsule Take 1 capsule by mouth every 8 hours 90 capsule 2    gabapentin (NEURONTIN) 300 MG capsule Take 1 capsule orally twice daily as needed for pain. 60 capsule 0    hydrALAZINE (APRESOLINE) 100 MG tablet Take 1 tablet (100 mg total) by mouth every 8 (eight) hours. 270 tablet 1    HYDROcodone-acetaminophen (NORCO) 7.5-325 mg per tablet as needed; Take 1 tablet by mouth every 12 hours 60 tablet 0    hydrOXYzine HCL (ATARAX) 25 MG tablet Take one tablet by mouth daily as needed for itching 90 tablet 3    multivitamin with minerals tablet Take 1 tablet by mouth once daily.      olmesartan (BENICAR) 40 MG tablet Take 1 tablet (40 mg total) by mouth every evening. 90 tablet 3    omega-3 fatty acids-vitamin E (FISH OIL) 1,000 mg Cap Take 1 capsule by mouth once daily.  0    pantoprazole (PROTONIX) 40 MG tablet Take 1 tablet by mouth once daily 90 tablet 3    pantoprazole (PROTONIX) 40 MG tablet Take 1 tablet by mouth once daily 30 tablet 3    rosuvastatin (CRESTOR) 10 MG tablet Take 1 tablet (10 mg total) by mouth once daily. 90 tablet 0    tenofovir disoproxil fumarate (VIREAD) 300 mg Tab TAKE 1 TABLET BY MOUTH ONCE WEEKLY 4 tablet 3    vitamin D (VITAMIN D3) 1000 units Tab Take 1 tablet by mouth daily 30 tablet 6    warfarin (COUMADIN) 5 MG tablet Take 1 tablet (5 mg) by mouth on Sunday, Tuesday and Thursday.  Using the 7.5 mg tablet, take 1 tablet (7.5 mg ) by mouth on Monday, Wednesday, Friday and Saturday or as directed by coumadin clinic. 12 tablet 11    warfarin  (COUMADIN) 7.5 MG tablet Take one tablet (7.5 mg) by mouth on Monday, Wednesday, Friday and Saturday.  Using the 5 mg tablets, take 1 tablet by mouth (5 mg) on Sunday, Tuesday and Thursday OR as directed by coumadin clinic. 20 tablet 11     No current facility-administered medications for this visit.     Facility-Administered Medications Ordered in Other Visits   Medication Dose Route Frequency Provider Last Rate Last Admin    0.9%  NaCl infusion   Intravenous Continuous Christopher Jane MD        sodium chloride 0.9% flush 10 mL  10 mL Intravenous PRN Christopher Jane MD       [2]   Patient Active Problem List  Diagnosis    Essential hypertension    Hypertensive cardiomegaly with heart failure    Pulmonary HTN    Adult congenital heart disease    History of mechanical aortic valve replacement    ESRD (end stage renal disease) on dialysis Q M,W,F    Chronic diastolic heart failure    Hx Atrial tachycardia    History of radiofrequency ablation for complex right atrial arrhythmia    Erectile dysfunction    Chronic pulmonary heart disease    Secondary hyperparathyroidism of renal origin    Paroxysmal atrial fibrillation    On Coumadin for atrial fibrillation    Special screening for malignant neoplasms, colon    Abnormal diffusion capacity determined by pulmonary function test    Mixed restrictive and obstructive lung disease    DANIEL (obstructive sleep apnea)    Nocturnal hypoxemia due to obstructive chronic bronchitis    Pulmonary nodule, left    Psychophysiological insomnia    Primary snoring    Periodic limb movement disorder (PLMD)    Inadequate sleep hygiene    Hx NSVT (nonsustained ventricular tachycardia)    Disorder of electrolytes    Anemia associated with chronic renal failure    Hepatic cirrhosis    Gross hematuria    Renal cyst    Aortic atherosclerosis    Chronic hepatitis B    Low vitamin D level    Hyperlipidemia    History of colon polyps    Thrombocytopenia    Preoperative clearance    GERD  (gastroesophageal reflux disease)    Transaminitis    Portal hypertension    Hx SVT (supraventricular tachycardia)    Neuromuscular dysfunction of bladder, unspecified

## 2025-04-10 DIAGNOSIS — I50.9 ACUTE HEART FAILURE, UNSPECIFIED HEART FAILURE TYPE: Primary | ICD-10-CM

## 2025-04-10 RX ORDER — DILTIAZEM HYDROCHLORIDE EXTENDED-RELEASE TABLETS 180 MG/1
540 TABLET, EXTENDED RELEASE ORAL DAILY
Qty: 270 TABLET | Refills: 1 | Status: SHIPPED | OUTPATIENT
Start: 2025-04-10 | End: 2025-04-11

## 2025-04-11 RX ORDER — DILTIAZEM HYDROCHLORIDE 180 MG/1
540 CAPSULE, COATED, EXTENDED RELEASE ORAL DAILY
Qty: 270 CAPSULE | Refills: 1 | Status: SHIPPED | OUTPATIENT
Start: 2025-04-11 | End: 2026-04-11

## 2025-04-17 ENCOUNTER — ANTI-COAG VISIT (OUTPATIENT)
Dept: CARDIOLOGY | Facility: CLINIC | Age: 55
End: 2025-04-17
Payer: MEDICARE

## 2025-04-17 DIAGNOSIS — I48.0 PAROXYSMAL ATRIAL FIBRILLATION: ICD-10-CM

## 2025-04-17 DIAGNOSIS — I48.91 ON COUMADIN FOR ATRIAL FIBRILLATION: ICD-10-CM

## 2025-04-17 DIAGNOSIS — Z79.01 ON COUMADIN FOR ATRIAL FIBRILLATION: ICD-10-CM

## 2025-04-17 DIAGNOSIS — Z95.2 HISTORY OF MECHANICAL AORTIC VALVE REPLACEMENT: ICD-10-CM

## 2025-04-17 DIAGNOSIS — Z79.01 LONG TERM (CURRENT) USE OF ANTICOAGULANTS: Primary | ICD-10-CM

## 2025-04-17 LAB
CTP QC/QA: YES
INR PPP: 2.1 (ref 2–3)

## 2025-04-17 PROCEDURE — 99999PBSHW POCT INR: Mod: PBBFAC,,,

## 2025-04-17 PROCEDURE — 85610 PROTHROMBIN TIME: CPT | Mod: PBBFAC

## 2025-04-17 NOTE — PROGRESS NOTES
Patient's INR is therapeutic at 2.1. Patient reports no changes. Instructions given: Continue warfarin 5 mg on Thursdays, Sundays ,and Tuesdays ; and 7.5 mg all other days. Recheck on 5/13/25. Calendar reviewed with patient. Patient verbalizes understanding.

## 2025-04-17 NOTE — CONSULTS
[facility]  Hospital Medicine  Consult Note    Patient Name: Isidro White Jr.  MRN: [id number]  Admission Date: 5/19/2024  Hospital Length of Stay: 1 days  Attending Physician: Bonifacio Shirley MD   Primary Care Provider: Boston Nevarez MD           Patient information was obtained from patient, past medical records, and ER records.     Consults  Subjective:     Principal Problem: Preoperative clearance    Chief Complaint: No chief complaint on file.       HPI: Isidro White Jr. is a 54 y.o. 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  Presented as a direct admit from Dr. Shirley with urology for scheduled penile prosthesis procedure.  Hospital Medicine consulted to manage medical problems and to bridge Lovenox and Coumadin for procedure.  Patient denies any complaints at this time.    PCP: Boston Nevarez    Past Medical History:   Diagnosis Date    Anemia     Aortic valve stenosis     s/p mechanical AVR    Atrial fibrillation     Atrial flutter     Cardiomyopathy     CHF (congestive heart failure)     Drug-induced erectile dysfunction     ESRD due to hypertension     HD M, W, F    ESRD on dialysis     GERD (gastroesophageal reflux disease)     Hepatitis B     Hyperlipidemia     Hypertension     Nightmares     Obesity     DANIEL (obstructive sleep apnea) 11/12/2019    Secondary hyperparathyroidism of renal origin     Supraventricular tachycardia     atrial tachycardia    Tachycardia     Valvular regurgitation     AI, TR       Past  Surgical History:   Procedure Laterality Date    ASD REPAIR      age 8 [facility]    AV Graft Creation Left 03/2017    CARDIAC CATHETERIZATION      [facility]     CARDIAC VALVE SURGERY  02/08/2017    22 mm Medtronic AV, 28 mm TV Medtronic annuloplaty ring    COLONOSCOPY N/A 8/27/2019    Procedure: COLONOSCOPY;  Surgeon: Addie John MD;  Location: [facility] ENDO;  Service: Endoscopy;  Laterality: N/A;  dialysis pt *needs K*    COLONOSCOPY N/A 9/3/2020    Procedure: COLONOSCOPY-need INR;  Surgeon: Jemma Youngblood MD;  Location: [facility] ENDO;  Service: Endoscopy;  Laterality: N/A;    ESOPHAGOGASTRODUODENOSCOPY N/A 8/27/2019    Procedure: EGD (ESOPHAGOGASTRODUODENOSCOPY);  Surgeon: Addie John MD;  Location: [facility] ENDO;  Service: Endoscopy;  Laterality: N/A;    RADIOFREQUENCY ABLATION  03/13/2017    Atrial tachycardia    REMOVAL OF GRAFT Left 7/2/2020    Procedure: REMOVAL, GRAFT;  Surgeon: Sascha Sidhu MD;  Location: [facility] OR;  Service: Peripheral Vascular;  Laterality: Left;    RIGHT HEART CATHETERIZATION Right 8/12/2021    Procedure: INSERTION, CATHETER, RIGHT HEART;  Surgeon: Mariah Pierce MD;  Location: [facility] CATH LAB;  Service: Cardiology;  Laterality: Right;       Review of patient's allergies indicates:  No Known Allergies    Current Facility-Administered Medications on File Prior to Encounter   Medication    0.9%  NaCl infusion    sodium chloride 0.9% flush 10 mL     Current Outpatient Medications on File Prior to Encounter   Medication Sig    albuterol (VENTOLIN HFA) 90 mcg/actuation inhaler Inhale 2 puffs into the lungs every 6 (six) hours as needed for Wheezing or Shortness of Breath. Rescue (Patient not taking: Reported on 1/25/2024)    amLODIPine (NORVASC) 10 MG tablet Take 1 tablet by mouth daily    ascorbic acid, vitamin C, (VITAMIN C) 500 MG tablet Take 1 tablet (500 mg total) by mouth 2 (two) times daily.    b complex vitamins tablet Take 1 tablet by mouth once daily.    B complex-vitamin  C-folic acid (ETHAN-RACQUEL) 0.8 mg Tab Take 1 tablet by mouth once daily    calcium acetate,phosphat bind, (PHOSLO) 667 mg capsule Take 3 capsules by mouth 3 times daily with meals and 1 capsule by mouth twice daily with snacks    calcium acetate,phosphat bind, (PHOSLO) 667 mg tablet Take 3 capsules by mouth three times daily with meals AND take 1 capsule by mouth twice daily with snacks    dialysis solutions (PERITONEAL DIALYSIS SOLUTION IP) Inject into the peritoneum every Mon, Wed, Fri. Lt FA Fistula, [facility] on M, W, F.    diltiaZEM (TIADYLT ER) 420 MG Cs24 Take 1 capsule (420 mg total) by mouth once daily.    epoetin vikki (PROCRIT) 10,000 unit/mL injection Inject 1 mL (10,000 Units total) into the skin every Mon, Wed, Fri. To be given with HD    ferrous sulfate 324 mg (65 mg iron) TbEC Take 325 mg by mouth once daily.    hydrALAZINE (APRESOLINE) 100 MG tablet Take 1 tablet by mouth three times a day including dialysis days    hydrOXYzine HCL (ATARAX) 25 MG tablet Take 1 tablet by mouth daily as needed for itching    hydrOXYzine HCL (ATARAX) 25 MG tablet take one tablet by mouth daily as needed for itching    labetaloL (NORMODYNE) 200 MG tablet Take 1 tablet (200 mg total) by mouth 3 (three) times daily.    lisinopriL (PRINIVIL,ZESTRIL) 40 MG tablet Take 1 tablet (40 mg total) by mouth once daily.    multivitamin with minerals tablet Take 1 tablet by mouth once daily.    omega-3 fatty acids-vitamin E (FISH OIL) 1,000 mg Cap Take 1 capsule by mouth once daily.    pantoprazole (PROTONIX) 40 MG tablet Take 1 tablet by mouth once daily    pep injection Inject 0.3 ml as directed     For compounding pharmacy use:   Add PAPAVERINE 30 mcg  Add PHENTOLAMINE 10 mg  Add ALPROSTADIL 100 mcg    rosuvastatin (CRESTOR) 10 MG tablet Take 1 tablet (10 mg total) by mouth once daily.    sildenafiL (VIAGRA) 100 MG tablet Take 1 tablet (100 mg total) by mouth daily as needed for Erectile Dysfunction.    tenofovir  Continue Regimen: oxybutynin chloride 5 mg tablet : Take one tablet by mouth once in the morning, once at night. disoproxil fumarate (VIREAD) 300 mg Tab TAKE 1 TABLET BY MOUTH ONCE WEEKLY    tenofovir disoproxil fumarate (VIREAD) 300 mg Tab Take 1 tablet (300 mg total) by mouth once a week.    vitamin D (VITAMIN D3) 1000 units Tab Take 1 tablet by mouth daily    warfarin (COUMADIN) 7.5 MG tablet Take 2 tablets by mouth on Tuesday, Thursdays, and Saturday; and 1 tablet on all other days of the week or as directed by coumadin clinic.    warfarin (COUMADIN) 7.5 MG tablet Take 1 tablet (7.5 mg) on Mondays, Wednesdays and Fridays; and 1 and 1/2 tablets (11.25 mg) all other days OR AS DIRECTED BY COUMADIN CLINIC     Family History       Problem Relation (Age of Onset)    Anesthesia problems Paternal Uncle    Cancer Mother    Diabetes Paternal Aunt, Paternal Aunt    Heart attack Father    Heart failure Paternal Grandmother    Hypertension Paternal Aunt    Leukemia Mother, Paternal Aunt    No Known Problems Sister, Brother, Sister, Sister    Stroke Paternal Aunt    Suicide Paternal Uncle    Valvular heart disease Maternal Aunt          Tobacco Use    Smoking status: Never    Smokeless tobacco: Never   Substance and Sexual Activity    Alcohol use: No     Comment: quit in 2016; does have heavy drinking history; started drinking at age 13; was drinking a 12 pack over the weekend and two 24 ounces of beer a day during the week along with a pint of hard alcohol    Drug use: No    Sexual activity: Not Currently     Review of Systems   Respiratory:  Negative for cough, shortness of breath and wheezing.    Cardiovascular:  Negative for chest pain, palpitations and leg swelling.   Genitourinary:  Negative for difficulty urinating, dysuria, hematuria, penile discharge and penile pain.   All other systems reviewed and are negative.    Objective:     Vital Signs (Most Recent):  Temp: 97.8 °F (36.6 °C) (05/20/24 0409)  Pulse: 76 (05/20/24 0409)  Resp: 18 (05/20/24 0409)  BP: (!) 156/74 (05/20/24 0409)  SpO2: 95 % (05/20/24 0409) Vital Signs (24h  Detail Level: Zone Range):  Temp:  [97.3 °F (36.3 °C)-97.8 °F (36.6 °C)] 97.8 °F (36.6 °C)  Pulse:  [75-76] 76  Resp:  [18] 18  SpO2:  [92 %-95 %] 95 %  BP: (156-158)/(74-76) 156/74     Weight: 104.5 kg (230 lb 6.1 oz)  Body mass index is 29.58 kg/m².     Physical Exam  Vitals and nursing note reviewed.   Constitutional:       General: He is awake. He is not in acute distress.     Appearance: Normal appearance. He is well-developed and well-groomed. He is not ill-appearing, toxic-appearing or diaphoretic.   HENT:      Head: Normocephalic and atraumatic.      Mouth/Throat:      Mouth: Mucous membranes are moist.      Pharynx: Oropharynx is clear.   Eyes:      Extraocular Movements: Extraocular movements intact.      Conjunctiva/sclera: Conjunctivae normal.   Cardiovascular:      Rate and Rhythm: Normal rate and regular rhythm.      Pulses: Normal pulses.      Heart sounds: Murmur heard.      Arteriovenous access: Left arteriovenous access is present.     Comments:   Left lower extremity with positive bruit and thrill noted  Pulmonary:      Effort: Pulmonary effort is normal.      Breath sounds: Normal breath sounds. No decreased breath sounds, wheezing, rhonchi or rales.   Abdominal:      General: Bowel sounds are normal.      Palpations: Abdomen is soft.      Tenderness: There is no abdominal tenderness.   Musculoskeletal:      Cervical back: Normal range of motion and neck supple.      Right lower leg: No edema.      Left lower leg: No edema.      Comments: 5/5 strength throughout   Skin:     General: Skin is warm and dry.      Capillary Refill: Capillary refill takes less than 2 seconds.   Neurological:      General: No focal deficit present.      Mental Status: He is alert and oriented to person, place, and time. Mental status is at baseline.      GCS: GCS eye subscore is 4. GCS verbal subscore is 5. GCS motor subscore is 6.      Cranial Nerves: Cranial nerves 2-12 are intact.      Sensory: Sensation is intact.   Psychiatric:          Mood and Affect: Mood normal.         Behavior: Behavior normal. Behavior is cooperative.        LABS:  No results found for this or any previous visit (from the past 24 hour(s)).    RADIOLOGY  Echo    Result Date: 5/2/2024    Left Ventricle: The left ventricle is normal in size. Normal wall thickness. There is concentric remodeling. Normal wall motion. Ejection fraction by visual approximation is 60%. Grade II diastolic dysfunction.   Right Ventricle: Normal right ventricular cavity size. Wall thickness is normal. Systolic function is normal.   Left Atrium: Left atrium is severely dilated.   Aortic Valve: There is a tilting disc prosthetic valve in the aortic position.   Mitral Valve: Moderately calcified leaflets. Moderately restricted motion. There is mild to moderate stenosis. MVA 2.6 cm2 by planimetry, 1.6 cm2 by PHT.  The mean pressure gradient across the mitral valve is 11 mmHg at a heart rate of  bpm. There is mild regurgitation.   Tricuspid Valve: There is mild to moderate regurgitation.   Pulmonary Artery: There is pulmonary hypertension. The estimated pulmonary artery systolic pressure is 70 mmHg.   IVC/SVC: Normal venous pressure at 3 mmHg.       EKG    MICROBIOLOGY    MDM     Amount and/or Complexity of Data Reviewed  Clinical lab tests: reviewed  Tests in the radiology section of CPT®: reviewed  Tests in the medicine section of CPT®: reviewed  Discussion of test results with the performing providers: yes  Decide to obtain previous medical records or to obtain history from someone other than the patient: yes  Obtain history from someone other than the patient: yes  Review and summarize past medical records: yes  Discuss the patient with other providers: yes  Independent visualization of images, tracings, or specimens: yes        Assessment/Plan:     * Preoperative clearance  Admitted for heparin bridge from coumadin for scheduled penile prosthesis surgery to be performed by Dr. Shirley.  [facility]  revealed class 4 risk with 15.0% 30 day risk of death, mi, cardiac arrest.  Plan:  -pre-op labs and imaging  -lovenox bridge for coumadin  -manage co-morbidities   -cards consult for surgical clearance  -urology primary team      ESRD (end stage renal disease) on dialysis  Creatine stable for now. BMP reviewed- noted CrCl cannot be calculated (Patient's most recent lab result is older than the maximum 7 days allowed.). according to latest data. Based on current GFR, CKD stage is end stage.  Monitor UOP and serial BMP and adjust therapy as needed. Renally dose meds. Avoid nephrotoxic medications and procedures. Nephrology consult for HD while in hospital.    History of mechanical aortic valve replacement  Plan:  -continue anticoagulation therpay with  Lovenox while in hospital  -tele monitoring  -cards consulted for surgical clearance      Chronic diastolic heart failure  Patient is identified as having Diastolic (HFpEF) heart failure that is Chronic. CHF is currently controlled. Latest ECHO performed and demonstrates- Results for orders placed during the hospital encounter of 05/02/24    Echo    Interpretation Summary    Left Ventricle: The left ventricle is normal in size. Normal wall thickness. There is concentric remodeling. Normal wall motion. Ejection fraction by visual approximation is 60%. Grade II diastolic dysfunction.    Right Ventricle: Normal right ventricular cavity size. Wall thickness is normal. Systolic function is normal.    Left Atrium: Left atrium is severely dilated.    Aortic Valve: There is a tilting disc prosthetic valve in the aortic position.    Mitral Valve: Moderately calcified leaflets. Moderately restricted motion. There is mild to moderate stenosis. MVA 2.6 cm2 by planimetry, 1.6 cm2 by PHT.  The mean pressure gradient across the mitral valve is 11 mmHg at a heart rate of  bpm. There is mild regurgitation.    Tricuspid Valve: There is mild to moderate regurgitation.    Pulmonary Artery:  "There is pulmonary hypertension. The estimated pulmonary artery systolic pressure is 70 mmHg.    IVC/SVC: Normal venous pressure at 3 mmHg.  . Continue Beta Blocker ACE/ARB and monitor clinical status closely. Monitor on telemetry. Patient is off CHF pathway.  Monitor strict Is&Os and daily weights.  Place on fluid restriction of 1.5 L. Continue to stress to patient importance of self efficacy and  on diet for CHF. Last BNP reviewed- and noted below No results for input(s): "BNP", "BNPTRIAGEBLO" in the last 168 hours..            Essential hypertension  Chronic, controlled. Latest blood pressure and vitals reviewed-     Temp:  [97.3 °F (36.3 °C)-97.8 °F (36.6 °C)]   Pulse:  [75-76]   Resp:  [18]   BP: (156-158)/(74-76)   SpO2:  [92 %-95 %] .   Home meds for hypertension were reviewed and noted below.   Hypertension Medications               amLODIPine (NORVASC) 10 MG tablet Take 1 tablet by mouth daily    diltiaZEM (TIADYLT ER) 420 MG Cs24 Take 1 capsule (420 mg total) by mouth once daily.    hydrALAZINE (APRESOLINE) 100 MG tablet Take 1 tablet by mouth three times a day including dialysis days    labetaloL (NORMODYNE) 200 MG tablet Take 1 tablet (200 mg total) by mouth 3 (three) times daily.    lisinopriL (PRINIVIL,ZESTRIL) 40 MG tablet Take 1 tablet (40 mg total) by mouth once daily.    pep injection Inject 0.3 ml as directed     For compounding pharmacy use:   Add PAPAVERINE 30 mcg  Add PHENTOLAMINE 10 mg  Add ALPROSTADIL 100 mcg            While in the hospital, will manage blood pressure as follows; Continue home antihypertensive regimen    Will utilize p.r.n. blood pressure medication only if patient's blood pressure greater than 160/100 and he develops symptoms such as worsening chest pain or shortness of breath.    Atrial fibrillation  Patient with Paroxysmal (<7 days) atrial fibrillation which is controlled currently with Beta Blocker and Calcium Channel Blocker. Patient is currently in sinus " rhythm.[id number] Score: The patient doesn't have any registry metric data available. HASBLED Score: . Anticoagulation indicated. Anticoagulation done with coumadin. Briding with lovenox for  penile prosthesis surgery .    Chronic hepatitis B  Patient has chronic liver disease due to hepatitis b. Their liver disease is compensated. Hepatology has not been consulted. We will obtain daily CBC, CMP, and INR.Will continue Tenofovir. Their most current Na-MELD is listed below.  MELD 3.0: 24 at 10/10/2023  9:27 AM  MELD-Na: 26 at 10/10/2023  9:27 AM  Calculated from:  Serum Creatinine: On dialysis. Using the maximum value.  Serum Sodium: 143 mmol/L (Using max of 137 mmol/L) at 10/10/2023  8:31 AM  Total Bilirubin: 0.5 mg/dL (Using min of 1 mg/dL) at 10/10/2023  8:31 AM  Serum Albumin: 3.3 g/dL at 10/10/2023  8:31 AM  INR(ratio): 1.8 at 10/10/2023  9:27 AM  Age at listing (hypothetical): 53 years  Sex: Male at 10/10/2023  9:27 AM         GERD (gastroesophageal reflux disease)  Chronic. Stable. Currently asymptomatic. Home medications include PPI/Antacids as needed.  Plan:  -Continue PPI/Antacids as needed         Hypertriglyceridemia  Patient is chronically on statin.will continue for now. Last Lipid Panel:   Lab Results   Component Value Date    CHOL 248 (H) 03/12/2024    HDL 29 (L) 03/12/2024    LDLCALC 170.0 (H) 03/12/2024    TRIG 245 (H) 03/12/2024    CHOLHDL 11.7 (L) 03/12/2024     Plan:  -Continue home medication  -low fat/low calorie diet          VTE Risk Mitigation (From admission, onward)           Ordered     enoxaparin injection 40 mg  Every 12 hours         05/19/24 2248     IP VTE HIGH RISK PATIENT  Once         05/19/24 2103     Place sequential compression device  Until discontinued         05/19/24 2103     Reason for No Pharmacological VTE Prophylaxis  Once        Question:  Reasons:  Answer:  Physician Provided (leave comment)  Comment:  pending surgical intervention    05/19/24 2103                     Thank you for your consult. Hospital Medicine will follow-up with patient. Please contact us if you have any additional questions.    Nimesh Rosas NP  Department of Hospital Medicine   [facility]

## 2025-04-30 RX ORDER — DILTIAZEM HYDROCHLORIDE 180 MG/1
540 CAPSULE, COATED, EXTENDED RELEASE ORAL DAILY
Qty: 270 CAPSULE | Refills: 3 | Status: SHIPPED | OUTPATIENT
Start: 2025-04-30 | End: 2025-05-03

## 2025-05-03 ENCOUNTER — HOSPITAL ENCOUNTER (EMERGENCY)
Facility: HOSPITAL | Age: 55
Discharge: LEFT BEFORE DISCHARGE | End: 2025-05-03
Attending: FAMILY MEDICINE
Payer: MEDICARE

## 2025-05-03 VITALS
WEIGHT: 206.19 LBS | TEMPERATURE: 98 F | BODY MASS INDEX: 26.47 KG/M2 | DIASTOLIC BLOOD PRESSURE: 68 MMHG | RESPIRATION RATE: 20 BRPM | OXYGEN SATURATION: 97 % | SYSTOLIC BLOOD PRESSURE: 118 MMHG | HEART RATE: 161 BPM

## 2025-05-03 DIAGNOSIS — I47.10 SVT (SUPRAVENTRICULAR TACHYCARDIA): Primary | ICD-10-CM

## 2025-05-03 DIAGNOSIS — R00.0 TACHYCARDIA: ICD-10-CM

## 2025-05-03 LAB
OHS QRS DURATION: 116 MS
OHS QTC CALCULATION: 531 MS

## 2025-05-03 PROCEDURE — 93005 ELECTROCARDIOGRAM TRACING: CPT

## 2025-05-03 PROCEDURE — 99283 EMERGENCY DEPT VISIT LOW MDM: CPT | Mod: 25

## 2025-05-03 PROCEDURE — 93010 ELECTROCARDIOGRAM REPORT: CPT | Mod: ,,, | Performed by: INTERNAL MEDICINE

## 2025-05-03 RX ORDER — DILTIAZEM HYDROCHLORIDE 180 MG/1
540 CAPSULE, COATED, EXTENDED RELEASE ORAL DAILY
Qty: 270 CAPSULE | Refills: 3 | Status: SHIPPED | OUTPATIENT
Start: 2025-05-03 | End: 2026-05-03

## 2025-05-03 NOTE — ED PROVIDER NOTES
SCRIBE #1 NOTE: I, Ryder Lee, am scribing for, and in the presence of, Louisa Snell MD. I have scribed the entire note.       History     Chief Complaint   Patient presents with    Tachycardia     Pt upset crying at triage stating all he wants is his medicine from Dr. Gudino. Pt has been out of medication 2 days. Told pt that he needs to see ER doctor today due to elevated  and he started punching his legs and crying.     Review of patient's allergies indicates:  No Known Allergies      History of Present Illness     HPI    5/3/2025, 10:09 AM  History obtained from the patient and medical records      History of Present Illness: Isidro White Jr. is a 55 y.o. male patient with a PMHx of HTN, SVT, cardiomyopathy, CHF, ESRD, HLD, hepatitis B, CHF, atrial flutter, GERD, and anemia who presents to the Emergency Department for heart prescriptions that need to be filled from Dr. Gudino. Pt is having difficulty getting them filled due to insurance. Pt is requesting to have his medications sent to [facility] and to be signed out AMA. Pt's heart rate is elevated in the 160s with some palpitations. No further complaints or concerns at this time.       Arrival mode: Personal Transportation    PCP: Boston Nevarez MD        Past Medical History:  Past Medical History:   Diagnosis Date    Anemia     Aortic valve stenosis     s/p mechanical AVR    Atrial fibrillation     Atrial flutter     Cardiomyopathy     CHF (congestive heart failure)     Drug-induced erectile dysfunction     ESRD due to hypertension     HD M, W, F    ESRD on dialysis     GERD (gastroesophageal reflux disease)     Hepatitis B     Hyperlipidemia     Hypertension     Nightmares     Obesity     DANIEL (obstructive sleep apnea) 11/12/2019    Secondary hyperparathyroidism of renal origin     Supraventricular tachycardia     atrial tachycardia    Tachycardia     Valvular regurgitation     AI, TR       Past Surgical History:  Past Surgical  History:   Procedure Laterality Date    ASD REPAIR      age 8 [facility]    AV Graft Creation Left 03/2017    CARDIAC CATHETERIZATION      [facility]     CARDIAC VALVE SURGERY  02/08/2017    22 mm Medtronic AV, 28 mm TV Medtronic annuloplaty ring    COLONOSCOPY N/A 8/27/2019    Procedure: COLONOSCOPY;  Surgeon: Addie John MD;  Location: [facility];  Service: Endoscopy;  Laterality: N/A;  dialysis pt *needs K*    COLONOSCOPY N/A 9/3/2020    Procedure: COLONOSCOPY-need INR;  Surgeon: Jemma Youngblood MD;  Location: [facility] ENDO;  Service: Endoscopy;  Laterality: N/A;    ESOPHAGOGASTRODUODENOSCOPY N/A 8/27/2019    Procedure: EGD (ESOPHAGOGASTRODUODENOSCOPY);  Surgeon: Addie John MD;  Location: [facility];  Service: Endoscopy;  Laterality: N/A;    INSERTION OF INFLATABLE PENILE PROSTHESIS N/A 5/21/2024    Procedure: INSERTION, PENILE PROSTHESIS, INFLATABLE;  Surgeon: Bonifacio Shirley MD;  Location: [facility] OR;  Service: Urology;  Laterality: N/A;    RADIOFREQUENCY ABLATION  03/13/2017    Atrial tachycardia    REMOVAL OF GRAFT Left 7/2/2020    Procedure: REMOVAL, GRAFT;  Surgeon: Sascha Sidhu MD;  Location: [facility] OR;  Service: Peripheral Vascular;  Laterality: Left;    RIGHT HEART CATHETERIZATION Right 8/12/2021    Procedure: INSERTION, CATHETER, RIGHT HEART;  Surgeon: Mariah Pierce MD;  Location: [facility] CATH LAB;  Service: Cardiology;  Laterality: Right;         Family History:  Family History   Problem Relation Name Age of Onset    Leukemia Mother      Cancer Mother      Heart attack Father          massive MI at age 67    No Known Problems Sister      No Known Problems Brother      No Known Problems Sister      No Known Problems Sister      Heart failure Paternal Grandmother      Diabetes Paternal Aunt      Hypertension Paternal Aunt      Leukemia Paternal Aunt          CLL    Suicide Paternal Uncle      Anesthesia problems Paternal Uncle      Diabetes Paternal Aunt      Stroke Paternal Aunt       Valvular heart disease Maternal Aunt      Kidney disease Neg Hx      Colon cancer Neg Hx         Social History:  Social History     Tobacco Use    Smoking status: Never     Passive exposure: Never    Smokeless tobacco: Never   Substance and Sexual Activity    Alcohol use: Yes     Alcohol/week: 14.0 standard drinks of alcohol     Types: 14 Glasses of wine per week     Comment: Drinks 1-2 glasses of red wine - quit in 2016; does have heavy drinking history; started drinking at age 13; was drinking a 12 pack over the weekend and two 24 ounces of beer a day during the week along with a pint of hard alcohol    Drug use: No    Sexual activity: Not Currently     Partners: Female        Review of Systems     Review of Systems   Constitutional:  Negative for chills and fever.   HENT:  Negative for congestion and sore throat.    Respiratory:  Negative for cough and shortness of breath.    Cardiovascular:  Positive for palpitations. Negative for chest pain.        (+) elevated HR   Gastrointestinal:  Negative for abdominal pain, nausea and vomiting.   Genitourinary:  Negative for dysuria.   Musculoskeletal:  Negative for back pain.   Skin:  Negative for rash.   Neurological:  Negative for dizziness, weakness, light-headedness and headaches.   Hematological:  Does not bruise/bleed easily.   All other systems reviewed and are negative.       Physical Exam     Initial Vitals [05/03/25 0958]   BP Pulse Resp Temp SpO2   118/68 (!) 161 20 97.8 °F (36.6 °C) 97 %      MAP       --          Physical Exam  Nursing Notes and Vital Signs Reviewed.  Constitutional: Patient is in no acute distress. Well-developed and well-nourished.  Head: Atraumatic. Normocephalic.  Eyes: PERRL. EOM intact. Conjunctivae are not pale. No scleral icterus.  ENT: Mucous membranes are moist. Oropharynx is clear and symmetric.    Neck: Supple. Full ROM. No lymphadenopathy.  Cardiovascular: SVT. No murmurs, rubs, or gallops. Distal pulses are 2+ and  symmetric.  Pulmonary/Chest: No respiratory distress. Clear to auscultation bilaterally. No wheezing or rales.  Abdominal: Soft and non-distended.  There is no tenderness.  No rebound, guarding, or rigidity. Good bowel sounds.  Genitourinary: No CVA tenderness.  Musculoskeletal: Moves all extremities. No obvious deformities. No edema. No calf tenderness.  Skin: Warm and dry.  Neurological:  Alert, awake, and appropriate.  Normal speech.  No acute focal neurological deficits are appreciated.  Psychiatric: Normal affect. Good eye contact. Appropriate in content.       ED Course   Procedures  ED Vital Signs:  Vitals:    05/03/25 0958   BP: 118/68   Pulse: (!) 161   Resp: 20   Temp: 97.8 °F (36.6 °C)   TempSrc: Oral   SpO2: 97%   Weight: 93.5 kg (206 lb 3.2 oz)       Abnormal Lab Results:  Labs Reviewed - No data to display          Imaging Results:  Imaging Results    None          The EKG was ordered, reviewed, and independently interpreted by the ED provider.  Interpretation time: 190:02  Rate: 156 BPM  Rhythm: supraventricular tachycardia (SVT)  Interpretation: Left posterior fascicular block. Minimal voltage criteria for LVH, may be normal variant (Clive product). Abnormal QRS-T angle, consider primary T wave abnormality. No STEMI.              The Emergency Provider reviewed the vital signs and test results, which are outlined above.     ED Discussion     10:10 AM: Pt is A&O x3, appropriate, and of capacity at this time. Pt voices desire to leave hospital. D/w pt in length need for further evaluation and treatment due to HPI and PEx. Pt declines any further evaluation or tx at this time. All risks, including worsening sx, permanent bodily harm and death, were discussed in length. Pt acknowledges all risk at this time and agrees to sign AMA form. Pt given RTER instructions. All questions and concerns addressed at this time. Pt leaving AMA.            Medical Decision Making  Amount and/or Complexity of Data  Reviewed  ECG/medicine tests: ordered and independent interpretation performed. Decision-making details documented in ED Course.    Risk  Prescription drug management.                ED Medication(s):  Medications - No data to display    Discharge Medication List as of 5/3/2025 12:57 PM                  Scribe Attestation:   Scribe #1: I performed the above scribed service and the documentation accurately describes the services I performed. I attest to the accuracy of the note.     Attending:   Physician Attestation Statement for Scribe #1: I, Louisa Snell MD, personally performed the services described in this documentation, as scribed by Ryder Lee, in my presence, and it is both accurate and complete.           Clinical Impression       ICD-10-CM ICD-9-CM   1. SVT (supraventricular tachycardia)  I47.10 427.89   2. Tachycardia  R00.0 785.0       Disposition:   Disposition: Discharged  Condition: Stable         Louisa Snell MD  05/05/25 1020

## 2025-05-03 NOTE — ED NOTES
Patient refused cardiac monitoring, pulse oximetry, and blood pressure cuff. Patient refused IV and blood work. He states all he needs is a refill of his medication. Patient to sign out AMA per Dr. Snell. Patient verbalizes understanding of risks associated with leaving against medical advice and refusing all treatment. AMA form signed. Prescription refill given to patient per Dr. Snell. Patient refused reassessment of vital signs, he is awake, alert, and oriented x4, respirations are even and unlabored. He does not appear to be in distress. He is ambulatory without difficulty, with a steady gait.

## 2025-05-06 ENCOUNTER — OFFICE VISIT (OUTPATIENT)
Dept: CARDIOTHORACIC SURGERY | Facility: CLINIC | Age: 55
End: 2025-05-06
Payer: MEDICARE

## 2025-05-06 VITALS
OXYGEN SATURATION: 96 % | SYSTOLIC BLOOD PRESSURE: 147 MMHG | HEART RATE: 82 BPM | WEIGHT: 210.44 LBS | BODY MASS INDEX: 27.01 KG/M2 | DIASTOLIC BLOOD PRESSURE: 70 MMHG | HEIGHT: 74 IN

## 2025-05-06 DIAGNOSIS — I27.20 PULMONARY HTN: ICD-10-CM

## 2025-05-06 DIAGNOSIS — I34.2 NONRHEUMATIC MITRAL VALVE STENOSIS: ICD-10-CM

## 2025-05-06 DIAGNOSIS — Z95.2 HISTORY OF MECHANICAL AORTIC VALVE REPLACEMENT: Primary | ICD-10-CM

## 2025-05-06 DIAGNOSIS — I05.0 SEVERE MITRAL VALVE STENOSIS: Primary | ICD-10-CM

## 2025-05-06 DIAGNOSIS — I05.0 SEVERE MITRAL VALVE STENOSIS: ICD-10-CM

## 2025-05-06 PROCEDURE — 99999 PR PBB SHADOW E&M-EST. PATIENT-LVL IV: CPT | Mod: PBBFAC,,, | Performed by: THORACIC SURGERY (CARDIOTHORACIC VASCULAR SURGERY)

## 2025-05-06 PROCEDURE — 99214 OFFICE O/P EST MOD 30 MIN: CPT | Mod: PBBFAC | Performed by: THORACIC SURGERY (CARDIOTHORACIC VASCULAR SURGERY)

## 2025-05-06 NOTE — PROGRESS NOTES
Subjective:      Patient ID: Isidro White Jr. is a 55 y.o. male.    Chief Complaint: No chief complaint on file.      HPI:  Isidro White Jr. is a 55 y.o. male who presents surgical evaluation for severe mitral stenosis. Medical conditions include  former alcohol abuse, HBV cirrhosis, PAF, ASD closure at age 8 ([facility]), HFpEF, s/p AVR with a 22 mm mechanical valve and TV annuloplasty with a 28 mm ring 3/2017 with Dr. Gastelum, HTN, PHTN, Mitral stenosis, SVT, EP performed RFA (3/13/17), SVT, CHF,  and has been on HD - MWF since Feb 8,2016.   Patient is followed by Dr. Gudino. Underwent surveillance echo that show progressive mitral stenosis that is now severe. MG 21. PAP 85.. He reports worsening dyspnea on exertion and fatigue that started 3 months ago. Also has palpitations. Denies chest pain, LE edema or orthopnea. No smoking or alcohol use.     Went to ED yesterday for heart medication refill. Pt is having difficulty getting them filled due to insurance. Pt is requesting to have his medications sent to [facility] and to be signed out AMA. Pt's heart rate is elevated in the 160s with some palpitations. found to be in SVT     Family and social history reviewed    Review of patient's allergies indicates:  No Known Allergies  Past Medical History:   Diagnosis Date    Anemia     Aortic valve stenosis     s/p mechanical AVR    Atrial fibrillation     Atrial flutter     Cardiomyopathy     CHF (congestive heart failure)     Drug-induced erectile dysfunction     ESRD due to hypertension     HD M, W, F    ESRD on dialysis     GERD (gastroesophageal reflux disease)     Hepatitis B     Hyperlipidemia     Hypertension     Nightmares     Obesity     DANIEL (obstructive sleep apnea) 11/12/2019    Secondary hyperparathyroidism of renal origin     Supraventricular tachycardia     atrial tachycardia    Tachycardia     Valvular regurgitation     AI, TR     Past Surgical History:   Procedure Laterality Date    ASD  REPAIR      age 8 [facility]    AV Graft Creation Left 03/2017    CARDIAC CATHETERIZATION      [facility]     CARDIAC VALVE SURGERY  02/08/2017    22 mm Medtronic AV, 28 mm TV Medtronic annuloplaty ring    COLONOSCOPY N/A 8/27/2019    Procedure: COLONOSCOPY;  Surgeon: Addie John MD;  Location: [facility] [place] ENDO;  Service: Endoscopy;  Laterality: N/A;  dialysis pt *needs K*    COLONOSCOPY N/A 9/3/2020    Procedure: COLONOSCOPY-need INR;  Surgeon: Jemma Youngblood MD;  Location: [facility] [place] ENDO;  Service: Endoscopy;  Laterality: N/A;    ESOPHAGOGASTRODUODENOSCOPY N/A 8/27/2019    Procedure: EGD (ESOPHAGOGASTRODUODENOSCOPY);  Surgeon: Addie John MD;  Location: [facility];  Service: Endoscopy;  Laterality: N/A;    INSERTION OF INFLATABLE PENILE PROSTHESIS N/A 5/21/2024    Procedure: INSERTION, PENILE PROSTHESIS, INFLATABLE;  Surgeon: Bonifacio Shirley MD;  Location: [facility] [place] OR;  Service: Urology;  Laterality: N/A;    RADIOFREQUENCY ABLATION  03/13/2017    Atrial tachycardia    REMOVAL OF GRAFT Left 7/2/2020    Procedure: REMOVAL, GRAFT;  Surgeon: Sascha Sidhu MD;  Location: [facility] [place] OR;  Service: Peripheral Vascular;  Laterality: Left;    RIGHT HEART CATHETERIZATION Right 8/12/2021    Procedure: INSERTION, CATHETER, RIGHT HEART;  Surgeon: Mariah Pierce MD;  Location: [facility] CATH LAB;  Service: Cardiology;  Laterality: Right;     Family History       Problem Relation (Age of Onset)    Anesthesia problems Paternal Uncle    Cancer Mother    Diabetes Paternal Aunt, Paternal Aunt    Heart attack Father    Heart failure Paternal Grandmother    Hypertension Paternal Aunt    Leukemia Mother, Paternal Aunt    No Known Problems Sister, Brother, Sister, Sister    Stroke Paternal Aunt    Suicide Paternal Uncle    Valvular heart disease Maternal Aunt          Social History[1]    Current medications Reviewed  Medications Ordered Prior to Encounter[2]    Review of Systems   Constitutional:  Positive for fatigue. Negative for activity  change, appetite change and fever.   HENT:  Negative for nosebleeds.    Respiratory:  Positive for shortness of breath. Negative for cough.    Cardiovascular:  Positive for palpitations. Negative for chest pain and leg swelling.   Gastrointestinal:  Negative for abdominal distention, abdominal pain and nausea.   Genitourinary:  Negative for frequency.   Musculoskeletal:  Negative for arthralgias and myalgias.   Skin:  Negative for rash.   Neurological:  Negative for dizziness and numbness.   Hematological:  Does not bruise/bleed easily.     Objective:   Physical Exam  Constitutional:       Appearance: Normal appearance.   HENT:      Head: Normocephalic and atraumatic.   Eyes:      Extraocular Movements: Extraocular movements intact.   Cardiovascular:      Rate and Rhythm: Normal rate.   Pulmonary:      Effort: Pulmonary effort is normal.   Abdominal:      General: Abdomen is flat.      Palpations: Abdomen is soft.   Musculoskeletal:         General: Normal range of motion.      Cervical back: Normal range of motion.   Skin:     General: Skin is warm and dry.      Capillary Refill: Capillary refill takes less than 2 seconds.      Coloration: Skin is not pale.   Neurological:      General: No focal deficit present.      Mental Status: He is alert.         Diagnostic Results: reviewed   TTE 4/1/25    Left Ventricle: The left ventricle is normal in size. There is mild concentric hypertrophy. Septal flattening in diastole and systole consistent with right ventricular volume and pressure overload. There is normal systolic function with a visually estimated ejection fraction of 55 - 60%.    Right Ventricle: Systolic function is mildly reduced.    Left Atrium: Mildly dilated    Aortic Valve: There is a mechanical valve in the aortic position.    Mitral Valve: There is severe bileaflet sclerosis. There is severe stenosis. The mean pressure gradient across the mitral valve is 21 mmHg at a heart rate of  bpm. There is mild  regurgitation.    Tricuspid Valve: There is moderate regurgitation.    Pulmonary Artery: There is severe pulmonary hypertension. The estimated pulmonary artery systolic pressure is 85 mmHg.    IVC/SVC: Intermediate venous pressure at 8 mmHg.    ABD US 2/18/25  Impression:     1. Cirrhotic appearing liver.  No focal liver lesions.    TTE 5/2/2024    Left Ventricle: The left ventricle is normal in size. Normal wall thickness. There is concentric remodeling. Normal wall motion. Ejection fraction by visual approximation is 60%. Grade II diastolic dysfunction.    Right Ventricle: Normal right ventricular cavity size. Wall thickness is normal. Systolic function is normal.    Left Atrium: Left atrium is severely dilated.    Aortic Valve: There is a tilting disc prosthetic valve in the aortic position.    Mitral Valve: Moderately calcified leaflets. Moderately restricted motion. There is mild to moderate stenosis. MVA 2.6 cm2 by planimetry, 1.6 cm2 by PHT.  The mean pressure gradient across the mitral valve is 11 mmHg at a heart rate of  bpm. There is mild regurgitation.    Tricuspid Valve: There is mild to moderate regurgitation.    Pulmonary Artery: There is pulmonary hypertension. The estimated pulmonary artery systolic pressure is 70 mmHg.    IVC/SVC: Normal venous pressure at 3 mmHg.  Assessment:   Mitral stenosis    s/p AVR with a 22 mm mechanical valve and TV annuloplasty with a 28 mm ring   PAF    Plan:     CTS Attending Note:    I have personally taken the history and examined this patient and agree with the VIJAY's note as stated above.   55-year-old male who underwent aortic replacement and tricuspid repair in 2017.  He initially did very well, but more recently has developed dyspnea on exertion.  A recent echo demonstrated what appeared to be severe mitral stenosis.  The aortic prosthesis appears to be working properly, and moderate tricuspid regurgitation was reported.  He has severe pulmonary hypertension.  He  has multiple comorbid conditions which would make reoperation high risk.  This would be his 3rd sternotomy.  He has end stage renal disease and is chronically on dialysis.  Lastly, he is known to have cirrhosis and although his labs look okay by imaging he has a nodular liver.  He is followed by hepatology in [place].   We will make arrangements for him to see Dr. Guallpa who had previously done an ablation for him to address his recurrent SVT.   We will obtain a repeat echo, and also ask Dr. Lenz to see him to see if balloon valvuloplasty is a possibility.       [1]   Social History  Socioeconomic History    Marital status: Single   Tobacco Use    Smoking status: Never     Passive exposure: Never    Smokeless tobacco: Never   Substance and Sexual Activity    Alcohol use: Yes     Alcohol/week: 14.0 standard drinks of alcohol     Types: 14 Glasses of wine per week     Comment: Drinks 1-2 glasses of red wine - quit in 2016; does have heavy drinking history; started drinking at age 13; was drinking a 12 pack over the weekend and two 24 ounces of beer a day during the week along with a pint of hard alcohol    Drug use: No    Sexual activity: Not Currently     Partners: Female   Social History Narrative    Caregiver Nurse Marjan Swain; 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     Social Drivers of Health     Financial Resource Strain: Low Risk  (12/19/2024)    Overall Financial Resource Strain (CARDIA)     Difficulty of Paying Living Expenses: Not very hard   Food Insecurity: No Food Insecurity (12/19/2024)    Hunger Vital Sign     Worried About Running Out of Food in the Last Year: Never true     Ran Out of Food in the Last Year: Never true   Transportation Needs: No Transportation Needs (12/19/2024)    PRAPARE - Transportation     Lack of Transportation (Medical): No     Lack of Transportation (Non-Medical): No   Physical Activity: Inactive (12/19/2024)    Exercise Vital Sign     Days of Exercise per Week: 0 days      Minutes of Exercise per Session: 0 min   Stress: No Stress Concern Present (12/19/2024)    [employer] [place] [employer] - Occupational Stress Questionnaire     Feeling of Stress : Only a little   Housing Stability: Unknown (12/19/2024)    Housing Stability Vital Sign     Unable to Pay for Housing in the Last Year: No     Homeless in the Last Year: No   [2]   Current Outpatient Medications on File Prior to Visit   Medication Sig Dispense Refill    ascorbic acid, vitamin C, (VITAMIN C) 500 MG tablet Take 1 tablet (500 mg total) by mouth 2 (two) times daily.      B complex-vitamin C-folic acid (ETHAN-RACQUEL) 0.8 mg Tab Take 1 tablet by mouth once daily (Patient not taking: Reported on 3/18/2025) 30 tablet 6    B complex-vitamin C-folic acid (RENAL-RACQUEL) 0.8 mg Tab Take 1 tablet by mouth once daily 30 tablet 6    calcium acetate,phosphat bind, (PHOSLO) 667 mg capsule Take 3 capsules by mouth 3 times daily with meals and 1 capsule by mouth twice daily with snacks 330 capsule 11    coenzyme Q10 (COQ-10) 100 mg capsule Take 1 capsule (100 mg total) by mouth once daily. 90 capsule 1    diltiaZEM (CARDIZEM CD) 180 MG 24 hr capsule Take 3 capsules (540 mg total) by mouth once daily. 270 capsule 3    ferrous sulfate 324 mg (65 mg iron) TbEC Take 325 mg by mouth once daily.      gabapentin (NEURONTIN) 100 MG capsule Take 1 capsule by mouth every 8 hours 90 capsule 2    gabapentin (NEURONTIN) 300 MG capsule Take 1 capsule orally twice daily as needed for pain. 60 capsule 0    hydrALAZINE (APRESOLINE) 100 MG tablet Take 1 tablet (100 mg total) by mouth every 8 (eight) hours. 270 tablet 1    HYDROcodone-acetaminophen (NORCO) 7.5-325 mg per tablet as needed; Take 1 tablet by mouth every 12 hours 60 tablet 0    hydrOXYzine HCL (ATARAX) 25 MG tablet Take one tablet by mouth daily as needed for itching 90 tablet 3    multivitamin with minerals tablet Take 1 tablet by mouth once daily.      olmesartan (BENICAR) 40 MG tablet  Take 1 tablet (40 mg total) by mouth every evening. 90 tablet 3    omega-3 fatty acids-vitamin E (FISH OIL) 1,000 mg Cap Take 1 capsule by mouth once daily.  0    pantoprazole (PROTONIX) 40 MG tablet Take 1 tablet by mouth once daily 90 tablet 3    pantoprazole (PROTONIX) 40 MG tablet Take 1 tablet by mouth once daily 30 tablet 3    rosuvastatin (CRESTOR) 10 MG tablet Take 1 tablet (10 mg total) by mouth once daily. 90 tablet 0    tenofovir disoproxil fumarate (VIREAD) 300 mg Tab TAKE 1 TABLET BY MOUTH ONCE WEEKLY 4 tablet 3    vitamin D (VITAMIN D3) 1000 units Tab Take 1 tablet by mouth daily 30 tablet 6    warfarin (COUMADIN) 5 MG tablet Take 1 tablet (5 mg) by mouth on Sunday, Tuesday and Thursday.  Using the 7.5 mg tablet, take 1 tablet (7.5 mg ) by mouth on Monday, Wednesday, Friday and Saturday or as directed by coumadin clinic. 12 tablet 11    warfarin (COUMADIN) 7.5 MG tablet Take one tablet (7.5 mg) by mouth on Monday, Wednesday, Friday and Saturday.  Using the 5 mg tablets, take 1 tablet by mouth (5 mg) on Sunday, Tuesday and Thursday OR as directed by coumadin clinic. 20 tablet 11     Current Facility-Administered Medications on File Prior to Visit   Medication Dose Route Frequency Provider Last Rate Last Admin    0.9%  NaCl infusion   Intravenous Continuous Christopher Jane MD        sodium chloride 0.9% flush 10 mL  10 mL Intravenous PRN Christopher Jane MD

## 2025-05-08 ENCOUNTER — LAB VISIT (OUTPATIENT)
Dept: LAB | Facility: HOSPITAL | Age: 55
End: 2025-05-08
Attending: INTERNAL MEDICINE
Payer: MEDICARE

## 2025-05-08 ENCOUNTER — RESULTS FOLLOW-UP (OUTPATIENT)
Dept: HEMATOLOGY/ONCOLOGY | Facility: CLINIC | Age: 55
End: 2025-05-08

## 2025-05-08 DIAGNOSIS — D64.9 ANEMIA, UNSPECIFIED TYPE: ICD-10-CM

## 2025-05-08 LAB
ABSOLUTE EOSINOPHIL (OHS): 0.17 K/UL
ABSOLUTE MONOCYTE (OHS): 0.68 K/UL (ref 0.3–1)
ABSOLUTE NEUTROPHIL COUNT (OHS): 3.55 K/UL (ref 1.8–7.7)
ALBUMIN SERPL BCP-MCNC: 3.5 G/DL (ref 3.5–5.2)
ALP SERPL-CCNC: 102 UNIT/L (ref 40–150)
ALT SERPL W/O P-5'-P-CCNC: 24 UNIT/L (ref 10–44)
ANION GAP (OHS): 14 MMOL/L (ref 8–16)
AST SERPL-CCNC: 17 UNIT/L (ref 11–45)
BASOPHILS # BLD AUTO: 0.01 K/UL
BASOPHILS NFR BLD AUTO: 0.2 %
BILIRUB SERPL-MCNC: 0.5 MG/DL (ref 0.1–1)
BUN SERPL-MCNC: 50 MG/DL (ref 6–20)
CALCIUM SERPL-MCNC: 9.5 MG/DL (ref 8.7–10.5)
CHLORIDE SERPL-SCNC: 102 MMOL/L (ref 95–110)
CO2 SERPL-SCNC: 27 MMOL/L (ref 23–29)
CREAT SERPL-MCNC: 12.4 MG/DL (ref 0.5–1.4)
ERYTHROCYTE [DISTWIDTH] IN BLOOD BY AUTOMATED COUNT: 14.6 % (ref 11.5–14.5)
GFR SERPLBLD CREATININE-BSD FMLA CKD-EPI: 4 ML/MIN/1.73/M2
GLUCOSE SERPL-MCNC: 87 MG/DL (ref 70–110)
HAPTOGLOB SERPL-MCNC: 99 MG/DL (ref 30–250)
HCT VFR BLD AUTO: 39.8 % (ref 40–54)
HGB BLD-MCNC: 13.1 GM/DL (ref 14–18)
IMM GRANULOCYTES # BLD AUTO: 0.01 K/UL (ref 0–0.04)
IMM GRANULOCYTES NFR BLD AUTO: 0.2 % (ref 0–0.5)
IRON SATN MFR SERPL: 39 % (ref 20–50)
IRON SERPL-MCNC: 88 UG/DL (ref 45–160)
LDH SERPL-CCNC: 245 U/L (ref 110–260)
LYMPHOCYTES # BLD AUTO: 1.39 K/UL (ref 1–4.8)
MCH RBC QN AUTO: 36 PG (ref 27–31)
MCHC RBC AUTO-ENTMCNC: 32.9 G/DL (ref 32–36)
MCV RBC AUTO: 109 FL (ref 82–98)
NUCLEATED RBC (/100WBC) (OHS): 0 /100 WBC
PLATELET # BLD AUTO: 141 K/UL (ref 150–450)
PMV BLD AUTO: 9.2 FL (ref 9.2–12.9)
POTASSIUM SERPL-SCNC: 4.8 MMOL/L (ref 3.5–5.1)
PROT SERPL-MCNC: 8.8 GM/DL (ref 6–8.4)
RBC # BLD AUTO: 3.64 M/UL (ref 4.6–6.2)
RELATIVE EOSINOPHIL (OHS): 2.9 %
RELATIVE LYMPHOCYTE (OHS): 23.9 % (ref 18–48)
RELATIVE MONOCYTE (OHS): 11.7 % (ref 4–15)
RELATIVE NEUTROPHIL (OHS): 61.1 % (ref 38–73)
SODIUM SERPL-SCNC: 143 MMOL/L (ref 136–145)
TIBC SERPL-MCNC: 226 UG/DL (ref 250–450)
TRANSFERRIN SERPL-MCNC: 153 MG/DL (ref 200–375)
TSH SERPL-ACNC: 1.59 UIU/ML (ref 0.4–4)
WBC # BLD AUTO: 5.81 K/UL (ref 3.9–12.7)

## 2025-05-08 PROCEDURE — 83615 LACTATE (LD) (LDH) ENZYME: CPT

## 2025-05-08 PROCEDURE — 84443 ASSAY THYROID STIM HORMONE: CPT

## 2025-05-08 PROCEDURE — 83010 ASSAY OF HAPTOGLOBIN QUANT: CPT

## 2025-05-08 PROCEDURE — 82040 ASSAY OF SERUM ALBUMIN: CPT

## 2025-05-08 PROCEDURE — 86334 IMMUNOFIX E-PHORESIS SERUM: CPT

## 2025-05-08 PROCEDURE — 84466 ASSAY OF TRANSFERRIN: CPT

## 2025-05-08 PROCEDURE — 85025 COMPLETE CBC W/AUTO DIFF WBC: CPT

## 2025-05-08 PROCEDURE — 84165 PROTEIN E-PHORESIS SERUM: CPT

## 2025-05-08 PROCEDURE — 83521 IG LIGHT CHAINS FREE EACH: CPT

## 2025-05-08 PROCEDURE — 36415 COLL VENOUS BLD VENIPUNCTURE: CPT

## 2025-05-09 LAB
ALBUMIN, SPE (OHS): 4.35 G/DL (ref 3.35–5.55)
ALPHA 1 GLOB (OHS): 0.34 GM/DL (ref 0.17–0.41)
ALPHA 2 GLOB (OHS): 0.71 GM/DL (ref 0.43–0.99)
BETA GLOB (OHS): 0.68 GM/DL (ref 0.5–1.1)
GAMMA GLOBULIN (OHS): 1.62 GM/DL (ref 0.67–1.58)
KAPPA LC FREE SER-MCNC: 0.8 MG/L (ref 0.26–1.65)
KAPPA LC FREE/LAMBDA FREE SER: 28.51 MG/DL (ref 0.33–1.94)
LAMBDA LC FREE SERPL-MCNC: 35.78 MG/DL (ref 0.57–2.63)
PROT SERPL-MCNC: 7.7 GM/DL (ref 6–8.4)

## 2025-05-13 ENCOUNTER — ANTI-COAG VISIT (OUTPATIENT)
Dept: CARDIOLOGY | Facility: CLINIC | Age: 55
End: 2025-05-13
Payer: MEDICARE

## 2025-05-13 DIAGNOSIS — I48.0 PAROXYSMAL ATRIAL FIBRILLATION: ICD-10-CM

## 2025-05-13 DIAGNOSIS — Z95.2 HISTORY OF MECHANICAL AORTIC VALVE REPLACEMENT: ICD-10-CM

## 2025-05-13 DIAGNOSIS — Z79.01 ON COUMADIN FOR ATRIAL FIBRILLATION: ICD-10-CM

## 2025-05-13 DIAGNOSIS — I48.91 ON COUMADIN FOR ATRIAL FIBRILLATION: ICD-10-CM

## 2025-05-13 DIAGNOSIS — Z79.01 LONG TERM (CURRENT) USE OF ANTICOAGULANTS: Primary | ICD-10-CM

## 2025-05-13 LAB
CTP QC/QA: YES
INR PPP: 1.8 (ref 2–3)
PATHOLOGIST INTERPRETATION - IFE SERUM (OHS): NORMAL
PATHOLOGIST REVIEW - SPE (OHS): NORMAL

## 2025-05-13 PROCEDURE — 85610 PROTHROMBIN TIME: CPT | Mod: PBBFAC

## 2025-05-13 PROCEDURE — 99999PBSHW POCT INR: Mod: PBBFAC,,,

## 2025-05-13 RX ORDER — ROSUVASTATIN CALCIUM 10 MG/1
10 TABLET, COATED ORAL DAILY
Qty: 90 TABLET | Refills: 0 | Status: SHIPPED | OUTPATIENT
Start: 2025-05-13 | End: 2025-08-11

## 2025-05-13 NOTE — TELEPHONE ENCOUNTER
Care Due:                  Date            Visit Type   Department     Provider  --------------------------------------------------------------------------------                                [facility] INTERNAL  Last Visit: 07-      FOLLOW UP    MEDICINE       Boston Nevarez  Next Visit: None Scheduled  None         None Found                                                            Last  Test          Frequency    Reason                     Performed    Due Date  --------------------------------------------------------------------------------    Lipid Panel.  12 months..  rosuvastatin.............  03- 03-    [facility] Embedded Care Due Messages. Reference number: [id number].   5/13/2025 9:37:19 AM CDT

## 2025-05-13 NOTE — PROGRESS NOTES
Patient's INR is sub-therapeutic at 1.8. Patient confirms he followed previous instructions. Patient reports he ate nuts. Re-educated patient on Coumadin Diet. Advised patient of increased risk of clotting;sign/symptoms of clotting and need to go to ED if he experiences any. Patient report he is not having any signs/symptoms of clotting. Dosing/instructions given: Will give boosted dose of warfarin 7.5 mg today 5/13/25; then resume warfarin 5 mg on Thursdays, Sundays and Tuesdays; and 7.5 mg all other days. Recheck on 5/27/25. Calendar reviewed with patient. Patient verbalizes understanding.

## 2025-05-15 ENCOUNTER — OFFICE VISIT (OUTPATIENT)
Dept: HEMATOLOGY/ONCOLOGY | Facility: CLINIC | Age: 55
End: 2025-05-15
Payer: MEDICARE

## 2025-05-15 VITALS
DIASTOLIC BLOOD PRESSURE: 57 MMHG | TEMPERATURE: 98 F | SYSTOLIC BLOOD PRESSURE: 99 MMHG | WEIGHT: 209.56 LBS | HEART RATE: 92 BPM | BODY MASS INDEX: 26.89 KG/M2 | HEIGHT: 74 IN | OXYGEN SATURATION: 98 %

## 2025-05-15 DIAGNOSIS — Z99.2 ESRD (END STAGE RENAL DISEASE) ON DIALYSIS: Chronic | ICD-10-CM

## 2025-05-15 DIAGNOSIS — D63.1 ANEMIA ASSOCIATED WITH CHRONIC RENAL FAILURE: Primary | ICD-10-CM

## 2025-05-15 DIAGNOSIS — D69.6 THROMBOCYTOPENIA: ICD-10-CM

## 2025-05-15 DIAGNOSIS — N18.9 ANEMIA ASSOCIATED WITH CHRONIC RENAL FAILURE: Primary | ICD-10-CM

## 2025-05-15 DIAGNOSIS — N18.6 ESRD (END STAGE RENAL DISEASE) ON DIALYSIS: Chronic | ICD-10-CM

## 2025-05-15 DIAGNOSIS — Z79.01 ON COUMADIN FOR ATRIAL FIBRILLATION: ICD-10-CM

## 2025-05-15 DIAGNOSIS — I48.91 ON COUMADIN FOR ATRIAL FIBRILLATION: ICD-10-CM

## 2025-05-15 PROCEDURE — 99999 PR PBB SHADOW E&M-EST. PATIENT-LVL V: CPT | Mod: PBBFAC,,, | Performed by: INTERNAL MEDICINE

## 2025-05-15 PROCEDURE — 99215 OFFICE O/P EST HI 40 MIN: CPT | Mod: PBBFAC | Performed by: INTERNAL MEDICINE

## 2025-05-15 NOTE — PROGRESS NOTES
Subjective:       Patient ID: Isidro White Jr. is a 55 y.o. male.    Chief Complaint: Chronic Renal Failure and Anemia    HPI:  55-year-old male history of anemia secondary to chronic renal failure patient returns for clinical follow-up.  End-stage renal disease currently receiving dialysis Monday Wednesday and Fridays    Past Medical History:   Diagnosis Date    Anemia     Aortic valve stenosis     s/p mechanical AVR    Atrial fibrillation     Atrial flutter     Cardiomyopathy     CHF (congestive heart failure)     Drug-induced erectile dysfunction     ESRD due to hypertension     HD M, W, F    ESRD on dialysis     GERD (gastroesophageal reflux disease)     Hepatitis B     Hyperlipidemia     Hypertension     Nightmares     Obesity     DANIEL (obstructive sleep apnea) 11/12/2019    Secondary hyperparathyroidism of renal origin     Supraventricular tachycardia     atrial tachycardia    Tachycardia     Valvular regurgitation     AI, TR     Family History   Problem Relation Name Age of Onset    Leukemia Mother      Cancer Mother      Heart attack Father          massive MI at age 67    No Known Problems Sister      No Known Problems Brother      No Known Problems Sister      No Known Problems Sister      Heart failure Paternal Grandmother      Diabetes Paternal Aunt      Hypertension Paternal Aunt      Leukemia Paternal Aunt          CLL    Suicide Paternal Uncle      Anesthesia problems Paternal Uncle      Diabetes Paternal Aunt      Stroke Paternal Aunt      Valvular heart disease Maternal Aunt      Kidney disease Neg Hx      Colon cancer Neg Hx       Social History[1]  Past Surgical History:   Procedure Laterality Date    ASD REPAIR      age 8 [facility]    AV Graft Creation Left 03/2017    CARDIAC CATHETERIZATION      [facility]     CARDIAC VALVE SURGERY  02/08/2017    22 mm Medtronic AV, 28 mm TV Medtronic annuloplaty ring    COLONOSCOPY N/A 8/27/2019    Procedure: COLONOSCOPY;  Surgeon: Addie John,  MD;  Location: [facility] ENDO;  Service: Endoscopy;  Laterality: N/A;  dialysis pt *needs K*    COLONOSCOPY N/A 9/3/2020    Procedure: COLONOSCOPY-need INR;  Surgeon: Jemma Youngblood MD;  Location: [facility] ENDO;  Service: Endoscopy;  Laterality: N/A;    ESOPHAGOGASTRODUODENOSCOPY N/A 8/27/2019    Procedure: EGD (ESOPHAGOGASTRODUODENOSCOPY);  Surgeon: Addie John MD;  Location: [facility] ENDO;  Service: Endoscopy;  Laterality: N/A;    INSERTION OF INFLATABLE PENILE PROSTHESIS N/A 5/21/2024    Procedure: INSERTION, PENILE PROSTHESIS, INFLATABLE;  Surgeon: Bonifacio Shirley MD;  Location: [facility] OR;  Service: Urology;  Laterality: N/A;    RADIOFREQUENCY ABLATION  03/13/2017    Atrial tachycardia    REMOVAL OF GRAFT Left 7/2/2020    Procedure: REMOVAL, GRAFT;  Surgeon: Sascha Sidhu MD;  Location: [facility] OR;  Service: Peripheral Vascular;  Laterality: Left;    RIGHT HEART CATHETERIZATION Right 8/12/2021    Procedure: INSERTION, CATHETER, RIGHT HEART;  Surgeon: Mariah Pierce MD;  Location: [facility] CATH LAB;  Service: Cardiology;  Laterality: Right;       Labs:  Lab Results   Component Value Date    WBC 5.81 05/08/2025    HGB 13.1 (L) 05/08/2025    HCT 39.8 (L) 05/08/2025     (H) 05/08/2025     (L) 05/08/2025     BMP  Lab Results   Component Value Date     05/08/2025    K 4.8 05/08/2025     05/08/2025    CO2 27 05/08/2025    BUN 50 (H) 05/08/2025    CREATININE 12.4 (H) 05/08/2025    CALCIUM 9.5 05/08/2025    ANIONGAP 14 05/08/2025    ESTGFRAFRICA 4.9 (A) 07/21/2022    EGFRNONAA 4.3 (A) 07/21/2022     Lab Results   Component Value Date    ALT 24 05/08/2025    AST 17 05/08/2025    ALKPHOS 102 05/08/2025    BILITOT 0.5 05/08/2025       Lab Results   Component Value Date    IRON 88 05/08/2025    TIBC 226 (L) 05/08/2025    FERRITIN 1,904 (H) 07/02/2024     Lab Results   Component Value Date    NXCXFFDI88 1164 (H) 09/24/2024     Lab Results   Component Value Date    FOLATE 8.0 05/24/2024     Lab Results    Component Value Date    TSH 1.587 05/08/2025         Review of Systems   Constitutional:  Negative for activity change, appetite change, chills, diaphoresis, fatigue, fever and unexpected weight change.   HENT:  Negative for congestion, dental problem, drooling, ear discharge, ear pain, facial swelling, hearing loss, mouth sores, nosebleeds, postnasal drip, rhinorrhea, sinus pressure, sneezing, sore throat, tinnitus, trouble swallowing and voice change.    Eyes:  Negative for photophobia, pain, discharge, redness, itching and visual disturbance.   Respiratory:  Negative for apnea, cough, choking, chest tightness, shortness of breath, wheezing and stridor.    Cardiovascular:  Negative for chest pain, palpitations and leg swelling.   Gastrointestinal:  Negative for abdominal distention, abdominal pain, anal bleeding, blood in stool, constipation, diarrhea, nausea, rectal pain and vomiting.   Endocrine: Negative for cold intolerance, heat intolerance, polydipsia, polyphagia and polyuria.   Genitourinary:  Negative for decreased urine volume, difficulty urinating, dysuria, enuresis, flank pain, frequency, genital sores, hematuria, penile discharge, penile pain, penile swelling, scrotal swelling, testicular pain and urgency.   Musculoskeletal:  Negative for arthralgias, back pain, gait problem, joint swelling, myalgias, neck pain and neck stiffness.   Skin:  Negative for color change, pallor, rash and wound.   Allergic/Immunologic: Negative for environmental allergies, food allergies and immunocompromised state.   Neurological:  Negative for dizziness, tremors, seizures, syncope, facial asymmetry, speech difficulty, weakness, light-headedness, numbness and headaches.   Hematological:  Negative for adenopathy. Does not bruise/bleed easily.   Psychiatric/Behavioral:  Positive for sleep disturbance. Negative for agitation, behavioral problems, confusion, decreased concentration, dysphoric mood, hallucinations, self-injury  and suicidal ideas. The patient is not nervous/anxious and is not hyperactive.        Objective:      Physical Exam  Vitals reviewed.   Constitutional:       General: He is not in acute distress.     Appearance: He is well-developed. He is not diaphoretic.   HENT:      Head: Normocephalic.      Right Ear: External ear normal.      Left Ear: External ear normal.      Nose: Nose normal.      Right Sinus: No maxillary sinus tenderness or frontal sinus tenderness.      Left Sinus: No maxillary sinus tenderness or frontal sinus tenderness.      Mouth/Throat:      Pharynx: No oropharyngeal exudate.   Eyes:      General: Lids are normal. No scleral icterus.        Right eye: No discharge.         Left eye: No discharge.      Extraocular Movements:      Right eye: Normal extraocular motion.      Left eye: Normal extraocular motion.      Conjunctiva/sclera:      Right eye: Right conjunctiva is not injected. No hemorrhage.     Left eye: Left conjunctiva is not injected. No hemorrhage.     Pupils: Pupils are equal, round, and reactive to light.   Neck:      Thyroid: No thyromegaly.      Vascular: No JVD.      Trachea: No tracheal deviation.   Cardiovascular:      Rate and Rhythm: Normal rate.   Pulmonary:      Effort: Pulmonary effort is normal. No respiratory distress.      Breath sounds: No stridor.   Abdominal:      General: Bowel sounds are normal.      Palpations: Abdomen is soft. There is no hepatomegaly, splenomegaly or mass.      Tenderness: There is no abdominal tenderness.   Musculoskeletal:         General: No tenderness. Normal range of motion.      Cervical back: Normal range of motion and neck supple.   Lymphadenopathy:      Head:      Right side of head: No posterior auricular or occipital adenopathy.      Left side of head: No posterior auricular or occipital adenopathy.      Cervical: No cervical adenopathy.      Right cervical: No superficial, deep or posterior cervical adenopathy.     Left cervical: No  superficial, deep or posterior cervical adenopathy.      Upper Body:      Right upper body: No supraclavicular adenopathy.      Left upper body: No supraclavicular adenopathy.   Skin:     General: Skin is dry.      Findings: No erythema or rash.      Nails: There is no clubbing.   Neurological:      Mental Status: He is alert and oriented to person, place, and time.      Cranial Nerves: No cranial nerve deficit.      Coordination: Coordination normal.   Psychiatric:         Behavior: Behavior normal.         Thought Content: Thought content normal.         Judgment: Judgment normal.             Assessment:      1. Anemia associated with chronic renal failure    2. Thrombocytopenia    3. On Coumadin for atrial fibrillation    4. ESRD (end stage renal disease) on dialysis Q M,W,F           Med Onc Chart Routing      Follow up with physician . Return in three-week CBC CMP serum iron TIBC ferritin prior   Follow up with VIJAY    Infusion scheduling note    Injection scheduling note    Labs    Imaging    Pharmacy appointment    Other referrals                 Plan:     Reviewed laboratory at this point hemoglobin above Medicare guidelines recommend follow-up three-week CBC CMP serum iron TIBC ferritin possible consideration of erythropoietin therapy        Andres Yanez Jr, MD FACP         [1]   Social History  Socioeconomic History    Marital status: Single   Tobacco Use    Smoking status: Never     Passive exposure: Never    Smokeless tobacco: Never   Substance and Sexual Activity    Alcohol use: Yes     Alcohol/week: 14.0 standard drinks of alcohol     Types: 14 Glasses of wine per week     Comment: Drinks 1-2 glasses of red wine - quit in 2016; does have heavy drinking history; started drinking at age 13; was drinking a 12 pack over the weekend and two 24 ounces of beer a day during the week along with a pint of hard alcohol    Drug use: No    Sexual activity: Not Currently     Partners: Female   Social History  Narrative    Caregiver Nurse Marjan Swain; 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     Social Drivers of Health     Financial Resource Strain: Low Risk  (12/19/2024)    Overall Financial Resource Strain (CARDIA)     Difficulty of Paying Living Expenses: Not very hard   Food Insecurity: No Food Insecurity (12/19/2024)    Hunger Vital Sign     Worried About Running Out of Food in the Last Year: Never true     Ran Out of Food in the Last Year: Never true   Transportation Needs: No Transportation Needs (12/19/2024)    PRAPARE - Transportation     Lack of Transportation (Medical): No     Lack of Transportation (Non-Medical): No   Physical Activity: Inactive (12/19/2024)    Exercise Vital Sign     Days of Exercise per Week: 0 days     Minutes of Exercise per Session: 0 min   Stress: No Stress Concern Present (12/19/2024)    [employer] - Occupational Stress Questionnaire     Feeling of Stress : Only a little   Housing Stability: Unknown (12/19/2024)    Housing Stability Vital Sign     Unable to Pay for Housing in the Last Year: No     Homeless in the Last Year: No

## 2025-05-16 ENCOUNTER — TELEPHONE (OUTPATIENT)
Dept: HEMATOLOGY/ONCOLOGY | Facility: CLINIC | Age: 55
End: 2025-05-16
Payer: MEDICARE

## 2025-05-16 NOTE — TELEPHONE ENCOUNTER
5/15/2025     2:07 PM 10/7/2024     7:44 AM 9/24/2024     2:29 PM   DISTRESS SCREENING   Distress Score 8 0 - No Distress 0 - No Distress   Practical Concerns  None of these  None of these    Social Concerns  None of these None of these   Emotional Concerns  None of these None of these   Spiritual or Mandaeism Concerns  None of these None of these   Physical Concerns Sleep;Loss or change of physical abilities None of these None of these       Data saved with a previous flowsheet row definition     SS was consulted to assess pt's needs. SW spoke with pt who reported no needs at this time. SW encouraged pt to contact SS as needed. SW will remain available.

## 2025-05-20 ENCOUNTER — HOSPITAL ENCOUNTER (OUTPATIENT)
Dept: CARDIOLOGY | Facility: HOSPITAL | Age: 55
Discharge: HOME | End: 2025-05-20
Attending: THORACIC SURGERY (CARDIOTHORACIC VASCULAR SURGERY)
Payer: MEDICARE

## 2025-05-20 ENCOUNTER — OFFICE VISIT (OUTPATIENT)
Dept: CARDIOLOGY | Facility: CLINIC | Age: 55
End: 2025-05-20
Payer: MEDICARE

## 2025-05-20 VITALS
BODY MASS INDEX: 27.34 KG/M2 | WEIGHT: 212.94 LBS | OXYGEN SATURATION: 97 % | SYSTOLIC BLOOD PRESSURE: 143 MMHG | HEART RATE: 81 BPM | DIASTOLIC BLOOD PRESSURE: 68 MMHG

## 2025-05-20 VITALS
BODY MASS INDEX: 26.95 KG/M2 | SYSTOLIC BLOOD PRESSURE: 140 MMHG | HEIGHT: 74 IN | HEART RATE: 78 BPM | DIASTOLIC BLOOD PRESSURE: 72 MMHG | WEIGHT: 210 LBS

## 2025-05-20 DIAGNOSIS — I27.20 PULMONARY HTN: Primary | ICD-10-CM

## 2025-05-20 DIAGNOSIS — I34.2 NONRHEUMATIC MITRAL VALVE STENOSIS: ICD-10-CM

## 2025-05-20 DIAGNOSIS — N18.6 ESRD (END STAGE RENAL DISEASE) ON DIALYSIS: Chronic | ICD-10-CM

## 2025-05-20 DIAGNOSIS — I10 ESSENTIAL HYPERTENSION: ICD-10-CM

## 2025-05-20 DIAGNOSIS — I05.0 SEVERE MITRAL VALVE STENOSIS: ICD-10-CM

## 2025-05-20 DIAGNOSIS — Z95.2 HISTORY OF MECHANICAL AORTIC VALVE REPLACEMENT: ICD-10-CM

## 2025-05-20 DIAGNOSIS — Z99.2 ESRD (END STAGE RENAL DISEASE) ON DIALYSIS: Chronic | ICD-10-CM

## 2025-05-20 DIAGNOSIS — I48.0 PAROXYSMAL ATRIAL FIBRILLATION: Primary | ICD-10-CM

## 2025-05-20 LAB
AORTIC SIZE INDEX (SOV): 1.6 CM/M2
AORTIC SIZE INDEX: 1.6 CM/M2
ASCENDING AORTA: 3.5 CM
AV AREA BY CONTINUOUS VTI: 1.4 CM2
AV INDEX (PROSTH): 0.45
AV LVOT MEAN GRADIENT: 4 MMHG
AV LVOT PEAK GRADIENT: 7 MMHG
AV MEAN GRADIENT: 18 MMHG
AV PEAK GRADIENT: 36 MMHG
AV VALVE AREA BY VELOCITY RATIO: 1.4 CM²
AV VALVE AREA: 1.4 CM2
AV VELOCITY RATIO: 0.43
BSA FOR ECHO PROCEDURE: 2.23 M2
CV ECHO LV RWT: 0.42 CM
DOP CALC AO PEAK VEL: 3 M/S
DOP CALC AO VTI: 48.5 CM
DOP CALC LVOT AREA: 3.1 CM2
DOP CALC LVOT DIAMETER: 2 CM
DOP CALC LVOT PEAK VEL: 1.3 M/S
DOP CALC LVOT STROKE VOLUME: 69.1 CM3
DOP CALCLVOT PEAK VEL VTI: 22 CM
E WAVE DECELERATION TIME: 319 MS
E/A RATIO: 1.12
ECHO EF ESTIMATED: 58 %
ECHO LV POSTERIOR WALL: 1 CM (ref 0.6–1.1)
FRACTIONAL SHORTENING: 31.3 % (ref 28–44)
INTERVENTRICULAR SEPTUM: 1 CM (ref 0.6–1.1)
IVRT: 57 MS
LA MAJOR: 6.8 CM
LA MINOR: 7 CM
LA WIDTH: 4.5 CM
LEFT ATRIUM SIZE: 5.1 CM
LEFT ATRIUM VOLUME INDEX MOD: 55 ML/M2
LEFT ATRIUM VOLUME INDEX: 61 ML/M2
LEFT ATRIUM VOLUME MOD: 123 ML
LEFT ATRIUM VOLUME: 135 CM3
LEFT INTERNAL DIMENSION IN SYSTOLE: 3.3 CM (ref 2.1–4)
LEFT VENTRICLE DIASTOLIC VOLUME INDEX: 47.3 ML/M2
LEFT VENTRICLE DIASTOLIC VOLUME: 105 ML
LEFT VENTRICLE MASS INDEX: 76.7 G/M2
LEFT VENTRICLE SYSTOLIC VOLUME INDEX: 19.8 ML/M2
LEFT VENTRICLE SYSTOLIC VOLUME: 44 ML
LEFT VENTRICULAR INTERNAL DIMENSION IN DIASTOLE: 4.8 CM (ref 3.5–6)
LEFT VENTRICULAR MASS: 170.2 G
MV MEAN GRADIENT: 14 MMHG
MV PEAK A VEL: 2.03 M/S
MV PEAK E VEL: 2.28 M/S
MV PEAK GRADIENT: 29 MMHG
MV VALVE AREA BY PLANIMETRY: 2.5 CM2
OHS CV RV/LV RATIO: 1.06 CM
PISA TR MAX VEL: 3.6 M/S
RA MAJOR: 6.09 CM
RA PRESSURE ESTIMATED: 8 MMHG
RA WIDTH: 4.41 CM
RIGHT VENTRICLE DIASTOLIC BASEL DIMENSION: 5.1 CM
RV TB RVSP: 12 MMHG
RV TISSUE DOPPLER FREE WALL SYSTOLIC VELOCITY 1 (APICAL 4 CHAMBER VIEW): 6.92 CM/S
SINUS: 3.57 CM
STJ: 2.9 CM
TV PEAK GRADIENT: 51 MMHG
TV REST PULMONARY ARTERY PRESSURE: 60 MMHG
Z-SCORE OF LEFT VENTRICULAR DIMENSION IN END DIASTOLE: -4.8
Z-SCORE OF LEFT VENTRICULAR DIMENSION IN END SYSTOLE: -2.8

## 2025-05-20 PROCEDURE — 93306 TTE W/DOPPLER COMPLETE: CPT

## 2025-05-20 PROCEDURE — 93306 TTE W/DOPPLER COMPLETE: CPT | Mod: 26,,, | Performed by: STUDENT IN AN ORGANIZED HEALTH CARE EDUCATION/TRAINING PROGRAM

## 2025-05-20 PROCEDURE — 99999 PR PBB SHADOW E&M-EST. PATIENT-LVL IV: CPT | Mod: PBBFAC,,, | Performed by: INTERNAL MEDICINE

## 2025-05-20 PROCEDURE — 99214 OFFICE O/P EST MOD 30 MIN: CPT | Mod: PBBFAC,25 | Performed by: INTERNAL MEDICINE

## 2025-05-20 NOTE — PROGRESS NOTES
Interventional Cardiology Clinic Note      Subjective:     History of Present Illness    CHIEF COMPLAINT:  - Presents for follow-up regarding significant mitral stenosis and to discuss treatment options, including balloon valvuloplasty vs. surgical valve replacement.    HPI:  - 55-year-old male with history of congenital heart murmur and multiple cardiac surgeries  - Underwent operation at [facility] by Dr. Blas Miller  - Dr. Rocha performed surgery in 2016 or 2017 to insert 22 mm Medtronic mechanical aortic valve and repair tricuspid valve  - Issues noted with mitral valve closing and blood flow restriction due to narrowing caused by calcium deposits  - On dialysis for 8 years, likely due to HTN  - Currently symptomatic from significant mitral stenosis, incompatible with long-term survival  - Expresses hesitation about undergoing another open-heart surgery  - History of AFib, scheduled to see Dr. Chavarria for ablation  - Has had a previous ablation performed by Dr. Chavarria    CARDIAC HISTORY:  - Born with heart murmur  - Childhood heart surgery by Dr. Blas Miller at [facility]  - 2016: Dr. Rocha placed 22 mm Medtronic mechanical aortic valve, fixed tricuspid valve with ring  - Echo (Dr. Rahman): significant mitral stenosis with heavy calcium, Camilo score greater than 12.  - AFib, planned for ablation  - Previous ablation by Dr. Chavarria    MEDICATIONS:  - Warfarin    MEDICAL HISTORY:  - Kidney failure: On dialysis for 8 years  - HTN: Implied as cause of kidney failure    FAMILY HISTORY:  - Father: CAD      ROS:  General: -fever, -chills, -fatigue, -weight gain, -weight loss  Eyes: -vision changes, -redness, -discharge  ENT: -ear pain, -nasal congestion, -sore throat  Cardiovascular: -chest pain, -palpitations, -lower extremity edema  Respiratory: -cough, -shortness of breath  Gastrointestinal: -abdominal pain, -nausea, -vomiting, -diarrhea, -constipation, -blood in stool  Genitourinary:  -dysuria, -hematuria, -frequency  Musculoskeletal: -joint pain, -muscle pain  Skin: -rash, -lesion  Neurological: -headache, -dizziness, -numbness, -tingling  Psychiatric: -anxiety, -depression, -sleep difficulty         PMH:     Past Medical History:   Diagnosis Date    Anemia     Aortic valve stenosis     s/p mechanical AVR    Atrial fibrillation     Atrial flutter     Cardiomyopathy     CHF (congestive heart failure)     Drug-induced erectile dysfunction     ESRD due to hypertension     HD M, W, F    ESRD on dialysis     GERD (gastroesophageal reflux disease)     Hepatitis B     Hyperlipidemia     Hypertension     Nightmares     Obesity     DANIEL (obstructive sleep apnea) 11/12/2019    Secondary hyperparathyroidism of renal origin     Supraventricular tachycardia     atrial tachycardia    Tachycardia     Valvular regurgitation     AI, TR     Past Surgical History:   Procedure Laterality Date    ASD REPAIR      age 8 [facility]    AV Graft Creation Left 03/2017    CARDIAC CATHETERIZATION      [facility]     CARDIAC VALVE SURGERY  02/08/2017    22 mm Medtronic AV, 28 mm TV Medtronic annuloplaty ring    COLONOSCOPY N/A 8/27/2019    Procedure: COLONOSCOPY;  Surgeon: Addie John MD;  Location: [facility] ENDO;  Service: Endoscopy;  Laterality: N/A;  dialysis pt *needs K*    COLONOSCOPY N/A 9/3/2020    Procedure: COLONOSCOPY-need INR;  Surgeon: Jemma Youngblood MD;  Location: [facility] ENDO;  Service: Endoscopy;  Laterality: N/A;    ESOPHAGOGASTRODUODENOSCOPY N/A 8/27/2019    Procedure: EGD (ESOPHAGOGASTRODUODENOSCOPY);  Surgeon: Addie John MD;  Location: [facility] ENDO;  Service: Endoscopy;  Laterality: N/A;    INSERTION OF INFLATABLE PENILE PROSTHESIS N/A 5/21/2024    Procedure: INSERTION, PENILE PROSTHESIS, INFLATABLE;  Surgeon: Bonifacio Shirley MD;  Location: [facility] OR;  Service: Urology;  Laterality: N/A;    RADIOFREQUENCY ABLATION  03/13/2017    Atrial tachycardia    REMOVAL OF GRAFT Left 7/2/2020    Procedure:  REMOVAL, GRAFT;  Surgeon: Sascha Sidhu MD;  Location: [facility] OR;  Service: Peripheral Vascular;  Laterality: Left;    RIGHT HEART CATHETERIZATION Right 8/12/2021    Procedure: INSERTION, CATHETER, RIGHT HEART;  Surgeon: Mariah Pierce MD;  Location: [facility];  Service: Cardiology;  Laterality: Right;     Allergies:   Review of patient's allergies indicates:  No Known Allergies  Medications:   Medications Ordered Prior to Encounter[1]  Social History:     Social History     Tobacco Use    Smoking status: Never     Passive exposure: Never    Smokeless tobacco: Never   Substance Use Topics    Alcohol use: Yes     Alcohol/week: 14.0 standard drinks of alcohol     Types: 14 Glasses of wine per week     Comment: Drinks 1-2 glasses of red wine - quit in 2016; does have heavy drinking history; started drinking at age 13; was drinking a 12 pack over the weekend and two 24 ounces of beer a day during the week along with a pint of hard alcohol     Family History:     Family History   Problem Relation Name Age of Onset    Leukemia Mother      Cancer Mother      Heart attack Father          massive MI at age 67    No Known Problems Sister      No Known Problems Brother      No Known Problems Sister      No Known Problems Sister      Heart failure Paternal Grandmother      Diabetes Paternal Aunt      Hypertension Paternal Aunt      Leukemia Paternal Aunt          CLL    Suicide Paternal Uncle      Anesthesia problems Paternal Uncle      Diabetes Paternal Aunt      Stroke Paternal Aunt      Valvular heart disease Maternal Aunt      Kidney disease Neg Hx      Colon cancer Neg Hx       Physical Exam:   BP (!) 143/68   Pulse 81   Wt 96.6 kg (212 lb 15.4 oz)   SpO2 97%   BMI 27.34 kg/m²        Physical Exam    General: No acute distress. Well-developed. Well-nourished.  Eyes: EOMI. Sclerae anicteric.  HENT: Normocephalic. Atraumatic. Nares patent. Moist oral mucosa.  Cardiovascular: Regular rate. Regular rhythm. No murmurs.  No rubs. No gallops. Normal S1, S2.  Respiratory: Normal respiratory effort. Clear to auscultation bilaterally. No rales. No rhonchi. No wheezing.  Musculoskeletal: No  obvious deformity.  Extremities: No lower extremity edema.  Neurological: Alert & oriented x3. No slurred speech. Normal gait.  Psychiatric: Normal mood. Normal affect. Good insight. Good judgment.  Skin: Warm. Dry. No rash.           Labs:     Lab Results   Component Value Date     05/08/2025     02/18/2025    K 4.8 05/08/2025    K 4.1 02/18/2025     05/08/2025    CL 97 02/18/2025    CO2 27 05/08/2025    CO2 27 02/18/2025    BUN 50 (H) 05/08/2025    CREATININE 12.4 (H) 05/08/2025    ANIONGAP 14 05/08/2025     Lab Results   Component Value Date    HGBA1C 4.9 03/12/2024     Lab Results   Component Value Date     (H) 07/01/2021    BNP 1,178 (H) 12/23/2020     (H) 07/03/2020    Lab Results   Component Value Date    WBC 5.81 05/08/2025    HGB 13.1 (L) 05/08/2025    HGB 10.7 (L) 02/18/2025    HCT 39.8 (L) 05/08/2025    HCT 33.1 (L) 02/18/2025    HCT 25 (L) 02/08/2017     (L) 05/08/2025     02/18/2025    GRAN 5.4 02/18/2025    GRAN 73.9 (H) 02/18/2025     Lab Results   Component Value Date    CHOL 248 (H) 03/12/2024    HDL 29 (L) 03/12/2024    LDLCALC 170.0 (H) 03/12/2024    TRIG 245 (H) 03/12/2024          Lipids:  Recent Labs   Lab 03/12/24  1006   LDL Cholesterol 170.0 H   HDL 29 L      Renal:  Recent Labs   Lab 05/08/25  1301   Creatinine 12.4 H   Potassium 4.8   CO2 27   BUN 50 H     Liver:  Recent Labs   Lab 05/08/25  1301   AST 17   ALT 24           Assessment & Plan:     Assessment & Plan    I27.20 Pulmonary HTN  I05.0 Severe mitral valve stenosis  Z95.2 History of mechanical aortic valve replacement  I34.2 Nonrheumatic mitral valve stenosis  N18.6, Z99.2 ESRD (end stage renal disease) on dialysis  I10 Essential hypertension    PLAN SUMMARY:   Consider ablation for a-fib management   Refer for ablation  procedure in [place]   Consider elective mitral valve replacement as alternative option   Continue Warfarin for mechanical aortic valve    SEVERE MITRAL VALVE STENOSIS:   Reviewed echocardiogram with Dr. Rahman.   Discussed potential balloon valvuloplasty for mitral valve stenosis, as suggested by Dr. Gastelum, describing procedure, its typical use in scarred valves, and potential risks in calcified valves including severe mitral regurgitation.   Considered elective mitral valve replacement as alternative option.   Severe mitral stenosis is not compatible with long-term survival, necessitating intervention despite surgical risks.    HISTORY OF MECHANICAL AORTIC VALVE REPLACEMENT:   Continued Warfarin for mechanical aortic valve.    NONRHEUMATIC MITRAL VALVE STENOSIS:   Reviewed echocardiogram with Dr. Rahman.   Discussed balloon valvuloplasty for mitral valve stenosis in a nonrheumatic, calcified valve. The valve is not amendable to the balloon valvuloplasty due to potential for severe wide-open MR which is not compatible life.    Considered elective mitral valve replacement as alternative option.   Severe mitral stenosis is not compatible with long-term survival, necessitating intervention despite surgical risks.    LIFESTYLE CHANGES:   Considered ablation for a-fib management.   Referred for ablation procedure in [place], contact office to schedule appointment.   Follow up discussion with Dr. Gastelum, patient not candidate for BMV.        Signed:  Braxton Lenz MD, FACC, [facility], [facility]    This note was generated with the assistance of ambient listening technology. Verbal consent was obtained by the patient and accompanying visitor(s) for the recording of patient appointment to facilitate this note. I attest to having reviewed and edited the generated note for accuracy, though some syntax or spelling errors may persist. Please contact the author of this note for any clarification.            [1]    Current Outpatient Medications on File Prior to Visit   Medication Sig Dispense Refill    ascorbic acid, vitamin C, (VITAMIN C) 500 MG tablet Take 1 tablet (500 mg total) by mouth 2 (two) times daily.      B complex-vitamin C-folic acid (ETHAN-RACQUEL) 0.8 mg Tab Take 1 tablet by mouth once daily 30 tablet 6    B complex-vitamin C-folic acid (RENAL-RACQUEL) 0.8 mg Tab Take 1 tablet by mouth once daily 30 tablet 6    calcium acetate,phosphat bind, (PHOSLO) 667 mg capsule Take 3 capsules by mouth 3 times daily with meals and 1 capsule by mouth twice daily with snacks 330 capsule 11    coenzyme Q10 (COQ-10) 100 mg capsule Take 1 capsule (100 mg total) by mouth once daily. 90 capsule 1    diltiaZEM (CARDIZEM CD) 180 MG 24 hr capsule Take 3 capsules (540 mg total) by mouth once daily. 270 capsule 3    ferrous sulfate 324 mg (65 mg iron) TbEC Take 325 mg by mouth once daily.      gabapentin (NEURONTIN) 100 MG capsule Take 1 capsule by mouth every 8 hours 90 capsule 2    gabapentin (NEURONTIN) 300 MG capsule Take 1 capsule orally twice daily as needed for pain. 60 capsule 0    hydrALAZINE (APRESOLINE) 100 MG tablet Take 1 tablet (100 mg total) by mouth every 8 (eight) hours. 270 tablet 1    HYDROcodone-acetaminophen (NORCO) 7.5-325 mg per tablet as needed; Take 1 tablet by mouth every 12 hours 60 tablet 0    hydrOXYzine HCL (ATARAX) 25 MG tablet Take one tablet by mouth daily as needed for itching 90 tablet 3    multivitamin with minerals tablet Take 1 tablet by mouth once daily.      olmesartan (BENICAR) 40 MG tablet Take 1 tablet (40 mg total) by mouth every evening. 90 tablet 3    omega-3 fatty acids-vitamin E (FISH OIL) 1,000 mg Cap Take 1 capsule by mouth once daily.  0    pantoprazole (PROTONIX) 40 MG tablet Take 1 tablet by mouth once daily 90 tablet 3    pantoprazole (PROTONIX) 40 MG tablet Take 1 tablet by mouth once daily 30 tablet 3    rosuvastatin (CRESTOR) 10 MG tablet Take 1 tablet (10 mg total) by mouth once  daily. 90 tablet 0    tenofovir disoproxil fumarate (VIREAD) 300 mg Tab TAKE 1 TABLET BY MOUTH ONCE WEEKLY 4 tablet 3    vitamin D (VITAMIN D3) 1000 units Tab Take 1 tablet by mouth daily 30 tablet 6    warfarin (COUMADIN) 5 MG tablet Take 1 tablet (5 mg) by mouth on Sunday, Tuesday and Thursday.  Using the 7.5 mg tablet, take 1 tablet (7.5 mg ) by mouth on Monday, Wednesday, Friday and Saturday or as directed by coumadin clinic. 12 tablet 11    warfarin (COUMADIN) 7.5 MG tablet Take one tablet (7.5 mg) by mouth on Monday, Wednesday, Friday and Saturday.  Using the 5 mg tablets, take 1 tablet by mouth (5 mg) on Sunday, Tuesday and Thursday OR as directed by coumadin clinic. 20 tablet 11     Current Facility-Administered Medications on File Prior to Visit   Medication Dose Route Frequency Provider Last Rate Last Admin    0.9%  NaCl infusion   Intravenous Continuous Christopher Jane MD        sodium chloride 0.9% flush 10 mL  10 mL Intravenous PRN Christopher Jane MD

## 2025-05-21 ENCOUNTER — PATIENT MESSAGE (OUTPATIENT)
Dept: CARDIOLOGY | Facility: CLINIC | Age: 55
End: 2025-05-21
Payer: MEDICARE

## 2025-05-22 ENCOUNTER — TELEPHONE (OUTPATIENT)
Dept: CARDIOLOGY | Facility: CLINIC | Age: 55
End: 2025-05-22
Payer: MEDICARE

## 2025-05-22 NOTE — TELEPHONE ENCOUNTER
Pt is confused on why he needs to see arrhythmia. Referral I have received troy chow states he needs a device placed in for chf. Pt is confused and unsure about the whole referral and states that [place] did not explain things to him. He will not set up the appt with arrhythmia until he speaks with you please advise pbr

## 2025-05-27 ENCOUNTER — ANTI-COAG VISIT (OUTPATIENT)
Dept: CARDIOLOGY | Facility: CLINIC | Age: 55
End: 2025-05-27
Payer: MEDICARE

## 2025-05-27 DIAGNOSIS — I48.91 ON COUMADIN FOR ATRIAL FIBRILLATION: ICD-10-CM

## 2025-05-27 DIAGNOSIS — Z79.01 LONG TERM (CURRENT) USE OF ANTICOAGULANTS: Primary | ICD-10-CM

## 2025-05-27 DIAGNOSIS — Z95.2 HISTORY OF MECHANICAL AORTIC VALVE REPLACEMENT: ICD-10-CM

## 2025-05-27 DIAGNOSIS — Z79.01 ON COUMADIN FOR ATRIAL FIBRILLATION: ICD-10-CM

## 2025-05-27 DIAGNOSIS — I48.0 PAROXYSMAL ATRIAL FIBRILLATION: ICD-10-CM

## 2025-05-27 LAB
CTP QC/QA: YES
INR PPP: 3.2 (ref 2–3)

## 2025-05-27 PROCEDURE — 85610 PROTHROMBIN TIME: CPT | Mod: PBBFAC

## 2025-05-27 PROCEDURE — 99999PBSHW POCT INR: Mod: PBBFAC,,,

## 2025-05-27 NOTE — PROGRESS NOTES
INR not at goal. Medications, chart, and patient findings reviewed. See calendar for adjustments to dose and follow up plan.  Bleeding has stopped.  Pt should STOP cranberries.  He generally runs low to borderline low, will not adjust dose at this time.  Repeat INR in 2 weeks.

## 2025-05-27 NOTE — PROGRESS NOTES
Patient's INR is supra-therapeutic at 3.2. Patient confirms he followed previous instructions. Patient reports he has been eating cranberries. Re-educated patient on Coumadin Diet and need to discontinue any cranberry products. Advised patient of increased risk of bleeding; signs/symptoms of clotting and need to go to ED if he experiences any. Patient reports he had excess bleeding from dialysis graft on 5/26/25 and hd to get re-bandaged twice at dialysis. Sent to [facility] for dosing/instructions.

## 2025-05-28 ENCOUNTER — OFFICE VISIT (OUTPATIENT)
Dept: CARDIOLOGY | Facility: CLINIC | Age: 55
End: 2025-05-28
Payer: MEDICARE

## 2025-05-28 VITALS
SYSTOLIC BLOOD PRESSURE: 130 MMHG | HEIGHT: 74 IN | OXYGEN SATURATION: 95 % | BODY MASS INDEX: 26.66 KG/M2 | WEIGHT: 207.69 LBS | HEART RATE: 140 BPM | DIASTOLIC BLOOD PRESSURE: 80 MMHG

## 2025-05-28 DIAGNOSIS — Z95.2 H/O AORTIC VALVE REPLACEMENT: ICD-10-CM

## 2025-05-28 DIAGNOSIS — I34.2 NONRHEUMATIC MITRAL VALVE STENOSIS: Primary | ICD-10-CM

## 2025-05-28 DIAGNOSIS — I10 ESSENTIAL HYPERTENSION: ICD-10-CM

## 2025-05-28 DIAGNOSIS — I05.0 SEVERE MITRAL VALVE STENOSIS: ICD-10-CM

## 2025-05-28 DIAGNOSIS — Z95.2 H/O MECHANICAL AORTIC VALVE REPLACEMENT: ICD-10-CM

## 2025-05-28 DIAGNOSIS — N18.6 END-STAGE RENAL DISEASE: ICD-10-CM

## 2025-05-28 DIAGNOSIS — B18.1 CHRONIC VIRAL HEPATITIS B WITHOUT DELTA AGENT AND WITHOUT COMA: ICD-10-CM

## 2025-05-28 DIAGNOSIS — Z99.2 ESRD (END STAGE RENAL DISEASE) ON DIALYSIS: ICD-10-CM

## 2025-05-28 DIAGNOSIS — I47.10 SVT (SUPRAVENTRICULAR TACHYCARDIA): ICD-10-CM

## 2025-05-28 DIAGNOSIS — I50.32 CHRONIC DIASTOLIC HEART FAILURE: ICD-10-CM

## 2025-05-28 DIAGNOSIS — N18.6 ESRD (END STAGE RENAL DISEASE) ON DIALYSIS: ICD-10-CM

## 2025-05-28 DIAGNOSIS — I47.29 NSVT (NONSUSTAINED VENTRICULAR TACHYCARDIA): ICD-10-CM

## 2025-05-28 DIAGNOSIS — I48.0 PAROXYSMAL ATRIAL FIBRILLATION: ICD-10-CM

## 2025-05-28 DIAGNOSIS — I27.20 PULMONARY HYPERTENSION: ICD-10-CM

## 2025-05-28 DIAGNOSIS — I70.0 AORTIC ATHEROSCLEROSIS: ICD-10-CM

## 2025-05-28 DIAGNOSIS — Z98.890 HISTORY OF RADIOFREQUENCY ABLATION FOR COMPLEX RIGHT ATRIAL ARRHYTHMIA: ICD-10-CM

## 2025-05-28 DIAGNOSIS — I48.0 PAF (PAROXYSMAL ATRIAL FIBRILLATION): ICD-10-CM

## 2025-05-28 DIAGNOSIS — I47.19 ATRIAL TACHYCARDIA: ICD-10-CM

## 2025-05-28 DIAGNOSIS — E78.49 OTHER HYPERLIPIDEMIA: ICD-10-CM

## 2025-05-28 DIAGNOSIS — N52.9 ERECTILE DYSFUNCTION, UNSPECIFIED ERECTILE DYSFUNCTION TYPE: ICD-10-CM

## 2025-05-28 DIAGNOSIS — Z95.2 S/P AVR (AORTIC VALVE REPLACEMENT): ICD-10-CM

## 2025-05-28 PROCEDURE — 99214 OFFICE O/P EST MOD 30 MIN: CPT | Mod: PBBFAC | Performed by: INTERNAL MEDICINE

## 2025-05-28 PROCEDURE — 99999 PR PBB SHADOW E&M-EST. PATIENT-LVL IV: CPT | Mod: PBBFAC,,, | Performed by: INTERNAL MEDICINE

## 2025-05-28 RX ORDER — DILTIAZEM HYDROCHLORIDE 180 MG/1
540 CAPSULE, COATED, EXTENDED RELEASE ORAL DAILY
Qty: 270 CAPSULE | Refills: 1 | Status: SHIPPED | OUTPATIENT
Start: 2025-05-28 | End: 2026-05-28

## 2025-05-28 RX ORDER — TENOFOVIR DISOPROXIL FUMARATE 300 MG/1
300 TABLET, FILM COATED ORAL WEEKLY
Qty: 4 TABLET | Refills: 3 | Status: CANCELLED | OUTPATIENT
Start: 2025-05-28

## 2025-05-28 NOTE — Clinical Note
Hi pt has been to [facility] for MVR eval but his more urgent issue is the tachycardia /SVT if you can see him and schedule an ablation soon. Thanks

## 2025-05-28 NOTE — PROGRESS NOTES
Subjective:   Patient ID:  Isidro White Jr. is a 55 y.o. male who presents for cardiac consult of No chief complaint on file.          The patient came in today for cardiac consult of No chief complaint on file.      Isidro White Jr. is a 55 y.o. male pt with PAF, ASD closure at age 8 ([facility]), HFpEF, s/p AVR with a 22 mm mechanical valve and TV annuloplasty with a 28 mm ring 3/17, HTN, PHTN, Mitral stenosis, SVT, EP performed RFA (3/13/17) and has been on HD - MWF since Feb 8,2016 here for CV follow up.     3/18/25  BP low normal - has been off Labetolol  Has been taking extra dilt - will increase to 540 mg  He has more fatigue lately along with tachycardia      5/28/25  Per Dr. Gastelum  This would be his 3rd sternotomy. He has end stage renal disease and is chronically on dialysis. Lastly, he is known to have cirrhosis and although his labs look okay by imaging he has a nodular liver. He is followed by hepatology in [place]. We will make arrangements for him to see Dr. Guallpa who had previously done an ablation for him to address his recurrent SVT.   Needs to see EP for possible Afib ablation? - per Dr. Lenz  Referred for ablation procedure in [place], contact office to schedule appointment.   Follow up discussion with Dr. Gastelum, patient not candidate for BMV.    BP stable today. HR 140s. BMI 26  ECHO 5/2025 normal bi V function, severe LAE, mech AVR MG 18 mmHg, severe MS, mild MR, mild TR, mild PI, PASP 60 mmHg.   Pt wants to go ahead with ablation with Dr. De León.   Vital monitor 4/2025 neg. Anemia has improved.     Results for orders placed during the hospital encounter of 05/20/25    Echo    Interpretation Summary    Left Ventricle: The left ventricle is normal in size. Normal wall thickness. There is concentric remodeling. Normal wall motion. There is low normal systolic function with a visually estimated ejection fraction of 50 - 55%. Diastolic function cannot be reliably  determined in the presence of mitral annular calcification.    Right Ventricle: The right ventricle is moderately dilated Wall thickness is normal. Systolic function is normal.    Left Atrium: The left atrium is severely dilated    Right Atrium: The right atrium is moderately dilated .    Aortic Valve: There is a mechanical valve in the aortic position. Aortic valve area by VTI is 1.4 cm2. Aortic valve peak velocity is 3.0 m/s. Mean gradient is 18 mmHg. The dimensionless index is 0.45.    Mitral Valve: There is bileaflet sclerosis. Mildly calcification at the base of the leaflets. There is severe mitral annular calcification. Moderately calcified subvalvular apparatus. Moderately restricted motion. There is moderate-severe stenosis. The mean pressure gradient across the mitral valve is 14 mmHg at a heart rate of 90 bpm. There is mild regurgitation. MVA by pressure half time is 2.5 cm2 but this is not validated in the setting of severe MAC.    Tricuspid Valve: The tricuspid valve is repaired by annular ring. There is mild regurgitation.    Pulmonic Valve: There is mild regurgitation.    Pulmonary Artery: There is pulmonary hypertension. The estimated pulmonary artery systolic pressure is 60 mmHg.    IVC/SVC: Intermediate venous pressure at 8 mmHg.      Results for orders placed during the hospital encounter of 05/02/24    Echo    Interpretation Summary    Left Ventricle: The left ventricle is normal in size. Normal wall thickness. There is concentric remodeling. Normal wall motion. Ejection fraction by visual approximation is 60%. Grade II diastolic dysfunction.    Right Ventricle: Normal right ventricular cavity size. Wall thickness is normal. Systolic function is normal.    Left Atrium: Left atrium is severely dilated.    Aortic Valve: There is a tilting disc prosthetic valve in the aortic position.    Mitral Valve: Moderately calcified leaflets. Moderately restricted motion. There is mild to moderate stenosis. MVA  2.6 cm2 by planimetry, 1.6 cm2 by PHT.  The mean pressure gradient across the mitral valve is 11 mmHg at a heart rate of  bpm. There is mild regurgitation.    Tricuspid Valve: There is mild to moderate regurgitation.    Pulmonary Artery: There is pulmonary hypertension. The estimated pulmonary artery systolic pressure is 70 mmHg.    IVC/SVC: Normal venous pressure at 3 mmHg.      Results for orders placed during the hospital encounter of 04/27/23    Echo    Interpretation Summary  · The left ventricle is normal in size with concentric remodeling and low normal systolic function.  · Mild left atrial enlargement.  · Grade I left ventricular diastolic dysfunction.  · The estimated PA systolic pressure is 47 mmHg.  · Normal right ventricular size with normal right ventricular systolic function.  · There is pulmonary hypertension.  · Normal central venous pressure (3 mmHg).  · There is a bileaflet tilting disc mechanical aortic valve present. Prosthetic aortic valve is normal.  · The aortic valve mean gradient is 19 mmHg with a dimensionless index of 0.47.  · The estimated ejection fraction is 50%.  · The mean diastolic gradient across the mitral valve is 11 mmHg at a heart rate of 76 bpm.  · There is mild to moderate mitral stenosis.  · Mild to moderate tricuspid regurgitation.      Results for orders placed during the hospital encounter of 07/07/22    Echo    Interpretation Summary  · The left ventricle is normal in size with moderate concentric hypertrophy and normal systolic function.  · Mild left atrial enlargement.  · The estimated ejection fraction is 60%.  · Grade II left ventricular diastolic dysfunction.  · There is abnormal septal wall motion consistent with post-operative status.  · Normal right ventricular size with normal right ventricular systolic function.  · Mild right atrial enlargement.  · There is a bileaflet tilting disc mechanical aortic valve present.  · The aortic valve mean gradient is 15 mmHg  with a dimensionless index of 0.52.  · The mean diastolic gradient across the mitral valve is 11 mmHg at a heart rate of bpm.  · There is mild to moderate mitral stenosis.  · There is moderate pulmonary hypertension.  · Mild tricuspid regurgitation.  · Intermediate central venous pressure (8 mmHg).  · The estimated PA systolic pressure is 54 mmHg.  · The aortic root is mildly dilated.      RHC  Summary       The estimated blood loss was <50 mL.  Believe PA pressures are elevated in setting of increased filling pressures. Mean PA pressure is right around 34.  Recommend stricter volume management, review of echoes for the last 3 year demonstrates similar findings.  If this is a barrier to renal transplant, can consider adding PDE5, however do not think he will be able to tolerate given increased filling pressure and patient stating he drops pressures with HD.  Will likely need to discuss with abdominal transplant team.     The procedure log was documented by Documenter: Isis Sevilla and verified by Mariah Pierce MD.      Results for orders placed during the hospital encounter of 05/25/21    Nuclear Stress - Cardiology Interpreted    Interpretation Summary    Normal myocardial perfusion scan. There is no evidence of myocardial ischemia or infarction.    The gated perfusion images showed an ejection fraction of 65% at rest. The gated perfusion images showed an ejection fraction of 47% post stress.    The EKG portion of this study is negative for ischemia.    · Intermediate central venous pressure (8 mmHg).       Cath 2/17  1. Coronary angiography.      a.     Left main widely patent.      b.     LAD widely patent.      c.     Ramus widely patent and massive in size.      d.     Circumflex widely patent with a small OM 1 and OM 2 branch.      e.     Right coronary widely patent.  2. Hemodynamics:  Right heart catheterization.      a.     Pulmonary capillary wedge pressure 33 at end-expiration.      b.     Pulmonary  artery pressure 78/42.      c.     Right ventricular pressure 70/21.      d.     Right atrial pressure 22 with a peak V-wave of 24.      e.     Cardiac output by thermodilution is between 6 and 7       L/minute, by Patricia 6.1 L/minute.    CONCLUSION  1. Normal coronary arteries.  2. Pulmonary hypertension.  3. Severe aortic insufficiency.      Past Medical History:   Diagnosis Date    Anemia     Aortic valve stenosis     s/p mechanical AVR    Atrial fibrillation     Atrial flutter     Cardiomyopathy     CHF (congestive heart failure)     Drug-induced erectile dysfunction     ESRD due to hypertension     HD M, W, F    ESRD on dialysis     GERD (gastroesophageal reflux disease)     Hepatitis B     Hyperlipidemia     Hypertension     Nightmares     Obesity     DANIEL (obstructive sleep apnea) 11/12/2019    Secondary hyperparathyroidism of renal origin     Supraventricular tachycardia     atrial tachycardia    Tachycardia     Valvular regurgitation     AI, TR       Past Surgical History:   Procedure Laterality Date    ASD REPAIR      age 8 [facility]    AV Graft Creation Left 03/2017    CARDIAC [facility]ATION      [facility]     CARDIAC VALVE SURGERY  02/08/2017    22 mm Medtronic AV, 28 mm TV Medtronic annuloplaty ring    COLONOSCOPY N/A 8/27/2019    Procedure: COLONOSCOPY;  Surgeon: Addie John MD;  Location: [facility] ENDO;  Service: Endoscopy;  Laterality: N/A;  dialysis pt *needs K*    COLONOSCOPY N/A 9/3/2020    Procedure: COLONOSCOPY-need INR;  Surgeon: Jemma Youngblood MD;  Location: [facility] ENDO;  Service: Endoscopy;  Laterality: N/A;    ESOPHAGOGASTRODUODENOSCOPY N/A 8/27/2019    Procedure: EGD (ESOPHAGOGASTRODUODENOSCOPY);  Surgeon: Addie John MD;  Location: [facility] ENDO;  Service: Endoscopy;  Laterality: N/A;    INSERTION OF INFLATABLE PENILE PROSTHESIS N/A 5/21/2024    Procedure: INSERTION, PENILE PROSTHESIS, INFLATABLE;  Surgeon: Bonifacio Shirley MD;  Location: [facility] OR;  Service: Urology;  Laterality:  N/A;    RADIOFREQUENCY ABLATION  03/13/2017    Atrial tachycardia    REMOVAL OF GRAFT Left 7/2/2020    Procedure: REMOVAL, GRAFT;  Surgeon: Sascha Sidhu MD;  Location[facility] OR;  Service: Peripheral Vascular;  Laterality: Left;    RIGHT HEART CATHETERIZATION Right 8/12/2021    Procedure: INSERTION, CATHETER, RIGHT HEART;  Surgeon: Mariah Pierce MD;  Location: [facility] CATH LAB;  Service: Cardiology;  Laterality: Right;       Social History     Tobacco Use    Smoking status: Never     Passive exposure: Never    Smokeless tobacco: Never   Substance Use Topics    Alcohol use: Yes     Alcohol/week: 14.0 standard drinks of alcohol     Types: 14 Glasses of wine per week     Comment: Drinks 1-2 glasses of red wine - quit in 2016; does have heavy drinking history; started drinking at age 13; was drinking a 12 pack over the weekend and two 24 ounces of beer a day during the week along with a pint of hard alcohol    Drug use: No       Family History   Problem Relation Name Age of Onset    Leukemia Mother      Cancer Mother      Heart attack Father          massive MI at age 67    No Known Problems Sister      No Known Problems Brother      No Known Problems Sister      No Known Problems Sister      Heart failure Paternal Grandmother      Diabetes Paternal Aunt      Hypertension Paternal Aunt      Leukemia Paternal Aunt          CLL    Suicide Paternal Uncle      Anesthesia problems Paternal Uncle      Diabetes Paternal Aunt      Stroke Paternal Aunt      Valvular heart disease Maternal Aunt      Kidney disease Neg Hx      Colon cancer Neg Hx             Review of Systems   Constitutional:  Positive for malaise/fatigue.   HENT: Negative.     Eyes: Negative.    Respiratory:  Negative for shortness of breath.    Cardiovascular:  Negative for chest pain and palpitations.   Gastrointestinal: Negative.    Genitourinary:  Negative for hematuria.   Musculoskeletal:  Positive for joint pain.   Skin: Negative.    Neurological:  "Negative.    Endo/Heme/Allergies: Negative.    Psychiatric/Behavioral: Negative.     All 12 systems otherwise negative.      Wt Readings from Last 3 Encounters:   05/28/25 94.2 kg (207 lb 10.8 oz)   05/20/25 95.3 kg (210 lb)   05/20/25 96.6 kg (212 lb 15.4 oz)     Temp Readings from Last 3 Encounters:   05/15/25 98 °F (36.7 °C) (Temporal)   05/03/25 97.8 °F (36.6 °C) (Oral)   10/07/24 98 °F (36.7 °C) (Temporal)     BP Readings from Last 3 Encounters:   05/28/25 130/80   05/20/25 (!) 140/72   05/20/25 (!) 143/68     Pulse Readings from Last 3 Encounters:   05/28/25 (!) 140   05/20/25 78   05/20/25 81       /80 (BP Location: Left arm, Patient Position: Sitting)   Pulse (!) 140   Ht 6' 2" (1.88 m)   Wt 94.2 kg (207 lb 10.8 oz)   SpO2 95%   BMI 26.66 kg/m²      Objective:   Physical Exam  Vitals and nursing note reviewed.   Constitutional:       General: He is not in acute distress.     Appearance: He is well-developed. He is not diaphoretic.   HENT:      Head: Normocephalic and atraumatic.      Nose: Nose normal.   Eyes:      General: No scleral icterus.     Conjunctiva/sclera: Conjunctivae normal.   Neck:      Thyroid: No thyromegaly.      Vascular: No JVD.   Cardiovascular:      Rate and Rhythm: Regular rhythm. Tachycardia present.      Heart sounds: S1 normal and S2 normal. Murmur heard.      Midsystolic murmur is present at the upper right sternal border.      No friction rub. No gallop. No S3 or S4 sounds.      Comments: Crisp S2  Pulmonary:      Effort: Pulmonary effort is normal. No respiratory distress.      Breath sounds: Normal breath sounds. No stridor. No wheezing or rales.   Chest:      Chest wall: No tenderness.   Abdominal:      General: Bowel sounds are normal. There is no distension.      Palpations: Abdomen is soft. There is no mass.      Tenderness: There is no abdominal tenderness. There is no rebound.   Genitourinary:     Comments: Deferred  Musculoskeletal:         General: No " tenderness or deformity. Normal range of motion.      Cervical back: Normal range of motion and neck supple.   Lymphadenopathy:      Cervical: No cervical adenopathy.   Skin:     General: Skin is warm and dry.      Coloration: Skin is not pale.      Findings: No erythema or rash.   Neurological:      Mental Status: He is alert and oriented to person, place, and time.      Motor: No abnormal muscle tone.      Coordination: Coordination normal.   Psychiatric:         Behavior: Behavior normal.         Thought Content: Thought content normal.         Judgment: Judgment normal.         Lab Results   Component Value Date     05/08/2025     02/18/2025    K 4.8 05/08/2025    K 4.1 02/18/2025     05/08/2025    CL 97 02/18/2025    CO2 27 05/08/2025    CO2 27 02/18/2025    BUN 50 (H) 05/08/2025    CREATININE 12.4 (H) 05/08/2025    GLU 87 05/08/2025    GLU 91 02/18/2025    HGBA1C 4.9 03/12/2024    MG 2.3 08/12/2021    AST 17 05/08/2025    AST 14 02/18/2025    ALT 24 05/08/2025    ALT 10 02/18/2025    ALBUMIN 3.5 05/08/2025    ALBUMIN 2.9 (L) 02/18/2025    PROT 8.8 (H) 05/08/2025    PROT 7.7 05/08/2025    PROT 8.2 02/18/2025    BILITOT 0.5 05/08/2025    BILITOT 0.6 02/18/2025    WBC 5.81 05/08/2025    HGB 13.1 (L) 05/08/2025    HGB 10.7 (L) 02/18/2025    HCT 39.8 (L) 05/08/2025    HCT 33.1 (L) 02/18/2025    HCT 25 (L) 02/08/2017     (H) 05/08/2025     (H) 02/18/2025     (L) 05/08/2025     02/18/2025    INR 3.2 (A) 05/27/2025    INR 2.5 (H) 02/18/2025    TSH 1.587 05/08/2025    TSH 2.953 09/24/2024    CHOL 248 (H) 03/12/2024    HDL 29 (L) 03/12/2024    LDLCALC 170.0 (H) 03/12/2024    TRIG 245 (H) 03/12/2024     (H) 07/01/2021     Assessment:      1. Nonrheumatic mitral valve stenosis    2. Paroxysmal atrial fibrillation    3. Severe mitral valve stenosis    4. ESRD (end stage renal disease) on dialysis Q M,W,F    5. Essential hypertension    6. SVT (supraventricular  tachycardia)    7. PAF (paroxysmal atrial fibrillation)    8. Chronic diastolic heart failure    9. Aortic atherosclerosis    10. H/O mechanical aortic valve replacement    11. Pulmonary hypertension    12. Atrial tachycardia    13. S/P AVR (aortic valve replacement)    14. History of radiofrequency ablation for complex right atrial arrhythmia    15. H/O aortic valve replacement    16. Other hyperlipidemia    17. End-stage renal disease    18. Erectile dysfunction, unspecified erectile dysfunction type          Plan:   1. Chronic Diastolic HF  - cont low salt diet  - cont HD for volume removal    2. S/p mech AVR, with mild to moderate MS, mild to mod TR, pulm HTN   - cont coumadin  - f/u with coumadin clinic here  - f/u with CTS - in [facility] Dr. Gatselum  - ECHO in July 2022 with normal bi V function, stable valves AV and MV, mild to mod MS, PASP 54 mmHg  -ECHO 4/2023 with normal bi V function, grade 1 DD, mech AV with mean 19mmHg, mild to mod MS - 11 mmHg. Mild to mod TR, PASP 47 mmHg.   -ECHO 5/2024 with normal bi V function, grade 2 DD, AV valve stable - mechanical. Mild to mod MS - MG 11 mmHg, mild MR, mild to mod TR. Pulm HTN - PASP 70 mmHg.   -ECHO 5/2025 normal bi V function, severe LAE, mech AVR MG 18 mmHg, severe MS, mild MR, mild TR, mild PI, PASP 60 mmHg.     3. ESRD - dry weight improved  - cont HD  - anemia with PRBCs and EPO - f/u hemeonc  - anemia improved     4. HTN -  - cont meds, Dilt 420 mg  --> Has been taking extra dilt - will increase to 540 mg  - adjusted BP meds - labetelol TID - do not take in AM before HD  - stop labetolol  - restart Hydral and Benicar 40     5. PVCs, SVT s/p RFA , Afib - in NSR now   - cont BB and CCB   - recurrence of his old arrhythmia or could be a new source (AFL, R atriotomy, perimitral).  - discussed ablation.   Has been taking extra dilt - will increase to 540 mg  - 7 day vital  - AVG HR 89 BPM, needs to discuss ablation with Dr. De León     6. HLD  - cont statin, needs  repeat labs     7. ED  - cont tx PRN  - f/u urology for penile implant surgery   - monitor BP, avoid nitrates     8. Pulm HTN/ DANIEL   -f/u CHF/pulm HTN clinic - may start PDE5  - f/u with Dr. Pierce    9. Chronic hep B   - cont tx per hepatology  - further workup pending for cirrhosis - EGD      Visit today included increased complexity associated with the care of the episodic problem SVT addressed and managing the longitudinal care of the patient due to the serious and/or complex managed problem(s) .      Thank you for allowing me to participate in this patient's care. Please do not hesitate to contact me with any questions or concerns. Consult note has been forwarded to the referral physician.     Fuad Gudino MD, [facility]  Cardiovascular Disease  [facility], [place]  [phone] (P)

## 2025-05-29 DIAGNOSIS — B18.1 CHRONIC VIRAL HEPATITIS B WITHOUT DELTA AGENT AND WITHOUT COMA: Primary | ICD-10-CM

## 2025-05-30 RX ORDER — TENOFOVIR DISOPROXIL FUMARATE 300 MG/1
300 TABLET, FILM COATED ORAL WEEKLY
Qty: 4 TABLET | Refills: 11 | Status: ACTIVE | OUTPATIENT
Start: 2025-05-30 | End: 2026-05-30

## 2025-06-03 ENCOUNTER — LAB VISIT (OUTPATIENT)
Dept: LAB | Facility: HOSPITAL | Age: 55
End: 2025-06-03
Attending: INTERNAL MEDICINE
Payer: MEDICARE

## 2025-06-03 ENCOUNTER — PATIENT MESSAGE (OUTPATIENT)
Dept: CARDIOLOGY | Facility: CLINIC | Age: 55
End: 2025-06-03
Payer: MEDICARE

## 2025-06-03 ENCOUNTER — RESULTS FOLLOW-UP (OUTPATIENT)
Dept: HEMATOLOGY/ONCOLOGY | Facility: CLINIC | Age: 55
End: 2025-06-03

## 2025-06-03 DIAGNOSIS — N18.9 ANEMIA ASSOCIATED WITH CHRONIC RENAL FAILURE: ICD-10-CM

## 2025-06-03 DIAGNOSIS — D63.1 ANEMIA ASSOCIATED WITH CHRONIC RENAL FAILURE: ICD-10-CM

## 2025-06-03 LAB
ABSOLUTE EOSINOPHIL (OHS): 0.28 K/UL
ABSOLUTE MONOCYTE (OHS): 0.63 K/UL (ref 0.3–1)
ABSOLUTE NEUTROPHIL COUNT (OHS): 3.79 K/UL (ref 1.8–7.7)
ALBUMIN SERPL BCP-MCNC: 3.6 G/DL (ref 3.5–5.2)
ALP SERPL-CCNC: 84 UNIT/L (ref 40–150)
ALT SERPL W/O P-5'-P-CCNC: 20 UNIT/L (ref 10–44)
ANION GAP (OHS): 16 MMOL/L (ref 8–16)
AST SERPL-CCNC: 16 UNIT/L (ref 11–45)
BASOPHILS # BLD AUTO: 0.02 K/UL
BASOPHILS NFR BLD AUTO: 0.3 %
BILIRUB SERPL-MCNC: 0.5 MG/DL (ref 0.1–1)
BUN SERPL-MCNC: 47 MG/DL (ref 6–20)
CALCIUM SERPL-MCNC: 10.2 MG/DL (ref 8.7–10.5)
CHLORIDE SERPL-SCNC: 97 MMOL/L (ref 95–110)
CO2 SERPL-SCNC: 25 MMOL/L (ref 23–29)
CREAT SERPL-MCNC: 12.8 MG/DL (ref 0.5–1.4)
ERYTHROCYTE [DISTWIDTH] IN BLOOD BY AUTOMATED COUNT: 14 % (ref 11.5–14.5)
FERRITIN SERPL-MCNC: 2285 NG/ML (ref 20–300)
GFR SERPLBLD CREATININE-BSD FMLA CKD-EPI: 4 ML/MIN/1.73/M2
GLUCOSE SERPL-MCNC: 116 MG/DL (ref 70–110)
HCT VFR BLD AUTO: 42.9 % (ref 40–54)
HGB BLD-MCNC: 14.1 GM/DL (ref 14–18)
IMM GRANULOCYTES # BLD AUTO: 0.01 K/UL (ref 0–0.04)
IMM GRANULOCYTES NFR BLD AUTO: 0.2 % (ref 0–0.5)
IRON SATN MFR SERPL: 26 % (ref 20–50)
IRON SERPL-MCNC: 63 UG/DL (ref 45–160)
LYMPHOCYTES # BLD AUTO: 1.38 K/UL (ref 1–4.8)
MCH RBC QN AUTO: 35 PG (ref 27–31)
MCHC RBC AUTO-ENTMCNC: 32.9 G/DL (ref 32–36)
MCV RBC AUTO: 107 FL (ref 82–98)
NUCLEATED RBC (/100WBC) (OHS): 0 /100 WBC
PLATELET # BLD AUTO: 150 K/UL (ref 150–450)
PMV BLD AUTO: 9.8 FL (ref 9.2–12.9)
POTASSIUM SERPL-SCNC: 5.1 MMOL/L (ref 3.5–5.1)
PROT SERPL-MCNC: 9.2 GM/DL (ref 6–8.4)
RBC # BLD AUTO: 4.03 M/UL (ref 4.6–6.2)
RELATIVE EOSINOPHIL (OHS): 4.6 %
RELATIVE LYMPHOCYTE (OHS): 22.6 % (ref 18–48)
RELATIVE MONOCYTE (OHS): 10.3 % (ref 4–15)
RELATIVE NEUTROPHIL (OHS): 62 % (ref 38–73)
SODIUM SERPL-SCNC: 138 MMOL/L (ref 136–145)
TIBC SERPL-MCNC: 238 UG/DL (ref 250–450)
TRANSFERRIN SERPL-MCNC: 161 MG/DL (ref 200–375)
WBC # BLD AUTO: 6.11 K/UL (ref 3.9–12.7)

## 2025-06-03 PROCEDURE — 82040 ASSAY OF SERUM ALBUMIN: CPT

## 2025-06-03 PROCEDURE — 85025 COMPLETE CBC W/AUTO DIFF WBC: CPT

## 2025-06-03 PROCEDURE — 84466 ASSAY OF TRANSFERRIN: CPT

## 2025-06-03 PROCEDURE — 36415 COLL VENOUS BLD VENIPUNCTURE: CPT

## 2025-06-03 PROCEDURE — 82728 ASSAY OF FERRITIN: CPT

## 2025-06-04 ENCOUNTER — OFFICE VISIT (OUTPATIENT)
Dept: CARDIOLOGY | Facility: CLINIC | Age: 55
End: 2025-06-04
Payer: MEDICARE

## 2025-06-04 VITALS
OXYGEN SATURATION: 96 % | SYSTOLIC BLOOD PRESSURE: 122 MMHG | WEIGHT: 205 LBS | DIASTOLIC BLOOD PRESSURE: 80 MMHG | HEART RATE: 79 BPM | BODY MASS INDEX: 26.31 KG/M2 | HEIGHT: 74 IN

## 2025-06-04 DIAGNOSIS — I47.10 SVT (SUPRAVENTRICULAR TACHYCARDIA): ICD-10-CM

## 2025-06-04 DIAGNOSIS — I47.19 ATRIAL TACHYCARDIA: Primary | ICD-10-CM

## 2025-06-04 PROCEDURE — 99214 OFFICE O/P EST MOD 30 MIN: CPT | Mod: PBBFAC | Performed by: INTERNAL MEDICINE

## 2025-06-04 PROCEDURE — 99999 PR PBB SHADOW E&M-EST. PATIENT-LVL IV: CPT | Mod: PBBFAC,,, | Performed by: INTERNAL MEDICINE

## 2025-06-04 RX ORDER — AMIODARONE HYDROCHLORIDE 200 MG/1
200 TABLET ORAL 2 TIMES DAILY
Qty: 180 TABLET | Refills: 3 | Status: SHIPPED | OUTPATIENT
Start: 2025-06-04

## 2025-06-05 ENCOUNTER — OFFICE VISIT (OUTPATIENT)
Dept: HEMATOLOGY/ONCOLOGY | Facility: CLINIC | Age: 55
End: 2025-06-05
Payer: MEDICARE

## 2025-06-05 VITALS
SYSTOLIC BLOOD PRESSURE: 122 MMHG | BODY MASS INDEX: 26.54 KG/M2 | HEIGHT: 74 IN | WEIGHT: 206.81 LBS | RESPIRATION RATE: 20 BRPM | HEART RATE: 87 BPM | TEMPERATURE: 98 F | DIASTOLIC BLOOD PRESSURE: 72 MMHG | OXYGEN SATURATION: 98 %

## 2025-06-05 DIAGNOSIS — N18.6 ESRD (END STAGE RENAL DISEASE) ON DIALYSIS: Chronic | ICD-10-CM

## 2025-06-05 DIAGNOSIS — D63.1 ANEMIA ASSOCIATED WITH CHRONIC RENAL FAILURE: Primary | ICD-10-CM

## 2025-06-05 DIAGNOSIS — I48.91 ON COUMADIN FOR ATRIAL FIBRILLATION: ICD-10-CM

## 2025-06-05 DIAGNOSIS — D69.6 THROMBOCYTOPENIA: ICD-10-CM

## 2025-06-05 DIAGNOSIS — N18.9 ANEMIA ASSOCIATED WITH CHRONIC RENAL FAILURE: Primary | ICD-10-CM

## 2025-06-05 DIAGNOSIS — Z79.01 ON COUMADIN FOR ATRIAL FIBRILLATION: ICD-10-CM

## 2025-06-05 DIAGNOSIS — Z99.2 ESRD (END STAGE RENAL DISEASE) ON DIALYSIS: Chronic | ICD-10-CM

## 2025-06-05 PROCEDURE — 99999 PR PBB SHADOW E&M-EST. PATIENT-LVL IV: CPT | Mod: PBBFAC,,, | Performed by: INTERNAL MEDICINE

## 2025-06-05 PROCEDURE — 99214 OFFICE O/P EST MOD 30 MIN: CPT | Mod: PBBFAC | Performed by: INTERNAL MEDICINE

## 2025-06-09 DIAGNOSIS — I48.0 PAROXYSMAL ATRIAL FIBRILLATION: ICD-10-CM

## 2025-06-09 DIAGNOSIS — I47.19 ATRIAL TACHYCARDIA: Primary | ICD-10-CM

## 2025-06-10 ENCOUNTER — ANTI-COAG VISIT (OUTPATIENT)
Dept: CARDIOLOGY | Facility: CLINIC | Age: 55
End: 2025-06-10
Payer: MEDICARE

## 2025-06-10 DIAGNOSIS — I48.0 PAROXYSMAL ATRIAL FIBRILLATION: ICD-10-CM

## 2025-06-10 DIAGNOSIS — Z79.01 ON COUMADIN FOR ATRIAL FIBRILLATION: ICD-10-CM

## 2025-06-10 DIAGNOSIS — I48.91 ATRIAL FIBRILLATION, UNSPECIFIED TYPE: ICD-10-CM

## 2025-06-10 DIAGNOSIS — I48.91 ON COUMADIN FOR ATRIAL FIBRILLATION: ICD-10-CM

## 2025-06-10 DIAGNOSIS — Z95.2 S/P AVR (AORTIC VALVE REPLACEMENT): ICD-10-CM

## 2025-06-10 DIAGNOSIS — Z79.01 LONG TERM (CURRENT) USE OF ANTICOAGULANTS: Primary | ICD-10-CM

## 2025-06-10 DIAGNOSIS — Z95.2 HISTORY OF MECHANICAL AORTIC VALVE REPLACEMENT: ICD-10-CM

## 2025-06-10 LAB
CTP QC/QA: YES
INR PPP: 1.6 (ref 2–3)

## 2025-06-10 PROCEDURE — 99999PBSHW POCT INR: Mod: PBBFAC,,,

## 2025-06-10 PROCEDURE — 85610 PROTHROMBIN TIME: CPT | Mod: PBBFAC

## 2025-06-10 NOTE — PROGRESS NOTES
Patient's INR is sub-therapeutic at 1.6. Patient confirms he followed previous dosing/instructions. Patient reports he has been eating chocolate covered nuts.  Re-educated patient on Coumadin Diet and need to keep consistent. Advised patient of increased risk of clotting; signs/symptoms of clotting and need to go to ED if he experiences any. Patient report he is not  having any signs/symptoms of clotting. Patient has started taking amiodarone 200 mg twice a day on 6/4/25. Sent to PharmD for dosing/instructions.

## 2025-06-10 NOTE — PROGRESS NOTES
Amio 200 mg BID start  - pt will likely need a dose decrease in his weekly warfarin.  We will give a small boost today, but he will need weekly follow up due to DDI.  Needs to be in range with ablation pending.   There is also a DDI with tenofovir and amiodarone - pt advised on SE.  Should report to MD if side effects increase.

## 2025-06-13 ENCOUNTER — TELEPHONE (OUTPATIENT)
Dept: ELECTROPHYSIOLOGY | Facility: CLINIC | Age: 55
End: 2025-06-13
Payer: MEDICARE

## 2025-06-17 ENCOUNTER — TELEPHONE (OUTPATIENT)
Dept: CARDIOLOGY | Facility: CLINIC | Age: 55
End: 2025-06-17
Payer: MEDICARE

## 2025-06-17 NOTE — TELEPHONE ENCOUNTER
"Spoke with Antonio at [facility] lab in reference to patient's INR order being "disassociated'. Antonio states  stuck patient once and was not successful. Patient refused to be stuck again and left lab without specimen being collected.     "

## 2025-06-17 NOTE — TELEPHONE ENCOUNTER
Patient states he refused to be stuck more than once and left lab therefore PT/INR and no other labs were collected. Patient has rescheduled to come into Coumadin Clinic at [facility] on 6/18/25 for INR.

## 2025-06-18 ENCOUNTER — ANTI-COAG VISIT (OUTPATIENT)
Dept: CARDIOLOGY | Facility: CLINIC | Age: 55
End: 2025-06-18
Payer: MEDICARE

## 2025-06-18 DIAGNOSIS — Z79.01 LONG TERM (CURRENT) USE OF ANTICOAGULANTS: Primary | ICD-10-CM

## 2025-06-18 DIAGNOSIS — I48.91 ON COUMADIN FOR ATRIAL FIBRILLATION: ICD-10-CM

## 2025-06-18 DIAGNOSIS — I48.0 PAROXYSMAL ATRIAL FIBRILLATION: ICD-10-CM

## 2025-06-18 DIAGNOSIS — Z79.01 ON COUMADIN FOR ATRIAL FIBRILLATION: ICD-10-CM

## 2025-06-18 DIAGNOSIS — Z95.2 HISTORY OF MECHANICAL AORTIC VALVE REPLACEMENT: ICD-10-CM

## 2025-06-18 LAB
CTP QC/QA: YES
INR PPP: 1.6 (ref 2–3)

## 2025-06-18 PROCEDURE — 99999PBSHW POCT INR: Mod: PBBFAC,,,

## 2025-06-18 PROCEDURE — 85610 PROTHROMBIN TIME: CPT | Mod: PBBFAC

## 2025-06-18 NOTE — PROGRESS NOTES
INR not at goal. Medications, chart, and patient findings reviewed. See calendar for adjustments to dose and follow up plan.  Pt reports bleeding for port- - we will not boost today, reduce greens in diet.  He is Afib + AVR - do not want to hold dose either.  Repeat INR in 1 week.  ER with anymore uncontrolled bleeding, SOB, DZ or LH.

## 2025-06-18 NOTE — PROGRESS NOTES
Patient's INR is sub-therapeutic at 1.6. Patient confirms he followed previous dosing/instructions. Patient reports he  ate more dark leafy greens than usual.  Re-educated patient on Coumadin Diet and need to keep consistent. Advised patient of increased risk of clotting; signs/symptoms of clotting and need to go to ED if he experiences any. Patient report he is not  having any signs/symptoms of clotting. Patient reports he had excessive bleeding from hemodialysis port today . Sent to PharmD for dosing/instructions.

## 2025-06-26 ENCOUNTER — ANTI-COAG VISIT (OUTPATIENT)
Dept: CARDIOLOGY | Facility: CLINIC | Age: 55
End: 2025-06-26
Payer: MEDICARE

## 2025-06-26 DIAGNOSIS — I48.91 ON COUMADIN FOR ATRIAL FIBRILLATION: ICD-10-CM

## 2025-06-26 DIAGNOSIS — Z79.01 ON COUMADIN FOR ATRIAL FIBRILLATION: ICD-10-CM

## 2025-06-26 DIAGNOSIS — Z79.01 LONG TERM (CURRENT) USE OF ANTICOAGULANTS: Primary | ICD-10-CM

## 2025-06-26 DIAGNOSIS — I48.0 PAROXYSMAL ATRIAL FIBRILLATION: ICD-10-CM

## 2025-06-26 DIAGNOSIS — Z95.2 HISTORY OF MECHANICAL AORTIC VALVE REPLACEMENT: ICD-10-CM

## 2025-06-26 LAB
CTP QC/QA: YES
INR PPP: 1.7 (ref 2–3)

## 2025-06-26 PROCEDURE — 85610 PROTHROMBIN TIME: CPT | Mod: PBBFAC

## 2025-06-26 PROCEDURE — 99999PBSHW POCT INR: Mod: PBBFAC,,,

## 2025-06-26 NOTE — PROGRESS NOTES
INR not at goal. Medications, chart, and patient findings reviewed. See calendar for adjustments to dose and follow up plan.  INR is slightly improved, but pt continues to miss doses.  Boost more aggressively now that bleeding has stopped.  Repeat INR in 1.5 weeks.  ER with any additional bleeding or s/s of stroke.

## 2025-06-26 NOTE — PROGRESS NOTES
INR remains subtherapeutic - 1.7.  Patient reports bleeding from hemodialysis port has been resolved.  Patient reports dose was missed last night.  No other changes or s/s reported.  Will send to PharmD for review and dosing.

## 2025-07-08 ENCOUNTER — ANTI-COAG VISIT (OUTPATIENT)
Dept: CARDIOLOGY | Facility: CLINIC | Age: 55
End: 2025-07-08
Payer: MEDICARE

## 2025-07-08 DIAGNOSIS — Z95.2 HISTORY OF MECHANICAL AORTIC VALVE REPLACEMENT: ICD-10-CM

## 2025-07-08 DIAGNOSIS — I48.0 PAROXYSMAL ATRIAL FIBRILLATION: ICD-10-CM

## 2025-07-08 DIAGNOSIS — I48.91 ON COUMADIN FOR ATRIAL FIBRILLATION: ICD-10-CM

## 2025-07-08 DIAGNOSIS — Z79.01 LONG TERM (CURRENT) USE OF ANTICOAGULANTS: Primary | ICD-10-CM

## 2025-07-08 DIAGNOSIS — Z79.01 ON COUMADIN FOR ATRIAL FIBRILLATION: ICD-10-CM

## 2025-07-08 LAB
CTP QC/QA: YES
INR PPP: 3 (ref 2–3)

## 2025-07-08 PROCEDURE — 85610 PROTHROMBIN TIME: CPT | Mod: PBBFAC

## 2025-07-08 PROCEDURE — 99999PBSHW POCT INR: Mod: PBBFAC,,,

## 2025-07-08 NOTE — PROGRESS NOTES
INR is therapeutic at 3.0.  Previous warfarin instructions were verified and followed.  No other changes reported.  Continue warfarin 5 mg every Sun, Tues, Thurs; and 7.5 mg all other days.  Advised to keep diet consistent.  INR recheck in 2 weeks.  Dose calendar given - confirms understanding.

## 2025-07-08 NOTE — PROGRESS NOTES
Chart reviewed, agree with LPN plan.  Amiodarone should have affected INR at this point, since he remains in range, no change to dose currently.  Agree with repeat timeline.

## 2025-07-22 ENCOUNTER — ANTI-COAG VISIT (OUTPATIENT)
Dept: CARDIOLOGY | Facility: CLINIC | Age: 55
End: 2025-07-22
Payer: MEDICARE

## 2025-07-22 DIAGNOSIS — Z79.01 LONG TERM (CURRENT) USE OF ANTICOAGULANTS: Primary | ICD-10-CM

## 2025-07-22 DIAGNOSIS — I48.91 ON COUMADIN FOR ATRIAL FIBRILLATION: ICD-10-CM

## 2025-07-22 DIAGNOSIS — Z79.01 ON COUMADIN FOR ATRIAL FIBRILLATION: ICD-10-CM

## 2025-07-22 DIAGNOSIS — Z95.2 HISTORY OF MECHANICAL AORTIC VALVE REPLACEMENT: ICD-10-CM

## 2025-07-22 DIAGNOSIS — I48.0 PAROXYSMAL ATRIAL FIBRILLATION: ICD-10-CM

## 2025-07-22 LAB
CTP QC/QA: YES
INR PPP: 2.6 (ref 2–3)

## 2025-07-22 PROCEDURE — 85610 PROTHROMBIN TIME: CPT | Mod: PBBFAC

## 2025-07-22 PROCEDURE — 99999PBSHW POCT INR: Mod: PBBFAC,,,

## 2025-07-22 NOTE — PROGRESS NOTES
INR is therapeutic at 2.6.  Patient reports no recent changes.  Confirms current warfarin dose - continue 5 mg every Sun, Tues, Thurs; and 7.5 mg all other days.  INR recheck in 3 weeks - 8/12/2025.  Patient voices understanding.

## 2025-07-23 RX ORDER — ROSUVASTATIN CALCIUM 10 MG/1
10 TABLET, COATED ORAL DAILY
Qty: 90 TABLET | Refills: 0 | OUTPATIENT
Start: 2025-07-23 | End: 2025-10-21

## 2025-07-23 NOTE — TELEPHONE ENCOUNTER
Care Due:                  Date            Visit Type   Department     Provider  --------------------------------------------------------------------------------                                [facility] INTERNAL  Last Visit: 07-      FOLLOW UP    MEDICINE       [place] Nev[facility]  Next Visit: None Scheduled  None         None Found                                                            Last  Test          Frequency    Reason                     Performed    Due Date  --------------------------------------------------------------------------------    Office Visit  15 months..  rosuvastatin.............  07- 09-    Lipid Panel.  12 months..  rosuvastatin.............  03- 03-    [employer] Embedded Care Due Messages. Reference number: [id number].   7/23/2025 3:50:51 AM CDT

## 2025-08-05 RX ORDER — ROSUVASTATIN CALCIUM 10 MG/1
10 TABLET, COATED ORAL DAILY
Qty: 90 TABLET | Refills: 0 | OUTPATIENT
Start: 2025-08-05 | End: 2025-11-03

## 2025-08-05 NOTE — TELEPHONE ENCOUNTER
No care due was identified.  [facility] Embedded Care Due Messages. Reference number: [id number].   8/05/2025 9:08:48 AM CDT

## 2025-08-05 NOTE — TELEPHONE ENCOUNTER
Copied from CRM #[id number]. Topic: Medications - Medication Status Check   >> Aug 5, 2025  9:20 AM Aishwarya wrote:  Type:  Patient Requesting a call back     Who Called:Isidro   What is the call back request regarding?:pt states that his Pharmacy is requesting a recommended MD in order to fill his medication and is waiting on a response   Would the patient rather a call back or a response via [facility]?call  Best Call Back Number:[phone]    Additional Information:     [facility]  [address]  [place] [address]  Phone: [phone] Fax: [phone]

## 2025-08-12 ENCOUNTER — ANTI-COAG VISIT (OUTPATIENT)
Dept: CARDIOLOGY | Facility: CLINIC | Age: 55
End: 2025-08-12
Payer: MEDICARE

## 2025-08-12 DIAGNOSIS — Z79.01 LONG TERM (CURRENT) USE OF ANTICOAGULANTS: Primary | ICD-10-CM

## 2025-08-12 DIAGNOSIS — I48.0 PAROXYSMAL ATRIAL FIBRILLATION: ICD-10-CM

## 2025-08-12 DIAGNOSIS — I48.91 ON COUMADIN FOR ATRIAL FIBRILLATION: ICD-10-CM

## 2025-08-12 DIAGNOSIS — Z79.01 ON COUMADIN FOR ATRIAL FIBRILLATION: ICD-10-CM

## 2025-08-12 DIAGNOSIS — Z95.2 HISTORY OF MECHANICAL AORTIC VALVE REPLACEMENT: ICD-10-CM

## 2025-08-12 LAB
CTP QC/QA: YES
INR PPP: 1.6 (ref 2–3)

## 2025-08-12 PROCEDURE — 85610 PROTHROMBIN TIME: CPT | Mod: PBBFAC

## 2025-08-12 PROCEDURE — 99999PBSHW POCT INR: Mod: PBBFAC,,,

## 2025-08-28 DIAGNOSIS — D63.1 ANEMIA ASSOCIATED WITH CHRONIC RENAL FAILURE: Primary | ICD-10-CM

## 2025-08-28 DIAGNOSIS — N18.9 ANEMIA ASSOCIATED WITH CHRONIC RENAL FAILURE: Primary | ICD-10-CM

## 2025-09-03 ENCOUNTER — PATIENT OUTREACH (OUTPATIENT)
Dept: ADMINISTRATIVE | Facility: HOSPITAL | Age: 55
End: 2025-09-03
Payer: MEDICARE

## 2025-09-03 ENCOUNTER — TELEPHONE (OUTPATIENT)
Dept: CARDIOLOGY | Facility: CLINIC | Age: 55
End: 2025-09-03
Payer: MEDICARE

## 2025-09-04 ENCOUNTER — TELEPHONE (OUTPATIENT)
Dept: CARDIOLOGY | Facility: CLINIC | Age: 55
End: 2025-09-04
Payer: MEDICARE

## (undated) DEVICE — SPONGE GAUZE 16PLY 4X4

## (undated) DEVICE — PACK BASIC SETUP SC BR

## (undated) DEVICE — SUT 2-0 12-18IN SILK

## (undated) DEVICE — KIT PROBE COVER WITH GEL

## (undated) DEVICE — CANNULA VESSEL

## (undated) DEVICE — Device

## (undated) DEVICE — SOL NS 1000CC

## (undated) DEVICE — DRAPE SPLIT STERILE

## (undated) DEVICE — MANIFOLD 4 PORT

## (undated) DEVICE — BNDG COFLEX FOAM LF2 ST 4X5YD

## (undated) DEVICE — BOWL STERILE LARGE 32OZ

## (undated) DEVICE — ELECTRODE PAD DEFIB STERILE

## (undated) DEVICE — ADHESIVE MASTISOL VIAL 48/BX

## (undated) DEVICE — SYR 50ML CATH TIP

## (undated) DEVICE — STAPLER SKIN PROXIMATE REG

## (undated) DEVICE — GEL AQUASONIC 100 STERILE20GM

## (undated) DEVICE — CLOSURE SKIN STERI STRIP 1/2X4

## (undated) DEVICE — SOL NACL 0.9% INJ 500ML BG

## (undated) DEVICE — SUT PROLENE 4-0 RB-1 BL MO

## (undated) DEVICE — RETRACTOR DEEP SCROTAL INF LF

## (undated) DEVICE — APPLICATOR CHLORAPREP ORN 26ML

## (undated) DEVICE — COVER LIGHT HANDLE 80/CA

## (undated) DEVICE — DRESSING TELFA PAD N ADH 2X3

## (undated) DEVICE — BOOT SUTURE AID

## (undated) DEVICE — SYS IRRISEPT 450ML0.05% CHG

## (undated) DEVICE — SUT PROLENE RB-1 4-0

## (undated) DEVICE — CANISTER SUCTION JUMBO 12L

## (undated) DEVICE — COVER OVERHEAD SURG LT BLUE

## (undated) DEVICE — TRAY CATH 1-LYR URIMTR 16FR

## (undated) DEVICE — EVACUATOR WOUND BULB 100CC

## (undated) DEVICE — DRAIN CHANNEL ROUND 19FR

## (undated) DEVICE — SUT 2/0 30IN SILK BLK BRAI

## (undated) DEVICE — SUT 4-0 12-18IN SILK BLACK

## (undated) DEVICE — SYR 50CC LL

## (undated) DEVICE — BLADE STERN 65.8MM

## (undated) DEVICE — SUT SILK BLK BR. 2 2-60

## (undated) DEVICE — SEE MEDLINE ITEM 152487

## (undated) DEVICE — SUT 6 18IN STEEL MONO CCS

## (undated) DEVICE — LOOP VESSEL BLUE MAXI

## (undated) DEVICE — SUT PROLENE 6-0 BV-1

## (undated) DEVICE — GAUZE SPONGE 4'X4 12 PLY

## (undated) DEVICE — SEE MEDLINE ITEM 157173

## (undated) DEVICE — DRESSING AQUACEL SACRAL 9 X 9

## (undated) DEVICE — HEMOSTAT SURGICEL 4X8IN

## (undated) DEVICE — WIRE INTRAMYOCARDIAL TEMP

## (undated) DEVICE — SYR 30CC LUER LOCK

## (undated) DEVICE — TRAY CATH UM FOLEY SIL W 16FR

## (undated) DEVICE — CATH ON-Q EXPANSION 5

## (undated) DEVICE — SYR ONLY LUER LOCK 20CC

## (undated) DEVICE — DRAPE PLASTIC U 60X72

## (undated) DEVICE — GLOVE SURGICAL LATEX SZ 8

## (undated) DEVICE — SEE MEDLINE ITEM 146308

## (undated) DEVICE — KIT SAHARA DRAPE DRAW/LIFT

## (undated) DEVICE — SUT ETHIBOND EXCEL 2-0 SH-2

## (undated) DEVICE — DRAPE LAP T SHT W/ INSTR PAD

## (undated) DEVICE — SEE MEDLINE ITEM 157206

## (undated) DEVICE — RETRACTOR OCTOBASE INSERT HOLD

## (undated) DEVICE — YANKAUER FLEX NO VENT REG CAP

## (undated) DEVICE — SUT VICRYL 2-0 27 CT-1

## (undated) DEVICE — NDL HYPO A BEVEL 30X1/2

## (undated) DEVICE — SOL 9P NACL IRR PIC IL

## (undated) DEVICE — SUT PROLENE 5-0 BL C-1 4-24

## (undated) DEVICE — SUT 3-0 CTD VICRYL 27IN PS

## (undated) DEVICE — SEE MEDLINE ITEM 157027

## (undated) DEVICE — SUT SILK 3-0 STRANDS 30IN

## (undated) DEVICE — SUT 3-0 12-18IN SILK

## (undated) DEVICE — COVER PROXIMA MAYO STAND

## (undated) DEVICE — DRESSING TRANS 4X4 TEGADERM

## (undated) DEVICE — SYR SLIP TIP 1CC

## (undated) DEVICE — TRAY MINOR GEN SURG

## (undated) DEVICE — SUT SILK 2-0 STRANDS 30IN

## (undated) DEVICE — CONTAINER SPECIMEN STRL 4OZ

## (undated) DEVICE — PROBE CATH TEMP 16 FRFOLEY 400

## (undated) DEVICE — SEE MEDLINE ITEM 157117

## (undated) DEVICE — ELECTRODE REM PLYHSV RETURN 9

## (undated) DEVICE — BLADE SURG CARBON STEEL SZ11

## (undated) DEVICE — CLIP SPRING 6MM

## (undated) DEVICE — SUT 2/0 30IN ETHIBOND

## (undated) DEVICE — SEE MEDLINE ITEM 152622

## (undated) DEVICE — SUT VICRYL 3-0 27 SH

## (undated) DEVICE — SWABSTICK BENZOIN 4 IN

## (undated) DEVICE — GOWN POLY REINF X-LONG XL

## (undated) DEVICE — SUT VICRYL BR 1 GEN 27 CT-1

## (undated) DEVICE — TAPE SURG MEDIPORE 6X72IN

## (undated) DEVICE — GAUZE SPONGE 4X4 12PLY

## (undated) DEVICE — SPONGE COTTON TRAY 4X4IN

## (undated) DEVICE — BLADE SURG #15 CARBON STEEL

## (undated) DEVICE — SUT VICRYL PLUS 3-0 SH 18IN

## (undated) DEVICE — SET DECANTER MEDICHOICE

## (undated) DEVICE — DRAPE SLUSH WARMER WITH DISC

## (undated) DEVICE — SYR LUER LOCK STERILE 10ML

## (undated) DEVICE — POUCH INSTRUMENT 2 POCKET

## (undated) DEVICE — SET MICROPUNCTURE 5FR 501NT

## (undated) DEVICE — CATH SWAN GANZ STND 7FR

## (undated) DEVICE — DRESSING MEPILEX BORDR AG 4X12

## (undated) DEVICE — SEE MEDLINE ITEM 157148

## (undated) DEVICE — SUT MCRYL PLUS 4-0 PS2 27IN

## (undated) DEVICE — SUT CHROMIC 3-0 PS2 27IN BR

## (undated) DEVICE — SEE MEDLINE ITEM 156894

## (undated) DEVICE — SUT LIGACLIP SMALL XTRA

## (undated) DEVICE — SEE MEDLINE ITEM 152739

## (undated) DEVICE — DRAPE INCISE IOBAN 2 23X17IN

## (undated) DEVICE — COVER SET UP STRL 54X54 20/BX

## (undated) DEVICE — DRAIN CHEST DRY SUCTION

## (undated) DEVICE — SUT SILK 2-0 SH 18IN BLACK

## (undated) DEVICE — FOGERTY SOFT JAW DISP 2/PK

## (undated) DEVICE — SUT 2/0 36IN COATED VICRYL

## (undated) DEVICE — SEE MEDLINE ITEM 146417

## (undated) DEVICE — DRESSING TELFA STRL 4X3 LF

## (undated) DEVICE — CLIP MED TICALL

## (undated) DEVICE — COVER PROBE NL STRL 3.6X96IN

## (undated) DEVICE — DRESSING ADH ISLAND 3.6 X 14

## (undated) DEVICE — SPONGE DERMACEA GAUZE 4X4

## (undated) DEVICE — BLADE SAW STERNAL 5/BX

## (undated) DEVICE — SUT VICRYL 2-0 CT-1 VCP345H

## (undated) DEVICE — SUT PROLENE 4-0 SH BLU 36IN

## (undated) DEVICE — SEE MEDLINE ITEM 154981

## (undated) DEVICE — INSERTS STEALTH FIBRA SIZE 5

## (undated) DEVICE — SHEATH INTRODUCER 7FR 11CM

## (undated) DEVICE — TRAY HEART

## (undated) DEVICE — PANTIES FEMININE NAPKIN LG/XLG

## (undated) DEVICE — SUT 2-0 VICRYL / SH (J417)

## (undated) DEVICE — SEE MEDLINE ITEM 153688